# Patient Record
Sex: MALE | Race: WHITE | Employment: FULL TIME | URBAN - METROPOLITAN AREA
[De-identification: names, ages, dates, MRNs, and addresses within clinical notes are randomized per-mention and may not be internally consistent; named-entity substitution may affect disease eponyms.]

---

## 2017-01-19 PROBLEM — F41.9 ANXIETY: Status: ACTIVE | Noted: 2017-01-19

## 2017-01-19 PROBLEM — K92.2 UGIB (UPPER GASTROINTESTINAL BLEED): Status: ACTIVE | Noted: 2017-01-19

## 2017-01-19 PROBLEM — D62 ACUTE BLOOD LOSS ANEMIA: Status: ACTIVE | Noted: 2017-01-19

## 2017-08-24 ENCOUNTER — APPOINTMENT (OUTPATIENT)
Dept: GENERAL RADIOLOGY | Facility: HOSPITAL | Age: 52
End: 2017-08-24
Attending: EMERGENCY MEDICINE
Payer: COMMERCIAL

## 2017-08-24 ENCOUNTER — HOSPITAL ENCOUNTER (OUTPATIENT)
Facility: HOSPITAL | Age: 52
Setting detail: OBSERVATION
Discharge: HOME | End: 2017-08-25
Attending: EMERGENCY MEDICINE | Admitting: INTERNAL MEDICINE
Payer: COMMERCIAL

## 2017-08-24 DIAGNOSIS — F10.920 ACUTE ALCOHOLIC INTOXICATION WITHOUT COMPLICATION (HCC): ICD-10-CM

## 2017-08-24 DIAGNOSIS — S42.352A DISPLACED COMMINUTED FRACTURE OF SHAFT OF HUMERUS, LEFT ARM, INITIAL ENCOUNTER FOR CLOSED FRACTURE: Primary | ICD-10-CM

## 2017-08-24 LAB
ANION GAP SERPL CALC-SCNC: 8 MMOL/L (ref 0–18)
BASOPHILS # BLD: 0 K/UL (ref 0–0.2)
BASOPHILS NFR BLD: 1 %
BUN SERPL-MCNC: 17 MG/DL (ref 8–20)
BUN/CREAT SERPL: 15.2 (ref 10–20)
CALCIUM SERPL-MCNC: 8.8 MG/DL (ref 8.5–10.5)
CHLORIDE SERPL-SCNC: 112 MMOL/L (ref 95–110)
CO2 SERPL-SCNC: 23 MMOL/L (ref 22–32)
CREAT SERPL-MCNC: 1.12 MG/DL (ref 0.5–1.5)
EOSINOPHIL # BLD: 0.1 K/UL (ref 0–0.7)
EOSINOPHIL NFR BLD: 1 %
ERYTHROCYTE [DISTWIDTH] IN BLOOD BY AUTOMATED COUNT: 13.9 % (ref 11–15)
GLUCOSE SERPL-MCNC: 114 MG/DL (ref 70–99)
HCT VFR BLD AUTO: 43.8 % (ref 41–52)
HGB BLD-MCNC: 14.7 G/DL (ref 13.5–17.5)
LYMPHOCYTES # BLD: 0.7 K/UL (ref 1–4)
LYMPHOCYTES NFR BLD: 9 %
MCH RBC QN AUTO: 33.2 PG (ref 27–32)
MCHC RBC AUTO-ENTMCNC: 33.7 G/DL (ref 32–37)
MCV RBC AUTO: 98.6 FL (ref 80–100)
MONOCYTES # BLD: 0.6 K/UL (ref 0–1)
MONOCYTES NFR BLD: 8 %
NEUTROPHILS # BLD AUTO: 6.2 K/UL (ref 1.8–7.7)
NEUTROPHILS NFR BLD: 81 %
OSMOLALITY UR CALC.SUM OF ELEC: 298 MOSM/KG (ref 275–295)
PLATELET # BLD AUTO: 109 K/UL (ref 140–400)
PMV BLD AUTO: 11.3 FL (ref 7.4–10.3)
POTASSIUM SERPL-SCNC: 3.4 MMOL/L (ref 3.3–5.1)
RBC # BLD AUTO: 4.44 M/UL (ref 4.5–5.9)
SODIUM SERPL-SCNC: 143 MMOL/L (ref 136–144)
WBC # BLD AUTO: 7.7 K/UL (ref 4–11)

## 2017-08-24 PROCEDURE — 99285 EMERGENCY DEPT VISIT HI MDM: CPT

## 2017-08-24 PROCEDURE — 96374 THER/PROPH/DIAG INJ IV PUSH: CPT

## 2017-08-24 PROCEDURE — 73060 X-RAY EXAM OF HUMERUS: CPT | Performed by: EMERGENCY MEDICINE

## 2017-08-24 PROCEDURE — 80048 BASIC METABOLIC PNL TOTAL CA: CPT | Performed by: EMERGENCY MEDICINE

## 2017-08-24 PROCEDURE — 85025 COMPLETE CBC W/AUTO DIFF WBC: CPT | Performed by: EMERGENCY MEDICINE

## 2017-08-24 RX ORDER — HYDROMORPHONE HYDROCHLORIDE 1 MG/ML
0.5 INJECTION, SOLUTION INTRAMUSCULAR; INTRAVENOUS; SUBCUTANEOUS ONCE
Status: COMPLETED | OUTPATIENT
Start: 2017-08-24 | End: 2017-08-24

## 2017-08-25 ENCOUNTER — APPOINTMENT (OUTPATIENT)
Dept: CT IMAGING | Facility: HOSPITAL | Age: 52
End: 2017-08-25
Attending: ORTHOPAEDIC SURGERY
Payer: COMMERCIAL

## 2017-08-25 VITALS
SYSTOLIC BLOOD PRESSURE: 140 MMHG | DIASTOLIC BLOOD PRESSURE: 93 MMHG | RESPIRATION RATE: 20 BRPM | WEIGHT: 243 LBS | TEMPERATURE: 98 F | HEART RATE: 86 BPM | BODY MASS INDEX: 35.99 KG/M2 | OXYGEN SATURATION: 95 % | HEIGHT: 69 IN

## 2017-08-25 PROBLEM — S42.352A DISPLACED COMMINUTED FRACTURE OF SHAFT OF HUMERUS, LEFT ARM, INITIAL ENCOUNTER FOR CLOSED FRACTURE: Status: RESOLVED | Noted: 2017-08-24 | Resolved: 2017-08-25

## 2017-08-25 PROBLEM — F10.920 ACUTE ALCOHOLIC INTOXICATION WITHOUT COMPLICATION (HCC): Status: ACTIVE | Noted: 2017-08-25

## 2017-08-25 PROBLEM — F10.920 ACUTE ALCOHOLIC INTOXICATION WITHOUT COMPLICATION (HCC): Status: RESOLVED | Noted: 2017-08-25 | Resolved: 2017-08-25

## 2017-08-25 LAB
ALBUMIN SERPL BCP-MCNC: 3.5 G/DL (ref 3.5–4.8)
ALBUMIN/GLOB SERPL: 1.3 {RATIO} (ref 1–2)
ALP SERPL-CCNC: 75 U/L (ref 32–100)
ALT SERPL-CCNC: 31 U/L (ref 17–63)
ANION GAP SERPL CALC-SCNC: 7 MMOL/L (ref 0–18)
APTT PPP: 25.4 SECONDS (ref 23.2–35.3)
AST SERPL-CCNC: 30 U/L (ref 15–41)
BILIRUB SERPL-MCNC: 0.5 MG/DL (ref 0.3–1.2)
BUN SERPL-MCNC: 14 MG/DL (ref 8–20)
BUN/CREAT SERPL: 12 (ref 10–20)
CALCIUM SERPL-MCNC: 8.8 MG/DL (ref 8.5–10.5)
CHLORIDE SERPL-SCNC: 113 MMOL/L (ref 95–110)
CHLORIDE SERPL-SCNC: 113 MMOL/L (ref 95–110)
CO2 SERPL-SCNC: 22 MMOL/L (ref 22–32)
CO2 SERPL-SCNC: 22 MMOL/L (ref 22–32)
CREAT SERPL-MCNC: 1.17 MG/DL (ref 0.5–1.5)
GLOBULIN PLAS-MCNC: 2.8 G/DL (ref 2.5–3.7)
GLUCOSE SERPL-MCNC: 124 MG/DL (ref 70–99)
INR BLD: 1 (ref 0.9–1.2)
MAGNESIUM SERPL-MCNC: 1.5 MG/DL (ref 1.8–2.5)
OSMOLALITY UR CALC.SUM OF ELEC: 296 MOSM/KG (ref 275–295)
POTASSIUM SERPL-SCNC: 3.6 MMOL/L (ref 3.3–5.1)
POTASSIUM SERPL-SCNC: 3.6 MMOL/L (ref 3.3–5.1)
POTASSIUM SERPL-SCNC: 3.7 MMOL/L (ref 3.3–5.1)
PROT SERPL-MCNC: 6.3 G/DL (ref 5.9–8.4)
PROTHROMBIN TIME: 12.9 SECONDS (ref 11.8–14.5)
SODIUM SERPL-SCNC: 142 MMOL/L (ref 136–144)
SODIUM SERPL-SCNC: 142 MMOL/L (ref 136–144)

## 2017-08-25 PROCEDURE — 84132 ASSAY OF SERUM POTASSIUM: CPT | Performed by: INTERNAL MEDICINE

## 2017-08-25 PROCEDURE — 73200 CT UPPER EXTREMITY W/O DYE: CPT | Performed by: ORTHOPAEDIC SURGERY

## 2017-08-25 PROCEDURE — 80053 COMPREHEN METABOLIC PANEL: CPT | Performed by: INTERNAL MEDICINE

## 2017-08-25 PROCEDURE — 76376 3D RENDER W/INTRP POSTPROCES: CPT | Performed by: ORTHOPAEDIC SURGERY

## 2017-08-25 PROCEDURE — 85730 THROMBOPLASTIN TIME PARTIAL: CPT | Performed by: INTERNAL MEDICINE

## 2017-08-25 PROCEDURE — 80307 DRUG TEST PRSMV CHEM ANLYZR: CPT | Performed by: INTERNAL MEDICINE

## 2017-08-25 PROCEDURE — 87081 CULTURE SCREEN ONLY: CPT | Performed by: INTERNAL MEDICINE

## 2017-08-25 PROCEDURE — 83735 ASSAY OF MAGNESIUM: CPT | Performed by: INTERNAL MEDICINE

## 2017-08-25 PROCEDURE — 85610 PROTHROMBIN TIME: CPT | Performed by: INTERNAL MEDICINE

## 2017-08-25 PROCEDURE — 80051 ELECTROLYTE PANEL: CPT | Performed by: INTERNAL MEDICINE

## 2017-08-25 RX ORDER — HYDROCODONE BITARTRATE AND ACETAMINOPHEN 10; 325 MG/1; MG/1
1 TABLET ORAL EVERY 4 HOURS PRN
Status: DISCONTINUED | OUTPATIENT
Start: 2017-08-25 | End: 2017-08-25

## 2017-08-25 RX ORDER — HYDROMORPHONE HYDROCHLORIDE 1 MG/ML
0.5 INJECTION, SOLUTION INTRAMUSCULAR; INTRAVENOUS; SUBCUTANEOUS EVERY 6 HOURS PRN
Status: DISCONTINUED | OUTPATIENT
Start: 2017-08-25 | End: 2017-08-25

## 2017-08-25 RX ORDER — HYDROCODONE BITARTRATE AND ACETAMINOPHEN 10; 325 MG/1; MG/1
2 TABLET ORAL EVERY 6 HOURS PRN
Status: DISCONTINUED | OUTPATIENT
Start: 2017-08-25 | End: 2017-08-25

## 2017-08-25 RX ORDER — DEXTROSE AND SODIUM CHLORIDE 5; .45 G/100ML; G/100ML
INJECTION, SOLUTION INTRAVENOUS CONTINUOUS
Status: DISCONTINUED | OUTPATIENT
Start: 2017-08-25 | End: 2017-08-25

## 2017-08-25 RX ORDER — HYDROMORPHONE HYDROCHLORIDE 1 MG/ML
0.5 INJECTION, SOLUTION INTRAMUSCULAR; INTRAVENOUS; SUBCUTANEOUS EVERY 2 HOUR PRN
Status: DISCONTINUED | OUTPATIENT
Start: 2017-08-25 | End: 2017-08-25

## 2017-08-25 RX ORDER — HYDROCODONE BITARTRATE AND ACETAMINOPHEN 10; 325 MG/1; MG/1
1 TABLET ORAL EVERY 4 HOURS PRN
Qty: 25 TABLET | Refills: 0 | Status: SHIPPED | OUTPATIENT
Start: 2017-08-25

## 2017-08-25 RX ORDER — LORAZEPAM 1 MG/1
1 TABLET ORAL
Status: DISCONTINUED | OUTPATIENT
Start: 2017-08-25 | End: 2017-08-25

## 2017-08-25 RX ORDER — MAGNESIUM OXIDE 400 MG (241.3 MG MAGNESIUM) TABLET
800 TABLET ONCE
Status: COMPLETED | OUTPATIENT
Start: 2017-08-25 | End: 2017-08-25

## 2017-08-25 RX ORDER — PANTOPRAZOLE SODIUM 40 MG/1
40 TABLET, DELAYED RELEASE ORAL
Status: DISCONTINUED | OUTPATIENT
Start: 2017-08-25 | End: 2017-08-25

## 2017-08-25 RX ORDER — LORAZEPAM 2 MG/1
2 TABLET ORAL
Status: DISCONTINUED | OUTPATIENT
Start: 2017-08-25 | End: 2017-08-25

## 2017-08-25 RX ORDER — LORAZEPAM 2 MG/ML
1 INJECTION INTRAMUSCULAR
Status: DISCONTINUED | OUTPATIENT
Start: 2017-08-25 | End: 2017-08-25

## 2017-08-25 RX ORDER — LORAZEPAM 2 MG/ML
2 INJECTION INTRAMUSCULAR
Status: DISCONTINUED | OUTPATIENT
Start: 2017-08-25 | End: 2017-08-25

## 2017-08-25 NOTE — H&P
[facility]    History and Physical    Damien Eis Patient Status:  Observation    1965 MRN [id number]   Location [facility] 4W/SW/SE Attending Barry Quiñonez MD   Hosp Day # 0 PCP No primary care provider on file. 140/93, pulse 86, temperature 97.9 °F (36.6 °C), temperature source Oral, resp. rate 20, height 5' 9\" (1.753 m), weight 243 lb (110.2 kg), SpO2 95 %. Nursing note and vitals reviewed. Constitutional: He appears well-developed.    HENT:   Head: Normoce

## 2017-08-25 NOTE — PLAN OF CARE
GASTROINTESTINAL - ADULT    • Maintains or returns to baseline bowel function Progressing        HEMATOLOGIC - ADULT    • Free from bleeding injury Progressing        MUSCULOSKELETAL - ADULT    • Return mobility to safest level of function Progressing    •

## 2017-08-25 NOTE — CONSULTS
[facility]    Report of Consultation    Violeta Wiggins [facility] Patient Status:  Observation    1965 MRN [id number]   Location [facility] 4W/SW/SE Attending Yolanda Plummer MD   Hosp Day # 0 PCP No primary care provider on grecia dextrose 5 %-0.45 % NaCl infusion, , Intravenous, Continuous  •  HYDROmorphone HCl PF (DILAUDID) 1 MG/ML injection 0.5 mg, 0.5 mg, Intravenous, Q6H PRN  •  potassium chloride 40 mEq in sodium chloride 0.9 % 250 mL IVPB, 40 mEq, Intravenous, Once    Review

## 2017-08-25 NOTE — ED INITIAL ASSESSMENT (HPI)
Pt fell from a standing position while walking in front of his residence 2 hours ago. Denies LOC or head injury. Pt admits to 2 to 4 vodka drinks tonight. Deformity to upper L arm. Arrived with sling to L arm.

## 2017-08-25 NOTE — ED PROVIDER NOTES
Patient Seen in: [facility] AND [facility] Emergency Department    History   Patient presents with:  Fall (musculoskeletal, neurologic)    Stated Complaint: fall, l arm deformity    HPI    The patient is a 59-year-old male who tripped on a curb just prior to arr Smoking status: Current Every Day Smoker                                                   Packs/day: 1.00      Years: 28.00     Alcohol use:  No                Review of Systems    Positive for stated complaint: fall, l arm deformity  Other systems are as Abdominal: Soft. Normal appearance and bowel sounds are normal. He exhibits no distension. There is no tenderness. There is no rigidity, no rebound, no guarding and no CVA tenderness. No echymosis   Musculoskeletal: Normal range of motion.         Left up RAINBOW DRAW LAVENDER TALL (BNP)       ============================================================  ED Course  ------------------------------------------------------------   A long-arm coaptation splint was applied to the left upper extremity.   After appl

## 2017-09-05 ENCOUNTER — APPOINTMENT (OUTPATIENT)
Dept: GENERAL RADIOLOGY | Facility: HOSPITAL | Age: 52
End: 2017-09-05
Attending: EMERGENCY MEDICINE
Payer: COMMERCIAL

## 2017-09-05 ENCOUNTER — HOSPITAL ENCOUNTER (EMERGENCY)
Facility: HOSPITAL | Age: 52
Discharge: NURSING, REHAB OR LONG-TERM CARE | End: 2017-09-05
Attending: EMERGENCY MEDICINE
Payer: COMMERCIAL

## 2017-09-05 VITALS
SYSTOLIC BLOOD PRESSURE: 122 MMHG | RESPIRATION RATE: 20 BRPM | WEIGHT: 240 LBS | DIASTOLIC BLOOD PRESSURE: 74 MMHG | TEMPERATURE: 98 F | HEART RATE: 108 BPM | BODY MASS INDEX: 35.55 KG/M2 | HEIGHT: 69 IN | OXYGEN SATURATION: 94 %
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
## 2017-09-05 DIAGNOSIS — F10.20 ALCOHOLISM (HCC): ICD-10-CM

## 2017-09-05 LAB
ALBUMIN SERPL BCP-MCNC: 3.7 G/DL (ref 3.5–4.8)
ALBUMIN/GLOB SERPL: 1 {RATIO} (ref 1–2)
ALP SERPL-CCNC: 100 U/L (ref 32–100)
ALT SERPL-CCNC: 56 U/L (ref 17–63)
ANION GAP SERPL CALC-SCNC: 17 MMOL/L (ref 0–18)
AST SERPL-CCNC: 59 U/L (ref 15–41)
BASOPHILS # BLD: 0 K/UL (ref 0–0.2)
BASOPHILS NFR BLD: 1 %
BENZODIAZ UR QL SCN: DETECTED
BILIRUB SERPL-MCNC: 0.7 MG/DL (ref 0.3–1.2)
BILIRUB UR QL: NEGATIVE
BUN SERPL-MCNC: 16 MG/DL (ref 8–20)
BUN/CREAT SERPL: 14.2 (ref 10–20)
CALCIUM SERPL-MCNC: 9.2 MG/DL (ref 8.5–10.5)
CHLORIDE SERPL-SCNC: 101 MMOL/L (ref 95–110)
CLARITY UR: CLEAR
CO2 SERPL-SCNC: 21 MMOL/L (ref 22–32)
COLOR UR: YELLOW
CREAT SERPL-MCNC: 1.13 MG/DL (ref 0.5–1.5)
EOSINOPHIL # BLD: 0.1 K/UL (ref 0–0.7)
EOSINOPHIL NFR BLD: 1 %
ERYTHROCYTE [DISTWIDTH] IN BLOOD BY AUTOMATED COUNT: 14.1 % (ref 11–15)
ETHANOL SERPL-MCNC: 237 MG/DL
GLOBULIN PLAS-MCNC: 3.7 G/DL (ref 2.5–3.7)
GLUCOSE SERPL-MCNC: 88 MG/DL (ref 70–99)
GLUCOSE UR-MCNC: NEGATIVE MG/DL
HCT VFR BLD AUTO: 46.9 % (ref 41–52)
HGB BLD-MCNC: 16 G/DL (ref 13.5–17.5)
HYALINE CASTS #/AREA URNS AUTO: 3 /LPF
KETONES UR-MCNC: NEGATIVE MG/DL
LEUKOCYTE ESTERASE UR QL STRIP.AUTO: NEGATIVE
LYMPHOCYTES # BLD: 1.4 K/UL (ref 1–4)
LYMPHOCYTES NFR BLD: 23 %
MCH RBC QN AUTO: 32.9 PG (ref 27–32)
MCHC RBC AUTO-ENTMCNC: 34.1 G/DL (ref 32–37)
MCV RBC AUTO: 96.5 FL (ref 80–100)
MONOCYTES # BLD: 0.5 K/UL (ref 0–1)
MONOCYTES NFR BLD: 9 %
NEUTROPHILS # BLD AUTO: 4.1 K/UL (ref 1.8–7.7)
NEUTROPHILS NFR BLD: 67 %
NITRITE UR QL STRIP.AUTO: NEGATIVE
OSMOLALITY UR CALC.SUM OF ELEC: 289 MOSM/KG (ref 275–295)
PH UR: 5 [PH] (ref 5–8)
PLATELET # BLD AUTO: 164 K/UL (ref 140–400)
PMV BLD AUTO: 9.2 FL (ref 7.4–10.3)
POTASSIUM SERPL-SCNC: 3.9 MMOL/L (ref 3.3–5.1)
PROT SERPL-MCNC: 7.4 G/DL (ref 5.9–8.4)
PROT UR-MCNC: 100 MG/DL
RBC # BLD AUTO: 4.86 M/UL (ref 4.5–5.9)
RBC #/AREA URNS AUTO: <1 /HPF
SODIUM SERPL-SCNC: 139 MMOL/L (ref 136–144)
SP GR UR STRIP: 1.01 (ref 1–1.03)
UROBILINOGEN UR STRIP-ACNC: 2
VIT C UR-MCNC: NEGATIVE MG/DL
WBC # BLD AUTO: 6.2 K/UL (ref 4–11)
WBC #/AREA URNS AUTO: 1 /HPF

## 2017-09-05 PROCEDURE — 80320 DRUG SCREEN QUANTALCOHOLS: CPT | Performed by: EMERGENCY MEDICINE

## 2017-09-05 PROCEDURE — 99285 EMERGENCY DEPT VISIT HI MDM: CPT

## 2017-09-05 PROCEDURE — 80053 COMPREHEN METABOLIC PANEL: CPT | Performed by: EMERGENCY MEDICINE

## 2017-09-05 PROCEDURE — 81001 URINALYSIS AUTO W/SCOPE: CPT | Performed by: EMERGENCY MEDICINE

## 2017-09-05 PROCEDURE — 85025 COMPLETE CBC W/AUTO DIFF WBC: CPT | Performed by: EMERGENCY MEDICINE

## 2017-09-05 PROCEDURE — 29105 APPLICATION LONG ARM SPLINT: CPT

## 2017-09-05 PROCEDURE — 80307 DRUG TEST PRSMV CHEM ANLYZR: CPT | Performed by: EMERGENCY MEDICINE

## 2017-09-05 PROCEDURE — 73060 X-RAY EXAM OF HUMERUS: CPT | Performed by: EMERGENCY MEDICINE

## 2017-09-05 PROCEDURE — 36415 COLL VENOUS BLD VENIPUNCTURE: CPT

## 2017-09-05 RX ORDER — ACETAMINOPHEN 500 MG
1000 TABLET ORAL ONCE
Status: COMPLETED | OUTPATIENT
Start: 2017-09-05 | End: 2017-09-05

## 2017-09-05 RX ORDER — LORAZEPAM 1 MG/1
1 TABLET ORAL ONCE
Status: COMPLETED | OUTPATIENT
Start: 2017-09-05 | End: 2017-09-05

## 2017-09-05 RX ORDER — ONDANSETRON 4 MG/1
4 TABLET, ORALLY DISINTEGRATING ORAL ONCE
Status: COMPLETED | OUTPATIENT
Start: 2017-09-05 | End: 2017-09-05

## 2017-09-05 NOTE — BH LEVEL OF CARE ASSESSMENT
Level of Care Assessment Note     General Questions  Why are you here?: \"I'm an alcoholic\"  Precipitating Events: Pt reports relapse re: alcohol beginning 2017 after his mother  from a heart attack. Pt reports 10 years sober prior.   History o Others/Property  Current/Recent Harm Toward Others: No  Past Harm Toward Others: No  Current or Past Harm Toward Animals: No  History or Allegations of Inappropriate Physical Contact: No  Current/Recent Destructive Behavior Toward Property: No  Past Destru of Treatment: 2007  Date Last Seen: 2007  Reason: Alcohol abuse  Effectiveness: Helpful, pt sober 10 years     Current/Previous MH/CD Treatment  Recovery Support Groups: 12 step groups (comment) (Attended AA in the past, not currently)  History of [place] Status  Appearance Characteristics: Disheveled  Mood: Depressed; Sad  Affect: Congruent  Eye Contact: Fair  Level of Consciousness: Alert  Exhibited Behavior : Appropriate to situation; Cooperative  Gait/Movement: Steady  Speech Characteristics: Normal rate; ago and broke his left humerus, received tx at that time        Patient Intent  1. Previous suicide attempt: No  2. Imminent suicide risk: No  3. Suicide plan and means: No  Patient Mental Status  4. Severe Psychological distress or anguish: Yes  5.  Self-l

## 2017-09-05 NOTE — BH PROGRESS NOTE
UPDATE: when ED RN Hayley Ding re-assessed pt at 3:00PM, pt reported to her Adalberto Cortez don't you let me go, I'll have a nice meal, and then walk out in front of traffic\".  This writer met with pt to re-assess at 3:20PM, pt confirms statement made to ED RN, reports \

## 2017-09-05 NOTE — ED PROVIDER NOTES
Patient Seen in: [facility] AND [facility] Emergency Department    History   Patient presents with:  Fall (musculoskeletal, neurologic)    Stated Complaint: fall    HPI    Pt is 48 yo M who p/w fall that occurred immediately pta.  Pt states he fell down a few sta intact  Resp: CTAB, no wheezes or retractions  Ab: soft, nontender, no distension  Extremities: good hand grasp with normal pulses. Minimal movement of LUE. Other extremities normal. TTP over LUE. Compartments soft.  +ecchymosis and swelling of humerus  Bonnie splint was adequately immobilizing the joint and distal to the splint the patient's circulation and sensation was intact. Cardiac monitor: HR 100s, NSR  Pt did receive PO ativan x 2. HR improved.  Pt with no tremors and denies symptoms of withdrawal. D/w

## 2017-09-05 NOTE — BH PROGRESS NOTE
Pt is accepted for transfer to [facility].     Accepting MD is Dr. Claribel Miranda    RN to RN report can be called into [phone]

## 2017-09-05 NOTE — BH LEVEL OF CARE ASSESSMENT
Level of Care Assessment Note     General Questions  Why are you here?: \"I'm an alcoholic\"  Precipitating Events: Pt reports relapse re: alcohol beginning 2017 after his mother  from a heart attack. Pt reports 10 years sober prior.   History o Past Suicidal Ideation: Similar thoughts as reported above. Pt denies any hx of suicide plan, intent, or attempts.   Past Suicide Plan: No  Past Suicide Intent: No  Past Suicide Rehearsal: No  Past Suicide Attempt: No  Past Suicide Risk Mitigating Factors: Disturbed/interrupted sleep  Use of Sleep Aids: None reported  Appetite Symptoms: No appetite (Pt has not eaten in 3 days)  Unplanned Weight Loss: No  Unplanned Weight Gain: No  History of Eating Disorder: No  Active Eating Disorder: No                   currently living out of hotel room)     Abuse Assessment  Physical Abuse: Denies  Verbal Abuse: Denies  Sexual Abuse: Denies  Neglect: Denies  Does anyone say or do something to you that makes you feel unsafe?: No  Have You Ever Been Harmed by a Partner/Ca 3:00PM, pt reported to her \"why don't you let me go, I'll have a nice meal, and then walk out in front of traffic\".     Level of Care Recommendations  Consulted with: Dr. Deep Saldivar  Level of Care Recommendation: Inpatient Acute Care  Unit: CDU  Reason for Un

## 2017-09-05 NOTE — BH PROGRESS NOTE
Advised pt of the following:    Detox unit at [facility] is a locked unit and patient would be unable to sign himself out. There is no smoking allowed at [facility].     His admission to an inpatient unit will result in the revocation of obtaining a FOID card for 5 ye

## 2017-09-05 NOTE — ED INITIAL ASSESSMENT (HPI)
Pt fell down 2 steps this morning, bumped left shoulder and twisted his left leg. States was drinking alcohol last night to this morning. \"I am an alcoholic\"  Requesting help for detox.  Had a fall 3 weeks ago and broke his left humerus, has the same [facility]

## 2017-09-05 NOTE — BH PROGRESS NOTE
Completed pre-cert for IP detox. Called [facility] at [phone] and spoke with Katheran Schirmer. Katheran Schirmer reports 4 IP days authorized 9/5/17-9/8/17. A care manager will contact [facility] [address] tomorrow to schedule concurrent review.     Auth # [id number]

## 2017-09-06 PROBLEM — F10.20 ALCOHOL USE DISORDER, SEVERE, DEPENDENCE (HCC): Status: ACTIVE | Noted: 2017-09-05

## 2022-03-24 ENCOUNTER — APPOINTMENT (OUTPATIENT)
Dept: GENERAL RADIOLOGY | Age: 57
DRG: 342 | End: 2022-03-24
Payer: COMMERCIAL

## 2022-03-24 ENCOUNTER — APPOINTMENT (OUTPATIENT)
Dept: CT IMAGING | Age: 57
DRG: 342 | End: 2022-03-24
Payer: COMMERCIAL

## 2022-03-24 ENCOUNTER — HOSPITAL ENCOUNTER (INPATIENT)
Age: 57
LOS: 5 days | Discharge: HOME HEALTH | DRG: 342 | End: 2022-03-29
Attending: EMERGENCY MEDICINE | Admitting: FAMILY MEDICINE
Payer: COMMERCIAL

## 2022-03-24 DIAGNOSIS — Z95.2 AORTIC VALVE REPLACED: ICD-10-CM

## 2022-03-24 DIAGNOSIS — R07.9 CHEST PAIN, UNSPECIFIED TYPE: Primary | ICD-10-CM

## 2022-03-24 DIAGNOSIS — S82.122A CLOSED FRACTURE OF LATERAL PORTION OF LEFT TIBIAL PLATEAU, INITIAL ENCOUNTER: ICD-10-CM

## 2022-03-24 LAB
ACETAMINOPHEN LEVEL: <5 MCG/ML (ref 10–30)
ALBUMIN SERPL-MCNC: 2.6 G/DL (ref 3.5–5.2)
ALBUMIN SERPL-MCNC: 3.7 G/DL (ref 3.5–5.2)
ALP BLD-CCNC: 148 U/L (ref 40–129)
ALP BLD-CCNC: 98 U/L (ref 40–129)
ALT SERPL-CCNC: 18 U/L (ref 0–40)
ALT SERPL-CCNC: 27 U/L (ref 0–40)
ANION GAP SERPL CALCULATED.3IONS-SCNC: 10 MMOL/L (ref 7–16)
ANION GAP SERPL CALCULATED.3IONS-SCNC: 13 MMOL/L (ref 7–16)
ANION GAP SERPL CALCULATED.3IONS-SCNC: 9 MMOL/L (ref 7–16)
APTT: 155.1 SEC (ref 24.5–35.1)
APTT: 32 SEC (ref 24.5–35.1)
APTT: 33.5 SEC (ref 24.5–35.1)
AST SERPL-CCNC: 24 U/L (ref 0–39)
AST SERPL-CCNC: 33 U/L (ref 0–39)
BACTERIA: ABNORMAL /HPF
BASOPHILS ABSOLUTE: 0.09 E9/L (ref 0–0.2)
BASOPHILS RELATIVE PERCENT: 1.5 % (ref 0–2)
BILIRUB SERPL-MCNC: 0.3 MG/DL (ref 0–1.2)
BILIRUB SERPL-MCNC: <0.2 MG/DL (ref 0–1.2)
BILIRUBIN URINE: NEGATIVE
BLOOD, URINE: ABNORMAL
BUN BLDV-MCNC: 16 MG/DL (ref 6–20)
BUN BLDV-MCNC: 19 MG/DL (ref 6–20)
BUN BLDV-MCNC: 20 MG/DL (ref 6–20)
CALCIUM SERPL-MCNC: 6.1 MG/DL (ref 8.6–10.2)
CALCIUM SERPL-MCNC: 8.3 MG/DL (ref 8.6–10.2)
CALCIUM SERPL-MCNC: 8.5 MG/DL (ref 8.6–10.2)
CHLORIDE BLD-SCNC: 106 MMOL/L (ref 98–107)
CHLORIDE BLD-SCNC: 107 MMOL/L (ref 98–107)
CHLORIDE BLD-SCNC: 116 MMOL/L (ref 98–107)
CHOLESTEROL, TOTAL: 86 MG/DL (ref 0–199)
CLARITY: CLEAR
CO2: 19 MMOL/L (ref 22–29)
CO2: 21 MMOL/L (ref 22–29)
CO2: 22 MMOL/L (ref 22–29)
COARSE CASTS, UA: ABNORMAL /LPF (ref 0–2)
COLOR: YELLOW
CREAT SERPL-MCNC: 1.3 MG/DL (ref 0.7–1.2)
CREAT SERPL-MCNC: 1.5 MG/DL (ref 0.7–1.2)
CREAT SERPL-MCNC: 1.7 MG/DL (ref 0.7–1.2)
EOSINOPHILS ABSOLUTE: 0.31 E9/L (ref 0.05–0.5)
EOSINOPHILS RELATIVE PERCENT: 5 % (ref 0–6)
ETHANOL: 163 MG/DL (ref 0–0.08)
GFR AFRICAN AMERICAN: 51
GFR AFRICAN AMERICAN: 59
GFR AFRICAN AMERICAN: >60
GFR NON-AFRICAN AMERICAN: 42 ML/MIN/1.73
GFR NON-AFRICAN AMERICAN: 48 ML/MIN/1.73
GFR NON-AFRICAN AMERICAN: 57 ML/MIN/1.73
GLUCOSE BLD-MCNC: 113 MG/DL (ref 74–99)
GLUCOSE BLD-MCNC: 78 MG/DL (ref 74–99)
GLUCOSE BLD-MCNC: 92 MG/DL (ref 74–99)
GLUCOSE URINE: NEGATIVE MG/DL
HBA1C MFR BLD: 5.2 % (ref 4–5.6)
HCT VFR BLD CALC: 47 % (ref 37–54)
HDLC SERPL-MCNC: 28 MG/DL
HEMOGLOBIN: 15.3 G/DL (ref 12.5–16.5)
IMMATURE GRANULOCYTES #: 0.03 E9/L
IMMATURE GRANULOCYTES %: 0.5 % (ref 0–5)
INR BLD: 1.7
KETONES, URINE: NEGATIVE MG/DL
LDL CHOLESTEROL CALCULATED: 28 MG/DL (ref 0–99)
LEUKOCYTE ESTERASE, URINE: NEGATIVE
LYMPHOCYTES ABSOLUTE: 1.18 E9/L (ref 1.5–4)
LYMPHOCYTES RELATIVE PERCENT: 19.2 % (ref 20–42)
MAGNESIUM: 1.3 MG/DL (ref 1.6–2.6)
MAGNESIUM: 1.9 MG/DL (ref 1.6–2.6)
MCH RBC QN AUTO: 31.5 PG (ref 26–35)
MCHC RBC AUTO-ENTMCNC: 32.6 % (ref 32–34.5)
MCV RBC AUTO: 96.7 FL (ref 80–99.9)
MONOCYTES ABSOLUTE: 0.84 E9/L (ref 0.1–0.95)
MONOCYTES RELATIVE PERCENT: 13.7 % (ref 2–12)
NEUTROPHILS ABSOLUTE: 3.69 E9/L (ref 1.8–7.3)
NEUTROPHILS RELATIVE PERCENT: 60.1 % (ref 43–80)
NITRITE, URINE: NEGATIVE
PDW BLD-RTO: 16.8 FL (ref 11.5–15)
PH UA: 6.5 (ref 5–9)
PLATELET # BLD: 141 E9/L (ref 130–450)
PMV BLD AUTO: 11.7 FL (ref 7–12)
POTASSIUM REFLEX MAGNESIUM: 2.9 MMOL/L (ref 3.5–5)
POTASSIUM REFLEX MAGNESIUM: 3.9 MMOL/L (ref 3.5–5)
POTASSIUM SERPL-SCNC: 3.6 MMOL/L (ref 3.5–5)
PRO-BNP: 234 PG/ML (ref 0–125)
PROTEIN UA: 100 MG/DL
PROTHROMBIN TIME: 18.9 SEC (ref 9.3–12.4)
RBC # BLD: 4.86 E12/L (ref 3.8–5.8)
RBC UA: ABNORMAL /HPF (ref 0–2)
REASON FOR REJECTION: NORMAL
REASON FOR REJECTION: NORMAL
REJECTED TEST: NORMAL
REJECTED TEST: NORMAL
SALICYLATE, SERUM: <0.3 MG/DL (ref 0–30)
SODIUM BLD-SCNC: 139 MMOL/L (ref 132–146)
SODIUM BLD-SCNC: 140 MMOL/L (ref 132–146)
SODIUM BLD-SCNC: 144 MMOL/L (ref 132–146)
SPECIFIC GRAVITY UA: 1.01 (ref 1–1.03)
T4 FREE: 1.01 NG/DL (ref 0.93–1.7)
TOTAL PROTEIN: 4.7 G/DL (ref 6.4–8.3)
TOTAL PROTEIN: 7.1 G/DL (ref 6.4–8.3)
TRICYCLIC ANTIDEPRESSANTS SCREEN SERUM: NEGATIVE NG/ML
TRIGL SERPL-MCNC: 149 MG/DL (ref 0–149)
TROPONIN, HIGH SENSITIVITY: 40 NG/L (ref 0–11)
TROPONIN, HIGH SENSITIVITY: 54 NG/L (ref 0–11)
TROPONIN, HIGH SENSITIVITY: 61 NG/L (ref 0–11)
TSH SERPL DL<=0.05 MIU/L-ACNC: 1.56 UIU/ML (ref 0.27–4.2)
UROBILINOGEN, URINE: 0.2 E.U./DL
VLDLC SERPL CALC-MCNC: 30 MG/DL
WBC # BLD: 6.1 E9/L (ref 4.5–11.5)
WBC UA: ABNORMAL /HPF (ref 0–5)

## 2022-03-24 PROCEDURE — 84484 ASSAY OF TROPONIN QUANT: CPT

## 2022-03-24 PROCEDURE — 80061 LIPID PANEL: CPT

## 2022-03-24 PROCEDURE — 80307 DRUG TEST PRSMV CHEM ANLYZR: CPT

## 2022-03-24 PROCEDURE — 6370000000 HC RX 637 (ALT 250 FOR IP): Performed by: NURSE PRACTITIONER

## 2022-03-24 PROCEDURE — 80179 DRUG ASSAY SALICYLATE: CPT

## 2022-03-24 PROCEDURE — 2580000003 HC RX 258: Performed by: FAMILY MEDICINE

## 2022-03-24 PROCEDURE — 96374 THER/PROPH/DIAG INJ IV PUSH: CPT

## 2022-03-24 PROCEDURE — 70450 CT HEAD/BRAIN W/O DYE: CPT

## 2022-03-24 PROCEDURE — 2140000000 HC CCU INTERMEDIATE R&B

## 2022-03-24 PROCEDURE — 72125 CT NECK SPINE W/O DYE: CPT

## 2022-03-24 PROCEDURE — 73564 X-RAY EXAM KNEE 4 OR MORE: CPT

## 2022-03-24 PROCEDURE — 2580000003 HC RX 258: Performed by: NURSE PRACTITIONER

## 2022-03-24 PROCEDURE — 73700 CT LOWER EXTREMITY W/O DYE: CPT

## 2022-03-24 PROCEDURE — 6360000002 HC RX W HCPCS: Performed by: NURSE PRACTITIONER

## 2022-03-24 PROCEDURE — 85610 PROTHROMBIN TIME: CPT

## 2022-03-24 PROCEDURE — 80053 COMPREHEN METABOLIC PANEL: CPT

## 2022-03-24 PROCEDURE — 83880 ASSAY OF NATRIURETIC PEPTIDE: CPT

## 2022-03-24 PROCEDURE — 73590 X-RAY EXAM OF LOWER LEG: CPT

## 2022-03-24 PROCEDURE — 93005 ELECTROCARDIOGRAM TRACING: CPT | Performed by: NURSE PRACTITIONER

## 2022-03-24 PROCEDURE — 85025 COMPLETE CBC W/AUTO DIFF WBC: CPT

## 2022-03-24 PROCEDURE — 85730 THROMBOPLASTIN TIME PARTIAL: CPT

## 2022-03-24 PROCEDURE — 6360000002 HC RX W HCPCS: Performed by: INTERNAL MEDICINE

## 2022-03-24 PROCEDURE — 83036 HEMOGLOBIN GLYCOSYLATED A1C: CPT

## 2022-03-24 PROCEDURE — 6360000002 HC RX W HCPCS: Performed by: FAMILY MEDICINE

## 2022-03-24 PROCEDURE — 2580000003 HC RX 258: Performed by: INTERNAL MEDICINE

## 2022-03-24 PROCEDURE — 83735 ASSAY OF MAGNESIUM: CPT

## 2022-03-24 PROCEDURE — 36415 COLL VENOUS BLD VENIPUNCTURE: CPT

## 2022-03-24 PROCEDURE — 99285 EMERGENCY DEPT VISIT HI MDM: CPT

## 2022-03-24 PROCEDURE — 93010 ELECTROCARDIOGRAM REPORT: CPT | Performed by: INTERNAL MEDICINE

## 2022-03-24 PROCEDURE — 6370000000 HC RX 637 (ALT 250 FOR IP): Performed by: INTERNAL MEDICINE

## 2022-03-24 PROCEDURE — 80143 DRUG ASSAY ACETAMINOPHEN: CPT

## 2022-03-24 PROCEDURE — 81001 URINALYSIS AUTO W/SCOPE: CPT

## 2022-03-24 PROCEDURE — 99254 IP/OBS CNSLTJ NEW/EST MOD 60: CPT | Performed by: INTERNAL MEDICINE

## 2022-03-24 PROCEDURE — 82077 ASSAY SPEC XCP UR&BREATH IA: CPT

## 2022-03-24 PROCEDURE — 71045 X-RAY EXAM CHEST 1 VIEW: CPT

## 2022-03-24 PROCEDURE — 6370000000 HC RX 637 (ALT 250 FOR IP): Performed by: FAMILY MEDICINE

## 2022-03-24 PROCEDURE — APPSS60 APP SPLIT SHARED TIME 46-60 MINUTES: Performed by: NURSE PRACTITIONER

## 2022-03-24 PROCEDURE — 6360000002 HC RX W HCPCS: Performed by: PHYSICAL THERAPY ASSISTANT

## 2022-03-24 PROCEDURE — 80048 BASIC METABOLIC PNL TOTAL CA: CPT

## 2022-03-24 PROCEDURE — 6360000002 HC RX W HCPCS: Performed by: EMERGENCY MEDICINE

## 2022-03-24 PROCEDURE — 84439 ASSAY OF FREE THYROXINE: CPT

## 2022-03-24 PROCEDURE — 84443 ASSAY THYROID STIM HORMONE: CPT

## 2022-03-24 RX ORDER — ASPIRIN 81 MG/1
81 TABLET ORAL DAILY
Status: DISCONTINUED | OUTPATIENT
Start: 2022-03-24 | End: 2022-03-30 | Stop reason: HOSPADM

## 2022-03-24 RX ORDER — HEPARIN SODIUM 1000 [USP'U]/ML
60 INJECTION, SOLUTION INTRAVENOUS; SUBCUTANEOUS ONCE
Status: COMPLETED | OUTPATIENT
Start: 2022-03-24 | End: 2022-03-24

## 2022-03-24 RX ORDER — HEPARIN SODIUM 1000 [USP'U]/ML
60 INJECTION, SOLUTION INTRAVENOUS; SUBCUTANEOUS PRN
Status: DISCONTINUED | OUTPATIENT
Start: 2022-03-24 | End: 2022-03-26 | Stop reason: ALTCHOICE

## 2022-03-24 RX ORDER — SODIUM CHLORIDE 9 MG/ML
INJECTION, SOLUTION INTRAVENOUS CONTINUOUS
Status: DISCONTINUED | OUTPATIENT
Start: 2022-03-24 | End: 2022-03-24

## 2022-03-24 RX ORDER — ARIPIPRAZOLE 30 MG/1
30 TABLET ORAL DAILY
COMMUNITY
End: 2022-08-22

## 2022-03-24 RX ORDER — METOPROLOL SUCCINATE 25 MG/1
25 TABLET, EXTENDED RELEASE ORAL DAILY
COMMUNITY
End: 2022-05-28 | Stop reason: SDUPTHER

## 2022-03-24 RX ORDER — POTASSIUM CHLORIDE 7.45 MG/ML
10 INJECTION INTRAVENOUS PRN
Status: DISCONTINUED | OUTPATIENT
Start: 2022-03-24 | End: 2022-03-30 | Stop reason: HOSPADM

## 2022-03-24 RX ORDER — FENTANYL CITRATE 50 UG/ML
50 INJECTION, SOLUTION INTRAMUSCULAR; INTRAVENOUS
Status: DISCONTINUED | OUTPATIENT
Start: 2022-03-24 | End: 2022-03-24

## 2022-03-24 RX ORDER — FENTANYL CITRATE 50 UG/ML
50 INJECTION, SOLUTION INTRAMUSCULAR; INTRAVENOUS ONCE
Status: COMPLETED | OUTPATIENT
Start: 2022-03-24 | End: 2022-03-24

## 2022-03-24 RX ORDER — ONDANSETRON 2 MG/ML
4 INJECTION INTRAMUSCULAR; INTRAVENOUS EVERY 6 HOURS PRN
Status: DISCONTINUED | OUTPATIENT
Start: 2022-03-24 | End: 2022-03-30 | Stop reason: HOSPADM

## 2022-03-24 RX ORDER — WARFARIN SODIUM 7.5 MG/1
7.5 TABLET ORAL
Status: COMPLETED | OUTPATIENT
Start: 2022-03-24 | End: 2022-03-24

## 2022-03-24 RX ORDER — HYDRALAZINE HYDROCHLORIDE 20 MG/ML
5 INJECTION INTRAMUSCULAR; INTRAVENOUS EVERY 6 HOURS PRN
Status: DISCONTINUED | OUTPATIENT
Start: 2022-03-24 | End: 2022-03-30 | Stop reason: HOSPADM

## 2022-03-24 RX ORDER — SODIUM CHLORIDE 0.9 % (FLUSH) 0.9 %
10 SYRINGE (ML) INJECTION PRN
Status: DISCONTINUED | OUTPATIENT
Start: 2022-03-24 | End: 2022-03-30 | Stop reason: HOSPADM

## 2022-03-24 RX ORDER — SODIUM CHLORIDE 9 MG/ML
25 INJECTION, SOLUTION INTRAVENOUS PRN
Status: DISCONTINUED | OUTPATIENT
Start: 2022-03-24 | End: 2022-03-26 | Stop reason: ALTCHOICE

## 2022-03-24 RX ORDER — SODIUM CHLORIDE 0.9 % (FLUSH) 0.9 %
10 SYRINGE (ML) INJECTION EVERY 12 HOURS SCHEDULED
Status: DISCONTINUED | OUTPATIENT
Start: 2022-03-24 | End: 2022-03-30 | Stop reason: HOSPADM

## 2022-03-24 RX ORDER — FUROSEMIDE 20 MG/1
20 TABLET ORAL DAILY
COMMUNITY
End: 2022-05-28 | Stop reason: SDUPTHER

## 2022-03-24 RX ORDER — OXYCODONE HYDROCHLORIDE AND ACETAMINOPHEN 5; 325 MG/1; MG/1
1 TABLET ORAL EVERY 6 HOURS PRN
Status: DISCONTINUED | OUTPATIENT
Start: 2022-03-24 | End: 2022-03-24

## 2022-03-24 RX ORDER — HEPARIN SODIUM 10000 [USP'U]/100ML
5-30 INJECTION, SOLUTION INTRAVENOUS CONTINUOUS
Status: DISCONTINUED | OUTPATIENT
Start: 2022-03-24 | End: 2022-03-26 | Stop reason: ALTCHOICE

## 2022-03-24 RX ORDER — OXYCODONE HYDROCHLORIDE AND ACETAMINOPHEN 5; 325 MG/1; MG/1
2 TABLET ORAL EVERY 4 HOURS PRN
Status: DISCONTINUED | OUTPATIENT
Start: 2022-03-24 | End: 2022-03-30 | Stop reason: HOSPADM

## 2022-03-24 RX ORDER — POLYETHYLENE GLYCOL 3350 17 G/17G
17 POWDER, FOR SOLUTION ORAL DAILY PRN
Status: DISCONTINUED | OUTPATIENT
Start: 2022-03-24 | End: 2022-03-30 | Stop reason: HOSPADM

## 2022-03-24 RX ORDER — HEPARIN SODIUM 1000 [USP'U]/ML
30 INJECTION, SOLUTION INTRAVENOUS; SUBCUTANEOUS PRN
Status: DISCONTINUED | OUTPATIENT
Start: 2022-03-24 | End: 2022-03-26 | Stop reason: ALTCHOICE

## 2022-03-24 RX ORDER — ACETAMINOPHEN 325 MG/1
650 TABLET ORAL EVERY 6 HOURS PRN
Status: DISCONTINUED | OUTPATIENT
Start: 2022-03-24 | End: 2022-03-30 | Stop reason: HOSPADM

## 2022-03-24 RX ORDER — ROSUVASTATIN CALCIUM 40 MG/1
40 TABLET, COATED ORAL DAILY
COMMUNITY
End: 2022-08-22

## 2022-03-24 RX ORDER — ATORVASTATIN CALCIUM 40 MG/1
80 TABLET, FILM COATED ORAL NIGHTLY
Status: DISCONTINUED | OUTPATIENT
Start: 2022-03-24 | End: 2022-03-30 | Stop reason: HOSPADM

## 2022-03-24 RX ORDER — MORPHINE SULFATE 2 MG/ML
2 INJECTION, SOLUTION INTRAMUSCULAR; INTRAVENOUS ONCE
Status: COMPLETED | OUTPATIENT
Start: 2022-03-24 | End: 2022-03-24

## 2022-03-24 RX ORDER — WARFARIN SODIUM 2.5 MG/1
2.5 TABLET ORAL DAILY
Status: ON HOLD | COMMUNITY
End: 2022-03-27 | Stop reason: SDUPTHER

## 2022-03-24 RX ORDER — PROMETHAZINE HYDROCHLORIDE 25 MG/1
12.5 TABLET ORAL EVERY 6 HOURS PRN
Status: DISCONTINUED | OUTPATIENT
Start: 2022-03-24 | End: 2022-03-30 | Stop reason: HOSPADM

## 2022-03-24 RX ORDER — WARFARIN SODIUM 5 MG/1
5 TABLET ORAL DAILY
Status: ON HOLD | COMMUNITY
End: 2022-03-27 | Stop reason: SDUPTHER

## 2022-03-24 RX ORDER — OXYCODONE HYDROCHLORIDE AND ACETAMINOPHEN 5; 325 MG/1; MG/1
1 TABLET ORAL EVERY 4 HOURS PRN
Status: DISCONTINUED | OUTPATIENT
Start: 2022-03-24 | End: 2022-03-30 | Stop reason: HOSPADM

## 2022-03-24 RX ORDER — WARFARIN SODIUM 5 MG/1
5 TABLET ORAL DAILY
Status: DISCONTINUED | OUTPATIENT
Start: 2022-03-24 | End: 2022-03-24 | Stop reason: DRUGHIGH

## 2022-03-24 RX ORDER — ACETAMINOPHEN 650 MG/1
650 SUPPOSITORY RECTAL EVERY 6 HOURS PRN
Status: DISCONTINUED | OUTPATIENT
Start: 2022-03-24 | End: 2022-03-30 | Stop reason: HOSPADM

## 2022-03-24 RX ORDER — MAGNESIUM SULFATE 1 G/100ML
1000 INJECTION INTRAVENOUS PRN
Status: DISCONTINUED | OUTPATIENT
Start: 2022-03-24 | End: 2022-03-30 | Stop reason: HOSPADM

## 2022-03-24 RX ORDER — MORPHINE SULFATE 2 MG/ML
4 INJECTION, SOLUTION INTRAMUSCULAR; INTRAVENOUS EVERY 4 HOURS PRN
Status: DISCONTINUED | OUTPATIENT
Start: 2022-03-24 | End: 2022-03-26

## 2022-03-24 RX ADMIN — METOPROLOL TARTRATE 25 MG: 25 TABLET, FILM COATED ORAL at 20:28

## 2022-03-24 RX ADMIN — FENTANYL CITRATE 50 MCG: 50 INJECTION, SOLUTION INTRAMUSCULAR; INTRAVENOUS at 02:05

## 2022-03-24 RX ADMIN — MORPHINE SULFATE 2 MG: 2 INJECTION, SOLUTION INTRAMUSCULAR; INTRAVENOUS at 03:22

## 2022-03-24 RX ADMIN — MORPHINE SULFATE 4 MG: 2 INJECTION, SOLUTION INTRAMUSCULAR; INTRAVENOUS at 08:49

## 2022-03-24 RX ADMIN — ASPIRIN 81 MG: 81 TABLET, COATED ORAL at 08:49

## 2022-03-24 RX ADMIN — METOPROLOL TARTRATE 25 MG: 25 TABLET, FILM COATED ORAL at 08:49

## 2022-03-24 RX ADMIN — MORPHINE SULFATE 4 MG: 2 INJECTION, SOLUTION INTRAMUSCULAR; INTRAVENOUS at 13:21

## 2022-03-24 RX ADMIN — Medication 10 ML: at 20:28

## 2022-03-24 RX ADMIN — WARFARIN SODIUM 7.5 MG: 7.5 TABLET ORAL at 18:48

## 2022-03-24 RX ADMIN — OXYCODONE AND ACETAMINOPHEN 1 TABLET: 5; 325 TABLET ORAL at 17:26

## 2022-03-24 RX ADMIN — SODIUM CHLORIDE: 9 INJECTION, SOLUTION INTRAVENOUS at 03:22

## 2022-03-24 RX ADMIN — SODIUM CHLORIDE: 9 INJECTION, SOLUTION INTRAVENOUS at 08:57

## 2022-03-24 RX ADMIN — HEPARIN SODIUM 6400 UNITS: 1000 INJECTION INTRAVENOUS; SUBCUTANEOUS at 11:00

## 2022-03-24 RX ADMIN — FENTANYL CITRATE 50 MCG: 50 INJECTION, SOLUTION INTRAMUSCULAR; INTRAVENOUS at 05:21

## 2022-03-24 RX ADMIN — ATORVASTATIN CALCIUM 80 MG: 40 TABLET, FILM COATED ORAL at 20:28

## 2022-03-24 RX ADMIN — CALCIUM GLUCONATE 2000 MG: 98 INJECTION, SOLUTION INTRAVENOUS at 11:03

## 2022-03-24 RX ADMIN — MORPHINE SULFATE 4 MG: 2 INJECTION, SOLUTION INTRAMUSCULAR; INTRAVENOUS at 18:47

## 2022-03-24 RX ADMIN — OXYCODONE AND ACETAMINOPHEN 2 TABLET: 5; 325 TABLET ORAL at 21:27

## 2022-03-24 RX ADMIN — MORPHINE SULFATE 4 MG: 2 INJECTION, SOLUTION INTRAMUSCULAR; INTRAVENOUS at 23:12

## 2022-03-24 RX ADMIN — HEPARIN SODIUM 11.3 UNITS/KG/HR: 10000 INJECTION INTRAVENOUS; SUBCUTANEOUS at 10:59

## 2022-03-24 ASSESSMENT — PAIN SCALES - GENERAL
PAINLEVEL_OUTOF10: 8
PAINLEVEL_OUTOF10: 7
PAINLEVEL_OUTOF10: 4
PAINLEVEL_OUTOF10: 8
PAINLEVEL_OUTOF10: 5
PAINLEVEL_OUTOF10: 9
PAINLEVEL_OUTOF10: 9
PAINLEVEL_OUTOF10: 8
PAINLEVEL_OUTOF10: 9
PAINLEVEL_OUTOF10: 8
PAINLEVEL_OUTOF10: 7
PAINLEVEL_OUTOF10: 9
PAINLEVEL_OUTOF10: 7

## 2022-03-24 ASSESSMENT — PAIN DESCRIPTION - DESCRIPTORS
DESCRIPTORS: ACHING;CONSTANT;DULL
DESCRIPTORS: ACHING;CONSTANT;DISCOMFORT

## 2022-03-24 ASSESSMENT — ENCOUNTER SYMPTOMS
NAUSEA: 1
EYE DISCHARGE: 0
ALLERGIC/IMMUNOLOGIC NEGATIVE: 1
NAUSEA: 0
COUGH: 0
VOMITING: 0
TROUBLE SWALLOWING: 0
DIARRHEA: 0
ABDOMINAL PAIN: 0
EYE REDNESS: 0
STRIDOR: 0
RHINORRHEA: 0
SHORTNESS OF BREATH: 1
COLOR CHANGE: 0
SHORTNESS OF BREATH: 0
BLOOD IN STOOL: 0

## 2022-03-24 ASSESSMENT — PAIN DESCRIPTION - PROGRESSION
CLINICAL_PROGRESSION: NOT CHANGED
CLINICAL_PROGRESSION: NOT CHANGED
CLINICAL_PROGRESSION: GRADUALLY IMPROVING

## 2022-03-24 ASSESSMENT — PAIN DESCRIPTION - ONSET
ONSET: AWAKENED FROM SLEEP
ONSET: ON-GOING

## 2022-03-24 ASSESSMENT — PAIN DESCRIPTION - PAIN TYPE
TYPE: ACUTE PAIN

## 2022-03-24 ASSESSMENT — PAIN DESCRIPTION - LOCATION
LOCATION: LEG

## 2022-03-24 ASSESSMENT — PAIN DESCRIPTION - FREQUENCY
FREQUENCY: CONTINUOUS
FREQUENCY: CONTINUOUS

## 2022-03-24 ASSESSMENT — PAIN DESCRIPTION - ORIENTATION
ORIENTATION: LEFT

## 2022-03-24 NOTE — H&P
Hospitalist History & Physical      PCP: No primary care provider on file. Date of Service: Pt seen/examined on 3/24/2022     Chief Complaint:  had concerns including Fall and Leg Pain. History Of Present Illness:    Mr. Dee Dee Pepe, a 55y.o. year old male  who  has no past medical history on file. Patient presented to the emergency department after a fall. Patient was walking down steps when he had sudden onset of sharp chest pain radiating to the left side of his chest with associated shortness of breath, nausea and diaphoresis. He did not lose consciousness. He did strike his head. He is on warfarin for mechanical heart valve. Also history of pacemaker. He has knee pain as well. Vital signs are within normal limits and stable. EKG did not show any acute changes. Laboratory studies demonstrate BUN 20, creatinine 1.7, glucose 113, proBNP 234, troponin of 61. CT reveals bilateral nasal bone fractures. X-rays show fracture of the left tibial plateau. Orthopedics was consulted. Medicine consulted for admission. History reviewed. No pertinent past medical history. History reviewed. No pertinent surgical history. Prior to Admission medications    Not on File         Allergies:  Patient has no known allergies. Social History:    TOBACCO:   reports that he has been smoking cigarettes. He has been smoking about 1.00 pack per day. He has never used smokeless tobacco.  ETOH:   reports current alcohol use. Family History:    Reviewed in detail and negative for DM, CAD, Cancer, CVA. Positive as follows\"  History reviewed. No pertinent family history. REVIEW OF SYSTEMS:   Pertinent positives as noted in the HPI. All other systems reviewed and negative.     PHYSICAL EXAM:  /70   Pulse 89   Temp 98.3 °F (36.8 °C)   Resp 17   Ht 5' 9\" (1.753 m)   Wt 235 lb (106.6 kg)   SpO2 95%   BMI 34.70 kg/m²   General appearance: No apparent distress, appears stated age and cooperative. HEENT: Normal cephalic, atraumatic without obvious deformity. Pupils equal, round, and reactive to light. Extra ocular muscles intact. Conjunctivae/corneas clear. Neck: Supple, with full range of motion. No jugular venous distention. Trachea midline. Respiratory: Clear to auscultation bilaterally  Cardiovascular: Regular rate and rhythm  Abdomen: Soft, nontender, nondistended  Musculoskeletal: Left knee effusion  Skin: Normal skin color. No rashes or lesions. Neurologic:  Neurovascularly intact without any focal sensory/motor deficits. Cranial nerves: II-XII intact, grossly non-focal.  Psychiatric: Alert and oriented, thought content appropriate, normal insight    Reviewed EKG and CXR personally      CBC:   Recent Labs     03/24/22 0036   WBC 6.1   RBC 4.86   HGB 15.3   HCT 47.0   MCV 96.7   RDW 16.8*        BMP:   Recent Labs     03/24/22 0036      K 3.6      CO2 21*   BUN 20   CREATININE 1.7*   MG 1.9     LFT:  Recent Labs     03/24/22 0036   PROT 7.1   ALKPHOS 148*   ALT 27   AST 33   BILITOT 0.3     CE:  No results for input(s): Jannethalesia Robertier in the last 72 hours. PT/INR:   Recent Labs     03/24/22 0036   INR 1.7   APTT 32.0     BNP: No results for input(s): BNP in the last 72 hours. ESR: No results found for: SEDRATE  CRP: No results found for: CRP  D Dimer: No results found for: DDIMER   Folate and B12: No results found for: OEBPVUEC97, No results found for: FOLATE  Lactic Acid: No results found for: LACTA  Thyroid Studies: No results found for: TSH, P5KFPNY, D9WBDYY, THYROIDAB    Oupatient labs:  Lab Results   Component Value Date    INR 1.7 03/24/2022       Urinalysis:  No results found for: NITRU, WBCUA, BACTERIA, RBCUA, BLOODU, SPECGRAV, GLUCOSEU    Imaging:  XR KNEE LEFT (MIN 4 VIEWS)    Result Date: 3/24/2022  EXAMINATION: FOUR XRAY VIEWS OF THE LEFT KNEE 3/24/2022 1:18 am COMPARISON: None.  HISTORY: ORDERING SYSTEM PROVIDED HISTORY: fall, Pain TECHNOLOGIST PROVIDED HISTORY: Reason for exam:->fall, Pain What reading provider will be dictating this exam?->CRC FINDINGS: Minimally displaced intra-articular fracture involving the left lateral tibial plateau. No evidence of knee dislocation. Minimal displacement and step-off of the fracture fragments noted at the level of the lateral tibial plateau. The remainder of the osseous structures are normal in appearance. A moderate suprapatellar effusion is noted. No radiopaque foreign body. .     Mildly displaced intra-articular fracture involving the left lateral tibial plateau, with a knee joint effusion. No dislocation. XR TIBIA FIBULA LEFT (2 VIEWS)    Result Date: 3/24/2022  EXAMINATION: 3 XRAY VIEWS OF THE LEFT TIBIA AND FIBULA 3/24/2022 1:18 am COMPARISON: None. HISTORY: ORDERING SYSTEM PROVIDED HISTORY: fall, r/o fx TECHNOLOGIST PROVIDED HISTORY: Reason for exam:->fall, r/o fx What reading provider will be dictating this exam?->CRC FINDINGS: A minimally displaced fracture of the left lateral tibial plateau is noted, please refer to the knee radiograph report for further information. The knee and ankle joints are normally aligned. Remainder of the distal tibia and fibula are normal. A suprapatellar effusion is noted. No radiopaque foreign body or discrete soft tissue abnormalities identified. Fracture of the left lateral tibial plateau. No acute findings involving the remainder of the tibia or fibula. CT HEAD WO CONTRAST    Result Date: 3/24/2022  EXAMINATION: CT OF THE HEAD WITHOUT CONTRAST  3/24/2022 1:22 am TECHNIQUE: CT of the head was performed without the administration of intravenous contrast. Dose modulation, iterative reconstruction, and/or weight based adjustment of the mA/kV was utilized to reduce the radiation dose to as low as reasonably achievable. COMPARISON: None.  HISTORY: ORDERING SYSTEM PROVIDED HISTORY: Trauma TECHNOLOGIST PROVIDED HISTORY: Has a \"code stroke\" or \"stroke alert\" been called? ->No Reason for exam:->Trauma Decision Support Exception - unselect if not a suspected or confirmed emergency medical condition->Emergency Medical Condition (MA) What reading provider will be dictating this exam?->CRC FINDINGS: BRAIN/VENTRICLES: No hemorrhage, mass effect, midline shift or edema. No evidence of acute transcortical infarction. Diffuse cortical atrophy, chronic microvascular ischemic changes are noted. Encephalomalacia within the right inferior frontal lobe, suggesting sequela of chronic process. ORBITS: The visualized portion of the orbits demonstrate no acute abnormality. SINUSES: Minimal disease involving the left anterior ethmoid air cells. Remainder of the visualized paranasal sinuses are clear. The mastoid air cells are well aerated. SOFT TISSUES/SKULL:  Bilateral nasal bone fractures are seen. No acute calvarial fractures are identified. No acute intracranial findings. Chronic changes as above. Bilateral nasal bone fractures. CT CERVICAL SPINE WO CONTRAST    Result Date: 3/24/2022  EXAMINATION: CT OF THE CERVICAL SPINE WITHOUT CONTRAST 3/24/2022 1:22 am TECHNIQUE: CT of the cervical spine was performed without the administration of intravenous contrast. Multiplanar reformatted images are provided for review. Dose modulation, iterative reconstruction, and/or weight based adjustment of the mA/kV was utilized to reduce the radiation dose to as low as reasonably achievable. COMPARISON: None. HISTORY: ORDERING SYSTEM PROVIDED HISTORY: fall TECHNOLOGIST PROVIDED HISTORY: Reason for exam:->fall Decision Support Exception - unselect if not a suspected or confirmed emergency medical condition->Emergency Medical Condition (MA) What reading provider will be dictating this exam?->CRC FINDINGS: BONES/ALIGNMENT: Normal cervical lordosis is maintained. No fracture or dislocation.  DEGENERATIVE CHANGES: Multilevel degenerative changes are noted, with disc space narrowing, spondylosis and facet hypertrophy. C2-3: No significant spinal or foraminal stenosis. C3-4: Moderate right and severe left foraminal stenosis. C4-5: Mild left foraminal stenosis. C5-6: Mild to moderate left foraminal stenosis. C6-7: Mild to moderate bilateral foraminal stenosis. C7-T1: No significant spinal or foraminal stenosis. SOFT TISSUES: There is no prevertebral soft tissue swelling. A stent is seen within the right carotid artery. No acute findings. Multilevel degenerative changes. XR CHEST PORTABLE    Result Date: 3/24/2022  EXAMINATION: ONE XRAY VIEW OF THE CHEST 3/24/2022 1:18 am COMPARISON: None. HISTORY: ORDERING SYSTEM PROVIDED HISTORY: chest pain TECHNOLOGIST PROVIDED HISTORY: Reason for exam:->chest pain What reading provider will be dictating this exam?->CRC FINDINGS: Moderate cardiomegaly. Mild prominence of the central pulmonary vasculature is noted. Minimal parenchymal and interstitial opacities are noted, most notably along the perihilar regions. No effusion or pneumothorax. Right chest wall pacemaker, with intracardiac leads overlying the right atrium and right ventricle. Median sternotomy wires and a prosthetic cardiac valve are noted. No pneumothorax. Cardiomegaly and findings suggesting mild interstitial edema.        ASSESSMENT:  -Chest pain in adult  -Fall at home  -Nasal bone fractures  -Left tibial plateau fracture  -Elevated troponin  -CKD stage III  -History of mechanical heart valve  -Chronic anticoagulated on Coumadin  -History of pacemaker      PLAN:  -Admit to medicine  -Orthopedics consulted  -Consult ENT  -Consult cardiology for preoperative evaluation  -Cycle serial troponins  -Telemetry  -N.p.o.  -Normal saline 75 mils per hour  -Pain management        Diet: No diet orders on file  Code Status: No Order  Surrogate decision maker confirmed with patient:   Extended Emergency Contact Information  Primary Emergency Contact: casi melton  Mobile Phone: [phone]  Relation: Other  Preferred language: English   needed? No    DVT Prophylaxis: []Lovenox []Heparin []PCD [] [address] []Encouraged ambulation  Disposition: []Med/Surg [] Intermediate [] ICU/CCU  Admit status: [] Observation [] Inpatient     +++++++++++++++++++++++++++++++++++++++++++++++++  Gilda Olu, DO  +++++++++++++++++++++++++++++++++++++++++++++++++  NOTE: This report was transcribed using voice recognition software. Every effort was made to ensure accuracy; however, inadvertent computerized transcription errors may be present.

## 2022-03-24 NOTE — PROGRESS NOTES
Brief progress note    Admission H&P done this morning reviewed. Patient was seen and examined at bedside. Still reports of left lower extremity pain  Seen by orthopedic surgery, patient does have closed left tibial plateau fracture with mild displacement. Orthopedics recommended transition to hinged ROM brace. No surgical intervention. Awaiting cardiology eval  Note reviewed. Electrolyte abnormality potassium 2.9, magnesium 1.3, calcium 6.1. Repeat BMP ordered. Electrolyte supplementation ordered. On heparin drip, await cardiology evaluation for chest pain, if no intervention, resume warfarin.

## 2022-03-24 NOTE — ED NOTES
Pt refused to allow RN to redraw blood. States he would like pain medication.       Singh Lennon, MELIDA  03/24/22 5988

## 2022-03-24 NOTE — CONSULTS
Department of Orthopedic Surgery  Resident Consult Note    Reason for Consult: Left knee pain    HISTORY OF PRESENT ILLNESS:       Patient is a 55 y.o. male with left knee pain after sustaining a fall last night. He was descending steps when he experienced sudden onset sharp left-sided chest pain. This caused him to fall down the steps. Patient does not know how many steps he fell down. He noted immediate pain about the left knee. Admits head trauma but denies LOC. Patient currently takes warfarin for mechanical heart valve. He is a community ambulator without assistive device. Denies numbness/tingling/paresthesias. Denies any other orthopedic complaints at this time. Past Medical History:    History reviewed. No pertinent past medical history. Past Surgical History:    History reviewed. No pertinent surgical history. Current Medications:   Current Facility-Administered Medications: morphine (PF) injection 2 mg, 2 mg, IntraVENous, Once  Allergies:  Patient has no known allergies. Social History:   TOBACCO:   reports that he has been smoking cigarettes. He has been smoking about 1.00 pack per day. He has never used smokeless tobacco.  ETOH:   reports current alcohol use. DRUGS:   reports no history of drug use. ACTIVITIES OF DAILY LIVING:    OCCUPATION:    Family History:   History reviewed. No pertinent family history.     REVIEW OF SYSTEMS:  CONSTITUTIONAL:  negative for fevers, chills  EYES:  negative for acute changes  HEENT:  negative for acute changes  RESPIRATORY:  negative for dyspnea  CARDIOVASCULAR:  negative for chest pain, palpitations  GASTROINTESTINAL:  negative for nausea, vomiting  GENITOURINARY:  negative for frequency  HEMATOLOGIC/LYMPHATIC:  negative for bleeding and petechiae  MUSCULOSKELETAL:  positive for left knee pain  NEUROLOGICAL: Positive for head trauma, negative for LOC  BEHAVIOR/PSYCH:  negative for increased agitation and anxiety    PHYSICAL EXAM:    VITALS:  BP 133/75   Pulse 89   Temp 98.3 °F (36.8 °C)   Resp 16   Ht 5' 9\" (1.753 m)   Wt 235 lb (106.6 kg)   SpO2 98%   BMI 34.70 kg/m²   CONSTITUTIONAL:  awake, alert, cooperative, no apparent distress, and appears stated age    MUSCULOSKELETAL:  Left lower Extremity:  Skin intact circumferentially  Mild edema about the knee  Mild knee effusion present  Compartments soft and compressible  Calf soft and non tender  +PF/DF/EHL  +2/4 DP & PT pulses, Brisk Cap refill, Toes warm and perfused  Distal sensation grossly intact to Peroneals, Sural, Saphenous, and tibial nrs  Unable to clearly assess ligament integrity about the knee secondary to pain and guarding. No gross instability    Secondary Exam:   · Bilateral UE: No obvious signs of trauma. -TTP to fingers, hand, wrist, forearm, elbow, humerus, shoulder or clavicle. -- Patient able to flex/extend fingers, wrist, elbow and shoulder with active and passive ROM without pain, compartments soft and compressible. Patient has left upper extremity neurologic deficits and weakness associated with previous surgery. He is at baseline deficits with no new symptoms. · Right LE: No obvious signs of trauma. -TTP to foot, ankle, leg, knee, thigh, hip.-- Patient able to flex/extend toes, ankle, knee and hip with active and passive ROM without pain,+2/4 DP & PT pulses, cap refill <3sec, +5/5 PF/DF/EHL, distal sensation grossly intact to L4-S1 dermatomes, compartments soft and compressible.     · Pelvis: -TTP, -Log roll, -Heel strike     DATA:    CBC:   Lab Results   Component Value Date    WBC 6.1 03/24/2022    RBC 4.86 03/24/2022    HGB 15.3 03/24/2022    HCT 47.0 03/24/2022    MCV 96.7 03/24/2022    MCH 31.5 03/24/2022    MCHC 32.6 03/24/2022    RDW 16.8 03/24/2022     03/24/2022    MPV 11.7 03/24/2022     PT/INR:    Lab Results   Component Value Date    PROTIME 18.9 03/24/2022    INR 1.7 03/24/2022       Radiology Review:  X-ray left knee/tibia-fibula/CT left knee: Depressed lateral tibial plateau fracture with approximately 3.5 mm of depression. There is no split fragment, medial plateau involvement, or extension into the metaphysis. No other fractures or dislocations appreciated. IMPRESSION:  · Closed left Schatzker 3 tibial plateau fracture with mild displacement    PLAN:  · Nonweightbearing left lower extremity  · Crutches/wheeled walker for ambulation  · Compressive dressing and knee immobilizer placed in ED. Will transition to hinged ROM brace  · Recommend PT/OT  · DVT prophylaxis per admitting service  · Multimodal pain control per admitting service. One-time dose of IV morphine ordered for the patient due to increased pain from examination  · No concern for compartment syndrome at this time. Compartments are soft and compressible  · Patient's fracture can be managed on an outpatient basis. He is to follow-up with Dr. Aron Reinoso in 1 week, call for appointment  · D/C Plan: India Mayer to discharge from orthopedic standpoint once appropriate discharge plan is in place.   · Plan was discussed with attending    All questions were sought and answered during encounter    Electronically signed by Dee Guaman DO on 3/24/2022 at 3:08 AM

## 2022-03-24 NOTE — CONSULTS
OTOLARYNGOLOGY  CONSULT NOTE  3/24/2022    Physician Consulted: Dr. Chelsea Hathaway  Reason for Consult: nasal bone fx  Referring Physician: Dr. Alon Olson    ROD Ricketts is a 55 y.o. male who ENT was consulted for evaluation of nasal bone fx. Pt had a mechanical fall last night walking down the stairs, he missed a step. He did not lose consciousness or hit his head. He suffered a tibia fx. Pt denies hitting his head or nose, he denies epistaxis or congestion. 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    Review of Systems   Constitutional: Negative for chills, fatigue and fever. Eyes: Negative for discharge and redness. Respiratory: Negative for cough, shortness of breath and stridor. Gastrointestinal: Negative for nausea and vomiting. Endocrine: Negative. Genitourinary: Negative. Musculoskeletal: Negative. Skin: Negative for color change and rash. Allergic/Immunologic: Negative. Neurological: Negative for dizziness, speech difficulty and headaches. Hematological: Negative. Psychiatric/Behavioral: Negative for agitation and confusion. All other systems reviewed and are negative. History reviewed. No pertinent past medical history. History reviewed. No pertinent surgical history. Medications Prior to Admission:    Prior to Admission medications    Medication Sig Start Date End Date Taking?  Authorizing Provider   warfarin (COUMADIN) 2.5 MG tablet Take 2.5 mg by mouth daily Monday through Friday   Yes Historical Provider, MD   warfarin (COUMADIN) 5 MG tablet Take 5 mg by mouth daily Saturday AND Sunday   Yes Historical Provider, MD   metoprolol succinate (TOPROL XL) 25 MG extended release tablet Take 25 mg by mouth daily    Yes Historical Provider, MD   ARIPiprazole (ABILIFY) 30 MG tablet Take 30 mg by mouth daily   Yes Historical Provider, MD   furosemide (LASIX) 20 MG tablet Take 20 mg by mouth daily   Yes Historical Provider, MD   rosuvastatin (CRESTOR) 40 MG tablet Take 40 mg by mouth daily   Yes Historical Provider, MD       No Known Allergies    History reviewed. No pertinent family history. Social History     Tobacco Use    Smoking status: Current Every Day Smoker     Packs/day: 1.00     Types: Cigarettes    Smokeless tobacco: Never Used   Substance Use Topics    Alcohol use: Yes     Comment: occassional    Drug use: Never           PHYSICAL EXAM:    Vitals:    03/24/22 0825   BP: (!) 151/98   Pulse: 89   Resp: 18   Temp: 98.2 °F (36.8 °C)   SpO2: 96%       General Appearance:  Laying in bed, awake, alert, no apparent distress  Head/face:  NC/AT  Eyes: PERRL, EOMI  ENT: Bilateral external ears WNL, Nares patent, Septum midline,   Neck:Supple, no adenopathy  Lungs:  Non-labored, good respiratory effort, no stridor  Heart:  RR  Neuro: Facial nerve symmetric and intact. House Brackmann 1/6, bilaterally. LABS:  CBC  Recent Labs     03/24/22  0036   WBC 6.1   HGB 15.3   HCT 47.0          RADIOLOGY  XR KNEE LEFT (MIN 4 VIEWS)    Result Date: 3/24/2022  EXAMINATION: FOUR XRAY VIEWS OF THE LEFT KNEE 3/24/2022 1:18 am COMPARISON: None. HISTORY: ORDERING SYSTEM PROVIDED HISTORY: fall, Pain TECHNOLOGIST PROVIDED HISTORY: Reason for exam:->fall, Pain What reading provider will be dictating this exam?->CRC FINDINGS: Minimally displaced intra-articular fracture involving the left lateral tibial plateau. No evidence of knee dislocation. Minimal displacement and step-off of the fracture fragments noted at the level of the lateral tibial plateau. The remainder of the osseous structures are normal in appearance. A moderate suprapatellar effusion is noted. No radiopaque foreign body. .     Mildly displaced intra-articular fracture involving the left lateral tibial plateau, with a knee joint effusion. No dislocation.      XR TIBIA FIBULA LEFT (2 VIEWS)    Result Date: 3/24/2022  EXAMINATION: 3 XRAY VIEWS OF THE LEFT TIBIA AND FIBULA 3/24/2022 1:18 am COMPARISON: None. HISTORY: ORDERING SYSTEM PROVIDED HISTORY: fall, r/o fx TECHNOLOGIST PROVIDED HISTORY: Reason for exam:->fall, r/o fx What reading provider will be dictating this exam?->CRC FINDINGS: A minimally displaced fracture of the left lateral tibial plateau is noted, please refer to the knee radiograph report for further information. The knee and ankle joints are normally aligned. Remainder of the distal tibia and fibula are normal. A suprapatellar effusion is noted. No radiopaque foreign body or discrete soft tissue abnormalities identified. Fracture of the left lateral tibial plateau. No acute findings involving the remainder of the tibia or fibula. CT HEAD WO CONTRAST    Result Date: 3/24/2022  EXAMINATION: CT OF THE HEAD WITHOUT CONTRAST  3/24/2022 1:22 am TECHNIQUE: CT of the head was performed without the administration of intravenous contrast. Dose modulation, iterative reconstruction, and/or weight based adjustment of the mA/kV was utilized to reduce the radiation dose to as low as reasonably achievable. COMPARISON: None. HISTORY: ORDERING SYSTEM PROVIDED HISTORY: Trauma TECHNOLOGIST PROVIDED HISTORY: Has a \"code stroke\" or \"stroke alert\" been called? ->No Reason for exam:->Trauma Decision Support Exception - unselect if not a suspected or confirmed emergency medical condition->Emergency Medical Condition (MA) What reading provider will be dictating this exam?->CRC FINDINGS: BRAIN/VENTRICLES: No hemorrhage, mass effect, midline shift or edema. No evidence of acute transcortical infarction. Diffuse cortical atrophy, chronic microvascular ischemic changes are noted. Encephalomalacia within the right inferior frontal lobe, suggesting sequela of chronic process. ORBITS: The visualized portion of the orbits demonstrate no acute abnormality. SINUSES: Minimal disease involving the left anterior ethmoid air cells. Remainder of the visualized paranasal sinuses are clear.   The mastoid air cells are well aerated. SOFT TISSUES/SKULL:  Bilateral nasal bone fractures are seen. No acute calvarial fractures are identified. No acute intracranial findings. Chronic changes as above. Bilateral nasal bone fractures. CT CERVICAL SPINE WO CONTRAST    Result Date: 3/24/2022  EXAMINATION: CT OF THE CERVICAL SPINE WITHOUT CONTRAST 3/24/2022 1:22 am TECHNIQUE: CT of the cervical spine was performed without the administration of intravenous contrast. Multiplanar reformatted images are provided for review. Dose modulation, iterative reconstruction, and/or weight based adjustment of the mA/kV was utilized to reduce the radiation dose to as low as reasonably achievable. COMPARISON: None. HISTORY: ORDERING SYSTEM PROVIDED HISTORY: fall TECHNOLOGIST PROVIDED HISTORY: Reason for exam:->fall Decision Support Exception - unselect if not a suspected or confirmed emergency medical condition->Emergency Medical Condition (MA) What reading provider will be dictating this exam?->CRC FINDINGS: BONES/ALIGNMENT: Normal cervical lordosis is maintained. No fracture or dislocation. DEGENERATIVE CHANGES: Multilevel degenerative changes are noted, with disc space narrowing, spondylosis and facet hypertrophy. C2-3: No significant spinal or foraminal stenosis. C3-4: Moderate right and severe left foraminal stenosis. C4-5: Mild left foraminal stenosis. C5-6: Mild to moderate left foraminal stenosis. C6-7: Mild to moderate bilateral foraminal stenosis. C7-T1: No significant spinal or foraminal stenosis. SOFT TISSUES: There is no prevertebral soft tissue swelling. A stent is seen within the right carotid artery. No acute findings. Multilevel degenerative changes.      CT KNEE LEFT WO CONTRAST    Result Date: 3/24/2022  EXAMINATION: CT OF THE LEFT KNEE WITHOUT CONTRAST 3/24/2022 5:03 am TECHNIQUE: CT of the left knee was performed without the administration of intravenous contrast.  Multiplanar reformatted images are provided for review. Dose modulation, iterative reconstruction, and/or weight based adjustment of the mA/kV was utilized to reduce the radiation dose to as low as reasonably achievable. COMPARISON: March 24, 2022. HISTORY ORDERING SYSTEM PROVIDED HISTORY: fracture TECHNOLOGIST PROVIDED HISTORY: Reason for exam:->fracture What reading provider will be dictating this exam?->CRC FINDINGS: Bones: Patella: A small linear lucency is noted within the right paramedian ventral aspect of the patella, which may represent a nondisplaced fracture. The remainder of the patella is normal. Femur: No evidence of acute fracture. No lytic or blastic osseous lesions. Tibia: There is a comminuted, mildly impacted fracture involving the lateral tibial plateau, with approximately 3-4 mm of displacement of the distal fracture fragment, as well as mild offset of the fracture fragments resulting in mild depression of the tibial plateau. Fibula: No abnormalities. Joints: No evidence of knee joint dislocation. A large lipohemarthrosis is noted. Soft Tissue: Mild prepatellar soft tissue swelling. No foreign body. On the sagittal images, there is fullness and heterogeneity along the expected course of the anterior cruciate ligament. This raises suspicion for ACL injury. The PCL is normal in appearance. Comminuted, minimally displaced fracture involving the lateral tibial plateau as detailed above. No evidence of knee dislocation. Subtle linear lucency within the ventral right paramedian patella, which may represent a nondisplaced fracture. Large lipohemarthrosis. Edema and swelling along the expected course of the anterior cruciate ligament, which raises suspicion for ACL injury. XR CHEST PORTABLE    Result Date: 3/24/2022  EXAMINATION: ONE XRAY VIEW OF THE CHEST 3/24/2022 1:18 am COMPARISON: None.  HISTORY: ORDERING SYSTEM PROVIDED HISTORY: chest pain TECHNOLOGIST PROVIDED HISTORY: Reason for exam:->chest pain What reading provider will be dictating this exam?->CRC FINDINGS: Moderate cardiomegaly. Mild prominence of the central pulmonary vasculature is noted. Minimal parenchymal and interstitial opacities are noted, most notably along the perihilar regions. No effusion or pneumothorax. Right chest wall pacemaker, with intracardiac leads overlying the right atrium and right ventricle. Median sternotomy wires and a prosthetic cardiac valve are noted. No pneumothorax. Cardiomegaly and findings suggesting mild interstitial edema.          ASSESSMENT:  55 y.o. male with previous nasal bone fracture, no acute fx    PLAN:  · No acute ENT intervention  · Seen, examined, discussed with Dr. Brianna Godfrey    Electronically signed by Juan Dillon DO on 3/24/22 at 1:45 PM EDT

## 2022-03-24 NOTE — ED NOTES
Pt states that he has been experiencing chest pain x 3 days, this evening pt states he fell down stairs due to chest pain. Pt C/o left leg pain 9/10. Admits to +ETOH tonight also. Pt has wrap and brace applied to left leg from surgical resident. Patient alert and oriented x 4. Lung sounds clear bilaterally. Denies abdominal pain. No further complaints at this time. Will continue to monitor.       Aby Harrison RN  03/24/22 5604

## 2022-03-24 NOTE — ED NOTES
Department of Emergency Medicine  FIRST PROVIDER TRIAGE NOTE             Independent MLP           3/24/22  12:33 AM EDT    Date of Encounter: 3/24/22   MRN: [id number]      HPI: Carly Zhou is a 55 y.o. male who presents to the ED for Fall and Leg Pain  Patient presents emergency department with complaints originally for left-sided chest pain that been intermittent for the last 3 days. Patient reports that he was walking down the steps and at that time was experiencing chest pain. For some reason he missed a step and fell, pulling his left leg behind him now with severe left knee pain. Patient is also on Coumadin. Patient does report worsening shortness of breath over the last several days does have a history of a valve replacement. Currently at this time patient is chest pain-free    ROS: Negative for vomiting or diarrhea. PE: Gen Appearance/Constitutional: alert  HEENT: NC/NT. PERRLA,  Airway patent. Initial Plan of Care: All treatment areas with department are currently occupied. Plan to order/Initiate the following while awaiting opening in ED: labs, EKG and imaging studies.   Initiate Treatment-Testing, Proceed toTreatment Area When Bed Available for ED Attending/MLP to Continue Care    Electronically signed by ARNALDO Macias CNP   DD: 3/24/22         ARNALDO Macias CNP  03/24/22 3484

## 2022-03-24 NOTE — ED NOTES
Dr. Norris Marker bedside with pt. Pt c/o pain currently, states fentanyl is not working. Pt recently medicated for pain. Requesting to know when possible surgery may take place.       Shea Navarrete RN  03/24/22 0118

## 2022-03-24 NOTE — ED PROVIDER NOTES
ED PROVIDER NOTE    Chief Complaint   Patient presents with    Fall    Leg Pain       HPI:  3/24/22,   Time: 2:01 AM EDT       Barney Lopez is a 55 y.o. male presenting to the ED for fall. Acute onset around 11pm, was walking down the steps when he had sudden onset chest pain, sharp, left parasternal, radiated to left side of chest, associated shortness of breath, nausea, diaphoresis. No LOC. Did strike his head. On warfarin for mechanical heart valve. No recent illness. Constant throbbing left knee pain, worse w/ movement, no associated numbness in extremity. Also has hx of CHB s/p pacemaker. Chart review: hx of CHB s/p PM, mechanical AVR, HTN, HLD, depression, anxiety    Review of Systems:     Review of Systems   Constitutional: Positive for diaphoresis. Negative for appetite change, chills and fever. HENT: Negative for congestion, rhinorrhea and trouble swallowing. Eyes: Negative for visual disturbance. Respiratory: Positive for shortness of breath. Negative for cough. Cardiovascular: Positive for chest pain. Negative for leg swelling. Gastrointestinal: Positive for nausea. Negative for abdominal pain, blood in stool, diarrhea and vomiting. Genitourinary: Negative for decreased urine volume, difficulty urinating, dysuria, frequency, hematuria and urgency. Musculoskeletal: Positive for arthralgias and joint swelling. Negative for myalgias, neck pain and neck stiffness. Skin: Negative for color change. Neurological: Negative for dizziness, syncope, weakness, light-headedness, numbness and headaches.         --------------------------------------------- PAST HISTORY ---------------------------------------------  Past Medical History:   History reviewed. No pertinent past medical history. Past Surgical History:   History reviewed. No pertinent surgical history.     Social History:   Social History     Socioeconomic History    Marital status:      Spouse name: None    Number of children: None    Years of education: None    Highest education level: None   Occupational History    None   Tobacco Use    Smoking status: Current Every Day Smoker     Packs/day: 1.00     Types: Cigarettes    Smokeless tobacco: Never Used   Substance and Sexual Activity    Alcohol use: Yes     Comment: occassional    Drug use: Never    Sexual activity: None   Other Topics Concern    None   Social History Narrative    None     Social Determinants of Health     Financial Resource Strain:     Difficulty of Paying Living Expenses: Not on file   Food Insecurity:     Worried About Running Out of Food in the Last Year: Not on file    Danie of Food in the Last Year: Not on file   Transportation Needs:     Lack of Transportation (Medical): Not on file    Lack of Transportation (Non-Medical): Not on file   Physical Activity:     Days of Exercise per Week: Not on file    Minutes of Exercise per Session: Not on file   Stress:     Feeling of Stress : Not on file   Social Connections:     Frequency of Communication with Friends and Family: Not on file    Frequency of Social Gatherings with Friends and Family: Not on file    Attends Anabaptism Services: Not on file    Active Member of [address] [place] [address] or Organizations: Not on file    Attends Club or Organization Meetings: Not on file    Marital Status: Not on file   Intimate Partner Violence:     Fear of Current or Ex-Partner: Not on file    Emotionally Abused: Not on file    Physically Abused: Not on file    Sexually Abused: Not on file   Housing Stability:     Unable to Pay for Housing in the Last Year: Not on file    Number of [place] in the Last Year: Not on file    Unstable Housing in the Last Year: Not on file       Family History:   History reviewed. No pertinent family history. The patients home medications have been reviewed.     Allergies:   No Known Allergies        ---------------------------------------------------PHYSICAL Notable for the following components:       Result Value    Troponin, High Sensitivity 61 (*)     All other components within normal limits   CBC WITH AUTO DIFFERENTIAL - Abnormal; Notable for the following components:    RDW 16.8 (*)     Lymphocytes % 19.2 (*)     Monocytes % 13.7 (*)     Lymphocytes Absolute 1.18 (*)     All other components within normal limits   COMPREHENSIVE METABOLIC PANEL - Abnormal; Notable for the following components:    CO2 21 (*)     Glucose 113 (*)     CREATININE 1.7 (*)     Calcium 8.5 (*)     Alkaline Phosphatase 148 (*)     All other components within normal limits   BRAIN NATRIURETIC PEPTIDE - Abnormal; Notable for the following components:    Pro- (*)     All other components within normal limits   PROTIME-INR - Abnormal; Notable for the following components:    Protime 18.9 (*)     All other components within normal limits   SERUM DRUG SCREEN - Abnormal; Notable for the following components:    Acetaminophen Level <5.0 (*)     All other components within normal limits   APTT   MAGNESIUM   URINALYSIS       RADIOLOGY:  Interpreted personally and by Radiologist.  CT HEAD WO CONTRAST   Final Result   No acute intracranial findings. Chronic changes as above. Bilateral nasal bone fractures. CT CERVICAL SPINE WO CONTRAST   Final Result   No acute findings. Multilevel degenerative changes. XR CHEST PORTABLE   Final Result   Cardiomegaly and findings suggesting mild interstitial edema. XR TIBIA FIBULA LEFT (2 VIEWS)   Final Result   Fracture of the left lateral tibial plateau. No acute findings involving the   remainder of the tibia or fibula. XR KNEE LEFT (MIN 4 VIEWS)   Final Result   Mildly displaced intra-articular fracture involving the left lateral tibial   plateau, with a knee joint effusion. No dislocation. EKG:  This EKG is signed and interpreted by the EP.     Normal sinus rhythm, vent rate 89bpm, LAD, RBBB, no acute injury pattern, no prior EKG on file for comparison       ------------------------- NURSING NOTES AND VITALS REVIEWED ---------------------------   The nursing notes within the ED encounter and vital signs as below have been reviewed by myself. /70   Pulse 89   Temp 98.3 °F (36.8 °C)   Resp 17   Ht 5' 9\" (1.753 m)   Wt 235 lb (106.6 kg)   SpO2 95%   BMI 34.70 kg/m²   Oxygen Saturation Interpretation: Normal    The patients available past medical records and past encounters were reviewed. ------------------------------ ED COURSE/MEDICAL DECISION MAKING----------------------  Medications   sodium chloride flush 0.9 % injection 10 mL (has no administration in time range)   sodium chloride flush 0.9 % injection 10 mL (has no administration in time range)   0.9 % sodium chloride infusion (has no administration in time range)   enoxaparin (LOVENOX) injection 40 mg (has no administration in time range)   promethazine (PHENERGAN) tablet 12.5 mg (has no administration in time range)     Or   ondansetron (ZOFRAN) injection 4 mg (has no administration in time range)   polyethylene glycol (GLYCOLAX) packet 17 g (has no administration in time range)   acetaminophen (TYLENOL) tablet 650 mg (has no administration in time range)     Or   acetaminophen (TYLENOL) suppository 650 mg (has no administration in time range)   0.9 % sodium chloride infusion ( IntraVENous Rate/Dose Verify 3/24/22 5289)   morphine (PF) injection 4 mg (has no administration in time range)   fentaNYL (SUBLIMAZE) injection 50 mcg (50 mcg IntraVENous Given 3/24/22 0205)   morphine (PF) injection 2 mg (2 mg IntraVENous Given 3/24/22 0322)         Consultations:             Orthopedic surgery      Counseling: The emergency provider has spoken with the patient and discussed todays results, in addition to providing specific details for the plan of care and counseling regarding the diagnosis and prognosis.   Questions are answered at this time and they are agreeable with the plan. ED Course/Medical Decision Makin y.o. male here with fall preceded by chest pain. Found to have left tibial plateau fx. Ortho consulted. Troponin in intermediate range and given patient's hx, risk factors, and concerning story, admitted in stable condition to medicine service for further evaluation and management.       --------------------------------- IMPRESSION AND DISPOSITION ---------------------------------    IMPRESSION  1. Chest pain, unspecified type    2. Closed fracture of lateral portion of left tibial plateau, initial encounter        DISPOSITION  Disposition: Admit to telemetry  Patient condition is stable    NOTE: This report was transcribed using voice recognition software.  Every effort was made to ensure accuracy; however, inadvertent computerized transcription errors may be present    David Morin MD  Attending Emergency Physician         David Morin MD  [phone]

## 2022-03-24 NOTE — ED TRIAGE NOTES
Pt presents to ED after falling tonight. Reports left leg pain. Admits to ETOH, states he had a couple of drinks.

## 2022-03-24 NOTE — PROGRESS NOTES
Notified  that cardiology is not planning any surgical interventions. They want patient's INR between 2.5-3.0 before discharged.

## 2022-03-24 NOTE — CONSULTS
Inpatient Cardiology Consultation      Reason for Consult:  Chest Pain. Elevated Troponin. Cardiac Pre-operative Evaluation. Consulting Physician: Dr Latasha Overton    Requesting Physician:  Dr Melissa Royal    Date of Consultation: 3/24/2022    HISTORY OF PRESENT ILLNESS: 54 yo  male scheduled to follow up locally with Dr Latasha Overton. PMH:  CABG x 1 LIMA-LAD and MVR/AVR ( #23 mechanical AVR and #29 mechanical MV) 9/14/2020 (), EF 45-50% at time of CABG, CHB s/p dual chamber PPM, testicular carcinoma s/p XRT/chemotherapy in 1980's, HTN, BMI 34.7, CVA in 2015 no residuals, nephrolithiasis, depression, and ongoing  tobacco abuse. Out of ALL medications for 1 month. No car to  medications. Last telephone transmission for PPM was last week. Started taking medications again on Monday after finance was able to get them. Slipped down stairs and hit L knee. No dizziness, no LOC. [facility]-ED 3/23/2022 BP upon arrival 111/70 HR 80's , and afebrile. Na 144, 3.6-->2.9, Bun/Cr 20/1.7-->16/1.3, magnesium 1.9-->1.3, p-, troponin 61-->54-->40, albumin 2.6, LFT's WNL, WBC 6.1, Hgb 15.3, few bacteria in UA    Fentanyl, and Morphine IV in ED and placed on NSS @ 75/hr. Currently /77 HR 80's, remains afebrile, and O2 saturation 96% on RA. Denies CP, pressure, dizziness or SOB when asked during interview. Complains of feeling weak and tired. Please note: past medical records were reviewed per electronic medical record (EMR) - see detailed reports under Past Medical/ Surgical History. Past Medical History:    1. 35 pack year smoker  2. HTN  3. HLD  4. 1988 Testicular carcinoma  S/p chemotherapy and surgery ([facility])  5. 2015 CVA  6. 2015 Stent \"L neck\" after CVA [facility] in [facility]  7. 9/10/2022 BEATRIZ : Severe MR with restricted A1, A2, P2 scallops. 2 regurgiant jets. Mild functional MS. Mildly elevated gradient most likely due to mR. Valve arewa 4.1 cm.  Moderate to severe AI. NORA 1.5 cm. Small, highly mobile artheroma is seen in proximal descending thoracic aorta. EF 55-60%. 8. 9/11/2020 Bilateral carotid Dopplers: 1-39% bilateral carotid stenosis  [facility]. LC[facility] posterolateral extension 70% stenosis  10. 9/14/2020 @  LIMA-LAD, AVR (#23 [employer] bileaflet mechanical)  and MVR (  #29 [facility] [employer] bileaflet mechanical valve)  11. [facility] with coumadin INR 2.5-3.5  12. 9/17/2020 Post-op TTE: EF 55-60%. Abnormal septal motion consistent with postoperative status. DD. Bileaflet mechanical AV present  13. 9/18/2020 Developed CHB post-op EP was consulted  15. 9/22/2020 Medtronic Dual Chamber PPM inserted (). 15. 9/26/2020 Discharge from [facility] on ASA 81 mg QD, coumadin, Lipitor 80 mg QD, lopressor 25 mg BID,   16. 11/5/2020 TTE [facility]: EF 55-60%. Abnormal septal motion consistent with post-operative status. #29 [employer] bileaflet mechanical valve. MV gradient 5.4 mmHg and trival prosthetic MR. Mildly elevated RVSP (35.3 mmHg). Mild to moderate TR. #23 [employer] bileaflet mechanical aortic valve with gradients 21.9/12.3 mmHg and DI of 0.53 and trivial AI. When compared to previous TTE AV gradients are similar. No MV gradients were obtained on previous TTE. Past Surgical History:  L humerus fracture requiring surgical repair with surgical plate (resulting limited ROM),  Bilateral hand surgery, testicular carcinoma with surgical removal of testicle. Medications Prior to admit: Non listed.        Current Medications:    Current Facility-Administered Medications: sodium chloride flush 0.9 % injection 10 mL, 10 mL, IntraVENous, 2 times per day  sodium chloride flush 0.9 % injection 10 mL, 10 mL, IntraVENous, PRN  0.9 % sodium chloride infusion, 25 mL, IntraVENous, PRN  enoxaparin (LOVENOX) injection 40 mg, 40 mg, SubCUTAneous, Daily  promethazine (PHENERGAN) tablet 12.5 mg, 12.5 mg, Oral, Q6H PRN **OR** ondansetron (ZOFRAN) injection 4 mg, 4 mg, IntraVENous, Q6H PRN  polyethylene glycol (GLYCOLAX) packet 17 g, 17 g, Oral, Daily PRN  acetaminophen (TYLENOL) tablet 650 mg, 650 mg, Oral, Q6H PRN **OR** acetaminophen (TYLENOL) suppository 650 mg, 650 mg, Rectal, Q6H PRN  0.9 % sodium chloride infusion, , IntraVENous, Continuous  morphine (PF) injection 4 mg, 4 mg, IntraVENous, Q4H PRN    Allergies:  NKDA      Social History:    35 pack year smoker  ETOH: Denies  Illicit Drugs  Activity: Live with friends in 2 story home (planning on getting apartment with finance). Do not have a car. Disability for heart surgery. NOK daughter Lianne Stallings (HACNBVA). No assistive devices  Code Status: Full Code      Family History: Mother  age 70 MI. No reported PCI/CABG. No reported PPM/ICD. No DM  Biological Father unknown arias history  No full siblings    REVIEW OF SYSTEMS:     · Constitutional: + fatigue. Denies fevers, chills or night sweats  · Eyes: Denies visual changes or drainage  · ENT: Denies headaches or hearing loss. No mouth sores or sore throat. No epistaxis   · Cardiovascular: Denies chest pain, pressure or palpitations. No lower extremity swelling. · Respiratory: Denies PATEL, cough, orthopnea or PND. No hemoptysis   · Gastrointestinal: Denies hematemesis or anorexia. No hematochezia or melena    · Genitourinary: Denies urgency, dysuria or hematuria. · Musculoskeletal: + weakness. + Fall no LOC. Denies joint complaints  · Integumentary: Denies rash, hives or pruritis   · Neurological: Denies dizziness, headaches or seizures. No numbness or tingling  · Psychiatric: Denies anxiety or depression. · Endocrine: Denies temperature intolerance. No recent weight change. .  · Hematologic/Lymphatic: Denies abnormal bruising or bleeding.  No swollen lymph nodes    PHYSICAL EXAM:   /77   Pulse 88   Temp 98.3 °F (36.8 °C)   Resp 16   Ht 5' 9\" (1.753 m)   Wt 235 lb (106.6 kg)   SpO2 96%   BMI 34.70 kg/m²   CONST:  Well developed, obese  male who appears of stated age. Awake, alert and cooperative. No apparent distress. HEENT:   Head- Normocephalic, atraumatic   Eyes- Conjunctivae pink, anicteric  Throat- Oral mucosa pink and moist  Neck-  No stridor, trachea midline, no jugular venous distention. No carotid bruit. CHEST: Chest symmetrical and non-tender to palpation. No accessory muscle use or intercostal retractions  RESPIRATORY: Lung sounds - clear throughout fields   CARDIOVASCULAR:     Heart Ausculation- Regular rate and rhythm, no murmur. PV: No lower extremity edema. No varicosities. Pedal pulses palpable, no clubbing or cyanosis   ABDOMEN: Soft, non-tender to light palpation. Bowel sounds present. No palpable masses no organomegaly; no abdominal bruit  MS: Good muscle strength and tone. No atrophy or abnormal movements. : Deferred  SKIN: Warm and dry no statis dermatitis or ulcers   NEURO / PSYCH: Oriented to person, place and time. Speech clear and appropriate. Follows all commands. Flat affect     DATA:    ECG 3/22/2022:  as per Dr Fermin Mcdaniel strips: SR    Diagnostic:    CXR 3/24/2022: Cardiomegaly and findings suggesting mild interstitial edema. L Knee  Xray 3/24/2022: Mildly displaced intra-articular fracture involving the left lateral tibial plateau, with a knee joint effusion.  No dislocation. L Tib/Fib 3/24/2022: Fracture of the left lateral tibial plateau.  No acute findings involving the remainder of the tibia or fibula. CT Head 3/24/2022: No acute intracranial findings. Bilateral nasal bone fractures. CT Cervical Spine WO 3/24/2022: No acute findings.  Multilevel degenerative changes. CT L Knee WO 3/24/2022: Comminuted, minimally displaced fracture involving the lateral tibial plateau as detailed above.  No evidence of knee dislocation. Subtle linear lucency within the ventral right paramedian patella, which may represent a nondisplaced fracture. Large lipohemarthrosis.  Edema and swelling along the expected course of the anterior cruciate   ligament, which raises suspicion for ACL injury. Labs:   CBC:   Recent Labs     03/24/22 0036   WBC 6.1   HGB 15.3   HCT 47.0        BMP:   Recent Labs     03/24/22 0036 03/24/22  0531    144   K 3.6 2.9*   CO2 21* 19*   BUN 20 16   CREATININE 1.7* 1.3*   LABGLOM 42 57   CALCIUM 8.5* 6.1*     Mag:   Recent Labs     03/24/22 0036 03/24/22  0531   MG 1.9 1.3*     proBNP:   Recent Labs     03/24/22  0036   PROBNP 234*     PT/INR:   Recent Labs     03/24/22 0036   PROTIME 18.9*   INR 1.7     APTT:  Recent Labs     03/24/22 0036   APTT 32.0     CARDIAC ENZYMES:  Recent Labs     03/24/22 0036 03/24/22  0319 03/24/22  0531   TROPHS 61* 54* 40*     LIVER PROFILE:  Recent Labs     03/24/22 0036 03/24/22  0531   AST 33 24   ALT 27 18   LABALBU 3.7 2.6*     ASSESSMENT:  1. Elevated Troponin with no reported CP. Not consistent with ACS? FRANTZ  2. CABG x 1 LIMA-LAD in 2020  3. Mechanical AVR/MVR in 2020  4. [address] with coumadin has not taken in one month INR 1.7  5. CHB s/p Medtronic PPM 2020  6. HTN  7. HLD  8. BMI 34  9. Ongoing tobacco abuse  10. Limited ROM of L arm/shoulder secondary to humerus fracture    PLAN:  1. IV heparin with Coumadin for INR 2.5-3.5  2. Resume cardiac medications  3. Agree with gentle hydration  4. Supplement Magnesium/Potassium  5. No plans for any cardiac testing at this time  6. Follow up with Dr Luis Eduardo Duran in office as scheduled April 19,2022 @ 2:00 pm  7. Cardiology to sign off, please call if needed    Discussed with Dr Luis Eduardo Duran  Electronically signed by Scarlette Bamberger. COREY Torres on 3/24/2022 at 7:30 AM     Patient chart reviewed with COREY Gracia. Patient seen and examined independently. Assessment and plan were made in collaboration with COREY Gracia. 71-year-old obese chronic smoker male seen in consultation due to elevated troponin. Patient is a poor historian.   He has history of CVA in 2015, hypertension, status post mechanical mitral and aortic valve replacement with LIMA to LAD in September 2020 at [facility], status post pacemaker due to complete heart block, testicular cancer, status post XRT and chemotherapy and depression. Patient has been off his cardiac medication for approximately a month due to lack of transportation to get his medication. Patient slipped on the stairs with subsequent trauma to his left knee. He was admitted and was found to have elevated troponin. He is a poor historian admits to get occasional sharp chest pain. Physical exam is pertinent for: There is obese male laying in bed in no acute distress. Blood pressure 122/77 and pulse 88/min. Short neck, could not assess for carotid bruit or jugular venous distention. Regular heart with mechanical click present. No murmur, rub or gallop. Decreased air entry bilaterally probably due to poor inspiratory efforts. Obese abdomen, soft nontender with no abdominal bruit. No pedal edema with palpable pedal pulses. EKG revealed sinus rhythm at 89 bpm, left atrial enlargement, left anterior fascicular block, incomplete right bundle branch block with nonspecific T wave changes. CXR 3/24/2022: Cardiomegaly and findings suggesting mild interstitial edema.     L Knee  Xray 3/24/2022: Mildly displaced intra-articular fracture involving the left lateral tibial plateau, with a knee joint effusion.  No dislocation.     L Tib/Fib 3/24/2022: Fracture of the left lateral tibial plateau.  No acute findings involving the remainder of the tibia or fibula.     CT Head 3/24/2022: No acute intracranial findings. Bilateral nasal bone fractures.     CT Cervical Spine WO 3/24/2022: No acute findings.  Multilevel degenerative changes.     CT L Knee WO 3/24/2022: Comminuted, minimally displaced fracture involving the lateral tibial plateau as detailed above.  No evidence of knee dislocation.  Subtle linear lucency within the ventral right paramedian patella, which may represent a nondisplaced fracture. Large lipohemarthrosis. Edema and swelling along the expected course of the anterior cruciate   ligament, which raises suspicion for ACL injury. Labs:  High sensitivity troponin 61, 54 and 40  BUN 20 and down to 16, creatinine 1.7 and down to 1.3, potassium 2.9. Magnesium 1.3. Hematocrit 47, hemoglobin 15.3 and platelets 153. Albumin 2.6. INR 1.7.  proBNP 234. ASSESSMENT:  1. Elevated Troponin with history of sharp atypical CP. Troponin elevation not consistent with ACS. Probably due to FRANTZ. 2. CABG x 1 LIMA-LAD in 2020  3. Mechanical AVR/MVR in 2020  4. [address] with coumadin has not taken in one month INR 1.7  5. CHB s/p Medtronic PPM 2020  6. HTN  7. HLD  8. BMI 34  9. Ongoing tobacco abuse  10. Limited ROM of L arm/shoulder secondary to humerus fracture     PLAN:  1. IV heparin with Coumadin. INR goal 2.5-3.5  2. Resume cardiac medications  3. Agree with gentle hydration  4. Supplement Magnesium/Potassium to prevent cardiac arrhythmias. 5. No plans for any cardiac testing at this time  6. Follow up with Dr Марина Amaral in office as scheduled April 19,2022 @ 2:00 pm  7. Cardiology to sign off, may call if needed. Thank you for allowing me to share in the care of patient.   Thom Ng MD

## 2022-03-24 NOTE — PROGRESS NOTES
Pharmacy Consultation Note  (Warfarin Dosing and Monitoring)  Initial consult date: 3/24/22  Consulting Provider: Vahid Venegas. Judie NelsonCOREY     Lennox Bijan is a 55 y.o. male for whom pharmacy has been asked to manage warfarin therapy. Weight:   Wt Readings from Last 1 Encounters:   03/24/22 235 lb (106.6 kg)       TSH:  No results found for: TSH    Hepatic Function Panel:                            Lab Results   Component Value Date    ALKPHOS 98 03/24/2022    ALT 18 03/24/2022    AST 24 03/24/2022    PROT 4.7 03/24/2022    BILITOT <0.2 03/24/2022    LABALBU 2.6 03/24/2022       Current warfarin drug-drug interactions include: No significant interactions    Recent Labs     03/24/22  0036   HGB 15.3        Date Warfarin Dose INR Heparin or LMWH Comment   3/21-3/23 5 mg (outpatient)      3/24 7.5 mg 1.7 Heparin drip                                  Assessment:  · Patient is a 55 y.o. male on warfarin for mechanical valves x2: AVR (#23 [employer] bileaflet mechanical) and MVR (#29 [facility] bileaflet mechanical valve)  · Patient's home warfarin dosing regimen is: 2.5 mg on Saturday and Sunday; 5 mg on all other days. He had been off warfarin for a period of time and just re-started on 3/21/22. · Goal INR 2.5 - 3.5    · 3/24: INR 1.7 today. Patient states he had been off warfarin but re-started on 3/21.      Plan:  · Warfarin 7.5 mg tonight  · Daily PT/INR until the INR is stable within the therapeutic range  · Pharmacist will follow and monitor/adjust dosing as necessary    Thank you for this consult,    Rodney Velazquez, [facility] 3/24/2022 8:42 AM

## 2022-03-24 NOTE — ED NOTES
This RN responded to pt's bathroom request. Pt provided with urinal, this RN asked pt if he would like to be placed in an empty room for privacy to which pt responded \"well yea Im not an animal\". Pt rolled into an empty trauma room for privacy. Pt has complaints about being in a hallway bed and requests to know when he will be getting an admission bed. Pt requesting to \"get something for this damn pain\". Pt reports pain to be 9/10 currently. Pt also requesting water and informed that he is currently NPO. Pt states that he does not understand why he can not have anything to drink. Informed pt that if surgical team meets with him and all parties would like to move forward with surgery if needed, not having anything to eat or drink will speed up the process and allow pt to be on a road to recovery faster. Pt states \"they never even mentioned anything about surgery\". Pt medicated for pain and morning labs drawn. Pt in bed looking through phone does not appear to be in any distress at this time. Will continue to monitor.       Lewis Forman RN  03/24/22 9698

## 2022-03-25 LAB
ALBUMIN SERPL-MCNC: 3.5 G/DL (ref 3.5–5.2)
ALP BLD-CCNC: 142 U/L (ref 40–129)
ALT SERPL-CCNC: 23 U/L (ref 0–40)
ANION GAP SERPL CALCULATED.3IONS-SCNC: 14 MMOL/L (ref 7–16)
APTT: 42 SEC (ref 24.5–35.1)
APTT: 57.2 SEC (ref 24.5–35.1)
AST SERPL-CCNC: 25 U/L (ref 0–39)
BILIRUB SERPL-MCNC: 0.7 MG/DL (ref 0–1.2)
BUN BLDV-MCNC: 19 MG/DL (ref 6–20)
CALCIUM SERPL-MCNC: 9 MG/DL (ref 8.6–10.2)
CHLORIDE BLD-SCNC: 104 MMOL/L (ref 98–107)
CO2: 18 MMOL/L (ref 22–29)
CREAT SERPL-MCNC: 1.6 MG/DL (ref 0.7–1.2)
EKG ATRIAL RATE: 89 BPM
EKG P AXIS: 52 DEGREES
EKG P-R INTERVAL: 160 MS
EKG Q-T INTERVAL: 418 MS
EKG QRS DURATION: 154 MS
EKG QTC CALCULATION (BAZETT): 508 MS
EKG R AXIS: -62 DEGREES
EKG T AXIS: 55 DEGREES
EKG VENTRICULAR RATE: 89 BPM
GFR AFRICAN AMERICAN: 54
GFR NON-AFRICAN AMERICAN: 45 ML/MIN/1.73
GLUCOSE BLD-MCNC: 96 MG/DL (ref 74–99)
HCT VFR BLD CALC: 45.9 % (ref 37–54)
HEMOGLOBIN: 14.3 G/DL (ref 12.5–16.5)
INR BLD: 2
MAGNESIUM: 1.8 MG/DL (ref 1.6–2.6)
MCH RBC QN AUTO: 31.4 PG (ref 26–35)
MCHC RBC AUTO-ENTMCNC: 31.2 % (ref 32–34.5)
MCV RBC AUTO: 100.7 FL (ref 80–99.9)
PDW BLD-RTO: 16.6 FL (ref 11.5–15)
PLATELET # BLD: 101 E9/L (ref 130–450)
PMV BLD AUTO: 12.4 FL (ref 7–12)
POTASSIUM REFLEX MAGNESIUM: 3.5 MMOL/L (ref 3.5–5)
PROTHROMBIN TIME: 22.8 SEC (ref 9.3–12.4)
RBC # BLD: 4.56 E12/L (ref 3.8–5.8)
REASON FOR REJECTION: NORMAL
REJECTED TEST: NORMAL
SODIUM BLD-SCNC: 136 MMOL/L (ref 132–146)
TOTAL PROTEIN: 6.5 G/DL (ref 6.4–8.3)
WBC # BLD: 5.8 E9/L (ref 4.5–11.5)

## 2022-03-25 PROCEDURE — 97165 OT EVAL LOW COMPLEX 30 MIN: CPT

## 2022-03-25 PROCEDURE — 2580000003 HC RX 258: Performed by: NURSE PRACTITIONER

## 2022-03-25 PROCEDURE — 2140000000 HC CCU INTERMEDIATE R&B

## 2022-03-25 PROCEDURE — 6360000002 HC RX W HCPCS: Performed by: NURSE PRACTITIONER

## 2022-03-25 PROCEDURE — 85027 COMPLETE CBC AUTOMATED: CPT

## 2022-03-25 PROCEDURE — 6360000002 HC RX W HCPCS: Performed by: FAMILY MEDICINE

## 2022-03-25 PROCEDURE — 83735 ASSAY OF MAGNESIUM: CPT

## 2022-03-25 PROCEDURE — 2580000003 HC RX 258: Performed by: INTERNAL MEDICINE

## 2022-03-25 PROCEDURE — 6370000000 HC RX 637 (ALT 250 FOR IP): Performed by: INTERNAL MEDICINE

## 2022-03-25 PROCEDURE — 97161 PT EVAL LOW COMPLEX 20 MIN: CPT

## 2022-03-25 PROCEDURE — 80053 COMPREHEN METABOLIC PANEL: CPT

## 2022-03-25 PROCEDURE — 97530 THERAPEUTIC ACTIVITIES: CPT

## 2022-03-25 PROCEDURE — 85610 PROTHROMBIN TIME: CPT

## 2022-03-25 PROCEDURE — 6370000000 HC RX 637 (ALT 250 FOR IP): Performed by: FAMILY MEDICINE

## 2022-03-25 PROCEDURE — 6370000000 HC RX 637 (ALT 250 FOR IP): Performed by: NURSE PRACTITIONER

## 2022-03-25 PROCEDURE — 85730 THROMBOPLASTIN TIME PARTIAL: CPT

## 2022-03-25 PROCEDURE — 36415 COLL VENOUS BLD VENIPUNCTURE: CPT

## 2022-03-25 RX ORDER — SODIUM CHLORIDE 9 MG/ML
INJECTION, SOLUTION INTRAVENOUS CONTINUOUS
Status: DISCONTINUED | OUTPATIENT
Start: 2022-03-25 | End: 2022-03-26

## 2022-03-25 RX ORDER — WARFARIN SODIUM 7.5 MG/1
7.5 TABLET ORAL
Status: COMPLETED | OUTPATIENT
Start: 2022-03-25 | End: 2022-03-25

## 2022-03-25 RX ADMIN — HEPARIN SODIUM 10.3 UNITS/KG/HR: 10000 INJECTION INTRAVENOUS; SUBCUTANEOUS at 14:38

## 2022-03-25 RX ADMIN — METOPROLOL TARTRATE 25 MG: 25 TABLET, FILM COATED ORAL at 19:58

## 2022-03-25 RX ADMIN — ASPIRIN 81 MG: 81 TABLET, COATED ORAL at 08:51

## 2022-03-25 RX ADMIN — MORPHINE SULFATE 4 MG: 2 INJECTION, SOLUTION INTRAMUSCULAR; INTRAVENOUS at 12:13

## 2022-03-25 RX ADMIN — HEPARIN SODIUM 10.3 UNITS/KG/HR: 10000 INJECTION INTRAVENOUS; SUBCUTANEOUS at 00:41

## 2022-03-25 RX ADMIN — OXYCODONE AND ACETAMINOPHEN 2 TABLET: 5; 325 TABLET ORAL at 23:11

## 2022-03-25 RX ADMIN — METOPROLOL TARTRATE 25 MG: 25 TABLET, FILM COATED ORAL at 08:54

## 2022-03-25 RX ADMIN — HEPARIN SODIUM 3200 UNITS: 1000 INJECTION INTRAVENOUS; SUBCUTANEOUS at 00:41

## 2022-03-25 RX ADMIN — OXYCODONE AND ACETAMINOPHEN 2 TABLET: 5; 325 TABLET ORAL at 18:56

## 2022-03-25 RX ADMIN — OXYCODONE AND ACETAMINOPHEN 2 TABLET: 5; 325 TABLET ORAL at 01:38

## 2022-03-25 RX ADMIN — HYDRALAZINE HYDROCHLORIDE 5 MG: 20 INJECTION INTRAMUSCULAR; INTRAVENOUS at 01:03

## 2022-03-25 RX ADMIN — MORPHINE SULFATE 4 MG: 2 INJECTION, SOLUTION INTRAMUSCULAR; INTRAVENOUS at 19:58

## 2022-03-25 RX ADMIN — ATORVASTATIN CALCIUM 80 MG: 40 TABLET, FILM COATED ORAL at 19:58

## 2022-03-25 RX ADMIN — SODIUM CHLORIDE: 9 INJECTION, SOLUTION INTRAVENOUS at 08:54

## 2022-03-25 RX ADMIN — WARFARIN SODIUM 7.5 MG: 7.5 TABLET ORAL at 18:00

## 2022-03-25 RX ADMIN — PROMETHAZINE HYDROCHLORIDE 12.5 MG: 25 TABLET ORAL at 18:56

## 2022-03-25 RX ADMIN — MORPHINE SULFATE 4 MG: 2 INJECTION, SOLUTION INTRAMUSCULAR; INTRAVENOUS at 03:23

## 2022-03-25 RX ADMIN — OXYCODONE AND ACETAMINOPHEN 2 TABLET: 5; 325 TABLET ORAL at 08:53

## 2022-03-25 ASSESSMENT — PAIN SCALES - GENERAL
PAINLEVEL_OUTOF10: 5
PAINLEVEL_OUTOF10: 0
PAINLEVEL_OUTOF10: 6
PAINLEVEL_OUTOF10: 7
PAINLEVEL_OUTOF10: 0
PAINLEVEL_OUTOF10: 2
PAINLEVEL_OUTOF10: 10
PAINLEVEL_OUTOF10: 8
PAINLEVEL_OUTOF10: 10
PAINLEVEL_OUTOF10: 7
PAINLEVEL_OUTOF10: 10
PAINLEVEL_OUTOF10: 7
PAINLEVEL_OUTOF10: 8
PAINLEVEL_OUTOF10: 7
PAINLEVEL_OUTOF10: 4

## 2022-03-25 ASSESSMENT — PAIN DESCRIPTION - ORIENTATION: ORIENTATION: LEFT

## 2022-03-25 ASSESSMENT — PAIN DESCRIPTION - FREQUENCY: FREQUENCY: CONTINUOUS

## 2022-03-25 ASSESSMENT — PAIN DESCRIPTION - PAIN TYPE: TYPE: ACUTE PAIN

## 2022-03-25 ASSESSMENT — PAIN DESCRIPTION - ONSET: ONSET: ON-GOING

## 2022-03-25 ASSESSMENT — PAIN DESCRIPTION - LOCATION: LOCATION: LEG

## 2022-03-25 NOTE — PROGRESS NOTES
Physical Therapy    Physical Therapy Initial Assessment     Name: Amy Amaral  : 1965  MRN: [id number]      Date of Service: 3/25/2022    Evaluating PT:  Loni Lew, PT, DPT  [id number]     Room #:  6431/4411-A  Diagnosis:  Closed fracture of lateral portion of left tibial plateau, initial encounter [E60.122A]  Chest pain in adult [R07.9]  Chest pain, unspecified type [R07.9]  PMHx/PSHx:  None     Precautions:  Falls, NWB LLE, Hinge ROM brace LLE 0-30 deg  Equipment Needs:  FWW, Manual wheelchair     SUBJECTIVE:    Pt lives with roommates in a 2 story home with 7-8 stairs to enter and 1 rail. Bedroom and bathroom are on the 2nd level with full flight and 1 rail. Pt ambulated with no AD PTA. OBJECTIVE:   Initial Evaluation  Date: 3/25/22 Treatment Short Term/ Long Term   Goals   AM-PAC 6 Clicks 55/60     Was pt agreeable to Eval/treatment? Yes      Does pt have pain?  9/10 LLE     Bed Mobility  Rolling: SBA  Supine to sit: SBA  Sit to supine: NT  Scooting: SBA  Modified Independent     Transfers Sit to stand: SBA  Stand to sit: SBA  Stand pivot: Mod>Min A with FWW   Modified Independent with FWW or B crutches    Ambulation    Few feet x3 reps with FWW Mod>Min A with repetition   >25 feet with FWW or B crutches Modified Independent     Stair negotiation: ascended and descended  NT  >2 steps with 2 rail Min A  Vs.  4 steps posterior approach Min A   ROM BUE:  Per OT eval  BLE:  WFL, LLE locked 0-30 deg flexion at knee     Strength BUE:  Per OT eval   RLE:  Grossly 5/5  LLE:  Grossly 3/5 within available planes      Balance Sitting EOB:  Independent   Dynamic Standing:  Mod>min A  Sitting EOB:  Independent   Dynamic Standing:  Modified Independent       Pt is A & O x 4  Sensation:  Pt denies numbness and tingling to extremities  Edema:  Unremarkable      Therapeutic Exercises:    Stand pivot x3 reps     Patient education  Pt educated on PT role, safety during functional mobility, NWB LLE, Hinge ROM brace, transfer technique and FWW usage    Patient response to education:   Pt verbalized understanding Pt demonstrated skill Pt requires further education in this area   Yes  Yes  Yes      ASSESSMENT:    Conditions Requiring Skilled Therapeutic Intervention:    [x]Decreased strength     []Decreased ROM  [x]Decreased functional mobility  [x]Decreased balance   [x]Decreased endurance   []Decreased posture  []Decreased sensation  []Decreased coordination   []Decreased vision  []Decreased safety awareness   [x]Increased pain       Comments:  Pt received supine and agreeable to PT evaluation with OT collaboration. Vitals monitored during session. Reviewed NWB status of LLE and precautions. Required assistance to stand and pivot to chair. Poor WB compliance on first attempt. Pt given verbal and visual demonstration of two different methods of transferring using FWW (hopping and heel-toe method). Pt then improved ability to maintain NWB status. Performed transfer x2 more times for practice and explanation. Still required verbal cues and light assistance. Pt would benefit from continued PT services at discharge  Pt left with call button in reach, lines attached, and needs met. Treatment:  Patient practiced and was instructed in the following treatment:     Bed mobility training - pt given verbal and tactile cues to facilitate proper sequencing and safety during rolling and supine>sit as well as provided with physical assistance to complete task     STS and pivot transfer training - pt educated on proper hand and foot placement, safety and sequencing, and use of FWW to safely complete sit<>stand and pivot transfers with hands on assistance to complete task safely     Verbal and visual demonstration with hands on practice of pivot technique using FWW bed<>chair x3 reps     Pt's/ family goals   1. Return to PLOF    Prognosis is fair for reaching above PT goals.     Patient and or family understand(s) diagnosis, prognosis, and plan of care. yes    PHYSICAL THERAPY PLAN OF CARE:    PT POC is established based on physician order and patient diagnosis     Referring provider/PT Order:    03/25/22 0900  PT eval and treat  Start:  03/25/22 0900,   End:  03/25/22 0900,   ONE TIME,   Standing Count:  1 Occurrences,   R         Stacie Cruz MD   Diagnosis:  Closed fracture of lateral portion of left tibial plateau, initial encounter [S82.122A]  Chest pain in adult [R07.9]  Chest pain, unspecified type [R07.9]  Specific instructions for next treatment:  Continue transfer practice using FWW vs B crutches, stairs as able      Current Treatment Recommendations:     [x] Strengthening to improve independence with functional mobility   [x] ROM to improve independence with functional mobility   [x] Balance Training to improve static/dynamic balance and to reduce fall risk  [x] Endurance Training to improve activity tolerance during functional mobility   [x] Transfer Training to improve safety and independence with all functional transfers   [x] Gait Training to improve gait mechanics, endurance and asses need for appropriate assistive device  [x] Stair Training in preparation for safe discharge home and/or into the community   [x] Positioning to prevent skin breakdown and contractures  [x] Safety and Education Training   [x] Patient/Caregiver Education   [x] HEP  [] Other     PT long term treatment goals are located in above grid    Frequency of treatments: 2-5x/week x 1-2 weeks. Time in  0910  Time out  0935    Total Treatment Time  10 minutes     Evaluation Time includes thorough review of current medical information, gathering information on past medical history/social history and prior level of function, completion of standardized testing/informal observation of tasks, assessment of data and education on plan of care and goals.     CPT codes:  [x] Low Complexity PT evaluation 84704  [] Moderate Complexity PT evaluation [id number]  [] High Complexity PT evaluation [id number]  [] PT Re-evaluation [id number]  [] Gait training 28589 -- minutes  [] Manual therapy [id number] -- minutes  [x] Therapeutic activities 33812 10 minutes  [] Therapeutic exercises 51979 -- minutes  [] Neuromuscular reeducation 66632 -- minutes     Consuelo Sagastume, PT, DPT  [id number]

## 2022-03-25 NOTE — CARE COORDINATION
Care Coordination: Met with pt at the bedside to discuss transition of care upon discharge. Lives with 3 room mates, 2 story home with steps. States he will be completely independent, as he has a leg brace on. States his room mate has a walker if needed that he will be able to use. No hx of hhc or jann. Declines need for hhc. Room mate will  upon discharge. Uses [facility] on [facility] and follows with Dr Broderick Alexander.  He states he was having INR's and coumadin management in [place], but now moved local and Dr Teressa Bray will be managing    Barbra Vázquez

## 2022-03-25 NOTE — PROGRESS NOTES
Occupational Therapy  OCCUPATIONAL THERAPY INITIAL EVALUATION    [facility] [address] [address] [facility]  [facility], [address] [place] [address]                                                Patient Name: Kyle Ricketts  MRN: [id number]  : 1965  Room: [facility]3276-N    Evaluating OT: Selvin De León OTR/L #[id number]     Referring Provider: Riki Baig DO  Specific Provider Orders/Date: OT eval and treat 3/24/22    Diagnosis: Closed fracture of lateral portion of left tibial plateau, initial encounter [S82.122A]  Chest pain in adult [R07.9]  Chest pain, unspecified type [R07.9]   Pt admitted to hospital following fall down steps. L tib plateau fx    Pertinent Medical History:  has no past medical history on file.        Precautions:  Fall Risk, NWB L LE, L hinged knee brace 0-30 degrees    Assessment of current deficits    [x] Functional mobility  [x]ADLs  [x] Strength               []Cognition    [x] Functional transfers   [x] IADLs         [x] Safety Awareness   [x]Endurance    [] Fine Coordination              [x] Balance      [] Vision/perception   []Sensation     []Gross Motor Coordination  [] ROM  [] Delirium                   [] Motor Control     OT PLAN OF CARE   OT POC based on physician orders, patient diagnosis and results of clinical assessment    Frequency/Duration 1-3 days/wk for 2 weeks PRN   Specific OT Treatment Interventions to include:   * Instruction/training on adapted ADL techniques and AE recommendations to increase functional independence within precautions       * Training on energy conservation strategies, correct breathing pattern and techniques to improve independence/tolerance for self-care routine  * Functional transfer/mobility training/DME recommendations for increased independence, safety, and fall prevention  * Patient/Family education to increase follow through with safety techniques and functional independence  * Recommendation of environmental modifications for increased safety with functional transfers/mobility and ADLs  * Therapeutic exercise to improve motor endurance, ROM, and functional strength for ADLs/functional transfers  * Therapeutic activities to facilitate/challenge dynamic balance, stand tolerance for increased safety and independence with ADLs  * Therapeutic activities to facilitate gross/fine motor skills for increased independence with ADLs  * Neuro-muscular re-education: facilitation of righting/equilibrium reactions, midline orientation, scapular stability/mobility, normalization of muscle tone, and facilitation of volitional active controled movement      Recommended Adaptive Equipment:  W/w, BSC, AE prn    Home Living: Pt lives with roommates in a 2 story home with 7-8 CECIL and 1 hand rail. Bed/bath on 2nd floor    Bathroom setup: tub/shower combo    Equipment owned: none    Prior Level of Function: Independent with ADLs , Independent with IADLs; ambulated without AD   Driving: yes   Occupation:      Pain Level: Pt reports 9/10 L LE pain ; pt educated on NWB status.  Pt reports being medicated prior to session    Cognition: A&O: 4/4; Follows 2 step directions   Memory:  good   Sequencing: fair   Problem solving:  fair   Judgement/safety:  Fair-     Functional Assessment:  AM-PAC Daily Activity Raw Score: 16/24   Initial Eval Status  Date: 3/25/22 Treatment Status  Date: STGs = LTGs  Time frame: 10-14 days   Feeding Independent      Grooming Supervision     seated  Independent    UB Dressing Supervision   Independent    LB Dressing Moderate Assist   Modified [place]    Bathing Moderate Assist  Modified [place]    Toileting Moderate Assist   Modified [place]    Bed Mobility  Supine to sit: Stand by Assist   Sit to supine: NT   Supine to sit: Modified [place]   Sit to supine: Modified [place]    Functional Transfers Minimal Assist   Sit to stands    Pivot initially mod A  Modified [place]    Functional Mobility Moderate Assist     Several small steps with w/w while maintaining NWB  Modified [place]    Balance Sitting:     Static:  SBA    Dynamic:SBA  Standing: min A at w/w maintaining NWB status     Activity Tolerance F-  F+   Visual/  Perceptual Glasses: yes  wfl                     Hand Dominance right   Strength ROM Additional Info:    RUE   4/5 wfl fair  and fair FMC/dexterity noted during ADL tasks     LUE 4/5 wfl good  and wfl FMC/dexterity noted during ADL tasks     Hearing: wfl  Sensation:wfl  Tone: wfl  Edema:none noted     Comments: Upon arrival patient supine in bed and agreeable to OT Session. Therapist educated pt on role of OT. At end of session, patient seated in chair with call light and phone within reach, all lines and tubes intact. Overall patient demonstrated decreased independence and safety during completion of ADL/functional transfer/mobility tasks. Pt would benefit from continued skilled OT to increase safety and independence with completion of ADL/IADL tasks for functional independence and quality of life. Treatment: OT treatment provided this date includes: Therapist educated pt on role of OT and NWB precautions / hinged knee brace. Therapist facilitated bed mobility, unsupported sitting balance,  functional transfers (various surfaces; bed, chair), standing tolerance tasks at w/w (addressing NWB status, posture, balance and activity tolerance) and several hops with w/w (cuing on weightbearing status and technique) - skilled cuing on NWB status,  hand / LE positioning, body mechanics and safety. Therapist facilitated self-care retraining: simulated UB/LB dressing techniques and grooming task educating pt on sequencing, modified techniques, posture, safety and energy conservation techniques.  Skilled monitoring of HR, O2 sats and pts response to treatment       Rehab Potential: Good for established goals     Patient / Family Goal: regain independence       Patient and/or family were instructed on functional diagnosis, prognosis/goals and OT plan of care. Demonstrated fair understanding. Eval Complexity: Low    Time In: 910  Time Out: 935  Total Treatment Time: 10 minutes    Min Units   OT Eval Low 97165  x 1   OT Eval Medium 52871      OT Eval High 18916      OT Re-Eval [id number]       Therapeutic Ex 12770       Therapeutic Activities 91233  8  1   ADL/Self Care 23318  2     Orthotic Management 40725       Manual [id number]     Neuro Re-Ed 98558       Non-Billable Time          Evaluation Time additionally includes thorough review of current medical information, gathering information on past medical history/social history and prior level of function, interpretation of standardized testing/informal observation of tasks, assessment of data and development of plan of care and goals.           Chema Gardiner OTR/L #[id number]

## 2022-03-25 NOTE — PROGRESS NOTES
Hospitalist Progress Note      SYNOPSIS: Patient admitted on 3/24/2022 for Chest pain      SUBJECTIVE:    Patient seen and examined at bedside. Still reports of pain at the fracture site in left leg. Records reviewed. Stable overnight. No other overnight issues reported. Temp (24hrs), Av °F (36.7 °C), Min:97.7 °F (36.5 °C), Max:98.2 °F (36.8 °C)    DIET: ADULT DIET; Regular  CODE: Full Code    Intake/Output Summary (Last 24 hours) at 3/25/2022 1210  Last data filed at 3/25/2022 0328  Gross per 24 hour   Intake 790 ml   Output 1125 ml   Net -335 ml       OBJECTIVE:    /73   Pulse 88   Temp 98.2 °F (36.8 °C) (Oral)   Resp 24   Ht 5' 9\" (1.753 m)   Wt 235 lb (106.6 kg)   SpO2 95%   BMI 34.70 kg/m²     General appearance: No apparent distress, appears stated age and cooperative. Enderabram Lorenzo Respiratory: Clear to auscultation bilaterally, normal respiratory effort  Cardiovascular: Regular rate rhythm, normal S1-S2  Abdomen: Soft, nontender, nondistended  Musculoskeletal: Left lower extremity in hinged ROM brace neurologic: awake, alert and following commands     Assessment and plan    Patient, 78-year-old male with past medical history of CAD s/p CABG, mechanical AVR/MVR, complete heart block s/p dual chamber pacemaker placement, testicular carcinoma s/p chemoradiation therapy in 1980s, hypertension, CVA in , depression, was admitted on 3/24/2022 after a mechanical fall resulted in left tibial plateau fracture. Patient was seen orthopedic surgery, recommended conservative management, no surgical intervention. Patient was seen by cardiology. Patient was out of all his medication for about 1 month including Coumadin. INR subtherapeutic at the time of admission. Started on IV heparin drip with Coumadin.       ASSESSMENT:    Chest pain-atypical likely was consulted  Left tibial plateau fracture  S/p mechanical AVR/MVR  CAD s/p CABG  Complete heart block s/p PPM  Hypertension  History of CVA PLAN:    Continue IV heparin drip along with warfarin  INR today 2.0. Goal INR 2.5-3.5  Cardiology evaluation appreciated. No other intervention at this time. Orthopedic surgery evaluation appreciated.   Recommend nonsurgical intervention  Pain management with Percocet/morphine as needed  PT/OT evaluation  Seen by ENT for facial fracture, no intervention recommended  CKD stage III  DVT prophylaxis-patient on anticoagulation      DVT Prophylaxis [] Lovenox, [x]  Heparin, [] SCDs, [] Ambulation   GI Prophylaxis [] PPI,  [] H2 Blocker,  [] Carafate,  [] Diet/Tube Feeds   Disposition Patient requires continued admission due to awaiting goal INR 2.5-3.5   MDM [] Low, [] Moderate,[x]  High  Patient's risk as above       Medications:  REVIEWED DAILY    Infusion Medications    sodium chloride 75 mL/hr at 03/25/22 0854    sodium chloride      heparin (PORCINE) Infusion 10.3 Units/kg/hr (03/25/22 0041)     Scheduled Medications    warfarin  7.5 mg Oral Once    sodium chloride flush  10 mL IntraVENous 2 times per day    metoprolol tartrate  25 mg Oral BID    aspirin  81 mg Oral Daily    atorvastatin  80 mg Oral Nightly    warfarin placeholder: dosing by pharmacy   Other RX Placeholder     PRN Meds: sodium chloride flush, sodium chloride, promethazine **OR** ondansetron, polyethylene glycol, acetaminophen **OR** acetaminophen, morphine, heparin (porcine), heparin (porcine), potassium chloride, magnesium sulfate, oxyCODONE-acetaminophen **OR** oxyCODONE-acetaminophen, hydrALAZINE    Labs:     Recent Labs     03/24/22  0036 03/25/22  0521   WBC 6.1 5.8   HGB 15.3 14.3   HCT 47.0 45.9    101*       Recent Labs     03/24/22  0531 03/24/22  1141 03/25/22  0521    139 136   K 2.9* 3.9 3.5   * 107 104   CO2 19* 22 18*   BUN 16 19 19   CREATININE 1.3* 1.5* 1.6*   CALCIUM 6.1* 8.3* 9.0       Recent Labs     03/24/22  0036 03/24/22  0531 03/25/22  0521   PROT 7.1 4.7* 6.5   ALKPHOS 148* 98 142*   ALT 27 18 23   AST 33 24 25   BILITOT 0.3 <0.2 0.7       Recent Labs     03/24/22  0036 03/25/22  0807   INR 1.7 2.0       No results for input(s): Tan Dove in the last 72 hours. Chronic labs:    Lab Results   Component Value Date    CHOL 86 03/24/2022    TRIG 149 03/24/2022    HDL 28 03/24/2022    LDLCALC 28 03/24/2022    TSH 1.560 03/24/2022    INR 2.0 03/25/2022    LABA1C 5.2 03/24/2022       Radiology: REVIEWED DAILY    +++++++++++++++++++++++++++++++++++++++++++++++++  Elizabeth Cordova MD  [facility] [place] Physician - [address] [place] [address]  +++++++++++++++++++++++++++++++++++++++++++++++++  NOTE: This report was transcribed using voice recognition software. Every effort was made to ensure accuracy; however, inadvertent computerized transcription errors may be present.

## 2022-03-25 NOTE — PROGRESS NOTES
Department of Orthopedic Surgery  Resident Progress Note    Patient seen and examined. Pain controlled. No new complaints. Denies chest pain, shortness of breath, dizziness/lightheadedness. Currently ongoing medical management for elevated troponin. On warfarin.      VITALS:  BP (!) 171/98   Pulse 88   Temp 97.7 °F (36.5 °C) (Oral)   Resp 16   Ht 5' 9\" (1.753 m)   Wt 235 lb (106.6 kg)   SpO2 95%   BMI 34.70 kg/m²     General: alert and oriented to person, place and time, well-developed and well-nourished, in no acute distress    MUSCULOSKELETAL:   left lower extremity:  · Dressing C/D/I  · Compartments soft and compressible  · +PF/DF/EHL  · +2/4 DP & PT pulses, Brisk Cap refill, Toes warm and perfused  · Distal sensation grossly intact to Peroneals, Sural, Saphenous, and tibial nrs    CBC:   Lab Results   Component Value Date    WBC 5.8 03/25/2022    HGB 14.3 03/25/2022    HCT 45.9 03/25/2022     03/25/2022     PT/INR:    Lab Results   Component Value Date    PROTIME 18.9 03/24/2022    INR 1.7 03/24/2022        ASSESSMENT  · JESUS Troncoso 3 tibial plateau fracture    PLAN      · Continue physical therapy and protocol: NWB - LLE hinged ROM brace 0-30  · Deep venous thrombosis prophylaxis - per primary, early mobilization  · No acute orthopedic intervention planned  · PT/OT  · Pain Control: IV and PO  · Follow with Dr. Tam Juarez as an out patient following DC  · D/C Plan:  Per primary, OK for DC from an orthopedic standpoint once medical management is complete and appropriate dc plan is in place

## 2022-03-25 NOTE — PROGRESS NOTES
Pharmacy Consultation Note  (Warfarin Dosing and Monitoring)  Initial consult date: 3/24/22  Consulting Provider: Adrian Iraheta. COREY Ya     Nik Dano is a 55 y.o. male for whom pharmacy has been asked to manage warfarin therapy. Weight:   Wt Readings from Last 1 Encounters:   03/24/22 235 lb (106.6 kg)       TSH:    Lab Results   Component Value Date    TSH 1.560 03/24/2022       Hepatic Function Panel:                            Lab Results   Component Value Date    ALKPHOS 142 03/25/2022    ALT 23 03/25/2022    AST 25 03/25/2022    PROT 6.5 03/25/2022    BILITOT 0.7 03/25/2022    LABALBU 3.5 03/25/2022       Current warfarin drug-drug interactions include: No significant interactions    Recent Labs     03/24/22  0036 03/25/22  0521   HGB 15.3 14.3    101*     Date Warfarin Dose INR Heparin or LMWH Comment   3/21-3/23 5 mg (outpatient)      3/24 7.5 mg 1.7 Heparin drip    3/25 7.5 mg 2 Heparin drip                           Assessment:  · Patient is a 55 y.o. male on warfarin for mechanical valves x2: AVR (#23 [employer] bileaflet mechanical) and MVR (#29 [employer] [place] bileaflet mechanical valve)  · Patient's home warfarin dosing regimen is: 2.5 mg on Saturday and Sunday; 5 mg on all other days. He had been off warfarin for a period of time and just re-started on 3/21/22. · Goal INR 2.5 - 3.5    · 3/24: INR 1.7 today. Patient states he had been off warfarin but re-started on 3/21. · 3/25: INR 2 today.      Plan:  ·   · Daily PT/INR until the INR is stable within the therapeutic range  · Pharmacist will follow and monitor/adjust dosing as necessary    Thank you for this consult,    Andressa Lew, [facility] 3/25/2022 8:04 AM

## 2022-03-26 LAB
ALBUMIN SERPL-MCNC: 3.5 G/DL (ref 3.5–5.2)
ALP BLD-CCNC: 126 U/L (ref 40–129)
ALT SERPL-CCNC: 20 U/L (ref 0–40)
ANION GAP SERPL CALCULATED.3IONS-SCNC: 17 MMOL/L (ref 7–16)
APTT: 53.5 SEC (ref 24.5–35.1)
AST SERPL-CCNC: 22 U/L (ref 0–39)
BILIRUB SERPL-MCNC: 0.6 MG/DL (ref 0–1.2)
BUN BLDV-MCNC: 24 MG/DL (ref 6–20)
CALCIUM SERPL-MCNC: 9.2 MG/DL (ref 8.6–10.2)
CHLORIDE BLD-SCNC: 104 MMOL/L (ref 98–107)
CO2: 16 MMOL/L (ref 22–29)
CREAT SERPL-MCNC: 1.8 MG/DL (ref 0.7–1.2)
GFR AFRICAN AMERICAN: 48
GFR NON-AFRICAN AMERICAN: 39 ML/MIN/1.73
GLUCOSE BLD-MCNC: 86 MG/DL (ref 74–99)
HCT VFR BLD CALC: 43.4 % (ref 37–54)
HEMOGLOBIN: 13.8 G/DL (ref 12.5–16.5)
INR BLD: 2.7
MCH RBC QN AUTO: 31.7 PG (ref 26–35)
MCHC RBC AUTO-ENTMCNC: 31.8 % (ref 32–34.5)
MCV RBC AUTO: 99.5 FL (ref 80–99.9)
PDW BLD-RTO: 17 FL (ref 11.5–15)
PLATELET # BLD: 102 E9/L (ref 130–450)
PMV BLD AUTO: 12.4 FL (ref 7–12)
POTASSIUM REFLEX MAGNESIUM: 3.9 MMOL/L (ref 3.5–5)
PROTHROMBIN TIME: 30.1 SEC (ref 9.3–12.4)
RBC # BLD: 4.36 E12/L (ref 3.8–5.8)
SODIUM BLD-SCNC: 137 MMOL/L (ref 132–146)
TOTAL PROTEIN: 6.9 G/DL (ref 6.4–8.3)
WBC # BLD: 5.8 E9/L (ref 4.5–11.5)

## 2022-03-26 PROCEDURE — 85027 COMPLETE CBC AUTOMATED: CPT

## 2022-03-26 PROCEDURE — 85730 THROMBOPLASTIN TIME PARTIAL: CPT

## 2022-03-26 PROCEDURE — 80053 COMPREHEN METABOLIC PANEL: CPT

## 2022-03-26 PROCEDURE — 85610 PROTHROMBIN TIME: CPT

## 2022-03-26 PROCEDURE — 6360000002 HC RX W HCPCS: Performed by: FAMILY MEDICINE

## 2022-03-26 PROCEDURE — 6370000000 HC RX 637 (ALT 250 FOR IP)

## 2022-03-26 PROCEDURE — 6370000000 HC RX 637 (ALT 250 FOR IP): Performed by: NURSE PRACTITIONER

## 2022-03-26 PROCEDURE — 6360000002 HC RX W HCPCS: Performed by: INTERNAL MEDICINE

## 2022-03-26 PROCEDURE — 6370000000 HC RX 637 (ALT 250 FOR IP): Performed by: FAMILY MEDICINE

## 2022-03-26 PROCEDURE — 2140000000 HC CCU INTERMEDIATE R&B

## 2022-03-26 PROCEDURE — 97530 THERAPEUTIC ACTIVITIES: CPT

## 2022-03-26 PROCEDURE — 36415 COLL VENOUS BLD VENIPUNCTURE: CPT

## 2022-03-26 PROCEDURE — 2580000003 HC RX 258: Performed by: FAMILY MEDICINE

## 2022-03-26 RX ORDER — GUAIFENESIN/DEXTROMETHORPHAN 100-10MG/5
5 SYRUP ORAL EVERY 4 HOURS PRN
Status: DISCONTINUED | OUTPATIENT
Start: 2022-03-26 | End: 2022-03-30 | Stop reason: HOSPADM

## 2022-03-26 RX ORDER — WARFARIN SODIUM 5 MG/1
5 TABLET ORAL
Status: COMPLETED | OUTPATIENT
Start: 2022-03-26 | End: 2022-03-26

## 2022-03-26 RX ORDER — MORPHINE SULFATE 2 MG/ML
1 INJECTION, SOLUTION INTRAMUSCULAR; INTRAVENOUS EVERY 4 HOURS PRN
Status: DISCONTINUED | OUTPATIENT
Start: 2022-03-26 | End: 2022-03-30 | Stop reason: HOSPADM

## 2022-03-26 RX ADMIN — OXYCODONE AND ACETAMINOPHEN 2 TABLET: 5; 325 TABLET ORAL at 09:07

## 2022-03-26 RX ADMIN — OXYCODONE AND ACETAMINOPHEN 2 TABLET: 5; 325 TABLET ORAL at 20:15

## 2022-03-26 RX ADMIN — MORPHINE SULFATE 4 MG: 2 INJECTION, SOLUTION INTRAMUSCULAR; INTRAVENOUS at 01:45

## 2022-03-26 RX ADMIN — WARFARIN SODIUM 5 MG: 5 TABLET ORAL at 18:03

## 2022-03-26 RX ADMIN — METOPROLOL TARTRATE 25 MG: 25 TABLET, FILM COATED ORAL at 20:14

## 2022-03-26 RX ADMIN — ASPIRIN 81 MG: 81 TABLET, COATED ORAL at 09:07

## 2022-03-26 RX ADMIN — ATORVASTATIN CALCIUM 80 MG: 40 TABLET, FILM COATED ORAL at 20:15

## 2022-03-26 RX ADMIN — OXYCODONE AND ACETAMINOPHEN 2 TABLET: 5; 325 TABLET ORAL at 04:56

## 2022-03-26 RX ADMIN — METOPROLOL TARTRATE 25 MG: 25 TABLET, FILM COATED ORAL at 09:07

## 2022-03-26 RX ADMIN — MORPHINE SULFATE 1 MG: 2 INJECTION, SOLUTION INTRAMUSCULAR; INTRAVENOUS at 23:19

## 2022-03-26 RX ADMIN — MORPHINE SULFATE 4 MG: 2 INJECTION, SOLUTION INTRAMUSCULAR; INTRAVENOUS at 17:58

## 2022-03-26 RX ADMIN — Medication 10 ML: at 09:08

## 2022-03-26 ASSESSMENT — PAIN DESCRIPTION - FREQUENCY: FREQUENCY: CONTINUOUS

## 2022-03-26 ASSESSMENT — PAIN SCALES - GENERAL
PAINLEVEL_OUTOF10: 0
PAINLEVEL_OUTOF10: 2
PAINLEVEL_OUTOF10: 3
PAINLEVEL_OUTOF10: 9
PAINLEVEL_OUTOF10: 9
PAINLEVEL_OUTOF10: 2
PAINLEVEL_OUTOF10: 9
PAINLEVEL_OUTOF10: 3
PAINLEVEL_OUTOF10: 8
PAINLEVEL_OUTOF10: 9
PAINLEVEL_OUTOF10: 2
PAINLEVEL_OUTOF10: 9

## 2022-03-26 ASSESSMENT — ENCOUNTER SYMPTOMS
NAUSEA: 0
WHEEZING: 0
ABDOMINAL PAIN: 0
CONSTIPATION: 1
DIARRHEA: 0
COUGH: 0
SHORTNESS OF BREATH: 1
SORE THROAT: 0
CHEST TIGHTNESS: 0

## 2022-03-26 ASSESSMENT — PAIN DESCRIPTION - DESCRIPTORS: DESCRIPTORS: STABBING;SHOOTING;SHARP

## 2022-03-26 ASSESSMENT — PAIN DESCRIPTION - ONSET: ONSET: ON-GOING

## 2022-03-26 ASSESSMENT — PAIN DESCRIPTION - PAIN TYPE: TYPE: ACUTE PAIN

## 2022-03-26 ASSESSMENT — PAIN SCALES - WONG BAKER
WONGBAKER_NUMERICALRESPONSE: 2
WONGBAKER_NUMERICALRESPONSE: 0
WONGBAKER_NUMERICALRESPONSE: 8
WONGBAKER_NUMERICALRESPONSE: 8

## 2022-03-26 ASSESSMENT — PAIN DESCRIPTION - LOCATION: LOCATION: LEG

## 2022-03-26 ASSESSMENT — PAIN DESCRIPTION - ORIENTATION: ORIENTATION: LEFT

## 2022-03-26 NOTE — PROGRESS NOTES
INTERNAL MEDICINE PROGRESS NOTE          CHIEF COMPLAINT    Chief Complaint   Patient presents with    Fall    Leg Pain     [address]  · 61-year-old male with past medical history of CAD s/p CABG, mechanical AVR/MVR, complete heart block s/p dual chamber pacemaker placement, testicular carcinoma s/p chemoradiation therapy in 1980s, hypertension, CVA in 2015, depression, was admitted on 3/24/2022 after a mechanical fall resulted in left tibial plateau fracture. Patient was seen orthopedic surgery, recommended conservative management, no surgical intervention. Patient was seen by cardiology. Patient was out of all his medication for about 1 month including Coumadin. INR subtherapeutic at the time of admission. Started on IV heparin drip with Coumadin. INR became therapeutic on 3/26 will monitor overnight continue to have left lower extremity pain    1. S/p left tibial fracture  2. Mechanical heart valve aortic and mitral  3. Coronary Artery Disease History of cabg  4. Complete heart block history of pacemaker  5. Hypertension  6. History of CVA  7. Elevated troponin ACS ruled out    Patient seen by orthopedic recommended conservative management outpatient follow-up with Dr. Corrales  In regards to mechanical heart valve patient on IV heparin with Coumadin bridge INR is 2.7 today we will stop heparin and monitor with Coumadin pharmacy to dose. Outpatient follow-up with cardio Follow up with Dr Trudi Contreras in office as scheduled April 70,0877 @ 2:00 pm   expected discharge within 24 hours  Patient continues to have intractable pain in left lower extremity will wean off pain medications and expected discharge within 24 hours patient to be did go home with home care      3/26/2022, 11:45 AM  Marc Chong MD  [facility] Physician - [address], [place]    Subjective         Today, He is seen and Examined at Bedside today.   Patient is A & O x 3.  Case discussed with the nurse  Pertinent positives mentioned in review of system. All labs and imaging was reviewed, and appropriate steps were taken     Review of Systems   Constitutional: Negative for activity change, fatigue and fever. HENT: Negative for congestion and sore throat. Eyes: Negative for visual disturbance. Respiratory: Positive for shortness of breath. Negative for cough, chest tightness and wheezing. Cardiovascular: Negative for chest pain. Gastrointestinal: Positive for constipation. Negative for abdominal pain, diarrhea and nausea. Genitourinary: Negative for flank pain. Musculoskeletal: Negative for joint swelling. Left leg pain   Skin: Negative for rash. Neurological: Negative for seizures, speech difficulty and headaches. Objective    PHYSICAL EXAMINATION:  Blood pressure 136/83, pulse 75, temperature 97.8 °F (36.6 °C), temperature source Oral, resp. rate 18, height 5' 9\" (1.753 m), weight 235 lb (106.6 kg), SpO2 95 %. Physical Exam  Constitutional:       Appearance: He is ill-appearing. HENT:      Head: Normocephalic. Eyes:      Pupils: Pupils are equal, round, and reactive to light. Cardiovascular:      Rate and Rhythm: Normal rate. Heart sounds: Murmur heard. Pulmonary:      Effort: Pulmonary effort is normal.      Breath sounds: Normal breath sounds. Abdominal:      General: Bowel sounds are normal. There is no distension. Palpations: Abdomen is soft. Musculoskeletal:         General: No swelling. Cervical back: Neck supple. Comments: Left leg in brace   Skin:     Findings: No rash. Neurological:      Mental Status: He is alert and oriented to person, place, and time. Mental status is at baseline.            LABS and RADIOLOGY:  Today, I have reviewed laboratory and radiology results up to this point in the patients current encounter and have incorporated these into the assessment/plan and care of this patient. Recent Labs     03/24/22  0036 03/25/22  0521 03/26/22  0526   WBC 6.1 5.8 5.8   HGB 15.3 14.3 13.8   HCT 47.0 45.9 43.4    101* 102*     Recent Labs     03/24/22  1141 03/25/22  0521 03/26/22  0526    136 137   K 3.9 3.5 3.9    104 104   CO2 22 18* 16*   BUN 19 19 24*   CREATININE 1.5* 1.6* 1.8*   CALCIUM 8.3* 9.0 9.2     Recent Labs     03/24/22  0531 03/25/22  0521 03/26/22  0526   PROT 4.7* 6.5 6.9   ALKPHOS 98 142* 126   ALT 18 23 20   AST 24 25 22   BILITOT <0.2 0.7 0.6     Recent Labs     03/24/22  0036 03/25/22  0807 03/26/22  0526   INR 1.7 2.0 2.7     No results for input(s): Erle Keepers in the last 72 hours.   Chronic labs:  Lab Results   Component Value Date    CHOL 86 03/24/2022    TRIG 149 03/24/2022    HDL 28 03/24/2022    LDLCALC 28 03/24/2022    TSH 1.560 03/24/2022    INR 2.7 03/26/2022    LABA1C 5.2 03/24/2022       MEDICATIONS:   Infusion Medications    sodium chloride 75 mL/hr at 03/25/22 0854     Scheduled Medications    warfarin  5 mg Oral Once    sodium chloride flush  10 mL IntraVENous 2 times per day    metoprolol tartrate  25 mg Oral BID    aspirin  81 mg Oral Daily    atorvastatin  80 mg Oral Nightly    warfarin placeholder: dosing by pharmacy   Other RX Placeholder     PRN Meds: guaiFENesin-dextromethorphan, sodium chloride flush, promethazine **OR** ondansetron, polyethylene glycol, acetaminophen **OR** acetaminophen, morphine, potassium chloride, magnesium sulfate, oxyCODONE-acetaminophen **OR** oxyCODONE-acetaminophen, hydrALAZINE

## 2022-03-26 NOTE — PROGRESS NOTES
Physical Therapy    Treatment Note    Name: Brenda Soto  : 1965  MRN: [id number]      Date of Service: 3/26/2022    Evaluating PT:  Singh Martin, PT, DPT  [id number]     Room #:  7854/2788-A  Diagnosis:  Closed fracture of lateral portion of left tibial plateau, initial encounter [B86.122A]  Chest pain in adult [R07.9]  Chest pain, unspecified type [R07.9]  PMHx/PSHx:  None     Precautions:  Falls, NWB LLE, Hinge ROM brace LLE 0-30 deg  Equipment Needs:  FWW, Manual wheelchair     SUBJECTIVE:    Pt lives with roommates in a 2 story home with 7-8 stairs to enter and 1 rail. Bedroom and bathroom are on the 2nd level with full flight and 1 rail. Pt ambulated with no AD PTA. OBJECTIVE:   Initial Evaluation  Date: 3/25/22 Treatment Date: 3/26/22 Short Term/ Long Term   Goals   AM-PAC 6 Clicks 34/18     Was pt agreeable to Eval/treatment? Yes  Yes    Does pt have pain?  9/10 LLE 6/10 L LE pain    Bed Mobility  Rolling: SBA  Supine to sit: SBA  Sit to supine: NT  Scooting: SBA Rolling: NT  Supine to sit: NT  Sit to supine: NT  Scooting: NT Modified Independent     Transfers Sit to stand: SBA  Stand to sit: SBA  Stand pivot: Mod>Min A with FWW  Sit to stand: SBA  Stand to sit: SBA  Stand pivot: CGA with [employer] Modified Independent with FWW or B crutches    Ambulation    Few feet x3 reps with FWW Mod>Min A with repetition  10 feet forward/backward with WW with CGA (limited by fatigue) >25 feet with FWW or B crutches Modified Independent     Stair negotiation: ascended and descended  NT NT >2 steps with 2 rail Min A  Vs.  4 steps posterior approach Min A   ROM BUE:  Per OT eval  BLE:  WFL, LLE locked 0-30 deg flexion at knee NT    Strength BUE:  Per OT eval   RLE:  Grossly 5/5  LLE:  Grossly 3/5 within available planes  NT    Balance Sitting EOB:  Independent   Dynamic Standing:  Mod>min A Sitting EOB: NT  Dynamic Standing: SBA with [employer] Sitting EOB:  Independent   Dynamic Standing:  Modified Independent Pt is A & O x: 4 to person, place, month/year, and situation. Sensation: Pt denies numbness and tingling of extremities. Edema: Unremarkable. Patient education  Pt educated on NWB L LE and proper technique with [employer].    Patient response to education:   Pt verbalized understanding Pt demonstrated skill Pt requires further education in this area   Yes Yes Reinforce      ASSESSMENT:    Comments:    Pt was in chair upon room entry, agreeable to PT evaluation. Pt reports he has been sitting in chair since 0400 this morning. Pt reports he has not been sleeping well. Pt completed sit to stand to [employer] and ambulated forward/backward x2 reps. Pt is good at maintaining NWB but L LE barely cleared floor when hopping. Pt was mildly unsteady but did not have LOB. Pt was limited by fatigue and requested to sit following ambulation. Pt declined to attempt ambulation again due to fatigue. Pt wants to return home at discharge. This PT explained that pt would benefit from continued skilled PT services in the inpatient setting however pt wants to return home. Pt was instructed to scoot up stairs on buttocks instead of hopping if discharged home. All questions and concerns were addressed. Pt was left in chair with all needs met at conclusion of session. Charge nurse notified of need for [employer] in room. Treatment:  Patient practiced and was instructed in the following treatment:     Therapeutic activities:  o Transfers: Pt was cued for hand placement during sit <> stand transfers. o Ambulation: Pt was cued for technique with WW when ambulating forward/backward. Demonstration provided prior to ambulation. o Education: Pt was educated on stair transfer by scooting up on buttocks. PLAN:    Patient is making Fair progress towards established goals. Will continue with current POC.       Time in: 0740  Time out: 0800    Total Treatment Time 20 minutes     CPT codes:  [] Gait training 61477 0 minutes  [] Manual therapy 08985 0 minutes  [x] Therapeutic activities 70108 20 minutes  [] Therapeutic exercises 44978 0 minutes  [] Neuromuscular reeducation 59061 0 minutes      Vicenta Chand PT, DPT   [id number]

## 2022-03-26 NOTE — PROGRESS NOTES
Pharmacy Consultation Note  (Warfarin Dosing and Monitoring)  Initial consult date: 3/24/22  Consulting Provider: Salazar Byers. COREY Nails     Mt Blanchard is a 55 y.o. male for whom pharmacy has been asked to manage warfarin therapy. Weight:   Wt Readings from Last 1 Encounters:   03/24/22 235 lb (106.6 kg)       TSH:    Lab Results   Component Value Date    TSH 1.560 03/24/2022       Hepatic Function Panel:                            Lab Results   Component Value Date    ALKPHOS 126 03/26/2022    ALT 20 03/26/2022    AST 22 03/26/2022    PROT 6.9 03/26/2022    BILITOT 0.6 03/26/2022    LABALBU 3.5 03/26/2022       Current warfarin drug-drug interactions include: No significant interactions    Recent Labs     03/24/22  0036 03/25/22  0521 03/26/22  0526   HGB 15.3 14.3 13.8    101* 102*     Date Warfarin Dose INR Heparin or LMWH Comment   3/21-3/23 5 mg (outpatient)      3/24 7.5 mg 1.7 Heparin drip    3/25 7.5 mg 2 Heparin drip    3/26 5 mg 2.7 Heparin gtt                    Assessment:  · Patient is a 55 y.o. male on warfarin for mechanical valves x2: AVR (#23 [employer] bileaflet mechanical) and MVR (#29 [employer] bileaflet mechanical valve)  · Patient's home warfarin dosing regimen is: 2.5 mg on Saturday and Sunday; 5 mg on all other days. He had been off warfarin for a period of time and just re-started on 3/21/22. · Goal INR 2.5 - 3.5    · 3/24: INR 1.7 today. Patient states he had been off warfarin but re-started on 3/21. · 3/25: INR 2 today.    · 3/26: INR 2.7; therapeutic INR x 1    Plan:  · Warfarin 5 mg x 1 tonight  · Daily PT/INR until the INR is stable within the therapeutic range  · Pharmacist will follow and monitor/adjust dosing as necessary    Thank you for this consult,    Trudi Goldsmith, PharmD  Pharmacy Resident  P: [id number]  3/26/2022 8:51 AM

## 2022-03-27 LAB
ALBUMIN SERPL-MCNC: 4.1 G/DL (ref 3.5–5.2)
ALP BLD-CCNC: 156 U/L (ref 40–129)
ALT SERPL-CCNC: 20 U/L (ref 0–40)
ANION GAP SERPL CALCULATED.3IONS-SCNC: 13 MMOL/L (ref 7–16)
AST SERPL-CCNC: 26 U/L (ref 0–39)
BILIRUB SERPL-MCNC: 0.5 MG/DL (ref 0–1.2)
BUN BLDV-MCNC: 30 MG/DL (ref 6–20)
CALCIUM SERPL-MCNC: 9.4 MG/DL (ref 8.6–10.2)
CHLORIDE BLD-SCNC: 105 MMOL/L (ref 98–107)
CO2: 20 MMOL/L (ref 22–29)
CREAT SERPL-MCNC: 1.9 MG/DL (ref 0.7–1.2)
GFR AFRICAN AMERICAN: 45
GFR NON-AFRICAN AMERICAN: 37 ML/MIN/1.73
GLUCOSE BLD-MCNC: 88 MG/DL (ref 74–99)
HCT VFR BLD CALC: 48.1 % (ref 37–54)
HEMOGLOBIN: 15 G/DL (ref 12.5–16.5)
INR BLD: 4.8
INR BLD: 5.8
MCH RBC QN AUTO: 30.9 PG (ref 26–35)
MCHC RBC AUTO-ENTMCNC: 31.2 % (ref 32–34.5)
MCV RBC AUTO: 99 FL (ref 80–99.9)
PDW BLD-RTO: 17.2 FL (ref 11.5–15)
PLATELET # BLD: 118 E9/L (ref 130–450)
PMV BLD AUTO: 12.4 FL (ref 7–12)
POTASSIUM REFLEX MAGNESIUM: 4.2 MMOL/L (ref 3.5–5)
PROTHROMBIN TIME: 52.3 SEC (ref 9.3–12.4)
PROTHROMBIN TIME: 63.2 SEC (ref 9.3–12.4)
RBC # BLD: 4.86 E12/L (ref 3.8–5.8)
SODIUM BLD-SCNC: 138 MMOL/L (ref 132–146)
TOTAL PROTEIN: 7.4 G/DL (ref 6.4–8.3)
WBC # BLD: 5.4 E9/L (ref 4.5–11.5)

## 2022-03-27 PROCEDURE — 6360000002 HC RX W HCPCS: Performed by: INTERNAL MEDICINE

## 2022-03-27 PROCEDURE — 36415 COLL VENOUS BLD VENIPUNCTURE: CPT

## 2022-03-27 PROCEDURE — 6370000000 HC RX 637 (ALT 250 FOR IP): Performed by: FAMILY MEDICINE

## 2022-03-27 PROCEDURE — 2580000003 HC RX 258: Performed by: FAMILY MEDICINE

## 2022-03-27 PROCEDURE — 6370000000 HC RX 637 (ALT 250 FOR IP): Performed by: NURSE PRACTITIONER

## 2022-03-27 PROCEDURE — 85610 PROTHROMBIN TIME: CPT

## 2022-03-27 PROCEDURE — 85027 COMPLETE CBC AUTOMATED: CPT

## 2022-03-27 PROCEDURE — 6370000000 HC RX 637 (ALT 250 FOR IP): Performed by: INTERNAL MEDICINE

## 2022-03-27 PROCEDURE — 2140000000 HC CCU INTERMEDIATE R&B

## 2022-03-27 PROCEDURE — 80053 COMPREHEN METABOLIC PANEL: CPT

## 2022-03-27 RX ORDER — WARFARIN SODIUM 5 MG/1
5 TABLET ORAL
Qty: 30 TABLET | Refills: 3 | Status: ON HOLD
Start: 2022-04-02 | End: 2022-05-18 | Stop reason: HOSPADM

## 2022-03-27 RX ORDER — PHYTONADIONE 5 MG/1
5 TABLET ORAL ONCE
Status: COMPLETED | OUTPATIENT
Start: 2022-03-27 | End: 2022-03-27

## 2022-03-27 RX ORDER — WARFARIN SODIUM 2.5 MG/1
2.5 TABLET ORAL DAILY
Qty: 30 TABLET | Refills: 3 | Status: ON HOLD
Start: 2022-03-29 | End: 2022-05-18 | Stop reason: SDUPTHER

## 2022-03-27 RX ORDER — ASPIRIN 81 MG/1
81 TABLET ORAL DAILY
Qty: 30 TABLET | Refills: 3 | Status: SHIPPED | OUTPATIENT
Start: 2022-03-27 | End: 2022-08-22

## 2022-03-27 RX ORDER — OXYCODONE HYDROCHLORIDE AND ACETAMINOPHEN 5; 325 MG/1; MG/1
1 TABLET ORAL EVERY 4 HOURS PRN
Qty: 42 TABLET | Refills: 0 | Status: SHIPPED | OUTPATIENT
Start: 2022-03-27 | End: 2022-04-03

## 2022-03-27 RX ADMIN — PHYTONADIONE 5 MG: 5 TABLET ORAL at 22:31

## 2022-03-27 RX ADMIN — OXYCODONE AND ACETAMINOPHEN 2 TABLET: 5; 325 TABLET ORAL at 17:36

## 2022-03-27 RX ADMIN — Medication 10 ML: at 01:02

## 2022-03-27 RX ADMIN — OXYCODONE AND ACETAMINOPHEN 2 TABLET: 5; 325 TABLET ORAL at 21:44

## 2022-03-27 RX ADMIN — OXYCODONE AND ACETAMINOPHEN 2 TABLET: 5; 325 TABLET ORAL at 09:59

## 2022-03-27 RX ADMIN — METOPROLOL TARTRATE 25 MG: 25 TABLET, FILM COATED ORAL at 21:42

## 2022-03-27 RX ADMIN — ASPIRIN 81 MG: 81 TABLET, COATED ORAL at 09:59

## 2022-03-27 RX ADMIN — MORPHINE SULFATE 1 MG: 2 INJECTION, SOLUTION INTRAMUSCULAR; INTRAVENOUS at 06:12

## 2022-03-27 RX ADMIN — OXYCODONE AND ACETAMINOPHEN 2 TABLET: 5; 325 TABLET ORAL at 03:30

## 2022-03-27 RX ADMIN — METOPROLOL TARTRATE 25 MG: 25 TABLET, FILM COATED ORAL at 09:59

## 2022-03-27 RX ADMIN — OXYCODONE AND ACETAMINOPHEN 2 TABLET: 5; 325 TABLET ORAL at 13:52

## 2022-03-27 RX ADMIN — ATORVASTATIN CALCIUM 80 MG: 40 TABLET, FILM COATED ORAL at 21:42

## 2022-03-27 RX ADMIN — Medication 10 ML: at 09:58

## 2022-03-27 ASSESSMENT — ENCOUNTER SYMPTOMS
SHORTNESS OF BREATH: 0
CHEST TIGHTNESS: 0
DIARRHEA: 0
WHEEZING: 0
SORE THROAT: 0
ABDOMINAL PAIN: 0
COUGH: 0
CONSTIPATION: 0
NAUSEA: 0

## 2022-03-27 ASSESSMENT — PAIN SCALES - GENERAL
PAINLEVEL_OUTOF10: 9
PAINLEVEL_OUTOF10: 2
PAINLEVEL_OUTOF10: 6
PAINLEVEL_OUTOF10: 6
PAINLEVEL_OUTOF10: 8
PAINLEVEL_OUTOF10: 8
PAINLEVEL_OUTOF10: 2
PAINLEVEL_OUTOF10: 9
PAINLEVEL_OUTOF10: 9
PAINLEVEL_OUTOF10: 6
PAINLEVEL_OUTOF10: 8
PAINLEVEL_OUTOF10: 8

## 2022-03-27 ASSESSMENT — PAIN DESCRIPTION - LOCATION: LOCATION: LEG

## 2022-03-27 ASSESSMENT — PAIN DESCRIPTION - ORIENTATION: ORIENTATION: LEFT

## 2022-03-27 ASSESSMENT — PAIN DESCRIPTION - PAIN TYPE: TYPE: ACUTE PAIN

## 2022-03-27 NOTE — PROGRESS NOTES
Pharmacy Consultation Note  (Warfarin Dosing and Monitoring)  Initial consult date: 3/24/22  Consulting Provider: Courtney Vega. kimmie Severe, APN     Angelica Marie is a 55 y.o. male for whom pharmacy has been asked to manage warfarin therapy. Weight:   Wt Readings from Last 1 Encounters:   03/24/22 235 lb (106.6 kg)       TSH:    Lab Results   Component Value Date    TSH 1.560 03/24/2022       Hepatic Function Panel:                            Lab Results   Component Value Date    ALKPHOS 156 03/27/2022    ALT 20 03/27/2022    AST 26 03/27/2022    PROT 7.4 03/27/2022    BILITOT 0.5 03/27/2022    LABALBU 4.1 03/27/2022       Current warfarin drug-drug interactions include: No significant interactions    Recent Labs     03/25/22  0521 03/26/22  0526 03/27/22  0531   HGB 14.3 13.8 15.0   * 102* 118*     Date Warfarin Dose INR Heparin or LMWH Comment   3/21-3/23 5 mg (outpatient)      3/24 7.5 mg 1.7 Heparin drip    3/25 7.5 mg 2 Heparin drip    3/26 5 mg 2.7 Heparin gtt    3/27 HOLD 4.8 -- Heparin gtt stopped            Assessment:  · Patient is a 55 y.o. male on warfarin for mechanical valves x2: AVR (#23 [employer] bileaflet mechanical) and MVR (#29 [employer] bileaflet mechanical valve)  · Patient's home warfarin dosing regimen is: 2.5 mg on Saturday and Sunday; 5 mg on all other days. He had been off warfarin for a period of time and just re-started on 3/21/22. · Goal INR 2.5 - 3.5    · 3/24: INR 1.7 today. Patient states he had been off warfarin but re-started on 3/21. · 3/25: INR 2 today.    · 3/26: INR 2.7; therapeutic INR x 1  · 3/27: INR 4.8; heparin gtt stopped; H/H stable    Plan:  · NO warfarin tonight  · Daily PT/INR until the INR is stable within the therapeutic range  · Pharmacist will follow and monitor/adjust dosing as necessary    Thank you for this consult,    Maximiliano Batista, PharmD  Pharmacy Resident  P: [id number]  3/27/2022 8:38 AM

## 2022-03-27 NOTE — PLAN OF CARE
Instructions provided to patient to hold his Coumadin on 3/27, 3/28 and follow-up with primary care physician to get an INR checked within the next 24 to 48 hours.   Patient to continue holding Coumadin goal INR has to be between 2.5-3.5 at this point patient INR is 4.8 however no active signs of bleeding hence patient is safe to go home and follow-up with primary care physician nursing staff instructed to provide clear instructions on holding Coumadin until patient gets his INR checked as soon as possible with his primary care physician and further dose of Coumadin can be adjusted based on PCP recommendation or as  directed on AVS

## 2022-03-27 NOTE — PROGRESS NOTES
Dr. Brian Ramsay aware of INR 4.8. Stated he is discharging patient due to no active signs of bleeding. Dr. Brian Ramsay wants patient to follow up with PCP and get lab work drawn for INR level. Instructed patient to hold coumadin 3/27 and 3/28 per doctors request. Will educate patient at discharge. Patient ride unable to get here until about 6pm tonight.

## 2022-03-27 NOTE — PROGRESS NOTES
INR trending up. Patient explained that discharge needs to cancelled. Patient furious however ford understand the need to stay. INR jumped form high 2s to 4.8 we were expecting INR to trend down with holding Coumadin however an up trend makes Discharge unsafe , outpatient INR check difficult for patient as well since girl friend works . He has agreed to stay. Vitamin k oral ordered since patient has a fracture as well that can be sourc e of bleed.

## 2022-03-27 NOTE — DISCHARGE SUMMARY
Hospitalist Discharge Summary    Patient ID:  Anna Mcdonald  [id number]  14 y.o.  1965    Admit Date: 3/24/2022   Admitting Physician: Hamilton Lawler DO  Discharge Date:  3/27/2022   Discharge Physician: Betty Sanchez MD   Discharge Condition: Stable  Discharge Disposition: Home with [facility]  Admission Diagnoses:   Patient Active Problem List   Diagnosis    Chest pain     Discharge Diagnoses: Principal Problem:    Chest pain  Resolved Problems:    * No resolved hospital problems. *    Code Status:  Full Code  Condition:  Stable   Discharge Diet: Diet:  ADULT DIET; Regular    Consults: IP CONSULT TO ORTHOPEDIC SURGERY  IP CONSULT TO INTERNAL MEDICINE  IP CONSULT TO CARDIOLOGY  IP CONSULT TO OTOLARYNGOLOGY  INPATIENT CONSULT TO ORTHOTIST/PROSTHETIST  IP CONSULT TO PHARMACY  IP CONSULT TO [address] course  · 29-year-old male with past medical history of CAD s/p CABG, mechanical AVR/MVR, complete heart block s/p dual chamber pacemaker placement, testicular carcinoma s/p chemoradiation therapy in 1980s, hypertension, CVA in 2015, depression, was admitted on 3/24/2022 after a mechanical fall resulted in left tibial plateau fracture.  Patient was seen orthopedic surgery, recommended conservative management, no surgical intervention.  Patient was seen by cardiology. Breanne Perfect was out of all his medication for about 1 month including Coumadin.  INR subtherapeutic at the time of admission.  Started on IV heparin drip with Coumadin. INR became therapeutic on 3/26, follow-up INR was noted to be elevated 4.8 no active bleeding noted at this time patient is recommended to hold Coumadin on 3/27, 3/28, patient recommended to follow-up with Dr. Julio Gil  get INR checked on 3/29 and resume his Coumadin then     1. S/p left tibial fracture  2. Mechanical heart valve aortic and mitral  3. Anticoagulation monitoring  4. Coronary Artery Disease History of cabg  5.  Complete heart block history of pacemaker  6. Hypertension  7. History of CVA  8. Elevated troponin ACS ruled out     Patient seen by orthopedic recommended conservative management outpatient follow-up with Dr. Lorelei Steven  In regards to mechanical heart valve patient on IV heparin with Coumadin bridge INR is 4.8  Today. We will hold Coumadin for 2 days and outpatient follow-up with PCP to get INR checked on 3/29 and resume his regimen of Coumadin. Patient given strict instructions not to miss his appointment or take Coumadin without getting his INR checked  Outpatient follow-up with cardio Follow up with Dr Starr Valdes in office as scheduled April 43,[id number] @ 2:00 pm expected discharge within 24 hours  Lower extremity pain is better controlled continue patient on Percocet upon discharge will need refills from Dr. Lisa Grande office or from primary care physician patient given 7-day supply    Discharge Medications:   Current Discharge Medication List      START taking these medications    Details   oxyCODONE-acetaminophen (PERCOCET) 5-325 MG per tablet Take 1 tablet by mouth every 4 hours as needed for Pain for up to 7 days. Qty: 42 tablet, Refills: 0    Comments: Reduce doses taken as pain becomes manageable  Associated Diagnoses: Closed fracture of lateral portion of left tibial plateau, initial encounter      aspirin 81 MG EC tablet Take 1 tablet by mouth daily  Qty: 30 tablet, Refills: 3           Current Discharge Medication List      CONTINUE these medications which have CHANGED    Details   !! warfarin (COUMADIN) 2.5 MG tablet Take 1 tablet by mouth daily Monday through Friday   No Dose on 3/27/22  Qty: 30 tablet, Refills: 3      !! warfarin (COUMADIN) 5 MG tablet Take 1 tablet by mouth twice a week Saturday AND Sunday  Qty: 30 tablet, Refills: 3       !! - Potential duplicate medications found. Please discuss with provider.         Current Discharge Medication List      CONTINUE these medications which have NOT CHANGED    Details metoprolol succinate (TOPROL XL) 25 MG extended release tablet Take 25 mg by mouth daily       ARIPiprazole (ABILIFY) 30 MG tablet Take 30 mg by mouth daily      furosemide (LASIX) 20 MG tablet Take 20 mg by mouth daily      rosuvastatin (CRESTOR) 40 MG tablet Take 40 mg by mouth daily           Current Discharge Medication List                Assessment on Discharge: Stable, improved       Discharge Exam:  /81   Pulse 84   Temp 98.2 °F (36.8 °C) (Temporal)   Resp 18   Ht 5' 9\" (1.753 m)   Wt 235 lb (106.6 kg)   SpO2 97%   BMI 34.70 kg/m²   Physical Exam  Constitutional:       General: He is not in acute distress. Appearance: He is not ill-appearing. HENT:      Head: Normocephalic. Eyes:      Pupils: Pupils are equal, round, and reactive to light. Cardiovascular:      Rate and Rhythm: Normal rate. Heart sounds: Murmur heard. Pulmonary:      Effort: Pulmonary effort is normal.      Breath sounds: Normal breath sounds. Abdominal:      General: Bowel sounds are normal. There is no distension. Palpations: Abdomen is soft. Musculoskeletal:         General: No swelling. Cervical back: Neck supple. Comments: Left leg in brace   Skin:     Findings: No rash. Neurological:      Mental Status: He is alert and oriented to person, place, and time. Mental status is at baseline. Pertinent Studies During Hospital Stay:  Radiology:  XR KNEE LEFT (MIN 4 VIEWS)    Result Date: 3/24/2022  EXAMINATION: FOUR XRAY VIEWS OF THE LEFT KNEE 3/24/2022 1:18 am COMPARISON: None. HISTORY: ORDERING SYSTEM PROVIDED HISTORY: fall, Pain TECHNOLOGIST PROVIDED HISTORY: Reason for exam:->fall, Pain What reading provider will be dictating this exam?->CRC FINDINGS: Minimally displaced intra-articular fracture involving the left lateral tibial plateau. No evidence of knee dislocation.   Minimal displacement and step-off of the fracture fragments noted at the level of the lateral tibial plateau. The remainder of the osseous structures are normal in appearance. A moderate suprapatellar effusion is noted. No radiopaque foreign body. .     Mildly displaced intra-articular fracture involving the left lateral tibial plateau, with a knee joint effusion. No dislocation. XR TIBIA FIBULA LEFT (2 VIEWS)    Result Date: 3/24/2022  EXAMINATION: 3 XRAY VIEWS OF THE LEFT TIBIA AND FIBULA 3/24/2022 1:18 am COMPARISON: None. HISTORY: ORDERING SYSTEM PROVIDED HISTORY: fall, r/o fx TECHNOLOGIST PROVIDED HISTORY: Reason for exam:->fall, r/o fx What reading provider will be dictating this exam?->CRC FINDINGS: A minimally displaced fracture of the left lateral tibial plateau is noted, please refer to the knee radiograph report for further information. The knee and ankle joints are normally aligned. Remainder of the distal tibia and fibula are normal. A suprapatellar effusion is noted. No radiopaque foreign body or discrete soft tissue abnormalities identified. Fracture of the left lateral tibial plateau. No acute findings involving the remainder of the tibia or fibula. CT HEAD WO CONTRAST    Result Date: 3/24/2022  EXAMINATION: CT OF THE HEAD WITHOUT CONTRAST  3/24/2022 1:22 am TECHNIQUE: CT of the head was performed without the administration of intravenous contrast. Dose modulation, iterative reconstruction, and/or weight based adjustment of the mA/kV was utilized to reduce the radiation dose to as low as reasonably achievable. COMPARISON: None. HISTORY: ORDERING SYSTEM PROVIDED HISTORY: Trauma TECHNOLOGIST PROVIDED HISTORY: Has a \"code stroke\" or \"stroke alert\" been called? ->No Reason for exam:->Trauma Decision Support Exception - unselect if not a suspected or confirmed emergency medical condition->Emergency Medical Condition (MA) What reading provider will be dictating this exam?->CRC FINDINGS: BRAIN/VENTRICLES: No hemorrhage, mass effect, midline shift or edema.   No evidence of acute fracture. Large lipohemarthrosis. Edema and swelling along the expected course of the anterior cruciate ligament, which raises suspicion for ACL injury. XR CHEST PORTABLE    Result Date: 3/24/2022  EXAMINATION: ONE XRAY VIEW OF THE CHEST 3/24/2022 1:18 am COMPARISON: None. HISTORY: ORDERING SYSTEM PROVIDED HISTORY: chest pain TECHNOLOGIST PROVIDED HISTORY: Reason for exam:->chest pain What reading provider will be dictating this exam?->CRC FINDINGS: Moderate cardiomegaly. Mild prominence of the central pulmonary vasculature is noted. Minimal parenchymal and interstitial opacities are noted, most notably along the perihilar regions. No effusion or pneumothorax. Right chest wall pacemaker, with intracardiac leads overlying the right atrium and right ventricle. Median sternotomy wires and a prosthetic cardiac valve are noted. No pneumothorax. Cardiomegaly and findings suggesting mild interstitial edema.          Last Labs on Discharge:   Recent Results (from the past 24 hour(s))   Comprehensive Metabolic Panel w/ Reflex to MG    Collection Time: 03/27/22  5:31 AM   Result Value Ref Range    Sodium 138 132 - 146 mmol/L    Potassium reflex Magnesium 4.2 3.5 - 5.0 mmol/L    Chloride 105 98 - 107 mmol/L    CO2 20 (L) 22 - 29 mmol/L    Anion Gap 13 7 - 16 mmol/L    Glucose 88 74 - 99 mg/dL    BUN 30 (H) 6 - 20 mg/dL    CREATININE 1.9 (H) 0.7 - 1.2 mg/dL    GFR Non-African American 37 >=60 mL/min/1.73    GFR African American 45     Calcium 9.4 8.6 - 10.2 mg/dL    Total Protein 7.4 6.4 - 8.3 g/dL    Albumin 4.1 3.5 - 5.2 g/dL    Total Bilirubin 0.5 0.0 - 1.2 mg/dL    Alkaline Phosphatase 156 (H) 40 - 129 U/L    ALT 20 0 - 40 U/L    AST 26 0 - 39 U/L   Protime-INR    Collection Time: 03/27/22  5:31 AM   Result Value Ref Range    Protime 52.3 (H) 9.3 - 12.4 sec    INR 4.8    CBC    Collection Time: 03/27/22  5:31 AM   Result Value Ref Range    WBC 5.4 4.5 - 11.5 E9/L    RBC 4.86 3.80 - 5.80 E12/L    Hemoglobin 15.0 12.5 - 16.5 g/dL    Hematocrit 48.1 37.0 - 54.0 %    MCV 99.0 80.0 - 99.9 fL    MCH 30.9 26.0 - 35.0 pg    MCHC 31.2 (L) 32.0 - 34.5 %    RDW 17.2 (H) 11.5 - 15.0 fL    Platelets 546 (L) 007 - 450 E9/L    MPV 12.4 (H) 7.0 - 12.0 fL         Follow up: with No primary care provider on file. Discharge Instructions / Follow up:  Future Appointments   Date Time Provider Adair Good   4/19/2022  2:30 PM Natalie Beaulieu MD [facility]         Time Spent on discharge is more than 45 minutes in the examination, evaluation, counseling and review of medications and discharge plan.    +++++++++++++++++++++++++++++++++++++++++++++++++  Cynthia Prado MD  [address]  +++++++++++++++++++++++++++++++++++++++++++++++++    NOTE: This report was transcribed using voice recognition software. Every effort was made to ensure accuracy; however, inadvertent computerized transcription errors may be present.

## 2022-03-28 LAB
ALBUMIN SERPL-MCNC: 3.5 G/DL (ref 3.5–5.2)
ALP BLD-CCNC: 149 U/L (ref 40–129)
ALT SERPL-CCNC: 21 U/L (ref 0–40)
ANION GAP SERPL CALCULATED.3IONS-SCNC: 15 MMOL/L (ref 7–16)
AST SERPL-CCNC: 29 U/L (ref 0–39)
BILIRUB SERPL-MCNC: 0.4 MG/DL (ref 0–1.2)
BUN BLDV-MCNC: 28 MG/DL (ref 6–20)
CALCIUM SERPL-MCNC: 8.9 MG/DL (ref 8.6–10.2)
CHLORIDE BLD-SCNC: 106 MMOL/L (ref 98–107)
CO2: 15 MMOL/L (ref 22–29)
CREAT SERPL-MCNC: 1.8 MG/DL (ref 0.7–1.2)
GFR AFRICAN AMERICAN: 48
GFR NON-AFRICAN AMERICAN: 39 ML/MIN/1.73
GLUCOSE BLD-MCNC: 94 MG/DL (ref 74–99)
HCT VFR BLD CALC: 44.9 % (ref 37–54)
HEMOGLOBIN: 14.2 G/DL (ref 12.5–16.5)
INR BLD: 2.2
INR BLD: 5.7
MCH RBC QN AUTO: 31.5 PG (ref 26–35)
MCHC RBC AUTO-ENTMCNC: 31.6 % (ref 32–34.5)
MCV RBC AUTO: 99.6 FL (ref 80–99.9)
PDW BLD-RTO: 17.2 FL (ref 11.5–15)
PLATELET # BLD: 119 E9/L (ref 130–450)
PMV BLD AUTO: 12.2 FL (ref 7–12)
POTASSIUM REFLEX MAGNESIUM: 4.3 MMOL/L (ref 3.5–5)
PROTHROMBIN TIME: 24.1 SEC (ref 9.3–12.4)
PROTHROMBIN TIME: 61.7 SEC (ref 9.3–12.4)
RBC # BLD: 4.51 E12/L (ref 3.8–5.8)
SODIUM BLD-SCNC: 136 MMOL/L (ref 132–146)
TOTAL PROTEIN: 6.7 G/DL (ref 6.4–8.3)
WBC # BLD: 4.5 E9/L (ref 4.5–11.5)

## 2022-03-28 PROCEDURE — 36415 COLL VENOUS BLD VENIPUNCTURE: CPT

## 2022-03-28 PROCEDURE — 80053 COMPREHEN METABOLIC PANEL: CPT

## 2022-03-28 PROCEDURE — 85027 COMPLETE CBC AUTOMATED: CPT

## 2022-03-28 PROCEDURE — 6370000000 HC RX 637 (ALT 250 FOR IP): Performed by: STUDENT IN AN ORGANIZED HEALTH CARE EDUCATION/TRAINING PROGRAM

## 2022-03-28 PROCEDURE — 85610 PROTHROMBIN TIME: CPT

## 2022-03-28 PROCEDURE — 2580000003 HC RX 258: Performed by: FAMILY MEDICINE

## 2022-03-28 PROCEDURE — 6370000000 HC RX 637 (ALT 250 FOR IP): Performed by: NURSE PRACTITIONER

## 2022-03-28 PROCEDURE — 2140000000 HC CCU INTERMEDIATE R&B

## 2022-03-28 PROCEDURE — 6370000000 HC RX 637 (ALT 250 FOR IP): Performed by: FAMILY MEDICINE

## 2022-03-28 RX ORDER — TRAZODONE HYDROCHLORIDE 50 MG/1
50 TABLET ORAL NIGHTLY PRN
Status: DISCONTINUED | OUTPATIENT
Start: 2022-03-28 | End: 2022-03-30 | Stop reason: HOSPADM

## 2022-03-28 RX ORDER — PHYTONADIONE 5 MG/1
10 TABLET ORAL ONCE
Status: COMPLETED | OUTPATIENT
Start: 2022-03-28 | End: 2022-03-28

## 2022-03-28 RX ADMIN — OXYCODONE AND ACETAMINOPHEN 2 TABLET: 5; 325 TABLET ORAL at 03:04

## 2022-03-28 RX ADMIN — ASPIRIN 81 MG: 81 TABLET, COATED ORAL at 09:14

## 2022-03-28 RX ADMIN — METOPROLOL TARTRATE 25 MG: 25 TABLET, FILM COATED ORAL at 21:45

## 2022-03-28 RX ADMIN — METOPROLOL TARTRATE 25 MG: 25 TABLET, FILM COATED ORAL at 09:15

## 2022-03-28 RX ADMIN — OXYCODONE AND ACETAMINOPHEN 2 TABLET: 5; 325 TABLET ORAL at 18:03

## 2022-03-28 RX ADMIN — OXYCODONE AND ACETAMINOPHEN 2 TABLET: 5; 325 TABLET ORAL at 06:55

## 2022-03-28 RX ADMIN — TRAZODONE HYDROCHLORIDE 50 MG: 50 TABLET ORAL at 21:44

## 2022-03-28 RX ADMIN — PHYTONADIONE 10 MG: 5 TABLET ORAL at 12:55

## 2022-03-28 RX ADMIN — Medication 10 ML: at 21:45

## 2022-03-28 RX ADMIN — ATORVASTATIN CALCIUM 80 MG: 40 TABLET, FILM COATED ORAL at 21:45

## 2022-03-28 ASSESSMENT — PAIN SCALES - WONG BAKER
WONGBAKER_NUMERICALRESPONSE: 0
WONGBAKER_NUMERICALRESPONSE: 0

## 2022-03-28 ASSESSMENT — PAIN SCALES - GENERAL
PAINLEVEL_OUTOF10: 7
PAINLEVEL_OUTOF10: 2
PAINLEVEL_OUTOF10: 9
PAINLEVEL_OUTOF10: 0
PAINLEVEL_OUTOF10: 9
PAINLEVEL_OUTOF10: 0

## 2022-03-28 ASSESSMENT — PAIN DESCRIPTION - LOCATION: LOCATION: GENERALIZED

## 2022-03-28 ASSESSMENT — PAIN DESCRIPTION - PAIN TYPE: TYPE: ACUTE PAIN

## 2022-03-28 NOTE — PROGRESS NOTES
Pharmacy Consultation Note  (Warfarin Dosing and Monitoring)  Initial consult date: 3/24/22  Consulting Provider: Pacheco Han. COREY Torres     Vishal Grande is a 55 y.o. male for whom pharmacy has been asked to manage warfarin therapy. Weight:   Wt Readings from Last 1 Encounters:   03/24/22 235 lb (106.6 kg)       TSH:    Lab Results   Component Value Date    TSH 1.560 03/24/2022       Hepatic Function Panel:                            Lab Results   Component Value Date    ALKPHOS 156 03/27/2022    ALT 20 03/27/2022    AST 26 03/27/2022    PROT 7.4 03/27/2022    BILITOT 0.5 03/27/2022    LABALBU 4.1 03/27/2022       Current warfarin drug-drug interactions include: No significant interactions    Recent Labs     03/26/22  0526 03/27/22  0531 03/28/22  0521   HGB 13.8 15.0 14.2   * 118* 119*     Date Warfarin Dose INR Heparin or LMWH Comment   3/21-3/23 5 mg (outpatient)      3/24 7.5 mg 1.7 Heparin drip    3/25 7.5 mg 2 Heparin drip    3/26 5 mg 2.7 Heparin gtt    3/27 HOLD 4.8 -- Heparin gtt stopped  Vitamin K 5 mg PO x1 given   3/28 HOLD 5.7  Vitamin K 10 mg PO x1 given     Assessment:  · Patient is a 55 y.o. male on warfarin for mechanical valves x2: AVR (#23 [employer] bileaflet mechanical) and MVR (#29 [employer] bileaflet mechanical valve)  · Patient's home warfarin dosing regimen is: 2.5 mg on Saturday and Sunday; 5 mg on all other days. He had been off warfarin for a period of time and just re-started on 3/21/22. · Goal INR 2.5 - 3.5    · 3/24: INR 1.7 today. Patient states he had been off warfarin but re-started on 3/21. · 3/25: INR 2 today.    · 3/26: INR 2.7; therapeutic INR x 1  · 3/27: INR 4.8; heparin gtt stopped; H/H stable, vitamin K 5 mg PO x1 given  · 3/28: INR 5.7; vitamin K 10 mg PO x1 given    Plan:  · HOLD warfarin tonight  · Daily PT/INR until the INR is stable within the therapeutic range  · Pharmacist will follow and monitor/adjust dosing as necessary    Thank you for this consult,    Sherren Skelton.  Phi Ortiz, PharmD 3/28/2022 7:58 AM

## 2022-03-28 NOTE — PLAN OF CARE
Problem: Falls - Risk of:  Goal: Will remain free from falls  Description: Will remain free from falls  Outcome: Met This Shift  Goal: Absence of physical injury  Description: Absence of physical injury  Outcome: Met This Shift     Problem: Pain:  Goal: Control of chronic pain  Description: Control of chronic pain  Outcome: Met This Shift     Problem: Skin Integrity:  Goal: Will show no infection signs and symptoms  Description: Will show no infection signs and symptoms  Outcome: Met This Shift  Goal: Absence of new skin breakdown  Description: Absence of new skin breakdown  Outcome: Met This Shift     Problem: Pain:  Goal: Pain level will decrease  Description: Pain level will decrease  Outcome: Ongoing  Goal: Control of acute pain  Description: Control of acute pain  Outcome: Ongoing

## 2022-03-28 NOTE — PROGRESS NOTES
Physical Therapy  Facility/Department: [facility]  Daily Treatment Note  NAME: Dorys Arreola  : 1965  MRN: [id number]  No physical therapy this date per nursing due to increased INR. Will attempt again at later date. Continue with POC.     License [id number]  Fabrizio Carter PTA

## 2022-03-28 NOTE — PROGRESS NOTES
Hospitalist Progress Note      SYNOPSIS: Patient admitted on 3/24/2022      3 78-year-old male with past medical history of CAD s/p CABG, mechanical AVR/MVR, complete heart block s/p dual chamber pacemaker placement, testicular carcinoma s/p chemoradiation therapy in , hypertension, CVA in , depression, was admitted on 3/24/2022 after a mechanical fall resulted in left tibial plateau fracture.  Patient was seen orthopedic surgery, recommended conservative management, no surgical intervention.  Patient was seen by cardiology. Giacomo Kenney was out of all his medication for about 1 month including Coumadin.  INR subtherapeutic at the time of admission.  Started on IV heparin drip with Coumadin.  INR became therapeutic on 3/26, follow-up INR was noted to be elevated 4.8 no active bleeding noted at this time patient is recommended to hold Coumadin on 3/27, 3/28, patient recommended to follow-up with Dr. Abhijit Olea. DC CANCELLED INR TREND UP    SUBJECTIVE:    Patient seen and examined  No complaints, wants to leave   Records reviewed. Stable overnight. No other overnight issues reported. Temp (24hrs), Av °F (36.7 °C), Min:97.8 °F (36.6 °C), Max:98.2 °F (36.8 °C)    DIET: ADULT DIET; Regular  CODE: Full Code    Intake/Output Summary (Last 24 hours) at 3/28/2022 09  Last data filed at 3/27/2022 1440  Gross per 24 hour   Intake 480 ml   Output 500 ml   Net -20 ml       OBJECTIVE:    /82   Pulse 85   Temp 97.8 °F (36.6 °C) (Temporal)   Resp 18   Ht 5' 9\" (1.753 m)   Wt 235 lb (106.6 kg)   SpO2 97%   BMI 34.70 kg/m²     General appearance: No apparent distress, appears stated age and cooperative. HEENT:  Conjunctivae/corneas clear. Neck: Supple. No jugular venous distention.    Respiratory: Clear to auscultation bilaterally, normal respiratory effort  Cardiovascular: Regular rate rhythm, normal S1-S2  Abdomen: Soft, nontender, nondistended  Musculoskeletal: No clubbing, cyanosis, no bilateral lower extremity edema. Brisk capillary refill. Skin:  No rashes  on visible skin  Neurologic: awake, alert and following commands     ASSESSMENT:    1. S/p left tibial fracture  2. Mechanical heart valve aortic and mitral  3. Anticoagulation monitoring  4. Coronary Artery Disease History of cabg  5. Complete heart block history of pacemaker  6. Hypertension  7. History of CVA  8. Elevated troponin ACS ruled out        PLAN:    · Patient seen by orthopedic recommended conservative management outpatient follow-up with Dr. Olive Trammell  · In regards to mechanical heart valve patient on IV heparin with Coumadin bridge INR is 4.8  Trend up Discharge cancelled >> Vitamin K Oral given again today 10 mg PO. Repeat INR  In afternoon.     ·   Outpatient follow-up with cardio Follow up with Dr Akash Herrera in office as scheduled April 26,0215 @ 2:00 pm expected discharge within 24 hours  · Lower extremity pain is better controlled continue patient on Percocet upon discharge will need refills from Dr. Bo Beaulieu office or from primary care physician patient given 7-day supply         DISPOSITION:     Medications:  REVIEWED DAILY    Infusion Medications   Scheduled Medications    sodium chloride flush  10 mL IntraVENous 2 times per day    metoprolol tartrate  25 mg Oral BID    aspirin  81 mg Oral Daily    atorvastatin  80 mg Oral Nightly    warfarin placeholder: dosing by pharmacy   Other RX Placeholder     PRN Meds: guaiFENesin-dextromethorphan, morphine, sodium chloride flush, promethazine **OR** ondansetron, polyethylene glycol, acetaminophen **OR** acetaminophen, potassium chloride, magnesium sulfate, oxyCODONE-acetaminophen **OR** oxyCODONE-acetaminophen, hydrALAZINE    Labs:     Recent Labs     03/26/22 0526 03/27/22 0531 03/28/22 0521   WBC 5.8 5.4 4.5   HGB 13.8 15.0 14.2   HCT 43.4 48.1 44.9   * 118* 119*       Recent Labs     03/26/22 0526 03/27/22 0531 03/28/22 0521    138 136   K 3.9 4.2 4.3  105 106   CO2 16* 20* 15*   BUN 24* 30* 28*   CREATININE 1.8* 1.9* 1.8*   CALCIUM 9.2 9.4 8.9       Recent Labs     03/26/22  0526 03/27/22  0531 03/28/22  0521   PROT 6.9 7.4 6.7   ALKPHOS 126 156* 149*   ALT 20 20 21   AST 22 26 29   BILITOT 0.6 0.5 0.4       Recent Labs     03/27/22  0531 03/27/22  1705 03/28/22  0521   INR 4.8 5.8* 5.7*       No results for input(s): Quang Sevilla in the last 72 hours. Chronic labs:    Lab Results   Component Value Date    CHOL 86 03/24/2022    TRIG 149 03/24/2022    HDL 28 03/24/2022    LDLCALC 28 03/24/2022    TSH 1.560 03/24/2022    INR 5.7 (HH) 03/28/2022    LABA1C 5.2 03/24/2022       Radiology: REVIEWED DAILY    +++++++++++++++++++++++++++++++++++++++++++++++++  Kailey Francois MD  [employer] - [address] [place], [place]  +++++++++++++++++++++++++++++++++++++++++++++++++  NOTE: This report was transcribed using voice recognition software. Every effort was made to ensure accuracy; however, inadvertent computerized transcription errors may be present.

## 2022-03-28 NOTE — PROGRESS NOTES
INTERNAL MEDICINE PROGRESS NOTE          CHIEF COMPLAINT    Chief Complaint   Patient presents with    Fall    Leg Pain     [address]  3 60-year-old male with past medical history of CAD s/p CABG, mechanical AVR/MVR, complete heart block s/p dual chamber pacemaker placement, testicular carcinoma s/p chemoradiation therapy in 1980s, hypertension, CVA in 2015, depression, was admitted on 3/24/2022 after a mechanical fall resulted in left tibial plateau fracture.  Patient was seen orthopedic surgery, recommended conservative management, no surgical intervention.  Patient was seen by cardiology. Rocío Byrnes was out of all his medication for about 1 month including Coumadin.  INR subtherapeutic at the time of admission.  Started on IV heparin drip with Coumadin.  INR became therapeutic on 3/26, follow-up INR was noted to be elevated 4.8 no active bleeding noted at this time patient is recommended to hold Coumadin on 3/27, 3/28, patient recommended to follow-up with Dr. Teressa Bray . DC CANCELLED INR TREND UP    1. S/p left tibial fracture  2. Mechanical heart valve aortic and mitral  3. Anticoagulation monitoring  4. Coronary Artery Disease History of cabg  5. Complete heart block history of pacemaker  6. Hypertension  7. History of CVA  8.  Elevated troponin ACS ruled out     · Patient seen by orthopedic recommended conservative management outpatient follow-up with Dr. Corrales  · In regards to mechanical heart valve patient on IV heparin with Coumadin bridge INR is 4.8  Trend up Discharge cancelled >> Vitamin K Oral given , follow up INR in A  INR   Date Value Ref Range Status   03/27/2022 5.8 ([place])  Final   03/27/2022 4.8  Final   03/26/2022 2.7  Final   ·   ·   Outpatient follow-up with cardio Follow up with Dr Trudi Contreras in office as scheduled April 38,4726 @ 2:00 pm expected discharge within 24 hours  · Lower extremity pain is better controlled continue patient on Percocet upon discharge will need refills from Dr. Uyen Michael office or from primary care physician patient given 7-day supply    Barrier to discharge : INR High    3/27/2022, 8:26 PM  Hollis Phoenix, MD  [address]    Subjective         Today, He is seen and Examined at Bedside today. Patient is A & O x 3. Case discussed with the nurse  Pertinent positives mentioned in review of system. All labs and imaging was reviewed, and appropriate steps were taken     Review of Systems   Constitutional: Negative for activity change, fatigue and fever. HENT: Negative for congestion and sore throat. Eyes: Negative for visual disturbance. Respiratory: Negative for cough, chest tightness, shortness of breath and wheezing. Cardiovascular: Negative for chest pain. Gastrointestinal: Negative for abdominal pain, constipation, diarrhea and nausea. Genitourinary: Negative for flank pain. Musculoskeletal: Negative for joint swelling. Left leg pain   Skin: Negative for rash. Neurological: Negative for seizures, speech difficulty and headaches. Objective    PHYSICAL EXAMINATION:  Blood pressure 128/81, pulse 84, temperature 98.2 °F (36.8 °C), temperature source Temporal, resp. rate 18, height 5' 9\" (1.753 m), weight 235 lb (106.6 kg), SpO2 97 %. Physical Exam  Constitutional:       General: He is not in acute distress. Appearance: He is not ill-appearing. HENT:      Head: Normocephalic. Eyes:      Pupils: Pupils are equal, round, and reactive to light. Cardiovascular:      Rate and Rhythm: Normal rate. Heart sounds: Murmur heard. Pulmonary:      Effort: Pulmonary effort is normal.      Breath sounds: Normal breath sounds. Abdominal:      General: Bowel sounds are normal. There is no distension. Palpations: Abdomen is soft. Musculoskeletal:         General: No swelling. Cervical back: Neck supple.       Comments: Left leg in brace   Skin:     Findings: No rash. Neurological:      Mental Status: He is alert and oriented to person, place, and time. Mental status is at baseline. LABS and RADIOLOGY:  Today, I have reviewed laboratory and radiology results up to this point in the patients current encounter and have incorporated these into the assessment/plan and care of this patient. Recent Labs     03/25/22 0521 03/26/22  0526 03/27/22  0531   WBC 5.8 5.8 5.4   HGB 14.3 13.8 15.0   HCT 45.9 43.4 48.1   * 102* 118*     Recent Labs     03/25/22 0521 03/26/22  0526 03/27/22  0531    137 138   K 3.5 3.9 4.2    104 105   CO2 18* 16* 20*   BUN 19 24* 30*   CREATININE 1.6* 1.8* 1.9*   CALCIUM 9.0 9.2 9.4     Recent Labs     03/25/22 0521 03/26/22  0526 03/27/22  0531   PROT 6.5 6.9 7.4   ALKPHOS 142* 126 156*   ALT 23 20 20   AST 25 22 26   BILITOT 0.7 0.6 0.5     Recent Labs     03/26/22  0526 03/27/22  0531 03/27/22  1705   INR 2.7 4.8 5.8*     No results for input(s): [employer] in the last 72 hours.   Chronic labs:  Lab Results   Component Value Date    CHOL 86 03/24/2022    TRIG 149 03/24/2022    HDL 28 03/24/2022    LDLCALC 28 03/24/2022    TSH 1.560 03/24/2022    INR 5.8 () 03/27/2022    LABA1C 5.2 03/24/2022       MEDICATIONS:   Infusion Medications   Scheduled Medications    phytonadione  5 mg Oral Once    sodium chloride flush  10 mL IntraVENous 2 times per day    metoprolol tartrate  25 mg Oral BID    aspirin  81 mg Oral Daily    atorvastatin  80 mg Oral Nightly    warfarin placeholder: dosing by pharmacy   Other RX Placeholder     PRN Meds: guaiFENesin-dextromethorphan, morphine, sodium chloride flush, promethazine **OR** ondansetron, polyethylene glycol, acetaminophen **OR** acetaminophen, potassium chloride, magnesium sulfate, oxyCODONE-acetaminophen **OR** oxyCODONE-acetaminophen, hydrALAZINE

## 2022-03-29 ENCOUNTER — APPOINTMENT (OUTPATIENT)
Dept: GENERAL RADIOLOGY | Age: 57
DRG: 342 | End: 2022-03-29
Payer: COMMERCIAL

## 2022-03-29 VITALS
DIASTOLIC BLOOD PRESSURE: 58 MMHG | TEMPERATURE: 97.5 F | BODY MASS INDEX: 34.8 KG/M2 | SYSTOLIC BLOOD PRESSURE: 106 MMHG | OXYGEN SATURATION: 100 % | RESPIRATION RATE: 18 BRPM | WEIGHT: 235 LBS | HEART RATE: 60 BPM | HEIGHT: 69 IN

## 2022-03-29 LAB
ALBUMIN SERPL-MCNC: 3.6 G/DL (ref 3.5–5.2)
ALP BLD-CCNC: 143 U/L (ref 40–129)
ALT SERPL-CCNC: 22 U/L (ref 0–40)
ANION GAP SERPL CALCULATED.3IONS-SCNC: 12 MMOL/L (ref 7–16)
AST SERPL-CCNC: 32 U/L (ref 0–39)
BILIRUB SERPL-MCNC: 0.6 MG/DL (ref 0–1.2)
BUN BLDV-MCNC: 28 MG/DL (ref 6–20)
CALCIUM SERPL-MCNC: 9 MG/DL (ref 8.6–10.2)
CHLORIDE BLD-SCNC: 105 MMOL/L (ref 98–107)
CO2: 20 MMOL/L (ref 22–29)
CREAT SERPL-MCNC: 1.7 MG/DL (ref 0.7–1.2)
GFR AFRICAN AMERICAN: 51
GFR NON-AFRICAN AMERICAN: 42 ML/MIN/1.73
GLUCOSE BLD-MCNC: 96 MG/DL (ref 74–99)
HCT VFR BLD CALC: 43.4 % (ref 37–54)
HEMOGLOBIN: 13.7 G/DL (ref 12.5–16.5)
INR BLD: 1.6
MCH RBC QN AUTO: 31.4 PG (ref 26–35)
MCHC RBC AUTO-ENTMCNC: 31.6 % (ref 32–34.5)
MCV RBC AUTO: 99.3 FL (ref 80–99.9)
PDW BLD-RTO: 17 FL (ref 11.5–15)
PLATELET # BLD: 118 E9/L (ref 130–450)
PMV BLD AUTO: 12.5 FL (ref 7–12)
POTASSIUM REFLEX MAGNESIUM: 4.4 MMOL/L (ref 3.5–5)
PROTHROMBIN TIME: 17.2 SEC (ref 9.3–12.4)
RBC # BLD: 4.37 E12/L (ref 3.8–5.8)
SODIUM BLD-SCNC: 137 MMOL/L (ref 132–146)
TOTAL PROTEIN: 7 G/DL (ref 6.4–8.3)
WBC # BLD: 4.2 E9/L (ref 4.5–11.5)

## 2022-03-29 PROCEDURE — 36415 COLL VENOUS BLD VENIPUNCTURE: CPT

## 2022-03-29 PROCEDURE — 6370000000 HC RX 637 (ALT 250 FOR IP): Performed by: NURSE PRACTITIONER

## 2022-03-29 PROCEDURE — 85610 PROTHROMBIN TIME: CPT

## 2022-03-29 PROCEDURE — 6370000000 HC RX 637 (ALT 250 FOR IP): Performed by: FAMILY MEDICINE

## 2022-03-29 PROCEDURE — 73562 X-RAY EXAM OF KNEE 3: CPT

## 2022-03-29 PROCEDURE — 80053 COMPREHEN METABOLIC PANEL: CPT

## 2022-03-29 PROCEDURE — 99222 1ST HOSP IP/OBS MODERATE 55: CPT | Performed by: OTOLARYNGOLOGY

## 2022-03-29 PROCEDURE — 85027 COMPLETE CBC AUTOMATED: CPT

## 2022-03-29 RX ORDER — WARFARIN SODIUM 2.5 MG/1
2.5 TABLET ORAL
Status: COMPLETED | OUTPATIENT
Start: 2022-03-29 | End: 2022-03-29

## 2022-03-29 RX ADMIN — OXYCODONE AND ACETAMINOPHEN 2 TABLET: 5; 325 TABLET ORAL at 08:17

## 2022-03-29 RX ADMIN — OXYCODONE AND ACETAMINOPHEN 2 TABLET: 5; 325 TABLET ORAL at 18:04

## 2022-03-29 RX ADMIN — OXYCODONE AND ACETAMINOPHEN 1 TABLET: 5; 325 TABLET ORAL at 03:53

## 2022-03-29 RX ADMIN — METOPROLOL TARTRATE 25 MG: 25 TABLET, FILM COATED ORAL at 08:17

## 2022-03-29 RX ADMIN — OXYCODONE AND ACETAMINOPHEN 2 TABLET: 5; 325 TABLET ORAL at 12:09

## 2022-03-29 RX ADMIN — OXYCODONE AND ACETAMINOPHEN 1 TABLET: 5; 325 TABLET ORAL at 00:09

## 2022-03-29 RX ADMIN — ASPIRIN 81 MG: 81 TABLET, COATED ORAL at 08:17

## 2022-03-29 RX ADMIN — WARFARIN SODIUM 2.5 MG: 2.5 TABLET ORAL at 12:09

## 2022-03-29 ASSESSMENT — PAIN DESCRIPTION - DESCRIPTORS
DESCRIPTORS: SHARP
DESCRIPTORS: SHARP

## 2022-03-29 ASSESSMENT — PAIN SCALES - GENERAL
PAINLEVEL_OUTOF10: 1
PAINLEVEL_OUTOF10: 7
PAINLEVEL_OUTOF10: 2
PAINLEVEL_OUTOF10: 0
PAINLEVEL_OUTOF10: 0
PAINLEVEL_OUTOF10: 6
PAINLEVEL_OUTOF10: 6
PAINLEVEL_OUTOF10: 7
PAINLEVEL_OUTOF10: 7
PAINLEVEL_OUTOF10: 0

## 2022-03-29 ASSESSMENT — PAIN DESCRIPTION - LOCATION
LOCATION: LEG
LOCATION: LEG
LOCATION: KNEE
LOCATION: KNEE

## 2022-03-29 ASSESSMENT — PAIN DESCRIPTION - ONSET
ONSET: GRADUAL
ONSET: GRADUAL

## 2022-03-29 ASSESSMENT — PAIN DESCRIPTION - ORIENTATION
ORIENTATION: LEFT

## 2022-03-29 ASSESSMENT — PAIN DESCRIPTION - PAIN TYPE
TYPE: ACUTE PAIN

## 2022-03-29 ASSESSMENT — PAIN - FUNCTIONAL ASSESSMENT
PAIN_FUNCTIONAL_ASSESSMENT: PREVENTS OR INTERFERES SOME ACTIVE ACTIVITIES AND ADLS
PAIN_FUNCTIONAL_ASSESSMENT: PREVENTS OR INTERFERES SOME ACTIVE ACTIVITIES AND ADLS

## 2022-03-29 ASSESSMENT — PAIN DESCRIPTION - PROGRESSION
CLINICAL_PROGRESSION: NOT CHANGED
CLINICAL_PROGRESSION: NOT CHANGED

## 2022-03-29 ASSESSMENT — PAIN DESCRIPTION - FREQUENCY
FREQUENCY: INTERMITTENT
FREQUENCY: INTERMITTENT

## 2022-03-29 ASSESSMENT — PAIN SCALES - WONG BAKER
WONGBAKER_NUMERICALRESPONSE: 0
WONGBAKER_NUMERICALRESPONSE: 0

## 2022-03-29 NOTE — PROGRESS NOTES
P Quality Flow/Interdisciplinary Rounds Progress Note        Quality Flow Rounds held on March 29, 2022    Disciplines Attending:  Bedside Nurse, ,  and Nursing Unit Leadership    Medina Whitney was admitted on 3/24/2022 12:25 AM    Anticipated Discharge Date:  Expected Discharge Date: 03/26/22    Disposition:    Jasvir Score:  Jasvir Scale Score: 23    Readmission Risk              Risk of Unplanned Readmission:  14           Discussed patient goal for the day, patient clinical progression, and barriers to discharge.   The following Goal(s) of the Day/Commitment(s) have been identified:  Patient discharging home today, p/u 1pm.    Radha Muñoz RN  March 29, 2022

## 2022-03-29 NOTE — CARE COORDINATION
Care Coordination: DC plan is home. Per nursing in rounds, awaiting room mate to . Will check to see if pt has key to home and [facility] .  Awaiting follow up    Ankush Banuelos

## 2022-03-29 NOTE — DISCHARGE SUMMARY
up:    Future Appointments   Date Time Provider Adair Good   4/19/2022  2:30 PM Matt Johnson MD [facility]       Continued appropriate risk factor modification of blood pressure, diabetes and serum lipids will remain essential to reducing risk of future atherosclerotic development    Activity: activity as tolerated    Significant labs:  CBC:   Recent Labs     03/27/22  0531 03/28/22  0521 03/29/22  0519   WBC 5.4 4.5 4.2*   RBC 4.86 4.51 4.37   HGB 15.0 14.2 13.7   HCT 48.1 44.9 43.4   MCV 99.0 99.6 99.3   RDW 17.2* 17.2* 17.0*   * 119* 118*     BMP:   Recent Labs     03/27/22  0531 03/28/22  0521 03/29/22  0519    136 137   K 4.2 4.3 4.4    106 105   CO2 20* 15* 20*   BUN 30* 28* 28*   CREATININE 1.9* 1.8* 1.7*     LFT:  Recent Labs     03/27/22  0531 03/28/22  0521 03/29/22  0519   PROT 7.4 6.7 7.0   ALKPHOS 156* 149* 143*   ALT 20 21 22   AST 26 29 32   BILITOT 0.5 0.4 0.6     PT/INR:   Recent Labs     03/28/22  0521 03/28/22  1841 03/29/22  0519   INR 5.7* 2.2 1.6     BNP: No results for input(s): BNP in the last 72 hours. Hgb A1C:   Lab Results   Component Value Date    LABA1C 5.2 03/24/2022     Folate and B12: No results found for: [id number], No results found for: FOLATE  Thyroid Studies:   Lab Results   Component Value Date    TSH 1.560 03/24/2022       Urinalysis:    Lab Results   Component Value Date    NITRU Negative 03/24/2022    WBCUA 0-1 03/24/2022    BACTERIA FEW 03/24/2022    RBCUA 2-5 03/24/2022    BLOODU MODERATE 03/24/2022    SPECGRAV 1.015 03/24/2022    GLUCOSEU Negative 03/24/2022       Imaging:  XR KNEE LEFT (MIN 4 VIEWS)    Result Date: 3/24/2022  EXAMINATION: FOUR XRAY VIEWS OF THE LEFT KNEE 3/24/2022 1:18 am COMPARISON: None.  HISTORY: ORDERING SYSTEM PROVIDED HISTORY: fall, Pain TECHNOLOGIST PROVIDED HISTORY: Reason for exam:->fall, Pain What reading provider will be dictating this exam?->CRC FINDINGS: Minimally displaced intra-articular fracture involving the left lateral tibial plateau. No evidence of knee dislocation. Minimal displacement and step-off of the fracture fragments noted at the level of the lateral tibial plateau. The remainder of the osseous structures are normal in appearance. A moderate suprapatellar effusion is noted. No radiopaque foreign body. .     Mildly displaced intra-articular fracture involving the left lateral tibial plateau, with a knee joint effusion. No dislocation. XR TIBIA FIBULA LEFT (2 VIEWS)    Result Date: 3/24/2022  EXAMINATION: 3 XRAY VIEWS OF THE LEFT TIBIA AND FIBULA 3/24/2022 1:18 am COMPARISON: None. HISTORY: ORDERING SYSTEM PROVIDED HISTORY: fall, r/o fx TECHNOLOGIST PROVIDED HISTORY: Reason for exam:->fall, r/o fx What reading provider will be dictating this exam?->CRC FINDINGS: A minimally displaced fracture of the left lateral tibial plateau is noted, please refer to the knee radiograph report for further information. The knee and ankle joints are normally aligned. Remainder of the distal tibia and fibula are normal. A suprapatellar effusion is noted. No radiopaque foreign body or discrete soft tissue abnormalities identified. Fracture of the left lateral tibial plateau. No acute findings involving the remainder of the tibia or fibula. CT HEAD WO CONTRAST    Result Date: 3/24/2022  EXAMINATION: CT OF THE HEAD WITHOUT CONTRAST  3/24/2022 1:22 am TECHNIQUE: CT of the head was performed without the administration of intravenous contrast. Dose modulation, iterative reconstruction, and/or weight based adjustment of the mA/kV was utilized to reduce the radiation dose to as low as reasonably achievable. COMPARISON: None. HISTORY: ORDERING SYSTEM PROVIDED HISTORY: Trauma TECHNOLOGIST PROVIDED HISTORY: Has a \"code stroke\" or \"stroke alert\" been called? ->No Reason for exam:->Trauma Decision Support Exception - unselect if not a suspected or confirmed emergency medical condition->Emergency Medical Condition ([place]) What reading provider will be dictating this exam?->CRC FINDINGS: BRAIN/VENTRICLES: No hemorrhage, mass effect, midline shift or edema. No evidence of acute transcortical infarction. Diffuse cortical atrophy, chronic microvascular ischemic changes are noted. Encephalomalacia within the right inferior frontal lobe, suggesting sequela of chronic process. ORBITS: The visualized portion of the orbits demonstrate no acute abnormality. SINUSES: Minimal disease involving the left anterior ethmoid air cells. Remainder of the visualized paranasal sinuses are clear. The mastoid air cells are well aerated. SOFT TISSUES/SKULL:  Bilateral nasal bone fractures are seen. No acute calvarial fractures are identified. No acute intracranial findings. Chronic changes as above. Bilateral nasal bone fractures. CT CERVICAL SPINE WO CONTRAST    Result Date: 3/24/2022  EXAMINATION: CT OF THE CERVICAL SPINE WITHOUT CONTRAST 3/24/2022 1:22 am TECHNIQUE: CT of the cervical spine was performed without the administration of intravenous contrast. Multiplanar reformatted images are provided for review. Dose modulation, iterative reconstruction, and/or weight based adjustment of the mA/kV was utilized to reduce the radiation dose to as low as reasonably achievable. COMPARISON: None. HISTORY: ORDERING SYSTEM PROVIDED HISTORY: fall TECHNOLOGIST PROVIDED HISTORY: Reason for exam:->fall Decision Support Exception - unselect if not a suspected or confirmed emergency medical condition->Emergency Medical Condition ([place]) What reading provider will be dictating this exam?->CRC FINDINGS: BONES/ALIGNMENT: Normal cervical lordosis is maintained. No fracture or dislocation. DEGENERATIVE CHANGES: Multilevel degenerative changes are noted, with disc space narrowing, spondylosis and facet hypertrophy. C2-3: No significant spinal or foraminal stenosis. C3-4: Moderate right and severe left foraminal stenosis. C4-5: Mild left foraminal stenosis.  C5-6: Mild to moderate left foraminal stenosis. C6-7: Mild to moderate bilateral foraminal stenosis. C7-T1: No significant spinal or foraminal stenosis. SOFT TISSUES: There is no prevertebral soft tissue swelling. A stent is seen within the right carotid artery. No acute findings. Multilevel degenerative changes. CT KNEE LEFT WO CONTRAST    Result Date: 3/24/2022  EXAMINATION: CT OF THE LEFT KNEE WITHOUT CONTRAST 3/24/2022 5:03 am TECHNIQUE: CT of the left knee was performed without the administration of intravenous contrast.  Multiplanar reformatted images are provided for review. Dose modulation, iterative reconstruction, and/or weight based adjustment of the mA/kV was utilized to reduce the radiation dose to as low as reasonably achievable. COMPARISON: March 24, 2022. HISTORY ORDERING SYSTEM PROVIDED HISTORY: fracture TECHNOLOGIST PROVIDED HISTORY: Reason for exam:->fracture What reading provider will be dictating this exam?->CRC FINDINGS: Bones: Patella: A small linear lucency is noted within the right paramedian ventral aspect of the patella, which may represent a nondisplaced fracture. The remainder of the patella is normal. Femur: No evidence of acute fracture. No lytic or blastic osseous lesions. Tibia: There is a comminuted, mildly impacted fracture involving the lateral tibial plateau, with approximately 3-4 mm of displacement of the distal fracture fragment, as well as mild offset of the fracture fragments resulting in mild depression of the tibial plateau. Fibula: No abnormalities. Joints: No evidence of knee joint dislocation. A large lipohemarthrosis is noted. Soft Tissue: Mild prepatellar soft tissue swelling. No foreign body. On the sagittal images, there is fullness and heterogeneity along the expected course of the anterior cruciate ligament. This raises suspicion for ACL injury. The PCL is normal in appearance.      Comminuted, minimally displaced fracture involving the lateral tibial plateau as detailed above. No evidence of knee dislocation. Subtle linear lucency within the ventral right paramedian patella, which may represent a nondisplaced fracture. Large lipohemarthrosis. Edema and swelling along the expected course of the anterior cruciate ligament, which raises suspicion for ACL injury. XR CHEST PORTABLE    Result Date: 3/24/2022  EXAMINATION: ONE XRAY VIEW OF THE CHEST 3/24/2022 1:18 am COMPARISON: None. HISTORY: ORDERING SYSTEM PROVIDED HISTORY: chest pain TECHNOLOGIST PROVIDED HISTORY: Reason for exam:->chest pain What reading provider will be dictating this exam?->CRC FINDINGS: Moderate cardiomegaly. Mild prominence of the central pulmonary vasculature is noted. Minimal parenchymal and interstitial opacities are noted, most notably along the perihilar regions. No effusion or pneumothorax. Right chest wall pacemaker, with intracardiac leads overlying the right atrium and right ventricle. Median sternotomy wires and a prosthetic cardiac valve are noted. No pneumothorax. Cardiomegaly and findings suggesting mild interstitial edema. Discharge Medications:      Medication List      START taking these medications    aspirin 81 MG EC tablet  Take 1 tablet by mouth daily     oxyCODONE-acetaminophen 5-325 MG per tablet  Commonly known as: PERCOCET  Take 1 tablet by mouth every 4 hours as needed for Pain for up to 7 days. CHANGE how you take these medications    * warfarin 2.5 MG tablet  Commonly known as: COUMADIN  Take 1 tablet by mouth daily Monday through Friday   No Dose on 3/27/22  What changed: additional instructions     * warfarin 5 MG tablet  Commonly known as: COUMADIN  Take 1 tablet by mouth twice a week Saturday AND Sunday  Start taking on: April 2, 2022  What changed:   · when to take this  · These instructions start on April 2, 2022. If you are unsure what to do until then, ask your doctor or other care provider.          * This list has 2 medication(s) that are the same as other medications prescribed for you. Read the directions carefully, and ask your doctor or other care provider to review them with you. CONTINUE taking these medications    ARIPiprazole 30 MG tablet  Commonly known as: ABILIFY     furosemide 20 MG tablet  Commonly known as: LASIX     metoprolol succinate 25 MG extended release tablet  Commonly known as: TOPROL XL     rosuvastatin 40 MG tablet  Commonly known as: CRESTOR           Where to Get Your Medications      These medications were sent to [address] [place] [address] [phone]  [address] [place] [address]    Phone: [phone]   · aspirin 81 MG EC tablet  · oxyCODONE-acetaminophen 5-325 MG per tablet     Information about where to get these medications is not yet available    Ask your nurse or doctor about these medications  · warfarin 2.5 MG tablet  · warfarin 5 MG tablet         Time Spent on discharge is more than 45 minutes in the examination, evaluation, counseling and review of medications and discharge plan.    +++++++++++++++++++++++++++++++++++++++++++++++++  Bishop Rachel MD  [address]  +++++++++++++++++++++++++++++++++++++++++++++++++  NOTE: This report was transcribed using voice recognition software. Every effort was made to ensure accuracy; however, inadvertent computerized transcription errors may be present.

## 2022-03-29 NOTE — PROGRESS NOTES
Department of Orthopedic Surgery  Resident Progress Note    Patient seen and examined. Pain controlled mild soreness to knee. States he put some weight and walked on it today. No new complaints. Denies chest pain, shortness of breath, dizziness/lightheadedness. Denies N/T/P. Currently ongoing medical management for elevated troponin. On warfarin. VITALS:  /77   Pulse 69   Temp 97.5 °F (36.4 °C)   Resp 20   Ht 5' 9\" (1.753 m)   Wt 235 lb (106.6 kg)   SpO2 99%   BMI 34.70 kg/m²     General: alert and oriented to person, place and time, well-developed and well-nourished, in no acute distress    MUSCULOSKELETAL:   left lower extremity:  · Hinged ROM in brace, 0-30  · Mild swelling to left knee  · Compartments soft and compressible  · +PF/DF/EHL  · +2/4 DP & PT pulses, Brisk Cap refill, Toes warm and perfused  · Distal sensation grossly intact to Peroneals, Sural, Saphenous, and tibial nrs    CBC:   Lab Results   Component Value Date    WBC 4.2 03/29/2022    HGB 13.7 03/29/2022    HCT 43.4 03/29/2022     03/29/2022     PT/INR:    Lab Results   Component Value Date    PROTIME 17.2 03/29/2022    INR 1.6 03/29/2022        ASSESSMENT  · JESUS Troncoso 3 tibial plateau fracture    PLAN      · Continue physical therapy and protocol: NWB - LLE hinged ROM brace 0-30. Discussed w/ pt. Importance of adhering to weight bearing restrictions. Early weight bearing at this stage could lead to potential ORIF. Pt. States understanding  · Discussed smoking cessation with patient. He states at this time he is not willing to quit. Did discuss with him the increased risk of complications, including non-union.  States understanding    · Ordered new xrays left knee, will review when available  · Deep venous thrombosis prophylaxis - per primary, early mobilization  · No acute orthopedic intervention planned  · PT/OT  · Pain Control: IV and PO  · Follow with Dr. Maranda Wan as an out patient 2 weeks following d/c  · D/C Plan: Per primary, OK for DC from an orthopedic standpoint once medical management is complete and appropriate dc plan is in place

## 2022-03-29 NOTE — PROGRESS NOTES
Pharmacy Consultation Note  (Warfarin Dosing and Monitoring)  Initial consult date: 3/24/22  Consulting Provider: Pacheco Han. COREY Torres     Vishal Grande is a 55 y.o. male for whom pharmacy has been asked to manage warfarin therapy. Weight:   Wt Readings from Last 1 Encounters:   03/24/22 235 lb (106.6 kg)     TSH:    Lab Results   Component Value Date    TSH 1.560 03/24/2022     Hepatic Function Panel:              Lab Results   Component Value Date    ALKPHOS 149 03/28/2022    ALT 21 03/28/2022    AST 29 03/28/2022    PROT 6.7 03/28/2022    BILITOT 0.4 03/28/2022    LABALBU 3.5 03/28/2022       Current warfarin drug-drug interactions include: No significant interactions    Recent Labs     03/27/22  0531 03/28/22  0521 03/29/22  0519   HGB 15.0 14.2 13.7   * 119* 118*     Date Warfarin Dose INR Heparin or LMWH Comment   3/21-3/23 5 mg (outpatient)      3/24 7.5 mg 1.7 Heparin drip    3/25 7.5 mg 2 Heparin drip    3/26 5 mg 2.7 Heparin gtt    3/27 HOLD 4.8  5.8 -- Heparin gtt stopped  Vitamin K 5 mg PO x1 given   3/28 HOLD 5.7  2.2  Vitamin K 10 mg PO x1 given   3/29 2.5 mg 1.6       Assessment:  · Patient is a 55 y.o. male on warfarin for mechanical valves x2: AVR (#23 [employer] bileaflet mechanical) and MVR (#29 [employer] bileaflet mechanical valve)  · Patient's home warfarin dosing regimen is: 2.5 mg on Saturday and Sunday; 5 mg on all other days. He had been off warfarin for a period of time and just re-started on 3/21/22. · Goal INR 2.5 - 3.5    · 3/24: INR 1.7 today. Patient states he had been off warfarin but re-started on 3/21. · 3/25: INR 2 today.    · 3/26: INR 2.7; therapeutic INR x 1  · 3/27: INR 4.8; heparin gtt stopped; H/H stable, vitamin K 5 mg PO x1 given  · 3/28: INR 5.7; vitamin K 10 mg PO x1 given  · 3/29: INR 1.6    Plan:  · Give warfarin 2.5 mg x1 today  · Daily PT/INR until the INR is stable within the therapeutic range  · Pharmacist will follow and monitor/adjust dosing as necessary    Thank you for this consult,    Catalina Merino.  Jigna Brown, PharmD 3/29/2022 7:14 AM

## 2022-04-05 ENCOUNTER — APPOINTMENT (OUTPATIENT)
Dept: GENERAL RADIOLOGY | Age: 57
End: 2022-04-05
Payer: COMMERCIAL

## 2022-04-05 ENCOUNTER — HOSPITAL ENCOUNTER (EMERGENCY)
Age: 57
Discharge: HOME | End: 2022-04-05
Attending: EMERGENCY MEDICINE
Payer: COMMERCIAL

## 2022-04-05 VITALS
HEART RATE: 90 BPM | WEIGHT: 235 LBS | BODY MASS INDEX: 34.8 KG/M2 | HEIGHT: 69 IN | RESPIRATION RATE: 18 BRPM | SYSTOLIC BLOOD PRESSURE: 97 MMHG | DIASTOLIC BLOOD PRESSURE: 57 MMHG | OXYGEN SATURATION: 97 % | TEMPERATURE: 98.2 F

## 2022-04-05 DIAGNOSIS — R07.9 CHEST PAIN, UNSPECIFIED TYPE: Primary | ICD-10-CM

## 2022-04-05 DIAGNOSIS — R79.1 SUPRATHERAPEUTIC INR: ICD-10-CM

## 2022-04-05 LAB
ALBUMIN SERPL-MCNC: 3.7 G/DL (ref 3.5–5.2)
ALP BLD-CCNC: 154 U/L (ref 40–129)
ALT SERPL-CCNC: 25 U/L (ref 0–40)
ANION GAP SERPL CALCULATED.3IONS-SCNC: 15 MMOL/L (ref 7–16)
AST SERPL-CCNC: 35 U/L (ref 0–39)
BILIRUB SERPL-MCNC: <0.2 MG/DL (ref 0–1.2)
BILIRUBIN DIRECT: <0.2 MG/DL (ref 0–0.3)
BILIRUBIN, INDIRECT: ABNORMAL MG/DL (ref 0–1)
BUN BLDV-MCNC: 30 MG/DL (ref 6–20)
CALCIUM SERPL-MCNC: 8.6 MG/DL (ref 8.6–10.2)
CHLORIDE BLD-SCNC: 105 MMOL/L (ref 98–107)
CO2: 16 MMOL/L (ref 22–29)
CREAT SERPL-MCNC: 1.7 MG/DL (ref 0.7–1.2)
D DIMER: 265 NG/ML DDU
GFR AFRICAN AMERICAN: 51
GFR NON-AFRICAN AMERICAN: 42 ML/MIN/1.73
GLUCOSE BLD-MCNC: 105 MG/DL (ref 74–99)
HCT VFR BLD CALC: 42.6 % (ref 37–54)
HEMOGLOBIN: 13.9 G/DL (ref 12.5–16.5)
INR BLD: >10
MAGNESIUM: 2.1 MG/DL (ref 1.6–2.6)
MCH RBC QN AUTO: 31.2 PG (ref 26–35)
MCHC RBC AUTO-ENTMCNC: 32.6 % (ref 32–34.5)
MCV RBC AUTO: 95.7 FL (ref 80–99.9)
PDW BLD-RTO: 17.3 FL (ref 11.5–15)
PLATELET # BLD: 176 E9/L (ref 130–450)
PMV BLD AUTO: 12.1 FL (ref 7–12)
POTASSIUM SERPL-SCNC: 4 MMOL/L (ref 3.5–5)
PRO-BNP: 598 PG/ML (ref 0–125)
PROTHROMBIN TIME: >120 SEC (ref 9.3–12.4)
RBC # BLD: 4.45 E12/L (ref 3.8–5.8)
REASON FOR REJECTION: NORMAL
REJECTED TEST: NORMAL
SODIUM BLD-SCNC: 136 MMOL/L (ref 132–146)
TOTAL PROTEIN: 6.7 G/DL (ref 6.4–8.3)
TROPONIN, HIGH SENSITIVITY: 29 NG/L (ref 0–11)
WBC # BLD: 7.1 E9/L (ref 4.5–11.5)

## 2022-04-05 PROCEDURE — 96374 THER/PROPH/DIAG INJ IV PUSH: CPT

## 2022-04-05 PROCEDURE — 80076 HEPATIC FUNCTION PANEL: CPT

## 2022-04-05 PROCEDURE — 83735 ASSAY OF MAGNESIUM: CPT

## 2022-04-05 PROCEDURE — 85378 FIBRIN DEGRADE SEMIQUANT: CPT

## 2022-04-05 PROCEDURE — 83880 ASSAY OF NATRIURETIC PEPTIDE: CPT

## 2022-04-05 PROCEDURE — 80048 BASIC METABOLIC PNL TOTAL CA: CPT

## 2022-04-05 PROCEDURE — 85027 COMPLETE CBC AUTOMATED: CPT

## 2022-04-05 PROCEDURE — 85610 PROTHROMBIN TIME: CPT

## 2022-04-05 PROCEDURE — 99284 EMERGENCY DEPT VISIT MOD MDM: CPT

## 2022-04-05 PROCEDURE — 93005 ELECTROCARDIOGRAM TRACING: CPT | Performed by: EMERGENCY MEDICINE

## 2022-04-05 PROCEDURE — 71045 X-RAY EXAM CHEST 1 VIEW: CPT

## 2022-04-05 PROCEDURE — 84484 ASSAY OF TROPONIN QUANT: CPT

## 2022-04-05 PROCEDURE — 6360000002 HC RX W HCPCS: Performed by: EMERGENCY MEDICINE

## 2022-04-05 RX ORDER — FENTANYL CITRATE 50 UG/ML
100 INJECTION, SOLUTION INTRAMUSCULAR; INTRAVENOUS ONCE
Status: COMPLETED | OUTPATIENT
Start: 2022-04-05 | End: 2022-04-05

## 2022-04-05 RX ORDER — SODIUM CHLORIDE 0.9 % (FLUSH) 0.9 %
10 SYRINGE (ML) INJECTION PRN
Status: DISCONTINUED | OUTPATIENT
Start: 2022-04-05 | End: 2022-04-06 | Stop reason: HOSPADM

## 2022-04-05 RX ADMIN — FENTANYL CITRATE 100 MCG: 50 INJECTION, SOLUTION INTRAMUSCULAR; INTRAVENOUS at 20:19

## 2022-04-05 ASSESSMENT — ENCOUNTER SYMPTOMS
ABDOMINAL PAIN: 0
SHORTNESS OF BREATH: 1
DIARRHEA: 0
EYE DISCHARGE: 0
SORE THROAT: 0
BACK PAIN: 0
VOMITING: 0
EYE PAIN: 0
COUGH: 0
EYE REDNESS: 0
NAUSEA: 0
SINUS PRESSURE: 0
WHEEZING: 0

## 2022-04-05 ASSESSMENT — PAIN DESCRIPTION - LOCATION: LOCATION: CHEST

## 2022-04-05 ASSESSMENT — PAIN DESCRIPTION - PAIN TYPE: TYPE: ACUTE PAIN

## 2022-04-05 ASSESSMENT — PAIN DESCRIPTION - FREQUENCY: FREQUENCY: CONTINUOUS

## 2022-04-05 ASSESSMENT — PAIN SCALES - GENERAL
PAINLEVEL_OUTOF10: 8
PAINLEVEL_OUTOF10: 10

## 2022-04-05 ASSESSMENT — PAIN DESCRIPTION - DESCRIPTORS: DESCRIPTORS: ACHING

## 2022-04-06 LAB
EKG ATRIAL RATE: 94 BPM
EKG P AXIS: 20 DEGREES
EKG P-R INTERVAL: 236 MS
EKG Q-T INTERVAL: 400 MS
EKG QRS DURATION: 144 MS
EKG QTC CALCULATION (BAZETT): 500 MS
EKG R AXIS: -58 DEGREES
EKG T AXIS: 42 DEGREES
EKG VENTRICULAR RATE: 94 BPM

## 2022-04-06 PROCEDURE — 93010 ELECTROCARDIOGRAM REPORT: CPT | Performed by: INTERNAL MEDICINE

## 2022-04-06 NOTE — ED PROVIDER NOTES
This patient reports intermittent chest pain associated shortness of breath over the last couple months. He reports he had a similar episode when he was admitted to [facility] on March 24. He was noted to have tibial plateau fracture at that time. He was also admittedly out of his medications including Coumadin, for the last 30 days. They reinstituted his Coumadin. He was seen by orthopedics and placed in a knee splint. Today, he complains of sudden onset of this chest pain associate with shortness of breath. He describes it as similar to his previous bouts. He believes he may be in heart failure. He is worried that they did not perform a cardiac work-up during his recent admission in [facility]. He reports he currently does not have a cardiologist.  He also complains of left knee pain since his recent fracture. I reviewed his chart from his recent admission. The history is provided by the patient. Chest Pain  Pain location:  Substernal area  Pain quality: aching    Pain radiates to:  Does not radiate  Pain severity:  Moderate  Onset quality:  Sudden  Duration:  1 day  Timing:  Constant  Progression:  Unchanged  Chronicity:  Recurrent  Relieved by:  None tried  Worsened by:  Nothing  Ineffective treatments:  None tried  Associated symptoms: shortness of breath    Associated symptoms: no abdominal pain, no back pain, no cough, no fatigue, no fever, no headache, no nausea, no vomiting and no weakness    Risk factors: high cholesterol, hypertension, male sex and obesity         Review of Systems   Constitutional: Negative for chills, fatigue and fever. HENT: Negative for sinus pressure and sore throat. Eyes: Negative for pain, discharge and redness. Respiratory: Positive for shortness of breath. Negative for cough and wheezing. Cardiovascular: Positive for chest pain. Gastrointestinal: Negative for abdominal pain, diarrhea, nausea and vomiting. Genitourinary: Negative for dysuria and frequency. Musculoskeletal: Positive for arthralgias. Negative for back pain. Skin: Negative for rash and wound. Neurological: Negative for weakness and headaches. Hematological: Negative for adenopathy. All other systems reviewed and are negative. Physical Exam  Vitals and nursing note reviewed. Constitutional:       Appearance: He is well-developed. HENT:      Head: Normocephalic and atraumatic. Eyes:      Pupils: Pupils are equal, round, and reactive to light. Cardiovascular:      Rate and Rhythm: Normal rate and regular rhythm. Heart sounds: Murmur heard. Comments: Cardiac murmur noted associated with heart valve replacement. Pulmonary:      Effort: Pulmonary effort is normal. No respiratory distress. Breath sounds: Normal breath sounds. No wheezing or rales. Abdominal:      General: Bowel sounds are normal.      Palpations: Abdomen is soft. Tenderness: There is no abdominal tenderness. There is no guarding or rebound. Musculoskeletal:         General: Tenderness present. Cervical back: Normal range of motion and neck supple. Comments: Left knee is in immobilizer. General tenderness to palpation. Skin:     General: Skin is warm and dry. Neurological:      Mental Status: He is alert and oriented to person, place, and time. Cranial Nerves: No cranial nerve deficit. Coordination: Coordination normal.          Procedures     MDM     EKG: This EKG is signed and interpreted by me. Rate: 94  Rhythm: Sinus  Interpretation: no acute changes, non-specific EKG, 1st degree AV block and bifascicular block, LVH. Comparison: stable as compared to patient's most recent EKG of 3/24/22.             --------------------------------------------- PAST HISTORY ---------------------------------------------  Past Medical History:  has no past medical history on file.     Past Surgical History:  has no past surgical history on file. Social History:  reports that he has been smoking cigarettes. He has been smoking about 1.00 pack per day. He has never used smokeless tobacco. He reports current alcohol use. He reports that he does not use drugs. Family History: family history is not on file. The patients home medications have been reviewed. Allergies: Patient has no known allergies.     -------------------------------------------------- RESULTS -------------------------------------------------  Labs:  Results for orders placed or performed during the hospital encounter of 57/97/47   Basic metabolic panel   Result Value Ref Range    Sodium 136 132 - 146 mmol/L    Potassium 4.0 3.5 - 5.0 mmol/L    Chloride 105 98 - 107 mmol/L    CO2 16 (L) 22 - 29 mmol/L    Anion Gap 15 7 - 16 mmol/L    Glucose 105 (H) 74 - 99 mg/dL    BUN 30 (H) 6 - 20 mg/dL    CREATININE 1.7 (H) 0.7 - 1.2 mg/dL    GFR Non-African American 42 >=60 mL/min/1.73    GFR African American 51     Calcium 8.6 8.6 - 10.2 mg/dL   CBC   Result Value Ref Range    WBC 7.1 4.5 - 11.5 E9/L    RBC 4.45 3.80 - 5.80 E12/L    Hemoglobin 13.9 12.5 - 16.5 g/dL    Hematocrit 42.6 37.0 - 54.0 %    MCV 95.7 80.0 - 99.9 fL    MCH 31.2 26.0 - 35.0 pg    MCHC 32.6 32.0 - 34.5 %    RDW 17.3 (H) 11.5 - 15.0 fL    Platelets 964 245 - 637 E9/L    MPV 12.1 (H) 7.0 - 12.0 fL   Brain Natriuretic Peptide   Result Value Ref Range    Pro- (H) 0 - 125 pg/mL   Magnesium   Result Value Ref Range    Magnesium 2.1 1.6 - 2.6 mg/dL   Hepatic Function Panel   Result Value Ref Range    Total Protein 6.7 6.4 - 8.3 g/dL    Albumin 3.7 3.5 - 5.2 g/dL    Alkaline Phosphatase 154 (H) 40 - 129 U/L    ALT 25 0 - 40 U/L    AST 35 0 - 39 U/L    Total Bilirubin <0.2 0.0 - 1.2 mg/dL    Bilirubin, Direct <0.2 0.0 - 0.3 mg/dL    Bilirubin, Indirect see below 0.0 - 1.0 mg/dL   Protime-INR   Result Value Ref Range    Protime >120.0 (H) 9.3 - 12.4 sec    INR >10.0 (HH)    D-Dimer, Quantitative   Result Value Ref Range    D-Dimer, Quant 265 ng/mL DDU   SPECIMEN REJECTION   Result Value Ref Range    Rejected Test trop     Reason for Rejection see below    Troponin   Result Value Ref Range    Troponin, High Sensitivity 29 (H) 0 - 11 ng/L   EKG 12 Lead   Result Value Ref Range    Ventricular Rate 94 BPM    Atrial Rate 94 BPM    P-R Interval 236 ms    QRS Duration 144 ms    Q-T Interval 400 ms    QTc Calculation (Bazett) 500 ms    P Axis 20 degrees    R Axis -58 degrees    T Axis 42 degrees       Radiology:  XR CHEST PORTABLE   Final Result   No acute process. Cardiomegaly. ------------------------- NURSING NOTES AND VITALS REVIEWED ---------------------------  Date / Time Roomed:  4/5/2022  7:59 PM  ED Bed Assignment:  04/04    The nursing notes within the ED encounter and vital signs as below have been reviewed. BP (!) 97/57   Pulse 90   Temp 98.2 °F (36.8 °C) (Oral)   Resp 18   Ht 5' 9\" (1.753 m)   Wt 235 lb (106.6 kg)   SpO2 97%   BMI 34.70 kg/m²   Oxygen Saturation Interpretation: Normal      ------------------------------------------ PROGRESS NOTES ------------------------------------------  11:27 PM EDT  I have spoken with the patient and discussed todays results, in addition to providing specific details for the plan of care and counseling regarding the diagnosis and prognosis. Their questions are answered at this time and they are agreeable with the plan. I discussed at length with them reasons for immediate return here for re evaluation. They will followup with their primary care physician by calling their office tomorrow. Patient understands reasons for immediate return to the hospital.  We discussed that he is not to take his Coumadin until he speaks with his primary care physician within the next 2 days. He understands he will most likely need to have his INR rechecked in 2 to 3 days. He has been cautioned to avoid any trauma.     --------------------------------- ADDITIONAL PROVIDER NOTES ---------------------------------  At this time the patient is without objective evidence of an acute process requiring hospitalization or inpatient management. They have remained hemodynamically stable throughout their entire ED visit and are stable for discharge with outpatient follow-up. The plan has been discussed in detail and they are aware of the specific conditions for emergent return, as well as the importance of follow-up. New Prescriptions    No medications on file       Diagnosis:  1. Chest pain, unspecified type    2. Supratherapeutic INR        Disposition:  Patient's disposition: Discharge to home  Patient's condition is stable.        Jaci Campbell DO  04/05/22 1383

## 2022-04-06 NOTE — ED NOTES
Patient c/o increased pain level and states pain medication was ineffective. BP 97/57. Dr Hal Thomas notified of all. NNO at this time.      Yanelis Giraldo, RN  04/05/22 [id number]

## 2022-04-06 NOTE — ED NOTES
Patient continues to c/o pain and requesting pain medication. Explained to pt BP is too low at this time and physician has not ordered more pain medicine due to BP. Pt requesting this RN to ask again. Dr Chauncey Lizama notified of all.      Rajat Cheek RN  04/05/22 [id number]

## 2022-05-11 ENCOUNTER — APPOINTMENT (OUTPATIENT)
Dept: GENERAL RADIOLOGY | Age: 57
DRG: 253 | End: 2022-05-11
Payer: COMMERCIAL

## 2022-05-11 LAB
ALBUMIN SERPL-MCNC: 3.6 G/DL (ref 3.5–5.2)
ALP BLD-CCNC: 214 U/L (ref 40–129)
ALT SERPL-CCNC: 64 U/L (ref 0–40)
ANION GAP SERPL CALCULATED.3IONS-SCNC: 16 MMOL/L (ref 7–16)
APTT: 93.1 SEC (ref 24.5–35.1)
AST SERPL-CCNC: 115 U/L (ref 0–39)
BASOPHILS ABSOLUTE: 0.06 E9/L (ref 0–0.2)
BASOPHILS RELATIVE PERCENT: 0.6 % (ref 0–2)
BILIRUB SERPL-MCNC: 1.7 MG/DL (ref 0–1.2)
BUN BLDV-MCNC: 10 MG/DL (ref 6–20)
CALCIUM SERPL-MCNC: 7.8 MG/DL (ref 8.6–10.2)
CHLORIDE BLD-SCNC: 96 MMOL/L (ref 98–107)
CO2: 23 MMOL/L (ref 22–29)
CREAT SERPL-MCNC: 1.5 MG/DL (ref 0.7–1.2)
EOSINOPHILS ABSOLUTE: 0.1 E9/L (ref 0.05–0.5)
EOSINOPHILS RELATIVE PERCENT: 1 % (ref 0–6)
GFR AFRICAN AMERICAN: 59
GFR NON-AFRICAN AMERICAN: 48 ML/MIN/1.73
GLUCOSE BLD-MCNC: 128 MG/DL (ref 74–99)
HCT VFR BLD CALC: 36.9 % (ref 37–54)
HEMOGLOBIN: 12.5 G/DL (ref 12.5–16.5)
IMMATURE GRANULOCYTES #: 0.05 E9/L
IMMATURE GRANULOCYTES %: 0.5 % (ref 0–5)
INR BLD: >10
LYMPHOCYTES ABSOLUTE: 0.68 E9/L (ref 1.5–4)
LYMPHOCYTES RELATIVE PERCENT: 6.7 % (ref 20–42)
MCH RBC QN AUTO: 31.9 PG (ref 26–35)
MCHC RBC AUTO-ENTMCNC: 33.9 % (ref 32–34.5)
MCV RBC AUTO: 94.1 FL (ref 80–99.9)
MONOCYTES ABSOLUTE: 1.06 E9/L (ref 0.1–0.95)
MONOCYTES RELATIVE PERCENT: 10.5 % (ref 2–12)
NEUTROPHILS ABSOLUTE: 8.18 E9/L (ref 1.8–7.3)
NEUTROPHILS RELATIVE PERCENT: 80.7 % (ref 43–80)
PDW BLD-RTO: 15.6 FL (ref 11.5–15)
PLATELET # BLD: 189 E9/L (ref 130–450)
PMV BLD AUTO: 11 FL (ref 7–12)
POTASSIUM SERPL-SCNC: 3.2 MMOL/L (ref 3.5–5)
PRO-BNP: 792 PG/ML (ref 0–125)
PROTHROMBIN TIME: >120 SEC (ref 9.3–12.4)
RBC # BLD: 3.92 E12/L (ref 3.8–5.8)
SODIUM BLD-SCNC: 135 MMOL/L (ref 132–146)
TOTAL PROTEIN: 6.7 G/DL (ref 6.4–8.3)
TROPONIN, HIGH SENSITIVITY: 42 NG/L (ref 0–11)
TROPONIN, HIGH SENSITIVITY: 42 NG/L (ref 0–11)
WBC # BLD: 10.1 E9/L (ref 4.5–11.5)

## 2022-05-11 PROCEDURE — 85025 COMPLETE CBC W/AUTO DIFF WBC: CPT

## 2022-05-11 PROCEDURE — 85610 PROTHROMBIN TIME: CPT

## 2022-05-11 PROCEDURE — 80053 COMPREHEN METABOLIC PANEL: CPT

## 2022-05-11 PROCEDURE — 73502 X-RAY EXAM HIP UNI 2-3 VIEWS: CPT

## 2022-05-11 PROCEDURE — 96375 TX/PRO/DX INJ NEW DRUG ADDON: CPT

## 2022-05-11 PROCEDURE — 99285 EMERGENCY DEPT VISIT HI MDM: CPT

## 2022-05-11 PROCEDURE — 96374 THER/PROPH/DIAG INJ IV PUSH: CPT

## 2022-05-11 PROCEDURE — 71046 X-RAY EXAM CHEST 2 VIEWS: CPT

## 2022-05-11 PROCEDURE — 83880 ASSAY OF NATRIURETIC PEPTIDE: CPT

## 2022-05-11 PROCEDURE — 93005 ELECTROCARDIOGRAM TRACING: CPT | Performed by: PHYSICIAN ASSISTANT

## 2022-05-11 PROCEDURE — 85730 THROMBOPLASTIN TIME PARTIAL: CPT

## 2022-05-11 PROCEDURE — 84484 ASSAY OF TROPONIN QUANT: CPT

## 2022-05-11 ASSESSMENT — LIFESTYLE VARIABLES
HOW OFTEN DO YOU HAVE A DRINK CONTAINING ALCOHOL: NEVER
HOW MANY STANDARD DRINKS CONTAINING ALCOHOL DO YOU HAVE ON A TYPICAL DAY: 3 OR 4

## 2022-05-11 ASSESSMENT — PAIN SCALES - GENERAL: PAINLEVEL_OUTOF10: 9

## 2022-05-11 ASSESSMENT — PAIN - FUNCTIONAL ASSESSMENT: PAIN_FUNCTIONAL_ASSESSMENT: 0-10

## 2022-05-11 NOTE — ED NOTES
Department of Emergency Medicine  FIRST PROVIDER TRIAGE NOTE             Independent MLP           5/11/22  1:39 PM EDT    Date of Encounter: 5/11/22   MRN: [id number]      HPI: Jimmy Hartman is a 55 y.o. male who presents to the ED for No chief complaint on file. Pt presenting with right leg pain for the past few days. Pt unable to walk. Pt c/o left sided cp, nausea, soh which radiates to left arm. Pt takes warfarin. ROS: Negative for abd pain or back pain. PE: Gen Appearance/Constitutional: alert  HEENT: NC/NT. PERRLA,  Airway patent. Initial Plan of Care: All treatment areas with department are currently occupied. Plan to order/Initiate the following while awaiting opening in ED: labs, EKG and imaging studies.   Initiate Treatment-Testing, Proceed toTreatment Area When Bed Available for ED Attending/MLP to Continue Care    Electronically signed by Danny Rg PA-C   DD: 5/11/22       Danny Rg PA-C  05/11/22 [id number]

## 2022-05-12 ENCOUNTER — HOSPITAL ENCOUNTER (INPATIENT)
Age: 57
LOS: 8 days | Discharge: HOME | DRG: 253 | End: 2022-05-20
Attending: EMERGENCY MEDICINE | Admitting: FAMILY MEDICINE
Payer: COMMERCIAL

## 2022-05-12 ENCOUNTER — APPOINTMENT (OUTPATIENT)
Dept: CT IMAGING | Age: 57
DRG: 253 | End: 2022-05-12
Payer: COMMERCIAL

## 2022-05-12 DIAGNOSIS — R07.9 CHEST PAIN, UNSPECIFIED TYPE: ICD-10-CM

## 2022-05-12 DIAGNOSIS — T45.515A WARFARIN-INDUCED COAGULOPATHY (HCC): ICD-10-CM

## 2022-05-12 DIAGNOSIS — K66.1 RETROPERITONEAL HEMATOMA: Primary | ICD-10-CM

## 2022-05-12 DIAGNOSIS — R10.9 ABDOMINAL PAIN, UNSPECIFIED ABDOMINAL LOCATION: ICD-10-CM

## 2022-05-12 DIAGNOSIS — D68.32 WARFARIN-INDUCED COAGULOPATHY (HCC): ICD-10-CM

## 2022-05-12 DIAGNOSIS — M79.604 RIGHT LEG PAIN: ICD-10-CM

## 2022-05-12 PROBLEM — K68.3 RETROPERITONEAL HEMATOMA: Status: ACTIVE | Noted: 2022-05-12

## 2022-05-12 LAB
ABO/RH: NORMAL
ALBUMIN SERPL-MCNC: 3.3 G/DL (ref 3.5–5.2)
ALP BLD-CCNC: 189 U/L (ref 40–129)
ALT SERPL-CCNC: 52 U/L (ref 0–40)
ANION GAP SERPL CALCULATED.3IONS-SCNC: 14 MMOL/L (ref 7–16)
ANION GAP SERPL CALCULATED.3IONS-SCNC: 16 MMOL/L (ref 7–16)
ANTIBODY SCREEN: NORMAL
AST SERPL-CCNC: 74 U/L (ref 0–39)
BACTERIA: NORMAL /HPF
BASOPHILS ABSOLUTE: 0.05 E9/L (ref 0–0.2)
BASOPHILS RELATIVE PERCENT: 0.5 % (ref 0–2)
BILIRUB SERPL-MCNC: 1.3 MG/DL (ref 0–1.2)
BILIRUBIN URINE: NEGATIVE
BLOOD, URINE: ABNORMAL
BUN BLDV-MCNC: 18 MG/DL (ref 6–20)
BUN BLDV-MCNC: 20 MG/DL (ref 6–20)
CALCIUM SERPL-MCNC: 7.5 MG/DL (ref 8.6–10.2)
CALCIUM SERPL-MCNC: 7.7 MG/DL (ref 8.6–10.2)
CHLORIDE BLD-SCNC: 96 MMOL/L (ref 98–107)
CHLORIDE BLD-SCNC: 97 MMOL/L (ref 98–107)
CHP ED QC CHECK: YES
CLARITY: CLEAR
CO2: 23 MMOL/L (ref 22–29)
CO2: 24 MMOL/L (ref 22–29)
COLOR: YELLOW
CREAT SERPL-MCNC: 2.1 MG/DL (ref 0.7–1.2)
CREAT SERPL-MCNC: 2.4 MG/DL (ref 0.7–1.2)
EOSINOPHILS ABSOLUTE: 0.09 E9/L (ref 0.05–0.5)
EOSINOPHILS RELATIVE PERCENT: 1 % (ref 0–6)
EPITHELIAL CELLS, UA: NORMAL /HPF
GFR AFRICAN AMERICAN: 34
GFR AFRICAN AMERICAN: 40
GFR NON-AFRICAN AMERICAN: 28 ML/MIN/1.73
GFR NON-AFRICAN AMERICAN: 33 ML/MIN/1.73
GLUCOSE BLD-MCNC: 121 MG/DL (ref 74–99)
GLUCOSE BLD-MCNC: 99 MG/DL (ref 74–99)
GLUCOSE URINE: NEGATIVE MG/DL
HCT VFR BLD CALC: 33.6 % (ref 37–54)
HCT VFR BLD CALC: 35.7 % (ref 37–54)
HEMOCCULT STL QL: ABNORMAL
HEMOGLOBIN: 11 G/DL (ref 12.5–16.5)
HEMOGLOBIN: 11.7 G/DL (ref 12.5–16.5)
IMMATURE GRANULOCYTES #: 0.06 E9/L
IMMATURE GRANULOCYTES %: 0.6 % (ref 0–5)
INR BLD: 6.8
KETONES, URINE: NEGATIVE MG/DL
LEUKOCYTE ESTERASE, URINE: NEGATIVE
LYMPHOCYTES ABSOLUTE: 0.82 E9/L (ref 1.5–4)
LYMPHOCYTES RELATIVE PERCENT: 8.8 % (ref 20–42)
MAGNESIUM: 1 MG/DL (ref 1.6–2.6)
MAGNESIUM: 1.4 MG/DL (ref 1.6–2.6)
MCH RBC QN AUTO: 31.9 PG (ref 26–35)
MCHC RBC AUTO-ENTMCNC: 32.7 % (ref 32–34.5)
MCV RBC AUTO: 97.4 FL (ref 80–99.9)
MONOCYTES ABSOLUTE: 0.93 E9/L (ref 0.1–0.95)
MONOCYTES RELATIVE PERCENT: 10 % (ref 2–12)
NEUTROPHILS ABSOLUTE: 7.39 E9/L (ref 1.8–7.3)
NEUTROPHILS RELATIVE PERCENT: 79.1 % (ref 43–80)
NITRITE, URINE: NEGATIVE
PDW BLD-RTO: 15.9 FL (ref 11.5–15)
PH UA: 7.5 (ref 5–9)
PLATELET # BLD: 140 E9/L (ref 130–450)
PMV BLD AUTO: 10.8 FL (ref 7–12)
POTASSIUM REFLEX MAGNESIUM: 3.4 MMOL/L (ref 3.5–5)
POTASSIUM REFLEX MAGNESIUM: 3.4 MMOL/L (ref 3.5–5)
PROTEIN UA: 100 MG/DL
PROTHROMBIN TIME: 76.6 SEC (ref 9.3–12.4)
RBC # BLD: 3.45 E12/L (ref 3.8–5.8)
RBC UA: NORMAL /HPF (ref 0–2)
SODIUM BLD-SCNC: 134 MMOL/L (ref 132–146)
SODIUM BLD-SCNC: 136 MMOL/L (ref 132–146)
SPECIFIC GRAVITY UA: 1.01 (ref 1–1.03)
TOTAL PROTEIN: 6.2 G/DL (ref 6.4–8.3)
TROPONIN, HIGH SENSITIVITY: 63 NG/L (ref 0–11)
TSH SERPL DL<=0.05 MIU/L-ACNC: 1.99 UIU/ML (ref 0.27–4.2)
UROBILINOGEN, URINE: 4 E.U./DL
WBC # BLD: 9.3 E9/L (ref 4.5–11.5)
WBC UA: NORMAL /HPF (ref 0–5)

## 2022-05-12 PROCEDURE — 99254 IP/OBS CNSLTJ NEW/EST MOD 60: CPT | Performed by: INTERNAL MEDICINE

## 2022-05-12 PROCEDURE — 85610 PROTHROMBIN TIME: CPT

## 2022-05-12 PROCEDURE — 86900 BLOOD TYPING SEROLOGIC ABO: CPT

## 2022-05-12 PROCEDURE — 2580000003 HC RX 258: Performed by: FAMILY MEDICINE

## 2022-05-12 PROCEDURE — 6370000000 HC RX 637 (ALT 250 FOR IP): Performed by: FAMILY MEDICINE

## 2022-05-12 PROCEDURE — 81001 URINALYSIS AUTO W/SCOPE: CPT

## 2022-05-12 PROCEDURE — 85014 HEMATOCRIT: CPT

## 2022-05-12 PROCEDURE — 6360000002 HC RX W HCPCS: Performed by: STUDENT IN AN ORGANIZED HEALTH CARE EDUCATION/TRAINING PROGRAM

## 2022-05-12 PROCEDURE — 2580000003 HC RX 258: Performed by: STUDENT IN AN ORGANIZED HEALTH CARE EDUCATION/TRAINING PROGRAM

## 2022-05-12 PROCEDURE — 2140000000 HC CCU INTERMEDIATE R&B

## 2022-05-12 PROCEDURE — 6370000000 HC RX 637 (ALT 250 FOR IP): Performed by: SURGERY

## 2022-05-12 PROCEDURE — 80048 BASIC METABOLIC PNL TOTAL CA: CPT

## 2022-05-12 PROCEDURE — 85025 COMPLETE CBC W/AUTO DIFF WBC: CPT

## 2022-05-12 PROCEDURE — 86850 RBC ANTIBODY SCREEN: CPT

## 2022-05-12 PROCEDURE — 6360000002 HC RX W HCPCS: Performed by: EMERGENCY MEDICINE

## 2022-05-12 PROCEDURE — 6360000002 HC RX W HCPCS: Performed by: FAMILY MEDICINE

## 2022-05-12 PROCEDURE — 36415 COLL VENOUS BLD VENIPUNCTURE: CPT

## 2022-05-12 PROCEDURE — 84443 ASSAY THYROID STIM HORMONE: CPT

## 2022-05-12 PROCEDURE — 6360000002 HC RX W HCPCS: Performed by: NURSE PRACTITIONER

## 2022-05-12 PROCEDURE — 6370000000 HC RX 637 (ALT 250 FOR IP): Performed by: STUDENT IN AN ORGANIZED HEALTH CARE EDUCATION/TRAINING PROGRAM

## 2022-05-12 PROCEDURE — 80053 COMPREHEN METABOLIC PANEL: CPT

## 2022-05-12 PROCEDURE — 83735 ASSAY OF MAGNESIUM: CPT

## 2022-05-12 PROCEDURE — 84484 ASSAY OF TROPONIN QUANT: CPT

## 2022-05-12 PROCEDURE — 86901 BLOOD TYPING SEROLOGIC RH(D): CPT

## 2022-05-12 PROCEDURE — 99222 1ST HOSP IP/OBS MODERATE 55: CPT | Performed by: SURGERY

## 2022-05-12 PROCEDURE — 85018 HEMOGLOBIN: CPT

## 2022-05-12 PROCEDURE — 2580000003 HC RX 258: Performed by: RADIOLOGY

## 2022-05-12 PROCEDURE — 74177 CT ABD & PELVIS W/CONTRAST: CPT

## 2022-05-12 PROCEDURE — 6360000004 HC RX CONTRAST MEDICATION: Performed by: RADIOLOGY

## 2022-05-12 RX ORDER — POTASSIUM CHLORIDE 20 MEQ/1
40 TABLET, EXTENDED RELEASE ORAL ONCE
Status: DISCONTINUED | OUTPATIENT
Start: 2022-05-12 | End: 2022-05-12

## 2022-05-12 RX ORDER — OXYCODONE HYDROCHLORIDE 10 MG/1
10 TABLET ORAL EVERY 4 HOURS PRN
Status: DISCONTINUED | OUTPATIENT
Start: 2022-05-12 | End: 2022-05-14

## 2022-05-12 RX ORDER — SODIUM CHLORIDE, SODIUM LACTATE, POTASSIUM CHLORIDE, CALCIUM CHLORIDE 600; 310; 30; 20 MG/100ML; MG/100ML; MG/100ML; MG/100ML
INJECTION, SOLUTION INTRAVENOUS CONTINUOUS
Status: DISCONTINUED | OUTPATIENT
Start: 2022-05-12 | End: 2022-05-16

## 2022-05-12 RX ORDER — MORPHINE SULFATE 2 MG/ML
2 INJECTION, SOLUTION INTRAMUSCULAR; INTRAVENOUS ONCE
Status: COMPLETED | OUTPATIENT
Start: 2022-05-12 | End: 2022-05-12

## 2022-05-12 RX ORDER — SODIUM CHLORIDE 9 MG/ML
50 INJECTION, SOLUTION INTRAVENOUS ONCE
Status: COMPLETED | OUTPATIENT
Start: 2022-05-12 | End: 2022-05-12

## 2022-05-12 RX ORDER — ACETAMINOPHEN 325 MG/1
650 TABLET ORAL EVERY 6 HOURS PRN
Status: DISCONTINUED | OUTPATIENT
Start: 2022-05-12 | End: 2022-05-14

## 2022-05-12 RX ORDER — MAGNESIUM SULFATE IN WATER 40 MG/ML
2000 INJECTION, SOLUTION INTRAVENOUS ONCE
Status: COMPLETED | OUTPATIENT
Start: 2022-05-12 | End: 2022-05-12

## 2022-05-12 RX ORDER — PROMETHAZINE HYDROCHLORIDE 12.5 MG/1
12.5 TABLET ORAL EVERY 6 HOURS PRN
Status: DISCONTINUED | OUTPATIENT
Start: 2022-05-12 | End: 2022-05-20 | Stop reason: HOSPADM

## 2022-05-12 RX ORDER — MORPHINE SULFATE 2 MG/ML
4 INJECTION, SOLUTION INTRAMUSCULAR; INTRAVENOUS EVERY 4 HOURS PRN
Status: DISCONTINUED | OUTPATIENT
Start: 2022-05-12 | End: 2022-05-14

## 2022-05-12 RX ORDER — SODIUM CHLORIDE 9 MG/ML
INJECTION, SOLUTION INTRAVENOUS PRN
Status: DISCONTINUED | OUTPATIENT
Start: 2022-05-12 | End: 2022-05-20 | Stop reason: HOSPADM

## 2022-05-12 RX ORDER — POLYETHYLENE GLYCOL 3350 17 G/17G
17 POWDER, FOR SOLUTION ORAL DAILY PRN
Status: DISCONTINUED | OUTPATIENT
Start: 2022-05-12 | End: 2022-05-20 | Stop reason: HOSPADM

## 2022-05-12 RX ORDER — ACETAMINOPHEN 650 MG/1
650 SUPPOSITORY RECTAL EVERY 6 HOURS PRN
Status: DISCONTINUED | OUTPATIENT
Start: 2022-05-12 | End: 2022-05-14

## 2022-05-12 RX ORDER — POTASSIUM CHLORIDE 7.45 MG/ML
10 INJECTION INTRAVENOUS
Status: COMPLETED | OUTPATIENT
Start: 2022-05-12 | End: 2022-05-12

## 2022-05-12 RX ORDER — METHOCARBAMOL 750 MG/1
750 TABLET, FILM COATED ORAL 4 TIMES DAILY
Status: DISCONTINUED | OUTPATIENT
Start: 2022-05-12 | End: 2022-05-14

## 2022-05-12 RX ORDER — ARIPIPRAZOLE 15 MG/1
30 TABLET ORAL DAILY
Status: DISCONTINUED | OUTPATIENT
Start: 2022-05-12 | End: 2022-05-20 | Stop reason: HOSPADM

## 2022-05-12 RX ORDER — SODIUM CHLORIDE 0.9 % (FLUSH) 0.9 %
10 SYRINGE (ML) INJECTION PRN
Status: DISCONTINUED | OUTPATIENT
Start: 2022-05-12 | End: 2022-05-20 | Stop reason: HOSPADM

## 2022-05-12 RX ORDER — ROSUVASTATIN CALCIUM 20 MG/1
40 TABLET, COATED ORAL DAILY
Status: DISCONTINUED | OUTPATIENT
Start: 2022-05-12 | End: 2022-05-20 | Stop reason: HOSPADM

## 2022-05-12 RX ORDER — MORPHINE SULFATE 4 MG/ML
4 INJECTION, SOLUTION INTRAMUSCULAR; INTRAVENOUS EVERY 4 HOURS PRN
Status: DISCONTINUED | OUTPATIENT
Start: 2022-05-12 | End: 2022-05-12 | Stop reason: SDUPTHER

## 2022-05-12 RX ORDER — ONDANSETRON 2 MG/ML
4 INJECTION INTRAMUSCULAR; INTRAVENOUS EVERY 6 HOURS PRN
Status: DISCONTINUED | OUTPATIENT
Start: 2022-05-12 | End: 2022-05-12

## 2022-05-12 RX ORDER — ASPIRIN 81 MG/1
81 TABLET ORAL DAILY
Status: DISCONTINUED | OUTPATIENT
Start: 2022-05-12 | End: 2022-05-20 | Stop reason: HOSPADM

## 2022-05-12 RX ORDER — METOPROLOL SUCCINATE 25 MG/1
25 TABLET, EXTENDED RELEASE ORAL DAILY
Status: DISCONTINUED | OUTPATIENT
Start: 2022-05-12 | End: 2022-05-20 | Stop reason: HOSPADM

## 2022-05-12 RX ORDER — SODIUM CHLORIDE 9 MG/ML
INJECTION, SOLUTION INTRAVENOUS PRN
Status: DISCONTINUED | OUTPATIENT
Start: 2022-05-12 | End: 2022-05-13

## 2022-05-12 RX ORDER — GABAPENTIN 100 MG/1
100 CAPSULE ORAL 3 TIMES DAILY
Status: DISCONTINUED | OUTPATIENT
Start: 2022-05-12 | End: 2022-05-14

## 2022-05-12 RX ORDER — SODIUM CHLORIDE 9 MG/ML
50 INJECTION, SOLUTION INTRAVENOUS ONCE
Status: DISCONTINUED | OUTPATIENT
Start: 2022-05-12 | End: 2022-05-12 | Stop reason: CLARIF

## 2022-05-12 RX ORDER — SODIUM CHLORIDE 0.9 % (FLUSH) 0.9 %
10 SYRINGE (ML) INJECTION EVERY 12 HOURS SCHEDULED
Status: DISCONTINUED | OUTPATIENT
Start: 2022-05-12 | End: 2022-05-20 | Stop reason: HOSPADM

## 2022-05-12 RX ORDER — OXYCODONE HYDROCHLORIDE 5 MG/1
5 TABLET ORAL EVERY 4 HOURS PRN
Status: DISCONTINUED | OUTPATIENT
Start: 2022-05-12 | End: 2022-05-14

## 2022-05-12 RX ORDER — SODIUM CHLORIDE 0.9 % (FLUSH) 0.9 %
10 SYRINGE (ML) INJECTION PRN
Status: COMPLETED | OUTPATIENT
Start: 2022-05-12 | End: 2022-05-12

## 2022-05-12 RX ORDER — FENTANYL CITRATE 50 UG/ML
25 INJECTION, SOLUTION INTRAMUSCULAR; INTRAVENOUS ONCE
Status: COMPLETED | OUTPATIENT
Start: 2022-05-12 | End: 2022-05-12

## 2022-05-12 RX ADMIN — IOPAMIDOL 90 ML: 755 INJECTION, SOLUTION INTRAVENOUS at 01:49

## 2022-05-12 RX ADMIN — MORPHINE SULFATE 4 MG: 2 INJECTION, SOLUTION INTRAMUSCULAR; INTRAVENOUS at 10:11

## 2022-05-12 RX ADMIN — MAGNESIUM SULFATE HEPTAHYDRATE 2000 MG: 40 INJECTION, SOLUTION INTRAVENOUS at 13:48

## 2022-05-12 RX ADMIN — MORPHINE SULFATE 4 MG: 4 INJECTION, SOLUTION INTRAMUSCULAR; INTRAVENOUS at 04:52

## 2022-05-12 RX ADMIN — SODIUM CHLORIDE, POTASSIUM CHLORIDE, SODIUM LACTATE AND CALCIUM CHLORIDE: 600; 310; 30; 20 INJECTION, SOLUTION INTRAVENOUS at 16:37

## 2022-05-12 RX ADMIN — METOPROLOL SUCCINATE 25 MG: 25 TABLET, EXTENDED RELEASE ORAL at 10:10

## 2022-05-12 RX ADMIN — SODIUM CHLORIDE, PRESERVATIVE FREE 10 ML: 5 INJECTION INTRAVENOUS at 10:11

## 2022-05-12 RX ADMIN — METHOCARBAMOL 750 MG: 750 TABLET ORAL at 20:59

## 2022-05-12 RX ADMIN — OXYCODONE HYDROCHLORIDE 10 MG: 10 TABLET ORAL at 08:07

## 2022-05-12 RX ADMIN — SODIUM CHLORIDE 50 ML: 9 INJECTION, SOLUTION INTRAVENOUS at 05:59

## 2022-05-12 RX ADMIN — METHOCARBAMOL 750 MG: 750 TABLET ORAL at 16:38

## 2022-05-12 RX ADMIN — MORPHINE SULFATE 4 MG: 2 INJECTION, SOLUTION INTRAMUSCULAR; INTRAVENOUS at 14:25

## 2022-05-12 RX ADMIN — PROTHROMBIN, COAGULATION FACTOR VII HUMAN, COAGULATION FACTOR IX HUMAN, COAGULATION FACTOR X HUMAN, PROTEIN C, PROTEIN S HUMAN, AND WATER 5000 UNITS: KIT at 05:31

## 2022-05-12 RX ADMIN — POTASSIUM CHLORIDE 10 MEQ: 7.46 INJECTION, SOLUTION INTRAVENOUS at 03:40

## 2022-05-12 RX ADMIN — SODIUM CHLORIDE, POTASSIUM CHLORIDE, SODIUM LACTATE AND CALCIUM CHLORIDE: 600; 310; 30; 20 INJECTION, SOLUTION INTRAVENOUS at 03:39

## 2022-05-12 RX ADMIN — ARIPIPRAZOLE 30 MG: 15 TABLET ORAL at 12:09

## 2022-05-12 RX ADMIN — Medication 10 ML: at 01:49

## 2022-05-12 RX ADMIN — GABAPENTIN 100 MG: 100 CAPSULE ORAL at 13:50

## 2022-05-12 RX ADMIN — MORPHINE SULFATE 4 MG: 2 INJECTION, SOLUTION INTRAMUSCULAR; INTRAVENOUS at 19:20

## 2022-05-12 RX ADMIN — SODIUM CHLORIDE, PRESERVATIVE FREE 10 ML: 5 INJECTION INTRAVENOUS at 20:59

## 2022-05-12 RX ADMIN — MORPHINE SULFATE 2 MG: 2 INJECTION, SOLUTION INTRAMUSCULAR; INTRAVENOUS at 01:14

## 2022-05-12 RX ADMIN — PHYTONADIONE 5 MG: 10 INJECTION, EMULSION INTRAMUSCULAR; INTRAVENOUS; SUBCUTANEOUS at 04:00

## 2022-05-12 RX ADMIN — FENTANYL CITRATE 25 MCG: 50 INJECTION, SOLUTION INTRAMUSCULAR; INTRAVENOUS at 02:54

## 2022-05-12 RX ADMIN — POTASSIUM CHLORIDE 10 MEQ: 7.46 INJECTION, SOLUTION INTRAVENOUS at 04:58

## 2022-05-12 RX ADMIN — ROSUVASTATIN 40 MG: 20 TABLET, FILM COATED ORAL at 10:10

## 2022-05-12 RX ADMIN — GABAPENTIN 100 MG: 100 CAPSULE ORAL at 20:58

## 2022-05-12 ASSESSMENT — PAIN DESCRIPTION - DESCRIPTORS
DESCRIPTORS: ACHING;DULL;DISCOMFORT
DESCRIPTORS: THROBBING
DESCRIPTORS: SHOOTING;SQUEEZING;THROBBING
DESCRIPTORS: THROBBING

## 2022-05-12 ASSESSMENT — PAIN SCALES - GENERAL
PAINLEVEL_OUTOF10: 9
PAINLEVEL_OUTOF10: 9
PAINLEVEL_OUTOF10: 7
PAINLEVEL_OUTOF10: 5
PAINLEVEL_OUTOF10: 8
PAINLEVEL_OUTOF10: 9
PAINLEVEL_OUTOF10: 10

## 2022-05-12 ASSESSMENT — PAIN DESCRIPTION - LOCATION
LOCATION: GROIN;LEG;HIP
LOCATION: LEG

## 2022-05-12 ASSESSMENT — PAIN DESCRIPTION - ORIENTATION
ORIENTATION: RIGHT

## 2022-05-12 ASSESSMENT — PAIN - FUNCTIONAL ASSESSMENT
PAIN_FUNCTIONAL_ASSESSMENT: PREVENTS OR INTERFERES SOME ACTIVE ACTIVITIES AND ADLS
PAIN_FUNCTIONAL_ASSESSMENT: ACTIVITIES ARE NOT PREVENTED

## 2022-05-12 NOTE — PROGRESS NOTES
CC: Spontaneous retroperitoneal hematoma    Assessment:    Spontaneous retroperitoneal hematoma  Severe right groin and thigh pain-possibly neuropathic from compression  Chronic anticoagulation- received vitamin K and Kcentra  INR remains over 6 despite that  Hemoglobin has drifted a little bit  Alcohol abuse  Mechanical heart valves    Plan:  Transfuse 4 units FFP  Monitor H&H and INR  Continue multimodal analgesia-add Robaxin and gabapentin  Continue bedrest  Once INR is corrected and hemoglobin stable for 24 hours then it should be safe to resume anticoagulation      Subjective:  Reports severe right groin pain that radiates down his thigh    Objective:  General -obese white man.   Appears moderately uncomfortable  Neuro -awake alert attentive  Abdomen -nontender nondistended  Ext -no ecchymosis along right groin or thigh

## 2022-05-12 NOTE — CONSULTS
GENERAL SURGERY  CONSULT NOTE  5/12/2022    Physician Consulted: Dr. Sherren Gibney  Reason for Consult: RP hematoma  Referring Physician: Dr. Susnaa VELASCO  Yogi Byrd is a 55 y.o. male with PMH valve replacements x2 on Warfarin, who presented to the ED with chest pain, SOB, and leg pain. His right hip and leg pain started several days ago. It began gradually, with no recollection of trauma whatsoever. It progressed and acutely worsened yesterday to the point that he could no longer bear weight on it so came in. Denies any fevers, chills, nausea, or vomiting. He believes his chest pain and SOB are related to his anxiety surrounding his leg pain. CT A/P showing large retroperitoneal hematoma with no active blush. INR >10. Hgb 12.5 on arrival, baseline appears to be 13-15. ProBNP 792 with elevated troponins (42 > 63). Bili 1.7. No past medical history on file. No past surgical history on file. Medications Prior to Admission:    Prior to Admission medications    Medication Sig Start Date End Date Taking? Authorizing Provider   warfarin (COUMADIN) 2.5 MG tablet Take 1 tablet by mouth daily Monday through Friday   No Dose on 3/27/22 3/29/22   Taras Dunham MD   warfarin (COUMADIN) 5 MG tablet Take 1 tablet by mouth twice a week Saturday AND Sunday 4/2/22   Taras Dunham MD   aspirin 81 MG EC tablet Take 1 tablet by mouth daily 3/27/22   Taras Dunham MD   metoprolol succinate (TOPROL XL) 25 MG extended release tablet Take 25 mg by mouth daily     Historical Provider, MD   ARIPiprazole (ABILIFY) 30 MG tablet Take 30 mg by mouth daily    Historical Provider, MD   furosemide (LASIX) 20 MG tablet Take 20 mg by mouth daily    Historical Provider, MD   rosuvastatin (CRESTOR) 40 MG tablet Take 40 mg by mouth daily    Historical Provider, MD       No Known Allergies    No family history on file.     Social History     Tobacco Use    Smoking status: Current Every Day Smoker     Packs/day: 1.00     Types: Cigarettes    Smokeless tobacco: Never Used   Substance Use Topics    Alcohol use: Yes     Comment: occassional    Drug use: Never     Review of Systems   General ROS: negative  Hematological and Lymphatic ROS: negative  Respiratory ROS: positive for - shortness of breath  Cardiovascular ROS: positive for - chest pain  Gastrointestinal ROS: negative  Genito-Urinary ROS: negative  Musculoskeletal ROS: positive for - muscle pain      PHYSICAL EXAM:    Vitals:    05/12/22 0222   BP: 128/69   Pulse:    Resp:    Temp:    SpO2:        General Appearance:  awake, alert, oriented, in mild distress secondary to pain  Skin:  Skin color, texture, turgor normal. No rashes or lesions. Head/face:  NCAT  Eyes:  No gross abnormalities. Lungs:  Normal respiratory effort on room air  Heart:  Tachycardic, regular rhythm  Abdomen:  Soft, non-distended, mildly tender to palpation RLQ, no rebound or guarding  Extremities: Extremities warm to touch, pink, with no edema. RLE with significant pain associated with active movement, neurovascularly intact  Male Rectal:  Rectal exam deferred. LABS:  CBC  Recent Labs     05/11/22  1435   WBC 10.1   HGB 12.5   HCT 36.9*        BMP  Recent Labs     05/11/22  1435      K 3.2*   CL 96*   CO2 23   BUN 10   CREATININE 1.5*   CALCIUM 7.8*     Liver Function  Recent Labs     05/11/22  1435   BILITOT 1.7*   *   ALT 64*   ALKPHOS 214*   PROT 6.7   LABALBU 3.6     No results for input(s): LACTATE in the last 72 hours. Recent Labs     05/11/22  1435   INR >10.0*       RADIOLOGY  CT ABDOMEN PELVIS W IV CONTRAST Additional Contrast? None    Addendum Date: 5/12/2022    ADDENDUM: These findings were discussed with Dr. Pedro Lane at 2:53 a.m. on 05/12/2022.      Result Date: 5/12/2022  EXAMINATION: CT OF THE ABDOMEN AND PELVIS WITH CONTRAST 5/12/2022 1:41 am TECHNIQUE: CT of the abdomen and pelvis was performed with the administration of intravenous contrast. Multiplanar reformatted images are provided for review. Automated exposure control, iterative reconstruction, and/or weight based adjustment of the mA/kV was utilized to reduce the radiation dose to as low as reasonably achievable. COMPARISON: None. HISTORY: ORDERING SYSTEM PROVIDED HISTORY: Right-sided abdominal pain hip pain TECHNOLOGIST PROVIDED HISTORY: Reason for exam:->Right-sided abdominal pain hip pain Additional Contrast?->None Decision Support Exception - unselect if not a suspected or confirmed emergency medical condition->Emergency Medical Condition (MA) What reading provider will be dictating this exam?->CRC FINDINGS: Lower Chest: The lung bases are clear. There is no pleural or pericardial effusion. Organs: Fatty, enlarged liver. No mass or ductal dilatation. Calcified gallstones are noted, without evidence of acute cholecystitis. The spleen, adrenal glands and pancreas are normal.  Hypodensities involving the kidneys bilaterally, the largest of which is a simple cyst within the left upper pole. The others are too small to characterize. In addition, nonobstructing calculi are noted involving the left kidney. No obstructive uropathy or evidence of a renal mass. GI/Bowel: The small and large bowel, including the appendix are normal.  No bowel obstruction, free air or evidence of acute appendicitis. Pelvis: The pelvic organs are normal.  No mass or lymphadenopathy. There is evidence of high density fluid along the right hemipelvis, adjacent to the bladder, and extending inferiorly towards the right groin. This is consistent with hematoma. Peritoneum/Retroperitoneum: The right iliacus muscle is enlarged and hyperdense when compared to the left, and adjacent fat stranding. Findings are suggestive of an intramuscular hematoma. Fat stranding is seen along the right groin, and extending into the mesenteric fat along the right retroperitoneum. No definite evidence of contrast extravasation to suggest active bleeding.   The right iliacus muscle at its largest measures approximately 4.4 x 8 cm. Bones/Soft Tissues: No lytic or blastic osseous lesions. No soft tissue or vascular abnormality is seen. Chronic left-sided rib fractures are noted. Atherosclerotic calcifications of the abdominal aorta, without evidence of aneurysm. Findings most suggestive of a retroperitoneal hematoma involving the right iliacus muscle, as well as hematoma extending along the right pelvic sidewall, lateral to the bladder. In addition, mesenteric fat stranding it is also noted extending along the right pelvic mesentery. Correlation with serial hemoglobin and hematocrit levels is recommended. Nonobstructing renal calculi, as well as renal cysts. No evidence of a renal mass. Additional findings as above. ASSESSMENT:  55 y.o. male with spontaneous retroperitoneal hematoma in the setting of supratherapeutic INR, not actively bleeding. PLAN:  - continue to trend H/H  - reverse INR as able, agree with IV vitamin K, will order Media Pop as well  - diet as tolerated  - prn pain control  - cardiac workup per primary   - no acute surgical intervention warranted at this time    Discussed with Dr. Edgardo Tavarez.      Electronically signed by Jamal Patton MD on 5/12/22 at 3:55 AM EDT

## 2022-05-12 NOTE — H&P
Hospitalist History & Physical      PCP: No primary care provider on file. Date of Service: Pt seen/examined on 5/12/2022     Chief Complaint:  had concerns including Leg Pain (R leg from knee to hip. pt states he has not been able to walk or bend leg for 2-3days. denies trauma/ injury), Chest Pain (starting today. L sided chest pain radiating to L arm. + nausea. ), Shortness of Breath (nonproductive cough), and Laceration (L index finger 2 days ago that is still bleeding. pt takes warfarin but states he has not been getting levels checked. hx of 2 valve replacements ). History Of Present Illness:    Mr. Ofelia Martínez, a 55y.o. year old male  who  has no past medical history on file. Presented to the emergency department with leg pain, chest pain, shortness of breath. Has not been able to walk or bend his leg for 3 days. Denies any trauma. Chest pain started today is left-sided and radiates to his arm. He is having some nausea. Nonproductive cough. Patient has a history of valve replacement and is on warfarin. Vital signs reveal the patient be tachycardic with a rate of 111. Remainder of his vital signs within normal meds and stable. He is afebrile. Laboratory studies demonstrate potassium 3.2, creatinine 1.5, glucose 128, troponin of 42 with repeat of 63, alk phos 214, , ALT 64. INR greater than 10. Chest x-ray is unremarkable. X-ray of the hip was unremarkable. CT abdomen pelvis shows findings suggestive of retroperitoneal hematoma involving the right iliac us muscle as well as a hematoma extending along the right pelvic sidewall lateral to the bladder. In addition mesenteric fat stranding it is also noted extending along the right pelvic mesentery. Patient was given vitamin K. General surgery was consulted. No past medical history on file. No past surgical history on file. Prior to Admission medications    Medication Sig Start Date End Date Taking?  Authorizing Provider   warfarin (COUMADIN) 2.5 MG tablet Take 1 tablet by mouth daily Monday through Friday   No Dose on 3/27/22 3/29/22   Stefanie Washington MD   warfarin (COUMADIN) 5 MG tablet Take 1 tablet by mouth twice a week Saturday AND Sunday 4/2/22   Stefanie Washington MD   aspirin 81 MG EC tablet Take 1 tablet by mouth daily 3/27/22   Stefanie Washington MD   metoprolol succinate (TOPROL XL) 25 MG extended release tablet Take 25 mg by mouth daily     Historical Provider, MD   ARIPiprazole (ABILIFY) 30 MG tablet Take 30 mg by mouth daily    Historical Provider, MD   furosemide (LASIX) 20 MG tablet Take 20 mg by mouth daily    Historical Provider, MD   rosuvastatin (CRESTOR) 40 MG tablet Take 40 mg by mouth daily    Historical Provider, MD         Allergies:  Patient has no known allergies. Social History:    TOBACCO:   reports that he has been smoking cigarettes. He has been smoking about 1.00 pack per day. He has never used smokeless tobacco.  ETOH:   reports current alcohol use. Family History:    Reviewed in detail and negative for DM, CAD, Cancer, CVA. Positive as follows\"  No family history on file. REVIEW OF SYSTEMS:   Pertinent positives as noted in the HPI. All other systems reviewed and negative. PHYSICAL EXAM:  /69   Pulse 111   Temp 97.3 °F (36.3 °C) (Infrared)   Resp 20   Ht 5' 9\" (1.753 m)   Wt 245 lb (111.1 kg)   SpO2 98%   BMI 36.18 kg/m²   General appearance: No apparent distress, appears stated age and cooperative. HEENT: Normal cephalic, atraumatic without obvious deformity. Pupils equal, round, and reactive to light. Extra ocular muscles intact. Conjunctivae/corneas clear. Neck: Supple, with full range of motion. No jugular venous distention. Trachea midline. Respiratory: Clear to auscultation bilaterally  Cardiovascular: Tachycardic  Abdomen: Soft, TTP RUQ, nondistended  Musculoskeletal: No clubbing, cyanosis, edema of bilateral lower extremities. Brisk capillary refill.   Right lower extremity range of motion limited by pain  Skin: Normal skin color. No rashes or lesions. Neurologic:  Neurovascularly intact without any focal sensory/motor deficits. Cranial nerves: II-XII intact, grossly non-focal.      CBC:   Recent Labs     05/11/22  1435   WBC 10.1   RBC 3.92   HGB 12.5   HCT 36.9*   MCV 94.1   RDW 15.6*        BMP:   Recent Labs     05/11/22  1435      K 3.2*   CL 96*   CO2 23   BUN 10   CREATININE 1.5*     LFT:  Recent Labs     05/11/22  1435   PROT 6.7   ALKPHOS 214*   ALT 64*   *   BILITOT 1.7*     CE:  No results for input(s): Rajiv Shahriar in the last 72 hours. PT/INR:   Recent Labs     05/11/22  1435   INR >10.0*   APTT 93.1*     BNP: No results for input(s): BNP in the last 72 hours. ESR: No results found for: SEDRATE  CRP: No results found for: CRP  D Dimer:   Lab Results   Component Value Date    DDIMER 265 04/05/2022      Folate and B12: No results found for: [id number], No results found for: FOLATE  Lactic Acid: No results found for: LACTA  Thyroid Studies:   Lab Results   Component Value Date    TSH 1.560 03/24/2022       Oupatient labs:  Lab Results   Component Value Date    CHOL 86 03/24/2022    TRIG 149 03/24/2022    HDL 28 03/24/2022    LDLCALC 28 03/24/2022    TSH 1.560 03/24/2022    INR >10.0 (HH) 05/11/2022    LABA1C 5.2 03/24/2022       Urinalysis:    Lab Results   Component Value Date    NITRU Negative 05/12/2022    WBCUA NONE 05/12/2022    BACTERIA NONE SEEN 05/12/2022    RBCUA 1-3 05/12/2022    BLOODU TRACE-INTACT 05/12/2022    SPECGRAV 1.010 05/12/2022    GLUCOSEU Negative 05/12/2022       Imaging:  XR CHEST (2 VW)    Result Date: 5/11/2022  EXAMINATION: TWO XRAY VIEWS OF THE CHEST 5/11/2022 3:32 pm COMPARISON: None. HISTORY: ORDERING SYSTEM PROVIDED HISTORY: cp TECHNOLOGIST PROVIDED HISTORY: Reason for exam:->cp What reading provider will be dictating this exam?->CRC FINDINGS: The cardiac silhouette is within normals.   Postoperative sternotomy changes are noted. There is a dual lead cardiac pacer on the right The right lung is clear There is minimal atelectasis seen within the lingula no left pleural effusion is noted. The left upper lobe is clear. 1. There is no acute cardiopulmonary disease 2. Minimal atelectasis seen within the lingula     CT ABDOMEN PELVIS W IV CONTRAST Additional Contrast? None    Addendum Date: 5/12/2022    ADDENDUM: These findings were discussed with Dr. Olivia Kwong at 2:53 a.m. on 05/12/2022. Result Date: 5/12/2022  EXAMINATION: CT OF THE ABDOMEN AND PELVIS WITH CONTRAST 5/12/2022 1:41 am TECHNIQUE: CT of the abdomen and pelvis was performed with the administration of intravenous contrast. Multiplanar reformatted images are provided for review. Automated exposure control, iterative reconstruction, and/or weight based adjustment of the mA/kV was utilized to reduce the radiation dose to as low as reasonably achievable. COMPARISON: None. HISTORY: ORDERING SYSTEM PROVIDED HISTORY: Right-sided abdominal pain hip pain TECHNOLOGIST PROVIDED HISTORY: Reason for exam:->Right-sided abdominal pain hip pain Additional Contrast?->None Decision Support Exception - unselect if not a suspected or confirmed emergency medical condition->Emergency Medical Condition (MA) What reading provider will be dictating this exam?->CRC FINDINGS: Lower Chest: The lung bases are clear. There is no pleural or pericardial effusion. Organs: Fatty, enlarged liver. No mass or ductal dilatation. Calcified gallstones are noted, without evidence of acute cholecystitis. The spleen, adrenal glands and pancreas are normal.  Hypodensities involving the kidneys bilaterally, the largest of which is a simple cyst within the left upper pole. The others are too small to characterize. In addition, nonobstructing calculi are noted involving the left kidney. No obstructive uropathy or evidence of a renal mass. GI/Bowel:  The small and large bowel, including the appendix are normal.  No bowel obstruction, free air or evidence of acute appendicitis. Pelvis: The pelvic organs are normal.  No mass or lymphadenopathy. There is evidence of high density fluid along the right hemipelvis, adjacent to the bladder, and extending inferiorly towards the right groin. This is consistent with hematoma. Peritoneum/Retroperitoneum: The right iliacus muscle is enlarged and hyperdense when compared to the left, and adjacent fat stranding. Findings are suggestive of an intramuscular hematoma. Fat stranding is seen along the right groin, and extending into the mesenteric fat along the right retroperitoneum. No definite evidence of contrast extravasation to suggest active bleeding. The right iliacus muscle at its largest measures approximately 4.4 x 8 cm. Bones/Soft Tissues: No lytic or blastic osseous lesions. No soft tissue or vascular abnormality is seen. Chronic left-sided rib fractures are noted. Atherosclerotic calcifications of the abdominal aorta, without evidence of aneurysm. Findings most suggestive of a retroperitoneal hematoma involving the right iliacus muscle, as well as hematoma extending along the right pelvic sidewall, lateral to the bladder. In addition, mesenteric fat stranding it is also noted extending along the right pelvic mesentery. Correlation with serial hemoglobin and hematocrit levels is recommended. Nonobstructing renal calculi, as well as renal cysts. No evidence of a renal mass. Additional findings as above. XR HIP 2-3 VW W PELVIS RIGHT    Result Date: 5/11/2022  EXAMINATION: ONE XRAY VIEW OF THE PELVIS AND TWO XRAY VIEWS RIGHT HIP 5/11/2022 3:32 pm COMPARISON: None.  HISTORY: ORDERING SYSTEM PROVIDED HISTORY: pain TECHNOLOGIST PROVIDED HISTORY: Reason for exam:->pain What reading provider will be dictating this exam?->CRC FINDINGS: Single AP pelvis and AP frogleg views of the right hip demonstrate normal alignment without fracture or subluxation. There are surgical clips overlying the left hemipelvis. There is no evidence of degenerative change. Unremarkable AP pelvis and the right hip without fracture or subluxation. ASSESSMENT:  -Retroperitoneal hemorrhage  -Supratherapeutic INR  -Elevated troponin  -Hypokalemia  -CKD stage III  -Chronically anticoagulated on warfarin  -Hypertension  -Hyperlipidemia      PLAN:  -Admit to medicine  -Consult general surgery  -N.p.o. now  -Monitor INR levels  -Monitor hemoglobin levels  -Monitor serum electrolytes replete as needed  -Telemetry  -Repeat troponin  -Lactated Ringer's at 75 mL/hr  -Continue home medications        Diet: No diet orders on file  Code Status: Prior  Surrogate decision maker confirmed with patient:   Extended Emergency Contact Information  Primary Emergency Contact: rao meltonyce  Home Phone: [phone]  Mobile Phone: [phone]  Relation: Other  Preferred language: English   needed? No    DVT Prophylaxis: []Lovenox []Heparin []PCD [] [address] []Encouraged ambulation  Disposition: []Med/Surg [] Intermediate [] ICU/CCU  Admit status: [] Observation [] Inpatient     +++++++++++++++++++++++++++++++++++++++++++++++++  Teresa Giraldo, DO  +++++++++++++++++++++++++++++++++++++++++++++++++  NOTE: This report was transcribed using voice recognition software. Every effort was made to ensure accuracy; however, inadvertent computerized transcription errors may be present.

## 2022-05-12 NOTE — ED PROVIDER NOTES
HPI:  5/12/22, Time: 12:44 AM EDT         Newell Koyanagi is a 55 y.o. male presenting to the ED for leg pain chest pain shortness of breath, beginning several days ago. The complaint has been intermittent, moderate in severity, and worsened by nothing. Patient presenting here mainly because of right hip and leg pain he reports numbness to the anterior aspect of his right thigh its been going on for last 2 days. Patient reports no fall or injury. Patient reporting no headache he reports intermittent left-sided chest pain that has been going into his shoulder. Patient also reports shortness of breath with this. Patient reporting some right lower abdominal pain. Patient reporting no fever. Does report some cough. Patient reporting no left-sided pain. He reports no back pain. Patient reports also having dark stools at home. Patient reporting laceration to his left index finger. Patient reports that occurred several days ago. ROS:   Pertinent positives and negatives are stated within HPI, all other systems reviewed and are negative.  --------------------------------------------- PAST HISTORY ---------------------------------------------  Past Medical History:  has no past medical history on file. Past Surgical History:  has no past surgical history on file. Social History:  reports that he has been smoking cigarettes. He has been smoking about 1.00 pack per day. He has never used smokeless tobacco. He reports current alcohol use. He reports that he does not use drugs. Family History: family history is not on file. The patients home medications have been reviewed. Allergies: Patient has no known allergies.     ---------------------------------------------------PHYSICAL EXAM--------------------------------------  \  Constitutional/General: Alert and oriented x3, well appearing, non toxic in NAD  Head: Normocephalic and atraumatic  Eyes: PERRL, EOMI  Mouth: Oropharynx clear, handling secretions, no trismus  Neck: Supple, full ROM, non tender to palpation in the midline, no stridor, no crepitus, no meningeal signs  Pulmonary: Lungs clear to auscultation bilaterally, no wheezes, rales, or rhonchi. Not in respiratory distress  Cardiovascular:  Regular rate. Regular rhythm. No murmurs, gallops, or rubs. 2+ distal pulses  Chest: no chest wall tenderness  Abdomen: Soft. Tenderness to right lower abdomen. Non distended. +BS. No rebound, guarding, or rigidity. No pulsatile masses appreciated. Musculoskeletal: Moves all extremities tenderness to right hip. Patient range of motion limited secondary to pain but able to raise leg slightly and bend the knee. Patient pulses are intact distally. . Warm and well perfused, no clubbing, cyanosis, or edema. Capillary refill <3 seconds pulses intact distally  Skin: warm and dry. No rashes. Noted wound to the left distal aspect of the index finger  Neurologic: GCS 15, CN 2-12 grossly intact, no focal deficits, symmetric strength 5/5 in the upper and lower extremities bilaterally  Psych: Normal Affect    -------------------------------------------------- RESULTS -------------------------------------------------  I have personally reviewed all laboratory and imaging results for this patient. Results are listed below.      LABS:  Results for orders placed or performed during the hospital encounter of 05/12/22   CBC with Auto Differential   Result Value Ref Range    WBC 10.1 4.5 - 11.5 E9/L    RBC 3.92 3.80 - 5.80 E12/L    Hemoglobin 12.5 12.5 - 16.5 g/dL    Hematocrit 36.9 (L) 37.0 - 54.0 %    MCV 94.1 80.0 - 99.9 fL    MCH 31.9 26.0 - 35.0 pg    MCHC 33.9 32.0 - 34.5 %    RDW 15.6 (H) 11.5 - 15.0 fL    Platelets 760 039 - 861 E9/L    MPV 11.0 7.0 - 12.0 fL    Neutrophils % 80.7 (H) 43.0 - 80.0 %    Immature Granulocytes % 0.5 0.0 - 5.0 %    Lymphocytes % 6.7 (L) 20.0 - 42.0 %    Monocytes % 10.5 2.0 - 12.0 %    Eosinophils % 1.0 0.0 - 6.0 %    Basophils % 0.6 0.0 - 2.0 %    Neutrophils Absolute 8.18 (H) 1.80 - 7.30 E9/L    Immature Granulocytes # 0.05 E9/L    Lymphocytes Absolute 0.68 (L) 1.50 - 4.00 E9/L    Monocytes Absolute 1.06 (H) 0.10 - 0.95 E9/L    Eosinophils Absolute 0.10 0.05 - 0.50 E9/L    Basophils Absolute 0.06 0.00 - 0.20 E9/L   Comprehensive Metabolic Panel   Result Value Ref Range    Sodium 135 132 - 146 mmol/L    Potassium 3.2 (L) 3.5 - 5.0 mmol/L    Chloride 96 (L) 98 - 107 mmol/L    CO2 23 22 - 29 mmol/L    Anion Gap 16 7 - 16 mmol/L    Glucose 128 (H) 74 - 99 mg/dL    BUN 10 6 - 20 mg/dL    CREATININE 1.5 (H) 0.7 - 1.2 mg/dL    GFR Non-African American 48 >=60 mL/min/1.73    GFR African American 59     Calcium 7.8 (L) 8.6 - 10.2 mg/dL    Total Protein 6.7 6.4 - 8.3 g/dL    Albumin 3.6 3.5 - 5.2 g/dL    Total Bilirubin 1.7 (H) 0.0 - 1.2 mg/dL    Alkaline Phosphatase 214 (H) 40 - 129 U/L    ALT 64 (H) 0 - 40 U/L     (H) 0 - 39 U/L   Troponin   Result Value Ref Range    Troponin, High Sensitivity 42 (H) 0 - 11 ng/L   Brain Natriuretic Peptide   Result Value Ref Range    Pro- (H) 0 - 125 pg/mL   Protime-INR   Result Value Ref Range    Protime >120.0 (H) 9.3 - 12.4 sec    INR >10.0 (HH)    APTT   Result Value Ref Range    aPTT 93.1 (H) 24.5 - 35.1 sec   Troponin   Result Value Ref Range    Troponin, High Sensitivity 42 (H) 0 - 11 ng/L   Troponin   Result Value Ref Range    Troponin, High Sensitivity 63 (H) 0 - 11 ng/L   Urinalysis   Result Value Ref Range    Color, UA Yellow Straw/Yellow    Clarity, UA Clear Clear    Glucose, Ur Negative Negative mg/dL    Bilirubin Urine Negative Negative    Ketones, Urine Negative Negative mg/dL    Specific Gravity, UA 1.010 1.005 - 1.030    Blood, Urine TRACE-INTACT Negative    pH, UA 7.5 5.0 - 9.0    Protein,  (A) Negative mg/dL    Urobilinogen, Urine 4.0 (A) <2.0 E.U./dL    Nitrite, Urine Negative Negative    Leukocyte Esterase, Urine Negative Negative   Microscopic Urinalysis   Result Value Ref  5' 9\" (1.753 m)   Wt 245 lb (111.1 kg)   SpO2 98%   BMI 36.18 kg/m²   Oxygen Saturation Interpretation: Normal    The patients available past medical records and past encounters were reviewed.         ------------------------------ ED COURSE/MEDICAL DECISION MAKING----------------------  Medications   ondansetron (ZOFRAN) injection 4 mg (has no administration in time range)   phytonadione (ADULT) (VITAMIN K) 5 mg in dextrose 5 % 100 mL IVPB (has no administration in time range)   potassium chloride 10 mEq/100 mL IVPB (Peripheral Line) (has no administration in time range)   ARIPiprazole (ABILIFY) tablet 30 mg (has no administration in time range)   metoprolol succinate (TOPROL XL) extended release tablet 25 mg (has no administration in time range)   rosuvastatin (CRESTOR) tablet 40 mg (has no administration in time range)   sodium chloride flush 0.9 % injection 10 mL (has no administration in time range)   sodium chloride flush 0.9 % injection 10 mL (has no administration in time range)   0.9 % sodium chloride infusion (has no administration in time range)   promethazine (PHENERGAN) tablet 12.5 mg (has no administration in time range)     Or   ondansetron (ZOFRAN) injection 4 mg (has no administration in time range)   polyethylene glycol (GLYCOLAX) packet 17 g (has no administration in time range)   acetaminophen (TYLENOL) tablet 650 mg (has no administration in time range)     Or   acetaminophen (TYLENOL) suppository 650 mg (has no administration in time range)   lactated ringers infusion (has no administration in time range)   morphine sulfate (PF) injection 4 mg (has no administration in time range)   morphine (PF) injection 2 mg (2 mg IntraVENous Given 5/12/22 0114)   iopamidol (ISOVUE-370) 76 % injection 90 mL (90 mLs IntraVENous Given 5/12/22 0149)   sodium chloride flush 0.9 % injection 10 mL (10 mLs IntraVENous Given 5/12/22 0149)   fentaNYL (SUBLIMAZE) injection 25 mcg (25 mcg IntraVENous Given 5/12/22 0254)             Medical Decision Making:    Patient presenting here because of multiple complaints of which she is complaining of right hip pain and thigh pain. Patient also reporting intermittent pains in his chest and shortness of breath. Patient reporting difficulty ambulating due to this pain in his right leg. Patient has pulses distally range of motion of his hip is limited secondary to pain. Labs and CT noted reviewed. Patient does have retroperitoneal hematoma. Pulses intact distally. Patient made aware of findings and plan patient was medicated for pain he was also given vitamin K for his coagulopathy. Will be admitted to monitored bed. Re-Evaluations:             Patient eval multiple times. Patient still having pain medicated for his pain. Patient given vitamin K. Patient vital signs stable. Patient will be admitted to monitored bed. Patient was made of CT results. Consultations:             l medicine consulted and general surgery consulted    Critical Care: This patient's ED course included: a personal history and physicial eaxmination    This patient has been closely monitored during their ED course. Counseling: The emergency provider has spoken with the patient and discussed todays results, in addition to providing specific details for the plan of care and counseling regarding the diagnosis and prognosis. Questions are answered at this time and they are agreeable with the plan.       --------------------------------- IMPRESSION AND DISPOSITION ---------------------------------    IMPRESSION  1. Chest pain, unspecified type    2. Right leg pain    3. Abdominal pain, unspecified abdominal location    4. Warfarin-induced coagulopathy ([facility] [address]    5. Retroperitoneal hematoma        DISPOSITION  Disposition: Admit to monitored bed  Patient condition is stable        NOTE: This report was transcribed using voice recognition software.  Every effort was made to ensure accuracy; however, inadvertent computerized transcription errors may be present          Luís Fernandez MD  05/12/22 [id number]

## 2022-05-12 NOTE — CONSENT
Informed Consent for Blood Component Transfusion Note    I have discussed with the patient the rationale for blood component transfusion; its benefits in treating or preventing fatigue, organ damage, or death; and its risk which includes mild transfusion reactions, rare risk of blood borne infection, or more serious but rare reactions. I have discussed the alternatives to transfusion, including the risk and consequences of not receiving transfusion. The patient had an opportunity to ask questions and had agreed to proceed with transfusion of blood components.     Electronically signed by Theo Samano MD on 5/12/22 at 3:03 PM EDT

## 2022-05-12 NOTE — CONSULTS
enlarged and hyperdense suggesting an intramuscular hematoma but no active bleeding, calcifications of the abdominal aorta but no aneurysm and nonobstructing renal calculi    Troponins are 42-42-63 with a BUN and creatinine of 10 and 1.5 (baseline 1.3-1.9) on admission and now the creatinine is 2.4, , potassium initially 3.2 and today 3.4, hepatic transaminases elevated on admission with an ALT of 64 and an AST of 115, bilirubin 1.7, WBCs 10.1 and H&H 12.5 and 36.9, INR greater than 10 and now 6.8, occult blood stool is positive    General surgery was consulted. INR was reversed with vitamin K and Kcentra and no acute surgical intervention was warranted. Past Medical History:    1. 35 pack year smoker  2. HTN  3. HLD  4. 1988 Testicular carcinoma  S/p chemotherapy and surgery ([address])  5. 2015 CVA  6. 2015 Stent \"L neck\" after CVA [facility] in [facility]  7. 9/10/2022 [facility]: Severe MR with restricted A1, A2, P2 scallops. 2 regurgiant jets. Mild functional MS. Mildly elevated gradient most likely due to mR. Valve arewa 4.1 cm. Moderate to severe AI. NORA 1.5 cm. Small, highly mobile artheroma is seen in proximal descending thoracic aorta. EF 55-60%. 8. 9/11/2020 Bilateral carotid Dopplers: 1-39% bilateral carotid stenosis  [facility]. LCx posterolateral extension 70% stenosis  10. 9/14/2020 @  LIMA-LAD, AVR (#23 [employer] bileaflet mechanical)  and MVR (  #29 [employer] bileaflet mechanical valve)  11. 9[facility] with coumadin INR 2.5-3.5  12. 9/17/2020 Post-op TTE: EF 55-60%. Abnormal septal motion consistent with postoperative status. DD. Bileaflet mechanical AV present  13. 9/18/2020 Developed CHB post-op EP was consulted  15. 9/22/2020 Medtronic Dual Chamber PPM inserted (). 15. 9/26/2020 Discharge from [facility] on ASA 81 mg QD, coumadin, Lipitor 80 mg QD, lopressor 25 mg BID,   16. 11/5/2020 [facility]: EF 55-60%.  Abnormal septal motion consistent with post-operative status. #29 [employer] bileaflet mechanical valve. MV gradient 5.4 mmHg and trival prosthetic MR. Mildly elevated RVSP (35.3 mmHg). Mild to moderate TR. #23 [employer] bileaflet mechanical aortic valve with gradients 21.9/12.3 mmHg and DI of 0.53 and trivial AI. When compared to previous TTE AV gradients are similar. No MV gradients were obtained on previous TTE. [facility] admission 2022 with chest pain and elevated troponin, no acute coronary syndrome and patient was in FRANTZ, troponins 61-54-40  18. Noncompliance with Coumadin, patient has been off medication for a month during the above admission  19. Right bundle branch block and left anterior fascicular block       Past Surgical History:  L humerus fracture requiring surgical repair with surgical plate (resulting limited ROM),  Bilateral hand surgery, testicular carcinoma with surgical removal of testicle.      Allergies:  NKDA        Social History:     smoker 1 pack/day  ETOH:  Admits to 2-4 beers a day  Illicit Drugs  Activity: Live with friends in 2 story home (planning on getting apartment with finance). Do not have a car. Disability for heart surgery. NOK daughter Caty Staggers (MMLIITE). No assistive devices  Code Status: Full Code        Family History: Mother  age 70 MI. No reported PCI/CABG. No reported PPM/ICD. No DM  Biological Father unknown arias history  No full siblings      Medications Prior to admit:  Prior to Admission medications    Medication Sig Start Date End Date Taking?  Authorizing Provider   warfarin (COUMADIN) 2.5 MG tablet Take 1 tablet by mouth daily Monday through Friday   No Dose on 3/27/22  Patient taking differently: Take 2.5 mg by mouth daily Monday through Friday 2.5 mg and sat and sun 5 mg 3/29/22   Mohamud Slade MD   warfarin (COUMADIN) 5 MG tablet Take 1 tablet by mouth twice a week Saturday AND 22   Mohamud Slade MD   aspirin 81 MG EC tablet Take 1 tablet by mouth daily 3/27/22 Colt Cerna MD   metoprolol succinate (TOPROL XL) 25 MG extended release tablet Take 25 mg by mouth daily     Historical Provider, MD   ARIPiprazole (ABILIFY) 30 MG tablet Take 30 mg by mouth daily    Historical Provider, MD   furosemide (LASIX) 20 MG tablet Take 20 mg by mouth daily    Historical Provider, MD   rosuvastatin (CRESTOR) 40 MG tablet Take 40 mg by mouth daily    Historical Provider, MD       Current Medications:    Current Facility-Administered Medications: ARIPiprazole (ABILIFY) tablet 30 mg, 30 mg, Oral, Daily  metoprolol succinate (TOPROL XL) extended release tablet 25 mg, 25 mg, Oral, Daily  rosuvastatin (CRESTOR) tablet 40 mg, 40 mg, Oral, Daily  sodium chloride flush 0.9 % injection 10 mL, 10 mL, IntraVENous, 2 times per day  sodium chloride flush 0.9 % injection 10 mL, 10 mL, IntraVENous, PRN  0.9 % sodium chloride infusion, , IntraVENous, PRN  promethazine (PHENERGAN) tablet 12.5 mg, 12.5 mg, Oral, Q6H PRN **OR** [DISCONTINUED] ondansetron (ZOFRAN) injection 4 mg, 4 mg, IntraVENous, Q6H PRN  polyethylene glycol (GLYCOLAX) packet 17 g, 17 g, Oral, Daily PRN  acetaminophen (TYLENOL) tablet 650 mg, 650 mg, Oral, Q6H PRN **OR** acetaminophen (TYLENOL) suppository 650 mg, 650 mg, Rectal, Q6H PRN  lactated ringers infusion, , IntraVENous, Continuous  oxyCODONE (ROXICODONE) immediate release tablet 5 mg, 5 mg, Oral, Q4H PRN **OR** oxyCODONE HCl (OXY-IR) immediate release tablet 10 mg, 10 mg, Oral, Q4H PRN  morphine (PF) injection 4 mg, 4 mg, IntraVENous, Q4H PRN      Family History: Noncontributory  No family history on file. REVIEW OF SYSTEMS:     · Constitutional: Denies fatigue, fevers, chills or night sweats  · Eyes: Denies visual changes or drainage  · ENT: Denies headaches or hearing loss. No mouth sores or sore throat. No epistaxis   · Cardiovascular: Denies chest pain, pressure or palpitations. No lower extremity swelling. · Respiratory: Denies PATEL, cough, orthopnea or PND.  No hemoptysis   · Gastrointestinal: Denies hematemesis or anorexia. No hematochezia or melena    · Genitourinary: Denies urgency, dysuria or hematuria. · Musculoskeletal: Denies gait disturbance, weakness or joint complaints  · Integumentary: Denies rash, hives or pruritis   · Neurological: Denies dizziness, headaches or seizures. No numbness or tingling  · Psychiatric: Denies anxiety or depression. · Endocrine: Denies temperature intolerance. No recent weight change. .  · Hematologic/Lymphatic: Denies abnormal bruising or bleeding. No swollen lymph nodes    PHYSICAL EXAM:   /75   Pulse 114   Temp 98.2 °F (36.8 °C) (Oral)   Resp 20   Ht 5' 9\" (1.753 m)   Wt 245 lb (111.1 kg)   SpO2 100%   BMI 36.18 kg/m²   CONST:  Well developed, well nourished who appears of stated age. Awake, alert and cooperative. No apparent distress. HEENT:   Head- Normocephalic, atraumatic   Eyes- Conjunctivae pink, anicteric  Throat- Oral mucosa pink and moist  Neck-  No stridor, trachea midline, no jugular venous distention. No carotid bruit. CHEST: Chest symmetrical and non-tender to palpation. No accessory muscle use or intercostal retractions, well-healed mid sternotomy scar. Pacemaker site in right upper chest unremarkable  RESPIRATORY: Lung sounds - clear throughout fields   CARDIOVASCULAR:     Heart Inspection- shows no noted pulsations  Heart Palpation- no heaves or thrills; PMI is non-displaced   Heart Ausculation- Regular rate and rhythm, no murmur. No s3, s4 or rub   PV: No lower extremity edema. No varicosities. Pedal pulses palpable, no clubbing or cyanosis   ABDOMEN: Soft, non-tender to light palpation. Bowel sounds present. No palpable masses no organomegaly; no abdominal bruit  MS: Good muscle strength and tone right upper extremity but unable to fully range of motion on left upper extremity. No atrophy or abnormal movements.    : Deferred  SKIN: Warm and dry no statis dermatitis or ulcers   NEURO / PSYCH: Oriented to person, place and time. Speech clear and appropriate. Follows all commands. Pleasant affect     DATA:    ECG / Tele strips: Sinus tachycardia  Diagnostic:      Intake/Output Summary (Last 24 hours) at 5/12/2022 0852  Last data filed at 5/12/2022 0654  Gross per 24 hour   Intake 560 ml   Output --   Net 560 ml       Labs:   CBC:   Recent Labs     05/11/22  1435 05/12/22 0521   WBC 10.1 9.3   HGB 12.5 11.0*   HCT 36.9* 33.6*    140     BMP:   Recent Labs     05/11/22  1435 05/12/22 0521    134   K 3.2* 3.4*   CO2 23 24   BUN 10 18   CREATININE 1.5* 2.4*   LABGLOM 48 28   CALCIUM 7.8* 7.5*     Mag:   Recent Labs     05/12/22 0521   MG 1.0*     Phos: No results for input(s): PHOS in the last 72 hours. TFT:   Lab Results   Component Value Date    TSH 1.560 03/24/2022    T4FREE 1.01 03/24/2022      HgA1c:   Lab Results   Component Value Date    LABA1C 5.2 03/24/2022     No results found for: EAG  proBNP:   Recent Labs     05/11/22  1435   PROBNP 792*     PT/INR:   Recent Labs     05/11/22 1435 05/12/22 0521   PROTIME >120.0* 76.6*   INR >10.0* 6.8*     APTT:  Recent Labs     05/11/22 1435   APTT 93.1*     CARDIAC ENZYMES:  Recent Labs     05/11/22  1435 05/11/22  1630 05/12/22  0059   TROPHS 42* 42* 63*     FASTING LIPID PANEL:  Lab Results   Component Value Date    CHOL 86 03/24/2022    HDL 28 03/24/2022    LDLCALC 28 03/24/2022    TRIG 149 03/24/2022     LIVER PROFILE:  Recent Labs     05/11/22 1435 05/12/22 0521   * 74*   ALT 64* 52*   LABALBU 3.6 3.3*     ASSESSMENT: Discussed with Dr. Cierra Mata  1. Retroperitoneal hematoma in the setting of an elevated INR  2. Elevated Troponin with atypical CP. Troponin elevation not consistent with ACS. Probably due to FRANTZ. 3. FRANTZ on CKD  4. Status post CABG x1 with a LIMA to the LAD in 2020  5. Status post mechanical AVR and MVR in 2020 and on chronic Coumadin therapy with a goal INR of 3-3.5  6. Hemoccult positive stool  7.  Electrolyte imbalance with a potassium of 3.4 magnesium 1  8. Elevated hepatic transaminases  9. CHB s/p Medtronic PPM 2020  10. HTN  11. HLD  12. Obesity  13. Ongoing tobacco abuse/possible alcohol abuse  14. Limited ROM of L arm/shoulder secondary to humerus fracture  15. Right bundle branch block and left anterior fascicular block    Plans  1. Interrogate pacemaker  2. Keep INR 3-3.5 and resume Coumadin when able according to general surgery  3. Resume aspirin  4. Agree with gentle hydration  5. Continue statin  6. Patient counseled just avoid alcohol     Electronically signed by Fanny Schlatter, APRN - CNP on 5/12/2022 at 8:52 AM     Patient chart reviewed with Fanny Schlatter, APRN - CNP. Patient seen and examined independently. Assessment and plan were made in collaboration with Fanny Schlatter, APRN - CNP. 52-year-old obese chronic smoker male seen in consultation due to chest pain. He is known to me from prior admission in March of this year after a fall. He has a history of hypertension, hyperlipidemia, testicular cancer, status post CVA in 2015, CABG, status post LIMA to LAD, and mechanical aortic and mitral valve replacement, is post pacemaker in September 2020, and probable obstructive sleep apnea. Patient was admitted due to right leg pain, chest pain and dark stools. CT of the abdomen pelvis revealed the retroperitoneal bleed. His INR was elevated at 10. He was seen by surgery and was given vitamin K. His physical exam is pertinent for:  Blood pressure 111/73 heart rate 114/min on admission. Middle-age obese male sleeping but arousable. No carotid bruit and no jugular venous distention. Regular heart with mechanical click present. Decreased air entry bilaterally. Obese nontender abdomen with no abdominal bruit. Trivial right pedal edema. EKG revealed sinus tachycardia at 102 bpm, bifascicular block and prolonged QTC at 523 ms. Labs:  High sensitivity troponin 42, 42 and 63.   Hematocrit 33.6, hemoglobin 11. Potassium 3.4. Creatinine 1.5 on admission and 2.4 today. INR above 10 on admission and 6.8 today. proBNP 792.  on admission and 74 today. ALT 64 admission and 52 today. ASSESSMENT: Discussed with Dr. Alexandria Michael  - Retroperitoneal hematoma in the setting of an elevated INR  - Elevated Troponin with atypical CP.  Troponin elevation not consistent with ACS. Probably due to FRANTZ, anemia and sinus tachycardia. - FRANTZ on CKD  - Status post CABG x1 with a LIMA to the LAD in 2020  - Status post mechanical AVR and MVR in 2020 and on chronic Coumadin therapy with a goal INR of 3-3.5  - Hemoccult positive stool  - Electrolyte imbalance with a potassium of 3.4 magnesium 1  - Elevated hepatic transaminases  - CHB s/p Medtronic PPM 2020  - HTN  - HLD  - Obesity with probable obstructive sleep apnea. - Ongoing tobacco abuse/possible alcohol abuse  - Limited ROM of L arm/shoulder secondary to humerus fracture  - Right bundle branch block and left anterior fascicular block     Plan:  1. Keep INR 3-3.5 due to mechanical mitral and aortic valve and resume Coumadin when able according to general surgery  2. Agree with gentle hydration and follow-up kidney function electrolytes. 3. Obtain a renal consult in a.m. if further kidney injury. 4. Continue statin  5. Patient counseled to avoid alcohol and smoking cessation. 6. Sleep study as outpatient. 7. No further cardiac testing is needed at this time. 8. Cardiology will sign off. May call if needed. 9. May follow-up in my office 4 weeks after hospital discharge. Thank you for allowing me to share in the care of this patient.

## 2022-05-12 NOTE — PROGRESS NOTES
Patient was seen and examined at bedside  H&P written by my colleague earlier this morning. Patient does not mention any shortness of breath at this time but does mention pain mild chest pain in her hands right groin and medial thigh. Reviewed labs with him along with the Coumadin use. Patient mentions that he has been compliant with his Coumadin medication though he has not been eating much lately in the last 5 days due to decreased appetite. Physical examination  General appearance-obese, appears stated age  Respiratory-clear to auscultation bilaterally no added sounds  Cardiovascular-S1-S2 heard no murmurs  Abdomen-soft nontender, nondistended, bowel sounds heard  Extremities-right lower extremity medial aspect shows swelling, nonpitting nonerythematous nontender extending until the knee. Numbness present at the distal aspect.   Left lower extremity within normal limits  Skin-normal skin color no rashes or lesions noted  Neurologic-no focal deficits other than numbness in the right lower extremity as mentioned above    Vitals:    05/12/22 0400 05/12/22 0549 05/12/22 0600 05/12/22 0758   BP: (!) 157/92 120/77  121/75   Pulse: 107  110 114   Resp: 21  18 20   Temp:    98.2 °F (36.8 °C)   TempSrc:    Oral   SpO2: 96%  98% 100%   Weight:       Height:         ASSESSMENT:  -Retroperitoneal hemorrhage  -Supratherapeutic INR  -Elevated troponin  -Hypokalemia  -Hypomagnesemia  -CKD stage III  -Chronically anticoagulated on warfarin  -Hypertension  -Hyperlipidemia        PLAN:  -Consult general surgery-no active surgical intervention at this time, agree with IV vitamin K, elian Bellamy as well  -N.p.o. has been changed to cardiac diet  -Monitor INR levels-improved to 6.8, will continue to monitor  -Monitor hemoglobin levels  -Monitor serum electrolytes replete as needed  -Telemetry  -Repeat troponin trending and elevated- consulted   -Lactated Ringer's at 75 mL/hr  -Continue home medications      Aaln Jenna Pickens MD

## 2022-05-13 LAB
ALBUMIN SERPL-MCNC: 3 G/DL (ref 3.5–5.2)
ALP BLD-CCNC: 161 U/L (ref 40–129)
ALT SERPL-CCNC: 37 U/L (ref 0–40)
ANION GAP SERPL CALCULATED.3IONS-SCNC: 13 MMOL/L (ref 7–16)
AST SERPL-CCNC: 48 U/L (ref 0–39)
BASOPHILS ABSOLUTE: 0.07 E9/L (ref 0–0.2)
BASOPHILS RELATIVE PERCENT: 0.9 % (ref 0–2)
BILIRUB SERPL-MCNC: 1 MG/DL (ref 0–1.2)
BUN BLDV-MCNC: 26 MG/DL (ref 6–20)
CALCIUM SERPL-MCNC: 7.7 MG/DL (ref 8.6–10.2)
CHLORIDE BLD-SCNC: 96 MMOL/L (ref 98–107)
CO2: 26 MMOL/L (ref 22–29)
CREAT SERPL-MCNC: 2 MG/DL (ref 0.7–1.2)
EOSINOPHILS ABSOLUTE: 0.53 E9/L (ref 0.05–0.5)
EOSINOPHILS RELATIVE PERCENT: 6.7 % (ref 0–6)
GFR AFRICAN AMERICAN: 42
GFR NON-AFRICAN AMERICAN: 35 ML/MIN/1.73
GLUCOSE BLD-MCNC: 119 MG/DL (ref 74–99)
HCT VFR BLD CALC: 29.6 % (ref 37–54)
HEMOGLOBIN: 9.6 G/DL (ref 12.5–16.5)
IMMATURE GRANULOCYTES #: 0.04 E9/L
IMMATURE GRANULOCYTES %: 0.5 % (ref 0–5)
INR BLD: 1.4
LYMPHOCYTES ABSOLUTE: 1.13 E9/L (ref 1.5–4)
LYMPHOCYTES RELATIVE PERCENT: 14.4 % (ref 20–42)
MAGNESIUM: 1.8 MG/DL (ref 1.6–2.6)
MCH RBC QN AUTO: 32.2 PG (ref 26–35)
MCHC RBC AUTO-ENTMCNC: 32.4 % (ref 32–34.5)
MCV RBC AUTO: 99.3 FL (ref 80–99.9)
MONOCYTES ABSOLUTE: 0.78 E9/L (ref 0.1–0.95)
MONOCYTES RELATIVE PERCENT: 9.9 % (ref 2–12)
NEUTROPHILS ABSOLUTE: 5.32 E9/L (ref 1.8–7.3)
NEUTROPHILS RELATIVE PERCENT: 67.6 % (ref 43–80)
PDW BLD-RTO: 16.1 FL (ref 11.5–15)
PLATELET # BLD: 116 E9/L (ref 130–450)
PMV BLD AUTO: 10.8 FL (ref 7–12)
POTASSIUM REFLEX MAGNESIUM: 3.5 MMOL/L (ref 3.5–5)
PROTHROMBIN TIME: 14.7 SEC (ref 9.3–12.4)
RBC # BLD: 2.98 E12/L (ref 3.8–5.8)
SODIUM BLD-SCNC: 135 MMOL/L (ref 132–146)
TOTAL PROTEIN: 5.6 G/DL (ref 6.4–8.3)
WBC # BLD: 7.9 E9/L (ref 4.5–11.5)

## 2022-05-13 PROCEDURE — 36415 COLL VENOUS BLD VENIPUNCTURE: CPT

## 2022-05-13 PROCEDURE — 6370000000 HC RX 637 (ALT 250 FOR IP): Performed by: FAMILY MEDICINE

## 2022-05-13 PROCEDURE — 99232 SBSQ HOSP IP/OBS MODERATE 35: CPT | Performed by: SURGERY

## 2022-05-13 PROCEDURE — 80053 COMPREHEN METABOLIC PANEL: CPT

## 2022-05-13 PROCEDURE — 6360000002 HC RX W HCPCS: Performed by: FAMILY MEDICINE

## 2022-05-13 PROCEDURE — 83735 ASSAY OF MAGNESIUM: CPT

## 2022-05-13 PROCEDURE — 85025 COMPLETE CBC W/AUTO DIFF WBC: CPT

## 2022-05-13 PROCEDURE — 6370000000 HC RX 637 (ALT 250 FOR IP): Performed by: SURGERY

## 2022-05-13 PROCEDURE — 2580000003 HC RX 258: Performed by: FAMILY MEDICINE

## 2022-05-13 PROCEDURE — 6370000000 HC RX 637 (ALT 250 FOR IP): Performed by: STUDENT IN AN ORGANIZED HEALTH CARE EDUCATION/TRAINING PROGRAM

## 2022-05-13 PROCEDURE — 6370000000 HC RX 637 (ALT 250 FOR IP): Performed by: NURSE PRACTITIONER

## 2022-05-13 PROCEDURE — 2140000000 HC CCU INTERMEDIATE R&B

## 2022-05-13 PROCEDURE — 85610 PROTHROMBIN TIME: CPT

## 2022-05-13 RX ADMIN — SODIUM CHLORIDE, PRESERVATIVE FREE 10 ML: 5 INJECTION INTRAVENOUS at 08:19

## 2022-05-13 RX ADMIN — GABAPENTIN 100 MG: 100 CAPSULE ORAL at 13:44

## 2022-05-13 RX ADMIN — OXYCODONE HYDROCHLORIDE 10 MG: 10 TABLET ORAL at 00:27

## 2022-05-13 RX ADMIN — MORPHINE SULFATE 4 MG: 2 INJECTION, SOLUTION INTRAMUSCULAR; INTRAVENOUS at 22:47

## 2022-05-13 RX ADMIN — ASPIRIN 81 MG: 81 TABLET, COATED ORAL at 10:03

## 2022-05-13 RX ADMIN — SODIUM CHLORIDE, POTASSIUM CHLORIDE, SODIUM LACTATE AND CALCIUM CHLORIDE: 600; 310; 30; 20 INJECTION, SOLUTION INTRAVENOUS at 19:01

## 2022-05-13 RX ADMIN — MORPHINE SULFATE 4 MG: 2 INJECTION, SOLUTION INTRAMUSCULAR; INTRAVENOUS at 08:17

## 2022-05-13 RX ADMIN — METOPROLOL SUCCINATE 25 MG: 25 TABLET, EXTENDED RELEASE ORAL at 10:03

## 2022-05-13 RX ADMIN — MORPHINE SULFATE 4 MG: 2 INJECTION, SOLUTION INTRAMUSCULAR; INTRAVENOUS at 02:53

## 2022-05-13 RX ADMIN — OXYCODONE HYDROCHLORIDE 10 MG: 10 TABLET ORAL at 19:01

## 2022-05-13 RX ADMIN — GABAPENTIN 100 MG: 100 CAPSULE ORAL at 10:03

## 2022-05-13 RX ADMIN — METHOCARBAMOL 750 MG: 750 TABLET ORAL at 10:02

## 2022-05-13 RX ADMIN — SODIUM CHLORIDE, POTASSIUM CHLORIDE, SODIUM LACTATE AND CALCIUM CHLORIDE: 600; 310; 30; 20 INJECTION, SOLUTION INTRAVENOUS at 19:39

## 2022-05-13 RX ADMIN — METHOCARBAMOL 750 MG: 750 TABLET ORAL at 16:00

## 2022-05-13 RX ADMIN — MORPHINE SULFATE 4 MG: 2 INJECTION, SOLUTION INTRAMUSCULAR; INTRAVENOUS at 16:37

## 2022-05-13 RX ADMIN — MORPHINE SULFATE 4 MG: 2 INJECTION, SOLUTION INTRAMUSCULAR; INTRAVENOUS at 12:27

## 2022-05-13 RX ADMIN — METHOCARBAMOL 750 MG: 750 TABLET ORAL at 13:44

## 2022-05-13 RX ADMIN — METHOCARBAMOL 750 MG: 750 TABLET ORAL at 20:58

## 2022-05-13 RX ADMIN — ROSUVASTATIN 40 MG: 20 TABLET, FILM COATED ORAL at 10:03

## 2022-05-13 RX ADMIN — GABAPENTIN 100 MG: 100 CAPSULE ORAL at 20:58

## 2022-05-13 RX ADMIN — ARIPIPRAZOLE 30 MG: 15 TABLET ORAL at 10:02

## 2022-05-13 ASSESSMENT — PAIN SCALES - GENERAL
PAINLEVEL_OUTOF10: 8
PAINLEVEL_OUTOF10: 4
PAINLEVEL_OUTOF10: 7
PAINLEVEL_OUTOF10: 6
PAINLEVEL_OUTOF10: 6
PAINLEVEL_OUTOF10: 8
PAINLEVEL_OUTOF10: 7
PAINLEVEL_OUTOF10: 7
PAINLEVEL_OUTOF10: 8
PAINLEVEL_OUTOF10: 5
PAINLEVEL_OUTOF10: 6
PAINLEVEL_OUTOF10: 4
PAINLEVEL_OUTOF10: 7

## 2022-05-13 ASSESSMENT — PAIN DESCRIPTION - DESCRIPTORS
DESCRIPTORS: DISCOMFORT
DESCRIPTORS: ACHING;DISCOMFORT;SHOOTING
DESCRIPTORS: ACHING;DISCOMFORT;THROBBING
DESCRIPTORS: THROBBING
DESCRIPTORS: ACHING;DISCOMFORT
DESCRIPTORS: DISCOMFORT
DESCRIPTORS: THROBBING
DESCRIPTORS: ACHING;DISCOMFORT;THROBBING
DESCRIPTORS: ACHING;CRUSHING
DESCRIPTORS: THROBBING
DESCRIPTORS: ACHING;DISCOMFORT;THROBBING

## 2022-05-13 ASSESSMENT — PAIN DESCRIPTION - FREQUENCY
FREQUENCY: CONTINUOUS

## 2022-05-13 ASSESSMENT — PAIN DESCRIPTION - ORIENTATION
ORIENTATION: RIGHT

## 2022-05-13 ASSESSMENT — PAIN DESCRIPTION - PAIN TYPE
TYPE: ACUTE PAIN

## 2022-05-13 ASSESSMENT — PAIN - FUNCTIONAL ASSESSMENT
PAIN_FUNCTIONAL_ASSESSMENT: ACTIVITIES ARE NOT PREVENTED
PAIN_FUNCTIONAL_ASSESSMENT: PREVENTS OR INTERFERES SOME ACTIVE ACTIVITIES AND ADLS
PAIN_FUNCTIONAL_ASSESSMENT: PREVENTS OR INTERFERES SOME ACTIVE ACTIVITIES AND ADLS
PAIN_FUNCTIONAL_ASSESSMENT: ACTIVITIES ARE NOT PREVENTED
PAIN_FUNCTIONAL_ASSESSMENT: ACTIVITIES ARE NOT PREVENTED
PAIN_FUNCTIONAL_ASSESSMENT: PREVENTS OR INTERFERES SOME ACTIVE ACTIVITIES AND ADLS
PAIN_FUNCTIONAL_ASSESSMENT: PREVENTS OR INTERFERES SOME ACTIVE ACTIVITIES AND ADLS
PAIN_FUNCTIONAL_ASSESSMENT: PREVENTS OR INTERFERES WITH ALL ACTIVE AND SOME PASSIVE ACTIVITIES
PAIN_FUNCTIONAL_ASSESSMENT: ACTIVITIES ARE NOT PREVENTED

## 2022-05-13 ASSESSMENT — PAIN DESCRIPTION - LOCATION
LOCATION: LEG
LOCATION: GROIN
LOCATION: GROIN
LOCATION: LEG

## 2022-05-13 ASSESSMENT — PAIN DESCRIPTION - ONSET
ONSET: ON-GOING

## 2022-05-13 ASSESSMENT — PAIN DESCRIPTION - DIRECTION
RADIATING_TOWARDS: RIGHT HIP
RADIATING_TOWARDS: RIGHT HIP

## 2022-05-13 NOTE — PROGRESS NOTES
Hospitalist Progress Note      PCP: No primary care provider on file. Date of Admission: 5/12/2022    Chief Complaint: hematoma 2/2 INR      Subjective: Pt was seen at bedside and appears to be doing well, swelling has improved. INR has gone down to 1.4. Will coumadin restart when ok per surgery       Medications:  Reviewed    Infusion Medications    sodium chloride      lactated ringers 75 mL/hr at 05/12/22 1637    sodium chloride       Scheduled Medications    ARIPiprazole  30 mg Oral Daily    metoprolol succinate  25 mg Oral Daily    rosuvastatin  40 mg Oral Daily    sodium chloride flush  10 mL IntraVENous 2 times per day    gabapentin  100 mg Oral TID    methocarbamol  750 mg Oral 4x Daily    aspirin  81 mg Oral Daily     PRN Meds: sodium chloride flush, sodium chloride, promethazine **OR** [DISCONTINUED] ondansetron, polyethylene glycol, acetaminophen **OR** acetaminophen, oxyCODONE **OR** oxyCODONE, morphine, sodium chloride      Intake/Output Summary (Last 24 hours) at 5/13/2022 1346  Last data filed at 5/13/2022 1001  Gross per 24 hour   Intake 960 ml   Output 425 ml   Net 535 ml       Exam:    /87   Pulse 96   Temp 97.9 °F (36.6 °C) (Oral)   Resp 18   Ht 5' 9\" (1.753 m)   Wt 245 lb (111.1 kg)   SpO2 97%   BMI 36.18 kg/m²      Physical examination  General appearance-obese, appears stated age  Respiratory-clear to auscultation bilaterally no added sounds  Cardiovascular-S1-S2 heard no murmurs  Abdomen-soft nontender, nondistended, bowel sounds heard  Extremities-right lower extremity medial aspect shows swelling improved,  nonerythematous nontender. Numbness present at the distal aspect.   Left lower extremity within normal limits  Skin-normal skin color no rashes or lesions noted  Neurologic-no focal deficits , alert and oriented    Labs:   Recent Labs     05/11/22  1435 05/11/22  1435 05/12/22  0521 05/12/22  1340 05/13/22  0636   WBC 10.1  --  9.3  --  7.9   HGB 12.5   < > 11.0* 11.7* 9.6*   HCT 36.9*   < > 33.6* 35.7* 29.6*     --  140  --  116*    < > = values in this interval not displayed. Recent Labs     05/12/22  0521 05/12/22  1340 05/13/22  0636    136 135   K 3.4* 3.4* 3.5   CL 96* 97* 96*   CO2 24 23 26   BUN 18 20 26*   CREATININE 2.4* 2.1* 2.0*   CALCIUM 7.5* 7.7* 7.7*     Recent Labs     05/11/22  1435 05/12/22  0521 05/13/22  0636   * 74* 48*   ALT 64* 52* 37   BILITOT 1.7* 1.3* 1.0   ALKPHOS 214* 189* 161*     Recent Labs     05/11/22  1435 05/12/22  0521 05/13/22  0636   INR >10.0* 6.8* 1.4     No results for input(s): CKTOTAL, TROPONINI in the last 72 hours. Assessment/Plan:    Active Hospital Problems    Diagnosis Date Noted    Retroperitoneal hematoma [K66.1] 05/12/2022     Priority: Medium     -Retroperitoneal hemorrhage  -Supratherapeutic INR  -Elevated troponin  -Hypokalemia  -Hypomagnesemia  -FRANTZ over CKD stage III  -Chronically anticoagulated on warfarin  -Hypertension  -Hyperlipidemia        PLAN:  -Consult general surgery-no active surgical intervention at this time, agree with IV vitamin K, ordered Sparkle Arnie as well  -Monitor INR levels-improved to 1.4, will continue to monitor. Restart when able per surgery. -Monitor hemoglobin levels-currently at 9.6  -Monitor serum electrolytes replete as needed  -Telemetry  -Repeat troponin trending and elevated- consulted cardiology- no acute interventions  -Lactated Ringer's at 75 mL/hr  -Continue home medications  - cr function improved now to 2.0      DVT Prophylaxis: held  Diet: ADULT DIET;  Regular; Low Fat/Low Chol/High Fiber/RIVERA  Code Status: Full Code    Dispo - awaiting clinical improvement    Mina Villaseñor MD

## 2022-05-13 NOTE — CARE COORDINATION
Social Work Discharge Planning:  SW met with patient for initial assessment. Patient lives with 3 roommates in a 2 story home with 7 steps and rails to gain access. Patient independent prior to admission with no DME. Patient has no [facility]x with [facility] or [facility]. Patient PCP is Dr. Rosalind Vance and pharmacy is [facility]. Patient anticipate no needs. Patient friend will transport home at discharge. SW will continue to follow and assist with transition of care.    Electronically signed by ANJEL Reeves on 5/13/2022 at 7:46 PM

## 2022-05-13 NOTE — PROGRESS NOTES
General Surgery Progress Note:    CC: leg pain     S: ok pain stable no nv or cp,       Objective:  @/87   Pulse 96   Temp 97.9 °F (36.6 °C) (Oral)   Resp 18   Ht 5' 9\" (1.753 m)   Wt 245 lb (111.1 kg)   SpO2 97%   BMI 36.18 kg/m² @    Physical -     Gen: NAD  Resp: Breathing Comfortably, no resp distress  CV: Reg Rate mild tachy,   Abd: obese soft non tender   EXT nvi mild moderate L hip pain with flexion     Assessment/Plan: RP hematoma,     Serial HH, transfuse as indicated, INR 1.4,         Candie Reynoso MD FACS   See d/c summary     10:14 AM

## 2022-05-14 ENCOUNTER — APPOINTMENT (OUTPATIENT)
Dept: ULTRASOUND IMAGING | Age: 57
DRG: 253 | End: 2022-05-14
Payer: COMMERCIAL

## 2022-05-14 LAB
ALBUMIN SERPL-MCNC: 3.1 G/DL (ref 3.5–5.2)
ALP BLD-CCNC: 152 U/L (ref 40–129)
ALT SERPL-CCNC: 31 U/L (ref 0–40)
ANION GAP SERPL CALCULATED.3IONS-SCNC: 14 MMOL/L (ref 7–16)
AST SERPL-CCNC: 39 U/L (ref 0–39)
BASOPHILS ABSOLUTE: 0.06 E9/L (ref 0–0.2)
BASOPHILS RELATIVE PERCENT: 0.8 % (ref 0–2)
BILIRUB SERPL-MCNC: 0.8 MG/DL (ref 0–1.2)
BUN BLDV-MCNC: 26 MG/DL (ref 6–20)
CALCIUM SERPL-MCNC: 8.1 MG/DL (ref 8.6–10.2)
CHLORIDE BLD-SCNC: 100 MMOL/L (ref 98–107)
CO2: 22 MMOL/L (ref 22–29)
CREAT SERPL-MCNC: 1.7 MG/DL (ref 0.7–1.2)
EKG ATRIAL RATE: 102 BPM
EKG P AXIS: 9 DEGREES
EKG P-R INTERVAL: 176 MS
EKG Q-T INTERVAL: 402 MS
EKG QRS DURATION: 150 MS
EKG QTC CALCULATION (BAZETT): 523 MS
EKG R AXIS: -60 DEGREES
EKG T AXIS: 35 DEGREES
EKG VENTRICULAR RATE: 102 BPM
EOSINOPHILS ABSOLUTE: 0.48 E9/L (ref 0.05–0.5)
EOSINOPHILS RELATIVE PERCENT: 6.8 % (ref 0–6)
GFR AFRICAN AMERICAN: 51
GFR NON-AFRICAN AMERICAN: 42 ML/MIN/1.73
GLUCOSE BLD-MCNC: 95 MG/DL (ref 74–99)
HCT VFR BLD CALC: 32.8 % (ref 37–54)
HEMOGLOBIN: 10.5 G/DL (ref 12.5–16.5)
IMMATURE GRANULOCYTES #: 0.04 E9/L
IMMATURE GRANULOCYTES %: 0.6 % (ref 0–5)
INR BLD: 1.3
LYMPHOCYTES ABSOLUTE: 1.17 E9/L (ref 1.5–4)
LYMPHOCYTES RELATIVE PERCENT: 16.5 % (ref 20–42)
MCH RBC QN AUTO: 32.9 PG (ref 26–35)
MCHC RBC AUTO-ENTMCNC: 32 % (ref 32–34.5)
MCV RBC AUTO: 102.8 FL (ref 80–99.9)
MONOCYTES ABSOLUTE: 0.72 E9/L (ref 0.1–0.95)
MONOCYTES RELATIVE PERCENT: 10.2 % (ref 2–12)
NEUTROPHILS ABSOLUTE: 4.6 E9/L (ref 1.8–7.3)
NEUTROPHILS RELATIVE PERCENT: 65.1 % (ref 43–80)
PDW BLD-RTO: 16.6 FL (ref 11.5–15)
PLATELET # BLD: 136 E9/L (ref 130–450)
PMV BLD AUTO: 11.4 FL (ref 7–12)
POTASSIUM REFLEX MAGNESIUM: 3.6 MMOL/L (ref 3.5–5)
PROTHROMBIN TIME: 14.6 SEC (ref 9.3–12.4)
RBC # BLD: 3.19 E12/L (ref 3.8–5.8)
SODIUM BLD-SCNC: 136 MMOL/L (ref 132–146)
TOTAL PROTEIN: 6.1 G/DL (ref 6.4–8.3)
WBC # BLD: 7.1 E9/L (ref 4.5–11.5)

## 2022-05-14 PROCEDURE — 93971 EXTREMITY STUDY: CPT

## 2022-05-14 PROCEDURE — 6360000002 HC RX W HCPCS: Performed by: FAMILY MEDICINE

## 2022-05-14 PROCEDURE — 99233 SBSQ HOSP IP/OBS HIGH 50: CPT | Performed by: SURGERY

## 2022-05-14 PROCEDURE — 85025 COMPLETE CBC W/AUTO DIFF WBC: CPT

## 2022-05-14 PROCEDURE — 80053 COMPREHEN METABOLIC PANEL: CPT

## 2022-05-14 PROCEDURE — 93971 EXTREMITY STUDY: CPT | Performed by: RADIOLOGY

## 2022-05-14 PROCEDURE — 2140000000 HC CCU INTERMEDIATE R&B

## 2022-05-14 PROCEDURE — 6370000000 HC RX 637 (ALT 250 FOR IP): Performed by: NURSE PRACTITIONER

## 2022-05-14 PROCEDURE — 6370000000 HC RX 637 (ALT 250 FOR IP): Performed by: STUDENT IN AN ORGANIZED HEALTH CARE EDUCATION/TRAINING PROGRAM

## 2022-05-14 PROCEDURE — 2580000003 HC RX 258: Performed by: FAMILY MEDICINE

## 2022-05-14 PROCEDURE — 85610 PROTHROMBIN TIME: CPT

## 2022-05-14 PROCEDURE — 36415 COLL VENOUS BLD VENIPUNCTURE: CPT

## 2022-05-14 PROCEDURE — 6370000000 HC RX 637 (ALT 250 FOR IP): Performed by: FAMILY MEDICINE

## 2022-05-14 PROCEDURE — 93010 ELECTROCARDIOGRAM REPORT: CPT | Performed by: INTERNAL MEDICINE

## 2022-05-14 PROCEDURE — 6370000000 HC RX 637 (ALT 250 FOR IP): Performed by: SURGERY

## 2022-05-14 RX ORDER — HYDROCODONE BITARTRATE AND ACETAMINOPHEN 5; 325 MG/1; MG/1
2 TABLET ORAL EVERY 4 HOURS PRN
Status: DISCONTINUED | OUTPATIENT
Start: 2022-05-14 | End: 2022-05-16

## 2022-05-14 RX ORDER — WARFARIN SODIUM 5 MG/1
5 TABLET ORAL
Status: COMPLETED | OUTPATIENT
Start: 2022-05-14 | End: 2022-05-14

## 2022-05-14 RX ORDER — HYDROCODONE BITARTRATE AND ACETAMINOPHEN 5; 325 MG/1; MG/1
1 TABLET ORAL EVERY 4 HOURS PRN
Status: DISCONTINUED | OUTPATIENT
Start: 2022-05-14 | End: 2022-05-16

## 2022-05-14 RX ORDER — METHOCARBAMOL 750 MG/1
1500 TABLET, FILM COATED ORAL 4 TIMES DAILY
Status: DISCONTINUED | OUTPATIENT
Start: 2022-05-14 | End: 2022-05-20 | Stop reason: HOSPADM

## 2022-05-14 RX ORDER — GABAPENTIN 300 MG/1
300 CAPSULE ORAL 3 TIMES DAILY
Status: DISCONTINUED | OUTPATIENT
Start: 2022-05-14 | End: 2022-05-20 | Stop reason: HOSPADM

## 2022-05-14 RX ADMIN — METHOCARBAMOL 1500 MG: 750 TABLET ORAL at 17:54

## 2022-05-14 RX ADMIN — HYDROCODONE BITARTRATE AND ACETAMINOPHEN 2 TABLET: 5; 325 TABLET ORAL at 18:46

## 2022-05-14 RX ADMIN — ARIPIPRAZOLE 30 MG: 15 TABLET ORAL at 09:24

## 2022-05-14 RX ADMIN — ROSUVASTATIN 40 MG: 20 TABLET, FILM COATED ORAL at 09:23

## 2022-05-14 RX ADMIN — SODIUM CHLORIDE, PRESERVATIVE FREE 10 ML: 5 INJECTION INTRAVENOUS at 09:27

## 2022-05-14 RX ADMIN — METHOCARBAMOL 1500 MG: 750 TABLET ORAL at 09:23

## 2022-05-14 RX ADMIN — ASPIRIN 81 MG: 81 TABLET, COATED ORAL at 09:23

## 2022-05-14 RX ADMIN — GABAPENTIN 300 MG: 300 CAPSULE ORAL at 09:23

## 2022-05-14 RX ADMIN — METHOCARBAMOL 1500 MG: 750 TABLET ORAL at 20:39

## 2022-05-14 RX ADMIN — WARFARIN SODIUM 5 MG: 5 TABLET ORAL at 17:54

## 2022-05-14 RX ADMIN — SODIUM CHLORIDE, POTASSIUM CHLORIDE, SODIUM LACTATE AND CALCIUM CHLORIDE: 600; 310; 30; 20 INJECTION, SOLUTION INTRAVENOUS at 20:37

## 2022-05-14 RX ADMIN — GABAPENTIN 300 MG: 300 CAPSULE ORAL at 20:39

## 2022-05-14 RX ADMIN — HYDROCODONE BITARTRATE AND ACETAMINOPHEN 2 TABLET: 5; 325 TABLET ORAL at 22:43

## 2022-05-14 RX ADMIN — MORPHINE SULFATE 4 MG: 2 INJECTION, SOLUTION INTRAMUSCULAR; INTRAVENOUS at 11:19

## 2022-05-14 RX ADMIN — HYDROCODONE BITARTRATE AND ACETAMINOPHEN 2 TABLET: 5; 325 TABLET ORAL at 14:39

## 2022-05-14 RX ADMIN — MORPHINE SULFATE 4 MG: 2 INJECTION, SOLUTION INTRAMUSCULAR; INTRAVENOUS at 06:57

## 2022-05-14 RX ADMIN — METOPROLOL SUCCINATE 25 MG: 25 TABLET, EXTENDED RELEASE ORAL at 09:22

## 2022-05-14 RX ADMIN — GABAPENTIN 300 MG: 300 CAPSULE ORAL at 14:34

## 2022-05-14 RX ADMIN — SODIUM CHLORIDE, POTASSIUM CHLORIDE, SODIUM LACTATE AND CALCIUM CHLORIDE: 600; 310; 30; 20 INJECTION, SOLUTION INTRAVENOUS at 06:53

## 2022-05-14 RX ADMIN — MORPHINE SULFATE 4 MG: 2 INJECTION, SOLUTION INTRAMUSCULAR; INTRAVENOUS at 03:00

## 2022-05-14 ASSESSMENT — PAIN DESCRIPTION - DIRECTION
RADIATING_TOWARDS: RIGHT HIP
RADIATING_TOWARDS: RIGHT HIP

## 2022-05-14 ASSESSMENT — PAIN DESCRIPTION - ORIENTATION
ORIENTATION: RIGHT

## 2022-05-14 ASSESSMENT — PAIN DESCRIPTION - LOCATION
LOCATION: GROIN

## 2022-05-14 ASSESSMENT — PAIN DESCRIPTION - DESCRIPTORS
DESCRIPTORS: THROBBING
DESCRIPTORS: ACHING;DISCOMFORT;SORE
DESCRIPTORS: THROBBING
DESCRIPTORS: ACHING;DISCOMFORT;SORE

## 2022-05-14 ASSESSMENT — PAIN SCALES - GENERAL
PAINLEVEL_OUTOF10: 7
PAINLEVEL_OUTOF10: 7
PAINLEVEL_OUTOF10: 0
PAINLEVEL_OUTOF10: 4
PAINLEVEL_OUTOF10: 7
PAINLEVEL_OUTOF10: 0
PAINLEVEL_OUTOF10: 8
PAINLEVEL_OUTOF10: 7

## 2022-05-14 ASSESSMENT — PAIN DESCRIPTION - PAIN TYPE
TYPE: ACUTE PAIN
TYPE: ACUTE PAIN

## 2022-05-14 ASSESSMENT — PAIN DESCRIPTION - ONSET
ONSET: ON-GOING
ONSET: ON-GOING

## 2022-05-14 ASSESSMENT — PAIN DESCRIPTION - FREQUENCY
FREQUENCY: CONTINUOUS
FREQUENCY: CONTINUOUS

## 2022-05-14 NOTE — PROGRESS NOTES
Pharmacy Consultation Note  (Warfarin Dosing and Monitoring)    Initial consult date: 5/14/2022  Consulting Provider: Elizabeth Khanna MD    Micheal Nixon is a 55 y.o. male for whom pharmacy has been asked to manage warfarin therapy. Weight:   Wt Readings from Last 1 Encounters:   05/11/22 245 lb (111.1 kg)       TSH:    Lab Results   Component Value Date    TSH 1.990 05/12/2022       Hepatic Function Panel:                            Lab Results   Component Value Date    ALKPHOS 152 05/14/2022    ALT 31 05/14/2022    AST 39 05/14/2022    PROT 6.1 05/14/2022    BILITOT 0.8 05/14/2022    BILIDIR <0.2 04/05/2022    IBILI see below 04/05/2022    LABALBU 3.1 05/14/2022       Current warfarin drug-drug interactions include: vitamin k administration/TPN/tube feeds (decr INR)     Vitamin K 5 mg IV x 1 given 5/12  Kcentra 5000 units IV x1 given 5/12    Recent Labs     05/12/22  0521 05/12/22  0521 05/12/22  1340 05/13/22  0636 05/14/22  0605   HGB 11.0*   < > 11.7* 9.6* 10.5*     --   --  116* 136    < > = values in this interval not displayed. Date Warfarin Dose INR Heparin or LMWH Comment   5/14 5 mg 1.3 None                                  Assessment:  · Patient is a 55 y.o. male on warfarin for mechanical valves x2: AVR (#23 [employer] bileaflet mechanical) and MVR (#29 [employer] bileaflet mechanical valve)  · Patient's home warfarin dosing regimen is 5 mg on Sat/Sun, and 2.5 mg all other days - decreased during admission in March 2022. · Goal INR 2.5 - 3.5   · Patient's INR > 10 on admission, admitted for retroperitoneal bleed, warfarin reversed w/ kcentra and vitamin K in the ED. · INR 1.3  Today, Hgb 10.5/ Hct 32.8, anticipate transient warfarin resistance following vitamin K administration, patient will likely require a reduction in home regimen due to supratherapeutic INR on admission. Pending US of RLE to rule out DVT.      Plan:  · Warfarin 5 mg x 1  · If US of RLE pending, if DVT present recommend bridging warfarin with parenteral anticoagulation until INR stable in therapeutic range.    · Daily PT/INR until the INR is stable within the therapeutic range  · Pharmacist will follow and monitor/adjust dosing as necessary    Thank you for this consult,    Caity Clemente, PharmD   Pharmacy Resident   Phone: [phone]  5/14/2022 1:04 PM

## 2022-05-14 NOTE — PROGRESS NOTES
Hospitalist Progress Note      PCP: No primary care provider on file. Date of Admission: 5/12/2022    Chief Complaint: hematoma 2/2 INR      Subjective: Pt was seen at bedside and appears to be doing well, swelling has improved. INR has gone down to 1.4. Will coumadin restart when ok per surgery       Medications:  Reviewed    Infusion Medications    sodium chloride      lactated ringers 75 mL/hr at 05/14/22 4326     Scheduled Medications    gabapentin  300 mg Oral TID    methocarbamol  1,500 mg Oral 4x Daily    ARIPiprazole  30 mg Oral Daily    metoprolol succinate  25 mg Oral Daily    rosuvastatin  40 mg Oral Daily    sodium chloride flush  10 mL IntraVENous 2 times per day    aspirin  81 mg Oral Daily     PRN Meds: HYDROcodone 5 mg - acetaminophen **OR** HYDROcodone 5 mg - acetaminophen, sodium chloride flush, sodium chloride, promethazine **OR** [DISCONTINUED] ondansetron, polyethylene glycol, morphine      Intake/Output Summary (Last 24 hours) at 5/14/2022 1237  Last data filed at 5/14/2022 1118  Gross per 24 hour   Intake 4238.75 ml   Output 1650 ml   Net 2588.75 ml       Exam:    /82   Pulse 79   Temp 97.6 °F (36.4 °C)   Resp 18   Ht 5' 9\" (1.753 m)   Wt 245 lb (111.1 kg)   SpO2 97%   BMI 36.18 kg/m²      Physical examination  General appearance-obese, appears stated age  Respiratory-clear to auscultation bilaterally no added sounds  Cardiovascular-S1-S2 heard no murmurs  Abdomen-soft nontender, nondistended, bowel sounds heard  Extremities-right lower extremity medial aspect shows swelling improved,  nonerythematous nontender. Numbness present at the distal aspect.   Left lower extremity within normal limits  Skin-normal skin color no rashes or lesions noted  Neurologic-no focal deficits , alert and oriented    Labs:   Recent Labs     05/12/22  0521 05/12/22  0521 05/12/22  1340 05/13/22  0636 05/14/22  0605   WBC 9.3  --   --  7.9 7.1   HGB 11.0*   < > 11.7* 9.6* 10.5*   HCT 33.6*   < > 35.7* 29.6* 32.8*     --   --  116* 136    < > = values in this interval not displayed. Recent Labs     05/12/22  1340 05/13/22  0636 05/14/22  0605    135 136   K 3.4* 3.5 3.6   CL 97* 96* 100   CO2 23 26 22   BUN 20 26* 26*   CREATININE 2.1* 2.0* 1.7*   CALCIUM 7.7* 7.7* 8.1*     Recent Labs     05/12/22  0521 05/13/22  0636 05/14/22  0605   AST 74* 48* 39   ALT 52* 37 31   BILITOT 1.3* 1.0 0.8   ALKPHOS 189* 161* 152*     Recent Labs     05/12/22  0521 05/13/22  0636 05/14/22  0605   INR 6.8* 1.4 1.3     No results for input(s): CKTOTAL, TROPONINI in the last 72 hours. Assessment/Plan:    Active Hospital Problems    Diagnosis Date Noted    Retroperitoneal hematoma [K66.1] 05/12/2022     Priority: Medium     -Retroperitoneal hemorrhage  -Supratherapeutic INR  -Elevated troponin  -Hypokalemia  -Hypomagnesemia  -FRANTZ over CKD stage III  -Chronically anticoagulated on warfarin  -Hypertension  -Hyperlipidemia        PLAN:  -Consult general surgery-no active surgical intervention at this time, ok to restart back coumadin. US ordered for ruling out DVT  -Monitor INR levels-improved to 1.4, pharmacy consulted for warfarin   -Monitor hemoglobin levels-currently at 10.5  -Monitor serum electrolytes replete as needed  -Telemetry  -Repeat troponin trending and elevated- consulted cardiology- no acute interventions  -Lactated Ringer's at 75 mL/hr  -Continue home medications  - cr function improved now to 1.7      DVT Prophylaxis: held  Diet: ADULT DIET;  Regular; Low Fat/Low Chol/High Fiber/RIVERA  Code Status: Full Code    Dispo - awaiting clinical improvement    Leann Shafer MD

## 2022-05-14 NOTE — PROGRESS NOTES
CC: Spontaneous retroperitoneal hematoma    Assessment:    Spontaneous retroperitoneal hematoma  Right leg swelling-rule out DVT    Plan:  Increase gabapentin  Okay to resume anticoagulation  PT OT eval's  Check ultrasound right lower extremity to rule out DVT    Subjective:  Complaining of insomnia due to severe right thigh pain  No status right foot and ankle are starting to swell    Objective:  General -obese white man. Appears moderately uncomfortable  Neuro -awake alert attentive  Abdomen -nontender nondistended  Ext -no ecchymosis along right groin or thigh.   Swelling to right calf and foot without tenderness

## 2022-05-15 LAB
ALBUMIN SERPL-MCNC: 3.4 G/DL (ref 3.5–5.2)
ALP BLD-CCNC: 161 U/L (ref 40–129)
ALT SERPL-CCNC: 32 U/L (ref 0–40)
ANION GAP SERPL CALCULATED.3IONS-SCNC: 9 MMOL/L (ref 7–16)
AST SERPL-CCNC: 43 U/L (ref 0–39)
BASOPHILS ABSOLUTE: 0.04 E9/L (ref 0–0.2)
BASOPHILS RELATIVE PERCENT: 0.7 % (ref 0–2)
BILIRUB SERPL-MCNC: 1.2 MG/DL (ref 0–1.2)
BUN BLDV-MCNC: 26 MG/DL (ref 6–20)
CALCIUM SERPL-MCNC: 8.3 MG/DL (ref 8.6–10.2)
CHLORIDE BLD-SCNC: 105 MMOL/L (ref 98–107)
CO2: 24 MMOL/L (ref 22–29)
CREAT SERPL-MCNC: 1.8 MG/DL (ref 0.7–1.2)
EOSINOPHILS ABSOLUTE: 0.33 E9/L (ref 0.05–0.5)
EOSINOPHILS RELATIVE PERCENT: 5.5 % (ref 0–6)
GFR AFRICAN AMERICAN: 47
GFR NON-AFRICAN AMERICAN: 39 ML/MIN/1.73
GLUCOSE BLD-MCNC: 93 MG/DL (ref 74–99)
HCT VFR BLD CALC: 31.7 % (ref 37–54)
HEMOGLOBIN: 9.9 G/DL (ref 12.5–16.5)
IMMATURE GRANULOCYTES #: 0.05 E9/L
IMMATURE GRANULOCYTES %: 0.8 % (ref 0–5)
INR BLD: 1.3
LYMPHOCYTES ABSOLUTE: 1 E9/L (ref 1.5–4)
LYMPHOCYTES RELATIVE PERCENT: 16.8 % (ref 20–42)
MCH RBC QN AUTO: 32.6 PG (ref 26–35)
MCHC RBC AUTO-ENTMCNC: 31.2 % (ref 32–34.5)
MCV RBC AUTO: 104.3 FL (ref 80–99.9)
MONOCYTES ABSOLUTE: 0.63 E9/L (ref 0.1–0.95)
MONOCYTES RELATIVE PERCENT: 10.6 % (ref 2–12)
NEUTROPHILS ABSOLUTE: 3.92 E9/L (ref 1.8–7.3)
NEUTROPHILS RELATIVE PERCENT: 65.6 % (ref 43–80)
PDW BLD-RTO: 16.9 FL (ref 11.5–15)
PLATELET # BLD: 120 E9/L (ref 130–450)
PMV BLD AUTO: 10.8 FL (ref 7–12)
POTASSIUM REFLEX MAGNESIUM: 3.8 MMOL/L (ref 3.5–5)
PROTHROMBIN TIME: 14.4 SEC (ref 9.3–12.4)
RBC # BLD: 3.04 E12/L (ref 3.8–5.8)
SODIUM BLD-SCNC: 138 MMOL/L (ref 132–146)
TOTAL PROTEIN: 6.4 G/DL (ref 6.4–8.3)
WBC # BLD: 6 E9/L (ref 4.5–11.5)

## 2022-05-15 PROCEDURE — 2580000003 HC RX 258: Performed by: FAMILY MEDICINE

## 2022-05-15 PROCEDURE — 2140000000 HC CCU INTERMEDIATE R&B

## 2022-05-15 PROCEDURE — 6370000000 HC RX 637 (ALT 250 FOR IP): Performed by: STUDENT IN AN ORGANIZED HEALTH CARE EDUCATION/TRAINING PROGRAM

## 2022-05-15 PROCEDURE — 2580000003 HC RX 258: Performed by: STUDENT IN AN ORGANIZED HEALTH CARE EDUCATION/TRAINING PROGRAM

## 2022-05-15 PROCEDURE — 80053 COMPREHEN METABOLIC PANEL: CPT

## 2022-05-15 PROCEDURE — 6370000000 HC RX 637 (ALT 250 FOR IP): Performed by: SURGERY

## 2022-05-15 PROCEDURE — 85610 PROTHROMBIN TIME: CPT

## 2022-05-15 PROCEDURE — 36415 COLL VENOUS BLD VENIPUNCTURE: CPT

## 2022-05-15 PROCEDURE — 99232 SBSQ HOSP IP/OBS MODERATE 35: CPT | Performed by: SURGERY

## 2022-05-15 PROCEDURE — 6370000000 HC RX 637 (ALT 250 FOR IP): Performed by: FAMILY MEDICINE

## 2022-05-15 PROCEDURE — 6370000000 HC RX 637 (ALT 250 FOR IP)

## 2022-05-15 PROCEDURE — 85025 COMPLETE CBC W/AUTO DIFF WBC: CPT

## 2022-05-15 PROCEDURE — 6370000000 HC RX 637 (ALT 250 FOR IP): Performed by: NURSE PRACTITIONER

## 2022-05-15 RX ORDER — WARFARIN SODIUM 5 MG/1
5 TABLET ORAL
Status: COMPLETED | OUTPATIENT
Start: 2022-05-15 | End: 2022-05-15

## 2022-05-15 RX ADMIN — METHOCARBAMOL 1500 MG: 750 TABLET ORAL at 09:45

## 2022-05-15 RX ADMIN — HYDROCODONE BITARTRATE AND ACETAMINOPHEN 2 TABLET: 5; 325 TABLET ORAL at 19:38

## 2022-05-15 RX ADMIN — WARFARIN SODIUM 5 MG: 5 TABLET ORAL at 17:40

## 2022-05-15 RX ADMIN — METHOCARBAMOL 1500 MG: 750 TABLET ORAL at 13:27

## 2022-05-15 RX ADMIN — HYDROCODONE BITARTRATE AND ACETAMINOPHEN 2 TABLET: 5; 325 TABLET ORAL at 09:48

## 2022-05-15 RX ADMIN — SODIUM CHLORIDE, PRESERVATIVE FREE 10 ML: 5 INJECTION INTRAVENOUS at 09:46

## 2022-05-15 RX ADMIN — METHOCARBAMOL 1500 MG: 750 TABLET ORAL at 17:40

## 2022-05-15 RX ADMIN — GABAPENTIN 300 MG: 300 CAPSULE ORAL at 21:42

## 2022-05-15 RX ADMIN — GABAPENTIN 300 MG: 300 CAPSULE ORAL at 13:27

## 2022-05-15 RX ADMIN — SODIUM CHLORIDE, POTASSIUM CHLORIDE, SODIUM LACTATE AND CALCIUM CHLORIDE: 600; 310; 30; 20 INJECTION, SOLUTION INTRAVENOUS at 10:01

## 2022-05-15 RX ADMIN — METOPROLOL SUCCINATE 25 MG: 25 TABLET, EXTENDED RELEASE ORAL at 09:49

## 2022-05-15 RX ADMIN — ASPIRIN 81 MG: 81 TABLET, COATED ORAL at 09:45

## 2022-05-15 RX ADMIN — GABAPENTIN 300 MG: 300 CAPSULE ORAL at 09:44

## 2022-05-15 RX ADMIN — HYDROCODONE BITARTRATE AND ACETAMINOPHEN 2 TABLET: 5; 325 TABLET ORAL at 14:36

## 2022-05-15 RX ADMIN — HYDROCODONE BITARTRATE AND ACETAMINOPHEN 2 TABLET: 5; 325 TABLET ORAL at 23:45

## 2022-05-15 RX ADMIN — HYDROCODONE BITARTRATE AND ACETAMINOPHEN 2 TABLET: 5; 325 TABLET ORAL at 05:47

## 2022-05-15 RX ADMIN — ARIPIPRAZOLE 30 MG: 15 TABLET ORAL at 09:49

## 2022-05-15 RX ADMIN — METHOCARBAMOL 1500 MG: 750 TABLET ORAL at 21:42

## 2022-05-15 RX ADMIN — ROSUVASTATIN 40 MG: 20 TABLET, FILM COATED ORAL at 09:44

## 2022-05-15 ASSESSMENT — PAIN DESCRIPTION - PAIN TYPE
TYPE: ACUTE PAIN

## 2022-05-15 ASSESSMENT — PAIN SCALES - GENERAL
PAINLEVEL_OUTOF10: 7
PAINLEVEL_OUTOF10: 8
PAINLEVEL_OUTOF10: 6
PAINLEVEL_OUTOF10: 8
PAINLEVEL_OUTOF10: 7

## 2022-05-15 ASSESSMENT — PAIN DESCRIPTION - LOCATION
LOCATION: LEG
LOCATION: BACK;LEG
LOCATION: LEG

## 2022-05-15 ASSESSMENT — PAIN DESCRIPTION - ORIENTATION
ORIENTATION: RIGHT
ORIENTATION: RIGHT
ORIENTATION: RIGHT;LOWER
ORIENTATION: RIGHT;UPPER
ORIENTATION: RIGHT
ORIENTATION: RIGHT

## 2022-05-15 ASSESSMENT — PAIN DESCRIPTION - DESCRIPTORS
DESCRIPTORS: ACHING;DISCOMFORT;THROBBING
DESCRIPTORS: ACHING;PRESSURE;SORE
DESCRIPTORS: ACHING;THROBBING;DISCOMFORT
DESCRIPTORS: ACHING;SORE
DESCRIPTORS: THROBBING;SORE;DISCOMFORT
DESCRIPTORS: ACHING;DISCOMFORT

## 2022-05-15 ASSESSMENT — PAIN DESCRIPTION - FREQUENCY
FREQUENCY: CONTINUOUS
FREQUENCY: CONTINUOUS
FREQUENCY: INTERMITTENT

## 2022-05-15 ASSESSMENT — PAIN DESCRIPTION - ONSET: ONSET: ON-GOING

## 2022-05-15 ASSESSMENT — PAIN - FUNCTIONAL ASSESSMENT: PAIN_FUNCTIONAL_ASSESSMENT: ACTIVITIES ARE NOT PREVENTED

## 2022-05-15 NOTE — PROGRESS NOTES
Pharmacy Consultation Note  (Warfarin Dosing and Monitoring)    Initial consult date: 5/14/2022  Consulting Provider: Manoj Barth MD    Christopher Saunders is a 55 y.o. male for whom pharmacy has been asked to manage warfarin therapy. Weight:   Wt Readings from Last 1 Encounters:   05/11/22 245 lb (111.1 kg)       TSH:    Lab Results   Component Value Date    TSH 1.990 05/12/2022       Hepatic Function Panel:                            Lab Results   Component Value Date    ALKPHOS 161 05/15/2022    ALT 32 05/15/2022    AST 43 05/15/2022    PROT 6.4 05/15/2022    BILITOT 1.2 05/15/2022    BILIDIR <0.2 04/05/2022    IBILI see below 04/05/2022    LABALBU 3.4 05/15/2022       Current warfarin drug-drug interactions include: vitamin k administration/TPN/tube feeds (decr INR)     Vitamin K 5 mg IV x 1 given 5/12  Kcentra 5000 units IV x1 given 5/12    Recent Labs     05/13/22  0636 05/14/22  0605 05/15/22  0559   HGB 9.6* 10.5* 9.9*   * 136 120*     Date Warfarin Dose INR Heparin or LMWH Comment   5/14 5 mg 1.3 None    5/15 < 5 mg > 1.3 None                           Assessment:  · Patient is a 55 y.o. male on warfarin for mechanical valves x2: AVR (#23 [employer] bileaflet mechanical) and MVR (#29 [employer] bileaflet mechanical valve)  · Patient's home warfarin dosing regimen is 5 mg on Sat/Sun, and 2.5 mg all other days - decreased during admission in March 2022. · Goal INR 2.5 - 3.5   · Patient's INR > 10 on admission, admitted for retroperitoneal bleed, warfarin reversed w/ kcentra and vitamin K in the ED. · 5/14: INR 1.3  Today, Hgb 10.5/ Hct 32.8, anticipate transient warfarin resistance following vitamin K administration, patient will likely require a reduction in home regimen due to supratherapeutic INR on admission. Pending US of RLE to rule out DVT.    · 5/15: INR 1.3, no DVT on US    Plan:  · Warfarin 5 mg x 1  · Daily PT/INR until the INR is stable within the therapeutic range  · Pharmacist will follow and monitor/adjust dosing as necessary    Thank you for this consult,    Gina Meza, PharmD   Pharmacy Resident   Phone: [phone]  5/15/2022 12:36 PM

## 2022-05-15 NOTE — PROGRESS NOTES
Hospitalist Progress Note      PCP: No primary care provider on file. Date of Admission: 5/12/2022    Chief Complaint: hematoma 2/2 INR      Subjective: Pt was seen at bedside and appears to be doing well, swelling has improved. INR has gone down to 1.3. RESTARTED warfarin yesterday      Medications:  Reviewed    Infusion Medications    sodium chloride      lactated ringers 100 mL/hr at 05/15/22 1001     Scheduled Medications    gabapentin  300 mg Oral TID    methocarbamol  1,500 mg Oral 4x Daily    warfarin placeholder: dosing by pharmacy   Other RX Placeholder    ARIPiprazole  30 mg Oral Daily    metoprolol succinate  25 mg Oral Daily    rosuvastatin  40 mg Oral Daily    sodium chloride flush  10 mL IntraVENous 2 times per day    aspirin  81 mg Oral Daily     PRN Meds: HYDROcodone 5 mg - acetaminophen **OR** HYDROcodone 5 mg - acetaminophen, sodium chloride flush, sodium chloride, promethazine **OR** [DISCONTINUED] ondansetron, polyethylene glycol      Intake/Output Summary (Last 24 hours) at 5/15/2022 1049  Last data filed at 5/15/2022 7517  Gross per 24 hour   Intake 1890.59 ml   Output 2100 ml   Net -209.41 ml       Exam:    BP (!) 146/86   Pulse 81   Temp 97.7 °F (36.5 °C) (Oral)   Resp 20   Ht 5' 9\" (1.753 m)   Wt 245 lb (111.1 kg)   SpO2 98%   BMI 36.18 kg/m²      Physical examination  General appearance-obese, appears stated age  Respiratory-clear to auscultation bilaterally no added sounds  Cardiovascular-S1-S2 heard no murmurs  Abdomen-soft nontender, nondistended, bowel sounds heard  Extremities-right lower extremity medial aspect shows swelling improved,  nonerythematous nontender. Numbness present at the distal aspect.   Left lower extremity within normal limits  Skin-normal skin color no rashes or lesions noted  Neurologic-no focal deficits , alert and oriented    Labs:   Recent Labs     05/13/22  0636 05/14/22  0605 05/15/22  0559   WBC 7.9 7.1 6.0   HGB 9.6* 10.5* 9.9* HCT 29.6* 32.8* 31.7*   * 136 120*     Recent Labs     05/13/22  0636 05/14/22  0605 05/15/22  0559    136 138   K 3.5 3.6 3.8   CL 96* 100 105   CO2 26 22 24   BUN 26* 26* 26*   CREATININE 2.0* 1.7* 1.8*   CALCIUM 7.7* 8.1* 8.3*     Recent Labs     05/13/22  0636 05/14/22  0605 05/15/22  0559   AST 48* 39 43*   ALT 37 31 32   BILITOT 1.0 0.8 1.2   ALKPHOS 161* 152* 161*     Recent Labs     05/13/22 0636 05/14/22  0605 05/15/22  0559   INR 1.4 1.3 1.3     No results for input(s): Tina Almonte in the last 72 hours. Assessment/Plan:    Active Hospital Problems    Diagnosis Date Noted    Retroperitoneal hematoma [K66.1] 05/12/2022     Priority: Medium     -Retroperitoneal hemorrhage  -Supratherapeutic INR  -Elevated troponin  -Hypokalemia  -Hypomagnesemia  -FRANTZ over CKD stage III  -Chronically anticoagulated on warfarin  -Hypertension  -Hyperlipidemia        PLAN:  -Consult general surgery-no active surgical intervention at this time, ok to restart back coumadin. US ordered for ruling out DVT- No DVT  -Monitor INR levels-improved to 1.4, pharmacy consulted for warfarin   -Monitor hemoglobin levels-currently at 9.9  -Monitor serum electrolytes replete as needed  -Telemetry  -Repeat troponin trending and elevated- consulted cardiology- no acute interventions  -Lactated Ringer's at 75 mL/hr increased to 100cc/hr  -Continue home medications  - cr function at 1.8      DVT Prophylaxis: held  Diet: ADULT DIET;  Regular; Low Fat/Low Chol/High Fiber/RIVERA  Code Status: Full Code    Dispo - awaiting clinical improvement, Malena Demarco MD

## 2022-05-15 NOTE — PROGRESS NOTES
CC: Spontaneous retroperitoneal hematoma    Assessment:    Spontaneous retroperitoneal hematoma    Plan:  Continue gabapentin  Warfarin resumed  PT OT eval's  Surgery will sign off-please call if needed    Subjective:  States the pain in his right thigh and hip are slowly improving  Right lower extremity duplex venous ultrasound done yesterday showed no DVT    Objective:  General -obese white man. Appears nontoxic  Ext -no ecchymosis along right groin or thigh.   Swelling to right calf and foot without tenderness

## 2022-05-16 LAB
ALBUMIN SERPL-MCNC: 3.3 G/DL (ref 3.5–5.2)
ALP BLD-CCNC: 162 U/L (ref 40–129)
ALT SERPL-CCNC: 31 U/L (ref 0–40)
ANION GAP SERPL CALCULATED.3IONS-SCNC: 11 MMOL/L (ref 7–16)
AST SERPL-CCNC: 44 U/L (ref 0–39)
BASOPHILS ABSOLUTE: 0.05 E9/L (ref 0–0.2)
BASOPHILS RELATIVE PERCENT: 0.9 % (ref 0–2)
BILIRUB SERPL-MCNC: 0.8 MG/DL (ref 0–1.2)
BUN BLDV-MCNC: 23 MG/DL (ref 6–20)
CALCIUM SERPL-MCNC: 8.6 MG/DL (ref 8.6–10.2)
CHLORIDE BLD-SCNC: 104 MMOL/L (ref 98–107)
CO2: 22 MMOL/L (ref 22–29)
CREAT SERPL-MCNC: 1.7 MG/DL (ref 0.7–1.2)
EOSINOPHILS ABSOLUTE: 0.32 E9/L (ref 0.05–0.5)
EOSINOPHILS RELATIVE PERCENT: 5.6 % (ref 0–6)
GFR AFRICAN AMERICAN: 51
GFR NON-AFRICAN AMERICAN: 42 ML/MIN/1.73
GLUCOSE BLD-MCNC: 85 MG/DL (ref 74–99)
HCT VFR BLD CALC: 31.3 % (ref 37–54)
HEMOGLOBIN: 9.6 G/DL (ref 12.5–16.5)
IMMATURE GRANULOCYTES #: 0.05 E9/L
IMMATURE GRANULOCYTES %: 0.9 % (ref 0–5)
INR BLD: 1.6
LYMPHOCYTES ABSOLUTE: 1.19 E9/L (ref 1.5–4)
LYMPHOCYTES RELATIVE PERCENT: 20.8 % (ref 20–42)
MCH RBC QN AUTO: 32.5 PG (ref 26–35)
MCHC RBC AUTO-ENTMCNC: 30.7 % (ref 32–34.5)
MCV RBC AUTO: 106.1 FL (ref 80–99.9)
MONOCYTES ABSOLUTE: 0.73 E9/L (ref 0.1–0.95)
MONOCYTES RELATIVE PERCENT: 12.8 % (ref 2–12)
NEUTROPHILS ABSOLUTE: 3.37 E9/L (ref 1.8–7.3)
NEUTROPHILS RELATIVE PERCENT: 59 % (ref 43–80)
PDW BLD-RTO: 17.2 FL (ref 11.5–15)
PLATELET # BLD: 132 E9/L (ref 130–450)
PMV BLD AUTO: 11.3 FL (ref 7–12)
POTASSIUM REFLEX MAGNESIUM: 4.3 MMOL/L (ref 3.5–5)
PROTHROMBIN TIME: 18.4 SEC (ref 9.3–12.4)
RBC # BLD: 2.95 E12/L (ref 3.8–5.8)
SODIUM BLD-SCNC: 137 MMOL/L (ref 132–146)
TOTAL PROTEIN: 6.4 G/DL (ref 6.4–8.3)
WBC # BLD: 5.7 E9/L (ref 4.5–11.5)

## 2022-05-16 PROCEDURE — 97165 OT EVAL LOW COMPLEX 30 MIN: CPT

## 2022-05-16 PROCEDURE — 85610 PROTHROMBIN TIME: CPT

## 2022-05-16 PROCEDURE — 6370000000 HC RX 637 (ALT 250 FOR IP): Performed by: SURGERY

## 2022-05-16 PROCEDURE — 97530 THERAPEUTIC ACTIVITIES: CPT

## 2022-05-16 PROCEDURE — 6370000000 HC RX 637 (ALT 250 FOR IP): Performed by: NURSE PRACTITIONER

## 2022-05-16 PROCEDURE — 6370000000 HC RX 637 (ALT 250 FOR IP): Performed by: STUDENT IN AN ORGANIZED HEALTH CARE EDUCATION/TRAINING PROGRAM

## 2022-05-16 PROCEDURE — 80053 COMPREHEN METABOLIC PANEL: CPT

## 2022-05-16 PROCEDURE — 6370000000 HC RX 637 (ALT 250 FOR IP): Performed by: FAMILY MEDICINE

## 2022-05-16 PROCEDURE — 2140000000 HC CCU INTERMEDIATE R&B

## 2022-05-16 PROCEDURE — 2580000003 HC RX 258: Performed by: FAMILY MEDICINE

## 2022-05-16 PROCEDURE — 85025 COMPLETE CBC W/AUTO DIFF WBC: CPT

## 2022-05-16 PROCEDURE — 36415 COLL VENOUS BLD VENIPUNCTURE: CPT

## 2022-05-16 RX ORDER — WARFARIN SODIUM 2.5 MG/1
2.5 TABLET ORAL
Status: COMPLETED | OUTPATIENT
Start: 2022-05-16 | End: 2022-05-16

## 2022-05-16 RX ORDER — HYDROCODONE BITARTRATE AND ACETAMINOPHEN 5; 325 MG/1; MG/1
1 TABLET ORAL 2 TIMES DAILY PRN
Status: DISCONTINUED | OUTPATIENT
Start: 2022-05-16 | End: 2022-05-19

## 2022-05-16 RX ADMIN — HYDROCODONE BITARTRATE AND ACETAMINOPHEN 2 TABLET: 5; 325 TABLET ORAL at 06:40

## 2022-05-16 RX ADMIN — METHOCARBAMOL 1500 MG: 750 TABLET ORAL at 09:00

## 2022-05-16 RX ADMIN — GABAPENTIN 300 MG: 300 CAPSULE ORAL at 14:30

## 2022-05-16 RX ADMIN — METHOCARBAMOL 1500 MG: 750 TABLET ORAL at 17:00

## 2022-05-16 RX ADMIN — ARIPIPRAZOLE 30 MG: 15 TABLET ORAL at 10:49

## 2022-05-16 RX ADMIN — ROSUVASTATIN 40 MG: 20 TABLET, FILM COATED ORAL at 09:00

## 2022-05-16 RX ADMIN — GABAPENTIN 300 MG: 300 CAPSULE ORAL at 20:45

## 2022-05-16 RX ADMIN — GABAPENTIN 300 MG: 300 CAPSULE ORAL at 09:00

## 2022-05-16 RX ADMIN — METHOCARBAMOL 1500 MG: 750 TABLET ORAL at 13:00

## 2022-05-16 RX ADMIN — SODIUM CHLORIDE, PRESERVATIVE FREE 10 ML: 5 INJECTION INTRAVENOUS at 20:45

## 2022-05-16 RX ADMIN — METHOCARBAMOL 1500 MG: 750 TABLET ORAL at 20:45

## 2022-05-16 RX ADMIN — HYDROCODONE BITARTRATE AND ACETAMINOPHEN 1 TABLET: 5; 325 TABLET ORAL at 12:30

## 2022-05-16 RX ADMIN — ASPIRIN 81 MG: 81 TABLET, COATED ORAL at 09:00

## 2022-05-16 RX ADMIN — METOPROLOL SUCCINATE 25 MG: 25 TABLET, EXTENDED RELEASE ORAL at 09:00

## 2022-05-16 RX ADMIN — WARFARIN SODIUM 2.5 MG: 2.5 TABLET ORAL at 18:22

## 2022-05-16 ASSESSMENT — PAIN DESCRIPTION - LOCATION
LOCATION: LEG
LOCATION: BACK

## 2022-05-16 ASSESSMENT — PAIN DESCRIPTION - DESCRIPTORS
DESCRIPTORS: ACHING
DESCRIPTORS: ACHING
DESCRIPTORS: ACHING;DISCOMFORT;SORE
DESCRIPTORS: ACHING

## 2022-05-16 ASSESSMENT — PAIN DESCRIPTION - FREQUENCY
FREQUENCY: CONTINUOUS

## 2022-05-16 ASSESSMENT — PAIN SCALES - GENERAL
PAINLEVEL_OUTOF10: 0
PAINLEVEL_OUTOF10: 7

## 2022-05-16 ASSESSMENT — PAIN DESCRIPTION - ORIENTATION
ORIENTATION: LOWER
ORIENTATION: RIGHT

## 2022-05-16 ASSESSMENT — PAIN DESCRIPTION - ONSET
ONSET: ON-GOING
ONSET: ON-GOING

## 2022-05-16 ASSESSMENT — PAIN DESCRIPTION - PAIN TYPE: TYPE: ACUTE PAIN

## 2022-05-16 NOTE — PROGRESS NOTES
Pharmacy Consultation Note  (Warfarin Dosing and Monitoring)    Initial consult date: 5/14/2022  Consulting Provider: Tabatha Jarvis MD    Jolie Michael is a 55 y.o. male for whom pharmacy has been asked to manage warfarin therapy. Weight:   Wt Readings from Last 1 Encounters:   05/11/22 245 lb (111.1 kg)       TSH:    Lab Results   Component Value Date    TSH 1.990 05/12/2022       Hepatic Function Panel:                            Lab Results   Component Value Date    ALKPHOS 162 05/16/2022    ALT 31 05/16/2022    AST 44 05/16/2022    PROT 6.4 05/16/2022    BILITOT 0.8 05/16/2022    BILIDIR <0.2 04/05/2022    IBILI see below 04/05/2022    LABALBU 3.3 05/16/2022       Current warfarin drug-drug interactions include: vitamin k administration/TPN/tube feeds (decr INR)     Vitamin K 5 mg IV x 1 given 5/12  Kcentra 5000 units IV x1 given 5/12    Recent Labs     05/14/22  0605 05/15/22  0559 05/16/22  0438   HGB 10.5* 9.9* 9.6*    120* 132     Date Warfarin Dose INR Heparin or LMWH Comment   5/14 5 mg 1.3 None    5/15 5 mg 1.3 None    5/16 <2.5 mg> 1.6 None                    Assessment:  · Patient is a 55 y.o. male on warfarin for mechanical valves x2: AVR (#23 [employer] bileaflet mechanical) and MVR (#29 [employer] bileaflet mechanical valve)  · Patient's home warfarin dosing regimen is 5 mg on Sat/Sun, and 2.5 mg all other days - decreased during admission in March 2022. · Goal INR 2.5 - 3.5   · Patient's INR > 10 on admission, admitted for retroperitoneal bleed, warfarin reversed w/ kcentra and vitamin K in the ED. · 5/14: INR 1.3  Today, Hgb 10.5/ Hct 32.8, anticipate transient warfarin resistance following vitamin K administration, patient will likely require a reduction in home regimen due to supratherapeutic INR on admission. Pending US of RLE to rule out DVT.    · 5/15: INR 1.3, no DVT on US    Plan:  · Warfarin 2.5 mg x 1  · Daily PT/INR until the INR is stable within the therapeutic range  · Pharmacist will follow and monitor/adjust dosing as necessary    Thank you for this consult,    Елена Alva, PharmD 5/16/2022 10:02 AM

## 2022-05-16 NOTE — PROGRESS NOTES
Occupational Therapy  OCCUPATIONAL THERAPY INITIAL EVALUATION     [facility] [employer] [address] [address] [facility]                                                Patient Name: Bradly Bryan  MRN: [id number]  : 1965  Room: [id number]    Referring Provider: Rose Anthony MD  Specific Provider Orders/Date: OT eval and treat 22    Diagnosis: Retroperitoneal hematoma [K66.1]  Right leg pain [M79.604]  Warfarin-induced coagulopathy ([facility] [address] [Z99.84, H47.596F]  Abdominal pain, unspecified abdominal location [R10.9]  Chest pain, unspecified type [R07.9]   Pt admitted to hospital on 22 for R LE pain, inability to walk    Pertinent Medical History: L tibial plateau fx (4024)  Per ortho note (3/29), pt NWB L LE w/ hinged knee brace  This therapist discussed w/ pt - pt verbalized that stopped wearing knee brace and maintaining NWB L LE. When therapist asked if treating orthopedic physician aware, pt unable to provide direct response. This therapist then perfectserved treating orthopedic physician this date (). Per Italo Mayberry, DO, pt to be strict NWB L LE. RN notified/made aware. Pt then thoroughly educated on weightbear status and benefits. Max cues to maintain during session.       Precautions:  Fall Risk, strict NWB L LE (perfectserved ortho on ), decreased insight to deficits    Assessment of current deficits    [x] Functional mobility  [x]ADLs  [x] Strength               []Cognition    [x] Functional transfers   [x] IADLs         [x] Safety Awareness   [x]Endurance    [] Fine Coordination              [x] Balance      [] Vision/perception   []Sensation     []Gross Motor Coordination  [] ROM  [] Delirium                   [] Motor Control     OT PLAN OF CARE   OT POC based on physician orders, patient diagnosis and results of clinical assessment    Frequency/Duration 1-3 days/wk LTGs  Time frame: 10-14 days   Feeding Independent   -   Grooming Stand by Assist   Seated EOB  Modified [place]    UB Dressing Stand by Assist   Modified [place]    LB Dressing Maximal Assist   Stand by Assist    Bathing Moderate Assist  Stand by Assist    Toileting Moderate Assist   Stand by Assist    Bed Mobility  Supine to sit: Supervision   Sit to supine: Supervision   Supine to sit: Modified [place]   Sit to supine: Modified [place]    Functional Transfers Minimal Assist   Modified [place]    Functional Mobility Moderate Assist w/ w/w  Steps for/backward  Increased assist d/t NWB L LE  Supervision    Balance Sitting:     Static:  Independent    Dynamic:SBA  Standing: Mod A w/ w/w     Activity Tolerance Fair-  Limited by difficulty maintaining weightbear status and fatigue  Fair+    Visual/  Perceptual Glasses: yes                  Hand Dominance R   AROM (PROM) Strength Additional Info:    RUE  WFL 4/5 good  and wfl FMC/dexterity noted during ADL tasks       LUE WFL 4/5 good  and wfl FMC/dexterity noted during ADL tasks       Hearing: WFL  Sensation:  No c/o numbness or tingling R LE  Tone: WFL   Edema: none noted    Comments: Upon arrival patient lying in bed. Therapist educated pt on role of OT. RN clearance. At end of session, patient seated EOB (RN present) with call light and phone within reach, all lines and tubes intact. Overall patient demonstrated decreased independence and safety during completion of ADL/functional transfer/mobility tasks. Pt would benefit from continued skilled OT to increase safety and independence with completion of ADL/IADL tasks for functional independence and quality of life.     Treatment: OT treatment provided this date includes: Facilitation of bed mobility (education/cues for body mechanics, NWB L LE), unsupported sitting balance (addressing posture, weight shifting, dynamic reaching to prep for ADL's), functional transfers (various surfaces w/ education/cues for safety/hand placement, NWB L LE), standing tolerance tasks (addressing posture, balance and activity tolerance while incorporating light functional reaching impacting ADLs and functional activity) and light functional ambulation tasks with w/w (w/ education/cuing on posture, w/w management and safety). Therapist facilitated self-care retraining: UB/LB self-care tasks and grooming tasks while educating/cuing pt on modified techniques within precautions, posture, safety and energy conservation techniques. Skilled monitoring of HR, O2 sats and pts response to treatment. Rehab Potential: Good for established goals     Patient / Family Goal: to return home    Patient and/or family were instructed on functional diagnosis, prognosis/goals and OT plan of care. Demonstrated fair- understanding. Eval Complexity: Low    Time In: 14:42  Time Out: 15:05  Total Treatment Time: 10 minutes    Min Units   OT Eval Low 97165  X  1   OT Eval Medium 84720      OT Eval High 33117      OT Re-Eval [id number]       Therapeutic Ex 23034       Therapeutic Activities 02512  10  1   ADL/Self Care 07020       Orthotic Management 06064       Manual [id number]     Neuro Re-Ed 06176       Non-Billable Time          Evaluation Time additionally includes thorough review of current medical information, gathering information on past medical history/social history and prior level of function, interpretation of standardized testing/informal observation of tasks, assessment of data and development of plan of care and goals.         MENDOZA ColmenaresR/L #[id number]

## 2022-05-16 NOTE — PROGRESS NOTES
Pt educated today not to put weight on left foot due to Left tibial plateau fracture - pt states he does not wear the hinged knee brace prescribed by Ortho.

## 2022-05-16 NOTE — PROGRESS NOTES
Hospitalist Progress Note      PCP: No primary care provider on file. Date of Admission: 5/12/2022    Chief Complaint: hematoma 2/2 INR      Subjective: Pt was seen at bedside and appears to be doing well, swelling has improved. INR has started to increase to 1.6. RESTARTED warfarin       Medications:  Reviewed    Infusion Medications    sodium chloride      lactated ringers 100 mL/hr at 05/16/22 6002     Scheduled Medications    warfarin  2.5 mg Oral Once    gabapentin  300 mg Oral TID    methocarbamol  1,500 mg Oral 4x Daily    warfarin placeholder: dosing by pharmacy   Other RX Placeholder    ARIPiprazole  30 mg Oral Daily    metoprolol succinate  25 mg Oral Daily    rosuvastatin  40 mg Oral Daily    sodium chloride flush  10 mL IntraVENous 2 times per day    aspirin  81 mg Oral Daily     PRN Meds: HYDROcodone 5 mg - acetaminophen **OR** HYDROcodone 5 mg - acetaminophen, sodium chloride flush, sodium chloride, promethazine **OR** [DISCONTINUED] ondansetron, polyethylene glycol      Intake/Output Summary (Last 24 hours) at 5/16/2022 1312  Last data filed at 5/16/2022 0800  Gross per 24 hour   Intake 2548.16 ml   Output 1600 ml   Net 948.16 ml       Exam:    BP (!) 146/84   Pulse 70   Temp 97.4 °F (36.3 °C) (Oral)   Resp 18   Ht 5' 9\" (1.753 m)   Wt 245 lb (111.1 kg)   SpO2 100%   BMI 36.18 kg/m²      Physical examination  General appearance-obese, appears stated age  Respiratory-clear to auscultation bilaterally no added sounds  Cardiovascular-S1-S2 heard no murmurs  Abdomen-soft nontender, nondistended, bowel sounds heard  Extremities-right lower extremity medial aspect shows swelling improved,  nonerythematous nontender. Numbness present at the distal aspect.   Left lower extremity within normal limits  Skin-normal skin color no rashes or lesions noted  Neurologic-no focal deficits , alert and oriented    Labs:   Recent Labs     05/14/22  0605 05/15/22  0559 05/16/22  0438   WBC 7.1 6.0 5.7   HGB 10.5* 9.9* 9.6*   HCT 32.8* 31.7* 31.3*    120* 132     Recent Labs     05/14/22  0605 05/15/22  0559 05/16/22  0438    138 137   K 3.6 3.8 4.3    105 104   CO2 22 24 22   BUN 26* 26* 23*   CREATININE 1.7* 1.8* 1.7*   CALCIUM 8.1* 8.3* 8.6     Recent Labs     05/14/22  0605 05/15/22  0559 05/16/22 0438   AST 39 43* 44*   ALT 31 32 31   BILITOT 0.8 1.2 0.8   ALKPHOS 152* 161* 162*     Recent Labs     05/14/22  0605 05/15/22  0559 05/16/22  0438   INR 1.3 1.3 1.6     No results for input(s): CKTOTAL, TROPONINI in the last 72 hours. Assessment/Plan:    Active Hospital Problems    Diagnosis Date Noted    Retroperitoneal hematoma [K66.1] 05/12/2022     Priority: Medium     -Retroperitoneal hemorrhage  -Supratherapeutic INR  -Elevated troponin  -Hypokalemia  -Hypomagnesemia  -FRANTZ over CKD stage III  -Chronically anticoagulated on warfarin  -Hypertension  -Hyperlipidemia        PLAN:  -Consult general surgery-no active surgical intervention at this time, restarted back coumadin. US ordered for ruling out DVT- No DVT  -Monitor INR levels-improved to 1.6, pharmacy consulted for warfarin   -Monitor hemoglobin levels-currently at 9.9  -Monitor serum electrolytes replete as needed  -Telemetry  -Repeat troponin trending and elevated- consulted cardiology- no acute interventions  -Lactated Ringer's at 75 mL/hr increased to 100cc/hr  -Continue home medications  - cr function at 1.7      DVT Prophylaxis:coumadin  Diet: ADULT DIET;  Regular; Low Fat/Low Chol/High Fiber/RIVERA  Code Status: Full Code    Dispo - awaiting clinical improvement, frantz, coumadin, INR still at  1.6 with a goal of 2.5-3.5    Keyur Moncada MD

## 2022-05-17 LAB
ALBUMIN SERPL-MCNC: 3.4 G/DL (ref 3.5–5.2)
ALP BLD-CCNC: 175 U/L (ref 40–129)
ALT SERPL-CCNC: 28 U/L (ref 0–40)
ANION GAP SERPL CALCULATED.3IONS-SCNC: 13 MMOL/L (ref 7–16)
AST SERPL-CCNC: 33 U/L (ref 0–39)
BASOPHILS ABSOLUTE: 0.04 E9/L (ref 0–0.2)
BASOPHILS RELATIVE PERCENT: 0.6 % (ref 0–2)
BILIRUB SERPL-MCNC: 0.8 MG/DL (ref 0–1.2)
BUN BLDV-MCNC: 21 MG/DL (ref 6–20)
CALCIUM SERPL-MCNC: 8.7 MG/DL (ref 8.6–10.2)
CHLORIDE BLD-SCNC: 105 MMOL/L (ref 98–107)
CO2: 21 MMOL/L (ref 22–29)
CREAT SERPL-MCNC: 1.5 MG/DL (ref 0.7–1.2)
EOSINOPHILS ABSOLUTE: 0.3 E9/L (ref 0.05–0.5)
EOSINOPHILS RELATIVE PERCENT: 4.9 % (ref 0–6)
GFR AFRICAN AMERICAN: 59
GFR NON-AFRICAN AMERICAN: 48 ML/MIN/1.73
GLUCOSE BLD-MCNC: 92 MG/DL (ref 74–99)
HCT VFR BLD CALC: 32.7 % (ref 37–54)
HEMOGLOBIN: 10.2 G/DL (ref 12.5–16.5)
IMMATURE GRANULOCYTES #: 0.05 E9/L
IMMATURE GRANULOCYTES %: 0.8 % (ref 0–5)
INR BLD: 1.7
LYMPHOCYTES ABSOLUTE: 1.04 E9/L (ref 1.5–4)
LYMPHOCYTES RELATIVE PERCENT: 16.9 % (ref 20–42)
MCH RBC QN AUTO: 32.5 PG (ref 26–35)
MCHC RBC AUTO-ENTMCNC: 31.2 % (ref 32–34.5)
MCV RBC AUTO: 104.1 FL (ref 80–99.9)
MONOCYTES ABSOLUTE: 0.83 E9/L (ref 0.1–0.95)
MONOCYTES RELATIVE PERCENT: 13.5 % (ref 2–12)
NEUTROPHILS ABSOLUTE: 3.9 E9/L (ref 1.8–7.3)
NEUTROPHILS RELATIVE PERCENT: 63.3 % (ref 43–80)
PDW BLD-RTO: 16.8 FL (ref 11.5–15)
PLATELET # BLD: 151 E9/L (ref 130–450)
PMV BLD AUTO: 11 FL (ref 7–12)
POTASSIUM REFLEX MAGNESIUM: 4.3 MMOL/L (ref 3.5–5)
PROTHROMBIN TIME: 18.8 SEC (ref 9.3–12.4)
RBC # BLD: 3.14 E12/L (ref 3.8–5.8)
SODIUM BLD-SCNC: 139 MMOL/L (ref 132–146)
TOTAL PROTEIN: 6.7 G/DL (ref 6.4–8.3)
WBC # BLD: 6.2 E9/L (ref 4.5–11.5)

## 2022-05-17 PROCEDURE — 2580000003 HC RX 258: Performed by: FAMILY MEDICINE

## 2022-05-17 PROCEDURE — 97530 THERAPEUTIC ACTIVITIES: CPT

## 2022-05-17 PROCEDURE — 6370000000 HC RX 637 (ALT 250 FOR IP): Performed by: SURGERY

## 2022-05-17 PROCEDURE — 80053 COMPREHEN METABOLIC PANEL: CPT

## 2022-05-17 PROCEDURE — 6370000000 HC RX 637 (ALT 250 FOR IP): Performed by: STUDENT IN AN ORGANIZED HEALTH CARE EDUCATION/TRAINING PROGRAM

## 2022-05-17 PROCEDURE — 97161 PT EVAL LOW COMPLEX 20 MIN: CPT

## 2022-05-17 PROCEDURE — 36415 COLL VENOUS BLD VENIPUNCTURE: CPT

## 2022-05-17 PROCEDURE — 6370000000 HC RX 637 (ALT 250 FOR IP): Performed by: NURSE PRACTITIONER

## 2022-05-17 PROCEDURE — 85610 PROTHROMBIN TIME: CPT

## 2022-05-17 PROCEDURE — 85025 COMPLETE CBC W/AUTO DIFF WBC: CPT

## 2022-05-17 PROCEDURE — 6370000000 HC RX 637 (ALT 250 FOR IP): Performed by: FAMILY MEDICINE

## 2022-05-17 PROCEDURE — 2140000000 HC CCU INTERMEDIATE R&B

## 2022-05-17 PROCEDURE — 6370000000 HC RX 637 (ALT 250 FOR IP): Performed by: INTERNAL MEDICINE

## 2022-05-17 RX ORDER — GABAPENTIN 300 MG/1
300 CAPSULE ORAL 3 TIMES DAILY
Qty: 90 CAPSULE | Refills: 0 | Status: SHIPPED | OUTPATIENT
Start: 2022-05-17 | End: 2022-05-18

## 2022-05-17 RX ORDER — TRAMADOL HYDROCHLORIDE 50 MG/1
50 TABLET ORAL ONCE
Status: COMPLETED | OUTPATIENT
Start: 2022-05-17 | End: 2022-05-17

## 2022-05-17 RX ORDER — WARFARIN SODIUM 2.5 MG/1
2.5 TABLET ORAL
Status: COMPLETED | OUTPATIENT
Start: 2022-05-17 | End: 2022-05-17

## 2022-05-17 RX ADMIN — GABAPENTIN 300 MG: 300 CAPSULE ORAL at 21:17

## 2022-05-17 RX ADMIN — SODIUM CHLORIDE, PRESERVATIVE FREE 10 ML: 5 INJECTION INTRAVENOUS at 21:00

## 2022-05-17 RX ADMIN — TRAMADOL HYDROCHLORIDE 50 MG: 50 TABLET, COATED ORAL at 21:42

## 2022-05-17 RX ADMIN — METHOCARBAMOL 1500 MG: 750 TABLET ORAL at 13:00

## 2022-05-17 RX ADMIN — METHOCARBAMOL 1500 MG: 750 TABLET ORAL at 18:00

## 2022-05-17 RX ADMIN — WARFARIN SODIUM 2.5 MG: 2.5 TABLET ORAL at 18:00

## 2022-05-17 RX ADMIN — METHOCARBAMOL 1500 MG: 750 TABLET ORAL at 21:17

## 2022-05-17 RX ADMIN — METHOCARBAMOL 1500 MG: 750 TABLET ORAL at 09:12

## 2022-05-17 RX ADMIN — METOPROLOL SUCCINATE 25 MG: 25 TABLET, EXTENDED RELEASE ORAL at 09:00

## 2022-05-17 RX ADMIN — GABAPENTIN 300 MG: 300 CAPSULE ORAL at 13:35

## 2022-05-17 RX ADMIN — ARIPIPRAZOLE 30 MG: 15 TABLET ORAL at 09:12

## 2022-05-17 RX ADMIN — ASPIRIN 81 MG: 81 TABLET, COATED ORAL at 10:13

## 2022-05-17 RX ADMIN — ROSUVASTATIN 40 MG: 20 TABLET, FILM COATED ORAL at 10:13

## 2022-05-17 RX ADMIN — GABAPENTIN 300 MG: 300 CAPSULE ORAL at 09:11

## 2022-05-17 ASSESSMENT — PAIN DESCRIPTION - ORIENTATION
ORIENTATION: LOWER

## 2022-05-17 ASSESSMENT — PAIN DESCRIPTION - PAIN TYPE: TYPE: ACUTE PAIN

## 2022-05-17 ASSESSMENT — PAIN DESCRIPTION - DESCRIPTORS
DESCRIPTORS: ACHING
DESCRIPTORS: ACHING;DISCOMFORT
DESCRIPTORS: ACHING
DESCRIPTORS: ACHING

## 2022-05-17 ASSESSMENT — PAIN SCALES - GENERAL
PAINLEVEL_OUTOF10: 5
PAINLEVEL_OUTOF10: 3
PAINLEVEL_OUTOF10: 8
PAINLEVEL_OUTOF10: 5
PAINLEVEL_OUTOF10: 7
PAINLEVEL_OUTOF10: 8

## 2022-05-17 ASSESSMENT — PAIN DESCRIPTION - LOCATION
LOCATION: LEG;BACK
LOCATION: BACK

## 2022-05-17 ASSESSMENT — PAIN DESCRIPTION - FREQUENCY: FREQUENCY: INTERMITTENT

## 2022-05-17 ASSESSMENT — PAIN DESCRIPTION - ONSET: ONSET: ON-GOING

## 2022-05-17 ASSESSMENT — PAIN - FUNCTIONAL ASSESSMENT: PAIN_FUNCTIONAL_ASSESSMENT: ACTIVITIES ARE NOT PREVENTED

## 2022-05-17 NOTE — PROGRESS NOTES
Pharmacy Consultation Note  (Warfarin Dosing and Monitoring)    Initial consult date: 5/14/2022  Consulting Provider: Skye Mosley MD    Jenifer Velasco is a 55 y.o. male for whom pharmacy has been asked to manage warfarin therapy. Weight:   Wt Readings from Last 1 Encounters:   05/11/22 245 lb (111.1 kg)       TSH:    Lab Results   Component Value Date    TSH 1.990 05/12/2022       Hepatic Function Panel:                            Lab Results   Component Value Date    ALKPHOS 175 05/17/2022    ALT 28 05/17/2022    AST 33 05/17/2022    PROT 6.7 05/17/2022    BILITOT 0.8 05/17/2022    BILIDIR <0.2 04/05/2022    IBILI see below 04/05/2022    LABALBU 3.4 05/17/2022       Current warfarin drug-drug interactions include: vitamin k administration/TPN/tube feeds (decr INR)     Vitamin K 5 mg IV x 1 given 5/12  Kcentra 5000 units IV x1 given 5/12    Recent Labs     05/15/22  0559 05/16/22  0438 05/17/22  0549   HGB 9.9* 9.6* 10.2*   * 132 151     Date Warfarin Dose INR Heparin or LMWH Comment   5/11 -- >10     5/12 -- 6.8  Vitamin K, KCentra   5/13 -- 1.4     5/14 5 mg 1.3 None Pharmacy consulted   5/15 5 mg 1.3 None    5/16 2.5 mg 1.6 None    5/17 <2.5 mg> 1.7 None             Assessment:  · Patient is a 55 y.o. male on warfarin for mechanical valves x2: AVR (#23 [employer] bileaflet mechanical) and MVR (#29 [employer] bileaflet mechanical valve)  · Patient's home warfarin dosing regimen is 5 mg on Sat/Sun, and 2.5 mg all other days - decreased during admission in March 2022. · Goal INR 2.5 - 3.5   · Patient's INR > 10 on admission, admitted for retroperitoneal bleed, warfarin reversed w/ kcentra and vitamin K in the ED. · 5/14: INR 1.3  Today, Hgb 10.5/ Hct 32.8, anticipate transient warfarin resistance following vitamin K administration, patient will likely require a reduction in home regimen due to supratherapeutic INR on admission. Pending US of RLE to rule out DVT.    · 5/15: INR 1.3, no DVT on US    Plan:  · Warfarin 2.5 mg x 1  · Daily PT/INR until the INR is stable within the therapeutic range  · Pharmacist will follow and monitor/adjust dosing as necessary    Thank you for this consult,    Елена Alva, PharmD 5/17/2022 8:04 AM

## 2022-05-17 NOTE — PROGRESS NOTES
Hospitalist Progress Note            Patient: Jonh Carreon Age: 55 y.o.   DOA: 5/12/2022 Admit Dx / CC: Retroperitoneal hematoma [K66.1]  Right leg pain [M79.604]  Warfarin-induced coagulopathy ([facility] [address] [D68.32, O15.909L]  Abdominal pain, unspecified abdominal location [R10.9]  Chest pain, unspecified type [R07.9]   LOS:  LOS: 5 days      Assessment/ Plan:     Spontaneous Retroperitoneal hemorrhage  Pt was admitted with Supratherapeutic INR >10  INR today 1.7 after coumadin resumption  H/H stable  General surgery s/o    FRANTZ (resolved) on CKD stage III  Baseline Cr 1.5-1.7    Hx/o mechanical AVR/MVR in 2020 on coumadin  Pt follows with coumadin clinic  Pt is on 2.5 mg 5x/week and 5 mg 2x/wk  Cont with coumadin dose per pharmacy    HTN/HLD  Cont home meds    RLE edema  PVL RLE negative for DVT    DVT ppx: coumadin  Code status: Full code    Plan discharge tomorrow    Plan of care discussed with: patient and bedside RN, CM    Patient Active Problem List   Diagnosis    Chest pain    Closed fracture of nasal bones    Injury of face    Retroperitoneal hematoma        Medications:  Reviewed    Infusion Medications    sodium chloride       Scheduled Medications    warfarin  2.5 mg Oral Once    gabapentin  300 mg Oral TID    methocarbamol  1,500 mg Oral 4x Daily    warfarin placeholder: dosing by pharmacy   Other RX Placeholder    ARIPiprazole  30 mg Oral Daily    metoprolol succinate  25 mg Oral Daily    rosuvastatin  40 mg Oral Daily    sodium chloride flush  10 mL IntraVENous 2 times per day    aspirin  81 mg Oral Daily     PRN Meds: HYDROcodone 5 mg - acetaminophen, sodium chloride flush, sodium chloride, promethazine **OR** [DISCONTINUED] ondansetron, polyethylene glycol    I/O    Intake/Output Summary (Last 24 hours) at 5/17/2022 1259  Last data filed at 5/16/2022 1706  Gross per 24 hour   Intake 240 ml   Output --   Net 240 ml       Labs:   Recent Labs     05/15/22  0559 05/16/22  0438 05/17/22  0549   WBC 6.0 5.7 6.2   HGB 9.9* 9.6* 10.2*   HCT 31.7* 31.3* 32.7*   * 132 151       Recent Labs     05/15/22  0559 05/16/22  0438 05/17/22  0549    137 139   K 3.8 4.3 4.3    104 105   CO2 24 22 21*   BUN 26* 23* 21*   CREATININE 1.8* 1.7* 1.5*   CALCIUM 8.3* 8.6 8.7       Recent Labs     05/15/22  0559 05/16/22  0438 05/17/22  0549   PROT 6.4 6.4 6.7   ALKPHOS 161* 162* 175*   ALT 32 31 28   AST 43* 44* 33   BILITOT 1.2 0.8 0.8       Recent Labs     05/15/22  0559 05/16/22  0438 05/17/22  0549   INR 1.3 1.6 1.7       No results for input(s): Klaus Hartman in the last 72 hours. Chronic labs:  Lab Results   Component Value Date    CHOL 86 03/24/2022    TRIG 149 03/24/2022    HDL 28 03/24/2022    LDLCALC 28 03/24/2022    TSH 1.990 05/12/2022    INR 1.7 05/17/2022    LABA1C 5.2 03/24/2022       Radiology:  Imaging studies reviewed today. Subjective:     Ainsley Curtis is adamant on going home but agrees to another night in the hospital to monitor INR    Objective:     Physical Exam:   BP (!) 162/83   Pulse 80   Temp 98.2 °F (36.8 °C) (Oral)   Resp 18   Ht 5' 9\" (1.753 m)   Wt 245 lb (111.1 kg)   SpO2 97%   BMI 36.18 kg/m²     General appearance:in no distress   Lungs: Clear to auscultation bilaterally, no wheezing or crackles   Heart: Regular rate and rhythm, S1, S2 normal   Abdomen: Soft, non-tender and not-distended.  Bowel sounds normal.   Extremities: no edema   Skin: Skin color, texture, turgor normal. No rashes or lesions   Neurologic: Grossly normal and non focal, CN II-XII intact       Rufino Heart MD   Hospitalist Service   05/17/22 12:59 PM

## 2022-05-17 NOTE — PROGRESS NOTES
Physical Therapy  Physical Therapy Initial Assessment     Name: Amberly Rudolph  : 1965  MRN: [id number]      Date of Service: 2022    Evaluating PT:  Ana Villareal PT, DPT [id number]     Room #:  4766/6445-D  Diagnosis:  Retroperitoneal hematoma [K66.1]  Right leg pain [M79.604]  Warfarin-induced coagulopathy ([facility] [address] [D68.32, T45.515A]  Abdominal pain, unspecified abdominal location [R10.9]  Chest pain, unspecified type [R07.9]  Reason for admission: RLE pain, former L tibial plateau fx  Precautions:  Falls, LLE NWB, LLE hinged knee brace, impulsivity    Procedure/Surgery:  None  Equipment Recommendations:  FWW (pt owns)    SUBJECTIVE:  Pt lives with 3 roommates in a 2 story home with 7 CECIL 1 rail. 2nd floor setup with flight and rail up. Pt ambulated with FWW0 PTA - apparently stopped using WW and hinged knee brace recently and was weight bearing despite being advised not to. OBJECTIVE:   Initial Evaluation  Date:  Treatment   Short Term/ Long Term   Goals   AM-PAC 6 Clicks 80/07     Was pt agreeable to Eval/treatment? Yes      Does pt have pain?  4/10 R thigh      Bed Mobility  Rolling: NT  Supine to sit: Supervision  Sit to supine: NT  Scooting: Supervision  Independent    Transfers Sit to stand: Sujey  Stand to sit: Sujey  Stand pivot: Sujey no AD  Independent    Ambulation    20 feet with [employer] Sujey  -cues and assist to maintain NWB to LLE  >75 feet with AAD Mod I    Stair negotiation: ascended and descended  NT  10 steps with rail and AAD Mod I      LE ROM: WFL    LE Strength:   knee ext:   ankle DF:     Balance:   Sitting static: Independent  Sitting dynamic: Independent  Standing static: Sujey [employer]  Standing dynamic: Sujey [employer]    -Pt is A & O x 3  -Sensation:  Pt denies numbness and tingling to extremities  -Edema:  unremarkable     Therapeutic Exercises:  Functional activity     Patient education  Pt educated on safety, sequencing of transfers, and role of PT    Patient response to education: Pt verbalized understanding Pt demonstrated skill Pt requires further education in this area   Yes  Partial  Yes      ASSESSMENT:  Conditions Requiring Skilled Therapeutic Intervention:  [x]Decreased strength     []Decreased ROM  [x]Decreased functional mobility  [x]Decreased balance   [x]Decreased endurance   []Decreased posture  []Decreased sensation  []Decreased coordination   []Decreased vision  [x]Decreased safety awareness   [x]Increased pain     Comments:  Pt received supine in bed and agreeable to PT session   Pt educated on NWB status to LLE again and needed reminders frequently during session to maintain this. He was unable to maintain proper precautions during ambulation and fatigued quickly, resulting in worse performance as time in standing increased. He was able to hop a short distance NWB but this quickly deteriorated to putting weight on L foot. He otherwise showed fair strength to RLE and UEs for short time. Sitting in chair to end session. Discussed with pt the concerns for short ambulation distance, going home and maintaining NWB status, navigating stairs, etc.   Pt with all needs met and call light in reach. Pt would benefit from continued PT POC to address functional deficits described above. Treatment:  Patient practiced and was instructed in the following treatment:     Therapeutic activity  o Patient education provided continuously throughout session for sequencing, safety maintenance, and improving any deficits found during the evaluation.   o Bed mobility training - pt given verbal and tactile cues to facilitate proper sequencing and safety during rolling and supine>sit    o STS and pivot transfer training - pt educated on proper hand and foot placement, safety and sequencing, and use of proper technique to safely complete sit<>stand and pivot transfers with hands on assistance to complete task safely   o Gait training- pt was given verbal and tactile cues to facilitate improved ambulation distance maintaining NWB to LLE during ambulation as well as provided with physical assistance. Pt's/ family goals   1. Return home     Patient and or family understand(s) diagnosis, prognosis, and plan of care. yes    Prognosis is good for reaching above PT goals. PHYSICAL THERAPY PLAN OF CARE:    PT POC is established based on physician order and patient diagnosis     Referring provider/PT Order:  05/14/22 0645   PT eval and treat Start: 05/14/22 0645, End: 05/14/22 0645, ONE TIME, Standing Count: 1 Occurrences, Fay Benton MD    Diagnosis:  Retroperitoneal hematoma [K66.1]  Right leg pain [M79.604]  Warfarin-induced coagulopathy ([facility] [address] [D68.32, T45.515A]  Abdominal pain, unspecified abdominal location [R10.9]  Chest pain, unspecified type [R07.9]  Specific instructions for next treatment:  Progress ambulation with proper precautions. Current Treatment Recommendations:   [] Strengthening to improve independence with functional mobility   [] ROM to improve independence with functional mobility   [x] Balance Training to improve static/dynamic balance and to reduce fall risk  [x] Endurance Training to improve activity tolerance during functional mobility   [x] Transfer Training to improve safety and independence with all functional transfers   [x] Gait Training to improve gait mechanics, endurance and asses need for appropriate assistive device  [x] Stair Training in preparation for safe discharge home and/or into the community   [x] Positioning to prevent skin breakdown and contractures  [x] Safety and Education Training   [] Patient/Caregiver Education   [] HEP  [] Other     PT long term treatment goals are located in above grid    Frequency of treatments: 2-5x/week x 1-2 weeks.     Time in  0730  Time out  0750    Total Treatment Time  10 minutes     Evaluation Time includes thorough review of current medical information, gathering information on past medical history/social history and prior level of function, completion of standardized testing/informal observation of tasks, assessment of data and education on plan of care and goals.     CPT codes:  [x] Low Complexity PT evaluation 19996  [] Moderate Complexity PT evaluation 04540  [] High Complexity PT evaluation 73615  [] PT Re-evaluation 12390  [] Gait training 06257 - minutes  [] Manual therapy 85504 - minutes  [x] Therapeutic activities 67155 10 minutes  [] Therapeutic exercises 77089 - minutes  [] Neuromuscular reeducation 77435 - minutes     Sivakumar Cochran, PT, DPT  [id number]

## 2022-05-18 PROBLEM — E55.9 VITAMIN D DEFICIENCY: Status: ACTIVE | Noted: 2022-05-18

## 2022-05-18 LAB
ALBUMIN SERPL-MCNC: 3.5 G/DL (ref 3.5–5.2)
ALP BLD-CCNC: 168 U/L (ref 40–129)
ALT SERPL-CCNC: 28 U/L (ref 0–40)
ANION GAP SERPL CALCULATED.3IONS-SCNC: 15 MMOL/L (ref 7–16)
AST SERPL-CCNC: 32 U/L (ref 0–39)
BASOPHILS ABSOLUTE: 0.06 E9/L (ref 0–0.2)
BASOPHILS RELATIVE PERCENT: 1 % (ref 0–2)
BILIRUB SERPL-MCNC: 0.8 MG/DL (ref 0–1.2)
BUN BLDV-MCNC: 21 MG/DL (ref 6–20)
CALCIUM SERPL-MCNC: 8.9 MG/DL (ref 8.6–10.2)
CHLORIDE BLD-SCNC: 105 MMOL/L (ref 98–107)
CO2: 17 MMOL/L (ref 22–29)
CREAT SERPL-MCNC: 1.5 MG/DL (ref 0.7–1.2)
EOSINOPHILS ABSOLUTE: 0.34 E9/L (ref 0.05–0.5)
EOSINOPHILS RELATIVE PERCENT: 5.7 % (ref 0–6)
FOLATE: 8.9 NG/ML (ref 4.8–24.2)
GFR AFRICAN AMERICAN: 59
GFR NON-AFRICAN AMERICAN: 48 ML/MIN/1.73
GLUCOSE BLD-MCNC: 81 MG/DL (ref 74–99)
HCT VFR BLD CALC: 35 % (ref 37–54)
HEMOGLOBIN: 10.8 G/DL (ref 12.5–16.5)
IMMATURE GRANULOCYTES #: 0.05 E9/L
IMMATURE GRANULOCYTES %: 0.8 % (ref 0–5)
INR BLD: 1.4
IRON SATURATION: 22 % (ref 20–55)
IRON: 75 MCG/DL (ref 59–158)
LYMPHOCYTES ABSOLUTE: 0.99 E9/L (ref 1.5–4)
LYMPHOCYTES RELATIVE PERCENT: 16.6 % (ref 20–42)
MAGNESIUM: 2.1 MG/DL (ref 1.6–2.6)
MCH RBC QN AUTO: 32.1 PG (ref 26–35)
MCHC RBC AUTO-ENTMCNC: 30.9 % (ref 32–34.5)
MCV RBC AUTO: 104.2 FL (ref 80–99.9)
MONOCYTES ABSOLUTE: 0.83 E9/L (ref 0.1–0.95)
MONOCYTES RELATIVE PERCENT: 13.9 % (ref 2–12)
NEUTROPHILS ABSOLUTE: 3.69 E9/L (ref 1.8–7.3)
NEUTROPHILS RELATIVE PERCENT: 62 % (ref 43–80)
PDW BLD-RTO: 16.7 FL (ref 11.5–15)
PLATELET # BLD: 181 E9/L (ref 130–450)
PMV BLD AUTO: 11.2 FL (ref 7–12)
POTASSIUM REFLEX MAGNESIUM: 4 MMOL/L (ref 3.5–5)
PROTHROMBIN TIME: 15.7 SEC (ref 9.3–12.4)
RBC # BLD: 3.36 E12/L (ref 3.8–5.8)
SODIUM BLD-SCNC: 137 MMOL/L (ref 132–146)
TOTAL IRON BINDING CAPACITY: 343 MCG/DL (ref 250–450)
TOTAL PROTEIN: 6.8 G/DL (ref 6.4–8.3)
VITAMIN B-12: 772 PG/ML (ref 211–946)
VITAMIN D 25-HYDROXY: 13 NG/ML (ref 30–100)
WBC # BLD: 6 E9/L (ref 4.5–11.5)

## 2022-05-18 PROCEDURE — 36415 COLL VENOUS BLD VENIPUNCTURE: CPT

## 2022-05-18 PROCEDURE — 97530 THERAPEUTIC ACTIVITIES: CPT

## 2022-05-18 PROCEDURE — 82306 VITAMIN D 25 HYDROXY: CPT

## 2022-05-18 PROCEDURE — 2140000000 HC CCU INTERMEDIATE R&B

## 2022-05-18 PROCEDURE — 85025 COMPLETE CBC W/AUTO DIFF WBC: CPT

## 2022-05-18 PROCEDURE — 82607 VITAMIN B-12: CPT

## 2022-05-18 PROCEDURE — 83540 ASSAY OF IRON: CPT

## 2022-05-18 PROCEDURE — 6360000002 HC RX W HCPCS: Performed by: INTERNAL MEDICINE

## 2022-05-18 PROCEDURE — 83735 ASSAY OF MAGNESIUM: CPT

## 2022-05-18 PROCEDURE — 82746 ASSAY OF FOLIC ACID SERUM: CPT

## 2022-05-18 PROCEDURE — 6370000000 HC RX 637 (ALT 250 FOR IP): Performed by: NURSE PRACTITIONER

## 2022-05-18 PROCEDURE — 2580000003 HC RX 258: Performed by: FAMILY MEDICINE

## 2022-05-18 PROCEDURE — 6370000000 HC RX 637 (ALT 250 FOR IP): Performed by: STUDENT IN AN ORGANIZED HEALTH CARE EDUCATION/TRAINING PROGRAM

## 2022-05-18 PROCEDURE — 6370000000 HC RX 637 (ALT 250 FOR IP): Performed by: SURGERY

## 2022-05-18 PROCEDURE — 6370000000 HC RX 637 (ALT 250 FOR IP): Performed by: INTERNAL MEDICINE

## 2022-05-18 PROCEDURE — 85610 PROTHROMBIN TIME: CPT

## 2022-05-18 PROCEDURE — 97535 SELF CARE MNGMENT TRAINING: CPT

## 2022-05-18 PROCEDURE — 83550 IRON BINDING TEST: CPT

## 2022-05-18 PROCEDURE — 80053 COMPREHEN METABOLIC PANEL: CPT

## 2022-05-18 PROCEDURE — 6370000000 HC RX 637 (ALT 250 FOR IP): Performed by: FAMILY MEDICINE

## 2022-05-18 RX ORDER — ERGOCALCIFEROL 1.25 MG/1
50000 CAPSULE ORAL WEEKLY
Status: DISCONTINUED | OUTPATIENT
Start: 2022-05-18 | End: 2022-05-20 | Stop reason: HOSPADM

## 2022-05-18 RX ORDER — WARFARIN SODIUM 2.5 MG/1
2.5 TABLET ORAL DAILY
Qty: 30 TABLET | Refills: 0
Start: 2022-05-18 | End: 2022-05-18 | Stop reason: SDUPTHER

## 2022-05-18 RX ORDER — MECOBALAMIN 5000 MCG
5 TABLET,DISINTEGRATING ORAL NIGHTLY PRN
Status: DISCONTINUED | OUTPATIENT
Start: 2022-05-18 | End: 2022-05-20 | Stop reason: HOSPADM

## 2022-05-18 RX ORDER — TIZANIDINE 4 MG/1
4 TABLET ORAL EVERY 6 HOURS PRN
Status: DISCONTINUED | OUTPATIENT
Start: 2022-05-18 | End: 2022-05-20 | Stop reason: HOSPADM

## 2022-05-18 RX ORDER — WARFARIN SODIUM 2.5 MG/1
2.5 TABLET ORAL DAILY
Qty: 30 TABLET | Refills: 0 | Status: ON HOLD
Start: 2022-05-18 | End: 2022-08-29

## 2022-05-18 RX ORDER — OXYCODONE HYDROCHLORIDE 5 MG/1
7.5 TABLET ORAL EVERY 4 HOURS PRN
Status: DISCONTINUED | OUTPATIENT
Start: 2022-05-18 | End: 2022-05-20 | Stop reason: HOSPADM

## 2022-05-18 RX ORDER — ENOXAPARIN SODIUM 150 MG/ML
1 INJECTION SUBCUTANEOUS 2 TIMES DAILY
Qty: 14 EACH | Refills: 0 | Status: SHIPPED | OUTPATIENT
Start: 2022-05-18 | End: 2022-05-25

## 2022-05-18 RX ORDER — CHOLECALCIFEROL (VITAMIN D3) 50 MCG
2000 TABLET ORAL DAILY
Status: DISCONTINUED | OUTPATIENT
Start: 2022-05-18 | End: 2022-05-20 | Stop reason: HOSPADM

## 2022-05-18 RX ORDER — ERGOCALCIFEROL 1.25 MG/1
50000 CAPSULE ORAL WEEKLY
Qty: 12 CAPSULE | Refills: 0 | Status: SHIPPED | OUTPATIENT
Start: 2022-05-18 | End: 2022-08-22

## 2022-05-18 RX ORDER — ACETAMINOPHEN 500 MG
1000 TABLET ORAL 4 TIMES DAILY
Status: DISCONTINUED | OUTPATIENT
Start: 2022-05-18 | End: 2022-05-20 | Stop reason: HOSPADM

## 2022-05-18 RX ORDER — OXYCODONE HYDROCHLORIDE 5 MG/1
5 TABLET ORAL EVERY 4 HOURS PRN
Status: DISCONTINUED | OUTPATIENT
Start: 2022-05-18 | End: 2022-05-19

## 2022-05-18 RX ORDER — WARFARIN SODIUM 5 MG/1
5 TABLET ORAL
Status: DISCONTINUED | OUTPATIENT
Start: 2022-05-18 | End: 2022-05-18 | Stop reason: SDUPTHER

## 2022-05-18 RX ORDER — CHOLECALCIFEROL (VITAMIN D3) 50 MCG
2000 TABLET ORAL DAILY
Qty: 30 TABLET | Refills: 0 | Status: SHIPPED | OUTPATIENT
Start: 2022-05-18 | End: 2022-08-22

## 2022-05-18 RX ORDER — WARFARIN SODIUM 5 MG/1
5 TABLET ORAL
Status: COMPLETED | OUTPATIENT
Start: 2022-05-18 | End: 2022-05-18

## 2022-05-18 RX ORDER — MECOBALAMIN 5000 MCG
5 TABLET,DISINTEGRATING ORAL NIGHTLY
Status: DISCONTINUED | OUTPATIENT
Start: 2022-05-18 | End: 2022-05-20 | Stop reason: HOSPADM

## 2022-05-18 RX ORDER — GABAPENTIN 300 MG/1
300 CAPSULE ORAL 3 TIMES DAILY
Qty: 90 CAPSULE | Refills: 0 | Status: SHIPPED | OUTPATIENT
Start: 2022-05-18 | End: 2022-08-22

## 2022-05-18 RX ORDER — ENOXAPARIN SODIUM 150 MG/ML
1 INJECTION SUBCUTANEOUS 2 TIMES DAILY
Status: DISCONTINUED | OUTPATIENT
Start: 2022-05-18 | End: 2022-05-20 | Stop reason: HOSPADM

## 2022-05-18 RX ADMIN — HYDROCODONE BITARTRATE AND ACETAMINOPHEN 1 TABLET: 5; 325 TABLET ORAL at 05:21

## 2022-05-18 RX ADMIN — GABAPENTIN 300 MG: 300 CAPSULE ORAL at 16:00

## 2022-05-18 RX ADMIN — ROSUVASTATIN 40 MG: 20 TABLET, FILM COATED ORAL at 08:51

## 2022-05-18 RX ADMIN — Medication 2000 UNITS: at 09:04

## 2022-05-18 RX ADMIN — SODIUM CHLORIDE, PRESERVATIVE FREE 10 ML: 5 INJECTION INTRAVENOUS at 08:54

## 2022-05-18 RX ADMIN — ASPIRIN 81 MG: 81 TABLET, COATED ORAL at 09:04

## 2022-05-18 RX ADMIN — METHOCARBAMOL 1500 MG: 750 TABLET ORAL at 18:30

## 2022-05-18 RX ADMIN — ACETAMINOPHEN 1000 MG: 500 TABLET ORAL at 13:16

## 2022-05-18 RX ADMIN — ERGOCALCIFEROL 50000 UNITS: 1.25 CAPSULE ORAL at 08:48

## 2022-05-18 RX ADMIN — METHOCARBAMOL 1500 MG: 750 TABLET ORAL at 21:14

## 2022-05-18 RX ADMIN — METHOCARBAMOL 1500 MG: 750 TABLET ORAL at 08:52

## 2022-05-18 RX ADMIN — GABAPENTIN 300 MG: 300 CAPSULE ORAL at 21:14

## 2022-05-18 RX ADMIN — SODIUM CHLORIDE, PRESERVATIVE FREE 10 ML: 5 INJECTION INTRAVENOUS at 21:15

## 2022-05-18 RX ADMIN — ACETAMINOPHEN 1000 MG: 500 TABLET ORAL at 18:30

## 2022-05-18 RX ADMIN — ARIPIPRAZOLE 30 MG: 15 TABLET ORAL at 08:51

## 2022-05-18 RX ADMIN — Medication 5 MG: at 21:14

## 2022-05-18 RX ADMIN — GABAPENTIN 300 MG: 300 CAPSULE ORAL at 08:51

## 2022-05-18 RX ADMIN — WARFARIN SODIUM 5 MG: 5 TABLET ORAL at 08:49

## 2022-05-18 RX ADMIN — ENOXAPARIN SODIUM 105 MG: 150 INJECTION SUBCUTANEOUS at 21:14

## 2022-05-18 RX ADMIN — METOPROLOL SUCCINATE 25 MG: 25 TABLET, EXTENDED RELEASE ORAL at 08:53

## 2022-05-18 RX ADMIN — OXYCODONE 5 MG: 5 TABLET ORAL at 13:17

## 2022-05-18 RX ADMIN — ACETAMINOPHEN 1000 MG: 500 TABLET ORAL at 21:14

## 2022-05-18 RX ADMIN — ENOXAPARIN SODIUM 105 MG: 150 INJECTION SUBCUTANEOUS at 09:04

## 2022-05-18 ASSESSMENT — PAIN DESCRIPTION - ONSET
ONSET: ON-GOING
ONSET: ON-GOING

## 2022-05-18 ASSESSMENT — PAIN DESCRIPTION - ORIENTATION
ORIENTATION: ANTERIOR
ORIENTATION: RIGHT

## 2022-05-18 ASSESSMENT — PAIN DESCRIPTION - LOCATION
LOCATION: ANKLE;LEG;BACK
LOCATION: LEG
LOCATION: ANKLE
LOCATION: BACK

## 2022-05-18 ASSESSMENT — PAIN DESCRIPTION - PAIN TYPE: TYPE: ACUTE PAIN

## 2022-05-18 ASSESSMENT — PAIN SCALES - GENERAL
PAINLEVEL_OUTOF10: 0
PAINLEVEL_OUTOF10: 0
PAINLEVEL_OUTOF10: 7
PAINLEVEL_OUTOF10: 7
PAINLEVEL_OUTOF10: 0
PAINLEVEL_OUTOF10: 7
PAINLEVEL_OUTOF10: 5

## 2022-05-18 ASSESSMENT — PAIN DESCRIPTION - FREQUENCY
FREQUENCY: CONTINUOUS
FREQUENCY: CONTINUOUS

## 2022-05-18 ASSESSMENT — PAIN DESCRIPTION - DESCRIPTORS
DESCRIPTORS: ACHING;DISCOMFORT
DESCRIPTORS: ACHING

## 2022-05-18 ASSESSMENT — PAIN - FUNCTIONAL ASSESSMENT: PAIN_FUNCTIONAL_ASSESSMENT: ACTIVITIES ARE NOT PREVENTED

## 2022-05-18 NOTE — PROGRESS NOTES
INR 1.3, no DVT on US  · 5/18: INR 1.4, day 7 after Vitamin K and KCentra. Vitamin K can lead to warfarin resistance for up to a week. Patient has received four days of home dose warfarin thus far. Plan:  · Warfarin 5 mg x 1 tonight (higher than home dose). Would recommend close, short-interval follow up at discharge.    · Daily PT/INR until the INR is stable within the therapeutic range  · Pharmacist will follow and monitor/adjust dosing as necessary    Thank you for this consult,    Chiara Corrales, PharmD 5/18/2022 7:14 AM

## 2022-05-18 NOTE — PROGRESS NOTES
CLINICAL PHARMACY NOTE: MEDS TO BEDS    Total # of Prescriptions Filled: 4   The following medications were delivered to the patient:  · Enoxaparin 120mg/0.8ml  · Vitamin d 2,000 unit tabs  · Vitamin d 1.25mg caps  · Gabapentin 300mg    Additional Documentation:

## 2022-05-18 NOTE — CARE COORDINATION
Per nursing rounds, patient INR 1.4 today; INR levels will continue to be monitored. Plan is home, no needs and friend will transport home when medically cleared for discharge.     Lianet Romero, MSW, LSW [phone]

## 2022-05-18 NOTE — PROGRESS NOTES
Hospitalist Progress Note            Patient: Jonh Carreon Age: 55 y.o.   DOA: 5/12/2022 Admit Dx / CC: Retroperitoneal hematoma [K66.1]  Right leg pain [M79.604]  Warfarin-induced coagulopathy ([facility] [address] [D68.32, T45.515A]  Abdominal pain, unspecified abdominal location [R10.9]  Chest pain, unspecified type [R07.9]   LOS:  LOS: 6 days      Assessment/ Plan:     Spontaneous Retroperitoneal hemorrhage  Pt was admitted with Supratherapeutic INR >10  INR today 1.4   H/H stable  General surgery s/o  adjust pain meds    FRANTZ (resolved) on CKD stage III  Baseline Cr 1.5-1.7    Hx/o mechanical AVR/MVR in 2020 on coumadin  Pt follows with coumadin clinic  Pt is on 2.5 mg 5x/week and 5 mg 2x/wk  Cont with coumadin dose per pharmacy  Start therapeutic dose lovenox    HTN/HLD  Cont home meds    RLE edema  PVL RLE negative for DVT    DVT ppx: coumadin  Code status: Full code    Plan discharge 2-4 days pend INR    Plan of care discussed with: patient and bedside RN, CM    Patient Active Problem List   Diagnosis    Chest pain    Closed fracture of nasal bones    Injury of face    Retroperitoneal hematoma    Vitamin D deficiency        Medications:  Reviewed    Infusion Medications    sodium chloride       Scheduled Medications    enoxaparin  1 mg/kg SubCUTAneous BID    vitamin D  50,000 Units Oral Weekly    vitamin D  2,000 Units Oral Daily    acetaminophen  1,000 mg Oral 4x Daily    melatonin  5 mg Oral Nightly    gabapentin  300 mg Oral TID    methocarbamol  1,500 mg Oral 4x Daily    warfarin placeholder: dosing by pharmacy   Other RX Placeholder    ARIPiprazole  30 mg Oral Daily    metoprolol succinate  25 mg Oral Daily    rosuvastatin  40 mg Oral Daily    sodium chloride flush  10 mL IntraVENous 2 times per day    aspirin  81 mg Oral Daily     PRN Meds: oxyCODONE, tiZANidine, oxyCODONE, melatonin, HYDROcodone 5 mg - acetaminophen, sodium chloride flush, sodium chloride, promethazine **OR** [DISCONTINUED] ondansetron, polyethylene glycol    I/O    Intake/Output Summary (Last 24 hours) at 5/18/2022 1131  Last data filed at 5/18/2022 1000  Gross per 24 hour   Intake 480 ml   Output 675 ml   Net -195 ml       Labs:   Recent Labs     05/16/22  0438 05/17/22  0549 05/18/22  0542   WBC 5.7 6.2 6.0   HGB 9.6* 10.2* 10.8*   HCT 31.3* 32.7* 35.0*    151 181       Recent Labs     05/16/22  0438 05/17/22  0549 05/18/22  0542    139 137   K 4.3 4.3 4.0    105 105   CO2 22 21*  --    BUN 23* 21*  --    CREATININE 1.7* 1.5*  --    CALCIUM 8.6 8.7  --        Recent Labs     05/16/22 0438 05/17/22  0549   PROT 6.4 6.7   ALKPHOS 162* 175*   ALT 31 28   AST 44* 33   BILITOT 0.8 0.8       Recent Labs     05/16/22  0438 05/17/22  0549 05/18/22  0542   INR 1.6 1.7 1.4       No results for input(s): Melinda Clark in the last 72 hours. Chronic labs:  Lab Results   Component Value Date    CHOL 86 03/24/2022    TRIG 149 03/24/2022    HDL 28 03/24/2022    LDLCALC 28 03/24/2022    TSH 1.990 05/12/2022    INR 1.4 05/18/2022    LABA1C 5.2 03/24/2022       Radiology:  Imaging studies reviewed today. Subjective:     Tabby Pal says he has no pain at rest but has shooting pain down R thigh when he is up and walking    Objective:     Physical Exam:   BP (!) 130/94   Pulse 86   Temp 97.9 °F (36.6 °C) (Oral)   Resp 20   Ht 5' 9\" (1.753 m)   Wt 245 lb (111.1 kg)   SpO2 100%   BMI 36.18 kg/m²     General appearance:in no distress   Lungs: Clear to auscultation bilaterally, no wheezing or crackles   Heart: Regular rate and rhythm, S1, S2 normal   Abdomen: Soft, non-tender and not-distended.  Bowel sounds normal.   Extremities: no edema   Skin: Skin color, texture, turgor normal. No rashes or lesions   Neurologic: Grossly normal and non focal, CN II-XII intact       Jeremías Roe MD   Hospitalist Service   05/18/22 11:31 AM

## 2022-05-18 NOTE — PROGRESS NOTES
1-3 days/wk for 2 weeks PRN   Specific OT Treatment Interventions to include:   * Instruction/training on adapted ADL techniques and AE recommendations to increase functional independence within precautions       * Training on energy conservation strategies, correct breathing pattern and techniques to improve independence/tolerance for self-care routine  * Functional transfer/mobility training/DME recommendations for increased independence, safety, and fall prevention  * Patient/Family education to increase follow through with safety techniques and functional independence  * Recommendation of environmental modifications for increased safety with functional transfers/mobility and ADLs  * Therapeutic exercise to improve motor endurance, ROM, and functional strength for ADLs/functional transfers  * Therapeutic activities to facilitate/challenge dynamic balance, stand tolerance for increased safety and independence with ADLs  * Therapeutic activities to facilitate gross/fine motor skills for increased independence with ADLs     Recommended Adaptive Equipment: w/w (pt owns), TBD for additional AE     Home Living: Pt lives with friends in 2 floor home. 6-7 CECIL, 1 handrail  Bath and bedroom on 2nd floor - flight of stairs, 1 handrail   Bathroom setup: tub/shower with shower seat    Equipment owned: w/w     Prior Level of Function: independent/mod I with ADLs , shares with IADLs; ambulated w/o AD (pt verbalized stopped utilizing w/w d/t difficulty navigating around environmental barriers w/ AD)   Driving: yes     Pain Level: Pt c/o 6/10 R LE pain this session ; reinforced pain management strategies     Cognition: A&O: 4/4; Follows 1 step directions  Decreased insight to deficits (especially in regards to NWB L LE).             Memory:  good            Sequencing:  good            Problem solving:  fair            Judgement/safety:  fair              Functional Assessment:  AM-PAC Daily Activity Raw Score: 16/24    Initial Eval Status  Date: 5/16/22 Treatment Status  Date: 5/18/22 STGs = LTGs  Time frame: 10-14 days   Feeding Independent  Independent   -   Grooming Stand by Assist   Seated EOB Supervision seated EOB  Modified [place]    UB Dressing Stand by Assist  n/t Modified [place]    LB Dressing Maximal Assist  SBA to eileen/doff socks seated EOB   MIN A to eileen pants simulated, however pt not following NWB precautions  Stand by Assist    Bathing Moderate Assist N/t; pt educated with regards to DME Stand by Assist    Toileting Moderate Assist  n/t Stand by Assist    Bed Mobility  Supine to sit: Supervision   Sit to supine: Supervision  Supine>sit supervision  Supine to sit: Modified [place]   Sit to supine: Modified [place]    Functional Transfers Minimal Assist  Sit<>stand from EOB SBA with pt not following NWB precaution educated pt with regards to importance of following precautions pt stating he has pain in R LE making it difficult to follow precautions   Modified [place]    Functional Mobility Moderate Assist w/ w/w  Steps for/backward  Increased assist d/t NWB L LE n/t  Supervision    Balance Sitting:     Static:  Independent    Dynamic:SBA  Standing: Mod A w/ w/w Sitting:   Static: independent  Dynamic: supervision   Standing: MIN A       Activity Tolerance Fair-  Limited by difficulty maintaining weightbear status and fatigue Fair  Fair+    Visual/  Perceptual Glasses: yes                           Comments: Upon arrival pt supine in bed, agreeable to therapy session. Pt educated with regards to functional transfers, bed mobility, importance of following NWB precautions for L LE, DME, bathing AE. At end of session pt seated EOB,  all lines and tubes intact, call light within reach. · Pt has made fair  progress towards set goals.    · Continue with current plan of care      Treatment Time In:1530            Treatment Time Out: 6070                Treatment Charges: Mins Units   Ther Ex  65233 Manual Therapy [facility] [place] 15 1   ADL/Home t 99660 10 1   Neuro Re-ed 92361     Group Therapy      Orthotic manage/training  72132     Non-Billable Time     Total Timed Treatment 25 [address] [place] [id number]

## 2022-05-19 LAB
HCT VFR BLD CALC: 36.4 % (ref 37–54)
HEMOGLOBIN: 11.5 G/DL (ref 12.5–16.5)
INR BLD: 1.5
PROTHROMBIN TIME: 16.8 SEC (ref 9.3–12.4)

## 2022-05-19 PROCEDURE — 6360000002 HC RX W HCPCS: Performed by: INTERNAL MEDICINE

## 2022-05-19 PROCEDURE — 6370000000 HC RX 637 (ALT 250 FOR IP): Performed by: STUDENT IN AN ORGANIZED HEALTH CARE EDUCATION/TRAINING PROGRAM

## 2022-05-19 PROCEDURE — 2580000003 HC RX 258: Performed by: FAMILY MEDICINE

## 2022-05-19 PROCEDURE — 85610 PROTHROMBIN TIME: CPT

## 2022-05-19 PROCEDURE — 6370000000 HC RX 637 (ALT 250 FOR IP): Performed by: INTERNAL MEDICINE

## 2022-05-19 PROCEDURE — 6370000000 HC RX 637 (ALT 250 FOR IP): Performed by: SURGERY

## 2022-05-19 PROCEDURE — 85014 HEMATOCRIT: CPT

## 2022-05-19 PROCEDURE — 36415 COLL VENOUS BLD VENIPUNCTURE: CPT

## 2022-05-19 PROCEDURE — 85018 HEMOGLOBIN: CPT

## 2022-05-19 PROCEDURE — 1200000000 HC SEMI PRIVATE

## 2022-05-19 PROCEDURE — 6370000000 HC RX 637 (ALT 250 FOR IP): Performed by: NURSE PRACTITIONER

## 2022-05-19 PROCEDURE — 6370000000 HC RX 637 (ALT 250 FOR IP): Performed by: FAMILY MEDICINE

## 2022-05-19 RX ORDER — WARFARIN SODIUM 5 MG/1
5 TABLET ORAL
Status: COMPLETED | OUTPATIENT
Start: 2022-05-19 | End: 2022-05-19

## 2022-05-19 RX ORDER — OXYCODONE HYDROCHLORIDE 5 MG/1
5 TABLET ORAL EVERY 4 HOURS PRN
Status: DISCONTINUED | OUTPATIENT
Start: 2022-05-19 | End: 2022-05-20 | Stop reason: HOSPADM

## 2022-05-19 RX ADMIN — GABAPENTIN 300 MG: 300 CAPSULE ORAL at 13:13

## 2022-05-19 RX ADMIN — ACETAMINOPHEN 1000 MG: 500 TABLET ORAL at 09:16

## 2022-05-19 RX ADMIN — WARFARIN SODIUM 5 MG: 5 TABLET ORAL at 19:26

## 2022-05-19 RX ADMIN — OXYCODONE 7.5 MG: 5 TABLET ORAL at 18:07

## 2022-05-19 RX ADMIN — METOPROLOL SUCCINATE 25 MG: 25 TABLET, EXTENDED RELEASE ORAL at 09:14

## 2022-05-19 RX ADMIN — METHOCARBAMOL 1500 MG: 750 TABLET ORAL at 20:39

## 2022-05-19 RX ADMIN — GABAPENTIN 300 MG: 300 CAPSULE ORAL at 09:14

## 2022-05-19 RX ADMIN — ROSUVASTATIN 40 MG: 20 TABLET, FILM COATED ORAL at 09:13

## 2022-05-19 RX ADMIN — METHOCARBAMOL 1500 MG: 750 TABLET ORAL at 18:06

## 2022-05-19 RX ADMIN — METHOCARBAMOL 1500 MG: 750 TABLET ORAL at 09:13

## 2022-05-19 RX ADMIN — Medication 2000 UNITS: at 09:16

## 2022-05-19 RX ADMIN — ENOXAPARIN SODIUM 105 MG: 150 INJECTION SUBCUTANEOUS at 09:14

## 2022-05-19 RX ADMIN — SODIUM CHLORIDE, PRESERVATIVE FREE 10 ML: 5 INJECTION INTRAVENOUS at 09:14

## 2022-05-19 RX ADMIN — GABAPENTIN 300 MG: 300 CAPSULE ORAL at 20:39

## 2022-05-19 RX ADMIN — OXYCODONE 5 MG: 5 TABLET ORAL at 13:13

## 2022-05-19 RX ADMIN — HYDROCODONE BITARTRATE AND ACETAMINOPHEN 1 TABLET: 5; 325 TABLET ORAL at 09:17

## 2022-05-19 RX ADMIN — ASPIRIN 81 MG: 81 TABLET, COATED ORAL at 09:14

## 2022-05-19 RX ADMIN — ENOXAPARIN SODIUM 105 MG: 150 INJECTION SUBCUTANEOUS at 20:40

## 2022-05-19 RX ADMIN — Medication 5 MG: at 20:39

## 2022-05-19 RX ADMIN — METHOCARBAMOL 1500 MG: 750 TABLET ORAL at 13:10

## 2022-05-19 RX ADMIN — TIZANIDINE 4 MG: 4 TABLET ORAL at 20:39

## 2022-05-19 RX ADMIN — OXYCODONE 7.5 MG: 5 TABLET ORAL at 23:00

## 2022-05-19 RX ADMIN — SODIUM CHLORIDE, PRESERVATIVE FREE 10 ML: 5 INJECTION INTRAVENOUS at 20:45

## 2022-05-19 RX ADMIN — ARIPIPRAZOLE 30 MG: 15 TABLET ORAL at 09:13

## 2022-05-19 ASSESSMENT — PAIN DESCRIPTION - DESCRIPTORS: DESCRIPTORS: THROBBING

## 2022-05-19 ASSESSMENT — PAIN SCALES - GENERAL
PAINLEVEL_OUTOF10: 8

## 2022-05-19 ASSESSMENT — PAIN - FUNCTIONAL ASSESSMENT: PAIN_FUNCTIONAL_ASSESSMENT: ACTIVITIES ARE NOT PREVENTED

## 2022-05-19 ASSESSMENT — PAIN DESCRIPTION - LOCATION
LOCATION: KNEE;LEG
LOCATION: KNEE

## 2022-05-19 ASSESSMENT — PAIN DESCRIPTION - ORIENTATION: ORIENTATION: RIGHT

## 2022-05-19 NOTE — PROGRESS NOTES
Nurse to nurse report called and given to charge RN for 5WE. Patient's tele monitor removed and placed in appropriate storage bin per unit guidelines.     Minus Pee RN, BSN

## 2022-05-19 NOTE — PROGRESS NOTES
Pharmacy Consultation Note  (Warfarin Dosing and Monitoring)    Initial consult date: 5/14/2022  Consulting Provider: Tani Julian MD    Yolanda Power is a 55 y.o. male for whom pharmacy has been asked to manage warfarin therapy. Weight:   Wt Readings from Last 1 Encounters:   05/11/22 245 lb (111.1 kg)       TSH:    Lab Results   Component Value Date    TSH 1.990 05/12/2022       Hepatic Function Panel:                            Lab Results   Component Value Date    ALKPHOS 168 05/18/2022    ALT 28 05/18/2022    AST 32 05/18/2022    PROT 6.8 05/18/2022    BILITOT 0.8 05/18/2022    BILIDIR <0.2 04/05/2022    IBILI see below 04/05/2022    LABALBU 3.5 05/18/2022       Current warfarin drug-drug interactions include: vitamin k administration/TPN/tube feeds (decr INR)     Vitamin K 5 mg IV x 1 given 5/12  Kcentra 5000 units IV x1 given 5/12    Recent Labs     05/17/22  0549 05/18/22  0542 05/19/22  0505   HGB 10.2* 10.8* 11.5*    181  --      Date Warfarin Dose INR Heparin or LMWH Comment   5/11 -- >10     5/12 -- 6.8  Vitamin K, KCentra   5/13 -- 1.4     5/14 5 mg 1.3 None Pharmacy consulted   5/15 5 mg 1.3 None    5/16 2.5 mg 1.6 None    5/17 2.5 mg 1.7 None    5/18 5 mg 1.5 Enoxaparin    5/19 5 mg  Enoxaparin                    Assessment:  · Patient is a 55 y.o. male on warfarin for mechanical valves x2: AVR (#23 [employer] bileaflet mechanical) and MVR (#29 [employer] [place] bileaflet mechanical valve)  · Patient's home warfarin dosing regimen is 5 mg on Sat/Sun, and 2.5 mg all other days - decreased during admission in March 2022. · Goal INR 2.5 - 3.5   · Patient's INR > 10 on admission, admitted for retroperitoneal bleed, warfarin reversed w/ kcentra and vitamin K in the ED. · 5/14: INR 1.3  Today, Hgb 10.5/ Hct 32.8, anticipate transient warfarin resistance following vitamin K administration, patient will likely require a reduction in home regimen due to supratherapeutic INR on admission.  Pending US of RLE to rule out DVT. · 5/15: INR 1.3, no DVT on US  · 5/18: INR 1.4, day 7 after Vitamin K and KCentra. Vitamin K can lead to warfarin resistance for up to a week. Patient has received four days of home dose warfarin thus far. · 5/19: INR 1.5; warfarin 5 mg again tonight    Plan:  · Warfarin 5 mg x 1 tonight (higher than home dose). Would recommend close, short-interval follow up at discharge. · Daily PT/INR until the INR is stable within the therapeutic range  · Pharmacist will follow and monitor/adjust dosing as necessary    Thank you for this consult,    Jonn Pisano Pharm. D.  5/19/2022  7:41 AM

## 2022-05-19 NOTE — CARE COORDINATION
Care Coordination: spoke to attending today and plan to hold an additional day to monitor self administration of lovenox and recheck INR in am.    Tarsha Gorman

## 2022-05-19 NOTE — PROGRESS NOTES
Instructions given to patient on Lovenox injections. Patient verbalized understanding. Patient administered lovenox independently. Instructed/demonstrated on engaging safety mechanism.      Gloria Dukes RN, BSN

## 2022-05-19 NOTE — PROGRESS NOTES
Spoke with patient regarding self injection and education on lovenox injections. Patient stated his wife is unable to come in mornings for first injection, I was able to get clearance for patient's wife to come after hours. I instructed she would have to come through ED doors after hours.      Arpita Crane RN, BSN

## 2022-05-19 NOTE — PROGRESS NOTES
Comprehensive Nutrition Assessment    Type and Reason for Visit:  Initial,RD Nutrition Re-Screen/LOS    Nutrition Recommendations/Plan:   1. Continue current diet. No nutrition intervention indicated at this time. Will monitor. Nutrition Assessment:    Pt. admitted w/ retroperitoneal hematoma 2/2 INR. Noted FRANZT (resolved) hx of valve replacement ; on warfarin. Intakes stable consuming % of meals since admit. Will monitor. Nutrition Related Findings:    I&O (360ml) ; A&O x 4 ;  BL LE Trace edema ; active BS ; fair appetite, Wound Type: None       Current Nutrition Intake & Therapies:    Average Meal Intake: %  Average Supplements Intake: None Ordered  ADULT DIET; Regular; Low Fat/Low Chol/High Fiber/RIVERA    Anthropometric Measures:  Height: 5' 9\" (175.3 cm)  Ideal Body Weight (IBW): 160 lbs (73 kg)    Admission Body Weight: 245 lb (111.1 kg) (5/11 stated)  Current Body Weight: 245 lb (111.1 kg), 153.1 % IBW.  Weight Source: Stated  Current BMI (kg/m2): 36.2  Usual Body Weight: 235 lb (106.6 kg) (NIMISHA d/t no measured wt hx on file to assess)  % Weight Change (Calculated): 4.3                    BMI Categories: Obese Class 2 (BMI 35.0 -39.9)    Estimated Daily Nutrient Needs:  Energy Requirements Based On: Formula  Weight Used for Energy Requirements: Current  Energy (kcal/day): 1937  Weight Used for Protein Requirements: Ideal  Protein (g/day): 90-110g/day (1.3-1.5g/kg)  Method Used for Fluid Requirements: 1 ml/kcal  Fluid (ml/day): 0140-1675    Nutrition Diagnosis:   No nutrition diagnosis at this time     Nutrition Interventions:   Food and/or Nutrient Delivery: Continue Current Diet  Nutrition Education/Counseling: Education not indicated  Coordination of Nutrition Care: Continue to monitor while inpatient       Goals:     Goals: by next RD assessment,PO intake 75% or greater (To continue)       Nutrition Monitoring and Evaluation:   Behavioral-Environmental Outcomes: None Identified  Food/Nutrient Intake Outcomes: Food and Nutrient Intake  Physical Signs/Symptoms Outcomes: Biochemical Data,Constipation,GI Status,Meal Time Behavior,Nutrition Focused Physical Findings,Weight,Skin,Fluid Status or Edema    Discharge Planning:    No discharge needs at this time     Nic Ellison RD  Contact: ext [phone]

## 2022-05-19 NOTE — PROGRESS NOTES
Hospitalist Progress Note            Patient: Jhony Fagan Age: 55 y.o.   DOA: 5/12/2022 Admit Dx / CC: Retroperitoneal hematoma [K66.1]  Right leg pain [M79.604]  Warfarin-induced coagulopathy ([facility] [address] [D68.32, T45.515A]  Abdominal pain, unspecified abdominal location [R10.9]  Chest pain, unspecified type [R07.9]   LOS:  LOS: 7 days      Assessment/ Plan:     Spontaneous Retroperitoneal hemorrhage  Pt was admitted with Supratherapeutic INR >10  INR today 1.5  H/H stable  General surgery s/o  C/w pain meds    FRANTZ (resolved) on CKD stage III  Baseline Cr 1.5-1.7    Hx/o mechanical AVR/MVR in 2020 on coumadin  Pt follows with coumadin clinic  Pt is on 2.5 mg 5x/week and 5 mg 2x/wk  Cont with coumadin dose per pharmacy  C/w therapeutic dose lovenox until INR therapeutic  Nursing to teach patient and wife lovenox self injection as discusses    HTN/HLD  Cont home meds    RLE edema  PVL RLE negative for DVT    Advised pt to get a PCP ASAP    DVT ppx: coumadin  Code status: Full code    Plan discharge tomorrow    Plan of care discussed with: patient and bedside RN, MICHELE    Patient Active Problem List   Diagnosis    Chest pain    Closed fracture of nasal bones    Injury of face    Retroperitoneal hematoma    Vitamin D deficiency        Medications:  Reviewed    Infusion Medications    sodium chloride       Scheduled Medications    warfarin  5 mg Oral Once    enoxaparin  1 mg/kg SubCUTAneous BID    vitamin D  50,000 Units Oral Weekly    vitamin D  2,000 Units Oral Daily    acetaminophen  1,000 mg Oral 4x Daily    melatonin  5 mg Oral Nightly    gabapentin  300 mg Oral TID    methocarbamol  1,500 mg Oral 4x Daily    warfarin placeholder: dosing by pharmacy   Other RX Placeholder    ARIPiprazole  30 mg Oral Daily    metoprolol succinate  25 mg Oral Daily    rosuvastatin  40 mg Oral Daily    sodium chloride flush  10 mL IntraVENous 2 times per day    aspirin  81 mg Oral Daily     PRN Meds: oxyCODONE, tiZANidine, oxyCODONE, melatonin, HYDROcodone 5 mg - acetaminophen, sodium chloride flush, sodium chloride, promethazine **OR** [DISCONTINUED] ondansetron, polyethylene glycol    I/O  No intake or output data in the 24 hours ending 05/19/22 1026    Labs:   Recent Labs     05/17/22  0549 05/18/22  0542 05/19/22  0505   WBC 6.2 6.0  --    HGB 10.2* 10.8* 11.5*   HCT 32.7* 35.0* 36.4*    181  --        Recent Labs     05/17/22  0549 05/18/22  0542    137   K 4.3 4.0    105   CO2 21* 17*   BUN 21* 21*   CREATININE 1.5* 1.5*   CALCIUM 8.7 8.9       Recent Labs     05/17/22  0549 05/18/22  0542   PROT 6.7 6.8   ALKPHOS 175* 168*   ALT 28 28   AST 33 32   BILITOT 0.8 0.8       Recent Labs     05/17/22  0549 05/18/22  0542 05/19/22  0505   INR 1.7 1.4 1.5       No results for input(s): Arlene [place] in the last 72 hours. Chronic labs:  Lab Results   Component Value Date    CHOL 86 03/24/2022    TRIG 149 03/24/2022    HDL 28 03/24/2022    LDLCALC 28 03/24/2022    TSH 1.990 05/12/2022    INR 1.5 05/19/2022    LABA1C 5.2 03/24/2022       Radiology:  Imaging studies reviewed today. Subjective:     Buddie Tanya c/o pain in RLE when up but pain meds help    Objective:     Physical Exam:   BP (!) 145/91   Pulse 73   Temp 98.2 °F (36.8 °C) (Oral)   Resp 20   Ht 5' 9\" (1.753 m)   Wt 245 lb (111.1 kg)   SpO2 97%   BMI 36.18 kg/m²     General appearance:in no distress   Lungs: Clear to auscultation bilaterally, no wheezing or crackles   Heart: Regular rate and rhythm, S1, S2 normal   Abdomen: Soft, non-tender and not-distended.  Bowel sounds normal.   Extremities: no edema   Skin: Skin color, texture, turgor normal. No rashes or lesions   Neurologic: Grossly normal and non focal, CN II-XII intact       Roberta Gutierrez MD   Hospitalist Service   05/19/22 10:26 AM

## 2022-05-20 VITALS
HEART RATE: 78 BPM | WEIGHT: 245 LBS | RESPIRATION RATE: 18 BRPM | BODY MASS INDEX: 36.29 KG/M2 | SYSTOLIC BLOOD PRESSURE: 122 MMHG | DIASTOLIC BLOOD PRESSURE: 87 MMHG | OXYGEN SATURATION: 96 % | TEMPERATURE: 97.9 F | HEIGHT: 69 IN

## 2022-05-20 LAB
HCT VFR BLD CALC: 36 % (ref 37–54)
HEMOGLOBIN: 11.2 G/DL (ref 12.5–16.5)
INR BLD: 1.6
PROTHROMBIN TIME: 17.8 SEC (ref 9.3–12.4)

## 2022-05-20 PROCEDURE — 36415 COLL VENOUS BLD VENIPUNCTURE: CPT

## 2022-05-20 PROCEDURE — 6370000000 HC RX 637 (ALT 250 FOR IP): Performed by: NURSE PRACTITIONER

## 2022-05-20 PROCEDURE — 2580000003 HC RX 258: Performed by: FAMILY MEDICINE

## 2022-05-20 PROCEDURE — 85610 PROTHROMBIN TIME: CPT

## 2022-05-20 PROCEDURE — 6370000000 HC RX 637 (ALT 250 FOR IP): Performed by: SURGERY

## 2022-05-20 PROCEDURE — 85014 HEMATOCRIT: CPT

## 2022-05-20 PROCEDURE — 6370000000 HC RX 637 (ALT 250 FOR IP): Performed by: INTERNAL MEDICINE

## 2022-05-20 PROCEDURE — 6370000000 HC RX 637 (ALT 250 FOR IP): Performed by: STUDENT IN AN ORGANIZED HEALTH CARE EDUCATION/TRAINING PROGRAM

## 2022-05-20 PROCEDURE — 85018 HEMOGLOBIN: CPT

## 2022-05-20 PROCEDURE — 6360000002 HC RX W HCPCS: Performed by: INTERNAL MEDICINE

## 2022-05-20 PROCEDURE — 6370000000 HC RX 637 (ALT 250 FOR IP): Performed by: FAMILY MEDICINE

## 2022-05-20 RX ORDER — TIZANIDINE 4 MG/1
4 TABLET ORAL EVERY 8 HOURS PRN
Qty: 15 TABLET | Refills: 0 | Status: SHIPPED | OUTPATIENT
Start: 2022-05-20 | End: 2022-08-22

## 2022-05-20 RX ORDER — OXYCODONE HYDROCHLORIDE 5 MG/1
5 TABLET ORAL EVERY 4 HOURS PRN
Qty: 15 TABLET | Refills: 0 | Status: SHIPPED | OUTPATIENT
Start: 2022-05-20 | End: 2022-05-28 | Stop reason: HOSPADM

## 2022-05-20 RX ADMIN — Medication 2000 UNITS: at 08:30

## 2022-05-20 RX ADMIN — SODIUM CHLORIDE, PRESERVATIVE FREE 10 ML: 5 INJECTION INTRAVENOUS at 08:29

## 2022-05-20 RX ADMIN — ROSUVASTATIN 40 MG: 20 TABLET, FILM COATED ORAL at 08:28

## 2022-05-20 RX ADMIN — ARIPIPRAZOLE 30 MG: 15 TABLET ORAL at 08:27

## 2022-05-20 RX ADMIN — ASPIRIN 81 MG: 81 TABLET, COATED ORAL at 08:28

## 2022-05-20 RX ADMIN — ENOXAPARIN SODIUM 105 MG: 150 INJECTION SUBCUTANEOUS at 08:28

## 2022-05-20 RX ADMIN — METHOCARBAMOL 1500 MG: 750 TABLET ORAL at 08:28

## 2022-05-20 RX ADMIN — ACETAMINOPHEN 1000 MG: 500 TABLET ORAL at 08:26

## 2022-05-20 RX ADMIN — METOPROLOL SUCCINATE 25 MG: 25 TABLET, EXTENDED RELEASE ORAL at 08:28

## 2022-05-20 RX ADMIN — TIZANIDINE 4 MG: 4 TABLET ORAL at 05:17

## 2022-05-20 RX ADMIN — GABAPENTIN 300 MG: 300 CAPSULE ORAL at 08:28

## 2022-05-20 RX ADMIN — OXYCODONE 5 MG: 5 TABLET ORAL at 05:17

## 2022-05-20 ASSESSMENT — PAIN DESCRIPTION - PAIN TYPE: TYPE: ACUTE PAIN

## 2022-05-20 ASSESSMENT — PAIN SCALES - GENERAL
PAINLEVEL_OUTOF10: 8
PAINLEVEL_OUTOF10: 9

## 2022-05-20 ASSESSMENT — PAIN DESCRIPTION - LOCATION: LOCATION: LEG

## 2022-05-20 ASSESSMENT — PAIN DESCRIPTION - ONSET: ONSET: ON-GOING

## 2022-05-20 ASSESSMENT — PAIN DESCRIPTION - FREQUENCY: FREQUENCY: CONTINUOUS

## 2022-05-20 NOTE — CARE COORDINATION
Discharge plan is to return home when medically stable.  Electronically signed by Cheko Saenz RN on 5/20/2022 at 10:47 AM

## 2022-05-20 NOTE — PROGRESS NOTES
Pharmacy Consultation Note  (Warfarin Dosing and Monitoring)    Initial consult date: 5/14/2022  Consulting Provider: Gabriel Sorto MD    Alana Youssef is a 55 y.o. male for whom pharmacy has been asked to manage warfarin therapy. Weight:   Wt Readings from Last 1 Encounters:   05/11/22 245 lb (111.1 kg)       TSH:    Lab Results   Component Value Date    TSH 1.990 05/12/2022       Hepatic Function Panel:                            Lab Results   Component Value Date    ALKPHOS 168 05/18/2022    ALT 28 05/18/2022    AST 32 05/18/2022    PROT 6.8 05/18/2022    BILITOT 0.8 05/18/2022    BILIDIR <0.2 04/05/2022    IBILI see below 04/05/2022    LABALBU 3.5 05/18/2022       Current warfarin drug-drug interactions include: vitamin k administration/TPN/tube feeds (decr INR)     Vitamin K 5 mg IV x 1 given 5/12  Kcentra 5000 units IV x1 given 5/12    Recent Labs     05/18/22  0542 05/19/22  0505 05/20/22  0524   HGB 10.8* 11.5* 11.2*     --   --      Date Warfarin Dose INR Heparin or LMWH Comment   5/11 -- >10     5/12 -- 6.8  Vitamin K, KCentra   5/13 -- 1.4     5/14 5 mg 1.3 None Pharmacy consulted   5/15 5 mg 1.3 None    5/16 2.5 mg 1.6 None    5/17 2.5 mg 1.7 None    5/18 5 mg 1.5 Enoxaparin    5/19 5 mg  Enoxaparin    5/20  Patient to take medication at home                Assessment:  · Patient is a 55 y.o. male on warfarin for mechanical valves x2: AVR (#23 [employer] bileaflet mechanical) and MVR (#29 [employer] bileaflet mechanical valve)  · Patient's home warfarin dosing regimen is 5 mg on Sat/Sun, and 2.5 mg all other days - decreased during admission in March 2022. · Goal INR 2.5 - 3.5   · Patient's INR > 10 on admission, admitted for retroperitoneal bleed, warfarin reversed w/ kcentra and vitamin K in the ED.    · 5/14: INR 1.3  Today, Hgb 10.5/ Hct 32.8, anticipate transient warfarin resistance following vitamin K administration, patient will likely require a reduction in home regimen due to supratherapeutic INR on admission. Pending US of RLE to rule out DVT. · 5/15: INR 1.3, no DVT on US  · 5/18: INR 1.4, day 7 after Vitamin K and KCentra. Vitamin K can lead to warfarin resistance for up to a week. Patient has received four days of home dose warfarin thus far. · 5/19: INR 1.5; warfarin 5 mg again tonight  · 5/20: INR 1.6    Plan:  · Patient discharged today. Patient to take warfarin 2.5 mg daily with therapeutic lovenox at home today. Follow up with INR in 2 days per primary.   · Daily PT/INR until the INR is stable within the therapeutic range  · Pharmacist will follow and monitor/adjust dosing as necessary    Thank you for this consult,    Joellen Giang, PharmD, BCPS 5/20/2022 12:39 PM

## 2022-05-20 NOTE — PROGRESS NOTES
Patient to be discharged home today. Significant other came last night to learn how to give lovenox injections. Patient and Girlfriend feel comfortable with giving shots. Medications and discharge instructions reviewed with patient. NO questions or concerns. Number given on paperwork for PCP clinic. Patient educated on importance of follow up Monday and lab work . IV removed per policy. Patient to be transported home by family early afternoon.

## 2022-05-20 NOTE — DISCHARGE SUMMARY
Patient: Kita Abraham Sex: male DOA: 5/12/2022   YOB: 1965 Age: 55 y.o. LOS:  LOS: 8 days    Admit Date: 5/12/2022   Discharge Date: 05/20/22   Primary Care Physician: No primary care provider on file. Discharge to: home with support  Readmission risk: Moderate Risk    Hospital admission:  Per HPI:\" Mr. Kita Abraham, a 55y.o. year old male  who  has no past medical history on file.      Presented to the emergency department with leg pain, chest pain, shortness of breath. Has not been able to walk or bend his leg for 3 days. Denies any trauma. Chest pain started today is left-sided and radiates to his arm. He is having some nausea. Nonproductive cough. Patient has a history of valve replacement and is on warfarin. Vital signs reveal the patient be tachycardic with a rate of 111. Remainder of his vital signs within normal meds and stable. He is afebrile. Laboratory studies demonstrate potassium 3.2, creatinine 1.5, glucose 128, troponin of 42 with repeat of 63, alk phos 214, , ALT 64. INR greater than 10. Chest x-ray is unremarkable. X-ray of the hip was unremarkable. CT abdomen pelvis shows findings suggestive of retroperitoneal hematoma involving the right iliac us muscle as well as a hematoma extending along the right pelvic sidewall lateral to the bladder. In addition mesenteric fat stranding it is also noted extending along the right pelvic mesentery. Patient was given vitamin K. General surgery was consulted.  [facility] Emergency Department Course and discharge assessment with plan:     Spontaneous Retroperitoneal hemorrhage  Pt was admitted with Supratherapeutic INR >10  INR today 1.6  H/H stablized  Rpt CBC 5/23 as OP     FRANTZ (resolved) on CKD stage III  Baseline Cr 1.5-1.7     Hx/o mechanical AVR/MVR in 2020 on coumadin  Pt follows with coumadin clinic  Pt is on 2.5 mg 5x/week and 5 mg 2x/wk, will discharge on 2.5 mg daily for now  Discharge lovenox bridge until INR in therapeutic rage with close f/u in INR clinic for INR check and dose adjustment  Check INR 5/23, 5/25, 5/27 at Coumadin clinic and then as needed per trend  Pt and his wife educated on lovenox injection     HTN/HLD  Cont home meds     RLE edema  PVL RLE negative for DVT    Vitamin D deficiency  Start replacement    To get a PCP ASAP as advised/Staff will schedule appt prior to discharge    Discharge plan of care was discussed with: pt, RN, CM    Discharge Diagnoses:   Patient Active Problem List   Diagnosis    Chest pain    Closed fracture of nasal bones    Injury of face    Retroperitoneal hematoma    Vitamin D deficiency       Discharge Medications:   Current Discharge Medication List           Details   oxyCODONE (ROXICODONE) 5 MG immediate release tablet Take 1 tablet by mouth every 4 hours as needed for Pain for up to 7 days. Qty: 15 tablet, Refills: 0    Comments: Reduce doses taken as pain becomes manageable  Associated Diagnoses: Retroperitoneal hematoma      tiZANidine (ZANAFLEX) 4 MG tablet Take 1 tablet by mouth every 8 hours as needed (muscle spasms)  Qty: 15 tablet, Refills: 0      enoxaparin (LOVENOX) 120 MG/0.8ML injection Inject 0.73 mLs into the skin 2 times daily for 7 days  Qty: 14 each, Refills: 0      gabapentin (NEURONTIN) 300 MG capsule Take 1 capsule by mouth 3 times daily for 30 days.   Qty: 90 capsule, Refills: 0      vitamin D (CHOLECALCIFEROL) 50 MCG (2000 UT) TABS tablet Take 1 tablet by mouth daily  Qty: 30 tablet, Refills: 0      Ergocalciferol (VITAMIN D) 94867 units CAPS Take 50,000 Units by mouth once a week  Qty: 12 capsule, Refills: 0              Details   warfarin (COUMADIN) 2.5 MG tablet Take 1 tablet by mouth daily  Qty: 30 tablet, Refills: 0              Details   aspirin 81 MG EC tablet Take 1 tablet by mouth daily  Qty: 30 tablet, Refills: 3      metoprolol succinate (TOPROL XL) 25 MG extended release tablet Take 25 mg by mouth daily       ARIPiprazole (ABILIFY) 30 MG tablet Take 30 mg by mouth daily      furosemide (LASIX) 20 MG tablet Take 20 mg by mouth daily      rosuvastatin (CRESTOR) 40 MG tablet Take 40 mg by mouth daily              Follow-up: PCP in 5 days, INR clinic 2 days     Discharge Condition: stable    Discharge instruction:   Patient instructed to return to the emergency department if: Chest pain, shortness of breath, altered mental status, fever, chills, nausea, vomiting, abdominal pain or any other concerns. Activity: progressive as tolerated. Diet: cardiac diet     Wound Care: none needed     Consults: cardiology and general surgery       Discharge Physical Exam:   /87   Pulse 78   Temp 97.9 °F (36.6 °C) (Temporal)   Resp 18   Ht 5' 9\" (1.753 m)   Wt 245 lb (111.1 kg)   SpO2 96%   BMI 36.18 kg/m²     General appearance: Alert, cooperative, no distress, appears stated age   Head: Normocephalic, without obvious abnormality, atraumatic   Neck: Supple, no lymphadenopathy or thyromegaly, trachea midline   Lungs: Clear to auscultation bilaterally, no wheezing or crackles   Heart: Regular rate and rhythm, S1, S2 normal   Abdomen: Soft, non-tender and not-distended.  Bowel sounds normal.   Extremities: Extremities normal, atraumatic, no cyanosis or edema   Skin: Skin color, texture, turgor normal. No rashes or lesions   Neurologic: AAOx3 CN I-XII intact, normal muscle tone and power, normal sensation       Jorden Elam MD   05/20/22 12:11 PM    Hospital Medicine   Time Spent: 55 min

## 2022-05-27 PROCEDURE — 99285 EMERGENCY DEPT VISIT HI MDM: CPT

## 2022-05-27 PROCEDURE — 93005 ELECTROCARDIOGRAM TRACING: CPT | Performed by: EMERGENCY MEDICINE

## 2022-05-27 ASSESSMENT — PAIN DESCRIPTION - LOCATION
LOCATION_2: LEG
LOCATION: CHEST;SHOULDER

## 2022-05-27 ASSESSMENT — LIFESTYLE VARIABLES
HOW MANY STANDARD DRINKS CONTAINING ALCOHOL DO YOU HAVE ON A TYPICAL DAY: 1 OR 2
HOW OFTEN DO YOU HAVE A DRINK CONTAINING ALCOHOL: MONTHLY OR LESS

## 2022-05-27 ASSESSMENT — PAIN DESCRIPTION - INTENSITY: RATING_2: 3

## 2022-05-27 ASSESSMENT — PAIN DESCRIPTION - DESCRIPTORS
DESCRIPTORS_2: SHARP
DESCRIPTORS: CRAMPING

## 2022-05-27 ASSESSMENT — PAIN - FUNCTIONAL ASSESSMENT: PAIN_FUNCTIONAL_ASSESSMENT: 0-10

## 2022-05-27 ASSESSMENT — PAIN DESCRIPTION - ORIENTATION
ORIENTATION_2: RIGHT;UPPER
ORIENTATION: LEFT

## 2022-05-27 ASSESSMENT — PAIN DESCRIPTION - PAIN TYPE: TYPE: ACUTE PAIN

## 2022-05-27 ASSESSMENT — PAIN DESCRIPTION - FREQUENCY: FREQUENCY: INTERMITTENT

## 2022-05-28 ENCOUNTER — HOSPITAL ENCOUNTER (OUTPATIENT)
Age: 57
Setting detail: OBSERVATION
Discharge: HOME | End: 2022-05-28
Attending: EMERGENCY MEDICINE | Admitting: INTERNAL MEDICINE
Payer: COMMERCIAL

## 2022-05-28 ENCOUNTER — APPOINTMENT (OUTPATIENT)
Dept: NUCLEAR MEDICINE | Age: 57
End: 2022-05-28
Payer: COMMERCIAL

## 2022-05-28 ENCOUNTER — APPOINTMENT (OUTPATIENT)
Dept: GENERAL RADIOLOGY | Age: 57
End: 2022-05-28
Payer: COMMERCIAL

## 2022-05-28 VITALS
RESPIRATION RATE: 18 BRPM | SYSTOLIC BLOOD PRESSURE: 142 MMHG | OXYGEN SATURATION: 98 % | WEIGHT: 245 LBS | TEMPERATURE: 98.7 F | HEART RATE: 100 BPM | HEIGHT: 69 IN | BODY MASS INDEX: 36.29 KG/M2 | DIASTOLIC BLOOD PRESSURE: 93 MMHG

## 2022-05-28 DIAGNOSIS — R07.9 CHEST PAIN, UNSPECIFIED TYPE: Primary | ICD-10-CM

## 2022-05-28 LAB
ALBUMIN SERPL-MCNC: 3.8 G/DL (ref 3.5–5.2)
ALP BLD-CCNC: 125 U/L (ref 40–129)
ALT SERPL-CCNC: 20 U/L (ref 0–40)
ANION GAP SERPL CALCULATED.3IONS-SCNC: 17 MMOL/L (ref 7–16)
APTT: 42.9 SEC (ref 24.5–35.1)
AST SERPL-CCNC: 24 U/L (ref 0–39)
BASOPHILS ABSOLUTE: 0.12 E9/L (ref 0–0.2)
BASOPHILS RELATIVE PERCENT: 1.6 % (ref 0–2)
BILIRUB SERPL-MCNC: 0.4 MG/DL (ref 0–1.2)
BUN BLDV-MCNC: 20 MG/DL (ref 6–20)
CALCIUM SERPL-MCNC: 8.9 MG/DL (ref 8.6–10.2)
CHLORIDE BLD-SCNC: 107 MMOL/L (ref 98–107)
CO2: 16 MMOL/L (ref 22–29)
CREAT SERPL-MCNC: 1.8 MG/DL (ref 0.7–1.2)
EOSINOPHILS ABSOLUTE: 0.55 E9/L (ref 0.05–0.5)
EOSINOPHILS RELATIVE PERCENT: 7.1 % (ref 0–6)
GFR AFRICAN AMERICAN: 47
GFR NON-AFRICAN AMERICAN: 39 ML/MIN/1.73
GLUCOSE BLD-MCNC: 108 MG/DL (ref 74–99)
HCT VFR BLD CALC: 39.1 % (ref 37–54)
HEMOGLOBIN: 12.4 G/DL (ref 12.5–16.5)
IMMATURE GRANULOCYTES #: 0.03 E9/L
IMMATURE GRANULOCYTES %: 0.4 % (ref 0–5)
INR BLD: 2.7
LACTIC ACID: 1.8 MMOL/L (ref 0.5–2.2)
LV EF: 54 %
LVEF MODALITY: NORMAL
LYMPHOCYTES ABSOLUTE: 2.12 E9/L (ref 1.5–4)
LYMPHOCYTES RELATIVE PERCENT: 27.5 % (ref 20–42)
MAGNESIUM: 2.1 MG/DL (ref 1.6–2.6)
MCH RBC QN AUTO: 31.6 PG (ref 26–35)
MCHC RBC AUTO-ENTMCNC: 31.7 % (ref 32–34.5)
MCV RBC AUTO: 99.5 FL (ref 80–99.9)
MONOCYTES ABSOLUTE: 0.87 E9/L (ref 0.1–0.95)
MONOCYTES RELATIVE PERCENT: 11.3 % (ref 2–12)
NEUTROPHILS ABSOLUTE: 4.01 E9/L (ref 1.8–7.3)
NEUTROPHILS RELATIVE PERCENT: 52.1 % (ref 43–80)
PDW BLD-RTO: 15.8 FL (ref 11.5–15)
PLATELET # BLD: 283 E9/L (ref 130–450)
PMV BLD AUTO: 11.3 FL (ref 7–12)
POTASSIUM SERPL-SCNC: 3.3 MMOL/L (ref 3.5–5)
PRO-BNP: 435 PG/ML (ref 0–125)
PROTHROMBIN TIME: 29.9 SEC (ref 9.3–12.4)
RBC # BLD: 3.93 E12/L (ref 3.8–5.8)
REASON FOR REJECTION: NORMAL
REJECTED TEST: NORMAL
SODIUM BLD-SCNC: 140 MMOL/L (ref 132–146)
TOTAL PROTEIN: 7.1 G/DL (ref 6.4–8.3)
TROPONIN, HIGH SENSITIVITY: 62 NG/L (ref 0–11)
TROPONIN, HIGH SENSITIVITY: 68 NG/L (ref 0–11)
WBC # BLD: 7.7 E9/L (ref 4.5–11.5)

## 2022-05-28 PROCEDURE — 6360000002 HC RX W HCPCS: Performed by: NURSE PRACTITIONER

## 2022-05-28 PROCEDURE — 78452 HT MUSCLE IMAGE SPECT MULT: CPT | Performed by: INTERNAL MEDICINE

## 2022-05-28 PROCEDURE — 85730 THROMBOPLASTIN TIME PARTIAL: CPT

## 2022-05-28 PROCEDURE — 6370000000 HC RX 637 (ALT 250 FOR IP): Performed by: EMERGENCY MEDICINE

## 2022-05-28 PROCEDURE — 83735 ASSAY OF MAGNESIUM: CPT

## 2022-05-28 PROCEDURE — 83605 ASSAY OF LACTIC ACID: CPT

## 2022-05-28 PROCEDURE — 80053 COMPREHEN METABOLIC PANEL: CPT

## 2022-05-28 PROCEDURE — 36415 COLL VENOUS BLD VENIPUNCTURE: CPT

## 2022-05-28 PROCEDURE — 93018 CV STRESS TEST I&R ONLY: CPT | Performed by: INTERNAL MEDICINE

## 2022-05-28 PROCEDURE — 83880 ASSAY OF NATRIURETIC PEPTIDE: CPT

## 2022-05-28 PROCEDURE — A9500 TC99M SESTAMIBI: HCPCS | Performed by: RADIOLOGY

## 2022-05-28 PROCEDURE — 93016 CV STRESS TEST SUPVJ ONLY: CPT | Performed by: INTERNAL MEDICINE

## 2022-05-28 PROCEDURE — 6370000000 HC RX 637 (ALT 250 FOR IP): Performed by: NURSE PRACTITIONER

## 2022-05-28 PROCEDURE — G0378 HOSPITAL OBSERVATION PER HR: HCPCS

## 2022-05-28 PROCEDURE — 85025 COMPLETE CBC W/AUTO DIFF WBC: CPT

## 2022-05-28 PROCEDURE — 78452 HT MUSCLE IMAGE SPECT MULT: CPT

## 2022-05-28 PROCEDURE — 71045 X-RAY EXAM CHEST 1 VIEW: CPT

## 2022-05-28 PROCEDURE — 3430000000 HC RX DIAGNOSTIC RADIOPHARMACEUTICAL: Performed by: RADIOLOGY

## 2022-05-28 PROCEDURE — 85610 PROTHROMBIN TIME: CPT

## 2022-05-28 PROCEDURE — 93017 CV STRESS TEST TRACING ONLY: CPT

## 2022-05-28 PROCEDURE — 84484 ASSAY OF TROPONIN QUANT: CPT

## 2022-05-28 RX ORDER — METOPROLOL SUCCINATE 25 MG/1
50 TABLET, EXTENDED RELEASE ORAL DAILY
Status: DISCONTINUED | OUTPATIENT
Start: 2022-05-28 | End: 2022-05-28 | Stop reason: HOSPADM

## 2022-05-28 RX ORDER — FUROSEMIDE 20 MG/1
20 TABLET ORAL DAILY
Qty: 60 TABLET | Refills: 0
Start: 2022-05-29 | End: 2022-08-22

## 2022-05-28 RX ORDER — CHOLECALCIFEROL (VITAMIN D3) 50 MCG
2000 TABLET ORAL DAILY
Status: CANCELLED | OUTPATIENT
Start: 2022-05-28

## 2022-05-28 RX ORDER — SODIUM CHLORIDE 0.9 % (FLUSH) 0.9 %
5-40 SYRINGE (ML) INJECTION PRN
Status: CANCELLED | OUTPATIENT
Start: 2022-05-28

## 2022-05-28 RX ORDER — METOPROLOL SUCCINATE 50 MG/1
50 TABLET, EXTENDED RELEASE ORAL DAILY
Qty: 30 TABLET | Refills: 0 | Status: SHIPPED | OUTPATIENT
Start: 2022-05-28 | End: 2022-08-22

## 2022-05-28 RX ORDER — POTASSIUM CHLORIDE 20 MEQ/1
40 TABLET, EXTENDED RELEASE ORAL PRN
Status: CANCELLED | OUTPATIENT
Start: 2022-05-28

## 2022-05-28 RX ORDER — POLYETHYLENE GLYCOL 3350 17 G/17G
17 POWDER, FOR SOLUTION ORAL DAILY PRN
Status: CANCELLED | OUTPATIENT
Start: 2022-05-28

## 2022-05-28 RX ORDER — ARIPIPRAZOLE 15 MG/1
30 TABLET ORAL DAILY
Status: CANCELLED | OUTPATIENT
Start: 2022-05-28

## 2022-05-28 RX ORDER — TIZANIDINE 4 MG/1
4 TABLET ORAL EVERY 8 HOURS PRN
Status: CANCELLED | OUTPATIENT
Start: 2022-05-28

## 2022-05-28 RX ORDER — ACETAMINOPHEN 650 MG/1
650 SUPPOSITORY RECTAL EVERY 6 HOURS PRN
Status: CANCELLED | OUTPATIENT
Start: 2022-05-28

## 2022-05-28 RX ORDER — ASPIRIN 81 MG/1
324 TABLET, CHEWABLE ORAL ONCE
Status: COMPLETED | OUTPATIENT
Start: 2022-05-28 | End: 2022-05-28

## 2022-05-28 RX ORDER — ENOXAPARIN SODIUM 150 MG/ML
1 INJECTION SUBCUTANEOUS 2 TIMES DAILY
Status: CANCELLED | OUTPATIENT
Start: 2022-05-28

## 2022-05-28 RX ORDER — SODIUM CHLORIDE 9 MG/ML
INJECTION, SOLUTION INTRAVENOUS PRN
Status: CANCELLED | OUTPATIENT
Start: 2022-05-28

## 2022-05-28 RX ORDER — ROSUVASTATIN CALCIUM 20 MG/1
40 TABLET, COATED ORAL NIGHTLY
Status: DISCONTINUED | OUTPATIENT
Start: 2022-05-28 | End: 2022-05-28 | Stop reason: HOSPADM

## 2022-05-28 RX ORDER — SODIUM CHLORIDE 0.9 % (FLUSH) 0.9 %
5-40 SYRINGE (ML) INJECTION EVERY 12 HOURS SCHEDULED
Status: CANCELLED | OUTPATIENT
Start: 2022-05-28

## 2022-05-28 RX ORDER — POTASSIUM CHLORIDE 7.45 MG/ML
10 INJECTION INTRAVENOUS PRN
Status: CANCELLED | OUTPATIENT
Start: 2022-05-28

## 2022-05-28 RX ORDER — METOPROLOL SUCCINATE 25 MG/1
25 TABLET, EXTENDED RELEASE ORAL DAILY
Status: CANCELLED | OUTPATIENT
Start: 2022-05-28

## 2022-05-28 RX ORDER — ONDANSETRON 2 MG/ML
4 INJECTION INTRAMUSCULAR; INTRAVENOUS EVERY 6 HOURS PRN
Status: CANCELLED | OUTPATIENT
Start: 2022-05-28

## 2022-05-28 RX ORDER — ASPIRIN 81 MG/1
81 TABLET ORAL DAILY
Status: CANCELLED | OUTPATIENT
Start: 2022-05-28

## 2022-05-28 RX ORDER — GABAPENTIN 300 MG/1
300 CAPSULE ORAL 3 TIMES DAILY
Status: CANCELLED | OUTPATIENT
Start: 2022-05-28

## 2022-05-28 RX ORDER — WARFARIN SODIUM 2.5 MG/1
2.5 TABLET ORAL DAILY
Status: CANCELLED | OUTPATIENT
Start: 2022-05-28

## 2022-05-28 RX ORDER — ROSUVASTATIN CALCIUM 20 MG/1
40 TABLET, COATED ORAL DAILY
Status: CANCELLED | OUTPATIENT
Start: 2022-05-28

## 2022-05-28 RX ORDER — ONDANSETRON 4 MG/1
4 TABLET, ORALLY DISINTEGRATING ORAL EVERY 8 HOURS PRN
Status: CANCELLED | OUTPATIENT
Start: 2022-05-28

## 2022-05-28 RX ORDER — ACETAMINOPHEN 325 MG/1
650 TABLET ORAL EVERY 6 HOURS PRN
Status: CANCELLED | OUTPATIENT
Start: 2022-05-28

## 2022-05-28 RX ORDER — FUROSEMIDE 20 MG/1
20 TABLET ORAL DAILY
Status: CANCELLED | OUTPATIENT
Start: 2022-05-28

## 2022-05-28 RX ADMIN — Medication 12 MILLICURIE: at 09:47

## 2022-05-28 RX ADMIN — Medication 35 MILLICURIE: at 12:14

## 2022-05-28 RX ADMIN — ASPIRIN 324 MG: 81 TABLET, CHEWABLE ORAL at 05:47

## 2022-05-28 RX ADMIN — METOPROLOL SUCCINATE 50 MG: 25 TABLET, EXTENDED RELEASE ORAL at 13:13

## 2022-05-28 RX ADMIN — REGADENOSON 0.4 MG: 0.08 INJECTION, SOLUTION INTRAVENOUS at 10:46

## 2022-05-28 RX ADMIN — POTASSIUM BICARBONATE 20 MEQ: 782 TABLET, EFFERVESCENT ORAL at 03:58

## 2022-05-28 ASSESSMENT — ENCOUNTER SYMPTOMS
COLOR CHANGE: 0
SHORTNESS OF BREATH: 1
ABDOMINAL PAIN: 0
DIARRHEA: 0
NAUSEA: 1
COUGH: 0
RHINORRHEA: 0
BLOOD IN STOOL: 0
TROUBLE SWALLOWING: 0
VOMITING: 0

## 2022-05-28 NOTE — DISCHARGE SUMMARY
Hospitalist Discharge Summary      Same-day admit discharge. Lexiscan stress test was without evidence of ischemia and deemed low risk. Cardiology was consulted who cleared patient for discharge. Counseled on tobacco and alcohol cessation. Counseled on weight loss.   See same-day H&P for further course detail.    +++++++++++++++++++++++++++++++++++++++++++++++++  AbhayKeyona De Paz [address] [place] [address]

## 2022-05-28 NOTE — PROCEDURES
Lexiscan Stress EKG Report:    Dx CP    Baseline EKG: normal sinus rhythm, nonspecific ST and T waves changes. Stress EKG: No ST-T changes. Arrhythmias: None. Symptoms: None. Summary:  Unremarkable lexiscan stress EKG. See separate report for stress perfusion results. Venu Huitron D.O.   Cardiologist  Cardiology, [facility]

## 2022-05-28 NOTE — CONSULTS
Inpatient Cardiology Consultation      Reason for Consult: Chest pain    Consulting Physician: Dr. Misty Denton    Requesting Physician:  Dr. Chepe Porter    Date of Consultation: 5/28/2022    HISTORY OF PRESENT ILLNESS:   Mr. Ramez Moses is a 55year old male who is known to Dr. Misty Denton. PMHx: Current smoker (35 pack years), HTN, HLD, Hx of Testicular CA (s/p chemo and radiation), Hx of CVA (2015), Hx of CAD / VHD s/p CABG (LIMA-LAD) and AVR (#23 [employer] bileaflet mechanical)  and MVR (  #29 [employer] bileaflet mechanical valve) on chronic coumadin therapy (Goal INR 2.5-3.5), s/p Dual Chamber PPM (inserted at [facility] 9/2020), RBBB/LAFB. Recent Hospital Encounter:   [facility] on 5/11/2022 with complaints of right leg pain / right hip pain / left sided chest pain that reportedly was traveling to his left shoulder, associated with shortness of breath. Was noted to have retroperitoneal hemorrhage in the setting of a supratherapeutic INR (>10). She was also noted to be hypokalemic. She was started on LR at 75 cc/h continuous. Normal cardiology was consulted for spontaneous retroperitoneal hematoma in the setting of a supratherapeutic INR (not actively bleeding at that time). Plans to reverse INR with IV vitamin K and order Kcentra. +Hemoccult stool. Cardiology recommendations to resume Coumadin when INR improves (Goal INR 2.5-3.5). ---> Cardiology signed off. Patient was given 4U FFP. Coumadin was resumed on 5/14/2022. INR 1.5 (5/18/2022). Patient was discharged on 5/20/2022 with Lovenox bridging to coumadin. Patient was discharged to home with nurse. Patient reported that he has been experiencing left chest wall \"sharp pains\" radiating into his left arm and down his left arm with numbness and tingling since 5/2021 after he had a left shoulder surgery. He reports having intermittent \"sharp pains\" lasting 1 minute, waxing and waning.   He states then progressively over the past week since moving into a new home and lifting heavy boxes he has been experiencing increased shortness of breath than normal.  He also admits to having intermittent chest wall pain with mild to moderate exertion. He denies lower extremity edema, orthopnea or PND. He denies wearing home oxygen. Due to worsening pain over his left chest wall on 5/27/2022 while he was watching TV and drinking 2 beers, EMS was summoned [facility] Upon arrival to the ED: /70, heart rate 105, afebrile, 96% on room air. Labs: Sodium 140, potassium 3.3, BUN 20, creatinine 1.8 (recent baseline SCr 1.5-1.7 on 5/2022, magnesium 2.1, lactic acid 1.8, proBNP 435 (prior proBNP 792)  High-sensitivity troponin 68, 62 (prior hs-cTnT 42>>42>>63 on 5/12/2022), WBC 7.1, H/H12.4/39, plt 283. INR 2.7  CXR: No acute findings, low lung volumes with bibasilar hypoventilatory and atelectatic changes, stable cardiomegaly. EKG: Normal sinus rhythm, RBBB/LAFB, septal infarct pattern, rate 99 bpm.  ER medications: Aspirin 324 mg, potassium replacement 20 mEq x 1. Patient was ordered a Lexiscan MPS per primary. Cardiology was consulted for chest pain. Past Medical History:    1. Current tobacco use: 35 pack year smoker  2. ETOH use   3. HTN  4. HLD, on statin therapy   5. 1988 Testicular carcinoma  S/p chemotherapy and surgery ([facility])  6. 2015 CVA (with left-sided residual weakness)  7. 2015 Stent \"L neck\" after CVA [place] in [facility]  8. 9/10/2022 [facility]: Severe MR with restricted A1, A2, P2 scallops. 2 regurgiant jets. Mild functional MS. Mildly elevated gradient most likely due to mR. Valve arewa 4.1 cm. Moderate to severe AI. NORA 1.5 cm. Small, highly mobile artheroma is seen in proximal descending thoracic aorta. EF 55-60%. 9. 9/11/2020 Bilateral carotid Dopplers: 1-39% bilateral carotid stenosis  10. 9/11/2020 [facility] LAD mid vessel 95% stenosis.  LCx posterolateral extension 70% stenosis  11. 9/14/2020 @  [facility], AVR (#23 [employer] bileaflet mechanical)  and MVR (  #29 [employer] bileaflet mechanical valve)  12. [facility] [place] [place] with coumadin INR 2.5-3.5  13. 9/17/2020 Post-op TTE: EF 55-60%. Abnormal septal motion consistent with postoperative status. DD. Bileaflet mechanical AV present  14. 9/18/2020 Developed [facility] post-op EP was consulted  15. 9/22/2020 Medtronic Dual Chamber PPM inserted (). 16. 9/26/2020 Discharge from [facility] on ASA 81 mg QD, coumadin, Lipitor 80 mg QD, lopressor 25 mg BID,   17. 11/5/2020 [facility]: EF 55-60%. Abnormal septal motion consistent with post-operative status. #29 [employer] bileaflet mechanical valve. MV gradient 5.4 mmHg and trival prosthetic MR. Mildly elevated RVSP (35.3 mmHg). Mild to moderate TR. #23 [employer]'s bileaflet mechanical aortic valve with gradients 21.9/12.3 mmHg and DI of 0.53 and trivial AI. When compared to previous TTE AV gradients are similar. No MV gradients were obtained on previous TTE. [facility] 399 admission March 24, 2022 with chest pain and elevated troponin, no acute coronary syndrome and patient was in FRANTZ, troponins 61-54-40  19. Noncompliance with Coumadin, patient has been off medication for a month during the above admission  20. Right bundle branch block and left anterior fascicular block  21. Admission [facility] on 5/11/2022 with complaints of right leg pain / right hip pain / left sided chest pain that reportedly was traveling to his left shoulder, associated with shortness of breath. Was noted to have retroperitoneal hemorrhage in the setting of a supratherapeutic INR (>10). She was also noted to be hypokalemic. She was started on LR at 75 cc/h continuous. Normal cardiology was consulted for spontaneous retroperitoneal hematoma in the setting of a supratherapeutic INR (not actively bleeding at that time). Plans to reverse INR with IV vitamin K and order Kcentra. +Hemoccult stool.  Cardiology recommendations to resume Coumadin when INR improves (Goal INR 2.5-3.5). ---> Cardiology signed off. Patient was given 4U FFP. Coumadin was resumed on 2022. INR 1.5 (2022). Patient was discharged on 2022 with Lovenox bridging to coumadin. Patient was discharged to home with nurse.        Past Surgical History:  L humerus fracture requiring surgical repair with surgical plate (resulting limited ROM),  Bilateral hand surgery, testicular carcinoma with surgical removal of testicle.      Allergies:  NKDA     Social History:    Smoker 1 pack/day  ETOH:  Admits to 2-4 beers a day  Illicit Drugs  Activity: Live with friends in 2 story home (planning on getting apartment with finance). Do not have a car. Disability for heart surgery. NOK daughter Wilma aDvies (WBMVPIQ). No assistive devices  Code Status: Full Code       Family History: Mother  age 70 MI. No reported PCI/CABG. No reported PPM/ICD. No DM  Biological Father unknown arias history  No full siblings      Medications Prior to admit:  Prior to Admission medications    Medication Sig Start Date End Date Taking? Authorizing Provider   Enoxaparin Sodium (LOVENOX SC) Inject into the skin   Yes Historical Provider, MD   tiZANidine (ZANAFLEX) 4 MG tablet Take 1 tablet by mouth every 8 hours as needed (muscle spasms) 22   Harper Colvin MD   gabapentin (NEURONTIN) 300 MG capsule Take 1 capsule by mouth 3 times daily for 30 days.  22  Harper Colvin MD   warfarin (COUMADIN) 2.5 MG tablet Take 1 tablet by mouth daily 22   Harper Colvin MD   vitamin D (CHOLECALCIFEROL) 50 MCG (2000 UT) TABS tablet Take 1 tablet by mouth daily 22   Harper Colvin MD   Ergocalciferol (VITAMIN D) 97410 units CAPS Take 50,000 Units by mouth once a week 22   Harper Colvin MD   aspirin 81 MG EC tablet Take 1 tablet by mouth daily 3/27/22   Christiano Barraza MD   metoprolol succinate (TOPROL XL) 25 MG extended release tablet Take 25 mg by mouth daily     Historical Provider, MD ARIPiprazole (ABILIFY) 30 MG tablet Take 30 mg by mouth daily    Historical Provider, MD   furosemide (LASIX) 20 MG tablet Take 20 mg by mouth daily    Historical Provider, MD   rosuvastatin (CRESTOR) 40 MG tablet Take 40 mg by mouth daily    Historical Provider, MD       Current Medications:    No current facility-administered medications for this encounter. REVIEW OF SYSTEMS:     · Constitutional: +fatigue. Denies fevers, chills or night sweats  · Eyes: Denies visual changes or drainage  · ENT: Denies headaches or hearing loss. No mouth sores or sore throat. No epistaxis   · Cardiovascular: See HPI. No lower extremity swelling. · Respiratory: + Chronic PATEL. Denies cough, orthopnea or PND. No hemoptysis   · Gastrointestinal: Denies hematemesis or anorexia. No hematochezia or melena    · Genitourinary: Denies urgency, dysuria or hematuria. · Musculoskeletal: + Left-sided residual weakness since CVA 2015. Chronic left shoulder pain.  + Right and left lower extremity pain. Denies gait disturbance. · Integumentary: Denies rash, hives or pruritis   · Neurological: Denies dizziness, headaches or seizures. No numbness or tingling  · Psychiatric: Denies anxiety or depression. · Endocrine: Denies temperature intolerance. No recent weight change. .  · Hematologic/Lymphatic: Denies abnormal bruising or bleeding. No swollen lymph nodes    PHYSICAL EXAM:   /78   Pulse 89   Temp 98.7 °F (37.1 °C) (Oral)   Resp 18   Ht 5' 9\" (1.753 m)   Wt 245 lb (111.1 kg)   SpO2 98%   BMI 36.18 kg/m²   CONST:  Well developed, well nourished middle-aged obese  male who appears of stated age. Awake, alert and cooperative. No apparent distress. HEENT:   Head- Normocephalic, atraumatic   Eyes- Conjunctivae pink, anicteric  Throat- Oral mucosa pink and moist  Neck-  No stridor, trachea midline, no jugular venous distention. No carotid bruit. CHEST: Chest symmetrical and non-tender to palpation.  No accessory muscle use or intercostal retractions, well-healed mid sternotomy scar. Pacemaker site in right upper chest unremarkable  RESPIRATORY: Lung sounds - clear throughout fields. On room air  CARDIOVASCULAR:     Heart Inspection- shows no noted pulsations  Heart Palpation- no heaves or thrills; PMI is non-displaced   Heart Ausculation- Regular rate and rhythm,  +mechanical valve click present. No s3, s4 or rub   PV: No lower extremity edema. No varicosities. Pedal pulses palpable, no clubbing or cyanosis   ABDOMEN: Soft, obese, non-tender to light palpation. Bowel sounds present. No palpable masses no organomegaly; no abdominal bruit  MS: Good muscle strength and tone right upper extremity but unable to fully range of motion on left upper extremity. No atrophy or abnormal movements. : Deferred  SKIN: Warm and dry no statis dermatitis or ulcers   NEURO / PSYCH: Oriented to person, place and time. Speech clear and appropriate. Follows all commands. Flat affect. DATA:    EC2022 normal sinus rhythm, RBBB/LAFB, septal infarct pattern,, right 99 bpm.   Tele strips: SR/ST  Diagnostic:    Labs:   CBC:   Recent Labs     22   WBC 7.7   HGB 12.4*   HCT 39.1        BMP:   Recent Labs     22      K 3.3*   CO2 16*   BUN 20   CREATININE 1.8*   LABGLOM 39   CALCIUM 8.9     Mag:   Recent Labs     22  011   MG 2.1     Phos: No results for input(s): PHOS in the last 72 hours.   TFT:   Lab Results   Component Value Date    TSH 1.990 2022    T4FREE 1.01 2022      HgA1c:   Lab Results   Component Value Date    LABA1C 5.2 2022     No results found for: EAG  proBNP:   Recent Labs     22  011   PROBNP 435*     PT/INR:   Recent Labs     22   PROTIME 29.9*   INR 2.7     APTT:  Recent Labs     22   APTT 42.9*     CARDIAC ENZYMES:  Recent Labs     22  0401   TROPHS 68* 62*     FASTING LIPID PANEL:  Lab Results Component Value Date    CHOL 86 03/24/2022    HDL 28 03/24/2022    LDLCALC 28 03/24/2022    TRIG 149 03/24/2022     LIVER PROFILE:  Recent Labs     05/28/22  0110   AST 24   ALT 20   LABALBU 3.8       CXR: 5/27/2022   No acute findings.  Low lung volumes with bibasilar   hypoventilatory/atelectatic changes.  Stable cardiomegaly. Lexiscan MPS: pending. 11/5/2020 TTE [facility]: EF 55-60%. Abnormal septal motion consistent with post-operative status. #29 [employer] bileaflet mechanical valve. MV gradient 5.4 mmHg and trival prosthetic MR. Mildly elevated RVSP (35.3 mmHg). Mild to moderate TR. #23 [employer]'s bileaflet mechanical aortic valve with gradients 21.9/12.3 mmHg and DI of 0.53 and trivial AI. When compared to previous TTE AV gradients are similar. No MV gradients were obtained on previous TTE. ASSESSMENT/PLAN:   1. Chronically elevated hs-cTnT with atypical CP. Troponin elevation not consistent with ACS. Probably due to Acute on chronic renal insufficiency. 2. Status post CABG x1 with a LIMA to the LAD in 2020  3. Recent retroperitoneal hematoma in the setting of INR > 10. Coumadin held until 5/18/2022 --> bridged with Lovenox. INR 2.7 (today)   4. FRANTZ on CKD: (Recent baseline 1.5-1.7). Scr 1.8 (today) Status post CABG x1 with a LIMA to the LAD in 2020  5. Status post mechanical AVR and MVR in 2020 and on chronic Coumadin therapy with a goal INR of 3-3.5  6. Hypokalemia: K+ 3.3 --> replaced. 7. [facility] s/p Medtronic PPM 2020  8. HTN  9. HLD, on statin therapy   10. Obesity: BMI 36.18 kg/m2   11. Ongoing tobacco abuse/possible alcohol abuse  12. Limited ROM of L arm/shoulder secondary to humerus fracture  13. Right bundle branch block and left anterior fascicular block  14. Probable LEONOR     · Lexiscan MPS (pending, ordered per primary); will review when completed.    · Continue Coumadin  · Increase Toprol-XL to 50 mg QD  · Continue ASA and Crestor   · Tobacco and ETOH cessation   · Recommend outpatient sleep study. · Aggressive risk factor modifcation  · Okay for discharge from cardiology standpoint if no evidence of ischemia on stress test.  · Will follow. The above assessment and plan was made in collaboration with Dr. Cierra Mata. Electronically signed by ARNALDO Quintana CNP on 5/28/22 at 12:42 PM EDT    Patient chart reviewed with ARNALDO Senior. Patient not seen or examined as he was discharged prior to being seen by me. Assessment and plan were made in collaboration with ARNALDO Senior. 12year-old obese chronic smoker male known to me from recent admission on May 11, 2022 when he was seen in consultation with retroperitoneal bleed and supratherapeutic INR. The consult was obtained today due to chest and left shoulder pain. He has history of single-vessel CABG with LIMA to LAD and mechanical mitral and aortic valve done in September 2020, hypertension, hyperlipidemia, testicular cancer, bifascicular block, status post pacemaker implantation, status post XRT and chemotherapy, status post CVA in 2015, and noncompliance. Patient reported that he has been experiencing left chest wall \"sharp pains\" radiating into his left arm and down his left arm with numbness and tingling since 5/2021 after he had a left shoulder surgery. He reports having intermittent \"sharp pains\" lasting 1 minute, waxing and waning. His vital signs on admission: Blood pressure 106/70 and heart rate 105 bpm.    Labs:  Creatinine 1.8, potassium 3.3, INR 2.7. Hemoglobin 12. 4. proBNP 435. High sensitivity troponin 68 and 62. CXR: 5/27/2022   No acute findings.  Low lung volumes with bibasilar   hypoventilatory/atelectatic changes.  Stable cardiomegaly       Lexiscan done today:  No ischemia with an ejection fraction of 54%. Assessment and plan:  - Chronically elevated hs-cTnT with atypical CP.  Troponin elevation not consistent with ACS. Probably due to Acute on chronic renal insufficiency. Jason Merino revealed no ischemia.  - Status post CABG x1 with a LIMA to the LAD in 2020  - Recent retroperitoneal hematoma in the setting of INR > 10. Coumadin held until 5/18/2022 --> bridged with Lovenox. INR 2.7 (today)   - FRANTZ on CKD: (Recent baseline 1.5-1.7). Scr 1.8 (today) Status post CABG x1 with a LIMA to the LAD in 2020  - Status post mechanical AVR and MVR in 2020 and on chronic Coumadin therapy with a goal INR of 3-3.5  - Hypokalemia: K+ 3.3 --> replaced. - CHB s/p Medtronic PPM 2020  - HTN: Controlled  - HLD: On statin therapy   - Obesity with probable obstructive sleep apnea. -Chronic tobacco abuse/possible alcohol abuse      · Continue Coumadin  · Increase Toprol-XL to 50 mg QD  · Continue ASA and Crestor   · Tobacco and ETOH cessation   · Recommend outpatient sleep study. · Aggressive risk factor modifcation  · Okay for discharge from cardiology standpoint of view.     Thank you for allow me to share in the care of patient.

## 2022-05-28 NOTE — ED PROVIDER NOTES
ED PROVIDER NOTE    Chief Complaint   Patient presents with    Chest Pain     left sided intermittent. Tom Mar left chest to the left shoulder. pt states it is a cramping pain. no injury but states had shoulder sugery 6 months ago. upon arrival pain 0/10       HPI:  5/28/22,   Time: 12:38 AM EDT       Micheal Nixon is a 55 y.o. male presenting to the ED for chest pain. Acute onset at 2230 while sitting and drinking, left upper chest pressure, nonradiating, no aggravating/alleviating factors. Associated shortness of breath, nausea, diaphoresis. Self resolved after 20 minutes. No meds given by EMS. No recent illness, fever, chills, cough, diarrhea. On warfarin for afib. Hx of aortic and mitral valve replacements at [facility]. Review of Systems:     Review of Systems   Constitutional: Positive for diaphoresis. Negative for appetite change, chills and fever. HENT: Negative for congestion, rhinorrhea and trouble swallowing. Eyes: Negative for visual disturbance. Respiratory: Positive for shortness of breath. Negative for cough. Cardiovascular: Positive for chest pain. Negative for leg swelling. Gastrointestinal: Positive for nausea. Negative for abdominal pain, blood in stool, diarrhea and vomiting. Genitourinary: Negative for decreased urine volume, difficulty urinating, dysuria, frequency, hematuria and urgency. Musculoskeletal: Negative for myalgias, neck pain and neck stiffness. Skin: Negative for color change.    Neurological: Negative for dizziness, syncope, weakness, light-headedness, numbness and headaches.         --------------------------------------------- PAST HISTORY ---------------------------------------------  Past Medical History:   Past Medical History:   Diagnosis Date    H/O prosthetic aortic valve replacement 09/2020    [facility] - main  Mitral and pacemaker at this time as well    History of mitral valve replacement 09/2020    at [facility] - main  Aortic Valve placed at this time as well as pacemaker       Past Surgical History:   Past Surgical History:   Procedure Laterality Date    FRACTURE SURGERY      Left Tibial Plateau Fracture 0/72/7833     PACEMAKER PLACEMENT  09/2020       Social History:   Social History     Socioeconomic History    Marital status:      Spouse name: None    Number of children: None    Years of education: None    Highest education level: None   Occupational History    None   Tobacco Use    Smoking status: Current Every Day Smoker     Packs/day: 1.00     Types: Cigarettes    Smokeless tobacco: Never Used   Substance and Sexual Activity    Alcohol use: Yes     Comment: occassional    Drug use: Never    Sexual activity: None   Other Topics Concern    None   Social History Narrative    None     Social Determinants of Health     Financial Resource Strain:     Difficulty of Paying Living Expenses: Not on file   Food Insecurity:     Worried About Running Out of Food in the Last Year: Not on file    Danie of Food in the Last Year: Not on file   Transportation Needs:     Lack of Transportation (Medical): Not on file    Lack of Transportation (Non-Medical):  Not on file   Physical Activity:     Days of Exercise per Week: Not on file    Minutes of Exercise per Session: Not on file   Stress:     Feeling of Stress : Not on file   Social Connections:     Frequency of Communication with Friends and Family: Not on file    Frequency of Social Gatherings with Friends and Family: Not on file    Attends Rastafari Services: Not on file    Active Member of Clubs or Organizations: Not on file    Attends Club or Organization Meetings: Not on file    Marital Status: Not on file   Intimate Partner Violence:     Fear of Current or Ex-Partner: Not on file    Emotionally Abused: Not on file    Physically Abused: Not on file    Sexually Abused: Not on file   Housing Stability:     Unable to Pay for Housing in the Last Year: Not on file    Number of Places Lived in the Last Year: Not on file    Unstable Housing in the Last Year: Not on file       Family History:   History reviewed. No pertinent family history. The patients home medications have been reviewed. Allergies:   No Known Allergies        ---------------------------------------------------PHYSICAL EXAM--------------------------------------    /70   Pulse (!) 105   Temp 98.7 °F (37.1 °C) (Oral)   Resp 17   Ht 5' 9\" (1.753 m)   Wt 245 lb (111.1 kg)   SpO2 96%   BMI 36.18 kg/m²     Physical Exam  Vitals and nursing note reviewed. Constitutional:       General: He is not in acute distress. Appearance: He is not toxic-appearing. HENT:      Mouth/Throat:      Mouth: Mucous membranes are moist.   Eyes:      General: No scleral icterus. Extraocular Movements: Extraocular movements intact. Pupils: Pupils are equal, round, and reactive to light. Cardiovascular:      Rate and Rhythm: Normal rate and regular rhythm. Pulses: Normal pulses. Heart sounds: Normal heart sounds. No murmur heard. Pulmonary:      Effort: Pulmonary effort is normal. No respiratory distress. Breath sounds: Normal breath sounds. No wheezing or rales. Abdominal:      General: There is no distension. Palpations: Abdomen is soft. Tenderness: There is no abdominal tenderness. There is no guarding or rebound. Musculoskeletal:         General: No swelling or tenderness. Normal range of motion. Cervical back: Normal range of motion and neck supple. No rigidity. No muscular tenderness. Comments: Radial, DP, and PT pulses 2+ bilaterally. Skin:     General: Skin is warm and dry. Neurological:      Mental Status: He is alert and oriented to person, place, and time. Comments: Moves all extremities with appropriate strength. Sensation grossly intact in all extremities.              -------------------------------------------------- RESULTS -------------------------------------------------  I have personally reviewed all laboratory and imaging results for this patient. Results are listed below. LABS:  Labs Reviewed   CBC WITH AUTO DIFFERENTIAL - Abnormal; Notable for the following components:       Result Value    Hemoglobin 12.4 (*)     MCHC 31.7 (*)     RDW 15.8 (*)     Eosinophils % 7.1 (*)     Eosinophils Absolute 0.55 (*)     All other components within normal limits   COMPREHENSIVE METABOLIC PANEL - Abnormal; Notable for the following components:    Potassium 3.3 (*)     CO2 16 (*)     Anion Gap 17 (*)     Glucose 108 (*)     CREATININE 1.8 (*)     All other components within normal limits   TROPONIN - Abnormal; Notable for the following components:    Troponin, High Sensitivity 68 (*)     All other components within normal limits   BRAIN NATRIURETIC PEPTIDE - Abnormal; Notable for the following components:    Pro- (*)     All other components within normal limits   PROTIME-INR - Abnormal; Notable for the following components:    Protime 29.9 (*)     All other components within normal limits   APTT - Abnormal; Notable for the following components:    aPTT 42.9 (*)     All other components within normal limits   TROPONIN - Abnormal; Notable for the following components:    Troponin, High Sensitivity 62 (*)     All other components within normal limits   MAGNESIUM   LACTIC ACID   SPECIMEN REJECTION    Narrative:     CALL  [place]  H34 tel. ,  Rejected Test Name/Called to: [facility] to [address] [place] [address], 05/28/2022 03:09,  by Isaías Hook       RADIOLOGY:  Interpreted personally and by Radiologist.  XR CHEST PORTABLE   Final Result   No acute findings. Low lung volumes with bibasilar   hypoventilatory/atelectatic changes. Stable cardiomegaly. EKG:  This EKG is signed and interpreted by the EP.     Normal sinus rhythm, vent rate 99bpm, LAD, RBBB, no acute injury pattern, no clinically significant change compared w/ prior EKG ------------------------- NURSING NOTES AND VITALS REVIEWED ---------------------------   The nursing notes within the ED encounter and vital signs as below have been reviewed by myself. /70   Pulse (!) 105   Temp 98.7 °F (37.1 °C) (Oral)   Resp 17   Ht 5' 9\" (1.753 m)   Wt 245 lb (111.1 kg)   SpO2 96%   BMI 36.18 kg/m²   Oxygen Saturation Interpretation: Normal    The patients available past medical records and past encounters were reviewed. ------------------------------ ED COURSE/MEDICAL DECISION MAKING----------------------  Medications   potassium bicarb-citric acid (EFFER-K) effervescent tablet 20 mEq (20 mEq Oral Given 22 3137)   aspirin chewable tablet 324 mg (324 mg Oral Given 22 5427)       Counseling: The emergency provider has spoken with the patient and discussed todays results, in addition to providing specific details for the plan of care and counseling regarding the diagnosis and prognosis. Questions are answered at this time and they are agreeable with the plan. ED Course/Medical Decision Makin y.o. male here with chest pain. Non-toxic appearing, afebrile, hemodynamically stable, and in no acute distress. Breathing comfortably on room air without respiratory distress. Neurovascularly intact throughout. High sens troponin elevated, history concerning for possible cardiac etiology. Gave ASA 324mg and admitted in stable condition for further management.       --------------------------------- IMPRESSION AND DISPOSITION ---------------------------------    IMPRESSION  1. Chest pain, unspecified type        DISPOSITION  Disposition: Admit to telemetry  Patient condition is stable    NOTE: This report was transcribed using voice recognition software.  Every effort was made to ensure accuracy; however, inadvertent computerized transcription errors may be present    Denice Tatums, MD  Attending Emergency Physician        Denice Willis MD  [phone]

## 2022-05-28 NOTE — H&P
Hospitalist History & Physical      PCP: No primary care provider on file. Date of Service: Pt seen/examined on 5/28/2022 and is placed in observation. Chief Complaint:  had concerns including Chest Pain (left sided intermittent. Daja Dye left chest to the left shoulder. pt states it is a cramping pain. no injury but states had shoulder sugery 6 months ago. upon arrival pain 0/10). History of Present Illness:    Mr. Kassie Wilson, a 55y.o. year old male  who  has a past medical history of H/O prosthetic aortic valve replacement and History of mitral valve replacement. Patient presented to the ED with complaints of left sided chest pain radiating down his left arm as well as PATEL. He reports this started when lifting heavy boxes. He has a known hx of CAD s/p CABG    Of note patient was recently admitted from 5/12 - 5/20 after presenting with CP. At that time he was admitted for FRANTZ on CKD and also a spontaneous retroperitoneal hemorrhage 2/2 supratherapeutic INR. Cardiology was c/s given CP though did not feel CP to be cardiac in nature and did not pursue any further testing. ER COURSE:  In ED vitals were stable. Laboratory work-up remarkable for hypokalemia-3.3, CKD with creatinine mildly elevated from baseline at 1.8, chronically elevated troponin at baseline. CXR nonacute. EKG nonacute. Past Medical History:        Diagnosis Date    H/O prosthetic aortic valve replacement 09/2020    [facility] - main  Mitral and pacemaker at this time as well    History of mitral valve [facility] 09/2020    [facility] - main  Aortic Valve placed at this time as well as pacemaker     Past Surgical History:        Procedure Laterality Date    FRACTURE SURGERY      Left Tibial Plateau Fracture 7/73/7173     PACEMAKER PLACEMENT  09/2020     Medications Prior to Admission:      Prior to Admission medications    Medication Sig Start Date End Date Taking?  Authorizing Provider   Enoxaparin Sodium (LOVENOX SC) Inject into the skin   Yes Historical Provider, MD   tiZANidine (ZANAFLEX) 4 MG tablet Take 1 tablet by mouth every 8 hours as needed (muscle spasms) 5/20/22   Rome Christianson MD   gabapentin (NEURONTIN) 300 MG capsule Take 1 capsule by mouth 3 times daily for 30 days. 5/18/22 6/17/22  Rome Christianson MD   warfarin (COUMADIN) 2.5 MG tablet Take 1 tablet by mouth daily 5/18/22   Rome Christianson MD   vitamin D (CHOLECALCIFEROL) 50 MCG (2000 UT) TABS tablet Take 1 tablet by mouth daily 5/18/22   Rome Christianosn MD   Ergocalciferol (VITAMIN D) 38111 units CAPS Take 50,000 Units by mouth once a week 5/18/22   Rome Christianson MD   aspirin 81 MG EC tablet Take 1 tablet by mouth daily 3/27/22   Serjio Brown MD   metoprolol succinate (TOPROL XL) 25 MG extended release tablet Take 25 mg by mouth daily     Historical Provider, MD   ARIPiprazole (ABILIFY) 30 MG tablet Take 30 mg by mouth daily    Historical Provider, MD   furosemide (LASIX) 20 MG tablet Take 20 mg by mouth daily    Historical Provider, MD   rosuvastatin (CRESTOR) 40 MG tablet Take 40 mg by mouth daily    Historical Provider, MD       Allergies:  Patient has no known allergies. Social History:      TOBACCO:   reports that he has been smoking cigarettes. He has been smoking about 1.00 pack per day. He has never used smokeless tobacco.  ETOH:   reports current alcohol use. Family History:    History reviewed. No pertinent family history. Review of Systems: All bolded are positive; please see HPI  General:  Fever, chills, diaphoresis, fatigue, malaise, night sweats, weight loss  Psychological:  Anxiety, disorientation, hallucinations. ENT:  Epistaxis, headaches, vertigo, visual changes. Cardiovascular:  Chest pain, irregular heartbeats, palpitations, paroxysmal nocturnal dyspnea. Respiratory:  Shortness of breath, coughing, sputum production, hemoptysis, wheezing, orthopnea.   Gastrointestinal:  Nausea, vomiting, diarrhea, heartburn, constipation, abdominal pain, hematemesis, hematochezia, melena, acholic stools  Genito-Urinary:  Dysuria, urgency, frequency, hematuria  Musculoskeletal:  Joint pain, joint stiffness, joint swelling, muscle pain  Neurology:  Headache, focal neurological deficits, weakness, numbness, paresthesia  Derm:  Rashes, ulcers, excoriations, bruising  Extremities:  Decreased ROM, peripheral edema, mottling    Physical Exam:  /78   Pulse 89   Temp 98.7 °F (37.1 °C) (Oral)   Resp 18   Ht 5' 9\" (1.753 m)   Wt 245 lb (111.1 kg)   SpO2 98%   BMI 36.18 kg/m²   General appearance: Obese middle aged male in no apparent distress, appears stated age and cooperative. HEENT: Conjunctivae/corneas clear. Mucous membranes moist.  Neck: Supple. No JVD. Respiratory:  Clear to auscultation bilaterally. Normal respiratory effort. Cardiovascular:  RRR. S1, S2 without MRG. Valvular click. PV: Pulses palpable. No edema. Abdomen: Soft, non-tender, non-distended, rotund. +BS  Musculoskeletal: No obvious deformities. Skin: Normal skin color. No rashes or lesions. Good turgor. Neurologic:  Grossly non-focal. Awake, alert, following commands. Psychiatric: Alert and oriented, thought content appropriate, normal insight and judgement    Reviewed EKG and CXR personally      CBC:   Recent Labs     05/28/22 0110   WBC 7.7   RBC 3.93   HGB 12.4*   HCT 39.1   MCV 99.5   RDW 15.8*        BMP:   Recent Labs     05/28/22 0110      K 3.3*      CO2 16*   BUN 20   CREATININE 1.8*   MG 2.1     LFT:  Recent Labs     05/28/22 0110   PROT 7.1   ALKPHOS 125   ALT 20   AST 24   BILITOT 0.4     CE:  No results for input(s): Suki Belts in the last 72 hours. PT/INR:   Recent Labs     05/28/22 0110   INR 2.7   APTT 42.9*     BNP: No results for input(s): BNP in the last 72 hours.   ESR: No results found for: SEDRATE  CRP: No results found for: CRP  D Dimer:   Lab Results   Component Value Date    DDIMER 265 04/05/2022      Folate and B12:   Lab Results   Component Value Date    RIOVVXAL53 148 05/18/2022   ,   Lab Results   Component Value Date    FOLATE 8.9 05/18/2022     Lactic Acid:   Lab Results   Component Value Date    LACTA 1.8 05/28/2022     Thyroid Studies:   Lab Results   Component Value Date    TSH 1.990 05/12/2022       Oupatient labs:  Lab Results   Component Value Date    CHOL 86 03/24/2022    TRIG 149 03/24/2022    HDL 28 03/24/2022    LDLCALC 28 03/24/2022    TSH 1.990 05/12/2022    INR 2.7 05/28/2022    LABA1C 5.2 03/24/2022       Urinalysis:    Lab Results   Component Value Date    NITRU Negative 05/12/2022    WBCUA NONE 05/12/2022    BACTERIA NONE SEEN 05/12/2022    RBCUA 1-3 05/12/2022    BLOODU TRACE-INTACT 05/12/2022    SPECGRAV 1.010 05/12/2022    GLUCOSEU Negative 05/12/2022       Imaging:  XR CHEST (2 VW)    Result Date: 5/11/2022  1. There is no acute cardiopulmonary disease 2. Minimal atelectasis seen within the lingula     CT ABDOMEN PELVIS W IV CONTRAST Additional Contrast? None    Addendum Date: 5/19/2022    ADDENDUM: These findings were discussed with Dr. Ishmael Santana at 2:53 a.m. on 05/12/2022. ADDENDUM: Given the appearance of the left renal cyst, no further follow-up is required per CT criteria. Addendum Date: 5/12/2022    ADDENDUM: These findings were discussed with Dr. Ishmael Santnaa at 2:53 a.m. on 05/12/2022. Result Date: 5/19/2022  Findings most suggestive of a retroperitoneal hematoma involving the right iliacus muscle, as well as hematoma extending along the right pelvic sidewall, lateral to the bladder. In addition, mesenteric fat stranding it is also noted extending along the right pelvic mesentery. Correlation with serial hemoglobin and hematocrit levels is recommended. Nonobstructing renal calculi, as well as renal cysts. No evidence of a renal mass. Additional findings as above. XR CHEST PORTABLE    Result Date: 5/28/2022  No acute findings.   Low lung volumes with bibasilar hypoventilatory/atelectatic changes. Stable cardiomegaly. US DUP LOWER EXTREMITY RIGHT SARA    Result Date: 5/14/2022  Within the visualized vessels there is no evidence for deep venous thrombosis     XR HIP 2-3 VW W PELVIS RIGHT    Result Date: 5/11/2022  Unremarkable AP pelvis and the right hip without fracture or subluxation. Assessment:  Chest pain  Hypokalemia  Chronically elevated troponin at baseline   CKD, baseline cr 1.5-1.7  CAD s/p CABG x 1 in 2020  VHD s/p AVR + MVR in 2020  Anticoagulated on Coumadin  CHB s/p PPM placement  HTN  HLD  Obesity  Tobacco abuse  Suspected alcohol abuse  Vit D deficiency    Plan:  Admit to intermediate for observation  Resume home meds  Monitor and replace electrolytes PRN    Consult cardiology  [place]ican stress    Diet: Cardiac, carb controlled  Code Status: Full  Surrogate decision maker confirmed with patient:   Extended Emergency Contact Information  Primary Emergency Contact: casi melton  Home Phone: [phone]  Mobile Phone: [phone]  Relation: Other  Preferred language: English   needed? No    DVT Prophylaxis: [x]Lovenox []Heparin []PCD [x] [address] []Encouraged ambulation  Disposition: []Med/Surg [x] Intermediate [] ICU/CCU  Admit status: [x] Observation [] Inpatient     +++++++++++++++++++++++++++++++++++++++++++++++++  Candi Grider, C/ Os[place] [address]  +++++++++++++++++++++++++++++++++++++++++++++++++  NOTE: This report was transcribed using voice recognition software. Every effort was made to ensure accuracy; however, inadvertent computerized transcription errors may be present.

## 2022-05-31 LAB
EKG ATRIAL RATE: 99 BPM
EKG P-R INTERVAL: 160 MS
EKG Q-T INTERVAL: 384 MS
EKG QRS DURATION: 160 MS
EKG QTC CALCULATION (BAZETT): 492 MS
EKG R AXIS: -65 DEGREES
EKG T AXIS: 44 DEGREES
EKG VENTRICULAR RATE: 99 BPM

## 2022-08-22 ENCOUNTER — APPOINTMENT (OUTPATIENT)
Dept: CT IMAGING | Age: 57
DRG: 241 | End: 2022-08-22
Payer: COMMERCIAL

## 2022-08-22 ENCOUNTER — APPOINTMENT (OUTPATIENT)
Dept: GENERAL RADIOLOGY | Age: 57
DRG: 241 | End: 2022-08-22
Payer: COMMERCIAL

## 2022-08-22 ENCOUNTER — APPOINTMENT (OUTPATIENT)
Dept: ULTRASOUND IMAGING | Age: 57
DRG: 241 | End: 2022-08-22
Payer: COMMERCIAL

## 2022-08-22 ENCOUNTER — HOSPITAL ENCOUNTER (INPATIENT)
Age: 57
LOS: 7 days | Discharge: HOME | DRG: 241 | End: 2022-08-29
Attending: EMERGENCY MEDICINE | Admitting: INTERNAL MEDICINE
Payer: COMMERCIAL

## 2022-08-22 DIAGNOSIS — R10.9 CHRONIC ABDOMINAL PAIN: ICD-10-CM

## 2022-08-22 DIAGNOSIS — D64.9 ANEMIA, UNSPECIFIED TYPE: ICD-10-CM

## 2022-08-22 DIAGNOSIS — K92.2 GASTROINTESTINAL HEMORRHAGE, UNSPECIFIED GASTROINTESTINAL HEMORRHAGE TYPE: ICD-10-CM

## 2022-08-22 DIAGNOSIS — K81.0 ACUTE CHOLECYSTITIS: Primary | ICD-10-CM

## 2022-08-22 DIAGNOSIS — R79.1 SUPRATHERAPEUTIC INR: ICD-10-CM

## 2022-08-22 DIAGNOSIS — G89.29 CHRONIC ABDOMINAL PAIN: ICD-10-CM

## 2022-08-22 LAB
ABO/RH: NORMAL
ACETAMINOPHEN LEVEL: <5 MCG/ML (ref 10–30)
ALBUMIN SERPL-MCNC: 4.1 G/DL (ref 3.5–5.2)
ALP BLD-CCNC: 147 U/L (ref 40–129)
ALT SERPL-CCNC: 63 U/L (ref 0–40)
AMMONIA: 48 UMOL/L (ref 16–60)
AMPHETAMINE SCREEN, URINE: NOT DETECTED
ANION GAP SERPL CALCULATED.3IONS-SCNC: 14 MMOL/L (ref 7–16)
ANTIBODY SCREEN: NORMAL
APTT: 77.2 SEC (ref 24.5–35.1)
AST SERPL-CCNC: 96 U/L (ref 0–39)
BACTERIA: NORMAL /HPF
BARBITURATE SCREEN URINE: NOT DETECTED
BASOPHILS ABSOLUTE: 0.07 E9/L (ref 0–0.2)
BASOPHILS RELATIVE PERCENT: 1.3 % (ref 0–2)
BENZODIAZEPINE SCREEN, URINE: NOT DETECTED
BILIRUB SERPL-MCNC: 0.5 MG/DL (ref 0–1.2)
BILIRUBIN URINE: NEGATIVE
BLOOD, URINE: ABNORMAL
BUN BLDV-MCNC: 12 MG/DL (ref 6–20)
CALCIUM SERPL-MCNC: 8.9 MG/DL (ref 8.6–10.2)
CANNABINOID SCREEN URINE: NOT DETECTED
CHLORIDE BLD-SCNC: 103 MMOL/L (ref 98–107)
CHP ED QC CHECK: YES
CLARITY: CLEAR
CO2: 18 MMOL/L (ref 22–29)
COCAINE METABOLITE SCREEN URINE: NOT DETECTED
COLOR: YELLOW
CREAT SERPL-MCNC: 1.4 MG/DL (ref 0.7–1.2)
EKG ATRIAL RATE: 94 BPM
EKG P AXIS: 48 DEGREES
EKG P-R INTERVAL: 228 MS
EKG Q-T INTERVAL: 392 MS
EKG QRS DURATION: 146 MS
EKG QTC CALCULATION (BAZETT): 490 MS
EKG R AXIS: -51 DEGREES
EKG T AXIS: 5 DEGREES
EKG VENTRICULAR RATE: 94 BPM
EOSINOPHILS ABSOLUTE: 0.11 E9/L (ref 0.05–0.5)
EOSINOPHILS RELATIVE PERCENT: 2 % (ref 0–6)
ETHANOL: <10 MG/DL (ref 0–0.08)
FENTANYL SCREEN, URINE: NOT DETECTED
GFR AFRICAN AMERICAN: >60
GFR NON-AFRICAN AMERICAN: 52 ML/MIN/1.73
GLUCOSE BLD-MCNC: 113 MG/DL
GLUCOSE BLD-MCNC: 115 MG/DL (ref 74–99)
GLUCOSE URINE: NEGATIVE MG/DL
HCT VFR BLD CALC: 44.2 % (ref 37–54)
HEMOCCULT STL QL: POSITIVE
HEMOGLOBIN: 15 G/DL (ref 12.5–16.5)
IMMATURE GRANULOCYTES #: 0.02 E9/L
IMMATURE GRANULOCYTES %: 0.4 % (ref 0–5)
INR BLD: >10
KETONES, URINE: NEGATIVE MG/DL
LACTIC ACID: 2 MMOL/L (ref 0.5–2.2)
LEUKOCYTE ESTERASE, URINE: NEGATIVE
LIPASE: 27 U/L (ref 13–60)
LYMPHOCYTES ABSOLUTE: 0.93 E9/L (ref 1.5–4)
LYMPHOCYTES RELATIVE PERCENT: 16.7 % (ref 20–42)
Lab: NORMAL
MCH RBC QN AUTO: 31.1 PG (ref 26–35)
MCHC RBC AUTO-ENTMCNC: 33.9 % (ref 32–34.5)
MCV RBC AUTO: 91.7 FL (ref 80–99.9)
METER GLUCOSE: 113 MG/DL (ref 74–99)
METHADONE SCREEN, URINE: NOT DETECTED
MONOCYTES ABSOLUTE: 0.55 E9/L (ref 0.1–0.95)
MONOCYTES RELATIVE PERCENT: 9.9 % (ref 2–12)
NEUTROPHILS ABSOLUTE: 3.89 E9/L (ref 1.8–7.3)
NEUTROPHILS RELATIVE PERCENT: 69.7 % (ref 43–80)
NITRITE, URINE: NEGATIVE
OPIATE SCREEN URINE: NOT DETECTED
OXYCODONE URINE: NOT DETECTED
PDW BLD-RTO: 15.3 FL (ref 11.5–15)
PH UA: 6.5 (ref 5–9)
PHENCYCLIDINE SCREEN URINE: NOT DETECTED
PLATELET # BLD: 156 E9/L (ref 130–450)
PMV BLD AUTO: 11.4 FL (ref 7–12)
POTASSIUM REFLEX MAGNESIUM: 3.9 MMOL/L (ref 3.5–5)
PRO-BNP: 373 PG/ML (ref 0–125)
PROTEIN UA: 100 MG/DL
PROTHROMBIN TIME: >120 SEC (ref 9.3–12.4)
RBC # BLD: 4.82 E12/L (ref 3.8–5.8)
RBC UA: NORMAL /HPF (ref 0–2)
SALICYLATE, SERUM: <0.3 MG/DL (ref 0–30)
SODIUM BLD-SCNC: 135 MMOL/L (ref 132–146)
SPECIFIC GRAVITY UA: 1.01 (ref 1–1.03)
TOTAL PROTEIN: 7.2 G/DL (ref 6.4–8.3)
TRICYCLIC ANTIDEPRESSANTS SCREEN SERUM: NEGATIVE NG/ML
TROPONIN, HIGH SENSITIVITY: 71 NG/L (ref 0–11)
TROPONIN, HIGH SENSITIVITY: 73 NG/L (ref 0–11)
UROBILINOGEN, URINE: 0.2 E.U./DL
WBC # BLD: 5.6 E9/L (ref 4.5–11.5)
WBC UA: NORMAL /HPF (ref 0–5)

## 2022-08-22 PROCEDURE — 85730 THROMBOPLASTIN TIME PARTIAL: CPT

## 2022-08-22 PROCEDURE — 36415 COLL VENOUS BLD VENIPUNCTURE: CPT

## 2022-08-22 PROCEDURE — 99285 EMERGENCY DEPT VISIT HI MDM: CPT

## 2022-08-22 PROCEDURE — 76705 ECHO EXAM OF ABDOMEN: CPT

## 2022-08-22 PROCEDURE — 2140000000 HC CCU INTERMEDIATE R&B

## 2022-08-22 PROCEDURE — 83605 ASSAY OF LACTIC ACID: CPT

## 2022-08-22 PROCEDURE — 80307 DRUG TEST PRSMV CHEM ANLYZR: CPT

## 2022-08-22 PROCEDURE — 80179 DRUG ASSAY SALICYLATE: CPT

## 2022-08-22 PROCEDURE — 6360000002 HC RX W HCPCS: Performed by: EMERGENCY MEDICINE

## 2022-08-22 PROCEDURE — 2580000003 HC RX 258: Performed by: EMERGENCY MEDICINE

## 2022-08-22 PROCEDURE — 93005 ELECTROCARDIOGRAM TRACING: CPT | Performed by: PHYSICIAN ASSISTANT

## 2022-08-22 PROCEDURE — 83690 ASSAY OF LIPASE: CPT

## 2022-08-22 PROCEDURE — 96365 THER/PROPH/DIAG IV INF INIT: CPT

## 2022-08-22 PROCEDURE — 6370000000 HC RX 637 (ALT 250 FOR IP)

## 2022-08-22 PROCEDURE — 71046 X-RAY EXAM CHEST 2 VIEWS: CPT

## 2022-08-22 PROCEDURE — 84484 ASSAY OF TROPONIN QUANT: CPT

## 2022-08-22 PROCEDURE — 85610 PROTHROMBIN TIME: CPT

## 2022-08-22 PROCEDURE — 86900 BLOOD TYPING SEROLOGIC ABO: CPT

## 2022-08-22 PROCEDURE — 82077 ASSAY SPEC XCP UR&BREATH IA: CPT

## 2022-08-22 PROCEDURE — 82962 GLUCOSE BLOOD TEST: CPT

## 2022-08-22 PROCEDURE — 6360000002 HC RX W HCPCS: Performed by: INTERNAL MEDICINE

## 2022-08-22 PROCEDURE — 6370000000 HC RX 637 (ALT 250 FOR IP): Performed by: INTERNAL MEDICINE

## 2022-08-22 PROCEDURE — 2580000003 HC RX 258

## 2022-08-22 PROCEDURE — 6360000002 HC RX W HCPCS

## 2022-08-22 PROCEDURE — 80053 COMPREHEN METABOLIC PANEL: CPT

## 2022-08-22 PROCEDURE — 96375 TX/PRO/DX INJ NEW DRUG ADDON: CPT

## 2022-08-22 PROCEDURE — 6360000002 HC RX W HCPCS: Performed by: STUDENT IN AN ORGANIZED HEALTH CARE EDUCATION/TRAINING PROGRAM

## 2022-08-22 PROCEDURE — 83880 ASSAY OF NATRIURETIC PEPTIDE: CPT

## 2022-08-22 PROCEDURE — C9113 INJ PANTOPRAZOLE SODIUM, VIA: HCPCS | Performed by: EMERGENCY MEDICINE

## 2022-08-22 PROCEDURE — 87088 URINE BACTERIA CULTURE: CPT

## 2022-08-22 PROCEDURE — 85025 COMPLETE CBC W/AUTO DIFF WBC: CPT

## 2022-08-22 PROCEDURE — A4216 STERILE WATER/SALINE, 10 ML: HCPCS

## 2022-08-22 PROCEDURE — 74176 CT ABD & PELVIS W/O CONTRAST: CPT

## 2022-08-22 PROCEDURE — 82140 ASSAY OF AMMONIA: CPT

## 2022-08-22 PROCEDURE — 86850 RBC ANTIBODY SCREEN: CPT

## 2022-08-22 PROCEDURE — 81001 URINALYSIS AUTO W/SCOPE: CPT

## 2022-08-22 PROCEDURE — C9113 INJ PANTOPRAZOLE SODIUM, VIA: HCPCS

## 2022-08-22 PROCEDURE — 86901 BLOOD TYPING SEROLOGIC RH(D): CPT

## 2022-08-22 PROCEDURE — 80143 DRUG ASSAY ACETAMINOPHEN: CPT

## 2022-08-22 RX ORDER — PANTOPRAZOLE SODIUM 40 MG/10ML
80 INJECTION, POWDER, LYOPHILIZED, FOR SOLUTION INTRAVENOUS ONCE
Status: COMPLETED | OUTPATIENT
Start: 2022-08-22 | End: 2022-08-22

## 2022-08-22 RX ORDER — SODIUM CHLORIDE 9 MG/ML
INJECTION, SOLUTION INTRAVENOUS PRN
Status: DISCONTINUED | OUTPATIENT
Start: 2022-08-22 | End: 2022-08-29 | Stop reason: HOSPADM

## 2022-08-22 RX ORDER — OXYCODONE HYDROCHLORIDE AND ACETAMINOPHEN 5; 325 MG/1; MG/1
1 TABLET ORAL EVERY 4 HOURS PRN
Status: DISCONTINUED | OUTPATIENT
Start: 2022-08-22 | End: 2022-08-29 | Stop reason: HOSPADM

## 2022-08-22 RX ORDER — CHLORDIAZEPOXIDE HYDROCHLORIDE 5 MG/1
5 CAPSULE, GELATIN COATED ORAL 4 TIMES DAILY
Status: DISCONTINUED | OUTPATIENT
Start: 2022-08-22 | End: 2022-08-25

## 2022-08-22 RX ORDER — FENTANYL CITRATE 50 UG/ML
50 INJECTION, SOLUTION INTRAMUSCULAR; INTRAVENOUS ONCE
Status: COMPLETED | OUTPATIENT
Start: 2022-08-22 | End: 2022-08-22

## 2022-08-22 RX ORDER — TRAZODONE HYDROCHLORIDE 50 MG/1
50 TABLET ORAL NIGHTLY PRN
Status: DISCONTINUED | OUTPATIENT
Start: 2022-08-22 | End: 2022-08-29 | Stop reason: HOSPADM

## 2022-08-22 RX ORDER — SODIUM CHLORIDE 9 MG/ML
50 INJECTION, SOLUTION INTRAVENOUS ONCE
Status: COMPLETED | OUTPATIENT
Start: 2022-08-22 | End: 2022-08-22

## 2022-08-22 RX ORDER — WARFARIN SODIUM 2.5 MG/1
TABLET ORAL SEE ADMIN INSTRUCTIONS
Status: ON HOLD | COMMUNITY
End: 2022-08-29 | Stop reason: HOSPADM

## 2022-08-22 RX ADMIN — SODIUM CHLORIDE 40 MG: 9 INJECTION INTRAMUSCULAR; INTRAVENOUS; SUBCUTANEOUS at 19:46

## 2022-08-22 RX ADMIN — HYDROMORPHONE HYDROCHLORIDE 1 MG: 1 INJECTION, SOLUTION INTRAMUSCULAR; INTRAVENOUS; SUBCUTANEOUS at 23:30

## 2022-08-22 RX ADMIN — PIPERACILLIN AND TAZOBACTAM 4500 MG: 4; .5 INJECTION, POWDER, FOR SOLUTION INTRAVENOUS at 17:50

## 2022-08-22 RX ADMIN — PHYTONADIONE 5 MG: 10 INJECTION, EMULSION INTRAMUSCULAR; INTRAVENOUS; SUBCUTANEOUS at 15:15

## 2022-08-22 RX ADMIN — CHLORDIAZEPOXIDE HYDROCHLORIDE 5 MG: 5 CAPSULE ORAL at 19:42

## 2022-08-22 RX ADMIN — HYDROMORPHONE HYDROCHLORIDE 0.5 MG: 1 INJECTION, SOLUTION INTRAMUSCULAR; INTRAVENOUS; SUBCUTANEOUS at 17:47

## 2022-08-22 RX ADMIN — TRAZODONE HYDROCHLORIDE 50 MG: 50 TABLET ORAL at 23:30

## 2022-08-22 RX ADMIN — OXYCODONE AND ACETAMINOPHEN 1 TABLET: 5; 325 TABLET ORAL at 21:46

## 2022-08-22 RX ADMIN — PANTOPRAZOLE SODIUM 80 MG: 40 INJECTION, POWDER, FOR SOLUTION INTRAVENOUS at 17:25

## 2022-08-22 RX ADMIN — FENTANYL CITRATE 50 MCG: 50 INJECTION, SOLUTION INTRAMUSCULAR; INTRAVENOUS at 14:20

## 2022-08-22 RX ADMIN — PHYTONADIONE 5 MG: 10 INJECTION, EMULSION INTRAMUSCULAR; INTRAVENOUS; SUBCUTANEOUS at 17:56

## 2022-08-22 RX ADMIN — SODIUM CHLORIDE 50 ML: 9 INJECTION, SOLUTION INTRAVENOUS at 17:51

## 2022-08-22 RX ADMIN — HYDROMORPHONE HYDROCHLORIDE 0.5 MG: 1 INJECTION, SOLUTION INTRAMUSCULAR; INTRAVENOUS; SUBCUTANEOUS at 19:42

## 2022-08-22 RX ADMIN — PROTHROMBIN, COAGULATION FACTOR VII HUMAN, COAGULATION FACTOR IX HUMAN, COAGULATION FACTOR X HUMAN, PROTEIN C, PROTEIN S HUMAN, AND WATER 5000 UNITS: KIT at 17:06

## 2022-08-22 ASSESSMENT — ENCOUNTER SYMPTOMS
DIARRHEA: 1
CONSTIPATION: 0
NAUSEA: 0
RHINORRHEA: 0
ABDOMINAL DISTENTION: 0
COUGH: 0
VOMITING: 0
BLOOD IN STOOL: 1
ABDOMINAL PAIN: 1
SHORTNESS OF BREATH: 0
TROUBLE SWALLOWING: 0

## 2022-08-22 ASSESSMENT — PAIN SCALES - GENERAL
PAINLEVEL_OUTOF10: 10
PAINLEVEL_OUTOF10: 8
PAINLEVEL_OUTOF10: 10

## 2022-08-22 ASSESSMENT — PAIN DESCRIPTION - DESCRIPTORS: DESCRIPTORS: SHARP

## 2022-08-22 ASSESSMENT — PAIN DESCRIPTION - LOCATION
LOCATION: ABDOMEN

## 2022-08-22 ASSESSMENT — PAIN DESCRIPTION - ORIENTATION
ORIENTATION: RIGHT;UPPER

## 2022-08-22 ASSESSMENT — PAIN - FUNCTIONAL ASSESSMENT: PAIN_FUNCTIONAL_ASSESSMENT: 0-10

## 2022-08-22 NOTE — ED PROVIDER NOTES
Patient is a 52-year-old male with significant past medical history of aortic and mitral valve replacement, on anticoagulation who presents to the emergency department for right upper quadrant pain for a month. Patient describes as severe, radiating around to his flank, nothing makes it better, nothing makes it worse, throbbing, constant. Patient has been treating by drinking copious amounts of alcohol over the past month for the pain including up to 4 four-locos a day. Patient also endorses diarrhea for the past month including some black stool. He endorses that this is intermittent. Patient denies any fevers or chills, other abdominal pain, chest pain or shortness of breath. Patient denies headache, blurred vision double vision or any neurodeficits. Patient has been taking his Coumadin however does not check his INR. Patient denies any trauma. Patient has not seen a medical professional in the last month. Review of Systems   Constitutional:  Negative for appetite change, chills, fatigue and fever. HENT:  Negative for congestion, rhinorrhea and trouble swallowing. Eyes:  Negative for visual disturbance. Respiratory:  Negative for cough and shortness of breath. Cardiovascular:  Negative for chest pain, palpitations and leg swelling. Gastrointestinal:  Positive for abdominal pain, blood in stool and diarrhea. Negative for abdominal distention, constipation, nausea and vomiting. Endocrine: Negative for polyuria. Genitourinary:  Negative for dysuria, hematuria and urgency. Musculoskeletal:  Negative for arthralgias and myalgias. Skin:  Negative for rash and wound. Allergic/Immunologic: Negative for immunocompromised state. Neurological:  Negative for dizziness, syncope, weakness, light-headedness, numbness and headaches. Psychiatric/Behavioral:  Negative for confusion. The patient is not nervous/anxious. Physical Exam  Vitals and nursing note reviewed.    Constitutional: General: He is awake. He is not in acute distress. Appearance: He is obese. He is ill-appearing. HENT:      Head: Normocephalic and atraumatic. Mouth/Throat:      Mouth: Mucous membranes are dry. Pharynx: Oropharynx is clear. Eyes:      Extraocular Movements: Extraocular movements intact. Pupils: Pupils are equal, round, and reactive to light. Cardiovascular:      Rate and Rhythm: Regular rhythm. Tachycardia present. Pulses: Normal pulses. Radial pulses are 2+ on the right side and 2+ on the left side. Heart sounds: Normal heart sounds. Pulmonary:      Breath sounds: Normal breath sounds. Abdominal:      General: There is no distension. Palpations: Abdomen is soft. Tenderness: There is generalized abdominal tenderness and tenderness in the right upper quadrant. There is no guarding or rebound. Musculoskeletal:         General: Normal range of motion. Cervical back: Normal range of motion and neck supple. Right lower leg: No edema. Left lower leg: No edema. Skin:     General: Skin is warm and dry. Capillary Refill: Capillary refill takes less than 2 seconds. Neurological:      General: No focal deficit present. Mental Status: He is alert and oriented to person, place, and time. Psychiatric:         Behavior: Behavior is cooperative. MDM  Number of Diagnoses or Management Options  Acute cholecystitis  Gastrointestinal hemorrhage, unspecified gastrointestinal hemorrhage type  Supratherapeutic INR  Diagnosis management comments: Patient is a 52-year-old male who presents to the emergency department with complaint of right upper quadrant pain as well as bloody diarrhea and alcohol abuse. Patient presents awake, alert, mildly tremulous. Patient vital signs were noted. Physical exam shows tenderness to light palpation of the abdomen specifically in the right upper quadrant. Patient is 1 day from last drink.   Was treated with IV fluids and analgesia. Physical exam shows Hemoccult positive, patient was given Protonix for possible GI bleed. Work-up includes supratherapeutic INR, CAT scans are significant for cholecystitis. Ultrasound noted suboptimal study. General surgery was consulted and able to see the patient at bedside. General surgery requesting medicine to admit. General surgery will have plan for reversal of anticoagulant and antibiotics. Patient plan was discussed with patient and he is agreeable. Patient was brought to the emergency department until bed will be available and patient will be transferred to floor. Amount and/or Complexity of Data Reviewed  Clinical lab tests: ordered and reviewed  Tests in the radiology section of CPT®: ordered and reviewed       EKG: This EKG is signed and interpreted by me. Rate: 94  Rhythm: Sinus  Interpretation: sinus rhythm, prolonged FL interval, normal QRS, normal QT interval, no acute ST T waves changes, known bifascicular block  Comparison: stable as compared to patient's most recent EKG     --------------------------------------------- PAST HISTORY ---------------------------------------------  Past Medical History:  has a past medical history of H/O prosthetic aortic valve replacement and History of mitral valve replacement. Past Surgical History:  has a past surgical history that includes fracture surgery and pacemaker placement (09/2020). Social History:  reports that he has been smoking cigarettes. He has been smoking an average of 1 pack per day. He has never used smokeless tobacco. He reports current alcohol use. He reports that he does not use drugs. Family History: family history is not on file. The patients home medications have been reviewed. Allergies: Patient has no known allergies.     -------------------------------------------------- RESULTS -------------------------------------------------    Lab  Results for orders placed or performed during the hospital encounter of 08/22/22   Protime-INR   Result Value Ref Range    Protime >120.0 (H) 9.3 - 12.4 sec    INR >10.0 ()    APTT   Result Value Ref Range    aPTT 77.2 (H) 24.5 - 35.1 sec   CBC with Auto Differential   Result Value Ref Range    WBC 5.6 4.5 - 11.5 E9/L    RBC 4.82 3.80 - 5.80 E12/L    Hemoglobin 15.0 12.5 - 16.5 g/dL    Hematocrit 44.2 37.0 - 54.0 %    MCV 91.7 80.0 - 99.9 fL    MCH 31.1 26.0 - 35.0 pg    MCHC 33.9 32.0 - 34.5 %    RDW 15.3 (H) 11.5 - 15.0 fL    Platelets 615 142 - 082 E9/L    MPV 11.4 7.0 - 12.0 fL    Neutrophils % 69.7 43.0 - 80.0 %    Immature Granulocytes % 0.4 0.0 - 5.0 %    Lymphocytes % 16.7 (L) 20.0 - 42.0 %    Monocytes % 9.9 2.0 - 12.0 %    Eosinophils % 2.0 0.0 - 6.0 %    Basophils % 1.3 0.0 - 2.0 %    Neutrophils Absolute 3.89 1.80 - 7.30 E9/L    Immature Granulocytes # 0.02 E9/L    Lymphocytes Absolute 0.93 (L) 1.50 - 4.00 E9/L    Monocytes Absolute 0.55 0.10 - 0.95 E9/L    Eosinophils Absolute 0.11 0.05 - 0.50 E9/L    Basophils Absolute 0.07 0.00 - 0.20 E9/L   Comprehensive Metabolic Panel w/ Reflex to MG   Result Value Ref Range    Sodium 135 132 - 146 mmol/L    Potassium reflex Magnesium 3.9 3.5 - 5.0 mmol/L    Chloride 103 98 - 107 mmol/L    CO2 18 (L) 22 - 29 mmol/L    Anion Gap 14 7 - 16 mmol/L    Glucose 115 (H) 74 - 99 mg/dL    BUN 12 6 - 20 mg/dL    Creatinine 1.4 (H) 0.7 - 1.2 mg/dL    GFR Non-African American 52 >=60 mL/min/1.73    GFR African American >60     Calcium 8.9 8.6 - 10.2 mg/dL    Total Protein 7.2 6.4 - 8.3 g/dL    Albumin 4.1 3.5 - 5.2 g/dL    Total Bilirubin 0.5 0.0 - 1.2 mg/dL    Alkaline Phosphatase 147 (H) 40 - 129 U/L    ALT 63 (H) 0 - 40 U/L    AST 96 (H) 0 - 39 U/L   Troponin   Result Value Ref Range    Troponin, High Sensitivity 73 (H) 0 - 11 ng/L   Lipase   Result Value Ref Range    Lipase 27 13 - 60 U/L   Lactic Acid   Result Value Ref Range    Lactic Acid 2.0 0.5 - 2.2 mmol/L   Urinalysis   Result Value Ref Range Color, UA Yellow Straw/Yellow    Clarity, UA Clear Clear    Glucose, Ur Negative Negative mg/dL    Bilirubin Urine Negative Negative    Ketones, Urine Negative Negative mg/dL    Specific Gravity, UA 1.015 1.005 - 1.030    Blood, Urine SMALL (A) Negative    pH, UA 6.5 5.0 - 9.0    Protein,  (A) Negative mg/dL    Urobilinogen, Urine 0.2 <2.0 E.U./dL    Nitrite, Urine Negative Negative    Leukocyte Esterase, Urine Negative Negative   Brain Natriuretic Peptide   Result Value Ref Range    Pro- (H) 0 - 125 pg/mL   Serum Drug Screen   Result Value Ref Range    Ethanol Lvl <10 mg/dL    Acetaminophen Level <5.0 (L) 10.0 - 41.6 mcg/mL    Salicylate, Serum <8.2 0.0 - 30.0 mg/dL    TCA Scrn NEGATIVE Cutoff:300 ng/mL   URINE DRUG SCREEN   Result Value Ref Range    Amphetamine Screen, Urine NOT DETECTED Negative <1000 ng/mL    Barbiturate Screen, Ur NOT DETECTED Negative < 200 ng/mL    Benzodiazepine Screen, Urine NOT DETECTED Negative < 200 ng/mL    Cannabinoid Scrn, Ur NOT DETECTED Negative < 50ng/mL    Cocaine Metabolite Screen, Urine NOT DETECTED Negative < 300 ng/mL    Opiate Scrn, Ur NOT DETECTED Negative < 300ng/mL    PCP Screen, Urine NOT DETECTED Negative < 25 ng/mL    Methadone Screen, Urine NOT DETECTED Negative <300 ng/mL    Oxycodone Urine NOT DETECTED Negative <100 ng/mL    FENTANYL SCREEN, URINE NOT DETECTED Negative <1 ng/mL    Drug Screen Comment: see below    Ammonia   Result Value Ref Range    Ammonia 48.0 16.0 - 60.0 umol/L   Microscopic Urinalysis   Result Value Ref Range    WBC, UA NONE 0 - 5 /HPF    RBC, UA 2-5 0 - 2 /HPF    Bacteria, UA NONE SEEN None Seen /HPF   Troponin   Result Value Ref Range    Troponin, High Sensitivity 71 (H) 0 - 11 ng/L   POCT Glucose   Result Value Ref Range    Glucose 113 mg/dL    QC OK?  Yes    POCT Glucose   Result Value Ref Range    Meter Glucose 113 (H) 74 - 99 mg/dL   POCT occult blood stool   Result Value Ref Range    OCCULT BLOOD FECAL positive    EKG 12 Lead   Result Value Ref Range    Ventricular Rate 94 BPM    Atrial Rate 94 BPM    P-R Interval 228 ms    QRS Duration 146 ms    Q-T Interval 392 ms    QTc Calculation (Bazett) 490 ms    P Axis 48 degrees    R Axis -51 degrees    T Axis 5 degrees   TYPE AND SCREEN   Result Value Ref Range    ABO/Rh O NEG     Antibody Screen NEG        Radiology  US ABDOMEN LIMITED Specify organ? GALLBLADDER   Final Result   1. Suboptimal study, as described above. 2.  Fatty liver again noted, when compared to CT scan of the abdomen and   pelvis performed earlier in the day. 3.  Suggestion of cholelithiasis. Cholelithiasis was noted on the prior CT. 4.  Right renal cortical cyst.         XR CHEST (2 VW)   Final Result   Mild bibasilar subsegmental atelectasis. CT ABDOMEN PELVIS WO CONTRAST Additional Contrast? None   Final Result   Left nephrolithiasis. No ureteral calculus or obstructive uropathy. Dilated gallbladder and cholelithiasis. Other nonacute findings as described.           ------------------------- NURSING NOTES AND VITALS REVIEWED ---------------------------  Date / Time Roomed:  8/22/2022  1:21 PM  ED Bed Assignment:  07/07    The nursing notes within the ED encounter and vital signs as below have been reviewed.    Patient Vitals for the past 24 hrs:   BP Temp Temp src Pulse Resp SpO2   08/22/22 1530 (!) 172/103 -- -- 90 20 99 %   08/22/22 1420 (!) 190/112 -- -- -- 20 100 %   08/22/22 1120 -- 98.1 °F (36.7 °C) Oral -- -- --   08/22/22 1118 (!) 168/123 -- -- (!) 104 18 99 %       Oxygen Saturation Interpretation: Normal    ------------------------------------------ PROGRESS NOTES ------------------------------------------  Re-evaluation(s):  Patients symptoms are improving  Repeat physical examination is improved      I have spoken with the patient and discussed todays results, in addition to providing specific details for the plan of care and counseling regarding the diagnosis and prognosis. Their questions are answered at this time and they are agreeable with the plan.      --------------------------------- ADDITIONAL PROVIDER NOTES ---------------------------------  Consultations:  Spoke with hospitalist,  They will admit this patient. This patient's ED course included: a personal history and physicial examination, multiple bedside re-evaluations, IV medications, cardiac monitoring, and continuous pulse oximetry    This patient has remained hemodynamically stable, improved, and been closely monitored during their ED course. Please note that the withdrawal or failure to initiate urgent interventions for this patient would likely result in a life threatening deterioration or permanent disability. Clinical Impression  1. Acute cholecystitis    2. Gastrointestinal hemorrhage, unspecified gastrointestinal hemorrhage type    3. Supratherapeutic INR        Disposition  Patient's disposition: Admit to telemetry  Patient's condition is stable    8/22/22, 7:27 PM EDT.     This note is prepared by Yuliya Kearns MD -PGY- 3               Yuliya Kearns MD  Resident  08/22/22 9964

## 2022-08-22 NOTE — ED PROVIDER NOTES
ATTENDING PROVIDER ATTESTATION:     Vinny De Jesus presented to the emergency department for evaluation of Flank Pain (Pt reports right sided flank pain for 1 month. Hx of kidney disease. Has been \"drinking for the pain\" (malt liquor) ) and Shortness of Breath   and was initially evaluated by the Medical Resident. See Original ED Note for H&P and ED course above. I have reviewed and discussed the case, including pertinent history (medical, surgical, family and social) and exam findings with the Medical Resident assigned to Vinny De Jesus. I have personally performed and/or participated in the history, exam, medical decision making, and procedures and agree with all pertinent clinical information and any additional changes or corrections are noted below in my assessment and plan. I have discussed this patient in detail with the resident, and provided the instruction and education,       I have reviewed my findings and recommendations with the assigned Medical Resident, Vinny De Jesus and members of family present at the time of disposition. I have performed a history and physical examination of this patient and directly supervised the resident caring for this patient      History of Present Illness:    Presents to the ED for RUQ abdominal pain, beginning 1 month ago. The complaint has been constant, severe in severity, and worsened by nothing. Patient reports he has had right upper quadrant abdominal pain for a month. He reports is getting worse. He reports he has been self-medicating with alcohol. He reports the pain gets so bad it takes his breath away. He denies chest pain. He denies any real shortness of breath otherwise. He is fully anticoagulated with warfarin secondary to mechanical aortic and mitral valves. He reports he has not checked his INR in over a month. He does report he has had dark stools and some diarrhea as well. he denies other complaints.            Review of Systems:   A complete review of systems was performed and pertinent positives and negatives are stated within HPI, all other systems reviewed and are negative.    --------------------------------------------- PAST HISTORY ---------------------------------------------  Past Medical History:  has a past medical history of H/O prosthetic aortic valve replacement and History of mitral valve replacement. Past Surgical History:  has a past surgical history that includes fracture surgery and pacemaker placement (09/2020). Social History:  reports that he has been smoking cigarettes. He has been smoking an average of 1 pack per day. He has never used smokeless tobacco. He reports current alcohol use. He reports that he does not use drugs. Family History: family history is not on file. Unless otherwise noted, family history is non contributory    The patients home medications have been reviewed. Allergies: Patient has no known allergies. EKG Interpretation  Interpreted by emergency department physician, Dr. Kathleen Ca     8/22/22  Time: 8251    Rhythm: normal sinus   Rate: normal  Axis: left  Conduction: nonspecific interventricular conduction block  ST Segments: no acute change  T Waves: no acute change    Clinical Impression: Sinus rhythm, no acute ischemic changes  Comparison to Old EKG stable from prior      Physical Exam:  Constitutional/General: Alert and oriented x3  Head: Normocephalic and atraumatic  Eyes: PERRL, EOMI, sclera non icteric  ENT: Oropharynx clear, handling secretions  Neck: Supple, full ROM, no stridor, no meningeal signs  Respiratory: Lungs clear to auscultation bilaterally, no wheezes, rales, or rhonchi. Not in respiratory distress  Cardiovascular:  Regular rate. Regular rhythm. No murmurs, no gallops, no rubs. 2+ distal pulses. Equal extremity pulses. GI:  Abdomen Soft, right upper quadrant tenderness to palpation, negative Nelson sign Non distended. No rebound, guarding, or rigidity. No pulsatile masses. Musculoskeletal: Moves all extremities x 4. Warm and well perfused,  no clubbing, no cyanosis, no edema. Palpable peripheral pulses  Integument: skin warm and dry. No rashes. Neurologic: GCS 15, no focal deficits  Psychiatric: Normal Affect      I directly supervised any procedures performed by the resident and was present for the procedure including all critical portions of the procedure         I, Dr. Dakota Walter, am the primary provider of record    My Medical Decision Making:         GI bleed, melanotic stool, no gross blood. Supratherapeutic INR with INR greater than 10. Emergent reversal of coagulopathy initiated. Stat general surgery consulted. Imaging also concerning for cholecystitis. Medicine consulted for admission. CRITICAL CARE:  Please note that the withdrawal or failure to initiate urgent interventions for this patient would likely result in a life threatening deterioration or permanent disability. Accordingly this patient received 30 minutes of critical care time, including coordination of care, and direct bedside care and excluding separately billable procedures. 1. Acute cholecystitis    2. Gastrointestinal hemorrhage, unspecified gastrointestinal hemorrhage type    3.  Supratherapeutic INR           Lydia Michael MD  08/22/22 2100

## 2022-08-22 NOTE — H&P
Hospital Medicine History & Physical      PCP: No primary care provider on file. Date of Admission: 8/22/2022    Date of Service: Pt seen/examined on 8/22/22 and Admitted to Inpatient with expected LOS greater than two midnights due to medical therapy. Chief Complaint:  abdominal pain     History Of Present Illness:    65 y.o. male with a hx of abdominal pain, hx of aortic and mitral valve replacement, pacemaker in situ, hx of spontaneous retroperitoneal hemorrhage in 5/2022, alcohol abuse, medical noncompliance, CAD s/p CABG, hx of CVA, CKD stage III, HTN, HLD, vitamin D deficiency who presented to [address] [place] [address] with abdominal pain. Pt reports that the pain has been ongoing for the last 2 months or so and he has been self-treating with alcohol. Pain in the RUQ, sharp and shooting. Alcohol dulls the pain. It has made it difficult for him to sleep. Also describes recent melena over the last week. He has not had his INR checked despite taking warfarin for aortic and mitral valve replacement in 2020. In the ED, vitals significant for tachycardia and hypertension in the setting of pain score of 10/10. Labs significant for supratheraputic INR of > 10. Hgb 15 and elevated troponin to 73. FOBT positive. CT abdomen concerning for dilated gallbladder with cholelithiasis. Surgery consulted for concern for GI bleed and acute cholecystitis and plan on EGD/colonoscopy on 8/24. Pt was given 10 mg ov IV vitamin K, Kcentra, IV PPI bid and started on zosyn. Dilaudid given for pain. Upon my evaluation pt also reports sharp L sided chest pain associated with dyspnea on exertion. Reports frequent chest pain almost daily.  At times it is a pressure like sensation      Past Medical History:          Diagnosis Date    H/O prosthetic aortic valve replacement 09/2020    [facility] - main  Mitral and pacemaker at this time as well    History of mitral valve replacement 09/2020    at [facility] - main  Aortic Valve placed at this time as well as pacemaker       Past Surgical History:          Procedure Laterality Date    FRACTURE SURGERY      Left Tibial Plateau Fracture 2/72/1256     PACEMAKER PLACEMENT  09/2020       Medications Prior to Admission:      Prior to Admission medications    Medication Sig Start Date End Date Taking? Authorizing Provider   furosemide (LASIX) 20 MG tablet Take 1 tablet by mouth daily 5/29/22   ARNALDO Geiger NP   metoprolol succinate (TOPROL XL) 50 MG extended release tablet Take 1 tablet by mouth daily 5/28/22   ARNALDO Geiger NP   Enoxaparin Sodium (LOVENOX SC) Inject into the skin    Historical Provider, MD   tiZANidine (ZANAFLEX) 4 MG tablet Take 1 tablet by mouth every 8 hours as needed (muscle spasms) 5/20/22   Jim Pretty MD   gabapentin (NEURONTIN) 300 MG capsule Take 1 capsule by mouth 3 times daily for 30 days. 5/18/22 6/17/22  Jim Pretty MD   warfarin (COUMADIN) 2.5 MG tablet Take 1 tablet by mouth daily 5/18/22   Jim Pretty MD   vitamin D (CHOLECALCIFEROL) 50 MCG (2000 UT) TABS tablet Take 1 tablet by mouth daily 5/18/22   Jim Pretty MD   Ergocalciferol (VITAMIN D) 42463 units CAPS Take 50,000 Units by mouth once a week 5/18/22   Jim Pretty MD   aspirin 81 MG EC tablet Take 1 tablet by mouth daily 3/27/22   Erik Fitch MD   ARIPiprazole (ABILIFY) 30 MG tablet Take 30 mg by mouth daily    Historical Provider, MD   rosuvastatin (CRESTOR) 40 MG tablet Take 40 mg by mouth daily    Historical Provider, MD       Allergies:  Patient has no known allergies. Social History:      The patient currently lives at home alone    TOBACCO:   reports that he has been smoking cigarettes. He has been smoking an average of 1 pack per day. He has never used smokeless tobacco.  ETOH:   reports current alcohol use. Family History:      Reviewed in detail and negative for DM, CAD, Cancer, CVA. Positive as follows:    History reviewed.  No pertinent family history. REVIEW OF SYSTEMS:   Pertinent positives as noted in the HPI. All other systems reviewed and negative. PHYSICAL EXAM:    BP (!) 172/103   Pulse 90   Temp 98.1 °F (36.7 °C) (Oral)   Resp 20   SpO2 99%     General appearance:  No apparent distress, appears stated age and cooperative. HEENT:  Normal cephalic, atraumatic without obvious deformity. Pupils equal, round, and reactive to light. Extra ocular muscles intact. Conjunctivae/corneas clear. Neck: Supple, with full range of motion. No jugular venous distention. Trachea midline. Respiratory:  Normal respiratory effort. Clear to auscultation, bilaterally without Rales/Wheezes/Rhonchi. Cardiovascular:  Regular rate and rhythm with normal S1/S2 without murmurs, rubs or gallops. Abdomen: Soft, non-tender, non-distended with normal bowel sounds. Musculoskeletal:  No clubbing, cyanosis or edema bilaterally. Full range of motion without deformity. Skin: Skin color, texture, turgor normal.  No rashes or lesions. Neurologic:  Neurovascularly intact without any focal sensory/motor deficits. Cranial nerves: II-XII intact, grossly non-focal.  Psychiatric:  Alert and oriented, thought content appropriate, normal insight    US ABDOMEN LIMITED Specify organ? GALLBLADDER   Final Result   1. Suboptimal study, as described above. 2.  Fatty liver again noted, when compared to CT scan of the abdomen and   pelvis performed earlier in the day. 3.  Suggestion of cholelithiasis. Cholelithiasis was noted on the prior CT. 4.  Right renal cortical cyst.         XR CHEST (2 VW)   Final Result   Mild bibasilar subsegmental atelectasis. CT ABDOMEN PELVIS WO CONTRAST Additional Contrast? None   Final Result   Left nephrolithiasis. No ureteral calculus or obstructive uropathy. Dilated gallbladder and cholelithiasis. Other nonacute findings as described.            EKG with sinus rhythm, 1st degree AV block and bifascicular block    Labs:     Recent Labs     08/22/22  1122   WBC 5.6   HGB 15.0   HCT 44.2        Recent Labs     08/22/22  1131      K 3.9      CO2 18*   BUN 12   CREATININE 1.4*   CALCIUM 8.9     Recent Labs     08/22/22  1131   AST 96*   ALT 63*   BILITOT 0.5   ALKPHOS 147*     Recent Labs     08/22/22  1122   INR >10.0*     No results for input(s): CKTOTAL, TROPONINI in the last 72 hours. Urinalysis:      Lab Results   Component Value Date/Time    NITRU Negative 08/22/2022 11:31 AM    WBCUA NONE 08/22/2022 11:31 AM    BACTERIA NONE SEEN 08/22/2022 11:31 AM    RBCUA 2-5 08/22/2022 11:31 AM    BLOODU SMALL 08/22/2022 11:31 AM    SPECGRAV 1.015 08/22/2022 11:31 AM    GLUCOSEU Negative 08/22/2022 11:31 AM         ASSESSMENT:  Abdominal pain 2/2 acute cholecystitis  Hx of aortic and mitral valve replacement  Atypical chest pain   Pacemaker in situ  Acute GI bleed  Supratherapeutic INR  Hx of spontaneous retroperitoneal hemorrhage in 5/2022  Alcohol abuse  Medical noncompliance  Elevated troponin  CKD stage III  HTN  HLD  Vitamin D deficiency    PLAN:  Hold anticoagulation; repeat INR in am   Given elevated troponin and significant cardiac hx with consult cardiology, to help guide timeliness of warfarin resumption and assess cardiac state prior to EGD/colonoscopy  Continue zosyn, follow cultures  Continue IV PPI bid  CLD, golytely to start tomorrow with plan for EGD/colonoscopy with surgery on 8/24/22  Pain control  Pt last alcoholic beverage yesterday - will start on librium to avoid withdrawal due to intermittent withdrawal symptoms and recent heavy alcohol intake     DVT Prophylaxis: warfarin  Diet: ADULT DIET; Clear Liquid; No red dye  Diet NPO Exceptions are: Sips of Water with Meds  Code Status: Prior    PT/OT Eval Status: ordered    Dispo - home w [facility] at [facility]    Thank you No primary care provider on file. for the opportunity to be involved in this patient's care. If you have any questions or concerns please feel free to contact me through [address].

## 2022-08-22 NOTE — CONSULTS
(LASIX) 20 MG tablet Take 1 tablet by mouth daily 5/29/22   ARNALDO Rowe - NP   metoprolol succinate (TOPROL XL) 50 MG extended release tablet Take 1 tablet by mouth daily 5/28/22   ARNALDO Rowe NP   Enoxaparin Sodium (LOVENOX SC) Inject into the skin    Historical Provider, MD   tiZANidine (ZANAFLEX) 4 MG tablet Take 1 tablet by mouth every 8 hours as needed (muscle spasms) 5/20/22   Joesph Nino MD   gabapentin (NEURONTIN) 300 MG capsule Take 1 capsule by mouth 3 times daily for 30 days. 5/18/22 6/17/22  Joesph Nino MD   warfarin (COUMADIN) 2.5 MG tablet Take 1 tablet by mouth daily 5/18/22   Joesph Nino MD   vitamin D (CHOLECALCIFEROL) 50 MCG (2000 UT) TABS tablet Take 1 tablet by mouth daily 5/18/22   Joesph Nino MD   Ergocalciferol (VITAMIN D) 08461 units CAPS Take 50,000 Units by mouth once a week 5/18/22   Joesph Nino MD   aspirin 81 MG EC tablet Take 1 tablet by mouth daily 3/27/22   Juanito Lang MD   ARIPiprazole (ABILIFY) 30 MG tablet Take 30 mg by mouth daily    Historical Provider, MD   rosuvastatin (CRESTOR) 40 MG tablet Take 40 mg by mouth daily    Historical Provider, MD       No Known Allergies    History reviewed. No pertinent family history. Social History     Tobacco Use    Smoking status: Every Day     Packs/day: 1.00     Types: Cigarettes    Smokeless tobacco: Never   Substance Use Topics    Alcohol use: Yes     Comment: occassional    Drug use: Never         Review of Systems - all pertinent positives in HPI      PHYSICAL EXAM:    Vitals:    08/22/22 1530   BP: (!) 172/103   Pulse: 90   Resp: 20   Temp:    SpO2: 99%       General Appearance: Alert and oriented. Appears to be in discomfort  Skin: Warm and dry  Head/face:  NCAT  Eyes:  No gross abnormalities. and PERRL  Lungs: Normal expansion  Heart: Regular rate. Abdomen: Abdomen soft. Right upper quadrant TTP. Positive Nelson sign.    Extremities: Extremities warm to touch, pink, with no edema. Male Rectal: Normal rectal tone. Melenic stool. Hemoccult positive    LABS:  CBC  Recent Labs     08/22/22  1122   WBC 5.6   HGB 15.0   HCT 44.2        BMP  Recent Labs     08/22/22  1131      K 3.9      CO2 18*   BUN 12   CREATININE 1.4*   CALCIUM 8.9     Liver Function  Recent Labs     08/22/22  1131   LIPASE 27   BILITOT 0.5   AST 96*   ALT 63*   ALKPHOS 147*   PROT 7.2   LABALBU 4.1     No results for input(s): LACTATE in the last 72 hours. Recent Labs     08/22/22  1122   INR >10.0*       RADIOLOGY  CT ABDOMEN PELVIS WO CONTRAST Additional Contrast? None    Result Date: 8/22/2022  EXAMINATION: CT OF THE ABDOMEN AND PELVIS WITHOUT CONTRAST 8/22/2022 12:01 pm TECHNIQUE: CT of the abdomen and pelvis was performed without the administration of intravenous contrast. Multiplanar reformatted images are provided for review. Automated exposure control, iterative reconstruction, and/or weight based adjustment of the mA/kV was utilized to reduce the radiation dose to as low as reasonably achievable. COMPARISON: 05/12/2022. HISTORY: ORDERING SYSTEM PROVIDED HISTORY: right fank pain TECHNOLOGIST PROVIDED HISTORY: Reason for exam:->right fank pain Additional Contrast?->None Decision Support Exception - unselect if not a suspected or confirmed emergency medical condition->Emergency Medical Condition (MA) What reading provider will be dictating this exam?->CRC FINDINGS: Images of the lower chest are unremarkable. The liver demonstrates mild diffuse decreased attenuation consistent with fatty metamorphosis. The spleen, pancreas, and both adrenal glands appear unremarkable. There are cysts in both kidneys. There also several tiny nonobstructing calculi in the left kidney. No ureteral calculus is identified. No hydronephrosis or hydroureter is seen on the right or left. The urinary bladder appears unremarkable. The gallbladder is dilated. There is cholelithiasis.   There is no biliary as noted on the CT study. There is no gross evidence of gallbladder wall thickening. No pericholecystic fluid is identified. Common bile duct cannot be adequately visualized. RIGHT KIDNEY: The right kidney measures 10.6 cm in length by 5.5 cm in AP diameter by 5.9 cm in width. A 0.9 x 0.8 x 0.9 cm renal cortical cyst is noted. Cysts were identified on the CT study. There is no evidence of hydronephrosis or renal calculi. PANCREAS:  The pancreas is obscured by overlying bowel gas. OTHER: No evidence of right upper quadrant ascites. 1.  Suboptimal study, as described above. 2.  Fatty liver again noted, when compared to CT scan of the abdomen and pelvis performed earlier in the day. 3.  Suggestion of cholelithiasis. Cholelithiasis was noted on the prior CT.  4.  Right renal cortical cyst.         ASSESSMENT:  65 y.o. male with abdominal pain and melena, supratherapeutic INR with concern for GI bleed and possible cholecystitis    PLAN:  - Coumadin INR reversal with Kcentra and vitamin K  - Trend H/H  - Plan for EGD/colonoscopy 8/24  - Clear liquid diet, golytely tomorrow   - start zosyn for empiric coverage     Plan discussed with Dr. Kory Chino    Electronically signed by Shabnam Day MD on 8/22/22 at 4:06 PM TILAT

## 2022-08-22 NOTE — ED NOTES
Department of Emergency Medicine    FIRST PROVIDER TRIAGE NOTE             Independent MLP           8/22/22  11:18 AM EDT    Date of Encounter: 8/22/22   MRN: [id number]    Vitals:    08/22/22 1118 08/22/22 1120   BP: (!) 168/123    Pulse: (!) 104    Resp: 18    Temp:  98.1 °F (36.7 °C)   TempSrc:  Oral   SpO2: 99%         HPI: Dalene Cogan is a 65 y.o. male who presents to the ED for Flank Pain (Pt reports right sided flank pain for 1 month. Hx of kidney disease)  He also reports diarrhea and SOB     Patient states he has been \"drinking for the pain\"  - malt liquor     ROS: Negative for fever or vomiting. Physical Exam:   Gen Appearance/Constitutional: alert  CV: regular rate     Initial Plan of Care: All treatment areas with department are currently occupied. Plan to order/Initiate the following while awaiting opening in ED: labs, EKG, and imaging studies.     Initial Plan of Care: Initiate Treatment-Testing, Proceed toTreatment Area When Bed Available for ED Attending/MLP to Continue Care    Electronically signed by Svetlana Steven PA-C   DD: 8/22/22       Svetlana Steven PA-C  08/22/22 1121

## 2022-08-23 PROBLEM — K92.2 GASTROINTESTINAL HEMORRHAGE: Status: ACTIVE | Noted: 2022-08-23

## 2022-08-23 LAB
ANION GAP SERPL CALCULATED.3IONS-SCNC: 14 MMOL/L (ref 7–16)
APTT: 35.3 SEC (ref 24.5–35.1)
APTT: 50.8 SEC (ref 24.5–35.1)
APTT: 84.7 SEC (ref 24.5–35.1)
BASOPHILS ABSOLUTE: 0.06 E9/L (ref 0–0.2)
BASOPHILS RELATIVE PERCENT: 1.3 % (ref 0–2)
BUN BLDV-MCNC: 13 MG/DL (ref 6–20)
CALCIUM SERPL-MCNC: 8.5 MG/DL (ref 8.6–10.2)
CHLORIDE BLD-SCNC: 103 MMOL/L (ref 98–107)
CO2: 21 MMOL/L (ref 22–29)
CREAT SERPL-MCNC: 1.7 MG/DL (ref 0.7–1.2)
EOSINOPHILS ABSOLUTE: 0.32 E9/L (ref 0.05–0.5)
EOSINOPHILS RELATIVE PERCENT: 6.9 % (ref 0–6)
GFR AFRICAN AMERICAN: 51
GFR NON-AFRICAN AMERICAN: 42 ML/MIN/1.73
GLUCOSE BLD-MCNC: 110 MG/DL (ref 74–99)
HCT VFR BLD CALC: 42.6 % (ref 37–54)
HEMOGLOBIN: 14.1 G/DL (ref 12.5–16.5)
IMMATURE GRANULOCYTES #: 0.01 E9/L
IMMATURE GRANULOCYTES %: 0.2 % (ref 0–5)
INR BLD: 1.3
INR BLD: 1.3
LYMPHOCYTES ABSOLUTE: 1 E9/L (ref 1.5–4)
LYMPHOCYTES RELATIVE PERCENT: 21.6 % (ref 20–42)
MAGNESIUM: 1.5 MG/DL (ref 1.6–2.6)
MCH RBC QN AUTO: 31.5 PG (ref 26–35)
MCHC RBC AUTO-ENTMCNC: 33.1 % (ref 32–34.5)
MCV RBC AUTO: 95.1 FL (ref 80–99.9)
MONOCYTES ABSOLUTE: 0.48 E9/L (ref 0.1–0.95)
MONOCYTES RELATIVE PERCENT: 10.3 % (ref 2–12)
NEUTROPHILS ABSOLUTE: 2.77 E9/L (ref 1.8–7.3)
NEUTROPHILS RELATIVE PERCENT: 59.7 % (ref 43–80)
PDW BLD-RTO: 15.2 FL (ref 11.5–15)
PLATELET # BLD: 110 E9/L (ref 130–450)
PMV BLD AUTO: 12.1 FL (ref 7–12)
POTASSIUM REFLEX MAGNESIUM: 3.5 MMOL/L (ref 3.5–5)
PROTHROMBIN TIME: 14.4 SEC (ref 9.3–12.4)
PROTHROMBIN TIME: 14.7 SEC (ref 9.3–12.4)
RBC # BLD: 4.48 E12/L (ref 3.8–5.8)
SODIUM BLD-SCNC: 138 MMOL/L (ref 132–146)
WBC # BLD: 4.6 E9/L (ref 4.5–11.5)

## 2022-08-23 PROCEDURE — 6360000002 HC RX W HCPCS

## 2022-08-23 PROCEDURE — 6370000000 HC RX 637 (ALT 250 FOR IP)

## 2022-08-23 PROCEDURE — 6370000000 HC RX 637 (ALT 250 FOR IP): Performed by: INTERNAL MEDICINE

## 2022-08-23 PROCEDURE — A4216 STERILE WATER/SALINE, 10 ML: HCPCS

## 2022-08-23 PROCEDURE — 2580000003 HC RX 258

## 2022-08-23 PROCEDURE — C9113 INJ PANTOPRAZOLE SODIUM, VIA: HCPCS

## 2022-08-23 PROCEDURE — 99222 1ST HOSP IP/OBS MODERATE 55: CPT | Performed by: SURGERY

## 2022-08-23 PROCEDURE — 85610 PROTHROMBIN TIME: CPT

## 2022-08-23 PROCEDURE — 2140000000 HC CCU INTERMEDIATE R&B

## 2022-08-23 PROCEDURE — APPSS60 APP SPLIT SHARED TIME 46-60 MINUTES: Performed by: PHYSICIAN ASSISTANT

## 2022-08-23 PROCEDURE — 36415 COLL VENOUS BLD VENIPUNCTURE: CPT

## 2022-08-23 PROCEDURE — 6360000002 HC RX W HCPCS: Performed by: STUDENT IN AN ORGANIZED HEALTH CARE EDUCATION/TRAINING PROGRAM

## 2022-08-23 PROCEDURE — 6370000000 HC RX 637 (ALT 250 FOR IP): Performed by: STUDENT IN AN ORGANIZED HEALTH CARE EDUCATION/TRAINING PROGRAM

## 2022-08-23 PROCEDURE — 85730 THROMBOPLASTIN TIME PARTIAL: CPT

## 2022-08-23 PROCEDURE — 2580000003 HC RX 258: Performed by: INTERNAL MEDICINE

## 2022-08-23 PROCEDURE — 83735 ASSAY OF MAGNESIUM: CPT

## 2022-08-23 PROCEDURE — 85025 COMPLETE CBC W/AUTO DIFF WBC: CPT

## 2022-08-23 PROCEDURE — 80048 BASIC METABOLIC PNL TOTAL CA: CPT

## 2022-08-23 PROCEDURE — 99254 IP/OBS CNSLTJ NEW/EST MOD 60: CPT | Performed by: INTERNAL MEDICINE

## 2022-08-23 RX ORDER — METOPROLOL SUCCINATE 25 MG/1
25 TABLET, EXTENDED RELEASE ORAL DAILY
Status: DISCONTINUED | OUTPATIENT
Start: 2022-08-23 | End: 2022-08-29 | Stop reason: HOSPADM

## 2022-08-23 RX ORDER — ONDANSETRON 2 MG/ML
4 INJECTION INTRAMUSCULAR; INTRAVENOUS EVERY 6 HOURS PRN
Status: DISCONTINUED | OUTPATIENT
Start: 2022-08-23 | End: 2022-08-29 | Stop reason: HOSPADM

## 2022-08-23 RX ORDER — POLYETHYLENE GLYCOL 3350 17 G
2 POWDER IN PACKET (EA) ORAL
Status: DISCONTINUED | OUTPATIENT
Start: 2022-08-23 | End: 2022-08-29 | Stop reason: HOSPADM

## 2022-08-23 RX ORDER — HEPARIN SODIUM 1000 [USP'U]/ML
4000 INJECTION, SOLUTION INTRAVENOUS; SUBCUTANEOUS ONCE
Status: COMPLETED | OUTPATIENT
Start: 2022-08-23 | End: 2022-08-23

## 2022-08-23 RX ORDER — MAGNESIUM SULFATE IN WATER 40 MG/ML
2000 INJECTION, SOLUTION INTRAVENOUS ONCE
Status: COMPLETED | OUTPATIENT
Start: 2022-08-23 | End: 2022-08-23

## 2022-08-23 RX ORDER — SODIUM CHLORIDE 9 MG/ML
INJECTION, SOLUTION INTRAVENOUS PRN
Status: DISCONTINUED | OUTPATIENT
Start: 2022-08-23 | End: 2022-08-29 | Stop reason: HOSPADM

## 2022-08-23 RX ORDER — HEPARIN SODIUM 10000 [USP'U]/100ML
5-30 INJECTION, SOLUTION INTRAVENOUS CONTINUOUS
Status: DISCONTINUED | OUTPATIENT
Start: 2022-08-23 | End: 2022-08-29 | Stop reason: HOSPADM

## 2022-08-23 RX ORDER — HEPARIN SODIUM 1000 [USP'U]/ML
4000 INJECTION, SOLUTION INTRAVENOUS; SUBCUTANEOUS PRN
Status: DISCONTINUED | OUTPATIENT
Start: 2022-08-23 | End: 2022-08-29 | Stop reason: HOSPADM

## 2022-08-23 RX ORDER — ONDANSETRON 4 MG/1
4 TABLET, ORALLY DISINTEGRATING ORAL EVERY 8 HOURS PRN
Status: DISCONTINUED | OUTPATIENT
Start: 2022-08-23 | End: 2022-08-29 | Stop reason: HOSPADM

## 2022-08-23 RX ORDER — ACETAMINOPHEN 650 MG/1
650 SUPPOSITORY RECTAL EVERY 6 HOURS PRN
Status: DISCONTINUED | OUTPATIENT
Start: 2022-08-23 | End: 2022-08-29 | Stop reason: HOSPADM

## 2022-08-23 RX ORDER — ACETAMINOPHEN 325 MG/1
650 TABLET ORAL EVERY 6 HOURS PRN
Status: DISCONTINUED | OUTPATIENT
Start: 2022-08-23 | End: 2022-08-29 | Stop reason: HOSPADM

## 2022-08-23 RX ORDER — SODIUM CHLORIDE 0.9 % (FLUSH) 0.9 %
5-40 SYRINGE (ML) INJECTION EVERY 12 HOURS SCHEDULED
Status: DISCONTINUED | OUTPATIENT
Start: 2022-08-23 | End: 2022-08-29 | Stop reason: HOSPADM

## 2022-08-23 RX ORDER — SODIUM CHLORIDE 0.9 % (FLUSH) 0.9 %
5-40 SYRINGE (ML) INJECTION PRN
Status: DISCONTINUED | OUTPATIENT
Start: 2022-08-23 | End: 2022-08-29 | Stop reason: HOSPADM

## 2022-08-23 RX ORDER — HEPARIN SODIUM 1000 [USP'U]/ML
2000 INJECTION, SOLUTION INTRAVENOUS; SUBCUTANEOUS PRN
Status: DISCONTINUED | OUTPATIENT
Start: 2022-08-23 | End: 2022-08-29 | Stop reason: HOSPADM

## 2022-08-23 RX ORDER — POLYETHYLENE GLYCOL 3350 17 G/17G
17 POWDER, FOR SOLUTION ORAL DAILY PRN
Status: DISCONTINUED | OUTPATIENT
Start: 2022-08-23 | End: 2022-08-29 | Stop reason: HOSPADM

## 2022-08-23 RX ORDER — POTASSIUM CHLORIDE 20 MEQ/1
40 TABLET, EXTENDED RELEASE ORAL ONCE
Status: COMPLETED | OUTPATIENT
Start: 2022-08-23 | End: 2022-08-23

## 2022-08-23 RX ADMIN — SODIUM CHLORIDE 40 MG: 9 INJECTION INTRAMUSCULAR; INTRAVENOUS; SUBCUTANEOUS at 17:47

## 2022-08-23 RX ADMIN — PIPERACILLIN AND TAZOBACTAM 3375 MG: 3; .375 INJECTION, POWDER, LYOPHILIZED, FOR SOLUTION INTRAVENOUS at 09:08

## 2022-08-23 RX ADMIN — HEPARIN SODIUM 4000 UNITS: 1000 INJECTION, SOLUTION INTRAVENOUS; SUBCUTANEOUS at 02:01

## 2022-08-23 RX ADMIN — PIPERACILLIN AND TAZOBACTAM 3375 MG: 3; .375 INJECTION, POWDER, LYOPHILIZED, FOR SOLUTION INTRAVENOUS at 02:01

## 2022-08-23 RX ADMIN — CHLORDIAZEPOXIDE HYDROCHLORIDE 5 MG: 5 CAPSULE ORAL at 09:04

## 2022-08-23 RX ADMIN — CHLORDIAZEPOXIDE HYDROCHLORIDE 5 MG: 5 CAPSULE ORAL at 12:17

## 2022-08-23 RX ADMIN — HYDROMORPHONE HYDROCHLORIDE 1 MG: 1 INJECTION, SOLUTION INTRAMUSCULAR; INTRAVENOUS; SUBCUTANEOUS at 15:38

## 2022-08-23 RX ADMIN — HEPARIN SODIUM 6.8 UNITS/KG/HR: 10000 INJECTION, SOLUTION INTRAVENOUS at 09:04

## 2022-08-23 RX ADMIN — CHLORDIAZEPOXIDE HYDROCHLORIDE 5 MG: 5 CAPSULE ORAL at 21:19

## 2022-08-23 RX ADMIN — HEPARIN SODIUM 8.8 UNITS/KG/HR: 10000 INJECTION, SOLUTION INTRAVENOUS at 15:51

## 2022-08-23 RX ADMIN — METOPROLOL SUCCINATE 25 MG: 25 TABLET, EXTENDED RELEASE ORAL at 17:47

## 2022-08-23 RX ADMIN — HYDROMORPHONE HYDROCHLORIDE 1 MG: 1 INJECTION, SOLUTION INTRAMUSCULAR; INTRAVENOUS; SUBCUTANEOUS at 09:04

## 2022-08-23 RX ADMIN — HYDROMORPHONE HYDROCHLORIDE 1 MG: 1 INJECTION, SOLUTION INTRAMUSCULAR; INTRAVENOUS; SUBCUTANEOUS at 18:57

## 2022-08-23 RX ADMIN — ONDANSETRON 4 MG: 4 TABLET, ORALLY DISINTEGRATING ORAL at 09:11

## 2022-08-23 RX ADMIN — OXYCODONE AND ACETAMINOPHEN 1 TABLET: 5; 325 TABLET ORAL at 01:59

## 2022-08-23 RX ADMIN — NICOTINE POLACRILEX 2 MG: 2 LOZENGE ORAL at 15:38

## 2022-08-23 RX ADMIN — HYDROMORPHONE HYDROCHLORIDE 1 MG: 1 INJECTION, SOLUTION INTRAMUSCULAR; INTRAVENOUS; SUBCUTANEOUS at 12:17

## 2022-08-23 RX ADMIN — ONDANSETRON 4 MG: 4 TABLET, ORALLY DISINTEGRATING ORAL at 17:51

## 2022-08-23 RX ADMIN — PIPERACILLIN AND TAZOBACTAM 3375 MG: 3; .375 INJECTION, POWDER, LYOPHILIZED, FOR SOLUTION INTRAVENOUS at 17:51

## 2022-08-23 RX ADMIN — HYDROMORPHONE HYDROCHLORIDE 1 MG: 1 INJECTION, SOLUTION INTRAMUSCULAR; INTRAVENOUS; SUBCUTANEOUS at 22:35

## 2022-08-23 RX ADMIN — SODIUM CHLORIDE 40 MG: 9 INJECTION INTRAMUSCULAR; INTRAVENOUS; SUBCUTANEOUS at 05:07

## 2022-08-23 RX ADMIN — NICOTINE POLACRILEX 2 MG: 2 LOZENGE ORAL at 18:57

## 2022-08-23 RX ADMIN — HYDROMORPHONE HYDROCHLORIDE 1 MG: 1 INJECTION, SOLUTION INTRAMUSCULAR; INTRAVENOUS; SUBCUTANEOUS at 05:07

## 2022-08-23 RX ADMIN — CHLORDIAZEPOXIDE HYDROCHLORIDE 5 MG: 5 CAPSULE ORAL at 17:47

## 2022-08-23 RX ADMIN — POTASSIUM CHLORIDE 40 MEQ: 1500 TABLET, EXTENDED RELEASE ORAL at 15:38

## 2022-08-23 RX ADMIN — HEPARIN SODIUM 2000 UNITS: 1000 INJECTION INTRAVENOUS; SUBCUTANEOUS at 15:50

## 2022-08-23 RX ADMIN — POLYETHYLENE GLYCOL 3350, SODIUM SULFATE ANHYDROUS, SODIUM BICARBONATE, SODIUM CHLORIDE, POTASSIUM CHLORIDE 4000 ML: 236; 22.74; 6.74; 5.86; 2.97 POWDER, FOR SOLUTION ORAL at 15:38

## 2022-08-23 RX ADMIN — MAGNESIUM SULFATE HEPTAHYDRATE 2000 MG: 40 INJECTION, SOLUTION INTRAVENOUS at 15:40

## 2022-08-23 RX ADMIN — Medication 10 ML: at 21:19

## 2022-08-23 RX ADMIN — Medication 10 ML: at 09:04

## 2022-08-23 RX ADMIN — HEPARIN SODIUM 8.8 UNITS/KG/HR: 10000 INJECTION, SOLUTION INTRAVENOUS at 02:04

## 2022-08-23 RX ADMIN — TRAZODONE HYDROCHLORIDE 50 MG: 50 TABLET ORAL at 22:35

## 2022-08-23 ASSESSMENT — PAIN DESCRIPTION - LOCATION
LOCATION: FLANK;ABDOMEN
LOCATION: ABDOMEN;FLANK
LOCATION: ABDOMEN
LOCATION: ABDOMEN

## 2022-08-23 ASSESSMENT — PAIN DESCRIPTION - ORIENTATION
ORIENTATION: RIGHT;UPPER
ORIENTATION: RIGHT
ORIENTATION: RIGHT

## 2022-08-23 ASSESSMENT — PAIN SCALES - GENERAL
PAINLEVEL_OUTOF10: 0
PAINLEVEL_OUTOF10: 8
PAINLEVEL_OUTOF10: 7
PAINLEVEL_OUTOF10: 10

## 2022-08-23 ASSESSMENT — PAIN DESCRIPTION - DESCRIPTORS: DESCRIPTORS: DISCOMFORT

## 2022-08-23 ASSESSMENT — PAIN - FUNCTIONAL ASSESSMENT: PAIN_FUNCTIONAL_ASSESSMENT: NONE - DENIES PAIN

## 2022-08-23 NOTE — PROGRESS NOTES
Consult to Dr. Paulette Aguirre through [address] [place] [address] for cardiology consult completed.

## 2022-08-23 NOTE — PROGRESS NOTES
GENERAL SURGERY  DAILY PROGRESS NOTE  8/23/2022    Subjective:  Persistent right sided abdominal pain. Mild nausea. Warfarin reversed. Heparin gtt initiated. Patient states that his mechanical mitral and tricuspid valves are present due to rheumatic heart disease. Patient reports 2 months of abdominal pain. He has been drinking four 4 Lokos daily for the past month. He is now saying that he had one episode of melena about one week ago. Objective:  BP (!) 146/112   Pulse 91   Temp 97.8 °F (36.6 °C) (Oral)   Resp 20   Ht 5' 9\" (1.753 m)   Wt 249 lb 1.9 oz (113 kg)   SpO2 95%   BMI 36.79 kg/m²     General Appearance:  awake, alert, oriented, moderately uncomfortable  Skin:  Skin color, texture, turgor normal  Head/face:  NCAT  Eyes:  No gross abnormalities. Sclera nonicteric  Lungs/Chest:  Normal expansion. No respiratory distress. On room air  Heart: Warm throughout. Regular rate   Abdomen:  Soft, moderate right sided tenderness, positive Nelson sign, obese. Extremities: Extremities warm to touch, pink      I have personally reviewed all relevant labs and imaging.   No anemia  No leukocytosis  Bilirubin normal    Assessment/Plan:  65 y.o. male with abdominal pain and melena, supratherapeutic INR with concern for GI bleed and possible cholecystitis     PLAN:  - Trend H/H  - Plan for EGD/colonoscopy 8/24  - Clear liquid diet and bowel prep today, NPO at midnight  - antibiotics  - appreciate cardiology input    Electronically signed by Bradley Aly DO on 8/23/2022 at 12:20 PM

## 2022-08-23 NOTE — CONSULTS
Inpatient [facility] Cardiology Consultation      Reason for Consult: Elevated troponin, cardiac optimization prior to EGD/colonoscopy    Consulting Physician: Dr. Grady Dykes    Requesting Physician: Dr. Mila Chen    Date of Consultation: 8/23/2022    HISTORY OF PRESENT ILLNESS:   Patient is a 65year old WM known to Dr. Eric Tomas. He is being seen in consultation this hospital admission by Dr. Grady Dykes for evaluation and recommendations regarding elevated troponin, and cardiac optimization prior to EGD/colonoscopy. PMH: current tobacco smoker (35 pack years), HTN, HLD, Testicular CA (s/p chemo and radiation), CVA (2015), CAD / VHD s/p CABG (LIMA-LAD) and AVR (#23 St Jose's bileaflet mechanical) and MVR (#29 St Jose bileaflet mechanical valve) on chronic coumadin therapy (goal INR 2.5-3.5), s/p Dual Chamber PPM (inserted at [facility] 9/2020), RBBB/LAFB    Patient presents to [facility] on August 22, 2022 with complaints of right sided flank pain / RUQ pain. Per the patient, beginning in July 2022, he has been noting of constant right flank / right sided abdominal pain. The pain has been constant since onset, but has progressively worsened over the past week. It is sharp in nature, tender to palpation, and worsening on deep inspiration (causing difficulty with breathing at times). Has been attempting to dull the pain with ETOH - admits to drinking 3-5 \"Four Locos\" per day (malt liquor)  Additionally admits to nausea, diarrhea and intermittent episodes of dark stools. Denies CP, PATEL, emesis, diaphoresis, dizziness, palpitations, near syncope or syncope. Denies PND, orthopnea or significant peripheral edema. States he has been compliant with his Coumadin therapy, but has stopped taking all other medications for several weeks now due to \"loss of interest\". Please note: past medical records were reviewed per electronic medical record (EMR) - see detailed reports under Past Medical/ Surgical History.    PAST MEDICAL HISTORY:    Current tobacco use: 35 pack year smoker  ETOH abuse   HTN  HLD, on statin therapy   1988 Testicular carcinoma  S/p chemotherapy and surgery ([facility])  2015 CVA (with left-sided residual weakness)  2015 Stent \"L neck\" after CVA [facility]  9/10/2020 [facility]: Severe MR with restricted A1, A2, P2 scallops. 2 regurgiant jets. Mild functional MS. Mildly elevated gradient most likely due to mR. Valve arewa 4.1 cm. Moderate to severe AI. NORA 1.5 cm. Small, highly mobile artheroma is seen in proximal descending thoracic aorta. EF 55-60%. 9/11/2020 Bilateral carotid Dopplers: 1-39% bilateral carotid stenosis  9/11/2020 [facility] JOSE LAD mid vessel 95% stenosis. LCx posterolateral extension 70% stenosis  9/14/2020 @  LIMA-LAD, AVR (#23 St Jose's bileaflet mechanical) and MVR (#29 St Jose bileaflet mechanical valve)  OAC with coumadin INR 2.5-3.5  9/17/2020 Post-op TTE: EF 55-60%. Abnormal septal motion consistent with postoperative status. DD. Bileaflet mechanical AV present  9/18/2020 Developed CHB post-op EP was consulted  9/22/2020 Medtronic Dual Chamber PPM inserted ().  9/26/2020 Discharge from [facility] on ASA 81 mg QD, coumadin, Lipitor 80 mg QD, lopressor 25 mg BID,   11/5/2020 TTE : EF 55-60%. Abnormal septal motion consistent with post-operative status. #29 St Jose bileaflet mechanical valve. MV gradient 5.4 mmHg and trival prosthetic MR. Mildly elevated RVSP (35.3 mmHg). Mild to moderate TR. #23 St Ojse's bileaflet mechanical aortic valve with gradients 21.9/12.3 mmHg and DI of 0.53 and trivial AI. When compared to previous TTE AV gradients are similar. No MV gradients were obtained on previous TTE. Hospital admission March 24, 2022 with chest pain and elevated troponin, no acute coronary syndrome and patient was in FRANTZ, troponins 61-54-40  Medication non-compliance (Coumadin)  Chronic RBBB/LAFB  ? CKD.   Recent Cr Baseline 1.4 - 1.7  Admission [facility] on 5/11/2022 with complaints of right leg pain / right hip pain / left sided chest pain that reportedly was traveling to his left shoulder, associated with shortness of breath. Was noted to have retroperitoneal hemorrhage in the setting of a supratherapeutic INR (>10). Was also noted to be hypokalemic. Was started on LR at 76 cc/h continuous. Cardiology was consulted for spontaneous retroperitoneal hematoma in the setting of a supratherapeutic INR (not actively bleeding at that time). Plans to reverse INR with IV vitamin K and order Kcentra. +Hemoccult stool. Cardiology recommendations to resume Coumadin when INR improves (Goal INR 2.5-3.5). ---> Cardiology signed off. Patient was given 4U FFP. Coumadin was resumed on 5/14/2022. INR 1.5 (5/18/2022). Patient was discharged on 5/20/2022 with Lovenox bridging to coumadin. Patient was discharged to home with nurse. Re-Admission [facility] 5/28/2022 for chest pain. Thought to be atypical.  Chronically elevated troponin. Underwent Lexiscan stress test, discharged home in stable condition. Lexiscan Stress Test 5/2022: Normal MPI with attenuation artifact. LVEF 54% with normal wall motion. PAST SURGICAL HISTORY:    Past Surgical History:   Procedure Laterality Date    FRACTURE SURGERY      Left Tibial Plateau Fracture 1/28/2300     PACEMAKER PLACEMENT  09/2020       HOME MEDICATIONS:  Prior to Admission medications    Medication Sig Start Date End Date Taking?  Authorizing Provider   warfarin (COUMADIN) 5 MG tablet Take 5 mg by mouth Twice a Week 2.5 mg Monday-Friday  5 mg on Saturday and Sunday   Yes Historical Provider, MD   warfarin (COUMADIN) 2.5 MG tablet Take 1 tablet by mouth daily  Patient taking differently: Take 2.5 mg by mouth Five times weekly 2.5 mg Monday-Friday  5 mg on Saturday and Sunday 5/18/22   Aydee Kelly MD       CURRENT MEDICATIONS:      Current Facility-Administered Medications:     sodium chloride flush 0.9 % injection 5-40 mL, 5-40 mL, IntraVENous, 2 times per day, Keely Lindo MD, 10 mL at 08/23/22 0904    sodium chloride flush 0.9 % injection 5-40 mL, 5-40 mL, IntraVENous, PRN, Marci Miller MD    0.9 % sodium chloride infusion, , IntraVENous, PRN, Marci Miller MD    ondansetron (ZOFRAN-ODT) disintegrating tablet 4 mg, 4 mg, Oral, Q8H PRN, 4 mg at 08/23/22 0911 **OR** ondansetron (ZOFRAN) injection 4 mg, 4 mg, IntraVENous, Q6H PRN, Marci Miller MD    polyethylene glycol (GLYCOLAX) packet 17 g, 17 g, Oral, Daily PRN, Marci Miller MD    acetaminophen (TYLENOL) tablet 650 mg, 650 mg, Oral, Q6H PRN **OR** acetaminophen (TYLENOL) suppository 650 mg, 650 mg, Rectal, Q6H PRN, Marci Miller MD    heparin (porcine) injection 4,000 Units, 4,000 Units, IntraVENous, PRN, Patrick Mims MD    heparin (porcine) injection 2,000 Units, 2,000 Units, IntraVENous, PRN, Patrick Mims MD, 2,000 Units at 08/23/22 1550    heparin 25,000 units in dextrose 5% 250 mL (premix) infusion, 5-30 Units/kg/hr, IntraVENous, Continuous, Patrick Mims MD, Last Rate: 9.9 mL/hr at 08/23/22 1551, 8.8 Units/kg/hr at 08/23/22 1551    nicotine polacrilex (COMMIT) lozenge 2 mg, 2 mg, Oral, Q2H PRN, Hector Bell MD, 2 mg at 08/23/22 1538    magnesium sulfate 2000 mg in 50 mL IVPB premix, 2,000 mg, IntraVENous, Once, Jose G Ndiaye MD, Last Rate: 25 mL/hr at 08/23/22 1540, 2,000 mg at 08/23/22 1540    0.9 % sodium chloride infusion, , IntraVENous, PRN, Zeke Flor MD    piperacillin-tazobactam (ZOSYN) 3,375 mg in dextrose 5 % 100 mL IVPB extended infusion (mini-bag), 3,375 mg, IntraVENous, Q8H, Zeke Flor MD, Stopped at 08/23/22 1344    pantoprazole (PROTONIX) 40 mg in sodium chloride (PF) 10 mL injection, 40 mg, IntraVENous, Q12H, Zeke Flor MD, 40 mg at 08/23/22 0507    chlordiazePOXIDE (LIBRIUM) capsule 5 mg, 5 mg, Oral, 4x Daily, Marci Miller MD, 5 mg at 08/23/22 1217    oxyCODONE-acetaminophen (PERCOCET) 5-325 MG per tablet 1 tablet, 1 tablet, Oral, Q4H PRN, Marci Miller MD, 1 tablet at 22 0159    HYDROmorphone (DILAUDID) injection 1 mg, 1 mg, IntraVENous, Q3H PRN, Melissa Jarrett, APRN - CNP, 1 mg at 22 1538    traZODone (DESYREL) tablet 50 mg, 50 mg, Oral, Nightly PRN, Melissa Jarrett APRN - CNP, 50 mg at 22 2330      ALLERGIES:  Patient has no known allergies. SOCIAL HISTORY:    Current tobacco smoker, 1 pack/day for 20+ years  ETOH:  Admits to 3-4 \"four locos\" per day  Illicit Drugs: Denies  Activity: Live with friends/girlfriend in 2 story home (planning on getting apartment with finance). Do not have a car. Disability for heart surgery. NOK daughter Jose Ackerman (VWOOFDH). No assistive devices  Code Status: Full Code      FAMILY HISTORY:   Mother  age 70 MI. No reported PCI/CABG. No reported PPM/ICD. No DM  Biological Father unknown arias history  No full siblings      REVIEW OF SYSTEMS:     Negative except as noted above in HPI      PHYSICAL EXAM:   /79   Pulse 68   Temp 98.2 °F (36.8 °C) (Oral)   Resp 18   Ht 5' 9\" (1.753 m)   Wt 249 lb 1.9 oz (113 kg)   SpO2 96%   BMI 36.79 kg/m²   CONST:  Well developed, obese WM who appears stated age. Awake, alert, cooperative, no apparent distress. HEENT:   Head- Normocephalic, atraumatic. Eyes- Conjunctivae pink, anicteric. Neck-  No stridor, trachea midline, no apparent jugular venous distention. CHEST: Chest symmetrical. No accessory muscle use or intercostal retractions. RESPIRATORY: Lung sounds - diminished bilaterally  CARDIOVASCULAR:     No noted carotid bruit. Heart Ausculation- Regular rate and rhythm, +mechanical valve clicks  PV: No lower extremity edema. Pedal pulses palpable, no clubbing or cyanosis. ABDOMEN: Soft, +tender RUQ/RLQ/right flank pain. Bowel sounds present. MS: Good muscle strength and tone. No atrophy or abnormal movements. SKIN: Warm and dry. NEURO / PSYCH: Oriented to person, place and time. Speech clear and appropriate. Follows all commands.  Pleasant affect. DATA:    Telemetry: SR with HR in the 80-90s    Diagnostic:  All diagnostic testing and lab work thus far this admission reviewed in detail. CT Abdomen / Pelvis 8/22/2022  Impression  Left nephrolithiasis. No ureteral calculus or obstructive uropathy. Dilated gallbladder and cholelithiasis. The liver demonstrates mild diffuse decreased attenuation consistent with fatty metamorphosis. Atherosclerotic disease is noted. Other nonacute findings as described. CXR 8/22/2022  Impression  Mild bibasilar subsegmental atelectasis. Abdomen US 8/22/2022  Impression  1. Suboptimal study, as described above. 2.  Fatty liver again noted, when compared to CT scan of the abdomen and  pelvis performed earlier in the day. 3.  Suggestion of cholelithiasis. Cholelithiasis was noted on the prior CT.   4.  Right renal cortical cyst.      Intake/Output Summary (Last 24 hours) at 8/23/2022 1734  Last data filed at 8/23/2022 1441  Gross per 24 hour   Intake 1950 ml   Output 225 ml   Net 1725 ml       Labs:   CBC:   Recent Labs     08/22/22  1122 08/23/22  0759   WBC 5.6 4.6   HGB 15.0 14.1   HCT 44.2 42.6    110*     BMP:   Recent Labs     08/22/22  1131 08/23/22  0759    138   K 3.9 3.5   CO2 18* 21*   BUN 12 13   CREATININE 1.4* 1.7*   LABGLOM 52 42   CALCIUM 8.9 8.5*     HgA1c:   Lab Results   Component Value Date    LABA1C 5.2 03/24/2022     PT/INR:   Recent Labs     08/22/22  1122 08/23/22  0000   PROTIME >120.0* 14.7*  14.4*   INR >10.0* 1.3  1.3     APTT:  Recent Labs     08/23/22  0721 08/23/22  1432   APTT 84.7* 35.3*     FASTING LIPID PANEL:  Lab Results   Component Value Date/Time    CHOL 86 03/24/2022 05:31 AM    HDL 28 03/24/2022 05:31 AM    LDLCALC 28 03/24/2022 05:31 AM    TRIG 149 03/24/2022 05:31 AM     LIVER PROFILE:  Recent Labs     08/22/22  1131   AST 96*   ALT 63*   LABALBU 4.1      Ref Range & Units 08/22/22 1342 08/22/22 1131   Troponin, High Sensitivity 0 - 11 ng/L 71 High 73 High  CM       Ref Range & Units 08/22/22 1131   Pro-BNP 0 - 125 pg/mL 373 High        Ref Range & Units 08/22/22 1134   Ammonia 16.0 - 60.0 umol/L 48.0      Component Ref Range & Units 08/22/22 1122   Lactic Acid 0.5 - 2.2 mmol/L 2.0          Ref Range & Units 08/22/22 1131   Lipase 13 - 60 U/L 27       Ref Range & Units 08/22/22 1131   Ethanol Lvl mg/dL <10    Comment: Not Detected   Acetaminophen Level 10.0 - 30.0 mcg/mL <6.8 Low     Salicylate, Serum 0.0 - 30.0 mg/dL <0.3    TCA Scrn Cutoff:300 ng/mL NEGATIVE       Ref Range & Units 08/22/22 1131   Amphetamine Screen, Urine Negative <1000 ng/mL NOT DETECTED    Barbiturate Screen, Ur Negative < 200 ng/mL NOT DETECTED    Benzodiazepine Screen, Urine Negative < 200 ng/mL NOT DETECTED    Cannabinoid Scrn, Ur Negative < 50ng/mL NOT DETECTED    Cocaine Metabolite Screen, Urine Negative < 300 ng/mL NOT DETECTED    Opiate Scrn, Ur Negative < 300ng/mL NOT DETECTED    Comment: Note:  The Opiate Screen is not intended to detect Oxycodone.    PCP Screen, Urine Negative < 25 ng/mL NOT DETECTED    Methadone Screen, Urine Negative <300 ng/mL NOT DETECTED    Oxycodone Urine Negative <100 ng/mL NOT DETECTED    FENTANYL SCREEN, URINE Negative <1 ng/mL NOT DETECTED     Component Ref Range & Units 08/22/22 1433   OCCULT BLOOD FECAL  positive         08/22/22 1131     Color, UA Straw/Yellow Yellow    Clarity, UA Clear Clear    Glucose, Ur Negative mg/dL Negative    Bilirubin Urine Negative Negative    Ketones, Urine Negative mg/dL Negative    Specific Gravity, UA 1.005 - 1.030 1.015    Blood, Urine Negative SMALL Abnormal     pH, UA 5.0 - 9.0 6.5    Protein, UA Negative mg/dL 100 Abnormal     Urobilinogen, Urine <2.0 E.U./dL 0.2    Nitrite, Urine Negative Negative    Leukocyte Esterase, Urine Negative Negative      URINE CULTURE PENDING      ASSESSMENT/RECOMMENDATIONS:  Chronically elevated troponin  hS-troponin 73 --> 71 (60s May 2022 admission), flat pattern not consistent with ACS  No CP, no acute ST changes on EKG  Cardiac risk stratification prior to EGD/colonoscopy   Right flank pain/diarrhea with dark stools, +FOBT in ED  Elevated LFTs  CAD s/p CABG x1 with a LIMA to the LAD in 2020. Non-ischemic Lexiscan 5/2022  Supratherapeutic INR > 10, requiring reversal in ED this hospital presentation  Recent retroperitoneal hematoma in the setting of INR > 10, 5/2022 requiring reversal.  Coumadin held until 5/18/2022 --> bridged with Lovenox outpatient. VHD s/p mechanical AVR and MVR in 2020 and on chronic Coumadin therapy, with a goal INR of 3-3.5  cRBBB/LAFB  CHB s/p Medtronic PPM 2020  HTN, uncontrolled  HLD, on statin therapy   Obesity  Ongoing tobacco abuse  Alcohol abuse  Limited ROM of L arm/shoulder secondary to humerus fracture  Probable LEONOR   Medical non-compliance       Continue Heparin drip to bridge Coumadin until INR > 2.5 (goal 2.5 to 3.5)  Hold Crestor at this time due to elevated LFTs -- can resume once improved/normalized  Resume other home cardiac medications   Follow up INR management outpatient with PCP  Recommend outpatient sleep study   Patient is at acceptable cardiac risk for planned EGD/colonoscopy. RCRI Class II risk, with 6.0% 30-Day risk of death, MI or cardiac arrest  Rest as per primary service and other consultants          The above case and recommendations to be discussed with Dr. Kathy Witt. Further recommendations to follow as per him       NOTE: This report was transcribed using voice recognition software. Every effort was made to ensure accuracy; however, inadvertent computerized transcription errors may be present. [place], [address] [place] [address], [place] [id number] Cardiology    Electronically signed by Jie Mckeon MD on 8/23/2022 at 5:34 PM      Addendum:                                                            Inpatient CARDIOLOGY Consultation       Our CORTES exam, assessment reviewed and reflect my work.    I personally saw, examined, and evaluated the patient today. I spent more than 50% of total consult time today. I am the primary author for the consult notes. I personally reviewed the medications, rhythm strips, pertinent labs and test reports. I directly participated in the medical-decision making, ordering pertinent tests and medication adjustment. Reason for Consult:  Preop cardiac clearance     Date of Consultation: 8/23/2022     Patient previously known to Dr. Janiya Christina: 65year old with history of VHD - s/p Mechanical valves - AVR (#23 St Jose's bileaflet mechanical) and MVR (#29 St Jose bileaflet mechanical valve) on chronic coumadin therapy (goal INR 2.5-3.5),  CAD s/p CABG,  s/p Dual Chamber PPM (inserted at [facility] 9/2020), RBBB/LAFB, HTN, HLD, Testicular CA (s/p chemo and radiation), CVA (2015) presented [facility] on August 22, 2022 with complaints of right sided flank pain / RUQ pain. Admits to diarrhea, dark stools  Non-compliant with Coumadin and testing; Drinking ETOH to \"help with the pain\"     +FOBT in ED; Supra therapeutic INR > 10 in ED, reversal  EGD/colonoscopy planned for tomorrow 8/24        REVIEW OF SYSTEMS:   Review of rest of 12 systems negative except as mentioned in HPI. Please note: past medical records were reviewed per electronic medical record (EMR) - see detailed reports under Past Medical/ Surgical History.          Past Medical History:    Past Medical History        Past Medical History:   Diagnosis Date    H/O prosthetic aortic valve replacement 09/2020     [facility] - main  Mitral and pacemaker at this time as well    History of mitral valve replacement 09/2020     at [facility] - main  Aortic Valve placed at this time as well as pacemaker            Past Surgical History:    Past Surgical History         Past Surgical History:   Procedure Laterality Date    FRACTURE SURGERY         Left Tibial Plateau Fracture 8/14/9215     PACEMAKER PLACEMENT   09/2020               Allergies: Patient has no known allergies. Social History:    Social History               Socioeconomic History    Marital status:        Spouse name: Not on file    Number of children: Not on file    Years of education: Not on file    Highest education level: Not on file   Occupational History    Not on file   Tobacco Use    Smoking status: Every Day       Packs/day: 1.00       Types: Cigarettes    Smokeless tobacco: Never   Substance and Sexual Activity    Alcohol use: Yes       Comment: occassional    Drug use: Never    Sexual activity: Not on file   Other Topics Concern    Not on file   Social History Narrative    Not on file      Social Determinants of Health      Financial Resource Strain: Not on file   Food Insecurity: Not on file   Transportation Needs: Not on file   Physical Activity: Not on file   Stress: Not on file   Social Connections: Not on file   Intimate Partner Violence: Not on file   Housing Stability: Not on file            Family History:   Family History   History reviewed. No pertinent family history. Medications Prior to admit: Reviewed  Home Medications           Prior to Admission medications    Medication Sig Start Date End Date Taking?  Authorizing Provider   warfarin (COUMADIN) 5 MG tablet Take 5 mg by mouth Twice a Week 2.5 mg Monday-Friday  5 mg on Saturday and Sunday     Yes Historical Provider, MD   warfarin (COUMADIN) 2.5 MG tablet Take 1 tablet by mouth daily  Patient taking differently: Take 2.5 mg by mouth Five times weekly 2.5 mg Monday-Friday  5 mg on Saturday and Sunday 5/18/22     Dearl MD Patricia               DATA:       ECG - Reviewed      Tele strips: Reviewed     Diagnostic:        Intake/Output Summary (Last 24 hours) at 8/23/2022 1008  Last data filed at 8/23/2022 0545      Gross per 24 hour   Intake 490 ml   Output 100 ml   Net 390 ml         IMAGING STUDIES: Reviewed        LABS:       Cardiac enzymes:No results for input(s): CKTOTAL, CKMB, CKMBINDEX in the last 72 hours. Invalid input(s): TROPONIN  CBC:        Recent Labs     08/22/22  1122 08/23/22  0759   WBC 5.6 4.6   HGB 15.0 14.1   HCT 44.2 42.6    110*      BMP:        Recent Labs     08/22/22  1131 08/23/22  0759    138   K 3.9 3.5   CO2 18* 21*   BUN 12 13   CREATININE 1.4* 1.7*   LABGLOM 52 42   CALCIUM 8.9 8.5*      Mag:       Recent Labs     08/23/22  0759   MG 1.5*      Phos: No results for input(s): PHOS in the last 72 hours. TSH: No results for input(s): TSH in the last 72 hours. HgA1c:         Lab Results   Component Value Date     LABA1C 5.2 03/24/2022      No results found for: EAG  proBNP:       Recent Labs     08/22/22  1131   PROBNP 373*      PT/INR:        Recent Labs     08/22/22  1122 08/23/22  0000   PROTIME >120.0* 14.7*  14.4*   INR >10.0* 1.3  1.3      APTT:       Recent Labs     08/22/22  1122 08/23/22  0721   APTT 77.2* 84.7*      FASTING LIPID PANEL:        Lab Results   Component Value Date/Time     CHOL 86 03/24/2022 05:31 AM     HDL 28 03/24/2022 05:31 AM     LDLCALC 28 03/24/2022 05:31 AM     TRIG 149 03/24/2022 05:31 AM      LIVER PROFILE:      Recent Labs     08/22/22  1131   AST 96*   ALT 63*   LABALBU 4.1         Current Inpatient Medications:  Scheduled Medications    sodium chloride flush  5-40 mL IntraVENous 2 times per day    polyethylene glycol  4,000 mL Oral Once    piperacillin-tazobactam  3,375 mg IntraVENous Q8H    pantoprazole (PROTONIX) 40 mg injection  40 mg IntraVENous Q12H    chlordiazePOXIDE  5 mg Oral 4x Daily            IV Infusions (if any): Infusions Meds    sodium chloride      heparin (PORCINE) Infusion 6.8 Units/kg/hr (08/23/22 0904)    sodium chloride              PHYSICAL EXAM:      /79   Pulse 68   Temp 97.8 °F (36.6 °C) (Oral)   Resp 18   Ht 5' 9\" (1.753 m)   Wt 249 lb 1.9 oz (113 kg)   SpO2 95%   BMI 36.79 kg/m²      CONST:  Well developed, appears stated age. Awake, alert and no apparent distress. HEENT:   Head- Normocephalic  Eyes- Conjunctivae pink, no icterus  Throat- Oral mucosa moist  Neck-  No stridor, no jugular venous distention. No carotid bruit. CHEST: Chest symmetrical and non-tender to palpation. No accessory muscle use  RESPIRATORY: Lung sounds - Few rhonchi  CARDIOVASCULAR:     Heart Inspection-pacer site OK  Heart Palpation- No thrills. Heart Ausculation- Regular rate and rhythm, Mechanical S1 and S2 sounds; 2/6 systolic murmur. No s3 or rub   EXT: No lower extremity edema. Distal pulses palpable, no cyanosis   ABDOMEN: Soft, non-tender to light palpation. Obese, Bowel sounds present. No abdominal bruit  MS: Good muscle strength    : Deferred  RECTAL: Deferred  SKIN: Warm and dry   NEURO / PSYCH: Oriented to person, place; Good mood and affect. IMPRESSION / RECOMMENDATIONS:     Acute cholecystitis     Preoperative cardiac clearance - Cleared from cardiac stand point     Elevated Troponin  - Flat pattern, No CP, chronic elevation. EKG reviewed     Mechanical Heart valves - AVR (#23 St Jose's bileaflet mechanical) and MVR (#29 St Jose bileaflet mechanical valve) 9/2020. Coumadin held; On Heparin - After his surgical procedure resume Coumadin and continue Heparin till INR >2.5     CAD s/p CABG x1 with a LIMA to the LAD in 2020. Non-ischemic Lexiscan 5/2022  - Start BB; and alter Statin and ASA.      FRANTZ - Monitor renal Fn    Alcohol use - Counseled to quit drinking     S/p Pacemaker for post-op CHB - Medtronic      HTN - Monitor BP      Noncompliant with meds and testing - Compliance with meds, diet, testing discussed           Above recommendations discussed with him      Electronically signed by Dennis Hernández MD on 8/23/2022 at 10:08 AM  [facility]) Cardiology

## 2022-08-23 NOTE — PLAN OF CARE
Problem: Discharge Planning  Goal: Discharge to home or other facility with appropriate resources  8/23/2022 1104 by Nadeem Fry RN  Outcome: Progressing  8/23/2022 0055 by Thor Obando RN  Outcome: Progressing     Problem: Pain  Goal: Verbalizes/displays adequate comfort level or baseline comfort level  8/23/2022 1104 by Nadeem Fry RN  Outcome: Progressing  8/23/2022 0055 by Thor Obando RN  Outcome: Progressing     Problem: Safety - Adult  Goal: Free from fall injury  Outcome: Progressing     Problem: ABCDS Injury Assessment  Goal: Absence of physical injury  Outcome: Progressing

## 2022-08-23 NOTE — PROGRESS NOTES
Hospitalist Progress Note      Synopsis: Patient admitted on 8/22/2022     Subjective    Patient seen and examined at bedside today. Complaining of right upper quadrant pain upon lying about that. No signs of withdrawal.  States that he drinks 4 to 5 cans of forloko at home due to pain to help him sleep. Exam:  BP (!) 146/112   Pulse 91   Temp 97.8 °F (36.6 °C) (Oral)   Resp 20   Ht 5' 9\" (1.753 m)   Wt 249 lb 1.9 oz (113 kg)   SpO2 95%   BMI 36.79 kg/m²   General appearance: No apparent distress, appears stated age and cooperative. HEENT: Pupils equal, round, and reactive to light. Conjunctivae/corneas clear. Neck: Supple. No jugular venous distention. Respiratory:  Normal respiratory effort. Clear to auscultation, bilaterally without Rales/Wheezes/Rhonchi. Cardiovascular: Regular rate and rhythm with normal S1/S2 without murmurs, rubs or gallops. Abdomen: Soft, tenderness  Musculoskeletal: No clubbing, cyanosis or edema bilaterally.    Skin:  No rashes    Neurologic: awake, alert and following commands     Medications:  Reviewed    Infusion Medications    sodium chloride      heparin (PORCINE) Infusion 6.8 Units/kg/hr (08/23/22 0904)    sodium chloride       Scheduled Medications    sodium chloride flush  5-40 mL IntraVENous 2 times per day    potassium chloride  40 mEq Oral Once    magnesium sulfate  2,000 mg IntraVENous Once    polyethylene glycol  4,000 mL Oral Once    piperacillin-tazobactam  3,375 mg IntraVENous Q8H    pantoprazole (PROTONIX) 40 mg injection  40 mg IntraVENous Q12H    chlordiazePOXIDE  5 mg Oral 4x Daily     PRN Meds: sodium chloride flush, sodium chloride, ondansetron **OR** ondansetron, polyethylene glycol, acetaminophen **OR** acetaminophen, heparin (porcine), heparin (porcine), nicotine polacrilex, sodium chloride, oxyCODONE-acetaminophen, HYDROmorphone, traZODone    I/O    Intake/Output Summary (Last 24 hours) at 8/23/2022 1521  Last data filed at 8/23/2022 1441  Gross per 24 hour   Intake 1950 ml   Output 225 ml   Net 1725 ml       Labs:   Recent Labs     08/22/22  1122 08/23/22  0759   WBC 5.6 4.6   HGB 15.0 14.1   HCT 44.2 42.6    110*       Recent Labs     08/22/22  1131 08/23/22  0759    138   K 3.9 3.5    103   CO2 18* 21*   BUN 12 13   CREATININE 1.4* 1.7*   CALCIUM 8.9 8.5*       Recent Labs     08/22/22  1131   PROT 7.2   ALKPHOS 147*   ALT 63*   AST 96*   BILITOT 0.5   LIPASE 27       Recent Labs     08/22/22  1122 08/23/22  0000   INR >10.0* 1.3  1.3       No results for input(s): [place] in the last 72 hours. Chronic labs:  Lab Results   Component Value Date    CHOL 86 03/24/2022    TRIG 149 03/24/2022    HDL 28 03/24/2022    LDLCALC 28 03/24/2022    TSH 1.990 05/12/2022    INR 1.3 08/23/2022    INR 1.3 08/23/2022    LABA1C 5.2 03/24/2022       Radiology:  Imaging studies reviewed today. ASSESSMENT:    Principal Problem:    Acute cholecystitis    Problem list  Pacemaker in situ  History of aortic and mitral valve replacements initially on Coumadin  Alcohol abuse  Supratherapeutic INR  History of retroperitoneal hemorrhage  CKD  Essential hypertension  Hyperlipidemia     PLAN:  Continue inpatient admission. Continue heparin for now. Cardiology recommendations noted. On clear liquid diet for EGD colonoscopy tomorrow. Surgery on board. Continue IV antibiotics  Monitor electrolytes and correct as needed. Continue Librium for now. Diet: Diet NPO Exceptions are: Sips of Water with Meds  ADULT DIET; Clear Liquid  Code Status: Full Code  PT/OT Eval Status:    N/A  DVT Prophylaxis:   On heparin gtt. Recommended disposition at discharge: Likely home    +++++++++++++++++++++++++++++++++++++++++++++++++  Eliezer Block MD   [facility].  +++++++++++++++++++++++++++++++++++++++++++++++++  NOTE: This report was transcribed using voice recognition software.  Every effort was made to ensure accuracy; however, inadvertent computerized transcription errors may be present.

## 2022-08-23 NOTE — CARE COORDINATION
Patient presented to Ed with c/o r Flank pain x 1 month and diarrhea/melena. PT/INR >120/>10. Vit K given in ED. Started. on Librium  IV Protonix, Iv Zosyn, and heparin infusion. . US abdomen Suggestion of cholelithiasis. Surgery consulted. Clear liquid diet. For EGD/Colonoscopy 8/24. Met with patient at bedside to discuss transition of care. Patient receiving IV Dilaudid for continued c/o pain. Patient lives with his girlfriend in a 2 story home with bedroom on second level. He was independent PTA. He has no PCP and uses [facility] on [address] in [place]. At this time no discharge needs anticipated. His girlfriend will transport to home when medically cleared. CM/SW will continue to follow.

## 2022-08-24 ENCOUNTER — ANESTHESIA EVENT (OUTPATIENT)
Dept: ENDOSCOPY | Age: 57
DRG: 241 | End: 2022-08-24
Payer: COMMERCIAL

## 2022-08-24 ENCOUNTER — ANESTHESIA (OUTPATIENT)
Dept: ENDOSCOPY | Age: 57
DRG: 241 | End: 2022-08-24
Payer: COMMERCIAL

## 2022-08-24 PROBLEM — K63.5 POLYP OF COLON: Status: ACTIVE | Noted: 2022-08-24

## 2022-08-24 LAB
ALBUMIN SERPL-MCNC: 4 G/DL (ref 3.5–5.2)
ALP BLD-CCNC: 128 U/L (ref 40–129)
ALT SERPL-CCNC: 43 U/L (ref 0–40)
ANION GAP SERPL CALCULATED.3IONS-SCNC: 15 MMOL/L (ref 7–16)
APTT: 32.9 SEC (ref 24.5–35.1)
APTT: 35.2 SEC (ref 24.5–35.1)
APTT: 47 SEC (ref 24.5–35.1)
AST SERPL-CCNC: 52 U/L (ref 0–39)
BASOPHILS ABSOLUTE: 0.04 E9/L (ref 0–0.2)
BASOPHILS RELATIVE PERCENT: 0.7 % (ref 0–2)
BILIRUB SERPL-MCNC: 1 MG/DL (ref 0–1.2)
BILIRUBIN DIRECT: 0.3 MG/DL (ref 0–0.3)
BILIRUBIN, INDIRECT: 0.7 MG/DL (ref 0–1)
BUN BLDV-MCNC: 12 MG/DL (ref 6–20)
CALCIUM SERPL-MCNC: 8.9 MG/DL (ref 8.6–10.2)
CHLORIDE BLD-SCNC: 103 MMOL/L (ref 98–107)
CO2: 21 MMOL/L (ref 22–29)
CREAT SERPL-MCNC: 2.1 MG/DL (ref 0.7–1.2)
EOSINOPHILS ABSOLUTE: 0.27 E9/L (ref 0.05–0.5)
EOSINOPHILS RELATIVE PERCENT: 4.5 % (ref 0–6)
GFR AFRICAN AMERICAN: 40
GFR NON-AFRICAN AMERICAN: 33 ML/MIN/1.73
GLUCOSE BLD-MCNC: 96 MG/DL (ref 74–99)
HCT VFR BLD CALC: 41.1 % (ref 37–54)
HEMOGLOBIN: 13.4 G/DL (ref 12.5–16.5)
IMMATURE GRANULOCYTES #: 0.02 E9/L
IMMATURE GRANULOCYTES %: 0.3 % (ref 0–5)
INR BLD: 1.7
LYMPHOCYTES ABSOLUTE: 1.08 E9/L (ref 1.5–4)
LYMPHOCYTES RELATIVE PERCENT: 17.9 % (ref 20–42)
MAGNESIUM: 1.9 MG/DL (ref 1.6–2.6)
MCH RBC QN AUTO: 30.8 PG (ref 26–35)
MCHC RBC AUTO-ENTMCNC: 32.6 % (ref 32–34.5)
MCV RBC AUTO: 94.5 FL (ref 80–99.9)
MONOCYTES ABSOLUTE: 0.67 E9/L (ref 0.1–0.95)
MONOCYTES RELATIVE PERCENT: 11.1 % (ref 2–12)
NEUTROPHILS ABSOLUTE: 3.94 E9/L (ref 1.8–7.3)
NEUTROPHILS RELATIVE PERCENT: 65.5 % (ref 43–80)
PDW BLD-RTO: 15.3 FL (ref 11.5–15)
PLATELET # BLD: 130 E9/L (ref 130–450)
PMV BLD AUTO: 11.8 FL (ref 7–12)
POTASSIUM REFLEX MAGNESIUM: 3.4 MMOL/L (ref 3.5–5)
PROTHROMBIN TIME: 18.4 SEC (ref 9.3–12.4)
RBC # BLD: 4.35 E12/L (ref 3.8–5.8)
SODIUM BLD-SCNC: 139 MMOL/L (ref 132–146)
TOTAL PROTEIN: 7 G/DL (ref 6.4–8.3)
URINE CULTURE, ROUTINE: NORMAL
WBC # BLD: 6 E9/L (ref 4.5–11.5)

## 2022-08-24 PROCEDURE — 43235 EGD DIAGNOSTIC BRUSH WASH: CPT | Performed by: SURGERY

## 2022-08-24 PROCEDURE — 83735 ASSAY OF MAGNESIUM: CPT

## 2022-08-24 PROCEDURE — 0DBL8ZX EXCISION OF TRANSVERSE COLON, VIA NATURAL OR ARTIFICIAL OPENING ENDOSCOPIC, DIAGNOSTIC: ICD-10-PCS | Performed by: SURGERY

## 2022-08-24 PROCEDURE — 6370000000 HC RX 637 (ALT 250 FOR IP): Performed by: INTERNAL MEDICINE

## 2022-08-24 PROCEDURE — 2580000003 HC RX 258

## 2022-08-24 PROCEDURE — C9113 INJ PANTOPRAZOLE SODIUM, VIA: HCPCS

## 2022-08-24 PROCEDURE — 85025 COMPLETE CBC W/AUTO DIFF WBC: CPT

## 2022-08-24 PROCEDURE — 6360000002 HC RX W HCPCS

## 2022-08-24 PROCEDURE — 7100000000 HC PACU RECOVERY - FIRST 15 MIN: Performed by: SURGERY

## 2022-08-24 PROCEDURE — 3700000001 HC ADD 15 MINUTES (ANESTHESIA): Performed by: SURGERY

## 2022-08-24 PROCEDURE — 2580000003 HC RX 258: Performed by: INTERNAL MEDICINE

## 2022-08-24 PROCEDURE — 2709999900 HC NON-CHARGEABLE SUPPLY: Performed by: SURGERY

## 2022-08-24 PROCEDURE — 6370000000 HC RX 637 (ALT 250 FOR IP)

## 2022-08-24 PROCEDURE — 6370000000 HC RX 637 (ALT 250 FOR IP): Performed by: STUDENT IN AN ORGANIZED HEALTH CARE EDUCATION/TRAINING PROGRAM

## 2022-08-24 PROCEDURE — 2140000000 HC CCU INTERMEDIATE R&B

## 2022-08-24 PROCEDURE — 0DBM8ZX EXCISION OF DESCENDING COLON, VIA NATURAL OR ARTIFICIAL OPENING ENDOSCOPIC, DIAGNOSTIC: ICD-10-PCS | Performed by: SURGERY

## 2022-08-24 PROCEDURE — 6360000002 HC RX W HCPCS: Performed by: STUDENT IN AN ORGANIZED HEALTH CARE EDUCATION/TRAINING PROGRAM

## 2022-08-24 PROCEDURE — 88305 TISSUE EXAM BY PATHOLOGIST: CPT

## 2022-08-24 PROCEDURE — 7100000001 HC PACU RECOVERY - ADDTL 15 MIN: Performed by: SURGERY

## 2022-08-24 PROCEDURE — 80076 HEPATIC FUNCTION PANEL: CPT

## 2022-08-24 PROCEDURE — 85610 PROTHROMBIN TIME: CPT

## 2022-08-24 PROCEDURE — 3700000000 HC ANESTHESIA ATTENDED CARE: Performed by: SURGERY

## 2022-08-24 PROCEDURE — 2500000003 HC RX 250 WO HCPCS

## 2022-08-24 PROCEDURE — 2720000010 HC SURG SUPPLY STERILE: Performed by: SURGERY

## 2022-08-24 PROCEDURE — A4216 STERILE WATER/SALINE, 10 ML: HCPCS

## 2022-08-24 PROCEDURE — 3609010400 HC COLONOSCOPY POLYPECTOMY HOT BIOPSY: Performed by: SURGERY

## 2022-08-24 PROCEDURE — 3609017100 HC EGD: Performed by: SURGERY

## 2022-08-24 PROCEDURE — 45385 COLONOSCOPY W/LESION REMOVAL: CPT | Performed by: SURGERY

## 2022-08-24 PROCEDURE — 80048 BASIC METABOLIC PNL TOTAL CA: CPT

## 2022-08-24 PROCEDURE — 36415 COLL VENOUS BLD VENIPUNCTURE: CPT

## 2022-08-24 PROCEDURE — 85730 THROMBOPLASTIN TIME PARTIAL: CPT

## 2022-08-24 PROCEDURE — 0DJ08ZZ INSPECTION OF UPPER INTESTINAL TRACT, VIA NATURAL OR ARTIFICIAL OPENING ENDOSCOPIC: ICD-10-PCS | Performed by: SURGERY

## 2022-08-24 RX ORDER — PROPOFOL 10 MG/ML
INJECTION, EMULSION INTRAVENOUS PRN
Status: DISCONTINUED | OUTPATIENT
Start: 2022-08-24 | End: 2022-08-24 | Stop reason: SDUPTHER

## 2022-08-24 RX ORDER — SODIUM CHLORIDE 9 MG/ML
INJECTION, SOLUTION INTRAVENOUS CONTINUOUS PRN
Status: DISCONTINUED | OUTPATIENT
Start: 2022-08-24 | End: 2022-08-24 | Stop reason: SDUPTHER

## 2022-08-24 RX ORDER — LIDOCAINE HYDROCHLORIDE 20 MG/ML
INJECTION, SOLUTION INTRAVENOUS PRN
Status: DISCONTINUED | OUTPATIENT
Start: 2022-08-24 | End: 2022-08-24 | Stop reason: SDUPTHER

## 2022-08-24 RX ORDER — PHENYLEPHRINE HCL IN 0.9% NACL 1 MG/10 ML
SYRINGE (ML) INTRAVENOUS PRN
Status: DISCONTINUED | OUTPATIENT
Start: 2022-08-24 | End: 2022-08-24 | Stop reason: SDUPTHER

## 2022-08-24 RX ADMIN — NICOTINE POLACRILEX 2 MG: 2 LOZENGE ORAL at 19:30

## 2022-08-24 RX ADMIN — Medication 300 MCG: at 16:25

## 2022-08-24 RX ADMIN — PIPERACILLIN AND TAZOBACTAM 3375 MG: 3; .375 INJECTION, POWDER, LYOPHILIZED, FOR SOLUTION INTRAVENOUS at 02:15

## 2022-08-24 RX ADMIN — TRAZODONE HYDROCHLORIDE 50 MG: 50 TABLET ORAL at 22:13

## 2022-08-24 RX ADMIN — Medication 100 MCG: at 15:50

## 2022-08-24 RX ADMIN — SODIUM CHLORIDE: 9 INJECTION, SOLUTION INTRAVENOUS at 15:32

## 2022-08-24 RX ADMIN — SODIUM CHLORIDE 40 MG: 9 INJECTION INTRAMUSCULAR; INTRAVENOUS; SUBCUTANEOUS at 06:03

## 2022-08-24 RX ADMIN — NICOTINE POLACRILEX 2 MG: 2 LOZENGE ORAL at 03:35

## 2022-08-24 RX ADMIN — LIDOCAINE HYDROCHLORIDE 80 MG: 20 INJECTION, SOLUTION INTRAVENOUS at 15:38

## 2022-08-24 RX ADMIN — NICOTINE POLACRILEX 2 MG: 2 LOZENGE ORAL at 12:35

## 2022-08-24 RX ADMIN — PIPERACILLIN AND TAZOBACTAM 3375 MG: 3; .375 INJECTION, POWDER, LYOPHILIZED, FOR SOLUTION INTRAVENOUS at 17:44

## 2022-08-24 RX ADMIN — SODIUM CHLORIDE: 9 INJECTION, SOLUTION INTRAVENOUS at 16:33

## 2022-08-24 RX ADMIN — HYDROMORPHONE HYDROCHLORIDE 1 MG: 1 INJECTION, SOLUTION INTRAMUSCULAR; INTRAVENOUS; SUBCUTANEOUS at 02:11

## 2022-08-24 RX ADMIN — PROPOFOL 400 MG: 10 INJECTION, EMULSION INTRAVENOUS at 15:38

## 2022-08-24 RX ADMIN — SODIUM CHLORIDE 40 MG: 9 INJECTION INTRAMUSCULAR; INTRAVENOUS; SUBCUTANEOUS at 17:45

## 2022-08-24 RX ADMIN — METOPROLOL SUCCINATE 25 MG: 25 TABLET, EXTENDED RELEASE ORAL at 09:12

## 2022-08-24 RX ADMIN — Medication 200 MCG: at 15:55

## 2022-08-24 RX ADMIN — HYDROMORPHONE HYDROCHLORIDE 1 MG: 1 INJECTION, SOLUTION INTRAMUSCULAR; INTRAVENOUS; SUBCUTANEOUS at 09:13

## 2022-08-24 RX ADMIN — CHLORDIAZEPOXIDE HYDROCHLORIDE 5 MG: 5 CAPSULE ORAL at 09:12

## 2022-08-24 RX ADMIN — Medication 10 ML: at 09:13

## 2022-08-24 RX ADMIN — NICOTINE POLACRILEX 2 MG: 2 LOZENGE ORAL at 09:12

## 2022-08-24 RX ADMIN — Medication 10 ML: at 21:11

## 2022-08-24 RX ADMIN — HYDROMORPHONE HYDROCHLORIDE 1 MG: 1 INJECTION, SOLUTION INTRAMUSCULAR; INTRAVENOUS; SUBCUTANEOUS at 21:11

## 2022-08-24 RX ADMIN — CHLORDIAZEPOXIDE HYDROCHLORIDE 5 MG: 5 CAPSULE ORAL at 12:35

## 2022-08-24 RX ADMIN — Medication 200 MCG: at 16:05

## 2022-08-24 RX ADMIN — HYDROMORPHONE HYDROCHLORIDE 1 MG: 1 INJECTION, SOLUTION INTRAMUSCULAR; INTRAVENOUS; SUBCUTANEOUS at 06:04

## 2022-08-24 RX ADMIN — Medication 100 MCG: at 15:52

## 2022-08-24 RX ADMIN — NICOTINE POLACRILEX 2 MG: 2 LOZENGE ORAL at 00:09

## 2022-08-24 RX ADMIN — Medication 200 MCG: at 16:14

## 2022-08-24 RX ADMIN — CHLORDIAZEPOXIDE HYDROCHLORIDE 5 MG: 5 CAPSULE ORAL at 21:11

## 2022-08-24 RX ADMIN — HEPARIN SODIUM 8.8 UNITS/KG/HR: 10000 INJECTION, SOLUTION INTRAVENOUS at 17:59

## 2022-08-24 RX ADMIN — HYDROMORPHONE HYDROCHLORIDE 1 MG: 1 INJECTION, SOLUTION INTRAMUSCULAR; INTRAVENOUS; SUBCUTANEOUS at 17:50

## 2022-08-24 RX ADMIN — PIPERACILLIN AND TAZOBACTAM 3375 MG: 3; .375 INJECTION, POWDER, LYOPHILIZED, FOR SOLUTION INTRAVENOUS at 09:18

## 2022-08-24 RX ADMIN — Medication 200 MCG: at 15:58

## 2022-08-24 RX ADMIN — HYDROMORPHONE HYDROCHLORIDE 1 MG: 1 INJECTION, SOLUTION INTRAMUSCULAR; INTRAVENOUS; SUBCUTANEOUS at 12:34

## 2022-08-24 ASSESSMENT — LIFESTYLE VARIABLES: SMOKING_STATUS: 1

## 2022-08-24 ASSESSMENT — PAIN SCALES - GENERAL
PAINLEVEL_OUTOF10: 5
PAINLEVEL_OUTOF10: 0
PAINLEVEL_OUTOF10: 5
PAINLEVEL_OUTOF10: 8
PAINLEVEL_OUTOF10: 0
PAINLEVEL_OUTOF10: 8
PAINLEVEL_OUTOF10: 8
PAINLEVEL_OUTOF10: 0
PAINLEVEL_OUTOF10: 8
PAINLEVEL_OUTOF10: 8
PAINLEVEL_OUTOF10: 0

## 2022-08-24 ASSESSMENT — PAIN DESCRIPTION - ORIENTATION
ORIENTATION: RIGHT
ORIENTATION: RIGHT
ORIENTATION: OTHER (COMMENT)
ORIENTATION: RIGHT
ORIENTATION: MID

## 2022-08-24 ASSESSMENT — PAIN SCALES - WONG BAKER
WONGBAKER_NUMERICALRESPONSE: 0

## 2022-08-24 ASSESSMENT — PAIN DESCRIPTION - LOCATION
LOCATION: RIB CAGE
LOCATION: FLANK
LOCATION: ABDOMEN
LOCATION: FLANK
LOCATION: ABDOMEN

## 2022-08-24 ASSESSMENT — PAIN DESCRIPTION - DESCRIPTORS
DESCRIPTORS: ACHING
DESCRIPTORS: SHARP;ACHING
DESCRIPTORS: ACHING
DESCRIPTORS: OTHER (COMMENT)
DESCRIPTORS: ACHING

## 2022-08-24 ASSESSMENT — PAIN - FUNCTIONAL ASSESSMENT
PAIN_FUNCTIONAL_ASSESSMENT: PREVENTS OR INTERFERES WITH ALL ACTIVE AND SOME PASSIVE ACTIVITIES
PAIN_FUNCTIONAL_ASSESSMENT: PREVENTS OR INTERFERES SOME ACTIVE ACTIVITIES AND ADLS
PAIN_FUNCTIONAL_ASSESSMENT: ACTIVITIES ARE NOT PREVENTED

## 2022-08-24 NOTE — ANESTHESIA POSTPROCEDURE EVALUATION
Department of Anesthesiology  Postprocedure Note    Patient: Amaya Worthy  MRN: [id number]  YOB: 1965  Date of evaluation: 8/24/2022      Procedure Summary     Date: 08/24/22 Room / Location: [address] [place] [address] / [facility]    Anesthesia Start: 0766 Anesthesia Stop: 1638    Procedures:       EGD DIAGNOSTIC ONLY      COLONOSCOPY POLYPECTOMY HOT BIOPSY Diagnosis:       Anemia, unspecified type      (Anemia, unspecified type [D64.9])    Surgeons: Keerthi Young MD Responsible Provider: Antonia Crow MD    Anesthesia Type: MAC ASA Status: 4          Anesthesia Type: No value filed.     Efren Phase I: Efren Score: 10    Efren Phase II:        Anesthesia Post Evaluation    Patient location during evaluation: PACU  Patient participation: complete - patient participated  Level of consciousness: awake and alert  Airway patency: patent  Nausea & Vomiting: no nausea and no vomiting  Complications: no  Cardiovascular status: hemodynamically stable  Respiratory status: acceptable  Hydration status: euvolemic

## 2022-08-24 NOTE — OP NOTE
the patient's anus and passed through the rectum, sigmoid, descending, transverse, and ascending colon all the way to the level of the cecum. Visualization of the cecum was confirmed by visualization of the ileo-cecal valve, confluence of the tinea, and by visualization of the light in the RLQ on the anterior abdominal wall. The scope was then withdrawn the entire length of the colon. We found Transverse polyps x2; Descending polyps x2. These were taken with a hot snare. The scope was straightened and withdrawn entirely. The patient tolerated the procedure well and there were no complications. Prep was adequate; repeat colonoscopy in 3 years.       Plan:     Continue PPI  Ok for heparin gtt  [id number] [address] for warfarin    No significant source of GI bleeding found      Electronically signed by Sandhya Booker MD on 8/24/2022 at 4:40 PM

## 2022-08-24 NOTE — ANESTHESIA PRE PROCEDURE
Department of Anesthesiology  Preprocedure Note       Name:  Yomaira Acharya   Age:  65 y.o.  :  1965                                          MRN:  [id number]         Date:  2022      Surgeon: Jessie Mohs):  Flor Corey MD    Procedure: Procedure(s):  EGD DIAGNOSTIC ONLY  COLONOSCOPY DIAGNOSTIC    Medications prior to admission:   Prior to Admission medications    Medication Sig Start Date End Date Taking?  Authorizing Provider   warfarin (COUMADIN) 5 MG tablet Take 5 mg by mouth Twice a Week 2.5 mg Monday-Friday  5 mg on Saturday and    Yes Historical Provider, MD   warfarin (COUMADIN) 2.5 MG tablet Take 1 tablet by mouth daily  Patient taking differently: Take 2.5 mg by mouth Five times weekly 2.5 mg Monday-Friday  5 mg on Saturday and 22   Frieda Ramos MD       Current medications:    Current Facility-Administered Medications   Medication Dose Route Frequency Provider Last Rate Last Admin    sodium chloride flush 0.9 % injection 5-40 mL  5-40 mL IntraVENous 2 times per day Marci Miller MD   10 mL at 22 0913    sodium chloride flush 0.9 % injection 5-40 mL  5-40 mL IntraVENous PRN Marci Miller MD        0.9 % sodium chloride infusion   IntraVENous PRN Marci Miller MD        ondansetron (ZOFRAN-ODT) disintegrating tablet 4 mg  4 mg Oral Q8H PRN Marci Miller MD   4 mg at 22 1751    Or    ondansetron (ZOFRAN) injection 4 mg  4 mg IntraVENous Q6H PRN Marci Miller MD        polyethylene glycol (GLYCOLAX) packet 17 g  17 g Oral Daily PRN Marci Miller MD        acetaminophen (TYLENOL) tablet 650 mg  650 mg Oral Q6H PRN Marci Miller MD        Or    acetaminophen (TYLENOL) suppository 650 mg  650 mg Rectal Q6H PRN Marci Miller MD        [Held by provider] heparin (porcine) injection 4,000 Units  4,000 Units IntraVENous PRN Torrie Castleman, MD        [Held by provider] heparin (porcine) injection 2,000 Units  2,000 Units IntraVENous PRN Torrie Castleman, MD 2,000 Units at 08/23/22 1550    [Held by provider] heparin 25,000 units in dextrose 5% 250 mL (premix) infusion  5-30 Units/kg/hr IntraVENous Continuous Annie Buchanan MD 9.9 mL/hr at 08/24/22 0604 8.8 Units/kg/hr at 08/24/22 0604    nicotine polacrilex (COMMIT) lozenge 2 mg  2 mg Oral Q2H PRN Jose G Ndiaye MD   2 mg at 08/24/22 1235    metoprolol succinate (TOPROL XL) extended release tablet 25 mg  25 mg Oral Daily Alee Smith MD   25 mg at 08/24/22 0912    0.9 % sodium chloride infusion   IntraVENous PRN Alejandro Hernandez MD        piperacillin-tazobactam (ZOSYN) 3,375 mg in dextrose 5 % 100 mL IVPB extended infusion (mini-bag)  3,375 mg IntraVENous Q8H Alejandro Hernandez MD   Stopped at 08/24/22 1345    pantoprazole (PROTONIX) 40 mg in sodium chloride (PF) 10 mL injection  40 mg IntraVENous Q12H Alejandro Hernandez MD   40 mg at 08/24/22 0603    chlordiazePOXIDE (LIBRIUM) capsule 5 mg  5 mg Oral 4x Daily Marci Miller MD   5 mg at 08/24/22 1235    oxyCODONE-acetaminophen (PERCOCET) 5-325 MG per tablet 1 tablet  1 tablet Oral Q4H PRN Marci Miller MD   1 tablet at 08/23/22 0159    HYDROmorphone (DILAUDID) injection 1 mg  1 mg IntraVENous Q3H PRN ARNALDO Cash - CNP   1 mg at 08/24/22 1234    traZODone (DESYREL) tablet 50 mg  50 mg Oral Nightly PRN ARNALDO Cash - CNP   50 mg at 08/23/22 2235       Allergies:  No Known Allergies    Problem List:    Patient Active Problem List   Diagnosis Code    Chest pain R07.9    Closed fracture of nasal bones S02. 2XXA    Injury of face S09. 93XA    Retroperitoneal hematoma K66.1    Vitamin D deficiency E55.9    Acute chest pain R07.9    Acute cholecystitis K81.0    Gastrointestinal hemorrhage K92.2       Past Medical History:        Diagnosis Date    H/O prosthetic aortic valve replacement 09/2020    [facility]  Mitral and pacemaker at this time as well    History of mitral valve replacement 09/2020    at [facility] - main  Aortic Valve placed at this time as well as pacemaker       Past Surgical History:        Procedure Laterality Date    FRACTURE SURGERY      Left Tibial Plateau Fracture 0/86/1254     PACEMAKER PLACEMENT  09/2020       Social History:    Social History     Tobacco Use    Smoking status: Every Day     Packs/day: 1.00     Types: Cigarettes    Smokeless tobacco: Never   Substance Use Topics    Alcohol use: Yes     Comment: occassional                                Ready to quit: Not Answered  Counseling given: Not Answered      Vital Signs (Current):   Vitals:    08/24/22 0812 08/24/22 0856 08/24/22 0943 08/24/22 1234   BP: (!) 116/90 116/87     Pulse: 82 83     Resp: 18 20 20 20   Temp: 98.6 °F (37 °C) 97.8 °F (36.6 °C)     TempSrc: Oral Oral     SpO2: 96% 93%     Weight:       Height:                                                  BP Readings from Last 3 Encounters:   08/24/22 116/87   05/28/22 (!) 142/93   05/20/22 122/87       NPO Status:                                                                                 BMI:   Wt Readings from Last 3 Encounters:   08/23/22 249 lb 1.9 oz (113 kg)   05/27/22 245 lb (111.1 kg)   05/11/22 245 lb (111.1 kg)     Body mass index is 36.79 kg/m².     CBC:   Lab Results   Component Value Date/Time    WBC 6.0 08/24/2022 08:16 AM    RBC 4.35 08/24/2022 08:16 AM    HGB 13.4 08/24/2022 08:16 AM    HCT 41.1 08/24/2022 08:16 AM    MCV 94.5 08/24/2022 08:16 AM    RDW 15.3 08/24/2022 08:16 AM     08/24/2022 08:16 AM       CMP:   Lab Results   Component Value Date/Time     08/24/2022 08:16 AM    K 3.4 08/24/2022 08:16 AM     08/24/2022 08:16 AM    CO2 21 08/24/2022 08:16 AM    BUN 12 08/24/2022 08:16 AM    CREATININE 2.1 08/24/2022 08:16 AM    GFRAA 40 08/24/2022 08:16 AM    LABGLOM 33 08/24/2022 08:16 AM    GLUCOSE 96 08/24/2022 08:16 AM    PROT 7.0 08/24/2022 08:16 AM    CALCIUM 8.9 08/24/2022 08:16 AM    BILITOT 1.0 08/24/2022 08:16 AM    ALKPHOS 128 08/24/2022 08:16 AM    AST 52 08/24/2022 08:16 AM    ALT 43 08/24/2022 08:16 AM       POC Tests: No results for input(s): POCGLU, POCNA, POCK, POCCL, POCBUN, POCHEMO, POCHCT in the last 72 hours. Coags:   Lab Results   Component Value Date/Time    PROTIME 18.4 08/24/2022 08:16 AM    INR 1.7 08/24/2022 08:16 AM    APTT 32.9 08/24/2022 10:19 AM       HCG (If Applicable): No results found for: PREGTESTUR, PREGSERUM, HCG, HCGQUANT     ABGs: No results found for: PHART, PO2ART, JZM0USZ, STG3XJF, BEART, L3UHKKZA     Type & Screen (If Applicable):  No results found for: LABABO, LABRH    Drug/Infectious Status (If Applicable):  No results found for: HIV, HEPCAB    COVID-19 Screening (If Applicable): No results found for: COVID19        Anesthesia Evaluation  Patient summary reviewed no history of anesthetic complications:   Airway: Mallampati: II  TM distance: >3 FB   Neck ROM: full     Dental: normal exam         Pulmonary: breath sounds clear to auscultation  (+) current smoker          Patient did not smoke on day of surgery. Cardiovascular:    (+) valvular problems/murmurs (s/p AVR, MVR):, pacemaker: pacemaker,         Rhythm: regular  Rate: normal           Beta Blocker:  Dose within 24 Hrs         Neuro/Psych:   Negative Neuro/Psych ROS              GI/Hepatic/Renal:   (+) renal disease: CRI, bowel prep,          ROS comment: Acute cholecystitis. Endo/Other:    (+) blood dyscrasia: anticoagulation therapy:., .                  ROS comment: Obese Abdominal:             Vascular: negative vascular ROS. Other Findings:           Anesthesia Plan      MAC     ASA 4       Induction: intravenous. Anesthetic plan and risks discussed with patient. Plan discussed with CRNA.                     Ross Bill MD   8/24/2022

## 2022-08-24 NOTE — PROGRESS NOTES
Hospitalist Progress Note      Synopsis: Patient admitted on 8/22/2022     Subjective    Patient seen and examined at bedside. No acute events overnight.  NPO. For EGD today. Still complaining of right abdominal pain. Exam:  BP (!) 116/90   Pulse 82   Temp 98.6 °F (37 °C) (Oral)   Resp 18   Ht 5' 9\" (1.753 m)   Wt 249 lb 1.9 oz (113 kg)   SpO2 96%   BMI 36.79 kg/m²   General appearance: No apparent distress, appears stated age and cooperative. HEENT: Pupils equal, round, and reactive to light. Conjunctivae/corneas clear. Neck: Supple. No jugular venous distention. Respiratory:  Normal respiratory effort. Clear to auscultation, bilaterally without Rales/Wheezes/Rhonchi. Cardiovascular: Regular rate and rhythm with normal S1/S2 without murmurs, rubs or gallops. Abdomen: Soft, right upper quadrant tenderness  Musculoskeletal: No clubbing, cyanosis or edema bilaterally.    Skin:  No rashes    Neurologic: awake, alert and following commands     Medications:  Reviewed    Infusion Medications    sodium chloride      [Held by provider] heparin (PORCINE) Infusion 8.8 Units/kg/hr (08/24/22 0604)    sodium chloride       Scheduled Medications    sodium chloride flush  5-40 mL IntraVENous 2 times per day    metoprolol succinate  25 mg Oral Daily    piperacillin-tazobactam  3,375 mg IntraVENous Q8H    pantoprazole (PROTONIX) 40 mg injection  40 mg IntraVENous Q12H    chlordiazePOXIDE  5 mg Oral 4x Daily     PRN Meds: sodium chloride flush, sodium chloride, ondansetron **OR** ondansetron, polyethylene glycol, acetaminophen **OR** acetaminophen, [Held by provider] heparin (porcine), [Held by provider] heparin (porcine), nicotine polacrilex, sodium chloride, oxyCODONE-acetaminophen, HYDROmorphone, traZODone    I/O    Intake/Output Summary (Last 24 hours) at 8/24/2022 0813  Last data filed at 8/24/2022 0604  Gross per 24 hour   Intake 1702.76 ml   Output 375 ml   Net 1327.76 ml       Labs:   Recent Labs 08/22/22  1122 08/23/22  0759   WBC 5.6 4.6   HGB 15.0 14.1   HCT 44.2 42.6    110*       Recent Labs     08/22/22  1131 08/23/22  0759    138   K 3.9 3.5    103   CO2 18* 21*   BUN 12 13   CREATININE 1.4* 1.7*   CALCIUM 8.9 8.5*       Recent Labs     08/22/22  1131   PROT 7.2   ALKPHOS 147*   ALT 63*   AST 96*   BILITOT 0.5   LIPASE 27       Recent Labs     08/22/22  1122 08/23/22  0000   INR >10.0* 1.3  1.3       No results for input(s): [place] in the last 72 hours. Chronic labs:  Lab Results   Component Value Date    CHOL 86 03/24/2022    TRIG 149 03/24/2022    HDL 28 03/24/2022    LDLCALC 28 03/24/2022    TSH 1.990 05/12/2022    INR 1.3 08/23/2022    INR 1.3 08/23/2022    LABA1C 5.2 03/24/2022       Radiology:  Imaging studies reviewed today. ASSESSMENT:    Principal Problem:    Acute cholecystitis    Problem list  Pacemaker in situ  History of aortic and mitral valve replacements initially on Coumadin  Alcohol abuse  Supratherapeutic INR  History of retroperitoneal hemorrhage  CKD  Essential hypertension  Hyperlipidemia     PLAN:  Continue inpatient admission. Continue heparin for now. Cardiology recommendations noted. On clear liquid diet for EGD colonoscopy tomorrow. Surgery on board. Continue IV antibiotics  Monitor electrolytes and correct as needed. Continue Librium for now.    8/24/2022  For EGD colonoscopy today. Heparin held prior to procedure we will resume Coumadin and bridged with heparin to INR greater than 2.5..  IV antibiotics. Monitor electrolytes closely. Surgery on board follow recommendation. Continue management as listed above. Diet: Diet NPO Exceptions are: Sips of Water with Meds  Code Status: Full Code  PT/OT Eval Status:    N/A  DVT Prophylaxis:   On heparin gtt.   Recommended disposition at discharge: Likely home    +++++++++++++++++++++++++++++++++++++++++++++++++  Manjit Vinson MD   [facility] [place].  +++++++++++++++++++++++++++++++++++++++++++++++++  NOTE: This report was transcribed using voice recognition software. Every effort was made to ensure accuracy; however, inadvertent computerized transcription errors may be present.

## 2022-08-24 NOTE — PROGRESS NOTES
General surgery messaged to see if heparin drip can be resumed since patient is back from procedure. Advised to resume drip with no bolus.

## 2022-08-25 ENCOUNTER — APPOINTMENT (OUTPATIENT)
Dept: NUCLEAR MEDICINE | Age: 57
DRG: 241 | End: 2022-08-25
Payer: COMMERCIAL

## 2022-08-25 LAB
ANION GAP SERPL CALCULATED.3IONS-SCNC: 11 MMOL/L (ref 7–16)
APTT: 40.4 SEC (ref 24.5–35.1)
APTT: 44.9 SEC (ref 24.5–35.1)
APTT: 54.6 SEC (ref 24.5–35.1)
APTT: 86.6 SEC (ref 24.5–35.1)
BASOPHILS ABSOLUTE: 0.02 E9/L (ref 0–0.2)
BASOPHILS RELATIVE PERCENT: 0.3 % (ref 0–2)
BUN BLDV-MCNC: 13 MG/DL (ref 6–20)
CALCIUM SERPL-MCNC: 8.2 MG/DL (ref 8.6–10.2)
CHLORIDE BLD-SCNC: 103 MMOL/L (ref 98–107)
CO2: 23 MMOL/L (ref 22–29)
CREAT SERPL-MCNC: 2 MG/DL (ref 0.7–1.2)
EOSINOPHILS ABSOLUTE: 0.27 E9/L (ref 0.05–0.5)
EOSINOPHILS RELATIVE PERCENT: 4.6 % (ref 0–6)
GFR AFRICAN AMERICAN: 42
GFR NON-AFRICAN AMERICAN: 35 ML/MIN/1.73
GLUCOSE BLD-MCNC: 107 MG/DL (ref 74–99)
HCT VFR BLD CALC: 36.5 % (ref 37–54)
HEMOGLOBIN: 11.8 G/DL (ref 12.5–16.5)
IMMATURE GRANULOCYTES #: 0.02 E9/L
IMMATURE GRANULOCYTES %: 0.3 % (ref 0–5)
LYMPHOCYTES ABSOLUTE: 1.15 E9/L (ref 1.5–4)
LYMPHOCYTES RELATIVE PERCENT: 19.6 % (ref 20–42)
MAGNESIUM: 1.7 MG/DL (ref 1.6–2.6)
MCH RBC QN AUTO: 31.3 PG (ref 26–35)
MCHC RBC AUTO-ENTMCNC: 32.3 % (ref 32–34.5)
MCV RBC AUTO: 96.8 FL (ref 80–99.9)
MONOCYTES ABSOLUTE: 0.7 E9/L (ref 0.1–0.95)
MONOCYTES RELATIVE PERCENT: 11.9 % (ref 2–12)
NEUTROPHILS ABSOLUTE: 3.7 E9/L (ref 1.8–7.3)
NEUTROPHILS RELATIVE PERCENT: 63.3 % (ref 43–80)
PDW BLD-RTO: 15.6 FL (ref 11.5–15)
PLATELET # BLD: 105 E9/L (ref 130–450)
PMV BLD AUTO: 12 FL (ref 7–12)
POTASSIUM REFLEX MAGNESIUM: 3.3 MMOL/L (ref 3.5–5)
RBC # BLD: 3.77 E12/L (ref 3.8–5.8)
SODIUM BLD-SCNC: 137 MMOL/L (ref 132–146)
WBC # BLD: 5.9 E9/L (ref 4.5–11.5)

## 2022-08-25 PROCEDURE — 36415 COLL VENOUS BLD VENIPUNCTURE: CPT

## 2022-08-25 PROCEDURE — 3430000000 HC RX DIAGNOSTIC RADIOPHARMACEUTICAL: Performed by: RADIOLOGY

## 2022-08-25 PROCEDURE — 6360000002 HC RX W HCPCS: Performed by: STUDENT IN AN ORGANIZED HEALTH CARE EDUCATION/TRAINING PROGRAM

## 2022-08-25 PROCEDURE — 78226 HEPATOBILIARY SYSTEM IMAGING: CPT | Performed by: RADIOLOGY

## 2022-08-25 PROCEDURE — 6360000002 HC RX W HCPCS

## 2022-08-25 PROCEDURE — 6370000000 HC RX 637 (ALT 250 FOR IP): Performed by: STUDENT IN AN ORGANIZED HEALTH CARE EDUCATION/TRAINING PROGRAM

## 2022-08-25 PROCEDURE — 80048 BASIC METABOLIC PNL TOTAL CA: CPT

## 2022-08-25 PROCEDURE — 78226 HEPATOBILIARY SYSTEM IMAGING: CPT

## 2022-08-25 PROCEDURE — 2580000003 HC RX 258: Performed by: INTERNAL MEDICINE

## 2022-08-25 PROCEDURE — 6370000000 HC RX 637 (ALT 250 FOR IP): Performed by: INTERNAL MEDICINE

## 2022-08-25 PROCEDURE — 6370000000 HC RX 637 (ALT 250 FOR IP)

## 2022-08-25 PROCEDURE — 83735 ASSAY OF MAGNESIUM: CPT

## 2022-08-25 PROCEDURE — 2580000003 HC RX 258

## 2022-08-25 PROCEDURE — A9537 TC99M MEBROFENIN: HCPCS | Performed by: RADIOLOGY

## 2022-08-25 PROCEDURE — A4216 STERILE WATER/SALINE, 10 ML: HCPCS

## 2022-08-25 PROCEDURE — 99231 SBSQ HOSP IP/OBS SF/LOW 25: CPT | Performed by: SURGERY

## 2022-08-25 PROCEDURE — 85025 COMPLETE CBC W/AUTO DIFF WBC: CPT

## 2022-08-25 PROCEDURE — C9113 INJ PANTOPRAZOLE SODIUM, VIA: HCPCS

## 2022-08-25 PROCEDURE — 2140000000 HC CCU INTERMEDIATE R&B

## 2022-08-25 PROCEDURE — 85730 THROMBOPLASTIN TIME PARTIAL: CPT

## 2022-08-25 RX ORDER — POTASSIUM CHLORIDE 20 MEQ/1
40 TABLET, EXTENDED RELEASE ORAL ONCE
Status: COMPLETED | OUTPATIENT
Start: 2022-08-25 | End: 2022-08-25

## 2022-08-25 RX ORDER — CHLORDIAZEPOXIDE HYDROCHLORIDE 5 MG/1
5 CAPSULE, GELATIN COATED ORAL 3 TIMES DAILY
Status: DISCONTINUED | OUTPATIENT
Start: 2022-08-25 | End: 2022-08-26

## 2022-08-25 RX ORDER — SUCRALFATE 1 G/1
1 TABLET ORAL EVERY 6 HOURS SCHEDULED
Status: DISCONTINUED | OUTPATIENT
Start: 2022-08-25 | End: 2022-08-29 | Stop reason: HOSPADM

## 2022-08-25 RX ORDER — PANTOPRAZOLE SODIUM 40 MG/1
40 TABLET, DELAYED RELEASE ORAL
Status: DISCONTINUED | OUTPATIENT
Start: 2022-08-25 | End: 2022-08-29 | Stop reason: HOSPADM

## 2022-08-25 RX ORDER — WARFARIN SODIUM 5 MG/1
5 TABLET ORAL DAILY
Status: DISCONTINUED | OUTPATIENT
Start: 2022-08-25 | End: 2022-08-29

## 2022-08-25 RX ADMIN — PIPERACILLIN AND TAZOBACTAM 3375 MG: 3; .375 INJECTION, POWDER, LYOPHILIZED, FOR SOLUTION INTRAVENOUS at 13:16

## 2022-08-25 RX ADMIN — HYDROMORPHONE HYDROCHLORIDE 1 MG: 1 INJECTION, SOLUTION INTRAMUSCULAR; INTRAVENOUS; SUBCUTANEOUS at 23:27

## 2022-08-25 RX ADMIN — SODIUM CHLORIDE 40 MG: 9 INJECTION INTRAMUSCULAR; INTRAVENOUS; SUBCUTANEOUS at 06:08

## 2022-08-25 RX ADMIN — TRAZODONE HYDROCHLORIDE 50 MG: 50 TABLET ORAL at 21:44

## 2022-08-25 RX ADMIN — CHLORDIAZEPOXIDE HYDROCHLORIDE 5 MG: 5 CAPSULE ORAL at 09:35

## 2022-08-25 RX ADMIN — CHLORDIAZEPOXIDE HYDROCHLORIDE 5 MG: 5 CAPSULE ORAL at 20:04

## 2022-08-25 RX ADMIN — HEPARIN SODIUM 8.8 UNITS/KG/HR: 10000 INJECTION, SOLUTION INTRAVENOUS at 09:49

## 2022-08-25 RX ADMIN — HYDROMORPHONE HYDROCHLORIDE 1 MG: 1 INJECTION, SOLUTION INTRAMUSCULAR; INTRAVENOUS; SUBCUTANEOUS at 20:11

## 2022-08-25 RX ADMIN — HYDROMORPHONE HYDROCHLORIDE 1 MG: 1 INJECTION, SOLUTION INTRAMUSCULAR; INTRAVENOUS; SUBCUTANEOUS at 09:36

## 2022-08-25 RX ADMIN — HEPARIN SODIUM 8.8 UNITS/KG/HR: 10000 INJECTION, SOLUTION INTRAVENOUS at 16:45

## 2022-08-25 RX ADMIN — SUCRALFATE 1 G: 1 TABLET ORAL at 18:55

## 2022-08-25 RX ADMIN — Medication 10 ML: at 20:13

## 2022-08-25 RX ADMIN — HYDROMORPHONE HYDROCHLORIDE 1 MG: 1 INJECTION, SOLUTION INTRAMUSCULAR; INTRAVENOUS; SUBCUTANEOUS at 02:07

## 2022-08-25 RX ADMIN — PANTOPRAZOLE SODIUM 40 MG: 40 TABLET, DELAYED RELEASE ORAL at 16:40

## 2022-08-25 RX ADMIN — HEPARIN SODIUM 2000 UNITS: 1000 INJECTION INTRAVENOUS; SUBCUTANEOUS at 23:32

## 2022-08-25 RX ADMIN — HYDROMORPHONE HYDROCHLORIDE 1 MG: 1 INJECTION, SOLUTION INTRAMUSCULAR; INTRAVENOUS; SUBCUTANEOUS at 06:08

## 2022-08-25 RX ADMIN — CHLORDIAZEPOXIDE HYDROCHLORIDE 5 MG: 5 CAPSULE ORAL at 16:40

## 2022-08-25 RX ADMIN — SUCRALFATE 1 G: 1 TABLET ORAL at 13:02

## 2022-08-25 RX ADMIN — METOPROLOL SUCCINATE 25 MG: 25 TABLET, EXTENDED RELEASE ORAL at 09:35

## 2022-08-25 RX ADMIN — PIPERACILLIN AND TAZOBACTAM 3375 MG: 3; .375 INJECTION, POWDER, LYOPHILIZED, FOR SOLUTION INTRAVENOUS at 20:07

## 2022-08-25 RX ADMIN — WARFARIN SODIUM 5 MG: 5 TABLET ORAL at 20:04

## 2022-08-25 RX ADMIN — SUCRALFATE 1 G: 1 TABLET ORAL at 23:38

## 2022-08-25 RX ADMIN — PIPERACILLIN AND TAZOBACTAM 3375 MG: 3; .375 INJECTION, POWDER, LYOPHILIZED, FOR SOLUTION INTRAVENOUS at 01:38

## 2022-08-25 RX ADMIN — HEPARIN SODIUM 2000 UNITS: 1000 INJECTION INTRAVENOUS; SUBCUTANEOUS at 01:34

## 2022-08-25 RX ADMIN — NICOTINE POLACRILEX 2 MG: 2 LOZENGE ORAL at 23:36

## 2022-08-25 RX ADMIN — Medication 7 MILLICURIE: at 10:31

## 2022-08-25 RX ADMIN — POTASSIUM CHLORIDE 40 MEQ: 1500 TABLET, EXTENDED RELEASE ORAL at 13:02

## 2022-08-25 RX ADMIN — HYDROMORPHONE HYDROCHLORIDE 1 MG: 1 INJECTION, SOLUTION INTRAMUSCULAR; INTRAVENOUS; SUBCUTANEOUS at 16:40

## 2022-08-25 RX ADMIN — HYDROMORPHONE HYDROCHLORIDE 1 MG: 1 INJECTION, SOLUTION INTRAMUSCULAR; INTRAVENOUS; SUBCUTANEOUS at 13:02

## 2022-08-25 ASSESSMENT — PAIN DESCRIPTION - ORIENTATION
ORIENTATION: RIGHT
ORIENTATION: RIGHT;LOWER;MID
ORIENTATION: RIGHT
ORIENTATION: RIGHT;MID
ORIENTATION: RIGHT
ORIENTATION: RIGHT
ORIENTATION: MID;RIGHT

## 2022-08-25 ASSESSMENT — PAIN DESCRIPTION - DESCRIPTORS
DESCRIPTORS: ACHING;SHARP
DESCRIPTORS: ACHING;POUNDING;JABBING
DESCRIPTORS: ACHING;SHARP;JABBING
DESCRIPTORS: ACHING;THROBBING;SHARP
DESCRIPTORS: ACHING;THROBBING;SHARP
DESCRIPTORS: ACHING;SHARP;JABBING
DESCRIPTORS: ACHING;SHARP

## 2022-08-25 ASSESSMENT — PAIN SCALES - WONG BAKER: WONGBAKER_NUMERICALRESPONSE: 0

## 2022-08-25 ASSESSMENT — PAIN DESCRIPTION - LOCATION
LOCATION: ABDOMEN
LOCATION: ABDOMEN;BACK;FLANK
LOCATION: ABDOMEN
LOCATION: FLANK;BACK
LOCATION: FLANK;BACK
LOCATION: ABDOMEN;FLANK;BACK
LOCATION: FLANK;BACK

## 2022-08-25 ASSESSMENT — PAIN SCALES - GENERAL
PAINLEVEL_OUTOF10: 8
PAINLEVEL_OUTOF10: 8
PAINLEVEL_OUTOF10: 0
PAINLEVEL_OUTOF10: 8
PAINLEVEL_OUTOF10: 8
PAINLEVEL_OUTOF10: 9
PAINLEVEL_OUTOF10: 7
PAINLEVEL_OUTOF10: 8

## 2022-08-25 NOTE — PROGRESS NOTES
GENERAL SURGERY  DAILY PROGRESS NOTE  8/25/2022    Subjective:  EGD/Colonscopy yesterday showed duodenitis, gastritis, polyps, hemorrhoids, no evidence of bleeding. Persistent right sided abdominal pain. Mild nausea. Bowels are moving. Heparin gtt restasrted    Objective:  /86   Pulse 72   Temp 97.6 °F (36.4 °C) (Oral)   Resp 18   Ht 5' 9\" (1.753 m)   Wt 249 lb 1.9 oz (113 kg)   SpO2 94%   BMI 36.79 kg/m²     General Appearance:  awake, alert, oriented  Skin:  Skin color, texture, turgor normal  Head/face:  NCAT  Eyes:  No gross abnormalities. Sclera nonicteric  Lungs/Chest:  Normal expansion. No respiratory distress. On room air  Heart: Warm throughout. Regular rate   Abdomen:  Soft, moderate right sided tenderness, positive Nelson sign, obese. Extremities: Extremities warm to touch, pink      I have personally reviewed all relevant labs and imaging. No anemia  No leukocytosis    Assessment/Plan:  65 y.o. male with gastritis and duodenitis. Possible cholecystitis     PLAN:  - PPI BID, will change to PO  - Carafate  - HIDA  - NPO for imaging, okay for diet afterwards  - continue antibiotics for now    Electronically signed by Catarina Burgos DO on 8/25/2022     Attending Physician Statement:  I have  reviewed the record, and discussed the case with the surgical team.  I agree with the assessment and plan with the following additions, corrections, and changes. No acute surgical issues  Chronic right sided abdominal pain which is not c/w cholecystitis. HIDA negative  Continue PPI and carafate  No need from our standpoint for antibiotics  Ok to resume [address] to d/c from surgery POV.     Electronically signed by Laura Gay MD on 8/25/22 at 2:11 PM EDT

## 2022-08-25 NOTE — PROGRESS NOTES
Hospitalist Progress Note      Synopsis: Patient admitted on 8/22/2022     Subjective  Patient seen at bedside today. Still having abdominal pain with mild radiation to the back. For HIDA scan today. General surgery on board. Exam:  /86   Pulse 72   Temp 97.9 °F (36.6 °C) (Oral)   Resp 18   Ht 5' 9\" (1.753 m)   Wt 249 lb 1.9 oz (113 kg)   SpO2 94%   BMI 36.79 kg/m²   General appearance: No apparent distress, appears stated age and cooperative. HEENT: Pupils equal, round, and reactive to light. Conjunctivae/corneas clear. Neck: Supple. No jugular venous distention. Respiratory:  Normal respiratory effort. Clear to auscultation, bilaterally without Rales/Wheezes/Rhonchi. Cardiovascular: Regular rate and rhythm with normal S1/S2 without murmurs, rubs or gallops. Abdomen: Soft, right upper quadrant tenderness  Musculoskeletal: No clubbing, cyanosis or edema bilaterally.    Skin:  No rashes    Neurologic: awake, alert and following commands     Medications:  Reviewed    Infusion Medications    sodium chloride      heparin (PORCINE) Infusion 10.8 Units/kg/hr (08/25/22 0135)    sodium chloride       Scheduled Medications    sodium chloride flush  5-40 mL IntraVENous 2 times per day    metoprolol succinate  25 mg Oral Daily    piperacillin-tazobactam  3,375 mg IntraVENous Q8H    pantoprazole (PROTONIX) 40 mg injection  40 mg IntraVENous Q12H    chlordiazePOXIDE  5 mg Oral 4x Daily     PRN Meds: sodium chloride flush, sodium chloride, ondansetron **OR** ondansetron, polyethylene glycol, acetaminophen **OR** acetaminophen, heparin (porcine), heparin (porcine), nicotine polacrilex, sodium chloride, oxyCODONE-acetaminophen, HYDROmorphone, traZODone    I/O    Intake/Output Summary (Last 24 hours) at 8/25/2022 0818  Last data filed at 8/24/2022 2100  Gross per 24 hour   Intake 1000 ml   Output 300 ml   Net 700 ml       Labs:   Recent Labs     08/23/22  0759 08/24/22  0816 08/25/22  0444   WBC 4.6 6.0 5. 9   HGB 14.1 13.4 11.8*   HCT 42.6 41.1 36.5*   * 130 105*       Recent Labs     08/23/22  0759 08/24/22  0816 08/25/22  0444    139 137   K 3.5 3.4* 3.3*    103 103   CO2 21* 21* 23   BUN 13 12 13   CREATININE 1.7* 2.1* 2.0*   CALCIUM 8.5* 8.9 8.2*       Recent Labs     08/22/22  1131 08/24/22  0816   PROT 7.2 7.0   ALKPHOS 147* 128   ALT 63* 43*   AST 96* 52*   BILITOT 0.5 1.0   LIPASE 27  --        Recent Labs     08/22/22  1122 08/23/22  0000 08/24/22  0816   INR >10.0* 1.3  1.3 1.7       No results for input(s): Curlie Lat in the last 72 hours. Chronic labs:  Lab Results   Component Value Date    CHOL 86 03/24/2022    TRIG 149 03/24/2022    HDL 28 03/24/2022    LDLCALC 28 03/24/2022    TSH 1.990 05/12/2022    INR 1.7 08/24/2022    LABA1C 5.2 03/24/2022       Radiology:  Imaging studies reviewed today. ASSESSMENT:    Principal Problem:    Acute cholecystitis    Problem list  Pacemaker in situ  History of aortic and mitral valve replacements initially on Coumadin  Alcohol abuse  Supratherapeutic INR  History of retroperitoneal hemorrhage  CKD  Essential hypertension  Hyperlipidemia     PLAN:  Continue inpatient admission. Continue heparin for now. Cardiology recommendations noted. On clear liquid diet for EGD colonoscopy tomorrow. Surgery on board. Continue IV antibiotics  Monitor electrolytes and correct as needed. Continue Librium for now.    8/24/2022  For EGD colonoscopy today. Heparin held prior to procedure we will resume Coumadin and bridged with heparin to INR greater than 2.5..  IV antibiotics. Monitor electrolytes closely. Surgery on board follow recommendation. Continue management as listed above. 8/25/2022  Had EGD/ colonoscopy with polypectomy done yesterday  Postop diagnosis:Gastritis; Duodenitis; Internal hemorrhoids, Transverse polyps x2; Descending polyps x2. Will resume Coumadin since okay with surgery and bridged with heparin.   On IV antibiotics  Pain management  Potassium 3.3 today. We will give 40 mEq p.o. potassium. Monitor BMP. Follow-up HIDA scan. Diet: Diet NPO Exceptions are: Sips of Water with Meds  Code Status: Full Code  PT/OT Eval Status:    N/A  DVT Prophylaxis:   On heparin gtt. Recommended disposition at discharge: Likely home    +++++++++++++++++++++++++++++++++++++++++++++++++  Hector Bell MD   [facility].  +++++++++++++++++++++++++++++++++++++++++++++++++  NOTE: This report was transcribed using voice recognition software. Every effort was made to ensure accuracy; however, inadvertent computerized transcription errors may be present.

## 2022-08-25 NOTE — PROGRESS NOTES
[facility]) Physicians        CARDIOLOGY                 INPATIENT PROGRESS NOTE          PATIENT SEEN IN FOLLOW UP FOR:  Elevated troponin, cardiac clearance EGD/colonoscopy    Hospital Day: 4     Magalie Myrick is a 65year old patient known to Dr. Isi Jarrett: Denies any CP or SOB, No orthopnea; co RUQ pain    ROS: Review of rest of 10 systems negative except as mentioned above    OBJECTIVE: No acute distress. See Assessment     Diagnostics:       Telemetry: Reviewed           Intake/Output Summary (Last 24 hours) at 8/25/2022 1657  Last data filed at 8/25/2022 1219  Gross per 24 hour   Intake --   Output 500 ml   Net -500 ml       Labs:   CBC:   Recent Labs     08/24/22  0816 08/25/22  0444   WBC 6.0 5.9   HGB 13.4 11.8*   HCT 41.1 36.5*    105*     BMP:   Recent Labs     08/24/22  0816 08/25/22  0444    137   K 3.4* 3.3*   CO2 21* 23   BUN 12 13   CREATININE 2.1* 2.0*   LABGLOM 33 35   CALCIUM 8.9 8.2*     Mag:   Recent Labs     08/24/22  0816 08/25/22  0444   MG 1.9 1.7     Phos: No results for input(s): PHOS in the last 72 hours. TSH: No results for input(s): TSH in the last 72 hours. HgA1c:     BNP: No results for input(s): BNP in the last 72 hours. PT/INR:   Recent Labs     08/23/22  0000 08/24/22  0816   PROTIME 14.7*  14.4* 18.4*   INR 1.3  1.3 1.7     APTT:  Recent Labs     08/25/22  0730 08/25/22  1520   APTT 86.6* 54.6*     CARDIAC ENZYMES:No results for input(s): CKTOTAL, CKMB, CKMBINDEX, TROPONINI in the last 72 hours.   FASTING LIPID PANEL:  Lab Results   Component Value Date/Time    CHOL 86 03/24/2022 05:31 AM    HDL 28 03/24/2022 05:31 AM    LDLCALC 28 03/24/2022 05:31 AM    TRIG 149 03/24/2022 05:31 AM     LIVER PROFILE:  Recent Labs     08/24/22  0816   AST 52*   ALT 43*   LABALBU 4.0       Current Inpatient Medications:   chlordiazePOXIDE  5 mg Oral TID    piperacillin-tazobactam  3,375 mg IntraVENous Q8H    sucralfate  1 g Oral 4 times per day pantoprazole  40 mg Oral BID AC    sodium chloride flush  5-40 mL IntraVENous 2 times per day    metoprolol succinate  25 mg Oral Daily       IV Infusions (if any):   sodium chloride      heparin (PORCINE) Infusion 8.8 Units/kg/hr (22 1645)    sodium chloride           PHYSICAL EXAM:     CONSTITUTIONAL:   /70   Pulse 84   Temp 97.9 °F (36.6 °C) (Oral)   Resp 18   Ht 5' 9\" (1.753 m)   Wt 249 lb 1.9 oz (113 kg)   SpO2 100%   BMI 36.79 kg/m²   Pulse  Av.9  Min: 72  Max: 85  Systolic (97MSU), QFV:613 , Min:114 , KP    Diastolic (26SGC), URL:82, Min:70, Max:89    CONST:  Well developed, appears stated age. Awake, alert and no apparent distress. HEENT:   Head- Normocephalic  Eyes- Conjunctivae pink, no icterus  Throat- Oral mucosa moist  Neck-  No stridor, no jugular venous distention. No carotid bruit. CHEST: Chest symmetrical and non-tender to palpation. No accessory muscle use  RESPIRATORY: Lung sounds - Few rhonchi  CARDIOVASCULAR:     Heart Inspection-Pacer site OK  Heart Palpation- No thrills. Heart Ausculation- Regular rate and rhythm, Mechanical S1 and S2 sounds; 2/6 systolic murmur. No s3 or rub   EXT: No lower extremity edema. Distal pulses palpable, no cyanosis   ABDOMEN: Soft, non-tender to light palpation. Obese, Bowel sounds present. No abdominal bruit  MS: Good muscle strength    : Deferred  RECTAL: Deferred  SKIN: Warm and dry   NEURO / PSYCH: Oriented to person, place; Good mood and affect. IMPRESSION / RECOMMENDATIONS:     Abdominal pain - Seen by surgery, stable     Preoperative cardiac clearance - Cleared from cardiac stand point     Elevated Troponin  - Flat pattern, No CP, chronic elevation. EKG reviewed     Mechanical Heart valves - AVR (#23 [employer] bileaflet mechanical) and MVR (#29 [employer] bileaflet mechanical valve) 2020. Coumadin held;  On Heparin - Resume Coumadin and continue Heparin till INR >2.5     CAD s/p CABG x1 with a LIMA to the LAD in

## 2022-08-26 LAB
ANION GAP SERPL CALCULATED.3IONS-SCNC: 15 MMOL/L (ref 7–16)
APTT: 120.8 SEC (ref 24.5–35.1)
APTT: 69.3 SEC (ref 24.5–35.1)
APTT: 96.1 SEC (ref 24.5–35.1)
BUN BLDV-MCNC: 14 MG/DL (ref 6–20)
CALCIUM SERPL-MCNC: 8.9 MG/DL (ref 8.6–10.2)
CHLORIDE BLD-SCNC: 109 MMOL/L (ref 98–107)
CO2: 18 MMOL/L (ref 22–29)
CREAT SERPL-MCNC: 1.8 MG/DL (ref 0.7–1.2)
GFR AFRICAN AMERICAN: 47
GFR NON-AFRICAN AMERICAN: 39 ML/MIN/1.73
GLUCOSE BLD-MCNC: 98 MG/DL (ref 74–99)
INR BLD: 1.7
POTASSIUM REFLEX MAGNESIUM: 4 MMOL/L (ref 3.5–5)
PROTHROMBIN TIME: 18.5 SEC (ref 9.3–12.4)
SODIUM BLD-SCNC: 142 MMOL/L (ref 132–146)

## 2022-08-26 PROCEDURE — 6360000002 HC RX W HCPCS: Performed by: STUDENT IN AN ORGANIZED HEALTH CARE EDUCATION/TRAINING PROGRAM

## 2022-08-26 PROCEDURE — 6370000000 HC RX 637 (ALT 250 FOR IP): Performed by: INTERNAL MEDICINE

## 2022-08-26 PROCEDURE — 6370000000 HC RX 637 (ALT 250 FOR IP): Performed by: STUDENT IN AN ORGANIZED HEALTH CARE EDUCATION/TRAINING PROGRAM

## 2022-08-26 PROCEDURE — 85730 THROMBOPLASTIN TIME PARTIAL: CPT

## 2022-08-26 PROCEDURE — 6360000002 HC RX W HCPCS

## 2022-08-26 PROCEDURE — 2580000003 HC RX 258: Performed by: INTERNAL MEDICINE

## 2022-08-26 PROCEDURE — 6370000000 HC RX 637 (ALT 250 FOR IP)

## 2022-08-26 PROCEDURE — 99233 SBSQ HOSP IP/OBS HIGH 50: CPT | Performed by: INTERNAL MEDICINE

## 2022-08-26 PROCEDURE — 36415 COLL VENOUS BLD VENIPUNCTURE: CPT

## 2022-08-26 PROCEDURE — 2140000000 HC CCU INTERMEDIATE R&B

## 2022-08-26 PROCEDURE — 2580000003 HC RX 258

## 2022-08-26 PROCEDURE — 85610 PROTHROMBIN TIME: CPT

## 2022-08-26 PROCEDURE — 80048 BASIC METABOLIC PNL TOTAL CA: CPT

## 2022-08-26 RX ORDER — CHLORDIAZEPOXIDE HYDROCHLORIDE 5 MG/1
5 CAPSULE, GELATIN COATED ORAL 2 TIMES DAILY PRN
Status: DISCONTINUED | OUTPATIENT
Start: 2022-08-26 | End: 2022-08-28

## 2022-08-26 RX ORDER — ASPIRIN 81 MG/1
81 TABLET ORAL DAILY
Status: DISCONTINUED | OUTPATIENT
Start: 2022-08-26 | End: 2022-08-29 | Stop reason: HOSPADM

## 2022-08-26 RX ADMIN — SUCRALFATE 1 G: 1 TABLET ORAL at 23:51

## 2022-08-26 RX ADMIN — TRAZODONE HYDROCHLORIDE 50 MG: 50 TABLET ORAL at 23:51

## 2022-08-26 RX ADMIN — HYDROMORPHONE HYDROCHLORIDE 1 MG: 1 INJECTION, SOLUTION INTRAMUSCULAR; INTRAVENOUS; SUBCUTANEOUS at 23:51

## 2022-08-26 RX ADMIN — OXYCODONE AND ACETAMINOPHEN 1 TABLET: 5; 325 TABLET ORAL at 05:17

## 2022-08-26 RX ADMIN — WARFARIN SODIUM 5 MG: 5 TABLET ORAL at 18:16

## 2022-08-26 RX ADMIN — PANTOPRAZOLE SODIUM 40 MG: 40 TABLET, DELAYED RELEASE ORAL at 16:16

## 2022-08-26 RX ADMIN — HYDROMORPHONE HYDROCHLORIDE 1 MG: 1 INJECTION, SOLUTION INTRAMUSCULAR; INTRAVENOUS; SUBCUTANEOUS at 20:03

## 2022-08-26 RX ADMIN — PIPERACILLIN AND TAZOBACTAM 3375 MG: 3; .375 INJECTION, POWDER, LYOPHILIZED, FOR SOLUTION INTRAVENOUS at 04:13

## 2022-08-26 RX ADMIN — SUCRALFATE 1 G: 1 TABLET ORAL at 05:17

## 2022-08-26 RX ADMIN — PANTOPRAZOLE SODIUM 40 MG: 40 TABLET, DELAYED RELEASE ORAL at 05:17

## 2022-08-26 RX ADMIN — HYDROMORPHONE HYDROCHLORIDE 1 MG: 1 INJECTION, SOLUTION INTRAMUSCULAR; INTRAVENOUS; SUBCUTANEOUS at 12:30

## 2022-08-26 RX ADMIN — OXYCODONE AND ACETAMINOPHEN 1 TABLET: 5; 325 TABLET ORAL at 09:57

## 2022-08-26 RX ADMIN — ASPIRIN 81 MG: 81 TABLET, COATED ORAL at 14:29

## 2022-08-26 RX ADMIN — NICOTINE POLACRILEX 2 MG: 2 LOZENGE ORAL at 20:12

## 2022-08-26 RX ADMIN — Medication 10 ML: at 09:56

## 2022-08-26 RX ADMIN — SUCRALFATE 1 G: 1 TABLET ORAL at 18:16

## 2022-08-26 RX ADMIN — HYDROMORPHONE HYDROCHLORIDE 1 MG: 1 INJECTION, SOLUTION INTRAMUSCULAR; INTRAVENOUS; SUBCUTANEOUS at 16:20

## 2022-08-26 RX ADMIN — HEPARIN SODIUM 8.8 UNITS/KG/HR: 10000 INJECTION, SOLUTION INTRAVENOUS at 16:20

## 2022-08-26 RX ADMIN — NICOTINE POLACRILEX 2 MG: 2 LOZENGE ORAL at 18:17

## 2022-08-26 RX ADMIN — HYDROMORPHONE HYDROCHLORIDE 1 MG: 1 INJECTION, SOLUTION INTRAMUSCULAR; INTRAVENOUS; SUBCUTANEOUS at 02:47

## 2022-08-26 RX ADMIN — CHLORDIAZEPOXIDE HYDROCHLORIDE 5 MG: 5 CAPSULE ORAL at 09:54

## 2022-08-26 RX ADMIN — NICOTINE POLACRILEX 2 MG: 2 LOZENGE ORAL at 05:20

## 2022-08-26 RX ADMIN — SUCRALFATE 1 G: 1 TABLET ORAL at 12:30

## 2022-08-26 RX ADMIN — METOPROLOL SUCCINATE 25 MG: 25 TABLET, EXTENDED RELEASE ORAL at 09:54

## 2022-08-26 ASSESSMENT — PAIN DESCRIPTION - LOCATION
LOCATION: BACK;FLANK
LOCATION: BACK
LOCATION: ABDOMEN;FLANK
LOCATION: BACK
LOCATION: ABDOMEN;FLANK

## 2022-08-26 ASSESSMENT — PAIN SCALES - GENERAL
PAINLEVEL_OUTOF10: 8
PAINLEVEL_OUTOF10: 10
PAINLEVEL_OUTOF10: 8

## 2022-08-26 ASSESSMENT — PAIN DESCRIPTION - DESCRIPTORS
DESCRIPTORS: ACHING;JABBING
DESCRIPTORS: ACHING;DISCOMFORT
DESCRIPTORS: ACHING;JABBING
DESCRIPTORS: ACHING
DESCRIPTORS: ACHING;THROBBING;STABBING

## 2022-08-26 ASSESSMENT — PAIN DESCRIPTION - ORIENTATION
ORIENTATION: RIGHT
ORIENTATION: LOWER;POSTERIOR;RIGHT
ORIENTATION: RIGHT;MID

## 2022-08-26 ASSESSMENT — PAIN SCALES - WONG BAKER: WONGBAKER_NUMERICALRESPONSE: 0

## 2022-08-26 NOTE — PROGRESS NOTES
Hospitalist Progress Note      Synopsis: Patient admitted on 8/22/2022     Subjective  Patient seen at bedside today. Still complains of abdominal pain mildly improved. HIDA scan negative. Surgery signed off no concern for acute cholecystitis. Exam:  /84   Pulse 67   Temp 98.1 °F (36.7 °C) (Oral)   Resp 18   Ht 5' 9\" (1.753 m)   Wt 249 lb 1.9 oz (113 kg)   SpO2 95%   BMI 36.79 kg/m²   General appearance: No apparent distress, appears stated age and cooperative. HEENT: Pupils equal, round, and reactive to light. Conjunctivae/corneas clear. Neck: Supple. No jugular venous distention. Respiratory:  Normal respiratory effort. Clear to auscultation, bilaterally without Rales/Wheezes/Rhonchi. Cardiovascular: Regular rate and rhythm with normal S1/S2 without murmurs, rubs or gallops. Abdomen: Soft, right-sided tenderness  Musculoskeletal: No clubbing, cyanosis or edema bilaterally.    Skin:  No rashes    Neurologic: awake, alert and following commands     Medications:  Reviewed    Infusion Medications    sodium chloride      heparin (PORCINE) Infusion 10.8 Units/kg/hr (08/25/22 3633)    sodium chloride       Scheduled Medications    chlordiazePOXIDE  5 mg Oral TID    sucralfate  1 g Oral 4 times per day    pantoprazole  40 mg Oral BID AC    warfarin  5 mg Oral Daily    sodium chloride flush  5-40 mL IntraVENous 2 times per day    metoprolol succinate  25 mg Oral Daily     PRN Meds: sodium chloride flush, sodium chloride, ondansetron **OR** ondansetron, polyethylene glycol, acetaminophen **OR** acetaminophen, heparin (porcine), heparin (porcine), nicotine polacrilex, sodium chloride, oxyCODONE-acetaminophen, HYDROmorphone, traZODone    I/O    Intake/Output Summary (Last 24 hours) at 8/26/2022 0951  Last data filed at 8/26/2022 0817  Gross per 24 hour   Intake --   Output 400 ml   Net -400 ml       Labs:   Recent Labs     08/24/22  0816 08/25/22  0444   WBC 6.0 5.9   HGB 13.4 11.8*   HCT 41.1 likely not due to acute cholecystitis. Carafate, Protonix. Okay for discharge from surgery perspective. Patient on Coumadin with heparin bridge. INR today 1.7. We will discharge when therapeutic. Pain control. Repeat BMP for electrolyte abnormality. Diet: ADULT DIET; Regular  Code Status: Full Code  PT/OT Eval Status:    N/A  DVT Prophylaxis:   On heparin gtt. Recommended disposition at discharge: Likely home    +++++++++++++++++++++++++++++++++++++++++++++++++  Cholo Negrete MD   [facility].  +++++++++++++++++++++++++++++++++++++++++++++++++  NOTE: This report was transcribed using voice recognition software. Every effort was made to ensure accuracy; however, inadvertent computerized transcription errors may be present.

## 2022-08-26 NOTE — PROGRESS NOTES
[facility]) Physicians        CARDIOLOGY                 INPATIENT PROGRESS NOTE          PATIENT SEEN IN FOLLOW UP FOR:  Elevated troponin, cardiac clearance EGD/colonoscopy    Hospital Day: 5     Vinny De Jesus is a 65year old patient known to Dr. Therisa Hatchet: Denies any CP or SOB, No orthopnea; c/o occ RUQ pain radiating go lright leg;    ROS: Review of rest of 10 systems negative except as mentioned above    OBJECTIVE: No acute distress. See Assessment     Diagnostics:       Telemetry: Reviewed           Intake/Output Summary (Last 24 hours) at 8/26/2022 1408  Last data filed at 8/26/2022 0956  Gross per 24 hour   Intake 560 ml   Output 200 ml   Net 360 ml       Labs:   CBC:   Recent Labs     08/24/22  0816 08/25/22  0444   WBC 6.0 5.9   HGB 13.4 11.8*   HCT 41.1 36.5*    105*     BMP:   Recent Labs     08/25/22  0444 08/26/22  1006    142   K 3.3* 4.0   CO2 23 18*   BUN 13 14   CREATININE 2.0* 1.8*   LABGLOM 35 39   CALCIUM 8.2* 8.9     Mag:   Recent Labs     08/24/22  0816 08/25/22  0444   MG 1.9 1.7     Phos: No results for input(s): PHOS in the last 72 hours. TSH: No results for input(s): TSH in the last 72 hours. HgA1c:     BNP: No results for input(s): BNP in the last 72 hours. PT/INR:   Recent Labs     08/24/22  0816 08/26/22 0448   PROTIME 18.4* 18.5*   INR 1.7 1.7     APTT:  Recent Labs     08/26/22  0448 08/26/22  0820   APTT 120.8* 96.1*     CARDIAC ENZYMES:No results for input(s): CKTOTAL, CKMB, CKMBINDEX, TROPONINI in the last 72 hours.   FASTING LIPID PANEL:  Lab Results   Component Value Date/Time    CHOL 86 03/24/2022 05:31 AM    HDL 28 03/24/2022 05:31 AM    LDLCALC 28 03/24/2022 05:31 AM    TRIG 149 03/24/2022 05:31 AM     LIVER PROFILE:  Recent Labs     08/24/22  0816   AST 52*   ALT 43*   LABALBU 4.0       Current Inpatient Medications:   sucralfate  1 g Oral 4 times per day    pantoprazole  40 mg Oral BID AC    warfarin  5 mg Oral Daily    sodium chloride flush  5-40 mL IntraVENous 2 times per day    metoprolol succinate  25 mg Oral Daily       IV Infusions (if any):   sodium chloride      heparin (PORCINE) Infusion 8.8 Units/kg/hr (22 0952)    sodium chloride           PHYSICAL EXAM:     CONSTITUTIONAL:   /84   Pulse 67   Temp 98.1 °F (36.7 °C) (Oral)   Resp 18   Ht 5' 9\" (1.753 m)   Wt 249 lb 1.9 oz (113 kg)   SpO2 95%   BMI 36.79 kg/m²   Pulse  Av  Min: 57  Max: 84  Systolic (47OYV), XDD:680 , Min:109 , LSH:360    Diastolic (02SOH), VJR:07, Min:69, Max:84    CONST:  Well developed, appears stated age. Awake, alert and no apparent distress. Sitting in chair  HEENT:   Head- Normocephalic  Eyes- Conjunctivae pink, no icterus  Throat- Oral mucosa moist  Neck-  No stridor, no jugular venous distention. No carotid bruit. CHEST: Chest symmetrical and non-tender to palpation. No accessory muscle use  RESPIRATORY: Lung sounds - Few rhonchi  CARDIOVASCULAR:     Heart Inspection-Pacer site OK  Heart Ausculation- Regular rate and rhythm, Mechanical S1 and S2 sounds; 2/6 systolic murmur. No s3 or rub   EXT: + lower extremity edema. Distal pulses palpable, no cyanosis   ABDOMEN: Soft, non-tender to light palpation. Obese, Bowel sounds present. No abdominal bruit  MS: n/a   : Deferred  RECTAL: Deferred  SKIN: Warm and dry   NEURO / PSYCH: Oriented to person, place; Good mood and affect. IMPRESSION / RECOMMENDATIONS:     Abdominal pain - Seen by surgery, stable     Preoperative cardiac clearance - Cleared from cardiac stand point     Elevated Troponin  - Flat pattern, No CP, chronic elevation. EKG reviewed     Mechanical Heart valves - AVR (#23 [employer] bileaflet mechanical) and MVR (#29 [employer] bileaflet mechanical valve) 2020. Continue Coumadin with Heparin till INR >2.5     CAD s/p CABG x1 with a LIMA to the LAD in . Non-ischemic Lexiscan 2022  - Continue BB; add ASA 81mg.  Add statin prior to discharge     FRANTZ - Improving, Monitor renal Fn     Edema feet - Elevate feet, If gets worse start diuretics    Alcohol use - Counseled to quit drinking      S/p Pacemaker for post-op CHB - [employer] - Normal Fn     HTN - Controlled, Monitor BP      Noncompliant with meds and testing - Compliance with meds, diet, pacer check-ups and INR testing discussed                 Cardiology will follow as needed during weekend     No family at bed side  Above recommendations discussed with him     F/u by Dr King Hernandez 2-4 weeks after discharge    Electronically signed by Prasad Tong MD on 8/26/2022 at [facility] Cardiology

## 2022-08-27 LAB
APTT: 50.6 SEC (ref 24.5–35.1)
INR BLD: 1.7
PROTHROMBIN TIME: 18.1 SEC (ref 9.3–12.4)

## 2022-08-27 PROCEDURE — 85730 THROMBOPLASTIN TIME PARTIAL: CPT

## 2022-08-27 PROCEDURE — 6360000002 HC RX W HCPCS: Performed by: STUDENT IN AN ORGANIZED HEALTH CARE EDUCATION/TRAINING PROGRAM

## 2022-08-27 PROCEDURE — 6370000000 HC RX 637 (ALT 250 FOR IP): Performed by: STUDENT IN AN ORGANIZED HEALTH CARE EDUCATION/TRAINING PROGRAM

## 2022-08-27 PROCEDURE — 85610 PROTHROMBIN TIME: CPT

## 2022-08-27 PROCEDURE — 2580000003 HC RX 258: Performed by: INTERNAL MEDICINE

## 2022-08-27 PROCEDURE — 2140000000 HC CCU INTERMEDIATE R&B

## 2022-08-27 PROCEDURE — 6370000000 HC RX 637 (ALT 250 FOR IP): Performed by: INTERNAL MEDICINE

## 2022-08-27 PROCEDURE — 36415 COLL VENOUS BLD VENIPUNCTURE: CPT

## 2022-08-27 PROCEDURE — 6370000000 HC RX 637 (ALT 250 FOR IP)

## 2022-08-27 PROCEDURE — 6360000002 HC RX W HCPCS

## 2022-08-27 RX ADMIN — SUCRALFATE 1 G: 1 TABLET ORAL at 05:47

## 2022-08-27 RX ADMIN — TRAZODONE HYDROCHLORIDE 50 MG: 50 TABLET ORAL at 22:33

## 2022-08-27 RX ADMIN — HYDROMORPHONE HYDROCHLORIDE 1 MG: 1 INJECTION, SOLUTION INTRAMUSCULAR; INTRAVENOUS; SUBCUTANEOUS at 03:23

## 2022-08-27 RX ADMIN — ASPIRIN 81 MG: 81 TABLET, COATED ORAL at 08:37

## 2022-08-27 RX ADMIN — PANTOPRAZOLE SODIUM 40 MG: 40 TABLET, DELAYED RELEASE ORAL at 05:47

## 2022-08-27 RX ADMIN — HYDROMORPHONE HYDROCHLORIDE 1 MG: 1 INJECTION, SOLUTION INTRAMUSCULAR; INTRAVENOUS; SUBCUTANEOUS at 22:24

## 2022-08-27 RX ADMIN — NICOTINE POLACRILEX 2 MG: 2 LOZENGE ORAL at 22:24

## 2022-08-27 RX ADMIN — OXYCODONE AND ACETAMINOPHEN 1 TABLET: 5; 325 TABLET ORAL at 05:50

## 2022-08-27 RX ADMIN — HEPARIN SODIUM 8.8 UNITS/KG/HR: 10000 INJECTION, SOLUTION INTRAVENOUS at 18:13

## 2022-08-27 RX ADMIN — SUCRALFATE 1 G: 1 TABLET ORAL at 12:18

## 2022-08-27 RX ADMIN — NICOTINE POLACRILEX 2 MG: 2 LOZENGE ORAL at 10:12

## 2022-08-27 RX ADMIN — WARFARIN SODIUM 5 MG: 5 TABLET ORAL at 18:08

## 2022-08-27 RX ADMIN — HYDROMORPHONE HYDROCHLORIDE 1 MG: 1 INJECTION, SOLUTION INTRAMUSCULAR; INTRAVENOUS; SUBCUTANEOUS at 12:18

## 2022-08-27 RX ADMIN — PANTOPRAZOLE SODIUM 40 MG: 40 TABLET, DELAYED RELEASE ORAL at 15:41

## 2022-08-27 RX ADMIN — HYDROMORPHONE HYDROCHLORIDE 1 MG: 1 INJECTION, SOLUTION INTRAMUSCULAR; INTRAVENOUS; SUBCUTANEOUS at 15:40

## 2022-08-27 RX ADMIN — SUCRALFATE 1 G: 1 TABLET ORAL at 18:08

## 2022-08-27 RX ADMIN — SUCRALFATE 1 G: 1 TABLET ORAL at 22:24

## 2022-08-27 RX ADMIN — Medication 10 ML: at 21:54

## 2022-08-27 RX ADMIN — NICOTINE POLACRILEX 2 MG: 2 LOZENGE ORAL at 19:21

## 2022-08-27 RX ADMIN — HYDROMORPHONE HYDROCHLORIDE 1 MG: 1 INJECTION, SOLUTION INTRAMUSCULAR; INTRAVENOUS; SUBCUTANEOUS at 08:37

## 2022-08-27 RX ADMIN — METOPROLOL SUCCINATE 25 MG: 25 TABLET, EXTENDED RELEASE ORAL at 08:37

## 2022-08-27 RX ADMIN — Medication 10 ML: at 08:37

## 2022-08-27 RX ADMIN — HYDROMORPHONE HYDROCHLORIDE 1 MG: 1 INJECTION, SOLUTION INTRAMUSCULAR; INTRAVENOUS; SUBCUTANEOUS at 19:21

## 2022-08-27 ASSESSMENT — PAIN SCALES - GENERAL
PAINLEVEL_OUTOF10: 9
PAINLEVEL_OUTOF10: 8

## 2022-08-27 ASSESSMENT — PAIN DESCRIPTION - ORIENTATION
ORIENTATION: RIGHT;LOWER
ORIENTATION: RIGHT;MID
ORIENTATION: RIGHT;MID;LOWER

## 2022-08-27 ASSESSMENT — PAIN DESCRIPTION - LOCATION
LOCATION: FLANK;BACK
LOCATION: BACK;FLANK
LOCATION: FLANK;BACK

## 2022-08-27 ASSESSMENT — PAIN DESCRIPTION - DESCRIPTORS
DESCRIPTORS: ACHING;DISCOMFORT;STABBING
DESCRIPTORS: ACHING;SHOOTING;DISCOMFORT
DESCRIPTORS: ACHING;POUNDING;THROBBING

## 2022-08-27 NOTE — PROGRESS NOTES
Hospitalist Progress Note      Synopsis: Patient admitted on 8/22/2022     Subjective  Patient seen at bedside today. Still complains of abdominal pain mildly improved. Exam:  BP 97/68   Pulse 64   Temp 97.8 °F (36.6 °C) (Oral)   Resp 18   Ht 5' 9\" (1.753 m)   Wt 249 lb 1.9 oz (113 kg)   SpO2 99%   BMI 36.79 kg/m²   General appearance: No apparent distress, appears stated age and cooperative. HEENT: Pupils equal, round, and reactive to light. Conjunctivae/corneas clear. Neck: Supple. No jugular venous distention. Respiratory:  Normal respiratory effort. Clear to auscultation, bilaterally without Rales/Wheezes/Rhonchi. Cardiovascular: Regular rate and rhythm with normal S1/S2 without murmurs, rubs or gallops. Abdomen: Soft, right-sided tenderness  Musculoskeletal: No clubbing, cyanosis or edema bilaterally.    Skin:  No rashes    Neurologic: awake, alert and following commands     Medications:  Reviewed    Infusion Medications    sodium chloride      heparin (PORCINE) Infusion 8.8 Units/kg/hr (08/27/22 0915)    sodium chloride       Scheduled Medications    aspirin  81 mg Oral Daily    sucralfate  1 g Oral 4 times per day    pantoprazole  40 mg Oral BID AC    warfarin  5 mg Oral Daily    sodium chloride flush  5-40 mL IntraVENous 2 times per day    metoprolol succinate  25 mg Oral Daily     PRN Meds: chlordiazePOXIDE, sodium chloride flush, sodium chloride, ondansetron **OR** ondansetron, polyethylene glycol, acetaminophen **OR** acetaminophen, heparin (porcine), heparin (porcine), nicotine polacrilex, sodium chloride, oxyCODONE-acetaminophen, HYDROmorphone, traZODone    I/O    Intake/Output Summary (Last 24 hours) at 8/27/2022 0927  Last data filed at 8/27/2022 0743  Gross per 24 hour   Intake 1100 ml   Output 550 ml   Net 550 ml       Labs:   Recent Labs     08/25/22 0444   WBC 5.9   HGB 11.8*   HCT 36.5*   *       Recent Labs     08/25/22 0444 08/26/22  1006    142   K 3.3* 4.0  109*   CO2 23 18*   BUN 13 14   CREATININE 2.0* 1.8*   CALCIUM 8.2* 8.9       No results for input(s): PROT, ALB, ALKPHOS, ALT, AST, BILITOT, AMYLASE, LIPASE in the last 72 hours. Recent Labs     08/26/22  0448 08/27/22  0818   INR 1.7 1.7       No results for input(s): Hardy Lynne in the last 72 hours. Chronic labs:  Lab Results   Component Value Date    CHOL 86 03/24/2022    TRIG 149 03/24/2022    HDL 28 03/24/2022    LDLCALC 28 03/24/2022    TSH 1.990 05/12/2022    INR 1.7 08/27/2022    LABA1C 5.2 03/24/2022       Radiology:  Imaging studies reviewed today. ASSESSMENT:    Principal Problem:    Acute cholecystitis    Problem list  Pacemaker in situ  History of aortic and mitral valve replacements initially on Coumadin  Alcohol abuse  Supratherapeutic INR  History of retroperitoneal hemorrhage  CKD  Essential hypertension  Hyperlipidemia     PLAN:  Continue inpatient admission. Continue heparin for now. Cardiology recommendations noted. On clear liquid diet for EGD colonoscopy tomorrow. Surgery on board. Continue IV antibiotics  Monitor electrolytes and correct as needed. Continue Librium for now.    8/24/2022  For EGD colonoscopy today. Heparin held prior to procedure we will resume Coumadin and bridged with heparin to INR greater than 2.5..  IV antibiotics. Monitor electrolytes closely. Surgery on board follow recommendation. Continue management as listed above. 8/25/2022  Had EGD/ colonoscopy with polypectomy done yesterday  Postop diagnosis:Gastritis; Duodenitis; Internal hemorrhoids, Transverse polyps x2; Descending polyps x2. Will resume Coumadin since okay with surgery and bridged with heparin. On IV antibiotics  Pain management  Potassium 3.3 today. We will give 40 mEq p.o. potassium. Monitor BMP. Follow-up HIDA scan.       8/26/2022  HIDA scan negative. Clinical presentation likely not due to acute cholecystitis. Carafate, Protonix.   Okay for discharge from

## 2022-08-28 LAB
APTT: 51.5 SEC (ref 24.5–35.1)
INR BLD: 1.7
PROTHROMBIN TIME: 18.1 SEC (ref 9.3–12.4)

## 2022-08-28 PROCEDURE — 36415 COLL VENOUS BLD VENIPUNCTURE: CPT

## 2022-08-28 PROCEDURE — 6370000000 HC RX 637 (ALT 250 FOR IP): Performed by: STUDENT IN AN ORGANIZED HEALTH CARE EDUCATION/TRAINING PROGRAM

## 2022-08-28 PROCEDURE — 6370000000 HC RX 637 (ALT 250 FOR IP): Performed by: INTERNAL MEDICINE

## 2022-08-28 PROCEDURE — 85730 THROMBOPLASTIN TIME PARTIAL: CPT

## 2022-08-28 PROCEDURE — 6370000000 HC RX 637 (ALT 250 FOR IP)

## 2022-08-28 PROCEDURE — 6360000002 HC RX W HCPCS

## 2022-08-28 PROCEDURE — 85610 PROTHROMBIN TIME: CPT

## 2022-08-28 PROCEDURE — 6360000002 HC RX W HCPCS: Performed by: STUDENT IN AN ORGANIZED HEALTH CARE EDUCATION/TRAINING PROGRAM

## 2022-08-28 PROCEDURE — 2140000000 HC CCU INTERMEDIATE R&B

## 2022-08-28 RX ORDER — WARFARIN SODIUM 5 MG/1
5 TABLET ORAL
Status: COMPLETED | OUTPATIENT
Start: 2022-08-28 | End: 2022-08-28

## 2022-08-28 RX ADMIN — NICOTINE POLACRILEX 2 MG: 2 LOZENGE ORAL at 16:41

## 2022-08-28 RX ADMIN — NICOTINE POLACRILEX 2 MG: 2 LOZENGE ORAL at 09:22

## 2022-08-28 RX ADMIN — OXYCODONE AND ACETAMINOPHEN 1 TABLET: 5; 325 TABLET ORAL at 09:24

## 2022-08-28 RX ADMIN — WARFARIN SODIUM 5 MG: 5 TABLET ORAL at 16:42

## 2022-08-28 RX ADMIN — OXYCODONE AND ACETAMINOPHEN 1 TABLET: 5; 325 TABLET ORAL at 22:34

## 2022-08-28 RX ADMIN — SUCRALFATE 1 G: 1 TABLET ORAL at 22:34

## 2022-08-28 RX ADMIN — PANTOPRAZOLE SODIUM 40 MG: 40 TABLET, DELAYED RELEASE ORAL at 16:41

## 2022-08-28 RX ADMIN — ASPIRIN 81 MG: 81 TABLET, COATED ORAL at 09:22

## 2022-08-28 RX ADMIN — HYDROMORPHONE HYDROCHLORIDE 1 MG: 1 INJECTION, SOLUTION INTRAMUSCULAR; INTRAVENOUS; SUBCUTANEOUS at 04:24

## 2022-08-28 RX ADMIN — SUCRALFATE 1 G: 1 TABLET ORAL at 16:40

## 2022-08-28 RX ADMIN — SUCRALFATE 1 G: 1 TABLET ORAL at 04:25

## 2022-08-28 RX ADMIN — NICOTINE POLACRILEX 2 MG: 2 LOZENGE ORAL at 03:15

## 2022-08-28 RX ADMIN — PANTOPRAZOLE SODIUM 40 MG: 40 TABLET, DELAYED RELEASE ORAL at 04:25

## 2022-08-28 RX ADMIN — OXYCODONE AND ACETAMINOPHEN 1 TABLET: 5; 325 TABLET ORAL at 13:55

## 2022-08-28 RX ADMIN — WARFARIN SODIUM 5 MG: 5 TABLET ORAL at 16:40

## 2022-08-28 RX ADMIN — METOPROLOL SUCCINATE 25 MG: 25 TABLET, EXTENDED RELEASE ORAL at 09:22

## 2022-08-28 RX ADMIN — TRAZODONE HYDROCHLORIDE 50 MG: 50 TABLET ORAL at 22:34

## 2022-08-28 RX ADMIN — HEPARIN SODIUM 8.8 UNITS/KG/HR: 10000 INJECTION, SOLUTION INTRAVENOUS at 16:50

## 2022-08-28 RX ADMIN — OXYCODONE AND ACETAMINOPHEN 1 TABLET: 5; 325 TABLET ORAL at 03:12

## 2022-08-28 RX ADMIN — OXYCODONE AND ACETAMINOPHEN 1 TABLET: 5; 325 TABLET ORAL at 18:24

## 2022-08-28 RX ADMIN — SUCRALFATE 1 G: 1 TABLET ORAL at 13:50

## 2022-08-28 ASSESSMENT — PAIN - FUNCTIONAL ASSESSMENT: PAIN_FUNCTIONAL_ASSESSMENT: PREVENTS OR INTERFERES WITH ALL ACTIVE AND SOME PASSIVE ACTIVITIES

## 2022-08-28 ASSESSMENT — PAIN DESCRIPTION - LOCATION
LOCATION: ABDOMEN;FLANK
LOCATION: ABDOMEN;FLANK
LOCATION: FLANK
LOCATION: OTHER (COMMENT)

## 2022-08-28 ASSESSMENT — PAIN DESCRIPTION - ORIENTATION
ORIENTATION: OTHER (COMMENT)
ORIENTATION: RIGHT

## 2022-08-28 ASSESSMENT — PAIN SCALES - GENERAL
PAINLEVEL_OUTOF10: 9
PAINLEVEL_OUTOF10: 8
PAINLEVEL_OUTOF10: 6
PAINLEVEL_OUTOF10: 8
PAINLEVEL_OUTOF10: 8

## 2022-08-28 ASSESSMENT — PAIN DESCRIPTION - DESCRIPTORS
DESCRIPTORS: SHOOTING;THROBBING;SHARP
DESCRIPTORS: OTHER (COMMENT)
DESCRIPTORS: SQUEEZING;THROBBING;STABBING
DESCRIPTORS: ACHING

## 2022-08-28 NOTE — PROGRESS NOTES
Hospitalist Progress Note      Synopsis: Patient admitted on 8/22/2022     Subjective  Patient seen at bedside. Exam:  /79   Pulse 67   Temp 98.1 °F (36.7 °C) (Oral)   Resp 18   Ht 5' 9\" (1.753 m)   Wt 249 lb 1.9 oz (113 kg)   SpO2 99%   BMI 36.79 kg/m²   General appearance: No apparent distress, appears stated age and cooperative. HEENT: Pupils equal, round, and reactive to light. Conjunctivae/corneas clear. Neck: Supple. No jugular venous distention. Respiratory:  Normal respiratory effort. Clear to auscultation, bilaterally without Rales/Wheezes/Rhonchi. Cardiovascular: Regular rate and rhythm with normal S1/S2 without murmurs, rubs or gallops. Abdomen: Soft, right-sided tenderness  Musculoskeletal: No clubbing, cyanosis or edema bilaterally. Skin:  No rashes    Neurologic: awake, alert and following commands     Medications:  Reviewed    Infusion Medications    sodium chloride      heparin (PORCINE) Infusion 8.8 Units/kg/hr (08/27/22 1813)    sodium chloride       Scheduled Medications    aspirin  81 mg Oral Daily    sucralfate  1 g Oral 4 times per day    pantoprazole  40 mg Oral BID AC    warfarin  5 mg Oral Daily    sodium chloride flush  5-40 mL IntraVENous 2 times per day    metoprolol succinate  25 mg Oral Daily     PRN Meds: sodium chloride flush, sodium chloride, ondansetron **OR** ondansetron, polyethylene glycol, acetaminophen **OR** acetaminophen, heparin (porcine), heparin (porcine), nicotine polacrilex, sodium chloride, oxyCODONE-acetaminophen, traZODone    I/O    Intake/Output Summary (Last 24 hours) at 8/28/2022 0752  Last data filed at 8/28/2022 0612  Gross per 24 hour   Intake 240 ml   Output 525 ml   Net -285 ml       Labs:   No results for input(s): WBC, HGB, HCT, PLT in the last 72 hours.       Recent Labs     08/26/22  1006      K 4.0   *   CO2 18*   BUN 14   CREATININE 1.8*   CALCIUM 8.9       No results for input(s): PROT, ALB, ALKPHOS, ALT, AST, BILITOT, AMYLASE, LIPASE in the last 72 hours. Recent Labs     08/26/22  0448 08/27/22  0818 08/28/22  0453   INR 1.7 1.7 1.7       No results for input(s): Caitlyn Laws in the last 72 hours. Chronic labs:  Lab Results   Component Value Date    CHOL 86 03/24/2022    TRIG 149 03/24/2022    HDL 28 03/24/2022    LDLCALC 28 03/24/2022    TSH 1.990 05/12/2022    INR 1.7 08/28/2022    LABA1C 5.2 03/24/2022       Radiology:  Imaging studies reviewed today. ASSESSMENT:    Principal Problem:    Acute cholecystitis    Problem list  Pacemaker in situ  History of aortic and mitral valve replacements initially on Coumadin  Alcohol abuse  Supratherapeutic INR  History of retroperitoneal hemorrhage  CKD  Essential hypertension  Hyperlipidemia     PLAN:  Continue inpatient admission. Continue heparin for now. Cardiology recommendations noted. On clear liquid diet for EGD colonoscopy tomorrow. Surgery on board. Continue IV antibiotics  Monitor electrolytes and correct as needed. Continue Librium for now.    8/24/2022  For EGD colonoscopy today. Heparin held prior to procedure we will resume Coumadin and bridged with heparin to INR greater than 2.5..  IV antibiotics. Monitor electrolytes closely. Surgery on board follow recommendation. Continue management as listed above. 8/25/2022  Had EGD/ colonoscopy with polypectomy done yesterday  Postop diagnosis:Gastritis; Duodenitis; Internal hemorrhoids, Transverse polyps x2; Descending polyps x2. Will resume Coumadin since okay with surgery and bridged with heparin. On IV antibiotics  Pain management  Potassium 3.3 today. We will give 40 mEq p.o. potassium. Monitor BMP. Follow-up HIDA scan.       8/26/2022  HIDA scan negative. Clinical presentation likely not due to acute cholecystitis. Carafate, Protonix. Okay for discharge from surgery perspective. Patient on Coumadin with heparin bridge. INR today 1.7. We will discharge when therapeutic.   Pain control. Repeat BMP for electrolyte abnormality. 8/27/2022  Blood pressure borderline low this morning. Monitor vital signs closely. Will give gentle hydration  Carafate, Protonix. .  Patient on Coumadin with heparin bridge. 5 mg. INR today 1.7. We will discharge when therapeutic. Pain control. 8/28/2022  Monitor patient overnight. Pain control  Continue other management  If INR not therapeutic tomorrow patient is clinically stable will discharge with Lovenox bridge. Follow-up with primary care provider and gastroenterology outpatient      Diet: ADULT DIET; Regular  Code Status: Full Code  PT/OT Eval Status:    N/A  DVT Prophylaxis:   On heparin gtt. Recommended disposition at discharge: Likely home    +++++++++++++++++++++++++++++++++++++++++++++++++  Eliezer Block MD   [facility].  +++++++++++++++++++++++++++++++++++++++++++++++++  NOTE: This report was transcribed using voice recognition software. Every effort was made to ensure accuracy; however, inadvertent computerized transcription errors may be present.

## 2022-08-28 NOTE — PROGRESS NOTES
Pharmacy Consultation Note  (Warfarin Dosing and Monitoring)    Initial consult date: 8/28  Consulting physician: Dr. Baeza Bile    Allergies:  Patient has no known allergies. Ht Readings from Last 1 Encounters:   08/23/22 5' 9\" (1.753 m)     Wt Readings from Last 1 Encounters:   08/23/22 249 lb 1.9 oz (113 kg)         Warfarin Indication Target   INR Range Home Dose  (if applicable)   Diet/Feeding Tube   AVR and MVR 2.5-3.5 5 mg Sat and Sun  2.5 mg all other days ADULT DIET; Regular            TSH:    Lab Results   Component Value Date/Time    TSH 1.990 05/12/2022 05:21 AM        Hepatic Function Panel:                            Lab Results   Component Value Date/Time    [facility] 128 08/24/2022 08:16 AM    ALT 43 08/24/2022 08:16 AM    AST 52 08/24/2022 08:16 AM    PROT 7.0 08/24/2022 08:16 AM    BILITOT 1.0 08/24/2022 08:16 AM    BILIDIR 0.3 08/24/2022 08:16 AM    IBILI 0.7 08/24/2022 08:16 AM    LABALBU 4.0 08/24/2022 08:16 AM       Date Warfarin Dose INR Heparin or LMWH HBG/HCT PLT Comment   8/25 5 mg x Heparin gtt 11.8/36.5 105    8/26 5mg 1.6 same x x    8/27 5 mg 1.7 same x x    8/28 5 mg 1.7  x x Pharmacy officially consulted              Assessment:  Isaac Graves is a 65 y.o. male chronically anticoagulated on warfarin 2/2 AVR and MVR with a goal INR of 2.5 - 3.5; Upon admission, INR was supratherapeutic (>10) requiring 10 mg of Phytonadione on 8/22. Phytonadione administration may result in diminished anticoagulant effects resulting in warfarin resistance. This is usually seen with Phytonadione doses of 5 - 10 mg, and resistance to warfarin may last for up to 1 week. (CHEST 1998, [address] Guide to Warfarin Therapy 2003)    Plan:  Warfarin 5 mg tonight  Daily PT/INR until the INR is stable within the therapeutic range  Pharmacist will follow and monitor/adjust dosing as necessary    Thank you for this consult. Please page with questions.   Phillip Cunha PharmD, BCPS 8/28/2022 8:11 AM

## 2022-08-29 VITALS
TEMPERATURE: 97.7 F | SYSTOLIC BLOOD PRESSURE: 116 MMHG | HEART RATE: 64 BPM | DIASTOLIC BLOOD PRESSURE: 61 MMHG | BODY MASS INDEX: 36.9 KG/M2 | OXYGEN SATURATION: 97 % | WEIGHT: 249.12 LBS | HEIGHT: 69 IN | RESPIRATION RATE: 18 BRPM

## 2022-08-29 LAB
ALBUMIN SERPL-MCNC: 3.7 G/DL (ref 3.5–5.2)
ALP BLD-CCNC: 108 U/L (ref 40–129)
ALT SERPL-CCNC: 35 U/L (ref 0–40)
ANION GAP SERPL CALCULATED.3IONS-SCNC: 11 MMOL/L (ref 7–16)
APTT: 55.1 SEC (ref 24.5–35.1)
AST SERPL-CCNC: 37 U/L (ref 0–39)
BASOPHILS ABSOLUTE: 0.03 E9/L (ref 0–0.2)
BASOPHILS RELATIVE PERCENT: 0.6 % (ref 0–2)
BILIRUB SERPL-MCNC: 0.3 MG/DL (ref 0–1.2)
BUN BLDV-MCNC: 22 MG/DL (ref 6–20)
CALCIUM SERPL-MCNC: 9.1 MG/DL (ref 8.6–10.2)
CHLORIDE BLD-SCNC: 110 MMOL/L (ref 98–107)
CO2: 18 MMOL/L (ref 22–29)
CREAT SERPL-MCNC: 1.6 MG/DL (ref 0.7–1.2)
EOSINOPHILS ABSOLUTE: 0.24 E9/L (ref 0.05–0.5)
EOSINOPHILS RELATIVE PERCENT: 4.8 % (ref 0–6)
GFR AFRICAN AMERICAN: 54
GFR NON-AFRICAN AMERICAN: 45 ML/MIN/1.73
GLUCOSE BLD-MCNC: 81 MG/DL (ref 74–99)
HCT VFR BLD CALC: 39.1 % (ref 37–54)
HEMOGLOBIN: 12.2 G/DL (ref 12.5–16.5)
IMMATURE GRANULOCYTES #: 0.02 E9/L
IMMATURE GRANULOCYTES %: 0.4 % (ref 0–5)
INR BLD: 2.1
LACTIC ACID: 1.4 MMOL/L (ref 0.5–2.2)
LYMPHOCYTES ABSOLUTE: 1.07 E9/L (ref 1.5–4)
LYMPHOCYTES RELATIVE PERCENT: 21.4 % (ref 20–42)
MCH RBC QN AUTO: 30.9 PG (ref 26–35)
MCHC RBC AUTO-ENTMCNC: 31.2 % (ref 32–34.5)
MCV RBC AUTO: 99 FL (ref 80–99.9)
MONOCYTES ABSOLUTE: 0.97 E9/L (ref 0.1–0.95)
MONOCYTES RELATIVE PERCENT: 19.4 % (ref 2–12)
NEUTROPHILS ABSOLUTE: 2.66 E9/L (ref 1.8–7.3)
NEUTROPHILS RELATIVE PERCENT: 53.4 % (ref 43–80)
PDW BLD-RTO: 15.9 FL (ref 11.5–15)
PLATELET # BLD: 101 E9/L (ref 130–450)
PMV BLD AUTO: 11.9 FL (ref 7–12)
POTASSIUM REFLEX MAGNESIUM: 4.4 MMOL/L (ref 3.5–5)
PRO-BNP: 724 PG/ML (ref 0–125)
PROTHROMBIN TIME: 22.9 SEC (ref 9.3–12.4)
RBC # BLD: 3.95 E12/L (ref 3.8–5.8)
SODIUM BLD-SCNC: 139 MMOL/L (ref 132–146)
TOTAL PROTEIN: 6.7 G/DL (ref 6.4–8.3)
WBC # BLD: 5 E9/L (ref 4.5–11.5)

## 2022-08-29 PROCEDURE — 85730 THROMBOPLASTIN TIME PARTIAL: CPT

## 2022-08-29 PROCEDURE — 83880 ASSAY OF NATRIURETIC PEPTIDE: CPT

## 2022-08-29 PROCEDURE — 6370000000 HC RX 637 (ALT 250 FOR IP): Performed by: INTERNAL MEDICINE

## 2022-08-29 PROCEDURE — 83605 ASSAY OF LACTIC ACID: CPT

## 2022-08-29 PROCEDURE — 36415 COLL VENOUS BLD VENIPUNCTURE: CPT

## 2022-08-29 PROCEDURE — 80053 COMPREHEN METABOLIC PANEL: CPT

## 2022-08-29 PROCEDURE — 85610 PROTHROMBIN TIME: CPT

## 2022-08-29 PROCEDURE — 85025 COMPLETE CBC W/AUTO DIFF WBC: CPT

## 2022-08-29 PROCEDURE — 6370000000 HC RX 637 (ALT 250 FOR IP): Performed by: STUDENT IN AN ORGANIZED HEALTH CARE EDUCATION/TRAINING PROGRAM

## 2022-08-29 RX ORDER — POLYETHYLENE GLYCOL 3350 17 G
2 POWDER IN PACKET (EA) ORAL
Qty: 100 EACH | Refills: 3 | Status: SHIPPED | OUTPATIENT
Start: 2022-08-29

## 2022-08-29 RX ORDER — WARFARIN SODIUM 2.5 MG/1
2.5 TABLET ORAL DAILY
Qty: 30 TABLET | Refills: 0 | Status: SHIPPED | OUTPATIENT
Start: 2022-08-29 | End: 2022-09-07

## 2022-08-29 RX ORDER — OXYCODONE HYDROCHLORIDE AND ACETAMINOPHEN 5; 325 MG/1; MG/1
1 TABLET ORAL EVERY 6 HOURS PRN
Qty: 12 TABLET | Refills: 0 | Status: SHIPPED | OUTPATIENT
Start: 2022-08-29 | End: 2022-09-01

## 2022-08-29 RX ORDER — SUCRALFATE 1 G/1
1 TABLET ORAL 4 TIMES DAILY
Qty: 120 TABLET | Refills: 0 | Status: SHIPPED | OUTPATIENT
Start: 2022-08-29

## 2022-08-29 RX ORDER — METOPROLOL SUCCINATE 25 MG/1
25 TABLET, EXTENDED RELEASE ORAL DAILY
Qty: 30 TABLET | Refills: 3 | Status: SHIPPED | OUTPATIENT
Start: 2022-08-30

## 2022-08-29 RX ORDER — ENOXAPARIN SODIUM 150 MG/ML
1 INJECTION SUBCUTANEOUS EVERY 12 HOURS
Qty: 30 EACH | Refills: 0 | Status: SHIPPED | OUTPATIENT
Start: 2022-08-29 | End: 2022-09-08

## 2022-08-29 RX ORDER — PANTOPRAZOLE SODIUM 40 MG/1
40 TABLET, DELAYED RELEASE ORAL
Qty: 30 TABLET | Refills: 0 | Status: SHIPPED | OUTPATIENT
Start: 2022-08-29

## 2022-08-29 RX ORDER — WARFARIN SODIUM 1 MG/1
1 TABLET ORAL
Status: DISCONTINUED | OUTPATIENT
Start: 2022-08-29 | End: 2022-08-29 | Stop reason: HOSPADM

## 2022-08-29 RX ORDER — ASPIRIN 81 MG/1
81 TABLET ORAL DAILY
Qty: 30 TABLET | Refills: 3 | Status: SHIPPED | OUTPATIENT
Start: 2022-08-30

## 2022-08-29 RX ADMIN — OXYCODONE AND ACETAMINOPHEN 1 TABLET: 5; 325 TABLET ORAL at 03:21

## 2022-08-29 RX ADMIN — PANTOPRAZOLE SODIUM 40 MG: 40 TABLET, DELAYED RELEASE ORAL at 09:20

## 2022-08-29 RX ADMIN — NICOTINE POLACRILEX 2 MG: 2 LOZENGE ORAL at 03:21

## 2022-08-29 RX ADMIN — ASPIRIN 81 MG: 81 TABLET, COATED ORAL at 09:20

## 2022-08-29 RX ADMIN — SUCRALFATE 1 G: 1 TABLET ORAL at 12:19

## 2022-08-29 RX ADMIN — NICOTINE POLACRILEX 2 MG: 2 LOZENGE ORAL at 09:28

## 2022-08-29 RX ADMIN — METOPROLOL SUCCINATE 25 MG: 25 TABLET, EXTENDED RELEASE ORAL at 09:20

## 2022-08-29 RX ADMIN — OXYCODONE AND ACETAMINOPHEN 1 TABLET: 5; 325 TABLET ORAL at 13:39

## 2022-08-29 RX ADMIN — OXYCODONE AND ACETAMINOPHEN 1 TABLET: 5; 325 TABLET ORAL at 09:19

## 2022-08-29 RX ADMIN — SUCRALFATE 1 G: 1 TABLET ORAL at 09:20

## 2022-08-29 RX ADMIN — NICOTINE POLACRILEX 2 MG: 2 LOZENGE ORAL at 13:42

## 2022-08-29 ASSESSMENT — PAIN SCALES - GENERAL
PAINLEVEL_OUTOF10: 5
PAINLEVEL_OUTOF10: 6
PAINLEVEL_OUTOF10: 4

## 2022-08-29 ASSESSMENT — PAIN DESCRIPTION - DESCRIPTORS: DESCRIPTORS: ACHING;CRAMPING;DISCOMFORT

## 2022-08-29 ASSESSMENT — PAIN DESCRIPTION - ORIENTATION: ORIENTATION: RIGHT

## 2022-08-29 ASSESSMENT — PAIN DESCRIPTION - LOCATION: LOCATION: OTHER (COMMENT);BACK

## 2022-08-29 NOTE — DISCHARGE INSTRUCTIONS
Patient has achieved maximum benefit from current admission and will be discharged home today. Vital signs, complete blood counts and comprehensive metabolic profile were at patient's baseline  Please continue warfarin 2.5 mg daily and Lovenox 120 mg twice daily until INR therapeutic. [2.5-3.5]  Patient is scheduled to follow-up with primary care provider on 9/1/2022 for INR check  Patient needs to follow-up with gastroenterology outpatient for further work-up chronic diarrhea. Will be discharged on 3-day supply of pain medications  Patient has been counseled extensively on medication compliance and dietary requirements when taking Coumadin  Go to the nearest ER for any worsening of symptoms.

## 2022-08-29 NOTE — DISCHARGE SUMMARY
Hospital Medicine Discharge Summary    Patient ID: Addison Cochran      Patient's PCP: No primary care provider on file. Admit Date: 8/22/2022     Discharge Date:   8/29/2022    Admitting Physician: Lenny Morgan MD     Discharge Physician: Bg Saavedra MD     Discharge condition: Satisfactory    Discharge Diagnoses:  Right upper quadrant pain  Alcohol abuse  Colonic polyp  Supratherapeutic INR         Active Hospital Problems    Diagnosis Date Noted    Polyp of colon [K63.5] 08/24/2022     Priority: Medium    Gastrointestinal hemorrhage [K92.2] 08/23/2022     Priority: Medium    Acute cholecystitis [K81.0] 08/22/2022     Priority: Medium       The patient was seen and examined on day of discharge and this discharge summary is in conjunction with any daily progress note from day of discharge. Hospital Course:   HPI  65 y.o. male with a hx of abdominal pain, hx of aortic and mitral valve replacement, pacemaker in situ, hx of spontaneous retroperitoneal hemorrhage in 5/2022, alcohol abuse, medical noncompliance, CAD s/p CABG, hx of CVA, CKD stage III, HTN, HLD, vitamin D deficiency who presented to [facility] with abdominal pain. Pt reports that the pain has been ongoing for the last 2 months or so and he has been self-treating with alcohol. Pain in the RUQ, sharp and shooting. Alcohol dulls the pain. It has made it difficult for him to sleep. Also describes recent melena over the last week. He has not had his INR checked despite taking warfarin for aortic and mitral valve replacement in 2020. In the ED, vitals significant for tachycardia and hypertension in the setting of pain score of 10/10. Labs significant for supratheraputic INR of > 10. Hgb 15 and elevated troponin to 73. FOBT positive. CT abdomen concerning for dilated gallbladder with cholelithiasis. Surgery consulted for concern for GI bleed and acute cholecystitis and plan on EGD/colonoscopy on 8/24.  Pt was given 10 mg ov IV vitamin K, Kcentra, IV PPI bid and started on zosyn. Dilaudid given for pain. Patient was also noted to have left-sided chest pain on exertion. Cardiology consulted recommended no further work-up. Patient was cleared for EGD. Started on clear liquid diet. Had EGD done which showed gastritis, duodenitis transverse polyps x2 and descending polyps. Biopsies were taken. For repeat colonoscopy in 3 years. Digital rectal examination made notes of large internal hemorrhoids. HIDA scan done was negative for acute gallbladder or biliary pathology. Surgery signed off due to no concerns for acute cholecystitis status. IV antibiotics were discontinued. Patient was continued on pain medications. Patient was also started on Protonix and Carafate. Anticoagulation with Coumadin and heparin bridge resumed. Patient received 5 mg for 3 days with no significant improvement in INR. Pharmacy consulted. Possible Coumadin resistance due to vitamin K received on admission for supratherapeutic INR  Patient received 1 dose of 10 mg Coumadin with repeat INR 2.1. On day of discharge patient was hemodynamically stable. Patient still having right-sided pain which at this point has been deemed to be chronic possibly due to chronic pancreatitis given alcohol history. Repeat labs done showed stable creatinine around patient's baseline 1.6, lactic acid 1.4 and CBC unremarkable. Patient was counseled extensively medication compliance while taking Coumadin and compliance with diet. After discussion with pharmacy patient will be discharged today on Coumadin 2.5 mg daily and Lovenox bridge. He will follow-up with primary care provider on 9/1/2022. Patient is to follow-up with gastroenterology outpatient and cardiology.       Exam:     /61   Pulse 64   Temp 97.7 °F (36.5 °C) (Oral)   Resp 18   Ht 5' 9\" (1.753 m)   Wt 249 lb 1.9 oz (113 kg)   SpO2 97%   BMI 36.79 kg/m²     General appearance: No apparent distress, appears stated age and cooperative. HEENT: Pupils equal, round, and reactive to light. Conjunctivae/corneas clear. Neck: Supple. No jugular venous distention. Respiratory:  Normal respiratory effort. Clear to auscultation, bilaterally without Rales/Wheezes/Rhonchi. Cardiovascular: Regular rate and rhythm with normal S1/S2 without murmurs, rubs or gallops. Abdomen: Soft, right-sided tenderness  Musculoskeletal: +1 pedal edema  Skin:  No rashes    Neurologic: awake, alert and following commands     Consults:     IP CONSULT TO GENERAL SURGERY  IP CONSULT TO INTERNAL MEDICINE  IP CONSULT TO CARDIOLOGY  IP CONSULT TO PHARMACY    Significant Diagnostic Studies:   Reviewed    Disposition: Home    Discharge Instructions/Follow-up: To follow-up with primary care provider in 3 days 9/1/2022  Please follow-up with gastroenterology and cardiology outpatient. Please follow-up with biopsy results on discharge. Patient is to repeat INR in 3 days during primary care follow-up  Patient counseled extensively on medication compliance and dietary requirements while on Coumadin. Please continue Lovenox while on Coumadin. Code Status:  Full Code     Activity: activity as tolerated    Diet: cardiac diet    Labs:  For convenience and continuity at follow-up the following most recent labs are provided:      CBC:    Lab Results   Component Value Date/Time    WBC 5.0 08/29/2022 10:09 AM    HGB 12.2 08/29/2022 10:09 AM    HCT 39.1 08/29/2022 10:09 AM     08/29/2022 10:09 AM       Renal:    Lab Results   Component Value Date/Time     08/29/2022 10:09 AM    K 4.4 08/29/2022 10:09 AM     08/29/2022 10:09 AM    CO2 18 08/29/2022 10:09 AM    BUN 22 08/29/2022 10:09 AM    CREATININE 1.6 08/29/2022 10:09 AM    CALCIUM 9.1 08/29/2022 10:09 AM       Discharge Medications:     Current Discharge Medication List             Details   oxyCODONE-acetaminophen (PERCOCET) 5-325 MG per tablet Take 1 tablet by mouth every 6 hours as needed for Pain for up to 3 days. Qty: 12 tablet, Refills: 0    Comments: Reduce doses taken as pain becomes manageable  Associated Diagnoses: Acute cholecystitis; Supratherapeutic INR; Chronic abdominal pain      nicotine polacrilex (COMMIT) 2 MG lozenge Take 1 lozenge by mouth every 2 hours as needed for Smoking cessation  Qty: 100 each, Refills: 3      sucralfate (CARAFATE) 1 GM tablet Take 1 tablet by mouth 4 times daily  Qty: 120 tablet, Refills: 0      pantoprazole (PROTONIX) 40 MG tablet Take 1 tablet by mouth 2 times daily (before meals)  Qty: 30 tablet, Refills: 0      enoxaparin (LOVENOX) 120 MG/0.8ML injection Inject 0.73 mLs into the skin in the morning and 0.73 mLs in the evening. Qty: 30 each, Refills: 0                Details   aspirin 81 MG EC tablet Take 1 tablet by mouth daily  Qty: 30 tablet, Refills: 3      metoprolol succinate (TOPROL XL) 25 MG extended release tablet Take 1 tablet by mouth daily  Qty: 30 tablet, Refills: 3      warfarin (COUMADIN) 2.5 MG tablet Take 1 tablet by mouth daily  Qty: 30 tablet, Refills: 0             Time Spent on discharge is more than 45 minutes in the examination, evaluation, counseling and review of medications and discharge plan. Signed:     Destiny Panchal MD   8/29/2022

## 2022-08-29 NOTE — PROGRESS NOTES
Comprehensive Nutrition Assessment    Type and Reason for Visit:  Initial (LOS)    Nutrition Recommendations/Plan:   Recommend and start Ensure high protein supplement daily and Gelatein supplement daily to help meet nutritional needs. Malnutrition Assessment:  Malnutrition Status: At risk for malnutrition (Comment) (08/29/22 1128)    Context:  Acute Illness     Findings of the 6 clinical characteristics of malnutrition:  Energy Intake:  75% or less of estimated energy requirements for 7 or more days  Weight Loss:  Unable to assess (d/t lack of weight history)     Body Fat Loss:  Unable to assess     Muscle Mass Loss:  Unable to assess    Fluid Accumulation:  No significant fluid accumulation     Strength:  Not Performed    Nutrition Assessment:    Patient adm w/ abd pain and loose stools ; adm w/ GI bleed and acute cholecystitis ; s/p EGD/colonoscopy on 8/24 showing gastritis and duodenitis ; hx of ETOH abuse and CKD ; will start ONS    Nutrition Related Findings:    +I&Os (+2.2 L), no edema, active BS, rounded/tender abd, loose stools, obesity ; Wound Type: None       Current Nutrition Intake & Therapies:    Average Meal Intake: 51-75%     ADULT DIET; Regular    Anthropometric Measures:  Height: 5' 9\" (175.3 cm)  Ideal Body Weight (IBW): 160 lbs (73 kg)       Current Body Weight: 249 lb (112.9 kg) (8/23, actual), 155.6 % IBW.     Current BMI (kg/m2): 36.8  Usual Body Weight:  (EMR shows past weight of 235# stated on 3/24/22)                       BMI Categories: Obese Class 2 (BMI 35.0 -39.9)    Estimated Daily Nutrient Needs:  Energy Requirements Based On: Formula  Weight Used for Energy Requirements: Current  Energy (kcal/day): 3564-1788 (REE 1952 x 1.1 SF)  Weight Used for Protein Requirements: Ideal  Protein (g/day):  (1.3-1.5g/kg IBW)  Method Used for Fluid Requirements: 1 ml/kcal  Fluid (ml/day): 8493-4713    Nutrition Diagnosis:   Inadequate oral intake related to altered GI function (2/2 abd pain and loose stools) as evidenced by poor intake prior to admission, intake 51-75%, GI abnormality, diarrhea    Nutrition Interventions:   Food and/or Nutrient Delivery: Continue Current Diet, Start Oral Nutrition Supplement  Nutrition Education/Counseling: Education not indicated  Coordination of Nutrition Care: Continue to monitor while inpatient       Goals:  Previous Goal Met: Progressing toward Goal(s)  Goals: PO intake 75% or greater, by next RD assessment       Nutrition Monitoring and Evaluation:   Behavioral-Environmental Outcomes: None Identified  Food/Nutrient Intake Outcomes: Food and Nutrient Intake, Supplement Intake  Physical Signs/Symptoms Outcomes: Biochemical Data, Chewing or Swallowing, GI Status, Diarrhea, Fluid Status or Edema, Hemodynamic Status, Meal Time Behavior, Nutrition Focused Physical Findings, Skin, Weight    Discharge Planning:     Too soon to determine     Stacie Sevilla RD, LD  Contact: [phone]

## 2022-08-29 NOTE — PROGRESS NOTES
Pharmacy Consultation Note  (Warfarin Dosing and Monitoring)    Initial consult date: 8/28  Consulting physician: Dr. Yen Merino    Allergies:  Patient has no known allergies. Ht Readings from Last 1 Encounters:   08/23/22 5' 9\" (1.753 m)     Wt Readings from Last 1 Encounters:   08/23/22 249 lb 1.9 oz (113 kg)         Warfarin Indication Target   INR Range Home Dose  (if applicable)   Diet/Feeding Tube   AVR and MVR 2.5-3.5 5 mg Sat and Sun  2.5 mg all other days ADULT DIET; Regular            TSH:    Lab Results   Component Value Date/Time    TSH 1.990 05/12/2022 05:21 AM        Hepatic Function Panel:                            Lab Results   Component Value Date/Time    [facility] 128 08/24/2022 08:16 AM    ALT 43 08/24/2022 08:16 AM    AST 52 08/24/2022 08:16 AM    PROT 7.0 08/24/2022 08:16 AM    BILITOT 1.0 08/24/2022 08:16 AM    BILIDIR 0.3 08/24/2022 08:16 AM    IBILI 0.7 08/24/2022 08:16 AM    LABALBU 4.0 08/24/2022 08:16 AM       Date Warfarin Dose INR Heparin or LMWH HBG/HCT PLT Comment   8/22 -- >10  15.0/44.2 156 Kcentra, Vitamin K   8/23 -- 1.3  14.1/42.6 110    8/24 -- 1.7  13.4/41.1 130    8/25 5 mg -- Heparin gtt 11.8/36.5 105    8/26 5 mg 1.6 Heparin gtt -- --    8/27 5 mg 1.7 Heparin gtt -- --    8/28 10 mg  (5+5) 1.7 Heparin gtt -- -- Pharmacy officially consulted   8/29 <1 mg> 2.1 Heparin gtt -- --      Assessment:  Mahogany Moreno is a 65 y.o. male chronically anticoagulated on warfarin 2/2 AVR and MVR with a goal INR of 2.5 - 3.5; Upon admission, INR was supratherapeutic (>10) requiring 10 mg of Phytonadione (and Kcentra) on 8/22. Phytonadione administration may result in diminished anticoagulant effects resulting in warfarin resistance. This is usually seen with Phytonadione doses of 5 - 10 mg, and resistance to warfarin may last for up to 1 week.  (CHEST 1998, [address] Guide to Warfarin Therapy 2003)    Plan:  Warfarin 1 mg tonight  Daily PT/INR until the INR is stable within the therapeutic range  Pharmacist will follow and monitor/adjust dosing as necessary    Марина Tubbs PharmD, BCPS 8/29/2022 10:26 AM

## 2022-09-03 NOTE — PROGRESS NOTES
[facility]  Department of Family Medicine      Patient:  Vinny De Jesus 65 y.o. male     Date of Service: 9/1/22      Chief complaint:   Chief Complaint   Patient presents with    Abdominal Pain           History ofPresent Illness   The patient is a 65 y.o. male  presented to the clinic with complaints as above. HFU  -for abdominal pain, scopes done which showed gastritis, duodenitis, and polyps, RUCHI showed large internal hemorrhoids, hida scan negative for acute gallbladder, patient put on protonix, carafate, and pain medication, INR supratherapeutic (on coumadin for aortic and mitral valve replacement???), discharged home on coumadin and lovenoxbridge, takes 2.5 mg 5 days a week, 5 mg 2 days a week  -currently, still having a lot of upper right abdominal pain and diarrhea, states every time he eats something it is diarrhea, this has been going on for 3 months now   -does not feel like the medications are helping   -does not have f/u with general surgeon  -drank alcohol last night to help his pain, drank 2 24 ounces of beer/malt liquor   -is not taking his lovenox as he forgot to do it     Is depressed  -denies any HI or SI     Alcohol abuse  Mechanical valve? ???     Past Medical History:      Diagnosis Date    Alcoholic liver disease ([address]     H/O prosthetic aortic valve replacement 09/2020    [facility] - main  Mitral and pacemaker at this time as well    History of mitral valve replacement 09/2020    at [facility] - main  Aortic Valve placed at this time as well as pacemaker    Stroke [facility])     Testicular cancer ([facility] [address]     in remission since 1988       PastSurgical History:        Procedure Laterality Date    COLONOSCOPY N/A 8/24/2022    COLONOSCOPY POLYPECTOMY HOT BIOPSY performed by Katiana Malhotra MD at [address] Fracture 9/52/4533     PACEMAKER PLACEMENT  09/2020    UPPER GASTROINTESTINAL ENDOSCOPY N/A 8/24/2022    EGD DIAGNOSTIC ONLY performed by Tianna Adams MD at [address]:    Patient has no known allergies. Social History:   Social History     Socioeconomic History    Marital status:      Spouse name: Not on file    Number of children: Not on file    Years of education: Not on file    Highest education level: Not on file   Occupational History    Not on file   Tobacco Use    Smoking status: Every Day     Packs/day: 1.00     Years: 35.00     Pack years: 35.00     Types: Cigarettes    Smokeless tobacco: Never   Substance and Sexual Activity    Alcohol use: Yes     Comment: occassional    Drug use: Never    Sexual activity: Not on file   Other Topics Concern    Not on file   Social History Narrative    Not on file     Social Determinants of Health     Financial Resource Strain: Not on file   Food Insecurity: Not on file   Transportation Needs: Not on file   Physical Activity: Not on file   Stress: Not on file   Social Connections: Not on file   Intimate Partner Violence: Not on file   Housing Stability: Not on file        Family History:   No family history on file. Review of Systems:   Review of Systems - as above     Physical Exam   Vitals: BP (!) 150/86   Pulse 99   Temp 96.9 °F (36.1 °C) (Infrared)   Resp 18   Ht 5' 9.5\" (1.765 m)   Wt 254 lb (115.2 kg)   SpO2 97%   BMI 36.97 kg/m²   Physical Exam  Constitutional:       Appearance: He is well-developed. HENT:      Head: Normocephalic and atraumatic. Eyes:      General:         Right eye: No discharge. Left eye: No discharge. Conjunctiva/sclera: Conjunctivae normal.   Neck:      Trachea: No tracheal deviation. Cardiovascular:      Rate and Rhythm: Normal rate and regular rhythm. Heart sounds: Normal heart sounds. Pulmonary:      Effort: Pulmonary effort is normal. No respiratory distress. Breath sounds: Normal breath sounds. No wheezing. Abdominal:      General: Bowel sounds are normal. There is no distension. Palpations: Abdomen is soft. Tenderness: abdominal tenderness (RUQ)   Musculoskeletal:         General: No tenderness. Cervical back: Normal range of motion and neck supple. Skin:     General: Skin is warm and dry. Neurological:      Mental Status: He is alert. Psychiatric:         Behavior: Behavior normal.           Assessment and Plan       1. Hospital discharge follow-up  For RUQ abdominal pain, imaging ruled out gallbladder pathology with HIDA scan, unclear etiology as of now, given his alcohol abuse could be chronic pancreatitis, however is only tender in RUQ and not epigastric area, will refer to GI and start medications to try and treat his diarrhea and abdominal pain (IBS???) with close f/u     2. Right upper quadrant abdominal pain  As above  - Comprehensive Metabolic Panel; Future  - [address] [facility] [address], Jose Dillon, CNP, Gastroenterology, [place]  - linaCLOtide [facility]) 72 MCG CAPS capsule; Take 1 capsule by mouth every morning (before breakfast)  Dispense: 30 capsule; Refill: 0    3. Diarrhea, unspecified type  As above  - linaCLOtide (LINZESS) 72 MCG CAPS capsule; Take 1 capsule by mouth every morning (before breakfast)  Dispense: 30 capsule; Refill: 0    4. Current moderate episode of major depressive disorder without prior episode ([facility] [address]  New issue  -Unclear if this is a chronic issue for patient or stemming from his pain, will start on medication cymbalta to see if this helps with his mood and possibly his abdominal pain and back pain   - DULoxetine (CYMBALTA) 30 MG extended release capsule; Take 1 capsule by mouth daily  Dispense: 30 capsule; Refill: 5    5. Hypertension, unspecified type  New issue  -Elevated in office, wondering if secondary to pain or on going issue for patient, will start on amlodipine as below as would avoid starting HCTZ or lisinopril for now given his elevated creatinine   - amLODIPine (NORVASC) 5 MG tablet;  Take 1 tablet by mouth daily  Dispense: 30 tablet; Refill: 0    6. Subtherapeutic INR  2.4 in office today, given he has mechanical valves, should be 2.5-3.5, therefore increased regimen   - CBC; Future  - POCT INR    7. Hx of heart valve replacement with mechanical valve  As above, will also refer to cardiology as this may need monitored   - Luis Felipe Freeman MD, Cardiology, [place]  - warfarin (COUMADIN) 2.5 MG tablet; Take 2.5 mg daily on Tuesday through Friday, 5 mg daily Saturday through Monday  Dispense: 30 tablet; Refill: 0    Counseled regarding above diagnosis, including possible risks and complications,  especially if left uncontrolled. Counseled regarding the possible side effects, risks, benefits and alternatives to treatment;patient and/or guardian verbalizes understanding, agrees, feels comfortable with and wishes to proceed with above treatment plan. Call or go to [address] if symptoms worsen or persist. Advised patient to call with any new medication issues, and, as applicable, read all Rx info from pharmacy to assure aware of all possible risks and side effects of medicationbefore taking. Patient and/or guardian given opportunity to ask questions/raise concerns. The patient verbalized comfort and understanding ofinstructions. I encourage further reading and education about your health conditions. Information on many health conditions is provided by [facility] [employer]: [email or web address]  Please bring any questions to me at your nextvisit. Return to Office: Return in about 2 weeks (around 9/21/2022) for f/u pain and blood pressure .     Medication List:    Current Outpatient Medications   Medication Sig Dispense Refill    linaCLOtide (LINZESS) 72 MCG CAPS capsule Take 1 capsule by mouth every morning (before breakfast) 30 capsule 0    DULoxetine (CYMBALTA) 30 MG extended release capsule Take 1 capsule by mouth daily 30 capsule 5    warfarin (COUMADIN) 2.5 MG tablet Take 2.5 mg daily on Tuesday through Friday, 5 mg daily Saturday through Monday 30 tablet 0    amLODIPine (NORVASC) 5 MG tablet Take 1 tablet by mouth daily 30 tablet 0    aspirin 81 MG EC tablet Take 1 tablet by mouth daily 30 tablet 3    metoprolol succinate (TOPROL XL) 25 MG extended release tablet Take 1 tablet by mouth daily 30 tablet 3    nicotine polacrilex (COMMIT) 2 MG lozenge Take 1 lozenge by mouth every 2 hours as needed for Smoking cessation 100 each 3    sucralfate (CARAFATE) 1 GM tablet Take 1 tablet by mouth 4 times daily 120 tablet 0    pantoprazole (PROTONIX) 40 MG tablet Take 1 tablet by mouth 2 times daily (before meals) 30 tablet 0     No current facility-administered medications for this visit. Teresa Grande,        This document may have been prepared at least partially through the use of voice recognition software. Although effort is taken to assure the accuracy ofthis document, it is possible that grammatical, syntax,  or spelling errors may occur.

## 2022-09-07 ENCOUNTER — OFFICE VISIT (OUTPATIENT)
Dept: PRIMARY CARE CLINIC | Age: 57
End: 2022-09-07
Payer: COMMERCIAL

## 2022-09-07 VITALS
OXYGEN SATURATION: 97 % | BODY MASS INDEX: 36.36 KG/M2 | TEMPERATURE: 96.9 F | SYSTOLIC BLOOD PRESSURE: 150 MMHG | RESPIRATION RATE: 18 BRPM | DIASTOLIC BLOOD PRESSURE: 86 MMHG | HEIGHT: 70 IN | WEIGHT: 254 LBS | HEART RATE: 99 BPM

## 2022-09-07 DIAGNOSIS — R10.11 RIGHT UPPER QUADRANT ABDOMINAL PAIN: ICD-10-CM

## 2022-09-07 DIAGNOSIS — R79.1 SUBTHERAPEUTIC INTERNATIONAL NORMALIZED RATIO (INR): ICD-10-CM

## 2022-09-07 DIAGNOSIS — Z09 HOSPITAL DISCHARGE FOLLOW-UP: Primary | ICD-10-CM

## 2022-09-07 DIAGNOSIS — I10 HYPERTENSION, UNSPECIFIED TYPE: ICD-10-CM

## 2022-09-07 DIAGNOSIS — Z95.2 HX OF HEART VALVE REPLACEMENT WITH MECHANICAL VALVE: ICD-10-CM

## 2022-09-07 DIAGNOSIS — R19.7 DIARRHEA, UNSPECIFIED TYPE: ICD-10-CM

## 2022-09-07 DIAGNOSIS — F32.1 CURRENT MODERATE EPISODE OF MAJOR DEPRESSIVE DISORDER WITHOUT PRIOR EPISODE (HCC): ICD-10-CM

## 2022-09-07 LAB
INTERNATIONAL NORMALIZATION RATIO, POC: 2.4
PROTHROMBIN TIME, POC: 29.1

## 2022-09-07 PROCEDURE — G8417 CALC BMI ABV UP PARAM F/U: HCPCS | Performed by: STUDENT IN AN ORGANIZED HEALTH CARE EDUCATION/TRAINING PROGRAM

## 2022-09-07 PROCEDURE — 3017F COLORECTAL CA SCREEN DOC REV: CPT | Performed by: STUDENT IN AN ORGANIZED HEALTH CARE EDUCATION/TRAINING PROGRAM

## 2022-09-07 PROCEDURE — 99204 OFFICE O/P NEW MOD 45 MIN: CPT | Performed by: STUDENT IN AN ORGANIZED HEALTH CARE EDUCATION/TRAINING PROGRAM

## 2022-09-07 PROCEDURE — 4004F PT TOBACCO SCREEN RCVD TLK: CPT | Performed by: STUDENT IN AN ORGANIZED HEALTH CARE EDUCATION/TRAINING PROGRAM

## 2022-09-07 PROCEDURE — G8427 DOCREV CUR MEDS BY ELIG CLIN: HCPCS | Performed by: STUDENT IN AN ORGANIZED HEALTH CARE EDUCATION/TRAINING PROGRAM

## 2022-09-07 PROCEDURE — 1111F DSCHRG MED/CURRENT MED MERGE: CPT | Performed by: STUDENT IN AN ORGANIZED HEALTH CARE EDUCATION/TRAINING PROGRAM

## 2022-09-07 PROCEDURE — 85610 PROTHROMBIN TIME: CPT | Performed by: STUDENT IN AN ORGANIZED HEALTH CARE EDUCATION/TRAINING PROGRAM

## 2022-09-07 RX ORDER — DULOXETIN HYDROCHLORIDE 30 MG/1
30 CAPSULE, DELAYED RELEASE ORAL DAILY
Qty: 30 CAPSULE | Refills: 5 | Status: SHIPPED | OUTPATIENT
Start: 2022-09-07

## 2022-09-07 RX ORDER — AMLODIPINE BESYLATE 5 MG/1
5 TABLET ORAL DAILY
Qty: 30 TABLET | Refills: 0 | Status: SHIPPED
Start: 2022-09-07 | End: 2022-10-04

## 2022-09-07 RX ORDER — WARFARIN SODIUM 2.5 MG/1
TABLET ORAL
Qty: 30 TABLET | Refills: 0 | Status: SHIPPED
Start: 2022-09-07 | End: 2022-09-26

## 2022-09-07 ASSESSMENT — PATIENT HEALTH QUESTIONNAIRE - PHQ9
10. IF YOU CHECKED OFF ANY PROBLEMS, HOW DIFFICULT HAVE THESE PROBLEMS MADE IT FOR YOU TO DO YOUR WORK, TAKE CARE OF THINGS AT HOME, OR GET ALONG WITH OTHER PEOPLE: 0
1. LITTLE INTEREST OR PLEASURE IN DOING THINGS: 3
SUM OF ALL RESPONSES TO PHQ QUESTIONS 1-9: 18
SUM OF ALL RESPONSES TO PHQ QUESTIONS 1-9: 18
3. TROUBLE FALLING OR STAYING ASLEEP: 3
9. THOUGHTS THAT YOU WOULD BE BETTER OFF DEAD, OR OF HURTING YOURSELF: 0
4. FEELING TIRED OR HAVING LITTLE ENERGY: 3
SUM OF ALL RESPONSES TO PHQ QUESTIONS 1-9: 18
SUM OF ALL RESPONSES TO PHQ QUESTIONS 1-9: 18
5. POOR APPETITE OR OVEREATING: 3
6. FEELING BAD ABOUT YOURSELF - OR THAT YOU ARE A FAILURE OR HAVE LET YOURSELF OR YOUR FAMILY DOWN: 0
7. TROUBLE CONCENTRATING ON THINGS, SUCH AS READING THE NEWSPAPER OR WATCHING TELEVISION: 3
2. FEELING DOWN, DEPRESSED OR HOPELESS: 3
SUM OF ALL RESPONSES TO PHQ9 QUESTIONS 1 & 2: 6
8. MOVING OR SPEAKING SO SLOWLY THAT OTHER PEOPLE COULD HAVE NOTICED. OR THE OPPOSITE, BEING SO FIGETY OR RESTLESS THAT YOU HAVE BEEN MOVING AROUND A LOT MORE THAN USUAL: 0

## 2022-09-08 DIAGNOSIS — R19.7 DIARRHEA, UNSPECIFIED TYPE: ICD-10-CM

## 2022-09-08 DIAGNOSIS — R10.11 RIGHT UPPER QUADRANT ABDOMINAL PAIN: ICD-10-CM

## 2022-09-08 PROBLEM — F32.1 CURRENT MODERATE EPISODE OF MAJOR DEPRESSIVE DISORDER WITHOUT PRIOR EPISODE (HCC): Status: ACTIVE | Noted: 2022-09-08

## 2022-09-08 PROBLEM — I10 HYPERTENSION: Status: ACTIVE | Noted: 2022-09-08

## 2022-09-08 NOTE — TELEPHONE ENCOUNTER
Last Appointment:  9/7/2022  Future Appointments   Date Time Provider Adair Good   9/14/2022  9:30 AM SCHEDULE, Tomy Sandhoff EISEN[facility]OWER PC [facility]   9/15/2022 10:00 AM ARNALDO Fonseca CNP [facility]   9/21/2022  9:30 AM Que Crockett DO [facility]

## 2022-09-08 NOTE — TELEPHONE ENCOUNTER
linaCLOtide (LINZESS) 67 MCG CAPS capsule     Pt's partner, Maron Klinefelter (is on HIPAA) stated the above medication was not filled and is wondering why

## 2022-09-13 DIAGNOSIS — R10.11 RIGHT UPPER QUADRANT ABDOMINAL PAIN: Primary | ICD-10-CM

## 2022-09-13 RX ORDER — POLYETHYLENE GLYCOL 3350 17 G/17G
17 POWDER, FOR SOLUTION ORAL DAILY
Qty: 1530 G | Refills: 1 | Status: SHIPPED | OUTPATIENT
Start: 2022-09-13 | End: 2022-10-13

## 2022-09-15 ENCOUNTER — NURSE ONLY (OUTPATIENT)
Dept: PRIMARY CARE CLINIC | Age: 57
End: 2022-09-15
Payer: COMMERCIAL

## 2022-09-15 ENCOUNTER — HOSPITAL ENCOUNTER (EMERGENCY)
Age: 57
Discharge: HOME | End: 2022-09-15
Attending: EMERGENCY MEDICINE
Payer: COMMERCIAL

## 2022-09-15 ENCOUNTER — TELEPHONE (OUTPATIENT)
Dept: GASTROENTEROLOGY | Age: 57
End: 2022-09-15

## 2022-09-15 VITALS
BODY MASS INDEX: 37.03 KG/M2 | RESPIRATION RATE: 16 BRPM | HEIGHT: 69 IN | WEIGHT: 250 LBS | DIASTOLIC BLOOD PRESSURE: 104 MMHG | SYSTOLIC BLOOD PRESSURE: 161 MMHG | HEART RATE: 93 BPM | OXYGEN SATURATION: 96 % | TEMPERATURE: 97.8 F

## 2022-09-15 DIAGNOSIS — R79.1 ELEVATED INR: Primary | ICD-10-CM

## 2022-09-15 DIAGNOSIS — Z95.2 HX OF HEART VALVE REPLACEMENT WITH MECHANICAL VALVE: Primary | ICD-10-CM

## 2022-09-15 LAB
ALBUMIN SERPL-MCNC: 3.7 G/DL (ref 3.5–5.2)
ALP BLD-CCNC: 137 U/L (ref 40–129)
ALT SERPL-CCNC: 39 U/L (ref 0–40)
ANION GAP SERPL CALCULATED.3IONS-SCNC: 11 MMOL/L (ref 7–16)
APTT: 73.7 SEC (ref 24.5–35.1)
AST SERPL-CCNC: 54 U/L (ref 0–39)
BACTERIA: ABNORMAL /HPF
BASOPHILS ABSOLUTE: 0.06 E9/L (ref 0–0.2)
BASOPHILS RELATIVE PERCENT: 0.9 % (ref 0–2)
BILIRUB SERPL-MCNC: 0.5 MG/DL (ref 0–1.2)
BILIRUBIN URINE: NEGATIVE
BLOOD, URINE: ABNORMAL
BUN BLDV-MCNC: 12 MG/DL (ref 6–20)
CALCIUM SERPL-MCNC: 8.9 MG/DL (ref 8.6–10.2)
CHLORIDE BLD-SCNC: 101 MMOL/L (ref 98–107)
CLARITY: ABNORMAL
CO2: 21 MMOL/L (ref 22–29)
COLOR: YELLOW
CREAT SERPL-MCNC: 1.4 MG/DL (ref 0.7–1.2)
EOSINOPHILS ABSOLUTE: 0.21 E9/L (ref 0.05–0.5)
EOSINOPHILS RELATIVE PERCENT: 3.1 % (ref 0–6)
EPITHELIAL CELLS, UA: ABNORMAL /HPF
GFR AFRICAN AMERICAN: >60
GFR NON-AFRICAN AMERICAN: 52 ML/MIN/1.73
GLUCOSE BLD-MCNC: 94 MG/DL (ref 74–99)
GLUCOSE URINE: NEGATIVE MG/DL
HCT VFR BLD CALC: 46.2 % (ref 37–54)
HEMOGLOBIN: 15.3 G/DL (ref 12.5–16.5)
IMMATURE GRANULOCYTES #: 0.02 E9/L
IMMATURE GRANULOCYTES %: 0.3 % (ref 0–5)
INR BLD: >10
INTERNATIONAL NORMALIZATION RATIO, POC: 8
KETONES, URINE: NEGATIVE MG/DL
LEUKOCYTE ESTERASE, URINE: NEGATIVE
LYMPHOCYTES ABSOLUTE: 1.11 E9/L (ref 1.5–4)
LYMPHOCYTES RELATIVE PERCENT: 16.2 % (ref 20–42)
MCH RBC QN AUTO: 30.8 PG (ref 26–35)
MCHC RBC AUTO-ENTMCNC: 33.1 % (ref 32–34.5)
MCV RBC AUTO: 93 FL (ref 80–99.9)
MONOCYTES ABSOLUTE: 1.12 E9/L (ref 0.1–0.95)
MONOCYTES RELATIVE PERCENT: 16.3 % (ref 2–12)
MUCUS: PRESENT /LPF
NEUTROPHILS ABSOLUTE: 4.35 E9/L (ref 1.8–7.3)
NEUTROPHILS RELATIVE PERCENT: 63.2 % (ref 43–80)
NITRITE, URINE: NEGATIVE
PDW BLD-RTO: 15.1 FL (ref 11.5–15)
PH UA: 6 (ref 5–9)
PLATELET # BLD: 183 E9/L (ref 130–450)
PMV BLD AUTO: 11.3 FL (ref 7–12)
POTASSIUM REFLEX MAGNESIUM: 3.9 MMOL/L (ref 3.5–5)
PROTEIN UA: 100 MG/DL
PROTHROMBIN TIME, POC: 96
PROTHROMBIN TIME: >120 SEC (ref 9.3–12.4)
RBC # BLD: 4.97 E12/L (ref 3.8–5.8)
RBC UA: ABNORMAL /HPF (ref 0–2)
SODIUM BLD-SCNC: 133 MMOL/L (ref 132–146)
SPECIFIC GRAVITY UA: 1.02 (ref 1–1.03)
TOTAL PROTEIN: 6.9 G/DL (ref 6.4–8.3)
UROBILINOGEN, URINE: 1 E.U./DL
WBC # BLD: 6.9 E9/L (ref 4.5–11.5)
WBC UA: ABNORMAL /HPF (ref 0–5)

## 2022-09-15 PROCEDURE — 6370000000 HC RX 637 (ALT 250 FOR IP): Performed by: EMERGENCY MEDICINE

## 2022-09-15 PROCEDURE — 85025 COMPLETE CBC W/AUTO DIFF WBC: CPT

## 2022-09-15 PROCEDURE — 85610 PROTHROMBIN TIME: CPT

## 2022-09-15 PROCEDURE — 85730 THROMBOPLASTIN TIME PARTIAL: CPT

## 2022-09-15 PROCEDURE — 99284 EMERGENCY DEPT VISIT MOD MDM: CPT

## 2022-09-15 PROCEDURE — 85610 PROTHROMBIN TIME: CPT | Performed by: STUDENT IN AN ORGANIZED HEALTH CARE EDUCATION/TRAINING PROGRAM

## 2022-09-15 PROCEDURE — 80053 COMPREHEN METABOLIC PANEL: CPT

## 2022-09-15 PROCEDURE — 81001 URINALYSIS AUTO W/SCOPE: CPT

## 2022-09-15 RX ORDER — PHYTONADIONE 5 MG/1
5 TABLET ORAL ONCE
Status: COMPLETED | OUTPATIENT
Start: 2022-09-15 | End: 2022-09-15

## 2022-09-15 RX ORDER — ACETAMINOPHEN 500 MG
1000 TABLET ORAL ONCE
Status: COMPLETED | OUTPATIENT
Start: 2022-09-15 | End: 2022-09-15

## 2022-09-15 RX ADMIN — ACETAMINOPHEN 1000 MG: 500 TABLET ORAL at 16:11

## 2022-09-15 RX ADMIN — PHYTONADIONE 5 MG: 5 TABLET ORAL at 17:06

## 2022-09-15 ASSESSMENT — PAIN DESCRIPTION - ONSET: ONSET: ON-GOING

## 2022-09-15 ASSESSMENT — ENCOUNTER SYMPTOMS
RHINORRHEA: 0
SHORTNESS OF BREATH: 0
NAUSEA: 1
COLOR CHANGE: 0
VOMITING: 0
TROUBLE SWALLOWING: 0
ABDOMINAL PAIN: 0
DIARRHEA: 0
BLOOD IN STOOL: 0
COUGH: 0

## 2022-09-15 ASSESSMENT — PAIN DESCRIPTION - ORIENTATION: ORIENTATION: RIGHT

## 2022-09-15 ASSESSMENT — PAIN DESCRIPTION - LOCATION: LOCATION: FLANK;ABDOMEN

## 2022-09-15 ASSESSMENT — PAIN DESCRIPTION - PAIN TYPE: TYPE: CHRONIC PAIN

## 2022-09-15 ASSESSMENT — PAIN SCALES - GENERAL
PAINLEVEL_OUTOF10: 7
PAINLEVEL_OUTOF10: 7

## 2022-09-15 ASSESSMENT — PAIN - FUNCTIONAL ASSESSMENT: PAIN_FUNCTIONAL_ASSESSMENT: ACTIVITIES ARE NOT PREVENTED

## 2022-09-15 ASSESSMENT — PAIN DESCRIPTION - FREQUENCY: FREQUENCY: INTERMITTENT

## 2022-09-15 ASSESSMENT — PAIN DESCRIPTION - DESCRIPTORS: DESCRIPTORS: THROBBING

## 2022-09-15 NOTE — TELEPHONE ENCOUNTER
Called patient to notify them about their missed appointment with Jahaira Whitman he rescheduled for next week .  He overslept

## 2022-09-15 NOTE — ED PROVIDER NOTES
ED PROVIDER NOTE    Chief Complaint   Patient presents with    Abnormal Lab     INR drawn today greater than 8       HPI:  9/15/22,   Time: 3:28 PM EDT       Isaac Graves is a 65 y.o. male presenting to the ED for abnormal labs. Acute onset today, persistent since onset, moderate in severity, no aggravating/alleviating factors. INR >8 today on outpatient labs. On warfarin for hx of aortic and mitral valve replacements. Had blood in stool couple weeks ago, recently no black/bloody stools. Right flank pain and nausea ongoing for several weeks, had admission on 8/22/22 for possible cholecystitis and elevated troponin. Had positive stool guaiac at that time. During the admission had EGD/colonoscopy showing gastritis, duodenitis, and colon polyps. Right flank pain thought to be possible chronic due to chronic pancreatitis. HIDA was negative for acute gallbladder/biliary pathology. Currently denies hematemesis, hemoptysis, hematuria, or black/bloody stools. Chart review: hx of aortic and mitral valve replacement, alcoholic liver disease, CVA    Review of Systems:     Review of Systems   Constitutional:  Negative for appetite change, chills and fever. HENT:  Negative for congestion, rhinorrhea and trouble swallowing. Eyes:  Negative for visual disturbance. Respiratory:  Negative for cough and shortness of breath. Cardiovascular:  Negative for chest pain and leg swelling. Gastrointestinal:  Positive for nausea. Negative for abdominal pain, blood in stool, diarrhea and vomiting. Genitourinary:  Positive for flank pain. Negative for decreased urine volume, difficulty urinating, dysuria, frequency, hematuria and urgency. Musculoskeletal:  Negative for myalgias, neck pain and neck stiffness. Skin:  Negative for color change.    Neurological:  Negative for dizziness, syncope, weakness, light-headedness, numbness and headaches.       --------------------------------------------- PAST HISTORY ---------------------------------------------  Past Medical History:   Past Medical History:   Diagnosis Date    Alcoholic liver disease ([facility] [address]     H/O prosthetic aortic valve replacement 09/2020    [facility] - main  Mitral and pacemaker at this time as well    History of mitral valve replacement 09/2020    at [facility] - main  Aortic Valve placed at this time as well as pacemaker    Stroke [facility])     Testicular cancer ([facility] [address]     in remission since 1988       Past Surgical History:   Past Surgical History:   Procedure Laterality Date    COLONOSCOPY N/A 8/24/2022    COLONOSCOPY POLYPECTOMY HOT BIOPSY performed by Holli Pineda MD at [address] Fracture 3/70/4754     PACEMAKER PLACEMENT  09/2020    UPPER GASTROINTESTINAL ENDOSCOPY N/A 8/24/2022    EGD DIAGNOSTIC ONLY performed by Holli Pineda MD at [address] History:   Social History     Socioeconomic History    Marital status:    Tobacco Use    Smoking status: Every Day     Packs/day: 1.00     Years: 35.00     Pack years: 35.00     Types: Cigarettes    Smokeless tobacco: Never   Substance and Sexual Activity    Alcohol use: Yes     Comment: occassional    Drug use: Never       Family History:   No family history on file. The patients home medications have been reviewed. Allergies:   No Known Allergies        ---------------------------------------------------PHYSICAL EXAM--------------------------------------    /84   Pulse 90   Temp 97.8 °F (36.6 °C) (Oral)   Resp 18   Ht 5' 9\" (1.753 m)   Wt 250 lb (113.4 kg)   SpO2 99%   BMI 36.92 kg/m²     Physical Exam       -------------------------------------------------- RESULTS -------------------------------------------------  I have personally reviewed all laboratory and imaging results for this patient. Results are listed below.      LABS:  Labs Reviewed   CBC WITH AUTO DIFFERENTIAL - Abnormal; Notable for the following components:       Result Value    RDW 15.1 (*)     Lymphocytes % 16.2 (*)     Monocytes % 16.3 (*)     Lymphocytes Absolute 1.11 (*)     Monocytes Absolute 1.12 (*)     All other components within normal limits   COMPREHENSIVE METABOLIC PANEL W/ REFLEX TO MG FOR LOW K - Abnormal; Notable for the following components:    CO2 21 (*)     Creatinine 1.4 (*)     Alkaline Phosphatase 137 (*)     AST 54 (*)     All other components within normal limits   PROTIME-INR - Abnormal; Notable for the following components:    Protime >120.0 (*)     INR >10.0 (*)     All other components within normal limits    Narrative:     Annalisa Johns tel. [phone],  Coag results called to and read back by Binta Knight RN, 09/15/2022 16:09, by  YVETTE   APTT - Abnormal; Notable for the following components:    aPTT 73.7 (*)     All other components within normal limits    Narrative:     Annalisa Johns tel. [phone],  Coag results called to and read back by Binta Knight RN, 09/15/2022 16:09, by  [address] - Abnormal; Notable for the following components:    Blood, Urine SMALL (*)     Protein,  (*)     Mucus, UA Present (*)     Bacteria, UA RARE (*)     All other components within normal limits     ------------------------- NURSING NOTES AND VITALS REVIEWED ---------------------------   The nursing notes within the ED encounter and vital signs as below have been reviewed by myself. /84   Pulse 90   Temp 97.8 °F (36.6 °C) (Oral)   Resp 18   Ht 5' 9\" (1.753 m)   Wt 250 lb (113.4 kg)   SpO2 99%   BMI 36.92 kg/m²   Oxygen Saturation Interpretation: Normal    The patients available past medical records and past encounters were reviewed.         ------------------------------ ED COURSE/MEDICAL DECISION MAKING----------------------  Medications   acetaminophen (TYLENOL) tablet 1,000 mg (1,000 mg Oral Given 9/15/22 1611)   phytonadione (VITAMIN K) tablet 5 mg (5 mg Oral Given 9/15/22 1706) Counseling: The emergency provider has spoken with the patient and discussed todays results, in addition to providing specific details for the plan of care and counseling regarding the diagnosis and prognosis. Questions are answered at this time and they are agreeable with the plan. ED Course/Medical Decision Makin y.o. male here with elevated INR. Non-toxic appearing, afebrile, hemodynamically stable, and in no acute distress. No evidence of acute blood loss at this time. Gave vitamin K 5mg PO, advised to hold warfarin until further advised by PCP. Has right flank pain and nausea stable from recent hospital admission. Chart review shows this is thought to be due to possible chronic pancreatitis. Does have hx of RP hematoma, however given stable H/H no indication for repeat imaging at this time. After discussion of findings and return precautions, patient agrees with plan for discharge and outpatient follow up with PCP.        --------------------------------- IMPRESSION AND DISPOSITION ---------------------------------    IMPRESSION  1. Elevated INR        DISPOSITION  Disposition: Discharge to home  Patient condition is good    NOTE: This report was transcribed using voice recognition software.  Every effort was made to ensure accuracy; however, inadvertent computerized transcription errors may be present    Tiffany Ugalde MD  Attending Emergency Physician         Tiffany Ugalde MD  09/15/22 9042

## 2022-09-15 NOTE — PROGRESS NOTES
Contacted Dr. Mann Dior due to critical INR result. Dr. Mann Dior instructed patient to go to ED. Patient informed and verbalized understanding to proceed to the ED.  [place] called to [place] [id number] ED

## 2022-09-15 NOTE — ED NOTES
Department of Emergency Medicine    FIRST PROVIDER TRIAGE NOTE             Independent MLP           9/15/22  2:01 PM EDT    Date of Encounter: 9/15/22   MRN: [id number]    Vitals:    09/15/22 1400   BP: (!) 160/105   Pulse: 96   Resp: (!) 165   Temp: 97.8 °F (36.6 °C)   SpO2: 96%   Weight: 250 lb (113.4 kg)   Height: 5' 9\" (1.753 m)      HPI: Chiara Bangura is a 65 y.o. male who presents to the ED for Abnormal Lab (INR drawn today greater than 8)     Patient reports mild shortness of breath as well     ROS: Negative for cp or sob. Physical Exam:   Gen Appearance/Constitutional: alert  CV: regular rate     Initial Plan of Care: All treatment areas with department are currently occupied. Plan to order/Initiate the following while awaiting opening in ED: labs and EKG.     Initial Plan of Care: Initiate Treatment-Testing, Proceed toTreatment Area When Bed Available for ED Attending/MLP to Continue Care    Electronically signed by Karie Martin PA-C   DD: 9/15/22       Karie Martin PA-C  09/15/22 5950

## 2022-09-16 ENCOUNTER — TELEPHONE (OUTPATIENT)
Dept: PRIMARY CARE CLINIC | Age: 57
End: 2022-09-16

## 2022-09-16 DIAGNOSIS — Z95.2 HX OF HEART VALVE REPLACEMENT WITH MECHANICAL VALVE: Primary | ICD-10-CM

## 2022-09-16 NOTE — TELEPHONE ENCOUNTER
Patient can't come in today. Please place order for protime INR, he will go to hospital and get it checked this weekend.           Thank you

## 2022-09-24 DIAGNOSIS — Z95.2 HX OF HEART VALVE REPLACEMENT WITH MECHANICAL VALVE: ICD-10-CM

## 2022-09-26 RX ORDER — WARFARIN SODIUM 2.5 MG/1
TABLET ORAL
Qty: 30 TABLET | Refills: 0 | Status: SHIPPED | OUTPATIENT
Start: 2022-09-26

## 2022-10-04 DIAGNOSIS — I10 HYPERTENSION, UNSPECIFIED TYPE: ICD-10-CM

## 2022-10-04 RX ORDER — AMLODIPINE BESYLATE 5 MG/1
TABLET ORAL
Qty: 30 TABLET | Refills: 0 | Status: SHIPPED | OUTPATIENT
Start: 2022-10-04

## 2022-11-13 DIAGNOSIS — I10 HYPERTENSION, UNSPECIFIED TYPE: ICD-10-CM

## 2022-11-14 RX ORDER — AMLODIPINE BESYLATE 5 MG/1
TABLET ORAL
Qty: 30 TABLET | Refills: 3 | Status: SHIPPED | OUTPATIENT
Start: 2022-11-14

## 2023-02-06 ENCOUNTER — HOSPITAL ENCOUNTER (INPATIENT)
Age: 58
LOS: 2 days | Discharge: HOME | DRG: 203 | End: 2023-02-11
Attending: EMERGENCY MEDICINE | Admitting: INTERNAL MEDICINE
Payer: COMMERCIAL

## 2023-02-06 ENCOUNTER — APPOINTMENT (OUTPATIENT)
Dept: CT IMAGING | Age: 58
DRG: 203 | End: 2023-02-06
Payer: COMMERCIAL

## 2023-02-06 ENCOUNTER — APPOINTMENT (OUTPATIENT)
Dept: GENERAL RADIOLOGY | Age: 58
DRG: 203 | End: 2023-02-06
Payer: COMMERCIAL

## 2023-02-06 DIAGNOSIS — Z95.2 S/P MVR (MITRAL VALVE REPLACEMENT): ICD-10-CM

## 2023-02-06 DIAGNOSIS — R07.9 CHEST PAIN, UNSPECIFIED TYPE: ICD-10-CM

## 2023-02-06 DIAGNOSIS — Z95.2 S/P AVR (AORTIC VALVE REPLACEMENT): Primary | ICD-10-CM

## 2023-02-06 PROBLEM — I25.10 CORONARY ARTERY DISEASE INVOLVING NATIVE CORONARY ARTERY OF NATIVE HEART WITHOUT ANGINA PECTORIS: Status: ACTIVE | Noted: 2023-02-06

## 2023-02-06 LAB
ALBUMIN SERPL-MCNC: 3.7 G/DL (ref 3.5–5.2)
ALP BLD-CCNC: 131 U/L (ref 40–129)
ALT SERPL-CCNC: 46 U/L (ref 0–40)
ANION GAP SERPL CALCULATED.3IONS-SCNC: 16 MMOL/L (ref 7–16)
AST SERPL-CCNC: 48 U/L (ref 0–39)
B.E.: -4.5 MMOL/L (ref -3–3)
BILIRUB SERPL-MCNC: 0.7 MG/DL (ref 0–1.2)
BUN BLDV-MCNC: 7 MG/DL (ref 6–20)
CALCIUM SERPL-MCNC: 8.3 MG/DL (ref 8.6–10.2)
CHLORIDE BLD-SCNC: 98 MMOL/L (ref 98–107)
CHOLESTEROL, TOTAL: 134 MG/DL (ref 0–199)
CO2: 19 MMOL/L (ref 22–29)
COHB: 2.9 % (ref 0–1.5)
CREAT SERPL-MCNC: 1.4 MG/DL (ref 0.7–1.2)
CRITICAL: ABNORMAL
DATE ANALYZED: ABNORMAL
DATE OF COLLECTION: ABNORMAL
EKG ATRIAL RATE: 104 BPM
EKG P-R INTERVAL: 160 MS
EKG Q-T INTERVAL: 378 MS
EKG QRS DURATION: 160 MS
EKG QTC CALCULATION (BAZETT): 497 MS
EKG R AXIS: -62 DEGREES
EKG T AXIS: 38 DEGREES
EKG VENTRICULAR RATE: 104 BPM
GFR SERPL CREATININE-BSD FRML MDRD: 59 ML/MIN/1.73
GLUCOSE BLD-MCNC: 145 MG/DL (ref 74–99)
HBA1C MFR BLD: 4.9 % (ref 4–5.6)
HCO3: 18.4 MMOL/L (ref 22–26)
HCT VFR BLD CALC: 39.1 % (ref 37–54)
HDLC SERPL-MCNC: 33 MG/DL
HEMOGLOBIN: 12.4 G/DL (ref 12.5–16.5)
HHB: 2.6 % (ref 0–5)
INR BLD: 1.8
LAB: ABNORMAL
LDL CHOLESTEROL CALCULATED: 72 MG/DL (ref 0–99)
Lab: ABNORMAL
MCH RBC QN AUTO: 29.7 PG (ref 26–35)
MCHC RBC AUTO-ENTMCNC: 31.7 % (ref 32–34.5)
MCV RBC AUTO: 93.8 FL (ref 80–99.9)
METHB: 0.3 % (ref 0–1.5)
MODE: ABNORMAL
O2 CONTENT: 16 ML/DL
O2 SATURATION: 97.3 % (ref 92–98.5)
O2HB: 94.2 % (ref 94–97)
OPERATOR ID: 316
PATIENT TEMP: 37 C
PCO2: 27.8 MMHG (ref 35–45)
PDW BLD-RTO: 15.8 FL (ref 11.5–15)
PH BLOOD GAS: 7.44 (ref 7.35–7.45)
PLATELET # BLD: 169 E9/L (ref 130–450)
PMV BLD AUTO: 11.7 FL (ref 7–12)
PO2: 88.7 MMHG (ref 75–100)
POTASSIUM SERPL-SCNC: 3.1 MMOL/L (ref 3.5–5)
PRO-BNP: 246 PG/ML (ref 0–125)
PROTHROMBIN TIME: 20.2 SEC (ref 9.3–12.4)
RBC # BLD: 4.17 E12/L (ref 3.8–5.8)
SODIUM BLD-SCNC: 133 MMOL/L (ref 132–146)
SOURCE, BLOOD GAS: ABNORMAL
THB: 12 G/DL (ref 11.5–16.5)
TIME ANALYZED: 1116
TOTAL PROTEIN: 6.8 G/DL (ref 6.4–8.3)
TRIGL SERPL-MCNC: 146 MG/DL (ref 0–149)
TROPONIN, HIGH SENSITIVITY: 42 NG/L (ref 0–11)
TROPONIN, HIGH SENSITIVITY: 44 NG/L (ref 0–11)
TROPONIN, HIGH SENSITIVITY: 44 NG/L (ref 0–11)
TSH SERPL DL<=0.05 MIU/L-ACNC: 2.59 UIU/ML (ref 0.27–4.2)
VLDLC SERPL CALC-MCNC: 29 MG/DL
WBC # BLD: 5.5 E9/L (ref 4.5–11.5)

## 2023-02-06 PROCEDURE — 84443 ASSAY THYROID STIM HORMONE: CPT

## 2023-02-06 PROCEDURE — 6360000002 HC RX W HCPCS: Performed by: NURSE PRACTITIONER

## 2023-02-06 PROCEDURE — 93010 ELECTROCARDIOGRAM REPORT: CPT | Performed by: INTERNAL MEDICINE

## 2023-02-06 PROCEDURE — G0378 HOSPITAL OBSERVATION PER HR: HCPCS

## 2023-02-06 PROCEDURE — 96375 TX/PRO/DX INJ NEW DRUG ADDON: CPT

## 2023-02-06 PROCEDURE — 85610 PROTHROMBIN TIME: CPT

## 2023-02-06 PROCEDURE — 71275 CT ANGIOGRAPHY CHEST: CPT

## 2023-02-06 PROCEDURE — 84484 ASSAY OF TROPONIN QUANT: CPT

## 2023-02-06 PROCEDURE — 83880 ASSAY OF NATRIURETIC PEPTIDE: CPT

## 2023-02-06 PROCEDURE — 6360000002 HC RX W HCPCS: Performed by: STUDENT IN AN ORGANIZED HEALTH CARE EDUCATION/TRAINING PROGRAM

## 2023-02-06 PROCEDURE — 71045 X-RAY EXAM CHEST 1 VIEW: CPT

## 2023-02-06 PROCEDURE — 6370000000 HC RX 637 (ALT 250 FOR IP): Performed by: STUDENT IN AN ORGANIZED HEALTH CARE EDUCATION/TRAINING PROGRAM

## 2023-02-06 PROCEDURE — 93005 ELECTROCARDIOGRAM TRACING: CPT

## 2023-02-06 PROCEDURE — 6370000000 HC RX 637 (ALT 250 FOR IP)

## 2023-02-06 PROCEDURE — 6360000004 HC RX CONTRAST MEDICATION: Performed by: RADIOLOGY

## 2023-02-06 PROCEDURE — 36415 COLL VENOUS BLD VENIPUNCTURE: CPT

## 2023-02-06 PROCEDURE — 80061 LIPID PANEL: CPT

## 2023-02-06 PROCEDURE — 99223 1ST HOSP IP/OBS HIGH 75: CPT | Performed by: INTERNAL MEDICINE

## 2023-02-06 PROCEDURE — 80053 COMPREHEN METABOLIC PANEL: CPT

## 2023-02-06 PROCEDURE — 83036 HEMOGLOBIN GLYCOSYLATED A1C: CPT

## 2023-02-06 PROCEDURE — 6360000002 HC RX W HCPCS

## 2023-02-06 PROCEDURE — 6370000000 HC RX 637 (ALT 250 FOR IP): Performed by: INTERNAL MEDICINE

## 2023-02-06 PROCEDURE — 82805 BLOOD GASES W/O2 SATURATION: CPT

## 2023-02-06 PROCEDURE — 85027 COMPLETE CBC AUTOMATED: CPT

## 2023-02-06 PROCEDURE — 2580000003 HC RX 258: Performed by: INTERNAL MEDICINE

## 2023-02-06 PROCEDURE — 96372 THER/PROPH/DIAG INJ SC/IM: CPT

## 2023-02-06 PROCEDURE — 99254 IP/OBS CNSLTJ NEW/EST MOD 60: CPT | Performed by: INTERNAL MEDICINE

## 2023-02-06 PROCEDURE — 99285 EMERGENCY DEPT VISIT HI MDM: CPT

## 2023-02-06 RX ORDER — SUCRALFATE 1 G/1
1 TABLET ORAL
Status: DISCONTINUED | OUTPATIENT
Start: 2023-02-06 | End: 2023-02-11 | Stop reason: HOSPADM

## 2023-02-06 RX ORDER — ONDANSETRON 2 MG/ML
4 INJECTION INTRAMUSCULAR; INTRAVENOUS EVERY 6 HOURS PRN
Status: DISCONTINUED | OUTPATIENT
Start: 2023-02-06 | End: 2023-02-11 | Stop reason: HOSPADM

## 2023-02-06 RX ORDER — METOPROLOL SUCCINATE 25 MG/1
25 TABLET, EXTENDED RELEASE ORAL DAILY
Status: DISCONTINUED | OUTPATIENT
Start: 2023-02-06 | End: 2023-02-11 | Stop reason: HOSPADM

## 2023-02-06 RX ORDER — ROSUVASTATIN CALCIUM 10 MG/1
10 TABLET, COATED ORAL EVERY MORNING
COMMUNITY

## 2023-02-06 RX ORDER — LANOLIN ALCOHOL/MO/W.PET/CERES
3 CREAM (GRAM) TOPICAL NIGHTLY PRN
Status: DISCONTINUED | OUTPATIENT
Start: 2023-02-06 | End: 2023-02-11 | Stop reason: HOSPADM

## 2023-02-06 RX ORDER — ONDANSETRON 4 MG/1
4 TABLET, ORALLY DISINTEGRATING ORAL EVERY 8 HOURS PRN
Status: DISCONTINUED | OUTPATIENT
Start: 2023-02-06 | End: 2023-02-11 | Stop reason: HOSPADM

## 2023-02-06 RX ORDER — HYDROXYZINE PAMOATE 25 MG/1
25 CAPSULE ORAL 3 TIMES DAILY PRN
Status: DISCONTINUED | OUTPATIENT
Start: 2023-02-06 | End: 2023-02-11 | Stop reason: HOSPADM

## 2023-02-06 RX ORDER — PANTOPRAZOLE SODIUM 40 MG/1
40 TABLET, DELAYED RELEASE ORAL
Status: DISCONTINUED | OUTPATIENT
Start: 2023-02-06 | End: 2023-02-11 | Stop reason: HOSPADM

## 2023-02-06 RX ORDER — AMLODIPINE BESYLATE 5 MG/1
5 TABLET ORAL DAILY
Status: DISCONTINUED | OUTPATIENT
Start: 2023-02-06 | End: 2023-02-11 | Stop reason: HOSPADM

## 2023-02-06 RX ORDER — KETOROLAC TROMETHAMINE 30 MG/ML
15 INJECTION, SOLUTION INTRAMUSCULAR; INTRAVENOUS ONCE
Status: COMPLETED | OUTPATIENT
Start: 2023-02-06 | End: 2023-02-06

## 2023-02-06 RX ORDER — ASPIRIN 81 MG/1
324 TABLET, CHEWABLE ORAL ONCE
Status: COMPLETED | OUTPATIENT
Start: 2023-02-06 | End: 2023-02-06

## 2023-02-06 RX ORDER — ASPIRIN 81 MG/1
81 TABLET ORAL DAILY
Status: DISCONTINUED | OUTPATIENT
Start: 2023-02-06 | End: 2023-02-11 | Stop reason: HOSPADM

## 2023-02-06 RX ORDER — SODIUM CHLORIDE 0.9 % (FLUSH) 0.9 %
5-40 SYRINGE (ML) INJECTION EVERY 12 HOURS SCHEDULED
Status: DISCONTINUED | OUTPATIENT
Start: 2023-02-06 | End: 2023-02-11 | Stop reason: HOSPADM

## 2023-02-06 RX ORDER — POTASSIUM CHLORIDE 20 MEQ/1
40 TABLET, EXTENDED RELEASE ORAL ONCE
Status: COMPLETED | OUTPATIENT
Start: 2023-02-06 | End: 2023-02-06

## 2023-02-06 RX ORDER — DULOXETIN HYDROCHLORIDE 30 MG/1
30 CAPSULE, DELAYED RELEASE ORAL DAILY
Status: DISCONTINUED | OUTPATIENT
Start: 2023-02-06 | End: 2023-02-11 | Stop reason: HOSPADM

## 2023-02-06 RX ORDER — ACETAMINOPHEN 325 MG/1
650 TABLET ORAL EVERY 6 HOURS PRN
Status: DISCONTINUED | OUTPATIENT
Start: 2023-02-06 | End: 2023-02-11 | Stop reason: HOSPADM

## 2023-02-06 RX ORDER — TAMSULOSIN HYDROCHLORIDE 0.4 MG/1
0.4 CAPSULE ORAL EVERY MORNING
COMMUNITY

## 2023-02-06 RX ORDER — ACETAMINOPHEN 650 MG/1
650 SUPPOSITORY RECTAL EVERY 6 HOURS PRN
Status: DISCONTINUED | OUTPATIENT
Start: 2023-02-06 | End: 2023-02-11 | Stop reason: HOSPADM

## 2023-02-06 RX ORDER — SODIUM CHLORIDE 9 MG/ML
INJECTION, SOLUTION INTRAVENOUS PRN
Status: DISCONTINUED | OUTPATIENT
Start: 2023-02-06 | End: 2023-02-11 | Stop reason: HOSPADM

## 2023-02-06 RX ORDER — SODIUM CHLORIDE 0.9 % (FLUSH) 0.9 %
5-40 SYRINGE (ML) INJECTION PRN
Status: DISCONTINUED | OUTPATIENT
Start: 2023-02-06 | End: 2023-02-11 | Stop reason: HOSPADM

## 2023-02-06 RX ORDER — ENOXAPARIN SODIUM 150 MG/ML
1 INJECTION SUBCUTANEOUS 2 TIMES DAILY
Status: DISCONTINUED | OUTPATIENT
Start: 2023-02-06 | End: 2023-02-08

## 2023-02-06 RX ORDER — WARFARIN SODIUM 2.5 MG/1
2.5 TABLET ORAL DAILY
Status: DISCONTINUED | OUTPATIENT
Start: 2023-02-06 | End: 2023-02-08

## 2023-02-06 RX ORDER — POLYETHYLENE GLYCOL 3350 17 G/17G
17 POWDER, FOR SOLUTION ORAL DAILY PRN
Status: DISCONTINUED | OUTPATIENT
Start: 2023-02-06 | End: 2023-02-11 | Stop reason: HOSPADM

## 2023-02-06 RX ORDER — NITROGLYCERIN 0.4 MG/1
0.4 TABLET SUBLINGUAL EVERY 5 MIN PRN
Status: DISCONTINUED | OUTPATIENT
Start: 2023-02-06 | End: 2023-02-11 | Stop reason: HOSPADM

## 2023-02-06 RX ORDER — WARFARIN SODIUM 2.5 MG/1
2.5 TABLET ORAL EVERY MORNING
Status: ON HOLD | COMMUNITY
End: 2023-02-11 | Stop reason: HOSPADM

## 2023-02-06 RX ORDER — POLYETHYLENE GLYCOL 3350 17 G
2 POWDER IN PACKET (EA) ORAL
Status: DISCONTINUED | OUTPATIENT
Start: 2023-02-06 | End: 2023-02-11 | Stop reason: HOSPADM

## 2023-02-06 RX ORDER — NITROGLYCERIN 0.4 MG/1
0.4 TABLET SUBLINGUAL ONCE
Status: COMPLETED | OUTPATIENT
Start: 2023-02-06 | End: 2023-02-06

## 2023-02-06 RX ADMIN — NITROGLYCERIN 0.4 MG: 0.4 TABLET SUBLINGUAL at 14:03

## 2023-02-06 RX ADMIN — IOPAMIDOL 75 ML: 755 INJECTION, SOLUTION INTRAVENOUS at 11:25

## 2023-02-06 RX ADMIN — KETOROLAC TROMETHAMINE 15 MG: 30 INJECTION, SOLUTION INTRAMUSCULAR; INTRAVENOUS at 18:28

## 2023-02-06 RX ADMIN — NITROGLYCERIN 0.4 MG: 0.4 TABLET SUBLINGUAL at 11:20

## 2023-02-06 RX ADMIN — METOPROLOL SUCCINATE 25 MG: 25 TABLET, FILM COATED, EXTENDED RELEASE ORAL at 14:03

## 2023-02-06 RX ADMIN — WARFARIN SODIUM 2.5 MG: 2.5 TABLET ORAL at 18:03

## 2023-02-06 RX ADMIN — Medication 3 MG: at 21:09

## 2023-02-06 RX ADMIN — AMLODIPINE BESYLATE 5 MG: 5 TABLET ORAL at 14:03

## 2023-02-06 RX ADMIN — NITROGLYCERIN 0.4 MG: 0.4 TABLET SUBLINGUAL at 21:07

## 2023-02-06 RX ADMIN — POTASSIUM CHLORIDE 40 MEQ: 1500 TABLET, EXTENDED RELEASE ORAL at 11:20

## 2023-02-06 RX ADMIN — NITROGLYCERIN 0.4 MG: 0.4 TABLET SUBLINGUAL at 10:13

## 2023-02-06 RX ADMIN — ACETAMINOPHEN 650 MG: 325 TABLET ORAL at 22:34

## 2023-02-06 RX ADMIN — ENOXAPARIN SODIUM 120 MG: 150 INJECTION SUBCUTANEOUS at 21:06

## 2023-02-06 RX ADMIN — SODIUM CHLORIDE, PRESERVATIVE FREE 10 ML: 5 INJECTION INTRAVENOUS at 21:10

## 2023-02-06 RX ADMIN — PANTOPRAZOLE SODIUM 40 MG: 40 TABLET, DELAYED RELEASE ORAL at 17:01

## 2023-02-06 RX ADMIN — ASPIRIN 81 MG CHEWABLE TABLET 324 MG: 81 TABLET CHEWABLE at 09:42

## 2023-02-06 RX ADMIN — SUCRALFATE 1 G: 1 TABLET ORAL at 17:01

## 2023-02-06 RX ADMIN — NITROGLYCERIN 0.4 MG: 0.4 TABLET SUBLINGUAL at 15:35

## 2023-02-06 RX ADMIN — NITROGLYCERIN 0.4 MG: 0.4 TABLET SUBLINGUAL at 22:34

## 2023-02-06 RX ADMIN — SUCRALFATE 1 G: 1 TABLET ORAL at 21:07

## 2023-02-06 RX ADMIN — DULOXETINE HYDROCHLORIDE 30 MG: 30 CAPSULE, DELAYED RELEASE ORAL at 14:03

## 2023-02-06 ASSESSMENT — PAIN DESCRIPTION - ONSET
ONSET: ON-GOING
ONSET: ON-GOING

## 2023-02-06 ASSESSMENT — PAIN DESCRIPTION - FREQUENCY
FREQUENCY: INTERMITTENT
FREQUENCY: INTERMITTENT
FREQUENCY: CONTINUOUS

## 2023-02-06 ASSESSMENT — PAIN SCALES - GENERAL
PAINLEVEL_OUTOF10: 7
PAINLEVEL_OUTOF10: 5
PAINLEVEL_OUTOF10: 7
PAINLEVEL_OUTOF10: 5
PAINLEVEL_OUTOF10: 6
PAINLEVEL_OUTOF10: 4
PAINLEVEL_OUTOF10: 5
PAINLEVEL_OUTOF10: 5
PAINLEVEL_OUTOF10: 4
PAINLEVEL_OUTOF10: 5
PAINLEVEL_OUTOF10: 7

## 2023-02-06 ASSESSMENT — PAIN DESCRIPTION - LOCATION
LOCATION: CHEST

## 2023-02-06 ASSESSMENT — PAIN - FUNCTIONAL ASSESSMENT
PAIN_FUNCTIONAL_ASSESSMENT: 0-10
PAIN_FUNCTIONAL_ASSESSMENT: ACTIVITIES ARE NOT PREVENTED
PAIN_FUNCTIONAL_ASSESSMENT: ACTIVITIES ARE NOT PREVENTED
PAIN_FUNCTIONAL_ASSESSMENT: 0-10
PAIN_FUNCTIONAL_ASSESSMENT: PREVENTS OR INTERFERES SOME ACTIVE ACTIVITIES AND ADLS

## 2023-02-06 ASSESSMENT — PAIN DESCRIPTION - ORIENTATION
ORIENTATION: LEFT
ORIENTATION: LEFT
ORIENTATION: MID

## 2023-02-06 ASSESSMENT — PAIN DESCRIPTION - DESCRIPTORS
DESCRIPTORS: DISCOMFORT
DESCRIPTORS: DISCOMFORT;POUNDING;SHARP
DESCRIPTORS: POUNDING;DISCOMFORT
DESCRIPTORS: POUNDING
DESCRIPTORS: DISCOMFORT;SHARP

## 2023-02-06 ASSESSMENT — PATIENT HEALTH QUESTIONNAIRE - PHQ9: SUM OF ALL RESPONSES TO PHQ QUESTIONS 1-9: 24

## 2023-02-06 ASSESSMENT — PAIN DESCRIPTION - PAIN TYPE
TYPE: ACUTE PAIN
TYPE: ACUTE PAIN

## 2023-02-06 ASSESSMENT — LIFESTYLE VARIABLES
HOW OFTEN DO YOU HAVE A DRINK CONTAINING ALCOHOL: 4 OR MORE TIMES A WEEK
HOW MANY STANDARD DRINKS CONTAINING ALCOHOL DO YOU HAVE ON A TYPICAL DAY: 7 TO 9

## 2023-02-06 NOTE — PROGRESS NOTES
Database initiated. Patient is A&O independent from home. States he uses no assistive devices and is RA at baseline. Pharmacy tech to verify home medications.

## 2023-02-06 NOTE — H&P
[facility] History and Physical      CHIEF COMPLAINT:  chest pain    History of Present Illness:      65year old male with past medical history of mechanical mitral and aortic valve, testicular cancer. H/o cva presents with substernal chest pain with shortness of breath that is worse with exertion. He states that it is also painful to palpation. Denies any nausea, vomiting, diaphoresis, or palpitations. Patient states that last stress test was in 5/22 which was unremarkable. Patient states that his symptoms have been ongoing for last couple of months and getting worse. Informant(s) for H&P: Patient, EMR    REVIEW OF SYSTEMS:  A comprehensive review of systems was negative except for: what is in the HPI      PMH:  Past Medical History:   Diagnosis Date    Alcoholic liver disease ([facility] [place] [address]     H/O prosthetic aortic valve replacement 09/2020    [facility] - main  Mitral and pacemaker at this time as well    History of mitral valve replacement 09/2020    at [place] [id number] - main  Aortic Valve placed at this time as well as pacemaker    Stroke [facility])     Testicular cancer ([facility] [place] [address]     in remission since 1988       Surgical History:  Past Surgical History:   Procedure Laterality Date    COLONOSCOPY N/A 8/24/2022    COLONOSCOPY POLYPECTOMY HOT BIOPSY performed by Gisselle Woods MD at [address] Fracture 2/11/3813     PACEMAKER PLACEMENT  09/2020    UPPER GASTROINTESTINAL ENDOSCOPY N/A 8/24/2022    EGD DIAGNOSTIC ONLY performed by Gisselle Woods MD at [facility]       Medications Prior to Admission:    Prior to Admission medications    Medication Sig Start Date End Date Taking?  Authorizing Provider   amLODIPine (NORVASC) 5 MG tablet TAKE 1 TABLET BY MOUTH EVERY DAY 11/14/22   Yue Millan DO   warfarin (COUMADIN) 2.5 MG tablet TAKE 1 TAB BY MOUTH DAILY ON TUESDAY THROUGH FRIDAY AND 2 TABS DAILY SATURDAY THROUGH [facility] 9/26/22   Baljinder Holm DO DULoxetine (CYMBALTA) 30 MG extended release capsule Take 1 capsule by mouth daily 9/7/22   Osman Segundo DO   aspirin 81 MG EC tablet Take 1 tablet by mouth daily 8/30/22   Zeke Henderson MD   metoprolol succinate (TOPROL XL) 25 MG extended release tablet Take 1 tablet by mouth daily 8/30/22   Zeke Henderson MD   nicotine polacrilex (COMMIT) 2 MG lozenge Take 1 lozenge by mouth every 2 hours as needed for Smoking cessation 8/29/22   Zeke Henderson MD   sucralfate (CARAFATE) 1 GM tablet Take 1 tablet by mouth 4 times daily 8/29/22   Zeke Henderson MD   pantoprazole (PROTONIX) 40 MG tablet Take 1 tablet by mouth 2 times daily (before meals) 8/29/22   Zeke Henderson MD       Allergies:    Patient has no known allergies. Social History:    reports that he has been smoking cigarettes. He has a 35.00 pack-year smoking history. He has never used smokeless tobacco. He reports current alcohol use. He reports that he does not use drugs. Family History:   family history is not on file.        PHYSICAL EXAM:  Vitals:  BP (!) 149/98   Pulse 90   Temp 97.9 °F (36.6 °C) (Oral)   Resp 28   Ht 5' 10\" (1.778 m)   Wt 256 lb (116.1 kg)   SpO2 98%   BMI 36.73 kg/m²     General Appearance: alert and oriented to person, place and time and in no acute distress  Skin: warm and dry  Head: normocephalic and atraumatic  Eyes: pupils equal, round, and reactive to light, extraocular eye movements intact, conjunctivae normal  Neck: neck supple and non tender without mass   Pulmonary/Chest: clear to auscultation bilaterally- no wheezes, rales or rhonchi, normal air movement, no respiratory distress  Cardiovascular: normal rate, normal S1 and S2 and no carotid bruits  Abdomen: soft, non-tender, non-distended, normal bowel sounds, no masses or organomegaly  Extremities: no cyanosis, no clubbing and no edema  Neurologic: no cranial nerve deficit and speech normal        LABS:  Recent Labs     02/06/23  0947      K 3.1*   CL 98   CO2 19*   BUN 7   CREATININE 1.4*   GLUCOSE 145*   CALCIUM 8.3*       Recent Labs     02/06/23  0947   WBC 5.5   RBC 4.17   HGB 12.4*   HCT 39.1   MCV 93.8   MCH 29.7   MCHC 31.7*   RDW 15.8*      MPV 11.7       No results for input(s): POCGLU in the last 72 hours. Radiology:   CTA PULMONARY W CONTRAST   Final Result   1. Limited CT pulmonary angiogram due to prominent respiratory motion   artifact and suboptimal arterial enhancement. 2. Within this limitation, no central embolus seen. 3. Clear lungs. 4. Cholelithiasis. RECOMMENDATIONS:   Unavailable         XR CHEST PORTABLE   Final Result   Postsurgical changes. Enlarged cardiomediastinal silhouette. Low lung   volumes and elevated right hemidiaphragm. EKG: Sinus tachycardia, rate 104, bifascilcular block, LVH ( difficult to read )    ASSESSMENT:      Principal Problem:    Chest pain  Resolved Problems:    * No resolved hospital problems. *      PLAN:    Acute typical chest pain - Patient responded to nitroglycerin. Continue nitro PRN, asa, and toprol. Will follow up with A1c, lipid panel, TSH, and echo. Cardiology consult appreciated  H/O prosthetic aortic and mitral valve replacement  H/O CVA  H/O Testicular cancer  H/O alcoholic liver disease  DVT prophylaxis - Coumadin      NOTE: This report was transcribed using voice recognition software. Every effort was made to ensure accuracy; however, inadvertent computerized transcription errors may be present.   Electronically signed by Arnaldo Fulton DO on 2/6/2023 at 12:52 PM

## 2023-02-06 NOTE — ED PROVIDER NOTES
[facility] INTERMEDIATE 1  EMERGENCY DEPARTMENT ENCOUNTER        Pt Name: Bernardo Ayala  MRN: [id number]  [place] 1965  Date of evaluation: 2/6/2023  Provider: Liliana Grigsby DO  PCP: JOE Payan  Note Started: 9:37 AM EST 2/6/23    CHIEF COMPLAINT       Chief Complaint   Patient presents with    Chest Pain       HISTORY OF PRESENT ILLNESS: 1 or more Elements   History From: patient    Limitations to history : None    Bernardo Ayala is a 65 y.o. male with PMH of dvt, CHF, aortic and mitral valve replacement currently on warfarin who presents with a complaint of left-sided chest pain ongoing since last night. He rates the pain a 7/10 and has not taken anything for relief. He states that the pain is nonradiating. He has associated shortness of breath and both his SOB and CP are exacerbated with walking. He states he has also noticed some leg swelling. He has not taken his warfarin for 2 weeks now because of a mix up in his prescriptions. Chronic Conditions: CHF, valve replacement    Nursing Notes were all reviewed and agreed with or any disagreements were addressed in the HPI.     REVIEW OF SYSTEMS :      Review of Systems    POSITIVE (+): CP, SOB  NEGATIVE (-): Fevers, chills, nausea, vomiting, diarrhea, constipation, back pain, syncope    SURGICAL HISTORY     Past Surgical History:   Procedure Laterality Date    COLONOSCOPY N/A 8/24/2022    COLONOSCOPY POLYPECTOMY HOT BIOPSY performed by Corin Lora MD at [address] Fracture 9/34/4136     PACEMAKER PLACEMENT  09/2020    UPPER GASTROINTESTINAL ENDOSCOPY N/A 8/24/2022    EGD DIAGNOSTIC ONLY performed by Corin Lora MD at [address] [facility]       Current Discharge Medication List        CONTINUE these medications which have NOT CHANGED    Details   sertraline (ZOLOFT) 50 MG tablet Take 50 mg by mouth every morning      warfarin (COUMADIN) 2.5 MG tablet Take 2.5 mg by mouth every morning      rosuvastatin (CRESTOR) 10 MG tablet Take 10 mg by mouth every morning      tamsulosin (FLOMAX) 0.4 MG capsule Take 0.4 mg by mouth every morning      nicotine polacrilex (COMMIT) 2 MG lozenge Take 1 lozenge by mouth every 2 hours as needed for Smoking cessation  Qty: 100 each, Refills: 3             ALLERGIES     Patient has no known allergies. FAMILYHISTORY     History reviewed. No pertinent family history. SOCIAL HISTORY       Social History     Tobacco Use    Smoking status: Every Day     Packs/day: 1.00     Years: 35.00     Pack years: 35.00     Types: Cigarettes    Smokeless tobacco: Never   Substance Use Topics    Alcohol use: Yes     Comment: occassional    Drug use: Never       SCREENINGS        [place] Coma Scale  Eye Opening: Spontaneous  Best Verbal Response: Oriented  Best Motor Response: Obeys commands  [place] Coma Scale Score: 15                CIWA Assessment  BP: (!) 160/96 (manual)  Heart Rate: 97  Nausea and Vomiting: intermittent nausea with dry heaves  Tactile Disturbances: none  Tremor: no tremor  Auditory Disturbances: not present  Paroxysmal Sweats: no sweat visible  Visual Disturbances: not present  Anxiety: mildly anxious  Headache, Fullness in Head: none present  Agitation: normal activity  Orientation and Clouding of Sensorium: oriented and can do serial additions  CIWA-Ar Total: 5           PHYSICAL EXAM  1 or more Elements     ED Triage Vitals [02/06/23 0917]   BP Temp Temp Source Heart Rate Resp SpO2 Height Weight   (!) 120/95 97.7 °F (36.5 °C) Oral (!) 112 24 100 % 5' 10\" (1.778 m) 256 lb (116.1 kg)       Physical Exam  Constitutional:       Comments: Moderately diaphoretic   HENT:      Head: Normocephalic. Mouth/Throat:      Mouth: Mucous membranes are moist.   Eyes:      Pupils: Pupils are equal, round, and reactive to light. Cardiovascular:      Rate and Rhythm: Tachycardia present. Pulses: Normal pulses.    Pulmonary:      Effort: Pulmonary effort is normal. No respiratory distress. Comments: Appears somewhat tachypneic  Abdominal:      Tenderness: There is no abdominal tenderness. Neurological:      Mental Status: He is alert. DIAGNOSTIC RESULTS   LABS:    Labs Reviewed   CBC - Abnormal; Notable for the following components:       Result Value    Hemoglobin 12.4 (*)     MCHC 31.7 (*)     RDW 15.8 (*)     All other components within normal limits   COMPREHENSIVE METABOLIC PANEL - Abnormal; Notable for the following components:    Potassium 3.1 (*)     CO2 19 (*)     Glucose 145 (*)     Creatinine 1.4 (*)     Calcium 8.3 (*)     Alkaline Phosphatase 131 (*)     ALT 46 (*)     AST 48 (*)     All other components within normal limits   TROPONIN - Abnormal; Notable for the following components:    Troponin, High Sensitivity 44 (*)     All other components within normal limits   TROPONIN - Abnormal; Notable for the following components:    Troponin, High Sensitivity 44 (*)     All other components within normal limits   PROTIME-INR - Abnormal; Notable for the following components:    Protime 20.2 (*)     All other components within normal limits   BRAIN NATRIURETIC PEPTIDE - Abnormal; Notable for the following components:    Pro- (*)     All other components within normal limits   TROPONIN - Abnormal; Notable for the following components:    Troponin, High Sensitivity 42 (*)     All other components within normal limits   BLOOD GAS, ARTERIAL - Abnormal; Notable for the following components:    PCO2 27.8 (*)     HCO3 18.4 (*)     B.E. -4.5 (*)     COHb 2.9 (*)     All other components within normal limits   LIPID PANEL   TSH   ARTERIAL BLOOD GAS, RESPIRATORY ONLY   HEMOGLOBIN A1C       When ordered only abnormal lab results are displayed. All other labs were within normal range or not returned as of this dictation. EKG:  EKG: This EKG is signed by emergency department physician.     Rate: 104  Rhythm: sinus tachycardia  Interpretation: no acute changes, no ST or T wave abnormalities concerning for new ischemia. RBBB   Comparison: stable as compared to patient's most recent EKG       RADIOLOGY:   Non-plain film images such as CT, Ultrasound and MRI are read by the radiologist. Plain radiographic images are visualized and preliminarily interpreted by the ED Provider with the below findings:    CXR: elevated R hemidiaphram, no infiltrate   CTA PULM: No PE noted but there is motion artifact    Interpretation per the Radiologist below, if available at the time of this note:    Discussed with Radiologist: no    CTA PULMONARY W CONTRAST   Final Result   1. Limited CT pulmonary angiogram due to prominent respiratory motion   artifact and suboptimal arterial enhancement. 2. Within this limitation, no central embolus seen. 3. Clear lungs. 4. Cholelithiasis. RECOMMENDATIONS:   Unavailable         XR CHEST PORTABLE   Final Result   Postsurgical changes. Enlarged cardiomediastinal silhouette. Low lung   volumes and elevated right hemidiaphragm. No results found. No results found. PROCEDURES   Unless otherwise noted below, none     Procedures      PAST MEDICAL HISTORY      has a past medical history of Alcoholic liver disease ([facility] [address] H/O prosthetic aortic valve replacement (09/2020), History of mitral valve replacement (09/2020), Stroke [facility]), and Testicular cancer ([facility] [address]      EMERGENCY DEPARTMENT COURSE and DIFFERENTIAL DIAGNOSIS/MDM:   Vitals:    Vitals:    02/06/23 1159 02/06/23 1246 02/06/23 1340 02/06/23 1534   BP: 139/84 (!) 149/98 (!) 168/100 (!) 160/96   Pulse: 94 90 90 97   Resp: 24 28 24    Temp:  97.9 °F (36.6 °C) 98.8 °F (37.1 °C)    TempSrc:  Oral Oral    SpO2: 97% 98% 100%    Weight:       Height:           Patient was given the following medications:  Medications   amLODIPine (NORVASC) tablet 5 mg (5 mg Oral Given 2/6/23 1403)   aspirin EC tablet 81 mg (81 mg Oral Not Given 2/6/23 1409) DULoxetine (CYMBALTA) extended release capsule 30 mg (30 mg Oral Given 2/6/23 1403)   metoprolol succinate (TOPROL XL) extended release tablet 25 mg (25 mg Oral Given 2/6/23 1403)   nicotine polacrilex (COMMIT) lozenge 2 mg (has no administration in time range)   pantoprazole (PROTONIX) tablet 40 mg (has no administration in time range)   sucralfate (CARAFATE) tablet 1 g (has no administration in time range)   warfarin (COUMADIN) tablet 2.5 mg (has no administration in time range)   sodium chloride flush 0.9 % injection 5-40 mL (has no administration in time range)   sodium chloride flush 0.9 % injection 5-40 mL (has no administration in time range)   0.9 % sodium chloride infusion (has no administration in time range)   ondansetron (ZOFRAN-ODT) disintegrating tablet 4 mg (has no administration in time range)     Or   ondansetron (ZOFRAN) injection 4 mg (has no administration in time range)   polyethylene glycol (GLYCOLAX) packet 17 g (has no administration in time range)   acetaminophen (TYLENOL) tablet 650 mg (has no administration in time range)     Or   acetaminophen (TYLENOL) suppository 650 mg (has no administration in time range)   perflutren lipid microspheres (DEFINITY) injection 1.5 mL (has no administration in time range)   nitroGLYCERIN (NITROSTAT) SL tablet 0.4 mg (0.4 mg SubLINGual Given 2/6/23 1535)   enoxaparin (LOVENOX) injection 120 mg (has no administration in time range)   aspirin chewable tablet 324 mg (324 mg Oral Given 2/6/23 0942)   nitroGLYCERIN (NITROSTAT) SL tablet 0.4 mg (0.4 mg SubLINGual Given 2/6/23 1013)   potassium chloride (KLOR-CON M) extended release tablet 40 mEq (40 mEq Oral Given 2/6/23 1120)   nitroGLYCERIN (NITROSTAT) SL tablet 0.4 mg (0.4 mg SubLINGual Given 2/6/23 1120)   iopamidol (ISOVUE-370) 76 % injection 75 mL (75 mLs IntraVENous Given 2/6/23 1125)       ED Course as of 02/06/23 1546   Mon Feb 06, 2023   1109 Patient reevaluated and he experienced some relief from his sublingual nitro. Will give additional nitro because pain is now returning. [CB]      ED Course User Index  [CB] Bucksstephy Rubi, DO          CC/HPI Summary, DDx, ED Course, and Reassessment:  Ruby Gonzales is a 65 y.o. male with PMH of dvt, CHF, aortic and mitral valve replacement currently on warfarin who presents with a complaint of left-sided chest pain ongoing since last night. On arrival patient is in no acute distress. He is tachycardic otherwise has stable vital signs. Examination revealed a male patient who is moderately diaphoretic describing chest pain as a 7/10. Heart and lung sounds are normal and equal.  Pulses are equal bilaterally. DDX includes MI, CAD, CHF, PE No lower extremity swelling or unilateral leg swelling. Labs indicated a delta negative troponin level of 44 then 42, BNP of 246 and a potassium of 3.1. CXR and CTA pulmonary were negative for acute findings such as infiltrate or pulmonary embolism. He was given p.o. potassium replenishment. He was given 2 different rounds of nitro sublingual which relieved his pain both times. He was given aspirin 324. Due to his history of aortic and mitral valve replacement, chest pain relieved by nitro and multiple risk factors including daily smoking patient was admitted for further cardiac evaluation. CONSULTS: (Who and What was discussed)  IP CONSULT TO CARDIOLOGY  Internal medicine     Social Determinants : None      Records Reviewed (source and summary): Stress test from 05/28/22 showing an EF of 54%    Disposition Considerations (include 1 Tests not done, Admit vs D/C, Shared Decision Making, Pt Expectation of Test or Tx.): CT abd and pelvis was not performed because he had no abdominal tenderness       I am the Primary Clinician of Record. FINAL IMPRESSION      1.  Chest pain, unspecified type          DISPOSITION/PLAN     DISPOSITION Admitted 02/06/2023 12:50:28 PM      PATIENT REFERRED TO:  No follow-up provider specified.     DISCHARGE MEDICATIONS:  Current Discharge Medication List          DISCONTINUED MEDICATIONS:  Current Discharge Medication List        STOP taking these medications       amLODIPine (NORVASC) 5 MG tablet Comments:   Reason for Stopping:         DULoxetine (CYMBALTA) 30 MG extended release capsule Comments:   Reason for Stopping:         aspirin 81 MG EC tablet Comments:   Reason for Stopping:         metoprolol succinate (TOPROL XL) 25 MG extended release tablet Comments:   Reason for Stopping:         sucralfate (CARAFATE) 1 GM tablet Comments:   Reason for Stopping:         pantoprazole (PROTONIX) 40 MG tablet Comments:   Reason for Stopping:                      (Please note that portions of this note were completed with a voice recognition program.  Efforts were made to edit the dictations but occasionally words are mis-transcribed.)    Olga Lidia Cuenca DO (electronically signed)           Olga Lidia Cuenca DO  Resident  02/06/23 [id number]

## 2023-02-06 NOTE — CONSULTS
Inpatient Cardiology Consultation      Reason for Consult: Chest pain    Consulting Physician: Dr. Romy Lopes    Requesting Physician:  Dr. Guzman Be    Date of Consultation: 2/6/2023    HISTORY OF PRESENT ILLNESS:     This 67-year-old male is known to [facility] cardiology and is followed by Dr. Trevin Sequeira. He has a history of coronary artery disease and valvular heart disease and is status post CABG and he has 2 mechanical heart valves. He is noncompliant with Coumadin. He also has a permanent pacemaker. He was last evaluated by Dee Richardson during a hospital consultation for elevated troponin when he was admitted for an EGD and colonoscopy. He was cleared for the upcoming endoscopies. He has a history of chronically elevated troponin. He was to transition back to Coumadin after his endoscopies and he was bridged with heparin. A 2D echo was not done. Stress test was unremarkable. He tells me he has been drinking about 5 \"loco \"a day. He also continues to smoke and is not taking any medications. Yesterday he developed chest pain that lasted intermittently all through the night. It was a pounding in his chest and he was short of breath. He was also diaphoretic. He presented to the emergency room today. He tells me he has taken none of his medications for a couple of weeks. He tells me he has been drinking about 5 alcoholic beverages daily. He continues to smoke. He has no chest pain with exertion. Yesterday he was sitting and suddenly developed an intermittent pounding in the center of his chest.  The discomfort lasted all night. He was short of breath but states he has been that way for a while. He was diaphoretic and his friend brought him to the emergency room this morning. Blood pressure on admission 120/95 with a heart rate of 112 he was afebrile with no hypoxia on room air.     EKG on admission sinus tachycardia with a right bundle branch block, left anterior fascicular block and an age undetermined septal infarct    Chest x-ray showed poststernotomy and pacemaker on the right with no pneumothorax or pleural effusion seen. CTA pulmonary was limited due to prominent respiratory motion and suboptimal arterial enhancement but no central embolus seen and lungs were clear . 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  9/10/2020 [facility]: Severe MR with restricted A1, A2, P2 scallops. 2 regurgiant jets. Mild functional MS. Mildly elevated gradient most likely due to mR. Valve arewa 4.1 cm. Moderate to severe AI. NORA 1.5 cm. Small, highly mobile artheroma is seen in proximal descending thoracic aorta. EF 55-60%. 9/11/2020 Bilateral carotid Dopplers: 1-39% bilateral carotid stenosis  9/11/2020 [facility] JOSE LAD mid vessel 95% stenosis. LCx posterolateral extension 70% stenosis  9/14/2020 @  LIMA-LAD, AVR (#23 [employer] bileaflet mechanical) and MVR (#29 [place] bileaflet mechanical valve)  OAC with coumadin INR 2.5-3.5/noncompliance with Coumadin  9/17/2020 Post-op TTE: EF 55-60%. Abnormal septal motion consistent with postoperative status. DD. Bileaflet mechanical AV present  9/18/2020 Developed CHB post-op EP was consulted  9/22/2020 Medtronic Dual Chamber PPM inserted ().  9/26/2020 Discharge from [facility] on ASA 81 mg QD, coumadin, Lipitor 80 mg QD, lopressor 25 mg BID,   11/5/2020 [facility]: EF 55-60%.  Abnormal septal motion consistent with post-operative status. #29 [place] bileaflet mechanical valve. MV gradient 5.4 mmHg and trival prosthetic MR. Mildly elevated RVSP (35.3 mmHg). Mild to moderate TR. #23 [place] bileaflet mechanical aortic valve with gradients 21.9/12.3 mmHg and DI of 0.53 and trivial AI. When compared to previous TTE AV gradients are similar. No MV gradients were obtained on previous TTE. Hospital admission March 24, 2022 with chest pain and elevated troponin, no acute coronary syndrome and patient was in FRANTZ, troponins 61-54-40  Medication non-compliance (Coumadin)  Chronic RBBB/LAFB  Chronically elevated high-sensitivity troponins that were in the 60s and 70s in 2022  CKD. Recent Cr Baseline 1.4 - 1.7  Admission [facility] on 5/11/2022 with complaints of right leg pain / right hip pain / left sided chest pain that reportedly was traveling to his left shoulder, associated with shortness of breath. Was noted to have retroperitoneal hemorrhage in the setting of a supratherapeutic INR (>10). Was also noted to be hypokalemic. Was started on LR at 76 cc/h continuous. Cardiology was consulted for spontaneous retroperitoneal hematoma in the setting of a supratherapeutic INR (not actively bleeding at that time). Plans to reverse INR with IV vitamin K and order Kcentra. +Hemoccult stool. Cardiology recommendations to resume Coumadin when INR improves (Goal INR 2.5-3.5). ---> Cardiology signed off. Patient was given 4U FFP. Coumadin was resumed on 5/14/2022. INR 1.5 (5/18/2022). Patient was discharged on 5/20/2022 with Lovenox bridging to coumadin. Patient was discharged to home with nurse. [facility] 5/28/2022 for chest pain. Thought to be atypical.  Chronically elevated troponin. Underwent Lexiscan stress test, discharged home in stable condition. Lexiscan Stress Test 5/2022: Normal MPI with attenuation artifact. LVEF 54% with normal wall motion.        Medications Prior to admit:  Prior to Admission medications    Medication Sig Start Date End Date Taking?  Authorizing Provider   sertraline (ZOLOFT) 50 MG tablet Take 50 mg by mouth every morning   Yes Historical Provider, MD   warfarin (COUMADIN) 2.5 MG tablet Take 2.5 mg by mouth every morning   Yes Historical Provider, MD   rosuvastatin (CRESTOR) 10 MG tablet Take 10 mg by mouth every morning   Yes Historical Provider, MD   tamsulosin (FLOMAX) 0.4 MG capsule Take 0.4 mg by mouth every morning   Yes Historical Provider, MD   nicotine polacrilex (COMMIT) 2 MG lozenge Take 1 lozenge by mouth every 2 hours as needed for Smoking cessation 8/29/22   Tim Jarquin MD       Current Medications:    Current Facility-Administered Medications: amLODIPine (NORVASC) tablet 5 mg, 5 mg, Oral, Daily  aspirin EC tablet 81 mg, 81 mg, Oral, Daily  DULoxetine (CYMBALTA) extended release capsule 30 mg, 30 mg, Oral, Daily  metoprolol succinate (TOPROL XL) extended release tablet 25 mg, 25 mg, Oral, Daily  nicotine polacrilex (COMMIT) lozenge 2 mg, 2 mg, Oral, Q2H PRN  pantoprazole (PROTONIX) tablet 40 mg, 40 mg, Oral, BID AC  sucralfate (CARAFATE) tablet 1 g, 1 g, Oral, 4x Daily AC & HS  warfarin (COUMADIN) tablet 2.5 mg, 2.5 mg, Oral, Daily  sodium chloride flush 0.9 % injection 5-40 mL, 5-40 mL, IntraVENous, 2 times per day  sodium chloride flush 0.9 % injection 5-40 mL, 5-40 mL, IntraVENous, PRN  0.9 % sodium chloride infusion, , IntraVENous, PRN  ondansetron (ZOFRAN-ODT) disintegrating tablet 4 mg, 4 mg, Oral, Q8H PRN **OR** ondansetron (ZOFRAN) injection 4 mg, 4 mg, IntraVENous, Q6H PRN  polyethylene glycol (GLYCOLAX) packet 17 g, 17 g, Oral, Daily PRN  acetaminophen (TYLENOL) tablet 650 mg, 650 mg, Oral, Q6H PRN **OR** acetaminophen (TYLENOL) suppository 650 mg, 650 mg, Rectal, Q6H PRN  perflutren lipid microspheres (DEFINITY) injection 1.5 mL, 1.5 mL, IntraVENous, ONCE PRN  nitroGLYCERIN (NITROSTAT) SL tablet 0.4 mg, 0.4 mg, SubLINGual, Q5 Min PRN    Allergies:  Patient has no known allergies. Social History: History of tobacco abuse 1 pack a day for 20 years and history of alcohol abuse        Family History: FAMILY HISTORY:   Mother  age 70 MI. No reported PCI/CABG. No reported PPM/ICD. No DM  Biological Father unknown arias history  No full siblings  History reviewed. No pertinent family history. REVIEW OF SYSTEMS:     Constitutional: Denies fatigue, fevers, chills or night sweats  Eyes: Denies visual changes or drainage  ENT: Denies headaches or hearing loss. No mouth sores or sore throat. No epistaxis   Cardiovascular: Denies chest pain, pressure or palpitations. No lower extremity swelling. Respiratory: Denies PATEL, cough, orthopnea or PND. No hemoptysis   Gastrointestinal: Denies hematemesis or anorexia. No hematochezia or melena    Genitourinary: Denies urgency, dysuria or hematuria. Musculoskeletal: Denies gait disturbance, weakness or joint complaints  Integumentary: Denies rash, hives or pruritis   Neurological: Denies dizziness, headaches or seizures. No numbness or tingling  Psychiatric: Denies anxiety or depression. Endocrine: Denies temperature intolerance. No recent weight change. .  Hematologic/Lymphatic: Denies abnormal bruising or bleeding. No swollen lymph nodes    PHYSICAL EXAM:   BP (!) 168/100 Comment: MANUAL  Pulse 90   Temp 98.8 °F (37.1 °C) (Oral)   Resp 24   Ht 5' 10\" (1.778 m)   Wt 256 lb (116.1 kg)   SpO2 100%   BMI 36.73 kg/m²   CONST:  Well developed, well nourished who appears of stated age. Awake, alert and cooperative. No apparent distress. HEENT:   Head- Normocephalic, atraumatic   Eyes- Conjunctivae pink, anicteric  Throat- Oral mucosa pink and moist  Neck-  No stridor, trachea midline, no jugular venous distention. No carotid bruit. CHEST: Chest symmetrical and non-tender to palpation.  No accessory muscle use or intercostal retractions  RESPIRATORY: Lung sounds - clear throughout fields CARDIOVASCULAR:     Heart Inspection- shows no noted pulsations  Heart Palpation- no heaves or thrills; PMI is non-displaced   Heart Ausculation- Regular rate and rhythm, positive for crisp heart valves no murmur. No s3, s4 or rub   PV: No lower extremity edema. No varicosities. Pedal pulses palpable, no clubbing or cyanosis   ABDOMEN: Soft, non-tender to light palpation. Bowel sounds present. No palpable masses no organomegaly; no abdominal bruit  MS: Good muscle strength and tone. No atrophy or abnormal movements. : Deferred  SKIN: Warm and dry no statis dermatitis or ulcers   NEURO / PSYCH: Oriented to person, place and time. Speech clear and appropriate. Follows all commands. Pleasant affect     DATA:    ECG / Tele strips: Rhythm sign  Diagnostic:    No intake or output data in the 24 hours ending 02/06/23 1432    Labs:   CBC:   Recent Labs     02/06/23  0947   WBC 5.5   HGB 12.4*   HCT 39.1        BMP:   Recent Labs     02/06/23  0947      K 3.1*   CO2 19*   BUN 7   CREATININE 1.4*   LABGLOM 59   CALCIUM 8.3*     Mag: No results for input(s): MG in the last 72 hours. Phos: No results for input(s): PHOS in the last 72 hours. TFT:   Lab Results   Component Value Date    TSH 1.990 05/12/2022    T4FREE 1.01 03/24/2022      HgA1c:   Lab Results   Component Value Date/Time    LABA1C 5.2 03/24/2022 12:36 AM     No results found for: EAG  proBNP:   Lab Results   Component Value Date/Time    PROBNP 246 02/06/2023 09:47 AM    PROBNP 724 08/29/2022 10:09 AM    PROBNP 373 08/22/2022 11:31 AM    PROBNP 435 05/28/2022 01:10 AM    PROBNP 792 05/11/2022 02:35 PM     PT/INR:   Recent Labs     02/06/23  0947   PROTIME 20.2*   INR 1.8     APTT:No results for input(s): APTT in the last 72 hours.   TROPONIN:  Lab Results   Component Value Date/Time    TROPHS 42 02/06/2023 10:43 AM    TROPHS 44 02/06/2023 09:47 AM    TROPHS 44 02/06/2023 09:47 AM    TROPHS 71 08/22/2022 01:42 PM    TROPHS 73 08/22/2022 11:31 AM CK:No results found for: CKTOTAL  FASTING LIPID PANEL:  Lab Results   Component Value Date/Time    CHOL 86 03/24/2022 05:31 AM    HDL 28 03/24/2022 05:31 AM    LDLCALC 28 03/24/2022 05:31 AM    TRIG 149 03/24/2022 05:31 AM     LIVER PROFILE:  Recent Labs     02/06/23  0947   AST 48*   ALT 46*   LABALBU 3.7     COVID19: No results for input(s): COVID19 in the last 72 hours. Impressions discussed with   Atypical chest pain with no acute EKG changes and mildly elevated but flat high-sensitivity troponins  Tobacco and alcohol abuse  Valvular heart disease and status post AVR with a #23 [place] bileaflet mechanical and MVR with a #29 [place] mechanical valve in September 2020 and chronic Coumadin therapy with admitted noncompliance to Coumadin therapeutic INR of 1.8 on admission  Coronary artery disease and status post LIMA to the LAD at the time of the AVR and MVR (left heart cath in September 2020 also showing a 70% circumflex stenosis which was not intervened upon)/nonischemic Lexiscan stress test in May 2022  Noncompliance with medication  Hypertension is uncontrolled due to noncompliance  Hyperlipidemia  History of CVA in 2015  History of testicular carcinoma status postchemotherapy  History of Medtronic dual-chamber pacemaker in September 2020 for post op complete heart block  Chronic right bundle branch block and left anterior fascicular block  Chronic kidney disease  History of chronically high sensitivity elevated troponins    Plans  Interrogate pacemaker  2D echo will be reviewed  Therapeutic Lovenox until his INR is 2.5  Coumadin will be titrated per PCP  Counseled on medication compliance and tobacco cessation and alcohol cessation  Electronically signed by Sherryle Lais, APRN - CNP on 2/6/2023 at 2:32 PM    ______________________________________________________________________  Cardiology attending attestation:  I have independently interviewed and examined the patient.   I personally reviewed pertinent laboratory values and diagnostic testing (including, if applicable, chest xray, electrocardiogram, telemetry, echocardiogram, stress testing, and coronary angiography). I have reviewed the above documentation completed by Lennox Hau, APRN-CNP including past medical, social, and family history and agree with the findings, assessment and plan except where noted. Plan formulated by me. I participated in all aspects of the medical decision making and performed the substantive portion of the visit by contributing >50% of the total visit time. Please see my additional contributions to the HPI, physical exam, and assessment / medical decision making:  _______________________________________________________________________    61-year-old male with poor insight into his medical conditions presenting with chest pain. Has mechanical mitral and aortic valves and is noncompliant with all medications including anticoagulation. Presenting with chest pain that feels like a pounding, worse with deep breathing, short of breath with exertion. Flat troponin not consistent with ACS. EKG with chronic RBBB/LAFB. Physical Exam:  BP (!) 160/96 Comment: manual  Pulse 97   Temp 98.8 °F (37.1 °C) (Oral)   Resp 24   Ht 5' 10\" (1.778 m)   Wt 256 lb (116.1 kg)   SpO2 100%   BMI 36.73 kg/m²   General appearance: Obese male, awake, alert, visibly appears somewhat dyspneic  Skin: Intact, no rash  ENMT: Moist mucous membranes  Neck: Supple, no carotid bruits. Normal jugular venous pressure  Lungs: Clear to auscultation bilaterally. No wheezes, rales, or rhonchi  Cardiac: Regular rhythm with a normal rate, mechanical S1&S2, no murmurs apparent. Sternotomy scar. Left chest pacemaker. chest wall tender to palpation.   Abdomen: Soft, positive bowel sounds, nontender  Extremities: Moves all extremities x 4, no lower extremity edema  Neurologic: No focal motor deficits apparent, normal mood and affect    EK2023: Sinus tachycardia 104 bpm.  RBBB/LAFB QRS ration 160 ms    Impression:   Atypical chest pain. Appears noncardiac likely musculoskeletal  Mechanical [place] AVR and MVR , noncompliant with warfarin  Single-vessel CABG LIMA-LAD at time of valve surgery    Recommendations:    Trial NSAIDs for atypical chest pain  Enoxaparin until INR therapeutic 2.5-3.5  Interrogate pacemaker  Check echo  Reiterated importance of compliance with anticoagulation    Thank you for the consultation. Please do not hesitate to call with questions.     Henrry Lal MD, [address] [facility]

## 2023-02-07 LAB
ADENOVIRUS BY PCR: NOT DETECTED
ANION GAP SERPL CALCULATED.3IONS-SCNC: 10 MMOL/L (ref 7–16)
BORDETELLA PARAPERTUSSIS BY PCR: NOT DETECTED
BORDETELLA PERTUSSIS BY PCR: NOT DETECTED
BUN BLDV-MCNC: 12 MG/DL (ref 6–20)
C-REACTIVE PROTEIN: 0.5 MG/DL (ref 0–0.4)
CALCIUM SERPL-MCNC: 8.6 MG/DL (ref 8.6–10.2)
CHLAMYDOPHILIA PNEUMONIAE BY PCR: NOT DETECTED
CHLORIDE BLD-SCNC: 104 MMOL/L (ref 98–107)
CO2: 22 MMOL/L (ref 22–29)
CORONAVIRUS 229E BY PCR: NOT DETECTED
CORONAVIRUS HKU1 BY PCR: NOT DETECTED
CORONAVIRUS NL63 BY PCR: NOT DETECTED
CORONAVIRUS OC43 BY PCR: NOT DETECTED
CREAT SERPL-MCNC: 1.6 MG/DL (ref 0.7–1.2)
GFR SERPL CREATININE-BSD FRML MDRD: 50 ML/MIN/1.73
GLUCOSE BLD-MCNC: 106 MG/DL (ref 74–99)
HCT VFR BLD CALC: 36.9 % (ref 37–54)
HEMOGLOBIN: 11.7 G/DL (ref 12.5–16.5)
HUMAN METAPNEUMOVIRUS BY PCR: NOT DETECTED
HUMAN RHINOVIRUS/ENTEROVIRUS BY PCR: NOT DETECTED
INFLUENZA A BY PCR: NOT DETECTED
INFLUENZA B BY PCR: NOT DETECTED
INR BLD: 1.7
LV EF: 55 %
LVEF MODALITY: NORMAL
MCH RBC QN AUTO: 29.8 PG (ref 26–35)
MCHC RBC AUTO-ENTMCNC: 31.7 % (ref 32–34.5)
MCV RBC AUTO: 93.9 FL (ref 80–99.9)
MYCOPLASMA PNEUMONIAE BY PCR: NOT DETECTED
PARAINFLUENZA VIRUS 1 BY PCR: NOT DETECTED
PARAINFLUENZA VIRUS 2 BY PCR: NOT DETECTED
PARAINFLUENZA VIRUS 3 BY PCR: NOT DETECTED
PARAINFLUENZA VIRUS 4 BY PCR: NOT DETECTED
PDW BLD-RTO: 15.8 FL (ref 11.5–15)
PLATELET # BLD: 156 E9/L (ref 130–450)
PMV BLD AUTO: 12 FL (ref 7–12)
POTASSIUM SERPL-SCNC: 3.9 MMOL/L (ref 3.5–5)
PROTHROMBIN TIME: 18.3 SEC (ref 9.3–12.4)
RBC # BLD: 3.93 E12/L (ref 3.8–5.8)
RESPIRATORY SYNCYTIAL VIRUS BY PCR: NOT DETECTED
SARS-COV-2, PCR: NOT DETECTED
SEDIMENTATION RATE, ERYTHROCYTE: 10 MM/HR (ref 0–15)
SODIUM BLD-SCNC: 136 MMOL/L (ref 132–146)
WBC # BLD: 5.5 E9/L (ref 4.5–11.5)

## 2023-02-07 PROCEDURE — G0378 HOSPITAL OBSERVATION PER HR: HCPCS

## 2023-02-07 PROCEDURE — 6360000002 HC RX W HCPCS

## 2023-02-07 PROCEDURE — 6360000004 HC RX CONTRAST MEDICATION: Performed by: INTERNAL MEDICINE

## 2023-02-07 PROCEDURE — 86140 C-REACTIVE PROTEIN: CPT

## 2023-02-07 PROCEDURE — 85610 PROTHROMBIN TIME: CPT

## 2023-02-07 PROCEDURE — 36415 COLL VENOUS BLD VENIPUNCTURE: CPT

## 2023-02-07 PROCEDURE — 85027 COMPLETE CBC AUTOMATED: CPT

## 2023-02-07 PROCEDURE — 96375 TX/PRO/DX INJ NEW DRUG ADDON: CPT

## 2023-02-07 PROCEDURE — 99233 SBSQ HOSP IP/OBS HIGH 50: CPT | Performed by: INTERNAL MEDICINE

## 2023-02-07 PROCEDURE — 0202U NFCT DS 22 TRGT SARS-COV-2: CPT

## 2023-02-07 PROCEDURE — 6370000000 HC RX 637 (ALT 250 FOR IP): Performed by: STUDENT IN AN ORGANIZED HEALTH CARE EDUCATION/TRAINING PROGRAM

## 2023-02-07 PROCEDURE — 85651 RBC SED RATE NONAUTOMATED: CPT

## 2023-02-07 PROCEDURE — 6360000002 HC RX W HCPCS: Performed by: NURSE PRACTITIONER

## 2023-02-07 PROCEDURE — 93306 TTE W/DOPPLER COMPLETE: CPT

## 2023-02-07 PROCEDURE — 96376 TX/PRO/DX INJ SAME DRUG ADON: CPT

## 2023-02-07 PROCEDURE — 6370000000 HC RX 637 (ALT 250 FOR IP): Performed by: INTERNAL MEDICINE

## 2023-02-07 PROCEDURE — 96372 THER/PROPH/DIAG INJ SC/IM: CPT

## 2023-02-07 PROCEDURE — 2580000003 HC RX 258: Performed by: INTERNAL MEDICINE

## 2023-02-07 PROCEDURE — 80048 BASIC METABOLIC PNL TOTAL CA: CPT

## 2023-02-07 PROCEDURE — 6360000002 HC RX W HCPCS: Performed by: INTERNAL MEDICINE

## 2023-02-07 RX ORDER — FUROSEMIDE 10 MG/ML
40 INJECTION INTRAMUSCULAR; INTRAVENOUS ONCE
Status: COMPLETED | OUTPATIENT
Start: 2023-02-07 | End: 2023-02-07

## 2023-02-07 RX ORDER — MORPHINE SULFATE 2 MG/ML
2 INJECTION, SOLUTION INTRAMUSCULAR; INTRAVENOUS EVERY 4 HOURS PRN
Status: DISCONTINUED | OUTPATIENT
Start: 2023-02-07 | End: 2023-02-10

## 2023-02-07 RX ADMIN — PANTOPRAZOLE SODIUM 40 MG: 40 TABLET, DELAYED RELEASE ORAL at 06:04

## 2023-02-07 RX ADMIN — SODIUM CHLORIDE, PRESERVATIVE FREE 10 ML: 5 INJECTION INTRAVENOUS at 08:13

## 2023-02-07 RX ADMIN — SUCRALFATE 1 G: 1 TABLET ORAL at 06:04

## 2023-02-07 RX ADMIN — ENOXAPARIN SODIUM 120 MG: 150 INJECTION SUBCUTANEOUS at 20:12

## 2023-02-07 RX ADMIN — WARFARIN SODIUM 2.5 MG: 2.5 TABLET ORAL at 17:26

## 2023-02-07 RX ADMIN — AMLODIPINE BESYLATE 5 MG: 5 TABLET ORAL at 08:13

## 2023-02-07 RX ADMIN — SUCRALFATE 1 G: 1 TABLET ORAL at 10:03

## 2023-02-07 RX ADMIN — PANTOPRAZOLE SODIUM 40 MG: 40 TABLET, DELAYED RELEASE ORAL at 15:34

## 2023-02-07 RX ADMIN — MORPHINE SULFATE 2 MG: 2 INJECTION, SOLUTION INTRAMUSCULAR; INTRAVENOUS at 15:34

## 2023-02-07 RX ADMIN — Medication 3 MG: at 20:13

## 2023-02-07 RX ADMIN — METOPROLOL SUCCINATE 25 MG: 25 TABLET, FILM COATED, EXTENDED RELEASE ORAL at 08:13

## 2023-02-07 RX ADMIN — MORPHINE SULFATE 2 MG: 2 INJECTION, SOLUTION INTRAMUSCULAR; INTRAVENOUS at 20:13

## 2023-02-07 RX ADMIN — ASPIRIN 81 MG: 81 TABLET, COATED ORAL at 08:13

## 2023-02-07 RX ADMIN — MORPHINE SULFATE 2 MG: 2 INJECTION, SOLUTION INTRAMUSCULAR; INTRAVENOUS at 11:35

## 2023-02-07 RX ADMIN — FUROSEMIDE 40 MG: 10 INJECTION, SOLUTION INTRAVENOUS at 15:45

## 2023-02-07 RX ADMIN — SODIUM CHLORIDE, PRESERVATIVE FREE 10 ML: 5 INJECTION INTRAVENOUS at 20:17

## 2023-02-07 RX ADMIN — ENOXAPARIN SODIUM 120 MG: 150 INJECTION SUBCUTANEOUS at 08:13

## 2023-02-07 RX ADMIN — SUCRALFATE 1 G: 1 TABLET ORAL at 15:45

## 2023-02-07 RX ADMIN — SUCRALFATE 1 G: 1 TABLET ORAL at 20:13

## 2023-02-07 RX ADMIN — DULOXETINE HYDROCHLORIDE 30 MG: 30 CAPSULE, DELAYED RELEASE ORAL at 08:13

## 2023-02-07 RX ADMIN — PERFLUTREN 1.5 ML: 6.52 INJECTION, SUSPENSION INTRAVENOUS at 09:20

## 2023-02-07 RX ADMIN — MORPHINE SULFATE 2 MG: 2 INJECTION, SOLUTION INTRAMUSCULAR; INTRAVENOUS at 02:58

## 2023-02-07 RX ADMIN — MORPHINE SULFATE 2 MG: 2 INJECTION, SOLUTION INTRAMUSCULAR; INTRAVENOUS at 07:23

## 2023-02-07 ASSESSMENT — PAIN DESCRIPTION - ORIENTATION
ORIENTATION: LEFT

## 2023-02-07 ASSESSMENT — PAIN DESCRIPTION - FREQUENCY
FREQUENCY: CONTINUOUS
FREQUENCY: CONTINUOUS

## 2023-02-07 ASSESSMENT — PAIN - FUNCTIONAL ASSESSMENT
PAIN_FUNCTIONAL_ASSESSMENT: ACTIVITIES ARE NOT PREVENTED
PAIN_FUNCTIONAL_ASSESSMENT: ACTIVITIES ARE NOT PREVENTED

## 2023-02-07 ASSESSMENT — PAIN DESCRIPTION - LOCATION
LOCATION: CHEST

## 2023-02-07 ASSESSMENT — PAIN SCALES - GENERAL
PAINLEVEL_OUTOF10: 4
PAINLEVEL_OUTOF10: 5
PAINLEVEL_OUTOF10: 7
PAINLEVEL_OUTOF10: 6

## 2023-02-07 ASSESSMENT — PAIN DESCRIPTION - DESCRIPTORS
DESCRIPTORS: SHARP
DESCRIPTORS: DISCOMFORT;SHARP
DESCRIPTORS: SHARP

## 2023-02-07 ASSESSMENT — PAIN DESCRIPTION - PAIN TYPE
TYPE: ACUTE PAIN
TYPE: ACUTE PAIN

## 2023-02-07 ASSESSMENT — PAIN DESCRIPTION - ONSET: ONSET: ON-GOING

## 2023-02-07 NOTE — PROGRESS NOTES
[facility] Progress Note    Admitting Date and Time: 2/6/2023  9:13 AM  Admit Dx: Chest pain [R07.9]    Subjective:  Patient is being followed for Chest pain [R07.9]     Patient still continues to have chest pain that is worse with exertion    ROS: denies fever, chills, cp, sob, n/v, HA unless stated above.       amLODIPine  5 mg Oral Daily    aspirin  81 mg Oral Daily    DULoxetine  30 mg Oral Daily    metoprolol succinate  25 mg Oral Daily    pantoprazole  40 mg Oral BID AC    sucralfate  1 g Oral 4x Daily AC & HS    warfarin  2.5 mg Oral Daily    sodium chloride flush  5-40 mL IntraVENous 2 times per day    enoxaparin  1 mg/kg SubCUTAneous BID     morphine, 2 mg, Q4H PRN  nicotine polacrilex, 2 mg, Q2H PRN  sodium chloride flush, 5-40 mL, PRN  sodium chloride, , PRN  ondansetron, 4 mg, Q8H PRN   Or  ondansetron, 4 mg, Q6H PRN  polyethylene glycol, 17 g, Daily PRN  acetaminophen, 650 mg, Q6H PRN   Or  acetaminophen, 650 mg, Q6H PRN  nitroGLYCERIN, 0.4 mg, Q5 Min PRN  melatonin, 3 mg, Nightly PRN  hydrOXYzine pamoate, 25 mg, TID PRN         Objective:    BP (!) 161/100   Pulse 81   Temp 98 °F (36.7 °C) (Oral)   Resp 20   Ht 5' 10\" (1.778 m)   Wt 261 lb 4.8 oz (118.5 kg)   SpO2 100%   BMI 37.49 kg/m²     General Appearance: alert and oriented to person, place and time and in no acute distress  Skin: warm and dry  Head: normocephalic and atraumatic  Eyes: pupils equal, round, and reactive to light, extraocular eye movements intact, conjunctivae normal  Neck: neck supple and non tender without mass   Pulmonary/Chest: clear to auscultation bilaterally- no wheezes, rales or rhonchi, normal air movement, no respiratory distress  Cardiovascular: normal rate, normal S1 and S2 and no carotid bruits, chest pain reproducible with palpation  Abdomen: soft, non-tender, non-distended, normal bowel sounds, no masses or organomegaly  Extremities: no cyanosis, no clubbing and no edema  Neurologic: no cranial nerve deficit and speech normal        Recent Labs     02/06/23  0947 02/07/23  1015    136   K 3.1* 3.9   CL 98 104   CO2 19* 22   BUN 7 12   CREATININE 1.4* 1.6*   GLUCOSE 145* 106*   CALCIUM 8.3* 8.6       Recent Labs     02/06/23  0947 02/07/23  1015   WBC 5.5 5.5   RBC 4.17 3.93   HGB 12.4* 11.7*   HCT 39.1 36.9*   MCV 93.8 93.9   MCH 29.7 29.8   MCHC 31.7* 31.7*   RDW 15.8* 15.8*    156   MPV 11.7 12.0         Assessment:    Principal Problem:    Chest pain  Active Problems:    S/P MVR (mitral valve replacement)    S/P AVR (aortic valve replacement)    Coronary artery disease involving native coronary artery of native heart without angina pectoris  Resolved Problems:    * No resolved hospital problems. *      Plan:    Acute atypical chest pain - Chest pain reproducible upon palpation. Continue nitro PRN, asa, and toprol. A1c, lipid panel, and TSH are unremarkable. Echo pending. Cardiology consult appreciated  H/O prosthetic aortic and mitral valve replacement - Goal INR is 2.5 to 3.5. Subtherapeutic INR - Continue lovenox and coumadin  H/O CVA  H/O Testicular cancer  H/O alcoholic liver disease  DVT prophylaxis - Coumadin    NOTE: This report was transcribed using voice recognition software. Every effort was made to ensure accuracy; however, inadvertent computerized transcription errors may be present.   Electronically signed by Denise Dominguez DO on 2/7/2023 at 3:06 PM

## 2023-02-07 NOTE — PROGRESS NOTES
INPATIENT CARDIOLOGY FOLLOW-UP    Name: Marie Basurto    Age: 65 y.o. Date of Admission: 2/6/2023  9:13 AM    Date of Service: 2/7/2023    Primary Cardiologist: Dr Shanice Schuster    Chief Complaint: Follow-up for chest pain    Interim History:  Continued episodes of chest pain overnight, worse with deep breathing, not relieved with nitroglycerin, improved with ketorolac and morphine. Also feels short of breath at rest and with exertion.     Review of Systems:   Negative except as described above    Problem List:  Patient Active Problem List   Diagnosis    Chest pain    Closed fracture of nasal bones    Injury of face    Retroperitoneal hematoma    Vitamin D deficiency    Acute chest pain    Acute cholecystitis    Gastrointestinal hemorrhage    Polyp of colon    Right upper quadrant abdominal pain    Diarrhea    Current moderate episode of major depressive disorder without prior episode (HCC)    Hypertension    S/P MVR (mitral valve replacement)    S/P AVR (aortic valve replacement)    Coronary artery disease involving native coronary artery of native heart without angina pectoris       Current Medications:    Current Facility-Administered Medications:     morphine (PF) injection 2 mg, 2 mg, IntraVENous, Q4H PRN, Ankita Ingram, APRN - CNP, 2 mg at 02/07/23 0723    amLODIPine (NORVASC) tablet 5 mg, 5 mg, Oral, Daily, Annie J Montrose, DO, 5 mg at 02/07/23 0813    aspirin EC tablet 81 mg, 81 mg, Oral, Daily, Annie J Montrose, DO, 81 mg at 02/07/23 0813    DULoxetine (CYMBALTA) extended release capsule 30 mg, 30 mg, Oral, Daily, Annie J Chuck, DO, 30 mg at 02/07/23 0813    metoprolol succinate (TOPROL XL) extended release tablet 25 mg, 25 mg, Oral, Daily, Annie J Chuck, DO, 25 mg at 02/07/23 1773    nicotine polacrilex (COMMIT) lozenge 2 mg, 2 mg, Oral, Q2H PRN, Annie J Chuck, DO    pantoprazole (PROTONIX) tablet 40 mg, 40 mg, Oral, BID AC, Annie J Montrose, DO, 40 mg at 02/07/23 0604    sucralfate (CARAFATE) tablet 1 g, 1 g, Oral, 4x Daily AC & HS, Annie Woods, DO, 1 g at 02/07/23 1003    warfarin (COUMADIN) tablet 2.5 mg, 2.5 mg, Oral, Daily, Annie Woods DO, 2.5 mg at 02/06/23 1803    sodium chloride flush 0.9 % injection 5-40 mL, 5-40 mL, IntraVENous, 2 times per day, Annie Woods DO, 10 mL at 02/07/23 0813    sodium chloride flush 0.9 % injection 5-40 mL, 5-40 mL, IntraVENous, PRN, Annie Woods DO    0.9 % sodium chloride infusion, , IntraVENous, PRN, Annie Woods DO    ondansetron (ZOFRAN-ODT) disintegrating tablet 4 mg, 4 mg, Oral, Q8H PRN **OR** ondansetron (ZOFRAN) injection 4 mg, 4 mg, IntraVENous, Q6H PRN, Annie Woods, DO    polyethylene glycol (GLYCOLAX) packet 17 g, 17 g, Oral, Daily PRN, Annie Hardenon, DO    acetaminophen (TYLENOL) tablet 650 mg, 650 mg, Oral, Q6H PRN, 650 mg at 02/06/23 2234 **OR** acetaminophen (TYLENOL) suppository 650 mg, 650 mg, Rectal, Q6H PRN, Annie Hardenon DO    nitroGLYCERIN (NITROSTAT) SL tablet 0.4 mg, 0.4 mg, SubLINGual, Q5 Min PRN, Annie Woods DO, 0.4 mg at 02/06/23 2234    enoxaparin (LOVENOX) injection 120 mg, 1 mg/kg, SubCUTAneous, BID, Korina Pantelis, APRN - CNP, 120 mg at 02/07/23 0813    melatonin tablet 3 mg, 3 mg, Oral, Nightly PRN, Lynette Navarrete MD, 3 mg at 02/06/23 2109    hydrOXYzine pamoate (VISTARIL) capsule 25 mg, 25 mg, Oral, TID PRN, Lynette Navarrete MD    Physical Exam:  BP (!) 141/96   Pulse 76   Temp 97.1 °F (36.2 °C) (Temporal)   Resp 20   Ht 5' 10\" (1.778 m)   Wt 261 lb 4.8 oz (118.5 kg)   SpO2 100%   BMI 37.49 kg/m²   Wt Readings from Last 3 Encounters:   02/06/23 261 lb 4.8 oz (118.5 kg)   09/15/22 250 lb (113.4 kg)   09/07/22 254 lb (115.2 kg)     Appearance: Obese, awake, alert, no acute respiratory distress  Skin: Intact, no rash  Head: Normocephalic, atraumatic  Eyes: EOMI, no conjunctival erythema  ENMT: No pharyngeal erythema, MMM, no rhinorrhea  Neck: Supple, no elevated JVP, no carotid bruits  Lungs: Clear to auscultation bilaterally. No wheezes, rales, or rhonchi. Cardiac: PMI nondisplaced, Regular rhythm with a normal rate, mechanical crisp sounding S1 & S2 normal, no murmurs. Right chest pacemaker.   Chest wall tender to palpation reproducing chest pain symptoms  Abdomen: Soft, nontender, +bowel sounds  Extremities: Moves all extremities x 4, no lower extremity edema  Neurologic: No focal motor deficits apparent, normal mood and affect  Peripheral Pulses: Intact posterior tibial pulses bilaterally    Intake/Output:    Intake/Output Summary (Last 24 hours) at 2/7/2023 1038  Last data filed at 2/7/2023 0815  Gross per 24 hour   Intake 570 ml   Output --   Net 570 ml     I/O this shift:  In: 150 [P.O.:150]  Out: -     Laboratory Tests:  Recent Labs     02/06/23  0947      K 3.1*   CL 98   CO2 19*   BUN 7   CREATININE 1.4*   GLUCOSE 145*   CALCIUM 8.3*     Lab Results   Component Value Date/Time    MG 1.7 08/25/2022 04:44 AM     Recent Labs     02/06/23  0947   ALKPHOS 131*   ALT 46*   AST 48*   PROT 6.8   BILITOT 0.7   LABALBU 3.7     Recent Labs     02/06/23  0947   WBC 5.5   RBC 4.17   HGB 12.4*   HCT 39.1   MCV 93.8   MCH 29.7   MCHC 31.7*   RDW 15.8*      MPV 11.7     No results found for: CKTOTAL, CKMB, CKMBINDEX, TROPONINI  Lab Results   Component Value Date    INR 1.8 02/06/2023    INR >10.0 (HH) 09/15/2022    INR 8.0 09/15/2022    PROTIME 20.2 (H) 02/06/2023    PROTIME >120.0 (H) 09/15/2022    PROTIME 96.0 09/15/2022     Lab Results   Component Value Date    TSH 2.590 02/06/2023     Lab Results   Component Value Date    LABA1C 4.9 02/06/2023     No results found for: EAG  Lab Results   Component Value Date    CHOL 134 02/06/2023    CHOL 86 03/24/2022     Lab Results   Component Value Date    TRIG 146 02/06/2023    TRIG 149 03/24/2022     Lab Results   Component Value Date    HDL 33 02/06/2023    HDL 28 03/24/2022     Lab Results   Component Value Date    LDLCALC 72 02/06/2023    [facility] 28 2022     Lab Results   Component Value Date    LABVLDL 29 2023    LABVLDL 30 2022     No results found for: CHOLHDLRATIO  Recent Labs     23  0947   PROBNP 246*       Cardiac Tests:    EK2023: Sinus tachycardia 104 bpm.  RBBB/LAFB QRS duration 160 ms    Telemetry: Sinus rhythm 80s    Chest X-ray:   23  Personally reviewed. Right-sided dual-chamber pacemaker. Sternotomy wires. Mechanical aortic and mitral valve    Impression   Postsurgical changes. Enlarged cardiomediastinal silhouette. Low lung   volumes and elevated right hemidiaphragm. Echocardiogram:   2020 TTE [facility]: EF 55-60%. Abnormal septal motion consistent with post-operative status. #29 [place] bileaflet mechanical valve. MV gradient 5.4 mmHg and trival prosthetic MR. Mildly elevated RVSP (35.3 mmHg). Mild to moderate TR. #23 [place]'s bileaflet mechanical aortic valve with gradients 21.9/12.3 mmHg and DI of 0.53 and trivial AI. When compared to previous TTE AV gradients are similar. No MV gradients were obtained on previous TTE. Stress test:      Cardiac catheterization:  2020 [facility] JOSE LAD mid vessel 95% stenosis. LCx posterolateral extension 70% stenosis     Device interrogation 2023  Medtronic  12.1 years battery life remaining  DDDR LRL 60 bpm  0.4% ventricular pacing  1.0% atrial pacing  Less than 0.1% AT/AF burden  12 NSVT    ----------------------------------------------------------------------------------------------------------------------------------------------------------------  IMPRESSION:  Atypical chest pain. Noncardiac likely musculoskeletal  Mechanical [place] AVR and MVR   for severe MR/AR, noncompliant with warfarin with subtherapeutic INR  CAD, single-vessel CABG LIMA-LAD at time of valve surgery  Postoperative complete heart block s/p dual-chamber PPM Medtronic  LBBB/LAFB  Chronic HFpEF.   Does not appear hypervolemic.  proBNP 246  Chronically elevated hs-cTnT  Dyspnea  CKD creatinine 1.4-1.6  Hypokalemia, improved  Mildly elevated LFTs  Hypertension poorly controlled  Hyperlipidemia  History of CVA  History of testicular cancer  Tobacco abuse  Alcohol abuse  Obesity    RECOMMENDATIONS:    Echo done, will review  No current plans for ischemic evaluation  Continue warfarin, goal INR 3.0 (2.5-3.5)  Continue aspirin, statin, metoprolol succinate, amlodipine  Maintain electrolyte replacement  Reiterated importance of compliance with anticoagulation  Outpatient sleep study if he has never had one  Aggressive risk factor modification clinic smoking cessation weight loss recommended  Further care per primary service    Morteza Lundberg MD, [address] [place] [address] Cardiology    NOTE: This report was transcribed using voice recognition software. Every effort was made to ensure accuracy; however, inadvertent computerized transcription errors may be present.

## 2023-02-07 NOTE — PLAN OF CARE
Problem: ABCDS Injury Assessment  Goal: Absence of physical injury  Outcome: Progressing     Problem: Discharge Planning  Goal: Discharge to home or other facility with appropriate resources  Outcome: Progressing     Problem: Pain  Goal: Verbalizes/displays adequate comfort level or baseline comfort level  Outcome: Progressing     Problem: Safety - Adult  Goal: Free from fall injury  Outcome: Progressing     Problem: Chronic Conditions and Co-morbidities  Goal: Patient's chronic conditions and co-morbidity symptoms are monitored and maintained or improved  Outcome: Progressing

## 2023-02-07 NOTE — PROGRESS NOTES
Dr Lee Aguero,   Patient has mechanical heart valves. Coumadin is ordered with a goal INR of 2-3. Goal should be 2.5 to 3.5. Please see the note from cardiology. Thank Lieutenant Allison Pharm. D 2/7/2023 8:21 AM

## 2023-02-08 ENCOUNTER — ANESTHESIA EVENT (OUTPATIENT)
Dept: NON INVASIVE DIAGNOSTICS | Age: 58
End: 2023-02-08
Payer: COMMERCIAL

## 2023-02-08 ENCOUNTER — APPOINTMENT (OUTPATIENT)
Dept: NON INVASIVE DIAGNOSTICS | Age: 58
DRG: 203 | End: 2023-02-08
Payer: COMMERCIAL

## 2023-02-08 ENCOUNTER — ANESTHESIA (OUTPATIENT)
Dept: NON INVASIVE DIAGNOSTICS | Age: 58
End: 2023-02-08
Payer: COMMERCIAL

## 2023-02-08 PROBLEM — Z95.1 HX OF CABG: Status: ACTIVE | Noted: 2023-02-08

## 2023-02-08 PROBLEM — E66.01 CLASS 2 SEVERE OBESITY DUE TO EXCESS CALORIES WITH SERIOUS COMORBIDITY AND BODY MASS INDEX (BMI) OF 36.0 TO 36.9 IN ADULT (HCC): Status: ACTIVE | Noted: 2023-02-08

## 2023-02-08 PROBLEM — R06.00 DYSPNEA: Status: ACTIVE | Noted: 2023-02-08

## 2023-02-08 PROBLEM — Z79.01 WARFARIN ANTICOAGULATION: Status: ACTIVE | Noted: 2023-02-08

## 2023-02-08 PROBLEM — R79.1 SUBTHERAPEUTIC INTERNATIONAL NORMALIZED RATIO (INR): Status: ACTIVE | Noted: 2023-02-08

## 2023-02-08 PROBLEM — T82.03XA PARAVALVULAR LEAK (PROSTHETIC VALVE): Status: ACTIVE | Noted: 2023-02-08

## 2023-02-08 PROBLEM — E66.812 CLASS 2 SEVERE OBESITY DUE TO EXCESS CALORIES WITH SERIOUS COMORBIDITY AND BODY MASS INDEX (BMI) OF 36.0 TO 36.9 IN ADULT: Status: ACTIVE | Noted: 2023-02-08

## 2023-02-08 LAB
ANION GAP SERPL CALCULATED.3IONS-SCNC: 10 MMOL/L (ref 7–16)
APTT: 41.8 SEC (ref 24.5–35.1)
APTT: 56.2 SEC (ref 24.5–35.1)
BUN BLDV-MCNC: 14 MG/DL (ref 6–20)
CALCIUM SERPL-MCNC: 8.5 MG/DL (ref 8.6–10.2)
CHLORIDE BLD-SCNC: 102 MMOL/L (ref 98–107)
CO2: 24 MMOL/L (ref 22–29)
CREAT SERPL-MCNC: 1.6 MG/DL (ref 0.7–1.2)
GFR SERPL CREATININE-BSD FRML MDRD: 50 ML/MIN/1.73
GLUCOSE BLD-MCNC: 103 MG/DL (ref 74–99)
HCT VFR BLD CALC: 36.6 % (ref 37–54)
HCT VFR BLD CALC: 39 % (ref 37–54)
HEMOGLOBIN: 11.5 G/DL (ref 12.5–16.5)
HEMOGLOBIN: 12.2 G/DL (ref 12.5–16.5)
INR BLD: 1.6
MCH RBC QN AUTO: 28.8 PG (ref 26–35)
MCH RBC QN AUTO: 29.8 PG (ref 26–35)
MCHC RBC AUTO-ENTMCNC: 31.3 % (ref 32–34.5)
MCHC RBC AUTO-ENTMCNC: 31.4 % (ref 32–34.5)
MCV RBC AUTO: 92.2 FL (ref 80–99.9)
MCV RBC AUTO: 94.8 FL (ref 80–99.9)
PDW BLD-RTO: 15.7 FL (ref 11.5–15)
PDW BLD-RTO: 15.9 FL (ref 11.5–15)
PLATELET # BLD: 135 E9/L (ref 130–450)
PLATELET # BLD: 137 E9/L (ref 130–450)
PMV BLD AUTO: 11.8 FL (ref 7–12)
PMV BLD AUTO: 12.4 FL (ref 7–12)
POTASSIUM SERPL-SCNC: 3.7 MMOL/L (ref 3.5–5)
PROTHROMBIN TIME: 17.7 SEC (ref 9.3–12.4)
RBC # BLD: 3.86 E12/L (ref 3.8–5.8)
RBC # BLD: 4.23 E12/L (ref 3.8–5.8)
SODIUM BLD-SCNC: 136 MMOL/L (ref 132–146)
WBC # BLD: 7.7 E9/L (ref 4.5–11.5)
WBC # BLD: 8 E9/L (ref 4.5–11.5)

## 2023-02-08 PROCEDURE — 85610 PROTHROMBIN TIME: CPT

## 2023-02-08 PROCEDURE — 96376 TX/PRO/DX INJ SAME DRUG ADON: CPT

## 2023-02-08 PROCEDURE — 6360000002 HC RX W HCPCS: Performed by: NURSE ANESTHETIST, CERTIFIED REGISTERED

## 2023-02-08 PROCEDURE — 36415 COLL VENOUS BLD VENIPUNCTURE: CPT

## 2023-02-08 PROCEDURE — 96366 THER/PROPH/DIAG IV INF ADDON: CPT

## 2023-02-08 PROCEDURE — 7100000001 HC PACU RECOVERY - ADDTL 15 MIN

## 2023-02-08 PROCEDURE — 7100000000 HC PACU RECOVERY - FIRST 15 MIN

## 2023-02-08 PROCEDURE — 96365 THER/PROPH/DIAG IV INF INIT: CPT

## 2023-02-08 PROCEDURE — G0378 HOSPITAL OBSERVATION PER HR: HCPCS

## 2023-02-08 PROCEDURE — 3700000000 HC ANESTHESIA ATTENDED CARE

## 2023-02-08 PROCEDURE — 6360000002 HC RX W HCPCS: Performed by: NURSE PRACTITIONER

## 2023-02-08 PROCEDURE — 99233 SBSQ HOSP IP/OBS HIGH 50: CPT | Performed by: INTERNAL MEDICINE

## 2023-02-08 PROCEDURE — 93319 3D ECHO IMG CGEN CAR ANOMAL: CPT | Performed by: INTERNAL MEDICINE

## 2023-02-08 PROCEDURE — 6370000000 HC RX 637 (ALT 250 FOR IP): Performed by: INTERNAL MEDICINE

## 2023-02-08 PROCEDURE — 2580000003 HC RX 258: Performed by: INTERNAL MEDICINE

## 2023-02-08 PROCEDURE — 2580000003 HC RX 258: Performed by: NURSE ANESTHETIST, CERTIFIED REGISTERED

## 2023-02-08 PROCEDURE — 93320 DOPPLER ECHO COMPLETE: CPT

## 2023-02-08 PROCEDURE — 93325 DOPPLER ECHO COLOR FLOW MAPG: CPT

## 2023-02-08 PROCEDURE — 93320 DOPPLER ECHO COMPLETE: CPT | Performed by: INTERNAL MEDICINE

## 2023-02-08 PROCEDURE — 6360000002 HC RX W HCPCS: Performed by: INTERNAL MEDICINE

## 2023-02-08 PROCEDURE — 93312 ECHO TRANSESOPHAGEAL: CPT

## 2023-02-08 PROCEDURE — 96372 THER/PROPH/DIAG INJ SC/IM: CPT

## 2023-02-08 PROCEDURE — 6360000002 HC RX W HCPCS

## 2023-02-08 PROCEDURE — 85730 THROMBOPLASTIN TIME PARTIAL: CPT

## 2023-02-08 PROCEDURE — 3700000001 HC ADD 15 MINUTES (ANESTHESIA)

## 2023-02-08 PROCEDURE — 93312 ECHO TRANSESOPHAGEAL: CPT | Performed by: INTERNAL MEDICINE

## 2023-02-08 PROCEDURE — 80048 BASIC METABOLIC PNL TOTAL CA: CPT

## 2023-02-08 PROCEDURE — 85027 COMPLETE CBC AUTOMATED: CPT

## 2023-02-08 PROCEDURE — B24BZZ4 ULTRASONOGRAPHY OF HEART WITH AORTA, TRANSESOPHAGEAL: ICD-10-PCS | Performed by: INTERNAL MEDICINE

## 2023-02-08 PROCEDURE — 6370000000 HC RX 637 (ALT 250 FOR IP): Performed by: STUDENT IN AN ORGANIZED HEALTH CARE EDUCATION/TRAINING PROGRAM

## 2023-02-08 RX ORDER — SODIUM CHLORIDE 9 MG/ML
INJECTION, SOLUTION INTRAVENOUS CONTINUOUS PRN
Status: DISCONTINUED | OUTPATIENT
Start: 2023-02-08 | End: 2023-02-08 | Stop reason: SDUPTHER

## 2023-02-08 RX ORDER — HEPARIN SODIUM 10000 [USP'U]/100ML
5-30 INJECTION, SOLUTION INTRAVENOUS CONTINUOUS
Status: DISCONTINUED | OUTPATIENT
Start: 2023-02-08 | End: 2023-02-11

## 2023-02-08 RX ORDER — PROPOFOL 10 MG/ML
INJECTION, EMULSION INTRAVENOUS PRN
Status: DISCONTINUED | OUTPATIENT
Start: 2023-02-08 | End: 2023-02-08 | Stop reason: SDUPTHER

## 2023-02-08 RX ORDER — HEPARIN SODIUM 1000 [USP'U]/ML
2000 INJECTION, SOLUTION INTRAVENOUS; SUBCUTANEOUS PRN
Status: DISCONTINUED | OUTPATIENT
Start: 2023-02-08 | End: 2023-02-11 | Stop reason: ALTCHOICE

## 2023-02-08 RX ORDER — HEPARIN SODIUM 1000 [USP'U]/ML
4000 INJECTION, SOLUTION INTRAVENOUS; SUBCUTANEOUS ONCE
Status: COMPLETED | OUTPATIENT
Start: 2023-02-08 | End: 2023-02-08

## 2023-02-08 RX ORDER — WARFARIN SODIUM 5 MG/1
5 TABLET ORAL DAILY
Status: DISCONTINUED | OUTPATIENT
Start: 2023-02-08 | End: 2023-02-09

## 2023-02-08 RX ORDER — HEPARIN SODIUM 1000 [USP'U]/ML
4000 INJECTION, SOLUTION INTRAVENOUS; SUBCUTANEOUS PRN
Status: DISCONTINUED | OUTPATIENT
Start: 2023-02-08 | End: 2023-02-11 | Stop reason: ALTCHOICE

## 2023-02-08 RX ADMIN — SODIUM CHLORIDE, PRESERVATIVE FREE 10 ML: 5 INJECTION INTRAVENOUS at 08:15

## 2023-02-08 RX ADMIN — SUCRALFATE 1 G: 1 TABLET ORAL at 05:45

## 2023-02-08 RX ADMIN — METOPROLOL SUCCINATE 25 MG: 25 TABLET, FILM COATED, EXTENDED RELEASE ORAL at 08:15

## 2023-02-08 RX ADMIN — MORPHINE SULFATE 2 MG: 2 INJECTION, SOLUTION INTRAMUSCULAR; INTRAVENOUS at 21:19

## 2023-02-08 RX ADMIN — Medication 2 MG: at 15:08

## 2023-02-08 RX ADMIN — HEPARIN SODIUM 4000 UNITS: 1000 INJECTION INTRAVENOUS; SUBCUTANEOUS at 14:46

## 2023-02-08 RX ADMIN — SUCRALFATE 1 G: 1 TABLET ORAL at 15:41

## 2023-02-08 RX ADMIN — SODIUM CHLORIDE: 9 INJECTION, SOLUTION INTRAVENOUS at 10:37

## 2023-02-08 RX ADMIN — WARFARIN SODIUM 5 MG: 5 TABLET ORAL at 17:13

## 2023-02-08 RX ADMIN — SODIUM CHLORIDE, PRESERVATIVE FREE 10 ML: 5 INJECTION INTRAVENOUS at 21:29

## 2023-02-08 RX ADMIN — Medication 2 MG: at 13:09

## 2023-02-08 RX ADMIN — ASPIRIN 81 MG: 81 TABLET, COATED ORAL at 08:15

## 2023-02-08 RX ADMIN — MORPHINE SULFATE 2 MG: 2 INJECTION, SOLUTION INTRAMUSCULAR; INTRAVENOUS at 13:05

## 2023-02-08 RX ADMIN — PANTOPRAZOLE SODIUM 40 MG: 40 TABLET, DELAYED RELEASE ORAL at 15:41

## 2023-02-08 RX ADMIN — ENOXAPARIN SODIUM 120 MG: 150 INJECTION SUBCUTANEOUS at 08:16

## 2023-02-08 RX ADMIN — MORPHINE SULFATE 2 MG: 2 INJECTION, SOLUTION INTRAMUSCULAR; INTRAVENOUS at 17:13

## 2023-02-08 RX ADMIN — HEPARIN SODIUM 8 UNITS/KG/HR: 10000 INJECTION, SOLUTION INTRAVENOUS at 14:46

## 2023-02-08 RX ADMIN — SUCRALFATE 1 G: 1 TABLET ORAL at 21:19

## 2023-02-08 RX ADMIN — AMLODIPINE BESYLATE 5 MG: 5 TABLET ORAL at 08:15

## 2023-02-08 RX ADMIN — Medication 3 MG: at 21:19

## 2023-02-08 RX ADMIN — MORPHINE SULFATE 2 MG: 2 INJECTION, SOLUTION INTRAMUSCULAR; INTRAVENOUS at 04:47

## 2023-02-08 RX ADMIN — Medication 2 MG: at 05:45

## 2023-02-08 RX ADMIN — MORPHINE SULFATE 2 MG: 2 INJECTION, SOLUTION INTRAMUSCULAR; INTRAVENOUS at 00:22

## 2023-02-08 RX ADMIN — PANTOPRAZOLE SODIUM 40 MG: 40 TABLET, DELAYED RELEASE ORAL at 05:45

## 2023-02-08 RX ADMIN — MORPHINE SULFATE 2 MG: 2 INJECTION, SOLUTION INTRAMUSCULAR; INTRAVENOUS at 08:57

## 2023-02-08 RX ADMIN — PROPOFOL 300 MG: 10 INJECTION, EMULSION INTRAVENOUS at 10:47

## 2023-02-08 RX ADMIN — DULOXETINE HYDROCHLORIDE 30 MG: 30 CAPSULE, DELAYED RELEASE ORAL at 08:15

## 2023-02-08 ASSESSMENT — PAIN SCALES - GENERAL
PAINLEVEL_OUTOF10: 6
PAINLEVEL_OUTOF10: 7
PAINLEVEL_OUTOF10: 8
PAINLEVEL_OUTOF10: 6
PAINLEVEL_OUTOF10: 7
PAINLEVEL_OUTOF10: 6

## 2023-02-08 ASSESSMENT — PAIN DESCRIPTION - ONSET: ONSET: ON-GOING

## 2023-02-08 ASSESSMENT — PAIN DESCRIPTION - LOCATION
LOCATION: CHEST

## 2023-02-08 ASSESSMENT — PAIN - FUNCTIONAL ASSESSMENT: PAIN_FUNCTIONAL_ASSESSMENT: ACTIVITIES ARE NOT PREVENTED

## 2023-02-08 ASSESSMENT — PAIN DESCRIPTION - DESCRIPTORS
DESCRIPTORS: DISCOMFORT;SHARP
DESCRIPTORS: SHOOTING;SHARP;STABBING
DESCRIPTORS: SHARP;SHOOTING;STABBING

## 2023-02-08 ASSESSMENT — PAIN DESCRIPTION - PAIN TYPE: TYPE: ACUTE PAIN

## 2023-02-08 ASSESSMENT — PAIN DESCRIPTION - ORIENTATION
ORIENTATION: LEFT
ORIENTATION: LEFT;RIGHT
ORIENTATION: LEFT

## 2023-02-08 ASSESSMENT — PAIN DESCRIPTION - FREQUENCY: FREQUENCY: CONTINUOUS

## 2023-02-08 NOTE — PROGRESS NOTES
[facility] Progress Note    Admitting Date and Time: 2/6/2023  9:13 AM  Admit Dx: Chest pain [R07.9]    Subjective:  Patient is being followed for Chest pain [R07.9]     Patient states that the morphine does help with the chest pain however refuses NSAIDS.      ROS: denies fever, chills, cp, sob, n/v, HA unless stated above.      warfarin  5 mg Oral Daily    amLODIPine  5 mg Oral Daily    aspirin  81 mg Oral Daily    DULoxetine  30 mg Oral Daily    metoprolol succinate  25 mg Oral Daily    pantoprazole  40 mg Oral BID AC    sucralfate  1 g Oral 4x Daily AC & HS    sodium chloride flush  5-40 mL IntraVENous 2 times per day    enoxaparin  1 mg/kg SubCUTAneous BID     morphine, 2 mg, Q4H PRN  nicotine polacrilex, 2 mg, Q2H PRN  sodium chloride flush, 5-40 mL, PRN  sodium chloride, , PRN  ondansetron, 4 mg, Q8H PRN   Or  ondansetron, 4 mg, Q6H PRN  polyethylene glycol, 17 g, Daily PRN  acetaminophen, 650 mg, Q6H PRN   Or  acetaminophen, 650 mg, Q6H PRN  nitroGLYCERIN, 0.4 mg, Q5 Min PRN  melatonin, 3 mg, Nightly PRN  hydrOXYzine pamoate, 25 mg, TID PRN         Objective:    /79   Pulse 78   Temp 98.6 °F (37 °C) (Oral)   Resp 18   Ht 5' 10\" (1.778 m)   Wt 255 lb 14.4 oz (116.1 kg)   SpO2 96%   BMI 36.72 kg/m²     General Appearance: alert and oriented to person, place and time and in no acute distress  Skin: warm and dry  Head: normocephalic and atraumatic  Eyes: pupils equal, round, and reactive to light, extraocular eye movements intact, conjunctivae normal  Neck: neck supple and non tender without mass   Pulmonary/Chest: clear to auscultation bilaterally- no wheezes, rales or rhonchi, normal air movement, no respiratory distress  Cardiovascular: normal rate, normal S1 and S2 and no carotid bruits  Abdomen: soft, non-tender, non-distended, normal bowel sounds, no masses or organomegaly  Extremities: no cyanosis, no clubbing and no edema  Neurologic: no cranial nerve deficit and speech normal        Recent Labs     02/06/23  0947 02/07/23  1015 02/08/23  0238    136 136   K 3.1* 3.9 3.7   CL 98 104 102   CO2 19* 22 24   BUN 7 12 14   CREATININE 1.4* 1.6* 1.6*   GLUCOSE 145* 106* 103*   CALCIUM 8.3* 8.6 8.5*       Recent Labs     02/06/23  0947 02/07/23  1015 02/08/23  0236   WBC 5.5 5.5 8.0   RBC 4.17 3.93 4.23   HGB 12.4* 11.7* 12.2*   HCT 39.1 36.9* 39.0   MCV 93.8 93.9 92.2   MCH 29.7 29.8 28.8   MCHC 31.7* 31.7* 31.3*   RDW 15.8* 15.8* 15.7*    156 135   MPV 11.7 12.0 11.8         Assessment:    Principal Problem:    Chest pain  Active Problems:    S/P MVR (mitral valve replacement)    S/P AVR (aortic valve replacement)    Coronary artery disease involving native coronary artery of native heart without angina pectoris    Paravalvular leak (prosthetic valve)    Hx of CABG    Warfarin anticoagulation    Subtherapeutic international normalized ratio (INR)    Class 2 severe obesity due to excess calories with serious comorbidity and body mass index (BMI) of 36.0 to 36.9 in [facility])    Dyspnea  Resolved Problems:    * No resolved hospital problems. *      Plan:    Acute atypical chest pain - Chest pain reproducible upon palpation. Continue nitro PRN, asa, and toprol. A1c, lipid panel, and TSH are unremarkable. Echo pending. Cardiology consult appreciated  H/O prosthetic aortic and mitral valve replacement - Goal INR is 2.5 to 3.5. BEATRIZ indicates that there is mild to moderate paravalvular MR. As per cardiology, he needs outpatient evaluation of the leak   CKD III - Baseline appears to be between 1.4 to 1.6  Subtherapeutic INR - Continue heparin and coumadin  H/O CVA  H/O Testicular cancer  H/O alcoholic liver disease  DVT prophylaxis - Coumadin, Heparin     NOTE: This report was transcribed using voice recognition software. Every effort was made to ensure accuracy; however, inadvertent computerized transcription errors may be present.     Electronically signed by Uyen Bryson DO on 2/8/2023 at 1:43 PM

## 2023-02-08 NOTE — PLAN OF CARE
Problem: ABCDS Injury Assessment  Goal: Absence of physical injury  2/8/2023 0457 by Audrey Mcclelland RN  Outcome: Progressing  2/7/2023 1620 by William Mauricio RN  Outcome: Progressing     Problem: Discharge Planning  Goal: Discharge to home or other facility with appropriate resources  2/8/2023 0457 by Audrey Mcclelland RN  Outcome: Progressing  2/7/2023 1620 by William Mauricio RN  Outcome: Progressing     Problem: Pain  Goal: Verbalizes/displays adequate comfort level or baseline comfort level  2/8/2023 0457 by Audrey Mcclelland RN  Outcome: Progressing  2/7/2023 1620 by William Muaricio RN  Outcome: Progressing     Problem: Safety - Adult  Goal: Free from fall injury  2/8/2023 0457 by Audrey Mcclelland RN  Outcome: Progressing  2/7/2023 1620 by William Mauricio RN  Outcome: Progressing     Problem: Chronic Conditions and Co-morbidities  Goal: Patient's chronic conditions and co-morbidity symptoms are monitored and maintained or improved  2/8/2023 0457 by Audrey Mcclelland RN  Outcome: Progressing  2/7/2023 1620 by William Mauricio RN  Outcome: Progressing

## 2023-02-08 NOTE — ANESTHESIA PRE PROCEDURE
Department of Anesthesiology  Preprocedure Note       Name:  Kylah Nichols   Age:  65 y.o.  :  1965                                          MRN:  [id number]         Date:  2023      Surgeon: * No surgeons listed *    Procedure: * No procedures listed *    Medications prior to admission:   Prior to Admission medications    Medication Sig Start Date End Date Taking?  Authorizing Provider   sertraline (ZOLOFT) 50 MG tablet Take 50 mg by mouth every morning   Yes Historical Provider, MD   warfarin (COUMADIN) 2.5 MG tablet Take 2.5 mg by mouth every morning   Yes Historical Provider, MD   rosuvastatin (CRESTOR) 10 MG tablet Take 10 mg by mouth every morning   Yes Historical Provider, MD   tamsulosin (FLOMAX) 0.4 MG capsule Take 0.4 mg by mouth every morning   Yes Historical Provider, MD   nicotine polacrilex (COMMIT) 2 MG lozenge Take 1 lozenge by mouth every 2 hours as needed for Smoking cessation 22   Bryce Gilliland MD       Current medications:    Current Facility-Administered Medications   Medication Dose Route Frequency Provider Last Rate Last Admin    morphine (PF) injection 2 mg  2 mg IntraVENous Q4H PRN Lazara De Jesus Addicott, APRN - CNP   2 mg at 23 0857    amLODIPine (NORVASC) tablet 5 mg  5 mg Oral Daily Annie HALL Chuck, DO   5 mg at 23 0815    aspirin EC tablet 81 mg  81 mg Oral Daily Annie J Chuck, DO   81 mg at 23 0815    DULoxetine (CYMBALTA) extended release capsule 30 mg  30 mg Oral Daily Annie HALL [place], DO   30 mg at 23 0815    metoprolol succinate (TOPROL XL) extended release tablet 25 mg  25 mg Oral Daily Annie J Neoga, DO   25 mg at 23 0815    nicotine polacrilex (COMMIT) lozenge 2 mg  2 mg Oral Q2H PRN Malena Rincon DO   2 mg at 23 0545    pantoprazole (PROTONIX) tablet 40 mg  40 mg Oral BID AC Annie HALL Chuck, DO   40 mg at 23 0545    sucralfate (CARAFATE) tablet 1 g  1 g Oral 4x Daily AC & HS Annie HALL [place], DO   1 g at 23 0545    warfarin (COUMADIN) tablet 2.5 mg  2.5 mg Oral Daily Annie J Chuck, DO   2.5 mg at 02/07/23 1726    sodium chloride flush 0.9 % injection 5-40 mL  5-40 mL IntraVENous 2 times per day Annie J [place], DO   10 mL at 02/08/23 0815    sodium chloride flush 0.9 % injection 5-40 mL  5-40 mL IntraVENous PRN Annie HALL Chuck, DO        0.9 % sodium chloride infusion   IntraVENous PRN Annie HALL [place], DO        ondansetron (ZOFRAN-ODT) disintegrating tablet 4 mg  4 mg Oral Q8H PRN Annie HALL [place], DO        Or    ondansetron (ZOFRAN) injection 4 mg  4 mg IntraVENous Q6H PRN Annie HALL Chuck, DO        polyethylene glycol (GLYCOLAX) packet 17 g  17 g Oral Daily PRN Annie HALL Chuck, DO        acetaminophen (TYLENOL) tablet 650 mg  650 mg Oral Q6H PRN Hodan Loud, DO   650 mg at 02/06/23 2234    Or    acetaminophen (TYLENOL) suppository 650 mg  650 mg Rectal Q6H PRN Hodan Loud, DO        nitroGLYCERIN (NITROSTAT) SL tablet 0.4 mg  0.4 mg SubLINGual Q5 Min PRN Hodan Loud, DO   0.4 mg at 02/06/23 2234    enoxaparin (LOVENOX) injection 120 mg  1 mg/kg SubCUTAneous BID Korina Pantelis, APRN - CNP   120 mg at 02/08/23 0816    melatonin tablet 3 mg  3 mg Oral Nightly PRN Viet Flowers MD   3 mg at 02/07/23 2013    hydrOXYzine pamoate (VISTARIL) capsule 25 mg  25 mg Oral TID PRN Viet Flowers MD           Allergies:  No Known Allergies    Problem List:    Patient Active Problem List   Diagnosis Code    Chest pain R07.9    Closed fracture of nasal bones S02. 2XXA    Injury of face S09. 93XA    Retroperitoneal hematoma K66.1    Vitamin D deficiency E55.9    Acute chest pain R07.9    Acute cholecystitis K81.0    Gastrointestinal hemorrhage K92.2    Polyp of colon K63.5    Right upper quadrant abdominal pain R10.11    Diarrhea R19.7    Current moderate episode of major depressive disorder without prior episode (HCC) F32.1    Hypertension I10    S/P MVR (mitral valve replacement) Z95.2    S/P AVR (aortic valve replacement) Z95.2    Coronary artery disease involving native coronary artery of native heart without angina pectoris I25.10       Past Medical History:        Diagnosis Date    Al[facility] disease ([facility] [address]     H/O prosthetic aortic valve replacement 09/2020    [facility] - main  Mitral and pacemaker at this time as well    History of mitral valve replacement 09/2020    at [place] [address] - main  Aortic Valve placed at this time as well as pacemaker    Stroke [facility])     Testicular cancer ([facility] [address]     in remission since 1988       Past Surgical History:        Procedure Laterality Date    COLONOSCOPY N/A 8/24/2022    COLONOSCOPY POLYPECTOMY HOT BIOPSY performed by Ara Doan MD at [address] [place] [address] Ave Fracture 1/37/7796     PACEMAKER PLACEMENT  09/2020    UPPER GASTROINTESTINAL ENDOSCOPY N/A 8/24/2022    EGD DIAGNOSTIC ONLY performed by [facility]an MD at [facility] History:    Social History     Tobacco Use    Smoking status: Every Day     Packs/day: 1.00     Years: 35.00     Pack years: 35.00     Types: Cigarettes    Smokeless tobacco: Never   Substance Use Topics    Alcohol use: Yes     Comment: occassional                                Ready to quit: Not Answered  Counseling given: Not Answered      Vital Signs (Current):   Vitals:    02/07/23 2010 02/08/23 0021 02/08/23 0040 02/08/23 0707   BP: (!) 145/96 (!) 145/91  (!) 159/100   Pulse: 80 79  85   Resp: 19 20  20   Temp: 98 °F (36.7 °C) 98.4 °F (36.9 °C)  98.1 °F (36.7 °C)   TempSrc: Oral Oral  Oral   SpO2: 99% 100%  97%   Weight:   255 lb 14.4 oz (116.1 kg)    Height:                                                  BP Readings from Last 3 Encounters:   02/08/23 (!) 159/100   09/15/22 (!) 161/104   09/07/22 (!) 150/86       NPO Status: Time of last liquid consumption: 0000                        Time of last solid consumption: 0000                        Date of last liquid consumption: 02/08/23                        Date of last solid food consumption: 02/08/23    BMI:   Wt Readings from Last 3 Encounters:   02/08/23 255 lb 14.4 oz (116.1 kg)   09/15/22 250 lb (113.4 kg)   09/07/22 254 lb (115.2 kg)     Body mass index is 36.72 kg/m². CBC:   Lab Results   Component Value Date/Time    WBC 8.0 02/08/2023 02:36 AM    RBC 4.23 02/08/2023 02:36 AM    HGB 12.2 02/08/2023 02:36 AM    HCT 39.0 02/08/2023 02:36 AM    MCV 92.2 02/08/2023 02:36 AM    RDW 15.7 02/08/2023 02:36 AM     02/08/2023 02:36 AM       CMP:   Lab Results   Component Value Date/Time     02/08/2023 02:38 AM    K 3.7 02/08/2023 02:38 AM    K 3.9 09/15/2022 03:08 PM     02/08/2023 02:38 AM    CO2 24 02/08/2023 02:38 AM    BUN 14 02/08/2023 02:38 AM    CREATININE 1.6 02/08/2023 02:38 AM    GFRAA >60 09/15/2022 03:08 PM    LABGLOM 50 02/08/2023 02:38 AM    GLUCOSE 103 02/08/2023 02:38 AM    PROT 6.8 02/06/2023 09:47 AM    CALCIUM 8.5 02/08/2023 02:38 AM    BILITOT 0.7 02/06/2023 09:47 AM    ALKPHOS 131 02/06/2023 09:47 AM    AST 48 02/06/2023 09:47 AM    ALT 46 02/06/2023 09:47 AM       POC Tests: No results for input(s): POCGLU, POCNA, POCK, POCCL, POCBUN, POCHEMO, POCHCT in the last 72 hours.     Coags:   Lab Results   Component Value Date/Time    PROTIME 17.7 02/08/2023 02:36 AM    PROTIME 96.0 09/15/2022 01:07 PM    INR 1.6 02/08/2023 02:36 AM    APTT 73.7 09/15/2022 03:08 PM       HCG (If Applicable): No results found for: PREGTESTUR, PREGSERUM, HCG, HCGQUANT     ABGs: No results found for: PHART, PO2ART, MXO3IBQ, XYG0GZL, BEART, C3AEFPDG     Type & Screen (If Applicable):  No results found for: LABABO, LABRH    Drug/Infectious Status (If Applicable):  No results found for: HIV, HEPCAB    COVID-19 Screening (If Applicable):   Lab Results   Component Value Date/Time    COVID19 Not Detected 02/07/2023 03:48 PM           Anesthesia Evaluation  Patient summary reviewed  Airway: Mallampati: III  TM distance: >3 FB   Neck ROM: full  Mouth opening: > = 3 FB   Dental: normal exam         Pulmonary:Negative Pulmonary ROS                              Cardiovascular:    (+) hypertension:, valvular problems/murmurs:, pacemaker:, CAD:,         Rhythm: regular  Rate: normal                    Neuro/Psych:   (+) CVA:, psychiatric history:depression/anxiety             GI/Hepatic/Renal:   (+) liver disease:, morbid obesity          Endo/Other: Negative Endo/Other ROS   (+) blood dyscrasia: anticoagulation therapy:., .                 Abdominal:   (+) obese,     Abdomen: soft. Vascular: Other Findings:           Anesthesia Plan      general     ASA 3             Anesthetic plan and risks discussed with patient. Plan discussed with CRNA.                     Harman Houser MD   2/8/2023  Note reviewed and agree with plan ARNALDO Lr - CRNA

## 2023-02-08 NOTE — PROGRESS NOTES
INPATIENT CARDIOLOGY FOLLOW-UP    Name: Disha Ferro    Age: 65 y.o. Date of Admission: 2/6/2023  9:13 AM    Date of Service: 2/8/2023    Primary Cardiologist: Dr Pradip Marie    Chief Complaint: Follow-up for chest pain    Interim History:  Seen this morning prior to BEATRIZ. Still with shortness of breath with exertion and chest pain with deep breathing. Was given IV furosemide yesterday urinated quite a bit but states this did not help his breathing. BEATRIZ was done, valves appear to be functioning normally, there appears to be mild to moderate perivalvular mitral regurgitation due to a small annular perforation outside the sewing ring of the valve.     Review of Systems:   Negative except as described above    Problem List:  Patient Active Problem List   Diagnosis    Chest pain    Closed fracture of nasal bones    Injury of face    Retroperitoneal hematoma    Vitamin D deficiency    Acute chest pain    Acute cholecystitis    Gastrointestinal hemorrhage    Polyp of colon    Right upper quadrant abdominal pain    Diarrhea    Current moderate episode of major depressive disorder without prior episode ([facility] [address]    Hypertension    S/P MVR (mitral valve replacement)    S/P AVR (aortic valve replacement)    Coronary artery disease involving native coronary artery of native heart without angina pectoris       Current Medications:    Current Facility-Administered Medications:     morphine (PF) injection 2 mg, 2 mg, IntraVENous, Q4H PRN, Ankita Ingram, APRN - CNP, 2 mg at 02/08/23 0857    amLODIPine (NORVASC) tablet 5 mg, 5 mg, Oral, Daily, Annie HALL [place], DO, 5 mg at 02/08/23 0815    aspirin EC tablet 81 mg, 81 mg, Oral, Daily, Annie J [place], DO, 81 mg at 02/08/23 0815    DULoxetine (CYMBALTA) extended release capsule 30 mg, 30 mg, Oral, Daily, Annie J Chuck, DO, 30 mg at 02/08/23 0815    metoprolol succinate (TOPROL XL) extended release tablet 25 mg, 25 mg, Oral, Daily, Annie J [place], DO, 25 mg at 02/08/23 5512 nicotine polacrilex (COMMIT) lozenge 2 mg, 2 mg, Oral, Q2H PRN, Annie Hardenon, DO, 2 mg at 02/08/23 0545    pantoprazole (PROTONIX) tablet 40 mg, 40 mg, Oral, BID AC, Annie HALL [place], DO, 40 mg at 02/08/23 0545    sucralfate (CARAFATE) tablet 1 g, 1 g, Oral, 4x Daily AC & HS, nAnie HALL [place], DO, 1 g at 02/08/23 0545    warfarin (COUMADIN) tablet 2.5 mg, 2.5 mg, Oral, Daily, Annie HALL [place], DO, 2.5 mg at 02/07/23 1726    sodium chloride flush 0.9 % injection 5-40 mL, 5-40 mL, IntraVENous, 2 times per day, Annie Hardenon, DO, 10 mL at 02/08/23 0815    sodium chloride flush 0.9 % injection 5-40 mL, 5-40 mL, IntraVENous, PRN, Annie Hardenon, DO    0.9 % sodium chloride infusion, , IntraVENous, PRN, Annie HALL [place], DO    ondansetron (ZOFRAN-ODT) disintegrating tablet 4 mg, 4 mg, Oral, Q8H PRN **OR** ondansetron (ZOFRAN) injection 4 mg, 4 mg, IntraVENous, Q6H PRN, Annie HALL [place], DO    polyethylene glycol (GLYCOLAX) packet 17 g, 17 g, Oral, Daily PRN, Annie HALL [place], DO    acetaminophen (TYLENOL) tablet 650 mg, 650 mg, Oral, Q6H PRN, 650 mg at 02/06/23 2234 **OR** acetaminophen (TYLENOL) suppository 650 mg, 650 mg, Rectal, Q6H PRN, Annie HALL [place], DO    nitroGLYCERIN (NITROSTAT) SL tablet 0.4 mg, 0.4 mg, SubLINGual, Q5 Min PRN, Annie [place], DO, 0.4 mg at 02/06/23 2234    enoxaparin (LOVENOX) injection 120 mg, 1 mg/kg, SubCUTAneous, BID, Korina Wilkinson, APRN - CNP, 120 mg at 02/08/23 0816    melatonin tablet 3 mg, 3 mg, Oral, Nightly PRN, Feil Garces MD, 3 mg at 02/07/23 2013    hydrOXYzine pamoate (VISTARIL) capsule 25 mg, 25 mg, Oral, TID PRN, Feli Garces MD    Physical Exam:  BP (!) 94/54   Pulse 73   Temp 98.1 °F (36.7 °C) (Oral)   Resp 22   Ht 5' 10\" (1.778 m)   Wt 255 lb 14.4 oz (116.1 kg)   SpO2 96%   BMI 36.72 kg/m²   Wt Readings from Last 3 Encounters:   02/08/23 255 lb 14.4 oz (116.1 kg)   09/15/22 250 lb (113.4 kg)   09/07/22 254 lb (115.2 kg)     Appearance: Obese, awake, alert, no acute respiratory distress  Skin: Intact, no rash  Head: Normocephalic, atraumatic  Eyes: EOMI, no conjunctival erythema  ENMT: No pharyngeal erythema, MMM, no rhinorrhea  Neck: Supple, no elevated JVP, no carotid bruits  Lungs: Clear to auscultation bilaterally. No wheezes, rales, or rhonchi. Cardiac: PMI nondisplaced, Regular rhythm with a normal rate, mechanical crisp sounding S1 & S2 normal, no murmurs. Right chest pacemaker.   Chest wall tender to palpation reproducing chest pain symptoms  Abdomen: Soft, nontender, +bowel sounds  Extremities: Moves all extremities x 4, no lower extremity edema  Neurologic: No focal motor deficits apparent, normal mood and affect  Peripheral Pulses: Intact posterior tibial pulses bilaterally    Intake/Output:    Intake/Output Summary (Last 24 hours) at 2/8/2023 1155  Last data filed at 2/8/2023 1112  Gross per 24 hour   Intake 300 ml   Output 1800 ml   Net -1500 ml     I/O this shift:  In: 300 [I.V.:300]  Out: -     Laboratory Tests:  Recent Labs     02/06/23  0947 02/07/23  1015 02/08/23  0238    136 136   K 3.1* 3.9 3.7   CL 98 104 102   CO2 19* 22 24   BUN 7 12 14   CREATININE 1.4* 1.6* 1.6*   GLUCOSE 145* 106* 103*   CALCIUM 8.3* 8.6 8.5*     Lab Results   Component Value Date/Time    MG 1.7 08/25/2022 04:44 AM     Recent Labs     02/06/23  0947   ALKPHOS 131*   ALT 46*   AST 48*   PROT 6.8   BILITOT 0.7   LABALBU 3.7     Recent Labs     02/06/23  0947 02/07/23  1015 02/08/23  0236   WBC 5.5 5.5 8.0   RBC 4.17 3.93 4.23   HGB 12.4* 11.7* 12.2*   HCT 39.1 36.9* 39.0   MCV 93.8 93.9 92.2   MCH 29.7 29.8 28.8   MCHC 31.7* 31.7* 31.3*   RDW 15.8* 15.8* 15.7*    156 135   MPV 11.7 12.0 11.8     No results found for: CKTOTAL, CKMB, CKMBINDEX, TROPONINI  Lab Results   Component Value Date    INR 1.6 02/08/2023    INR 1.7 02/07/2023    INR 1.8 02/06/2023    PROTIME 17.7 (H) 02/08/2023    PROTIME 18.3 (H) 02/07/2023    PROTIME 20.2 (H) 02/06/2023     Lab Results   Component Value Date    TSH 2.590 2023     Lab Results   Component Value Date    LABA1C 4.9 2023     No results found for: EAG  Lab Results   Component Value Date    CHOL 134 2023    CHOL 86 2022     Lab Results   Component Value Date    TRIG 146 2023    TRIG 149 2022     Lab Results   Component Value Date    HDL 33 2023    HDL 28 2022     Lab Results   Component Value Date    LDLCALC 72 2023    LDLCALC 28 2022     Lab Results   Component Value Date    LABVLDL 29 2023    LABVLDL 30 2022     No results found for: CHOLHDLRATIO  Recent Labs     23  0947   PROBNP 246*       Cardiac Tests:    EK2023: Sinus tachycardia 104 bpm.  RBBB/LAFB QRS duration 160 ms    Telemetry: Sinus rhythm 80s    Chest X-ray:   23  Personally reviewed. Right-sided dual-chamber pacemaker. Sternotomy wires. Mechanical aortic and mitral valve    Impression   Postsurgical changes. Enlarged cardiomediastinal silhouette. Low lung   volumes and elevated right hemidiaphragm. Echocardiogram:   TTE 23   Summary   Micro-bubble contrast injected to enhance left ventricular visualization. Normal left ventricular size and systolic function. Ejection fraction is visually estimated at 55%. Indeterminate diastolic function. No regional wall motion abnormalities seen. Mild left ventricular concentric hypertrophy noted. Mildly dilated right ventricle with normal function. Biatrial dilation. 29 mm [place] mechanical mitral prosthesis present. Conflicting Doppler parameters, some suggestive of prosthetic valve   stenosis. 23 mm [employer] mechanical aortic valve prosthesis present and functioning normally. 2020 TTE [facility]: EF 55-60%. Abnormal septal motion consistent with post-operative status. #29 [employer] Jose bileaflet mechanical valve. MV gradient 5.4 mmHg and trival prosthetic MR. Mildly elevated RVSP (35.3 mmHg). Mild to moderate TR.  #23 St Jose's bileaflet mechanical aortic valve with gradients 21.9/12.3 mmHg and DI of 0.53 and trivial AI. When compared to previous TTE AV gradients are similar. No MV gradients were obtained on previous TTE. Stress test:      Cardiac catheterization:  9/11/2020 [facility] JOSE LAD mid vessel 95% stenosis. LCx posterolateral extension 70% stenosis     Device interrogation 2/6/2023  Medtronic  12.1 years battery life remaining  DDDR LRL 60 bpm  0.4% ventricular pacing  1.0% atrial pacing  Less than 0.1% AT/AF burden  12 NSVT    ----------------------------------------------------------------------------------------------------------------------------------------------------------------  IMPRESSION:  Atypical chest pain. Noncardiac likely musculoskeletal  Mechanical [place] AVR and MVR 2020  for severe MR/AR, noncompliant with warfarin with subtherapeutic INR 1.6  Mild to moderate paravalvular MR outside sewing ring due to annular perforation noted on BEATRIZ 2/8/2023  CAD, single-vessel CABG LIMA-LAD at time of valve surgery  Postoperative complete heart block s/p dual-chamber PPM Medtronic  LBBB/LAFB  Chronic HFpEF. Does not appear hypervolemic.  proBNP 246  Chronically elevated hs-cTnT  Dyspnea likely multifactorial  CKD creatinine 1.4-1.6  Hypokalemia, improved  Mildly elevated LFTs  Hypertension poorly controlled  Hyperlipidemia  History of CVA  History of testicular cancer  Tobacco abuse  Alcohol abuse  Obesity    RECOMMENDATIONS:  BEATRIZ as above showing normal prosthetic valve function, however evidence of mitral annular perforation and some degree of paravalvular regurgitation which does not appear severe, not sure if this may be contributing to his dyspnea.     Consider outpatient evaluation for percutaneous repair of paravalvular leak  Continue warfarin, goal INR 3.0 (2.5-3.5) still subtherapeutic and INR trending down every day  Continue enoxaparin until therapeutic  Continue aspirin, statin, metoprolol succinate, amlodipine  Maintain electrolyte replacement  Reiterated importance of compliance with anticoagulation  Outpatient sleep study if he has never had one  Aggressive risk factor modification including smoking cessation and weight loss recommended  Further care per primary service    Victoria Lopez MD, [address] [place] [address] Cardiology    NOTE: This report was transcribed using voice recognition software. Every effort was made to ensure accuracy; however, inadvertent computerized transcription errors may be present.

## 2023-02-08 NOTE — CARE COORDINATION
Social Work/Discharge Planning:  Patient for a BEATRIZ today. Plan remains home at discharge. Will continue to follow.   Electronically signed by ANJEL Stearns on 2/8/2023 at 9:24 AM

## 2023-02-08 NOTE — PROCEDURES
PRELIMINARY TRANSESOPHAGEAL ECHOCARDIOGRAPHY REPORT    Date of Procedure: 2/8/2023    Indication:  Mechanical MV and AV    Sedation: Propofol    Complications: None    Preliminary findings:  Mechanical mitral and aortic valves appears to be functioning normally    There appears to be mild-moderate paravalvular mitral regurgitation arising from the annulus from a small annular perforation outside the sewing ring at the anterolateral aspect of the mitral annulus    Full report to follow    Eduardo Martinez MD, [address] [facility] Cardiology

## 2023-02-08 NOTE — ANESTHESIA POSTPROCEDURE EVALUATION
Department of Anesthesiology  Postprocedure Note    Patient: Susan Jovel  MRN: [id number]  YOB: 1965  Date of evaluation: 2/8/2023      Procedure Summary     Date: 02/08/23 Room / Location: [facility] Echocardiography    Anesthesia Start: 1037 Anesthesia Stop:     Procedure: TRANSESOPHAGEAL ECHO Diagnosis:     Scheduled Providers:  Responsible Provider: Julissa Montoya MD    Anesthesia Type: general ASA Status: 3          Anesthesia Type: No value filed.     Efren Phase I: Efren Score: 10    Efren Phase II:        Anesthesia Post Evaluation    Patient location during evaluation: PACU  Patient participation: complete - patient participated  Level of consciousness: lethargic  Pain score: 0  Airway patency: patent  Nausea & Vomiting: no nausea and no vomiting  Complications: no  Cardiovascular status: blood pressure returned to baseline  Respiratory status: acceptable  Hydration status: euvolemic

## 2023-02-09 LAB
ANION GAP SERPL CALCULATED.3IONS-SCNC: 11 MMOL/L (ref 7–16)
APTT: 52.4 SEC (ref 24.5–35.1)
BUN BLDV-MCNC: 17 MG/DL (ref 6–20)
CALCIUM SERPL-MCNC: 8.4 MG/DL (ref 8.6–10.2)
CHLORIDE BLD-SCNC: 103 MMOL/L (ref 98–107)
CO2: 23 MMOL/L (ref 22–29)
CREAT SERPL-MCNC: 1.7 MG/DL (ref 0.7–1.2)
GFR SERPL CREATININE-BSD FRML MDRD: 47 ML/MIN/1.73
GLUCOSE BLD-MCNC: 99 MG/DL (ref 74–99)
HCT VFR BLD CALC: 36.7 % (ref 37–54)
HEMOGLOBIN: 11.6 G/DL (ref 12.5–16.5)
INR BLD: 1.5
MCH RBC QN AUTO: 29.6 PG (ref 26–35)
MCHC RBC AUTO-ENTMCNC: 31.6 % (ref 32–34.5)
MCV RBC AUTO: 93.6 FL (ref 80–99.9)
PDW BLD-RTO: 15.9 FL (ref 11.5–15)
PLATELET # BLD: 134 E9/L (ref 130–450)
PMV BLD AUTO: 12.4 FL (ref 7–12)
POTASSIUM SERPL-SCNC: 4 MMOL/L (ref 3.5–5)
PROTHROMBIN TIME: 16.8 SEC (ref 9.3–12.4)
RBC # BLD: 3.92 E12/L (ref 3.8–5.8)
SODIUM BLD-SCNC: 137 MMOL/L (ref 132–146)
WBC # BLD: 8 E9/L (ref 4.5–11.5)

## 2023-02-09 PROCEDURE — 2060000000 HC ICU INTERMEDIATE R&B

## 2023-02-09 PROCEDURE — 6360000002 HC RX W HCPCS

## 2023-02-09 PROCEDURE — 6360000002 HC RX W HCPCS: Performed by: INTERNAL MEDICINE

## 2023-02-09 PROCEDURE — 85027 COMPLETE CBC AUTOMATED: CPT

## 2023-02-09 PROCEDURE — 99233 SBSQ HOSP IP/OBS HIGH 50: CPT | Performed by: INTERNAL MEDICINE

## 2023-02-09 PROCEDURE — 85610 PROTHROMBIN TIME: CPT

## 2023-02-09 PROCEDURE — 2580000003 HC RX 258: Performed by: INTERNAL MEDICINE

## 2023-02-09 PROCEDURE — 96376 TX/PRO/DX INJ SAME DRUG ADON: CPT

## 2023-02-09 PROCEDURE — 6370000000 HC RX 637 (ALT 250 FOR IP): Performed by: INTERNAL MEDICINE

## 2023-02-09 PROCEDURE — 85730 THROMBOPLASTIN TIME PARTIAL: CPT

## 2023-02-09 PROCEDURE — 96366 THER/PROPH/DIAG IV INF ADDON: CPT

## 2023-02-09 PROCEDURE — 80048 BASIC METABOLIC PNL TOTAL CA: CPT

## 2023-02-09 PROCEDURE — 36415 COLL VENOUS BLD VENIPUNCTURE: CPT

## 2023-02-09 RX ORDER — WARFARIN SODIUM 5 MG/1
10 TABLET ORAL DAILY
Status: DISCONTINUED | OUTPATIENT
Start: 2023-02-09 | End: 2023-02-11

## 2023-02-09 RX ORDER — FUROSEMIDE 10 MG/ML
40 INJECTION INTRAMUSCULAR; INTRAVENOUS ONCE
Status: COMPLETED | OUTPATIENT
Start: 2023-02-09 | End: 2023-02-09

## 2023-02-09 RX ADMIN — MORPHINE SULFATE 2 MG: 2 INJECTION, SOLUTION INTRAMUSCULAR; INTRAVENOUS at 10:23

## 2023-02-09 RX ADMIN — FUROSEMIDE 40 MG: 10 INJECTION, SOLUTION INTRAVENOUS at 11:31

## 2023-02-09 RX ADMIN — MORPHINE SULFATE 2 MG: 2 INJECTION, SOLUTION INTRAMUSCULAR; INTRAVENOUS at 18:32

## 2023-02-09 RX ADMIN — SUCRALFATE 1 G: 1 TABLET ORAL at 11:16

## 2023-02-09 RX ADMIN — PANTOPRAZOLE SODIUM 40 MG: 40 TABLET, DELAYED RELEASE ORAL at 16:09

## 2023-02-09 RX ADMIN — Medication 2 MG: at 01:30

## 2023-02-09 RX ADMIN — SUCRALFATE 1 G: 1 TABLET ORAL at 06:23

## 2023-02-09 RX ADMIN — MORPHINE SULFATE 2 MG: 2 INJECTION, SOLUTION INTRAMUSCULAR; INTRAVENOUS at 14:48

## 2023-02-09 RX ADMIN — DULOXETINE HYDROCHLORIDE 30 MG: 30 CAPSULE, DELAYED RELEASE ORAL at 08:59

## 2023-02-09 RX ADMIN — METOPROLOL SUCCINATE 25 MG: 25 TABLET, FILM COATED, EXTENDED RELEASE ORAL at 08:59

## 2023-02-09 RX ADMIN — HEPARIN SODIUM 8 UNITS/KG/HR: 10000 INJECTION, SOLUTION INTRAVENOUS at 17:42

## 2023-02-09 RX ADMIN — Medication 2 MG: at 14:48

## 2023-02-09 RX ADMIN — MORPHINE SULFATE 2 MG: 2 INJECTION, SOLUTION INTRAMUSCULAR; INTRAVENOUS at 06:22

## 2023-02-09 RX ADMIN — PANTOPRAZOLE SODIUM 40 MG: 40 TABLET, DELAYED RELEASE ORAL at 06:23

## 2023-02-09 RX ADMIN — MORPHINE SULFATE 2 MG: 2 INJECTION, SOLUTION INTRAMUSCULAR; INTRAVENOUS at 22:30

## 2023-02-09 RX ADMIN — SUCRALFATE 1 G: 1 TABLET ORAL at 16:09

## 2023-02-09 RX ADMIN — ASPIRIN 81 MG: 81 TABLET, COATED ORAL at 08:59

## 2023-02-09 RX ADMIN — MORPHINE SULFATE 2 MG: 2 INJECTION, SOLUTION INTRAMUSCULAR; INTRAVENOUS at 01:24

## 2023-02-09 RX ADMIN — SUCRALFATE 1 G: 1 TABLET ORAL at 20:00

## 2023-02-09 RX ADMIN — Medication 2 MG: at 11:25

## 2023-02-09 RX ADMIN — AMLODIPINE BESYLATE 5 MG: 5 TABLET ORAL at 08:59

## 2023-02-09 RX ADMIN — WARFARIN SODIUM 10 MG: 5 TABLET ORAL at 17:28

## 2023-02-09 RX ADMIN — SODIUM CHLORIDE, PRESERVATIVE FREE 10 ML: 5 INJECTION INTRAVENOUS at 22:30

## 2023-02-09 ASSESSMENT — PAIN DESCRIPTION - ORIENTATION
ORIENTATION: ANTERIOR;RIGHT
ORIENTATION: ANTERIOR

## 2023-02-09 ASSESSMENT — PAIN DESCRIPTION - ONSET
ONSET: ON-GOING
ONSET: ON-GOING

## 2023-02-09 ASSESSMENT — PAIN DESCRIPTION - DESCRIPTORS
DESCRIPTORS: SHARP;SHOOTING;STABBING
DESCRIPTORS: JABBING
DESCRIPTORS: SHARP;STABBING
DESCRIPTORS: SHARP;SHOOTING;STABBING
DESCRIPTORS: STABBING;SHARP

## 2023-02-09 ASSESSMENT — PAIN DESCRIPTION - PAIN TYPE
TYPE: ACUTE PAIN
TYPE: ACUTE PAIN

## 2023-02-09 ASSESSMENT — PAIN - FUNCTIONAL ASSESSMENT
PAIN_FUNCTIONAL_ASSESSMENT: ACTIVITIES ARE NOT PREVENTED
PAIN_FUNCTIONAL_ASSESSMENT: PREVENTS OR INTERFERES SOME ACTIVE ACTIVITIES AND ADLS
PAIN_FUNCTIONAL_ASSESSMENT: ACTIVITIES ARE NOT PREVENTED

## 2023-02-09 ASSESSMENT — PAIN SCALES - GENERAL
PAINLEVEL_OUTOF10: 6
PAINLEVEL_OUTOF10: 3
PAINLEVEL_OUTOF10: 7
PAINLEVEL_OUTOF10: 7
PAINLEVEL_OUTOF10: 6

## 2023-02-09 ASSESSMENT — PAIN DESCRIPTION - FREQUENCY
FREQUENCY: CONTINUOUS
FREQUENCY: CONTINUOUS

## 2023-02-09 ASSESSMENT — PAIN DESCRIPTION - LOCATION
LOCATION: CHEST

## 2023-02-09 NOTE — PROGRESS NOTES
Physician Progress Note      Alka Anderson  CSN #:                  [id number]  :                       1965  ADMIT DATE:       2023 9:13 AM  DISCH DATE:  RESPONDING  PROVIDER #:        Bertha Bernal DO          QUERY TEXT:    Dear Attending Physician,    Pt admitted with atypical chest pain and SOB. Pt noted to have Paravalvular   leak (prosthetic valve), chest pain reproducible upon palpation . If possible,   please document in progress notes and discharge summary if you are evaluating   and/or treating any of the following: The medical record reflects the following:  Risk Factors: Hx prosthetic aortic and mitral valve replacement, chronic HFpEF   ,Tobacco abuse, Alcohol abuse, Obesity, HTN  Clinical Indicators: Per PCP ,\". .. Acute? atypical chest pain -? Chest pain   reproducible upon palpation. .. BEATRIZ indicates that there is mild to moderate   paravalvular MR. As per cardiology, he needs outpatient evaluation of the leak   . [place] \"  Per Cardio ,\". .. given IV furosemide yesterday urinated quite a bit   but states this did not help his breathing. ... BEATRIZ was done, valves appear to   be functioning normally, there appears to be mild to moderate perivalvular   mitral regurgitation due to a small annular perforation outside the sewing   ring of the valve. .. Chest wall tender to palp  Treatment:  Nitro PRN, ASA, Toprol, MS    Thank you,  Giovanny Grimm RN CCDS  Clinical Documentation Improvement Specialist  Options provided:  -- Chest pain due to CAD with unstable angina  -- Chest pain due to costochondritis  -- Chest pain likely due to, Please specify the likely cause of the chest   pain. -- Other - I will add my own diagnosis  -- Disagree - Not applicable / Not valid  -- Disagree - Clinically unable to determine / Unknown  -- Refer to Clinical Documentation Reviewer    PROVIDER RESPONSE TEXT:    This patient has chest pain due to costochondritis.     Query created by: Mauri Altamirano Timur Sandoval on 2/9/2023 9:38 AM      Electronically signed by:  Sandra Romero DO 2/9/2023 9:44 AM

## 2023-02-09 NOTE — PROGRESS NOTES
[facility] Progress Note    Admitting Date and Time: 2/6/2023  9:13 AM  Admit Dx: Chest pain [R07.9]    Subjective:  Patient is being followed for Chest pain [R07.9]     Patient has no complaints at this time    ROS: denies fever, chills, cp, sob, n/v, HA unless stated above.      warfarin  10 mg Oral Daily    amLODIPine  5 mg Oral Daily    aspirin  81 mg Oral Daily    DULoxetine  30 mg Oral Daily    metoprolol succinate  25 mg Oral Daily    pantoprazole  40 mg Oral BID AC    sucralfate  1 g Oral 4x Daily AC & HS    sodium chloride flush  5-40 mL IntraVENous 2 times per day     heparin (porcine), 4,000 Units, PRN  heparin (porcine), 2,000 Units, PRN  morphine, 2 mg, Q4H PRN  nicotine polacrilex, 2 mg, Q2H PRN  sodium chloride flush, 5-40 mL, PRN  sodium chloride, , PRN  ondansetron, 4 mg, Q8H PRN   Or  ondansetron, 4 mg, Q6H PRN  polyethylene glycol, 17 g, Daily PRN  acetaminophen, 650 mg, Q6H PRN   Or  acetaminophen, 650 mg, Q6H PRN  nitroGLYCERIN, 0.4 mg, Q5 Min PRN  melatonin, 3 mg, Nightly PRN  hydrOXYzine pamoate, 25 mg, TID PRN         Objective:    BP (!) 153/103   Pulse 83   Temp 97.5 °F (36.4 °C) (Oral)   Resp 20   Ht 5' 10\" (1.778 m)   Wt 256 lb 6.4 oz (116.3 kg)   SpO2 99%   BMI 36.79 kg/m²     General Appearance: alert and oriented to person, place and time and in no acute distress  Skin: warm and dry  Head: normocephalic and atraumatic  Eyes: pupils equal, round, and reactive to light, extraocular eye movements intact, conjunctivae normal  Neck: neck supple and non tender without mass   Pulmonary/Chest: clear to auscultation bilaterally- no wheezes, rales or rhonchi, normal air movement, no respiratory distress  Cardiovascular: normal rate, normal S1 and S2 and no carotid bruits  Abdomen: soft, non-tender, non-distended, normal bowel sounds, no masses or organomegaly  Extremities: no cyanosis, no clubbing and no edema  Neurologic: no cranial nerve deficit and speech normal        Recent Labs     02/07/23  1015 02/08/23  0238 02/09/23  0255    136 137   K 3.9 3.7 4.0    102 103   CO2 22 24 23   BUN 12 14 17   CREATININE 1.6* 1.6* 1.7*   GLUCOSE 106* 103* 99   CALCIUM 8.6 8.5* 8.4*       Recent Labs     02/08/23  0236 02/08/23  1438 02/09/23  0255   WBC 8.0 7.7 8.0   RBC 4.23 3.86 3.92   HGB 12.2* 11.5* 11.6*   HCT 39.0 36.6* 36.7*   MCV 92.2 94.8 93.6   MCH 28.8 29.8 29.6   MCHC 31.3* 31.4* 31.6*   RDW 15.7* 15.9* 15.9*    137 134   MPV 11.8 12.4* 12.4*       Assessment:    Principal Problem:    Chest pain  Active Problems:    S/P MVR (mitral valve replacement)    S/P AVR (aortic valve replacement)    Coronary artery disease involving native coronary artery of native heart without angina pectoris    Paravalvular leak (prosthetic valve)    Hx of CABG    Warfarin anticoagulation    Subtherapeutic international normalized ratio (INR)    Class 2 severe obesity due to excess calories with serious comorbidity and body mass index (BMI) of 36.0 to 36.9 in [facility])    Dyspnea  Resolved Problems:    * No resolved hospital problems. *      Plan:    Acute atypical chest pain - Chest pain reproducible upon palpation. Continue nitro PRN, asa, and toprol. A1c, lipid panel, and TSH are unremarkable. Cardiology consult appreciated  H/O prosthetic aortic and mitral valve replacement - Goal INR is 2.5 to 3.5. BEATRIZ indicates that there is mild to moderate paravalvular MR. As per cardiology, he needs outpatient evaluation of the leak   CKD III - Baseline appears to be between 1.4 to 1.6  Subtherapeutic INR - Continue heparin and coumadin  H/O CVA  H/O Testicular cancer  H/O alcoholic liver disease  DVT prophylaxis - Coumadin, Heparin      NOTE: This report was transcribed using voice recognition software. Every effort was made to ensure accuracy; however, inadvertent computerized transcription errors may be present.     Electronically signed by Arnaldo Fulton DO on 2/9/2023 at 9:39 AM

## 2023-02-09 NOTE — PROGRESS NOTES
INPATIENT CARDIOLOGY FOLLOW-UP    Name: Asia Joshi    Age: 65 y.o. Date of Admission: 2/6/2023  9:13 AM    Date of Service: 2/9/2023    Primary Cardiologist: Dr Cynthia Foley    Chief Complaint: Follow-up for chest pain    Interim History:  Still with shortness of breath with exertion and chest pain with deep breathing. BEATRIZ was done, valves appear to be functioning normally, there appears to be mild to moderate perivalvular mitral regurgitation due to a small annular perforation outside the sewing ring of the valve. INR remains subtherapeutic, he has been changed from enoxaparin to intravenous heparin.     Review of Systems:   Negative except as described above    Problem List:  Patient Active Problem List   Diagnosis    Chest pain    Closed fracture of nasal bones    Injury of face    Retroperitoneal hematoma    Vitamin D deficiency    Acute chest pain    Acute cholecystitis    Gastrointestinal hemorrhage    Polyp of colon    Right upper quadrant abdominal pain    Diarrhea    Current moderate episode of major depressive disorder without prior episode (HCC)    Hypertension    S/P MVR (mitral valve replacement)    S/P AVR (aortic valve replacement)    Coronary artery disease involving native coronary artery of native heart without angina pectoris    Paravalvular leak (prosthetic valve)    Hx of CABG    Warfarin anticoagulation    Subtherapeutic international normalized ratio (INR)    Class 2 severe obesity due to excess calories with serious comorbidity and body mass index (BMI) of 36.0 to 36.9 in adult [facility])    Dyspnea       Current Medications:    Current Facility-Administered Medications:     warfarin (COUMADIN) tablet 10 mg, 10 mg, Oral, Daily, Annie J [place], DO    heparin (porcine) injection 4,000 Units, 4,000 Units, IntraVENous, PRN, Annie J [place], DO    heparin (porcine) injection 2,000 Units, 2,000 Units, IntraVENous, PRN, Annie J [place], DO    heparin 25,000 units in dextrose 5% 250 mL (premix) infusion, 5-30 Units/kg/hr, IntraVENous, Continuous, Annie Woods, DO, Last Rate: 9.3 mL/hr at 02/08/23 1446, 8 Units/kg/hr at 02/08/23 1446    morphine (PF) injection 2 mg, 2 mg, IntraVENous, Q4H PRN, Ankita Ingram, APRN - CNP, 2 mg at 02/09/23 1023    amLODIPine (NORVASC) tablet 5 mg, 5 mg, Oral, Daily, Annie Hardenon, DO, 5 mg at 02/09/23 0859    aspirin EC tablet 81 mg, 81 mg, Oral, Daily, Annie Hardenon, DO, 81 mg at 02/09/23 0859    DULoxetine (CYMBALTA) extended release capsule 30 mg, 30 mg, Oral, Daily, Annie Hardenon, DO, 30 mg at 02/09/23 0859    metoprolol succinate (TOPROL XL) extended release tablet 25 mg, 25 mg, Oral, Daily, Annie Hardenon, DO, 25 mg at 02/09/23 7798    nicotine polacrilex (COMMIT) lozenge 2 mg, 2 mg, Oral, Q2H PRN, Annie Hardenon, DO, 2 mg at 02/09/23 0130    pantoprazole (PROTONIX) tablet 40 mg, 40 mg, Oral, BID AC, Annie HALL [place], DO, 40 mg at 02/09/23 0623    sucralfate (CARAFATE) tablet 1 g, 1 g, Oral, 4x Daily AC & HS, Annie HALL [place], DO, 1 g at 02/09/23 1116    sodium chloride flush 0.9 % injection 5-40 mL, 5-40 mL, IntraVENous, 2 times per day, Annie Hardenon, DO, 10 mL at 02/08/23 2129    sodium chloride flush 0.9 % injection 5-40 mL, 5-40 mL, IntraVENous, PRN, Annie Hardenon, DO    0.9 % sodium chloride infusion, , IntraVENous, PRN, Annie Hardenon, DO    ondansetron (ZOFRAN-ODT) disintegrating tablet 4 mg, 4 mg, Oral, Q8H PRN **OR** ondansetron (ZOFRAN) injection 4 mg, 4 mg, IntraVENous, Q6H PRN, Annie Woods DO    polyethylene glycol (GLYCOLAX) packet 17 g, 17 g, Oral, Daily PRN, Annie Woods DO    acetaminophen (TYLENOL) tablet 650 mg, 650 mg, Oral, Q6H PRN, 650 mg at 02/06/23 2234 **OR** acetaminophen (TYLENOL) suppository 650 mg, 650 mg, Rectal, Q6H PRN, Annie Woods DO    nitroGLYCERIN (NITROSTAT) SL tablet 0.4 mg, 0.4 mg, SubLINGual, Q5 Min PRN, Annie Woods DO, 0.4 mg at 02/06/23 2234    melatonin tablet 3 mg, 3 mg, Oral, Nightly PRN, Isi French MD, 3 mg at 02/08/23 2119    hydrOXYzine pamoate (VISTARIL) capsule 25 mg, 25 mg, Oral, TID PRN, Jaquan Lawton MD    Physical Exam:  BP (!) 153/103   Pulse 83   Temp 97.5 °F (36.4 °C) (Oral)   Resp 20   Ht 5' 10\" (1.778 m)   Wt 256 lb 6.4 oz (116.3 kg)   SpO2 99%   BMI 36.79 kg/m²   Wt Readings from Last 3 Encounters:   02/09/23 256 lb 6.4 oz (116.3 kg)   09/15/22 250 lb (113.4 kg)   09/07/22 254 lb (115.2 kg)     Appearance: Obese, awake, alert, no acute respiratory distress  Skin: Intact, no rash  Head: Normocephalic, atraumatic  Eyes: EOMI, no conjunctival erythema  ENMT: No pharyngeal erythema, MMM, no rhinorrhea  Neck: Supple, no elevated JVP, no carotid bruits  Lungs: Clear to auscultation bilaterally. No wheezes, rales, or rhonchi. Cardiac: PMI nondisplaced, Regular rhythm with a normal rate, mechanical crisp sounding S1 & S2 normal, no murmurs. Right chest pacemaker. Chest wall tender to palpation reproducing chest pain symptoms  Abdomen: Soft, nontender, +bowel sounds  Extremities: Moves all extremities x 4, no lower extremity edema  Neurologic: No focal motor deficits apparent, normal mood and affect  Peripheral Pulses: Intact posterior tibial pulses bilaterally    Intake/Output:    Intake/Output Summary (Last 24 hours) at 2/9/2023 1118  Last data filed at 2/9/2023 6926  Gross per 24 hour   Intake --   Output 1650 ml   Net -1650 ml     No intake/output data recorded. Laboratory Tests:  Recent Labs     02/07/23  1015 02/08/23  0238 02/09/23  0255    136 137   K 3.9 3.7 4.0    102 103   CO2 22 24 23   BUN 12 14 17   CREATININE 1.6* 1.6* 1.7*   GLUCOSE 106* 103* 99   CALCIUM 8.6 8.5* 8.4*     Lab Results   Component Value Date/Time    MG 1.7 08/25/2022 04:44 AM     No results for input(s): ALKPHOS, ALT, AST, PROT, BILITOT, BILIDIR, LABALBU in the last 72 hours.     Recent Labs     02/08/23  0236 02/08/23  1438 02/09/23  0255   WBC 8.0 7.7 8.0   RBC 4.23 3.86 3.92   HGB 12.2* 11.5* 11.6*   HCT 39.0 36.6* 36.7*   MCV 92.2 94.8 93.6   MCH 28.8 29.8 29.6   MCHC 31.3* 31.4* 31.6*   RDW 15.7* 15.9* 15.9*    137 134   MPV 11.8 12.4* 12.4*     No results found for: CKTOTAL, CKMB, CKMBINDEX, TROPONINI  Lab Results   Component Value Date    INR 1.5 2023    INR 1.6 2023    INR 1.7 2023    PROTIME 16.8 (H) 2023    PROTIME 17.7 (H) 2023    PROTIME 18.3 (H) 2023     Lab Results   Component Value Date    TSH 2.590 2023     Lab Results   Component Value Date    LABA1C 4.9 2023     No results found for: EAG  Lab Results   Component Value Date    CHOL 134 2023    CHOL 86 2022     Lab Results   Component Value Date    TRIG 146 2023    TRIG 149 2022     Lab Results   Component Value Date    HDL 33 2023    HDL 28 2022     Lab Results   Component Value Date    LDLCALC 72 2023    LDLCALC 28 2022     Lab Results   Component Value Date    LABVLDL 29 2023    LABVLDL 30 2022     No results found for: CHOLHDLRATIO  No results for input(s): PROBNP in the last 72 hours. Cardiac Tests:    EK2023: Sinus tachycardia 104 bpm.  RBBB/LAFB QRS duration 160 ms    Telemetry: Sinus rhythm 80s    Chest X-ray:   23  Personally reviewed. Right-sided dual-chamber pacemaker. Sternotomy wires. Mechanical aortic and mitral valve    Impression   Postsurgical changes. Enlarged cardiomediastinal silhouette. Low lung   volumes and elevated right hemidiaphragm. Echocardiogram:   TTE 23   Summary   Micro-bubble contrast injected to enhance left ventricular visualization. Normal left ventricular size and systolic function. Ejection fraction is visually estimated at 55%. Indeterminate diastolic function. No regional wall motion abnormalities seen. Mild left ventricular concentric hypertrophy noted. Mildly dilated right ventricle with normal function. Biatrial dilation.    29 mm [employer] Jose mechanical mitral prosthesis present. Conflicting Doppler parameters, some suggestive of prosthetic valve   stenosis. 23 mm St Jose mechanical aortic valve prosthesis present and functioning normally. 11/5/2020 [facility]: EF 55-60%. Abnormal septal motion consistent with post-operative status. #29 St Jose bileaflet mechanical valve. MV gradient 5.4 mmHg and trival prosthetic MR. Mildly elevated RVSP (35.3 mmHg). Mild to moderate TR. #23 St Jose's bileaflet mechanical aortic valve with gradients 21.9/12.3 mmHg and DI of 0.53 and trivial AI. When compared to previous TTE AV gradients are similar. No MV gradients were obtained on previous TTE. Stress test:      Cardiac catheterization:  9/11/2020 [facility] JOSE LAD mid vessel 95% stenosis. LCx posterolateral extension 70% stenosis     Device interrogation 2/6/2023  Medtronic  12.1 years battery life remaining  DDDR LRL 60 bpm  0.4% ventricular pacing  1.0% atrial pacing  Less than 0.1% AT/AF burden  12 NSVT    ----------------------------------------------------------------------------------------------------------------------------------------------------------------  IMPRESSION:  Atypical chest pain. Noncardiac appears musculoskeletal  Mechanical [place] AVR and MVR 2020  for severe MR/AR, noncompliant with warfarin with subtherapeutic INR 1.8->1.5  Mild to moderate paravalvular MR outside sewing ring due to annular perforation noted on BEATRIZ 2/8/2023  CAD, single-vessel CABG LIMA-LAD at time of valve surgery  Postoperative complete heart block s/p dual-chamber PPM Medtronic  LBBB/LAFB  Chronic HFpEF.   Does not appear hypervolemic.  proBNP 246  Chronically elevated hs-cTnT  Dyspnea likely multifactorial  CKD creatinine 1.4-1.6 1.7 today  Hypokalemia, improved  Mildly elevated LFTs  Hypertension  Hyperlipidemia  History of CVA  History of testicular cancer  Tobacco abuse  Alcohol abuse  Obesity    RECOMMENDATIONS:  BEATRIZ as above showing normal prosthetic valve function, however evidence of mitral annular perforation and some degree of paravalvular regurgitation which does not appear severe, not sure if this may be contributing to his dyspnea. Recommend outpatient evaluation at [facility] for percutaneous repair of paravalvular leak  Repeat furosemide 40 mg IV x1  Continue warfarin, goal INR 3.0 (2.5-3.5) still subtherapeutic and INR trending down every day now on 5 mg daily  Continue enoxaparin until therapeutic, now on IV heparin which will require him to stay in the hospital longer, however from my standpoint could be discharged home with therapeutic enoxaparin until INR therapeutic  Continue aspirin, statin, metoprolol succinate, amlodipine  Maintain electrolyte replacement  Reiterated importance of compliance with anticoagulation  Outpatient sleep study if he has never had one  Aggressive risk factor modification including smoking cessation and weight loss recommended  Trial NSAIDs for musculoskeletal chest pain  Further care per primary service    Mitzy Sims MD, [address] [place] [address] Cardiology    NOTE: This report was transcribed using voice recognition software. Every effort was made to ensure accuracy; however, inadvertent computerized transcription errors may be present.

## 2023-02-09 NOTE — PLAN OF CARE
Problem: ABCDS Injury Assessment  Goal: Absence of physical injury  2/9/2023 1612 by Jane Willis RN  Outcome: Progressing     Problem: Discharge Planning  Goal: Discharge to home or other facility with appropriate resources  2/9/2023 1612 by Jane Willis RN  Outcome: Progressing     Problem: Pain  Goal: Verbalizes/displays adequate comfort level or baseline comfort level  2/9/2023 1612 by Jane Willis RN  Outcome: Progressing     Problem: Safety - Adult  Goal: Free from fall injury  2/9/2023 1612 by Jane Willis RN  Outcome: Progressing     Problem: Chronic Conditions and Co-morbidities  Goal: Patient's chronic conditions and co-morbidity symptoms are monitored and maintained or improved  2/9/2023 1612 by Jane Willis RN  Outcome: Progressing

## 2023-02-09 NOTE — CARE COORDINATION
Social Work/Discharge Planning:  Chart reviewed. Patient had a BEATRIZ yesterday. Met with patient and his fiance and confirmed plan remains home at discharge. He states his PCP never returned call regarding warfarin. He is requesting a script for his warfarin and nicotine lozenges at discharge. Will continue to follow.   Electronically signed by ANJEL Alva on 2/9/2023 at 12:04 PM

## 2023-02-10 LAB
ANION GAP SERPL CALCULATED.3IONS-SCNC: 12 MMOL/L (ref 7–16)
APTT: 58.2 SEC (ref 24.5–35.1)
BUN BLDV-MCNC: 22 MG/DL (ref 6–20)
CALCIUM SERPL-MCNC: 9 MG/DL (ref 8.6–10.2)
CHLORIDE BLD-SCNC: 102 MMOL/L (ref 98–107)
CO2: 22 MMOL/L (ref 22–29)
CREAT SERPL-MCNC: 1.8 MG/DL (ref 0.7–1.2)
GFR SERPL CREATININE-BSD FRML MDRD: 43 ML/MIN/1.73
GLUCOSE BLD-MCNC: 107 MG/DL (ref 74–99)
INR BLD: 1.7
POTASSIUM SERPL-SCNC: 3.7 MMOL/L (ref 3.5–5)
PRO-BNP: 456 PG/ML (ref 0–125)
PROTHROMBIN TIME: 19.2 SEC (ref 9.3–12.4)
SODIUM BLD-SCNC: 136 MMOL/L (ref 132–146)

## 2023-02-10 PROCEDURE — 6370000000 HC RX 637 (ALT 250 FOR IP): Performed by: INTERNAL MEDICINE

## 2023-02-10 PROCEDURE — 6360000002 HC RX W HCPCS: Performed by: INTERNAL MEDICINE

## 2023-02-10 PROCEDURE — 85610 PROTHROMBIN TIME: CPT

## 2023-02-10 PROCEDURE — 6360000002 HC RX W HCPCS

## 2023-02-10 PROCEDURE — 83880 ASSAY OF NATRIURETIC PEPTIDE: CPT

## 2023-02-10 PROCEDURE — 80048 BASIC METABOLIC PNL TOTAL CA: CPT

## 2023-02-10 PROCEDURE — 36415 COLL VENOUS BLD VENIPUNCTURE: CPT

## 2023-02-10 PROCEDURE — 99232 SBSQ HOSP IP/OBS MODERATE 35: CPT | Performed by: INTERNAL MEDICINE

## 2023-02-10 PROCEDURE — 2060000000 HC ICU INTERMEDIATE R&B

## 2023-02-10 PROCEDURE — 99233 SBSQ HOSP IP/OBS HIGH 50: CPT | Performed by: INTERNAL MEDICINE

## 2023-02-10 PROCEDURE — 6370000000 HC RX 637 (ALT 250 FOR IP): Performed by: STUDENT IN AN ORGANIZED HEALTH CARE EDUCATION/TRAINING PROGRAM

## 2023-02-10 PROCEDURE — 2580000003 HC RX 258: Performed by: INTERNAL MEDICINE

## 2023-02-10 PROCEDURE — 85730 THROMBOPLASTIN TIME PARTIAL: CPT

## 2023-02-10 RX ORDER — BUMETANIDE 1 MG/1
2 TABLET ORAL DAILY
Status: DISCONTINUED | OUTPATIENT
Start: 2023-02-10 | End: 2023-02-11 | Stop reason: HOSPADM

## 2023-02-10 RX ORDER — OXYCODONE HYDROCHLORIDE AND ACETAMINOPHEN 5; 325 MG/1; MG/1
1 TABLET ORAL EVERY 6 HOURS PRN
Status: DISCONTINUED | OUTPATIENT
Start: 2023-02-10 | End: 2023-02-11 | Stop reason: HOSPADM

## 2023-02-10 RX ADMIN — SUCRALFATE 1 G: 1 TABLET ORAL at 10:56

## 2023-02-10 RX ADMIN — BUMETANIDE 2 MG: 1 TABLET ORAL at 12:12

## 2023-02-10 RX ADMIN — HYDROXYZINE PAMOATE 25 MG: 25 CAPSULE ORAL at 21:26

## 2023-02-10 RX ADMIN — SUCRALFATE 1 G: 1 TABLET ORAL at 06:30

## 2023-02-10 RX ADMIN — HEPARIN SODIUM 8 UNITS/KG/HR: 10000 INJECTION, SOLUTION INTRAVENOUS at 21:31

## 2023-02-10 RX ADMIN — OXYCODONE AND ACETAMINOPHEN 1 TABLET: 5; 325 TABLET ORAL at 21:25

## 2023-02-10 RX ADMIN — SUCRALFATE 1 G: 1 TABLET ORAL at 20:37

## 2023-02-10 RX ADMIN — Medication 2 MG: at 06:30

## 2023-02-10 RX ADMIN — OXYCODONE AND ACETAMINOPHEN 1 TABLET: 5; 325 TABLET ORAL at 15:08

## 2023-02-10 RX ADMIN — MORPHINE SULFATE 2 MG: 2 INJECTION, SOLUTION INTRAMUSCULAR; INTRAVENOUS at 02:30

## 2023-02-10 RX ADMIN — MORPHINE SULFATE 2 MG: 2 INJECTION, SOLUTION INTRAMUSCULAR; INTRAVENOUS at 10:57

## 2023-02-10 RX ADMIN — Medication 2 MG: at 02:30

## 2023-02-10 RX ADMIN — Medication 2 MG: at 11:18

## 2023-02-10 RX ADMIN — ASPIRIN 81 MG: 81 TABLET, COATED ORAL at 08:47

## 2023-02-10 RX ADMIN — PANTOPRAZOLE SODIUM 40 MG: 40 TABLET, DELAYED RELEASE ORAL at 16:53

## 2023-02-10 RX ADMIN — SUCRALFATE 1 G: 1 TABLET ORAL at 16:53

## 2023-02-10 RX ADMIN — SODIUM CHLORIDE, PRESERVATIVE FREE 10 ML: 5 INJECTION INTRAVENOUS at 02:30

## 2023-02-10 RX ADMIN — DULOXETINE HYDROCHLORIDE 30 MG: 30 CAPSULE, DELAYED RELEASE ORAL at 08:48

## 2023-02-10 RX ADMIN — SODIUM CHLORIDE, PRESERVATIVE FREE 10 ML: 5 INJECTION INTRAVENOUS at 10:57

## 2023-02-10 RX ADMIN — METOPROLOL SUCCINATE 25 MG: 25 TABLET, FILM COATED, EXTENDED RELEASE ORAL at 08:47

## 2023-02-10 RX ADMIN — AMLODIPINE BESYLATE 5 MG: 5 TABLET ORAL at 08:48

## 2023-02-10 RX ADMIN — Medication 2 MG: at 16:57

## 2023-02-10 RX ADMIN — MORPHINE SULFATE 2 MG: 2 INJECTION, SOLUTION INTRAMUSCULAR; INTRAVENOUS at 06:30

## 2023-02-10 RX ADMIN — SODIUM CHLORIDE, PRESERVATIVE FREE 10 ML: 5 INJECTION INTRAVENOUS at 20:36

## 2023-02-10 RX ADMIN — PANTOPRAZOLE SODIUM 40 MG: 40 TABLET, DELAYED RELEASE ORAL at 06:30

## 2023-02-10 RX ADMIN — SODIUM CHLORIDE, PRESERVATIVE FREE 10 ML: 5 INJECTION INTRAVENOUS at 06:30

## 2023-02-10 RX ADMIN — WARFARIN SODIUM 10 MG: 5 TABLET ORAL at 16:53

## 2023-02-10 ASSESSMENT — PAIN DESCRIPTION - ONSET
ONSET: ON-GOING
ONSET: ON-GOING

## 2023-02-10 ASSESSMENT — PAIN DESCRIPTION - FREQUENCY
FREQUENCY: CONTINUOUS
FREQUENCY: INTERMITTENT
FREQUENCY: CONTINUOUS

## 2023-02-10 ASSESSMENT — PAIN DESCRIPTION - PAIN TYPE
TYPE: ACUTE PAIN

## 2023-02-10 ASSESSMENT — PAIN SCALES - GENERAL
PAINLEVEL_OUTOF10: 5
PAINLEVEL_OUTOF10: 1
PAINLEVEL_OUTOF10: 6
PAINLEVEL_OUTOF10: 5
PAINLEVEL_OUTOF10: 1
PAINLEVEL_OUTOF10: 1
PAINLEVEL_OUTOF10: 6

## 2023-02-10 ASSESSMENT — PAIN DESCRIPTION - LOCATION
LOCATION: CHEST

## 2023-02-10 ASSESSMENT — PAIN - FUNCTIONAL ASSESSMENT
PAIN_FUNCTIONAL_ASSESSMENT: ACTIVITIES ARE NOT PREVENTED

## 2023-02-10 ASSESSMENT — PAIN DESCRIPTION - DESCRIPTORS
DESCRIPTORS: SHARP;SHOOTING;STABBING
DESCRIPTORS: SHARP;SHOOTING;STABBING
DESCRIPTORS: SHARP

## 2023-02-10 ASSESSMENT — PAIN DESCRIPTION - ORIENTATION: ORIENTATION: LEFT;RIGHT

## 2023-02-10 NOTE — PROGRESS NOTES
INPATIENT CARDIOLOGY FOLLOW-UP    Name: Brooklyn Crouch    Age: 65 y.o. Date of Admission: 2/6/2023  9:13 AM    Date of Service: 2/10/2023    Primary Cardiologist: Dr Jesi Anderson    Chief Complaint: Follow-up for chest pain    Interim History:  Still with shortness of breath with exertion, somewhat improved, and still with chest pain with deep breathing states morphine helps. INR remains subtherapeutic, he has been changed from enoxaparin to intravenous heparin.     Review of Systems:   Negative except as described above    Problem List:  Patient Active Problem List   Diagnosis    Chest pain    Closed fracture of nasal bones    Injury of face    Retroperitoneal hematoma    Vitamin D deficiency    Acute chest pain    Acute cholecystitis    Gastrointestinal hemorrhage    Polyp of colon    Right upper quadrant abdominal pain    Diarrhea    Current moderate episode of major depressive disorder without prior episode ([facility])    Hypertension    S/P MVR (mitral valve replacement)    S/P AVR (aortic valve replacement)    Coronary artery disease involving native coronary artery of native heart without angina pectoris    Paravalvular leak (prosthetic valve)    Hx of CABG    Warfarin anticoagulation    Subtherapeutic international normalized ratio (INR)    Class 2 severe obesity due to excess calories with serious comorbidity and body mass index (BMI) of 36.0 to 36.9 in adult (HCC)    Dyspnea       Current Medications:    Current Facility-Administered Medications:     bumetanide (BUMEX) tablet 2 mg, 2 mg, Oral, Daily, Jonas Phillip MD    warfarin (COUMADIN) tablet 10 mg, 10 mg, Oral, Daily, Annie Woods DO, 10 mg at 02/09/23 1728    heparin (porcine) injection 4,000 Units, 4,000 Units, IntraVENous, PRN, Annie Hardenon, DO    heparin (porcine) injection 2,000 Units, 2,000 Units, IntraVENous, PRN, Annie Woods DO    heparin 25,000 units in dextrose 5% 250 mL (premix) infusion, 5-30 Units/kg/hr, IntraVENous, Continuous, Annie HALL [place], DO, Last Rate: 9.3 mL/hr at 02/10/23 0631, 8 Units/kg/hr at 02/10/23 0631    morphine (PF) injection 2 mg, 2 mg, IntraVENous, Q4H PRN, Ankita Ingram, APRN - CNP, 2 mg at 02/10/23 1057    amLODIPine (NORVASC) tablet 5 mg, 5 mg, Oral, Daily, Annie HALL [place], DO, 5 mg at 02/10/23 0848    aspirin EC tablet 81 mg, 81 mg, Oral, Daily, Annie HALL [place], DO, 81 mg at 02/10/23 0847    DULoxetine (CYMBALTA) extended release capsule 30 mg, 30 mg, Oral, Daily, Annie Hardenon, DO, 30 mg at 02/10/23 0848    metoprolol succinate (TOPROL XL) extended release tablet 25 mg, 25 mg, Oral, Daily, Annie Hardenon, DO, 25 mg at 02/10/23 0847    nicotine polacrilex (COMMIT) lozenge 2 mg, 2 mg, Oral, Q2H PRN, Annie Hardenon, DO, 2 mg at 02/10/23 1118    pantoprazole (PROTONIX) tablet 40 mg, 40 mg, Oral, BID AC, Annie HALL [place], DO, 40 mg at 02/10/23 0630    sucralfate (CARAFATE) tablet 1 g, 1 g, Oral, 4x Daily AC & HS, Annie HALL [place], DO, 1 g at 02/10/23 1056    sodium chloride flush 0.9 % injection 5-40 mL, 5-40 mL, IntraVENous, 2 times per day, Annie HALL [place], DO, 10 mL at 02/08/23 2129    sodium chloride flush 0.9 % injection 5-40 mL, 5-40 mL, IntraVENous, PRN, Annie HALL [place], DO, 10 mL at 02/10/23 1057    0.9 % sodium chloride infusion, , IntraVENous, PRN, Annie HALL [place], DO    ondansetron (ZOFRAN-ODT) disintegrating tablet 4 mg, 4 mg, Oral, Q8H PRN **OR** ondansetron (ZOFRAN) injection 4 mg, 4 mg, IntraVENous, Q6H PRN, Annie Woods DO    polyethylene glycol (GLYCOLAX) packet 17 g, 17 g, Oral, Daily PRN, Annie Woods DO    acetaminophen (TYLENOL) tablet 650 mg, 650 mg, Oral, Q6H PRN, 650 mg at 02/06/23 2234 **OR** acetaminophen (TYLENOL) suppository 650 mg, 650 mg, Rectal, Q6H PRN, Annie Woods DO    nitroGLYCERIN (NITROSTAT) SL tablet 0.4 mg, 0.4 mg, SubLINGual, Q5 Min PRN, Annie Woods DO, 0.4 mg at 02/06/23 2234    melatonin tablet 3 mg, 3 mg, Oral, Nightly PRN, Ria Dill MD, 3 mg at 02/08/23 2119    hydrOXYzine pamoate (VISTARIL) capsule 25 mg, 25 mg, Oral, TID PRN, Kamar Dawkins MD    Physical Exam:  BP (!) 129/95   Pulse 86   Temp 98.1 °F (36.7 °C) (Oral)   Resp 20   Ht 5' 10\" (1.778 m)   Wt 254 lb 11.2 oz (115.5 kg)   SpO2 96%   BMI 36.55 kg/m²   Wt Readings from Last 3 Encounters:   02/09/23 254 lb 11.2 oz (115.5 kg)   09/15/22 250 lb (113.4 kg)   09/07/22 254 lb (115.2 kg)     Appearance: Obese, awake, alert, no acute respiratory distress  Skin: Intact, no rash  Head: Normocephalic, atraumatic  Eyes: EOMI, no conjunctival erythema  ENMT: No pharyngeal erythema, MMM, no rhinorrhea  Neck: Supple, no elevated JVP, no carotid bruits  Lungs: Clear to auscultation bilaterally. No wheezes, rales, or rhonchi. Cardiac: PMI nondisplaced, Regular rhythm with a normal rate, mechanical crisp sounding S1 & S2 normal, no murmurs. Right chest pacemaker. Chest wall tender to palpation reproducing chest pain symptoms  Abdomen: Soft, nontender, +bowel sounds  Extremities: Moves all extremities x 4, no lower extremity edema  Neurologic: No focal motor deficits apparent, normal mood and affect  Peripheral Pulses: Intact posterior tibial pulses bilaterally    Intake/Output:    Intake/Output Summary (Last 24 hours) at 2/10/2023 1145  Last data filed at 2/10/2023 0631  Gross per 24 hour   Intake 626.83 ml   Output 3000 ml   Net -2373.17 ml     No intake/output data recorded. Laboratory Tests:  Recent Labs     02/08/23  0238 02/09/23  0255 02/10/23  0918    137 136   K 3.7 4.0 3.7    103 102   CO2 24 23 22   BUN 14 17 22*   CREATININE 1.6* 1.7* 1.8*   GLUCOSE 103* 99 107*   CALCIUM 8.5* 8.4* 9.0     Lab Results   Component Value Date/Time    MG 1.7 08/25/2022 04:44 AM     No results for input(s): ALKPHOS, ALT, AST, PROT, BILITOT, BILIDIR, LABALBU in the last 72 hours.     Recent Labs     02/08/23  0236 02/08/23  1438 02/09/23  0255   WBC 8.0 7.7 8.0   RBC 4.23 3.86 3.92   HGB 12.2* 11.5* 11.6*   HCT 39.0 36.6* 36.7*   MCV 92.2 94.8 93.6   MCH 28.8 29.8 29.6   MCHC 31.3* 31.4* 31.6*   RDW 15.7* 15.9* 15.9*    137 134   MPV 11.8 12.4* 12.4*     No results found for: CKTOTAL, CKMB, CKMBINDEX, TROPONINI  Lab Results   Component Value Date    INR 1.7 02/10/2023    INR 1.5 2023    INR 1.6 2023    PROTIME 19.2 (H) 02/10/2023    PROTIME 16.8 (H) 2023    PROTIME 17.7 (H) 2023     Lab Results   Component Value Date    TSH 2.590 2023     Lab Results   Component Value Date    LABA1C 4.9 2023     No results found for: EAG  Lab Results   Component Value Date    CHOL 134 2023    CHOL 86 2022     Lab Results   Component Value Date    TRIG 146 2023    TRIG 149 2022     Lab Results   Component Value Date    HDL 33 2023    HDL 28 2022     Lab Results   Component Value Date    LDLCALC 72 2023    LDLCALC 28 2022     Lab Results   Component Value Date    LABVLDL 29 2023    LABVLDL 30 2022     No results found for: CHOLHDLRATIO  Recent Labs     02/10/23  0918   PROBNP 456*         Cardiac Tests:    EK2023: Sinus tachycardia 104 bpm.  RBBB/LAFB QRS duration 160 ms    Telemetry: Sinus rhythm 80s    Chest X-ray:   23  Personally reviewed. Right-sided dual-chamber pacemaker. Sternotomy wires. Mechanical aortic and mitral valve    Impression   Postsurgical changes. Enlarged cardiomediastinal silhouette. Low lung   volumes and elevated right hemidiaphragm. Echocardiogram:   BEATRIZ 23   Summary   29 mm [employer] Jose mechanical mitral prosthesis. Leaflets well visualized and have normal motion. No evidence of mitral valve stenosis. Mild-moderate paravalvular regurgitation through perforation of mitral   annulus at the anterolateral aspect of the annulus. 23 mm [employer] Jose mechanical aortic valve prosthesis, not well visualized.     TTE 23   Summary   Micro-bubble contrast injected to enhance left ventricular visualization. Normal left ventricular size and systolic function. Ejection fraction is visually estimated at 55%. Indeterminate diastolic function. No regional wall motion abnormalities seen. Mild left ventricular concentric hypertrophy noted. Mildly dilated right ventricle with normal function. Biatrial dilation. 29 mm [employer] Jose mechanical mitral prosthesis present. Conflicting Doppler parameters, some suggestive of prosthetic valve   stenosis. 23 mm [employer] Jose mechanical aortic valve prosthesis present and functioning normally. 11/5/2020 [facility]TE [facility]: EF 55-60%. Abnormal septal motion consistent with post-operative status. #29 [employer] Jose bileaflet mechanical valve. MV gradient 5.4 mmHg and trival prosthetic MR. Mildly elevated RVSP (35.3 mmHg). Mild to moderate TR. #23 St Jose's bileaflet mechanical aortic valve with gradients 21.9/12.3 mmHg and DI of 0.53 and trivial AI. When compared to previous TTE AV gradients are similar. No MV gradients were obtained on previous TTE. Stress test:      Cardiac catheterization:  9/11/2020 [facility] JOSE LAD mid vessel 95% stenosis. LCx posterolateral extension 70% stenosis     Device interrogation 2/6/2023  [employer]tronic  12.1 years battery life remaining  DDDR LRL 60 bpm  0.4% ventricular pacing  1.0% atrial pacing  Less than 0.1% AT/AF burden  12 NSVT    ----------------------------------------------------------------------------------------------------------------------------------------------------------------  IMPRESSION:  Atypical chest pain. Noncardiac musculoskeletal  Mechanical [place] AVR and MVR 2020  for severe MR/AR, noncompliant with warfarin with subtherapeutic INR 1.8->1.5. 1.7  Moderate paravalvular MR outside sewing ring due to annular perforation noted on BEATRIZ 2/8/2023  CAD, single-vessel CABG LIMA-LAD at time of valve surgery  Postoperative complete heart block s/p dual-chamber PPM [employer]tronic  LBBB/LAFB  Acute on chronic HFpEF.   Does not appear particularly hypervolemic but diuresed well.  proBNP 246-456. Net -4.9 L  Chronically elevated hs-cTnT  Dyspnea likely multifactorial  FRANTZ/CKD creatinine baseline 1.4-1.6, 1.8 today  Hypokalemia, improved  Mildly elevated LFTs  Hypertension  Hyperlipidemia  History of CVA  History of testicular cancer  Tobacco abuse  Alcohol abuse  Obesity    RECOMMENDATIONS:  BEATRIZ as above showing normal prosthetic valve function, however evidence of mitral annular perforation and moderate paravalvular regurgitation which does not appear severe, not sure if this may be contributing to his dyspnea. Responded well to diuretics with slight increase in creatinine. May need to tolerate higher baseline creatinine to improve dyspnea symptoms  Start bumetanide 2 mg p.o. daily  Recommend outpatient evaluation at [facility] for percutaneous repair of paravalvular leak  Continue warfarin, goal INR 3.0 (2.5-3.5) still subtherapeutic and INR trending down every day now on 10 mg daily  Caution with higher dose warfarin, had supratherapeutic INR in the past (>10)  On IV heparin, however from my standpoint could be discharged home with therapeutic enoxaparin until INR therapeutic  As his creatinine clearance is greater than 50 mL/min, no dosage adjustment with enoxaparin would be required  Continue aspirin, statin, metoprolol succinate, amlodipine  Maintain electrolyte replacement  Reiterated importance of compliance with anticoagulation  Outpatient sleep study if he has never had one  Aggressive risk factor modification including smoking cessation and weight loss recommended  Trial NSAIDs for musculoskeletal chest pain  Further care per primary service  Okay for discharge from cardiology standpoint  Outpatient follow-up with Dr Melida Kauffman cardiology/CTS  Will be available as needed, please call with questions    Efra James MD, [facility] Cardiology    NOTE: This report was transcribed using voice recognition software.  Every effort was made to ensure accuracy; however, inadvertent computerized transcription errors may be present.

## 2023-02-10 NOTE — PROGRESS NOTES
[facility] Progress Note    Admitting Date and Time: 2/6/2023  9:13 AM  Admit Dx: Chest pain [R07.9]    Subjective:  Patient is being followed for Chest pain [R07.9]     States that he feels well.      ROS: denies fever, chills, cp, sob, n/v, HA unless stated above.      warfarin  10 mg Oral Daily    amLODIPine  5 mg Oral Daily    aspirin  81 mg Oral Daily    DULoxetine  30 mg Oral Daily    metoprolol succinate  25 mg Oral Daily    pantoprazole  40 mg Oral BID AC    sucralfate  1 g Oral 4x Daily AC & HS    sodium chloride flush  5-40 mL IntraVENous 2 times per day     heparin (porcine), 4,000 Units, PRN  heparin (porcine), 2,000 Units, PRN  morphine, 2 mg, Q4H PRN  nicotine polacrilex, 2 mg, Q2H PRN  sodium chloride flush, 5-40 mL, PRN  sodium chloride, , PRN  ondansetron, 4 mg, Q8H PRN   Or  ondansetron, 4 mg, Q6H PRN  polyethylene glycol, 17 g, Daily PRN  acetaminophen, 650 mg, Q6H PRN   Or  acetaminophen, 650 mg, Q6H PRN  nitroGLYCERIN, 0.4 mg, Q5 Min PRN  melatonin, 3 mg, Nightly PRN  hydrOXYzine pamoate, 25 mg, TID PRN         Objective:    BP (!) 129/95   Pulse 86   Temp 98.1 °F (36.7 °C) (Oral)   Resp 20   Ht 5' 10\" (1.778 m)   Wt 254 lb 11.2 oz (115.5 kg)   SpO2 100%   BMI 36.55 kg/m²     General Appearance: alert and oriented to person, place and time and in no acute distress  Skin: warm and dry  Head: normocephalic and atraumatic  Eyes: pupils equal, round, and reactive to light, extraocular eye movements intact, conjunctivae normal  Neck: neck supple and non tender without mass   Pulmonary/Chest: clear to auscultation bilaterally- no wheezes, rales or rhonchi, normal air movement, no respiratory distress  Cardiovascular: normal rate, normal S1 and S2 and no carotid bruits  Abdomen: soft, non-tender, non-distended, normal bowel sounds, no masses or organomegaly  Extremities: no cyanosis, no clubbing and no edema  Neurologic: no cranial nerve deficit and speech normal        Recent Labs     02/07/23  1015 02/08/23  0238 02/09/23  0255    136 137   K 3.9 3.7 4.0    102 103   CO2 22 24 23   BUN 12 14 17   CREATININE 1.6* 1.6* 1.7*   GLUCOSE 106* 103* 99   CALCIUM 8.6 8.5* 8.4*       Recent Labs     02/08/23  0236 02/08/23  1438 02/09/23  0255   WBC 8.0 7.7 8.0   RBC 4.23 3.86 3.92   HGB 12.2* 11.5* 11.6*   HCT 39.0 36.6* 36.7*   MCV 92.2 94.8 93.6   MCH 28.8 29.8 29.6   MCHC 31.3* 31.4* 31.6*   RDW 15.7* 15.9* 15.9*    137 134   MPV 11.8 12.4* 12.4*       Assessment:    Principal Problem:    Chest pain  Active Problems:    S/P MVR (mitral valve replacement)    S/P AVR (aortic valve replacement)    Coronary artery disease involving native coronary artery of native heart without angina pectoris    Paravalvular leak (prosthetic valve)    Hx of CABG    Warfarin anticoagulation    Subtherapeutic international normalized ratio (INR)    Class 2 severe obesity due to excess calories with serious comorbidity and body mass index (BMI) of 36.0 to 36.9 in [facility])    Dyspnea  Resolved Problems:    * No resolved hospital problems. *      Plan:    Acute atypical chest pain - Chest pain reproducible upon palpation. Continue nitro PRN, asa, and toprol. A1c, lipid panel, and TSH are unremarkable. Cardiology consult appreciated  H/O prosthetic aortic and mitral valve replacement - Goal INR is 2.5 to 3.5. BEATRIZ indicates that there is mild to moderate paravalvular MR. As per cardiology, he needs outpatient evaluation of the leak   CKD III - Baseline appears to be between 1.4 to 1.6  Subtherapeutic INR - Continue heparin and coumadin 10. H/O CVA  H/O Testicular cancer  H/O alcoholic liver disease  DVT prophylaxis - Coumadin, Heparin      NOTE: This report was transcribed using voice recognition software. Every effort was made to ensure accuracy; however, inadvertent computerized transcription errors may be present.     Electronically signed by Mary Aden DO on 2/10/2023 at 8:36 AM

## 2023-02-10 NOTE — CARE COORDINATION
Social Work/Discharge Planning:  Plan remains home at discharge. Patient requesting a script for his warfarin and nicotine lozenges at discharge.   Electronically signed by ANJEL May on 2/10/2023 at 12:37 PM

## 2023-02-11 VITALS
WEIGHT: 252.1 LBS | BODY MASS INDEX: 36.09 KG/M2 | DIASTOLIC BLOOD PRESSURE: 90 MMHG | OXYGEN SATURATION: 100 % | HEIGHT: 70 IN | SYSTOLIC BLOOD PRESSURE: 133 MMHG | RESPIRATION RATE: 18 BRPM | HEART RATE: 69 BPM | TEMPERATURE: 97.4 F

## 2023-02-11 LAB
APTT: 76.8 SEC (ref 24.5–35.1)
INR BLD: 3.6
PROTHROMBIN TIME: 40.1 SEC (ref 9.3–12.4)

## 2023-02-11 PROCEDURE — 6370000000 HC RX 637 (ALT 250 FOR IP): Performed by: INTERNAL MEDICINE

## 2023-02-11 PROCEDURE — 85730 THROMBOPLASTIN TIME PARTIAL: CPT

## 2023-02-11 PROCEDURE — 99239 HOSP IP/OBS DSCHRG MGMT >30: CPT | Performed by: INTERNAL MEDICINE

## 2023-02-11 PROCEDURE — 85610 PROTHROMBIN TIME: CPT

## 2023-02-11 PROCEDURE — 36415 COLL VENOUS BLD VENIPUNCTURE: CPT

## 2023-02-11 PROCEDURE — 2580000003 HC RX 258: Performed by: INTERNAL MEDICINE

## 2023-02-11 RX ORDER — ASPIRIN 81 MG/1
81 TABLET ORAL DAILY
Qty: 30 TABLET | Refills: 3 | Status: SHIPPED | OUTPATIENT
Start: 2023-02-11

## 2023-02-11 RX ORDER — PANTOPRAZOLE SODIUM 40 MG/1
40 TABLET, DELAYED RELEASE ORAL
Qty: 30 TABLET | Refills: 3 | Status: SHIPPED | OUTPATIENT
Start: 2023-02-11

## 2023-02-11 RX ORDER — BUMETANIDE 2 MG/1
2 TABLET ORAL DAILY
Qty: 30 TABLET | Refills: 3 | Status: SHIPPED | OUTPATIENT
Start: 2023-02-11

## 2023-02-11 RX ORDER — AMLODIPINE BESYLATE 5 MG/1
5 TABLET ORAL DAILY
Qty: 30 TABLET | Refills: 3 | Status: SHIPPED | OUTPATIENT
Start: 2023-02-11

## 2023-02-11 RX ORDER — METOPROLOL SUCCINATE 25 MG/1
25 TABLET, EXTENDED RELEASE ORAL DAILY
Qty: 30 TABLET | Refills: 3 | Status: SHIPPED | OUTPATIENT
Start: 2023-02-11

## 2023-02-11 RX ORDER — WARFARIN SODIUM 5 MG/1
5 TABLET ORAL DAILY
Qty: 30 TABLET | Refills: 3 | Status: SHIPPED | OUTPATIENT
Start: 2023-02-11

## 2023-02-11 RX ORDER — DULOXETIN HYDROCHLORIDE 30 MG/1
30 CAPSULE, DELAYED RELEASE ORAL DAILY
Qty: 30 CAPSULE | Refills: 3 | Status: SHIPPED | OUTPATIENT
Start: 2023-02-11

## 2023-02-11 RX ORDER — SUCRALFATE 1 G/1
1 TABLET ORAL
Qty: 120 TABLET | Refills: 3 | Status: SHIPPED | OUTPATIENT
Start: 2023-02-11

## 2023-02-11 RX ORDER — NITROGLYCERIN 0.4 MG/1
0.4 TABLET SUBLINGUAL EVERY 5 MIN PRN
Qty: 25 TABLET | Refills: 3 | Status: SHIPPED | OUTPATIENT
Start: 2023-02-11

## 2023-02-11 RX ADMIN — SUCRALFATE 1 G: 1 TABLET ORAL at 05:44

## 2023-02-11 RX ADMIN — PANTOPRAZOLE SODIUM 40 MG: 40 TABLET, DELAYED RELEASE ORAL at 05:44

## 2023-02-11 RX ADMIN — BUMETANIDE 2 MG: 1 TABLET ORAL at 09:33

## 2023-02-11 RX ADMIN — SUCRALFATE 1 G: 1 TABLET ORAL at 09:33

## 2023-02-11 RX ADMIN — SODIUM CHLORIDE, PRESERVATIVE FREE 10 ML: 5 INJECTION INTRAVENOUS at 09:34

## 2023-02-11 RX ADMIN — ASPIRIN 81 MG: 81 TABLET, COATED ORAL at 09:33

## 2023-02-11 RX ADMIN — AMLODIPINE BESYLATE 5 MG: 5 TABLET ORAL at 09:33

## 2023-02-11 RX ADMIN — DULOXETINE HYDROCHLORIDE 30 MG: 30 CAPSULE, DELAYED RELEASE ORAL at 09:33

## 2023-02-11 RX ADMIN — Medication 2 MG: at 02:59

## 2023-02-11 RX ADMIN — METOPROLOL SUCCINATE 25 MG: 25 TABLET, FILM COATED, EXTENDED RELEASE ORAL at 09:33

## 2023-02-11 RX ADMIN — OXYCODONE AND ACETAMINOPHEN 1 TABLET: 5; 325 TABLET ORAL at 05:44

## 2023-02-11 ASSESSMENT — PAIN SCALES - GENERAL
PAINLEVEL_OUTOF10: 7
PAINLEVEL_OUTOF10: 7

## 2023-02-11 ASSESSMENT — PAIN DESCRIPTION - LOCATION
LOCATION: CHEST
LOCATION: CHEST

## 2023-02-11 ASSESSMENT — PAIN DESCRIPTION - DESCRIPTORS
DESCRIPTORS: SHARP
DESCRIPTORS: SHARP

## 2023-02-11 NOTE — DISCHARGE INSTRUCTIONS
Hold coumadin on 2/11. Resume normal home dose on 2/12. Check daily INR with results to be forwarded to PCP to adjust coumadin dose. Goal INR should be between 2.5 to 3.5.

## 2023-02-11 NOTE — DISCHARGE SUMMARY
[facility] Physician Discharge Summary       No follow-up provider specified. Activity level: As tolerated     Dispo: Home      Condition on discharge: Stable     Patient ID:  Harpreet Thurman  [id number]  65 y.o.  1965    Admit date: 2/6/2023    Discharge date and time:  2/11/2023  11:15 AM    Admission Diagnoses: Principal Problem:    Chest pain  Active Problems:    S/P MVR (mitral valve replacement)    S/P AVR (aortic valve replacement)    Coronary artery disease involving native coronary artery of native heart without angina pectoris    Paravalvular leak (prosthetic valve)    Hx of CABG    Warfarin anticoagulation    Subtherapeutic international normalized ratio (INR)    Class 2 severe obesity due to excess calories with serious comorbidity and body mass index (BMI) of 36.0 to 36.9 in [facility])    Dyspnea  Resolved Problems:    * No resolved hospital problems. *      Discharge Diagnoses: Principal Problem:    Chest pain  Active Problems:    S/P MVR (mitral valve replacement)    S/P AVR (aortic valve replacement)    Coronary artery disease involving native coronary artery of native heart without angina pectoris    Paravalvular leak (prosthetic valve)    Hx of CABG    Warfarin anticoagulation    Subtherapeutic international normalized ratio (INR)    Class 2 severe obesity due to excess calories with serious comorbidity and body mass index (BMI) of 36.0 to 36.9 in [facility])    Dyspnea  Resolved Problems:    * No resolved hospital problems. *      Consults:  IP CONSULT TO CARDIOLOGY    Procedures: None    Hospital Course:   Patient Harpreet Thurman is a 65 y.o. presented with Chest pain [R19]    65year old male presented with chest pain and shortness of breath. ACS was ruled out. Patient was seen by cardiology and BEATRIZ done as he has had MV and AV replacement. He does have MR and will need to follow up with his surgeon in [facility]. INR was intially subtherapeutic.  He has been started on heparin and coumdin. INR is now slightly supratherapeutic. Will hold coumadin today and have daily INR check for 5 days and have results sent to PCP to adjust coumadin for goal INR of 2.5 to 3.5. This was explained to patient and he understood. Discharge Exam:    General Appearance: alert and oriented to person, place and time and in no acute distress  Skin: warm and dry  Head: normocephalic and atraumatic  Eyes: pupils equal, round, and reactive to light, extraocular eye movements intact, conjunctivae normal  Neck: neck supple and non tender without mass   Pulmonary/Chest: clear to auscultation bilaterally- no wheezes, rales or rhonchi, normal air movement, no respiratory distress  Cardiovascular: normal rate, normal S1 and S2 and no carotid bruits  Abdomen: soft, non-tender, non-distended, normal bowel sounds, no masses or organomegaly  Extremities: no cyanosis, no clubbing and no edema  Neurologic: no cranial nerve deficit and speech normal    I/O last 3 completed shifts: In: 963.7 [P.O.:600; I.V.:363.7]  Out: 5700 [Urine:5700]  No intake/output data recorded. LABS:  Recent Labs     02/09/23  0255 02/10/23  0918    136   K 4.0 3.7    102   CO2 23 22   BUN 17 22*   CREATININE 1.7* 1.8*   GLUCOSE 99 107*   CALCIUM 8.4* 9.0       Recent Labs     02/08/23  1438 02/09/23  0255   WBC 7.7 8.0   RBC 3.86 3.92   HGB 11.5* 11.6*   HCT 36.6* 36.7*   MCV 94.8 93.6   MCH 29.8 29.6   MCHC 31.4* 31.6*   RDW 15.9* 15.9*    134   MPV 12.4* 12.4*       No results for input(s): POCGLU in the last 72 hours. Imaging:  XR CHEST PORTABLE    Result Date: 2/6/2023  EXAMINATION: ONE XRAY VIEW OF THE CHEST 2/6/2023 10:26 am COMPARISON: 08/22/2022. HISTORY: ORDERING SYSTEM PROVIDED HISTORY: chest pain TECHNOLOGIST PROVIDED HISTORY: Reason for exam:->chest pain FINDINGS: The patient is status post sternotomy. There is a pacemaker/defibrillator on the right.   The cardiomediastinal silhouette is enlarged. Lung volumes are low. The right hemidiaphragm is elevated. No pneumothorax or definite pleural effusion is seen. Postsurgical changes. Enlarged cardiomediastinal silhouette. Low lung volumes and elevated right hemidiaphragm. CTA PULMONARY W CONTRAST    Result Date: 2/6/2023  EXAMINATION: CTA OF THE CHEST 2/6/2023 11:26 am TECHNIQUE: CTA of the chest was performed after the administration of intravenous contrast.  Multiplanar reformatted images are provided for review. MIP images are provided for review. Automated exposure control, iterative reconstruction, and/or weight based adjustment of the mA/kV was utilized to reduce the radiation dose to as low as reasonably achievable. COMPARISON: None. HISTORY: ORDERING SYSTEM PROVIDED HISTORY: rule out PE - history of DVT, tachycardic and tachypneic TECHNOLOGIST PROVIDED HISTORY: Reason for exam:->rule out PE - history of DVT, tachycardic and tachypneic Decision Support Exception - unselect if not a suspected or confirmed emergency medical condition->Emergency Medical Condition (MA) FINDINGS: Pulmonary Arteries: Limited evaluation due to prominent respiratory motion artifact and suboptimal pulmonary arterial enhancement. Within this limitation, no central embolus is seen. The segmental and subsegmental arteries are not well evaluated. Mediastinum: The heart is normal in size. Prosthetic aortic and mitral valves noted. Pacemaker in situ. No pericardial effusion. Minimal atherosclerosis in the thoracic aorta which is mildly tortuous but not aneurysmal.  No lymphadenopathy seen in the chest.  Small amount of contrast in the distal esophagus Lungs/pleura: The lungs are clear. The central airway is clear. No pneumothorax or pleural effusion is seen. Upper Abdomen: Cholelithiasis is noted. Soft Tissues/Bones: No acute fracture. Old healed right scapular fracture.      1. Limited CT pulmonary angiogram due to prominent respiratory motion artifact and suboptimal arterial enhancement. 2. Within this limitation, no central embolus seen. 3. Clear lungs. 4. Cholelithiasis. RECOMMENDATIONS: Unavailable       Patient Instructions:      Medication List        START taking these medications      bumetanide 2 MG tablet  Commonly known as: BUMEX  Take 1 tablet by mouth daily     nitroGLYCERIN 0.4 MG SL tablet  Commonly known as: NITROSTAT  Place 1 tablet under the tongue every 5 minutes as needed for Chest pain up to max of 3 total doses. If no relief after 1 dose, call 911. CHANGE how you take these medications      amLODIPine 5 MG tablet  Commonly known as: NORVASC  Take 1 tablet by mouth daily  What changed: See the new instructions. sucralfate 1 GM tablet  Commonly known as: CARAFATE  Take 1 tablet by mouth 4 times daily (before meals and nightly)  What changed: when to take this     * warfarin 2.5 MG tablet  Commonly known as: COUMADIN  What changed: Another medication with the same name was added. Make sure you understand how and when to take each. * warfarin 5 MG tablet  Commonly known as: Coumadin  Take 1 tablet by mouth daily  What changed: You were already taking a medication with the same name, and this prescription was added. Make sure you understand how and when to take each. * This list has 2 medication(s) that are the same as other medications prescribed for you. Read the directions carefully, and ask your doctor or other care provider to review them with you.                 CONTINUE taking these medications      aspirin 81 MG EC tablet  Take 1 tablet by mouth daily     Crestor 10 MG tablet  Generic drug: rosuvastatin     DULoxetine 30 MG extended release capsule  Commonly known as: CYMBALTA  Take 1 capsule by mouth daily     metoprolol succinate 25 MG extended release tablet  Commonly known as: TOPROL XL  Take 1 tablet by mouth daily     nicotine polacrilex 2 MG lozenge  Commonly known as: COMMIT  Take 1 lozenge by mouth every 2 hours as needed for Smoking cessation     pantoprazole 40 MG tablet  Commonly known as: PROTONIX  Take 1 tablet by mouth 2 times daily (before meals)     sertraline 50 MG tablet  Commonly known as: ZOLOFT     tamsulosin 0.4 MG capsule  Commonly known as: FLOMAX               Where to Get Your Medications        You can get these medications from any pharmacy    Bring a paper prescription for each of these medications  amLODIPine 5 MG tablet  aspirin 81 MG EC tablet  bumetanide 2 MG tablet  DULoxetine 30 MG extended release capsule  metoprolol succinate 25 MG extended release tablet  nitroGLYCERIN 0.4 MG SL tablet  pantoprazole 40 MG tablet  sucralfate 1 GM tablet  warfarin 5 MG tablet           Note that 37 minutes was spent in preparing discharge papers, discussing discharge with patient, medication review, etc.    Signed:  Electronically signed by Daniele Rosario DO on 2/11/2023 at 11:15 AM

## 2023-03-21 NOTE — CARE COORDINATION
Social Work/Discharge Planning:  Met with patient and completed initial assessment. Explained Social Work role and discussed transition of care/discharge planning. Patient lives with his girlfriend in a two story house. PTA he is independent with no adaptive device. He has no durable medical equipment. He has a history with [facility] but can not recall name of company. His PCP is Dr. Josette Trevizo and pharmacy is [facility] on [address]. He had an echo today. Patient states he does not have enough warfarin for the month, since his PCP wrote order for only five days a week instead of daily. Suggested for patient to contact his PCP. He states he left a message today to confirm if they can assist.  Patient is requesting for hospital Physician to write order at discharge. He plans to return home at discharge. Will continue to follow and assist with discharge planning.   Electronically signed by ANJEL Joseph on 2/7/2023 at 2:32 PM Curettage Text: The wound bed was treated with curettage after the biopsy was performed.

## 2023-04-03 ENCOUNTER — TELEPHONE (OUTPATIENT)
Dept: PRIMARY CARE CLINIC | Age: 58
End: 2023-04-03

## 2023-04-21 ENCOUNTER — HOSPITAL ENCOUNTER (OUTPATIENT)
Dept: CT IMAGING | Age: 58
End: 2023-04-21
Payer: COMMERCIAL

## 2023-04-21 DIAGNOSIS — R41.0 CONFUSION: ICD-10-CM

## 2023-04-21 PROCEDURE — 70450 CT HEAD/BRAIN W/O DYE: CPT

## 2023-05-23 ENCOUNTER — HOSPITAL ENCOUNTER (OUTPATIENT)
Age: 58
Discharge: HOME | End: 2023-05-25
Payer: COMMERCIAL

## 2023-05-23 ENCOUNTER — HOSPITAL ENCOUNTER (OUTPATIENT)
Dept: GENERAL RADIOLOGY | Age: 58
Discharge: HOME | End: 2023-05-25
Payer: COMMERCIAL

## 2023-05-23 ENCOUNTER — OFFICE VISIT (OUTPATIENT)
Dept: CARDIOLOGY CLINIC | Age: 58
End: 2023-05-23
Payer: COMMERCIAL

## 2023-05-23 VITALS
BODY MASS INDEX: 31.12 KG/M2 | SYSTOLIC BLOOD PRESSURE: 106 MMHG | RESPIRATION RATE: 16 BRPM | WEIGHT: 217.4 LBS | HEART RATE: 99 BPM | DIASTOLIC BLOOD PRESSURE: 64 MMHG | HEIGHT: 70 IN | OXYGEN SATURATION: 99 %

## 2023-05-23 DIAGNOSIS — N18.2 CKD (CHRONIC KIDNEY DISEASE), STAGE II: Primary | ICD-10-CM

## 2023-05-23 DIAGNOSIS — R07.9 CHEST PAIN, UNSPECIFIED TYPE: Primary | ICD-10-CM

## 2023-05-23 DIAGNOSIS — R05.1 ACUTE COUGH: ICD-10-CM

## 2023-05-23 PROCEDURE — 3017F COLORECTAL CA SCREEN DOC REV: CPT | Performed by: INTERNAL MEDICINE

## 2023-05-23 PROCEDURE — 93000 ELECTROCARDIOGRAM COMPLETE: CPT | Performed by: INTERNAL MEDICINE

## 2023-05-23 PROCEDURE — G8427 DOCREV CUR MEDS BY ELIG CLIN: HCPCS | Performed by: INTERNAL MEDICINE

## 2023-05-23 PROCEDURE — 71046 X-RAY EXAM CHEST 2 VIEWS: CPT

## 2023-05-23 PROCEDURE — 3074F SYST BP LT 130 MM HG: CPT | Performed by: INTERNAL MEDICINE

## 2023-05-23 PROCEDURE — 99214 OFFICE O/P EST MOD 30 MIN: CPT | Performed by: INTERNAL MEDICINE

## 2023-05-23 PROCEDURE — 4004F PT TOBACCO SCREEN RCVD TLK: CPT | Performed by: INTERNAL MEDICINE

## 2023-05-23 PROCEDURE — 3078F DIAST BP <80 MM HG: CPT | Performed by: INTERNAL MEDICINE

## 2023-05-23 PROCEDURE — G8417 CALC BMI ABV UP PARAM F/U: HCPCS | Performed by: INTERNAL MEDICINE

## 2023-05-23 RX ORDER — ESOMEPRAZOLE MAGNESIUM 40 MG/1
40 CAPSULE, DELAYED RELEASE ORAL
COMMUNITY

## 2023-05-23 RX ORDER — FOLIC ACID 1 MG/1
1 TABLET ORAL DAILY
COMMUNITY
Start: 2023-04-04

## 2023-05-23 RX ORDER — MIRTAZAPINE 15 MG/1
TABLET, FILM COATED ORAL
COMMUNITY
Start: 2023-05-03

## 2023-05-23 RX ORDER — HALOPERIDOL 5 MG/1
5 TABLET ORAL NIGHTLY
Qty: 22 TABLET | Refills: 0 | COMMUNITY
Start: 2023-05-03 | End: 2023-05-25

## 2023-05-23 RX ORDER — LEVETIRACETAM 250 MG/1
250 TABLET ORAL DAILY
COMMUNITY
Start: 2023-05-04

## 2023-05-23 RX ORDER — LANOLIN ALCOHOL/MO/W.PET/CERES
CREAM (GRAM) TOPICAL
COMMUNITY
Start: 2023-04-04

## 2023-05-23 RX ORDER — RIVASTIGMINE 9.5 MG/24H
9.5 PATCH, EXTENDED RELEASE TRANSDERMAL DAILY
COMMUNITY
Start: 2023-05-04 | End: 2023-05-24

## 2023-05-23 RX ORDER — VALPROIC ACID 250 MG/1
CAPSULE, LIQUID FILLED ORAL EVERY 8 HOURS
COMMUNITY
Start: 2023-04-04

## 2023-05-23 RX ORDER — ACETAMINOPHEN 325 MG/1
650 TABLET ORAL EVERY 6 HOURS PRN
COMMUNITY
Start: 2023-03-05

## 2023-05-23 ASSESSMENT — ENCOUNTER SYMPTOMS
BACK PAIN: 0
ABDOMINAL PAIN: 0
VOMITING: 0
COUGH: 0
NAUSEA: 0
WHEEZING: 0
DIARRHEA: 0
BLOOD IN STOOL: 0
CONSTIPATION: 0
SHORTNESS OF BREATH: 1

## 2023-05-23 NOTE — PROGRESS NOTES
OUTPATIENT CARDIOLOGY FOLLOW-UP    HPI:    Name: Dona Overton    Age: 65 y.o. Primary Care Physician: JOE Moseley    Date of Service: 5/23/2023    Chief Complaint:   Chief Complaint   Patient presents with    Chest Pain     Np- Patient has no cardiac complaints        History of present illness :   80-year-old obese chronic smoker male who comes today for follow-up visit accompanied by his fiancée. He was seen in consultation by me on 5/28/2022 due to chest pain. He has history of CAD, status post CABG with LIMA to LAD, [place] bileaflet mechanical AVR and [place] bileaflet mitral valve replacement in September 2021, status post redo mechanical MVR and AVR in March 2023 at [facility] attributed to endocarditis, dual-chamber permanent pacemaker, hypertension, chronic kidney disease, chronically elevated troponin, bifascicular block, retroperitoneal bleed, testicular cancer, alcohol abuse, hyperlipidemia, status post CVA in 2015 with left-sided residual weakness, mild bilateral carotid disease, colon polyps and noncompliance. Patient was seen by Dr. Jammie Law on February 6, 2023 due to atypical chest pain. Chest pain. He underwent a BEATRIZ which revealed mild to moderate paravalvular mitral regurgitation due to perforated mitral annulus. Patient continues to smoke. He was seen by his PCP a week ago due to shortness of breath. Chest x-ray was ordered but not done yet. Patient has not been active. He denies chest discomfort but has been complaining of dyspnea and dyspnea on mild exertion. He denies palpitations or syncope. He feels dizzy and lightheaded at times. Sleeps on 1 pillow and denies lower extremity edema. His Toprol has been discontinued! EKG done today revealed sinus rhythm at 99 bpm, left atrial enlargement and incomplete left bundle branch block. Review of Systems:   Review of Systems   Constitutional:  Negative for chills, fatigue and fever.    HENT:  Negative for

## 2023-07-14 ENCOUNTER — HOSPITAL ENCOUNTER (INPATIENT)
Age: 58
LOS: 16 days | Discharge: NURSING, REHAB OR LONG-TERM CARE | End: 2023-07-30
Attending: EMERGENCY MEDICINE | Admitting: FAMILY MEDICINE
Payer: COMMERCIAL

## 2023-07-14 ENCOUNTER — APPOINTMENT (OUTPATIENT)
Dept: GENERAL RADIOLOGY | Age: 58
End: 2023-07-14
Payer: COMMERCIAL

## 2023-07-14 ENCOUNTER — APPOINTMENT (OUTPATIENT)
Dept: CT IMAGING | Age: 58
End: 2023-07-14
Payer: COMMERCIAL

## 2023-07-14 DIAGNOSIS — F19.930 DRUG WITHDRAWAL SEIZURE WITHOUT COMPLICATION (HCC): ICD-10-CM

## 2023-07-14 DIAGNOSIS — D64.9 ANEMIA, UNSPECIFIED TYPE: ICD-10-CM

## 2023-07-14 DIAGNOSIS — F10.931 DTS (DELIRIUM TREMENS) (HCC): Primary | ICD-10-CM

## 2023-07-14 DIAGNOSIS — R56.9 DRUG WITHDRAWAL SEIZURE WITHOUT COMPLICATION (HCC): ICD-10-CM

## 2023-07-14 DIAGNOSIS — R79.1 SUPRATHERAPEUTIC INR: ICD-10-CM

## 2023-07-14 DIAGNOSIS — F10.930 ALCOHOL WITHDRAWAL SYNDROME WITHOUT COMPLICATION (HCC): ICD-10-CM

## 2023-07-14 PROBLEM — F10.939 ALCOHOL WITHDRAWAL SYNDROME WITH COMPLICATION (HCC): Status: ACTIVE | Noted: 2023-07-14

## 2023-07-14 LAB
ALBUMIN SERPL-MCNC: 4 G/DL (ref 3.5–5.2)
ALP SERPL-CCNC: 157 U/L (ref 40–129)
ALT SERPL-CCNC: 22 U/L (ref 0–40)
AMPHET UR QL SCN: NOT DETECTED
APAP SERPL-MCNC: <5 MCG/ML (ref 10–30)
AST SERPL-CCNC: 40 U/L (ref 0–39)
BACTERIA URNS QL MICRO: ABNORMAL /HPF
BARBITURATES UR QL SCN: NOT DETECTED
BASOPHILS # BLD: 0.08 E9/L (ref 0–0.2)
BASOPHILS NFR BLD: 2 % (ref 0–2)
BENZODIAZ UR QL SCN: NOT DETECTED
BILIRUB DIRECT SERPL-MCNC: 0.2 MG/DL (ref 0–0.3)
BILIRUB INDIRECT SERPL-MCNC: 0.2 MG/DL (ref 0–1)
BILIRUB SERPL-MCNC: 0.4 MG/DL (ref 0–1.2)
BILIRUB UR QL STRIP: NEGATIVE
BNP BLD-MCNC: 1078 PG/ML (ref 0–125)
CANNABINOIDS UR QL SCN: NOT DETECTED
CLARITY UR: CLEAR
COCAINE UR QL SCN: NOT DETECTED
COLOR UR: YELLOW
DRUG SCREEN COMMENT UR-IMP: NORMAL
EOSINOPHIL # BLD: 0.14 E9/L (ref 0.05–0.5)
EOSINOPHIL NFR BLD: 3.6 % (ref 0–6)
ERYTHROCYTE [DISTWIDTH] IN BLOOD BY AUTOMATED COUNT: 17.9 FL (ref 11.5–15)
ETHANOLAMINE SERPL-MCNC: 215 MG/DL (ref 0–0.08)
FENTANYL SCREEN, URINE: NOT DETECTED
GLUCOSE UR STRIP-MCNC: NEGATIVE MG/DL
HCT VFR BLD AUTO: 31.8 % (ref 37–54)
HGB BLD-MCNC: 9.3 G/DL (ref 12.5–16.5)
HGB UR QL STRIP: ABNORMAL
IMM GRANULOCYTES # BLD: 0 E9/L
IMM GRANULOCYTES NFR BLD: 0 % (ref 0–5)
INR BLD: 1.3
INR BLD: >10
KETONES UR STRIP-MCNC: NEGATIVE MG/DL
LACTATE BLDV-SCNC: 2.3 MMOL/L (ref 0.5–2.2)
LEUKOCYTE ESTERASE UR QL STRIP: NEGATIVE
LIPASE: 29 U/L (ref 13–60)
LYMPHOCYTES # BLD: 1.4 E9/L (ref 1.5–4)
LYMPHOCYTES NFR BLD: 35.8 % (ref 20–42)
MAGNESIUM SERPL-MCNC: 1.6 MG/DL (ref 1.6–2.6)
MCH RBC QN AUTO: 24.5 PG (ref 26–35)
MCHC RBC AUTO-ENTMCNC: 29.2 % (ref 32–34.5)
MCV RBC AUTO: 83.7 FL (ref 80–99.9)
METHADONE UR QL SCN: NOT DETECTED
MONOCYTES # BLD: 0.49 E9/L (ref 0.1–0.95)
MONOCYTES NFR BLD: 12.5 % (ref 2–12)
NEUTROPHILS # BLD: 1.8 E9/L (ref 1.8–7.3)
NEUTS SEG NFR BLD: 46.1 % (ref 43–80)
NITRITE UR QL STRIP: NEGATIVE
OPIATES UR QL SCN: NOT DETECTED
OXYCODONE URINE: NOT DETECTED
PCP UR QL SCN: NOT DETECTED
PH UR STRIP: 6.5 [PH] (ref 5–9)
PLATELET # BLD AUTO: 178 E9/L (ref 130–450)
PMV BLD AUTO: 11.6 FL (ref 7–12)
PROT SERPL-MCNC: 7.2 G/DL (ref 6.4–8.3)
PROT UR STRIP-MCNC: 100 MG/DL
PROTHROMBIN TIME: 13.8 SEC (ref 9.3–12.4)
PROTHROMBIN TIME: >120 SEC (ref 9.3–12.4)
RBC # BLD AUTO: 3.8 E12/L (ref 3.8–5.8)
RBC #/AREA URNS HPF: >20 /HPF (ref 0–2)
SALICYLATES SERPL-MCNC: <0.3 MG/DL (ref 0–30)
SP GR UR STRIP: 1.01 (ref 1–1.03)
TRICYCLIC ANTIDEPRESSANTS SCREEN SERUM: NEGATIVE NG/ML
TROPONIN, HIGH SENSITIVITY: 72 NG/L (ref 0–11)
UROBILINOGEN UR STRIP-ACNC: 1 E.U./DL
WBC # BLD: 3.9 E9/L (ref 4.5–11.5)
WBC #/AREA URNS HPF: ABNORMAL /HPF (ref 0–5)

## 2023-07-14 PROCEDURE — 83880 ASSAY OF NATRIURETIC PEPTIDE: CPT

## 2023-07-14 PROCEDURE — 80307 DRUG TEST PRSMV CHEM ANLYZR: CPT

## 2023-07-14 PROCEDURE — 2580000003 HC RX 258: Performed by: EMERGENCY MEDICINE

## 2023-07-14 PROCEDURE — 96365 THER/PROPH/DIAG IV INF INIT: CPT

## 2023-07-14 PROCEDURE — 36415 COLL VENOUS BLD VENIPUNCTURE: CPT

## 2023-07-14 PROCEDURE — 6360000002 HC RX W HCPCS: Performed by: EMERGENCY MEDICINE

## 2023-07-14 PROCEDURE — 80143 DRUG ASSAY ACETAMINOPHEN: CPT

## 2023-07-14 PROCEDURE — 84484 ASSAY OF TROPONIN QUANT: CPT

## 2023-07-14 PROCEDURE — 80179 DRUG ASSAY SALICYLATE: CPT

## 2023-07-14 PROCEDURE — 70450 CT HEAD/BRAIN W/O DYE: CPT

## 2023-07-14 PROCEDURE — 2060000000 HC ICU INTERMEDIATE R&B

## 2023-07-14 PROCEDURE — 80076 HEPATIC FUNCTION PANEL: CPT

## 2023-07-14 PROCEDURE — 71046 X-RAY EXAM CHEST 2 VIEWS: CPT

## 2023-07-14 PROCEDURE — 85610 PROTHROMBIN TIME: CPT

## 2023-07-14 PROCEDURE — 96361 HYDRATE IV INFUSION ADD-ON: CPT

## 2023-07-14 PROCEDURE — 93005 ELECTROCARDIOGRAM TRACING: CPT | Performed by: EMERGENCY MEDICINE

## 2023-07-14 PROCEDURE — 83690 ASSAY OF LIPASE: CPT

## 2023-07-14 PROCEDURE — 85025 COMPLETE CBC W/AUTO DIFF WBC: CPT

## 2023-07-14 PROCEDURE — 99285 EMERGENCY DEPT VISIT HI MDM: CPT

## 2023-07-14 PROCEDURE — 5A09557 ASSISTANCE WITH RESPIRATORY VENTILATION, GREATER THAN 96 CONSECUTIVE HOURS, CONTINUOUS POSITIVE AIRWAY PRESSURE: ICD-10-PCS | Performed by: INTERNAL MEDICINE

## 2023-07-14 PROCEDURE — 83605 ASSAY OF LACTIC ACID: CPT

## 2023-07-14 PROCEDURE — 83735 ASSAY OF MAGNESIUM: CPT

## 2023-07-14 PROCEDURE — 31500 INSERT EMERGENCY AIRWAY: CPT

## 2023-07-14 PROCEDURE — HZ2ZZZZ DETOXIFICATION SERVICES FOR SUBSTANCE ABUSE TREATMENT: ICD-10-PCS | Performed by: FAMILY MEDICINE

## 2023-07-14 PROCEDURE — 96375 TX/PRO/DX INJ NEW DRUG ADDON: CPT

## 2023-07-14 PROCEDURE — 6370000000 HC RX 637 (ALT 250 FOR IP): Performed by: EMERGENCY MEDICINE

## 2023-07-14 PROCEDURE — 72125 CT NECK SPINE W/O DYE: CPT

## 2023-07-14 PROCEDURE — 81001 URINALYSIS AUTO W/SCOPE: CPT

## 2023-07-14 PROCEDURE — 82077 ASSAY SPEC XCP UR&BREATH IA: CPT

## 2023-07-14 RX ORDER — SODIUM CHLORIDE 0.9 % (FLUSH) 0.9 %
5-40 SYRINGE (ML) INJECTION PRN
Status: DISCONTINUED | OUTPATIENT
Start: 2023-07-14 | End: 2023-07-30 | Stop reason: HOSPADM

## 2023-07-14 RX ORDER — LORAZEPAM 1 MG/1
2 TABLET ORAL
Status: DISCONTINUED | OUTPATIENT
Start: 2023-07-14 | End: 2023-07-30 | Stop reason: HOSPADM

## 2023-07-14 RX ORDER — THIAMINE HYDROCHLORIDE 100 MG/ML
100 INJECTION, SOLUTION INTRAMUSCULAR; INTRAVENOUS DAILY
Status: DISCONTINUED | OUTPATIENT
Start: 2023-07-14 | End: 2023-07-21

## 2023-07-14 RX ORDER — LORAZEPAM 1 MG/1
1 TABLET ORAL
Status: DISCONTINUED | OUTPATIENT
Start: 2023-07-14 | End: 2023-07-30 | Stop reason: HOSPADM

## 2023-07-14 RX ORDER — LORAZEPAM 2 MG/ML
3 INJECTION INTRAMUSCULAR
Status: DISCONTINUED | OUTPATIENT
Start: 2023-07-14 | End: 2023-07-30 | Stop reason: HOSPADM

## 2023-07-14 RX ORDER — SODIUM CHLORIDE 9 MG/ML
50 INJECTION, SOLUTION INTRAVENOUS ONCE
Status: COMPLETED | OUTPATIENT
Start: 2023-07-14 | End: 2023-07-14

## 2023-07-14 RX ORDER — LORAZEPAM 2 MG/ML
4 INJECTION INTRAMUSCULAR
Status: DISCONTINUED | OUTPATIENT
Start: 2023-07-14 | End: 2023-07-30 | Stop reason: HOSPADM

## 2023-07-14 RX ORDER — 0.9 % SODIUM CHLORIDE 0.9 %
1000 INTRAVENOUS SOLUTION INTRAVENOUS ONCE
Status: COMPLETED | OUTPATIENT
Start: 2023-07-14 | End: 2023-07-14

## 2023-07-14 RX ORDER — LORAZEPAM 1 MG/1
3 TABLET ORAL
Status: DISCONTINUED | OUTPATIENT
Start: 2023-07-14 | End: 2023-07-30 | Stop reason: HOSPADM

## 2023-07-14 RX ORDER — LORAZEPAM 1 MG/1
4 TABLET ORAL
Status: DISCONTINUED | OUTPATIENT
Start: 2023-07-14 | End: 2023-07-30 | Stop reason: HOSPADM

## 2023-07-14 RX ORDER — SODIUM CHLORIDE 9 MG/ML
INJECTION, SOLUTION INTRAVENOUS PRN
Status: DISCONTINUED | OUTPATIENT
Start: 2023-07-14 | End: 2023-07-30 | Stop reason: HOSPADM

## 2023-07-14 RX ORDER — FOLIC ACID 1 MG/1
1 TABLET ORAL DAILY
Status: DISCONTINUED | OUTPATIENT
Start: 2023-07-14 | End: 2023-07-17

## 2023-07-14 RX ORDER — LORAZEPAM 2 MG/ML
1 INJECTION INTRAMUSCULAR
Status: DISCONTINUED | OUTPATIENT
Start: 2023-07-14 | End: 2023-07-30 | Stop reason: HOSPADM

## 2023-07-14 RX ORDER — LORAZEPAM 2 MG/ML
2 INJECTION INTRAMUSCULAR
Status: DISCONTINUED | OUTPATIENT
Start: 2023-07-14 | End: 2023-07-30 | Stop reason: HOSPADM

## 2023-07-14 RX ORDER — SODIUM CHLORIDE 0.9 % (FLUSH) 0.9 %
5-40 SYRINGE (ML) INJECTION EVERY 12 HOURS SCHEDULED
Status: DISCONTINUED | OUTPATIENT
Start: 2023-07-14 | End: 2023-07-30 | Stop reason: HOSPADM

## 2023-07-14 RX ADMIN — SODIUM CHLORIDE 1000 ML: 9 INJECTION, SOLUTION INTRAVENOUS at 17:56

## 2023-07-14 RX ADMIN — FOLIC ACID 1 MG: 1 TABLET ORAL at 18:04

## 2023-07-14 RX ADMIN — THIAMINE HYDROCHLORIDE 100 MG: 100 INJECTION, SOLUTION INTRAMUSCULAR; INTRAVENOUS at 18:04

## 2023-07-14 RX ADMIN — LORAZEPAM 1 MG: 1 TABLET ORAL at 20:36

## 2023-07-14 RX ADMIN — SODIUM CHLORIDE 50 ML: 9 INJECTION, SOLUTION INTRAVENOUS at 20:28

## 2023-07-14 RX ADMIN — Medication 10 ML: at 21:03

## 2023-07-14 RX ADMIN — PROTHROMBIN, COAGULATION FACTOR VII HUMAN, COAGULATION FACTOR IX HUMAN, COAGULATION FACTOR X HUMAN, PROTEIN C, PROTEIN S HUMAN, AND WATER 5000 UNITS: KIT at 19:54

## 2023-07-14 RX ADMIN — PHYTONADIONE 10 MG: 10 INJECTION, EMULSION INTRAMUSCULAR; INTRAVENOUS; SUBCUTANEOUS at 21:03

## 2023-07-14 RX ADMIN — LORAZEPAM 1 MG: 1 TABLET ORAL at 18:04

## 2023-07-14 ASSESSMENT — PAIN SCALES - GENERAL: PAINLEVEL_OUTOF10: 7

## 2023-07-14 ASSESSMENT — PAIN - FUNCTIONAL ASSESSMENT: PAIN_FUNCTIONAL_ASSESSMENT: 0-10

## 2023-07-14 NOTE — ED PROVIDER NOTES
[address] [facility] [address] ENCOUNTER        Pt Name: Radha Nj  MRN: [id number]  [address] [facility] 1965  Date of evaluation: 7/14/2023  Provider: Brenda Ortiz DO  PCP: JOE Ko  Note Started: 5:42 PM EDT 7/14/23    CHIEF COMPLAINT       Chief Complaint   Patient presents with    Alcohol Problem     Patient states he is going through alcohol withdrawal and had 2 seizures and fall today . +hit head +blood thinners . Last drink this am       HISTORY OF PRESENT ILLNESS: 1 or more Elements        Limitations to history : None    Radha Nj is a 63 y.o. male who presents for for alcohol withdrawal.  His last drink was this morning. He states that he had a seizure yesterday and another seizure today. He did hit his head. He is on Coumadin as he has had valve replacement. Denies any nausea or vomiting. Denies any fever or chills. He is that he drinks 4-5 tall beers daily. Nursing Notes were all reviewed and agreed with or any disagreements were addressed in the HPI. REVIEW OF EXTERNAL NOTE :       Reviewed office visit on 5/23/2023. Chart Review/External Note Review    Last Echo reviewed by Me:  Lab Results   Component Value Date    LVEF 55 02/07/2023             Controlled Substance Monitoring:    Acute and Chronic Pain Monitoring:   No flowsheet data found. REVIEW OF SYSTEMS :      Positives and Pertinent negatives as per HPI.      SURGICAL HISTORY     Past Surgical History:   Procedure Laterality Date    COLONOSCOPY N/A 8/24/2022    COLONOSCOPY POLYPECTOMY HOT BIOPSY performed by Batool Guido MD at [facility] 3/70/9097     PACEMAKER PLACEMENT  09/2020    UPPER GASTROINTESTINAL ENDOSCOPY N/A 8/24/2022    EGD DIAGNOSTIC ONLY performed by Batool Guido MD at [address] [place] [address]       Previous Medications    ACETAMINOPHEN ([place])

## 2023-07-15 ENCOUNTER — APPOINTMENT (OUTPATIENT)
Dept: GENERAL RADIOLOGY | Age: 58
End: 2023-07-15
Payer: COMMERCIAL

## 2023-07-15 PROBLEM — F10.931 DTS (DELIRIUM TREMENS) (HCC): Status: ACTIVE | Noted: 2023-07-15

## 2023-07-15 LAB
AADO2: 553.3 MMHG
ALBUMIN SERPL-MCNC: 4 G/DL (ref 3.5–5.2)
ALP SERPL-CCNC: 164 U/L (ref 40–129)
ALT SERPL-CCNC: 19 U/L (ref 0–40)
AMMONIA PLAS-SCNC: 68 UMOL/L (ref 16–60)
ANION GAP SERPL CALCULATED.3IONS-SCNC: 13 MMOL/L (ref 7–16)
AST SERPL-CCNC: 42 U/L (ref 0–39)
B.E.: -0.5 MMOL/L (ref -3–3)
B.E.: -0.7 MMOL/L (ref -3–3)
B.E.: -1.2 MMOL/L (ref -3–3)
BASOPHILS # BLD: 0.05 K/UL (ref 0–0.2)
BASOPHILS NFR BLD: 1 % (ref 0–2)
BILIRUB SERPL-MCNC: 0.9 MG/DL (ref 0–1.2)
BUN SERPL-MCNC: 10 MG/DL (ref 6–20)
CALCIUM SERPL-MCNC: 8.4 MG/DL (ref 8.6–10.2)
CHLORIDE SERPL-SCNC: 104 MMOL/L (ref 98–107)
CO2 SERPL-SCNC: 25 MMOL/L (ref 22–29)
COHB: 1.1 % (ref 0–1.5)
COHB: 1.2 % (ref 0–1.5)
COHB: 1.3 % (ref 0–1.5)
CREAT SERPL-MCNC: 1.6 MG/DL (ref 0.7–1.2)
CRITICAL: ABNORMAL
DATE ANALYZED: ABNORMAL
DATE OF COLLECTION: ABNORMAL
EOSINOPHIL # BLD: 0.06 K/UL (ref 0.05–0.5)
EOSINOPHILS RELATIVE PERCENT: 1 % (ref 0–6)
ERYTHROCYTE [DISTWIDTH] IN BLOOD BY AUTOMATED COUNT: 17.9 % (ref 11.5–15)
ERYTHROCYTE [DISTWIDTH] IN BLOOD BY AUTOMATED COUNT: 18 % (ref 11.5–15)
FERRITIN SERPL-MCNC: 46 NG/ML
FIO2: 95 %
GFR SERPL CREATININE-BSD FRML MDRD: 50 ML/MIN/1.73M2
GLUCOSE SERPL-MCNC: 122 MG/DL (ref 74–99)
HCO3: 23.5 MMOL/L (ref 22–26)
HCO3: 24.6 MMOL/L (ref 22–26)
HCO3: 25.2 MMOL/L (ref 22–26)
HCT VFR BLD AUTO: 31.3 % (ref 37–54)
HCT VFR BLD AUTO: 32.9 % (ref 37–54)
HGB BLD-MCNC: 8.8 G/DL (ref 12.5–16.5)
HGB BLD-MCNC: 9.7 G/DL (ref 12.5–16.5)
HHB: 14.2 % (ref 0–5)
HHB: 3.1 % (ref 0–5)
HHB: 3.3 % (ref 0–5)
IMM GRANULOCYTES # BLD AUTO: 0.04 K/UL (ref 0–0.58)
IMM GRANULOCYTES NFR BLD: 1 % (ref 0–5)
IMM RETICS NFR: 33.2 % (ref 2.3–13.4)
INR PPP: 1.5
IRON SATN MFR SERPL: 36 % (ref 20–55)
IRON SERPL-MCNC: 136 UG/DL (ref 59–158)
LAB: ABNORMAL
LACTATE BLDV-SCNC: 1.9 MMOL/L (ref 0.5–1.9)
LACTATE BLDV-SCNC: 1.9 MMOL/L (ref 0.5–1.9)
LDH SERPL-CCNC: 361 U/L (ref 135–225)
LYMPHOCYTES # BLD: 8 % (ref 20–42)
LYMPHOCYTES NFR BLD: 0.6 K/UL (ref 1.5–4)
Lab: ABNORMAL
MAGNESIUM SERPL-MCNC: 1.4 MG/DL (ref 1.6–2.6)
MCH RBC QN AUTO: 24.4 PG (ref 26–35)
MCH RBC QN AUTO: 24.8 PG (ref 26–35)
MCHC RBC AUTO-ENTMCNC: 28.1 G/DL (ref 32–34.5)
MCHC RBC AUTO-ENTMCNC: 29.5 G/DL (ref 32–34.5)
MCV RBC AUTO: 84.1 FL (ref 80–99.9)
MCV RBC AUTO: 86.7 FL (ref 80–99.9)
METHB: 0.4 % (ref 0–1.5)
MODE: ABNORMAL
MODE: ABNORMAL
MODE: AC
MONOCYTES NFR BLD: 0.82 K/UL (ref 0.1–0.95)
MONOCYTES NFR BLD: 11 % (ref 2–12)
NEUTROPHILS NFR BLD: 80 % (ref 43–80)
NEUTS SEG NFR BLD: 6.2 K/UL (ref 1.8–7.3)
O2 CONTENT: 12.1 ML/DL
O2 CONTENT: 13.9 ML/DL
O2 CONTENT: 14.7 ML/DL
O2 SATURATION: 85.6 % (ref 92–98.5)
O2 SATURATION: 96.6 % (ref 92–98.5)
O2 SATURATION: 96.8 % (ref 92–98.5)
O2HB: 84.2 % (ref 94–97)
O2HB: 95.2 % (ref 94–97)
O2HB: 95.2 % (ref 94–97)
OPERATOR ID: 2593
OPERATOR ID: 421
OPERATOR ID: 7490
PARTIAL THROMBOPLASTIN TIME: 30.6 SEC (ref 24.5–35.1)
PATIENT TEMP: 37 C
PCO2: 39 MMHG (ref 35–45)
PCO2: 43.8 MMHG (ref 35–45)
PCO2: 46 MMHG (ref 35–45)
PEEP/CPAP: 5 CMH2O
PFO2: 0.59 MMHG/%
PH BLOOD GAS: 7.36 (ref 7.35–7.45)
PH BLOOD GAS: 7.37 (ref 7.35–7.45)
PH BLOOD GAS: 7.4 (ref 7.35–7.45)
PLATELET # BLD AUTO: 150 K/UL (ref 130–450)
PLATELET # BLD AUTO: 161 K/UL (ref 130–450)
PMV BLD AUTO: 11.6 FL (ref 7–12)
PMV BLD AUTO: 11.8 FL (ref 7–12)
PO2: 56 MMHG (ref 75–100)
PO2: 91.9 MMHG (ref 75–100)
PO2: 98 MMHG (ref 75–100)
POTASSIUM SERPL-SCNC: 3.5 MMOL/L (ref 3.5–5)
PROCALCITONIN SERPL-MCNC: 0.17 NG/ML (ref 0–0.08)
PROT SERPL-MCNC: 7.3 G/DL (ref 6.4–8.3)
PROTHROMBIN TIME: 16.5 SEC (ref 9.3–12.4)
RBC # BLD AUTO: 3.61 M/UL (ref 3.8–5.8)
RBC # BLD AUTO: 3.91 M/UL (ref 3.8–5.8)
RETIC HEMOGLOBIN: 22.9 PG (ref 28.2–36.6)
RETICS # AUTO: 0.09 M/UL
RETICS/RBC NFR AUTO: 2.5 % (ref 0.4–1.9)
RI(T): 9.88
SODIUM SERPL-SCNC: 142 MMOL/L (ref 132–146)
SOURCE, BLOOD GAS: ABNORMAL
THB: 10.2 G/DL (ref 11.5–16.5)
THB: 10.3 G/DL (ref 11.5–16.5)
THB: 10.9 G/DL (ref 11.5–16.5)
TIBC SERPL-MCNC: 380 UG/DL (ref 250–450)
TIME ANALYZED: 1340
TIME ANALYZED: 1607
TIME ANALYZED: 542
TRANSFERRIN SERPL-MCNC: 315 MG/DL (ref 200–360)
VT MECHANICAL: 480 ML
WBC OTHER # BLD: 7.8 K/UL (ref 4.5–11.5)
WBC OTHER # BLD: 8.1 K/UL (ref 4.5–11.5)

## 2023-07-15 PROCEDURE — 83735 ASSAY OF MAGNESIUM: CPT

## 2023-07-15 PROCEDURE — 2580000003 HC RX 258: Performed by: INTERNAL MEDICINE

## 2023-07-15 PROCEDURE — 82728 ASSAY OF FERRITIN: CPT

## 2023-07-15 PROCEDURE — 84466 ASSAY OF TRANSFERRIN: CPT

## 2023-07-15 PROCEDURE — 85730 THROMBOPLASTIN TIME PARTIAL: CPT

## 2023-07-15 PROCEDURE — 89220 SPUTUM SPECIMEN COLLECTION: CPT

## 2023-07-15 PROCEDURE — 94664 DEMO&/EVAL PT USE INHALER: CPT

## 2023-07-15 PROCEDURE — 6360000002 HC RX W HCPCS

## 2023-07-15 PROCEDURE — 85045 AUTOMATED RETICULOCYTE COUNT: CPT

## 2023-07-15 PROCEDURE — 74018 RADEX ABDOMEN 1 VIEW: CPT

## 2023-07-15 PROCEDURE — 99291 CRITICAL CARE FIRST HOUR: CPT | Performed by: INTERNAL MEDICINE

## 2023-07-15 PROCEDURE — 6360000002 HC RX W HCPCS: Performed by: INTERNAL MEDICINE

## 2023-07-15 PROCEDURE — 87070 CULTURE OTHR SPECIMN AEROBIC: CPT

## 2023-07-15 PROCEDURE — 2500000003 HC RX 250 WO HCPCS: Performed by: INTERNAL MEDICINE

## 2023-07-15 PROCEDURE — 84100 ASSAY OF PHOSPHORUS: CPT

## 2023-07-15 PROCEDURE — 6360000002 HC RX W HCPCS: Performed by: FAMILY MEDICINE

## 2023-07-15 PROCEDURE — 6370000000 HC RX 637 (ALT 250 FOR IP): Performed by: FAMILY MEDICINE

## 2023-07-15 PROCEDURE — 83615 LACTATE (LD) (LDH) ENZYME: CPT

## 2023-07-15 PROCEDURE — 0BH17EZ INSERTION OF ENDOTRACHEAL AIRWAY INTO TRACHEA, VIA NATURAL OR ARTIFICIAL OPENING: ICD-10-PCS | Performed by: INTERNAL MEDICINE

## 2023-07-15 PROCEDURE — 85610 PROTHROMBIN TIME: CPT

## 2023-07-15 PROCEDURE — 84145 PROCALCITONIN (PCT): CPT

## 2023-07-15 PROCEDURE — 36415 COLL VENOUS BLD VENIPUNCTURE: CPT

## 2023-07-15 PROCEDURE — 2000000000 HC ICU R&B

## 2023-07-15 PROCEDURE — 80053 COMPREHEN METABOLIC PANEL: CPT

## 2023-07-15 PROCEDURE — 2580000003 HC RX 258: Performed by: FAMILY MEDICINE

## 2023-07-15 PROCEDURE — 87205 SMEAR GRAM STAIN: CPT

## 2023-07-15 PROCEDURE — 31500 INSERT EMERGENCY AIRWAY: CPT

## 2023-07-15 PROCEDURE — 6370000000 HC RX 637 (ALT 250 FOR IP): Performed by: INTERNAL MEDICINE

## 2023-07-15 PROCEDURE — 31603 EMER TRACHEOSTOMY TTRACH: CPT | Performed by: INTERNAL MEDICINE

## 2023-07-15 PROCEDURE — 2500000003 HC RX 250 WO HCPCS

## 2023-07-15 PROCEDURE — 94002 VENT MGMT INPAT INIT DAY: CPT

## 2023-07-15 PROCEDURE — 87081 CULTURE SCREEN ONLY: CPT

## 2023-07-15 PROCEDURE — 71045 X-RAY EXAM CHEST 1 VIEW: CPT

## 2023-07-15 PROCEDURE — 83540 ASSAY OF IRON: CPT

## 2023-07-15 PROCEDURE — 6370000000 HC RX 637 (ALT 250 FOR IP): Performed by: EMERGENCY MEDICINE

## 2023-07-15 PROCEDURE — 85027 COMPLETE CBC AUTOMATED: CPT

## 2023-07-15 PROCEDURE — 82140 ASSAY OF AMMONIA: CPT

## 2023-07-15 PROCEDURE — 2700000000 HC OXYGEN THERAPY PER DAY

## 2023-07-15 PROCEDURE — 83550 IRON BINDING TEST: CPT

## 2023-07-15 PROCEDURE — 93005 ELECTROCARDIOGRAM TRACING: CPT | Performed by: EMERGENCY MEDICINE

## 2023-07-15 PROCEDURE — 36600 WITHDRAWAL OF ARTERIAL BLOOD: CPT

## 2023-07-15 PROCEDURE — 94667 MNPJ CHEST WALL 1ST: CPT

## 2023-07-15 PROCEDURE — 82805 BLOOD GASES W/O2 SATURATION: CPT

## 2023-07-15 PROCEDURE — 94640 AIRWAY INHALATION TREATMENT: CPT

## 2023-07-15 PROCEDURE — 83605 ASSAY OF LACTIC ACID: CPT

## 2023-07-15 PROCEDURE — 5A1955Z RESPIRATORY VENTILATION, GREATER THAN 96 CONSECUTIVE HOURS: ICD-10-PCS | Performed by: INTERNAL MEDICINE

## 2023-07-15 RX ORDER — LEVETIRACETAM 500 MG/5ML
500 INJECTION, SOLUTION, CONCENTRATE INTRAVENOUS EVERY 12 HOURS
Status: DISCONTINUED | OUTPATIENT
Start: 2023-07-15 | End: 2023-07-17

## 2023-07-15 RX ORDER — POLYETHYLENE GLYCOL 3350 17 G/17G
17 POWDER, FOR SOLUTION ORAL DAILY PRN
Status: DISCONTINUED | OUTPATIENT
Start: 2023-07-15 | End: 2023-07-30 | Stop reason: HOSPADM

## 2023-07-15 RX ORDER — SUCRALFATE 1 G/1
1 TABLET ORAL
Status: DISCONTINUED | OUTPATIENT
Start: 2023-07-15 | End: 2023-07-30 | Stop reason: HOSPADM

## 2023-07-15 RX ORDER — ACETAMINOPHEN 650 MG/1
650 SUPPOSITORY RECTAL EVERY 6 HOURS PRN
Status: DISCONTINUED | OUTPATIENT
Start: 2023-07-15 | End: 2023-07-30 | Stop reason: HOSPADM

## 2023-07-15 RX ORDER — ROSUVASTATIN CALCIUM 10 MG/1
10 TABLET, COATED ORAL EVERY MORNING
Status: DISCONTINUED | OUTPATIENT
Start: 2023-07-15 | End: 2023-07-30 | Stop reason: HOSPADM

## 2023-07-15 RX ORDER — POTASSIUM CHLORIDE 7.45 MG/ML
10 INJECTION INTRAVENOUS
Status: COMPLETED | OUTPATIENT
Start: 2023-07-15 | End: 2023-07-15

## 2023-07-15 RX ORDER — PROPOFOL 10 MG/ML
INJECTION, EMULSION INTRAVENOUS
Status: DISPENSED
Start: 2023-07-15 | End: 2023-07-16

## 2023-07-15 RX ORDER — ETOMIDATE 2 MG/ML
INJECTION INTRAVENOUS
Status: COMPLETED
Start: 2023-07-15 | End: 2023-07-15

## 2023-07-15 RX ORDER — FOLIC ACID 1 MG/1
1 TABLET ORAL DAILY
Status: DISCONTINUED | OUTPATIENT
Start: 2023-07-15 | End: 2023-07-30 | Stop reason: HOSPADM

## 2023-07-15 RX ORDER — ENOXAPARIN SODIUM 100 MG/ML
40 INJECTION SUBCUTANEOUS DAILY
Status: DISCONTINUED | OUTPATIENT
Start: 2023-07-15 | End: 2023-07-15

## 2023-07-15 RX ORDER — HEPARIN SODIUM 10000 [USP'U]/100ML
5-30 INJECTION, SOLUTION INTRAVENOUS CONTINUOUS
Status: DISCONTINUED | OUTPATIENT
Start: 2023-07-15 | End: 2023-07-28

## 2023-07-15 RX ORDER — ONDANSETRON 2 MG/ML
4 INJECTION INTRAMUSCULAR; INTRAVENOUS EVERY 6 HOURS PRN
Status: DISCONTINUED | OUTPATIENT
Start: 2023-07-15 | End: 2023-07-15

## 2023-07-15 RX ORDER — VALPROIC ACID 250 MG/1
250 CAPSULE, LIQUID FILLED ORAL EVERY 8 HOURS
Status: DISCONTINUED | OUTPATIENT
Start: 2023-07-15 | End: 2023-07-30 | Stop reason: HOSPADM

## 2023-07-15 RX ORDER — HEPARIN SODIUM 1000 [USP'U]/ML
4000 INJECTION, SOLUTION INTRAVENOUS; SUBCUTANEOUS PRN
Status: DISCONTINUED | OUTPATIENT
Start: 2023-07-15 | End: 2023-07-30 | Stop reason: HOSPADM

## 2023-07-15 RX ORDER — HALOPERIDOL 5 MG/1
5 TABLET ORAL NIGHTLY
Status: DISCONTINUED | OUTPATIENT
Start: 2023-07-15 | End: 2023-07-17

## 2023-07-15 RX ORDER — HEPARIN SODIUM 1000 [USP'U]/ML
80 INJECTION, SOLUTION INTRAVENOUS; SUBCUTANEOUS ONCE
Status: DISCONTINUED | OUTPATIENT
Start: 2023-07-15 | End: 2023-07-15 | Stop reason: DRUGHIGH

## 2023-07-15 RX ORDER — HEPARIN SODIUM 1000 [USP'U]/ML
40 INJECTION, SOLUTION INTRAVENOUS; SUBCUTANEOUS PRN
Status: DISCONTINUED | OUTPATIENT
Start: 2023-07-15 | End: 2023-07-15 | Stop reason: DRUGHIGH

## 2023-07-15 RX ORDER — SUCCINYLCHOLINE CHLORIDE 20 MG/ML
INJECTION INTRAMUSCULAR; INTRAVENOUS
Status: COMPLETED
Start: 2023-07-15 | End: 2023-07-15

## 2023-07-15 RX ORDER — ACETYLCYSTEINE 100 MG/ML
600 SOLUTION ORAL; RESPIRATORY (INHALATION) EVERY 4 HOURS
Status: DISCONTINUED | OUTPATIENT
Start: 2023-07-15 | End: 2023-07-22

## 2023-07-15 RX ORDER — IPRATROPIUM BROMIDE AND ALBUTEROL SULFATE 2.5; .5 MG/3ML; MG/3ML
1 SOLUTION RESPIRATORY (INHALATION)
Status: DISCONTINUED | OUTPATIENT
Start: 2023-07-15 | End: 2023-07-30 | Stop reason: HOSPADM

## 2023-07-15 RX ORDER — METOPROLOL SUCCINATE 25 MG/1
25 TABLET, EXTENDED RELEASE ORAL DAILY
Status: DISCONTINUED | OUTPATIENT
Start: 2023-07-15 | End: 2023-07-30 | Stop reason: HOSPADM

## 2023-07-15 RX ORDER — DULOXETIN HYDROCHLORIDE 30 MG/1
30 CAPSULE, DELAYED RELEASE ORAL DAILY
Status: DISCONTINUED | OUTPATIENT
Start: 2023-07-15 | End: 2023-07-17

## 2023-07-15 RX ORDER — HEPARIN SODIUM 10000 [USP'U]/ML
5000 INJECTION, SOLUTION INTRAVENOUS; SUBCUTANEOUS EVERY 8 HOURS
Status: DISCONTINUED | OUTPATIENT
Start: 2023-07-15 | End: 2023-07-15

## 2023-07-15 RX ORDER — PROPOFOL 10 MG/ML
100 INJECTION, EMULSION INTRAVENOUS ONCE
Status: COMPLETED | OUTPATIENT
Start: 2023-07-15 | End: 2023-07-15

## 2023-07-15 RX ORDER — HEPARIN SODIUM 1000 [USP'U]/ML
2000 INJECTION, SOLUTION INTRAVENOUS; SUBCUTANEOUS PRN
Status: DISCONTINUED | OUTPATIENT
Start: 2023-07-15 | End: 2023-07-30 | Stop reason: HOSPADM

## 2023-07-15 RX ORDER — PANTOPRAZOLE SODIUM 40 MG/1
40 TABLET, DELAYED RELEASE ORAL
Status: DISCONTINUED | OUTPATIENT
Start: 2023-07-15 | End: 2023-07-16

## 2023-07-15 RX ORDER — ACETAMINOPHEN 325 MG/1
650 TABLET ORAL EVERY 6 HOURS PRN
Status: DISCONTINUED | OUTPATIENT
Start: 2023-07-15 | End: 2023-07-30 | Stop reason: HOSPADM

## 2023-07-15 RX ORDER — POTASSIUM CHLORIDE 20 MEQ/1
20 TABLET, EXTENDED RELEASE ORAL ONCE
Status: COMPLETED | OUTPATIENT
Start: 2023-07-15 | End: 2023-07-15

## 2023-07-15 RX ORDER — LEVETIRACETAM 500 MG/1
250 TABLET ORAL DAILY
Status: DISCONTINUED | OUTPATIENT
Start: 2023-07-15 | End: 2023-07-19

## 2023-07-15 RX ORDER — HEPARIN SODIUM 1000 [USP'U]/ML
4000 INJECTION, SOLUTION INTRAVENOUS; SUBCUTANEOUS ONCE
Status: COMPLETED | OUTPATIENT
Start: 2023-07-15 | End: 2023-07-15

## 2023-07-15 RX ORDER — SODIUM CHLORIDE 0.9 % (FLUSH) 0.9 %
10 SYRINGE (ML) INJECTION PRN
Status: DISCONTINUED | OUTPATIENT
Start: 2023-07-15 | End: 2023-07-30 | Stop reason: HOSPADM

## 2023-07-15 RX ORDER — MIDAZOLAM HYDROCHLORIDE 1 MG/ML
1-10 INJECTION, SOLUTION INTRAVENOUS CONTINUOUS
Status: DISCONTINUED | OUTPATIENT
Start: 2023-07-15 | End: 2023-07-18

## 2023-07-15 RX ORDER — MIRTAZAPINE 15 MG/1
15 TABLET, FILM COATED ORAL NIGHTLY
Status: DISCONTINUED | OUTPATIENT
Start: 2023-07-15 | End: 2023-07-17

## 2023-07-15 RX ORDER — FUROSEMIDE 10 MG/ML
40 INJECTION INTRAMUSCULAR; INTRAVENOUS DAILY
Status: DISCONTINUED | OUTPATIENT
Start: 2023-07-15 | End: 2023-07-17

## 2023-07-15 RX ORDER — HEPARIN SODIUM 10000 [USP'U]/100ML
5-30 INJECTION, SOLUTION INTRAVENOUS CONTINUOUS
Status: DISCONTINUED | OUTPATIENT
Start: 2023-07-15 | End: 2023-07-15 | Stop reason: DRUGHIGH

## 2023-07-15 RX ORDER — RIVASTIGMINE 9.5 MG/24H
1 PATCH, EXTENDED RELEASE TRANSDERMAL DAILY
Status: DISCONTINUED | OUTPATIENT
Start: 2023-07-15 | End: 2023-07-30 | Stop reason: HOSPADM

## 2023-07-15 RX ORDER — ETOMIDATE 2 MG/ML
20 INJECTION INTRAVENOUS ONCE
Status: COMPLETED | OUTPATIENT
Start: 2023-07-15 | End: 2023-07-15

## 2023-07-15 RX ORDER — PHENOBARBITAL SODIUM 65 MG/ML
260 INJECTION INTRAMUSCULAR ONCE
Status: COMPLETED | OUTPATIENT
Start: 2023-07-15 | End: 2023-07-15

## 2023-07-15 RX ORDER — ASPIRIN 81 MG/1
81 TABLET ORAL DAILY
Status: DISCONTINUED | OUTPATIENT
Start: 2023-07-15 | End: 2023-07-30 | Stop reason: HOSPADM

## 2023-07-15 RX ORDER — HEPARIN SODIUM 1000 [USP'U]/ML
80 INJECTION, SOLUTION INTRAVENOUS; SUBCUTANEOUS PRN
Status: DISCONTINUED | OUTPATIENT
Start: 2023-07-15 | End: 2023-07-15 | Stop reason: DRUGHIGH

## 2023-07-15 RX ORDER — PHENOBARBITAL SODIUM 65 MG/ML
65 INJECTION INTRAMUSCULAR 2 TIMES DAILY
Status: DISCONTINUED | OUTPATIENT
Start: 2023-07-15 | End: 2023-07-17

## 2023-07-15 RX ORDER — SODIUM CHLORIDE 0.9 % (FLUSH) 0.9 %
10 SYRINGE (ML) INJECTION EVERY 12 HOURS SCHEDULED
Status: DISCONTINUED | OUTPATIENT
Start: 2023-07-15 | End: 2023-07-30 | Stop reason: HOSPADM

## 2023-07-15 RX ORDER — SUCCINYLCHOLINE CHLORIDE 20 MG/ML
100 INJECTION INTRAMUSCULAR; INTRAVENOUS ONCE
Status: COMPLETED | OUTPATIENT
Start: 2023-07-15 | End: 2023-07-15

## 2023-07-15 RX ORDER — PROMETHAZINE HYDROCHLORIDE 12.5 MG/1
12.5 TABLET ORAL EVERY 6 HOURS PRN
Status: DISCONTINUED | OUTPATIENT
Start: 2023-07-15 | End: 2023-07-30 | Stop reason: HOSPADM

## 2023-07-15 RX ORDER — TAMSULOSIN HYDROCHLORIDE 0.4 MG/1
0.4 CAPSULE ORAL EVERY MORNING
Status: DISCONTINUED | OUTPATIENT
Start: 2023-07-15 | End: 2023-07-30 | Stop reason: HOSPADM

## 2023-07-15 RX ORDER — SODIUM CHLORIDE 9 MG/ML
INJECTION, SOLUTION INTRAVENOUS PRN
Status: DISCONTINUED | OUTPATIENT
Start: 2023-07-15 | End: 2023-07-30 | Stop reason: HOSPADM

## 2023-07-15 RX ADMIN — SUCCINYLCHOLINE CHLORIDE 100 MG: 20 INJECTION INTRAMUSCULAR; INTRAVENOUS at 14:18

## 2023-07-15 RX ADMIN — THIAMINE HYDROCHLORIDE 100 MG: 100 INJECTION, SOLUTION INTRAMUSCULAR; INTRAVENOUS at 08:55

## 2023-07-15 RX ADMIN — POTASSIUM CHLORIDE 10 MEQ: 7.46 INJECTION, SOLUTION INTRAVENOUS at 17:26

## 2023-07-15 RX ADMIN — LEVETIRACETAM 250 MG: 500 TABLET, FILM COATED ORAL at 08:50

## 2023-07-15 RX ADMIN — RIFAXIMIN 600 MG: 200 TABLET ORAL at 20:28

## 2023-07-15 RX ADMIN — CEFEPIME 2000 MG: 2 INJECTION, POWDER, FOR SOLUTION INTRAVENOUS at 22:26

## 2023-07-15 RX ADMIN — HEPARIN SODIUM 4000 UNITS: 1000 INJECTION INTRAVENOUS; SUBCUTANEOUS at 16:57

## 2023-07-15 RX ADMIN — CEFEPIME 2000 MG: 2 INJECTION, POWDER, FOR SOLUTION INTRAVENOUS at 16:52

## 2023-07-15 RX ADMIN — METOPROLOL SUCCINATE 25 MG: 25 TABLET, EXTENDED RELEASE ORAL at 08:51

## 2023-07-15 RX ADMIN — Medication 10 ML: at 08:56

## 2023-07-15 RX ADMIN — HEPARIN SODIUM 10 UNITS/KG/HR: 10000 INJECTION, SOLUTION INTRAVENOUS at 17:00

## 2023-07-15 RX ADMIN — MAGNESIUM SULFATE HEPTAHYDRATE 3000 MG: 500 INJECTION, SOLUTION INTRAMUSCULAR; INTRAVENOUS at 09:26

## 2023-07-15 RX ADMIN — VALPROATE SODIUM 250 MG: 100 INJECTION, SOLUTION INTRAVENOUS at 22:36

## 2023-07-15 RX ADMIN — IPRATROPIUM BROMIDE AND ALBUTEROL SULFATE 1 DOSE: .5; 2.5 SOLUTION RESPIRATORY (INHALATION) at 16:00

## 2023-07-15 RX ADMIN — LORAZEPAM 4 MG: 1 TABLET ORAL at 04:25

## 2023-07-15 RX ADMIN — LORAZEPAM 2 MG: 2 INJECTION INTRAMUSCULAR; INTRAVENOUS at 08:03

## 2023-07-15 RX ADMIN — ETOMIDATE 20 MG: 2 INJECTION, SOLUTION INTRAVENOUS at 14:17

## 2023-07-15 RX ADMIN — SUCRALFATE 1 G: 1 TABLET ORAL at 07:38

## 2023-07-15 RX ADMIN — SODIUM CHLORIDE, PRESERVATIVE FREE 10 ML: 5 INJECTION INTRAVENOUS at 20:45

## 2023-07-15 RX ADMIN — FOLIC ACID 1 MG: 1 TABLET ORAL at 08:51

## 2023-07-15 RX ADMIN — FUROSEMIDE 40 MG: 10 INJECTION, SOLUTION INTRAMUSCULAR; INTRAVENOUS at 17:04

## 2023-07-15 RX ADMIN — SUCRALFATE 1 G: 1 TABLET ORAL at 20:29

## 2023-07-15 RX ADMIN — LEVETIRACETAM 500 MG: 100 INJECTION INTRAVENOUS at 17:04

## 2023-07-15 RX ADMIN — POTASSIUM CHLORIDE 20 MEQ: 1500 TABLET, EXTENDED RELEASE ORAL at 08:51

## 2023-07-15 RX ADMIN — POTASSIUM CHLORIDE 10 MEQ: 7.46 INJECTION, SOLUTION INTRAVENOUS at 18:43

## 2023-07-15 RX ADMIN — TAMSULOSIN HYDROCHLORIDE 0.4 MG: 0.4 CAPSULE ORAL at 08:51

## 2023-07-15 RX ADMIN — HALOPERIDOL 5 MG: 5 TABLET ORAL at 20:29

## 2023-07-15 RX ADMIN — PHENOBARBITAL SODIUM 65 MG: 65 INJECTION INTRAMUSCULAR at 20:28

## 2023-07-15 RX ADMIN — ETOMIDATE 20 MG: 2 INJECTION INTRAVENOUS at 14:17

## 2023-07-15 RX ADMIN — POTASSIUM CHLORIDE 10 MEQ: 7.46 INJECTION, SOLUTION INTRAVENOUS at 16:20

## 2023-07-15 RX ADMIN — IPRATROPIUM BROMIDE AND ALBUTEROL SULFATE 1 DOSE: .5; 2.5 SOLUTION RESPIRATORY (INHALATION) at 20:11

## 2023-07-15 RX ADMIN — ROSUVASTATIN 10 MG: 10 TABLET, FILM COATED ORAL at 08:49

## 2023-07-15 RX ADMIN — Medication 2 MG/HR: at 15:14

## 2023-07-15 RX ADMIN — VANCOMYCIN HYDROCHLORIDE 1500 MG: 10 INJECTION, POWDER, LYOPHILIZED, FOR SOLUTION INTRAVENOUS at 17:23

## 2023-07-15 RX ADMIN — SUCRALFATE 1 G: 1 TABLET ORAL at 01:35

## 2023-07-15 RX ADMIN — MIRTAZAPINE 15 MG: 15 TABLET, FILM COATED ORAL at 20:29

## 2023-07-15 RX ADMIN — POTASSIUM CHLORIDE 10 MEQ: 7.46 INJECTION, SOLUTION INTRAVENOUS at 15:29

## 2023-07-15 RX ADMIN — PHENOBARBITAL SODIUM 260 MG: 65 INJECTION INTRAMUSCULAR at 05:13

## 2023-07-15 RX ADMIN — PROPOFOL 100 MG: 10 INJECTION, EMULSION INTRAVENOUS at 14:17

## 2023-07-15 RX ADMIN — Medication 10 ML: at 20:44

## 2023-07-15 RX ADMIN — SUCCINYLCHOLINE CHLORIDE 100 MG: 20 INJECTION, SOLUTION INTRAMUSCULAR; INTRAVENOUS at 14:18

## 2023-07-15 RX ADMIN — ACETYLCYSTEINE 600 MG: 100 SOLUTION ORAL; RESPIRATORY (INHALATION) at 20:11

## 2023-07-15 RX ADMIN — HALOPERIDOL 5 MG: 5 TABLET ORAL at 01:35

## 2023-07-15 RX ADMIN — Medication 50 MCG/HR: at 15:11

## 2023-07-15 RX ADMIN — LORAZEPAM 1 MG: 1 TABLET ORAL at 00:03

## 2023-07-15 RX ADMIN — LORAZEPAM 3 MG: 1 TABLET ORAL at 03:00

## 2023-07-15 RX ADMIN — ACETYLCYSTEINE 600 MG: 100 SOLUTION ORAL; RESPIRATORY (INHALATION) at 16:00

## 2023-07-15 RX ADMIN — PANTOPRAZOLE SODIUM 40 MG: 40 TABLET, DELAYED RELEASE ORAL at 07:38

## 2023-07-15 RX ADMIN — LORAZEPAM 3 MG: 1 TABLET ORAL at 01:35

## 2023-07-15 RX ADMIN — ASPIRIN 81 MG: 81 TABLET, COATED ORAL at 08:51

## 2023-07-15 RX ADMIN — SODIUM CHLORIDE, PRESERVATIVE FREE 10 ML: 5 INJECTION INTRAVENOUS at 08:55

## 2023-07-15 RX ADMIN — SODIUM CHLORIDE, PRESERVATIVE FREE 10 ML: 5 INJECTION INTRAVENOUS at 08:56

## 2023-07-15 RX ADMIN — MIRTAZAPINE 15 MG: 15 TABLET, FILM COATED ORAL at 01:35

## 2023-07-15 ASSESSMENT — PAIN SCALES - GENERAL
PAINLEVEL_OUTOF10: 0

## 2023-07-15 ASSESSMENT — PULMONARY FUNCTION TESTS
PIF_VALUE: 27
PIF_VALUE: 28
PIF_VALUE: 25
PIF_VALUE: 31
PIF_VALUE: 25
PIF_VALUE: 29
PIF_VALUE: 25
PIF_VALUE: 28
PIF_VALUE: 25
PIF_VALUE: 25

## 2023-07-15 NOTE — ED NOTES
Patient with run of v tach on monitor and tachypnic.   Dr. Tutu Blackwood and Dr. Seng Young notified      Lucille Barrera RN  07/15/23 [id number]

## 2023-07-15 NOTE — ED NOTES
Patient found sitting at edge of his bed with nasal cannula out of his nose. Patient repositioned back into the bed and placed back on O2.   Bed alarm on     Jaime Russo RN  07/15/23 0704

## 2023-07-15 NOTE — H&P
Hospitalist History & Physical      PCP: JOE Glynn    Date of Service: Pt seen/examined on 7/14/2023    Chief Complaint:  had concerns including Alcohol Problem (Patient states he is going through alcohol withdrawal and had 2 seizures and fall today . +hit head +blood thinners . Last drink this am). History Of Present Illness:    Mr. Theresa Scruggs, a 63y.o. year old male  who  has a past medical history of Alcoholic liver disease ([address]), H/O prosthetic aortic valve replacement, History of mitral valve replacement, Stroke [facility]), and Testicular cancer ([address]). Patient presented to the emergency department with concerns about alcohol withdrawal.  Patient states going through withdrawal and had 2 seizures earlier today. He also fell and hit his head. Last drink was this morning. Patient states that he drinks 4-5 tall beers daily. Vital signs are within normal limits and stable. Laboratory studies demonstrate lactic acid 2.3, troponin 72, alk phos 157, hemoglobin 9.3. INR was greater than 10. Patient is on warfarin for valvular heart disease. INR was reversed with Kcentra and vitamin K. Patient was placed on CIWA protocol with Ativan dosing. Medicine was consulted for admission.       Past Medical History:   Diagnosis Date    Alcoholic liver disease ([address])     H/O prosthetic aortic valve replacement 09/2020    [facility] - main  Mitral and pacemaker at this time as well    History of mitral valve replacement 09/2020    at [facility] - main  Aortic Valve placed at this time as well as pacemaker    Stroke [facility])     Testicular cancer ([address])     in remission since 1988       Past Surgical History:   Procedure Laterality Date    COLONOSCOPY N/A 8/24/2022    COLONOSCOPY POLYPECTOMY HOT BIOPSY performed by Errol Mobley MD at [facility] 4/56/1517     PACEMAKER PLACEMENT  09/2020    UPPER GASTROINTESTINAL ENDOSCOPY N/A 8/24/2022    EGD

## 2023-07-15 NOTE — ED NOTES
Is notified by nursing staff the patient's heart rate was in the 120s. Patient does have pacer. EKG please see below noted. Patient was given phenobarbital due to his alcohol withdrawal.  Patient does respond to his name. Patient vital signs other than tachycardia are stable. Patient appears to be slightly agitated in the emergency department. EKG: This EKG is signed and interpreted by me.     Rate: 123  Rhythm: Paced rhythm  Interpretation: no acute changes  Comparison: stable as compared to patient's most recent EKG 5/23/23 and 7/14/23       Violette Frances MD  07/15/23 5438

## 2023-07-15 NOTE — ED NOTES
Patient found again at edge of his bed. Continuing to attempt to get out of the bed without assistance after being directed not to do so. Patient moved back into the bed and connected to monitor and O2. Bed alarm on. Charge nurse notified. Sitter for patient requested.      Emiliano Moscoso RN  07/15/23 0977

## 2023-07-15 NOTE — ED NOTES
Patient becoming restless. Attempting to climb out of bed. Ripping off monitor cords. Bed alarm on.      Ny Rosario RN  07/15/23 2056

## 2023-07-16 ENCOUNTER — APPOINTMENT (OUTPATIENT)
Dept: GENERAL RADIOLOGY | Age: 58
End: 2023-07-16
Payer: COMMERCIAL

## 2023-07-16 LAB
AADO2: 272 MMHG
ALBUMIN SERPL-MCNC: 3.1 G/DL (ref 3.5–5.2)
ALP SERPL-CCNC: 129 U/L (ref 40–129)
ALT SERPL-CCNC: 15 U/L (ref 0–40)
ANION GAP SERPL CALCULATED.3IONS-SCNC: 9 MMOL/L (ref 7–16)
ANION GAP SERPL CALCULATED.3IONS-SCNC: 9 MMOL/L (ref 7–16)
AST SERPL-CCNC: 23 U/L (ref 0–39)
B.E.: 2.2 MMOL/L (ref -3–3)
BASOPHILS # BLD: 0.03 K/UL (ref 0–0.2)
BASOPHILS # BLD: 0.05 K/UL (ref 0–0.2)
BASOPHILS # BLD: 0.1 K/UL (ref 0–0.2)
BASOPHILS NFR BLD: 1 % (ref 0–2)
BASOPHILS NFR BLD: 1 % (ref 0–2)
BASOPHILS NFR BLD: 2 % (ref 0–2)
BILIRUB SERPL-MCNC: 1 MG/DL (ref 0–1.2)
BUN SERPL-MCNC: 14 MG/DL (ref 6–20)
BUN SERPL-MCNC: 17 MG/DL (ref 6–20)
CALCIUM SERPL-MCNC: 7.9 MG/DL (ref 8.6–10.2)
CALCIUM SERPL-MCNC: 8.2 MG/DL (ref 8.6–10.2)
CHLORIDE SERPL-SCNC: 107 MMOL/L (ref 98–107)
CHLORIDE SERPL-SCNC: 108 MMOL/L (ref 98–107)
CO2 SERPL-SCNC: 25 MMOL/L (ref 22–29)
CO2 SERPL-SCNC: 25 MMOL/L (ref 22–29)
COHB: 1.4 % (ref 0–1.5)
CREAT SERPL-MCNC: 1.7 MG/DL (ref 0.7–1.2)
CREAT SERPL-MCNC: 2.1 MG/DL (ref 0.7–1.2)
CRITICAL: ABNORMAL
DATE ANALYZED: ABNORMAL
DATE OF COLLECTION: ABNORMAL
EOSINOPHIL # BLD: 0.1 K/UL (ref 0.05–0.5)
EOSINOPHIL # BLD: 0.1 K/UL (ref 0.05–0.5)
EOSINOPHIL # BLD: 0.12 K/UL (ref 0.05–0.5)
EOSINOPHILS RELATIVE PERCENT: 2 % (ref 0–6)
ERYTHROCYTE [DISTWIDTH] IN BLOOD BY AUTOMATED COUNT: 18 % (ref 11.5–15)
ERYTHROCYTE [DISTWIDTH] IN BLOOD BY AUTOMATED COUNT: 18.4 % (ref 11.5–15)
ERYTHROCYTE [DISTWIDTH] IN BLOOD BY AUTOMATED COUNT: 18.6 % (ref 11.5–15)
FIO2: 75 %
GFR SERPL CREATININE-BSD FRML MDRD: 35 ML/MIN/1.73M2
GFR SERPL CREATININE-BSD FRML MDRD: 47 ML/MIN/1.73M2
GLUCOSE SERPL-MCNC: 88 MG/DL (ref 74–99)
GLUCOSE SERPL-MCNC: 93 MG/DL (ref 74–99)
HCO3: 26.2 MMOL/L (ref 22–26)
HCT VFR BLD AUTO: 27.8 % (ref 37–54)
HCT VFR BLD AUTO: 29 % (ref 37–54)
HCT VFR BLD AUTO: 29.1 % (ref 37–54)
HGB BLD-MCNC: 8 G/DL (ref 12.5–16.5)
HGB BLD-MCNC: 8.1 G/DL (ref 12.5–16.5)
HGB BLD-MCNC: 8.1 G/DL (ref 12.5–16.5)
HHB: 0.4 % (ref 0–5)
IMM GRANULOCYTES # BLD AUTO: <0.03 K/UL (ref 0–0.58)
IMM GRANULOCYTES NFR BLD: 0 % (ref 0–5)
INR PPP: 1.6
LAB: ABNORMAL
LYMPHOCYTES # BLD: 15 % (ref 20–42)
LYMPHOCYTES # BLD: 17 % (ref 20–42)
LYMPHOCYTES # BLD: 20 % (ref 20–42)
LYMPHOCYTES NFR BLD: 0.82 K/UL (ref 1.5–4)
LYMPHOCYTES NFR BLD: 0.93 K/UL (ref 1.5–4)
LYMPHOCYTES NFR BLD: 1 K/UL (ref 1.5–4)
Lab: ABNORMAL
MAGNESIUM SERPL-MCNC: 1.8 MG/DL (ref 1.6–2.6)
MCH RBC QN AUTO: 24.5 PG (ref 26–35)
MCH RBC QN AUTO: 24.5 PG (ref 26–35)
MCH RBC QN AUTO: 24.6 PG (ref 26–35)
MCHC RBC AUTO-ENTMCNC: 27.8 G/DL (ref 32–34.5)
MCHC RBC AUTO-ENTMCNC: 27.9 G/DL (ref 32–34.5)
MCHC RBC AUTO-ENTMCNC: 28.8 G/DL (ref 32–34.5)
MCV RBC AUTO: 85.3 FL (ref 80–99.9)
MCV RBC AUTO: 88.1 FL (ref 80–99.9)
MCV RBC AUTO: 88.2 FL (ref 80–99.9)
METHB: 0.5 % (ref 0–1.5)
MODE: AC
MONOCYTES NFR BLD: 0.05 K/UL (ref 0.1–0.95)
MONOCYTES NFR BLD: 0.24 K/UL (ref 0.1–0.95)
MONOCYTES NFR BLD: 0.54 K/UL (ref 0.1–0.95)
MONOCYTES NFR BLD: 1 % (ref 2–12)
MONOCYTES NFR BLD: 11 % (ref 2–12)
MONOCYTES NFR BLD: 4 % (ref 2–12)
NEUTROPHILS NFR BLD: 65 % (ref 43–80)
NEUTROPHILS NFR BLD: 77 % (ref 43–80)
NEUTROPHILS NFR BLD: 80 % (ref 43–80)
NEUTS SEG NFR BLD: 3.19 K/UL (ref 1.8–7.3)
NEUTS SEG NFR BLD: 4.25 K/UL (ref 1.8–7.3)
NEUTS SEG NFR BLD: 4.47 K/UL (ref 1.8–7.3)
NUCLEATED RED BLOOD CELLS: 1 PER 100 WBC
NUCLEATED RED BLOOD CELLS: 1 PER 100 WBC
O2 CONTENT: 13.4 ML/DL
O2 SATURATION: 99.6 % (ref 92–98.5)
O2HB: 97.7 % (ref 94–97)
OPERATOR ID: 2593
PARTIAL THROMBOPLASTIN TIME: 29.7 SEC (ref 24.5–35.1)
PARTIAL THROMBOPLASTIN TIME: 30.9 SEC (ref 24.5–35.1)
PARTIAL THROMBOPLASTIN TIME: 41.4 SEC (ref 24.5–35.1)
PARTIAL THROMBOPLASTIN TIME: 48.9 SEC (ref 24.5–35.1)
PATIENT TEMP: 37 C
PCO2: 38.6 MMHG (ref 35–45)
PEEP/CPAP: 5 CMH2O
PFO2: 2.71 MMHG/%
PH BLOOD GAS: 7.45 (ref 7.35–7.45)
PHOSPHATE SERPL-MCNC: 2.6 MG/DL (ref 2.5–4.5)
PHOSPHATE SERPL-MCNC: 3.1 MG/DL (ref 2.5–4.5)
PLATELET # BLD AUTO: 113 K/UL (ref 130–450)
PLATELET # BLD AUTO: 118 K/UL (ref 130–450)
PLATELET # BLD AUTO: 128 K/UL (ref 130–450)
PMV BLD AUTO: 11.7 FL (ref 7–12)
PMV BLD AUTO: 11.8 FL (ref 7–12)
PMV BLD AUTO: 11.8 FL (ref 7–12)
PO2: 203 MMHG (ref 75–100)
POTASSIUM SERPL-SCNC: 4 MMOL/L (ref 3.5–5)
POTASSIUM SERPL-SCNC: 4.1 MMOL/L (ref 3.5–5)
PROT SERPL-MCNC: 5.8 G/DL (ref 6.4–8.3)
PROTHROMBIN TIME: 17.1 SEC (ref 9.3–12.4)
RBC # BLD AUTO: 3.26 M/UL (ref 3.8–5.8)
RBC # BLD AUTO: 3.29 M/UL (ref 3.8–5.8)
RBC # BLD AUTO: 3.3 M/UL (ref 3.8–5.8)
RBC # BLD: ABNORMAL 10*6/UL
RI(T): 1.34
RR MECHANICAL: 18 B/MIN
SODIUM SERPL-SCNC: 141 MMOL/L (ref 132–146)
SODIUM SERPL-SCNC: 142 MMOL/L (ref 132–146)
SOURCE, BLOOD GAS: ABNORMAL
THB: 9.4 G/DL (ref 11.5–16.5)
TIME ANALYZED: 419
VT MECHANICAL: 480 ML
WBC OTHER # BLD: 4.9 K/UL (ref 4.5–11.5)
WBC OTHER # BLD: 5.5 K/UL (ref 4.5–11.5)
WBC OTHER # BLD: 5.6 K/UL (ref 4.5–11.5)

## 2023-07-16 PROCEDURE — 82805 BLOOD GASES W/O2 SATURATION: CPT

## 2023-07-16 PROCEDURE — A4216 STERILE WATER/SALINE, 10 ML: HCPCS | Performed by: INTERNAL MEDICINE

## 2023-07-16 PROCEDURE — 2580000003 HC RX 258: Performed by: INTERNAL MEDICINE

## 2023-07-16 PROCEDURE — 6360000002 HC RX W HCPCS: Performed by: FAMILY MEDICINE

## 2023-07-16 PROCEDURE — 94669 MECHANICAL CHEST WALL OSCILL: CPT

## 2023-07-16 PROCEDURE — 2580000003 HC RX 258: Performed by: FAMILY MEDICINE

## 2023-07-16 PROCEDURE — 87070 CULTURE OTHR SPECIMN AEROBIC: CPT

## 2023-07-16 PROCEDURE — 71045 X-RAY EXAM CHEST 1 VIEW: CPT

## 2023-07-16 PROCEDURE — 2500000003 HC RX 250 WO HCPCS: Performed by: INTERNAL MEDICINE

## 2023-07-16 PROCEDURE — 6360000002 HC RX W HCPCS: Performed by: INTERNAL MEDICINE

## 2023-07-16 PROCEDURE — 6370000000 HC RX 637 (ALT 250 FOR IP): Performed by: INTERNAL MEDICINE

## 2023-07-16 PROCEDURE — 99291 CRITICAL CARE FIRST HOUR: CPT | Performed by: INTERNAL MEDICINE

## 2023-07-16 PROCEDURE — 94668 MNPJ CHEST WALL SBSQ: CPT

## 2023-07-16 PROCEDURE — 83735 ASSAY OF MAGNESIUM: CPT

## 2023-07-16 PROCEDURE — 87205 SMEAR GRAM STAIN: CPT

## 2023-07-16 PROCEDURE — 84100 ASSAY OF PHOSPHORUS: CPT

## 2023-07-16 PROCEDURE — 6370000000 HC RX 637 (ALT 250 FOR IP): Performed by: FAMILY MEDICINE

## 2023-07-16 PROCEDURE — 85730 THROMBOPLASTIN TIME PARTIAL: CPT

## 2023-07-16 PROCEDURE — 94003 VENT MGMT INPAT SUBQ DAY: CPT

## 2023-07-16 PROCEDURE — 85610 PROTHROMBIN TIME: CPT

## 2023-07-16 PROCEDURE — 85027 COMPLETE CBC AUTOMATED: CPT

## 2023-07-16 PROCEDURE — C9113 INJ PANTOPRAZOLE SODIUM, VIA: HCPCS | Performed by: INTERNAL MEDICINE

## 2023-07-16 PROCEDURE — 80048 BASIC METABOLIC PNL TOTAL CA: CPT

## 2023-07-16 PROCEDURE — 2000000000 HC ICU R&B

## 2023-07-16 PROCEDURE — 51798 US URINE CAPACITY MEASURE: CPT

## 2023-07-16 PROCEDURE — 94640 AIRWAY INHALATION TREATMENT: CPT

## 2023-07-16 PROCEDURE — 80053 COMPREHEN METABOLIC PANEL: CPT

## 2023-07-16 RX ADMIN — SODIUM CHLORIDE, PRESERVATIVE FREE 10 ML: 5 INJECTION INTRAVENOUS at 20:07

## 2023-07-16 RX ADMIN — IPRATROPIUM BROMIDE AND ALBUTEROL SULFATE 1 DOSE: .5; 2.5 SOLUTION RESPIRATORY (INHALATION) at 12:04

## 2023-07-16 RX ADMIN — FOLIC ACID 1 MG: 1 TABLET ORAL at 08:21

## 2023-07-16 RX ADMIN — SERTRALINE 50 MG: 50 TABLET, FILM COATED ORAL at 08:21

## 2023-07-16 RX ADMIN — VANCOMYCIN HYDROCHLORIDE 1500 MG: 10 INJECTION, POWDER, LYOPHILIZED, FOR SOLUTION INTRAVENOUS at 09:33

## 2023-07-16 RX ADMIN — Medication 125 MCG/HR: at 14:38

## 2023-07-16 RX ADMIN — HEPARIN SODIUM 4000 UNITS: 1000 INJECTION INTRAVENOUS; SUBCUTANEOUS at 09:12

## 2023-07-16 RX ADMIN — VALPROATE SODIUM 250 MG: 100 INJECTION, SOLUTION INTRAVENOUS at 15:13

## 2023-07-16 RX ADMIN — FUROSEMIDE 40 MG: 10 INJECTION, SOLUTION INTRAMUSCULAR; INTRAVENOUS at 08:21

## 2023-07-16 RX ADMIN — DULOXETINE HYDROCHLORIDE 30 MG: 30 CAPSULE, DELAYED RELEASE ORAL at 08:35

## 2023-07-16 RX ADMIN — Medication 4 MG/HR: at 16:49

## 2023-07-16 RX ADMIN — SODIUM CHLORIDE, PRESERVATIVE FREE 10 ML: 5 INJECTION INTRAVENOUS at 08:22

## 2023-07-16 RX ADMIN — ROSUVASTATIN 10 MG: 10 TABLET, FILM COATED ORAL at 08:21

## 2023-07-16 RX ADMIN — SUCRALFATE 1 G: 1 TABLET ORAL at 11:12

## 2023-07-16 RX ADMIN — HEPARIN SODIUM 18 UNITS/KG/HR: 10000 INJECTION, SOLUTION INTRAVENOUS at 15:43

## 2023-07-16 RX ADMIN — IPRATROPIUM BROMIDE AND ALBUTEROL SULFATE 1 DOSE: .5; 2.5 SOLUTION RESPIRATORY (INHALATION) at 04:33

## 2023-07-16 RX ADMIN — CEFEPIME 2000 MG: 2 INJECTION, POWDER, FOR SOLUTION INTRAVENOUS at 23:16

## 2023-07-16 RX ADMIN — SUCRALFATE 1 G: 1 TABLET ORAL at 05:20

## 2023-07-16 RX ADMIN — ACETYLCYSTEINE 600 MG: 100 SOLUTION ORAL; RESPIRATORY (INHALATION) at 15:53

## 2023-07-16 RX ADMIN — HEPARIN SODIUM 16 UNITS/KG/HR: 10000 INJECTION, SOLUTION INTRAVENOUS at 11:11

## 2023-07-16 RX ADMIN — CEFEPIME 2000 MG: 2 INJECTION, POWDER, FOR SOLUTION INTRAVENOUS at 05:20

## 2023-07-16 RX ADMIN — ASPIRIN 81 MG: 81 TABLET, COATED ORAL at 08:21

## 2023-07-16 RX ADMIN — MIRTAZAPINE 15 MG: 15 TABLET, FILM COATED ORAL at 20:06

## 2023-07-16 RX ADMIN — SUCRALFATE 1 G: 1 TABLET ORAL at 20:06

## 2023-07-16 RX ADMIN — RIFAXIMIN 600 MG: 200 TABLET ORAL at 20:06

## 2023-07-16 RX ADMIN — PHENOBARBITAL SODIUM 65 MG: 65 INJECTION INTRAMUSCULAR at 20:06

## 2023-07-16 RX ADMIN — PHENOBARBITAL SODIUM 65 MG: 65 INJECTION INTRAMUSCULAR at 08:21

## 2023-07-16 RX ADMIN — ACETYLCYSTEINE 600 MG: 100 SOLUTION ORAL; RESPIRATORY (INHALATION) at 19:51

## 2023-07-16 RX ADMIN — VALPROATE SODIUM 250 MG: 100 INJECTION, SOLUTION INTRAVENOUS at 05:34

## 2023-07-16 RX ADMIN — LEVETIRACETAM 500 MG: 100 INJECTION INTRAVENOUS at 02:18

## 2023-07-16 RX ADMIN — CEFEPIME 2000 MG: 2 INJECTION, POWDER, FOR SOLUTION INTRAVENOUS at 16:20

## 2023-07-16 RX ADMIN — VALPROATE SODIUM 250 MG: 100 INJECTION, SOLUTION INTRAVENOUS at 22:21

## 2023-07-16 RX ADMIN — Medication 7 MG/HR: at 02:24

## 2023-07-16 RX ADMIN — HEPARIN SODIUM 2000 UNITS: 1000 INJECTION INTRAVENOUS; SUBCUTANEOUS at 21:27

## 2023-07-16 RX ADMIN — LEVETIRACETAM 500 MG: 100 INJECTION INTRAVENOUS at 16:14

## 2023-07-16 RX ADMIN — ACETYLCYSTEINE 600 MG: 100 SOLUTION ORAL; RESPIRATORY (INHALATION) at 04:33

## 2023-07-16 RX ADMIN — RIFAXIMIN 600 MG: 200 TABLET ORAL at 08:35

## 2023-07-16 RX ADMIN — PANTOPRAZOLE SODIUM 40 MG: 40 TABLET, DELAYED RELEASE ORAL at 05:20

## 2023-07-16 RX ADMIN — HALOPERIDOL 5 MG: 5 TABLET ORAL at 20:06

## 2023-07-16 RX ADMIN — ACETYLCYSTEINE 600 MG: 100 SOLUTION ORAL; RESPIRATORY (INHALATION) at 00:10

## 2023-07-16 RX ADMIN — Medication 100 MCG/HR: at 01:35

## 2023-07-16 RX ADMIN — ACETYLCYSTEINE 600 MG: 100 SOLUTION ORAL; RESPIRATORY (INHALATION) at 12:04

## 2023-07-16 RX ADMIN — SODIUM CHLORIDE 40 MG: 9 INJECTION INTRAMUSCULAR; INTRAVENOUS; SUBCUTANEOUS at 16:14

## 2023-07-16 RX ADMIN — HEPARIN SODIUM 2000 UNITS: 1000 INJECTION INTRAVENOUS; SUBCUTANEOUS at 01:09

## 2023-07-16 RX ADMIN — ACETYLCYSTEINE 600 MG: 100 SOLUTION ORAL; RESPIRATORY (INHALATION) at 08:33

## 2023-07-16 RX ADMIN — IPRATROPIUM BROMIDE AND ALBUTEROL SULFATE 1 DOSE: .5; 2.5 SOLUTION RESPIRATORY (INHALATION) at 19:51

## 2023-07-16 RX ADMIN — IPRATROPIUM BROMIDE AND ALBUTEROL SULFATE 1 DOSE: .5; 2.5 SOLUTION RESPIRATORY (INHALATION) at 08:33

## 2023-07-16 RX ADMIN — HEPARIN SODIUM 2000 UNITS: 1000 INJECTION INTRAVENOUS; SUBCUTANEOUS at 15:41

## 2023-07-16 RX ADMIN — IPRATROPIUM BROMIDE AND ALBUTEROL SULFATE 1 DOSE: .5; 2.5 SOLUTION RESPIRATORY (INHALATION) at 15:53

## 2023-07-16 RX ADMIN — RIFAXIMIN 600 MG: 200 TABLET ORAL at 16:22

## 2023-07-16 RX ADMIN — SUCRALFATE 1 G: 1 TABLET ORAL at 16:14

## 2023-07-16 RX ADMIN — TAMSULOSIN HYDROCHLORIDE 0.4 MG: 0.4 CAPSULE ORAL at 08:38

## 2023-07-16 RX ADMIN — THIAMINE HYDROCHLORIDE 100 MG: 100 INJECTION, SOLUTION INTRAMUSCULAR; INTRAVENOUS at 08:21

## 2023-07-16 RX ADMIN — Medication 10 ML: at 20:07

## 2023-07-16 RX ADMIN — IPRATROPIUM BROMIDE AND ALBUTEROL SULFATE 1 DOSE: .5; 2.5 SOLUTION RESPIRATORY (INHALATION) at 00:10

## 2023-07-16 ASSESSMENT — PULMONARY FUNCTION TESTS
PIF_VALUE: 25
PIF_VALUE: 26
PIF_VALUE: 30
PIF_VALUE: 30
PIF_VALUE: 27
PIF_VALUE: 32
PIF_VALUE: 29
PIF_VALUE: 31
PIF_VALUE: 25
PIF_VALUE: 26
PIF_VALUE: 25
PIF_VALUE: 27
PIF_VALUE: 25
PIF_VALUE: 27
PIF_VALUE: 25
PIF_VALUE: 26
PIF_VALUE: 40
PIF_VALUE: 28
PIF_VALUE: 50
PIF_VALUE: 37
PIF_VALUE: 31
PIF_VALUE: 28
PIF_VALUE: 27
PIF_VALUE: 27
PIF_VALUE: 25
PIF_VALUE: 28

## 2023-07-16 ASSESSMENT — PAIN SCALES - GENERAL
PAINLEVEL_OUTOF10: 0

## 2023-07-17 ENCOUNTER — APPOINTMENT (OUTPATIENT)
Dept: GENERAL RADIOLOGY | Age: 58
End: 2023-07-17
Payer: COMMERCIAL

## 2023-07-17 ENCOUNTER — APPOINTMENT (OUTPATIENT)
Dept: NEUROLOGY | Age: 58
End: 2023-07-17
Payer: COMMERCIAL

## 2023-07-17 PROBLEM — D64.9 ANEMIA: Status: ACTIVE | Noted: 2023-07-17

## 2023-07-17 LAB
AADO2: 160.1 MMHG
ALBUMIN SERPL-MCNC: 2.8 G/DL (ref 3.5–5.2)
ALP SERPL-CCNC: 120 U/L (ref 40–129)
ALT SERPL-CCNC: 11 U/L (ref 0–40)
ANION GAP SERPL CALCULATED.3IONS-SCNC: 10 MMOL/L (ref 7–16)
ANION GAP SERPL CALCULATED.3IONS-SCNC: 13 MMOL/L (ref 7–16)
AST SERPL-CCNC: 14 U/L (ref 0–39)
B.E.: -2 MMOL/L (ref -3–3)
BASOPHILS # BLD: 0 K/UL (ref 0–0.2)
BASOPHILS # BLD: 0.04 K/UL (ref 0–0.2)
BASOPHILS # BLD: 0.05 K/UL (ref 0–0.2)
BASOPHILS NFR BLD: 0 % (ref 0–2)
BASOPHILS NFR BLD: 1 % (ref 0–2)
BASOPHILS NFR BLD: 1 % (ref 0–2)
BILIRUB SERPL-MCNC: 0.9 MG/DL (ref 0–1.2)
BNP SERPL-MCNC: 823 PG/ML (ref 0–125)
BUN SERPL-MCNC: 17 MG/DL (ref 6–20)
BUN SERPL-MCNC: 19 MG/DL (ref 6–20)
CALCIUM SERPL-MCNC: 8.2 MG/DL (ref 8.6–10.2)
CALCIUM SERPL-MCNC: 8.4 MG/DL (ref 8.6–10.2)
CHLORIDE SERPL-SCNC: 108 MMOL/L (ref 98–107)
CHLORIDE SERPL-SCNC: 109 MMOL/L (ref 98–107)
CO2 SERPL-SCNC: 21 MMOL/L (ref 22–29)
CO2 SERPL-SCNC: 24 MMOL/L (ref 22–29)
COHB: 1.4 % (ref 0–1.5)
CREAT SERPL-MCNC: 2.1 MG/DL (ref 0.7–1.2)
CREAT SERPL-MCNC: 2.4 MG/DL (ref 0.7–1.2)
CRITICAL: ABNORMAL
DATE ANALYZED: ABNORMAL
DATE OF COLLECTION: ABNORMAL
EKG ATRIAL RATE: 92 BPM
EKG P AXIS: 100 DEGREES
EKG P-R INTERVAL: 206 MS
EKG Q-T INTERVAL: 438 MS
EKG QRS DURATION: 158 MS
EKG QTC CALCULATION (BAZETT): 541 MS
EKG R AXIS: -42 DEGREES
EKG T AXIS: 121 DEGREES
EKG VENTRICULAR RATE: 92 BPM
EOSINOPHIL # BLD: 0.09 K/UL (ref 0.05–0.5)
EOSINOPHIL # BLD: 0.26 K/UL (ref 0.05–0.5)
EOSINOPHIL # BLD: 0.26 K/UL (ref 0.05–0.5)
EOSINOPHILS RELATIVE PERCENT: 2 % (ref 0–6)
EOSINOPHILS RELATIVE PERCENT: 4 % (ref 0–6)
EOSINOPHILS RELATIVE PERCENT: 4 % (ref 0–6)
ERYTHROCYTE [DISTWIDTH] IN BLOOD BY AUTOMATED COUNT: 18.5 % (ref 11.5–15)
ERYTHROCYTE [DISTWIDTH] IN BLOOD BY AUTOMATED COUNT: 18.5 % (ref 11.5–15)
ERYTHROCYTE [DISTWIDTH] IN BLOOD BY AUTOMATED COUNT: 18.7 % (ref 11.5–15)
FIO2: 40 %
GFR SERPL CREATININE-BSD FRML MDRD: 31 ML/MIN/1.73M2
GFR SERPL CREATININE-BSD FRML MDRD: 36 ML/MIN/1.73M2
GLUCOSE SERPL-MCNC: 103 MG/DL (ref 74–99)
GLUCOSE SERPL-MCNC: 96 MG/DL (ref 74–99)
HCO3: 23.7 MMOL/L (ref 22–26)
HCT VFR BLD AUTO: 27.5 % (ref 37–54)
HCT VFR BLD AUTO: 28.7 % (ref 37–54)
HCT VFR BLD AUTO: 29.6 % (ref 37–54)
HGB BLD-MCNC: 7.5 G/DL (ref 12.5–16.5)
HGB BLD-MCNC: 8.1 G/DL (ref 12.5–16.5)
HGB BLD-MCNC: 8.6 G/DL (ref 12.5–16.5)
HHB: 11.3 % (ref 0–5)
IMM GRANULOCYTES # BLD AUTO: 0.03 K/UL (ref 0–0.58)
IMM GRANULOCYTES NFR BLD: 0 % (ref 0–5)
INR PPP: 2.2
LAB: ABNORMAL
LV EF: 50 %
LVEF MODALITY: NORMAL
LYMPHOCYTES # BLD: 11 % (ref 20–42)
LYMPHOCYTES # BLD: 11 % (ref 20–42)
LYMPHOCYTES # BLD: 8 % (ref 20–42)
LYMPHOCYTES NFR BLD: 0.39 K/UL (ref 1.5–4)
LYMPHOCYTES NFR BLD: 0.74 K/UL (ref 1.5–4)
LYMPHOCYTES NFR BLD: 0.77 K/UL (ref 1.5–4)
Lab: ABNORMAL
MAGNESIUM SERPL-MCNC: 1.8 MG/DL (ref 1.6–2.6)
MCH RBC QN AUTO: 24.2 PG (ref 26–35)
MCH RBC QN AUTO: 24.6 PG (ref 26–35)
MCH RBC QN AUTO: 24.7 PG (ref 26–35)
MCHC RBC AUTO-ENTMCNC: 27.3 G/DL (ref 32–34.5)
MCHC RBC AUTO-ENTMCNC: 28.2 G/DL (ref 32–34.5)
MCHC RBC AUTO-ENTMCNC: 29.1 G/DL (ref 32–34.5)
MCV RBC AUTO: 84.6 FL (ref 80–99.9)
MCV RBC AUTO: 87.5 FL (ref 80–99.9)
MCV RBC AUTO: 88.7 FL (ref 80–99.9)
METHB: 0.3 % (ref 0–1.5)
MICROORGANISM SPEC CULT: ABNORMAL
MICROORGANISM/AGENT SPEC: ABNORMAL
MODE: AC
MONOCYTES NFR BLD: 0.22 K/UL (ref 0.1–0.95)
MONOCYTES NFR BLD: 0.56 K/UL (ref 0.1–0.95)
MONOCYTES NFR BLD: 0.66 K/UL (ref 0.1–0.95)
MONOCYTES NFR BLD: 10 % (ref 2–12)
MONOCYTES NFR BLD: 4 % (ref 2–12)
MONOCYTES NFR BLD: 8 % (ref 2–12)
NEUTROPHILS NFR BLD: 74 % (ref 43–80)
NEUTROPHILS NFR BLD: 76 % (ref 43–80)
NEUTROPHILS NFR BLD: 86 % (ref 43–80)
NEUTS SEG NFR BLD: 4.3 K/UL (ref 1.8–7.3)
NEUTS SEG NFR BLD: 4.87 K/UL (ref 1.8–7.3)
NEUTS SEG NFR BLD: 5.13 K/UL (ref 1.8–7.3)
O2 CONTENT: 12.7 ML/DL
O2 SATURATION: 88.5 % (ref 92–98.5)
O2HB: 87 % (ref 94–97)
OPERATOR ID: 2593
PARTIAL THROMBOPLASTIN TIME: 111 SEC (ref 24.5–35.1)
PARTIAL THROMBOPLASTIN TIME: 121.3 SEC (ref 24.5–35.1)
PARTIAL THROMBOPLASTIN TIME: 49.6 SEC (ref 24.5–35.1)
PARTIAL THROMBOPLASTIN TIME: 85.2 SEC (ref 24.5–35.1)
PARTIAL THROMBOPLASTIN TIME: 89.7 SEC (ref 24.5–35.1)
PATIENT TEMP: 37 C
PCO2: 44.8 MMHG (ref 35–45)
PEEP/CPAP: 5 CMH2O
PFO2: 1.59 MMHG/%
PH BLOOD GAS: 7.34 (ref 7.35–7.45)
PHOSPHATE SERPL-MCNC: 2.9 MG/DL (ref 2.5–4.5)
PLATELET # BLD AUTO: 119 K/UL (ref 130–450)
PLATELET # BLD AUTO: 90 K/UL (ref 130–450)
PLATELET CONFIRMATION: NORMAL
PLATELET, FLUORESCENCE: 114 K/UL (ref 130–450)
PMV BLD AUTO: 10.8 FL (ref 7–12)
PMV BLD AUTO: 11.2 FL (ref 7–12)
PMV BLD AUTO: 12.4 FL (ref 7–12)
PO2: 63.6 MMHG (ref 75–100)
POTASSIUM SERPL-SCNC: 3.9 MMOL/L (ref 3.5–5)
POTASSIUM SERPL-SCNC: 4.2 MMOL/L (ref 3.5–5)
PROT SERPL-MCNC: 5.6 G/DL (ref 6.4–8.3)
PROTHROMBIN TIME: 24 SEC (ref 9.3–12.4)
RBC # BLD AUTO: 3.1 M/UL (ref 3.8–5.8)
RBC # BLD AUTO: 3.28 M/UL (ref 3.8–5.8)
RBC # BLD AUTO: 3.5 M/UL (ref 3.8–5.8)
RBC # BLD: ABNORMAL 10*6/UL
RI(T): 2.52
RR MECHANICAL: 18 B/MIN
SODIUM SERPL-SCNC: 142 MMOL/L (ref 132–146)
SODIUM SERPL-SCNC: 143 MMOL/L (ref 132–146)
SOURCE, BLOOD GAS: ABNORMAL
SPECIMEN DESCRIPTION: ABNORMAL
THB: 10.3 G/DL (ref 11.5–16.5)
TIME ANALYZED: 453
VT MECHANICAL: 480 ML
WBC OTHER # BLD: 5 K/UL (ref 4.5–11.5)
WBC OTHER # BLD: 6.6 K/UL (ref 4.5–11.5)
WBC OTHER # BLD: 6.8 K/UL (ref 4.5–11.5)

## 2023-07-17 PROCEDURE — 6370000000 HC RX 637 (ALT 250 FOR IP): Performed by: INTERNAL MEDICINE

## 2023-07-17 PROCEDURE — 2500000003 HC RX 250 WO HCPCS: Performed by: INTERNAL MEDICINE

## 2023-07-17 PROCEDURE — 6360000002 HC RX W HCPCS: Performed by: INTERNAL MEDICINE

## 2023-07-17 PROCEDURE — 71045 X-RAY EXAM CHEST 1 VIEW: CPT

## 2023-07-17 PROCEDURE — P9047 ALBUMIN (HUMAN), 25%, 50ML: HCPCS | Performed by: INTERNAL MEDICINE

## 2023-07-17 PROCEDURE — 80048 BASIC METABOLIC PNL TOTAL CA: CPT

## 2023-07-17 PROCEDURE — 2580000003 HC RX 258: Performed by: INTERNAL MEDICINE

## 2023-07-17 PROCEDURE — 2580000003 HC RX 258: Performed by: FAMILY MEDICINE

## 2023-07-17 PROCEDURE — 95819 EEG AWAKE AND ASLEEP: CPT

## 2023-07-17 PROCEDURE — 93010 ELECTROCARDIOGRAM REPORT: CPT | Performed by: INTERNAL MEDICINE

## 2023-07-17 PROCEDURE — 36415 COLL VENOUS BLD VENIPUNCTURE: CPT

## 2023-07-17 PROCEDURE — 99291 CRITICAL CARE FIRST HOUR: CPT | Performed by: INTERNAL MEDICINE

## 2023-07-17 PROCEDURE — A4216 STERILE WATER/SALINE, 10 ML: HCPCS | Performed by: INTERNAL MEDICINE

## 2023-07-17 PROCEDURE — 99232 SBSQ HOSP IP/OBS MODERATE 35: CPT | Performed by: SURGERY

## 2023-07-17 PROCEDURE — 94640 AIRWAY INHALATION TREATMENT: CPT

## 2023-07-17 PROCEDURE — 93306 TTE W/DOPPLER COMPLETE: CPT

## 2023-07-17 PROCEDURE — 87205 SMEAR GRAM STAIN: CPT

## 2023-07-17 PROCEDURE — 6360000002 HC RX W HCPCS: Performed by: FAMILY MEDICINE

## 2023-07-17 PROCEDURE — 2000000000 HC ICU R&B

## 2023-07-17 PROCEDURE — 80053 COMPREHEN METABOLIC PANEL: CPT

## 2023-07-17 PROCEDURE — 82805 BLOOD GASES W/O2 SATURATION: CPT

## 2023-07-17 PROCEDURE — 83735 ASSAY OF MAGNESIUM: CPT

## 2023-07-17 PROCEDURE — 85610 PROTHROMBIN TIME: CPT

## 2023-07-17 PROCEDURE — 85730 THROMBOPLASTIN TIME PARTIAL: CPT

## 2023-07-17 PROCEDURE — 6360000004 HC RX CONTRAST MEDICATION

## 2023-07-17 PROCEDURE — 6370000000 HC RX 637 (ALT 250 FOR IP): Performed by: FAMILY MEDICINE

## 2023-07-17 PROCEDURE — 94669 MECHANICAL CHEST WALL OSCILL: CPT

## 2023-07-17 PROCEDURE — C9113 INJ PANTOPRAZOLE SODIUM, VIA: HCPCS | Performed by: INTERNAL MEDICINE

## 2023-07-17 PROCEDURE — 94003 VENT MGMT INPAT SUBQ DAY: CPT

## 2023-07-17 PROCEDURE — 84100 ASSAY OF PHOSPHORUS: CPT

## 2023-07-17 PROCEDURE — 85027 COMPLETE CBC AUTOMATED: CPT

## 2023-07-17 PROCEDURE — 83880 ASSAY OF NATRIURETIC PEPTIDE: CPT

## 2023-07-17 PROCEDURE — 87070 CULTURE OTHR SPECIMN AEROBIC: CPT

## 2023-07-17 RX ORDER — ALBUMIN (HUMAN) 12.5 G/50ML
50 SOLUTION INTRAVENOUS EVERY 6 HOURS
Status: COMPLETED | OUTPATIENT
Start: 2023-07-17 | End: 2023-07-17

## 2023-07-17 RX ORDER — HALOPERIDOL 2 MG/1
2 TABLET ORAL NIGHTLY
Status: DISCONTINUED | OUTPATIENT
Start: 2023-07-17 | End: 2023-07-18

## 2023-07-17 RX ORDER — PHENOBARBITAL SODIUM 65 MG/ML
65 INJECTION INTRAMUSCULAR 3 TIMES DAILY
Status: DISCONTINUED | OUTPATIENT
Start: 2023-07-17 | End: 2023-07-18

## 2023-07-17 RX ORDER — PANTOPRAZOLE SODIUM 40 MG/1
40 TABLET, DELAYED RELEASE ORAL
Status: DISCONTINUED | OUTPATIENT
Start: 2023-07-18 | End: 2023-07-17

## 2023-07-17 RX ORDER — MIRTAZAPINE 15 MG/1
7.5 TABLET, FILM COATED ORAL NIGHTLY
Status: DISCONTINUED | OUTPATIENT
Start: 2023-07-17 | End: 2023-07-30 | Stop reason: HOSPADM

## 2023-07-17 RX ORDER — LEVETIRACETAM 500 MG/5ML
1000 INJECTION, SOLUTION, CONCENTRATE INTRAVENOUS EVERY 12 HOURS
Status: DISCONTINUED | OUTPATIENT
Start: 2023-07-17 | End: 2023-07-26

## 2023-07-17 RX ORDER — FUROSEMIDE 10 MG/ML
80 INJECTION INTRAMUSCULAR; INTRAVENOUS DAILY
Status: DISCONTINUED | OUTPATIENT
Start: 2023-07-18 | End: 2023-07-20

## 2023-07-17 RX ORDER — FUROSEMIDE 10 MG/ML
60 INJECTION INTRAMUSCULAR; INTRAVENOUS ONCE
Status: COMPLETED | OUTPATIENT
Start: 2023-07-17 | End: 2023-07-17

## 2023-07-17 RX ADMIN — FUROSEMIDE 40 MG: 10 INJECTION, SOLUTION INTRAMUSCULAR; INTRAVENOUS at 11:02

## 2023-07-17 RX ADMIN — IPRATROPIUM BROMIDE AND ALBUTEROL SULFATE 1 DOSE: .5; 2.5 SOLUTION RESPIRATORY (INHALATION) at 19:24

## 2023-07-17 RX ADMIN — VALPROATE SODIUM 250 MG: 100 INJECTION, SOLUTION INTRAVENOUS at 05:58

## 2023-07-17 RX ADMIN — SERTRALINE 50 MG: 50 TABLET, FILM COATED ORAL at 11:00

## 2023-07-17 RX ADMIN — ACETYLCYSTEINE 600 MG: 100 SOLUTION ORAL; RESPIRATORY (INHALATION) at 04:30

## 2023-07-17 RX ADMIN — SODIUM CHLORIDE: 9 INJECTION, SOLUTION INTRAVENOUS at 14:45

## 2023-07-17 RX ADMIN — Medication 75 MCG/HR: at 04:59

## 2023-07-17 RX ADMIN — IPRATROPIUM BROMIDE AND ALBUTEROL SULFATE 1 DOSE: .5; 2.5 SOLUTION RESPIRATORY (INHALATION) at 12:58

## 2023-07-17 RX ADMIN — RIFAXIMIN 600 MG: 200 TABLET ORAL at 20:35

## 2023-07-17 RX ADMIN — SODIUM CHLORIDE, PRESERVATIVE FREE 10 ML: 5 INJECTION INTRAVENOUS at 11:03

## 2023-07-17 RX ADMIN — SODIUM CHLORIDE 3000 MG: 9 INJECTION, SOLUTION INTRAVENOUS at 20:37

## 2023-07-17 RX ADMIN — LEVETIRACETAM 1000 MG: 100 INJECTION INTRAVENOUS at 14:29

## 2023-07-17 RX ADMIN — FOLIC ACID 1 MG: 1 TABLET ORAL at 10:59

## 2023-07-17 RX ADMIN — HALOPERIDOL 2 MG: 2 TABLET ORAL at 20:35

## 2023-07-17 RX ADMIN — ASPIRIN 81 MG: 81 TABLET, COATED ORAL at 10:59

## 2023-07-17 RX ADMIN — SODIUM CHLORIDE, PRESERVATIVE FREE 10 ML: 5 INJECTION INTRAVENOUS at 20:35

## 2023-07-17 RX ADMIN — Medication 10 ML: at 22:20

## 2023-07-17 RX ADMIN — SODIUM CHLORIDE 3000 MG: 9 INJECTION, SOLUTION INTRAVENOUS at 15:44

## 2023-07-17 RX ADMIN — SUCRALFATE 1 G: 1 TABLET ORAL at 20:35

## 2023-07-17 RX ADMIN — IPRATROPIUM BROMIDE AND ALBUTEROL SULFATE 1 DOSE: .5; 2.5 SOLUTION RESPIRATORY (INHALATION) at 16:18

## 2023-07-17 RX ADMIN — IPRATROPIUM BROMIDE AND ALBUTEROL SULFATE 1 DOSE: .5; 2.5 SOLUTION RESPIRATORY (INHALATION) at 01:13

## 2023-07-17 RX ADMIN — ACETYLCYSTEINE 600 MG: 100 SOLUTION ORAL; RESPIRATORY (INHALATION) at 12:58

## 2023-07-17 RX ADMIN — SUCRALFATE 1 G: 1 TABLET ORAL at 10:59

## 2023-07-17 RX ADMIN — VANCOMYCIN HYDROCHLORIDE 1500 MG: 10 INJECTION, POWDER, LYOPHILIZED, FOR SOLUTION INTRAVENOUS at 02:59

## 2023-07-17 RX ADMIN — ACETYLCYSTEINE 600 MG: 100 SOLUTION ORAL; RESPIRATORY (INHALATION) at 01:13

## 2023-07-17 RX ADMIN — ROSUVASTATIN 10 MG: 10 TABLET, FILM COATED ORAL at 11:00

## 2023-07-17 RX ADMIN — LEVETIRACETAM 500 MG: 100 INJECTION INTRAVENOUS at 02:13

## 2023-07-17 RX ADMIN — SODIUM CHLORIDE 40 MG: 9 INJECTION INTRAMUSCULAR; INTRAVENOUS; SUBCUTANEOUS at 04:51

## 2023-07-17 RX ADMIN — THIAMINE HYDROCHLORIDE 100 MG: 100 INJECTION, SOLUTION INTRAMUSCULAR; INTRAVENOUS at 11:02

## 2023-07-17 RX ADMIN — ACETYLCYSTEINE 600 MG: 100 SOLUTION ORAL; RESPIRATORY (INHALATION) at 16:18

## 2023-07-17 RX ADMIN — SUCRALFATE 1 G: 1 TABLET ORAL at 05:35

## 2023-07-17 RX ADMIN — PHENOBARBITAL SODIUM 65 MG: 65 INJECTION INTRAMUSCULAR at 20:35

## 2023-07-17 RX ADMIN — RIFAXIMIN 600 MG: 200 TABLET ORAL at 14:31

## 2023-07-17 RX ADMIN — SODIUM CHLORIDE, PRESERVATIVE FREE 10 ML: 5 INJECTION INTRAVENOUS at 11:04

## 2023-07-17 RX ADMIN — VALPROATE SODIUM 250 MG: 100 INJECTION, SOLUTION INTRAVENOUS at 14:40

## 2023-07-17 RX ADMIN — Medication 10 ML: at 11:19

## 2023-07-17 RX ADMIN — Medication 3 MG/HR: at 15:47

## 2023-07-17 RX ADMIN — ACETYLCYSTEINE 600 MG: 100 SOLUTION ORAL; RESPIRATORY (INHALATION) at 19:24

## 2023-07-17 RX ADMIN — IPRATROPIUM BROMIDE AND ALBUTEROL SULFATE 1 DOSE: .5; 2.5 SOLUTION RESPIRATORY (INHALATION) at 07:55

## 2023-07-17 RX ADMIN — CEFEPIME 2000 MG: 2 INJECTION, POWDER, FOR SOLUTION INTRAVENOUS at 05:35

## 2023-07-17 RX ADMIN — FUROSEMIDE 60 MG: 10 INJECTION, SOLUTION INTRAMUSCULAR; INTRAVENOUS at 14:54

## 2023-07-17 RX ADMIN — ACETYLCYSTEINE 600 MG: 100 SOLUTION ORAL; RESPIRATORY (INHALATION) at 07:55

## 2023-07-17 RX ADMIN — RIFAXIMIN 600 MG: 200 TABLET ORAL at 10:59

## 2023-07-17 RX ADMIN — VALPROATE SODIUM 250 MG: 100 INJECTION, SOLUTION INTRAVENOUS at 22:20

## 2023-07-17 RX ADMIN — HEPARIN SODIUM 16 UNITS/KG/HR: 10000 INJECTION, SOLUTION INTRAVENOUS at 15:45

## 2023-07-17 RX ADMIN — LORAZEPAM 2 MG: 2 INJECTION INTRAMUSCULAR; INTRAVENOUS at 22:10

## 2023-07-17 RX ADMIN — MIRTAZAPINE 7.5 MG: 15 TABLET, FILM COATED ORAL at 20:35

## 2023-07-17 RX ADMIN — IPRATROPIUM BROMIDE AND ALBUTEROL SULFATE 1 DOSE: .5; 2.5 SOLUTION RESPIRATORY (INHALATION) at 04:30

## 2023-07-17 RX ADMIN — ALBUMIN (HUMAN) 50 G: 0.25 INJECTION, SOLUTION INTRAVENOUS at 17:14

## 2023-07-17 RX ADMIN — PHENOBARBITAL SODIUM 65 MG: 65 INJECTION INTRAMUSCULAR at 14:29

## 2023-07-17 RX ADMIN — ALBUMIN (HUMAN) 50 G: 0.25 INJECTION, SOLUTION INTRAVENOUS at 21:12

## 2023-07-17 RX ADMIN — HEPARIN SODIUM 20 UNITS/KG/HR: 10000 INJECTION, SOLUTION INTRAVENOUS at 01:55

## 2023-07-17 RX ADMIN — SUCRALFATE 1 G: 1 TABLET ORAL at 16:26

## 2023-07-17 ASSESSMENT — PULMONARY FUNCTION TESTS
PIF_VALUE: 32
PIF_VALUE: 25
PIF_VALUE: 26
PIF_VALUE: 27
PIF_VALUE: 39
PIF_VALUE: 29
PIF_VALUE: 47
PIF_VALUE: 31
PIF_VALUE: 27
PIF_VALUE: 26
PIF_VALUE: 24
PIF_VALUE: 28
PIF_VALUE: 26
PIF_VALUE: 27
PIF_VALUE: 45
PIF_VALUE: 25
PIF_VALUE: 30
PIF_VALUE: 27
PIF_VALUE: 25
PIF_VALUE: 26
PIF_VALUE: 38
PIF_VALUE: 30
PIF_VALUE: 47
PIF_VALUE: 29
PIF_VALUE: 20
PIF_VALUE: 25
PIF_VALUE: 27
PIF_VALUE: 25

## 2023-07-17 ASSESSMENT — PAIN SCALES - GENERAL
PAINLEVEL_OUTOF10: 0

## 2023-07-17 NOTE — CARE COORDINATION
Case Management Assessment  Initial Evaluation    Date/Time of Evaluation: 7/17/2023 9:39 AM  Assessment Completed by: Dung Cowan RN    If patient is discharged prior to next notation, then this note serves as note for discharge by case management. Patient Name: Karmen Tucker                   YOB: 1965  Diagnosis: DTs (delirium tremens) ([address]) [F10.931]  Supratherapeutic INR [R79.1]  Alcohol withdrawal syndrome with complication ([address]) [L37.145]  Alcohol withdrawal syndrome without complication ([address]) [J78.139]  Drug withdrawal seizure without complication ([address]) [T41.316, R56.9]  Anemia, unspecified type [D64.9]                   Date / Time: 7/14/2023  6:27 PM    Patient Admission Status: Inpatient   Readmission Risk (Low < 19, Mod (19-27), High > 27): Readmission Risk Score: 22.2    Current PCP: JOE Alanis  PCP verified by CM? Yes    Chart Reviewed: Yes      History Provided by: Medical Record  Patient Orientation: Unable to Assess    Patient Cognition: Alert    Hospitalization in the last 30 days (Readmission):  No    If yes, Readmission Assessment in CM Navigator will be completed. Advance Directives:      Code Status: Full Code   Patient's Primary Decision Maker is: Legal Next of Kin      Discharge Planning:    Patient lives with: Spouse/Significant Other Type of Home: House  Primary Care Giver: Friend  Patient Support Systems include: Spouse/Significant Other   Current Financial resources:    Current community resources:    Current services prior to admission: None            Current DME:              Type of Home Care services:  None    ADLS  Prior functional level: Independent in ADLs/IADLs  Current functional level: Assistance with the following:, Bathing, Dressing, Toileting, Feeding, Cooking, Housework, Shopping, Mobility    PT AM-PAC:   /24  OT AM-PAC:   /24    Family can provide assistance at DC:  Other (comment)  Would you like Case Management to discuss the discharge

## 2023-07-17 NOTE — CARE COORDINATION
Care Coordination; LOS 3 day. Alcohol withdrawal, seizures, hid head and on coumadin for valve replacement. INR >10. Admit to 39, became minimally responsive, transfer to MICU on 7/16/23, intubated, HERSON LLL aspiration pneumonia; cefepime, Fentanyl, heparin gtt, Versed, Vanc, Lasix qd. Bedside echo at moment. Referral made to peer recovery, no family at bedside.  Will follow for transition of care needs    Electronically signed by Radha Cerda RN on 7/17/2023 at 9:31 AM

## 2023-07-18 LAB
AADO2: 171.7 MMHG
ABSOLUTE BANDS #: NORMAL K/UL (ref 0–1)
ABSOLUTE PLASMA CELLS: NORMAL K/UL
AMMONIA PLAS-SCNC: 38 UMOL/L (ref 16–60)
ANION GAP SERPL CALCULATED.3IONS-SCNC: 14 MMOL/L (ref 7–16)
ATYPICAL LYMPHOCYTE ABSOLUTE COUNT: NORMAL K/UL
ATYPICAL LYMPHOCYTES: NORMAL %
B.E.: -5.6 MMOL/L (ref -3–3)
BANDS: NORMAL %
BASOPHILS # BLD: 0.03 K/UL (ref 0–0.2)
BASOPHILS # BLD: 0.03 K/UL (ref 0–0.2)
BASOPHILS # BLD: 0.04 K/UL (ref 0–0.2)
BASOPHILS # BLD: NORMAL K/UL (ref 0–0.2)
BASOPHILS NFR BLD: 0 % (ref 0–2)
BASOPHILS NFR BLD: 1 % (ref 0–2)
BASOPHILS NFR BLD: 1 % (ref 0–2)
BASOPHILS NFR BLD: NORMAL % (ref 0–2)
BLASTS ABSOLUTE COUNT: NORMAL K/UL
BLASTS: NORMAL %
BNP SERPL-MCNC: 1150 PG/ML (ref 0–125)
BUN SERPL-MCNC: 20 MG/DL (ref 6–20)
CA-I BLD-SCNC: 1.2 MMOL/L (ref 1.15–1.33)
CALCIUM SERPL-MCNC: 8.9 MG/DL (ref 8.6–10.2)
CHLORIDE SERPL-SCNC: 105 MMOL/L (ref 98–107)
CO2 SERPL-SCNC: 22 MMOL/L (ref 22–29)
COHB: 1 % (ref 0–1.5)
CREAT SERPL-MCNC: 2.5 MG/DL (ref 0.7–1.2)
CREAT UR-MCNC: 41.4 MG/DL (ref 40–278)
CRITICAL: ABNORMAL
DATE ANALYZED: ABNORMAL
DATE OF COLLECTION: ABNORMAL
EKG ATRIAL RATE: 69 BPM
EKG Q-T INTERVAL: 396 MS
EKG QRS DURATION: 134 MS
EKG QTC CALCULATION (BAZETT): 566 MS
EKG R AXIS: -43 DEGREES
EKG T AXIS: 126 DEGREES
EKG VENTRICULAR RATE: 123 BPM
EOSINOPHIL # BLD: 0.18 K/UL (ref 0.05–0.5)
EOSINOPHIL # BLD: 0.21 K/UL (ref 0.05–0.5)
EOSINOPHIL # BLD: 0.23 K/UL (ref 0.05–0.5)
EOSINOPHIL # BLD: NORMAL K/UL (ref 0–0.4)
EOSINOPHILS RELATIVE PERCENT: 3 % (ref 0–6)
EOSINOPHILS RELATIVE PERCENT: 4 % (ref 0–6)
EOSINOPHILS RELATIVE PERCENT: 5 % (ref 0–6)
EOSINOPHILS RELATIVE PERCENT: NORMAL % (ref 1–4)
ERYTHROCYTE [DISTWIDTH] IN BLOOD BY AUTOMATED COUNT: 18.6 % (ref 11.5–15)
ERYTHROCYTE [DISTWIDTH] IN BLOOD BY AUTOMATED COUNT: 18.7 % (ref 11.5–15)
ERYTHROCYTE [DISTWIDTH] IN BLOOD BY AUTOMATED COUNT: 19.1 % (ref 11.5–15)
ERYTHROCYTE [DISTWIDTH] IN BLOOD BY AUTOMATED COUNT: NORMAL % (ref 11.8–14.4)
FIO2: 40 %
GFR SERPL CREATININE-BSD FRML MDRD: 29 ML/MIN/1.73M2
GLUCOSE SERPL-MCNC: 88 MG/DL (ref 74–99)
HCO3: 20.2 MMOL/L (ref 22–26)
HCT VFR BLD AUTO: 24.5 % (ref 37–54)
HCT VFR BLD AUTO: 25.2 % (ref 37–54)
HCT VFR BLD AUTO: 29 % (ref 37–54)
HCT VFR BLD AUTO: NORMAL % (ref 40.7–50.3)
HGB BLD-MCNC: 7.1 G/DL (ref 12.5–16.5)
HGB BLD-MCNC: 7.1 G/DL (ref 12.5–16.5)
HGB BLD-MCNC: 8.3 G/DL (ref 12.5–16.5)
HGB BLD-MCNC: NORMAL G/DL (ref 13–17)
HHB: 18.7 % (ref 0–5)
IMM GRANULOCYTES # BLD AUTO: 0.03 K/UL (ref 0–0.58)
IMM GRANULOCYTES # BLD AUTO: <0.03 K/UL (ref 0–0.58)
IMM GRANULOCYTES # BLD AUTO: NORMAL K/UL (ref 0–0.3)
IMM GRANULOCYTES NFR BLD: 0 % (ref 0–5)
IMM GRANULOCYTES NFR BLD: 0 % (ref 0–5)
IMM GRANULOCYTES NFR BLD: NORMAL %
LAB: ABNORMAL
LACTATE BLDV-SCNC: 0.9 MMOL/L (ref 0.5–2.2)
LYMPHOCYTES # BLD: 11 % (ref 20–42)
LYMPHOCYTES # BLD: 12 % (ref 20–42)
LYMPHOCYTES # BLD: 13 % (ref 20–42)
LYMPHOCYTES # BLD: NORMAL % (ref 24–44)
LYMPHOCYTES NFR BLD: 0.6 K/UL (ref 1.5–4)
LYMPHOCYTES NFR BLD: 0.63 K/UL (ref 1.5–4)
LYMPHOCYTES NFR BLD: 0.72 K/UL (ref 1.5–4)
LYMPHOCYTES NFR BLD: NORMAL K/UL (ref 1–4.8)
Lab: ABNORMAL
MCH RBC QN AUTO: 24.7 PG (ref 26–35)
MCH RBC QN AUTO: 24.7 PG (ref 26–35)
MCH RBC QN AUTO: 24.9 PG (ref 26–35)
MCH RBC QN AUTO: NORMAL PG (ref 25.2–33.5)
MCHC RBC AUTO-ENTMCNC: 28.2 G/DL (ref 32–34.5)
MCHC RBC AUTO-ENTMCNC: 28.6 G/DL (ref 32–34.5)
MCHC RBC AUTO-ENTMCNC: 29 G/DL (ref 32–34.5)
MCHC RBC AUTO-ENTMCNC: NORMAL G/DL (ref 28.4–34.8)
MCV RBC AUTO: 85.4 FL (ref 80–99.9)
MCV RBC AUTO: 87.1 FL (ref 80–99.9)
MCV RBC AUTO: 87.8 FL (ref 80–99.9)
MCV RBC AUTO: NORMAL FL (ref 82.6–102.9)
METAMYELOCYTES ABSOLUTE COUNT: NORMAL K/UL
METAMYELOCYTES: NORMAL %
METER GLUCOSE: 85 MG/DL (ref 74–99)
METHB: 0.4 % (ref 0–1.5)
MODE: ABNORMAL
MONOCYTES NFR BLD: 0.13 K/UL (ref 0.1–0.95)
MONOCYTES NFR BLD: 0.48 K/UL (ref 0.1–0.95)
MONOCYTES NFR BLD: 0.63 K/UL (ref 0.1–0.95)
MONOCYTES NFR BLD: 10 % (ref 2–12)
MONOCYTES NFR BLD: 3 % (ref 2–12)
MONOCYTES NFR BLD: 9 % (ref 2–12)
MONOCYTES NFR BLD: NORMAL % (ref 1–7)
MONOCYTES NFR BLD: NORMAL K/UL (ref 0.1–0.8)
MYELOCYTES ABSOLUTE COUNT: NORMAL K/UL
MYELOCYTES: NORMAL %
NEUTROPHILS NFR BLD: 72 % (ref 43–80)
NEUTROPHILS NFR BLD: 76 % (ref 43–80)
NEUTROPHILS NFR BLD: 81 % (ref 43–80)
NEUTROPHILS NFR BLD: NORMAL % (ref 36–66)
NEUTS SEG NFR BLD: 3.37 K/UL (ref 1.8–7.3)
NEUTS SEG NFR BLD: 4.12 K/UL (ref 1.8–7.3)
NEUTS SEG NFR BLD: 5.24 K/UL (ref 1.8–7.3)
NEUTS SEG NFR BLD: NORMAL K/UL (ref 1.8–7.7)
NRBC BLD-RTO: NORMAL PER 100 WBC
NUCLEATED RED BLOOD CELLS: 0 PER 100 WBC
NUCLEATED RED BLOOD CELLS: NORMAL PER 100 WBC
O2 CONTENT: 10.6 ML/DL
O2 SATURATION: 81 % (ref 92–98.5)
O2HB: 79.9 % (ref 94–97)
OPERATOR ID: 7490
PARTIAL THROMBOPLASTIN TIME: 64.7 SEC (ref 24.5–35.1)
PARTIAL THROMBOPLASTIN TIME: 64.8 SEC (ref 24.5–35.1)
PARTIAL THROMBOPLASTIN TIME: 90.2 SEC (ref 24.5–35.1)
PATIENT TEMP: 37 C
PCO2: 41.1 MMHG (ref 35–45)
PEEP/CPAP: 8 CMH2O
PFO2: 1.4 MMHG/%
PH BLOOD GAS: 7.31 (ref 7.35–7.45)
PLASMA CELLS: NORMAL %
PLATELET # BLD AUTO: 92 K/UL (ref 130–450)
PLATELET # BLD AUTO: 95 K/UL (ref 130–450)
PLATELET # BLD AUTO: NORMAL K/UL (ref 138–453)
PLATELET CONFIRMATION: NORMAL
PLATELET ESTIMATE: NORMAL
PLATELET, FLUORESCENCE: 86 K/UL (ref 130–450)
PLATELET, FLUORESCENCE: NORMAL K/UL (ref 138–453)
PLATELETS.RETICULATED NFR BLD AUTO: NORMAL % (ref 1.1–10.3)
PMV BLD AUTO: 12 FL (ref 7–12)
PMV BLD AUTO: 12 FL (ref 7–12)
PMV BLD AUTO: ABNORMAL FL (ref 7–12)
PMV BLD AUTO: NORMAL FL (ref 8.1–13.5)
PO2: 56.2 MMHG (ref 75–100)
POTASSIUM SERPL-SCNC: 3.8 MMOL/L (ref 3.5–5)
PROCALCITONIN SERPL-MCNC: 0.23 NG/ML (ref 0–0.08)
PROMYELOCYTES ABSOLUTE COUNT: NORMAL K/UL
PROMYELOCYTES: NORMAL %
PS: 8 CMH20
RBC # BLD AUTO: 2.87 M/UL (ref 3.8–5.8)
RBC # BLD AUTO: 2.87 M/UL (ref 3.8–5.8)
RBC # BLD AUTO: 3.33 M/UL (ref 3.8–5.8)
RBC # BLD AUTO: NORMAL M/UL (ref 4.21–5.77)
RBC # BLD: NORMAL 10*6/UL
RI(T): 3.06
SODIUM SERPL-SCNC: 141 MMOL/L (ref 132–146)
SOURCE, BLOOD GAS: ABNORMAL
THB: 9.4 G/DL (ref 11.5–16.5)
TIME ANALYZED: 831
TOTAL PROTEIN, URINE: 15 MG/DL (ref 0–12)
URINE TOTAL PROTEIN CREATININE RATIO: 0.35 (ref 0–0.2)
WBC # BLD: NORMAL 10*3/UL
WBC OTHER # BLD: 4.7 K/UL (ref 4.5–11.5)
WBC OTHER # BLD: 5.1 K/UL (ref 4.5–11.5)
WBC OTHER # BLD: 6.9 K/UL (ref 4.5–11.5)
WBC OTHER # BLD: NORMAL K/UL (ref 3.5–11.3)

## 2023-07-18 PROCEDURE — 6370000000 HC RX 637 (ALT 250 FOR IP): Performed by: FAMILY MEDICINE

## 2023-07-18 PROCEDURE — 36430 TRANSFUSION BLD/BLD COMPNT: CPT

## 2023-07-18 PROCEDURE — 2500000003 HC RX 250 WO HCPCS: Performed by: INTERNAL MEDICINE

## 2023-07-18 PROCEDURE — 2580000003 HC RX 258: Performed by: FAMILY MEDICINE

## 2023-07-18 PROCEDURE — 31624 DX BRONCHOSCOPE/LAVAGE: CPT

## 2023-07-18 PROCEDURE — 93010 ELECTROCARDIOGRAM REPORT: CPT | Performed by: INTERNAL MEDICINE

## 2023-07-18 PROCEDURE — 6360000002 HC RX W HCPCS: Performed by: INTERNAL MEDICINE

## 2023-07-18 PROCEDURE — 2000000000 HC ICU R&B

## 2023-07-18 PROCEDURE — 82947 ASSAY GLUCOSE BLOOD QUANT: CPT

## 2023-07-18 PROCEDURE — P9016 RBC LEUKOCYTES REDUCED: HCPCS

## 2023-07-18 PROCEDURE — 6370000000 HC RX 637 (ALT 250 FOR IP): Performed by: INTERNAL MEDICINE

## 2023-07-18 PROCEDURE — 94003 VENT MGMT INPAT SUBQ DAY: CPT

## 2023-07-18 PROCEDURE — 2580000003 HC RX 258: Performed by: INTERNAL MEDICINE

## 2023-07-18 PROCEDURE — 83880 ASSAY OF NATRIURETIC PEPTIDE: CPT

## 2023-07-18 PROCEDURE — 86901 BLOOD TYPING SEROLOGIC RH(D): CPT

## 2023-07-18 PROCEDURE — 82805 BLOOD GASES W/O2 SATURATION: CPT

## 2023-07-18 PROCEDURE — 82570 ASSAY OF URINE CREATININE: CPT

## 2023-07-18 PROCEDURE — 85027 COMPLETE CBC AUTOMATED: CPT

## 2023-07-18 PROCEDURE — 86923 COMPATIBILITY TEST ELECTRIC: CPT

## 2023-07-18 PROCEDURE — P9047 ALBUMIN (HUMAN), 25%, 50ML: HCPCS | Performed by: INTERNAL MEDICINE

## 2023-07-18 PROCEDURE — 82140 ASSAY OF AMMONIA: CPT

## 2023-07-18 PROCEDURE — 83605 ASSAY OF LACTIC ACID: CPT

## 2023-07-18 PROCEDURE — 0B9J8ZX DRAINAGE OF LEFT LOWER LUNG LOBE, VIA NATURAL OR ARTIFICIAL OPENING ENDOSCOPIC, DIAGNOSTIC: ICD-10-PCS | Performed by: INTERNAL MEDICINE

## 2023-07-18 PROCEDURE — 86850 RBC ANTIBODY SCREEN: CPT

## 2023-07-18 PROCEDURE — 86900 BLOOD TYPING SEROLOGIC ABO: CPT

## 2023-07-18 PROCEDURE — 87070 CULTURE OTHR SPECIMN AEROBIC: CPT

## 2023-07-18 PROCEDURE — 84145 PROCALCITONIN (PCT): CPT

## 2023-07-18 PROCEDURE — 94640 AIRWAY INHALATION TREATMENT: CPT

## 2023-07-18 PROCEDURE — 80048 BASIC METABOLIC PNL TOTAL CA: CPT

## 2023-07-18 PROCEDURE — 85730 THROMBOPLASTIN TIME PARTIAL: CPT

## 2023-07-18 PROCEDURE — 99291 CRITICAL CARE FIRST HOUR: CPT | Performed by: INTERNAL MEDICINE

## 2023-07-18 PROCEDURE — 36600 WITHDRAWAL OF ARTERIAL BLOOD: CPT

## 2023-07-18 PROCEDURE — 82330 ASSAY OF CALCIUM: CPT

## 2023-07-18 PROCEDURE — C9113 INJ PANTOPRAZOLE SODIUM, VIA: HCPCS | Performed by: INTERNAL MEDICINE

## 2023-07-18 PROCEDURE — 36415 COLL VENOUS BLD VENIPUNCTURE: CPT

## 2023-07-18 PROCEDURE — 6360000002 HC RX W HCPCS: Performed by: FAMILY MEDICINE

## 2023-07-18 PROCEDURE — 84156 ASSAY OF PROTEIN URINE: CPT

## 2023-07-18 PROCEDURE — 94669 MECHANICAL CHEST WALL OSCILL: CPT

## 2023-07-18 PROCEDURE — 87205 SMEAR GRAM STAIN: CPT

## 2023-07-18 RX ORDER — SODIUM CHLORIDE 9 MG/ML
INJECTION, SOLUTION INTRAVENOUS PRN
Status: DISCONTINUED | OUTPATIENT
Start: 2023-07-18 | End: 2023-07-19

## 2023-07-18 RX ORDER — PHENOBARBITAL SODIUM 65 MG/ML
30 INJECTION INTRAMUSCULAR 3 TIMES DAILY
Status: DISCONTINUED | OUTPATIENT
Start: 2023-07-18 | End: 2023-07-20

## 2023-07-18 RX ORDER — ALBUMIN (HUMAN) 12.5 G/50ML
50 SOLUTION INTRAVENOUS EVERY 6 HOURS
Status: COMPLETED | OUTPATIENT
Start: 2023-07-18 | End: 2023-07-19

## 2023-07-18 RX ORDER — HALOPERIDOL 1 MG/1
1 TABLET ORAL NIGHTLY
Status: DISCONTINUED | OUTPATIENT
Start: 2023-07-18 | End: 2023-07-19

## 2023-07-18 RX ORDER — SODIUM CHLORIDE, SODIUM LACTATE, POTASSIUM CHLORIDE, CALCIUM CHLORIDE 600; 310; 30; 20 MG/100ML; MG/100ML; MG/100ML; MG/100ML
INJECTION, SOLUTION INTRAVENOUS CONTINUOUS
Status: DISCONTINUED | OUTPATIENT
Start: 2023-07-18 | End: 2023-07-20

## 2023-07-18 RX ORDER — MIDAZOLAM HYDROCHLORIDE 1 MG/ML
2 INJECTION, SOLUTION INTRAVENOUS CONTINUOUS
Status: DISCONTINUED | OUTPATIENT
Start: 2023-07-18 | End: 2023-07-20

## 2023-07-18 RX ADMIN — SODIUM CHLORIDE, PRESERVATIVE FREE 40 MG: 5 INJECTION INTRAVENOUS at 08:21

## 2023-07-18 RX ADMIN — FUROSEMIDE 80 MG: 10 INJECTION, SOLUTION INTRAMUSCULAR; INTRAVENOUS at 08:21

## 2023-07-18 RX ADMIN — IPRATROPIUM BROMIDE AND ALBUTEROL SULFATE 1 DOSE: .5; 2.5 SOLUTION RESPIRATORY (INHALATION) at 15:53

## 2023-07-18 RX ADMIN — PHENOBARBITAL SODIUM 65 MG: 65 INJECTION INTRAMUSCULAR at 08:21

## 2023-07-18 RX ADMIN — LORAZEPAM 2 MG: 2 INJECTION INTRAMUSCULAR; INTRAVENOUS at 11:20

## 2023-07-18 RX ADMIN — LEVETIRACETAM 1000 MG: 100 INJECTION INTRAVENOUS at 02:18

## 2023-07-18 RX ADMIN — ALBUMIN (HUMAN) 50 G: 0.25 INJECTION, SOLUTION INTRAVENOUS at 09:31

## 2023-07-18 RX ADMIN — ACETYLCYSTEINE 600 MG: 100 SOLUTION ORAL; RESPIRATORY (INHALATION) at 12:25

## 2023-07-18 RX ADMIN — SUCRALFATE 1 G: 1 TABLET ORAL at 16:39

## 2023-07-18 RX ADMIN — SUCRALFATE 1 G: 1 TABLET ORAL at 11:22

## 2023-07-18 RX ADMIN — VALPROATE SODIUM 250 MG: 100 INJECTION, SOLUTION INTRAVENOUS at 08:19

## 2023-07-18 RX ADMIN — IPRATROPIUM BROMIDE AND ALBUTEROL SULFATE 1 DOSE: .5; 2.5 SOLUTION RESPIRATORY (INHALATION) at 23:31

## 2023-07-18 RX ADMIN — ACETYLCYSTEINE 600 MG: 100 SOLUTION ORAL; RESPIRATORY (INHALATION) at 00:00

## 2023-07-18 RX ADMIN — PHENOBARBITAL SODIUM 30 MG: 65 INJECTION INTRAMUSCULAR at 20:11

## 2023-07-18 RX ADMIN — PHENOBARBITAL SODIUM 65 MG: 65 INJECTION INTRAMUSCULAR at 14:35

## 2023-07-18 RX ADMIN — RIFAXIMIN 600 MG: 200 TABLET ORAL at 14:30

## 2023-07-18 RX ADMIN — LORAZEPAM 2 MG: 2 INJECTION INTRAMUSCULAR; INTRAVENOUS at 20:12

## 2023-07-18 RX ADMIN — ACETYLCYSTEINE 600 MG: 100 SOLUTION ORAL; RESPIRATORY (INHALATION) at 15:53

## 2023-07-18 RX ADMIN — SUCRALFATE 1 G: 1 TABLET ORAL at 06:31

## 2023-07-18 RX ADMIN — SODIUM CHLORIDE 3000 MG: 9 INJECTION, SOLUTION INTRAVENOUS at 16:41

## 2023-07-18 RX ADMIN — ALBUMIN (HUMAN) 50 G: 0.25 INJECTION, SOLUTION INTRAVENOUS at 20:28

## 2023-07-18 RX ADMIN — VALPROATE SODIUM 250 MG: 100 INJECTION, SOLUTION INTRAVENOUS at 14:35

## 2023-07-18 RX ADMIN — SODIUM CHLORIDE, POTASSIUM CHLORIDE, SODIUM LACTATE AND CALCIUM CHLORIDE: 600; 310; 30; 20 INJECTION, SOLUTION INTRAVENOUS at 11:38

## 2023-07-18 RX ADMIN — ACETYLCYSTEINE 600 MG: 100 SOLUTION ORAL; RESPIRATORY (INHALATION) at 23:31

## 2023-07-18 RX ADMIN — HEPARIN SODIUM 11 UNITS/KG/HR: 10000 INJECTION, SOLUTION INTRAVENOUS at 12:43

## 2023-07-18 RX ADMIN — TAMSULOSIN HYDROCHLORIDE 0.4 MG: 0.4 CAPSULE ORAL at 08:19

## 2023-07-18 RX ADMIN — ASPIRIN 81 MG: 81 TABLET, COATED ORAL at 08:20

## 2023-07-18 RX ADMIN — ACETYLCYSTEINE 600 MG: 100 SOLUTION ORAL; RESPIRATORY (INHALATION) at 08:16

## 2023-07-18 RX ADMIN — SUCRALFATE 1 G: 1 TABLET ORAL at 20:11

## 2023-07-18 RX ADMIN — SODIUM CHLORIDE, PRESERVATIVE FREE 10 ML: 5 INJECTION INTRAVENOUS at 08:22

## 2023-07-18 RX ADMIN — Medication 50 MCG/HR: at 00:21

## 2023-07-18 RX ADMIN — FOLIC ACID 1 MG: 1 TABLET ORAL at 08:20

## 2023-07-18 RX ADMIN — IPRATROPIUM BROMIDE AND ALBUTEROL SULFATE 1 DOSE: .5; 2.5 SOLUTION RESPIRATORY (INHALATION) at 12:25

## 2023-07-18 RX ADMIN — Medication 10 ML: at 08:22

## 2023-07-18 RX ADMIN — ACETYLCYSTEINE 600 MG: 100 SOLUTION ORAL; RESPIRATORY (INHALATION) at 20:22

## 2023-07-18 RX ADMIN — IPRATROPIUM BROMIDE AND ALBUTEROL SULFATE 1 DOSE: .5; 2.5 SOLUTION RESPIRATORY (INHALATION) at 00:01

## 2023-07-18 RX ADMIN — ROSUVASTATIN 10 MG: 10 TABLET, FILM COATED ORAL at 08:21

## 2023-07-18 RX ADMIN — SODIUM CHLORIDE 3000 MG: 9 INJECTION, SOLUTION INTRAVENOUS at 09:31

## 2023-07-18 RX ADMIN — LORAZEPAM 2 MG: 2 INJECTION INTRAMUSCULAR; INTRAVENOUS at 02:21

## 2023-07-18 RX ADMIN — LEVETIRACETAM 1000 MG: 100 INJECTION INTRAVENOUS at 14:35

## 2023-07-18 RX ADMIN — LORAZEPAM 2 MG: 2 INJECTION INTRAMUSCULAR; INTRAVENOUS at 22:29

## 2023-07-18 RX ADMIN — ACETYLCYSTEINE 600 MG: 100 SOLUTION ORAL; RESPIRATORY (INHALATION) at 04:16

## 2023-07-18 RX ADMIN — ALBUMIN (HUMAN) 50 G: 0.25 INJECTION, SOLUTION INTRAVENOUS at 16:41

## 2023-07-18 RX ADMIN — LORAZEPAM 2 MG: 2 INJECTION INTRAMUSCULAR; INTRAVENOUS at 21:25

## 2023-07-18 RX ADMIN — IPRATROPIUM BROMIDE AND ALBUTEROL SULFATE 1 DOSE: .5; 2.5 SOLUTION RESPIRATORY (INHALATION) at 08:16

## 2023-07-18 RX ADMIN — SODIUM CHLORIDE 3000 MG: 9 INJECTION, SOLUTION INTRAVENOUS at 02:26

## 2023-07-18 RX ADMIN — LORAZEPAM 2 MG: 2 INJECTION INTRAMUSCULAR; INTRAVENOUS at 16:38

## 2023-07-18 RX ADMIN — SODIUM CHLORIDE 3000 MG: 9 INJECTION, SOLUTION INTRAVENOUS at 20:31

## 2023-07-18 RX ADMIN — SODIUM CHLORIDE, PRESERVATIVE FREE 10 ML: 5 INJECTION INTRAVENOUS at 20:11

## 2023-07-18 RX ADMIN — IPRATROPIUM BROMIDE AND ALBUTEROL SULFATE 1 DOSE: .5; 2.5 SOLUTION RESPIRATORY (INHALATION) at 20:23

## 2023-07-18 RX ADMIN — IPRATROPIUM BROMIDE AND ALBUTEROL SULFATE 1 DOSE: .5; 2.5 SOLUTION RESPIRATORY (INHALATION) at 04:17

## 2023-07-18 RX ADMIN — THIAMINE HYDROCHLORIDE 100 MG: 100 INJECTION, SOLUTION INTRAMUSCULAR; INTRAVENOUS at 08:21

## 2023-07-18 RX ADMIN — HALOPERIDOL 1 MG: 1 TABLET ORAL at 20:17

## 2023-07-18 RX ADMIN — RIFAXIMIN 600 MG: 200 TABLET ORAL at 08:30

## 2023-07-18 ASSESSMENT — PAIN SCALES - GENERAL
PAINLEVEL_OUTOF10: 0

## 2023-07-18 ASSESSMENT — PULMONARY FUNCTION TESTS
PIF_VALUE: 29
PIF_VALUE: 17
PIF_VALUE: 31
PIF_VALUE: 28
PIF_VALUE: 29
PIF_VALUE: 30
PIF_VALUE: 28
PIF_VALUE: 28
PIF_VALUE: 29
PIF_VALUE: 32
PIF_VALUE: 28
PIF_VALUE: 33
PIF_VALUE: 30
PIF_VALUE: 28
PIF_VALUE: 26
PIF_VALUE: 29
PIF_VALUE: 30
PIF_VALUE: 30
PIF_VALUE: 25
PIF_VALUE: 27
PIF_VALUE: 29
PIF_VALUE: 28
PIF_VALUE: 34
PIF_VALUE: 31
PIF_VALUE: 15
PIF_VALUE: 31
PIF_VALUE: 26
PIF_VALUE: 30
PIF_VALUE: 29
PIF_VALUE: 30

## 2023-07-18 NOTE — CARE COORDINATION
Social Work/ Case Management Transition of Care Planning (1 Betty Gonzáles, [address]):   Per chart review and report Pt remains intubated, weaning trials tried today. Pt restrained for safety as Pt was interfering with medical devices/lines. Pt awake but following minimal commands. Pt creatinine 2.4, hold diuresis & IV fluids started. Pt is on a heparin drip, depacon, versed, luminal, unasyn, protonix, keppra. Pt had bronch. Referral made to peer recovery. SW/CM will follow for transition of care plan.    1 Betty [place], [place]  7/18/2023

## 2023-07-19 ENCOUNTER — APPOINTMENT (OUTPATIENT)
Dept: GENERAL RADIOLOGY | Age: 58
End: 2023-07-19
Payer: COMMERCIAL

## 2023-07-19 PROBLEM — E43 SEVERE PROTEIN-CALORIE MALNUTRITION (HCC): Chronic | Status: ACTIVE | Noted: 2023-07-19

## 2023-07-19 LAB
AADO2: 172.9 MMHG
ABO/RH: NORMAL
ANION GAP SERPL CALCULATED.3IONS-SCNC: 14 MMOL/L (ref 7–16)
ANION GAP SERPL CALCULATED.3IONS-SCNC: 15 MMOL/L (ref 7–16)
ANTIBODY SCREEN: NEGATIVE
ARM BAND NUMBER: NORMAL
B.E.: -2.6 MMOL/L (ref -3–3)
BASOPHILS # BLD: 0.02 K/UL (ref 0–0.2)
BASOPHILS # BLD: 0.03 K/UL (ref 0–0.2)
BASOPHILS NFR BLD: 1 % (ref 0–2)
BASOPHILS NFR BLD: 1 % (ref 0–2)
BLOOD BANK BLOOD PRODUCT EXPIRATION DATE: NORMAL
BLOOD BANK DISPENSE STATUS: NORMAL
BLOOD BANK ISBT PRODUCT BLOOD TYPE: 9500
BLOOD BANK PRODUCT CODE: NORMAL
BLOOD BANK SAMPLE EXPIRATION: NORMAL
BLOOD BANK UNIT TYPE AND RH: NORMAL
BNP SERPL-MCNC: 3061 PG/ML (ref 0–125)
BPU ID: NORMAL
BUN SERPL-MCNC: 19 MG/DL (ref 6–20)
BUN SERPL-MCNC: 19 MG/DL (ref 6–20)
CALCIUM SERPL-MCNC: 8.6 MG/DL (ref 8.6–10.2)
CALCIUM SERPL-MCNC: 9.1 MG/DL (ref 8.6–10.2)
CHLORIDE SERPL-SCNC: 107 MMOL/L (ref 98–107)
CHLORIDE SERPL-SCNC: 107 MMOL/L (ref 98–107)
CO2 SERPL-SCNC: 21 MMOL/L (ref 22–29)
CO2 SERPL-SCNC: 21 MMOL/L (ref 22–29)
COHB: 1.6 % (ref 0–1.5)
COMPONENT: NORMAL
CREAT SERPL-MCNC: 2.1 MG/DL (ref 0.7–1.2)
CREAT SERPL-MCNC: 2.4 MG/DL (ref 0.7–1.2)
CRITICAL: ABNORMAL
CROSSMATCH RESULT: NORMAL
DATE ANALYZED: ABNORMAL
DATE OF COLLECTION: ABNORMAL
EOSINOPHIL # BLD: 0.23 K/UL (ref 0.05–0.5)
EOSINOPHIL # BLD: 0.28 K/UL (ref 0.05–0.5)
EOSINOPHILS RELATIVE PERCENT: 5 % (ref 0–6)
EOSINOPHILS RELATIVE PERCENT: 6 % (ref 0–6)
ERYTHROCYTE [DISTWIDTH] IN BLOOD BY AUTOMATED COUNT: 19 % (ref 11.5–15)
ERYTHROCYTE [DISTWIDTH] IN BLOOD BY AUTOMATED COUNT: 19.2 % (ref 11.5–15)
FIO2: 40 %
GFR SERPL CREATININE-BSD FRML MDRD: 31 ML/MIN/1.73M2
GFR SERPL CREATININE-BSD FRML MDRD: 36 ML/MIN/1.73M2
GLUCOSE SERPL-MCNC: 69 MG/DL (ref 74–99)
GLUCOSE SERPL-MCNC: 74 MG/DL (ref 74–99)
HCO3: 22.3 MMOL/L (ref 22–26)
HCT VFR BLD AUTO: 27.2 % (ref 37–54)
HCT VFR BLD AUTO: 28.4 % (ref 37–54)
HGB BLD-MCNC: 7.9 G/DL (ref 12.5–16.5)
HGB BLD-MCNC: 7.9 G/DL (ref 12.5–16.5)
HHB: 12.7 % (ref 0–5)
IMM GRANULOCYTES # BLD AUTO: <0.03 K/UL (ref 0–0.58)
IMM GRANULOCYTES # BLD AUTO: <0.03 K/UL (ref 0–0.58)
IMM GRANULOCYTES NFR BLD: 0 % (ref 0–5)
IMM GRANULOCYTES NFR BLD: 0 % (ref 0–5)
LAB: ABNORMAL
LYMPHOCYTES NFR BLD: 0.56 K/UL (ref 1.5–4)
LYMPHOCYTES NFR BLD: 0.58 K/UL (ref 1.5–4)
LYMPHOCYTES RELATIVE PERCENT: 11 % (ref 20–42)
LYMPHOCYTES RELATIVE PERCENT: 13 % (ref 20–42)
Lab: ABNORMAL
MCH RBC QN AUTO: 24.8 PG (ref 26–35)
MCH RBC QN AUTO: 25.2 PG (ref 26–35)
MCHC RBC AUTO-ENTMCNC: 27.8 G/DL (ref 32–34.5)
MCHC RBC AUTO-ENTMCNC: 29 G/DL (ref 32–34.5)
MCV RBC AUTO: 86.6 FL (ref 80–99.9)
MCV RBC AUTO: 89.3 FL (ref 80–99.9)
METHB: 0.5 % (ref 0–1.5)
MICROORGANISM SPEC CULT: ABNORMAL
MICROORGANISM SPEC CULT: ABNORMAL
MICROORGANISM/AGENT SPEC: ABNORMAL
MODE: AC
MONOCYTES NFR BLD: 0.47 K/UL (ref 0.1–0.95)
MONOCYTES NFR BLD: 0.56 K/UL (ref 0.1–0.95)
MONOCYTES NFR BLD: 11 % (ref 2–12)
MONOCYTES NFR BLD: 11 % (ref 2–12)
NEUTROPHILS NFR BLD: 70 % (ref 43–80)
NEUTROPHILS NFR BLD: 71 % (ref 43–80)
NEUTS SEG NFR BLD: 3.13 K/UL (ref 1.8–7.3)
NEUTS SEG NFR BLD: 3.55 K/UL (ref 1.8–7.3)
O2 CONTENT: 10.7 ML/DL
O2 SATURATION: 87 % (ref 92–98.5)
O2HB: 85.2 % (ref 94–97)
OPERATOR ID: ABNORMAL
PARTIAL THROMBOPLASTIN TIME: 59.9 SEC (ref 24.5–35.1)
PATIENT TEMP: 37 C
PCO2: 39 MMHG (ref 35–45)
PEEP/CPAP: 8 CMH2O
PFO2: 1.44 MMHG/%
PH BLOOD GAS: 7.38 (ref 7.35–7.45)
PLATELET # BLD AUTO: 75 K/UL (ref 130–450)
PLATELET # BLD AUTO: 78 K/UL (ref 130–450)
PLATELET CONFIRMATION: NORMAL
PLATELET CONFIRMATION: NORMAL
PMV BLD AUTO: 11.3 FL (ref 7–12)
PMV BLD AUTO: 12.3 FL (ref 7–12)
PO2: 57.5 MMHG (ref 75–100)
POTASSIUM SERPL-SCNC: 3.3 MMOL/L (ref 3.5–5)
POTASSIUM SERPL-SCNC: 5.4 MMOL/L (ref 3.5–5)
RBC # BLD AUTO: 3.14 M/UL (ref 3.8–5.8)
RBC # BLD AUTO: 3.18 M/UL (ref 3.8–5.8)
RBC # BLD: ABNORMAL 10*6/UL
RI(T): 3.01
RR MECHANICAL: 18 B/MIN
SODIUM SERPL-SCNC: 142 MMOL/L (ref 132–146)
SODIUM SERPL-SCNC: 143 MMOL/L (ref 132–146)
SOURCE, BLOOD GAS: ABNORMAL
SPECIMEN DESCRIPTION: ABNORMAL
SPECIMEN DESCRIPTION: ABNORMAL
THB: 8.9 G/DL (ref 11.5–16.5)
TIME ANALYZED: 422
TRANSFUSION STATUS: NORMAL
UNIT DIVISION: 0
UNIT ISSUE DATE/TIME: NORMAL
VT MECHANICAL: 480 ML
WBC OTHER # BLD: 4.4 K/UL (ref 4.5–11.5)
WBC OTHER # BLD: 5 K/UL (ref 4.5–11.5)

## 2023-07-19 PROCEDURE — 80048 BASIC METABOLIC PNL TOTAL CA: CPT

## 2023-07-19 PROCEDURE — 6370000000 HC RX 637 (ALT 250 FOR IP): Performed by: INTERNAL MEDICINE

## 2023-07-19 PROCEDURE — A4216 STERILE WATER/SALINE, 10 ML: HCPCS | Performed by: INTERNAL MEDICINE

## 2023-07-19 PROCEDURE — 85027 COMPLETE CBC AUTOMATED: CPT

## 2023-07-19 PROCEDURE — 6360000002 HC RX W HCPCS: Performed by: INTERNAL MEDICINE

## 2023-07-19 PROCEDURE — 99291 CRITICAL CARE FIRST HOUR: CPT | Performed by: INTERNAL MEDICINE

## 2023-07-19 PROCEDURE — 2580000003 HC RX 258: Performed by: INTERNAL MEDICINE

## 2023-07-19 PROCEDURE — 94003 VENT MGMT INPAT SUBQ DAY: CPT

## 2023-07-19 PROCEDURE — 82805 BLOOD GASES W/O2 SATURATION: CPT

## 2023-07-19 PROCEDURE — 94669 MECHANICAL CHEST WALL OSCILL: CPT

## 2023-07-19 PROCEDURE — 83880 ASSAY OF NATRIURETIC PEPTIDE: CPT

## 2023-07-19 PROCEDURE — 71045 X-RAY EXAM CHEST 1 VIEW: CPT

## 2023-07-19 PROCEDURE — 87205 SMEAR GRAM STAIN: CPT

## 2023-07-19 PROCEDURE — 94640 AIRWAY INHALATION TREATMENT: CPT

## 2023-07-19 PROCEDURE — P9047 ALBUMIN (HUMAN), 25%, 50ML: HCPCS | Performed by: INTERNAL MEDICINE

## 2023-07-19 PROCEDURE — 87070 CULTURE OTHR SPECIMN AEROBIC: CPT

## 2023-07-19 PROCEDURE — 85730 THROMBOPLASTIN TIME PARTIAL: CPT

## 2023-07-19 PROCEDURE — C9113 INJ PANTOPRAZOLE SODIUM, VIA: HCPCS | Performed by: INTERNAL MEDICINE

## 2023-07-19 PROCEDURE — 2000000000 HC ICU R&B

## 2023-07-19 PROCEDURE — 2500000003 HC RX 250 WO HCPCS: Performed by: INTERNAL MEDICINE

## 2023-07-19 RX ORDER — POTASSIUM CHLORIDE 7.45 MG/ML
10 INJECTION INTRAVENOUS
Status: COMPLETED | OUTPATIENT
Start: 2023-07-19 | End: 2023-07-19

## 2023-07-19 RX ORDER — POTASSIUM CHLORIDE 29.8 MG/ML
40 INJECTION INTRAVENOUS
Status: DISCONTINUED | OUTPATIENT
Start: 2023-07-19 | End: 2023-07-19

## 2023-07-19 RX ADMIN — ACETYLCYSTEINE 600 MG: 100 SOLUTION ORAL; RESPIRATORY (INHALATION) at 04:00

## 2023-07-19 RX ADMIN — PHENOBARBITAL SODIUM 30 MG: 65 INJECTION INTRAMUSCULAR at 09:30

## 2023-07-19 RX ADMIN — SODIUM CHLORIDE 3000 MG: 9 INJECTION, SOLUTION INTRAVENOUS at 14:50

## 2023-07-19 RX ADMIN — SUCRALFATE 1 G: 1 TABLET ORAL at 20:11

## 2023-07-19 RX ADMIN — IPRATROPIUM BROMIDE AND ALBUTEROL SULFATE 1 DOSE: .5; 2.5 SOLUTION RESPIRATORY (INHALATION) at 11:43

## 2023-07-19 RX ADMIN — LORAZEPAM 1 MG: 2 INJECTION INTRAMUSCULAR; INTRAVENOUS at 23:16

## 2023-07-19 RX ADMIN — Medication 25 MCG/HR: at 01:19

## 2023-07-19 RX ADMIN — SUCRALFATE 1 G: 1 TABLET ORAL at 06:20

## 2023-07-19 RX ADMIN — ASPIRIN 81 MG: 81 TABLET, COATED ORAL at 09:30

## 2023-07-19 RX ADMIN — LEVETIRACETAM 1000 MG: 100 INJECTION INTRAVENOUS at 02:50

## 2023-07-19 RX ADMIN — LORAZEPAM 4 MG: 2 INJECTION INTRAMUSCULAR; INTRAVENOUS at 01:53

## 2023-07-19 RX ADMIN — SODIUM CHLORIDE, PRESERVATIVE FREE 40 MG: 5 INJECTION INTRAVENOUS at 09:30

## 2023-07-19 RX ADMIN — IPRATROPIUM BROMIDE AND ALBUTEROL SULFATE 1 DOSE: .5; 2.5 SOLUTION RESPIRATORY (INHALATION) at 15:54

## 2023-07-19 RX ADMIN — ALBUMIN (HUMAN) 50 G: 0.25 INJECTION, SOLUTION INTRAVENOUS at 03:54

## 2023-07-19 RX ADMIN — SODIUM CHLORIDE, PRESERVATIVE FREE 10 ML: 5 INJECTION INTRAVENOUS at 09:33

## 2023-07-19 RX ADMIN — ACETYLCYSTEINE 600 MG: 100 SOLUTION ORAL; RESPIRATORY (INHALATION) at 15:54

## 2023-07-19 RX ADMIN — SODIUM CHLORIDE 3000 MG: 9 INJECTION, SOLUTION INTRAVENOUS at 03:48

## 2023-07-19 RX ADMIN — IPRATROPIUM BROMIDE AND ALBUTEROL SULFATE 1 DOSE: .5; 2.5 SOLUTION RESPIRATORY (INHALATION) at 04:00

## 2023-07-19 RX ADMIN — HEPARIN SODIUM 11 UNITS/KG/HR: 10000 INJECTION, SOLUTION INTRAVENOUS at 11:13

## 2023-07-19 RX ADMIN — LORAZEPAM 2 MG: 2 INJECTION INTRAMUSCULAR; INTRAVENOUS at 03:42

## 2023-07-19 RX ADMIN — SUCRALFATE 1 G: 1 TABLET ORAL at 18:20

## 2023-07-19 RX ADMIN — POTASSIUM CHLORIDE 10 MEQ: 7.46 INJECTION, SOLUTION INTRAVENOUS at 11:09

## 2023-07-19 RX ADMIN — SODIUM CHLORIDE, POTASSIUM CHLORIDE, SODIUM LACTATE AND CALCIUM CHLORIDE: 600; 310; 30; 20 INJECTION, SOLUTION INTRAVENOUS at 11:03

## 2023-07-19 RX ADMIN — POTASSIUM CHLORIDE 10 MEQ: 7.46 INJECTION, SOLUTION INTRAVENOUS at 13:48

## 2023-07-19 RX ADMIN — SODIUM CHLORIDE, POTASSIUM CHLORIDE, SODIUM LACTATE AND CALCIUM CHLORIDE: 600; 310; 30; 20 INJECTION, SOLUTION INTRAVENOUS at 19:09

## 2023-07-19 RX ADMIN — POTASSIUM CHLORIDE 10 MEQ: 7.46 INJECTION, SOLUTION INTRAVENOUS at 12:23

## 2023-07-19 RX ADMIN — ACETYLCYSTEINE 600 MG: 100 SOLUTION ORAL; RESPIRATORY (INHALATION) at 20:10

## 2023-07-19 RX ADMIN — ACETYLCYSTEINE 600 MG: 100 SOLUTION ORAL; RESPIRATORY (INHALATION) at 08:52

## 2023-07-19 RX ADMIN — SODIUM CHLORIDE, PRESERVATIVE FREE 10 ML: 5 INJECTION INTRAVENOUS at 20:11

## 2023-07-19 RX ADMIN — ACETYLCYSTEINE 600 MG: 100 SOLUTION ORAL; RESPIRATORY (INHALATION) at 11:43

## 2023-07-19 RX ADMIN — THIAMINE HYDROCHLORIDE 100 MG: 100 INJECTION, SOLUTION INTRAMUSCULAR; INTRAVENOUS at 09:30

## 2023-07-19 RX ADMIN — TAMSULOSIN HYDROCHLORIDE 0.4 MG: 0.4 CAPSULE ORAL at 11:05

## 2023-07-19 RX ADMIN — ACETYLCYSTEINE 600 MG: 100 SOLUTION ORAL; RESPIRATORY (INHALATION) at 23:58

## 2023-07-19 RX ADMIN — ROSUVASTATIN 10 MG: 10 TABLET, FILM COATED ORAL at 09:30

## 2023-07-19 RX ADMIN — SODIUM CHLORIDE 3000 MG: 9 INJECTION, SOLUTION INTRAVENOUS at 20:15

## 2023-07-19 RX ADMIN — SUCRALFATE 1 G: 1 TABLET ORAL at 11:05

## 2023-07-19 RX ADMIN — SODIUM CHLORIDE: 9 INJECTION, SOLUTION INTRAVENOUS at 11:05

## 2023-07-19 RX ADMIN — POTASSIUM CHLORIDE 10 MEQ: 7.46 INJECTION, SOLUTION INTRAVENOUS at 14:50

## 2023-07-19 RX ADMIN — LEVETIRACETAM 1000 MG: 100 INJECTION INTRAVENOUS at 14:43

## 2023-07-19 RX ADMIN — IPRATROPIUM BROMIDE AND ALBUTEROL SULFATE 1 DOSE: .5; 2.5 SOLUTION RESPIRATORY (INHALATION) at 08:52

## 2023-07-19 RX ADMIN — IPRATROPIUM BROMIDE AND ALBUTEROL SULFATE 1 DOSE: .5; 2.5 SOLUTION RESPIRATORY (INHALATION) at 23:58

## 2023-07-19 RX ADMIN — SODIUM CHLORIDE 3000 MG: 9 INJECTION, SOLUTION INTRAVENOUS at 09:40

## 2023-07-19 RX ADMIN — POTASSIUM BICARBONATE 40 MEQ: 782 TABLET, EFFERVESCENT ORAL at 11:07

## 2023-07-19 RX ADMIN — LORAZEPAM 2 MG: 2 INJECTION INTRAMUSCULAR; INTRAVENOUS at 00:05

## 2023-07-19 RX ADMIN — FOLIC ACID 1 MG: 1 TABLET ORAL at 09:30

## 2023-07-19 RX ADMIN — IPRATROPIUM BROMIDE AND ALBUTEROL SULFATE 1 DOSE: .5; 2.5 SOLUTION RESPIRATORY (INHALATION) at 20:10

## 2023-07-19 ASSESSMENT — PULMONARY FUNCTION TESTS
PIF_VALUE: 31
PIF_VALUE: 19
PIF_VALUE: 20
PIF_VALUE: 20
PIF_VALUE: 30
PIF_VALUE: 19
PIF_VALUE: 30
PIF_VALUE: 19
PIF_VALUE: 21
PIF_VALUE: 20
PIF_VALUE: 30
PIF_VALUE: 31
PIF_VALUE: 28
PIF_VALUE: 20
PIF_VALUE: 20
PIF_VALUE: 28
PIF_VALUE: 29
PIF_VALUE: 20
PIF_VALUE: 19
PIF_VALUE: 20
PIF_VALUE: 21
PIF_VALUE: 30
PIF_VALUE: 19
PIF_VALUE: 29
PIF_VALUE: 22
PIF_VALUE: 22

## 2023-07-19 ASSESSMENT — PAIN SCALES - GENERAL
PAINLEVEL_OUTOF10: 0

## 2023-07-19 NOTE — FLOWSHEET NOTE
Patient attempting to pull at lines/tubes when unrestrained. 07/19/23 0800   Restraint Order   Length of Order (Only Document With Each New Order) Continuous Until 2359 of Next Calendar Day   Attending Provider Notified (Only Document With Each New Order) Yes   Order Upon Application (Only Document With Each New Order) Yes   NV Length of Order 40   Restraint Type   Non-Violent Restraint Type from Order question Soft Restraint Bilateral Wrist   Soft Restraint Bilateral Wrist CONTINUED   Assessment   Less Restrictive Alternative 1:1 patient care;Repositioning; Re-evaluate equipment;Disguise equipment; Re-orientation;Verbal re-direction;Diversional activities   Special Consideration/Risk Factors None   Justification from Order   Clinical Justification Pulling lines tubes dressing equipment   Education   Discontinuation Criteria Absence of behavior that required restraint   Criteria Explained Yes   Patient's Response No evidence of learning   Family Notification Already completed   Restraint  Monitoring Q2 Hours   Continued Justification  Continues to meet order criteria   Visual/Safety Check  Agitated/Restless   Circulation No signs of injury   Range of Motion Performed   Fluids NPO   Food/Meal NPO   Elimination Yes   Reason Patient did not Eliminate Other (Comment)  (patient has external catheter in place)   Care Plan Interventions   Remains free of injury from restraints (restraint for interference with medical device) Determine that other, less restrictive measures have been tried or would not be effective before applying the restraint; Evaluate the patient's condition at the time of restraint application; Inform patient/family regarding the reason for restraint; Every 2 hours: Monitor safety, psychosocial status, comfort, nutrition and hydration

## 2023-07-19 NOTE — FLOWSHEET NOTE
Patient attempting to pull at lines/tubes when unrestrained. 07/19/23 1600   Restraint Order   Length of Order (Only Document With Each New Order) Continuous Until 2359 of Next Calendar Day   Attending Provider Notified (Only Document With Each New Order) Yes   Order Upon Application (Only Document With Each New Order) Yes   NV Length of Order 32   Restraint Type   Non-Violent Restraint Type from Order question Soft Restraint Bilateral Wrist   Soft Restraint Bilateral Wrist CONTINUED   Assessment   Less Restrictive Alternative 1:1 patient care;Repositioning; Re-evaluate equipment;Disguise equipment; Re-orientation;Verbal re-direction;Diversional activities   Special Consideration/Risk Factors None   Justification from Order   Clinical Justification Pulling lines tubes dressing equipment   Education   Discontinuation Criteria Absence of behavior that required restraint   Criteria Explained Yes   Patient's Response No evidence of learning   Family Notification Already completed   Restraint  Monitoring Q2 Hours   Continued Justification  Continues to meet order criteria   Visual/Safety Check  Sedated;Subdued   Circulation No signs of injury   Range of Motion Performed   Fluids NPO   Food/Meal NPO   Elimination Yes   Reason Patient did not Eliminate Other (Comment)  (patient incontinent. external catheter in place)   Care Plan Interventions   Remains free of injury from restraints (restraint for interference with medical device) Determine that other, less restrictive measures have been tried or would not be effective before applying the restraint; Evaluate the patient's condition at the time of restraint application; Every 2 hours: Monitor safety, psychosocial status, comfort, nutrition and hydration; Inform patient/family regarding the reason for restraint

## 2023-07-20 ENCOUNTER — APPOINTMENT (OUTPATIENT)
Dept: GENERAL RADIOLOGY | Age: 58
End: 2023-07-20
Payer: COMMERCIAL

## 2023-07-20 LAB
AADO2: 115 MMHG
AADO2: 152.8 MMHG
AADO2: 293.1 MMHG
ANION GAP SERPL CALCULATED.3IONS-SCNC: 16 MMOL/L (ref 7–16)
ANION GAP SERPL CALCULATED.3IONS-SCNC: 19 MMOL/L (ref 7–16)
B.E.: -0.8 MMOL/L (ref -3–3)
B.E.: -3 MMOL/L (ref -3–3)
B.E.: -4.9 MMOL/L (ref -3–3)
B.E.: -5.4 MMOL/L (ref -3–3)
BASOPHILS # BLD: 0.03 K/UL (ref 0–0.2)
BASOPHILS NFR BLD: 1 % (ref 0–2)
BNP SERPL-MCNC: 3546 PG/ML (ref 0–125)
BUN SERPL-MCNC: 19 MG/DL (ref 6–20)
BUN SERPL-MCNC: 19 MG/DL (ref 6–20)
CALCIUM SERPL-MCNC: 9 MG/DL (ref 8.6–10.2)
CALCIUM SERPL-MCNC: 9.8 MG/DL (ref 8.6–10.2)
CHLORIDE SERPL-SCNC: 104 MMOL/L (ref 98–107)
CHLORIDE SERPL-SCNC: 107 MMOL/L (ref 98–107)
CO2 SERPL-SCNC: 19 MMOL/L (ref 22–29)
CO2 SERPL-SCNC: 26 MMOL/L (ref 22–29)
COHB: 0.9 % (ref 0–1.5)
COHB: 0.9 % (ref 0–1.5)
COHB: 1 % (ref 0–1.5)
COHB: 1.3 % (ref 0–1.5)
CREAT SERPL-MCNC: 2 MG/DL (ref 0.7–1.2)
CREAT SERPL-MCNC: 2.1 MG/DL (ref 0.7–1.2)
CRITICAL: ABNORMAL
DATE ANALYZED: ABNORMAL
DATE OF COLLECTION: ABNORMAL
EOSINOPHIL # BLD: 0.25 K/UL (ref 0.05–0.5)
EOSINOPHILS RELATIVE PERCENT: 5 % (ref 0–6)
ERYTHROCYTE [DISTWIDTH] IN BLOOD BY AUTOMATED COUNT: 19.4 % (ref 11.5–15)
FIO2: 40 %
FIO2: 50 %
FIO2: 60 %
GFR SERPL CREATININE-BSD FRML MDRD: 37 ML/MIN/1.73M2
GFR SERPL CREATININE-BSD FRML MDRD: 37 ML/MIN/1.73M2
GLUCOSE SERPL-MCNC: 100 MG/DL (ref 74–99)
GLUCOSE SERPL-MCNC: 71 MG/DL (ref 74–99)
HCO3: 21.5 MMOL/L (ref 22–26)
HCO3: 21.5 MMOL/L (ref 22–26)
HCO3: 22.5 MMOL/L (ref 22–26)
HCO3: 24.7 MMOL/L (ref 22–26)
HCT VFR BLD AUTO: 27.9 % (ref 37–54)
HGB BLD-MCNC: 7.9 G/DL (ref 12.5–16.5)
HHB: 1.2 % (ref 0–5)
HHB: 1.6 % (ref 0–5)
HHB: 10.2 % (ref 0–5)
HHB: 6.8 % (ref 0–5)
IMM GRANULOCYTES # BLD AUTO: 0.03 K/UL (ref 0–0.58)
IMM GRANULOCYTES NFR BLD: 1 % (ref 0–5)
LAB: ABNORMAL
LYMPHOCYTES NFR BLD: 0.56 K/UL (ref 1.5–4)
LYMPHOCYTES RELATIVE PERCENT: 11 % (ref 20–42)
Lab: ABNORMAL
MCH RBC QN AUTO: 25.3 PG (ref 26–35)
MCHC RBC AUTO-ENTMCNC: 28.3 G/DL (ref 32–34.5)
MCV RBC AUTO: 89.4 FL (ref 80–99.9)
METER GLUCOSE: 85 MG/DL (ref 74–99)
METER GLUCOSE: 93 MG/DL (ref 74–99)
METHB: 0.3 % (ref 0–1.5)
METHB: 0.4 % (ref 0–1.5)
MODE: ABNORMAL
MODE: AC
MONOCYTES NFR BLD: 0.62 K/UL (ref 0.1–0.95)
MONOCYTES NFR BLD: 12 % (ref 2–12)
NEUTROPHILS NFR BLD: 71 % (ref 43–80)
NEUTS SEG NFR BLD: 3.73 K/UL (ref 1.8–7.3)
O2 CONTENT: 12.5 ML/DL
O2 CONTENT: 12.9 ML/DL
O2 CONTENT: 13 ML/DL
O2 CONTENT: 14 ML/DL
O2 SATURATION: 89.7 % (ref 92–98.5)
O2 SATURATION: 93.1 % (ref 92–98.5)
O2 SATURATION: 98.4 % (ref 92–98.5)
O2 SATURATION: 98.8 % (ref 92–98.5)
O2HB: 88.5 % (ref 94–97)
O2HB: 91.9 % (ref 94–97)
O2HB: 96.8 % (ref 94–97)
O2HB: 97.4 % (ref 94–97)
OPERATOR ID: 1868
OPERATOR ID: 8214
OPERATOR ID: 914
OPERATOR ID: ABNORMAL
PARTIAL THROMBOPLASTIN TIME: 55.8 SEC (ref 24.5–35.1)
PATIENT TEMP: 37 C
PCO2: 35.8 MMHG (ref 35–45)
PCO2: 44.6 MMHG (ref 35–45)
PCO2: 45.7 MMHG (ref 35–45)
PCO2: 56 MMHG (ref 35–45)
PEEP/CPAP: 8 CMH2O
PFO2: 1.16 MMHG/%
PFO2: 2.82 MMHG/%
PFO2: 2.97 MMHG/%
PH BLOOD GAS: 7.22 (ref 7.35–7.45)
PH BLOOD GAS: 7.29 (ref 7.35–7.45)
PH BLOOD GAS: 7.36 (ref 7.35–7.45)
PH BLOOD GAS: 7.4 (ref 7.35–7.45)
PLATELET, FLUORESCENCE: 75 K/UL (ref 130–450)
PMV BLD AUTO: 12.8 FL (ref 7–12)
PO2: 119 MMHG (ref 75–100)
PO2: 141 MMHG (ref 75–100)
PO2: 69.4 MMHG (ref 75–100)
PO2: 82.6 MMHG (ref 75–100)
POTASSIUM SERPL-SCNC: 3.9 MMOL/L (ref 3.5–5)
POTASSIUM SERPL-SCNC: 3.96 MMOL/L (ref 3.5–5)
POTASSIUM SERPL-SCNC: 4.6 MMOL/L (ref 3.5–5)
RBC # BLD AUTO: 3.12 M/UL (ref 3.8–5.8)
RI(T): 0.97
RI(T): 1.08
RI(T): 4.22
RR MECHANICAL: 18 B/MIN
RR MECHANICAL: 22 B/MIN
RR MECHANICAL: 22 B/MIN
SODIUM SERPL-SCNC: 145 MMOL/L (ref 132–146)
SODIUM SERPL-SCNC: 146 MMOL/L (ref 132–146)
SOURCE, BLOOD GAS: ABNORMAL
THB: 10 G/DL (ref 11.5–16.5)
THB: 9.3 G/DL (ref 11.5–16.5)
TIME ANALYZED: 1053
TIME ANALYZED: 1229
TIME ANALYZED: 1759
TIME ANALYZED: 427
TROPONIN I SERPL HS-MCNC: 33 NG/L (ref 0–11)
VT MECHANICAL: 450 ML
VT MECHANICAL: 450 ML
VT MECHANICAL: 480 ML
WBC OTHER # BLD: 5.2 K/UL (ref 4.5–11.5)

## 2023-07-20 PROCEDURE — 82805 BLOOD GASES W/O2 SATURATION: CPT

## 2023-07-20 PROCEDURE — 99291 CRITICAL CARE FIRST HOUR: CPT | Performed by: INTERNAL MEDICINE

## 2023-07-20 PROCEDURE — 83880 ASSAY OF NATRIURETIC PEPTIDE: CPT

## 2023-07-20 PROCEDURE — 2580000003 HC RX 258: Performed by: INTERNAL MEDICINE

## 2023-07-20 PROCEDURE — 85027 COMPLETE CBC AUTOMATED: CPT

## 2023-07-20 PROCEDURE — A4216 STERILE WATER/SALINE, 10 ML: HCPCS | Performed by: INTERNAL MEDICINE

## 2023-07-20 PROCEDURE — C9113 INJ PANTOPRAZOLE SODIUM, VIA: HCPCS | Performed by: INTERNAL MEDICINE

## 2023-07-20 PROCEDURE — 6360000002 HC RX W HCPCS: Performed by: INTERNAL MEDICINE

## 2023-07-20 PROCEDURE — 94640 AIRWAY INHALATION TREATMENT: CPT

## 2023-07-20 PROCEDURE — 94669 MECHANICAL CHEST WALL OSCILL: CPT

## 2023-07-20 PROCEDURE — 94003 VENT MGMT INPAT SUBQ DAY: CPT

## 2023-07-20 PROCEDURE — 84484 ASSAY OF TROPONIN QUANT: CPT

## 2023-07-20 PROCEDURE — 84132 ASSAY OF SERUM POTASSIUM: CPT

## 2023-07-20 PROCEDURE — 51702 INSERT TEMP BLADDER CATH: CPT

## 2023-07-20 PROCEDURE — 80048 BASIC METABOLIC PNL TOTAL CA: CPT

## 2023-07-20 PROCEDURE — 2000000000 HC ICU R&B

## 2023-07-20 PROCEDURE — 71045 X-RAY EXAM CHEST 1 VIEW: CPT

## 2023-07-20 PROCEDURE — 6370000000 HC RX 637 (ALT 250 FOR IP): Performed by: INTERNAL MEDICINE

## 2023-07-20 PROCEDURE — 82947 ASSAY GLUCOSE BLOOD QUANT: CPT

## 2023-07-20 PROCEDURE — 2500000003 HC RX 250 WO HCPCS: Performed by: INTERNAL MEDICINE

## 2023-07-20 PROCEDURE — 2700000000 HC OXYGEN THERAPY PER DAY

## 2023-07-20 PROCEDURE — 85730 THROMBOPLASTIN TIME PARTIAL: CPT

## 2023-07-20 PROCEDURE — 94660 CPAP INITIATION&MGMT: CPT

## 2023-07-20 RX ORDER — DEXTROSE MONOHYDRATE 50 MG/ML
INJECTION, SOLUTION INTRAVENOUS CONTINUOUS
Status: DISCONTINUED | OUTPATIENT
Start: 2023-07-20 | End: 2023-07-20

## 2023-07-20 RX ORDER — FUROSEMIDE 10 MG/ML
80 INJECTION INTRAMUSCULAR; INTRAVENOUS ONCE
Status: COMPLETED | OUTPATIENT
Start: 2023-07-20 | End: 2023-07-20

## 2023-07-20 RX ORDER — METOPROLOL TARTRATE 5 MG/5ML
5 INJECTION INTRAVENOUS EVERY 6 HOURS PRN
Status: DISPENSED | OUTPATIENT
Start: 2023-07-20 | End: 2023-07-23

## 2023-07-20 RX ADMIN — SODIUM CHLORIDE 3000 MG: 9 INJECTION, SOLUTION INTRAVENOUS at 16:16

## 2023-07-20 RX ADMIN — ACETYLCYSTEINE 600 MG: 100 SOLUTION ORAL; RESPIRATORY (INHALATION) at 11:58

## 2023-07-20 RX ADMIN — IPRATROPIUM BROMIDE AND ALBUTEROL SULFATE 1 DOSE: .5; 2.5 SOLUTION RESPIRATORY (INHALATION) at 11:58

## 2023-07-20 RX ADMIN — SODIUM CHLORIDE, PRESERVATIVE FREE 40 MG: 5 INJECTION INTRAVENOUS at 08:13

## 2023-07-20 RX ADMIN — IPRATROPIUM BROMIDE AND ALBUTEROL SULFATE 1 DOSE: .5; 2.5 SOLUTION RESPIRATORY (INHALATION) at 07:43

## 2023-07-20 RX ADMIN — ACETYLCYSTEINE 600 MG: 100 SOLUTION ORAL; RESPIRATORY (INHALATION) at 20:16

## 2023-07-20 RX ADMIN — DEXTROSE MONOHYDRATE: 50 INJECTION, SOLUTION INTRAVENOUS at 11:03

## 2023-07-20 RX ADMIN — ACETYLCYSTEINE 600 MG: 100 SOLUTION ORAL; RESPIRATORY (INHALATION) at 15:48

## 2023-07-20 RX ADMIN — ACETYLCYSTEINE 600 MG: 100 SOLUTION ORAL; RESPIRATORY (INHALATION) at 03:58

## 2023-07-20 RX ADMIN — FUROSEMIDE 80 MG: 10 INJECTION, SOLUTION INTRAMUSCULAR; INTRAVENOUS at 10:44

## 2023-07-20 RX ADMIN — IPRATROPIUM BROMIDE AND ALBUTEROL SULFATE 1 DOSE: .5; 2.5 SOLUTION RESPIRATORY (INHALATION) at 03:58

## 2023-07-20 RX ADMIN — SODIUM CHLORIDE 3000 MG: 9 INJECTION, SOLUTION INTRAVENOUS at 02:56

## 2023-07-20 RX ADMIN — SODIUM CHLORIDE, POTASSIUM CHLORIDE, SODIUM LACTATE AND CALCIUM CHLORIDE: 600; 310; 30; 20 INJECTION, SOLUTION INTRAVENOUS at 02:56

## 2023-07-20 RX ADMIN — SODIUM CHLORIDE, PRESERVATIVE FREE 10 ML: 5 INJECTION INTRAVENOUS at 20:40

## 2023-07-20 RX ADMIN — LEVETIRACETAM 1000 MG: 100 INJECTION INTRAVENOUS at 02:52

## 2023-07-20 RX ADMIN — THIAMINE HYDROCHLORIDE 100 MG: 100 INJECTION, SOLUTION INTRAMUSCULAR; INTRAVENOUS at 08:14

## 2023-07-20 RX ADMIN — SUCRALFATE 1 G: 1 TABLET ORAL at 06:15

## 2023-07-20 RX ADMIN — METOPROLOL TARTRATE 5 MG: 1 INJECTION, SOLUTION INTRAVENOUS at 09:26

## 2023-07-20 RX ADMIN — SODIUM CHLORIDE 3000 MG: 9 INJECTION, SOLUTION INTRAVENOUS at 08:52

## 2023-07-20 RX ADMIN — IPRATROPIUM BROMIDE AND ALBUTEROL SULFATE 1 DOSE: .5; 2.5 SOLUTION RESPIRATORY (INHALATION) at 20:16

## 2023-07-20 RX ADMIN — LEVETIRACETAM 1000 MG: 100 INJECTION INTRAVENOUS at 16:06

## 2023-07-20 RX ADMIN — ACETYLCYSTEINE 600 MG: 100 SOLUTION ORAL; RESPIRATORY (INHALATION) at 07:42

## 2023-07-20 RX ADMIN — METOPROLOL TARTRATE 5 MG: 1 INJECTION, SOLUTION INTRAVENOUS at 17:33

## 2023-07-20 RX ADMIN — IPRATROPIUM BROMIDE AND ALBUTEROL SULFATE 1 DOSE: .5; 2.5 SOLUTION RESPIRATORY (INHALATION) at 15:48

## 2023-07-20 RX ADMIN — SODIUM CHLORIDE 3000 MG: 9 INJECTION, SOLUTION INTRAVENOUS at 20:51

## 2023-07-20 RX ADMIN — Medication 10 ML: at 20:40

## 2023-07-20 RX ADMIN — HEPARIN SODIUM 11 UNITS/KG/HR: 10000 INJECTION, SOLUTION INTRAVENOUS at 08:13

## 2023-07-20 ASSESSMENT — PULMONARY FUNCTION TESTS
PIF_VALUE: 20
PIF_VALUE: 21
PIF_VALUE: 30
PIF_VALUE: 30
PIF_VALUE: 34
PIF_VALUE: 31
PIF_VALUE: 32
PIF_VALUE: 34
PIF_VALUE: 33

## 2023-07-20 ASSESSMENT — PAIN SCALES - GENERAL
PAINLEVEL_OUTOF10: 0
PAINLEVEL_OUTOF10: 2

## 2023-07-20 NOTE — CARE COORDINATION
LOS 6 day, Peer recovery following, on Avaps. [email or web address], Heparin gtt, Leatha Mejia. Renal following for FRANTZ. Lasix 80 x 1 today, continue to monitor renal function. will follow for transition of care needs    Electronically signed by Karan Loja RN on 7/20/2023 at 3:43 PM

## 2023-07-20 NOTE — CARE COORDINATION
Care Coordination LOS 6 day, Peer recovery following, on Avaps. [email or web address], Heparin gtt, Fátima Lance. Renal following for FRANTZ. Lasix 80 x 1 today, continue to monitor renal function. will follow for transition of care needs    Electronically signed by Keenan Gambino RN on 7/20/2023 at 3:42 PM

## 2023-07-21 LAB
AADO2: 180.4 MMHG
ALBUMIN SERPL-MCNC: 4.5 G/DL (ref 3.5–5.2)
ALP SERPL-CCNC: 133 U/L (ref 40–129)
ALT SERPL-CCNC: 9 U/L (ref 0–40)
AMMONIA PLAS-SCNC: 25 UMOL/L (ref 16–60)
ANION GAP SERPL CALCULATED.3IONS-SCNC: 12 MMOL/L (ref 7–16)
ANION GAP SERPL CALCULATED.3IONS-SCNC: 15 MMOL/L (ref 7–16)
AST SERPL-CCNC: 24 U/L (ref 0–39)
B.E.: 0.4 MMOL/L (ref -3–3)
BASOPHILS # BLD: 0.03 K/UL (ref 0–0.2)
BASOPHILS NFR BLD: 0 % (ref 0–2)
BILIRUB SERPL-MCNC: 1 MG/DL (ref 0–1.2)
BNP SERPL-MCNC: 5886 PG/ML (ref 0–125)
BUN SERPL-MCNC: 18 MG/DL (ref 6–20)
BUN SERPL-MCNC: 18 MG/DL (ref 6–20)
CALCIUM SERPL-MCNC: 9.5 MG/DL (ref 8.6–10.2)
CALCIUM SERPL-MCNC: 9.9 MG/DL (ref 8.6–10.2)
CHLORIDE SERPL-SCNC: 105 MMOL/L (ref 98–107)
CHLORIDE SERPL-SCNC: 106 MMOL/L (ref 98–107)
CO2 SERPL-SCNC: 27 MMOL/L (ref 22–29)
CO2 SERPL-SCNC: 29 MMOL/L (ref 22–29)
COHB: 1.1 % (ref 0–1.5)
CREAT SERPL-MCNC: 1.8 MG/DL (ref 0.7–1.2)
CREAT SERPL-MCNC: 1.9 MG/DL (ref 0.7–1.2)
CRITICAL: ABNORMAL
DATE ANALYZED: ABNORMAL
DATE OF COLLECTION: ABNORMAL
EOSINOPHIL # BLD: 0.27 K/UL (ref 0.05–0.5)
EOSINOPHILS RELATIVE PERCENT: 4 % (ref 0–6)
ERYTHROCYTE [DISTWIDTH] IN BLOOD BY AUTOMATED COUNT: 18.9 % (ref 11.5–15)
FIO2: 50 %
GFR SERPL CREATININE-BSD FRML MDRD: 41 ML/MIN/1.73M2
GFR SERPL CREATININE-BSD FRML MDRD: 45 ML/MIN/1.73M2
GLUCOSE SERPL-MCNC: 103 MG/DL (ref 74–99)
GLUCOSE SERPL-MCNC: 95 MG/DL (ref 74–99)
HCO3: 26.6 MMOL/L (ref 22–26)
HCT VFR BLD AUTO: 29.7 % (ref 37–54)
HGB BLD-MCNC: 8.4 G/DL (ref 12.5–16.5)
HHB: 2.5 % (ref 0–5)
IMM GRANULOCYTES # BLD AUTO: 0.06 K/UL (ref 0–0.58)
IMM GRANULOCYTES NFR BLD: 1 % (ref 0–5)
LAB: ABNORMAL
LYMPHOCYTES NFR BLD: 0.77 K/UL (ref 1.5–4)
LYMPHOCYTES RELATIVE PERCENT: 11 % (ref 20–42)
Lab: ABNORMAL
MCH RBC QN AUTO: 25 PG (ref 26–35)
MCHC RBC AUTO-ENTMCNC: 28.3 G/DL (ref 32–34.5)
MCV RBC AUTO: 88.4 FL (ref 80–99.9)
METER GLUCOSE: 111 MG/DL (ref 74–99)
METHB: 0.5 % (ref 0–1.5)
MICROORGANISM SPEC CULT: ABNORMAL
MICROORGANISM/AGENT SPEC: ABNORMAL
MODE: ABNORMAL
MONOCYTES NFR BLD: 1.1 K/UL (ref 0.1–0.95)
MONOCYTES NFR BLD: 16 % (ref 2–12)
NEUTROPHILS NFR BLD: 69 % (ref 43–80)
NEUTS SEG NFR BLD: 4.85 K/UL (ref 1.8–7.3)
O2 CONTENT: 13.4 ML/DL
O2 SATURATION: 97.5 % (ref 92–98.5)
O2HB: 95.9 % (ref 94–97)
OPERATOR ID: 2593
PARTIAL THROMBOPLASTIN TIME: 51.9 SEC (ref 24.5–35.1)
PATIENT TEMP: 37 C
PCO2: 50 MMHG (ref 35–45)
PEEP/CPAP: 8 CMH2O
PFO2: 2.15 MMHG/%
PH BLOOD GAS: 7.34 (ref 7.35–7.45)
PLATELET CONFIRMATION: NORMAL
PLATELET CONFIRMATION: NORMAL
PLATELET, FLUORESCENCE: 77 K/UL (ref 130–450)
PMV BLD AUTO: ABNORMAL FL (ref 7–12)
PO2: 107.4 MMHG (ref 75–100)
POTASSIUM SERPL-SCNC: 3.8 MMOL/L (ref 3.5–5)
POTASSIUM SERPL-SCNC: 3.9 MMOL/L (ref 3.5–5)
PROCALCITONIN SERPL-MCNC: 0.19 NG/ML (ref 0–0.08)
PROT SERPL-MCNC: 7.1 G/DL (ref 6.4–8.3)
RBC # BLD AUTO: 3.36 M/UL (ref 3.8–5.8)
RI(T): 1.68
RR MECHANICAL: 22 B/MIN
SODIUM SERPL-SCNC: 146 MMOL/L (ref 132–146)
SODIUM SERPL-SCNC: 148 MMOL/L (ref 132–146)
SOURCE, BLOOD GAS: ABNORMAL
SPECIMEN DESCRIPTION: ABNORMAL
THB: 9.8 G/DL (ref 11.5–16.5)
TIME ANALYZED: 446
VT MECHANICAL: 450 ML
WBC OTHER # BLD: 7.1 K/UL (ref 4.5–11.5)

## 2023-07-21 PROCEDURE — 2500000003 HC RX 250 WO HCPCS: Performed by: INTERNAL MEDICINE

## 2023-07-21 PROCEDURE — 85730 THROMBOPLASTIN TIME PARTIAL: CPT

## 2023-07-21 PROCEDURE — 6360000002 HC RX W HCPCS: Performed by: INTERNAL MEDICINE

## 2023-07-21 PROCEDURE — 2580000003 HC RX 258

## 2023-07-21 PROCEDURE — 84145 PROCALCITONIN (PCT): CPT

## 2023-07-21 PROCEDURE — 99291 CRITICAL CARE FIRST HOUR: CPT | Performed by: INTERNAL MEDICINE

## 2023-07-21 PROCEDURE — 99223 1ST HOSP IP/OBS HIGH 75: CPT | Performed by: CLINICAL NURSE SPECIALIST

## 2023-07-21 PROCEDURE — 6370000000 HC RX 637 (ALT 250 FOR IP): Performed by: INTERNAL MEDICINE

## 2023-07-21 PROCEDURE — C9113 INJ PANTOPRAZOLE SODIUM, VIA: HCPCS | Performed by: INTERNAL MEDICINE

## 2023-07-21 PROCEDURE — 2000000000 HC ICU R&B

## 2023-07-21 PROCEDURE — 94669 MECHANICAL CHEST WALL OSCILL: CPT

## 2023-07-21 PROCEDURE — 85027 COMPLETE CBC AUTOMATED: CPT

## 2023-07-21 PROCEDURE — 80048 BASIC METABOLIC PNL TOTAL CA: CPT

## 2023-07-21 PROCEDURE — 83880 ASSAY OF NATRIURETIC PEPTIDE: CPT

## 2023-07-21 PROCEDURE — 82805 BLOOD GASES W/O2 SATURATION: CPT

## 2023-07-21 PROCEDURE — 80053 COMPREHEN METABOLIC PANEL: CPT

## 2023-07-21 PROCEDURE — 2700000000 HC OXYGEN THERAPY PER DAY

## 2023-07-21 PROCEDURE — 94660 CPAP INITIATION&MGMT: CPT

## 2023-07-21 PROCEDURE — 94640 AIRWAY INHALATION TREATMENT: CPT

## 2023-07-21 PROCEDURE — A4216 STERILE WATER/SALINE, 10 ML: HCPCS | Performed by: INTERNAL MEDICINE

## 2023-07-21 PROCEDURE — 2580000003 HC RX 258: Performed by: INTERNAL MEDICINE

## 2023-07-21 PROCEDURE — 82947 ASSAY GLUCOSE BLOOD QUANT: CPT

## 2023-07-21 PROCEDURE — 92610 EVALUATE SWALLOWING FUNCTION: CPT

## 2023-07-21 PROCEDURE — 82140 ASSAY OF AMMONIA: CPT

## 2023-07-21 RX ORDER — POTASSIUM CHLORIDE 7.45 MG/ML
10 INJECTION INTRAVENOUS
Status: COMPLETED | OUTPATIENT
Start: 2023-07-21 | End: 2023-07-21

## 2023-07-21 RX ORDER — DEXTROSE MONOHYDRATE 50 MG/ML
INJECTION, SOLUTION INTRAVENOUS CONTINUOUS
Status: ACTIVE | OUTPATIENT
Start: 2023-07-21 | End: 2023-07-22

## 2023-07-21 RX ORDER — WARFARIN SODIUM 5 MG/1
5 TABLET ORAL DAILY
Status: DISCONTINUED | OUTPATIENT
Start: 2023-07-21 | End: 2023-07-22

## 2023-07-21 RX ADMIN — IPRATROPIUM BROMIDE AND ALBUTEROL SULFATE 1 DOSE: .5; 2.5 SOLUTION RESPIRATORY (INHALATION) at 00:00

## 2023-07-21 RX ADMIN — THIAMINE HYDROCHLORIDE 500 MG: 100 INJECTION, SOLUTION INTRAMUSCULAR; INTRAVENOUS at 15:56

## 2023-07-21 RX ADMIN — SODIUM CHLORIDE 3000 MG: 9 INJECTION, SOLUTION INTRAVENOUS at 02:35

## 2023-07-21 RX ADMIN — THIAMINE HYDROCHLORIDE 500 MG: 100 INJECTION, SOLUTION INTRAMUSCULAR; INTRAVENOUS at 11:54

## 2023-07-21 RX ADMIN — SODIUM CHLORIDE 3000 MG: 9 INJECTION, SOLUTION INTRAVENOUS at 10:30

## 2023-07-21 RX ADMIN — METOPROLOL TARTRATE 5 MG: 1 INJECTION, SOLUTION INTRAVENOUS at 13:27

## 2023-07-21 RX ADMIN — POTASSIUM CHLORIDE 10 MEQ: 7.46 INJECTION, SOLUTION INTRAVENOUS at 12:20

## 2023-07-21 RX ADMIN — DEXTROSE MONOHYDRATE: 50 INJECTION, SOLUTION INTRAVENOUS at 13:31

## 2023-07-21 RX ADMIN — WARFARIN SODIUM 5 MG: 5 TABLET ORAL at 18:26

## 2023-07-21 RX ADMIN — ACETYLCYSTEINE 600 MG: 100 SOLUTION ORAL; RESPIRATORY (INHALATION) at 08:54

## 2023-07-21 RX ADMIN — ACETYLCYSTEINE 600 MG: 100 SOLUTION ORAL; RESPIRATORY (INHALATION) at 20:17

## 2023-07-21 RX ADMIN — POTASSIUM CHLORIDE 10 MEQ: 7.46 INJECTION, SOLUTION INTRAVENOUS at 14:29

## 2023-07-21 RX ADMIN — SODIUM CHLORIDE, PRESERVATIVE FREE 40 MG: 5 INJECTION INTRAVENOUS at 10:30

## 2023-07-21 RX ADMIN — ACETYLCYSTEINE 600 MG: 100 SOLUTION ORAL; RESPIRATORY (INHALATION) at 11:22

## 2023-07-21 RX ADMIN — AMPICILLIN SODIUM AND SULBACTAM SODIUM 3000 MG: 2; 1 INJECTION, POWDER, FOR SOLUTION INTRAMUSCULAR; INTRAVENOUS at 21:40

## 2023-07-21 RX ADMIN — ACETYLCYSTEINE 600 MG: 100 SOLUTION ORAL; RESPIRATORY (INHALATION) at 17:03

## 2023-07-21 RX ADMIN — LEVETIRACETAM 1000 MG: 100 INJECTION INTRAVENOUS at 02:31

## 2023-07-21 RX ADMIN — LEVETIRACETAM 1000 MG: 100 INJECTION INTRAVENOUS at 14:24

## 2023-07-21 RX ADMIN — AMPICILLIN SODIUM AND SULBACTAM SODIUM 3000 MG: 2; 1 INJECTION, POWDER, FOR SOLUTION INTRAMUSCULAR; INTRAVENOUS at 17:42

## 2023-07-21 RX ADMIN — ACETYLCYSTEINE 600 MG: 100 SOLUTION ORAL; RESPIRATORY (INHALATION) at 00:00

## 2023-07-21 RX ADMIN — POTASSIUM CHLORIDE 10 MEQ: 7.46 INJECTION, SOLUTION INTRAVENOUS at 13:26

## 2023-07-21 RX ADMIN — DEXTROSE MONOHYDRATE: 50 INJECTION, SOLUTION INTRAVENOUS at 19:37

## 2023-07-21 RX ADMIN — IPRATROPIUM BROMIDE AND ALBUTEROL SULFATE 1 DOSE: .5; 2.5 SOLUTION RESPIRATORY (INHALATION) at 04:04

## 2023-07-21 RX ADMIN — ACETYLCYSTEINE 600 MG: 100 SOLUTION ORAL; RESPIRATORY (INHALATION) at 23:41

## 2023-07-21 RX ADMIN — THIAMINE HYDROCHLORIDE 500 MG: 100 INJECTION, SOLUTION INTRAMUSCULAR; INTRAVENOUS at 21:44

## 2023-07-21 RX ADMIN — HEPARIN SODIUM 11 UNITS/KG/HR: 10000 INJECTION, SOLUTION INTRAVENOUS at 05:56

## 2023-07-21 RX ADMIN — SUCRALFATE 1 G: 1 TABLET ORAL at 18:25

## 2023-07-21 RX ADMIN — IPRATROPIUM BROMIDE AND ALBUTEROL SULFATE 1 DOSE: .5; 2.5 SOLUTION RESPIRATORY (INHALATION) at 23:41

## 2023-07-21 RX ADMIN — IPRATROPIUM BROMIDE AND ALBUTEROL SULFATE 1 DOSE: .5; 2.5 SOLUTION RESPIRATORY (INHALATION) at 17:03

## 2023-07-21 RX ADMIN — IPRATROPIUM BROMIDE AND ALBUTEROL SULFATE 1 DOSE: .5; 2.5 SOLUTION RESPIRATORY (INHALATION) at 11:22

## 2023-07-21 RX ADMIN — IPRATROPIUM BROMIDE AND ALBUTEROL SULFATE 1 DOSE: .5; 2.5 SOLUTION RESPIRATORY (INHALATION) at 20:17

## 2023-07-21 RX ADMIN — IPRATROPIUM BROMIDE AND ALBUTEROL SULFATE 1 DOSE: .5; 2.5 SOLUTION RESPIRATORY (INHALATION) at 08:54

## 2023-07-21 RX ADMIN — POTASSIUM CHLORIDE 10 MEQ: 7.46 INJECTION, SOLUTION INTRAVENOUS at 11:03

## 2023-07-21 RX ADMIN — ACETYLCYSTEINE 600 MG: 100 SOLUTION ORAL; RESPIRATORY (INHALATION) at 04:04

## 2023-07-21 ASSESSMENT — PAIN SCALES - GENERAL
PAINLEVEL_OUTOF10: 0

## 2023-07-21 NOTE — CARE COORDINATION
Peer Recovery Support Note    Name: Asad Ray  Date: 7/21/2023    Chief Complaint   Patient presents with    Alcohol Problem     Patient states he is going through alcohol withdrawal and had 2 seizures and fall today . +hit head +blood thinners . Last drink this am       Peer Support met with patient. [x] Support and education provided  [] Resources provided   [] Treatment referral:   [] Other:   [] Patient declined peer recovery services     Referred By: Margo Simpson RN (CM)    Notes: Went to see pt in hospital room bedside this afternoon. Pt is a heavy drinker and has a lot of health problems to show for. Pt had been intubated since 7/14/2023 and was extubated 7/20/2023. Pt could not talk yet so peer will follow this pt progress.     Signed: Cherylene Novel, 7/21/2023

## 2023-07-22 ENCOUNTER — APPOINTMENT (OUTPATIENT)
Dept: GENERAL RADIOLOGY | Age: 58
End: 2023-07-22
Payer: COMMERCIAL

## 2023-07-22 PROBLEM — F10.930 ALCOHOL WITHDRAWAL SYNDROME WITHOUT COMPLICATION (HCC): Status: ACTIVE | Noted: 2023-07-14

## 2023-07-22 LAB
AADO2: 131 MMHG
ALBUMIN SERPL-MCNC: 4.2 G/DL (ref 3.5–5.2)
ALP SERPL-CCNC: 155 U/L (ref 40–129)
ALT SERPL-CCNC: 9 U/L (ref 0–40)
ANION GAP SERPL CALCULATED.3IONS-SCNC: 10 MMOL/L (ref 7–16)
ANION GAP SERPL CALCULATED.3IONS-SCNC: 12 MMOL/L (ref 7–16)
AST SERPL-CCNC: 23 U/L (ref 0–39)
B.E.: 3.1 MMOL/L (ref -3–3)
BASOPHILS # BLD: 0.04 K/UL (ref 0–0.2)
BASOPHILS NFR BLD: 1 % (ref 0–2)
BILIRUB SERPL-MCNC: 0.8 MG/DL (ref 0–1.2)
BNP SERPL-MCNC: 6252 PG/ML (ref 0–125)
BUN SERPL-MCNC: 14 MG/DL (ref 6–20)
BUN SERPL-MCNC: 16 MG/DL (ref 6–20)
CALCIUM SERPL-MCNC: 9.1 MG/DL (ref 8.6–10.2)
CALCIUM SERPL-MCNC: 9.8 MG/DL (ref 8.6–10.2)
CHLORIDE SERPL-SCNC: 108 MMOL/L (ref 98–107)
CHLORIDE SERPL-SCNC: 109 MMOL/L (ref 98–107)
CO2 SERPL-SCNC: 27 MMOL/L (ref 22–29)
CO2 SERPL-SCNC: 27 MMOL/L (ref 22–29)
COHB: 1 % (ref 0–1.5)
CREAT SERPL-MCNC: 1.6 MG/DL (ref 0.7–1.2)
CREAT SERPL-MCNC: 1.7 MG/DL (ref 0.7–1.2)
CRITICAL: ABNORMAL
DATE ANALYZED: ABNORMAL
DATE OF COLLECTION: ABNORMAL
EOSINOPHIL # BLD: 0.21 K/UL (ref 0.05–0.5)
EOSINOPHILS RELATIVE PERCENT: 4 % (ref 0–6)
ERYTHROCYTE [DISTWIDTH] IN BLOOD BY AUTOMATED COUNT: 19.1 % (ref 11.5–15)
FIO2: 50 %
GFR SERPL CREATININE-BSD FRML MDRD: 47 ML/MIN/1.73M2
GFR SERPL CREATININE-BSD FRML MDRD: 48 ML/MIN/1.73M2
GLUCOSE SERPL-MCNC: 100 MG/DL (ref 74–99)
GLUCOSE SERPL-MCNC: 102 MG/DL (ref 74–99)
HCO3: 28.7 MMOL/L (ref 22–26)
HCT VFR BLD AUTO: 28.8 % (ref 37–54)
HGB BLD-MCNC: 7.9 G/DL (ref 12.5–16.5)
HHB: 0.8 % (ref 0–5)
IMM GRANULOCYTES # BLD AUTO: 0.05 K/UL (ref 0–0.58)
IMM GRANULOCYTES NFR BLD: 1 % (ref 0–5)
INR PPP: 1.9
LAB: ABNORMAL
LYMPHOCYTES NFR BLD: 0.96 K/UL (ref 1.5–4)
LYMPHOCYTES RELATIVE PERCENT: 17 % (ref 20–42)
Lab: ABNORMAL
MAGNESIUM SERPL-MCNC: 1.7 MG/DL (ref 1.6–2.6)
MCH RBC QN AUTO: 25.1 PG (ref 26–35)
MCHC RBC AUTO-ENTMCNC: 27.4 G/DL (ref 32–34.5)
MCV RBC AUTO: 91.4 FL (ref 80–99.9)
METHB: 0.4 % (ref 0–1.5)
MODE: ABNORMAL
MONOCYTES NFR BLD: 1.04 K/UL (ref 0.1–0.95)
MONOCYTES NFR BLD: 18 % (ref 2–12)
NEUTROPHILS NFR BLD: 60 % (ref 43–80)
NEUTS SEG NFR BLD: 3.4 K/UL (ref 1.8–7.3)
O2 CONTENT: 13 ML/DL
O2 SATURATION: 99.2 % (ref 92–98.5)
O2HB: 97.8 % (ref 94–97)
OPERATOR ID: 2577
PARTIAL THROMBOPLASTIN TIME: 47 SEC (ref 24.5–35.1)
PATIENT TEMP: 37 C
PCO2: 49.2 MMHG (ref 35–45)
PEEP/CPAP: 8 CMH2O
PFO2: 3.15 MMHG/%
PH BLOOD GAS: 7.38 (ref 7.35–7.45)
PLATELET CONFIRMATION: NORMAL
PLATELET, FLUORESCENCE: 74 K/UL (ref 130–450)
PMV BLD AUTO: ABNORMAL FL (ref 7–12)
PO2: 157.7 MMHG (ref 75–100)
POTASSIUM SERPL-SCNC: 3.3 MMOL/L (ref 3.5–5)
POTASSIUM SERPL-SCNC: 3.9 MMOL/L (ref 3.5–5)
PROT SERPL-MCNC: 6.8 G/DL (ref 6.4–8.3)
PROTHROMBIN TIME: 20.2 SEC (ref 9.3–12.4)
RBC # BLD AUTO: 3.15 M/UL (ref 3.8–5.8)
RBC # BLD: ABNORMAL 10*6/UL
RI(T): 0.83
RR MECHANICAL: 22 B/MIN
SODIUM SERPL-SCNC: 146 MMOL/L (ref 132–146)
SODIUM SERPL-SCNC: 147 MMOL/L (ref 132–146)
SOURCE, BLOOD GAS: ABNORMAL
THB: 9.2 G/DL (ref 11.5–16.5)
TIME ANALYZED: 510
VT MECHANICAL: 450 ML
WBC OTHER # BLD: 5.7 K/UL (ref 4.5–11.5)

## 2023-07-22 PROCEDURE — 85027 COMPLETE CBC AUTOMATED: CPT

## 2023-07-22 PROCEDURE — 2000000000 HC ICU R&B

## 2023-07-22 PROCEDURE — 6370000000 HC RX 637 (ALT 250 FOR IP): Performed by: INTERNAL MEDICINE

## 2023-07-22 PROCEDURE — 83880 ASSAY OF NATRIURETIC PEPTIDE: CPT

## 2023-07-22 PROCEDURE — A4216 STERILE WATER/SALINE, 10 ML: HCPCS | Performed by: INTERNAL MEDICINE

## 2023-07-22 PROCEDURE — 2580000003 HC RX 258

## 2023-07-22 PROCEDURE — 6360000002 HC RX W HCPCS: Performed by: INTERNAL MEDICINE

## 2023-07-22 PROCEDURE — 99231 SBSQ HOSP IP/OBS SF/LOW 25: CPT | Performed by: CLINICAL NURSE SPECIALIST

## 2023-07-22 PROCEDURE — 71045 X-RAY EXAM CHEST 1 VIEW: CPT

## 2023-07-22 PROCEDURE — 2580000003 HC RX 258: Performed by: INTERNAL MEDICINE

## 2023-07-22 PROCEDURE — 85610 PROTHROMBIN TIME: CPT

## 2023-07-22 PROCEDURE — 94640 AIRWAY INHALATION TREATMENT: CPT

## 2023-07-22 PROCEDURE — 94660 CPAP INITIATION&MGMT: CPT

## 2023-07-22 PROCEDURE — 83735 ASSAY OF MAGNESIUM: CPT

## 2023-07-22 PROCEDURE — 2700000000 HC OXYGEN THERAPY PER DAY

## 2023-07-22 PROCEDURE — 99291 CRITICAL CARE FIRST HOUR: CPT | Performed by: INTERNAL MEDICINE

## 2023-07-22 PROCEDURE — C9113 INJ PANTOPRAZOLE SODIUM, VIA: HCPCS | Performed by: INTERNAL MEDICINE

## 2023-07-22 PROCEDURE — 82805 BLOOD GASES W/O2 SATURATION: CPT

## 2023-07-22 PROCEDURE — 80048 BASIC METABOLIC PNL TOTAL CA: CPT

## 2023-07-22 PROCEDURE — 80053 COMPREHEN METABOLIC PANEL: CPT

## 2023-07-22 PROCEDURE — 85730 THROMBOPLASTIN TIME PARTIAL: CPT

## 2023-07-22 RX ORDER — WARFARIN SODIUM 5 MG/1
2.5 TABLET ORAL
Status: COMPLETED | OUTPATIENT
Start: 2023-07-22 | End: 2023-07-22

## 2023-07-22 RX ORDER — ACETYLCYSTEINE 100 MG/ML
600 SOLUTION ORAL; RESPIRATORY (INHALATION) EVERY 4 HOURS PRN
Status: DISCONTINUED | OUTPATIENT
Start: 2023-07-22 | End: 2023-07-30 | Stop reason: HOSPADM

## 2023-07-22 RX ORDER — MULTIVIT WITH IRON,MINERALS
15 LIQUID (ML) ORAL DAILY
Status: DISCONTINUED | OUTPATIENT
Start: 2023-07-22 | End: 2023-07-30 | Stop reason: HOSPADM

## 2023-07-22 RX ORDER — CYANOCOBALAMIN 1000 UG/ML
1000 INJECTION, SOLUTION INTRAMUSCULAR; SUBCUTANEOUS ONCE
Status: COMPLETED | OUTPATIENT
Start: 2023-07-22 | End: 2023-07-22

## 2023-07-22 RX ORDER — POTASSIUM CHLORIDE 29.8 MG/ML
40 INJECTION INTRAVENOUS EVERY 4 HOURS
Status: DISCONTINUED | OUTPATIENT
Start: 2023-07-22 | End: 2023-07-22

## 2023-07-22 RX ORDER — POTASSIUM CHLORIDE 7.45 MG/ML
10 INJECTION INTRAVENOUS
Status: COMPLETED | OUTPATIENT
Start: 2023-07-22 | End: 2023-07-22

## 2023-07-22 RX ADMIN — SUCRALFATE 1 G: 1 TABLET ORAL at 21:43

## 2023-07-22 RX ADMIN — WARFARIN SODIUM 2.5 MG: 5 TABLET ORAL at 17:20

## 2023-07-22 RX ADMIN — LEVETIRACETAM 1000 MG: 100 INJECTION INTRAVENOUS at 15:05

## 2023-07-22 RX ADMIN — THIAMINE HYDROCHLORIDE 500 MG: 100 INJECTION, SOLUTION INTRAMUSCULAR; INTRAVENOUS at 21:45

## 2023-07-22 RX ADMIN — Medication 10 ML: at 21:43

## 2023-07-22 RX ADMIN — ACETYLCYSTEINE 600 MG: 100 SOLUTION ORAL; RESPIRATORY (INHALATION) at 06:07

## 2023-07-22 RX ADMIN — METOPROLOL SUCCINATE 25 MG: 25 TABLET, EXTENDED RELEASE ORAL at 09:37

## 2023-07-22 RX ADMIN — ACETYLCYSTEINE 600 MG: 100 SOLUTION ORAL; RESPIRATORY (INHALATION) at 03:58

## 2023-07-22 RX ADMIN — IPRATROPIUM BROMIDE AND ALBUTEROL SULFATE 1 DOSE: .5; 2.5 SOLUTION RESPIRATORY (INHALATION) at 16:47

## 2023-07-22 RX ADMIN — THIAMINE HYDROCHLORIDE 500 MG: 100 INJECTION, SOLUTION INTRAMUSCULAR; INTRAVENOUS at 09:51

## 2023-07-22 RX ADMIN — SUCRALFATE 1 G: 1 TABLET ORAL at 12:24

## 2023-07-22 RX ADMIN — IPRATROPIUM BROMIDE AND ALBUTEROL SULFATE 1 DOSE: .5; 2.5 SOLUTION RESPIRATORY (INHALATION) at 20:43

## 2023-07-22 RX ADMIN — SODIUM CHLORIDE, PRESERVATIVE FREE 10 ML: 5 INJECTION INTRAVENOUS at 21:43

## 2023-07-22 RX ADMIN — IPRATROPIUM BROMIDE AND ALBUTEROL SULFATE 1 DOSE: .5; 2.5 SOLUTION RESPIRATORY (INHALATION) at 06:07

## 2023-07-22 RX ADMIN — POTASSIUM CHLORIDE 10 MEQ: 7.46 INJECTION, SOLUTION INTRAVENOUS at 12:42

## 2023-07-22 RX ADMIN — POTASSIUM CHLORIDE 10 MEQ: 7.46 INJECTION, SOLUTION INTRAVENOUS at 11:36

## 2023-07-22 RX ADMIN — ROSUVASTATIN 10 MG: 10 TABLET, FILM COATED ORAL at 09:36

## 2023-07-22 RX ADMIN — CYANOCOBALAMIN 1000 MCG: 1000 INJECTION, SOLUTION INTRAMUSCULAR; SUBCUTANEOUS at 12:03

## 2023-07-22 RX ADMIN — ASPIRIN 81 MG: 81 TABLET, COATED ORAL at 09:36

## 2023-07-22 RX ADMIN — Medication 15 ML: at 12:02

## 2023-07-22 RX ADMIN — TAMSULOSIN HYDROCHLORIDE 0.4 MG: 0.4 CAPSULE ORAL at 09:37

## 2023-07-22 RX ADMIN — ACETYLCYSTEINE 600 MG: 100 SOLUTION ORAL; RESPIRATORY (INHALATION) at 11:33

## 2023-07-22 RX ADMIN — LORAZEPAM 2 MG: 2 INJECTION INTRAMUSCULAR; INTRAVENOUS at 22:50

## 2023-07-22 RX ADMIN — SODIUM CHLORIDE, PRESERVATIVE FREE 40 MG: 5 INJECTION INTRAVENOUS at 09:36

## 2023-07-22 RX ADMIN — IPRATROPIUM BROMIDE AND ALBUTEROL SULFATE 1 DOSE: .5; 2.5 SOLUTION RESPIRATORY (INHALATION) at 03:58

## 2023-07-22 RX ADMIN — HEPARIN SODIUM 11 UNITS/KG/HR: 10000 INJECTION, SOLUTION INTRAVENOUS at 09:02

## 2023-07-22 RX ADMIN — POTASSIUM CHLORIDE 10 MEQ: 7.46 INJECTION, SOLUTION INTRAVENOUS at 10:27

## 2023-07-22 RX ADMIN — POTASSIUM CHLORIDE 10 MEQ: 7.46 INJECTION, SOLUTION INTRAVENOUS at 13:48

## 2023-07-22 RX ADMIN — THIAMINE HYDROCHLORIDE 500 MG: 100 INJECTION, SOLUTION INTRAMUSCULAR; INTRAVENOUS at 15:05

## 2023-07-22 RX ADMIN — SUCRALFATE 1 G: 1 TABLET ORAL at 17:21

## 2023-07-22 RX ADMIN — LORAZEPAM 2 MG: 2 INJECTION INTRAMUSCULAR; INTRAVENOUS at 21:48

## 2023-07-22 RX ADMIN — DEXTROSE MONOHYDRATE: 50 INJECTION, SOLUTION INTRAVENOUS at 08:16

## 2023-07-22 RX ADMIN — FOLIC ACID 1 MG: 1 TABLET ORAL at 09:36

## 2023-07-22 RX ADMIN — IPRATROPIUM BROMIDE AND ALBUTEROL SULFATE 1 DOSE: .5; 2.5 SOLUTION RESPIRATORY (INHALATION) at 11:33

## 2023-07-22 RX ADMIN — LEVETIRACETAM 1000 MG: 100 INJECTION INTRAVENOUS at 03:10

## 2023-07-22 ASSESSMENT — PAIN SCALES - GENERAL
PAINLEVEL_OUTOF10: 0

## 2023-07-23 ENCOUNTER — APPOINTMENT (OUTPATIENT)
Dept: GENERAL RADIOLOGY | Age: 58
End: 2023-07-23
Payer: COMMERCIAL

## 2023-07-23 LAB
ALBUMIN SERPL-MCNC: 4.1 G/DL (ref 3.5–5.2)
ALP SERPL-CCNC: 186 U/L (ref 40–129)
ALT SERPL-CCNC: 12 U/L (ref 0–40)
ANION GAP SERPL CALCULATED.3IONS-SCNC: 14 MMOL/L (ref 7–16)
AST SERPL-CCNC: 31 U/L (ref 0–39)
BASOPHILS # BLD: 0.07 K/UL (ref 0–0.2)
BASOPHILS NFR BLD: 1 % (ref 0–2)
BILIRUB SERPL-MCNC: 0.8 MG/DL (ref 0–1.2)
BNP SERPL-MCNC: 7299 PG/ML (ref 0–125)
BUN SERPL-MCNC: 14 MG/DL (ref 6–20)
CALCIUM SERPL-MCNC: 9.6 MG/DL (ref 8.6–10.2)
CHLORIDE SERPL-SCNC: 108 MMOL/L (ref 98–107)
CO2 SERPL-SCNC: 25 MMOL/L (ref 22–29)
CREAT SERPL-MCNC: 1.5 MG/DL (ref 0.7–1.2)
EOSINOPHIL # BLD: 0.29 K/UL (ref 0.05–0.5)
EOSINOPHILS RELATIVE PERCENT: 4 % (ref 0–6)
ERYTHROCYTE [DISTWIDTH] IN BLOOD BY AUTOMATED COUNT: 19.1 % (ref 11.5–15)
GFR SERPL CREATININE-BSD FRML MDRD: 53 ML/MIN/1.73M2
GLUCOSE SERPL-MCNC: 87 MG/DL (ref 74–99)
HCT VFR BLD AUTO: 29.6 % (ref 37–54)
HGB BLD-MCNC: 8.4 G/DL (ref 12.5–16.5)
IMM GRANULOCYTES # BLD AUTO: 0.05 K/UL (ref 0–0.58)
IMM GRANULOCYTES NFR BLD: 1 % (ref 0–5)
INR PPP: 1.7
LYMPHOCYTES NFR BLD: 0.93 K/UL (ref 1.5–4)
LYMPHOCYTES RELATIVE PERCENT: 14 % (ref 20–42)
MAGNESIUM SERPL-MCNC: 1.8 MG/DL (ref 1.6–2.6)
MCH RBC QN AUTO: 25.1 PG (ref 26–35)
MCHC RBC AUTO-ENTMCNC: 28.4 G/DL (ref 32–34.5)
MCV RBC AUTO: 88.6 FL (ref 80–99.9)
MONOCYTES NFR BLD: 0.94 K/UL (ref 0.1–0.95)
MONOCYTES NFR BLD: 14 % (ref 2–12)
NEUTROPHILS NFR BLD: 65 % (ref 43–80)
NEUTS SEG NFR BLD: 4.24 K/UL (ref 1.8–7.3)
PARTIAL THROMBOPLASTIN TIME: 43.8 SEC (ref 24.5–35.1)
PARTIAL THROMBOPLASTIN TIME: 49.8 SEC (ref 24.5–35.1)
PARTIAL THROMBOPLASTIN TIME: 52.5 SEC (ref 24.5–35.1)
PLATELET, FLUORESCENCE: 105 K/UL (ref 130–450)
PMV BLD AUTO: 12.7 FL (ref 7–12)
POTASSIUM SERPL-SCNC: 3.5 MMOL/L (ref 3.5–5)
PROT SERPL-MCNC: 6.9 G/DL (ref 6.4–8.3)
PROTHROMBIN TIME: 18.9 SEC (ref 9.3–12.4)
RBC # BLD AUTO: 3.34 M/UL (ref 3.8–5.8)
RBC # BLD: ABNORMAL 10*6/UL
SODIUM SERPL-SCNC: 147 MMOL/L (ref 132–146)
WBC OTHER # BLD: 6.5 K/UL (ref 4.5–11.5)

## 2023-07-23 PROCEDURE — 2580000003 HC RX 258: Performed by: INTERNAL MEDICINE

## 2023-07-23 PROCEDURE — 6360000002 HC RX W HCPCS: Performed by: INTERNAL MEDICINE

## 2023-07-23 PROCEDURE — 80053 COMPREHEN METABOLIC PANEL: CPT

## 2023-07-23 PROCEDURE — 6370000000 HC RX 637 (ALT 250 FOR IP): Performed by: INTERNAL MEDICINE

## 2023-07-23 PROCEDURE — C9113 INJ PANTOPRAZOLE SODIUM, VIA: HCPCS | Performed by: INTERNAL MEDICINE

## 2023-07-23 PROCEDURE — 36415 COLL VENOUS BLD VENIPUNCTURE: CPT

## 2023-07-23 PROCEDURE — 85027 COMPLETE CBC AUTOMATED: CPT

## 2023-07-23 PROCEDURE — 99231 SBSQ HOSP IP/OBS SF/LOW 25: CPT | Performed by: CLINICAL NURSE SPECIALIST

## 2023-07-23 PROCEDURE — 85730 THROMBOPLASTIN TIME PARTIAL: CPT

## 2023-07-23 PROCEDURE — 71045 X-RAY EXAM CHEST 1 VIEW: CPT

## 2023-07-23 PROCEDURE — 1200000000 HC SEMI PRIVATE

## 2023-07-23 PROCEDURE — 2580000003 HC RX 258

## 2023-07-23 PROCEDURE — 2700000000 HC OXYGEN THERAPY PER DAY

## 2023-07-23 PROCEDURE — 2500000003 HC RX 250 WO HCPCS: Performed by: INTERNAL MEDICINE

## 2023-07-23 PROCEDURE — 85610 PROTHROMBIN TIME: CPT

## 2023-07-23 PROCEDURE — 83880 ASSAY OF NATRIURETIC PEPTIDE: CPT

## 2023-07-23 PROCEDURE — 83735 ASSAY OF MAGNESIUM: CPT

## 2023-07-23 PROCEDURE — 6370000000 HC RX 637 (ALT 250 FOR IP)

## 2023-07-23 PROCEDURE — 94640 AIRWAY INHALATION TREATMENT: CPT

## 2023-07-23 PROCEDURE — 94660 CPAP INITIATION&MGMT: CPT

## 2023-07-23 RX ORDER — WARFARIN SODIUM 5 MG/1
5 TABLET ORAL DAILY
Status: DISCONTINUED | OUTPATIENT
Start: 2023-07-23 | End: 2023-07-25 | Stop reason: SDUPTHER

## 2023-07-23 RX ORDER — METOLAZONE 2.5 MG/1
2.5 TABLET ORAL ONCE
Status: COMPLETED | OUTPATIENT
Start: 2023-07-23 | End: 2023-07-23

## 2023-07-23 RX ORDER — DEXTROSE MONOHYDRATE 50 MG/ML
1000 INJECTION, SOLUTION INTRAVENOUS ONCE
Status: COMPLETED | OUTPATIENT
Start: 2023-07-23 | End: 2023-07-23

## 2023-07-23 RX ADMIN — IPRATROPIUM BROMIDE AND ALBUTEROL SULFATE 1 DOSE: .5; 2.5 SOLUTION RESPIRATORY (INHALATION) at 19:47

## 2023-07-23 RX ADMIN — THIAMINE HYDROCHLORIDE 500 MG: 100 INJECTION, SOLUTION INTRAMUSCULAR; INTRAVENOUS at 21:20

## 2023-07-23 RX ADMIN — FOLIC ACID 1 MG: 1 TABLET ORAL at 09:24

## 2023-07-23 RX ADMIN — SUCRALFATE 1 G: 1 TABLET ORAL at 11:53

## 2023-07-23 RX ADMIN — THIAMINE HYDROCHLORIDE 500 MG: 100 INJECTION, SOLUTION INTRAMUSCULAR; INTRAVENOUS at 18:01

## 2023-07-23 RX ADMIN — IPRATROPIUM BROMIDE AND ALBUTEROL SULFATE 1 DOSE: .5; 2.5 SOLUTION RESPIRATORY (INHALATION) at 15:46

## 2023-07-23 RX ADMIN — LORAZEPAM 2 MG: 1 TABLET ORAL at 21:12

## 2023-07-23 RX ADMIN — WARFARIN SODIUM 5 MG: 5 TABLET ORAL at 18:02

## 2023-07-23 RX ADMIN — SUCRALFATE 1 G: 1 TABLET ORAL at 18:02

## 2023-07-23 RX ADMIN — HEPARIN SODIUM 13.03 UNITS/KG/HR: 10000 INJECTION, SOLUTION INTRAVENOUS at 10:09

## 2023-07-23 RX ADMIN — IPRATROPIUM BROMIDE AND ALBUTEROL SULFATE 1 DOSE: .5; 2.5 SOLUTION RESPIRATORY (INHALATION) at 03:41

## 2023-07-23 RX ADMIN — METOLAZONE 2.5 MG: 2.5 TABLET ORAL at 14:00

## 2023-07-23 RX ADMIN — HEPARIN SODIUM 2000 UNITS: 1000 INJECTION INTRAVENOUS; SUBCUTANEOUS at 19:30

## 2023-07-23 RX ADMIN — ASPIRIN 81 MG: 81 TABLET, COATED ORAL at 09:24

## 2023-07-23 RX ADMIN — HEPARIN SODIUM 15 UNITS/KG/HR: 10000 INJECTION, SOLUTION INTRAVENOUS at 19:34

## 2023-07-23 RX ADMIN — DEXTROSE MONOHYDRATE 1000 ML: 50 INJECTION, SOLUTION INTRAVENOUS at 11:52

## 2023-07-23 RX ADMIN — IPRATROPIUM BROMIDE AND ALBUTEROL SULFATE 1 DOSE: .5; 2.5 SOLUTION RESPIRATORY (INHALATION) at 09:44

## 2023-07-23 RX ADMIN — ROSUVASTATIN 10 MG: 10 TABLET, FILM COATED ORAL at 09:23

## 2023-07-23 RX ADMIN — SODIUM CHLORIDE, PRESERVATIVE FREE 10 ML: 5 INJECTION INTRAVENOUS at 09:25

## 2023-07-23 RX ADMIN — METOPROLOL SUCCINATE 25 MG: 25 TABLET, EXTENDED RELEASE ORAL at 09:23

## 2023-07-23 RX ADMIN — TAMSULOSIN HYDROCHLORIDE 0.4 MG: 0.4 CAPSULE ORAL at 09:24

## 2023-07-23 RX ADMIN — LORAZEPAM 2 MG: 2 INJECTION INTRAMUSCULAR; INTRAVENOUS at 02:12

## 2023-07-23 RX ADMIN — LEVETIRACETAM 1000 MG: 100 INJECTION INTRAVENOUS at 03:25

## 2023-07-23 RX ADMIN — SODIUM CHLORIDE, PRESERVATIVE FREE 40 MG: 5 INJECTION INTRAVENOUS at 09:24

## 2023-07-23 RX ADMIN — SODIUM CHLORIDE, PRESERVATIVE FREE 10 ML: 5 INJECTION INTRAVENOUS at 21:21

## 2023-07-23 RX ADMIN — LEVETIRACETAM 1000 MG: 100 INJECTION INTRAVENOUS at 14:03

## 2023-07-23 RX ADMIN — METOPROLOL TARTRATE 5 MG: 1 INJECTION, SOLUTION INTRAVENOUS at 02:05

## 2023-07-23 RX ADMIN — SUCRALFATE 1 G: 1 TABLET ORAL at 21:12

## 2023-07-23 RX ADMIN — IPRATROPIUM BROMIDE AND ALBUTEROL SULFATE 1 DOSE: .5; 2.5 SOLUTION RESPIRATORY (INHALATION) at 00:18

## 2023-07-23 RX ADMIN — Medication 10 ML: at 09:25

## 2023-07-23 RX ADMIN — THIAMINE HYDROCHLORIDE 500 MG: 100 INJECTION, SOLUTION INTRAMUSCULAR; INTRAVENOUS at 09:02

## 2023-07-23 RX ADMIN — HEPARIN SODIUM 2000 UNITS: 1000 INJECTION INTRAVENOUS; SUBCUTANEOUS at 06:05

## 2023-07-23 RX ADMIN — Medication 15 ML: at 09:23

## 2023-07-23 ASSESSMENT — PAIN SCALES - GENERAL
PAINLEVEL_OUTOF10: 0

## 2023-07-23 NOTE — PROCEDURES
Date: 7/22/2023    Time: 2242    Patient Placed On BIPAP/CPAP/ Non-Invasive Ventilation? Yes    If no must comment. Facial area red/color change? No           If YES are Blister/Lesion present? No   If yes must notify nursing staff  BIPAP/CPAP skin barrier?   Yes    Skin barrier type:mepilexlite       Comments:        Lang Campbell RCP
EEG Report  Romy Baker is a 63 y.o. male      Appointment Date 7/17/2023   Appointment Time  10:45 am   Facility Location [facility] EEG Number [id number]   Type of Study Portable  Floor 1096     [id number]  [id number] . [address] of consciousness Sedation stopped    Sleep deprived? no   Hyperventilation tested? no   Photic stim tested? no   EEG recording Standard 10-20 electrode placement    Duration of recording 25 mins   EEG complete? yes       Clinical History   56-year-old male with past medical history of alcoholic liver disease, prosthetic aortic valve replacement on warfarin, history of mitral valve replacement, stroke, testicular cancer presented in the ER with concerns of alcohol withdrawal.  Patient states that he is going through alcohol withdrawal and had 2 seizures prior to arrival to ED. He drinks 4-5 beers every day. He was admitted in the floor for alcohol withdrawal.  In the floor he gradually became minimally responsive, only responding to sternal rub. He was transferred to ICU for altered mental status with alcohol withdrawal.    Medications    Current Facility-Administered Medications:      PHENobarbital (LUMINAL) injection 65 mg, 65 mg, IntraVENous, TID, Ramila Ferreira MD    levETIRAcetam (KEPPRA) injection 1,000 mg, 1,000 mg, IntraVENous, Q12H, Ramila Ferreira MD    pantoprazole (PROTONIX) 40 mg in sodium chloride (PF) 0.9 % 10 mL injection, 40 mg, IntraVENous, Q12H, Pamela Garcia MD, 40 mg at 07/17/23 0451    aspirin EC tablet 81 mg, 81 mg, Oral, Daily, Héctor Smolder, DO, 81 mg at 07/17/23 1059    DULoxetine (CYMBALTA) extended release capsule 30 mg, 30 mg, Oral, Daily, Héctor Smolder, DO, 30 mg at 52/41/20 6758    folic acid (FOLVITE) tablet 1 mg, 1 mg, Oral, Daily, Héctor Smolder, DO, 1 mg at 07/17/23 1059    haloperidol (HALDOL) tablet 5 mg, 5 mg, Oral, Nightly, Héctor Smolder, DO, 5 mg at 07/16/23 2006    [Held by provider] levETIRAcetam (KEPPRA) tablet 250 mg, 250 mg,
ENDOTRACHEAL INTUBATION PROCEDURE NOTE  7/15/23       Time: 2:53 PM    INTUBATION  Risks, benefits and alternatives if able (for applicable procedures below) described. Performed By: Jaime Torres MD     Indication:  Respiratory failure. Informed consent: Consent unable to be obtained due to the emergent nature of this procedure. .    Procedure: Following Preoxygenation the patient was pretreated with etomidate followed by succinylcholine. Intubation was performed after single attempt(s) by direct laryngoscopy using a laryngoscope and 8.0mm cuffed endotracheal tube was inserted . I  nitial post procedure placement:  confirmed by bilateral breath sounds and bilateral breath sounds, ETCO2 detection, and absence of sounds over stomach. Tube Secured @ 24cm at the Lip. Post procedure chest x-ray: has been ordered but is still pending. Procedural Complications: None.      Anesthesia Consult:  No.
[employer] Rep was contacted to verify pacer status per [employer] Rep, Pacer system is not MRI conditional due to pacer leads. Pt cannot have an MRI.      Pacemaker [id number]  Lead [id number]  Lead 3659-44
cords were visualized and examined. The scope was then passed into the trachea. 1% plain lidocaine 3 ml was used topically on the christina. Careful inspection of the tracheal lumen was accomplished. The scope was sequentially passed into the left main and then left upper and lower bronchi and segmental bronchi. Bronch Wash/Bal was done and there was yellow  of about 10 ml specimen was collected for lab. The scope was then withdrawn and advanced into the right main bronchus and then into the RUL, RML, and RLL bronchi and segmental bronchi. Bronch Wash/Bal was done and there was yellow  of about 10 ml specimen was collected for lab. .     Method:  ( x) The patient was already intubated and on mechanical ventilation and placed on 100% oxygen. ( ) Alternatively bronchoscope was passed thru through an endotracheal tube. (x ) The bronchoscope was then passed into the trachea via the ET tube. After careful inspection of the tracheal, the bronchoscope was sequentially passed into all segments of the left and right endobronchial trees to the second and/or third divisions. (x ) Lidocaine 2% solution 2 ml at a time was applied topically to the christina. Endobronchial findings  Bronchoscopy examination  LLL yellow secretion with lavage aspirated  Or  Vocal Cords : x  Trachea : No tracheal stenosis. Grossly trachea was normal and was Normal mucosa.   Christina  : was sharp and had Normal mucosa    Right Main Stem Bronchus :  Normal mucosa  Right Upper Lobe Bronchi  :  Normal mucosa  Right Middle Lobe Bronchi :  Normal mucosa  Right Lower Lobe Bronchi :  Normal mucosa   (including the Superior segment)      Left Main Stem Bronchus  : Edematous mucosa  Left Upper Lobe Bronchus : Upper Division Edematous mucosa  Left Upper Lobe Bronchus : Lingula  Edematous mucosa  Left Lower Lobe Bronchus  : (including the Superior segment)  Edematous mucosa and Specimen: BAL 10 ml yellow secretion to Micro send    Specimens Type taken :

## 2023-07-24 LAB
ALBUMIN SERPL-MCNC: 3.9 G/DL (ref 3.5–5.2)
ALP SERPL-CCNC: 177 U/L (ref 40–129)
ALT SERPL-CCNC: 13 U/L (ref 0–40)
ANION GAP SERPL CALCULATED.3IONS-SCNC: 10 MMOL/L (ref 7–16)
ANION GAP SERPL CALCULATED.3IONS-SCNC: 14 MMOL/L (ref 7–16)
AST SERPL-CCNC: 34 U/L (ref 0–39)
BILIRUB SERPL-MCNC: 0.8 MG/DL (ref 0–1.2)
BUN SERPL-MCNC: 15 MG/DL (ref 6–20)
BUN SERPL-MCNC: 16 MG/DL (ref 6–20)
CALCIUM SERPL-MCNC: 10.3 MG/DL (ref 8.6–10.2)
CALCIUM SERPL-MCNC: 9.6 MG/DL (ref 8.6–10.2)
CHLORIDE SERPL-SCNC: 109 MMOL/L (ref 98–107)
CHLORIDE SERPL-SCNC: 110 MMOL/L (ref 98–107)
CO2 SERPL-SCNC: 25 MMOL/L (ref 22–29)
CO2 SERPL-SCNC: 30 MMOL/L (ref 22–29)
CREAT SERPL-MCNC: 1.6 MG/DL (ref 0.7–1.2)
CREAT SERPL-MCNC: 1.7 MG/DL (ref 0.7–1.2)
CREAT UR-MCNC: 131 MG/DL (ref 40–278)
ERYTHROCYTE [DISTWIDTH] IN BLOOD BY AUTOMATED COUNT: 19.3 % (ref 11.5–15)
GFR SERPL CREATININE-BSD FRML MDRD: 48 ML/MIN/1.73M2
GFR SERPL CREATININE-BSD FRML MDRD: 51 ML/MIN/1.73M2
GLUCOSE SERPL-MCNC: 123 MG/DL (ref 74–99)
GLUCOSE SERPL-MCNC: 99 MG/DL (ref 74–99)
HCT VFR BLD AUTO: 29.1 % (ref 37–54)
HGB BLD-MCNC: 8.1 G/DL (ref 12.5–16.5)
INR PPP: 1.7
MAGNESIUM SERPL-MCNC: 1.8 MG/DL (ref 1.6–2.6)
MCH RBC QN AUTO: 25 PG (ref 26–35)
MCHC RBC AUTO-ENTMCNC: 27.8 G/DL (ref 32–34.5)
MCV RBC AUTO: 89.8 FL (ref 80–99.9)
PARTIAL THROMBOPLASTIN TIME: 57.5 SEC (ref 24.5–35.1)
PARTIAL THROMBOPLASTIN TIME: 59.3 SEC (ref 24.5–35.1)
PLATELET # BLD AUTO: 109 K/UL (ref 130–450)
PMV BLD AUTO: 12 FL (ref 7–12)
POTASSIUM SERPL-SCNC: 3.5 MMOL/L (ref 3.5–5)
POTASSIUM SERPL-SCNC: 3.5 MMOL/L (ref 3.5–5)
PROT SERPL-MCNC: 6.9 G/DL (ref 6.4–8.3)
PROTHROMBIN TIME: 18.7 SEC (ref 9.3–12.4)
RBC # BLD AUTO: 3.24 M/UL (ref 3.8–5.8)
SODIUM SERPL-SCNC: 149 MMOL/L (ref 132–146)
SODIUM SERPL-SCNC: 149 MMOL/L (ref 132–146)
TOTAL PROTEIN, URINE: 239 MG/DL (ref 0–12)
URINE TOTAL PROTEIN CREATININE RATIO: 1.83 (ref 0–0.2)
WBC OTHER # BLD: 5.6 K/UL (ref 4.5–11.5)

## 2023-07-24 PROCEDURE — 85027 COMPLETE CBC AUTOMATED: CPT

## 2023-07-24 PROCEDURE — 94660 CPAP INITIATION&MGMT: CPT

## 2023-07-24 PROCEDURE — 2580000003 HC RX 258: Performed by: INTERNAL MEDICINE

## 2023-07-24 PROCEDURE — 36415 COLL VENOUS BLD VENIPUNCTURE: CPT

## 2023-07-24 PROCEDURE — 80053 COMPREHEN METABOLIC PANEL: CPT

## 2023-07-24 PROCEDURE — 6360000002 HC RX W HCPCS: Performed by: INTERNAL MEDICINE

## 2023-07-24 PROCEDURE — 83735 ASSAY OF MAGNESIUM: CPT

## 2023-07-24 PROCEDURE — 2700000000 HC OXYGEN THERAPY PER DAY

## 2023-07-24 PROCEDURE — 97535 SELF CARE MNGMENT TRAINING: CPT

## 2023-07-24 PROCEDURE — 6370000000 HC RX 637 (ALT 250 FOR IP): Performed by: INTERNAL MEDICINE

## 2023-07-24 PROCEDURE — 82043 UR ALBUMIN QUANTITATIVE: CPT

## 2023-07-24 PROCEDURE — 85730 THROMBOPLASTIN TIME PARTIAL: CPT

## 2023-07-24 PROCEDURE — 85610 PROTHROMBIN TIME: CPT

## 2023-07-24 PROCEDURE — 97165 OT EVAL LOW COMPLEX 30 MIN: CPT

## 2023-07-24 PROCEDURE — 84156 ASSAY OF PROTEIN URINE: CPT

## 2023-07-24 PROCEDURE — 94640 AIRWAY INHALATION TREATMENT: CPT

## 2023-07-24 PROCEDURE — A4216 STERILE WATER/SALINE, 10 ML: HCPCS | Performed by: INTERNAL MEDICINE

## 2023-07-24 PROCEDURE — 94668 MNPJ CHEST WALL SBSQ: CPT

## 2023-07-24 PROCEDURE — 82570 ASSAY OF URINE CREATININE: CPT

## 2023-07-24 PROCEDURE — 97530 THERAPEUTIC ACTIVITIES: CPT

## 2023-07-24 PROCEDURE — 1200000000 HC SEMI PRIVATE

## 2023-07-24 PROCEDURE — 80048 BASIC METABOLIC PNL TOTAL CA: CPT

## 2023-07-24 PROCEDURE — 97161 PT EVAL LOW COMPLEX 20 MIN: CPT

## 2023-07-24 PROCEDURE — C9113 INJ PANTOPRAZOLE SODIUM, VIA: HCPCS | Performed by: INTERNAL MEDICINE

## 2023-07-24 RX ORDER — BUMETANIDE 1 MG/1
2 TABLET ORAL DAILY
Status: DISCONTINUED | OUTPATIENT
Start: 2023-07-24 | End: 2023-07-30 | Stop reason: HOSPADM

## 2023-07-24 RX ORDER — FAMOTIDINE 20 MG/1
20 TABLET, FILM COATED ORAL 2 TIMES DAILY
Status: DISCONTINUED | OUTPATIENT
Start: 2023-07-24 | End: 2023-07-30 | Stop reason: HOSPADM

## 2023-07-24 RX ORDER — WARFARIN SODIUM 2 MG/1
2 TABLET ORAL
Status: ON HOLD | COMMUNITY
End: 2023-07-30 | Stop reason: HOSPADM

## 2023-07-24 RX ORDER — WARFARIN SODIUM 2.5 MG/1
2.5 TABLET ORAL
Status: ON HOLD | COMMUNITY
End: 2023-07-30 | Stop reason: HOSPADM

## 2023-07-24 RX ORDER — DEXTROSE MONOHYDRATE 50 MG/ML
INJECTION, SOLUTION INTRAVENOUS CONTINUOUS
Status: DISCONTINUED | OUTPATIENT
Start: 2023-07-24 | End: 2023-07-27

## 2023-07-24 RX ADMIN — LEVETIRACETAM 1000 MG: 100 INJECTION INTRAVENOUS at 14:29

## 2023-07-24 RX ADMIN — HEPARIN SODIUM 15 UNITS/KG/HR: 10000 INJECTION, SOLUTION INTRAVENOUS at 06:38

## 2023-07-24 RX ADMIN — ASPIRIN 81 MG: 81 TABLET, COATED ORAL at 08:07

## 2023-07-24 RX ADMIN — Medication 10 ML: at 08:29

## 2023-07-24 RX ADMIN — SODIUM CHLORIDE, PRESERVATIVE FREE 10 ML: 5 INJECTION INTRAVENOUS at 19:03

## 2023-07-24 RX ADMIN — SUCRALFATE 1 G: 1 TABLET ORAL at 17:51

## 2023-07-24 RX ADMIN — IPRATROPIUM BROMIDE AND ALBUTEROL SULFATE 1 DOSE: .5; 2.5 SOLUTION RESPIRATORY (INHALATION) at 00:30

## 2023-07-24 RX ADMIN — TAMSULOSIN HYDROCHLORIDE 0.4 MG: 0.4 CAPSULE ORAL at 08:05

## 2023-07-24 RX ADMIN — SUCRALFATE 1 G: 1 TABLET ORAL at 05:43

## 2023-07-24 RX ADMIN — ROSUVASTATIN 10 MG: 10 TABLET, FILM COATED ORAL at 08:05

## 2023-07-24 RX ADMIN — SODIUM CHLORIDE, PRESERVATIVE FREE 10 ML: 5 INJECTION INTRAVENOUS at 08:28

## 2023-07-24 RX ADMIN — FAMOTIDINE 20 MG: 20 TABLET, FILM COATED ORAL at 19:03

## 2023-07-24 RX ADMIN — BUMETANIDE 2 MG: 1 TABLET ORAL at 11:40

## 2023-07-24 RX ADMIN — SODIUM CHLORIDE, PRESERVATIVE FREE 40 MG: 5 INJECTION INTRAVENOUS at 08:28

## 2023-07-24 RX ADMIN — FAMOTIDINE 20 MG: 20 TABLET, FILM COATED ORAL at 10:05

## 2023-07-24 RX ADMIN — Medication 15 ML: at 08:06

## 2023-07-24 RX ADMIN — IPRATROPIUM BROMIDE AND ALBUTEROL SULFATE 1 DOSE: .5; 2.5 SOLUTION RESPIRATORY (INHALATION) at 18:08

## 2023-07-24 RX ADMIN — LEVETIRACETAM 1000 MG: 100 INJECTION INTRAVENOUS at 02:02

## 2023-07-24 RX ADMIN — SUCRALFATE 1 G: 1 TABLET ORAL at 19:04

## 2023-07-24 RX ADMIN — WARFARIN SODIUM 5 MG: 5 TABLET ORAL at 17:51

## 2023-07-24 RX ADMIN — DEXTROSE MONOHYDRATE: 50 INJECTION, SOLUTION INTRAVENOUS at 11:46

## 2023-07-24 RX ADMIN — SUCRALFATE 1 G: 1 TABLET ORAL at 11:40

## 2023-07-24 RX ADMIN — IPRATROPIUM BROMIDE AND ALBUTEROL SULFATE 1 DOSE: .5; 2.5 SOLUTION RESPIRATORY (INHALATION) at 08:12

## 2023-07-24 RX ADMIN — FOLIC ACID 1 MG: 1 TABLET ORAL at 08:05

## 2023-07-24 RX ADMIN — IPRATROPIUM BROMIDE AND ALBUTEROL SULFATE 1 DOSE: .5; 2.5 SOLUTION RESPIRATORY (INHALATION) at 13:18

## 2023-07-24 RX ADMIN — IPRATROPIUM BROMIDE AND ALBUTEROL SULFATE 1 DOSE: .5; 2.5 SOLUTION RESPIRATORY (INHALATION) at 19:54

## 2023-07-24 RX ADMIN — Medication 10 ML: at 19:04

## 2023-07-24 RX ADMIN — METOPROLOL SUCCINATE 25 MG: 25 TABLET, EXTENDED RELEASE ORAL at 08:05

## 2023-07-24 RX ADMIN — IPRATROPIUM BROMIDE AND ALBUTEROL SULFATE 1 DOSE: .5; 2.5 SOLUTION RESPIRATORY (INHALATION) at 03:26

## 2023-07-24 RX ADMIN — ACETYLCYSTEINE 600 MG: 100 SOLUTION ORAL; RESPIRATORY (INHALATION) at 13:19

## 2023-07-24 ASSESSMENT — PAIN SCALES - GENERAL
PAINLEVEL_OUTOF10: 0
PAINLEVEL_OUTOF10: 0

## 2023-07-24 NOTE — CARE COORDINATION
Peer Recovery Support Note    Name: Daniel Dexter  Date: 7/24/2023    Chief Complaint   Patient presents with    Alcohol Problem     Patient states he is going through alcohol withdrawal and had 2 seizures and fall today . +hit head +blood thinners . Last drink this am       Peer Support met with patient. [] Support and education provided  [] Resources provided   [] Treatment referral:   [x] Other:   [] Patient declined peer recovery services     Referred By: Amanda George (MICHELE)    Notes: Patient is not appropriate for residential treatment at this point. Will follow up with Jean Ceron.      Signed: Jadiel Garcia, 7/24/2023

## 2023-07-24 NOTE — CARE COORDINATION
Care Coordination LOS 10 day, Transfer to 84 from MICU on 7/24/23. Remains on heparin gtt bridging, on coumadin. INR subtherapeutic at 1.7 today with goal of 2.5-3.5 per chart review in pt with Prosthetic aortic valve replacement and MVP replacement. On 3 ltr with no home 02. Spoke to [employer] from Peer Recovery and she will follow up with Raul Mackey as she was following for inpt rehab.     Electronically signed by Jean-Claude Barth RN on 7/24/2023 at 9:34 AM

## 2023-07-24 NOTE — CARE COORDINATION
Social Work/Case Management Transition of Care Planning (Daija Newsomey [facility] [phone]): Per report and chart review, patient remains on a Heparin drip with bridge to Coumadin. He is on 4L NC at 99%. He is on bipap at HS. Neurology is following. Patient is on IV Keppra q12. Nephrology is following for FRANTZ. PT/OT to eval. Met with patient at bedside with Jamie Hampton from [facility]. Patient did state he was willing to go to an inpatient treatment program but PT/OT evals will be needed to determine appropriate next step in his care. CM/SW will follow.   ANJEL Seymour  7/24/2023

## 2023-07-24 NOTE — FLOWSHEET NOTE
Inpatient Wound Care (Initial consult) 8418A    Admit Date: 7/14/2023  6:27 PM    Reason for consult:  Bridge of nose    Significant history:  Per H&P    Chief Complaint:  had concerns including Alcohol Problem (Patient states he is going through alcohol withdrawal and had 2 seizures and fall today . +hit head +blood thinners . Last drink this am). History Of Present Illness:    Mr. Monika Ponce, a 63y.o. year old male  who  has a past medical history of Alcoholic liver disease ([address]), H/O prosthetic aortic valve replacement, History of mitral valve replacement, Stroke [facility]), and Testicular cancer ([address]). Findings:     07/24/23 0932   Skin Integumentary    Skin Integrity Ecchymosis   Location BUE   Wound 07/23/23 Bridge of nose   Date First Assessed/Time First Assessed: 07/23/23 0300   Present on Hospital Admission: No  Wound Description (Comments): Bridge of nose   Wound Image    Wound Etiology Pressure Stage 2   Dressing/Treatment Open to air   Wound Length (cm) 1 cm   Wound Width (cm) 1.2 cm   Wound Depth (cm) 0.1 cm   Wound Surface Area (cm^2) 1.2 cm^2   Change in Wound Size % (l*w) 53.13   Wound Volume (cm^3) 0.12 cm^3   Wound Healing % 53   Wound Assessment Pink/red   Drainage Amount None   Odor None   Breanne-wound Assessment Dry/flaky       **Informed Consent**     photos taken of wound and inserted into their chart as part of their permanent medical record for purposes of documentation, treatment management and/or medical review. All Images taken on 7/24/23 of patient name: Monika Ponce were transmitted and stored on [employer] located within [employer] by a registered [employer] Mobile [employer] Device. Impression:  Bridge of nose: Stage 2    Plan: Leave open to air  Patients nurse to request a full face mask of bipap  Medix heel boots  TAPS  Patient will need continued preventative care.       Kalpesh Tejeda RN 7/24/2023 10:01 AM

## 2023-07-25 ENCOUNTER — APPOINTMENT (OUTPATIENT)
Dept: GENERAL RADIOLOGY | Age: 58
End: 2023-07-25
Payer: COMMERCIAL

## 2023-07-25 LAB
ALBUMIN SERPL-MCNC: 4.1 G/DL (ref 3.5–5.2)
ALP SERPL-CCNC: 195 U/L (ref 40–129)
ALT SERPL-CCNC: 18 U/L (ref 0–40)
ANION GAP SERPL CALCULATED.3IONS-SCNC: 11 MMOL/L (ref 7–16)
ANION GAP SERPL CALCULATED.3IONS-SCNC: 12 MMOL/L (ref 7–16)
AST SERPL-CCNC: 50 U/L (ref 0–39)
BILIRUB SERPL-MCNC: 1 MG/DL (ref 0–1.2)
BUN SERPL-MCNC: 13 MG/DL (ref 6–20)
BUN SERPL-MCNC: 15 MG/DL (ref 6–20)
CALCIUM SERPL-MCNC: 10 MG/DL (ref 8.6–10.2)
CALCIUM SERPL-MCNC: 9.7 MG/DL (ref 8.6–10.2)
CHLORIDE SERPL-SCNC: 101 MMOL/L (ref 98–107)
CHLORIDE SERPL-SCNC: 107 MMOL/L (ref 98–107)
CO2 SERPL-SCNC: 28 MMOL/L (ref 22–29)
CO2 SERPL-SCNC: 31 MMOL/L (ref 22–29)
CREAT SERPL-MCNC: 1.5 MG/DL (ref 0.7–1.2)
CREAT SERPL-MCNC: 1.5 MG/DL (ref 0.7–1.2)
CREAT UR-MCNC: 133.9 MG/DL (ref 40–278)
ERYTHROCYTE [DISTWIDTH] IN BLOOD BY AUTOMATED COUNT: 19.3 % (ref 11.5–15)
GFR SERPL CREATININE-BSD FRML MDRD: 52 ML/MIN/1.73M2
GFR SERPL CREATININE-BSD FRML MDRD: 54 ML/MIN/1.73M2
GLUCOSE SERPL-MCNC: 100 MG/DL (ref 74–99)
GLUCOSE SERPL-MCNC: 116 MG/DL (ref 74–99)
HCT VFR BLD AUTO: 30 % (ref 37–54)
HGB BLD-MCNC: 8.5 G/DL (ref 12.5–16.5)
INR PPP: 1.5
MAGNESIUM SERPL-MCNC: 1.6 MG/DL (ref 1.6–2.6)
MCH RBC QN AUTO: 25 PG (ref 26–35)
MCHC RBC AUTO-ENTMCNC: 28.3 G/DL (ref 32–34.5)
MCV RBC AUTO: 88.2 FL (ref 80–99.9)
MICROALBUMIN UR-MCNC: 1351 MG/L (ref 0–19)
MICROALBUMIN/CREAT UR-RTO: 1009 MCG/MG CREAT (ref 0–30)
PARTIAL THROMBOPLASTIN TIME: 63.8 SEC (ref 24.5–35.1)
PLATELET, FLUORESCENCE: 113 K/UL (ref 130–450)
PMV BLD AUTO: ABNORMAL FL (ref 7–12)
POTASSIUM SERPL-SCNC: 3 MMOL/L (ref 3.5–5)
POTASSIUM SERPL-SCNC: 3.3 MMOL/L (ref 3.5–5)
PROT SERPL-MCNC: 7.4 G/DL (ref 6.4–8.3)
PROTHROMBIN TIME: 16.9 SEC (ref 9.3–12.4)
RBC # BLD AUTO: 3.4 M/UL (ref 3.8–5.8)
SODIUM SERPL-SCNC: 143 MMOL/L (ref 132–146)
SODIUM SERPL-SCNC: 147 MMOL/L (ref 132–146)
WBC OTHER # BLD: 5.8 K/UL (ref 4.5–11.5)

## 2023-07-25 PROCEDURE — 71045 X-RAY EXAM CHEST 1 VIEW: CPT

## 2023-07-25 PROCEDURE — 6370000000 HC RX 637 (ALT 250 FOR IP): Performed by: INTERNAL MEDICINE

## 2023-07-25 PROCEDURE — 85610 PROTHROMBIN TIME: CPT

## 2023-07-25 PROCEDURE — 2700000000 HC OXYGEN THERAPY PER DAY

## 2023-07-25 PROCEDURE — 6360000002 HC RX W HCPCS: Performed by: INTERNAL MEDICINE

## 2023-07-25 PROCEDURE — 85027 COMPLETE CBC AUTOMATED: CPT

## 2023-07-25 PROCEDURE — 94640 AIRWAY INHALATION TREATMENT: CPT

## 2023-07-25 PROCEDURE — 85730 THROMBOPLASTIN TIME PARTIAL: CPT

## 2023-07-25 PROCEDURE — 92526 ORAL FUNCTION THERAPY: CPT

## 2023-07-25 PROCEDURE — 97530 THERAPEUTIC ACTIVITIES: CPT

## 2023-07-25 PROCEDURE — 80048 BASIC METABOLIC PNL TOTAL CA: CPT

## 2023-07-25 PROCEDURE — 83735 ASSAY OF MAGNESIUM: CPT

## 2023-07-25 PROCEDURE — 97535 SELF CARE MNGMENT TRAINING: CPT

## 2023-07-25 PROCEDURE — 2580000003 HC RX 258: Performed by: INTERNAL MEDICINE

## 2023-07-25 PROCEDURE — 80053 COMPREHEN METABOLIC PANEL: CPT

## 2023-07-25 PROCEDURE — 1200000000 HC SEMI PRIVATE

## 2023-07-25 PROCEDURE — 36415 COLL VENOUS BLD VENIPUNCTURE: CPT

## 2023-07-25 PROCEDURE — 94660 CPAP INITIATION&MGMT: CPT

## 2023-07-25 RX ORDER — WARFARIN SODIUM 7.5 MG/1
7.5 TABLET ORAL
Status: COMPLETED | OUTPATIENT
Start: 2023-07-25 | End: 2023-07-25

## 2023-07-25 RX ORDER — POTASSIUM CHLORIDE 7.45 MG/ML
10 INJECTION INTRAVENOUS ONCE
Status: COMPLETED | OUTPATIENT
Start: 2023-07-25 | End: 2023-07-25

## 2023-07-25 RX ORDER — MAGNESIUM SULFATE IN WATER 40 MG/ML
2000 INJECTION, SOLUTION INTRAVENOUS ONCE
Status: COMPLETED | OUTPATIENT
Start: 2023-07-25 | End: 2023-07-25

## 2023-07-25 RX ADMIN — SUCRALFATE 1 G: 1 TABLET ORAL at 09:53

## 2023-07-25 RX ADMIN — FAMOTIDINE 20 MG: 20 TABLET, FILM COATED ORAL at 21:16

## 2023-07-25 RX ADMIN — ROSUVASTATIN 10 MG: 10 TABLET, FILM COATED ORAL at 09:53

## 2023-07-25 RX ADMIN — IPRATROPIUM BROMIDE AND ALBUTEROL SULFATE 1 DOSE: .5; 2.5 SOLUTION RESPIRATORY (INHALATION) at 15:29

## 2023-07-25 RX ADMIN — POTASSIUM CHLORIDE 10 MEQ: 7.46 INJECTION, SOLUTION INTRAVENOUS at 11:30

## 2023-07-25 RX ADMIN — ASPIRIN 81 MG: 81 TABLET, COATED ORAL at 09:53

## 2023-07-25 RX ADMIN — LEVETIRACETAM 1000 MG: 100 INJECTION INTRAVENOUS at 02:05

## 2023-07-25 RX ADMIN — IPRATROPIUM BROMIDE AND ALBUTEROL SULFATE 1 DOSE: .5; 2.5 SOLUTION RESPIRATORY (INHALATION) at 18:34

## 2023-07-25 RX ADMIN — POTASSIUM CHLORIDE 10 MEQ: 7.46 INJECTION, SOLUTION INTRAVENOUS at 12:44

## 2023-07-25 RX ADMIN — MAGNESIUM SULFATE 2000 MG: 2 INJECTION INTRAVENOUS at 14:16

## 2023-07-25 RX ADMIN — SUCRALFATE 1 G: 1 TABLET ORAL at 05:00

## 2023-07-25 RX ADMIN — IPRATROPIUM BROMIDE AND ALBUTEROL SULFATE 1 DOSE: .5; 2.5 SOLUTION RESPIRATORY (INHALATION) at 07:49

## 2023-07-25 RX ADMIN — FAMOTIDINE 20 MG: 20 TABLET, FILM COATED ORAL at 09:53

## 2023-07-25 RX ADMIN — METOPROLOL SUCCINATE 25 MG: 25 TABLET, EXTENDED RELEASE ORAL at 09:53

## 2023-07-25 RX ADMIN — HEPARIN SODIUM 15 UNITS/KG/HR: 10000 INJECTION, SOLUTION INTRAVENOUS at 17:58

## 2023-07-25 RX ADMIN — IPRATROPIUM BROMIDE AND ALBUTEROL SULFATE 1 DOSE: .5; 2.5 SOLUTION RESPIRATORY (INHALATION) at 11:36

## 2023-07-25 RX ADMIN — HEPARIN SODIUM 15 UNITS/KG/HR: 10000 INJECTION, SOLUTION INTRAVENOUS at 00:03

## 2023-07-25 RX ADMIN — RIFAXIMIN 600 MG: 200 TABLET ORAL at 21:16

## 2023-07-25 RX ADMIN — SODIUM CHLORIDE, PRESERVATIVE FREE 10 ML: 5 INJECTION INTRAVENOUS at 09:53

## 2023-07-25 RX ADMIN — POTASSIUM CHLORIDE 10 MEQ: 7.46 INJECTION, SOLUTION INTRAVENOUS at 10:02

## 2023-07-25 RX ADMIN — LEVETIRACETAM 1000 MG: 100 INJECTION INTRAVENOUS at 14:47

## 2023-07-25 RX ADMIN — RIFAXIMIN 600 MG: 200 TABLET ORAL at 13:45

## 2023-07-25 RX ADMIN — WARFARIN SODIUM 7.5 MG: 7.5 TABLET ORAL at 13:46

## 2023-07-25 RX ADMIN — Medication 15 ML: at 09:53

## 2023-07-25 RX ADMIN — BUMETANIDE 2 MG: 1 TABLET ORAL at 09:53

## 2023-07-25 RX ADMIN — SUCRALFATE 1 G: 1 TABLET ORAL at 21:16

## 2023-07-25 RX ADMIN — MIRTAZAPINE 7.5 MG: 15 TABLET, FILM COATED ORAL at 21:16

## 2023-07-25 RX ADMIN — FOLIC ACID 1 MG: 1 TABLET ORAL at 09:53

## 2023-07-25 RX ADMIN — SUCRALFATE 1 G: 1 TABLET ORAL at 16:50

## 2023-07-25 RX ADMIN — TAMSULOSIN HYDROCHLORIDE 0.4 MG: 0.4 CAPSULE ORAL at 09:53

## 2023-07-25 ASSESSMENT — PAIN SCALES - GENERAL
PAINLEVEL_OUTOF10: 0
PAINLEVEL_OUTOF10: 0

## 2023-07-25 NOTE — CARE COORDINATION
Here for alcohol withdrawal and had 2 seizures and fall . +hit head +blood thinners. Transferred out of ICU here on 7/23. Continues on heparin drip with bridge to coumadin. (anticoagulation is for his prostatic mechanical valves mitral and aortic)-Currently on 4 l oxygen and bipap @ hs. Pt eval 9/24  OT 10 /24 Nephrology following for CKD. Met with patient who is alert to person only - call placed to per patient  \" wife \" Janey Prince and  left for call back. Spoke with Viky Puckett at [facility] regarding rehab and alcohol rehab- she will review. Await call back from Janey Prince to discuss discharge plan. Electronically signed by Rishabh Redmond RN on 7/25/2023 at 1:57 PM    Message from [address] [place] [address] -- once off [facility] can accept. Await call back from Janey Prince. Cm/sw to follow. Electronically signed by Rishabh Redmond RN on 7/25/2023 at 2:03 PM    Call back from Janey Prince and is ok with rehab @ [facility] referral and message to attending with her concerns with him having depression. Adama Sandy with [facility] [employer]. Electronically signed by Rishabh Redmond RN on 7/25/2023 at 2:59 PM

## 2023-07-26 LAB
ALBUMIN SERPL-MCNC: 4 G/DL (ref 3.5–5.2)
ALP SERPL-CCNC: 169 U/L (ref 40–129)
ALT SERPL-CCNC: 20 U/L (ref 0–40)
ANION GAP SERPL CALCULATED.3IONS-SCNC: 11 MMOL/L (ref 7–16)
ANION GAP SERPL CALCULATED.3IONS-SCNC: 12 MMOL/L (ref 7–16)
AST SERPL-CCNC: 51 U/L (ref 0–39)
BILIRUB SERPL-MCNC: 1 MG/DL (ref 0–1.2)
BUN SERPL-MCNC: 14 MG/DL (ref 6–20)
BUN SERPL-MCNC: 16 MG/DL (ref 6–20)
CALCIUM SERPL-MCNC: 9.6 MG/DL (ref 8.6–10.2)
CALCIUM SERPL-MCNC: 9.6 MG/DL (ref 8.6–10.2)
CHLORIDE SERPL-SCNC: 100 MMOL/L (ref 98–107)
CHLORIDE SERPL-SCNC: 101 MMOL/L (ref 98–107)
CO2 SERPL-SCNC: 30 MMOL/L (ref 22–29)
CO2 SERPL-SCNC: 31 MMOL/L (ref 22–29)
CREAT SERPL-MCNC: 1.4 MG/DL (ref 0.7–1.2)
CREAT SERPL-MCNC: 1.5 MG/DL (ref 0.7–1.2)
GFR SERPL CREATININE-BSD FRML MDRD: 54 ML/MIN/1.73M2
GFR SERPL CREATININE-BSD FRML MDRD: 60 ML/MIN/1.73M2
GLUCOSE SERPL-MCNC: 105 MG/DL (ref 74–99)
GLUCOSE SERPL-MCNC: 96 MG/DL (ref 74–99)
INR PPP: 1.7
MAGNESIUM SERPL-MCNC: 1.6 MG/DL (ref 1.6–2.6)
PARTIAL THROMBOPLASTIN TIME: 61.6 SEC (ref 24.5–35.1)
POTASSIUM SERPL-SCNC: 3 MMOL/L (ref 3.5–5)
POTASSIUM SERPL-SCNC: 3.4 MMOL/L (ref 3.5–5)
PROT SERPL-MCNC: 7.2 G/DL (ref 6.4–8.3)
PROTHROMBIN TIME: 17.9 SEC (ref 9.3–12.4)
SODIUM SERPL-SCNC: 142 MMOL/L (ref 132–146)
SODIUM SERPL-SCNC: 143 MMOL/L (ref 132–146)

## 2023-07-26 PROCEDURE — 97530 THERAPEUTIC ACTIVITIES: CPT

## 2023-07-26 PROCEDURE — 94660 CPAP INITIATION&MGMT: CPT

## 2023-07-26 PROCEDURE — 6360000002 HC RX W HCPCS

## 2023-07-26 PROCEDURE — 6370000000 HC RX 637 (ALT 250 FOR IP): Performed by: INTERNAL MEDICINE

## 2023-07-26 PROCEDURE — 6370000000 HC RX 637 (ALT 250 FOR IP): Performed by: HOSPITALIST

## 2023-07-26 PROCEDURE — 85730 THROMBOPLASTIN TIME PARTIAL: CPT

## 2023-07-26 PROCEDURE — 97535 SELF CARE MNGMENT TRAINING: CPT

## 2023-07-26 PROCEDURE — 36415 COLL VENOUS BLD VENIPUNCTURE: CPT

## 2023-07-26 PROCEDURE — 99254 IP/OBS CNSLTJ NEW/EST MOD 60: CPT | Performed by: NURSE PRACTITIONER

## 2023-07-26 PROCEDURE — 94640 AIRWAY INHALATION TREATMENT: CPT

## 2023-07-26 PROCEDURE — 2700000000 HC OXYGEN THERAPY PER DAY

## 2023-07-26 PROCEDURE — 1200000000 HC SEMI PRIVATE

## 2023-07-26 PROCEDURE — 6360000002 HC RX W HCPCS: Performed by: INTERNAL MEDICINE

## 2023-07-26 PROCEDURE — 85610 PROTHROMBIN TIME: CPT

## 2023-07-26 PROCEDURE — 80048 BASIC METABOLIC PNL TOTAL CA: CPT

## 2023-07-26 PROCEDURE — 92526 ORAL FUNCTION THERAPY: CPT

## 2023-07-26 PROCEDURE — 83735 ASSAY OF MAGNESIUM: CPT

## 2023-07-26 PROCEDURE — 80053 COMPREHEN METABOLIC PANEL: CPT

## 2023-07-26 RX ORDER — POTASSIUM CHLORIDE 20 MEQ/1
20 TABLET, EXTENDED RELEASE ORAL ONCE
Status: COMPLETED | OUTPATIENT
Start: 2023-07-26 | End: 2023-07-26

## 2023-07-26 RX ORDER — WARFARIN SODIUM 7.5 MG/1
7.5 TABLET ORAL
Status: COMPLETED | OUTPATIENT
Start: 2023-07-26 | End: 2023-07-26

## 2023-07-26 RX ORDER — POTASSIUM CHLORIDE 7.45 MG/ML
10 INJECTION INTRAVENOUS
Status: DISCONTINUED | OUTPATIENT
Start: 2023-07-26 | End: 2023-07-26

## 2023-07-26 RX ORDER — MAGNESIUM SULFATE IN WATER 40 MG/ML
2000 INJECTION, SOLUTION INTRAVENOUS ONCE
Status: COMPLETED | OUTPATIENT
Start: 2023-07-26 | End: 2023-07-26

## 2023-07-26 RX ORDER — LEVETIRACETAM 500 MG/1
1000 TABLET ORAL 2 TIMES DAILY
Status: DISCONTINUED | OUTPATIENT
Start: 2023-07-26 | End: 2023-07-30 | Stop reason: HOSPADM

## 2023-07-26 RX ADMIN — IPRATROPIUM BROMIDE AND ALBUTEROL SULFATE 1 DOSE: .5; 2.5 SOLUTION RESPIRATORY (INHALATION) at 01:26

## 2023-07-26 RX ADMIN — HEPARIN SODIUM 15 UNITS/KG/HR: 10000 INJECTION, SOLUTION INTRAVENOUS at 14:30

## 2023-07-26 RX ADMIN — BUMETANIDE 2 MG: 1 TABLET ORAL at 11:15

## 2023-07-26 RX ADMIN — ASPIRIN 81 MG: 81 TABLET, COATED ORAL at 09:51

## 2023-07-26 RX ADMIN — FAMOTIDINE 20 MG: 20 TABLET, FILM COATED ORAL at 09:51

## 2023-07-26 RX ADMIN — SUCRALFATE 1 G: 1 TABLET ORAL at 09:51

## 2023-07-26 RX ADMIN — SUCRALFATE 1 G: 1 TABLET ORAL at 16:33

## 2023-07-26 RX ADMIN — WARFARIN SODIUM 7.5 MG: 7.5 TABLET ORAL at 17:15

## 2023-07-26 RX ADMIN — MIRTAZAPINE 7.5 MG: 15 TABLET, FILM COATED ORAL at 20:26

## 2023-07-26 RX ADMIN — POTASSIUM CHLORIDE 20 MEQ: 1500 TABLET, EXTENDED RELEASE ORAL at 20:26

## 2023-07-26 RX ADMIN — IPRATROPIUM BROMIDE AND ALBUTEROL SULFATE 1 DOSE: .5; 2.5 SOLUTION RESPIRATORY (INHALATION) at 16:46

## 2023-07-26 RX ADMIN — METOPROLOL SUCCINATE 25 MG: 25 TABLET, EXTENDED RELEASE ORAL at 09:50

## 2023-07-26 RX ADMIN — RIFAXIMIN 600 MG: 200 TABLET ORAL at 09:51

## 2023-07-26 RX ADMIN — LEVETIRACETAM 1000 MG: 500 TABLET, FILM COATED ORAL at 09:51

## 2023-07-26 RX ADMIN — LEVETIRACETAM 1000 MG: 500 TABLET, FILM COATED ORAL at 20:25

## 2023-07-26 RX ADMIN — IPRATROPIUM BROMIDE AND ALBUTEROL SULFATE 1 DOSE: .5; 2.5 SOLUTION RESPIRATORY (INHALATION) at 19:34

## 2023-07-26 RX ADMIN — SUCRALFATE 1 G: 1 TABLET ORAL at 05:57

## 2023-07-26 RX ADMIN — Medication 15 ML: at 09:52

## 2023-07-26 RX ADMIN — SUCRALFATE 1 G: 1 TABLET ORAL at 20:26

## 2023-07-26 RX ADMIN — POTASSIUM BICARBONATE 40 MEQ: 782 TABLET, EFFERVESCENT ORAL at 09:51

## 2023-07-26 RX ADMIN — FOLIC ACID 1 MG: 1 TABLET ORAL at 09:52

## 2023-07-26 RX ADMIN — MAGNESIUM SULFATE HEPTAHYDRATE 2000 MG: 40 INJECTION, SOLUTION INTRAVENOUS at 10:05

## 2023-07-26 RX ADMIN — RIFAXIMIN 600 MG: 200 TABLET ORAL at 20:25

## 2023-07-26 RX ADMIN — ROSUVASTATIN 10 MG: 10 TABLET, FILM COATED ORAL at 09:51

## 2023-07-26 RX ADMIN — IPRATROPIUM BROMIDE AND ALBUTEROL SULFATE 1 DOSE: .5; 2.5 SOLUTION RESPIRATORY (INHALATION) at 08:43

## 2023-07-26 RX ADMIN — RIFAXIMIN 600 MG: 200 TABLET ORAL at 13:31

## 2023-07-26 RX ADMIN — TAMSULOSIN HYDROCHLORIDE 0.4 MG: 0.4 CAPSULE ORAL at 09:51

## 2023-07-26 RX ADMIN — FAMOTIDINE 20 MG: 20 TABLET, FILM COATED ORAL at 20:25

## 2023-07-26 ASSESSMENT — PAIN SCALES - GENERAL: PAINLEVEL_OUTOF10: 0

## 2023-07-26 NOTE — CARE COORDINATION
Social Work/Case Management Transition of Care Planning (Ilya Dent [facility] [phone]): Per report and chart review, patient remains on 4L NC at 97%. Wean oxygen as able. CPAP at HS. Patient remains on a heparin drip with bridge to coumadin. Nephrology is following for FRANTZ. PT/OT scores noted to be 9/11. Met with patient at bedside. Discharge plan is to [facility]. Spoke with Toña Arreola and requested pre-cert be started today. LOUIE/destination completed. 7000 started and will need completed when discharge order is entered. Discharge envelope with ambulance form is in the soft chart and will need completed day of discharge. CM/SW will follow.   ANJEL Palmer  7/26/2023    Update:  7000 completed per insurance request.  ANJEL Palmer  7/26/2023

## 2023-07-26 NOTE — DISCHARGE INSTR - COC
Continuity of Care Form    Patient Name: Swathi Taylor   :  1965  MRN:  [id number]    Admit date:  2023  Discharge date:  2023    Code Status Order: Full Code   Advance Directives:     Admitting Physician:  Juan Jose Hartman DO  PCP: JOE Oreilly    Discharging Nurse: Farshad Schneider, [facility] Unit/Room#: 4577/1472-T  Discharging Unit Phone Number: [phone]    Emergency Contact:   Extended Emergency Contact Information  Primary Emergency Contact: Luciano SCHWARZ  Address: [facility], [address] [place] [address] of [address] Phone: [phone]  Mobile Phone: [phone]  Relation: Domestic Partner  Preferred language: English   needed? No    Past Surgical History:  Past Surgical History:   Procedure Laterality Date    COLONOSCOPY N/A 2022    COLONOSCOPY POLYPECTOMY HOT BIOPSY performed by James Maynard MD at [facility]      PACEMAKER PLACEMENT  2020    UPPER GASTROINTESTINAL ENDOSCOPY N/A 2022    EGD DIAGNOSTIC ONLY performed by James Maynard MD at [address] [place] [address]       Immunization History: There is no immunization history on file for this patient. Active Problems:  Patient Active Problem List   Diagnosis Code    Chest pain R07.9    Closed fracture of nasal bones S02. 2XXA    Injury of face S09. 93XA    Retroperitoneal hematoma K66.1    Vitamin D deficiency E55.9    Acute chest pain R07.9    Acute cholecystitis K81.0    Gastrointestinal hemorrhage K92.2    Polyp of colon K63.5    Right upper quadrant abdominal pain R10.11    Diarrhea R19.7    Current moderate episode of major depressive disorder without prior episode (HCC) F32.1    Hypertension I10    S/P MVR (mitral valve replacement) Z95.2    S/P AVR (aortic valve replacement) Z95.2    Coronary artery disease involving native coronary artery of native heart without angina pectoris I25.10

## 2023-07-27 LAB
ALBUMIN SERPL-MCNC: 4.3 G/DL (ref 3.5–5.2)
ALP SERPL-CCNC: 180 U/L (ref 40–129)
ALT SERPL-CCNC: 20 U/L (ref 0–40)
ANION GAP SERPL CALCULATED.3IONS-SCNC: 14 MMOL/L (ref 7–16)
AST SERPL-CCNC: 49 U/L (ref 0–39)
BASOPHILS # BLD: 0.08 K/UL (ref 0–0.2)
BASOPHILS NFR BLD: 1 % (ref 0–2)
BILIRUB SERPL-MCNC: 0.9 MG/DL (ref 0–1.2)
BUN SERPL-MCNC: 15 MG/DL (ref 6–20)
CALCIUM SERPL-MCNC: 9.9 MG/DL (ref 8.6–10.2)
CHLORIDE SERPL-SCNC: 96 MMOL/L (ref 98–107)
CO2 SERPL-SCNC: 29 MMOL/L (ref 22–29)
CREAT SERPL-MCNC: 1.4 MG/DL (ref 0.7–1.2)
EOSINOPHIL # BLD: 0.37 K/UL (ref 0.05–0.5)
EOSINOPHILS RELATIVE PERCENT: 5 % (ref 0–6)
ERYTHROCYTE [DISTWIDTH] IN BLOOD BY AUTOMATED COUNT: 19.8 % (ref 11.5–15)
GFR SERPL CREATININE-BSD FRML MDRD: 57 ML/MIN/1.73M2
GLUCOSE SERPL-MCNC: 102 MG/DL (ref 74–99)
HCT VFR BLD AUTO: 31.8 % (ref 37–54)
HGB BLD-MCNC: 9.4 G/DL (ref 12.5–16.5)
IMM GRANULOCYTES # BLD AUTO: 0.06 K/UL (ref 0–0.58)
IMM GRANULOCYTES NFR BLD: 1 % (ref 0–5)
INR PPP: 1.8
LYMPHOCYTES NFR BLD: 1.18 K/UL (ref 1.5–4)
LYMPHOCYTES RELATIVE PERCENT: 17 % (ref 20–42)
MAGNESIUM SERPL-MCNC: 1.8 MG/DL (ref 1.6–2.6)
MCH RBC QN AUTO: 25.3 PG (ref 26–35)
MCHC RBC AUTO-ENTMCNC: 29.6 G/DL (ref 32–34.5)
MCV RBC AUTO: 85.5 FL (ref 80–99.9)
MONOCYTES NFR BLD: 0.78 K/UL (ref 0.1–0.95)
MONOCYTES NFR BLD: 11 % (ref 2–12)
NEUTROPHILS NFR BLD: 65 % (ref 43–80)
NEUTS SEG NFR BLD: 4.54 K/UL (ref 1.8–7.3)
PARTIAL THROMBOPLASTIN TIME: 63.5 SEC (ref 24.5–35.1)
PLATELET, FLUORESCENCE: 159 K/UL (ref 130–450)
PMV BLD AUTO: 12.9 FL (ref 7–12)
POTASSIUM SERPL-SCNC: 3 MMOL/L (ref 3.5–5)
PROT SERPL-MCNC: 8 G/DL (ref 6.4–8.3)
PROTHROMBIN TIME: 19.8 SEC (ref 9.3–12.4)
RBC # BLD AUTO: 3.72 M/UL (ref 3.8–5.8)
SODIUM SERPL-SCNC: 139 MMOL/L (ref 132–146)
WBC OTHER # BLD: 7 K/UL (ref 4.5–11.5)

## 2023-07-27 PROCEDURE — 2580000003 HC RX 258: Performed by: INTERNAL MEDICINE

## 2023-07-27 PROCEDURE — 97530 THERAPEUTIC ACTIVITIES: CPT

## 2023-07-27 PROCEDURE — 6370000000 HC RX 637 (ALT 250 FOR IP): Performed by: HOSPITALIST

## 2023-07-27 PROCEDURE — 97535 SELF CARE MNGMENT TRAINING: CPT

## 2023-07-27 PROCEDURE — 6370000000 HC RX 637 (ALT 250 FOR IP): Performed by: INTERNAL MEDICINE

## 2023-07-27 PROCEDURE — 85610 PROTHROMBIN TIME: CPT

## 2023-07-27 PROCEDURE — 36415 COLL VENOUS BLD VENIPUNCTURE: CPT

## 2023-07-27 PROCEDURE — 83735 ASSAY OF MAGNESIUM: CPT

## 2023-07-27 PROCEDURE — 94660 CPAP INITIATION&MGMT: CPT

## 2023-07-27 PROCEDURE — 85027 COMPLETE CBC AUTOMATED: CPT

## 2023-07-27 PROCEDURE — 80053 COMPREHEN METABOLIC PANEL: CPT

## 2023-07-27 PROCEDURE — 1200000000 HC SEMI PRIVATE

## 2023-07-27 PROCEDURE — 6360000002 HC RX W HCPCS: Performed by: INTERNAL MEDICINE

## 2023-07-27 PROCEDURE — 85730 THROMBOPLASTIN TIME PARTIAL: CPT

## 2023-07-27 PROCEDURE — 94640 AIRWAY INHALATION TREATMENT: CPT

## 2023-07-27 PROCEDURE — 92610 EVALUATE SWALLOWING FUNCTION: CPT | Performed by: SPEECH-LANGUAGE PATHOLOGIST

## 2023-07-27 RX ORDER — POTASSIUM CHLORIDE 20 MEQ/1
20 TABLET, EXTENDED RELEASE ORAL
Status: DISCONTINUED | OUTPATIENT
Start: 2023-07-28 | End: 2023-07-28

## 2023-07-27 RX ORDER — LANOLIN ALCOHOL/MO/W.PET/CERES
400 CREAM (GRAM) TOPICAL DAILY
Status: DISCONTINUED | OUTPATIENT
Start: 2023-07-27 | End: 2023-07-30 | Stop reason: HOSPADM

## 2023-07-27 RX ORDER — POTASSIUM CHLORIDE 7.45 MG/ML
10 INJECTION INTRAVENOUS PRN
Status: DISCONTINUED | OUTPATIENT
Start: 2023-07-27 | End: 2023-07-30 | Stop reason: HOSPADM

## 2023-07-27 RX ORDER — POTASSIUM CHLORIDE 20 MEQ/1
40 TABLET, EXTENDED RELEASE ORAL PRN
Status: DISCONTINUED | OUTPATIENT
Start: 2023-07-27 | End: 2023-07-30 | Stop reason: HOSPADM

## 2023-07-27 RX ORDER — MAGNESIUM SULFATE IN WATER 40 MG/ML
2000 INJECTION, SOLUTION INTRAVENOUS PRN
Status: DISCONTINUED | OUTPATIENT
Start: 2023-07-27 | End: 2023-07-30 | Stop reason: HOSPADM

## 2023-07-27 RX ORDER — WARFARIN SODIUM 10 MG/1
10 TABLET ORAL
Status: COMPLETED | OUTPATIENT
Start: 2023-07-27 | End: 2023-07-27

## 2023-07-27 RX ORDER — POTASSIUM CHLORIDE 20 MEQ/1
40 TABLET, EXTENDED RELEASE ORAL EVERY 4 HOURS
Status: COMPLETED | OUTPATIENT
Start: 2023-07-27 | End: 2023-07-27

## 2023-07-27 RX ADMIN — POTASSIUM CHLORIDE 40 MEQ: 1500 TABLET, EXTENDED RELEASE ORAL at 14:33

## 2023-07-27 RX ADMIN — IPRATROPIUM BROMIDE AND ALBUTEROL SULFATE 1 DOSE: .5; 2.5 SOLUTION RESPIRATORY (INHALATION) at 14:01

## 2023-07-27 RX ADMIN — SUCRALFATE 1 G: 1 TABLET ORAL at 10:41

## 2023-07-27 RX ADMIN — BUMETANIDE 2 MG: 1 TABLET ORAL at 09:03

## 2023-07-27 RX ADMIN — IPRATROPIUM BROMIDE AND ALBUTEROL SULFATE 1 DOSE: .5; 2.5 SOLUTION RESPIRATORY (INHALATION) at 20:14

## 2023-07-27 RX ADMIN — POTASSIUM CHLORIDE 40 MEQ: 1500 TABLET, EXTENDED RELEASE ORAL at 18:41

## 2023-07-27 RX ADMIN — RIFAXIMIN 600 MG: 200 TABLET ORAL at 13:27

## 2023-07-27 RX ADMIN — TAMSULOSIN HYDROCHLORIDE 0.4 MG: 0.4 CAPSULE ORAL at 09:02

## 2023-07-27 RX ADMIN — LEVETIRACETAM 1000 MG: 500 TABLET, FILM COATED ORAL at 21:06

## 2023-07-27 RX ADMIN — SUCRALFATE 1 G: 1 TABLET ORAL at 17:14

## 2023-07-27 RX ADMIN — Medication 400 MG: at 17:14

## 2023-07-27 RX ADMIN — Medication 10 ML: at 09:04

## 2023-07-27 RX ADMIN — HEPARIN SODIUM 15 UNITS/KG/HR: 10000 INJECTION, SOLUTION INTRAVENOUS at 07:22

## 2023-07-27 RX ADMIN — SUCRALFATE 1 G: 1 TABLET ORAL at 06:08

## 2023-07-27 RX ADMIN — ASPIRIN 81 MG: 81 TABLET, COATED ORAL at 09:03

## 2023-07-27 RX ADMIN — RIFAXIMIN 600 MG: 200 TABLET ORAL at 09:02

## 2023-07-27 RX ADMIN — ROSUVASTATIN 10 MG: 10 TABLET, FILM COATED ORAL at 09:03

## 2023-07-27 RX ADMIN — METOPROLOL SUCCINATE 25 MG: 25 TABLET, EXTENDED RELEASE ORAL at 09:03

## 2023-07-27 RX ADMIN — IPRATROPIUM BROMIDE AND ALBUTEROL SULFATE 1 DOSE: .5; 2.5 SOLUTION RESPIRATORY (INHALATION) at 09:51

## 2023-07-27 RX ADMIN — IPRATROPIUM BROMIDE AND ALBUTEROL SULFATE 1 DOSE: .5; 2.5 SOLUTION RESPIRATORY (INHALATION) at 16:39

## 2023-07-27 RX ADMIN — SUCRALFATE 1 G: 1 TABLET ORAL at 21:06

## 2023-07-27 RX ADMIN — WARFARIN SODIUM 10 MG: 10 TABLET ORAL at 14:29

## 2023-07-27 RX ADMIN — FOLIC ACID 1 MG: 1 TABLET ORAL at 09:02

## 2023-07-27 RX ADMIN — IPRATROPIUM BROMIDE AND ALBUTEROL SULFATE 1 DOSE: .5; 2.5 SOLUTION RESPIRATORY (INHALATION) at 05:48

## 2023-07-27 RX ADMIN — RIFAXIMIN 600 MG: 200 TABLET ORAL at 21:06

## 2023-07-27 RX ADMIN — SODIUM CHLORIDE, PRESERVATIVE FREE 10 ML: 5 INJECTION INTRAVENOUS at 09:03

## 2023-07-27 RX ADMIN — FAMOTIDINE 20 MG: 20 TABLET, FILM COATED ORAL at 09:03

## 2023-07-27 RX ADMIN — LEVETIRACETAM 1000 MG: 500 TABLET, FILM COATED ORAL at 09:02

## 2023-07-27 RX ADMIN — MIRTAZAPINE 7.5 MG: 15 TABLET, FILM COATED ORAL at 21:06

## 2023-07-27 RX ADMIN — FAMOTIDINE 20 MG: 20 TABLET, FILM COATED ORAL at 21:06

## 2023-07-27 ASSESSMENT — PAIN SCALES - GENERAL: PAINLEVEL_OUTOF10: 0

## 2023-07-27 NOTE — CARE COORDINATION
Social Work/Case Management Transition of Care Planning Dipesh Kimball [phone]): Per report and chart review, patient is now on room air at 95%. CPAP at HS. Psych was consulted due to patient and family concern regarding depression. They recommend to continue mirtazapine. Updated PT/OT noted to be 14/15  Patient ambulated 30' with no assistive device and min assist.   Discharge plan is to [facility]. Pre-cert was started on 5/85. LOUIE/destination completed. 7000 started and will need completed when discharge order is entered. Discharge envelope with ambulance form is in the soft chart and will need completed day of discharge. CM/SW will follow.   ANJEL Mckeon  7/27/2023

## 2023-07-28 ENCOUNTER — APPOINTMENT (OUTPATIENT)
Dept: GENERAL RADIOLOGY | Age: 58
End: 2023-07-28
Payer: COMMERCIAL

## 2023-07-28 LAB
ALBUMIN SERPL-MCNC: 3.9 G/DL (ref 3.5–5.2)
ALP SERPL-CCNC: 150 U/L (ref 40–129)
ALT SERPL-CCNC: 18 U/L (ref 0–40)
ANION GAP SERPL CALCULATED.3IONS-SCNC: 12 MMOL/L (ref 7–16)
AST SERPL-CCNC: 45 U/L (ref 0–39)
BILIRUB SERPL-MCNC: 0.8 MG/DL (ref 0–1.2)
BUN SERPL-MCNC: 13 MG/DL (ref 6–20)
CALCIUM SERPL-MCNC: 9.6 MG/DL (ref 8.6–10.2)
CHLORIDE SERPL-SCNC: 99 MMOL/L (ref 98–107)
CO2 SERPL-SCNC: 28 MMOL/L (ref 22–29)
CREAT SERPL-MCNC: 1.5 MG/DL (ref 0.7–1.2)
GFR SERPL CREATININE-BSD FRML MDRD: 53 ML/MIN/1.73M2
GLUCOSE SERPL-MCNC: 94 MG/DL (ref 74–99)
INR PPP: 2.2
MAGNESIUM SERPL-MCNC: 1.8 MG/DL (ref 1.6–2.6)
PARTIAL THROMBOPLASTIN TIME: 75.5 SEC (ref 24.5–35.1)
POTASSIUM SERPL-SCNC: 3.2 MMOL/L (ref 3.5–5)
PROT SERPL-MCNC: 7.2 G/DL (ref 6.4–8.3)
PROTHROMBIN TIME: 23.8 SEC (ref 9.3–12.4)
SODIUM SERPL-SCNC: 139 MMOL/L (ref 132–146)

## 2023-07-28 PROCEDURE — 6370000000 HC RX 637 (ALT 250 FOR IP): Performed by: INTERNAL MEDICINE

## 2023-07-28 PROCEDURE — 6370000000 HC RX 637 (ALT 250 FOR IP)

## 2023-07-28 PROCEDURE — 92526 ORAL FUNCTION THERAPY: CPT | Performed by: SPEECH-LANGUAGE PATHOLOGIST

## 2023-07-28 PROCEDURE — 80053 COMPREHEN METABOLIC PANEL: CPT

## 2023-07-28 PROCEDURE — 85610 PROTHROMBIN TIME: CPT

## 2023-07-28 PROCEDURE — 94640 AIRWAY INHALATION TREATMENT: CPT

## 2023-07-28 PROCEDURE — 83735 ASSAY OF MAGNESIUM: CPT

## 2023-07-28 PROCEDURE — 6370000000 HC RX 637 (ALT 250 FOR IP): Performed by: HOSPITALIST

## 2023-07-28 PROCEDURE — 74230 X-RAY XM SWLNG FUNCJ C+: CPT

## 2023-07-28 PROCEDURE — 92611 MOTION FLUOROSCOPY/SWALLOW: CPT | Performed by: SPEECH-LANGUAGE PATHOLOGIST

## 2023-07-28 PROCEDURE — 1200000000 HC SEMI PRIVATE

## 2023-07-28 PROCEDURE — 6360000002 HC RX W HCPCS: Performed by: INTERNAL MEDICINE

## 2023-07-28 PROCEDURE — 2580000003 HC RX 258: Performed by: INTERNAL MEDICINE

## 2023-07-28 PROCEDURE — 85730 THROMBOPLASTIN TIME PARTIAL: CPT

## 2023-07-28 PROCEDURE — 36415 COLL VENOUS BLD VENIPUNCTURE: CPT

## 2023-07-28 PROCEDURE — 94660 CPAP INITIATION&MGMT: CPT

## 2023-07-28 RX ORDER — WARFARIN SODIUM 7.5 MG/1
7.5 TABLET ORAL
Status: DISCONTINUED | OUTPATIENT
Start: 2023-07-28 | End: 2023-07-28

## 2023-07-28 RX ORDER — WARFARIN SODIUM 7.5 MG/1
7.5 TABLET ORAL
Status: COMPLETED | OUTPATIENT
Start: 2023-07-28 | End: 2023-07-28

## 2023-07-28 RX ORDER — POTASSIUM CHLORIDE 20 MEQ/1
40 TABLET, EXTENDED RELEASE ORAL
Status: DISCONTINUED | OUTPATIENT
Start: 2023-07-28 | End: 2023-07-30 | Stop reason: HOSPADM

## 2023-07-28 RX ADMIN — WARFARIN SODIUM 7.5 MG: 7.5 TABLET ORAL at 17:29

## 2023-07-28 RX ADMIN — IPRATROPIUM BROMIDE AND ALBUTEROL SULFATE 1 DOSE: .5; 2.5 SOLUTION RESPIRATORY (INHALATION) at 16:09

## 2023-07-28 RX ADMIN — RIFAXIMIN 600 MG: 200 TABLET ORAL at 09:13

## 2023-07-28 RX ADMIN — LEVETIRACETAM 1000 MG: 500 TABLET, FILM COATED ORAL at 09:11

## 2023-07-28 RX ADMIN — IPRATROPIUM BROMIDE AND ALBUTEROL SULFATE 1 DOSE: .5; 2.5 SOLUTION RESPIRATORY (INHALATION) at 12:35

## 2023-07-28 RX ADMIN — POTASSIUM CHLORIDE 20 MEQ: 1500 TABLET, EXTENDED RELEASE ORAL at 09:12

## 2023-07-28 RX ADMIN — TAMSULOSIN HYDROCHLORIDE 0.4 MG: 0.4 CAPSULE ORAL at 09:11

## 2023-07-28 RX ADMIN — IPRATROPIUM BROMIDE AND ALBUTEROL SULFATE 1 DOSE: .5; 2.5 SOLUTION RESPIRATORY (INHALATION) at 05:41

## 2023-07-28 RX ADMIN — METOPROLOL SUCCINATE 25 MG: 25 TABLET, EXTENDED RELEASE ORAL at 09:11

## 2023-07-28 RX ADMIN — ROSUVASTATIN 10 MG: 10 TABLET, FILM COATED ORAL at 09:11

## 2023-07-28 RX ADMIN — SODIUM CHLORIDE, PRESERVATIVE FREE 10 ML: 5 INJECTION INTRAVENOUS at 20:53

## 2023-07-28 RX ADMIN — Medication 10 ML: at 20:53

## 2023-07-28 RX ADMIN — RIFAXIMIN 600 MG: 200 TABLET ORAL at 20:53

## 2023-07-28 RX ADMIN — Medication 15 ML: at 09:22

## 2023-07-28 RX ADMIN — MIRTAZAPINE 7.5 MG: 15 TABLET, FILM COATED ORAL at 20:52

## 2023-07-28 RX ADMIN — Medication 10 ML: at 09:14

## 2023-07-28 RX ADMIN — FAMOTIDINE 20 MG: 20 TABLET, FILM COATED ORAL at 09:11

## 2023-07-28 RX ADMIN — SUCRALFATE 1 G: 1 TABLET ORAL at 17:29

## 2023-07-28 RX ADMIN — SUCRALFATE 1 G: 1 TABLET ORAL at 09:11

## 2023-07-28 RX ADMIN — SODIUM CHLORIDE, PRESERVATIVE FREE 10 ML: 5 INJECTION INTRAVENOUS at 09:13

## 2023-07-28 RX ADMIN — Medication 400 MG: at 09:11

## 2023-07-28 RX ADMIN — LEVETIRACETAM 1000 MG: 500 TABLET, FILM COATED ORAL at 20:52

## 2023-07-28 RX ADMIN — ASPIRIN 81 MG: 81 TABLET, COATED ORAL at 09:19

## 2023-07-28 RX ADMIN — SUCRALFATE 1 G: 1 TABLET ORAL at 06:26

## 2023-07-28 RX ADMIN — FAMOTIDINE 20 MG: 20 TABLET, FILM COATED ORAL at 20:52

## 2023-07-28 RX ADMIN — IPRATROPIUM BROMIDE AND ALBUTEROL SULFATE 1 DOSE: .5; 2.5 SOLUTION RESPIRATORY (INHALATION) at 19:17

## 2023-07-28 RX ADMIN — HEPARIN SODIUM 14.97 UNITS/KG/HR: 10000 INJECTION, SOLUTION INTRAVENOUS at 00:40

## 2023-07-28 RX ADMIN — FOLIC ACID 1 MG: 1 TABLET ORAL at 09:11

## 2023-07-28 RX ADMIN — SUCRALFATE 1 G: 1 TABLET ORAL at 20:52

## 2023-07-28 RX ADMIN — RIFAXIMIN 600 MG: 200 TABLET ORAL at 14:11

## 2023-07-28 RX ADMIN — BUMETANIDE 2 MG: 1 TABLET ORAL at 09:11

## 2023-07-28 RX ADMIN — POTASSIUM CHLORIDE 40 MEQ: 1500 TABLET, EXTENDED RELEASE ORAL at 09:11

## 2023-07-28 ASSESSMENT — PAIN SCALES - GENERAL
PAINLEVEL_OUTOF10: 0
PAINLEVEL_OUTOF10: 0

## 2023-07-28 NOTE — CARE COORDINATION
Social Work/Case Management Transition of Care Planning (Fabiana Landin [place] [phone]): Per report and chart review, MBS was ordered. New ST recommendations are easy to chew solids with thin liquids. Patient remains on a heparin drip with bridge to coumadin. Nephrology is following and reports renal function is stable. Discharge plan is to [facility]. Pre-cert was started on 7/98. Requested Ritu Mckeon, [facility] assistant, check on status of pre-cert. LOUIE/destination completed. 7000 started and will need completed when discharge order is entered. Discharge envelope with ambulance form is in the soft chart and will need completed day of discharge. CM/SW will follow.  ANJEL Hale  7/28/2023

## 2023-07-29 LAB
ALBUMIN SERPL-MCNC: 4.2 G/DL (ref 3.5–5.2)
ALP SERPL-CCNC: 158 U/L (ref 40–129)
ALT SERPL-CCNC: 17 U/L (ref 0–40)
ANION GAP SERPL CALCULATED.3IONS-SCNC: 15 MMOL/L (ref 7–16)
AST SERPL-CCNC: 42 U/L (ref 0–39)
BILIRUB SERPL-MCNC: 0.8 MG/DL (ref 0–1.2)
BUN SERPL-MCNC: 14 MG/DL (ref 6–20)
CALCIUM SERPL-MCNC: 9.9 MG/DL (ref 8.6–10.2)
CHLORIDE SERPL-SCNC: 102 MMOL/L (ref 98–107)
CO2 SERPL-SCNC: 24 MMOL/L (ref 22–29)
CREAT SERPL-MCNC: 1.6 MG/DL (ref 0.7–1.2)
GFR SERPL CREATININE-BSD FRML MDRD: 50 ML/MIN/1.73M2
GLUCOSE SERPL-MCNC: 96 MG/DL (ref 74–99)
INR PPP: 2.3
MAGNESIUM SERPL-MCNC: 1.8 MG/DL (ref 1.6–2.6)
POTASSIUM SERPL-SCNC: 3.3 MMOL/L (ref 3.5–5)
PROT SERPL-MCNC: 7.6 G/DL (ref 6.4–8.3)
PROTHROMBIN TIME: 24.9 SEC (ref 9.3–12.4)
SODIUM SERPL-SCNC: 141 MMOL/L (ref 132–146)

## 2023-07-29 PROCEDURE — 2580000003 HC RX 258: Performed by: INTERNAL MEDICINE

## 2023-07-29 PROCEDURE — 6370000000 HC RX 637 (ALT 250 FOR IP): Performed by: HOSPITALIST

## 2023-07-29 PROCEDURE — 94660 CPAP INITIATION&MGMT: CPT

## 2023-07-29 PROCEDURE — 83735 ASSAY OF MAGNESIUM: CPT

## 2023-07-29 PROCEDURE — 6370000000 HC RX 637 (ALT 250 FOR IP): Performed by: INTERNAL MEDICINE

## 2023-07-29 PROCEDURE — 80053 COMPREHEN METABOLIC PANEL: CPT

## 2023-07-29 PROCEDURE — 36415 COLL VENOUS BLD VENIPUNCTURE: CPT

## 2023-07-29 PROCEDURE — 85610 PROTHROMBIN TIME: CPT

## 2023-07-29 PROCEDURE — 6370000000 HC RX 637 (ALT 250 FOR IP)

## 2023-07-29 PROCEDURE — 94640 AIRWAY INHALATION TREATMENT: CPT

## 2023-07-29 PROCEDURE — 1200000000 HC SEMI PRIVATE

## 2023-07-29 RX ORDER — WARFARIN SODIUM 7.5 MG/1
7.5 TABLET ORAL
Status: COMPLETED | OUTPATIENT
Start: 2023-07-29 | End: 2023-07-29

## 2023-07-29 RX ADMIN — ROSUVASTATIN 10 MG: 10 TABLET, FILM COATED ORAL at 08:39

## 2023-07-29 RX ADMIN — RIFAXIMIN 600 MG: 200 TABLET ORAL at 21:11

## 2023-07-29 RX ADMIN — RIFAXIMIN 600 MG: 200 TABLET ORAL at 08:39

## 2023-07-29 RX ADMIN — Medication 15 ML: at 08:40

## 2023-07-29 RX ADMIN — LEVETIRACETAM 1000 MG: 500 TABLET, FILM COATED ORAL at 08:39

## 2023-07-29 RX ADMIN — METOPROLOL SUCCINATE 25 MG: 25 TABLET, EXTENDED RELEASE ORAL at 08:40

## 2023-07-29 RX ADMIN — SUCRALFATE 1 G: 1 TABLET ORAL at 21:12

## 2023-07-29 RX ADMIN — SUCRALFATE 1 G: 1 TABLET ORAL at 17:57

## 2023-07-29 RX ADMIN — Medication 10 ML: at 08:40

## 2023-07-29 RX ADMIN — LEVETIRACETAM 1000 MG: 500 TABLET, FILM COATED ORAL at 21:12

## 2023-07-29 RX ADMIN — IPRATROPIUM BROMIDE AND ALBUTEROL SULFATE 1 DOSE: .5; 2.5 SOLUTION RESPIRATORY (INHALATION) at 11:27

## 2023-07-29 RX ADMIN — SODIUM CHLORIDE, PRESERVATIVE FREE 10 ML: 5 INJECTION INTRAVENOUS at 21:13

## 2023-07-29 RX ADMIN — SUCRALFATE 1 G: 1 TABLET ORAL at 06:03

## 2023-07-29 RX ADMIN — BUMETANIDE 2 MG: 1 TABLET ORAL at 08:40

## 2023-07-29 RX ADMIN — ASPIRIN 81 MG: 81 TABLET, COATED ORAL at 08:39

## 2023-07-29 RX ADMIN — Medication 400 MG: at 08:39

## 2023-07-29 RX ADMIN — WARFARIN SODIUM 7.5 MG: 7.5 TABLET ORAL at 17:58

## 2023-07-29 RX ADMIN — FAMOTIDINE 20 MG: 20 TABLET, FILM COATED ORAL at 21:12

## 2023-07-29 RX ADMIN — FOLIC ACID 1 MG: 1 TABLET ORAL at 08:40

## 2023-07-29 RX ADMIN — TAMSULOSIN HYDROCHLORIDE 0.4 MG: 0.4 CAPSULE ORAL at 08:40

## 2023-07-29 RX ADMIN — IPRATROPIUM BROMIDE AND ALBUTEROL SULFATE 1 DOSE: .5; 2.5 SOLUTION RESPIRATORY (INHALATION) at 02:43

## 2023-07-29 RX ADMIN — IPRATROPIUM BROMIDE AND ALBUTEROL SULFATE 1 DOSE: .5; 2.5 SOLUTION RESPIRATORY (INHALATION) at 16:20

## 2023-07-29 RX ADMIN — RIFAXIMIN 600 MG: 200 TABLET ORAL at 14:58

## 2023-07-29 RX ADMIN — FAMOTIDINE 20 MG: 20 TABLET, FILM COATED ORAL at 08:40

## 2023-07-29 RX ADMIN — POTASSIUM CHLORIDE 40 MEQ: 1500 TABLET, EXTENDED RELEASE ORAL at 08:39

## 2023-07-29 RX ADMIN — SODIUM CHLORIDE, PRESERVATIVE FREE 10 ML: 5 INJECTION INTRAVENOUS at 08:40

## 2023-07-29 RX ADMIN — MIRTAZAPINE 7.5 MG: 15 TABLET, FILM COATED ORAL at 21:13

## 2023-07-29 RX ADMIN — IPRATROPIUM BROMIDE AND ALBUTEROL SULFATE 1 DOSE: .5; 2.5 SOLUTION RESPIRATORY (INHALATION) at 20:21

## 2023-07-29 RX ADMIN — Medication 10 ML: at 21:13

## 2023-07-29 RX ADMIN — SUCRALFATE 1 G: 1 TABLET ORAL at 11:47

## 2023-07-29 RX ADMIN — IPRATROPIUM BROMIDE AND ALBUTEROL SULFATE 1 DOSE: .5; 2.5 SOLUTION RESPIRATORY (INHALATION) at 05:03

## 2023-07-29 NOTE — CARE COORDINATION
Received call from [address], they received precert, pt can discharge over weekend if ready. Per LSW note, envelope and ambulance form in soft chart. 7000 needs completed once discharge is ordered. Herbert Lofton, DEZ, LSW

## 2023-07-30 ENCOUNTER — APPOINTMENT (OUTPATIENT)
Dept: GENERAL RADIOLOGY | Age: 58
End: 2023-07-30
Payer: COMMERCIAL

## 2023-07-30 VITALS
DIASTOLIC BLOOD PRESSURE: 84 MMHG | WEIGHT: 258.2 LBS | OXYGEN SATURATION: 100 % | HEIGHT: 70 IN | TEMPERATURE: 97 F | HEART RATE: 78 BPM | BODY MASS INDEX: 36.97 KG/M2 | RESPIRATION RATE: 19 BRPM | SYSTOLIC BLOOD PRESSURE: 135 MMHG

## 2023-07-30 LAB
ALBUMIN SERPL-MCNC: 4.2 G/DL (ref 3.5–5.2)
ALP SERPL-CCNC: 165 U/L (ref 40–129)
ALT SERPL-CCNC: 22 U/L (ref 0–40)
ANION GAP SERPL CALCULATED.3IONS-SCNC: 13 MMOL/L (ref 7–16)
AST SERPL-CCNC: 55 U/L (ref 0–39)
BILIRUB SERPL-MCNC: 0.7 MG/DL (ref 0–1.2)
BUN SERPL-MCNC: 19 MG/DL (ref 6–20)
CALCIUM SERPL-MCNC: 10.1 MG/DL (ref 8.6–10.2)
CHLORIDE SERPL-SCNC: 99 MMOL/L (ref 98–107)
CO2 SERPL-SCNC: 26 MMOL/L (ref 22–29)
CREAT SERPL-MCNC: 1.9 MG/DL (ref 0.7–1.2)
GFR SERPL CREATININE-BSD FRML MDRD: 40 ML/MIN/1.73M2
GLUCOSE SERPL-MCNC: 98 MG/DL (ref 74–99)
INR PPP: 2.6
POTASSIUM SERPL-SCNC: 3.6 MMOL/L (ref 3.5–5)
PROT SERPL-MCNC: 7.9 G/DL (ref 6.4–8.3)
PROTHROMBIN TIME: 28.4 SEC (ref 9.3–12.4)
SODIUM SERPL-SCNC: 138 MMOL/L (ref 132–146)

## 2023-07-30 PROCEDURE — 85610 PROTHROMBIN TIME: CPT

## 2023-07-30 PROCEDURE — 94660 CPAP INITIATION&MGMT: CPT

## 2023-07-30 PROCEDURE — 51798 US URINE CAPACITY MEASURE: CPT

## 2023-07-30 PROCEDURE — 2580000003 HC RX 258: Performed by: INTERNAL MEDICINE

## 2023-07-30 PROCEDURE — 36415 COLL VENOUS BLD VENIPUNCTURE: CPT

## 2023-07-30 PROCEDURE — 6370000000 HC RX 637 (ALT 250 FOR IP): Performed by: INTERNAL MEDICINE

## 2023-07-30 PROCEDURE — 94640 AIRWAY INHALATION TREATMENT: CPT

## 2023-07-30 PROCEDURE — 6370000000 HC RX 637 (ALT 250 FOR IP)

## 2023-07-30 PROCEDURE — 6370000000 HC RX 637 (ALT 250 FOR IP): Performed by: HOSPITALIST

## 2023-07-30 PROCEDURE — 71045 X-RAY EXAM CHEST 1 VIEW: CPT

## 2023-07-30 PROCEDURE — 80053 COMPREHEN METABOLIC PANEL: CPT

## 2023-07-30 RX ORDER — LEVETIRACETAM 1000 MG/1
1000 TABLET ORAL 2 TIMES DAILY
Qty: 60 TABLET | Refills: 3 | Status: SHIPPED | OUTPATIENT
Start: 2023-07-30

## 2023-07-30 RX ORDER — POLYETHYLENE GLYCOL 3350 17 G/17G
17 POWDER, FOR SOLUTION ORAL DAILY PRN
Qty: 527 G | Refills: 1 | Status: SHIPPED | OUTPATIENT
Start: 2023-07-30 | End: 2023-09-30

## 2023-07-30 RX ORDER — FAMOTIDINE 20 MG/1
20 TABLET, FILM COATED ORAL 2 TIMES DAILY
Qty: 60 TABLET | Refills: 3 | Status: SHIPPED | OUTPATIENT
Start: 2023-07-30

## 2023-07-30 RX ORDER — IPRATROPIUM BROMIDE AND ALBUTEROL SULFATE 2.5; .5 MG/3ML; MG/3ML
3 SOLUTION RESPIRATORY (INHALATION)
Qty: 360 ML | Refills: 0 | Status: SHIPPED | OUTPATIENT
Start: 2023-07-30

## 2023-07-30 RX ORDER — WARFARIN SODIUM 4 MG/1
4 TABLET ORAL
Status: DISCONTINUED | OUTPATIENT
Start: 2023-07-30 | End: 2023-07-30 | Stop reason: HOSPADM

## 2023-07-30 RX ORDER — LANOLIN ALCOHOL/MO/W.PET/CERES
400 CREAM (GRAM) TOPICAL DAILY
Qty: 30 TABLET | Refills: 0 | Status: SHIPPED | OUTPATIENT
Start: 2023-07-31

## 2023-07-30 RX ORDER — WARFARIN SODIUM 4 MG/1
4 TABLET ORAL DAILY
Qty: 30 TABLET | Refills: 1 | Status: SHIPPED | OUTPATIENT
Start: 2023-07-30

## 2023-07-30 RX ADMIN — POTASSIUM CHLORIDE 40 MEQ: 1500 TABLET, EXTENDED RELEASE ORAL at 08:11

## 2023-07-30 RX ADMIN — ASPIRIN 81 MG: 81 TABLET, COATED ORAL at 08:12

## 2023-07-30 RX ADMIN — ROSUVASTATIN 10 MG: 10 TABLET, FILM COATED ORAL at 08:11

## 2023-07-30 RX ADMIN — FAMOTIDINE 20 MG: 20 TABLET, FILM COATED ORAL at 08:12

## 2023-07-30 RX ADMIN — IPRATROPIUM BROMIDE AND ALBUTEROL SULFATE 1 DOSE: .5; 2.5 SOLUTION RESPIRATORY (INHALATION) at 08:05

## 2023-07-30 RX ADMIN — SUCRALFATE 1 G: 1 TABLET ORAL at 05:47

## 2023-07-30 RX ADMIN — METOPROLOL SUCCINATE 25 MG: 25 TABLET, EXTENDED RELEASE ORAL at 08:12

## 2023-07-30 RX ADMIN — BUMETANIDE 2 MG: 1 TABLET ORAL at 08:11

## 2023-07-30 RX ADMIN — IPRATROPIUM BROMIDE AND ALBUTEROL SULFATE 1 DOSE: .5; 2.5 SOLUTION RESPIRATORY (INHALATION) at 15:42

## 2023-07-30 RX ADMIN — RIFAXIMIN 600 MG: 200 TABLET ORAL at 08:13

## 2023-07-30 RX ADMIN — RIFAXIMIN 600 MG: 200 TABLET ORAL at 14:57

## 2023-07-30 RX ADMIN — Medication 10 ML: at 08:13

## 2023-07-30 RX ADMIN — IPRATROPIUM BROMIDE AND ALBUTEROL SULFATE 1 DOSE: .5; 2.5 SOLUTION RESPIRATORY (INHALATION) at 06:05

## 2023-07-30 RX ADMIN — FOLIC ACID 1 MG: 1 TABLET ORAL at 08:12

## 2023-07-30 RX ADMIN — SUCRALFATE 1 G: 1 TABLET ORAL at 11:10

## 2023-07-30 RX ADMIN — Medication 15 ML: at 08:14

## 2023-07-30 RX ADMIN — Medication 400 MG: at 08:11

## 2023-07-30 RX ADMIN — IPRATROPIUM BROMIDE AND ALBUTEROL SULFATE 1 DOSE: .5; 2.5 SOLUTION RESPIRATORY (INHALATION) at 11:23

## 2023-07-30 RX ADMIN — LEVETIRACETAM 1000 MG: 500 TABLET, FILM COATED ORAL at 08:12

## 2023-07-30 RX ADMIN — SODIUM CHLORIDE, PRESERVATIVE FREE 10 ML: 5 INJECTION INTRAVENOUS at 08:12

## 2023-07-30 RX ADMIN — TAMSULOSIN HYDROCHLORIDE 0.4 MG: 0.4 CAPSULE ORAL at 08:12

## 2023-07-30 ASSESSMENT — PAIN SCALES - GENERAL
PAINLEVEL_OUTOF10: 0
PAINLEVEL_OUTOF10: 0

## 2023-07-30 NOTE — PLAN OF CARE
Problem: Discharge Planning  Goal: Discharge to home or other facility with appropriate resources  7/16/2023 0025 by Geena Baird RN  Outcome: Progressing  Flowsheets (Taken 7/16/2023 0000)  Discharge to home or other facility with appropriate resources: Identify barriers to discharge with patient and caregiver  7/15/2023 2109 by Genea Baird RN  Outcome: Progressing  Flowsheets  Taken 7/15/2023 2000 by Geena Baird RN  Discharge to home or other facility with appropriate resources: Identify barriers to discharge with patient and caregiver  Taken 7/15/2023 1400 by Sonido Bermudez RN  Discharge to home or other facility with appropriate resources: Identify barriers to discharge with patient and caregiver     Problem: Pain  Goal: Verbalizes/displays adequate comfort level or baseline comfort level  7/16/2023 0025 by Geena Baird RN  Outcome: Progressing  Flowsheets (Taken 7/16/2023 0000)  Verbalizes/displays adequate comfort level or baseline comfort level: Encourage patient to monitor pain and request assistance  7/15/2023 2109 by Geena Baird RN  Outcome: Progressing     Problem: Safety - Adult  Goal: Free from fall injury  7/16/2023 0025 by Geena Baird RN  Outcome: Progressing  Flowsheets (Taken 7/16/2023 0000)  Free From Fall Injury: Instruct family/caregiver on patient safety  7/15/2023 2109 by Geena Baird RN  Outcome: Progressing  Flowsheets (Taken 7/15/2023 2000)  Free From Fall Injury: Instruct family/caregiver on patient safety     Problem: Chronic Conditions and Co-morbidities  Goal: Patient's chronic conditions and co-morbidity symptoms are monitored and maintained or improved  7/16/2023 0025 by Geena Baird RN  Outcome: Progressing  Flowsheets (Taken 7/16/2023 0000)  Care Plan - Patient's Chronic Conditions and Co-Morbidity Symptoms are Monitored and Maintained or Improved: Monitor and assess patient's chronic conditions and comorbid symptoms for stability,
Problem: Discharge Planning  Goal: Discharge to home or other facility with appropriate resources  7/16/2023 1639 by Aline Carlson RN  Outcome: Not Progressing  7/16/2023 0920 by Aline Carlson RN  Outcome: Not Progressing     Problem: Pain  Goal: Verbalizes/displays adequate comfort level or baseline comfort level  7/16/2023 1639 by Aline Carlson RN  Outcome: Progressing  7/16/2023 0920 by Aline Carlson RN  Outcome: Progressing  Flowsheets (Taken 7/16/2023 0200 by Willi Sanchez RN)  Verbalizes/displays adequate comfort level or baseline comfort level: Encourage patient to monitor pain and request assistance     Problem: Safety - Adult  Goal: Free from fall injury  7/16/2023 1639 by Aline Carlson RN  Outcome: Progressing  7/16/2023 0920 by Aline Carlson RN  Outcome: Progressing     Problem: Chronic Conditions and Co-morbidities  Goal: Patient's chronic conditions and co-morbidity symptoms are monitored and maintained or improved  7/16/2023 1639 by Aline Carlson RN  Outcome: Progressing  7/16/2023 0920 by Aline Carlson RN  Outcome: Progressing     Problem: Safety - Medical Restraint  Goal: Remains free of injury from restraints (Restraint for Interference with Medical Device)  Description: INTERVENTIONS:  1. Determine that other, less restrictive measures have been tried or would not be effective before applying the restraint  2. Evaluate the patient's condition at the time of restraint application  3. Inform patient/family regarding the reason for restraint  4. Q2H: Monitor safety, psychosocial status, comfort, nutrition and hydration  7/16/2023 1639 by Aline Carlson RN  Outcome: Progressing  7/16/2023 0920 by Aline Carlson RN  Outcome: Progressing     Problem: Skin/Tissue Integrity  Goal: Absence of new skin breakdown  Description: 1. Monitor for areas of redness and/or skin breakdown  2. Assess vascular access sites hourly  3.   Every 4-6 hours minimum:  Change oxygen saturation probe
Problem: Discharge Planning  Goal: Discharge to home or other facility with appropriate resources  7/17/2023 1359 by Enoc Obando RN  Outcome: Progressing  Flowsheets (Taken 7/17/2023 0800)  Discharge to home or other facility with appropriate resources:   Identify barriers to discharge with patient and caregiver   Arrange for needed discharge resources and transportation as appropriate   Identify discharge learning needs (meds, wound care, etc)   Arrange for interpreters to assist at discharge as needed   Refer to discharge planning if patient needs post-hospital services based on physician order or complex needs related to functional status, cognitive ability or social support system    Problem: Pain  Goal: Verbalizes/displays adequate comfort level or baseline comfort level  7/17/2023 1359 by Enoc Obando RN  Outcome: Progressing     Problem: Safety - Adult  Goal: Free from fall injury  7/17/2023 1359 by Enoc Obando RN  Outcome: Progressing     Problem: Chronic Conditions and Co-morbidities  Goal: Patient's chronic conditions and co-morbidity symptoms are monitored and maintained or improved  7/17/2023 1359 by Enoc Obando RN  Outcome: Progressing  Flowsheets (Taken 7/17/2023 0800)  Care Plan - Patient's Chronic Conditions and Co-Morbidity Symptoms are Monitored and Maintained or Improved:   Monitor and assess patient's chronic conditions and comorbid symptoms for stability, deterioration, or improvement   Update acute care plan with appropriate goals if chronic or comorbid symptoms are exacerbated and prevent overall improvement and discharge   Collaborate with multidisciplinary team to address chronic and comorbid conditions and prevent exacerbation or deterioration     Problem: Safety - Medical Restraint  Goal: Remains free of injury from restraints (Restraint for Interference with Medical Device)  Description: INTERVENTIONS:  1.  Determine that other, less restrictive measures
Problem: Discharge Planning  Goal: Discharge to home or other facility with appropriate resources  7/17/2023 2128 by Fili Green RN  Outcome: Progressing     Problem: Pain  Goal: Verbalizes/displays adequate comfort level or baseline comfort level  7/17/2023 2128 by Fili Green RN  Outcome: Progressing     Problem: Safety - Adult  Goal: Free from fall injury  7/17/2023 2128 by Fili Green RN  Outcome: Progressing     Problem: Safety - Medical Restraint  Goal: Remains free of injury from restraints (Restraint for Interference with Medical Device)  Description: INTERVENTIONS:  1. Determine that other, less restrictive measures have been tried or would not be effective before applying the restraint  2. Evaluate the patient's condition at the time of restraint application  3. Inform patient/family regarding the reason for restraint  4. Q2H: Monitor safety, psychosocial status, comfort, nutrition and hydration  7/17/2023 2128 by Fili Green RN  Outcome: Progressing     Problem: Skin/Tissue Integrity  Goal: Absence of new skin breakdown  Description: 1. Monitor for areas of redness and/or skin breakdown  2. Assess vascular access sites hourly  3. Every 4-6 hours minimum:  Change oxygen saturation probe site  4. Every 4-6 hours:  If on nasal continuous positive airway pressure, respiratory therapy assess nares and determine need for appliance change or resting period.   7/17/2023 2128 by Fili Green RN  Outcome: Progressing     Problem: ABCDS Injury Assessment  Goal: Absence of physical injury  7/17/2023 2128 by Fili Green RN  Outcome: Progressing     Problem: Respiratory - Adult  Goal: Achieves optimal ventilation and oxygenation  7/17/2023 2128 by Fili Green RN  Outcome: Progressing     Problem: Gastrointestinal - Adult  Goal: Maintains adequate nutritional intake  7/17/2023 1813 by Junaid Franks RN  Outcome: Not Progressing  7/17/2023 1359 by Junaid Franks RN  Outcome: Not
Problem: Discharge Planning  Goal: Discharge to home or other facility with appropriate resources  7/18/2023 1814 by Nupur Shaikh RN  Outcome: Progressing     Problem: Pain  Goal: Verbalizes/displays adequate comfort level or baseline comfort level  7/18/2023 1814 by Nupur Shaikh RN  Outcome: Progressing  Flowsheets (Taken 7/18/2023 1600)  Verbalizes/displays adequate comfort level or baseline comfort level: Encourage patient to monitor pain and request assistance     Problem: Safety - Adult  Goal: Free from fall injury  7/18/2023 1814 by Nupur Shaikh RN  Outcome: Progressing  Flowsheets (Taken 7/18/2023 1600)  Free From Fall Injury: Instruct family/caregiver on patient safety     Problem: Chronic Conditions and Co-morbidities  Goal: Patient's chronic conditions and co-morbidity symptoms are monitored and maintained or improved  7/18/2023 1814 by Nupur Shaikh RN  Outcome: Progressing     Problem: Safety - Medical Restraint  Goal: Remains free of injury from restraints (Restraint for Interference with Medical Device)  Description: INTERVENTIONS:  1. Determine that other, less restrictive measures have been tried or would not be effective before applying the restraint  2. Evaluate the patient's condition at the time of restraint application  3. Inform patient/family regarding the reason for restraint  4. Q2H: Monitor safety, psychosocial status, comfort, nutrition and hydration  7/18/2023 1814 by Nupur Shaikh RN  Outcome: Progressing     Problem: Skin/Tissue Integrity  Goal: Absence of new skin breakdown  Description: 1. Monitor for areas of redness and/or skin breakdown  2. Assess vascular access sites hourly  3. Every 4-6 hours minimum:  Change oxygen saturation probe site  4. Every 4-6 hours:  If on nasal continuous positive airway pressure, respiratory therapy assess nares and determine need for appliance change or resting period.   7/18/2023 1814 by Sherlyn Craft
Problem: Discharge Planning  Goal: Discharge to home or other facility with appropriate resources  7/19/2023 1220 by Stacie Montes RN  Outcome: Progressing  Flowsheets (Taken 7/19/2023 0800)  Discharge to home or other facility with appropriate resources:   Arrange for needed discharge resources and transportation as appropriate   Identify barriers to discharge with patient and caregiver   Identify discharge learning needs (meds, wound care, etc)   Arrange for interpreters to assist at discharge as needed   Refer to discharge planning if patient needs post-hospital services based on physician order or complex needs related to functional status, cognitive ability or social support system     Problem: Pain  Goal: Verbalizes/displays adequate comfort level or baseline comfort level  7/19/2023 1220 by Vanessa Blakely RN  Outcome: Progressing  Flowsheets (Taken 7/19/2023 0800)  Verbalizes/displays adequate comfort level or baseline comfort level:   Encourage patient to monitor pain and request assistance   Assess pain using appropriate pain scale   Administer analgesics based on type and severity of pain and evaluate response   Implement non-pharmacological measures as appropriate and evaluate response   Consider cultural and social influences on pain and pain management   Notify Licensed Independent Practitioner if interventions unsuccessful or patient reports new pain     Problem: Safety - Adult  Goal: Free from fall injury  7/19/2023 1220 by Stacie Montes RN  Outcome: Progressing  Flowsheets (Taken 7/19/2023 0800)  Free From Fall Injury:   Instruct family/caregiver on patient safety   Based on caregiver fall risk screen, instruct family/caregiver to ask for assistance with transferring infant if caregiver noted to have fall risk factors     Problem: Chronic Conditions and Co-morbidities  Goal: Patient's chronic conditions and co-morbidity symptoms are monitored and maintained or improved  7/19/2023
Problem: Discharge Planning  Goal: Discharge to home or other facility with appropriate resources  7/21/2023 0010 by Carmen Matta RN  Outcome: Progressing  Flowsheets (Taken 7/21/2023 0000)  Discharge to home or other facility with appropriate resources: Identify barriers to discharge with patient and caregiver  7/20/2023 2031 by Carmen Matta RN  Outcome: Progressing  Flowsheets (Taken 7/20/2023 2000)  Discharge to home or other facility with appropriate resources: Identify barriers to discharge with patient and caregiver     Problem: Pain  Goal: Verbalizes/displays adequate comfort level or baseline comfort level  7/21/2023 0010 by Carmen Matta RN  Outcome: Progressing  Flowsheets (Taken 7/21/2023 0000)  Verbalizes/displays adequate comfort level or baseline comfort level: Encourage patient to monitor pain and request assistance  7/20/2023 2031 by Carmen Matta RN  Outcome: Progressing  Flowsheets (Taken 7/20/2023 2000)  Verbalizes/displays adequate comfort level or baseline comfort level: Encourage patient to monitor pain and request assistance     Problem: Safety - Adult  Goal: Free from fall injury  7/21/2023 0010 by Carmen Matta RN  Outcome: Progressing  Flowsheets (Taken 7/21/2023 0000)  Free From Fall Injury: Instruct family/caregiver on patient safety  7/20/2023 2031 by Carmen Matta RN  Outcome: Progressing  [address] (Taken 7/20/2023 2000)  Free From Fall Injury: Instruct family/caregiver on patient safety     Problem: Chronic Conditions and Co-morbidities  Goal: Patient's chronic conditions and co-morbidity symptoms are monitored and maintained or improved  7/21/2023 0010 by Carmen Matta RN  Outcome: Progressing  Flowsheets (Taken 7/21/2023 0000)  Care Plan - Patient's Chronic Conditions and Co-Morbidity Symptoms are Monitored and Maintained or Improved: Monitor and assess patient's chronic conditions and comorbid symptoms for stability, deterioration, or
Problem: Discharge Planning  Goal: Discharge to home or other facility with appropriate resources  7/23/2023 1019 by Laura Shrestha RN  Outcome: Progressing  7/23/2023 0103 by Maye Mccord RN  Outcome: Progressing     Problem: Pain  Goal: Verbalizes/displays adequate comfort level or baseline comfort level  7/23/2023 1019 by Laura Shrestha RN  Outcome: Progressing  7/23/2023 0103 by Maye Mccord RN  Outcome: Progressing     Problem: Safety - Adult  Goal: Free from fall injury  7/23/2023 0103 by Maye Mccord RN  Outcome: Progressing     Problem: Chronic Conditions and Co-morbidities  Goal: Patient's chronic conditions and co-morbidity symptoms are monitored and maintained or improved  7/23/2023 0432 by Nayana Carreon RN  Outcome: Progressing  7/23/2023 0103 by Maye Mccord RN  Outcome: Progressing     Problem: Skin/Tissue Integrity  Goal: Absence of new skin breakdown  Description: 1. Monitor for areas of redness and/or skin breakdown  2. Assess vascular access sites hourly  3. Every 4-6 hours minimum:  Change oxygen saturation probe site  4. Every 4-6 hours:  If on nasal continuous positive airway pressure, respiratory therapy assess nares and determine need for appliance change or resting period.   7/23/2023 0103 by aMye Mccord RN  Outcome: Progressing     Problem: ABCDS Injury Assessment  Goal: Absence of physical injury  7/23/2023 0103 by Maye Mccord RN  Outcome: Progressing     Problem: Respiratory - Adult  Goal: Achieves optimal ventilation and oxygenation  7/23/2023 0103 by Maye Mccord RN  Outcome: Progressing     Problem: Neurosensory - Adult  Goal: Achieves stable or improved neurological status  7/23/2023 0432 by Nayana Carreon RN  Outcome: Progressing  7/23/2023 0103 by Maye Mccord RN  Outcome: Progressing  Goal: Absence of seizures  7/23/2023 0432 by Nayana Carreon RN  Outcome: Progressing  7/23/2023 0103 by Maye Mccord
Problem: Discharge Planning  Goal: Discharge to home or other facility with appropriate resources  7/25/2023 0101 by Jeovanny Osborn RN  Outcome: Progressing     Problem: Pain  Goal: Verbalizes/displays adequate comfort level or baseline comfort level  7/25/2023 0101 by Jeovanny Osborn RN  Outcome: Progressing     Problem: Safety - Adult  Goal: Free from fall injury  7/25/2023 0101 by Jeovanny Osborn RN  Outcome: Progressing     Problem: Chronic Conditions and Co-morbidities  Goal: Patient's chronic conditions and co-morbidity symptoms are monitored and maintained or improved  Outcome: Progressing     Problem: Skin/Tissue Integrity  Goal: Absence of new skin breakdown  Description: 1. Monitor for areas of redness and/or skin breakdown  2. Assess vascular access sites hourly  3. Every 4-6 hours minimum:  Change oxygen saturation probe site  4. Every 4-6 hours:  If on nasal continuous positive airway pressure, respiratory therapy assess nares and determine need for appliance change or resting period.   Outcome: Progressing     Problem: ABCDS Injury Assessment  Goal: Absence of physical injury  Outcome: Progressing  Flowsheets (Taken 7/24/2023 2130)  Absence of Physical Injury: Implement safety measures based on patient assessment     Problem: Respiratory - Adult  Goal: Achieves optimal ventilation and oxygenation  Outcome: Progressing     Problem: Neurosensory - Adult  Goal: Achieves stable or improved neurological status  Outcome: Progressing     Problem: Neurosensory - Adult  Goal: Absence of seizures  Outcome: Progressing     Problem: Neurosensory - Adult  Goal: Remains free of injury related to seizures activity  Outcome: Progressing     Problem: Neurosensory - Adult  Goal: Achieves maximal functionality and self care  Outcome: Progressing     Problem: Cardiovascular - Adult  Goal: Maintains optimal cardiac output and hemodynamic stability  Outcome: Progressing     Problem:
Problem: Discharge Planning  Goal: Discharge to home or other facility with appropriate resources  Outcome: Progressing     Problem: Pain  Goal: Verbalizes/displays adequate comfort level or baseline comfort level  Outcome: Progressing     Problem: Chronic Conditions and Co-morbidities  Goal: Patient's chronic conditions and co-morbidity symptoms are monitored and maintained or improved  Outcome: Progressing     Problem: Skin/Tissue Integrity  Goal: Absence of new skin breakdown  Description: 1. Monitor for areas of redness and/or skin breakdown  2. Assess vascular access sites hourly  3. Every 4-6 hours minimum:  Change oxygen saturation probe site  4. Every 4-6 hours:  If on nasal continuous positive airway pressure, respiratory therapy assess nares and determine need for appliance change or resting period.   Outcome: Progressing     Problem: Cardiovascular - Adult  Goal: Absence of cardiac dysrhythmias or at baseline  Outcome: Progressing     Problem: Infection - Adult  Goal: Absence of infection during hospitalization  Outcome: Progressing
Problem: Discharge Planning  Goal: Discharge to home or other facility with appropriate resources  Outcome: Progressing     Problem: Pain  Goal: Verbalizes/displays adequate comfort level or baseline comfort level  Outcome: Progressing     Problem: Safety - Adult  Goal: Free from fall injury  Outcome: Progressing     Problem: Chronic Conditions and Co-morbidities  Goal: Patient's chronic conditions and co-morbidity symptoms are monitored and maintained or improved  Outcome: Progressing     Problem: Neurosensory - Adult  Goal: Achieves stable or improved neurological status  Outcome: Progressing     Problem: Neurosensory - Adult  Goal: Absence of seizures  Outcome: Progressing     Problem: Neurosensory - Adult  Goal: Remains free of injury related to seizures activity  Outcome: Progressing     Problem: Neurosensory - Adult  Goal: Achieves maximal functionality and self care  Outcome: Progressing
Problem: Discharge Planning  Goal: Discharge to home or other facility with appropriate resources  Outcome: Progressing     Problem: Pain  Goal: Verbalizes/displays adequate comfort level or baseline comfort level  Outcome: Progressing     Problem: Safety - Adult  Goal: Free from fall injury  Outcome: Progressing     Problem: Chronic Conditions and Co-morbidities  Goal: Patient's chronic conditions and co-morbidity symptoms are monitored and maintained or improved  Outcome: Progressing     Problem: Respiratory - Adult  Goal: Achieves optimal ventilation and oxygenation  Outcome: Progressing     Problem: Skin/Tissue Integrity - Adult  Goal: Skin integrity remains intact  Outcome: Progressing  Goal: Incisions, wounds, or drain sites healing without S/S of infection  Outcome: Progressing  Goal: Oral mucous membranes remain intact  Outcome: Progressing     Problem: Metabolic/Fluid and Electrolytes - Adult  Goal: Electrolytes maintained within normal limits  Outcome: Progressing  Goal: Glucose maintained within prescribed range  Outcome: Progressing
Problem: Discharge Planning  Goal: Discharge to home or other facility with appropriate resources  Outcome: Progressing     Problem: Pain  Goal: Verbalizes/displays adequate comfort level or baseline comfort level  Outcome: Progressing     Problem: Safety - Adult  Goal: Free from fall injury  Outcome: Progressing     Problem: Chronic Conditions and Co-morbidities  Goal: Patient's chronic conditions and co-morbidity symptoms are monitored and maintained or improved  Outcome: Progressing     Problem: Skin/Tissue Integrity  Goal: Absence of new skin breakdown  Description: 1. Monitor for areas of redness and/or skin breakdown  2. Assess vascular access sites hourly  3. Every 4-6 hours minimum:  Change oxygen saturation probe site  4. Every 4-6 hours:  If on nasal continuous positive airway pressure, respiratory therapy assess nares and determine need for appliance change or resting period.   Outcome: Progressing     Problem: ABCDS Injury Assessment  Goal: Absence of physical injury  Outcome: Progressing     Problem: Respiratory - Adult  Goal: Achieves optimal ventilation and oxygenation  Outcome: Progressing     Problem: Neurosensory - Adult  Goal: Achieves stable or improved neurological status  Outcome: Progressing  Goal: Absence of seizures  Outcome: Progressing  Goal: Remains free of injury related to seizures activity  Outcome: Progressing  Goal: Achieves maximal functionality and self care  Outcome: Progressing     Problem: Cardiovascular - Adult  Goal: Maintains optimal cardiac output and hemodynamic stability  Outcome: Progressing  Goal: Absence of cardiac dysrhythmias or at baseline  Outcome: Progressing     Problem: Skin/Tissue Integrity - Adult  Goal: Skin integrity remains intact  Outcome: Progressing  Goal: Incisions, wounds, or drain sites healing without S/S of infection  Outcome: Progressing  Goal: Oral mucous membranes remain intact  Outcome: Progressing     Problem: Gastrointestinal -
Problem: Discharge Planning  Goal: Discharge to home or other facility with appropriate resources  Outcome: Progressing     Problem: Pain  Goal: Verbalizes/displays adequate comfort level or baseline comfort level  Outcome: Progressing     Problem: Safety - Adult  Goal: Free from fall injury  Outcome: Progressing     Problem: Chronic Conditions and Co-morbidities  Goal: Patient's chronic conditions and co-morbidity symptoms are monitored and maintained or improved  Outcome: Progressing     Problem: Skin/Tissue Integrity  Goal: Absence of new skin breakdown  Outcome: Progressing     Problem: ABCDS Injury Assessment  Goal: Absence of physical injury  Outcome: Progressing     Problem: Respiratory - Adult  Goal: Achieves optimal ventilation and oxygenation  Outcome: Progressing     Problem: Neurosensory - Adult  Goal: Achieves stable or improved neurological status  Outcome: Progressing  Goal: Absence of seizures  Outcome: Progressing  Goal: Remains free of injury related to seizures activity  Outcome: Progressing  Goal: Achieves maximal functionality and self care  Outcome: Progressing     Problem: Cardiovascular - Adult  Goal: Maintains optimal cardiac output and hemodynamic stability  Outcome: Progressing  Goal: Absence of cardiac dysrhythmias or at baseline  Outcome: Progressing     Problem: Skin/Tissue Integrity - Adult  Goal: Skin integrity remains intact  Outcome: Progressing  Goal: Incisions, wounds, or drain sites healing without S/S of infection  Outcome: Progressing  Goal: Oral mucous membranes remain intact  Outcome: Progressing     Problem: Gastrointestinal - Adult  Goal: Minimal or absence of nausea and vomiting  Outcome: Progressing  Goal: Maintains or returns to baseline bowel function  Outcome: Progressing  Goal: Maintains adequate nutritional intake  Outcome: Progressing  Goal: Establish and maintain optimal ostomy function  Outcome: Completed     Problem: Genitourinary - Adult  Goal: Absence of
Problem: Discharge Planning  Goal: Discharge to home or other facility with appropriate resources  Outcome: Progressing     Problem: Pain  Goal: Verbalizes/displays adequate comfort level or baseline comfort level  Outcome: Progressing     Problem: Safety - Adult  Goal: Free from fall injury  Outcome: Progressing  Flowsheets (Taken 7/27/2023 2130)  Free From Fall Injury: Instruct family/caregiver on patient safety     Problem: Chronic Conditions and Co-morbidities  Goal: Patient's chronic conditions and co-morbidity symptoms are monitored and maintained or improved  Outcome: Progressing     Problem: Skin/Tissue Integrity  Goal: Absence of new skin breakdown  Description: 1. Monitor for areas of redness and/or skin breakdown  2. Assess vascular access sites hourly  3. Every 4-6 hours minimum:  Change oxygen saturation probe site  4. Every 4-6 hours:  If on nasal continuous positive airway pressure, respiratory therapy assess nares and determine need for appliance change or resting period.   Outcome: Progressing     Problem: ABCDS Injury Assessment  Goal: Absence of physical injury  Outcome: Progressing  Flowsheets (Taken 7/27/2023 2130)  Absence of Physical Injury: Implement safety measures based on patient assessment     Problem: Respiratory - Adult  Goal: Achieves optimal ventilation and oxygenation  Outcome: Progressing     Problem: Neurosensory - Adult  Goal: Achieves stable or improved neurological status  Outcome: Progressing     Problem: Neurosensory - Adult  Goal: Absence of seizures  Outcome: Progressing     Problem: Neurosensory - Adult  Goal: Remains free of injury related to seizures activity  Outcome: Progressing     Problem: Neurosensory - Adult  Goal: Achieves maximal functionality and self care  Outcome: Progressing     Problem: Cardiovascular - Adult  Goal: Maintains optimal cardiac output and hemodynamic stability  Outcome: Progressing  Flowsheets (Taken 7/27/2023 2130)  Maintains optimal
Problem: Discharge Planning  Goal: Discharge to home or other facility with appropriate resources  Outcome: Progressing  Flowsheets  Taken 7/15/2023 2000 by Anna Cotton RN  Discharge to home or other facility with appropriate resources: Identify barriers to discharge with patient and caregiver  Taken 7/15/2023 1400 by Rafa Jolly RN  Discharge to home or other facility with appropriate resources: Identify barriers to discharge with patient and caregiver     Problem: Pain  Goal: Verbalizes/displays adequate comfort level or baseline comfort level  Outcome: Progressing     Problem: Safety - Adult  Goal: Free from fall injury  Outcome: Progressing  Flowsheets (Taken 7/15/2023 2000)  Free From Fall Injury: Instruct family/caregiver on patient safety     Problem: Chronic Conditions and Co-morbidities  Goal: Patient's chronic conditions and co-morbidity symptoms are monitored and maintained or improved  Outcome: Progressing  Flowsheets  Taken 7/15/2023 2000 by Davide Espinosa [facility] - Patient's Chronic Conditions and Co-Morbidity Symptoms are Monitored and Maintained or Improved: Monitor and assess patient's chronic conditions and comorbid symptoms for stability, deterioration, or improvement  Taken 7/15/2023 1400 by Davide Cagle [facility] - Patient's Chronic Conditions and Co-Morbidity Symptoms are Monitored and Maintained or Improved: Monitor and assess patient's chronic conditions and comorbid symptoms for stability, deterioration, or improvement     Problem: Safety - Medical Restraint  Goal: Remains free of injury from restraints (Restraint for Interference with Medical Device)  Description: INTERVENTIONS:  1. Determine that other, less restrictive measures have been tried or would not be effective before applying the restraint  2. Evaluate the patient's condition at the time of restraint application  3. Inform patient/family regarding the reason for restraint  4.  Q2H: Monitor safety,
Problem: Discharge Planning  Goal: Discharge to home or other facility with appropriate resources  Outcome: Progressing  Flowsheets (Taken 7/21/2023 2000)  Discharge to home or other facility with appropriate resources: Identify barriers to discharge with patient and caregiver     Problem: Pain  Goal: Verbalizes/displays adequate comfort level or baseline comfort level  Outcome: Progressing  Flowsheets (Taken 7/21/2023 2000)  Verbalizes/displays adequate comfort level or baseline comfort level:   Assess pain using appropriate pain scale   Administer analgesics based on type and severity of pain and evaluate response     Problem: Safety - Adult  Goal: Free from fall injury  Outcome: Progressing  Flowsheets (Taken 7/21/2023 2000)  Free From Fall Injury:   Instruct family/caregiver on patient safety   Based on caregiver fall risk screen, instruct family/caregiver to ask for assistance with transferring infant if caregiver noted to have fall risk factors     Problem: Skin/Tissue Integrity  Goal: Absence of new skin breakdown  Description: 1. Monitor for areas of redness and/or skin breakdown  2. Assess vascular access sites hourly  3. Every 4-6 hours minimum:  Change oxygen saturation probe site  4. Every 4-6 hours:  If on nasal continuous positive airway pressure, respiratory therapy assess nares and determine need for appliance change or resting period. Outcome: Progressing     Problem: ABCDS Injury Assessment  Goal: Absence of physical injury  Outcome: Progressing  Flowsheets (Taken 7/21/2023 2000)  Absence of Physical Injury: Implement safety measures based on patient assessment     Problem: Neurosensory - Adult  Goal: Absence of seizures  Outcome: Progressing  Flowsheets (Taken 7/21/2023 2000)  Absence of seizures: Monitor for seizure activity.   If seizure occurs, document type and location of movements and any associated apnea     Problem: Neurosensory - Adult  Goal: Remains free of injury related to
Problem: Gastrointestinal - Adult  Goal: Maintains adequate nutritional intake  7/17/2023 1813 by Enoc Obando RN  Outcome: Not Progressing  7/17/2023 1359 by Enoc Obando RN  Outcome: Not Progressing  Flowsheets (Taken 7/17/2023 0800 by Jay Lott, RN)  Maintains adequate nutritional intake: Monitor intake and output, weight and lab values     Problem: Discharge Planning  Goal: Discharge to home or other facility with appropriate resources  7/17/2023 1813 by Enoc Obando RN  Outcome: Progressing  7/17/2023 1359 by Enoc Obando RN  Outcome: Progressing  Flowsheets (Taken 7/17/2023 0800)  Discharge to home or other facility with appropriate resources:   Identify barriers to discharge with patient and caregiver   Arrange for needed discharge resources and transportation as appropriate   Identify discharge learning needs (meds, wound care, etc)   Arrange for interpreters to assist at discharge as needed   Refer to discharge planning if patient needs post-hospital services based on physician order or complex needs related to functional status, cognitive ability or social support system     Problem: Pain  Goal: Verbalizes/displays adequate comfort level or baseline comfort level  7/17/2023 1813 by Enoc Obando RN  Outcome: Progressing  7/17/2023 1359 by Enoc Obando RN  Outcome: Progressing  Flowsheets (Taken 7/17/2023 0800 by Jay Lott, RN)  Verbalizes/displays adequate comfort level or baseline comfort level:   Encourage patient to monitor pain and request assistance   Assess pain using appropriate pain scale   Administer analgesics based on type and severity of pain and evaluate response   Implement non-pharmacological measures as appropriate and evaluate response   Consider cultural and social influences on pain and pain management   Notify Licensed Independent Practitioner if interventions unsuccessful or patient reports new pain     Problem: Safety - Adult  Goal:
Problem: Pain  Goal: Verbalizes/displays adequate comfort level or baseline comfort level  7/17/2023 0021 by Carmen Matta RN  Outcome: Progressing  Flowsheets (Taken 7/17/2023 0000)  Verbalizes/displays adequate comfort level or baseline comfort level: Encourage patient to monitor pain and request assistance  7/16/2023 2104 by Carmen Matta RN  Outcome: Progressing  Flowsheets (Taken 7/16/2023 2000)  Verbalizes/displays adequate comfort level or baseline comfort level: Encourage patient to monitor pain and request assistance  7/16/2023 1639 by Jaz Ellison RN  Outcome: Progressing     Problem: Safety - Adult  Goal: Free from fall injury  7/17/2023 0021 by Carmen Matta RN  Outcome: Progressing  Flowsheets (Taken 7/17/2023 0000)  Free From Fall Injury: Instruct family/caregiver on patient safety  7/16/2023 2104 by Carmen Matta RN  Outcome: Progressing  [address] (Taken 7/16/2023 2000)  Free From Fall Injury: Instruct family/caregiver on patient safety  7/16/2023 1639 by Jaz Ellison RN  Outcome: Progressing     Problem: Chronic Conditions and Co-morbidities  Goal: Patient's chronic conditions and co-morbidity symptoms are monitored and maintained or improved  7/17/2023 0021 by Carmen Matta RN  Outcome: Progressing  Flowsheets (Taken 7/17/2023 0000)  Care Plan - Patient's Chronic Conditions and Co-Morbidity Symptoms are Monitored and Maintained or Improved: Monitor and assess patient's chronic conditions and comorbid symptoms for stability, deterioration, or improvement  7/16/2023 2104 by Carmen Matta RN  Outcome: Progressing  Flowsheets (Taken 7/16/2023 2000)  Care Plan - Patient's Chronic Conditions and Co-Morbidity Symptoms are Monitored and Maintained or Improved: Monitor and assess patient's chronic conditions and comorbid symptoms for stability, deterioration, or improvement  7/16/2023 1639 by Jaz Ellison RN  Outcome: Progressing     Problem: Safety - Medical
Problem: Respiratory - Adult  Goal: Achieves optimal ventilation and oxygenation  Outcome: Progressing  Flowsheets (Taken 7/16/2023 7988)  Achieves optimal ventilation and oxygenation:   Assess for changes in respiratory status   Position to facilitate oxygenation and minimize respiratory effort   Assess the need for suctioning and aspirate as needed   Respiratory therapy support as indicated   Oxygen supplementation based on oxygen saturation or arterial blood gases  Note: Wean FiO2 as tolerated
Problem: Safety - Medical Restraint  Goal: Remains free of injury from restraints (Restraint for Interference with Medical Device)  Description: INTERVENTIONS:  1. Determine that other, less restrictive measures have been tried or would not be effective before applying the restraint  2. Evaluate the patient's condition at the time of restraint application  3. Inform patient/family regarding the reason for restraint  4.  Q2H: Monitor safety, psychosocial status, comfort, nutrition and hydration  7/20/2023 2032 by Kim Guan RN  Outcome: Completed  7/20/2023 2031 by Kim Guan RN  Outcome: Progressing  7/20/2023 0646 by Seamus Connell RN  Outcome: Progressing  Flowsheets (Taken 7/20/2023 6472)  Remains free of injury from restraints (restraint for interference with medical device):   Every 2 hours: Monitor safety, psychosocial status, comfort, nutrition and hydration   Determine that other, less restrictive measures have been tried or would not be effective before applying the restraint   Evaluate the patient's condition at the time of restraint application   Inform patient/family regarding the reason for restraint
Problem: Safety - Medical Restraint  Goal: Remains free of injury from restraints (Restraint for Interference with Medical Device)  Description: INTERVENTIONS:  1. Determine that other, less restrictive measures have been tried or would not be effective before applying the restraint  2. Evaluate the patient's condition at the time of restraint application  3. Inform patient/family regarding the reason for restraint  4.  Q2H: Monitor safety, psychosocial status, comfort, nutrition and hydration  Outcome: Progressing  Flowsheets (Taken 7/20/2023 0696)  Remains free of injury from restraints (restraint for interference with medical device):   Every 2 hours: Monitor safety, psychosocial status, comfort, nutrition and hydration   Determine that other, less restrictive measures have been tried or would not be effective before applying the restraint   Evaluate the patient's condition at the time of restraint application   Inform patient/family regarding the reason for restraint     Problem: Respiratory - Adult  Goal: Achieves optimal ventilation and oxygenation  Outcome: Progressing  Flowsheets (Taken 7/20/2023 0646)  Achieves optimal ventilation and oxygenation:   Assess for changes in respiratory status   Assess for changes in mentation and behavior     Problem: Neurosensory - Adult  Goal: Achieves stable or improved neurological status  Outcome: Progressing  Flowsheets (Taken 7/20/2023 0646)  Achieves stable or improved neurological status: Assess for and report changes in neurological status     Problem: Cardiovascular - Adult  Goal: Maintains optimal cardiac output and hemodynamic stability  Outcome: Progressing  Flowsheets (Taken 7/20/2023 0646)  Maintains optimal cardiac output and hemodynamic stability:   Monitor blood pressure and heart rate   Monitor urine output and notify Licensed Independent Practitioner for values outside of normal range   Assess for signs of decreased cardiac output     Problem:
Absence of seizures  7/30/2023 1324 by Mily Arriaza RN  Outcome: Completed  7/30/2023 0158 by Ada Whitman RN  Outcome: Progressing  Goal: Remains free of injury related to seizures activity  7/30/2023 1324 by Mily Arriaza RN  Outcome: Completed  7/30/2023 0158 by Ada Whitman RN  Outcome: Progressing  Goal: Achieves maximal functionality and self care  7/30/2023 1324 by Mily Arriaza RN  Outcome: Completed  7/30/2023 0158 by Ada Whitman RN  Outcome: Progressing     Problem: Cardiovascular - Adult  Goal: Maintains optimal cardiac output and hemodynamic stability  Outcome: Completed  Goal: Absence of cardiac dysrhythmias or at baseline  Outcome: Completed     Problem: Skin/Tissue Integrity - Adult  Goal: Skin integrity remains intact  7/30/2023 1324 by Mily Arriaza RN  Outcome: Completed  7/30/2023 0158 by Ada Whitman RN  Outcome: Progressing  Goal: Incisions, wounds, or drain sites healing without S/S of infection  7/30/2023 1324 by Mily Arriaza RN  Outcome: Completed  7/30/2023 0158 by Ada Whitman RN  Outcome: Progressing  Goal: Oral mucous membranes remain intact  7/30/2023 1324 by Mily Arriaza RN  Outcome: Completed  7/30/2023 0158 by Ada Whitman RN  Outcome: Progressing     Problem: Gastrointestinal - Adult  Goal: Minimal or absence of nausea and vomiting  7/30/2023 1324 by Mily Arriaza RN  Outcome: Completed  7/30/2023 0158 by Ada Whitman RN  Outcome: Progressing  Goal: Maintains or returns to baseline bowel function  7/30/2023 1324 by Mily Arriaza RN  Outcome: Completed  7/30/2023 0158 by Ada Whitman RN  Outcome: Progressing  Goal: Maintains adequate nutritional intake  Outcome: Completed     Problem: Genitourinary - Adult  Goal: Absence of urinary retention  Outcome: Completed     Problem:
Co-Morbidity Symptoms are Monitored and Maintained or Improved: Monitor and assess patient's chronic conditions and comorbid symptoms for stability, deterioration, or improvement  Taken 7/15/2023 1400 by Davide Philip - Patient's Chronic Conditions and Co-Morbidity Symptoms are Monitored and Maintained or Improved: Monitor and assess patient's chronic conditions and comorbid symptoms for stability, deterioration, or improvement     Problem: Safety - Medical Restraint  Goal: Remains free of injury from restraints (Restraint for Interference with Medical Device)  Description: INTERVENTIONS:  1. Determine that other, less restrictive measures have been tried or would not be effective before applying the restraint  2. Evaluate the patient's condition at the time of restraint application  3. Inform patient/family regarding the reason for restraint  4.  Q2H: Monitor safety, psychosocial status, comfort, nutrition and hydration  7/16/2023 0920 by Janay Courtney RN  Outcome: Progressing  7/16/2023 0025 by Natalya Moses RN  Outcome: Progressing  Flowsheets (Taken 7/16/2023 0000)  Remains free of injury from restraints (restraint for interference with medical device):   Determine that other, less restrictive measures have been tried or would not be effective before applying the restraint   Every 2 hours: Monitor safety, psychosocial status, comfort, nutrition and hydration  7/15/2023 2109 by Natalya Moses RN  Outcome: Progressing  Flowsheets  Taken 7/15/2023 2000 by Natalya Moses RN  Remains free of injury from restraints (restraint for interference with medical device): Determine that other, less restrictive measures have been tried or would not be effective before applying the restraint  Taken 7/15/2023 1400 by Radha Palomares RN  Remains free of injury from restraints (restraint for interference with medical device): Determine that other, less restrictive measures have been tried or would not be
MELIDA Mauro  Outcome: Progressing  Flowsheets (Taken 7/18/2023 0800)  Achieves optimal ventilation and oxygenation: Assess for changes in respiratory status     Problem: Neurosensory - Adult  Goal: Achieves stable or improved neurological status  Outcome: Progressing  Flowsheets (Taken 7/18/2023 0800)  Achieves stable or improved neurological status: Assess for and report changes in neurological status     Problem: Neurosensory - Adult  Goal: Absence of seizures  Outcome: Progressing  Flowsheets (Taken 7/18/2023 0800)  Absence of seizures: Monitor for seizure activity.   If seizure occurs, document type and location of movements and any associated apnea     Problem: Neurosensory - Adult  Goal: Remains free of injury related to seizures activity  Outcome: Progressing  Flowsheets (Taken 7/18/2023 0800)  Remains free of injury related to seizure activity: Maintain airway, patient safety  and administer oxygen as ordered     Problem: Neurosensory - Adult  Goal: Achieves maximal functionality and self care  Outcome: Progressing  Flowsheets (Taken 7/18/2023 0800)  Achieves maximal functionality and self care: Monitor swallowing and airway patency with patient fatigue and changes in neurological status     Problem: Cardiovascular - Adult  Goal: Maintains optimal cardiac output and hemodynamic stability  Outcome: Progressing  Flowsheets (Taken 7/18/2023 0800)  Maintains optimal cardiac output and hemodynamic stability: Monitor blood pressure and heart rate     Problem: Cardiovascular - Adult  Goal: Absence of cardiac dysrhythmias or at baseline  Outcome: Progressing  Flowsheets (Taken 7/18/2023 0800)  Absence of cardiac dysrhythmias or at baseline: Monitor cardiac rate and rhythm     Problem: Skin/Tissue Integrity - Adult  Goal: Skin integrity remains intact  Outcome: Progressing  Flowsheets (Taken 7/18/2023 0800)  Skin Integrity Remains Intact: Monitor for areas of redness and/or skin breakdown     Problem: Skin/Tissue
Respiratory - Adult  Goal: Achieves optimal ventilation and oxygenation  7/16/2023 2104 by Eligio Dakin, RN  Outcome: Progressing  Flowsheets (Taken 7/16/2023 2000)  Achieves optimal ventilation and oxygenation: Assess for changes in respiratory status  7/16/2023 0920 by Scarlet Virk RN  Outcome: Progressing  7/16/2023 0850 by Ezequiel Conklin RCP  Outcome: Progressing  Flowsheets (Taken 7/16/2023 0850)  Achieves optimal ventilation and oxygenation:   Assess for changes in respiratory status   Position to facilitate oxygenation and minimize respiratory effort   Assess the need for suctioning and aspirate as needed   Respiratory therapy support as indicated   Oxygen supplementation based on oxygen saturation or arterial blood gases  Note: Wean FiO2 as tolerated     Problem: Discharge Planning  Goal: Discharge to home or other facility with appropriate resources  7/16/2023 2104 by Eligio Dakin, RN  Outcome: Progressing  Flowsheets (Taken 7/16/2023 2000)  Discharge to home or other facility with appropriate resources: Identify barriers to discharge with patient and caregiver  7/16/2023 1639 by Scarlet Virk RN  Outcome: Not Progressing  7/16/2023 0920 by Scarlet Virk RN  Outcome: Not Progressing
RN  Outcome: Progressing  7/28/2023 0547 by Satnam Poe RN  Outcome: Progressing  Flowsheets (Taken 7/27/2023 2130)  Absence of infection during hospitalization: Assess and monitor for signs and symptoms of infection     Problem: Metabolic/Fluid and Electrolytes - Adult  Goal: Electrolytes maintained within normal limits  7/28/2023 1104 by Rajni Valderrama RN  Outcome: Progressing  7/28/2023 0547 by Satnam Poe RN  Outcome: Progressing  Goal: Glucose maintained within prescribed range  7/28/2023 1104 by Rajni Valderrama RN  Outcome: Progressing  7/28/2023 0547 by Satnam Poe RN  Outcome: Progressing     Problem: Hematologic - Adult  Goal: Maintains hematologic stability  7/28/2023 1104 by Rajni Valderrama RN  Outcome: Progressing  7/28/2023 0547 by Satnam Poe RN  Outcome: Progressing     Problem: Nutrition Deficit:  Goal: Optimize nutritional status  7/28/2023 1104 by Rajni Valderrama RN  Outcome: Progressing  7/28/2023 0547 by Satnam Poe RN  Outcome: Progressing
optimal ostomy function: Infuse IV Fluids/TPN as ordered     Problem: Genitourinary - Adult  Goal: Absence of urinary retention  Outcome: Progressing  Flowsheets (Taken 7/20/2023 2000)  Absence of urinary retention: Monitor intake/output and perform bladder scan as needed  Goal: Urinary catheter remains patent  Outcome: Progressing  Flowsheets (Taken 7/20/2023 2000)  Urinary catheter remains patent: Assess patency of urinary catheter     Problem: Infection - Adult  Goal: Absence of infection at discharge  Outcome: Progressing  Flowsheets (Taken 7/20/2023 2000)  Absence of infection at discharge: Assess and monitor for signs and symptoms of infection  Goal: Absence of infection during hospitalization  Outcome: Progressing  Flowsheets (Taken 7/20/2023 2000)  Absence of infection during hospitalization: Assess and monitor for signs and symptoms of infection     Problem: Metabolic/Fluid and Electrolytes - Adult  Goal: Electrolytes maintained within normal limits  7/20/2023 2031 by Janeth Carroll RN  Outcome: Progressing  Flowsheets (Taken 7/20/2023 2000)  Electrolytes maintained within normal limits: Monitor labs and assess patient for signs and symptoms of electrolyte imbalances  7/20/2023 0646 by Lynda Ahumada RN  Outcome: Progressing  Flowsheets (Taken 7/20/2023 0755)  Electrolytes maintained within normal limits:   Monitor labs and assess patient for signs and symptoms of electrolyte imbalances   Administer electrolyte replacement as ordered   Monitor response to electrolyte replacements, including repeat lab results as appropriate  Goal: Glucose maintained within prescribed range  Outcome: Progressing  Flowsheets (Taken 7/20/2023 2000)  Glucose maintained within prescribed range: Monitor blood glucose as ordered     Problem: Hematologic - Adult  Goal: Maintains hematologic stability  Outcome: Progressing  Flowsheets (Taken 7/20/2023 2000)  Maintains hematologic stability: Assess for signs and symptoms of
Progressing  7/18/2023 1814 by Silas Le RN  Outcome: Progressing     Problem: Metabolic/Fluid and Electrolytes - Adult  Goal: Electrolytes maintained within normal limits  7/19/2023 0523 by Denisa Martinez RN  Outcome: Progressing  7/18/2023 1814 by Silas Le RN  Outcome: Progressing  Goal: Glucose maintained within prescribed range  7/19/2023 0523 by Denisa Martinez RN  Outcome: Progressing  7/18/2023 1814 by Silas Le RN  Outcome: Progressing     Problem: Hematologic - Adult  Goal: Maintains hematologic stability  7/19/2023 0523 by Denisa Martinez RN  Outcome: Progressing  7/18/2023 1814 by Silas Le RN  Outcome: Progressing

## 2023-07-30 NOTE — CARE COORDINATION
SOCIAL WORK/CASEMANAGEMENT TRANSITION OF CARE PLANNINGCorrmelita GONZALEZ, [facility]):  discharge to [facility] today. [facility]  at 3:30 p.m. exempt done by albino prior when I went to finish it. Called partner, casi to notify her and she in agreement.   ANJEL Lin  7/30/2023

## 2023-07-30 NOTE — DISCHARGE SUMMARY
tablet by mouth daily, Disp-120 tablet, R-0Normal      Respiratory Therapy Supplies (FULL KIT NEBULIZER SET) MISC Disp-1 each, R-0, PrintUse as directed with nebulized medication. Details   warfarin (COUMADIN) 4 MG tablet Take 1 tablet by mouth daily, Disp-30 tablet, R-1Normal      levETIRAcetam (KEPPRA) 1000 MG tablet Take 1 tablet by mouth 2 times daily, Disp-60 tablet, R-3Normal                Details   folic acid (FOLVITE) 1 MG tablet Take 1 tablet by mouth dailyHistorical Med      aspirin 81 MG EC tablet Take 1 tablet by mouth daily, Disp-30 tablet, R-3Normal      nitroGLYCERIN (NITROSTAT) 0.4 MG SL tablet Place 1 tablet under the tongue every 5 minutes as needed for Chest pain up to max of 3 total doses. If no relief after 1 dose, call 911., Disp-25 tablet, R-3Normal      DULoxetine (CYMBALTA) 30 MG extended release capsule Take 1 capsule by mouth daily, Disp-30 capsule, R-3Normal      metoprolol succinate (TOPROL XL) 25 MG extended release tablet Take 1 tablet by mouth daily, Disp-30 tablet, R-3Normal      sucralfate (CARAFATE) 1 GM tablet Take 1 tablet by mouth 4 times daily (before meals and nightly), Disp-120 tablet, R-3Normal      rosuvastatin (CRESTOR) 10 MG tablet Take 1 tablet by mouth every morningHistorical Med      tamsulosin (FLOMAX) 0.4 MG capsule Take 1 capsule by mouth every morningHistorical Med      nicotine polacrilex (COMMIT) 2 MG lozenge Take 1 lozenge by mouth every 2 hours as needed for Smoking cessation, Disp-100 each, R-3Normal             Time Spent on discharge is more than 30 minutes in the examination, evaluation, counseling and review of medications and discharge plan.       Signed:    Ehsan Hutchins MD   7/30/2023

## 2023-12-11 ENCOUNTER — HOSPITAL ENCOUNTER (EMERGENCY)
Age: 58
Discharge: HOME | End: 2023-12-11
Attending: EMERGENCY MEDICINE

## 2023-12-11 ENCOUNTER — APPOINTMENT (OUTPATIENT)
Dept: GENERAL RADIOLOGY | Age: 58
End: 2023-12-11

## 2023-12-11 VITALS
TEMPERATURE: 98.1 F | DIASTOLIC BLOOD PRESSURE: 85 MMHG | HEART RATE: 96 BPM | OXYGEN SATURATION: 96 % | RESPIRATION RATE: 16 BRPM | WEIGHT: 235 LBS | BODY MASS INDEX: 33.72 KG/M2 | SYSTOLIC BLOOD PRESSURE: 142 MMHG

## 2023-12-11 DIAGNOSIS — R06.09 DYSPNEA ON EXERTION: Primary | ICD-10-CM

## 2023-12-11 LAB
ABO + RH BLD: NORMAL
ALBUMIN SERPL-MCNC: 4.2 G/DL (ref 3.5–5.2)
ALP SERPL-CCNC: 135 U/L (ref 40–129)
ALT SERPL-CCNC: 17 U/L (ref 0–40)
ANION GAP SERPL CALCULATED.3IONS-SCNC: 16 MMOL/L (ref 7–16)
ARM BAND NUMBER: NORMAL
AST SERPL-CCNC: 51 U/L (ref 0–39)
BASOPHILS # BLD: 0.07 K/UL (ref 0–0.2)
BASOPHILS NFR BLD: 1 % (ref 0–2)
BILIRUB SERPL-MCNC: 0.7 MG/DL (ref 0–1.2)
BLOOD BANK SAMPLE EXPIRATION: NORMAL
BLOOD GROUP ANTIBODIES SERPL: NEGATIVE
BNP SERPL-MCNC: 916 PG/ML (ref 0–125)
BUN SERPL-MCNC: 20 MG/DL (ref 6–20)
CALCIUM SERPL-MCNC: 8.7 MG/DL (ref 8.6–10.2)
CHLORIDE SERPL-SCNC: 102 MMOL/L (ref 98–107)
CO2 SERPL-SCNC: 20 MMOL/L (ref 22–29)
CREAT SERPL-MCNC: 1.7 MG/DL (ref 0.7–1.2)
EOSINOPHIL # BLD: 0.14 K/UL (ref 0.05–0.5)
EOSINOPHILS RELATIVE PERCENT: 2 % (ref 0–6)
ERYTHROCYTE [DISTWIDTH] IN BLOOD BY AUTOMATED COUNT: 17 % (ref 11.5–15)
GFR SERPL CREATININE-BSD FRML MDRD: 48 ML/MIN/1.73M2
GLUCOSE SERPL-MCNC: 114 MG/DL (ref 74–99)
HCT VFR BLD AUTO: 40.6 % (ref 37–54)
HGB BLD-MCNC: 12.1 G/DL (ref 12.5–16.5)
IMM GRANULOCYTES # BLD AUTO: <0.03 K/UL (ref 0–0.58)
IMM GRANULOCYTES NFR BLD: 0 % (ref 0–5)
LYMPHOCYTES NFR BLD: 1.17 K/UL (ref 1.5–4)
LYMPHOCYTES RELATIVE PERCENT: 18 % (ref 20–42)
MCH RBC QN AUTO: 24.7 PG (ref 26–35)
MCHC RBC AUTO-ENTMCNC: 29.8 G/DL (ref 32–34.5)
MCV RBC AUTO: 82.9 FL (ref 80–99.9)
MONOCYTES NFR BLD: 0.57 K/UL (ref 0.1–0.95)
MONOCYTES NFR BLD: 9 % (ref 2–12)
NEUTROPHILS NFR BLD: 69 % (ref 43–80)
NEUTS SEG NFR BLD: 4.4 K/UL (ref 1.8–7.3)
PLATELET # BLD AUTO: 155 K/UL (ref 130–450)
PMV BLD AUTO: 11.7 FL (ref 7–12)
POTASSIUM SERPL-SCNC: 4.9 MMOL/L (ref 3.5–5)
PROT SERPL-MCNC: 7.7 G/DL (ref 6.4–8.3)
RBC # BLD AUTO: 4.9 M/UL (ref 3.8–5.8)
SODIUM SERPL-SCNC: 138 MMOL/L (ref 132–146)
TROPONIN I SERPL HS-MCNC: 36 NG/L (ref 0–11)
WBC OTHER # BLD: 6.4 K/UL (ref 4.5–11.5)

## 2023-12-11 PROCEDURE — 85025 COMPLETE CBC W/AUTO DIFF WBC: CPT

## 2023-12-11 PROCEDURE — 86900 BLOOD TYPING SEROLOGIC ABO: CPT

## 2023-12-11 PROCEDURE — 86850 RBC ANTIBODY SCREEN: CPT

## 2023-12-11 PROCEDURE — 99285 EMERGENCY DEPT VISIT HI MDM: CPT

## 2023-12-11 PROCEDURE — 84484 ASSAY OF TROPONIN QUANT: CPT

## 2023-12-11 PROCEDURE — 93005 ELECTROCARDIOGRAM TRACING: CPT

## 2023-12-11 PROCEDURE — 80053 COMPREHEN METABOLIC PANEL: CPT

## 2023-12-11 PROCEDURE — 71045 X-RAY EXAM CHEST 1 VIEW: CPT

## 2023-12-11 PROCEDURE — 86901 BLOOD TYPING SEROLOGIC RH(D): CPT

## 2023-12-11 PROCEDURE — 83880 ASSAY OF NATRIURETIC PEPTIDE: CPT

## 2023-12-11 RX ORDER — FUROSEMIDE 20 MG/1
20 TABLET ORAL DAILY
Qty: 14 TABLET | Refills: 0 | Status: SHIPPED | OUTPATIENT
Start: 2023-12-11 | End: 2023-12-25

## 2023-12-11 ASSESSMENT — LIFESTYLE VARIABLES
HOW MANY STANDARD DRINKS CONTAINING ALCOHOL DO YOU HAVE ON A TYPICAL DAY: 3 OR 4
HOW OFTEN DO YOU HAVE A DRINK CONTAINING ALCOHOL: 4 OR MORE TIMES A WEEK

## 2023-12-11 ASSESSMENT — PAIN - FUNCTIONAL ASSESSMENT: PAIN_FUNCTIONAL_ASSESSMENT: NONE - DENIES PAIN

## 2023-12-11 NOTE — ED NOTES
Department of Emergency Medicine  FIRST PROVIDER TRIAGE NOTE             Independent MLP           12/11/23  2:49 PM EST    Date of Encounter: 12/11/23   MRN: [id number]      HPI: Keely Read is a 63 y.o. male who presents to the ED for No chief complaint on file. Patient is a 60-year-old states for the last month he has had shortness of breath over the last week or 2 has had worsening shortness of breath. Patient has a pacemaker had an interrogation recently and brought the paperwork. Patient was told he has \"fluid on the lungs. \"  Patient has no chest pain. ROS: Negative for cough, vomiting, diarrhea, or urinary complaints. PE: Gen Appearance/Constitutional: alert  HEENT: NC/NT. PERRLA,  Airway patent. Neck: supple     Initial Plan of Care: All treatment areas with department are currently occupied. Plan to order/Initiate the following while awaiting opening in ED: labs and imaging studies.   Initiate Treatment-Testing, Proceed toTreatment Area When Bed Available for ED Attending/MLP to Continue Care    Electronically signed by Yvette Conn PA-C   DD: 12/11/23       Yvette Conn PA-C  12/11/23 0009

## 2023-12-12 LAB
EKG ATRIAL RATE: 95 BPM
EKG P AXIS: 75 DEGREES
EKG P-R INTERVAL: 172 MS
EKG Q-T INTERVAL: 406 MS
EKG QRS DURATION: 148 MS
EKG QTC CALCULATION (BAZETT): 510 MS
EKG R AXIS: -36 DEGREES
EKG T AXIS: 128 DEGREES
EKG VENTRICULAR RATE: 95 BPM

## 2023-12-12 PROCEDURE — 93010 ELECTROCARDIOGRAM REPORT: CPT | Performed by: INTERNAL MEDICINE

## 2023-12-12 NOTE — DISCHARGE INSTRUCTIONS
Return to ED if any worsening symptoms or alarming changes occur. Follow-up with cardiology regarding today's visit.

## 2023-12-13 ASSESSMENT — ENCOUNTER SYMPTOMS
TROUBLE SWALLOWING: 0
PHOTOPHOBIA: 0
NAUSEA: 0
VOMITING: 0
SHORTNESS OF BREATH: 1
VOICE CHANGE: 0
WHEEZING: 0
DIARRHEA: 0
ABDOMINAL PAIN: 0

## 2023-12-13 NOTE — ED PROVIDER NOTES
this note were completed with a voice recognition program.  Efforts were made to edit the dictations but occasionally words are mis-transcribed.)        Rg Granda DO  Resident  12/13/23 [id number]

## 2024-02-27 ENCOUNTER — APPOINTMENT (OUTPATIENT)
Dept: GENERAL RADIOLOGY | Age: 59
DRG: 291 | End: 2024-02-27
Payer: MEDICARE

## 2024-02-27 ENCOUNTER — APPOINTMENT (OUTPATIENT)
Dept: ULTRASOUND IMAGING | Age: 59
DRG: 291 | End: 2024-02-27
Payer: MEDICARE

## 2024-02-27 ENCOUNTER — APPOINTMENT (OUTPATIENT)
Dept: CT IMAGING | Age: 59
DRG: 291 | End: 2024-02-27
Payer: MEDICARE

## 2024-02-27 ENCOUNTER — HOSPITAL ENCOUNTER (INPATIENT)
Age: 59
LOS: 7 days | Discharge: HOME | DRG: 291 | End: 2024-03-05
Attending: EMERGENCY MEDICINE | Admitting: STUDENT IN AN ORGANIZED HEALTH CARE EDUCATION/TRAINING PROGRAM
Payer: MEDICARE

## 2024-02-27 DIAGNOSIS — I50.9 ACUTE DECOMPENSATED HEART FAILURE (HCC): Primary | ICD-10-CM

## 2024-02-27 DIAGNOSIS — N63.0 BREAST SWELLING: ICD-10-CM

## 2024-02-27 DIAGNOSIS — J90 LOCULATED PLEURAL EFFUSION: ICD-10-CM

## 2024-02-27 DIAGNOSIS — R79.89 TROPONIN LEVEL ELEVATED: ICD-10-CM

## 2024-02-27 PROBLEM — J44.9 CHRONIC OBSTRUCTIVE PULMONARY DISEASE (HCC): Status: ACTIVE | Noted: 2024-02-27

## 2024-02-27 PROBLEM — I44.30 ATRIOVENTRICULAR BLOCK: Status: ACTIVE | Noted: 2024-02-27

## 2024-02-27 PROBLEM — N18.2 STAGE 2 CHRONIC KIDNEY DISEASE: Status: ACTIVE | Noted: 2024-02-27

## 2024-02-27 PROBLEM — I05.9 MITRAL VALVE DISEASE: Status: ACTIVE | Noted: 2024-02-27

## 2024-02-27 PROBLEM — E66.9 MODERATE OBESITY: Status: ACTIVE | Noted: 2023-02-08

## 2024-02-27 PROBLEM — F10.90 ALCOHOL USE: Status: ACTIVE | Noted: 2024-02-27

## 2024-02-27 PROBLEM — Z78.9 ALCOHOL USE: Status: ACTIVE | Noted: 2024-02-27

## 2024-02-27 PROBLEM — I35.9 AORTIC VALVE DISEASE: Status: ACTIVE | Noted: 2024-02-27

## 2024-02-27 PROBLEM — E78.00 PURE HYPERCHOLESTEROLEMIA: Status: ACTIVE | Noted: 2024-02-27

## 2024-02-27 PROBLEM — E66.8 MODERATE OBESITY: Status: ACTIVE | Noted: 2023-02-08

## 2024-02-27 LAB
ALBUMIN SERPL-MCNC: 3.8 G/DL (ref 3.5–5.2)
ALP SERPL-CCNC: 207 U/L (ref 40–129)
ALT SERPL-CCNC: 22 U/L (ref 0–40)
ANION GAP SERPL CALCULATED.3IONS-SCNC: 11 MMOL/L (ref 7–16)
AST SERPL-CCNC: 33 U/L (ref 0–39)
BASOPHILS # BLD: 0.07 K/UL (ref 0–0.2)
BASOPHILS NFR BLD: 1 % (ref 0–2)
BILIRUB SERPL-MCNC: 2 MG/DL (ref 0–1.2)
BNP SERPL-MCNC: 2040 PG/ML (ref 0–125)
BUN SERPL-MCNC: 14 MG/DL (ref 6–20)
CALCIUM SERPL-MCNC: 8.4 MG/DL (ref 8.6–10.2)
CHLORIDE SERPL-SCNC: 104 MMOL/L (ref 98–107)
CO2 SERPL-SCNC: 22 MMOL/L (ref 22–29)
CREAT SERPL-MCNC: 1.6 MG/DL (ref 0.7–1.2)
EKG ATRIAL RATE: 94 BPM
EKG P AXIS: 104 DEGREES
EKG P-R INTERVAL: 196 MS
EKG Q-T INTERVAL: 434 MS
EKG QRS DURATION: 156 MS
EKG QTC CALCULATION (BAZETT): 542 MS
EKG R AXIS: -49 DEGREES
EKG T AXIS: 120 DEGREES
EKG VENTRICULAR RATE: 94 BPM
EOSINOPHIL # BLD: 0.09 K/UL (ref 0.05–0.5)
EOSINOPHILS RELATIVE PERCENT: 1 % (ref 0–6)
ERYTHROCYTE [DISTWIDTH] IN BLOOD BY AUTOMATED COUNT: 19.9 % (ref 11.5–15)
GFR SERPL CREATININE-BSD FRML MDRD: 49 ML/MIN/1.73M2
GLUCOSE SERPL-MCNC: 88 MG/DL (ref 74–99)
HCT VFR BLD AUTO: 35.7 % (ref 37–54)
HGB BLD-MCNC: 10.8 G/DL (ref 12.5–16.5)
IMM GRANULOCYTES # BLD AUTO: 0.03 K/UL (ref 0–0.58)
IMM GRANULOCYTES NFR BLD: 0 % (ref 0–5)
INFLUENZA A BY PCR: NOT DETECTED
INFLUENZA B BY PCR: NOT DETECTED
INR PPP: 3.6
LYMPHOCYTES NFR BLD: 0.98 K/UL (ref 1.5–4)
LYMPHOCYTES RELATIVE PERCENT: 13 % (ref 20–42)
MCH RBC QN AUTO: 25.7 PG (ref 26–35)
MCHC RBC AUTO-ENTMCNC: 30.3 G/DL (ref 32–34.5)
MCV RBC AUTO: 85 FL (ref 80–99.9)
MONOCYTES NFR BLD: 0.99 K/UL (ref 0.1–0.95)
MONOCYTES NFR BLD: 13 % (ref 2–12)
NEUTROPHILS NFR BLD: 72 % (ref 43–80)
NEUTS SEG NFR BLD: 5.46 K/UL (ref 1.8–7.3)
PLATELET, FLUORESCENCE: 147 K/UL (ref 130–450)
PMV BLD AUTO: 12.3 FL (ref 7–12)
POTASSIUM SERPL-SCNC: 3.5 MMOL/L (ref 3.5–5)
PROT SERPL-MCNC: 6.9 G/DL (ref 6.4–8.3)
PROTHROMBIN TIME: 39.9 SEC (ref 9.3–12.4)
RBC # BLD AUTO: 4.2 M/UL (ref 3.8–5.8)
RSV BY PCR: NOT DETECTED
SARS-COV-2 RDRP RESP QL NAA+PROBE: NOT DETECTED
SODIUM SERPL-SCNC: 137 MMOL/L (ref 132–146)
SPECIMEN DESCRIPTION: NORMAL
SPECIMEN SOURCE: NORMAL
TROPONIN I SERPL HS-MCNC: 51 NG/L (ref 0–11)
TROPONIN I SERPL HS-MCNC: 53 NG/L (ref 0–11)
WBC OTHER # BLD: 7.6 K/UL (ref 4.5–11.5)

## 2024-02-27 PROCEDURE — 71045 X-RAY EXAM CHEST 1 VIEW: CPT

## 2024-02-27 PROCEDURE — 93005 ELECTROCARDIOGRAM TRACING: CPT

## 2024-02-27 PROCEDURE — 80053 COMPREHEN METABOLIC PANEL: CPT

## 2024-02-27 PROCEDURE — 99223 1ST HOSP IP/OBS HIGH 75: CPT | Performed by: INTERNAL MEDICINE

## 2024-02-27 PROCEDURE — 87634 RSV DNA/RNA AMP PROBE: CPT

## 2024-02-27 PROCEDURE — 2580000003 HC RX 258: Performed by: INTERNAL MEDICINE

## 2024-02-27 PROCEDURE — 83880 ASSAY OF NATRIURETIC PEPTIDE: CPT

## 2024-02-27 PROCEDURE — 84484 ASSAY OF TROPONIN QUANT: CPT

## 2024-02-27 PROCEDURE — 93010 ELECTROCARDIOGRAM REPORT: CPT | Performed by: INTERNAL MEDICINE

## 2024-02-27 PROCEDURE — 6370000000 HC RX 637 (ALT 250 FOR IP): Performed by: NURSE PRACTITIONER

## 2024-02-27 PROCEDURE — 87635 SARS-COV-2 COVID-19 AMP PRB: CPT

## 2024-02-27 PROCEDURE — 96374 THER/PROPH/DIAG INJ IV PUSH: CPT

## 2024-02-27 PROCEDURE — 6360000002 HC RX W HCPCS: Performed by: EMERGENCY MEDICINE

## 2024-02-27 PROCEDURE — APPSS45 APP SPLIT SHARED TIME 31-45 MINUTES: Performed by: NURSE PRACTITIONER

## 2024-02-27 PROCEDURE — 99285 EMERGENCY DEPT VISIT HI MDM: CPT

## 2024-02-27 PROCEDURE — 71275 CT ANGIOGRAPHY CHEST: CPT

## 2024-02-27 PROCEDURE — 2060000000 HC ICU INTERMEDIATE R&B

## 2024-02-27 PROCEDURE — 6360000004 HC RX CONTRAST MEDICATION: Performed by: RADIOLOGY

## 2024-02-27 PROCEDURE — 85025 COMPLETE CBC W/AUTO DIFF WBC: CPT

## 2024-02-27 PROCEDURE — 6360000002 HC RX W HCPCS

## 2024-02-27 PROCEDURE — 93970 EXTREMITY STUDY: CPT

## 2024-02-27 PROCEDURE — 85610 PROTHROMBIN TIME: CPT

## 2024-02-27 PROCEDURE — 94640 AIRWAY INHALATION TREATMENT: CPT

## 2024-02-27 PROCEDURE — 96375 TX/PRO/DX INJ NEW DRUG ADDON: CPT

## 2024-02-27 PROCEDURE — 87502 INFLUENZA DNA AMP PROBE: CPT

## 2024-02-27 PROCEDURE — 6370000000 HC RX 637 (ALT 250 FOR IP): Performed by: EMERGENCY MEDICINE

## 2024-02-27 RX ORDER — DIPHENHYDRAMINE HCL 25 MG
50 TABLET ORAL ONCE
Status: COMPLETED | OUTPATIENT
Start: 2024-02-27 | End: 2024-02-27

## 2024-02-27 RX ORDER — POLYETHYLENE GLYCOL 3350 17 G/17G
17 POWDER, FOR SOLUTION ORAL DAILY PRN
Status: DISCONTINUED | OUTPATIENT
Start: 2024-02-27 | End: 2024-03-05 | Stop reason: HOSPADM

## 2024-02-27 RX ORDER — ACETAMINOPHEN 325 MG/1
650 TABLET ORAL EVERY 6 HOURS PRN
Status: DISCONTINUED | OUTPATIENT
Start: 2024-02-27 | End: 2024-03-05 | Stop reason: HOSPADM

## 2024-02-27 RX ORDER — IPRATROPIUM BROMIDE AND ALBUTEROL SULFATE 2.5; .5 MG/3ML; MG/3ML
1 SOLUTION RESPIRATORY (INHALATION)
Status: COMPLETED | OUTPATIENT
Start: 2024-02-27 | End: 2024-02-27

## 2024-02-27 RX ORDER — SODIUM CHLORIDE 0.9 % (FLUSH) 0.9 %
5-40 SYRINGE (ML) INJECTION EVERY 12 HOURS SCHEDULED
Status: DISCONTINUED | OUTPATIENT
Start: 2024-02-27 | End: 2024-03-05 | Stop reason: HOSPADM

## 2024-02-27 RX ORDER — FUROSEMIDE 10 MG/ML
80 INJECTION INTRAMUSCULAR; INTRAVENOUS ONCE
Status: COMPLETED | OUTPATIENT
Start: 2024-02-27 | End: 2024-02-27

## 2024-02-27 RX ORDER — PROCHLORPERAZINE EDISYLATE 5 MG/ML
10 INJECTION INTRAMUSCULAR; INTRAVENOUS EVERY 6 HOURS PRN
Status: DISCONTINUED | OUTPATIENT
Start: 2024-02-27 | End: 2024-03-05 | Stop reason: HOSPADM

## 2024-02-27 RX ORDER — IPRATROPIUM BROMIDE AND ALBUTEROL SULFATE 2.5; .5 MG/3ML; MG/3ML
1 SOLUTION RESPIRATORY (INHALATION) EVERY 6 HOURS PRN
Status: ON HOLD | COMMUNITY
End: 2024-03-05

## 2024-02-27 RX ORDER — DEXAMETHASONE SODIUM PHOSPHATE 10 MG/ML
10 INJECTION INTRAMUSCULAR; INTRAVENOUS ONCE
Status: COMPLETED | OUTPATIENT
Start: 2024-02-27 | End: 2024-02-27

## 2024-02-27 RX ORDER — ONDANSETRON 2 MG/ML
4 INJECTION INTRAMUSCULAR; INTRAVENOUS EVERY 6 HOURS PRN
Status: DISCONTINUED | OUTPATIENT
Start: 2024-02-27 | End: 2024-02-27 | Stop reason: CLARIF

## 2024-02-27 RX ORDER — ONDANSETRON 4 MG/1
4 TABLET, ORALLY DISINTEGRATING ORAL EVERY 8 HOURS PRN
Status: DISCONTINUED | OUTPATIENT
Start: 2024-02-27 | End: 2024-02-27 | Stop reason: CLARIF

## 2024-02-27 RX ORDER — WARFARIN SODIUM 2.5 MG/1
2.5 TABLET ORAL NIGHTLY
Status: ON HOLD | COMMUNITY
End: 2024-03-05

## 2024-02-27 RX ORDER — SODIUM CHLORIDE 9 MG/ML
INJECTION, SOLUTION INTRAVENOUS PRN
Status: DISCONTINUED | OUTPATIENT
Start: 2024-02-27 | End: 2024-03-05 | Stop reason: HOSPADM

## 2024-02-27 RX ORDER — PROCHLORPERAZINE MALEATE 10 MG
10 TABLET ORAL EVERY 8 HOURS PRN
Status: DISCONTINUED | OUTPATIENT
Start: 2024-02-27 | End: 2024-03-05 | Stop reason: HOSPADM

## 2024-02-27 RX ORDER — SODIUM CHLORIDE 0.9 % (FLUSH) 0.9 %
5-40 SYRINGE (ML) INJECTION PRN
Status: DISCONTINUED | OUTPATIENT
Start: 2024-02-27 | End: 2024-03-05 | Stop reason: HOSPADM

## 2024-02-27 RX ORDER — ACETAMINOPHEN 650 MG/1
650 SUPPOSITORY RECTAL EVERY 6 HOURS PRN
Status: DISCONTINUED | OUTPATIENT
Start: 2024-02-27 | End: 2024-03-05 | Stop reason: HOSPADM

## 2024-02-27 RX ADMIN — DIPHENHYDRAMINE HCL 50 MG: 25 TABLET ORAL at 23:22

## 2024-02-27 RX ADMIN — SODIUM CHLORIDE, PRESERVATIVE FREE 10 ML: 5 INJECTION INTRAVENOUS at 20:58

## 2024-02-27 RX ADMIN — IPRATROPIUM BROMIDE AND ALBUTEROL SULFATE 1 DOSE: 2.5; .5 SOLUTION RESPIRATORY (INHALATION) at 14:08

## 2024-02-27 RX ADMIN — DEXAMETHASONE SODIUM PHOSPHATE 10 MG: 10 INJECTION INTRAMUSCULAR; INTRAVENOUS at 13:35

## 2024-02-27 RX ADMIN — IPRATROPIUM BROMIDE AND ALBUTEROL SULFATE 1 DOSE: 2.5; .5 SOLUTION RESPIRATORY (INHALATION) at 14:10

## 2024-02-27 RX ADMIN — IPRATROPIUM BROMIDE AND ALBUTEROL SULFATE 1 DOSE: 2.5; .5 SOLUTION RESPIRATORY (INHALATION) at 14:09

## 2024-02-27 RX ADMIN — IOPAMIDOL 75 ML: 755 INJECTION, SOLUTION INTRAVENOUS at 12:58

## 2024-02-27 RX ADMIN — FUROSEMIDE 80 MG: 10 INJECTION, SOLUTION INTRAMUSCULAR; INTRAVENOUS at 14:42

## 2024-02-27 ASSESSMENT — PAIN DESCRIPTION - DESCRIPTORS
DESCRIPTORS: THROBBING
DESCRIPTORS: SORE
DESCRIPTORS: TENDER;ACHING

## 2024-02-27 ASSESSMENT — PAIN SCALES - GENERAL
PAINLEVEL_OUTOF10: 6
PAINLEVEL_OUTOF10: 7
PAINLEVEL_OUTOF10: 5

## 2024-02-27 ASSESSMENT — PAIN DESCRIPTION - LOCATION
LOCATION: CHEST

## 2024-02-27 ASSESSMENT — PAIN DESCRIPTION - ORIENTATION
ORIENTATION: LEFT

## 2024-02-27 ASSESSMENT — PAIN DESCRIPTION - ONSET
ONSET: ON-GOING
ONSET: ON-GOING

## 2024-02-27 ASSESSMENT — PAIN DESCRIPTION - FREQUENCY
FREQUENCY: INTERMITTENT
FREQUENCY: INTERMITTENT

## 2024-02-27 ASSESSMENT — PAIN DESCRIPTION - PAIN TYPE
TYPE: ACUTE PAIN
TYPE: ACUTE PAIN

## 2024-02-27 NOTE — ED PROVIDER NOTES
--------------------------------- IMPRESSION AND DISPOSITION ---------------------------------    IMPRESSION  1. Acute decompensated heart failure (HCC)    2. Loculated pleural effusion    3. Troponin level elevated        DISPOSITION  Disposition: Admit to telemetry  Patient condition is fair    NOTE: This report was transcribed using voice recognition software. Every effort was made to ensure accuracy; however, inadvertent computerized transcription errors may be present

## 2024-02-27 NOTE — H&P
[facility]   History and Physical      CHIEF COMPLAINT:  SOB/CP    History of Present Illness:  58 y.o. male with a history of alcoholic liver disease, mitral valve replacement, stroke, testicular cancer presents with increased SOB/CP. Pt states he has been experiencing increased SOB over the last month or so. States he is having difficulty sleeping. Notes orthopnea. States over the last couple of day it has significantly worsened. He lives in a 2 story home with his significant other. States restroom and bedroom are upstairs. Has difficulty walking even 10 feet without needing to rest and catch his breath States he has been consuming \"2 beers a night\" to help him rest.  is a current 1 pack a day smoker and has smoked for 36 years.Noted history of valve replacements at . He denies any fevers, chills, N/V/D. Pt received decadron 10mg IV, Lasix 80mg IV, Duoneb x3 in ED course. Decision to admit pt for further evaluation and treatment.     Informant(s) for H&P:Pt and EMR    REVIEW OF SYSTEMS:  Admits to Increased SOB, CP. Denies fevers, chills, n/v, ha, vision/hearing changes, wt changes, hot/cold flashes, other open skin lesions, diarrhea, constipation, dysuria/hematuria unless noted in HPI. Complete ROS performed with the patient and is otherwise negative.      PMH:  Past Medical History:   Diagnosis Date    Alcoholic liver disease (HCC)     H/O prosthetic aortic valve replacement 09/2020    [facility]  Mitral and pacemaker at this time as well    History of mitral valve replacement 09/2020    at [facility]  Aortic Valve placed at this time as well as pacemaker    Stroke (HCC)     Testicular cancer (HCC)     in remission since 1988       Surgical History:  Past Surgical History:   Procedure Laterality Date    COLONOSCOPY N/A 8/24/2022    COLONOSCOPY POLYPECTOMY HOT BIOPSY performed by Rene Lorenzo MD at [facility]COPY    FRACTURE SURGERY      Left Tibial

## 2024-02-27 NOTE — ED NOTES
ED to Inpatient Handoff Report    Notified Justin that electronic handoff available and patient ready for transport to room 428.    Safety Risks: Risk of falls    Patient in Restraints: no    Constant Observer or Patient : no    Telemetry Monitoring Ordered :Yes           Order to transfer to unit without monitor:YES    Last MEWS: 2 Time completed: 1600    Deterioration Index Score:   Predictive Model Details          29 (Normal)  Factor Value    Calculated 2/27/2024 16:32 38% Supplemental oxygen Nasal cannula    Deterioration Index Model 33% Age 58 years old     18% Respiratory rate 20     4% Systolic 134     3% Pulse 93     2% Potassium 3.5 mmol/L     1% Sodium 137 mmol/L     1% Hematocrit abnormal (35.7 %)     0% Temperature 97.9 °F (36.6 °C)     0% WBC count 7.6 k/uL     0% Pulse oximetry 96 %        Vitals:    02/27/24 1039 02/27/24 1100 02/27/24 1300 02/27/24 1600   BP: 135/82 (!) 130/113 (!) 136/90 (!) 134/93   Pulse: 97 93     Resp: 20 20     Temp: 97.8 °F (36.6 °C)  98 °F (36.7 °C) 97.9 °F (36.6 °C)   TempSrc: Oral   Oral   SpO2: 99% 96% 96% 96%   Weight: 108.9 kg (240 lb)      Height: 1.778 m (5' 10\")            Opportunity for questions and clarification was provided.

## 2024-02-28 ENCOUNTER — APPOINTMENT (OUTPATIENT)
Age: 59
DRG: 291 | End: 2024-02-28
Attending: INTERNAL MEDICINE
Payer: MEDICARE

## 2024-02-28 ENCOUNTER — APPOINTMENT (OUTPATIENT)
Dept: ULTRASOUND IMAGING | Age: 59
DRG: 291 | End: 2024-02-28
Payer: MEDICARE

## 2024-02-28 DIAGNOSIS — N63.0 BREAST SWELLING: Primary | ICD-10-CM

## 2024-02-28 DIAGNOSIS — N64.9 DISORDER OF BREAST, UNSPECIFIED: ICD-10-CM

## 2024-02-28 PROBLEM — E83.39 HYPOPHOSPHATEMIA: Status: ACTIVE | Noted: 2024-02-28

## 2024-02-28 PROBLEM — I50.9 ACUTE CONGESTIVE HEART FAILURE (HCC): Status: ACTIVE | Noted: 2024-02-28

## 2024-02-28 LAB
25(OH)D3 SERPL-MCNC: 14.5 NG/ML (ref 30–100)
ALBUMIN SERPL-MCNC: 3.9 G/DL (ref 3.5–5.2)
ALP SERPL-CCNC: 216 U/L (ref 40–129)
ALT SERPL-CCNC: 19 U/L (ref 0–40)
ANION GAP SERPL CALCULATED.3IONS-SCNC: 12 MMOL/L (ref 7–16)
AST SERPL-CCNC: 23 U/L (ref 0–39)
BASOPHILS # BLD: 0 K/UL (ref 0–0.2)
BASOPHILS NFR BLD: 0 % (ref 0–2)
BILIRUB SERPL-MCNC: 1.7 MG/DL (ref 0–1.2)
BUN SERPL-MCNC: 22 MG/DL (ref 6–20)
CALCIUM SERPL-MCNC: 9 MG/DL (ref 8.6–10.2)
CHLORIDE SERPL-SCNC: 101 MMOL/L (ref 98–107)
CO2 SERPL-SCNC: 20 MMOL/L (ref 22–29)
CREAT SERPL-MCNC: 1.8 MG/DL (ref 0.7–1.2)
CREAT UR-MCNC: 146.5 MG/DL (ref 40–278)
CREAT UR-MCNC: 149.3 MG/DL (ref 40–278)
ECHO AO ASC DIAM: 4.1 CM
ECHO AO ASCENDING AORTA INDEX: 1.78 CM/M2
ECHO AV MEAN GRADIENT: 6 MMHG
ECHO AV MEAN VELOCITY: 1.1 M/S
ECHO AV PEAK GRADIENT: 10 MMHG
ECHO AV PEAK VELOCITY: 1.6 M/S
ECHO AV VTI: 34.7 CM
ECHO BSA: 2.32 M2
ECHO LV EDV A2C: 118 ML
ECHO LV EDV A4C: 154 ML
ECHO LV EDV BP: 135 ML (ref 67–155)
ECHO LV EDV INDEX A4C: 67 ML/M2
ECHO LV EDV INDEX BP: 59 ML/M2
ECHO LV EDV NDEX A2C: 51 ML/M2
ECHO LV EJECTION FRACTION A2C: 40 %
ECHO LV EJECTION FRACTION A4C: 49 %
ECHO LV EJECTION FRACTION BIPLANE: 44 % (ref 55–100)
ECHO LV ESV A2C: 72 ML
ECHO LV ESV A4C: 78 ML
ECHO LV ESV BP: 76 ML (ref 22–58)
ECHO LV ESV INDEX A2C: 31 ML/M2
ECHO LV ESV INDEX A4C: 34 ML/M2
ECHO LV ESV INDEX BP: 33 ML/M2
ECHO MV AREA PHT: 3.3 CM2
ECHO MV MAX VELOCITY: 1.7 M/S
ECHO MV MEAN GRADIENT: 5 MMHG
ECHO MV MEAN VELOCITY: 1 M/S
ECHO MV PEAK GRADIENT: 12 MMHG
ECHO MV PRESSURE HALF TIME (PHT): 65.7 MS
ECHO MV VTI: 40.4 CM
ECHO PV MAX VELOCITY: 0.8 M/S
ECHO PV MEAN GRADIENT: 1 MMHG
ECHO PV MEAN VELOCITY: 0.5 M/S
ECHO PV PEAK GRADIENT: 2 MMHG
ECHO PV VTI: 13.2 CM
ECHO TV REGURGITANT MAX VELOCITY: 2.14 M/S
ECHO TV REGURGITANT PEAK GRADIENT: 18 MMHG
EOSINOPHIL # BLD: 0 K/UL (ref 0.05–0.5)
EOSINOPHILS RELATIVE PERCENT: 0 % (ref 0–6)
ERYTHROCYTE [DISTWIDTH] IN BLOOD BY AUTOMATED COUNT: 19.7 % (ref 11.5–15)
GFR SERPL CREATININE-BSD FRML MDRD: 45 ML/MIN/1.73M2
GLUCOSE SERPL-MCNC: 122 MG/DL (ref 74–99)
HCT VFR BLD AUTO: 36.3 % (ref 37–54)
HGB BLD-MCNC: 10.9 G/DL (ref 12.5–16.5)
INR PPP: 3.1
LACTATE BLDV-SCNC: 1.1 MMOL/L (ref 0.5–2.2)
LYMPHOCYTES NFR BLD: 0.29 K/UL (ref 1.5–4)
LYMPHOCYTES RELATIVE PERCENT: 5 % (ref 20–42)
MAGNESIUM SERPL-MCNC: 1.5 MG/DL (ref 1.6–2.6)
MCH RBC QN AUTO: 25.3 PG (ref 26–35)
MCHC RBC AUTO-ENTMCNC: 30 G/DL (ref 32–34.5)
MCV RBC AUTO: 84.4 FL (ref 80–99.9)
MICROALBUMIN UR-MCNC: 192 MG/L (ref 0–19)
MICROALBUMIN/CREAT UR-RTO: 131 MCG/MG CREAT (ref 0–30)
MONOCYTES NFR BLD: 0.12 K/UL (ref 0.1–0.95)
MONOCYTES NFR BLD: 2 % (ref 2–12)
NEUTROPHILS NFR BLD: 94 % (ref 43–80)
NEUTS SEG NFR BLD: 6.09 K/UL (ref 1.8–7.3)
NUCLEATED RED BLOOD CELLS: 1 PER 100 WBC
PHOSPHATE SERPL-MCNC: 2.1 MG/DL (ref 2.5–4.5)
PLATELET # BLD AUTO: 179 K/UL (ref 130–450)
PMV BLD AUTO: 12.1 FL (ref 7–12)
POTASSIUM SERPL-SCNC: 3.6 MMOL/L (ref 3.5–5)
PROT SERPL-MCNC: 7.1 G/DL (ref 6.4–8.3)
PROTHROMBIN TIME: 34.4 SEC (ref 9.3–12.4)
RBC # BLD AUTO: 4.3 M/UL (ref 3.8–5.8)
RBC # BLD: ABNORMAL 10*6/UL
SODIUM SERPL-SCNC: 133 MMOL/L (ref 132–146)
TOTAL PROTEIN, URINE: 41 MG/DL (ref 0–12)
URINE TOTAL PROTEIN CREATININE RATIO: 0.27 (ref 0–0.2)
WBC OTHER # BLD: 6.5 K/UL (ref 4.5–11.5)

## 2024-02-28 PROCEDURE — 76642 ULTRASOUND BREAST LIMITED: CPT

## 2024-02-28 PROCEDURE — 6370000000 HC RX 637 (ALT 250 FOR IP): Performed by: INTERNAL MEDICINE

## 2024-02-28 PROCEDURE — 80053 COMPREHEN METABOLIC PANEL: CPT

## 2024-02-28 PROCEDURE — 82306 VITAMIN D 25 HYDROXY: CPT

## 2024-02-28 PROCEDURE — 36415 COLL VENOUS BLD VENIPUNCTURE: CPT

## 2024-02-28 PROCEDURE — 82570 ASSAY OF URINE CREATININE: CPT

## 2024-02-28 PROCEDURE — 94640 AIRWAY INHALATION TREATMENT: CPT

## 2024-02-28 PROCEDURE — C8929 TTE W OR WO FOL WCON,DOPPLER: HCPCS

## 2024-02-28 PROCEDURE — 2500000003 HC RX 250 WO HCPCS

## 2024-02-28 PROCEDURE — 51798 US URINE CAPACITY MEASURE: CPT

## 2024-02-28 PROCEDURE — 82043 UR ALBUMIN QUANTITATIVE: CPT

## 2024-02-28 PROCEDURE — 93971 EXTREMITY STUDY: CPT

## 2024-02-28 PROCEDURE — 6370000000 HC RX 637 (ALT 250 FOR IP): Performed by: CLINICAL NURSE SPECIALIST

## 2024-02-28 PROCEDURE — 85025 COMPLETE CBC W/AUTO DIFF WBC: CPT

## 2024-02-28 PROCEDURE — 6360000002 HC RX W HCPCS

## 2024-02-28 PROCEDURE — 2060000000 HC ICU INTERMEDIATE R&B

## 2024-02-28 PROCEDURE — 6360000002 HC RX W HCPCS: Performed by: CLINICAL NURSE SPECIALIST

## 2024-02-28 PROCEDURE — 84100 ASSAY OF PHOSPHORUS: CPT

## 2024-02-28 PROCEDURE — 93306 TTE W/DOPPLER COMPLETE: CPT | Performed by: INTERNAL MEDICINE

## 2024-02-28 PROCEDURE — 99232 SBSQ HOSP IP/OBS MODERATE 35: CPT | Performed by: INTERNAL MEDICINE

## 2024-02-28 PROCEDURE — 2580000003 HC RX 258

## 2024-02-28 PROCEDURE — 85610 PROTHROMBIN TIME: CPT

## 2024-02-28 PROCEDURE — 2580000003 HC RX 258: Performed by: INTERNAL MEDICINE

## 2024-02-28 PROCEDURE — 6360000004 HC RX CONTRAST MEDICATION: Performed by: INTERNAL MEDICINE

## 2024-02-28 PROCEDURE — 83605 ASSAY OF LACTIC ACID: CPT

## 2024-02-28 PROCEDURE — 99233 SBSQ HOSP IP/OBS HIGH 50: CPT | Performed by: INTERNAL MEDICINE

## 2024-02-28 PROCEDURE — 83735 ASSAY OF MAGNESIUM: CPT

## 2024-02-28 PROCEDURE — 84156 ASSAY OF PROTEIN URINE: CPT

## 2024-02-28 PROCEDURE — 99222 1ST HOSP IP/OBS MODERATE 55: CPT | Performed by: SURGERY

## 2024-02-28 RX ORDER — WARFARIN SODIUM 2.5 MG/1
2.5 TABLET ORAL
Status: COMPLETED | OUTPATIENT
Start: 2024-02-28 | End: 2024-02-28

## 2024-02-28 RX ORDER — ARFORMOTEROL TARTRATE 15 UG/2ML
15 SOLUTION RESPIRATORY (INHALATION)
Status: DISCONTINUED | OUTPATIENT
Start: 2024-02-28 | End: 2024-03-05 | Stop reason: HOSPADM

## 2024-02-28 RX ORDER — ATORVASTATIN CALCIUM 20 MG/1
20 TABLET, FILM COATED ORAL NIGHTLY
Status: DISCONTINUED | OUTPATIENT
Start: 2024-02-28 | End: 2024-03-05 | Stop reason: HOSPADM

## 2024-02-28 RX ORDER — IPRATROPIUM BROMIDE AND ALBUTEROL SULFATE 2.5; .5 MG/3ML; MG/3ML
1 SOLUTION RESPIRATORY (INHALATION)
Status: DISCONTINUED | OUTPATIENT
Start: 2024-02-28 | End: 2024-03-05 | Stop reason: HOSPADM

## 2024-02-28 RX ORDER — ASPIRIN 81 MG/1
81 TABLET, CHEWABLE ORAL DAILY
Status: DISCONTINUED | OUTPATIENT
Start: 2024-02-28 | End: 2024-03-05 | Stop reason: HOSPADM

## 2024-02-28 RX ORDER — METOPROLOL SUCCINATE 25 MG/1
25 TABLET, EXTENDED RELEASE ORAL DAILY
Status: DISCONTINUED | OUTPATIENT
Start: 2024-02-28 | End: 2024-03-04

## 2024-02-28 RX ORDER — MAGNESIUM SULFATE IN WATER 40 MG/ML
2000 INJECTION, SOLUTION INTRAVENOUS ONCE
Status: COMPLETED | OUTPATIENT
Start: 2024-02-28 | End: 2024-02-28

## 2024-02-28 RX ORDER — HALOPERIDOL 0.5 MG/1
0.5 TABLET ORAL NIGHTLY PRN
Status: DISCONTINUED | OUTPATIENT
Start: 2024-02-28 | End: 2024-03-05 | Stop reason: HOSPADM

## 2024-02-28 RX ADMIN — ARFORMOTEROL TARTRATE 15 MCG: 15 SOLUTION RESPIRATORY (INHALATION) at 21:01

## 2024-02-28 RX ADMIN — ASPIRIN 81 MG CHEWABLE TABLET 81 MG: 81 TABLET CHEWABLE at 08:28

## 2024-02-28 RX ADMIN — ARFORMOTEROL TARTRATE 15 MCG: 15 SOLUTION RESPIRATORY (INHALATION) at 13:07

## 2024-02-28 RX ADMIN — IPRATROPIUM BROMIDE AND ALBUTEROL SULFATE 1 DOSE: 2.5; .5 SOLUTION RESPIRATORY (INHALATION) at 21:01

## 2024-02-28 RX ADMIN — SODIUM CHLORIDE, PRESERVATIVE FREE 10 ML: 5 INJECTION INTRAVENOUS at 20:59

## 2024-02-28 RX ADMIN — SODIUM CHLORIDE, PRESERVATIVE FREE 10 ML: 5 INJECTION INTRAVENOUS at 16:00

## 2024-02-28 RX ADMIN — IPRATROPIUM BROMIDE AND ALBUTEROL SULFATE 1 DOSE: 2.5; .5 SOLUTION RESPIRATORY (INHALATION) at 16:23

## 2024-02-28 RX ADMIN — PERFLUTREN 1.5 ML: 6.52 INJECTION, SUSPENSION INTRAVENOUS at 10:53

## 2024-02-28 RX ADMIN — SODIUM PHOSPHATE, MONOBASIC, MONOHYDRATE AND SODIUM PHOSPHATE, DIBASIC, ANHYDROUS 10 MMOL: 142; 276 INJECTION, SOLUTION INTRAVENOUS at 16:42

## 2024-02-28 RX ADMIN — BUMETANIDE 0.5 MG/HR: 0.25 INJECTION INTRAMUSCULAR; INTRAVENOUS at 16:03

## 2024-02-28 RX ADMIN — ATORVASTATIN CALCIUM 20 MG: 20 TABLET, FILM COATED ORAL at 20:59

## 2024-02-28 RX ADMIN — MAGNESIUM SULFATE HEPTAHYDRATE 2000 MG: 40 INJECTION, SOLUTION INTRAVENOUS at 14:33

## 2024-02-28 RX ADMIN — SODIUM CHLORIDE, PRESERVATIVE FREE 10 ML: 5 INJECTION INTRAVENOUS at 08:28

## 2024-02-28 RX ADMIN — METOPROLOL SUCCINATE 25 MG: 25 TABLET, FILM COATED, EXTENDED RELEASE ORAL at 08:28

## 2024-02-28 RX ADMIN — HALOPERIDOL 0.5 MG: 0.5 TABLET ORAL at 20:59

## 2024-02-28 RX ADMIN — WARFARIN SODIUM 2.5 MG: 2.5 TABLET ORAL at 18:07

## 2024-02-28 ASSESSMENT — PAIN SCALES - GENERAL
PAINLEVEL_OUTOF10: 4
PAINLEVEL_OUTOF10: 3
PAINLEVEL_OUTOF10: 6

## 2024-02-28 ASSESSMENT — PAIN DESCRIPTION - FREQUENCY
FREQUENCY: INTERMITTENT

## 2024-02-28 ASSESSMENT — PAIN DESCRIPTION - LOCATION
LOCATION: BREAST
LOCATION: CHEST
LOCATION: BREAST

## 2024-02-28 ASSESSMENT — PAIN DESCRIPTION - ORIENTATION
ORIENTATION: LEFT

## 2024-02-28 ASSESSMENT — PAIN DESCRIPTION - DESCRIPTORS
DESCRIPTORS: SORE;THROBBING
DESCRIPTORS: DISCOMFORT
DESCRIPTORS: SORE;THROBBING

## 2024-02-28 ASSESSMENT — PAIN - FUNCTIONAL ASSESSMENT: PAIN_FUNCTIONAL_ASSESSMENT: ACTIVITIES ARE NOT PREVENTED

## 2024-02-28 ASSESSMENT — PAIN DESCRIPTION - PAIN TYPE
TYPE: ACUTE PAIN

## 2024-02-28 ASSESSMENT — PAIN DESCRIPTION - ONSET
ONSET: ON-GOING
ONSET: ON-GOING

## 2024-02-28 NOTE — CONSULTS
[facility]   Inpatient CHF Nurse Navigator Consult      Cardiologist: Dr Wes Dorsey is a 58 y.o. (1965) male with a history of HFpEF, most recent EF:  Lab Results   Component Value Date    LVEF 50 07/17/2023       Patient was awake and alert, laying in bed during the consultation and is agreeable to heart failure education. He was engaged and asked appropriate questions throughout the education session.     Barriers identified during consult contributing to HF Hospitalization:  [] Limited medication adherence   [] Poor health literacy, education regarding HF medications provided   [] Pill box provided to patient  [] Difficulty affording medications  [] Difficulty obtaining/ managing medications  [] Prescription assistance information given     [] Not weighing themselves daily  [x] Weight log provided for easy monitoring  [] Scale provided     [] Not following low sodium diet  [] Food insecurity   [x] 2 gram sodium diet education provided   [] Low sodium recipes provided  [] Sodium free seasoning provided   [] Low sodium meal delivery options given to patient  [] Dietician consulted     [] Lack of transportation to appointments     [] Depression, given chronic illness  [] Primary team notified     [] Goals of care need addressed  [] Palliative care consulted     [] CHF CHW consulted, to assist with         Chart Reviewed:  Diet: ADULT DIET; Regular; Low Sodium (2 gm)   Daily Weights: Patient Vitals for the past 96 hrs (Last 3 readings):   Weight   02/28/24 0002 114.5 kg (252 lb 8 oz)   02/27/24 1039 108.9 kg (240 lb)     I/O:   Intake/Output Summary (Last 24 hours) at 2/28/2024 1249  Last data filed at 2/28/2024 0821  Gross per 24 hour   Intake 1080 ml   Output 1050 ml   Net 30 ml       [] Nursing staff/manager notified of inaccurate garrison weights or I/O        Discharge Plan:  Above identified barriers reviewed and needs addressed    Patient/family educated 
GENERAL SURGERY  CONSULT NOTE  2/28/2024    Physician Consulted: Dr. Kelly  Reason for Consult: breast swelling  Referring Physician: Dr. Kary VELASCO  Len Dorsey is a 58 y.o. male who presents to general surgery service for evaluation of breast swelling. He states that he has noticed his left breast to be bigger and he first noticed it the past 3 days. He denies any nipple discharge but states that it does feel sore when it is palpated. Pt states he initially presented for issues breathing. Pt has history of alcoholic liver disease, prosthetic aortic valve replacement on warfarin, mitral valve replacement, stroke, testicular cancer, who presented to the ED with alcohol withdrawals. Patient had EGD/Colonoscopy on 8/22/22 after he presented with supratherapeutic INR and concerns for GIB, which demonstrated duodenitis, several polyps, and large internal hemorrhoids     Past Medical History:   Diagnosis Date    Alcoholic liver disease (HCC)     H/O prosthetic aortic valve replacement 09/2020    [facility]  Mitral and pacemaker at this time as well    History of mitral valve replacement 09/2020    at [facility]  Aortic Valve placed at this time as well as pacemaker    Stroke (HCC)     Testicular cancer (HCC)     in remission since 1988       Past Surgical History:   Procedure Laterality Date    COLONOSCOPY N/A 8/24/2022    COLONOSCOPY POLYPECTOMY HOT BIOPSY performed by Rene Lorenzo MD at [facility]OSCOPY    FRACTURE SURGERY      Left Tibial Plateau Fracture 3/29/2022     PACEMAKER PLACEMENT  09/2020    UPPER GASTROINTESTINAL ENDOSCOPY N/A 8/24/2022    EGD DIAGNOSTIC ONLY performed by Rene Lorenzo MD at [facility] ENDOSCOPY       Medications Prior to Admission:    Prior to Admission medications    Medication Sig Start Date End Date Taking? Authorizing Provider   ipratropium 0.5 mg-albuterol 2.5 mg (DUONEB) 0.5-2.5 (3) MG/3ML SOLN nebulizer solution Inhale 3 mLs into the lungs every 6 hours as needed for 
wall soft tissue edema and loculated left  pleural effusion which may be on the basis of venous obstruction.  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  Further correlation with left upper extremity deep venous ultrasound could be  obtained.     4.  Small ascites.  Cholelithiasis, unchanged.    IMPRESSION/RECOMMENDATIONS:      Briefly Mr. Dorsey is a 58-year-old man with history of CKD stage IIIa, with large proteinuria, probably due to nephrosclerosis, baseline creatinine 1.5-1.7 mg/dL, HTN, CAD status post CABG, HFpEF 55%, valvular heart disease status post AVR and MVR both mechanical valves, post pacemaker placement, hyperlipidemia, CVA, testicular cancer status postchemotherapy, alcohol abuse, fatty liver, who was admitted on 2/27/2024 for shortness of breath which she states has been going on for nearly a month.  His proBNP was 2040 and his chest x-ray showed cardiomegaly with pulmonary vascular congestion and possible small bilateral pleural effusions.  Creatinine on admission 1.6 mg/dL has increased to 1.8 mg/dL, reason for this consultation.    FRANTZ stage I hemodynamically mediated 2/2 acute decompensated heart failure, baseline creatinine 1.5-1.7 mg/dL, creatinine 1.8 mg/dL, to start Bumex drip    CKD stage IIIa, probably due to nephrosclerosis, baseline creatinine 1.5-1.7 mg/dL, obtain urine PCR UACR    HFpEF 55%, proBNP 2040  Hypomagnesemia, 2/2 alcohol abuse, diuretics, to replace  Hypophosphatemia 2/2 alcohol abuse, to replace, obtain vitamin D 25 level  Normocytic anemia, ferritin 46, iron saturation 36%  -------------------------------------------  Alcohol abuse  CAD status post CABG  S/p AVR and MVR, both mechanical valves on heparin  Fatty liver  Left breast mass, ultrasound negative, general surgery following  Left pleural effusion, pulmonology following  History of testicular cancer  History of CVA    Plan:    Bumex drip 0.5 mg/hr  Replace 
osseous abnormality.  Median sternotomy. Soft tissues: Soft tissue inflammatory edema of the left chest wall and left breast and without focal lesion, soft tissue gas, or abscess.  As seen in the coronal projection, the left brachiocephalic vein is small in caliber and occluded and there is likely left subclavian vein occlusion, as well. Occlusion of the left brachiocephalic vein was also present 1 year earlier but left chest wall edema was not present at that time.     1.  No PE. 2.  Multi chamber cardiac enlargement.  Dilated pulmonary artery trunk and with IVC dilatation and hepatic vein reflux of contrast in keeping with right heart failure/cor pulmonale. 3.  Left breast and left chest wall soft tissue edema and loculated left pleural effusion which may be on the basis of venous obstruction.  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 Further correlation with left upper extremity deep venous ultrasound could be obtained. 4.  Small ascites.  Cholelithiasis, unchanged.       XR CHEST PORTABLE    Result Date: 2/27/2024  EXAMINATION: ONE XRAY VIEW OF THE CHEST 2/27/2024 12:09 pm COMPARISON: None. HISTORY: ORDERING SYSTEM PROVIDED HISTORY: SOB TECHNOLOGIST PROVIDED HISTORY: Reason for exam:->SOB FINDINGS: The heart is enlarged.  Pulmonary vessels are congested.  No pneumothorax. Mild blunting of the costophrenic angles.     Cardiomegaly with pulmonary vascular congestion. Possible small bilateral pleural effusions.       Vascular duplex lower extremity venous bilateral    Result Date: 2/27/2024  EXAMINATION: DUPLEX VENOUS ULTRASOUND OF THE BILATERAL LOWER EXTREMITIES2/27/2024 11:40 am TECHNIQUE: Duplex ultrasound using B-mode/gray scaled imaging and Doppler spectral analysis and color flow was obtained of the deep venous structures of the bilateral extremities. COMPARISON: None. HISTORY: ORDERING SYSTEM PROVIDED HISTORY: Edema right entire leg FINDINGS: The visualized veins of 
absence of additional arrhythmias contributing to his decompensation.  Following assessment of these issues, further optimization of medical therapy will be necessary.  Ongoing careful monitoring of anticoagulation will be necessitated with recommendations of maintenance of prothrombin times in the range of 2.0-2.5 times INR control as well as the addition of low-dose aspirin for atheroembolic protection in the face of his existing coronary atherosclerosis.  Appropriate lifestyle modification related to that of smoking cessation will be necessary to reduce risk of further adverse effects on chronic obstructive lung disease as well as that of his atherosclerotic risk in addition to cessation of ethanol consumption to reduce risk of adverse health effects.  Weight reduction will benefit diastolic cardiac performance as well as assisting to reduce risk of the development of obstructive sleep apnea with this further adverse cardiovascular effects.  Ongoing aggressive risk factor modification of blood pressure and serum lipids will remain essential to reducing risk of future atherosclerotic development in addition to that of appropriate antibiotic prophylaxis at time of all dental interventions to reduce risk of bacterial endocarditis.    Thank you for allowing me to participate in your patient's care. Please feel free to contact me if you have any questions or concerns.    Note: This report was completed using computerized voice recognition software. Every effort has been made to ensure accuracy, however; inadvertent computerized transcription errors may be present.    Florentino Lyles MD  [facility]

## 2024-02-29 PROBLEM — N17.9 AKI (ACUTE KIDNEY INJURY) (HCC): Status: ACTIVE | Noted: 2024-02-29

## 2024-02-29 PROBLEM — E87.20 ACIDOSIS: Status: ACTIVE | Noted: 2024-02-29

## 2024-02-29 LAB
ANION GAP SERPL CALCULATED.3IONS-SCNC: 16 MMOL/L (ref 7–16)
ANION GAP SERPL CALCULATED.3IONS-SCNC: 17 MMOL/L (ref 7–16)
BUN SERPL-MCNC: 41 MG/DL (ref 6–20)
BUN SERPL-MCNC: 47 MG/DL (ref 6–20)
CALCIUM SERPL-MCNC: 8.8 MG/DL (ref 8.6–10.2)
CALCIUM SERPL-MCNC: 9.2 MG/DL (ref 8.6–10.2)
CHLORIDE SERPL-SCNC: 100 MMOL/L (ref 98–107)
CHLORIDE SERPL-SCNC: 98 MMOL/L (ref 98–107)
CO2 SERPL-SCNC: 19 MMOL/L (ref 22–29)
CO2 SERPL-SCNC: 20 MMOL/L (ref 22–29)
CREAT SERPL-MCNC: 2.1 MG/DL (ref 0.7–1.2)
CREAT SERPL-MCNC: 2.3 MG/DL (ref 0.7–1.2)
GFR SERPL CREATININE-BSD FRML MDRD: 33 ML/MIN/1.73M2
GFR SERPL CREATININE-BSD FRML MDRD: 35 ML/MIN/1.73M2
GLUCOSE SERPL-MCNC: 106 MG/DL (ref 74–99)
GLUCOSE SERPL-MCNC: 84 MG/DL (ref 74–99)
INR PPP: 2.3
MAGNESIUM SERPL-MCNC: 1.9 MG/DL (ref 1.6–2.6)
PHOSPHATE SERPL-MCNC: 4.4 MG/DL (ref 2.5–4.5)
POTASSIUM SERPL-SCNC: 3.5 MMOL/L (ref 3.5–5)
POTASSIUM SERPL-SCNC: 3.6 MMOL/L (ref 3.5–5)
PROTHROMBIN TIME: 25.5 SEC (ref 9.3–12.4)
SODIUM SERPL-SCNC: 134 MMOL/L (ref 132–146)
SODIUM SERPL-SCNC: 136 MMOL/L (ref 132–146)

## 2024-02-29 PROCEDURE — 85610 PROTHROMBIN TIME: CPT

## 2024-02-29 PROCEDURE — 84100 ASSAY OF PHOSPHORUS: CPT

## 2024-02-29 PROCEDURE — 6370000000 HC RX 637 (ALT 250 FOR IP)

## 2024-02-29 PROCEDURE — 80048 BASIC METABOLIC PNL TOTAL CA: CPT

## 2024-02-29 PROCEDURE — 2580000003 HC RX 258: Performed by: INTERNAL MEDICINE

## 2024-02-29 PROCEDURE — 2580000003 HC RX 258

## 2024-02-29 PROCEDURE — 99233 SBSQ HOSP IP/OBS HIGH 50: CPT | Performed by: INTERNAL MEDICINE

## 2024-02-29 PROCEDURE — 6370000000 HC RX 637 (ALT 250 FOR IP): Performed by: INTERNAL MEDICINE

## 2024-02-29 PROCEDURE — 6370000000 HC RX 637 (ALT 250 FOR IP): Performed by: CLINICAL NURSE SPECIALIST

## 2024-02-29 PROCEDURE — 2060000000 HC ICU INTERMEDIATE R&B

## 2024-02-29 PROCEDURE — 6360000002 HC RX W HCPCS

## 2024-02-29 PROCEDURE — 94640 AIRWAY INHALATION TREATMENT: CPT

## 2024-02-29 PROCEDURE — 99232 SBSQ HOSP IP/OBS MODERATE 35: CPT | Performed by: INTERNAL MEDICINE

## 2024-02-29 PROCEDURE — 51798 US URINE CAPACITY MEASURE: CPT

## 2024-02-29 PROCEDURE — 36415 COLL VENOUS BLD VENIPUNCTURE: CPT

## 2024-02-29 PROCEDURE — 6360000002 HC RX W HCPCS: Performed by: CLINICAL NURSE SPECIALIST

## 2024-02-29 PROCEDURE — 83735 ASSAY OF MAGNESIUM: CPT

## 2024-02-29 RX ORDER — WARFARIN SODIUM 5 MG/1
5 TABLET ORAL
Status: COMPLETED | OUTPATIENT
Start: 2024-02-29 | End: 2024-02-29

## 2024-02-29 RX ORDER — VITAMIN B COMPLEX
1000 TABLET ORAL DAILY
Status: DISCONTINUED | OUTPATIENT
Start: 2024-02-29 | End: 2024-03-05 | Stop reason: HOSPADM

## 2024-02-29 RX ADMIN — WARFARIN SODIUM 5 MG: 5 TABLET ORAL at 12:40

## 2024-02-29 RX ADMIN — ASPIRIN 81 MG CHEWABLE TABLET 81 MG: 81 TABLET CHEWABLE at 08:15

## 2024-02-29 RX ADMIN — HALOPERIDOL 0.5 MG: 0.5 TABLET ORAL at 20:28

## 2024-02-29 RX ADMIN — SODIUM CHLORIDE, PRESERVATIVE FREE 10 ML: 5 INJECTION INTRAVENOUS at 08:15

## 2024-02-29 RX ADMIN — IPRATROPIUM BROMIDE AND ALBUTEROL SULFATE 1 DOSE: 2.5; .5 SOLUTION RESPIRATORY (INHALATION) at 12:18

## 2024-02-29 RX ADMIN — IPRATROPIUM BROMIDE AND ALBUTEROL SULFATE 1 DOSE: 2.5; .5 SOLUTION RESPIRATORY (INHALATION) at 15:35

## 2024-02-29 RX ADMIN — IPRATROPIUM BROMIDE AND ALBUTEROL SULFATE 1 DOSE: 2.5; .5 SOLUTION RESPIRATORY (INHALATION) at 08:01

## 2024-02-29 RX ADMIN — BUMETANIDE 0.5 MG/HR: 0.25 INJECTION INTRAMUSCULAR; INTRAVENOUS at 10:24

## 2024-02-29 RX ADMIN — METOPROLOL SUCCINATE 25 MG: 25 TABLET, FILM COATED, EXTENDED RELEASE ORAL at 08:15

## 2024-02-29 RX ADMIN — Medication 1000 UNITS: at 14:56

## 2024-02-29 RX ADMIN — ARFORMOTEROL TARTRATE 15 MCG: 15 SOLUTION RESPIRATORY (INHALATION) at 08:01

## 2024-02-29 RX ADMIN — IPRATROPIUM BROMIDE AND ALBUTEROL SULFATE 1 DOSE: 2.5; .5 SOLUTION RESPIRATORY (INHALATION) at 19:27

## 2024-02-29 RX ADMIN — ATORVASTATIN CALCIUM 20 MG: 20 TABLET, FILM COATED ORAL at 20:28

## 2024-02-29 RX ADMIN — ARFORMOTEROL TARTRATE 15 MCG: 15 SOLUTION RESPIRATORY (INHALATION) at 19:27

## 2024-02-29 RX ADMIN — SODIUM CHLORIDE, PRESERVATIVE FREE 10 ML: 5 INJECTION INTRAVENOUS at 20:28

## 2024-02-29 ASSESSMENT — PAIN DESCRIPTION - PAIN TYPE
TYPE: ACUTE PAIN

## 2024-02-29 ASSESSMENT — PAIN DESCRIPTION - DESCRIPTORS
DESCRIPTORS: DISCOMFORT

## 2024-02-29 ASSESSMENT — PAIN DESCRIPTION - ORIENTATION
ORIENTATION: LEFT
ORIENTATION: LEFT

## 2024-02-29 ASSESSMENT — PAIN DESCRIPTION - FREQUENCY
FREQUENCY: CONTINUOUS
FREQUENCY: INTERMITTENT
FREQUENCY: CONTINUOUS

## 2024-02-29 ASSESSMENT — PAIN SCALES - GENERAL
PAINLEVEL_OUTOF10: 6
PAINLEVEL_OUTOF10: 5
PAINLEVEL_OUTOF10: 4

## 2024-02-29 ASSESSMENT — PAIN DESCRIPTION - LOCATION
LOCATION: BREAST

## 2024-02-29 ASSESSMENT — PAIN DESCRIPTION - ONSET
ONSET: ON-GOING

## 2024-02-29 ASSESSMENT — PAIN - FUNCTIONAL ASSESSMENT
PAIN_FUNCTIONAL_ASSESSMENT: PREVENTS OR INTERFERES SOME ACTIVE ACTIVITIES AND ADLS
PAIN_FUNCTIONAL_ASSESSMENT: ACTIVITIES ARE NOT PREVENTED
PAIN_FUNCTIONAL_ASSESSMENT: ACTIVITIES ARE NOT PREVENTED

## 2024-02-29 NOTE — CARE COORDINATION
CASE MANAGEMENT..... Met with patient at the bedside. He voices being independent from home with his Leonie lynn. He has nebulizer and no other dme. History at [facility] and [facility] out of town. He is now on iv bumex gtt, aerosols and is tolerating room air. Was on coumadin pta. Cardio/Renal and CHF nurse following. Discharge plan is home. Anticipating no needs. Will follow.

## 2024-03-01 ENCOUNTER — APPOINTMENT (OUTPATIENT)
Dept: GENERAL RADIOLOGY | Age: 59
DRG: 291 | End: 2024-03-01
Payer: MEDICARE

## 2024-03-01 PROBLEM — R73.9 HYPERGLYCEMIA: Status: ACTIVE | Noted: 2024-03-01

## 2024-03-01 LAB
ANION GAP SERPL CALCULATED.3IONS-SCNC: 14 MMOL/L (ref 7–16)
ANION GAP SERPL CALCULATED.3IONS-SCNC: 17 MMOL/L (ref 7–16)
BNP SERPL-MCNC: 2186 PG/ML (ref 0–125)
BUN SERPL-MCNC: 48 MG/DL (ref 6–20)
BUN SERPL-MCNC: 53 MG/DL (ref 6–20)
CALCIUM SERPL-MCNC: 8.6 MG/DL (ref 8.6–10.2)
CALCIUM SERPL-MCNC: 9 MG/DL (ref 8.6–10.2)
CHLORIDE SERPL-SCNC: 99 MMOL/L (ref 98–107)
CHLORIDE SERPL-SCNC: 99 MMOL/L (ref 98–107)
CO2 SERPL-SCNC: 22 MMOL/L (ref 22–29)
CO2 SERPL-SCNC: 23 MMOL/L (ref 22–29)
CREAT SERPL-MCNC: 2.3 MG/DL (ref 0.7–1.2)
CREAT SERPL-MCNC: 2.4 MG/DL (ref 0.7–1.2)
GFR SERPL CREATININE-BSD FRML MDRD: 31 ML/MIN/1.73M2
GFR SERPL CREATININE-BSD FRML MDRD: 32 ML/MIN/1.73M2
GLUCOSE SERPL-MCNC: 112 MG/DL (ref 74–99)
GLUCOSE SERPL-MCNC: 98 MG/DL (ref 74–99)
INR PPP: 2.1
POTASSIUM SERPL-SCNC: 3.3 MMOL/L (ref 3.5–5)
POTASSIUM SERPL-SCNC: 3.6 MMOL/L (ref 3.5–5)
PROTHROMBIN TIME: 24.1 SEC (ref 9.3–12.4)
SODIUM SERPL-SCNC: 135 MMOL/L (ref 132–146)
SODIUM SERPL-SCNC: 139 MMOL/L (ref 132–146)

## 2024-03-01 PROCEDURE — 6370000000 HC RX 637 (ALT 250 FOR IP): Performed by: INTERNAL MEDICINE

## 2024-03-01 PROCEDURE — 2060000000 HC ICU INTERMEDIATE R&B

## 2024-03-01 PROCEDURE — 94640 AIRWAY INHALATION TREATMENT: CPT

## 2024-03-01 PROCEDURE — 6360000002 HC RX W HCPCS

## 2024-03-01 PROCEDURE — 85610 PROTHROMBIN TIME: CPT

## 2024-03-01 PROCEDURE — 6360000002 HC RX W HCPCS: Performed by: INTERNAL MEDICINE

## 2024-03-01 PROCEDURE — 2580000003 HC RX 258: Performed by: INTERNAL MEDICINE

## 2024-03-01 PROCEDURE — 80048 BASIC METABOLIC PNL TOTAL CA: CPT

## 2024-03-01 PROCEDURE — 6370000000 HC RX 637 (ALT 250 FOR IP)

## 2024-03-01 PROCEDURE — 6360000002 HC RX W HCPCS: Performed by: CLINICAL NURSE SPECIALIST

## 2024-03-01 PROCEDURE — 6370000000 HC RX 637 (ALT 250 FOR IP): Performed by: CLINICAL NURSE SPECIALIST

## 2024-03-01 PROCEDURE — 83880 ASSAY OF NATRIURETIC PEPTIDE: CPT

## 2024-03-01 PROCEDURE — 2580000003 HC RX 258

## 2024-03-01 PROCEDURE — 99232 SBSQ HOSP IP/OBS MODERATE 35: CPT | Performed by: INTERNAL MEDICINE

## 2024-03-01 PROCEDURE — 99233 SBSQ HOSP IP/OBS HIGH 50: CPT | Performed by: INTERNAL MEDICINE

## 2024-03-01 PROCEDURE — 71046 X-RAY EXAM CHEST 2 VIEWS: CPT

## 2024-03-01 RX ORDER — BUMETANIDE 1 MG/1
2 TABLET ORAL 2 TIMES DAILY
Status: DISCONTINUED | OUTPATIENT
Start: 2024-03-02 | End: 2024-03-05

## 2024-03-01 RX ORDER — SPIRONOLACTONE 25 MG/1
25 TABLET ORAL DAILY
Status: DISCONTINUED | OUTPATIENT
Start: 2024-03-01 | End: 2024-03-05 | Stop reason: HOSPADM

## 2024-03-01 RX ORDER — WARFARIN SODIUM 5 MG/1
5 TABLET ORAL
Status: COMPLETED | OUTPATIENT
Start: 2024-03-01 | End: 2024-03-01

## 2024-03-01 RX ORDER — POTASSIUM CHLORIDE 20 MEQ/1
40 TABLET, EXTENDED RELEASE ORAL ONCE
Status: COMPLETED | OUTPATIENT
Start: 2024-03-01 | End: 2024-03-01

## 2024-03-01 RX ORDER — ENOXAPARIN SODIUM 150 MG/ML
1 INJECTION SUBCUTANEOUS 2 TIMES DAILY
Status: DISCONTINUED | OUTPATIENT
Start: 2024-03-01 | End: 2024-03-03

## 2024-03-01 RX ADMIN — SODIUM CHLORIDE, PRESERVATIVE FREE 10 ML: 5 INJECTION INTRAVENOUS at 21:24

## 2024-03-01 RX ADMIN — SODIUM CHLORIDE, PRESERVATIVE FREE 10 ML: 5 INJECTION INTRAVENOUS at 09:19

## 2024-03-01 RX ADMIN — IPRATROPIUM BROMIDE AND ALBUTEROL SULFATE 1 DOSE: 2.5; .5 SOLUTION RESPIRATORY (INHALATION) at 20:13

## 2024-03-01 RX ADMIN — IPRATROPIUM BROMIDE AND ALBUTEROL SULFATE 1 DOSE: 2.5; .5 SOLUTION RESPIRATORY (INHALATION) at 12:15

## 2024-03-01 RX ADMIN — BUMETANIDE 0.5 MG/HR: 0.25 INJECTION INTRAMUSCULAR; INTRAVENOUS at 11:41

## 2024-03-01 RX ADMIN — Medication 1000 UNITS: at 09:19

## 2024-03-01 RX ADMIN — SPIRONOLACTONE 25 MG: 25 TABLET ORAL at 09:19

## 2024-03-01 RX ADMIN — METOPROLOL SUCCINATE 25 MG: 25 TABLET, FILM COATED, EXTENDED RELEASE ORAL at 09:19

## 2024-03-01 RX ADMIN — ATORVASTATIN CALCIUM 20 MG: 20 TABLET, FILM COATED ORAL at 21:19

## 2024-03-01 RX ADMIN — POTASSIUM CHLORIDE 40 MEQ: 1500 TABLET, EXTENDED RELEASE ORAL at 17:43

## 2024-03-01 RX ADMIN — POTASSIUM CHLORIDE 40 MEQ: 1500 TABLET, EXTENDED RELEASE ORAL at 09:19

## 2024-03-01 RX ADMIN — WARFARIN SODIUM 5 MG: 5 TABLET ORAL at 11:40

## 2024-03-01 RX ADMIN — HALOPERIDOL 0.5 MG: 0.5 TABLET ORAL at 21:23

## 2024-03-01 RX ADMIN — IPRATROPIUM BROMIDE AND ALBUTEROL SULFATE 1 DOSE: 2.5; .5 SOLUTION RESPIRATORY (INHALATION) at 16:12

## 2024-03-01 RX ADMIN — ASPIRIN 81 MG CHEWABLE TABLET 81 MG: 81 TABLET CHEWABLE at 09:19

## 2024-03-01 RX ADMIN — ARFORMOTEROL TARTRATE 15 MCG: 15 SOLUTION RESPIRATORY (INHALATION) at 20:13

## 2024-03-01 RX ADMIN — ENOXAPARIN SODIUM 120 MG: 150 INJECTION SUBCUTANEOUS at 21:19

## 2024-03-01 RX ADMIN — ENOXAPARIN SODIUM 120 MG: 150 INJECTION SUBCUTANEOUS at 09:19

## 2024-03-01 ASSESSMENT — PAIN SCALES - GENERAL
PAINLEVEL_OUTOF10: 0

## 2024-03-02 LAB
ANION GAP SERPL CALCULATED.3IONS-SCNC: 13 MMOL/L (ref 7–16)
BUN SERPL-MCNC: 50 MG/DL (ref 6–20)
CALCIUM SERPL-MCNC: 8.8 MG/DL (ref 8.6–10.2)
CHLORIDE SERPL-SCNC: 102 MMOL/L (ref 98–107)
CO2 SERPL-SCNC: 25 MMOL/L (ref 22–29)
CREAT SERPL-MCNC: 2.4 MG/DL (ref 0.7–1.2)
GFR SERPL CREATININE-BSD FRML MDRD: 31 ML/MIN/1.73M2
GLUCOSE SERPL-MCNC: 104 MG/DL (ref 74–99)
INR PPP: 2.3
POTASSIUM SERPL-SCNC: 3.5 MMOL/L (ref 3.5–5)
PROTHROMBIN TIME: 25.5 SEC (ref 9.3–12.4)
SODIUM SERPL-SCNC: 140 MMOL/L (ref 132–146)

## 2024-03-02 PROCEDURE — 80048 BASIC METABOLIC PNL TOTAL CA: CPT

## 2024-03-02 PROCEDURE — 6370000000 HC RX 637 (ALT 250 FOR IP): Performed by: INTERNAL MEDICINE

## 2024-03-02 PROCEDURE — 94640 AIRWAY INHALATION TREATMENT: CPT

## 2024-03-02 PROCEDURE — 99233 SBSQ HOSP IP/OBS HIGH 50: CPT | Performed by: INTERNAL MEDICINE

## 2024-03-02 PROCEDURE — 6360000002 HC RX W HCPCS: Performed by: INTERNAL MEDICINE

## 2024-03-02 PROCEDURE — 2580000003 HC RX 258: Performed by: INTERNAL MEDICINE

## 2024-03-02 PROCEDURE — 85610 PROTHROMBIN TIME: CPT

## 2024-03-02 PROCEDURE — 6370000000 HC RX 637 (ALT 250 FOR IP)

## 2024-03-02 PROCEDURE — 2060000000 HC ICU INTERMEDIATE R&B

## 2024-03-02 PROCEDURE — 6370000000 HC RX 637 (ALT 250 FOR IP): Performed by: CLINICAL NURSE SPECIALIST

## 2024-03-02 PROCEDURE — 99232 SBSQ HOSP IP/OBS MODERATE 35: CPT | Performed by: INTERNAL MEDICINE

## 2024-03-02 PROCEDURE — 6360000002 HC RX W HCPCS: Performed by: CLINICAL NURSE SPECIALIST

## 2024-03-02 RX ORDER — WARFARIN SODIUM 4 MG/1
4 TABLET ORAL
Status: COMPLETED | OUTPATIENT
Start: 2024-03-02 | End: 2024-03-02

## 2024-03-02 RX ADMIN — BUMETANIDE 2 MG: 1 TABLET ORAL at 21:25

## 2024-03-02 RX ADMIN — BUMETANIDE 2 MG: 1 TABLET ORAL at 08:06

## 2024-03-02 RX ADMIN — SPIRONOLACTONE 25 MG: 25 TABLET ORAL at 08:06

## 2024-03-02 RX ADMIN — ASPIRIN 81 MG CHEWABLE TABLET 81 MG: 81 TABLET CHEWABLE at 08:06

## 2024-03-02 RX ADMIN — ENOXAPARIN SODIUM 120 MG: 150 INJECTION SUBCUTANEOUS at 08:06

## 2024-03-02 RX ADMIN — Medication 1000 UNITS: at 08:06

## 2024-03-02 RX ADMIN — ARFORMOTEROL TARTRATE 15 MCG: 15 SOLUTION RESPIRATORY (INHALATION) at 08:09

## 2024-03-02 RX ADMIN — IPRATROPIUM BROMIDE AND ALBUTEROL SULFATE 1 DOSE: 2.5; .5 SOLUTION RESPIRATORY (INHALATION) at 12:46

## 2024-03-02 RX ADMIN — WARFARIN SODIUM 4 MG: 4 TABLET ORAL at 12:24

## 2024-03-02 RX ADMIN — IPRATROPIUM BROMIDE AND ALBUTEROL SULFATE 1 DOSE: 2.5; .5 SOLUTION RESPIRATORY (INHALATION) at 16:14

## 2024-03-02 RX ADMIN — ENOXAPARIN SODIUM 120 MG: 150 INJECTION SUBCUTANEOUS at 21:25

## 2024-03-02 RX ADMIN — METOPROLOL SUCCINATE 25 MG: 25 TABLET, FILM COATED, EXTENDED RELEASE ORAL at 08:06

## 2024-03-02 RX ADMIN — IPRATROPIUM BROMIDE AND ALBUTEROL SULFATE 1 DOSE: 2.5; .5 SOLUTION RESPIRATORY (INHALATION) at 08:09

## 2024-03-02 RX ADMIN — IPRATROPIUM BROMIDE AND ALBUTEROL SULFATE 1 DOSE: 2.5; .5 SOLUTION RESPIRATORY (INHALATION) at 19:48

## 2024-03-02 RX ADMIN — SODIUM CHLORIDE, PRESERVATIVE FREE 10 ML: 5 INJECTION INTRAVENOUS at 08:06

## 2024-03-02 RX ADMIN — HALOPERIDOL 0.5 MG: 0.5 TABLET ORAL at 21:25

## 2024-03-02 RX ADMIN — ARFORMOTEROL TARTRATE 15 MCG: 15 SOLUTION RESPIRATORY (INHALATION) at 19:48

## 2024-03-02 RX ADMIN — ATORVASTATIN CALCIUM 20 MG: 20 TABLET, FILM COATED ORAL at 21:25

## 2024-03-02 RX ADMIN — SODIUM CHLORIDE, PRESERVATIVE FREE 10 ML: 5 INJECTION INTRAVENOUS at 21:25

## 2024-03-02 ASSESSMENT — PAIN SCALES - GENERAL
PAINLEVEL_OUTOF10: 0

## 2024-03-03 PROBLEM — E78.5 HYPERLIPIDEMIA: Status: ACTIVE | Noted: 2024-03-03

## 2024-03-03 LAB
ANION GAP SERPL CALCULATED.3IONS-SCNC: 14 MMOL/L (ref 7–16)
BASOPHILS # BLD: 0.07 K/UL (ref 0–0.2)
BASOPHILS NFR BLD: 1 % (ref 0–2)
BUN SERPL-MCNC: 45 MG/DL (ref 6–20)
CALCIUM SERPL-MCNC: 9 MG/DL (ref 8.6–10.2)
CHLORIDE SERPL-SCNC: 103 MMOL/L (ref 98–107)
CO2 SERPL-SCNC: 26 MMOL/L (ref 22–29)
CREAT SERPL-MCNC: 2.1 MG/DL (ref 0.7–1.2)
EOSINOPHIL # BLD: 0.25 K/UL (ref 0.05–0.5)
EOSINOPHILS RELATIVE PERCENT: 3 % (ref 0–6)
ERYTHROCYTE [DISTWIDTH] IN BLOOD BY AUTOMATED COUNT: 19.2 % (ref 11.5–15)
GFR SERPL CREATININE-BSD FRML MDRD: 36 ML/MIN/1.73M2
GLUCOSE SERPL-MCNC: 106 MG/DL (ref 74–99)
HCT VFR BLD AUTO: 36.7 % (ref 37–54)
HGB BLD-MCNC: 10.4 G/DL (ref 12.5–16.5)
IMM GRANULOCYTES # BLD AUTO: 0.03 K/UL (ref 0–0.58)
IMM GRANULOCYTES NFR BLD: 0 % (ref 0–5)
INR PPP: 2.8
LYMPHOCYTES NFR BLD: 1.37 K/UL (ref 1.5–4)
LYMPHOCYTES RELATIVE PERCENT: 18 % (ref 20–42)
MAGNESIUM SERPL-MCNC: 1.7 MG/DL (ref 1.6–2.6)
MCH RBC QN AUTO: 25.2 PG (ref 26–35)
MCHC RBC AUTO-ENTMCNC: 28.3 G/DL (ref 32–34.5)
MCV RBC AUTO: 88.9 FL (ref 80–99.9)
MONOCYTES NFR BLD: 0.98 K/UL (ref 0.1–0.95)
MONOCYTES NFR BLD: 13 % (ref 2–12)
NEUTROPHILS NFR BLD: 64 % (ref 43–80)
NEUTS SEG NFR BLD: 4.82 K/UL (ref 1.8–7.3)
PHOSPHATE SERPL-MCNC: 3.2 MG/DL (ref 2.5–4.5)
PLATELET # BLD AUTO: 181 K/UL (ref 130–450)
PMV BLD AUTO: 12.2 FL (ref 7–12)
POTASSIUM SERPL-SCNC: 3.5 MMOL/L (ref 3.5–5)
PROTHROMBIN TIME: 31.1 SEC (ref 9.3–12.4)
RBC # BLD AUTO: 4.13 M/UL (ref 3.8–5.8)
RBC # BLD: ABNORMAL 10*6/UL
SODIUM SERPL-SCNC: 143 MMOL/L (ref 132–146)
WBC OTHER # BLD: 7.5 K/UL (ref 4.5–11.5)

## 2024-03-03 PROCEDURE — 99232 SBSQ HOSP IP/OBS MODERATE 35: CPT | Performed by: INTERNAL MEDICINE

## 2024-03-03 PROCEDURE — 94640 AIRWAY INHALATION TREATMENT: CPT

## 2024-03-03 PROCEDURE — 6370000000 HC RX 637 (ALT 250 FOR IP): Performed by: CLINICAL NURSE SPECIALIST

## 2024-03-03 PROCEDURE — 80048 BASIC METABOLIC PNL TOTAL CA: CPT

## 2024-03-03 PROCEDURE — 84100 ASSAY OF PHOSPHORUS: CPT

## 2024-03-03 PROCEDURE — 85025 COMPLETE CBC W/AUTO DIFF WBC: CPT

## 2024-03-03 PROCEDURE — 6370000000 HC RX 637 (ALT 250 FOR IP): Performed by: INTERNAL MEDICINE

## 2024-03-03 PROCEDURE — 6360000002 HC RX W HCPCS: Performed by: CLINICAL NURSE SPECIALIST

## 2024-03-03 PROCEDURE — 2060000000 HC ICU INTERMEDIATE R&B

## 2024-03-03 PROCEDURE — 6360000002 HC RX W HCPCS: Performed by: INTERNAL MEDICINE

## 2024-03-03 PROCEDURE — 83735 ASSAY OF MAGNESIUM: CPT

## 2024-03-03 PROCEDURE — 85610 PROTHROMBIN TIME: CPT

## 2024-03-03 PROCEDURE — 6370000000 HC RX 637 (ALT 250 FOR IP)

## 2024-03-03 PROCEDURE — 99233 SBSQ HOSP IP/OBS HIGH 50: CPT | Performed by: INTERNAL MEDICINE

## 2024-03-03 PROCEDURE — 2580000003 HC RX 258: Performed by: INTERNAL MEDICINE

## 2024-03-03 RX ORDER — POTASSIUM CHLORIDE 20 MEQ/1
20 TABLET, EXTENDED RELEASE ORAL ONCE
Status: COMPLETED | OUTPATIENT
Start: 2024-03-03 | End: 2024-03-03

## 2024-03-03 RX ORDER — MAGNESIUM SULFATE IN WATER 40 MG/ML
2000 INJECTION, SOLUTION INTRAVENOUS ONCE
Status: COMPLETED | OUTPATIENT
Start: 2024-03-03 | End: 2024-03-03

## 2024-03-03 RX ORDER — WARFARIN SODIUM 2.5 MG/1
2.5 TABLET ORAL
Status: COMPLETED | OUTPATIENT
Start: 2024-03-03 | End: 2024-03-03

## 2024-03-03 RX ADMIN — HALOPERIDOL 0.5 MG: 0.5 TABLET ORAL at 20:21

## 2024-03-03 RX ADMIN — IPRATROPIUM BROMIDE AND ALBUTEROL SULFATE 1 DOSE: 2.5; .5 SOLUTION RESPIRATORY (INHALATION) at 12:17

## 2024-03-03 RX ADMIN — IPRATROPIUM BROMIDE AND ALBUTEROL SULFATE 1 DOSE: 2.5; .5 SOLUTION RESPIRATORY (INHALATION) at 08:58

## 2024-03-03 RX ADMIN — WARFARIN SODIUM 2.5 MG: 2.5 TABLET ORAL at 11:40

## 2024-03-03 RX ADMIN — METOPROLOL SUCCINATE 25 MG: 25 TABLET, FILM COATED, EXTENDED RELEASE ORAL at 07:45

## 2024-03-03 RX ADMIN — BUMETANIDE 2 MG: 1 TABLET ORAL at 20:21

## 2024-03-03 RX ADMIN — ASPIRIN 81 MG CHEWABLE TABLET 81 MG: 81 TABLET CHEWABLE at 07:45

## 2024-03-03 RX ADMIN — SPIRONOLACTONE 25 MG: 25 TABLET ORAL at 07:45

## 2024-03-03 RX ADMIN — MAGNESIUM SULFATE HEPTAHYDRATE 2000 MG: 40 INJECTION, SOLUTION INTRAVENOUS at 11:41

## 2024-03-03 RX ADMIN — POTASSIUM CHLORIDE 20 MEQ: 1500 TABLET, EXTENDED RELEASE ORAL at 11:40

## 2024-03-03 RX ADMIN — Medication 1000 UNITS: at 07:45

## 2024-03-03 RX ADMIN — IPRATROPIUM BROMIDE AND ALBUTEROL SULFATE 1 DOSE: 2.5; .5 SOLUTION RESPIRATORY (INHALATION) at 16:12

## 2024-03-03 RX ADMIN — ATORVASTATIN CALCIUM 20 MG: 20 TABLET, FILM COATED ORAL at 20:21

## 2024-03-03 RX ADMIN — ARFORMOTEROL TARTRATE 15 MCG: 15 SOLUTION RESPIRATORY (INHALATION) at 08:57

## 2024-03-03 RX ADMIN — ARFORMOTEROL TARTRATE 15 MCG: 15 SOLUTION RESPIRATORY (INHALATION) at 19:49

## 2024-03-03 RX ADMIN — SODIUM CHLORIDE, PRESERVATIVE FREE 10 ML: 5 INJECTION INTRAVENOUS at 20:21

## 2024-03-03 RX ADMIN — BUMETANIDE 2 MG: 1 TABLET ORAL at 07:45

## 2024-03-03 RX ADMIN — SODIUM CHLORIDE, PRESERVATIVE FREE 10 ML: 5 INJECTION INTRAVENOUS at 07:46

## 2024-03-03 RX ADMIN — IPRATROPIUM BROMIDE AND ALBUTEROL SULFATE 1 DOSE: 2.5; .5 SOLUTION RESPIRATORY (INHALATION) at 19:49

## 2024-03-03 ASSESSMENT — PAIN SCALES - GENERAL
PAINLEVEL_OUTOF10: 0
PAINLEVEL_OUTOF10: 0

## 2024-03-04 LAB
ANION GAP SERPL CALCULATED.3IONS-SCNC: 15 MMOL/L (ref 7–16)
BNP SERPL-MCNC: 976 PG/ML (ref 0–125)
BUN SERPL-MCNC: 41 MG/DL (ref 6–20)
CALCIUM SERPL-MCNC: 9.3 MG/DL (ref 8.6–10.2)
CHLORIDE SERPL-SCNC: 99 MMOL/L (ref 98–107)
CO2 SERPL-SCNC: 27 MMOL/L (ref 22–29)
CREAT SERPL-MCNC: 2 MG/DL (ref 0.7–1.2)
GFR SERPL CREATININE-BSD FRML MDRD: 38 ML/MIN/1.73M2
GLUCOSE SERPL-MCNC: 113 MG/DL (ref 74–99)
INR PPP: 2.5
POTASSIUM SERPL-SCNC: 3.5 MMOL/L (ref 3.5–5)
PROTHROMBIN TIME: 27.2 SEC (ref 9.3–12.4)
SODIUM SERPL-SCNC: 141 MMOL/L (ref 132–146)

## 2024-03-04 PROCEDURE — 99232 SBSQ HOSP IP/OBS MODERATE 35: CPT | Performed by: INTERNAL MEDICINE

## 2024-03-04 PROCEDURE — 6370000000 HC RX 637 (ALT 250 FOR IP)

## 2024-03-04 PROCEDURE — 85610 PROTHROMBIN TIME: CPT

## 2024-03-04 PROCEDURE — 2060000000 HC ICU INTERMEDIATE R&B

## 2024-03-04 PROCEDURE — 80048 BASIC METABOLIC PNL TOTAL CA: CPT

## 2024-03-04 PROCEDURE — 6360000002 HC RX W HCPCS: Performed by: CLINICAL NURSE SPECIALIST

## 2024-03-04 PROCEDURE — 6370000000 HC RX 637 (ALT 250 FOR IP): Performed by: CLINICAL NURSE SPECIALIST

## 2024-03-04 PROCEDURE — 6370000000 HC RX 637 (ALT 250 FOR IP): Performed by: INTERNAL MEDICINE

## 2024-03-04 PROCEDURE — 36415 COLL VENOUS BLD VENIPUNCTURE: CPT

## 2024-03-04 PROCEDURE — 6360000002 HC RX W HCPCS: Performed by: INTERNAL MEDICINE

## 2024-03-04 PROCEDURE — 2580000003 HC RX 258: Performed by: INTERNAL MEDICINE

## 2024-03-04 PROCEDURE — 83880 ASSAY OF NATRIURETIC PEPTIDE: CPT

## 2024-03-04 PROCEDURE — 94640 AIRWAY INHALATION TREATMENT: CPT

## 2024-03-04 RX ORDER — WARFARIN SODIUM 4 MG/1
4 TABLET ORAL
Status: COMPLETED | OUTPATIENT
Start: 2024-03-04 | End: 2024-03-04

## 2024-03-04 RX ORDER — METOPROLOL SUCCINATE 25 MG/1
25 TABLET, EXTENDED RELEASE ORAL 2 TIMES DAILY
Status: DISCONTINUED | OUTPATIENT
Start: 2024-03-04 | End: 2024-03-05 | Stop reason: HOSPADM

## 2024-03-04 RX ADMIN — HALOPERIDOL 0.5 MG: 0.5 TABLET ORAL at 20:09

## 2024-03-04 RX ADMIN — BUMETANIDE 2 MG: 1 TABLET ORAL at 20:08

## 2024-03-04 RX ADMIN — IPRATROPIUM BROMIDE AND ALBUTEROL SULFATE 1 DOSE: 2.5; .5 SOLUTION RESPIRATORY (INHALATION) at 15:40

## 2024-03-04 RX ADMIN — IPRATROPIUM BROMIDE AND ALBUTEROL SULFATE 1 DOSE: 2.5; .5 SOLUTION RESPIRATORY (INHALATION) at 07:58

## 2024-03-04 RX ADMIN — PROCHLORPERAZINE EDISYLATE 10 MG: 5 INJECTION INTRAMUSCULAR; INTRAVENOUS at 23:18

## 2024-03-04 RX ADMIN — ARFORMOTEROL TARTRATE 15 MCG: 15 SOLUTION RESPIRATORY (INHALATION) at 19:20

## 2024-03-04 RX ADMIN — ATORVASTATIN CALCIUM 20 MG: 20 TABLET, FILM COATED ORAL at 20:05

## 2024-03-04 RX ADMIN — IPRATROPIUM BROMIDE AND ALBUTEROL SULFATE 1 DOSE: 2.5; .5 SOLUTION RESPIRATORY (INHALATION) at 12:09

## 2024-03-04 RX ADMIN — SODIUM CHLORIDE, PRESERVATIVE FREE 10 ML: 5 INJECTION INTRAVENOUS at 08:26

## 2024-03-04 RX ADMIN — EMPAGLIFLOZIN 10 MG: 10 TABLET, FILM COATED ORAL at 08:26

## 2024-03-04 RX ADMIN — ASPIRIN 81 MG CHEWABLE TABLET 81 MG: 81 TABLET CHEWABLE at 08:27

## 2024-03-04 RX ADMIN — METOPROLOL SUCCINATE 25 MG: 25 TABLET, FILM COATED, EXTENDED RELEASE ORAL at 20:08

## 2024-03-04 RX ADMIN — ARFORMOTEROL TARTRATE 15 MCG: 15 SOLUTION RESPIRATORY (INHALATION) at 07:58

## 2024-03-04 RX ADMIN — BUMETANIDE 2 MG: 1 TABLET ORAL at 08:27

## 2024-03-04 RX ADMIN — IPRATROPIUM BROMIDE AND ALBUTEROL SULFATE 1 DOSE: 2.5; .5 SOLUTION RESPIRATORY (INHALATION) at 19:20

## 2024-03-04 RX ADMIN — SODIUM CHLORIDE, PRESERVATIVE FREE 10 ML: 5 INJECTION INTRAVENOUS at 20:09

## 2024-03-04 RX ADMIN — Medication 1000 UNITS: at 08:27

## 2024-03-04 RX ADMIN — WARFARIN SODIUM 4 MG: 4 TABLET ORAL at 11:37

## 2024-03-04 RX ADMIN — METOPROLOL SUCCINATE 25 MG: 25 TABLET, FILM COATED, EXTENDED RELEASE ORAL at 08:26

## 2024-03-04 RX ADMIN — SPIRONOLACTONE 25 MG: 25 TABLET ORAL at 08:26

## 2024-03-04 ASSESSMENT — PAIN DESCRIPTION - FREQUENCY: FREQUENCY: CONTINUOUS

## 2024-03-04 ASSESSMENT — PAIN DESCRIPTION - ORIENTATION: ORIENTATION: LEFT

## 2024-03-04 ASSESSMENT — PAIN SCALES - GENERAL
PAINLEVEL_OUTOF10: 0
PAINLEVEL_OUTOF10: 0
PAINLEVEL_OUTOF10: 6

## 2024-03-04 ASSESSMENT — PAIN DESCRIPTION - ONSET: ONSET: ON-GOING

## 2024-03-04 ASSESSMENT — PAIN - FUNCTIONAL ASSESSMENT: PAIN_FUNCTIONAL_ASSESSMENT: ACTIVITIES ARE NOT PREVENTED

## 2024-03-04 ASSESSMENT — PAIN DESCRIPTION - LOCATION: LOCATION: CHEST

## 2024-03-04 ASSESSMENT — PAIN DESCRIPTION - PAIN TYPE: TYPE: ACUTE PAIN

## 2024-03-04 ASSESSMENT — PAIN DESCRIPTION - DESCRIPTORS: DESCRIPTORS: DISCOMFORT

## 2024-03-04 NOTE — CARE COORDINATION
CASE MANAGEMENT.... Cardio/Renal continue to follow for fluid overload. CHF nurse also on board. Started on Jardiance-14day free trial coupon was provided. All meds po. Labs/vitals stable. Tolerating room air. Spoke with Dr Olivo regarding poss dc. Will await renal input today and reeval. Confirmed dc plan is home with no needs. Will follow.

## 2024-03-05 ENCOUNTER — TELEPHONE (OUTPATIENT)
Dept: SURGERY | Age: 59
End: 2024-03-05

## 2024-03-05 VITALS
WEIGHT: 232.9 LBS | SYSTOLIC BLOOD PRESSURE: 96 MMHG | HEIGHT: 70 IN | TEMPERATURE: 97.9 F | RESPIRATION RATE: 18 BRPM | HEART RATE: 77 BPM | BODY MASS INDEX: 33.34 KG/M2 | OXYGEN SATURATION: 97 % | DIASTOLIC BLOOD PRESSURE: 51 MMHG

## 2024-03-05 LAB
ANION GAP SERPL CALCULATED.3IONS-SCNC: 13 MMOL/L (ref 7–16)
BUN SERPL-MCNC: 44 MG/DL (ref 6–20)
CALCIUM SERPL-MCNC: 9.1 MG/DL (ref 8.6–10.2)
CHLORIDE SERPL-SCNC: 97 MMOL/L (ref 98–107)
CO2 SERPL-SCNC: 26 MMOL/L (ref 22–29)
CREAT SERPL-MCNC: 2.4 MG/DL (ref 0.7–1.2)
GFR SERPL CREATININE-BSD FRML MDRD: 31 ML/MIN/1.73M2
GLUCOSE SERPL-MCNC: 108 MG/DL (ref 74–99)
INR PPP: 2.3
POTASSIUM SERPL-SCNC: 3.4 MMOL/L (ref 3.5–5)
PROTHROMBIN TIME: 26.3 SEC (ref 9.3–12.4)
SODIUM SERPL-SCNC: 136 MMOL/L (ref 132–146)

## 2024-03-05 PROCEDURE — 6370000000 HC RX 637 (ALT 250 FOR IP): Performed by: CLINICAL NURSE SPECIALIST

## 2024-03-05 PROCEDURE — 85610 PROTHROMBIN TIME: CPT

## 2024-03-05 PROCEDURE — 51798 US URINE CAPACITY MEASURE: CPT

## 2024-03-05 PROCEDURE — 2580000003 HC RX 258: Performed by: INTERNAL MEDICINE

## 2024-03-05 PROCEDURE — 6370000000 HC RX 637 (ALT 250 FOR IP): Performed by: INTERNAL MEDICINE

## 2024-03-05 PROCEDURE — 94640 AIRWAY INHALATION TREATMENT: CPT

## 2024-03-05 PROCEDURE — 99239 HOSP IP/OBS DSCHRG MGMT >30: CPT | Performed by: INTERNAL MEDICINE

## 2024-03-05 PROCEDURE — 6360000002 HC RX W HCPCS: Performed by: CLINICAL NURSE SPECIALIST

## 2024-03-05 PROCEDURE — 6370000000 HC RX 637 (ALT 250 FOR IP)

## 2024-03-05 PROCEDURE — 80048 BASIC METABOLIC PNL TOTAL CA: CPT

## 2024-03-05 PROCEDURE — 36415 COLL VENOUS BLD VENIPUNCTURE: CPT

## 2024-03-05 RX ORDER — POTASSIUM CHLORIDE 20 MEQ/1
40 TABLET, EXTENDED RELEASE ORAL
Status: COMPLETED | OUTPATIENT
Start: 2024-03-05 | End: 2024-03-05

## 2024-03-05 RX ORDER — WARFARIN SODIUM 2.5 MG/1
2.5 TABLET ORAL NIGHTLY
Qty: 30 TABLET | Refills: 0 | Status: SHIPPED | OUTPATIENT
Start: 2024-03-05

## 2024-03-05 RX ORDER — IPRATROPIUM BROMIDE AND ALBUTEROL SULFATE 2.5; .5 MG/3ML; MG/3ML
1 SOLUTION RESPIRATORY (INHALATION) EVERY 6 HOURS PRN
Qty: 360 ML | Refills: 3 | Status: SHIPPED | OUTPATIENT
Start: 2024-03-05

## 2024-03-05 RX ORDER — BUMETANIDE 1 MG/1
2 TABLET ORAL DAILY
Status: DISCONTINUED | OUTPATIENT
Start: 2024-03-05 | End: 2024-03-05 | Stop reason: HOSPADM

## 2024-03-05 RX ORDER — BUMETANIDE 2 MG/1
2 TABLET ORAL DAILY
Qty: 30 TABLET | Refills: 3 | Status: SHIPPED | OUTPATIENT
Start: 2024-03-06

## 2024-03-05 RX ORDER — SPIRONOLACTONE 25 MG/1
25 TABLET ORAL DAILY
Qty: 30 TABLET | Refills: 3 | Status: SHIPPED | OUTPATIENT
Start: 2024-03-06

## 2024-03-05 RX ORDER — WARFARIN SODIUM 5 MG/1
5 TABLET ORAL
Status: COMPLETED | OUTPATIENT
Start: 2024-03-05 | End: 2024-03-05

## 2024-03-05 RX ORDER — TRAZODONE HYDROCHLORIDE 50 MG/1
50 TABLET ORAL NIGHTLY
Qty: 5 TABLET | Refills: 0 | Status: SHIPPED | OUTPATIENT
Start: 2024-03-05 | End: 2024-03-10

## 2024-03-05 RX ORDER — METOPROLOL SUCCINATE 25 MG/1
25 TABLET, EXTENDED RELEASE ORAL 2 TIMES DAILY
Qty: 30 TABLET | Refills: 3 | Status: SHIPPED | OUTPATIENT
Start: 2024-03-05

## 2024-03-05 RX ORDER — ASPIRIN 81 MG/1
81 TABLET, CHEWABLE ORAL DAILY
Qty: 30 TABLET | Refills: 3 | Status: SHIPPED | OUTPATIENT
Start: 2024-03-06

## 2024-03-05 RX ORDER — ATORVASTATIN CALCIUM 20 MG/1
20 TABLET, FILM COATED ORAL NIGHTLY
Qty: 30 TABLET | Refills: 3 | Status: SHIPPED | OUTPATIENT
Start: 2024-03-05

## 2024-03-05 RX ORDER — VITAMIN B COMPLEX
1000 TABLET ORAL DAILY
Qty: 30 TABLET | Refills: 2 | Status: SHIPPED | OUTPATIENT
Start: 2024-03-06

## 2024-03-05 RX ADMIN — POTASSIUM CHLORIDE 40 MEQ: 1500 TABLET, EXTENDED RELEASE ORAL at 08:32

## 2024-03-05 RX ADMIN — POTASSIUM CHLORIDE 40 MEQ: 1500 TABLET, EXTENDED RELEASE ORAL at 10:28

## 2024-03-05 RX ADMIN — METOPROLOL SUCCINATE 25 MG: 25 TABLET, FILM COATED, EXTENDED RELEASE ORAL at 08:33

## 2024-03-05 RX ADMIN — IPRATROPIUM BROMIDE AND ALBUTEROL SULFATE 1 DOSE: 2.5; .5 SOLUTION RESPIRATORY (INHALATION) at 12:27

## 2024-03-05 RX ADMIN — ARFORMOTEROL TARTRATE 15 MCG: 15 SOLUTION RESPIRATORY (INHALATION) at 07:44

## 2024-03-05 RX ADMIN — IPRATROPIUM BROMIDE AND ALBUTEROL SULFATE 1 DOSE: 2.5; .5 SOLUTION RESPIRATORY (INHALATION) at 07:44

## 2024-03-05 RX ADMIN — BUMETANIDE 2 MG: 1 TABLET ORAL at 08:32

## 2024-03-05 RX ADMIN — EMPAGLIFLOZIN 10 MG: 10 TABLET, FILM COATED ORAL at 10:28

## 2024-03-05 RX ADMIN — WARFARIN SODIUM 5 MG: 5 TABLET ORAL at 10:28

## 2024-03-05 RX ADMIN — Medication 1000 UNITS: at 08:33

## 2024-03-05 RX ADMIN — SODIUM CHLORIDE, PRESERVATIVE FREE 10 ML: 5 INJECTION INTRAVENOUS at 08:33

## 2024-03-05 RX ADMIN — SPIRONOLACTONE 25 MG: 25 TABLET ORAL at 08:32

## 2024-03-05 RX ADMIN — ASPIRIN 81 MG CHEWABLE TABLET 81 MG: 81 TABLET CHEWABLE at 08:32

## 2024-03-05 ASSESSMENT — PAIN SCALES - GENERAL
PAINLEVEL_OUTOF10: 0
PAINLEVEL_OUTOF10: 0

## 2024-03-05 NOTE — PROGRESS NOTES
[facility] Hospitalist Progress Note    Admitting Date and Time: 2/27/2024 10:42 AM  Admit Dx: Troponin level elevated [R79.89]  Loculated pleural effusion [J90]  Acute decompensated heart failure (HCC) [I50.9]    Subjective:  Patient is being followed for Troponin level elevated [R79.89]  Loculated pleural effusion [J90]  Acute decompensated heart failure (HCC) [I50.9]   Pt seen and examined this morning  No acute events overnight  Denies any acute complaints today.    \Swelling in his right foot improved.  Wants to go home    ROS: denies fever, chills, cp, sob, n/v, HA unless stated above.      bumetanide  2 mg Oral Daily    metoprolol succinate  25 mg Oral BID    empagliflozin  10 mg Oral Daily    spironolactone  25 mg Oral Daily    Vitamin D  1,000 Units Oral Daily    warfarin placeholder: dosing by pharmacy   Other RX Placeholder    aspirin  81 mg Oral Daily    atorvastatin  20 mg Oral Nightly    arformoterol tartrate  15 mcg Nebulization BID RT    ipratropium 0.5 mg-albuterol 2.5 mg  1 Dose Inhalation 4x Daily RT    sodium chloride flush  5-40 mL IntraVENous 2 times per day     haloperidol, 0.5 mg, Nightly PRN  sodium chloride flush, 5-40 mL, PRN  sodium chloride, , PRN  polyethylene glycol, 17 g, Daily PRN  acetaminophen, 650 mg, Q6H PRN   Or  acetaminophen, 650 mg, Q6H PRN  prochlorperazine, 10 mg, Q8H PRN   Or  prochlorperazine, 10 mg, Q6H PRN         Objective:    BP (!) 96/51   Pulse 77   Temp 97.9 °F (36.6 °C) (Oral)   Resp 18   Ht 1.778 m (5' 10\")   Wt 105.6 kg (232 lb 14.4 oz)   SpO2 97%   BMI 33.42 kg/m²     General Appearance: alert and oriented to person, place and time and in no acute distress  Skin: warm and dry  Head: normocephalic and atraumatic  Eyes: pupils equal, round, and reactive to light, extraocular eye movements intact, conjunctivae normal  Neck: neck supple and non tender without mass   Pulmonary/Chest: clear to auscultation bilaterally- no wheezes, rales or rhonchi, 
     [facility] Hospitalist   Progress Note    Admitting Date and Time: 2/27/2024 10:42 AM  Admit Dx: Troponin level elevated [R79.89]  Loculated pleural effusion [J90]  Acute decompensated heart failure (HCC) [I50.9]    Subjective:    Patient was admitted with Troponin level elevated [R79.89]  Loculated pleural effusion [J90]  Acute decompensated heart failure (HCC) [I50.9]. Patient denies fever, chills, cp, sob, n/v.     spironolactone  25 mg Oral Daily    bumetanide  2 mg Oral BID    Vitamin D  1,000 Units Oral Daily    warfarin placeholder: dosing by pharmacy   Other RX Placeholder    aspirin  81 mg Oral Daily    atorvastatin  20 mg Oral Nightly    metoprolol succinate  25 mg Oral Daily    arformoterol tartrate  15 mcg Nebulization BID RT    ipratropium 0.5 mg-albuterol 2.5 mg  1 Dose Inhalation 4x Daily RT    sodium chloride flush  5-40 mL IntraVENous 2 times per day     haloperidol, 0.5 mg, Nightly PRN  sodium chloride flush, 5-40 mL, PRN  sodium chloride, , PRN  polyethylene glycol, 17 g, Daily PRN  acetaminophen, 650 mg, Q6H PRN   Or  acetaminophen, 650 mg, Q6H PRN  prochlorperazine, 10 mg, Q8H PRN   Or  prochlorperazine, 10 mg, Q6H PRN         Objective:    /73   Pulse 72   Temp 97.9 °F (36.6 °C) (Oral)   Resp 20   Ht 1.778 m (5' 10\")   Wt 110.2 kg (243 lb)   SpO2 95%   BMI 34.87 kg/m²   Skin: warm and dry, no rash or erythema  Pulmonary/Chest: clear to auscultation bilaterally- no wheezes, rales or rhonchi, normal air movement, no respiratory distress  Cardiovascular: rhythm reg at rate of 76  Abdomen: soft, non-tender, non-distended, normal bowel sounds, no masses or organomegaly  Extremities: no cyanosis, no clubbing, and pos for 1+ b/l le edema      Recent Labs     03/01/24  1610 03/02/24  0330 03/03/24  0104    140 143   K 3.6 3.5 3.5   CL 99 102 103   CO2 23 25 26   BUN 53* 50* 45*   CREATININE 2.4* 2.4* 2.1*   GLUCOSE 112* 104* 106*   CALCIUM 9.0 8.8 9.0       Recent 
     [facility] Hospitalist   Progress Note    Admitting Date and Time: 2/27/2024 10:42 AM  Admit Dx: Troponin level elevated [R79.89]  Loculated pleural effusion [J90]  Acute decompensated heart failure (HCC) [I50.9]    Subjective:    Patient was admitted with Troponin level elevated [R79.89]  Loculated pleural effusion [J90]  Acute decompensated heart failure (HCC) [I50.9]. Patient denies fever, chills, cp, sob, n/v.     enoxaparin  1 mg/kg SubCUTAneous BID    spironolactone  25 mg Oral Daily    bumetanide  2 mg Oral BID    Vitamin D  1,000 Units Oral Daily    warfarin placeholder: dosing by pharmacy   Other RX Placeholder    aspirin  81 mg Oral Daily    atorvastatin  20 mg Oral Nightly    metoprolol succinate  25 mg Oral Daily    arformoterol tartrate  15 mcg Nebulization BID RT    ipratropium 0.5 mg-albuterol 2.5 mg  1 Dose Inhalation 4x Daily RT    sodium chloride flush  5-40 mL IntraVENous 2 times per day     haloperidol, 0.5 mg, Nightly PRN  sodium chloride flush, 5-40 mL, PRN  sodium chloride, , PRN  polyethylene glycol, 17 g, Daily PRN  acetaminophen, 650 mg, Q6H PRN   Or  acetaminophen, 650 mg, Q6H PRN  prochlorperazine, 10 mg, Q8H PRN   Or  prochlorperazine, 10 mg, Q6H PRN         Objective:    /67   Pulse 80   Temp 97.6 °F (36.4 °C)   Resp 18   Ht 1.778 m (5' 10\")   Wt 111.2 kg (245 lb 3.2 oz)   SpO2 98%   BMI 35.18 kg/m²   Skin: warm and dry, no rash or erythema  Pulmonary/Chest: clear to auscultation bilaterally- no wheezes, rales or rhonchi, normal air movement, no respiratory distress  Cardiovascular: rhythm reg at rate of 84  Abdomen: soft, non-tender, non-distended, normal bowel sounds, no masses or organomegaly  Extremities: no cyanosis, no clubbing, and pos for 1-2+ b/l le edema      Recent Labs     03/01/24  0157 03/01/24  1610 03/02/24  0330    139 140   K 3.3* 3.6 3.5   CL 99 99 102   CO2 22 23 25   BUN 48* 53* 50*   CREATININE 2.3* 2.4* 2.4*   GLUCOSE 98 112* 
     [facility] Hospitalist   Progress Note    Admitting Date and Time: 2/27/2024 10:42 AM  Admit Dx: Troponin level elevated [R79.89]  Loculated pleural effusion [J90]  Acute decompensated heart failure (HCC) [I50.9]    Subjective:    Patient was admitted with Troponin level elevated [R79.89]  Loculated pleural effusion [J90]  Acute decompensated heart failure (HCC) [I50.9]. Patient  denies fever, chills, cp, sob, n/v.     Vitamin D  1,000 Units Oral Daily    [START ON 3/1/2024] warfarin placeholder: dosing by pharmacy   Other RX Placeholder    aspirin  81 mg Oral Daily    atorvastatin  20 mg Oral Nightly    metoprolol succinate  25 mg Oral Daily    arformoterol tartrate  15 mcg Nebulization BID RT    ipratropium 0.5 mg-albuterol 2.5 mg  1 Dose Inhalation 4x Daily RT    sodium chloride flush  5-40 mL IntraVENous 2 times per day     haloperidol, 0.5 mg, Nightly PRN  sodium chloride flush, 5-40 mL, PRN  sodium chloride, , PRN  polyethylene glycol, 17 g, Daily PRN  acetaminophen, 650 mg, Q6H PRN   Or  acetaminophen, 650 mg, Q6H PRN  prochlorperazine, 10 mg, Q8H PRN   Or  prochlorperazine, 10 mg, Q6H PRN         Objective:    /68   Pulse 78   Temp 97.6 °F (36.4 °C) (Oral)   Resp 18   Ht 1.778 m (5' 10\")   Wt 116.8 kg (257 lb 8 oz)   SpO2 96%   BMI 36.95 kg/m²   Skin: warm and dry, no rash or erythema  Pulmonary/Chest: clear to auscultation bilaterally- no wheezes, rales or rhonchi, normal air movement, no respiratory distress  Cardiovascular: rhythm reg at rate of 80  Abdomen: soft, non-tender, non-distended, normal bowel sounds, no masses or organomegaly  Extremities: no cyanosis, no clubbing, and pos for 1+ b/l le edema      Recent Labs     02/28/24  0359 02/29/24  0655 02/29/24  1619    134 136   K 3.6 3.6 3.5    98 100   CO2 20* 19* 20*   BUN 22* 41* 47*   CREATININE 1.8* 2.1* 2.3*   GLUCOSE 122* 84 106*   CALCIUM 9.0 9.2 8.8       Recent Labs     02/27/24  1003 02/28/24  0359 
    INPATIENT CARDIOLOGY FOLLOW-UP    Name: Len Dorsey    Age: 58 y.o.    Date of Admission: 2/27/2024 10:42 AM    Date of Service: 2/28/2024    Chief Complaint: Follow-up for acute superimposed upon chronic heart failure, coronary atherosclerosis, atrioventricular block, aortic valve disease, mitral valve disease, chronic obstructive lung disease, ethanol abuse with cirrhosis and prior withdrawal seizures, chronic kidney disease, moderate obesity    Interim History: The patient relates persistent dyspnea and orthopnea with minimal symptom improvement with present diuresis and incomplete maintenance of intake and output limiting adequate assessment of his present response to present fluid administration and a slight increase of serum creatinine levels.  Device interrogation as anticipated demonstrates pacemaker dependence with no atrial arrhythmias and arrhythmia monitoring demonstrates somewhat persistent elevation of heart rates.      Review of Systems:   The remainder of a complete multisystem review including consitutional, central nervous, respiratory, circulatory, gastrointestinal, genitourinary, endocrinologic, hematologic, musculoskeletal and psychiatric are negative.    Problem List:  Patient Active Problem List   Diagnosis    Chest pain    Closed fracture of nasal bones    Injury of face    Retroperitoneal hematoma    Vitamin D deficiency    Acute chest pain    Acute cholecystitis    Gastrointestinal hemorrhage    Polyp of colon    Right upper quadrant abdominal pain    Diarrhea    Current moderate episode of major depressive disorder without prior episode (HCC)    Hypertension    S/P MVR (mitral valve replacement)    S/P AVR (aortic valve replacement)    Coronary artery disease involving native coronary artery of native heart without angina pectoris    Paravalvular leak (prosthetic valve)    Hx of CABG    Warfarin anticoagulation    Subtherapeutic international normalized ratio (INR)    Moderate 
    INPATIENT CARDIOLOGY FOLLOW-UP    Name: Len Dorsey    Age: 58 y.o.    Date of Admission: 2/27/2024 10:42 AM    Date of Service: 2/29/2024    Chief Complaint: Follow-up for acute superimposed upon chronic combined systolic and diastolic heart failure, coronary atherosclerosis, atrioventricular block, aortic valve disease, mitral valve disease, chronic obstructive lung disease, ethanol abuse with associated cirrhosis and prior withdrawal seizures, chronic kidney disease, moderate obesity    Interim History: The patient continues to manifest evidence of gradual symptomatic improvement with ongoing fluid mobilization presently with continuous infusion diuretics and recommendation of the nephrology service and repeat assessment of renal function and electrolytes pending.  His echocardiogram was extremely suboptimal but suggested evidence of a borderline dilated left ventricular chamber with abnormal septal motion both on the basis of prior cardiac surgery and ventricular pacing with mild left ventricular systolic dysfunction and inadequate visualization of both his mechanical aortic and mitral prosthesis.  Interim assessment by the pulmonary service and recommendations have been reviewed.  In addition, assessment of the general surgical service and findings of his breast ultrasound as well as that of his left upper extremity ultrasound were reviewed.      Review of Systems:   The remainder of a complete multisystem review including consitutional, central nervous, respiratory, circulatory, gastrointestinal, genitourinary, endocrinologic, hematologic, musculoskeletal and psychiatric are negative.    Problem List:  Patient Active Problem List   Diagnosis    Chest pain    Closed fracture of nasal bones    Injury of face    Retroperitoneal hematoma    Vitamin D deficiency    Acute chest pain    Acute cholecystitis    Gastrointestinal hemorrhage    Polyp of colon    Right upper quadrant abdominal pain    Diarrhea    
    INPATIENT CARDIOLOGY FOLLOW-UP    Name: Len Dorsey    Age: 58 y.o.    Date of Admission: 2/27/2024 10:42 AM    Date of Service: 3/2/2024    Chief Complaint: Follow-up for CHF, CAD, VHD    Interim History:  No new overnight cardiac complaints. Currently with no complaints of CP, respiratory distress, palpitations, dizziness, or lightheadedness. LE edema slowly improving. Reported I/O's net negative 9.8 L.  on telemetry.    Review of Systems:   Cardiac: As per HPI  General: No fever, chills  Pulmonary: As per HPI  HEENT: No visual disturbances, difficult swallowing  GI: No nausea, vomiting  : No dysuria, hematuria  Endocrine: No thyroid disease or DM  Musculoskeletal: HALL x 4, no focal motor deficits  Skin: Intact, no rashes  Neuro: No headache, seizures  Psych: Currently with no depression, anxiety    Problem List:  Patient Active Problem List   Diagnosis    Chest pain    Closed fracture of nasal bones    Injury of face    Retroperitoneal hematoma    Vitamin D deficiency    Acute chest pain    Acute cholecystitis    Gastrointestinal hemorrhage    Polyp of colon    Right upper quadrant abdominal pain    Diarrhea    Current moderate episode of major depressive disorder without prior episode (HCC)    Hypertension    S/P MVR (mitral valve replacement)    S/P AVR (aortic valve replacement)    Coronary artery disease involving native coronary artery of native heart without angina pectoris    Paravalvular leak (prosthetic valve)    Hx of CABG    Warfarin anticoagulation    Subtherapeutic international normalized ratio (INR)    Moderate obesity    Dyspnea    Alcohol withdrawal syndrome without complication (HCC)    DTs (delirium tremens) ([facility])    Anemia    Severe protein-calorie malnutrition (HCC)    Acute decompensated heart failure (HCC)    Aortic valve disease    Mitral valve disease    Atrioventricular block    Pure hypercholesterolemia    Chronic obstructive pulmonary disease (HCC)    Loculated pleural 
    INPATIENT CARDIOLOGY FOLLOW-UP    Name: Len Dorsey    Age: 58 y.o.    Date of Admission: 2/27/2024 10:42 AM    Date of Service: 3/3/2024    Chief Complaint: Follow-up for CHF, CAD, VHD    Interim History:  No new overnight cardiac complaints. Currently with no complaints of CP, respiratory distress, palpitations, dizziness, or lightheadedness. LE edema slowly improving. Reported I/O's net negative 14 L.  on telemetry.    Review of Systems:   Cardiac: As per HPI  General: No fever, chills  Pulmonary: As per HPI  HEENT: No visual disturbances, difficult swallowing  GI: No nausea, vomiting  : No dysuria, hematuria  Endocrine: No thyroid disease or DM  Musculoskeletal: HALL x 4, no focal motor deficits  Skin: Intact, no rashes  Neuro: No headache, seizures  Psych: Currently with no depression, anxiety    Problem List:  Patient Active Problem List   Diagnosis    Chest pain    Closed fracture of nasal bones    Injury of face    Retroperitoneal hematoma    Vitamin D deficiency    Acute chest pain    Acute cholecystitis    Gastrointestinal hemorrhage    Polyp of colon    Right upper quadrant abdominal pain    Diarrhea    Current moderate episode of major depressive disorder without prior episode (HCC)    Hypertension    S/P MVR (mitral valve replacement)    S/P AVR (aortic valve replacement)    Coronary artery disease involving native coronary artery of native heart without angina pectoris    Paravalvular leak (prosthetic valve)    Hx of CABG    Warfarin anticoagulation    Subtherapeutic international normalized ratio (INR)    Moderate obesity    Dyspnea    Alcohol withdrawal syndrome without complication (HCC)    DTs (delirium tremens) ([facility])    Anemia    Severe protein-calorie malnutrition (HCC)    Acute decompensated heart failure (HCC)    Aortic valve disease    Mitral valve disease    Atrioventricular block    Pure hypercholesterolemia    Chronic obstructive pulmonary disease (HCC)    Loculated pleural 
Database initiated. Patient is A&O independent from home with fiance. States he uses no assistive devices and is RA at baseline.     
Department of Internal Medicine  Nephrology  Consult Note    Events reviewed.    SUBJECTIVE: We are following Len Dorsey for FRANTZ. Patient reports no complaints.    PHYSICAL EXAM:      Vitals:    VITALS:  BP (!) 114/94   Pulse (!) 102   Temp 97.6 °F (36.4 °C) (Oral)   Resp 20   Ht 1.778 m (5' 10\")   Wt 114.5 kg (252 lb 6.4 oz)   SpO2 97%   BMI 36.22 kg/m²   24HR INTAKE/OUTPUT:    Intake/Output Summary (Last 24 hours) at 3/1/2024 0844  Last data filed at 3/1/2024 0625  Gross per 24 hour   Intake 340 ml   Output 4300 ml   Net -3960 ml         Constitutional:  Awake, alert, oriented, in NAD  HEENT:  PERRLA, normocephalic, atraumatic  Respiratory:  CTA  Cardiovascular/Edema:  RRR, normal S1, normal S2  Gastrointestinal:  Soft, flat, non-distended, non-tender  Neurologic:  Nonfocal  Skin:  warm, dry, no rashes, no lesions  Other:  ++ edema    Scheduled Meds:   warfarin  5 mg Oral Once    enoxaparin  1 mg/kg SubCUTAneous BID    Vitamin D  1,000 Units Oral Daily    warfarin placeholder: dosing by pharmacy   Other RX Placeholder    aspirin  81 mg Oral Daily    atorvastatin  20 mg Oral Nightly    metoprolol succinate  25 mg Oral Daily    arformoterol tartrate  15 mcg Nebulization BID RT    ipratropium 0.5 mg-albuterol 2.5 mg  1 Dose Inhalation 4x Daily RT    sodium chloride flush  5-40 mL IntraVENous 2 times per day     Continuous Infusions:   bumetanide (BUMEX) 12.5 mg in sodium chloride 0.9 % 125 mL infusion 0.5 mg/hr (02/29/24 1024)    sodium chloride       PRN Meds:.haloperidol, sodium chloride flush, sodium chloride, polyethylene glycol, acetaminophen **OR** acetaminophen, prochlorperazine **OR** prochlorperazine     DATA:    CBC:   Lab Results   Component Value Date/Time    WBC 6.5 02/28/2024 03:59 AM    RBC 4.30 02/28/2024 03:59 AM    HGB 10.9 02/28/2024 03:59 AM    HCT 36.3 02/28/2024 03:59 AM    MCV 84.4 02/28/2024 03:59 AM    MCH 25.3 02/28/2024 03:59 AM    MCHC 30.0 02/28/2024 03:59 AM    RDW 19.7 
Department of Internal Medicine  Nephrology  progress Note    Events reviewed.    SUBJECTIVE: We are following Len Dorsey for FRANTZ. Patient reports no complaints.    PHYSICAL EXAM:      Vitals:    VITALS:  /77   Pulse 78   Temp 97.6 °F (36.4 °C) (Oral)   Resp 20   Ht 1.778 m (5' 10\")   Wt 110.2 kg (243 lb)   SpO2 96%   BMI 34.87 kg/m²   24HR INTAKE/OUTPUT:    Intake/Output Summary (Last 24 hours) at 3/3/2024 0950  Last data filed at 3/3/2024 0912  Gross per 24 hour   Intake 680 ml   Output 4725 ml   Net -4045 ml       Constitutional:  Awake, alert, oriented, in NAD  HEENT:  PERRLA, normocephalic, atraumatic  Respiratory:  CTA, crackles bases   Cardiovascular/Edema:  RRR, normal S1, normal S2  Gastrointestinal:  Soft, flat, non-distended, non-tender  Neurologic:  Nonfocal  Skin:  warm, dry, no rashes, no lesions  Other:  ++ edema    Scheduled Meds:   warfarin  2.5 mg Oral Once    enoxaparin  1 mg/kg SubCUTAneous BID    spironolactone  25 mg Oral Daily    bumetanide  2 mg Oral BID    Vitamin D  1,000 Units Oral Daily    warfarin placeholder: dosing by pharmacy   Other RX Placeholder    aspirin  81 mg Oral Daily    atorvastatin  20 mg Oral Nightly    metoprolol succinate  25 mg Oral Daily    arformoterol tartrate  15 mcg Nebulization BID RT    ipratropium 0.5 mg-albuterol 2.5 mg  1 Dose Inhalation 4x Daily RT    sodium chloride flush  5-40 mL IntraVENous 2 times per day     Continuous Infusions:   sodium chloride       PRN Meds:.haloperidol, sodium chloride flush, sodium chloride, polyethylene glycol, acetaminophen **OR** acetaminophen, prochlorperazine **OR** prochlorperazine     DATA:    CBC:   Lab Results   Component Value Date/Time    WBC 7.5 03/03/2024 01:04 AM    RBC 4.13 03/03/2024 01:04 AM    HGB 10.4 03/03/2024 01:04 AM    HCT 36.7 03/03/2024 01:04 AM    MCV 88.9 03/03/2024 01:04 AM    MCH 25.2 03/03/2024 01:04 AM    MCHC 28.3 03/03/2024 01:04 AM    RDW 19.2 03/03/2024 01:04 AM     
Department of Internal Medicine  Nephrology Nurse Practitioner Consult Note    Events reviewed.    SUBJECTIVE: We are following Len Dorsey for FRANTZ. Patient reports no complaints.    PHYSICAL EXAM:      Vitals:    VITALS:  /74   Pulse 83   Temp 97.9 °F (36.6 °C) (Oral)   Resp 22   Ht 1.778 m (5' 10\")   Wt 116.8 kg (257 lb 8 oz)   SpO2 99%   BMI 36.95 kg/m²   24HR INTAKE/OUTPUT:    Intake/Output Summary (Last 24 hours) at 2/29/2024 1324  Last data filed at 2/29/2024 1223  Gross per 24 hour   Intake 240 ml   Output 3775 ml   Net -3535 ml         Constitutional:  Awake, alert, oriented, in NAD  HEENT:  PERRLA, normocephalic, atraumatic  Respiratory:  CTA  Cardiovascular/Edema:  RRR, normal S1, normal S2  Gastrointestinal:  Soft, flat, non-distended, non-tender  Neurologic:  Nonfocal  Skin:  warm, dry, no rashes, no lesions  Other:  ++ edema    Scheduled Meds:   aspirin  81 mg Oral Daily    atorvastatin  20 mg Oral Nightly    metoprolol succinate  25 mg Oral Daily    arformoterol tartrate  15 mcg Nebulization BID RT    ipratropium 0.5 mg-albuterol 2.5 mg  1 Dose Inhalation 4x Daily RT    warfarin placeholder: dosing by pharmacy   Other RX Placeholder    sodium chloride flush  5-40 mL IntraVENous 2 times per day     Continuous Infusions:   bumetanide (BUMEX) 12.5 mg in sodium chloride 0.9 % 125 mL infusion 0.5 mg/hr (02/29/24 1024)    sodium chloride       PRN Meds:.haloperidol, sodium chloride flush, sodium chloride, polyethylene glycol, acetaminophen **OR** acetaminophen, prochlorperazine **OR** prochlorperazine     DATA:    CBC:   Lab Results   Component Value Date/Time    WBC 6.5 02/28/2024 03:59 AM    RBC 4.30 02/28/2024 03:59 AM    HGB 10.9 02/28/2024 03:59 AM    HCT 36.3 02/28/2024 03:59 AM    MCV 84.4 02/28/2024 03:59 AM    MCH 25.3 02/28/2024 03:59 AM    MCHC 30.0 02/28/2024 03:59 AM    RDW 19.7 02/28/2024 03:59 AM     02/28/2024 03:59 AM    MPV 12.1 02/28/2024 03:59 AM     CMP:    Lab 
Dr. Joiner notified of 4pm lab results.  
Dr. Joiner notified of BMP results, orders received   
Meghna notified of pt having 5bts vtach.   
New consult placed to Dr. Joiner.   
Notified Dr. Florez of new inpatient pulmonology consult.   
Notified Dr. Preston of patient's arrival to unit and need for admission orders. Stated patient okay for regular diet and he will be down to see patient.   
Pharmacy Consultation Note  (Warfarin Dosing and Monitoring)    Initial consult date: 2/27/2024  Consulting Provider: Dr. Nikhil Preston    Len Chandlerjeannebobbi is a 58 y.o. male for whom pharmacy has been asked to manage warfarin therapy.     Weight:   Wt Readings from Last 1 Encounters:   02/29/24 116.8 kg (257 lb 8 oz)       TSH:    Lab Results   Component Value Date/Time    TSH 2.590 02/06/2023 02:10 PM       Hepatic Function Panel:                          Lab Results   Component Value Date/Time    ALKPHOS 216 02/28/2024 03:59 AM    ALT 19 02/28/2024 03:59 AM    AST 23 02/28/2024 03:59 AM    PROT 7.1 02/28/2024 03:59 AM    BILITOT 1.7 02/28/2024 03:59 AM    BILIDIR 0.2 07/14/2023 05:43 PM    IBILI 0.2 07/14/2023 05:43 PM    LABALBU 3.9 02/28/2024 03:59 AM       Current significant warfarin drug-drug interactions include: No significant interactions    Recent Labs     02/27/24  1003 02/28/24  0359   HGB 10.8* 10.9*   PLT  --  179       Date Warfarin Dose INR Heparin or LMWH Comment   2/28 2.5 mg 3.1 --    2/29 5 mg 2.3 --                           Assessment:  Patient is a 58 y.o. male on warfarin for Atrial Fibrillation, Mechanical Heart Valve (mitral), and Mechanical Heart Valve (atrial).  Patient's home warfarin dosing regimen is documented as warfarin 2.5 mg nightly.   Goal INR 2.5 - 3.5  INR 2.3 today    Plan:  Will give warfarin 5 mg today  Daily PT/INR until the INR is stable within the therapeutic range  Pharmacist will follow and monitor/adjust dosing as necessary    Artie Ram, PharmD, BCCCP  2/29/2024  11:19 AM    SEB: [phone]  SEY: [phone]  SJW: [phone]  
Pharmacy Consultation Note  (Warfarin Dosing and Monitoring)    Initial consult date: 2/27/2024  Consulting Provider: Dr. Nikhil Preston    Len Kelinsylwias is a 58 y.o. male for whom pharmacy has been asked to manage warfarin therapy.     Weight:   Wt Readings from Last 1 Encounters:   03/03/24 110.2 kg (243 lb)       TSH:    Lab Results   Component Value Date/Time    TSH 2.590 02/06/2023 02:10 PM       Hepatic Function Panel:                          Lab Results   Component Value Date/Time    ALKPHOS 216 02/28/2024 03:59 AM    ALT 19 02/28/2024 03:59 AM    AST 23 02/28/2024 03:59 AM    PROT 7.1 02/28/2024 03:59 AM    BILITOT 1.7 02/28/2024 03:59 AM    BILIDIR 0.2 07/14/2023 05:43 PM    IBILI 0.2 07/14/2023 05:43 PM    LABALBU 3.9 02/28/2024 03:59 AM       Current significant warfarin drug-drug interactions include: No significant interactions    Recent Labs     03/03/24  0104   HGB 10.4*          Date Warfarin Dose INR Heparin or LMWH Comment   2/28 2.5 mg 3.1 --    2/29 5 mg 2.3 --    3/1 5 mg 2.1 Enoxaparin    3/2 4 mg 2.3 Enoxaparin    3/3 2.5 mg 2.8       Assessment:  Patient is a 58 y.o. male on warfarin for Atrial Fibrillation, Mechanical Heart Valve (mitral), and Mechanical Heart Valve (atrial).  Patient's home warfarin dosing regimen is documented as warfarin 2.5 mg nightly.   Goal INR 2.5 - 3.5  INR 2.8 today  Enoxaparin discontinued, INR now therapeutic     Plan:  Will give warfarin 2.5 mg today  Daily PT/INR until the INR is stable within the therapeutic range  Pharmacist will follow and monitor/adjust dosing as necessary    Merari Whinery, PharmD, BCPS, BCCCP 3/3/2024 9:16 AM    
Pharmacy Consultation Note  (Warfarin Dosing and Monitoring)    Initial consult date: 2/27/2024  Consulting Provider: Dr. Nikhil Preston    Len Sunita is a 58 y.o. male for whom pharmacy has been asked to manage warfarin therapy.     Weight:   Wt Readings from Last 1 Encounters:   02/28/24 114.5 kg (252 lb 8 oz)       TSH:    Lab Results   Component Value Date/Time    TSH 2.590 02/06/2023 02:10 PM       Hepatic Function Panel:                          Lab Results   Component Value Date/Time    ALKPHOS 216 02/28/2024 03:59 AM    ALT 19 02/28/2024 03:59 AM    AST 23 02/28/2024 03:59 AM    PROT 7.1 02/28/2024 03:59 AM    BILITOT 1.7 02/28/2024 03:59 AM    BILIDIR 0.2 07/14/2023 05:43 PM    IBILI 0.2 07/14/2023 05:43 PM    LABALBU 3.9 02/28/2024 03:59 AM       Current significant warfarin drug-drug interactions include: No significant interactions    Recent Labs     02/27/24  1003 02/28/24  0359   HGB 10.8* 10.9*   PLT  --  179     Date Warfarin Dose INR Heparin or LMWH Comment   2/28 2.5 mg 3.1 --                                  Assessment:  Patient is a 58 y.o. male on warfarin for Atrial Fibrillation, Mechanical Heart Valve (mitral), and Mechanical Heart Valve (atrial).  Patient's home warfarin dosing regimen is documented as warfarin 2.5 mg nightly.   Goal INR 2.5 - 3.5  INR 3.1 today    Plan:  Will give warfarin 2.5 mg tonight  Daily PT/INR until the INR is stable within the therapeutic range  Pharmacist will follow and monitor/adjust dosing as necessary    Artie Ram, PharmD, BCCCP  2/28/2024  1:07 PM    ZHOU: [phone]  SEY: [phone]  SJW: [phone]  
Pharmacy Consultation Note  (Warfarin Dosing and Monitoring)    Initial consult date: 2/27/2024  Consulting Provider: Dr. Nikhil Preston    Len Sunita is a 58 y.o. male for whom pharmacy has been asked to manage warfarin therapy.     Weight:   Wt Readings from Last 1 Encounters:   03/01/24 114.5 kg (252 lb 6.4 oz)       TSH:    Lab Results   Component Value Date/Time    TSH 2.590 02/06/2023 02:10 PM       Hepatic Function Panel:                          Lab Results   Component Value Date/Time    ALKPHOS 216 02/28/2024 03:59 AM    ALT 19 02/28/2024 03:59 AM    AST 23 02/28/2024 03:59 AM    PROT 7.1 02/28/2024 03:59 AM    BILITOT 1.7 02/28/2024 03:59 AM    BILIDIR 0.2 07/14/2023 05:43 PM    IBILI 0.2 07/14/2023 05:43 PM    LABALBU 3.9 02/28/2024 03:59 AM       Current significant warfarin drug-drug interactions include: No significant interactions    Recent Labs     02/28/24  0359   HGB 10.9*          Date Warfarin Dose INR Heparin or LMWH Comment   2/28 2.5 mg 3.1 --    2/29 5 mg 2.3 --    3/1 5 mg 2.1 Enoxaparin                    Assessment:  Patient is a 58 y.o. male on warfarin for Atrial Fibrillation, Mechanical Heart Valve (mitral), and Mechanical Heart Valve (atrial).  Patient's home warfarin dosing regimen is documented as warfarin 2.5 mg nightly.   Goal INR 2.5 - 3.5  INR 2.1 today  Enoxaparin is ordered as bridge anticoagulation    Plan:  Will give warfarin 5 mg today  Daily PT/INR until the INR is stable within the therapeutic range  Pharmacist will follow and monitor/adjust dosing as necessary    Artie Ram, PharmD, BCCCP  3/1/2024  12:04 PM    SEB: [phone]  SEY: [phone]  SJW: [phone]  
Pharmacy Consultation Note  (Warfarin Dosing and Monitoring)    Initial consult date: 2/27/2024  Consulting Provider: Dr. Nikhil Preston    Len Sunita is a 58 y.o. male for whom pharmacy has been asked to manage warfarin therapy.     Weight:   Wt Readings from Last 1 Encounters:   03/02/24 111.2 kg (245 lb 3.2 oz)       TSH:    Lab Results   Component Value Date/Time    TSH 2.590 02/06/2023 02:10 PM       Hepatic Function Panel:                          Lab Results   Component Value Date/Time    ALKPHOS 216 02/28/2024 03:59 AM    ALT 19 02/28/2024 03:59 AM    AST 23 02/28/2024 03:59 AM    PROT 7.1 02/28/2024 03:59 AM    BILITOT 1.7 02/28/2024 03:59 AM    BILIDIR 0.2 07/14/2023 05:43 PM    IBILI 0.2 07/14/2023 05:43 PM    LABALBU 3.9 02/28/2024 03:59 AM       Current significant warfarin drug-drug interactions include: No significant interactions    No results for input(s): \"HGB\", \"PLT\" in the last 72 hours.    Date Warfarin Dose INR Heparin or LMWH Comment   2/28 2.5 mg 3.1 --    2/29 5 mg 2.3 --    3/1 5 mg 2.1 Enoxaparin    3/2 4 mg 2.3 Enoxaparin             Assessment:  Patient is a 58 y.o. male on warfarin for Atrial Fibrillation, Mechanical Heart Valve (mitral), and Mechanical Heart Valve (atrial).  Patient's home warfarin dosing regimen is documented as warfarin 2.5 mg nightly.   Goal INR 2.5 - 3.5  INR 2.3 today  Enoxaparin is ordered as bridge anticoagulation    Plan:  Will give warfarin 4 mg today  Daily PT/INR until the INR is stable within the therapeutic range  Pharmacist will follow and monitor/adjust dosing as necessary    Angélica ParhamD, BCPS 3/2/2024 10:41 AM   Ext: [phone]     ZHOU: [phone]  SEY: [phone]  SJW: [phone]  
Pharmacy Consultation Note  (Warfarin Dosing and Monitoring)    Initial consult date: 2/27/2024  Consulting Provider: Dr. Nikhil Preston    Len Sunita is a 58 y.o. male for whom pharmacy has been asked to manage warfarin therapy.     Weight:   Wt Readings from Last 1 Encounters:   03/04/24 106.5 kg (234 lb 11.2 oz)       TSH:    Lab Results   Component Value Date/Time    TSH 2.590 02/06/2023 02:10 PM       Hepatic Function Panel:                          Lab Results   Component Value Date/Time    ALKPHOS 216 02/28/2024 03:59 AM    ALT 19 02/28/2024 03:59 AM    AST 23 02/28/2024 03:59 AM    PROT 7.1 02/28/2024 03:59 AM    BILITOT 1.7 02/28/2024 03:59 AM    BILIDIR 0.2 07/14/2023 05:43 PM    IBILI 0.2 07/14/2023 05:43 PM    LABALBU 3.9 02/28/2024 03:59 AM       Current significant warfarin drug-drug interactions include: No significant interactions    Recent Labs     03/03/24  0104   HGB 10.4*          Date Warfarin Dose INR Heparin or LMWH Comment   2/28 2.5 mg 3.1 --    2/29 5 mg 2.3 --    3/1 5 mg 2.1 Enoxaparin    3/2 4 mg 2.3 Enoxaparin    3/3 2.5 mg 2.8 --    3/4 4 mg 2.5 --                    Assessment:  Patient is a 58 y.o. male on warfarin for Atrial Fibrillation, Mechanical Heart Valve (mitral), and Mechanical Heart Valve (atrial).  Patient's home warfarin dosing regimen is documented as warfarin 2.5 mg nightly.   Goal INR 2.5 - 3.5  INR 2.5 today     Plan:  Will give warfarin 4 mg today  Daily PT/INR until the INR is stable within the therapeutic range  Pharmacist will follow and monitor/adjust dosing as necessary    Artie Ram, AngélicaD, BCCCP  3/4/2024  11:06 AM  
Pharmacy Consultation Note  (Warfarin Dosing and Monitoring)    Initial consult date: 2/27/2024  Consulting Provider: Dr. Nikhil Preston    Len Sunita is a 58 y.o. male for whom pharmacy has been asked to manage warfarin therapy.     Weight:   Wt Readings from Last 1 Encounters:   03/05/24 105.6 kg (232 lb 14.4 oz)       TSH:    Lab Results   Component Value Date/Time    TSH 2.590 02/06/2023 02:10 PM       Hepatic Function Panel:                          Lab Results   Component Value Date/Time    ALKPHOS 216 02/28/2024 03:59 AM    ALT 19 02/28/2024 03:59 AM    AST 23 02/28/2024 03:59 AM    PROT 7.1 02/28/2024 03:59 AM    BILITOT 1.7 02/28/2024 03:59 AM    BILIDIR 0.2 07/14/2023 05:43 PM    IBILI 0.2 07/14/2023 05:43 PM    LABALBU 3.9 02/28/2024 03:59 AM       Current significant warfarin drug-drug interactions include: No significant interactions    Recent Labs     03/03/24  0104   HGB 10.4*          Date Warfarin Dose INR Heparin or LMWH Comment   2/28 2.5 mg 3.1 --    2/29 5 mg 2.3 --    3/1 5 mg 2.1 Enoxaparin    3/2 4 mg 2.3 Enoxaparin    3/3 2.5 mg 2.8 --    3/4 4 mg 2.5 --    3/5 5 mg 2.3 --             Assessment:  Patient is a 58 y.o. male on warfarin for Atrial Fibrillation, Mechanical Heart Valve (mitral), and Mechanical Heart Valve (atrial).  Patient's home warfarin dosing regimen is documented as warfarin 2.5 mg nightly.   Goal INR 2.5 - 3.5  INR 2.3 today     Plan:  Will give warfarin 5 mg today  Daily PT/INR until the INR is stable within the therapeutic range  Pharmacist will follow and monitor/adjust dosing as necessary    Artie Ram, PharmD, BCCCP  3/5/2024  10:17 AM  
Pt bladder scanned at this time. PVR 11mL.   
Pt refusing bed/chair alarms. Educated on risk of falls and injury. Alarm waiver signed.   
Pt voided 150ml. PVR bladder scan revealed 129ml.  
Re-educated pt on risk of falls. Pt wishes for waiver to remain in place.   
Spoke w/ Poncho Marie from [employer] regarding patient's pacemaker interrogation. Stated device is functioning normally w/ 10 years left of device life. Also stated interrogation showed very few unsustained moments of vtach lasting from less than 1 second to 1 second in length.   
Spoke with Dr. Olivo in regards to patient K level this morning. New orders received.   
Text message sent to Dr. Caruso informing him of new consult.  
12.2 03/03/2024 01:04 AM     CMP:    Lab Results   Component Value Date/Time     03/04/2024 04:15 AM    K 3.5 03/04/2024 04:15 AM    K 3.9 09/15/2022 03:08 PM    CL 99 03/04/2024 04:15 AM    CO2 27 03/04/2024 04:15 AM    BUN 41 03/04/2024 04:15 AM    CREATININE 2.0 03/04/2024 04:15 AM    GFRAA >60 09/15/2022 03:08 PM    LABGLOM 38 03/04/2024 04:15 AM    GLUCOSE 113 03/04/2024 04:15 AM    PROT 7.1 02/28/2024 03:59 AM    LABALBU 3.9 02/28/2024 03:59 AM    CALCIUM 9.3 03/04/2024 04:15 AM    BILITOT 1.7 02/28/2024 03:59 AM    ALKPHOS 216 02/28/2024 03:59 AM    AST 23 02/28/2024 03:59 AM    ALT 19 02/28/2024 03:59 AM     Magnesium:    Lab Results   Component Value Date/Time    MG 1.7 03/03/2024 01:04 AM     Phosphorus:    Lab Results   Component Value Date/Time    PHOS 3.2 03/03/2024 01:04 AM     Radiology Review:    CTA PULMONARY W CONTRAST 2/27/24  1.  No PE.     2.  Multi chamber cardiac enlargement.  Dilated pulmonary artery trunk and  with IVC dilatation and hepatic vein reflux of contrast in keeping with right  heart failure/cor pulmonale.     3.  Left breast and left chest wall soft tissue edema and loculated left  pleural effusion which may be on the basis of venous obstruction.  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  Further correlation with left upper extremity deep venous ultrasound could be  obtained.     4.  Small ascites.  Cholelithiasis, unchanged.      BRIEF SUMMARY OF INITIAL CONSULT:    Briefly Mr. Dorsey is a 58-year-old man with history of CKD stage IIIa, with large proteinuria, probably due to nephrosclerosis, baseline creatinine 1.5-1.7 mg/dL, HTN, CAD status post CABG, HFpEF 55%, valvular heart disease status post AVR and MVR both mechanical valves, post pacemaker placement, hyperlipidemia, CVA, testicular cancer status postchemotherapy, alcohol abuse, fatty liver, who was admitted on 2/27/2024 for shortness of breath which she states has 
AM     03/03/2024 01:04 AM    MPV 12.2 03/03/2024 01:04 AM     CMP:    Lab Results   Component Value Date/Time     03/05/2024 05:57 AM    K 3.4 03/05/2024 05:57 AM    K 3.9 09/15/2022 03:08 PM    CL 97 03/05/2024 05:57 AM    CO2 26 03/05/2024 05:57 AM    BUN 44 03/05/2024 05:57 AM    CREATININE 2.4 03/05/2024 05:57 AM    GFRAA >60 09/15/2022 03:08 PM    LABGLOM 31 03/05/2024 05:57 AM    GLUCOSE 108 03/05/2024 05:57 AM    PROT 7.1 02/28/2024 03:59 AM    LABALBU 3.9 02/28/2024 03:59 AM    CALCIUM 9.1 03/05/2024 05:57 AM    BILITOT 1.7 02/28/2024 03:59 AM    ALKPHOS 216 02/28/2024 03:59 AM    AST 23 02/28/2024 03:59 AM    ALT 19 02/28/2024 03:59 AM     Magnesium:    Lab Results   Component Value Date/Time    MG 1.7 03/03/2024 01:04 AM     Phosphorus:    Lab Results   Component Value Date/Time    PHOS 3.2 03/03/2024 01:04 AM     Radiology Review:    CTA PULMONARY W CONTRAST 2/27/24  1.  No PE.     2.  Multi chamber cardiac enlargement.  Dilated pulmonary artery trunk and  with IVC dilatation and hepatic vein reflux of contrast in keeping with right  heart failure/cor pulmonale.     3.  Left breast and left chest wall soft tissue edema and loculated left  pleural effusion which may be on the basis of venous obstruction.  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  Further correlation with left upper extremity deep venous ultrasound could be  obtained.     4.  Small ascites.  Cholelithiasis, unchanged.      BRIEF SUMMARY OF INITIAL CONSULT:    Briefly Mr. Dorsey is a 58-year-old man with history of CKD stage IIIa, with large proteinuria, probably due to nephrosclerosis, baseline creatinine 1.5-1.7 mg/dL, HTN, CAD status post CABG, HFpEF 55%, valvular heart disease status post AVR and MVR both mechanical valves, post pacemaker placement, hyperlipidemia, CVA, testicular cancer status postchemotherapy, alcohol abuse, fatty liver, who was admitted on 2/27/2024 for 
Labs     02/28/24  0359   WBC 6.5   RBC 4.30   HGB 10.9*   HCT 36.3*   MCV 84.4   MCH 25.3*   MCHC 30.0*   RDW 19.7*      MPV 12.1*       BMP:    Lab Results   Component Value Date/Time     03/01/2024 04:10 PM    K 3.6 03/01/2024 04:10 PM    K 3.9 09/15/2022 03:08 PM    CL 99 03/01/2024 04:10 PM    CO2 23 03/01/2024 04:10 PM    BUN 53 03/01/2024 04:10 PM    LABALBU 3.9 02/28/2024 03:59 AM    CREATININE 2.4 03/01/2024 04:10 PM    CALCIUM 9.0 03/01/2024 04:10 PM    GFRAA >60 09/15/2022 03:08 PM    LABGLOM 31 03/01/2024 04:10 PM    GLUCOSE 112 03/01/2024 04:10 PM        Radiology:   XR CHEST (2 VW)   Final Result   1. Minimal pulmonary venous hypertension.   2. Interstitial opacity  may represent scarring or edema.   3. No focal alveolar consolidation.  No evidence of pleural fluid.         Vascular duplex upper extremity venous left   Final Result   No evidence of DVT.      1.0 cm simple cystic structure left upper medial arm.         US BREAST LIMITED LEFT   Final Result   1.  Subcutaneous edema in the left breast, no fluid collections to confirm   abscess.      2.  Consider mammography      BIRADS:   BIRADS - CATEGORY 2      Benign Findings.  Normal interval follow-up is recommended in 12 months.      OVERALL ASSESSMENT - BENIGN      A letter of notification will be sent to the patient regarding the    results.      The American College of Rad[employer] recommends annual mammograms for women    40   years and older.      XR CHEST PORTABLE   Final Result   Cardiomegaly with pulmonary vascular congestion. Possible small bilateral   pleural effusions.         CTA PULMONARY W CONTRAST   Final Result   1.  No PE.      2.  Multi chamber cardiac enlargement.  Dilated pulmonary artery trunk and   with IVC dilatation and hepatic vein reflux of contrast in keeping with right   heart failure/cor pulmonale.      3.  Left breast and left chest wall soft tissue edema and loculated left   pleural effusion which may be on 
created by: Marlena Hollingsworth on 2/29/2024 11:50 AM      Electronically signed by:  Florentino Lyles MD 2/29/2024 1:02 PM          
supple and non tender without mass   Pulmonary/Chest: clear to auscultation bilaterally- no wheezes, rales or rhonchi, normal air movement, no respiratory distress  Cardiovascular: normal rate, normal S1 and S2 and no carotid bruits  Abdomen: soft, non-tender, non-distended, normal bowel sounds, no masses or organomegaly  Extremities: no cyanosis, no clubbing.  Pitting edema right foot  Neurologic: no cranial nerve deficit and speech normal        Recent Labs     03/02/24  0330 03/03/24  0104 03/04/24  0415    143 141   K 3.5 3.5 3.5    103 99   CO2 25 26 27   BUN 50* 45* 41*   CREATININE 2.4* 2.1* 2.0*   GLUCOSE 104* 106* 113*   CALCIUM 8.8 9.0 9.3       Recent Labs     03/03/24  0104   WBC 7.5   RBC 4.13   HGB 10.4*   HCT 36.7*   MCV 88.9   MCH 25.2*   MCHC 28.3*   RDW 19.2*      MPV 12.2*         Assessment and Plan:     Principal Problem:    Acute decompensated heart failure (HCC)  Active Problems:    Moderate obesity    Aortic valve disease    Mitral valve disease    Atrioventricular block    Pure hypercholesterolemia    Chronic obstructive pulmonary disease (HCC)    Loculated pleural effusion    Troponin level elevated    Stage 2 chronic kidney disease    Alcohol use    Breast swelling    Pleural effusion    Hypophosphatemia    Acute congestive heart failure (HCC)    FRANTZ (acute kidney injury) (HCC)    Acidosis    Hyperglycemia    Hyperlipidemia  Resolved Problems:    * No resolved hospital problems. *    Acute on chronic HFmrEF.  Cardiology following.  Continue diuresis with Bumex 2 mg twice daily.  Improving.  FRANTZ on CKD.  Creatinine 2.0 today.  Baseline around 1.5.  Continue diuresis.  Nephrology following.  Continue to monitor  Loculated pleural effusion.  Pulmonology saw patient, no acute intervention.  But swelling versus mass.  GS evaluated patient, plan for mammogram as outpatient.  History of AVR and MVR in 2020 with repeat mechanical MVR and AVR in 2023.  Continue warfarin.  Alcohol 
  Benign Findings.  Normal interval follow-up is recommended in 12 months.      OVERALL ASSESSMENT - BENIGN      A letter of notification will be sent to the patient regarding the results.      The American College of Radiology recommends annual mammograms for women 40   years and older.         XR CHEST PORTABLE   Final Result   Cardiomegaly with pulmonary vascular congestion. Possible small bilateral   pleural effusions.         CTA PULMONARY W CONTRAST   Final Result   1.  No PE.      2.  Multi chamber cardiac enlargement.  Dilated pulmonary artery trunk and   with IVC dilatation and hepatic vein reflux of contrast in keeping with right   heart failure/cor pulmonale.      3.  Left breast and left chest wall soft tissue edema and loculated left   pleural effusion which may be on the basis of venous obstruction.  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   Further correlation with left upper extremity deep venous ultrasound could be   obtained.      4.  Small ascites.  Cholelithiasis, unchanged.         Vascular duplex lower extremity venous bilateral   Final Result   No evidence of DVT in either lower extremity.         Vascular duplex upper extremity venous left    (Results Pending)       Assessment:    Principal Problem:    Acute decompensated heart failure (HCC)  Active Problems:    Moderate obesity    Aortic valve disease    Mitral valve disease    Atrioventricular block    Pure hypercholesterolemia    Chronic obstructive pulmonary disease (HCC)    Loculated pleural effusion    Troponin level elevated    Stage 2 chronic kidney disease    Alcohol use    Breast swelling    Pleural effusion  Resolved Problems:    * No resolved hospital problems. *      Plan:  Pleural effusion loculated pulm consulted  Acute chf  cardio on consult. With worsening renal function, renal consulted. Monitor on bumex gtt  Acidosis monitor  Breast swelling vs mass  appreciate surgical input. 
she states has been going on for nearly a month.  His proBNP was 2040 and his chest x-ray showed cardiomegaly with pulmonary vascular congestion and possible small bilateral pleural effusions.  Creatinine on admission 1.6 mg/dL has increased to 1.8 mg/dL, reason for this consultation.    IMPRESSION/RECOMMENDATIONS:      FRANTZ stage I hemodynamically mediated 2/2 acute decompensated heart failure, baseline creatinine 1.5-1.7 mg/dL,  mL.  Renal function stable last 24 hours, creatinine has plateaued at 2.3 mg/dL with excellent urine output >5.1 L    CKD stage IIIa, probably due to nephrosclerosis, baseline creatinine 1.5-1.7 mg/dL, urine PCR 0.27 UACR 131    HFpEF 55%, proBNP 2040 >2186, on Bumex drip  Hypomagnesemia, 2/2 alcohol abuse, diuretics, magnesium 1.9, resolved  Hypokalemia, 2/2 diuretics, to replace  Hypophosphatemia 2/2 alcohol abuse, phosphorus 4.4, resolved,   Vitamin D deficiency, vitamin D 25 level 14.5, to start cholecalciferol  Normocytic anemia, ferritin 46, iron saturation 36%  -------------------------------------------  Alcohol abuse  CAD status post CABG  S/p AVR and MVR, both mechanical valves on heparin  Fatty liver  Left breast mass, ultrasound negative, general surgery following  Left pleural effusion, pulmonology following  History of testicular cancer  History of CVA    Plan:    Stop Bumex drip 0.5 mg/hr  Replace potassium as needed.  Continue to monitor blood pressure  Continue to monitor output  On oral Bumex.        Electronically signed by Matt Strong MD on 3/2/2024 at 11:14 AM   
all extremities and minimal pedal edema is present. No focal neurologic deficits are present.    Intake/Output:    Intake/Output Summary (Last 24 hours) at 3/4/2024 0659  Last data filed at 3/4/2024 0359  Gross per 24 hour   Intake 240 ml   Output 5575 ml   Net -5335 ml     I/O this shift:  In: -   Out: 3100 [Urine:3100]    Laboratory Tests:  Lab Results   Component Value Date    CREATININE 2.0 (H) 03/04/2024    BUN 41 (H) 03/04/2024     03/04/2024    K 3.5 03/04/2024    CL 99 03/04/2024    CO2 27 03/04/2024     No results for input(s): \"CKTOTAL\", \"CKMB\" in the last 72 hours.    Invalid input(s): \"TROPONONI\"  No results found for: \"BNP\"  Lab Results   Component Value Date/Time    WBC 7.5 03/03/2024 01:04 AM    RBC 4.13 03/03/2024 01:04 AM    HGB 10.4 03/03/2024 01:04 AM    HCT 36.7 03/03/2024 01:04 AM    MCV 88.9 03/03/2024 01:04 AM    MCH 25.2 03/03/2024 01:04 AM    MCHC 28.3 03/03/2024 01:04 AM    RDW 19.2 03/03/2024 01:04 AM     03/03/2024 01:04 AM    MPV 12.2 03/03/2024 01:04 AM     No results for input(s): \"ALKPHOS\", \"ALT\", \"AST\", \"PROT\", \"BILITOT\", \"BILIDIR\", \"LABALBU\" in the last 72 hours.  Lab Results   Component Value Date/Time    MG 1.7 03/03/2024 01:04 AM     Lab Results   Component Value Date/Time    PROTIME 27.2 03/04/2024 04:15 AM    PROTIME 96.0 09/15/2022 01:07 PM    INR 2.5 03/04/2024 04:15 AM     Lab Results   Component Value Date/Time    TSH 2.590 02/06/2023 02:10 PM     No components found for: \"CHLPL\"  Lab Results   Component Value Date    TRIG 146 02/06/2023    TRIG 149 03/24/2022     Lab Results   Component Value Date    HDL 33 02/06/2023    HDL 28 03/24/2022     Lab Results   Component Value Date    LDLCALC 72 02/06/2023    LDLCALC 28 03/24/2022       Cardiac Tests:  Telemetry findings reviewed: sinus rhythm with atrial sensing and ventricular pacing with accelerated idioventricular rhythm, no new tachy/bradyarrhythmias overnight      ASSESSMENT / PLAN: On a clinical basis, the 
and rhythm with no palpable thrill. No gallop rhythm or cardiac murmur are identified. A benign abdominal examination is present with no masses or organomegaly. Intact pulses are present throughout all extremities and no peripheral edema is present. No focal neurologic deficits are present.    Intake/Output:    Intake/Output Summary (Last 24 hours) at 3/1/2024 0849  Last data filed at 3/1/2024 0625  Gross per 24 hour   Intake 340 ml   Output 4300 ml   Net -3960 ml     No intake/output data recorded.    Laboratory Tests:  Lab Results   Component Value Date    CREATININE 2.3 (H) 03/01/2024    BUN 48 (H) 03/01/2024     03/01/2024    K 3.3 (L) 03/01/2024    CL 99 03/01/2024    CO2 22 03/01/2024     No results for input(s): \"CKTOTAL\", \"CKMB\" in the last 72 hours.    Invalid input(s): \"TROPONONI\"  No results found for: \"BNP\"  Lab Results   Component Value Date/Time    WBC 6.5 02/28/2024 03:59 AM    RBC 4.30 02/28/2024 03:59 AM    HGB 10.9 02/28/2024 03:59 AM    HCT 36.3 02/28/2024 03:59 AM    MCV 84.4 02/28/2024 03:59 AM    MCH 25.3 02/28/2024 03:59 AM    MCHC 30.0 02/28/2024 03:59 AM    RDW 19.7 02/28/2024 03:59 AM     02/28/2024 03:59 AM    MPV 12.1 02/28/2024 03:59 AM     Recent Labs     02/27/24  1003 02/28/24  0359   ALKPHOS 207* 216*   ALT 22 19   AST 33 23   PROT 6.9 7.1   BILITOT 2.0* 1.7*   LABALBU 3.8 3.9     Lab Results   Component Value Date/Time    MG 1.9 02/29/2024 06:55 AM     Lab Results   Component Value Date/Time    PROTIME 24.1 03/01/2024 01:57 AM    PROTIME 96.0 09/15/2022 01:07 PM    INR 2.1 03/01/2024 01:57 AM     Lab Results   Component Value Date/Time    TSH 2.590 02/06/2023 02:10 PM     No components found for: \"CHLPL\"  Lab Results   Component Value Date    TRIG 146 02/06/2023    TRIG 149 03/24/2022     Lab Results   Component Value Date    HDL 33 02/06/2023    HDL 28 03/24/2022     Lab Results   Component Value Date    LDLCALC 72 02/06/2023    LDLCALC 28 03/24/2022       Cardiac 
will be deferred to primary care and the nephrology service and we will further evaluate him should additional cardiovascular difficulties or concerns arise with arrangements made for follow-up with his primary cardiologist, Johan Harvey on an outpatient basis in addition to that of follow-up with the heart failure coordinator and heart failure clinic.      Note: This report was completed utilizing computer voice recognition software. Every effort has been made to ensure accuracy, however; inadvertent computerized transcription errors may be present.    Florentino Lyles MD  [facility]

## 2024-03-05 NOTE — DISCHARGE SUMMARY
discharge papers, discussing discharge with patient, medication review, etc.    Signed:  Electronically signed by Peter Olivo MD on 3/5/2024 at 1:24 PM

## 2024-03-05 NOTE — TELEPHONE ENCOUNTER
Called patient to schedule hospital follow up appt. Left voicemail to call back. Electronically signed by Sudha Bullock MA on 3/5/2024 at 3:29 PM

## 2024-03-05 NOTE — CARE COORDINATION
CASE MANAGEMENT.... Renal monitoring labs. B/Cr 44/2.4. Discharge today pending repeat labs at 4pm. Tolerating room air. Plan is home with no needs. Will follow.

## 2024-03-05 NOTE — PLAN OF CARE
Problem: Discharge Planning  Goal: Discharge to home or other facility with appropriate resources  2/29/2024 0141 by Norma Roberto RN  Outcome: Progressing  Flowsheets (Taken 2/29/2024 0015)  Discharge to home or other facility with appropriate resources: Identify barriers to discharge with patient and caregiver  2/28/2024 1731 by Marita Dejesus RN  Outcome: Progressing     Problem: ABCDS Injury Assessment  Goal: Absence of physical injury  2/29/2024 0141 by Norma Roberto RN  Outcome: Progressing  2/28/2024 1731 by Marita Dejesus RN  Outcome: Progressing     Problem: Pain  Goal: Verbalizes/displays adequate comfort level or baseline comfort level  2/29/2024 0141 by Norma Roberto RN  Outcome: Progressing  Flowsheets (Taken 2/29/2024 0015)  Verbalizes/displays adequate comfort level or baseline comfort level: Encourage patient to monitor pain and request assistance  2/28/2024 1731 by Marita Dejesus RN  Outcome: Progressing     Problem: Chronic Conditions and Co-morbidities  Goal: Patient's chronic conditions and co-morbidity symptoms are monitored and maintained or improved  2/29/2024 0141 by Norma Roberto RN  Outcome: Progressing  Flowsheets (Taken 2/29/2024 0015)  Care Plan - Patient's Chronic Conditions and Co-Morbidity Symptoms are Monitored and Maintained or Improved: Monitor and assess patient's chronic conditions and comorbid symptoms for stability, deterioration, or improvement  2/28/2024 1731 by Marita Dejesus RN  Outcome: Progressing     Problem: Skin/Tissue Integrity  Goal: Absence of new skin breakdown  Description: 1.  Monitor for areas of redness and/or skin breakdown  2.  Assess vascular access sites hourly  3.  Every 4-6 hours minimum:  Change oxygen saturation probe site  4.  Every 4-6 hours:  If on nasal continuous positive airway pressure, respiratory therapy assess nares and determine need for appliance change or resting period.  2/29/2024 0141 by Norma Roberto 
  Problem: Discharge Planning  Goal: Discharge to home or other facility with appropriate resources  2/29/2024 0832 by Rich Easton RN  Outcome: Progressing  2/29/2024 0141 by Norma Roberto RN  Outcome: Progressing  Flowsheets (Taken 2/29/2024 0015)  Discharge to home or other facility with appropriate resources: Identify barriers to discharge with patient and caregiver     Problem: ABCDS Injury Assessment  Goal: Absence of physical injury  2/29/2024 0832 by Rich Easton RN  Outcome: Progressing  2/29/2024 0141 by Norma Roberto RN  Outcome: Progressing     Problem: Pain  Goal: Verbalizes/displays adequate comfort level or baseline comfort level  2/29/2024 0832 by Rich Easton RN  Outcome: Progressing  2/29/2024 0141 by Norma Roberto RN  Outcome: Progressing  Flowsheets (Taken 2/29/2024 0015)  Verbalizes/displays adequate comfort level or baseline comfort level: Encourage patient to monitor pain and request assistance     Problem: Chronic Conditions and Co-morbidities  Goal: Patient's chronic conditions and co-morbidity symptoms are monitored and maintained or improved  2/29/2024 0832 by Rich Easton RN  Outcome: Progressing  2/29/2024 0141 by Norma Roberto RN  Outcome: Progressing  Flowsheets (Taken 2/29/2024 0015)  Care Plan - Patient's Chronic Conditions and Co-Morbidity Symptoms are Monitored and Maintained or Improved: Monitor and assess patient's chronic conditions and comorbid symptoms for stability, deterioration, or improvement     Problem: Skin/Tissue Integrity  Goal: Absence of new skin breakdown  Description: 1.  Monitor for areas of redness and/or skin breakdown  2.  Assess vascular access sites hourly  3.  Every 4-6 hours minimum:  Change oxygen saturation probe site  4.  Every 4-6 hours:  If on nasal continuous positive airway pressure, respiratory therapy assess nares and determine need for appliance change or resting period.  2/29/2024 0832 by Rich Easton RN  Outcome: 
  Problem: Discharge Planning  Goal: Discharge to home or other facility with appropriate resources  3/4/2024 2115 by Jaja West RN  Outcome: Progressing     Problem: ABCDS Injury Assessment  Goal: Absence of physical injury  3/4/2024 2115 by Jaja West RN  Outcome: Progressing     Problem: Pain  Goal: Verbalizes/displays adequate comfort level or baseline comfort level  3/4/2024 2115 by Jaja West RN  Outcome: Progressing     Problem: Chronic Conditions and Co-morbidities  Goal: Patient's chronic conditions and co-morbidity symptoms are monitored and maintained or improved  3/4/2024 2115 by Jaja West, RN  Outcome: Progressing     Problem: Skin/Tissue Integrity  Goal: Absence of new skin breakdown  Description: 1.  Monitor for areas of redness and/or skin breakdown  2.  Assess vascular access sites hourly  3.  Every 4-6 hours minimum:  Change oxygen saturation probe site  4.  Every 4-6 hours:  If on nasal continuous positive airway pressure, respiratory therapy assess nares and determine need for appliance change or resting period.  3/4/2024 2115 by Jaja West, RN  Outcome: Progressing     Problem: Safety - Adult  Goal: Free from fall injury  3/4/2024 2115 by Jaja West, RN  Outcome: Progressing     
  Problem: Discharge Planning  Goal: Discharge to home or other facility with appropriate resources  3/5/2024 0923 by Reshma Luong RN  Outcome: Progressing     Problem: ABCDS Injury Assessment  Goal: Absence of physical injury  3/5/2024 0923 by Reshma Luong RN  Outcome: Progressing     Problem: Pain  Goal: Verbalizes/displays adequate comfort level or baseline comfort level  3/5/2024 0923 by Reshma Luong RN  Outcome: Progressing     Problem: Chronic Conditions and Co-morbidities  Goal: Patient's chronic conditions and co-morbidity symptoms are monitored and maintained or improved  3/5/2024 0923 by Reshma Luong RN  Outcome: Progressing     Problem: Skin/Tissue Integrity  Goal: Absence of new skin breakdown  Description: 1.  Monitor for areas of redness and/or skin breakdown  2.  Assess vascular access sites hourly  3.  Every 4-6 hours minimum:  Change oxygen saturation probe site  4.  Every 4-6 hours:  If on nasal continuous positive airway pressure, respiratory therapy assess nares and determine need for appliance change or resting period.  3/5/2024 0923 by Reshma Luong RN  Outcome: Progressing     Problem: Safety - Adult  Goal: Free from fall injury  3/5/2024 0923 by Reshma Luong RN  Outcome: Progressing     
  Problem: Discharge Planning  Goal: Discharge to home or other facility with appropriate resources  3/5/2024 1329 by Reshma Luong RN  Outcome: Completed     Problem: ABCDS Injury Assessment  Goal: Absence of physical injury  3/5/2024 1329 by Reshma Luong RN  Outcome: Completed     Problem: Pain  Goal: Verbalizes/displays adequate comfort level or baseline comfort level  3/5/2024 1329 by Reshma Luong RN  Outcome: Completed     Problem: Chronic Conditions and Co-morbidities  Goal: Patient's chronic conditions and co-morbidity symptoms are monitored and maintained or improved  3/5/2024 1329 by Reshma Luong RN  Outcome: Completed     Problem: Skin/Tissue Integrity  Goal: Absence of new skin breakdown  Description: 1.  Monitor for areas of redness and/or skin breakdown  2.  Assess vascular access sites hourly  3.  Every 4-6 hours minimum:  Change oxygen saturation probe site  4.  Every 4-6 hours:  If on nasal continuous positive airway pressure, respiratory therapy assess nares and determine need for appliance change or resting period.  3/5/2024 1329 by Reshma Luong RN  Outcome: Completed     Problem: Safety - Adult  Goal: Free from fall injury  3/5/2024 1329 by Resham Luong RN  Outcome: Completed     
  Problem: Discharge Planning  Goal: Discharge to home or other facility with appropriate resources  Outcome: Progressing     Problem: ABCDS Injury Assessment  Goal: Absence of physical injury  Outcome: Progressing     Problem: Pain  Goal: Verbalizes/displays adequate comfort level or baseline comfort level  Outcome: Progressing     Problem: Chronic Conditions and Co-morbidities  Goal: Patient's chronic conditions and co-morbidity symptoms are monitored and maintained or improved  Outcome: Progressing     Problem: Skin/Tissue Integrity  Goal: Absence of new skin breakdown  Description: 1.  Monitor for areas of redness and/or skin breakdown  2.  Assess vascular access sites hourly  3.  Every 4-6 hours minimum:  Change oxygen saturation probe site  4.  Every 4-6 hours:  If on nasal continuous positive airway pressure, respiratory therapy assess nares and determine need for appliance change or resting period.  Outcome: Progressing     Problem: Safety - Adult  Goal: Free from fall injury  Outcome: Progressing     
  Problem: Discharge Planning  Goal: Discharge to home or other facility with appropriate resources  Outcome: Progressing     Problem: ABCDS Injury Assessment  Goal: Absence of physical injury  Outcome: Progressing     Problem: Pain  Goal: Verbalizes/displays adequate comfort level or baseline comfort level  Outcome: Progressing     Problem: Chronic Conditions and Co-morbidities  Goal: Patient's chronic conditions and co-morbidity symptoms are monitored and maintained or improved  Outcome: Progressing     Problem: Skin/Tissue Integrity  Goal: Absence of new skin breakdown  Description: 1.  Monitor for areas of redness and/or skin breakdown  2.  Assess vascular access sites hourly  3.  Every 4-6 hours minimum:  Change oxygen saturation probe site  4.  Every 4-6 hours:  If on nasal continuous positive airway pressure, respiratory therapy assess nares and determine need for appliance change or resting period.  Outcome: Progressing     Problem: Safety - Adult  Goal: Free from fall injury  Outcome: Progressing     
  Problem: Discharge Planning  Goal: Discharge to home or other facility with appropriate resources  Outcome: Progressing  Flowsheets (Taken 2/27/2024 1504 by Elena Romero, RN)  Discharge to home or other facility with appropriate resources: Refer to discharge planning if patient needs post-hospital services based on physician order or complex needs related to functional status, cognitive ability or social support system     Problem: ABCDS Injury Assessment  Goal: Absence of physical injury  Outcome: Progressing     Problem: Pain  Goal: Verbalizes/displays adequate comfort level or baseline comfort level  Outcome: Progressing     Problem: Chronic Conditions and Co-morbidities  Goal: Patient's chronic conditions and co-morbidity symptoms are monitored and maintained or improved  Outcome: Progressing     Problem: Skin/Tissue Integrity  Goal: Absence of new skin breakdown  Description: 1.  Monitor for areas of redness and/or skin breakdown  2.  Assess vascular access sites hourly  3.  Every 4-6 hours minimum:  Change oxygen saturation probe site  4.  Every 4-6 hours:  If on nasal continuous positive airway pressure, respiratory therapy assess nares and determine need for appliance change or resting period.  Outcome: Progressing     
  Problem: Discharge Planning  Goal: Discharge to home or other facility with appropriate resources  Outcome: Progressing  Flowsheets (Taken 3/2/2024 2115)  Discharge to home or other facility with appropriate resources: Identify barriers to discharge with patient and caregiver     Problem: ABCDS Injury Assessment  Goal: Absence of physical injury  Outcome: Progressing     Problem: Pain  Goal: Verbalizes/displays adequate comfort level or baseline comfort level  Outcome: Progressing     Problem: Chronic Conditions and Co-morbidities  Goal: Patient's chronic conditions and co-morbidity symptoms are monitored and maintained or improved  Outcome: Progressing  Flowsheets (Taken 3/2/2024 2115)  Care Plan - Patient's Chronic Conditions and Co-Morbidity Symptoms are Monitored and Maintained or Improved: Monitor and assess patient's chronic conditions and comorbid symptoms for stability, deterioration, or improvement     Problem: Skin/Tissue Integrity  Goal: Absence of new skin breakdown  Description: 1.  Monitor for areas of redness and/or skin breakdown  2.  Assess vascular access sites hourly  3.  Every 4-6 hours minimum:  Change oxygen saturation probe site  4.  Every 4-6 hours:  If on nasal continuous positive airway pressure, respiratory therapy assess nares and determine need for appliance change or resting period.  Outcome: Progressing     Problem: Safety - Adult  Goal: Free from fall injury  Outcome: Progressing     
  Problem: Discharge Planning  Goal: Discharge to home or other facility with appropriate resources  Outcome: Progressing  Flowsheets (Taken 3/3/2024 2015)  Discharge to home or other facility with appropriate resources: Identify barriers to discharge with patient and caregiver     Problem: ABCDS Injury Assessment  Goal: Absence of physical injury  Outcome: Progressing     Problem: Pain  Goal: Verbalizes/displays adequate comfort level or baseline comfort level  Outcome: Progressing     Problem: Chronic Conditions and Co-morbidities  Goal: Patient's chronic conditions and co-morbidity symptoms are monitored and maintained or improved  Outcome: Progressing  Flowsheets (Taken 3/3/2024 2015)  Care Plan - Patient's Chronic Conditions and Co-Morbidity Symptoms are Monitored and Maintained or Improved: Monitor and assess patient's chronic conditions and comorbid symptoms for stability, deterioration, or improvement     Problem: Skin/Tissue Integrity  Goal: Absence of new skin breakdown  Description: 1.  Monitor for areas of redness and/or skin breakdown  2.  Assess vascular access sites hourly  3.  Every 4-6 hours minimum:  Change oxygen saturation probe site  4.  Every 4-6 hours:  If on nasal continuous positive airway pressure, respiratory therapy assess nares and determine need for appliance change or resting period.  Outcome: Progressing     Problem: Safety - Adult  Goal: Free from fall injury  Outcome: Progressing     
RN  Outcome: Progressing  3/3/2024 2305 by Norma Roberto, RN  Outcome: Progressing

## 2024-03-06 ENCOUNTER — TELEPHONE (OUTPATIENT)
Dept: SURGERY | Age: 59
End: 2024-03-06

## 2024-03-06 NOTE — TELEPHONE ENCOUNTER
Scheduled pt for mammogram 3/21/24 at 10:15, arrive by 10:00 at [facility] [place]. No deodorant, lotion or powder, bring photo ID and insurance card. Left voicemail for pt to call back to advise. Electronically signed by Sudha Bullock MA on 3/6/2024 at 11:26 AM

## 2024-03-11 ENCOUNTER — TELEPHONE (OUTPATIENT)
Dept: OTHER | Age: 59
End: 2024-03-11

## 2024-03-15 ENCOUNTER — TELEPHONE (OUTPATIENT)
Dept: OTHER | Age: 59
End: 2024-03-15

## 2024-03-15 NOTE — TELEPHONE ENCOUNTER
Call placed about missed consult from today. Unable to leave message.     Analisa ELENAN, RN  Heart Failure Navigator

## 2024-03-25 ENCOUNTER — TELEPHONE (OUTPATIENT)
Dept: SURGERY | Age: 59
End: 2024-03-25

## 2024-03-25 NOTE — TELEPHONE ENCOUNTER
Pt canceled hospital follow up with Dr. Caruso 3/26/24. Pt reports he has an appt with nephrology and will call back to reschedule. Electronically signed by Sudha Bullock MA on 3/25/2024 at 9:00 AM

## 2024-05-07 ENCOUNTER — APPOINTMENT (OUTPATIENT)
Dept: GENERAL RADIOLOGY | Age: 59
DRG: 377 | End: 2024-05-07
Payer: MEDICARE

## 2024-05-07 ENCOUNTER — HOSPITAL ENCOUNTER (INPATIENT)
Age: 59
LOS: 12 days | Discharge: HOME | DRG: 377 | End: 2024-05-19
Attending: STUDENT IN AN ORGANIZED HEALTH CARE EDUCATION/TRAINING PROGRAM | Admitting: INTERNAL MEDICINE
Payer: MEDICARE

## 2024-05-07 ENCOUNTER — APPOINTMENT (OUTPATIENT)
Dept: CT IMAGING | Age: 59
DRG: 377 | End: 2024-05-07
Payer: MEDICARE

## 2024-05-07 DIAGNOSIS — D64.9 ANEMIA, UNSPECIFIED TYPE: ICD-10-CM

## 2024-05-07 DIAGNOSIS — F10.930 ALCOHOL WITHDRAWAL SYNDROME WITHOUT COMPLICATION (HCC): ICD-10-CM

## 2024-05-07 DIAGNOSIS — I38 VHD (VALVULAR HEART DISEASE): ICD-10-CM

## 2024-05-07 DIAGNOSIS — R79.1 SUPRATHERAPEUTIC INR: ICD-10-CM

## 2024-05-07 DIAGNOSIS — R07.9 CHEST PAIN, UNSPECIFIED TYPE: Primary | ICD-10-CM

## 2024-05-07 PROBLEM — K70.9 ALCOHOLIC LIVER DISEASE, UNSPECIFIED (HCC): Status: ACTIVE | Noted: 2024-05-07

## 2024-05-07 PROBLEM — D69.6 THROMBOCYTOPENIA (HCC): Status: ACTIVE | Noted: 2024-05-07

## 2024-05-07 LAB
ALBUMIN SERPL-MCNC: 4.2 G/DL (ref 3.5–5.2)
ALP SERPL-CCNC: 186 U/L (ref 40–129)
ALT SERPL-CCNC: 13 U/L (ref 0–40)
AMPHET UR QL SCN: NEGATIVE
ANION GAP SERPL CALCULATED.3IONS-SCNC: 14 MMOL/L (ref 7–16)
AST SERPL-CCNC: 31 U/L (ref 0–39)
BARBITURATES UR QL SCN: NEGATIVE
BASOPHILS # BLD: 0.05 K/UL (ref 0–0.2)
BASOPHILS NFR BLD: 1 % (ref 0–2)
BENZODIAZ UR QL: NEGATIVE
BILIRUB SERPL-MCNC: 1 MG/DL (ref 0–1.2)
BNP SERPL-MCNC: 829 PG/ML (ref 0–125)
BUN SERPL-MCNC: 16 MG/DL (ref 6–20)
BUPRENORPHINE UR QL: NEGATIVE
CALCIUM SERPL-MCNC: 8.2 MG/DL (ref 8.6–10.2)
CANNABINOIDS UR QL SCN: NEGATIVE
CHLORIDE SERPL-SCNC: 102 MMOL/L (ref 98–107)
CO2 SERPL-SCNC: 22 MMOL/L (ref 22–29)
COCAINE UR QL SCN: NEGATIVE
CREAT SERPL-MCNC: 1.4 MG/DL (ref 0.7–1.2)
D DIMER: <200 NG/ML DDU (ref 0–230)
EOSINOPHIL # BLD: 0.04 K/UL (ref 0.05–0.5)
EOSINOPHILS RELATIVE PERCENT: 1 % (ref 0–6)
ERYTHROCYTE [DISTWIDTH] IN BLOOD BY AUTOMATED COUNT: 17.7 % (ref 11.5–15)
FENTANYL UR QL: NEGATIVE
GFR, ESTIMATED: 58 ML/MIN/1.73M2
GLUCOSE SERPL-MCNC: 88 MG/DL (ref 74–99)
HCT VFR BLD AUTO: 30.7 % (ref 37–54)
HCT VFR BLD AUTO: 31.7 % (ref 37–54)
HGB BLD-MCNC: 9.1 G/DL (ref 12.5–16.5)
HGB BLD-MCNC: 9.4 G/DL (ref 12.5–16.5)
IMM GRANULOCYTES # BLD AUTO: <0.03 K/UL (ref 0–0.58)
IMM GRANULOCYTES NFR BLD: 0 % (ref 0–5)
INR PPP: >10
LACTATE BLDV-SCNC: 1.1 MMOL/L (ref 0.5–2.2)
LACTATE BLDV-SCNC: 2.7 MMOL/L (ref 0.5–2.2)
LIPASE SERPL-CCNC: 20 U/L (ref 13–60)
LYMPHOCYTES NFR BLD: 0.83 K/UL (ref 1.5–4)
LYMPHOCYTES RELATIVE PERCENT: 18 % (ref 20–42)
MCH RBC QN AUTO: 25.5 PG (ref 26–35)
MCHC RBC AUTO-ENTMCNC: 29.7 G/DL (ref 32–34.5)
MCV RBC AUTO: 85.9 FL (ref 80–99.9)
METHADONE UR QL: NEGATIVE
MONOCYTES NFR BLD: 0.49 K/UL (ref 0.1–0.95)
MONOCYTES NFR BLD: 10 % (ref 2–12)
NEUTROPHILS NFR BLD: 70 % (ref 43–80)
NEUTS SEG NFR BLD: 3.26 K/UL (ref 1.8–7.3)
OPIATES UR QL SCN: NEGATIVE
OXYCODONE UR QL SCN: NEGATIVE
PCP UR QL SCN: NEGATIVE
PLATELET # BLD AUTO: 122 K/UL (ref 130–450)
PMV BLD AUTO: 11.5 FL (ref 7–12)
POTASSIUM SERPL-SCNC: 3.8 MMOL/L (ref 3.5–5)
PROT SERPL-MCNC: 7.2 G/DL (ref 6.4–8.3)
PROTHROMBIN TIME: >120 SEC (ref 9.3–12.4)
RBC # BLD AUTO: 3.69 M/UL (ref 3.8–5.8)
SODIUM SERPL-SCNC: 138 MMOL/L (ref 132–146)
TEST INFORMATION: NORMAL
TROPONIN I SERPL HS-MCNC: 46 NG/L (ref 0–11)
TROPONIN I SERPL HS-MCNC: 54 NG/L (ref 0–11)
WBC OTHER # BLD: 4.7 K/UL (ref 4.5–11.5)

## 2024-05-07 PROCEDURE — 6360000002 HC RX W HCPCS: Performed by: INTERNAL MEDICINE

## 2024-05-07 PROCEDURE — 83880 ASSAY OF NATRIURETIC PEPTIDE: CPT

## 2024-05-07 PROCEDURE — 80307 DRUG TEST PRSMV CHEM ANLYZR: CPT

## 2024-05-07 PROCEDURE — 85014 HEMATOCRIT: CPT

## 2024-05-07 PROCEDURE — 6370000000 HC RX 637 (ALT 250 FOR IP): Performed by: STUDENT IN AN ORGANIZED HEALTH CARE EDUCATION/TRAINING PROGRAM

## 2024-05-07 PROCEDURE — 2060000000 HC ICU INTERMEDIATE R&B

## 2024-05-07 PROCEDURE — 6360000002 HC RX W HCPCS: Performed by: STUDENT IN AN ORGANIZED HEALTH CARE EDUCATION/TRAINING PROGRAM

## 2024-05-07 PROCEDURE — 83605 ASSAY OF LACTIC ACID: CPT

## 2024-05-07 PROCEDURE — 85025 COMPLETE CBC W/AUTO DIFF WBC: CPT

## 2024-05-07 PROCEDURE — 2580000003 HC RX 258: Performed by: INTERNAL MEDICINE

## 2024-05-07 PROCEDURE — A4216 STERILE WATER/SALINE, 10 ML: HCPCS | Performed by: INTERNAL MEDICINE

## 2024-05-07 PROCEDURE — 80053 COMPREHEN METABOLIC PANEL: CPT

## 2024-05-07 PROCEDURE — 71045 X-RAY EXAM CHEST 1 VIEW: CPT

## 2024-05-07 PROCEDURE — 2700000000 HC OXYGEN THERAPY PER DAY

## 2024-05-07 PROCEDURE — 85018 HEMOGLOBIN: CPT

## 2024-05-07 PROCEDURE — 85379 FIBRIN DEGRADATION QUANT: CPT

## 2024-05-07 PROCEDURE — 93005 ELECTROCARDIOGRAM TRACING: CPT | Performed by: STUDENT IN AN ORGANIZED HEALTH CARE EDUCATION/TRAINING PROGRAM

## 2024-05-07 PROCEDURE — 6370000000 HC RX 637 (ALT 250 FOR IP): Performed by: INTERNAL MEDICINE

## 2024-05-07 PROCEDURE — 70450 CT HEAD/BRAIN W/O DYE: CPT

## 2024-05-07 PROCEDURE — APPSS45 APP SPLIT SHARED TIME 31-45 MINUTES: Performed by: NURSE PRACTITIONER

## 2024-05-07 PROCEDURE — HZ2ZZZZ DETOXIFICATION SERVICES FOR SUBSTANCE ABUSE TREATMENT: ICD-10-PCS | Performed by: INTERNAL MEDICINE

## 2024-05-07 PROCEDURE — 99223 1ST HOSP IP/OBS HIGH 75: CPT | Performed by: INTERNAL MEDICINE

## 2024-05-07 PROCEDURE — 99285 EMERGENCY DEPT VISIT HI MDM: CPT

## 2024-05-07 PROCEDURE — 96376 TX/PRO/DX INJ SAME DRUG ADON: CPT

## 2024-05-07 PROCEDURE — 84484 ASSAY OF TROPONIN QUANT: CPT

## 2024-05-07 PROCEDURE — 96374 THER/PROPH/DIAG INJ IV PUSH: CPT

## 2024-05-07 PROCEDURE — 83690 ASSAY OF LIPASE: CPT

## 2024-05-07 PROCEDURE — 85610 PROTHROMBIN TIME: CPT

## 2024-05-07 PROCEDURE — 2580000003 HC RX 258: Performed by: STUDENT IN AN ORGANIZED HEALTH CARE EDUCATION/TRAINING PROGRAM

## 2024-05-07 PROCEDURE — C9113 INJ PANTOPRAZOLE SODIUM, VIA: HCPCS | Performed by: INTERNAL MEDICINE

## 2024-05-07 RX ORDER — MAGNESIUM SULFATE IN WATER 40 MG/ML
2000 INJECTION, SOLUTION INTRAVENOUS PRN
Status: DISCONTINUED | OUTPATIENT
Start: 2024-05-07 | End: 2024-05-19 | Stop reason: HOSPADM

## 2024-05-07 RX ORDER — SODIUM CHLORIDE 0.9 % (FLUSH) 0.9 %
5-40 SYRINGE (ML) INJECTION PRN
Status: DISCONTINUED | OUTPATIENT
Start: 2024-05-07 | End: 2024-05-07 | Stop reason: SDUPTHER

## 2024-05-07 RX ORDER — SODIUM CHLORIDE 9 MG/ML
INJECTION, SOLUTION INTRAVENOUS PRN
Status: DISCONTINUED | OUTPATIENT
Start: 2024-05-07 | End: 2024-05-19 | Stop reason: HOSPADM

## 2024-05-07 RX ORDER — ACETAMINOPHEN 650 MG/1
650 SUPPOSITORY RECTAL EVERY 6 HOURS PRN
Status: DISCONTINUED | OUTPATIENT
Start: 2024-05-07 | End: 2024-05-19 | Stop reason: HOSPADM

## 2024-05-07 RX ORDER — LORAZEPAM 2 MG/ML
1 INJECTION INTRAMUSCULAR
Status: DISCONTINUED | OUTPATIENT
Start: 2024-05-07 | End: 2024-05-15

## 2024-05-07 RX ORDER — SODIUM CHLORIDE 0.9 % (FLUSH) 0.9 %
5-40 SYRINGE (ML) INJECTION PRN
Status: DISCONTINUED | OUTPATIENT
Start: 2024-05-07 | End: 2024-05-19 | Stop reason: HOSPADM

## 2024-05-07 RX ORDER — LORAZEPAM 1 MG/1
2 TABLET ORAL
Status: DISCONTINUED | OUTPATIENT
Start: 2024-05-07 | End: 2024-05-15

## 2024-05-07 RX ORDER — LANOLIN ALCOHOL/MO/W.PET/CERES
100 CREAM (GRAM) TOPICAL DAILY
Status: DISCONTINUED | OUTPATIENT
Start: 2024-05-07 | End: 2024-05-19 | Stop reason: HOSPADM

## 2024-05-07 RX ORDER — METOPROLOL SUCCINATE 25 MG/1
25 TABLET, EXTENDED RELEASE ORAL 2 TIMES DAILY
Status: DISCONTINUED | OUTPATIENT
Start: 2024-05-07 | End: 2024-05-19 | Stop reason: HOSPADM

## 2024-05-07 RX ORDER — ONDANSETRON 4 MG/1
4 TABLET, ORALLY DISINTEGRATING ORAL EVERY 8 HOURS PRN
Status: DISCONTINUED | OUTPATIENT
Start: 2024-05-07 | End: 2024-05-07 | Stop reason: CLARIF

## 2024-05-07 RX ORDER — ACETAMINOPHEN 325 MG/1
650 TABLET ORAL EVERY 6 HOURS PRN
Status: DISCONTINUED | OUTPATIENT
Start: 2024-05-07 | End: 2024-05-19 | Stop reason: HOSPADM

## 2024-05-07 RX ORDER — LORAZEPAM 1 MG/1
3 TABLET ORAL
Status: DISCONTINUED | OUTPATIENT
Start: 2024-05-07 | End: 2024-05-15

## 2024-05-07 RX ORDER — ONDANSETRON 2 MG/ML
INJECTION INTRAMUSCULAR; INTRAVENOUS
Status: DISPENSED
Start: 2024-05-07 | End: 2024-05-07

## 2024-05-07 RX ORDER — PROCHLORPERAZINE MALEATE 10 MG
10 TABLET ORAL EVERY 8 HOURS PRN
Status: DISCONTINUED | OUTPATIENT
Start: 2024-05-07 | End: 2024-05-19 | Stop reason: HOSPADM

## 2024-05-07 RX ORDER — ATORVASTATIN CALCIUM 20 MG/1
20 TABLET, FILM COATED ORAL NIGHTLY
Status: DISCONTINUED | OUTPATIENT
Start: 2024-05-07 | End: 2024-05-19 | Stop reason: HOSPADM

## 2024-05-07 RX ORDER — BUMETANIDE 1 MG/1
2 TABLET ORAL DAILY
Status: DISCONTINUED | OUTPATIENT
Start: 2024-05-07 | End: 2024-05-19 | Stop reason: HOSPADM

## 2024-05-07 RX ORDER — POTASSIUM CHLORIDE 7.45 MG/ML
10 INJECTION INTRAVENOUS PRN
Status: DISCONTINUED | OUTPATIENT
Start: 2024-05-07 | End: 2024-05-19 | Stop reason: HOSPADM

## 2024-05-07 RX ORDER — ONDANSETRON 2 MG/ML
4 INJECTION INTRAMUSCULAR; INTRAVENOUS ONCE
Status: COMPLETED | OUTPATIENT
Start: 2024-05-07 | End: 2024-05-07

## 2024-05-07 RX ORDER — SODIUM CHLORIDE 0.9 % (FLUSH) 0.9 %
5-40 SYRINGE (ML) INJECTION EVERY 12 HOURS SCHEDULED
Status: DISCONTINUED | OUTPATIENT
Start: 2024-05-07 | End: 2024-05-19 | Stop reason: HOSPADM

## 2024-05-07 RX ORDER — TRAZODONE HYDROCHLORIDE 50 MG/1
50 TABLET ORAL NIGHTLY
Status: DISCONTINUED | OUTPATIENT
Start: 2024-05-07 | End: 2024-05-13

## 2024-05-07 RX ORDER — LORAZEPAM 2 MG/ML
4 INJECTION INTRAMUSCULAR
Status: DISCONTINUED | OUTPATIENT
Start: 2024-05-07 | End: 2024-05-15

## 2024-05-07 RX ORDER — LORAZEPAM 2 MG/ML
3 INJECTION INTRAMUSCULAR
Status: DISCONTINUED | OUTPATIENT
Start: 2024-05-07 | End: 2024-05-15

## 2024-05-07 RX ORDER — SODIUM CHLORIDE 9 MG/ML
INJECTION, SOLUTION INTRAVENOUS PRN
Status: DISCONTINUED | OUTPATIENT
Start: 2024-05-07 | End: 2024-05-07 | Stop reason: SDUPTHER

## 2024-05-07 RX ORDER — SODIUM CHLORIDE 0.9 % (FLUSH) 0.9 %
5-40 SYRINGE (ML) INJECTION EVERY 12 HOURS SCHEDULED
Status: DISCONTINUED | OUTPATIENT
Start: 2024-05-07 | End: 2024-05-07 | Stop reason: SDUPTHER

## 2024-05-07 RX ORDER — KETAMINE HYDROCHLORIDE 10 MG/ML
1 INJECTION, SOLUTION INTRAMUSCULAR; INTRAVENOUS ONCE
Status: DISCONTINUED | OUTPATIENT
Start: 2024-05-07 | End: 2024-05-07

## 2024-05-07 RX ORDER — POLYETHYLENE GLYCOL 3350 17 G/17G
17 POWDER, FOR SOLUTION ORAL DAILY PRN
Status: DISCONTINUED | OUTPATIENT
Start: 2024-05-07 | End: 2024-05-19 | Stop reason: HOSPADM

## 2024-05-07 RX ORDER — ONDANSETRON 2 MG/ML
4 INJECTION INTRAMUSCULAR; INTRAVENOUS EVERY 6 HOURS PRN
Status: DISCONTINUED | OUTPATIENT
Start: 2024-05-07 | End: 2024-05-07 | Stop reason: CLARIF

## 2024-05-07 RX ORDER — 0.9 % SODIUM CHLORIDE 0.9 %
500 INTRAVENOUS SOLUTION INTRAVENOUS ONCE
Status: COMPLETED | OUTPATIENT
Start: 2024-05-07 | End: 2024-05-07

## 2024-05-07 RX ORDER — LORAZEPAM 1 MG/1
4 TABLET ORAL
Status: DISCONTINUED | OUTPATIENT
Start: 2024-05-07 | End: 2024-05-15

## 2024-05-07 RX ORDER — PROCHLORPERAZINE EDISYLATE 5 MG/ML
10 INJECTION INTRAMUSCULAR; INTRAVENOUS EVERY 6 HOURS PRN
Status: DISCONTINUED | OUTPATIENT
Start: 2024-05-07 | End: 2024-05-19 | Stop reason: HOSPADM

## 2024-05-07 RX ORDER — POTASSIUM CHLORIDE 20 MEQ/1
40 TABLET, EXTENDED RELEASE ORAL PRN
Status: DISCONTINUED | OUTPATIENT
Start: 2024-05-07 | End: 2024-05-19 | Stop reason: HOSPADM

## 2024-05-07 RX ORDER — LORAZEPAM 1 MG/1
1 TABLET ORAL
Status: DISCONTINUED | OUTPATIENT
Start: 2024-05-07 | End: 2024-05-15

## 2024-05-07 RX ORDER — TRAZODONE HYDROCHLORIDE 50 MG/1
50 TABLET ORAL NIGHTLY
Status: ON HOLD | COMMUNITY
End: 2024-05-19 | Stop reason: HOSPADM

## 2024-05-07 RX ORDER — LORAZEPAM 2 MG/ML
2 INJECTION INTRAMUSCULAR
Status: DISCONTINUED | OUTPATIENT
Start: 2024-05-07 | End: 2024-05-15

## 2024-05-07 RX ORDER — SPIRONOLACTONE 25 MG/1
25 TABLET ORAL DAILY
Status: DISCONTINUED | OUTPATIENT
Start: 2024-05-07 | End: 2024-05-19 | Stop reason: HOSPADM

## 2024-05-07 RX ADMIN — METOPROLOL SUCCINATE 25 MG: 25 TABLET, FILM COATED, EXTENDED RELEASE ORAL at 21:30

## 2024-05-07 RX ADMIN — ATORVASTATIN CALCIUM 20 MG: 20 TABLET, FILM COATED ORAL at 21:30

## 2024-05-07 RX ADMIN — LORAZEPAM 2 MG: 2 INJECTION INTRAMUSCULAR; INTRAVENOUS at 17:43

## 2024-05-07 RX ADMIN — OCTREOTIDE ACETATE 25 MCG/HR: 500 INJECTION, SOLUTION INTRAVENOUS; SUBCUTANEOUS at 22:46

## 2024-05-07 RX ADMIN — ONDANSETRON 4 MG: 2 INJECTION INTRAMUSCULAR; INTRAVENOUS at 09:33

## 2024-05-07 RX ADMIN — Medication 100 MG: at 09:39

## 2024-05-07 RX ADMIN — PHYTONADIONE 5 MG: 10 INJECTION, EMULSION INTRAMUSCULAR; INTRAVENOUS; SUBCUTANEOUS at 21:44

## 2024-05-07 RX ADMIN — SPIRONOLACTONE 25 MG: 25 TABLET ORAL at 21:30

## 2024-05-07 RX ADMIN — PANTOPRAZOLE SODIUM 40 MG: 40 INJECTION, POWDER, FOR SOLUTION INTRAVENOUS at 21:33

## 2024-05-07 RX ADMIN — LORAZEPAM 4 MG: 2 INJECTION INTRAMUSCULAR; INTRAVENOUS at 21:38

## 2024-05-07 RX ADMIN — TRAZODONE HYDROCHLORIDE 50 MG: 50 TABLET ORAL at 21:30

## 2024-05-07 RX ADMIN — LORAZEPAM 4 MG: 2 INJECTION INTRAMUSCULAR; INTRAVENOUS at 09:33

## 2024-05-07 RX ADMIN — SODIUM CHLORIDE, PRESERVATIVE FREE 10 ML: 5 INJECTION INTRAVENOUS at 09:34

## 2024-05-07 RX ADMIN — SODIUM CHLORIDE 500 ML: 9 INJECTION, SOLUTION INTRAVENOUS at 13:46

## 2024-05-07 RX ADMIN — BUMETANIDE 2 MG: 1 TABLET ORAL at 21:30

## 2024-05-07 RX ADMIN — LORAZEPAM 1 MG: 2 INJECTION INTRAMUSCULAR; INTRAVENOUS at 13:47

## 2024-05-07 ASSESSMENT — LIFESTYLE VARIABLES
HOW OFTEN DO YOU HAVE A DRINK CONTAINING ALCOHOL: 4 OR MORE TIMES A WEEK
HOW MANY STANDARD DRINKS CONTAINING ALCOHOL DO YOU HAVE ON A TYPICAL DAY: 5 OR 6

## 2024-05-07 ASSESSMENT — PAIN SCALES - GENERAL
PAINLEVEL_OUTOF10: 0
PAINLEVEL_OUTOF10: 6

## 2024-05-07 ASSESSMENT — PAIN - FUNCTIONAL ASSESSMENT: PAIN_FUNCTIONAL_ASSESSMENT: 0-10

## 2024-05-07 NOTE — ED PROVIDER NOTES
diagnosis and prognosis.  Their questions are answered at this time and they are agreeable with the plan of admission.    --------------------------------- ADDITIONAL PROVIDER NOTES ---------------------------------  Consultations:  Spoke with Dr. Lebron.  Discussed case.  They will admit the patient.  This patient's ED course included: a personal history and physicial examination, re-evaluation prior to disposition, multiple bedside re-evaluations, IV medications, cardiac monitoring, continuous pulse oximetry, and complex medical decision making and emergency management    This patient has remained hemodynamically stable and been closely monitored during their ED course.    Please note that the withdrawal or failure to initiate urgent interventions for this patient would likely result in a life threatening deterioration or permanent disability.      Accordingly this patient received 30 minutes of critical care time, excluding separately billable procedures.      Diagnosis:  1. Chest pain, unspecified type    2. Supratherapeutic INR    3. Alcohol withdrawal syndrome without complication (HCC)    4. VHD (valvular heart disease)    5. Anemia, unspecified type        Disposition:  Patient's disposition: Admit to telemetry  Patient's condition is stable.    Please note that the above documentation was prepared using voice recognition software. Every attempt was made to ensure accuracy but there may be spelling, grammatical, and contextual errors.       Jeffry Kirby,   05/09/24 6953

## 2024-05-07 NOTE — ED NOTES
ED to Inpatient Handoff Report    Notified floor that electronic handoff available and patient ready for transport to room 644.    Safety Risks: None identified    Patient in Restraints: no    Constant Observer or Patient : no    Telemetry Monitoring Ordered: No          Order to transfer to unit without monitor: NO    Last MEWS: 2 Time completed: 1925    Deterioration Index: 43.93    Vitals:    05/07/24 1600 05/07/24 1700 05/07/24 1741 05/07/24 1925   BP: (!) 152/91 (!) 179/117 (!) 179/117 (!) 147/78   Pulse: (!) 103 (!) 104 (!) 102 (!) 104   Resp:    20   Temp:    98.3 °F (36.8 °C)   TempSrc:    Oral   SpO2:    100%   Weight:       Height:           Opportunity for questions and clarification was provided.

## 2024-05-07 NOTE — CARE COORDINATION
[facility] Support Note    Name: Len Dorsey  Date: 5/7/2024    Chief Complaint   Patient presents with    Chest Pain     Started last night, hasn't taken coumadin in 3 days..     Alcohol Problem     Last drink last night, states drinks 4  24oz blue juice each day. No food for 3 days. 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        Peer Support met with patient.  [x] Support and education provided  [] Resources provided   [] Treatment referral:   [x] Other: Left Peer contact information  [] Patient declined peer recovery services     Referred By:    Notes: Met briefly with patient who presented to the ED for chest pain. It was also noted that he was having struggles with alcohol, therefore Peer went to offer support and see if there was an interest in treatment. Patient was receptive and claims he does wish to stop drinking. Once patient is medically stable, Peer will follow up to explore possible treatment options with patient. Support was given and contact information was left with patient belongings. Peer can be contacted in the meantime if any further assistance is required.    Signed: Elena Branch, 5/7/2024      [phone]

## 2024-05-08 ENCOUNTER — APPOINTMENT (OUTPATIENT)
Dept: ULTRASOUND IMAGING | Age: 59
DRG: 377 | End: 2024-05-08
Payer: MEDICARE

## 2024-05-08 ENCOUNTER — APPOINTMENT (OUTPATIENT)
Dept: CT IMAGING | Age: 59
DRG: 377 | End: 2024-05-08
Payer: MEDICARE

## 2024-05-08 LAB
ALBUMIN SERPL-MCNC: 3.7 G/DL (ref 3.5–5.2)
ALBUMIN SERPL-MCNC: 3.8 G/DL (ref 3.5–5.2)
ALP SERPL-CCNC: 178 U/L (ref 40–129)
ALP SERPL-CCNC: 186 U/L (ref 40–129)
ALT SERPL-CCNC: 10 U/L (ref 0–40)
ALT SERPL-CCNC: 9 U/L (ref 0–40)
AMMONIA PLAS-SCNC: 62 UMOL/L (ref 16–60)
ANION GAP SERPL CALCULATED.3IONS-SCNC: 8 MMOL/L (ref 7–16)
ANION GAP SERPL CALCULATED.3IONS-SCNC: 9 MMOL/L (ref 7–16)
APAP SERPL-MCNC: 5 UG/ML (ref 10–30)
AST SERPL-CCNC: 15 U/L (ref 0–39)
AST SERPL-CCNC: 19 U/L (ref 0–39)
ATYPICAL LYMPHOCYTE ABSOLUTE COUNT: 0.04 K/UL (ref 0–0.46)
ATYPICAL LYMPHOCYTES: 1 % (ref 0–4)
BASOPHILS # BLD: 0 K/UL (ref 0–0.2)
BASOPHILS # BLD: 0.04 K/UL (ref 0–0.2)
BASOPHILS NFR BLD: 0 % (ref 0–2)
BASOPHILS NFR BLD: 1 % (ref 0–2)
BILIRUB SERPL-MCNC: 1.4 MG/DL (ref 0–1.2)
BILIRUB SERPL-MCNC: 2.1 MG/DL (ref 0–1.2)
BUN SERPL-MCNC: 18 MG/DL (ref 6–20)
BUN SERPL-MCNC: 20 MG/DL (ref 6–20)
CALCIUM SERPL-MCNC: 8 MG/DL (ref 8.6–10.2)
CALCIUM SERPL-MCNC: 8.4 MG/DL (ref 8.6–10.2)
CHLORIDE SERPL-SCNC: 100 MMOL/L (ref 98–107)
CHLORIDE SERPL-SCNC: 105 MMOL/L (ref 98–107)
CHOLEST SERPL-MCNC: 122 MG/DL
CO2 SERPL-SCNC: 24 MMOL/L (ref 22–29)
CO2 SERPL-SCNC: 25 MMOL/L (ref 22–29)
CREAT SERPL-MCNC: 1.5 MG/DL (ref 0.7–1.2)
CREAT SERPL-MCNC: 1.7 MG/DL (ref 0.7–1.2)
EKG ATRIAL RATE: 97 BPM
EKG P AXIS: 90 DEGREES
EKG P-R INTERVAL: 166 MS
EKG Q-T INTERVAL: 434 MS
EKG QRS DURATION: 156 MS
EKG QTC CALCULATION (BAZETT): 551 MS
EKG R AXIS: -47 DEGREES
EKG T AXIS: 108 DEGREES
EKG VENTRICULAR RATE: 97 BPM
EOSINOPHIL # BLD: 0.04 K/UL (ref 0.05–0.5)
EOSINOPHIL # BLD: 0.09 K/UL (ref 0.05–0.5)
EOSINOPHILS RELATIVE PERCENT: 1 % (ref 0–6)
EOSINOPHILS RELATIVE PERCENT: 2 % (ref 0–6)
ERYTHROCYTE [DISTWIDTH] IN BLOOD BY AUTOMATED COUNT: 17.3 % (ref 11.5–15)
ERYTHROCYTE [DISTWIDTH] IN BLOOD BY AUTOMATED COUNT: 17.7 % (ref 11.5–15)
ETHANOLAMINE SERPL-MCNC: <10 MG/DL
FERRITIN SERPL-MCNC: 61 NG/ML
FOLATE SERPL-MCNC: <2 NG/ML (ref 4.8–24.2)
GFR, ESTIMATED: 46 ML/MIN/1.73M2
GFR, ESTIMATED: 56 ML/MIN/1.73M2
GLUCOSE SERPL-MCNC: 119 MG/DL (ref 74–99)
GLUCOSE SERPL-MCNC: 134 MG/DL (ref 74–99)
HBA1C MFR BLD: 4.9 % (ref 4–5.6)
HCT VFR BLD AUTO: 29.9 % (ref 37–54)
HCT VFR BLD AUTO: 30.2 % (ref 37–54)
HCT VFR BLD AUTO: 30.3 % (ref 37–54)
HCT VFR BLD AUTO: NORMAL % (ref 40.7–50.3)
HCT VFR BLD AUTO: NORMAL % (ref 40.7–50.3)
HDLC SERPL-MCNC: 48 MG/DL
HGB BLD-MCNC: 8.6 G/DL (ref 12.5–16.5)
HGB BLD-MCNC: 8.8 G/DL (ref 12.5–16.5)
HGB BLD-MCNC: 8.9 G/DL (ref 12.5–16.5)
HGB BLD-MCNC: NORMAL G/DL (ref 13–17)
HGB BLD-MCNC: NORMAL G/DL (ref 13–17)
INR PPP: 4.7
IRON SATN MFR SERPL: 68 % (ref 20–55)
IRON SERPL-MCNC: 251 UG/DL (ref 59–158)
LDLC SERPL CALC-MCNC: 60 MG/DL
LYMPHOCYTES NFR BLD: 0.4 K/UL (ref 1.5–4)
LYMPHOCYTES NFR BLD: 0.52 K/UL (ref 1.5–4)
LYMPHOCYTES RELATIVE PERCENT: 10 % (ref 20–42)
LYMPHOCYTES RELATIVE PERCENT: 8 % (ref 20–42)
MAGNESIUM SERPL-MCNC: 1.4 MG/DL (ref 1.6–2.6)
MCH RBC QN AUTO: 25.6 PG (ref 26–35)
MCH RBC QN AUTO: 25.7 PG (ref 26–35)
MCHC RBC AUTO-ENTMCNC: 29.1 G/DL (ref 32–34.5)
MCHC RBC AUTO-ENTMCNC: 29.4 G/DL (ref 32–34.5)
MCV RBC AUTO: 87.3 FL (ref 80–99.9)
MCV RBC AUTO: 88 FL (ref 80–99.9)
MONOCYTES NFR BLD: 0.09 K/UL (ref 0.1–0.95)
MONOCYTES NFR BLD: 0.17 K/UL (ref 0.1–0.95)
MONOCYTES NFR BLD: 2 % (ref 2–12)
MONOCYTES NFR BLD: 4 % (ref 2–12)
NEUTROPHILS NFR BLD: 84 % (ref 43–80)
NEUTROPHILS NFR BLD: 88 % (ref 43–80)
NEUTS SEG NFR BLD: 4.22 K/UL (ref 1.8–7.3)
NEUTS SEG NFR BLD: 4.47 K/UL (ref 1.8–7.3)
PLATELET # BLD AUTO: 94 K/UL (ref 130–450)
PLATELET CONFIRMATION: NORMAL
PLATELET CONFIRMATION: NORMAL
PLATELET, FLUORESCENCE: 88 K/UL (ref 130–450)
PMV BLD AUTO: 11.3 FL (ref 7–12)
PMV BLD AUTO: 11.9 FL (ref 7–12)
POTASSIUM SERPL-SCNC: 4.1 MMOL/L (ref 3.5–5)
POTASSIUM SERPL-SCNC: 4.8 MMOL/L (ref 3.5–5)
PROT SERPL-MCNC: 6.4 G/DL (ref 6.4–8.3)
PROT SERPL-MCNC: 6.7 G/DL (ref 6.4–8.3)
PROTHROMBIN TIME: 50.8 SEC (ref 9.3–12.4)
RBC # BLD AUTO: 3.43 M/UL (ref 3.8–5.8)
RBC # BLD AUTO: 3.47 M/UL (ref 3.8–5.8)
RBC # BLD: ABNORMAL 10*6/UL
SODIUM SERPL-SCNC: 134 MMOL/L (ref 132–146)
SODIUM SERPL-SCNC: 137 MMOL/L (ref 132–146)
TIBC SERPL-MCNC: 371 UG/DL (ref 250–450)
TRIGL SERPL-MCNC: 70 MG/DL
TSH SERPL DL<=0.05 MIU/L-ACNC: 0.95 UIU/ML (ref 0.27–4.2)
VIT B12 SERPL-MCNC: 910 PG/ML (ref 211–946)
VLDLC SERPL CALC-MCNC: 14 MG/DL
WBC OTHER # BLD: 5 K/UL (ref 4.5–11.5)
WBC OTHER # BLD: 5.1 K/UL (ref 4.5–11.5)

## 2024-05-08 PROCEDURE — 82728 ASSAY OF FERRITIN: CPT

## 2024-05-08 PROCEDURE — 93975 VASCULAR STUDY: CPT

## 2024-05-08 PROCEDURE — 93010 ELECTROCARDIOGRAM REPORT: CPT | Performed by: INTERNAL MEDICINE

## 2024-05-08 PROCEDURE — 84443 ASSAY THYROID STIM HORMONE: CPT

## 2024-05-08 PROCEDURE — G0480 DRUG TEST DEF 1-7 CLASSES: HCPCS

## 2024-05-08 PROCEDURE — 80143 DRUG ASSAY ACETAMINOPHEN: CPT

## 2024-05-08 PROCEDURE — 83550 IRON BINDING TEST: CPT

## 2024-05-08 PROCEDURE — 86923 COMPATIBILITY TEST ELECTRIC: CPT

## 2024-05-08 PROCEDURE — 85610 PROTHROMBIN TIME: CPT

## 2024-05-08 PROCEDURE — A4216 STERILE WATER/SALINE, 10 ML: HCPCS | Performed by: INTERNAL MEDICINE

## 2024-05-08 PROCEDURE — 6370000000 HC RX 637 (ALT 250 FOR IP): Performed by: INTERNAL MEDICINE

## 2024-05-08 PROCEDURE — 85018 HEMOGLOBIN: CPT

## 2024-05-08 PROCEDURE — 86850 RBC ANTIBODY SCREEN: CPT

## 2024-05-08 PROCEDURE — 99233 SBSQ HOSP IP/OBS HIGH 50: CPT | Performed by: INTERNAL MEDICINE

## 2024-05-08 PROCEDURE — 74176 CT ABD & PELVIS W/O CONTRAST: CPT

## 2024-05-08 PROCEDURE — C9113 INJ PANTOPRAZOLE SODIUM, VIA: HCPCS | Performed by: INTERNAL MEDICINE

## 2024-05-08 PROCEDURE — 6370000000 HC RX 637 (ALT 250 FOR IP): Performed by: STUDENT IN AN ORGANIZED HEALTH CARE EDUCATION/TRAINING PROGRAM

## 2024-05-08 PROCEDURE — 36415 COLL VENOUS BLD VENIPUNCTURE: CPT

## 2024-05-08 PROCEDURE — 2700000000 HC OXYGEN THERAPY PER DAY

## 2024-05-08 PROCEDURE — 82746 ASSAY OF FOLIC ACID SERUM: CPT

## 2024-05-08 PROCEDURE — 2060000000 HC ICU INTERMEDIATE R&B

## 2024-05-08 PROCEDURE — 86900 BLOOD TYPING SEROLOGIC ABO: CPT

## 2024-05-08 PROCEDURE — 80053 COMPREHEN METABOLIC PANEL: CPT

## 2024-05-08 PROCEDURE — 83735 ASSAY OF MAGNESIUM: CPT

## 2024-05-08 PROCEDURE — 85014 HEMATOCRIT: CPT

## 2024-05-08 PROCEDURE — 6370000000 HC RX 637 (ALT 250 FOR IP): Performed by: HOSPITALIST

## 2024-05-08 PROCEDURE — 83540 ASSAY OF IRON: CPT

## 2024-05-08 PROCEDURE — 80061 LIPID PANEL: CPT

## 2024-05-08 PROCEDURE — 76705 ECHO EXAM OF ABDOMEN: CPT

## 2024-05-08 PROCEDURE — 2580000003 HC RX 258: Performed by: INTERNAL MEDICINE

## 2024-05-08 PROCEDURE — 82105 ALPHA-FETOPROTEIN SERUM: CPT

## 2024-05-08 PROCEDURE — 6360000002 HC RX W HCPCS: Performed by: INTERNAL MEDICINE

## 2024-05-08 PROCEDURE — 85025 COMPLETE CBC W/AUTO DIFF WBC: CPT

## 2024-05-08 PROCEDURE — 82140 ASSAY OF AMMONIA: CPT

## 2024-05-08 PROCEDURE — 82607 VITAMIN B-12: CPT

## 2024-05-08 PROCEDURE — 6360000002 HC RX W HCPCS: Performed by: HOSPITALIST

## 2024-05-08 PROCEDURE — 83036 HEMOGLOBIN GLYCOSYLATED A1C: CPT

## 2024-05-08 PROCEDURE — 6370000000 HC RX 637 (ALT 250 FOR IP): Performed by: CLINICAL NURSE SPECIALIST

## 2024-05-08 PROCEDURE — 86901 BLOOD TYPING SEROLOGIC RH(D): CPT

## 2024-05-08 PROCEDURE — 80074 ACUTE HEPATITIS PANEL: CPT

## 2024-05-08 PROCEDURE — 99223 1ST HOSP IP/OBS HIGH 75: CPT | Performed by: INTERNAL MEDICINE

## 2024-05-08 PROCEDURE — 2580000003 HC RX 258: Performed by: HOSPITALIST

## 2024-05-08 RX ORDER — ISOSORBIDE DINITRATE 10 MG/1
5 TABLET ORAL 3 TIMES DAILY
Status: DISCONTINUED | OUTPATIENT
Start: 2024-05-08 | End: 2024-05-15

## 2024-05-08 RX ORDER — SODIUM CHLORIDE 9 MG/ML
INJECTION, SOLUTION INTRAVENOUS PRN
Status: DISCONTINUED | OUTPATIENT
Start: 2024-05-08 | End: 2024-05-10

## 2024-05-08 RX ORDER — HYDRALAZINE HYDROCHLORIDE 10 MG/1
10 TABLET, FILM COATED ORAL EVERY 8 HOURS SCHEDULED
Status: DISCONTINUED | OUTPATIENT
Start: 2024-05-08 | End: 2024-05-19 | Stop reason: HOSPADM

## 2024-05-08 RX ORDER — LACTULOSE 10 G/15ML
20 SOLUTION ORAL 2 TIMES DAILY
Status: DISCONTINUED | OUTPATIENT
Start: 2024-05-08 | End: 2024-05-10

## 2024-05-08 RX ADMIN — PANTOPRAZOLE SODIUM 40 MG: 40 INJECTION, POWDER, FOR SOLUTION INTRAVENOUS at 17:51

## 2024-05-08 RX ADMIN — RIFAXIMIN 400 MG: 200 TABLET ORAL at 20:20

## 2024-05-08 RX ADMIN — TRAZODONE HYDROCHLORIDE 50 MG: 50 TABLET ORAL at 20:20

## 2024-05-08 RX ADMIN — ACETAMINOPHEN 650 MG: 325 TABLET ORAL at 09:53

## 2024-05-08 RX ADMIN — SPIRONOLACTONE 25 MG: 25 TABLET ORAL at 09:30

## 2024-05-08 RX ADMIN — OCTREOTIDE ACETATE 50 MCG/HR: 500 INJECTION, SOLUTION INTRAVENOUS; SUBCUTANEOUS at 15:30

## 2024-05-08 RX ADMIN — PANTOPRAZOLE SODIUM 40 MG: 40 INJECTION, POWDER, FOR SOLUTION INTRAVENOUS at 09:30

## 2024-05-08 RX ADMIN — METOPROLOL SUCCINATE 25 MG: 25 TABLET, FILM COATED, EXTENDED RELEASE ORAL at 09:30

## 2024-05-08 RX ADMIN — PANTOPRAZOLE SODIUM 40 MG: 40 INJECTION, POWDER, FOR SOLUTION INTRAVENOUS at 22:58

## 2024-05-08 RX ADMIN — METOPROLOL SUCCINATE 25 MG: 25 TABLET, FILM COATED, EXTENDED RELEASE ORAL at 20:21

## 2024-05-08 RX ADMIN — SODIUM CHLORIDE, PRESERVATIVE FREE 10 ML: 5 INJECTION INTRAVENOUS at 09:54

## 2024-05-08 RX ADMIN — LORAZEPAM 2 MG: 1 TABLET ORAL at 22:59

## 2024-05-08 RX ADMIN — ATORVASTATIN CALCIUM 20 MG: 20 TABLET, FILM COATED ORAL at 20:20

## 2024-05-08 RX ADMIN — LORAZEPAM 2 MG: 1 TABLET ORAL at 20:21

## 2024-05-08 RX ADMIN — HYDRALAZINE HYDROCHLORIDE 10 MG: 10 TABLET, FILM COATED ORAL at 15:10

## 2024-05-08 RX ADMIN — PROCHLORPERAZINE EDISYLATE 10 MG: 5 INJECTION INTRAMUSCULAR; INTRAVENOUS at 09:53

## 2024-05-08 RX ADMIN — LACTULOSE 20 G: 20 SOLUTION ORAL at 20:20

## 2024-05-08 RX ADMIN — Medication 100 MG: at 09:30

## 2024-05-08 RX ADMIN — HYDRALAZINE HYDROCHLORIDE 10 MG: 10 TABLET, FILM COATED ORAL at 20:20

## 2024-05-08 RX ADMIN — RIFAXIMIN 400 MG: 200 TABLET ORAL at 15:10

## 2024-05-08 RX ADMIN — ISOSORBIDE DINITRATE 5 MG: 10 TABLET ORAL at 15:10

## 2024-05-08 RX ADMIN — ISOSORBIDE DINITRATE 5 MG: 10 TABLET ORAL at 20:20

## 2024-05-08 RX ADMIN — SODIUM CHLORIDE, PRESERVATIVE FREE 10 ML: 5 INJECTION INTRAVENOUS at 22:58

## 2024-05-08 RX ADMIN — BUMETANIDE 2 MG: 1 TABLET ORAL at 09:31

## 2024-05-08 RX ADMIN — LACTULOSE 20 G: 20 SOLUTION ORAL at 15:10

## 2024-05-08 ASSESSMENT — PAIN - FUNCTIONAL ASSESSMENT: PAIN_FUNCTIONAL_ASSESSMENT: ACTIVITIES ARE NOT PREVENTED

## 2024-05-08 ASSESSMENT — PAIN DESCRIPTION - DESCRIPTORS
DESCRIPTORS: PRESSURE
DESCRIPTORS: ACHING;CRUSHING;GNAWING
DESCRIPTORS: ACHING

## 2024-05-08 ASSESSMENT — PAIN DESCRIPTION - LOCATION
LOCATION: HEAD
LOCATION: ABDOMEN
LOCATION: CHEST

## 2024-05-08 ASSESSMENT — PAIN SCALES - GENERAL
PAINLEVEL_OUTOF10: 6
PAINLEVEL_OUTOF10: 7
PAINLEVEL_OUTOF10: 7

## 2024-05-08 NOTE — CARE COORDINATION
Social Work / Discharge PLanning :SW noted consults for Alcohol Rehab. Elena from [facility] has already met with patient and patient agreeable. She will follow and when more medically stable, can proceed with treatment plan. Patient does have a PCP and insurance. Patient has a consult for Psychiatry as well. Await plan. CIWA scale initiated. SW to follow. Electronically signed by ANJEL Crowe on 5/8/24 at 8:46 AM EDT

## 2024-05-08 NOTE — CARE COORDINATION
[facility] Support Note    Name: Len Dorsey  Date: 5/8/2024    Chief Complaint   Patient presents with    Chest Pain     Started last night, hasn't taken coumadin in 3 days..     Alcohol Problem     Last drink last night, states drinks 4  24oz blue juice each day. No food for 3 days. 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        Peer Support met with patient.  [] Support and education provided  [] Resources provided   [x] Treatment referral: MEDICARE facility   [x] Other: Left Peer contact information  [] Patient declined peer recovery services     Referred By: Bertha (WARREN)    Notes: Visited patient for a short period of time to assess whether plans could be initiated. Patient still somewhat groggy and feeling unwell, however, he did respond \"yes\" when Peer inquired about whether he would go to inpatient treatment to get help with his alcoholism. Peer ensured him that we would find him the help he needs, and will be actively searching for facilities that accept Medicare. Peer will present suggestions to patient tomorrow as long as his health concerns continue to improve. WARREN updated and Peer left contact information as well.    Signed: Elena Branch, 5/8/2024      [phone]

## 2024-05-08 NOTE — ACP (ADVANCE CARE PLANNING)
Advance Care Planning   Healthcare Decision Maker:    Leonie Moreland Domestic Partner [phone]       Click here to complete Healthcare Decision Makers including selection of the Healthcare Decision Maker Relationship (ie \"Primary\").  Today we documented Decision Maker(s) consistent with Legal Next of Kin hierarchy.

## 2024-05-09 ENCOUNTER — ANESTHESIA EVENT (OUTPATIENT)
Dept: ENDOSCOPY | Age: 59
End: 2024-05-09
Payer: MEDICARE

## 2024-05-09 ENCOUNTER — ANESTHESIA (OUTPATIENT)
Dept: ENDOSCOPY | Age: 59
End: 2024-05-09
Payer: MEDICARE

## 2024-05-09 ENCOUNTER — APPOINTMENT (OUTPATIENT)
Age: 59
DRG: 377 | End: 2024-05-09
Payer: MEDICARE

## 2024-05-09 LAB
AMMONIA PLAS-SCNC: 39 UMOL/L (ref 16–60)
ANION GAP SERPL CALCULATED.3IONS-SCNC: 12 MMOL/L (ref 7–16)
BUN SERPL-MCNC: 20 MG/DL (ref 6–20)
CALCIUM SERPL-MCNC: 8.6 MG/DL (ref 8.6–10.2)
CHLORIDE SERPL-SCNC: 100 MMOL/L (ref 98–107)
CO2 SERPL-SCNC: 27 MMOL/L (ref 22–29)
CREAT SERPL-MCNC: 1.8 MG/DL (ref 0.7–1.2)
ERYTHROCYTE [DISTWIDTH] IN BLOOD BY AUTOMATED COUNT: 17.2 % (ref 11.5–15)
GFR, ESTIMATED: 43 ML/MIN/1.73M2
GLUCOSE BLD-MCNC: 106 MG/DL (ref 74–99)
GLUCOSE SERPL-MCNC: 99 MG/DL (ref 74–99)
HAV IGM SERPL QL IA: NONREACTIVE
HBV CORE IGM SERPL QL IA: NONREACTIVE
HBV SURFACE AG SERPL QL IA: NONREACTIVE
HCT VFR BLD AUTO: 30.8 % (ref 37–54)
HCT VFR BLD AUTO: 31.3 % (ref 37–54)
HCT VFR BLD AUTO: 31.4 % (ref 37–54)
HCV AB SERPL QL IA: NONREACTIVE
HGB BLD-MCNC: 9 G/DL (ref 12.5–16.5)
HGB BLD-MCNC: 9.3 G/DL (ref 12.5–16.5)
HGB BLD-MCNC: 9.3 G/DL (ref 12.5–16.5)
INR PPP: 1.5
MAGNESIUM SERPL-MCNC: 1.3 MG/DL (ref 1.6–2.6)
MCH RBC QN AUTO: 25.6 PG (ref 26–35)
MCHC RBC AUTO-ENTMCNC: 29.6 G/DL (ref 32–34.5)
MCV RBC AUTO: 86.5 FL (ref 80–99.9)
PARTIAL THROMBOPLASTIN TIME: 30.3 SEC (ref 24.5–35.1)
PLATELET # BLD AUTO: 108 K/UL (ref 130–450)
PMV BLD AUTO: 10.2 FL (ref 7–12)
POTASSIUM SERPL-SCNC: 4 MMOL/L (ref 3.5–5)
PROTHROMBIN TIME: 17 SEC (ref 9.3–12.4)
RBC # BLD AUTO: 3.63 M/UL (ref 3.8–5.8)
SODIUM SERPL-SCNC: 139 MMOL/L (ref 132–146)
TROPONIN I SERPL HS-MCNC: 38 NG/L (ref 0–11)
WBC OTHER # BLD: 6.2 K/UL (ref 4.5–11.5)

## 2024-05-09 PROCEDURE — 97161 PT EVAL LOW COMPLEX 20 MIN: CPT

## 2024-05-09 PROCEDURE — C9113 INJ PANTOPRAZOLE SODIUM, VIA: HCPCS | Performed by: INTERNAL MEDICINE

## 2024-05-09 PROCEDURE — 82140 ASSAY OF AMMONIA: CPT

## 2024-05-09 PROCEDURE — 3609012400 HC EGD TRANSORAL BIOPSY SINGLE/MULTIPLE: Performed by: INTERNAL MEDICINE

## 2024-05-09 PROCEDURE — 2580000003 HC RX 258: Performed by: HOSPITALIST

## 2024-05-09 PROCEDURE — 84484 ASSAY OF TROPONIN QUANT: CPT

## 2024-05-09 PROCEDURE — 6360000002 HC RX W HCPCS: Performed by: NURSE ANESTHETIST, CERTIFIED REGISTERED

## 2024-05-09 PROCEDURE — 85018 HEMOGLOBIN: CPT

## 2024-05-09 PROCEDURE — 85027 COMPLETE CBC AUTOMATED: CPT

## 2024-05-09 PROCEDURE — 7100000010 HC PHASE II RECOVERY - FIRST 15 MIN: Performed by: INTERNAL MEDICINE

## 2024-05-09 PROCEDURE — 6370000000 HC RX 637 (ALT 250 FOR IP): Performed by: STUDENT IN AN ORGANIZED HEALTH CARE EDUCATION/TRAINING PROGRAM

## 2024-05-09 PROCEDURE — 85730 THROMBOPLASTIN TIME PARTIAL: CPT

## 2024-05-09 PROCEDURE — 82962 GLUCOSE BLOOD TEST: CPT

## 2024-05-09 PROCEDURE — 6370000000 HC RX 637 (ALT 250 FOR IP): Performed by: CLINICAL NURSE SPECIALIST

## 2024-05-09 PROCEDURE — 2700000000 HC OXYGEN THERAPY PER DAY

## 2024-05-09 PROCEDURE — 7100000011 HC PHASE II RECOVERY - ADDTL 15 MIN: Performed by: INTERNAL MEDICINE

## 2024-05-09 PROCEDURE — A4216 STERILE WATER/SALINE, 10 ML: HCPCS | Performed by: INTERNAL MEDICINE

## 2024-05-09 PROCEDURE — 6370000000 HC RX 637 (ALT 250 FOR IP): Performed by: INTERNAL MEDICINE

## 2024-05-09 PROCEDURE — 2580000003 HC RX 258: Performed by: INTERNAL MEDICINE

## 2024-05-09 PROCEDURE — 99233 SBSQ HOSP IP/OBS HIGH 50: CPT | Performed by: STUDENT IN AN ORGANIZED HEALTH CARE EDUCATION/TRAINING PROGRAM

## 2024-05-09 PROCEDURE — 3700000000 HC ANESTHESIA ATTENDED CARE: Performed by: INTERNAL MEDICINE

## 2024-05-09 PROCEDURE — 88342 IMHCHEM/IMCYTCHM 1ST ANTB: CPT

## 2024-05-09 PROCEDURE — 6360000002 HC RX W HCPCS: Performed by: INTERNAL MEDICINE

## 2024-05-09 PROCEDURE — 97530 THERAPEUTIC ACTIVITIES: CPT

## 2024-05-09 PROCEDURE — 85014 HEMATOCRIT: CPT

## 2024-05-09 PROCEDURE — 2060000000 HC ICU INTERMEDIATE R&B

## 2024-05-09 PROCEDURE — 85610 PROTHROMBIN TIME: CPT

## 2024-05-09 PROCEDURE — 2709999900 HC NON-CHARGEABLE SUPPLY: Performed by: INTERNAL MEDICINE

## 2024-05-09 PROCEDURE — 88305 TISSUE EXAM BY PATHOLOGIST: CPT

## 2024-05-09 PROCEDURE — 6360000002 HC RX W HCPCS: Performed by: HOSPITALIST

## 2024-05-09 PROCEDURE — 6370000000 HC RX 637 (ALT 250 FOR IP): Performed by: HOSPITALIST

## 2024-05-09 PROCEDURE — 6360000002 HC RX W HCPCS: Performed by: STUDENT IN AN ORGANIZED HEALTH CARE EDUCATION/TRAINING PROGRAM

## 2024-05-09 PROCEDURE — 36415 COLL VENOUS BLD VENIPUNCTURE: CPT

## 2024-05-09 PROCEDURE — 0DB68ZX EXCISION OF STOMACH, VIA NATURAL OR ARTIFICIAL OPENING ENDOSCOPIC, DIAGNOSTIC: ICD-10-PCS | Performed by: INTERNAL MEDICINE

## 2024-05-09 PROCEDURE — 99233 SBSQ HOSP IP/OBS HIGH 50: CPT | Performed by: INTERNAL MEDICINE

## 2024-05-09 PROCEDURE — 83735 ASSAY OF MAGNESIUM: CPT

## 2024-05-09 PROCEDURE — 80048 BASIC METABOLIC PNL TOTAL CA: CPT

## 2024-05-09 PROCEDURE — 2580000003 HC RX 258: Performed by: NURSE ANESTHETIST, CERTIFIED REGISTERED

## 2024-05-09 RX ORDER — PROPOFOL 10 MG/ML
INJECTION, EMULSION INTRAVENOUS PRN
Status: DISCONTINUED | OUTPATIENT
Start: 2024-05-09 | End: 2024-05-09 | Stop reason: SDUPTHER

## 2024-05-09 RX ORDER — HEPARIN SODIUM 10000 [USP'U]/100ML
5-30 INJECTION, SOLUTION INTRAVENOUS CONTINUOUS
Status: DISCONTINUED | OUTPATIENT
Start: 2024-05-09 | End: 2024-05-17

## 2024-05-09 RX ORDER — PHENYLEPHRINE HCL IN 0.9% NACL 1 MG/10 ML
SYRINGE (ML) INTRAVENOUS PRN
Status: DISCONTINUED | OUTPATIENT
Start: 2024-05-09 | End: 2024-05-09 | Stop reason: SDUPTHER

## 2024-05-09 RX ORDER — HEPARIN SODIUM 1000 [USP'U]/ML
4000 INJECTION, SOLUTION INTRAVENOUS; SUBCUTANEOUS PRN
Status: DISCONTINUED | OUTPATIENT
Start: 2024-05-09 | End: 2024-05-17

## 2024-05-09 RX ORDER — SODIUM CHLORIDE 9 MG/ML
INJECTION, SOLUTION INTRAVENOUS CONTINUOUS PRN
Status: DISCONTINUED | OUTPATIENT
Start: 2024-05-09 | End: 2024-05-09 | Stop reason: SDUPTHER

## 2024-05-09 RX ORDER — PANTOPRAZOLE SODIUM 40 MG/1
40 TABLET, DELAYED RELEASE ORAL
Status: DISCONTINUED | OUTPATIENT
Start: 2024-05-09 | End: 2024-05-10

## 2024-05-09 RX ORDER — HEPARIN SODIUM 1000 [USP'U]/ML
2000 INJECTION, SOLUTION INTRAVENOUS; SUBCUTANEOUS PRN
Status: DISCONTINUED | OUTPATIENT
Start: 2024-05-09 | End: 2024-05-17

## 2024-05-09 RX ORDER — HEPARIN SODIUM 1000 [USP'U]/ML
4000 INJECTION, SOLUTION INTRAVENOUS; SUBCUTANEOUS ONCE
Status: COMPLETED | OUTPATIENT
Start: 2024-05-09 | End: 2024-05-09

## 2024-05-09 RX ADMIN — MAGNESIUM SULFATE HEPTAHYDRATE 2000 MG: 40 INJECTION, SOLUTION INTRAVENOUS at 08:50

## 2024-05-09 RX ADMIN — PANTOPRAZOLE SODIUM 40 MG: 40 TABLET, DELAYED RELEASE ORAL at 16:05

## 2024-05-09 RX ADMIN — METOPROLOL SUCCINATE 25 MG: 25 TABLET, FILM COATED, EXTENDED RELEASE ORAL at 08:46

## 2024-05-09 RX ADMIN — PROPOFOL 200 MG: 10 INJECTION, EMULSION INTRAVENOUS at 14:51

## 2024-05-09 RX ADMIN — OCTREOTIDE ACETATE 50 MCG/HR: 500 INJECTION, SOLUTION INTRAVENOUS; SUBCUTANEOUS at 00:06

## 2024-05-09 RX ADMIN — BUMETANIDE 2 MG: 1 TABLET ORAL at 08:46

## 2024-05-09 RX ADMIN — TRAZODONE HYDROCHLORIDE 50 MG: 50 TABLET ORAL at 20:23

## 2024-05-09 RX ADMIN — Medication 100 MG: at 08:46

## 2024-05-09 RX ADMIN — PANTOPRAZOLE SODIUM 40 MG: 40 INJECTION, POWDER, FOR SOLUTION INTRAVENOUS at 08:46

## 2024-05-09 RX ADMIN — SPIRONOLACTONE 25 MG: 25 TABLET ORAL at 08:46

## 2024-05-09 RX ADMIN — HYDRALAZINE HYDROCHLORIDE 10 MG: 10 TABLET, FILM COATED ORAL at 20:23

## 2024-05-09 RX ADMIN — RIFAXIMIN 400 MG: 200 TABLET ORAL at 08:46

## 2024-05-09 RX ADMIN — PANTOPRAZOLE SODIUM 40 MG: 40 INJECTION, POWDER, FOR SOLUTION INTRAVENOUS at 05:33

## 2024-05-09 RX ADMIN — SODIUM CHLORIDE: 9 INJECTION, SOLUTION INTRAVENOUS at 14:50

## 2024-05-09 RX ADMIN — ISOSORBIDE DINITRATE 5 MG: 10 TABLET ORAL at 20:23

## 2024-05-09 RX ADMIN — HEPARIN SODIUM 9 UNITS/KG/HR: 10000 INJECTION, SOLUTION INTRAVENOUS at 16:38

## 2024-05-09 RX ADMIN — LORAZEPAM 1 MG: 1 TABLET ORAL at 08:54

## 2024-05-09 RX ADMIN — LACTULOSE 20 G: 20 SOLUTION ORAL at 20:23

## 2024-05-09 RX ADMIN — LACTULOSE 20 G: 20 SOLUTION ORAL at 08:46

## 2024-05-09 RX ADMIN — HEPARIN SODIUM 4000 UNITS: 1000 INJECTION INTRAVENOUS; SUBCUTANEOUS at 16:32

## 2024-05-09 RX ADMIN — HEPARIN SODIUM 9 UNITS/KG/HR: 10000 INJECTION, SOLUTION INTRAVENOUS at 18:05

## 2024-05-09 RX ADMIN — SODIUM CHLORIDE, PRESERVATIVE FREE 10 ML: 5 INJECTION INTRAVENOUS at 08:47

## 2024-05-09 RX ADMIN — ISOSORBIDE DINITRATE 5 MG: 10 TABLET ORAL at 08:46

## 2024-05-09 RX ADMIN — Medication 100 MCG: at 14:58

## 2024-05-09 RX ADMIN — MAGNESIUM SULFATE HEPTAHYDRATE 2000 MG: 40 INJECTION, SOLUTION INTRAVENOUS at 11:03

## 2024-05-09 RX ADMIN — ATORVASTATIN CALCIUM 20 MG: 20 TABLET, FILM COATED ORAL at 20:23

## 2024-05-09 RX ADMIN — METOPROLOL SUCCINATE 25 MG: 25 TABLET, FILM COATED, EXTENDED RELEASE ORAL at 20:23

## 2024-05-09 RX ADMIN — Medication 100 MCG: at 14:56

## 2024-05-09 RX ADMIN — RIFAXIMIN 400 MG: 200 TABLET ORAL at 20:23

## 2024-05-09 ASSESSMENT — PAIN SCALES - GENERAL
PAINLEVEL_OUTOF10: 0
PAINLEVEL_OUTOF10: 0

## 2024-05-09 ASSESSMENT — LIFESTYLE VARIABLES: SMOKING_STATUS: 1

## 2024-05-09 NOTE — DISCHARGE INSTRUCTIONS
weight gain of 3 pounds or more in 1 day         OR 5 pounds or more in one week  More shortness of breath  More swelling of your stomach, legs, ankles or feet  Feeling more tired, No energy  Dry hacky cough  Dizziness  More chest pain / discomfort       (CALL 220 IF ANY OF THE FOLLOWING OCCURS  Chest pain (not relieved with nitroglycerine, if you have been prescribed this medication)  Severe shortness of breath  Faint / Pass out  Confusion / cannot think clearly  If symptoms get worse           SMOKING - TOBACCO USE:  * IF YOU SMOKE - STOP!  Kick the habit.  [facility] Program is offered at [facility] and [facility]. Call [phone] extension [phone] for more information.             ACTIVITY:   (Ask your doctor when you will be able to return to work and before starting any exercise program.  Do not drive unless unless your doctor has given you permission to do so).  Start light exercise.  Even if you can only do a small amount, exercise will help you get stronger, have more energy, help manage your weight and decrease  stress. Walking is an easy way to get exercise. Start out slowly and  increase the amount you walk as tolerated  If you become short of breath, dizzy or have chest pain; stop, sit down, and rest.  If you feel \"wiped out\" the day after you exercise, walk at a slower pace or for a shorter distance. You can gradually increase the pace or amount of time.            (Do not exercise right after a meal or in extreme temperatures, such as above 85 degrees, if the air is really humid, or wind chill is less than 20 degrees)                                             ADDITIONAL INFORMATION:  Avoid getting sick from colds and the flu. Stay away from friends or family that you know may have a contagious illness  Get plenty of rest   Get a flu shot each year.  Get a pneumococcal vaccine shot. If you have had one before, ask your

## 2024-05-09 NOTE — ANESTHESIA PRE PROCEDURE
Department of Anesthesiology  Preprocedure Note       Name:  Len Dorsey   Age:  58 y.o.  :  1965                                          MRN:  [id number]         Date:  2024      Surgeon: Surgeon(s):  Min Mcdaniels DO    Procedure: Procedure(s):  ESOPHAGOGASTRODUODENOSCOPY          +++AVAIL 1430+++    Medications prior to admission:   Prior to Admission medications    Medication Sig Start Date End Date Taking? Authorizing Provider   traZODone (DESYREL) 50 MG tablet Take 1 tablet by mouth nightly   Yes Provider, MD Mingo   aspirin 81 MG chewable tablet Take 1 tablet by mouth daily 3/6/24   Peter Olivo MD   warfarin (COUMADIN) 2.5 MG tablet Take 1 tablet by mouth nightly MANAGED BY JOE GARCIA Q4W LAST INR: \"ROUGHLY A MONTH AGO\" LAST INR RESULT: 2.0 3/5/24   Peter Olivo MD   empagliflozin (JARDIANCE) 10 MG tablet Take 1 tablet by mouth daily 3/6/24   Peter Olivo MD   atorvastatin (LIPITOR) 20 MG tablet Take 1 tablet by mouth nightly 3/5/24   Peter Olivo MD   metoprolol succinate (TOPROL XL) 25 MG extended release tablet Take 1 tablet by mouth 2 times daily 3/5/24   Peter Olivo MD   Vitamin D (CHOLECALCIFEROL) 25 MCG (1000 UT) TABS tablet Take 1 tablet by mouth daily 3/6/24   Peter Olivo MD   bumetanide (BUMEX) 2 MG tablet Take 1 tablet by mouth daily 3/6/24   Peter Olivo MD   spironolactone (ALDACTONE) 25 MG tablet Take 1 tablet by mouth daily 3/6/24   Peter Olivo MD       Current medications:    Current Facility-Administered Medications   Medication Dose Route Frequency Provider Last Rate Last Admin    heparin (porcine) injection 4,000 Units  4,000 Units IntraVENous Once Rubi Gross T, DO        heparin (porcine) injection 4,000 Units  4,000 Units IntraVENous PRN Rubi Gross, DO        heparin (porcine) injection 2,000 Units  2,000 Units IntraVENous PRN Rubi Gross, DO        heparin 25,000 units in dextrose 5% 250 mL (premix) infusion  5-30

## 2024-05-09 NOTE — ANESTHESIA POSTPROCEDURE EVALUATION
Department of Anesthesiology  Postprocedure Note    Patient: Len Dorsey  MRN: [id number]  YOB: 1965  Date of evaluation: 5/9/2024    Procedure Summary       Date: 05/09/24 Room / Location: [place] [address] / [facility]    Anesthesia Start: 1450 Anesthesia Stop: 1501    Procedure: ESOPHAGOGASTRODUODENOSCOPY          +++AVAIL 1430+++ Diagnosis:       Anemia, unspecified type      (Anemia, unspecified type [D64.9])    Surgeons: Min Mcdaniels DO Responsible Provider: Bill Clemente MD    Anesthesia Type: MAC ASA Status: 4            Anesthesia Type: MAC    Efren Phase I:      Efren Phase II: Efren Score: 10    Anesthesia Post Evaluation    Patient location during evaluation: PACU  Patient participation: complete - patient participated  Level of consciousness: awake and alert  Airway patency: patent  Nausea & Vomiting: no nausea and no vomiting  Cardiovascular status: hemodynamically stable  Respiratory status: acceptable  Hydration status: euvolemic  There was medical reason for not using a multimodal analgesia pain management approach.Pain management: adequate    No notable events documented.

## 2024-05-09 NOTE — PLAN OF CARE
Patient's chart updated to reflect:      .    - HF care plan, HF education points and HF discharge instructions.  -Orders: 2 gram sodium diet, daily weights, I/O.  -PCP and cardiology follow up appointments to be scheduled within 7 days of hospital discharge.  -CHF education session will be provided to the patient prior to hospital discharge.    Analisa Coker RN   Heart Failure Navigator

## 2024-05-09 NOTE — PLAN OF CARE
Problem: Pain  Goal: Verbalizes/displays adequate comfort level or baseline comfort level  Outcome: Progressing     Problem: Skin/Tissue Integrity  Goal: Absence of new skin breakdown  Description: 1.  Monitor for areas of redness and/or skin breakdown  2.  Assess vascular access sites hourly  3.  Every 4-6 hours minimum:  Change oxygen saturation probe site  4.  Every 4-6 hours:  If on nasal continuous positive airway pressure, respiratory therapy assess nares and determine need for appliance change or resting period.  Outcome: Progressing     Problem: Safety - Adult  Goal: Free from fall injury  Outcome: Progressing     Problem: Discharge Planning  Goal: Discharge to home or other facility with appropriate resources  Outcome: Progressing     Problem: Chronic Conditions and Co-morbidities  Goal: Patient's chronic conditions and co-morbidity symptoms are monitored and maintained or improved  Outcome: Progressing

## 2024-05-09 NOTE — OP NOTE
Operative Note      Patient: Len Dorsey  YOB: 1965  MRN: [id number]    Date of Procedure: 5/9/2024    Pre-Op Diagnosis Codes:     * Anemia, unspecified type [D64.9], Cirrhosis, GI Bleed    Post-Op Diagnosis: SAME       Procedure(s):  ESOPHAGOGASTRODUODENOSCOPY          +++AVAIL 1430+++    Surgeon(s):  Min Mcdaniels DO    Assistant:   * No surgical staff found *    Anesthesia: Monitor Anesthesia Care    Estimated Blood Loss (mL): < 5cc    Complications: None    Specimens:   * No specimens in log *    Implants:  * No implants in log *      Drains:   External Urinary Catheter (Active)   Site Assessment Clean,dry & intact 05/08/24 2232   Placement Replaced 05/08/24 2232   Perineal Care Yes 05/08/24 2232   Urine Color Yellow 05/08/24 2232   Output (mL) 700 mL 05/09/24 0026       Detailed Description of Procedure:   Procedure:  Esophagogastroduodenoscopy    Indication:  GI Bleed, Anemia, Cirrhosis    Consent: Informed consent was obtained from the patient including and not limited to risk of perforation, aspiration of gastric contents or teeth, bleeding, infection, dental breakage, ileus, need for surgery, or worst case death.    Sedation  MAC    Estimated Blood Loss -- < 5cc    Endoscope was advanced easily through mouth to second portion of duodenum      Oropharynx views are limited but grossly normal.    Esophagus:   Mucosa is normal.  No significant varices seen.  No francisco rai tear seen.  GEJ at ~38 cm.      Stomach:   Antrum with mild gastritis with biopsy    Gastric body is normal other than mild portal HTN gastropathy.  Some food residue limited visual in part of the stomach.  NO fresh or old blood seen.    Retroflexed views showed normal fundus and cardia with no gastric varices seen.    Duodenum: Bulb with mild to moderate duodenitis, no ulcers or vessels seen.  No AVM's.    Second portion of duodenum is normal.  No fresh of old blood.  No AVM's.    IMPRESSION AND PLAN:     1.  Normal

## 2024-05-09 NOTE — CARE COORDINATION
Social Work / Discharge Planning :SW followed up with patient to discuss goals at discharge . Patient upset wanting \" to eat and get out of here. \" Patient verified plan is HOME. Patient does NOT want any type of physical rehab. Currently on 2 liters sats 100% . Oxygen is NEW . Goal to wean. Therapy walked patient with a ww. SW asked about getting patient a ww but declined. Goal home currently no needs for SW. Peer following. SW to follow. Electronically signed by ANJEL Crowe on 5/9/24 at 10:17 AM EDT

## 2024-05-10 ENCOUNTER — APPOINTMENT (OUTPATIENT)
Age: 59
DRG: 377 | End: 2024-05-10
Payer: MEDICARE

## 2024-05-10 LAB
AFP SERPL-MCNC: 2.3 UG/L
ALBUMIN SERPL-MCNC: 4 G/DL (ref 3.5–5.2)
ALP SERPL-CCNC: 157 U/L (ref 40–129)
ALT SERPL-CCNC: 8 U/L (ref 0–40)
ANION GAP SERPL CALCULATED.3IONS-SCNC: 13 MMOL/L (ref 7–16)
AST SERPL-CCNC: 18 U/L (ref 0–39)
BILIRUB SERPL-MCNC: 1.5 MG/DL (ref 0–1.2)
BUN SERPL-MCNC: 28 MG/DL (ref 6–20)
CALCIUM SERPL-MCNC: 9.1 MG/DL (ref 8.6–10.2)
CHLORIDE SERPL-SCNC: 97 MMOL/L (ref 98–107)
CO2 SERPL-SCNC: 27 MMOL/L (ref 22–29)
CREAT SERPL-MCNC: 2.4 MG/DL (ref 0.7–1.2)
GFR, ESTIMATED: 30 ML/MIN/1.73M2
GLUCOSE SERPL-MCNC: 115 MG/DL (ref 74–99)
HCT VFR BLD AUTO: 29.4 % (ref 37–54)
HCT VFR BLD AUTO: 31.2 % (ref 37–54)
HGB BLD-MCNC: 8.7 G/DL (ref 12.5–16.5)
HGB BLD-MCNC: 9.2 G/DL (ref 12.5–16.5)
INR PPP: 1.4
MAGNESIUM SERPL-MCNC: 1.9 MG/DL (ref 1.6–2.6)
PARTIAL THROMBOPLASTIN TIME: 46.3 SEC (ref 24.5–35.1)
PARTIAL THROMBOPLASTIN TIME: 55.1 SEC (ref 24.5–35.1)
PARTIAL THROMBOPLASTIN TIME: 58.8 SEC (ref 24.5–35.1)
POTASSIUM SERPL-SCNC: 4 MMOL/L (ref 3.5–5)
PROT SERPL-MCNC: 7.2 G/DL (ref 6.4–8.3)
PROTHROMBIN TIME: 15.2 SEC (ref 9.3–12.4)
SODIUM SERPL-SCNC: 137 MMOL/L (ref 132–146)

## 2024-05-10 PROCEDURE — 6370000000 HC RX 637 (ALT 250 FOR IP): Performed by: STUDENT IN AN ORGANIZED HEALTH CARE EDUCATION/TRAINING PROGRAM

## 2024-05-10 PROCEDURE — C9113 INJ PANTOPRAZOLE SODIUM, VIA: HCPCS

## 2024-05-10 PROCEDURE — 6360000004 HC RX CONTRAST MEDICATION: Performed by: CLINICAL NURSE SPECIALIST

## 2024-05-10 PROCEDURE — 6370000000 HC RX 637 (ALT 250 FOR IP): Performed by: NURSE PRACTITIONER

## 2024-05-10 PROCEDURE — 2000000000 HC ICU R&B

## 2024-05-10 PROCEDURE — 36415 COLL VENOUS BLD VENIPUNCTURE: CPT

## 2024-05-10 PROCEDURE — 6360000002 HC RX W HCPCS: Performed by: STUDENT IN AN ORGANIZED HEALTH CARE EDUCATION/TRAINING PROGRAM

## 2024-05-10 PROCEDURE — 99233 SBSQ HOSP IP/OBS HIGH 50: CPT | Performed by: STUDENT IN AN ORGANIZED HEALTH CARE EDUCATION/TRAINING PROGRAM

## 2024-05-10 PROCEDURE — 2580000003 HC RX 258: Performed by: INTERNAL MEDICINE

## 2024-05-10 PROCEDURE — 85014 HEMATOCRIT: CPT

## 2024-05-10 PROCEDURE — 80053 COMPREHEN METABOLIC PANEL: CPT

## 2024-05-10 PROCEDURE — 6370000000 HC RX 637 (ALT 250 FOR IP): Performed by: HOSPITALIST

## 2024-05-10 PROCEDURE — 2500000003 HC RX 250 WO HCPCS: Performed by: STUDENT IN AN ORGANIZED HEALTH CARE EDUCATION/TRAINING PROGRAM

## 2024-05-10 PROCEDURE — 2580000003 HC RX 258: Performed by: STUDENT IN AN ORGANIZED HEALTH CARE EDUCATION/TRAINING PROGRAM

## 2024-05-10 PROCEDURE — 6360000002 HC RX W HCPCS

## 2024-05-10 PROCEDURE — 87081 CULTURE SCREEN ONLY: CPT

## 2024-05-10 PROCEDURE — C8929 TTE W OR WO FOL WCON,DOPPLER: HCPCS

## 2024-05-10 PROCEDURE — 85610 PROTHROMBIN TIME: CPT

## 2024-05-10 PROCEDURE — 83735 ASSAY OF MAGNESIUM: CPT

## 2024-05-10 PROCEDURE — 6370000000 HC RX 637 (ALT 250 FOR IP): Performed by: CLINICAL NURSE SPECIALIST

## 2024-05-10 PROCEDURE — 85730 THROMBOPLASTIN TIME PARTIAL: CPT

## 2024-05-10 PROCEDURE — 85018 HEMOGLOBIN: CPT

## 2024-05-10 PROCEDURE — 6370000000 HC RX 637 (ALT 250 FOR IP): Performed by: INTERNAL MEDICINE

## 2024-05-10 PROCEDURE — 99233 SBSQ HOSP IP/OBS HIGH 50: CPT | Performed by: INTERNAL MEDICINE

## 2024-05-10 PROCEDURE — 2580000003 HC RX 258

## 2024-05-10 PROCEDURE — 6360000002 HC RX W HCPCS: Performed by: NURSE PRACTITIONER

## 2024-05-10 PROCEDURE — 2700000000 HC OXYGEN THERAPY PER DAY

## 2024-05-10 RX ORDER — PHENOBARBITAL SODIUM 65 MG/ML
130 INJECTION, SOLUTION INTRAMUSCULAR; INTRAVENOUS ONCE
Status: DISCONTINUED | OUTPATIENT
Start: 2024-05-10 | End: 2024-05-19 | Stop reason: HOSPADM

## 2024-05-10 RX ORDER — LACTULOSE 10 G/15ML
20 SOLUTION ORAL DAILY
Status: DISCONTINUED | OUTPATIENT
Start: 2024-05-11 | End: 2024-05-19 | Stop reason: HOSPADM

## 2024-05-10 RX ORDER — PHENOBARBITAL 32.4 MG/1
64.8 TABLET ORAL 4 TIMES DAILY
Status: DISCONTINUED | OUTPATIENT
Start: 2024-05-10 | End: 2024-05-10

## 2024-05-10 RX ORDER — PHENOBARBITAL 32.4 MG/1
16.2 TABLET ORAL 2 TIMES DAILY
Status: DISCONTINUED | OUTPATIENT
Start: 2024-05-13 | End: 2024-05-10

## 2024-05-10 RX ORDER — LORAZEPAM 2 MG/ML
2 INJECTION INTRAMUSCULAR ONCE
Status: DISCONTINUED | OUTPATIENT
Start: 2024-05-10 | End: 2024-05-10

## 2024-05-10 RX ORDER — LACTULOSE 10 G/15ML
20 SOLUTION ORAL 2 TIMES DAILY
Qty: 60 EACH | Refills: 1 | Status: SHIPPED | OUTPATIENT
Start: 2024-05-10 | End: 2024-05-10 | Stop reason: HOSPADM

## 2024-05-10 RX ORDER — LACTULOSE 10 G/15ML
20 SOLUTION ORAL DAILY
Qty: 30 EACH | Refills: 1 | Status: SHIPPED | OUTPATIENT
Start: 2024-05-11

## 2024-05-10 RX ORDER — PHENOBARBITAL 32.4 MG/1
32.4 TABLET ORAL 2 TIMES DAILY
Status: DISCONTINUED | OUTPATIENT
Start: 2024-05-12 | End: 2024-05-10

## 2024-05-10 RX ORDER — DIAZEPAM 5 MG/1
5 TABLET ORAL 3 TIMES DAILY
Status: DISCONTINUED | OUTPATIENT
Start: 2024-05-10 | End: 2024-05-10

## 2024-05-10 RX ORDER — SODIUM CHLORIDE, SODIUM LACTATE, POTASSIUM CHLORIDE, AND CALCIUM CHLORIDE .6; .31; .03; .02 G/100ML; G/100ML; G/100ML; G/100ML
500 INJECTION, SOLUTION INTRAVENOUS ONCE
Status: COMPLETED | OUTPATIENT
Start: 2024-05-10 | End: 2024-05-10

## 2024-05-10 RX ORDER — SODIUM CHLORIDE, SODIUM LACTATE, POTASSIUM CHLORIDE, CALCIUM CHLORIDE 600; 310; 30; 20 MG/100ML; MG/100ML; MG/100ML; MG/100ML
INJECTION, SOLUTION INTRAVENOUS ONCE
Status: DISCONTINUED | OUTPATIENT
Start: 2024-05-10 | End: 2024-05-19 | Stop reason: HOSPADM

## 2024-05-10 RX ORDER — PHENOBARBITAL SODIUM 65 MG/ML
65 INJECTION, SOLUTION INTRAMUSCULAR; INTRAVENOUS EVERY 8 HOURS SCHEDULED
Status: DISCONTINUED | OUTPATIENT
Start: 2024-05-10 | End: 2024-05-11

## 2024-05-10 RX ORDER — NICOTINE 21 MG/24HR
1 PATCH, TRANSDERMAL 24 HOURS TRANSDERMAL DAILY
Status: DISCONTINUED | OUTPATIENT
Start: 2024-05-10 | End: 2024-05-19 | Stop reason: HOSPADM

## 2024-05-10 RX ORDER — PHENOBARBITAL 32.4 MG/1
16.2 TABLET ORAL EVERY 6 HOURS PRN
Status: DISCONTINUED | OUTPATIENT
Start: 2024-05-13 | End: 2024-05-10

## 2024-05-10 RX ORDER — PHENOBARBITAL 32.4 MG/1
64.8 TABLET ORAL EVERY 6 HOURS PRN
Status: DISCONTINUED | OUTPATIENT
Start: 2024-05-10 | End: 2024-05-10

## 2024-05-10 RX ORDER — PANTOPRAZOLE SODIUM 40 MG/1
40 TABLET, DELAYED RELEASE ORAL
Qty: 30 TABLET | Refills: 3 | Status: SHIPPED | OUTPATIENT
Start: 2024-05-10

## 2024-05-10 RX ORDER — PHENOBARBITAL 32.4 MG/1
32.4 TABLET ORAL EVERY 6 HOURS PRN
Status: DISCONTINUED | OUTPATIENT
Start: 2024-05-11 | End: 2024-05-10

## 2024-05-10 RX ORDER — SODIUM CHLORIDE, SODIUM LACTATE, POTASSIUM CHLORIDE, CALCIUM CHLORIDE 600; 310; 30; 20 MG/100ML; MG/100ML; MG/100ML; MG/100ML
INJECTION, SOLUTION INTRAVENOUS CONTINUOUS
Status: DISCONTINUED | OUTPATIENT
Start: 2024-05-10 | End: 2024-05-12

## 2024-05-10 RX ORDER — PHENOBARBITAL 32.4 MG/1
32.4 TABLET ORAL 4 TIMES DAILY
Status: DISCONTINUED | OUTPATIENT
Start: 2024-05-11 | End: 2024-05-10

## 2024-05-10 RX ORDER — WARFARIN SODIUM 2.5 MG/1
2.5 TABLET ORAL
Status: ACTIVE | OUTPATIENT
Start: 2024-05-10 | End: 2024-05-11

## 2024-05-10 RX ORDER — PHENOBARBITAL SODIUM 65 MG/ML
130 INJECTION, SOLUTION INTRAMUSCULAR; INTRAVENOUS ONCE
Status: COMPLETED | OUTPATIENT
Start: 2024-05-10 | End: 2024-05-10

## 2024-05-10 RX ADMIN — PHENOBARBITAL SODIUM 130 MG: 65 INJECTION INTRAMUSCULAR; INTRAVENOUS at 04:05

## 2024-05-10 RX ADMIN — LORAZEPAM 1 MG: 1 TABLET ORAL at 00:10

## 2024-05-10 RX ADMIN — PHENOBARBITAL 64.8 MG: 32.4 TABLET ORAL at 11:04

## 2024-05-10 RX ADMIN — SODIUM CHLORIDE, PRESERVATIVE FREE 10 ML: 5 INJECTION INTRAVENOUS at 09:51

## 2024-05-10 RX ADMIN — SODIUM CHLORIDE, PRESERVATIVE FREE 40 MG: 5 INJECTION INTRAVENOUS at 17:08

## 2024-05-10 RX ADMIN — PERFLUTREN 1.5 ML: 6.52 INJECTION, SUSPENSION INTRAVENOUS at 12:18

## 2024-05-10 RX ADMIN — LORAZEPAM 3 MG: 2 INJECTION INTRAMUSCULAR; INTRAVENOUS at 12:01

## 2024-05-10 RX ADMIN — ISOSORBIDE DINITRATE 5 MG: 10 TABLET ORAL at 09:51

## 2024-05-10 RX ADMIN — LORAZEPAM 4 MG: 2 INJECTION INTRAMUSCULAR; INTRAVENOUS at 14:21

## 2024-05-10 RX ADMIN — PANTOPRAZOLE SODIUM 40 MG: 40 TABLET, DELAYED RELEASE ORAL at 06:24

## 2024-05-10 RX ADMIN — SODIUM CHLORIDE, PRESERVATIVE FREE 10 ML: 5 INJECTION INTRAVENOUS at 21:36

## 2024-05-10 RX ADMIN — METOPROLOL SUCCINATE 25 MG: 25 TABLET, FILM COATED, EXTENDED RELEASE ORAL at 09:51

## 2024-05-10 RX ADMIN — HEPARIN SODIUM 2000 UNITS: 1000 INJECTION INTRAVENOUS; SUBCUTANEOUS at 00:47

## 2024-05-10 RX ADMIN — PHENOBARBITAL SODIUM 65 MG: 65 INJECTION, SOLUTION INTRAMUSCULAR; INTRAVENOUS at 21:35

## 2024-05-10 RX ADMIN — HEPARIN SODIUM 11 UNITS/KG/HR: 10000 INJECTION, SOLUTION INTRAVENOUS at 00:50

## 2024-05-10 RX ADMIN — LORAZEPAM 2 MG: 2 INJECTION INTRAMUSCULAR; INTRAVENOUS at 02:23

## 2024-05-10 RX ADMIN — HEPARIN SODIUM 11 UNITS/KG/HR: 10000 INJECTION, SOLUTION INTRAVENOUS at 13:53

## 2024-05-10 RX ADMIN — LORAZEPAM 2 MG: 2 INJECTION INTRAMUSCULAR; INTRAVENOUS at 07:00

## 2024-05-10 RX ADMIN — DIAZEPAM 5 MG: 5 TABLET ORAL at 09:51

## 2024-05-10 RX ADMIN — LORAZEPAM 2 MG: 2 INJECTION INTRAMUSCULAR; INTRAVENOUS at 01:09

## 2024-05-10 RX ADMIN — LACTULOSE 20 G: 20 SOLUTION ORAL at 09:51

## 2024-05-10 RX ADMIN — RIFAXIMIN 550 MG: 550 TABLET ORAL at 09:51

## 2024-05-10 RX ADMIN — DEXMEDETOMIDINE 0.2 MCG/KG/HR: 100 INJECTION, SOLUTION INTRAVENOUS at 13:54

## 2024-05-10 RX ADMIN — HYDRALAZINE HYDROCHLORIDE 10 MG: 10 TABLET, FILM COATED ORAL at 06:24

## 2024-05-10 RX ADMIN — LORAZEPAM 4 MG: 2 INJECTION INTRAMUSCULAR; INTRAVENOUS at 03:13

## 2024-05-10 RX ADMIN — SODIUM CHLORIDE, POTASSIUM CHLORIDE, SODIUM LACTATE AND CALCIUM CHLORIDE: 600; 310; 30; 20 INJECTION, SOLUTION INTRAVENOUS at 17:09

## 2024-05-10 RX ADMIN — SODIUM CHLORIDE, POTASSIUM CHLORIDE, SODIUM LACTATE AND CALCIUM CHLORIDE 500 ML: 600; 310; 30; 20 INJECTION, SOLUTION INTRAVENOUS at 09:50

## 2024-05-10 RX ADMIN — Medication 100 MG: at 09:51

## 2024-05-10 NOTE — PLAN OF CARE
Problem: Safety - Medical Restraint  Goal: Remains free of injury from restraints (Restraint for Interference with Medical Device)  Description: INTERVENTIONS:  1. Determine that other, less restrictive measures have been tried or would not be effective before applying the restraint  2. Evaluate the patient's condition at the time of restraint application  3. Inform patient/family regarding the reason for restraint  4. Q2H: Monitor safety, psychosocial status, comfort, nutrition and hydration  5/10/2024 1449 by Elie Brasher, RN  Outcome: Not Progressing  5/10/2024 1449 by Elie Brasher, RN  Outcome: Progressing  Flowsheets (Taken 5/10/2024 1243 by Elana Squires, RN)  Remains free of injury from restraints (restraint for interference with medical device):   Determine that other, less restrictive measures have been tried or would not be effective before applying the restraint   Evaluate the patient's condition at the time of restraint application   Inform patient/family regarding the reason for restraint   Every 2 hours: Monitor safety, psychosocial status, comfort, nutrition and hydration

## 2024-05-10 NOTE — CARE COORDINATION
Social Work / Discharge PLanning : CIWA Scale 24.Peer recovery following. Therapy worked with patient and verified plan is HOME. Declined HHC and recommendation for ww. Currently on Oxygen and RA baseline. .  Will follow up with patient after withdrawals completed. SW to follow. Electronically signed by ANJEL Crowe on 5/10/24 at 9:34 AM EDT

## 2024-05-10 NOTE — FLOWSHEET NOTE
Patient admitted from 6S to 211, with the following belongings; pants, shoes, shirt, socks, cellphone, cellphone , placed on monitor, patient oriented to room and unit visiting hours.  Patient guide at bedside, reviewed patient rights and responsibilities. MRSA nasal swab obtained.  Bed alarm on.  Call light within reach.

## 2024-05-10 NOTE — FLOWSHEET NOTE
4 Eyes Skin Assessment     NAME:  Len Dorsey  YOB: 1965  MEDICAL RECORD NUMBER:  [id number]    The patient is being assessed for  Admission    I agree that at least one RN has performed a thorough Head to Toe Skin Assessment on the patient. ALL assessment sites listed below have been assessed.      Areas assessed by both nurses:    Head, Face, Ears, Shoulders, Back, Chest, Arms, Elbows, Hands, Sacrum. Buttock, Coccyx, Ischium, and Legs. Feet and Heels        Does the Patient have a Wound? No noted wound(s)       Jasvir Prevention initiated by RN: No  Wound Care Orders initiated by RN: No    Pressure Injury (Stage 3,4, Unstageable, DTI, NWPT, and Complex wounds) if present, place Wound referral order by RN under : No    New Ostomies, if present place, Ostomy referral order under : No     Nurse 1 eSignature: Electronically signed by Edna Zhao RN on 5/10/24 at 7:42 PM EDT    **SHARE this note so that the co-signing nurse can place an eSignature**    Nurse 2 eSignature: Electronically signed by Judy Amato RN on 5/10/24 at 8:36 PM EDT

## 2024-05-10 NOTE — PLAN OF CARE
Problem: Pain  Goal: Verbalizes/displays adequate comfort level or baseline comfort level  5/10/2024 0107 by Kelin Paniagua, RN  Outcome: Progressing     Problem: Skin/Tissue Integrity  Goal: Absence of new skin breakdown  Description: 1.  Monitor for areas of redness and/or skin breakdown  2.  Assess vascular access sites hourly  3.  Every 4-6 hours minimum:  Change oxygen saturation probe site  4.  Every 4-6 hours:  If on nasal continuous positive airway pressure, respiratory therapy assess nares and determine need for appliance change or resting period.  5/10/2024 0107 by Kelin Paniagua, RN  Outcome: Progressing     Problem: Safety - Adult  Goal: Free from fall injury  5/10/2024 0107 by Kelin Paniagua, RN  Outcome: Progressing     Problem: Discharge Planning  Goal: Discharge to home or other facility with appropriate resources  5/10/2024 0107 by Kelin Paniagua, RN  Outcome: Progressing

## 2024-05-11 LAB
ALBUMIN SERPL-MCNC: 3.8 G/DL (ref 3.5–5.2)
ALP SERPL-CCNC: 144 U/L (ref 40–129)
ALT SERPL-CCNC: 6 U/L (ref 0–40)
ANION GAP SERPL CALCULATED.3IONS-SCNC: 12 MMOL/L (ref 7–16)
AST SERPL-CCNC: 18 U/L (ref 0–39)
BASOPHILS # BLD: 0.03 K/UL (ref 0–0.2)
BASOPHILS NFR BLD: 1 % (ref 0–2)
BILIRUB SERPL-MCNC: 1.1 MG/DL (ref 0–1.2)
BUN SERPL-MCNC: 26 MG/DL (ref 6–20)
CALCIUM SERPL-MCNC: 8.8 MG/DL (ref 8.6–10.2)
CHLORIDE SERPL-SCNC: 98 MMOL/L (ref 98–107)
CO2 SERPL-SCNC: 27 MMOL/L (ref 22–29)
CREAT SERPL-MCNC: 2.3 MG/DL (ref 0.7–1.2)
EOSINOPHIL # BLD: 0.19 K/UL (ref 0.05–0.5)
EOSINOPHILS RELATIVE PERCENT: 3 % (ref 0–6)
ERYTHROCYTE [DISTWIDTH] IN BLOOD BY AUTOMATED COUNT: 17.8 % (ref 11.5–15)
GFR, ESTIMATED: 33 ML/MIN/1.73M2
GLUCOSE SERPL-MCNC: 110 MG/DL (ref 74–99)
HCT VFR BLD AUTO: 32.6 % (ref 37–54)
HGB BLD-MCNC: 9.5 G/DL (ref 12.5–16.5)
IMM GRANULOCYTES # BLD AUTO: <0.03 K/UL (ref 0–0.58)
IMM GRANULOCYTES NFR BLD: 0 % (ref 0–5)
INR PPP: 1.2
LYMPHOCYTES NFR BLD: 0.96 K/UL (ref 1.5–4)
LYMPHOCYTES RELATIVE PERCENT: 15 % (ref 20–42)
MAGNESIUM SERPL-MCNC: 2 MG/DL (ref 1.6–2.6)
MCH RBC QN AUTO: 25.5 PG (ref 26–35)
MCHC RBC AUTO-ENTMCNC: 29.1 G/DL (ref 32–34.5)
MCV RBC AUTO: 87.6 FL (ref 80–99.9)
MONOCYTES NFR BLD: 0.66 K/UL (ref 0.1–0.95)
MONOCYTES NFR BLD: 10 % (ref 2–12)
NEUTROPHILS NFR BLD: 72 % (ref 43–80)
NEUTS SEG NFR BLD: 4.68 K/UL (ref 1.8–7.3)
PARTIAL THROMBOPLASTIN TIME: 65.5 SEC (ref 24.5–35.1)
PHOSPHATE SERPL-MCNC: 4.1 MG/DL (ref 2.5–4.5)
PLATELET, FLUORESCENCE: 102 K/UL (ref 130–450)
PMV BLD AUTO: 10.8 FL (ref 7–12)
POTASSIUM SERPL-SCNC: 4.2 MMOL/L (ref 3.5–5)
PROT SERPL-MCNC: 6.6 G/DL (ref 6.4–8.3)
PROTHROMBIN TIME: 13.5 SEC (ref 9.3–12.4)
RBC # BLD AUTO: 3.72 M/UL (ref 3.8–5.8)
SODIUM SERPL-SCNC: 137 MMOL/L (ref 132–146)
WBC OTHER # BLD: 6.5 K/UL (ref 4.5–11.5)

## 2024-05-11 PROCEDURE — 6370000000 HC RX 637 (ALT 250 FOR IP): Performed by: HOSPITALIST

## 2024-05-11 PROCEDURE — 51701 INSERT BLADDER CATHETER: CPT

## 2024-05-11 PROCEDURE — 2580000003 HC RX 258: Performed by: INTERNAL MEDICINE

## 2024-05-11 PROCEDURE — 85025 COMPLETE CBC W/AUTO DIFF WBC: CPT

## 2024-05-11 PROCEDURE — 84100 ASSAY OF PHOSPHORUS: CPT

## 2024-05-11 PROCEDURE — 80053 COMPREHEN METABOLIC PANEL: CPT

## 2024-05-11 PROCEDURE — 36415 COLL VENOUS BLD VENIPUNCTURE: CPT

## 2024-05-11 PROCEDURE — 51798 US URINE CAPACITY MEASURE: CPT

## 2024-05-11 PROCEDURE — 6360000002 HC RX W HCPCS: Performed by: STUDENT IN AN ORGANIZED HEALTH CARE EDUCATION/TRAINING PROGRAM

## 2024-05-11 PROCEDURE — 85730 THROMBOPLASTIN TIME PARTIAL: CPT

## 2024-05-11 PROCEDURE — 85610 PROTHROMBIN TIME: CPT

## 2024-05-11 PROCEDURE — 6370000000 HC RX 637 (ALT 250 FOR IP): Performed by: STUDENT IN AN ORGANIZED HEALTH CARE EDUCATION/TRAINING PROGRAM

## 2024-05-11 PROCEDURE — 2000000000 HC ICU R&B

## 2024-05-11 PROCEDURE — 83735 ASSAY OF MAGNESIUM: CPT

## 2024-05-11 PROCEDURE — C9113 INJ PANTOPRAZOLE SODIUM, VIA: HCPCS

## 2024-05-11 PROCEDURE — 6370000000 HC RX 637 (ALT 250 FOR IP): Performed by: INTERNAL MEDICINE

## 2024-05-11 PROCEDURE — A4216 STERILE WATER/SALINE, 10 ML: HCPCS

## 2024-05-11 PROCEDURE — 51702 INSERT TEMP BLADDER CATH: CPT

## 2024-05-11 PROCEDURE — 99233 SBSQ HOSP IP/OBS HIGH 50: CPT | Performed by: STUDENT IN AN ORGANIZED HEALTH CARE EDUCATION/TRAINING PROGRAM

## 2024-05-11 PROCEDURE — 99233 SBSQ HOSP IP/OBS HIGH 50: CPT | Performed by: INTERNAL MEDICINE

## 2024-05-11 PROCEDURE — 6360000002 HC RX W HCPCS

## 2024-05-11 PROCEDURE — 2580000003 HC RX 258

## 2024-05-11 RX ORDER — WARFARIN SODIUM 5 MG/1
5 TABLET ORAL
Status: COMPLETED | OUTPATIENT
Start: 2024-05-11 | End: 2024-05-11

## 2024-05-11 RX ORDER — PHENOBARBITAL SODIUM 65 MG/ML
65 INJECTION, SOLUTION INTRAMUSCULAR; INTRAVENOUS EVERY 12 HOURS SCHEDULED
Status: DISCONTINUED | OUTPATIENT
Start: 2024-05-11 | End: 2024-05-15

## 2024-05-11 RX ORDER — LIDOCAINE HYDROCHLORIDE 20 MG/ML
JELLY TOPICAL PRN
Status: DISCONTINUED | OUTPATIENT
Start: 2024-05-11 | End: 2024-05-19 | Stop reason: HOSPADM

## 2024-05-11 RX ADMIN — LIDOCAINE HYDROCHLORIDE: 20 JELLY TOPICAL at 13:27

## 2024-05-11 RX ADMIN — SODIUM CHLORIDE, PRESERVATIVE FREE 10 ML: 5 INJECTION INTRAVENOUS at 21:14

## 2024-05-11 RX ADMIN — METOPROLOL SUCCINATE 25 MG: 25 TABLET, FILM COATED, EXTENDED RELEASE ORAL at 21:08

## 2024-05-11 RX ADMIN — SODIUM CHLORIDE, POTASSIUM CHLORIDE, SODIUM LACTATE AND CALCIUM CHLORIDE: 600; 310; 30; 20 INJECTION, SOLUTION INTRAVENOUS at 14:23

## 2024-05-11 RX ADMIN — PHENOBARBITAL SODIUM 65 MG: 65 INJECTION INTRAMUSCULAR at 18:09

## 2024-05-11 RX ADMIN — SODIUM CHLORIDE, PRESERVATIVE FREE 40 MG: 5 INJECTION INTRAVENOUS at 18:09

## 2024-05-11 RX ADMIN — HEPARIN SODIUM 11 UNITS/KG/HR: 10000 INJECTION, SOLUTION INTRAVENOUS at 11:16

## 2024-05-11 RX ADMIN — TRAZODONE HYDROCHLORIDE 50 MG: 50 TABLET ORAL at 21:07

## 2024-05-11 RX ADMIN — RIFAXIMIN 550 MG: 550 TABLET ORAL at 21:08

## 2024-05-11 RX ADMIN — LORAZEPAM 2 MG: 2 INJECTION INTRAMUSCULAR; INTRAVENOUS at 12:03

## 2024-05-11 RX ADMIN — SODIUM CHLORIDE, POTASSIUM CHLORIDE, SODIUM LACTATE AND CALCIUM CHLORIDE: 600; 310; 30; 20 INJECTION, SOLUTION INTRAVENOUS at 00:55

## 2024-05-11 RX ADMIN — PHENOBARBITAL SODIUM 65 MG: 65 INJECTION, SOLUTION INTRAMUSCULAR; INTRAVENOUS at 05:12

## 2024-05-11 RX ADMIN — SODIUM CHLORIDE, PRESERVATIVE FREE 10 ML: 5 INJECTION INTRAVENOUS at 08:21

## 2024-05-11 RX ADMIN — SODIUM CHLORIDE, PRESERVATIVE FREE 40 MG: 5 INJECTION INTRAVENOUS at 05:12

## 2024-05-11 RX ADMIN — LORAZEPAM 2 MG: 1 TABLET ORAL at 21:13

## 2024-05-11 RX ADMIN — ATORVASTATIN CALCIUM 20 MG: 20 TABLET, FILM COATED ORAL at 21:08

## 2024-05-11 RX ADMIN — WARFARIN SODIUM 5 MG: 5 TABLET ORAL at 18:09

## 2024-05-11 ASSESSMENT — PAIN SCALES - GENERAL: PAINLEVEL_OUTOF10: 0

## 2024-05-11 NOTE — FLOWSHEET NOTE
Pt conversing during bath but very confused. Saying off the wall things. HALL. Pt asked if he had to pee. He said yes but did not urinate. Pt bladder scanned for 720cc. Straight cathed for 500cc. External catheter left off. Pt tolerated the procedure well.

## 2024-05-11 NOTE — FLOWSHEET NOTE
Pt very hard to arouse at first  assessment. Precedex gtt off at the present.  About 2200 pt awake - talking agressively and threatening . Emotional support given. Pt untied. Asking to call Leonie. Cell phone given to pt. Pt unable to dial number. PCA dialed number per hospital room phone. No answer. Precedex restarted at 0.2 mcg /kg/hr. For restlessness. 0000 Pt back to being harder to arouse. Bladder scan done due to no urine in external catheter. 400cc noted.

## 2024-05-11 NOTE — PATIENT CARE CONFERENCE
Intensive Care Daily Quality Rounding Checklist      ICU Team Members:     ICU Day #: 2    Intubation Date:  N/A    Ventilator Day #: N/A    Central Line Insertion Date:  N/A        Day #: N/A        Indication: N/A     Arterial Line Insertion Date:  N/A      Day #: N/A    Temporary Hemodialysis Catheter Insertion Date:  N/A      Day # N/A    DVT Prophylaxis: heparin gtt    GI Prophylaxis: protonix    Culver Catheter Insertion Date:  N/A       Day #: N/A      Indications: N/A      Continued need (if yes, reason documented and discussed with physician):     Skin Issues/ Wounds and ordered treatment discussed on rounds:     Goals/ Plans for the Day:     Reviewed plan and goals for day with patient and/or representative:

## 2024-05-12 ENCOUNTER — APPOINTMENT (OUTPATIENT)
Dept: GENERAL RADIOLOGY | Age: 59
DRG: 377 | End: 2024-05-12
Payer: MEDICARE

## 2024-05-12 LAB
ABO/RH: NORMAL
ALBUMIN SERPL-MCNC: 3.6 G/DL (ref 3.5–5.2)
ALP SERPL-CCNC: 154 U/L (ref 40–129)
ALT SERPL-CCNC: 7 U/L (ref 0–40)
ANION GAP SERPL CALCULATED.3IONS-SCNC: 9 MMOL/L (ref 7–16)
ANTIBODY SCREEN: NEGATIVE
ARM BAND NUMBER: NORMAL
AST SERPL-CCNC: 19 U/L (ref 0–39)
BASOPHILS # BLD: 0.03 K/UL (ref 0–0.2)
BASOPHILS NFR BLD: 0 % (ref 0–2)
BILIRUB SERPL-MCNC: 0.9 MG/DL (ref 0–1.2)
BLOOD BANK DISPENSE STATUS: NORMAL
BLOOD BANK DISPENSE STATUS: NORMAL
BLOOD BANK SAMPLE EXPIRATION: NORMAL
BNP SERPL-MCNC: 2139 PG/ML (ref 0–125)
BPU ID: NORMAL
BPU ID: NORMAL
BUN SERPL-MCNC: 26 MG/DL (ref 6–20)
CALCIUM SERPL-MCNC: 8.7 MG/DL (ref 8.6–10.2)
CHLORIDE SERPL-SCNC: 100 MMOL/L (ref 98–107)
CO2 SERPL-SCNC: 26 MMOL/L (ref 22–29)
COMPONENT: NORMAL
COMPONENT: NORMAL
CREAT SERPL-MCNC: 2.1 MG/DL (ref 0.7–1.2)
CROSSMATCH RESULT: NORMAL
CROSSMATCH RESULT: NORMAL
ECHO AV AREA PEAK VELOCITY: 1 CM2
ECHO AV AREA VTI: 1.1 CM2
ECHO AV AREA/BSA PEAK VELOCITY: 0.4 CM2/M2
ECHO AV AREA/BSA VTI: 0.5 CM2/M2
ECHO AV MEAN GRADIENT: 12 MMHG
ECHO AV MEAN VELOCITY: 1.7 M/S
ECHO AV PEAK GRADIENT: 21 MMHG
ECHO AV PEAK VELOCITY: 2.3 M/S
ECHO AV VELOCITY RATIO: 0.3
ECHO AV VTI: 40 CM
ECHO BSA: 2.32 M2
ECHO EST RA PRESSURE: 3 MMHG
ECHO LA DIAMETER INDEX: 1.55 CM/M2
ECHO LA DIAMETER: 3.5 CM
ECHO LA VOL A-L A4C: 118 ML (ref 18–58)
ECHO LA VOL MOD A4C: 110 ML (ref 18–58)
ECHO LA VOLUME INDEX A-L A4C: 52 ML/M2 (ref 16–34)
ECHO LA VOLUME INDEX MOD A4C: 49 ML/M2 (ref 16–34)
ECHO LV EDV A2C: 109 ML
ECHO LV EDV A4C: 91 ML
ECHO LV EDV BP: 103 ML (ref 67–155)
ECHO LV EDV INDEX A4C: 40 ML/M2
ECHO LV EDV INDEX BP: 46 ML/M2
ECHO LV EDV NDEX A2C: 48 ML/M2
ECHO LV EJECTION FRACTION A2C: 39 %
ECHO LV EJECTION FRACTION A4C: 48 %
ECHO LV EJECTION FRACTION BIPLANE: 45 % (ref 55–100)
ECHO LV ESV A2C: 66 ML
ECHO LV ESV A4C: 48 ML
ECHO LV ESV BP: 56 ML (ref 22–58)
ECHO LV ESV INDEX A2C: 29 ML/M2
ECHO LV ESV INDEX A4C: 21 ML/M2
ECHO LV ESV INDEX BP: 25 ML/M2
ECHO LV FRACTIONAL SHORTENING: 23 % (ref 28–44)
ECHO LV INTERNAL DIMENSION DIASTOLE INDEX: 1.77 CM/M2
ECHO LV INTERNAL DIMENSION DIASTOLIC: 4 CM (ref 4.2–5.9)
ECHO LV INTERNAL DIMENSION SYSTOLIC INDEX: 1.37 CM/M2
ECHO LV INTERNAL DIMENSION SYSTOLIC: 3.1 CM
ECHO LV IVSD: 1.6 CM (ref 0.6–1)
ECHO LV IVSS: 1.9 CM
ECHO LV MASS 2D: 284.5 G (ref 88–224)
ECHO LV MASS INDEX 2D: 125.9 G/M2 (ref 49–115)
ECHO LV POSTERIOR WALL DIASTOLIC: 1.8 CM (ref 0.6–1)
ECHO LV POSTERIOR WALL SYSTOLIC: 2.3 CM
ECHO LV RELATIVE WALL THICKNESS RATIO: 0.9
ECHO LVOT AREA: 3.1 CM2
ECHO LVOT AV VTI INDEX: 0.34
ECHO LVOT DIAM: 2 CM
ECHO LVOT MEAN GRADIENT: 1 MMHG
ECHO LVOT PEAK GRADIENT: 2 MMHG
ECHO LVOT PEAK VELOCITY: 0.7 M/S
ECHO LVOT STROKE VOLUME INDEX: 18.9 ML/M2
ECHO LVOT SV: 42.7 ML
ECHO LVOT VTI: 13.6 CM
ECHO MV "A" WAVE DURATION: 92.3 MSEC
ECHO MV A VELOCITY: 0.51 M/S
ECHO MV AREA PHT: 3.6 CM2
ECHO MV AREA VTI: 1.2 CM2
ECHO MV E DECELERATION TIME (DT): 202.5 MS
ECHO MV E VELOCITY: 1.47 M/S
ECHO MV E/A RATIO: 2.88
ECHO MV LVOT VTI INDEX: 2.54
ECHO MV MAX VELOCITY: 1.8 M/S
ECHO MV MEAN GRADIENT: 4 MMHG
ECHO MV MEAN VELOCITY: 0.9 M/S
ECHO MV PEAK GRADIENT: 13 MMHG
ECHO MV PRESSURE HALF TIME (PHT): 61.6 MS
ECHO MV VTI: 34.6 CM
ECHO PV MAX VELOCITY: 0.9 M/S
ECHO PV MEAN GRADIENT: 2 MMHG
ECHO PV MEAN VELOCITY: 0.6 M/S
ECHO PV PEAK GRADIENT: 3 MMHG
ECHO PV VTI: 13.7 CM
ECHO RIGHT VENTRICULAR SYSTOLIC PRESSURE (RVSP): 33 MMHG
ECHO RV INTERNAL DIMENSION: 4.1 CM
ECHO RV TAPSE: 1.2 CM (ref 1.7–?)
ECHO TV REGURGITANT MAX VELOCITY: 2.74 M/S
ECHO TV REGURGITANT PEAK GRADIENT: 30 MMHG
EOSINOPHIL # BLD: 0.16 K/UL (ref 0.05–0.5)
EOSINOPHILS RELATIVE PERCENT: 2 % (ref 0–6)
ERYTHROCYTE [DISTWIDTH] IN BLOOD BY AUTOMATED COUNT: 18.4 % (ref 11.5–15)
GFR, ESTIMATED: 35 ML/MIN/1.73M2
GLUCOSE SERPL-MCNC: 103 MG/DL (ref 74–99)
HCT VFR BLD AUTO: 30.1 % (ref 37–54)
HGB BLD-MCNC: 8.9 G/DL (ref 12.5–16.5)
IMM GRANULOCYTES # BLD AUTO: 0.03 K/UL (ref 0–0.58)
IMM GRANULOCYTES NFR BLD: 0 % (ref 0–5)
INR PPP: 1.4
LYMPHOCYTES NFR BLD: 0.95 K/UL (ref 1.5–4)
LYMPHOCYTES RELATIVE PERCENT: 14 % (ref 20–42)
MAGNESIUM SERPL-MCNC: 1.8 MG/DL (ref 1.6–2.6)
MCH RBC QN AUTO: 26.3 PG (ref 26–35)
MCHC RBC AUTO-ENTMCNC: 29.6 G/DL (ref 32–34.5)
MCV RBC AUTO: 89.1 FL (ref 80–99.9)
MICROORGANISM SPEC CULT: ABNORMAL
MONOCYTES NFR BLD: 0.75 K/UL (ref 0.1–0.95)
MONOCYTES NFR BLD: 11 % (ref 2–12)
NEUTROPHILS NFR BLD: 71 % (ref 43–80)
NEUTS SEG NFR BLD: 4.77 K/UL (ref 1.8–7.3)
PARTIAL THROMBOPLASTIN TIME: 110.8 SEC (ref 24.5–35.1)
PARTIAL THROMBOPLASTIN TIME: 45.8 SEC (ref 24.5–35.1)
PARTIAL THROMBOPLASTIN TIME: 49.2 SEC (ref 24.5–35.1)
PHOSPHATE SERPL-MCNC: 3.1 MG/DL (ref 2.5–4.5)
PLATELET # BLD AUTO: 98 K/UL (ref 130–450)
PLATELET CONFIRMATION: NORMAL
PMV BLD AUTO: 11.8 FL (ref 7–12)
POTASSIUM SERPL-SCNC: 4 MMOL/L (ref 3.5–5)
PROT SERPL-MCNC: 6.4 G/DL (ref 6.4–8.3)
PROTHROMBIN TIME: 15 SEC (ref 9.3–12.4)
RBC # BLD AUTO: 3.38 M/UL (ref 3.8–5.8)
SODIUM SERPL-SCNC: 135 MMOL/L (ref 132–146)
SPECIMEN DESCRIPTION: ABNORMAL
TRANSFUSION STATUS: NORMAL
TRANSFUSION STATUS: NORMAL
UNIT DIVISION: 0
UNIT DIVISION: 0
WBC OTHER # BLD: 6.7 K/UL (ref 4.5–11.5)

## 2024-05-12 PROCEDURE — 85730 THROMBOPLASTIN TIME PARTIAL: CPT

## 2024-05-12 PROCEDURE — 6360000002 HC RX W HCPCS

## 2024-05-12 PROCEDURE — 80053 COMPREHEN METABOLIC PANEL: CPT

## 2024-05-12 PROCEDURE — 2580000003 HC RX 258

## 2024-05-12 PROCEDURE — 99233 SBSQ HOSP IP/OBS HIGH 50: CPT | Performed by: INTERNAL MEDICINE

## 2024-05-12 PROCEDURE — 6360000002 HC RX W HCPCS: Performed by: STUDENT IN AN ORGANIZED HEALTH CARE EDUCATION/TRAINING PROGRAM

## 2024-05-12 PROCEDURE — A4216 STERILE WATER/SALINE, 10 ML: HCPCS

## 2024-05-12 PROCEDURE — 99233 SBSQ HOSP IP/OBS HIGH 50: CPT | Performed by: STUDENT IN AN ORGANIZED HEALTH CARE EDUCATION/TRAINING PROGRAM

## 2024-05-12 PROCEDURE — 6370000000 HC RX 637 (ALT 250 FOR IP): Performed by: STUDENT IN AN ORGANIZED HEALTH CARE EDUCATION/TRAINING PROGRAM

## 2024-05-12 PROCEDURE — 6370000000 HC RX 637 (ALT 250 FOR IP): Performed by: HOSPITALIST

## 2024-05-12 PROCEDURE — 85610 PROTHROMBIN TIME: CPT

## 2024-05-12 PROCEDURE — 2060000000 HC ICU INTERMEDIATE R&B

## 2024-05-12 PROCEDURE — 2580000003 HC RX 258: Performed by: INTERNAL MEDICINE

## 2024-05-12 PROCEDURE — 83880 ASSAY OF NATRIURETIC PEPTIDE: CPT

## 2024-05-12 PROCEDURE — 71045 X-RAY EXAM CHEST 1 VIEW: CPT

## 2024-05-12 PROCEDURE — 93306 TTE W/DOPPLER COMPLETE: CPT | Performed by: INTERNAL MEDICINE

## 2024-05-12 PROCEDURE — 84100 ASSAY OF PHOSPHORUS: CPT

## 2024-05-12 PROCEDURE — 85025 COMPLETE CBC W/AUTO DIFF WBC: CPT

## 2024-05-12 PROCEDURE — 6370000000 HC RX 637 (ALT 250 FOR IP): Performed by: INTERNAL MEDICINE

## 2024-05-12 PROCEDURE — C9113 INJ PANTOPRAZOLE SODIUM, VIA: HCPCS

## 2024-05-12 PROCEDURE — 36415 COLL VENOUS BLD VENIPUNCTURE: CPT

## 2024-05-12 PROCEDURE — 83735 ASSAY OF MAGNESIUM: CPT

## 2024-05-12 RX ORDER — WARFARIN SODIUM 5 MG/1
5 TABLET ORAL
Status: COMPLETED | OUTPATIENT
Start: 2024-05-12 | End: 2024-05-12

## 2024-05-12 RX ORDER — MIDODRINE HYDROCHLORIDE 5 MG/1
2.5 TABLET ORAL
Status: DISCONTINUED | OUTPATIENT
Start: 2024-05-12 | End: 2024-05-15

## 2024-05-12 RX ADMIN — LORAZEPAM 1 MG: 1 TABLET ORAL at 00:20

## 2024-05-12 RX ADMIN — SODIUM CHLORIDE, PRESERVATIVE FREE 10 ML: 5 INJECTION INTRAVENOUS at 19:59

## 2024-05-12 RX ADMIN — METOPROLOL SUCCINATE 25 MG: 25 TABLET, FILM COATED, EXTENDED RELEASE ORAL at 09:11

## 2024-05-12 RX ADMIN — HEPARIN SODIUM 8 UNITS/KG/HR: 10000 INJECTION, SOLUTION INTRAVENOUS at 09:13

## 2024-05-12 RX ADMIN — MIDODRINE HYDROCHLORIDE 2.5 MG: 5 TABLET ORAL at 19:59

## 2024-05-12 RX ADMIN — LORAZEPAM 3 MG: 2 INJECTION INTRAMUSCULAR; INTRAVENOUS at 20:01

## 2024-05-12 RX ADMIN — PHENOBARBITAL SODIUM 65 MG: 65 INJECTION INTRAMUSCULAR at 05:11

## 2024-05-12 RX ADMIN — SODIUM CHLORIDE, PRESERVATIVE FREE 10 ML: 5 INJECTION INTRAVENOUS at 08:55

## 2024-05-12 RX ADMIN — SODIUM CHLORIDE, PRESERVATIVE FREE 40 MG: 5 INJECTION INTRAVENOUS at 16:10

## 2024-05-12 RX ADMIN — ATORVASTATIN CALCIUM 20 MG: 20 TABLET, FILM COATED ORAL at 20:01

## 2024-05-12 RX ADMIN — SODIUM CHLORIDE, PRESERVATIVE FREE 40 MG: 5 INJECTION INTRAVENOUS at 05:11

## 2024-05-12 RX ADMIN — WARFARIN SODIUM 5 MG: 5 TABLET ORAL at 16:09

## 2024-05-12 RX ADMIN — LORAZEPAM 3 MG: 2 INJECTION INTRAMUSCULAR; INTRAVENOUS at 09:10

## 2024-05-12 RX ADMIN — HEPARIN SODIUM 2000 UNITS: 1000 INJECTION INTRAVENOUS; SUBCUTANEOUS at 21:00

## 2024-05-12 RX ADMIN — TRAZODONE HYDROCHLORIDE 50 MG: 50 TABLET ORAL at 20:03

## 2024-05-12 RX ADMIN — HEPARIN SODIUM 2000 UNITS: 1000 INJECTION INTRAVENOUS; SUBCUTANEOUS at 13:36

## 2024-05-12 RX ADMIN — SODIUM CHLORIDE, POTASSIUM CHLORIDE, SODIUM LACTATE AND CALCIUM CHLORIDE: 600; 310; 30; 20 INJECTION, SOLUTION INTRAVENOUS at 03:06

## 2024-05-12 RX ADMIN — RIFAXIMIN 550 MG: 550 TABLET ORAL at 19:59

## 2024-05-12 RX ADMIN — LACTULOSE 20 G: 10 SOLUTION ORAL at 09:19

## 2024-05-12 RX ADMIN — Medication 100 MG: at 09:11

## 2024-05-12 RX ADMIN — HEPARIN SODIUM 10 UNITS/KG/HR: 10000 INJECTION, SOLUTION INTRAVENOUS at 13:38

## 2024-05-12 RX ADMIN — PHENOBARBITAL SODIUM 65 MG: 65 INJECTION INTRAMUSCULAR at 16:10

## 2024-05-12 ASSESSMENT — PAIN SCALES - GENERAL
PAINLEVEL_OUTOF10: 0
PAINLEVEL_OUTOF10: 0

## 2024-05-12 NOTE — PATIENT CARE CONFERENCE
Intensive Care Daily Quality Rounding Checklist      ICU Team Members:     ICU Day #: NUMBER: 3    Intubation Date:  N/A    Ventilator Day #: N/A    Central Line Insertion Date:  N/A        Day #:         Indication:      Arterial Line Insertion Date:  N/A      Day #:     Temporary Hemodialysis Catheter Insertion Date: N/A       Day #     DVT Prophylaxis: Heparin gtt    GI Prophylaxis: Protonix    Culver Catheter Insertion Date: May 11       Day #: 2      Indications: Urinary retention      Continued need (if yes, reason documented and discussed with physician): yes, urinary retention, coude catheter in place    Skin Issues/ Wounds and ordered treatment discussed on rounds: No, SOS precautions    Goals/ Plans for the Day:  Monitor labs and vitals, replace/transfuse as needed. Continue critical care management.    Reviewed plan and goals for day with patient and/or representative:

## 2024-05-12 NOTE — PLAN OF CARE
Problem: Pain  Goal: Verbalizes/displays adequate comfort level or baseline comfort level  Outcome: Progressing     Problem: Skin/Tissue Integrity  Goal: Absence of new skin breakdown  Description: 1.  Monitor for areas of redness and/or skin breakdown  2.  Assess vascular access sites hourly  3.  Every 4-6 hours minimum:  Change oxygen saturation probe site  4.  Every 4-6 hours:  If on nasal continuous positive airway pressure, respiratory therapy assess nares and determine need for appliance change or resting period.  Outcome: Progressing     Problem: Safety - Adult  Goal: Free from fall injury  Outcome: Progressing     Problem: Discharge Planning  Goal: Discharge to home or other facility with appropriate resources  Outcome: Progressing     Problem: Chronic Conditions and Co-morbidities  Goal: Patient's chronic conditions and co-morbidity symptoms are monitored and maintained or improved  Outcome: Progressing     Problem: ABCDS Injury Assessment  Goal: Absence of physical injury  Outcome: Progressing     Problem: Nutrition Deficit:  Goal: Optimize nutritional status  5/12/2024 0035 by Boyd Torres RN  Outcome: Progressing  5/11/2024 1126 by Wilma Conway RD, CNSC, LD  Flowsheets (Taken 5/11/2024 1126)  Nutrient intake appropriate for improving, restoring, or maintaining nutritional needs:   Assess nutritional status and recommend course of action   Monitor oral intake, labs, and treatment plans   Recommend appropriate diets, oral nutritional supplements, and vitamin/mineral supplements

## 2024-05-13 PROBLEM — R41.0 DELIRIUM: Status: ACTIVE | Noted: 2024-05-13

## 2024-05-13 LAB
ALBUMIN SERPL-MCNC: 3.6 G/DL (ref 3.5–5.2)
ALP SERPL-CCNC: 151 U/L (ref 40–129)
ALT SERPL-CCNC: 7 U/L (ref 0–40)
ANION GAP SERPL CALCULATED.3IONS-SCNC: 11 MMOL/L (ref 7–16)
AST SERPL-CCNC: 17 U/L (ref 0–39)
BASOPHILS # BLD: 0.05 K/UL (ref 0–0.2)
BASOPHILS NFR BLD: 1 % (ref 0–2)
BILIRUB SERPL-MCNC: 0.9 MG/DL (ref 0–1.2)
BUN SERPL-MCNC: 25 MG/DL (ref 6–20)
CALCIUM SERPL-MCNC: 8.8 MG/DL (ref 8.6–10.2)
CHLORIDE SERPL-SCNC: 104 MMOL/L (ref 98–107)
CO2 SERPL-SCNC: 22 MMOL/L (ref 22–29)
CREAT SERPL-MCNC: 2.1 MG/DL (ref 0.7–1.2)
CREAT UR-MCNC: 91.4 MG/DL (ref 40–278)
EKG ATRIAL RATE: 72 BPM
EKG P AXIS: 62 DEGREES
EKG P-R INTERVAL: 208 MS
EKG Q-T INTERVAL: 470 MS
EKG QRS DURATION: 180 MS
EKG QTC CALCULATION (BAZETT): 514 MS
EKG R AXIS: -46 DEGREES
EKG T AXIS: 113 DEGREES
EKG VENTRICULAR RATE: 72 BPM
EOSINOPHIL # BLD: 0.11 K/UL (ref 0.05–0.5)
EOSINOPHILS RELATIVE PERCENT: 2 % (ref 0–6)
ERYTHROCYTE [DISTWIDTH] IN BLOOD BY AUTOMATED COUNT: 18.6 % (ref 11.5–15)
GFR, ESTIMATED: 36 ML/MIN/1.73M2
GLUCOSE SERPL-MCNC: 107 MG/DL (ref 74–99)
HCT VFR BLD AUTO: 31.7 % (ref 37–54)
HGB BLD-MCNC: 9.1 G/DL (ref 12.5–16.5)
INR PPP: 1.7
LYMPHOCYTES NFR BLD: 0.96 K/UL (ref 1.5–4)
LYMPHOCYTES RELATIVE PERCENT: 16 % (ref 20–42)
MAGNESIUM SERPL-MCNC: 1.7 MG/DL (ref 1.6–2.6)
MCH RBC QN AUTO: 26.4 PG (ref 26–35)
MCHC RBC AUTO-ENTMCNC: 28.7 G/DL (ref 32–34.5)
MCV RBC AUTO: 91.9 FL (ref 80–99.9)
MICROALBUMIN UR-MCNC: 134 MG/L (ref 0–19)
MICROALBUMIN/CREAT UR-RTO: 146 MCG/MG CREAT (ref 0–30)
MONOCYTES NFR BLD: 0.37 K/UL (ref 0.1–0.95)
MONOCYTES NFR BLD: 6 % (ref 2–12)
NEUTROPHILS NFR BLD: 75 % (ref 43–80)
NEUTS SEG NFR BLD: 4.6 K/UL (ref 1.8–7.3)
PARTIAL THROMBOPLASTIN TIME: 54.9 SEC (ref 24.5–35.1)
PARTIAL THROMBOPLASTIN TIME: 63.5 SEC (ref 24.5–35.1)
PARTIAL THROMBOPLASTIN TIME: 81.8 SEC (ref 24.5–35.1)
PHOSPHATE SERPL-MCNC: 3.1 MG/DL (ref 2.5–4.5)
PLATELET # BLD AUTO: 85 K/UL (ref 130–450)
PLATELET CONFIRMATION: NORMAL
PMV BLD AUTO: 12.3 FL (ref 7–12)
POTASSIUM SERPL-SCNC: 4.2 MMOL/L (ref 3.5–5)
PROT SERPL-MCNC: 6.5 G/DL (ref 6.4–8.3)
PROTHROMBIN TIME: 18.8 SEC (ref 9.3–12.4)
RBC # BLD AUTO: 3.45 M/UL (ref 3.8–5.8)
RBC # BLD: ABNORMAL 10*6/UL
SODIUM SERPL-SCNC: 137 MMOL/L (ref 132–146)
WBC OTHER # BLD: 6.1 K/UL (ref 4.5–11.5)

## 2024-05-13 PROCEDURE — 2060000000 HC ICU INTERMEDIATE R&B

## 2024-05-13 PROCEDURE — 99233 SBSQ HOSP IP/OBS HIGH 50: CPT | Performed by: STUDENT IN AN ORGANIZED HEALTH CARE EDUCATION/TRAINING PROGRAM

## 2024-05-13 PROCEDURE — 6360000002 HC RX W HCPCS: Performed by: STUDENT IN AN ORGANIZED HEALTH CARE EDUCATION/TRAINING PROGRAM

## 2024-05-13 PROCEDURE — 6370000000 HC RX 637 (ALT 250 FOR IP): Performed by: STUDENT IN AN ORGANIZED HEALTH CARE EDUCATION/TRAINING PROGRAM

## 2024-05-13 PROCEDURE — 6360000002 HC RX W HCPCS

## 2024-05-13 PROCEDURE — C9113 INJ PANTOPRAZOLE SODIUM, VIA: HCPCS

## 2024-05-13 PROCEDURE — 85610 PROTHROMBIN TIME: CPT

## 2024-05-13 PROCEDURE — 2580000003 HC RX 258: Performed by: INTERNAL MEDICINE

## 2024-05-13 PROCEDURE — 93010 ELECTROCARDIOGRAM REPORT: CPT | Performed by: INTERNAL MEDICINE

## 2024-05-13 PROCEDURE — 6370000000 HC RX 637 (ALT 250 FOR IP): Performed by: INTERNAL MEDICINE

## 2024-05-13 PROCEDURE — 90792 PSYCH DIAG EVAL W/MED SRVCS: CPT

## 2024-05-13 PROCEDURE — 93005 ELECTROCARDIOGRAM TRACING: CPT

## 2024-05-13 PROCEDURE — 85730 THROMBOPLASTIN TIME PARTIAL: CPT

## 2024-05-13 PROCEDURE — 85025 COMPLETE CBC W/AUTO DIFF WBC: CPT

## 2024-05-13 PROCEDURE — 84100 ASSAY OF PHOSPHORUS: CPT

## 2024-05-13 PROCEDURE — 6370000000 HC RX 637 (ALT 250 FOR IP): Performed by: HOSPITALIST

## 2024-05-13 PROCEDURE — 83735 ASSAY OF MAGNESIUM: CPT

## 2024-05-13 PROCEDURE — 6370000000 HC RX 637 (ALT 250 FOR IP)

## 2024-05-13 PROCEDURE — 82570 ASSAY OF URINE CREATININE: CPT

## 2024-05-13 PROCEDURE — 82043 UR ALBUMIN QUANTITATIVE: CPT

## 2024-05-13 PROCEDURE — 2580000003 HC RX 258

## 2024-05-13 PROCEDURE — 6370000000 HC RX 637 (ALT 250 FOR IP): Performed by: NURSE PRACTITIONER

## 2024-05-13 PROCEDURE — A4216 STERILE WATER/SALINE, 10 ML: HCPCS

## 2024-05-13 PROCEDURE — 80053 COMPREHEN METABOLIC PANEL: CPT

## 2024-05-13 PROCEDURE — 99233 SBSQ HOSP IP/OBS HIGH 50: CPT | Performed by: INTERNAL MEDICINE

## 2024-05-13 RX ORDER — MIRTAZAPINE 15 MG/1
7.5 TABLET, FILM COATED ORAL NIGHTLY
Status: DISCONTINUED | OUTPATIENT
Start: 2024-05-13 | End: 2024-05-19 | Stop reason: HOSPADM

## 2024-05-13 RX ORDER — WARFARIN SODIUM 4 MG/1
4 TABLET ORAL
Status: COMPLETED | OUTPATIENT
Start: 2024-05-13 | End: 2024-05-13

## 2024-05-13 RX ORDER — LANOLIN ALCOHOL/MO/W.PET/CERES
3 CREAM (GRAM) TOPICAL EVERY EVENING
Status: DISCONTINUED | OUTPATIENT
Start: 2024-05-13 | End: 2024-05-19 | Stop reason: HOSPADM

## 2024-05-13 RX ORDER — BUSPIRONE HYDROCHLORIDE 5 MG/1
5 TABLET ORAL 3 TIMES DAILY
Status: DISCONTINUED | OUTPATIENT
Start: 2024-05-13 | End: 2024-05-19 | Stop reason: HOSPADM

## 2024-05-13 RX ADMIN — PHENOBARBITAL SODIUM 65 MG: 65 INJECTION INTRAMUSCULAR at 18:02

## 2024-05-13 RX ADMIN — LORAZEPAM 3 MG: 2 INJECTION INTRAMUSCULAR; INTRAVENOUS at 08:54

## 2024-05-13 RX ADMIN — LORAZEPAM 2 MG: 2 INJECTION INTRAMUSCULAR; INTRAVENOUS at 13:00

## 2024-05-13 RX ADMIN — MIDODRINE HYDROCHLORIDE 2.5 MG: 5 TABLET ORAL at 12:10

## 2024-05-13 RX ADMIN — SODIUM CHLORIDE, PRESERVATIVE FREE 10 ML: 5 INJECTION INTRAVENOUS at 09:09

## 2024-05-13 RX ADMIN — SODIUM CHLORIDE, PRESERVATIVE FREE 40 MG: 5 INJECTION INTRAVENOUS at 04:51

## 2024-05-13 RX ADMIN — SODIUM CHLORIDE, PRESERVATIVE FREE 40 MG: 5 INJECTION INTRAVENOUS at 18:02

## 2024-05-13 RX ADMIN — LACTULOSE 20 G: 10 SOLUTION ORAL at 08:49

## 2024-05-13 RX ADMIN — SODIUM CHLORIDE, PRESERVATIVE FREE 10 ML: 5 INJECTION INTRAVENOUS at 20:08

## 2024-05-13 RX ADMIN — MIDODRINE HYDROCHLORIDE 2.5 MG: 5 TABLET ORAL at 08:48

## 2024-05-13 RX ADMIN — HEPARIN SODIUM 10 UNITS/KG/HR: 10000 INJECTION, SOLUTION INTRAVENOUS at 05:50

## 2024-05-13 RX ADMIN — Medication 100 MG: at 08:49

## 2024-05-13 RX ADMIN — LORAZEPAM 3 MG: 2 INJECTION INTRAMUSCULAR; INTRAVENOUS at 03:03

## 2024-05-13 RX ADMIN — RIFAXIMIN 550 MG: 550 TABLET ORAL at 09:08

## 2024-05-13 RX ADMIN — LORAZEPAM 3 MG: 2 INJECTION INTRAMUSCULAR; INTRAVENOUS at 00:00

## 2024-05-13 RX ADMIN — PHENOBARBITAL SODIUM 65 MG: 65 INJECTION INTRAMUSCULAR at 04:51

## 2024-05-13 RX ADMIN — LORAZEPAM 4 MG: 2 INJECTION INTRAMUSCULAR; INTRAVENOUS at 20:08

## 2024-05-13 RX ADMIN — LORAZEPAM 2 MG: 2 INJECTION INTRAMUSCULAR; INTRAVENOUS at 17:30

## 2024-05-13 RX ADMIN — Medication 3 MG: at 18:03

## 2024-05-13 RX ADMIN — WARFARIN SODIUM 4 MG: 4 TABLET ORAL at 18:41

## 2024-05-13 ASSESSMENT — PAIN SCALES - GENERAL
PAINLEVEL_OUTOF10: 0
PAINLEVEL_OUTOF10: 4

## 2024-05-13 ASSESSMENT — PAIN DESCRIPTION - LOCATION: LOCATION: CHEST

## 2024-05-13 NOTE — CONSULTS
[facility]   Inpatient CHF Nurse Navigator Consult      Cardiologist: Dr Akash Dorsey is a 58 y.o. (1965) male with a history of HFmrEF, most recent EF:  Lab Results   Component Value Date    LVEF 50 07/17/2023     05/10/2024 EF 40-45%    Patient was awake and alert, laying in bed during the consultation and is agreeable to heart failure education. He was engaged and asked appropriate questions throughout the education session. He states he is feeling better but still seems somewhat confused. He has had multiple no show appointments in the CHF clinic. I will attempt to reschedule him.    Barriers identified during consult contributing to HF Hospitalization:  [x] Limited medication adherence   [x] Poor health literacy, education regarding HF medications provided   [] Pill box provided to patient  [] Difficulty affording medications  [] Difficulty obtaining/ managing medications  [] Prescription assistance information given     [x] Not weighing themselves daily  [x] Weight log provided for easy monitoring  [] Scale provided     [x] Not following low sodium diet  [] Food insecurity   [x] 2 gram sodium diet education provided   [] Low sodium recipes provided  [] Sodium free seasoning provided   [] Low sodium meal delivery options given to patient  [] Dietician consulted     [] Lack of transportation to appointments     [] Depression, given chronic illness  [] Primary team notified     [] Goals of care need addressed  [] Palliative care consulted     [] CHF CHW consulted, to assist with       Chart Reviewed:  Diet: ADULT DIET; Regular; Low Sodium (2 gm)  ADULT ORAL NUTRITION SUPPLEMENT; Breakfast; Clear Liquid Oral Supplement  ADULT ORAL NUTRITION SUPPLEMENT; Lunch, Dinner; Frozen Oral Supplement   Daily Weights: Patient Vitals for the past 96 hrs (Last 3 readings):   Weight   05/13/24 0028 108.7 kg (239 lb 9.6 oz)   05/12/24 0600 109.8 kg (242 lb 1 oz)   05/11/24 
      Inpatient Cardiology Consultation      Reason for Consult:  Chest Pain     Consulting Physician: Dr Hicks     Requesting Physician:  Dr. Lebron    Date of Consultation: 5/8/2024    HISTORY OF PRESENT ILLNESS:   Len Dorsey  is a 58 y.o.  male known to [facility] cardiology Dr Bustos - seen in office 5/23/23  Seen most recently by Dr. Lyles during inpatient admission 2/24/2024.    PMH: see below  Admit 2/27/2024 - 3/5/2024: Acute heart failure diuresed with Bumex drip transition to p.o.  FRANTZ on CKD. Seen by Dr Lyles  Discharge cardiac medications: Aspirin 81 mg, atorvastatin 20 mg, Bumex 2 mg daily, Jardiance 10 mg daily, Toprol-XL 25 mg twice daily, spironolactone 25 mg daily Coumadin 2.5 mg daily  [facility] ED - CP / ETOH problem--has not taken Coumadin in 3 days.  No food for 3 days.  Arrival vitals: T98.4 RR 28 HR 95 /71 SpO2 90% on room air 3 L SpO2 100% weight 240 pounds BMI 34.5  Significant Labs: Troponin 54-46 proBNP 829 D-dimer less than 200 BUN 16 CR 1.4>> BUN 18 CR 1.5 K4.8 lactic acid 2.7-1.1.  K4.8 albumin 3.7 alk phos 186 total bilirubin 1.4 lipase 20 WBC 5.0 Hgb 9.4>> 9.1>> 8.8 >> 94   INR greater than 10 >> vitamin K 5 mg IV>> repeat 4.7  urine drug screen negative  alcohol level less than 10 urine drug screen negative  RAD: CT of head without contrast: No acute abnormality.  Portable chest x-ray: No acute process.  Cardiomegaly.  C. difficile toxin pending  ED treatment: Thiamine, normal saline bolus 500 mL, Zofran, Ativan  GI consulted for GIB/alcohol abuse  Psych consulted for depression/alcohol abuse  Social work consulted for alcohol rehab  Patient seen and examined.  Recent vitals: T98.1 RR 20 HR 83 /82 SpO2 100% on 2 L nasal cannula oxygen  Urine output 1300 mL thus far    Patient awakens to verbal stimuli and then quickly falls asleep.  Unable to provide accurate history.  Currently denying chest pain or shortness of breath.    Please note: past medical records were 
CONSULT  Federico Riddle M.D.  [facility]  Dr. Tejinder Subramanian M.D.,  Dr. Roly Pinzon M.D.,  Dr. Osmany Rdz D.O.,  Dr. Min Mcdaniels D.O. ,  Dr. Lance Lassiter M.D.,      Len Tomass  58 y.o.  male      Re: \"GI bleed/liver cirrhosis\"  Requesting physician: Dr. Lebron  Date:1:07 PM 5/8/2024      HPI: 58-year-old male patient seen in the hospital for above described issue.  Patient apparently has history of alcoholic cirrhosis with unknown follow-up.  Patient is very lethargic secondary to CIWA/Ativan protocol.  Patient cannot provide any meaningful history or review of systems.  According to the notes patient has been experiencing chest pain with also nausea vomiting and supposedly hematemesis.  Patient has history of anticoagulation with warfarin -mentioning of valve replacement inpatient history as well as mentioning of stroke.  On presentation to be anemic with hemoglobin of 9.4 decreasing to 8.6 earlier today.  Previous hemoglobin 10.4 on 3 March of this year.  Platelet count is 94 and white blood cell count is 5.  INR initially on presentation is over 10 and decreasing to 4.7 earlier today.  Patient apparently has received vitamin K from admitting service.  No viral hepatitis serology or AFP available for review.  EMR review indicates patient having endoscopy (EGD and colonoscopy) 8/24/2022 per general surgery revealing gastritis and duodenitis but esophagus without remark of masses or varices.  Colonoscopy reported large internal hemorrhoids with colonoscopy to the cecum revealing 2 polyps in the transverse colon and 2 polyps in the descending colon which were removed.  Pathology from above described polyps is reported to show tubular adenoma with second biopsy reported to show no mucosal tissue (fecal material only)..     Information sources:   -Patient (very limited)  -medical record  -health care team    PMHx:  Past Medical History:   Diagnosis Date    Alcoholic liver disease (HCC)  
Consult received for \"depression/alcohol abuse.\"     Chart reviewed and case discussed with Dr. Main. Patient admitted on 5/7 with complaints of shortness of breath and chest pain. Patient also disclosed he had been on \"an alcohol weinstein\" for the past 3 weeks, consuming 3 40 oz bottles of malt liquor daily. Patient noted to have a past psychiatric history of depression. He is on CIWA and Ativan protocol.     Spoke with nursing who reports patient has been cooperative with care. He is not suicidal. Peer Support already following patient. He is requesting rehab for his alcoholism at discharge. Attempted to complete consult via telehealth but patient lethargic and denied having any needs for psychiatry services at this time. No indication for psychiatric admission.     Patient may follow-up with our outpatient office, [facility], for his mental health needs and medication management if interested. Appointments can be made by calling [phone]. No further recommendations from psychiatry at this time. Please re-consult us in the future if any acute psychiatric concerns arise during hospitalization. Thank you.     Electronically signed by ARNALDO Stoll CNP on 5/8/2024 at 12:56 PM    
Department of Internal Medicine  Nephrology Nurse Practitioner Consult Note      Reason for Consult:  FRANTZ  Requesting Physician:  Rubi Gross DO     CHIEF COMPLAINT:  chest pain     History Obtained From:  patient, electronic medical record    HISTORY OF PRESENT ILLNESS:  Briefly Mr. Dorsey is a 58-year-old man with history of CKD stage IIIa, with large proteinuria, probably due to nephrosclerosis, baseline creatinine 1.5-1.7 mg/dL, HTN, CAD status post CABG, HFpEF 55%, valvular heart disease status post AVR and MVR both mechanical valves, post pacemaker placement, hyperlipidemia, CVA, testicular cancer status postchemotherapy, alcohol abuse, fatty liver, who was admitted on 5/7/2024 for shortness of breath which she states has been going on for nearly a month.  Creatinine on admission 1.4 mg/dL has increased to 2.1 mg/dL, reason for this consultation.    Past Medical History:        Diagnosis Date    Alcoholic liver disease (HCC)     H/O prosthetic aortic valve replacement 09/2020    [facility]  Mitral and pacemaker at this time as well    History of mitral valve replacement 09/2020    at [facility]  Aortic Valve placed at this time as well as pacemaker    Stroke (HCC)     Testicular cancer (HCC)     in remission since 1988     Past Surgical History:        Procedure Laterality Date    COLONOSCOPY N/A 8/24/2022    COLONOSCOPY POLYPECTOMY HOT BIOPSY performed by Rene Lorenzo MD at [facility] ENDOSCOPY    FRACTURE SURGERY      Left Tibial Plateau Fracture 3/29/2022     PACEMAKER PLACEMENT  09/2020    UPPER GASTROINTESTINAL ENDOSCOPY N/A 8/24/2022    EGD DIAGNOSTIC ONLY performed by Rene Lorenzo MD at [facility] ENDOSCOPY    UPPER GASTROINTESTINAL ENDOSCOPY N/A 5/9/2024    ESOPHAGOGASTRODUODENOSCOPY          +++AVAIL 1430+++ performed by Min Mcdaniels DO at [facility] ENDOSCOPY     Current Medications:    Current Facility-Administered Medications: midodrine (PROAMATINE) tablet 2.5 mg, 2.5 mg, Oral, TID 
contains a 7 x 6 x 9 mm cystic lesion in the lower pole.  Kidney demonstrates echogenic regions in the pyramids, consider nephrocalcinosis.  The right kidney measures 10.3 x 5.9 x 5.2 cm. PANCREAS:  Pancreas not well visualized. OTHER: No evidence of right upper quadrant ascites Limited abdominal ultrasound for ascites: 4 quadrant survey demonstrated no evidence of abdominal ascites.     1. Suboptimal visualization of the liver. Left lobe poorly visualized due to body habitus and patient positioning. 2. Mildly heterogeneous liver echogenicity. 3. Cholelithiasis. 4. Right renal cyst. 5. Echogenic regions in the pyramids of the right kidney, consider nephrocalcinosis. 6. No evidence of abdominal ascites. 7. Main portal vein velocity slightly elevated at 48 cm/second.  Flow is hepatopetal.     US DUP ABD PEL RETRO SCROT COMPLETE    Result Date: 5/8/2024  EXAMINATION: RIGHT UPPER QUADRANT ULTRASOUND 5/8/2024 1:55 pm COMPARISON: None. HISTORY: ORDERING SYSTEM PROVIDED HISTORY: Cirrhosis -rule out mass TECHNOLOGIST PROVIDED HISTORY: Reason for exam:->Cirrhosis -rule out mass What reading provider will be dictating this exam?->CRC FINDINGS: LIVER:  Liver is suboptimally visualized.  Left lobe poorly visualized due to body habitus and patient positioning.  Liver echogenicity is mildly heterogeneous. Portal vein is patent with a normal direction of flow.  Main portal vein velocity is 48 cm/second, mildly increased. Right and left portal veins appear patent. Hepatic veins are patent with a normal direction of flow and normal flow pulsatility. BILIARY SYSTEM:  There is evidence of cholelithiasis. Common bile duct is within normal limits measuring 4 mm. RIGHT KIDNEY: Right kidney contains a 7 x 6 x 9 mm cystic lesion in the lower pole.  Kidney demonstrates echogenic regions in the pyramids, consider nephrocalcinosis.  The right kidney measures 10.3 x 5.9 x 5.2 cm. PANCREAS:  Pancreas not well visualized. OTHER: No evidence of 
consultation appreciated    Renal  CKD  Baseline creatinine ~1.5  Monitor CMP  IV fluid bolus given in ED  LR at 75cc/hr      Infectious disease  No acute complications    Heme/Onc  Anemia  No acute signs of bleeding on EGD  Monitor hemoglobin daily  Transfuse if hemoglobin <7    Thrombocytopenia  Due to liver disease  Follow platelets daily  Transfuse as needed    Endocrine  No acute complications    Social/Spiritual/DNR/Other  Code status: Full  Diet ADULT DIET; Regular; Low Sodium (2 gm)  Stress ulcer prophylaxis: Protonix  DVT prophylaxis:  Heparin drip  Consultations needed: Yes  Transfer out of ICU today? No    Lines/catheters  Peripheral IVs      Grace Dryer, DO  5:36 PM  05/10/24     I personally saw, examined and provided care for the patient. Radiographs, labs and medication list were reviewed by me independently. I spoke with bedside nursing, therapists and consultants. Critical care services and times documented are independent of procedures and multidisciplinary rounds with Residents. Additionally comprehensive, multidisciplinary rounds were conducted with the MICU team. The case was discussed in detail and plans for care were established. Review of Residents documentation was conducted and revisions were made as appropriate. I agree with the above documented exam, problem list and plan of care.    37 minutes of CCT spent with the patient, reviewing the chart including imaging studies, and discussing the case with other health care professionals.  This time excludes procedures.     During multidisciplinary team rounds the patient was seen, examined and discussed. This is confirmation that I have personally seen and examined the patient and that the key elements of the encounter were performed by me (> 85 % time).  The medications & laboratory data and imagery was discussed and adjusted where necessary. Key issues of the case were discussed among consultants.     This patient has a high probability of

## 2024-05-13 NOTE — PLAN OF CARE
Problem: Pain  Goal: Verbalizes/displays adequate comfort level or baseline comfort level  Outcome: Progressing     Problem: Skin/Tissue Integrity  Goal: Absence of new skin breakdown  Description: 1.  Monitor for areas of redness and/or skin breakdown  2.  Assess vascular access sites hourly  3.  Every 4-6 hours minimum:  Change oxygen saturation probe site  4.  Every 4-6 hours:  If on nasal continuous positive airway pressure, respiratory therapy assess nares and determine need for appliance change or resting period.  Outcome: Progressing     Problem: Safety - Adult  Goal: Free from fall injury  Outcome: Progressing     Problem: Discharge Planning  Goal: Discharge to home or other facility with appropriate resources  Outcome: Progressing     Problem: Chronic Conditions and Co-morbidities  Goal: Patient's chronic conditions and co-morbidity symptoms are monitored and maintained or improved  Outcome: Progressing     Problem: ABCDS Injury Assessment  Goal: Absence of physical injury  Outcome: Progressing     Problem: Nutrition Deficit:  Goal: Optimize nutritional status  Outcome: Progressing

## 2024-05-14 ENCOUNTER — APPOINTMENT (OUTPATIENT)
Dept: GENERAL RADIOLOGY | Age: 59
DRG: 377 | End: 2024-05-14
Payer: MEDICARE

## 2024-05-14 LAB
ALBUMIN SERPL-MCNC: 3.9 G/DL (ref 3.5–5.2)
ALP SERPL-CCNC: 159 U/L (ref 40–129)
ALT SERPL-CCNC: 8 U/L (ref 0–40)
ANION GAP SERPL CALCULATED.3IONS-SCNC: 11 MMOL/L (ref 7–16)
AST SERPL-CCNC: 17 U/L (ref 0–39)
BASOPHILS # BLD: 0.03 K/UL (ref 0–0.2)
BASOPHILS NFR BLD: 1 % (ref 0–2)
BILIRUB SERPL-MCNC: 1.2 MG/DL (ref 0–1.2)
BUN SERPL-MCNC: 19 MG/DL (ref 6–20)
CALCIUM SERPL-MCNC: 9.2 MG/DL (ref 8.6–10.2)
CHLORIDE SERPL-SCNC: 106 MMOL/L (ref 98–107)
CHLORIDE UR-SCNC: 61 MMOL/L
CO2 SERPL-SCNC: 24 MMOL/L (ref 22–29)
CREAT SERPL-MCNC: 1.8 MG/DL (ref 0.7–1.2)
CREAT UR-MCNC: 78.9 MG/DL (ref 40–278)
CREAT UR-MCNC: 80.5 MG/DL (ref 40–278)
EOSINOPHIL # BLD: 0.14 K/UL (ref 0.05–0.5)
EOSINOPHILS RELATIVE PERCENT: 3 % (ref 0–6)
ERYTHROCYTE [DISTWIDTH] IN BLOOD BY AUTOMATED COUNT: 18.4 % (ref 11.5–15)
GFR, ESTIMATED: 45 ML/MIN/1.73M2
GLUCOSE SERPL-MCNC: 86 MG/DL (ref 74–99)
HCT VFR BLD AUTO: 30.5 % (ref 37–54)
HGB BLD-MCNC: 9 G/DL (ref 12.5–16.5)
IMM GRANULOCYTES # BLD AUTO: <0.03 K/UL (ref 0–0.58)
IMM GRANULOCYTES NFR BLD: 0 % (ref 0–5)
INR PPP: 1.9
LYMPHOCYTES NFR BLD: 0.83 K/UL (ref 1.5–4)
LYMPHOCYTES RELATIVE PERCENT: 15 % (ref 20–42)
MAGNESIUM SERPL-MCNC: 1.7 MG/DL (ref 1.6–2.6)
MCH RBC QN AUTO: 26.1 PG (ref 26–35)
MCHC RBC AUTO-ENTMCNC: 29.5 G/DL (ref 32–34.5)
MCV RBC AUTO: 88.4 FL (ref 80–99.9)
MONOCYTES NFR BLD: 0.76 K/UL (ref 0.1–0.95)
MONOCYTES NFR BLD: 14 % (ref 2–12)
NEUTROPHILS NFR BLD: 67 % (ref 43–80)
NEUTS SEG NFR BLD: 3.63 K/UL (ref 1.8–7.3)
PARTIAL THROMBOPLASTIN TIME: 60.6 SEC (ref 24.5–35.1)
PHOSPHATE SERPL-MCNC: 3.3 MG/DL (ref 2.5–4.5)
PLATELET # BLD AUTO: 90 K/UL (ref 130–450)
PLATELET CONFIRMATION: NORMAL
PMV BLD AUTO: 12.2 FL (ref 7–12)
POTASSIUM SERPL-SCNC: 4.1 MMOL/L (ref 3.5–5)
POTASSIUM, UR: 14.3 MMOL/L
PROT SERPL-MCNC: 7.2 G/DL (ref 6.4–8.3)
PROTHROMBIN TIME: 20.5 SEC (ref 9.3–12.4)
RBC # BLD AUTO: 3.45 M/UL (ref 3.8–5.8)
SODIUM SERPL-SCNC: 141 MMOL/L (ref 132–146)
SODIUM UR-SCNC: 106 MMOL/L
SURGICAL PATHOLOGY REPORT: NORMAL
TOTAL PROTEIN, URINE: 25 MG/DL (ref 0–12)
URINE TOTAL PROTEIN CREATININE RATIO: 0.31 (ref 0–0.2)
WBC OTHER # BLD: 5.4 K/UL (ref 4.5–11.5)

## 2024-05-14 PROCEDURE — 6360000002 HC RX W HCPCS: Performed by: STUDENT IN AN ORGANIZED HEALTH CARE EDUCATION/TRAINING PROGRAM

## 2024-05-14 PROCEDURE — 6360000002 HC RX W HCPCS

## 2024-05-14 PROCEDURE — 36415 COLL VENOUS BLD VENIPUNCTURE: CPT

## 2024-05-14 PROCEDURE — 6370000000 HC RX 637 (ALT 250 FOR IP)

## 2024-05-14 PROCEDURE — C9113 INJ PANTOPRAZOLE SODIUM, VIA: HCPCS

## 2024-05-14 PROCEDURE — 82436 ASSAY OF URINE CHLORIDE: CPT

## 2024-05-14 PROCEDURE — 6370000000 HC RX 637 (ALT 250 FOR IP): Performed by: CLINICAL NURSE SPECIALIST

## 2024-05-14 PROCEDURE — 85610 PROTHROMBIN TIME: CPT

## 2024-05-14 PROCEDURE — 85025 COMPLETE CBC W/AUTO DIFF WBC: CPT

## 2024-05-14 PROCEDURE — 83735 ASSAY OF MAGNESIUM: CPT

## 2024-05-14 PROCEDURE — 6370000000 HC RX 637 (ALT 250 FOR IP): Performed by: INTERNAL MEDICINE

## 2024-05-14 PROCEDURE — 99233 SBSQ HOSP IP/OBS HIGH 50: CPT | Performed by: INTERNAL MEDICINE

## 2024-05-14 PROCEDURE — A4216 STERILE WATER/SALINE, 10 ML: HCPCS

## 2024-05-14 PROCEDURE — 6370000000 HC RX 637 (ALT 250 FOR IP): Performed by: HOSPITALIST

## 2024-05-14 PROCEDURE — 6370000000 HC RX 637 (ALT 250 FOR IP): Performed by: STUDENT IN AN ORGANIZED HEALTH CARE EDUCATION/TRAINING PROGRAM

## 2024-05-14 PROCEDURE — 6370000000 HC RX 637 (ALT 250 FOR IP): Performed by: NURSE PRACTITIONER

## 2024-05-14 PROCEDURE — 2060000000 HC ICU INTERMEDIATE R&B

## 2024-05-14 PROCEDURE — 71045 X-RAY EXAM CHEST 1 VIEW: CPT

## 2024-05-14 PROCEDURE — 84100 ASSAY OF PHOSPHORUS: CPT

## 2024-05-14 PROCEDURE — 82570 ASSAY OF URINE CREATININE: CPT

## 2024-05-14 PROCEDURE — 85730 THROMBOPLASTIN TIME PARTIAL: CPT

## 2024-05-14 PROCEDURE — 84156 ASSAY OF PROTEIN URINE: CPT

## 2024-05-14 PROCEDURE — 2580000003 HC RX 258

## 2024-05-14 PROCEDURE — 2580000003 HC RX 258: Performed by: INTERNAL MEDICINE

## 2024-05-14 PROCEDURE — 80053 COMPREHEN METABOLIC PANEL: CPT

## 2024-05-14 PROCEDURE — 84300 ASSAY OF URINE SODIUM: CPT

## 2024-05-14 PROCEDURE — 84133 ASSAY OF URINE POTASSIUM: CPT

## 2024-05-14 RX ORDER — WARFARIN SODIUM 5 MG/1
5 TABLET ORAL
Status: COMPLETED | OUTPATIENT
Start: 2024-05-14 | End: 2024-05-14

## 2024-05-14 RX ADMIN — BUSPIRONE HYDROCHLORIDE 5 MG: 5 TABLET ORAL at 09:59

## 2024-05-14 RX ADMIN — ISOSORBIDE DINITRATE 5 MG: 10 TABLET ORAL at 15:46

## 2024-05-14 RX ADMIN — SODIUM CHLORIDE, PRESERVATIVE FREE 40 MG: 5 INJECTION INTRAVENOUS at 04:26

## 2024-05-14 RX ADMIN — METOPROLOL SUCCINATE 25 MG: 25 TABLET, FILM COATED, EXTENDED RELEASE ORAL at 22:20

## 2024-05-14 RX ADMIN — SODIUM CHLORIDE, PRESERVATIVE FREE 40 MG: 5 INJECTION INTRAVENOUS at 15:53

## 2024-05-14 RX ADMIN — PHENOBARBITAL SODIUM 65 MG: 65 INJECTION INTRAMUSCULAR at 15:52

## 2024-05-14 RX ADMIN — HEPARIN SODIUM 10 UNITS/KG/HR: 10000 INJECTION, SOLUTION INTRAVENOUS at 03:50

## 2024-05-14 RX ADMIN — MIDODRINE HYDROCHLORIDE 2.5 MG: 5 TABLET ORAL at 09:59

## 2024-05-14 RX ADMIN — MIDODRINE HYDROCHLORIDE 2.5 MG: 5 TABLET ORAL at 15:46

## 2024-05-14 RX ADMIN — Medication 3 MG: at 17:04

## 2024-05-14 RX ADMIN — LORAZEPAM 3 MG: 2 INJECTION INTRAMUSCULAR; INTRAVENOUS at 01:13

## 2024-05-14 RX ADMIN — ATORVASTATIN CALCIUM 20 MG: 20 TABLET, FILM COATED ORAL at 22:20

## 2024-05-14 RX ADMIN — PHENOBARBITAL SODIUM 65 MG: 65 INJECTION INTRAMUSCULAR at 04:26

## 2024-05-14 RX ADMIN — BUSPIRONE HYDROCHLORIDE 5 MG: 5 TABLET ORAL at 22:21

## 2024-05-14 RX ADMIN — RIFAXIMIN 550 MG: 550 TABLET ORAL at 22:25

## 2024-05-14 RX ADMIN — METOPROLOL SUCCINATE 25 MG: 25 TABLET, FILM COATED, EXTENDED RELEASE ORAL at 09:59

## 2024-05-14 RX ADMIN — MIRTAZAPINE 7.5 MG: 15 TABLET, FILM COATED ORAL at 22:20

## 2024-05-14 RX ADMIN — SODIUM CHLORIDE, PRESERVATIVE FREE 10 ML: 5 INJECTION INTRAVENOUS at 09:59

## 2024-05-14 RX ADMIN — LORAZEPAM 3 MG: 2 INJECTION INTRAMUSCULAR; INTRAVENOUS at 15:02

## 2024-05-14 RX ADMIN — LORAZEPAM 1 MG: 1 TABLET ORAL at 23:21

## 2024-05-14 RX ADMIN — WARFARIN SODIUM 5 MG: 5 TABLET ORAL at 17:04

## 2024-05-14 RX ADMIN — SODIUM CHLORIDE, PRESERVATIVE FREE 10 ML: 5 INJECTION INTRAVENOUS at 22:20

## 2024-05-14 RX ADMIN — LACTULOSE 20 G: 10 SOLUTION ORAL at 10:00

## 2024-05-14 RX ADMIN — Medication 100 MG: at 09:59

## 2024-05-14 RX ADMIN — HYDRALAZINE HYDROCHLORIDE 10 MG: 10 TABLET, FILM COATED ORAL at 22:21

## 2024-05-14 ASSESSMENT — PAIN SCALES - GENERAL: PAINLEVEL_OUTOF10: 0

## 2024-05-14 ASSESSMENT — PAIN SCALES - WONG BAKER: WONGBAKER_NUMERICALRESPONSE: NO HURT

## 2024-05-14 NOTE — PLAN OF CARE
Problem: Pain  Goal: Verbalizes/displays adequate comfort level or baseline comfort level  Outcome: Progressing     Problem: Skin/Tissue Integrity  Goal: Absence of new skin breakdown  Description: 1.  Monitor for areas of redness and/or skin breakdown  2.  Assess vascular access sites hourly  3.  Every 4-6 hours minimum:  Change oxygen saturation probe site  4.  Every 4-6 hours:  If on nasal continuous positive airway pressure, respiratory therapy assess nares and determine need for appliance change or resting period.  Outcome: Progressing     Problem: Safety - Adult  Goal: Free from fall injury  Outcome: Progressing     Problem: Discharge Planning  Goal: Discharge to home or other facility with appropriate resources  Outcome: Progressing     Problem: Chronic Conditions and Co-morbidities  Goal: Patient's chronic conditions and co-morbidity symptoms are monitored and maintained or improved  Outcome: Progressing     Problem: Nutrition Deficit:  Goal: Optimize nutritional status  Outcome: Progressing

## 2024-05-15 LAB
ALBUMIN SERPL-MCNC: 3.7 G/DL (ref 3.5–5.2)
ALP SERPL-CCNC: 149 U/L (ref 40–129)
ALT SERPL-CCNC: 7 U/L (ref 0–40)
AMMONIA PLAS-SCNC: 44 UMOL/L (ref 16–60)
ANION GAP SERPL CALCULATED.3IONS-SCNC: 9 MMOL/L (ref 7–16)
AST SERPL-CCNC: 17 U/L (ref 0–39)
BASOPHILS # BLD: 0.03 K/UL (ref 0–0.2)
BASOPHILS NFR BLD: 1 % (ref 0–2)
BILIRUB SERPL-MCNC: 1 MG/DL (ref 0–1.2)
BUN SERPL-MCNC: 17 MG/DL (ref 6–20)
CALCIUM SERPL-MCNC: 9.3 MG/DL (ref 8.6–10.2)
CHLORIDE SERPL-SCNC: 107 MMOL/L (ref 98–107)
CO2 SERPL-SCNC: 24 MMOL/L (ref 22–29)
CREAT SERPL-MCNC: 1.7 MG/DL (ref 0.7–1.2)
EOSINOPHIL # BLD: 0.16 K/UL (ref 0.05–0.5)
EOSINOPHILS RELATIVE PERCENT: 3 % (ref 0–6)
ERYTHROCYTE [DISTWIDTH] IN BLOOD BY AUTOMATED COUNT: 18.6 % (ref 11.5–15)
GFR, ESTIMATED: 46 ML/MIN/1.73M2
GLUCOSE SERPL-MCNC: 101 MG/DL (ref 74–99)
HCT VFR BLD AUTO: 30.8 % (ref 37–54)
HGB BLD-MCNC: 9 G/DL (ref 12.5–16.5)
IMM GRANULOCYTES # BLD AUTO: <0.03 K/UL (ref 0–0.58)
IMM GRANULOCYTES NFR BLD: 0 % (ref 0–5)
INR PPP: 2
LYMPHOCYTES NFR BLD: 0.71 K/UL (ref 1.5–4)
LYMPHOCYTES RELATIVE PERCENT: 13 % (ref 20–42)
MAGNESIUM SERPL-MCNC: 1.8 MG/DL (ref 1.6–2.6)
MCH RBC QN AUTO: 25.8 PG (ref 26–35)
MCHC RBC AUTO-ENTMCNC: 29.2 G/DL (ref 32–34.5)
MCV RBC AUTO: 88.3 FL (ref 80–99.9)
MONOCYTES NFR BLD: 0.77 K/UL (ref 0.1–0.95)
MONOCYTES NFR BLD: 14 % (ref 2–12)
NEUTROPHILS NFR BLD: 68 % (ref 43–80)
NEUTS SEG NFR BLD: 3.64 K/UL (ref 1.8–7.3)
PARTIAL THROMBOPLASTIN TIME: 55.3 SEC (ref 24.5–35.1)
PHOSPHATE SERPL-MCNC: 4.2 MG/DL (ref 2.5–4.5)
PLATELET CONFIRMATION: NORMAL
PLATELET, FLUORESCENCE: 95 K/UL (ref 130–450)
PMV BLD AUTO: 13.5 FL (ref 7–12)
POTASSIUM SERPL-SCNC: 4.4 MMOL/L (ref 3.5–5)
PROT SERPL-MCNC: 6.9 G/DL (ref 6.4–8.3)
PROTHROMBIN TIME: 21.5 SEC (ref 9.3–12.4)
RBC # BLD AUTO: 3.49 M/UL (ref 3.8–5.8)
SODIUM SERPL-SCNC: 140 MMOL/L (ref 132–146)
WBC OTHER # BLD: 5.3 K/UL (ref 4.5–11.5)

## 2024-05-15 PROCEDURE — 99233 SBSQ HOSP IP/OBS HIGH 50: CPT | Performed by: INTERNAL MEDICINE

## 2024-05-15 PROCEDURE — 80053 COMPREHEN METABOLIC PANEL: CPT

## 2024-05-15 PROCEDURE — 6360000002 HC RX W HCPCS: Performed by: STUDENT IN AN ORGANIZED HEALTH CARE EDUCATION/TRAINING PROGRAM

## 2024-05-15 PROCEDURE — 2580000003 HC RX 258

## 2024-05-15 PROCEDURE — 92610 EVALUATE SWALLOWING FUNCTION: CPT

## 2024-05-15 PROCEDURE — 36415 COLL VENOUS BLD VENIPUNCTURE: CPT

## 2024-05-15 PROCEDURE — 2580000003 HC RX 258: Performed by: INTERNAL MEDICINE

## 2024-05-15 PROCEDURE — 85730 THROMBOPLASTIN TIME PARTIAL: CPT

## 2024-05-15 PROCEDURE — 85025 COMPLETE CBC W/AUTO DIFF WBC: CPT

## 2024-05-15 PROCEDURE — C9113 INJ PANTOPRAZOLE SODIUM, VIA: HCPCS

## 2024-05-15 PROCEDURE — 6370000000 HC RX 637 (ALT 250 FOR IP): Performed by: NURSE PRACTITIONER

## 2024-05-15 PROCEDURE — A4216 STERILE WATER/SALINE, 10 ML: HCPCS

## 2024-05-15 PROCEDURE — 6360000002 HC RX W HCPCS: Performed by: INTERNAL MEDICINE

## 2024-05-15 PROCEDURE — 97530 THERAPEUTIC ACTIVITIES: CPT

## 2024-05-15 PROCEDURE — 6360000002 HC RX W HCPCS

## 2024-05-15 PROCEDURE — 84100 ASSAY OF PHOSPHORUS: CPT

## 2024-05-15 PROCEDURE — 83735 ASSAY OF MAGNESIUM: CPT

## 2024-05-15 PROCEDURE — 85610 PROTHROMBIN TIME: CPT

## 2024-05-15 PROCEDURE — 2060000000 HC ICU INTERMEDIATE R&B

## 2024-05-15 PROCEDURE — 82140 ASSAY OF AMMONIA: CPT

## 2024-05-15 PROCEDURE — 99232 SBSQ HOSP IP/OBS MODERATE 35: CPT | Performed by: INTERNAL MEDICINE

## 2024-05-15 RX ORDER — MIDODRINE HYDROCHLORIDE 5 MG/1
2.5 TABLET ORAL
Status: DISCONTINUED | OUTPATIENT
Start: 2024-05-16 | End: 2024-05-19

## 2024-05-15 RX ORDER — WARFARIN SODIUM 5 MG/1
5 TABLET ORAL
Status: ACTIVE | OUTPATIENT
Start: 2024-05-15 | End: 2024-05-16

## 2024-05-15 RX ORDER — ISOSORBIDE DINITRATE 10 MG/1
10 TABLET ORAL 3 TIMES DAILY
Status: DISCONTINUED | OUTPATIENT
Start: 2024-05-16 | End: 2024-05-19 | Stop reason: HOSPADM

## 2024-05-15 RX ORDER — LORAZEPAM 2 MG/ML
1 INJECTION INTRAMUSCULAR EVERY 4 HOURS PRN
Status: DISCONTINUED | OUTPATIENT
Start: 2024-05-15 | End: 2024-05-17

## 2024-05-15 RX ADMIN — LORAZEPAM 1 MG: 2 INJECTION INTRAMUSCULAR; INTRAVENOUS at 06:23

## 2024-05-15 RX ADMIN — HEPARIN SODIUM 10 UNITS/KG/HR: 10000 INJECTION, SOLUTION INTRAVENOUS at 04:05

## 2024-05-15 RX ADMIN — SODIUM CHLORIDE, PRESERVATIVE FREE 10 ML: 5 INJECTION INTRAVENOUS at 08:26

## 2024-05-15 RX ADMIN — SODIUM CHLORIDE, PRESERVATIVE FREE 40 MG: 5 INJECTION INTRAVENOUS at 05:39

## 2024-05-15 RX ADMIN — PHENOBARBITAL SODIUM 65 MG: 65 INJECTION INTRAMUSCULAR at 05:39

## 2024-05-15 RX ADMIN — LORAZEPAM 3 MG: 2 INJECTION INTRAMUSCULAR; INTRAVENOUS at 08:36

## 2024-05-15 RX ADMIN — LORAZEPAM 1 MG: 2 INJECTION INTRAMUSCULAR; INTRAVENOUS at 15:55

## 2024-05-15 RX ADMIN — SODIUM CHLORIDE, PRESERVATIVE FREE 40 MG: 5 INJECTION INTRAVENOUS at 15:55

## 2024-05-16 LAB
ALBUMIN SERPL-MCNC: 3.9 G/DL (ref 3.5–5.2)
ALP SERPL-CCNC: 168 U/L (ref 40–129)
ALT SERPL-CCNC: 8 U/L (ref 0–40)
ANION GAP SERPL CALCULATED.3IONS-SCNC: 13 MMOL/L (ref 7–16)
AST SERPL-CCNC: 26 U/L (ref 0–39)
BASOPHILS # BLD: 0.05 K/UL (ref 0–0.2)
BASOPHILS NFR BLD: 1 % (ref 0–2)
BILIRUB SERPL-MCNC: 1.5 MG/DL (ref 0–1.2)
BUN SERPL-MCNC: 16 MG/DL (ref 6–20)
CALCIUM SERPL-MCNC: 9.6 MG/DL (ref 8.6–10.2)
CHLORIDE SERPL-SCNC: 106 MMOL/L (ref 98–107)
CO2 SERPL-SCNC: 22 MMOL/L (ref 22–29)
CREAT SERPL-MCNC: 1.6 MG/DL (ref 0.7–1.2)
EOSINOPHIL # BLD: 0.1 K/UL (ref 0.05–0.5)
EOSINOPHILS RELATIVE PERCENT: 2 % (ref 0–6)
ERYTHROCYTE [DISTWIDTH] IN BLOOD BY AUTOMATED COUNT: 18.1 % (ref 11.5–15)
GFR, ESTIMATED: 49 ML/MIN/1.73M2
GLUCOSE SERPL-MCNC: 103 MG/DL (ref 74–99)
HCT VFR BLD AUTO: 35.2 % (ref 37–54)
HGB BLD-MCNC: 10.3 G/DL (ref 12.5–16.5)
IMM GRANULOCYTES # BLD AUTO: <0.03 K/UL (ref 0–0.58)
IMM GRANULOCYTES NFR BLD: 0 % (ref 0–5)
INR PPP: 2.1
INR PPP: 2.2
LYMPHOCYTES NFR BLD: 0.77 K/UL (ref 1.5–4)
LYMPHOCYTES RELATIVE PERCENT: 12 % (ref 20–42)
MAGNESIUM SERPL-MCNC: 1.7 MG/DL (ref 1.6–2.6)
MCH RBC QN AUTO: 25.8 PG (ref 26–35)
MCHC RBC AUTO-ENTMCNC: 29.3 G/DL (ref 32–34.5)
MCV RBC AUTO: 88 FL (ref 80–99.9)
MONOCYTES NFR BLD: 0.96 K/UL (ref 0.1–0.95)
MONOCYTES NFR BLD: 15 % (ref 2–12)
NEUTROPHILS NFR BLD: 70 % (ref 43–80)
NEUTS SEG NFR BLD: 4.46 K/UL (ref 1.8–7.3)
PARTIAL THROMBOPLASTIN TIME: 33.6 SEC (ref 24.5–35.1)
PARTIAL THROMBOPLASTIN TIME: 43.1 SEC (ref 24.5–35.1)
PHOSPHATE SERPL-MCNC: 2.5 MG/DL (ref 2.5–4.5)
PLATELET # BLD AUTO: 127 K/UL (ref 130–450)
PMV BLD AUTO: 11.9 FL (ref 7–12)
POTASSIUM SERPL-SCNC: 4.3 MMOL/L (ref 3.5–5)
PROT SERPL-MCNC: 7.7 G/DL (ref 6.4–8.3)
PROTHROMBIN TIME: 23.4 SEC (ref 9.3–12.4)
PROTHROMBIN TIME: 24.4 SEC (ref 9.3–12.4)
RBC # BLD AUTO: 4 M/UL (ref 3.8–5.8)
SODIUM SERPL-SCNC: 141 MMOL/L (ref 132–146)
WBC OTHER # BLD: 6.4 K/UL (ref 4.5–11.5)

## 2024-05-16 PROCEDURE — 84100 ASSAY OF PHOSPHORUS: CPT

## 2024-05-16 PROCEDURE — 6370000000 HC RX 637 (ALT 250 FOR IP): Performed by: STUDENT IN AN ORGANIZED HEALTH CARE EDUCATION/TRAINING PROGRAM

## 2024-05-16 PROCEDURE — 92526 ORAL FUNCTION THERAPY: CPT

## 2024-05-16 PROCEDURE — C9113 INJ PANTOPRAZOLE SODIUM, VIA: HCPCS

## 2024-05-16 PROCEDURE — 6360000002 HC RX W HCPCS

## 2024-05-16 PROCEDURE — 85025 COMPLETE CBC W/AUTO DIFF WBC: CPT

## 2024-05-16 PROCEDURE — 6370000000 HC RX 637 (ALT 250 FOR IP): Performed by: HOSPITALIST

## 2024-05-16 PROCEDURE — 6370000000 HC RX 637 (ALT 250 FOR IP): Performed by: INTERNAL MEDICINE

## 2024-05-16 PROCEDURE — 2060000000 HC ICU INTERMEDIATE R&B

## 2024-05-16 PROCEDURE — 2580000003 HC RX 258: Performed by: INTERNAL MEDICINE

## 2024-05-16 PROCEDURE — 85730 THROMBOPLASTIN TIME PARTIAL: CPT

## 2024-05-16 PROCEDURE — 83735 ASSAY OF MAGNESIUM: CPT

## 2024-05-16 PROCEDURE — 36415 COLL VENOUS BLD VENIPUNCTURE: CPT

## 2024-05-16 PROCEDURE — 6370000000 HC RX 637 (ALT 250 FOR IP): Performed by: NURSE PRACTITIONER

## 2024-05-16 PROCEDURE — 99232 SBSQ HOSP IP/OBS MODERATE 35: CPT | Performed by: INTERNAL MEDICINE

## 2024-05-16 PROCEDURE — 2580000003 HC RX 258

## 2024-05-16 PROCEDURE — 6360000002 HC RX W HCPCS: Performed by: INTERNAL MEDICINE

## 2024-05-16 PROCEDURE — 6370000000 HC RX 637 (ALT 250 FOR IP): Performed by: CLINICAL NURSE SPECIALIST

## 2024-05-16 PROCEDURE — 85610 PROTHROMBIN TIME: CPT

## 2024-05-16 PROCEDURE — 6360000002 HC RX W HCPCS: Performed by: STUDENT IN AN ORGANIZED HEALTH CARE EDUCATION/TRAINING PROGRAM

## 2024-05-16 PROCEDURE — A4216 STERILE WATER/SALINE, 10 ML: HCPCS

## 2024-05-16 PROCEDURE — 80053 COMPREHEN METABOLIC PANEL: CPT

## 2024-05-16 PROCEDURE — 6370000000 HC RX 637 (ALT 250 FOR IP)

## 2024-05-16 RX ORDER — WARFARIN SODIUM 3 MG/1
6 TABLET ORAL
Status: COMPLETED | OUTPATIENT
Start: 2024-05-16 | End: 2024-05-16

## 2024-05-16 RX ADMIN — WARFARIN SODIUM 6 MG: 3 TABLET ORAL at 17:43

## 2024-05-16 RX ADMIN — HEPARIN SODIUM 4000 UNITS: 1000 INJECTION INTRAVENOUS; SUBCUTANEOUS at 14:33

## 2024-05-16 RX ADMIN — SODIUM CHLORIDE, PRESERVATIVE FREE 10 ML: 5 INJECTION INTRAVENOUS at 09:20

## 2024-05-16 RX ADMIN — RIFAXIMIN 550 MG: 550 TABLET ORAL at 09:20

## 2024-05-16 RX ADMIN — ISOSORBIDE DINITRATE 10 MG: 10 TABLET ORAL at 09:19

## 2024-05-16 RX ADMIN — HEPARIN SODIUM 10 UNITS/KG/HR: 10000 INJECTION, SOLUTION INTRAVENOUS at 04:53

## 2024-05-16 RX ADMIN — SODIUM CHLORIDE, PRESERVATIVE FREE 40 MG: 5 INJECTION INTRAVENOUS at 16:04

## 2024-05-16 RX ADMIN — HYDRALAZINE HYDROCHLORIDE 10 MG: 10 TABLET, FILM COATED ORAL at 20:24

## 2024-05-16 RX ADMIN — BUSPIRONE HYDROCHLORIDE 5 MG: 5 TABLET ORAL at 14:41

## 2024-05-16 RX ADMIN — Medication 100 MG: at 09:19

## 2024-05-16 RX ADMIN — Medication 3 MG: at 17:43

## 2024-05-16 RX ADMIN — SODIUM CHLORIDE, PRESERVATIVE FREE 40 MG: 5 INJECTION INTRAVENOUS at 05:33

## 2024-05-16 RX ADMIN — METOPROLOL SUCCINATE 25 MG: 25 TABLET, FILM COATED, EXTENDED RELEASE ORAL at 09:19

## 2024-05-16 RX ADMIN — HYDRALAZINE HYDROCHLORIDE 10 MG: 10 TABLET, FILM COATED ORAL at 14:41

## 2024-05-16 RX ADMIN — ATORVASTATIN CALCIUM 20 MG: 20 TABLET, FILM COATED ORAL at 20:24

## 2024-05-16 RX ADMIN — RIFAXIMIN 550 MG: 550 TABLET ORAL at 20:24

## 2024-05-16 RX ADMIN — EMPAGLIFLOZIN 10 MG: 10 TABLET, FILM COATED ORAL at 09:20

## 2024-05-16 RX ADMIN — LORAZEPAM 1 MG: 2 INJECTION INTRAMUSCULAR; INTRAVENOUS at 16:04

## 2024-05-16 RX ADMIN — BUSPIRONE HYDROCHLORIDE 5 MG: 5 TABLET ORAL at 09:19

## 2024-05-16 RX ADMIN — LORAZEPAM 1 MG: 2 INJECTION INTRAMUSCULAR; INTRAVENOUS at 04:17

## 2024-05-16 RX ADMIN — LACTULOSE 20 G: 10 SOLUTION ORAL at 09:20

## 2024-05-16 RX ADMIN — METOPROLOL SUCCINATE 25 MG: 25 TABLET, FILM COATED, EXTENDED RELEASE ORAL at 20:24

## 2024-05-16 RX ADMIN — BUSPIRONE HYDROCHLORIDE 5 MG: 5 TABLET ORAL at 20:24

## 2024-05-16 RX ADMIN — MIRTAZAPINE 7.5 MG: 15 TABLET, FILM COATED ORAL at 20:24

## 2024-05-16 RX ADMIN — ISOSORBIDE DINITRATE 10 MG: 10 TABLET ORAL at 14:41

## 2024-05-16 NOTE — CARE COORDINATION
CASE MANAGEMENT.... PT 11/OT ordered to see. Speech saw and dysphagia diet in place. Met with patient at the bedside. He is alert and resting quietly in bed. Attempted to discuss dc plans, but he is confused.  in room. Elena from [facility] continues to follow. Not appropriate to see patient today d/t his ams. Patient would benefit from inpatient etoh and physical rehab. Two options 1) [facility], 2) [facility]. Attempted to call Leonie lynn to discuss dc needs. Called [phone]. No answer and unable to leave vm. Tolerating room air. Continues on iv heparin gtt and coumadin -pharmacy dosing. INR 2.1. Cardio recommends INR 2.5-3.5. Remains on Jardiance-on pta. Will cont to follow along and assist with needs accordingly.

## 2024-05-16 NOTE — CARE COORDINATION
Peer Recovery Support Note    Name: Len Dorsey  Date: 5/16/2024    Chief Complaint   Patient presents with    Chest Pain     Started last night, hasn't taken coumadin in 3 days..     Alcohol Problem     Last drink last night, states drinks 4  24oz blue juice each day. No food for 3 days. 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        Peer Support met with patient.  [] Support and education provided  [] Resources provided   [] Treatment referral:   [x] Other:   [] Patient declined peer recovery services     Referred By: Tanvi (WARREN/MICHELE)    Notes: Peer following patient to assist with coordinating PATRICA treatment/placement if patient is agreeable. Spoke to patient when he was initially admitted on 5/7, and he voiced that he would like help abstaining from alcohol. Peer continues to follow patient as he progresses medically, and will attempt to meet on 5/19 if appropriate.    *If it turns out that patient requires higher level of care physically, Peer will recommend [facility]RICA program  (Liaison: Erin Snowden: [phone])  [facility] would be second option.    If any needs arise in meantime, please contact [facility] office at [phone]    Signed: Elena Branch, 5/16/2024

## 2024-05-17 LAB
ALBUMIN SERPL-MCNC: 3.9 G/DL (ref 3.5–5.2)
ALP SERPL-CCNC: 163 U/L (ref 40–129)
ALT SERPL-CCNC: 9 U/L (ref 0–40)
ANION GAP SERPL CALCULATED.3IONS-SCNC: 13 MMOL/L (ref 7–16)
AST SERPL-CCNC: 24 U/L (ref 0–39)
BILIRUB SERPL-MCNC: 1.3 MG/DL (ref 0–1.2)
BUN SERPL-MCNC: 16 MG/DL (ref 6–20)
CALCIUM SERPL-MCNC: 9.6 MG/DL (ref 8.6–10.2)
CHLORIDE SERPL-SCNC: 107 MMOL/L (ref 98–107)
CO2 SERPL-SCNC: 21 MMOL/L (ref 22–29)
CREAT SERPL-MCNC: 1.6 MG/DL (ref 0.7–1.2)
GFR, ESTIMATED: 49 ML/MIN/1.73M2
GLUCOSE SERPL-MCNC: 80 MG/DL (ref 74–99)
INR PPP: 2.2
INR PPP: 2.8
MAGNESIUM SERPL-MCNC: 1.8 MG/DL (ref 1.6–2.6)
PARTIAL THROMBOPLASTIN TIME: 37.9 SEC (ref 24.5–35.1)
PARTIAL THROMBOPLASTIN TIME: 42.9 SEC (ref 24.5–35.1)
PARTIAL THROMBOPLASTIN TIME: >240 SEC (ref 24.5–35.1)
POTASSIUM SERPL-SCNC: 4.4 MMOL/L (ref 3.5–5)
PROT SERPL-MCNC: 7.8 G/DL (ref 6.4–8.3)
PROTHROMBIN TIME: 24.6 SEC (ref 9.3–12.4)
PROTHROMBIN TIME: 31.1 SEC (ref 9.3–12.4)
SODIUM SERPL-SCNC: 141 MMOL/L (ref 132–146)

## 2024-05-17 PROCEDURE — 6360000002 HC RX W HCPCS

## 2024-05-17 PROCEDURE — 97530 THERAPEUTIC ACTIVITIES: CPT

## 2024-05-17 PROCEDURE — 6370000000 HC RX 637 (ALT 250 FOR IP)

## 2024-05-17 PROCEDURE — 6360000002 HC RX W HCPCS: Performed by: STUDENT IN AN ORGANIZED HEALTH CARE EDUCATION/TRAINING PROGRAM

## 2024-05-17 PROCEDURE — C9113 INJ PANTOPRAZOLE SODIUM, VIA: HCPCS

## 2024-05-17 PROCEDURE — A4216 STERILE WATER/SALINE, 10 ML: HCPCS

## 2024-05-17 PROCEDURE — 6370000000 HC RX 637 (ALT 250 FOR IP): Performed by: NURSE PRACTITIONER

## 2024-05-17 PROCEDURE — 2060000000 HC ICU INTERMEDIATE R&B

## 2024-05-17 PROCEDURE — 83735 ASSAY OF MAGNESIUM: CPT

## 2024-05-17 PROCEDURE — 2580000003 HC RX 258

## 2024-05-17 PROCEDURE — 36415 COLL VENOUS BLD VENIPUNCTURE: CPT

## 2024-05-17 PROCEDURE — 6370000000 HC RX 637 (ALT 250 FOR IP): Performed by: CLINICAL NURSE SPECIALIST

## 2024-05-17 PROCEDURE — 6370000000 HC RX 637 (ALT 250 FOR IP): Performed by: INTERNAL MEDICINE

## 2024-05-17 PROCEDURE — 6370000000 HC RX 637 (ALT 250 FOR IP): Performed by: STUDENT IN AN ORGANIZED HEALTH CARE EDUCATION/TRAINING PROGRAM

## 2024-05-17 PROCEDURE — 6370000000 HC RX 637 (ALT 250 FOR IP): Performed by: HOSPITALIST

## 2024-05-17 PROCEDURE — 97165 OT EVAL LOW COMPLEX 30 MIN: CPT

## 2024-05-17 PROCEDURE — 85730 THROMBOPLASTIN TIME PARTIAL: CPT

## 2024-05-17 PROCEDURE — 80053 COMPREHEN METABOLIC PANEL: CPT

## 2024-05-17 PROCEDURE — 99233 SBSQ HOSP IP/OBS HIGH 50: CPT | Performed by: INTERNAL MEDICINE

## 2024-05-17 PROCEDURE — 85610 PROTHROMBIN TIME: CPT

## 2024-05-17 PROCEDURE — 92526 ORAL FUNCTION THERAPY: CPT

## 2024-05-17 PROCEDURE — 2580000003 HC RX 258: Performed by: INTERNAL MEDICINE

## 2024-05-17 RX ORDER — LORAZEPAM 1 MG/1
1 TABLET ORAL EVERY 8 HOURS PRN
Status: DISCONTINUED | OUTPATIENT
Start: 2024-05-17 | End: 2024-05-19 | Stop reason: HOSPADM

## 2024-05-17 RX ORDER — WARFARIN SODIUM 3 MG/1
6 TABLET ORAL
Status: COMPLETED | OUTPATIENT
Start: 2024-05-17 | End: 2024-05-17

## 2024-05-17 RX ADMIN — SODIUM CHLORIDE, PRESERVATIVE FREE 10 ML: 5 INJECTION INTRAVENOUS at 20:42

## 2024-05-17 RX ADMIN — SODIUM CHLORIDE, PRESERVATIVE FREE 40 MG: 5 INJECTION INTRAVENOUS at 20:39

## 2024-05-17 RX ADMIN — BUSPIRONE HYDROCHLORIDE 5 MG: 5 TABLET ORAL at 13:24

## 2024-05-17 RX ADMIN — HEPARIN SODIUM 16 UNITS/KG/HR: 10000 INJECTION, SOLUTION INTRAVENOUS at 00:15

## 2024-05-17 RX ADMIN — METOPROLOL SUCCINATE 25 MG: 25 TABLET, FILM COATED, EXTENDED RELEASE ORAL at 07:58

## 2024-05-17 RX ADMIN — Medication 3 MG: at 20:39

## 2024-05-17 RX ADMIN — HYDRALAZINE HYDROCHLORIDE 10 MG: 10 TABLET, FILM COATED ORAL at 05:52

## 2024-05-17 RX ADMIN — RIFAXIMIN 550 MG: 550 TABLET ORAL at 20:39

## 2024-05-17 RX ADMIN — HYDRALAZINE HYDROCHLORIDE 10 MG: 10 TABLET, FILM COATED ORAL at 20:39

## 2024-05-17 RX ADMIN — Medication 100 MG: at 07:58

## 2024-05-17 RX ADMIN — HEPARIN SODIUM 2000 UNITS: 1000 INJECTION INTRAVENOUS; SUBCUTANEOUS at 10:38

## 2024-05-17 RX ADMIN — ISOSORBIDE DINITRATE 10 MG: 10 TABLET ORAL at 07:58

## 2024-05-17 RX ADMIN — RIFAXIMIN 550 MG: 550 TABLET ORAL at 07:58

## 2024-05-17 RX ADMIN — MIDODRINE HYDROCHLORIDE 2.5 MG: 5 TABLET ORAL at 11:17

## 2024-05-17 RX ADMIN — MIRTAZAPINE 7.5 MG: 15 TABLET, FILM COATED ORAL at 20:39

## 2024-05-17 RX ADMIN — HEPARIN SODIUM 18 UNITS/KG/HR: 10000 INJECTION, SOLUTION INTRAVENOUS at 10:37

## 2024-05-17 RX ADMIN — METOPROLOL SUCCINATE 25 MG: 25 TABLET, FILM COATED, EXTENDED RELEASE ORAL at 20:39

## 2024-05-17 RX ADMIN — HYDRALAZINE HYDROCHLORIDE 10 MG: 10 TABLET, FILM COATED ORAL at 13:24

## 2024-05-17 RX ADMIN — ISOSORBIDE DINITRATE 10 MG: 10 TABLET ORAL at 13:24

## 2024-05-17 RX ADMIN — LACTULOSE 20 G: 10 SOLUTION ORAL at 08:00

## 2024-05-17 RX ADMIN — BUSPIRONE HYDROCHLORIDE 5 MG: 5 TABLET ORAL at 07:58

## 2024-05-17 RX ADMIN — ISOSORBIDE DINITRATE 10 MG: 10 TABLET ORAL at 20:38

## 2024-05-17 RX ADMIN — ATORVASTATIN CALCIUM 20 MG: 20 TABLET, FILM COATED ORAL at 20:39

## 2024-05-17 RX ADMIN — WARFARIN SODIUM 6 MG: 3 TABLET ORAL at 17:04

## 2024-05-17 RX ADMIN — HEPARIN SODIUM 2000 UNITS: 1000 INJECTION INTRAVENOUS; SUBCUTANEOUS at 00:15

## 2024-05-17 RX ADMIN — MIDODRINE HYDROCHLORIDE 2.5 MG: 5 TABLET ORAL at 07:57

## 2024-05-17 RX ADMIN — EMPAGLIFLOZIN 10 MG: 10 TABLET, FILM COATED ORAL at 07:58

## 2024-05-17 RX ADMIN — BUSPIRONE HYDROCHLORIDE 5 MG: 5 TABLET ORAL at 20:38

## 2024-05-17 RX ADMIN — SODIUM CHLORIDE, PRESERVATIVE FREE 40 MG: 5 INJECTION INTRAVENOUS at 05:52

## 2024-05-17 RX ADMIN — MIDODRINE HYDROCHLORIDE 2.5 MG: 5 TABLET ORAL at 17:05

## 2024-05-17 ASSESSMENT — PAIN SCALES - GENERAL
PAINLEVEL_OUTOF10: 0
PAINLEVEL_OUTOF10: 0

## 2024-05-17 ASSESSMENT — PAIN SCALES - WONG BAKER
WONGBAKER_NUMERICALRESPONSE: NO HURT

## 2024-05-17 NOTE — CARE COORDINATION
CASE MANAGEMENT.... Patients Leonie lynn, called back. She is interested in ETOH/Physical rehab for patient. We discussed choices. He has history at [facility], but she is not interested and is requesting [facility]. Referral called to Erin with the facility. She will review patient info and call back with determination. Met with patient at the bedside. He is more alert/oriented than days prior. Discussed the above and he is adamantly not interested in [facility]. Instructed him to work with pt/ot and speech so his discharge needs can be reevaluated. Therapy/Speech aware and will see.     UPDATE... Erin from [facility] can accept patient on coumadin, but not xifaxin because it puts him over the cost that Medicare covers per day. Also, he has 20 days w/o a copay. After that its $204/day.      UPDATE....PT 17/15. Patient ambulated with ww and minimal assist. Speech saw and confirmed Camilo can tolerate soft and bite size with thin liquids. Met with him to readdress plans. He is not interested in [facility]. Agrees he would benefit having a walker. Order obtained. Discussed DME choices. He has no preference. Referral called to Osmany with [facility] and he is aware of poss dc over the weekend. Camilo was receptive to outpt ETOH rehab. List of community resources provided. Encouraged him to call asap. In the meantime, he continues on iv heparin gtt with coumadin bridge. INR 2.2 this. Needs to be 2.5-3.5 per cardio.

## 2024-05-17 NOTE — PLAN OF CARE
Problem: Pain  Goal: Verbalizes/displays adequate comfort level or baseline comfort level  Outcome: Progressing  Flowsheets (Taken 5/17/2024 3631)  Verbalizes/displays adequate comfort level or baseline comfort level:   Assess pain using appropriate pain scale   Encourage patient to monitor pain and request assistance

## 2024-05-18 LAB
ALBUMIN SERPL-MCNC: 3.6 G/DL (ref 3.5–5.2)
ALP SERPL-CCNC: 128 U/L (ref 40–129)
ALT SERPL-CCNC: 8 U/L (ref 0–40)
ANION GAP SERPL CALCULATED.3IONS-SCNC: 13 MMOL/L (ref 7–16)
ANION GAP SERPL CALCULATED.3IONS-SCNC: 13 MMOL/L (ref 7–16)
AST SERPL-CCNC: 16 U/L (ref 0–39)
BILIRUB SERPL-MCNC: 0.7 MG/DL (ref 0–1.2)
BNP SERPL-MCNC: 909 PG/ML (ref 0–125)
BUN SERPL-MCNC: 21 MG/DL (ref 6–20)
BUN SERPL-MCNC: 21 MG/DL (ref 6–20)
CALCIUM SERPL-MCNC: 9 MG/DL (ref 8.6–10.2)
CALCIUM SERPL-MCNC: 9.1 MG/DL (ref 8.6–10.2)
CHLORIDE SERPL-SCNC: 105 MMOL/L (ref 98–107)
CHLORIDE SERPL-SCNC: 105 MMOL/L (ref 98–107)
CO2 SERPL-SCNC: 21 MMOL/L (ref 22–29)
CO2 SERPL-SCNC: 22 MMOL/L (ref 22–29)
CREAT SERPL-MCNC: 2 MG/DL (ref 0.7–1.2)
CREAT SERPL-MCNC: 2.2 MG/DL (ref 0.7–1.2)
GFR, ESTIMATED: 35 ML/MIN/1.73M2
GFR, ESTIMATED: 39 ML/MIN/1.73M2
GLUCOSE SERPL-MCNC: 100 MG/DL (ref 74–99)
GLUCOSE SERPL-MCNC: 99 MG/DL (ref 74–99)
INR PPP: 3.6
MAGNESIUM SERPL-MCNC: 1.8 MG/DL (ref 1.6–2.6)
POTASSIUM SERPL-SCNC: 3.9 MMOL/L (ref 3.5–5)
POTASSIUM SERPL-SCNC: 3.9 MMOL/L (ref 3.5–5)
PROT SERPL-MCNC: 6.6 G/DL (ref 6.4–8.3)
PROTHROMBIN TIME: 38.9 SEC (ref 9.3–12.4)
SODIUM SERPL-SCNC: 139 MMOL/L (ref 132–146)
SODIUM SERPL-SCNC: 140 MMOL/L (ref 132–146)

## 2024-05-18 PROCEDURE — 80048 BASIC METABOLIC PNL TOTAL CA: CPT

## 2024-05-18 PROCEDURE — 2580000003 HC RX 258: Performed by: INTERNAL MEDICINE

## 2024-05-18 PROCEDURE — 51798 US URINE CAPACITY MEASURE: CPT

## 2024-05-18 PROCEDURE — C9113 INJ PANTOPRAZOLE SODIUM, VIA: HCPCS

## 2024-05-18 PROCEDURE — 36415 COLL VENOUS BLD VENIPUNCTURE: CPT

## 2024-05-18 PROCEDURE — 2060000000 HC ICU INTERMEDIATE R&B

## 2024-05-18 PROCEDURE — 99232 SBSQ HOSP IP/OBS MODERATE 35: CPT | Performed by: INTERNAL MEDICINE

## 2024-05-18 PROCEDURE — 6370000000 HC RX 637 (ALT 250 FOR IP): Performed by: INTERNAL MEDICINE

## 2024-05-18 PROCEDURE — 6370000000 HC RX 637 (ALT 250 FOR IP)

## 2024-05-18 PROCEDURE — 83735 ASSAY OF MAGNESIUM: CPT

## 2024-05-18 PROCEDURE — 85610 PROTHROMBIN TIME: CPT

## 2024-05-18 PROCEDURE — 6370000000 HC RX 637 (ALT 250 FOR IP): Performed by: STUDENT IN AN ORGANIZED HEALTH CARE EDUCATION/TRAINING PROGRAM

## 2024-05-18 PROCEDURE — 2580000003 HC RX 258

## 2024-05-18 PROCEDURE — 6360000002 HC RX W HCPCS

## 2024-05-18 PROCEDURE — 83880 ASSAY OF NATRIURETIC PEPTIDE: CPT

## 2024-05-18 PROCEDURE — 6370000000 HC RX 637 (ALT 250 FOR IP): Performed by: NURSE PRACTITIONER

## 2024-05-18 PROCEDURE — A4216 STERILE WATER/SALINE, 10 ML: HCPCS

## 2024-05-18 PROCEDURE — 6370000000 HC RX 637 (ALT 250 FOR IP): Performed by: CLINICAL NURSE SPECIALIST

## 2024-05-18 PROCEDURE — 80053 COMPREHEN METABOLIC PANEL: CPT

## 2024-05-18 PROCEDURE — 6370000000 HC RX 637 (ALT 250 FOR IP): Performed by: HOSPITALIST

## 2024-05-18 RX ORDER — FOLIC ACID 1 MG/1
1 TABLET ORAL DAILY
Status: DISCONTINUED | OUTPATIENT
Start: 2024-05-18 | End: 2024-05-19 | Stop reason: HOSPADM

## 2024-05-18 RX ADMIN — SODIUM CHLORIDE, PRESERVATIVE FREE 5 ML: 5 INJECTION INTRAVENOUS at 09:06

## 2024-05-18 RX ADMIN — MIRTAZAPINE 7.5 MG: 15 TABLET, FILM COATED ORAL at 20:37

## 2024-05-18 RX ADMIN — Medication 100 MG: at 09:04

## 2024-05-18 RX ADMIN — RIFAXIMIN 550 MG: 550 TABLET ORAL at 20:36

## 2024-05-18 RX ADMIN — BUSPIRONE HYDROCHLORIDE 5 MG: 5 TABLET ORAL at 15:07

## 2024-05-18 RX ADMIN — MIDODRINE HYDROCHLORIDE 2.5 MG: 5 TABLET ORAL at 11:20

## 2024-05-18 RX ADMIN — METOPROLOL SUCCINATE 25 MG: 25 TABLET, FILM COATED, EXTENDED RELEASE ORAL at 09:04

## 2024-05-18 RX ADMIN — SODIUM CHLORIDE, PRESERVATIVE FREE 40 MG: 5 INJECTION INTRAVENOUS at 09:04

## 2024-05-18 RX ADMIN — LACTULOSE 20 G: 10 SOLUTION ORAL at 09:00

## 2024-05-18 RX ADMIN — SODIUM CHLORIDE, PRESERVATIVE FREE 40 MG: 5 INJECTION INTRAVENOUS at 20:36

## 2024-05-18 RX ADMIN — MIDODRINE HYDROCHLORIDE 2.5 MG: 5 TABLET ORAL at 09:05

## 2024-05-18 RX ADMIN — RIFAXIMIN 550 MG: 550 TABLET ORAL at 09:05

## 2024-05-18 RX ADMIN — MIDODRINE HYDROCHLORIDE 2.5 MG: 5 TABLET ORAL at 17:19

## 2024-05-18 RX ADMIN — BUSPIRONE HYDROCHLORIDE 5 MG: 5 TABLET ORAL at 20:36

## 2024-05-18 RX ADMIN — SODIUM CHLORIDE, PRESERVATIVE FREE 10 ML: 5 INJECTION INTRAVENOUS at 20:38

## 2024-05-18 RX ADMIN — EMPAGLIFLOZIN 10 MG: 10 TABLET, FILM COATED ORAL at 09:04

## 2024-05-18 RX ADMIN — ISOSORBIDE DINITRATE 10 MG: 10 TABLET ORAL at 17:20

## 2024-05-18 RX ADMIN — SODIUM CHLORIDE, PRESERVATIVE FREE 10 ML: 5 INJECTION INTRAVENOUS at 11:02

## 2024-05-18 RX ADMIN — HYDRALAZINE HYDROCHLORIDE 10 MG: 10 TABLET, FILM COATED ORAL at 06:23

## 2024-05-18 RX ADMIN — BUSPIRONE HYDROCHLORIDE 5 MG: 5 TABLET ORAL at 09:05

## 2024-05-18 RX ADMIN — ATORVASTATIN CALCIUM 20 MG: 20 TABLET, FILM COATED ORAL at 20:36

## 2024-05-18 RX ADMIN — Medication 3 MG: at 20:36

## 2024-05-18 RX ADMIN — FOLIC ACID 1 MG: 1 TABLET ORAL at 17:19

## 2024-05-18 RX ADMIN — ISOSORBIDE DINITRATE 10 MG: 10 TABLET ORAL at 09:04

## 2024-05-19 VITALS
RESPIRATION RATE: 18 BRPM | TEMPERATURE: 99.4 F | OXYGEN SATURATION: 100 % | HEART RATE: 82 BPM | DIASTOLIC BLOOD PRESSURE: 70 MMHG | BODY MASS INDEX: 34.06 KG/M2 | HEIGHT: 70 IN | SYSTOLIC BLOOD PRESSURE: 110 MMHG | WEIGHT: 237.88 LBS

## 2024-05-19 LAB
ALBUMIN SERPL-MCNC: 4 G/DL (ref 3.5–5.2)
ALP SERPL-CCNC: 135 U/L (ref 40–129)
ALT SERPL-CCNC: 10 U/L (ref 0–40)
ANION GAP SERPL CALCULATED.3IONS-SCNC: 10 MMOL/L (ref 7–16)
AST SERPL-CCNC: 19 U/L (ref 0–39)
BILIRUB SERPL-MCNC: 0.7 MG/DL (ref 0–1.2)
BUN SERPL-MCNC: 21 MG/DL (ref 6–20)
CALCIUM SERPL-MCNC: 9.3 MG/DL (ref 8.6–10.2)
CHLORIDE SERPL-SCNC: 106 MMOL/L (ref 98–107)
CO2 SERPL-SCNC: 21 MMOL/L (ref 22–29)
CREAT SERPL-MCNC: 1.9 MG/DL (ref 0.7–1.2)
GFR, ESTIMATED: 40 ML/MIN/1.73M2
GLUCOSE SERPL-MCNC: 99 MG/DL (ref 74–99)
INR PPP: 3.5
MAGNESIUM SERPL-MCNC: 1.8 MG/DL (ref 1.6–2.6)
POTASSIUM SERPL-SCNC: 4.1 MMOL/L (ref 3.5–5)
PROT SERPL-MCNC: 7.5 G/DL (ref 6.4–8.3)
PROTHROMBIN TIME: 38.2 SEC (ref 9.3–12.4)
SODIUM SERPL-SCNC: 137 MMOL/L (ref 132–146)

## 2024-05-19 PROCEDURE — 6360000002 HC RX W HCPCS

## 2024-05-19 PROCEDURE — 99239 HOSP IP/OBS DSCHRG MGMT >30: CPT | Performed by: INTERNAL MEDICINE

## 2024-05-19 PROCEDURE — 6370000000 HC RX 637 (ALT 250 FOR IP): Performed by: CLINICAL NURSE SPECIALIST

## 2024-05-19 PROCEDURE — 6370000000 HC RX 637 (ALT 250 FOR IP): Performed by: INTERNAL MEDICINE

## 2024-05-19 PROCEDURE — 2580000003 HC RX 258: Performed by: INTERNAL MEDICINE

## 2024-05-19 PROCEDURE — C9113 INJ PANTOPRAZOLE SODIUM, VIA: HCPCS

## 2024-05-19 PROCEDURE — 83735 ASSAY OF MAGNESIUM: CPT

## 2024-05-19 PROCEDURE — 6370000000 HC RX 637 (ALT 250 FOR IP)

## 2024-05-19 PROCEDURE — 6370000000 HC RX 637 (ALT 250 FOR IP): Performed by: HOSPITALIST

## 2024-05-19 PROCEDURE — 6370000000 HC RX 637 (ALT 250 FOR IP): Performed by: NURSE PRACTITIONER

## 2024-05-19 PROCEDURE — 85610 PROTHROMBIN TIME: CPT

## 2024-05-19 PROCEDURE — 36415 COLL VENOUS BLD VENIPUNCTURE: CPT

## 2024-05-19 PROCEDURE — A4216 STERILE WATER/SALINE, 10 ML: HCPCS

## 2024-05-19 PROCEDURE — 6370000000 HC RX 637 (ALT 250 FOR IP): Performed by: STUDENT IN AN ORGANIZED HEALTH CARE EDUCATION/TRAINING PROGRAM

## 2024-05-19 PROCEDURE — 2580000003 HC RX 258

## 2024-05-19 PROCEDURE — 80053 COMPREHEN METABOLIC PANEL: CPT

## 2024-05-19 RX ORDER — FOLIC ACID 1 MG/1
1 TABLET ORAL DAILY
Qty: 30 TABLET | Refills: 3 | Status: SHIPPED | OUTPATIENT
Start: 2024-05-20

## 2024-05-19 RX ORDER — MIDODRINE HYDROCHLORIDE 5 MG/1
5 TABLET ORAL
Status: DISCONTINUED | OUTPATIENT
Start: 2024-05-19 | End: 2024-05-19 | Stop reason: HOSPADM

## 2024-05-19 RX ORDER — BUSPIRONE HYDROCHLORIDE 5 MG/1
5 TABLET ORAL 3 TIMES DAILY
Qty: 90 TABLET | Refills: 0 | Status: SHIPPED | OUTPATIENT
Start: 2024-05-19

## 2024-05-19 RX ORDER — MIRTAZAPINE 7.5 MG/1
7.5 TABLET, FILM COATED ORAL NIGHTLY
Qty: 30 TABLET | Refills: 3 | Status: SHIPPED | OUTPATIENT
Start: 2024-05-19

## 2024-05-19 RX ORDER — HYDRALAZINE HYDROCHLORIDE 10 MG/1
10 TABLET, FILM COATED ORAL EVERY 8 HOURS SCHEDULED
Qty: 90 TABLET | Refills: 3 | Status: SHIPPED | OUTPATIENT
Start: 2024-05-19

## 2024-05-19 RX ORDER — LANOLIN ALCOHOL/MO/W.PET/CERES
100 CREAM (GRAM) TOPICAL DAILY
Qty: 30 TABLET | Refills: 3 | Status: SHIPPED | OUTPATIENT
Start: 2024-05-20

## 2024-05-19 RX ORDER — WARFARIN SODIUM 3 MG/1
3 TABLET ORAL
Status: DISCONTINUED | OUTPATIENT
Start: 2024-05-19 | End: 2024-05-19 | Stop reason: HOSPADM

## 2024-05-19 RX ORDER — ISOSORBIDE DINITRATE 10 MG/1
10 TABLET ORAL 3 TIMES DAILY
Qty: 90 TABLET | Refills: 3 | Status: SHIPPED | OUTPATIENT
Start: 2024-05-19

## 2024-05-19 RX ORDER — MIDODRINE HYDROCHLORIDE 5 MG/1
5 TABLET ORAL
Qty: 90 TABLET | Refills: 3 | Status: SHIPPED | OUTPATIENT
Start: 2024-05-19

## 2024-05-19 RX ORDER — NICOTINE 21 MG/24HR
1 PATCH, TRANSDERMAL 24 HOURS TRANSDERMAL DAILY
Qty: 30 PATCH | Refills: 0 | Status: SHIPPED | OUTPATIENT
Start: 2024-05-20

## 2024-05-19 RX ORDER — WARFARIN SODIUM 3 MG/1
3 TABLET ORAL DAILY
Qty: 30 TABLET | Refills: 0 | Status: SHIPPED | OUTPATIENT
Start: 2024-05-19

## 2024-05-19 RX ADMIN — MIDODRINE HYDROCHLORIDE 5 MG: 5 TABLET ORAL at 11:21

## 2024-05-19 RX ADMIN — FOLIC ACID 1 MG: 1 TABLET ORAL at 07:57

## 2024-05-19 RX ADMIN — SODIUM CHLORIDE, PRESERVATIVE FREE 10 ML: 5 INJECTION INTRAVENOUS at 09:18

## 2024-05-19 RX ADMIN — Medication 100 MG: at 07:56

## 2024-05-19 RX ADMIN — SODIUM CHLORIDE, PRESERVATIVE FREE 10 ML: 5 INJECTION INTRAVENOUS at 07:57

## 2024-05-19 RX ADMIN — HYDRALAZINE HYDROCHLORIDE 10 MG: 10 TABLET, FILM COATED ORAL at 14:21

## 2024-05-19 RX ADMIN — ISOSORBIDE DINITRATE 10 MG: 10 TABLET ORAL at 11:21

## 2024-05-19 RX ADMIN — SODIUM CHLORIDE, PRESERVATIVE FREE 40 MG: 5 INJECTION INTRAVENOUS at 07:56

## 2024-05-19 RX ADMIN — EMPAGLIFLOZIN 10 MG: 10 TABLET, FILM COATED ORAL at 07:56

## 2024-05-19 RX ADMIN — MIDODRINE HYDROCHLORIDE 2.5 MG: 5 TABLET ORAL at 07:55

## 2024-05-19 RX ADMIN — BUSPIRONE HYDROCHLORIDE 5 MG: 5 TABLET ORAL at 07:56

## 2024-05-19 RX ADMIN — BUSPIRONE HYDROCHLORIDE 5 MG: 5 TABLET ORAL at 14:21

## 2024-05-19 RX ADMIN — ISOSORBIDE DINITRATE 10 MG: 10 TABLET ORAL at 07:56

## 2024-05-19 RX ADMIN — LACTULOSE 20 G: 10 SOLUTION ORAL at 07:55

## 2024-05-19 RX ADMIN — METOPROLOL SUCCINATE 25 MG: 25 TABLET, FILM COATED, EXTENDED RELEASE ORAL at 07:56

## 2024-05-19 RX ADMIN — RIFAXIMIN 550 MG: 550 TABLET ORAL at 07:56

## 2024-05-19 NOTE — DISCHARGE SUMMARY
[facility] Hospitalist       Hospitalist Physician Discharge Summary       Federico Riddle MD  [address] [facility] [id number] [phone]    Schedule an appointment as soon as possible for a visit  Call and schedule appointment as soon as possible - want you to have an appointment at least 2 weeks after discharge - can call with questions or schedule appointment to be seen before.    Jama Skinner PA  [address] [facility] [id number] [phone]    Follow up      Osiel Arias MD  [address] [address]  [phone]    Follow up      Omar Joiner MD  [address] [facility] [address] [phone]    Follow up        Activity level: As tolerated    Diet: ADULT DIET; Dysphagia - Soft and Bite Sized; Mildly Thick ([place])  ADULT ORAL NUTRITION SUPPLEMENT; Lunch, Dinner; Frozen Oral Supplement    Dispo: Home with self care    Condition on Discharge - stable     Patient ID:  Len Dorsey  [id number]  58 y.o.  1965    Admit date: 5/7/2024    Discharge date and time:  5/19/2024  3:01 PM    Admission Diagnoses: Principal Problem:    Chest pain  Active Problems:    Hypertension    Hx of CABG    Warfarin anticoagulation    Subtherapeutic international normalized ratio (INR)    Alcohol withdrawal syndrome without complication (HCC)    Alcoholic liver disease, unspecified (HCC)    Thrombocytopenia (HCC)    Delirium  Resolved Problems:    * No resolved hospital problems. *      Discharge Diagnoses: Principal Problem:    Chest pain  Active Problems:    Hypertension    Hx of CABG    Warfarin anticoagulation    Subtherapeutic international normalized ratio (INR)    Alcohol withdrawal syndrome without complication (HCC)    Alcoholic liver disease, unspecified (HCC)    Thrombocytopenia (HCC)    Delirium  Resolved Problems:    * No resolved hospital problems. *      Consults:  IP CONSULT TO SOCIAL WORK  IP CONSULT TO CARDIOLOGY  IP CONSULT TO GI  IP

## 2024-05-19 NOTE — PROGRESS NOTES
[facility] Hospitalist Progress Note    Admitting Date and Time: 5/7/2024  8:56 AM  Admit Dx: Chest pain [R07.9]  Supratherapeutic INR [R79.1]  Alcohol withdrawal syndrome without complication (HCC) [F10.930]  Chest pain, unspecified type [R07.9]    Subjective:  Patient is being followed for Chest pain [R07.9]  Supratherapeutic INR [R79.1]  Alcohol withdrawal syndrome without complication (HCC) [F10.930]  Chest pain, unspecified type [R07.9]   Pt was seen and examined.     ROS: denies fever, chills, cp, sob, n/v, HA unless stated above.      midodrine  2.5 mg Oral TID WC    PHENobarbital  65 mg IntraVENous 2 times per day    nicotine  1 patch TransDERmal Daily    lactulose  20 g Oral Daily    PHENobarbital  130 mg IntraVENous Once    warfarin placeholder: dosing by pharmacy   Other RX Placeholder    pantoprazole (PROTONIX) 40 mg in sodium chloride (PF) 0.9 % 10 mL injection  40 mg IntraVENous Q12H    rifAXIMin  550 mg Oral BID    [Held by provider] hydrALAZINE  10 mg Oral 3 times per day    [Held by provider] isosorbide dinitrate  5 mg Oral TID    thiamine  100 mg Oral Daily    atorvastatin  20 mg Oral Nightly    metoprolol succinate  25 mg Oral BID    [Held by provider] spironolactone  25 mg Oral Daily    traZODone  50 mg Oral Nightly    [Held by provider] bumetanide  2 mg Oral Daily    sodium chloride flush  5-40 mL IntraVENous 2 times per day     lidocaine, , PRN  heparin (porcine), 4,000 Units, PRN  heparin (porcine), 2,000 Units, PRN  LORazepam, 1 mg, Q1H PRN   Or  LORazepam, 1 mg, Q1H PRN   Or  LORazepam, 2 mg, Q1H PRN   Or  LORazepam, 2 mg, Q1H PRN   Or  LORazepam, 3 mg, Q1H PRN   Or  LORazepam, 3 mg, Q1H PRN   Or  LORazepam, 4 mg, Q1H PRN   Or  LORazepam, 4 mg, Q1H PRN  sodium chloride flush, 5-40 mL, PRN  sodium chloride, , PRN  potassium chloride, 40 mEq, PRN   Or  potassium alternative oral replacement, 40 mEq, PRN   Or  potassium chloride, 10 mEq, PRN  magnesium sulfate, 2,000 mg, 
       [facility] Hospitalist Progress Note    Admitting Date and Time: 5/7/2024  8:56 AM  Admit Dx: Chest pain [R07.9]  Supratherapeutic INR [R79.1]  Alcohol withdrawal syndrome without complication (HCC) [F10.930]  Chest pain, unspecified type [R07.9]    Subjective:  Patient is being followed for Chest pain [R07.9]  Supratherapeutic INR [R79.1]  Alcohol withdrawal syndrome without complication (HCC) [F10.930]  Chest pain, unspecified type [R07.9]     No acute events overnight    ROS: denies fever, chills, cp, sob, n/v, HA unless stated above.      thiamine  100 mg Oral Daily    atorvastatin  20 mg Oral Nightly    metoprolol succinate  25 mg Oral BID    spironolactone  25 mg Oral Daily    traZODone  50 mg Oral Nightly    bumetanide  2 mg Oral Daily    pantoprazole (PROTONIX) 40 mg in sodium chloride (PF) 0.9 % 10 mL injection  40 mg IntraVENous Q12H    sodium chloride flush  5-40 mL IntraVENous 2 times per day     LORazepam, 1 mg, Q1H PRN   Or  LORazepam, 1 mg, Q1H PRN   Or  LORazepam, 2 mg, Q1H PRN   Or  LORazepam, 2 mg, Q1H PRN   Or  LORazepam, 3 mg, Q1H PRN   Or  LORazepam, 3 mg, Q1H PRN   Or  LORazepam, 4 mg, Q1H PRN   Or  LORazepam, 4 mg, Q1H PRN  sodium chloride flush, 5-40 mL, PRN  sodium chloride, , PRN  potassium chloride, 40 mEq, PRN   Or  potassium alternative oral replacement, 40 mEq, PRN   Or  potassium chloride, 10 mEq, PRN  magnesium sulfate, 2,000 mg, PRN  polyethylene glycol, 17 g, Daily PRN  acetaminophen, 650 mg, Q6H PRN   Or  acetaminophen, 650 mg, Q6H PRN  prochlorperazine, 10 mg, Q8H PRN   Or  prochlorperazine, 10 mg, Q6H PRN         Objective:    BP (!) 140/84   Pulse 75   Temp 98 °F (36.7 °C) (Oral)   Resp 18   Ht 1.778 m (5' 10\")   Wt 108.9 kg (240 lb)   SpO2 98%   BMI 34.44 kg/m²     General Appearance: lethargic  Skin: warm and dry  Head: normocephalic and atraumatic  Eyes: pupils equal, round, extraocular eye movements intact, conjunctivae normal  Pulmonary/Chest: clear to 
       [facility] Hospitalist Progress Note    Admitting Date and Time: 5/7/2024  8:56 AM  Admit Dx: Chest pain [R07.9]  Supratherapeutic INR [R79.1]  Alcohol withdrawal syndrome without complication (HCC) [F10.930]  Chest pain, unspecified type [R07.9]    Subjective:  Patient is being followed for Chest pain [R07.9]  Supratherapeutic INR [R79.1]  Alcohol withdrawal syndrome without complication (HCC) [F10.930]  Chest pain, unspecified type [R07.9]   Patient was seen and examined.   ROS: denies fever, chills, cp, sob, n/v, HA unless stated above.      warfarin  5 mg Oral Once    PHENobarbital  65 mg IntraVENous 2 times per day    nicotine  1 patch TransDERmal Daily    lactulose  20 g Oral Daily    PHENobarbital  130 mg IntraVENous Once    warfarin placeholder: dosing by pharmacy   Other RX Placeholder    pantoprazole (PROTONIX) 40 mg in sodium chloride (PF) 0.9 % 10 mL injection  40 mg IntraVENous Q12H    rifAXIMin  550 mg Oral BID    [Held by provider] hydrALAZINE  10 mg Oral 3 times per day    [Held by provider] isosorbide dinitrate  5 mg Oral TID    thiamine  100 mg Oral Daily    atorvastatin  20 mg Oral Nightly    metoprolol succinate  25 mg Oral BID    [Held by provider] spironolactone  25 mg Oral Daily    traZODone  50 mg Oral Nightly    [Held by provider] bumetanide  2 mg Oral Daily    sodium chloride flush  5-40 mL IntraVENous 2 times per day     heparin (porcine), 4,000 Units, PRN  heparin (porcine), 2,000 Units, PRN  LORazepam, 1 mg, Q1H PRN   Or  LORazepam, 1 mg, Q1H PRN   Or  LORazepam, 2 mg, Q1H PRN   Or  LORazepam, 2 mg, Q1H PRN   Or  LORazepam, 3 mg, Q1H PRN   Or  LORazepam, 3 mg, Q1H PRN   Or  LORazepam, 4 mg, Q1H PRN   Or  LORazepam, 4 mg, Q1H PRN  sodium chloride flush, 5-40 mL, PRN  sodium chloride, , PRN  potassium chloride, 40 mEq, PRN   Or  potassium alternative oral replacement, 40 mEq, PRN   Or  potassium chloride, 10 mEq, PRN  magnesium sulfate, 2,000 mg, PRN  polyethylene glycol, 17 g, 
       [facility] Hospitalist Progress Note    Admitting Date and Time: 5/7/2024  8:56 AM  Admit Dx: Chest pain [R07.9]  Supratherapeutic INR [R79.1]  Alcohol withdrawal syndrome without complication (HCC) [F10.930]  Chest pain, unspecified type [R07.9]    Subjective:  Patient is being followed for Chest pain [R07.9]  Supratherapeutic INR [R79.1]  Alcohol withdrawal syndrome without complication (HCC) [F10.930]  Chest pain, unspecified type [R07.9]   Pt was seen and examined.   ROS: denies fever, chills, cp, sob, n/v, HA unless stated above.      heparin (porcine)  4,000 Units IntraVENous Once    rifAXIMin  400 mg Oral TID    lactulose  20 g Oral BID    hydrALAZINE  10 mg Oral 3 times per day    isosorbide dinitrate  5 mg Oral TID    pantoprazole (PROTONIX) 40 mg in sodium chloride (PF) 0.9 % 10 mL injection  40 mg IntraVENous Q6H    thiamine  100 mg Oral Daily    atorvastatin  20 mg Oral Nightly    metoprolol succinate  25 mg Oral BID    spironolactone  25 mg Oral Daily    traZODone  50 mg Oral Nightly    bumetanide  2 mg Oral Daily    sodium chloride flush  5-40 mL IntraVENous 2 times per day     heparin (porcine), 4,000 Units, PRN  heparin (porcine), 2,000 Units, PRN  sodium chloride, , PRN  LORazepam, 1 mg, Q1H PRN   Or  LORazepam, 1 mg, Q1H PRN   Or  LORazepam, 2 mg, Q1H PRN   Or  LORazepam, 2 mg, Q1H PRN   Or  LORazepam, 3 mg, Q1H PRN   Or  LORazepam, 3 mg, Q1H PRN   Or  LORazepam, 4 mg, Q1H PRN   Or  LORazepam, 4 mg, Q1H PRN  sodium chloride flush, 5-40 mL, PRN  sodium chloride, , PRN  potassium chloride, 40 mEq, PRN   Or  potassium alternative oral replacement, 40 mEq, PRN   Or  potassium chloride, 10 mEq, PRN  magnesium sulfate, 2,000 mg, PRN  polyethylene glycol, 17 g, Daily PRN  acetaminophen, 650 mg, Q6H PRN   Or  acetaminophen, 650 mg, Q6H PRN  prochlorperazine, 10 mg, Q8H PRN   Or  prochlorperazine, 10 mg, Q6H PRN         Objective:    BP (!) 98/56   Pulse 75   Temp 97.8 °F (36.6 °C) (Oral)   Resp 
      Inpatient Cardiology Consultation      Reason for Consult:  Chest Pain     Consulting Physician: Dr Hicks     Requesting Physician:  Dr. Lerbon    Date of Consultation: 5/9/2024    HISTORY OF PRESENT ILLNESS:   Len Dorsey  is a 58 y.o.  male known to [facility] cardiology Dr Bustos - seen in office 5/23/23  Seen most recently by Dr. Lyles during inpatient admission 2/24/2024.    Interim:  Awaiting echocardiogram      Past Medical History: Obtained through extensive review of records found in care everywhere  D  [place] bileaflet mechanical AVR and [place] bileaflet MVR mechanical in September 2020  status post redo mechanical MVR and AVR in March 2023 at  attributed to endocarditis   Chronic warfarin goal INR 2.5-3.5  CAD   s/p CABG LIMA  to LAD at same time as valve replacement 9/14/2020  Lexiscan Stress Test 5/2022: Normal MPI with attenuation artifact. LVEF 54% with normal wall motion.  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  Alcohol abuse  History of seizures from alcohol withdrawal  Alcoholic liver disease  Fatty liver infiltration  Left breast mass with negative ultrasound  Colon polyps  Testicular cancer status post surgery and chemotherapy 1988 in [place]  Vitamin D 
     [facility] Hospitalist   Progress Note    Admitting Date and Time: 5/7/2024  8:56 AM  Admit Dx: Chest pain [R07.9]  Supratherapeutic INR [R79.1]  Alcohol withdrawal syndrome without complication (HCC) [F10.930]  Chest pain, unspecified type [R07.9]    Subjective: Admitted on seventh, presented with shortness of breath, patient with alcoholic liver disease, prior CVA, prosthetic aortic valve replacement, presented also with shortness of breath.  Patient also with anemia, supratherapeutic INR on presentation, depression.  Evidence of lactic acidosis as well as chronic renal failure.  Patient seen by cardiology on eighth, seen by psychiatry on eighth, outpatient follow-up recommended at [facility].  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  Patient with evidence of hepatic encephalopathy.  Patient did have pacer interrogated.AM PAC of 17/24.  Patient had EGD on ninth, evidence of gastritis, mild portal hypertensive gastropathy.  Evidence of mild to moderate duodenitis.  Octreotide drip was discontinued on GI with plan for PPI twice daily for 8 weeks.  No varices were noted.  No NSAIDs.  Outpatient colonoscopy.  Patient did need input from critical care due to alcohol withdrawal.  Patient with issues with low BP, when his hydralazine and Isordil were held from cardiology side.  Patient remains on IV heparin.  Patient once again seen by psychiatry on 13th due to hallucinations, impression of resolving delirium.    Patient was admitted with Chest pain [R07.9]  Supratherapeutic INR [R79.1]  Alcohol withdrawal syndrome without complication (HCC) [F10.930]  Chest pain, unspecified type [R07.9]. Patient is resting in bed, sleeping, still confused, still under impression president is Georges Gutierrez, not in distress.    Per RN: Did receive 3 mg of Ativan today morning as per FAWN.  Patient was also noted on phenobarb at home.    ROS: denies fever, chills, cp, 
     [facility] Hospitalist   Progress Note    Admitting Date and Time: 5/7/2024  8:56 AM  Admit Dx: Chest pain [R07.9]  Supratherapeutic INR [R79.1]  Alcohol withdrawal syndrome without complication (HCC) [F10.930]  Chest pain, unspecified type [R07.9]    Subjective: Admitted on seventh, presented with shortness of breath, patient with alcoholic liver disease, prior CVA, prosthetic aortic valve replacement, presented also with shortness of breath.  Patient also with anemia, supratherapeutic INR on presentation, depression.  Evidence of lactic acidosis as well as chronic renal failure.  Patient seen by cardiology on eighth, seen by psychiatry on eighth, outpatient follow-up recommended at [facility].  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  Patient with evidence of hepatic encephalopathy.  Patient did have pacer interrogated.AM PAC of 17/24.  Patient had EGD on ninth, evidence of gastritis, mild portal hypertensive gastropathy.  Evidence of mild to moderate duodenitis.  Octreotide drip was discontinued on GI with plan for PPI twice daily for 8 weeks.  No varices were noted.  No NSAIDs.  Outpatient colonoscopy.  Patient did need input from critical care due to alcohol withdrawal.  Patient with issues with low BP, when his hydralazine and Isordil were held from cardiology side.  Patient remains on IV heparin.  Patient once again seen by psychiatry on 13th due to hallucinations, impression of resolving delirium.    Patient was admitted with Chest pain [R07.9]  Supratherapeutic INR [R79.1]  Alcohol withdrawal syndrome without complication (HCC) [F10.930]  Chest pain, unspecified type [R07.9]. Patient feels comfortable, resting in bed, awake, avoids eye contact, somewhat groggy, does answer, does say he had fiancé however at this time lives alone, not too much forthcoming in conversation.    Per RN: No new complaints.    ROS: denies fever, chills, cp, 
     [facility] Hospitalist   Progress Note    Admitting Date and Time: 5/7/2024  8:56 AM  Admit Dx: Chest pain [R07.9]  Supratherapeutic INR [R79.1]  Alcohol withdrawal syndrome without complication (HCC) [F10.930]  Chest pain, unspecified type [R07.9]    Subjective: Admitted on seventh, presented with shortness of breath, patient with alcoholic liver disease, prior CVA, prosthetic aortic valve replacement, presented also with shortness of breath.  Patient also with anemia, supratherapeutic INR on presentation, depression.  Evidence of lactic acidosis as well as chronic renal failure.  Patient seen by cardiology on eighth, seen by psychiatry on eighth, outpatient follow-up recommended at [facility].  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  Patient with evidence of hepatic encephalopathy.  Patient did have pacer interrogated.AM PAC of 11/24.  Patient had EGD on ninth, evidence of gastritis, mild portal hypertensive gastropathy.  Evidence of mild to moderate duodenitis.  Octreotide drip was discontinued on GI with plan for PPI twice daily for 8 weeks.  No varices were noted.  No NSAIDs.  Outpatient colonoscopy.  Patient did need input from critical care due to alcohol withdrawal.  Patient with issues with low BP, when his hydralazine and Isordil were held from cardiology side.  Patient remains on IV heparin.  Patient once again seen by psychiatry on 13th due to hallucinations, impression of resolving delirium.  Patient seen by speech on 15th, also on 16th with improvement, has been restarted on oral medications, cardiology will see only as needed, renal still watching kidney functions closely, diuretics still remains on hold,    Patient was admitted with Chest pain [R07.9]  Supratherapeutic INR [R79.1]  Alcohol withdrawal syndrome without complication (HCC) [F10.930]  Chest pain, unspecified type [R07.9]. Patient is resting in bed, definitely more 
    INPATIENT CARDIOLOGY FOLLOW-UP    Name: Len Dorsey    Age: 58 y.o.    Date of Admission: 5/7/2024  8:56 AM    Date of Service: 5/13/2024    Chief Complaint: Follow-up for chest pain     Interim History:  No new overnight cardiac complaints. Currently with no complaints of CP, SOB, palpitations, dizziness, or lightheadedness. Still a little confused and responding appropriately.  SR on telemetry.    Review of Systems:   Cardiac: As per HPI  General: No fever, chills  Pulmonary: As per HPI  HEENT: No visual disturbances, difficult swallowing  GI: No nausea, vomiting  Endocrine: No thyroid disease or DM  Musculoskeletal: HALL x 4, no focal motor deficits  Skin: Intact, no rashes  Neuro/Psych: No headache or seizures    Problem List:  Patient Active Problem List   Diagnosis    Chest pain    Closed fracture of nasal bones    Injury of face    Retroperitoneal hematoma    Vitamin D deficiency    Acute chest pain    Acute cholecystitis    Gastrointestinal hemorrhage    Polyp of colon    Right upper quadrant abdominal pain    Diarrhea    Current moderate episode of major depressive disorder without prior episode (HCC)    Hypertension    S/P MVR (mitral valve replacement)    S/P AVR (aortic valve replacement)    Coronary artery disease involving native coronary artery of native heart without angina pectoris    Paravalvular leak (prosthetic valve)    Hx of CABG    Warfarin anticoagulation    Subtherapeutic international normalized ratio (INR)    Moderate obesity    Dyspnea    Alcohol withdrawal syndrome without complication (HCC)    DTs (delirium tremens) (HCC)    Anemia    Severe protein-calorie malnutrition (HCC)    Acute decompensated heart failure (HCC)    Aortic valve disease    Mitral valve disease    Atrioventricular block    Pure hypercholesterolemia    Chronic obstructive pulmonary disease (HCC)    Loculated pleural effusion    Troponin level elevated    Stage 2 chronic kidney disease    Alcohol use    
  [facility] Quality Flow/Interdisciplinary Rounds Progress Note        Quality Flow Rounds held on May 10, 2024    Disciplines Attending:  Bedside Nurse, , , and Nursing Unit Leadership    Len Dorsey was admitted on 5/7/2024  8:56 AM    Anticipated Discharge Date:  Expected Discharge Date: 05/11/24    Disposition: TBD    Jasvir Score:  Jasvir Scale Score: 20    Readmission Risk              Risk of Unplanned Readmission:  30           Discussed patient goal for the day, patient clinical progression, and barriers to discharge.  The following Goal(s) of the Day/Commitment(s) have been identified:   discharge planning, heparin gtt, monitor labs, CIWA management       Elie Brasher RN  May 10, 2024        
  [facility] Quality Flow/Interdisciplinary Rounds Progress Note        Quality Flow Rounds held on May 8, 2024    Disciplines Attending:  Bedside Nurse, , , and Nursing Unit Leadership    Len Dorsey was admitted on 5/7/2024  8:56 AM    Anticipated Discharge Date:       Disposition:    Jasvir Score:  Jasvir Scale Score: 17    Readmission Risk              Risk of Unplanned Readmission:  29           Discussed patient goal for the day, patient clinical progression, and barriers to discharge.  The following Goal(s) of the Day/Commitment(s) have been identified:   await cdiff, ciwa scale, cardio eval, sandostatin gtt, wean oxygen as able.       Kelin Paulino, RN  May 8, 2024        
  P Quality Flow/Interdisciplinary Rounds Progress Note        Quality Flow Rounds held on May 9, 2024    Disciplines Attending:  Bedside Nurse, , , and Nursing Unit Leadership    Len Dorsey was admitted on 5/7/2024  8:56 AM    Anticipated Discharge Date:  Expected Discharge Date: 05/11/24    Disposition: home    Jasvir Score:  Jasvir Scale Score: 20    Readmission Risk              Risk of Unplanned Readmission:  27           Discussed patient goal for the day, patient clinical progression, and barriers to discharge.  The following Goal(s) of the Day/Commitment(s) have been identified:   claudette Hough, RN  May 9, 2024        
  Physician Progress Note      PATIENT:               ZAN GARZA  CSN #:                  [id number]  :                       1965  ADMIT DATE:       2024 8:56 AM  DISCH DATE:  RESPONDING  PROVIDER #:        Darnell Hicks MD          QUERY TEXT:    Pt admitted with Alcohol abuse with withdrawal and has respiratory failure   documented. If possible, please document in progress notes and discharge   summary further specificity regarding the type and acuity of respiratory   failure:    The medical record reflects the following:  Risk Factors: Alcohol Liver Disease, Stroke, Prosthetic Aortic Valve   replacement  Clinical Indicators: Per Cardio consult - Hypoxic respiratory failure noted   under assessment. Lungs: Clear to auscultation bilaterally. No wheezes,   rales, or rhonchi.  SpO2 89% on 1L BNC- 99% on RA. Per Cardio - Overall   hypoxic respiratory failure, improved.  Treatment: supplemental oxygen,    Thank You  JEANETTE Atkins, RN  Clinical Documentation Integrity  Options provided:  -- Acute respiratory failure with hypoxia  -- Acute respiratory failure with hypercapnia  -- Chronic respiratory failure with hypoxia  -- Chronic respiratory failure with hypercapnia  -- Acute on chronic respiratory failure with hypoxia  -- Acute on chronic respiratory failure with hypercapnia  -- Other - I will add my own diagnosis  -- Disagree - Not applicable / Not valid  -- Disagree - Clinically unable to determine / Unknown  -- Refer to Clinical Documentation Reviewer    PROVIDER RESPONSE TEXT:    This patient is in acute on chronic respiratory failure with hypoxia.    Query created by: Jae Graham on 2024 11:04 AM      Electronically signed by:  Darnell Hicks MD 5/15/2024 1:26 PM          
  Physician Progress Note      PATIENT:               ZAN GARZA  [facility] #:                  [id number]  :                       1965  ADMIT DATE:       2024 8:56 AM  DISCH DATE:  RESPONDING  PROVIDER #:        Rubi Gross DO          QUERY TEXT:    Pt admitted with hematemesis and has anemia documented. If possible, please   document in progress notes and discharge summary further specificity regarding   the acuity and type of anemia:    The medical record reflects the following:  Risk Factors: alcoholic liver disease  Clinical Indicators: EGD-  Antrum with mild gastritis with biopsy. Bulb with   mild to moderate duodenitis, no ulcers or vessels seen.  No AVM's.  Hgb 8.9,   Hct 30.3 Per H&P--Anemia and according to the patient he did have vomiting of   blood yesterday about 6 ounces nothing today.  Will consult GI I will reverse   his INR with vitamin K  Treatment: IV PPI, IV octreotide, EGD, GI consult,    Thank You  JEANETTE Atkins, RN  Clinical Documentation Integrity  Options provided:  -- Anemia due to acute blood loss  -- Anemia due to chronic blood loss  -- Anemia due to acute on chronic blood loss  -- Anemia due to iron deficiency  -- Other - I will add my own diagnosis  -- Disagree - Not applicable / Not valid  -- Disagree - Clinically unable to determine / Unknown  -- Refer to Clinical Documentation Reviewer    PROVIDER RESPONSE TEXT:    This patient has acute on chronic blood loss anemia.    Query created by: Jae Graham on 5/10/2024 11:33 AM      Electronically signed by:  Rubi Gross DO 5/10/2024 12:09 PM          
  Physician Progress Note      PATIENT:               ZAN GARZA  [facility] #:                  [id number]  :                       1965  ADMIT DATE:       2024 8:56 AM  DISCH DATE:  RESPONDING  PROVIDER #:        Darnell Hicks MD          QUERY TEXT:    Pt admitted with hematemesis and has CHF documented. If possible, please   document in progress notes and discharge summary further specificity regarding   the type and acuity of CHF:    The medical record reflects the following:  Risk Factors: CAD, HTN  Clinical Indicators: Per H&P: HFrEF- 2024 ECHO LVEF 45-50%. Per cardiac -   Given patient heart failure with mildly reduced EF, we will continue   metoprolol succinate as well as add Isordil 5 mg p.o. 3 times a day and   hydralazine 10 mg p.o. 3 times a day for afterload reduction given kidney   dysfunction  Treatment:cardiac consult, metoprolol, Isordil, hydralazine,    Thank You  JEANETTE Atkins, RN  Clinical Documentation Integrity  Options provided:  -- Acute on Chronic Systolic CHF/HFrEF  -- Acute on Chronic Diastolic CHF/HFpEF  -- Acute on Chronic Systolic and Diastolic CHF  -- Acute Systolic CHF/HFrEF  -- Acute Diastolic CHF/HFpEF  -- Acute Systolic and Diastolic CHF  -- Chronic Systolic CHF/HFrEF  -- Chronic Diastolic CHF/HFpEF  -- Chronic Systolic and Diastolic CHF  -- Other - I will add my own diagnosis  -- Disagree - Not applicable / Not valid  -- Disagree - Clinically unable to determine / Unknown  -- Refer to Clinical Documentation Reviewer    PROVIDER RESPONSE TEXT:    This patient is in acute on chronic systolic CHF/HFrEF.    Query created by: Jae Graham on 5/10/2024 11:37 AM      Electronically signed by:  Darnell Hicks MD 2024 11:00 AM          
  SPEECH/LANGUAGE PATHOLOGY  Clinical Re-Assessment of Swallow Function    PATIENT NAME:  Len Dorsey  (male)     MRN:  [id number]    :  1965  (58 y.o.)      TODAY'S DATE:  2024    EVALUATING THERAPIST: Elham Barron, SLP                 RESULTS:    DYSPHAGIA DIAGNOSIS:   Clinical indicators of mild-moderate oropharyngeal phase dysphagia       DIET RECOMMENDATIONS:  Soft and bite size consistency solids (IDDSI level 6) with  nectar consistency (mildly thick - IDDSI level 2) liquids     FEEDING RECOMMENDATIONS:     Assistance level:  Stand by/ Hand over Hand assistance is needed during all oral intake      Compensatory strategies recommended: Thorough oral care to prevent colonization of oral bacteria , Upright in bed/ chair as tolerated, Fully alert for all PO, Small bites/sips, and Small, SINGLE cup sips only      Discussed recommendations with nursing and/or faxed report to referring provider: Yes    RN cleared patient for participation in assessment     yes       PATIENT REPORT/COMPLAINT: patient currently NPO pending results of this evaluation  Current Diet Order:  ADULT DIET; Dysphagia - Soft and Bite Sized; Mildly Thick (Nectar)    PROCEDURE:  Consistencies Administered During the Evaluation   Liquids: thin liquid and nectar thick liquid   Solids:  Pureed  and Hard solid      Method of Intake:   cup, straw, spoon  Self fed      Position:   Sitting in bed with head elevated above 75 degrees    CLINICAL ASSESSMENT:  Oral Stage:       prolonged mastication resulting in functional recollection and bolus formation       Pharyngeal Stage:    Throat clearing present after presentation of thin liquid  Latent wet cough was noted after presentation of thin liquid    Cognition:   Confusion noted    Oral Peripheral Examination   Generalized oral weakness    Current Respiratory Status    room air     Parameters of Speech Production  Respiration:  Shortness of breath  Quality:   Within functional 
 made aware of pt's soft BP (100/88) this am. Okay to hold metoprolol for now. Parameter order: hold if SBP <100 & HR <50.  
 went to follow up with patient while rounding. Patient was sleeping.   prayed a silent prayer for the patient.  Chaplains will remain available to offer spiritual and emotional support as needed.  
 went to see patient while rounding. Patient was sleeping.   prayed a silent prayer for the patient,  Chaplains will remain available to offer spiritual and emotional support as needed.  
4 Eyes Skin Assessment     NAME:  Len Dorsey  YOB: 1965  MEDICAL RECORD NUMBER:  [id number]    The patient is being assessed for  Admission    I agree that at least one RN has performed a thorough Head to Toe Skin Assessment on the patient. ALL assessment sites listed below have been assessed.      Areas assessed by both nurses:    Head, Face, Ears, Shoulders, Back, Chest, Arms, Elbows, Hands, Sacrum. Buttock, Coccyx, Ischium, Legs. Feet and Heels, and Under Medical Devices         Does the Patient have a Wound? No noted wound(s)       Jasvir Prevention initiated by RN: Yes  Wound Care Orders initiated by RN: No    Pressure Injury (Stage 3,4, Unstageable, DTI, NWPT, and Complex wounds) if present, place Wound referral order by RN under : No    New Ostomies, if present place, Ostomy referral order under : No     Nurse 1 eSignature: Electronically signed by ARLENE CUETO RN on 5/8/24 at 12:20 AM EDT    **SHARE this note so that the co-signing nurse can place an eSignature**    Nurse 2 eSignature: Electronically signed by Megan Waldrop RN on 5/8/24 at 12:21 AM EDT   
Attempted to call domestic partner again on transfer toS, room 434. Message went to voicemail.    
Attempted to call report to ICU, awaiting bed assignment  
Called Domestic Partner Leonie multiple times, attempted to get informed consent for EGD and blood transfusion for tomorrow. No answer, left messages. Awaiting call back.   
Comprehensive Nutrition Assessment    Type and Reason for Visit:  Initial (ICU/LOS)    Nutrition Recommendations/Plan:   Continue current diet and ONS, as tolerated  If pt PO inadequate d/t AMS, recommend consider EN support  Continue inpatient monitoring      Malnutrition Assessment:  Malnutrition Status:  At risk for malnutrition (Comment) (05/11/24 1124)    Context:  Acute Illness     Findings of the 6 clinical characteristics of malnutrition:  Energy Intake:  50% or less of estimated energy requirements for 5 or more days  Weight Loss:  Unable to assess (6.6% loss in 3 mo but also hx CHF, CKD)     Body Fat Loss:  No significant body fat loss     Muscle Mass Loss:  No significant muscle mass loss    Fluid Accumulation:  Unable to assess (Multiple factors)     Strength:  Not Performed    Nutrition Assessment:    Pt admit d/t chest pain and ETOH withdrawal. Transferred to ICU d/t worsening withdrawal symptoms. Pertinent hx=ETOH associated liver disease, CVA, CKD, CHF, remote hx testicular CA. He has been lethargic/confused. Recommend consider EN support if mentation prevents adequate PO.    Nutrition Related Findings:    BUN/creat 26/2.3, GFR 33, CIWA protocol, A&Ox1, N/V, soft round abd +BS, EGD 5/9-no acute bleeding source, ammonia WNL Wound Type: None (abrasion)       Current Nutrition Intake & Therapies:    Average Meal Intake: 26-50%, 1-25%  Average Supplements Intake: None Ordered  ADULT DIET; Regular; Low Sodium (2 gm)  ADULT ORAL NUTRITION SUPPLEMENT; Breakfast; Clear Liquid Oral Supplement  ADULT ORAL NUTRITION SUPPLEMENT; Lunch, Dinner; Frozen Oral Supplement    Anthropometric Measures:  Height: 177.8 cm (5' 10\")  Ideal Body Weight (IBW): 166 lbs (75 kg)    Admission Body Weight: 107 kg (235 lb 14.3 oz) (5/11 bedscale)  Current Body Weight: 107 kg (235 lb 14.3 oz), 142.1 % IBW. Weight Source: Bed Scale (5/11)  Current BMI (kg/m2): 33.8  Usual Body Weight: 114.5 kg (252 lb 8 oz) (2/28/24 bedscale)  % 
Consult called to 's office. Awaiting call back  
Coude catheter removed. Due to void between 9428-9119.  
Department of Internal Medicine  Nephrology Progress Note  Events reviewed.    SUBJECTIVE: We are following Len Dorsey for FRANTZ. Patient reports no complaints.       PHYSICAL EXAM:      Vitals:    VITALS:  /88   Pulse 63   Temp 98.1 °F (36.7 °C) (Oral)   Resp 16   Ht 1.778 m (5' 10\")   Wt 106.6 kg (235 lb 0.2 oz)   SpO2 95%   BMI 33.72 kg/m²   24HR BLOOD PRESSURE RANGE:  Systolic (24hrs), Av , Min:101 , Max:175   ; Diastolic (24hrs), Av, Min:66, Max:97  24HR INTAKE/OUTPUT:    Intake/Output Summary (Last 24 hours) at 2024 0853  Last data filed at 2024 0542  Gross per 24 hour   Intake --   Output 1600 ml   Net -1600 ml       Scheduled Meds:   isosorbide dinitrate  10 mg Oral TID    midodrine  2.5 mg Oral TID WC    empagliflozin  10 mg Oral Daily    busPIRone  5 mg Oral TID    melatonin  3 mg Oral QPM    mirtazapine  7.5 mg Oral Nightly    nicotine  1 patch TransDERmal Daily    lactulose  20 g Oral Daily    PHENobarbital  130 mg IntraVENous Once    warfarin placeholder: dosing by pharmacy   Other RX Placeholder    pantoprazole (PROTONIX) 40 mg in sodium chloride (PF) 0.9 % 10 mL injection  40 mg IntraVENous Q12H    rifAXIMin  550 mg Oral BID    hydrALAZINE  10 mg Oral 3 times per day    thiamine  100 mg Oral Daily    atorvastatin  20 mg Oral Nightly    metoprolol succinate  25 mg Oral BID    [Held by provider] spironolactone  25 mg Oral Daily    [Held by provider] bumetanide  2 mg Oral Daily    sodium chloride flush  5-40 mL IntraVENous 2 times per day     Continuous Infusions:   lactated ringers IV soln      heparin (PORCINE) Infusion 16 Units/kg/hr (24 0015)    sodium chloride       PRN Meds:.LORazepam, lidocaine, heparin (porcine), heparin (porcine), sodium chloride flush, sodium chloride, potassium chloride **OR** potassium alternative oral replacement **OR** potassium chloride, magnesium sulfate, polyethylene glycol, acetaminophen **OR** acetaminophen, prochlorperazine 
Department of Internal Medicine  Nephrology Progress Note  Events reviewed.    SUBJECTIVE: We are following Len Dorsey for FRANTZ. Patient reports no complaints.       PHYSICAL EXAM:      Vitals:    VITALS:  BP (!) 94/54   Pulse 66   Temp 97.5 °F (36.4 °C) (Oral)   Resp 16   Ht 1.778 m (5' 10\")   Wt 107.9 kg (237 lb 14 oz)   SpO2 99%   BMI 34.13 kg/m²   24HR BLOOD PRESSURE RANGE:  Systolic (24hrs), Av , Min:91 , Max:129   ; Diastolic (24hrs), Av, Min:54, Max:71  24HR INTAKE/OUTPUT:    Intake/Output Summary (Last 24 hours) at 2024 1009  Last data filed at 2024 0937  Gross per 24 hour   Intake 780 ml   Output --   Net 780 ml       Scheduled Meds:   isosorbide dinitrate  10 mg Oral TID    midodrine  2.5 mg Oral TID WC    empagliflozin  10 mg Oral Daily    busPIRone  5 mg Oral TID    melatonin  3 mg Oral QPM    mirtazapine  7.5 mg Oral Nightly    nicotine  1 patch TransDERmal Daily    lactulose  20 g Oral Daily    PHENobarbital  130 mg IntraVENous Once    warfarin placeholder: dosing by pharmacy   Other RX Placeholder    pantoprazole (PROTONIX) 40 mg in sodium chloride (PF) 0.9 % 10 mL injection  40 mg IntraVENous Q12H    rifAXIMin  550 mg Oral BID    hydrALAZINE  10 mg Oral 3 times per day    thiamine  100 mg Oral Daily    atorvastatin  20 mg Oral Nightly    metoprolol succinate  25 mg Oral BID    [Held by provider] spironolactone  25 mg Oral Daily    [Held by provider] bumetanide  2 mg Oral Daily    sodium chloride flush  5-40 mL IntraVENous 2 times per day     Continuous Infusions:   lactated ringers IV soln      sodium chloride       PRN Meds:.LORazepam, lidocaine, sodium chloride flush, sodium chloride, potassium chloride **OR** potassium alternative oral replacement **OR** potassium chloride, magnesium sulfate, polyethylene glycol, acetaminophen **OR** acetaminophen, prochlorperazine **OR** prochlorperazine     Constitutional:  awake NAD  HEENT:  EOMI  Respiratory:  
Department of Internal Medicine  Nephrology Progress Note  Events reviewed.    SUBJECTIVE: We are following Len Dorsey for FRANTZ. Patient reports no complaints.     \  PHYSICAL EXAM:      Vitals:    VITALS:  BP (!) 158/97   Pulse 90   Temp 97.5 °F (36.4 °C) (Axillary)   Resp 20   Ht 1.778 m (5' 10\")   Wt 109.5 kg (241 lb 6.5 oz)   SpO2 98%   BMI 34.64 kg/m²   24HR BLOOD PRESSURE RANGE:  Systolic (24hrs), Av , Min:104 , Max:158   ; Diastolic (24hrs), Av, Min:60, Max:97  24HR INTAKE/OUTPUT:    Intake/Output Summary (Last 24 hours) at 2024 0920  Last data filed at 2024 0316  Gross per 24 hour   Intake --   Output 1700 ml   Net -1700 ml       Scheduled Meds:   isosorbide dinitrate  10 mg Oral TID    midodrine  2.5 mg Oral TID WC    empagliflozin  10 mg Oral Daily    busPIRone  5 mg Oral TID    melatonin  3 mg Oral QPM    mirtazapine  7.5 mg Oral Nightly    nicotine  1 patch TransDERmal Daily    lactulose  20 g Oral Daily    PHENobarbital  130 mg IntraVENous Once    warfarin placeholder: dosing by pharmacy   Other RX Placeholder    pantoprazole (PROTONIX) 40 mg in sodium chloride (PF) 0.9 % 10 mL injection  40 mg IntraVENous Q12H    rifAXIMin  550 mg Oral BID    hydrALAZINE  10 mg Oral 3 times per day    thiamine  100 mg Oral Daily    atorvastatin  20 mg Oral Nightly    metoprolol succinate  25 mg Oral BID    [Held by provider] spironolactone  25 mg Oral Daily    [Held by provider] bumetanide  2 mg Oral Daily    sodium chloride flush  5-40 mL IntraVENous 2 times per day     Continuous Infusions:   lactated ringers IV soln      heparin (PORCINE) Infusion 10 Units/kg/hr (24 1418)    sodium chloride       PRN Meds:.LORazepam, lidocaine, heparin (porcine), heparin (porcine), sodium chloride flush, sodium chloride, potassium chloride **OR** potassium alternative oral replacement **OR** potassium chloride, magnesium sulfate, polyethylene glycol, acetaminophen **OR** acetaminophen, 
Dr Tolbert notified of consult via [facility]  
IV team placed a IV to LUE, Heparin drip resumed at previous rate. APTT redrawn. Awaiting results to adjust heparin drip.   
Immediately prior to the procedure the patient's History and Physical was reviewed- there are no changes with the current vitals.  BP (!) 98/56   Pulse 75   Temp 97.8 °F (36.6 °C) (Oral)   Resp 18   Ht 1.778 m (5' 10\")   Wt 108.9 kg (240 lb)   SpO2 100%   BMI 34.44 kg/m²     No CP/SOB.  Risks/benefits d/w pt.  All questions answered.  Proceed with EGD.    FER AGUILAR DO  5/9/2024  2:45 PM          
Marissa NP notified that patient's alcohol withdrawls are progressively worse. He is a 24 on CIWA scale. Ativan seems ineffective at controlling symptoms. She will review chart.  
[employer] Pacemaker Bedside Interrogation Report Reviewed:    Battery life 10.7 years.  Mode DDD lower rate 60  Percent of time ventricular pacing 94.9% atrial pacing 0.6%  no pacemaker terminated episodes.  Time in AT/AF less than 0.1 hour/day  Night heart rate over 85 bpm for 7 days    ARNALDO Morejon  [facility]     Electronically signed by ARNALDO Morejon on 5/8/2024 at 3:26 PM    
Notified Dr. Burgos of INR being 2.8.  Orders received.  Stopped heparin drip.  
Notified Dr. Gross of patient's cxray results. Stated to d/c continuous fluids. Orders received.   
Notified Dr. Gross of patient's wet lung sounds w/ crackles in regards to continuous IV fluids running. Orders received.   
PROGRESS NOTE  By Federico Riddle M.D.    [facility]  Dr. Tejinder Subramanian M.D.,  Dr. Roly Pinzon M.D.,   Dr. Osmany Rdz D.O.,  Dr. Lance Lassiter M.D.,  MAYRA HooperO.,          Len Dorsey  58 y.o.  male    SUBJECTIVE:  No new complaints but more somnolent today    OBJECTIVE:    BP (!) 99/58   Pulse 74   Temp 97.9 °F (36.6 °C) (Axillary)   Resp 16   Ht 1.778 m (5' 10\")   Wt 108.9 kg (240 lb)   SpO2 96%   BMI 34.44 kg/m²     General:NAD/adult obese  male  HEENT: Anicteric sclera/moist oral mucosa  Neck: Supple with trachea midline  Chest: Symmetric excursion/nonlabored respirations  Cor: Regular  Abd.: Soft and obese.  Nontender  Extr.:  No significant peripheral edema  Skin: Warm and dry/anicteric        DATA:    Monitor data reviewed -paced rhythm noted.       Lab Results   Component Value Date/Time    WBC 6.2 05/09/2024 09:45 AM    RBC 3.63 05/09/2024 09:45 AM    HGB 9.2 05/10/2024 07:12 AM    HCT 31.2 05/10/2024 07:12 AM    MCV 86.5 05/09/2024 09:45 AM    MCH 25.6 05/09/2024 09:45 AM    MCHC 29.6 05/09/2024 09:45 AM    RDW 17.2 05/09/2024 09:45 AM     05/09/2024 09:45 AM    MPV 10.2 05/09/2024 09:45 AM     Lab Results   Component Value Date/Time     05/10/2024 07:12 AM    K 4.0 05/10/2024 07:12 AM    K 3.9 09/15/2022 03:08 PM    CL 97 05/10/2024 07:12 AM    CO2 27 05/10/2024 07:12 AM    BUN 28 05/10/2024 07:12 AM    CREATININE 2.4 05/10/2024 07:12 AM    CALCIUM 9.1 05/10/2024 07:12 AM    BILITOT 1.5 05/10/2024 07:12 AM    ALKPHOS 157 05/10/2024 07:12 AM    AST 18 05/10/2024 07:12 AM    ALT 8 05/10/2024 07:12 AM     Lab Results   Component Value Date/Time    LIPASE 20 05/07/2024 09:24 AM     No results found for: \"AMYLASE\"      ASSESSMENT/PLAN:  Patient Active Problem List   Diagnosis    Chest pain    Closed fracture of nasal bones    Injury of face    Retroperitoneal hematoma    Vitamin D deficiency    Acute chest pain    Acute cholecystitis    
PROGRESS NOTE  By Federico Riddle M.D.    [facility]  Dr. Tejinder Subramanian M.D.,  Dr. Roly Pinzon M.D.,   Dr. Osmany Rdz D.O.,  Dr. Lance Lassiter M.D.,  MAYRA HooperO.,          Len Sunita  58 y.o.  male    SUBJECTIVE:  No new complaints.  Denies abdominal pain    OBJECTIVE:    /70   Pulse 78   Temp 98.2 °F (36.8 °C) (Oral)   Resp 18   Ht 1.778 m (5' 10\")   Wt 108.7 kg (239 lb 9.6 oz)   SpO2 100%   BMI 34.38 kg/m²     General: NAD/obese  male.  Appears somewhat somnolent   HEENT:anicteric sclera/moist oral mucosa  Neck: Supple with trachea midline  Chest: Symmetric excursion/CTAB  Cor: Regular.  S1-S2  Abd.: Soft and obese.  Not tender  Extr.:  BLE without significant edema  Skin: Warm and dry/anicteric      DATA:    Monitor data reviewed -paced rhythm noted.       Lab Results   Component Value Date/Time    WBC 6.1 05/13/2024 02:59 AM    RBC 3.45 05/13/2024 02:59 AM    HGB 9.1 05/13/2024 02:59 AM    HCT 31.7 05/13/2024 02:59 AM    MCV 91.9 05/13/2024 02:59 AM    MCH 26.4 05/13/2024 02:59 AM    MCHC 28.7 05/13/2024 02:59 AM    RDW 18.6 05/13/2024 02:59 AM    PLT 85 05/13/2024 02:59 AM    MPV 12.3 05/13/2024 02:59 AM     Lab Results   Component Value Date/Time     05/13/2024 02:59 AM    K 4.2 05/13/2024 02:59 AM    K 3.9 09/15/2022 03:08 PM     05/13/2024 02:59 AM    CO2 22 05/13/2024 02:59 AM    BUN 25 05/13/2024 02:59 AM    CREATININE 2.1 05/13/2024 02:59 AM    CALCIUM 8.8 05/13/2024 02:59 AM    BILITOT 0.9 05/13/2024 02:59 AM    ALKPHOS 151 05/13/2024 02:59 AM    AST 17 05/13/2024 02:59 AM    ALT 7 05/13/2024 02:59 AM     Lab Results   Component Value Date/Time    LIPASE 20 05/07/2024 09:24 AM     No results found for: \"AMYLASE\"      ASSESSMENT/PLAN:  Patient Active Problem List   Diagnosis    Chest pain    Closed fracture of nasal bones    Injury of face    Retroperitoneal hematoma    Vitamin D deficiency    Acute chest pain    Acute cholecystitis 
PT/OT therapy in room with patient. Patient cooperative at this time.   
Patient more alert and awake, although still saying things that don't make sense. Ordered pt a lunch tray.  
Patient not appropriate for CHF education at this time.     Analisa ELENAN, RN  Heart Failure Navigator     
Patient pulling at lines and medical equipment. Being vulgar with staff and hitting and kicking staff members. Attempted to verbally redirect and deescalate patient. Unsuccessful. Call placed to Dr. Gross, orders given to place patient in two point soft wrist restraints. Notified Dr. Gross he will have to come and evaluate patient at bedside. Dr. Gross states he is calling Dr. Navarro re: carolyne cook.   
Patient seen and examined prior to procedure.  Pertinent notes/labs reviewed.  Appropriate improvement in INR.  Thrombocytopenia is persistent.  Patient has no new complaints.  Denies any further hematemesis and reports that his hematemesis was very scant.  H&P reviewed -no new changes except as described above.  Vitals:    05/09/24 1208   BP: (!) 98/56   Pulse: 75   Resp: 18   Temp: 97.8 °F (36.6 °C)   SpO2:        Plan: Proceed with upper endoscopy later today.  Please, see orders for plan of care    Federico Riddle MD    
Patient sleeping, awakens easily.States he is willing to try physical therapy if they come back.   
Pharmacy Consultation Note  (Warfarin Dosing and Monitoring)    Initial consult date: 5/10/2024  Consulting Provider: Dr. Rubi Gross    Len Dorsey is a 58 y.o. male for whom pharmacy has been asked to manage warfarin therapy.     Weight:   Wt Readings from Last 1 Encounters:   05/07/24 108.9 kg (240 lb)       TSH:    Lab Results   Component Value Date/Time    TSH 0.95 05/08/2024 02:24 AM       Hepatic Function Panel:                            Lab Results   Component Value Date/Time    ALKPHOS 157 05/10/2024 07:12 AM    ALT 8 05/10/2024 07:12 AM    AST 18 05/10/2024 07:12 AM    BILITOT 1.5 05/10/2024 07:12 AM    BILIDIR 0.2 07/14/2023 05:43 PM    IBILI 0.2 07/14/2023 05:43 PM       Current significant warfarin drug-drug interactions include: barbiturates (decr INR)    Recent Labs     05/08/24  0224 05/08/24  1148 05/09/24  0945 05/09/24  2315 05/10/24  0712   HGB 8.8*   < > 9.3* 9.3* 9.2*   PLT 94*  --  108*  --   --     < > = values in this interval not displayed.     Date Warfarin Dose INR Heparin or LMWH Comment   5/10 2.5 mg 1.4 Heparin                                  Assessment:  Patient is a 58 y.o. male on warfarin for Atrial Fibrillation, Mechanical Heart Valve (mitral), and Mechanical Heart Valve (atrial).  Patient's home warfarin dosing regimen is documented as warfarin 2.5 mg daily.   Goal INR 2.5 - 3.5  INR 1.4 today; patient receive vitamin K 5 mg IV x 1 dose on 5/7 and has had warfarin held since admission    Plan:  Will give warfarin 2.5 mg tonight  Patient currently ordered phenobarbital which may interact with warfarin and decrease INR  Daily PT/INR until the INR is stable within the therapeutic range  Pharmacist will follow and monitor/adjust dosing as necessary    Artie Ram, PharmD, [facility]  5/10/2024  2:40 PM    ZHOU: [phone]  SEY: [phone]  SJW: [phone]  
Pharmacy Consultation Note  (Warfarin Dosing and Monitoring)    Initial consult date: 5/10/2024  Consulting Provider: Dr. Rubi Gross    Len Dorsey is a 58 y.o. male for whom pharmacy has been asked to manage warfarin therapy.     Weight:   Wt Readings from Last 1 Encounters:   05/11/24 107 kg (235 lb 14.3 oz)       TSH:    Lab Results   Component Value Date/Time    TSH 0.95 05/08/2024 02:24 AM       Hepatic Function Panel:                            Lab Results   Component Value Date/Time    ALKPHOS 144 05/11/2024 04:15 AM    ALT 6 05/11/2024 04:15 AM    AST 18 05/11/2024 04:15 AM    BILITOT 1.1 05/11/2024 04:15 AM    BILIDIR 0.2 07/14/2023 05:43 PM    IBILI 0.2 07/14/2023 05:43 PM       Current significant warfarin drug-drug interactions include:   -Phenobarbital, Rifaximin, and trazodone: may diminish the anticoagulatory effect of warfarin.     Recent Labs     05/09/24  0945 05/09/24  2315 05/10/24  0712 05/10/24  1850 05/11/24  0415   HGB 9.3*   < > 9.2* 8.7* 9.5*   *  --   --   --   --     < > = values in this interval not displayed.       Date Warfarin Dose INR Heparin or LMWH Comment   5/10 2.5 mg 1.4 Heparin gtt    5/11 5 mg 1.2 Heparin gtt                           Assessment:  Patient is a 58 y.o. male on warfarin for Atrial Fibrillation, Mechanical Heart Valve (mitral), and Mechanical Heart Valve (atrial).  Patient's home warfarin dosing regimen is documented as warfarin 2.5 mg daily.   Goal INR 2.5 - 3.5  INR 1.2 today; patient receive vitamin K 5 mg IV x 1 dose on 5/7 and has had warfarin held since admission    Plan:  Will order warfarin 5 mg po x 1 tonight, given vitamin K administration on 5/7 and several drug-drug interactions to decrease INR, will order higher dose of warfarin tonight.   Daily PT/INR until the INR is stable within the therapeutic range  Pharmacist will follow and monitor/adjust dosing as necessary    Armaan Antunez RPh  5/11/2024  10:15 AM    SEB: [phone]  SEY: 
Pharmacy Consultation Note  (Warfarin Dosing and Monitoring)    Initial consult date: 5/10/2024  Consulting Provider: Dr. Rubi Gross    Len Dorsey is a 58 y.o. male for whom pharmacy has been asked to manage warfarin therapy.     Weight:   Wt Readings from Last 1 Encounters:   05/12/24 109.8 kg (242 lb 1 oz)       TSH:    Lab Results   Component Value Date/Time    TSH 0.95 05/08/2024 02:24 AM       Hepatic Function Panel:                            Lab Results   Component Value Date/Time    ALKPHOS 154 05/12/2024 04:16 AM    ALT 7 05/12/2024 04:16 AM    AST 19 05/12/2024 04:16 AM    BILITOT 0.9 05/12/2024 04:16 AM    BILIDIR 0.2 07/14/2023 05:43 PM    IBILI 0.2 07/14/2023 05:43 PM       Current significant warfarin drug-drug interactions include:   -Phenobarbital, Rifaximin, and trazodone: may diminish the anticoagulatory effect of warfarin.     Recent Labs     05/09/24  0945 05/09/24  2315 05/10/24  1850 05/11/24  0415 05/12/24  0416   HGB 9.3*   < > 8.7* 9.5* 8.9*   *  --   --   --  98*    < > = values in this interval not displayed.       Date Warfarin Dose INR Heparin or LMWH Comment   5/10 2.5 mg 1.4 Heparin gtt    5/11 5 mg 1.2 Heparin gtt    5/12 5 mg 1.4 Heparin gtt                    Assessment:  Patient is a 58 y.o. male on warfarin for Atrial Fibrillation, Mechanical Heart Valve (mitral), and Mechanical Heart Valve (atrial).  Patient's home warfarin dosing regimen is documented as warfarin 2.5 mg daily.   Goal INR 2.5 - 3.5  INR 1.4 today; patient receive vitamin K 5 mg IV x 1 dose on 5/7 and has had warfarin held since admission    Plan:  Will order warfarin 5 mg po x 1 again tonight, given vitamin K administration on 5/7 and several drug-drug interactions to decrease INR, will order higher dose of warfarin tonight.   Likely to get back to 2.5 mg dosing tomorrow.  Daily PT/INR until the INR is stable within the therapeutic range  Pharmacist will follow and monitor/adjust dosing as 
Pharmacy Consultation Note  (Warfarin Dosing and Monitoring)    Initial consult date: 5/10/2024  Consulting Provider: Dr. Rubi Gross    Len Dorsey is a 58 y.o. male for whom pharmacy has been asked to manage warfarin therapy.     Weight:   Wt Readings from Last 1 Encounters:   05/13/24 108.7 kg (239 lb 9.6 oz)       TSH:    Lab Results   Component Value Date/Time    TSH 0.95 05/08/2024 02:24 AM       Hepatic Function Panel:                            Lab Results   Component Value Date/Time    ALKPHOS 151 05/13/2024 02:59 AM    ALT 7 05/13/2024 02:59 AM    AST 17 05/13/2024 02:59 AM    BILITOT 0.9 05/13/2024 02:59 AM    BILIDIR 0.2 07/14/2023 05:43 PM    IBILI 0.2 07/14/2023 05:43 PM       Current significant warfarin drug-drug interactions include:   -Phenobarbital, Rifaximin, and trazodone: may diminish the anticoagulatory effect of warfarin.     Recent Labs     05/11/24  0415 05/12/24  0416 05/13/24  0259   HGB 9.5* 8.9* 9.1*   PLT  --  98* 85*       Date Warfarin Dose INR Heparin or LMWH Comment   5/10 1.4 Heparin infusion    5/11 5 mg 1.2 Heparin infusion    5/12 5 mg 1.4 Heparin infusion    5/13 4 mg 1.7 Heparin infusion             Assessment:  Patient is a 58 y.o. male on warfarin for Atrial Fibrillation, Mechanical Heart Valve (mitral), and Mechanical Heart Valve (atrial).  Patient's home warfarin dosing regimen is documented as warfarin 2.5 mg daily.   Goal INR 2.5 - 3.5  INR 1.4 today; patient receive vitamin K 5 mg IV x 1 dose on 5/7 and had warfarin held from 5/7-5/10    Plan:  Will give warfarin 4 mg x 1 tonight as INR increasing  Daily PT/INR until the INR is stable within the therapeutic range  Pharmacist will follow and monitor/adjust dosing as necessary    Artie Ram, AngélicaD, BCCCP  5/13/2024  12:51 PM    SEB: [phone]  SEY: [phone]  SJW: [phone]  
Pharmacy Consultation Note  (Warfarin Dosing and Monitoring)    Initial consult date: 5/10/2024  Consulting Provider: Dr. Rubi Gross    Len Dorsey is a 58 y.o. male for whom pharmacy has been asked to manage warfarin therapy.     Weight:   Wt Readings from Last 1 Encounters:   05/14/24 108.1 kg (238 lb 6.4 oz)       TSH:    Lab Results   Component Value Date/Time    TSH 0.95 05/08/2024 02:24 AM       Hepatic Function Panel:                            Lab Results   Component Value Date/Time    ALKPHOS 159 05/14/2024 05:42 AM    ALT 8 05/14/2024 05:42 AM    AST 17 05/14/2024 05:42 AM    BILITOT 1.2 05/14/2024 05:42 AM    BILIDIR 0.2 07/14/2023 05:43 PM    IBILI 0.2 07/14/2023 05:43 PM       Current significant warfarin drug-drug interactions include:   -Phenobarbital, Rifaximin, and trazodone: may diminish the anticoagulatory effect of warfarin.     Recent Labs     05/12/24  0416 05/13/24  0259 05/14/24  0542   HGB 8.9* 9.1* 9.0*   PLT 98* 85* 90*       Date Warfarin Dose INR Heparin or LMWH Comment   5/10 1.4 Heparin infusion    5/11 5 mg 1.2 Heparin infusion    5/12 5 mg 1.4 Heparin infusion    5/13 4 mg 1.7 Heparin infusion    5/14 5 mg 1.9 Heparin infusion                           Assessment:  Patient is a 58 y.o. male on warfarin for Atrial Fibrillation, Mechanical Heart Valve (mitral), and Mechanical Heart Valve (atrial).  Patient's home warfarin dosing regimen is documented as warfarin 2.5 mg daily.   Goal INR 2.5 - 3.5  INR 1.9 today; patient received vitamin K 5 mg IV x 1 dose on 5/7 and had warfarin held from 5/7-5/10    Plan:  Will give warfarin 5 mg x 1 tonight  Daily PT/INR until the INR is stable within the therapeutic range  Pharmacist will follow and monitor/adjust dosing as necessary    Artie Ram, PharmD, BCCCP  5/14/2024  1:18 PM    ZHOU: [phone]  SEY: [phone]  SJW: [phone]  
Pharmacy Consultation Note  (Warfarin Dosing and Monitoring)    Initial consult date: 5/10/2024  Consulting Provider: Dr. Rubi Gross    Len Dorsey is a 58 y.o. male for whom pharmacy has been asked to manage warfarin therapy.     Weight:   Wt Readings from Last 1 Encounters:   05/15/24 108.1 kg (238 lb 6.4 oz)       TSH:    Lab Results   Component Value Date/Time    TSH 0.95 05/08/2024 02:24 AM       Hepatic Function Panel:                          Lab Results   Component Value Date/Time    ALKPHOS 149 05/15/2024 03:57 AM    ALT 7 05/15/2024 03:57 AM    AST 17 05/15/2024 03:57 AM    BILITOT 1.0 05/15/2024 03:57 AM    BILIDIR 0.2 07/14/2023 05:43 PM    IBILI 0.2 07/14/2023 05:43 PM       Current significant warfarin drug-drug interactions include:   -Phenobarbital, Rifaximin, and trazodone: may diminish the anticoagulatory effect of warfarin.     Recent Labs     05/13/24  0259 05/14/24  0542 05/15/24  0357   HGB 9.1* 9.0* 9.0*   PLT 85* 90*  --        Date Warfarin Dose INR Heparin or LMWH Comment   5/10 1.4 Heparin infusion    5/11 5 mg 1.2 Heparin infusion    5/12 5 mg 1.4 Heparin infusion    5/13 4 mg 1.7 Heparin infusion    5/14 5 mg 1.9 Heparin infusion    5/15 5 mg 2 Heparin infusion                    Assessment:  Patient is a 58 y.o. male on warfarin for Atrial Fibrillation, Mechanical Heart Valve (mitral), and Mechanical Heart Valve (atrial).  Patient's home warfarin dosing regimen is documented as warfarin 2.5 mg daily.   Goal INR 2.5 - 3.5  INR 2 today; patient received vitamin K 5 mg IV x 1 dose on 5/7 and had warfarin held from 5/7-5/10    Plan:  Will give warfarin 5 mg x 1 again tonight  Daily PT/INR until the INR is stable within the therapeutic range  Pharmacist will follow and monitor/adjust dosing as necessary    Artie Ram, PharmD, BCCCP  5/15/2024  11:24 AM    SEB: [phone]  SEY: [phone]  SJW: [phone]  
Pharmacy Consultation Note  (Warfarin Dosing and Monitoring)    Initial consult date: 5/10/2024  Consulting Provider: Dr. Rubi Gross    Len Dorsey is a 58 y.o. male for whom pharmacy has been asked to manage warfarin therapy.     Weight:   Wt Readings from Last 1 Encounters:   05/17/24 106.6 kg (235 lb 0.2 oz)       TSH:    Lab Results   Component Value Date/Time    TSH 0.95 05/08/2024 02:24 AM       Hepatic Function Panel:                          Lab Results   Component Value Date/Time    ALKPHOS 163 05/17/2024 05:12 AM    ALT 9 05/17/2024 05:12 AM    AST 24 05/17/2024 05:12 AM    BILITOT 1.3 05/17/2024 05:12 AM    BILIDIR 0.2 07/14/2023 05:43 PM    IBILI 0.2 07/14/2023 05:43 PM       Current significant warfarin drug-drug interactions include:   -Phenobarbital, Rifaximin, and trazodone: may diminish the anticoagulatory effect of warfarin.     Recent Labs     05/15/24  0357 05/16/24  1255   HGB 9.0* 10.3*   PLT  --  127*       Date Warfarin Dose INR Heparin or LMWH Comment   5/10 1.4 Heparin infusion    5/11 5 mg 1.2 Heparin infusion    5/12 5 mg 1.4 Heparin infusion    5/13 4 mg 1.7 Heparin infusion    5/14 5 mg 1.9 Heparin infusion    5/15 REFUSED per MAR 2 Heparin infusion    5/16 6 mg 2.1 Heparin infusion    5/17 6 mg 2.2 Heparin infusion                           Assessment:  Patient is a 58 y.o. male on warfarin for Atrial Fibrillation, Mechanical Heart Valve (mitral), and Mechanical Heart Valve (atrial).  Patient's home warfarin dosing regimen is documented as warfarin 2.5 mg daily.   Goal INR 2.5 - 3.5  INR 2.2 today; patient received vitamin K 5 mg IV x 1 dose on 5/7 and had warfarin held from 5/7-5/10  Per MAR, patient/family refused warfarin dose on 5/15    Plan:  Will give warfarin 6 mg tonight  Daily PT/INR until the INR is stable within the therapeutic range  Pharmacist will follow and monitor/adjust dosing as necessary    Artie Ram, PharmD, BCCCP  5/17/2024  11:05 AM    SEB: 
Pharmacy Consultation Note  (Warfarin Dosing and Monitoring)    Initial consult date: 5/10/2024  Consulting Provider: Dr. Rubi Gross    Len Dorsey is a 58 y.o. male for whom pharmacy has been asked to manage warfarin therapy.     Weight:   Wt Readings from Last 1 Encounters:   05/18/24 107.9 kg (237 lb 14 oz)       TSH:    Lab Results   Component Value Date/Time    TSH 0.95 05/08/2024 02:24 AM       Hepatic Function Panel:                          Lab Results   Component Value Date/Time    ALKPHOS 128 05/18/2024 11:00 AM    ALT 8 05/18/2024 11:00 AM    AST 16 05/18/2024 11:00 AM    BILITOT 0.7 05/18/2024 11:00 AM    BILIDIR 0.2 07/14/2023 05:43 PM    IBILI 0.2 07/14/2023 05:43 PM       Current significant warfarin drug-drug interactions include:   -Rifaximin may diminish the anticoagulatory effect of warfarin.     Recent Labs     05/16/24  1255   HGB 10.3*   *       Date Warfarin Dose INR Heparin or LMWH Comment   5/10 1.4 Heparin infusion    5/11 5 mg 1.2 Heparin infusion    5/12 5 mg 1.4 Heparin infusion    5/13 4 mg 1.7 Heparin infusion    5/14 5 mg 1.9 Heparin infusion    5/15 REFUSED per MAR 2 Heparin infusion    5/16 6 mg 2.1 Heparin infusion    5/17 6 mg 2.2 Heparin infusion    5/18 Hold 3.6 --    5/19 3 mg 3.5 --             Assessment:  Patient is a 58 y.o. male on warfarin for Atrial Fibrillation, Mechanical Heart Valve (mitral), and Mechanical Heart Valve (atrial).  Patient's home warfarin dosing regimen is documented as warfarin 2.5 mg daily.   Goal INR 2.5 - 3.5  INR 3.5 today; patient received vitamin K 5 mg IV x 1 dose on 5/7 and had warfarin held from 5/7-5/10  Per MAR, patient/family refused warfarin dose on 5/15    Plan:  Warfarin 3 mg po x 1 tonight  Daily PT/INR until the INR is stable within the therapeutic range  Pharmacist will follow and monitor/adjust dosing as necessary    Merari Van, PharmD, BCPS, BCCCP 5/19/2024 8:26 AM    
Pharmacy Consultation Note  (Warfarin Dosing and Monitoring)    Initial consult date: 5/10/2024  Consulting Provider: Dr. Rubi Gross    Len Dorsey is a 58 y.o. male for whom pharmacy has been asked to manage warfarin therapy.     Weight:   Wt Readings from Last 1 Encounters:   05/18/24 107.9 kg (237 lb 14 oz)       TSH:    Lab Results   Component Value Date/Time    TSH 0.95 05/08/2024 02:24 AM       Hepatic Function Panel:                          Lab Results   Component Value Date/Time    ALKPHOS 163 05/17/2024 05:12 AM    ALT 9 05/17/2024 05:12 AM    AST 24 05/17/2024 05:12 AM    BILITOT 1.3 05/17/2024 05:12 AM    BILIDIR 0.2 07/14/2023 05:43 PM    IBILI 0.2 07/14/2023 05:43 PM       Current significant warfarin drug-drug interactions include:   -Rifaximin may diminish the anticoagulatory effect of warfarin.     Recent Labs     05/16/24  1255   HGB 10.3*   *       Date Warfarin Dose INR Heparin or LMWH Comment   5/10 1.4 Heparin infusion    5/11 5 mg 1.2 Heparin infusion    5/12 5 mg 1.4 Heparin infusion    5/13 4 mg 1.7 Heparin infusion    5/14 5 mg 1.9 Heparin infusion    5/15 REFUSED per MAR 2 Heparin infusion    5/16 6 mg 2.1 Heparin infusion    5/17 6 mg 2.2 Heparin infusion    5/18 Hold 3.6 --                    Assessment:  Patient is a 58 y.o. male on warfarin for Atrial Fibrillation, Mechanical Heart Valve (mitral), and Mechanical Heart Valve (atrial).  Patient's home warfarin dosing regimen is documented as warfarin 2.5 mg daily.   Goal INR 2.5 - 3.5  INR 3.6 today; patient received vitamin K 5 mg IV x 1 dose on 5/7 and had warfarin held from 5/7-5/10  Per MAR, patient/family refused warfarin dose on 5/15    Plan:  Hold warfarin dose today d/t large increase in INR   Daily PT/INR until the INR is stable within the therapeutic range  Pharmacist will follow and monitor/adjust dosing as necessary    Merari Van, Suraj, BCPS, BCCCP 5/18/2024 8:54 AM    
Physical Therapy    Pt on hold today per Nurse, states not well medically. Will follow on another date/time.  Lamont Menon PTA [id number]    
Physical Therapy  Facility/Department: [address]  Physical Therapy Treatment Note    Name: Len Dorsey  : 1965  MRN: [id number]  Date of Service: 5/15/2024               Patient Diagnosis(es): The primary encounter diagnosis was Chest pain, unspecified type. Diagnoses of Supratherapeutic INR, Alcohol withdrawal syndrome without complication (HCC), VHD (valvular heart disease), and Anemia, unspecified type were also pertinent to this visit.  Past Medical History:  has a past medical history of Alcoholic liver disease (HCC), H/O prosthetic aortic valve replacement, History of mitral valve replacement, Stroke (HCC), and Testicular cancer (HCC).  Past Surgical History:  has a past surgical history that includes fracture surgery; pacemaker placement (2020); Upper gastrointestinal endoscopy (N/A, 2022); Colonoscopy (N/A, 2022); and Upper gastrointestinal endoscopy (N/A, 2024).               Evaluating Therapist: Trina Joseph, PT     Rec ww     Referring Provider:      Thao Lebron MD       PT order : PT eval and treat     Room #: 434   DIAGNOSIS: The primary encounter diagnosis was Chest pain, unspecified type. Diagnoses of Supratherapeutic INR, Alcohol withdrawal syndrome without complication (HCC), and VHD (valvular heart disease) were also pertinent to this visit.  Additional Pertinent History: ETOH   PRECAUTIONS: falls, seizure     Social:  Pt lives with fiance  in a  2  floor plan  with bed and bath upstairs, but pt reports he can stay on the first floor    Prior to admission pt walked with  no AD, independent per pt . Reports falls when he drinks      Initial Evaluation  Date:  2024  Treatment  5/15/2024   Short Term/ Long Term   Goals   Was pt agreeable to Eval/treatment?  Yes  yes    Does pt have pain?  Reports hunger and thirst from NPO, upset about this  No complaints    Bed Mobility  Rolling:  SBA   Supine to sit:  min assist   Sit to supine:  NT   Scooting:  SBA 
Physical Therapy  Facility/Department: [address] INTERMEDIATE  Physical Therapy Initial Assessment    Name: Len Dorsey  : 1965  MRN: [id number]  Date of Service: 2024               Patient Diagnosis(es): The primary encounter diagnosis was Chest pain, unspecified type. Diagnoses of Supratherapeutic INR, Alcohol withdrawal syndrome without complication (HCC), and VHD (valvular heart disease) were also pertinent to this visit.  Past Medical History:  has a past medical history of Alcoholic liver disease (HCC), H/O prosthetic aortic valve replacement, History of mitral valve replacement, Stroke (HCC), and Testicular cancer (HCC).  Past Surgical History:  has a past surgical history that includes fracture surgery; pacemaker placement (2020); Upper gastrointestinal endoscopy (N/A, 2022); and Colonoscopy (N/A, 2022).         Requires PT Follow-Up: Yes     Evaluating Therapist: Trina Joseph PT     Rec ww     Referring Provider:      Thao Lebron MD       PT order : PT eval and treat     Room #: 644   DIAGNOSIS: The primary encounter diagnosis was Chest pain, unspecified type. Diagnoses of Supratherapeutic INR, Alcohol withdrawal syndrome without complication (HCC), and VHD (valvular heart disease) were also pertinent to this visit.  Additional Pertinent History: ETOH   PRECAUTIONS: falls, seizure     Social:  Pt lives with fiance  in a  2  floor plan  with bed and bath upstairs, but pt reports he can stay on the first floor    Prior to admission pt walked with  no AD, independent per pt . Reports falls when he drinks      Initial Evaluation  Date:  2024  Treatment      Short Term/ Long Term   Goals   Was pt agreeable to Eval/treatment?  Yes      Does pt have pain?  Reports hunger and thirst from NPO, upset about this      Bed Mobility  Rolling:  SBA   Supine to sit:  min assist   Sit to supine:  NT   Scooting:  SBA in sit   S/I   Transfers Sit to stand:  CGA   Stand to sit: CGA   Stand 
Physical Therapy  Facility/Department: [address]  Physical Therapy Treatment Note    Name: Len Dorsey  : 1965  MRN: [id number]  Date of Service: 2024               Patient Diagnosis(es): The primary encounter diagnosis was Chest pain, unspecified type. Diagnoses of Supratherapeutic INR, Alcohol withdrawal syndrome without complication (HCC), VHD (valvular heart disease), and Anemia, unspecified type were also pertinent to this visit.  Past Medical History:  has a past medical history of Alcoholic liver disease (HCC), H/O prosthetic aortic valve replacement, History of mitral valve replacement, Stroke (HCC), and Testicular cancer (HCC).  Past Surgical History:  has a past surgical history that includes fracture surgery; pacemaker placement (2020); Upper gastrointestinal endoscopy (N/A, 2022); Colonoscopy (N/A, 2022); and Upper gastrointestinal endoscopy (N/A, 2024).               Evaluating Therapist: Trina Joseph, PT     Rec ww     Referring Provider:      Thao Lebron MD       PT order : PT eval and treat     Room #: 434   DIAGNOSIS: The primary encounter diagnosis was Chest pain, unspecified type. Diagnoses of Supratherapeutic INR, Alcohol withdrawal syndrome without complication (HCC), and VHD (valvular heart disease) were also pertinent to this visit.  Additional Pertinent History: ETOH   PRECAUTIONS: falls, seizure     Social:  Pt lives with fiance  in a  2  floor plan  with bed and bath upstairs, but pt reports he can stay on the first floor    Prior to admission pt walked with  no AD, independent per pt . Reports falls when he drinks      Initial Evaluation  Date:  2024  Treatment  2024   Short Term/ Long Term   Goals   Was pt agreeable to Eval/treatment?  Yes  yes    Does pt have pain?  Reports hunger and thirst from NPO, upset about this  No complaints    Bed Mobility  Rolling:  SBA   Supine to sit:  min assist   Sit to supine:  NT   Scooting:  SBA 
Psych notified of new consult. No new orders received.  
Pt assessed. Awakes to voice but only keeps eyes open briefly. Still confused. Reoriented patient, follows verbal commands. Falls asleep intermittently. Unable to give PO medications at this time. Sitter at bedside. Will monitor closely.   
Pt domestic partner/fiance, Leonie Moreland, gave verbal consent via telephone to two RN's for EGD/blood transfusion tomorrow.  
Pt unable to void since straight cath at 0500. Attempted to stand patient at bedside to void, which was unsuccessful. Bladder scan showed 556cc in bladder. Straight cath attempted per protocol, which was unsuccessful. Attempted to place marks catheter, which was also unsuccessful. Patient attempted to try to void by himself again and could not. Coude catheter placed with success and got immediate return of >500cc of valarie urine. Marks patent and draining.   
SPEECH LANGUAGE PATHOLOGY  DAILY PROGRESS NOTE      PATIENT NAME:  Len Dorsey      :  1965          TODAY'S DATE:  2024 ROOM:  [facility]    Current Diet: ADULT DIET; Dysphagia - Soft and Bite Sized; Mildly Thick ([place])    Patient seen for additional dysphagia tx during lunch meal. Patient seated upright in bed and feeding self with some assistance. Patient with mildly prolonged mastication and mild oral residuals after bites of soft and bite sized solids. Patient with eventual clearance of residuals with liquid wash. Patient with appropriate rate of intake but benefited from v/c to take small bites.    Recommendation: cont current diet with assistance during meals to ensure small bites and assist with feeding when needed.       CPT code(s) 76018  dysphagia tx  Total minutes :  15 minutes    Elham Barron M.S. CCC-SLP/L  Speech Language Pathologist  [id number]     
SPEECH LANGUAGE PATHOLOGY  DAILY PROGRESS NOTE      PATIENT NAME:  Len Dorsey      :  1965          TODAY'S DATE:  2024 ROOM:  [facility]    Current Diet: ADULT DIET; Dysphagia - Soft and Bite Sized; Mildly Thick ([place])  ADULT ORAL NUTRITION SUPPLEMENT; Lunch, Dinner; Frozen Oral Supplement    Patient seen for ongoing dysphagia tx. Patient appeared agitated, but was cooperative with trials of solids and thin liquids this date. Patient tolerated bites of hard solid with mild oral residue. However, patient required v/c to decrease bite size and rate. Patient tolerated sips of thin liquid by both cup and straw this date. No overt s/s of aspiration or penetration noted. SLP recommend upgrade to soft and bite size with thin liquids. Discussed with patient's RN at bedside.     Recommendation: upgrade diet to soft and bite size with thin liquids       CPT code(s) 03752  dysphagia tx  Total minutes :  20 minutes    Elham Barron M.S. CCC-SLP/L  Speech Language Pathologist  [id number]     
SPIRITUAL HEALTH SERVICES - [facility] Man Encounter    Name: Len Dorsey                  Referral: Routine Visit    Sacraments  Anointed (Last Rites): Yes  Apostolic [place]: No  Confession: No  Communion: No     Assessment:  Patient receptive to  visit.      Intervention:   provided spiritual support and sacramental ministry for patient.     Outcome:  Patient expressed gratitude for visit.    Plan:  Chaplains will remain available to offer spiritual and emotional support as needed.      Electronically signed by Chaplain Bar, on 5/13/2024 at 2:35 PM.  Spiritual Care Department  [facility]  [phone]   
Spoke to patient's partner Leonie Moreland and updated on application of restraints for patient and staff safety, as well as possible move to ICU for further medical management of withdrawals.   
This nurse called report to nurse on 4S, all questions answered. Will be calling domestic partner of transfer.   
VM left for Leonie regarding new room number  
While rounding this  found the patient to be asleep. Silent prayer was offered for the patient and a prayer card was left in the room,  remains available for support.  
[phone]  
Daily Nutrient Needs:  Energy Requirements Based On: Formula ([place] [address] jeor)  Weight Used for Energy Requirements: Current  Energy (kcal/day): 4321-6261  Weight Used for Protein Requirements: Ideal  Protein (g/day): 80-90 (1.1-1.3 g/kg)  Method Used for Fluid Requirements: Other (Comment)  Fluid (ml/day): per Critical Care management    Nutrition Diagnosis:   Inadequate oral intake related to cognitive or neurological impairment as evidenced by intake 26-50%, intake 0-25%, poor intake prior to admission    Nutrition Interventions:   Food and/or Nutrient Delivery: Continue Current Diet, Start Oral Nutrition Supplement  Nutrition Education/Counseling: No recommendation at this time  Coordination of Nutrition Care: Continue to monitor while inpatient       Goals:  Previous Goal Met: Progressing toward Goal(s)  Goals: PO intake 50% or greater, by next RD assessment       Nutrition Monitoring and Evaluation:   Behavioral-Environmental Outcomes: None Identified  Food/Nutrient Intake Outcomes: Food and Nutrient Intake, Supplement Intake  Physical Signs/Symptoms Outcomes: Biochemical Data, GI Status, Fluid Status or Edema, Nutrition Focused Physical Findings, Skin, Weight    Discharge Planning:    Continue Oral Nutrition Supplement, Continue current diet     Wilma Conway RD, CNSC, LD  Contact: x [phone]    
environmental modifications for increased safety with functional transfers/mobility and ADLs  * Cognitive retraining/development of therapeutic activities to improve problem solving, judgement, memory, and attention for increased safety/participation in ADL/IADL tasks  * Therapeutic exercise to improve motor endurance, ROM, and functional strength for ADLs/functional transfers  * Therapeutic activities to facilitate/challenge dynamic balance, stand tolerance for increased safety and independence with ADLs  * Therapeutic activities to facilitate gross/fine motor skills for increased independence with ADLs  * Positioning to improve skin integrity, interaction with environment and functional independence  * Delirium prevention/treatment    Recommended Adaptive Equipment: TBD      Home Living: Lives with fiyonis, single family home, 2 story. Bedroom and bathroom on on 2nd floor but available to stay on 1st floor.         Equipment owned: None     Prior Level of Function: Independent with ADLs , fiance assists with IADLs. Ambulated independently.     Pain Level: No c/o pain      Cognition: A&O: 4/4; Follows 3 step directions   Memory: fair   Sequencing: fair   Problem solving: fair    Judgement/safety: fair -     Functional Assessment: AM-PAC Daily Activity Raw Score: 15/24   Initial Eval Status  Date: 5/17/24   Treatment Status  Date:  STGs = LTGs  Time frame: 10-14 days   Feeding Supervision     Independent    Grooming Stand by Assist   Set-up    Supervision    UB Dressing Minimal Assist    Supervision    LB Dressing Moderate Assist    Supervision    Bathing Moderate Assist     Supervision    Toileting Moderate Assist     Supervision    Bed Mobility  Supine to sit: Stand by Assist   Sit to supine:  N/A    Supine to sit: Independent   Sit to supine: Independent    Functional Transfers Sit to stand: Minimal Assist   Stand to sit: Minimal Assist     Supervision    Functional Mobility Minimal Assist without AD initially 
flush, sodium chloride, potassium chloride **OR** potassium alternative oral replacement **OR** potassium chloride, magnesium sulfate, polyethylene glycol, acetaminophen **OR** acetaminophen, prochlorperazine **OR** prochlorperazine     Constitutional:  awake NAD  HEENT:  EOMI  Respiratory:  CTA  Cardiovascular/Edema:  S1 S2  Gastrointestinal:  +BS  Neurologic:  no deficits  Skin:  warm dry  Other:  no edema     DATA:    CBC with Differential:    Lab Results   Component Value Date/Time    WBC 5.3 05/15/2024 03:57 AM    RBC 3.49 05/15/2024 03:57 AM    HGB 9.0 05/15/2024 03:57 AM    HCT 30.8 05/15/2024 03:57 AM    PLT 90 05/14/2024 05:42 AM    MCV 88.3 05/15/2024 03:57 AM    MCH 25.8 05/15/2024 03:57 AM    MCHC 29.2 05/15/2024 03:57 AM    RDW 18.6 05/15/2024 03:57 AM    NRBC 1 02/28/2024 03:59 AM    METASPCT DUPLICATE 07/18/2023 04:12 AM    LYMPHOPCT 13 05/15/2024 03:57 AM    PROMYELOPCT DUPLICATE 07/18/2023 04:12 AM    MONOPCT 14 05/15/2024 03:57 AM    MYELOPCT DUPLICATE 07/18/2023 04:12 AM    EOSPCT 3 05/15/2024 03:57 AM    BASOPCT 1 05/15/2024 03:57 AM    MONOSABS 0.77 05/15/2024 03:57 AM    LYMPHSABS 0.72 07/18/2023 04:30 PM    EOSABS 0.16 05/15/2024 03:57 AM    BASOSABS 0.03 05/15/2024 03:57 AM     CMP:    Lab Results   Component Value Date/Time     05/15/2024 03:57 AM    K 4.4 05/15/2024 03:57 AM    K 3.9 09/15/2022 03:08 PM     05/15/2024 03:57 AM    CO2 24 05/15/2024 03:57 AM    BUN 17 05/15/2024 03:57 AM    CREATININE 1.7 05/15/2024 03:57 AM    GFRAA >60 09/15/2022 03:08 PM    LABGLOM 46 05/15/2024 03:57 AM    LABGLOM 31 03/05/2024 05:57 AM    GLUCOSE 101 05/15/2024 03:57 AM    CALCIUM 9.3 05/15/2024 03:57 AM    BILITOT 1.0 05/15/2024 03:57 AM    ALKPHOS 149 05/15/2024 03:57 AM    AST 17 05/15/2024 03:57 AM    ALT 7 05/15/2024 03:57 AM     Magnesium:    Lab Results   Component Value Date/Time    MG 1.8 05/15/2024 03:57 AM     Phosphorus:    Lab Results   Component Value Date/Time    PHOS 4.2 
  RDW 17.7*  --  17.3*  --  17.2*  --   --    PLT 94*  --   --   --  108*  --   --    MPV 11.3  --  11.9  --  10.2  --   --     < > = values in this interval not displayed.       Radiology:     Assessment:    Principal Problem:    Chest pain  Active Problems:    Hypertension    Hx of CABG    Warfarin anticoagulation    Subtherapeutic international normalized ratio (INR)    Alcohol withdrawal syndrome without complication (HCC)    Alcoholic liver disease, unspecified (HCC)    Thrombocytopenia (HCC)  Resolved Problems:    * No resolved hospital problems. *      Principal Problem:    Chest pain  Active Problems:    Hypertension    Hx of CABG    Warfarin anticoagulation    Subtherapeutic international normalized ratio (INR)    Alcohol withdrawal syndrome without complication (HCC)    Alcoholic liver disease, unspecified (HCC)    Thrombocytopenia (HCC)  Resolved Problems:    * No resolved hospital problems. *        # Chest pain with troponin leak  # History of CAD, CABG LIMA to LAD wall with placement September 2020  Cardiology consulted, due to the mechanical fall will start heparin infusion as INR is less than 2.5 today  Continue beta-blocker and isosorbide dinitrate        # Alcohol abuse and withdrawal, continue CIWA protocol.   High CIWA score, discussed with ICU, this patient will need Precedex drip.          # Chronic Coumadin use in setting of mechanical mitral valve  INR target 2.5-3.5  Start bridging process with heparin infusion and Coumadin pharmacy to dose Coumadin  Schedule Valium.   Phenobarb tapering.           # GI bleed with reported questionable hematemesis  Continue Protonix with octreotide  Octreotide discontinued yesterday after EGD  EGD reports noted portal hypertension gastropathy normal esophagus no varices noted gastritis and duodenitis.  Will continue Protonix for 8 weeks.  Outpatient colonoscopy        # Cirrhosis,  Right upper quadrant ultrasound shows poor visualization of liver left lobe 
pain with troponin leak  # History of CAD, CABG LIMA to LAD wall with placement September 2020  Cardiology consulted, due to the mechanical fall will start heparin infusion as INR is less than 2.5 today  Continue beta-blocker and isosorbide dinitrate        # Alcohol abuse and withdrawal, continue CIWA protocol.   Continue phenobarb taper with Precedex infusion  S/p precedex, cont phenonbarb.            # Chronic Coumadin use in setting of mechanical mitral valve  INR target 2.5-3.5  Start bridging process with heparin infusion and Coumadin pharmacy to dose Coumadin                 # GI bleed with reported questionable hematemesis  Continue Protonix with octreotide  Octreotide discontinued yesterday after EGD  EGD reports noted portal hypertension gastropathy normal esophagus no varices noted gastritis and duodenitis.  Will continue Protonix for 8 weeks.  Outpatient colonoscopy        # Cirrhosis,  Right upper quadrant ultrasound shows poor visualization of liver left lobe poorly visualized due to body habitus mildly heterogeneous liver echogenicity  No evidence of abdominal ascites        # Hepatic encephalopathy, continue lactulose with rifaximin.         # FRANTZ on CKD stage III, stable. Cont to monitor. S/p IVFs.       NOTE: This report was transcribed using voice recognition software. Every effort was made to ensure accuracy; however, inadvertent computerized transcription errors may be present.  Electronically signed by Rubi Gross DO on 5/12/2024 at 2:47 PM    
GI following   CAD status post CABG,   S/p AVR and MVR mechanical valves, on warfarin  History of testicular cancer  Hyperlipidemia, on atorvastatin  History of CVA     Plan:     Continue folic acid 1 mg  Increase midodrine to 5 mg 3 times daily  Continue hydralazine 10 mg 3 times daily, metoprolol succinate 25 mg twice daily  Continue to hold spironolactone  Continue to hold bumex  Continue to monitor renal function  Continue to monitor blood pressure  Okay to discharge from renal point of view, follow up in 2-3 weeks, BMP one week prior       Electronically signed by ARNALDO Evans - CNP on 5/19/2024 at 9:30 AM     I saw and evaluated the patient, performing the key elements of the service.  I discussed the findings, assessment and plan with NP and agree with her findings and plan as annotated and documented in her note.     Omar Joiner MD         
ml         Constitutional:  awake NAD  HEENT:  EOMI  Respiratory:  CTA  Cardiovascular/Edema:  S1 S2  Gastrointestinal:  +BS  Neurologic:  no deficits  Skin:  warm dry  Other:  no edema     DATA:    CBC with Differential:    Lab Results   Component Value Date/Time    WBC 5.4 05/14/2024 05:42 AM    RBC 3.45 05/14/2024 05:42 AM    HGB 9.0 05/14/2024 05:42 AM    HCT 30.5 05/14/2024 05:42 AM    PLT 90 05/14/2024 05:42 AM    MCV 88.4 05/14/2024 05:42 AM    MCH 26.1 05/14/2024 05:42 AM    MCHC 29.5 05/14/2024 05:42 AM    RDW 18.4 05/14/2024 05:42 AM    NRBC 1 02/28/2024 03:59 AM    METASPCT DUPLICATE 07/18/2023 04:12 AM    LYMPHOPCT 15 05/14/2024 05:42 AM    PROMYELOPCT DUPLICATE 07/18/2023 04:12 AM    MONOPCT 14 05/14/2024 05:42 AM    MYELOPCT DUPLICATE 07/18/2023 04:12 AM    EOSPCT 3 05/14/2024 05:42 AM    BASOPCT 1 05/14/2024 05:42 AM    MONOSABS 0.76 05/14/2024 05:42 AM    LYMPHSABS 0.72 07/18/2023 04:30 PM    EOSABS 0.14 05/14/2024 05:42 AM    BASOSABS 0.03 05/14/2024 05:42 AM     CMP:    Lab Results   Component Value Date/Time     05/14/2024 05:42 AM    K 4.1 05/14/2024 05:42 AM    K 3.9 09/15/2022 03:08 PM     05/14/2024 05:42 AM    CO2 24 05/14/2024 05:42 AM    BUN 19 05/14/2024 05:42 AM    CREATININE 1.8 05/14/2024 05:42 AM    GFRAA >60 09/15/2022 03:08 PM    LABGLOM 45 05/14/2024 05:42 AM    LABGLOM 31 03/05/2024 05:57 AM    GLUCOSE 86 05/14/2024 05:42 AM    CALCIUM 9.2 05/14/2024 05:42 AM    BILITOT 1.2 05/14/2024 05:42 AM    ALKPHOS 159 05/14/2024 05:42 AM    AST 17 05/14/2024 05:42 AM    ALT 8 05/14/2024 05:42 AM     Magnesium:    Lab Results   Component Value Date/Time    MG 1.7 05/14/2024 05:42 AM     Phosphorus:    Lab Results   Component Value Date/Time    PHOS 3.3 05/14/2024 05:42 AM     Radiology Review:      CXR 5/12/24     FINDINGS:  The heart is enlarged.  Postop sternotomy changes are noted.     There are findings of mild CHF.  There is no focal consolidation to suggest  pneumonia.  
valve)    Hx of CABG    Warfarin anticoagulation    Subtherapeutic international normalized ratio (INR)    Moderate obesity    Dyspnea    Alcohol withdrawal syndrome without complication (HCC)    DTs (delirium tremens) (HCC)    Anemia    Severe protein-calorie malnutrition (HCC)    Acute decompensated heart failure (HCC)    Aortic valve disease    Mitral valve disease    Atrioventricular block    Pure hypercholesterolemia    Chronic obstructive pulmonary disease (HCC)    Loculated pleural effusion    Troponin level elevated    Stage 2 chronic kidney disease    Alcohol use    Breast swelling    Pleural effusion    Hypophosphatemia    Acute congestive heart failure (HCC)    FRANTZ (acute kidney injury) (HCC)    Acidosis    Hyperglycemia    Hyperlipidemia    Alcoholic liver disease, unspecified (HCC)    Thrombocytopenia (HCC)    Delirium         CPT code:  99041  bedside swallow sandra Barron M.S. CCC-SLP/L  Speech Language Pathologist  [phone]      
nephrology on board, steady improvement in kidney functions, no changes made.  DC planning, patient not yet ready.        Electronically signed by Abhay Burgos MD on 5/16/2024 at 7:46 AM  
remains on IV heparin.  Patient was at home on 2.5 mg of Coumadin, here getting 6 mg,  Cirrhosis, hepatic encephalopathy, patient remains on lactulose as well as rifaximin, under control.  FRANTZ, nephrology on board, did discuss with nephrology, patient has taken a step back, would like to watch him in-house today then further decision can be made.  DC planning, patient does have therapeutic INR, nephrology was clears then DC will happen.  Hopefully tomorrow.    Electronically signed by Abhay Burgos MD on 5/18/2024 at 8:29 AM  
Chest pain 03/24/2022         PLAN:     Neuro  Alcohol withdrawal  Thiamine and folic acid  CIWA protocol started on 5/8  Seizure precautions  Will space out phenobarbital to every 12 hours  Follow CMP     Hepatic encephalopathy  Lactulose     Respiratory  No acute complications     Cardiovascular  Chest pain  Troponin 54 --> 46 --> 38  Pain has resolved  Telemetry monitoring  Cardiology consult appreciated     CHF  Echo from February 2024 showed ejection fraction 45 to 50%, mechanical aortic prosthesis 6 and mechanical mitral prosthesis  Monitor I&O's   Bumex on hold  Cardiology consult appreciated     Hypertension  Monitor vital signs  Continue home metoprolol   Aldactone on hold      Mechanical valve  Stopped taking his warfarin at home  Heparin drip started on 5/9  Will see how he does with diet today  Restart warfarin if able to tolerate diet     GI  Cirrhosis  Due to alcohol abuse  EGD on 5/9 showed no acute bleeding or varices  Protonix twice daily  Liver imaging per GI  GI consultation appreciated     Renal  CKD  Baseline creatinine ~1.5  Monitor CMP  IV fluid bolus given in ED  LR at 75cc/hr        Infectious disease  No acute complications     Heme/Onc  Anemia  No acute signs of bleeding on EGD  Monitor hemoglobin daily  Transfuse if hemoglobin <7     Thrombocytopenia  Due to liver disease  Follow platelets daily  Transfuse as needed     Endocrine  No acute complications    Social/Spiritual/DNR/Other  Code status: Full Code  Diet ADULT DIET; Regular; Low Sodium (2 gm)  ADULT ORAL NUTRITION SUPPLEMENT; Breakfast; Clear Liquid Oral Supplement  ADULT ORAL NUTRITION SUPPLEMENT; Lunch, Dinner; Frozen Oral Supplement  Stress ulcer prophylaxis: Protonix  DVT prophylaxis: Heparin drip will try to start warfarin this evening  Transfer out of ICU today? No    Lines/catheters  Peripheral IVs    Patient remains critically ill and requiring ICU level care.    Emiliano Washington DO  11:47 AM  05/11/24    I personally saw, 
day  warfarin (COUMADIN) tablet 2.5 mg, 2.5 mg, Oral, Once  [START ON 5/11/2024] warfarin placeholder: dosing by pharmacy, , Other, RX Placeholder  lactated ringers IV soln infusion, , IntraVENous, Continuous  pantoprazole (PROTONIX) 40 mg in sodium chloride (PF) 0.9 % 10 mL injection, 40 mg, IntraVENous, Q12H  heparin (porcine) injection 4,000 Units, 4,000 Units, IntraVENous, PRN  heparin (porcine) injection 2,000 Units, 2,000 Units, IntraVENous, PRN  heparin 25,000 units in dextrose 5% 250 mL (premix) infusion, 5-30 Units/kg/hr, IntraVENous, Continuous  rifAXIMin (XIFAXAN) tablet 550 mg, 550 mg, Oral, BID  hydrALAZINE (APRESOLINE) tablet 10 mg, 10 mg, Oral, 3 times per day  isosorbide dinitrate (ISORDIL) tablet 5 mg, 5 mg, Oral, TID  thiamine tablet 100 mg, 100 mg, Oral, Daily  LORazepam (ATIVAN) tablet 1 mg, 1 mg, Oral, Q1H PRN **OR** LORazepam (ATIVAN) injection 1 mg, 1 mg, IntraVENous, Q1H PRN **OR** LORazepam (ATIVAN) tablet 2 mg, 2 mg, Oral, Q1H PRN **OR** LORazepam (ATIVAN) injection 2 mg, 2 mg, IntraVENous, Q1H PRN **OR** LORazepam (ATIVAN) tablet 3 mg, 3 mg, Oral, Q1H PRN **OR** LORazepam (ATIVAN) injection 3 mg, 3 mg, IntraVENous, Q1H PRN **OR** LORazepam (ATIVAN) tablet 4 mg, 4 mg, Oral, Q1H PRN **OR** LORazepam (ATIVAN) injection 4 mg, 4 mg, IntraVENous, Q1H PRN  atorvastatin (LIPITOR) tablet 20 mg, 20 mg, Oral, Nightly  metoprolol succinate (TOPROL XL) extended release tablet 25 mg, 25 mg, Oral, BID  [Held by provider] spironolactone (ALDACTONE) tablet 25 mg, 25 mg, Oral, Daily  traZODone (DESYREL) tablet 50 mg, 50 mg, Oral, Nightly  [Held by provider] bumetanide (BUMEX) tablet 2 mg, 2 mg, Oral, Daily  sodium chloride flush 0.9 % injection 5-40 mL, 5-40 mL, IntraVENous, 2 times per day  sodium chloride flush 0.9 % injection 5-40 mL, 5-40 mL, IntraVENous, PRN  0.9 % sodium chloride infusion, , IntraVENous, PRN  potassium chloride (KLOR-CON M) extended release tablet 40 mEq, 40 mEq, Oral, PRN **OR** 
echocardiographic contrast and appears at the upper limits of normal in size with borderline increased thickness of endocardial surfaces.  Echocardiographic contrast was administered to enhance endocardial definition.  Abnormal septal motion was present both on the basis of prior cardiac surgery and ventricular pacing in addition to abnormal activation of the apical segment on the basis of the latter.  No additional regional wall motion abnormalities are identified with mild left ventricular systolic dysfunction suggested.  An estimated ejection fraction of approximately 45-50% is calculated.  Indeterminate diastolic function is present.    Right Ventricle: The right ventricle is suboptimally visualized and appears dilated with right ventricular dysfunction suggested.    Aortic Valve: A mechanical aortic prosthesis is present which is suboptimally visualized with an inability to adequately assess trans prosthetic gradients.  Mild aortic insufficiency and likely periprosthetic in nature is present.    Mitral Valve: A mechanical mitral prosthesis is present and suboptimally visualized.  No significant mitral regurgitation is identified with a reported mean prosthetic gradient of 5 mmHg.    Left Atrium: The left atrium is suboptimally visualized and appears mildly dilated.  The intra-atrial septum was suboptimally visualized.    Aorta: The aortic root and ascending aorta are suboptimally visualized but appear mildly dilated.    Pericardium: The pericardium is suboptimally visualized with the inability to exclude a small, hemodynamically insignificant effusion anterior to the right atrium.    Image quality is poor. Contrast used: Definity. Technically difficult study and technically difficult Doppler study.    Echocardiogram - 5/7/2024:     Left Ventricle: The EF by visual approximation is 40 - 45%. Findings consistent with moderate concentric hypertrophy. Elevated left ventricular filling pressure.    Right Ventricle: 
of approximately 45-50% is calculated.  Indeterminate diastolic function is present.    Right Ventricle: The right ventricle is suboptimally visualized and appears dilated with right ventricular dysfunction suggested.    Aortic Valve: A mechanical aortic prosthesis is present which is suboptimally visualized with an inability to adequately assess trans prosthetic gradients.  Mild aortic insufficiency and likely periprosthetic in nature is present.    Mitral Valve: A mechanical mitral prosthesis is present and suboptimally visualized.  No significant mitral regurgitation is identified with a reported mean prosthetic gradient of 5 mmHg.    Left Atrium: The left atrium is suboptimally visualized and appears mildly dilated.  The intra-atrial septum was suboptimally visualized.    Aorta: The aortic root and ascending aorta are suboptimally visualized but appear mildly dilated.    Pericardium: The pericardium is suboptimally visualized with the inability to exclude a small, hemodynamically insignificant effusion anterior to the right atrium.    Image quality is poor. Contrast used: Definity. Technically difficult study and technically difficult Doppler study.    Echocardiogram - 5/7/2024:     Left Ventricle: The EF by visual approximation is 40 - 45%. Findings consistent with moderate concentric hypertrophy. Elevated left ventricular filling pressure.    Right Ventricle: Right ventricle size is normal. Reduced systolic function.    Aortic Valve: Not well visualized. Mechanical valve. AV mean gradient is 12 mmHg. LVOT:AV VTI Index is 0.34. AV area by continuity VTI is 1.1 cm2. AV area by peak velocity is 1.0 cm2.    Mitral Valve: Not well visualized. Mechanical valve. MV mean gradient is 4 mmHg.    Tricuspid Valve: Moderate regurgitation.    Pulmonic Valve: The pulmonic valve was not well visualized.    Left Atrium: Left atrium is severely dilated.    Image quality is technically difficult. Contrast used: Definity. 
Contrast used: Definity. Technically difficult study with poor endocardial visualization, technically difficult study due to patient's body habitus and procedure performed with the patient in a supine position.    Stress test:  NA      ASSESSMENT:  Chest pain, with elevated troponin and proBNP level, no further episodes of chest pain, chronic elevated troponin  Overall hypoxic respiratory failure, improved  Alcohol withdrawal, improving  VHD, s/p bileaflet mechanical AVR and [place] bileaflet MVR underwent in September 2020, s/p redo mechanical MVR and AVR in March 2023 at  for endocarditis on chronic warfarin therapy  CAD, s/p CABG LIMA to-LAD and September 2020, cath in March 2023 showed patent grafts  Mild to moderate LV dysfunction EF 40-45%, nonischemic cardiomyopathy  Complete heart block status post dual-chamber Medtronic pacer-1/20/2020  Paroxysmal atrial fibrillation.  In 2020  Hypertension, stable, /68  Hyperlipidemia  COPD  Status post CVA in 2015 with residual left-sided weakness  Alcoholic liver disease  Alcohol abuse with history of seizures currently undergoing DT prophylaxis  Fatty liver  Depression  Medication noncompliance  Testicular cancer s/p surgery and chemo underwent in 1998  Moderate anemia Hb 9  FRANTZ creatinine 2.1>> 1.8>> 1.7      Plan:   INR remains subtherapeutic after he was treated with vitamin K for supratherapeutic INR without any bleeding.  Continue IV heparin as a bridge to warfarin and keep INR between 2.5 and 3.5. INR 2.0  INR management as per pharmacy.  Continue Toprol-XL 25 mg p.o. daily with holding parameters  Continue midodrine 2.5 mg p.o. 3 times a day for hypotension hold if systolic blood pressure is more than 110 mmHg.  Continue to hold spironolactone due to FRANTZ creatinine still 1.7  Continue hydralazine 10 mg p.o. 3 times a day and increase isosorbide to 10 mg p.o. 3 times a day and Jardiance 10 mg p.o daily.  Hold Bumex and use it as needed  Continue 
hour   Intake 463.49 ml   Output 1085 ml   Net -621.51 ml       Labs:   CBC:   Recent Labs     05/11/24  0415 05/12/24  0416   WBC 6.5 6.7   HGB 9.5* 8.9*   HCT 32.6* 30.1*   PLT  --  98*     BMP:   Recent Labs     05/11/24  0415 05/12/24  0416    135   K 4.2 4.0   CO2 27 26   BUN 26* 26*   CREATININE 2.3* 2.1*   LABGLOM 33* 35*   CALCIUM 8.8 8.7     Mag:   Recent Labs     05/11/24  0415 05/12/24  0416   MG 2.0 1.8     Phos:   Recent Labs     05/11/24  0415 05/12/24  0416   PHOS 4.1 3.1     TSH:   Lab Results   Component Value Date    TSH 0.95 05/08/2024       HgA1c:   Lab Results   Component Value Date    LABA1C 4.9 05/08/2024       PRO-BNP:   Recent Labs     05/12/24  1230   PROBNP 2,139*       Component  Ref Range & Units 5/7/24 1153 3/4/24 0415 3/1/24 0157 2/27/24 1319 12/11/23 1615 7/23/23 1203 7/22/23 0437   Pro-BNP  0 - 125 pg/mL 829 High  976 High  CM 2,186 High  CM 2,040 High   High  CM 7,299 High  CM 6,252 High  CM     PT/INR:   Recent Labs     05/11/24  0415 05/12/24  0416   PROTIME 13.5* 15.0*   INR 1.2 1.4     APTT:  Recent Labs     05/12/24  0416 05/12/24  1230   APTT 110.8* 45.8*     HS TROP:  Lab Results   Component Value Date/Time    TROPHS 38 05/09/2024 05:51 AM    TROPHS 46 05/07/2024 01:39 PM    TROPHS 54 05/07/2024 09:24 AM    TROPHS 51 02/27/2024 12:53 PM    TROPHS 53 02/27/2024 10:03 AM    TROPHS 36 12/11/2023 06:37 PM    TROPHS 33 07/20/2023 04:12 AM    TROPHS 72 07/14/2023 05:43 PM    TROPHS 42 02/06/2023 10:43 AM    TROPHS 44 02/06/2023 09:47 AM    TROPHS 44 02/06/2023 09:47 AM     LIPID PANEL:  Lab Results   Component Value Date    CHOL 122 05/08/2024    TRIG 70 05/08/2024    HDL 48 05/08/2024    LDL 60 05/08/2024    VLDL 14 05/08/2024       LIVER PROFILE:  Recent Labs     05/11/24  0415 05/12/24  0416   AST 18 19   ALT 6 7       Transthoracic echocardiogram May 12, 2024    Left Ventricle: The EF by visual approximation is 40 - 45%. Findings consistent with moderate concentric 
05/08/2024    TRIG 70 05/08/2024    HDL 48 05/08/2024    LDL 60 05/08/2024    VLDL 14 05/08/2024       LIVER PROFILE:  Recent Labs     05/10/24  0712 05/11/24  0415   AST 18 18   ALT 8 6           Assessment  Chest pain  Elevated cardiac enzymes  Elevated BNP  Hypoxic respiratory failure  Supratherapeutic INR  Elevated lipase  Anemia  VHD  [place] bileaflet mechanical AVR and [place] bileaflet MVR in September 2020  status post redo mechanical MVR and AVR in March 2023 at  attributed to endocarditis   Chronic warfarin goal INR 2.5-3.5  CAD   s/p CABG LIMA  to LAD at same time as valve replacement 9/14/2020  Lexiscan Stress Test 5/2022: Normal MPI with attenuation artifact. LVEF 54% with normal wall motion.  [facility] 3/8/23 patent graft   Chronically elevated troponin  40s - 70s  Bifascicular block-right bundle branch block/  left anterior fascicular block  HFmEF LVEF 45-50% per TTE 2/2024   Complete heart block status post dual-chamber pacemaker Medtronic 2020   PAF during post op period in 2020   HTN  HLD  Obesity BMI 34.5  Tobacco abuse  COPD  CVA 2015 residual left-sided weakness  Carotid artery disease: Per patient 2015 Stent \"L neck\" after CVA [facility]   9/11/2020 Bilateral carotid Dopplers: 1-39% bilateral carotid stenosis   CKD baseline creatinine 1.5-1.7 mg/dL   Chronic anemia  History of retroperitoneal bleed 5/2022 supratherapeutic INR positive Hemoccult school treated with IV vitamin K and FFP.  Alcohol abuse  History of seizures from alcohol withdrawal  Alcoholic liver disease  Fatty liver infiltration  Left breast mass with negative ultrasound  Colon polyps  Testicular cancerstatus post surgery and chemotherapy 1988 in [place]  Vitamin D deficiency  Depression  Noncompliance    Plan:   Blood pressure appears stable with holding hydralazine and Isordil  Tolerating metoprolol succinate  Continue on heparin drip for management of mechanical mitral valve  Anemia workup per

## 2024-05-28 ENCOUNTER — CLINICAL DOCUMENTATION (OUTPATIENT)
Dept: GENERAL RADIOLOGY | Age: 59
End: 2024-05-28

## 2024-05-28 NOTE — PROGRESS NOTES
Patient canceled breast imaging appointment 3-21-24, no show 4-9-24.  Voice mails have been left by [facility] regarding rescheduling.  Provider notified.

## 2024-06-07 ENCOUNTER — TELEPHONE (OUTPATIENT)
Dept: OTHER | Age: 59
End: 2024-06-07

## 2024-08-02 ENCOUNTER — APPOINTMENT (OUTPATIENT)
Dept: GENERAL RADIOLOGY | Age: 59
DRG: 811 | End: 2024-08-02
Payer: MEDICARE

## 2024-08-02 ENCOUNTER — HOSPITAL ENCOUNTER (INPATIENT)
Age: 59
DRG: 811 | End: 2024-08-02
Attending: STUDENT IN AN ORGANIZED HEALTH CARE EDUCATION/TRAINING PROGRAM | Admitting: INTERNAL MEDICINE
Payer: MEDICARE

## 2024-08-02 DIAGNOSIS — M79.89 LEG SWELLING: ICD-10-CM

## 2024-08-02 DIAGNOSIS — D64.9 ANEMIA, UNSPECIFIED TYPE: Primary | ICD-10-CM

## 2024-08-02 PROBLEM — D62 ACUTE BLOOD LOSS ANEMIA: Status: ACTIVE | Noted: 2024-08-02

## 2024-08-02 LAB
ALBUMIN SERPL-MCNC: 3.8 G/DL (ref 3.5–5.2)
ALP SERPL-CCNC: 172 U/L (ref 40–129)
ALT SERPL-CCNC: 10 U/L (ref 0–40)
ANION GAP SERPL CALCULATED.3IONS-SCNC: 12 MMOL/L (ref 7–16)
AST SERPL-CCNC: 19 U/L (ref 0–39)
BASOPHILS # BLD: 0.07 K/UL (ref 0–0.2)
BASOPHILS NFR BLD: 1 % (ref 0–2)
BILIRUB SERPL-MCNC: 0.7 MG/DL (ref 0–1.2)
BILIRUB UR QL STRIP: NEGATIVE
BNP SERPL-MCNC: 1489 PG/ML (ref 0–125)
BUN SERPL-MCNC: 20 MG/DL (ref 6–20)
CALCIUM SERPL-MCNC: 8.8 MG/DL (ref 8.6–10.2)
CHLORIDE SERPL-SCNC: 109 MMOL/L (ref 98–107)
CLARITY UR: CLEAR
CO2 SERPL-SCNC: 20 MMOL/L (ref 22–29)
COLOR UR: YELLOW
CREAT SERPL-MCNC: 1.8 MG/DL (ref 0.7–1.2)
DAT POLY-SP REAG RBC QL: NEGATIVE
EOSINOPHIL # BLD: 0.24 K/UL (ref 0.05–0.5)
EOSINOPHILS RELATIVE PERCENT: 4 % (ref 0–6)
ERYTHROCYTE [DISTWIDTH] IN BLOOD BY AUTOMATED COUNT: 18.2 % (ref 11.5–15)
FERRITIN SERPL-MCNC: 18 NG/ML
FOLATE SERPL-MCNC: 9.2 NG/ML (ref 4.8–24.2)
GFR, ESTIMATED: 42 ML/MIN/1.73M2
GLUCOSE SERPL-MCNC: 98 MG/DL (ref 74–99)
GLUCOSE UR STRIP-MCNC: NEGATIVE MG/DL
HCT VFR BLD AUTO: 25.6 % (ref 37–54)
HCT VFR BLD AUTO: 28.9 % (ref 37–54)
HGB BLD-MCNC: 6.9 G/DL (ref 12.5–16.5)
HGB BLD-MCNC: 7.9 G/DL (ref 12.5–16.5)
HGB UR QL STRIP.AUTO: ABNORMAL
IMM GRANULOCYTES # BLD AUTO: 0.06 K/UL (ref 0–0.58)
IMM GRANULOCYTES NFR BLD: 1 % (ref 0–5)
INR PPP: 5
IRON SATN MFR SERPL: 5 % (ref 20–55)
IRON SERPL-MCNC: 20 UG/DL (ref 59–158)
KETONES UR STRIP-MCNC: NEGATIVE MG/DL
LACTATE BLDV-SCNC: 1.9 MMOL/L (ref 0.5–2.2)
LEUKOCYTE ESTERASE UR QL STRIP: NEGATIVE
LIPASE SERPL-CCNC: 32 U/L (ref 13–60)
LYMPHOCYTES NFR BLD: 0.98 K/UL (ref 1.5–4)
LYMPHOCYTES RELATIVE PERCENT: 14 % (ref 20–42)
MCH RBC QN AUTO: 22.1 PG (ref 26–35)
MCHC RBC AUTO-ENTMCNC: 27 G/DL (ref 32–34.5)
MCV RBC AUTO: 82.1 FL (ref 80–99.9)
MONOCYTES NFR BLD: 0.86 K/UL (ref 0.1–0.95)
MONOCYTES NFR BLD: 13 % (ref 2–12)
NEUTROPHILS NFR BLD: 68 % (ref 43–80)
NEUTS SEG NFR BLD: 4.59 K/UL (ref 1.8–7.3)
NITRITE UR QL STRIP: NEGATIVE
PH UR STRIP: 5.5 [PH] (ref 5–9)
PLATELET # BLD AUTO: 164 K/UL (ref 130–450)
PMV BLD AUTO: 12.5 FL (ref 7–12)
POTASSIUM SERPL-SCNC: 4 MMOL/L (ref 3.5–5)
PROT SERPL-MCNC: 6.7 G/DL (ref 6.4–8.3)
PROT UR STRIP-MCNC: 30 MG/DL
PROTHROMBIN TIME: 53.3 SEC (ref 9.3–12.4)
RBC # BLD AUTO: 3.12 M/UL (ref 3.8–5.8)
RBC # BLD: ABNORMAL 10*6/UL
RBC #/AREA URNS HPF: ABNORMAL /HPF
SODIUM SERPL-SCNC: 141 MMOL/L (ref 132–146)
SP GR UR STRIP: 1.02 (ref 1–1.03)
TIBC SERPL-MCNC: 392 UG/DL (ref 250–450)
TROPONIN I SERPL HS-MCNC: 42 NG/L (ref 0–11)
TROPONIN I SERPL HS-MCNC: 45 NG/L (ref 0–11)
UROBILINOGEN UR STRIP-ACNC: 1 EU/DL (ref 0–1)
VIT B12 SERPL-MCNC: 1778 PG/ML (ref 211–946)
WBC #/AREA URNS HPF: ABNORMAL /HPF
WBC OTHER # BLD: 6.8 K/UL (ref 4.5–11.5)

## 2024-08-02 PROCEDURE — 84484 ASSAY OF TROPONIN QUANT: CPT

## 2024-08-02 PROCEDURE — 93005 ELECTROCARDIOGRAM TRACING: CPT | Performed by: STUDENT IN AN ORGANIZED HEALTH CARE EDUCATION/TRAINING PROGRAM

## 2024-08-02 PROCEDURE — 71045 X-RAY EXAM CHEST 1 VIEW: CPT

## 2024-08-02 PROCEDURE — 85014 HEMATOCRIT: CPT

## 2024-08-02 PROCEDURE — 83690 ASSAY OF LIPASE: CPT

## 2024-08-02 PROCEDURE — 82728 ASSAY OF FERRITIN: CPT

## 2024-08-02 PROCEDURE — 6370000000 HC RX 637 (ALT 250 FOR IP): Performed by: STUDENT IN AN ORGANIZED HEALTH CARE EDUCATION/TRAINING PROGRAM

## 2024-08-02 PROCEDURE — 83880 ASSAY OF NATRIURETIC PEPTIDE: CPT

## 2024-08-02 PROCEDURE — 83540 ASSAY OF IRON: CPT

## 2024-08-02 PROCEDURE — 99285 EMERGENCY DEPT VISIT HI MDM: CPT

## 2024-08-02 PROCEDURE — 36430 TRANSFUSION BLD/BLD COMPNT: CPT

## 2024-08-02 PROCEDURE — 86900 BLOOD TYPING SEROLOGIC ABO: CPT

## 2024-08-02 PROCEDURE — 6370000000 HC RX 637 (ALT 250 FOR IP): Performed by: INTERNAL MEDICINE

## 2024-08-02 PROCEDURE — 86850 RBC ANTIBODY SCREEN: CPT

## 2024-08-02 PROCEDURE — 2060000000 HC ICU INTERMEDIATE R&B

## 2024-08-02 PROCEDURE — 83550 IRON BINDING TEST: CPT

## 2024-08-02 PROCEDURE — 85018 HEMOGLOBIN: CPT

## 2024-08-02 PROCEDURE — 85610 PROTHROMBIN TIME: CPT

## 2024-08-02 PROCEDURE — 83605 ASSAY OF LACTIC ACID: CPT

## 2024-08-02 PROCEDURE — P9016 RBC LEUKOCYTES REDUCED: HCPCS

## 2024-08-02 PROCEDURE — 2580000003 HC RX 258: Performed by: INTERNAL MEDICINE

## 2024-08-02 PROCEDURE — 86923 COMPATIBILITY TEST ELECTRIC: CPT

## 2024-08-02 PROCEDURE — 86901 BLOOD TYPING SEROLOGIC RH(D): CPT

## 2024-08-02 PROCEDURE — 30233N1 TRANSFUSION OF NONAUTOLOGOUS RED BLOOD CELLS INTO PERIPHERAL VEIN, PERCUTANEOUS APPROACH: ICD-10-PCS | Performed by: INTERNAL MEDICINE

## 2024-08-02 PROCEDURE — 6360000002 HC RX W HCPCS: Performed by: INTERNAL MEDICINE

## 2024-08-02 PROCEDURE — 82607 VITAMIN B-12: CPT

## 2024-08-02 PROCEDURE — 86880 COOMBS TEST DIRECT: CPT

## 2024-08-02 PROCEDURE — 80053 COMPREHEN METABOLIC PANEL: CPT

## 2024-08-02 PROCEDURE — 85025 COMPLETE CBC W/AUTO DIFF WBC: CPT

## 2024-08-02 PROCEDURE — 94640 AIRWAY INHALATION TREATMENT: CPT

## 2024-08-02 PROCEDURE — 6370000000 HC RX 637 (ALT 250 FOR IP): Performed by: NURSE PRACTITIONER

## 2024-08-02 PROCEDURE — 82746 ASSAY OF FOLIC ACID SERUM: CPT

## 2024-08-02 PROCEDURE — 81001 URINALYSIS AUTO W/SCOPE: CPT

## 2024-08-02 RX ORDER — POTASSIUM CHLORIDE 1500 MG/1
40 TABLET, EXTENDED RELEASE ORAL PRN
Status: DISCONTINUED | OUTPATIENT
Start: 2024-08-02 | End: 2024-08-10

## 2024-08-02 RX ORDER — IPRATROPIUM BROMIDE AND ALBUTEROL SULFATE 2.5; .5 MG/3ML; MG/3ML
1 SOLUTION RESPIRATORY (INHALATION) ONCE
Status: COMPLETED | OUTPATIENT
Start: 2024-08-02 | End: 2024-08-02

## 2024-08-02 RX ORDER — PANTOPRAZOLE SODIUM 40 MG/1
40 TABLET, DELAYED RELEASE ORAL
Status: DISCONTINUED | OUTPATIENT
Start: 2024-08-03 | End: 2024-08-17 | Stop reason: HOSPADM

## 2024-08-02 RX ORDER — HYDRALAZINE HYDROCHLORIDE 10 MG/1
10 TABLET, FILM COATED ORAL EVERY 8 HOURS SCHEDULED
Status: DISCONTINUED | OUTPATIENT
Start: 2024-08-02 | End: 2024-08-17 | Stop reason: HOSPADM

## 2024-08-02 RX ORDER — ASPIRIN 81 MG/1
81 TABLET, CHEWABLE ORAL DAILY
Status: DISCONTINUED | OUTPATIENT
Start: 2024-08-03 | End: 2024-08-17 | Stop reason: HOSPADM

## 2024-08-02 RX ORDER — FOLIC ACID 1 MG/1
1 TABLET ORAL DAILY
Status: DISCONTINUED | OUTPATIENT
Start: 2024-08-03 | End: 2024-08-17 | Stop reason: HOSPADM

## 2024-08-02 RX ORDER — ENOXAPARIN SODIUM 100 MG/ML
30 INJECTION SUBCUTANEOUS 2 TIMES DAILY
Status: DISCONTINUED | OUTPATIENT
Start: 2024-08-02 | End: 2024-08-03

## 2024-08-02 RX ORDER — BUSPIRONE HYDROCHLORIDE 5 MG/1
5 TABLET ORAL 3 TIMES DAILY
Status: DISCONTINUED | OUTPATIENT
Start: 2024-08-02 | End: 2024-08-17 | Stop reason: HOSPADM

## 2024-08-02 RX ORDER — PROCHLORPERAZINE EDISYLATE 5 MG/ML
10 INJECTION INTRAMUSCULAR; INTRAVENOUS EVERY 6 HOURS PRN
Status: DISCONTINUED | OUTPATIENT
Start: 2024-08-02 | End: 2024-08-17 | Stop reason: HOSPADM

## 2024-08-02 RX ORDER — SODIUM CHLORIDE 0.9 % (FLUSH) 0.9 %
5-40 SYRINGE (ML) INJECTION PRN
Status: DISCONTINUED | OUTPATIENT
Start: 2024-08-02 | End: 2024-08-17 | Stop reason: HOSPADM

## 2024-08-02 RX ORDER — BUMETANIDE 0.25 MG/ML
1 INJECTION INTRAMUSCULAR; INTRAVENOUS DAILY
Status: DISCONTINUED | OUTPATIENT
Start: 2024-08-03 | End: 2024-08-03

## 2024-08-02 RX ORDER — NICOTINE 21 MG/24HR
1 PATCH, TRANSDERMAL 24 HOURS TRANSDERMAL DAILY
Status: DISCONTINUED | OUTPATIENT
Start: 2024-08-03 | End: 2024-08-17 | Stop reason: HOSPADM

## 2024-08-02 RX ORDER — PROCHLORPERAZINE MALEATE 10 MG
10 TABLET ORAL EVERY 8 HOURS PRN
Status: DISCONTINUED | OUTPATIENT
Start: 2024-08-02 | End: 2024-08-17 | Stop reason: HOSPADM

## 2024-08-02 RX ORDER — ACETAMINOPHEN 325 MG/1
650 TABLET ORAL EVERY 6 HOURS PRN
Status: DISCONTINUED | OUTPATIENT
Start: 2024-08-02 | End: 2024-08-03

## 2024-08-02 RX ORDER — METOPROLOL SUCCINATE 25 MG/1
25 TABLET, EXTENDED RELEASE ORAL 2 TIMES DAILY
Status: DISCONTINUED | OUTPATIENT
Start: 2024-08-02 | End: 2024-08-17 | Stop reason: HOSPADM

## 2024-08-02 RX ORDER — VITAMIN B COMPLEX
1000 TABLET ORAL DAILY
Status: DISCONTINUED | OUTPATIENT
Start: 2024-08-03 | End: 2024-08-17 | Stop reason: HOSPADM

## 2024-08-02 RX ORDER — OXYCODONE HYDROCHLORIDE 5 MG/1
5 TABLET ORAL ONCE
Status: COMPLETED | OUTPATIENT
Start: 2024-08-02 | End: 2024-08-02

## 2024-08-02 RX ORDER — POLYETHYLENE GLYCOL 3350 17 G/17G
17 POWDER, FOR SOLUTION ORAL DAILY PRN
Status: DISCONTINUED | OUTPATIENT
Start: 2024-08-02 | End: 2024-08-17 | Stop reason: HOSPADM

## 2024-08-02 RX ORDER — ATORVASTATIN CALCIUM 20 MG/1
20 TABLET, FILM COATED ORAL NIGHTLY
Status: DISCONTINUED | OUTPATIENT
Start: 2024-08-02 | End: 2024-08-17 | Stop reason: HOSPADM

## 2024-08-02 RX ORDER — MIRTAZAPINE 15 MG/1
7.5 TABLET, FILM COATED ORAL NIGHTLY
Status: DISCONTINUED | OUTPATIENT
Start: 2024-08-02 | End: 2024-08-17 | Stop reason: HOSPADM

## 2024-08-02 RX ORDER — ONDANSETRON 4 MG/1
4 TABLET, ORALLY DISINTEGRATING ORAL EVERY 8 HOURS PRN
Status: DISCONTINUED | OUTPATIENT
Start: 2024-08-02 | End: 2024-08-02 | Stop reason: CLARIF

## 2024-08-02 RX ORDER — ACETAMINOPHEN 650 MG/1
650 SUPPOSITORY RECTAL EVERY 6 HOURS PRN
Status: DISCONTINUED | OUTPATIENT
Start: 2024-08-02 | End: 2024-08-03

## 2024-08-02 RX ORDER — LANOLIN ALCOHOL/MO/W.PET/CERES
100 CREAM (GRAM) TOPICAL DAILY
Status: DISCONTINUED | OUTPATIENT
Start: 2024-08-03 | End: 2024-08-17 | Stop reason: HOSPADM

## 2024-08-02 RX ORDER — SODIUM CHLORIDE 9 MG/ML
INJECTION, SOLUTION INTRAVENOUS PRN
Status: DISCONTINUED | OUTPATIENT
Start: 2024-08-02 | End: 2024-08-17 | Stop reason: HOSPADM

## 2024-08-02 RX ORDER — MAGNESIUM SULFATE IN WATER 40 MG/ML
2000 INJECTION, SOLUTION INTRAVENOUS PRN
Status: DISCONTINUED | OUTPATIENT
Start: 2024-08-02 | End: 2024-08-10

## 2024-08-02 RX ORDER — MIDODRINE HYDROCHLORIDE 5 MG/1
5 TABLET ORAL
Status: DISCONTINUED | OUTPATIENT
Start: 2024-08-03 | End: 2024-08-17 | Stop reason: HOSPADM

## 2024-08-02 RX ORDER — ONDANSETRON 2 MG/ML
4 INJECTION INTRAMUSCULAR; INTRAVENOUS EVERY 6 HOURS PRN
Status: DISCONTINUED | OUTPATIENT
Start: 2024-08-02 | End: 2024-08-02 | Stop reason: CLARIF

## 2024-08-02 RX ORDER — ISOSORBIDE DINITRATE 10 MG/1
10 TABLET ORAL 3 TIMES DAILY
Status: DISCONTINUED | OUTPATIENT
Start: 2024-08-02 | End: 2024-08-17 | Stop reason: HOSPADM

## 2024-08-02 RX ORDER — SODIUM CHLORIDE 0.9 % (FLUSH) 0.9 %
5-40 SYRINGE (ML) INJECTION EVERY 12 HOURS SCHEDULED
Status: DISCONTINUED | OUTPATIENT
Start: 2024-08-02 | End: 2024-08-17 | Stop reason: HOSPADM

## 2024-08-02 RX ORDER — POTASSIUM CHLORIDE 7.45 MG/ML
10 INJECTION INTRAVENOUS PRN
Status: DISCONTINUED | OUTPATIENT
Start: 2024-08-02 | End: 2024-08-10

## 2024-08-02 RX ORDER — LACTULOSE 10 G/15ML
20 SOLUTION ORAL DAILY
Status: DISCONTINUED | OUTPATIENT
Start: 2024-08-03 | End: 2024-08-17 | Stop reason: HOSPADM

## 2024-08-02 RX ADMIN — RIFAXIMIN 550 MG: 550 TABLET ORAL at 23:52

## 2024-08-02 RX ADMIN — ATORVASTATIN CALCIUM 20 MG: 20 TABLET, FILM COATED ORAL at 22:27

## 2024-08-02 RX ADMIN — IPRATROPIUM BROMIDE AND ALBUTEROL SULFATE 1 DOSE: 2.5; .5 SOLUTION RESPIRATORY (INHALATION) at 17:58

## 2024-08-02 RX ADMIN — SODIUM CHLORIDE, PRESERVATIVE FREE 10 ML: 5 INJECTION INTRAVENOUS at 22:30

## 2024-08-02 RX ADMIN — MIRTAZAPINE 7.5 MG: 15 TABLET, FILM COATED ORAL at 22:27

## 2024-08-02 RX ADMIN — ISOSORBIDE DINITRATE 10 MG: 10 TABLET ORAL at 22:27

## 2024-08-02 RX ADMIN — ENOXAPARIN SODIUM 30 MG: 100 INJECTION SUBCUTANEOUS at 22:27

## 2024-08-02 RX ADMIN — OXYCODONE HYDROCHLORIDE 5 MG: 5 TABLET ORAL at 23:27

## 2024-08-02 RX ADMIN — HYDRALAZINE HYDROCHLORIDE 10 MG: 10 TABLET ORAL at 22:37

## 2024-08-02 RX ADMIN — METOPROLOL SUCCINATE 25 MG: 25 TABLET, EXTENDED RELEASE ORAL at 22:27

## 2024-08-02 RX ADMIN — ACETAMINOPHEN 650 MG: 325 TABLET ORAL at 22:26

## 2024-08-02 RX ADMIN — BUSPIRONE HYDROCHLORIDE 5 MG: 5 TABLET ORAL at 22:27

## 2024-08-02 ASSESSMENT — LIFESTYLE VARIABLES
HOW MANY STANDARD DRINKS CONTAINING ALCOHOL DO YOU HAVE ON A TYPICAL DAY: PATIENT DOES NOT DRINK
HOW OFTEN DO YOU HAVE A DRINK CONTAINING ALCOHOL: NEVER

## 2024-08-02 ASSESSMENT — PAIN - FUNCTIONAL ASSESSMENT: PAIN_FUNCTIONAL_ASSESSMENT: NONE - DENIES PAIN

## 2024-08-02 ASSESSMENT — PAIN SCALES - GENERAL: PAINLEVEL_OUTOF10: 7

## 2024-08-02 NOTE — PROGRESS NOTES
Database initiated pharmacy and medications verified with the patient. He is A&O comes in from home with GF. He uses no assistive devices and is RA at baseline. States he has not taken his medications in weeks because he can not affords them he is on disability. He is only taking Coumadin and Folic acid.

## 2024-08-02 NOTE — H&P
[facility] Hospitalist [facility]   History and Physical      CHIEF COMPLAINT:  SOB    History of Present Illness:  59 y.o. male with a history of alcoholic liver disease, aortic valve replacement, mitral valve replacement, stroke, testicular ca s/p chemotherapy, CAD s/p CABG on coumadin presents with SOB for the past few months. Patient has also endorsed some difficulty with ambulation due to lower extremity swelling for the past two weeks. Also appreciates swelling to his left chest which he notes he noticed two weeks ago. Upon exam, patient was verbally abusive and rude to staff. Patient states \"I have been coming here for months and none of you hacks can figure out what's wrong with me.\" When trying to obtain patients history he provided little information and made several comments about not caring about his health. Hgb 6.9 on CBC, creat 1.8, BNP elevated at 1,489, troponin elevated at 45>42, INR 5.0. CXR showed cardiomegaly with interstitial changes in the lung fields.   Patient had recent admission from 5/7-5/19 for anemia and supratherapeutic INR and FRANTZ on CKD. Patient was seen by cardiology, psychology, GI. EGD showed gastritis and duodenitis. Patient did have a brief ICU stay for ETOH withdrawal. He was d/c with follow ups, which he was unable to make due to financial issues.     Informant(s) for H&P: Patient and EMR    REVIEW OF SYSTEMS:  SOB, cp, chills, nausea. no fevers, vomiting, ha, vision/hearing changes, wt changes, hot/cold flashes, other open skin lesions, diarrhea, constipation, dysuria/hematuria unless noted in HPI. Complete ROS performed with the patient and is otherwise negative.      PMH:  Past Medical History:   Diagnosis Date    Alcoholic liver disease (HCC)     H/O prosthetic aortic valve replacement 09/2020    [facility]  Mitral and pacemaker at this time as well    History of mitral valve replacement 09/2020    at [facility] - main  Aortic Valve placed at this time  deficit and speech normal    LABS:  Recent Labs     08/02/24  1235      K 4.0   *   CO2 20*   BUN 20   CREATININE 1.8*   GLUCOSE 98   CALCIUM 8.8       Recent Labs     08/02/24  1235   WBC 6.8   RBC 3.12*   HGB 6.9*   HCT 25.6*   MCV 82.1   MCH 22.1*   MCHC 27.0*   RDW 18.2*      MPV 12.5*       No results for input(s): \"POCGLU\" in the last 72 hours.        Radiology: XR CHEST PORTABLE    Result Date: 8/2/2024  EXAMINATION: ONE XRAY VIEW OF THE CHEST 8/2/2024 1:01 pm COMPARISON: 05/14/2024 HISTORY: ORDERING SYSTEM PROVIDED HISTORY: shortness of breath TECHNOLOGIST PROVIDED HISTORY: Reason for exam:->shortness of breath FINDINGS: Portable chest reveals heart to be enlarged.  Patient is status post median sternotomy.  Underlying chronic interstitial changes seen in the lung fields. Lung volumes remain low.  No new focal parenchymal opacification present. Pacer leads in satisfactory position with degenerative changes seen within the spine.  There is stable pleural thickening identified along the right lateral chest wall.     Cardiomegaly with underlying chronic interstitial changes seen in the lung fields. No new focal parenchymal opacification present.       EKG:     ASSESSMENT:      Active Problems:    Acute blood loss anemia  Resolved Problems:    * No resolved hospital problems. *      PLAN:    Acute blood loss anemia- Hgb 6.9. FOBT negative. Monitor cbc. Transfuse <7.0. direct antiglobulin test, ferritin, iron and TIBC, vit B12 and folate ordered. 1u PRBCs given in ED. Repeat H&H post transfusion.   CKD- Creat 1.8. Pt baseline 1.6-2.0. Avoid nephrotoxic agents. Monitor on daily BMP.   Alcoholic liver disease- patient was scheduled with GI for follow up but did not go to the appointment. Continue lactulose, thiamine, and rifaximin.   CAD- patient is on warfarin. INR 5. Hold warfarin d/t acute blood loss anemia.   HTN- continue Toprol XL and hydralazine. Adjust as needed.   HLD- continue

## 2024-08-02 NOTE — ED PROVIDER NOTES
Department of Emergency Medicine   ED  Provider Note  Admit Date/RoomTime: 8/2/2024 12:11 PM  ED Room: 25/25              \[facility]\""     Len Dorsey is a 59 y.o. male with a PMHx significant for  EtOH use, CAD, anticoagulated on Coumadin, CHF, HLD  who presents for evaluation of shortness of breath, beginning prior to arrival.  The complaint has been persistent, severe in severity, and worsened by movement and exertion.   The patient states that he feels like he is full of water once again.  States that he has been having a hard time with ambulation and exertion, feels more short of breath when he goes to do anything.  He cannot take a couple steps without having to stop to catch his breath.  Denies any fevers or chills, denies any chest discomfort.  Does have history of EtOH use, states he end up using yesterday.  Notes his right lower extremity is swollen as well.  Denies any history of gout.     Review of Systems   Constitutional:  Negative for chills and fever.   HENT:  Negative for ear pain, sinus pressure and sore throat.    Eyes:  Negative for pain, discharge and redness.   Respiratory:  Positive for shortness of breath. Negative for cough and wheezing.    Cardiovascular:  Positive for leg swelling. Negative for chest pain.   Gastrointestinal:  Negative for abdominal pain, diarrhea, nausea and vomiting.   Genitourinary:  Negative for dysuria and frequency.   Musculoskeletal:  Negative for arthralgias and back pain.   Skin:  Negative for rash and wound.   Neurological:  Negative for weakness and headaches.   Hematological:  Negative for adenopathy.   All other systems reviewed and are negative.       Physical Exam  Vitals and nursing note reviewed.   Constitutional:       General: He is not in acute distress.     Appearance: He is well-developed. He is not ill-appearing.   HENT:      Head: Normocephalic and atraumatic.      Right Ear: External ear normal.      Left Ear: External ear normal.   Eyes:       O NEGATIVE     Antibody Screen NEGATIVE     Unit Number [id number]     Component Leukocyte Reduced Red Cell     Unit Divison 00     Dispense Status Blood Bank TRANSFUSED     Unit Issue Date/Time [id number]     Product Code Blood Bank [id number]     Blood Bank Unit Type and Rh O NEG     Blood Bank ISBT Product Blood Type 9500     Blood Bank Blood Product Expiration Date [id number]     Transfusion Status OK TO TRANSFUSE     Crossmatch Result COMPATIBLE    DIRECT ANTIGLOBULIN TEST   Result Value Ref Range    ERASMO, Polyspecific NEGATIVE        RADIOLOGY:  Vascular duplex lower extremity venous right   Final Result   No evidence of DVT in the right lower extremity.         XR CHEST PORTABLE   Final Result   Cardiomegaly with underlying chronic interstitial changes seen in the lung   fields. No new focal parenchymal opacification present.             ------------------------- NURSING NOTES AND VITALS REVIEWED ---------------------------  Date / Time Roomed:  8/2/2024 12:11 PM  ED Bed Assignment:  0601/0601-A    The nursing notes within the ED encounter and vital signs as below have been reviewed.     Patient Vitals for the past 24 hrs:   BP Temp Temp src Pulse Resp SpO2 Weight   08/06/24 1457 110/61 97.8 °F (36.6 °C) Oral 74 18 -- --   08/06/24 1054 (!) 92/50 97.6 °F (36.4 °C) Oral 78 18 91 % --   08/06/24 0724 112/67 98.1 °F (36.7 °C) Oral 74 18 99 % --   08/06/24 0519 -- -- -- -- -- -- 110.4 kg (243 lb 6.4 oz)   08/06/24 0245 108/67 97.8 °F (36.6 °C) Oral 74 18 99 % --   08/05/24 2343 97/77 97.9 °F (36.6 °C) Oral 99 18 100 % --   08/05/24 2102 98/61 -- -- -- -- -- --   08/05/24 1857 98/61 98.1 °F (36.7 °C) Oral 71 18 100 % --       Oxygen Saturation Interpretation: Normal    ------------------------------------------ PROGRESS NOTES ------------------------------------------  Counseling:  I have spoken with the patient and discussed today’s results, in addition to providing specific details for the plan of care and  counseling regarding the diagnosis and prognosis.  Their questions are answered at this time and they are agreeable with the plan of admission.    --------------------------------- ADDITIONAL PROVIDER NOTES ---------------------------------  Consultations:  Spoke with Hospitalist.  Discussed case.  They will admit the patient.  This patient's ED course included: a personal history and physicial examination, re-evaluation prior to disposition, multiple bedside re-evaluations, IV medications, cardiac monitoring, continuous pulse oximetry, and complex medical decision making and emergency management    This patient has remained hemodynamically stable and been closely monitored during their ED course.    Please note that the withdrawal or failure to initiate urgent interventions for this patient would likely result in a life threatening deterioration or permanent disability.      Accordingly this patient received 30 minutes of critical care time, excluding separately billable procedures.      Diagnosis:  1. Anemia, unspecified type    2. Leg swelling        Disposition:  Patient's disposition: Admit to telemetry  Patient's condition is stable.    Please note that the above documentation was prepared using voice recognition software. Every attempt was made to ensure accuracy but there may be spelling, grammatical, and contextual errors.       Jeffry Kirby,   08/06/24 1904

## 2024-08-03 PROBLEM — I50.22 HEART FAILURE WITH MID-RANGE EJECTION FRACTION (HFMEF) (HCC): Status: ACTIVE | Noted: 2024-08-03

## 2024-08-03 PROBLEM — Z95.0 NORMALLY FUNCTIONING CARDIAC PACEMAKER PRESENT: Status: ACTIVE | Noted: 2024-08-03

## 2024-08-03 PROBLEM — I50.43 ACUTE ON CHRONIC COMBINED SYSTOLIC AND DIASTOLIC CONGESTIVE HEART FAILURE (HCC): Status: ACTIVE | Noted: 2024-08-03

## 2024-08-03 LAB
ABO/RH: NORMAL
ALBUMIN SERPL-MCNC: 3.5 G/DL (ref 3.5–5.2)
ALP SERPL-CCNC: 142 U/L (ref 40–129)
ALT SERPL-CCNC: 9 U/L (ref 0–40)
AMMONIA PLAS-SCNC: 70 UMOL/L (ref 16–60)
ANION GAP SERPL CALCULATED.3IONS-SCNC: 8 MMOL/L (ref 7–16)
ANTIBODY SCREEN: NEGATIVE
ARM BAND NUMBER: NORMAL
AST SERPL-CCNC: 16 U/L (ref 0–39)
BASOPHILS # BLD: 0.08 K/UL (ref 0–0.2)
BASOPHILS # BLD: 0.09 K/UL (ref 0–0.2)
BASOPHILS NFR BLD: 1 % (ref 0–2)
BASOPHILS NFR BLD: 1 % (ref 0–2)
BILIRUB SERPL-MCNC: 1.1 MG/DL (ref 0–1.2)
BLOOD BANK BLOOD PRODUCT EXPIRATION DATE: NORMAL
BLOOD BANK DISPENSE STATUS: NORMAL
BLOOD BANK ISBT PRODUCT BLOOD TYPE: 9500
BLOOD BANK PRODUCT CODE: NORMAL
BLOOD BANK SAMPLE EXPIRATION: NORMAL
BLOOD BANK UNIT TYPE AND RH: NORMAL
BPU ID: NORMAL
BUN SERPL-MCNC: 17 MG/DL (ref 6–20)
CALCIUM SERPL-MCNC: 7.3 MG/DL (ref 8.6–10.2)
CHLORIDE SERPL-SCNC: 113 MMOL/L (ref 98–107)
CO2 SERPL-SCNC: 19 MMOL/L (ref 22–29)
COMPONENT: NORMAL
CREAT SERPL-MCNC: 1.6 MG/DL (ref 0.7–1.2)
CROSSMATCH RESULT: NORMAL
EOSINOPHIL # BLD: 0.33 K/UL (ref 0.05–0.5)
EOSINOPHIL # BLD: 0.38 K/UL (ref 0.05–0.5)
EOSINOPHILS RELATIVE PERCENT: 4 % (ref 0–6)
EOSINOPHILS RELATIVE PERCENT: 4 % (ref 0–6)
ERYTHROCYTE [DISTWIDTH] IN BLOOD BY AUTOMATED COUNT: 18.3 % (ref 11.5–15)
ERYTHROCYTE [DISTWIDTH] IN BLOOD BY AUTOMATED COUNT: 18.5 % (ref 11.5–15)
GFR, ESTIMATED: 52 ML/MIN/1.73M2
GLUCOSE SERPL-MCNC: 97 MG/DL (ref 74–99)
HCT VFR BLD AUTO: 28.6 % (ref 37–54)
HCT VFR BLD AUTO: 32.5 % (ref 37–54)
HGB BLD-MCNC: 8.1 G/DL (ref 12.5–16.5)
HGB BLD-MCNC: 8.9 G/DL (ref 12.5–16.5)
IMM GRANULOCYTES # BLD AUTO: 0.03 K/UL (ref 0–0.58)
IMM GRANULOCYTES # BLD AUTO: 0.04 K/UL (ref 0–0.58)
IMM GRANULOCYTES NFR BLD: 0 % (ref 0–5)
IMM GRANULOCYTES NFR BLD: 1 % (ref 0–5)
IMM RETICS NFR: 28.8 % (ref 2.3–13.4)
INR PPP: 5
LDH SERPL-CCNC: 323 U/L (ref 135–225)
LYMPHOCYTES NFR BLD: 0.8 K/UL (ref 1.5–4)
LYMPHOCYTES NFR BLD: 1.13 K/UL (ref 1.5–4)
LYMPHOCYTES RELATIVE PERCENT: 14 % (ref 20–42)
LYMPHOCYTES RELATIVE PERCENT: 9 % (ref 20–42)
MAGNESIUM SERPL-MCNC: 1.4 MG/DL (ref 1.6–2.6)
MCH RBC QN AUTO: 23.1 PG (ref 26–35)
MCH RBC QN AUTO: 23.7 PG (ref 26–35)
MCHC RBC AUTO-ENTMCNC: 27.4 G/DL (ref 32–34.5)
MCHC RBC AUTO-ENTMCNC: 28.3 G/DL (ref 32–34.5)
MCV RBC AUTO: 83.6 FL (ref 80–99.9)
MCV RBC AUTO: 84.2 FL (ref 80–99.9)
MONOCYTES NFR BLD: 0.86 K/UL (ref 0.1–0.95)
MONOCYTES NFR BLD: 0.89 K/UL (ref 0.1–0.95)
MONOCYTES NFR BLD: 10 % (ref 2–12)
MONOCYTES NFR BLD: 11 % (ref 2–12)
NEUTROPHILS NFR BLD: 71 % (ref 43–80)
NEUTROPHILS NFR BLD: 76 % (ref 43–80)
NEUTS SEG NFR BLD: 5.92 K/UL (ref 1.8–7.3)
NEUTS SEG NFR BLD: 6.69 K/UL (ref 1.8–7.3)
PHOSPHATE SERPL-MCNC: 2.6 MG/DL (ref 2.5–4.5)
PLATELET # BLD AUTO: 177 K/UL (ref 130–450)
PLATELET, FLUORESCENCE: 146 K/UL (ref 130–450)
PMV BLD AUTO: 11.7 FL (ref 7–12)
PMV BLD AUTO: 12.9 FL (ref 7–12)
POTASSIUM SERPL-SCNC: 3.5 MMOL/L (ref 3.5–5)
PROT SERPL-MCNC: 6.1 G/DL (ref 6.4–8.3)
PROTHROMBIN TIME: 52.9 SEC (ref 9.3–12.4)
RBC # BLD AUTO: 3.42 M/UL (ref 3.8–5.8)
RBC # BLD AUTO: 3.86 M/UL (ref 3.8–5.8)
RBC # BLD: ABNORMAL 10*6/UL
RETIC HEMOGLOBIN: 16.6 PG (ref 28.2–36.6)
RETICS # AUTO: 0.09 M/UL
RETICS/RBC NFR AUTO: 2.4 % (ref 0.4–1.9)
SODIUM SERPL-SCNC: 140 MMOL/L (ref 132–146)
TRANSFUSION STATUS: NORMAL
UNIT DIVISION: 0
UNIT ISSUE DATE/TIME: NORMAL
WBC OTHER # BLD: 8.4 K/UL (ref 4.5–11.5)
WBC OTHER # BLD: 8.9 K/UL (ref 4.5–11.5)

## 2024-08-03 PROCEDURE — 85025 COMPLETE CBC W/AUTO DIFF WBC: CPT

## 2024-08-03 PROCEDURE — 85045 AUTOMATED RETICULOCYTE COUNT: CPT

## 2024-08-03 PROCEDURE — 84100 ASSAY OF PHOSPHORUS: CPT

## 2024-08-03 PROCEDURE — 99233 SBSQ HOSP IP/OBS HIGH 50: CPT | Performed by: INTERNAL MEDICINE

## 2024-08-03 PROCEDURE — 99223 1ST HOSP IP/OBS HIGH 75: CPT | Performed by: INTERNAL MEDICINE

## 2024-08-03 PROCEDURE — 6360000002 HC RX W HCPCS: Performed by: CLINICAL NURSE SPECIALIST

## 2024-08-03 PROCEDURE — 6360000002 HC RX W HCPCS: Performed by: INTERNAL MEDICINE

## 2024-08-03 PROCEDURE — 83615 LACTATE (LD) (LDH) ENZYME: CPT

## 2024-08-03 PROCEDURE — 6370000000 HC RX 637 (ALT 250 FOR IP): Performed by: CLINICAL NURSE SPECIALIST

## 2024-08-03 PROCEDURE — 82140 ASSAY OF AMMONIA: CPT

## 2024-08-03 PROCEDURE — 83735 ASSAY OF MAGNESIUM: CPT

## 2024-08-03 PROCEDURE — 2580000003 HC RX 258: Performed by: INTERNAL MEDICINE

## 2024-08-03 PROCEDURE — 36415 COLL VENOUS BLD VENIPUNCTURE: CPT

## 2024-08-03 PROCEDURE — 85610 PROTHROMBIN TIME: CPT

## 2024-08-03 PROCEDURE — 80053 COMPREHEN METABOLIC PANEL: CPT

## 2024-08-03 PROCEDURE — APPSS180 APP SPLIT SHARED TIME > 60 MINUTES: Performed by: CLINICAL NURSE SPECIALIST

## 2024-08-03 PROCEDURE — 6370000000 HC RX 637 (ALT 250 FOR IP): Performed by: INTERNAL MEDICINE

## 2024-08-03 PROCEDURE — 2060000000 HC ICU INTERMEDIATE R&B

## 2024-08-03 RX ORDER — BUMETANIDE 0.25 MG/ML
1 INJECTION INTRAMUSCULAR; INTRAVENOUS 2 TIMES DAILY
Status: DISCONTINUED | OUTPATIENT
Start: 2024-08-03 | End: 2024-08-08

## 2024-08-03 RX ORDER — OXYCODONE HYDROCHLORIDE 5 MG/1
5 TABLET ORAL EVERY 6 HOURS PRN
Status: DISCONTINUED | OUTPATIENT
Start: 2024-08-03 | End: 2024-08-06

## 2024-08-03 RX ORDER — POTASSIUM CHLORIDE 1500 MG/1
40 TABLET, EXTENDED RELEASE ORAL ONCE
Status: COMPLETED | OUTPATIENT
Start: 2024-08-03 | End: 2024-08-03

## 2024-08-03 RX ORDER — MAGNESIUM SULFATE IN WATER 40 MG/ML
2000 INJECTION, SOLUTION INTRAVENOUS
Status: COMPLETED | OUTPATIENT
Start: 2024-08-03 | End: 2024-08-03

## 2024-08-03 RX ORDER — OXYCODONE HYDROCHLORIDE 5 MG/1
10 TABLET ORAL EVERY 6 HOURS PRN
Status: DISCONTINUED | OUTPATIENT
Start: 2024-08-03 | End: 2024-08-17 | Stop reason: HOSPADM

## 2024-08-03 RX ORDER — METHOCARBAMOL 750 MG/1
750 TABLET, FILM COATED ORAL 4 TIMES DAILY
Status: DISCONTINUED | OUTPATIENT
Start: 2024-08-03 | End: 2024-08-17 | Stop reason: HOSPADM

## 2024-08-03 RX ADMIN — OXYCODONE HYDROCHLORIDE 5 MG: 5 TABLET ORAL at 09:47

## 2024-08-03 RX ADMIN — PANTOPRAZOLE SODIUM 40 MG: 40 TABLET, DELAYED RELEASE ORAL at 15:08

## 2024-08-03 RX ADMIN — FOLIC ACID 1 MG: 1 TABLET ORAL at 08:11

## 2024-08-03 RX ADMIN — ATORVASTATIN CALCIUM 20 MG: 20 TABLET, FILM COATED ORAL at 21:32

## 2024-08-03 RX ADMIN — METHOCARBAMOL TABLETS 750 MG: 750 TABLET, COATED ORAL at 21:32

## 2024-08-03 RX ADMIN — BUMETANIDE 1 MG: 0.25 INJECTION INTRAMUSCULAR; INTRAVENOUS at 16:47

## 2024-08-03 RX ADMIN — HYDRALAZINE HYDROCHLORIDE 10 MG: 10 TABLET ORAL at 13:50

## 2024-08-03 RX ADMIN — ENOXAPARIN SODIUM 30 MG: 100 INJECTION SUBCUTANEOUS at 08:12

## 2024-08-03 RX ADMIN — LACTULOSE 20 G: 10 SOLUTION ORAL at 08:10

## 2024-08-03 RX ADMIN — RIFAXIMIN 550 MG: 550 TABLET ORAL at 08:11

## 2024-08-03 RX ADMIN — METHOCARBAMOL TABLETS 750 MG: 750 TABLET, COATED ORAL at 16:47

## 2024-08-03 RX ADMIN — HYDRALAZINE HYDROCHLORIDE 10 MG: 10 TABLET ORAL at 05:29

## 2024-08-03 RX ADMIN — MIDODRINE HYDROCHLORIDE 5 MG: 5 TABLET ORAL at 08:11

## 2024-08-03 RX ADMIN — BUMETANIDE 1 MG: 0.25 INJECTION INTRAMUSCULAR; INTRAVENOUS at 11:46

## 2024-08-03 RX ADMIN — OXYCODONE HYDROCHLORIDE 10 MG: 5 TABLET ORAL at 23:15

## 2024-08-03 RX ADMIN — SODIUM CHLORIDE, PRESERVATIVE FREE 10 ML: 5 INJECTION INTRAVENOUS at 21:33

## 2024-08-03 RX ADMIN — Medication 1000 UNITS: at 08:11

## 2024-08-03 RX ADMIN — ISOSORBIDE DINITRATE 10 MG: 10 TABLET ORAL at 21:32

## 2024-08-03 RX ADMIN — BUSPIRONE HYDROCHLORIDE 5 MG: 5 TABLET ORAL at 08:10

## 2024-08-03 RX ADMIN — MAGNESIUM SULFATE HEPTAHYDRATE 2000 MG: 40 INJECTION, SOLUTION INTRAVENOUS at 09:50

## 2024-08-03 RX ADMIN — METOPROLOL SUCCINATE 25 MG: 25 TABLET, EXTENDED RELEASE ORAL at 21:32

## 2024-08-03 RX ADMIN — SODIUM CHLORIDE 125 MG: 900 INJECTION INTRAVENOUS at 11:50

## 2024-08-03 RX ADMIN — ISOSORBIDE DINITRATE 10 MG: 10 TABLET ORAL at 13:50

## 2024-08-03 RX ADMIN — MIDODRINE HYDROCHLORIDE 5 MG: 5 TABLET ORAL at 15:08

## 2024-08-03 RX ADMIN — METOPROLOL SUCCINATE 25 MG: 25 TABLET, EXTENDED RELEASE ORAL at 08:11

## 2024-08-03 RX ADMIN — MIRTAZAPINE 7.5 MG: 15 TABLET, FILM COATED ORAL at 21:32

## 2024-08-03 RX ADMIN — RIFAXIMIN 550 MG: 550 TABLET ORAL at 21:32

## 2024-08-03 RX ADMIN — EMPAGLIFLOZIN 10 MG: 10 TABLET, FILM COATED ORAL at 08:11

## 2024-08-03 RX ADMIN — SODIUM CHLORIDE, PRESERVATIVE FREE 10 ML: 5 INJECTION INTRAVENOUS at 08:12

## 2024-08-03 RX ADMIN — PANTOPRAZOLE SODIUM 40 MG: 40 TABLET, DELAYED RELEASE ORAL at 05:29

## 2024-08-03 RX ADMIN — OXYCODONE HYDROCHLORIDE 10 MG: 5 TABLET ORAL at 15:07

## 2024-08-03 RX ADMIN — BUSPIRONE HYDROCHLORIDE 5 MG: 5 TABLET ORAL at 21:32

## 2024-08-03 RX ADMIN — MIDODRINE HYDROCHLORIDE 5 MG: 5 TABLET ORAL at 11:46

## 2024-08-03 RX ADMIN — POTASSIUM CHLORIDE 40 MEQ: 1500 TABLET, EXTENDED RELEASE ORAL at 08:11

## 2024-08-03 RX ADMIN — Medication 100 MG: at 08:11

## 2024-08-03 RX ADMIN — MAGNESIUM SULFATE HEPTAHYDRATE 2000 MG: 40 INJECTION, SOLUTION INTRAVENOUS at 08:21

## 2024-08-03 RX ADMIN — BUSPIRONE HYDROCHLORIDE 5 MG: 5 TABLET ORAL at 13:50

## 2024-08-03 RX ADMIN — ASPIRIN 81 MG CHEWABLE TABLET 81 MG: 81 TABLET CHEWABLE at 08:11

## 2024-08-03 RX ADMIN — ISOSORBIDE DINITRATE 10 MG: 10 TABLET ORAL at 08:11

## 2024-08-03 ASSESSMENT — PAIN DESCRIPTION - DESCRIPTORS
DESCRIPTORS: ACHING
DESCRIPTORS: THROBBING

## 2024-08-03 ASSESSMENT — PAIN SCALES - GENERAL
PAINLEVEL_OUTOF10: 9
PAINLEVEL_OUTOF10: 8
PAINLEVEL_OUTOF10: 2
PAINLEVEL_OUTOF10: 6
PAINLEVEL_OUTOF10: 7

## 2024-08-03 ASSESSMENT — PAIN DESCRIPTION - ORIENTATION
ORIENTATION: LEFT
ORIENTATION: LEFT

## 2024-08-03 ASSESSMENT — PAIN DESCRIPTION - LOCATION
LOCATION: CHEST
LOCATION: CHEST

## 2024-08-03 ASSESSMENT — PAIN SCALES - WONG BAKER: WONGBAKER_NUMERICALRESPONSE: HURTS A LITTLE BIT

## 2024-08-03 NOTE — PROGRESS NOTES
Progress  Note  Chief Complaint   Patient presents with    Shortness of Breath     shortness of breath, patient feels like he is overloaded with fluids again he states     Historical Issues:  Current Facility-Administered Medications   Medication Dose Route Frequency Provider Last Rate Last Admin    ferric gluconate (FERRLECIT) 125 mg in sodium chloride 0.9 % 100 mL IVPB  125 mg IntraVENous Daily Rico Pelaez MD   Stopped at 08/03/24 1255    oxyCODONE (ROXICODONE) immediate release tablet 5 mg  5 mg Oral Q6H PRN Rico Pelaez MD   5 mg at 08/03/24 0947    bumetanide (BUMEX) injection 1 mg  1 mg IntraVENous BID Rico Pelaez MD   1 mg at 08/03/24 1146    0.9 % sodium chloride infusion   IntraVENous PRN Rico Pelaez MD        aspirin chewable tablet 81 mg  81 mg Oral Daily Rico Pelaez MD   81 mg at 08/03/24 0811    empagliflozin (JARDIANCE) tablet 10 mg  10 mg Oral Daily Rico Pelaez MD   10 mg at 08/03/24 0811    atorvastatin (LIPITOR) tablet 20 mg  20 mg Oral Nightly Rico Pelaez MD   20 mg at 08/02/24 2227    metoprolol succinate (TOPROL XL) extended release tablet 25 mg  25 mg Oral BID Rico Pelaez MD   25 mg at 08/03/24 0811    Vitamin D (CHOLECALCIFEROL) tablet 1,000 Units  1,000 Units Oral Daily Rico Pelaez MD   1,000 Units at 08/03/24 0811    pantoprazole (PROTONIX) tablet 40 mg  40 mg Oral BID AC Rico Pelaez MD   40 mg at 08/03/24 0529    lactulose (CHRONULAC) 10 GM/15ML solution 20 g  20 g Oral Daily Rico Pelaez MD   20 g at 08/03/24 0810    busPIRone (BUSPAR) tablet 5 mg  5 mg Oral TID Rico Pelaez MD   5 mg at 08/03/24 1350    folic acid (FOLVITE) tablet 1 mg  1 mg Oral Daily Rico Pelaez MD   1 mg at 08/03/24 0811    hydrALAZINE (APRESOLINE) tablet 10 mg  10 mg Oral 3 times per day Rico Pelaez MD   10 mg at 08/03/24 1350    isosorbide dinitrate (ISORDIL) tablet 10 mg  10 mg Oral TID Rico Pelaez MD   10 mg at 08/03/24 1350    midodrine (PROAMATINE) tablet 5 mg  5 mg  Oral TID WC Rico Pelaez MD   5 mg at 08/03/24 1146    mirtazapine (REMERON) tablet 7.5 mg  7.5 mg Oral Nightly Rico Pelaez MD   7.5 mg at 08/02/24 2227    nicotine (NICODERM CQ) 21 MG/24HR 1 patch  1 patch TransDERmal Daily Rico Pelaez MD   1 patch at 08/03/24 0813    thiamine tablet 100 mg  100 mg Oral Daily Rico Pelaez MD   100 mg at 08/03/24 0811    rifAXIMin (XIFAXAN) tablet 550 mg  550 mg Oral BID Rico Pelaez MD   550 mg at 08/03/24 0811    sodium chloride flush 0.9 % injection 5-40 mL  5-40 mL IntraVENous 2 times per day Rico Pelaez MD   10 mL at 08/03/24 0812    sodium chloride flush 0.9 % injection 5-40 mL  5-40 mL IntraVENous PRN Rico Pelaez MD        0.9 % sodium chloride infusion   IntraVENous PRN Rico Pelaez MD        potassium chloride (KLOR-CON M) extended release tablet 40 mEq  40 mEq Oral PRN Rico Pelaez MD        Or    potassium bicarb-citric acid (EFFER-K) effervescent tablet 40 mEq  40 mEq Oral PRRico Dominique MD        Or    potassium chloride 10 mEq/100 mL IVPB (Peripheral Line)  10 mEq IntraVENous PRRico Dominique MD        magnesium sulfate 2000 mg in 50 mL IVPB premix  2,000 mg IntraVENous PRN Rico Pelaez MD        polyethylene glycol (GLYCOLAX) packet 17 g  17 g Oral Daily PRN Rico Pelaez MD        prochlorperazine (COMPAZINE) tablet 10 mg  10 mg Oral Q8H PRRico Dominiqeu MD        Or    prochlorperazine (COMPAZINE) injection 10 mg  10 mg IntraVENous Q6H PRRico Dominique MD         Recent Complaints:  Review of Systems  Vitals:    08/03/24 1059   BP: 109/77   Pulse: 73   Resp: 20   Temp: 97.7 °F (36.5 °C)   SpO2:      Physical Exam  Vitals reviewed.   Cardiovascular:      Rate and Rhythm: Normal rate. Rhythm irregular.      Pulses:           Radial pulses are 2+ on the left side.   Pulmonary:      Breath sounds: Decreased air movement present. Examination of the right-middle field reveals rales. Examination of the left-middle field reveals rales.  Examination of the right-lower field reveals decreased breath sounds. Examination of the left-lower field reveals decreased breath sounds. Decreased breath sounds and rales present.   Chest:      Chest wall: Swelling (left chest) present.   Breasts:     Breasts are asymmetrical.      Left: Swelling present. No mass.   Musculoskeletal:      Right lower leg: Edema present.      Left lower leg: Edema present.   Lymphadenopathy:      Upper Body:      Left upper body: No supraclavicular, axillary or pectoral adenopathy.   Neurological:      Mental Status: He is alert.         Recent Labs     08/02/24  1235 08/03/24  0327    140   K 4.0 3.5   * 113*   CO2 20* 19*   BUN 20 17   CREATININE 1.8* 1.6*   GLUCOSE 98 97   CALCIUM 8.8 7.3*     Recent Labs     08/02/24  1235 08/02/24  2110 08/03/24  0327 08/03/24  1208   WBC 6.8  --  8.4 8.9   RBC 3.12*  --  3.42* 3.86   HGB 6.9* 7.9* 8.1* 8.9*   HCT 25.6* 28.9* 28.6* 32.5*   MCV 82.1  --  83.6 84.2   MCH 22.1*  --  23.7* 23.1*   MCHC 27.0*  --  28.3* 27.4*   RDW 18.2*  --  18.3* 18.5*     --   --  177   MPV 12.5*  --  12.9* 11.7           Principal Problem:    Anemia  Active Problems:    Acute blood loss anemia  Resolved Problems:    * No resolved hospital problems. *      Plan:  Iron deficiency confirmed on study  Hematology consult has been reviewed  Continue diuresis  Ferrlecit intravenously daily  Oral folic acid        Case Discussed with: cardiology      Electronically signed by Rico Pelaez MD on 8/3/2024 at 2:24 PM

## 2024-08-03 NOTE — PROGRESS NOTES
Dr Martinez notified of consult by Charge RN. Call returned. Spoke with MD. Reviewed medications and lab values at present time. All questions and concerns addressed

## 2024-08-03 NOTE — PROGRESS NOTES
Unable to send Cardiology consult through [facility] at this time. Dr. Harvey on unit and made aware of the consult.

## 2024-08-03 NOTE — CONSULTS
[facility]  Hematology/Oncology  Consult      Patient Name: Len Dorsey  YOB: 1965  PCP: Jama Skinner PA   Referring Provider:      Reason for Consultation:   Chief Complaint   Patient presents with    Shortness of Breath     shortness of breath, patient feels like he is overloaded with fluids again he states        History of Present Illness:  59-year-old man with past medical history relevant for coronary artery disease status post CABG, valvular heart disease status post mitral and aortic valve replacement x 2.  He has history of CHF, alcoholic liver disease, pacemaker placement and remote history of testicular cancer status post left orchiectomy in 1988 without evidence of disease recurrence.  He is hospitalized through ED with progressively worsening dyspnea with edema, swelling and weight gain.  He is very grumpy and angry and most of the history obtained from his EMR.  CMP on admission showed a serum creatinine of 1.6 and today it is 1.8.  Ammonia was slightly elevated at 70.  Hepatic panel with normal albumin with slightly elevated alk phos with normal transaminases and normal total bilirubin of 0.7.  proBNP was elevated at 1489 and high-sensitivity troponin was also elevated.  CBC on admission with hemoglobin of 6.9 with low MCV of 82 with normal WBC count and normal platelet count with WBC differential relevant for moderate lymphopenia.  B12 and folate levels were unremarkable and iron studies are consistent with iron deficiency anemia with low serum iron low ferritin and elevated TIBC and very low saturation.  Patient has been on anticoagulation with warfarin.  He is also on aspirin  Denies any melena.  INR on admission is supratherapeutic at 5.0 with a prothrombin time of 53 seconds.  He feels better following transfusion with 1 unit of packed RBC and his hemoglobin is up to 8.1  He has been initiated on folic acid as well as parenteral IV iron with  Ferrlecit.      Diagnostic Data:     Past Medical History:   Diagnosis Date    Alcoholic liver disease (HCC)     H/O [facility] 09/[facility]  Mitral and pacemaker at this time as well    History of mitral valve replacement 09/2020    at [facility]  Aortic Valve placed at this time as well as pacemaker    Stroke (HCC)     Testicular cancer (HCC)     in remission since 1988       Patient Active Problem List    Diagnosis Date Noted    Paravalvular leak (prosthetic valve) 02/08/2023    Hx of CABG 02/08/2023    Warfarin anticoagulation 02/08/2023    Subtherapeutic international normalized ratio (INR) 02/08/2023    Moderate obesity 02/08/2023    Dyspnea 02/08/2023    S/P MVR (mitral valve replacement) 02/06/2023    S/P AVR (aortic valve replacement) 02/06/2023    Coronary artery disease involving native coronary artery of native heart without angina pectoris 02/06/2023    Right upper quadrant abdominal pain 09/08/2022    Diarrhea 09/08/2022    Current moderate episode of major depressive disorder without prior episode ([facility]) 09/08/2022    Hypertension 09/08/2022    Polyp of colon 08/24/2022    Gastrointestinal hemorrhage 08/23/2022    Acute cholecystitis 08/22/2022    Acute chest pain 05/28/2022    Vitamin D deficiency 05/18/2022    Retroperitoneal hematoma 05/12/2022    Acute blood loss anemia 08/02/2024    Delirium 05/13/2024    Alcoholic liver disease, unspecified (HCC) 05/07/2024    Thrombocytopenia (HCC) 05/07/2024    Hyperlipidemia 03/03/2024    Hyperglycemia 03/01/2024    FRANTZ (acute kidney injury) (HCC) 02/29/2024    Acidosis 02/29/2024    Hypophosphatemia 02/28/2024    Acute congestive heart failure (HCC) 02/28/2024    Acute decompensated heart failure (HCC) 02/27/2024    Aortic valve disease 02/27/2024    Mitral valve disease 02/27/2024    Atrioventricular block 02/27/2024    Pure hypercholesterolemia 02/27/2024    Chronic obstructive pulmonary disease (HCC) 02/27/2024

## 2024-08-03 NOTE — PROGRESS NOTES
Pharmacy Consultation Note  (Warfarin Dosing and Monitoring)    Initial consult date: 8/2  Consulting Provider: Latanya Dorsey is a 59 y.o. male for whom pharmacy has been asked to manage warfarin therapy.     Weight:   Wt Readings from Last 1 Encounters:   08/02/24 113.4 kg (250 lb)       TSH:    Lab Results   Component Value Date/Time    TSH 0.95 05/08/2024 02:24 AM       Hepatic Function Panel:                            Lab Results   Component Value Date/Time    ALKPHOS 142 08/03/2024 03:27 AM    ALT 9 08/03/2024 03:27 AM    AST 16 08/03/2024 03:27 AM    BILITOT 1.1 08/03/2024 03:27 AM    BILIDIR 0.2 07/14/2023 05:43 PM    IBILI 0.2 07/14/2023 05:43 PM       Current significant warfarin drug-drug interactions include: No significant interactions    Recent Labs     08/02/24  1235 08/02/24  2110 08/03/24  0327 08/03/24  1208   HGB 6.9* 7.9* 8.1* 8.9*     --   --  177     Date Warfarin Dose INR Heparin or LMWH Comment   8/3 HOLD 5 Lovenox DC'd                                  Assessment:  Patient is a 59 y.o. male on warfarin for Atrial Fibrillation, Mechanical Heart Valve (mitral), and Mechanical Heart Valve (atrial).  Patient's home warfarin dosing regimen is 4mg daily per med rec but patient filled 3mg tabs most recently .   Goal INR 2.5 - 3.5  INR 5 today, DC'd lovenox for tonight, will monitor    Plan:  Warfarin ON HOLD tonight, possibly elevated in setting of liver dysfunction  Daily PT/INR until the INR is stable within the therapeutic range  Pharmacist will follow and monitor/adjust dosing as necessary    Thank you for this consult,    Nhung Todd, [facility] 8/3/2024 2:08 PM    ZHOU: [phone]  SEY: [phone]  SJW: [phone]

## 2024-08-03 NOTE — ED NOTES
ED to Inpatient Handoff Report    Notified Margo RN that electronic handoff available and patient ready for transport to room 0601.    Safety Risks: Risk of falls    Patient in Restraints: no    Constant Observer or Patient : no    Telemetry Monitoring Ordered :No           Order to transfer to unit without monitor:N/A    Last MEWS: 1 Time completed: 2116    Deterioration Index Score:   Predictive Model Details          20 (Normal)  Factor Value    Calculated 8/2/2024 21:17 57% Age 59 years old    Deterioration Index Model 14% Respiratory rate 18     11% Hematocrit abnormal (25.6 %)     8% Pulse 96     5% Systolic 126     3% Sodium 141 mmol/L     1% Potassium 4.0 mmol/L     1% Pulse oximetry 97 %     0% Temperature 98.1 °F (36.7 °C)     0% WBC count 6.8 k/uL        Vitals:    08/02/24 1901 08/02/24 1916 08/02/24 2046 08/02/24 2116   BP:    126/71   Pulse: 98 97 98 96   Resp:    18   Temp:    98.1 °F (36.7 °C)   TempSrc:    Oral   SpO2:  97%  97%   Weight:       Height:             Opportunity for questions and clarification was provided.

## 2024-08-03 NOTE — PROGRESS NOTES
4 Eyes Skin Assessment     NAME:  Len Dorsey  YOB: 1965  MEDICAL RECORD NUMBER:  [id number]    The patient is being assessed for  Admission    I agree that at least one RN has performed a thorough Head to Toe Skin Assessment on the patient. ALL assessment sites listed below have been assessed.      Areas assessed by both nurses:    Head, Face, Ears, Shoulders, Back, Chest, Arms, Elbows, Hands, Sacrum. Buttock, Coccyx, Ischium, Legs. Feet and Heels, and Under Medical Devices         Does the Patient have a Wound? No noted wound(s)       Jasvir Prevention initiated by RN: No  Wound Care Orders initiated by RN: No    Pressure Injury (Stage 3,4, Unstageable, DTI, NWPT, and Complex wounds) if present, place Wound referral order by RN under : No    New Ostomies, if present place, Ostomy referral order under : No     Nurse 1 eSignature: Electronically signed by Dayami Sanchez RN on 8/3/24 at 3:10 AM EDT    **SHARE this note so that the co-signing nurse can place an eSignature**    Nurse 2 eSignature: Electronically signed by Rajani Beck RN on 8/3/24 at 4:34 AM EDT

## 2024-08-04 ENCOUNTER — APPOINTMENT (OUTPATIENT)
Dept: ULTRASOUND IMAGING | Age: 59
DRG: 811 | End: 2024-08-04
Payer: MEDICARE

## 2024-08-04 LAB
ANION GAP SERPL CALCULATED.3IONS-SCNC: 11 MMOL/L (ref 7–16)
BASOPHILS # BLD: 0.35 K/UL (ref 0–0.2)
BASOPHILS NFR BLD: 4 % (ref 0–2)
BUN SERPL-MCNC: 24 MG/DL (ref 6–20)
CALCIUM SERPL-MCNC: 8.9 MG/DL (ref 8.6–10.2)
CHLORIDE SERPL-SCNC: 107 MMOL/L (ref 98–107)
CO2 SERPL-SCNC: 19 MMOL/L (ref 22–29)
CREAT SERPL-MCNC: 2.4 MG/DL (ref 0.7–1.2)
CREAT UR-MCNC: 142.2 MG/DL (ref 40–278)
CREAT UR-MCNC: 143.3 MG/DL (ref 40–278)
EKG ATRIAL RATE: 92 BPM
EKG P AXIS: 49 DEGREES
EKG Q-T INTERVAL: 414 MS
EKG QRS DURATION: 164 MS
EKG QTC CALCULATION (BAZETT): 511 MS
EKG R AXIS: -56 DEGREES
EKG T AXIS: 116 DEGREES
EKG VENTRICULAR RATE: 92 BPM
EOSINOPHIL # BLD: 0.18 K/UL (ref 0.05–0.5)
EOSINOPHILS RELATIVE PERCENT: 2 % (ref 0–6)
ERYTHROCYTE [DISTWIDTH] IN BLOOD BY AUTOMATED COUNT: 18.6 % (ref 11.5–15)
GFR, ESTIMATED: 31 ML/MIN/1.73M2
GLUCOSE SERPL-MCNC: 100 MG/DL (ref 74–99)
HCT VFR BLD AUTO: 32.6 % (ref 37–54)
HGB BLD-MCNC: 8.7 G/DL (ref 12.5–16.5)
INR PPP: 4
LYMPHOCYTES NFR BLD: 1.58 K/UL (ref 1.5–4)
LYMPHOCYTES RELATIVE PERCENT: 16 % (ref 20–42)
MAGNESIUM SERPL-MCNC: 2.3 MG/DL (ref 1.6–2.6)
MCH RBC QN AUTO: 22.8 PG (ref 26–35)
MCHC RBC AUTO-ENTMCNC: 26.7 G/DL (ref 32–34.5)
MCV RBC AUTO: 85.3 FL (ref 80–99.9)
MONOCYTES NFR BLD: 0.88 K/UL (ref 0.1–0.95)
MONOCYTES NFR BLD: 9 % (ref 2–12)
NEUTROPHILS NFR BLD: 70 % (ref 43–80)
NEUTS SEG NFR BLD: 7.11 K/UL (ref 1.8–7.3)
PLATELET # BLD AUTO: 177 K/UL (ref 130–450)
PMV BLD AUTO: 11.9 FL (ref 7–12)
POTASSIUM SERPL-SCNC: 4.8 MMOL/L (ref 3.5–5)
POTASSIUM, UR: 41.8 MMOL/L
PROTHROMBIN TIME: 43.9 SEC (ref 9.3–12.4)
RBC # BLD AUTO: 3.82 M/UL (ref 3.8–5.8)
RBC # BLD: ABNORMAL 10*6/UL
SODIUM SERPL-SCNC: 137 MMOL/L (ref 132–146)
SODIUM UR-SCNC: 38 MMOL/L
TOTAL PROTEIN, URINE: 90 MG/DL (ref 0–12)
URINE TOTAL PROTEIN CREATININE RATIO: 0.63 (ref 0–0.2)
WBC OTHER # BLD: 10.1 K/UL (ref 4.5–11.5)

## 2024-08-04 PROCEDURE — 6360000002 HC RX W HCPCS: Performed by: INTERNAL MEDICINE

## 2024-08-04 PROCEDURE — 85025 COMPLETE CBC W/AUTO DIFF WBC: CPT

## 2024-08-04 PROCEDURE — 93010 ELECTROCARDIOGRAM REPORT: CPT | Performed by: INTERNAL MEDICINE

## 2024-08-04 PROCEDURE — 99233 SBSQ HOSP IP/OBS HIGH 50: CPT | Performed by: INTERNAL MEDICINE

## 2024-08-04 PROCEDURE — 36415 COLL VENOUS BLD VENIPUNCTURE: CPT

## 2024-08-04 PROCEDURE — 93971 EXTREMITY STUDY: CPT

## 2024-08-04 PROCEDURE — 84300 ASSAY OF URINE SODIUM: CPT

## 2024-08-04 PROCEDURE — 2580000003 HC RX 258: Performed by: INTERNAL MEDICINE

## 2024-08-04 PROCEDURE — 80048 BASIC METABOLIC PNL TOTAL CA: CPT

## 2024-08-04 PROCEDURE — 85610 PROTHROMBIN TIME: CPT

## 2024-08-04 PROCEDURE — 2060000000 HC ICU INTERMEDIATE R&B

## 2024-08-04 PROCEDURE — 84156 ASSAY OF PROTEIN URINE: CPT

## 2024-08-04 PROCEDURE — 84133 ASSAY OF URINE POTASSIUM: CPT

## 2024-08-04 PROCEDURE — 82570 ASSAY OF URINE CREATININE: CPT

## 2024-08-04 PROCEDURE — 83735 ASSAY OF MAGNESIUM: CPT

## 2024-08-04 PROCEDURE — 6370000000 HC RX 637 (ALT 250 FOR IP): Performed by: INTERNAL MEDICINE

## 2024-08-04 PROCEDURE — 99232 SBSQ HOSP IP/OBS MODERATE 35: CPT | Performed by: INTERNAL MEDICINE

## 2024-08-04 RX ADMIN — METHOCARBAMOL TABLETS 750 MG: 750 TABLET, COATED ORAL at 22:46

## 2024-08-04 RX ADMIN — PANTOPRAZOLE SODIUM 40 MG: 40 TABLET, DELAYED RELEASE ORAL at 16:36

## 2024-08-04 RX ADMIN — RIFAXIMIN 550 MG: 550 TABLET ORAL at 20:13

## 2024-08-04 RX ADMIN — SODIUM CHLORIDE, PRESERVATIVE FREE 10 ML: 5 INJECTION INTRAVENOUS at 20:12

## 2024-08-04 RX ADMIN — ISOSORBIDE DINITRATE 10 MG: 10 TABLET ORAL at 07:55

## 2024-08-04 RX ADMIN — BUSPIRONE HYDROCHLORIDE 5 MG: 5 TABLET ORAL at 07:55

## 2024-08-04 RX ADMIN — METOPROLOL SUCCINATE 25 MG: 25 TABLET, EXTENDED RELEASE ORAL at 07:55

## 2024-08-04 RX ADMIN — RIFAXIMIN 550 MG: 550 TABLET ORAL at 07:55

## 2024-08-04 RX ADMIN — SODIUM CHLORIDE, PRESERVATIVE FREE 10 ML: 5 INJECTION INTRAVENOUS at 07:56

## 2024-08-04 RX ADMIN — ISOSORBIDE DINITRATE 10 MG: 10 TABLET ORAL at 20:12

## 2024-08-04 RX ADMIN — MIDODRINE HYDROCHLORIDE 5 MG: 5 TABLET ORAL at 16:35

## 2024-08-04 RX ADMIN — HYDRALAZINE HYDROCHLORIDE 10 MG: 10 TABLET ORAL at 05:17

## 2024-08-04 RX ADMIN — BUMETANIDE 0.5 MG/HR: 0.25 INJECTION INTRAMUSCULAR; INTRAVENOUS at 14:48

## 2024-08-04 RX ADMIN — METHOCARBAMOL TABLETS 750 MG: 750 TABLET, COATED ORAL at 16:36

## 2024-08-04 RX ADMIN — LACTULOSE 20 G: 10 SOLUTION ORAL at 07:56

## 2024-08-04 RX ADMIN — HYDRALAZINE HYDROCHLORIDE 10 MG: 10 TABLET ORAL at 14:53

## 2024-08-04 RX ADMIN — ATORVASTATIN CALCIUM 20 MG: 20 TABLET, FILM COATED ORAL at 20:12

## 2024-08-04 RX ADMIN — OXYCODONE HYDROCHLORIDE 5 MG: 5 TABLET ORAL at 06:40

## 2024-08-04 RX ADMIN — OXYCODONE HYDROCHLORIDE 10 MG: 5 TABLET ORAL at 22:46

## 2024-08-04 RX ADMIN — METHOCARBAMOL TABLETS 750 MG: 750 TABLET, COATED ORAL at 11:31

## 2024-08-04 RX ADMIN — MIRTAZAPINE 7.5 MG: 15 TABLET, FILM COATED ORAL at 20:12

## 2024-08-04 RX ADMIN — MIDODRINE HYDROCHLORIDE 5 MG: 5 TABLET ORAL at 11:31

## 2024-08-04 RX ADMIN — HYDRALAZINE HYDROCHLORIDE 10 MG: 10 TABLET ORAL at 22:46

## 2024-08-04 RX ADMIN — MIDODRINE HYDROCHLORIDE 5 MG: 5 TABLET ORAL at 07:55

## 2024-08-04 RX ADMIN — Medication 1000 UNITS: at 07:55

## 2024-08-04 RX ADMIN — Medication 100 MG: at 07:56

## 2024-08-04 RX ADMIN — SODIUM CHLORIDE 125 MG: 900 INJECTION INTRAVENOUS at 07:56

## 2024-08-04 RX ADMIN — BUSPIRONE HYDROCHLORIDE 5 MG: 5 TABLET ORAL at 14:53

## 2024-08-04 RX ADMIN — METHOCARBAMOL TABLETS 750 MG: 750 TABLET, COATED ORAL at 07:55

## 2024-08-04 RX ADMIN — PANTOPRAZOLE SODIUM 40 MG: 40 TABLET, DELAYED RELEASE ORAL at 05:17

## 2024-08-04 RX ADMIN — METOPROLOL SUCCINATE 25 MG: 25 TABLET, EXTENDED RELEASE ORAL at 20:12

## 2024-08-04 RX ADMIN — FOLIC ACID 1 MG: 1 TABLET ORAL at 07:55

## 2024-08-04 RX ADMIN — BUSPIRONE HYDROCHLORIDE 5 MG: 5 TABLET ORAL at 20:13

## 2024-08-04 RX ADMIN — OXYCODONE HYDROCHLORIDE 10 MG: 5 TABLET ORAL at 14:27

## 2024-08-04 RX ADMIN — ASPIRIN 81 MG CHEWABLE TABLET 81 MG: 81 TABLET CHEWABLE at 07:55

## 2024-08-04 RX ADMIN — ISOSORBIDE DINITRATE 10 MG: 10 TABLET ORAL at 14:53

## 2024-08-04 ASSESSMENT — PAIN SCALES - GENERAL
PAINLEVEL_OUTOF10: 5
PAINLEVEL_OUTOF10: 2
PAINLEVEL_OUTOF10: 7
PAINLEVEL_OUTOF10: 3

## 2024-08-04 ASSESSMENT — PAIN SCALES - WONG BAKER
WONGBAKER_NUMERICALRESPONSE: HURTS A LITTLE BIT
WONGBAKER_NUMERICALRESPONSE: HURTS A LITTLE BIT

## 2024-08-04 ASSESSMENT — PAIN DESCRIPTION - LOCATION: LOCATION: CHEST

## 2024-08-04 ASSESSMENT — PAIN DESCRIPTION - DESCRIPTORS: DESCRIPTORS: ACHING

## 2024-08-04 ASSESSMENT — PAIN DESCRIPTION - ORIENTATION: ORIENTATION: LEFT

## 2024-08-04 NOTE — PROGRESS NOTES
Progress  Note  Chief Complaint   Patient presents with    Shortness of Breath     shortness of breath, patient feels like he is overloaded with fluids again he states     Historical Issues:  Current Facility-Administered Medications   Medication Dose Route Frequency Provider Last Rate Last Admin    warfarin placeholder: dosing by pharmacy   Other RX Placeholder Rico Pelaez MD        bumetanide (BUMEX) 12.5 mg in sodium chloride 0.9 % 125 mL infusion  0.5 mg/hr IntraVENous Continuous Elena Douglas APRN - NP        ferric gluconate (FERRLECIT) 125 mg in sodium chloride 0.9 % 100 mL IVPB  125 mg IntraVENous Daily Rico Pelaez MD   Stopped at 08/04/24 0900    oxyCODONE (ROXICODONE) immediate release tablet 5 mg  5 mg Oral Q6H PRN Rico Pelaez MD   5 mg at 08/04/24 0640    [Held by provider] bumetanide (BUMEX) injection 1 mg  1 mg IntraVENous BID Rico Pelaez MD   1 mg at 08/03/24 1647    methocarbamol (ROBAXIN) tablet 750 mg  750 mg Oral 4x Daily Rico Pelaez MD   750 mg at 08/04/24 1131    oxyCODONE (ROXICODONE) immediate release tablet 10 mg  10 mg Oral Q6H PRN Rico Pelaez MD   10 mg at 08/03/24 2315    0.9 % sodium chloride infusion   IntraVENous PRN Rico Pelaez MD        aspirin chewable tablet 81 mg  81 mg Oral Daily Rico Pelaez MD   81 mg at 08/04/24 0755    [Held by provider] empagliflozin (JARDIANCE) tablet 10 mg  10 mg Oral Daily Rico Pelaez MD   10 mg at 08/03/24 0811    atorvastatin (LIPITOR) tablet 20 mg  20 mg Oral Nightly Rico Pelaez MD   20 mg at 08/03/24 2132    metoprolol succinate (TOPROL XL) extended release tablet 25 mg  25 mg Oral BID Rico Pelaez MD   25 mg at 08/04/24 0755    Vitamin D (CHOLECALCIFEROL) tablet 1,000 Units  1,000 Units Oral Daily Rico Pelaez MD   1,000 Units at 08/04/24 0755    pantoprazole (PROTONIX) tablet 40 mg  40 mg Oral BID AC Latanya Rico W, MD   40 mg at 08/04/24 0517    lactulose (CHRONULAC) 10 GM/15ML solution 20 g  20 g Oral Daily  Rico Pelaez MD   20 g at 08/04/24 0756    busPIRone (BUSPAR) tablet 5 mg  5 mg Oral TID Rico Pelaez MD   5 mg at 08/04/24 0755    folic acid (FOLVITE) tablet 1 mg  1 mg Oral Daily Rico Pelaez MD   1 mg at 08/04/24 0755    hydrALAZINE (APRESOLINE) tablet 10 mg  10 mg Oral 3 times per day Rico Pelaez MD   10 mg at 08/04/24 0517    isosorbide dinitrate (ISORDIL) tablet 10 mg  10 mg Oral TID Rico Pelaez MD   10 mg at 08/04/24 0755    midodrine (PROAMATINE) tablet 5 mg  5 mg Oral TID WC Rico Pelaez MD   5 mg at 08/04/24 1131    mirtazapine (REMERON) tablet 7.5 mg  7.5 mg Oral Nightly Rico Pelaez MD   7.5 mg at 08/03/24 2132    nicotine (NICODERM CQ) 21 MG/24HR 1 patch  1 patch TransDERmal Daily Rico Pelaez MD   1 patch at 08/04/24 0841    thiamine tablet 100 mg  100 mg Oral Daily Rico Pelaez MD   100 mg at 08/04/24 0756    rifAXIMin (XIFAXAN) tablet 550 mg  550 mg Oral BID Rico Pelaez MD   550 mg at 08/04/24 0755    sodium chloride flush 0.9 % injection 5-40 mL  5-40 mL IntraVENous 2 times per day Rico Pelaez MD   10 mL at 08/04/24 0756    sodium chloride flush 0.9 % injection 5-40 mL  5-40 mL IntraVENous PRN Rico Pelaez MD        0.9 % sodium chloride infusion   IntraVENous PRN Rico Pelaez MD        potassium chloride (KLOR-CON M) extended release tablet 40 mEq  40 mEq Oral PRN Rico Pelaez MD        Or    potassium bicarb-citric acid (EFFER-K) effervescent tablet 40 mEq  40 mEq Oral PRN Rico Pelaez MD        Or    potassium chloride 10 mEq/100 mL IVPB (Peripheral Line)  10 mEq IntraVENous PRN Rico Pelaez MD        magnesium sulfate 2000 mg in 50 mL IVPB premix  2,000 mg IntraVENous PRN Rico Pelaez MD        polyethylene glycol (GLYCOLAX) packet 17 g  17 g Oral Daily PRN Rico Pelaez MD        prochlorperazine (COMPAZINE) tablet 10 mg  10 mg Oral Q8H PRN Rico Pelaez MD        Or    prochlorperazine (COMPAZINE) injection 10 mg  10 mg IntraVENous Q6H PRN Rico Pelaez

## 2024-08-04 NOTE — PROGRESS NOTES
Pharmacy Consultation Note  (Warfarin Dosing and Monitoring)    Initial consult date: 8/2  Consulting Provider: Latanya Dorsey is a 59 y.o. male for whom pharmacy has been asked to manage warfarin therapy.     Weight:   Wt Readings from Last 1 Encounters:   08/04/24 115.8 kg (255 lb 6.4 oz)       TSH:    Lab Results   Component Value Date/Time    TSH 0.95 05/08/2024 02:24 AM       Hepatic Function Panel:                            Lab Results   Component Value Date/Time    ALKPHOS 142 08/03/2024 03:27 AM    ALT 9 08/03/2024 03:27 AM    AST 16 08/03/2024 03:27 AM    BILITOT 1.1 08/03/2024 03:27 AM    BILIDIR 0.2 07/14/2023 05:43 PM    IBILI 0.2 07/14/2023 05:43 PM       Current significant warfarin drug-drug interactions include: No significant interactions    Recent Labs     08/02/24  1235 08/02/24  2110 08/03/24  0327 08/03/24  1208 08/04/24  0524   HGB 6.9*   < > 8.1* 8.9* 8.7*     --   --  177 177    < > = values in this interval not displayed.       Date Warfarin Dose INR Heparin or LMWH Comment   8/3 HOLD 5 Lovenox DC'd    8/4 HOLD 4 --                           Assessment:  Patient is a 59 y.o. male on warfarin for Atrial Fibrillation, Mechanical Heart Valve (mitral), and Mechanical Heart Valve (atrial).  Patient's home warfarin dosing regimen is 4mg daily per med rec but patient filled 3mg tabs most recently.   Goal INR 2.5 - 3.5  INR 4 today    Plan:  Warfarin ON HOLD tonight, possibly elevated in setting of liver dysfunction  Daily PT/INR until the INR is stable within the therapeutic range  Pharmacist will follow and monitor/adjust dosing as necessary    Thank you for this consult,    Merari Van, PharmD, BCPS, BCCCP 8/4/2024 10:33 AM

## 2024-08-04 NOTE — CONSULTS
Department of Internal Medicine  Nephrology Nurse Practitioner Consult Note      Reason for Consult:  FRANTZ  Requesting Physician:  Dr Jeffry Kirby     CHIEF COMPLAINT:  shortness of breath, BLE edema     History Obtained From:  patient, electronic medical record    HISTORY OF PRESENT ILLNESS:  Briefly Mr. Dorsey is a 59-year-old man with history of CKD stage IIIa, with large proteinuria, probably due to nephrosclerosis, baseline creatinine 1.5-1.7 mg/dL, HTN, CAD status post CABG, HFpEF 55%, valvular heart disease status post AVR and MVR both mechanical valves, post pacemaker placement, hyperlipidemia, CVA, testicular cancer status postchemotherapy, alcohol abuse, fatty liver, who was admitted on 8/2/2024 for shortness of breath and bilateral lower extremity edema. Creatinine on admission 1.8 mg/dL has increased to 2.4 mg/dL, reason for this consultation. Home medications include folic acid, hydralazine, isosorbide, midodrine, rifaximin, lactulose, empagliflozin, metoprolol, cholecalciferol, atorvastatin.     Past Medical History:        Diagnosis Date    Alcoholic liver disease (HCC)     H/O prosthetic aortic valve replacement 09/2020    [facility] - main  Mitral and pacemaker at this time as well    History of mitral valve replacement 09/2020    at [facility] - main  Aortic Valve placed at this time as well as pacemaker    Stroke (HCC)     Testicular cancer (HCC)     in remission since 1988     Past Surgical History:        Procedure Laterality Date    COLONOSCOPY N/A 8/24/2022    COLONOSCOPY POLYPECTOMY HOT BIOPSY performed by Rene Lorenzo MD at [facility] ENDOSCOPY    FRACTURE SURGERY      Left Tibial Plateau Fracture 3/29/2022     PACEMAKER PLACEMENT  09/2020    UPPER GASTROINTESTINAL ENDOSCOPY N/A 8/24/2022    EGD DIAGNOSTIC ONLY performed by Rene Lorenzo MD at [facility] ENDOSCOPY    UPPER GASTROINTESTINAL ENDOSCOPY N/A 5/9/2024    ESOPHAGOGASTRODUODENOSCOPY          +++AVAIL 1430+++ performed by Min Mcdaniels DO

## 2024-08-04 NOTE — PROGRESS NOTES
INPATIENT CARDIOLOGY FOLLOW-UP    Name: Len Dorsey    Age: 59 y.o.    Date of Admission: 8/2/2024 12:11 PM    Date of Service: 8/4/2024    Primary Cardiologist: Akash    Chief Complaint: Follow-up for CHF    Interim History:  Still short of breath, still with edema, in the legs more so on the right, and in the left breast area.    Net -1.1 L.  Creatinine uptrending    Review of Systems:   Negative except as described above    Problem List:  Patient Active Problem List   Diagnosis    Chest pain    Closed fracture of nasal bones    Injury of face    Retroperitoneal hematoma    Vitamin D deficiency    Acute chest pain    Acute cholecystitis    Gastrointestinal hemorrhage    Polyp of colon    Right upper quadrant abdominal pain    Diarrhea    Current moderate episode of major depressive disorder without prior episode (HCC)    Hypertension    H/O mitral valve replacement with mechanical valve    H/O mechanical aortic valve replacement    Coronary artery disease involving native coronary artery of native heart without angina pectoris    Paravalvular leak (prosthetic valve)    Hx of CABG    Warfarin anticoagulation    Supratherapeutic INR    Moderate obesity    Dyspnea    Alcohol withdrawal syndrome without complication (HCC)    DTs (delirium tremens) (HCC)    Anemia    Severe protein-calorie malnutrition (HCC)    Acute decompensated heart failure (HCC)    Aortic valve disease    Mitral valve disease    AV block    Pure hypercholesterolemia    Chronic obstructive pulmonary disease (HCC)    Loculated pleural effusion    Troponin level elevated    Stage 2 chronic kidney disease    Alcohol use    Breast swelling    Pleural effusion    Hypophosphatemia    Acute congestive heart failure (HCC)    FRANTZ (acute kidney injury) (HCC)    Acidosis    Hyperglycemia    Hyperlipidemia    Alcoholic liver disease, unspecified (HCC)    Thrombocytopenia (HCC)    Delirium    Acute blood loss anemia    Heart failure with mid-range

## 2024-08-05 PROBLEM — K74.60 HEPATIC CIRRHOSIS (HCC): Status: ACTIVE | Noted: 2024-08-05

## 2024-08-05 PROBLEM — I50.23 ACUTE ON CHRONIC SYSTOLIC (CONGESTIVE) HEART FAILURE (HCC): Status: ACTIVE | Noted: 2024-08-05

## 2024-08-05 LAB
ANION GAP SERPL CALCULATED.3IONS-SCNC: 15 MMOL/L (ref 7–16)
BASOPHILS # BLD: 0.17 K/UL (ref 0–0.2)
BASOPHILS NFR BLD: 2 % (ref 0–2)
BUN SERPL-MCNC: 34 MG/DL (ref 6–20)
CALCIUM SERPL-MCNC: 9.1 MG/DL (ref 8.6–10.2)
CHLORIDE SERPL-SCNC: 105 MMOL/L (ref 98–107)
CO2 SERPL-SCNC: 19 MMOL/L (ref 22–29)
CREAT SERPL-MCNC: 2.9 MG/DL (ref 0.7–1.2)
EOSINOPHIL # BLD: 0.25 K/UL (ref 0.05–0.5)
EOSINOPHILS RELATIVE PERCENT: 3 % (ref 0–6)
ERYTHROCYTE [DISTWIDTH] IN BLOOD BY AUTOMATED COUNT: 19.4 % (ref 11.5–15)
GFR, ESTIMATED: 24 ML/MIN/1.73M2
GLUCOSE SERPL-MCNC: 90 MG/DL (ref 74–99)
HCT VFR BLD AUTO: 32.4 % (ref 37–54)
HGB BLD-MCNC: 8.9 G/DL (ref 12.5–16.5)
INR PPP: 3.1
LYMPHOCYTES NFR BLD: 0.85 K/UL (ref 1.5–4)
LYMPHOCYTES RELATIVE PERCENT: 9 % (ref 20–42)
MAGNESIUM SERPL-MCNC: 2.1 MG/DL (ref 1.6–2.6)
MCH RBC QN AUTO: 23.1 PG (ref 26–35)
MCHC RBC AUTO-ENTMCNC: 27.5 G/DL (ref 32–34.5)
MCV RBC AUTO: 83.9 FL (ref 80–99.9)
MONOCYTES NFR BLD: 0.76 K/UL (ref 0.1–0.95)
MONOCYTES NFR BLD: 8 % (ref 2–12)
NEUTROPHILS NFR BLD: 79 % (ref 43–80)
NEUTS SEG NFR BLD: 7.56 K/UL (ref 1.8–7.3)
PATH REV BLD -IMP: NORMAL
PLATELET # BLD AUTO: 183 K/UL (ref 130–450)
PMV BLD AUTO: 11.7 FL (ref 7–12)
POTASSIUM SERPL-SCNC: 4.4 MMOL/L (ref 3.5–5)
PROTHROMBIN TIME: 34.1 SEC (ref 9.3–12.4)
RBC # BLD AUTO: 3.86 M/UL (ref 3.8–5.8)
RBC # BLD: ABNORMAL 10*6/UL
SODIUM SERPL-SCNC: 139 MMOL/L (ref 132–146)
WBC OTHER # BLD: 9.6 K/UL (ref 4.5–11.5)

## 2024-08-05 PROCEDURE — 80048 BASIC METABOLIC PNL TOTAL CA: CPT

## 2024-08-05 PROCEDURE — 85025 COMPLETE CBC W/AUTO DIFF WBC: CPT

## 2024-08-05 PROCEDURE — 2060000000 HC ICU INTERMEDIATE R&B

## 2024-08-05 PROCEDURE — 99232 SBSQ HOSP IP/OBS MODERATE 35: CPT | Performed by: INTERNAL MEDICINE

## 2024-08-05 PROCEDURE — 6360000002 HC RX W HCPCS: Performed by: INTERNAL MEDICINE

## 2024-08-05 PROCEDURE — 36415 COLL VENOUS BLD VENIPUNCTURE: CPT

## 2024-08-05 PROCEDURE — 85610 PROTHROMBIN TIME: CPT

## 2024-08-05 PROCEDURE — 6370000000 HC RX 637 (ALT 250 FOR IP): Performed by: INTERNAL MEDICINE

## 2024-08-05 PROCEDURE — 2580000003 HC RX 258: Performed by: INTERNAL MEDICINE

## 2024-08-05 PROCEDURE — 83735 ASSAY OF MAGNESIUM: CPT

## 2024-08-05 PROCEDURE — 99233 SBSQ HOSP IP/OBS HIGH 50: CPT | Performed by: INTERNAL MEDICINE

## 2024-08-05 RX ORDER — WARFARIN SODIUM 4 MG/1
4 TABLET ORAL
Status: COMPLETED | OUTPATIENT
Start: 2024-08-05 | End: 2024-08-05

## 2024-08-05 RX ORDER — METOLAZONE 5 MG/1
5 TABLET ORAL DAILY
Status: DISCONTINUED | OUTPATIENT
Start: 2024-08-05 | End: 2024-08-17 | Stop reason: HOSPADM

## 2024-08-05 RX ADMIN — PANTOPRAZOLE SODIUM 40 MG: 40 TABLET, DELAYED RELEASE ORAL at 05:27

## 2024-08-05 RX ADMIN — MIRTAZAPINE 7.5 MG: 15 TABLET, FILM COATED ORAL at 21:02

## 2024-08-05 RX ADMIN — METHOCARBAMOL TABLETS 750 MG: 750 TABLET, COATED ORAL at 18:24

## 2024-08-05 RX ADMIN — MIDODRINE HYDROCHLORIDE 5 MG: 5 TABLET ORAL at 18:24

## 2024-08-05 RX ADMIN — LACTULOSE 20 G: 10 SOLUTION ORAL at 08:32

## 2024-08-05 RX ADMIN — METHOCARBAMOL TABLETS 750 MG: 750 TABLET, COATED ORAL at 13:29

## 2024-08-05 RX ADMIN — BUMETANIDE 0.5 MG/HR: 0.25 INJECTION INTRAMUSCULAR; INTRAVENOUS at 18:23

## 2024-08-05 RX ADMIN — ATORVASTATIN CALCIUM 20 MG: 20 TABLET, FILM COATED ORAL at 21:02

## 2024-08-05 RX ADMIN — RIFAXIMIN 550 MG: 550 TABLET ORAL at 21:03

## 2024-08-05 RX ADMIN — ISOSORBIDE DINITRATE 10 MG: 10 TABLET ORAL at 08:30

## 2024-08-05 RX ADMIN — BUSPIRONE HYDROCHLORIDE 5 MG: 5 TABLET ORAL at 13:38

## 2024-08-05 RX ADMIN — Medication 100 MG: at 08:30

## 2024-08-05 RX ADMIN — WARFARIN SODIUM 4 MG: 4 TABLET ORAL at 18:23

## 2024-08-05 RX ADMIN — METHOCARBAMOL TABLETS 750 MG: 750 TABLET, COATED ORAL at 21:02

## 2024-08-05 RX ADMIN — ISOSORBIDE DINITRATE 10 MG: 10 TABLET ORAL at 13:29

## 2024-08-05 RX ADMIN — METOPROLOL SUCCINATE 25 MG: 25 TABLET, EXTENDED RELEASE ORAL at 08:30

## 2024-08-05 RX ADMIN — BUSPIRONE HYDROCHLORIDE 5 MG: 5 TABLET ORAL at 21:03

## 2024-08-05 RX ADMIN — PANTOPRAZOLE SODIUM 40 MG: 40 TABLET, DELAYED RELEASE ORAL at 18:24

## 2024-08-05 RX ADMIN — HYDRALAZINE HYDROCHLORIDE 10 MG: 10 TABLET ORAL at 05:27

## 2024-08-05 RX ADMIN — ISOSORBIDE DINITRATE 10 MG: 10 TABLET ORAL at 21:02

## 2024-08-05 RX ADMIN — BUSPIRONE HYDROCHLORIDE 5 MG: 5 TABLET ORAL at 08:33

## 2024-08-05 RX ADMIN — RIFAXIMIN 550 MG: 550 TABLET ORAL at 08:30

## 2024-08-05 RX ADMIN — MIDODRINE HYDROCHLORIDE 5 MG: 5 TABLET ORAL at 13:29

## 2024-08-05 RX ADMIN — HYDRALAZINE HYDROCHLORIDE 10 MG: 10 TABLET ORAL at 13:38

## 2024-08-05 RX ADMIN — SODIUM CHLORIDE 125 MG: 900 INJECTION INTRAVENOUS at 11:36

## 2024-08-05 RX ADMIN — METHOCARBAMOL TABLETS 750 MG: 750 TABLET, COATED ORAL at 08:30

## 2024-08-05 RX ADMIN — METOPROLOL SUCCINATE 25 MG: 25 TABLET, EXTENDED RELEASE ORAL at 21:02

## 2024-08-05 RX ADMIN — METOLAZONE 5 MG: 5 TABLET ORAL at 18:23

## 2024-08-05 RX ADMIN — Medication 1000 UNITS: at 08:30

## 2024-08-05 RX ADMIN — MIDODRINE HYDROCHLORIDE 5 MG: 5 TABLET ORAL at 08:30

## 2024-08-05 RX ADMIN — SODIUM CHLORIDE, PRESERVATIVE FREE 10 ML: 5 INJECTION INTRAVENOUS at 21:10

## 2024-08-05 RX ADMIN — ASPIRIN 81 MG CHEWABLE TABLET 81 MG: 81 TABLET CHEWABLE at 08:29

## 2024-08-05 RX ADMIN — FOLIC ACID 1 MG: 1 TABLET ORAL at 08:30

## 2024-08-05 ASSESSMENT — PAIN - FUNCTIONAL ASSESSMENT: PAIN_FUNCTIONAL_ASSESSMENT: PREVENTS OR INTERFERES SOME ACTIVE ACTIVITIES AND ADLS

## 2024-08-05 ASSESSMENT — PAIN DESCRIPTION - LOCATION
LOCATION: CHEST
LOCATION: CHEST

## 2024-08-05 ASSESSMENT — PAIN DESCRIPTION - DESCRIPTORS
DESCRIPTORS: THROBBING
DESCRIPTORS: ACHING

## 2024-08-05 ASSESSMENT — PAIN SCALES - GENERAL
PAINLEVEL_OUTOF10: 7
PAINLEVEL_OUTOF10: 7

## 2024-08-05 ASSESSMENT — PAIN DESCRIPTION - ORIENTATION
ORIENTATION: LEFT
ORIENTATION: LEFT

## 2024-08-05 NOTE — CARE COORDINATION
Social work / Discharge planning:          Patient is independent from home.     Having difficulty paying for medications.    Referral made to Public Benefits to assess if patient is eligible for any financial assistance.    Awaiting update.    Electronically signed by ANJEL Hernandez on 8/5/2024 at 10:08 AM

## 2024-08-05 NOTE — CONSULTS
[facility]   Inpatient CHF Nurse Navigator Consult      Cardiologist: Dr Akash Dorsey is a 59 y.o. (1965) male with a history of HFrEF, most recent EF:  Lab Results   Component Value Date    LVEF 50 07/17/2023     05/10/2024 EF 40-45%    Patient was awake and alert, laying in bed during the consultation and is agreeable to heart failure education. He was engaged and asked appropriate questions throughout the education session. He has had multiple no show appointments in the CHF clinic. I will schedule him for follow ups again and I explained the importance of follow up care.     Barriers identified during consult contributing to HF Hospitalization:  [] Limited medication adherence   [] Poor health literacy, education regarding HF medications provided   [] Pill box provided to patient  [] Difficulty affording medications  [] Difficulty obtaining/ managing medications  [] Prescription assistance information given     [x] Not weighing themselves daily  [x] Weight log provided for easy monitoring  [] Scale provided     [x] Not following low sodium diet  [] Food insecurity   [x] 2 gram sodium diet education provided   [] Low sodium recipes provided  [] Sodium free seasoning provided   [] Low sodium meal delivery options given to patient  [] Dietician consulted     [] Lack of transportation to appointments     [] Depression, given chronic illness  [] Primary team notified     [] Goals of care need addressed  [] Palliative care consulted     [] CHF CHW consulted, to assist with     Chart Reviewed:  Diet: ADULT DIET; Regular; Low Fat/Low Chol/High Fiber/2 gm Na; 2000 ml   Daily Weights: Patient Vitals for the past 96 hrs (Last 3 readings):   Weight   08/04/24 0330 115.8 kg (255 lb 6.4 oz)   08/02/24 1207 113.4 kg (250 lb)     I/O:   Intake/Output Summary (Last 24 hours) at 8/5/2024 1005  Last data filed at 8/5/2024 0742  Gross per 24 hour   Intake 604.56 ml   Output

## 2024-08-05 NOTE — PLAN OF CARE
Problem: ABCDS Injury Assessment  Goal: Absence of physical injury  Outcome: Progressing     Problem: Discharge Planning  Goal: Discharge to home or other facility with appropriate resources  Outcome: Progressing     Problem: Safety - Adult  Goal: Free from fall injury  Outcome: Progressing     Problem: Pain  Goal: Verbalizes/displays adequate comfort level or baseline comfort level  Outcome: Progressing     Problem: Skin/Tissue Integrity  Goal: Absence of new skin breakdown  Description: 1.  Monitor for areas of redness and/or skin breakdown  2.  Assess vascular access sites hourly  3.  Every 4-6 hours minimum:  Change oxygen saturation probe site  4.  Every 4-6 hours:  If on nasal continuous positive airway pressure, respiratory therapy assess nares and determine need for appliance change or resting period.  Outcome: Progressing

## 2024-08-05 NOTE — PROGRESS NOTES
[facility] Cardiology Inpatient Progress Note    Patient is a 59 y.o. male of Jama Skinner PA seen in hospital follow up.     Chief complaint: CHF    HPI: Some SOB. No CP.     Patient Active Problem List   Diagnosis    Chest pain    Closed fracture of nasal bones    Injury of face    Retroperitoneal hematoma    Vitamin D deficiency    Acute chest pain    Acute cholecystitis    Gastrointestinal hemorrhage    Polyp of colon    Right upper quadrant abdominal pain    Diarrhea    Current moderate episode of major depressive disorder without prior episode (HCC)    Hypertension    H/O mitral valve replacement with mechanical valve    H/O mechanical aortic valve replacement    Coronary artery disease involving native coronary artery of native heart without angina pectoris    Paravalvular leak (prosthetic valve)    Hx of CABG    Warfarin anticoagulation    Supratherapeutic INR    Moderate obesity    Dyspnea    Alcohol withdrawal syndrome without complication (HCC)    DTs (delirium tremens) (HCC)    Anemia    Severe protein-calorie malnutrition (HCC)    Acute decompensated heart failure (HCC)    Aortic valve disease    Mitral valve disease    AV block    Pure hypercholesterolemia    Chronic obstructive pulmonary disease (HCC)    Loculated pleural effusion    Troponin level elevated    Stage 2 chronic kidney disease    Alcohol use    Breast swelling    Pleural effusion    Hypophosphatemia    Acute congestive heart failure (HCC)    FRANTZ (acute kidney injury) (HCC)    Acidosis    Hyperglycemia    Hyperlipidemia    Alcoholic liver disease, unspecified (HCC)    Thrombocytopenia (HCC)    Delirium    Acute blood loss anemia    Heart failure with mid-range ejection fraction (HFmEF) (HCC)    Acute on chronic combined systolic and diastolic congestive heart failure (HCC)    Normally functioning cardiac pacemaker present       No Known Allergies    Current Facility-Administered Medications   Medication Dose Route Frequency Provider  AM    BUN 34 08/05/2024 07:30 AM    CREATININE 2.9 08/05/2024 07:30 AM    CALCIUM 9.1 08/05/2024 07:30 AM    GFRAA >60 09/15/2022 03:08 PM    LABGLOM 24 08/05/2024 07:30 AM    LABGLOM 31 03/05/2024 05:57 AM     Magnesium:    Lab Results   Component Value Date/Time    MG 2.1 08/05/2024 07:30 AM     Cardiac Enzymes:   Lab Results   Component Value Date    TROPHS 42 (H) 08/02/2024    TROPHS 45 (H) 08/02/2024    TROPHS 38 (H) 05/09/2024      PT/INR:    Lab Results   Component Value Date/Time    PROTIME 34.1 08/05/2024 07:30 AM    PROTIME 96.0 09/15/2022 01:07 PM    INR 3.1 08/05/2024 07:30 AM     TSH:    Lab Results   Component Value Date/Time    TSH 0.95 05/08/2024 02:24 AM     [facility] CONCLUSIONS 3/2023:  1. Single vessel disease.  2. Patent LIMA to LAD.     TTE 5/10/24 KA:    Left Ventricle: The EF by visual approximation is 40 - 45%. Findings consistent with moderate concentric hypertrophy. Elevated left ventricular filling pressure.    Right Ventricle: Right ventricle size is normal. Reduced systolic function.    Aortic Valve: Not well visualized. Mechanical valve. AV mean gradient is 12 mmHg. LVOT:AV VTI Index is 0.34. AV area by continuity VTI is 1.1 cm2. AV area by peak velocity is 1.0 cm2.    Mitral Valve: Not well visualized. Mechanical valve. MV mean gradient is 4 mmHg.    Tricuspid Valve: Moderate regurgitation.    Pulmonic Valve: The pulmonic valve was not well visualized.    Left Atrium: Left atrium is severely dilated.    Image quality is technically difficult. Contrast used: Definity. Technically difficult study with poor endocardial visualization, technically difficult study due to patient's body habitus and procedure performed with the patient in a supine position.    ASSESSMENT & PLAN:    Patient Active Problem List   Diagnosis    Chest pain    Closed fracture of nasal bones    Injury of face    Retroperitoneal hematoma    Vitamin D deficiency    Acute chest pain    Acute cholecystitis    Gastrointestinal  hemorrhage    Polyp of colon    Right upper quadrant abdominal pain    Diarrhea    Current moderate episode of major depressive disorder without prior episode (HCC)    Hypertension    H/O mitral valve replacement with mechanical valve    H/O mechanical aortic valve replacement    Coronary artery disease involving native coronary artery of native heart without angina pectoris    Paravalvular leak (prosthetic valve)    Hx of CABG    Warfarin anticoagulation    Supratherapeutic INR    Moderate obesity    Dyspnea    Alcohol withdrawal syndrome without complication (HCC)    DTs (delirium tremens) (HCC)    Anemia    Severe protein-calorie malnutrition (HCC)    Acute decompensated heart failure (HCC)    Aortic valve disease    Mitral valve disease    AV block    Pure hypercholesterolemia    Chronic obstructive pulmonary disease (HCC)    Loculated pleural effusion    Troponin level elevated    Stage 2 chronic kidney disease    Alcohol use    Breast swelling    Pleural effusion    Hypophosphatemia    Acute congestive heart failure (HCC)    FRANTZ (acute kidney injury) (HCC)    Acidosis    Hyperglycemia    Hyperlipidemia    Alcoholic liver disease, unspecified (HCC)    Thrombocytopenia (HCC)    Delirium    Acute blood loss anemia    Heart failure with mid-range ejection fraction (HFmEF) (HCC)    Acute on chronic combined systolic and diastolic congestive heart failure (HCC)    Normally functioning cardiac pacemaker present     1. Acute on chronic heart failure:    EF 40-45% by 5/2024 echo.     BB/hydralazine/nitrate. D/c empagliflozin due to worsening renal function.     On IV bumex.     Renal consulted.     2. VHD: Redo mechanical AVR/MVR 3/2023 2/2 endocarditis with perivalvular mitral regurgitation. Mod TR.     INR 3.1 today.     3. CAD:    LIMA to LAD original AVR/MVR surgery 2020.    4. Pacer: [employer].     5. Acute on CKD: Follow labs.     6. Anemia: Follow labs.     7. HTN: Observe.     8. Lipids: Statin.     9.  Left-sided tender gynecomastia etiology unclear: Per primary service.     10. COPD: Per primary service.     11. Tobacco abuse    12. Alcoholic liver disease    13. Obesity     14. Noncompliance/social/financial/transportation issues    Available external charts reviewed.   Available imaging and evaluations independently reviewed.   Interviewed and discussed patient with available family.  Discussed case with referring service and non-cardiology consultants.     Greater than 50 minutes was spent counseling the patient, reviewing the rationale for the above recommendations and reviewing the patient's current medication list, problem list and results of all previously ordered testing.    Melba Mcmanus D.O.  Cardiologist  Cardiology, [facility]

## 2024-08-05 NOTE — PROGRESS NOTES
[facility] Hospitalist   Progress Note    Admitting Date and Time: 8/2/2024 12:11 PM  Admit Dx: Anemia [D64.9]  Anemia, unspecified type [D64.9]    Subjective:    Patient was admitted with Anemia [D64.9]  Anemia, unspecified type [D64.9]. Patient denies fever, chills, cp, sob, n/v.     warfarin  4 mg Oral Once    metOLazone  5 mg Oral Daily    [START ON 8/6/2024] ferric gluconate (FERRLECIT) 125 mg in sodium chloride 0.9 % 100 mL IVPB  125 mg IntraVENous Once    warfarin placeholder: dosing by pharmacy   Other RX Placeholder    [Held by provider] bumetanide  1 mg IntraVENous BID    methocarbamol  750 mg Oral 4x Daily    aspirin  81 mg Oral Daily    atorvastatin  20 mg Oral Nightly    metoprolol succinate  25 mg Oral BID    Vitamin D  1,000 Units Oral Daily    pantoprazole  40 mg Oral BID AC    lactulose  20 g Oral Daily    busPIRone  5 mg Oral TID    folic acid  1 mg Oral Daily    [Held by provider] hydrALAZINE  10 mg Oral 3 times per day    isosorbide dinitrate  10 mg Oral TID    midodrine  5 mg Oral TID WC    mirtazapine  7.5 mg Oral Nightly    nicotine  1 patch TransDERmal Daily    thiamine  100 mg Oral Daily    rifAXIMin  550 mg Oral BID    sodium chloride flush  5-40 mL IntraVENous 2 times per day     oxyCODONE, 5 mg, Q6H PRN  oxyCODONE, 10 mg, Q6H PRN  sodium chloride, , PRN  sodium chloride flush, 5-40 mL, PRN  sodium chloride, , PRN  potassium chloride, 40 mEq, PRN   Or  potassium alternative oral replacement, 40 mEq, PRN   Or  potassium chloride, 10 mEq, PRN  magnesium sulfate, 2,000 mg, PRN  polyethylene glycol, 17 g, Daily PRN  prochlorperazine, 10 mg, Q8H PRN   Or  prochlorperazine, 10 mg, Q6H PRN         Objective:    /66   Pulse 72   Temp 98 °F (36.7 °C) (Oral)   Resp 18   Ht 1.778 m (5' 10\")   Wt 115.8 kg (255 lb 6.4 oz)   SpO2 100%   BMI 36.65 kg/m²   Skin: warm and dry, no rash or erythema  Pulmonary/Chest: clear to auscultation bilaterally- no wheezes, rales or     GLUCOSE 90 08/05/2024 07:30 AM     Magnesium:    Lab Results   Component Value Date/Time    MG 2.1 08/05/2024 07:30 AM        Radiology:   Vascular duplex lower extremity venous right   Final Result   No evidence of DVT in the right lower extremity.         XR CHEST PORTABLE   Final Result   Cardiomegaly with underlying chronic interstitial changes seen in the lung   fields. No new focal parenchymal opacification present.             Assessment:    Principal Problem:    Anemia  Active Problems:    H/O mitral valve replacement with mechanical valve    H/O mechanical aortic valve replacement    Supratherapeutic INR    AV block    Acute blood loss anemia    Heart failure with mid-range ejection fraction (HFmEF) ([facility])    Acute on chronic combined systolic and diastolic congestive heart failure (HCC)    Normally functioning cardiac pacemaker present  Resolved Problems:    * No resolved hospital problems. *      Plan:  Acute on chronic systolic chf pt on bumex gtt continue medication  cardio following  Acidosis monitor  Varinder pt on bumex gtt renal following  Hypomagnesemia monitor and replace prn  Anemia monitor s/p transfusion hematology on consult.  Cirrhosis with hepatic encephalopathy likely due to alcohol abuse  continue lactulose/rifaximin  continue thiaimine  Hyperlipidemia continue statin  Htn continue med        Electronically signed by Nikhil Preston DO on 8/5/2024 at 6:11 PM

## 2024-08-05 NOTE — PROGRESS NOTES
Pharmacy Consultation Note  (Warfarin Dosing and Monitoring)    Initial consult date: 8/2  Consulting Provider: Latanya Dorsey is a 59 y.o. male for whom pharmacy has been asked to manage warfarin therapy.     Weight:   Wt Readings from Last 1 Encounters:   08/04/24 115.8 kg (255 lb 6.4 oz)       TSH:    Lab Results   Component Value Date/Time    TSH 0.95 05/08/2024 02:24 AM       Hepatic Function Panel:                            Lab Results   Component Value Date/Time    ALKPHOS 142 08/03/2024 03:27 AM    ALT 9 08/03/2024 03:27 AM    AST 16 08/03/2024 03:27 AM    BILITOT 1.1 08/03/2024 03:27 AM    BILIDIR 0.2 07/14/2023 05:43 PM    IBILI 0.2 07/14/2023 05:43 PM       Current significant warfarin drug-drug interactions include: No significant interactions    Recent Labs     08/03/24  1208 08/04/24  0524 08/05/24  0730   HGB 8.9* 8.7* 8.9*    177 183       Date Warfarin Dose INR Heparin or LMWH Comment   8/3 HOLD 5 Lovenox DC'd    8/4 HOLD 4 --    8/5 4 mg 3.1 --                    Assessment:  Patient is a 59 y.o. male on warfarin for Atrial Fibrillation, Mechanical Heart Valve (mitral), and Mechanical Heart Valve (atrial).  Patient's home warfarin dosing regimen is 4mg daily per med rec but patient filled 3mg tabs most recently.   INR possibly elevated in setting of liver dysfunction.  Goal INR 2.5 - 3.5  INR 3.1 today    Plan:  Warfarin 4 mg tonight.   Daily PT/INR until the INR is stable within the therapeutic range.  Pharmacist will follow and monitor/adjust dosing as necessary.    Thank you for this consult,    Jamie Nolen, PharmD 8/5/2024 1:52 PM   Ext: [phone]

## 2024-08-05 NOTE — PROGRESS NOTES
Department of Internal Medicine  Nephrology Progress Note    Events reviewed     SUBJECTIVE:   We are following for FRANTZ and fluid overload. He has no complaints today.    PHYSICAL EXAM:      Vitals:    VITALS:  /80   Pulse 75   Temp 97.9 °F (36.6 °C) (Oral)   Resp 18   Ht 1.778 m (5' 10\")   Wt 115.8 kg (255 lb 6.4 oz)   SpO2 97%   BMI 36.65 kg/m²   24HR BLOOD PRESSURE RANGE:  Systolic (24hrs), Av , Min:101 , Max:125   ; Diastolic (24hrs), Av, Min:58, Max:84    24HR INTAKE/OUTPUT:    Intake/Output Summary (Last 24 hours) at 2024 1019  Last data filed at 2024 0742  Gross per 24 hour   Intake 604.56 ml   Output 1300 ml   Net -695.44 ml         Constitutional:  awake NAD  HEENT:  EOMI  Respiratory:  CTA  Cardiovascular/Edema:  S1 S2  Gastrointestinal:  +BS  Neurologic:  no deficits  Skin:  warm dry  Other:  BLE ++edema     DATA:    CBC with Differential:    Lab Results   Component Value Date/Time    WBC 9.6 2024 07:30 AM    RBC 3.86 2024 07:30 AM    HGB 8.9 2024 07:30 AM    HCT 32.4 2024 07:30 AM     2024 07:30 AM    MCV 83.9 2024 07:30 AM    MCH 23.1 2024 07:30 AM    MCHC 27.5 2024 07:30 AM    RDW 19.4 2024 07:30 AM    NRBC 1 2024 03:59 AM    METASPCT DUPLICATE 2023 04:12 AM    LYMPHOPCT 9 2024 07:30 AM    PROMYELOPCT DUPLICATE 2023 04:12 AM    MONOPCT 8 2024 07:30 AM    MYELOPCT DUPLICATE 2023 04:12 AM    EOSPCT 3 2024 07:30 AM    BASOPCT 2 2024 07:30 AM    MONOSABS 0.76 2024 07:30 AM    LYMPHSABS 0.85 2024 07:30 AM    EOSABS 0.25 2024 07:30 AM    BASOSABS 0.17 2024 07:30 AM     CMP:    Lab Results   Component Value Date/Time     2024 07:30 AM    K 4.4 2024 07:30 AM    K 3.9 09/15/2022 03:08 PM     2024 07:30 AM    CO2 19 2024 07:30 AM    BUN 34 2024 07:30 AM    CREATININE 2.9 2024 07:30 AM    GFRAA >60  09/15/2022 03:08 PM    LABGLOM 24 08/05/2024 07:30 AM    LABGLOM 31 03/05/2024 05:57 AM    GLUCOSE 90 08/05/2024 07:30 AM    CALCIUM 9.1 08/05/2024 07:30 AM    BILITOT 1.1 08/03/2024 03:27 AM    ALKPHOS 142 08/03/2024 03:27 AM    AST 16 08/03/2024 03:27 AM    ALT 9 08/03/2024 03:27 AM     Magnesium:    Lab Results   Component Value Date/Time    MG 2.1 08/05/2024 07:30 AM     Phosphorus:    Lab Results   Component Value Date/Time    PHOS 2.6 08/03/2024 03:27 AM     Radiology Review:        XR CHEST PORTABLE 8/2/24    IMPRESSION:  Cardiomegaly with underlying chronic interstitial changes seen in the lung  fields. No new focal parenchymal opacification present.         BRIEF SUMMARY OF INITIAL CONSULT:    Briefly Mr. Dorsey is a 59-year-old man with history of CKD stage IIIa, with large proteinuria, probably due to nephrosclerosis, baseline creatinine 1.5-1.7 mg/dL, HTN, CAD status post CABG, HFpEF 55%, valvular heart disease status post AVR and MVR both mechanical valves, post pacemaker placement, hyperlipidemia, CVA, testicular cancer status postchemotherapy, alcohol abuse, fatty liver, who was admitted on 8/2/2024 for shortness of breath and bilateral lower extremity edema, his proBNP was 1489, chest x-ray showed cardiomegaly with underlying chronic interstitial changes. Creatinine on admission 1.8 mg/dL has increased to 2.4 mg/dL, reason for this consultation. Home medications, empagliflozin, metoprolol, cholecalciferol, atorvastatin.     IMPRESSION/RECOMMENDATIONS:       FRANTZ on CKD, most likely hemodynamically mediated 2/2 acute decompensated heart failure, cardiorenal syndrome, baseline creatinine 1.5-1.8 mg/dL.  Renal function continues to worsen since admission, creatinine level up to 2.9 mg/dL, will obtain bladder scan to rule out urinary retention, nevertheless patient still largely volume overloaded and needs further diuresis.  To add metolazone and to obtain bladder scan.      CKD stage IIIa, probably

## 2024-08-05 NOTE — ACP (ADVANCE CARE PLANNING)
Advance Care Planning   Healthcare Decision Maker:    Primary Decision Maker: Leonie Moreland - Domestic Partner - [phone]    Click here to complete Healthcare Decision Makers including selection of the Healthcare Decision Maker Relationship (ie \"Primary\").

## 2024-08-06 LAB
ANION GAP SERPL CALCULATED.3IONS-SCNC: 17 MMOL/L (ref 7–16)
BUN SERPL-MCNC: 41 MG/DL (ref 6–20)
CALCIUM SERPL-MCNC: 9.1 MG/DL (ref 8.6–10.2)
CHLORIDE SERPL-SCNC: 101 MMOL/L (ref 98–107)
CO2 SERPL-SCNC: 22 MMOL/L (ref 22–29)
CREAT SERPL-MCNC: 2.9 MG/DL (ref 0.7–1.2)
GFR, ESTIMATED: 24 ML/MIN/1.73M2
GLUCOSE SERPL-MCNC: 93 MG/DL (ref 74–99)
INR PPP: 2.6
MAGNESIUM SERPL-MCNC: 1.9 MG/DL (ref 1.6–2.6)
POTASSIUM SERPL-SCNC: 3.9 MMOL/L (ref 3.5–5)
PROTHROMBIN TIME: 27.6 SEC (ref 9.3–12.4)
SODIUM SERPL-SCNC: 140 MMOL/L (ref 132–146)

## 2024-08-06 PROCEDURE — 51798 US URINE CAPACITY MEASURE: CPT

## 2024-08-06 PROCEDURE — 83735 ASSAY OF MAGNESIUM: CPT

## 2024-08-06 PROCEDURE — 99233 SBSQ HOSP IP/OBS HIGH 50: CPT | Performed by: INTERNAL MEDICINE

## 2024-08-06 PROCEDURE — 2580000003 HC RX 258: Performed by: INTERNAL MEDICINE

## 2024-08-06 PROCEDURE — 6370000000 HC RX 637 (ALT 250 FOR IP): Performed by: INTERNAL MEDICINE

## 2024-08-06 PROCEDURE — 6360000002 HC RX W HCPCS: Performed by: INTERNAL MEDICINE

## 2024-08-06 PROCEDURE — 80048 BASIC METABOLIC PNL TOTAL CA: CPT

## 2024-08-06 PROCEDURE — 6370000000 HC RX 637 (ALT 250 FOR IP): Performed by: NURSE PRACTITIONER

## 2024-08-06 PROCEDURE — 99232 SBSQ HOSP IP/OBS MODERATE 35: CPT | Performed by: INTERNAL MEDICINE

## 2024-08-06 PROCEDURE — 85610 PROTHROMBIN TIME: CPT

## 2024-08-06 PROCEDURE — 2060000000 HC ICU INTERMEDIATE R&B

## 2024-08-06 RX ORDER — WARFARIN SODIUM 3 MG/1
6 TABLET ORAL
Status: COMPLETED | OUTPATIENT
Start: 2024-08-06 | End: 2024-08-06

## 2024-08-06 RX ORDER — TAMSULOSIN HYDROCHLORIDE 0.4 MG/1
0.4 CAPSULE ORAL NIGHTLY
Status: DISCONTINUED | OUTPATIENT
Start: 2024-08-06 | End: 2024-08-17 | Stop reason: HOSPADM

## 2024-08-06 RX ORDER — OXYCODONE HYDROCHLORIDE 5 MG/1
5 TABLET ORAL EVERY 6 HOURS PRN
Status: DISCONTINUED | OUTPATIENT
Start: 2024-08-06 | End: 2024-08-17 | Stop reason: HOSPADM

## 2024-08-06 RX ADMIN — BUSPIRONE HYDROCHLORIDE 5 MG: 5 TABLET ORAL at 16:54

## 2024-08-06 RX ADMIN — LACTULOSE 20 G: 10 SOLUTION ORAL at 10:32

## 2024-08-06 RX ADMIN — METOPROLOL SUCCINATE 25 MG: 25 TABLET, EXTENDED RELEASE ORAL at 20:25

## 2024-08-06 RX ADMIN — PANTOPRAZOLE SODIUM 40 MG: 40 TABLET, DELAYED RELEASE ORAL at 05:24

## 2024-08-06 RX ADMIN — MIRTAZAPINE 7.5 MG: 15 TABLET, FILM COATED ORAL at 20:26

## 2024-08-06 RX ADMIN — PANTOPRAZOLE SODIUM 40 MG: 40 TABLET, DELAYED RELEASE ORAL at 16:55

## 2024-08-06 RX ADMIN — ATORVASTATIN CALCIUM 20 MG: 20 TABLET, FILM COATED ORAL at 20:25

## 2024-08-06 RX ADMIN — RIFAXIMIN 550 MG: 550 TABLET ORAL at 10:31

## 2024-08-06 RX ADMIN — ISOSORBIDE DINITRATE 10 MG: 10 TABLET ORAL at 10:30

## 2024-08-06 RX ADMIN — METHOCARBAMOL TABLETS 750 MG: 750 TABLET, COATED ORAL at 10:31

## 2024-08-06 RX ADMIN — BUMETANIDE 0.5 MG/HR: 0.25 INJECTION INTRAMUSCULAR; INTRAVENOUS at 19:30

## 2024-08-06 RX ADMIN — Medication 1000 UNITS: at 10:30

## 2024-08-06 RX ADMIN — SODIUM CHLORIDE, PRESERVATIVE FREE 10 ML: 5 INJECTION INTRAVENOUS at 10:32

## 2024-08-06 RX ADMIN — METHOCARBAMOL TABLETS 750 MG: 750 TABLET, COATED ORAL at 16:55

## 2024-08-06 RX ADMIN — METOPROLOL SUCCINATE 25 MG: 25 TABLET, EXTENDED RELEASE ORAL at 10:30

## 2024-08-06 RX ADMIN — OXYCODONE HYDROCHLORIDE 10 MG: 5 TABLET ORAL at 16:45

## 2024-08-06 RX ADMIN — SODIUM CHLORIDE, PRESERVATIVE FREE 10 ML: 5 INJECTION INTRAVENOUS at 20:28

## 2024-08-06 RX ADMIN — ISOSORBIDE DINITRATE 10 MG: 10 TABLET ORAL at 20:26

## 2024-08-06 RX ADMIN — BUSPIRONE HYDROCHLORIDE 5 MG: 5 TABLET ORAL at 20:26

## 2024-08-06 RX ADMIN — MIDODRINE HYDROCHLORIDE 5 MG: 5 TABLET ORAL at 10:31

## 2024-08-06 RX ADMIN — OXYCODONE HYDROCHLORIDE 10 MG: 5 TABLET ORAL at 10:30

## 2024-08-06 RX ADMIN — WARFARIN SODIUM 6 MG: 3 TABLET ORAL at 16:39

## 2024-08-06 RX ADMIN — RIFAXIMIN 550 MG: 550 TABLET ORAL at 20:26

## 2024-08-06 RX ADMIN — METOLAZONE 5 MG: 5 TABLET ORAL at 10:31

## 2024-08-06 RX ADMIN — TAMSULOSIN HYDROCHLORIDE 0.4 MG: 0.4 CAPSULE ORAL at 20:25

## 2024-08-06 RX ADMIN — FOLIC ACID 1 MG: 1 TABLET ORAL at 10:30

## 2024-08-06 RX ADMIN — BUSPIRONE HYDROCHLORIDE 5 MG: 5 TABLET ORAL at 10:30

## 2024-08-06 RX ADMIN — ASPIRIN 81 MG CHEWABLE TABLET 81 MG: 81 TABLET CHEWABLE at 10:30

## 2024-08-06 RX ADMIN — METHOCARBAMOL TABLETS 750 MG: 750 TABLET, COATED ORAL at 20:26

## 2024-08-06 RX ADMIN — SODIUM CHLORIDE 125 MG: 900 INJECTION INTRAVENOUS at 10:33

## 2024-08-06 RX ADMIN — MIDODRINE HYDROCHLORIDE 5 MG: 5 TABLET ORAL at 16:54

## 2024-08-06 RX ADMIN — Medication 100 MG: at 10:30

## 2024-08-06 ASSESSMENT — PAIN SCALES - GENERAL
PAINLEVEL_OUTOF10: 7
PAINLEVEL_OUTOF10: 8

## 2024-08-06 ASSESSMENT — PAIN DESCRIPTION - ORIENTATION: ORIENTATION: LEFT

## 2024-08-06 NOTE — PLAN OF CARE

## 2024-08-06 NOTE — CONSULTS
8/6/2024 10:00 AM  Len Dorsey  [id number]     Chief Complaint:    Urinary retention      History of Present Illness:      The patient is a 59 y.o. male patient who presented to the hospital with complaints of shortness of breath. He was admitted for CHF. Nephrology is following and the patient is currently on a Bumex drip. Urology was asked to evaluate for urinary retention. He was bladder scanned today for 335ml and a marks catheter was inserted. He also has a history of testicular cancer that was diagnosed in 1988 and treated with a radical orchiectomy and 6 months of chemotherapy. He is not a good historian. Denies issues urinating.     Past Medical History:   Diagnosis Date    Alcoholic liver disease (HCC)     H/O prosthetic aortic valve replacement 09/2020    [facility]  Mitral and pacemaker at this time as well    History of mitral valve replacement 09/2020    at [facility]  Aortic Valve placed at this time as well as pacemaker    Stroke (HCC)     Testicular cancer (HCC)     in remission since 1988         Past Surgical History:   Procedure Laterality Date    COLONOSCOPY N/A 8/24/2022    COLONOSCOPY POLYPECTOMY HOT BIOPSY performed [place] Rene Lorenzo [place] [facility]    FRACTURE SURGERY      Left Tibial Plateau Fracture 3/29/2022     PACEMAKER PLACEMENT  09/2020    UPPER GASTROINTESTINAL ENDOSCOPY N/A 8/24/2022    EGD DIAGNOSTIC ONLY performed by Rene Lorenzo MD at [facility]    UPPER GASTROINTESTINAL ENDOSCOPY N/A 5/9/2024    ESOPHAGOGASTRODUODENOSCOPY          +++AVAIL 1430+++ performed by Min Mcdaniels DO at [facility]       Medications Prior to Admission:    Medications Prior to Admission: warfarin (COUMADIN) 3 MG tablet, Take 1 tablet by mouth daily (Patient taking differently: Take 4 mg by mouth daily)  busPIRone (BUSPAR) 5 MG tablet, Take 1 tablet by mouth 3 times daily  folic acid (FOLVITE) 1 MG tablet, Take 1 tablet by mouth daily  hydrALAZINE (APRESOLINE) 10 MG

## 2024-08-06 NOTE — PROGRESS NOTES
[facility] Hospitalist   Progress Note    Admitting Date and Time: 8/2/2024 12:11 PM  Admit Dx: Anemia [D64.9]  Anemia, unspecified type [D64.9]    Subjective:    Patient was admitted with Anemia [D64.9]  Anemia, unspecified type [D64.9]. Patient denies fever, chills, cp, sob, n/v.     tamsulosin  0.4 mg Oral Nightly    warfarin  6 mg Oral Once    metOLazone  5 mg Oral Daily    warfarin placeholder: dosing by pharmacy   Other RX Placeholder    [Held by provider] bumetanide  1 mg IntraVENous BID    methocarbamol  750 mg Oral 4x Daily    aspirin  81 mg Oral Daily    atorvastatin  20 mg Oral Nightly    metoprolol succinate  25 mg Oral BID    Vitamin D  1,000 Units Oral Daily    pantoprazole  40 mg Oral BID AC    lactulose  20 g Oral Daily    busPIRone  5 mg Oral TID    folic acid  1 mg Oral Daily    [Held by provider] hydrALAZINE  10 mg Oral 3 times per day    isosorbide dinitrate  10 mg Oral TID    midodrine  5 mg Oral TID WC    mirtazapine  7.5 mg Oral Nightly    nicotine  1 patch TransDERmal Daily    thiamine  100 mg Oral Daily    rifAXIMin  550 mg Oral BID    sodium chloride flush  5-40 mL IntraVENous 2 times per day     oxyCODONE, 5 mg, Q6H PRN  oxyCODONE, 10 mg, Q6H PRN  sodium chloride, , PRN  sodium chloride flush, 5-40 mL, PRN  sodium chloride, , PRN  potassium chloride, 40 mEq, PRN   Or  potassium alternative oral replacement, 40 mEq, PRN   Or  potassium chloride, 10 mEq, PRN  magnesium sulfate, 2,000 mg, PRN  polyethylene glycol, 17 g, Daily PRN  prochlorperazine, 10 mg, Q8H PRN   Or  prochlorperazine, 10 mg, Q6H PRN         Objective:    /61   Pulse 74   Temp 97.8 °F (36.6 °C) (Oral)   Resp 18   Ht 1.778 m (5' 10\")   Wt 110.4 kg (243 lb 6.4 oz)   SpO2 91%   BMI 34.92 kg/m²   Skin: warm and dry, no rash or erythema  Pulmonary/Chest: clear to auscultation bilaterally- no wheezes, rales or rhonchi, normal air movement, no respiratory distress  Cardiovascular: rhythm reg at rate  of 76  Abdomen: soft, non-tender, non-distended, normal bowel sounds, no masses or organomegaly  Extremities: no cyanosis, no clubbing, and no edema      Recent Labs     08/04/24  0524 08/05/24  0730 08/06/24  0519    139 140   K 4.8 4.4 3.9    105 101   CO2 19* 19* 22   BUN 24* 34* 41*   CREATININE 2.4* 2.9* 2.9*   GLUCOSE 100* 90 93   CALCIUM 8.9 9.1 9.1       Recent Labs     08/04/24  0524 08/05/24  0730   WBC 10.1 9.6   RBC 3.82 3.86   HGB 8.7* 8.9*   HCT 32.6* 32.4*   MCV 85.3 83.9   MCH 22.8* 23.1*   MCHC 26.7* 27.5*   RDW 18.6* 19.4*    183   MPV 11.9 11.7       BMP:    Lab Results   Component Value Date/Time     08/06/2024 05:19 AM    K 3.9 08/06/2024 05:19 AM    K 3.9 09/15/2022 03:08 PM     08/06/2024 05:19 AM    CO2 22 08/06/2024 05:19 AM    BUN 41 08/06/2024 05:19 AM    CREATININE 2.9 08/06/2024 05:19 AM    CALCIUM 9.1 08/06/2024 05:19 AM    GFRAA >60 09/15/2022 03:08 PM    LABGLOM 24 08/06/2024 05:19 AM    LABGLOM 31 03/05/2024 05:57 AM    GLUCOSE 93 08/06/2024 05:19 AM     Magnesium:    Lab Results   Component Value Date/Time    MG 1.9 08/06/2024 05:19 AM        Radiology:   Vascular duplex lower extremity venous right   Final Result   No evidence of DVT in the right lower extremity.         XR CHEST PORTABLE   Final Result   Cardiomegaly with underlying chronic interstitial changes seen in the lung   fields. No new focal parenchymal opacification present.             Assessment:    Principal Problem:    Anemia  Active Problems:    H/O mitral valve replacement with mechanical valve    H/O mechanical aortic valve replacement    Supratherapeutic INR    AV block    Acute blood loss anemia    Heart failure with mid-range ejection fraction (HFmEF) ([facility])    Acute on chronic combined systolic and diastolic congestive heart failure (HCC)    Normally functioning cardiac pacemaker present    Hepatic cirrhosis (HCC)    Acute on chronic systolic (congestive) heart failure  (HCC)  Resolved Problems:    * No resolved hospital problems. *      Plan:  Acute on chronic systolic chf pt on bumex gtt continue medication  cardio following  Acidosis monitor  Varinder pt on bumex gtt renal following  Hypomagnesemia monitor and replace prn  Anemia monitor s/p transfusion hematology on consult.  Cirrhosis with hepatic encephalopathy likely due to alcohol abuse  continue lactulose/rifaximin  continue thiaimine  Hyperlipidemia continue statin  Htn continue med    Urology consulted for urinary retention. Continue marks. Flomax added    Pt having no complaints today in my discussion with pt.. exam unchanged from yesterday. Nephrology concerned for cellulitis and requesting ID consult. Will consult. Check wbc's and monitor for fever    Electronically signed by Nikhil Preston,  on 8/6/2024 at 4:42 PM

## 2024-08-06 NOTE — PROGRESS NOTES
Pharmacy Consultation Note  (Warfarin Dosing and Monitoring)    Initial consult date: 8/2  Consulting Provider: Latanya Dorsey is a 59 y.o. male for whom pharmacy has been asked to manage warfarin therapy.     Weight:   Wt Readings from Last 1 Encounters:   08/06/24 110.4 kg (243 lb 6.4 oz)       TSH:    Lab Results   Component Value Date/Time    TSH 0.95 05/08/2024 02:24 AM       Hepatic Function Panel:                            Lab Results   Component Value Date/Time    ALKPHOS 142 08/03/2024 03:27 AM    ALT 9 08/03/2024 03:27 AM    AST 16 08/03/2024 03:27 AM    BILITOT 1.1 08/03/2024 03:27 AM    BILIDIR 0.2 07/14/2023 05:43 PM    IBILI 0.2 07/14/2023 05:43 PM       Current significant warfarin drug-drug interactions include: No significant interactions    Recent Labs     08/04/24  0524 08/05/24  0730   HGB 8.7* 8.9*    183       Date Warfarin Dose INR Heparin or LMWH Comment   8/3 HOLD 5 Lovenox DC'd    8/4 HOLD 4 --    8/5 4 mg 3.1 --    8/6 6 mg 2.6 --             Assessment:  Patient is a 59 y.o. male on warfarin for Atrial Fibrillation, Mechanical Heart Valve (mitral), and Mechanical Heart Valve (atrial).  Patient's home warfarin dosing regimen is 4mg daily per med rec but patient filled 3mg tabs most recently.   INR possibly elevated in setting of liver dysfunction.  Goal INR 2.5 - 3.5  INR 2.6 today    Plan:  Warfarin 6 mg tonight.   Daily PT/INR until the INR is stable within the therapeutic range.  Pharmacist will follow and monitor/adjust dosing as necessary.    Thank you for this consult,    Jamie Nolen, PharmD 8/6/2024 3:00 PM   Ext: [phone]

## 2024-08-06 NOTE — PROGRESS NOTES
[facility] Cardiology Inpatient Progress Note    Patient is a 59 y.o. male of Jama Skinner PA seen in hospital follow up.     Chief complaint: CHF    HPI: Some SOB. No CP.     Patient Active Problem List   Diagnosis    Chest pain    Closed fracture of nasal bones    Injury of face    Retroperitoneal hematoma    Vitamin D deficiency    Acute chest pain    Acute cholecystitis    Gastrointestinal hemorrhage    Polyp of colon    Right upper quadrant abdominal pain    Diarrhea    Current moderate episode of major depressive disorder without prior episode (HCC)    Hypertension    H/O mitral valve replacement with mechanical valve    H/O mechanical aortic valve replacement    Coronary artery disease involving native coronary artery of native heart without angina pectoris    Paravalvular leak (prosthetic valve)    Hx of CABG    Warfarin anticoagulation    Supratherapeutic INR    Moderate obesity    Dyspnea    Alcohol withdrawal syndrome without complication (HCC)    DTs (delirium tremens) (HCC)    Anemia    Severe protein-calorie malnutrition (HCC)    Acute decompensated heart failure (HCC)    Aortic valve disease    Mitral valve disease    AV block    Pure hypercholesterolemia    Chronic obstructive pulmonary disease (HCC)    Loculated pleural effusion    Troponin level elevated    Stage 2 chronic kidney disease    Alcohol use    Breast swelling    Pleural effusion    Hypophosphatemia    Acute congestive heart failure (HCC)    FRANTZ (acute kidney injury) (HCC)    Acidosis    Hyperglycemia    Hyperlipidemia    Alcoholic liver disease, unspecified (HCC)    Thrombocytopenia (HCC)    Delirium    Acute blood loss anemia    Heart failure with mid-range ejection fraction (HFmEF) (HCC)    Acute on chronic combined systolic and diastolic congestive heart failure (HCC)    Normally functioning cardiac pacemaker present    Hepatic cirrhosis (HCC)    Acute on chronic systolic (congestive) heart failure (HCC)       No Known  chest pain    Acute cholecystitis    Gastrointestinal hemorrhage    Polyp of colon    Right upper quadrant abdominal pain    Diarrhea    Current moderate episode of major depressive disorder without prior episode (HCC)    Hypertension    H/O mitral valve replacement with mechanical valve    H/O mechanical aortic valve replacement    Coronary artery disease involving native coronary artery of native heart without angina pectoris    Paravalvular leak (prosthetic valve)    Hx of CABG    Warfarin anticoagulation    Supratherapeutic INR    Moderate obesity    Dyspnea    Alcohol withdrawal syndrome without complication (HCC)    DTs (delirium tremens) (HCC)    Anemia    Severe protein-calorie malnutrition (HCC)    Acute decompensated heart failure (HCC)    Aortic valve disease    Mitral valve disease    AV block    Pure hypercholesterolemia    Chronic obstructive pulmonary disease (HCC)    Loculated pleural effusion    Troponin level elevated    Stage 2 chronic kidney disease    Alcohol use    Breast swelling    Pleural effusion    Hypophosphatemia    Acute congestive heart failure (HCC)    FRANTZ (acute kidney injury) (HCC)    Acidosis    Hyperglycemia    Hyperlipidemia    Alcoholic liver disease, unspecified (HCC)    Thrombocytopenia (HCC)    Delirium    Acute blood loss anemia    Heart failure with mid-range ejection fraction (HFmEF) (HCC)    Acute on chronic combined systolic and diastolic congestive heart failure (HCC)    Normally functioning cardiac pacemaker present    Hepatic cirrhosis (HCC)    Acute on chronic systolic (congestive) heart failure (HCC)     1. Acute on chronic heart failure:    EF 40-45% by 5/2024 echo.     BB/hydralazine/nitrate. D/c empagliflozin due to worsening renal function.     On IV bumex.     Renal following.     2. VHD: Redo mechanical AVR/MVR 3/2023 2/2 endocarditis with perivalvular mitral regurgitation. Mod TR.     INR 2.6 today.     3. CAD:    LIMA to LAD original AVR/MVR surgery

## 2024-08-06 NOTE — CARE COORDINATION
Social work / Discharge planning:          Per public benefits, patient is not financially eligible for assistance with medications.    Physician updated.    Electronically signed by ANJEL Hernandez on 8/6/2024 at 10:48 AM

## 2024-08-06 NOTE — PROGRESS NOTES
Department of Internal Medicine  Nephrology Progress Note    Events reviewed     SUBJECTIVE:   We are following for FRANTZ and fluid overload. He has no complaints today.    PHYSICAL EXAM:      Vitals:    VITALS:  /67   Pulse 74   Temp 98.1 °F (36.7 °C) (Oral)   Resp 18   Ht 1.778 m (5' 10\")   Wt 110.4 kg (243 lb 6.4 oz)   SpO2 99%   BMI 34.92 kg/m²   24HR BLOOD PRESSURE RANGE:  Systolic (24hrs), Av , Min:97 , Max:112   ; Diastolic (24hrs), Av, Min:61, Max:77    24HR INTAKE/OUTPUT:    Intake/Output Summary (Last 24 hours) at 2024 0858  Last data filed at 2024 0421  Gross per 24 hour   Intake 227.43 ml   Output 2600 ml   Net -2372.57 ml         Constitutional:  awake NAD  HEENT:  EOMI  Respiratory:  CTA  Cardiovascular/Edema:  S1 S2  Gastrointestinal:  +BS  Neurologic:  no deficits  Skin:  warm dry  Other:  BLE ++edema     DATA:    CBC with Differential:    Lab Results   Component Value Date/Time    WBC 9.6 2024 07:30 AM    RBC 3.86 2024 07:30 AM    HGB 8.9 2024 07:30 AM    HCT 32.4 2024 07:30 AM     2024 07:30 AM    MCV 83.9 2024 07:30 AM    MCH 23.1 2024 07:30 AM    MCHC 27.5 2024 07:30 AM    RDW 19.4 2024 07:30 AM    NRBC 1 2024 03:59 AM    METASPCT DUPLICATE 2023 04:12 AM    LYMPHOPCT 9 2024 07:30 AM    PROMYELOPCT DUPLICATE 2023 04:12 AM    MONOPCT 8 2024 07:30 AM    MYELOPCT DUPLICATE 2023 04:12 AM    EOSPCT 3 2024 07:30 AM    BASOPCT 2 2024 07:30 AM    MONOSABS 0.76 2024 07:30 AM    LYMPHSABS 0.85 2024 07:30 AM    EOSABS 0.25 2024 07:30 AM    BASOSABS 0.17 2024 07:30 AM     CMP:    Lab Results   Component Value Date/Time     2024 05:19 AM    K 3.9 2024 05:19 AM    K 3.9 09/15/2022 03:08 PM     2024 05:19 AM    CO2 22 2024 05:19 AM    BUN 41 2024 05:19 AM    CREATININE 2.9 2024 05:19 AM    GFRAA >60

## 2024-08-06 NOTE — PROGRESS NOTES
SPIRITUAL HEALTH SERVICES - [facility] Man Encounter    Name: Len Dorsey                  Referral: Routine Visit    Sacraments  Anointed (Last Rites): No  Apostolic [place]: No  Confession: No  Communion: No     Assessment:  Patient declined visit.      Intervention:  None.     Outcome:  None.    Plan:  Chaplains will remain available to offer spiritual and emotional support as needed.      Electronically signed by Chaplain Bar, on 8/6/2024 at 7:39 PM.  Spiritual Care Department  [facility]  [phone]

## 2024-08-06 NOTE — PROGRESS NOTES
While rounding this  found the patient lying in bed at rest and appearing calm. The patient was not disturbed and silent prayers of the Orthodox steve were said for the patient.  remains available for support.

## 2024-08-06 NOTE — PROGRESS NOTES
Consult completed to Infectious Disease via perfect serve phone call    Zohra Handley - unit secretary

## 2024-08-07 LAB
ANION GAP SERPL CALCULATED.3IONS-SCNC: 15 MMOL/L (ref 7–16)
ANION GAP SERPL CALCULATED.3IONS-SCNC: 17 MMOL/L (ref 7–16)
ATYPICAL LYMPHOCYTE ABSOLUTE COUNT: 0.33 K/UL (ref 0–0.46)
ATYPICAL LYMPHOCYTES: 4 % (ref 0–4)
BASOPHILS # BLD: 0.08 K/UL (ref 0–0.2)
BASOPHILS NFR BLD: 1 % (ref 0–2)
BUN SERPL-MCNC: 47 MG/DL (ref 6–20)
BUN SERPL-MCNC: 51 MG/DL (ref 6–20)
CALCIUM SERPL-MCNC: 9.4 MG/DL (ref 8.6–10.2)
CALCIUM SERPL-MCNC: 9.5 MG/DL (ref 8.6–10.2)
CHLORIDE SERPL-SCNC: 95 MMOL/L (ref 98–107)
CHLORIDE SERPL-SCNC: 98 MMOL/L (ref 98–107)
CO2 SERPL-SCNC: 27 MMOL/L (ref 22–29)
CO2 SERPL-SCNC: 28 MMOL/L (ref 22–29)
CREAT SERPL-MCNC: 3 MG/DL (ref 0.7–1.2)
CREAT SERPL-MCNC: 3 MG/DL (ref 0.7–1.2)
EOSINOPHIL # BLD: 0.17 K/UL (ref 0.05–0.5)
EOSINOPHILS RELATIVE PERCENT: 2 % (ref 0–6)
ERYTHROCYTE [DISTWIDTH] IN BLOOD BY AUTOMATED COUNT: 21.6 % (ref 11.5–15)
GFR, ESTIMATED: 23 ML/MIN/1.73M2
GFR, ESTIMATED: 23 ML/MIN/1.73M2
GLUCOSE SERPL-MCNC: 120 MG/DL (ref 74–99)
GLUCOSE SERPL-MCNC: 95 MG/DL (ref 74–99)
HCT VFR BLD AUTO: 35.6 % (ref 37–54)
HGB BLD-MCNC: 10.1 G/DL (ref 12.5–16.5)
INR PPP: 2.6
LYMPHOCYTES NFR BLD: 0.83 K/UL (ref 1.5–4)
LYMPHOCYTES RELATIVE PERCENT: 9 % (ref 20–42)
MCH RBC QN AUTO: 23.4 PG (ref 26–35)
MCHC RBC AUTO-ENTMCNC: 28.4 G/DL (ref 32–34.5)
MCV RBC AUTO: 82.6 FL (ref 80–99.9)
MONOCYTES NFR BLD: 0.67 K/UL (ref 0.1–0.95)
MONOCYTES NFR BLD: 7 % (ref 2–12)
NEUTROPHILS NFR BLD: 78 % (ref 43–80)
NEUTS SEG NFR BLD: 7.42 K/UL (ref 1.8–7.3)
NUCLEATED RED BLOOD CELLS: 1 PER 100 WBC
PLATELET, FLUORESCENCE: 183 K/UL (ref 130–450)
PMV BLD AUTO: 11.7 FL (ref 7–12)
POTASSIUM SERPL-SCNC: 3.2 MMOL/L (ref 3.5–5)
POTASSIUM SERPL-SCNC: 3.2 MMOL/L (ref 3.5–5)
PROTHROMBIN TIME: 28.1 SEC (ref 9.3–12.4)
RBC # BLD AUTO: 4.31 M/UL (ref 3.8–5.8)
RBC # BLD: ABNORMAL 10*6/UL
SODIUM SERPL-SCNC: 138 MMOL/L (ref 132–146)
SODIUM SERPL-SCNC: 142 MMOL/L (ref 132–146)
WBC OTHER # BLD: 9.5 K/UL (ref 4.5–11.5)

## 2024-08-07 PROCEDURE — 6370000000 HC RX 637 (ALT 250 FOR IP): Performed by: NURSE PRACTITIONER

## 2024-08-07 PROCEDURE — 99233 SBSQ HOSP IP/OBS HIGH 50: CPT | Performed by: INTERNAL MEDICINE

## 2024-08-07 PROCEDURE — 87040 BLOOD CULTURE FOR BACTERIA: CPT

## 2024-08-07 PROCEDURE — 2060000000 HC ICU INTERMEDIATE R&B

## 2024-08-07 PROCEDURE — 85610 PROTHROMBIN TIME: CPT

## 2024-08-07 PROCEDURE — 6370000000 HC RX 637 (ALT 250 FOR IP): Performed by: INTERNAL MEDICINE

## 2024-08-07 PROCEDURE — 99232 SBSQ HOSP IP/OBS MODERATE 35: CPT | Performed by: INTERNAL MEDICINE

## 2024-08-07 PROCEDURE — 80048 BASIC METABOLIC PNL TOTAL CA: CPT

## 2024-08-07 PROCEDURE — 36415 COLL VENOUS BLD VENIPUNCTURE: CPT

## 2024-08-07 PROCEDURE — 85025 COMPLETE CBC W/AUTO DIFF WBC: CPT

## 2024-08-07 PROCEDURE — 2580000003 HC RX 258: Performed by: STUDENT IN AN ORGANIZED HEALTH CARE EDUCATION/TRAINING PROGRAM

## 2024-08-07 PROCEDURE — 2580000003 HC RX 258: Performed by: INTERNAL MEDICINE

## 2024-08-07 PROCEDURE — 6360000002 HC RX W HCPCS: Performed by: STUDENT IN AN ORGANIZED HEALTH CARE EDUCATION/TRAINING PROGRAM

## 2024-08-07 RX ORDER — WARFARIN SODIUM 3 MG/1
6 TABLET ORAL
Status: COMPLETED | OUTPATIENT
Start: 2024-08-07 | End: 2024-08-07

## 2024-08-07 RX ADMIN — METHOCARBAMOL TABLETS 750 MG: 750 TABLET, COATED ORAL at 09:26

## 2024-08-07 RX ADMIN — METOPROLOL SUCCINATE 25 MG: 25 TABLET, EXTENDED RELEASE ORAL at 09:26

## 2024-08-07 RX ADMIN — ISOSORBIDE DINITRATE 10 MG: 10 TABLET ORAL at 20:14

## 2024-08-07 RX ADMIN — WARFARIN SODIUM 6 MG: 3 TABLET ORAL at 18:49

## 2024-08-07 RX ADMIN — SODIUM CHLORIDE, PRESERVATIVE FREE 10 ML: 5 INJECTION INTRAVENOUS at 09:27

## 2024-08-07 RX ADMIN — SODIUM CHLORIDE, PRESERVATIVE FREE 10 ML: 5 INJECTION INTRAVENOUS at 20:17

## 2024-08-07 RX ADMIN — MIDODRINE HYDROCHLORIDE 5 MG: 5 TABLET ORAL at 18:49

## 2024-08-07 RX ADMIN — BUSPIRONE HYDROCHLORIDE 5 MG: 5 TABLET ORAL at 15:00

## 2024-08-07 RX ADMIN — WATER 2000 MG: 1 INJECTION INTRAMUSCULAR; INTRAVENOUS; SUBCUTANEOUS at 20:32

## 2024-08-07 RX ADMIN — MIRTAZAPINE 7.5 MG: 15 TABLET, FILM COATED ORAL at 20:13

## 2024-08-07 RX ADMIN — PANTOPRAZOLE SODIUM 40 MG: 40 TABLET, DELAYED RELEASE ORAL at 18:49

## 2024-08-07 RX ADMIN — METHOCARBAMOL TABLETS 750 MG: 750 TABLET, COATED ORAL at 20:14

## 2024-08-07 RX ADMIN — MIDODRINE HYDROCHLORIDE 5 MG: 5 TABLET ORAL at 09:25

## 2024-08-07 RX ADMIN — BUSPIRONE HYDROCHLORIDE 5 MG: 5 TABLET ORAL at 09:26

## 2024-08-07 RX ADMIN — TAMSULOSIN HYDROCHLORIDE 0.4 MG: 0.4 CAPSULE ORAL at 20:14

## 2024-08-07 RX ADMIN — METOLAZONE 5 MG: 5 TABLET ORAL at 09:26

## 2024-08-07 RX ADMIN — OXYCODONE HYDROCHLORIDE 10 MG: 5 TABLET ORAL at 18:48

## 2024-08-07 RX ADMIN — ISOSORBIDE DINITRATE 10 MG: 10 TABLET ORAL at 09:26

## 2024-08-07 RX ADMIN — METOPROLOL SUCCINATE 25 MG: 25 TABLET, EXTENDED RELEASE ORAL at 20:13

## 2024-08-07 RX ADMIN — RIFAXIMIN 550 MG: 550 TABLET ORAL at 09:25

## 2024-08-07 RX ADMIN — LACTULOSE 20 G: 10 SOLUTION ORAL at 09:25

## 2024-08-07 RX ADMIN — RIFAXIMIN 550 MG: 550 TABLET ORAL at 20:13

## 2024-08-07 RX ADMIN — MIDODRINE HYDROCHLORIDE 5 MG: 5 TABLET ORAL at 12:07

## 2024-08-07 RX ADMIN — Medication 100 MG: at 09:26

## 2024-08-07 RX ADMIN — ATORVASTATIN CALCIUM 20 MG: 20 TABLET, FILM COATED ORAL at 20:13

## 2024-08-07 RX ADMIN — BUSPIRONE HYDROCHLORIDE 5 MG: 5 TABLET ORAL at 20:14

## 2024-08-07 RX ADMIN — Medication 1000 UNITS: at 09:25

## 2024-08-07 RX ADMIN — ASPIRIN 81 MG CHEWABLE TABLET 81 MG: 81 TABLET CHEWABLE at 09:26

## 2024-08-07 RX ADMIN — FOLIC ACID 1 MG: 1 TABLET ORAL at 09:26

## 2024-08-07 RX ADMIN — OXYCODONE HYDROCHLORIDE 10 MG: 5 TABLET ORAL at 09:26

## 2024-08-07 RX ADMIN — PANTOPRAZOLE SODIUM 40 MG: 40 TABLET, DELAYED RELEASE ORAL at 05:43

## 2024-08-07 ASSESSMENT — PAIN DESCRIPTION - ORIENTATION: ORIENTATION: LEFT

## 2024-08-07 ASSESSMENT — PAIN SCALES - GENERAL
PAINLEVEL_OUTOF10: 7
PAINLEVEL_OUTOF10: 7

## 2024-08-07 ASSESSMENT — PAIN DESCRIPTION - LOCATION: LOCATION: CHEST

## 2024-08-07 NOTE — PROGRESS NOTES
[facility] Cardiology Inpatient Progress Note    Patient is a 59 y.o. male of Jama Skinner PA seen in hospital follow up.     Chief complaint: CHF    HPI: Some SOB. No CP.     Patient Active Problem List   Diagnosis    Chest pain    Closed fracture of nasal bones    Injury of face    Retroperitoneal hematoma    Vitamin D deficiency    Acute chest pain    Acute cholecystitis    Gastrointestinal hemorrhage    Polyp of colon    Right upper quadrant abdominal pain    Diarrhea    Current moderate episode of major depressive disorder without prior episode (HCC)    Hypertension    H/O mitral valve replacement with mechanical valve    H/O mechanical aortic valve replacement    Coronary artery disease involving native coronary artery of native heart without angina pectoris    Paravalvular leak (prosthetic valve)    Hx of CABG    Warfarin anticoagulation    Supratherapeutic INR    Moderate obesity    Dyspnea    Alcohol withdrawal syndrome without complication (HCC)    DTs (delirium tremens) (HCC)    Anemia    Severe protein-calorie malnutrition (HCC)    Acute decompensated heart failure (HCC)    Aortic valve disease    Mitral valve disease    AV block    Pure hypercholesterolemia    Chronic obstructive pulmonary disease (HCC)    Loculated pleural effusion    Troponin level elevated    Stage 2 chronic kidney disease    Alcohol use    Breast swelling    Pleural effusion    Hypophosphatemia    Acute congestive heart failure (HCC)    FRANTZ (acute kidney injury) (HCC)    Acidosis    Hyperglycemia    Hyperlipidemia    Alcoholic liver disease, unspecified (HCC)    Thrombocytopenia (HCC)    Delirium    Acute blood loss anemia    Heart failure with mid-range ejection fraction (HFmEF) (HCC)    Acute on chronic combined systolic and diastolic congestive heart failure (HCC)    Normally functioning cardiac pacemaker present    Hepatic cirrhosis (HCC)    Acute on chronic systolic (congestive) heart failure (HCC)       No Known  "DRY EYES -- BURNING OR DEVYN SYMPTOMS:  Use Over The Counter artificial tears as needed for dry eye symptoms.   Some common brands include:  Systane, Optive, Refresh, and Thera-Tears.  These drops can be used as frequently as desired, but may be most helpful use during long periods of concentrated work.  For example, reading / working at the computer. Start with 3-4x per day.     Nighttime Ophthalmic gel or ointments are available: Refresh PM, Genteal, and Lacrilube.    Avoid drops that "get redness out" (Visine, Murine, Clear Eyes), as these may contain medication that could further irritate the eyes, especially with chronic use.    ALLERGY EYES -- ITCHING SYMPTOMS:  Over the counter medications include--Pataday, Zaditor, and Alaway.  Use as directed 1-2 drops daily for symptoms of itching / watering eyes.  These drops will not help for dry eye or exposure symptoms.    REDNESS RELIEF:  Lumify---is a good redness reliever that will not cause irritation if used chronically.           " 05:19 AM    CO2 22 08/06/2024 05:19 AM    BUN 41 08/06/2024 05:19 AM    CREATININE 2.9 08/06/2024 05:19 AM    CALCIUM 9.1 08/06/2024 05:19 AM    GFRAA >60 09/15/2022 03:08 PM    LABGLOM 24 08/06/2024 05:19 AM    LABGLOM 31 03/05/2024 05:57 AM     Magnesium:    Lab Results   Component Value Date/Time    MG 1.9 08/06/2024 05:19 AM     Cardiac Enzymes:   Lab Results   Component Value Date    TROPHS 42 (H) 08/02/2024    TROPHS 45 (H) 08/02/2024    TROPHS 38 (H) 05/09/2024      PT/INR:    Lab Results   Component Value Date/Time    PROTIME 28.1 08/07/2024 05:52 AM    PROTIME 96.0 09/15/2022 01:07 PM    INR 2.6 08/07/2024 05:52 AM     TSH:    Lab Results   Component Value Date/Time    TSH 0.95 05/08/2024 02:24 AM     [facility] CONCLUSIONS 3/2023:  1. Single vessel disease.  2. Patent LIMA to LAD.     TTE 5/10/24 KA:    Left Ventricle: The EF by visual approximation is 40 - 45%. Findings consistent with moderate concentric hypertrophy. Elevated left ventricular filling pressure.    Right Ventricle: Right ventricle size is normal. Reduced systolic function.    Aortic Valve: Not well visualized. Mechanical valve. AV mean gradient is 12 mmHg. LVOT:AV VTI Index is 0.34. AV area by continuity VTI is 1.1 cm2. AV area by peak velocity is 1.0 cm2.    Mitral Valve: Not well visualized. Mechanical valve. MV mean gradient is 4 mmHg.    Tricuspid Valve: Moderate regurgitation.    Pulmonic Valve: The pulmonic valve was not well visualized.    Left Atrium: Left atrium is severely dilated.    Image quality is technically difficult. Contrast used: Definity. Technically difficult study with poor endocardial visualization, technically difficult study due to patient's body habitus and procedure performed with the patient in a supine position.    ASSESSMENT & PLAN:    Patient Active Problem List   Diagnosis    Chest pain    Closed fracture of nasal bones    Injury of face    Retroperitoneal hematoma    Vitamin D deficiency    Acute chest pain

## 2024-08-07 NOTE — PROGRESS NOTES
Pharmacy Consultation Note  (Warfarin Dosing and Monitoring)    Initial consult date: 8/2  Consulting Provider: Latanya Dorsey is a 59 y.o. male for whom pharmacy has been asked to manage warfarin therapy.     Weight:   Wt Readings from Last 1 Encounters:   08/07/24 101.2 kg (223 lb)       TSH:    Lab Results   Component Value Date/Time    TSH 0.95 05/08/2024 02:24 AM       Hepatic Function Panel:                            Lab Results   Component Value Date/Time    ALKPHOS 142 08/03/2024 03:27 AM    ALT 9 08/03/2024 03:27 AM    AST 16 08/03/2024 03:27 AM    BILITOT 1.1 08/03/2024 03:27 AM    BILIDIR 0.2 07/14/2023 05:43 PM    IBILI 0.2 07/14/2023 05:43 PM       Current significant warfarin drug-drug interactions include: No significant interactions    Recent Labs     08/05/24  0730 08/07/24  0552   HGB 8.9* 10.1*     --        Date Warfarin Dose INR Heparin or LMWH Comment   8/3 HOLD 5 Lovenox DC'd    8/4 HOLD 4 --    8/5 4 mg 3.1 --    8/6 6 mg 2.6 --    8/7 6 mg 2.6 --             Assessment:  Patient is a 59 y.o. male on warfarin for Atrial Fibrillation, Mechanical Heart Valve (mitral), and Mechanical Heart Valve (atrial).  Patient's home warfarin dosing regimen is 4mg daily per med rec but patient filled 3mg tabs most recently.   INR possibly elevated in setting of liver dysfunction.  Goal INR 2.5 - 3.5  INR 2.6 today    Plan:  Warfarin 6 mg tonight.   Daily PT/INR until the INR is stable within the therapeutic range.  Pharmacist will follow and monitor/adjust dosing as necessary.    Thank you for this consult,    Jamie Nolen, PharmD 8/7/2024 3:19 PM   Ext: [id number]

## 2024-08-07 NOTE — PROGRESS NOTES
8/7/2024 12:27 PM  Len Dorsey  [id number]    Subjective:      Sitting up in chair  Denies pain today  Culver still in place, draining appropriately    Review of Systems  Constitutional: No fever or chills   Respiratory: negative for cough and hemoptysis  Cardiovascular: negative for chest pain and dyspnea  Gastrointestinal: negative for abdominal pain, diarrhea, nausea and vomiting   : See above  Derm: negative for rash and skin lesion(s)  Neurological: negative for seizures and tremors  Musculoskeletal: Negative    Psychiatric: Negative   All other reviews are negative      Scheduled Meds:   tamsulosin  0.4 mg Oral Nightly    metOLazone  5 mg Oral Daily    warfarin placeholder: dosing by pharmacy   Other RX Placeholder    [Held by provider] bumetanide  1 mg IntraVENous BID    methocarbamol  750 mg Oral 4x Daily    aspirin  81 mg Oral Daily    atorvastatin  20 mg Oral Nightly    metoprolol succinate  25 mg Oral BID    Vitamin D  1,000 Units Oral Daily    pantoprazole  40 mg Oral BID AC    lactulose  20 g Oral Daily    busPIRone  5 mg Oral TID    folic acid  1 mg Oral Daily    [Held by provider] hydrALAZINE  10 mg Oral 3 times per day    isosorbide dinitrate  10 mg Oral TID    midodrine  5 mg Oral TID WC    mirtazapine  7.5 mg Oral Nightly    nicotine  1 patch TransDERmal Daily    thiamine  100 mg Oral Daily    rifAXIMin  550 mg Oral BID    sodium chloride flush  5-40 mL IntraVENous 2 times per day       Objective:  Vitals:    08/07/24 1121   BP: (!) 89/56   Pulse: 81   Resp: 18   Temp: 97.7 °F (36.5 °C)   SpO2: 96%         Allergies: Patient has no known allergies.    General Appearance: alert and oriented to person, place and time and in no acute distress  Skin: no rash or erythema  Head: normocephalic and atraumatic  Pulmonary/Chest: normal air movement, no respiratory distress  Abdomen: soft, non-tender, non-distended  Genitourinary: Culver  Extremities: no cyanosis, clubbing or edema         Labs:

## 2024-08-07 NOTE — PROGRESS NOTES
Voiding trial initiated.  DTV [id number].   no abdominal pain, no bloating, no constipation, no diarrhea, no nausea and no vomiting.

## 2024-08-07 NOTE — CONSULTS
[facility]  [facility]    Consultation Note     Admit Date: 8/2/2024 12:11 PM    Reason for Consult:   cellulitis    Attending Physician:  Nikhil Preston DO     Chief Complaint: Right leg redness and shortness of breath and swelling soft    HISTORY OF PRESENT ILLNESS:   The patient is a 59 y.o.  male not known to the Infectious Diseases service. The patient has hx of CAD, s/p CABG, hx of MV, AV replacement presented to ER with SOB and leg swelling for the past few months.  He is on Bumex drip.  Right leg and foot redness started 3 days ago.  No fever or chills.  He is he is complaining of left breast swelling also.  No diarrhea.    Since admission pt is afebrile HS stable on RA, labs showed normal white count througout the admission. Hg was 10.1 platelet count is 183, bnp was 1400, cr is increasing today is 3. UA clean. No cultures. Patient is not on any antibiotics I got consulted for antibiotic management.    Past Medical History:        Diagnosis Date    Alcoholic liver disease (HCC)     H/O prosthetic aortic valve replacement 09/2020    [facility]  Mitral and pacemaker at this time as well    History of mitral valve replacement 09/2020    at [facility]  Aortic Valve placed at this time as well as pacemaker    Stroke (HCC)     Testicular cancer (HCC)     in remission since 1988       Past Surgical History:        Procedure Laterality Date    COLONOSCOPY N/A 8/24/2022    COLONOSCOPY POLYPECTOMY HOT BIOPSY performed [place] Rene Lorenzo [place] [facility]    FRACTURE SURGERY      Left Tibial Plateau Fracture 3/29/2022     PACEMAKER PLACEMENT  09/2020    UPPER GASTROINTESTINAL ENDOSCOPY N/A 8/24/2022    EGD DIAGNOSTIC ONLY performed by Rene Lorenzo MD [place] [facility]    UPPER GASTROINTESTINAL ENDOSCOPY N/A 5/9/2024    ESOPHAGOGASTRODUODENOSCOPY          +++AVAIL 1430+++ performed by Min Mcdaniels DO at [facility]       Current Medications:   Scheduled Meds:       K 3.2 08/07/2024 12:56 PM    K 3.9 09/15/2022 03:08 PM    CL 98 08/07/2024 12:56 PM    CO2 27 08/07/2024 12:56 PM    BUN 47 08/07/2024 12:56 PM    CREATININE 3.0 08/07/2024 12:56 PM    GFRAA >60 09/15/2022 03:08 PM    LABGLOM 23 08/07/2024 12:56 PM    LABGLOM 31 03/05/2024 05:57 AM    GLUCOSE 95 08/07/2024 12:56 PM    CALCIUM 9.4 08/07/2024 12:56 PM    BILITOT 1.1 08/03/2024 03:27 AM    ALKPHOS 142 08/03/2024 03:27 AM    AST 16 08/03/2024 03:27 AM    ALT 9 08/03/2024 03:27 AM       Radiology:  Vascular duplex lower extremity venous right   Final Result   No evidence of DVT in the right lower extremity.         XR CHEST PORTABLE   Final Result   Cardiomegaly with underlying chronic interstitial changes seen in the lung   fields. No new focal parenchymal opacification present.             Microbiology:      Assessment:  Right leg cellulitis:  Left breast swelling    Plan:    Blood cultures x 2  Started on IV cefazolin 2 g every 8  Ultrasound left breast  Monitor labs  Will follow with you    Thank you for having us see this patient in consultation. I will be discussing this case with the treating physicians.      Electronically signed by DAT ARANA MD on 8/7/2024 at 2:46 PM

## 2024-08-07 NOTE — PROGRESS NOTES
[facility] Hospitalist   Progress Note    Admitting Date and Time: 8/2/2024 12:11 PM  Admit Dx: Anemia [D64.9]  Anemia, unspecified type [D64.9]    Subjective:    Patient was admitted with Anemia [D64.9]  Anemia, unspecified type [D64.9]. Patient denies fever, chills, cp, sob, n/v.     ceFAZolin  2,000 mg IntraVENous Q8H    warfarin  6 mg Oral Once    tamsulosin  0.4 mg Oral Nightly    [Held by provider] metOLazone  5 mg Oral Daily    warfarin placeholder: dosing by pharmacy   Other RX Placeholder    [Held by provider] bumetanide  1 mg IntraVENous BID    methocarbamol  750 mg Oral 4x Daily    aspirin  81 mg Oral Daily    atorvastatin  20 mg Oral Nightly    metoprolol succinate  25 mg Oral BID    Vitamin D  1,000 Units Oral Daily    pantoprazole  40 mg Oral BID AC    lactulose  20 g Oral Daily    busPIRone  5 mg Oral TID    folic acid  1 mg Oral Daily    [Held by provider] hydrALAZINE  10 mg Oral 3 times per day    isosorbide dinitrate  10 mg Oral TID    midodrine  5 mg Oral TID WC    mirtazapine  7.5 mg Oral Nightly    nicotine  1 patch TransDERmal Daily    thiamine  100 mg Oral Daily    rifAXIMin  550 mg Oral BID    sodium chloride flush  5-40 mL IntraVENous 2 times per day     oxyCODONE, 5 mg, Q6H PRN  oxyCODONE, 10 mg, Q6H PRN  sodium chloride, , PRN  sodium chloride flush, 5-40 mL, PRN  sodium chloride, , PRN  potassium chloride, 40 mEq, PRN   Or  potassium alternative oral replacement, 40 mEq, PRN   Or  potassium chloride, 10 mEq, PRN  magnesium sulfate, 2,000 mg, PRN  polyethylene glycol, 17 g, Daily PRN  prochlorperazine, 10 mg, Q8H PRN   Or  prochlorperazine, 10 mg, Q6H PRN         Objective:    BP 99/69   Pulse 81   Temp 97.7 °F (36.5 °C) (Oral)   Resp 18   Ht 1.778 m (5' 10\")   Wt 101.2 kg (223 lb)   SpO2 96%   BMI 32.00 kg/m²   Skin: warm and dry, no rash or erythema  Pulmonary/Chest: clear to auscultation bilaterally- no wheezes, rales or rhonchi, normal air movement, no

## 2024-08-07 NOTE — PROGRESS NOTES
Department of Internal Medicine  Nephrology Progress Note    Events reviewed     SUBJECTIVE:   We are following for FRANTZ and fluid overload. He has no complaints today.    PHYSICAL EXAM:      Vitals:    VITALS:  BP 99/69   Pulse 81   Temp 97.7 °F (36.5 °C) (Oral)   Resp 18   Ht 1.778 m (5' 10\")   Wt 101.2 kg (223 lb)   SpO2 96%   BMI 32.00 kg/m²   24HR BLOOD PRESSURE RANGE:  Systolic (24hrs), Av , Min:89 , Max:119   ; Diastolic (24hrs), Av, Min:32, Max:69    24HR INTAKE/OUTPUT:    Intake/Output Summary (Last 24 hours) at 2024 1617  Last data filed at 2024 1345  Gross per 24 hour   Intake 1028 ml   Output 5650 ml   Net -4622 ml       Constitutional:  awake NAD  HEENT:  EOMI  Respiratory:  CTA  Cardiovascular/Edema:  S1 S2  Gastrointestinal:  +BS  Neurologic:  no deficits  Skin:  warm dry  Other:  BLE ++edema     DATA:    CBC with Differential:    Lab Results   Component Value Date/Time    WBC 9.5 2024 05:52 AM    RBC 4.31 2024 05:52 AM    HGB 10.1 2024 05:52 AM    HCT 35.6 2024 05:52 AM     2024 07:30 AM    MCV 82.6 2024 05:52 AM    MCH 23.4 2024 05:52 AM    MCHC 28.4 2024 05:52 AM    RDW 21.6 2024 05:52 AM    NRBC 1 2024 05:52 AM    METASPCT DUPLICATE 2023 04:12 AM    LYMPHOPCT 9 2024 05:52 AM    PROMYELOPCT DUPLICATE 2023 04:12 AM    MONOPCT 7 2024 05:52 AM    MYELOPCT DUPLICATE 2023 04:12 AM    EOSPCT 2 2024 05:52 AM    BASOPCT 1 2024 05:52 AM    MONOSABS 0.67 2024 05:52 AM    LYMPHSABS 0.83 2024 05:52 AM    EOSABS 0.17 2024 05:52 AM    BASOSABS 0.08 2024 05:52 AM     CMP:    Lab Results   Component Value Date/Time     2024 12:56 PM    K 3.2 2024 12:56 PM    K 3.9 09/15/2022 03:08 PM    CL 98 2024 12:56 PM    CO2 27 2024 12:56 PM    BUN 47 2024 12:56 PM    CREATININE 3.0 2024 12:56 PM    GFRAA >60 09/15/2022 03:08 PM

## 2024-08-07 NOTE — PROGRESS NOTES
BMP results sent to Dr. Strong covering for the ordering physician Dr. Joiner.    No intervention at this time.  K+ replacement contraindicated due to elevated Cr.

## 2024-08-07 NOTE — CARE COORDINATION
Social work / Discharge planning:          Per IDR, patient remains on Bumex drip and room air.       Discharge plan is home.     Patient is over income for assistance with medications.      Physician updated.   Electronically signed by ANJEL Hernandez on 8/7/2024 at 10:24 AM

## 2024-08-08 LAB
ANION GAP SERPL CALCULATED.3IONS-SCNC: 19 MMOL/L (ref 7–16)
ANION GAP SERPL CALCULATED.3IONS-SCNC: 19 MMOL/L (ref 7–16)
BASOPHILS # BLD: 0.07 K/UL (ref 0–0.2)
BASOPHILS NFR BLD: 1 % (ref 0–2)
BUN SERPL-MCNC: 57 MG/DL (ref 6–20)
BUN SERPL-MCNC: 61 MG/DL (ref 6–20)
CALCIUM SERPL-MCNC: 9 MG/DL (ref 8.6–10.2)
CALCIUM SERPL-MCNC: 9.4 MG/DL (ref 8.6–10.2)
CHLORIDE SERPL-SCNC: 94 MMOL/L (ref 98–107)
CHLORIDE SERPL-SCNC: 94 MMOL/L (ref 98–107)
CO2 SERPL-SCNC: 24 MMOL/L (ref 22–29)
CO2 SERPL-SCNC: 25 MMOL/L (ref 22–29)
CREAT SERPL-MCNC: 3.1 MG/DL (ref 0.7–1.2)
CREAT SERPL-MCNC: 3.5 MG/DL (ref 0.7–1.2)
EOSINOPHIL # BLD: 0.42 K/UL (ref 0.05–0.5)
EOSINOPHILS RELATIVE PERCENT: 4 % (ref 0–6)
ERYTHROCYTE [DISTWIDTH] IN BLOOD BY AUTOMATED COUNT: 22 % (ref 11.5–15)
GFR, ESTIMATED: 20 ML/MIN/1.73M2
GFR, ESTIMATED: 23 ML/MIN/1.73M2
GLUCOSE SERPL-MCNC: 116 MG/DL (ref 74–99)
GLUCOSE SERPL-MCNC: 120 MG/DL (ref 74–99)
HCT VFR BLD AUTO: 36 % (ref 37–54)
HGB BLD-MCNC: 10.3 G/DL (ref 12.5–16.5)
IMM GRANULOCYTES # BLD AUTO: 0.04 K/UL (ref 0–0.58)
IMM GRANULOCYTES NFR BLD: 0 % (ref 0–5)
INR PPP: 3.7
LYMPHOCYTES NFR BLD: 0.9 K/UL (ref 1.5–4)
LYMPHOCYTES RELATIVE PERCENT: 8 % (ref 20–42)
MAGNESIUM SERPL-MCNC: 1.6 MG/DL (ref 1.6–2.6)
MAGNESIUM SERPL-MCNC: 1.7 MG/DL (ref 1.6–2.6)
MCH RBC QN AUTO: 23.4 PG (ref 26–35)
MCHC RBC AUTO-ENTMCNC: 28.6 G/DL (ref 32–34.5)
MCV RBC AUTO: 81.8 FL (ref 80–99.9)
MONOCYTES NFR BLD: 1.43 K/UL (ref 0.1–0.95)
MONOCYTES NFR BLD: 13 % (ref 2–12)
NEUTROPHILS NFR BLD: 74 % (ref 43–80)
NEUTS SEG NFR BLD: 8.33 K/UL (ref 1.8–7.3)
PLATELET, FLUORESCENCE: 188 K/UL (ref 130–450)
PMV BLD AUTO: ABNORMAL FL (ref 7–12)
POTASSIUM SERPL-SCNC: 2.7 MMOL/L (ref 3.5–5)
POTASSIUM SERPL-SCNC: 3 MMOL/L (ref 3.5–5)
PROTHROMBIN TIME: 40 SEC (ref 9.3–12.4)
RBC # BLD AUTO: 4.4 M/UL (ref 3.8–5.8)
RBC # BLD: ABNORMAL 10*6/UL
SODIUM SERPL-SCNC: 137 MMOL/L (ref 132–146)
SODIUM SERPL-SCNC: 138 MMOL/L (ref 132–146)
WBC OTHER # BLD: 11.2 K/UL (ref 4.5–11.5)

## 2024-08-08 PROCEDURE — 51798 US URINE CAPACITY MEASURE: CPT

## 2024-08-08 PROCEDURE — 85610 PROTHROMBIN TIME: CPT

## 2024-08-08 PROCEDURE — 83735 ASSAY OF MAGNESIUM: CPT

## 2024-08-08 PROCEDURE — 6370000000 HC RX 637 (ALT 250 FOR IP): Performed by: NURSE PRACTITIONER

## 2024-08-08 PROCEDURE — 6370000000 HC RX 637 (ALT 250 FOR IP): Performed by: INTERNAL MEDICINE

## 2024-08-08 PROCEDURE — 2580000003 HC RX 258: Performed by: INTERNAL MEDICINE

## 2024-08-08 PROCEDURE — 99232 SBSQ HOSP IP/OBS MODERATE 35: CPT | Performed by: INTERNAL MEDICINE

## 2024-08-08 PROCEDURE — 80048 BASIC METABOLIC PNL TOTAL CA: CPT

## 2024-08-08 PROCEDURE — 6360000002 HC RX W HCPCS: Performed by: INTERNAL MEDICINE

## 2024-08-08 PROCEDURE — 85025 COMPLETE CBC W/AUTO DIFF WBC: CPT

## 2024-08-08 PROCEDURE — 6370000000 HC RX 637 (ALT 250 FOR IP)

## 2024-08-08 PROCEDURE — 2060000000 HC ICU INTERMEDIATE R&B

## 2024-08-08 PROCEDURE — 6360000002 HC RX W HCPCS: Performed by: STUDENT IN AN ORGANIZED HEALTH CARE EDUCATION/TRAINING PROGRAM

## 2024-08-08 PROCEDURE — 2580000003 HC RX 258: Performed by: STUDENT IN AN ORGANIZED HEALTH CARE EDUCATION/TRAINING PROGRAM

## 2024-08-08 PROCEDURE — 99233 SBSQ HOSP IP/OBS HIGH 50: CPT | Performed by: INTERNAL MEDICINE

## 2024-08-08 PROCEDURE — 36415 COLL VENOUS BLD VENIPUNCTURE: CPT

## 2024-08-08 PROCEDURE — 51702 INSERT TEMP BLADDER CATH: CPT

## 2024-08-08 RX ORDER — BUMETANIDE 1 MG/1
2 TABLET ORAL 2 TIMES DAILY
Status: DISCONTINUED | OUTPATIENT
Start: 2024-08-09 | End: 2024-08-14

## 2024-08-08 RX ORDER — POTASSIUM CHLORIDE 7.45 MG/ML
10 INJECTION INTRAVENOUS
Status: DISPENSED | OUTPATIENT
Start: 2024-08-08 | End: 2024-08-08

## 2024-08-08 RX ORDER — POTASSIUM CHLORIDE 1500 MG/1
20 TABLET, EXTENDED RELEASE ORAL ONCE
Status: COMPLETED | OUTPATIENT
Start: 2024-08-08 | End: 2024-08-08

## 2024-08-08 RX ORDER — POTASSIUM CHLORIDE 1500 MG/1
40 TABLET, EXTENDED RELEASE ORAL ONCE
Status: COMPLETED | OUTPATIENT
Start: 2024-08-08 | End: 2024-08-08

## 2024-08-08 RX ORDER — WARFARIN SODIUM 2 MG/1
2 TABLET ORAL
Status: COMPLETED | OUTPATIENT
Start: 2024-08-08 | End: 2024-08-08

## 2024-08-08 RX ADMIN — METHOCARBAMOL TABLETS 750 MG: 750 TABLET, COATED ORAL at 08:41

## 2024-08-08 RX ADMIN — FOLIC ACID 1 MG: 1 TABLET ORAL at 08:42

## 2024-08-08 RX ADMIN — BUSPIRONE HYDROCHLORIDE 5 MG: 5 TABLET ORAL at 08:42

## 2024-08-08 RX ADMIN — BUSPIRONE HYDROCHLORIDE 5 MG: 5 TABLET ORAL at 13:38

## 2024-08-08 RX ADMIN — WATER 2000 MG: 1 INJECTION INTRAMUSCULAR; INTRAVENOUS; SUBCUTANEOUS at 05:16

## 2024-08-08 RX ADMIN — METOPROLOL SUCCINATE 25 MG: 25 TABLET, EXTENDED RELEASE ORAL at 20:40

## 2024-08-08 RX ADMIN — MIDODRINE HYDROCHLORIDE 5 MG: 5 TABLET ORAL at 11:36

## 2024-08-08 RX ADMIN — SODIUM CHLORIDE, PRESERVATIVE FREE 10 ML: 5 INJECTION INTRAVENOUS at 08:42

## 2024-08-08 RX ADMIN — WARFARIN SODIUM 2 MG: 2 TABLET ORAL at 16:47

## 2024-08-08 RX ADMIN — WATER 2000 MG: 1 INJECTION INTRAMUSCULAR; INTRAVENOUS; SUBCUTANEOUS at 11:36

## 2024-08-08 RX ADMIN — BUSPIRONE HYDROCHLORIDE 5 MG: 5 TABLET ORAL at 20:41

## 2024-08-08 RX ADMIN — POTASSIUM CHLORIDE 40 MEQ: 1500 TABLET, EXTENDED RELEASE ORAL at 14:06

## 2024-08-08 RX ADMIN — Medication 1000 UNITS: at 08:42

## 2024-08-08 RX ADMIN — METHOCARBAMOL TABLETS 750 MG: 750 TABLET, COATED ORAL at 16:47

## 2024-08-08 RX ADMIN — SODIUM CHLORIDE, PRESERVATIVE FREE 10 ML: 5 INJECTION INTRAVENOUS at 20:41

## 2024-08-08 RX ADMIN — ISOSORBIDE DINITRATE 10 MG: 10 TABLET ORAL at 13:38

## 2024-08-08 RX ADMIN — OXYCODONE HYDROCHLORIDE 10 MG: 5 TABLET ORAL at 11:36

## 2024-08-08 RX ADMIN — Medication 100 MG: at 08:42

## 2024-08-08 RX ADMIN — POTASSIUM CHLORIDE 40 MEQ: 1500 TABLET, EXTENDED RELEASE ORAL at 11:36

## 2024-08-08 RX ADMIN — MIDODRINE HYDROCHLORIDE 5 MG: 5 TABLET ORAL at 08:42

## 2024-08-08 RX ADMIN — ATORVASTATIN CALCIUM 20 MG: 20 TABLET, FILM COATED ORAL at 20:41

## 2024-08-08 RX ADMIN — PANTOPRAZOLE SODIUM 40 MG: 40 TABLET, DELAYED RELEASE ORAL at 05:16

## 2024-08-08 RX ADMIN — OXYCODONE HYDROCHLORIDE 10 MG: 5 TABLET ORAL at 20:47

## 2024-08-08 RX ADMIN — LACTULOSE 20 G: 10 SOLUTION ORAL at 08:42

## 2024-08-08 RX ADMIN — RIFAXIMIN 550 MG: 550 TABLET ORAL at 08:41

## 2024-08-08 RX ADMIN — METHOCARBAMOL TABLETS 750 MG: 750 TABLET, COATED ORAL at 13:38

## 2024-08-08 RX ADMIN — ISOSORBIDE DINITRATE 10 MG: 10 TABLET ORAL at 20:41

## 2024-08-08 RX ADMIN — POTASSIUM CHLORIDE 10 MEQ: 7.46 INJECTION, SOLUTION INTRAVENOUS at 13:41

## 2024-08-08 RX ADMIN — ISOSORBIDE DINITRATE 10 MG: 10 TABLET ORAL at 08:42

## 2024-08-08 RX ADMIN — MIRTAZAPINE 7.5 MG: 15 TABLET, FILM COATED ORAL at 20:40

## 2024-08-08 RX ADMIN — POTASSIUM CHLORIDE 20 MEQ: 1500 TABLET, EXTENDED RELEASE ORAL at 16:47

## 2024-08-08 RX ADMIN — METOPROLOL SUCCINATE 25 MG: 25 TABLET, EXTENDED RELEASE ORAL at 08:42

## 2024-08-08 RX ADMIN — METHOCARBAMOL TABLETS 750 MG: 750 TABLET, COATED ORAL at 20:41

## 2024-08-08 RX ADMIN — WATER 2000 MG: 1 INJECTION INTRAMUSCULAR; INTRAVENOUS; SUBCUTANEOUS at 20:40

## 2024-08-08 RX ADMIN — ASPIRIN 81 MG CHEWABLE TABLET 81 MG: 81 TABLET CHEWABLE at 08:41

## 2024-08-08 RX ADMIN — PANTOPRAZOLE SODIUM 40 MG: 40 TABLET, DELAYED RELEASE ORAL at 16:47

## 2024-08-08 RX ADMIN — RIFAXIMIN 550 MG: 550 TABLET ORAL at 20:41

## 2024-08-08 RX ADMIN — TAMSULOSIN HYDROCHLORIDE 0.4 MG: 0.4 CAPSULE ORAL at 20:41

## 2024-08-08 RX ADMIN — MIDODRINE HYDROCHLORIDE 5 MG: 5 TABLET ORAL at 16:47

## 2024-08-08 RX ADMIN — OXYCODONE HYDROCHLORIDE 10 MG: 5 TABLET ORAL at 05:16

## 2024-08-08 ASSESSMENT — PAIN SCALES - GENERAL
PAINLEVEL_OUTOF10: 8
PAINLEVEL_OUTOF10: 5
PAINLEVEL_OUTOF10: 4
PAINLEVEL_OUTOF10: 8
PAINLEVEL_OUTOF10: 8

## 2024-08-08 ASSESSMENT — PAIN DESCRIPTION - LOCATION
LOCATION: BACK
LOCATION: BACK

## 2024-08-08 ASSESSMENT — PAIN DESCRIPTION - DESCRIPTORS
DESCRIPTORS: ACHING;DISCOMFORT;SORE;TENDER
DESCRIPTORS: DISCOMFORT;TENDER;SORE

## 2024-08-08 ASSESSMENT — PAIN DESCRIPTION - ONSET
ONSET: ON-GOING
ONSET: ON-GOING

## 2024-08-08 ASSESSMENT — PAIN DESCRIPTION - PAIN TYPE
TYPE: CHRONIC PAIN
TYPE: CHRONIC PAIN

## 2024-08-08 ASSESSMENT — PAIN DESCRIPTION - ORIENTATION
ORIENTATION: LOWER
ORIENTATION: LOWER

## 2024-08-08 ASSESSMENT — PAIN DESCRIPTION - FREQUENCY
FREQUENCY: CONTINUOUS
FREQUENCY: CONTINUOUS

## 2024-08-08 NOTE — PLAN OF CARE
Problem: ABCDS Injury Assessment  Goal: Absence of physical injury  8/8/2024 1046 by Elana Squires, RN  Outcome: Progressing  8/8/2024 0030 by Zohra Reynoso RN  Outcome: Progressing     Problem: Discharge Planning  Goal: Discharge to home or other facility with appropriate resources  8/8/2024 1046 by Elana Squires, RN  Outcome: Progressing  Flowsheets (Taken 8/8/2024 0800)  Discharge to home or other facility with appropriate resources:   Identify barriers to discharge with patient and caregiver   Arrange for needed discharge resources and transportation as appropriate   Identify discharge learning needs (meds, wound care, etc)   Arrange for interpreters to assist at discharge as needed   Refer to discharge planning if patient needs post-hospital services based on physician order or complex needs related to functional status, cognitive ability or social support system  8/8/2024 0030 by Zohra Reynoso RN  Outcome: Progressing     Problem: Safety - Adult  Goal: Free from fall injury  8/8/2024 1046 by Elana Squires, RN  Outcome: Progressing  8/8/2024 0030 by Zohra Reynoso RN  Outcome: Progressing     Problem: Pain  Goal: Verbalizes/displays adequate comfort level or baseline comfort level  8/8/2024 1046 by Elana Squires RN  Outcome: Progressing  Flowsheets (Taken 8/8/2024 0800)  Verbalizes/displays adequate comfort level or baseline comfort level:   Encourage patient to monitor pain and request assistance   Assess pain using appropriate pain scale   Administer analgesics based on type and severity of pain and evaluate response   Implement non-pharmacological measures as appropriate and evaluate response   Consider cultural and social influences on pain and pain management   Notify Licensed Independent Practitioner if interventions unsuccessful or patient reports new pain  8/8/2024 0030 by Zohra Reynoso, RN  Outcome: Progressing     Problem: Skin/Tissue Integrity  Goal: Absence of new skin

## 2024-08-08 NOTE — PROGRESS NOTES
Infectious Disease  Progress Note  [facility]    Chief Complaint: right leg redness.     Subjective:  he is uncomfortable because of low back pain. No fever. Tolerating antibiotics well he could not say if the redness is better.     Scheduled Meds:   potassium chloride  40 mEq Oral Once    [START ON 8/9/2024] bumetanide  2 mg Oral BID    ceFAZolin  2,000 mg IntraVENous Q8H    tamsulosin  0.4 mg Oral Nightly    [Held by provider] metOLazone  5 mg Oral Daily    warfarin placeholder: dosing by pharmacy   Other RX Placeholder    methocarbamol  750 mg Oral 4x Daily    aspirin  81 mg Oral Daily    atorvastatin  20 mg Oral Nightly    metoprolol succinate  25 mg Oral BID    Vitamin D  1,000 Units Oral Daily    pantoprazole  40 mg Oral BID AC    lactulose  20 g Oral Daily    busPIRone  5 mg Oral TID    folic acid  1 mg Oral Daily    [Held by provider] hydrALAZINE  10 mg Oral 3 times per day    isosorbide dinitrate  10 mg Oral TID    midodrine  5 mg Oral TID WC    mirtazapine  7.5 mg Oral Nightly    nicotine  1 patch TransDERmal Daily    thiamine  100 mg Oral Daily    rifAXIMin  550 mg Oral BID    sodium chloride flush  5-40 mL IntraVENous 2 times per day     Continuous Infusions:   sodium chloride      sodium chloride       PRN Meds:oxyCODONE, oxyCODONE, sodium chloride, sodium chloride flush, sodium chloride, potassium chloride **OR** potassium alternative oral replacement **OR** potassium chloride, magnesium sulfate, polyethylene glycol, prochlorperazine **OR** prochlorperazine    Prior to Admission medications    Medication Sig Start Date End Date Taking? Authorizing Provider   warfarin (COUMADIN) 3 MG tablet Take 1 tablet by mouth daily  Patient taking differently: Take 4 mg by mouth daily 5/19/24   Uzma Burgos MD   busPIRone (BUSPAR) 5 MG tablet Take 1 tablet by mouth 3 times daily 5/19/24   Uzma Burgos MD   folic acid (FOLVITE) 1 MG tablet Take 1 tablet by mouth daily 5/20/24   Uzma Burgos MD    hydrALAZINE (APRESOLINE) 10 MG tablet Take 1 tablet by mouth every 8 hours 5/19/24   Uzma Burgos MD   isosorbide dinitrate (ISORDIL) 10 MG tablet Take 1 tablet by mouth 3 times daily 5/19/24   Uzma Burgos MD   midodrine (PROAMATINE) 5 MG tablet Take 1 tablet by mouth 3 times daily (with meals) 5/19/24   zUma Burgos MD   mirtazapine (REMERON) 7.5 MG tablet Take 1 tablet by mouth nightly 5/19/24   Uzma Burgos MD   nicotine (NICODERM CQ) 21 MG/24HR Place 1 patch onto the skin daily 5/20/24   Uzma Burgos MD   thiamine 100 MG tablet Take 1 tablet by mouth daily 5/20/24   Uzma Burgos MD   pantoprazole (PROTONIX) 40 MG tablet Take 1 tablet by mouth 2 times daily (before meals) 5/10/24   Federico Riddle MD   rifAXIMin (XIFAXAN) 550 MG tablet Take 1 tablet by mouth 2 times daily 5/10/24   Federico Riddle MD   lactulose (CHRONULAC) 10 GM/15ML solution Take 30 mLs by mouth daily 5/11/24   Federico Riddle MD   aspirin 81 MG chewable tablet Take 1 tablet by mouth daily 3/6/24   Peter Olivo MD   empagliflozin (JARDIANCE) 10 MG tablet Take 1 tablet by mouth daily 3/6/24   Peter Olivo MD   atorvastatin (LIPITOR) 20 MG tablet Take 1 tablet by mouth nightly 3/5/24   Peter Olivo MD   metoprolol succinate (TOPROL XL) 25 MG extended release tablet Take 1 tablet by mouth 2 times daily 3/5/24   Peter Olivo MD   Vitamin D (CHOLECALCIFEROL) 25 MCG (1000 UT) TABS tablet Take 1 tablet by mouth daily 3/6/24   Peter Olivo MD        ROS:  As mentioned in subjective, all other systems negative      BP 97/66   Pulse 94   Temp 97.5 °F (36.4 °C) (Oral)   Resp 18   Ht 1.778 m (5' 10\")   Wt 103.9 kg (229 lb)   SpO2 95%   BMI 32.86 kg/m²     Physical Exam  Const/Neuro- unchanged, no signs of acute distress, Alert  ENMT- Within Normal Limits, Normocephalic, mucous membranes pink/moist, No thrush  Neck: Neck supple  Heart- Regular, Rate, Rhythm- no  murmur appreciated.  Lungs- clear to ascultation. Respirations even and nonlabored.  Abdomen- Soft, bowel sounds positive, non tender  Musculo/Extremities-  right leg redness is slightly better. Swelling is the same.   Neurological- No focal motor or sensory loss.  No confusion  Skin:  Warm and dry, free from rashes.  Left sided chest swelling. Has sacral tenderness  Lines: PIV    Labs, Cultures reviewed  Radiology and other consultants notes reviewed    Microbiology:  Blood cultures 8/7 in process    Assessment:  Right leg cellulitis:  Left breast swelling     Plan:    Continue IV cefazolin 2 g every 8  Ultrasound left breast-was informed that this cannot be done at [facility].  Will need to be followed up outpatient with primary care physician.  Ace wrap and elevate right foot when in bed.   Monitor labs  Will follow with you      Electronically signed by DAT ARANA MD on 8/8/2024 at 11:23 AM

## 2024-08-08 NOTE — PROGRESS NOTES
[facility] [place] Hospitalist   Progress Note    Admitting Date and Time: 8/2/2024 12:11 PM  Admit Dx: Anemia [D64.9]  Anemia, unspecified type [D64.9]    Subjective:    Patient was admitted with Anemia [D64.9]  Anemia, unspecified type [D64.9]. Patient denies fever, chills, cp, sob, n/v.     [START ON 8/9/2024] bumetanide  2 mg Oral BID    ceFAZolin  2,000 mg IntraVENous Q8H    tamsulosin  0.4 mg Oral Nightly    [Held by provider] metOLazone  5 mg Oral Daily    warfarin placeholder: dosing by pharmacy   Other RX Placeholder    methocarbamol  750 mg Oral 4x Daily    aspirin  81 mg Oral Daily    atorvastatin  20 mg Oral Nightly    metoprolol succinate  25 mg Oral BID    Vitamin D  1,000 Units Oral Daily    pantoprazole  40 mg Oral BID AC    lactulose  20 g Oral Daily    busPIRone  5 mg Oral TID    folic acid  1 mg Oral Daily    [Held by provider] hydrALAZINE  10 mg Oral 3 times per day    isosorbide dinitrate  10 mg Oral TID    midodrine  5 mg Oral TID WC    mirtazapine  7.5 mg Oral Nightly    nicotine  1 patch TransDERmal Daily    thiamine  100 mg Oral Daily    rifAXIMin  550 mg Oral BID    sodium chloride flush  5-40 mL IntraVENous 2 times per day     oxyCODONE, 5 mg, Q6H PRN  oxyCODONE, 10 mg, Q6H PRN  sodium chloride, , PRN  sodium chloride flush, 5-40 mL, PRN  sodium chloride, , PRN  potassium chloride, 40 mEq, PRN   Or  potassium alternative oral replacement, 40 mEq, PRN   Or  potassium chloride, 10 mEq, PRN  magnesium sulfate, 2,000 mg, PRN  polyethylene glycol, 17 g, Daily PRN  prochlorperazine, 10 mg, Q8H PRN   Or  prochlorperazine, 10 mg, Q6H PRN         Objective:    /63   Pulse 90   Temp 98.1 °F (36.7 °C) (Oral)   Resp 18   Ht 1.778 m (5' 10\")   Wt 103.9 kg (229 lb)   SpO2 98%   BMI 32.86 kg/m²   Skin: warm and dry, no rash or erythema  Pulmonary/Chest: clear to auscultation bilaterally- no wheezes, rales or rhonchi, normal air movement, no respiratory distress  Cardiovascular:

## 2024-08-08 NOTE — PROGRESS NOTES
[facility] Cardiology Inpatient Progress Note    Patient is a 59 y.o. male of Jama Skinner PA seen in hospital follow up.     Chief complaint: CHF    HPI: Some SOB. No CP.     Patient Active Problem List   Diagnosis    Chest pain    Closed fracture of nasal bones    Injury of face    Retroperitoneal hematoma    Vitamin D deficiency    Acute chest pain    Acute cholecystitis    Gastrointestinal hemorrhage    Polyp of colon    Right upper quadrant abdominal pain    Diarrhea    Current moderate episode of major depressive disorder without prior episode (HCC)    Hypertension    H/O mitral valve replacement with mechanical valve    H/O mechanical aortic valve replacement    Coronary artery disease involving native coronary artery of native heart without angina pectoris    Paravalvular leak (prosthetic valve)    Hx of CABG    Warfarin anticoagulation    Supratherapeutic INR    Moderate obesity    Dyspnea    Alcohol withdrawal syndrome without complication (HCC)    DTs (delirium tremens) (HCC)    Anemia    Severe protein-calorie malnutrition (HCC)    Acute decompensated heart failure (HCC)    Aortic valve disease    Mitral valve disease    AV block    Pure hypercholesterolemia    Chronic obstructive pulmonary disease (HCC)    Loculated pleural effusion    Troponin level elevated    Stage 2 chronic kidney disease    Alcohol use    Breast swelling    Pleural effusion    Hypophosphatemia    Acute congestive heart failure (HCC)    FRANTZ (acute kidney injury) (HCC)    Acidosis    Hyperglycemia    Hyperlipidemia    Alcoholic liver disease, unspecified (HCC)    Thrombocytopenia (HCC)    Delirium    Acute blood loss anemia    Heart failure with mid-range ejection fraction (HFmEF) (HCC)    Acute on chronic combined systolic and diastolic congestive heart failure (HCC)    Normally functioning cardiac pacemaker present    Hepatic cirrhosis (HCC)    Acute on chronic systolic (congestive) heart failure (HCC)       No Known  systems:   Heart: as above   Lungs: as above   Eyes: denies changes in vision or discharge.   Ears: denies changes in hearing or pain.   Nose: denies epistaxis or masses   Throat: denies sore throat or trouble swallowing.   Neuro: denies numbness, tingling, tremors.   Skin: denies rashes or itching.   : denies hematuria, dysuria   GI: denies vomiting, diarrhea   Psych: denies mood changed, anxiety, depression.    Physical Exam   /73   Pulse 93   Temp 97.9 °F (36.6 °C) (Axillary)   Resp 18   Ht 1.778 m (5' 10\")   Wt 103.9 kg (229 lb)   SpO2 94%   BMI 32.86 kg/m²   Constitutional: Oriented to person, place, and time. No distress.  Well developed.  Head: Normocephalic and atraumatic.    Neck: Neck supple. No hepatojugular reflux. No JVD present. Carotid bruit is not present. No tracheal deviation present. No thyromegaly present.   Cardiovascular: Normal rate, regular rhythm, normal heart sounds.  intact distal pulses.  No gallop and no friction rub.  No murmur heard.  Pulmonary: Breath sounds normal. No respiratory distress. No wheezes. No rales.   Chest: Effort normal. No tenderness.  Abdominal: Soft. Bowel sounds are normal. No distension or mass. No tenderness, rebound or guarding.   Musculoskeletal: . No tenderness. No clubbing or cyanosis.  Extremitites: Intact distal pulses. No edema  Neurological: Alert and oriented to person, place, and time.   Skin: Skin is warm and dry. No rash noted. Not diaphoretic. No erythema.   Psychiatric: Normal mood and affect. Behavior is normal.   Lymphadenopathy: No cervical adenopathy. No groin adenopathy.    CBC:   Lab Results   Component Value Date/Time    WBC 9.5 08/07/2024 05:52 AM    RBC 4.31 08/07/2024 05:52 AM    HGB 10.1 08/07/2024 05:52 AM    HCT 35.6 08/07/2024 05:52 AM    MCV 82.6 08/07/2024 05:52 AM    MCH 23.4 08/07/2024 05:52 AM    MCHC 28.4 08/07/2024 05:52 AM    RDW 21.6 08/07/2024 05:52 AM     08/05/2024 07:30 AM    MPV 11.7 08/07/2024 05:52

## 2024-08-08 NOTE — PROGRESS NOTES
Department of Internal Medicine  Nephrology Progress Note    Events reviewed     SUBJECTIVE:   We are following for FRANTZ and fluid overload. Patient reports back pain.    PHYSICAL EXAM:      Vitals:    VITALS:  /73   Pulse 93   Temp 97.9 °F (36.6 °C) (Axillary)   Resp 18   Ht 1.778 m (5' 10\")   Wt 103.9 kg (229 lb)   SpO2 94%   BMI 32.86 kg/m²   24HR BLOOD PRESSURE RANGE:  Systolic (24hrs), Av , Min:89 , Max:119   ; Diastolic (24hrs), Av, Min:32, Max:75    24HR INTAKE/OUTPUT:    Intake/Output Summary (Last 24 hours) at 2024 0924  Last data filed at 2024 0412  Gross per 24 hour   Intake 619 ml   Output 3750 ml   Net -3131 ml         Constitutional:  awake NAD  HEENT:  EOMI  Respiratory:  CTA  Cardiovascular/Edema:  S1 S2  Gastrointestinal:  +BS  Neurologic:  no deficits  Skin:  warm dry  Other:  BLE ++edema     DATA:    CBC with Differential:    Lab Results   Component Value Date/Time    WBC 11.2 2024 07:51 AM    RBC 4.40 2024 07:51 AM    HGB 10.3 2024 07:51 AM    HCT 36.0 2024 07:51 AM     2024 07:30 AM    MCV 81.8 2024 07:51 AM    MCH 23.4 2024 07:51 AM    MCHC 28.6 2024 07:51 AM    RDW 22.0 2024 07:51 AM    NRBC PENDING 2024 07:51 AM    METASPCT PENDING 2024 07:51 AM    LYMPHOPCT PENDING 2024 07:51 AM    PROMYELOPCT PENDING 2024 07:51 AM    MONOPCT PENDING 2024 07:51 AM    MYELOPCT PENDING 2024 07:51 AM    EOSPCT PENDING 2024 07:51 AM    BASOPCT PENDING 2024 07:51 AM    MONOSABS PENDING 2024 07:51 AM    LYMPHSABS PENDING 2024 07:51 AM    EOSABS PENDING 2024 07:51 AM    BASOSABS PENDING 2024 07:51 AM     CMP:    Lab Results   Component Value Date/Time     2024 05:15 PM    K 3.2 2024 05:15 PM    K 3.9 09/15/2022 03:08 PM    CL 95 2024 05:15 PM    CO2 28 2024 05:15 PM    BUN 51 2024 05:15 PM    CREATININE 3.0 2024  05:15 PM    GFRAA >60 09/15/2022 03:08 PM    LABGLOM 23 08/07/2024 05:15 PM    LABGLOM 31 03/05/2024 05:57 AM    GLUCOSE 120 08/07/2024 05:15 PM    CALCIUM 9.5 08/07/2024 05:15 PM    BILITOT 1.1 08/03/2024 03:27 AM    ALKPHOS 142 08/03/2024 03:27 AM    AST 16 08/03/2024 03:27 AM    ALT 9 08/03/2024 03:27 AM     Magnesium:    Lab Results   Component Value Date/Time    MG 1.9 08/06/2024 05:19 AM     Phosphorus:    Lab Results   Component Value Date/Time    PHOS 2.6 08/03/2024 03:27 AM     Radiology Review:        XR CHEST PORTABLE 8/2/24    IMPRESSION:  Cardiomegaly with underlying chronic interstitial changes seen in the lung  fields. No new focal parenchymal opacification present.         BRIEF SUMMARY OF INITIAL CONSULT:    Briefly Mr. Dorsey is a 59-year-old man with history of CKD stage IIIa, with large proteinuria, probably due to nephrosclerosis, baseline creatinine 1.5-1.7 mg/dL, HTN, CAD status post CABG, HFpEF 55%, valvular heart disease status post AVR and MVR both mechanical valves, post pacemaker placement, hyperlipidemia, CVA, testicular cancer status postchemotherapy, alcohol abuse, fatty liver, who was admitted on 8/2/2024 for shortness of breath and bilateral lower extremity edema, his proBNP was 1489, chest x-ray showed cardiomegaly with underlying chronic interstitial changes. Creatinine on admission 1.8 mg/dL has increased to 2.4 mg/dL, reason for this consultation. Home medications, empagliflozin, metoprolol, cholecalciferol, atorvastatin.     IMPRESSION/RECOMMENDATIONS:       FRANTZ on CKD, most likely hemodynamically mediated 2/2 acute decompensated heart failure, cardiorenal syndrome, baseline creatinine 1.5-1.8 mg/dL.  Creatinine increased to 3.1 mg/dL, discontinue Bumex gtt, obtain PVR     CKD stage IIIa, probably due to nephrosclerosis, baseline creatinine 1.5-1.8 mg/dL    Hypokalemia 2/2 diuretics, to replace, BMP at 4 pm  HFpEF 40-45%, proBNP 1489, on metoprolol, hydralazine, Bumex   HTN,

## 2024-08-08 NOTE — PROGRESS NOTES
Patient received a total of 5mL of IV potassium and refused to allow it to continue d/t burning and pain at site. Attempted to decrease rate of potassium to 50ML/hr and patient insisted it be stopped. Dr. Joiner notified.

## 2024-08-08 NOTE — PROGRESS NOTES
8/8/2024 1:16 PM  Len Dorsey  [id number]    Subjective:    Marks was removed yesterday   PVR today was 460 and marks was reinserted    Review of Systems  Constitutional: No fever or chills   Respiratory: negative for cough and hemoptysis  Cardiovascular: negative for chest pain and dyspnea  Gastrointestinal: negative for abdominal pain, diarrhea, nausea and vomiting   : See above  Derm: negative for rash and skin lesion(s)  Neurological: negative for seizures and tremors  Musculoskeletal: Negative    Psychiatric: Negative   All other reviews are negative      Scheduled Meds:   [START ON 8/9/2024] bumetanide  2 mg Oral BID    ceFAZolin  2,000 mg IntraVENous Q8H    tamsulosin  0.4 mg Oral Nightly    [Held by provider] metOLazone  5 mg Oral Daily    warfarin placeholder: dosing by pharmacy   Other RX Placeholder    methocarbamol  750 mg Oral 4x Daily    aspirin  81 mg Oral Daily    atorvastatin  20 mg Oral Nightly    metoprolol succinate  25 mg Oral BID    Vitamin D  1,000 Units Oral Daily    pantoprazole  40 mg Oral BID AC    lactulose  20 g Oral Daily    busPIRone  5 mg Oral TID    folic acid  1 mg Oral Daily    [Held by provider] hydrALAZINE  10 mg Oral 3 times per day    isosorbide dinitrate  10 mg Oral TID    midodrine  5 mg Oral TID WC    mirtazapine  7.5 mg Oral Nightly    nicotine  1 patch TransDERmal Daily    thiamine  100 mg Oral Daily    rifAXIMin  550 mg Oral BID    sodium chloride flush  5-40 mL IntraVENous 2 times per day       Objective:  Vitals:    08/08/24 1046   BP: 97/66   Pulse: 94   Resp: 18   Temp: 97.5 °F (36.4 °C)   SpO2:          Allergies: Patient has no known allergies.    General Appearance: alert and oriented to person, place and time and in no acute distress  Skin: no rash or erythema  Head: normocephalic and atraumatic  Pulmonary/Chest: normal air movement, no respiratory distress  Abdomen: soft, non-tender, non-distended  Genitourinary: marks with yellow urine   Extremities:

## 2024-08-08 NOTE — PROGRESS NOTES
Dr. Preston notified of 19 beats of VTach. Orders received for labs, phlebotomy notified. Message sent to cardio and nephrology to update per Dr. Preston.

## 2024-08-08 NOTE — PROGRESS NOTES
[facility]  Hematology/Oncology  Consult      Patient Name: Len Dorsey  YOB: 1965  PCP: Jama Skinner PA   Referring Provider:      Reason for Consultation:   Chief Complaint   Patient presents with    Shortness of Breath     shortness of breath, patient feels like he is overloaded with fluids again he states        History of Present Illness:  59-year-old man with past medical history relevant for coronary artery disease status post CABG, valvular heart disease status post mitral and aortic valve replacement x 2.  He has history of CHF, alcoholic liver disease, pacemaker placement and remote history of testicular cancer status post left orchiectomy in 1988 without evidence of disease recurrence.  He is hospitalized through ED with progressively worsening dyspnea with edema, swelling and weight gain.  He is very grumpy and angry and most of the history obtained from his EMR.  CMP on admission showed a serum creatinine of 1.6 and today it is 1.8.  Ammonia was slightly elevated at 70.  Hepatic panel with normal albumin with slightly elevated alk phos with normal transaminases and normal total bilirubin of 0.7.  proBNP was elevated at 1489 and high-sensitivity troponin was also elevated.  CBC on admission with hemoglobin of 6.9 with low MCV of 82 with normal WBC count and normal platelet count with WBC differential relevant for moderate lymphopenia.  B12 and folate levels were unremarkable and iron studies are consistent with iron deficiency anemia with low serum iron low ferritin and elevated TIBC and very low saturation.  Patient has been on anticoagulation with warfarin.  He is also on aspirin  Denies any melena.  INR on admission is supratherapeutic at 5.0 with a prothrombin time of 53 seconds.  He feels better following transfusion with 1 unit of packed RBC and his hemoglobin is up to 8.1  He has been initiated on folic acid as well as parenteral IV iron with  PERRLA, EOMI . Sclera non icteric.  Oropharynx : Clear  Neck: no JVD,  no adenopathy,   Heart: Regular rate and regular rhythm  Lungs: Diminished breath sounds at bases  Extremities: 2+ edema  Abdomen: Soft, non-tender;no masses, no organomegaly  Neurologic:Cranial nerves grossly intact. No focal motor or sensory deficits .    Recent Laboratory Data-   Lab Results   Component Value Date    WBC 11.2 08/08/2024    HGB 10.3 (L) 08/08/2024    HCT 36.0 (L) 08/08/2024    MCV 81.8 08/08/2024     08/05/2024    LYMPHOPCT 8 (L) 08/08/2024    RBC 4.40 08/08/2024    MCH 23.4 (L) 08/08/2024    MCHC 28.6 (L) 08/08/2024    RDW 22.0 (H) 08/08/2024    NEUTOPHILPCT 74 08/08/2024    MONOPCT 13 (H) 08/08/2024    EOSPCT 4 08/08/2024    BASOPCT 1 08/08/2024    NEUTROABS 8.33 (H) 08/08/2024    LYMPHSABS 0.90 (L) 08/08/2024    MONOSABS 1.43 (H) 08/08/2024    EOSABS 0.42 08/08/2024    BASOSABS 0.07 08/08/2024       Lab Results   Component Value Date     08/08/2024    K 2.7 (LL) 08/08/2024    CL 94 (L) 08/08/2024    CO2 25 08/08/2024    BUN 57 (H) 08/08/2024    CREATININE 3.1 (H) 08/08/2024    GLUCOSE 116 (H) 08/08/2024    CALCIUM 9.4 08/08/2024    BILITOT 1.1 08/03/2024    ALKPHOS 142 (H) 08/03/2024    AST 16 08/03/2024    ALT 9 08/03/2024    LABGLOM 23 (L) 08/08/2024    GFRAA >60 09/15/2022       Lab Results   Component Value Date    IRON 20 (L) 08/02/2024    TIBC 392 08/02/2024    FERRITIN 18 08/02/2024           Radiology-    XR CHEST PORTABLE    Result Date: 8/2/2024  EXAMINATION: ONE XRAY VIEW OF THE CHEST 8/2/2024 1:01 pm COMPARISON: 05/14/2024 HISTORY: ORDERING SYSTEM PROVIDED HISTORY: shortness of breath TECHNOLOGIST PROVIDED HISTORY: Reason for exam:->shortness of breath FINDINGS: Portable chest reveals heart to be enlarged.  Patient is status post median sternotomy.  Underlying chronic interstitial changes seen in the lung fields. Lung volumes remain low.  No new focal parenchymal opacification present. Pacer leads in  numbers of schistocytes, or spherocytes.      Thank you for the consult      Analia MCINTOSH-CNP  Electronically signed 8/8/2024 at 12:25 PM  Agree with above  Puma Pyle MD

## 2024-08-09 LAB
ANION GAP SERPL CALCULATED.3IONS-SCNC: 22 MMOL/L (ref 7–16)
BASOPHILS # BLD: 0.06 K/UL (ref 0–0.2)
BASOPHILS NFR BLD: 1 % (ref 0–2)
BUN SERPL-MCNC: 63 MG/DL (ref 6–20)
CALCIUM SERPL-MCNC: 9.1 MG/DL (ref 8.6–10.2)
CHLORIDE SERPL-SCNC: 94 MMOL/L (ref 98–107)
CO2 SERPL-SCNC: 22 MMOL/L (ref 22–29)
CREAT SERPL-MCNC: 3.7 MG/DL (ref 0.7–1.2)
EOSINOPHIL # BLD: 0.48 K/UL (ref 0.05–0.5)
EOSINOPHILS RELATIVE PERCENT: 4 % (ref 0–6)
ERYTHROCYTE [DISTWIDTH] IN BLOOD BY AUTOMATED COUNT: 22.5 % (ref 11.5–15)
GFR, ESTIMATED: 18 ML/MIN/1.73M2
GLUCOSE SERPL-MCNC: 101 MG/DL (ref 74–99)
HCT VFR BLD AUTO: 34.6 % (ref 37–54)
HGB BLD-MCNC: 10 G/DL (ref 12.5–16.5)
IMM GRANULOCYTES # BLD AUTO: 0.05 K/UL (ref 0–0.58)
IMM GRANULOCYTES NFR BLD: 1 % (ref 0–5)
INR PPP: 6
LYMPHOCYTES NFR BLD: 1.06 K/UL (ref 1.5–4)
LYMPHOCYTES RELATIVE PERCENT: 10 % (ref 20–42)
MCH RBC QN AUTO: 23.8 PG (ref 26–35)
MCHC RBC AUTO-ENTMCNC: 28.9 G/DL (ref 32–34.5)
MCV RBC AUTO: 82.4 FL (ref 80–99.9)
MONOCYTES NFR BLD: 1.43 K/UL (ref 0.1–0.95)
MONOCYTES NFR BLD: 13 % (ref 2–12)
NEUTROPHILS NFR BLD: 72 % (ref 43–80)
NEUTS SEG NFR BLD: 7.78 K/UL (ref 1.8–7.3)
PLATELET, FLUORESCENCE: 181 K/UL (ref 130–450)
PMV BLD AUTO: 11.7 FL (ref 7–12)
POTASSIUM SERPL-SCNC: 3.4 MMOL/L (ref 3.5–5)
PROTHROMBIN TIME: 64.4 SEC (ref 9.3–12.4)
RBC # BLD AUTO: 4.2 M/UL (ref 3.8–5.8)
RBC # BLD: ABNORMAL 10*6/UL
SODIUM SERPL-SCNC: 138 MMOL/L (ref 132–146)
WBC OTHER # BLD: 10.9 K/UL (ref 4.5–11.5)

## 2024-08-09 PROCEDURE — 99232 SBSQ HOSP IP/OBS MODERATE 35: CPT | Performed by: INTERNAL MEDICINE

## 2024-08-09 PROCEDURE — 6370000000 HC RX 637 (ALT 250 FOR IP): Performed by: INTERNAL MEDICINE

## 2024-08-09 PROCEDURE — 85610 PROTHROMBIN TIME: CPT

## 2024-08-09 PROCEDURE — 6370000000 HC RX 637 (ALT 250 FOR IP): Performed by: NURSE PRACTITIONER

## 2024-08-09 PROCEDURE — 36415 COLL VENOUS BLD VENIPUNCTURE: CPT

## 2024-08-09 PROCEDURE — 2580000003 HC RX 258: Performed by: INTERNAL MEDICINE

## 2024-08-09 PROCEDURE — 2060000000 HC ICU INTERMEDIATE R&B

## 2024-08-09 PROCEDURE — 99233 SBSQ HOSP IP/OBS HIGH 50: CPT | Performed by: INTERNAL MEDICINE

## 2024-08-09 PROCEDURE — 80048 BASIC METABOLIC PNL TOTAL CA: CPT

## 2024-08-09 PROCEDURE — 2580000003 HC RX 258: Performed by: STUDENT IN AN ORGANIZED HEALTH CARE EDUCATION/TRAINING PROGRAM

## 2024-08-09 PROCEDURE — 6370000000 HC RX 637 (ALT 250 FOR IP)

## 2024-08-09 PROCEDURE — 6360000002 HC RX W HCPCS: Performed by: STUDENT IN AN ORGANIZED HEALTH CARE EDUCATION/TRAINING PROGRAM

## 2024-08-09 PROCEDURE — 85025 COMPLETE CBC W/AUTO DIFF WBC: CPT

## 2024-08-09 RX ORDER — POTASSIUM CHLORIDE 1500 MG/1
20 TABLET, EXTENDED RELEASE ORAL ONCE
Status: COMPLETED | OUTPATIENT
Start: 2024-08-09 | End: 2024-08-09

## 2024-08-09 RX ADMIN — WATER 2000 MG: 1 INJECTION INTRAMUSCULAR; INTRAVENOUS; SUBCUTANEOUS at 05:13

## 2024-08-09 RX ADMIN — Medication 1000 UNITS: at 07:52

## 2024-08-09 RX ADMIN — MIDODRINE HYDROCHLORIDE 5 MG: 5 TABLET ORAL at 11:36

## 2024-08-09 RX ADMIN — ISOSORBIDE DINITRATE 10 MG: 10 TABLET ORAL at 07:52

## 2024-08-09 RX ADMIN — BUSPIRONE HYDROCHLORIDE 5 MG: 5 TABLET ORAL at 13:38

## 2024-08-09 RX ADMIN — BUMETANIDE 2 MG: 1 TABLET ORAL at 07:53

## 2024-08-09 RX ADMIN — OXYCODONE HYDROCHLORIDE 10 MG: 5 TABLET ORAL at 18:54

## 2024-08-09 RX ADMIN — BUMETANIDE 2 MG: 1 TABLET ORAL at 18:11

## 2024-08-09 RX ADMIN — SODIUM CHLORIDE, PRESERVATIVE FREE 10 ML: 5 INJECTION INTRAVENOUS at 11:36

## 2024-08-09 RX ADMIN — RIFAXIMIN 550 MG: 550 TABLET ORAL at 19:55

## 2024-08-09 RX ADMIN — BUSPIRONE HYDROCHLORIDE 5 MG: 5 TABLET ORAL at 19:55

## 2024-08-09 RX ADMIN — TAMSULOSIN HYDROCHLORIDE 0.4 MG: 0.4 CAPSULE ORAL at 19:55

## 2024-08-09 RX ADMIN — METHOCARBAMOL TABLETS 750 MG: 750 TABLET, COATED ORAL at 13:38

## 2024-08-09 RX ADMIN — SODIUM CHLORIDE, PRESERVATIVE FREE 10 ML: 5 INJECTION INTRAVENOUS at 19:57

## 2024-08-09 RX ADMIN — BUSPIRONE HYDROCHLORIDE 5 MG: 5 TABLET ORAL at 07:53

## 2024-08-09 RX ADMIN — FOLIC ACID 1 MG: 1 TABLET ORAL at 07:52

## 2024-08-09 RX ADMIN — WATER 2000 MG: 1 INJECTION INTRAMUSCULAR; INTRAVENOUS; SUBCUTANEOUS at 19:55

## 2024-08-09 RX ADMIN — MIDODRINE HYDROCHLORIDE 5 MG: 5 TABLET ORAL at 07:53

## 2024-08-09 RX ADMIN — METOPROLOL SUCCINATE 25 MG: 25 TABLET, EXTENDED RELEASE ORAL at 19:55

## 2024-08-09 RX ADMIN — SODIUM CHLORIDE, PRESERVATIVE FREE 10 ML: 5 INJECTION INTRAVENOUS at 07:53

## 2024-08-09 RX ADMIN — METHOCARBAMOL TABLETS 750 MG: 750 TABLET, COATED ORAL at 19:55

## 2024-08-09 RX ADMIN — OXYCODONE HYDROCHLORIDE 10 MG: 5 TABLET ORAL at 07:53

## 2024-08-09 RX ADMIN — POTASSIUM CHLORIDE 20 MEQ: 1500 TABLET, EXTENDED RELEASE ORAL at 11:04

## 2024-08-09 RX ADMIN — PANTOPRAZOLE SODIUM 40 MG: 40 TABLET, DELAYED RELEASE ORAL at 18:11

## 2024-08-09 RX ADMIN — METHOCARBAMOL TABLETS 750 MG: 750 TABLET, COATED ORAL at 07:52

## 2024-08-09 RX ADMIN — Medication 100 MG: at 07:53

## 2024-08-09 RX ADMIN — ISOSORBIDE DINITRATE 10 MG: 10 TABLET ORAL at 19:55

## 2024-08-09 RX ADMIN — METHOCARBAMOL TABLETS 750 MG: 750 TABLET, COATED ORAL at 18:11

## 2024-08-09 RX ADMIN — RIFAXIMIN 550 MG: 550 TABLET ORAL at 07:52

## 2024-08-09 RX ADMIN — WATER 2000 MG: 1 INJECTION INTRAMUSCULAR; INTRAVENOUS; SUBCUTANEOUS at 11:36

## 2024-08-09 RX ADMIN — MIRTAZAPINE 7.5 MG: 15 TABLET, FILM COATED ORAL at 19:55

## 2024-08-09 RX ADMIN — MIDODRINE HYDROCHLORIDE 5 MG: 5 TABLET ORAL at 18:11

## 2024-08-09 RX ADMIN — ISOSORBIDE DINITRATE 10 MG: 10 TABLET ORAL at 13:38

## 2024-08-09 RX ADMIN — PANTOPRAZOLE SODIUM 40 MG: 40 TABLET, DELAYED RELEASE ORAL at 05:13

## 2024-08-09 RX ADMIN — ATORVASTATIN CALCIUM 20 MG: 20 TABLET, FILM COATED ORAL at 19:55

## 2024-08-09 RX ADMIN — ASPIRIN 81 MG CHEWABLE TABLET 81 MG: 81 TABLET CHEWABLE at 07:53

## 2024-08-09 RX ADMIN — METOPROLOL SUCCINATE 25 MG: 25 TABLET, EXTENDED RELEASE ORAL at 07:53

## 2024-08-09 ASSESSMENT — PAIN DESCRIPTION - FREQUENCY: FREQUENCY: CONTINUOUS

## 2024-08-09 ASSESSMENT — PAIN DESCRIPTION - ORIENTATION: ORIENTATION: LOWER;LEFT

## 2024-08-09 ASSESSMENT — PAIN - FUNCTIONAL ASSESSMENT: PAIN_FUNCTIONAL_ASSESSMENT: PREVENTS OR INTERFERES SOME ACTIVE ACTIVITIES AND ADLS

## 2024-08-09 ASSESSMENT — PAIN DESCRIPTION - LOCATION: LOCATION: BACK;BREAST

## 2024-08-09 ASSESSMENT — PAIN SCALES - GENERAL
PAINLEVEL_OUTOF10: 5
PAINLEVEL_OUTOF10: 8

## 2024-08-09 ASSESSMENT — PAIN DESCRIPTION - DESCRIPTORS: DESCRIPTORS: DISCOMFORT;SORE;TENDER

## 2024-08-09 ASSESSMENT — PAIN DESCRIPTION - PAIN TYPE: TYPE: CHRONIC PAIN

## 2024-08-09 ASSESSMENT — PAIN DESCRIPTION - ONSET: ONSET: ON-GOING

## 2024-08-09 NOTE — PROGRESS NOTES
[facility] Hospitalist   Progress Note    Admitting Date and Time: 8/2/2024 12:11 PM  Admit Dx: Anemia [D64.9]  Anemia, unspecified type [D64.9]    Subjective:    Patient was admitted with Anemia [D64.9]  Anemia, unspecified type [D64.9]. Patient   denies fever, chills, cp, sob, n/v.     bumetanide  2 mg Oral BID    ceFAZolin  2,000 mg IntraVENous Q8H    tamsulosin  0.4 mg Oral Nightly    [Held by provider] metOLazone  5 mg Oral Daily    warfarin placeholder: dosing by pharmacy   Other RX Placeholder    methocarbamol  750 mg Oral 4x Daily    aspirin  81 mg Oral Daily    atorvastatin  20 mg Oral Nightly    metoprolol succinate  25 mg Oral BID    Vitamin D  1,000 Units Oral Daily    pantoprazole  40 mg Oral BID AC    lactulose  20 g Oral Daily    busPIRone  5 mg Oral TID    folic acid  1 mg Oral Daily    [Held by provider] hydrALAZINE  10 mg Oral 3 times per day    isosorbide dinitrate  10 mg Oral TID    midodrine  5 mg Oral TID WC    mirtazapine  7.5 mg Oral Nightly    nicotine  1 patch TransDERmal Daily    thiamine  100 mg Oral Daily    rifAXIMin  550 mg Oral BID    sodium chloride flush  5-40 mL IntraVENous 2 times per day     oxyCODONE, 5 mg, Q6H PRN  oxyCODONE, 10 mg, Q6H PRN  sodium chloride, , PRN  sodium chloride flush, 5-40 mL, PRN  sodium chloride, , PRN  potassium chloride, 40 mEq, PRN   Or  potassium alternative oral replacement, 40 mEq, PRN   Or  potassium chloride, 10 mEq, PRN  magnesium sulfate, 2,000 mg, PRN  polyethylene glycol, 17 g, Daily PRN  prochlorperazine, 10 mg, Q8H PRN   Or  prochlorperazine, 10 mg, Q6H PRN         Objective:    BP (!) 89/61   Pulse 83   Temp 98.2 °F (36.8 °C) (Oral)   Resp 18   Ht 1.778 m (5' 10\")   Wt 103.9 kg (229 lb)   SpO2 95%   BMI 32.86 kg/m²   Skin: warm and dry, no rash or erythema  Pulmonary/Chest: clear to auscultation bilaterally- no wheezes, rales or rhonchi, normal air movement, no respiratory distress  Cardiovascular: rhythm reg at rate  of 84  Abdomen: soft, non-tender, non-distended, normal bowel sounds, no masses or organomegaly  Extremities: no cyanosis, no clubbing, and no edema      Recent Labs     08/08/24  0751 08/08/24  1550 08/09/24  0418    137 138   K 2.7* 3.0* 3.4*   CL 94* 94* 94*   CO2 25 24 22   BUN 57* 61* 63*   CREATININE 3.1* 3.5* 3.7*   GLUCOSE 116* 120* 101*   CALCIUM 9.4 9.0 9.1       Recent Labs     08/07/24  0552 08/08/24  0751 08/09/24  0418   WBC 9.5 11.2 10.9   RBC 4.31 4.40 4.20   HGB 10.1* 10.3* 10.0*   HCT 35.6* 36.0* 34.6*   MCV 82.6 81.8 82.4   MCH 23.4* 23.4* 23.8*   MCHC 28.4* 28.6* 28.9*   RDW 21.6* 22.0* 22.5*   MPV 11.7 Can not be calculated 11.7       CBC with Differential:    Lab Results   Component Value Date/Time    WBC 10.9 08/09/2024 04:18 AM    RBC 4.20 08/09/2024 04:18 AM    HGB 10.0 08/09/2024 04:18 AM    HCT 34.6 08/09/2024 04:18 AM     08/05/2024 07:30 AM    MCV 82.4 08/09/2024 04:18 AM    MCH 23.8 08/09/2024 04:18 AM    MCHC 28.9 08/09/2024 04:18 AM    RDW 22.5 08/09/2024 04:18 AM    NRBC 1 08/07/2024 05:52 AM    METASPCT DUPLICATE 07/18/2023 04:12 AM    LYMPHOPCT 10 08/09/2024 04:18 AM    PROMYELOPCT DUPLICATE 07/18/2023 04:12 AM    MONOPCT 13 08/09/2024 04:18 AM    MYELOPCT DUPLICATE 07/18/2023 04:12 AM    EOSPCT 4 08/09/2024 04:18 AM    BASOPCT 1 08/09/2024 04:18 AM    MONOSABS 1.43 08/09/2024 04:18 AM    LYMPHSABS 1.06 08/09/2024 04:18 AM    EOSABS 0.48 08/09/2024 04:18 AM    BASOSABS 0.06 08/09/2024 04:18 AM     BMP:    Lab Results   Component Value Date/Time     08/09/2024 04:18 AM    K 3.4 08/09/2024 04:18 AM    K 3.9 09/15/2022 03:08 PM    CL 94 08/09/2024 04:18 AM    CO2 22 08/09/2024 04:18 AM    BUN 63 08/09/2024 04:18 AM    CREATININE 3.7 08/09/2024 04:18 AM    CALCIUM 9.1 08/09/2024 04:18 AM    GFRAA >60 09/15/2022 03:08 PM    LABGLOM 18 08/09/2024 04:18 AM    LABGLOM 31 03/05/2024 05:57 AM    GLUCOSE 101 08/09/2024 04:18 AM     PT/INR:    Lab Results   Component Value

## 2024-08-09 NOTE — PROGRESS NOTES
[facility] Cardiology Inpatient Progress Note    Patient is a 59 y.o. male of Jama Siknner PA seen in hospital follow up.     Chief complaint: CHF    HPI: Some SOB. No CP.     Patient Active Problem List   Diagnosis    Chest pain    Closed fracture of nasal bones    Injury of face    Retroperitoneal hematoma    Vitamin D deficiency    Acute chest pain    Acute cholecystitis    Gastrointestinal hemorrhage    Polyp of colon    Right upper quadrant abdominal pain    Diarrhea    Current moderate episode of major depressive disorder without prior episode (HCC)    Hypertension    H/O mitral valve replacement with mechanical valve    H/O mechanical aortic valve replacement    Coronary artery disease involving native coronary artery of native heart without angina pectoris    Paravalvular leak (prosthetic valve)    Hx of CABG    Warfarin anticoagulation    Supratherapeutic INR    Moderate obesity    Dyspnea    Alcohol withdrawal syndrome without complication (HCC)    DTs (delirium tremens) (HCC)    Anemia    Severe protein-calorie malnutrition (HCC)    Acute decompensated heart failure (HCC)    Aortic valve disease    Mitral valve disease    AV block    Pure hypercholesterolemia    Chronic obstructive pulmonary disease (HCC)    Loculated pleural effusion    Troponin level elevated    Stage 2 chronic kidney disease    Alcohol use    Breast swelling    Pleural effusion    Hypophosphatemia    Acute congestive heart failure (HCC)    FRANTZ (acute kidney injury) (HCC)    Acidosis    Hyperglycemia    Hyperlipidemia    Alcoholic liver disease, unspecified (HCC)    Thrombocytopenia (HCC)    Delirium    Acute blood loss anemia    Heart failure with mid-range ejection fraction (HFmEF) (HCC)    Acute on chronic combined systolic and diastolic congestive heart failure (HCC)    Normally functioning cardiac pacemaker present    Hepatic cirrhosis (HCC)    Acute on chronic systolic (congestive) heart failure (HCC)       No Known  CAD:    LIMA to LAD original AVR/MVR surgery 2020.    4. Pacer: [employer].     5. NSVT: Monitor.     BB.     6. Acute on CKD: Follow labs. Renal managing.     6. Anemia: Follow labs.     7. HTN: Observe.     8. Lipids: Statin.     9. Left-sided tender gynecomastia etiology unclear: Per primary service.     10. COPD: Per primary service.     11. Tobacco abuse    12. Alcoholic liver disease    13. Obesity     14. Noncompliance/social/financial/transportation issues    Available external charts reviewed.   Available imaging and evaluations independently reviewed.   Interviewed and discussed patient with available family.  Discussed case with referring service and non-cardiology consultants.     Greater than 50 minutes was spent counseling the patient, reviewing the rationale for the above recommendations and reviewing the patient's current medication list, problem list and results of all previously ordered testing.    Melba Mcmanus D.O.  Cardiologist  Cardiology, [facility]

## 2024-08-09 NOTE — PLAN OF CARE
Problem: ABCDS Injury Assessment  Goal: Absence of physical injury  8/8/2024 2037 by Elana Squires, RN  Outcome: Progressing  8/8/2024 1046 by Elana Squires, RN  Outcome: Progressing     Problem: Discharge Planning  Goal: Discharge to home or other facility with appropriate resources  8/8/2024 2037 by Elana Squires, RN  Outcome: Progressing  Flowsheets (Taken 8/8/2024 2035)  Discharge to home or other facility with appropriate resources:   Identify barriers to discharge with patient and caregiver   Arrange for needed discharge resources and transportation as appropriate   Identify discharge learning needs (meds, wound care, etc)   Arrange for interpreters to assist at discharge as needed   Refer to discharge planning if patient needs post-hospital services based on physician order or complex needs related to functional status, cognitive ability or social support system  8/8/2024 1046 by Elana Squires, RN  Outcome: Progressing  Flowsheets (Taken 8/8/2024 0800)  Discharge to home or other facility with appropriate resources:   Identify barriers to discharge with patient and caregiver   Arrange for needed discharge resources and transportation as appropriate   Identify discharge learning needs (meds, wound care, etc)   Arrange for interpreters to assist at discharge as needed   Refer to discharge planning if patient needs post-hospital services based on physician order or complex needs related to functional status, cognitive ability or social support system     Problem: Safety - Adult  Goal: Free from fall injury  8/8/2024 2037 by Elana Squires, RN  Outcome: Progressing  8/8/2024 1046 by Elana Squires, RN  Outcome: Progressing     Problem: Pain  Goal: Verbalizes/displays adequate comfort level or baseline comfort level  8/8/2024 2037 by Elana Squires, RN  Outcome: Progressing  Flowsheets  Taken 8/8/2024 1900  Verbalizes/displays adequate comfort level or baseline comfort level:   Encourage patient to monitor pain and  Elana ARTEAGA RN  Outcome: Progressing  8/8/2024 1046 by Elana Squires RN  Outcome: Progressing     Problem: Chronic Conditions and Co-morbidities  Goal: Patient's chronic conditions and co-morbidity symptoms are monitored and maintained or improved  8/8/2024 2037 by Elana Squires, RN  Outcome: Progressing  Flowsheets (Taken 8/8/2024 2035)  Care Plan - Patient's Chronic Conditions and Co-Morbidity Symptoms are Monitored and Maintained or Improved:   Monitor and assess patient's chronic conditions and comorbid symptoms for stability, deterioration, or improvement   Collaborate with multidisciplinary team to address chronic and comorbid conditions and prevent exacerbation or deterioration   Update acute care plan with appropriate goals if chronic or comorbid symptoms are exacerbated and prevent overall improvement and discharge  8/8/2024 1046 by Elana Squires RN  Outcome: Progressing  Flowsheets (Taken 8/8/2024 0800)  Care Plan - Patient's Chronic Conditions and Co-Morbidity Symptoms are Monitored and Maintained or Improved:   Monitor and assess patient's chronic conditions and comorbid symptoms for stability, deterioration, or improvement   Collaborate with multidisciplinary team to address chronic and comorbid conditions and prevent exacerbation or deterioration   Update acute care plan with appropriate goals if chronic or comorbid symptoms are exacerbated and prevent overall improvement and discharge

## 2024-08-09 NOTE — PLAN OF CARE
Problem: ABCDS Injury Assessment  Goal: Absence of physical injury  8/9/2024 0812 by Elana Squires, RN  Outcome: Progressing  8/8/2024 2037 by Elana Squires RN  Outcome: Progressing     Problem: Discharge Planning  Goal: Discharge to home or other facility with appropriate resources  8/9/2024 0812 by Elana Squires, RN  Outcome: Progressing  Flowsheets (Taken 8/9/2024 0806)  Discharge to home or other facility with appropriate resources:   Identify barriers to discharge with patient and caregiver   Arrange for needed discharge resources and transportation as appropriate   Identify discharge learning needs (meds, wound care, etc)   Arrange for interpreters to assist at discharge as needed   Refer to discharge planning if patient needs post-hospital services based on physician order or complex needs related to functional status, cognitive ability or social support system  8/8/2024 2037 by Elana Squires, RN  Outcome: Progressing  Flowsheets (Taken 8/8/2024 2035)  Discharge to home or other facility with appropriate resources:   Identify barriers to discharge with patient and caregiver   Arrange for needed discharge resources and transportation as appropriate   Identify discharge learning needs (meds, wound care, etc)   Arrange for interpreters to assist at discharge as needed   Refer to discharge planning if patient needs post-hospital services based on physician order or complex needs related to functional status, cognitive ability or social support system     Problem: Safety - Adult  Goal: Free from fall injury  8/9/2024 0812 by Elana Squires, RN  Outcome: Progressing  8/8/2024 2037 by Elana Squires, RN  Outcome: Progressing     Problem: Pain  Goal: Verbalizes/displays adequate comfort level or baseline comfort level  8/9/2024 0812 by Elana Squires, RN  Outcome: Progressing  Flowsheets (Taken 8/9/2024 0746)  Verbalizes/displays adequate comfort level or baseline comfort level:   Encourage patient to monitor pain and  request assistance   Assess pain using appropriate pain scale   Administer analgesics based on type and severity of pain and evaluate response   Implement non-pharmacological measures as appropriate and evaluate response   Consider cultural and social influences on pain and pain management   Notify Licensed Independent Practitioner if interventions unsuccessful or patient reports new pain  8/8/2024 2037 by Elana Squires, RN  Outcome: Progressing  Flowsheets  Taken 8/8/2024 1900  Verbalizes/displays adequate comfort level or baseline comfort level:   Encourage patient to monitor pain and request assistance   Assess pain using appropriate pain scale   Administer analgesics based on type and severity of pain and evaluate response   Implement non-pharmacological measures as appropriate and evaluate response   Consider cultural and social influences on pain and pain management   Notify Licensed Independent Practitioner if interventions unsuccessful or patient reports new pain  Taken 8/8/2024 1450  Verbalizes/displays adequate comfort level or baseline comfort level:   Encourage patient to monitor pain and request assistance   Assess pain using appropriate pain scale   Administer analgesics based on type and severity of pain and evaluate response   Implement non-pharmacological measures as appropriate and evaluate response   Consider cultural and social influences on pain and pain management   Notify Licensed Independent Practitioner if interventions unsuccessful or patient reports new pain     Problem: Skin/Tissue Integrity  Goal: Absence of new skin breakdown  Description: 1.  Monitor for areas of redness and/or skin breakdown  2.  Assess vascular access sites hourly  3.  Every 4-6 hours minimum:  Change oxygen saturation probe site  4.  Every 4-6 hours:  If on nasal continuous positive airway pressure, respiratory therapy assess nares and determine need for appliance change or resting period.  8/9/2024 0812 by Shaw

## 2024-08-09 NOTE — PROGRESS NOTES
Infectious Disease  Progress Note  [facility]    Chief Complaint: right leg redness.     Subjective:  he is ok but frustrated that \" no one is telling me anything\". I have explained that I am treating cellulitis in the right leg. Has ace wrap on.     Scheduled Meds:   bumetanide  2 mg Oral BID    ceFAZolin  2,000 mg IntraVENous Q8H    tamsulosin  0.4 mg Oral Nightly    [Held by provider] metOLazone  5 mg Oral Daily    warfarin placeholder: dosing by pharmacy   Other RX Placeholder    methocarbamol  750 mg Oral 4x Daily    aspirin  81 mg Oral Daily    atorvastatin  20 mg Oral Nightly    metoprolol succinate  25 mg Oral BID    Vitamin D  1,000 Units Oral Daily    pantoprazole  40 mg Oral BID AC    lactulose  20 g Oral Daily    busPIRone  5 mg Oral TID    folic acid  1 mg Oral Daily    [Held by provider] hydrALAZINE  10 mg Oral 3 times per day    isosorbide dinitrate  10 mg Oral TID    midodrine  5 mg Oral TID WC    mirtazapine  7.5 mg Oral Nightly    nicotine  1 patch TransDERmal Daily    thiamine  100 mg Oral Daily    rifAXIMin  550 mg Oral BID    sodium chloride flush  5-40 mL IntraVENous 2 times per day     Continuous Infusions:   sodium chloride      sodium chloride       PRN Meds:oxyCODONE, oxyCODONE, sodium chloride, sodium chloride flush, sodium chloride, potassium chloride **OR** potassium alternative oral replacement **OR** potassium chloride, magnesium sulfate, polyethylene glycol, prochlorperazine **OR** prochlorperazine    Prior to Admission medications    Medication Sig Start Date End Date Taking? Authorizing Provider   warfarin (COUMADIN) 3 MG tablet Take 1 tablet by mouth daily  Patient taking differently: Take 4 mg by mouth daily 5/19/24   Uzma Burgos MD   busPIRone (BUSPAR) 5 MG tablet Take 1 tablet by mouth 3 times daily 5/19/24   Uzma Burgos MD   folic acid (FOLVITE) 1 MG tablet Take 1 tablet by mouth daily 5/20/24   Uzma Burgos MD   hydrALAZINE (APRESOLINE) 10 MG tablet Take 1  ascultation. Respirations even and nonlabored.  Abdomen- Soft, bowel sounds positive, non tender  Musculo/Extremities-  right leg redness is slightly better. Swelling is the same.   Neurological- No focal motor or sensory loss.  No confusion  Skin:  Warm and dry, free from rashes.  Left sided chest swelling.  Lines: PIV    Labs, Cultures reviewed  Radiology and other consultants notes reviewed    Microbiology:  Blood cultures 8/7 in process    Assessment:  Right leg cellulitis:  Left breast swelling: needs to be worked up outpatient.     Plan:    Continue IV cefazolin 2 g every 8  Ultrasound left breast-was informed that this cannot be done at [facility].  Will need to be followed up outpatient with primary care physician.  Ace wrap and elevate right foot when in bed.   If the redness is better, and planned for d/c on the weekend, can do Keflex 1gr TID for 5 more days.   Monitor labs  Will follow with you      Electronically signed by DAT ARANA MD on 8/9/2024 at 12:58 PM

## 2024-08-09 NOTE — CARE COORDINATION
Social work / Discharge planning:          Discharge plan is home when medically stable.    On IV Ancef.    Room air.    Electronically signed by ANJEL Hernandez on 8/9/2024 at 3:02 PM

## 2024-08-09 NOTE — PROGRESS NOTES
Notified night MD of patients INR level, order to hold coumadin today and recheck INR level daily.

## 2024-08-09 NOTE — PROGRESS NOTES
Department of Internal Medicine  Nephrology Progress Note    Events reviewed.  Had episode of VT    SUBJECTIVE:   We are following for FRANTZ and fluid overload. Patient reports no complaints.     PHYSICAL EXAM:      Vitals:    VITALS:  /68   Pulse 72   Temp 97.9 °F (36.6 °C) (Oral)   Resp 18   Ht 1.778 m (5' 10\")   Wt 103.9 kg (229 lb)   SpO2 95%   BMI 32.86 kg/m²   24HR BLOOD PRESSURE RANGE:  Systolic (24hrs), Av , Min:91 , Max:136   ; Diastolic (24hrs), Av, Min:59, Max:88    24HR INTAKE/OUTPUT:    Intake/Output Summary (Last 24 hours) at 2024 0915  Last data filed at 2024 0757  Gross per 24 hour   Intake 720 ml   Output 2711 ml   Net -1991 ml         Constitutional:  awake NAD  HEENT:  EOMI  Respiratory:  CTA  Cardiovascular/Edema:  S1 S2  Gastrointestinal:  +BS  Neurologic:  no deficits  Skin:  warm dry  Other:  BLE ++edema     DATA:    CBC with Differential:    Lab Results   Component Value Date/Time    WBC 10.9 2024 04:18 AM    RBC 4.20 2024 04:18 AM    HGB 10.0 2024 04:18 AM    HCT 34.6 2024 04:18 AM     2024 07:30 AM    MCV 82.4 2024 04:18 AM    MCH 23.8 2024 04:18 AM    MCHC 28.9 2024 04:18 AM    RDW 22.5 2024 04:18 AM    NRBC 1 2024 05:52 AM    METASPCT DUPLICATE 2023 04:12 AM    LYMPHOPCT 10 2024 04:18 AM    PROMYELOPCT DUPLICATE 2023 04:12 AM    MONOPCT 13 2024 04:18 AM    MYELOPCT DUPLICATE 2023 04:12 AM    EOSPCT 4 2024 04:18 AM    BASOPCT 1 2024 04:18 AM    MONOSABS 1.43 2024 04:18 AM    LYMPHSABS 1.06 2024 04:18 AM    EOSABS 0.48 2024 04:18 AM    BASOSABS 0.06 2024 04:18 AM     CMP:    Lab Results   Component Value Date/Time     2024 04:18 AM    K 3.4 2024 04:18 AM    K 3.9 09/15/2022 03:08 PM    CL 94 2024 04:18 AM    CO2 22 2024 04:18 AM    BUN 63 2024 04:18 AM    CREATININE 3.7 2024 04:18 AM     GFRAA >60 09/15/2022 03:08 PM    LABGLOM 18 08/09/2024 04:18 AM    LABGLOM 31 03/05/2024 05:57 AM    GLUCOSE 101 08/09/2024 04:18 AM    CALCIUM 9.1 08/09/2024 04:18 AM    BILITOT 1.1 08/03/2024 03:27 AM    ALKPHOS 142 08/03/2024 03:27 AM    AST 16 08/03/2024 03:27 AM    ALT 9 08/03/2024 03:27 AM     Magnesium:    Lab Results   Component Value Date/Time    MG 1.6 08/08/2024 03:50 PM     Phosphorus:    Lab Results   Component Value Date/Time    PHOS 2.6 08/03/2024 03:27 AM     Radiology Review:        XR CHEST PORTABLE 8/2/24    IMPRESSION:  Cardiomegaly with underlying chronic interstitial changes seen in the lung  fields. No new focal parenchymal opacification present.         BRIEF SUMMARY OF INITIAL CONSULT:    Briefly Mr. Dorsey is a 59-year-old man with history of CKD stage IIIa, with large proteinuria, probably due to nephrosclerosis, baseline creatinine 1.5-1.7 mg/dL, HTN, CAD status post CABG, HFpEF 55%, valvular heart disease status post AVR and MVR both mechanical valves, post pacemaker placement, hyperlipidemia, CVA, testicular cancer status postchemotherapy, alcohol abuse, fatty liver, who was admitted on 8/2/2024 for shortness of breath and bilateral lower extremity edema, his proBNP was 1489, chest x-ray showed cardiomegaly with underlying chronic interstitial changes. Creatinine on admission 1.8 mg/dL has increased to 2.4 mg/dL, reason for this consultation. Home medications, empagliflozin, metoprolol, cholecalciferol, atorvastatin.     IMPRESSION/RECOMMENDATIONS:       FRANTZ on CKD, most likely hemodynamically mediated 2/2 acute decompensated heart failure, cardiorenal syndrome, baseline creatinine 1.5-1.8 mg/dL.  Creatinine increased to 3.7 mg/dL, continue Culver catheter per urology     CKD stage IIIa, probably due to nephrosclerosis, baseline creatinine 1.5-1.8 mg/dL    Hypokalemia 2/2 diuretics, to replace  HFpEF 40-45%, proBNP 1489, on metoprolol, hydralazine, Bumex   HTN, presently hypotensive,

## 2024-08-09 NOTE — PROGRESS NOTES
Pharmacy Consultation Note  (Warfarin Dosing and Monitoring)    Initial consult date: 8/2  Consulting Provider: Latanya Dorsey is a 59 y.o. male for whom pharmacy has been asked to manage warfarin therapy.     Weight:   Wt Readings from Last 1 Encounters:   08/08/24 103.9 kg (229 lb)       TSH:    Lab Results   Component Value Date/Time    TSH 0.95 05/08/2024 02:24 AM       Hepatic Function Panel:                            Lab Results   Component Value Date/Time    ALKPHOS 142 08/03/2024 03:27 AM    ALT 9 08/03/2024 03:27 AM    AST 16 08/03/2024 03:27 AM    BILITOT 1.1 08/03/2024 03:27 AM    BILIDIR 0.2 07/14/2023 05:43 PM    IBILI 0.2 07/14/2023 05:43 PM       Current significant warfarin drug-drug interactions include: No significant interactions    Recent Labs     08/07/24  0552 08/08/24  0751 08/09/24  0418   HGB 10.1* 10.3* 10.0*       Date Warfarin Dose INR Heparin or LMWH Comment   8/3 HOLD 5 Lovenox DC'd    8/4 HOLD 4 --    8/5 4 mg 3.1 --    8/6 6 mg 2.6 --    8/7 6 mg 2.6 --    8/8 2 mg 3.7 --    8/9 HOLD 6.0       Assessment:  Patient is a 59 y.o. male on warfarin for Atrial Fibrillation, Mechanical Heart Valve (mitral), and Mechanical Heart Valve (atrial).  Patient's home warfarin dosing regimen is 4mg daily per med rec but patient filled 3mg tabs most recently.   INR possibly elevated in setting of liver dysfunction.  Goal INR 2.5 - 3.5  INR 6.0 today    Plan:  HOLD warfarin tonight in setting of slightly supratherapeutic INR.   Daily PT/INR until the INR is stable within the therapeutic range.  Pharmacist will follow and monitor/adjust dosing as necessary.    Thank you for this consult,    Jamie Nolen, PharmD 8/9/2024 11:47 AM   Ext: [phone]

## 2024-08-09 NOTE — PROGRESS NOTES
..     Nutrition Note     Reason for Visit:   Length of Stay    Nutrition Assessment:  Pt admits w/ acute on chronic CHF, FRANTZ on CKD. Hx COPD, ETOH abuse, cirrhosis, CVA, CAD. He is consuming >75% of meals, continue current diet & monitor.     Current Nutrition Therapies:    ADULT DIET; Regular; Low Fat/Low Chol/High Fiber/2 gm Na; 2000 ml    Anthropometrics:   Current Height: 177.8 cm (5' 10\")  Current Weight - Scale: 103.9 kg (229 lb)      Monitoring and Evaluation:  No nutrition diagnosis. Patient will be monitored per nutrition standards of care.     Consult Dietitian if nutrition intervention essential to patient care is needed.     Discharge Planning:  No needs    Miranda D'Amico, RD, LD

## 2024-08-10 LAB
ANION GAP SERPL CALCULATED.3IONS-SCNC: 21 MMOL/L (ref 7–16)
ANION GAP SERPL CALCULATED.3IONS-SCNC: 23 MMOL/L (ref 7–16)
BASOPHILS # BLD: 0.06 K/UL (ref 0–0.2)
BASOPHILS NFR BLD: 1 % (ref 0–2)
BUN SERPL-MCNC: 74 MG/DL (ref 6–20)
BUN SERPL-MCNC: 84 MG/DL (ref 6–20)
CALCIUM SERPL-MCNC: 8.7 MG/DL (ref 8.6–10.2)
CALCIUM SERPL-MCNC: 8.9 MG/DL (ref 8.6–10.2)
CHLORIDE SERPL-SCNC: 93 MMOL/L (ref 98–107)
CHLORIDE SERPL-SCNC: 93 MMOL/L (ref 98–107)
CO2 SERPL-SCNC: 19 MMOL/L (ref 22–29)
CO2 SERPL-SCNC: 22 MMOL/L (ref 22–29)
CREAT SERPL-MCNC: 4.5 MG/DL (ref 0.7–1.2)
CREAT SERPL-MCNC: 5.2 MG/DL (ref 0.7–1.2)
EOSINOPHIL # BLD: 0.45 K/UL (ref 0.05–0.5)
EOSINOPHILS RELATIVE PERCENT: 5 % (ref 0–6)
ERYTHROCYTE [DISTWIDTH] IN BLOOD BY AUTOMATED COUNT: 22.7 % (ref 11.5–15)
GFR, ESTIMATED: 12 ML/MIN/1.73M2
GFR, ESTIMATED: 15 ML/MIN/1.73M2
GLUCOSE SERPL-MCNC: 105 MG/DL (ref 74–99)
GLUCOSE SERPL-MCNC: 111 MG/DL (ref 74–99)
HCT VFR BLD AUTO: 31.7 % (ref 37–54)
HGB BLD-MCNC: 9.1 G/DL (ref 12.5–16.5)
IMM GRANULOCYTES # BLD AUTO: 0.04 K/UL (ref 0–0.58)
IMM GRANULOCYTES NFR BLD: 0 % (ref 0–5)
INR PPP: 9.4
INR PPP: 9.5
LYMPHOCYTES NFR BLD: 0.89 K/UL (ref 1.5–4)
LYMPHOCYTES RELATIVE PERCENT: 9 % (ref 20–42)
MCH RBC QN AUTO: 23.7 PG (ref 26–35)
MCHC RBC AUTO-ENTMCNC: 28.7 G/DL (ref 32–34.5)
MCV RBC AUTO: 82.6 FL (ref 80–99.9)
MONOCYTES NFR BLD: 1.65 K/UL (ref 0.1–0.95)
MONOCYTES NFR BLD: 17 % (ref 2–12)
NEUTROPHILS NFR BLD: 67 % (ref 43–80)
NEUTS SEG NFR BLD: 6.38 K/UL (ref 1.8–7.3)
PLATELET, FLUORESCENCE: 156 K/UL (ref 130–450)
PMV BLD AUTO: 11.8 FL (ref 7–12)
POTASSIUM SERPL-SCNC: 3.1 MMOL/L (ref 3.5–5)
POTASSIUM SERPL-SCNC: 3.4 MMOL/L (ref 3.5–5)
PROTHROMBIN TIME: 102.2 SEC (ref 9.3–12.4)
PROTHROMBIN TIME: 103.3 SEC (ref 9.3–12.4)
RBC # BLD AUTO: 3.84 M/UL (ref 3.8–5.8)
RBC # BLD: ABNORMAL 10*6/UL
SODIUM SERPL-SCNC: 135 MMOL/L (ref 132–146)
SODIUM SERPL-SCNC: 136 MMOL/L (ref 132–146)
WBC OTHER # BLD: 9.5 K/UL (ref 4.5–11.5)

## 2024-08-10 PROCEDURE — 6360000002 HC RX W HCPCS

## 2024-08-10 PROCEDURE — 80048 BASIC METABOLIC PNL TOTAL CA: CPT

## 2024-08-10 PROCEDURE — P9047 ALBUMIN (HUMAN), 25%, 50ML: HCPCS

## 2024-08-10 PROCEDURE — 2580000003 HC RX 258: Performed by: CLINICAL NURSE SPECIALIST

## 2024-08-10 PROCEDURE — 2060000000 HC ICU INTERMEDIATE R&B

## 2024-08-10 PROCEDURE — 85025 COMPLETE CBC W/AUTO DIFF WBC: CPT

## 2024-08-10 PROCEDURE — 6370000000 HC RX 637 (ALT 250 FOR IP): Performed by: STUDENT IN AN ORGANIZED HEALTH CARE EDUCATION/TRAINING PROGRAM

## 2024-08-10 PROCEDURE — 6360000002 HC RX W HCPCS: Performed by: STUDENT IN AN ORGANIZED HEALTH CARE EDUCATION/TRAINING PROGRAM

## 2024-08-10 PROCEDURE — 2580000003 HC RX 258

## 2024-08-10 PROCEDURE — 6360000002 HC RX W HCPCS: Performed by: CLINICAL NURSE SPECIALIST

## 2024-08-10 PROCEDURE — 2580000003 HC RX 258: Performed by: INTERNAL MEDICINE

## 2024-08-10 PROCEDURE — 2580000003 HC RX 258: Performed by: STUDENT IN AN ORGANIZED HEALTH CARE EDUCATION/TRAINING PROGRAM

## 2024-08-10 PROCEDURE — 6370000000 HC RX 637 (ALT 250 FOR IP)

## 2024-08-10 PROCEDURE — 6370000000 HC RX 637 (ALT 250 FOR IP): Performed by: INTERNAL MEDICINE

## 2024-08-10 PROCEDURE — 85610 PROTHROMBIN TIME: CPT

## 2024-08-10 PROCEDURE — 6370000000 HC RX 637 (ALT 250 FOR IP): Performed by: NURSE PRACTITIONER

## 2024-08-10 PROCEDURE — 99233 SBSQ HOSP IP/OBS HIGH 50: CPT | Performed by: INTERNAL MEDICINE

## 2024-08-10 PROCEDURE — 99232 SBSQ HOSP IP/OBS MODERATE 35: CPT | Performed by: INTERNAL MEDICINE

## 2024-08-10 RX ORDER — PHYTONADIONE 5 MG/1
5 TABLET ORAL ONCE
Status: COMPLETED | OUTPATIENT
Start: 2024-08-10 | End: 2024-08-10

## 2024-08-10 RX ORDER — SODIUM CHLORIDE, SODIUM LACTATE, POTASSIUM CHLORIDE, CALCIUM CHLORIDE 600; 310; 30; 20 MG/100ML; MG/100ML; MG/100ML; MG/100ML
INJECTION, SOLUTION INTRAVENOUS CONTINUOUS
Status: DISCONTINUED | OUTPATIENT
Start: 2024-08-10 | End: 2024-08-13

## 2024-08-10 RX ORDER — POTASSIUM CHLORIDE 1500 MG/1
40 TABLET, EXTENDED RELEASE ORAL ONCE
Status: COMPLETED | OUTPATIENT
Start: 2024-08-10 | End: 2024-08-10

## 2024-08-10 RX ORDER — ALBUMIN (HUMAN) 12.5 G/50ML
25 SOLUTION INTRAVENOUS EVERY 8 HOURS
Status: COMPLETED | OUTPATIENT
Start: 2024-08-10 | End: 2024-08-10

## 2024-08-10 RX ADMIN — PANTOPRAZOLE SODIUM 40 MG: 40 TABLET, DELAYED RELEASE ORAL at 06:16

## 2024-08-10 RX ADMIN — PHYTONADIONE 5 MG: 5 TABLET ORAL at 06:16

## 2024-08-10 RX ADMIN — METHOCARBAMOL TABLETS 750 MG: 750 TABLET, COATED ORAL at 16:24

## 2024-08-10 RX ADMIN — METOPROLOL SUCCINATE 25 MG: 25 TABLET, EXTENDED RELEASE ORAL at 07:52

## 2024-08-10 RX ADMIN — PANTOPRAZOLE SODIUM 40 MG: 40 TABLET, DELAYED RELEASE ORAL at 15:11

## 2024-08-10 RX ADMIN — BUSPIRONE HYDROCHLORIDE 5 MG: 5 TABLET ORAL at 19:57

## 2024-08-10 RX ADMIN — ISOSORBIDE DINITRATE 10 MG: 10 TABLET ORAL at 15:11

## 2024-08-10 RX ADMIN — POTASSIUM CHLORIDE 40 MEQ: 1500 TABLET, EXTENDED RELEASE ORAL at 09:58

## 2024-08-10 RX ADMIN — ALBUMIN (HUMAN) 25 G: 0.25 INJECTION, SOLUTION INTRAVENOUS at 11:43

## 2024-08-10 RX ADMIN — FOLIC ACID 1 MG: 1 TABLET ORAL at 07:52

## 2024-08-10 RX ADMIN — TAMSULOSIN HYDROCHLORIDE 0.4 MG: 0.4 CAPSULE ORAL at 19:57

## 2024-08-10 RX ADMIN — ALBUMIN (HUMAN) 25 G: 0.25 INJECTION, SOLUTION INTRAVENOUS at 19:58

## 2024-08-10 RX ADMIN — SODIUM CHLORIDE, PRESERVATIVE FREE 10 ML: 5 INJECTION INTRAVENOUS at 19:57

## 2024-08-10 RX ADMIN — LACTULOSE 20 G: 10 SOLUTION ORAL at 07:52

## 2024-08-10 RX ADMIN — ATORVASTATIN CALCIUM 20 MG: 20 TABLET, FILM COATED ORAL at 19:56

## 2024-08-10 RX ADMIN — MIDODRINE HYDROCHLORIDE 5 MG: 5 TABLET ORAL at 11:40

## 2024-08-10 RX ADMIN — Medication 100 MG: at 07:52

## 2024-08-10 RX ADMIN — BUSPIRONE HYDROCHLORIDE 5 MG: 5 TABLET ORAL at 15:11

## 2024-08-10 RX ADMIN — MIDODRINE HYDROCHLORIDE 5 MG: 5 TABLET ORAL at 16:24

## 2024-08-10 RX ADMIN — SODIUM CHLORIDE, PRESERVATIVE FREE 10 ML: 5 INJECTION INTRAVENOUS at 07:53

## 2024-08-10 RX ADMIN — PHYTONADIONE 5 MG: 5 TABLET ORAL at 15:11

## 2024-08-10 RX ADMIN — ISOSORBIDE DINITRATE 10 MG: 10 TABLET ORAL at 07:52

## 2024-08-10 RX ADMIN — BUSPIRONE HYDROCHLORIDE 5 MG: 5 TABLET ORAL at 07:52

## 2024-08-10 RX ADMIN — METHOCARBAMOL TABLETS 750 MG: 750 TABLET, COATED ORAL at 07:56

## 2024-08-10 RX ADMIN — WATER 2000 MG: 1 INJECTION INTRAMUSCULAR; INTRAVENOUS; SUBCUTANEOUS at 15:11

## 2024-08-10 RX ADMIN — MIDODRINE HYDROCHLORIDE 5 MG: 5 TABLET ORAL at 07:52

## 2024-08-10 RX ADMIN — METHOCARBAMOL TABLETS 750 MG: 750 TABLET, COATED ORAL at 11:40

## 2024-08-10 RX ADMIN — OXYCODONE HYDROCHLORIDE 10 MG: 5 TABLET ORAL at 12:20

## 2024-08-10 RX ADMIN — OXYCODONE HYDROCHLORIDE 10 MG: 5 TABLET ORAL at 06:16

## 2024-08-10 RX ADMIN — METHOCARBAMOL TABLETS 750 MG: 750 TABLET, COATED ORAL at 19:56

## 2024-08-10 RX ADMIN — Medication 1000 UNITS: at 07:52

## 2024-08-10 RX ADMIN — WATER 2000 MG: 1 INJECTION INTRAMUSCULAR; INTRAVENOUS; SUBCUTANEOUS at 04:14

## 2024-08-10 RX ADMIN — BUMETANIDE 2 MG: 1 TABLET ORAL at 07:52

## 2024-08-10 RX ADMIN — SODIUM CHLORIDE, POTASSIUM CHLORIDE, SODIUM LACTATE AND CALCIUM CHLORIDE: 600; 310; 30; 20 INJECTION, SOLUTION INTRAVENOUS at 12:45

## 2024-08-10 RX ADMIN — OXYCODONE HYDROCHLORIDE 10 MG: 5 TABLET ORAL at 19:55

## 2024-08-10 RX ADMIN — RIFAXIMIN 550 MG: 550 TABLET ORAL at 19:56

## 2024-08-10 RX ADMIN — ASPIRIN 81 MG CHEWABLE TABLET 81 MG: 81 TABLET CHEWABLE at 07:52

## 2024-08-10 RX ADMIN — OXYCODONE HYDROCHLORIDE 10 MG: 5 TABLET ORAL at 01:00

## 2024-08-10 RX ADMIN — MIRTAZAPINE 7.5 MG: 15 TABLET, FILM COATED ORAL at 19:56

## 2024-08-10 RX ADMIN — RIFAXIMIN 550 MG: 550 TABLET ORAL at 07:56

## 2024-08-10 ASSESSMENT — PAIN - FUNCTIONAL ASSESSMENT: PAIN_FUNCTIONAL_ASSESSMENT: ACTIVITIES ARE NOT PREVENTED

## 2024-08-10 ASSESSMENT — PAIN DESCRIPTION - LOCATION
LOCATION: CHEST
LOCATION: LEG;FOOT

## 2024-08-10 ASSESSMENT — PAIN SCALES - GENERAL
PAINLEVEL_OUTOF10: 3
PAINLEVEL_OUTOF10: 9

## 2024-08-10 ASSESSMENT — PAIN DESCRIPTION - FREQUENCY: FREQUENCY: INTERMITTENT

## 2024-08-10 ASSESSMENT — PAIN SCALES - WONG BAKER: WONGBAKER_NUMERICALRESPONSE: HURTS A LITTLE BIT

## 2024-08-10 ASSESSMENT — PAIN DESCRIPTION - ONSET: ONSET: GRADUAL

## 2024-08-10 ASSESSMENT — PAIN DESCRIPTION - ORIENTATION: ORIENTATION: RIGHT;LOWER

## 2024-08-10 ASSESSMENT — PAIN DESCRIPTION - PAIN TYPE: TYPE: ACUTE PAIN

## 2024-08-10 NOTE — PROGRESS NOTES
Pharmacy Consultation Note  (Warfarin Dosing and Monitoring)    Initial consult date: 8/2  Consulting Provider: Latanya Dorsey is a 59 y.o. male for whom pharmacy has been asked to manage warfarin therapy.     Weight:   Wt Readings from Last 1 Encounters:   08/10/24 103.9 kg (229 lb)       TSH:    Lab Results   Component Value Date/Time    TSH 0.95 05/08/2024 02:24 AM       Hepatic Function Panel:                            Lab Results   Component Value Date/Time    ALKPHOS 142 08/03/2024 03:27 AM    ALT 9 08/03/2024 03:27 AM    AST 16 08/03/2024 03:27 AM    BILITOT 1.1 08/03/2024 03:27 AM    BILIDIR 0.2 07/14/2023 05:43 PM    IBILI 0.2 07/14/2023 05:43 PM       Current significant warfarin drug-drug interactions include: No significant interactions    Recent Labs     08/08/24  0751 08/09/24  0418 08/10/24  0415   HGB 10.3* 10.0* 9.1*     Date Warfarin Dose INR Heparin or LMWH Comment   8/3 HOLD 5 Lovenox DC'd    8/4 HOLD 4 --    8/5 4 mg 3.1 --    8/6 6 mg 2.6 --    8/7 6 mg 2.6 --    8/8 2 mg 3.7 --    8/9 HOLD 6.0     8/10 HOLD 9.5  Vit K 5mg x2 PO     Assessment:  Patient is a 59 y.o. male on warfarin for Atrial Fibrillation, Mechanical Heart Valve (mitral), and Mechanical Heart Valve (atrial).  Patient's home warfarin dosing regimen is 4mg daily per med rec but patient filled 3mg tabs most recently.   INR possibly elevated in setting of liver dysfunction.  Goal INR 2.5 - 3.5  INR 9.5 today, likely in setting of chf exacerbation and cirrhosis with hepatic encephalopathy    Plan:  HOLD warfarin tonight in setting of supratherapeutic INR.   2 doses of vitamin K PO given  Daily PT/INR until the INR is stable within the therapeutic range.  Pharmacist will follow and monitor/adjust dosing as necessary.    Thank you for this consult,    Nhung Brooks, PharmD, BCPS 8/10/2024 3:35 PM   Ext: [phone]

## 2024-08-10 NOTE — PROGRESS NOTES
Department of Internal Medicine  Nephrology Progress Note    Events reviewed.    SUBJECTIVE:   We are following for FRANTZ and fluid overload. Patient reports no complaints.     PHYSICAL EXAM:      Vitals:    VITALS:  BP (!) 94/56   Pulse 81   Temp 97.8 °F (36.6 °C) (Oral)   Resp 18   Ht 1.778 m (5' 10\")   Wt 103.9 kg (229 lb)   SpO2 95%   BMI 32.86 kg/m²   24HR BLOOD PRESSURE RANGE:  Systolic (24hrs), Av , Min:89 , Max:103   ; Diastolic (24hrs), Av, Min:51, Max:63    24HR INTAKE/OUTPUT:    Intake/Output Summary (Last 24 hours) at 8/10/2024 0901  Last data filed at 8/10/2024 0154  Gross per 24 hour   Intake 360 ml   Output 1450 ml   Net -1090 ml       Constitutional:  awake NAD  HEENT:  EOMI  Respiratory:  CTA  Cardiovascular/Edema:  S1 S2  Gastrointestinal:  +BS  Neurologic:  no deficits  Skin:  warm dry  Other:  BLE ++edema     DATA:    CBC with Differential:    Lab Results   Component Value Date/Time    WBC 9.5 08/10/2024 04:15 AM    RBC 3.84 08/10/2024 04:15 AM    HGB 9.1 08/10/2024 04:15 AM    HCT 31.7 08/10/2024 04:15 AM     2024 07:30 AM    MCV 82.6 08/10/2024 04:15 AM    MCH 23.7 08/10/2024 04:15 AM    MCHC 28.7 08/10/2024 04:15 AM    RDW 22.7 08/10/2024 04:15 AM    NRBC 1 2024 05:52 AM    METASPCT DUPLICATE 2023 04:12 AM    LYMPHOPCT 9 08/10/2024 04:15 AM    PROMYELOPCT DUPLICATE 2023 04:12 AM    MONOPCT 17 08/10/2024 04:15 AM    MYELOPCT DUPLICATE 2023 04:12 AM    EOSPCT 5 08/10/2024 04:15 AM    BASOPCT 1 08/10/2024 04:15 AM    MONOSABS 1.65 08/10/2024 04:15 AM    LYMPHSABS 0.89 08/10/2024 04:15 AM    EOSABS 0.45 08/10/2024 04:15 AM    BASOSABS 0.06 08/10/2024 04:15 AM     CMP:    Lab Results   Component Value Date/Time     08/10/2024 04:15 AM    K 3.1 08/10/2024 04:15 AM    K 3.9 09/15/2022 03:08 PM    CL 93 08/10/2024 04:15 AM    CO2 22 08/10/2024 04:15 AM    BUN 74 08/10/2024 04:15 AM    CREATININE 4.5 08/10/2024 04:15 AM    GFRAA >60 09/15/2022  03:08 PM    LABGLOM 15 08/10/2024 04:15 AM    LABGLOM 31 03/05/2024 05:57 AM    GLUCOSE 105 08/10/2024 04:15 AM    CALCIUM 8.7 08/10/2024 04:15 AM    BILITOT 1.1 08/03/2024 03:27 AM    ALKPHOS 142 08/03/2024 03:27 AM    AST 16 08/03/2024 03:27 AM    ALT 9 08/03/2024 03:27 AM     Magnesium:    Lab Results   Component Value Date/Time    MG 1.6 08/08/2024 03:50 PM     Phosphorus:    Lab Results   Component Value Date/Time    PHOS 2.6 08/03/2024 03:27 AM     Radiology Review:        XR CHEST PORTABLE 8/2/24    IMPRESSION:  Cardiomegaly with underlying chronic interstitial changes seen in the lung  fields. No new focal parenchymal opacification present.         BRIEF SUMMARY OF INITIAL CONSULT:    Briefly Mr. Dorsey is a 59-year-old man with history of CKD stage IIIa, with large proteinuria, probably due to nephrosclerosis, baseline creatinine 1.5-1.7 mg/dL, HTN, CAD status post CABG, HFpEF 55%, valvular heart disease status post AVR and MVR both mechanical valves, post pacemaker placement, hyperlipidemia, CVA, testicular cancer status postchemotherapy, alcohol abuse, fatty liver, who was admitted on 8/2/2024 for shortness of breath and bilateral lower extremity edema, his proBNP was 1489, chest x-ray showed cardiomegaly with underlying chronic interstitial changes. Creatinine on admission 1.8 mg/dL has increased to 2.4 mg/dL, reason for this consultation. Home medications, empagliflozin, metoprolol, cholecalciferol, atorvastatin.     IMPRESSION/RECOMMENDATIONS:       FRANTZ on CKD, most likely hemodynamically mediated 2/2 acute decompensated heart failure, cardiorenal syndrome, baseline creatinine 1.5-1.8 mg/dL.  Creatinine continues to worsen to 4.5 mg/dL, continue Culver catheter per urology, hold Bumex start IV fluids     CKD stage IIIa, probably due to nephrosclerosis, baseline creatinine 1.5-1.8 mg/dL    Hypokalemia 2/2 diuretics, to replace  HFpEF 40-45%, proBNP 1489, on metoprolol, hydralazine, Bumex   HTN,

## 2024-08-10 NOTE — PROGRESS NOTES
Infectious Disease  Progress Note  [facility]    Chief Complaint: right leg redness.     Subjective: Patient resting in bed no nausea vomiting or diarrhea.  Antibiotic plan explained.  He stated no one else is telling him anything.  I am under the impression this has been ongoing    Scheduled Meds:   bumetanide  2 mg Oral BID    ceFAZolin  2,000 mg IntraVENous Q8H    tamsulosin  0.4 mg Oral Nightly    [Held by provider] metOLazone  5 mg Oral Daily    warfarin placeholder: dosing by pharmacy   Other RX Placeholder    methocarbamol  750 mg Oral 4x Daily    aspirin  81 mg Oral Daily    atorvastatin  20 mg Oral Nightly    metoprolol succinate  25 mg Oral BID    Vitamin D  1,000 Units Oral Daily    pantoprazole  40 mg Oral BID AC    lactulose  20 g Oral Daily    busPIRone  5 mg Oral TID    folic acid  1 mg Oral Daily    [Held by provider] hydrALAZINE  10 mg Oral 3 times per day    isosorbide dinitrate  10 mg Oral TID    midodrine  5 mg Oral TID WC    mirtazapine  7.5 mg Oral Nightly    nicotine  1 patch TransDERmal Daily    thiamine  100 mg Oral Daily    rifAXIMin  550 mg Oral BID    sodium chloride flush  5-40 mL IntraVENous 2 times per day     Continuous Infusions:   sodium chloride      sodium chloride       PRN Meds:oxyCODONE, oxyCODONE, sodium chloride, sodium chloride flush, sodium chloride, potassium chloride **OR** potassium alternative oral replacement **OR** potassium chloride, magnesium sulfate, polyethylene glycol, prochlorperazine **OR** prochlorperazine    Prior to Admission medications    Medication Sig Start Date End Date Taking? Authorizing Provider   warfarin (COUMADIN) 3 MG tablet Take 1 tablet by mouth daily  Patient taking differently: Take 4 mg by mouth daily 5/19/24   Uzma Burgos MD   busPIRone (BUSPAR) 5 MG tablet Take 1 tablet by mouth 3 times daily 5/19/24   Uzma Burgos MD   folic acid (FOLVITE) 1 MG tablet Take 1 tablet by mouth daily 5/20/24   Uzma Burgos MD   hydrALAZINE

## 2024-08-10 NOTE — PROGRESS NOTES
[facility] Hospitalist   Progress Note    Admitting Date and Time: 8/2/2024 12:11 PM  Admit Dx: Anemia [D64.9]  Anemia, unspecified type [D64.9]    Subjective:    Patient was admitted with Anemia [D64.9]  Anemia, unspecified type [D64.9]. Patient   denies fever, chills, cp, sob, n/v.     ceFAZolin  2,000 mg IntraVENous Q12H    bumetanide  2 mg Oral BID    tamsulosin  0.4 mg Oral Nightly    [Held by provider] metOLazone  5 mg Oral Daily    warfarin placeholder: dosing by pharmacy   Other RX Placeholder    methocarbamol  750 mg Oral 4x Daily    aspirin  81 mg Oral Daily    atorvastatin  20 mg Oral Nightly    metoprolol succinate  25 mg Oral BID    Vitamin D  1,000 Units Oral Daily    pantoprazole  40 mg Oral BID AC    lactulose  20 g Oral Daily    busPIRone  5 mg Oral TID    folic acid  1 mg Oral Daily    [Held by provider] hydrALAZINE  10 mg Oral 3 times per day    isosorbide dinitrate  10 mg Oral TID    midodrine  5 mg Oral TID WC    mirtazapine  7.5 mg Oral Nightly    nicotine  1 patch TransDERmal Daily    thiamine  100 mg Oral Daily    rifAXIMin  550 mg Oral BID    sodium chloride flush  5-40 mL IntraVENous 2 times per day     oxyCODONE, 5 mg, Q6H PRN  oxyCODONE, 10 mg, Q6H PRN  sodium chloride, , PRN  sodium chloride flush, 5-40 mL, PRN  sodium chloride, , PRN  potassium chloride, 40 mEq, PRN   Or  potassium alternative oral replacement, 40 mEq, PRN   Or  potassium chloride, 10 mEq, PRN  magnesium sulfate, 2,000 mg, PRN  polyethylene glycol, 17 g, Daily PRN  prochlorperazine, 10 mg, Q8H PRN   Or  prochlorperazine, 10 mg, Q6H PRN         Objective:    BP (!) 104/51   Pulse (!) 104   Temp 97.5 °F (36.4 °C) (Oral)   Resp 16   Ht 1.778 m (5' 10\")   Wt 103.9 kg (229 lb)   SpO2 95%   BMI 32.86 kg/m²   Skin: warm and dry, no rash or erythema  Pulmonary/Chest: clear to auscultation bilaterally- no wheezes, rales or rhonchi, normal air movement, no respiratory distress  Cardiovascular: rhythm reg  at rate of 100  Abdomen: soft, non-tender, non-distended, normal bowel sounds, no masses or organomegaly  Extremities: no cyanosis, no clubbing, and pos for mild b/l le edema. Area of left chest noted to have swelling no erythema      Recent Labs     08/08/24  1550 08/09/24  0418 08/10/24  0415    138 136   K 3.0* 3.4* 3.1*   CL 94* 94* 93*   CO2 24 22 22   BUN 61* 63* 74*   CREATININE 3.5* 3.7* 4.5*   GLUCOSE 120* 101* 105*   CALCIUM 9.0 9.1 8.7       Recent Labs     08/08/24  0751 08/09/24  0418 08/10/24  0415   WBC 11.2 10.9 9.5   RBC 4.40 4.20 3.84   HGB 10.3* 10.0* 9.1*   HCT 36.0* 34.6* 31.7*   MCV 81.8 82.4 82.6   MCH 23.4* 23.8* 23.7*   MCHC 28.6* 28.9* 28.7*   RDW 22.0* 22.5* 22.7*   MPV Can not be calculated 11.7 11.8       CBC with Differential:    Lab Results   Component Value Date/Time    WBC 9.5 08/10/2024 04:15 AM    RBC 3.84 08/10/2024 04:15 AM    HGB 9.1 08/10/2024 04:15 AM    HCT 31.7 08/10/2024 04:15 AM     08/05/2024 07:30 AM    MCV 82.6 08/10/2024 04:15 AM    MCH 23.7 08/10/2024 04:15 AM    MCHC 28.7 08/10/2024 04:15 AM    RDW 22.7 08/10/2024 04:15 AM    NRBC 1 08/07/2024 05:52 AM    METASPCT DUPLICATE 07/18/2023 04:12 AM    LYMPHOPCT 9 08/10/2024 04:15 AM    PROMYELOPCT DUPLICATE 07/18/2023 04:12 AM    MONOPCT 17 08/10/2024 04:15 AM    MYELOPCT DUPLICATE 07/18/2023 04:12 AM    EOSPCT 5 08/10/2024 04:15 AM    BASOPCT 1 08/10/2024 04:15 AM    MONOSABS 1.65 08/10/2024 04:15 AM    LYMPHSABS 0.89 08/10/2024 04:15 AM    EOSABS 0.45 08/10/2024 04:15 AM    BASOSABS 0.06 08/10/2024 04:15 AM     BMP:    Lab Results   Component Value Date/Time     08/10/2024 04:15 AM    K 3.1 08/10/2024 04:15 AM    K 3.9 09/15/2022 03:08 PM    CL 93 08/10/2024 04:15 AM    CO2 22 08/10/2024 04:15 AM    BUN 74 08/10/2024 04:15 AM    CREATININE 4.5 08/10/2024 04:15 AM    CALCIUM 8.7 08/10/2024 04:15 AM    GFRAA >60 09/15/2022 03:08 PM    LABGLOM 15 08/10/2024 04:15 AM    LABGLOM 31 03/05/2024 05:57 AM     GLUCOSE 105 08/10/2024 04:15 AM        Radiology:   Vascular duplex lower extremity venous right   Final Result   No evidence of DVT in the right lower extremity.         XR CHEST PORTABLE   Final Result   Cardiomegaly with underlying chronic interstitial changes seen in the lung   fields. No new focal parenchymal opacification present.         US BREAST LIMITED LEFT    (Results Pending)       Assessment:    Principal Problem:    Anemia  Active Problems:    H/O mitral valve replacement with mechanical valve    H/O mechanical aortic valve replacement    Supratherapeutic INR    AV block    Acute blood loss anemia    Heart failure with mid-range ejection fraction (HFmEF) (HCC)    Acute on chronic combined systolic and diastolic congestive heart failure (HCC)    Normally functioning cardiac pacemaker present    Hepatic cirrhosis (HCC)    Acute on chronic systolic (congestive) heart failure (HCC)  Resolved Problems:    * No resolved hospital problems. *      Plan:  Acute on chronic systolic chf pt off bumex gtt continue medication  cardio following  Acidosis monitor  Varinder  bumex gtt stopped   renal following  Hypomagnesemia monitor and replace prn  Anemia monitor s/p transfusion hematology on consult.  Cirrhosis with hepatic encephalopathy likely due to alcohol abuse  continue lactulose/rifaximin  continue thiaimine  Hyperlipidemia continue statin  Htn continue med  RLE cellulitis continue abx  NSVT pt had 19 beats of vtach per nursing. Replace K and monitor mag and replace prn. Cardio on consult  Hypokalemia monitor and replace prn  Elevated inr pt given vit k continue to monitor. Pt not actively bleeding    Encourage activity. Monitor creat    Electronically signed by Nikhil Preston DO on 8/10/2024 at 10:55 AM

## 2024-08-10 NOTE — PLAN OF CARE
Problem: ABCDS Injury Assessment  Goal: Absence of physical injury  8/10/2024 0815 by Elana Squires, RN  Outcome: Progressing  8/9/2024 2327 by Derian Marina RN  Outcome: Progressing     Problem: Discharge Planning  Goal: Discharge to home or other facility with appropriate resources  8/10/2024 0815 by Elana Squires, RN  Outcome: Progressing  Flowsheets (Taken 8/10/2024 0807)  Discharge to home or other facility with appropriate resources:   Identify barriers to discharge with patient and caregiver   Arrange for needed discharge resources and transportation as appropriate   Identify discharge learning needs (meds, wound care, etc)   Arrange for interpreters to assist at discharge as needed   Refer to discharge planning if patient needs post-hospital services based on physician order or complex needs related to functional status, cognitive ability or social support system  8/9/2024 2327 by Derian Marina RN  Outcome: Progressing  Flowsheets (Taken 8/9/2024 2000)  Discharge to home or other facility with appropriate resources:   Identify barriers to discharge with patient and caregiver   Arrange for needed discharge resources and transportation as appropriate     Problem: Safety - Adult  Goal: Free from fall injury  8/10/2024 0815 by Elana Squires, RN  Outcome: Progressing  8/9/2024 2327 by Derian Marina RN  Outcome: Progressing     Problem: Pain  Goal: Verbalizes/displays adequate comfort level or baseline comfort level  8/10/2024 0815 by Elana Squires, RN  Outcome: Progressing  8/9/2024 2327 by Derian Marina RN  Outcome: Progressing     Problem: Skin/Tissue Integrity  Goal: Absence of new skin breakdown  Description: 1.  Monitor for areas of redness and/or skin breakdown  2.  Assess vascular access sites hourly  3.  Every 4-6 hours minimum:  Change oxygen saturation probe site  4.  Every 4-6 hours:  If on nasal continuous positive airway pressure, respiratory therapy assess nares and determine need for  appliance change or resting period.  8/10/2024 0815 by Elana Squires, RN  Outcome: Progressing  8/9/2024 2327 by Derian Marina RN  Outcome: Progressing     Problem: Chronic Conditions and Co-morbidities  Goal: Patient's chronic conditions and co-morbidity symptoms are monitored and maintained or improved  8/10/2024 0815 by Elana Squires, RN  Outcome: Progressing  Flowsheets (Taken 8/10/2024 0807)  Care Plan - Patient's Chronic Conditions and Co-Morbidity Symptoms are Monitored and Maintained or Improved:   Monitor and assess patient's chronic conditions and comorbid symptoms for stability, deterioration, or improvement   Collaborate with multidisciplinary team to address chronic and comorbid conditions and prevent exacerbation or deterioration   Update acute care plan with appropriate goals if chronic or comorbid symptoms are exacerbated and prevent overall improvement and discharge  8/9/2024 2327 by Derian Marina, RN  Outcome: Progressing  Flowsheets (Taken 8/9/2024 2000)  Care Plan - Patient's Chronic Conditions and Co-Morbidity Symptoms are Monitored and Maintained or Improved:   Monitor and assess patient's chronic conditions and comorbid symptoms for stability, deterioration, or improvement   Collaborate with multidisciplinary team to address chronic and comorbid conditions and prevent exacerbation or deterioration

## 2024-08-10 NOTE — PROGRESS NOTES
[facility] Inpatient Progress Note    Patient is a 59 y.o. male of Jama Skinner PA seen in hospital follow up.     Chief complaint: CHF    HPI: Some SOB. No CP.     Patient Active Problem List   Diagnosis    Chest pain    Closed fracture of nasal bones    Injury of face    Retroperitoneal hematoma    Vitamin D deficiency    Acute chest pain    Acute cholecystitis    Gastrointestinal hemorrhage    Polyp of colon    Right upper quadrant abdominal pain    Diarrhea    Current moderate episode of major depressive disorder without prior episode (HCC)    Hypertension    H/O mitral valve replacement with mechanical valve    H/O mechanical aortic valve replacement    Coronary artery disease involving native coronary artery of native heart without angina pectoris    Paravalvular leak (prosthetic valve)    Hx of CABG    Warfarin anticoagulation    Supratherapeutic INR    Moderate obesity    Dyspnea    Alcohol withdrawal syndrome without complication (HCC)    DTs (delirium tremens) (HCC)    Anemia    Severe protein-calorie malnutrition (HCC)    Acute decompensated heart failure (HCC)    Aortic valve disease    Mitral valve disease    AV block    Pure hypercholesterolemia    Chronic obstructive pulmonary disease (HCC)    Loculated pleural effusion    Troponin level elevated    Stage 2 chronic kidney disease    Alcohol use    Breast swelling    Pleural effusion    Hypophosphatemia    Acute congestive heart failure (HCC)    FRANTZ (acute kidney injury) (HCC)    Acidosis    Hyperglycemia    Hyperlipidemia    Alcoholic liver disease, unspecified (HCC)    Thrombocytopenia (HCC)    Delirium    Acute blood loss anemia    Heart failure with mid-range ejection fraction (HFmEF) (HCC)    Acute on chronic combined systolic and diastolic congestive heart failure (HCC)    Normally functioning cardiac pacemaker present    Hepatic cirrhosis (HCC)    Acute on chronic systolic (congestive) heart failure (HCC)       No Known  hydrALAZINE (APRESOLINE) tablet 10 mg  10 mg Oral 3 times per day Rico Pelaez MD   10 mg at 08/05/24 1338    isosorbide dinitrate (ISORDIL) tablet 10 mg  10 mg Oral TID Rico Pelaez MD   10 mg at 08/10/24 0752    midodrine (PROAMATINE) tablet 5 mg  5 mg Oral TID WC Rico Pelaez MD   5 mg at 08/10/24 0752    mirtazapine (REMERON) tablet 7.5 mg  7.5 mg Oral Nightly Rico Pelaez MD   7.5 mg at 08/09/24 1955    nicotine (NICODERM CQ) 21 MG/24HR 1 patch  1 patch TransDERmal Daily Rico Pelaez MD   1 patch at 08/08/24 0843    thiamine tablet 100 mg  100 mg Oral Daily Rico Pelaez MD   100 mg at 08/10/24 0752    rifAXIMin (XIFAXAN) tablet 550 mg  550 mg Oral BID Rico Pelaez MD   550 mg at 08/10/24 0756    sodium chloride flush 0.9 % injection 5-40 mL  5-40 mL IntraVENous 2 times per day Rico Pelaez MD   10 mL at 08/10/24 0753    sodium chloride flush 0.9 % injection 5-40 mL  5-40 mL IntraVENous PRN Rico Pelaez MD   10 mL at 08/09/24 1136    0.9 % sodium chloride infusion   IntraVENous PRN Rico Pelaez MD        potassium chloride (KLOR-CON M) extended release tablet 40 mEq  40 mEq Oral PRN Rico Pelaez MD        Or    potassium bicarb-citric acid (EFFER-K) effervescent tablet 40 mEq  40 mEq Oral PRRico Dominique MD        Or    potassium chloride 10 mEq/100 mL IVPB (Peripheral Line)  10 mEq IntraVENous PRN Rico Pelaez MD        magnesium sulfate 2000 mg in 50 mL IVPB premix  2,000 mg IntraVENous PRN Rico Pelaez MD        polyethylene glycol (GLYCOLAX) packet 17 g  17 g Oral Daily PRN Rico Pelaez MD        prochlorperazine (COMPAZINE) tablet 10 mg  10 mg Oral Q8H PRN Rico Pelaez MD        Or    prochlorperazine (COMPAZINE) injection 10 mg  10 mg IntraVENous Q6H PRN Rico Pelaez MD           Review of systems:   Heart: as above   Lungs: as above   Eyes: denies changes in vision or discharge.   Ears: denies changes in hearing or pain.   Nose: denies epistaxis or masses   Throat:  08/10/2024 04:15 AM    BUN 74 08/10/2024 04:15 AM    CREATININE 4.5 08/10/2024 04:15 AM    CALCIUM 8.7 08/10/2024 04:15 AM    GFRAA >60 09/15/2022 03:08 PM    LABGLOM 15 08/10/2024 04:15 AM    LABGLOM 31 03/05/2024 05:57 AM     Magnesium:    Lab Results   Component Value Date/Time    MG 1.6 08/08/2024 03:50 PM     Cardiac Enzymes:   Lab Results   Component Value Date    TROPHS 42 (H) 08/02/2024    TROPHS 45 (H) 08/02/2024    TROPHS 38 (H) 05/09/2024      PT/INR:    Lab Results   Component Value Date/Time    PROTIME 103.3 08/10/2024 04:15 AM    PROTIME 96.0 09/15/2022 01:07 PM    INR 9.4 08/10/2024 04:15 AM     TSH:    Lab Results   Component Value Date/Time    TSH 0.95 05/08/2024 02:24 AM     [facility] CONCLUSIONS 3/2023:  1. Single vessel disease.  2. Patent LIMA to LAD.     TTE 5/10/24 KA:    Left Ventricle: The EF by visual approximation is 40 - 45%. Findings consistent with moderate concentric hypertrophy. Elevated left ventricular filling pressure.    Right Ventricle: Right ventricle size is normal. Reduced systolic function.    Aortic Valve: Not well visualized. Mechanical valve. AV mean gradient is 12 mmHg. LVOT:AV VTI Index is 0.34. AV area by continuity VTI is 1.1 cm2. AV area by peak velocity is 1.0 cm2.    Mitral Valve: Not well visualized. Mechanical valve. MV mean gradient is 4 mmHg.    Tricuspid Valve: Moderate regurgitation.    Pulmonic Valve: The pulmonic valve was not well visualized.    Left Atrium: Left atrium is severely dilated.    Image quality is technically difficult. Contrast used: Definity. Technically difficult study with poor endocardial visualization, technically difficult study due to patient's body habitus and procedure performed with the patient in a supine position.    ASSESSMENT & PLAN:    Patient Active Problem List   Diagnosis    Chest pain    Closed fracture of nasal bones    Injury of face    Retroperitoneal hematoma    Vitamin D deficiency    Acute chest pain    Acute cholecystitis     Gastrointestinal hemorrhage    Polyp of colon    Right upper quadrant abdominal pain    Diarrhea    Current moderate episode of major depressive disorder without prior episode (HCC)    Hypertension    H/O mitral valve replacement with mechanical valve    H/O mechanical aortic valve replacement    Coronary artery disease involving native coronary artery of native heart without angina pectoris    Paravalvular leak (prosthetic valve)    Hx of CABG    Warfarin anticoagulation    Supratherapeutic INR    Moderate obesity    Dyspnea    Alcohol withdrawal syndrome without complication (HCC)    DTs (delirium tremens) (HCC)    Anemia    Severe protein-calorie malnutrition (HCC)    Acute decompensated heart failure (HCC)    Aortic valve disease    Mitral valve disease    AV block    Pure hypercholesterolemia    Chronic obstructive pulmonary disease (HCC)    Loculated pleural effusion    Troponin level elevated    Stage 2 chronic kidney disease    Alcohol use    Breast swelling    Pleural effusion    Hypophosphatemia    Acute congestive heart failure (HCC)    FRANTZ (acute kidney injury) (HCC)    Acidosis    Hyperglycemia    Hyperlipidemia    Alcoholic liver disease, unspecified (HCC)    Thrombocytopenia (HCC)    Delirium    Acute blood loss anemia    Heart failure with mid-range ejection fraction (HFmEF) (HCC)    Acute on chronic combined systolic and diastolic congestive heart failure (HCC)    Normally functioning cardiac pacemaker present    Hepatic cirrhosis (HCC)    Acute on chronic systolic (congestive) heart failure (HCC)     1. Acute on chronic heart failure:    EF 40-45% by 5/2024 echo.     BB/nitrate. Hydralazine held. D/c empagliflozin due to worsening renal function.     Renal following.     2. VHD: Redo mechanical AVR/MVR 3/2023 2/2 endocarditis with perivalvular mitral regurgitation. Mod TR.     INR 9.4 today. Vit K given today. Follow INR.     3. CAD:    LIMA to LAD original AVR/MVR surgery 2020.    4. Pacer:

## 2024-08-10 NOTE — PLAN OF CARE
Problem: ABCDS Injury Assessment  Goal: Absence of physical injury  Outcome: Progressing     Problem: Discharge Planning  Goal: Discharge to home or other facility with appropriate resources  Outcome: Progressing  Flowsheets (Taken 8/9/2024 2000)  Discharge to home or other facility with appropriate resources:   Identify barriers to discharge with patient and caregiver   Arrange for needed discharge resources and transportation as appropriate     Problem: Safety - Adult  Goal: Free from fall injury  Outcome: Progressing     Problem: Pain  Goal: Verbalizes/displays adequate comfort level or baseline comfort level  Outcome: Progressing     Problem: Skin/Tissue Integrity  Goal: Absence of new skin breakdown  Description: 1.  Monitor for areas of redness and/or skin breakdown  2.  Assess vascular access sites hourly  3.  Every 4-6 hours minimum:  Change oxygen saturation probe site  4.  Every 4-6 hours:  If on nasal continuous positive airway pressure, respiratory therapy assess nares and determine need for appliance change or resting period.  Outcome: Progressing     Problem: Chronic Conditions and Co-morbidities  Goal: Patient's chronic conditions and co-morbidity symptoms are monitored and maintained or improved  Outcome: Progressing  Flowsheets (Taken 8/9/2024 2000)  Care Plan - Patient's Chronic Conditions and Co-Morbidity Symptoms are Monitored and Maintained or Improved:   Monitor and assess patient's chronic conditions and comorbid symptoms for stability, deterioration, or improvement   Collaborate with multidisciplinary team to address chronic and comorbid conditions and prevent exacerbation or deterioration

## 2024-08-10 NOTE — PROGRESS NOTES
Dr. Strong notified of BMP results, no new orders at this time.   Calcipotriene Pregnancy And Lactation Text: This medication has not been proven safe during pregnancy. It is unknown if this medication is excreted in breast milk.

## 2024-08-11 VITALS
TEMPERATURE: 98.2 F | WEIGHT: 204.37 LBS | RESPIRATION RATE: 18 BRPM | HEIGHT: 70 IN | HEART RATE: 100 BPM | OXYGEN SATURATION: 99 % | DIASTOLIC BLOOD PRESSURE: 61 MMHG | BODY MASS INDEX: 29.26 KG/M2 | SYSTOLIC BLOOD PRESSURE: 98 MMHG

## 2024-08-11 LAB
ALBUMIN SERPL-MCNC: 4.1 G/DL (ref 3.5–5.2)
ALP SERPL-CCNC: 116 U/L (ref 40–129)
ALT SERPL-CCNC: <5 U/L (ref 0–40)
ANION GAP SERPL CALCULATED.3IONS-SCNC: 19 MMOL/L (ref 7–16)
ANION GAP SERPL CALCULATED.3IONS-SCNC: 22 MMOL/L (ref 7–16)
AST SERPL-CCNC: 22 U/L (ref 0–39)
BASOPHILS # BLD: 0.18 K/UL (ref 0–0.2)
BASOPHILS NFR BLD: 3 % (ref 0–2)
BILIRUB DIRECT SERPL-MCNC: 0.3 MG/DL (ref 0–0.3)
BILIRUB INDIRECT SERPL-MCNC: 0.2 MG/DL (ref 0–1)
BILIRUB SERPL-MCNC: 0.5 MG/DL (ref 0–1.2)
BUN SERPL-MCNC: 83 MG/DL (ref 6–20)
BUN SERPL-MCNC: 89 MG/DL (ref 6–20)
CALCIUM SERPL-MCNC: 8.9 MG/DL (ref 8.6–10.2)
CALCIUM SERPL-MCNC: 9 MG/DL (ref 8.6–10.2)
CHLORIDE SERPL-SCNC: 94 MMOL/L (ref 98–107)
CHLORIDE SERPL-SCNC: 96 MMOL/L (ref 98–107)
CO2 SERPL-SCNC: 20 MMOL/L (ref 22–29)
CO2 SERPL-SCNC: 20 MMOL/L (ref 22–29)
CREAT SERPL-MCNC: 5 MG/DL (ref 0.7–1.2)
CREAT SERPL-MCNC: 5.1 MG/DL (ref 0.7–1.2)
EOSINOPHIL # BLD: 0.37 K/UL (ref 0.05–0.5)
EOSINOPHILS RELATIVE PERCENT: 5 % (ref 0–6)
ERYTHROCYTE [DISTWIDTH] IN BLOOD BY AUTOMATED COUNT: 22.2 % (ref 11.5–15)
GFR, ESTIMATED: 12 ML/MIN/1.73M2
GFR, ESTIMATED: 13 ML/MIN/1.73M2
GLUCOSE SERPL-MCNC: 100 MG/DL (ref 74–99)
GLUCOSE SERPL-MCNC: 96 MG/DL (ref 74–99)
HCT VFR BLD AUTO: 27.2 % (ref 37–54)
HGB BLD-MCNC: 7.9 G/DL (ref 12.5–16.5)
INR PPP: >10
LYMPHOCYTES NFR BLD: 0.61 K/UL (ref 1.5–4)
LYMPHOCYTES RELATIVE PERCENT: 9 % (ref 20–42)
MAGNESIUM SERPL-MCNC: 1.8 MG/DL (ref 1.6–2.6)
MCH RBC QN AUTO: 23.8 PG (ref 26–35)
MCHC RBC AUTO-ENTMCNC: 29 G/DL (ref 32–34.5)
MCV RBC AUTO: 81.9 FL (ref 80–99.9)
MICROORGANISM SPEC CULT: NORMAL
MICROORGANISM SPEC CULT: NORMAL
MONOCYTES NFR BLD: 0.91 K/UL (ref 0.1–0.95)
MONOCYTES NFR BLD: 13 % (ref 2–12)
NEUTROPHILS NFR BLD: 70 % (ref 43–80)
NEUTS SEG NFR BLD: 4.93 K/UL (ref 1.8–7.3)
PHOSPHATE SERPL-MCNC: 7.7 MG/DL (ref 2.5–4.5)
PLATELET, FLUORESCENCE: 122 K/UL (ref 130–450)
PMV BLD AUTO: ABNORMAL FL (ref 7–12)
POTASSIUM SERPL-SCNC: 3.1 MMOL/L (ref 3.5–5)
POTASSIUM SERPL-SCNC: 4.2 MMOL/L (ref 3.5–5)
PROT SERPL-MCNC: 7.5 G/DL (ref 6.4–8.3)
PROTHROMBIN TIME: 111.5 SEC (ref 9.3–12.4)
RBC # BLD AUTO: 3.32 M/UL (ref 3.8–5.8)
RBC # BLD: ABNORMAL 10*6/UL
SERVICE CMNT-IMP: NORMAL
SERVICE CMNT-IMP: NORMAL
SODIUM SERPL-SCNC: 135 MMOL/L (ref 132–146)
SODIUM SERPL-SCNC: 136 MMOL/L (ref 132–146)
SPECIMEN DESCRIPTION: NORMAL
SPECIMEN DESCRIPTION: NORMAL
WBC OTHER # BLD: 7 K/UL (ref 4.5–11.5)

## 2024-08-11 PROCEDURE — 80048 BASIC METABOLIC PNL TOTAL CA: CPT

## 2024-08-11 PROCEDURE — 80053 COMPREHEN METABOLIC PANEL: CPT

## 2024-08-11 PROCEDURE — 84100 ASSAY OF PHOSPHORUS: CPT

## 2024-08-11 PROCEDURE — 85610 PROTHROMBIN TIME: CPT

## 2024-08-11 PROCEDURE — 85025 COMPLETE CBC W/AUTO DIFF WBC: CPT

## 2024-08-11 PROCEDURE — 6360000002 HC RX W HCPCS: Performed by: CLINICAL NURSE SPECIALIST

## 2024-08-11 PROCEDURE — 6370000000 HC RX 637 (ALT 250 FOR IP): Performed by: NURSE PRACTITIONER

## 2024-08-11 PROCEDURE — 6360000002 HC RX W HCPCS: Performed by: HOSPITALIST

## 2024-08-11 PROCEDURE — 82248 BILIRUBIN DIRECT: CPT

## 2024-08-11 PROCEDURE — 6370000000 HC RX 637 (ALT 250 FOR IP): Performed by: INTERNAL MEDICINE

## 2024-08-11 PROCEDURE — 99233 SBSQ HOSP IP/OBS HIGH 50: CPT | Performed by: INTERNAL MEDICINE

## 2024-08-11 PROCEDURE — 6370000000 HC RX 637 (ALT 250 FOR IP)

## 2024-08-11 PROCEDURE — 2580000003 HC RX 258: Performed by: INTERNAL MEDICINE

## 2024-08-11 PROCEDURE — 6370000000 HC RX 637 (ALT 250 FOR IP): Performed by: CLINICAL NURSE SPECIALIST

## 2024-08-11 PROCEDURE — 99232 SBSQ HOSP IP/OBS MODERATE 35: CPT | Performed by: INTERNAL MEDICINE

## 2024-08-11 PROCEDURE — 83735 ASSAY OF MAGNESIUM: CPT

## 2024-08-11 PROCEDURE — 6370000000 HC RX 637 (ALT 250 FOR IP): Performed by: STUDENT IN AN ORGANIZED HEALTH CARE EDUCATION/TRAINING PROGRAM

## 2024-08-11 PROCEDURE — 2580000003 HC RX 258: Performed by: CLINICAL NURSE SPECIALIST

## 2024-08-11 PROCEDURE — 2580000003 HC RX 258: Performed by: HOSPITALIST

## 2024-08-11 PROCEDURE — 2060000000 HC ICU INTERMEDIATE R&B

## 2024-08-11 RX ORDER — CEPHALEXIN 500 MG/1
500 CAPSULE ORAL EVERY 12 HOURS SCHEDULED
Status: DISCONTINUED | OUTPATIENT
Start: 2024-08-11 | End: 2024-08-14

## 2024-08-11 RX ORDER — CEPHALEXIN 500 MG/1
500 CAPSULE ORAL ONCE
Status: COMPLETED | OUTPATIENT
Start: 2024-08-11 | End: 2024-08-11

## 2024-08-11 RX ORDER — PHYTONADIONE 5 MG/1
5 TABLET ORAL ONCE
Status: COMPLETED | OUTPATIENT
Start: 2024-08-11 | End: 2024-08-11

## 2024-08-11 RX ORDER — POTASSIUM CHLORIDE 1500 MG/1
40 TABLET, EXTENDED RELEASE ORAL ONCE
Status: COMPLETED | OUTPATIENT
Start: 2024-08-11 | End: 2024-08-11

## 2024-08-11 RX ORDER — PHYTONADIONE 5 MG/1
10 TABLET ORAL ONCE
Status: COMPLETED | OUTPATIENT
Start: 2024-08-11 | End: 2024-08-11

## 2024-08-11 RX ADMIN — PANTOPRAZOLE SODIUM 40 MG: 40 TABLET, DELAYED RELEASE ORAL at 16:37

## 2024-08-11 RX ADMIN — SODIUM CHLORIDE, PRESERVATIVE FREE 10 ML: 5 INJECTION INTRAVENOUS at 20:23

## 2024-08-11 RX ADMIN — TAMSULOSIN HYDROCHLORIDE 0.4 MG: 0.4 CAPSULE ORAL at 20:20

## 2024-08-11 RX ADMIN — METOPROLOL SUCCINATE 25 MG: 25 TABLET, EXTENDED RELEASE ORAL at 09:52

## 2024-08-11 RX ADMIN — PHYTONADIONE 5 MG: 5 TABLET ORAL at 05:20

## 2024-08-11 RX ADMIN — ATORVASTATIN CALCIUM 20 MG: 20 TABLET, FILM COATED ORAL at 20:20

## 2024-08-11 RX ADMIN — RIFAXIMIN 550 MG: 550 TABLET ORAL at 20:20

## 2024-08-11 RX ADMIN — CEPHALEXIN 500 MG: 500 CAPSULE ORAL at 11:46

## 2024-08-11 RX ADMIN — MIDODRINE HYDROCHLORIDE 5 MG: 5 TABLET ORAL at 16:37

## 2024-08-11 RX ADMIN — RIFAXIMIN 550 MG: 550 TABLET ORAL at 09:51

## 2024-08-11 RX ADMIN — ISOSORBIDE DINITRATE 10 MG: 10 TABLET ORAL at 12:29

## 2024-08-11 RX ADMIN — BUSPIRONE HYDROCHLORIDE 5 MG: 5 TABLET ORAL at 20:21

## 2024-08-11 RX ADMIN — SODIUM CHLORIDE 25 MG: 9 INJECTION, SOLUTION INTRAVENOUS at 14:40

## 2024-08-11 RX ADMIN — METHOCARBAMOL TABLETS 750 MG: 750 TABLET, COATED ORAL at 16:37

## 2024-08-11 RX ADMIN — ASPIRIN 81 MG CHEWABLE TABLET 81 MG: 81 TABLET CHEWABLE at 09:51

## 2024-08-11 RX ADMIN — FOLIC ACID 1 MG: 1 TABLET ORAL at 09:51

## 2024-08-11 RX ADMIN — MIDODRINE HYDROCHLORIDE 5 MG: 5 TABLET ORAL at 11:46

## 2024-08-11 RX ADMIN — MIRTAZAPINE 7.5 MG: 15 TABLET, FILM COATED ORAL at 20:20

## 2024-08-11 RX ADMIN — BUSPIRONE HYDROCHLORIDE 5 MG: 5 TABLET ORAL at 12:29

## 2024-08-11 RX ADMIN — SODIUM CHLORIDE 100 MG: 9 INJECTION, SOLUTION INTRAVENOUS at 16:38

## 2024-08-11 RX ADMIN — METHOCARBAMOL TABLETS 750 MG: 750 TABLET, COATED ORAL at 20:20

## 2024-08-11 RX ADMIN — Medication 100 MG: at 09:52

## 2024-08-11 RX ADMIN — PHYTONADIONE 10 MG: 5 TABLET ORAL at 12:29

## 2024-08-11 RX ADMIN — BUSPIRONE HYDROCHLORIDE 5 MG: 5 TABLET ORAL at 09:52

## 2024-08-11 RX ADMIN — CEPHALEXIN 500 MG: 500 CAPSULE ORAL at 20:20

## 2024-08-11 RX ADMIN — Medication 1000 UNITS: at 09:51

## 2024-08-11 RX ADMIN — SODIUM CHLORIDE, PRESERVATIVE FREE 10 ML: 5 INJECTION INTRAVENOUS at 09:52

## 2024-08-11 RX ADMIN — POTASSIUM CHLORIDE 40 MEQ: 1500 TABLET, EXTENDED RELEASE ORAL at 11:46

## 2024-08-11 RX ADMIN — WATER 2000 MG: 1 INJECTION INTRAMUSCULAR; INTRAVENOUS; SUBCUTANEOUS at 03:15

## 2024-08-11 RX ADMIN — OXYCODONE HYDROCHLORIDE 10 MG: 5 TABLET ORAL at 11:45

## 2024-08-11 RX ADMIN — POTASSIUM CHLORIDE 40 MEQ: 1500 TABLET, EXTENDED RELEASE ORAL at 09:51

## 2024-08-11 RX ADMIN — METHOCARBAMOL TABLETS 750 MG: 750 TABLET, COATED ORAL at 12:29

## 2024-08-11 RX ADMIN — PANTOPRAZOLE SODIUM 40 MG: 40 TABLET, DELAYED RELEASE ORAL at 05:20

## 2024-08-11 RX ADMIN — ISOSORBIDE DINITRATE 10 MG: 10 TABLET ORAL at 09:51

## 2024-08-11 RX ADMIN — METHOCARBAMOL TABLETS 750 MG: 750 TABLET, COATED ORAL at 09:51

## 2024-08-11 RX ADMIN — MIDODRINE HYDROCHLORIDE 5 MG: 5 TABLET ORAL at 09:52

## 2024-08-11 ASSESSMENT — PAIN DESCRIPTION - DESCRIPTORS: DESCRIPTORS: ACHING;DISCOMFORT

## 2024-08-11 ASSESSMENT — PAIN SCALES - WONG BAKER: WONGBAKER_NUMERICALRESPONSE: HURTS A LITTLE BIT

## 2024-08-11 ASSESSMENT — PAIN - FUNCTIONAL ASSESSMENT: PAIN_FUNCTIONAL_ASSESSMENT: ACTIVITIES ARE NOT PREVENTED

## 2024-08-11 ASSESSMENT — PAIN SCALES - GENERAL
PAINLEVEL_OUTOF10: 0
PAINLEVEL_OUTOF10: 8

## 2024-08-11 ASSESSMENT — PAIN DESCRIPTION - LOCATION: LOCATION: CHEST

## 2024-08-11 ASSESSMENT — PAIN DESCRIPTION - ORIENTATION: ORIENTATION: ANTERIOR

## 2024-08-11 NOTE — CONSULTS
CONSULT  Federico Riddle M.D.  [facility]  Dr. Tejinder Subramanian M.D.,  Dr. Roly Pinzon M.D.,  SHAHRZAD Lamb.CHERYLE.,  Dr. Min Mcdaniels D.O. ,  Dr. Lance Lassiter M.D.,          Len Tomass  59 y.o.  male      Re: \"cirrhosis worsening inr\"  Requesting physician:Dr Preston  Date:12:06 PM 8/11/2024          HPI: 59-year-old male patient seen in the hospital for above described issue.  Patient has established diagnosis of cirrhosis and was most recently seen in the hospital in May of this year when he underwent upper endoscopy revealing no varices with finding of portal hypertensive gastropathy and gastritis/duodenitis.  Patient most recent colonoscopy was in 2022 with recommendation for repeat colonoscopy in 3 years.  Patient has additional medical history of aortic valve replacement, mitral valve replacement, CVA, testicular cancer with history of chemotherapy, coronary artery disease with CABG.  Patient is on oral anticoagulation with Coumadin.  Most recent dose of Coumadin has been given according to MAR on 8 August (2 mg).    On this admission patient has been admitted since presenting to the hospital on 2 August with chief complaint of shortness of breath.  As above stated most recent dose of Coumadin was given on 8 August with subsequently patient receiving vitamin K supplementation.  INR however remains elevated above 10 with INR of 9.5 yesterday.  Patient himself denies any significant abdominal pain.  He complains of left-sided chest pain.  He denies hematemesis or emesis of coffee-ground material and denies nausea vomiting.  Patient denies blood in the stool or melena.  Additional laboratory work reveals unremarkable liver profile today with nonelevated transaminases, nonelevated alkaline phosphatase and nonelevated bilirubin.  Chemistry panel shows renal failure with BUN of 83 and creatinine of 5.1 which appears to be worsening since admission.  Also electrolyte abnormalities.  CBC shows  10 mg at 08/05/24 1338    isosorbide dinitrate (ISORDIL) tablet 10 mg  10 mg Oral TID Rico Pelaez MD   10 mg at 08/11/24 0951    midodrine (PROAMATINE) tablet 5 mg  5 mg Oral TID WC Rico Pelaez MD   5 mg at 08/11/24 1146    mirtazapine (REMERON) tablet 7.5 mg  7.5 mg Oral Nightly Rico Pelaez MD   7.5 mg at 08/10/24 1956    nicotine (NICODERM CQ) 21 MG/24HR 1 patch  1 patch TransDERmal Daily Rico Pelaez MD   1 patch at 08/08/24 0843    thiamine tablet 100 mg  100 mg Oral Daily Rico Pelaez MD   100 mg at 08/11/24 0952    rifAXIMin (XIFAXAN) tablet 550 mg  550 mg Oral BID Rico Pelaez MD   550 mg at 08/11/24 0951    sodium chloride flush 0.9 % injection 5-40 mL  5-40 mL IntraVENous 2 times per day Rico Pelaez MD   10 mL at 08/11/24 0952    sodium chloride flush 0.9 % injection 5-40 mL  5-40 mL IntraVENous PRN Rico Pelaez MD   10 mL at 08/09/24 1136    0.9 % sodium chloride infusion   IntraVENous PRN Rico Pelaez MD        polyethylene glycol (GLYCOLAX) packet 17 g  17 g Oral Daily PRN Rico Pelaez MD        prochlorperazine (COMPAZINE) tablet 10 mg  10 mg Oral Q8H PRRico Dominique MD        Or    prochlorperazine (COMPAZINE) injection 10 mg  10 mg IntraVENous Q6H PRRico Dominique MD            SocHx:  Social History     Socioeconomic History    Marital status:      Spouse name: Not on file    Number of children: Not on file    Years of education: Not on file    Highest education level: Not on file   Occupational History    Not on file   Tobacco Use    Smoking status: Every Day     Current packs/day: 1.00     Average packs/day: 1 pack/day for 35.0 years (35.0 ttl pk-yrs)     Types: Cigarettes    Smokeless tobacco: Never   Substance and Sexual Activity    Alcohol use: Yes     Comment: 3-4 beers/day.    Drug use: Never    Sexual activity: Not on file   Other Topics Concern    Not on file   Social History Narrative    Not on file     Social Determinants of Health     Financial  07:30 AM    MPV Can not be calculated 08/11/2024 03:10 AM     Lab Results   Component Value Date/Time     08/11/2024 03:10 AM    K 3.1 08/11/2024 03:10 AM    K 3.9 09/15/2022 03:08 PM    CL 94 08/11/2024 03:10 AM    CO2 20 08/11/2024 03:10 AM    BUN 83 08/11/2024 03:10 AM    CREATININE 5.1 08/11/2024 03:10 AM    CALCIUM 8.9 08/11/2024 03:10 AM    BILITOT 0.5 08/11/2024 03:10 AM    ALKPHOS 116 08/11/2024 03:10 AM    AST 22 08/11/2024 03:10 AM    ALT <5 08/11/2024 03:10 AM     Lab Results   Component Value Date/Time    LIPASE 32 08/02/2024 12:35 PM     No results found for: \"AMYLASE\"      ASSESSMENT/PLAN:  Patient Active Problem List   Diagnosis    Chest pain    Closed fracture of nasal bones    Injury of face    Retroperitoneal hematoma    Vitamin D deficiency    Acute chest pain    Acute cholecystitis    Gastrointestinal hemorrhage    Polyp of colon    Right upper quadrant abdominal pain    Diarrhea    Current moderate episode of major depressive disorder without prior episode (HCC)    Hypertension    H/O mitral valve replacement with mechanical valve    H/O mechanical aortic valve replacement    Coronary artery disease involving native coronary artery of native heart without angina pectoris    Paravalvular leak (prosthetic valve)    Hx of CABG    Warfarin anticoagulation    Supratherapeutic INR    Moderate obesity    Dyspnea    Alcohol withdrawal syndrome without complication (HCC)    DTs (delirium tremens) ([facility])    Anemia    Severe protein-calorie malnutrition (HCC)    Acute decompensated heart failure (HCC)    Aortic valve disease    Mitral valve disease    AV block    Pure hypercholesterolemia    Chronic obstructive pulmonary disease (HCC)    Loculated pleural effusion    Troponin level elevated    Stage 2 chronic kidney disease    Alcohol use    Breast swelling    Pleural effusion    Hypophosphatemia    Acute congestive heart failure (HCC)    FRANTZ (acute kidney injury) ([facility])    Acidosis    Hyperglycemia

## 2024-08-11 NOTE — PROGRESS NOTES
Pharmacy Consultation Note  (Warfarin Dosing and Monitoring)    Initial consult date: 8/2  Consulting Provider: Latanya Dorsey is a 59 y.o. male for whom pharmacy has been asked to manage warfarin therapy.     Weight:   Wt Readings from Last 1 Encounters:   08/11/24 92.7 kg (204 lb 5.9 oz)       TSH:    Lab Results   Component Value Date/Time    TSH 0.95 05/08/2024 02:24 AM       Hepatic Function Panel:                            Lab Results   Component Value Date/Time    ALKPHOS 116 08/11/2024 03:10 AM    ALT <5 08/11/2024 03:10 AM    AST 22 08/11/2024 03:10 AM    BILITOT 0.5 08/11/2024 03:10 AM    BILIDIR 0.3 08/11/2024 03:10 AM    IBILI 0.2 08/11/2024 03:10 AM         Recent Labs     08/09/24  0418 08/10/24  0415 08/11/24  0310   HGB 10.0* 9.1* 7.9*     Date Warfarin Dose INR Heparin or LMWH Comment   8/3 NO DOSE 5 Lovenox DC'd    8/4 NO DOSE 4 --    8/5 4 mg 3.1 --    8/6 6 mg 2.6 --    8/7 6 mg 2.6 --    8/8 2 mg 3.7 --    8/9 NO DOSE 6.0 x    8/10 NO DOSE 9.5 x Vit K 5mg x2 PO   8/11 NO DOSE >10  x Vitamin K 5 mg po x 1 (0520); Vitamin K 10 mg po x 1 (1229)                   Assessment:  Patient is a 59 y.o. male on warfarin for Atrial Fibrillation, Mechanical Heart Valve (mitral), and Mechanical Heart Valve (atrial).  Patient's home warfarin dosing regimen is 4mg daily per med rec but patient filled 3mg tabs most recently.   INR possibly elevated in setting of liver dysfunction.  Goal INR 2.5 - 3.5  INR > 10, vitamin K 5 mg and 10 mg given on 8/11.    Plan:  No warfarin tonight.  Daily PT/INR until the INR is stable within the therapeutic range.  Pharmacist will follow and monitor/adjust dosing as necessary.      Armana Antunez RPh 8/11/2024 12:49 PM

## 2024-08-11 NOTE — PROGRESS NOTES
Department of Internal Medicine  Nephrology Progress Note    Events reviewed.    SUBJECTIVE:   We are following for FRANTZ and fluid overload. Patient reports feeling nauseated.    PHYSICAL EXAM:      Vitals:    VITALS:  /67   Pulse 72   Temp 98 °F (36.7 °C) (Oral)   Resp 18   Ht 1.778 m (5' 10\")   Wt 92.7 kg (204 lb 5.9 oz)   SpO2 95%   BMI 29.32 kg/m²   24HR BLOOD PRESSURE RANGE:  Systolic (24hrs), Av , Min:92 , Max:105   ; Diastolic (24hrs), Av, Min:51, Max:67    24HR INTAKE/OUTPUT:    Intake/Output Summary (Last 24 hours) at 2024 0903  Last data filed at 2024 0004  Gross per 24 hour   Intake --   Output 1300 ml   Net -1300 ml       Constitutional:  awake NAD  HEENT:  EOMI  Respiratory:  CTA  Cardiovascular/Edema:  S1 S2  Gastrointestinal:  +BS  Neurologic:  no deficits  Skin:  warm dry  Other:  BLE ++edema     DATA:    CBC with Differential:    Lab Results   Component Value Date/Time    WBC 7.0 2024 03:10 AM    RBC 3.32 2024 03:10 AM    HGB 7.9 2024 03:10 AM    HCT 27.2 2024 03:10 AM     2024 07:30 AM    MCV 81.9 2024 03:10 AM    MCH 23.8 2024 03:10 AM    MCHC 29.0 2024 03:10 AM    RDW 22.2 2024 03:10 AM    NRBC 1 2024 05:52 AM    METASPCT DUPLICATE 2023 04:12 AM    LYMPHOPCT 9 2024 03:10 AM    PROMYELOPCT DUPLICATE 2023 04:12 AM    MONOPCT 13 2024 03:10 AM    MYELOPCT DUPLICATE 2023 04:12 AM    EOSPCT 5 2024 03:10 AM    BASOPCT 3 2024 03:10 AM    MONOSABS 0.91 2024 03:10 AM    LYMPHSABS 0.61 2024 03:10 AM    EOSABS 0.37 2024 03:10 AM    BASOSABS 0.18 2024 03:10 AM     CMP:    Lab Results   Component Value Date/Time     2024 03:10 AM    K 3.1 2024 03:10 AM    K 3.9 09/15/2022 03:08 PM    CL 94 2024 03:10 AM    CO2 20 2024 03:10 AM    BUN 83 2024 03:10 AM    CREATININE 5.1 2024 03:10 AM    GFRAA >60 09/15/2022

## 2024-08-11 NOTE — PROGRESS NOTES
Infectious Disease  Progress Note  [facility]    Chief Complaint: right leg redness.     Subjective: Patient resting in bed no nausea vomiting or diarrhea.  Took down the right leg Ace wrap looks like the erythema has resolved.  Patient feels better.  Still wants questions answered I answered the ID questions    Scheduled Meds:   ceFAZolin  2,000 mg IntraVENous Q12H    [Held by provider] bumetanide  2 mg Oral BID    tamsulosin  0.4 mg Oral Nightly    [Held by provider] metOLazone  5 mg Oral Daily    warfarin placeholder: dosing by pharmacy   Other RX Placeholder    methocarbamol  750 mg Oral 4x Daily    aspirin  81 mg Oral Daily    atorvastatin  20 mg Oral Nightly    metoprolol succinate  25 mg Oral BID    Vitamin D  1,000 Units Oral Daily    pantoprazole  40 mg Oral BID AC    lactulose  20 g Oral Daily    busPIRone  5 mg Oral TID    folic acid  1 mg Oral Daily    [Held by provider] hydrALAZINE  10 mg Oral 3 times per day    isosorbide dinitrate  10 mg Oral TID    midodrine  5 mg Oral TID WC    mirtazapine  7.5 mg Oral Nightly    nicotine  1 patch TransDERmal Daily    thiamine  100 mg Oral Daily    rifAXIMin  550 mg Oral BID    sodium chloride flush  5-40 mL IntraVENous 2 times per day     Continuous Infusions:   lactated ringers IV soln 75 mL/hr at 08/10/24 1245    sodium chloride      sodium chloride       PRN Meds:oxyCODONE, oxyCODONE, sodium chloride, sodium chloride flush, sodium chloride, polyethylene glycol, prochlorperazine **OR** prochlorperazine    Prior to Admission medications    Medication Sig Start Date End Date Taking? Authorizing Provider   warfarin (COUMADIN) 3 MG tablet Take 1 tablet by mouth daily  Patient taking differently: Take 4 mg by mouth daily 5/19/24   Uzma Burgos MD   busPIRone (BUSPAR) 5 MG tablet Take 1 tablet by mouth 3 times daily 5/19/24   Uzma Burgos MD   folic acid (FOLVITE) 1 MG tablet Take 1 tablet by mouth daily 5/20/24   Uzma Burgos MD   hydrALAZINE  pink/moist, No thrush  Neck: Neck supple  Heart- Regular, Rate, Rhythm- no murmur appreciated.  Lungs- clear to ascultation. Respirations even and nonlabored.  Abdomen- Soft, bowel sounds positive, non tender  Musculo/Extremities-  right leg redness is significantly better.  Minimal swelling.  Ace wrap removed.  Neurological- No focal motor or sensory loss.  No confusion  Skin:  Warm and dry, free from rashes.  Left sided chest swelling.  Lines: PIV    Labs, Cultures reviewed  Radiology and other consultants notes reviewed    Microbiology:  Blood cultures 8/7 in process    Assessment:  Right leg cellulitis:  Left breast swelling: needs to be worked up outpatient.  FRANTZ on CKD     Plan:    Continue IV cefazolin 2 g every 8-----Creatinine did go from 3 up to 4.5 to 5.  I did change the cefazolin to every 12 hours yesterday----I am going to switch him to Keflex 500 mg twice daily today    Ultrasound left breast-was informed that this cannot be done at [facility].  Will need to be followed up outpatient with primary care physician.  Ace wrap and elevate right foot when in bed.   Monitor labs--follow K, INR, Creat  Culver catheter already placed monitor input and output  IVF  Renal input   Will follow with you--      Electronically signed by ARNALDO Horne on 8/11/2024 at 9:03 AM

## 2024-08-11 NOTE — PROGRESS NOTES
[facility] Cardiology Inpatient Progress Note    Patient is a 59 y.o. male of Jama Skinner PA seen in hospital follow up.     Chief complaint: CHF    HPI: Some SOB. No CP.     Patient Active Problem List   Diagnosis    Chest pain    Closed fracture of nasal bones    Injury of face    Retroperitoneal hematoma    Vitamin D deficiency    Acute chest pain    Acute cholecystitis    Gastrointestinal hemorrhage    Polyp of colon    Right upper quadrant abdominal pain    Diarrhea    Current moderate episode of major depressive disorder without prior episode (HCC)    Hypertension    H/O mitral valve replacement with mechanical valve    H/O mechanical aortic valve replacement    Coronary artery disease involving native coronary artery of native heart without angina pectoris    Paravalvular leak (prosthetic valve)    Hx of CABG    Warfarin anticoagulation    Supratherapeutic INR    Moderate obesity    Dyspnea    Alcohol withdrawal syndrome without complication (HCC)    DTs (delirium tremens) (HCC)    Anemia    Severe protein-calorie malnutrition (HCC)    Acute decompensated heart failure (HCC)    Aortic valve disease    Mitral valve disease    AV block    Pure hypercholesterolemia    Chronic obstructive pulmonary disease (HCC)    Loculated pleural effusion    Troponin level elevated    Stage 2 chronic kidney disease    Alcohol use    Breast swelling    Pleural effusion    Hypophosphatemia    Acute congestive heart failure (HCC)    FRANTZ (acute kidney injury) (HCC)    Acidosis    Hyperglycemia    Hyperlipidemia    Alcoholic liver disease, unspecified (HCC)    Thrombocytopenia (HCC)    Delirium    Acute blood loss anemia    Heart failure with mid-range ejection fraction (HFmEF) (HCC)    Acute on chronic combined systolic and diastolic congestive heart failure (HCC)    Normally functioning cardiac pacemaker present    Hepatic cirrhosis (HCC)    Acute on chronic systolic (congestive) heart failure (HCC)       No Known  Renal managing.     6. Anemia: Follow labs.     7. HTN: Observe.     8. Lipids: Statin.     9. Left-sided tender gynecomastia etiology unclear: Per primary service.     10. COPD: Per primary service.     11. Tobacco abuse    12. Alcoholic liver disease    13. Obesity     14. Noncompliance/social/financial/transportation issues    Available external charts reviewed.   Available imaging and evaluations independently reviewed.   Interviewed and discussed patient with available family.  Discussed case with referring service and non-cardiology consultants.     Greater than 50 minutes was spent counseling the patient, reviewing the rationale for the above recommendations and reviewing the patient's current medication list, problem list and results of all previously ordered testing.    Melba Mcmanus D.O.  Cardiologist  Cardiology, [facility]

## 2024-08-11 NOTE — PROGRESS NOTES
[facility] Hospitalist   Progress Note    Admitting Date and Time: 8/2/2024 12:11 PM  Admit Dx: Anemia [D64.9]  Anemia, unspecified type [D64.9]    Subjective:    Patient was admitted with Anemia [D64.9]  Anemia, unspecified type [D64.9]. Patient denies fever, chills, cp, sob, n/v.     cephALEXin  500 mg Oral 2 times per day    phytonadione  10 mg Oral Once    [Held by provider] bumetanide  2 mg Oral BID    tamsulosin  0.4 mg Oral Nightly    [Held by provider] metOLazone  5 mg Oral Daily    warfarin placeholder: dosing by pharmacy   Other RX Placeholder    methocarbamol  750 mg Oral 4x Daily    aspirin  81 mg Oral Daily    atorvastatin  20 mg Oral Nightly    metoprolol succinate  25 mg Oral BID    Vitamin D  1,000 Units Oral Daily    pantoprazole  40 mg Oral BID AC    lactulose  20 g Oral Daily    busPIRone  5 mg Oral TID    folic acid  1 mg Oral Daily    [Held by provider] hydrALAZINE  10 mg Oral 3 times per day    isosorbide dinitrate  10 mg Oral TID    midodrine  5 mg Oral TID WC    mirtazapine  7.5 mg Oral Nightly    nicotine  1 patch TransDERmal Daily    thiamine  100 mg Oral Daily    rifAXIMin  550 mg Oral BID    sodium chloride flush  5-40 mL IntraVENous 2 times per day     oxyCODONE, 5 mg, Q6H PRN  oxyCODONE, 10 mg, Q6H PRN  sodium chloride, , PRN  sodium chloride flush, 5-40 mL, PRN  sodium chloride, , PRN  polyethylene glycol, 17 g, Daily PRN  prochlorperazine, 10 mg, Q8H PRN   Or  prochlorperazine, 10 mg, Q6H PRN         Objective:    BP (!) 89/54   Pulse 82   Temp 98.4 °F (36.9 °C) (Oral)   Resp 18   Ht 1.778 m (5' 10\")   Wt 92.7 kg (204 lb 5.9 oz)   SpO2 95%   BMI 29.32 kg/m²   Skin: warm and dry, no rash or erythema  Pulmonary/Chest: clear to auscultation bilaterally- no wheezes, rales or rhonchi, normal air movement, no respiratory distress  Cardiovascular: rhythm reg at rate of 84  Abdomen: soft, non-tender, non-distended, normal bowel sounds, no masses or  organomegaly  Extremities: no cyanosis, no clubbing, and no edema      Recent Labs     08/10/24  0415 08/10/24  2215 08/11/24  0310    135 136   K 3.1* 3.4* 3.1*   CL 93* 93* 94*   CO2 22 19* 20*   BUN 74* 84* 83*   CREATININE 4.5* 5.2* 5.1*   GLUCOSE 105* 111* 96   CALCIUM 8.7 8.9 8.9       Recent Labs     08/09/24  0418 08/10/24  0415 08/11/24  0310   WBC 10.9 9.5 7.0   RBC 4.20 3.84 3.32*   HGB 10.0* 9.1* 7.9*   HCT 34.6* 31.7* 27.2*   MCV 82.4 82.6 81.9   MCH 23.8* 23.7* 23.8*   MCHC 28.9* 28.7* 29.0*   RDW 22.5* 22.7* 22.2*   MPV 11.7 11.8 Can not be calculated       CBC with Differential:    Lab Results   Component Value Date/Time    WBC 7.0 08/11/2024 03:10 AM    RBC 3.32 08/11/2024 03:10 AM    HGB 7.9 08/11/2024 03:10 AM    HCT 27.2 08/11/2024 03:10 AM     08/05/2024 07:30 AM    MCV 81.9 08/11/2024 03:10 AM    MCH 23.8 08/11/2024 03:10 AM    MCHC 29.0 08/11/2024 03:10 AM    RDW 22.2 08/11/2024 03:10 AM    NRBC 1 08/07/2024 05:52 AM    METASPCT DUPLICATE 07/18/2023 04:12 AM    LYMPHOPCT 9 08/11/2024 03:10 AM    PROMYELOPCT DUPLICATE 07/18/2023 04:12 AM    MONOPCT 13 08/11/2024 03:10 AM    MYELOPCT DUPLICATE 07/18/2023 04:12 AM    EOSPCT 5 08/11/2024 03:10 AM    BASOPCT 3 08/11/2024 03:10 AM    MONOSABS 0.91 08/11/2024 03:10 AM    LYMPHSABS 0.61 08/11/2024 03:10 AM    EOSABS 0.37 08/11/2024 03:10 AM    BASOSABS 0.18 08/11/2024 03:10 AM     BMP:    Lab Results   Component Value Date/Time     08/11/2024 03:10 AM    K 3.1 08/11/2024 03:10 AM    K 3.9 09/15/2022 03:08 PM    CL 94 08/11/2024 03:10 AM    CO2 20 08/11/2024 03:10 AM    BUN 83 08/11/2024 03:10 AM    CREATININE 5.1 08/11/2024 03:10 AM    CALCIUM 8.9 08/11/2024 03:10 AM    GFRAA >60 09/15/2022 03:08 PM    LABGLOM 12 08/11/2024 03:10 AM    LABGLOM 31 03/05/2024 05:57 AM    GLUCOSE 96 08/11/2024 03:10 AM     Hepatic Function Panel:    Lab Results   Component Value Date/Time    ALKPHOS 116 08/11/2024 03:10 AM    ALT <5 08/11/2024 03:10 AM     AST 22 08/11/2024 03:10 AM    BILITOT 0.5 08/11/2024 03:10 AM    BILIDIR 0.3 08/11/2024 03:10 AM    IBILI 0.2 08/11/2024 03:10 AM     Magnesium:    Lab Results   Component Value Date/Time    MG 1.8 08/11/2024 03:10 AM     Phosphorus:    Lab Results   Component Value Date/Time    PHOS 7.7 08/11/2024 03:10 AM        Radiology:   Vascular duplex lower extremity venous right   Final Result   No evidence of DVT in the right lower extremity.         XR CHEST PORTABLE   Final Result   Cardiomegaly with underlying chronic interstitial changes seen in the lung   fields. No new focal parenchymal opacification present.         US BREAST LIMITED LEFT    (Results Pending)       Assessment:    Principal Problem:    Anemia  Active Problems:    H/O mitral valve replacement with mechanical valve    H/O mechanical aortic valve replacement    Supratherapeutic INR    AV block    Acute blood loss anemia    Heart failure with mid-range ejection fraction (HFmEF) (HCC)    Acute on chronic combined systolic and diastolic congestive heart failure (HCC)    Normally functioning cardiac pacemaker present    Hepatic cirrhosis (HCC)    Acute on chronic systolic (congestive) heart failure (HCC)  Resolved Problems:    * No resolved hospital problems. *      Plan:  Acute on chronic systolic chf pt off bumex gtt continue medication  cardio following  Acidosis monitor  Varinder  bumex gtt stopped   renal following  Hypomagnesemia monitor and replace prn  Anemia monitor s/p transfusion hematology on consult.  Cirrhosis with hepatic encephalopathy likely due to alcohol abuse  continue lactulose/rifaximin  continue thiaimine.   Hyperlipidemia continue statin  Htn continue med  RLE cellulitis continue abx  NSVT pt had 19 beats of vtach per nursing. Replace K and monitor mag and replace prn. Cardio on consult  Hypokalemia monitor and replace prn  Elevated inr pt given vit k continue to monitor. Pt not actively bleeding  Thrombocytopenia monitor    Elevated

## 2024-08-12 LAB
ANION GAP SERPL CALCULATED.3IONS-SCNC: 16 MMOL/L (ref 7–16)
BASOPHILS # BLD: 0.04 K/UL (ref 0–0.2)
BASOPHILS NFR BLD: 1 % (ref 0–2)
BUN SERPL-MCNC: 91 MG/DL (ref 6–20)
CALCIUM SERPL-MCNC: 9.4 MG/DL (ref 8.6–10.2)
CHLORIDE SERPL-SCNC: 98 MMOL/L (ref 98–107)
CO2 SERPL-SCNC: 21 MMOL/L (ref 22–29)
CREAT SERPL-MCNC: 4.6 MG/DL (ref 0.7–1.2)
EOSINOPHIL # BLD: 0.35 K/UL (ref 0.05–0.5)
EOSINOPHILS RELATIVE PERCENT: 7 % (ref 0–6)
ERYTHROCYTE [DISTWIDTH] IN BLOOD BY AUTOMATED COUNT: 22.3 % (ref 11.5–15)
GFR, ESTIMATED: 14 ML/MIN/1.73M2
GLUCOSE SERPL-MCNC: 94 MG/DL (ref 74–99)
HCT VFR BLD AUTO: 27.1 % (ref 37–54)
HGB BLD-MCNC: 8 G/DL (ref 12.5–16.5)
IMM GRANULOCYTES # BLD AUTO: <0.03 K/UL (ref 0–0.58)
IMM GRANULOCYTES NFR BLD: 0 % (ref 0–5)
INR PPP: 6.1
LYMPHOCYTES NFR BLD: 0.77 K/UL (ref 1.5–4)
LYMPHOCYTES RELATIVE PERCENT: 14 % (ref 20–42)
MCH RBC QN AUTO: 24 PG (ref 26–35)
MCHC RBC AUTO-ENTMCNC: 29.5 G/DL (ref 32–34.5)
MCV RBC AUTO: 81.4 FL (ref 80–99.9)
MICROORGANISM SPEC CULT: NORMAL
MICROORGANISM SPEC CULT: NORMAL
MONOCYTES NFR BLD: 0.92 K/UL (ref 0.1–0.95)
MONOCYTES NFR BLD: 17 % (ref 2–12)
NEUTROPHILS NFR BLD: 61 % (ref 43–80)
NEUTS SEG NFR BLD: 3.24 K/UL (ref 1.8–7.3)
PLATELET, FLUORESCENCE: 103 K/UL (ref 130–450)
PMV BLD AUTO: ABNORMAL FL (ref 7–12)
POTASSIUM SERPL-SCNC: 3.4 MMOL/L (ref 3.5–5)
PROTHROMBIN TIME: 66.5 SEC (ref 9.3–12.4)
RBC # BLD AUTO: 3.33 M/UL (ref 3.8–5.8)
RBC # BLD: ABNORMAL 10*6/UL
SERVICE CMNT-IMP: NORMAL
SERVICE CMNT-IMP: NORMAL
SODIUM SERPL-SCNC: 135 MMOL/L (ref 132–146)
SPECIMEN DESCRIPTION: NORMAL
SPECIMEN DESCRIPTION: NORMAL
WBC OTHER # BLD: 5.3 K/UL (ref 4.5–11.5)

## 2024-08-12 PROCEDURE — 99233 SBSQ HOSP IP/OBS HIGH 50: CPT | Performed by: INTERNAL MEDICINE

## 2024-08-12 PROCEDURE — 6370000000 HC RX 637 (ALT 250 FOR IP): Performed by: INTERNAL MEDICINE

## 2024-08-12 PROCEDURE — 85610 PROTHROMBIN TIME: CPT

## 2024-08-12 PROCEDURE — 6370000000 HC RX 637 (ALT 250 FOR IP): Performed by: NURSE PRACTITIONER

## 2024-08-12 PROCEDURE — 80048 BASIC METABOLIC PNL TOTAL CA: CPT

## 2024-08-12 PROCEDURE — 6360000002 HC RX W HCPCS: Performed by: HOSPITALIST

## 2024-08-12 PROCEDURE — 2580000003 HC RX 258: Performed by: HOSPITALIST

## 2024-08-12 PROCEDURE — 85025 COMPLETE CBC W/AUTO DIFF WBC: CPT

## 2024-08-12 PROCEDURE — 6370000000 HC RX 637 (ALT 250 FOR IP): Performed by: CLINICAL NURSE SPECIALIST

## 2024-08-12 PROCEDURE — 2060000000 HC ICU INTERMEDIATE R&B

## 2024-08-12 PROCEDURE — 99232 SBSQ HOSP IP/OBS MODERATE 35: CPT | Performed by: INTERNAL MEDICINE

## 2024-08-12 PROCEDURE — 2580000003 HC RX 258: Performed by: INTERNAL MEDICINE

## 2024-08-12 RX ORDER — PHYTONADIONE 5 MG/1
5 TABLET ORAL ONCE
Status: COMPLETED | OUTPATIENT
Start: 2024-08-12 | End: 2024-08-12

## 2024-08-12 RX ADMIN — FOLIC ACID 1 MG: 1 TABLET ORAL at 08:17

## 2024-08-12 RX ADMIN — PANTOPRAZOLE SODIUM 40 MG: 40 TABLET, DELAYED RELEASE ORAL at 06:36

## 2024-08-12 RX ADMIN — MIDODRINE HYDROCHLORIDE 5 MG: 5 TABLET ORAL at 08:17

## 2024-08-12 RX ADMIN — ISOSORBIDE DINITRATE 10 MG: 10 TABLET ORAL at 14:05

## 2024-08-12 RX ADMIN — ATORVASTATIN CALCIUM 20 MG: 20 TABLET, FILM COATED ORAL at 20:51

## 2024-08-12 RX ADMIN — RIFAXIMIN 550 MG: 550 TABLET ORAL at 20:53

## 2024-08-12 RX ADMIN — ISOSORBIDE DINITRATE 10 MG: 10 TABLET ORAL at 20:52

## 2024-08-12 RX ADMIN — PANTOPRAZOLE SODIUM 40 MG: 40 TABLET, DELAYED RELEASE ORAL at 16:35

## 2024-08-12 RX ADMIN — SODIUM CHLORIDE 125 MG: 900 INJECTION INTRAVENOUS at 14:04

## 2024-08-12 RX ADMIN — METHOCARBAMOL TABLETS 750 MG: 750 TABLET, COATED ORAL at 13:03

## 2024-08-12 RX ADMIN — MIDODRINE HYDROCHLORIDE 5 MG: 5 TABLET ORAL at 16:35

## 2024-08-12 RX ADMIN — CEPHALEXIN 500 MG: 500 CAPSULE ORAL at 20:52

## 2024-08-12 RX ADMIN — METHOCARBAMOL TABLETS 750 MG: 750 TABLET, COATED ORAL at 16:35

## 2024-08-12 RX ADMIN — BUSPIRONE HYDROCHLORIDE 5 MG: 5 TABLET ORAL at 08:18

## 2024-08-12 RX ADMIN — METHOCARBAMOL TABLETS 750 MG: 750 TABLET, COATED ORAL at 08:18

## 2024-08-12 RX ADMIN — Medication 100 MG: at 08:18

## 2024-08-12 RX ADMIN — ASPIRIN 81 MG CHEWABLE TABLET 81 MG: 81 TABLET CHEWABLE at 08:18

## 2024-08-12 RX ADMIN — BUSPIRONE HYDROCHLORIDE 5 MG: 5 TABLET ORAL at 20:52

## 2024-08-12 RX ADMIN — PHYTONADIONE 5 MG: 5 TABLET ORAL at 06:36

## 2024-08-12 RX ADMIN — OXYCODONE HYDROCHLORIDE 5 MG: 5 TABLET ORAL at 08:37

## 2024-08-12 RX ADMIN — MIRTAZAPINE 7.5 MG: 15 TABLET, FILM COATED ORAL at 20:53

## 2024-08-12 RX ADMIN — SODIUM CHLORIDE, PRESERVATIVE FREE 10 ML: 5 INJECTION INTRAVENOUS at 20:53

## 2024-08-12 RX ADMIN — CEPHALEXIN 500 MG: 500 CAPSULE ORAL at 08:18

## 2024-08-12 RX ADMIN — METHOCARBAMOL TABLETS 750 MG: 750 TABLET, COATED ORAL at 20:52

## 2024-08-12 RX ADMIN — Medication 1000 UNITS: at 08:17

## 2024-08-12 RX ADMIN — SODIUM CHLORIDE, PRESERVATIVE FREE 10 ML: 5 INJECTION INTRAVENOUS at 08:22

## 2024-08-12 RX ADMIN — MIDODRINE HYDROCHLORIDE 5 MG: 5 TABLET ORAL at 13:02

## 2024-08-12 RX ADMIN — TAMSULOSIN HYDROCHLORIDE 0.4 MG: 0.4 CAPSULE ORAL at 20:53

## 2024-08-12 RX ADMIN — BUSPIRONE HYDROCHLORIDE 5 MG: 5 TABLET ORAL at 14:05

## 2024-08-12 ASSESSMENT — PAIN SCALES - GENERAL
PAINLEVEL_OUTOF10: 7
PAINLEVEL_OUTOF10: 0
PAINLEVEL_OUTOF10: 0

## 2024-08-12 NOTE — CARE COORDINATION
Met with patient and patients wife at bedside. ID, Hematology, Cardiology, Nephrology, GI, Urology are following. Currently on RA, PO keflex, IVF, IV ferrlecit. Has marks catheter for urinary retention. Patient denies home needs at this time. Will continue to follow.  Deja ELENAN, RN  Case Management

## 2024-08-12 NOTE — PROGRESS NOTES
Department of Internal Medicine  Nephrology Progress Note    Events reviewed.    SUBJECTIVE:   We are following for FRANTZ and fluid overload. Patient reports no new complaints.     PHYSICAL EXAM:      Vitals:    VITALS:  /74   Pulse 80   Temp 98.2 °F (36.8 °C) (Oral)   Resp 18   Ht 1.778 m (5' 10\")   Wt 92.7 kg (204 lb 5.9 oz)   SpO2 99%   BMI 29.32 kg/m²   24HR BLOOD PRESSURE RANGE:  Systolic (24hrs), Av , Min:83 , Max:129   ; Diastolic (24hrs), Av, Min:53, Max:82    24HR INTAKE/OUTPUT:    Intake/Output Summary (Last 24 hours) at 2024 1448  Last data filed at 2024 1148  Gross per 24 hour   Intake 1750 ml   Output 3850 ml   Net -2100 ml       Constitutional:  awake NAD  HEENT:  EOMI  Respiratory:  CTA  Cardiovascular/Edema:  S1 S2  Gastrointestinal:  +BS  Neurologic:  no deficits  Skin:  warm dry  Other:  BLE ++edema     DATA:    CBC with Differential:    Lab Results   Component Value Date/Time    WBC 5.3 2024 03:48 AM    RBC 3.33 2024 03:48 AM    HGB 8.0 2024 03:48 AM    HCT 27.1 2024 03:48 AM     2024 07:30 AM    MCV 81.4 2024 03:48 AM    MCH 24.0 2024 03:48 AM    MCHC 29.5 2024 03:48 AM    RDW 22.3 2024 03:48 AM    NRBC 1 2024 05:52 AM    METASPCT DUPLICATE 2023 04:12 AM    LYMPHOPCT 14 2024 03:48 AM    PROMYELOPCT DUPLICATE 2023 04:12 AM    MONOPCT 17 2024 03:48 AM    MYELOPCT DUPLICATE 2023 04:12 AM    EOSPCT 7 2024 03:48 AM    BASOPCT 1 2024 03:48 AM    MONOSABS 0.92 2024 03:48 AM    LYMPHSABS 0.77 2024 03:48 AM    EOSABS 0.35 2024 03:48 AM    BASOSABS 0.04 2024 03:48 AM     CMP:    Lab Results   Component Value Date/Time     2024 03:48 AM    K 3.4 2024 03:48 AM    K 3.9 09/15/2022 03:08 PM    CL 98 2024 03:48 AM    CO2 21 2024 03:48 AM    BUN 91 2024 03:48 AM    CREATININE 4.6 2024 03:48 AM    GFRAA >60  09/15/2022 03:08 PM    LABGLOM 14 08/12/2024 03:48 AM    LABGLOM 31 03/05/2024 05:57 AM    GLUCOSE 94 08/12/2024 03:48 AM    CALCIUM 9.4 08/12/2024 03:48 AM    BILITOT 0.5 08/11/2024 03:10 AM    ALKPHOS 116 08/11/2024 03:10 AM    AST 22 08/11/2024 03:10 AM    ALT <5 08/11/2024 03:10 AM     Magnesium:    Lab Results   Component Value Date/Time    MG 1.8 08/11/2024 03:10 AM     Phosphorus:    Lab Results   Component Value Date/Time    PHOS 7.7 08/11/2024 03:10 AM     Radiology Review:        XR CHEST PORTABLE 8/2/24    IMPRESSION:  Cardiomegaly with underlying chronic interstitial changes seen in the lung  fields. No new focal parenchymal opacification present.         BRIEF SUMMARY OF INITIAL CONSULT:    Briefly Mr. Dorsey is a 59-year-old man with history of CKD stage IIIa, with large proteinuria, probably due to nephrosclerosis, baseline creatinine 1.5-1.7 mg/dL, HTN, CAD status post CABG, HFpEF 55%, valvular heart disease status post AVR and MVR both mechanical valves, post pacemaker placement, hyperlipidemia, CVA, testicular cancer status postchemotherapy, alcohol abuse, fatty liver, who was admitted on 8/2/2024 for shortness of breath and bilateral lower extremity edema, his proBNP was 1489, chest x-ray showed cardiomegaly with underlying chronic interstitial changes. Creatinine on admission 1.8 mg/dL has increased to 2.4 mg/dL, reason for this consultation. Home medications, empagliflozin, metoprolol, cholecalciferol, atorvastatin.     IMPRESSION/RECOMMENDATIONS:       FRANTZ on CKD, most likely hemodynamically mediated 2/2 acute decompensated heart failure, cardiorenal syndrome, baseline creatinine 1.5-1.8 mg/dL.  Superimposed FRANTZ due to arrhythmia related hypotension, creatinine continues to worsen to 5.1 mg/dL however seems to have plateaued, continue Culver catheter per urology, continue to hold Bumex,     CKD stage IIIa, probably due to nephrosclerosis, baseline creatinine 1.5-1.8 mg/dL    Hypokalemia 2/2

## 2024-08-12 NOTE — PROGRESS NOTES
Infectious Disease  Progress Note  [facility]    Chief Complaint: right leg redness.     Subjective: Patient resting in bed no nausea vomiting or diarrhea. He is doing ok. Had 2 BM yesterday and no BM today yet,     Scheduled Meds:   cephALEXin  500 mg Oral 2 times per day    ferric gluconate (FERRLECIT) 125 mg in sodium chloride 0.9 % 100 mL IVPB  125 mg IntraVENous Once    [Held by provider] bumetanide  2 mg Oral BID    tamsulosin  0.4 mg Oral Nightly    [Held by provider] metOLazone  5 mg Oral Daily    warfarin placeholder: dosing by pharmacy   Other RX Placeholder    methocarbamol  750 mg Oral 4x Daily    aspirin  81 mg Oral Daily    atorvastatin  20 mg Oral Nightly    metoprolol succinate  25 mg Oral BID    Vitamin D  1,000 Units Oral Daily    pantoprazole  40 mg Oral BID AC    lactulose  20 g Oral Daily    busPIRone  5 mg Oral TID    folic acid  1 mg Oral Daily    [Held by provider] hydrALAZINE  10 mg Oral 3 times per day    isosorbide dinitrate  10 mg Oral TID    midodrine  5 mg Oral TID WC    mirtazapine  7.5 mg Oral Nightly    nicotine  1 patch TransDERmal Daily    thiamine  100 mg Oral Daily    rifAXIMin  550 mg Oral BID    sodium chloride flush  5-40 mL IntraVENous 2 times per day     Continuous Infusions:   lactated ringers IV soln 75 mL/hr at 08/10/24 1245    sodium chloride      sodium chloride       PRN Meds:oxyCODONE, oxyCODONE, sodium chloride, sodium chloride flush, sodium chloride, polyethylene glycol, prochlorperazine **OR** prochlorperazine    Prior to Admission medications    Medication Sig Start Date End Date Taking? Authorizing Provider   warfarin (COUMADIN) 3 MG tablet Take 1 tablet by mouth daily  Patient taking differently: Take 4 mg by mouth daily 5/19/24   Uzma Burgos MD   busPIRone (BUSPAR) 5 MG tablet Take 1 tablet by mouth 3 times daily 5/19/24   Uzma Bugros MD   folic acid (FOLVITE) 1 MG tablet Take 1 tablet by mouth daily 5/20/24   Uzma Burgos MD   hydrALAZINE  (APRESOLINE) 10 MG tablet Take 1 tablet by mouth every 8 hours 5/19/24   Uzma Burgos MD   isosorbide dinitrate (ISORDIL) 10 MG tablet Take 1 tablet by mouth 3 times daily 5/19/24   Uzma Burgos MD   midodrine (PROAMATINE) 5 MG tablet Take 1 tablet by mouth 3 times daily (with meals) 5/19/24   Uzma Burgos MD   mirtazapine (REMERON) 7.5 MG tablet Take 1 tablet by mouth nightly 5/19/24   Uzma Burgos MD   nicotine (NICODERM CQ) 21 MG/24HR Place 1 patch onto the skin daily 5/20/24   Uzma Burgos MD   thiamine 100 MG tablet Take 1 tablet by mouth daily 5/20/24   Uzma Burgos MD   pantoprazole (PROTONIX) 40 MG tablet Take 1 tablet by mouth 2 times daily (before meals) 5/10/24   Federico Riddle MD   rifAXIMin (XIFAXAN) 550 MG tablet Take 1 tablet by mouth 2 times daily 5/10/24   Federico Riddle MD   lactulose (CHRONULAC) 10 GM/15ML solution Take 30 mLs by mouth daily 5/11/24   Federico Riddle MD   aspirin 81 MG chewable tablet Take 1 tablet by mouth daily 3/6/24   Peter Olivo MD   empagliflozin (JARDIANCE) 10 MG tablet Take 1 tablet by mouth daily 3/6/24   Peter Olivo MD   atorvastatin (LIPITOR) 20 MG tablet Take 1 tablet by mouth nightly 3/5/24   Peter Olivo MD   metoprolol succinate (TOPROL XL) 25 MG extended release tablet Take 1 tablet by mouth 2 times daily 3/5/24   Peter Olivo MD   Vitamin D (CHOLECALCIFEROL) 25 MCG (1000 UT) TABS tablet Take 1 tablet by mouth daily 3/6/24   Peter Olivo MD        ROS:  As mentioned in subjective, all other systems negative      /74   Pulse 80   Temp 98.2 °F (36.8 °C) (Oral)   Resp 18   Ht 1.778 m (5' 10\")   Wt 92.7 kg (204 lb 5.9 oz)   SpO2 99%   BMI 29.32 kg/m²     Physical Exam  Const/Neuro- unchanged, no signs of acute distress, Alert--upset - said o one is telling him anything.  I answered appropriate questions  ENMT- Within Normal Limits, Normocephalic, mucous membranes

## 2024-08-12 NOTE — PROGRESS NOTES
INPATIENT CARDIOLOGY FOLLOW-UP    Name: Len Dorsey    Age: 59 y.o.    Date of Admission: 8/2/2024 12:11 PM    Date of Service: 8/12/2024    Primary Cardiologist: Akash    Chief Complaint: Follow-up for CHF    Interim History:  Interim events noted, since I last saw him 8/4/2024.  Remains hospitalized.    Net -17 L, creatinine as high as 5.2 and INR at one point greater than 10.    Denies chest pain.  Less breast tenderness.  Breathing improved.  Swelling improved.  Currently on IV fluids.    Review of Systems:   Negative except as described above    Problem List:  Patient Active Problem List   Diagnosis    Chest pain    Closed fracture of nasal bones    Injury of face    Retroperitoneal hematoma    Vitamin D deficiency    Acute chest pain    Acute cholecystitis    Gastrointestinal hemorrhage    Polyp of colon    Right upper quadrant abdominal pain    Diarrhea    Current moderate episode of major depressive disorder without prior episode (HCC)    Hypertension    H/O mitral valve replacement with mechanical valve    H/O mechanical aortic valve replacement    Coronary artery disease involving native coronary artery of native heart without angina pectoris    Paravalvular leak (prosthetic valve)    Hx of CABG    Warfarin anticoagulation    Supratherapeutic INR    Moderate obesity    Dyspnea    Alcohol withdrawal syndrome without complication (HCC)    DTs (delirium tremens) (HCC)    Anemia    Severe protein-calorie malnutrition (HCC)    Acute decompensated heart failure (HCC)    Aortic valve disease    Mitral valve disease    AV block    Pure hypercholesterolemia    Chronic obstructive pulmonary disease (HCC)    Loculated pleural effusion    Troponin level elevated    Stage 2 chronic kidney disease    Alcohol use    Breast swelling    Pleural effusion    Hypophosphatemia    Acute congestive heart failure (HCC)    FRANTZ (acute kidney injury) (HCC)    Acidosis    Hyperglycemia    Hyperlipidemia    Alcoholic liver

## 2024-08-12 NOTE — PROGRESS NOTES
Pharmacy Consultation Note  (Warfarin Dosing and Monitoring)    Initial consult date: 8/2  Consulting Provider: Latanya Dorsey is a 59 y.o. male for whom pharmacy has been asked to manage warfarin therapy.     Weight:   Wt Readings from Last 1 Encounters:   08/11/24 92.7 kg (204 lb 5.9 oz)       TSH:    Lab Results   Component Value Date/Time    TSH 0.95 05/08/2024 02:24 AM       Hepatic Function Panel:                            Lab Results   Component Value Date/Time    ALKPHOS 116 08/11/2024 03:10 AM    ALT <5 08/11/2024 03:10 AM    AST 22 08/11/2024 03:10 AM    BILITOT 0.5 08/11/2024 03:10 AM    BILIDIR 0.3 08/11/2024 03:10 AM    IBILI 0.2 08/11/2024 03:10 AM         Recent Labs     08/10/24  0415 08/11/24  0310 08/12/24  0348   HGB 9.1* 7.9* 8.0*     Date Warfarin Dose INR Heparin or LMWH Comment   8/3 NO DOSE 5 Lovenox DC'd    8/4 NO DOSE 4 --    8/5 4 mg 3.1 --    8/6 6 mg 2.6 --    8/7 6 mg 2.6 --    8/8 2 mg 3.7 --    8/9 NO DOSE 6.0 --    8/10 NO DOSE 9.5 -- Vit K 5mg x2 PO   8/11 NO DOSE >10    -- Vitamin K 5 mg po x 1 (0520); Vitamin K 10 mg po x 1 (1229)   8/12 NO DOSE 6.1 --             Assessment:  Patient is a 59 y.o. male on warfarin for Atrial Fibrillation, Mechanical Heart Valve (mitral), and Mechanical Heart Valve (atrial).  Patient's home warfarin dosing regimen is 4mg daily per med rec but patient filled 3mg tabs most recently.   INR possibly elevated in setting of liver dysfunction.  Goal INR 2.5 - 3.5    Plan:  No warfarin tonight.  Daily PT/INR until the INR is stable within the therapeutic range.  Pharmacist will follow and monitor/adjust dosing as necessary.    Jamie Nolen, Suraj 8/12/2024 1:49 PM   Ext: [phone]

## 2024-08-12 NOTE — PROGRESS NOTES
[facility] Hospitalist   Progress Note    Admitting Date and Time: 8/2/2024 12:11 PM  Admit Dx: Anemia [D64.9]  Anemia, unspecified type [D64.9]    Subjective:    Patient was admitted with Anemia [D64.9]  Anemia, unspecified type [D64.9]. Patient denies fever, chills, cp, sob, n/v.     cephALEXin  500 mg Oral 2 times per day    [Held by provider] bumetanide  2 mg Oral BID    tamsulosin  0.4 mg Oral Nightly    [Held by provider] metOLazone  5 mg Oral Daily    warfarin placeholder: dosing by pharmacy   Other RX Placeholder    methocarbamol  750 mg Oral 4x Daily    aspirin  81 mg Oral Daily    atorvastatin  20 mg Oral Nightly    metoprolol succinate  25 mg Oral BID    Vitamin D  1,000 Units Oral Daily    pantoprazole  40 mg Oral BID AC    lactulose  20 g Oral Daily    busPIRone  5 mg Oral TID    folic acid  1 mg Oral Daily    [Held by provider] hydrALAZINE  10 mg Oral 3 times per day    isosorbide dinitrate  10 mg Oral TID    midodrine  5 mg Oral TID WC    mirtazapine  7.5 mg Oral Nightly    nicotine  1 patch TransDERmal Daily    thiamine  100 mg Oral Daily    rifAXIMin  550 mg Oral BID    sodium chloride flush  5-40 mL IntraVENous 2 times per day     oxyCODONE, 5 mg, Q6H PRN  oxyCODONE, 10 mg, Q6H PRN  sodium chloride, , PRN  sodium chloride flush, 5-40 mL, PRN  sodium chloride, , PRN  polyethylene glycol, 17 g, Daily PRN  prochlorperazine, 10 mg, Q8H PRN   Or  prochlorperazine, 10 mg, Q6H PRN         Objective:    BP (!) 89/51   Pulse 88   Temp 98 °F (36.7 °C) (Oral)   Resp 18   Ht 1.778 m (5' 10\")   Wt 92.7 kg (204 lb 5.9 oz)   SpO2 99%   BMI 29.32 kg/m²   Skin: warm and dry, no rash or erythema  Pulmonary/Chest: clear to auscultation bilaterally- no wheezes, rales or rhonchi, normal air movement, no respiratory distress  Cardiovascular: rhythm reg at rate of 84  Abdomen: soft, non-tender, non-distended, normal bowel sounds, no masses or organomegaly  Extremities: no cyanosis, no clubbing,    Final Result   Cardiomegaly with underlying chronic interstitial changes seen in the lung   fields. No new focal parenchymal opacification present.         US BREAST LIMITED LEFT    (Results Pending)       Assessment:    Principal Problem:    Anemia  Active Problems:    H/O mitral valve replacement with mechanical valve    H/O mechanical aortic valve replacement    Supratherapeutic INR    AV block    Acute blood loss anemia    Heart failure with mid-range ejection fraction (HFmEF) (HCC)    Acute on chronic combined systolic and diastolic congestive heart failure (HCC)    Normally functioning cardiac pacemaker present    Hepatic cirrhosis (HCC)    Acute on chronic systolic (congestive) heart failure (HCC)  Resolved Problems:    * No resolved hospital problems. *      Plan:  Acute on chronic systolic chf pt off bumex gtt continue medication  cardio following  Acidosis monitor  Varinder  bumex gtt stopped monitor on iv fluids  renal following  Hypomagnesemia monitor and replace prn  Anemia monitor s/p transfusion hematology on consult.  Cirrhosis with hepatic encephalopathy likely due to alcohol abuse  continue lactulose/rifaximin  continue thiaimine.   Hyperlipidemia continue statin  Htn continue med  RLE cellulitis continue abx  NSVT pt had 19 beats of vtach per nursing. Replace K and monitor mag and replace prn. Cardio on consult  Hypokalemia monitor and replace prn  Elevated inr pt given vit k continue to monitor. Pt not actively bleeding  Thrombocytopenia monitor    Pt notes he feels better today compared to yesterday      D/w gi    Encourage activity      Electronically signed by Nikhil Preston, DO on 8/12/2024 at 4:25 PM

## 2024-08-12 NOTE — PLAN OF CARE

## 2024-08-12 NOTE — PROGRESS NOTES
PROGRESS NOTE  By Federico Riddle M.D.    [facility]  Dr. Tejinder Subramanian M.D.,  Dr. Roly Pinzon M.D.,   Dr. Osmany Rdz D.O.,  Dr. Lance Lassiter M.D.,  Dr. Min Mcdaniels D.O.,          Len Sunita  59 y.o.  male    SUBJECTIVE:  Denies abdominal pain.  Denies nausea vomiting.  Reports bowel movement without blood or melena.  Admits to drinking alcohol continue \"couple weeks ago\"    OBJECTIVE:    /74   Pulse 80   Temp 98.2 °F (36.8 °C) (Oral)   Resp 18   Ht 1.778 m (5' 10\")   Wt 92.7 kg (204 lb 5.9 oz)   SpO2 99%   BMI 29.32 kg/m²     General: NAD/obese  male.  AAOx3  HEENT: Anicteric sclera/moist oral mucosa  Neck: Supple with trachea midline  Chest: Symmetric excursion/nonlabored respirations  Cor: Regular  Abd.: Soft and obese.  Nontender  Extr.:  Decreased muscle tone and bulk which is consistent with age and condition  Skin: Warm and dry/anicteric      DATA:    Monitor data reviewed -paced rhythm is identified     Lab Results   Component Value Date/Time    WBC 5.3 08/12/2024 03:48 AM    RBC 3.33 08/12/2024 03:48 AM    HGB 8.0 08/12/2024 03:48 AM    HCT 27.1 08/12/2024 03:48 AM    MCV 81.4 08/12/2024 03:48 AM    MCH 24.0 08/12/2024 03:48 AM    MCHC 29.5 08/12/2024 03:48 AM    RDW 22.3 08/12/2024 03:48 AM     08/05/2024 07:30 AM    MPV Can not be calculated 08/12/2024 03:48 AM     Lab Results   Component Value Date/Time     08/12/2024 03:48 AM    K 3.4 08/12/2024 03:48 AM    K 3.9 09/15/2022 03:08 PM    CL 98 08/12/2024 03:48 AM    CO2 21 08/12/2024 03:48 AM    BUN 91 08/12/2024 03:48 AM    CREATININE 4.6 08/12/2024 03:48 AM    CALCIUM 9.4 08/12/2024 03:48 AM    BILITOT 0.5 08/11/2024 03:10 AM    ALKPHOS 116 08/11/2024 03:10 AM    AST 22 08/11/2024 03:10 AM    ALT <5 08/11/2024 03:10 AM     Lab Results   Component Value Date/Time    LIPASE 32 08/02/2024 12:35 PM     No results found for: \"AMYLASE\"      ASSESSMENT/PLAN:  Patient Active Problem List    Diagnosis    Chest pain    Closed fracture of nasal bones    Injury of face    Retroperitoneal hematoma    Vitamin D deficiency    Acute chest pain    Acute cholecystitis    Gastrointestinal hemorrhage    Polyp of colon    Right upper quadrant abdominal pain    Diarrhea    Current moderate episode of major depressive disorder without prior episode (HCC)    Hypertension    H/O mitral valve replacement with mechanical valve    H/O mechanical aortic valve replacement    Coronary artery disease involving native coronary artery of native heart without angina pectoris    Paravalvular leak (prosthetic valve)    Hx of CABG    Warfarin anticoagulation    Supratherapeutic INR    Moderate obesity    Dyspnea    Alcohol withdrawal syndrome without complication (HCC)    DTs (delirium tremens) (HCC)    Anemia    Severe protein-calorie malnutrition (HCC)    Acute decompensated heart failure (HCC)    Aortic valve disease    Mitral valve disease    AV block    Pure hypercholesterolemia    Chronic obstructive pulmonary disease (HCC)    Loculated pleural effusion    Troponin level elevated    Stage 2 chronic kidney disease    Alcohol use    Breast swelling    Pleural effusion    Hypophosphatemia    Acute congestive heart failure (HCC)    FRANTZ (acute kidney injury) (HCC)    Acidosis    Hyperglycemia    Hyperlipidemia    Alcoholic liver disease, unspecified (HCC)    Thrombocytopenia (HCC)    Delirium    Acute blood loss anemia    Heart failure with mid-range ejection fraction (HFmEF) (HCC)    Acute on chronic combined systolic and diastolic congestive heart failure (HCC)    Normally functioning cardiac pacemaker present    Hepatic cirrhosis (HCC)    Acute on chronic systolic (congestive) heart failure (HCC)     1.  Cirrhosis  -Decompensated/alcoholic  -Cannot reliably calculate MELD secondary to Coumadin therapy  -Nonelevated AFP May 2024 (1.3)  -Negative viral hepatitis serology May 2024  - prior imaging negative for ascites; no

## 2024-08-13 LAB
AMMONIA PLAS-SCNC: 91 UMOL/L (ref 16–60)
ANION GAP SERPL CALCULATED.3IONS-SCNC: 16 MMOL/L (ref 7–16)
BASOPHILS # BLD: 0.09 K/UL (ref 0–0.2)
BASOPHILS NFR BLD: 2 % (ref 0–2)
BUN SERPL-MCNC: 81 MG/DL (ref 6–20)
CALCIUM SERPL-MCNC: 9 MG/DL (ref 8.6–10.2)
CHLORIDE SERPL-SCNC: 103 MMOL/L (ref 98–107)
CO2 SERPL-SCNC: 22 MMOL/L (ref 22–29)
CREAT SERPL-MCNC: 3.6 MG/DL (ref 0.7–1.2)
EOSINOPHIL # BLD: 0.09 K/UL (ref 0.05–0.5)
EOSINOPHILS RELATIVE PERCENT: 2 % (ref 0–6)
ERYTHROCYTE [DISTWIDTH] IN BLOOD BY AUTOMATED COUNT: 22.2 % (ref 11.5–15)
GFR, ESTIMATED: 19 ML/MIN/1.73M2
GLUCOSE SERPL-MCNC: 91 MG/DL (ref 74–99)
HCT VFR BLD AUTO: 25 % (ref 37–54)
HCT VFR BLD AUTO: 25.5 % (ref 37–54)
HGB BLD-MCNC: 7.3 G/DL (ref 12.5–16.5)
HGB BLD-MCNC: 7.5 G/DL (ref 12.5–16.5)
INR PPP: 3.7
LYMPHOCYTES NFR BLD: 0.55 K/UL (ref 1.5–4)
LYMPHOCYTES RELATIVE PERCENT: 11 % (ref 20–42)
MCH RBC QN AUTO: 24 PG (ref 26–35)
MCHC RBC AUTO-ENTMCNC: 29.2 G/DL (ref 32–34.5)
MCV RBC AUTO: 82.2 FL (ref 80–99.9)
MONOCYTES NFR BLD: 0.27 K/UL (ref 0.1–0.95)
MONOCYTES NFR BLD: 5 % (ref 2–12)
NEUTROPHILS NFR BLD: 81 % (ref 43–80)
NEUTS SEG NFR BLD: 4.2 K/UL (ref 1.8–7.3)
NUCLEATED RED BLOOD CELLS: 1 PER 100 WBC
PLATELET CONFIRMATION: NORMAL
PLATELET, FLUORESCENCE: 93 K/UL (ref 130–450)
PMV BLD AUTO: ABNORMAL FL (ref 7–12)
POTASSIUM SERPL-SCNC: 3.4 MMOL/L (ref 3.5–5)
PROTHROMBIN TIME: 39.5 SEC (ref 9.3–12.4)
RBC # BLD AUTO: 3.04 M/UL (ref 3.8–5.8)
RBC # BLD: ABNORMAL 10*6/UL
SODIUM SERPL-SCNC: 141 MMOL/L (ref 132–146)
WBC # BLD: ABNORMAL 10*3/UL
WBC OTHER # BLD: 5.2 K/UL (ref 4.5–11.5)

## 2024-08-13 PROCEDURE — 2580000003 HC RX 258: Performed by: INTERNAL MEDICINE

## 2024-08-13 PROCEDURE — 82270 OCCULT BLOOD FECES: CPT

## 2024-08-13 PROCEDURE — 82140 ASSAY OF AMMONIA: CPT

## 2024-08-13 PROCEDURE — 6370000000 HC RX 637 (ALT 250 FOR IP): Performed by: CLINICAL NURSE SPECIALIST

## 2024-08-13 PROCEDURE — 85018 HEMOGLOBIN: CPT

## 2024-08-13 PROCEDURE — 6370000000 HC RX 637 (ALT 250 FOR IP): Performed by: INTERNAL MEDICINE

## 2024-08-13 PROCEDURE — 85014 HEMATOCRIT: CPT

## 2024-08-13 PROCEDURE — 2580000003 HC RX 258

## 2024-08-13 PROCEDURE — 99232 SBSQ HOSP IP/OBS MODERATE 35: CPT | Performed by: INTERNAL MEDICINE

## 2024-08-13 PROCEDURE — 6370000000 HC RX 637 (ALT 250 FOR IP): Performed by: NURSE PRACTITIONER

## 2024-08-13 PROCEDURE — 85610 PROTHROMBIN TIME: CPT

## 2024-08-13 PROCEDURE — 80048 BASIC METABOLIC PNL TOTAL CA: CPT

## 2024-08-13 PROCEDURE — 6370000000 HC RX 637 (ALT 250 FOR IP)

## 2024-08-13 PROCEDURE — 4B02XSZ MEASUREMENT OF CARDIAC PACEMAKER, EXTERNAL APPROACH: ICD-10-PCS | Performed by: INTERNAL MEDICINE

## 2024-08-13 PROCEDURE — 85025 COMPLETE CBC W/AUTO DIFF WBC: CPT

## 2024-08-13 PROCEDURE — 36415 COLL VENOUS BLD VENIPUNCTURE: CPT

## 2024-08-13 PROCEDURE — 2060000000 HC ICU INTERMEDIATE R&B

## 2024-08-13 RX ORDER — POTASSIUM CHLORIDE 1500 MG/1
40 TABLET, EXTENDED RELEASE ORAL ONCE
Status: COMPLETED | OUTPATIENT
Start: 2024-08-13 | End: 2024-08-13

## 2024-08-13 RX ORDER — WARFARIN SODIUM 1 MG/1
1 TABLET ORAL
Status: ACTIVE | OUTPATIENT
Start: 2024-08-13 | End: 2024-08-14

## 2024-08-13 RX ADMIN — MIRTAZAPINE 7.5 MG: 15 TABLET, FILM COATED ORAL at 20:44

## 2024-08-13 RX ADMIN — MIDODRINE HYDROCHLORIDE 5 MG: 5 TABLET ORAL at 18:53

## 2024-08-13 RX ADMIN — METHOCARBAMOL TABLETS 750 MG: 750 TABLET, COATED ORAL at 13:52

## 2024-08-13 RX ADMIN — OXYCODONE HYDROCHLORIDE 10 MG: 5 TABLET ORAL at 20:43

## 2024-08-13 RX ADMIN — MIDODRINE HYDROCHLORIDE 5 MG: 5 TABLET ORAL at 13:50

## 2024-08-13 RX ADMIN — ISOSORBIDE DINITRATE 10 MG: 10 TABLET ORAL at 13:50

## 2024-08-13 RX ADMIN — METHOCARBAMOL TABLETS 750 MG: 750 TABLET, COATED ORAL at 18:53

## 2024-08-13 RX ADMIN — CEPHALEXIN 500 MG: 500 CAPSULE ORAL at 10:31

## 2024-08-13 RX ADMIN — PANTOPRAZOLE SODIUM 40 MG: 40 TABLET, DELAYED RELEASE ORAL at 06:12

## 2024-08-13 RX ADMIN — RIFAXIMIN 550 MG: 550 TABLET ORAL at 10:31

## 2024-08-13 RX ADMIN — OXYCODONE HYDROCHLORIDE 10 MG: 5 TABLET ORAL at 10:31

## 2024-08-13 RX ADMIN — Medication 100 MG: at 10:31

## 2024-08-13 RX ADMIN — CEPHALEXIN 500 MG: 500 CAPSULE ORAL at 20:43

## 2024-08-13 RX ADMIN — ISOSORBIDE DINITRATE 10 MG: 10 TABLET ORAL at 10:31

## 2024-08-13 RX ADMIN — SODIUM CHLORIDE, PRESERVATIVE FREE 10 ML: 5 INJECTION INTRAVENOUS at 20:44

## 2024-08-13 RX ADMIN — POTASSIUM CHLORIDE 40 MEQ: 1500 TABLET, EXTENDED RELEASE ORAL at 10:31

## 2024-08-13 RX ADMIN — METOPROLOL SUCCINATE 25 MG: 25 TABLET, EXTENDED RELEASE ORAL at 10:31

## 2024-08-13 RX ADMIN — METHOCARBAMOL TABLETS 750 MG: 750 TABLET, COATED ORAL at 10:30

## 2024-08-13 RX ADMIN — SODIUM CHLORIDE, POTASSIUM CHLORIDE, SODIUM LACTATE AND CALCIUM CHLORIDE: 600; 310; 30; 20 INJECTION, SOLUTION INTRAVENOUS at 00:21

## 2024-08-13 RX ADMIN — ATORVASTATIN CALCIUM 20 MG: 20 TABLET, FILM COATED ORAL at 20:43

## 2024-08-13 RX ADMIN — RIFAXIMIN 550 MG: 550 TABLET ORAL at 20:43

## 2024-08-13 RX ADMIN — MIDODRINE HYDROCHLORIDE 5 MG: 5 TABLET ORAL at 10:31

## 2024-08-13 RX ADMIN — METHOCARBAMOL TABLETS 750 MG: 750 TABLET, COATED ORAL at 20:55

## 2024-08-13 RX ADMIN — BUSPIRONE HYDROCHLORIDE 5 MG: 5 TABLET ORAL at 20:44

## 2024-08-13 RX ADMIN — Medication 1000 UNITS: at 10:31

## 2024-08-13 RX ADMIN — PANTOPRAZOLE SODIUM 40 MG: 40 TABLET, DELAYED RELEASE ORAL at 18:54

## 2024-08-13 RX ADMIN — FOLIC ACID 1 MG: 1 TABLET ORAL at 10:31

## 2024-08-13 RX ADMIN — BUSPIRONE HYDROCHLORIDE 5 MG: 5 TABLET ORAL at 10:31

## 2024-08-13 RX ADMIN — BUSPIRONE HYDROCHLORIDE 5 MG: 5 TABLET ORAL at 13:50

## 2024-08-13 RX ADMIN — TAMSULOSIN HYDROCHLORIDE 0.4 MG: 0.4 CAPSULE ORAL at 20:43

## 2024-08-13 RX ADMIN — ASPIRIN 81 MG CHEWABLE TABLET 81 MG: 81 TABLET CHEWABLE at 10:31

## 2024-08-13 ASSESSMENT — PAIN SCALES - GENERAL
PAINLEVEL_OUTOF10: 0
PAINLEVEL_OUTOF10: 8

## 2024-08-13 NOTE — PROGRESS NOTES
On the phone with [employer] and representative, Maury, has no report available to print and fax.  Says enough time may have passed that history has cleared. He says I'll need to re-interrogate. Cardiology contacted.    Instructed to interrogate again.

## 2024-08-13 NOTE — PROGRESS NOTES
Call placed to [employer].  Name and callback number left with customer service who states they will have a local representative get in touch with me.

## 2024-08-13 NOTE — PLAN OF CARE
Problem: ABCDS Injury Assessment  Goal: Absence of physical injury  8/12/2024 2238 by Elena Betancourt RN  Outcome: Progressing  Flowsheets (Taken 8/12/2024 2032)  Absence of Physical Injury: Implement safety measures based on patient assessment  8/12/2024 1539 by Florentino Fernandez RN  Outcome: Progressing     Problem: Discharge Planning  Goal: Discharge to home or other facility with appropriate resources  8/12/2024 2238 by Elena Betancourt RN  Outcome: Progressing  Flowsheets (Taken 8/12/2024 2032)  Discharge to home or other facility with appropriate resources: Identify barriers to discharge with patient and caregiver  8/12/2024 1539 by Florentino Fernandez RN  Outcome: Progressing     Problem: Safety - Adult  Goal: Free from fall injury  8/12/2024 2238 by Elena Betancourt RN  Outcome: Progressing  Flowsheets (Taken 8/12/2024 2032)  Free From Fall Injury: Instruct family/caregiver on patient safety  8/12/2024 1539 by Florentino Fernandez RN  Outcome: Progressing     Problem: Pain  Goal: Verbalizes/displays adequate comfort level or baseline comfort level  8/12/2024 2238 by Elena Betancourt RN  Outcome: Progressing  8/12/2024 1539 by Florentino Fernandez RN  Outcome: Progressing     Problem: Skin/Tissue Integrity  Goal: Absence of new skin breakdown  Description: 1.  Monitor for areas of redness and/or skin breakdown  2.  Assess vascular access sites hourly  3.  Every 4-6 hours minimum:  Change oxygen saturation probe site  4.  Every 4-6 hours:  If on nasal continuous positive airway pressure, respiratory therapy assess nares and determine need for appliance change or resting period.  8/12/2024 2238 by Elena Betancourt RN  Outcome: Progressing  8/12/2024 1539 by Florentino Fernandez RN  Outcome: Progressing     Problem: Chronic Conditions and Co-morbidities  Goal: Patient's chronic conditions and co-morbidity symptoms are monitored and maintained or improved  8/12/2024 2238 by Elena Betancourt RN  Outcome: Progressing  Flowsheets (Taken

## 2024-08-13 NOTE — PROGRESS NOTES
[employer] pacer interrogation available in soft chart for review. Cardiology notified.    Electronically signed by Megan Allan RN on 8/13/2024 at 2:51 PM

## 2024-08-13 NOTE — PROGRESS NOTES
INPATIENT CARDIOLOGY FOLLOW-UP    Name: Len Dorsey    Age: 59 y.o.    Date of Admission: 8/2/2024 12:11 PM    Date of Service: 8/13/2024    Primary Cardiologist: Akash    Chief Complaint: Follow-up for CHF    Interim History:  Interim events noted, since I last saw him 8/4/2024.  Remains hospitalized.    Net -20 L, creatinine as high as 5.2 and INR at one point greater than 10.    Unchanged chronic chest pain.  Breathing improved.  Swelling improved.  Off IV fluids.    Review of Systems:   Negative except as described above    Problem List:  Patient Active Problem List   Diagnosis    Chest pain    Closed fracture of nasal bones    Injury of face    Retroperitoneal hematoma    Vitamin D deficiency    Acute chest pain    Acute cholecystitis    Gastrointestinal hemorrhage    Polyp of colon    Right upper quadrant abdominal pain    Diarrhea    Current moderate episode of major depressive disorder without prior episode (HCC)    Hypertension    H/O mitral valve replacement with mechanical valve    H/O mechanical aortic valve replacement    Coronary artery disease involving native coronary artery of native heart without angina pectoris    Paravalvular leak (prosthetic valve)    Hx of CABG    Warfarin anticoagulation    Supratherapeutic INR    Moderate obesity    Dyspnea    Alcohol withdrawal syndrome without complication (HCC)    DTs (delirium tremens) (HCC)    Anemia    Severe protein-calorie malnutrition (HCC)    Acute decompensated heart failure (HCC)    Aortic valve disease    Mitral valve disease    AV block    Pure hypercholesterolemia    Chronic obstructive pulmonary disease (HCC)    Loculated pleural effusion    Troponin level elevated    Stage 2 chronic kidney disease    Alcohol use    Breast swelling    Pleural effusion    Hypophosphatemia    Acute congestive heart failure (HCC)    FRANTZ (acute kidney injury) (HCC)    Acidosis    Hyperglycemia    Hyperlipidemia    Alcoholic liver disease, unspecified  2024    INR >10.0 (HH) 2024    PROTIME 39.5 (H) 2024    PROTIME 66.5 (H) 2024    PROTIME 111.5 (H) 2024     Lab Results   Component Value Date    TSH 0.95 2024     Lab Results   Component Value Date    LABA1C 4.9 2024     No results found for: \"EAG\"  Lab Results   Component Value Date    CHOL 122 2024    CHOL 134 2023    CHOL 86 2022     Lab Results   Component Value Date    TRIG 70 2024    TRIG 146 2023    TRIG 149 2022     Lab Results   Component Value Date    HDL 48 2024    HDL 33 2023    HDL 28 2022     No components found for: \"LDLCALC\", \"LDLCHOLESTEROL\"  Lab Results   Component Value Date    VLDL 14 2024    VLDL 29 2023    VLDL 30 2022     No results found for: \"CHOLHDLRATIO\"  No results for input(s): \"PROBNP\" in the last 72 hours.      Cardiac Tests:    EK2024: Ventricular pacing 92 bpm, underlying rhythm unclear due to artifact    Telemetry: Ventricular pacing    Chest X-ray:     Impression:        Cardiomegaly with underlying chronic interstitial changes seen in the lung  fields. No new focal parenchymal opacification present.       Echocardiogram:   TTE 5/10/24 KA    Left Ventricle: The EF by visual approximation is 40 - 45%. Findings consistent with moderate concentric hypertrophy. Elevated left ventricular filling pressure.    Right Ventricle: Right ventricle size is normal. Reduced systolic function.    Aortic Valve: Not well visualized. Mechanical valve. AV mean gradient is 12 mmHg. LVOT:AV VTI Index is 0.34. AV area by continuity VTI is 1.1 cm2. AV area by peak velocity is 1.0 cm2.    Mitral Valve: Not well visualized. Mechanical valve. MV mean gradient is 4 mmHg.    Tricuspid Valve: Moderate regurgitation.    Pulmonic Valve: The pulmonic valve was not well visualized.    Left Atrium: Left atrium is severely dilated.    Image quality is technically difficult. Contrast used:

## 2024-08-13 NOTE — PROGRESS NOTES
8/13/2024 4:41 PM  Len Dorsey  [id number]    Subjective:  urology called to bedside  Patient traumatically removed marks on accident  It was replaced by nursing  Draining cherry colored urine     Review of Systems  Constitutional: No fever or chills   Respiratory: negative for cough and hemoptysis  Cardiovascular: negative for chest pain and dyspnea  Gastrointestinal: negative for abdominal pain, diarrhea, nausea and vomiting   : See above  Derm: negative for rash and skin lesion(s)  Neurological: negative for seizures and tremors  Musculoskeletal: Negative    Psychiatric: Negative   All other reviews are negative      Scheduled Meds:   warfarin  1 mg Oral Once    cephALEXin  500 mg Oral 2 times per day    [Held by provider] bumetanide  2 mg Oral BID    tamsulosin  0.4 mg Oral Nightly    [Held by provider] metOLazone  5 mg Oral Daily    warfarin placeholder: dosing by pharmacy   Other RX Placeholder    methocarbamol  750 mg Oral 4x Daily    aspirin  81 mg Oral Daily    atorvastatin  20 mg Oral Nightly    metoprolol succinate  25 mg Oral BID    Vitamin D  1,000 Units Oral Daily    pantoprazole  40 mg Oral BID AC    lactulose  20 g Oral Daily    busPIRone  5 mg Oral TID    folic acid  1 mg Oral Daily    [Held by provider] hydrALAZINE  10 mg Oral 3 times per day    isosorbide dinitrate  10 mg Oral TID    midodrine  5 mg Oral TID WC    mirtazapine  7.5 mg Oral Nightly    nicotine  1 patch TransDERmal Daily    thiamine  100 mg Oral Daily    rifAXIMin  550 mg Oral BID    sodium chloride flush  5-40 mL IntraVENous 2 times per day       Objective:  Vitals:    08/13/24 1615   BP: 92/80   Pulse:    Resp:    Temp:    SpO2:          Allergies: Patient has no known allergies.    General Appearance: alert and oriented to person, place and time and in no acute distress  Skin: no rash or erythema  Head: normocephalic and atraumatic  Pulmonary/Chest: normal air movement, no respiratory distress  Abdomen: soft, non-tender,  non-distended  Genitourinary: marks with cherry colored urine   Extremities: no cyanosis, clubbing or edema         Labs:     Recent Labs     08/13/24  0348      K 3.4*      CO2 22   BUN 81*   CREATININE 3.6*   GLUCOSE 91   CALCIUM 9.0       Lab Results   Component Value Date/Time    HGB 7.3 08/13/2024 03:48 AM    HCT 25.0 08/13/2024 03:48 AM       No results found for: \"PSA\"      Assessment/Plan:  Urinary retention   FRANTZ on CKD   Traumatic marks catheter removal   Traumatic gross hematuria   Supratherapuetic INR     His marks was replaced by nursing. Looked malpositioned. I deflated the balloon and readvanced the marks catheter. This was then hand irrigated easily. Urine light pink       Continue the marks   Irrigate the marks to maintain patency   Will follow       ARNALDO Yu - CNP   [facility]  Urology

## 2024-08-13 NOTE — PROGRESS NOTES
Department of Internal Medicine  Nephrology Progress Note    Events reviewed.    SUBJECTIVE:   We are following for FRANTZ and fluid overload. Patient reports no new complaints.     PHYSICAL EXAM:      Vitals:    VITALS:  /72   Pulse 97   Temp 97.8 °F (36.6 °C) (Oral)   Resp 18   Ht 1.778 m (5' 10\")   Wt 93.5 kg (206 lb 2.1 oz)   SpO2 100%   BMI 29.58 kg/m²   24HR BLOOD PRESSURE RANGE:  Systolic (24hrs), Av , Min:89 , Max:129   ; Diastolic (24hrs), Av, Min:51, Max:74    24HR INTAKE/OUTPUT:    Intake/Output Summary (Last 24 hours) at 2024 0850  Last data filed at 2024 0252  Gross per 24 hour   Intake --   Output 3300 ml   Net -3300 ml       Constitutional:  awake NAD  HEENT:  EOMI  Respiratory:  CTA  Cardiovascular/Edema:  S1 S2  Gastrointestinal:  +BS  Neurologic:  no deficits  Skin:  warm dry  Other:  BLE ++edema     DATA:    CBC with Differential:    Lab Results   Component Value Date/Time    WBC 5.2 2024 03:48 AM    RBC 3.04 2024 03:48 AM    HGB 7.3 2024 03:48 AM    HCT 25.0 2024 03:48 AM     2024 07:30 AM    MCV 82.2 2024 03:48 AM    MCH 24.0 2024 03:48 AM    MCHC 29.2 2024 03:48 AM    RDW 22.2 2024 03:48 AM    NRBC 1 2024 03:48 AM    METASPCT DUPLICATE 2023 04:12 AM    LYMPHOPCT 11 2024 03:48 AM    PROMYELOPCT DUPLICATE 2023 04:12 AM    MONOPCT 5 2024 03:48 AM    MYELOPCT DUPLICATE 2023 04:12 AM    EOSPCT 2 2024 03:48 AM    BASOPCT 2 2024 03:48 AM    MONOSABS 0.27 2024 03:48 AM    LYMPHSABS 0.55 2024 03:48 AM    EOSABS 0.09 2024 03:48 AM    BASOSABS 0.09 2024 03:48 AM     CMP:    Lab Results   Component Value Date/Time     2024 03:48 AM    K 3.4 2024 03:48 AM    K 3.9 09/15/2022 03:08 PM     2024 03:48 AM    CO2 22 2024 03:48 AM    BUN 81 2024 03:48 AM    CREATININE 3.6 2024 03:48 AM    GFRAA >60

## 2024-08-13 NOTE — CARE COORDINATION
Chart review - Hgb 7.3 trending downward, BUN 81 Cr 3.6. BP's running low, bumex, hydralazine, zaroxolyn on hold. Continue marks catheter for urinary retention per Urology. Right leg cellulitis PO keflex. Anticipated to discharge home. Will continue to follow.  Deja ELENAN, RN  Case Management

## 2024-08-13 NOTE — PROGRESS NOTES
[facility] Hospitalist   Progress Note    Admitting Date and Time: 8/2/2024 12:11 PM  Admit Dx: Anemia [D64.9]  Anemia, unspecified type [D64.9]    Subjective:    Patient was admitted with Anemia [D64.9]  Anemia, unspecified type [D64.9]. Patient denies fever, chills, cp, sob, n/v.     warfarin  1 mg Oral Once    cephALEXin  500 mg Oral 2 times per day    [Held by provider] bumetanide  2 mg Oral BID    tamsulosin  0.4 mg Oral Nightly    [Held by provider] metOLazone  5 mg Oral Daily    warfarin placeholder: dosing by pharmacy   Other RX Placeholder    methocarbamol  750 mg Oral 4x Daily    aspirin  81 mg Oral Daily    atorvastatin  20 mg Oral Nightly    metoprolol succinate  25 mg Oral BID    Vitamin D  1,000 Units Oral Daily    pantoprazole  40 mg Oral BID AC    lactulose  20 g Oral Daily    busPIRone  5 mg Oral TID    folic acid  1 mg Oral Daily    [Held by provider] hydrALAZINE  10 mg Oral 3 times per day    isosorbide dinitrate  10 mg Oral TID    midodrine  5 mg Oral TID WC    mirtazapine  7.5 mg Oral Nightly    nicotine  1 patch TransDERmal Daily    thiamine  100 mg Oral Daily    rifAXIMin  550 mg Oral BID    sodium chloride flush  5-40 mL IntraVENous 2 times per day     oxyCODONE, 5 mg, Q6H PRN  oxyCODONE, 10 mg, Q6H PRN  sodium chloride, , PRN  sodium chloride flush, 5-40 mL, PRN  sodium chloride, , PRN  polyethylene glycol, 17 g, Daily PRN  prochlorperazine, 10 mg, Q8H PRN   Or  prochlorperazine, 10 mg, Q6H PRN         Objective:    BP 92/80   Pulse 99   Temp 97.5 °F (36.4 °C) (Oral)   Resp 18   Ht 1.778 m (5' 10\")   Wt 93.5 kg (206 lb 2.1 oz)   SpO2 100%   BMI 29.58 kg/m²   Skin: warm and dry, no rash or erythema  Pulmonary/Chest: clear to auscultation bilaterally- no wheezes, rales or rhonchi, normal air movement, no respiratory distress  Cardiovascular: rhythm reg at rate of 98  Abdomen: soft, non-tender, non-distended, normal bowel sounds, no masses or organomegaly  Extremities:  no cyanosis, no clubbing, and no edema      Recent Labs     08/11/24  1550 08/12/24  0348 08/13/24  0348    135 141   K 4.2 3.4* 3.4*   CL 96* 98 103   CO2 20* 21* 22   BUN 89* 91* 81*   CREATININE 5.0* 4.6* 3.6*   GLUCOSE 100* 94 91   CALCIUM 9.0 9.4 9.0       Recent Labs     08/11/24  0310 08/12/24  0348 08/13/24  0348   WBC 7.0 5.3 5.2   RBC 3.32* 3.33* 3.04*   HGB 7.9* 8.0* 7.3*   HCT 27.2* 27.1* 25.0*   MCV 81.9 81.4 82.2   MCH 23.8* 24.0* 24.0*   MCHC 29.0* 29.5* 29.2*   RDW 22.2* 22.3* 22.2*   MPV Can not be calculated Can not be calculated Can not be calculated       CBC with Differential:    Lab Results   Component Value Date/Time    WBC 5.2 08/13/2024 03:48 AM    RBC 3.04 08/13/2024 03:48 AM    HGB 7.3 08/13/2024 03:48 AM    HCT 25.0 08/13/2024 03:48 AM     08/05/2024 07:30 AM    MCV 82.2 08/13/2024 03:48 AM    MCH 24.0 08/13/2024 03:48 AM    MCHC 29.2 08/13/2024 03:48 AM    RDW 22.2 08/13/2024 03:48 AM    NRBC 1 08/13/2024 03:48 AM    METASPCT DUPLICATE 07/18/2023 04:12 AM    LYMPHOPCT 11 08/13/2024 03:48 AM    PROMYELOPCT DUPLICATE 07/18/2023 04:12 AM    MONOPCT 5 08/13/2024 03:48 AM    MYELOPCT DUPLICATE 07/18/2023 04:12 AM    EOSPCT 2 08/13/2024 03:48 AM    BASOPCT 2 08/13/2024 03:48 AM    MONOSABS 0.27 08/13/2024 03:48 AM    LYMPHSABS 0.55 08/13/2024 03:48 AM    EOSABS 0.09 08/13/2024 03:48 AM    BASOSABS 0.09 08/13/2024 03:48 AM     BMP:    Lab Results   Component Value Date/Time     08/13/2024 03:48 AM    K 3.4 08/13/2024 03:48 AM    K 3.9 09/15/2022 03:08 PM     08/13/2024 03:48 AM    CO2 22 08/13/2024 03:48 AM    BUN 81 08/13/2024 03:48 AM    CREATININE 3.6 08/13/2024 03:48 AM    CALCIUM 9.0 08/13/2024 03:48 AM    GFRAA >60 09/15/2022 03:08 PM    LABGLOM 19 08/13/2024 03:48 AM    LABGLOM 31 03/05/2024 05:57 AM    GLUCOSE 91 08/13/2024 03:48 AM        Radiology:   Vascular duplex lower extremity venous right   Final Result   No evidence of DVT in the right lower extremity.

## 2024-08-13 NOTE — PROGRESS NOTES
Pharmacy Consultation Note  (Warfarin Dosing and Monitoring)    Initial consult date: 8/2  Consulting Provider: Latanya Dorsey is a 59 y.o. male for whom pharmacy has been asked to manage warfarin therapy.     Weight:   Wt Readings from Last 1 Encounters:   08/13/24 93.5 kg (206 lb 2.1 oz)       TSH:    Lab Results   Component Value Date/Time    TSH 0.95 05/08/2024 02:24 AM       Hepatic Function Panel:                            Lab Results   Component Value Date/Time    ALKPHOS 116 08/11/2024 03:10 AM    ALT <5 08/11/2024 03:10 AM    AST 22 08/11/2024 03:10 AM    BILITOT 0.5 08/11/2024 03:10 AM    BILIDIR 0.3 08/11/2024 03:10 AM    IBILI 0.2 08/11/2024 03:10 AM         Recent Labs     08/11/24  0310 08/12/24  0348 08/13/24  0348   HGB 7.9* 8.0* 7.3*     Date Warfarin Dose INR Heparin or LMWH Comment   8/3 NO DOSE 5 Lovenox DC'd    8/4 NO DOSE 4 --    8/5 4 mg 3.1 --    8/6 6 mg 2.6 --    8/7 6 mg 2.6 --    8/8 2 mg 3.7 --    8/9 NO DOSE 6.0 --    8/10 NO DOSE 9.5 -- Vit K 5mg x2 PO   8/11 NO DOSE >10    -- Vitamin K 5 mg po x 1 (0520); Vitamin K 10 mg po x 1 (1229)   8/12 NO DOSE 6.1 --    8/13 1 mg 3.7       Assessment:  Patient is a 59 y.o. male on warfarin for Atrial Fibrillation, Mechanical Heart Valve (mitral), and Mechanical Heart Valve (atrial).  Patient's home warfarin dosing regimen is 4mg daily per med rec but patient filled 3mg tabs most recently. Per cardiology, the patient is unable to have his INR checked with appointments.    INR possibly elevated in setting of liver dysfunction.  Goal INR 2.5 - 3.5    Plan:  Will dose 1 mg of warfarin tonight.   Daily PT/INR until the INR is stable within the therapeutic range.  Pharmacist will closely follow and monitor/adjust warfarin dosing as necessary.    Jamie Nolen, PharmD 8/13/2024 12:02 PM   Ext: [phone]

## 2024-08-13 NOTE — PROGRESS NOTES
Consulted for PIV access. Patient not wanting to cooperate at this time. Will place PIV when he is ready. Please call [phone]. Discussed with MELIDA Taveras.   Electronically signed by Rachael Resendiz RN on 8/13/2024 at 9:22 AM

## 2024-08-13 NOTE — PROGRESS NOTES
PROGRESS NOTE  By Federico Riddle M.D.    [facility]  Dr. Tejinder Subramanian M.D.,  Dr. Roly Pinzon M.D.,   Dr. Osmany Rdz D.O.,  Dr. Lance Lassiter M.D.,  MAYRA HooperO.,          Len Sunita  59 y.o.  male    SUBJECTIVE:  No new complaints including no abdominal pain, no nausea no vomiting    OBJECTIVE:    /68   Pulse 91   Temp 97.7 °F (36.5 °C) (Oral)   Resp 18   Ht 1.778 m (5' 10\")   Wt 93.5 kg (206 lb 2.1 oz)   SpO2 100%   BMI 29.58 kg/m²     General: NAD/ male.  AAOx3  HEENT: Anicteric sclera/moist oral mucosa  Neck: Supple with trachea midline  Chest: Symmetric excursion/nonlabored respirations  Abd.: Soft and obese/nontender  Extr.:  No significant peripheral edema  Skin: Warm and dry      DATA:       Lab Results   Component Value Date/Time    WBC 5.2 08/13/2024 03:48 AM    RBC 3.04 08/13/2024 03:48 AM    HGB 7.3 08/13/2024 03:48 AM    HCT 25.0 08/13/2024 03:48 AM    MCV 82.2 08/13/2024 03:48 AM    MCH 24.0 08/13/2024 03:48 AM    MCHC 29.2 08/13/2024 03:48 AM    RDW 22.2 08/13/2024 03:48 AM     08/05/2024 07:30 AM    MPV Can not be calculated 08/13/2024 03:48 AM     Lab Results   Component Value Date/Time     08/13/2024 03:48 AM    K 3.4 08/13/2024 03:48 AM    K 3.9 09/15/2022 03:08 PM     08/13/2024 03:48 AM    CO2 22 08/13/2024 03:48 AM    BUN 81 08/13/2024 03:48 AM    CREATININE 3.6 08/13/2024 03:48 AM    CALCIUM 9.0 08/13/2024 03:48 AM    BILITOT 0.5 08/11/2024 03:10 AM    ALKPHOS 116 08/11/2024 03:10 AM    AST 22 08/11/2024 03:10 AM    ALT <5 08/11/2024 03:10 AM     Lab Results   Component Value Date/Time    LIPASE 32 08/02/2024 12:35 PM     No results found for: \"AMYLASE\"      ASSESSMENT/PLAN:  Patient Active Problem List   Diagnosis    Chest pain    Closed fracture of nasal bones    Injury of face    Retroperitoneal hematoma    Vitamin D deficiency    Acute chest pain    Acute cholecystitis    Gastrointestinal hemorrhage    Polyp of

## 2024-08-13 NOTE — PROGRESS NOTES
RN received call from KULDEEP that patient reported feeling like he \"exploded\".  RN entered room to find marks on floor and patient bleeding large amount of blood from urethra.  Ice was applied to suprapubic/penis with gauze in attempt to control bleeding. Vitals obtained: BP 92/80. Dr. Preston and Urology were both notified.  Coude catheter insterted by RN CM to replace patency.  Catheter was irrigated by Urology NP Allison and she enlarged the balloon with another 10cc sterile water.     Discussed resumption of coumadin scheduled for this evening.  Instructed to hold.    Patient's fiance arrived while Urology at bedside.  Updated.

## 2024-08-13 NOTE — PROGRESS NOTES
Patient is agitated, irritable, non-compliant, and uncooperative today.  RN and patient discussed him being \"off\" from last time he was assigned to me.  He agreed and states that he feels foggy like he can't think straight.  He refused to cooperate with IV team and states he was in pain.  Pain medication has since been given.  During med pass patient is angry that lactulose was put in front of him and that he took a sip of it. He is swearing and verbalizing complaints about being here and about lactulose.  He states that a doctor told him he didn't need to take it anymore.  RN reviewed physician notes from the last 2 days and can't personally find any mention of this.  RN lets patient know that the medication is still ordered but will talk to doctors as they round to get a clarified answer.  He says even if they want him on it he will refuse because its disgusting and makes him want to throw up, despite willingly taking it for this same RN 8/7 with no difficulty.  RN had firm discussion with patient about appropriate language and respectful treatment of staff and he states \"I just wont talk to anybody then\"    During this encounter, RN noted dried blood extending from nostrils to patient upper lip. No active nosebleed.  Cleaned up. Dr. Preston was notified on rounds.

## 2024-08-14 LAB
ANION GAP SERPL CALCULATED.3IONS-SCNC: 14 MMOL/L (ref 7–16)
BASOPHILS # BLD: 0.05 K/UL (ref 0–0.2)
BASOPHILS NFR BLD: 1 % (ref 0–2)
BUN SERPL-MCNC: 76 MG/DL (ref 6–20)
CALCIUM SERPL-MCNC: 8.9 MG/DL (ref 8.6–10.2)
CHLORIDE SERPL-SCNC: 105 MMOL/L (ref 98–107)
CO2 SERPL-SCNC: 22 MMOL/L (ref 22–29)
CREAT SERPL-MCNC: 3 MG/DL (ref 0.7–1.2)
DATE, STOOL #1: ABNORMAL
EOSINOPHIL # BLD: 0.32 K/UL (ref 0.05–0.5)
EOSINOPHILS RELATIVE PERCENT: 5 % (ref 0–6)
ERYTHROCYTE [DISTWIDTH] IN BLOOD BY AUTOMATED COUNT: 22.5 % (ref 11.5–15)
GFR, ESTIMATED: 23 ML/MIN/1.73M2
GLUCOSE SERPL-MCNC: 98 MG/DL (ref 74–99)
HCT VFR BLD AUTO: 22.7 % (ref 37–54)
HEMOCCULT SP1 STL QL: POSITIVE
HGB BLD-MCNC: 6.6 G/DL (ref 12.5–16.5)
IMM GRANULOCYTES # BLD AUTO: <0.03 K/UL (ref 0–0.58)
IMM GRANULOCYTES NFR BLD: 0 % (ref 0–5)
INR PPP: 3
LYMPHOCYTES NFR BLD: 1.07 K/UL (ref 1.5–4)
LYMPHOCYTES RELATIVE PERCENT: 16 % (ref 20–42)
MCH RBC QN AUTO: 24.4 PG (ref 26–35)
MCHC RBC AUTO-ENTMCNC: 29.1 G/DL (ref 32–34.5)
MCV RBC AUTO: 83.8 FL (ref 80–99.9)
MONOCYTES NFR BLD: 0.97 K/UL (ref 0.1–0.95)
MONOCYTES NFR BLD: 14 % (ref 2–12)
NEUTROPHILS NFR BLD: 64 % (ref 43–80)
NEUTS SEG NFR BLD: 4.37 K/UL (ref 1.8–7.3)
PLATELET CONFIRMATION: NORMAL
PLATELET, FLUORESCENCE: 94 K/UL (ref 130–450)
PMV BLD AUTO: ABNORMAL FL (ref 7–12)
POTASSIUM SERPL-SCNC: 3.4 MMOL/L (ref 3.5–5)
PROTHROMBIN TIME: 31.6 SEC (ref 9.3–12.4)
RBC # BLD AUTO: 2.71 M/UL (ref 3.8–5.8)
RBC # BLD: ABNORMAL 10*6/UL
SODIUM SERPL-SCNC: 141 MMOL/L (ref 132–146)
TIME, STOOL #1: 2120
WBC OTHER # BLD: 6.8 K/UL (ref 4.5–11.5)

## 2024-08-14 PROCEDURE — 80048 BASIC METABOLIC PNL TOTAL CA: CPT

## 2024-08-14 PROCEDURE — 6370000000 HC RX 637 (ALT 250 FOR IP): Performed by: NURSE PRACTITIONER

## 2024-08-14 PROCEDURE — 86850 RBC ANTIBODY SCREEN: CPT

## 2024-08-14 PROCEDURE — 6370000000 HC RX 637 (ALT 250 FOR IP): Performed by: INTERNAL MEDICINE

## 2024-08-14 PROCEDURE — 85610 PROTHROMBIN TIME: CPT

## 2024-08-14 PROCEDURE — 6370000000 HC RX 637 (ALT 250 FOR IP): Performed by: HOSPITALIST

## 2024-08-14 PROCEDURE — 86900 BLOOD TYPING SEROLOGIC ABO: CPT

## 2024-08-14 PROCEDURE — 6370000000 HC RX 637 (ALT 250 FOR IP)

## 2024-08-14 PROCEDURE — 86923 COMPATIBILITY TEST ELECTRIC: CPT

## 2024-08-14 PROCEDURE — 2580000003 HC RX 258: Performed by: INTERNAL MEDICINE

## 2024-08-14 PROCEDURE — 85025 COMPLETE CBC W/AUTO DIFF WBC: CPT

## 2024-08-14 PROCEDURE — P9016 RBC LEUKOCYTES REDUCED: HCPCS

## 2024-08-14 PROCEDURE — 6370000000 HC RX 637 (ALT 250 FOR IP): Performed by: CLINICAL NURSE SPECIALIST

## 2024-08-14 PROCEDURE — 36430 TRANSFUSION BLD/BLD COMPNT: CPT

## 2024-08-14 PROCEDURE — 99222 1ST HOSP IP/OBS MODERATE 55: CPT | Performed by: NURSE PRACTITIONER

## 2024-08-14 PROCEDURE — 99232 SBSQ HOSP IP/OBS MODERATE 35: CPT | Performed by: INTERNAL MEDICINE

## 2024-08-14 PROCEDURE — 2060000000 HC ICU INTERMEDIATE R&B

## 2024-08-14 PROCEDURE — 86901 BLOOD TYPING SEROLOGIC RH(D): CPT

## 2024-08-14 PROCEDURE — 51702 INSERT TEMP BLADDER CATH: CPT

## 2024-08-14 PROCEDURE — 36415 COLL VENOUS BLD VENIPUNCTURE: CPT

## 2024-08-14 RX ORDER — POLYETHYLENE GLYCOL 3350 17 G/17G
17 POWDER, FOR SOLUTION ORAL 2 TIMES DAILY
Status: DISCONTINUED | OUTPATIENT
Start: 2024-08-14 | End: 2024-08-17 | Stop reason: HOSPADM

## 2024-08-14 RX ORDER — POTASSIUM CHLORIDE 1500 MG/1
20 TABLET, EXTENDED RELEASE ORAL ONCE
Status: COMPLETED | OUTPATIENT
Start: 2024-08-14 | End: 2024-08-14

## 2024-08-14 RX ORDER — POTASSIUM CHLORIDE 1500 MG/1
40 TABLET, EXTENDED RELEASE ORAL ONCE
Status: DISCONTINUED | OUTPATIENT
Start: 2024-08-14 | End: 2024-08-14

## 2024-08-14 RX ORDER — SODIUM CHLORIDE 9 MG/ML
INJECTION, SOLUTION INTRAVENOUS PRN
Status: DISCONTINUED | OUTPATIENT
Start: 2024-08-14 | End: 2024-08-17 | Stop reason: HOSPADM

## 2024-08-14 RX ORDER — BUMETANIDE 1 MG/1
2 TABLET ORAL DAILY
Status: DISCONTINUED | OUTPATIENT
Start: 2024-08-14 | End: 2024-08-17 | Stop reason: HOSPADM

## 2024-08-14 RX ADMIN — PANTOPRAZOLE SODIUM 40 MG: 40 TABLET, DELAYED RELEASE ORAL at 14:46

## 2024-08-14 RX ADMIN — METOPROLOL SUCCINATE 25 MG: 25 TABLET, EXTENDED RELEASE ORAL at 20:02

## 2024-08-14 RX ADMIN — OXYCODONE HYDROCHLORIDE 10 MG: 5 TABLET ORAL at 12:57

## 2024-08-14 RX ADMIN — MIDODRINE HYDROCHLORIDE 5 MG: 5 TABLET ORAL at 09:37

## 2024-08-14 RX ADMIN — ASPIRIN 81 MG CHEWABLE TABLET 81 MG: 81 TABLET CHEWABLE at 09:37

## 2024-08-14 RX ADMIN — BUSPIRONE HYDROCHLORIDE 5 MG: 5 TABLET ORAL at 20:02

## 2024-08-14 RX ADMIN — ATORVASTATIN CALCIUM 20 MG: 20 TABLET, FILM COATED ORAL at 20:02

## 2024-08-14 RX ADMIN — FOLIC ACID 1 MG: 1 TABLET ORAL at 09:37

## 2024-08-14 RX ADMIN — CEPHALEXIN 500 MG: 500 CAPSULE ORAL at 09:37

## 2024-08-14 RX ADMIN — MIDODRINE HYDROCHLORIDE 5 MG: 5 TABLET ORAL at 17:36

## 2024-08-14 RX ADMIN — POTASSIUM CHLORIDE 20 MEQ: 1500 TABLET, EXTENDED RELEASE ORAL at 12:57

## 2024-08-14 RX ADMIN — METHOCARBAMOL TABLETS 750 MG: 750 TABLET, COATED ORAL at 17:36

## 2024-08-14 RX ADMIN — Medication 100 MG: at 09:37

## 2024-08-14 RX ADMIN — MIDODRINE HYDROCHLORIDE 5 MG: 5 TABLET ORAL at 12:56

## 2024-08-14 RX ADMIN — PANTOPRAZOLE SODIUM 40 MG: 40 TABLET, DELAYED RELEASE ORAL at 06:48

## 2024-08-14 RX ADMIN — SODIUM CHLORIDE, PRESERVATIVE FREE 10 ML: 5 INJECTION INTRAVENOUS at 20:04

## 2024-08-14 RX ADMIN — METOPROLOL SUCCINATE 25 MG: 25 TABLET, EXTENDED RELEASE ORAL at 09:37

## 2024-08-14 RX ADMIN — SODIUM CHLORIDE, PRESERVATIVE FREE 10 ML: 5 INJECTION INTRAVENOUS at 11:00

## 2024-08-14 RX ADMIN — RIFAXIMIN 550 MG: 550 TABLET ORAL at 20:02

## 2024-08-14 RX ADMIN — METHOCARBAMOL TABLETS 750 MG: 750 TABLET, COATED ORAL at 20:02

## 2024-08-14 RX ADMIN — BUMETANIDE 2 MG: 1 TABLET ORAL at 12:56

## 2024-08-14 RX ADMIN — BUSPIRONE HYDROCHLORIDE 5 MG: 5 TABLET ORAL at 12:57

## 2024-08-14 RX ADMIN — TAMSULOSIN HYDROCHLORIDE 0.4 MG: 0.4 CAPSULE ORAL at 20:02

## 2024-08-14 RX ADMIN — BUSPIRONE HYDROCHLORIDE 5 MG: 5 TABLET ORAL at 09:37

## 2024-08-14 RX ADMIN — LACTULOSE 20 G: 10 SOLUTION ORAL at 11:00

## 2024-08-14 RX ADMIN — ISOSORBIDE DINITRATE 10 MG: 10 TABLET ORAL at 12:57

## 2024-08-14 RX ADMIN — RIFAXIMIN 550 MG: 550 TABLET ORAL at 09:37

## 2024-08-14 RX ADMIN — METHOCARBAMOL TABLETS 750 MG: 750 TABLET, COATED ORAL at 09:37

## 2024-08-14 RX ADMIN — Medication 1000 UNITS: at 09:37

## 2024-08-14 RX ADMIN — POLYETHYLENE GLYCOL 3350 17 G: 17 POWDER, FOR SOLUTION ORAL at 20:03

## 2024-08-14 RX ADMIN — MIRTAZAPINE 7.5 MG: 15 TABLET, FILM COATED ORAL at 20:03

## 2024-08-14 RX ADMIN — ISOSORBIDE DINITRATE 10 MG: 10 TABLET ORAL at 20:02

## 2024-08-14 RX ADMIN — METHOCARBAMOL TABLETS 750 MG: 750 TABLET, COATED ORAL at 14:46

## 2024-08-14 NOTE — PROGRESS NOTES
Infectious Disease  Progress Note  [facility]    Chief Complaint: right leg redness.     Subjective: Patient resting in bed no nausea vomiting or diarrhea. He is doing ok.  Had [place] catheter placed with bloody urine.    Scheduled Meds:   bumetanide  2 mg Oral Daily    polyethylene glycol  17 g Oral BID    tamsulosin  0.4 mg Oral Nightly    [Held by provider] metOLazone  5 mg Oral Daily    warfarin placeholder: dosing by pharmacy   Other RX Placeholder    methocarbamol  750 mg Oral 4x Daily    aspirin  81 mg Oral Daily    atorvastatin  20 mg Oral Nightly    metoprolol succinate  25 mg Oral BID    Vitamin D  1,000 Units Oral Daily    pantoprazole  40 mg Oral BID AC    lactulose  20 g Oral Daily    busPIRone  5 mg Oral TID    folic acid  1 mg Oral Daily    [Held by provider] hydrALAZINE  10 mg Oral 3 times per day    isosorbide dinitrate  10 mg Oral TID    midodrine  5 mg Oral TID WC    mirtazapine  7.5 mg Oral Nightly    nicotine  1 patch TransDERmal Daily    thiamine  100 mg Oral Daily    rifAXIMin  550 mg Oral BID    sodium chloride flush  5-40 mL IntraVENous 2 times per day     Continuous Infusions:   sodium chloride      sodium chloride      sodium chloride       PRN Meds:sodium chloride, oxyCODONE, oxyCODONE, sodium chloride, sodium chloride flush, sodium chloride, polyethylene glycol, prochlorperazine **OR** prochlorperazine    Prior to Admission medications    Medication Sig Start Date End Date Taking? Authorizing Provider   warfarin (COUMADIN) 3 MG tablet Take 1 tablet by mouth daily  Patient taking differently: Take 4 mg by mouth daily 5/19/24   Uzma Burgos MD   busPIRone (BUSPAR) 5 MG tablet Take 1 tablet by mouth 3 times daily 5/19/24   Uzma Burgos MD   folic acid (FOLVITE) 1 MG tablet Take 1 tablet by mouth daily 5/20/24   Uzma Burgos MD   hydrALAZINE (APRESOLINE) 10 MG tablet Take 1 tablet by mouth every 8 hours 5/19/24   Uzma Burgos MD   isosorbide dinitrate (ISORDIL) 10 MG  Respirations even and nonlabored.  Abdomen- Soft, bowel sounds positive, non tender  Musculo/Extremities-  right leg redness is significantly better.  Minimal swelling.    Neurological- No focal motor or sensory loss.  No confusion  Skin:  Warm and dry, free from rashes.  Left sided chest swelling.  Lines: PIV    Labs, Cultures reviewed  Radiology and other consultants notes reviewed    Microbiology:  Blood cultures 8/7 negative    Assessment:  Right leg cellulitis: Resolved  Left breast swelling: needs to be worked up outpatient.  FRANTZ on CKD     Plan:    Completed Keflex today.    ID signs off please call me if required.    Electronically signed by DAT ARANA MD on 8/14/2024 at 2:26 PM

## 2024-08-14 NOTE — PROGRESS NOTES
Call pending to patient's domestic partner, Leonie, to obtain phone consent for blood transfusion.

## 2024-08-14 NOTE — PROGRESS NOTES
[facility] Hospitalist   Progress Note    Admitting Date and Time: 8/2/2024 12:11 PM  Admit Dx: Anemia [D64.9]  Anemia, unspecified type [D64.9]    Subjective:    Patient was admitted with Anemia [D64.9]  Anemia, unspecified type [D64.9]. Patient denies fever, chills, cp, sob, n/v.     bumetanide  2 mg Oral Daily    polyethylene glycol  17 g Oral BID    tamsulosin  0.4 mg Oral Nightly    [Held by provider] metOLazone  5 mg Oral Daily    warfarin placeholder: dosing by pharmacy   Other RX Placeholder    methocarbamol  750 mg Oral 4x Daily    aspirin  81 mg Oral Daily    atorvastatin  20 mg Oral Nightly    metoprolol succinate  25 mg Oral BID    Vitamin D  1,000 Units Oral Daily    pantoprazole  40 mg Oral BID AC    lactulose  20 g Oral Daily    busPIRone  5 mg Oral TID    folic acid  1 mg Oral Daily    [Held by provider] hydrALAZINE  10 mg Oral 3 times per day    isosorbide dinitrate  10 mg Oral TID    midodrine  5 mg Oral TID WC    mirtazapine  7.5 mg Oral Nightly    nicotine  1 patch TransDERmal Daily    thiamine  100 mg Oral Daily    rifAXIMin  550 mg Oral BID    sodium chloride flush  5-40 mL IntraVENous 2 times per day     sodium chloride, , PRN  oxyCODONE, 5 mg, Q6H PRN  oxyCODONE, 10 mg, Q6H PRN  sodium chloride, , PRN  sodium chloride flush, 5-40 mL, PRN  sodium chloride, , PRN  polyethylene glycol, 17 g, Daily PRN  prochlorperazine, 10 mg, Q8H PRN   Or  prochlorperazine, 10 mg, Q6H PRN         Objective:    BP (!) 119/59   Pulse 93   Temp 99.5 °F (37.5 °C) (Axillary)   Resp 18   Ht 1.778 m (5' 10\")   Wt 95 kg (209 lb 8 oz)   SpO2 100%   BMI 30.06 kg/m²   Skin: warm and dry, no rash or erythema  Pulmonary/Chest: clear to auscultation bilaterally- no wheezes, rales or rhonchi, normal air movement, no respiratory distress  Cardiovascular: rhythm reg at rate of 92  Abdomen: soft, non-tender, non-distended, normal bowel sounds, no masses or organomegaly  Extremities: no cyanosis, no  evidence of DVT in the right lower extremity.         XR CHEST PORTABLE   Final Result   Cardiomegaly with underlying chronic interstitial changes seen in the lung   fields. No new focal parenchymal opacification present.         US BREAST LIMITED LEFT    (Results Pending)       Assessment:    Principal Problem:    Anemia  Active Problems:    H/O mitral valve replacement with mechanical valve    H/O mechanical aortic valve replacement    Supratherapeutic INR    AV block    Acute blood loss anemia    Heart failure with mid-range ejection fraction (HFmEF) (HCC)    Acute on chronic combined systolic and diastolic congestive heart failure (HCC)    Normally functioning cardiac pacemaker present    Hepatic cirrhosis (HCC)    Acute on chronic systolic (congestive) heart failure (HCC)  Resolved Problems:    * No resolved hospital problems. *      Plan:  Acute on chronic systolic chf pt off bumex gtt continue medication  cardio following  Acidosis monitor  Varinder  bumex gtt stopped monitor on iv fluids  renal following  Hypomagnesemia monitor and replace prn  Anemia monitor s/p transfusion hematology on consult.  Cirrhosis with hepatic encephalopathy likely due to alcohol abuse  continue lactulose/rifaximin  continue thiaimine.   Hyperlipidemia continue statin  Htn continue med  RLE cellulitis continue abx  NSVT pt had 19 beats of vtach per nursing. Replace K and monitor mag and replace prn. Cardio on consult  Hypokalemia monitor and replace prn  Elevated inr pt given vit k continue to monitor. Pt not actively bleeding  Thrombocytopenia monitor    Asked pharmacy to get further input with cardiology on anticoagulation given hematuria from recent traumatic marks removal and in light of his elevated inr from his liver disease.    Once again approached pt to f/u on his goals of care and how he wants to proceed. Pt wants to keep on going. Reminded pt if this is to be the case then he needs to comply with his medical care.      Pt

## 2024-08-14 NOTE — PROGRESS NOTES
Department of Internal Medicine  Nephrology Progress Note    Events reviewed.    SUBJECTIVE:   We are following for FRANTZ and fluid overload. Patient reports no new complaints.     PHYSICAL EXAM:      Vitals:    VITALS:  BP (!) 97/56   Pulse 92   Temp 97.8 °F (36.6 °C) (Oral)   Resp 18   Ht 1.778 m (5' 10\")   Wt 95 kg (209 lb 8 oz)   SpO2 100%   BMI 30.06 kg/m²   24HR BLOOD PRESSURE RANGE:  Systolic (24hrs), Av , Min:89 , Max:110   ; Diastolic (24hrs), Av, Min:41, Max:80    24HR INTAKE/OUTPUT:    Intake/Output Summary (Last 24 hours) at 2024 0847  Last data filed at 2024 0547  Gross per 24 hour   Intake 780 ml   Output 2950 ml   Net -2170 ml       Constitutional:  awake NAD  HEENT:  EOMI  Respiratory:  CTA  Cardiovascular/Edema:  S1 S2  Gastrointestinal:  +BS  Neurologic:  no deficits  Skin:  warm dry  Other:  BLE ++edema     DATA:    CBC with Differential:    Lab Results   Component Value Date/Time    WBC 6.8 2024 05:19 AM    RBC 2.71 2024 05:19 AM    HGB 6.6 2024 05:19 AM    HCT 22.7 2024 05:19 AM     2024 07:30 AM    MCV 83.8 2024 05:19 AM    MCH 24.4 2024 05:19 AM    MCHC 29.1 2024 05:19 AM    RDW 22.5 2024 05:19 AM    NRBC PENDING 2024 05:19 AM    METASPCT PENDING 2024 05:19 AM    LYMPHOPCT PENDING 2024 05:19 AM    PROMYELOPCT PENDING 2024 05:19 AM    MONOPCT PENDING 2024 05:19 AM    MYELOPCT PENDING 2024 05:19 AM    EOSPCT PENDING 2024 05:19 AM    BASOPCT PENDING 2024 05:19 AM    MONOSABS PENDING 2024 05:19 AM    LYMPHSABS PENDING 2024 05:19 AM    EOSABS PENDING 2024 05:19 AM    BASOSABS PENDING 2024 05:19 AM     CMP:    Lab Results   Component Value Date/Time     2024 05:19 AM    K 3.4 2024 05:19 AM    K 3.9 09/15/2022 03:08 PM     2024 05:19 AM    CO2 22 2024 05:19 AM    BUN 76 2024 05:19 AM    CREATININE 3.0

## 2024-08-14 NOTE — PROGRESS NOTES
Bed alarm heard and patient noted to be up out of bed in hallway. Coude cath detached from bag and blood stream noted from bed to hallway. Patient assisted to toilet. Cleaned and assisted back to bed. Cath attachment replaced with new bag and irrigated. Bed alarm reactivated.

## 2024-08-14 NOTE — PROGRESS NOTES
Patient signed his own consent for \"Esophagogastroduodenoscopy\" per endo procedure order.  Patient A&O x4 at the time of signature.

## 2024-08-14 NOTE — ACP (ADVANCE CARE PLANNING)
Advance Care Planning     Palliative Team Advance Care Planning (ACP) Conversation    Date of Conversation: 08/14/24    Individuals present for the conversation: Legal healthcare agent named below     ACP documents on file prior to discussion:  -None    Previously completed document/s not on file: Not discussed.    Healthcare Decision Maker:    Primary Decision Maker: Leonie Moreland - Domestic Partner - [phone]     Conversation Summary:  LSW and NP verified HCPOA linked in care everywhere which appoints Leonie Moreland as his agent. AVISW also spoke with Leonie and requested copy . She is not sure where the original is but will try to locate it.     Resuscitation Status:   Code Status: Full Code     Documentation Completed:  -No new documents completed.    I spent 25 minutes with the patient and/or surrogate decision maker discussing the patient's wishes and goals.      ANJEL Chou

## 2024-08-14 NOTE — CARE COORDINATION
Met with patient. Urology, Cardiology, GI, Nephrology are following. Hgb 6.6 pt. Received 1 unit PRBC's. BP's running low, bumex, hydralazine, zaroxolyn on hold. Continue marks catheter for urinary retention per Urology. Right leg cellulitis PO keflex. Anticipated to discharge home. John Hadley is HCPOA. Will continue to follow.   Deja ELENAN, RN  Case Management

## 2024-08-14 NOTE — PROGRESS NOTES
Patient woke up from nap oriented x4.  Eating lunch.  Updated on today's events which he does have some memory of.

## 2024-08-14 NOTE — PROGRESS NOTES
Pt exhibiting bizarre behavior.    Patient has blank stare  Sitting on bedside with table in front of him.  Patient gestures as if he is self-administering a cup of pills, however there is no cup of pills bc he already took them.  He also keeps picking things up off his table and setting them back down but doesn't know why.    RN ambulates patient to bathroom but, instead of entering, he closes the door to the bathroom to walk past it.  When RN opens the door again and redirects him to the bathroom he stands there and stares until RN points specifically to the toilet and instructs him to walk to it.  When he arrives at toilet, RN instructs him to \"turn to me\" in front of him.  He turns to the side and needs told \"turn more\" every step.    After returning patient to bed, RN returns to bathroom to clean up blood drips and patient states that he keeps thinking there is a prosthetic leg in the bathroom.  RN asks \"is it my leg?\" And he said \"no just something I'm imagining\".    When RN is obtaining a blood specimen from his IV in his right arm, he keeps trying to use that same arm to grab things off his tray.  RN encourages him to use his other arm.  He continues using right arm.

## 2024-08-14 NOTE — PROGRESS NOTES
Pharmacy Consultation Note  (Warfarin Dosing and Monitoring)    Initial consult date: 8/2  Consulting Provider: Latanya Dorsey is a 59 y.o. male for whom pharmacy has been asked to manage warfarin therapy.     Weight:   Wt Readings from Last 1 Encounters:   08/14/24 95 kg (209 lb 8 oz)       TSH:    Lab Results   Component Value Date/Time    TSH 0.95 05/08/2024 02:24 AM       Hepatic Function Panel:                            Lab Results   Component Value Date/Time    ALKPHOS 116 08/11/2024 03:10 AM    ALT <5 08/11/2024 03:10 AM    AST 22 08/11/2024 03:10 AM    BILITOT 0.5 08/11/2024 03:10 AM    BILIDIR 0.3 08/11/2024 03:10 AM    IBILI 0.2 08/11/2024 03:10 AM         Recent Labs     08/13/24  0348 08/13/24  2120 08/14/24  0519   HGB 7.3* 7.5* 6.6*     Date Warfarin Dose INR Heparin or LMWH Comment   8/3 NO DOSE 5 Lovenox DC'd    8/4 NO DOSE 4 --    8/5 4 mg 3.1 --    8/6 6 mg 2.6 --    8/7 6 mg 2.6 --    8/8 2 mg 3.7 --    8/9 NO DOSE 6.0 --    8/10 NO DOSE 9.5 -- Vit K 5mg x2 PO   8/11 NO DOSE >10    -- Vitamin K 5 mg po x 1 (0520); Vitamin K 10 mg po x 1 (1229)   8/12 NO DOSE 6.1 --    8/13 1 mg - NOT GIVEN 3.7     8/14 NO DOSE 3.0              Assessment:  Patient is a 59 y.o. male on warfarin for Atrial Fibrillation, Mechanical Heart Valve (mitral), and Mechanical Heart Valve (atrial).  Patient's home warfarin dosing regimen is 4mg daily per med rec but patient filled 3mg tabs most recently. Per cardiology, the patient is unable to have his INR checked with appointments.    INR possibly elevated in setting of liver dysfunction.  Goal INR 2.5 - 3.5  Patient did not receive warfarin on 8/13.  Patient ripped out his Culver yesterday; I spoke with Dr. Harvey who said we will hold off on any warfarin tonight.    Plan:  No warfarin tonight  Daily PT/INR until the INR is stable within the therapeutic range.  Pharmacist will closely follow and monitor/adjust warfarin dosing as necessary.    Armaan Antunez,,

## 2024-08-14 NOTE — PROGRESS NOTES
8/14/2024 7:11 AM  Service: Urology  Group: [facility] (Conor/Briseida)    Len Dorsey  [id number]    Subjective:  temp 97 patient had malposition Culver with gross hematuria this will have to change.    Review of Systems  Respiratory: negative  Cardiovascular: negative  Gastrointestinal: negative  Hematologic/lymphatic: negative  Musculoskeletal:negative  Neurological: negative  Endocrine: negative    Scheduled Meds:   cephALEXin  500 mg Oral 2 times per day    [Held by provider] bumetanide  2 mg Oral BID    tamsulosin  0.4 mg Oral Nightly    [Held by provider] metOLazone  5 mg Oral Daily    warfarin placeholder: dosing by pharmacy   Other RX Placeholder    methocarbamol  750 mg Oral 4x Daily    aspirin  81 mg Oral Daily    atorvastatin  20 mg Oral Nightly    metoprolol succinate  25 mg Oral BID    Vitamin D  1,000 Units Oral Daily    pantoprazole  40 mg Oral BID AC    lactulose  20 g Oral Daily    busPIRone  5 mg Oral TID    folic acid  1 mg Oral Daily    [Held by provider] hydrALAZINE  10 mg Oral 3 times per day    isosorbide dinitrate  10 mg Oral TID    midodrine  5 mg Oral TID WC    mirtazapine  7.5 mg Oral Nightly    nicotine  1 patch TransDERmal Daily    thiamine  100 mg Oral Daily    rifAXIMin  550 mg Oral BID    sodium chloride flush  5-40 mL IntraVENous 2 times per day       Objective:  Vitals:    08/14/24 0640   BP: (!) 97/56   Pulse: 92   Resp: 18   Temp: 97.8 °F (36.6 °C)   SpO2:          Allergies: Patient has no known allergies.    General Appearance: alert and oriented to person, place and time and in no acute distress  Skin: no rash or erythema  Head: normocephalic and atraumatic  Pulmonary/Chest: clear to auscultation bilaterally- no wheezes, rales or rhonchi, normal air movement, no respiratory distress and no chest wall tenderness  Abdomen: soft, non-tender, non-distended, normal bowel sounds, no masses or organomegaly and no inguinal adenopathy  Genitalia: No penile or scrotal swelling or  masses. Extremities: no cyanosis, clubbing or edema and Aliya's sign negative bilaterally      Culver    Labs:     Recent Labs     08/14/24  0519      K 3.4*      CO2 22   BUN 76*   CREATININE 3.0*   GLUCOSE 98   CALCIUM 8.9       Lab Results   Component Value Date/Time    HGB 6.6 08/14/2024 05:19 AM    HCT 22.7 08/14/2024 05:19 AM         Assessment:  Len Chandlerjeannesylwias 59 y.o. male     Principal Problem:    Anemia  Active Problems:    H/O mitral valve replacement with mechanical valve    H/O mechanical aortic valve replacement    Supratherapeutic INR    AV block    Acute blood loss anemia    Heart failure with mid-range ejection fraction (HFmEF) (HCC)    Acute on chronic combined systolic and diastolic congestive heart failure (HCC)    Normally functioning cardiac pacemaker present    Hepatic cirrhosis (HCC)    Acute on chronic systolic (congestive) heart failure (HCC)  Resolved Problems:    * No resolved hospital problems. *        His hemoglobin is 6.6 platelets are 94,000.  His creatinine has improved from 5 to about 3.  His BUN is gone from 89 to 76      Plan:    Will remove the 16 [place] catheter which is not providing adequate drainage will continue with a Simplastic three-way catheter with continuous irrigation at some point he may need cystoscopy to assess the cause of the bleeding though this could be traumatic at this point.  He may need further hematologic evaluation for his thrombocytopenia and his anemia.  He has had a supratherapeutic INR which may be causing his problem    Jama Perdomo MD   [facility]  Urology

## 2024-08-14 NOTE — PROGRESS NOTES
RN found collection hat on the floor behind the toilet with a BM in it.  It does appear dark.  RN did collect a specimen from this and send it for occult stool.  H-H also collected and sent

## 2024-08-14 NOTE — CONSULTS
Palliative Care Department  [phone]  Palliative Care Initial Consult  Provider ARNALDO Wagner CNP      PATIENT: Len Dorsey  : 1965  MRN: [id number]  ADMISSION DATE: 2024 12:11 PM  Referring Provider: Nikhil Presotn DO     Palliative Medicine was consulted on hospital day 12 for assistance with Goals of care, Code Status Discussion, Family support, Symptom management     HPI:     Clinical Summary:Len Dorsey is a 59 y.o. y/o male with a history of alcoholic liver disease, aortic valve replacement, mitral valve replacement, HFrEF 40-45%, stroke, testicular ca s/p chemotherapy, CAD s/p CABG  who presented to [facility] on 2024 with shortness of.  Blood work was significant for a hemoglobin of 6.9, creatinine 1.8, BNP 1489, INR 5.0.  Is on Coumadin for mechanical valve.  GI is following, but no plans for scopes.  Urology has been following for hematuria due to patient removing Culver.  He remains admitted to telemetry for further medical management.    ASSESSMENT/PLAN:     Pertinent Hospital Diagnoses     Acute blood loss anemia  Alcoholic liver disease  HFrEF  Acute on chronic kidney disease    Palliative Care Encounter / Counseling Regarding Goals of Care  Please see detailed goals of care discussion as below  At this time, Len Dorsey, Does Not have capacity for medical decision-making.  Capacity is time limited and situation/question specific  Outcome of goals of care meeting:  Continue all current medical management  Code status Full Code  Advanced Directives: HPOA in soft chart  Surrogate/Legal NOK:  Leonie Moreland - Domestic Partner/ HPOA - [phone]     Spiritual assessment: no spiritual distress identified  Bereavement and grief: to be determined  Referrals to: none today    Thank you for the opportunity to participate in the care of Len Dorsey.     ARNALDO Wagner CNP   Palliative Medicine     SUBJECTIVE:     Details of Conversation: Chart  chart    Time/Communication  Greater than 50% of time spent, total 55 minutes in counseling and coordination of care at the bedside regarding goals of care.    Thank you for allowing Palliative Medicine to participate in the care of Len Dorsey.    Note: This report was completed using computerJaco Solarsi voiced recognition software.  Every effort has been made to ensure accuracy; however, inadvertent computerized transcription errors may be present.

## 2024-08-14 NOTE — PROGRESS NOTES
PROGRESS NOTE  By Federico Riddle M.D.    [facility]  Dr. Tejinder Subramanian M.D.,  Dr. Roly Pinzon M.D.,   Dr. Osmany Rdz D.O.,  Dr. Lance Lassiter M.D.,  MAYRA HooperO.,          Len Sunita  59 y.o.  male    SUBJECTIVE:  Denies abdominal pain.  Denies nausea vomiting.  Denies hematemesis emesis of coffee-ground material..  Reports no bowel movement and reports no rectal bleeding or dark/tarry stools.    OBJECTIVE:    BP (!) 119/59   Pulse 93   Temp 99.5 °F (37.5 °C) (Axillary)   Resp 18   Ht 1.778 m (5' 10\")   Wt 95 kg (209 lb 8 oz)   SpO2 100%   BMI 30.06 kg/m²     General: NAD/adult  male.  AAOx3  HEENT: Anicteric sclera/moist oral mucosa  Neck: Supple with trachea midline  Chest: Symmetric excursion/normal respirations  Cor: Regular/S1-S2  Abd.: Soft and obese.  Nontender  Extr.:  No significant peripheral edema.  No cyanosis  Skin: Warm and dry/anicteric      DATA:    Monitor data reviewed -paced noted.       Lab Results   Component Value Date/Time    WBC 6.8 08/14/2024 05:19 AM    RBC 2.71 08/14/2024 05:19 AM    HGB 6.6 08/14/2024 05:19 AM    HCT 22.7 08/14/2024 05:19 AM    MCV 83.8 08/14/2024 05:19 AM    MCH 24.4 08/14/2024 05:19 AM    MCHC 29.1 08/14/2024 05:19 AM    RDW 22.5 08/14/2024 05:19 AM     08/05/2024 07:30 AM    MPV Can not be calculated 08/14/2024 05:19 AM     Lab Results   Component Value Date/Time     08/14/2024 05:19 AM    K 3.4 08/14/2024 05:19 AM    K 3.9 09/15/2022 03:08 PM     08/14/2024 05:19 AM    CO2 22 08/14/2024 05:19 AM    BUN 76 08/14/2024 05:19 AM    CREATININE 3.0 08/14/2024 05:19 AM    CALCIUM 8.9 08/14/2024 05:19 AM    BILITOT 0.5 08/11/2024 03:10 AM    ALKPHOS 116 08/11/2024 03:10 AM    AST 22 08/11/2024 03:10 AM    ALT <5 08/11/2024 03:10 AM     Lab Results   Component Value Date/Time    LIPASE 32 08/02/2024 12:35 PM     No results found for: \"AMYLASE\"      ASSESSMENT/PLAN:  Patient Active Problem List

## 2024-08-14 NOTE — PROGRESS NOTES
INPATIENT CARDIOLOGY FOLLOW-UP    Name: Len Dorsey    Age: 59 y.o.    Date of Admission: 8/2/2024 12:11 PM    Date of Service: 8/14/2024    Primary Cardiologist: Akash    Chief Complaint: Follow-up for CHF    Interim History:  Apparently inadvertent traumatic Culver removal yesterday, replaced by urology.  Having some hematuria.  Denies chest pain or shortness of breath.  Per nursing notes having intermittent confusion and odd behavior.    Net -22 L    Review of Systems:   Negative except as described above    Problem List:  Patient Active Problem List   Diagnosis    Chest pain    Closed fracture of nasal bones    Injury of face    Retroperitoneal hematoma    Vitamin D deficiency    Acute chest pain    Acute cholecystitis    Gastrointestinal hemorrhage    Polyp of colon    Right upper quadrant abdominal pain    Diarrhea    Current moderate episode of major depressive disorder without prior episode (HCC)    Hypertension    H/O mitral valve replacement with mechanical valve    H/O mechanical aortic valve replacement    Coronary artery disease involving native coronary artery of native heart without angina pectoris    Paravalvular leak (prosthetic valve)    Hx of CABG    Warfarin anticoagulation    Supratherapeutic INR    Moderate obesity    Dyspnea    Alcohol withdrawal syndrome without complication (HCC)    DTs (delirium tremens) (HCC)    Anemia    Severe protein-calorie malnutrition (HCC)    Acute decompensated heart failure (HCC)    Aortic valve disease    Mitral valve disease    AV block    Pure hypercholesterolemia    Chronic obstructive pulmonary disease (HCC)    Loculated pleural effusion    Troponin level elevated    Stage 2 chronic kidney disease    Alcohol use    Breast swelling    Pleural effusion    Hypophosphatemia    Acute congestive heart failure (HCC)    FRANTZ (acute kidney injury) (HCC)    Acidosis    Hyperglycemia    Hyperlipidemia    Alcoholic liver disease, unspecified (HCC)     calculated     No results found for: \"CKTOTAL\", \"CKMB\", \"CKMBINDEX\", \"TROPONINI\"  Lab Results   Component Value Date    INR 3.0 2024    INR 3.7 2024    INR 6.1 (HH) 2024    PROTIME 31.6 (H) 2024    PROTIME 39.5 (H) 2024    PROTIME 66.5 (H) 2024     Lab Results   Component Value Date    TSH 0.95 2024     Lab Results   Component Value Date    LABA1C 4.9 2024     No results found for: \"EAG\"  Lab Results   Component Value Date    CHOL 122 2024    CHOL 134 2023    CHOL 86 2022     Lab Results   Component Value Date    TRIG 70 2024    TRIG 146 2023    TRIG 149 2022     Lab Results   Component Value Date    HDL 48 2024    HDL 33 2023    HDL 28 2022     No components found for: \"LDLCALC\", \"LDLCHOLESTEROL\"  Lab Results   Component Value Date    VLDL 14 2024    VLDL 29 2023    VLDL 30 2022     No results found for: \"CHOLHDLRATIO\"  No results for input(s): \"PROBNP\" in the last 72 hours.      Cardiac Tests:    EK2024: Ventricular pacing 92 bpm, underlying rhythm unclear due to artifact    Telemetry: Ventricular pacing    Chest X-ray:     Impression:        Cardiomegaly with underlying chronic interstitial changes seen in the lung  fields. No new focal parenchymal opacification present.       Echocardiogram:   TTE 5/10/24 KA    Left Ventricle: The EF by visual approximation is 40 - 45%. Findings consistent with moderate concentric hypertrophy. Elevated left ventricular filling pressure.    Right Ventricle: Right ventricle size is normal. Reduced systolic function.    Aortic Valve: Not well visualized. Mechanical valve. AV mean gradient is 12 mmHg. LVOT:AV VTI Index is 0.34. AV area by continuity VTI is 1.1 cm2. AV area by peak velocity is 1.0 cm2.    Mitral Valve: Not well visualized. Mechanical valve. MV mean gradient is 4 mmHg.    Tricuspid Valve: Moderate regurgitation.    Pulmonic Valve: The  pulmonic valve was not well visualized.    Left Atrium: Left atrium is severely dilated.    Image quality is technically difficult. Contrast used: Definity. Technically difficult study with poor endocardial visualization, technically difficult study due to patient's body habitus and procedure performed with the patient in a supine position.     Stress test:      Cardiac catheterization:   [facility] 3/2023  CONCLUSIONS:  1. Single vessel disease.  2. Patent LIMA to LAD.     Device interrogation 8/13/2024  10.4 years battery life remaining  DDD LRL 60 bpm  0% AT/AF burden  94.1% ventricular pacing  2.1% atrial pacing    ----------------------------------------------------------------------------------------------------------------------------------------------------------------  IMPRESSION:  Acute on chronic heart failure with mildly reduced EF proBNP 1500 net -22 L  Cardiomyopathy EF 40-45%  Redo mechanical AVR/MVR 3/2023 2/2 endocarditis with perivalvular mitral regurgitation  Supratherapeutic INR >10 -> 3.0 s/p vitamin K, does not monitor INR's. No warfarin given since 8/8  Complete heart block PPM  CAD LIMA to LAD original AVR/MVR surgery 2020  Moderate TR  Acute on chronic anemia Hgb 6.9 initially.  Was transfused.  7.3->6.6  Worsening thrombocytopenia 90s  FRANTZ/CKD creatinine 1.6-5.2.  3.0 today.    Hypokalemia  Left-sided tender gynecomastia etiology unclear; US 2/2024 showing edema.  Improved  Right leg cellulitis  Hypertension, running low, on midodrine  Traumatic Culver removal, hematuria  Hyperlipidemia  Tobacco abuse/COPD  Alcoholic liver disease: Decompensated cirrhosis  Encephalopathy  Anxiety/depression  Obesity BMI 37 kg/m²  Noncompliance/social/financial/transportation issues    RECOMMENDATIONS:  Worsening anemia noted.    Optimize GDMT as able  Continue metoprolol succinate 25 mg twice daily  Hold empagliflozin  Hold hydralazine/isosorbide dinitrate with continued episodes of hypotension  Diuretics held, off

## 2024-08-14 NOTE — PROGRESS NOTES
Patient repeatedly gets out of bed, setting off bed alarm.  Redirectable in the moment but doesn't stop the behavior.  Charge RN notified that patient will need to be moved closer to the nurses station when a room opens up

## 2024-08-14 NOTE — PROGRESS NOTES
Palliative Medicine Social Work     Patient Name: Len Dorsey    Age: 59 y.o.    Marital Status:      Status: no    Next of Kin:Primary Decision Maker: Leonie Moreland R - Domestic Partner - [phone]     Additional Support: no    Children: pt has an adopted daughter Tanvi with his ex wife. Per Leonie he has not seen her since she was 12 years old. Leonie does not Tanvi's last name or location. Pt only states it would be hard to get a hold of her.     Advanced Directives: verified HCPOA linked in care everywhere which appoints Leonie Lassiterstephyjoan as his agent.     Confirm Code Status: full    Current Goals of Care:     Mental Health History: depression    Substance Abuse:history of etoh abuse    Indications of Abuse/Neglect: no    Financial Concerns: pt is on long term disability and receives approximately $2300 a month. His fiance is also employed. However his fiance Leonie expresses that often pt will not get his prescriptions or go see the doctor because of the copays.  They have tried prescription assistance and other services but they were not useful she says.     Living Situation: Pt resides with his fiance and their dog. He is independent at home    Physical Care Needs Met: yes    Emotional Needs Met: Supportive listening provided to major Hadley who expressed her frustrations as a care giver and difficulties they have had since pt  has been unable to work for the past 2 years after his heart surgeries. She states sometimes he won't even leave the house to go for a walk.     Assessment: 60 yo male admitted from home  with a history of alcoholic liver disease, aortic valve replacement, mitral valve replacement, HFrEF 40-45%, stroke, testicular ca s/p chemotherapy, CAD s/p CABG  who presented to [facility] on 8/2/2024 with shortness of breath. Copy of HCPOA received.

## 2024-08-15 ENCOUNTER — HOSPITAL ENCOUNTER (OUTPATIENT)
Dept: OTHER | Age: 59
Discharge: HOME | End: 2024-08-15

## 2024-08-15 LAB
ABO/RH: NORMAL
ANION GAP SERPL CALCULATED.3IONS-SCNC: 12 MMOL/L (ref 7–16)
ANION GAP SERPL CALCULATED.3IONS-SCNC: 13 MMOL/L (ref 7–16)
ANTIBODY SCREEN: NEGATIVE
ARM BAND NUMBER: NORMAL
BASOPHILS # BLD: 0.05 K/UL (ref 0–0.2)
BASOPHILS NFR BLD: 1 % (ref 0–2)
BLOOD BANK BLOOD PRODUCT EXPIRATION DATE: NORMAL
BLOOD BANK DISPENSE STATUS: NORMAL
BLOOD BANK ISBT PRODUCT BLOOD TYPE: 9500
BLOOD BANK PRODUCT CODE: NORMAL
BLOOD BANK SAMPLE EXPIRATION: NORMAL
BLOOD BANK UNIT TYPE AND RH: NORMAL
BPU ID: NORMAL
BUN SERPL-MCNC: 60 MG/DL (ref 6–20)
BUN SERPL-MCNC: 65 MG/DL (ref 6–20)
CALCIUM SERPL-MCNC: 9.4 MG/DL (ref 8.6–10.2)
CALCIUM SERPL-MCNC: 9.6 MG/DL (ref 8.6–10.2)
CHLORIDE SERPL-SCNC: 106 MMOL/L (ref 98–107)
CHLORIDE SERPL-SCNC: 107 MMOL/L (ref 98–107)
CO2 SERPL-SCNC: 23 MMOL/L (ref 22–29)
CO2 SERPL-SCNC: 26 MMOL/L (ref 22–29)
COMPONENT: NORMAL
CREAT SERPL-MCNC: 2.4 MG/DL (ref 0.7–1.2)
CREAT SERPL-MCNC: 2.5 MG/DL (ref 0.7–1.2)
CROSSMATCH RESULT: NORMAL
EOSINOPHIL # BLD: 0.44 K/UL (ref 0.05–0.5)
EOSINOPHILS RELATIVE PERCENT: 7 % (ref 0–6)
ERYTHROCYTE [DISTWIDTH] IN BLOOD BY AUTOMATED COUNT: 22.1 % (ref 11.5–15)
GFR, ESTIMATED: 29 ML/MIN/1.73M2
GFR, ESTIMATED: 31 ML/MIN/1.73M2
GLUCOSE SERPL-MCNC: 106 MG/DL (ref 74–99)
GLUCOSE SERPL-MCNC: 110 MG/DL (ref 74–99)
HCT VFR BLD AUTO: 26.6 % (ref 37–54)
HCT VFR BLD AUTO: 27.4 % (ref 37–54)
HGB BLD-MCNC: 7.7 G/DL (ref 12.5–16.5)
HGB BLD-MCNC: 8 G/DL (ref 12.5–16.5)
IMM GRANULOCYTES # BLD AUTO: 0.03 K/UL (ref 0–0.58)
IMM GRANULOCYTES NFR BLD: 1 % (ref 0–5)
INR PPP: 3.4
LYMPHOCYTES NFR BLD: 0.91 K/UL (ref 1.5–4)
LYMPHOCYTES RELATIVE PERCENT: 14 % (ref 20–42)
MCH RBC QN AUTO: 24.8 PG (ref 26–35)
MCHC RBC AUTO-ENTMCNC: 28.9 G/DL (ref 32–34.5)
MCV RBC AUTO: 85.8 FL (ref 80–99.9)
MONOCYTES NFR BLD: 0.76 K/UL (ref 0.1–0.95)
MONOCYTES NFR BLD: 12 % (ref 2–12)
NEUTROPHILS NFR BLD: 66 % (ref 43–80)
NEUTS SEG NFR BLD: 4.21 K/UL (ref 1.8–7.3)
PLATELET CONFIRMATION: NORMAL
PLATELET, FLUORESCENCE: 99 K/UL (ref 130–450)
PMV BLD AUTO: ABNORMAL FL (ref 7–12)
POTASSIUM SERPL-SCNC: 3.4 MMOL/L (ref 3.5–5)
POTASSIUM SERPL-SCNC: 4 MMOL/L (ref 3.5–5)
PROTHROMBIN TIME: 36.4 SEC (ref 9.3–12.4)
RBC # BLD AUTO: 3.1 M/UL (ref 3.8–5.8)
SODIUM SERPL-SCNC: 143 MMOL/L (ref 132–146)
SODIUM SERPL-SCNC: 144 MMOL/L (ref 132–146)
TRANSFUSION STATUS: NORMAL
UNIT DIVISION: 0
UNIT ISSUE DATE/TIME: NORMAL
WBC # BLD: ABNORMAL 10*3/UL
WBC OTHER # BLD: 6.4 K/UL (ref 4.5–11.5)

## 2024-08-15 PROCEDURE — 85025 COMPLETE CBC W/AUTO DIFF WBC: CPT

## 2024-08-15 PROCEDURE — 99233 SBSQ HOSP IP/OBS HIGH 50: CPT | Performed by: INTERNAL MEDICINE

## 2024-08-15 PROCEDURE — 99232 SBSQ HOSP IP/OBS MODERATE 35: CPT | Performed by: INTERNAL MEDICINE

## 2024-08-15 PROCEDURE — 2060000000 HC ICU INTERMEDIATE R&B

## 2024-08-15 PROCEDURE — 80048 BASIC METABOLIC PNL TOTAL CA: CPT

## 2024-08-15 PROCEDURE — 6370000000 HC RX 637 (ALT 250 FOR IP): Performed by: INTERNAL MEDICINE

## 2024-08-15 PROCEDURE — 6370000000 HC RX 637 (ALT 250 FOR IP)

## 2024-08-15 PROCEDURE — 6370000000 HC RX 637 (ALT 250 FOR IP): Performed by: HOSPITALIST

## 2024-08-15 PROCEDURE — 97161 PT EVAL LOW COMPLEX 20 MIN: CPT

## 2024-08-15 PROCEDURE — 97165 OT EVAL LOW COMPLEX 30 MIN: CPT

## 2024-08-15 PROCEDURE — 36415 COLL VENOUS BLD VENIPUNCTURE: CPT

## 2024-08-15 PROCEDURE — 6370000000 HC RX 637 (ALT 250 FOR IP): Performed by: NURSE PRACTITIONER

## 2024-08-15 PROCEDURE — 85018 HEMOGLOBIN: CPT

## 2024-08-15 PROCEDURE — 2580000003 HC RX 258: Performed by: INTERNAL MEDICINE

## 2024-08-15 PROCEDURE — 85610 PROTHROMBIN TIME: CPT

## 2024-08-15 PROCEDURE — 51702 INSERT TEMP BLADDER CATH: CPT

## 2024-08-15 PROCEDURE — 85014 HEMATOCRIT: CPT

## 2024-08-15 RX ORDER — POTASSIUM CHLORIDE 1500 MG/1
20 TABLET, EXTENDED RELEASE ORAL ONCE
Status: COMPLETED | OUTPATIENT
Start: 2024-08-15 | End: 2024-08-15

## 2024-08-15 RX ADMIN — FOLIC ACID 1 MG: 1 TABLET ORAL at 11:16

## 2024-08-15 RX ADMIN — TAMSULOSIN HYDROCHLORIDE 0.4 MG: 0.4 CAPSULE ORAL at 20:53

## 2024-08-15 RX ADMIN — SODIUM CHLORIDE, PRESERVATIVE FREE 10 ML: 5 INJECTION INTRAVENOUS at 11:56

## 2024-08-15 RX ADMIN — MIDODRINE HYDROCHLORIDE 5 MG: 5 TABLET ORAL at 11:16

## 2024-08-15 RX ADMIN — METHOCARBAMOL TABLETS 750 MG: 750 TABLET, COATED ORAL at 11:15

## 2024-08-15 RX ADMIN — METOPROLOL SUCCINATE 25 MG: 25 TABLET, EXTENDED RELEASE ORAL at 11:15

## 2024-08-15 RX ADMIN — ISOSORBIDE DINITRATE 10 MG: 10 TABLET ORAL at 14:59

## 2024-08-15 RX ADMIN — OXYCODONE HYDROCHLORIDE 10 MG: 5 TABLET ORAL at 20:54

## 2024-08-15 RX ADMIN — RIFAXIMIN 550 MG: 550 TABLET ORAL at 20:54

## 2024-08-15 RX ADMIN — ASPIRIN 81 MG CHEWABLE TABLET 81 MG: 81 TABLET CHEWABLE at 11:15

## 2024-08-15 RX ADMIN — RIFAXIMIN 550 MG: 550 TABLET ORAL at 11:16

## 2024-08-15 RX ADMIN — ISOSORBIDE DINITRATE 10 MG: 10 TABLET ORAL at 11:17

## 2024-08-15 RX ADMIN — Medication 100 MG: at 11:16

## 2024-08-15 RX ADMIN — METHOCARBAMOL TABLETS 750 MG: 750 TABLET, COATED ORAL at 20:53

## 2024-08-15 RX ADMIN — LACTULOSE 20 G: 10 SOLUTION ORAL at 11:14

## 2024-08-15 RX ADMIN — MIRTAZAPINE 7.5 MG: 15 TABLET, FILM COATED ORAL at 20:54

## 2024-08-15 RX ADMIN — BUSPIRONE HYDROCHLORIDE 5 MG: 5 TABLET ORAL at 11:16

## 2024-08-15 RX ADMIN — Medication 1000 UNITS: at 11:16

## 2024-08-15 RX ADMIN — ISOSORBIDE DINITRATE 10 MG: 10 TABLET ORAL at 20:53

## 2024-08-15 RX ADMIN — METOPROLOL SUCCINATE 25 MG: 25 TABLET, EXTENDED RELEASE ORAL at 20:53

## 2024-08-15 RX ADMIN — METHOCARBAMOL TABLETS 750 MG: 750 TABLET, COATED ORAL at 15:00

## 2024-08-15 RX ADMIN — POTASSIUM CHLORIDE 20 MEQ: 1500 TABLET, EXTENDED RELEASE ORAL at 11:21

## 2024-08-15 RX ADMIN — BUSPIRONE HYDROCHLORIDE 5 MG: 5 TABLET ORAL at 20:53

## 2024-08-15 RX ADMIN — BUSPIRONE HYDROCHLORIDE 5 MG: 5 TABLET ORAL at 14:59

## 2024-08-15 RX ADMIN — PANTOPRAZOLE SODIUM 40 MG: 40 TABLET, DELAYED RELEASE ORAL at 14:59

## 2024-08-15 RX ADMIN — BUMETANIDE 2 MG: 1 TABLET ORAL at 11:14

## 2024-08-15 RX ADMIN — POLYETHYLENE GLYCOL 3350 17 G: 17 POWDER, FOR SOLUTION ORAL at 20:50

## 2024-08-15 RX ADMIN — POLYETHYLENE GLYCOL 3350 17 G: 17 POWDER, FOR SOLUTION ORAL at 11:17

## 2024-08-15 RX ADMIN — MIDODRINE HYDROCHLORIDE 5 MG: 5 TABLET ORAL at 17:02

## 2024-08-15 RX ADMIN — SODIUM CHLORIDE, PRESERVATIVE FREE 10 ML: 5 INJECTION INTRAVENOUS at 20:55

## 2024-08-15 RX ADMIN — ATORVASTATIN CALCIUM 20 MG: 20 TABLET, FILM COATED ORAL at 20:54

## 2024-08-15 RX ADMIN — PANTOPRAZOLE SODIUM 40 MG: 40 TABLET, DELAYED RELEASE ORAL at 05:51

## 2024-08-15 ASSESSMENT — PAIN SCALES - GENERAL
PAINLEVEL_OUTOF10: 5
PAINLEVEL_OUTOF10: 9

## 2024-08-15 ASSESSMENT — PAIN DESCRIPTION - ORIENTATION
ORIENTATION: MID;LOWER
ORIENTATION: RIGHT;LEFT

## 2024-08-15 ASSESSMENT — PAIN DESCRIPTION - LOCATION: LOCATION: BACK

## 2024-08-15 ASSESSMENT — PAIN DESCRIPTION - DESCRIPTORS
DESCRIPTORS: ACHING
DESCRIPTORS: CRUSHING;DISCOMFORT

## 2024-08-15 ASSESSMENT — PAIN - FUNCTIONAL ASSESSMENT: PAIN_FUNCTIONAL_ASSESSMENT: PREVENTS OR INTERFERES SOME ACTIVE ACTIVITIES AND ADLS

## 2024-08-15 NOTE — PROGRESS NOTES
8/15/2024 11:19 AM  Len Dorsey  [id number]    Subjective:    Eating lunch   Urine is yellow today     Review of Systems  Constitutional: No fever or chills   Respiratory: negative for cough and hemoptysis  Cardiovascular: negative for chest pain and dyspnea  Gastrointestinal: negative for abdominal pain, diarrhea, nausea and vomiting   : See above  Derm: negative for rash and skin lesion(s)  Neurological: negative for seizures and tremors  Musculoskeletal: Negative    Psychiatric: Negative   All other reviews are negative      Scheduled Meds:   potassium chloride  20 mEq Oral Once    bumetanide  2 mg Oral Daily    polyethylene glycol  17 g Oral BID    tamsulosin  0.4 mg Oral Nightly    [Held by provider] metOLazone  5 mg Oral Daily    warfarin placeholder: dosing by pharmacy   Other RX Placeholder    methocarbamol  750 mg Oral 4x Daily    aspirin  81 mg Oral Daily    atorvastatin  20 mg Oral Nightly    metoprolol succinate  25 mg Oral BID    Vitamin D  1,000 Units Oral Daily    pantoprazole  40 mg Oral BID AC    lactulose  20 g Oral Daily    busPIRone  5 mg Oral TID    folic acid  1 mg Oral Daily    [Held by provider] hydrALAZINE  10 mg Oral 3 times per day    isosorbide dinitrate  10 mg Oral TID    midodrine  5 mg Oral TID WC    mirtazapine  7.5 mg Oral Nightly    nicotine  1 patch TransDERmal Daily    thiamine  100 mg Oral Daily    rifAXIMin  550 mg Oral BID    sodium chloride flush  5-40 mL IntraVENous 2 times per day       Objective:  Vitals:    08/15/24 0726   BP: 104/60   Pulse: 84   Resp: 20   Temp: 97 °F (36.1 °C)   SpO2:          Allergies: Patient has no known allergies.    General Appearance: alert and oriented to person, place and time and in no acute distress  Skin: no rash or erythema  Head: normocephalic and atraumatic  Pulmonary/Chest: normal air movement, no respiratory distress  Abdomen: soft, non-tender, non-distended  Genitourinary: marks with yellow urine   Extremities: no cyanosis,

## 2024-08-15 NOTE — PROGRESS NOTES
Occupational Therapy  OCCUPATIONAL THERAPY INITIAL EVALUATION  [facility]  [address], [place]    Date: 8/15/2024     Patient Name: Len Dorsey  MRN: [id number]  : 1965  Room: [address] [place] [address]    Evaluating OT: Elvia Carbajal, OTR/L - [id number]    Referring Provider: Peter Olivo MD   Specific Provider Orders/Date: \"OT eval and treat\" - 8/15/2024    Diagnosis: Anemia [D64.9]  Anemia, unspecified type [D64.9]      Pertinent Medical History: alcoholic liver disease, aortic valve replacement, mitral valve replacement, CVA, pacemaker    Precautions: fall risk    Assessment of Current Deficits:    [x] Functional mobility   [x]ADLs  [x] Strength               []Cognition   [x] Functional transfers   [x] IADLs         [x] Safety Awareness   [x]Endurance   [] Fine Coordination              [x] Balance      [] Vision/perception   []Sensation    []Gross Motor Coordination  [] ROM  [] Delirium                   [] Motor Control     OT PLAN OF CARE   OT POC is based on physician orders, patient diagnosis, and results of clinical assessment.  Frequency/Duration 1-3 days/week for 2 weeks PRN   Specific OT Treatment Interventions to Include:   * Instruction/training on adapted ADL techniques and AE recommendations to increase functional independence within precautions       * Training on energy conservation strategies, correct breathing pattern and techniques to improve independence/tolerance for self-care routine  * Functional transfer/mobility training/DME recommendations for increased independence, safety, and fall prevention  * Patient/Family education to increase follow through with safety techniques and functional independence  * Recommendation of environmental modifications for increased safety with functional transfers/mobility and ADLs  * Therapeutic exercise to improve motor endurance, ROM, and functional strength for ADLs/functional

## 2024-08-15 NOTE — PROGRESS NOTES
[facility] Hospitalist Progress Note    Admitting Date and Time: 8/2/2024 12:11 PM  Admit Dx: Anemia [D64.9]  Anemia, unspecified type [D64.9]    Subjective:  Patient is being followed for Anemia [D64.9]  Anemia, unspecified type [D64.9]   Pt seen and examined this morning  No acute events overnight  Denies any acute complaints    ROS: denies fever, chills, cp, sob, n/v, HA unless stated above.      bumetanide  2 mg Oral Daily    polyethylene glycol  17 g Oral BID    tamsulosin  0.4 mg Oral Nightly    [Held by provider] metOLazone  5 mg Oral Daily    warfarin placeholder: dosing by pharmacy   Other RX Placeholder    methocarbamol  750 mg Oral 4x Daily    [Held by provider] aspirin  81 mg Oral Daily    atorvastatin  20 mg Oral Nightly    metoprolol succinate  25 mg Oral BID    Vitamin D  1,000 Units Oral Daily    pantoprazole  40 mg Oral BID AC    lactulose  20 g Oral Daily    busPIRone  5 mg Oral TID    folic acid  1 mg Oral Daily    [Held by provider] hydrALAZINE  10 mg Oral 3 times per day    isosorbide dinitrate  10 mg Oral TID    midodrine  5 mg Oral TID WC    mirtazapine  7.5 mg Oral Nightly    nicotine  1 patch TransDERmal Daily    thiamine  100 mg Oral Daily    rifAXIMin  550 mg Oral BID    sodium chloride flush  5-40 mL IntraVENous 2 times per day     sodium chloride, , PRN  oxyCODONE, 5 mg, Q6H PRN  oxyCODONE, 10 mg, Q6H PRN  sodium chloride, , PRN  sodium chloride flush, 5-40 mL, PRN  sodium chloride, , PRN  polyethylene glycol, 17 g, Daily PRN  prochlorperazine, 10 mg, Q8H PRN   Or  prochlorperazine, 10 mg, Q6H PRN         Objective:    BP (!) 102/53   Pulse 90   Temp 98.1 °F (36.7 °C) (Oral)   Resp 18   Ht 1.778 m (5' 10\")   Wt 105 kg (231 lb 7.7 oz)   SpO2 97%   BMI 33.21 kg/m²     General Appearance: alert and oriented to person, place and time and in no acute distress  Skin: warm and dry  Head: normocephalic and atraumatic  Eyes: pupils equal, round, and reactive to light, extraocular

## 2024-08-15 NOTE — CARE COORDINATION
Pertinent notes/labs reviewed.  Persistently elevated INR.  H&H has improved.  Positive FOBT but no sign of active bleeding.  Hold off endoscopy today.  Consider procedure tomorrow if improved INR.  Obtain nuclear medicine bleeding scan.  Please, see orders for plan of care.    Federico Riddle MD

## 2024-08-15 NOTE — PROGRESS NOTES
PROGRESS NOTE  By Federico Riddle M.D.    [facility]  Dr. Tejinder Subramanian M.D.,  Dr. Roly Pinzon M.D.,   MAYRA LambO.,  Dr. Lance Lassiter M.D.,  Dr. Min Mcdaniels D.O.,          Len Sunita  59 y.o.  male    SUBJECTIVE:  Denies abdominal pain.  Denies nausea vomiting.  Denies hematemesis emesis of coffee-ground material.  Reports bowel movement earlier which she describes to be without blood or black/tarry stool.    OBJECTIVE:    /60   Pulse 84   Temp 97 °F (36.1 °C) (Axillary)   Resp 20   Ht 1.778 m (5' 10\")   Wt 105 kg (231 lb 7.7 oz)   SpO2 97%   BMI 33.21 kg/m²     General: NAD/adult  male.  AAOx3  HEENT: Anicteric sclera/moist oral mucosa  Neck: Supple with trachea midline  Chest: Symmetric excursion/nonlabored respirations  Cor: Regular  Abd.: Soft and obese.  Nontender  Extr.:  BLE without significant edema  Skin: Warm and dry dry/anicteric      DATA:    Monitor data reviewed -regular/paced rhythm noted.       Lab Results   Component Value Date/Time    WBC 6.4 08/15/2024 08:09 AM    RBC 3.10 08/15/2024 08:09 AM    HGB 7.7 08/15/2024 08:09 AM    HCT 26.6 08/15/2024 08:09 AM    MCV 85.8 08/15/2024 08:09 AM    MCH 24.8 08/15/2024 08:09 AM    MCHC 28.9 08/15/2024 08:09 AM    RDW 22.1 08/15/2024 08:09 AM     08/05/2024 07:30 AM    MPV Can not be calculated 08/15/2024 08:09 AM     Lab Results   Component Value Date/Time     08/15/2024 08:09 AM    K 3.4 08/15/2024 08:09 AM    K 3.9 09/15/2022 03:08 PM     08/15/2024 08:09 AM    CO2 23 08/15/2024 08:09 AM    BUN 65 08/15/2024 08:09 AM    CREATININE 2.5 08/15/2024 08:09 AM    CALCIUM 9.4 08/15/2024 08:09 AM    BILITOT 0.5 08/11/2024 03:10 AM    ALKPHOS 116 08/11/2024 03:10 AM    AST 22 08/11/2024 03:10 AM    ALT <5 08/11/2024 03:10 AM     Lab Results   Component Value Date/Time    LIPASE 32 08/02/2024 12:35 PM     No results found for: \"AMYLASE\"      ASSESSMENT/PLAN:  Patient Active Problem List  report was transcribed using voice recognition software.  Every effort was made to ensure accuracy; however, inadvertent computerized transcription errors may be present.

## 2024-08-15 NOTE — PROGRESS NOTES
Physical Therapy  Facility/Department: [address] MED SURG  Physical Therapy Initial Assessment    Name: Len Dorsey  : 1965  MRN: [id number]  Date of Service: 8/15/2024          Patient Diagnosis(es): The primary encounter diagnosis was Anemia, unspecified type. A diagnosis of Leg swelling was also pertinent to this visit.  Past Medical History:  has a past medical history of Alcoholic liver disease (HCC), H/O prosthetic aortic valve replacement, History of mitral valve replacement, Stroke (HCC), and Testicular cancer (HCC).  Past Surgical History:  has a past surgical history that includes fracture surgery; pacemaker placement (2020); Upper gastrointestinal endoscopy (N/A, 2022); Colonoscopy (N/A, 2022); and Upper gastrointestinal endoscopy (N/A, 2024).         Requires PT Follow-Up: No     Evaluating Therapist: Trina Joseph PT     Referring Provider:  Peter Olivo MD     PT order : PT eval and treat     Room #: 729  DIAGNOSIS: The primary encounter diagnosis was Anemia, unspecified type. A diagnosis of Leg swelling was also pertinent to this visit.  PRECAUTIONS: falls     Social:  Pt lives with  s.o  in a  2  floor plan  steps and 1 rails to enter.  Prior to admission pt walked with  no AD. Independent per pt      Initial Evaluation  Date:  8/15/2024   Was pt agreeable to Eval/treatment?  Yes    Does pt have pain?  None reported    Bed Mobility  Rolling:  NT   Supine to sit:  NT   Sit to supine:  NT   Scooting:  independent    Transfers Sit to stand:  independent   Stand to sit:  independent   Stand pivot:  NT    Ambulation     300  feet with  no AD  with  independent        Stair negotiation: ascended and descended NT    LE ROM  WFL   LE strength  4/ 5    AM- PAC RAW score             Pt is alert , follows commands, some confusion noted      Balance:  independent with gait, no LOB .     Endurance: WFL        ASSESSMENT  Pt displays functional ability as noted in the  objective portion of this evaluation.      Comments:   Pt walking out of restroom  upon arrival ; agreeable to PT. Mobility as above.  No LOB with gait                Pt educated on fall risk, safety with mobility        Patient response to education:   Pt verbalized understanding Pt demonstrated skill Pt requires further education in this area   x   x       Comments:  Pt left  in chair after session, with call light in reach.        Pt’s/ family goals   1. Home     Patient and or family understand(s) diagnosis, prognosis, and plan of care. -  yes     PLAN  No skilled PT recommended in this setting     Time in: 1725   Time out:  1737      Evaluation Time includes thorough review of current medical information, gathering information on past medical history/social history and prior level of function, completion of standardized testing/informal observation of tasks, assessment of data and education on plan of care and goals.    CPT codes:  [x] Low Complexity PT evaluation 16152  [] Moderate Complexity PT evaluation 07825  [] High Complexity PT evaluation 30620  [] PT Re-evaluation 24975  [] Gait training 42017  minutes  [] Therapeutic activities 74992  minutes  [] Therapeutic exercises 07132  minutes  [] Neuromuscular reeducation 54745  minutes       Trina Joseph  License number:  [id number]

## 2024-08-15 NOTE — PROGRESS NOTES
Department of Internal Medicine  Nephrology Progress Note    Events reviewed.    SUBJECTIVE:   We are following for FRANTZ and fluid overload. Patient reports no new complaints.     PHYSICAL EXAM:      Vitals:    VITALS:  BP (!) 102/53   Pulse 90   Temp 98.1 °F (36.7 °C) (Oral)   Resp 18   Ht 1.778 m (5' 10\")   Wt 105 kg (231 lb 7.7 oz)   SpO2 97%   BMI 33.21 kg/m²   24HR BLOOD PRESSURE RANGE:  Systolic (24hrs), Av , Min:93 , Max:125   ; Diastolic (24hrs), Av, Min:48, Max:73    24HR INTAKE/OUTPUT:    Intake/Output Summary (Last 24 hours) at 8/15/2024 1642  Last data filed at 8/15/2024 1545  Gross per 24 hour   Intake 200 ml   Output 6125 ml   Net -5925 ml       Constitutional:  awake NAD  HEENT:  EOMI  Respiratory:  CTA  Cardiovascular/Edema:  S1 S2  Gastrointestinal:  +BS  Neurologic:  no deficits  Skin:  warm dry  Other:  BLE ++edema     DATA:    CBC with Differential:    Lab Results   Component Value Date/Time    WBC 6.4 08/15/2024 08:09 AM    RBC 3.10 08/15/2024 08:09 AM    HGB 8.0 08/15/2024 04:00 PM    HCT 27.4 08/15/2024 04:00 PM     2024 07:30 AM    MCV 85.8 08/15/2024 08:09 AM    MCH 24.8 08/15/2024 08:09 AM    MCHC 28.9 08/15/2024 08:09 AM    RDW 22.1 08/15/2024 08:09 AM    NRBC 1 2024 03:48 AM    METASPCT DUPLICATE 2023 04:12 AM    LYMPHOPCT 14 08/15/2024 08:09 AM    PROMYELOPCT DUPLICATE 2023 04:12 AM    MONOPCT 12 08/15/2024 08:09 AM    MYELOPCT DUPLICATE 2023 04:12 AM    EOSPCT 7 08/15/2024 08:09 AM    BASOPCT 1 08/15/2024 08:09 AM    MONOSABS 0.76 08/15/2024 08:09 AM    LYMPHSABS 0.91 08/15/2024 08:09 AM    EOSABS 0.44 08/15/2024 08:09 AM    BASOSABS 0.05 08/15/2024 08:09 AM     CMP:    Lab Results   Component Value Date/Time     08/15/2024 04:00 PM    K 4.0 08/15/2024 04:00 PM    K 3.9 09/15/2022 03:08 PM     08/15/2024 04:00 PM    CO2 26 08/15/2024 04:00 PM    BUN 60 08/15/2024 04:00 PM    CREATININE 2.4 08/15/2024 04:00 PM    GFRAA >60

## 2024-08-15 NOTE — PLAN OF CARE
Problem: ABCDS Injury Assessment  Goal: Absence of physical injury  Outcome: Progressing     Problem: Discharge Planning  Goal: Discharge to home or other facility with appropriate resources  Outcome: Progressing  Flowsheets (Taken 8/14/2024 2000)  Discharge to home or other facility with appropriate resources:   Identify barriers to discharge with patient and caregiver   Arrange for needed discharge resources and transportation as appropriate   Identify discharge learning needs (meds, wound care, etc)   Arrange for interpreters to assist at discharge as needed   Refer to discharge planning if patient needs post-hospital services based on physician order or complex needs related to functional status, cognitive ability or social support system     Problem: Safety - Adult  Goal: Free from fall injury  Outcome: Progressing     Problem: Pain  Goal: Verbalizes/displays adequate comfort level or baseline comfort level  Outcome: Progressing  Flowsheets (Taken 8/14/2024 1852)  Verbalizes/displays adequate comfort level or baseline comfort level:   Encourage patient to monitor pain and request assistance   Assess pain using appropriate pain scale   Administer analgesics based on type and severity of pain and evaluate response   Implement non-pharmacological measures as appropriate and evaluate response   Consider cultural and social influences on pain and pain management   Notify Licensed Independent Practitioner if interventions unsuccessful or patient reports new pain     Problem: Skin/Tissue Integrity  Goal: Absence of new skin breakdown  Description: 1.  Monitor for areas of redness and/or skin breakdown  2.  Assess vascular access sites hourly  3.  Every 4-6 hours minimum:  Change oxygen saturation probe site  4.  Every 4-6 hours:  If on nasal continuous positive airway pressure, respiratory therapy assess nares and determine need for appliance change or resting period.  Outcome: Progressing     Problem: Chronic

## 2024-08-15 NOTE — PROGRESS NOTES
Pharmacy Consultation Note  (Warfarin Dosing and Monitoring)    Initial consult date: 8/2  Consulting Provider: Latanya Dorsey is a 59 y.o. male for whom pharmacy has been asked to manage warfarin therapy.     Weight:   Wt Readings from Last 1 Encounters:   08/14/24 105 kg (231 lb 7.7 oz)       TSH:    Lab Results   Component Value Date/Time    TSH 0.95 05/08/2024 02:24 AM       Hepatic Function Panel:                            Lab Results   Component Value Date/Time    ALKPHOS 116 08/11/2024 03:10 AM    ALT <5 08/11/2024 03:10 AM    AST 22 08/11/2024 03:10 AM    BILITOT 0.5 08/11/2024 03:10 AM    BILIDIR 0.3 08/11/2024 03:10 AM    IBILI 0.2 08/11/2024 03:10 AM         Recent Labs     08/13/24  2120 08/14/24  0519 08/15/24  0809   HGB 7.5* 6.6* 7.7*     Date Warfarin Dose INR Heparin or LMWH Comment   8/3 NO DOSE 5 Lovenox DC'd    8/4 NO DOSE 4 --    8/5 4 mg 3.1 --    8/6 6 mg 2.6 --    8/7 6 mg 2.6 --    8/8 2 mg 3.7 --    8/9 NO DOSE 6.0 --    8/10 NO DOSE 9.5 -- Vit K 5mg x2 PO   8/11 NO DOSE >10    -- Vitamin K 5 mg po x 1 (0520); Vitamin K 10 mg po x 1 (1229)   8/12 NO DOSE 6.1 --    8/13 1 mg - NOT GIVEN 3.7 x    8/14 NO DOSE 3.0 x    8/15 NO DOSE 3.4 x             Assessment:  Patient is a 59 y.o. male on warfarin for Atrial Fibrillation, Mechanical Heart Valve (mitral), and Mechanical Heart Valve (atrial).  Patient's home warfarin dosing regimen is 4mg daily per med rec but patient filled 3mg tabs most recently. Per cardiology, the patient is unable to have his INR checked with appointments.    INR possibly elevated in setting of liver dysfunction.  Goal INR 2.5 - 3.5  Patient did not receive warfarin on 8/13.  Patient ripped out his Culver on 8/13  INR increased from 3.0 yesterday to 3.4 today without any warfarin dose.    Plan:  No warfarin tonight  Daily PT/INR until the INR is stable within the therapeutic range.  Pharmacist will closely follow and monitor/adjust warfarin dosing as

## 2024-08-15 NOTE — PLAN OF CARE
Problem: ABCDS Injury Assessment  Goal: Absence of physical injury  8/15/2024 0140 by Marcus Mcelroy RN  Outcome: Progressing  8/14/2024 2239 by Elana Squires RN  Outcome: Progressing     Problem: Discharge Planning  Goal: Discharge to home or other facility with appropriate resources  8/15/2024 0140 by Marcus Mcelroy RN  Outcome: Progressing  8/14/2024 2239 by Elana Squires RN  Outcome: Progressing  Flowsheets (Taken 8/14/2024 2000)  Discharge to home or other facility with appropriate resources:   Identify barriers to discharge with patient and caregiver   Arrange for needed discharge resources and transportation as appropriate   Identify discharge learning needs (meds, wound care, etc)   Arrange for interpreters to assist at discharge as needed   Refer to discharge planning if patient needs post-hospital services based on physician order or complex needs related to functional status, cognitive ability or social support system     Problem: Safety - Adult  Goal: Free from fall injury  8/15/2024 0140 by Marcus Mcelroy RN  Outcome: Progressing  8/14/2024 2239 by Elana Squires, RN  Outcome: Progressing     Problem: Pain  Goal: Verbalizes/displays adequate comfort level or baseline comfort level  8/15/2024 0140 by Marcus Mcelroy RN  Outcome: Progressing  8/14/2024 2239 by Elana Squires RN  Outcome: Progressing  Flowsheets (Taken 8/14/2024 1852)  Verbalizes/displays adequate comfort level or baseline comfort level:   Encourage patient to monitor pain and request assistance   Assess pain using appropriate pain scale   Administer analgesics based on type and severity of pain and evaluate response   Implement non-pharmacological measures as appropriate and evaluate response   Consider cultural and social influences on pain and pain management   Notify Licensed Independent Practitioner if interventions unsuccessful or patient reports new pain     Problem: Skin/Tissue Integrity  Goal: Absence of new skin  breakdown  Description: 1.  Monitor for areas of redness and/or skin breakdown  2.  Assess vascular access sites hourly  3.  Every 4-6 hours minimum:  Change oxygen saturation probe site  4.  Every 4-6 hours:  If on nasal continuous positive airway pressure, respiratory therapy assess nares and determine need for appliance change or resting period.  8/15/2024 0140 by Marcus Mcelroy, RN  Outcome: Progressing  8/14/2024 2239 by Elana Squires RN  Outcome: Progressing     Problem: Chronic Conditions and Co-morbidities  Goal: Patient's chronic conditions and co-morbidity symptoms are monitored and maintained or improved  8/15/2024 0140 by Marcus Mcelroy RN  Outcome: Progressing  8/14/2024 2239 by Elana Squires RN  Outcome: Progressing  Flowsheets (Taken 8/14/2024 2000)  Care Plan - Patient's Chronic Conditions and Co-Morbidity Symptoms are Monitored and Maintained or Improved:   Monitor and assess patient's chronic conditions and comorbid symptoms for stability, deterioration, or improvement   Collaborate with multidisciplinary team to address chronic and comorbid conditions and prevent exacerbation or deterioration   Update acute care plan with appropriate goals if chronic or comorbid symptoms are exacerbated and prevent overall improvement and discharge     Problem: Hematologic - Adult  Goal: Maintains hematologic stability  8/15/2024 0140 by Marcus Mcelroy RN  Outcome: Progressing  8/14/2024 2239 by Elana Squires RN  Outcome: Progressing  Flowsheets (Taken 8/14/2024 2000)  Maintains hematologic stability: Assess for signs and symptoms of bleeding or hemorrhage

## 2024-08-15 NOTE — PROGRESS NOTES
2.5-3.5; pharmacy managing discussed with pharmacy  Recommend continuing to hold for today given extremely labile INR increasing without warfarin likely due to underlying liver disease  Recommend home INR device for self monitoring, he is not able to get his INR checked  On low-dose aspirin, recommend holding given ongoing anemia  Pacemaker interrogation reviewed, normal device function  Aggressive risk factor modification including weight loss, smoking cessation and abstinence from alcohol strongly recommended  Further care per primary service and consultants    Darrell Harvey MD, [facility]    NOTE: This report was transcribed using voice recognition software. Every effort was made to ensure accuracy; however, inadvertent computerized transcription errors may be present.

## 2024-08-16 ENCOUNTER — APPOINTMENT (OUTPATIENT)
Dept: NUCLEAR MEDICINE | Age: 59
DRG: 811 | End: 2024-08-16
Payer: MEDICARE

## 2024-08-16 LAB
ALBUMIN SERPL-MCNC: 3.9 G/DL (ref 3.5–5.2)
ALP SERPL-CCNC: 108 U/L (ref 40–129)
ALT SERPL-CCNC: <5 U/L (ref 0–40)
ANION GAP SERPL CALCULATED.3IONS-SCNC: 16 MMOL/L (ref 7–16)
AST SERPL-CCNC: 29 U/L (ref 0–39)
BASOPHILS # BLD: 0.06 K/UL (ref 0–0.2)
BASOPHILS NFR BLD: 1 % (ref 0–2)
BILIRUB SERPL-MCNC: 0.9 MG/DL (ref 0–1.2)
BUN SERPL-MCNC: 59 MG/DL (ref 6–20)
CALCIUM SERPL-MCNC: 8.7 MG/DL (ref 8.6–10.2)
CHLORIDE SERPL-SCNC: 102 MMOL/L (ref 98–107)
CO2 SERPL-SCNC: 22 MMOL/L (ref 22–29)
CREAT SERPL-MCNC: 2.6 MG/DL (ref 0.7–1.2)
EOSINOPHIL # BLD: 0.52 K/UL (ref 0.05–0.5)
EOSINOPHILS RELATIVE PERCENT: 6 % (ref 0–6)
ERYTHROCYTE [DISTWIDTH] IN BLOOD BY AUTOMATED COUNT: 22.5 % (ref 11.5–15)
GFR, ESTIMATED: 27 ML/MIN/1.73M2
GLUCOSE SERPL-MCNC: 100 MG/DL (ref 74–99)
HCT VFR BLD AUTO: 27.9 % (ref 37–54)
HCT VFR BLD AUTO: 28.3 % (ref 37–54)
HGB BLD-MCNC: 8 G/DL (ref 12.5–16.5)
HGB BLD-MCNC: 8.1 G/DL (ref 12.5–16.5)
IMM GRANULOCYTES # BLD AUTO: 0.04 K/UL (ref 0–0.58)
IMM GRANULOCYTES NFR BLD: 0 % (ref 0–5)
INR PPP: 3.4
LYMPHOCYTES NFR BLD: 1.48 K/UL (ref 1.5–4)
LYMPHOCYTES RELATIVE PERCENT: 16 % (ref 20–42)
MCH RBC QN AUTO: 24.7 PG (ref 26–35)
MCHC RBC AUTO-ENTMCNC: 28.6 G/DL (ref 32–34.5)
MCV RBC AUTO: 86.3 FL (ref 80–99.9)
MONOCYTES NFR BLD: 1.1 K/UL (ref 0.1–0.95)
MONOCYTES NFR BLD: 12 % (ref 2–12)
NEUTROPHILS NFR BLD: 66 % (ref 43–80)
NEUTS SEG NFR BLD: 6.27 K/UL (ref 1.8–7.3)
PLATELET, FLUORESCENCE: 121 K/UL (ref 130–450)
PMV BLD AUTO: ABNORMAL FL (ref 7–12)
POTASSIUM SERPL-SCNC: 3.7 MMOL/L (ref 3.5–5)
PROT SERPL-MCNC: 7.5 G/DL (ref 6.4–8.3)
PROTHROMBIN TIME: 36.4 SEC (ref 9.3–12.4)
RBC # BLD AUTO: 3.28 M/UL (ref 3.8–5.8)
RBC # BLD: ABNORMAL 10*6/UL
SODIUM SERPL-SCNC: 140 MMOL/L (ref 132–146)
WBC OTHER # BLD: 9.5 K/UL (ref 4.5–11.5)

## 2024-08-16 PROCEDURE — 6370000000 HC RX 637 (ALT 250 FOR IP): Performed by: INTERNAL MEDICINE

## 2024-08-16 PROCEDURE — 6370000000 HC RX 637 (ALT 250 FOR IP)

## 2024-08-16 PROCEDURE — 2060000000 HC ICU INTERMEDIATE R&B

## 2024-08-16 PROCEDURE — 2580000003 HC RX 258: Performed by: INTERNAL MEDICINE

## 2024-08-16 PROCEDURE — 3430000000 HC RX DIAGNOSTIC RADIOPHARMACEUTICAL: Performed by: RADIOLOGY

## 2024-08-16 PROCEDURE — 80053 COMPREHEN METABOLIC PANEL: CPT

## 2024-08-16 PROCEDURE — 99232 SBSQ HOSP IP/OBS MODERATE 35: CPT | Performed by: INTERNAL MEDICINE

## 2024-08-16 PROCEDURE — A9560 TC99M LABELED RBC: HCPCS | Performed by: RADIOLOGY

## 2024-08-16 PROCEDURE — 85610 PROTHROMBIN TIME: CPT

## 2024-08-16 PROCEDURE — 6370000000 HC RX 637 (ALT 250 FOR IP): Performed by: HOSPITALIST

## 2024-08-16 PROCEDURE — 85014 HEMATOCRIT: CPT

## 2024-08-16 PROCEDURE — 85025 COMPLETE CBC W/AUTO DIFF WBC: CPT

## 2024-08-16 PROCEDURE — 85018 HEMOGLOBIN: CPT

## 2024-08-16 PROCEDURE — 78278 ACUTE GI BLOOD LOSS IMAGING: CPT

## 2024-08-16 PROCEDURE — 6370000000 HC RX 637 (ALT 250 FOR IP): Performed by: NURSE PRACTITIONER

## 2024-08-16 RX ADMIN — PANTOPRAZOLE SODIUM 40 MG: 40 TABLET, DELAYED RELEASE ORAL at 16:31

## 2024-08-16 RX ADMIN — METOPROLOL SUCCINATE 25 MG: 25 TABLET, EXTENDED RELEASE ORAL at 21:15

## 2024-08-16 RX ADMIN — SODIUM CHLORIDE, PRESERVATIVE FREE 10 ML: 5 INJECTION INTRAVENOUS at 10:41

## 2024-08-16 RX ADMIN — ISOSORBIDE DINITRATE 10 MG: 10 TABLET ORAL at 10:39

## 2024-08-16 RX ADMIN — BUMETANIDE 2 MG: 1 TABLET ORAL at 10:39

## 2024-08-16 RX ADMIN — PANTOPRAZOLE SODIUM 40 MG: 40 TABLET, DELAYED RELEASE ORAL at 05:31

## 2024-08-16 RX ADMIN — OXYCODONE HYDROCHLORIDE 10 MG: 5 TABLET ORAL at 14:43

## 2024-08-16 RX ADMIN — MIDODRINE HYDROCHLORIDE 5 MG: 5 TABLET ORAL at 10:39

## 2024-08-16 RX ADMIN — FOLIC ACID 1 MG: 1 TABLET ORAL at 10:39

## 2024-08-16 RX ADMIN — METHOCARBAMOL TABLETS 750 MG: 750 TABLET, COATED ORAL at 21:15

## 2024-08-16 RX ADMIN — MIRTAZAPINE 7.5 MG: 15 TABLET, FILM COATED ORAL at 21:15

## 2024-08-16 RX ADMIN — Medication 100 MG: at 10:39

## 2024-08-16 RX ADMIN — METHOCARBAMOL TABLETS 750 MG: 750 TABLET, COATED ORAL at 16:33

## 2024-08-16 RX ADMIN — RIFAXIMIN 550 MG: 550 TABLET ORAL at 21:16

## 2024-08-16 RX ADMIN — METHOCARBAMOL TABLETS 750 MG: 750 TABLET, COATED ORAL at 14:42

## 2024-08-16 RX ADMIN — SODIUM CHLORIDE, PRESERVATIVE FREE 10 ML: 5 INJECTION INTRAVENOUS at 21:16

## 2024-08-16 RX ADMIN — BUSPIRONE HYDROCHLORIDE 5 MG: 5 TABLET ORAL at 14:42

## 2024-08-16 RX ADMIN — ATORVASTATIN CALCIUM 20 MG: 20 TABLET, FILM COATED ORAL at 21:15

## 2024-08-16 RX ADMIN — BUSPIRONE HYDROCHLORIDE 5 MG: 5 TABLET ORAL at 10:39

## 2024-08-16 RX ADMIN — RIFAXIMIN 550 MG: 550 TABLET ORAL at 10:39

## 2024-08-16 RX ADMIN — POLYETHYLENE GLYCOL 3350 17 G: 17 POWDER, FOR SOLUTION ORAL at 21:16

## 2024-08-16 RX ADMIN — ISOSORBIDE DINITRATE 10 MG: 10 TABLET ORAL at 21:15

## 2024-08-16 RX ADMIN — LACTULOSE 20 G: 10 SOLUTION ORAL at 10:39

## 2024-08-16 RX ADMIN — TAMSULOSIN HYDROCHLORIDE 0.4 MG: 0.4 CAPSULE ORAL at 21:16

## 2024-08-16 RX ADMIN — Medication 20 MILLICURIE: at 08:50

## 2024-08-16 RX ADMIN — Medication 1000 UNITS: at 10:39

## 2024-08-16 RX ADMIN — ISOSORBIDE DINITRATE 10 MG: 10 TABLET ORAL at 14:42

## 2024-08-16 RX ADMIN — OXYCODONE HYDROCHLORIDE 10 MG: 5 TABLET ORAL at 05:31

## 2024-08-16 RX ADMIN — BUSPIRONE HYDROCHLORIDE 5 MG: 5 TABLET ORAL at 21:15

## 2024-08-16 RX ADMIN — MIDODRINE HYDROCHLORIDE 5 MG: 5 TABLET ORAL at 16:31

## 2024-08-16 RX ADMIN — METOPROLOL SUCCINATE 25 MG: 25 TABLET, EXTENDED RELEASE ORAL at 10:39

## 2024-08-16 RX ADMIN — MIDODRINE HYDROCHLORIDE 5 MG: 5 TABLET ORAL at 14:42

## 2024-08-16 RX ADMIN — METHOCARBAMOL TABLETS 750 MG: 750 TABLET, COATED ORAL at 10:39

## 2024-08-16 ASSESSMENT — PAIN DESCRIPTION - DESCRIPTORS: DESCRIPTORS: DISCOMFORT;CRUSHING

## 2024-08-16 ASSESSMENT — PAIN DESCRIPTION - ORIENTATION: ORIENTATION: LEFT;RIGHT

## 2024-08-16 ASSESSMENT — PAIN DESCRIPTION - LOCATION: LOCATION: BACK

## 2024-08-16 ASSESSMENT — PAIN SCALES - GENERAL
PAINLEVEL_OUTOF10: 8
PAINLEVEL_OUTOF10: 7

## 2024-08-16 NOTE — PROGRESS NOTES
Pharmacy Consultation Note  (Warfarin Dosing and Monitoring)    Initial consult date: 8/2  Consulting Provider: Latanya Dorsey is a 59 y.o. male for whom pharmacy has been asked to manage warfarin therapy.     Weight:   Wt Readings from Last 1 Encounters:   08/14/24 105 kg (231 lb 7.7 oz)       TSH:    Lab Results   Component Value Date/Time    TSH 0.95 05/08/2024 02:24 AM       Hepatic Function Panel:                            Lab Results   Component Value Date/Time    ALKPHOS 108 08/16/2024 03:50 AM    ALT <5 08/16/2024 03:50 AM    AST 29 08/16/2024 03:50 AM    BILITOT 0.9 08/16/2024 03:50 AM    BILIDIR 0.3 08/11/2024 03:10 AM    IBILI 0.2 08/11/2024 03:10 AM         Recent Labs     08/15/24  0809 08/15/24  1600 08/16/24  0350   HGB 7.7* 8.0* 8.1*     Date Warfarin Dose INR Heparin or LMWH Comment   8/3 NO DOSE 5 Lovenox DC'd    8/4 NO DOSE 4 --    8/5 4 mg 3.1 --    8/6 6 mg 2.6 --    8/7 6 mg 2.6 --    8/8 2 mg 3.7 --    8/9 NO DOSE 6.0 --    8/10 NO DOSE 9.5 -- Vit K 5mg x2 PO   8/11 NO DOSE >10    -- Vitamin K 5 mg po x 1 (0520); Vitamin K 10 mg po x 1 (1229)   8/12 NO DOSE 6.1 --    8/13 1 mg - NOT GIVEN 3.7 x    8/14 NO DOSE 3.0 x    8/15 NO DOSE 3.4 x    8/16 NO DOSE 3.4 x                           Assessment:  Patient is a 59 y.o. male on warfarin for Atrial Fibrillation, Mechanical Heart Valve (mitral), and Mechanical Heart Valve (atrial).  Patient's home warfarin dosing regimen is 4mg daily per med rec but patient filled 3mg tabs most recently. Per cardiology, the patient is unable to have his INR checked with appointments.    INR possibly elevated in setting of liver dysfunction.  Goal INR 2.5 - 3.5  INR 3.4 again today    Plan:  Will hold warfarin again tonight - discussed with Dr. Harvey  Daily PT/INR until the INR is stable within the therapeutic range.  Pharmacist will closely follow and monitor/adjust warfarin dosing as necessary.    Artie Ram, PharmD, BCCCP  8/16/2024  12:06  PM

## 2024-08-16 NOTE — PROGRESS NOTES
PROGRESS NOTE  By Federico Riddle M.D.    [facility]  Dr. Tejinder Subramanian M.D.,  Dr. Roly Pinzon M.D.,   Dr. Osmany Rdz D.O.,  Dr. Lance Lassiter M.D.,  MAYRA HooperO.,          Len Sunita  59 y.o.  male    SUBJECTIVE:  Denies significant abdominal pain.  Denies nausea vomiting.  Denies hematemesis versus coffee-ground material.  Denies blood in the stool black/tarry stool    OBJECTIVE:    BP (!) 110/58   Pulse 86   Temp 97.6 °F (36.4 °C) (Oral)   Resp 18   Ht 1.778 m (5' 10\")   Wt 105 kg (231 lb 7.7 oz)   SpO2 100%   BMI 33.21 kg/m²     General:.  NAD/adult  male.  AAOx3  HEENT: Anicteric sclera/moist oral mucosa  Neck: Supple/trachea is midline  Chest: Symmetric excursion/nonlabored respirations  Cor: Regular/S1-S2  Abd.: Soft.  Nontender  Extr.:  No significant peripheral edema  Skin: Warm and dry/anicteric      DATA:    Monitor data reviewed - noted.       Lab Results   Component Value Date/Time    WBC 9.5 08/16/2024 03:50 AM    RBC 3.28 08/16/2024 03:50 AM    HGB 8.1 08/16/2024 03:50 AM    HCT 28.3 08/16/2024 03:50 AM    MCV 86.3 08/16/2024 03:50 AM    MCH 24.7 08/16/2024 03:50 AM    MCHC 28.6 08/16/2024 03:50 AM    RDW 22.5 08/16/2024 03:50 AM     08/05/2024 07:30 AM    MPV Can not be calculated 08/16/2024 03:50 AM     Lab Results   Component Value Date/Time     08/16/2024 03:50 AM    K 3.7 08/16/2024 03:50 AM    K 3.9 09/15/2022 03:08 PM     08/16/2024 03:50 AM    CO2 22 08/16/2024 03:50 AM    BUN 59 08/16/2024 03:50 AM    CREATININE 2.6 08/16/2024 03:50 AM    CALCIUM 8.7 08/16/2024 03:50 AM    BILITOT 0.9 08/16/2024 03:50 AM    ALKPHOS 108 08/16/2024 03:50 AM    AST 29 08/16/2024 03:50 AM    ALT <5 08/16/2024 03:50 AM     Lab Results   Component Value Date/Time    LIPASE 32 08/02/2024 12:35 PM     No results found for: \"AMYLASE\"      ASSESSMENT/PLAN:  Patient Active Problem List   Diagnosis    Chest pain    Closed fracture of nasal bones     studies   -Monitor labs     2.  Anemia  -No evidence of overt bleed  -Acute on chronic anemia   -Iron deficient//normocytic  -EGD and colonoscopy in 2022 with no remark of varices  -EGD 5/9/2024 revealing no varices -found was portal hypertensive gastropathy and gastritis/duodenitis.  -Twice a day PPI  -Consider upper endoscopy if further decrease in H&H  -Pending FOBT  -IV iron supplementation  -Plan for outpatient colonoscopy -most recent colonoscopy 2022 with recommendation for repeat colonoscopy 3 years  -Regarding endoscopy see below     3.  Oral anticoagulation/iatrogenic coagulopathy  -Supratherapeutic INR on presentation in May  -Persistent elevation with most recent dose of Coumadin 8/8/2024  -Overall improvement of INR after multiple doses of vitamin K.  -Likely contribution from liver dysfunction  -Anticoagulation management per admitting/cardiology     4.  Alcohol abuse/alcohol withdrawal  -Ongoing alcohol abuse -patient admits drinking until less than 2 3 weeks ago  -[facility] protocol  -Withdrawal management Per admitting     5.  CRC screening  -Colonoscopy with 4 polyps (tubular adenoma/fecal material) 2022  -Repeat colonoscopy per general surgery recommendations in 2025     6.  Renal failure  -Acute on chronic  -Per admitting/pertinent consultants     H&H with appropriate improvement after transfusion.  No evidence of overt bleed however positive FOBT.  INR has remained elevated.  Negative nuclear medicine bleeding scan today 8/16/2024  No immediate plan EGD given lack of clear evidence of overt bleeding or precipitous drop in H&H and persistent coagulopathy.  If H&H is to remain stable consider discharge from GI POV in the next 24 to 48 hours depending on clinical course and input from other consultant services.  Above has been this discussed with the patient and all questions answered to his satisfaction.  He verbalized understanding and agreement with the plan as delineated.  Please, see orders for

## 2024-08-16 NOTE — PROGRESS NOTES
INPATIENT CARDIOLOGY FOLLOW-UP    Name: Len Dorsey    Age: 59 y.o.    Date of Admission: 8/2/2024 12:11 PM    Date of Service: 8/16/2024    Primary Cardiologist: Akash    Chief Complaint: Follow-up for CHF    Interim History:  Seen down in nuclear medicine, he was getting a bleeding scan.  Complaining some chest discomfort.  Denies shortness of breath.  Still having brisk urine output.    Net -31 L    Review of Systems:   Negative except as described above    Problem List:  Patient Active Problem List   Diagnosis    Chest pain    Closed fracture of nasal bones    Injury of face    Retroperitoneal hematoma    Vitamin D deficiency    Acute chest pain    Acute cholecystitis    Gastrointestinal hemorrhage    Polyp of colon    Right upper quadrant abdominal pain    Diarrhea    Current moderate episode of major depressive disorder without prior episode (HCC)    Hypertension    H/O mitral valve replacement with mechanical valve    H/O mechanical aortic valve replacement    Coronary artery disease involving native coronary artery of native heart without angina pectoris    Paravalvular leak (prosthetic valve)    Hx of CABG    Warfarin anticoagulation    Supratherapeutic INR    Moderate obesity    Dyspnea    Alcohol withdrawal syndrome without complication (HCC)    DTs (delirium tremens) (HCC)    Anemia    Severe protein-calorie malnutrition (HCC)    Acute decompensated heart failure (HCC)    Aortic valve disease    Mitral valve disease    AV block    Pure hypercholesterolemia    Chronic obstructive pulmonary disease (HCC)    Loculated pleural effusion    Troponin level elevated    Stage 2 chronic kidney disease    Alcohol use    Breast swelling    Pleural effusion    Hypophosphatemia    Acute congestive heart failure (HCC)    FRANTZ (acute kidney injury) (HCC)    Acidosis    Hyperglycemia    Hyperlipidemia    Alcoholic liver disease, unspecified (HCC)    Thrombocytopenia (HCC)    Delirium    Acute blood loss anemia

## 2024-08-16 NOTE — CARE COORDINATION
Attempted to meet with patient, he is carmina for nuclear med bleeding scan. Referral made to [facility] with [employer] for home INR monitoring device. [facility] will get order signed by Jama DIAZ, once order is submitted to insurance and patient agreeable to co-pay patient should receive device in approx. 10 days. PT/23 OT/19, INR 3.4 PTT 36.4. Currently on PO bumex,  on hold: hydralazine, zaroxolyn. Continue marks catheter for urinary retention per Urology. Anticipated to discharge home. John Grimmyce is HCPOA. Will continue to follow.   Deja REID, RN  Case Management       Addendum: Pt. returned to room, CM updated patient with status of INR machine order, patient verbalized understanding and is appreciative.  Deja REID, RN  Case Management     Addendum: CM received call from darren Deutsch that pt. Has't been seen by PA since 2023 he has appt 8/22/24 PA will sign order for INR monitor at the time patient has been seen. Pt voices understanding and anticipates to make scheduled appt.  Deja REID, RN  Case Management

## 2024-08-16 NOTE — PROGRESS NOTES
[facility] Hospitalist Progress Note    Admitting Date and Time: 8/2/2024 12:11 PM  Admit Dx: Anemia [D64.9]  Anemia, unspecified type [D64.9]    Subjective:  Patient is being followed for Anemia [D64.9]  Anemia, unspecified type [D64.9]   Pt seen and examined this morning  No acute events overnight  Denies any acute complaints    ROS: denies fever, chills, cp, sob, n/v, HA unless stated above.      bumetanide  2 mg Oral Daily    polyethylene glycol  17 g Oral BID    tamsulosin  0.4 mg Oral Nightly    [Held by provider] metOLazone  5 mg Oral Daily    warfarin placeholder: dosing by pharmacy   Other RX Placeholder    methocarbamol  750 mg Oral 4x Daily    [Held by provider] aspirin  81 mg Oral Daily    atorvastatin  20 mg Oral Nightly    metoprolol succinate  25 mg Oral BID    Vitamin D  1,000 Units Oral Daily    pantoprazole  40 mg Oral BID AC    lactulose  20 g Oral Daily    busPIRone  5 mg Oral TID    folic acid  1 mg Oral Daily    [Held by provider] hydrALAZINE  10 mg Oral 3 times per day    isosorbide dinitrate  10 mg Oral TID    midodrine  5 mg Oral TID WC    mirtazapine  7.5 mg Oral Nightly    nicotine  1 patch TransDERmal Daily    thiamine  100 mg Oral Daily    rifAXIMin  550 mg Oral BID    sodium chloride flush  5-40 mL IntraVENous 2 times per day     sodium chloride, , PRN  oxyCODONE, 5 mg, Q6H PRN  oxyCODONE, 10 mg, Q6H PRN  sodium chloride, , PRN  sodium chloride flush, 5-40 mL, PRN  sodium chloride, , PRN  polyethylene glycol, 17 g, Daily PRN  prochlorperazine, 10 mg, Q8H PRN   Or  prochlorperazine, 10 mg, Q6H PRN         Objective:    BP (!) 110/58   Pulse 86   Temp 97.6 °F (36.4 °C) (Oral)   Resp 18   Ht 1.778 m (5' 10\")   Wt 105 kg (231 lb 7.7 oz)   SpO2 100%   BMI 33.21 kg/m²     General Appearance: alert and oriented to person, place and time and in no acute distress  Skin: warm and dry  Head: normocephalic and atraumatic  Eyes: pupils equal, round, and reactive to light, extraocular  H&H and transfuse as needed.  No plans for endoscopy at this time  FRANTZ.  Improving.  On p.o. Bumex.  Nephrology following  Hepatic encephalopathy.  Continue lactulose and rifaximin.   S/p AVR/MVR.  On warfarin.  Currently on hold.  INR at goal, 3.4 today  Supratherapeutic INR. >10 on admission.  S/p vitamin K.  Continue holding warfarin.  INR at goal (2.5-3.5).  Pharmacy following  Urinary retention.  Culver in place.  CT likely as an outpatient  Hematuria. Resolved   RLE cellulitis.  Completed antibiotics.  ID signed off  Thrombocytopenia.  Monitor  Alcoholic cirrhosis.  Thiamine      NOTE: This report was transcribed using voice recognition software. Every effort was made to ensure accuracy; however, inadvertent computerized transcription errors may be present.  Electronically signed by Peter Olivo MD on 8/16/2024 at 1:09 PM

## 2024-08-16 NOTE — PROGRESS NOTES
Department of Internal Medicine  Nephrology Progress Note    Events reviewed.    SUBJECTIVE:   We are following for FRANTZ and fluid overload. Patient is off the unit at the time of rounding.    PHYSICAL EXAM:      Vitals:    VITALS:  /67   Pulse 86   Temp 97.4 °F (36.3 °C) (Oral)   Resp 18   Ht 1.778 m (5' 10\")   Wt 105 kg (231 lb 7.7 oz)   SpO2 100%   BMI 33.21 kg/m²   24HR BLOOD PRESSURE RANGE:  Systolic (24hrs), Av , Min:101 , Max:118   ; Diastolic (24hrs), Av, Min:53, Max:72    24HR INTAKE/OUTPUT:    Intake/Output Summary (Last 24 hours) at 2024 0950  Last data filed at 2024 0344  Gross per 24 hour   Intake --   Output 2650 ml   Net -2650 ml       Constitutional:  awake NAD  HEENT:  EOMI  Respiratory:  CTA  Cardiovascular/Edema:  S1 S2  Gastrointestinal:  +BS  Neurologic:  no deficits  Skin:  warm dry  Other:  BLE ++edema     DATA:    CBC with Differential:    Lab Results   Component Value Date/Time    WBC 9.5 2024 03:50 AM    RBC 3.28 2024 03:50 AM    HGB 8.1 2024 03:50 AM    HCT 28.3 2024 03:50 AM     2024 07:30 AM    MCV 86.3 2024 03:50 AM    MCH 24.7 2024 03:50 AM    MCHC 28.6 2024 03:50 AM    RDW 22.5 2024 03:50 AM    NRBC 1 2024 03:48 AM    METASPCT DUPLICATE 2023 04:12 AM    LYMPHOPCT 16 2024 03:50 AM    PROMYELOPCT DUPLICATE 2023 04:12 AM    MONOPCT 12 2024 03:50 AM    MYELOPCT DUPLICATE 2023 04:12 AM    EOSPCT 6 2024 03:50 AM    BASOPCT 1 2024 03:50 AM    MONOSABS 1.10 2024 03:50 AM    LYMPHSABS 1.48 2024 03:50 AM    EOSABS 0.52 2024 03:50 AM    BASOSABS 0.06 2024 03:50 AM     CMP:    Lab Results   Component Value Date/Time     2024 03:50 AM    K 3.7 2024 03:50 AM    K 3.9 09/15/2022 03:08 PM     2024 03:50 AM    CO2 22 2024 03:50 AM    BUN 59 2024 03:50 AM    CREATININE 2.6 2024 03:50 AM     metoprolol, hydralazine, Bumex   Hypotension, on midodrine  Normocytic anemia, with iron deficiency ferritin 18, iron saturation 5%, B12 1778, folate 9.2, on IV iron, status post transfusion  --------------------------------------    Cirrhosis, 2/2 Alcohol abuse, on lactulose, rifaximin  Hepatic encephalopathy, lactulose, rifaximin, GI following   CAD status post CABG  AVR and MVR mechanical valves, on warfarin  HLD, on atorvastatin   History of testicular cancer  History of CVA  Right leg cellulitis, on cefazolin  BPH, on tamsulosin     Plan:     Continue Bumex 2 mg PO daily  Continue Culver catheter for urinary retention per urology  Continue, isosorbide 10 mg three times daily, metoprolol succinate 25 mg twice daily  Continue midodrine 5 mg 3 times daily  Continue to monitor renal function, BMP at 4 PM  Continue to monitor blood pressure  Okay to discharge from renal point of view      Electronically signed by ARNALDO Harrison CNP on 8/16/2024 at 9:50 AM     I saw and evaluated the patient, performing the key elements of the service. I discussed the findings, assessment and plan with NP and agree with her findings and plans as documented in her note.        Omar Joiner MD

## 2024-08-16 NOTE — CARE COORDINATION
Pertinent notes/labs reviewed.  Stable H&H/persistently elevated INR.  No immediate plan for endoscopy today.  Await nuclear medicine bleeding scan.  Monitor labs.  Thank you    Federico Riddle MD

## 2024-08-17 VITALS
DIASTOLIC BLOOD PRESSURE: 55 MMHG | BODY MASS INDEX: 33.14 KG/M2 | HEART RATE: 85 BPM | HEIGHT: 70 IN | WEIGHT: 231.48 LBS | OXYGEN SATURATION: 98 % | SYSTOLIC BLOOD PRESSURE: 98 MMHG | RESPIRATION RATE: 16 BRPM | TEMPERATURE: 98.4 F

## 2024-08-17 LAB
ANION GAP SERPL CALCULATED.3IONS-SCNC: 15 MMOL/L (ref 7–16)
BASOPHILS # BLD: 0 K/UL (ref 0–0.2)
BASOPHILS NFR BLD: 0 % (ref 0–2)
BUN SERPL-MCNC: 64 MG/DL (ref 6–20)
CALCIUM SERPL-MCNC: 8.5 MG/DL (ref 8.6–10.2)
CHLORIDE SERPL-SCNC: 102 MMOL/L (ref 98–107)
CO2 SERPL-SCNC: 22 MMOL/L (ref 22–29)
CREAT SERPL-MCNC: 2.8 MG/DL (ref 0.7–1.2)
EOSINOPHIL # BLD: 0.36 K/UL (ref 0.05–0.5)
EOSINOPHILS RELATIVE PERCENT: 5 % (ref 0–6)
ERYTHROCYTE [DISTWIDTH] IN BLOOD BY AUTOMATED COUNT: 23.1 % (ref 11.5–15)
GFR, ESTIMATED: 25 ML/MIN/1.73M2
GLUCOSE SERPL-MCNC: 94 MG/DL (ref 74–99)
HCT VFR BLD AUTO: 27.1 % (ref 37–54)
HGB BLD-MCNC: 7.8 G/DL (ref 12.5–16.5)
INR PPP: 2.9
LYMPHOCYTES NFR BLD: 1.36 K/UL (ref 1.5–4)
LYMPHOCYTES RELATIVE PERCENT: 17 % (ref 20–42)
MCH RBC QN AUTO: 24.8 PG (ref 26–35)
MCHC RBC AUTO-ENTMCNC: 28.8 G/DL (ref 32–34.5)
MCV RBC AUTO: 86.3 FL (ref 80–99.9)
MONOCYTES NFR BLD: 0.64 K/UL (ref 0.1–0.95)
MONOCYTES NFR BLD: 8 % (ref 2–12)
NEUTROPHILS NFR BLD: 71 % (ref 43–80)
NEUTS SEG NFR BLD: 5.64 K/UL (ref 1.8–7.3)
PLATELET, FLUORESCENCE: 138 K/UL (ref 130–450)
PMV BLD AUTO: ABNORMAL FL (ref 7–12)
POTASSIUM SERPL-SCNC: 3.4 MMOL/L (ref 3.5–5)
PROTHROMBIN TIME: 30.7 SEC (ref 9.3–12.4)
RBC # BLD AUTO: 3.14 M/UL (ref 3.8–5.8)
RBC # BLD: ABNORMAL 10*6/UL
SODIUM SERPL-SCNC: 139 MMOL/L (ref 132–146)
WBC OTHER # BLD: 8 K/UL (ref 4.5–11.5)

## 2024-08-17 PROCEDURE — 99232 SBSQ HOSP IP/OBS MODERATE 35: CPT | Performed by: INTERNAL MEDICINE

## 2024-08-17 PROCEDURE — 85610 PROTHROMBIN TIME: CPT

## 2024-08-17 PROCEDURE — 6370000000 HC RX 637 (ALT 250 FOR IP): Performed by: INTERNAL MEDICINE

## 2024-08-17 PROCEDURE — 99239 HOSP IP/OBS DSCHRG MGMT >30: CPT | Performed by: INTERNAL MEDICINE

## 2024-08-17 PROCEDURE — 85025 COMPLETE CBC W/AUTO DIFF WBC: CPT

## 2024-08-17 PROCEDURE — 6370000000 HC RX 637 (ALT 250 FOR IP)

## 2024-08-17 PROCEDURE — 80048 BASIC METABOLIC PNL TOTAL CA: CPT

## 2024-08-17 PROCEDURE — 2580000003 HC RX 258: Performed by: INTERNAL MEDICINE

## 2024-08-17 RX ORDER — TAMSULOSIN HYDROCHLORIDE 0.4 MG/1
0.4 CAPSULE ORAL NIGHTLY
Qty: 30 CAPSULE | Refills: 1 | Status: SHIPPED | OUTPATIENT
Start: 2024-08-17

## 2024-08-17 RX ORDER — LACTULOSE 10 G/15ML
20 SOLUTION ORAL ONCE
Status: COMPLETED | OUTPATIENT
Start: 2024-08-17 | End: 2024-08-17

## 2024-08-17 RX ORDER — WARFARIN SODIUM 1 MG/1
1 TABLET ORAL
Status: DISCONTINUED | OUTPATIENT
Start: 2024-08-17 | End: 2024-08-17 | Stop reason: HOSPADM

## 2024-08-17 RX ORDER — WARFARIN SODIUM 1 MG/1
TABLET ORAL
Qty: 30 TABLET | Refills: 3 | Status: SHIPPED | OUTPATIENT
Start: 2024-08-17

## 2024-08-17 RX ORDER — METHOCARBAMOL 750 MG/1
750 TABLET, FILM COATED ORAL 4 TIMES DAILY
Qty: 40 TABLET | Refills: 0 | Status: SHIPPED | OUTPATIENT
Start: 2024-08-17 | End: 2024-08-27

## 2024-08-17 RX ORDER — BUMETANIDE 2 MG/1
2 TABLET ORAL DAILY
Qty: 30 TABLET | Refills: 1 | Status: SHIPPED | OUTPATIENT
Start: 2024-08-18

## 2024-08-17 RX ADMIN — Medication 1000 UNITS: at 08:44

## 2024-08-17 RX ADMIN — BUSPIRONE HYDROCHLORIDE 5 MG: 5 TABLET ORAL at 08:45

## 2024-08-17 RX ADMIN — RIFAXIMIN 550 MG: 550 TABLET ORAL at 08:44

## 2024-08-17 RX ADMIN — METHOCARBAMOL TABLETS 750 MG: 750 TABLET, COATED ORAL at 08:44

## 2024-08-17 RX ADMIN — ISOSORBIDE DINITRATE 10 MG: 10 TABLET ORAL at 08:44

## 2024-08-17 RX ADMIN — LACTULOSE 20 G: 20 SOLUTION ORAL at 04:09

## 2024-08-17 RX ADMIN — FOLIC ACID 1 MG: 1 TABLET ORAL at 08:44

## 2024-08-17 RX ADMIN — MIDODRINE HYDROCHLORIDE 5 MG: 5 TABLET ORAL at 12:07

## 2024-08-17 RX ADMIN — Medication 100 MG: at 08:45

## 2024-08-17 RX ADMIN — MIDODRINE HYDROCHLORIDE 5 MG: 5 TABLET ORAL at 08:44

## 2024-08-17 RX ADMIN — METHOCARBAMOL TABLETS 750 MG: 750 TABLET, COATED ORAL at 12:07

## 2024-08-17 RX ADMIN — METOPROLOL SUCCINATE 25 MG: 25 TABLET, EXTENDED RELEASE ORAL at 08:44

## 2024-08-17 RX ADMIN — SODIUM CHLORIDE, PRESERVATIVE FREE 10 ML: 5 INJECTION INTRAVENOUS at 08:45

## 2024-08-17 RX ADMIN — LACTULOSE 20 G: 10 SOLUTION ORAL at 08:45

## 2024-08-17 RX ADMIN — BUMETANIDE 2 MG: 1 TABLET ORAL at 08:44

## 2024-08-17 RX ADMIN — PANTOPRAZOLE SODIUM 40 MG: 40 TABLET, DELAYED RELEASE ORAL at 06:31

## 2024-08-17 NOTE — PROGRESS NOTES
INPATIENT CARDIOLOGY FOLLOW-UP    Name: Len Dorsey    Age: 59 y.o.    Date of Admission: 8/2/2024 12:11 PM    Date of Service: 8/17/2024    Primary Cardiologist: Akash    Chief Complaint: Follow-up for CHF    Interim History:  Feeling better, would like to be discharged.  Bleeding scan was negative.    Net -32 L    Review of Systems:   Negative except as described above    Problem List:  Patient Active Problem List   Diagnosis    Chest pain    Closed fracture of nasal bones    Injury of face    Retroperitoneal hematoma    Vitamin D deficiency    Acute chest pain    Acute cholecystitis    Gastrointestinal hemorrhage    Polyp of colon    Right upper quadrant abdominal pain    Diarrhea    Current moderate episode of major depressive disorder without prior episode (HCC)    Hypertension    H/O mitral valve replacement with mechanical valve    H/O mechanical aortic valve replacement    Coronary artery disease involving native coronary artery of native heart without angina pectoris    Paravalvular leak (prosthetic valve)    Hx of CABG    Warfarin anticoagulation    Supratherapeutic INR    Moderate obesity    Dyspnea    Alcohol withdrawal syndrome without complication (HCC)    DTs (delirium tremens) (HCC)    Anemia    Severe protein-calorie malnutrition (HCC)    Acute decompensated heart failure (HCC)    Aortic valve disease    Mitral valve disease    AV block    Pure hypercholesterolemia    Chronic obstructive pulmonary disease (HCC)    Loculated pleural effusion    Troponin level elevated    Stage 2 chronic kidney disease    Alcohol use    Breast swelling    Pleural effusion    Hypophosphatemia    Acute congestive heart failure (HCC)    FRANTZ (acute kidney injury) (HCC)    Acidosis    Hyperglycemia    Hyperlipidemia    Alcoholic liver disease, unspecified (HCC)    Thrombocytopenia (HCC)    Delirium    Acute blood loss anemia    Heart failure with mid-range ejection fraction (HFmEF) (HCC)    Acute on chronic  reversing INR given 2 mechanical valves; goal INR 3.0 (2.5-3.5); pharmacy managing  INR down to 2.8; no warfarin since 8/8 would give small dose today  Recommend home INR device for self monitoring, he is not able to get his INR checked  On low-dose aspirin, recommend discontinuation given ongoing anemia  Pacemaker interrogation reviewed, normal device function  Had bleeding scan no active bleeding  Aggressive risk factor modification including weight loss, smoking cessation and abstinence from alcohol strongly recommended  Further care per primary service and consultants  Okay for discharge from cardiology standpoint when okay with others  Outpatient follow-up with primary cardiologist Dr. Akash Harvey MD, [facility]    NOTE: This report was transcribed using voice recognition software. Every effort was made to ensure accuracy; however, inadvertent computerized transcription errors may be present.

## 2024-08-17 NOTE — PROGRESS NOTES
Several attempts to contact Dr. Riddle thru answering service made.  Answering service is not picking up.

## 2024-08-17 NOTE — DISCHARGE SUMMARY
[facility] Hospitalist Physician Discharge Summary       Flor Bustos MD  [address]  [facility] [address]  [phone]    Call in 2 day(s)  FOLLOW-UP for Hospital stay    Timothy Perdomo DO  [address] [facility] [address]  [phone]    Call in 2 day(s)  FOR FOLLOW-UP IN 2 WEEKS      Activity level: As tolerated     Dispo: Home      Condition on discharge: Stable     Patient ID:  Len Dorsey  [id number]  59 y.o.  1965    Admit date: 8/2/2024    Discharge date and time:  8/17/2024  12:44 PM    Admission Diagnoses: Principal Problem:    Anemia  Active Problems:    H/O mitral valve replacement with mechanical valve    H/O mechanical aortic valve replacement    Supratherapeutic INR    AV block    Acute blood loss anemia    Heart failure with mid-range ejection fraction (HFmEF) (HCC)    Acute on chronic combined systolic and diastolic congestive heart failure (HCC)    Normally functioning cardiac pacemaker present    Hepatic cirrhosis (HCC)    Acute on chronic systolic (congestive) heart failure (HCC)  Resolved Problems:    * No resolved hospital problems. *      Discharge Diagnoses: Principal Problem:    Anemia  Active Problems:    H/O mitral valve replacement with mechanical valve    H/O mechanical aortic valve replacement    Supratherapeutic INR    AV block    Acute blood loss anemia    Heart failure with mid-range ejection fraction (HFmEF) (HCC)    Acute on chronic combined systolic and diastolic congestive heart failure (HCC)    Normally functioning cardiac pacemaker present    Hepatic cirrhosis (HCC)    Acute on chronic systolic (congestive) heart failure (HCC)  Resolved Problems:    * No resolved hospital problems. *      Consults:  IP CONSULT TO CARDIOLOGY  IP CONSULT TO HEM/ONC  IP CONSULT TO PHARMACY  IP CONSULT TO SOCIAL WORK  IP CONSULT TO HEART FAILURE NURSE/COORDINATOR  IP CONSULT TO NEPHROLOGY  IP CONSULT TO VASCULAR ACCESS TEAM  IP CONSULT TO VASCULAR ACCESS TEAM  IP  taking on: August 18, 2024     methocarbamol 750 MG tablet  Commonly known as: ROBAXIN  Take 1 tablet by mouth 4 times daily for 10 days     tamsulosin 0.4 MG capsule  Commonly known as: FLOMAX  Take 1 capsule by mouth at bedtime            CHANGE how you take these medications      warfarin 1 MG tablet  Commonly known as: COUMADIN  Take 1 tablet once daily  What changed:   medication strength  how much to take  how to take this  when to take this  additional instructions            CONTINUE taking these medications      atorvastatin 20 MG tablet  Commonly known as: LIPITOR  Take 1 tablet by mouth nightly     busPIRone 5 MG tablet  Commonly known as: BUSPAR  Take 1 tablet by mouth 3 times daily     folic acid 1 MG tablet  Commonly known as: FOLVITE  Take 1 tablet by mouth daily     isosorbide dinitrate 10 MG tablet  Commonly known as: ISORDIL  Take 1 tablet by mouth 3 times daily     lactulose 10 GM/15ML solution  Commonly known as: CHRONULAC  Take 30 mLs by mouth daily     metoprolol succinate 25 MG extended release tablet  Commonly known as: TOPROL XL  Take 1 tablet by mouth 2 times daily     midodrine 5 MG tablet  Commonly known as: PROAMATINE  Take 1 tablet by mouth 3 times daily (with meals)     mirtazapine 7.5 MG tablet  Commonly known as: REMERON  Take 1 tablet by mouth nightly     nicotine 21 MG/24HR  Commonly known as: NICODERM CQ  Place 1 patch onto the skin daily     pantoprazole 40 MG tablet  Commonly known as: PROTONIX  Take 1 tablet by mouth 2 times daily (before meals)     rifAXIMin 550 MG tablet  Commonly known as: XIFAXAN  Take 1 tablet by mouth 2 times daily     thiamine 100 MG tablet  Take 1 tablet by mouth daily     Vitamin D 25 MCG (1000 UT) Tabs tablet  Commonly known as: CHOLECALCIFEROL  Take 1 tablet by mouth daily            STOP taking these medications      aspirin 81 MG chewable tablet     empagliflozin 10 MG tablet  Commonly known as: JARDIANCE     hydrALAZINE 10 MG tablet  Commonly  known as: APRESOLINE               Where to Get Your Medications        These medications were sent to [facility]/pharmacy #[id number] - [facility] [address] - P [phone] - F [phone] [address] [address]      Phone: [phone]   bumetanide 2 MG tablet  methocarbamol 750 MG tablet  tamsulosin 0.4 MG capsule  warfarin 1 MG tablet           Note that more than 30 minutes was spent in preparing discharge papers, discussing discharge with patient, medication review, etc.    Signed:  Electronically signed by Peter Olivo MD on 8/17/2024 at 12:44 PM

## 2024-08-17 NOTE — PROGRESS NOTES
Pharmacy Consultation Note  (Warfarin Dosing and Monitoring)    Initial consult date: 8/2  Consulting Provider: Latanya Dorsey is a 59 y.o. male for whom pharmacy has been asked to manage warfarin therapy.     Weight:   Wt Readings from Last 1 Encounters:   08/14/24 105 kg (231 lb 7.7 oz)       TSH:    Lab Results   Component Value Date/Time    TSH 0.95 05/08/2024 02:24 AM       Hepatic Function Panel:                            Lab Results   Component Value Date/Time    ALKPHOS 108 08/16/2024 03:50 AM    ALT <5 08/16/2024 03:50 AM    AST 29 08/16/2024 03:50 AM    BILITOT 0.9 08/16/2024 03:50 AM    BILIDIR 0.3 08/11/2024 03:10 AM    IBILI 0.2 08/11/2024 03:10 AM         Recent Labs     08/16/24  0350 08/16/24  1615 08/17/24  0415   HGB 8.1* 8.0* 7.8*     Date Warfarin Dose INR Heparin or LMWH Comment   8/3 NO DOSE 5 Lovenox DC'd    8/4 NO DOSE 4 --    8/5 4 mg 3.1 --    8/6 6 mg 2.6 --    8/7 6 mg 2.6 --    8/8 2 mg 3.7 --    8/9 NO DOSE 6.0 --    8/10 NO DOSE 9.5 -- Vit K 5mg x2 PO   8/11 NO DOSE >10    -- Vitamin K 5 mg po x 1 (0520); Vitamin K 10 mg po x 1 (1229)   8/12 NO DOSE 6.1 --    8/13 1 mg - NOT GIVEN 3.7 x    8/14 NO DOSE 3.0 x    8/15 NO DOSE 3.4 x    8/16 NO DOSE 3.4 x    8/17 1 mg 2.9 X                    Assessment:  Patient is a 59 y.o. male on warfarin for Atrial Fibrillation, Mechanical Heart Valve (mitral), and Mechanical Heart Valve (atrial).  Patient's home warfarin dosing regimen is 4mg daily per med rec but patient filled 3mg tabs most recently. Per cardiology, the patient is unable to have his INR checked with appointments.    INR possibly elevated in setting of liver dysfunction.  Goal INR 2.5 - 3.5  INR 2.9    Plan:  Resume warfarin today. Per Dr. Harvey's progress note, \"no warfarin since 8/8 would give small dose today\"  Warfarin 1 mg x one today  Daily PT/INR until the INR is stable within the therapeutic range.  Pharmacist will closely follow and monitor/adjust warfarin  dosing as necessary.    Megan Thrasher RPH, PharmD, BCACP, 8/17/2024 10:27 AM  [phone]

## 2024-08-17 NOTE — PROGRESS NOTES
Leg bag applied to marks catheter.  Pt instructed on emptying bag above toilet.  Pt verbalizes understanding.

## 2024-08-26 NOTE — PROGRESS NOTES
Physician Progress Note      PATIENT:               ZAN GARZA  [facility] #:                  [id number]  :                       1965  ADMIT DATE:       2024 12:11 PM  DISCH DATE:        2024 3:49 PM  RESPONDING  PROVIDER #:        Peter Olivo MD          QUERY TEXT:    Dear Attending Physician,    Pt admitted with dyspnea on exertion, fatigue, dizziness, bilateral lower   extremity pitting edema right (2+) greater than left ( trace pitting). Pt   noted to also have tricuspid regurgitation, HTN, CKD, cardiomyopathy. If   possible, please document in progress notes and discharge summary the etiology   of CHF, if able to be determined.    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 Anemia likely MAHA from Heart Valves   ...Acute Systolic Heart Failure...\" Per Cardio 8/3,\"...Acute on chronic HFmEF   LVEF 40-45% & RV dysfunction per TTE 2024; secondary to noncompliance with   medications and diet...\" Per Cardio ,\"...Acute on chronic heart failure   with mildly reduced EF proBNP 1500 net -31 L...Cardiomyopathy EF   40-45...Complete heart block ...Moderate TR...\"  BNP 1,489  Treatment: IV Bumex gtt, IV Bumex, hold hydralazine/isosorbide dinitrate    Thank you,  Marlena Hollingsworth RN CCDS  Clinical Documentation Improvement Specialist  Phone# [phone]  Options provided:  -- CHF due to Hypertensive Heart Disease  -- CHF due to Hypertensive Heart Disease and Valvular Heart Disease  -- CHF not due to Hypertension but due to valvular heart disease  -- Other - I will add my own diagnosis  -- Disagree - Not applicable / Not valid  -- Disagree - Clinically unable to determine / Unknown  -- Refer to Clinical Documentation Reviewer    PROVIDER RESPONSE TEXT:    This patient has CHF due to

## 2025-01-01 ENCOUNTER — APPOINTMENT (OUTPATIENT)
Dept: GENERAL RADIOLOGY | Age: 60
End: 2025-01-01
Payer: MEDICARE

## 2025-01-01 ENCOUNTER — APPOINTMENT (OUTPATIENT)
Dept: CT IMAGING | Age: 60
End: 2025-01-01
Payer: MEDICARE

## 2025-01-01 ENCOUNTER — APPOINTMENT (OUTPATIENT)
Dept: ULTRASOUND IMAGING | Age: 60
End: 2025-01-01
Payer: MEDICARE

## 2025-01-01 ENCOUNTER — HOSPITAL ENCOUNTER (INPATIENT)
Age: 60
LOS: 25 days | Discharge: HOSPICE | End: 2025-06-13
Attending: EMERGENCY MEDICINE | Admitting: INTERNAL MEDICINE
Payer: MEDICARE

## 2025-01-01 VITALS
DIASTOLIC BLOOD PRESSURE: 99 MMHG | SYSTOLIC BLOOD PRESSURE: 137 MMHG | OXYGEN SATURATION: 98 % | TEMPERATURE: 97.8 F | HEIGHT: 70 IN | HEART RATE: 85 BPM | RESPIRATION RATE: 22 BRPM | WEIGHT: 270 LBS | BODY MASS INDEX: 38.65 KG/M2

## 2025-01-01 DIAGNOSIS — N17.9 AKI (ACUTE KIDNEY INJURY): Primary | ICD-10-CM

## 2025-01-01 DIAGNOSIS — F10.930 ALCOHOL WITHDRAWAL SYNDROME WITHOUT COMPLICATION (HCC): ICD-10-CM

## 2025-01-01 DIAGNOSIS — R62.7 FAILURE TO THRIVE IN ADULT: ICD-10-CM

## 2025-01-01 DIAGNOSIS — R79.1 SUPRATHERAPEUTIC INR: ICD-10-CM

## 2025-01-01 DIAGNOSIS — R56.9 SEIZURES (HCC): ICD-10-CM

## 2025-01-01 DIAGNOSIS — M79.89 SWELLING OF LEFT UPPER EXTREMITY: ICD-10-CM

## 2025-01-01 DIAGNOSIS — M79.89 LEFT UPPER EXTREMITY SWELLING: ICD-10-CM

## 2025-01-01 DIAGNOSIS — Z79.01 ANTICOAGULATED ON COUMADIN: ICD-10-CM

## 2025-01-01 LAB
1,3 BETA GLUCAN SER-MCNC: 91 PG/ML
1,3 BETA-D-GLUCAN INTERP: POSITIVE
25(OH)D3 SERPL-MCNC: 15.7 NG/ML (ref 30–100)
AADO2: 101.2 MMHG
AADO2: 102.1 MMHG
AADO2: 103.8 MMHG
AADO2: 112.2 MMHG
AADO2: 116.1 MMHG
AADO2: 116.8 MMHG
AADO2: 123.5 MMHG
AADO2: 124.1 MMHG
AADO2: 127.4 MMHG
AADO2: 127.8 MMHG
AADO2: 129.4 MMHG
AADO2: 133 MMHG
AADO2: 136 MMHG
AADO2: 147.9 MMHG
AADO2: 170.2 MMHG
AADO2: 217.3 MMHG
AADO2: 232.2 MMHG
AADO2: 232.4 MMHG
AADO2: 85.9 MMHG
AADO2: 88 MMHG
AADO2: 92.8 MMHG
AADO2: 97.4 MMHG
ABO/RH: NORMAL
ABO/RH: NORMAL
AFP SERPL-MCNC: <1.9 UG/L
ALBUMIN SERPL-MCNC: 2.7 G/DL (ref 3.5–5.2)
ALBUMIN SERPL-MCNC: 2.8 G/DL (ref 3.5–5.2)
ALBUMIN SERPL-MCNC: 3 G/DL (ref 3.5–5.2)
ALBUMIN SERPL-MCNC: 3 G/DL (ref 3.5–5.2)
ALBUMIN SERPL-MCNC: 3.1 G/DL (ref 3.5–5.2)
ALBUMIN SERPL-MCNC: 3.3 G/DL (ref 3.5–5.2)
ALBUMIN SERPL-MCNC: 3.4 G/DL (ref 3.5–5.2)
ALBUMIN SERPL-MCNC: 3.5 G/DL (ref 3.5–5.2)
ALBUMIN SERPL-MCNC: 3.6 G/DL (ref 3.5–5.2)
ALP SERPL-CCNC: 153 U/L (ref 40–129)
ALP SERPL-CCNC: 155 U/L (ref 40–129)
ALP SERPL-CCNC: 168 U/L (ref 40–129)
ALP SERPL-CCNC: 179 U/L (ref 40–129)
ALP SERPL-CCNC: 203 U/L (ref 40–129)
ALP SERPL-CCNC: 214 U/L (ref 40–129)
ALP SERPL-CCNC: 220 U/L (ref 40–129)
ALP SERPL-CCNC: 226 U/L (ref 40–129)
ALP SERPL-CCNC: 233 U/L (ref 40–129)
ALP SERPL-CCNC: 243 U/L (ref 40–129)
ALP SERPL-CCNC: 246 U/L (ref 40–129)
ALP SERPL-CCNC: 257 U/L (ref 40–129)
ALP SERPL-CCNC: 259 U/L (ref 40–129)
ALP SERPL-CCNC: 269 U/L (ref 40–129)
ALP SERPL-CCNC: 283 U/L (ref 40–129)
ALP SERPL-CCNC: 286 U/L (ref 40–129)
ALP SERPL-CCNC: 286 U/L (ref 40–129)
ALP SERPL-CCNC: 299 U/L (ref 40–129)
ALP SERPL-CCNC: 304 U/L (ref 40–129)
ALP SERPL-CCNC: 308 U/L (ref 40–129)
ALP SERPL-CCNC: 311 U/L (ref 40–129)
ALP SERPL-CCNC: 313 U/L (ref 40–129)
ALP SERPL-CCNC: 315 U/L (ref 40–129)
ALP SERPL-CCNC: 315 U/L (ref 40–129)
ALP SERPL-CCNC: 343 U/L (ref 40–129)
ALP SERPL-CCNC: 355 U/L (ref 40–129)
ALT SERPL-CCNC: 10 U/L (ref 0–50)
ALT SERPL-CCNC: 11 U/L (ref 0–40)
ALT SERPL-CCNC: 11 U/L (ref 0–50)
ALT SERPL-CCNC: 12 U/L (ref 0–40)
ALT SERPL-CCNC: 12 U/L (ref 0–50)
ALT SERPL-CCNC: 13 U/L (ref 0–40)
ALT SERPL-CCNC: 13 U/L (ref 0–50)
ALT SERPL-CCNC: 13 U/L (ref 0–50)
ALT SERPL-CCNC: 14 U/L (ref 0–50)
ALT SERPL-CCNC: 15 U/L (ref 0–40)
ALT SERPL-CCNC: 15 U/L (ref 0–50)
ALT SERPL-CCNC: 15 U/L (ref 0–50)
ALT SERPL-CCNC: 18 U/L (ref 0–50)
ALT SERPL-CCNC: 21 U/L (ref 0–50)
ALT SERPL-CCNC: 21 U/L (ref 0–50)
ALT SERPL-CCNC: 23 U/L (ref 0–50)
ALT SERPL-CCNC: 26 U/L (ref 0–40)
ALT SERPL-CCNC: 28 U/L (ref 0–50)
ALT SERPL-CCNC: 28 U/L (ref 0–50)
ALT SERPL-CCNC: 30 U/L (ref 0–50)
ALT SERPL-CCNC: 9 U/L (ref 0–40)
AMMONIA PLAS-SCNC: 25 UMOL/L (ref 16–60)
AMMONIA PLAS-SCNC: 32 UMOL/L (ref 16–60)
AMMONIA PLAS-SCNC: 33 UMOL/L (ref 16–60)
AMMONIA PLAS-SCNC: 39 UMOL/L (ref 16–60)
AMMONIA PLAS-SCNC: 45 UMOL/L (ref 16–60)
AMMONIA PLAS-SCNC: 48 UMOL/L (ref 16–60)
AMMONIA PLAS-SCNC: 50 UMOL/L (ref 16–60)
AMMONIA PLAS-SCNC: 53 UMOL/L (ref 16–60)
AMMONIA PLAS-SCNC: 59 UMOL/L (ref 16–60)
AMMONIA PLAS-SCNC: 70 UMOL/L (ref 16–60)
AMPHET UR QL SCN: NEGATIVE
AMPHET UR QL SCN: NEGATIVE
ANION GAP SERPL CALCULATED.3IONS-SCNC: 10 MMOL/L (ref 7–16)
ANION GAP SERPL CALCULATED.3IONS-SCNC: 10 MMOL/L (ref 7–16)
ANION GAP SERPL CALCULATED.3IONS-SCNC: 11 MMOL/L (ref 7–16)
ANION GAP SERPL CALCULATED.3IONS-SCNC: 12 MMOL/L (ref 7–16)
ANION GAP SERPL CALCULATED.3IONS-SCNC: 13 MMOL/L (ref 7–16)
ANION GAP SERPL CALCULATED.3IONS-SCNC: 14 MMOL/L (ref 7–16)
ANION GAP SERPL CALCULATED.3IONS-SCNC: 15 MMOL/L (ref 7–16)
ANION GAP SERPL CALCULATED.3IONS-SCNC: 16 MMOL/L (ref 7–16)
ANION GAP SERPL CALCULATED.3IONS-SCNC: 17 MMOL/L (ref 7–16)
ANION GAP SERPL CALCULATED.3IONS-SCNC: 18 MMOL/L (ref 7–16)
ANION GAP SERPL CALCULATED.3IONS-SCNC: 19 MMOL/L (ref 7–16)
ANTIBODY SCREEN: NEGATIVE
ANTIBODY SCREEN: NEGATIVE
APAP SERPL-MCNC: <5 UG/ML (ref 10–30)
ARM BAND NUMBER: NORMAL
AST SERPL-CCNC: 16 U/L (ref 0–39)
AST SERPL-CCNC: 16 U/L (ref 0–39)
AST SERPL-CCNC: 19 U/L (ref 0–39)
AST SERPL-CCNC: 23 U/L (ref 0–39)
AST SERPL-CCNC: 27 U/L (ref 0–50)
AST SERPL-CCNC: 27 U/L (ref 0–50)
AST SERPL-CCNC: 28 U/L (ref 0–50)
AST SERPL-CCNC: 29 U/L (ref 0–50)
AST SERPL-CCNC: 31 U/L (ref 0–39)
AST SERPL-CCNC: 32 U/L (ref 0–50)
AST SERPL-CCNC: 35 U/L (ref 0–50)
AST SERPL-CCNC: 36 U/L (ref 0–50)
AST SERPL-CCNC: 38 U/L (ref 0–50)
AST SERPL-CCNC: 38 U/L (ref 0–50)
AST SERPL-CCNC: 39 U/L (ref 0–50)
AST SERPL-CCNC: 40 U/L (ref 0–50)
AST SERPL-CCNC: 41 U/L (ref 0–50)
AST SERPL-CCNC: 42 U/L (ref 0–39)
AST SERPL-CCNC: 42 U/L (ref 0–50)
AST SERPL-CCNC: 44 U/L (ref 0–50)
AST SERPL-CCNC: 50 U/L (ref 0–50)
AST SERPL-CCNC: 55 U/L (ref 0–50)
B PARAP IS1001 DNA NPH QL NAA+NON-PROBE: NOT DETECTED
B PERT DNA SPEC QL NAA+PROBE: NOT DETECTED
B-OH-BUTYR SERPL-MCNC: 2.16 MMOL/L (ref 0.02–0.27)
B.E.: -0.2 MMOL/L (ref -3–3)
B.E.: -0.3 MMOL/L (ref -3–3)
B.E.: -0.3 MMOL/L (ref -3–3)
B.E.: -0.4 MMOL/L (ref -3–3)
B.E.: -0.4 MMOL/L (ref -3–3)
B.E.: -0.7 MMOL/L (ref -3–3)
B.E.: -0.8 MMOL/L (ref -3–3)
B.E.: -1.1 MMOL/L (ref -3–3)
B.E.: -1.1 MMOL/L (ref -3–3)
B.E.: -1.2 MMOL/L (ref -3–3)
B.E.: -1.4 MMOL/L (ref -3–3)
B.E.: -1.7 MMOL/L (ref -3–3)
B.E.: -1.7 MMOL/L (ref -3–3)
B.E.: -10 MMOL/L (ref -3–3)
B.E.: -10.2 MMOL/L (ref -3–3)
B.E.: -10.7 MMOL/L (ref -3–3)
B.E.: -3 MMOL/L (ref -3–3)
B.E.: -6.4 MMOL/L (ref -3–3)
B.E.: -6.4 MMOL/L (ref -3–3)
B.E.: -6.5 MMOL/L (ref -3–3)
B.E.: -7.8 MMOL/L (ref -3–3)
B.E.: -8.5 MMOL/L (ref -3–3)
B.E.: 0 MMOL/L (ref -3–3)
B.E.: 0.3 MMOL/L (ref -3–3)
B.E.: 0.6 MMOL/L (ref -3–3)
B.E.: 0.9 MMOL/L (ref -3–3)
B.E.: 1 MMOL/L (ref -3–3)
B.E.: 1 MMOL/L (ref -3–3)
B.E.: 1.1 MMOL/L (ref -3–3)
B.E.: 1.8 MMOL/L (ref -3–3)
B.E.: 1.9 MMOL/L (ref -3–3)
B.E.: 2.7 MMOL/L (ref -3–3)
B.E.: 3.9 MMOL/L (ref -3–3)
B.E.: 4.2 MMOL/L (ref -3–3)
BACTERIA URNS QL MICRO: ABNORMAL
BACTERIA URNS QL MICRO: ABNORMAL
BARBITURATES UR QL SCN: NEGATIVE
BARBITURATES UR QL SCN: NEGATIVE
BASOPHILS # BLD: 0 K/UL (ref 0–0.2)
BASOPHILS # BLD: 0.01 K/UL (ref 0–0.2)
BASOPHILS # BLD: 0.02 K/UL (ref 0–0.2)
BASOPHILS # BLD: 0.02 K/UL (ref 0–0.2)
BASOPHILS # BLD: 0.03 K/UL (ref 0–0.2)
BASOPHILS # BLD: 0.05 K/UL (ref 0–0.2)
BASOPHILS # BLD: 0.06 K/UL (ref 0–0.2)
BASOPHILS # BLD: 0.07 K/UL (ref 0–0.2)
BASOPHILS # BLD: 0.09 K/UL (ref 0–0.2)
BASOPHILS # BLD: 0.1 K/UL (ref 0–0.2)
BASOPHILS # BLD: 0.11 K/UL (ref 0–0.2)
BASOPHILS # BLD: 0.33 K/UL (ref 0–0.2)
BASOPHILS NFR BLD: 0 % (ref 0–2)
BASOPHILS NFR BLD: 1 % (ref 0–2)
BASOPHILS NFR BLD: 3 % (ref 0–2)
BENZODIAZ UR QL: NEGATIVE
BENZODIAZ UR QL: POSITIVE
BILIRUB DIRECT SERPL-MCNC: 0.6 MG/DL (ref 0–0.3)
BILIRUB DIRECT SERPL-MCNC: 0.7 MG/DL (ref 0–0.3)
BILIRUB DIRECT SERPL-MCNC: 0.8 MG/DL (ref 0–0.3)
BILIRUB DIRECT SERPL-MCNC: 1 MG/DL (ref 0–0.2)
BILIRUB DIRECT SERPL-MCNC: 1.1 MG/DL (ref 0–0.2)
BILIRUB DIRECT SERPL-MCNC: 1.2 MG/DL (ref 0–0.3)
BILIRUB DIRECT SERPL-MCNC: 1.3 MG/DL (ref 0–0.2)
BILIRUB DIRECT SERPL-MCNC: 1.5 MG/DL (ref 0–0.2)
BILIRUB DIRECT SERPL-MCNC: 1.5 MG/DL (ref 0–0.2)
BILIRUB DIRECT SERPL-MCNC: 1.7 MG/DL (ref 0–0.2)
BILIRUB INDIRECT SERPL-MCNC: 0.5 MG/DL (ref 0–1)
BILIRUB INDIRECT SERPL-MCNC: 0.6 MG/DL (ref 0–1)
BILIRUB INDIRECT SERPL-MCNC: 0.7 MG/DL (ref 0–1)
BILIRUB INDIRECT SERPL-MCNC: 0.9 MG/DL (ref 0–1)
BILIRUB INDIRECT SERPL-MCNC: 0.9 MG/DL (ref 0–1)
BILIRUB INDIRECT SERPL-MCNC: 1 MG/DL (ref 0–1)
BILIRUB SERPL-MCNC: 1.1 MG/DL (ref 0–1.2)
BILIRUB SERPL-MCNC: 1.2 MG/DL (ref 0–1.2)
BILIRUB SERPL-MCNC: 1.3 MG/DL (ref 0–1.2)
BILIRUB SERPL-MCNC: 1.4 MG/DL (ref 0–1.2)
BILIRUB SERPL-MCNC: 1.4 MG/DL (ref 0–1.2)
BILIRUB SERPL-MCNC: 1.5 MG/DL (ref 0–1.2)
BILIRUB SERPL-MCNC: 1.6 MG/DL (ref 0–1.2)
BILIRUB SERPL-MCNC: 1.7 MG/DL (ref 0–1.2)
BILIRUB SERPL-MCNC: 1.8 MG/DL (ref 0–1.2)
BILIRUB SERPL-MCNC: 1.9 MG/DL (ref 0–1.2)
BILIRUB SERPL-MCNC: 2 MG/DL (ref 0–1.2)
BILIRUB SERPL-MCNC: 2 MG/DL (ref 0–1.2)
BILIRUB SERPL-MCNC: 2.1 MG/DL (ref 0–1.2)
BILIRUB SERPL-MCNC: 2.1 MG/DL (ref 0–1.2)
BILIRUB SERPL-MCNC: 2.4 MG/DL (ref 0–1.2)
BILIRUB SERPL-MCNC: 2.7 MG/DL (ref 0–1.2)
BILIRUB UR QL STRIP: ABNORMAL
BILIRUB UR QL STRIP: NEGATIVE
BLOOD BANK BLOOD PRODUCT EXPIRATION DATE: NORMAL
BLOOD BANK BLOOD PRODUCT EXPIRATION DATE: NORMAL
BLOOD BANK DISPENSE STATUS: NORMAL
BLOOD BANK ISBT PRODUCT BLOOD TYPE: 9500
BLOOD BANK ISBT PRODUCT BLOOD TYPE: 9500
BLOOD BANK PRODUCT CODE: NORMAL
BLOOD BANK PRODUCT CODE: NORMAL
BLOOD BANK SAMPLE EXPIRATION: NORMAL
BLOOD BANK SAMPLE EXPIRATION: NORMAL
BLOOD BANK UNIT TYPE AND RH: NORMAL
BLOOD BANK UNIT TYPE AND RH: NORMAL
BNP SERPL-MCNC: 5566 PG/ML (ref 0–125)
BNP SERPL-MCNC: 6575 PG/ML (ref 0–125)
BNP SERPL-MCNC: 7050 PG/ML (ref 0–125)
BNP SERPL-MCNC: ABNORMAL PG/ML (ref 0–125)
BNP SERPL-MCNC: ABNORMAL PG/ML (ref 0–125)
BPU ID: NORMAL
BUN SERPL-MCNC: 22 MG/DL (ref 6–20)
BUN SERPL-MCNC: 23 MG/DL (ref 6–20)
BUN SERPL-MCNC: 25 MG/DL (ref 6–20)
BUN SERPL-MCNC: 26 MG/DL (ref 6–20)
BUN SERPL-MCNC: 27 MG/DL (ref 6–20)
BUN SERPL-MCNC: 30 MG/DL (ref 6–20)
BUN SERPL-MCNC: 33 MG/DL (ref 6–20)
BUN SERPL-MCNC: 34 MG/DL (ref 6–20)
BUN SERPL-MCNC: 34 MG/DL (ref 6–20)
BUN SERPL-MCNC: 35 MG/DL (ref 6–20)
BUN SERPL-MCNC: 36 MG/DL (ref 6–20)
BUN SERPL-MCNC: 36 MG/DL (ref 6–20)
BUN SERPL-MCNC: 37 MG/DL (ref 6–20)
BUN SERPL-MCNC: 37 MG/DL (ref 6–20)
BUN SERPL-MCNC: 38 MG/DL (ref 6–20)
BUN SERPL-MCNC: 39 MG/DL (ref 6–20)
BUN SERPL-MCNC: 41 MG/DL (ref 6–20)
BUN SERPL-MCNC: 41 MG/DL (ref 6–20)
BUN SERPL-MCNC: 43 MG/DL (ref 6–20)
BUN SERPL-MCNC: 43 MG/DL (ref 6–20)
BUN SERPL-MCNC: 44 MG/DL (ref 6–20)
BUN SERPL-MCNC: 46 MG/DL (ref 6–20)
BUN SERPL-MCNC: 47 MG/DL (ref 6–20)
BUN SERPL-MCNC: 48 MG/DL (ref 6–20)
BUN SERPL-MCNC: 49 MG/DL (ref 6–20)
BUN SERPL-MCNC: 52 MG/DL (ref 6–20)
BUN SERPL-MCNC: 56 MG/DL (ref 6–20)
BUN SERPL-MCNC: 57 MG/DL (ref 6–20)
BUN SERPL-MCNC: 57 MG/DL (ref 6–20)
BUN SERPL-MCNC: 60 MG/DL (ref 6–20)
BUN SERPL-MCNC: 61 MG/DL (ref 6–20)
BUN SERPL-MCNC: 63 MG/DL (ref 6–20)
BUPRENORPHINE UR QL: NEGATIVE
BUPRENORPHINE UR QL: NEGATIVE
C PNEUM DNA NPH QL NAA+NON-PROBE: NOT DETECTED
CALCIUM SERPL-MCNC: 7.8 MG/DL (ref 8.6–10)
CALCIUM SERPL-MCNC: 8.3 MG/DL (ref 8.6–10)
CALCIUM SERPL-MCNC: 8.3 MG/DL (ref 8.6–10)
CALCIUM SERPL-MCNC: 8.4 MG/DL (ref 8.6–10)
CALCIUM SERPL-MCNC: 8.5 MG/DL (ref 8.6–10)
CALCIUM SERPL-MCNC: 8.5 MG/DL (ref 8.6–10)
CALCIUM SERPL-MCNC: 8.6 MG/DL (ref 8.6–10)
CALCIUM SERPL-MCNC: 8.6 MG/DL (ref 8.6–10)
CALCIUM SERPL-MCNC: 8.7 MG/DL (ref 8.6–10)
CALCIUM SERPL-MCNC: 8.8 MG/DL (ref 8.6–10)
CALCIUM SERPL-MCNC: 8.9 MG/DL (ref 8.6–10)
CALCIUM SERPL-MCNC: 8.9 MG/DL (ref 8.6–10.2)
CALCIUM SERPL-MCNC: 8.9 MG/DL (ref 8.6–10.2)
CALCIUM SERPL-MCNC: 9 MG/DL (ref 8.6–10)
CALCIUM SERPL-MCNC: 9 MG/DL (ref 8.6–10.2)
CALCIUM SERPL-MCNC: 9.1 MG/DL (ref 8.6–10)
CALCIUM SERPL-MCNC: 9.1 MG/DL (ref 8.6–10.2)
CALCIUM SERPL-MCNC: 9.2 MG/DL (ref 8.6–10)
CALCIUM SERPL-MCNC: 9.3 MG/DL (ref 8.6–10)
CALCIUM SERPL-MCNC: 9.3 MG/DL (ref 8.6–10.2)
CALCIUM SERPL-MCNC: 9.3 MG/DL (ref 8.6–10.2)
CALCIUM SERPL-MCNC: 9.4 MG/DL (ref 8.6–10.2)
CALCIUM SERPL-MCNC: 9.4 MG/DL (ref 8.6–10.2)
CALCIUM SERPL-MCNC: 9.5 MG/DL (ref 8.6–10.2)
CALCIUM SERPL-MCNC: 9.5 MG/DL (ref 8.6–10.2)
CALCIUM SERPL-MCNC: 9.6 MG/DL (ref 8.6–10.2)
CALCIUM SERPL-MCNC: 9.7 MG/DL (ref 8.6–10)
CANNABINOIDS UR QL SCN: NEGATIVE
CANNABINOIDS UR QL SCN: NEGATIVE
CHLORIDE SERPL-SCNC: 104 MMOL/L (ref 98–107)
CHLORIDE SERPL-SCNC: 105 MMOL/L (ref 98–107)
CHLORIDE SERPL-SCNC: 106 MMOL/L (ref 98–107)
CHLORIDE SERPL-SCNC: 107 MMOL/L (ref 98–107)
CHLORIDE SERPL-SCNC: 108 MMOL/L (ref 98–107)
CHLORIDE SERPL-SCNC: 109 MMOL/L (ref 98–107)
CHLORIDE SERPL-SCNC: 110 MMOL/L (ref 98–107)
CHLORIDE SERPL-SCNC: 110 MMOL/L (ref 98–107)
CHLORIDE SERPL-SCNC: 111 MMOL/L (ref 98–107)
CHLORIDE SERPL-SCNC: 112 MMOL/L (ref 98–107)
CHLORIDE SERPL-SCNC: 113 MMOL/L (ref 98–107)
CHLORIDE SERPL-SCNC: 114 MMOL/L (ref 98–107)
CK SERPL-CCNC: 78 U/L (ref 0–190)
CLARITY UR: ABNORMAL
CLARITY UR: CLEAR
CLOT ANGLE.KAOLIN INDUCED BLD RES TEG: 71.5 DEG (ref 53–70)
CLOT ANGLE.KAOLIN INDUCED BLD RES TEG: 76.6 DEG (ref 53–70)
CO2 SERPL-SCNC: 17 MMOL/L (ref 22–29)
CO2 SERPL-SCNC: 17 MMOL/L (ref 22–29)
CO2 SERPL-SCNC: 18 MMOL/L (ref 22–29)
CO2 SERPL-SCNC: 18 MMOL/L (ref 22–29)
CO2 SERPL-SCNC: 19 MMOL/L (ref 22–29)
CO2 SERPL-SCNC: 19 MMOL/L (ref 22–29)
CO2 SERPL-SCNC: 20 MMOL/L (ref 22–29)
CO2 SERPL-SCNC: 21 MMOL/L (ref 22–29)
CO2 SERPL-SCNC: 22 MMOL/L (ref 22–29)
CO2 SERPL-SCNC: 23 MMOL/L (ref 22–29)
CO2 SERPL-SCNC: 24 MMOL/L (ref 22–29)
CO2 SERPL-SCNC: 25 MMOL/L (ref 22–29)
CO2 SERPL-SCNC: 26 MMOL/L (ref 22–29)
CO2 SERPL-SCNC: 27 MMOL/L (ref 22–29)
CO2 SERPL-SCNC: 28 MMOL/L (ref 22–29)
CO2 SERPL-SCNC: 29 MMOL/L (ref 22–29)
COCAINE UR QL SCN: NEGATIVE
COCAINE UR QL SCN: NEGATIVE
COHB: 0.8 % (ref 0–1.5)
COHB: 0.8 % (ref 0–1.5)
COHB: 0.9 % (ref 0–1.5)
COHB: 1.1 % (ref 0–1.5)
COHB: 1.2 % (ref 0–1.5)
COHB: 1.3 % (ref 0–1.5)
COHB: 1.4 % (ref 0–1.5)
COHB: 1.5 % (ref 0–1.5)
COHB: 1.6 % (ref 0–1.5)
COHB: 1.8 % (ref 0–1.5)
COHB: 1.9 % (ref 0–1.5)
COLOR UR: YELLOW
COLOR UR: YELLOW
COMMENT: ABNORMAL
COMMENT: ABNORMAL
COMPONENT: NORMAL
CORTIS SERPL-MCNC: 8.5 UG/DL (ref 2.7–18.4)
CORTIS SERPL-MCNC: 8.8 UG/DL (ref 2.7–18.4)
CORTISOL COLLECTION INFO: NORMAL
CORTISOL COLLECTION INFO: NORMAL
CREAT SERPL-MCNC: 1.3 MG/DL (ref 0.7–1.2)
CREAT SERPL-MCNC: 1.4 MG/DL (ref 0.7–1.2)
CREAT SERPL-MCNC: 1.5 MG/DL (ref 0.7–1.2)
CREAT SERPL-MCNC: 1.6 MG/DL (ref 0.7–1.2)
CREAT SERPL-MCNC: 1.6 MG/DL (ref 0.7–1.2)
CREAT SERPL-MCNC: 1.7 MG/DL (ref 0.7–1.2)
CREAT SERPL-MCNC: 1.7 MG/DL (ref 0.7–1.2)
CREAT SERPL-MCNC: 1.8 MG/DL (ref 0.7–1.2)
CREAT SERPL-MCNC: 1.9 MG/DL (ref 0.7–1.2)
CREAT SERPL-MCNC: 2 MG/DL (ref 0.7–1.2)
CREAT SERPL-MCNC: 2.1 MG/DL (ref 0.7–1.2)
CREAT SERPL-MCNC: 2.1 MG/DL (ref 0.7–1.2)
CREAT SERPL-MCNC: 2.2 MG/DL (ref 0.7–1.2)
CREAT SERPL-MCNC: 2.4 MG/DL (ref 0.7–1.2)
CREAT SERPL-MCNC: 2.4 MG/DL (ref 0.7–1.2)
CREAT SERPL-MCNC: 2.5 MG/DL (ref 0.7–1.2)
CREAT SERPL-MCNC: 2.6 MG/DL (ref 0.7–1.2)
CREAT SERPL-MCNC: 2.7 MG/DL (ref 0.7–1.2)
CREAT SERPL-MCNC: 2.8 MG/DL (ref 0.7–1.2)
CREAT SERPL-MCNC: 2.9 MG/DL (ref 0.7–1.2)
CREAT SERPL-MCNC: 3 MG/DL (ref 0.7–1.2)
CREAT UR-MCNC: 142 MG/DL (ref 40–278)
CRITICAL ACTION: NORMAL
CRITICAL NOTIFICATION DATE/TIME: NORMAL
CRITICAL NOTIFICATION: NORMAL
CRITICAL VALUE READ BACK: YES
CRITICAL: ABNORMAL
CROSSMATCH RESULT: NORMAL
DATE ANALYZED: ABNORMAL
DATE LAST DOSE: NORMAL
DATE OF COLLECTION: ABNORMAL
EKG ATRIAL RATE: 107 BPM
EKG ATRIAL RATE: 90 BPM
EKG P AXIS: 189 DEGREES
EKG P-R INTERVAL: 200 MS
EKG Q-T INTERVAL: 372 MS
EKG Q-T INTERVAL: 454 MS
EKG QRS DURATION: 148 MS
EKG QRS DURATION: 152 MS
EKG QTC CALCULATION (BAZETT): 486 MS
EKG QTC CALCULATION (BAZETT): 496 MS
EKG R AXIS: -43 DEGREES
EKG R AXIS: -56 DEGREES
EKG T AXIS: 128 DEGREES
EKG T AXIS: 93 DEGREES
EKG VENTRICULAR RATE: 107 BPM
EKG VENTRICULAR RATE: 69 BPM
EOSINOPHIL # BLD: 0 K/UL (ref 0.05–0.5)
EOSINOPHIL # BLD: 0.06 K/UL (ref 0.05–0.5)
EOSINOPHIL # BLD: 0.14 K/UL (ref 0.05–0.5)
EOSINOPHIL # BLD: 0.19 K/UL (ref 0.05–0.5)
EOSINOPHIL # BLD: 0.22 K/UL (ref 0.05–0.5)
EOSINOPHIL # BLD: 0.23 K/UL (ref 0.05–0.5)
EOSINOPHIL # BLD: 0.35 K/UL (ref 0.05–0.5)
EOSINOPHIL # BLD: 0.39 K/UL (ref 0.05–0.5)
EOSINOPHIL # BLD: 0.44 K/UL (ref 0.05–0.5)
EOSINOPHIL # BLD: 0.46 K/UL (ref 0.05–0.5)
EOSINOPHIL # BLD: 0.48 K/UL (ref 0.05–0.5)
EOSINOPHIL # BLD: 0.62 K/UL (ref 0.05–0.5)
EOSINOPHIL # BLD: 0.66 K/UL (ref 0.05–0.5)
EOSINOPHIL # BLD: 0.69 K/UL (ref 0.05–0.5)
EOSINOPHIL # BLD: 0.7 K/UL (ref 0.05–0.5)
EOSINOPHIL # BLD: 0.7 K/UL (ref 0.05–0.5)
EOSINOPHIL # BLD: 0.79 K/UL (ref 0.05–0.5)
EOSINOPHIL # BLD: 0.91 K/UL (ref 0.05–0.5)
EOSINOPHILS RELATIVE PERCENT: 0 % (ref 0–6)
EOSINOPHILS RELATIVE PERCENT: 1 % (ref 0–6)
EOSINOPHILS RELATIVE PERCENT: 1 % (ref 0–6)
EOSINOPHILS RELATIVE PERCENT: 2 % (ref 0–6)
EOSINOPHILS RELATIVE PERCENT: 3 % (ref 0–6)
EOSINOPHILS RELATIVE PERCENT: 4 % (ref 0–6)
EOSINOPHILS RELATIVE PERCENT: 5 % (ref 0–6)
EOSINOPHILS RELATIVE PERCENT: 7 % (ref 0–6)
EOSINOPHILS RELATIVE PERCENT: 8 % (ref 0–6)
EPI CELLS #/AREA URNS HPF: ABNORMAL /HPF
EPL-TEG: 0 % (ref 0–15)
EPL-TEG: 0.1 % (ref 0–15)
ERYTHROCYTE [DISTWIDTH] IN BLOOD BY AUTOMATED COUNT: 18.3 % (ref 11.5–15)
ERYTHROCYTE [DISTWIDTH] IN BLOOD BY AUTOMATED COUNT: 18.4 % (ref 11.5–15)
ERYTHROCYTE [DISTWIDTH] IN BLOOD BY AUTOMATED COUNT: 18.5 % (ref 11.5–15)
ERYTHROCYTE [DISTWIDTH] IN BLOOD BY AUTOMATED COUNT: 18.6 % (ref 11.5–15)
ERYTHROCYTE [DISTWIDTH] IN BLOOD BY AUTOMATED COUNT: 18.6 % (ref 11.5–15)
ERYTHROCYTE [DISTWIDTH] IN BLOOD BY AUTOMATED COUNT: 18.7 % (ref 11.5–15)
ERYTHROCYTE [DISTWIDTH] IN BLOOD BY AUTOMATED COUNT: 19.3 % (ref 11.5–15)
ERYTHROCYTE [DISTWIDTH] IN BLOOD BY AUTOMATED COUNT: 19.4 % (ref 11.5–15)
ERYTHROCYTE [DISTWIDTH] IN BLOOD BY AUTOMATED COUNT: 19.9 % (ref 11.5–15)
ERYTHROCYTE [DISTWIDTH] IN BLOOD BY AUTOMATED COUNT: 20.6 % (ref 11.5–15)
ERYTHROCYTE [DISTWIDTH] IN BLOOD BY AUTOMATED COUNT: 21.5 % (ref 11.5–15)
ERYTHROCYTE [DISTWIDTH] IN BLOOD BY AUTOMATED COUNT: 22.4 % (ref 11.5–15)
ERYTHROCYTE [DISTWIDTH] IN BLOOD BY AUTOMATED COUNT: 22.7 % (ref 11.5–15)
ERYTHROCYTE [DISTWIDTH] IN BLOOD BY AUTOMATED COUNT: 22.8 % (ref 11.5–15)
ERYTHROCYTE [DISTWIDTH] IN BLOOD BY AUTOMATED COUNT: 23.1 % (ref 11.5–15)
ERYTHROCYTE [DISTWIDTH] IN BLOOD BY AUTOMATED COUNT: 23.4 % (ref 11.5–15)
ERYTHROCYTE [DISTWIDTH] IN BLOOD BY AUTOMATED COUNT: 23.5 % (ref 11.5–15)
ERYTHROCYTE [DISTWIDTH] IN BLOOD BY AUTOMATED COUNT: 23.5 % (ref 11.5–15)
ERYTHROCYTE [DISTWIDTH] IN BLOOD BY AUTOMATED COUNT: 23.6 % (ref 11.5–15)
ERYTHROCYTE [DISTWIDTH] IN BLOOD BY AUTOMATED COUNT: 23.7 % (ref 11.5–15)
ERYTHROCYTE [DISTWIDTH] IN BLOOD BY AUTOMATED COUNT: 23.7 % (ref 11.5–15)
ERYTHROCYTE [DISTWIDTH] IN BLOOD BY AUTOMATED COUNT: 23.8 % (ref 11.5–15)
ERYTHROCYTE [DISTWIDTH] IN BLOOD BY AUTOMATED COUNT: 23.8 % (ref 11.5–15)
ERYTHROCYTE [DISTWIDTH] IN BLOOD BY AUTOMATED COUNT: 23.9 % (ref 11.5–15)
ERYTHROCYTE [DISTWIDTH] IN BLOOD BY AUTOMATED COUNT: 23.9 % (ref 11.5–15)
ERYTHROCYTE [DISTWIDTH] IN BLOOD BY AUTOMATED COUNT: 24 % (ref 11.5–15)
ERYTHROCYTE [DISTWIDTH] IN BLOOD BY AUTOMATED COUNT: 24.5 % (ref 11.5–15)
ERYTHROCYTE [DISTWIDTH] IN BLOOD BY AUTOMATED COUNT: 24.8 % (ref 11.5–15)
ERYTHROCYTE [DISTWIDTH] IN BLOOD BY AUTOMATED COUNT: 25 % (ref 11.5–15)
ERYTHROCYTE [DISTWIDTH] IN BLOOD BY AUTOMATED COUNT: 25.2 % (ref 11.5–15)
ETHANOLAMINE SERPL-MCNC: <10 MG/DL (ref 0–0.08)
FENTANYL UR QL: NEGATIVE
FENTANYL UR QL: NEGATIVE
FERRITIN SERPL-MCNC: 64 NG/ML
FIO2: 35 %
FIO2: 40
FIO2: 40
FIO2: 40 %
FIO2: 45 %
FIO2: 50 %
FLOW RATE: 3
FLUAV RNA NPH QL NAA+NON-PROBE: NOT DETECTED
FLUBV RNA NPH QL NAA+NON-PROBE: NOT DETECTED
FOLATE SERPL-MCNC: 10.2 NG/ML (ref 4.6–34.8)
G-TEG: 12.7 KDYN/CM2 (ref 4.5–11)
G-TEG: 13.9 KDYN/CM2 (ref 4.5–11)
GFR, ESTIMATED: 24 ML/MIN/1.73M2
GFR, ESTIMATED: 25 ML/MIN/1.73M2
GFR, ESTIMATED: 26 ML/MIN/1.73M2
GFR, ESTIMATED: 27 ML/MIN/1.73M2
GFR, ESTIMATED: 29 ML/MIN/1.73M2
GFR, ESTIMATED: 30 ML/MIN/1.73M2
GFR, ESTIMATED: 30 ML/MIN/1.73M2
GFR, ESTIMATED: 34 ML/MIN/1.73M2
GFR, ESTIMATED: 36 ML/MIN/1.73M2
GFR, ESTIMATED: 36 ML/MIN/1.73M2
GFR, ESTIMATED: 37 ML/MIN/1.73M2
GFR, ESTIMATED: 38 ML/MIN/1.73M2
GFR, ESTIMATED: 39 ML/MIN/1.73M2
GFR, ESTIMATED: 40 ML/MIN/1.73M2
GFR, ESTIMATED: 42 ML/MIN/1.73M2
GFR, ESTIMATED: 43 ML/MIN/1.73M2
GFR, ESTIMATED: 45 ML/MIN/1.73M2
GFR, ESTIMATED: 48 ML/MIN/1.73M2
GFR, ESTIMATED: 49 ML/MIN/1.73M2
GFR, ESTIMATED: 49 ML/MIN/1.73M2
GFR, ESTIMATED: 52 ML/MIN/1.73M2
GFR, ESTIMATED: 52 ML/MIN/1.73M2
GFR, ESTIMATED: 54 ML/MIN/1.73M2
GFR, ESTIMATED: 55 ML/MIN/1.73M2
GFR, ESTIMATED: 57 ML/MIN/1.73M2
GFR, ESTIMATED: 59 ML/MIN/1.73M2
GFR, ESTIMATED: 61 ML/MIN/1.73M2
GFR, ESTIMATED: 61 ML/MIN/1.73M2
GLUCOSE BLD-MCNC: 104 MG/DL (ref 74–99)
GLUCOSE BLD-MCNC: 130 MG/DL (ref 74–99)
GLUCOSE BLD-MCNC: 149 MG/DL (ref 74–99)
GLUCOSE BLD-MCNC: 69 MG/DL (ref 74–99)
GLUCOSE BLD-MCNC: 84 MG/DL (ref 74–99)
GLUCOSE SERPL-MCNC: 107 MG/DL (ref 74–99)
GLUCOSE SERPL-MCNC: 110 MG/DL (ref 74–99)
GLUCOSE SERPL-MCNC: 111 MG/DL (ref 74–99)
GLUCOSE SERPL-MCNC: 111 MG/DL (ref 74–99)
GLUCOSE SERPL-MCNC: 112 MG/DL (ref 74–99)
GLUCOSE SERPL-MCNC: 114 MG/DL (ref 74–99)
GLUCOSE SERPL-MCNC: 114 MG/DL (ref 74–99)
GLUCOSE SERPL-MCNC: 115 MG/DL (ref 74–99)
GLUCOSE SERPL-MCNC: 115 MG/DL (ref 74–99)
GLUCOSE SERPL-MCNC: 116 MG/DL (ref 74–99)
GLUCOSE SERPL-MCNC: 117 MG/DL (ref 74–99)
GLUCOSE SERPL-MCNC: 118 MG/DL (ref 74–99)
GLUCOSE SERPL-MCNC: 120 MG/DL (ref 74–99)
GLUCOSE SERPL-MCNC: 120 MG/DL (ref 74–99)
GLUCOSE SERPL-MCNC: 121 MG/DL (ref 74–99)
GLUCOSE SERPL-MCNC: 122 MG/DL (ref 74–99)
GLUCOSE SERPL-MCNC: 122 MG/DL (ref 74–99)
GLUCOSE SERPL-MCNC: 125 MG/DL (ref 74–99)
GLUCOSE SERPL-MCNC: 125 MG/DL (ref 74–99)
GLUCOSE SERPL-MCNC: 126 MG/DL (ref 74–99)
GLUCOSE SERPL-MCNC: 126 MG/DL (ref 74–99)
GLUCOSE SERPL-MCNC: 127 MG/DL (ref 74–99)
GLUCOSE SERPL-MCNC: 127 MG/DL (ref 74–99)
GLUCOSE SERPL-MCNC: 129 MG/DL (ref 74–99)
GLUCOSE SERPL-MCNC: 131 MG/DL (ref 74–99)
GLUCOSE SERPL-MCNC: 134 MG/DL (ref 74–99)
GLUCOSE SERPL-MCNC: 136 MG/DL (ref 74–99)
GLUCOSE SERPL-MCNC: 137 MG/DL (ref 74–99)
GLUCOSE SERPL-MCNC: 138 MG/DL (ref 74–99)
GLUCOSE SERPL-MCNC: 139 MG/DL (ref 74–99)
GLUCOSE SERPL-MCNC: 141 MG/DL (ref 74–99)
GLUCOSE SERPL-MCNC: 144 MG/DL (ref 74–99)
GLUCOSE SERPL-MCNC: 149 MG/DL (ref 74–99)
GLUCOSE SERPL-MCNC: 152 MG/DL (ref 74–99)
GLUCOSE SERPL-MCNC: 154 MG/DL (ref 74–99)
GLUCOSE SERPL-MCNC: 160 MG/DL (ref 74–99)
GLUCOSE SERPL-MCNC: 179 MG/DL (ref 74–99)
GLUCOSE SERPL-MCNC: 82 MG/DL (ref 74–99)
GLUCOSE SERPL-MCNC: 83 MG/DL (ref 74–99)
GLUCOSE SERPL-MCNC: 83 MG/DL (ref 74–99)
GLUCOSE SERPL-MCNC: 89 MG/DL (ref 74–99)
GLUCOSE SERPL-MCNC: 97 MG/DL (ref 74–99)
GLUCOSE SERPL-MCNC: 98 MG/DL (ref 74–99)
GLUCOSE UR STRIP-MCNC: NEGATIVE MG/DL
GLUCOSE UR STRIP-MCNC: NEGATIVE MG/DL
HADV DNA NPH QL NAA+NON-PROBE: NOT DETECTED
HCO3, MIXED: 27.7 MMOL/L
HCO3: 16.3 MMOL/L (ref 22–26)
HCO3: 16.4 MMOL/L (ref 22–26)
HCO3: 17 MMOL/L (ref 22–26)
HCO3: 18.6 MMOL/L (ref 22–26)
HCO3: 18.9 MMOL/L (ref 22–26)
HCO3: 19.5 MMOL/L (ref 22–26)
HCO3: 19.6 MMOL/L (ref 22–26)
HCO3: 20.2 MMOL/L (ref 22–26)
HCO3: 22.5 MMOL/L (ref 22–26)
HCO3: 22.7 MMOL/L (ref 22–26)
HCO3: 23 MMOL/L (ref 22–26)
HCO3: 23.2 MMOL/L (ref 22–26)
HCO3: 23.3 MMOL/L (ref 22–26)
HCO3: 23.5 MMOL/L (ref 22–26)
HCO3: 23.7 MMOL/L (ref 22–26)
HCO3: 23.7 MMOL/L (ref 22–26)
HCO3: 23.8 MMOL/L (ref 22–26)
HCO3: 24 MMOL/L (ref 22–26)
HCO3: 24.1 MMOL/L (ref 22–26)
HCO3: 24.4 MMOL/L (ref 22–26)
HCO3: 24.4 MMOL/L (ref 22–26)
HCO3: 24.5 MMOL/L (ref 22–26)
HCO3: 24.5 MMOL/L (ref 22–26)
HCO3: 24.6 MMOL/L (ref 22–26)
HCO3: 24.7 MMOL/L (ref 22–26)
HCO3: 24.9 MMOL/L (ref 22–26)
HCO3: 24.9 MMOL/L (ref 22–26)
HCO3: 25.4 MMOL/L (ref 22–26)
HCO3: 25.5 MMOL/L (ref 22–26)
HCO3: 26 MMOL/L (ref 22–26)
HCO3: 27.1 MMOL/L (ref 22–26)
HCO3: 28.6 MMOL/L (ref 22–26)
HCO3: 29.7 MMOL/L (ref 22–26)
HCO3: 30.9 MMOL/L (ref 22–26)
HCOV 229E RNA NPH QL NAA+NON-PROBE: NOT DETECTED
HCOV HKU1 RNA NPH QL NAA+NON-PROBE: NOT DETECTED
HCOV NL63 RNA NPH QL NAA+NON-PROBE: NOT DETECTED
HCOV OC43 RNA NPH QL NAA+NON-PROBE: NOT DETECTED
HCT VFR BLD AUTO: 22.7 % (ref 37–54)
HCT VFR BLD AUTO: 23.6 % (ref 37–54)
HCT VFR BLD AUTO: 23.8 % (ref 37–54)
HCT VFR BLD AUTO: 24.3 % (ref 37–54)
HCT VFR BLD AUTO: 24.4 % (ref 37–54)
HCT VFR BLD AUTO: 24.5 % (ref 37–54)
HCT VFR BLD AUTO: 24.9 % (ref 37–54)
HCT VFR BLD AUTO: 25 % (ref 37–54)
HCT VFR BLD AUTO: 25.1 % (ref 37–54)
HCT VFR BLD AUTO: 25.1 % (ref 37–54)
HCT VFR BLD AUTO: 25.7 % (ref 37–54)
HCT VFR BLD AUTO: 25.9 % (ref 37–54)
HCT VFR BLD AUTO: 26.2 % (ref 37–54)
HCT VFR BLD AUTO: 26.3 % (ref 37–54)
HCT VFR BLD AUTO: 26.4 % (ref 37–54)
HCT VFR BLD AUTO: 26.5 % (ref 37–54)
HCT VFR BLD AUTO: 26.5 % (ref 37–54)
HCT VFR BLD AUTO: 26.6 % (ref 37–54)
HCT VFR BLD AUTO: 26.7 % (ref 37–54)
HCT VFR BLD AUTO: 26.9 % (ref 37–54)
HCT VFR BLD AUTO: 26.9 % (ref 37–54)
HCT VFR BLD AUTO: 27.1 % (ref 37–54)
HCT VFR BLD AUTO: 27.3 % (ref 37–54)
HCT VFR BLD AUTO: 27.3 % (ref 37–54)
HCT VFR BLD AUTO: 27.7 % (ref 37–54)
HCT VFR BLD AUTO: 27.8 % (ref 37–54)
HCT VFR BLD AUTO: 27.8 % (ref 37–54)
HCT VFR BLD AUTO: 28.2 % (ref 37–54)
HCT VFR BLD AUTO: 28.7 % (ref 37–54)
HCT VFR BLD AUTO: 28.8 % (ref 37–54)
HCT VFR BLD AUTO: 29.5 % (ref 37–54)
HCT VFR BLD AUTO: 29.6 % (ref 37–54)
HCT VFR BLD AUTO: 29.8 % (ref 37–54)
HCT VFR BLD AUTO: 30.2 % (ref 37–54)
HCT VFR BLD AUTO: 31.2 % (ref 37–54)
HGB BLD-MCNC: 6.7 G/DL (ref 12.5–16.5)
HGB BLD-MCNC: 6.8 G/DL (ref 12.5–16.5)
HGB BLD-MCNC: 7 G/DL (ref 12.5–16.5)
HGB BLD-MCNC: 7 G/DL (ref 12.5–16.5)
HGB BLD-MCNC: 7.1 G/DL (ref 12.5–16.5)
HGB BLD-MCNC: 7.1 G/DL (ref 12.5–16.5)
HGB BLD-MCNC: 7.2 G/DL (ref 12.5–16.5)
HGB BLD-MCNC: 7.3 G/DL (ref 12.5–16.5)
HGB BLD-MCNC: 7.3 G/DL (ref 12.5–16.5)
HGB BLD-MCNC: 7.4 G/DL (ref 12.5–16.5)
HGB BLD-MCNC: 7.6 G/DL (ref 12.5–16.5)
HGB BLD-MCNC: 7.7 G/DL (ref 12.5–16.5)
HGB BLD-MCNC: 7.8 G/DL (ref 12.5–16.5)
HGB BLD-MCNC: 7.9 G/DL (ref 12.5–16.5)
HGB BLD-MCNC: 8 G/DL (ref 12.5–16.5)
HGB BLD-MCNC: 8.1 G/DL (ref 12.5–16.5)
HGB BLD-MCNC: 8.1 G/DL (ref 12.5–16.5)
HGB BLD-MCNC: 8.3 G/DL (ref 12.5–16.5)
HGB BLD-MCNC: 8.4 G/DL (ref 12.5–16.5)
HGB BLD-MCNC: 8.4 G/DL (ref 12.5–16.5)
HGB BLD-MCNC: 8.5 G/DL (ref 12.5–16.5)
HGB BLD-MCNC: 8.5 G/DL (ref 12.5–16.5)
HGB BLD-MCNC: 8.7 G/DL (ref 12.5–16.5)
HGB BLD-MCNC: 8.7 G/DL (ref 12.5–16.5)
HGB BLD-MCNC: 8.8 G/DL (ref 12.5–16.5)
HGB UR QL STRIP.AUTO: ABNORMAL
HGB UR QL STRIP.AUTO: ABNORMAL
HHB: 1 % (ref 0–5)
HHB: 1.1 % (ref 0–5)
HHB: 1.2 % (ref 0–5)
HHB: 1.2 % (ref 0–5)
HHB: 1.3 % (ref 0–5)
HHB: 1.3 % (ref 0–5)
HHB: 1.4 % (ref 0–5)
HHB: 1.4 % (ref 0–5)
HHB: 1.5 % (ref 0–5)
HHB: 1.6 % (ref 0–5)
HHB: 1.6 % (ref 0–5)
HHB: 1.8 % (ref 0–5)
HHB: 1.8 % (ref 0–5)
HHB: 1.9 % (ref 0–5)
HHB: 11.4 % (ref 0–5)
HHB: 14.1 % (ref 0–5)
HHB: 15.3 % (ref 0–5)
HHB: 2.2 % (ref 0–5)
HHB: 2.3 % (ref 0–5)
HHB: 2.3 % (ref 0–5)
HHB: 2.5 % (ref 0–5)
HHB: 2.6 % (ref 0–5)
HHB: 2.7 % (ref 0–5)
HHB: 2.8 % (ref 0–5)
HHB: 2.9 % (ref 0–5)
HHB: 29.9 % (ref 0–5)
HHB: 3.8 % (ref 0–5)
HHB: 4.1 % (ref 0–5)
HHB: 42.4 % (ref 0–5)
HHB: 44 % (ref 0–5)
HHB: 5.5 % (ref 0–5)
HHB: 6.1 % (ref 0–5)
HHB: 6.9 % (ref 0–5)
HHB: 68.3 % (ref 0–5)
HMPV RNA NPH QL NAA+NON-PROBE: NOT DETECTED
HPIV1 RNA NPH QL NAA+NON-PROBE: NOT DETECTED
HPIV2 RNA NPH QL NAA+NON-PROBE: NOT DETECTED
HPIV3 RNA NPH QL NAA+NON-PROBE: NOT DETECTED
HPIV4 RNA NPH QL NAA+NON-PROBE: NOT DETECTED
IMM GRANULOCYTES # BLD AUTO: 0.04 K/UL (ref 0–0.58)
IMM GRANULOCYTES # BLD AUTO: 0.05 K/UL (ref 0–0.58)
IMM GRANULOCYTES # BLD AUTO: 0.06 K/UL (ref 0–0.58)
IMM GRANULOCYTES # BLD AUTO: 0.09 K/UL (ref 0–0.58)
IMM GRANULOCYTES # BLD AUTO: 0.09 K/UL (ref 0–0.58)
IMM GRANULOCYTES # BLD AUTO: 0.1 K/UL (ref 0–0.58)
IMM GRANULOCYTES # BLD AUTO: 0.12 K/UL (ref 0–0.58)
IMM GRANULOCYTES # BLD AUTO: 0.13 K/UL (ref 0–0.58)
IMM GRANULOCYTES NFR BLD: 0 % (ref 0–5)
IMM GRANULOCYTES NFR BLD: 0 % (ref 0–5)
IMM GRANULOCYTES NFR BLD: 1 % (ref 0–5)
INR PPP: 1.4
INR PPP: 1.5
INR PPP: 1.6
INR PPP: 1.6
INR PPP: 1.7
INR PPP: 1.7
INR PPP: 1.8
INR PPP: 2
INR PPP: 2
INR PPP: 2.1
INR PPP: 2.3
INR PPP: 2.4
INR PPP: 2.5
INR PPP: 2.5
INR PPP: 2.8
INR PPP: 2.9
INR PPP: 2.9
INR PPP: 3.2
INR PPP: 3.2
INR PPP: 3.3
INR PPP: 4.9
INR PPP: 6.8
INR PPP: >10
IRON SATN MFR SERPL: 10 % (ref 20–55)
IRON SERPL-MCNC: 25 UG/DL (ref 61–157)
KETONES UR STRIP-MCNC: ABNORMAL MG/DL
KETONES UR STRIP-MCNC: NEGATIVE MG/DL
KINETICS TEG: 0.8 MIN (ref 1–3)
KINETICS TEG: 1.2 MIN (ref 1–3)
L PNEUMO1 AG UR QL IA.RAPID: NEGATIVE
L PNEUMO1 AG UR QL IA.RAPID: NEGATIVE
LAB: ABNORMAL
LABORATORY REPORT: NORMAL
LACTATE BLDV-SCNC: 0.8 MMOL/L (ref 0.5–2.2)
LACTATE BLDV-SCNC: 1 MMOL/L (ref 0.5–2.2)
LACTATE BLDV-SCNC: 1.9 MMOL/L (ref 0.5–2.2)
LEUKOCYTE ESTERASE UR QL STRIP: ABNORMAL
LEUKOCYTE ESTERASE UR QL STRIP: NEGATIVE
LY30 (LYSIS) TEG: 0 % (ref 0–8)
LY30 (LYSIS) TEG: 0.1 % (ref 0–8)
LYMPHOCYTES NFR BLD: 0.06 K/UL (ref 1.5–4)
LYMPHOCYTES NFR BLD: 0.3 K/UL (ref 1.5–4)
LYMPHOCYTES NFR BLD: 0.4 K/UL (ref 1.5–4)
LYMPHOCYTES NFR BLD: 0.41 K/UL (ref 1.5–4)
LYMPHOCYTES NFR BLD: 0.43 K/UL (ref 1.5–4)
LYMPHOCYTES NFR BLD: 0.48 K/UL (ref 1.5–4)
LYMPHOCYTES NFR BLD: 0.56 K/UL (ref 1.5–4)
LYMPHOCYTES NFR BLD: 0.57 K/UL (ref 1.5–4)
LYMPHOCYTES NFR BLD: 0.68 K/UL (ref 1.5–4)
LYMPHOCYTES NFR BLD: 0.68 K/UL (ref 1.5–4)
LYMPHOCYTES NFR BLD: 0.78 K/UL (ref 1.5–4)
LYMPHOCYTES NFR BLD: 0.79 K/UL (ref 1.5–4)
LYMPHOCYTES NFR BLD: 0.81 K/UL (ref 1.5–4)
LYMPHOCYTES NFR BLD: 0.84 K/UL (ref 1.5–4)
LYMPHOCYTES NFR BLD: 0.85 K/UL (ref 1.5–4)
LYMPHOCYTES NFR BLD: 0.87 K/UL (ref 1.5–4)
LYMPHOCYTES NFR BLD: 0.92 K/UL (ref 1.5–4)
LYMPHOCYTES NFR BLD: 0.92 K/UL (ref 1.5–4)
LYMPHOCYTES NFR BLD: 0.99 K/UL (ref 1.5–4)
LYMPHOCYTES NFR BLD: 1.03 K/UL (ref 1.5–4)
LYMPHOCYTES NFR BLD: 1.09 K/UL (ref 1.5–4)
LYMPHOCYTES NFR BLD: 1.1 K/UL (ref 1.5–4)
LYMPHOCYTES NFR BLD: 1.2 K/UL (ref 1.5–4)
LYMPHOCYTES NFR BLD: 1.68 K/UL (ref 1.5–4)
LYMPHOCYTES RELATIVE PERCENT: 1 % (ref 20–42)
LYMPHOCYTES RELATIVE PERCENT: 10 % (ref 20–42)
LYMPHOCYTES RELATIVE PERCENT: 11 % (ref 20–42)
LYMPHOCYTES RELATIVE PERCENT: 12 % (ref 20–42)
LYMPHOCYTES RELATIVE PERCENT: 4 % (ref 20–42)
LYMPHOCYTES RELATIVE PERCENT: 5 % (ref 20–42)
LYMPHOCYTES RELATIVE PERCENT: 5 % (ref 20–42)
LYMPHOCYTES RELATIVE PERCENT: 6 % (ref 20–42)
LYMPHOCYTES RELATIVE PERCENT: 7 % (ref 20–42)
LYMPHOCYTES RELATIVE PERCENT: 7 % (ref 20–42)
LYMPHOCYTES RELATIVE PERCENT: 8 % (ref 20–42)
LYMPHOCYTES RELATIVE PERCENT: 8 % (ref 20–42)
LYMPHOCYTES RELATIVE PERCENT: 9 % (ref 20–42)
Lab: 1200
Lab: 1208
Lab: 1320
Lab: 1445
Lab: 1555
Lab: 1600
Lab: 1610
Lab: 1714
Lab: 1834
Lab: 1913
Lab: 1934
Lab: 2007
Lab: 2050
Lab: 222
Lab: 2226
Lab: 2320
Lab: 2350
Lab: 355
Lab: 415
Lab: 415
Lab: 416
Lab: 417
Lab: 419
Lab: 420
Lab: 427
Lab: 435
Lab: 445
Lab: 447
Lab: 452
Lab: 540
Lab: 557
Lab: 600
Lab: 852
Lab: 920
M PNEUMO DNA NPH QL NAA+NON-PROBE: NOT DETECTED
MA (MAX CLOT) TEG: 71.8 MM (ref 50–70)
MA (MAX CLOT) TEG: 73.5 MM (ref 50–70)
MAGNESIUM SERPL-MCNC: 1.7 MG/DL (ref 1.6–2.6)
MAGNESIUM SERPL-MCNC: 1.8 MG/DL (ref 1.6–2.6)
MAGNESIUM SERPL-MCNC: 1.8 MG/DL (ref 1.6–2.6)
MAGNESIUM SERPL-MCNC: 1.9 MG/DL (ref 1.6–2.6)
MAGNESIUM SERPL-MCNC: 2 MG/DL (ref 1.6–2.6)
MAGNESIUM SERPL-MCNC: 2.1 MG/DL (ref 1.6–2.6)
MAGNESIUM SERPL-MCNC: 2.2 MG/DL (ref 1.6–2.6)
MAGNESIUM SERPL-MCNC: 2.3 MG/DL (ref 1.6–2.6)
MAGNESIUM SERPL-MCNC: 2.4 MG/DL (ref 1.6–2.6)
MAGNESIUM SERPL-MCNC: 2.4 MG/DL (ref 1.6–2.6)
MCH RBC QN AUTO: 25.2 PG (ref 26–35)
MCH RBC QN AUTO: 25.4 PG (ref 26–35)
MCH RBC QN AUTO: 25.5 PG (ref 26–35)
MCH RBC QN AUTO: 25.6 PG (ref 26–35)
MCH RBC QN AUTO: 25.6 PG (ref 26–35)
MCH RBC QN AUTO: 25.7 PG (ref 26–35)
MCH RBC QN AUTO: 25.8 PG (ref 26–35)
MCH RBC QN AUTO: 25.9 PG (ref 26–35)
MCH RBC QN AUTO: 26.2 PG (ref 26–35)
MCH RBC QN AUTO: 26.3 PG (ref 26–35)
MCH RBC QN AUTO: 26.4 PG (ref 26–35)
MCH RBC QN AUTO: 26.5 PG (ref 26–35)
MCH RBC QN AUTO: 26.5 PG (ref 26–35)
MCH RBC QN AUTO: 26.6 PG (ref 26–35)
MCH RBC QN AUTO: 26.8 PG (ref 26–35)
MCH RBC QN AUTO: 26.8 PG (ref 26–35)
MCH RBC QN AUTO: 27.1 PG (ref 26–35)
MCH RBC QN AUTO: 27.2 PG (ref 26–35)
MCH RBC QN AUTO: 27.2 PG (ref 26–35)
MCH RBC QN AUTO: 27.6 PG (ref 26–35)
MCH RBC QN AUTO: 27.7 PG (ref 26–35)
MCHC RBC AUTO-ENTMCNC: 27.1 G/DL (ref 32–34.5)
MCHC RBC AUTO-ENTMCNC: 28.2 G/DL (ref 32–34.5)
MCHC RBC AUTO-ENTMCNC: 28.4 G/DL (ref 32–34.5)
MCHC RBC AUTO-ENTMCNC: 28.5 G/DL (ref 32–34.5)
MCHC RBC AUTO-ENTMCNC: 28.6 G/DL (ref 32–34.5)
MCHC RBC AUTO-ENTMCNC: 28.8 G/DL (ref 32–34.5)
MCHC RBC AUTO-ENTMCNC: 28.9 G/DL (ref 32–34.5)
MCHC RBC AUTO-ENTMCNC: 29 G/DL (ref 32–34.5)
MCHC RBC AUTO-ENTMCNC: 29 G/DL (ref 32–34.5)
MCHC RBC AUTO-ENTMCNC: 29.1 G/DL (ref 32–34.5)
MCHC RBC AUTO-ENTMCNC: 29.2 G/DL (ref 32–34.5)
MCHC RBC AUTO-ENTMCNC: 29.2 G/DL (ref 32–34.5)
MCHC RBC AUTO-ENTMCNC: 29.3 G/DL (ref 32–34.5)
MCHC RBC AUTO-ENTMCNC: 29.4 G/DL (ref 32–34.5)
MCHC RBC AUTO-ENTMCNC: 29.5 G/DL (ref 32–34.5)
MCHC RBC AUTO-ENTMCNC: 29.5 G/DL (ref 32–34.5)
MCHC RBC AUTO-ENTMCNC: 29.6 G/DL (ref 32–34.5)
MCHC RBC AUTO-ENTMCNC: 29.7 G/DL (ref 32–34.5)
MCHC RBC AUTO-ENTMCNC: 29.7 G/DL (ref 32–34.5)
MCHC RBC AUTO-ENTMCNC: 29.9 G/DL (ref 32–34.5)
MCHC RBC AUTO-ENTMCNC: 30.2 G/DL (ref 32–34.5)
MCV RBC AUTO: 101.9 FL (ref 80–99.9)
MCV RBC AUTO: 86.2 FL (ref 80–99.9)
MCV RBC AUTO: 86.2 FL (ref 80–99.9)
MCV RBC AUTO: 87.5 FL (ref 80–99.9)
MCV RBC AUTO: 87.9 FL (ref 80–99.9)
MCV RBC AUTO: 88 FL (ref 80–99.9)
MCV RBC AUTO: 88.1 FL (ref 80–99.9)
MCV RBC AUTO: 88.6 FL (ref 80–99.9)
MCV RBC AUTO: 88.7 FL (ref 80–99.9)
MCV RBC AUTO: 88.7 FL (ref 80–99.9)
MCV RBC AUTO: 88.9 FL (ref 80–99.9)
MCV RBC AUTO: 89.1 FL (ref 80–99.9)
MCV RBC AUTO: 89.4 FL (ref 80–99.9)
MCV RBC AUTO: 89.5 FL (ref 80–99.9)
MCV RBC AUTO: 89.5 FL (ref 80–99.9)
MCV RBC AUTO: 89.6 FL (ref 80–99.9)
MCV RBC AUTO: 90 FL (ref 80–99.9)
MCV RBC AUTO: 90.1 FL (ref 80–99.9)
MCV RBC AUTO: 90.4 FL (ref 80–99.9)
MCV RBC AUTO: 90.5 FL (ref 80–99.9)
MCV RBC AUTO: 90.7 FL (ref 80–99.9)
MCV RBC AUTO: 90.9 FL (ref 80–99.9)
MCV RBC AUTO: 90.9 FL (ref 80–99.9)
MCV RBC AUTO: 91.7 FL (ref 80–99.9)
MCV RBC AUTO: 93.1 FL (ref 80–99.9)
MCV RBC AUTO: 94.2 FL (ref 80–99.9)
MCV RBC AUTO: 94.2 FL (ref 80–99.9)
MCV RBC AUTO: 95.1 FL (ref 80–99.9)
MCV RBC AUTO: 95.2 FL (ref 80–99.9)
MCV RBC AUTO: 95.8 FL (ref 80–99.9)
METHADONE UR QL: NEGATIVE
METHADONE UR QL: NEGATIVE
METHB: 0.4 % (ref 0–1.5)
METHB: 0.5 % (ref 0–1.5)
METHB: 0.6 % (ref 0–1.5)
METHB: 0.7 % (ref 0–1.5)
METHB: 0.8 % (ref 0–1.5)
METHB: 0.9 % (ref 0–1.5)
MICROORGANISM SPEC CULT: ABNORMAL
MICROORGANISM SPEC CULT: NO GROWTH
MICROORGANISM SPEC CULT: NORMAL
MICROORGANISM/AGENT SPEC: ABNORMAL
MICROORGANISM/AGENT SPEC: NORMAL
MODE: ABNORMAL
MODE: AC
MONOCYTES NFR BLD: 0.17 K/UL (ref 0.1–0.95)
MONOCYTES NFR BLD: 0.18 K/UL (ref 0.1–0.95)
MONOCYTES NFR BLD: 0.25 K/UL (ref 0.1–0.95)
MONOCYTES NFR BLD: 0.39 K/UL (ref 0.1–0.95)
MONOCYTES NFR BLD: 0.42 K/UL (ref 0.1–0.95)
MONOCYTES NFR BLD: 0.46 K/UL (ref 0.1–0.95)
MONOCYTES NFR BLD: 0.57 K/UL (ref 0.1–0.95)
MONOCYTES NFR BLD: 0.64 K/UL (ref 0.1–0.95)
MONOCYTES NFR BLD: 0.74 K/UL (ref 0.1–0.95)
MONOCYTES NFR BLD: 0.84 K/UL (ref 0.1–0.95)
MONOCYTES NFR BLD: 0.89 K/UL (ref 0.1–0.95)
MONOCYTES NFR BLD: 1.02 K/UL (ref 0.1–0.95)
MONOCYTES NFR BLD: 1.09 K/UL (ref 0.1–0.95)
MONOCYTES NFR BLD: 1.19 K/UL (ref 0.1–0.95)
MONOCYTES NFR BLD: 1.25 K/UL (ref 0.1–0.95)
MONOCYTES NFR BLD: 1.32 K/UL (ref 0.1–0.95)
MONOCYTES NFR BLD: 1.4 K/UL (ref 0.1–0.95)
MONOCYTES NFR BLD: 1.41 K/UL (ref 0.1–0.95)
MONOCYTES NFR BLD: 1.68 K/UL (ref 0.1–0.95)
MONOCYTES NFR BLD: 1.71 K/UL (ref 0.1–0.95)
MONOCYTES NFR BLD: 1.82 K/UL (ref 0.1–0.95)
MONOCYTES NFR BLD: 1.83 K/UL (ref 0.1–0.95)
MONOCYTES NFR BLD: 1.89 K/UL (ref 0.1–0.95)
MONOCYTES NFR BLD: 10 % (ref 2–12)
MONOCYTES NFR BLD: 11 % (ref 2–12)
MONOCYTES NFR BLD: 11 % (ref 2–12)
MONOCYTES NFR BLD: 13 % (ref 2–12)
MONOCYTES NFR BLD: 14 % (ref 2–12)
MONOCYTES NFR BLD: 17 % (ref 2–12)
MONOCYTES NFR BLD: 2 % (ref 2–12)
MONOCYTES NFR BLD: 2.1 K/UL (ref 0.1–0.95)
MONOCYTES NFR BLD: 3 % (ref 2–12)
MONOCYTES NFR BLD: 4 % (ref 2–12)
MONOCYTES NFR BLD: 6 % (ref 2–12)
MONOCYTES NFR BLD: 6 % (ref 2–12)
MONOCYTES NFR BLD: 7 % (ref 2–12)
MONOCYTES NFR BLD: 7 % (ref 2–12)
MONOCYTES NFR BLD: 8 % (ref 2–12)
MONOCYTES NFR BLD: 9 % (ref 2–12)
MYCOPLASMA AB,IGM: NORMAL
MYELOCYTES ABSOLUTE COUNT: 0.24 K/UL
MYELOCYTES ABSOLUTE COUNT: 0.28 K/UL
MYELOCYTES: 2 %
MYELOCYTES: 2 %
NEUTROPHILS NFR BLD: 64 % (ref 43–80)
NEUTROPHILS NFR BLD: 69 % (ref 43–80)
NEUTROPHILS NFR BLD: 71 % (ref 43–80)
NEUTROPHILS NFR BLD: 71 % (ref 43–80)
NEUTROPHILS NFR BLD: 74 % (ref 43–80)
NEUTROPHILS NFR BLD: 75 % (ref 43–80)
NEUTROPHILS NFR BLD: 76 % (ref 43–80)
NEUTROPHILS NFR BLD: 77 % (ref 43–80)
NEUTROPHILS NFR BLD: 78 % (ref 43–80)
NEUTROPHILS NFR BLD: 79 % (ref 43–80)
NEUTROPHILS NFR BLD: 79 % (ref 43–80)
NEUTROPHILS NFR BLD: 80 % (ref 43–80)
NEUTROPHILS NFR BLD: 83 % (ref 43–80)
NEUTROPHILS NFR BLD: 84 % (ref 43–80)
NEUTROPHILS NFR BLD: 84 % (ref 43–80)
NEUTROPHILS NFR BLD: 85 % (ref 43–80)
NEUTROPHILS NFR BLD: 86 % (ref 43–80)
NEUTROPHILS NFR BLD: 87 % (ref 43–80)
NEUTROPHILS NFR BLD: 89 % (ref 43–80)
NEUTROPHILS NFR BLD: 89 % (ref 43–80)
NEUTROPHILS NFR BLD: 90 % (ref 43–80)
NEUTROPHILS NFR BLD: 90 % (ref 43–80)
NEUTROPHILS NFR BLD: 94 % (ref 43–80)
NEUTROPHILS NFR BLD: 97 % (ref 43–80)
NEUTS SEG NFR BLD: 10.12 K/UL (ref 1.8–7.3)
NEUTS SEG NFR BLD: 10.16 K/UL (ref 1.8–7.3)
NEUTS SEG NFR BLD: 10.92 K/UL (ref 1.8–7.3)
NEUTS SEG NFR BLD: 11.29 K/UL (ref 1.8–7.3)
NEUTS SEG NFR BLD: 11.64 K/UL (ref 1.8–7.3)
NEUTS SEG NFR BLD: 11.69 K/UL (ref 1.8–7.3)
NEUTS SEG NFR BLD: 11.83 K/UL (ref 1.8–7.3)
NEUTS SEG NFR BLD: 12.15 K/UL (ref 1.8–7.3)
NEUTS SEG NFR BLD: 12.29 K/UL (ref 1.8–7.3)
NEUTS SEG NFR BLD: 13.44 K/UL (ref 1.8–7.3)
NEUTS SEG NFR BLD: 5.25 K/UL (ref 1.8–7.3)
NEUTS SEG NFR BLD: 5.66 K/UL (ref 1.8–7.3)
NEUTS SEG NFR BLD: 5.85 K/UL (ref 1.8–7.3)
NEUTS SEG NFR BLD: 6.56 K/UL (ref 1.8–7.3)
NEUTS SEG NFR BLD: 6.57 K/UL (ref 1.8–7.3)
NEUTS SEG NFR BLD: 7.04 K/UL (ref 1.8–7.3)
NEUTS SEG NFR BLD: 7.37 K/UL (ref 1.8–7.3)
NEUTS SEG NFR BLD: 8.3 K/UL (ref 1.8–7.3)
NEUTS SEG NFR BLD: 8.42 K/UL (ref 1.8–7.3)
NEUTS SEG NFR BLD: 8.84 K/UL (ref 1.8–7.3)
NEUTS SEG NFR BLD: 8.92 K/UL (ref 1.8–7.3)
NEUTS SEG NFR BLD: 9.66 K/UL (ref 1.8–7.3)
NEUTS SEG NFR BLD: 9.85 K/UL (ref 1.8–7.3)
NEUTS SEG NFR BLD: 9.9 K/UL (ref 1.8–7.3)
NITRITE UR QL STRIP: NEGATIVE
NITRITE UR QL STRIP: NEGATIVE
NON-GYN CYTOLOGY REPORT: NORMAL
NUCLEATED RED BLOOD CELLS: 1 PER 100 WBC
O2 DELIVERY DEVICE: ABNORMAL
O2 DELIVERY DEVICE: ABNORMAL
O2 SAT, MIXED: 71.4 %
O2 SATURATION: 30.3 % (ref 92–98.5)
O2 SATURATION: 55 % (ref 92–98.5)
O2 SATURATION: 56.6 % (ref 92–98.5)
O2 SATURATION: 69.6 % (ref 92–98.5)
O2 SATURATION: 84.5 % (ref 92–98.5)
O2 SATURATION: 85.7 % (ref 92–98.5)
O2 SATURATION: 88.4 % (ref 92–98.5)
O2 SATURATION: 93 % (ref 92–98.5)
O2 SATURATION: 93.8 % (ref 92–98.5)
O2 SATURATION: 94.4 % (ref 92–98.5)
O2 SATURATION: 95.8 % (ref 92–98.5)
O2 SATURATION: 96.1 % (ref 92–98.5)
O2 SATURATION: 97 % (ref 92–98.5)
O2 SATURATION: 97.1 % (ref 92–98.5)
O2 SATURATION: 97.2 % (ref 92–98.5)
O2 SATURATION: 97.3 % (ref 92–98.5)
O2 SATURATION: 97.4 % (ref 92–98.5)
O2 SATURATION: 97.7 % (ref 92–98.5)
O2 SATURATION: 97.7 % (ref 92–98.5)
O2 SATURATION: 97.8 % (ref 92–98.5)
O2 SATURATION: 98.1 % (ref 92–98.5)
O2 SATURATION: 98.2 % (ref 92–98.5)
O2 SATURATION: 98.2 % (ref 92–98.5)
O2 SATURATION: 98.4 % (ref 92–98.5)
O2 SATURATION: 98.4 % (ref 92–98.5)
O2 SATURATION: 98.5 % (ref 92–98.5)
O2 SATURATION: 98.6 % (ref 92–98.5)
O2 SATURATION: 98.6 % (ref 92–98.5)
O2 SATURATION: 98.7 % (ref 92–98.5)
O2 SATURATION: 98.7 % (ref 92–98.5)
O2 SATURATION: 98.8 % (ref 92–98.5)
O2 SATURATION: 98.8 % (ref 92–98.5)
O2 SATURATION: 98.9 % (ref 92–98.5)
O2 SATURATION: 99 % (ref 92–98.5)
O2HB: 29.7 % (ref 94–97)
O2HB: 53.8 % (ref 94–97)
O2HB: 55.2 % (ref 94–97)
O2HB: 68.4 % (ref 94–97)
O2HB: 83.1 % (ref 94–97)
O2HB: 84.2 % (ref 94–97)
O2HB: 86.9 % (ref 94–97)
O2HB: 91.1 % (ref 94–97)
O2HB: 91.6 % (ref 94–97)
O2HB: 92.6 % (ref 94–97)
O2HB: 94.5 % (ref 94–97)
O2HB: 94.5 % (ref 94–97)
O2HB: 94.8 % (ref 94–97)
O2HB: 94.8 % (ref 94–97)
O2HB: 95.2 % (ref 94–97)
O2HB: 95.2 % (ref 94–97)
O2HB: 95.5 % (ref 94–97)
O2HB: 95.6 % (ref 94–97)
O2HB: 95.7 % (ref 94–97)
O2HB: 95.9 % (ref 94–97)
O2HB: 96.3 % (ref 94–97)
O2HB: 96.4 % (ref 94–97)
O2HB: 96.4 % (ref 94–97)
O2HB: 96.5 % (ref 94–97)
O2HB: 96.5 % (ref 94–97)
O2HB: 96.6 % (ref 94–97)
O2HB: 96.9 % (ref 94–97)
O2HB: 96.9 % (ref 94–97)
O2HB: 97 % (ref 94–97)
OPERATOR ID: 1115
OPERATOR ID: 1115
OPERATOR ID: 1741
OPERATOR ID: 2220
OPERATOR ID: 2593
OPERATOR ID: 2860
OPERATOR ID: 2860
OPERATOR ID: 2962
OPERATOR ID: 2962
OPERATOR ID: 3214
OPERATOR ID: 5134
OPERATOR ID: 7291
OPERATOR ID: 7292
OPERATOR ID: 7292
OPERATOR ID: 7490
OPERATOR ID: ABNORMAL
OPIATES UR QL SCN: NEGATIVE
OPIATES UR QL SCN: NEGATIVE
OSMOLALITY SERPL: 317 MOSM/KG (ref 285–310)
OSMOLALITY SERPL: 319 MOSM/KG (ref 285–310)
OXYCODONE UR QL SCN: NEGATIVE
OXYCODONE UR QL SCN: NEGATIVE
PARTIAL THROMBOPLASTIN TIME: 30.3 SEC (ref 24.5–35.1)
PARTIAL THROMBOPLASTIN TIME: 30.6 SEC (ref 24.5–35.1)
PARTIAL THROMBOPLASTIN TIME: 33.6 SEC (ref 24.5–35.1)
PARTIAL THROMBOPLASTIN TIME: 37.9 SEC (ref 24.5–35.1)
PARTIAL THROMBOPLASTIN TIME: 38.1 SEC (ref 24.5–35.1)
PARTIAL THROMBOPLASTIN TIME: 40 SEC (ref 24.5–35.1)
PARTIAL THROMBOPLASTIN TIME: 41.5 SEC (ref 24.5–35.1)
PARTIAL THROMBOPLASTIN TIME: 42.1 SEC (ref 24.5–35.1)
PARTIAL THROMBOPLASTIN TIME: 43.3 SEC (ref 24.5–35.1)
PARTIAL THROMBOPLASTIN TIME: 48.1 SEC (ref 24.5–35.1)
PARTIAL THROMBOPLASTIN TIME: 52.4 SEC (ref 24.5–35.1)
PARTIAL THROMBOPLASTIN TIME: 52.6 SEC (ref 24.5–35.1)
PARTIAL THROMBOPLASTIN TIME: 55.5 SEC (ref 24.5–35.1)
PARTIAL THROMBOPLASTIN TIME: 57.7 SEC (ref 24.5–35.1)
PARTIAL THROMBOPLASTIN TIME: 62.6 SEC (ref 24.5–35.1)
PARTIAL THROMBOPLASTIN TIME: 66.6 SEC (ref 24.5–35.1)
PARTIAL THROMBOPLASTIN TIME: 68.7 SEC (ref 24.5–35.1)
PARTIAL THROMBOPLASTIN TIME: 72 SEC (ref 24.5–35.1)
PARTIAL THROMBOPLASTIN TIME: 76.3 SEC (ref 24.5–35.1)
PARTIAL THROMBOPLASTIN TIME: 81.4 SEC (ref 24.5–35.1)
PARTIAL THROMBOPLASTIN TIME: NORMAL SEC (ref 20.5–30.5)
PATIENT TEMP: 37
PATIENT TEMP: 37 C
PCO2 MIXED: 42.5 MM HG
PCO2: 31 MMHG (ref 35–45)
PCO2: 33 MMHG (ref 35–45)
PCO2: 33.3 MMHG (ref 35–45)
PCO2: 33.7 MMHG (ref 35–45)
PCO2: 34.1 MMHG (ref 35–45)
PCO2: 34.1 MMHG (ref 35–45)
PCO2: 34.3 MMHG (ref 35–45)
PCO2: 34.3 MMHG (ref 35–45)
PCO2: 36.2 MMHG (ref 35–45)
PCO2: 36.4 MMHG (ref 35–45)
PCO2: 36.9 MMHG (ref 35–45)
PCO2: 37.6 MMHG (ref 35–45)
PCO2: 37.7 MMHG (ref 35–45)
PCO2: 38 MMHG (ref 35–45)
PCO2: 38.6 MMHG (ref 35–45)
PCO2: 39.1 MMHG (ref 35–45)
PCO2: 40.3 MMHG (ref 35–45)
PCO2: 41.5 MMHG (ref 35–45)
PCO2: 43.2 MMHG (ref 35–45)
PCO2: 43.8 MMHG (ref 35–45)
PCO2: 43.9 MMHG (ref 35–45)
PCO2: 44 MMHG (ref 35–45)
PCO2: 44.2 MMHG (ref 35–45)
PCO2: 44.2 MMHG (ref 35–45)
PCO2: 45 MMHG (ref 35–45)
PCO2: 45.1 MMHG (ref 35–45)
PCO2: 45.3 MMHG (ref 35–45)
PCO2: 45.4 MMHG (ref 35–45)
PCO2: 45.9 MMHG (ref 35–45)
PCO2: 46.6 MMHG (ref 35–45)
PCO2: 48.4 MMHG (ref 35–45)
PCO2: 53.6 MMHG (ref 35–45)
PCO2: 60.6 MMHG (ref 35–45)
PCO2: 61.2 MMHG (ref 35–45)
PCP UR QL SCN: NEGATIVE
PCP UR QL SCN: NEGATIVE
PEEP/CPAP: 5 CMH2O
PEEP/CPAP: 8 CMH2O
PEEP/CPAP: ABNORMAL CMH2O
PEEP: 5
PETH INTERPRETATION: NORMAL
PFO2: 0.85 MMHG/%
PFO2: 1.18 MMHG/%
PFO2: 1.4 MMHG/%
PFO2: 2.38 MMHG/%
PFO2: 2.43 MMHG/%
PFO2: 2.45 MMHG/%
PFO2: 2.46 MMHG/%
PFO2: 2.46 MMHG/%
PFO2: 2.61 MMHG/%
PFO2: 2.71 MMHG/%
PFO2: 2.74 MMHG/%
PFO2: 2.83 MMHG/%
PFO2: 2.87 MMHG/%
PFO2: 2.95 MMHG/%
PFO2: 3.09 MMHG/%
PFO2: 3.15 MMHG/%
PFO2: 3.27 MMHG/%
PFO2: 3.3 MMHG/%
PFO2: 3.4 MMHG/%
PFO2: 3.62 MMHG/%
PFO2: 3.63 MMHG/%
PFO2: 3.65 MMHG/%
PH BLOOD GAS: 7.19 (ref 7.35–7.45)
PH BLOOD GAS: 7.23 (ref 7.35–7.45)
PH BLOOD GAS: 7.24 (ref 7.35–7.45)
PH BLOOD GAS: 7.25 (ref 7.35–7.45)
PH BLOOD GAS: 7.25 (ref 7.35–7.45)
PH BLOOD GAS: 7.27 (ref 7.35–7.45)
PH BLOOD GAS: 7.29 (ref 7.35–7.45)
PH BLOOD GAS: 7.3 (ref 7.35–7.45)
PH BLOOD GAS: 7.32 (ref 7.35–7.45)
PH BLOOD GAS: 7.32 (ref 7.35–7.45)
PH BLOOD GAS: 7.33 (ref 7.35–7.45)
PH BLOOD GAS: 7.35 (ref 7.35–7.45)
PH BLOOD GAS: 7.36 (ref 7.35–7.45)
PH BLOOD GAS: 7.4 (ref 7.35–7.45)
PH BLOOD GAS: 7.42 (ref 7.35–7.45)
PH BLOOD GAS: 7.43 (ref 7.35–7.45)
PH BLOOD GAS: 7.43 (ref 7.35–7.45)
PH BLOOD GAS: 7.44 (ref 7.35–7.45)
PH BLOOD GAS: 7.44 (ref 7.35–7.45)
PH BLOOD GAS: 7.45 (ref 7.35–7.45)
PH BLOOD GAS: 7.46 (ref 7.35–7.45)
PH BLOOD GAS: 7.47 (ref 7.35–7.45)
PH BLOOD GAS: 7.48 (ref 7.35–7.45)
PH UR STRIP: 5.5 [PH] (ref 5–8)
PH UR STRIP: 6 [PH] (ref 5–8)
PH, MIXED: 7.42 (ref 7.35–7.45)
PHOSPHATE SERPL-MCNC: 2.5 MG/DL (ref 2.5–4.5)
PHOSPHATE SERPL-MCNC: 2.6 MG/DL (ref 2.5–4.5)
PHOSPHATE SERPL-MCNC: 3 MG/DL (ref 2.5–4.5)
PHOSPHATE SERPL-MCNC: 3 MG/DL (ref 2.5–4.5)
PHOSPHATE SERPL-MCNC: 3.4 MG/DL (ref 2.5–4.5)
PHOSPHATE SERPL-MCNC: 3.8 MG/DL (ref 2.5–4.5)
PHOSPHATE SERPL-MCNC: 3.9 MG/DL (ref 2.5–4.5)
PHOSPHATE SERPL-MCNC: 3.9 MG/DL (ref 2.5–4.5)
PHOSPHATE SERPL-MCNC: 4 MG/DL (ref 2.5–4.5)
PHOSPHATE SERPL-MCNC: 4 MG/DL (ref 2.5–4.5)
PHOSPHATE SERPL-MCNC: 4.1 MG/DL (ref 2.5–4.5)
PHOSPHATE SERPL-MCNC: 4.1 MG/DL (ref 2.5–4.5)
PHOSPHATE SERPL-MCNC: 4.2 MG/DL (ref 2.5–4.5)
PHOSPHATE SERPL-MCNC: 4.2 MG/DL (ref 2.5–4.5)
PHOSPHATE SERPL-MCNC: 4.3 MG/DL (ref 2.5–4.5)
PHOSPHATE SERPL-MCNC: 4.5 MG/DL (ref 2.5–4.5)
PHOSPHATE SERPL-MCNC: 5.2 MG/DL (ref 2.5–4.5)
PIP: 15 CMH2O
PIP: 15 CMH2O
PLATELET # BLD AUTO: 112 K/UL (ref 130–450)
PLATELET # BLD AUTO: 126 K/UL (ref 130–450)
PLATELET # BLD AUTO: 126 K/UL (ref 130–450)
PLATELET # BLD AUTO: 132 K/UL (ref 130–450)
PLATELET # BLD AUTO: 135 K/UL (ref 130–450)
PLATELET # BLD AUTO: 144 K/UL (ref 130–450)
PLATELET # BLD AUTO: 151 K/UL (ref 130–450)
PLATELET # BLD AUTO: 161 K/UL (ref 130–450)
PLATELET # BLD AUTO: 88 K/UL (ref 130–450)
PLATELET CONFIRMATION: NORMAL
PLATELET, FLUORESCENCE: 100 K/UL (ref 130–450)
PLATELET, FLUORESCENCE: 100 K/UL (ref 130–450)
PLATELET, FLUORESCENCE: 103 K/UL (ref 130–450)
PLATELET, FLUORESCENCE: 103 K/UL (ref 130–450)
PLATELET, FLUORESCENCE: 106 K/UL (ref 130–450)
PLATELET, FLUORESCENCE: 106 K/UL (ref 130–450)
PLATELET, FLUORESCENCE: 108 K/UL (ref 130–450)
PLATELET, FLUORESCENCE: 109 K/UL (ref 130–450)
PLATELET, FLUORESCENCE: 110 K/UL (ref 130–450)
PLATELET, FLUORESCENCE: 111 K/UL (ref 130–450)
PLATELET, FLUORESCENCE: 112 K/UL (ref 130–450)
PLATELET, FLUORESCENCE: 115 K/UL (ref 130–450)
PLATELET, FLUORESCENCE: 116 K/UL (ref 130–450)
PLATELET, FLUORESCENCE: 119 K/UL (ref 130–450)
PLATELET, FLUORESCENCE: 123 K/UL (ref 130–450)
PLATELET, FLUORESCENCE: 123 K/UL (ref 130–450)
PLATELET, FLUORESCENCE: 131 K/UL (ref 130–450)
PLATELET, FLUORESCENCE: 138 K/UL (ref 130–450)
PLATELET, FLUORESCENCE: 88 K/UL (ref 130–450)
PLATELET, FLUORESCENCE: 90 K/UL (ref 130–450)
PLATELET, FLUORESCENCE: 96 K/UL (ref 130–450)
PLPETH BLD-MCNC: <10 NG/ML
PMV BLD AUTO: 11.5 FL (ref 7–12)
PMV BLD AUTO: 11.7 FL (ref 7–12)
PMV BLD AUTO: 11.9 FL (ref 7–12)
PMV BLD AUTO: 11.9 FL (ref 7–12)
PMV BLD AUTO: 12 FL (ref 7–12)
PMV BLD AUTO: 12 FL (ref 7–12)
PMV BLD AUTO: 12.1 FL (ref 7–12)
PMV BLD AUTO: 12.2 FL (ref 7–12)
PMV BLD AUTO: 12.4 FL (ref 7–12)
PMV BLD AUTO: 12.6 FL (ref 7–12)
PMV BLD AUTO: 12.9 FL (ref 7–12)
PMV BLD AUTO: ABNORMAL FL (ref 7–12)
PO2 MIXED: 36.9 MM HG
PO2: 104.2 MMHG (ref 75–100)
PO2: 108.3 MMHG (ref 75–100)
PO2: 109.2 MMHG (ref 75–100)
PO2: 109.5 MMHG (ref 75–100)
PO2: 112.2 MMHG (ref 75–100)
PO2: 114.6 MMHG (ref 75–100)
PO2: 114.8 MMHG (ref 75–100)
PO2: 118.2 MMHG (ref 75–100)
PO2: 126 MMHG (ref 75–100)
PO2: 127.6 MMHG (ref 75–100)
PO2: 130.9 MMHG (ref 75–100)
PO2: 131.9 MMHG (ref 75–100)
PO2: 135 MMHG (ref 75–100)
PO2: 136.1 MMHG (ref 75–100)
PO2: 137.1 MMHG (ref 75–100)
PO2: 144.7 MMHG (ref 75–100)
PO2: 145 MMHG (ref 75–100)
PO2: 32 MMHG (ref 75–100)
PO2: 33.4 MMHG (ref 75–100)
PO2: 38.2 MMHG (ref 75–100)
PO2: 53.3 MMHG (ref 75–100)
PO2: 57 MMHG (ref 75–100)
PO2: 59.1 MMHG (ref 75–100)
PO2: 69.9 MMHG (ref 75–100)
PO2: 70.6 MMHG (ref 75–100)
PO2: 74.9 MMHG (ref 75–100)
PO2: 83.4 MMHG (ref 75–100)
PO2: 86.2 MMHG (ref 75–100)
PO2: 94.7 MMHG (ref 75–100)
PO2: 97.9 MMHG (ref 75–100)
PO2: 98.5 MMHG (ref 75–100)
PO2: 98.6 MMHG (ref 75–100)
PO2: 99 MMHG (ref 75–100)
PO2: <20 MMHG (ref 75–100)
POC HCO3: 30.6 MMOL/L (ref 22–26)
POC HCO3: 34 MMOL/L (ref 22–26)
POC O2 SATURATION: 56 % (ref 92–98.5)
POC O2 SATURATION: 82.3 % (ref 92–98.5)
POC PCO2 TEMP: 62.6 MM HG
POC PCO2: 62.6 MM HG (ref 35–45)
POC PCO2: 71 MM HG (ref 35–45)
POC PH TEMP: 7.34
POC PH: 7.24 (ref 7.35–7.45)
POC PH: 7.34 (ref 7.35–7.45)
POC PO2 TEMP: 51.1 MM HG
POC PO2: 35.5 MM HG (ref 80–100)
POC PO2: 51.1 MM HG (ref 80–100)
POPETH BLD-MCNC: <10 NG/ML
POSITIVE BASE EXCESS, ART: 2.3 MMOL/L (ref 0–3)
POSITIVE BASE EXCESS, ART: 7 MMOL/L (ref 0–3)
POSITIVE BASE EXCESS, MIXED: 2.9 MMOL/L
POTASSIUM SERPL-SCNC: 3.2 MMOL/L (ref 3.5–5)
POTASSIUM SERPL-SCNC: 3.2 MMOL/L (ref 3.5–5.1)
POTASSIUM SERPL-SCNC: 3.3 MMOL/L (ref 3.5–5.1)
POTASSIUM SERPL-SCNC: 3.4 MMOL/L (ref 3.5–5)
POTASSIUM SERPL-SCNC: 3.5 MMOL/L (ref 3.5–5)
POTASSIUM SERPL-SCNC: 3.5 MMOL/L (ref 3.5–5)
POTASSIUM SERPL-SCNC: 3.5 MMOL/L (ref 3.5–5.1)
POTASSIUM SERPL-SCNC: 3.6 MMOL/L (ref 3.5–5)
POTASSIUM SERPL-SCNC: 3.6 MMOL/L (ref 3.5–5.1)
POTASSIUM SERPL-SCNC: 3.6 MMOL/L (ref 3.5–5.1)
POTASSIUM SERPL-SCNC: 3.7 MMOL/L (ref 3.5–5)
POTASSIUM SERPL-SCNC: 3.7 MMOL/L (ref 3.5–5.1)
POTASSIUM SERPL-SCNC: 3.8 MMOL/L (ref 3.5–5)
POTASSIUM SERPL-SCNC: 3.8 MMOL/L (ref 3.5–5)
POTASSIUM SERPL-SCNC: 3.8 MMOL/L (ref 3.5–5.1)
POTASSIUM SERPL-SCNC: 3.9 MMOL/L (ref 3.5–5.1)
POTASSIUM SERPL-SCNC: 4 MMOL/L (ref 3.5–5)
POTASSIUM SERPL-SCNC: 4 MMOL/L (ref 3.5–5)
POTASSIUM SERPL-SCNC: 4 MMOL/L (ref 3.5–5.1)
POTASSIUM SERPL-SCNC: 4.07 MMOL/L (ref 3.5–5)
POTASSIUM SERPL-SCNC: 4.1 MMOL/L (ref 3.5–5.1)
POTASSIUM SERPL-SCNC: 4.11 MMOL/L (ref 3.5–5)
POTASSIUM SERPL-SCNC: 4.2 MMOL/L (ref 3.5–5)
POTASSIUM SERPL-SCNC: 4.2 MMOL/L (ref 3.5–5.1)
POTASSIUM SERPL-SCNC: 4.3 MMOL/L (ref 3.5–5.1)
POTASSIUM SERPL-SCNC: 4.3 MMOL/L (ref 3.5–5.1)
POTASSIUM SERPL-SCNC: 4.4 MMOL/L (ref 3.5–5.1)
POTASSIUM SERPL-SCNC: 4.4 MMOL/L (ref 3.5–5.1)
POTASSIUM SERPL-SCNC: 4.5 MMOL/L (ref 3.5–5.1)
POTASSIUM SERPL-SCNC: 4.7 MMOL/L (ref 3.5–5.1)
POTASSIUM SERPL-SCNC: 4.8 MMOL/L (ref 3.5–5.1)
POTASSIUM SERPL-SCNC: 5.2 MMOL/L (ref 3.5–5.1)
PROCALCITONIN SERPL-MCNC: 0.19 NG/ML (ref 0–0.08)
PROCALCITONIN SERPL-MCNC: 0.21 NG/ML (ref 0–0.08)
PROCALCITONIN SERPL-MCNC: 0.28 NG/ML (ref 0–0.08)
PROCALCITONIN SERPL-MCNC: 0.35 NG/ML (ref 0–0.08)
PROT SERPL-MCNC: 5.8 G/DL (ref 6.4–8.3)
PROT SERPL-MCNC: 5.8 G/DL (ref 6.4–8.3)
PROT SERPL-MCNC: 6.2 G/DL (ref 6.4–8.3)
PROT SERPL-MCNC: 6.2 G/DL (ref 6.4–8.3)
PROT SERPL-MCNC: 6.3 G/DL (ref 6.4–8.3)
PROT SERPL-MCNC: 6.4 G/DL (ref 6.4–8.3)
PROT SERPL-MCNC: 6.5 G/DL (ref 6.4–8.3)
PROT SERPL-MCNC: 6.6 G/DL (ref 6.4–8.3)
PROT SERPL-MCNC: 6.7 G/DL (ref 6.4–8.3)
PROT SERPL-MCNC: 6.8 G/DL (ref 6.4–8.3)
PROT SERPL-MCNC: 6.9 G/DL (ref 6.4–8.3)
PROT SERPL-MCNC: 7 G/DL (ref 6.4–8.3)
PROT SERPL-MCNC: 7 G/DL (ref 6.4–8.3)
PROT SERPL-MCNC: 7.1 G/DL (ref 6.4–8.3)
PROT UR STRIP-MCNC: 30 MG/DL
PROT UR STRIP-MCNC: 30 MG/DL
PROTHROMBIN TIME: 15.3 SEC (ref 9.3–12.4)
PROTHROMBIN TIME: 15.5 SEC (ref 9.3–12.4)
PROTHROMBIN TIME: 15.6 SEC (ref 9.3–12.4)
PROTHROMBIN TIME: 15.9 SEC (ref 9.3–12.4)
PROTHROMBIN TIME: 16.3 SEC (ref 9.3–12.4)
PROTHROMBIN TIME: 16.3 SEC (ref 9.3–12.4)
PROTHROMBIN TIME: 16.6 SEC (ref 9.3–12.4)
PROTHROMBIN TIME: 16.8 SEC (ref 9.3–12.4)
PROTHROMBIN TIME: 17.2 SEC (ref 9.3–12.4)
PROTHROMBIN TIME: 17.6 SEC (ref 9.3–12.4)
PROTHROMBIN TIME: 18.1 SEC (ref 9.3–12.4)
PROTHROMBIN TIME: 19.3 SEC (ref 9.3–12.4)
PROTHROMBIN TIME: 20 SEC (ref 9.3–12.4)
PROTHROMBIN TIME: 21.7 SEC (ref 9.3–12.4)
PROTHROMBIN TIME: 21.8 SEC (ref 9.3–12.4)
PROTHROMBIN TIME: 23.2 SEC (ref 9.3–12.4)
PROTHROMBIN TIME: 25 SEC (ref 9.3–12.4)
PROTHROMBIN TIME: 25.8 SEC (ref 9.3–12.4)
PROTHROMBIN TIME: 27.7 SEC (ref 9.3–12.4)
PROTHROMBIN TIME: 27.9 SEC (ref 9.3–12.4)
PROTHROMBIN TIME: 30.9 SEC (ref 9.3–12.4)
PROTHROMBIN TIME: 32.1 SEC (ref 9.3–12.4)
PROTHROMBIN TIME: 32.4 SEC (ref 9.3–12.4)
PROTHROMBIN TIME: 35.5 SEC (ref 9.3–12.4)
PROTHROMBIN TIME: 35.6 SEC (ref 9.3–12.4)
PROTHROMBIN TIME: 37.1 SEC (ref 9.3–12.4)
PROTHROMBIN TIME: 54.8 SEC (ref 9.3–12.4)
PROTHROMBIN TIME: 75.7 SEC (ref 9.3–12.4)
PROTHROMBIN TIME: >120 SEC (ref 9.3–12.4)
PS: 12 CMH20
PS: 15 CMH20
RBC # BLD AUTO: 2.56 M/UL (ref 3.8–5.8)
RBC # BLD AUTO: 2.66 M/UL (ref 3.8–5.8)
RBC # BLD AUTO: 2.68 M/UL (ref 3.8–5.8)
RBC # BLD AUTO: 2.75 M/UL (ref 3.8–5.8)
RBC # BLD AUTO: 2.82 M/UL (ref 3.8–5.8)
RBC # BLD AUTO: 2.89 M/UL (ref 3.8–5.8)
RBC # BLD AUTO: 2.91 M/UL (ref 3.8–5.8)
RBC # BLD AUTO: 2.91 M/UL (ref 3.8–5.8)
RBC # BLD AUTO: 2.93 M/UL (ref 3.8–5.8)
RBC # BLD AUTO: 2.94 M/UL (ref 3.8–5.8)
RBC # BLD AUTO: 2.95 M/UL (ref 3.8–5.8)
RBC # BLD AUTO: 2.95 M/UL (ref 3.8–5.8)
RBC # BLD AUTO: 2.96 M/UL (ref 3.8–5.8)
RBC # BLD AUTO: 2.97 M/UL (ref 3.8–5.8)
RBC # BLD AUTO: 2.98 M/UL (ref 3.8–5.8)
RBC # BLD AUTO: 3.03 M/UL (ref 3.8–5.8)
RBC # BLD AUTO: 3.04 M/UL (ref 3.8–5.8)
RBC # BLD AUTO: 3.05 M/UL (ref 3.8–5.8)
RBC # BLD AUTO: 3.06 M/UL (ref 3.8–5.8)
RBC # BLD AUTO: 3.1 M/UL (ref 3.8–5.8)
RBC # BLD AUTO: 3.14 M/UL (ref 3.8–5.8)
RBC # BLD AUTO: 3.17 M/UL (ref 3.8–5.8)
RBC # BLD AUTO: 3.2 M/UL (ref 3.8–5.8)
RBC # BLD AUTO: 3.27 M/UL (ref 3.8–5.8)
RBC # BLD AUTO: 3.28 M/UL (ref 3.8–5.8)
RBC # BLD AUTO: 3.31 M/UL (ref 3.8–5.8)
RBC # BLD AUTO: 3.43 M/UL (ref 3.8–5.8)
RBC # BLD: ABNORMAL 10*6/UL
RBC #/AREA URNS HPF: ABNORMAL /HPF
RBC #/AREA URNS HPF: ABNORMAL /HPF
REACTION TIME TEG: 6.2 MIN (ref 5–10)
REACTION TIME TEG: 7.6 MIN (ref 5–10)
RI(T): 0.61
RI(T): 0.64
RI(T): 0.67
RI(T): 0.72
RI(T): 0.77
RI(T): 0.79
RI(T): 0.93
RI(T): 0.94
RI(T): 1.08
RI(T): 1.11
RI(T): 1.13
RI(T): 1.18
RI(T): 1.23
RI(T): 1.31
RI(T): 1.35
RI(T): 1.43
RI(T): 1.49
RI(T): 1.51
RI(T): 1.56
RI(T): 3.32
RI(T): 4.08
RI(T): 6.08
RR MECHANICAL: 14 B/MIN
RR MECHANICAL: 14 B/MIN
RR MECHANICAL: 16 B/MIN
RR MECHANICAL: 18 B/MIN
RR MECHANICAL: ABNORMAL B/MIN
RSV RNA NPH QL NAA+NON-PROBE: NOT DETECTED
RV+EV RNA NPH QL NAA+NON-PROBE: NOT DETECTED
S PNEUM AG SPEC QL: NEGATIVE
S PNEUM AG SPEC QL: NEGATIVE
SALICYLATES SERPL-MCNC: <0.5 MG/DL (ref 0–30)
SARS-COV-2 RNA NPH QL NAA+NON-PROBE: NOT DETECTED
SERVICE CMNT-IMP: NORMAL
SET RATE, POC: 18
SODIUM SERPL-SCNC: 141 MMOL/L (ref 136–145)
SODIUM SERPL-SCNC: 142 MMOL/L (ref 132–146)
SODIUM SERPL-SCNC: 142 MMOL/L (ref 136–145)
SODIUM SERPL-SCNC: 142 MMOL/L (ref 136–145)
SODIUM SERPL-SCNC: 143 MMOL/L (ref 132–146)
SODIUM SERPL-SCNC: 143 MMOL/L (ref 136–145)
SODIUM SERPL-SCNC: 144 MMOL/L (ref 136–145)
SODIUM SERPL-SCNC: 145 MMOL/L (ref 132–146)
SODIUM SERPL-SCNC: 145 MMOL/L (ref 136–145)
SODIUM SERPL-SCNC: 146 MMOL/L (ref 132–146)
SODIUM SERPL-SCNC: 146 MMOL/L (ref 132–146)
SODIUM SERPL-SCNC: 146 MMOL/L (ref 136–145)
SODIUM SERPL-SCNC: 147 MMOL/L (ref 132–146)
SODIUM SERPL-SCNC: 147 MMOL/L (ref 136–145)
SODIUM SERPL-SCNC: 148 MMOL/L (ref 132–146)
SODIUM SERPL-SCNC: 148 MMOL/L (ref 136–145)
SODIUM SERPL-SCNC: 148 MMOL/L (ref 136–145)
SODIUM SERPL-SCNC: 149 MMOL/L (ref 136–145)
SODIUM SERPL-SCNC: 150 MMOL/L (ref 132–146)
SODIUM SERPL-SCNC: 150 MMOL/L (ref 132–146)
SODIUM SERPL-SCNC: 150 MMOL/L (ref 136–145)
SODIUM SERPL-SCNC: 151 MMOL/L (ref 136–145)
SODIUM SERPL-SCNC: 151 MMOL/L (ref 136–145)
SODIUM SERPL-SCNC: 152 MMOL/L (ref 136–145)
SODIUM SERPL-SCNC: 152 MMOL/L (ref 136–145)
SODIUM UR-SCNC: 26 MMOL/L
SOURCE, BLOOD GAS: ABNORMAL
SP GR UR STRIP: 1.02 (ref 1–1.03)
SP GR UR STRIP: 1.02 (ref 1–1.03)
SPECIMEN DESCRIPTION: ABNORMAL
SPECIMEN DESCRIPTION: ABNORMAL
SPECIMEN DESCRIPTION: NORMAL
SPECIMEN SOURCE: NORMAL
SPECIMEN SOURCE: NORMAL
TEST INFORMATION: ABNORMAL
TEST INFORMATION: NORMAL
THB: 10.1 G/DL (ref 11.5–16.5)
THB: 7.8 G/DL (ref 11.5–16.5)
THB: 8 G/DL (ref 11.5–16.5)
THB: 8 G/DL (ref 11.5–16.5)
THB: 8.1 G/DL (ref 11.5–16.5)
THB: 8.1 G/DL (ref 11.5–16.5)
THB: 8.2 G/DL (ref 11.5–16.5)
THB: 8.3 G/DL (ref 11.5–16.5)
THB: 8.4 G/DL (ref 11.5–16.5)
THB: 8.4 G/DL (ref 11.5–16.5)
THB: 8.5 G/DL (ref 11.5–16.5)
THB: 8.5 G/DL (ref 11.5–16.5)
THB: 8.6 G/DL (ref 11.5–16.5)
THB: 8.7 G/DL (ref 11.5–16.5)
THB: 8.8 G/DL (ref 11.5–16.5)
THB: 8.9 G/DL (ref 11.5–16.5)
THB: 9 G/DL (ref 11.5–16.5)
THB: 9 G/DL (ref 11.5–16.5)
THB: 9.1 G/DL (ref 11.5–16.5)
THB: 9.4 G/DL (ref 11.5–16.5)
THB: 9.5 G/DL (ref 11.5–16.5)
THB: 9.6 G/DL (ref 11.5–16.5)
THB: 9.6 G/DL (ref 11.5–16.5)
THB: 9.7 G/DL (ref 11.5–16.5)
TIBC SERPL-MCNC: 253 UG/DL (ref 250–450)
TIDAL VOLUME: 450
TIME ANALYZED: 1209
TIME ANALYZED: 1211
TIME ANALYZED: 1327
TIME ANALYZED: 1450
TIME ANALYZED: 1607
TIME ANALYZED: 1612
TIME ANALYZED: 1615
TIME ANALYZED: 1715
TIME ANALYZED: 1840
TIME ANALYZED: 1923
TIME ANALYZED: 1937
TIME ANALYZED: 2015
TIME ANALYZED: 2106
TIME ANALYZED: 2230
TIME ANALYZED: 232
TIME ANALYZED: 2325
TIME ANALYZED: 2358
TIME ANALYZED: 404
TIME ANALYZED: 417
TIME ANALYZED: 418
TIME ANALYZED: 421
TIME ANALYZED: 426
TIME ANALYZED: 427
TIME ANALYZED: 430
TIME ANALYZED: 439
TIME ANALYZED: 440
TIME ANALYZED: 451
TIME ANALYZED: 459
TIME ANALYZED: 500
TIME ANALYZED: 543
TIME ANALYZED: 606
TIME ANALYZED: 609
TIME ANALYZED: 901
TIME ANALYZED: 924
TME LAST DOSE: NORMAL H
TOXIC TRICYCLIC SC,BLOOD: NEGATIVE
TRANSFUSION STATUS: NORMAL
TROPONIN I SERPL HS-MCNC: 67 NG/L (ref 0–22)
TROPONIN I SERPL HS-MCNC: 68 NG/L (ref 0–22)
TROPONIN I SERPL HS-MCNC: 74 NG/L (ref 0–22)
TSH SERPL DL<=0.05 MIU/L-ACNC: 2.98 UIU/ML (ref 0.27–4.2)
UNIT DIVISION: 0
UNIT ISSUE DATE/TIME: NORMAL
UNIT ISSUE DATE/TIME: NORMAL
UROBILINOGEN UR STRIP-ACNC: 0.2 EU/DL (ref 0–1)
UROBILINOGEN UR STRIP-ACNC: 0.2 EU/DL (ref 0–1)
VANCOMYCIN DOSE: NORMAL MG
VANCOMYCIN SERPL-MCNC: 11.7 UG/ML (ref 5–40)
VANCOMYCIN SERPL-MCNC: 19.8 UG/ML (ref 5–40)
VANCOMYCIN SERPL-MCNC: 20.5 UG/ML (ref 5–40)
VANCOMYCIN SERPL-MCNC: 27.4 UG/ML (ref 5–40)
VIT B12 SERPL-MCNC: >2000 PG/ML (ref 232–1245)
VT MECHANICAL: 450 ML
VT MECHANICAL: ABNORMAL ML
WBC #/AREA URNS HPF: ABNORMAL /HPF
WBC #/AREA URNS HPF: ABNORMAL /HPF
WBC OTHER # BLD: 10 K/UL (ref 4.5–11.5)
WBC OTHER # BLD: 10 K/UL (ref 4.5–11.5)
WBC OTHER # BLD: 10.5 K/UL (ref 4.5–11.5)
WBC OTHER # BLD: 11 K/UL (ref 4.5–11.5)
WBC OTHER # BLD: 11.1 K/UL (ref 4.5–11.5)
WBC OTHER # BLD: 11.2 K/UL (ref 4.5–11.5)
WBC OTHER # BLD: 11.7 K/UL (ref 4.5–11.5)
WBC OTHER # BLD: 12.1 K/UL (ref 4.5–11.5)
WBC OTHER # BLD: 12.3 K/UL (ref 4.5–11.5)
WBC OTHER # BLD: 12.4 K/UL (ref 4.5–11.5)
WBC OTHER # BLD: 12.9 K/UL (ref 4.5–11.5)
WBC OTHER # BLD: 13 K/UL (ref 4.5–11.5)
WBC OTHER # BLD: 13 K/UL (ref 4.5–11.5)
WBC OTHER # BLD: 13.3 K/UL (ref 4.5–11.5)
WBC OTHER # BLD: 13.6 K/UL (ref 4.5–11.5)
WBC OTHER # BLD: 13.9 K/UL (ref 4.5–11.5)
WBC OTHER # BLD: 14 K/UL (ref 4.5–11.5)
WBC OTHER # BLD: 14.9 K/UL (ref 4.5–11.5)
WBC OTHER # BLD: 15.1 K/UL (ref 4.5–11.5)
WBC OTHER # BLD: 15.5 K/UL (ref 4.5–11.5)
WBC OTHER # BLD: 16.1 K/UL (ref 4.5–11.5)
WBC OTHER # BLD: 16.2 K/UL (ref 4.5–11.5)
WBC OTHER # BLD: 5.8 K/UL (ref 4.5–11.5)
WBC OTHER # BLD: 6.8 K/UL (ref 4.5–11.5)
WBC OTHER # BLD: 6.8 K/UL (ref 4.5–11.5)
WBC OTHER # BLD: 7 K/UL (ref 4.5–11.5)
WBC OTHER # BLD: 8 K/UL (ref 4.5–11.5)
WBC OTHER # BLD: 9.4 K/UL (ref 4.5–11.5)
WBC OTHER # BLD: 9.5 K/UL (ref 4.5–11.5)
WBC OTHER # BLD: 9.5 K/UL (ref 4.5–11.5)

## 2025-01-01 PROCEDURE — 99232 SBSQ HOSP IP/OBS MODERATE 35: CPT

## 2025-01-01 PROCEDURE — 82803 BLOOD GASES ANY COMBINATION: CPT

## 2025-01-01 PROCEDURE — 85025 COMPLETE CBC W/AUTO DIFF WBC: CPT

## 2025-01-01 PROCEDURE — 82140 ASSAY OF AMMONIA: CPT

## 2025-01-01 PROCEDURE — 2580000003 HC RX 258: Performed by: INTERNAL MEDICINE

## 2025-01-01 PROCEDURE — 6370000000 HC RX 637 (ALT 250 FOR IP): Performed by: STUDENT IN AN ORGANIZED HEALTH CARE EDUCATION/TRAINING PROGRAM

## 2025-01-01 PROCEDURE — 71045 X-RAY EXAM CHEST 1 VIEW: CPT

## 2025-01-01 PROCEDURE — 94003 VENT MGMT INPAT SUBQ DAY: CPT

## 2025-01-01 PROCEDURE — 82010 KETONE BODYS QUAN: CPT

## 2025-01-01 PROCEDURE — 06HY33Z INSERTION OF INFUSION DEVICE INTO LOWER VEIN, PERCUTANEOUS APPROACH: ICD-10-PCS | Performed by: INTERNAL MEDICINE

## 2025-01-01 PROCEDURE — 88112 CYTOPATH CELL ENHANCE TECH: CPT

## 2025-01-01 PROCEDURE — 2580000003 HC RX 258

## 2025-01-01 PROCEDURE — 6360000002 HC RX W HCPCS

## 2025-01-01 PROCEDURE — XX20X89 MONITORING OF BRAIN ELECTRICAL ACTIVITY, COMPUTER-AIDED DETECTION AND NOTIFICATION, NEW TECHNOLOGY GROUP 9: ICD-10-PCS | Performed by: PSYCHIATRY & NEUROLOGY

## 2025-01-01 PROCEDURE — 82962 GLUCOSE BLOOD TEST: CPT

## 2025-01-01 PROCEDURE — 2500000003 HC RX 250 WO HCPCS

## 2025-01-01 PROCEDURE — 6370000000 HC RX 637 (ALT 250 FOR IP)

## 2025-01-01 PROCEDURE — 80053 COMPREHEN METABOLIC PANEL: CPT

## 2025-01-01 PROCEDURE — 84100 ASSAY OF PHOSPHORUS: CPT

## 2025-01-01 PROCEDURE — 6360000002 HC RX W HCPCS: Performed by: STUDENT IN AN ORGANIZED HEALTH CARE EDUCATION/TRAINING PROGRAM

## 2025-01-01 PROCEDURE — 51798 US URINE CAPACITY MEASURE: CPT

## 2025-01-01 PROCEDURE — 6370000000 HC RX 637 (ALT 250 FOR IP): Performed by: NURSE PRACTITIONER

## 2025-01-01 PROCEDURE — 94660 CPAP INITIATION&MGMT: CPT

## 2025-01-01 PROCEDURE — 82805 BLOOD GASES W/O2 SATURATION: CPT

## 2025-01-01 PROCEDURE — 36415 COLL VENOUS BLD VENIPUNCTURE: CPT

## 2025-01-01 PROCEDURE — 85610 PROTHROMBIN TIME: CPT

## 2025-01-01 PROCEDURE — 6360000002 HC RX W HCPCS: Performed by: PSYCHIATRY & NEUROLOGY

## 2025-01-01 PROCEDURE — 76937 US GUIDE VASCULAR ACCESS: CPT

## 2025-01-01 PROCEDURE — 85027 COMPLETE CBC AUTOMATED: CPT

## 2025-01-01 PROCEDURE — 97530 THERAPEUTIC ACTIVITIES: CPT

## 2025-01-01 PROCEDURE — 6360000002 HC RX W HCPCS: Performed by: INTERNAL MEDICINE

## 2025-01-01 PROCEDURE — 2000000000 HC ICU R&B

## 2025-01-01 PROCEDURE — 85014 HEMATOCRIT: CPT

## 2025-01-01 PROCEDURE — 82248 BILIRUBIN DIRECT: CPT

## 2025-01-01 PROCEDURE — 94640 AIRWAY INHALATION TREATMENT: CPT

## 2025-01-01 PROCEDURE — 87205 SMEAR GRAM STAIN: CPT

## 2025-01-01 PROCEDURE — 85730 THROMBOPLASTIN TIME PARTIAL: CPT

## 2025-01-01 PROCEDURE — 85018 HEMOGLOBIN: CPT

## 2025-01-01 PROCEDURE — 86900 BLOOD TYPING SEROLOGIC ABO: CPT

## 2025-01-01 PROCEDURE — 2580000003 HC RX 258: Performed by: PSYCHIATRY & NEUROLOGY

## 2025-01-01 PROCEDURE — 6370000000 HC RX 637 (ALT 250 FOR IP): Performed by: PSYCHIATRY & NEUROLOGY

## 2025-01-01 PROCEDURE — 3E043XZ INTRODUCTION OF VASOPRESSOR INTO CENTRAL VEIN, PERCUTANEOUS APPROACH: ICD-10-PCS | Performed by: INTERNAL MEDICINE

## 2025-01-01 PROCEDURE — 83735 ASSAY OF MAGNESIUM: CPT

## 2025-01-01 PROCEDURE — 84484 ASSAY OF TROPONIN QUANT: CPT

## 2025-01-01 PROCEDURE — 81001 URINALYSIS AUTO W/SCOPE: CPT

## 2025-01-01 PROCEDURE — 2700000000 HC OXYGEN THERAPY PER DAY

## 2025-01-01 PROCEDURE — 82570 ASSAY OF URINE CREATININE: CPT

## 2025-01-01 PROCEDURE — 36592 COLLECT BLOOD FROM PICC: CPT

## 2025-01-01 PROCEDURE — 95819 EEG AWAKE AND ASLEEP: CPT

## 2025-01-01 PROCEDURE — 93005 ELECTROCARDIOGRAM TRACING: CPT

## 2025-01-01 PROCEDURE — C9254 INJECTION, LACOSAMIDE: HCPCS | Performed by: PSYCHIATRY & NEUROLOGY

## 2025-01-01 PROCEDURE — 0202U NFCT DS 22 TRGT SARS-COV-2: CPT

## 2025-01-01 PROCEDURE — 93971 EXTREMITY STUDY: CPT

## 2025-01-01 PROCEDURE — 51702 INSERT TEMP BLADDER CATH: CPT

## 2025-01-01 PROCEDURE — 80048 BASIC METABOLIC PNL TOTAL CA: CPT

## 2025-01-01 PROCEDURE — 82533 TOTAL CORTISOL: CPT

## 2025-01-01 PROCEDURE — 85390 FIBRINOLYSINS SCREEN I&R: CPT

## 2025-01-01 PROCEDURE — 86901 BLOOD TYPING SEROLOGIC RH(D): CPT

## 2025-01-01 PROCEDURE — 83930 ASSAY OF BLOOD OSMOLALITY: CPT

## 2025-01-01 PROCEDURE — 30233N1 TRANSFUSION OF NONAUTOLOGOUS RED BLOOD CELLS INTO PERIPHERAL VEIN, PERCUTANEOUS APPROACH: ICD-10-PCS | Performed by: INTERNAL MEDICINE

## 2025-01-01 PROCEDURE — 5A09357 ASSISTANCE WITH RESPIRATORY VENTILATION, LESS THAN 24 CONSECUTIVE HOURS, CONTINUOUS POSITIVE AIRWAY PRESSURE: ICD-10-PCS | Performed by: INTERNAL MEDICINE

## 2025-01-01 PROCEDURE — 2140000000 HC CCU INTERMEDIATE R&B

## 2025-01-01 PROCEDURE — 2500000003 HC RX 250 WO HCPCS: Performed by: NURSE PRACTITIONER

## 2025-01-01 PROCEDURE — 87106 FUNGI IDENTIFICATION YEAST: CPT

## 2025-01-01 PROCEDURE — 99223 1ST HOSP IP/OBS HIGH 75: CPT | Performed by: PSYCHIATRY & NEUROLOGY

## 2025-01-01 PROCEDURE — 85576 BLOOD PLATELET AGGREGATION: CPT

## 2025-01-01 PROCEDURE — 84145 PROCALCITONIN (PCT): CPT

## 2025-01-01 PROCEDURE — 70450 CT HEAD/BRAIN W/O DYE: CPT

## 2025-01-01 PROCEDURE — 6360000002 HC RX W HCPCS: Performed by: FAMILY MEDICINE

## 2025-01-01 PROCEDURE — 87070 CULTURE OTHR SPECIMN AEROBIC: CPT

## 2025-01-01 PROCEDURE — 97535 SELF CARE MNGMENT TRAINING: CPT

## 2025-01-01 PROCEDURE — 3E0G76Z INTRODUCTION OF NUTRITIONAL SUBSTANCE INTO UPPER GI, VIA NATURAL OR ARTIFICIAL OPENING: ICD-10-PCS | Performed by: INTERNAL MEDICINE

## 2025-01-01 PROCEDURE — 74176 CT ABD & PELVIS W/O CONTRAST: CPT

## 2025-01-01 PROCEDURE — 36430 TRANSFUSION BLD/BLD COMPNT: CPT

## 2025-01-01 PROCEDURE — 99232 SBSQ HOSP IP/OBS MODERATE 35: CPT | Performed by: INTERNAL MEDICINE

## 2025-01-01 PROCEDURE — 85347 COAGULATION TIME ACTIVATED: CPT

## 2025-01-01 PROCEDURE — 99285 EMERGENCY DEPT VISIT HI MDM: CPT

## 2025-01-01 PROCEDURE — 5A1955Z RESPIRATORY VENTILATION, GREATER THAN 96 CONSECUTIVE HOURS: ICD-10-PCS | Performed by: INTERNAL MEDICINE

## 2025-01-01 PROCEDURE — 93010 ELECTROCARDIOGRAM REPORT: CPT | Performed by: INTERNAL MEDICINE

## 2025-01-01 PROCEDURE — 36556 INSERT NON-TUNNEL CV CATH: CPT

## 2025-01-01 PROCEDURE — 83605 ASSAY OF LACTIC ACID: CPT

## 2025-01-01 PROCEDURE — 0DH67UZ INSERTION OF FEEDING DEVICE INTO STOMACH, VIA NATURAL OR ARTIFICIAL OPENING: ICD-10-PCS | Performed by: STUDENT IN AN ORGANIZED HEALTH CARE EDUCATION/TRAINING PROGRAM

## 2025-01-01 PROCEDURE — 87449 NOS EACH ORGANISM AG IA: CPT

## 2025-01-01 PROCEDURE — 87081 CULTURE SCREEN ONLY: CPT

## 2025-01-01 PROCEDURE — 83880 ASSAY OF NATRIURETIC PEPTIDE: CPT

## 2025-01-01 PROCEDURE — 2500000003 HC RX 250 WO HCPCS: Performed by: PSYCHIATRY & NEUROLOGY

## 2025-01-01 PROCEDURE — 96375 TX/PRO/DX INJ NEW DRUG ADDON: CPT

## 2025-01-01 PROCEDURE — 6360000002 HC RX W HCPCS: Performed by: NURSE PRACTITIONER

## 2025-01-01 PROCEDURE — 02HV33Z INSERTION OF INFUSION DEVICE INTO SUPERIOR VENA CAVA, PERCUTANEOUS APPROACH: ICD-10-PCS | Performed by: INTERNAL MEDICINE

## 2025-01-01 PROCEDURE — 31500 INSERT EMERGENCY AIRWAY: CPT

## 2025-01-01 PROCEDURE — 80202 ASSAY OF VANCOMYCIN: CPT

## 2025-01-01 PROCEDURE — 96365 THER/PROPH/DIAG IV INF INIT: CPT

## 2025-01-01 PROCEDURE — C1751 CATH, INF, PER/CENT/MIDLINE: HCPCS

## 2025-01-01 PROCEDURE — 30233K1 TRANSFUSION OF NONAUTOLOGOUS FROZEN PLASMA INTO PERIPHERAL VEIN, PERCUTANEOUS APPROACH: ICD-10-PCS | Performed by: INTERNAL MEDICINE

## 2025-01-01 PROCEDURE — 4A133J1 MONITORING OF ARTERIAL PULSE, PERIPHERAL, PERCUTANEOUS APPROACH: ICD-10-PCS | Performed by: STUDENT IN AN ORGANIZED HEALTH CARE EDUCATION/TRAINING PROGRAM

## 2025-01-01 PROCEDURE — 83540 ASSAY OF IRON: CPT

## 2025-01-01 PROCEDURE — 87899 AGENT NOS ASSAY W/OPTIC: CPT

## 2025-01-01 PROCEDURE — 6360000002 HC RX W HCPCS: Performed by: EMERGENCY MEDICINE

## 2025-01-01 PROCEDURE — 1200000000 HC SEMI PRIVATE

## 2025-01-01 PROCEDURE — 87040 BLOOD CULTURE FOR BACTERIA: CPT

## 2025-01-01 PROCEDURE — 80143 DRUG ASSAY ACETAMINOPHEN: CPT

## 2025-01-01 PROCEDURE — G0480 DRUG TEST DEF 1-7 CLASSES: HCPCS

## 2025-01-01 PROCEDURE — 6370000000 HC RX 637 (ALT 250 FOR IP): Performed by: INTERNAL MEDICINE

## 2025-01-01 PROCEDURE — P9047 ALBUMIN (HUMAN), 25%, 50ML: HCPCS

## 2025-01-01 PROCEDURE — 4A133B3 MONITORING OF ARTERIAL PRESSURE, PULMONARY, PERCUTANEOUS APPROACH: ICD-10-PCS | Performed by: STUDENT IN AN ORGANIZED HEALTH CARE EDUCATION/TRAINING PROGRAM

## 2025-01-01 PROCEDURE — 99291 CRITICAL CARE FIRST HOUR: CPT | Performed by: INTERNAL MEDICINE

## 2025-01-01 PROCEDURE — 82550 ASSAY OF CK (CPK): CPT

## 2025-01-01 PROCEDURE — 93005 ELECTROCARDIOGRAM TRACING: CPT | Performed by: INTERNAL MEDICINE

## 2025-01-01 PROCEDURE — 84300 ASSAY OF URINE SODIUM: CPT

## 2025-01-01 PROCEDURE — 86923 COMPATIBILITY TEST ELECTRIC: CPT

## 2025-01-01 PROCEDURE — 0BH17EZ INSERTION OF ENDOTRACHEAL AIRWAY INTO TRACHEA, VIA NATURAL OR ARTIFICIAL OPENING: ICD-10-PCS | Performed by: INTERNAL MEDICINE

## 2025-01-01 PROCEDURE — 84132 ASSAY OF SERUM POTASSIUM: CPT

## 2025-01-01 PROCEDURE — 2580000003 HC RX 258: Performed by: NURSE PRACTITIONER

## 2025-01-01 PROCEDURE — 80179 DRUG ASSAY SALICYLATE: CPT

## 2025-01-01 PROCEDURE — 2500000003 HC RX 250 WO HCPCS: Performed by: STUDENT IN AN ORGANIZED HEALTH CARE EDUCATION/TRAINING PROGRAM

## 2025-01-01 PROCEDURE — 02HQ32Z INSERTION OF MONITORING DEVICE INTO RIGHT PULMONARY ARTERY, PERCUTANEOUS APPROACH: ICD-10-PCS | Performed by: STUDENT IN AN ORGANIZED HEALTH CARE EDUCATION/TRAINING PROGRAM

## 2025-01-01 PROCEDURE — 2500000003 HC RX 250 WO HCPCS: Performed by: FAMILY MEDICINE

## 2025-01-01 PROCEDURE — 99222 1ST HOSP IP/OBS MODERATE 55: CPT

## 2025-01-01 PROCEDURE — 82607 VITAMIN B-12: CPT

## 2025-01-01 PROCEDURE — 2580000003 HC RX 258: Performed by: STUDENT IN AN ORGANIZED HEALTH CARE EDUCATION/TRAINING PROGRAM

## 2025-01-01 PROCEDURE — 92526 ORAL FUNCTION THERAPY: CPT | Performed by: SPEECH-LANGUAGE PATHOLOGIST

## 2025-01-01 PROCEDURE — 94002 VENT MGMT INPAT INIT DAY: CPT

## 2025-01-01 PROCEDURE — 97166 OT EVAL MOD COMPLEX 45 MIN: CPT

## 2025-01-01 PROCEDURE — 4A1239Z MONITORING OF CARDIAC OUTPUT, PERCUTANEOUS APPROACH: ICD-10-PCS | Performed by: STUDENT IN AN ORGANIZED HEALTH CARE EDUCATION/TRAINING PROGRAM

## 2025-01-01 PROCEDURE — 86850 RBC ANTIBODY SCREEN: CPT

## 2025-01-01 PROCEDURE — 95706 EEG WO VID 2-12HR INTMT MNTR: CPT

## 2025-01-01 PROCEDURE — 97162 PT EVAL MOD COMPLEX 30 MIN: CPT

## 2025-01-01 PROCEDURE — 82746 ASSAY OF FOLIC ACID SERUM: CPT

## 2025-01-01 PROCEDURE — 92611 MOTION FLUOROSCOPY/SWALLOW: CPT | Performed by: SPEECH-LANGUAGE PATHOLOGIST

## 2025-01-01 PROCEDURE — P9017 PLASMA 1 DONOR FRZ W/IN 8 HR: HCPCS

## 2025-01-01 PROCEDURE — 92526 ORAL FUNCTION THERAPY: CPT

## 2025-01-01 PROCEDURE — 85384 FIBRINOGEN ACTIVITY: CPT

## 2025-01-01 PROCEDURE — 30233L1 TRANSFUSION OF NONAUTOLOGOUS FRESH PLASMA INTO PERIPHERAL VEIN, PERCUTANEOUS APPROACH: ICD-10-PCS | Performed by: INTERNAL MEDICINE

## 2025-01-01 PROCEDURE — 92610 EVALUATE SWALLOWING FUNCTION: CPT | Performed by: SPEECH-LANGUAGE PATHOLOGIST

## 2025-01-01 PROCEDURE — 76705 ECHO EXAM OF ABDOMEN: CPT

## 2025-01-01 PROCEDURE — 6370000000 HC RX 637 (ALT 250 FOR IP): Performed by: FAMILY MEDICINE

## 2025-01-01 PROCEDURE — 86738 MYCOPLASMA ANTIBODY: CPT

## 2025-01-01 PROCEDURE — 87086 URINE CULTURE/COLONY COUNT: CPT

## 2025-01-01 PROCEDURE — 80307 DRUG TEST PRSMV CHEM ANLYZR: CPT

## 2025-01-01 PROCEDURE — 82306 VITAMIN D 25 HYDROXY: CPT

## 2025-01-01 PROCEDURE — 2580000003 HC RX 258: Performed by: FAMILY MEDICINE

## 2025-01-01 PROCEDURE — 83550 IRON BINDING TEST: CPT

## 2025-01-01 PROCEDURE — P9016 RBC LEUKOCYTES REDUCED: HCPCS

## 2025-01-01 PROCEDURE — 82105 ALPHA-FETOPROTEIN SERUM: CPT

## 2025-01-01 PROCEDURE — 4A133B1 MONITORING OF ARTERIAL PRESSURE, PERIPHERAL, PERCUTANEOUS APPROACH: ICD-10-PCS | Performed by: STUDENT IN AN ORGANIZED HEALTH CARE EDUCATION/TRAINING PROGRAM

## 2025-01-01 PROCEDURE — 86927 PLASMA FRESH FROZEN: CPT

## 2025-01-01 PROCEDURE — 03HY32Z INSERTION OF MONITORING DEVICE INTO UPPER ARTERY, PERCUTANEOUS APPROACH: ICD-10-PCS | Performed by: STUDENT IN AN ORGANIZED HEALTH CARE EDUCATION/TRAINING PROGRAM

## 2025-01-01 PROCEDURE — 84443 ASSAY THYROID STIM HORMONE: CPT

## 2025-01-01 PROCEDURE — 82728 ASSAY OF FERRITIN: CPT

## 2025-01-01 PROCEDURE — 36600 WITHDRAWAL OF ARTERIAL BLOOD: CPT

## 2025-01-01 PROCEDURE — 74230 X-RAY XM SWLNG FUNCJ C+: CPT

## 2025-01-01 PROCEDURE — 74018 RADEX ABDOMEN 1 VIEW: CPT

## 2025-01-01 PROCEDURE — C9254 INJECTION, LACOSAMIDE: HCPCS

## 2025-01-01 RX ORDER — METOCLOPRAMIDE HYDROCHLORIDE 5 MG/ML
5 INJECTION INTRAMUSCULAR; INTRAVENOUS EVERY 6 HOURS
Status: DISPENSED | OUTPATIENT
Start: 2025-01-01 | End: 2025-01-01

## 2025-01-01 RX ORDER — MIDODRINE HYDROCHLORIDE 10 MG/1
20 TABLET ORAL
Status: DISCONTINUED | OUTPATIENT
Start: 2025-01-01 | End: 2025-01-01

## 2025-01-01 RX ORDER — BUMETANIDE 0.25 MG/ML
1 INJECTION, SOLUTION INTRAMUSCULAR; INTRAVENOUS 2 TIMES DAILY
Status: DISCONTINUED | OUTPATIENT
Start: 2025-01-01 | End: 2025-01-01

## 2025-01-01 RX ORDER — SODIUM CHLORIDE 9 MG/ML
INJECTION, SOLUTION INTRAVENOUS PRN
Status: DISCONTINUED | OUTPATIENT
Start: 2025-01-01 | End: 2025-01-01 | Stop reason: HOSPADM

## 2025-01-01 RX ORDER — INDOMETHACIN 25 MG/1
50 CAPSULE ORAL ONCE
Status: COMPLETED | OUTPATIENT
Start: 2025-01-01 | End: 2025-01-01

## 2025-01-01 RX ORDER — PANTOPRAZOLE SODIUM 40 MG/10ML
40 INJECTION, POWDER, LYOPHILIZED, FOR SOLUTION INTRAVENOUS 2 TIMES DAILY
Status: DISCONTINUED | OUTPATIENT
Start: 2025-01-01 | End: 2025-01-01

## 2025-01-01 RX ORDER — LANOLIN ALCOHOL/MO/W.PET/CERES
50 CREAM (GRAM) TOPICAL DAILY
Status: DISCONTINUED | OUTPATIENT
Start: 2025-01-01 | End: 2025-01-01

## 2025-01-01 RX ORDER — POTASSIUM CHLORIDE 29.8 MG/ML
20 INJECTION INTRAVENOUS ONCE
Status: COMPLETED | OUTPATIENT
Start: 2025-01-01 | End: 2025-01-01

## 2025-01-01 RX ORDER — SODIUM CHLORIDE FOR INHALATION 3 %
4 VIAL, NEBULIZER (ML) INHALATION 2 TIMES DAILY
Status: DISCONTINUED | OUTPATIENT
Start: 2025-01-01 | End: 2025-01-01 | Stop reason: HOSPADM

## 2025-01-01 RX ORDER — THIAMINE HYDROCHLORIDE 100 MG/ML
500 INJECTION, SOLUTION INTRAMUSCULAR; INTRAVENOUS 3 TIMES DAILY
Status: COMPLETED | OUTPATIENT
Start: 2025-01-01 | End: 2025-01-01

## 2025-01-01 RX ORDER — POTASSIUM CHLORIDE 29.8 MG/ML
40 INJECTION INTRAVENOUS ONCE
Status: COMPLETED | OUTPATIENT
Start: 2025-01-01 | End: 2025-01-01

## 2025-01-01 RX ORDER — WARFARIN SODIUM 2.5 MG/1
TABLET ORAL
Qty: 30 TABLET | Refills: 0 | Status: SHIPPED | OUTPATIENT
Start: 2025-01-01 | End: 2025-01-01 | Stop reason: HOSPADM

## 2025-01-01 RX ORDER — WARFARIN SODIUM 3 MG/1
1.5 TABLET ORAL ONCE
Status: COMPLETED | OUTPATIENT
Start: 2025-01-01 | End: 2025-01-01

## 2025-01-01 RX ORDER — LIDOCAINE HYDROCHLORIDE 10 MG/ML
INJECTION, SOLUTION EPIDURAL; INFILTRATION; INTRACAUDAL; PERINEURAL
Status: COMPLETED
Start: 2025-01-01 | End: 2025-01-01

## 2025-01-01 RX ORDER — METOPROLOL TARTRATE 25 MG/1
12.5 TABLET, FILM COATED ORAL 2 TIMES DAILY
Status: DISCONTINUED | OUTPATIENT
Start: 2025-01-01 | End: 2025-01-01 | Stop reason: HOSPADM

## 2025-01-01 RX ORDER — GUAIFENESIN 400 MG/1
400 TABLET ORAL 3 TIMES DAILY
Status: DISCONTINUED | OUTPATIENT
Start: 2025-01-01 | End: 2025-01-01 | Stop reason: SDUPTHER

## 2025-01-01 RX ORDER — ETOMIDATE 2 MG/ML
INJECTION INTRAVENOUS
Status: COMPLETED
Start: 2025-01-01 | End: 2025-01-01

## 2025-01-01 RX ORDER — BUSPIRONE HYDROCHLORIDE 5 MG/1
5 TABLET ORAL 3 TIMES DAILY
Status: DISCONTINUED | OUTPATIENT
Start: 2025-01-01 | End: 2025-01-01

## 2025-01-01 RX ORDER — MIDAZOLAM HYDROCHLORIDE 1 MG/ML
INJECTION, SOLUTION INTRAMUSCULAR; INTRAVENOUS
Status: COMPLETED
Start: 2025-01-01 | End: 2025-01-01

## 2025-01-01 RX ORDER — MIDODRINE HYDROCHLORIDE 5 MG/1
5 TABLET ORAL
Status: DISCONTINUED | OUTPATIENT
Start: 2025-01-01 | End: 2025-01-01

## 2025-01-01 RX ORDER — FOLIC ACID 1 MG/1
1 TABLET ORAL DAILY
Status: DISCONTINUED | OUTPATIENT
Start: 2025-01-01 | End: 2025-01-01 | Stop reason: HOSPADM

## 2025-01-01 RX ORDER — ACETAMINOPHEN 325 MG/1
650 TABLET ORAL EVERY 6 HOURS PRN
Status: DISCONTINUED | OUTPATIENT
Start: 2025-01-01 | End: 2025-01-01 | Stop reason: SDUPTHER

## 2025-01-01 RX ORDER — MAGNESIUM SULFATE IN WATER 40 MG/ML
2000 INJECTION, SOLUTION INTRAVENOUS PRN
Status: DISCONTINUED | OUTPATIENT
Start: 2025-01-01 | End: 2025-01-01

## 2025-01-01 RX ORDER — SODIUM CHLORIDE FOR INHALATION 3 %
4 VIAL, NEBULIZER (ML) INHALATION 2 TIMES DAILY
Qty: 30 ML | Refills: 0 | Status: SHIPPED | OUTPATIENT
Start: 2025-01-01 | End: 2025-01-01 | Stop reason: HOSPADM

## 2025-01-01 RX ORDER — FUROSEMIDE 40 MG/1
40 TABLET ORAL 2 TIMES DAILY
Status: DISCONTINUED | OUTPATIENT
Start: 2025-01-01 | End: 2025-01-01

## 2025-01-01 RX ORDER — GUAIFENESIN 600 MG/1
600 TABLET, EXTENDED RELEASE ORAL 2 TIMES DAILY
Status: DISCONTINUED | OUTPATIENT
Start: 2025-01-01 | End: 2025-01-01 | Stop reason: CLARIF

## 2025-01-01 RX ORDER — PANTOPRAZOLE SODIUM 40 MG/10ML
40 INJECTION, POWDER, LYOPHILIZED, FOR SOLUTION INTRAVENOUS 2 TIMES DAILY
Status: DISCONTINUED | OUTPATIENT
Start: 2025-01-01 | End: 2025-01-01 | Stop reason: HOSPADM

## 2025-01-01 RX ORDER — LORAZEPAM 2 MG/ML
1 INJECTION INTRAMUSCULAR EVERY 4 HOURS PRN
Status: DISCONTINUED | OUTPATIENT
Start: 2025-01-01 | End: 2025-01-01 | Stop reason: HOSPADM

## 2025-01-01 RX ORDER — SODIUM CHLORIDE 0.9 % (FLUSH) 0.9 %
5-40 SYRINGE (ML) INJECTION EVERY 12 HOURS SCHEDULED
Status: DISCONTINUED | OUTPATIENT
Start: 2025-01-01 | End: 2025-01-01 | Stop reason: HOSPADM

## 2025-01-01 RX ORDER — WARFARIN SODIUM 1 MG/1
0.5 TABLET ORAL DAILY
Status: DISCONTINUED | OUTPATIENT
Start: 2025-01-01 | End: 2025-01-01

## 2025-01-01 RX ORDER — WARFARIN SODIUM 1 MG/1
1 TABLET ORAL
Status: COMPLETED | OUTPATIENT
Start: 2025-01-01 | End: 2025-01-01

## 2025-01-01 RX ORDER — MIDAZOLAM HYDROCHLORIDE 1 MG/ML
1-10 INJECTION, SOLUTION INTRAVENOUS CONTINUOUS
Status: DISCONTINUED | OUTPATIENT
Start: 2025-01-01 | End: 2025-01-01

## 2025-01-01 RX ORDER — WARFARIN SODIUM 1 MG/1
TABLET ORAL
Qty: 30 TABLET | Refills: 0 | Status: SHIPPED | OUTPATIENT
Start: 2025-01-01 | End: 2025-01-01

## 2025-01-01 RX ORDER — DEXTROSE MONOHYDRATE 50 MG/ML
INJECTION, SOLUTION INTRAVENOUS CONTINUOUS
Status: ACTIVE | OUTPATIENT
Start: 2025-01-01 | End: 2025-01-01

## 2025-01-01 RX ORDER — THIAMINE HYDROCHLORIDE 100 MG/ML
100 INJECTION, SOLUTION INTRAMUSCULAR; INTRAVENOUS DAILY
Status: DISCONTINUED | OUTPATIENT
Start: 2025-01-01 | End: 2025-01-01 | Stop reason: HOSPADM

## 2025-01-01 RX ORDER — SENNA AND DOCUSATE SODIUM 50; 8.6 MG/1; MG/1
2 TABLET, FILM COATED ORAL 2 TIMES DAILY
Status: DISCONTINUED | OUTPATIENT
Start: 2025-01-01 | End: 2025-01-01 | Stop reason: HOSPADM

## 2025-01-01 RX ORDER — ISOSORBIDE DINITRATE 10 MG/1
10 TABLET ORAL 3 TIMES DAILY
Status: DISCONTINUED | OUTPATIENT
Start: 2025-01-01 | End: 2025-01-01

## 2025-01-01 RX ORDER — SODIUM CHLORIDE 9 MG/ML
INJECTION, SOLUTION INTRAVENOUS PRN
Status: DISCONTINUED | OUTPATIENT
Start: 2025-01-01 | End: 2025-01-01

## 2025-01-01 RX ORDER — THIAMINE HYDROCHLORIDE 100 MG/ML
100 INJECTION, SOLUTION INTRAMUSCULAR; INTRAVENOUS DAILY
Status: DISCONTINUED | OUTPATIENT
Start: 2025-01-01 | End: 2025-01-01 | Stop reason: SDUPTHER

## 2025-01-01 RX ORDER — LIDOCAINE 4 G/G
1 PATCH TOPICAL DAILY
Status: DISCONTINUED | OUTPATIENT
Start: 2025-01-01 | End: 2025-01-01 | Stop reason: HOSPADM

## 2025-01-01 RX ORDER — MIDAZOLAM HYDROCHLORIDE 2 MG/2ML
2 INJECTION, SOLUTION INTRAMUSCULAR; INTRAVENOUS ONCE
Status: COMPLETED | OUTPATIENT
Start: 2025-01-01 | End: 2025-01-01

## 2025-01-01 RX ORDER — DEXTROSE MONOHYDRATE 100 MG/ML
INJECTION, SOLUTION INTRAVENOUS CONTINUOUS PRN
Status: DISCONTINUED | OUTPATIENT
Start: 2025-01-01 | End: 2025-01-01 | Stop reason: HOSPADM

## 2025-01-01 RX ORDER — HEPARIN SODIUM 10000 [USP'U]/100ML
5-30 INJECTION, SOLUTION INTRAVENOUS CONTINUOUS
Status: DISCONTINUED | OUTPATIENT
Start: 2025-01-01 | End: 2025-01-01 | Stop reason: ALTCHOICE

## 2025-01-01 RX ORDER — MIDAZOLAM HYDROCHLORIDE 2 MG/2ML
2 INJECTION, SOLUTION INTRAMUSCULAR; INTRAVENOUS EVERY 4 HOURS PRN
Status: DISCONTINUED | OUTPATIENT
Start: 2025-01-01 | End: 2025-01-01

## 2025-01-01 RX ORDER — THIAMINE HYDROCHLORIDE 100 MG/ML
500 INJECTION, SOLUTION INTRAMUSCULAR; INTRAVENOUS 3 TIMES DAILY
Status: DISCONTINUED | OUTPATIENT
Start: 2025-01-01 | End: 2025-01-01 | Stop reason: SDUPTHER

## 2025-01-01 RX ORDER — WARFARIN SODIUM 2.5 MG/1
2.5 TABLET ORAL
Status: COMPLETED | OUTPATIENT
Start: 2025-01-01 | End: 2025-01-01

## 2025-01-01 RX ORDER — IPRATROPIUM BROMIDE AND ALBUTEROL SULFATE 2.5; .5 MG/3ML; MG/3ML
1 SOLUTION RESPIRATORY (INHALATION)
Status: DISCONTINUED | OUTPATIENT
Start: 2025-01-01 | End: 2025-01-01 | Stop reason: HOSPADM

## 2025-01-01 RX ORDER — MAGNESIUM SULFATE IN WATER 40 MG/ML
2000 INJECTION, SOLUTION INTRAVENOUS PRN
Status: DISCONTINUED | OUTPATIENT
Start: 2025-01-01 | End: 2025-01-01 | Stop reason: SDUPTHER

## 2025-01-01 RX ORDER — MORPHINE SULFATE 2 MG/ML
2 INJECTION, SOLUTION INTRAMUSCULAR; INTRAVENOUS
Status: DISCONTINUED | OUTPATIENT
Start: 2025-01-01 | End: 2025-01-01 | Stop reason: HOSPADM

## 2025-01-01 RX ORDER — GUAIFENESIN 400 MG/1
400 TABLET ORAL 3 TIMES DAILY
Qty: 56 TABLET | Refills: 0 | Status: SHIPPED | OUTPATIENT
Start: 2025-01-01 | End: 2025-01-01 | Stop reason: HOSPADM

## 2025-01-01 RX ORDER — SODIUM CHLORIDE 0.9 % (FLUSH) 0.9 %
5-40 SYRINGE (ML) INJECTION EVERY 12 HOURS SCHEDULED
Status: DISCONTINUED | OUTPATIENT
Start: 2025-01-01 | End: 2025-01-01

## 2025-01-01 RX ORDER — PANTOPRAZOLE SODIUM 40 MG/10ML
40 INJECTION, POWDER, LYOPHILIZED, FOR SOLUTION INTRAVENOUS DAILY
Status: DISCONTINUED | OUTPATIENT
Start: 2025-01-01 | End: 2025-01-01

## 2025-01-01 RX ORDER — LORAZEPAM 2 MG/ML
4 INJECTION INTRAMUSCULAR
Status: DISCONTINUED | OUTPATIENT
Start: 2025-01-01 | End: 2025-01-01

## 2025-01-01 RX ORDER — LEVETIRACETAM 500 MG/5ML
500 INJECTION, SOLUTION, CONCENTRATE INTRAVENOUS EVERY 12 HOURS
Status: DISCONTINUED | OUTPATIENT
Start: 2025-01-01 | End: 2025-01-01

## 2025-01-01 RX ORDER — CHLORHEXIDINE GLUCONATE ORAL RINSE 1.2 MG/ML
15 SOLUTION DENTAL 2 TIMES DAILY
Status: DISCONTINUED | OUTPATIENT
Start: 2025-01-01 | End: 2025-01-01

## 2025-01-01 RX ORDER — HEPARIN SODIUM 1000 [USP'U]/ML
4000 INJECTION, SOLUTION INTRAVENOUS; SUBCUTANEOUS PRN
Status: DISCONTINUED | OUTPATIENT
Start: 2025-01-01 | End: 2025-01-01

## 2025-01-01 RX ORDER — HEPARIN SODIUM 1000 [USP'U]/ML
4000 INJECTION, SOLUTION INTRAVENOUS; SUBCUTANEOUS ONCE
Status: COMPLETED | OUTPATIENT
Start: 2025-01-01 | End: 2025-01-01

## 2025-01-01 RX ORDER — WARFARIN SODIUM 1 MG/1
0.5 TABLET ORAL
Status: COMPLETED | OUTPATIENT
Start: 2025-01-01 | End: 2025-01-01

## 2025-01-01 RX ORDER — SODIUM CHLORIDE 0.9 % (FLUSH) 0.9 %
5-40 SYRINGE (ML) INJECTION PRN
Status: DISCONTINUED | OUTPATIENT
Start: 2025-01-01 | End: 2025-01-01

## 2025-01-01 RX ORDER — MECOBALAMIN 5000 MCG
10 TABLET,DISINTEGRATING ORAL NIGHTLY
Status: DISCONTINUED | OUTPATIENT
Start: 2025-01-01 | End: 2025-01-01 | Stop reason: HOSPADM

## 2025-01-01 RX ORDER — MAGNESIUM SULFATE IN WATER 40 MG/ML
2000 INJECTION, SOLUTION INTRAVENOUS ONCE
Status: COMPLETED | OUTPATIENT
Start: 2025-01-01 | End: 2025-01-01

## 2025-01-01 RX ORDER — FENTANYL CITRATE-0.9 % NACL/PF 10 MCG/ML
25-200 PLASTIC BAG, INJECTION (ML) INTRAVENOUS CONTINUOUS
Refills: 0 | Status: DISCONTINUED | OUTPATIENT
Start: 2025-01-01 | End: 2025-01-01

## 2025-01-01 RX ORDER — PROPOFOL 10 MG/ML
INJECTION, EMULSION INTRAVENOUS
Status: DISPENSED
Start: 2025-01-01 | End: 2025-01-01

## 2025-01-01 RX ORDER — ALBUTEROL SULFATE 0.83 MG/ML
2.5 SOLUTION RESPIRATORY (INHALATION) 2 TIMES DAILY
Qty: 120 EACH | Refills: 3 | Status: SHIPPED | OUTPATIENT
Start: 2025-01-01 | End: 2025-01-01 | Stop reason: HOSPADM

## 2025-01-01 RX ORDER — HEPARIN SODIUM 1000 [USP'U]/ML
2000 INJECTION, SOLUTION INTRAVENOUS; SUBCUTANEOUS PRN
Status: DISCONTINUED | OUTPATIENT
Start: 2025-01-01 | End: 2025-01-01

## 2025-01-01 RX ORDER — WARFARIN SODIUM 3 MG/1
1.5 TABLET ORAL DAILY
Status: DISCONTINUED | OUTPATIENT
Start: 2025-01-01 | End: 2025-01-01

## 2025-01-01 RX ORDER — WARFARIN SODIUM 3 MG/1
1.5 TABLET ORAL
Status: COMPLETED | OUTPATIENT
Start: 2025-01-01 | End: 2025-01-01

## 2025-01-01 RX ORDER — POTASSIUM CHLORIDE 7.45 MG/ML
10 INJECTION INTRAVENOUS
Status: COMPLETED | OUTPATIENT
Start: 2025-01-01 | End: 2025-01-01

## 2025-01-01 RX ORDER — LORAZEPAM 1 MG/1
1 TABLET ORAL
Status: DISCONTINUED | OUTPATIENT
Start: 2025-01-01 | End: 2025-01-01

## 2025-01-01 RX ORDER — WARFARIN SODIUM 1 MG/1
1 TABLET ORAL
Status: DISCONTINUED | OUTPATIENT
Start: 2025-01-01 | End: 2025-01-01

## 2025-01-01 RX ORDER — SUCCINYLCHOLINE CHLORIDE 20 MG/ML
INJECTION INTRAMUSCULAR; INTRAVENOUS
Status: DISCONTINUED
Start: 2025-01-01 | End: 2025-01-01 | Stop reason: WASHOUT

## 2025-01-01 RX ORDER — THIAMINE HYDROCHLORIDE 100 MG/ML
250 INJECTION, SOLUTION INTRAMUSCULAR; INTRAVENOUS DAILY
Status: COMPLETED | OUTPATIENT
Start: 2025-01-01 | End: 2025-01-01

## 2025-01-01 RX ORDER — MIRTAZAPINE 15 MG/1
7.5 TABLET, FILM COATED ORAL NIGHTLY
Status: DISCONTINUED | OUTPATIENT
Start: 2025-01-01 | End: 2025-01-01

## 2025-01-01 RX ORDER — ENOXAPARIN SODIUM 150 MG/ML
1 INJECTION SUBCUTANEOUS 2 TIMES DAILY
Qty: 20 EACH | Refills: 0 | Status: SHIPPED | OUTPATIENT
Start: 2025-01-01 | End: 2025-01-01 | Stop reason: HOSPADM

## 2025-01-01 RX ORDER — METOPROLOL TARTRATE 25 MG/1
12.5 TABLET, FILM COATED ORAL 2 TIMES DAILY
Qty: 60 TABLET | Refills: 3 | Status: SHIPPED | OUTPATIENT
Start: 2025-01-01 | End: 2025-01-01 | Stop reason: HOSPADM

## 2025-01-01 RX ORDER — MIDODRINE HYDROCHLORIDE 5 MG/1
5 TABLET ORAL
Status: DISCONTINUED | OUTPATIENT
Start: 2025-01-01 | End: 2025-01-01 | Stop reason: HOSPADM

## 2025-01-01 RX ORDER — SODIUM CHLORIDE 0.9 % (FLUSH) 0.9 %
5-40 SYRINGE (ML) INJECTION PRN
Status: DISCONTINUED | OUTPATIENT
Start: 2025-01-01 | End: 2025-01-01 | Stop reason: HOSPADM

## 2025-01-01 RX ORDER — HEPARIN SODIUM 10000 [USP'U]/100ML
5-30 INJECTION, SOLUTION INTRAVENOUS CONTINUOUS
Status: DISCONTINUED | OUTPATIENT
Start: 2025-01-01 | End: 2025-01-01

## 2025-01-01 RX ORDER — MULTIVITAMIN WITH IRON
1 TABLET ORAL DAILY
Qty: 30 TABLET | Refills: 0 | Status: SHIPPED | OUTPATIENT
Start: 2025-01-01 | End: 2025-01-01 | Stop reason: HOSPADM

## 2025-01-01 RX ORDER — SODIUM CHLORIDE 9 MG/ML
INJECTION, SOLUTION INTRAVENOUS CONTINUOUS
Status: DISCONTINUED | OUTPATIENT
Start: 2025-01-01 | End: 2025-01-01

## 2025-01-01 RX ORDER — BUMETANIDE 1 MG/1
2 TABLET ORAL DAILY
Status: DISCONTINUED | OUTPATIENT
Start: 2025-01-01 | End: 2025-01-01

## 2025-01-01 RX ORDER — LANOLIN ALCOHOL/MO/W.PET/CERES
100 CREAM (GRAM) TOPICAL DAILY
Status: DISCONTINUED | OUTPATIENT
Start: 2025-01-01 | End: 2025-01-01

## 2025-01-01 RX ORDER — ONDANSETRON 2 MG/ML
4 INJECTION INTRAMUSCULAR; INTRAVENOUS EVERY 6 HOURS PRN
Status: DISCONTINUED | OUTPATIENT
Start: 2025-01-01 | End: 2025-01-01 | Stop reason: HOSPADM

## 2025-01-01 RX ORDER — POTASSIUM CHLORIDE 1500 MG/1
40 TABLET, EXTENDED RELEASE ORAL PRN
Status: DISCONTINUED | OUTPATIENT
Start: 2025-01-01 | End: 2025-01-01

## 2025-01-01 RX ORDER — ENOXAPARIN SODIUM 150 MG/ML
1 INJECTION SUBCUTANEOUS 2 TIMES DAILY
Status: DISCONTINUED | OUTPATIENT
Start: 2025-01-01 | End: 2025-01-01 | Stop reason: HOSPADM

## 2025-01-01 RX ORDER — HEPARIN SODIUM 10000 [USP'U]/100ML
5-30 INJECTION, SOLUTION INTRAVENOUS CONTINUOUS
Status: DISCONTINUED | OUTPATIENT
Start: 2025-01-01 | End: 2025-01-01 | Stop reason: SDUPTHER

## 2025-01-01 RX ORDER — ACETAMINOPHEN 650 MG/1
650 SUPPOSITORY RECTAL EVERY 6 HOURS PRN
Status: DISCONTINUED | OUTPATIENT
Start: 2025-01-01 | End: 2025-01-01 | Stop reason: SDUPTHER

## 2025-01-01 RX ORDER — ACETAMINOPHEN 160 MG/5ML
650 LIQUID ORAL EVERY 6 HOURS PRN
Status: DISCONTINUED | OUTPATIENT
Start: 2025-01-01 | End: 2025-01-01 | Stop reason: HOSPADM

## 2025-01-01 RX ORDER — WARFARIN SODIUM 1 MG/1
1 TABLET ORAL DAILY
Status: DISCONTINUED | OUTPATIENT
Start: 2025-01-01 | End: 2025-01-01

## 2025-01-01 RX ORDER — ETOMIDATE 2 MG/ML
INJECTION INTRAVENOUS
Status: DISCONTINUED
Start: 2025-01-01 | End: 2025-01-01 | Stop reason: WASHOUT

## 2025-01-01 RX ORDER — FUROSEMIDE 10 MG/ML
40 INJECTION INTRAMUSCULAR; INTRAVENOUS ONCE
Status: COMPLETED | OUTPATIENT
Start: 2025-01-01 | End: 2025-01-01

## 2025-01-01 RX ORDER — POTASSIUM CHLORIDE 7.45 MG/ML
10 INJECTION INTRAVENOUS
Status: DISCONTINUED | OUTPATIENT
Start: 2025-01-01 | End: 2025-01-01

## 2025-01-01 RX ORDER — LORAZEPAM 2 MG/ML
1 INJECTION INTRAMUSCULAR
Status: DISCONTINUED | OUTPATIENT
Start: 2025-01-01 | End: 2025-01-01

## 2025-01-01 RX ORDER — BUMETANIDE 1 MG/1
1 TABLET ORAL DAILY
Status: DISCONTINUED | OUTPATIENT
Start: 2025-01-01 | End: 2025-01-01

## 2025-01-01 RX ORDER — LORAZEPAM 1 MG/1
3 TABLET ORAL
Status: DISCONTINUED | OUTPATIENT
Start: 2025-01-01 | End: 2025-01-01

## 2025-01-01 RX ORDER — LACTULOSE 10 G/15ML
20 SOLUTION ORAL 3 TIMES DAILY
Status: DISCONTINUED | OUTPATIENT
Start: 2025-01-01 | End: 2025-01-01 | Stop reason: HOSPADM

## 2025-01-01 RX ORDER — MULTIVITAMIN WITH IRON
1 TABLET ORAL DAILY
Status: DISCONTINUED | OUTPATIENT
Start: 2025-01-01 | End: 2025-01-01 | Stop reason: SDUPTHER

## 2025-01-01 RX ORDER — LACTULOSE 10 G/15ML
20 SOLUTION ORAL DAILY
Status: DISCONTINUED | OUTPATIENT
Start: 2025-01-01 | End: 2025-01-01

## 2025-01-01 RX ORDER — LORAZEPAM 2 MG/ML
1 INJECTION INTRAMUSCULAR ONCE
Status: COMPLETED | OUTPATIENT
Start: 2025-01-01 | End: 2025-01-01

## 2025-01-01 RX ORDER — LORAZEPAM 1 MG/1
4 TABLET ORAL
Status: DISCONTINUED | OUTPATIENT
Start: 2025-01-01 | End: 2025-01-01

## 2025-01-01 RX ORDER — POTASSIUM CHLORIDE 7.45 MG/ML
10 INJECTION INTRAVENOUS PRN
Status: DISCONTINUED | OUTPATIENT
Start: 2025-01-01 | End: 2025-01-01

## 2025-01-01 RX ORDER — WARFARIN SODIUM 2.5 MG/1
2.5 TABLET ORAL
Status: DISCONTINUED | OUTPATIENT
Start: 2025-01-01 | End: 2025-01-01 | Stop reason: HOSPADM

## 2025-01-01 RX ORDER — DEXAMETHASONE SODIUM PHOSPHATE 10 MG/ML
6 INJECTION, SOLUTION INTRAMUSCULAR; INTRAVENOUS EVERY 4 HOURS
Status: DISCONTINUED | OUTPATIENT
Start: 2025-01-01 | End: 2025-01-01

## 2025-01-01 RX ORDER — ONDANSETRON 4 MG/1
4 TABLET, ORALLY DISINTEGRATING ORAL EVERY 8 HOURS PRN
Status: DISCONTINUED | OUTPATIENT
Start: 2025-01-01 | End: 2025-01-01 | Stop reason: HOSPADM

## 2025-01-01 RX ORDER — ARFORMOTEROL TARTRATE 15 UG/2ML
15 SOLUTION RESPIRATORY (INHALATION)
Status: DISCONTINUED | OUTPATIENT
Start: 2025-01-01 | End: 2025-01-01 | Stop reason: HOSPADM

## 2025-01-01 RX ORDER — POLYETHYLENE GLYCOL 3350 17 G/17G
17 POWDER, FOR SOLUTION ORAL DAILY PRN
Status: DISCONTINUED | OUTPATIENT
Start: 2025-01-01 | End: 2025-01-01

## 2025-01-01 RX ORDER — BUMETANIDE 0.25 MG/ML
2 INJECTION, SOLUTION INTRAMUSCULAR; INTRAVENOUS 2 TIMES DAILY
Status: DISCONTINUED | OUTPATIENT
Start: 2025-01-01 | End: 2025-01-01

## 2025-01-01 RX ORDER — ONDANSETRON 2 MG/ML
4 INJECTION INTRAMUSCULAR; INTRAVENOUS EVERY 6 HOURS PRN
Status: DISCONTINUED | OUTPATIENT
Start: 2025-01-01 | End: 2025-01-01

## 2025-01-01 RX ORDER — LACOSAMIDE 50 MG/1
50 TABLET ORAL 2 TIMES DAILY
Qty: 60 TABLET | Refills: 0 | Status: SHIPPED | OUTPATIENT
Start: 2025-01-01 | End: 2025-01-01 | Stop reason: HOSPADM

## 2025-01-01 RX ORDER — HEPARIN SODIUM 1000 [USP'U]/ML
60 INJECTION, SOLUTION INTRAVENOUS; SUBCUTANEOUS PRN
Status: DISCONTINUED | OUTPATIENT
Start: 2025-01-01 | End: 2025-01-01 | Stop reason: SDUPTHER

## 2025-01-01 RX ORDER — DEXTROSE MONOHYDRATE 50 MG/ML
INJECTION, SOLUTION INTRAVENOUS CONTINUOUS
Status: DISCONTINUED | OUTPATIENT
Start: 2025-01-01 | End: 2025-01-01

## 2025-01-01 RX ORDER — NOREPINEPHRINE BITARTRATE 0.06 MG/ML
1-100 INJECTION, SOLUTION INTRAVENOUS CONTINUOUS
Status: DISCONTINUED | OUTPATIENT
Start: 2025-01-01 | End: 2025-01-01

## 2025-01-01 RX ORDER — TAMSULOSIN HYDROCHLORIDE 0.4 MG/1
0.4 CAPSULE ORAL NIGHTLY
Status: DISCONTINUED | OUTPATIENT
Start: 2025-01-01 | End: 2025-01-01

## 2025-01-01 RX ORDER — MAGNESIUM SULFATE 1 G/100ML
1000 INJECTION INTRAVENOUS ONCE
Status: COMPLETED | OUTPATIENT
Start: 2025-01-01 | End: 2025-01-01

## 2025-01-01 RX ORDER — VITAMIN B COMPLEX
1000 TABLET ORAL DAILY
Status: DISCONTINUED | OUTPATIENT
Start: 2025-01-01 | End: 2025-01-01 | Stop reason: HOSPADM

## 2025-01-01 RX ORDER — POTASSIUM CHLORIDE 7.45 MG/ML
10 INJECTION INTRAVENOUS PRN
Status: DISCONTINUED | OUTPATIENT
Start: 2025-01-01 | End: 2025-01-01 | Stop reason: SDUPTHER

## 2025-01-01 RX ORDER — ENOXAPARIN SODIUM 150 MG/ML
1 INJECTION SUBCUTANEOUS 2 TIMES DAILY
Status: DISCONTINUED | OUTPATIENT
Start: 2025-01-01 | End: 2025-01-01 | Stop reason: SDUPTHER

## 2025-01-01 RX ORDER — 0.9 % SODIUM CHLORIDE 0.9 %
500 INTRAVENOUS SOLUTION INTRAVENOUS ONCE
Status: COMPLETED | OUTPATIENT
Start: 2025-01-01 | End: 2025-01-01

## 2025-01-01 RX ORDER — BUMETANIDE 0.25 MG/ML
1 INJECTION, SOLUTION INTRAMUSCULAR; INTRAVENOUS 2 TIMES DAILY
Status: DISCONTINUED | OUTPATIENT
Start: 2025-01-01 | End: 2025-01-01 | Stop reason: HOSPADM

## 2025-01-01 RX ORDER — LORAZEPAM 2 MG/ML
2 INJECTION INTRAMUSCULAR
Status: DISCONTINUED | OUTPATIENT
Start: 2025-01-01 | End: 2025-01-01

## 2025-01-01 RX ORDER — IOPAMIDOL 755 MG/ML
75 INJECTION, SOLUTION INTRAVASCULAR
Status: DISCONTINUED | OUTPATIENT
Start: 2025-01-01 | End: 2025-01-01 | Stop reason: HOSPADM

## 2025-01-01 RX ORDER — BUSPIRONE HYDROCHLORIDE 5 MG/1
5 TABLET ORAL 2 TIMES DAILY
Status: DISCONTINUED | OUTPATIENT
Start: 2025-01-01 | End: 2025-01-01

## 2025-01-01 RX ORDER — GLYCOPYRROLATE 0.2 MG/ML
0.2 INJECTION INTRAMUSCULAR; INTRAVENOUS 4 TIMES DAILY
Status: DISCONTINUED | OUTPATIENT
Start: 2025-01-01 | End: 2025-01-01 | Stop reason: HOSPADM

## 2025-01-01 RX ORDER — THIAMINE HYDROCHLORIDE 100 MG/ML
250 INJECTION, SOLUTION INTRAMUSCULAR; INTRAVENOUS DAILY
Status: DISCONTINUED | OUTPATIENT
Start: 2025-01-01 | End: 2025-01-01 | Stop reason: SDUPTHER

## 2025-01-01 RX ORDER — WARFARIN SODIUM 5 MG/1
2.5 TABLET ORAL
Status: COMPLETED | OUTPATIENT
Start: 2025-01-01 | End: 2025-01-01

## 2025-01-01 RX ORDER — FUROSEMIDE 20 MG/1
20 TABLET ORAL 2 TIMES DAILY
Status: DISCONTINUED | OUTPATIENT
Start: 2025-01-01 | End: 2025-01-01

## 2025-01-01 RX ORDER — ACETYLCYSTEINE 200 MG/ML
600 SOLUTION ORAL; RESPIRATORY (INHALATION)
Status: DISCONTINUED | OUTPATIENT
Start: 2025-01-01 | End: 2025-01-01

## 2025-01-01 RX ORDER — MULTIVIT WITH IRON,MINERALS
15 LIQUID (ML) ORAL DAILY
Status: DISCONTINUED | OUTPATIENT
Start: 2025-01-01 | End: 2025-01-01 | Stop reason: HOSPADM

## 2025-01-01 RX ORDER — LANOLIN ALCOHOL/MO/W.PET/CERES
100 CREAM (GRAM) TOPICAL DAILY
Status: DISCONTINUED | OUTPATIENT
Start: 2025-01-01 | End: 2025-01-01 | Stop reason: HOSPADM

## 2025-01-01 RX ORDER — POTASSIUM CHLORIDE 1500 MG/1
40 TABLET, EXTENDED RELEASE ORAL PRN
Status: DISCONTINUED | OUTPATIENT
Start: 2025-01-01 | End: 2025-01-01 | Stop reason: SDUPTHER

## 2025-01-01 RX ORDER — ATORVASTATIN CALCIUM 20 MG/1
20 TABLET, FILM COATED ORAL NIGHTLY
Status: DISCONTINUED | OUTPATIENT
Start: 2025-01-01 | End: 2025-01-01 | Stop reason: HOSPADM

## 2025-01-01 RX ORDER — MECOBALAMIN 5000 MCG
10 TABLET,DISINTEGRATING ORAL NIGHTLY
Qty: 30 TABLET | Refills: 0 | Status: SHIPPED | OUTPATIENT
Start: 2025-01-01 | End: 2025-01-01 | Stop reason: HOSPADM

## 2025-01-01 RX ORDER — LORAZEPAM 2 MG/ML
3 INJECTION INTRAMUSCULAR
Status: DISCONTINUED | OUTPATIENT
Start: 2025-01-01 | End: 2025-01-01

## 2025-01-01 RX ORDER — HEPARIN SODIUM 1000 [USP'U]/ML
60 INJECTION, SOLUTION INTRAVENOUS; SUBCUTANEOUS ONCE
Status: DISCONTINUED | OUTPATIENT
Start: 2025-01-01 | End: 2025-01-01 | Stop reason: SDUPTHER

## 2025-01-01 RX ORDER — LEVETIRACETAM 500 MG/5ML
250 INJECTION, SOLUTION, CONCENTRATE INTRAVENOUS 2 TIMES DAILY
Status: DISCONTINUED | OUTPATIENT
Start: 2025-01-01 | End: 2025-01-01

## 2025-01-01 RX ORDER — GUAIFENESIN 200 MG/10ML
400 LIQUID ORAL 3 TIMES DAILY
Status: DISCONTINUED | OUTPATIENT
Start: 2025-01-01 | End: 2025-01-01 | Stop reason: HOSPADM

## 2025-01-01 RX ORDER — HEPARIN SODIUM 1000 [USP'U]/ML
30 INJECTION, SOLUTION INTRAVENOUS; SUBCUTANEOUS PRN
Status: DISCONTINUED | OUTPATIENT
Start: 2025-01-01 | End: 2025-01-01 | Stop reason: SDUPTHER

## 2025-01-01 RX ORDER — GLUCAGON 1 MG/ML
1 KIT INJECTION PRN
Status: DISCONTINUED | OUTPATIENT
Start: 2025-01-01 | End: 2025-01-01 | Stop reason: HOSPADM

## 2025-01-01 RX ORDER — FOLIC ACID 5 MG/ML
1 INJECTION, SOLUTION INTRAMUSCULAR; INTRAVENOUS; SUBCUTANEOUS DAILY
Status: DISCONTINUED | OUTPATIENT
Start: 2025-01-01 | End: 2025-01-01 | Stop reason: HOSPADM

## 2025-01-01 RX ORDER — BUDESONIDE 0.5 MG/2ML
0.5 INHALANT ORAL
Status: DISCONTINUED | OUTPATIENT
Start: 2025-01-01 | End: 2025-01-01 | Stop reason: HOSPADM

## 2025-01-01 RX ORDER — FLUMAZENIL 0.1 MG/ML
0.2 INJECTION INTRAVENOUS ONCE
Status: COMPLETED | OUTPATIENT
Start: 2025-01-01 | End: 2025-01-01

## 2025-01-01 RX ORDER — POLYETHYLENE GLYCOL 3350 17 G/17G
17 POWDER, FOR SOLUTION ORAL DAILY PRN
Status: DISCONTINUED | OUTPATIENT
Start: 2025-01-01 | End: 2025-01-01 | Stop reason: HOSPADM

## 2025-01-01 RX ORDER — ONDANSETRON 4 MG/1
4 TABLET, ORALLY DISINTEGRATING ORAL EVERY 8 HOURS PRN
Status: DISCONTINUED | OUTPATIENT
Start: 2025-01-01 | End: 2025-01-01

## 2025-01-01 RX ORDER — ALBUMIN (HUMAN) 12.5 G/50ML
25 SOLUTION INTRAVENOUS EVERY 8 HOURS
Status: COMPLETED | OUTPATIENT
Start: 2025-01-01 | End: 2025-01-01

## 2025-01-01 RX ORDER — ACETAMINOPHEN 650 MG/1
650 SUPPOSITORY RECTAL EVERY 6 HOURS PRN
Status: DISCONTINUED | OUTPATIENT
Start: 2025-01-01 | End: 2025-01-01 | Stop reason: HOSPADM

## 2025-01-01 RX ORDER — LORAZEPAM 1 MG/1
2 TABLET ORAL
Status: DISCONTINUED | OUTPATIENT
Start: 2025-01-01 | End: 2025-01-01

## 2025-01-01 RX ORDER — AMLODIPINE BESYLATE 5 MG/1
5 TABLET ORAL DAILY
Status: DISCONTINUED | OUTPATIENT
Start: 2025-01-01 | End: 2025-01-01

## 2025-01-01 RX ADMIN — IPRATROPIUM BROMIDE AND ALBUTEROL SULFATE 1 DOSE: 2.5; .5 SOLUTION RESPIRATORY (INHALATION) at 11:46

## 2025-01-01 RX ADMIN — IPRATROPIUM BROMIDE AND ALBUTEROL SULFATE 1 DOSE: 2.5; .5 SOLUTION RESPIRATORY (INHALATION) at 08:13

## 2025-01-01 RX ADMIN — FUROSEMIDE 20 MG: 20 TABLET ORAL at 08:45

## 2025-01-01 RX ADMIN — LACTULOSE 20 G: 10 SOLUTION ORAL at 22:05

## 2025-01-01 RX ADMIN — ALBUMIN (HUMAN) 25 G: 0.25 INJECTION, SOLUTION INTRAVENOUS at 11:25

## 2025-01-01 RX ADMIN — Medication 4 ML: at 08:13

## 2025-01-01 RX ADMIN — ARFORMOTEROL TARTRATE 15 MCG: 15 SOLUTION RESPIRATORY (INHALATION) at 09:53

## 2025-01-01 RX ADMIN — MULTIVITAMIN TABLET 1 TABLET: TABLET at 08:53

## 2025-01-01 RX ADMIN — LACTULOSE 20 G: 10 SOLUTION ORAL at 20:07

## 2025-01-01 RX ADMIN — MIDAZOLAM HYDROCHLORIDE 2 MG: 2 INJECTION, SOLUTION INTRAMUSCULAR; INTRAVENOUS at 15:05

## 2025-01-01 RX ADMIN — MIDODRINE HYDROCHLORIDE 5 MG: 5 TABLET ORAL at 08:26

## 2025-01-01 RX ADMIN — MICAFUNGIN SODIUM 100 MG: 100 INJECTION, POWDER, LYOPHILIZED, FOR SOLUTION INTRAVENOUS at 09:13

## 2025-01-01 RX ADMIN — LORAZEPAM 2 MG: 2 INJECTION INTRAMUSCULAR; INTRAVENOUS at 01:38

## 2025-01-01 RX ADMIN — RIFAXIMIN 550 MG: 550 TABLET ORAL at 08:35

## 2025-01-01 RX ADMIN — WATER 50 MG: 1 INJECTION INTRAMUSCULAR; INTRAVENOUS; SUBCUTANEOUS at 09:05

## 2025-01-01 RX ADMIN — ATORVASTATIN CALCIUM 20 MG: 20 TABLET, FILM COATED ORAL at 19:42

## 2025-01-01 RX ADMIN — SODIUM CHLORIDE 80 MG: 9 INJECTION INTRAMUSCULAR; INTRAVENOUS; SUBCUTANEOUS at 14:06

## 2025-01-01 RX ADMIN — ARFORMOTEROL TARTRATE 15 MCG: 15 SOLUTION RESPIRATORY (INHALATION) at 08:49

## 2025-01-01 RX ADMIN — PETROLATUM: 420 OINTMENT TOPICAL at 09:34

## 2025-01-01 RX ADMIN — SODIUM CHLORIDE, PRESERVATIVE FREE 10 ML: 5 INJECTION INTRAVENOUS at 21:11

## 2025-01-01 RX ADMIN — DEXTROSE: 5 SOLUTION INTRAVENOUS at 22:02

## 2025-01-01 RX ADMIN — PETROLATUM: 420 OINTMENT TOPICAL at 08:25

## 2025-01-01 RX ADMIN — MULTIVITAMIN TABLET 1 TABLET: TABLET at 07:57

## 2025-01-01 RX ADMIN — MAGNESIUM SULFATE HEPTAHYDRATE 2000 MG: 40 INJECTION, SOLUTION INTRAVENOUS at 06:52

## 2025-01-01 RX ADMIN — CHLORHEXIDINE GLUCONATE, 0.12% ORAL RINSE 15 ML: 1.2 SOLUTION DENTAL at 20:15

## 2025-01-01 RX ADMIN — IPRATROPIUM BROMIDE AND ALBUTEROL SULFATE 1 DOSE: 2.5; .5 SOLUTION RESPIRATORY (INHALATION) at 20:35

## 2025-01-01 RX ADMIN — IPRATROPIUM BROMIDE AND ALBUTEROL SULFATE 1 DOSE: 2.5; .5 SOLUTION RESPIRATORY (INHALATION) at 12:40

## 2025-01-01 RX ADMIN — FOLIC ACID 1 MG: 1 TABLET ORAL at 08:27

## 2025-01-01 RX ADMIN — ARFORMOTEROL TARTRATE 15 MCG: 15 SOLUTION RESPIRATORY (INHALATION) at 09:23

## 2025-01-01 RX ADMIN — SODIUM CHLORIDE, PRESERVATIVE FREE 10 ML: 5 INJECTION INTRAVENOUS at 08:34

## 2025-01-01 RX ADMIN — ARFORMOTEROL TARTRATE 15 MCG: 15 SOLUTION RESPIRATORY (INHALATION) at 09:04

## 2025-01-01 RX ADMIN — ACETYLCYSTEINE 600 MG: 200 INHALANT RESPIRATORY (INHALATION) at 20:21

## 2025-01-01 RX ADMIN — IPRATROPIUM BROMIDE AND ALBUTEROL SULFATE 1 DOSE: 2.5; .5 SOLUTION RESPIRATORY (INHALATION) at 20:36

## 2025-01-01 RX ADMIN — POTASSIUM CHLORIDE 40 MEQ: 29.8 INJECTION, SOLUTION INTRAVENOUS at 18:04

## 2025-01-01 RX ADMIN — BUMETANIDE 0.5 MG/HR: 0.25 INJECTION INTRAMUSCULAR; INTRAVENOUS at 23:51

## 2025-01-01 RX ADMIN — MULTIVITAMIN TABLET 1 TABLET: TABLET at 08:18

## 2025-01-01 RX ADMIN — SODIUM CHLORIDE, PRESERVATIVE FREE 10 ML: 5 INJECTION INTRAVENOUS at 08:07

## 2025-01-01 RX ADMIN — WARFARIN SODIUM 0.5 MG: 1 TABLET ORAL at 18:11

## 2025-01-01 RX ADMIN — LACTULOSE 20 G: 10 SOLUTION ORAL at 21:08

## 2025-01-01 RX ADMIN — Medication 75 MCG/HR: at 11:56

## 2025-01-01 RX ADMIN — SODIUM CHLORIDE, PRESERVATIVE FREE 10 ML: 5 INJECTION INTRAVENOUS at 20:42

## 2025-01-01 RX ADMIN — LEVETIRACETAM 500 MG: 100 INJECTION INTRAVENOUS at 23:03

## 2025-01-01 RX ADMIN — BUDESONIDE 500 MCG: 0.5 INHALANT RESPIRATORY (INHALATION) at 20:21

## 2025-01-01 RX ADMIN — ATORVASTATIN CALCIUM 20 MG: 20 TABLET, FILM COATED ORAL at 21:52

## 2025-01-01 RX ADMIN — CHLORHEXIDINE GLUCONATE, 0.12% ORAL RINSE 15 ML: 1.2 SOLUTION DENTAL at 20:33

## 2025-01-01 RX ADMIN — ARFORMOTEROL TARTRATE 15 MCG: 15 SOLUTION RESPIRATORY (INHALATION) at 08:37

## 2025-01-01 RX ADMIN — DEXMEDETOMIDINE 0.4 MCG/KG/HR: 100 INJECTION, SOLUTION INTRAVENOUS at 09:03

## 2025-01-01 RX ADMIN — BUSPIRONE HYDROCHLORIDE 5 MG: 5 TABLET ORAL at 08:07

## 2025-01-01 RX ADMIN — BUMETANIDE 0.2 MG/HR: 0.25 INJECTION INTRAMUSCULAR; INTRAVENOUS at 14:00

## 2025-01-01 RX ADMIN — ATORVASTATIN CALCIUM 20 MG: 20 TABLET, FILM COATED ORAL at 21:36

## 2025-01-01 RX ADMIN — SODIUM CHLORIDE, PRESERVATIVE FREE 10 ML: 5 INJECTION INTRAVENOUS at 09:44

## 2025-01-01 RX ADMIN — VANCOMYCIN HYDROCHLORIDE 2000 MG: 10 INJECTION, POWDER, LYOPHILIZED, FOR SOLUTION INTRAVENOUS at 15:59

## 2025-01-01 RX ADMIN — MIDODRINE HYDROCHLORIDE 15 MG: 10 TABLET ORAL at 07:57

## 2025-01-01 RX ADMIN — BUDESONIDE 500 MCG: 0.5 INHALANT RESPIRATORY (INHALATION) at 19:52

## 2025-01-01 RX ADMIN — SODIUM CHLORIDE, PRESERVATIVE FREE 10 ML: 5 INJECTION INTRAVENOUS at 10:26

## 2025-01-01 RX ADMIN — DOXYCYCLINE 100 MG: 100 INJECTION, POWDER, LYOPHILIZED, FOR SOLUTION INTRAVENOUS at 15:39

## 2025-01-01 RX ADMIN — PETROLATUM: 420 OINTMENT TOPICAL at 21:41

## 2025-01-01 RX ADMIN — RIFAXIMIN 550 MG: 550 TABLET ORAL at 20:59

## 2025-01-01 RX ADMIN — PETROLATUM: 420 OINTMENT TOPICAL at 21:09

## 2025-01-01 RX ADMIN — CHLORHEXIDINE GLUCONATE, 0.12% ORAL RINSE 15 ML: 1.2 SOLUTION DENTAL at 21:26

## 2025-01-01 RX ADMIN — FUROSEMIDE 20 MG: 20 TABLET ORAL at 17:53

## 2025-01-01 RX ADMIN — ACETYLCYSTEINE 600 MG: 200 INHALANT RESPIRATORY (INHALATION) at 09:03

## 2025-01-01 RX ADMIN — FUROSEMIDE 40 MG: 10 INJECTION, SOLUTION INTRAMUSCULAR; INTRAVENOUS at 23:23

## 2025-01-01 RX ADMIN — SODIUM CHLORIDE, PRESERVATIVE FREE 10 ML: 5 INJECTION INTRAVENOUS at 21:13

## 2025-01-01 RX ADMIN — LACTULOSE 20 G: 10 SOLUTION ORAL at 15:08

## 2025-01-01 RX ADMIN — BUSPIRONE HYDROCHLORIDE 5 MG: 5 TABLET ORAL at 20:41

## 2025-01-01 RX ADMIN — PETROLATUM: 420 OINTMENT TOPICAL at 08:54

## 2025-01-01 RX ADMIN — HYDROCORTISONE SODIUM SUCCINATE 100 MG: 100 INJECTION INTRAMUSCULAR; INTRAVENOUS at 13:15

## 2025-01-01 RX ADMIN — METOPROLOL TARTRATE 12.5 MG: 25 TABLET, FILM COATED ORAL at 20:59

## 2025-01-01 RX ADMIN — CHLORHEXIDINE GLUCONATE, 0.12% ORAL RINSE 15 ML: 1.2 SOLUTION DENTAL at 19:35

## 2025-01-01 RX ADMIN — LEVETIRACETAM 250 MG: 100 INJECTION INTRAVENOUS at 21:05

## 2025-01-01 RX ADMIN — LEVETIRACETAM 250 MG: 100 INJECTION INTRAVENOUS at 20:51

## 2025-01-01 RX ADMIN — LACTULOSE 20 G: 10 SOLUTION ORAL at 08:44

## 2025-01-01 RX ADMIN — RIFAXIMIN 400 MG: 200 TABLET ORAL at 08:45

## 2025-01-01 RX ADMIN — PANTOPRAZOLE SODIUM 40 MG: 40 INJECTION, POWDER, FOR SOLUTION INTRAVENOUS at 21:13

## 2025-01-01 RX ADMIN — LACTULOSE 20 G: 10 SOLUTION ORAL at 21:13

## 2025-01-01 RX ADMIN — THIAMINE HYDROCHLORIDE 500 MG: 100 INJECTION, SOLUTION INTRAMUSCULAR; INTRAVENOUS at 09:01

## 2025-01-01 RX ADMIN — MIDODRINE HYDROCHLORIDE 5 MG: 5 TABLET ORAL at 11:27

## 2025-01-01 RX ADMIN — LACTULOSE 20 G: 10 SOLUTION ORAL at 08:18

## 2025-01-01 RX ADMIN — ARFORMOTEROL TARTRATE 15 MCG: 15 SOLUTION RESPIRATORY (INHALATION) at 20:21

## 2025-01-01 RX ADMIN — POTASSIUM CHLORIDE 40 MEQ: 29.8 INJECTION, SOLUTION INTRAVENOUS at 22:15

## 2025-01-01 RX ADMIN — PANTOPRAZOLE SODIUM 40 MG: 40 INJECTION, POWDER, FOR SOLUTION INTRAVENOUS at 10:14

## 2025-01-01 RX ADMIN — CHLORHEXIDINE GLUCONATE, 0.12% ORAL RINSE 15 ML: 1.2 SOLUTION DENTAL at 09:39

## 2025-01-01 RX ADMIN — ATORVASTATIN CALCIUM 20 MG: 20 TABLET, FILM COATED ORAL at 21:21

## 2025-01-01 RX ADMIN — RIFAXIMIN 400 MG: 200 TABLET ORAL at 20:47

## 2025-01-01 RX ADMIN — HYDROCORTISONE SODIUM SUCCINATE 100 MG: 100 INJECTION INTRAMUSCULAR; INTRAVENOUS at 04:48

## 2025-01-01 RX ADMIN — LEVETIRACETAM 500 MG: 100 INJECTION INTRAVENOUS at 12:22

## 2025-01-01 RX ADMIN — PANTOPRAZOLE SODIUM 40 MG: 40 INJECTION, POWDER, FOR SOLUTION INTRAVENOUS at 08:48

## 2025-01-01 RX ADMIN — ENOXAPARIN SODIUM 120 MG: 150 INJECTION SUBCUTANEOUS at 12:31

## 2025-01-01 RX ADMIN — CHLORHEXIDINE GLUCONATE, 0.12% ORAL RINSE 15 ML: 1.2 SOLUTION DENTAL at 08:06

## 2025-01-01 RX ADMIN — IPRATROPIUM BROMIDE AND ALBUTEROL SULFATE 1 DOSE: 2.5; .5 SOLUTION RESPIRATORY (INHALATION) at 08:39

## 2025-01-01 RX ADMIN — THIAMINE HYDROCHLORIDE 100 MG: 100 INJECTION, SOLUTION INTRAMUSCULAR; INTRAVENOUS at 08:40

## 2025-01-01 RX ADMIN — LEVETIRACETAM 250 MG: 100 INJECTION INTRAVENOUS at 22:05

## 2025-01-01 RX ADMIN — BUDESONIDE 500 MCG: 0.5 INHALANT RESPIRATORY (INHALATION) at 08:37

## 2025-01-01 RX ADMIN — DEXTROSE MONOHYDRATE: 50 INJECTION, SOLUTION INTRAVENOUS at 23:17

## 2025-01-01 RX ADMIN — THIAMINE HYDROCHLORIDE 100 MG: 100 INJECTION, SOLUTION INTRAMUSCULAR; INTRAVENOUS at 10:13

## 2025-01-01 RX ADMIN — PETROLATUM: 420 OINTMENT TOPICAL at 10:28

## 2025-01-01 RX ADMIN — LACTULOSE 20 G: 10 SOLUTION ORAL at 20:45

## 2025-01-01 RX ADMIN — FUROSEMIDE 20 MG: 20 TABLET ORAL at 17:34

## 2025-01-01 RX ADMIN — MULTIVITAMIN TABLET 1 TABLET: TABLET at 09:42

## 2025-01-01 RX ADMIN — BUSPIRONE HYDROCHLORIDE 5 MG: 5 TABLET ORAL at 20:07

## 2025-01-01 RX ADMIN — SODIUM CHLORIDE 125 MG: 9 INJECTION, SOLUTION INTRAVENOUS at 10:51

## 2025-01-01 RX ADMIN — MICAFUNGIN SODIUM 100 MG: 100 INJECTION, POWDER, LYOPHILIZED, FOR SOLUTION INTRAVENOUS at 07:48

## 2025-01-01 RX ADMIN — POTASSIUM CHLORIDE 40 MEQ: 29.8 INJECTION, SOLUTION INTRAVENOUS at 05:58

## 2025-01-01 RX ADMIN — METOPROLOL TARTRATE 12.5 MG: 25 TABLET, FILM COATED ORAL at 08:47

## 2025-01-01 RX ADMIN — PANTOPRAZOLE SODIUM 40 MG: 40 INJECTION, POWDER, FOR SOLUTION INTRAVENOUS at 21:17

## 2025-01-01 RX ADMIN — MIDODRINE HYDROCHLORIDE 5 MG: 5 TABLET ORAL at 08:45

## 2025-01-01 RX ADMIN — RIFAXIMIN 400 MG: 200 TABLET ORAL at 19:42

## 2025-01-01 RX ADMIN — LEVETIRACETAM 250 MG: 100 INJECTION INTRAVENOUS at 07:44

## 2025-01-01 RX ADMIN — PHYTONADIONE 10 MG: 10 INJECTION, EMULSION INTRAMUSCULAR; INTRAVENOUS; SUBCUTANEOUS at 17:32

## 2025-01-01 RX ADMIN — PANTOPRAZOLE SODIUM 40 MG: 40 INJECTION, POWDER, FOR SOLUTION INTRAVENOUS at 20:05

## 2025-01-01 RX ADMIN — ARFORMOTEROL TARTRATE 15 MCG: 15 SOLUTION RESPIRATORY (INHALATION) at 08:58

## 2025-01-01 RX ADMIN — PETROLATUM: 420 OINTMENT TOPICAL at 09:40

## 2025-01-01 RX ADMIN — HYDROCORTISONE SODIUM SUCCINATE 100 MG: 100 INJECTION INTRAMUSCULAR; INTRAVENOUS at 12:04

## 2025-01-01 RX ADMIN — MIDODRINE HYDROCHLORIDE 5 MG: 5 TABLET ORAL at 16:28

## 2025-01-01 RX ADMIN — WARFARIN SODIUM 1 MG: 1 TABLET ORAL at 17:45

## 2025-01-01 RX ADMIN — MEROPENEM 2000 MG: 2 INJECTION, POWDER, FOR SOLUTION INTRAVENOUS at 18:52

## 2025-01-01 RX ADMIN — HEPARIN SODIUM 2000 UNITS: 1000 INJECTION INTRAVENOUS; SUBCUTANEOUS at 16:38

## 2025-01-01 RX ADMIN — IPRATROPIUM BROMIDE AND ALBUTEROL SULFATE 1 DOSE: 2.5; .5 SOLUTION RESPIRATORY (INHALATION) at 21:10

## 2025-01-01 RX ADMIN — PANTOPRAZOLE SODIUM 40 MG: 40 INJECTION, POWDER, FOR SOLUTION INTRAVENOUS at 08:45

## 2025-01-01 RX ADMIN — THIAMINE HYDROCHLORIDE 500 MG: 100 INJECTION, SOLUTION INTRAMUSCULAR; INTRAVENOUS at 00:05

## 2025-01-01 RX ADMIN — BUDESONIDE 500 MCG: 0.5 INHALANT RESPIRATORY (INHALATION) at 08:34

## 2025-01-01 RX ADMIN — DEXTROSE: 5 SOLUTION INTRAVENOUS at 01:34

## 2025-01-01 RX ADMIN — LACTULOSE 20 G: 10 SOLUTION ORAL at 19:42

## 2025-01-01 RX ADMIN — BUSPIRONE HYDROCHLORIDE 5 MG: 5 TABLET ORAL at 08:45

## 2025-01-01 RX ADMIN — SODIUM CHLORIDE, PRESERVATIVE FREE 10 ML: 5 INJECTION INTRAVENOUS at 21:36

## 2025-01-01 RX ADMIN — CEFEPIME 2000 MG: 2 INJECTION, POWDER, FOR SOLUTION INTRAVENOUS at 03:51

## 2025-01-01 RX ADMIN — BUDESONIDE 500 MCG: 0.5 INHALANT RESPIRATORY (INHALATION) at 09:54

## 2025-01-01 RX ADMIN — HYDROCORTISONE SODIUM SUCCINATE 100 MG: 100 INJECTION INTRAMUSCULAR; INTRAVENOUS at 20:26

## 2025-01-01 RX ADMIN — LEVETIRACETAM 250 MG: 100 INJECTION INTRAVENOUS at 08:40

## 2025-01-01 RX ADMIN — Medication 200 MCG/HR: at 18:05

## 2025-01-01 RX ADMIN — RIFAXIMIN 400 MG: 200 TABLET ORAL at 08:53

## 2025-01-01 RX ADMIN — BUMETANIDE 2 MG: 0.25 INJECTION INTRAMUSCULAR; INTRAVENOUS at 18:41

## 2025-01-01 RX ADMIN — PANTOPRAZOLE SODIUM 40 MG: 40 INJECTION, POWDER, FOR SOLUTION INTRAVENOUS at 08:18

## 2025-01-01 RX ADMIN — HEPARIN SODIUM 8.7 UNITS/KG/HR: 10000 INJECTION, SOLUTION INTRAVENOUS at 11:43

## 2025-01-01 RX ADMIN — METOPROLOL TARTRATE 12.5 MG: 25 TABLET, FILM COATED ORAL at 09:10

## 2025-01-01 RX ADMIN — SODIUM CHLORIDE, PRESERVATIVE FREE 10 ML: 5 INJECTION INTRAVENOUS at 21:52

## 2025-01-01 RX ADMIN — Medication 200 MCG/HR: at 08:10

## 2025-01-01 RX ADMIN — SODIUM ZIRCONIUM CYCLOSILICATE 10 G: 10 POWDER, FOR SUSPENSION ORAL at 09:43

## 2025-01-01 RX ADMIN — MICAFUNGIN SODIUM 100 MG: 100 INJECTION, POWDER, LYOPHILIZED, FOR SOLUTION INTRAVENOUS at 08:27

## 2025-01-01 RX ADMIN — DEXTROSE MONOHYDRATE: 50 INJECTION, SOLUTION INTRAVENOUS at 02:29

## 2025-01-01 RX ADMIN — LEVETIRACETAM 250 MG: 100 INJECTION INTRAVENOUS at 09:39

## 2025-01-01 RX ADMIN — IPRATROPIUM BROMIDE AND ALBUTEROL SULFATE 1 DOSE: 2.5; .5 SOLUTION RESPIRATORY (INHALATION) at 16:08

## 2025-01-01 RX ADMIN — BUMETANIDE 1 MG: 0.25 INJECTION INTRAMUSCULAR; INTRAVENOUS at 10:45

## 2025-01-01 RX ADMIN — METOPROLOL TARTRATE 12.5 MG: 25 TABLET, FILM COATED ORAL at 22:06

## 2025-01-01 RX ADMIN — MIDODRINE HYDROCHLORIDE 5 MG: 5 TABLET ORAL at 18:10

## 2025-01-01 RX ADMIN — RIFAXIMIN 400 MG: 200 TABLET ORAL at 14:17

## 2025-01-01 RX ADMIN — BUDESONIDE 500 MCG: 0.5 INHALANT RESPIRATORY (INHALATION) at 08:33

## 2025-01-01 RX ADMIN — ACETYLCYSTEINE 600 MG: 200 INHALANT RESPIRATORY (INHALATION) at 20:43

## 2025-01-01 RX ADMIN — MICAFUNGIN SODIUM 100 MG: 100 INJECTION, POWDER, LYOPHILIZED, FOR SOLUTION INTRAVENOUS at 12:46

## 2025-01-01 RX ADMIN — CHLORHEXIDINE GLUCONATE, 0.12% ORAL RINSE 15 ML: 1.2 SOLUTION DENTAL at 07:44

## 2025-01-01 RX ADMIN — MIDODRINE HYDROCHLORIDE 5 MG: 5 TABLET ORAL at 17:33

## 2025-01-01 RX ADMIN — LEVETIRACETAM 500 MG: 100 INJECTION INTRAVENOUS at 09:41

## 2025-01-01 RX ADMIN — FOLIC ACID 1 MG: 5 INJECTION, SOLUTION INTRAMUSCULAR; INTRAVENOUS; SUBCUTANEOUS at 10:22

## 2025-01-01 RX ADMIN — FOLIC ACID 1 MG: 5 INJECTION, SOLUTION INTRAMUSCULAR; INTRAVENOUS; SUBCUTANEOUS at 09:04

## 2025-01-01 RX ADMIN — Medication 1000 UNITS: at 17:46

## 2025-01-01 RX ADMIN — METOCLOPRAMIDE 5 MG: 5 INJECTION, SOLUTION INTRAMUSCULAR; INTRAVENOUS at 08:30

## 2025-01-01 RX ADMIN — BUDESONIDE 500 MCG: 0.5 INHALANT RESPIRATORY (INHALATION) at 09:53

## 2025-01-01 RX ADMIN — CHLORHEXIDINE GLUCONATE, 0.12% ORAL RINSE 15 ML: 1.2 SOLUTION DENTAL at 07:57

## 2025-01-01 RX ADMIN — RIFAXIMIN 400 MG: 200 TABLET ORAL at 19:59

## 2025-01-01 RX ADMIN — AZTREONAM 1000 MG: 1 INJECTION, POWDER, LYOPHILIZED, FOR SOLUTION INTRAMUSCULAR; INTRAVENOUS at 03:45

## 2025-01-01 RX ADMIN — SODIUM CHLORIDE, PRESERVATIVE FREE 10 ML: 5 INJECTION INTRAVENOUS at 10:20

## 2025-01-01 RX ADMIN — IPRATROPIUM BROMIDE AND ALBUTEROL SULFATE 1 DOSE: 2.5; .5 SOLUTION RESPIRATORY (INHALATION) at 09:03

## 2025-01-01 RX ADMIN — RIFAXIMIN 550 MG: 550 TABLET ORAL at 10:27

## 2025-01-01 RX ADMIN — RIFAXIMIN 400 MG: 200 TABLET ORAL at 21:16

## 2025-01-01 RX ADMIN — POTASSIUM CHLORIDE 20 MEQ: 29.8 INJECTION, SOLUTION INTRAVENOUS at 06:27

## 2025-01-01 RX ADMIN — ARFORMOTEROL TARTRATE 15 MCG: 15 SOLUTION RESPIRATORY (INHALATION) at 20:22

## 2025-01-01 RX ADMIN — FOLIC ACID 1 MG: 5 INJECTION, SOLUTION INTRAMUSCULAR; INTRAVENOUS; SUBCUTANEOUS at 09:10

## 2025-01-01 RX ADMIN — IPRATROPIUM BROMIDE AND ALBUTEROL SULFATE 1 DOSE: 2.5; .5 SOLUTION RESPIRATORY (INHALATION) at 20:31

## 2025-01-01 RX ADMIN — ACETYLCYSTEINE 600 MG: 200 INHALANT RESPIRATORY (INHALATION) at 20:35

## 2025-01-01 RX ADMIN — SODIUM CHLORIDE, PRESERVATIVE FREE 10 ML: 5 INJECTION INTRAVENOUS at 20:01

## 2025-01-01 RX ADMIN — RIFAXIMIN 400 MG: 200 TABLET ORAL at 15:55

## 2025-01-01 RX ADMIN — MULTIVITAMIN TABLET 1 TABLET: TABLET at 09:10

## 2025-01-01 RX ADMIN — SODIUM CHLORIDE, PRESERVATIVE FREE 10 ML: 5 INJECTION INTRAVENOUS at 08:37

## 2025-01-01 RX ADMIN — BUDESONIDE 500 MCG: 0.5 INHALANT RESPIRATORY (INHALATION) at 09:23

## 2025-01-01 RX ADMIN — RIFAXIMIN 400 MG: 200 TABLET ORAL at 16:05

## 2025-01-01 RX ADMIN — ACETYLCYSTEINE 600 MG: 200 INHALANT RESPIRATORY (INHALATION) at 21:05

## 2025-01-01 RX ADMIN — PYRIDOXINE HCL TAB 50 MG 100 MG: 50 TAB at 09:42

## 2025-01-01 RX ADMIN — PANTOPRAZOLE SODIUM 40 MG: 40 INJECTION, POWDER, FOR SOLUTION INTRAVENOUS at 21:00

## 2025-01-01 RX ADMIN — Medication 125 MCG/HR: at 05:22

## 2025-01-01 RX ADMIN — PETROLATUM: 420 OINTMENT TOPICAL at 20:33

## 2025-01-01 RX ADMIN — BUSPIRONE HYDROCHLORIDE 5 MG: 5 TABLET ORAL at 21:27

## 2025-01-01 RX ADMIN — CHLORHEXIDINE GLUCONATE, 0.12% ORAL RINSE 15 ML: 1.2 SOLUTION DENTAL at 09:42

## 2025-01-01 RX ADMIN — PANTOPRAZOLE SODIUM 40 MG: 40 INJECTION, POWDER, FOR SOLUTION INTRAVENOUS at 09:05

## 2025-01-01 RX ADMIN — BUSPIRONE HYDROCHLORIDE 5 MG: 5 TABLET ORAL at 10:13

## 2025-01-01 RX ADMIN — WARFARIN SODIUM 0.5 MG: 1 TABLET ORAL at 18:09

## 2025-01-01 RX ADMIN — RIFAXIMIN 400 MG: 200 TABLET ORAL at 08:06

## 2025-01-01 RX ADMIN — LACTULOSE 20 G: 10 SOLUTION ORAL at 14:37

## 2025-01-01 RX ADMIN — BUMETANIDE 1 MG/HR: 0.25 INJECTION INTRAMUSCULAR; INTRAVENOUS at 03:02

## 2025-01-01 RX ADMIN — BUDESONIDE 500 MCG: 0.5 INHALANT RESPIRATORY (INHALATION) at 08:54

## 2025-01-01 RX ADMIN — MIDODRINE HYDROCHLORIDE 5 MG: 5 TABLET ORAL at 12:50

## 2025-01-01 RX ADMIN — ATORVASTATIN CALCIUM 20 MG: 20 TABLET, FILM COATED ORAL at 21:03

## 2025-01-01 RX ADMIN — MIDODRINE HYDROCHLORIDE 15 MG: 10 TABLET ORAL at 12:05

## 2025-01-01 RX ADMIN — PANTOPRAZOLE SODIUM 40 MG: 40 INJECTION, POWDER, FOR SOLUTION INTRAVENOUS at 21:12

## 2025-01-01 RX ADMIN — THIAMINE HYDROCHLORIDE 100 MG: 100 INJECTION, SOLUTION INTRAMUSCULAR; INTRAVENOUS at 09:05

## 2025-01-01 RX ADMIN — MEROPENEM 2000 MG: 2 INJECTION, POWDER, FOR SOLUTION INTRAVENOUS at 08:42

## 2025-01-01 RX ADMIN — LEVETIRACETAM 500 MG: 100 INJECTION INTRAVENOUS at 11:27

## 2025-01-01 RX ADMIN — MULTIVITAMIN TABLET 1 TABLET: TABLET at 08:07

## 2025-01-01 RX ADMIN — THIAMINE HYDROCHLORIDE 250 MG: 100 INJECTION, SOLUTION INTRAMUSCULAR; INTRAVENOUS at 08:07

## 2025-01-01 RX ADMIN — HYDROCORTISONE SODIUM SUCCINATE 100 MG: 100 INJECTION INTRAMUSCULAR; INTRAVENOUS at 19:39

## 2025-01-01 RX ADMIN — ETOMIDATE 20 MG: 20 INJECTION, SOLUTION INTRAVENOUS at 15:10

## 2025-01-01 RX ADMIN — BUDESONIDE 500 MCG: 0.5 INHALANT RESPIRATORY (INHALATION) at 08:50

## 2025-01-01 RX ADMIN — HEPARIN SODIUM 12 UNITS/KG/HR: 10000 INJECTION, SOLUTION INTRAVENOUS at 21:49

## 2025-01-01 RX ADMIN — IPRATROPIUM BROMIDE AND ALBUTEROL SULFATE 1 DOSE: 2.5; .5 SOLUTION RESPIRATORY (INHALATION) at 08:49

## 2025-01-01 RX ADMIN — MULTIVITAMIN TABLET 1 TABLET: TABLET at 10:25

## 2025-01-01 RX ADMIN — IPRATROPIUM BROMIDE AND ALBUTEROL SULFATE 1 DOSE: 2.5; .5 SOLUTION RESPIRATORY (INHALATION) at 08:07

## 2025-01-01 RX ADMIN — PETROLATUM: 420 OINTMENT TOPICAL at 21:35

## 2025-01-01 RX ADMIN — PANTOPRAZOLE SODIUM 40 MG: 40 INJECTION, POWDER, FOR SOLUTION INTRAVENOUS at 09:41

## 2025-01-01 RX ADMIN — BUDESONIDE 500 MCG: 0.5 INHALANT RESPIRATORY (INHALATION) at 20:37

## 2025-01-01 RX ADMIN — DEXTROSE: 5 SOLUTION INTRAVENOUS at 04:52

## 2025-01-01 RX ADMIN — MAGNESIUM SULFATE HEPTAHYDRATE 1000 MG: 1 INJECTION, SOLUTION INTRAVENOUS at 06:23

## 2025-01-01 RX ADMIN — IPRATROPIUM BROMIDE AND ALBUTEROL SULFATE 1 DOSE: 2.5; .5 SOLUTION RESPIRATORY (INHALATION) at 15:20

## 2025-01-01 RX ADMIN — PETROLATUM: 420 OINTMENT TOPICAL at 09:10

## 2025-01-01 RX ADMIN — DEXTROSE MONOHYDRATE: 50 INJECTION, SOLUTION INTRAVENOUS at 09:23

## 2025-01-01 RX ADMIN — SODIUM CHLORIDE, PRESERVATIVE FREE 5 ML: 5 INJECTION INTRAVENOUS at 20:30

## 2025-01-01 RX ADMIN — MICAFUNGIN SODIUM 100 MG: 100 INJECTION, POWDER, LYOPHILIZED, FOR SOLUTION INTRAVENOUS at 13:55

## 2025-01-01 RX ADMIN — SODIUM CHLORIDE, PRESERVATIVE FREE 10 ML: 5 INJECTION INTRAVENOUS at 21:33

## 2025-01-01 RX ADMIN — WATER 1000 MG: 1 INJECTION INTRAMUSCULAR; INTRAVENOUS; SUBCUTANEOUS at 17:35

## 2025-01-01 RX ADMIN — RIFAXIMIN 400 MG: 200 TABLET ORAL at 21:21

## 2025-01-01 RX ADMIN — MIDAZOLAM 2 MG: 1 INJECTION INTRAMUSCULAR; INTRAVENOUS at 13:44

## 2025-01-01 RX ADMIN — IPRATROPIUM BROMIDE AND ALBUTEROL SULFATE 1 DOSE: 2.5; .5 SOLUTION RESPIRATORY (INHALATION) at 12:07

## 2025-01-01 RX ADMIN — LACTULOSE 20 G: 10 SOLUTION ORAL at 08:07

## 2025-01-01 RX ADMIN — WARFARIN SODIUM 1 MG: 1 TABLET ORAL at 11:38

## 2025-01-01 RX ADMIN — ONDANSETRON 4 MG: 2 INJECTION, SOLUTION INTRAMUSCULAR; INTRAVENOUS at 21:38

## 2025-01-01 RX ADMIN — GLYCOPYRROLATE 0.2 MG: 0.2 INJECTION INTRAMUSCULAR; INTRAVENOUS at 11:55

## 2025-01-01 RX ADMIN — CHLORHEXIDINE GLUCONATE, 0.12% ORAL RINSE 15 ML: 1.2 SOLUTION DENTAL at 20:42

## 2025-01-01 RX ADMIN — MIDODRINE HYDROCHLORIDE 5 MG: 5 TABLET ORAL at 18:15

## 2025-01-01 RX ADMIN — MULTIVITAMIN TABLET 1 TABLET: TABLET at 08:45

## 2025-01-01 RX ADMIN — RIFAXIMIN 400 MG: 200 TABLET ORAL at 13:49

## 2025-01-01 RX ADMIN — AZTREONAM 1000 MG: 1 INJECTION, POWDER, LYOPHILIZED, FOR SOLUTION INTRAMUSCULAR; INTRAVENOUS at 16:12

## 2025-01-01 RX ADMIN — RIFAXIMIN 550 MG: 550 TABLET ORAL at 08:46

## 2025-01-01 RX ADMIN — MIDODRINE HYDROCHLORIDE 5 MG: 5 TABLET ORAL at 09:43

## 2025-01-01 RX ADMIN — HEPARIN SODIUM 14.7 UNITS/KG/HR: 10000 INJECTION, SOLUTION INTRAVENOUS at 02:43

## 2025-01-01 RX ADMIN — HYDROCORTISONE SODIUM SUCCINATE 50 MG: 100 INJECTION INTRAMUSCULAR; INTRAVENOUS at 03:09

## 2025-01-01 RX ADMIN — LACTULOSE 20 G: 10 SOLUTION ORAL at 15:35

## 2025-01-01 RX ADMIN — ATORVASTATIN CALCIUM 20 MG: 20 TABLET, FILM COATED ORAL at 21:13

## 2025-01-01 RX ADMIN — SODIUM CHLORIDE, PRESERVATIVE FREE 10 ML: 5 INJECTION INTRAVENOUS at 08:45

## 2025-01-01 RX ADMIN — LORAZEPAM 1 MG: 2 INJECTION INTRAMUSCULAR; INTRAVENOUS at 10:52

## 2025-01-01 RX ADMIN — SODIUM CHLORIDE, PRESERVATIVE FREE 10 ML: 5 INJECTION INTRAVENOUS at 09:11

## 2025-01-01 RX ADMIN — THIAMINE HYDROCHLORIDE 250 MG: 100 INJECTION, SOLUTION INTRAMUSCULAR; INTRAVENOUS at 08:42

## 2025-01-01 RX ADMIN — Medication 50 MCG/HR: at 15:50

## 2025-01-01 RX ADMIN — RIFAXIMIN 400 MG: 200 TABLET ORAL at 21:27

## 2025-01-01 RX ADMIN — ARFORMOTEROL TARTRATE 15 MCG: 15 SOLUTION RESPIRATORY (INHALATION) at 20:37

## 2025-01-01 RX ADMIN — MULTIVITAMIN TABLET 1 TABLET: TABLET at 08:43

## 2025-01-01 RX ADMIN — PANTOPRAZOLE SODIUM 40 MG: 40 INJECTION, POWDER, FOR SOLUTION INTRAVENOUS at 09:07

## 2025-01-01 RX ADMIN — FUROSEMIDE 20 MG: 20 TABLET ORAL at 10:27

## 2025-01-01 RX ADMIN — Medication 1000 UNITS: at 09:43

## 2025-01-01 RX ADMIN — PANTOPRAZOLE SODIUM 40 MG: 40 INJECTION, POWDER, FOR SOLUTION INTRAVENOUS at 20:00

## 2025-01-01 RX ADMIN — Medication 1 MCG/MIN: at 15:39

## 2025-01-01 RX ADMIN — HYDROCORTISONE SODIUM SUCCINATE 50 MG: 100 INJECTION INTRAMUSCULAR; INTRAVENOUS at 15:52

## 2025-01-01 RX ADMIN — SODIUM CHLORIDE, PRESERVATIVE FREE 10 ML: 5 INJECTION INTRAVENOUS at 08:48

## 2025-01-01 RX ADMIN — BUDESONIDE 500 MCG: 0.5 INHALANT RESPIRATORY (INHALATION) at 07:56

## 2025-01-01 RX ADMIN — BUDESONIDE 500 MCG: 0.5 INHALANT RESPIRATORY (INHALATION) at 08:58

## 2025-01-01 RX ADMIN — SENNOSIDES AND DOCUSATE SODIUM 2 TABLET: 8.6; 5 TABLET ORAL at 09:43

## 2025-01-01 RX ADMIN — BUDESONIDE 500 MCG: 0.5 INHALANT RESPIRATORY (INHALATION) at 07:49

## 2025-01-01 RX ADMIN — BUSPIRONE HYDROCHLORIDE 5 MG: 5 TABLET ORAL at 20:00

## 2025-01-01 RX ADMIN — LACTULOSE 20 G: 10 SOLUTION ORAL at 21:17

## 2025-01-01 RX ADMIN — PETROLATUM: 420 OINTMENT TOPICAL at 21:00

## 2025-01-01 RX ADMIN — Medication 75 MCG/HR: at 19:47

## 2025-01-01 RX ADMIN — LACTULOSE 20 G: 10 SOLUTION ORAL at 21:26

## 2025-01-01 RX ADMIN — CHLORHEXIDINE GLUCONATE, 0.12% ORAL RINSE 15 ML: 1.2 SOLUTION DENTAL at 08:19

## 2025-01-01 RX ADMIN — BUSPIRONE HYDROCHLORIDE 5 MG: 5 TABLET ORAL at 08:06

## 2025-01-01 RX ADMIN — DEXMEDETOMIDINE 0.4 MCG/KG/HR: 100 INJECTION, SOLUTION INTRAVENOUS at 18:43

## 2025-01-01 RX ADMIN — ARFORMOTEROL TARTRATE 15 MCG: 15 SOLUTION RESPIRATORY (INHALATION) at 09:03

## 2025-01-01 RX ADMIN — LACTULOSE 20 G: 10 SOLUTION ORAL at 10:25

## 2025-01-01 RX ADMIN — IPRATROPIUM BROMIDE AND ALBUTEROL SULFATE 1 DOSE: 2.5; .5 SOLUTION RESPIRATORY (INHALATION) at 21:00

## 2025-01-01 RX ADMIN — Medication 10 MG: at 21:17

## 2025-01-01 RX ADMIN — LEVETIRACETAM 500 MG: 100 INJECTION INTRAVENOUS at 23:13

## 2025-01-01 RX ADMIN — LACTULOSE 20 G: 10 SOLUTION ORAL at 21:11

## 2025-01-01 RX ADMIN — CHLORHEXIDINE GLUCONATE, 0.12% ORAL RINSE 15 ML: 1.2 SOLUTION DENTAL at 21:03

## 2025-01-01 RX ADMIN — LEVETIRACETAM 500 MG: 100 INJECTION INTRAVENOUS at 23:27

## 2025-01-01 RX ADMIN — Medication 1000 UNITS: at 08:46

## 2025-01-01 RX ADMIN — PETROLATUM: 420 OINTMENT TOPICAL at 09:03

## 2025-01-01 RX ADMIN — MULTIVITAMIN TABLET 1 TABLET: TABLET at 08:06

## 2025-01-01 RX ADMIN — ARFORMOTEROL TARTRATE 15 MCG: 15 SOLUTION RESPIRATORY (INHALATION) at 09:54

## 2025-01-01 RX ADMIN — WARFARIN SODIUM 2.5 MG: 2.5 TABLET ORAL at 18:49

## 2025-01-01 RX ADMIN — LACTULOSE 20 G: 10 SOLUTION ORAL at 16:46

## 2025-01-01 RX ADMIN — SODIUM CHLORIDE, PRESERVATIVE FREE 10 ML: 5 INJECTION INTRAVENOUS at 07:58

## 2025-01-01 RX ADMIN — PYRIDOXINE HCL TAB 50 MG 100 MG: 50 TAB at 08:47

## 2025-01-01 RX ADMIN — FOLIC ACID 1 MG: 1 TABLET ORAL at 07:57

## 2025-01-01 RX ADMIN — ARFORMOTEROL TARTRATE 15 MCG: 15 SOLUTION RESPIRATORY (INHALATION) at 21:00

## 2025-01-01 RX ADMIN — Medication 100 MCG/HR: at 04:56

## 2025-01-01 RX ADMIN — Medication 10 MG: at 20:59

## 2025-01-01 RX ADMIN — SODIUM CHLORIDE, PRESERVATIVE FREE 10 ML: 5 INJECTION INTRAVENOUS at 21:00

## 2025-01-01 RX ADMIN — LACTULOSE 20 G: 10 SOLUTION ORAL at 13:49

## 2025-01-01 RX ADMIN — FOLIC ACID 1 MG: 5 INJECTION, SOLUTION INTRAMUSCULAR; INTRAVENOUS; SUBCUTANEOUS at 08:34

## 2025-01-01 RX ADMIN — LACOSAMIDE 50 MG: 10 INJECTION INTRAVENOUS at 11:27

## 2025-01-01 RX ADMIN — RIFAXIMIN 400 MG: 200 TABLET ORAL at 13:15

## 2025-01-01 RX ADMIN — ARFORMOTEROL TARTRATE 15 MCG: 15 SOLUTION RESPIRATORY (INHALATION) at 20:35

## 2025-01-01 RX ADMIN — LACTULOSE 20 G: 10 SOLUTION ORAL at 13:45

## 2025-01-01 RX ADMIN — BUDESONIDE 500 MCG: 0.5 INHALANT RESPIRATORY (INHALATION) at 20:08

## 2025-01-01 RX ADMIN — MAGNESIUM SULFATE HEPTAHYDRATE 2000 MG: 40 INJECTION, SOLUTION INTRAVENOUS at 12:23

## 2025-01-01 RX ADMIN — MULTIVITAMIN TABLET 1 TABLET: TABLET at 10:13

## 2025-01-01 RX ADMIN — RIFAXIMIN 400 MG: 200 TABLET ORAL at 07:57

## 2025-01-01 RX ADMIN — IPRATROPIUM BROMIDE AND ALBUTEROL SULFATE 1 DOSE: 2.5; .5 SOLUTION RESPIRATORY (INHALATION) at 17:02

## 2025-01-01 RX ADMIN — PANTOPRAZOLE SODIUM 40 MG: 40 INJECTION, POWDER, FOR SOLUTION INTRAVENOUS at 20:34

## 2025-01-01 RX ADMIN — PETROLATUM: 420 OINTMENT TOPICAL at 19:36

## 2025-01-01 RX ADMIN — LEVETIRACETAM 500 MG: 100 INJECTION INTRAVENOUS at 10:50

## 2025-01-01 RX ADMIN — LEVETIRACETAM 500 MG: 100 INJECTION INTRAVENOUS at 00:00

## 2025-01-01 RX ADMIN — POTASSIUM CHLORIDE 10 MEQ: 7.46 INJECTION, SOLUTION INTRAVENOUS at 08:29

## 2025-01-01 RX ADMIN — PANTOPRAZOLE SODIUM 40 MG: 40 INJECTION, POWDER, FOR SOLUTION INTRAVENOUS at 21:22

## 2025-01-01 RX ADMIN — RIFAXIMIN 550 MG: 550 TABLET ORAL at 09:42

## 2025-01-01 RX ADMIN — ARFORMOTEROL TARTRATE 15 MCG: 15 SOLUTION RESPIRATORY (INHALATION) at 20:29

## 2025-01-01 RX ADMIN — IPRATROPIUM BROMIDE AND ALBUTEROL SULFATE 1 DOSE: 2.5; .5 SOLUTION RESPIRATORY (INHALATION) at 12:27

## 2025-01-01 RX ADMIN — SODIUM CHLORIDE, PRESERVATIVE FREE 10 ML: 5 INJECTION INTRAVENOUS at 20:33

## 2025-01-01 RX ADMIN — IPRATROPIUM BROMIDE AND ALBUTEROL SULFATE 1 DOSE: 2.5; .5 SOLUTION RESPIRATORY (INHALATION) at 11:41

## 2025-01-01 RX ADMIN — LACTULOSE 20 G: 10 SOLUTION ORAL at 10:12

## 2025-01-01 RX ADMIN — DEXMEDETOMIDINE 0.2 MCG/KG/HR: 100 INJECTION, SOLUTION INTRAVENOUS at 17:23

## 2025-01-01 RX ADMIN — PETROLATUM: 420 OINTMENT TOPICAL at 07:44

## 2025-01-01 RX ADMIN — POTASSIUM CHLORIDE 20 MEQ: 29.8 INJECTION, SOLUTION INTRAVENOUS at 03:05

## 2025-01-01 RX ADMIN — MIDODRINE HYDROCHLORIDE 15 MG: 10 TABLET ORAL at 09:42

## 2025-01-01 RX ADMIN — ALBUMIN (HUMAN) 25 G: 0.25 INJECTION, SOLUTION INTRAVENOUS at 18:06

## 2025-01-01 RX ADMIN — MORPHINE SULFATE 2 MG: 2 INJECTION, SOLUTION INTRAMUSCULAR; INTRAVENOUS at 11:54

## 2025-01-01 RX ADMIN — IPRATROPIUM BROMIDE AND ALBUTEROL SULFATE 1 DOSE: 2.5; .5 SOLUTION RESPIRATORY (INHALATION) at 09:54

## 2025-01-01 RX ADMIN — SODIUM CHLORIDE, PRESERVATIVE FREE 10 ML: 5 INJECTION INTRAVENOUS at 21:04

## 2025-01-01 RX ADMIN — LEVETIRACETAM 250 MG: 100 INJECTION INTRAVENOUS at 08:16

## 2025-01-01 RX ADMIN — METOPROLOL TARTRATE 12.5 MG: 25 TABLET, FILM COATED ORAL at 21:11

## 2025-01-01 RX ADMIN — LACTULOSE 20 G: 10 SOLUTION ORAL at 08:42

## 2025-01-01 RX ADMIN — FUROSEMIDE 20 MG: 20 TABLET ORAL at 08:35

## 2025-01-01 RX ADMIN — THIAMINE HYDROCHLORIDE 100 MG: 100 INJECTION, SOLUTION INTRAMUSCULAR; INTRAVENOUS at 09:42

## 2025-01-01 RX ADMIN — METOCLOPRAMIDE 5 MG: 5 INJECTION, SOLUTION INTRAMUSCULAR; INTRAVENOUS at 14:51

## 2025-01-01 RX ADMIN — HYDROCORTISONE SODIUM SUCCINATE 50 MG: 100 INJECTION INTRAMUSCULAR; INTRAVENOUS at 19:21

## 2025-01-01 RX ADMIN — FUROSEMIDE 40 MG: 10 INJECTION, SOLUTION INTRAMUSCULAR; INTRAVENOUS at 11:23

## 2025-01-01 RX ADMIN — PETROLATUM: 420 OINTMENT TOPICAL at 21:38

## 2025-01-01 RX ADMIN — MIDODRINE HYDROCHLORIDE 5 MG: 5 TABLET ORAL at 17:28

## 2025-01-01 RX ADMIN — SODIUM CHLORIDE, PRESERVATIVE FREE 10 ML: 5 INJECTION INTRAVENOUS at 09:42

## 2025-01-01 RX ADMIN — POTASSIUM CHLORIDE 20 MEQ: 29.8 INJECTION INTRAVENOUS at 23:12

## 2025-01-01 RX ADMIN — LACOSAMIDE 50 MG: 10 INJECTION INTRAVENOUS at 21:24

## 2025-01-01 RX ADMIN — Medication 200 MCG/HR: at 22:09

## 2025-01-01 RX ADMIN — METOPROLOL TARTRATE 12.5 MG: 25 TABLET, FILM COATED ORAL at 08:51

## 2025-01-01 RX ADMIN — Medication 76 MCG/HR: at 05:48

## 2025-01-01 RX ADMIN — Medication 7 MCG/MIN: at 16:36

## 2025-01-01 RX ADMIN — MEROPENEM 2000 MG: 2 INJECTION, POWDER, FOR SOLUTION INTRAVENOUS at 12:56

## 2025-01-01 RX ADMIN — LEVETIRACETAM 500 MG: 100 INJECTION INTRAVENOUS at 22:26

## 2025-01-01 RX ADMIN — MIDODRINE HYDROCHLORIDE 15 MG: 10 TABLET ORAL at 13:15

## 2025-01-01 RX ADMIN — MIDODRINE HYDROCHLORIDE 15 MG: 10 TABLET ORAL at 17:13

## 2025-01-01 RX ADMIN — WARFARIN SODIUM 1.5 MG: 3 TABLET ORAL at 18:08

## 2025-01-01 RX ADMIN — METOPROLOL TARTRATE 12.5 MG: 25 TABLET, FILM COATED ORAL at 21:38

## 2025-01-01 RX ADMIN — PHYTONADIONE 10 MG: 10 INJECTION, EMULSION INTRAMUSCULAR; INTRAVENOUS; SUBCUTANEOUS at 04:48

## 2025-01-01 RX ADMIN — LEVETIRACETAM 250 MG: 100 INJECTION INTRAVENOUS at 20:43

## 2025-01-01 RX ADMIN — SODIUM BICARBONATE 50 MEQ: 84 INJECTION INTRAVENOUS at 00:10

## 2025-01-01 RX ADMIN — POTASSIUM CHLORIDE 40 MEQ: 29.8 INJECTION, SOLUTION INTRAVENOUS at 03:17

## 2025-01-01 RX ADMIN — LACTULOSE 20 G: 10 SOLUTION ORAL at 09:41

## 2025-01-01 RX ADMIN — PETROLATUM: 420 OINTMENT TOPICAL at 07:59

## 2025-01-01 RX ADMIN — MIDODRINE HYDROCHLORIDE 5 MG: 5 TABLET ORAL at 21:42

## 2025-01-01 RX ADMIN — SODIUM CHLORIDE, PRESERVATIVE FREE 10 ML: 5 INJECTION INTRAVENOUS at 20:06

## 2025-01-01 RX ADMIN — IPRATROPIUM BROMIDE AND ALBUTEROL SULFATE 1 DOSE: 2.5; .5 SOLUTION RESPIRATORY (INHALATION) at 07:56

## 2025-01-01 RX ADMIN — MIDAZOLAM HYDROCHLORIDE 2 MG: 2 INJECTION, SOLUTION INTRAMUSCULAR; INTRAVENOUS at 09:30

## 2025-01-01 RX ADMIN — MIDODRINE HYDROCHLORIDE 5 MG: 5 TABLET ORAL at 10:14

## 2025-01-01 RX ADMIN — METOCLOPRAMIDE 5 MG: 5 INJECTION, SOLUTION INTRAMUSCULAR; INTRAVENOUS at 03:22

## 2025-01-01 RX ADMIN — IPRATROPIUM BROMIDE AND ALBUTEROL SULFATE 1 DOSE: 2.5; .5 SOLUTION RESPIRATORY (INHALATION) at 17:21

## 2025-01-01 RX ADMIN — WARFARIN SODIUM 1.5 MG: 3 TABLET ORAL at 17:50

## 2025-01-01 RX ADMIN — WARFARIN SODIUM 1.5 MG: 3 TABLET ORAL at 17:13

## 2025-01-01 RX ADMIN — ARFORMOTEROL TARTRATE 15 MCG: 15 SOLUTION RESPIRATORY (INHALATION) at 19:52

## 2025-01-01 RX ADMIN — PYRIDOXINE HCL TAB 50 MG 50 MG: 50 TAB at 08:35

## 2025-01-01 RX ADMIN — HEPARIN SODIUM 2000 UNITS: 1000 INJECTION INTRAVENOUS; SUBCUTANEOUS at 17:56

## 2025-01-01 RX ADMIN — LACTULOSE 20 G: 10 SOLUTION ORAL at 08:34

## 2025-01-01 RX ADMIN — WARFARIN SODIUM 2.5 MG: 5 TABLET ORAL at 17:53

## 2025-01-01 RX ADMIN — BUMETANIDE 1 MG/HR: 0.25 INJECTION INTRAMUSCULAR; INTRAVENOUS at 03:42

## 2025-01-01 RX ADMIN — HEPARIN SODIUM 4000 UNITS: 1000 INJECTION INTRAVENOUS; SUBCUTANEOUS at 11:39

## 2025-01-01 RX ADMIN — ONDANSETRON 4 MG: 2 INJECTION, SOLUTION INTRAMUSCULAR; INTRAVENOUS at 00:30

## 2025-01-01 RX ADMIN — MIDODRINE HYDROCHLORIDE 5 MG: 5 TABLET ORAL at 08:07

## 2025-01-01 RX ADMIN — GUAIFENESIN 400 MG: 400 TABLET ORAL at 11:24

## 2025-01-01 RX ADMIN — SODIUM CHLORIDE, PRESERVATIVE FREE 10 ML: 5 INJECTION INTRAVENOUS at 21:20

## 2025-01-01 RX ADMIN — IPRATROPIUM BROMIDE AND ALBUTEROL SULFATE 1 DOSE: 2.5; .5 SOLUTION RESPIRATORY (INHALATION) at 15:55

## 2025-01-01 RX ADMIN — DEXTROSE: 5 SOLUTION INTRAVENOUS at 09:04

## 2025-01-01 RX ADMIN — THIAMINE HYDROCHLORIDE 100 MG: 100 INJECTION, SOLUTION INTRAMUSCULAR; INTRAVENOUS at 10:21

## 2025-01-01 RX ADMIN — LEVETIRACETAM 500 MG: 100 INJECTION INTRAVENOUS at 11:21

## 2025-01-01 RX ADMIN — IPRATROPIUM BROMIDE AND ALBUTEROL SULFATE 1 DOSE: 2.5; .5 SOLUTION RESPIRATORY (INHALATION) at 20:29

## 2025-01-01 RX ADMIN — MEROPENEM 2000 MG: 2 INJECTION, POWDER, FOR SOLUTION INTRAVENOUS at 21:30

## 2025-01-01 RX ADMIN — THIAMINE HYDROCHLORIDE 100 MG: 100 INJECTION, SOLUTION INTRAMUSCULAR; INTRAVENOUS at 07:44

## 2025-01-01 RX ADMIN — IPRATROPIUM BROMIDE AND ALBUTEROL SULFATE 1 DOSE: 2.5; .5 SOLUTION RESPIRATORY (INHALATION) at 12:32

## 2025-01-01 RX ADMIN — LEVETIRACETAM 250 MG: 100 INJECTION INTRAVENOUS at 08:34

## 2025-01-01 RX ADMIN — IPRATROPIUM BROMIDE AND ALBUTEROL SULFATE 1 DOSE: 2.5; .5 SOLUTION RESPIRATORY (INHALATION) at 09:53

## 2025-01-01 RX ADMIN — LORAZEPAM 3 MG: 2 INJECTION INTRAMUSCULAR; INTRAVENOUS at 10:24

## 2025-01-01 RX ADMIN — MICAFUNGIN SODIUM 100 MG: 100 INJECTION, POWDER, LYOPHILIZED, FOR SOLUTION INTRAVENOUS at 09:10

## 2025-01-01 RX ADMIN — WARFARIN SODIUM 1 MG: 1 TABLET ORAL at 18:00

## 2025-01-01 RX ADMIN — POTASSIUM CHLORIDE: 2 INJECTION, SOLUTION, CONCENTRATE INTRAVENOUS at 13:47

## 2025-01-01 RX ADMIN — ATORVASTATIN CALCIUM 20 MG: 20 TABLET, FILM COATED ORAL at 19:59

## 2025-01-01 RX ADMIN — FOLIC ACID 1 MG: 5 INJECTION, SOLUTION INTRAMUSCULAR; INTRAVENOUS; SUBCUTANEOUS at 08:44

## 2025-01-01 RX ADMIN — DEXMEDETOMIDINE 0.2 MCG/KG/HR: 100 INJECTION, SOLUTION INTRAVENOUS at 23:39

## 2025-01-01 RX ADMIN — BUDESONIDE 500 MCG: 0.5 INHALANT RESPIRATORY (INHALATION) at 08:39

## 2025-01-01 RX ADMIN — LACTULOSE 20 G: 10 SOLUTION ORAL at 14:53

## 2025-01-01 RX ADMIN — BUMETANIDE 1 MG/HR: 0.25 INJECTION INTRAMUSCULAR; INTRAVENOUS at 14:41

## 2025-01-01 RX ADMIN — PANTOPRAZOLE SODIUM 40 MG: 40 INJECTION, POWDER, FOR SOLUTION INTRAVENOUS at 09:39

## 2025-01-01 RX ADMIN — WARFARIN SODIUM 2.5 MG: 2.5 TABLET ORAL at 17:33

## 2025-01-01 RX ADMIN — ARFORMOTEROL TARTRATE 15 MCG: 15 SOLUTION RESPIRATORY (INHALATION) at 08:13

## 2025-01-01 RX ADMIN — SODIUM CHLORIDE, PRESERVATIVE FREE 10 ML: 5 INJECTION INTRAVENOUS at 09:00

## 2025-01-01 RX ADMIN — AZTREONAM 1000 MG: 1 INJECTION, POWDER, LYOPHILIZED, FOR SOLUTION INTRAMUSCULAR; INTRAVENOUS at 16:59

## 2025-01-01 RX ADMIN — LACOSAMIDE 100 MG: 10 INJECTION INTRAVENOUS at 10:07

## 2025-01-01 RX ADMIN — BUSPIRONE HYDROCHLORIDE 5 MG: 5 TABLET ORAL at 21:13

## 2025-01-01 RX ADMIN — LACTULOSE 20 G: 10 SOLUTION ORAL at 08:46

## 2025-01-01 RX ADMIN — PETROLATUM: 420 OINTMENT TOPICAL at 09:06

## 2025-01-01 RX ADMIN — IPRATROPIUM BROMIDE AND ALBUTEROL SULFATE 1 DOSE: 2.5; .5 SOLUTION RESPIRATORY (INHALATION) at 20:21

## 2025-01-01 RX ADMIN — ARFORMOTEROL TARTRATE 15 MCG: 15 SOLUTION RESPIRATORY (INHALATION) at 21:06

## 2025-01-01 RX ADMIN — ATORVASTATIN CALCIUM 20 MG: 20 TABLET, FILM COATED ORAL at 21:11

## 2025-01-01 RX ADMIN — HEPARIN SODIUM 12 UNITS/KG/HR: 10000 INJECTION, SOLUTION INTRAVENOUS at 22:06

## 2025-01-01 RX ADMIN — THIAMINE HYDROCHLORIDE 100 MG: 100 INJECTION, SOLUTION INTRAMUSCULAR; INTRAVENOUS at 08:06

## 2025-01-01 RX ADMIN — CHLORHEXIDINE GLUCONATE, 0.12% ORAL RINSE 15 ML: 1.2 SOLUTION DENTAL at 20:02

## 2025-01-01 RX ADMIN — IPRATROPIUM BROMIDE AND ALBUTEROL SULFATE 1 DOSE: 2.5; .5 SOLUTION RESPIRATORY (INHALATION) at 12:01

## 2025-01-01 RX ADMIN — BUDESONIDE 500 MCG: 0.5 INHALANT RESPIRATORY (INHALATION) at 20:31

## 2025-01-01 RX ADMIN — RIFAXIMIN 400 MG: 200 TABLET ORAL at 08:18

## 2025-01-01 RX ADMIN — LACOSAMIDE 50 MG: 10 INJECTION INTRAVENOUS at 09:00

## 2025-01-01 RX ADMIN — GUAIFENESIN 400 MG: 100 LIQUID ORAL at 15:08

## 2025-01-01 RX ADMIN — CHLORHEXIDINE GLUCONATE, 0.12% ORAL RINSE 15 ML: 1.2 SOLUTION DENTAL at 08:37

## 2025-01-01 RX ADMIN — FOLIC ACID 1 MG: 5 INJECTION, SOLUTION INTRAMUSCULAR; INTRAVENOUS; SUBCUTANEOUS at 08:18

## 2025-01-01 RX ADMIN — AZTREONAM 1000 MG: 1 INJECTION, POWDER, LYOPHILIZED, FOR SOLUTION INTRAMUSCULAR; INTRAVENOUS at 03:56

## 2025-01-01 RX ADMIN — MIDODRINE HYDROCHLORIDE 15 MG: 10 TABLET ORAL at 17:21

## 2025-01-01 RX ADMIN — THIAMINE HYDROCHLORIDE 500 MG: 100 INJECTION, SOLUTION INTRAMUSCULAR; INTRAVENOUS at 21:17

## 2025-01-01 RX ADMIN — SODIUM CHLORIDE 1500 MG: 0.9 INJECTION, SOLUTION INTRAVENOUS at 14:39

## 2025-01-01 RX ADMIN — LEVETIRACETAM 500 MG: 100 INJECTION INTRAVENOUS at 10:54

## 2025-01-01 RX ADMIN — SODIUM CHLORIDE, PRESERVATIVE FREE 10 ML: 5 INJECTION INTRAVENOUS at 22:07

## 2025-01-01 RX ADMIN — HEPARIN SODIUM 14.7 UNITS/KG/HR: 10000 INJECTION, SOLUTION INTRAVENOUS at 12:21

## 2025-01-01 RX ADMIN — FOLIC ACID 1 MG: 5 INJECTION, SOLUTION INTRAMUSCULAR; INTRAVENOUS; SUBCUTANEOUS at 09:08

## 2025-01-01 RX ADMIN — Medication 175 MCG/HR: at 12:25

## 2025-01-01 RX ADMIN — BUDESONIDE 500 MCG: 0.5 INHALANT RESPIRATORY (INHALATION) at 20:22

## 2025-01-01 RX ADMIN — MEROPENEM 2000 MG: 2 INJECTION, POWDER, FOR SOLUTION INTRAVENOUS at 08:25

## 2025-01-01 RX ADMIN — ATORVASTATIN CALCIUM 20 MG: 20 TABLET, FILM COATED ORAL at 20:59

## 2025-01-01 RX ADMIN — LACTULOSE 20 G: 10 SOLUTION ORAL at 20:00

## 2025-01-01 RX ADMIN — DEXMEDETOMIDINE 0.4 MCG/KG/HR: 100 INJECTION, SOLUTION INTRAVENOUS at 12:16

## 2025-01-01 RX ADMIN — PANTOPRAZOLE SODIUM 40 MG: 40 INJECTION, POWDER, FOR SOLUTION INTRAVENOUS at 08:27

## 2025-01-01 RX ADMIN — MULTIVITAMIN TABLET 1 TABLET: TABLET at 10:27

## 2025-01-01 RX ADMIN — PETROLATUM: 420 OINTMENT TOPICAL at 10:48

## 2025-01-01 RX ADMIN — HEPARIN SODIUM 2000 UNITS: 1000 INJECTION INTRAVENOUS; SUBCUTANEOUS at 12:46

## 2025-01-01 RX ADMIN — LEVETIRACETAM 500 MG: 100 INJECTION INTRAVENOUS at 22:31

## 2025-01-01 RX ADMIN — POTASSIUM CHLORIDE 40 MEQ: 29.8 INJECTION, SOLUTION INTRAVENOUS at 23:33

## 2025-01-01 RX ADMIN — RIFAXIMIN 400 MG: 200 TABLET ORAL at 14:37

## 2025-01-01 RX ADMIN — CHLORHEXIDINE GLUCONATE, 0.12% ORAL RINSE 15 ML: 1.2 SOLUTION DENTAL at 20:51

## 2025-01-01 RX ADMIN — SODIUM CHLORIDE, PRESERVATIVE FREE 10 ML: 5 INJECTION INTRAVENOUS at 19:43

## 2025-01-01 RX ADMIN — Medication 200 MCG/HR: at 03:33

## 2025-01-01 RX ADMIN — PYRIDOXINE HCL TAB 50 MG 50 MG: 50 TAB at 08:51

## 2025-01-01 RX ADMIN — MICAFUNGIN SODIUM 100 MG: 100 INJECTION, POWDER, LYOPHILIZED, FOR SOLUTION INTRAVENOUS at 10:53

## 2025-01-01 RX ADMIN — IPRATROPIUM BROMIDE AND ALBUTEROL SULFATE 1 DOSE: 2.5; .5 SOLUTION RESPIRATORY (INHALATION) at 12:11

## 2025-01-01 RX ADMIN — PETROLATUM: 420 OINTMENT TOPICAL at 08:16

## 2025-01-01 RX ADMIN — MIDODRINE HYDROCHLORIDE 5 MG: 5 TABLET ORAL at 08:18

## 2025-01-01 RX ADMIN — SODIUM CHLORIDE 125 MG: 9 INJECTION, SOLUTION INTRAVENOUS at 19:39

## 2025-01-01 RX ADMIN — BUSPIRONE HYDROCHLORIDE 5 MG: 5 TABLET ORAL at 19:42

## 2025-01-01 RX ADMIN — LEVETIRACETAM 500 MG: 100 INJECTION INTRAVENOUS at 22:51

## 2025-01-01 RX ADMIN — PANTOPRAZOLE SODIUM 40 MG: 40 INJECTION, POWDER, FOR SOLUTION INTRAVENOUS at 21:51

## 2025-01-01 RX ADMIN — ACETYLCYSTEINE 600 MG: 200 INHALANT RESPIRATORY (INHALATION) at 08:49

## 2025-01-01 RX ADMIN — PANTOPRAZOLE SODIUM 40 MG: 40 INJECTION, POWDER, FOR SOLUTION INTRAVENOUS at 21:21

## 2025-01-01 RX ADMIN — METOPROLOL TARTRATE 12.5 MG: 25 TABLET, FILM COATED ORAL at 21:13

## 2025-01-01 RX ADMIN — THIAMINE HYDROCHLORIDE 100 MG: 100 INJECTION, SOLUTION INTRAMUSCULAR; INTRAVENOUS at 08:16

## 2025-01-01 RX ADMIN — IPRATROPIUM BROMIDE AND ALBUTEROL SULFATE 1 DOSE: 2.5; .5 SOLUTION RESPIRATORY (INHALATION) at 16:38

## 2025-01-01 RX ADMIN — IPRATROPIUM BROMIDE AND ALBUTEROL SULFATE 1 DOSE: 2.5; .5 SOLUTION RESPIRATORY (INHALATION) at 12:42

## 2025-01-01 RX ADMIN — MIDODRINE HYDROCHLORIDE 15 MG: 10 TABLET ORAL at 12:21

## 2025-01-01 RX ADMIN — POTASSIUM CHLORIDE 10 MEQ: 7.46 INJECTION, SOLUTION INTRAVENOUS at 09:25

## 2025-01-01 RX ADMIN — POLYETHYLENE GLYCOL 3350 17 G: 17 POWDER, FOR SOLUTION ORAL at 08:53

## 2025-01-01 RX ADMIN — ATORVASTATIN CALCIUM 20 MG: 20 TABLET, FILM COATED ORAL at 20:51

## 2025-01-01 RX ADMIN — MIDAZOLAM 2 MG: 1 INJECTION INTRAMUSCULAR; INTRAVENOUS at 09:30

## 2025-01-01 RX ADMIN — AZTREONAM 1000 MG: 1 INJECTION, POWDER, LYOPHILIZED, FOR SOLUTION INTRAMUSCULAR; INTRAVENOUS at 16:00

## 2025-01-01 RX ADMIN — SODIUM CHLORIDE, PRESERVATIVE FREE 10 ML: 5 INJECTION INTRAVENOUS at 20:03

## 2025-01-01 RX ADMIN — IPRATROPIUM BROMIDE AND ALBUTEROL SULFATE 1 DOSE: 2.5; .5 SOLUTION RESPIRATORY (INHALATION) at 08:55

## 2025-01-01 RX ADMIN — FOLIC ACID 1 MG: 1 TABLET ORAL at 08:07

## 2025-01-01 RX ADMIN — METOPROLOL TARTRATE 12.5 MG: 25 TABLET, FILM COATED ORAL at 08:45

## 2025-01-01 RX ADMIN — ARFORMOTEROL TARTRATE 15 MCG: 15 SOLUTION RESPIRATORY (INHALATION) at 20:36

## 2025-01-01 RX ADMIN — IPRATROPIUM BROMIDE AND ALBUTEROL SULFATE 1 DOSE: 2.5; .5 SOLUTION RESPIRATORY (INHALATION) at 11:24

## 2025-01-01 RX ADMIN — PETROLATUM: 420 OINTMENT TOPICAL at 21:14

## 2025-01-01 RX ADMIN — BUSPIRONE HYDROCHLORIDE 5 MG: 5 TABLET ORAL at 08:53

## 2025-01-01 RX ADMIN — THIAMINE HYDROCHLORIDE 500 MG: 100 INJECTION, SOLUTION INTRAMUSCULAR; INTRAVENOUS at 21:15

## 2025-01-01 RX ADMIN — FUROSEMIDE 40 MG: 10 INJECTION, SOLUTION INTRAMUSCULAR; INTRAVENOUS at 17:45

## 2025-01-01 RX ADMIN — ATORVASTATIN CALCIUM 20 MG: 20 TABLET, FILM COATED ORAL at 21:08

## 2025-01-01 RX ADMIN — PETROLATUM: 420 OINTMENT TOPICAL at 21:52

## 2025-01-01 RX ADMIN — ATORVASTATIN CALCIUM 20 MG: 20 TABLET, FILM COATED ORAL at 21:34

## 2025-01-01 RX ADMIN — ACETAMINOPHEN 650 MG: 325 TABLET ORAL at 10:45

## 2025-01-01 RX ADMIN — MULTIVITAMIN TABLET 1 TABLET: TABLET at 08:35

## 2025-01-01 RX ADMIN — MULTIVITAMIN TABLET 1 TABLET: TABLET at 08:51

## 2025-01-01 RX ADMIN — LACTULOSE 20 G: 10 SOLUTION ORAL at 13:15

## 2025-01-01 RX ADMIN — BUSPIRONE HYDROCHLORIDE 5 MG: 5 TABLET ORAL at 07:57

## 2025-01-01 RX ADMIN — GUAIFENESIN 400 MG: 100 LIQUID ORAL at 20:59

## 2025-01-01 RX ADMIN — METOPROLOL TARTRATE 12.5 MG: 25 TABLET, FILM COATED ORAL at 10:27

## 2025-01-01 RX ADMIN — SODIUM CHLORIDE, PRESERVATIVE FREE 10 ML: 5 INJECTION INTRAVENOUS at 19:36

## 2025-01-01 RX ADMIN — HEPARIN SODIUM 14.7 UNITS/KG/HR: 10000 INJECTION, SOLUTION INTRAVENOUS at 00:34

## 2025-01-01 RX ADMIN — SODIUM CHLORIDE: 0.9 INJECTION, SOLUTION INTRAVENOUS at 23:08

## 2025-01-01 RX ADMIN — IPRATROPIUM BROMIDE AND ALBUTEROL SULFATE 1 DOSE: 2.5; .5 SOLUTION RESPIRATORY (INHALATION) at 19:52

## 2025-01-01 RX ADMIN — MIDODRINE HYDROCHLORIDE 5 MG: 5 TABLET ORAL at 13:22

## 2025-01-01 RX ADMIN — IPRATROPIUM BROMIDE AND ALBUTEROL SULFATE 1 DOSE: 2.5; .5 SOLUTION RESPIRATORY (INHALATION) at 15:25

## 2025-01-01 RX ADMIN — RIFAXIMIN 550 MG: 550 TABLET ORAL at 21:13

## 2025-01-01 RX ADMIN — LACTULOSE 20 G: 10 SOLUTION ORAL at 20:02

## 2025-01-01 RX ADMIN — LEVETIRACETAM 500 MG: 100 INJECTION INTRAVENOUS at 12:05

## 2025-01-01 RX ADMIN — PETROLATUM: 420 OINTMENT TOPICAL at 09:42

## 2025-01-01 RX ADMIN — ARFORMOTEROL TARTRATE 15 MCG: 15 SOLUTION RESPIRATORY (INHALATION) at 08:39

## 2025-01-01 RX ADMIN — MIDAZOLAM 2 MG: 1 INJECTION INTRAMUSCULAR; INTRAVENOUS at 20:32

## 2025-01-01 RX ADMIN — ATORVASTATIN CALCIUM 20 MG: 20 TABLET, FILM COATED ORAL at 20:07

## 2025-01-01 RX ADMIN — RIFAXIMIN 400 MG: 200 TABLET ORAL at 13:45

## 2025-01-01 RX ADMIN — RIFAXIMIN 550 MG: 550 TABLET ORAL at 08:44

## 2025-01-01 RX ADMIN — HEPARIN SODIUM 14.7 UNITS/KG/HR: 10000 INJECTION, SOLUTION INTRAVENOUS at 21:13

## 2025-01-01 RX ADMIN — PETROLATUM: 420 OINTMENT TOPICAL at 22:07

## 2025-01-01 RX ADMIN — BUDESONIDE 500 MCG: 0.5 INHALANT RESPIRATORY (INHALATION) at 09:03

## 2025-01-01 RX ADMIN — THIAMINE HYDROCHLORIDE 100 MG: 100 INJECTION, SOLUTION INTRAMUSCULAR; INTRAVENOUS at 08:18

## 2025-01-01 RX ADMIN — PETROLATUM: 420 OINTMENT TOPICAL at 20:53

## 2025-01-01 RX ADMIN — PETROLATUM: 420 OINTMENT TOPICAL at 21:11

## 2025-01-01 RX ADMIN — IPRATROPIUM BROMIDE AND ALBUTEROL SULFATE 1 DOSE: 2.5; .5 SOLUTION RESPIRATORY (INHALATION) at 20:22

## 2025-01-01 RX ADMIN — HEPARIN SODIUM 2000 UNITS: 1000 INJECTION INTRAVENOUS; SUBCUTANEOUS at 14:51

## 2025-01-01 RX ADMIN — RIFAXIMIN 400 MG: 200 TABLET ORAL at 09:42

## 2025-01-01 RX ADMIN — LIDOCAINE HYDROCHLORIDE: 10 INJECTION, SOLUTION EPIDURAL; INFILTRATION; INTRACAUDAL; PERINEURAL at 16:26

## 2025-01-01 RX ADMIN — ONDANSETRON 4 MG: 2 INJECTION, SOLUTION INTRAMUSCULAR; INTRAVENOUS at 16:04

## 2025-01-01 RX ADMIN — LACOSAMIDE 50 MG: 10 INJECTION INTRAVENOUS at 21:14

## 2025-01-01 RX ADMIN — PANTOPRAZOLE SODIUM 40 MG: 40 INJECTION, POWDER, FOR SOLUTION INTRAVENOUS at 21:35

## 2025-01-01 RX ADMIN — MIDODRINE HYDROCHLORIDE 20 MG: 10 TABLET ORAL at 17:18

## 2025-01-01 RX ADMIN — MIDAZOLAM 2 MG: 1 INJECTION INTRAMUSCULAR; INTRAVENOUS at 06:28

## 2025-01-01 RX ADMIN — PANTOPRAZOLE SODIUM 40 MG: 40 INJECTION, POWDER, FOR SOLUTION INTRAVENOUS at 20:51

## 2025-01-01 RX ADMIN — RIFAXIMIN 550 MG: 550 TABLET ORAL at 12:48

## 2025-01-01 RX ADMIN — IPRATROPIUM BROMIDE AND ALBUTEROL SULFATE 1 DOSE: 2.5; .5 SOLUTION RESPIRATORY (INHALATION) at 12:51

## 2025-01-01 RX ADMIN — THIAMINE HYDROCHLORIDE 500 MG: 100 INJECTION, SOLUTION INTRAMUSCULAR; INTRAVENOUS at 15:21

## 2025-01-01 RX ADMIN — LACTULOSE 20 G: 10 SOLUTION ORAL at 13:22

## 2025-01-01 RX ADMIN — AZTREONAM 1000 MG: 1 INJECTION, POWDER, LYOPHILIZED, FOR SOLUTION INTRAMUSCULAR; INTRAVENOUS at 04:52

## 2025-01-01 RX ADMIN — BUDESONIDE 500 MCG: 0.5 INHALANT RESPIRATORY (INHALATION) at 08:07

## 2025-01-01 RX ADMIN — IPRATROPIUM BROMIDE AND ALBUTEROL SULFATE 1 DOSE: 2.5; .5 SOLUTION RESPIRATORY (INHALATION) at 08:37

## 2025-01-01 RX ADMIN — CHLORHEXIDINE GLUCONATE, 0.12% ORAL RINSE 15 ML: 1.2 SOLUTION DENTAL at 19:42

## 2025-01-01 RX ADMIN — Medication 4 ML: at 20:37

## 2025-01-01 RX ADMIN — LACTULOSE 20 G: 10 SOLUTION ORAL at 07:57

## 2025-01-01 RX ADMIN — PANTOPRAZOLE SODIUM 40 MG: 40 INJECTION, POWDER, FOR SOLUTION INTRAVENOUS at 21:04

## 2025-01-01 RX ADMIN — FOLIC ACID 1 MG: 5 INJECTION, SOLUTION INTRAMUSCULAR; INTRAVENOUS; SUBCUTANEOUS at 07:49

## 2025-01-01 RX ADMIN — BUDESONIDE 500 MCG: 0.5 INHALANT RESPIRATORY (INHALATION) at 20:36

## 2025-01-01 RX ADMIN — POTASSIUM CHLORIDE 40 MEQ: 29.8 INJECTION, SOLUTION INTRAVENOUS at 09:05

## 2025-01-01 RX ADMIN — LACOSAMIDE 50 MG: 10 INJECTION INTRAVENOUS at 21:27

## 2025-01-01 RX ADMIN — Medication 125 MCG/HR: at 20:51

## 2025-01-01 RX ADMIN — PANTOPRAZOLE SODIUM 40 MG: 40 INJECTION, POWDER, FOR SOLUTION INTRAVENOUS at 08:42

## 2025-01-01 RX ADMIN — IPRATROPIUM BROMIDE AND ALBUTEROL SULFATE 1 DOSE: 2.5; .5 SOLUTION RESPIRATORY (INHALATION) at 16:25

## 2025-01-01 RX ADMIN — PETROLATUM: 420 OINTMENT TOPICAL at 09:02

## 2025-01-01 RX ADMIN — MICAFUNGIN SODIUM 100 MG: 100 INJECTION, POWDER, LYOPHILIZED, FOR SOLUTION INTRAVENOUS at 09:46

## 2025-01-01 RX ADMIN — SODIUM CHLORIDE, PRESERVATIVE FREE 10 ML: 5 INJECTION INTRAVENOUS at 21:38

## 2025-01-01 RX ADMIN — ARFORMOTEROL TARTRATE 15 MCG: 15 SOLUTION RESPIRATORY (INHALATION) at 08:54

## 2025-01-01 RX ADMIN — HEPARIN SODIUM 12 UNITS/KG/HR: 10000 INJECTION, SOLUTION INTRAVENOUS at 02:27

## 2025-01-01 RX ADMIN — PANTOPRAZOLE SODIUM 40 MG: 40 INJECTION, POWDER, FOR SOLUTION INTRAVENOUS at 21:11

## 2025-01-01 RX ADMIN — DEXTROSE MONOHYDRATE: 50 INJECTION, SOLUTION INTRAVENOUS at 12:52

## 2025-01-01 RX ADMIN — Medication 1000 UNITS: at 09:10

## 2025-01-01 RX ADMIN — SODIUM CHLORIDE, PRESERVATIVE FREE 10 ML: 5 INJECTION INTRAVENOUS at 21:15

## 2025-01-01 RX ADMIN — POTASSIUM CHLORIDE: 2 INJECTION, SOLUTION, CONCENTRATE INTRAVENOUS at 02:38

## 2025-01-01 RX ADMIN — FOLIC ACID 1 MG: 5 INJECTION, SOLUTION INTRAMUSCULAR; INTRAVENOUS; SUBCUTANEOUS at 20:17

## 2025-01-01 RX ADMIN — RIFAXIMIN 400 MG: 200 TABLET ORAL at 20:15

## 2025-01-01 RX ADMIN — LEVETIRACETAM 250 MG: 100 INJECTION INTRAVENOUS at 21:26

## 2025-01-01 RX ADMIN — SODIUM CHLORIDE 125 MG: 9 INJECTION, SOLUTION INTRAVENOUS at 09:04

## 2025-01-01 RX ADMIN — MULTIVITAMIN TABLET 1 TABLET: TABLET at 08:46

## 2025-01-01 RX ADMIN — DEXTROSE: 5 SOLUTION INTRAVENOUS at 06:19

## 2025-01-01 RX ADMIN — Medication 75 MCG/HR: at 10:11

## 2025-01-01 RX ADMIN — RIFAXIMIN 550 MG: 550 TABLET ORAL at 21:17

## 2025-01-01 RX ADMIN — BUSPIRONE HYDROCHLORIDE 5 MG: 5 TABLET ORAL at 08:43

## 2025-01-01 RX ADMIN — BUDESONIDE 500 MCG: 0.5 INHALANT RESPIRATORY (INHALATION) at 09:04

## 2025-01-01 RX ADMIN — PANTOPRAZOLE SODIUM 40 MG: 40 INJECTION, POWDER, FOR SOLUTION INTRAVENOUS at 08:07

## 2025-01-01 RX ADMIN — PANTOPRAZOLE SODIUM 40 MG: 40 INJECTION, POWDER, FOR SOLUTION INTRAVENOUS at 08:50

## 2025-01-01 RX ADMIN — LEVETIRACETAM 250 MG: 100 INJECTION INTRAVENOUS at 21:38

## 2025-01-01 RX ADMIN — IPRATROPIUM BROMIDE AND ALBUTEROL SULFATE 1 DOSE: 2.5; .5 SOLUTION RESPIRATORY (INHALATION) at 20:43

## 2025-01-01 RX ADMIN — IPRATROPIUM BROMIDE AND ALBUTEROL SULFATE 1 DOSE: 2.5; .5 SOLUTION RESPIRATORY (INHALATION) at 08:33

## 2025-01-01 RX ADMIN — PANTOPRAZOLE SODIUM 40 MG: 40 INJECTION, POWDER, FOR SOLUTION INTRAVENOUS at 21:27

## 2025-01-01 RX ADMIN — IPRATROPIUM BROMIDE AND ALBUTEROL SULFATE 1 DOSE: 2.5; .5 SOLUTION RESPIRATORY (INHALATION) at 20:05

## 2025-01-01 RX ADMIN — THIAMINE HYDROCHLORIDE 100 MG: 100 INJECTION, SOLUTION INTRAMUSCULAR; INTRAVENOUS at 08:45

## 2025-01-01 RX ADMIN — LEVETIRACETAM 250 MG: 100 INJECTION INTRAVENOUS at 21:52

## 2025-01-01 RX ADMIN — ATORVASTATIN CALCIUM 20 MG: 20 TABLET, FILM COATED ORAL at 22:06

## 2025-01-01 RX ADMIN — MICAFUNGIN SODIUM 100 MG: 100 INJECTION, POWDER, LYOPHILIZED, FOR SOLUTION INTRAVENOUS at 10:05

## 2025-01-01 RX ADMIN — LACTULOSE 20 G: 10 SOLUTION ORAL at 14:22

## 2025-01-01 RX ADMIN — AMLODIPINE BESYLATE 5 MG: 5 TABLET ORAL at 10:42

## 2025-01-01 RX ADMIN — BUDESONIDE 500 MCG: 0.5 INHALANT RESPIRATORY (INHALATION) at 21:00

## 2025-01-01 RX ADMIN — IPRATROPIUM BROMIDE AND ALBUTEROL SULFATE 1 DOSE: 2.5; .5 SOLUTION RESPIRATORY (INHALATION) at 16:18

## 2025-01-01 RX ADMIN — CHLORHEXIDINE GLUCONATE, 0.12% ORAL RINSE 15 ML: 1.2 SOLUTION DENTAL at 19:59

## 2025-01-01 RX ADMIN — LORAZEPAM 3 MG: 2 INJECTION INTRAMUSCULAR; INTRAVENOUS at 16:57

## 2025-01-01 RX ADMIN — BUSPIRONE HYDROCHLORIDE 5 MG: 5 TABLET ORAL at 21:08

## 2025-01-01 RX ADMIN — CHLORHEXIDINE GLUCONATE, 0.12% ORAL RINSE 15 ML: 1.2 SOLUTION DENTAL at 08:30

## 2025-01-01 RX ADMIN — BUSPIRONE HYDROCHLORIDE 5 MG: 5 TABLET ORAL at 21:16

## 2025-01-01 RX ADMIN — IPRATROPIUM BROMIDE AND ALBUTEROL SULFATE 1 DOSE: 2.5; .5 SOLUTION RESPIRATORY (INHALATION) at 13:09

## 2025-01-01 RX ADMIN — FOLIC ACID 1 MG: 5 INJECTION, SOLUTION INTRAMUSCULAR; INTRAVENOUS; SUBCUTANEOUS at 08:49

## 2025-01-01 RX ADMIN — THIAMINE HYDROCHLORIDE 100 MG: 100 INJECTION, SOLUTION INTRAMUSCULAR; INTRAVENOUS at 08:35

## 2025-01-01 RX ADMIN — PYRIDOXINE HCL TAB 50 MG 100 MG: 50 TAB at 10:27

## 2025-01-01 RX ADMIN — GUAIFENESIN 400 MG: 100 LIQUID ORAL at 09:42

## 2025-01-01 RX ADMIN — SODIUM CHLORIDE, PRESERVATIVE FREE 10 ML: 5 INJECTION INTRAVENOUS at 09:02

## 2025-01-01 RX ADMIN — SODIUM CHLORIDE, PRESERVATIVE FREE 10 ML: 5 INJECTION INTRAVENOUS at 20:52

## 2025-01-01 RX ADMIN — ATORVASTATIN CALCIUM 20 MG: 20 TABLET, FILM COATED ORAL at 20:02

## 2025-01-01 RX ADMIN — MIDODRINE HYDROCHLORIDE 5 MG: 5 TABLET ORAL at 12:35

## 2025-01-01 RX ADMIN — ARFORMOTEROL TARTRATE 15 MCG: 15 SOLUTION RESPIRATORY (INHALATION) at 20:43

## 2025-01-01 RX ADMIN — PYRIDOXINE HCL TAB 50 MG 100 MG: 50 TAB at 08:45

## 2025-01-01 RX ADMIN — IPRATROPIUM BROMIDE AND ALBUTEROL SULFATE 1 DOSE: 2.5; .5 SOLUTION RESPIRATORY (INHALATION) at 21:06

## 2025-01-01 RX ADMIN — METOPROLOL TARTRATE 12.5 MG: 25 TABLET, FILM COATED ORAL at 08:35

## 2025-01-01 RX ADMIN — Medication 2500 MG: at 18:47

## 2025-01-01 RX ADMIN — ONDANSETRON 4 MG: 2 INJECTION, SOLUTION INTRAMUSCULAR; INTRAVENOUS at 10:42

## 2025-01-01 RX ADMIN — SODIUM CHLORIDE, PRESERVATIVE FREE 10 ML: 5 INJECTION INTRAVENOUS at 08:19

## 2025-01-01 RX ADMIN — DEXTROSE: 5 SOLUTION INTRAVENOUS at 01:37

## 2025-01-01 RX ADMIN — THIAMINE HYDROCHLORIDE 100 MG: 100 INJECTION, SOLUTION INTRAMUSCULAR; INTRAVENOUS at 09:41

## 2025-01-01 RX ADMIN — BUDESONIDE 500 MCG: 0.5 INHALANT RESPIRATORY (INHALATION) at 08:13

## 2025-01-01 RX ADMIN — RIFAXIMIN 400 MG: 200 TABLET ORAL at 21:13

## 2025-01-01 RX ADMIN — LEVETIRACETAM 250 MG: 100 INJECTION INTRAVENOUS at 08:17

## 2025-01-01 RX ADMIN — FUROSEMIDE 20 MG: 20 TABLET ORAL at 08:48

## 2025-01-01 RX ADMIN — Medication 1000 UNITS: at 08:35

## 2025-01-01 RX ADMIN — METOPROLOL TARTRATE 12.5 MG: 25 TABLET, FILM COATED ORAL at 12:46

## 2025-01-01 RX ADMIN — SODIUM CHLORIDE, PRESERVATIVE FREE 10 ML: 5 INJECTION INTRAVENOUS at 07:44

## 2025-01-01 RX ADMIN — MIDAZOLAM 2 MG: 1 INJECTION INTRAMUSCULAR; INTRAVENOUS at 01:30

## 2025-01-01 RX ADMIN — RIFAXIMIN 400 MG: 200 TABLET ORAL at 20:02

## 2025-01-01 RX ADMIN — WARFARIN SODIUM 1 MG: 1 TABLET ORAL at 18:33

## 2025-01-01 RX ADMIN — FUROSEMIDE 20 MG: 20 TABLET ORAL at 18:08

## 2025-01-01 RX ADMIN — IPRATROPIUM BROMIDE AND ALBUTEROL SULFATE 1 DOSE: 2.5; .5 SOLUTION RESPIRATORY (INHALATION) at 16:06

## 2025-01-01 RX ADMIN — PANTOPRAZOLE SODIUM 40 MG: 40 INJECTION, POWDER, FOR SOLUTION INTRAVENOUS at 21:29

## 2025-01-01 RX ADMIN — METOPROLOL TARTRATE 12.5 MG: 25 TABLET, FILM COATED ORAL at 09:43

## 2025-01-01 RX ADMIN — PETROLATUM: 420 OINTMENT TOPICAL at 21:12

## 2025-01-01 RX ADMIN — HEPARIN SODIUM 12 UNITS/KG/HR: 10000 INJECTION, SOLUTION INTRAVENOUS at 15:07

## 2025-01-01 RX ADMIN — ALBUMIN (HUMAN) 25 G: 0.25 INJECTION, SOLUTION INTRAVENOUS at 10:59

## 2025-01-01 RX ADMIN — POTASSIUM PHOSPHATE, MONOBASIC AND POTASSIUM PHOSPHATE, DIBASIC 15 MMOL: 224; 236 INJECTION, SOLUTION, CONCENTRATE INTRAVENOUS at 07:47

## 2025-01-01 RX ADMIN — PANTOPRAZOLE SODIUM 40 MG: 40 INJECTION, POWDER, FOR SOLUTION INTRAVENOUS at 20:02

## 2025-01-01 RX ADMIN — MIDAZOLAM 2 MG: 1 INJECTION INTRAMUSCULAR; INTRAVENOUS at 03:15

## 2025-01-01 RX ADMIN — Medication 6 MG: at 21:38

## 2025-01-01 RX ADMIN — MORPHINE SULFATE 2 MG: 2 INJECTION, SOLUTION INTRAMUSCULAR; INTRAVENOUS at 14:19

## 2025-01-01 RX ADMIN — RIFAXIMIN 400 MG: 200 TABLET ORAL at 21:03

## 2025-01-01 RX ADMIN — Medication 5 MCG/MIN: at 11:54

## 2025-01-01 RX ADMIN — MIDAZOLAM HYDROCHLORIDE 2 MG: 2 INJECTION, SOLUTION INTRAMUSCULAR; INTRAVENOUS at 17:46

## 2025-01-01 RX ADMIN — THIAMINE HYDROCHLORIDE 100 MG: 100 INJECTION, SOLUTION INTRAMUSCULAR; INTRAVENOUS at 07:56

## 2025-01-01 RX ADMIN — SODIUM CHLORIDE, PRESERVATIVE FREE 10 ML: 5 INJECTION INTRAVENOUS at 08:17

## 2025-01-01 RX ADMIN — IPRATROPIUM BROMIDE AND ALBUTEROL SULFATE 1 DOSE: 2.5; .5 SOLUTION RESPIRATORY (INHALATION) at 07:49

## 2025-01-01 RX ADMIN — ATORVASTATIN CALCIUM 20 MG: 20 TABLET, FILM COATED ORAL at 20:41

## 2025-01-01 RX ADMIN — BUDESONIDE 500 MCG: 0.5 INHALANT RESPIRATORY (INHALATION) at 21:06

## 2025-01-01 RX ADMIN — LACOSAMIDE 50 MG: 10 INJECTION INTRAVENOUS at 21:39

## 2025-01-01 RX ADMIN — PANTOPRAZOLE SODIUM 40 MG: 40 INJECTION, POWDER, FOR SOLUTION INTRAVENOUS at 10:20

## 2025-01-01 RX ADMIN — LACTULOSE 20 G: 10 SOLUTION ORAL at 08:06

## 2025-01-01 RX ADMIN — Medication 1000 UNITS: at 10:27

## 2025-01-01 RX ADMIN — CHLORHEXIDINE GLUCONATE, 0.12% ORAL RINSE 15 ML: 1.2 SOLUTION DENTAL at 08:45

## 2025-01-01 RX ADMIN — RIFAXIMIN 550 MG: 550 TABLET ORAL at 21:11

## 2025-01-01 RX ADMIN — LEVETIRACETAM 250 MG: 100 INJECTION INTRAVENOUS at 08:50

## 2025-01-01 RX ADMIN — LACTULOSE 20 G: 10 SOLUTION ORAL at 21:21

## 2025-01-01 RX ADMIN — SODIUM CHLORIDE: 0.9 INJECTION, SOLUTION INTRAVENOUS at 21:50

## 2025-01-01 RX ADMIN — LACOSAMIDE 50 MG: 10 INJECTION INTRAVENOUS at 11:05

## 2025-01-01 RX ADMIN — ALBUMIN (HUMAN) 25 G: 0.25 INJECTION, SOLUTION INTRAVENOUS at 03:02

## 2025-01-01 RX ADMIN — Medication 125 MCG/HR: at 16:42

## 2025-01-01 RX ADMIN — LORAZEPAM 1 MG: 2 INJECTION INTRAMUSCULAR; INTRAVENOUS at 15:36

## 2025-01-01 RX ADMIN — ATORVASTATIN CALCIUM 20 MG: 20 TABLET, FILM COATED ORAL at 21:26

## 2025-01-01 RX ADMIN — LACTULOSE 20 G: 10 SOLUTION ORAL at 15:55

## 2025-01-01 RX ADMIN — MIDODRINE HYDROCHLORIDE 5 MG: 5 TABLET ORAL at 11:24

## 2025-01-01 RX ADMIN — THIAMINE HYDROCHLORIDE 100 MG: 100 INJECTION, SOLUTION INTRAMUSCULAR; INTRAVENOUS at 09:10

## 2025-01-01 RX ADMIN — MULTIVITAMIN TABLET 1 TABLET: TABLET at 10:44

## 2025-01-01 RX ADMIN — IPRATROPIUM BROMIDE AND ALBUTEROL SULFATE 1 DOSE: 2.5; .5 SOLUTION RESPIRATORY (INHALATION) at 08:58

## 2025-01-01 RX ADMIN — LACTULOSE 20 G: 10 SOLUTION ORAL at 09:10

## 2025-01-01 RX ADMIN — CHLORHEXIDINE GLUCONATE, 0.12% ORAL RINSE 15 ML: 1.2 SOLUTION DENTAL at 10:14

## 2025-01-01 RX ADMIN — LEVETIRACETAM 500 MG: 100 INJECTION INTRAVENOUS at 10:45

## 2025-01-01 RX ADMIN — SODIUM CHLORIDE 125 MG: 9 INJECTION, SOLUTION INTRAVENOUS at 15:55

## 2025-01-01 RX ADMIN — BUDESONIDE 500 MCG: 0.5 INHALANT RESPIRATORY (INHALATION) at 20:35

## 2025-01-01 RX ADMIN — ENOXAPARIN SODIUM 120 MG: 150 INJECTION SUBCUTANEOUS at 00:10

## 2025-01-01 RX ADMIN — LACTULOSE 20 G: 10 SOLUTION ORAL at 21:01

## 2025-01-01 RX ADMIN — SODIUM CHLORIDE 500 MG: 9 INJECTION, SOLUTION INTRAVENOUS at 15:40

## 2025-01-01 RX ADMIN — METOCLOPRAMIDE 5 MG: 5 INJECTION, SOLUTION INTRAMUSCULAR; INTRAVENOUS at 15:38

## 2025-01-01 RX ADMIN — WARFARIN SODIUM 1 MG: 1 TABLET ORAL at 18:28

## 2025-01-01 RX ADMIN — IPRATROPIUM BROMIDE AND ALBUTEROL SULFATE 1 DOSE: 2.5; .5 SOLUTION RESPIRATORY (INHALATION) at 20:24

## 2025-01-01 RX ADMIN — DEXMEDETOMIDINE 0.6 MCG/KG/HR: 100 INJECTION, SOLUTION INTRAVENOUS at 21:46

## 2025-01-01 RX ADMIN — PROPOFOL 200 MG: 10 INJECTION, EMULSION INTRAVENOUS at 15:04

## 2025-01-01 RX ADMIN — FOLIC ACID 1 MG: 1 TABLET ORAL at 09:42

## 2025-01-01 RX ADMIN — VANCOMYCIN HYDROCHLORIDE 1750 MG: 10 INJECTION, POWDER, LYOPHILIZED, FOR SOLUTION INTRAVENOUS at 10:50

## 2025-01-01 RX ADMIN — CHLORHEXIDINE GLUCONATE, 0.12% ORAL RINSE 15 ML: 1.2 SOLUTION DENTAL at 08:07

## 2025-01-01 RX ADMIN — PYRIDOXINE HCL TAB 50 MG 50 MG: 50 TAB at 10:26

## 2025-01-01 RX ADMIN — PANTOPRAZOLE SODIUM 40 MG: 40 INJECTION, POWDER, FOR SOLUTION INTRAVENOUS at 09:43

## 2025-01-01 RX ADMIN — PANTOPRAZOLE SODIUM 40 MG: 40 INJECTION, POWDER, FOR SOLUTION INTRAVENOUS at 08:44

## 2025-01-01 RX ADMIN — PANTOPRAZOLE SODIUM 40 MG: 40 INJECTION, POWDER, FOR SOLUTION INTRAVENOUS at 22:05

## 2025-01-01 RX ADMIN — LORAZEPAM 1 MG: 2 INJECTION INTRAMUSCULAR; INTRAVENOUS at 02:04

## 2025-01-01 RX ADMIN — PETROLATUM: 420 OINTMENT TOPICAL at 08:36

## 2025-01-01 RX ADMIN — HEPARIN SODIUM 12.7 UNITS/KG/HR: 10000 INJECTION, SOLUTION INTRAVENOUS at 02:53

## 2025-01-01 RX ADMIN — BUMETANIDE 1 MG: 0.25 INJECTION INTRAMUSCULAR; INTRAVENOUS at 18:49

## 2025-01-01 RX ADMIN — RIFAXIMIN 400 MG: 200 TABLET ORAL at 13:22

## 2025-01-01 RX ADMIN — CEFEPIME 2000 MG: 2 INJECTION, POWDER, FOR SOLUTION INTRAVENOUS at 18:48

## 2025-01-01 RX ADMIN — MIDODRINE HYDROCHLORIDE 5 MG: 5 TABLET ORAL at 11:50

## 2025-01-01 RX ADMIN — WARFARIN SODIUM 0.5 MG: 1 TABLET ORAL at 18:02

## 2025-01-01 RX ADMIN — ALBUMIN (HUMAN) 25 G: 0.25 INJECTION, SOLUTION INTRAVENOUS at 19:45

## 2025-01-01 RX ADMIN — FOLIC ACID 1 MG: 1 TABLET ORAL at 08:42

## 2025-01-01 RX ADMIN — FOLIC ACID 1 MG: 1 TABLET ORAL at 08:44

## 2025-01-01 RX ADMIN — LEVETIRACETAM 250 MG: 100 INJECTION INTRAVENOUS at 09:06

## 2025-01-01 RX ADMIN — ACETYLCYSTEINE 600 MG: 200 INHALANT RESPIRATORY (INHALATION) at 09:54

## 2025-01-01 RX ADMIN — LEVETIRACETAM 500 MG: 100 INJECTION INTRAVENOUS at 11:11

## 2025-01-01 RX ADMIN — THIAMINE HYDROCHLORIDE 500 MG: 100 INJECTION, SOLUTION INTRAMUSCULAR; INTRAVENOUS at 08:37

## 2025-01-01 RX ADMIN — FOLIC ACID 1 MG: 1 TABLET ORAL at 08:53

## 2025-01-01 RX ADMIN — BUDESONIDE 500 MCG: 0.5 INHALANT RESPIRATORY (INHALATION) at 20:29

## 2025-01-01 RX ADMIN — SODIUM CHLORIDE, PRESERVATIVE FREE 10 ML: 5 INJECTION INTRAVENOUS at 08:08

## 2025-01-01 RX ADMIN — BUSPIRONE HYDROCHLORIDE 5 MG: 5 TABLET ORAL at 09:42

## 2025-01-01 RX ADMIN — THIAMINE HYDROCHLORIDE 100 MG: 100 INJECTION, SOLUTION INTRAMUSCULAR; INTRAVENOUS at 08:51

## 2025-01-01 RX ADMIN — SODIUM BICARBONATE 50 MEQ: 84 INJECTION INTRAVENOUS at 02:51

## 2025-01-01 RX ADMIN — THIAMINE HYDROCHLORIDE 100 MG: 100 INJECTION, SOLUTION INTRAMUSCULAR; INTRAVENOUS at 08:48

## 2025-01-01 RX ADMIN — HEPARIN SODIUM 2000 UNITS: 1000 INJECTION INTRAVENOUS; SUBCUTANEOUS at 07:26

## 2025-01-01 RX ADMIN — SODIUM CHLORIDE 500 ML: 0.9 INJECTION, SOLUTION INTRAVENOUS at 17:05

## 2025-01-01 RX ADMIN — DEXTROSE: 5 SOLUTION INTRAVENOUS at 15:07

## 2025-01-01 RX ADMIN — HEPARIN SODIUM 12 UNITS/KG/HR: 10000 INJECTION, SOLUTION INTRAVENOUS at 09:05

## 2025-01-01 RX ADMIN — MICAFUNGIN SODIUM 100 MG: 100 INJECTION, POWDER, LYOPHILIZED, FOR SOLUTION INTRAVENOUS at 08:07

## 2025-01-01 RX ADMIN — PANTOPRAZOLE SODIUM 40 MG: 40 INJECTION, POWDER, FOR SOLUTION INTRAVENOUS at 07:57

## 2025-01-01 RX ADMIN — SODIUM CHLORIDE, PRESERVATIVE FREE 10 ML: 5 INJECTION INTRAVENOUS at 08:18

## 2025-01-01 RX ADMIN — MIDODRINE HYDROCHLORIDE 5 MG: 5 TABLET ORAL at 18:00

## 2025-01-01 RX ADMIN — ARFORMOTEROL TARTRATE 15 MCG: 15 SOLUTION RESPIRATORY (INHALATION) at 21:10

## 2025-01-01 RX ADMIN — LACTULOSE 20 G: 10 SOLUTION ORAL at 12:34

## 2025-01-01 RX ADMIN — THIAMINE HYDROCHLORIDE 500 MG: 100 INJECTION, SOLUTION INTRAMUSCULAR; INTRAVENOUS at 12:33

## 2025-01-01 RX ADMIN — BUMETANIDE 0.2 MG/HR: 0.25 INJECTION INTRAMUSCULAR; INTRAVENOUS at 23:00

## 2025-01-01 RX ADMIN — FOLIC ACID 1 MG: 5 INJECTION, SOLUTION INTRAMUSCULAR; INTRAVENOUS; SUBCUTANEOUS at 10:23

## 2025-01-01 RX ADMIN — ATORVASTATIN CALCIUM 20 MG: 20 TABLET, FILM COATED ORAL at 21:16

## 2025-01-01 RX ADMIN — Medication 125 MCG/HR: at 01:24

## 2025-01-01 RX ADMIN — LEVETIRACETAM 250 MG: 100 INJECTION INTRAVENOUS at 21:29

## 2025-01-01 RX ADMIN — BUMETANIDE 1 MG: 0.25 INJECTION INTRAMUSCULAR; INTRAVENOUS at 09:41

## 2025-01-01 RX ADMIN — PANTOPRAZOLE SODIUM 40 MG: 40 INJECTION, POWDER, FOR SOLUTION INTRAVENOUS at 08:35

## 2025-01-01 RX ADMIN — ARFORMOTEROL TARTRATE 15 MCG: 15 SOLUTION RESPIRATORY (INHALATION) at 08:33

## 2025-01-01 RX ADMIN — POTASSIUM CHLORIDE 20 MEQ: 29.8 INJECTION, SOLUTION INTRAVENOUS at 06:23

## 2025-01-01 RX ADMIN — Medication 10 MG: at 21:11

## 2025-01-01 RX ADMIN — LEVETIRACETAM 250 MG: 100 INJECTION INTRAVENOUS at 10:24

## 2025-01-01 RX ADMIN — HEPARIN SODIUM 8 UNITS/KG/HR: 10000 INJECTION, SOLUTION INTRAVENOUS at 01:42

## 2025-01-01 RX ADMIN — LACTULOSE 1000 ML: 10 SOLUTION ORAL; RECTAL at 20:20

## 2025-01-01 RX ADMIN — WATER 2000 MG: 1 INJECTION INTRAMUSCULAR; INTRAVENOUS; SUBCUTANEOUS at 15:36

## 2025-01-01 RX ADMIN — BUSPIRONE HYDROCHLORIDE 5 MG: 5 TABLET ORAL at 21:21

## 2025-01-01 RX ADMIN — BUSPIRONE HYDROCHLORIDE 5 MG: 5 TABLET ORAL at 21:03

## 2025-01-01 RX ADMIN — LEVETIRACETAM 500 MG: 100 INJECTION INTRAVENOUS at 13:50

## 2025-01-01 RX ADMIN — WARFARIN SODIUM 1.5 MG: 3 TABLET ORAL at 10:38

## 2025-01-01 RX ADMIN — BUDESONIDE 500 MCG: 0.5 INHALANT RESPIRATORY (INHALATION) at 20:43

## 2025-01-01 RX ADMIN — BUMETANIDE 1 MG/HR: 0.25 INJECTION INTRAMUSCULAR; INTRAVENOUS at 17:09

## 2025-01-01 RX ADMIN — ATORVASTATIN CALCIUM 20 MG: 20 TABLET, FILM COATED ORAL at 21:27

## 2025-01-01 RX ADMIN — THIAMINE HYDROCHLORIDE 100 MG: 100 INJECTION, SOLUTION INTRAMUSCULAR; INTRAVENOUS at 09:39

## 2025-01-01 RX ADMIN — POTASSIUM BICARBONATE 40 MEQ: 782 TABLET, EFFERVESCENT ORAL at 12:47

## 2025-01-01 RX ADMIN — LEVETIRACETAM 500 MG: 100 INJECTION INTRAVENOUS at 23:59

## 2025-01-01 RX ADMIN — IPRATROPIUM BROMIDE AND ALBUTEROL SULFATE 1 DOSE: 2.5; .5 SOLUTION RESPIRATORY (INHALATION) at 20:37

## 2025-01-01 RX ADMIN — WARFARIN SODIUM 1 MG: 1 TABLET ORAL at 21:16

## 2025-01-01 RX ADMIN — PROPOFOL 100 MG: 10 INJECTION, EMULSION INTRAVENOUS at 15:15

## 2025-01-01 RX ADMIN — BUSPIRONE HYDROCHLORIDE 5 MG: 5 TABLET ORAL at 08:18

## 2025-01-01 RX ADMIN — LACTULOSE 20 G: 10 SOLUTION ORAL at 14:42

## 2025-01-01 RX ADMIN — MEROPENEM 2000 MG: 2 INJECTION, POWDER, FOR SOLUTION INTRAVENOUS at 20:45

## 2025-01-01 RX ADMIN — SODIUM CHLORIDE, PRESERVATIVE FREE 10 ML: 5 INJECTION INTRAVENOUS at 08:51

## 2025-01-01 RX ADMIN — ARFORMOTEROL TARTRATE 15 MCG: 15 SOLUTION RESPIRATORY (INHALATION) at 20:31

## 2025-01-01 RX ADMIN — THIAMINE HYDROCHLORIDE 250 MG: 100 INJECTION, SOLUTION INTRAMUSCULAR; INTRAVENOUS at 08:46

## 2025-01-01 RX ADMIN — MIDODRINE HYDROCHLORIDE 5 MG: 5 TABLET ORAL at 18:02

## 2025-01-01 RX ADMIN — MIDODRINE HYDROCHLORIDE 5 MG: 5 TABLET ORAL at 08:06

## 2025-01-01 RX ADMIN — HEPARIN SODIUM 14.7 UNITS/KG/HR: 10000 INJECTION, SOLUTION INTRAVENOUS at 21:17

## 2025-01-01 RX ADMIN — RIFAXIMIN 400 MG: 200 TABLET ORAL at 14:52

## 2025-01-01 RX ADMIN — FLUMAZENIL 0.2 MG: 0.1 INJECTION, SOLUTION INTRAVENOUS at 16:35

## 2025-01-01 RX ADMIN — LEVETIRACETAM 250 MG: 100 INJECTION INTRAVENOUS at 21:36

## 2025-01-01 RX ADMIN — HEPARIN SODIUM 2000 UNITS: 1000 INJECTION INTRAVENOUS; SUBCUTANEOUS at 02:44

## 2025-01-01 RX ADMIN — FOLIC ACID 1 MG: 5 INJECTION, SOLUTION INTRAMUSCULAR; INTRAVENOUS; SUBCUTANEOUS at 11:05

## 2025-01-01 RX ADMIN — BUDESONIDE 500 MCG: 0.5 INHALANT RESPIRATORY (INHALATION) at 08:49

## 2025-01-01 RX ADMIN — RIFAXIMIN 400 MG: 200 TABLET ORAL at 21:08

## 2025-01-01 RX ADMIN — POTASSIUM CHLORIDE 40 MEQ: 29.8 INJECTION, SOLUTION INTRAVENOUS at 04:58

## 2025-01-01 RX ADMIN — IPRATROPIUM BROMIDE AND ALBUTEROL SULFATE 1 DOSE: 2.5; .5 SOLUTION RESPIRATORY (INHALATION) at 12:59

## 2025-01-01 RX ADMIN — ACETAMINOPHEN 650 MG: 325 TABLET ORAL at 22:15

## 2025-01-01 RX ADMIN — IPRATROPIUM BROMIDE AND ALBUTEROL SULFATE 1 DOSE: 2.5; .5 SOLUTION RESPIRATORY (INHALATION) at 11:00

## 2025-01-01 RX ADMIN — SODIUM CHLORIDE, PRESERVATIVE FREE 10 ML: 5 INJECTION INTRAVENOUS at 20:28

## 2025-01-01 RX ADMIN — PETROLATUM: 420 OINTMENT TOPICAL at 20:42

## 2025-01-01 RX ADMIN — MAGNESIUM SULFATE HEPTAHYDRATE 2000 MG: 40 INJECTION, SOLUTION INTRAVENOUS at 19:09

## 2025-01-01 RX ADMIN — POTASSIUM CHLORIDE 40 MEQ: 29.8 INJECTION, SOLUTION INTRAVENOUS at 06:47

## 2025-01-01 RX ADMIN — LEVETIRACETAM 500 MG: 100 INJECTION INTRAVENOUS at 22:15

## 2025-01-01 RX ADMIN — Medication 6 MG: at 22:06

## 2025-01-01 RX ADMIN — LEVETIRACETAM 250 MG: 100 INJECTION INTRAVENOUS at 23:10

## 2025-01-01 RX ADMIN — BUDESONIDE 500 MCG: 0.5 INHALANT RESPIRATORY (INHALATION) at 20:24

## 2025-01-01 RX ADMIN — FOLIC ACID 1 MG: 1 TABLET ORAL at 08:18

## 2025-01-01 RX ADMIN — MULTIVITAMIN TABLET 1 TABLET: TABLET at 08:27

## 2025-01-01 RX ADMIN — ARFORMOTEROL TARTRATE 15 MCG: 15 SOLUTION RESPIRATORY (INHALATION) at 07:49

## 2025-01-01 RX ADMIN — IPRATROPIUM BROMIDE AND ALBUTEROL SULFATE 1 DOSE: 2.5; .5 SOLUTION RESPIRATORY (INHALATION) at 16:07

## 2025-01-01 RX ADMIN — PYRIDOXINE HCL TAB 50 MG 50 MG: 50 TAB at 10:44

## 2025-01-01 RX ADMIN — METOCLOPRAMIDE 5 MG: 5 INJECTION, SOLUTION INTRAMUSCULAR; INTRAVENOUS at 20:34

## 2025-01-01 RX ADMIN — RIFAXIMIN 550 MG: 550 TABLET ORAL at 22:07

## 2025-01-01 RX ADMIN — ATORVASTATIN CALCIUM 20 MG: 20 TABLET, FILM COATED ORAL at 21:38

## 2025-01-01 RX ADMIN — BUDESONIDE 500 MCG: 0.5 INHALANT RESPIRATORY (INHALATION) at 21:10

## 2025-01-01 RX ADMIN — DEXTROSE: 5 SOLUTION INTRAVENOUS at 18:46

## 2025-01-01 RX ADMIN — BUDESONIDE 500 MCG: 0.5 INHALANT RESPIRATORY (INHALATION) at 08:55

## 2025-01-01 RX ADMIN — Medication 6 MG: at 21:35

## 2025-01-01 RX ADMIN — ALBUMIN (HUMAN) 25 G: 0.25 INJECTION, SOLUTION INTRAVENOUS at 02:55

## 2025-01-01 RX ADMIN — PANTOPRAZOLE SODIUM 40 MG: 40 INJECTION, POWDER, FOR SOLUTION INTRAVENOUS at 21:57

## 2025-01-01 RX ADMIN — PANTOPRAZOLE SODIUM 40 MG: 40 INJECTION, POWDER, FOR SOLUTION INTRAVENOUS at 08:30

## 2025-01-01 RX ADMIN — SODIUM CHLORIDE, PRESERVATIVE FREE 10 ML: 5 INJECTION INTRAVENOUS at 09:05

## 2025-01-01 RX ADMIN — SODIUM CHLORIDE, PRESERVATIVE FREE 10 ML: 5 INJECTION INTRAVENOUS at 10:15

## 2025-01-01 RX ADMIN — BUSPIRONE HYDROCHLORIDE 5 MG: 5 TABLET ORAL at 20:02

## 2025-01-01 RX ADMIN — PANTOPRAZOLE SODIUM 40 MG: 40 INJECTION, POWDER, FOR SOLUTION INTRAVENOUS at 08:37

## 2025-01-01 RX ADMIN — MICAFUNGIN SODIUM 100 MG: 100 INJECTION, POWDER, LYOPHILIZED, FOR SOLUTION INTRAVENOUS at 08:53

## 2025-01-01 RX ADMIN — FOLIC ACID 1 MG: 1 TABLET ORAL at 08:06

## 2025-01-01 RX ADMIN — PANTOPRAZOLE SODIUM 40 MG: 40 INJECTION, POWDER, FOR SOLUTION INTRAVENOUS at 10:25

## 2025-01-01 RX ADMIN — SENNOSIDES AND DOCUSATE SODIUM 2 TABLET: 8.6; 5 TABLET ORAL at 20:59

## 2025-01-01 RX ADMIN — RIFAXIMIN 400 MG: 200 TABLET ORAL at 08:26

## 2025-01-01 RX ADMIN — FOLIC ACID 1 MG: 5 INJECTION, SOLUTION INTRAMUSCULAR; INTRAVENOUS; SUBCUTANEOUS at 08:16

## 2025-01-01 RX ADMIN — BUMETANIDE 1 MG: 1 TABLET ORAL at 08:06

## 2025-01-01 RX ADMIN — DOXYCYCLINE 100 MG: 100 INJECTION, POWDER, LYOPHILIZED, FOR SOLUTION INTRAVENOUS at 04:42

## 2025-01-01 RX ADMIN — LACTULOSE 20 G: 10 SOLUTION ORAL at 16:05

## 2025-01-01 RX ADMIN — CHLORHEXIDINE GLUCONATE, 0.12% ORAL RINSE 15 ML: 1.2 SOLUTION DENTAL at 20:37

## 2025-01-01 RX ADMIN — ARFORMOTEROL TARTRATE 15 MCG: 15 SOLUTION RESPIRATORY (INHALATION) at 08:07

## 2025-01-01 RX ADMIN — METOPROLOL TARTRATE 12.5 MG: 25 TABLET, FILM COATED ORAL at 21:17

## 2025-01-01 RX ADMIN — PANTOPRAZOLE SODIUM 40 MG: 40 INJECTION, POWDER, FOR SOLUTION INTRAVENOUS at 21:15

## 2025-01-01 RX ADMIN — HYDROCORTISONE SODIUM SUCCINATE 100 MG: 100 INJECTION INTRAMUSCULAR; INTRAVENOUS at 04:25

## 2025-01-01 RX ADMIN — FUROSEMIDE 20 MG: 20 TABLET ORAL at 17:32

## 2025-01-01 RX ADMIN — CHLORHEXIDINE GLUCONATE, 0.12% ORAL RINSE 15 ML: 1.2 SOLUTION DENTAL at 09:05

## 2025-01-01 RX ADMIN — WARFARIN SODIUM 1.5 MG: 3 TABLET ORAL at 21:03

## 2025-01-01 RX ADMIN — SODIUM CHLORIDE, PRESERVATIVE FREE 10 ML: 5 INJECTION INTRAVENOUS at 08:38

## 2025-01-01 RX ADMIN — CHLORHEXIDINE GLUCONATE, 0.12% ORAL RINSE 15 ML: 1.2 SOLUTION DENTAL at 21:15

## 2025-01-01 RX ADMIN — PETROLATUM: 420 OINTMENT TOPICAL at 21:27

## 2025-01-01 RX ADMIN — PANTOPRAZOLE SODIUM 40 MG: 40 INJECTION, POWDER, FOR SOLUTION INTRAVENOUS at 17:30

## 2025-01-01 RX ADMIN — IPRATROPIUM BROMIDE AND ALBUTEROL SULFATE 1 DOSE: 2.5; .5 SOLUTION RESPIRATORY (INHALATION) at 16:46

## 2025-01-01 RX ADMIN — LEVETIRACETAM 250 MG: 100 INJECTION INTRAVENOUS at 09:05

## 2025-01-01 RX ADMIN — PANTOPRAZOLE SODIUM 40 MG: 40 INJECTION, POWDER, FOR SOLUTION INTRAVENOUS at 21:41

## 2025-01-01 RX ADMIN — PANTOPRAZOLE SODIUM 40 MG: 40 INJECTION, POWDER, FOR SOLUTION INTRAVENOUS at 19:42

## 2025-01-01 RX ADMIN — LEVETIRACETAM 500 MG: 100 INJECTION INTRAVENOUS at 22:06

## 2025-01-01 RX ADMIN — HYDROCORTISONE SODIUM SUCCINATE 50 MG: 100 INJECTION INTRAMUSCULAR; INTRAVENOUS at 05:13

## 2025-01-01 RX ADMIN — Medication 1000 UNITS: at 08:51

## 2025-01-01 RX ADMIN — IPRATROPIUM BROMIDE AND ALBUTEROL SULFATE 1 DOSE: 2.5; .5 SOLUTION RESPIRATORY (INHALATION) at 08:54

## 2025-01-01 RX ADMIN — ARFORMOTEROL TARTRATE 15 MCG: 15 SOLUTION RESPIRATORY (INHALATION) at 07:56

## 2025-01-01 RX ADMIN — PANTOPRAZOLE SODIUM 40 MG: 40 INJECTION, POWDER, FOR SOLUTION INTRAVENOUS at 21:38

## 2025-01-01 RX ADMIN — ARFORMOTEROL TARTRATE 15 MCG: 15 SOLUTION RESPIRATORY (INHALATION) at 20:08

## 2025-01-01 RX ADMIN — Medication 10 MG: at 21:13

## 2025-01-01 RX ADMIN — FOLIC ACID 1 MG: 1 TABLET ORAL at 10:13

## 2025-01-01 RX ADMIN — BUMETANIDE 1 MG: 0.25 INJECTION INTRAMUSCULAR; INTRAVENOUS at 09:42

## 2025-01-01 RX ADMIN — METOPROLOL TARTRATE 12.5 MG: 25 TABLET, FILM COATED ORAL at 21:34

## 2025-01-01 RX ADMIN — IPRATROPIUM BROMIDE AND ALBUTEROL SULFATE 1 DOSE: 2.5; .5 SOLUTION RESPIRATORY (INHALATION) at 11:32

## 2025-01-01 RX ADMIN — PANTOPRAZOLE SODIUM 40 MG: 40 INJECTION, POWDER, FOR SOLUTION INTRAVENOUS at 07:44

## 2025-01-01 RX ADMIN — BUSPIRONE HYDROCHLORIDE 5 MG: 5 TABLET ORAL at 08:27

## 2025-01-01 RX ADMIN — LEVETIRACETAM 500 MG: 100 INJECTION INTRAVENOUS at 22:40

## 2025-01-01 RX ADMIN — RIFAXIMIN 400 MG: 200 TABLET ORAL at 08:07

## 2025-01-01 RX ADMIN — THIAMINE HYDROCHLORIDE 100 MG: 100 INJECTION, SOLUTION INTRAMUSCULAR; INTRAVENOUS at 10:26

## 2025-01-01 RX ADMIN — ARFORMOTEROL TARTRATE 15 MCG: 15 SOLUTION RESPIRATORY (INHALATION) at 20:24

## 2025-01-01 RX ADMIN — DEXTROSE MONOHYDRATE 125 ML: 10 INJECTION, SOLUTION INTRAVENOUS at 23:57

## 2025-01-01 RX ADMIN — PYRIDOXINE HCL TAB 50 MG 50 MG: 50 TAB at 09:11

## 2025-01-01 RX ADMIN — IPRATROPIUM BROMIDE AND ALBUTEROL SULFATE 1 DOSE: 2.5; .5 SOLUTION RESPIRATORY (INHALATION) at 17:01

## 2025-01-01 RX ADMIN — ACETYLCYSTEINE 600 MG: 200 INHALANT RESPIRATORY (INHALATION) at 21:10

## 2025-01-01 RX ADMIN — ACETYLCYSTEINE 600 MG: 200 INHALANT RESPIRATORY (INHALATION) at 09:04

## 2025-01-01 RX ADMIN — WARFARIN SODIUM 1.5 MG: 3 TABLET ORAL at 17:27

## 2025-01-01 RX ADMIN — ACETYLCYSTEINE 600 MG: 200 INHALANT RESPIRATORY (INHALATION) at 09:53

## 2025-01-01 RX ADMIN — MULTIVIT AND MINERALS-FERROUS GLUCONATE 9 MG IRON/15 ML ORAL LIQUID 15 ML: at 09:44

## 2025-01-01 RX ADMIN — RIFAXIMIN 400 MG: 200 TABLET ORAL at 10:13

## 2025-01-01 RX ADMIN — DEXTROSE: 5 SOLUTION INTRAVENOUS at 11:47

## 2025-01-01 RX ADMIN — ARFORMOTEROL TARTRATE 15 MCG: 15 SOLUTION RESPIRATORY (INHALATION) at 08:55

## 2025-01-01 RX ADMIN — PANTOPRAZOLE SODIUM 40 MG: 40 INJECTION, POWDER, FOR SOLUTION INTRAVENOUS at 08:16

## 2025-01-01 RX ADMIN — Medication 75 MCG/HR: at 18:48

## 2025-01-01 RX ADMIN — Medication 1000 UNITS: at 08:45

## 2025-01-01 ASSESSMENT — PULMONARY FUNCTION TESTS
PIF_VALUE: 26
PIF_VALUE: 27
PIF_VALUE: 27
PIF_VALUE: 24
PIF_VALUE: 25
PIF_VALUE: 14
PIF_VALUE: 26
PIF_VALUE: 29
PIF_VALUE: 34
PIF_VALUE: 27
PIF_VALUE: 35
PIF_VALUE: 28
PIF_VALUE: 24
PIF_VALUE: 27
PIF_VALUE: 31
PIF_VALUE: 27
PIF_VALUE: 26
PIF_VALUE: 28
PIF_VALUE: 27
PIF_VALUE: 20
PIF_VALUE: 27
PIF_VALUE: 33
PIF_VALUE: 25
PIF_VALUE: 27
PIF_VALUE: 29
PIF_VALUE: 26
PIF_VALUE: 30
PIF_VALUE: 24
PIF_VALUE: 26
PIF_VALUE: 30
PIF_VALUE: 25
PIF_VALUE: 24
PIF_VALUE: 27
PIF_VALUE: 33
PIF_VALUE: 24
PIF_VALUE: 26
PIF_VALUE: 26
PIF_VALUE: 33
PIF_VALUE: 25
PIF_VALUE: 25
PIF_VALUE: 28
PIF_VALUE: 27
PIF_VALUE: 28
PIF_VALUE: 27
PIF_VALUE: 28
PIF_VALUE: 31
PIF_VALUE: 32
PIF_VALUE: 14
PIF_VALUE: 28
PIF_VALUE: 26
PIF_VALUE: 33
PIF_VALUE: 25
PIF_VALUE: 26
PIF_VALUE: 24
PIF_VALUE: 40
PIF_VALUE: 14
PIF_VALUE: 14
PIF_VALUE: 25
PIF_VALUE: 28
PIF_VALUE: 27
PIF_VALUE: 27
PIF_VALUE: 26
PIF_VALUE: 29
PIF_VALUE: 28
PIF_VALUE: 30
PIF_VALUE: 26
PIF_VALUE: 28
PIF_VALUE: 26
PIF_VALUE: 12
PIF_VALUE: 14
PIF_VALUE: 27
PIF_VALUE: 26
PIF_VALUE: 26
PIF_VALUE: 25
PIF_VALUE: 28
PIF_VALUE: 27
PIF_VALUE: 24
PIF_VALUE: 28
PIF_VALUE: 17
PIF_VALUE: 29
PIF_VALUE: 29
PIF_VALUE: 26
PIF_VALUE: 30
PIF_VALUE: 26
PIF_VALUE: 30
PIF_VALUE: 30
PIF_VALUE: 28
PIF_VALUE: 29
PIF_VALUE: 25
PIF_VALUE: 36
PIF_VALUE: 14
PIF_VALUE: 28
PIF_VALUE: 27
PIF_VALUE: 32
PIF_VALUE: 26
PIF_VALUE: 26
PIF_VALUE: 29
PIF_VALUE: 26
PIF_VALUE: 26
PIF_VALUE: 14
PIF_VALUE: 11
PIF_VALUE: 29
PIF_VALUE: 30
PIF_VALUE: 23
PIF_VALUE: 26
PIF_VALUE: 28
PIF_VALUE: 24
PIF_VALUE: 31
PIF_VALUE: 20
PIF_VALUE: 27
PIF_VALUE: 26
PIF_VALUE: 29
PIF_VALUE: 25
PIF_VALUE: 30
PIF_VALUE: 27
PIF_VALUE: 30
PIF_VALUE: 31
PIF_VALUE: 25
PIF_VALUE: 35
PIF_VALUE: 27
PIF_VALUE: 28
PIF_VALUE: 30
PIF_VALUE: 22
PIF_VALUE: 27
PIF_VALUE: 32
PIF_VALUE: 25
PIF_VALUE: 24
PIF_VALUE: 25
PIF_VALUE: 28
PIF_VALUE: 25
PIF_VALUE: 25
PIF_VALUE: 26
PIF_VALUE: 30
PIF_VALUE: 20
PIF_VALUE: 29
PIF_VALUE: 25
PIF_VALUE: 31
PIF_VALUE: 24
PIF_VALUE: 26
PIF_VALUE: 29
PIF_VALUE: 29
PIF_VALUE: 30
PIF_VALUE: 25
PIF_VALUE: 25
PIF_VALUE: 27
PIF_VALUE: 26
PIF_VALUE: 23
PIF_VALUE: 28
PIF_VALUE: 31
PIF_VALUE: 26
PIF_VALUE: 30
PIF_VALUE: 25
PIF_VALUE: 31
PIF_VALUE: 27
PIF_VALUE: 26
PIF_VALUE: 27
PIF_VALUE: 25
PIF_VALUE: 11
PIF_VALUE: 29
PIF_VALUE: 30
PIF_VALUE: 25
PIF_VALUE: 28
PIF_VALUE: 25
PIF_VALUE: 26
PIF_VALUE: 25
PIF_VALUE: 27
PIF_VALUE: 28
PIF_VALUE: 28
PIF_VALUE: 37
PIF_VALUE: 24
PIF_VALUE: 28
PIF_VALUE: 29
PIF_VALUE: 11
PIF_VALUE: 28
PIF_VALUE: 33
PIF_VALUE: 34
PIF_VALUE: 25
PIF_VALUE: 27
PIF_VALUE: 27
PIF_VALUE: 31
PIF_VALUE: 16
PIF_VALUE: 25
PIF_VALUE: 29
PIF_VALUE: 27
PIF_VALUE: 28
PIF_VALUE: 28
PIF_VALUE: 11
PIF_VALUE: 25
PIF_VALUE: 25
PIF_VALUE: 24
PIF_VALUE: 29
PIF_VALUE: 30
PIF_VALUE: 24
PIF_VALUE: 30
PIF_VALUE: 26
PIF_VALUE: 30
PIF_VALUE: 25
PIF_VALUE: 30
PIF_VALUE: 28
PIF_VALUE: 26
PIF_VALUE: 32
PIF_VALUE: 27
PIF_VALUE: 31
PIF_VALUE: 28
PIF_VALUE: 35
PIF_VALUE: 26
PIF_VALUE: 26
PIF_VALUE: 28
PIF_VALUE: 26
PIF_VALUE: 28
PIF_VALUE: 11
PIF_VALUE: 28
PIF_VALUE: 28
PIF_VALUE: 27
PIF_VALUE: 24
PIF_VALUE: 23
PIF_VALUE: 27
PIF_VALUE: 26
PIF_VALUE: 26
PIF_VALUE: 24
PIF_VALUE: 28
PIF_VALUE: 24
PIF_VALUE: 41
PIF_VALUE: 24
PIF_VALUE: 12
PIF_VALUE: 30
PIF_VALUE: 26
PIF_VALUE: 21
PIF_VALUE: 27
PIF_VALUE: 25
PIF_VALUE: 29
PIF_VALUE: 28
PIF_VALUE: 33
PIF_VALUE: 25
PIF_VALUE: 25
PIF_VALUE: 30
PIF_VALUE: 14
PIF_VALUE: 26
PIF_VALUE: 27
PIF_VALUE: 28
PIF_VALUE: 14
PIF_VALUE: 25
PIF_VALUE: 26
PIF_VALUE: 28
PIF_VALUE: 27
PIF_VALUE: 31
PIF_VALUE: 28
PIF_VALUE: 31
PIF_VALUE: 27
PIF_VALUE: 30
PIF_VALUE: 24
PIF_VALUE: 25
PIF_VALUE: 26
PIF_VALUE: 25
PIF_VALUE: 27
PIF_VALUE: 26
PIF_VALUE: 28
PIF_VALUE: 27
PIF_VALUE: 12
PIF_VALUE: 25
PIF_VALUE: 28
PIF_VALUE: 24
PIF_VALUE: 20
PIF_VALUE: 28
PIF_VALUE: 30
PIF_VALUE: 28
PIF_VALUE: 27
PIF_VALUE: 32
PIF_VALUE: 27
PIF_VALUE: 11
PIF_VALUE: 26
PIF_VALUE: 28
PIF_VALUE: 27
PIF_VALUE: 28
PIF_VALUE: 34
PIF_VALUE: 30
PIF_VALUE: 27
PIF_VALUE: 27
PIF_VALUE: 29
PIF_VALUE: 25
PIF_VALUE: 30
PIF_VALUE: 30
PIF_VALUE: 26
PIF_VALUE: 29
PIF_VALUE: 38
PIF_VALUE: 33
PIF_VALUE: 30
PIF_VALUE: 27
PIF_VALUE: 26
PIF_VALUE: 27
PIF_VALUE: 24
PIF_VALUE: 28
PIF_VALUE: 28
PIF_VALUE: 30
PIF_VALUE: 26
PIF_VALUE: 27
PIF_VALUE: 27
PIF_VALUE: 22
PIF_VALUE: 28
PIF_VALUE: 28
PIF_VALUE: 31
PIF_VALUE: 31
PIF_VALUE: 29
PIF_VALUE: 27
PIF_VALUE: 30
PIF_VALUE: 28
PIF_VALUE: 19
PIF_VALUE: 27
PIF_VALUE: 14
PIF_VALUE: 44
PIF_VALUE: 25
PIF_VALUE: 26
PIF_VALUE: 29
PIF_VALUE: 25
PIF_VALUE: 35
PIF_VALUE: 32
PIF_VALUE: 45
PIF_VALUE: 27
PIF_VALUE: 33
PIF_VALUE: 14
PIF_VALUE: 31
PIF_VALUE: 31
PIF_VALUE: 27
PIF_VALUE: 27
PIF_VALUE: 14
PIF_VALUE: 28
PIF_VALUE: 25
PIF_VALUE: 26
PIF_VALUE: 30

## 2025-01-01 ASSESSMENT — PAIN SCALES - GENERAL
PAINLEVEL_OUTOF10: 0
PAINLEVEL_OUTOF10: 2
PAINLEVEL_OUTOF10: 0

## 2025-01-01 ASSESSMENT — PAIN SCALES - WONG BAKER

## 2025-02-17 ENCOUNTER — APPOINTMENT (OUTPATIENT)
Dept: CT IMAGING | Age: 60
DRG: 207 | End: 2025-02-17
Payer: MEDICARE

## 2025-02-17 ENCOUNTER — HOSPITAL ENCOUNTER (INPATIENT)
Age: 60
LOS: 28 days | Discharge: NURSING, REHAB OR LONG-TERM CARE | DRG: 207 | End: 2025-03-17
Attending: STUDENT IN AN ORGANIZED HEALTH CARE EDUCATION/TRAINING PROGRAM | Admitting: FAMILY MEDICINE
Payer: MEDICARE

## 2025-02-17 DIAGNOSIS — F10.10 ETOH ABUSE: ICD-10-CM

## 2025-02-17 DIAGNOSIS — E87.6 HYPOKALEMIA: ICD-10-CM

## 2025-02-17 DIAGNOSIS — J96.01 ACUTE HYPOXIC RESPIRATORY FAILURE: Primary | ICD-10-CM

## 2025-02-17 DIAGNOSIS — R79.1 ELEVATED PARTIAL THROMBOPLASTIN TIME (PTT): ICD-10-CM

## 2025-02-17 DIAGNOSIS — E87.20 LACTIC ACIDOSIS: ICD-10-CM

## 2025-02-17 DIAGNOSIS — R29.6 MULTIPLE FALLS: ICD-10-CM

## 2025-02-17 DIAGNOSIS — S09.90XA CLOSED HEAD INJURY, INITIAL ENCOUNTER: ICD-10-CM

## 2025-02-17 DIAGNOSIS — I50.9 CONGESTIVE HEART FAILURE, UNSPECIFIED HF CHRONICITY, UNSPECIFIED HEART FAILURE TYPE (HCC): ICD-10-CM

## 2025-02-17 DIAGNOSIS — R79.1 SUPRATHERAPEUTIC INR: ICD-10-CM

## 2025-02-17 DIAGNOSIS — S30.1XXA TRAUMATIC ECCHYMOSIS OF FLANK, INITIAL ENCOUNTER: ICD-10-CM

## 2025-02-17 DIAGNOSIS — M79.89 LEFT ARM SWELLING: ICD-10-CM

## 2025-02-17 DIAGNOSIS — D64.9 ANEMIA, UNSPECIFIED TYPE: ICD-10-CM

## 2025-02-17 PROBLEM — R09.02 HYPOXIA: Status: ACTIVE | Noted: 2025-02-17

## 2025-02-17 LAB
AADO2: 213.2 MMHG
ALBUMIN SERPL-MCNC: 3.7 G/DL (ref 3.5–5.2)
ALP SERPL-CCNC: 211 U/L (ref 40–129)
ALT SERPL-CCNC: 26 U/L (ref 0–40)
AMMONIA PLAS-SCNC: 46 UMOL/L (ref 16–60)
AMPHET UR QL SCN: NEGATIVE
ANION GAP SERPL CALCULATED.3IONS-SCNC: 20 MMOL/L (ref 7–16)
APAP SERPL-MCNC: <5 UG/ML (ref 10–30)
AST SERPL-CCNC: 59 U/L (ref 0–39)
B PARAP IS1001 DNA NPH QL NAA+NON-PROBE: NOT DETECTED
B PERT DNA SPEC QL NAA+PROBE: NOT DETECTED
B.E.: 3.9 MMOL/L (ref -3–3)
B.E.: 4.4 MMOL/L (ref -3–3)
BARBITURATES UR QL SCN: NEGATIVE
BASOPHILS # BLD: 0.04 K/UL (ref 0–0.2)
BASOPHILS NFR BLD: 0 % (ref 0–2)
BENZODIAZ UR QL: NEGATIVE
BILIRUB SERPL-MCNC: 2.1 MG/DL (ref 0–1.2)
BNP SERPL-MCNC: 1241 PG/ML (ref 0–125)
BUN SERPL-MCNC: 39 MG/DL (ref 6–20)
BUPRENORPHINE UR QL: NEGATIVE
C PNEUM DNA NPH QL NAA+NON-PROBE: NOT DETECTED
CALCIUM SERPL-MCNC: 8.4 MG/DL (ref 8.6–10.2)
CANNABINOIDS UR QL SCN: POSITIVE
CHLORIDE SERPL-SCNC: 80 MMOL/L (ref 98–107)
CO2 SERPL-SCNC: 26 MMOL/L (ref 22–29)
COCAINE UR QL SCN: NEGATIVE
COHB: 2 % (ref 0–1.5)
COHB: 2.1 % (ref 0–1.5)
CREAT SERPL-MCNC: 2.6 MG/DL (ref 0.7–1.2)
CRITICAL: ABNORMAL
CRITICAL: ABNORMAL
DATE ANALYZED: ABNORMAL
DATE ANALYZED: ABNORMAL
DATE OF COLLECTION: ABNORMAL
DATE OF COLLECTION: ABNORMAL
EOSINOPHIL # BLD: 0.02 K/UL (ref 0.05–0.5)
EOSINOPHILS RELATIVE PERCENT: 0 % (ref 0–6)
ERYTHROCYTE [DISTWIDTH] IN BLOOD BY AUTOMATED COUNT: 22.2 % (ref 11.5–15)
ETHANOLAMINE SERPL-MCNC: 171 MG/DL (ref 0–0.08)
FENTANYL UR QL: NEGATIVE
FIO2: 100 %
FLUAV RNA NPH QL NAA+NON-PROBE: NOT DETECTED
FLUBV RNA NPH QL NAA+NON-PROBE: NOT DETECTED
GFR, ESTIMATED: 28 ML/MIN/1.73M2
GLUCOSE SERPL-MCNC: 101 MG/DL (ref 74–99)
HADV DNA NPH QL NAA+NON-PROBE: NOT DETECTED
HCO3: 26.9 MMOL/L (ref 22–26)
HCO3: 28.2 MMOL/L (ref 22–26)
HCOV 229E RNA NPH QL NAA+NON-PROBE: NOT DETECTED
HCOV HKU1 RNA NPH QL NAA+NON-PROBE: NOT DETECTED
HCOV NL63 RNA NPH QL NAA+NON-PROBE: NOT DETECTED
HCOV OC43 RNA NPH QL NAA+NON-PROBE: NOT DETECTED
HCT VFR BLD AUTO: 22.3 % (ref 37–54)
HGB BLD-MCNC: 7 G/DL (ref 12.5–16.5)
HHB: 0.1 % (ref 0–5)
HHB: 1.3 % (ref 0–5)
HMPV RNA NPH QL NAA+NON-PROBE: NOT DETECTED
HPIV1 RNA NPH QL NAA+NON-PROBE: NOT DETECTED
HPIV2 RNA NPH QL NAA+NON-PROBE: NOT DETECTED
HPIV3 RNA NPH QL NAA+NON-PROBE: NOT DETECTED
HPIV4 RNA NPH QL NAA+NON-PROBE: NOT DETECTED
IMM GRANULOCYTES # BLD AUTO: 0.17 K/UL (ref 0–0.58)
IMM GRANULOCYTES NFR BLD: 2 % (ref 0–5)
INR PPP: 4.6
LAB: ABNORMAL
LAB: ABNORMAL
LACTATE BLDV-SCNC: 5.2 MMOL/L (ref 0.5–1.9)
LACTATE BLDV-SCNC: 6.2 MMOL/L (ref 0.5–2.2)
LIPASE SERPL-CCNC: 40 U/L (ref 13–60)
LYMPHOCYTES NFR BLD: 0.73 K/UL (ref 1.5–4)
LYMPHOCYTES RELATIVE PERCENT: 7 % (ref 20–42)
Lab: 1627
Lab: 1831
M PNEUMO DNA NPH QL NAA+NON-PROBE: NOT DETECTED
MCH RBC QN AUTO: 27.7 PG (ref 26–35)
MCHC RBC AUTO-ENTMCNC: 31.4 G/DL (ref 32–34.5)
MCV RBC AUTO: 88.1 FL (ref 80–99.9)
METHADONE UR QL: NEGATIVE
METHB: 0.5 % (ref 0–1.5)
METHB: 0.7 % (ref 0–1.5)
MODE: ABNORMAL
MODE: ABNORMAL
MONOCYTES NFR BLD: 0.51 K/UL (ref 0.1–0.95)
MONOCYTES NFR BLD: 5 % (ref 2–12)
NEUTROPHILS NFR BLD: 87 % (ref 43–80)
NEUTS SEG NFR BLD: 9.51 K/UL (ref 1.8–7.3)
O2 SATURATION: 98.7 % (ref 92–98.5)
O2 SATURATION: 99.9 % (ref 92–98.5)
O2HB: 96.2 % (ref 94–97)
O2HB: 97.1 % (ref 94–97)
OPERATOR ID: 467
OPERATOR ID: 467
OPIATES UR QL SCN: NEGATIVE
OXYCODONE UR QL SCN: NEGATIVE
PARTIAL THROMBOPLASTIN TIME: 47.5 SEC (ref 24.5–35.1)
PATIENT TEMP: 37 C
PATIENT TEMP: 37 C
PCO2: 31.4 MMHG (ref 35–45)
PCO2: 41.5 MMHG (ref 35–45)
PCP UR QL SCN: NEGATIVE
PEEP/CPAP: 6 CMH2O
PFO2: 4.43 MMHG/%
PH BLOOD GAS: 7.45 (ref 7.35–7.45)
PH BLOOD GAS: 7.55 (ref 7.35–7.45)
PLATELET # BLD AUTO: 100 K/UL (ref 130–450)
PMV BLD AUTO: 11.5 FL (ref 7–12)
PO2: 142.1 MMHG (ref 75–100)
PO2: 443.4 MMHG (ref 75–100)
POTASSIUM SERPL-SCNC: 2.6 MMOL/L (ref 3.5–5)
PROT SERPL-MCNC: 6.5 G/DL (ref 6.4–8.3)
PROTHROMBIN TIME: 51.7 SEC (ref 9.3–12.4)
PS: 12 CMH20
RBC # BLD AUTO: 2.53 M/UL (ref 3.8–5.8)
RBC # BLD: ABNORMAL 10*6/UL
RI(T): 0.48
RSV RNA NPH QL NAA+NON-PROBE: NOT DETECTED
RV+EV RNA NPH QL NAA+NON-PROBE: NOT DETECTED
SALICYLATES SERPL-MCNC: <0.3 MG/DL (ref 0–30)
SARS-COV-2 RNA NPH QL NAA+NON-PROBE: NOT DETECTED
SODIUM SERPL-SCNC: 126 MMOL/L (ref 132–146)
SOURCE, BLOOD GAS: ABNORMAL
SOURCE, BLOOD GAS: ABNORMAL
SPECIMEN DESCRIPTION: NORMAL
TEST INFORMATION: ABNORMAL
THB: 7.1 G/DL (ref 11.5–16.5)
THB: 7.5 G/DL (ref 11.5–16.5)
TIME ANALYZED: 1629
TIME ANALYZED: 1841
TOXIC TRICYCLIC SC,BLOOD: NEGATIVE
TROPONIN I SERPL HS-MCNC: 199 NG/L (ref 0–11)
TROPONIN I SERPL HS-MCNC: 214 NG/L (ref 0–11)
WBC OTHER # BLD: 11 K/UL (ref 4.5–11.5)

## 2025-02-17 PROCEDURE — 6360000004 HC RX CONTRAST MEDICATION: Performed by: RADIOLOGY

## 2025-02-17 PROCEDURE — 99285 EMERGENCY DEPT VISIT HI MDM: CPT

## 2025-02-17 PROCEDURE — G0480 DRUG TEST DEF 1-7 CLASSES: HCPCS

## 2025-02-17 PROCEDURE — 80307 DRUG TEST PRSMV CHEM ANLYZR: CPT

## 2025-02-17 PROCEDURE — 72125 CT NECK SPINE W/O DYE: CPT

## 2025-02-17 PROCEDURE — 2060000000 HC ICU INTERMEDIATE R&B

## 2025-02-17 PROCEDURE — 85610 PROTHROMBIN TIME: CPT

## 2025-02-17 PROCEDURE — 5A09557 ASSISTANCE WITH RESPIRATORY VENTILATION, GREATER THAN 96 CONSECUTIVE HOURS, CONTINUOUS POSITIVE AIRWAY PRESSURE: ICD-10-PCS | Performed by: FAMILY MEDICINE

## 2025-02-17 PROCEDURE — 83605 ASSAY OF LACTIC ACID: CPT

## 2025-02-17 PROCEDURE — 82140 ASSAY OF AMMONIA: CPT

## 2025-02-17 PROCEDURE — 80053 COMPREHEN METABOLIC PANEL: CPT

## 2025-02-17 PROCEDURE — 6370000000 HC RX 637 (ALT 250 FOR IP)

## 2025-02-17 PROCEDURE — 99223 1ST HOSP IP/OBS HIGH 75: CPT | Performed by: FAMILY MEDICINE

## 2025-02-17 PROCEDURE — 85730 THROMBOPLASTIN TIME PARTIAL: CPT

## 2025-02-17 PROCEDURE — 80179 DRUG ASSAY SALICYLATE: CPT

## 2025-02-17 PROCEDURE — 70450 CT HEAD/BRAIN W/O DYE: CPT

## 2025-02-17 PROCEDURE — 87150 DNA/RNA AMPLIFIED PROBE: CPT

## 2025-02-17 PROCEDURE — 2700000000 HC OXYGEN THERAPY PER DAY

## 2025-02-17 PROCEDURE — 74177 CT ABD & PELVIS W/CONTRAST: CPT

## 2025-02-17 PROCEDURE — 71275 CT ANGIOGRAPHY CHEST: CPT

## 2025-02-17 PROCEDURE — 82805 BLOOD GASES W/O2 SATURATION: CPT

## 2025-02-17 PROCEDURE — 6360000002 HC RX W HCPCS: Performed by: FAMILY MEDICINE

## 2025-02-17 PROCEDURE — 85025 COMPLETE CBC W/AUTO DIFF WBC: CPT

## 2025-02-17 PROCEDURE — 96374 THER/PROPH/DIAG INJ IV PUSH: CPT

## 2025-02-17 PROCEDURE — 0202U NFCT DS 22 TRGT SARS-COV-2: CPT

## 2025-02-17 PROCEDURE — 84484 ASSAY OF TROPONIN QUANT: CPT

## 2025-02-17 PROCEDURE — 87040 BLOOD CULTURE FOR BACTERIA: CPT

## 2025-02-17 PROCEDURE — 80143 DRUG ASSAY ACETAMINOPHEN: CPT

## 2025-02-17 PROCEDURE — 83690 ASSAY OF LIPASE: CPT

## 2025-02-17 PROCEDURE — 96366 THER/PROPH/DIAG IV INF ADDON: CPT

## 2025-02-17 PROCEDURE — 2580000003 HC RX 258

## 2025-02-17 PROCEDURE — 96365 THER/PROPH/DIAG IV INF INIT: CPT

## 2025-02-17 PROCEDURE — 83880 ASSAY OF NATRIURETIC PEPTIDE: CPT

## 2025-02-17 PROCEDURE — 6360000002 HC RX W HCPCS

## 2025-02-17 PROCEDURE — 83735 ASSAY OF MAGNESIUM: CPT

## 2025-02-17 PROCEDURE — 94660 CPAP INITIATION&MGMT: CPT

## 2025-02-17 RX ORDER — PROCHLORPERAZINE MALEATE 10 MG
10 TABLET ORAL EVERY 8 HOURS PRN
Status: DISCONTINUED | OUTPATIENT
Start: 2025-02-17 | End: 2025-03-04

## 2025-02-17 RX ORDER — POTASSIUM CHLORIDE 7.45 MG/ML
10 INJECTION INTRAVENOUS PRN
Status: DISCONTINUED | OUTPATIENT
Start: 2025-02-17 | End: 2025-02-22

## 2025-02-17 RX ORDER — PROCHLORPERAZINE EDISYLATE 5 MG/ML
10 INJECTION INTRAMUSCULAR; INTRAVENOUS EVERY 6 HOURS PRN
Status: DISCONTINUED | OUTPATIENT
Start: 2025-02-17 | End: 2025-03-04

## 2025-02-17 RX ORDER — POLYETHYLENE GLYCOL 3350 17 G/17G
17 POWDER, FOR SOLUTION ORAL DAILY PRN
Status: DISCONTINUED | OUTPATIENT
Start: 2025-02-17 | End: 2025-02-21

## 2025-02-17 RX ORDER — ONDANSETRON 4 MG/1
4 TABLET, ORALLY DISINTEGRATING ORAL EVERY 8 HOURS PRN
Status: DISCONTINUED | OUTPATIENT
Start: 2025-02-17 | End: 2025-02-17

## 2025-02-17 RX ORDER — LORAZEPAM 2 MG/ML
0.5 INJECTION INTRAMUSCULAR ONCE
Status: COMPLETED | OUTPATIENT
Start: 2025-02-17 | End: 2025-02-17

## 2025-02-17 RX ORDER — LANOLIN ALCOHOL/MO/W.PET/CERES
100 CREAM (GRAM) TOPICAL DAILY
Status: DISCONTINUED | OUTPATIENT
Start: 2025-02-17 | End: 2025-02-18 | Stop reason: SDUPTHER

## 2025-02-17 RX ORDER — MAGNESIUM SULFATE IN WATER 40 MG/ML
2000 INJECTION, SOLUTION INTRAVENOUS PRN
Status: DISCONTINUED | OUTPATIENT
Start: 2025-02-17 | End: 2025-02-22

## 2025-02-17 RX ORDER — ONDANSETRON 2 MG/ML
4 INJECTION INTRAMUSCULAR; INTRAVENOUS EVERY 6 HOURS PRN
Status: DISCONTINUED | OUTPATIENT
Start: 2025-02-17 | End: 2025-02-17

## 2025-02-17 RX ORDER — POTASSIUM CHLORIDE 1500 MG/1
40 TABLET, EXTENDED RELEASE ORAL PRN
Status: DISCONTINUED | OUTPATIENT
Start: 2025-02-17 | End: 2025-02-22

## 2025-02-17 RX ORDER — POTASSIUM CHLORIDE 7.45 MG/ML
10 INJECTION INTRAVENOUS
Status: DISPENSED | OUTPATIENT
Start: 2025-02-17 | End: 2025-02-17

## 2025-02-17 RX ORDER — SODIUM CHLORIDE 0.9 % (FLUSH) 0.9 %
5-40 SYRINGE (ML) INJECTION PRN
Status: DISCONTINUED | OUTPATIENT
Start: 2025-02-17 | End: 2025-03-17 | Stop reason: HOSPADM

## 2025-02-17 RX ORDER — MAGNESIUM SULFATE IN WATER 40 MG/ML
2000 INJECTION, SOLUTION INTRAVENOUS ONCE
Status: COMPLETED | OUTPATIENT
Start: 2025-02-17 | End: 2025-02-17

## 2025-02-17 RX ORDER — IOPAMIDOL 755 MG/ML
90 INJECTION, SOLUTION INTRAVASCULAR
Status: COMPLETED | OUTPATIENT
Start: 2025-02-17 | End: 2025-02-17

## 2025-02-17 RX ORDER — SODIUM CHLORIDE 9 MG/ML
INJECTION, SOLUTION INTRAVENOUS PRN
Status: DISCONTINUED | OUTPATIENT
Start: 2025-02-17 | End: 2025-03-17 | Stop reason: HOSPADM

## 2025-02-17 RX ORDER — 0.9 % SODIUM CHLORIDE 0.9 %
500 INTRAVENOUS SOLUTION INTRAVENOUS ONCE
Status: DISCONTINUED | OUTPATIENT
Start: 2025-02-17 | End: 2025-02-17

## 2025-02-17 RX ORDER — SODIUM CHLORIDE 0.9 % (FLUSH) 0.9 %
5-40 SYRINGE (ML) INJECTION EVERY 12 HOURS SCHEDULED
Status: DISCONTINUED | OUTPATIENT
Start: 2025-02-17 | End: 2025-03-04

## 2025-02-17 RX ADMIN — POTASSIUM CHLORIDE 10 MEQ: 7.46 INJECTION, SOLUTION INTRAVENOUS at 22:10

## 2025-02-17 RX ADMIN — Medication 100 MG: at 17:23

## 2025-02-17 RX ADMIN — MAGNESIUM SULFATE HEPTAHYDRATE 2000 MG: 40 INJECTION, SOLUTION INTRAVENOUS at 17:47

## 2025-02-17 RX ADMIN — POTASSIUM CHLORIDE 10 MEQ: 7.46 INJECTION, SOLUTION INTRAVENOUS at 18:47

## 2025-02-17 RX ADMIN — SODIUM CHLORIDE 500 ML: 9 INJECTION, SOLUTION INTRAVENOUS at 17:30

## 2025-02-17 RX ADMIN — LORAZEPAM 0.5 MG: 2 INJECTION INTRAMUSCULAR; INTRAVENOUS at 17:23

## 2025-02-17 RX ADMIN — POTASSIUM CHLORIDE 10 MEQ: 7.46 INJECTION, SOLUTION INTRAVENOUS at 20:31

## 2025-02-17 RX ADMIN — IOPAMIDOL 90 ML: 755 INJECTION, SOLUTION INTRAVENOUS at 17:02

## 2025-02-17 ASSESSMENT — LIFESTYLE VARIABLES
HOW MANY STANDARD DRINKS CONTAINING ALCOHOL DO YOU HAVE ON A TYPICAL DAY: 5 OR 6
HOW OFTEN DO YOU HAVE A DRINK CONTAINING ALCOHOL: NEVER

## 2025-02-17 NOTE — ED NOTES
Radiology Procedure Waiver   Name: Len Dorsey  : 1965  MRN: [id number]    Date:  25    Time: 4:27 PM EST    Benefits of immediately proceeding with Radiology exam(s) without pre-testing outweigh the risks or are not indicated as specified below and therefore the following is/are being waived:    [] Pregnancy test   [] Patients LMP on-time and regular.   [] Patient had Tubal Ligation or has other Contraception Device.   [] Patient  is Menopausal or Premenarcheal.    [] Patient had Full or Partial Hysterectomy.    [] Protocol for Iodine allergy    [] MRI Questionnaire     [x] BUN/Creatinine   [] Patient age w/no hx of renal dysfunction.   [] Patient on Dialysis.   [] Recent Normal Labs.  Electronically signed by Armando Oliver MD on 25 at 4:27 PM EST          Benefits outweigh risk.

## 2025-02-17 NOTE — ED PROVIDER NOTES
Patient moves all 4 extremities with no neurologic deficits on exam.  Twelve-lead EKG showed a ventricular paced rhythm at a rate of 88 is otherwise unremarkable at this time.  Patient was pan scanned due to concern for traumatic injury with a CTA pulm also due to concern for PE.  CT head and C-spine done showing no acute intracranial or cervical spine abnormality.  CTA pulm done showing no evidence of PE.  CT abdomen pelvis done that showed subcutaneous edema concerning for acute traumatic injury versus fluid overload.  Also showed cholelithiasis with a distended gallbladder with no signs of cholecystitis.  Labs as interpreted by me showing a Rester panel is unremarkable and negative.  Urine drug screen is positive for cannabinoids.  Lipase not elevated.  Ammonia level not elevated.  BNP is elevated there is slight concern for fluid overload at this time.  aPTT is elevated.  Christofer is remarkable for an ethanol level of 171.  Initial ABG on arrival unremarkable this time patient was weaned off of BiPAP to nasal cannula and is tolerating well.  Blood culture obtained and pending at this time.  Patient did have lactic acidosis with a repeat is downtrending at this time.CMP obtained showing hyperkalemia with hyponatremia also showed evidence of CKD with creatinine of 2.6 and a GFR of 28.CBC obtained showed no leukocytosis but did show evidence of chronic anemia.  Of note, patient denies any signs of hematemesis, hemoptysis or melena at this time.  Manage level unremarkable.  Patient states he is on warfarin and INR was found to be supratherapeutic at 4.6.  Patient reassessed and is tolerating nasal cannula.  Believe patient would benefit from admission at this time for further evaluation.  Patient is agreeable to plan for admission.  Hospitalist consulted accepted patient for admission.  All questions answered.    Vital signs upon arrival /75   Pulse 93   Temp 98.6 °F (37 °C) (Oral)   Resp 17   Wt 106.6 kg

## 2025-02-18 PROBLEM — I48.0 PAF (PAROXYSMAL ATRIAL FIBRILLATION) (HCC): Status: ACTIVE | Noted: 2025-02-18

## 2025-02-18 PROBLEM — J96.01 ACUTE HYPOXIC RESPIRATORY FAILURE: Status: ACTIVE | Noted: 2025-02-18

## 2025-02-18 PROBLEM — F10.10 ETOH ABUSE: Status: ACTIVE | Noted: 2025-02-18

## 2025-02-18 PROBLEM — E87.6 HYPOKALEMIA: Status: ACTIVE | Noted: 2025-02-18

## 2025-02-18 PROBLEM — I50.9 CONGESTIVE HEART FAILURE (HCC): Status: ACTIVE | Noted: 2025-02-18

## 2025-02-18 LAB
ALBUMIN SERPL-MCNC: 3.4 G/DL (ref 3.5–5.2)
ALP SERPL-CCNC: 189 U/L (ref 40–129)
ALT SERPL-CCNC: 20 U/L (ref 0–40)
ANION GAP SERPL CALCULATED.3IONS-SCNC: 11 MMOL/L (ref 7–16)
ANION GAP SERPL CALCULATED.3IONS-SCNC: 12 MMOL/L (ref 7–16)
AST SERPL-CCNC: 39 U/L (ref 0–39)
BASOPHILS # BLD: 0.1 K/UL (ref 0–0.2)
BASOPHILS NFR BLD: 1 % (ref 0–2)
BILIRUB SERPL-MCNC: 2.2 MG/DL (ref 0–1.2)
BUN SERPL-MCNC: 41 MG/DL (ref 6–20)
BUN SERPL-MCNC: 42 MG/DL (ref 6–20)
CALCIUM SERPL-MCNC: 7.9 MG/DL (ref 8.6–10.2)
CALCIUM SERPL-MCNC: 8.3 MG/DL (ref 8.6–10.2)
CHLORIDE SERPL-SCNC: 84 MMOL/L (ref 98–107)
CHLORIDE SERPL-SCNC: 88 MMOL/L (ref 98–107)
CHOLEST SERPL-MCNC: 112 MG/DL
CO2 SERPL-SCNC: 31 MMOL/L (ref 22–29)
CO2 SERPL-SCNC: 34 MMOL/L (ref 22–29)
CREAT SERPL-MCNC: 2.2 MG/DL (ref 0.7–1.2)
CREAT SERPL-MCNC: 2.5 MG/DL (ref 0.7–1.2)
EOSINOPHIL # BLD: 0.1 K/UL (ref 0.05–0.5)
EOSINOPHILS RELATIVE PERCENT: 1 % (ref 0–6)
ERYTHROCYTE [DISTWIDTH] IN BLOOD BY AUTOMATED COUNT: 22.5 % (ref 11.5–15)
ETHANOLAMINE SERPL-MCNC: <10 MG/DL (ref 0–0.08)
GFR, ESTIMATED: 30 ML/MIN/1.73M2
GFR, ESTIMATED: 34 ML/MIN/1.73M2
GLUCOSE SERPL-MCNC: 104 MG/DL (ref 74–99)
GLUCOSE SERPL-MCNC: 110 MG/DL (ref 74–99)
HCT VFR BLD AUTO: 19.1 % (ref 37–54)
HCT VFR BLD AUTO: 21.4 % (ref 37–54)
HDLC SERPL-MCNC: 57 MG/DL
HGB BLD-MCNC: 6 G/DL (ref 12.5–16.5)
HGB BLD-MCNC: 6.7 G/DL (ref 12.5–16.5)
INR PPP: 4.6
LACTATE BLDV-SCNC: 1.6 MMOL/L (ref 0.5–2.2)
LDLC SERPL CALC-MCNC: 27 MG/DL
LEFT VENTRICULAR EJECTION FRACTION MODE: NORMAL
LV EF: 50 %
LYMPHOCYTES NFR BLD: 0.48 K/UL (ref 1.5–4)
LYMPHOCYTES RELATIVE PERCENT: 4 % (ref 20–42)
MAGNESIUM SERPL-MCNC: 1.5 MG/DL (ref 1.6–2.6)
MAGNESIUM SERPL-MCNC: 1.9 MG/DL (ref 1.6–2.6)
MCH RBC QN AUTO: 28 PG (ref 26–35)
MCHC RBC AUTO-ENTMCNC: 31.4 G/DL (ref 32–34.5)
MCV RBC AUTO: 89.3 FL (ref 80–99.9)
MONOCYTES NFR BLD: 0.77 K/UL (ref 0.1–0.95)
MONOCYTES NFR BLD: 7 % (ref 2–12)
NEUTROPHILS NFR BLD: 87 % (ref 43–80)
NEUTS SEG NFR BLD: 9.65 K/UL (ref 1.8–7.3)
PLATELET # BLD AUTO: 58 K/UL (ref 130–450)
PLATELET CONFIRMATION: NORMAL
PMV BLD AUTO: 10.9 FL (ref 7–12)
POTASSIUM SERPL-SCNC: 2.7 MMOL/L (ref 3.5–5)
POTASSIUM SERPL-SCNC: 2.7 MMOL/L (ref 3.5–5)
PROT SERPL-MCNC: 6 G/DL (ref 6.4–8.3)
PROTHROMBIN TIME: 51.2 SEC (ref 9.3–12.4)
RBC # BLD AUTO: 2.14 M/UL (ref 3.8–5.8)
RBC # BLD: ABNORMAL 10*6/UL
SODIUM SERPL-SCNC: 129 MMOL/L (ref 132–146)
SODIUM SERPL-SCNC: 131 MMOL/L (ref 132–146)
TRIGL SERPL-MCNC: 138 MG/DL
TSH SERPL DL<=0.05 MIU/L-ACNC: 1.69 UIU/ML (ref 0.27–4.2)
VLDLC SERPL CALC-MCNC: 28 MG/DL
WBC OTHER # BLD: 11.1 K/UL (ref 4.5–11.5)

## 2025-02-18 PROCEDURE — 6370000000 HC RX 637 (ALT 250 FOR IP): Performed by: STUDENT IN AN ORGANIZED HEALTH CARE EDUCATION/TRAINING PROGRAM

## 2025-02-18 PROCEDURE — 36430 TRANSFUSION BLD/BLD COMPNT: CPT

## 2025-02-18 PROCEDURE — 6360000002 HC RX W HCPCS: Performed by: STUDENT IN AN ORGANIZED HEALTH CARE EDUCATION/TRAINING PROGRAM

## 2025-02-18 PROCEDURE — 2500000003 HC RX 250 WO HCPCS: Performed by: FAMILY MEDICINE

## 2025-02-18 PROCEDURE — 2580000003 HC RX 258: Performed by: STUDENT IN AN ORGANIZED HEALTH CARE EDUCATION/TRAINING PROGRAM

## 2025-02-18 PROCEDURE — 83735 ASSAY OF MAGNESIUM: CPT

## 2025-02-18 PROCEDURE — 2580000003 HC RX 258: Performed by: NURSE PRACTITIONER

## 2025-02-18 PROCEDURE — 99222 1ST HOSP IP/OBS MODERATE 55: CPT | Performed by: STUDENT IN AN ORGANIZED HEALTH CARE EDUCATION/TRAINING PROGRAM

## 2025-02-18 PROCEDURE — 6360000002 HC RX W HCPCS: Performed by: NURSE PRACTITIONER

## 2025-02-18 PROCEDURE — 86900 BLOOD TYPING SEROLOGIC ABO: CPT

## 2025-02-18 PROCEDURE — 85018 HEMOGLOBIN: CPT

## 2025-02-18 PROCEDURE — 6360000002 HC RX W HCPCS

## 2025-02-18 PROCEDURE — 86901 BLOOD TYPING SEROLOGIC RH(D): CPT

## 2025-02-18 PROCEDURE — 6360000002 HC RX W HCPCS: Performed by: FAMILY MEDICINE

## 2025-02-18 PROCEDURE — 84443 ASSAY THYROID STIM HORMONE: CPT

## 2025-02-18 PROCEDURE — 2060000000 HC ICU INTERMEDIATE R&B

## 2025-02-18 PROCEDURE — 6370000000 HC RX 637 (ALT 250 FOR IP): Performed by: FAMILY MEDICINE

## 2025-02-18 PROCEDURE — 94660 CPAP INITIATION&MGMT: CPT

## 2025-02-18 PROCEDURE — 83605 ASSAY OF LACTIC ACID: CPT

## 2025-02-18 PROCEDURE — 85610 PROTHROMBIN TIME: CPT

## 2025-02-18 PROCEDURE — APPSS60 APP SPLIT SHARED TIME 46-60 MINUTES

## 2025-02-18 PROCEDURE — 30233N1 TRANSFUSION OF NONAUTOLOGOUS RED BLOOD CELLS INTO PERIPHERAL VEIN, PERCUTANEOUS APPROACH: ICD-10-PCS | Performed by: FAMILY MEDICINE

## 2025-02-18 PROCEDURE — G0480 DRUG TEST DEF 1-7 CLASSES: HCPCS

## 2025-02-18 PROCEDURE — 80053 COMPREHEN METABOLIC PANEL: CPT

## 2025-02-18 PROCEDURE — 99233 SBSQ HOSP IP/OBS HIGH 50: CPT | Performed by: STUDENT IN AN ORGANIZED HEALTH CARE EDUCATION/TRAINING PROGRAM

## 2025-02-18 PROCEDURE — 99223 1ST HOSP IP/OBS HIGH 75: CPT | Performed by: INTERNAL MEDICINE

## 2025-02-18 PROCEDURE — 85025 COMPLETE CBC W/AUTO DIFF WBC: CPT

## 2025-02-18 PROCEDURE — P9016 RBC LEUKOCYTES REDUCED: HCPCS

## 2025-02-18 PROCEDURE — 80048 BASIC METABOLIC PNL TOTAL CA: CPT

## 2025-02-18 PROCEDURE — 2700000000 HC OXYGEN THERAPY PER DAY

## 2025-02-18 PROCEDURE — 86850 RBC ANTIBODY SCREEN: CPT

## 2025-02-18 PROCEDURE — 5A0945A ASSISTANCE WITH RESPIRATORY VENTILATION, 24-96 CONSECUTIVE HOURS, HIGH NASAL FLOW/VELOCITY: ICD-10-PCS | Performed by: FAMILY MEDICINE

## 2025-02-18 PROCEDURE — 85014 HEMATOCRIT: CPT

## 2025-02-18 PROCEDURE — 86923 COMPATIBILITY TEST ELECTRIC: CPT

## 2025-02-18 PROCEDURE — 80061 LIPID PANEL: CPT

## 2025-02-18 RX ORDER — LANOLIN ALCOHOL/MO/W.PET/CERES
100 CREAM (GRAM) TOPICAL DAILY
Status: DISCONTINUED | OUTPATIENT
Start: 2025-02-18 | End: 2025-02-21

## 2025-02-18 RX ORDER — SODIUM CHLORIDE 9 MG/ML
INJECTION, SOLUTION INTRAVENOUS PRN
Status: DISCONTINUED | OUTPATIENT
Start: 2025-02-18 | End: 2025-03-04

## 2025-02-18 RX ORDER — LORAZEPAM 1 MG/1
4 TABLET ORAL
Status: DISCONTINUED | OUTPATIENT
Start: 2025-02-18 | End: 2025-02-19

## 2025-02-18 RX ORDER — LORAZEPAM 1 MG/1
2 TABLET ORAL
Status: DISCONTINUED | OUTPATIENT
Start: 2025-02-18 | End: 2025-02-21

## 2025-02-18 RX ORDER — CHLORDIAZEPOXIDE HYDROCHLORIDE 25 MG/1
CAPSULE, GELATIN COATED ORAL
Status: DISCONTINUED
Start: 2025-02-18 | End: 2025-02-18 | Stop reason: WASHOUT

## 2025-02-18 RX ORDER — LORAZEPAM 2 MG/ML
3 INJECTION INTRAMUSCULAR
Status: DISCONTINUED | OUTPATIENT
Start: 2025-02-18 | End: 2025-02-19

## 2025-02-18 RX ORDER — LORAZEPAM 2 MG/ML
2 INJECTION INTRAMUSCULAR
Status: DISCONTINUED | OUTPATIENT
Start: 2025-02-18 | End: 2025-02-19

## 2025-02-18 RX ORDER — SODIUM CHLORIDE 0.9 % (FLUSH) 0.9 %
5-40 SYRINGE (ML) INJECTION EVERY 12 HOURS SCHEDULED
Status: DISCONTINUED | OUTPATIENT
Start: 2025-02-18 | End: 2025-03-04

## 2025-02-18 RX ORDER — LORAZEPAM 1 MG/1
3 TABLET ORAL
Status: DISCONTINUED | OUTPATIENT
Start: 2025-02-18 | End: 2025-02-19

## 2025-02-18 RX ORDER — POTASSIUM CHLORIDE 1500 MG/1
40 TABLET, EXTENDED RELEASE ORAL
Status: DISCONTINUED | OUTPATIENT
Start: 2025-02-18 | End: 2025-02-18

## 2025-02-18 RX ORDER — CHLORDIAZEPOXIDE HYDROCHLORIDE 5 MG/1
5 CAPSULE, GELATIN COATED ORAL 3 TIMES DAILY
Status: DISCONTINUED | OUTPATIENT
Start: 2025-02-18 | End: 2025-02-18

## 2025-02-18 RX ORDER — LORAZEPAM 2 MG/ML
4 INJECTION INTRAMUSCULAR
Status: DISCONTINUED | OUTPATIENT
Start: 2025-02-18 | End: 2025-02-19

## 2025-02-18 RX ORDER — LORAZEPAM 2 MG/ML
1 INJECTION INTRAMUSCULAR
Status: DISCONTINUED | OUTPATIENT
Start: 2025-02-18 | End: 2025-02-19

## 2025-02-18 RX ORDER — CHLORDIAZEPOXIDE HYDROCHLORIDE 25 MG/1
50 CAPSULE, GELATIN COATED ORAL 3 TIMES DAILY
Status: COMPLETED | OUTPATIENT
Start: 2025-02-18 | End: 2025-02-18

## 2025-02-18 RX ORDER — MAGNESIUM SULFATE IN WATER 40 MG/ML
2000 INJECTION, SOLUTION INTRAVENOUS ONCE
Status: COMPLETED | OUTPATIENT
Start: 2025-02-18 | End: 2025-02-18

## 2025-02-18 RX ORDER — LANOLIN ALCOHOL/MO/W.PET/CERES
400 CREAM (GRAM) TOPICAL DAILY
Status: DISCONTINUED | OUTPATIENT
Start: 2025-02-18 | End: 2025-03-04

## 2025-02-18 RX ORDER — LORAZEPAM 1 MG/1
1 TABLET ORAL
Status: DISCONTINUED | OUTPATIENT
Start: 2025-02-18 | End: 2025-02-21

## 2025-02-18 RX ORDER — SODIUM CHLORIDE 0.9 % (FLUSH) 0.9 %
5-40 SYRINGE (ML) INJECTION PRN
Status: DISCONTINUED | OUTPATIENT
Start: 2025-02-18 | End: 2025-03-04

## 2025-02-18 RX ORDER — POTASSIUM CHLORIDE 7.45 MG/ML
10 INJECTION INTRAVENOUS
Status: DISCONTINUED | OUTPATIENT
Start: 2025-02-18 | End: 2025-02-18

## 2025-02-18 RX ADMIN — POTASSIUM CHLORIDE 10 MEQ: 7.46 INJECTION, SOLUTION INTRAVENOUS at 10:31

## 2025-02-18 RX ADMIN — POTASSIUM CHLORIDE 10 MEQ: 7.46 INJECTION, SOLUTION INTRAVENOUS at 12:59

## 2025-02-18 RX ADMIN — POTASSIUM CHLORIDE 10 MEQ: 7.46 INJECTION, SOLUTION INTRAVENOUS at 14:28

## 2025-02-18 RX ADMIN — MAGNESIUM SULFATE HEPTAHYDRATE 2000 MG: 40 INJECTION, SOLUTION INTRAVENOUS at 09:26

## 2025-02-18 RX ADMIN — SODIUM CHLORIDE, PRESERVATIVE FREE 40 MG: 5 INJECTION INTRAVENOUS at 12:17

## 2025-02-18 RX ADMIN — SODIUM CHLORIDE, PRESERVATIVE FREE 40 MG: 5 INJECTION INTRAVENOUS at 21:51

## 2025-02-18 RX ADMIN — SODIUM CHLORIDE, PRESERVATIVE FREE 10 ML: 5 INJECTION INTRAVENOUS at 21:57

## 2025-02-18 RX ADMIN — Medication 400 MG: at 15:44

## 2025-02-18 RX ADMIN — LORAZEPAM 2 MG: 1 TABLET ORAL at 21:51

## 2025-02-18 RX ADMIN — CHLORDIAZEPOXIDE HYDROCHLORIDE 50 MG: 25 CAPSULE ORAL at 21:56

## 2025-02-18 RX ADMIN — POTASSIUM CHLORIDE 10 MEQ: 7.46 INJECTION, SOLUTION INTRAVENOUS at 08:03

## 2025-02-18 RX ADMIN — SODIUM CHLORIDE, PRESERVATIVE FREE 10 ML: 5 INJECTION INTRAVENOUS at 08:04

## 2025-02-18 RX ADMIN — SODIUM CHLORIDE 125 MG: 9 INJECTION, SOLUTION INTRAVENOUS at 14:17

## 2025-02-18 RX ADMIN — Medication 100 MG: at 08:00

## 2025-02-18 RX ADMIN — CHLORDIAZEPOXIDE HYDROCHLORIDE 50 MG: 25 CAPSULE ORAL at 15:44

## 2025-02-18 RX ADMIN — SODIUM CHLORIDE, PRESERVATIVE FREE 10 ML: 5 INJECTION INTRAVENOUS at 12:59

## 2025-02-18 RX ADMIN — LORAZEPAM 2 MG: 2 INJECTION INTRAMUSCULAR; INTRAVENOUS at 09:23

## 2025-02-18 RX ADMIN — POTASSIUM CHLORIDE 10 MEQ: 7.46 INJECTION, SOLUTION INTRAVENOUS at 09:01

## 2025-02-18 NOTE — H&P
Hospitalist History & Physical      PCP: Jama Skinner PA    Date of Service: Pt seen/examined on 2/17/2025     Chief Complaint:  had concerns including Shortness of Breath (Pt from home COPD 78% on room air, cpap on arrival. BLE edema) and Fall (Rx falls, fall from standing at home witnessed by wife, hematoma R side/back).    History Of Present Illness:    Mr. Len Dorsey, a 59 y.o. year old male  who  has a past medical history of Alcoholic liver disease (HCC), H/O prosthetic aortic valve replacement, History of mitral valve replacement, Stroke (HCC), and Testicular cancer (HCC).           Past Medical History:   Diagnosis Date    Alcoholic liver disease (HCC)     H/O prosthetic aortic valve replacement 09/2020    [facility]  Mitral and pacemaker at this time as well    History of mitral valve replacement 09/2020    at [facility]  Aortic Valve placed at this time as well as pacemaker    Stroke (HCC)     Testicular cancer (HCC)     in remission since 1988       Past Surgical History:   Procedure Laterality Date    COLONOSCOPY N/A 8/24/2022    COLONOSCOPY POLYPECTOMY HOT BIOPSY performed by Rene Lorenzo MD [place] [facility]    FRACTURE SURGERY      Left Tibial Plateau Fracture 3/29/2022     PACEMAKER PLACEMENT  09/2020    UPPER GASTROINTESTINAL ENDOSCOPY N/A 8/24/2022    EGD DIAGNOSTIC ONLY performed by Rene Lorenzo MD at [facility]    UPPER GASTROINTESTINAL ENDOSCOPY N/A 5/9/2024    ESOPHAGOGASTRODUODENOSCOPY          +++AVAIL 1430+++ performed by Min Mcdaniels DO at [facility] ENDOSCOPY       Prior to Admission medications    Medication Sig Start Date End Date Taking? Authorizing Provider   warfarin (COUMADIN) 1 MG tablet Take 1 tablet once daily 8/17/24   Peter Olivo MD   bumetanide (BUMEX) 2 MG tablet Take 1 tablet by mouth daily 8/18/24   Peter Olivo MD   tamsulosin (FLOMAX) 0.4 MG capsule Take 1 capsule by mouth at bedtime 8/17/24   Peter Olivo MD   busPIRone (BUSPAR) 
Peter HUFF MD   Vitamin D (CHOLECALCIFEROL) 25 MCG (1000 UT) TABS tablet Take 1 tablet by mouth daily 3/6/24   Peter Olivo MD         Allergies:  Patient has no known allergies.    Social History:    TOBACCO:   reports that he has been smoking cigarettes. He has a 35 pack-year smoking history. He has never used smokeless tobacco.  ETOH:   reports current alcohol use.    Family History:    Reviewed in detail and negative for DM, CAD, Cancer, CVA. Positive as follows\"  History reviewed. No pertinent family history.    REVIEW OF SYSTEMS:   Pertinent positives as noted in the HPI. All other systems reviewed and negative.    PHYSICAL EXAM:  /64   Pulse 90   Temp 97.5 °F (36.4 °C) (Oral)   Resp 22   Wt 106.6 kg (235 lb)   SpO2 100%   BMI 33.72 kg/m²   General appearance: No apparent distress, appears stated age and cooperative.  HEENT: Normal cephalic, atraumatic without obvious deformity. Pupils equal, round, and reactive to light.  Extra ocular muscles intact. Conjunctivae/corneas clear.  Neck: Supple, with full range of motion. No jugular venous distention. Trachea midline.  Respiratory: Diminished bilaterally  Cardiovascular: Regular rate and rhythm  Abdomen: Soft, nontender, nondistended  Musculoskeletal: 3+ pitting edema bilateral lower extremities  Skin: Normal skin color.  No rashes or lesions.  Neurologic:  Neurovascularly intact without any focal sensory/motor deficits. Cranial nerves: II-XII intact, grossly non-focal.  Psychiatric: Alert and oriented, thought content appropriate, normal insight    Reviewed EKG and CXR personally      CBC:   Recent Labs     02/17/25  1638   WBC 11.0   RBC 2.53*   HGB 7.0*   HCT 22.3*   MCV 88.1   RDW 22.2*   *     BMP:   Recent Labs     02/17/25  1638   *   K 2.6*   CL 80*   CO2 26   BUN 39*   CREATININE 2.6*     LFT:  Recent Labs     02/17/25  1638   ALKPHOS 211*   ALT 26   AST 59*   BILITOT 2.1*   LIPASE 40     CE:  No results for input(s):

## 2025-02-18 NOTE — CONSENT
Informed Consent for Blood Component Transfusion Note    I have discussed with the patient the rationale for blood component transfusion; its benefits in treating or preventing fatigue, organ damage, or death; and its risk which includes mild transfusion reactions, rare risk of blood borne infection, or more serious but rare reactions. I have discussed the alternatives to transfusion, including the risk and consequences of not receiving transfusion. The patient had an opportunity to ask questions and had agreed to proceed with transfusion of blood components.    Electronically signed by Chris Yusuf MD on 2/18/25 at 1:48 PM EST

## 2025-02-18 NOTE — ED NOTES
Pt incontinent to stool, full linen change and bette care provided. External male catheter placed. Pt resting comfortably in bed, warm blankets applied   PARANOIA/PTSD

## 2025-02-19 ENCOUNTER — APPOINTMENT (OUTPATIENT)
Age: 60
DRG: 207 | End: 2025-02-19
Payer: MEDICARE

## 2025-02-19 ENCOUNTER — APPOINTMENT (OUTPATIENT)
Dept: GENERAL RADIOLOGY | Age: 60
DRG: 207 | End: 2025-02-19
Payer: MEDICARE

## 2025-02-19 LAB
AADO2: 40 MMHG
ABO/RH: NORMAL
AMMONIA PLAS-SCNC: 108 UMOL/L (ref 16–60)
ANION GAP SERPL CALCULATED.3IONS-SCNC: 10 MMOL/L (ref 7–16)
ANION GAP SERPL CALCULATED.3IONS-SCNC: 15 MMOL/L (ref 7–16)
ANTIBODY SCREEN: NEGATIVE
ARM BAND NUMBER: NORMAL
B.E.: 6.7 MMOL/L (ref -3–3)
BLOOD BANK BLOOD PRODUCT EXPIRATION DATE: NORMAL
BLOOD BANK BLOOD PRODUCT EXPIRATION DATE: NORMAL
BLOOD BANK DISPENSE STATUS: NORMAL
BLOOD BANK DISPENSE STATUS: NORMAL
BLOOD BANK ISBT PRODUCT BLOOD TYPE: 9500
BLOOD BANK ISBT PRODUCT BLOOD TYPE: 9500
BLOOD BANK PRODUCT CODE: NORMAL
BLOOD BANK PRODUCT CODE: NORMAL
BLOOD BANK SAMPLE EXPIRATION: NORMAL
BLOOD BANK UNIT TYPE AND RH: NORMAL
BLOOD BANK UNIT TYPE AND RH: NORMAL
BNP SERPL-MCNC: 1459 PG/ML (ref 0–125)
BPU ID: NORMAL
BPU ID: NORMAL
BUN SERPL-MCNC: 42 MG/DL (ref 6–20)
BUN SERPL-MCNC: 42 MG/DL (ref 6–20)
CALCIUM SERPL-MCNC: 8.3 MG/DL (ref 8.6–10.2)
CALCIUM SERPL-MCNC: 8.3 MG/DL (ref 8.6–10.2)
CHLORIDE SERPL-SCNC: 89 MMOL/L (ref 98–107)
CHLORIDE SERPL-SCNC: 90 MMOL/L (ref 98–107)
CO2 SERPL-SCNC: 30 MMOL/L (ref 22–29)
CO2 SERPL-SCNC: 33 MMOL/L (ref 22–29)
COHB: 1.7 % (ref 0–1.5)
COMPONENT: NORMAL
COMPONENT: NORMAL
CREAT SERPL-MCNC: 2.3 MG/DL (ref 0.7–1.2)
CREAT SERPL-MCNC: 2.4 MG/DL (ref 0.7–1.2)
CRITICAL: ABNORMAL
CROSSMATCH RESULT: NORMAL
CROSSMATCH RESULT: NORMAL
DATE ANALYZED: ABNORMAL
DATE OF COLLECTION: ABNORMAL
ECHO AO ASC DIAM: 2.8 CM
ECHO AO ASCENDING AORTA INDEX: 1.25 CM/M2
ECHO AV AREA PEAK VELOCITY: 1.4 CM2
ECHO AV AREA VTI: 1.4 CM2
ECHO AV AREA/BSA PEAK VELOCITY: 0.6 CM2/M2
ECHO AV AREA/BSA VTI: 0.6 CM2/M2
ECHO AV CUSP MM: 2 CM
ECHO AV MEAN GRADIENT: 16 MMHG
ECHO AV MEAN VELOCITY: 1.9 M/S
ECHO AV PEAK GRADIENT: 27 MMHG
ECHO AV PEAK VELOCITY: 2.6 M/S
ECHO AV VELOCITY RATIO: 0.42
ECHO AV VTI: 45.3 CM
ECHO BSA: 2.29 M2
ECHO EST RA PRESSURE: 3 MMHG
ECHO LA VOL A-L A4C: 115 ML (ref 18–58)
ECHO LA VOL MOD A4C: 109 ML (ref 18–58)
ECHO LA VOLUME INDEX A-L A4C: 51 ML/M2 (ref 16–34)
ECHO LA VOLUME INDEX MOD A4C: 49 ML/M2 (ref 16–34)
ECHO LV EDV A2C: 90 ML
ECHO LV EDV A4C: 104 ML
ECHO LV EDV BP: 98 ML (ref 67–155)
ECHO LV EDV INDEX A4C: 46 ML/M2
ECHO LV EDV INDEX BP: 44 ML/M2
ECHO LV EDV NDEX A2C: 40 ML/M2
ECHO LV EF PHYSICIAN: 50 %
ECHO LV EJECTION FRACTION A2C: 53 %
ECHO LV EJECTION FRACTION A4C: 47 %
ECHO LV EJECTION FRACTION BIPLANE: 49 % (ref 55–100)
ECHO LV ESV A2C: 42 ML
ECHO LV ESV A4C: 55 ML
ECHO LV ESV BP: 49 ML (ref 22–58)
ECHO LV ESV INDEX A2C: 19 ML/M2
ECHO LV ESV INDEX A4C: 25 ML/M2
ECHO LV ESV INDEX BP: 22 ML/M2
ECHO LV FRACTIONAL SHORTENING: 26 % (ref 28–44)
ECHO LV INTERNAL DIMENSION DIASTOLE INDEX: 2.37 CM/M2
ECHO LV INTERNAL DIMENSION DIASTOLIC: 5.3 CM (ref 4.2–5.9)
ECHO LV INTERNAL DIMENSION SYSTOLIC INDEX: 1.74 CM/M2
ECHO LV INTERNAL DIMENSION SYSTOLIC: 3.9 CM
ECHO LVOT AREA: 3.1 CM2
ECHO LVOT AV VTI INDEX: 0.46
ECHO LVOT DIAM: 2 CM
ECHO LVOT MEAN GRADIENT: 3 MMHG
ECHO LVOT PEAK GRADIENT: 5 MMHG
ECHO LVOT PEAK VELOCITY: 1.1 M/S
ECHO LVOT STROKE VOLUME INDEX: 29.2 ML/M2
ECHO LVOT SV: 65.3 ML
ECHO LVOT VTI: 20.8 CM
ECHO MV AREA PHT: 5.3 CM2
ECHO MV AREA VTI: 1.7 CM2
ECHO MV E DECELERATION TIME (DT): 273.9 MS
ECHO MV LVOT VTI INDEX: 1.8
ECHO MV MAX VELOCITY: 2.2 M/S
ECHO MV MEAN GRADIENT: 7 MMHG
ECHO MV MEAN VELOCITY: 1.2 M/S
ECHO MV PEAK GRADIENT: 19 MMHG
ECHO MV PRESSURE HALF TIME (PHT): 41.8 MS
ECHO MV VTI: 37.4 CM
ECHO PV MAX VELOCITY: 1.2 M/S
ECHO PV MEAN GRADIENT: 3 MMHG
ECHO PV MEAN VELOCITY: 0.8 M/S
ECHO PV PEAK GRADIENT: 6 MMHG
ECHO PV VTI: 18.2 CM
ECHO RIGHT VENTRICULAR SYSTOLIC PRESSURE (RVSP): 28 MMHG
ECHO TV REGURGITANT MAX VELOCITY: 2.49 M/S
ECHO TV REGURGITANT PEAK GRADIENT: 25 MMHG
ERYTHROCYTE [DISTWIDTH] IN BLOOD BY AUTOMATED COUNT: 20.2 % (ref 11.5–15)
FIO2: 28 %
GFR, ESTIMATED: 31 ML/MIN/1.73M2
GFR, ESTIMATED: 32 ML/MIN/1.73M2
GLUCOSE BLD-MCNC: 130 MG/DL (ref 74–99)
GLUCOSE BLD-MCNC: 140 MG/DL (ref 74–99)
GLUCOSE BLD-MCNC: 167 MG/DL (ref 74–99)
GLUCOSE SERPL-MCNC: 127 MG/DL (ref 74–99)
GLUCOSE SERPL-MCNC: 142 MG/DL (ref 74–99)
HCO3: 31.6 MMOL/L (ref 22–26)
HCT VFR BLD AUTO: 22.6 % (ref 37–54)
HCT VFR BLD AUTO: 23.8 % (ref 37–54)
HGB BLD-MCNC: 7 G/DL (ref 12.5–16.5)
HGB BLD-MCNC: 7.3 G/DL (ref 12.5–16.5)
HHB: 2.2 % (ref 0–5)
INR PPP: 3.4
LAB: ABNORMAL
Lab: 2130
MCH RBC QN AUTO: 28.2 PG (ref 26–35)
MCHC RBC AUTO-ENTMCNC: 30.7 G/DL (ref 32–34.5)
MCV RBC AUTO: 91.9 FL (ref 80–99.9)
METHB: 1 % (ref 0–1.5)
MODE: ABNORMAL
O2 SATURATION: 97.7 % (ref 92–98.5)
O2HB: 95.1 % (ref 94–97)
OPERATOR ID: 1110
PATIENT TEMP: 37 C
PCO2: 47.2 MMHG (ref 35–45)
PFO2: 3.71 MMHG/%
PH BLOOD GAS: 7.44 (ref 7.35–7.45)
PLATELET # BLD AUTO: 73 K/UL (ref 130–450)
PLATELET CONFIRMATION: NORMAL
PMV BLD AUTO: 12.6 FL (ref 7–12)
PO2: 103.9 MMHG (ref 75–100)
POTASSIUM SERPL-SCNC: 3 MMOL/L (ref 3.5–5)
POTASSIUM SERPL-SCNC: 3.4 MMOL/L (ref 3.5–5)
PROTHROMBIN TIME: 37.1 SEC (ref 9.3–12.4)
RBC # BLD AUTO: 2.59 M/UL (ref 3.8–5.8)
RI(T): 0.39
SODIUM SERPL-SCNC: 133 MMOL/L (ref 132–146)
SODIUM SERPL-SCNC: 134 MMOL/L (ref 132–146)
SOURCE, BLOOD GAS: ABNORMAL
THB: 8.2 G/DL (ref 11.5–16.5)
TIME ANALYZED: 2138
TRANSFUSION STATUS: NORMAL
TRANSFUSION STATUS: NORMAL
UNIT DIVISION: 0
UNIT DIVISION: 0
UNIT ISSUE DATE/TIME: NORMAL
UNIT ISSUE DATE/TIME: NORMAL
WBC OTHER # BLD: 10.8 K/UL (ref 4.5–11.5)

## 2025-02-19 PROCEDURE — 6370000000 HC RX 637 (ALT 250 FOR IP): Performed by: INTERNAL MEDICINE

## 2025-02-19 PROCEDURE — 6360000004 HC RX CONTRAST MEDICATION

## 2025-02-19 PROCEDURE — C8929 TTE W OR WO FOL WCON,DOPPLER: HCPCS

## 2025-02-19 PROCEDURE — 85027 COMPLETE CBC AUTOMATED: CPT

## 2025-02-19 PROCEDURE — 6370000000 HC RX 637 (ALT 250 FOR IP): Performed by: STUDENT IN AN ORGANIZED HEALTH CARE EDUCATION/TRAINING PROGRAM

## 2025-02-19 PROCEDURE — 6360000002 HC RX W HCPCS: Performed by: STUDENT IN AN ORGANIZED HEALTH CARE EDUCATION/TRAINING PROGRAM

## 2025-02-19 PROCEDURE — 80048 BASIC METABOLIC PNL TOTAL CA: CPT

## 2025-02-19 PROCEDURE — 82140 ASSAY OF AMMONIA: CPT

## 2025-02-19 PROCEDURE — 85018 HEMOGLOBIN: CPT

## 2025-02-19 PROCEDURE — 99233 SBSQ HOSP IP/OBS HIGH 50: CPT | Performed by: STUDENT IN AN ORGANIZED HEALTH CARE EDUCATION/TRAINING PROGRAM

## 2025-02-19 PROCEDURE — 2580000003 HC RX 258: Performed by: FAMILY MEDICINE

## 2025-02-19 PROCEDURE — 93306 TTE W/DOPPLER COMPLETE: CPT | Performed by: INTERNAL MEDICINE

## 2025-02-19 PROCEDURE — 6360000002 HC RX W HCPCS: Performed by: FAMILY MEDICINE

## 2025-02-19 PROCEDURE — 85610 PROTHROMBIN TIME: CPT

## 2025-02-19 PROCEDURE — 85014 HEMATOCRIT: CPT

## 2025-02-19 PROCEDURE — 36415 COLL VENOUS BLD VENIPUNCTURE: CPT

## 2025-02-19 PROCEDURE — 99233 SBSQ HOSP IP/OBS HIGH 50: CPT | Performed by: INTERNAL MEDICINE

## 2025-02-19 PROCEDURE — 87449 NOS EACH ORGANISM AG IA: CPT

## 2025-02-19 PROCEDURE — 2060000000 HC ICU INTERMEDIATE R&B

## 2025-02-19 PROCEDURE — 87324 CLOSTRIDIUM AG IA: CPT

## 2025-02-19 PROCEDURE — 6360000002 HC RX W HCPCS: Performed by: INTERNAL MEDICINE

## 2025-02-19 PROCEDURE — 2700000000 HC OXYGEN THERAPY PER DAY

## 2025-02-19 PROCEDURE — 83880 ASSAY OF NATRIURETIC PEPTIDE: CPT

## 2025-02-19 PROCEDURE — 6370000000 HC RX 637 (ALT 250 FOR IP): Performed by: FAMILY MEDICINE

## 2025-02-19 PROCEDURE — 2580000003 HC RX 258: Performed by: STUDENT IN AN ORGANIZED HEALTH CARE EDUCATION/TRAINING PROGRAM

## 2025-02-19 PROCEDURE — 99222 1ST HOSP IP/OBS MODERATE 55: CPT

## 2025-02-19 PROCEDURE — 2500000003 HC RX 250 WO HCPCS: Performed by: FAMILY MEDICINE

## 2025-02-19 PROCEDURE — 94660 CPAP INITIATION&MGMT: CPT

## 2025-02-19 PROCEDURE — 82805 BLOOD GASES W/O2 SATURATION: CPT

## 2025-02-19 PROCEDURE — 71045 X-RAY EXAM CHEST 1 VIEW: CPT

## 2025-02-19 PROCEDURE — 82962 GLUCOSE BLOOD TEST: CPT

## 2025-02-19 PROCEDURE — 92610 EVALUATE SWALLOWING FUNCTION: CPT

## 2025-02-19 RX ORDER — POTASSIUM CHLORIDE 1500 MG/1
40 TABLET, EXTENDED RELEASE ORAL ONCE
Status: COMPLETED | OUTPATIENT
Start: 2025-02-19 | End: 2025-02-19

## 2025-02-19 RX ORDER — POTASSIUM CHLORIDE 1500 MG/1
40 TABLET, EXTENDED RELEASE ORAL 2 TIMES DAILY WITH MEALS
Status: ACTIVE | OUTPATIENT
Start: 2025-02-19 | End: 2025-02-20

## 2025-02-19 RX ORDER — LACTULOSE 10 G/15ML
20 SOLUTION ORAL 3 TIMES DAILY
Status: DISCONTINUED | OUTPATIENT
Start: 2025-02-19 | End: 2025-03-04

## 2025-02-19 RX ORDER — FUROSEMIDE 10 MG/ML
20 INJECTION INTRAMUSCULAR; INTRAVENOUS ONCE
Status: COMPLETED | OUTPATIENT
Start: 2025-02-19 | End: 2025-02-19

## 2025-02-19 RX ADMIN — POTASSIUM CHLORIDE 10 MEQ: 7.46 INJECTION, SOLUTION INTRAVENOUS at 09:39

## 2025-02-19 RX ADMIN — LACTULOSE 20 G: 10 SOLUTION ORAL at 15:43

## 2025-02-19 RX ADMIN — LORAZEPAM 1 MG: 1 TABLET ORAL at 00:03

## 2025-02-19 RX ADMIN — POTASSIUM CHLORIDE 40 MEQ: 1500 TABLET, EXTENDED RELEASE ORAL at 01:40

## 2025-02-19 RX ADMIN — SODIUM CHLORIDE: 9 INJECTION, SOLUTION INTRAVENOUS at 01:07

## 2025-02-19 RX ADMIN — LACTULOSE 20 G: 10 SOLUTION ORAL at 20:58

## 2025-02-19 RX ADMIN — LORAZEPAM 1 MG: 1 TABLET ORAL at 06:23

## 2025-02-19 RX ADMIN — SULFUR HEXAFLUORIDE 2 ML: 60.7; .19; .19 INJECTION, POWDER, LYOPHILIZED, FOR SUSPENSION INTRAVENOUS; INTRAVESICAL at 16:30

## 2025-02-19 RX ADMIN — POTASSIUM CHLORIDE 10 MEQ: 7.46 INJECTION, SOLUTION INTRAVENOUS at 12:55

## 2025-02-19 RX ADMIN — POTASSIUM CHLORIDE 10 MEQ: 7.46 INJECTION, SOLUTION INTRAVENOUS at 10:33

## 2025-02-19 RX ADMIN — LORAZEPAM 1 MG: 1 TABLET ORAL at 15:43

## 2025-02-19 RX ADMIN — POTASSIUM CHLORIDE 10 MEQ: 7.46 INJECTION, SOLUTION INTRAVENOUS at 01:06

## 2025-02-19 RX ADMIN — SODIUM CHLORIDE, PRESERVATIVE FREE 10 ML: 5 INJECTION INTRAVENOUS at 20:58

## 2025-02-19 RX ADMIN — POTASSIUM CHLORIDE 10 MEQ: 7.46 INJECTION, SOLUTION INTRAVENOUS at 08:28

## 2025-02-19 RX ADMIN — POTASSIUM CHLORIDE 40 MEQ: 1500 TABLET, EXTENDED RELEASE ORAL at 01:39

## 2025-02-19 RX ADMIN — FUROSEMIDE 20 MG: 10 INJECTION, SOLUTION INTRAMUSCULAR; INTRAVENOUS at 15:43

## 2025-02-19 RX ADMIN — RIFAXIMIN 400 MG: 200 TABLET ORAL at 20:58

## 2025-02-19 RX ADMIN — POTASSIUM CHLORIDE 40 MEQ: 1500 TABLET, EXTENDED RELEASE ORAL at 15:43

## 2025-02-19 RX ADMIN — SODIUM CHLORIDE, PRESERVATIVE FREE 40 MG: 5 INJECTION INTRAVENOUS at 20:58

## 2025-02-19 RX ADMIN — POTASSIUM CHLORIDE 10 MEQ: 7.46 INJECTION, SOLUTION INTRAVENOUS at 11:56

## 2025-02-19 RX ADMIN — RIFAXIMIN 400 MG: 200 TABLET ORAL at 15:49

## 2025-02-19 RX ADMIN — POTASSIUM CHLORIDE 10 MEQ: 7.46 INJECTION, SOLUTION INTRAVENOUS at 13:45

## 2025-02-19 RX ADMIN — SODIUM CHLORIDE, PRESERVATIVE FREE 40 MG: 5 INJECTION INTRAVENOUS at 08:20

## 2025-02-19 RX ADMIN — FUROSEMIDE 20 MG: 10 INJECTION, SOLUTION INTRAMUSCULAR; INTRAVENOUS at 08:20

## 2025-02-19 RX ADMIN — SODIUM CHLORIDE, PRESERVATIVE FREE 10 ML: 5 INJECTION INTRAVENOUS at 08:30

## 2025-02-19 RX ADMIN — Medication 100 MG: at 15:44

## 2025-02-20 ENCOUNTER — APPOINTMENT (OUTPATIENT)
Dept: GENERAL RADIOLOGY | Age: 60
DRG: 207 | End: 2025-02-20
Payer: MEDICARE

## 2025-02-20 LAB
ALBUMIN SERPL-MCNC: 3.4 G/DL (ref 3.5–5.2)
ALP SERPL-CCNC: 193 U/L (ref 40–129)
ALT SERPL-CCNC: 20 U/L (ref 0–40)
AMMONIA PLAS-SCNC: 48 UMOL/L (ref 16–60)
ANION GAP SERPL CALCULATED.3IONS-SCNC: 15 MMOL/L (ref 7–16)
AST SERPL-CCNC: 34 U/L (ref 0–39)
BILIRUB DIRECT SERPL-MCNC: 1.8 MG/DL (ref 0–0.3)
BILIRUB INDIRECT SERPL-MCNC: 1.4 MG/DL (ref 0–1)
BILIRUB SERPL-MCNC: 3.2 MG/DL (ref 0–1.2)
BUN SERPL-MCNC: 42 MG/DL (ref 6–20)
C DIFF GDH + TOXINS A+B STL QL IA.RAPID: NEGATIVE
CALCIUM SERPL-MCNC: 9 MG/DL (ref 8.6–10.2)
CHLORIDE SERPL-SCNC: 94 MMOL/L (ref 98–107)
CO2 SERPL-SCNC: 29 MMOL/L (ref 22–29)
CREAT SERPL-MCNC: 2.4 MG/DL (ref 0.7–1.2)
ERYTHROCYTE [DISTWIDTH] IN BLOOD BY AUTOMATED COUNT: 20.8 % (ref 11.5–15)
GFR, ESTIMATED: 31 ML/MIN/1.73M2
GLUCOSE SERPL-MCNC: 93 MG/DL (ref 74–99)
HCT VFR BLD AUTO: 24 % (ref 37–54)
HGB BLD-MCNC: 7.4 G/DL (ref 12.5–16.5)
INR PPP: 2.8
MCH RBC QN AUTO: 29 PG (ref 26–35)
MCHC RBC AUTO-ENTMCNC: 30.8 G/DL (ref 32–34.5)
MCV RBC AUTO: 94.1 FL (ref 80–99.9)
PLATELET CONFIRMATION: NORMAL
PLATELET, FLUORESCENCE: 70 K/UL (ref 130–450)
PMV BLD AUTO: 12.1 FL (ref 7–12)
POTASSIUM SERPL-SCNC: 3.3 MMOL/L (ref 3.5–5)
PROT SERPL-MCNC: 6.8 G/DL (ref 6.4–8.3)
PROTHROMBIN TIME: 31 SEC (ref 9.3–12.4)
RBC # BLD AUTO: 2.55 M/UL (ref 3.8–5.8)
SODIUM SERPL-SCNC: 138 MMOL/L (ref 132–146)
SPECIMEN DESCRIPTION: NORMAL
WBC OTHER # BLD: 9.3 K/UL (ref 4.5–11.5)

## 2025-02-20 PROCEDURE — 99233 SBSQ HOSP IP/OBS HIGH 50: CPT | Performed by: INTERNAL MEDICINE

## 2025-02-20 PROCEDURE — 74230 X-RAY XM SWLNG FUNCJ C+: CPT

## 2025-02-20 PROCEDURE — 82248 BILIRUBIN DIRECT: CPT

## 2025-02-20 PROCEDURE — 2700000000 HC OXYGEN THERAPY PER DAY

## 2025-02-20 PROCEDURE — 92526 ORAL FUNCTION THERAPY: CPT

## 2025-02-20 PROCEDURE — 2580000003 HC RX 258: Performed by: STUDENT IN AN ORGANIZED HEALTH CARE EDUCATION/TRAINING PROGRAM

## 2025-02-20 PROCEDURE — 92611 MOTION FLUOROSCOPY/SWALLOW: CPT

## 2025-02-20 PROCEDURE — 2500000003 HC RX 250 WO HCPCS: Performed by: FAMILY MEDICINE

## 2025-02-20 PROCEDURE — 36415 COLL VENOUS BLD VENIPUNCTURE: CPT

## 2025-02-20 PROCEDURE — 80053 COMPREHEN METABOLIC PANEL: CPT

## 2025-02-20 PROCEDURE — 82140 ASSAY OF AMMONIA: CPT

## 2025-02-20 PROCEDURE — 85027 COMPLETE CBC AUTOMATED: CPT

## 2025-02-20 PROCEDURE — 99233 SBSQ HOSP IP/OBS HIGH 50: CPT | Performed by: STUDENT IN AN ORGANIZED HEALTH CARE EDUCATION/TRAINING PROGRAM

## 2025-02-20 PROCEDURE — 85610 PROTHROMBIN TIME: CPT

## 2025-02-20 PROCEDURE — 2500000003 HC RX 250 WO HCPCS: Performed by: PHYSICIAN ASSISTANT

## 2025-02-20 PROCEDURE — 94660 CPAP INITIATION&MGMT: CPT

## 2025-02-20 PROCEDURE — 99233 SBSQ HOSP IP/OBS HIGH 50: CPT

## 2025-02-20 PROCEDURE — 6370000000 HC RX 637 (ALT 250 FOR IP): Performed by: STUDENT IN AN ORGANIZED HEALTH CARE EDUCATION/TRAINING PROGRAM

## 2025-02-20 PROCEDURE — 6360000002 HC RX W HCPCS: Performed by: STUDENT IN AN ORGANIZED HEALTH CARE EDUCATION/TRAINING PROGRAM

## 2025-02-20 PROCEDURE — 2060000000 HC ICU INTERMEDIATE R&B

## 2025-02-20 PROCEDURE — 6370000000 HC RX 637 (ALT 250 FOR IP): Performed by: FAMILY MEDICINE

## 2025-02-20 PROCEDURE — 99232 SBSQ HOSP IP/OBS MODERATE 35: CPT | Performed by: NURSE PRACTITIONER

## 2025-02-20 RX ADMIN — LACTULOSE 20 G: 10 SOLUTION ORAL at 15:10

## 2025-02-20 RX ADMIN — SODIUM CHLORIDE, PRESERVATIVE FREE 40 MG: 5 INJECTION INTRAVENOUS at 10:36

## 2025-02-20 RX ADMIN — BARIUM SULFATE 15 ML: 0.81 POWDER, FOR SUSPENSION ORAL at 09:42

## 2025-02-20 RX ADMIN — BARIUM SULFATE 15 ML: 400 PASTE ORAL at 09:41

## 2025-02-20 RX ADMIN — BARIUM SULFATE 15 ML: 400 SUSPENSION ORAL at 09:41

## 2025-02-20 RX ADMIN — Medication 100 MG: at 10:35

## 2025-02-20 RX ADMIN — RIFAXIMIN 400 MG: 200 TABLET ORAL at 21:12

## 2025-02-20 RX ADMIN — LACTULOSE 20 G: 10 SOLUTION ORAL at 21:12

## 2025-02-20 RX ADMIN — LACTULOSE 20 G: 10 SOLUTION ORAL at 10:37

## 2025-02-20 RX ADMIN — RIFAXIMIN 400 MG: 200 TABLET ORAL at 10:36

## 2025-02-20 RX ADMIN — Medication 400 MG: at 10:35

## 2025-02-20 RX ADMIN — SODIUM CHLORIDE, PRESERVATIVE FREE 40 MG: 5 INJECTION INTRAVENOUS at 21:12

## 2025-02-20 RX ADMIN — SODIUM CHLORIDE, PRESERVATIVE FREE 10 ML: 5 INJECTION INTRAVENOUS at 10:15

## 2025-02-20 RX ADMIN — SODIUM CHLORIDE, PRESERVATIVE FREE 10 ML: 5 INJECTION INTRAVENOUS at 10:17

## 2025-02-20 RX ADMIN — LORAZEPAM 1 MG: 1 TABLET ORAL at 10:38

## 2025-02-20 RX ADMIN — SODIUM CHLORIDE, PRESERVATIVE FREE 10 ML: 5 INJECTION INTRAVENOUS at 21:13

## 2025-02-20 RX ADMIN — RIFAXIMIN 400 MG: 200 TABLET ORAL at 15:10

## 2025-02-20 NOTE — DISCHARGE INSTRUCTIONS
***HEART FAILURE - CONGESTIVE HEART FAILURE***  DISCHARGE INSTRUCTIONS:  GUIDELINES TO FOLLOW AT CAR/LTAC/SNF/ Assisted Living    Future Appointments   Date Time Provider Department Center   3/20/2025  8:00 AM [facility] ROOM 3 [facility] [place] [facility]          MEDICATIONS:  Please notify the doctor if patient is not able to take their medications or if medications are being held for any reasons (such as low blood pressure ect.)  Do not give the patient ibuprofen (Advil or Motrin), naproxen (Aleve) without talking to the doctor first. This could make their heart failure worse.         WEIGHT MONITORING:   Weigh patient every day in the morning after they void (If patient is able to stand, please get a standing weight.)   Notify the doctor of a weight gain of 3 pounds or more in 1 day   OR  a total of 5 pounds or more in 1 week             DIET   Cardiac heart healthy diet:  Low sodium diet: no  more than 2,000mg (2 grams) of salt / sodium per day (which equals to a little less than  a teaspoon of salt)/ Cardiac Diet: Low saturated / low trans fat, no added salt, caffeine restricted    If patient is there for rehab and will be returning home in the near future; reinforce with the patient and the family to follow a low sodium diet (2,000 mg)- avoid using salt at the table, avoid / limit use of canned soups, processed / packaged foods, salted snacks, olives and pickles.  Do not use a salt substitute without checking with the doctor. (Mrs. Plummer is safe to use).       NOTIFY THE DOCTOR THE FIRST DAY OF ONSET OF ANY OF THESE   SYMPTOMS:   Weight gain of 3 pounds or more in 1 day         OR 5 pounds or more in one week  More shortness of breath  More swelling in stomach, legs, ankles or feet  Feeling more tired, No energy  Dry hacky cough  Dizziness  More chest pain / discomfort  Hard time breathing laying down

## 2025-02-20 NOTE — NURSE NAVIGATOR
Patient's chart updated to reflect:      .    - HF care plan, HF education points and HF discharge instructions.  -Orders: 2 gram sodium diet, daily weights, I/O.  -PCP or cardiology follow up appointments to be scheduled within 7 days of hospital discharge.  -CHF education session will be provided to the patient prior to hospital discharge.     Shawnee Tony RN, CHFN   Heart Failure Navigator

## 2025-02-21 ENCOUNTER — ANESTHESIA EVENT (OUTPATIENT)
Dept: ENDOSCOPY | Age: 60
DRG: 207 | End: 2025-02-21
Payer: MEDICARE

## 2025-02-21 ENCOUNTER — ANESTHESIA (OUTPATIENT)
Dept: ENDOSCOPY | Age: 60
DRG: 207 | End: 2025-02-21
Payer: MEDICARE

## 2025-02-21 ENCOUNTER — APPOINTMENT (OUTPATIENT)
Dept: GENERAL RADIOLOGY | Age: 60
DRG: 207 | End: 2025-02-21
Payer: MEDICARE

## 2025-02-21 LAB
ACB COMPLEX DNA BLD POS QL NAA+NON-PROBE: NOT DETECTED
ALBUMIN SERPL-MCNC: 3.6 G/DL (ref 3.5–5.2)
ALP SERPL-CCNC: 208 U/L (ref 40–129)
ALT SERPL-CCNC: 22 U/L (ref 0–40)
AMMONIA PLAS-SCNC: 44 UMOL/L (ref 16–60)
ANION GAP SERPL CALCULATED.3IONS-SCNC: 14 MMOL/L (ref 7–16)
AST SERPL-CCNC: 44 U/L (ref 0–39)
B FRAGILIS DNA BLD POS QL NAA+NON-PROBE: NOT DETECTED
B.E.: 7.2 MMOL/L (ref -3–3)
BASOPHILS # BLD: 0 K/UL (ref 0–0.2)
BASOPHILS NFR BLD: 0 % (ref 0–2)
BILIRUB DIRECT SERPL-MCNC: 2.3 MG/DL (ref 0–0.3)
BILIRUB INDIRECT SERPL-MCNC: 1.1 MG/DL (ref 0–1)
BILIRUB SERPL-MCNC: 3.4 MG/DL (ref 0–1.2)
BIOFIRE TEST COMMENT: ABNORMAL
BUN SERPL-MCNC: 37 MG/DL (ref 6–20)
C ALBICANS DNA BLD POS QL NAA+NON-PROBE: NOT DETECTED
C AURIS DNA BLD POS QL NAA+NON-PROBE: NOT DETECTED
C GATTII+NEOFOR DNA BLD POS QL NAA+N-PRB: NOT DETECTED
C GLABRATA DNA BLD POS QL NAA+NON-PROBE: NOT DETECTED
C KRUSEI DNA BLD POS QL NAA+NON-PROBE: NOT DETECTED
C PARAP DNA BLD POS QL NAA+NON-PROBE: NOT DETECTED
C TROPICLS DNA BLD POS QL NAA+NON-PROBE: NOT DETECTED
CALCIUM SERPL-MCNC: 9.3 MG/DL (ref 8.6–10.2)
CHLORIDE SERPL-SCNC: 98 MMOL/L (ref 98–107)
CO2 SERPL-SCNC: 31 MMOL/L (ref 22–29)
COHB: 1.8 % (ref 0–1.5)
CREAT SERPL-MCNC: 1.9 MG/DL (ref 0.7–1.2)
CRITICAL: ABNORMAL
DATE ANALYZED: ABNORMAL
DATE OF COLLECTION: ABNORMAL
E CLOAC COMP DNA BLD POS NAA+NON-PROBE: NOT DETECTED
E COLI DNA BLD POS QL NAA+NON-PROBE: NOT DETECTED
E FAECALIS DNA BLD POS QL NAA+NON-PROBE: NOT DETECTED
E FAECIUM DNA BLD POS QL NAA+NON-PROBE: NOT DETECTED
ENTEROBACTERALES DNA BLD POS NAA+N-PRB: NOT DETECTED
EOSINOPHIL # BLD: 0.06 K/UL (ref 0.05–0.5)
EOSINOPHILS RELATIVE PERCENT: 1 % (ref 0–6)
ERYTHROCYTE [DISTWIDTH] IN BLOOD BY AUTOMATED COUNT: 21.4 % (ref 11.5–15)
GFR, ESTIMATED: 42 ML/MIN/1.73M2
GLUCOSE SERPL-MCNC: 92 MG/DL (ref 74–99)
GP B STREP DNA BLD POS QL NAA+NON-PROBE: NOT DETECTED
HAEM INFLU DNA BLD POS QL NAA+NON-PROBE: NOT DETECTED
HCO3: 32.6 MMOL/L (ref 22–26)
HCT VFR BLD AUTO: 25.1 % (ref 37–54)
HGB BLD-MCNC: 7.6 G/DL (ref 12.5–16.5)
HHB: 3.9 % (ref 0–5)
INR PPP: 2.1
K OXYTOCA DNA BLD POS QL NAA+NON-PROBE: NOT DETECTED
KLEBSIELLA SP DNA BLD POS QL NAA+NON-PRB: NOT DETECTED
KLEBSIELLA SP DNA BLD POS QL NAA+NON-PRB: NOT DETECTED
L MONOCYTOG DNA BLD POS QL NAA+NON-PROBE: NOT DETECTED
LAB: ABNORMAL
LYMPHOCYTES NFR BLD: 0.39 K/UL (ref 1.5–4)
LYMPHOCYTES RELATIVE PERCENT: 5 % (ref 20–42)
Lab: 2340
MCH RBC QN AUTO: 28.8 PG (ref 26–35)
MCHC RBC AUTO-ENTMCNC: 30.3 G/DL (ref 32–34.5)
MCV RBC AUTO: 95.1 FL (ref 80–99.9)
METHB: 0.9 % (ref 0–1.5)
MICROORGANISM SPEC CULT: ABNORMAL
MICROORGANISM/AGENT SPEC: ABNORMAL
MODE: ABNORMAL
MONOCYTES NFR BLD: 0.26 K/UL (ref 0.1–0.95)
MONOCYTES NFR BLD: 4 % (ref 2–12)
N MEN DNA BLD POS QL NAA+NON-PROBE: NOT DETECTED
NEUTROPHILS NFR BLD: 90 % (ref 43–80)
NEUTS SEG NFR BLD: 6.69 K/UL (ref 1.8–7.3)
O2 SATURATION: 96 % (ref 92–98.5)
O2HB: 93.4 % (ref 94–97)
OPERATOR ID: 2593
P AERUGINOSA DNA BLD POS NAA+NON-PROBE: NOT DETECTED
PATIENT TEMP: 37 C
PCO2: 51.1 MMHG (ref 35–45)
PH BLOOD GAS: 7.42 (ref 7.35–7.45)
PLATELET # BLD AUTO: 80 K/UL (ref 130–450)
PLATELET CONFIRMATION: NORMAL
PMV BLD AUTO: 11.6 FL (ref 7–12)
PO2: 85.5 MMHG (ref 75–100)
POTASSIUM SERPL-SCNC: 3.4 MMOL/L (ref 3.5–5)
PROT SERPL-MCNC: 6.9 G/DL (ref 6.4–8.3)
PROTEUS SP DNA BLD POS QL NAA+NON-PROBE: NOT DETECTED
PROTHROMBIN TIME: 23.4 SEC (ref 9.3–12.4)
RBC # BLD AUTO: 2.64 M/UL (ref 3.8–5.8)
RBC # BLD: ABNORMAL 10*6/UL
S AUREUS DNA BLD POS QL NAA+NON-PROBE: NOT DETECTED
S AUREUS+CONS DNA BLD POS NAA+NON-PROBE: NOT DETECTED
S EPIDERMIDIS DNA BLD POS QL NAA+NON-PRB: NOT DETECTED
S LUGDUNENSIS DNA BLD POS QL NAA+NON-PRB: NOT DETECTED
S MALTOPHILIA DNA BLD POS QL NAA+NON-PRB: NOT DETECTED
S MARCESCENS DNA BLD POS NAA+NON-PROBE: NOT DETECTED
S PNEUM DNA BLD POS QL NAA+NON-PROBE: NOT DETECTED
S PYO DNA BLD POS QL NAA+NON-PROBE: NOT DETECTED
SALMONELLA DNA BLD POS QL NAA+NON-PROBE: NOT DETECTED
SERVICE CMNT-IMP: ABNORMAL
SODIUM SERPL-SCNC: 143 MMOL/L (ref 132–146)
SOURCE, BLOOD GAS: ABNORMAL
SPECIMEN DESCRIPTION: ABNORMAL
STREPTOCOCCUS DNA BLD POS NAA+NON-PROBE: DETECTED
THB: 8.5 G/DL (ref 11.5–16.5)
TIME ANALYZED: 2354
WBC OTHER # BLD: 7.4 K/UL (ref 4.5–11.5)

## 2025-02-21 PROCEDURE — 82140 ASSAY OF AMMONIA: CPT

## 2025-02-21 PROCEDURE — 93005 ELECTROCARDIOGRAM TRACING: CPT | Performed by: INTERNAL MEDICINE

## 2025-02-21 PROCEDURE — 94660 CPAP INITIATION&MGMT: CPT

## 2025-02-21 PROCEDURE — 6370000000 HC RX 637 (ALT 250 FOR IP): Performed by: STUDENT IN AN ORGANIZED HEALTH CARE EDUCATION/TRAINING PROGRAM

## 2025-02-21 PROCEDURE — 2500000003 HC RX 250 WO HCPCS: Performed by: STUDENT IN AN ORGANIZED HEALTH CARE EDUCATION/TRAINING PROGRAM

## 2025-02-21 PROCEDURE — 74018 RADEX ABDOMEN 1 VIEW: CPT

## 2025-02-21 PROCEDURE — 85730 THROMBOPLASTIN TIME PARTIAL: CPT

## 2025-02-21 PROCEDURE — 6370000000 HC RX 637 (ALT 250 FOR IP): Performed by: FAMILY MEDICINE

## 2025-02-21 PROCEDURE — 82805 BLOOD GASES W/O2 SATURATION: CPT

## 2025-02-21 PROCEDURE — 36415 COLL VENOUS BLD VENIPUNCTURE: CPT

## 2025-02-21 PROCEDURE — 2500000003 HC RX 250 WO HCPCS: Performed by: FAMILY MEDICINE

## 2025-02-21 PROCEDURE — 99233 SBSQ HOSP IP/OBS HIGH 50: CPT | Performed by: INTERNAL MEDICINE

## 2025-02-21 PROCEDURE — 80053 COMPREHEN METABOLIC PANEL: CPT

## 2025-02-21 PROCEDURE — 83880 ASSAY OF NATRIURETIC PEPTIDE: CPT

## 2025-02-21 PROCEDURE — 6360000002 HC RX W HCPCS

## 2025-02-21 PROCEDURE — 99232 SBSQ HOSP IP/OBS MODERATE 35: CPT | Performed by: STUDENT IN AN ORGANIZED HEALTH CARE EDUCATION/TRAINING PROGRAM

## 2025-02-21 PROCEDURE — 6360000002 HC RX W HCPCS: Performed by: STUDENT IN AN ORGANIZED HEALTH CARE EDUCATION/TRAINING PROGRAM

## 2025-02-21 PROCEDURE — 99233 SBSQ HOSP IP/OBS HIGH 50: CPT | Performed by: STUDENT IN AN ORGANIZED HEALTH CARE EDUCATION/TRAINING PROGRAM

## 2025-02-21 PROCEDURE — 2580000003 HC RX 258: Performed by: STUDENT IN AN ORGANIZED HEALTH CARE EDUCATION/TRAINING PROGRAM

## 2025-02-21 PROCEDURE — 85610 PROTHROMBIN TIME: CPT

## 2025-02-21 PROCEDURE — 82248 BILIRUBIN DIRECT: CPT

## 2025-02-21 PROCEDURE — 2000000000 HC ICU R&B

## 2025-02-21 PROCEDURE — 2500000003 HC RX 250 WO HCPCS

## 2025-02-21 PROCEDURE — 85025 COMPLETE CBC W/AUTO DIFF WBC: CPT

## 2025-02-21 PROCEDURE — 6360000002 HC RX W HCPCS: Performed by: FAMILY MEDICINE

## 2025-02-21 RX ORDER — PHENOBARBITAL SODIUM 65 MG/ML
16.2 INJECTION, SOLUTION INTRAMUSCULAR; INTRAVENOUS EVERY 12 HOURS
Status: COMPLETED | OUTPATIENT
Start: 2025-02-24 | End: 2025-02-25

## 2025-02-21 RX ORDER — ACETAMINOPHEN 650 MG/1
650 SUPPOSITORY RECTAL EVERY 6 HOURS PRN
Status: DISCONTINUED | OUTPATIENT
Start: 2025-02-21 | End: 2025-03-04

## 2025-02-21 RX ORDER — LORAZEPAM 1 MG/1
4 TABLET ORAL
Status: DISCONTINUED | OUTPATIENT
Start: 2025-02-21 | End: 2025-02-24

## 2025-02-21 RX ORDER — SODIUM CHLORIDE 9 MG/ML
INJECTION, SOLUTION INTRAVENOUS PRN
Status: DISCONTINUED | OUTPATIENT
Start: 2025-02-21 | End: 2025-03-04

## 2025-02-21 RX ORDER — HEPARIN SODIUM 1000 [USP'U]/ML
4000 INJECTION, SOLUTION INTRAVENOUS; SUBCUTANEOUS PRN
Status: DISCONTINUED | OUTPATIENT
Start: 2025-02-21 | End: 2025-03-16

## 2025-02-21 RX ORDER — PHENOBARBITAL SODIUM 65 MG/ML
32.5 INJECTION, SOLUTION INTRAMUSCULAR; INTRAVENOUS EVERY 6 HOURS
Status: COMPLETED | OUTPATIENT
Start: 2025-02-22 | End: 2025-02-23

## 2025-02-21 RX ORDER — LORAZEPAM 2 MG/ML
4 INJECTION INTRAMUSCULAR
Status: DISCONTINUED | OUTPATIENT
Start: 2025-02-21 | End: 2025-02-24

## 2025-02-21 RX ORDER — HEPARIN SODIUM 1000 [USP'U]/ML
2000 INJECTION, SOLUTION INTRAVENOUS; SUBCUTANEOUS PRN
Status: DISCONTINUED | OUTPATIENT
Start: 2025-02-21 | End: 2025-03-16

## 2025-02-21 RX ORDER — LORAZEPAM 1 MG/1
2 TABLET ORAL
Status: DISCONTINUED | OUTPATIENT
Start: 2025-02-21 | End: 2025-02-27

## 2025-02-21 RX ORDER — LORAZEPAM 2 MG/ML
3 INJECTION INTRAMUSCULAR
Status: DISCONTINUED | OUTPATIENT
Start: 2025-02-21 | End: 2025-02-24

## 2025-02-21 RX ORDER — THIAMINE HYDROCHLORIDE 100 MG/ML
250 INJECTION, SOLUTION INTRAMUSCULAR; INTRAVENOUS DAILY
Status: COMPLETED | OUTPATIENT
Start: 2025-02-23 | End: 2025-02-27

## 2025-02-21 RX ORDER — DEXTROSE, SODIUM CHLORIDE, SODIUM LACTATE, POTASSIUM CHLORIDE, AND CALCIUM CHLORIDE 5; .6; .31; .03; .02 G/100ML; G/100ML; G/100ML; G/100ML; G/100ML
INJECTION, SOLUTION INTRAVENOUS CONTINUOUS
Status: ACTIVE | OUTPATIENT
Start: 2025-02-21 | End: 2025-02-21

## 2025-02-21 RX ORDER — LORAZEPAM 2 MG/ML
2 INJECTION INTRAMUSCULAR
Status: DISCONTINUED | OUTPATIENT
Start: 2025-02-21 | End: 2025-02-27

## 2025-02-21 RX ORDER — LORAZEPAM 1 MG/1
3 TABLET ORAL
Status: DISCONTINUED | OUTPATIENT
Start: 2025-02-21 | End: 2025-02-24

## 2025-02-21 RX ORDER — ACETAMINOPHEN 325 MG/1
650 TABLET ORAL EVERY 6 HOURS PRN
Status: DISCONTINUED | OUTPATIENT
Start: 2025-02-21 | End: 2025-03-04

## 2025-02-21 RX ORDER — PHENOBARBITAL 32.4 MG/1
16.2 TABLET ORAL EVERY 6 HOURS PRN
Status: DISCONTINUED | OUTPATIENT
Start: 2025-02-23 | End: 2025-02-21

## 2025-02-21 RX ORDER — HEPARIN SODIUM 10000 [USP'U]/100ML
5-30 INJECTION, SOLUTION INTRAVENOUS CONTINUOUS
Status: DISCONTINUED | OUTPATIENT
Start: 2025-02-21 | End: 2025-03-16

## 2025-02-21 RX ORDER — PHENOBARBITAL 32.4 MG/1
32.4 TABLET ORAL 4 TIMES DAILY
Status: DISCONTINUED | OUTPATIENT
Start: 2025-02-21 | End: 2025-02-21

## 2025-02-21 RX ORDER — PHENOBARBITAL 32.4 MG/1
16.2 TABLET ORAL 2 TIMES DAILY
Status: DISCONTINUED | OUTPATIENT
Start: 2025-02-23 | End: 2025-02-21

## 2025-02-21 RX ORDER — PHENOBARBITAL SODIUM 65 MG/ML
65 INJECTION, SOLUTION INTRAMUSCULAR; INTRAVENOUS EVERY 6 HOURS
Status: COMPLETED | OUTPATIENT
Start: 2025-02-21 | End: 2025-02-22

## 2025-02-21 RX ORDER — SODIUM CHLORIDE 0.9 % (FLUSH) 0.9 %
5-40 SYRINGE (ML) INJECTION PRN
Status: DISCONTINUED | OUTPATIENT
Start: 2025-02-21 | End: 2025-03-04

## 2025-02-21 RX ORDER — PHENOBARBITAL 32.4 MG/1
32.4 TABLET ORAL EVERY 6 HOURS PRN
Status: DISCONTINUED | OUTPATIENT
Start: 2025-02-21 | End: 2025-02-21

## 2025-02-21 RX ORDER — THIAMINE HYDROCHLORIDE 100 MG/ML
100 INJECTION, SOLUTION INTRAMUSCULAR; INTRAVENOUS DAILY
Status: DISCONTINUED | OUTPATIENT
Start: 2025-03-01 | End: 2025-03-10

## 2025-02-21 RX ORDER — THIAMINE HYDROCHLORIDE 100 MG/ML
500 INJECTION, SOLUTION INTRAMUSCULAR; INTRAVENOUS EVERY 8 HOURS
Status: COMPLETED | OUTPATIENT
Start: 2025-02-21 | End: 2025-02-23

## 2025-02-21 RX ORDER — LORAZEPAM 1 MG/1
1 TABLET ORAL
Status: DISCONTINUED | OUTPATIENT
Start: 2025-02-21 | End: 2025-02-27

## 2025-02-21 RX ORDER — POLYETHYLENE GLYCOL 3350 17 G/17G
17 POWDER, FOR SOLUTION ORAL DAILY PRN
Status: DISCONTINUED | OUTPATIENT
Start: 2025-02-21 | End: 2025-03-04

## 2025-02-21 RX ORDER — LORAZEPAM 2 MG/ML
1 INJECTION INTRAMUSCULAR
Status: DISCONTINUED | OUTPATIENT
Start: 2025-02-21 | End: 2025-02-27

## 2025-02-21 RX ORDER — SODIUM CHLORIDE 0.9 % (FLUSH) 0.9 %
5-40 SYRINGE (ML) INJECTION EVERY 12 HOURS SCHEDULED
Status: DISCONTINUED | OUTPATIENT
Start: 2025-02-21 | End: 2025-03-17 | Stop reason: HOSPADM

## 2025-02-21 RX ORDER — PHENOBARBITAL SODIUM 65 MG/ML
32.5 INJECTION, SOLUTION INTRAMUSCULAR; INTRAVENOUS EVERY 12 HOURS
Status: COMPLETED | OUTPATIENT
Start: 2025-02-23 | End: 2025-02-24

## 2025-02-21 RX ORDER — PHENOBARBITAL 32.4 MG/1
32.4 TABLET ORAL 2 TIMES DAILY
Status: DISCONTINUED | OUTPATIENT
Start: 2025-02-22 | End: 2025-02-21

## 2025-02-21 RX ADMIN — PHENOBARBITAL 32.4 MG: 32.4 TABLET ORAL at 22:47

## 2025-02-21 RX ADMIN — Medication 400 MG: at 09:11

## 2025-02-21 RX ADMIN — LORAZEPAM 2 MG: 2 INJECTION INTRAMUSCULAR; INTRAVENOUS at 22:33

## 2025-02-21 RX ADMIN — SODIUM CHLORIDE, PRESERVATIVE FREE 10 ML: 5 INJECTION INTRAVENOUS at 22:37

## 2025-02-21 RX ADMIN — SODIUM CHLORIDE, PRESERVATIVE FREE 10 ML: 5 INJECTION INTRAVENOUS at 22:38

## 2025-02-21 RX ADMIN — SODIUM CHLORIDE, PRESERVATIVE FREE 40 MG: 5 INJECTION INTRAVENOUS at 09:13

## 2025-02-21 RX ADMIN — SODIUM CHLORIDE, PRESERVATIVE FREE 40 MG: 5 INJECTION INTRAVENOUS at 22:40

## 2025-02-21 RX ADMIN — LORAZEPAM 2 MG: 1 TABLET ORAL at 09:33

## 2025-02-21 RX ADMIN — ZIPRASIDONE MESYLATE 10 MG: 20 INJECTION, POWDER, LYOPHILIZED, FOR SOLUTION INTRAMUSCULAR at 13:14

## 2025-02-21 RX ADMIN — SODIUM CHLORIDE, PRESERVATIVE FREE 10 ML: 5 INJECTION INTRAVENOUS at 09:25

## 2025-02-21 RX ADMIN — RIFAXIMIN 400 MG: 200 TABLET ORAL at 09:11

## 2025-02-21 RX ADMIN — LACTULOSE 20 G: 10 SOLUTION ORAL at 09:11

## 2025-02-21 RX ADMIN — PROCHLORPERAZINE EDISYLATE 10 MG: 5 INJECTION INTRAMUSCULAR; INTRAVENOUS at 22:41

## 2025-02-21 RX ADMIN — SODIUM CHLORIDE, SODIUM LACTATE, POTASSIUM CHLORIDE, CALCIUM CHLORIDE AND DEXTROSE MONOHYDRATE: 5; 600; 310; 30; 20 INJECTION, SOLUTION INTRAVENOUS at 11:47

## 2025-02-21 RX ADMIN — POTASSIUM CHLORIDE 40 MEQ: 1500 TABLET, EXTENDED RELEASE ORAL at 09:24

## 2025-02-21 RX ADMIN — PHENOBARBITAL 32.4 MG: 32.4 TABLET ORAL at 11:45

## 2025-02-21 RX ADMIN — Medication 100 MG: at 09:11

## 2025-02-21 ASSESSMENT — PAIN - FUNCTIONAL ASSESSMENT: PAIN_FUNCTIONAL_ASSESSMENT: ACTIVITIES ARE NOT PREVENTED

## 2025-02-21 ASSESSMENT — PAIN DESCRIPTION - LOCATION
LOCATION: GENERALIZED
LOCATION: BACK

## 2025-02-21 ASSESSMENT — PAIN SCALES - GENERAL
PAINLEVEL_OUTOF10: 8
PAINLEVEL_OUTOF10: 6

## 2025-02-21 ASSESSMENT — PAIN DESCRIPTION - DESCRIPTORS
DESCRIPTORS: THROBBING
DESCRIPTORS: THROBBING

## 2025-02-22 ENCOUNTER — APPOINTMENT (OUTPATIENT)
Dept: GENERAL RADIOLOGY | Age: 60
DRG: 207 | End: 2025-02-22
Payer: MEDICARE

## 2025-02-22 LAB
AADO2: 164.6 MMHG
ALBUMIN SERPL-MCNC: 3.6 G/DL (ref 3.5–5.2)
ALBUMIN SERPL-MCNC: 3.7 G/DL (ref 3.5–5.2)
ALP SERPL-CCNC: 201 U/L (ref 40–129)
ALP SERPL-CCNC: 206 U/L (ref 40–129)
ALT SERPL-CCNC: 25 U/L (ref 0–40)
ALT SERPL-CCNC: 27 U/L (ref 0–40)
AMMONIA PLAS-SCNC: 33 UMOL/L (ref 16–60)
ANION GAP SERPL CALCULATED.3IONS-SCNC: 10 MMOL/L (ref 7–16)
AST SERPL-CCNC: 52 U/L (ref 0–39)
AST SERPL-CCNC: 57 U/L (ref 0–39)
B.E.: 5.6 MMOL/L (ref -3–3)
BASOPHILS # BLD: 0.06 K/UL (ref 0–0.2)
BASOPHILS NFR BLD: 1 % (ref 0–2)
BILIRUB SERPL-MCNC: 3.4 MG/DL (ref 0–1.2)
BILIRUB SERPL-MCNC: 3.5 MG/DL (ref 0–1.2)
BNP SERPL-MCNC: 2316 PG/ML (ref 0–125)
BUN SERPL-MCNC: 32 MG/DL (ref 6–20)
BUN SERPL-MCNC: 33 MG/DL (ref 6–20)
BUN SERPL-MCNC: 33 MG/DL (ref 6–20)
CALCIUM SERPL-MCNC: 8.6 MG/DL (ref 8.6–10.2)
CALCIUM SERPL-MCNC: 8.8 MG/DL (ref 8.6–10.2)
CALCIUM SERPL-MCNC: 9 MG/DL (ref 8.6–10.2)
CHLORIDE SERPL-SCNC: 100 MMOL/L (ref 98–107)
CHLORIDE SERPL-SCNC: 101 MMOL/L (ref 98–107)
CHLORIDE SERPL-SCNC: 101 MMOL/L (ref 98–107)
CO2 SERPL-SCNC: 32 MMOL/L (ref 22–29)
COHB: 1.4 % (ref 0–1.5)
CREAT SERPL-MCNC: 1.7 MG/DL (ref 0.7–1.2)
CREAT SERPL-MCNC: 1.8 MG/DL (ref 0.7–1.2)
CREAT SERPL-MCNC: 2 MG/DL (ref 0.7–1.2)
CRITICAL: ABNORMAL
CRP SERPL HS-MCNC: 37 MG/L (ref 0–5)
DATE ANALYZED: ABNORMAL
DATE OF COLLECTION: ABNORMAL
EKG ATRIAL RATE: 83 BPM
EKG ATRIAL RATE: 95 BPM
EKG Q-T INTERVAL: 498 MS
EKG Q-T INTERVAL: 518 MS
EKG QRS DURATION: 176 MS
EKG QRS DURATION: 186 MS
EKG QTC CALCULATION (BAZETT): 602 MS
EKG QTC CALCULATION (BAZETT): 650 MS
EKG R AXIS: -33 DEGREES
EKG R AXIS: -44 DEGREES
EKG T AXIS: 120 DEGREES
EKG T AXIS: 122 DEGREES
EKG VENTRICULAR RATE: 88 BPM
EKG VENTRICULAR RATE: 95 BPM
EOSINOPHIL # BLD: 0.14 K/UL (ref 0.05–0.5)
EOSINOPHILS RELATIVE PERCENT: 2 % (ref 0–6)
ERYTHROCYTE [DISTWIDTH] IN BLOOD BY AUTOMATED COUNT: 22.5 % (ref 11.5–15)
ERYTHROCYTE [SEDIMENTATION RATE] IN BLOOD BY WESTERGREN METHOD: 30 MM/HR (ref 0–15)
FIO2: 50 %
FOLATE SERPL-MCNC: 5.3 NG/ML (ref 4.8–24.2)
GFR, ESTIMATED: 39 ML/MIN/1.73M2
GFR, ESTIMATED: 44 ML/MIN/1.73M2
GFR, ESTIMATED: 47 ML/MIN/1.73M2
GLUCOSE SERPL-MCNC: 100 MG/DL (ref 74–99)
GLUCOSE SERPL-MCNC: 108 MG/DL (ref 74–99)
GLUCOSE SERPL-MCNC: 96 MG/DL (ref 74–99)
HCO3: 30.6 MMOL/L (ref 22–26)
HCT VFR BLD AUTO: 25.1 % (ref 37–54)
HGB BLD-MCNC: 7.5 G/DL (ref 12.5–16.5)
HHB: 1.1 % (ref 0–5)
IMM GRANULOCYTES # BLD AUTO: 0.07 K/UL (ref 0–0.58)
IMM GRANULOCYTES NFR BLD: 1 % (ref 0–5)
INR PPP: 1.8
LAB: ABNORMAL
LYMPHOCYTES NFR BLD: 0.77 K/UL (ref 1.5–4)
LYMPHOCYTES RELATIVE PERCENT: 9 % (ref 20–42)
Lab: 539
MAGNESIUM SERPL-MCNC: 1.9 MG/DL (ref 1.6–2.6)
MCH RBC QN AUTO: 29.3 PG (ref 26–35)
MCHC RBC AUTO-ENTMCNC: 29.9 G/DL (ref 32–34.5)
MCV RBC AUTO: 98 FL (ref 80–99.9)
METHB: 0.7 % (ref 0–1.5)
MICROORGANISM SPEC CULT: NORMAL
MODE: ABNORMAL
MONOCYTES NFR BLD: 1.25 K/UL (ref 0.1–0.95)
MONOCYTES NFR BLD: 14 % (ref 2–12)
NEUTROPHILS NFR BLD: 75 % (ref 43–80)
NEUTS SEG NFR BLD: 6.82 K/UL (ref 1.8–7.3)
O2 SATURATION: 98.9 % (ref 92–98.5)
O2HB: 96.8 % (ref 94–97)
OPERATOR ID: 2593
PARTIAL THROMBOPLASTIN TIME: 35.4 SEC (ref 24.5–35.1)
PARTIAL THROMBOPLASTIN TIME: 45.3 SEC (ref 24.5–35.1)
PARTIAL THROMBOPLASTIN TIME: 57.6 SEC (ref 24.5–35.1)
PARTIAL THROMBOPLASTIN TIME: 57.7 SEC (ref 24.5–35.1)
PATIENT TEMP: 37 C
PCO2: 47.2 MMHG (ref 35–45)
PEEP/CPAP: 6 CMH2O
PFO2: 2.78 MMHG/%
PH BLOOD GAS: 7.43 (ref 7.35–7.45)
PHOSPHATE SERPL-MCNC: 2.5 MG/DL (ref 2.5–4.5)
PLATELET # BLD AUTO: 102 K/UL (ref 130–450)
PMV BLD AUTO: 12 FL (ref 7–12)
PO2: 138.8 MMHG (ref 75–100)
POTASSIUM SERPL-SCNC: 3.8 MMOL/L (ref 3.5–5)
POTASSIUM SERPL-SCNC: 3.8 MMOL/L (ref 3.5–5)
POTASSIUM SERPL-SCNC: 4 MMOL/L (ref 3.5–5)
PROCALCITONIN SERPL-MCNC: 0.15 NG/ML (ref 0–0.08)
PROT SERPL-MCNC: 6.5 G/DL (ref 6.4–8.3)
PROT SERPL-MCNC: 6.6 G/DL (ref 6.4–8.3)
PROTHROMBIN TIME: 20 SEC (ref 9.3–12.4)
RBC # BLD AUTO: 2.56 M/UL (ref 3.8–5.8)
RBC # BLD: ABNORMAL 10*6/UL
RI(T): 1.19
RR MECHANICAL: 16 B/MIN
SERVICE CMNT-IMP: NORMAL
SODIUM SERPL-SCNC: 142 MMOL/L (ref 132–146)
SODIUM SERPL-SCNC: 143 MMOL/L (ref 132–146)
SODIUM SERPL-SCNC: 143 MMOL/L (ref 132–146)
SOURCE, BLOOD GAS: ABNORMAL
SPECIMEN DESCRIPTION: NORMAL
THB: 8.7 G/DL (ref 11.5–16.5)
TIME ANALYZED: 554
VIT B12 SERPL-MCNC: >2000 PG/ML (ref 211–946)
VT MECHANICAL: 500 ML
WBC OTHER # BLD: 9.1 K/UL (ref 4.5–11.5)

## 2025-02-22 PROCEDURE — 6360000002 HC RX W HCPCS: Performed by: STUDENT IN AN ORGANIZED HEALTH CARE EDUCATION/TRAINING PROGRAM

## 2025-02-22 PROCEDURE — 2500000003 HC RX 250 WO HCPCS: Performed by: FAMILY MEDICINE

## 2025-02-22 PROCEDURE — 6360000002 HC RX W HCPCS

## 2025-02-22 PROCEDURE — 2580000003 HC RX 258

## 2025-02-22 PROCEDURE — 2500000003 HC RX 250 WO HCPCS: Performed by: STUDENT IN AN ORGANIZED HEALTH CARE EDUCATION/TRAINING PROGRAM

## 2025-02-22 PROCEDURE — 99291 CRITICAL CARE FIRST HOUR: CPT | Performed by: INTERNAL MEDICINE

## 2025-02-22 PROCEDURE — 80053 COMPREHEN METABOLIC PANEL: CPT

## 2025-02-22 PROCEDURE — 84145 PROCALCITONIN (PCT): CPT

## 2025-02-22 PROCEDURE — 93005 ELECTROCARDIOGRAM TRACING: CPT

## 2025-02-22 PROCEDURE — 85610 PROTHROMBIN TIME: CPT

## 2025-02-22 PROCEDURE — 85730 THROMBOPLASTIN TIME PARTIAL: CPT

## 2025-02-22 PROCEDURE — 71045 X-RAY EXAM CHEST 1 VIEW: CPT

## 2025-02-22 PROCEDURE — 99233 SBSQ HOSP IP/OBS HIGH 50: CPT | Performed by: STUDENT IN AN ORGANIZED HEALTH CARE EDUCATION/TRAINING PROGRAM

## 2025-02-22 PROCEDURE — 2000000000 HC ICU R&B

## 2025-02-22 PROCEDURE — 87040 BLOOD CULTURE FOR BACTERIA: CPT

## 2025-02-22 PROCEDURE — 94660 CPAP INITIATION&MGMT: CPT

## 2025-02-22 PROCEDURE — 82805 BLOOD GASES W/O2 SATURATION: CPT

## 2025-02-22 PROCEDURE — 85025 COMPLETE CBC W/AUTO DIFF WBC: CPT

## 2025-02-22 PROCEDURE — 6370000000 HC RX 637 (ALT 250 FOR IP)

## 2025-02-22 PROCEDURE — 82140 ASSAY OF AMMONIA: CPT

## 2025-02-22 PROCEDURE — 6370000000 HC RX 637 (ALT 250 FOR IP): Performed by: STUDENT IN AN ORGANIZED HEALTH CARE EDUCATION/TRAINING PROGRAM

## 2025-02-22 PROCEDURE — 82607 VITAMIN B-12: CPT

## 2025-02-22 PROCEDURE — 2500000003 HC RX 250 WO HCPCS

## 2025-02-22 PROCEDURE — 80048 BASIC METABOLIC PNL TOTAL CA: CPT

## 2025-02-22 PROCEDURE — 2580000003 HC RX 258: Performed by: STUDENT IN AN ORGANIZED HEALTH CARE EDUCATION/TRAINING PROGRAM

## 2025-02-22 PROCEDURE — 84100 ASSAY OF PHOSPHORUS: CPT

## 2025-02-22 PROCEDURE — 86140 C-REACTIVE PROTEIN: CPT

## 2025-02-22 PROCEDURE — 82746 ASSAY OF FOLIC ACID SERUM: CPT

## 2025-02-22 PROCEDURE — 36415 COLL VENOUS BLD VENIPUNCTURE: CPT

## 2025-02-22 PROCEDURE — 85652 RBC SED RATE AUTOMATED: CPT

## 2025-02-22 PROCEDURE — 83735 ASSAY OF MAGNESIUM: CPT

## 2025-02-22 PROCEDURE — 94640 AIRWAY INHALATION TREATMENT: CPT

## 2025-02-22 RX ORDER — FOLIC ACID 5 MG/ML
1 INJECTION, SOLUTION INTRAMUSCULAR; INTRAVENOUS; SUBCUTANEOUS DAILY
Status: DISCONTINUED | OUTPATIENT
Start: 2025-02-22 | End: 2025-02-27

## 2025-02-22 RX ORDER — POTASSIUM CHLORIDE 7.45 MG/ML
10 INJECTION INTRAVENOUS
Status: COMPLETED | OUTPATIENT
Start: 2025-02-22 | End: 2025-02-22

## 2025-02-22 RX ORDER — IPRATROPIUM BROMIDE AND ALBUTEROL SULFATE 2.5; .5 MG/3ML; MG/3ML
1 SOLUTION RESPIRATORY (INHALATION)
Status: DISCONTINUED | OUTPATIENT
Start: 2025-02-22 | End: 2025-03-08

## 2025-02-22 RX ORDER — ARFORMOTEROL TARTRATE 15 UG/2ML
15 SOLUTION RESPIRATORY (INHALATION)
Status: DISCONTINUED | OUTPATIENT
Start: 2025-02-22 | End: 2025-03-17 | Stop reason: HOSPADM

## 2025-02-22 RX ORDER — BUDESONIDE 0.5 MG/2ML
0.5 INHALANT ORAL
Status: DISCONTINUED | OUTPATIENT
Start: 2025-02-22 | End: 2025-03-17 | Stop reason: HOSPADM

## 2025-02-22 RX ADMIN — PHENOBARBITAL SODIUM 65 MG: 65 INJECTION INTRAMUSCULAR; INTRAVENOUS at 05:46

## 2025-02-22 RX ADMIN — HEPARIN SODIUM 9 UNITS/KG/HR: 10000 INJECTION, SOLUTION INTRAVENOUS at 00:38

## 2025-02-22 RX ADMIN — BUDESONIDE INHALATION 500 MCG: 0.5 SUSPENSION RESPIRATORY (INHALATION) at 09:30

## 2025-02-22 RX ADMIN — IPRATROPIUM BROMIDE AND ALBUTEROL SULFATE 1 DOSE: 2.5; .5 SOLUTION RESPIRATORY (INHALATION) at 16:43

## 2025-02-22 RX ADMIN — PHENOBARBITAL SODIUM 32.5 MG: 65 INJECTION INTRAMUSCULAR; INTRAVENOUS at 22:29

## 2025-02-22 RX ADMIN — SODIUM CHLORIDE, PRESERVATIVE FREE 40 MG: 5 INJECTION INTRAVENOUS at 20:33

## 2025-02-22 RX ADMIN — HEPARIN SODIUM 11 UNITS/KG/HR: 10000 INJECTION, SOLUTION INTRAVENOUS at 22:23

## 2025-02-22 RX ADMIN — SODIUM CHLORIDE 3000 MG: 9 INJECTION, SOLUTION INTRAVENOUS at 10:38

## 2025-02-22 RX ADMIN — PHENOBARBITAL SODIUM 65 MG: 65 INJECTION INTRAMUSCULAR; INTRAVENOUS at 10:28

## 2025-02-22 RX ADMIN — THIAMINE HYDROCHLORIDE 500 MG: 100 INJECTION, SOLUTION INTRAMUSCULAR; INTRAVENOUS at 00:25

## 2025-02-22 RX ADMIN — PHENOBARBITAL SODIUM 65 MG: 65 INJECTION INTRAMUSCULAR; INTRAVENOUS at 00:21

## 2025-02-22 RX ADMIN — RIFAXIMIN 400 MG: 200 TABLET ORAL at 20:41

## 2025-02-22 RX ADMIN — THIAMINE HYDROCHLORIDE 500 MG: 100 INJECTION, SOLUTION INTRAMUSCULAR; INTRAVENOUS at 22:32

## 2025-02-22 RX ADMIN — SODIUM CHLORIDE, PRESERVATIVE FREE 10 ML: 5 INJECTION INTRAVENOUS at 09:31

## 2025-02-22 RX ADMIN — IPRATROPIUM BROMIDE AND ALBUTEROL SULFATE 1 DOSE: 2.5; .5 SOLUTION RESPIRATORY (INHALATION) at 13:13

## 2025-02-22 RX ADMIN — FOLIC ACID 1 MG: 5 INJECTION, SOLUTION INTRAMUSCULAR; INTRAVENOUS; SUBCUTANEOUS at 09:29

## 2025-02-22 RX ADMIN — SODIUM CHLORIDE, PRESERVATIVE FREE 10 ML: 5 INJECTION INTRAVENOUS at 09:30

## 2025-02-22 RX ADMIN — ARFORMOTEROL TARTRATE 15 MCG: 15 SOLUTION RESPIRATORY (INHALATION) at 09:30

## 2025-02-22 RX ADMIN — Medication 400 MG: at 09:29

## 2025-02-22 RX ADMIN — IPRATROPIUM BROMIDE AND ALBUTEROL SULFATE 1 DOSE: 2.5; .5 SOLUTION RESPIRATORY (INHALATION) at 09:30

## 2025-02-22 RX ADMIN — HEPARIN SODIUM 2000 UNITS: 1000 INJECTION INTRAVENOUS; SUBCUTANEOUS at 07:41

## 2025-02-22 RX ADMIN — SODIUM CHLORIDE 3000 MG: 9 INJECTION, SOLUTION INTRAVENOUS at 16:08

## 2025-02-22 RX ADMIN — ARFORMOTEROL TARTRATE 15 MCG: 15 SOLUTION RESPIRATORY (INHALATION) at 21:22

## 2025-02-22 RX ADMIN — RIFAXIMIN 400 MG: 200 TABLET ORAL at 00:39

## 2025-02-22 RX ADMIN — POTASSIUM CHLORIDE 10 MEQ: 7.46 INJECTION, SOLUTION INTRAVENOUS at 20:29

## 2025-02-22 RX ADMIN — LACTULOSE 20 G: 10 SOLUTION ORAL at 09:30

## 2025-02-22 RX ADMIN — IPRATROPIUM BROMIDE AND ALBUTEROL SULFATE 1 DOSE: 2.5; .5 SOLUTION RESPIRATORY (INHALATION) at 21:21

## 2025-02-22 RX ADMIN — RIFAXIMIN 400 MG: 200 TABLET ORAL at 09:29

## 2025-02-22 RX ADMIN — BUDESONIDE INHALATION 500 MCG: 0.5 SUSPENSION RESPIRATORY (INHALATION) at 21:21

## 2025-02-22 RX ADMIN — RIFAXIMIN 400 MG: 200 TABLET ORAL at 14:07

## 2025-02-22 RX ADMIN — LORAZEPAM 2 MG: 2 INJECTION INTRAMUSCULAR; INTRAVENOUS at 21:04

## 2025-02-22 RX ADMIN — POTASSIUM CHLORIDE 10 MEQ: 7.46 INJECTION, SOLUTION INTRAVENOUS at 21:17

## 2025-02-22 RX ADMIN — THIAMINE HYDROCHLORIDE 500 MG: 100 INJECTION, SOLUTION INTRAMUSCULAR; INTRAVENOUS at 06:42

## 2025-02-22 RX ADMIN — SODIUM CHLORIDE, PRESERVATIVE FREE 10 ML: 5 INJECTION INTRAVENOUS at 20:31

## 2025-02-22 RX ADMIN — LACTULOSE 20 G: 10 SOLUTION ORAL at 14:08

## 2025-02-22 RX ADMIN — THIAMINE HYDROCHLORIDE 500 MG: 100 INJECTION, SOLUTION INTRAMUSCULAR; INTRAVENOUS at 15:57

## 2025-02-22 RX ADMIN — PHENOBARBITAL SODIUM 65 MG: 65 INJECTION INTRAMUSCULAR; INTRAVENOUS at 17:04

## 2025-02-22 RX ADMIN — LACTULOSE 20 G: 10 SOLUTION ORAL at 00:53

## 2025-02-22 RX ADMIN — LACTULOSE 20 G: 10 SOLUTION ORAL at 20:42

## 2025-02-22 RX ADMIN — SODIUM CHLORIDE, PRESERVATIVE FREE 40 MG: 5 INJECTION INTRAVENOUS at 09:30

## 2025-02-22 RX ADMIN — SODIUM CHLORIDE 3000 MG: 9 INJECTION, SOLUTION INTRAVENOUS at 22:41

## 2025-02-22 ASSESSMENT — PAIN SCALES - GENERAL
PAINLEVEL_OUTOF10: 0
PAINLEVEL_OUTOF10: 3
PAINLEVEL_OUTOF10: 0
PAINLEVEL_OUTOF10: 2
PAINLEVEL_OUTOF10: 0

## 2025-02-23 LAB
ANION GAP SERPL CALCULATED.3IONS-SCNC: 7 MMOL/L (ref 7–16)
BASOPHILS # BLD: 0.07 K/UL (ref 0–0.2)
BASOPHILS NFR BLD: 1 % (ref 0–2)
BUN SERPL-MCNC: 32 MG/DL (ref 6–20)
CALCIUM SERPL-MCNC: 8.7 MG/DL (ref 8.6–10.2)
CHLORIDE SERPL-SCNC: 106 MMOL/L (ref 98–107)
CO2 SERPL-SCNC: 35 MMOL/L (ref 22–29)
CREAT SERPL-MCNC: 2.5 MG/DL (ref 0.7–1.2)
EOSINOPHIL # BLD: 0.21 K/UL (ref 0.05–0.5)
EOSINOPHILS RELATIVE PERCENT: 3 % (ref 0–6)
ERYTHROCYTE [DISTWIDTH] IN BLOOD BY AUTOMATED COUNT: 23.7 % (ref 11.5–15)
GFR, ESTIMATED: 30 ML/MIN/1.73M2
GLUCOSE SERPL-MCNC: 133 MG/DL (ref 74–99)
HCT VFR BLD AUTO: 23.9 % (ref 37–54)
HGB BLD-MCNC: 7.1 G/DL (ref 12.5–16.5)
INR PPP: 1.8
LYMPHOCYTES NFR BLD: 0.62 K/UL (ref 1.5–4)
LYMPHOCYTES RELATIVE PERCENT: 8 % (ref 20–42)
MCH RBC QN AUTO: 29.1 PG (ref 26–35)
MCHC RBC AUTO-ENTMCNC: 29.7 G/DL (ref 32–34.5)
MCV RBC AUTO: 98 FL (ref 80–99.9)
MONOCYTES NFR BLD: 0.62 K/UL (ref 0.1–0.95)
MONOCYTES NFR BLD: 8 % (ref 2–12)
NEUTROPHILS NFR BLD: 81 % (ref 43–80)
NEUTS SEG NFR BLD: 6.29 K/UL (ref 1.8–7.3)
NUCLEATED RED BLOOD CELLS: 1 PER 100 WBC
PARTIAL THROMBOPLASTIN TIME: 38.8 SEC (ref 24.5–35.1)
PARTIAL THROMBOPLASTIN TIME: 59.4 SEC (ref 24.5–35.1)
PARTIAL THROMBOPLASTIN TIME: 68.9 SEC (ref 24.5–35.1)
PLATELET CONFIRMATION: NORMAL
PLATELET, FLUORESCENCE: 87 K/UL (ref 130–450)
PMV BLD AUTO: 12.8 FL (ref 7–12)
POTASSIUM SERPL-SCNC: 4.2 MMOL/L (ref 3.5–5)
PROTHROMBIN TIME: 19.9 SEC (ref 9.3–12.4)
RBC # BLD AUTO: 2.44 M/UL (ref 3.8–5.8)
RBC # BLD: ABNORMAL 10*6/UL
SODIUM SERPL-SCNC: 148 MMOL/L (ref 132–146)
WBC OTHER # BLD: 7.8 K/UL (ref 4.5–11.5)

## 2025-02-23 PROCEDURE — 99232 SBSQ HOSP IP/OBS MODERATE 35: CPT | Performed by: STUDENT IN AN ORGANIZED HEALTH CARE EDUCATION/TRAINING PROGRAM

## 2025-02-23 PROCEDURE — 2500000003 HC RX 250 WO HCPCS

## 2025-02-23 PROCEDURE — 85025 COMPLETE CBC W/AUTO DIFF WBC: CPT

## 2025-02-23 PROCEDURE — 2000000000 HC ICU R&B

## 2025-02-23 PROCEDURE — 93005 ELECTROCARDIOGRAM TRACING: CPT

## 2025-02-23 PROCEDURE — 6360000002 HC RX W HCPCS

## 2025-02-23 PROCEDURE — 6360000002 HC RX W HCPCS: Performed by: STUDENT IN AN ORGANIZED HEALTH CARE EDUCATION/TRAINING PROGRAM

## 2025-02-23 PROCEDURE — 99291 CRITICAL CARE FIRST HOUR: CPT | Performed by: INTERNAL MEDICINE

## 2025-02-23 PROCEDURE — 6370000000 HC RX 637 (ALT 250 FOR IP): Performed by: STUDENT IN AN ORGANIZED HEALTH CARE EDUCATION/TRAINING PROGRAM

## 2025-02-23 PROCEDURE — 2700000000 HC OXYGEN THERAPY PER DAY

## 2025-02-23 PROCEDURE — 6370000000 HC RX 637 (ALT 250 FOR IP)

## 2025-02-23 PROCEDURE — 80048 BASIC METABOLIC PNL TOTAL CA: CPT

## 2025-02-23 PROCEDURE — 85610 PROTHROMBIN TIME: CPT

## 2025-02-23 PROCEDURE — 2580000003 HC RX 258

## 2025-02-23 PROCEDURE — 85730 THROMBOPLASTIN TIME PARTIAL: CPT

## 2025-02-23 PROCEDURE — 94660 CPAP INITIATION&MGMT: CPT

## 2025-02-23 PROCEDURE — 94640 AIRWAY INHALATION TREATMENT: CPT

## 2025-02-23 PROCEDURE — 2500000003 HC RX 250 WO HCPCS: Performed by: FAMILY MEDICINE

## 2025-02-23 PROCEDURE — 2580000003 HC RX 258: Performed by: STUDENT IN AN ORGANIZED HEALTH CARE EDUCATION/TRAINING PROGRAM

## 2025-02-23 RX ORDER — METOPROLOL SUCCINATE 25 MG/1
25 TABLET, EXTENDED RELEASE ORAL DAILY
Status: DISCONTINUED | OUTPATIENT
Start: 2025-02-23 | End: 2025-03-17 | Stop reason: HOSPADM

## 2025-02-23 RX ORDER — MINERAL OIL AND WHITE PETROLATUM 150; 830 MG/G; MG/G
OINTMENT OPHTHALMIC PRN
Status: DISCONTINUED | OUTPATIENT
Start: 2025-02-23 | End: 2025-03-17 | Stop reason: HOSPADM

## 2025-02-23 RX ADMIN — LACTULOSE 20 G: 10 SOLUTION ORAL at 21:35

## 2025-02-23 RX ADMIN — THIAMINE HYDROCHLORIDE 500 MG: 100 INJECTION, SOLUTION INTRAMUSCULAR; INTRAVENOUS at 14:03

## 2025-02-23 RX ADMIN — LACTULOSE 20 G: 10 SOLUTION ORAL at 14:02

## 2025-02-23 RX ADMIN — ARFORMOTEROL TARTRATE 15 MCG: 15 SOLUTION RESPIRATORY (INHALATION) at 20:19

## 2025-02-23 RX ADMIN — PHENOBARBITAL SODIUM 32.5 MG: 65 INJECTION INTRAMUSCULAR; INTRAVENOUS at 11:20

## 2025-02-23 RX ADMIN — SODIUM CHLORIDE, PRESERVATIVE FREE 10 ML: 5 INJECTION INTRAVENOUS at 10:09

## 2025-02-23 RX ADMIN — HEPARIN SODIUM 13 UNITS/KG/HR: 10000 INJECTION, SOLUTION INTRAVENOUS at 20:22

## 2025-02-23 RX ADMIN — SODIUM CHLORIDE, PRESERVATIVE FREE 40 MG: 5 INJECTION INTRAVENOUS at 21:35

## 2025-02-23 RX ADMIN — BUDESONIDE INHALATION 500 MCG: 0.5 SUSPENSION RESPIRATORY (INHALATION) at 10:20

## 2025-02-23 RX ADMIN — SODIUM CHLORIDE 3000 MG: 9 INJECTION, SOLUTION INTRAVENOUS at 16:44

## 2025-02-23 RX ADMIN — PHENOBARBITAL SODIUM 32.5 MG: 65 INJECTION INTRAMUSCULAR; INTRAVENOUS at 16:44

## 2025-02-23 RX ADMIN — SODIUM CHLORIDE 3000 MG: 9 INJECTION, SOLUTION INTRAVENOUS at 10:26

## 2025-02-23 RX ADMIN — SODIUM CHLORIDE 3000 MG: 9 INJECTION, SOLUTION INTRAVENOUS at 21:47

## 2025-02-23 RX ADMIN — LORAZEPAM 4 MG: 2 INJECTION INTRAMUSCULAR; INTRAVENOUS at 22:59

## 2025-02-23 RX ADMIN — RIFAXIMIN 400 MG: 200 TABLET ORAL at 10:06

## 2025-02-23 RX ADMIN — LORAZEPAM 4 MG: 2 INJECTION INTRAMUSCULAR; INTRAVENOUS at 03:33

## 2025-02-23 RX ADMIN — PHENOBARBITAL SODIUM 32.5 MG: 65 INJECTION INTRAMUSCULAR; INTRAVENOUS at 05:14

## 2025-02-23 RX ADMIN — THIAMINE HYDROCHLORIDE 250 MG: 100 INJECTION, SOLUTION INTRAMUSCULAR; INTRAVENOUS at 10:07

## 2025-02-23 RX ADMIN — BUDESONIDE INHALATION 500 MCG: 0.5 SUSPENSION RESPIRATORY (INHALATION) at 20:19

## 2025-02-23 RX ADMIN — Medication 400 MG: at 10:06

## 2025-02-23 RX ADMIN — HEPARIN SODIUM 2000 UNITS: 1000 INJECTION INTRAVENOUS; SUBCUTANEOUS at 06:06

## 2025-02-23 RX ADMIN — IPRATROPIUM BROMIDE AND ALBUTEROL SULFATE 1 DOSE: 2.5; .5 SOLUTION RESPIRATORY (INHALATION) at 20:19

## 2025-02-23 RX ADMIN — LACTULOSE 20 G: 10 SOLUTION ORAL at 10:07

## 2025-02-23 RX ADMIN — FOLIC ACID 1 MG: 5 INJECTION, SOLUTION INTRAMUSCULAR; INTRAVENOUS; SUBCUTANEOUS at 10:08

## 2025-02-23 RX ADMIN — LORAZEPAM 2 MG: 2 INJECTION INTRAMUSCULAR; INTRAVENOUS at 00:10

## 2025-02-23 RX ADMIN — ARFORMOTEROL TARTRATE 15 MCG: 15 SOLUTION RESPIRATORY (INHALATION) at 10:20

## 2025-02-23 RX ADMIN — SODIUM CHLORIDE, PRESERVATIVE FREE 10 ML: 5 INJECTION INTRAVENOUS at 21:35

## 2025-02-23 RX ADMIN — IPRATROPIUM BROMIDE AND ALBUTEROL SULFATE 1 DOSE: 2.5; .5 SOLUTION RESPIRATORY (INHALATION) at 10:20

## 2025-02-23 RX ADMIN — RIFAXIMIN 400 MG: 200 TABLET ORAL at 21:35

## 2025-02-23 RX ADMIN — IPRATROPIUM BROMIDE AND ALBUTEROL SULFATE 1 DOSE: 2.5; .5 SOLUTION RESPIRATORY (INHALATION) at 16:46

## 2025-02-23 RX ADMIN — LORAZEPAM 3 MG: 2 INJECTION INTRAMUSCULAR; INTRAVENOUS at 21:37

## 2025-02-23 RX ADMIN — THIAMINE HYDROCHLORIDE 500 MG: 100 INJECTION, SOLUTION INTRAMUSCULAR; INTRAVENOUS at 06:47

## 2025-02-23 RX ADMIN — SODIUM CHLORIDE, PRESERVATIVE FREE 40 MG: 5 INJECTION INTRAVENOUS at 10:07

## 2025-02-23 RX ADMIN — PHENOBARBITAL SODIUM 32.5 MG: 65 INJECTION INTRAMUSCULAR; INTRAVENOUS at 22:45

## 2025-02-23 RX ADMIN — RIFAXIMIN 400 MG: 200 TABLET ORAL at 14:02

## 2025-02-23 RX ADMIN — SODIUM CHLORIDE 3000 MG: 9 INJECTION, SOLUTION INTRAVENOUS at 05:18

## 2025-02-23 ASSESSMENT — PAIN SCALES - GENERAL
PAINLEVEL_OUTOF10: 0
PAINLEVEL_OUTOF10: 2

## 2025-02-24 ENCOUNTER — APPOINTMENT (OUTPATIENT)
Dept: GENERAL RADIOLOGY | Age: 60
DRG: 207 | End: 2025-02-24
Payer: MEDICARE

## 2025-02-24 PROBLEM — I44.2 CHB (COMPLETE HEART BLOCK) (HCC): Status: ACTIVE | Noted: 2024-02-27

## 2025-02-24 LAB
AADO2: 95.7 MMHG
ALBUMIN SERPL-MCNC: 3.1 G/DL (ref 3.5–5.2)
ALP SERPL-CCNC: 184 U/L (ref 40–129)
ALT SERPL-CCNC: 24 U/L (ref 0–40)
ANION GAP SERPL CALCULATED.3IONS-SCNC: 11 MMOL/L (ref 7–16)
ANION GAP SERPL CALCULATED.3IONS-SCNC: 7 MMOL/L (ref 7–16)
AST SERPL-CCNC: 42 U/L (ref 0–39)
B.E.: 8 MMOL/L (ref -3–3)
BASOPHILS # BLD: 0.05 K/UL (ref 0–0.2)
BASOPHILS NFR BLD: 1 % (ref 0–2)
BILIRUB SERPL-MCNC: 2.3 MG/DL (ref 0–1.2)
BUN SERPL-MCNC: 31 MG/DL (ref 6–20)
BUN SERPL-MCNC: 31 MG/DL (ref 6–20)
CALCIUM SERPL-MCNC: 8.6 MG/DL (ref 8.6–10.2)
CALCIUM SERPL-MCNC: 8.7 MG/DL (ref 8.6–10.2)
CHLORIDE SERPL-SCNC: 108 MMOL/L (ref 98–107)
CHLORIDE SERPL-SCNC: 109 MMOL/L (ref 98–107)
CO2 SERPL-SCNC: 31 MMOL/L (ref 22–29)
CO2 SERPL-SCNC: 36 MMOL/L (ref 22–29)
COHB: 1.7 % (ref 0–1.5)
CREAT SERPL-MCNC: 2.4 MG/DL (ref 0.7–1.2)
CREAT SERPL-MCNC: 2.4 MG/DL (ref 0.7–1.2)
CRITICAL: ABNORMAL
DATE ANALYZED: ABNORMAL
DATE OF COLLECTION: ABNORMAL
EKG ATRIAL RATE: 100 BPM
EKG ATRIAL RATE: 107 BPM
EKG ATRIAL RATE: 73 BPM
EKG ATRIAL RATE: 92 BPM
EKG P AXIS: -16 DEGREES
EKG P AXIS: 142 DEGREES
EKG P-R INTERVAL: 140 MS
EKG P-R INTERVAL: 152 MS
EKG Q-T INTERVAL: 418 MS
EKG Q-T INTERVAL: 422 MS
EKG Q-T INTERVAL: 430 MS
EKG Q-T INTERVAL: 528 MS
EKG QRS DURATION: 158 MS
EKG QRS DURATION: 168 MS
EKG QTC CALCULATION (BAZETT): 540 MS
EKG QTC CALCULATION (BAZETT): 544 MS
EKG QTC CALCULATION (BAZETT): 549 MS
EKG QTC CALCULATION (BAZETT): 581 MS
EKG R AXIS: -43 DEGREES
EKG R AXIS: -48 DEGREES
EKG R AXIS: -51 DEGREES
EKG R AXIS: -51 DEGREES
EKG T AXIS: 109 DEGREES
EKG T AXIS: 111 DEGREES
EKG T AXIS: 111 DEGREES
EKG T AXIS: 125 DEGREES
EKG VENTRICULAR RATE: 100 BPM
EKG VENTRICULAR RATE: 104 BPM
EKG VENTRICULAR RATE: 73 BPM
EKG VENTRICULAR RATE: 95 BPM
EOSINOPHIL # BLD: 0.19 K/UL (ref 0.05–0.5)
EOSINOPHILS RELATIVE PERCENT: 3 % (ref 0–6)
ERYTHROCYTE [DISTWIDTH] IN BLOOD BY AUTOMATED COUNT: 23.4 % (ref 11.5–15)
FIO2: 35 %
GFR, ESTIMATED: 31 ML/MIN/1.73M2
GFR, ESTIMATED: 31 ML/MIN/1.73M2
GLUCOSE SERPL-MCNC: 107 MG/DL (ref 74–99)
GLUCOSE SERPL-MCNC: 125 MG/DL (ref 74–99)
HCO3: 33.1 MMOL/L (ref 22–26)
HCT VFR BLD AUTO: 24.1 % (ref 37–54)
HCT VFR BLD AUTO: 27.3 % (ref 37–54)
HGB BLD-MCNC: 6.8 G/DL (ref 12.5–16.5)
HGB BLD-MCNC: 7.8 G/DL (ref 12.5–16.5)
HHB: 2.7 % (ref 0–5)
IMM GRANULOCYTES # BLD AUTO: 0.1 K/UL (ref 0–0.58)
IMM GRANULOCYTES NFR BLD: 2 % (ref 0–5)
INR PPP: 1.7
LAB: ABNORMAL
LYMPHOCYTES NFR BLD: 0.81 K/UL (ref 1.5–4)
LYMPHOCYTES RELATIVE PERCENT: 12 % (ref 20–42)
Lab: 900
MAGNESIUM SERPL-MCNC: 2.1 MG/DL (ref 1.6–2.6)
MCH RBC QN AUTO: 29.1 PG (ref 26–35)
MCHC RBC AUTO-ENTMCNC: 28.2 G/DL (ref 32–34.5)
MCV RBC AUTO: 103 FL (ref 80–99.9)
METHB: 0.8 % (ref 0–1.5)
MODE: ABNORMAL
MONOCYTES NFR BLD: 1.04 K/UL (ref 0.1–0.95)
MONOCYTES NFR BLD: 16 % (ref 2–12)
NEUTROPHILS NFR BLD: 67 % (ref 43–80)
NEUTS SEG NFR BLD: 4.37 K/UL (ref 1.8–7.3)
O2 SATURATION: 97.2 % (ref 92–98.5)
O2HB: 94.8 % (ref 94–97)
OPERATOR ID: 882
PARTIAL THROMBOPLASTIN TIME: 57.6 SEC (ref 24.5–35.1)
PARTIAL THROMBOPLASTIN TIME: 62.8 SEC (ref 24.5–35.1)
PARTIAL THROMBOPLASTIN TIME: 82.8 SEC (ref 24.5–35.1)
PATIENT TEMP: 37 C
PCO2: 49.8 MMHG (ref 35–45)
PEEP/CPAP: 6 CMH2O
PFO2: 2.74 MMHG/%
PH BLOOD GAS: 7.44 (ref 7.35–7.45)
PHOSPHATE SERPL-MCNC: 2.6 MG/DL (ref 2.5–4.5)
PLATELET # BLD AUTO: 84 K/UL (ref 130–450)
PLATELET CONFIRMATION: NORMAL
PMV BLD AUTO: 11.7 FL (ref 7–12)
PO2: 96 MMHG (ref 75–100)
POTASSIUM SERPL-SCNC: 3.8 MMOL/L (ref 3.5–5)
POTASSIUM SERPL-SCNC: 4.4 MMOL/L (ref 3.5–5)
PROT SERPL-MCNC: 6.2 G/DL (ref 6.4–8.3)
PROTHROMBIN TIME: 18.9 SEC (ref 9.3–12.4)
RBC # BLD AUTO: 2.34 M/UL (ref 3.8–5.8)
RBC # BLD: ABNORMAL 10*6/UL
RI(T): 1
RR MECHANICAL: 16 B/MIN
SODIUM SERPL-SCNC: 150 MMOL/L (ref 132–146)
SODIUM SERPL-SCNC: 152 MMOL/L (ref 132–146)
SOURCE, BLOOD GAS: ABNORMAL
THB: 8.1 G/DL (ref 11.5–16.5)
TIME ANALYZED: 912
VT MECHANICAL: 500 ML
WBC OTHER # BLD: 6.6 K/UL (ref 4.5–11.5)

## 2025-02-24 PROCEDURE — 80053 COMPREHEN METABOLIC PANEL: CPT

## 2025-02-24 PROCEDURE — 6360000002 HC RX W HCPCS

## 2025-02-24 PROCEDURE — 93010 ELECTROCARDIOGRAM REPORT: CPT | Performed by: INTERNAL MEDICINE

## 2025-02-24 PROCEDURE — 82805 BLOOD GASES W/O2 SATURATION: CPT

## 2025-02-24 PROCEDURE — 2500000003 HC RX 250 WO HCPCS

## 2025-02-24 PROCEDURE — 94660 CPAP INITIATION&MGMT: CPT

## 2025-02-24 PROCEDURE — 2000000000 HC ICU R&B

## 2025-02-24 PROCEDURE — 2580000003 HC RX 258

## 2025-02-24 PROCEDURE — 85610 PROTHROMBIN TIME: CPT

## 2025-02-24 PROCEDURE — 85730 THROMBOPLASTIN TIME PARTIAL: CPT

## 2025-02-24 PROCEDURE — 36430 TRANSFUSION BLD/BLD COMPNT: CPT

## 2025-02-24 PROCEDURE — 6370000000 HC RX 637 (ALT 250 FOR IP)

## 2025-02-24 PROCEDURE — 86900 BLOOD TYPING SEROLOGIC ABO: CPT

## 2025-02-24 PROCEDURE — 2500000003 HC RX 250 WO HCPCS: Performed by: FAMILY MEDICINE

## 2025-02-24 PROCEDURE — 99233 SBSQ HOSP IP/OBS HIGH 50: CPT | Performed by: STUDENT IN AN ORGANIZED HEALTH CARE EDUCATION/TRAINING PROGRAM

## 2025-02-24 PROCEDURE — 93005 ELECTROCARDIOGRAM TRACING: CPT

## 2025-02-24 PROCEDURE — 6360000002 HC RX W HCPCS: Performed by: STUDENT IN AN ORGANIZED HEALTH CARE EDUCATION/TRAINING PROGRAM

## 2025-02-24 PROCEDURE — 71045 X-RAY EXAM CHEST 1 VIEW: CPT

## 2025-02-24 PROCEDURE — 85014 HEMATOCRIT: CPT

## 2025-02-24 PROCEDURE — 2500000003 HC RX 250 WO HCPCS: Performed by: STUDENT IN AN ORGANIZED HEALTH CARE EDUCATION/TRAINING PROGRAM

## 2025-02-24 PROCEDURE — 83735 ASSAY OF MAGNESIUM: CPT

## 2025-02-24 PROCEDURE — 84100 ASSAY OF PHOSPHORUS: CPT

## 2025-02-24 PROCEDURE — 86850 RBC ANTIBODY SCREEN: CPT

## 2025-02-24 PROCEDURE — 86923 COMPATIBILITY TEST ELECTRIC: CPT

## 2025-02-24 PROCEDURE — 99291 CRITICAL CARE FIRST HOUR: CPT | Performed by: INTERNAL MEDICINE

## 2025-02-24 PROCEDURE — P9040 RBC LEUKOREDUCED IRRADIATED: HCPCS

## 2025-02-24 PROCEDURE — 85025 COMPLETE CBC W/AUTO DIFF WBC: CPT

## 2025-02-24 PROCEDURE — 2700000000 HC OXYGEN THERAPY PER DAY

## 2025-02-24 PROCEDURE — 94640 AIRWAY INHALATION TREATMENT: CPT

## 2025-02-24 PROCEDURE — 99233 SBSQ HOSP IP/OBS HIGH 50: CPT | Performed by: INTERNAL MEDICINE

## 2025-02-24 PROCEDURE — 86901 BLOOD TYPING SEROLOGIC RH(D): CPT

## 2025-02-24 PROCEDURE — 85018 HEMOGLOBIN: CPT

## 2025-02-24 PROCEDURE — 80048 BASIC METABOLIC PNL TOTAL CA: CPT

## 2025-02-24 PROCEDURE — 2580000003 HC RX 258: Performed by: STUDENT IN AN ORGANIZED HEALTH CARE EDUCATION/TRAINING PROGRAM

## 2025-02-24 PROCEDURE — 6370000000 HC RX 637 (ALT 250 FOR IP): Performed by: STUDENT IN AN ORGANIZED HEALTH CARE EDUCATION/TRAINING PROGRAM

## 2025-02-24 RX ORDER — BUMETANIDE 0.25 MG/ML
1 INJECTION, SOLUTION INTRAMUSCULAR; INTRAVENOUS ONCE
Status: COMPLETED | OUTPATIENT
Start: 2025-02-24 | End: 2025-02-24

## 2025-02-24 RX ORDER — POTASSIUM CHLORIDE 7.45 MG/ML
10 INJECTION INTRAVENOUS
Status: ACTIVE | OUTPATIENT
Start: 2025-02-24 | End: 2025-02-24

## 2025-02-24 RX ORDER — POTASSIUM CHLORIDE 7.45 MG/ML
10 INJECTION INTRAVENOUS
Status: DISPENSED | OUTPATIENT
Start: 2025-02-24 | End: 2025-02-24

## 2025-02-24 RX ORDER — SODIUM CHLORIDE 9 MG/ML
INJECTION, SOLUTION INTRAVENOUS PRN
Status: DISCONTINUED | OUTPATIENT
Start: 2025-02-24 | End: 2025-03-04

## 2025-02-24 RX ADMIN — BUDESONIDE INHALATION 500 MCG: 0.5 SUSPENSION RESPIRATORY (INHALATION) at 08:34

## 2025-02-24 RX ADMIN — SODIUM CHLORIDE, PRESERVATIVE FREE 10 ML: 5 INJECTION INTRAVENOUS at 09:30

## 2025-02-24 RX ADMIN — IPRATROPIUM BROMIDE AND ALBUTEROL SULFATE 1 DOSE: 2.5; .5 SOLUTION RESPIRATORY (INHALATION) at 20:55

## 2025-02-24 RX ADMIN — BUDESONIDE INHALATION 500 MCG: 0.5 SUSPENSION RESPIRATORY (INHALATION) at 20:55

## 2025-02-24 RX ADMIN — RIFAXIMIN 400 MG: 200 TABLET ORAL at 09:08

## 2025-02-24 RX ADMIN — HEPARIN SODIUM 11 UNITS/KG/HR: 10000 INJECTION, SOLUTION INTRAVENOUS at 13:50

## 2025-02-24 RX ADMIN — LORAZEPAM 2 MG: 2 INJECTION INTRAMUSCULAR; INTRAVENOUS at 21:18

## 2025-02-24 RX ADMIN — BUMETANIDE 1 MG: 0.25 INJECTION INTRAMUSCULAR; INTRAVENOUS at 13:21

## 2025-02-24 RX ADMIN — IPRATROPIUM BROMIDE AND ALBUTEROL SULFATE 1 DOSE: 2.5; .5 SOLUTION RESPIRATORY (INHALATION) at 12:44

## 2025-02-24 RX ADMIN — LORAZEPAM 4 MG: 2 INJECTION INTRAMUSCULAR; INTRAVENOUS at 00:13

## 2025-02-24 RX ADMIN — SODIUM CHLORIDE 3000 MG: 9 INJECTION, SOLUTION INTRAVENOUS at 20:40

## 2025-02-24 RX ADMIN — THIAMINE HYDROCHLORIDE 250 MG: 100 INJECTION, SOLUTION INTRAMUSCULAR; INTRAVENOUS at 09:15

## 2025-02-24 RX ADMIN — RIFAXIMIN 400 MG: 200 TABLET ORAL at 13:34

## 2025-02-24 RX ADMIN — SODIUM CHLORIDE, PRESERVATIVE FREE 10 ML: 5 INJECTION INTRAVENOUS at 20:41

## 2025-02-24 RX ADMIN — SODIUM CHLORIDE 3000 MG: 9 INJECTION, SOLUTION INTRAVENOUS at 04:06

## 2025-02-24 RX ADMIN — IPRATROPIUM BROMIDE AND ALBUTEROL SULFATE 1 DOSE: 2.5; .5 SOLUTION RESPIRATORY (INHALATION) at 15:55

## 2025-02-24 RX ADMIN — DEXMEDETOMIDINE 0.2 MCG/KG/HR: 100 INJECTION, SOLUTION INTRAVENOUS at 02:00

## 2025-02-24 RX ADMIN — PHENOBARBITAL SODIUM 16.2 MG: 65 INJECTION INTRAMUSCULAR; INTRAVENOUS at 20:40

## 2025-02-24 RX ADMIN — PHENOBARBITAL SODIUM 32.5 MG: 65 INJECTION INTRAMUSCULAR; INTRAVENOUS at 09:16

## 2025-02-24 RX ADMIN — RIFAXIMIN 400 MG: 200 TABLET ORAL at 20:32

## 2025-02-24 RX ADMIN — SODIUM CHLORIDE, PRESERVATIVE FREE 40 MG: 5 INJECTION INTRAVENOUS at 20:34

## 2025-02-24 RX ADMIN — DEXMEDETOMIDINE 0.2 MCG/KG/HR: 100 INJECTION, SOLUTION INTRAVENOUS at 22:01

## 2025-02-24 RX ADMIN — ARFORMOTEROL TARTRATE 15 MCG: 15 SOLUTION RESPIRATORY (INHALATION) at 08:34

## 2025-02-24 RX ADMIN — LACTULOSE 20 G: 10 SOLUTION ORAL at 20:32

## 2025-02-24 RX ADMIN — LORAZEPAM 4 MG: 2 INJECTION INTRAMUSCULAR; INTRAVENOUS at 01:12

## 2025-02-24 RX ADMIN — LACTULOSE 20 G: 10 SOLUTION ORAL at 13:21

## 2025-02-24 RX ADMIN — POTASSIUM CHLORIDE 10 MEQ: 7.46 INJECTION, SOLUTION INTRAVENOUS at 09:26

## 2025-02-24 RX ADMIN — FOLIC ACID 1 MG: 5 INJECTION, SOLUTION INTRAMUSCULAR; INTRAVENOUS; SUBCUTANEOUS at 09:56

## 2025-02-24 RX ADMIN — POTASSIUM CHLORIDE 10 MEQ: 7.46 INJECTION, SOLUTION INTRAVENOUS at 06:49

## 2025-02-24 RX ADMIN — SODIUM CHLORIDE 3000 MG: 9 INJECTION, SOLUTION INTRAVENOUS at 13:36

## 2025-02-24 RX ADMIN — POTASSIUM PHOSPHATE, MONOBASIC AND POTASSIUM PHOSPHATE, DIBASIC 15 MMOL: 224; 236 INJECTION, SOLUTION, CONCENTRATE INTRAVENOUS at 09:51

## 2025-02-24 RX ADMIN — IPRATROPIUM BROMIDE AND ALBUTEROL SULFATE 1 DOSE: 2.5; .5 SOLUTION RESPIRATORY (INHALATION) at 08:34

## 2025-02-24 RX ADMIN — ARFORMOTEROL TARTRATE 15 MCG: 15 SOLUTION RESPIRATORY (INHALATION) at 20:55

## 2025-02-24 RX ADMIN — SODIUM CHLORIDE, PRESERVATIVE FREE 40 MG: 5 INJECTION INTRAVENOUS at 09:16

## 2025-02-24 RX ADMIN — Medication 400 MG: at 09:08

## 2025-02-24 ASSESSMENT — PAIN SCALES - GENERAL
PAINLEVEL_OUTOF10: 0

## 2025-02-25 ENCOUNTER — APPOINTMENT (OUTPATIENT)
Dept: GENERAL RADIOLOGY | Age: 60
DRG: 207 | End: 2025-02-25
Payer: MEDICARE

## 2025-02-25 PROBLEM — Z51.5 PALLIATIVE CARE ENCOUNTER: Status: ACTIVE | Noted: 2025-02-25

## 2025-02-25 PROBLEM — Z71.89 GOALS OF CARE, COUNSELING/DISCUSSION: Status: ACTIVE | Noted: 2025-02-25

## 2025-02-25 LAB
AADO2: 158.1 MMHG
AADO2: 89.5 MMHG
ABO/RH: NORMAL
ALBUMIN SERPL-MCNC: 3.2 G/DL (ref 3.5–5.2)
ALP SERPL-CCNC: 191 U/L (ref 40–129)
ALT SERPL-CCNC: 23 U/L (ref 0–40)
ANION GAP SERPL CALCULATED.3IONS-SCNC: 12 MMOL/L (ref 7–16)
ANION GAP SERPL CALCULATED.3IONS-SCNC: 13 MMOL/L (ref 7–16)
ANTIBODY SCREEN: NEGATIVE
ARM BAND NUMBER: NORMAL
AST SERPL-CCNC: 41 U/L (ref 0–39)
B.E.: 6.4 MMOL/L (ref -3–3)
B.E.: 7.9 MMOL/L (ref -3–3)
B.E.: 8.9 MMOL/L (ref -3–3)
BASOPHILS # BLD: 0.05 K/UL (ref 0–0.2)
BASOPHILS NFR BLD: 1 % (ref 0–2)
BILIRUB SERPL-MCNC: 2.2 MG/DL (ref 0–1.2)
BLOOD BANK BLOOD PRODUCT EXPIRATION DATE: NORMAL
BLOOD BANK DISPENSE STATUS: NORMAL
BLOOD BANK ISBT PRODUCT BLOOD TYPE: 9500
BLOOD BANK PRODUCT CODE: NORMAL
BLOOD BANK SAMPLE EXPIRATION: NORMAL
BLOOD BANK UNIT TYPE AND RH: NORMAL
BPU ID: NORMAL
BUN SERPL-MCNC: 32 MG/DL (ref 6–20)
BUN SERPL-MCNC: 36 MG/DL (ref 6–20)
CALCIUM SERPL-MCNC: 8.7 MG/DL (ref 8.6–10.2)
CALCIUM SERPL-MCNC: 8.9 MG/DL (ref 8.6–10.2)
CHLORIDE SERPL-SCNC: 108 MMOL/L (ref 98–107)
CHLORIDE SERPL-SCNC: 110 MMOL/L (ref 98–107)
CO2 SERPL-SCNC: 30 MMOL/L (ref 22–29)
CO2 SERPL-SCNC: 31 MMOL/L (ref 22–29)
COHB: 1.1 % (ref 0–1.5)
COHB: 1.4 % (ref 0–1.5)
COHB: 2.8 % (ref 0–1.5)
COMPONENT: NORMAL
CREAT SERPL-MCNC: 2.3 MG/DL (ref 0.7–1.2)
CREAT SERPL-MCNC: 2.3 MG/DL (ref 0.7–1.2)
CRITICAL: ABNORMAL
CROSSMATCH RESULT: NORMAL
DATE ANALYZED: ABNORMAL
DATE OF COLLECTION: ABNORMAL
EOSINOPHIL # BLD: 0.2 K/UL (ref 0.05–0.5)
EOSINOPHILS RELATIVE PERCENT: 3 % (ref 0–6)
ERYTHROCYTE [DISTWIDTH] IN BLOOD BY AUTOMATED COUNT: 24 % (ref 11.5–15)
FIO2: 35 %
FIO2: 45 %
GFR, ESTIMATED: 32 ML/MIN/1.73M2
GFR, ESTIMATED: 33 ML/MIN/1.73M2
GLUCOSE SERPL-MCNC: 130 MG/DL (ref 74–99)
GLUCOSE SERPL-MCNC: 134 MG/DL (ref 74–99)
HCO3: 32 MMOL/L (ref 22–26)
HCO3: 33 MMOL/L (ref 22–26)
HCO3: 33.5 MMOL/L (ref 22–26)
HCT VFR BLD AUTO: 25.8 % (ref 37–54)
HGB BLD-MCNC: 7.5 G/DL (ref 12.5–16.5)
HHB: 1.4 % (ref 0–5)
HHB: 1.7 % (ref 0–5)
HHB: 2.1 % (ref 0–5)
IMM GRANULOCYTES # BLD AUTO: 0.11 K/UL (ref 0–0.58)
IMM GRANULOCYTES NFR BLD: 2 % (ref 0–5)
INR PPP: 1.5
LAB: ABNORMAL
LYMPHOCYTES NFR BLD: 0.97 K/UL (ref 1.5–4)
LYMPHOCYTES RELATIVE PERCENT: 13 % (ref 20–42)
Lab: 1233
Lab: 1811
Lab: 428
MAGNESIUM SERPL-MCNC: 1.8 MG/DL (ref 1.6–2.6)
MCH RBC QN AUTO: 29.1 PG (ref 26–35)
MCHC RBC AUTO-ENTMCNC: 29.1 G/DL (ref 32–34.5)
MCV RBC AUTO: 100 FL (ref 80–99.9)
METHB: 0.6 % (ref 0–1.5)
METHB: 0.7 % (ref 0–1.5)
METHB: 0.7 % (ref 0–1.5)
MODE: ABNORMAL
MODE: ABNORMAL
MODE: AC
MONOCYTES NFR BLD: 1.07 K/UL (ref 0.1–0.95)
MONOCYTES NFR BLD: 15 % (ref 2–12)
NEUTROPHILS NFR BLD: 67 % (ref 43–80)
NEUTS SEG NFR BLD: 4.93 K/UL (ref 1.8–7.3)
O2 SATURATION: 97.8 % (ref 92–98.5)
O2 SATURATION: 98.3 % (ref 92–98.5)
O2 SATURATION: 98.6 % (ref 92–98.5)
O2HB: 94.4 % (ref 94–97)
O2HB: 96.2 % (ref 94–97)
O2HB: 96.9 % (ref 94–97)
OPERATOR ID: 2525
OPERATOR ID: 2593
OPERATOR ID: 5134
PARTIAL THROMBOPLASTIN TIME: 67.8 SEC (ref 24.5–35.1)
PATIENT TEMP: 37 C
PCO2: 37.7 MMHG (ref 35–45)
PCO2: 49.8 MMHG (ref 35–45)
PCO2: 64.5 MMHG (ref 35–45)
PEEP/CPAP: 8 CMH2O
PFO2: 2.66 MMHG/%
PFO2: 2.92 MMHG/%
PH BLOOD GAS: 7.33 (ref 7.35–7.45)
PH BLOOD GAS: 7.44 (ref 7.35–7.45)
PH BLOOD GAS: 7.55 (ref 7.35–7.45)
PHOSPHATE SERPL-MCNC: 3.4 MG/DL (ref 2.5–4.5)
PLATELET # BLD AUTO: 91 K/UL (ref 130–450)
PLATELET CONFIRMATION: NORMAL
PMV BLD AUTO: 12.7 FL (ref 7–12)
PO2: 102.2 MMHG (ref 75–100)
PO2: 119.9 MMHG (ref 75–100)
PO2: 123.6 MMHG (ref 75–100)
POTASSIUM SERPL-SCNC: 4.2 MMOL/L (ref 3.5–5)
POTASSIUM SERPL-SCNC: 4.4 MMOL/L (ref 3.5–5)
PROT SERPL-MCNC: 6.3 G/DL (ref 6.4–8.3)
PROTHROMBIN TIME: 16.6 SEC (ref 9.3–12.4)
RBC # BLD AUTO: 2.58 M/UL (ref 3.8–5.8)
RBC # BLD: ABNORMAL 10*6/UL
RI(T): 0.88
RI(T): 1.32
RR MECHANICAL: 16 B/MIN
RR MECHANICAL: 20 B/MIN
SODIUM SERPL-SCNC: 150 MMOL/L (ref 132–146)
SODIUM SERPL-SCNC: 154 MMOL/L (ref 132–146)
SOURCE, BLOOD GAS: ABNORMAL
THB: 8.6 G/DL (ref 11.5–16.5)
THB: 8.9 G/DL (ref 11.5–16.5)
THB: 9 G/DL (ref 11.5–16.5)
TIME ANALYZED: 1235
TIME ANALYZED: 1822
TIME ANALYZED: 432
TRANSFUSION STATUS: NORMAL
UNIT DIVISION: 0
UNIT ISSUE DATE/TIME: NORMAL
VT MECHANICAL: 450 ML
VT MECHANICAL: 500 ML
WBC OTHER # BLD: 7.3 K/UL (ref 4.5–11.5)

## 2025-02-25 PROCEDURE — 2580000003 HC RX 258: Performed by: STUDENT IN AN ORGANIZED HEALTH CARE EDUCATION/TRAINING PROGRAM

## 2025-02-25 PROCEDURE — 6360000002 HC RX W HCPCS: Performed by: STUDENT IN AN ORGANIZED HEALTH CARE EDUCATION/TRAINING PROGRAM

## 2025-02-25 PROCEDURE — 85025 COMPLETE CBC W/AUTO DIFF WBC: CPT

## 2025-02-25 PROCEDURE — 5A1955Z RESPIRATORY VENTILATION, GREATER THAN 96 CONSECUTIVE HOURS: ICD-10-PCS | Performed by: FAMILY MEDICINE

## 2025-02-25 PROCEDURE — 80053 COMPREHEN METABOLIC PANEL: CPT

## 2025-02-25 PROCEDURE — 6370000000 HC RX 637 (ALT 250 FOR IP): Performed by: STUDENT IN AN ORGANIZED HEALTH CARE EDUCATION/TRAINING PROGRAM

## 2025-02-25 PROCEDURE — 99291 CRITICAL CARE FIRST HOUR: CPT | Performed by: INTERNAL MEDICINE

## 2025-02-25 PROCEDURE — 6370000000 HC RX 637 (ALT 250 FOR IP)

## 2025-02-25 PROCEDURE — 83735 ASSAY OF MAGNESIUM: CPT

## 2025-02-25 PROCEDURE — 94002 VENT MGMT INPAT INIT DAY: CPT

## 2025-02-25 PROCEDURE — 71045 X-RAY EXAM CHEST 1 VIEW: CPT

## 2025-02-25 PROCEDURE — 94660 CPAP INITIATION&MGMT: CPT

## 2025-02-25 PROCEDURE — 2700000000 HC OXYGEN THERAPY PER DAY

## 2025-02-25 PROCEDURE — 84100 ASSAY OF PHOSPHORUS: CPT

## 2025-02-25 PROCEDURE — 94640 AIRWAY INHALATION TREATMENT: CPT

## 2025-02-25 PROCEDURE — 99233 SBSQ HOSP IP/OBS HIGH 50: CPT | Performed by: INTERNAL MEDICINE

## 2025-02-25 PROCEDURE — 85610 PROTHROMBIN TIME: CPT

## 2025-02-25 PROCEDURE — 2580000003 HC RX 258

## 2025-02-25 PROCEDURE — 3E033XZ INTRODUCTION OF VASOPRESSOR INTO PERIPHERAL VEIN, PERCUTANEOUS APPROACH: ICD-10-PCS | Performed by: FAMILY MEDICINE

## 2025-02-25 PROCEDURE — 6360000002 HC RX W HCPCS

## 2025-02-25 PROCEDURE — 2000000000 HC ICU R&B

## 2025-02-25 PROCEDURE — 99231 SBSQ HOSP IP/OBS SF/LOW 25: CPT

## 2025-02-25 PROCEDURE — 2500000003 HC RX 250 WO HCPCS: Performed by: STUDENT IN AN ORGANIZED HEALTH CARE EDUCATION/TRAINING PROGRAM

## 2025-02-25 PROCEDURE — 99231 SBSQ HOSP IP/OBS SF/LOW 25: CPT | Performed by: NURSE PRACTITIONER

## 2025-02-25 PROCEDURE — 99232 SBSQ HOSP IP/OBS MODERATE 35: CPT | Performed by: STUDENT IN AN ORGANIZED HEALTH CARE EDUCATION/TRAINING PROGRAM

## 2025-02-25 PROCEDURE — 0BH17EZ INSERTION OF ENDOTRACHEAL AIRWAY INTO TRACHEA, VIA NATURAL OR ARTIFICIAL OPENING: ICD-10-PCS | Performed by: FAMILY MEDICINE

## 2025-02-25 PROCEDURE — 82805 BLOOD GASES W/O2 SATURATION: CPT

## 2025-02-25 PROCEDURE — 31500 INSERT EMERGENCY AIRWAY: CPT

## 2025-02-25 PROCEDURE — 31500 INSERT EMERGENCY AIRWAY: CPT | Performed by: INTERNAL MEDICINE

## 2025-02-25 PROCEDURE — 2500000003 HC RX 250 WO HCPCS

## 2025-02-25 PROCEDURE — 85730 THROMBOPLASTIN TIME PARTIAL: CPT

## 2025-02-25 PROCEDURE — 80048 BASIC METABOLIC PNL TOTAL CA: CPT

## 2025-02-25 RX ORDER — CHLORHEXIDINE GLUCONATE ORAL RINSE 1.2 MG/ML
15 SOLUTION DENTAL 2 TIMES DAILY
Status: DISCONTINUED | OUTPATIENT
Start: 2025-02-25 | End: 2025-03-05

## 2025-02-25 RX ORDER — NOREPINEPHRINE BITARTRATE 0.06 MG/ML
1-100 INJECTION, SOLUTION INTRAVENOUS CONTINUOUS
Status: DISCONTINUED | OUTPATIENT
Start: 2025-02-25 | End: 2025-03-04

## 2025-02-25 RX ORDER — NOREPINEPHRINE BITARTRATE 0.06 MG/ML
INJECTION, SOLUTION INTRAVENOUS
Status: COMPLETED
Start: 2025-02-25 | End: 2025-02-25

## 2025-02-25 RX ORDER — FENTANYL CITRATE-0.9 % NACL/PF 10 MCG/ML
25-200 PLASTIC BAG, INJECTION (ML) INTRAVENOUS CONTINUOUS
Status: DISCONTINUED | OUTPATIENT
Start: 2025-02-25 | End: 2025-02-26

## 2025-02-25 RX ORDER — MIDAZOLAM HYDROCHLORIDE 2 MG/2ML
4 INJECTION, SOLUTION INTRAMUSCULAR; INTRAVENOUS ONCE
Status: COMPLETED | OUTPATIENT
Start: 2025-02-25 | End: 2025-02-25

## 2025-02-25 RX ORDER — SODIUM CHLORIDE FOR INHALATION 3 %
4 VIAL, NEBULIZER (ML) INHALATION EVERY 12 HOURS
Status: DISCONTINUED | OUTPATIENT
Start: 2025-02-25 | End: 2025-03-06

## 2025-02-25 RX ORDER — PROPOFOL 10 MG/ML
INJECTION, EMULSION INTRAVENOUS
Status: COMPLETED
Start: 2025-02-25 | End: 2025-02-25

## 2025-02-25 RX ORDER — PROPOFOL 10 MG/ML
5-50 INJECTION, EMULSION INTRAVENOUS CONTINUOUS
Status: DISCONTINUED | OUTPATIENT
Start: 2025-02-25 | End: 2025-02-25

## 2025-02-25 RX ORDER — MINERAL OIL AND WHITE PETROLATUM 150; 830 MG/G; MG/G
OINTMENT OPHTHALMIC EVERY 4 HOURS
Status: DISCONTINUED | OUTPATIENT
Start: 2025-02-25 | End: 2025-03-05

## 2025-02-25 RX ORDER — MIDAZOLAM HYDROCHLORIDE 1 MG/ML
INJECTION, SOLUTION INTRAMUSCULAR; INTRAVENOUS
Status: COMPLETED
Start: 2025-02-25 | End: 2025-02-25

## 2025-02-25 RX ADMIN — Medication 5 MCG/MIN: at 14:43

## 2025-02-25 RX ADMIN — RIFAXIMIN 400 MG: 200 TABLET ORAL at 15:33

## 2025-02-25 RX ADMIN — MINERAL OIL, WHITE PETROLATUM: .03; .94 OINTMENT OPHTHALMIC at 19:30

## 2025-02-25 RX ADMIN — ARFORMOTEROL TARTRATE 15 MCG: 15 SOLUTION RESPIRATORY (INHALATION) at 20:53

## 2025-02-25 RX ADMIN — LORAZEPAM 2 MG: 2 INJECTION INTRAMUSCULAR; INTRAVENOUS at 04:18

## 2025-02-25 RX ADMIN — CHLORHEXIDINE GLUCONATE, 0.12% ORAL RINSE 15 ML: 1.2 SOLUTION DENTAL at 15:33

## 2025-02-25 RX ADMIN — RIFAXIMIN 400 MG: 200 TABLET ORAL at 20:57

## 2025-02-25 RX ADMIN — SODIUM CHLORIDE, PRESERVATIVE FREE 40 MG: 5 INJECTION INTRAVENOUS at 19:36

## 2025-02-25 RX ADMIN — IPRATROPIUM BROMIDE AND ALBUTEROL SULFATE 1 DOSE: 2.5; .5 SOLUTION RESPIRATORY (INHALATION) at 16:31

## 2025-02-25 RX ADMIN — IPRATROPIUM BROMIDE AND ALBUTEROL SULFATE 1 DOSE: 2.5; .5 SOLUTION RESPIRATORY (INHALATION) at 20:53

## 2025-02-25 RX ADMIN — LACTULOSE 20 G: 10 SOLUTION ORAL at 15:33

## 2025-02-25 RX ADMIN — ARFORMOTEROL TARTRATE 15 MCG: 15 SOLUTION RESPIRATORY (INHALATION) at 09:38

## 2025-02-25 RX ADMIN — NOREPINEPHRINE BITARTRATE 5 MCG/MIN: 0.06 INJECTION, SOLUTION INTRAVENOUS at 14:43

## 2025-02-25 RX ADMIN — SODIUM CHLORIDE, PRESERVATIVE FREE 10 ML: 5 INJECTION INTRAVENOUS at 09:26

## 2025-02-25 RX ADMIN — BUDESONIDE INHALATION 500 MCG: 0.5 SUSPENSION RESPIRATORY (INHALATION) at 20:53

## 2025-02-25 RX ADMIN — Medication 4 ML: at 13:05

## 2025-02-25 RX ADMIN — PROPOFOL 20 MCG/KG/MIN: 10 INJECTION, EMULSION INTRAVENOUS at 12:43

## 2025-02-25 RX ADMIN — Medication 400 MG: at 09:18

## 2025-02-25 RX ADMIN — Medication 4 ML: at 20:53

## 2025-02-25 RX ADMIN — RIFAXIMIN 400 MG: 200 TABLET ORAL at 09:18

## 2025-02-25 RX ADMIN — CHLORHEXIDINE GLUCONATE, 0.12% ORAL RINSE 15 ML: 1.2 SOLUTION DENTAL at 19:30

## 2025-02-25 RX ADMIN — BUDESONIDE INHALATION 500 MCG: 0.5 SUSPENSION RESPIRATORY (INHALATION) at 09:38

## 2025-02-25 RX ADMIN — IPRATROPIUM BROMIDE AND ALBUTEROL SULFATE 1 DOSE: 2.5; .5 SOLUTION RESPIRATORY (INHALATION) at 13:05

## 2025-02-25 RX ADMIN — SODIUM CHLORIDE, PRESERVATIVE FREE 40 MG: 5 INJECTION INTRAVENOUS at 09:17

## 2025-02-25 RX ADMIN — IPRATROPIUM BROMIDE AND ALBUTEROL SULFATE 1 DOSE: 2.5; .5 SOLUTION RESPIRATORY (INHALATION) at 09:38

## 2025-02-25 RX ADMIN — LACTULOSE 20 G: 10 SOLUTION ORAL at 19:36

## 2025-02-25 RX ADMIN — Medication 125 MCG/HR: at 23:34

## 2025-02-25 RX ADMIN — FOLIC ACID 1 MG: 5 INJECTION, SOLUTION INTRAMUSCULAR; INTRAVENOUS; SUBCUTANEOUS at 09:17

## 2025-02-25 RX ADMIN — Medication 50 MCG/HR: at 14:49

## 2025-02-25 RX ADMIN — LACTULOSE 20 G: 10 SOLUTION ORAL at 09:26

## 2025-02-25 RX ADMIN — SODIUM CHLORIDE 3000 MG: 9 INJECTION, SOLUTION INTRAVENOUS at 09:20

## 2025-02-25 RX ADMIN — MIDAZOLAM 4 MG: 1 INJECTION INTRAMUSCULAR; INTRAVENOUS at 12:50

## 2025-02-25 RX ADMIN — THIAMINE HYDROCHLORIDE 250 MG: 100 INJECTION, SOLUTION INTRAMUSCULAR; INTRAVENOUS at 09:17

## 2025-02-25 RX ADMIN — MIDAZOLAM HYDROCHLORIDE 4 MG: 2 INJECTION, SOLUTION INTRAMUSCULAR; INTRAVENOUS at 12:50

## 2025-02-25 RX ADMIN — PHENOBARBITAL SODIUM 16.2 MG: 65 INJECTION INTRAMUSCULAR; INTRAVENOUS at 09:17

## 2025-02-25 RX ADMIN — HEPARIN SODIUM 11 UNITS/KG/HR: 10000 INJECTION, SOLUTION INTRAVENOUS at 12:28

## 2025-02-25 RX ADMIN — LORAZEPAM 2 MG: 2 INJECTION INTRAMUSCULAR; INTRAVENOUS at 01:17

## 2025-02-25 RX ADMIN — SODIUM CHLORIDE 3000 MG: 9 INJECTION, SOLUTION INTRAVENOUS at 15:32

## 2025-02-25 RX ADMIN — MINERAL OIL, WHITE PETROLATUM: .03; .94 OINTMENT OPHTHALMIC at 23:34

## 2025-02-25 RX ADMIN — SODIUM CHLORIDE 3000 MG: 9 INJECTION, SOLUTION INTRAVENOUS at 01:29

## 2025-02-25 RX ADMIN — POLYVINYL ALCOHOL, POVIDONE 1 DROP: 14; 6 SOLUTION/ DROPS OPHTHALMIC at 15:33

## 2025-02-25 RX ADMIN — SODIUM CHLORIDE 3000 MG: 9 INJECTION, SOLUTION INTRAVENOUS at 19:36

## 2025-02-25 ASSESSMENT — PULMONARY FUNCTION TESTS
PIF_VALUE: 29
PIF_VALUE: 28
PIF_VALUE: 28
PIF_VALUE: 29
PIF_VALUE: 32
PIF_VALUE: 28
PIF_VALUE: 32
PIF_VALUE: 29
PIF_VALUE: 50
PIF_VALUE: 28
PIF_VALUE: 29
PIF_VALUE: 29

## 2025-02-25 ASSESSMENT — PAIN SCALES - GENERAL
PAINLEVEL_OUTOF10: 2
PAINLEVEL_OUTOF10: 0
PAINLEVEL_OUTOF10: 4

## 2025-02-25 NOTE — PROCEDURES
PROCEDURE NOTE  Date: 2/25/2025   Name: Len Dorsey  YOB: 1965    Procedures    Intubation Procedure Note    Indication:  Respiratory Failure    Time Out:  Not able to be completed due to emergent nature of procedure.    Consent:  Consent was not able to be obtained because the procedure was emergent or the patient was unable to give consent and a surrogate decision maker was not available.     Procedure being performed:   Orotracheal intubation    Pre-oxygenation:  2 minutes of 100% FiO2 administered via bag mask .    Medications:   Diprivan    Description/Findings:  Visualization: GlideScope was utilized to intubate the patient. Cormack-Lehane: Grade 2: partial view of the glottis. A 8.0mm endotracheal tube was passed through the cords on the first attempt. The tube was secured at 24 cm at the lip. Tracheal position was confirmed using end-tidal CO2 detector, absence of breath sounds over epigastric region, and bilateral breath sounds in lungs. Respiratory therapy was at the bedside and assisted with ventilator management.      Estimated Blood Loss:  None    Complications:   No apparent complications    Follow up Chest X-Ray:   Ordered.     Proceduralist:   I personally performed the procedure documented as signed by this procedure note.     Assistant(s):  Not applicable    Supervision:  No supervision required    Electronically signed by Bay Eugene MD on 2/25/2025 at 12:47 PM

## 2025-02-26 ENCOUNTER — APPOINTMENT (OUTPATIENT)
Dept: GENERAL RADIOLOGY | Age: 60
DRG: 207 | End: 2025-02-26
Payer: MEDICARE

## 2025-02-26 LAB
AADO2: 157 MMHG
AADO2: 178.2 MMHG
ALBUMIN SERPL-MCNC: 3 G/DL (ref 3.5–5.2)
ALP SERPL-CCNC: 177 U/L (ref 40–129)
ALT SERPL-CCNC: 18 U/L (ref 0–40)
ANION GAP SERPL CALCULATED.3IONS-SCNC: 13 MMOL/L (ref 7–16)
ANION GAP SERPL CALCULATED.3IONS-SCNC: 8 MMOL/L (ref 7–16)
AST SERPL-CCNC: 30 U/L (ref 0–39)
B.E.: 4.7 MMOL/L (ref -3–3)
B.E.: 6.3 MMOL/L (ref -3–3)
BASOPHILS # BLD: 0.25 K/UL (ref 0–0.2)
BASOPHILS NFR BLD: 3 % (ref 0–2)
BILIRUB SERPL-MCNC: 1.8 MG/DL (ref 0–1.2)
BUN SERPL-MCNC: 34 MG/DL (ref 6–20)
BUN SERPL-MCNC: 36 MG/DL (ref 6–20)
CALCIUM SERPL-MCNC: 8.1 MG/DL (ref 8.6–10.2)
CALCIUM SERPL-MCNC: 8.6 MG/DL (ref 8.6–10.2)
CHLORIDE SERPL-SCNC: 108 MMOL/L (ref 98–107)
CHLORIDE SERPL-SCNC: 111 MMOL/L (ref 98–107)
CO2 SERPL-SCNC: 29 MMOL/L (ref 22–29)
CO2 SERPL-SCNC: 31 MMOL/L (ref 22–29)
COHB: 0.9 % (ref 0–1.5)
COHB: 1.7 % (ref 0–1.5)
CREAT SERPL-MCNC: 2.4 MG/DL (ref 0.7–1.2)
CREAT SERPL-MCNC: 2.6 MG/DL (ref 0.7–1.2)
CREAT UR-MCNC: 88.2 MG/DL (ref 40–278)
CRITICAL: ABNORMAL
CRITICAL: ABNORMAL
DATE ANALYZED: ABNORMAL
DATE ANALYZED: ABNORMAL
DATE OF COLLECTION: ABNORMAL
DATE OF COLLECTION: ABNORMAL
EOSINOPHIL # BLD: 0.33 K/UL (ref 0.05–0.5)
EOSINOPHILS RELATIVE PERCENT: 4 % (ref 0–6)
ERYTHROCYTE [DISTWIDTH] IN BLOOD BY AUTOMATED COUNT: 24.1 % (ref 11.5–15)
FIO2: 40 %
FIO2: 45 %
GFR, ESTIMATED: 28 ML/MIN/1.73M2
GFR, ESTIMATED: 31 ML/MIN/1.73M2
GLUCOSE SERPL-MCNC: 127 MG/DL (ref 74–99)
GLUCOSE SERPL-MCNC: 128 MG/DL (ref 74–99)
HCO3: 27.5 MMOL/L (ref 22–26)
HCO3: 29.9 MMOL/L (ref 22–26)
HCT VFR BLD AUTO: 25.4 % (ref 37–54)
HGB BLD-MCNC: 7.5 G/DL (ref 12.5–16.5)
HHB: 1.8 % (ref 0–5)
HHB: 4 % (ref 0–5)
INR PPP: 1.4
LAB: ABNORMAL
LAB: ABNORMAL
LYMPHOCYTES NFR BLD: 1.32 K/UL (ref 1.5–4)
LYMPHOCYTES RELATIVE PERCENT: 14 % (ref 20–42)
Lab: 1315
Lab: 412
MAGNESIUM SERPL-MCNC: 1.8 MG/DL (ref 1.6–2.6)
MCH RBC QN AUTO: 29.6 PG (ref 26–35)
MCHC RBC AUTO-ENTMCNC: 29.5 G/DL (ref 32–34.5)
MCV RBC AUTO: 100.4 FL (ref 80–99.9)
METHB: 0.6 % (ref 0–1.5)
METHB: 0.8 % (ref 0–1.5)
MODE: ABNORMAL
MODE: AC
MONOCYTES NFR BLD: 0.83 K/UL (ref 0.1–0.95)
MONOCYTES NFR BLD: 9 % (ref 2–12)
MYELOCYTES ABSOLUTE COUNT: 0.08 K/UL
MYELOCYTES: 1 %
NEUTROPHILS NFR BLD: 70 % (ref 43–80)
NEUTS SEG NFR BLD: 6.69 K/UL (ref 1.8–7.3)
NUCLEATED RED BLOOD CELLS: 1 PER 100 WBC
O2 SATURATION: 95.9 % (ref 92–98.5)
O2 SATURATION: 98.2 % (ref 92–98.5)
O2HB: 94.3 % (ref 94–97)
O2HB: 95.9 % (ref 94–97)
OPERATOR ID: 366
OPERATOR ID: ABNORMAL
OSMOLALITY SERPL: 324 MOSM/KG (ref 285–310)
OSMOLALITY UR: 374 MOSM/KG (ref 300–900)
PARTIAL THROMBOPLASTIN TIME: 55.1 SEC (ref 24.5–35.1)
PATIENT TEMP: 37 C
PATIENT TEMP: 37 C
PCO2: 33.6 MMHG (ref 35–45)
PCO2: 39 MMHG (ref 35–45)
PEEP/CPAP: 8 CMH2O
PEEP/CPAP: 8 CMH2O
PFO2: 2.09 MMHG/%
PFO2: 2.32 MMHG/%
PH BLOOD GAS: 7.5 (ref 7.35–7.45)
PH BLOOD GAS: 7.53 (ref 7.35–7.45)
PHOSPHATE SERPL-MCNC: 1.6 MG/DL (ref 2.5–4.5)
PLATELET # BLD AUTO: 92 K/UL (ref 130–450)
PLATELET CONFIRMATION: NORMAL
PMV BLD AUTO: 12.3 FL (ref 7–12)
PO2: 104.4 MMHG (ref 75–100)
PO2: 83.4 MMHG (ref 75–100)
POTASSIUM SERPL-SCNC: 3.9 MMOL/L (ref 3.5–5)
POTASSIUM SERPL-SCNC: 4 MMOL/L (ref 3.5–5)
PROT SERPL-MCNC: 6.2 G/DL (ref 6.4–8.3)
PROTHROMBIN TIME: 15.5 SEC (ref 9.3–12.4)
RBC # BLD AUTO: 2.53 M/UL (ref 3.8–5.8)
RBC # BLD: ABNORMAL 10*6/UL
RI(T): 1.71
RI(T): 1.88
RR MECHANICAL: 16 B/MIN
RR MECHANICAL: 20 B/MIN
SODIUM SERPL-SCNC: 147 MMOL/L (ref 132–146)
SODIUM SERPL-SCNC: 153 MMOL/L (ref 132–146)
SODIUM UR-SCNC: 59 MMOL/L
SOURCE, BLOOD GAS: ABNORMAL
SOURCE, BLOOD GAS: ABNORMAL
THB: 8.3 G/DL (ref 11.5–16.5)
THB: 8.3 G/DL (ref 11.5–16.5)
TIME ANALYZED: 1328
TIME ANALYZED: 417
UUN UR-MCNC: 451 MG/DL (ref 800–1666)
VT MECHANICAL: 450 ML
VT MECHANICAL: 450 ML
WBC OTHER # BLD: 9.5 K/UL (ref 4.5–11.5)

## 2025-02-26 PROCEDURE — 80053 COMPREHEN METABOLIC PANEL: CPT

## 2025-02-26 PROCEDURE — 80048 BASIC METABOLIC PNL TOTAL CA: CPT

## 2025-02-26 PROCEDURE — 2580000003 HC RX 258: Performed by: STUDENT IN AN ORGANIZED HEALTH CARE EDUCATION/TRAINING PROGRAM

## 2025-02-26 PROCEDURE — 99231 SBSQ HOSP IP/OBS SF/LOW 25: CPT | Performed by: NURSE PRACTITIONER

## 2025-02-26 PROCEDURE — 2580000003 HC RX 258

## 2025-02-26 PROCEDURE — 6370000000 HC RX 637 (ALT 250 FOR IP)

## 2025-02-26 PROCEDURE — 36556 INSERT NON-TUNNEL CV CATH: CPT | Performed by: INTERNAL MEDICINE

## 2025-02-26 PROCEDURE — 6370000000 HC RX 637 (ALT 250 FOR IP): Performed by: STUDENT IN AN ORGANIZED HEALTH CARE EDUCATION/TRAINING PROGRAM

## 2025-02-26 PROCEDURE — 76937 US GUIDE VASCULAR ACCESS: CPT | Performed by: INTERNAL MEDICINE

## 2025-02-26 PROCEDURE — 84300 ASSAY OF URINE SODIUM: CPT

## 2025-02-26 PROCEDURE — 94640 AIRWAY INHALATION TREATMENT: CPT

## 2025-02-26 PROCEDURE — 6360000002 HC RX W HCPCS

## 2025-02-26 PROCEDURE — 2500000003 HC RX 250 WO HCPCS: Performed by: FAMILY MEDICINE

## 2025-02-26 PROCEDURE — 85730 THROMBOPLASTIN TIME PARTIAL: CPT

## 2025-02-26 PROCEDURE — 85025 COMPLETE CBC W/AUTO DIFF WBC: CPT

## 2025-02-26 PROCEDURE — 02HV33Z INSERTION OF INFUSION DEVICE INTO SUPERIOR VENA CAVA, PERCUTANEOUS APPROACH: ICD-10-PCS | Performed by: FAMILY MEDICINE

## 2025-02-26 PROCEDURE — 99233 SBSQ HOSP IP/OBS HIGH 50: CPT | Performed by: INTERNAL MEDICINE

## 2025-02-26 PROCEDURE — 2500000003 HC RX 250 WO HCPCS

## 2025-02-26 PROCEDURE — 71045 X-RAY EXAM CHEST 1 VIEW: CPT

## 2025-02-26 PROCEDURE — 83735 ASSAY OF MAGNESIUM: CPT

## 2025-02-26 PROCEDURE — 99291 CRITICAL CARE FIRST HOUR: CPT | Performed by: INTERNAL MEDICINE

## 2025-02-26 PROCEDURE — 82570 ASSAY OF URINE CREATININE: CPT

## 2025-02-26 PROCEDURE — 2000000000 HC ICU R&B

## 2025-02-26 PROCEDURE — 82805 BLOOD GASES W/O2 SATURATION: CPT

## 2025-02-26 PROCEDURE — 6360000002 HC RX W HCPCS: Performed by: STUDENT IN AN ORGANIZED HEALTH CARE EDUCATION/TRAINING PROGRAM

## 2025-02-26 PROCEDURE — 6370000000 HC RX 637 (ALT 250 FOR IP): Performed by: INTERNAL MEDICINE

## 2025-02-26 PROCEDURE — 83930 ASSAY OF BLOOD OSMOLALITY: CPT

## 2025-02-26 PROCEDURE — 94003 VENT MGMT INPAT SUBQ DAY: CPT

## 2025-02-26 PROCEDURE — 84540 ASSAY OF URINE/UREA-N: CPT

## 2025-02-26 PROCEDURE — 85610 PROTHROMBIN TIME: CPT

## 2025-02-26 PROCEDURE — 84100 ASSAY OF PHOSPHORUS: CPT

## 2025-02-26 PROCEDURE — 83935 ASSAY OF URINE OSMOLALITY: CPT

## 2025-02-26 RX ORDER — DEXTROSE MONOHYDRATE 50 MG/ML
INJECTION, SOLUTION INTRAVENOUS CONTINUOUS
Status: ACTIVE | OUTPATIENT
Start: 2025-02-26 | End: 2025-02-26

## 2025-02-26 RX ORDER — BUMETANIDE 0.25 MG/ML
1 INJECTION, SOLUTION INTRAMUSCULAR; INTRAVENOUS ONCE
Status: COMPLETED | OUTPATIENT
Start: 2025-02-26 | End: 2025-02-26

## 2025-02-26 RX ORDER — MAGNESIUM SULFATE IN WATER 40 MG/ML
2000 INJECTION, SOLUTION INTRAVENOUS ONCE
Status: COMPLETED | OUTPATIENT
Start: 2025-02-26 | End: 2025-02-26

## 2025-02-26 RX ORDER — MIDAZOLAM HYDROCHLORIDE 1 MG/ML
1-4 INJECTION, SOLUTION INTRAVENOUS CONTINUOUS
Status: DISCONTINUED | OUTPATIENT
Start: 2025-02-26 | End: 2025-02-28

## 2025-02-26 RX ORDER — MIDAZOLAM HYDROCHLORIDE 2 MG/2ML
2 INJECTION, SOLUTION INTRAMUSCULAR; INTRAVENOUS ONCE
Status: COMPLETED | OUTPATIENT
Start: 2025-02-26 | End: 2025-02-26

## 2025-02-26 RX ADMIN — Medication 4 ML: at 21:21

## 2025-02-26 RX ADMIN — Medication 400 MG: at 09:31

## 2025-02-26 RX ADMIN — IPRATROPIUM BROMIDE AND ALBUTEROL SULFATE 1 DOSE: 2.5; .5 SOLUTION RESPIRATORY (INHALATION) at 16:23

## 2025-02-26 RX ADMIN — RIFAXIMIN 400 MG: 200 TABLET ORAL at 09:31

## 2025-02-26 RX ADMIN — POTASSIUM PHOSPHATE, MONOBASIC AND POTASSIUM PHOSPHATE, DIBASIC 20 MMOL: 224; 236 INJECTION, SOLUTION, CONCENTRATE INTRAVENOUS at 06:33

## 2025-02-26 RX ADMIN — SODIUM CHLORIDE 3000 MG: 9 INJECTION, SOLUTION INTRAVENOUS at 06:41

## 2025-02-26 RX ADMIN — MINERAL OIL, WHITE PETROLATUM: .03; .94 OINTMENT OPHTHALMIC at 04:16

## 2025-02-26 RX ADMIN — ACETAMINOPHEN 650 MG: 325 TABLET ORAL at 10:22

## 2025-02-26 RX ADMIN — MINERAL OIL, WHITE PETROLATUM: .03; .94 OINTMENT OPHTHALMIC at 21:02

## 2025-02-26 RX ADMIN — LACTULOSE 20 G: 10 SOLUTION ORAL at 09:31

## 2025-02-26 RX ADMIN — SODIUM CHLORIDE, PRESERVATIVE FREE 40 MG: 5 INJECTION INTRAVENOUS at 09:31

## 2025-02-26 RX ADMIN — BUMETANIDE 1 MG: 0.25 INJECTION INTRAMUSCULAR; INTRAVENOUS at 15:15

## 2025-02-26 RX ADMIN — Medication 150 MCG/HR: at 08:06

## 2025-02-26 RX ADMIN — METOPROLOL SUCCINATE 25 MG: 50 TABLET, EXTENDED RELEASE ORAL at 09:31

## 2025-02-26 RX ADMIN — RIFAXIMIN 400 MG: 200 TABLET ORAL at 15:15

## 2025-02-26 RX ADMIN — ACETAMINOPHEN 650 MG: 325 TABLET ORAL at 04:15

## 2025-02-26 RX ADMIN — SODIUM CHLORIDE 3000 MG: 9 INJECTION, SOLUTION INTRAVENOUS at 01:03

## 2025-02-26 RX ADMIN — BUDESONIDE INHALATION 500 MCG: 0.5 SUSPENSION RESPIRATORY (INHALATION) at 21:22

## 2025-02-26 RX ADMIN — SODIUM CHLORIDE 3000 MG: 9 INJECTION, SOLUTION INTRAVENOUS at 13:03

## 2025-02-26 RX ADMIN — MINERAL OIL, WHITE PETROLATUM: .03; .94 OINTMENT OPHTHALMIC at 08:11

## 2025-02-26 RX ADMIN — CHLORHEXIDINE GLUCONATE, 0.12% ORAL RINSE 15 ML: 1.2 SOLUTION DENTAL at 09:31

## 2025-02-26 RX ADMIN — SODIUM CHLORIDE, PRESERVATIVE FREE 10 ML: 5 INJECTION INTRAVENOUS at 21:03

## 2025-02-26 RX ADMIN — LACTULOSE 20 G: 10 SOLUTION ORAL at 21:06

## 2025-02-26 RX ADMIN — IPRATROPIUM BROMIDE AND ALBUTEROL SULFATE 1 DOSE: 2.5; .5 SOLUTION RESPIRATORY (INHALATION) at 21:22

## 2025-02-26 RX ADMIN — BUDESONIDE INHALATION 500 MCG: 0.5 SUSPENSION RESPIRATORY (INHALATION) at 08:50

## 2025-02-26 RX ADMIN — ARFORMOTEROL TARTRATE 15 MCG: 15 SOLUTION RESPIRATORY (INHALATION) at 21:22

## 2025-02-26 RX ADMIN — SODIUM CHLORIDE, PRESERVATIVE FREE 10 ML: 5 INJECTION INTRAVENOUS at 09:42

## 2025-02-26 RX ADMIN — THIAMINE HYDROCHLORIDE 250 MG: 100 INJECTION, SOLUTION INTRAMUSCULAR; INTRAVENOUS at 09:32

## 2025-02-26 RX ADMIN — FOLIC ACID 1 MG: 5 INJECTION, SOLUTION INTRAMUSCULAR; INTRAVENOUS; SUBCUTANEOUS at 09:32

## 2025-02-26 RX ADMIN — POLYVINYL ALCOHOL, POVIDONE 1 DROP: 14; 6 SOLUTION/ DROPS OPHTHALMIC at 16:30

## 2025-02-26 RX ADMIN — MAGNESIUM SULFATE HEPTAHYDRATE 2000 MG: 40 INJECTION, SOLUTION INTRAVENOUS at 06:38

## 2025-02-26 RX ADMIN — HEPARIN SODIUM 11.02 UNITS/KG/HR: 10000 INJECTION, SOLUTION INTRAVENOUS at 09:35

## 2025-02-26 RX ADMIN — DEXTROSE MONOHYDRATE: 50 INJECTION, SOLUTION INTRAVENOUS at 08:10

## 2025-02-26 RX ADMIN — IPRATROPIUM BROMIDE AND ALBUTEROL SULFATE 1 DOSE: 2.5; .5 SOLUTION RESPIRATORY (INHALATION) at 08:51

## 2025-02-26 RX ADMIN — SODIUM CHLORIDE, PRESERVATIVE FREE 40 MG: 5 INJECTION INTRAVENOUS at 21:02

## 2025-02-26 RX ADMIN — CHLORHEXIDINE GLUCONATE, 0.12% ORAL RINSE 15 ML: 1.2 SOLUTION DENTAL at 21:02

## 2025-02-26 RX ADMIN — LACTULOSE 20 G: 10 SOLUTION ORAL at 15:15

## 2025-02-26 RX ADMIN — SODIUM CHLORIDE 3000 MG: 9 INJECTION, SOLUTION INTRAVENOUS at 21:00

## 2025-02-26 RX ADMIN — Medication 4 ML: at 08:51

## 2025-02-26 RX ADMIN — IPRATROPIUM BROMIDE AND ALBUTEROL SULFATE 1 DOSE: 2.5; .5 SOLUTION RESPIRATORY (INHALATION) at 11:44

## 2025-02-26 RX ADMIN — MIDAZOLAM 2 MG: 1 INJECTION INTRAMUSCULAR; INTRAVENOUS at 21:57

## 2025-02-26 RX ADMIN — ARFORMOTEROL TARTRATE 15 MCG: 15 SOLUTION RESPIRATORY (INHALATION) at 08:50

## 2025-02-26 RX ADMIN — Medication 1 MG/HR: at 11:44

## 2025-02-26 RX ADMIN — RIFAXIMIN 400 MG: 200 TABLET ORAL at 21:06

## 2025-02-26 ASSESSMENT — PULMONARY FUNCTION TESTS
PIF_VALUE: 28
PIF_VALUE: 28
PIF_VALUE: 31
PIF_VALUE: 33
PIF_VALUE: 27
PIF_VALUE: 29
PIF_VALUE: 28
PIF_VALUE: 28
PIF_VALUE: 34
PIF_VALUE: 28
PIF_VALUE: 29
PIF_VALUE: 29
PIF_VALUE: 28
PIF_VALUE: 29
PIF_VALUE: 44
PIF_VALUE: 28
PIF_VALUE: 29
PIF_VALUE: 33
PIF_VALUE: 32
PIF_VALUE: 27
PIF_VALUE: 29
PIF_VALUE: 27
PIF_VALUE: 34
PIF_VALUE: 28
PIF_VALUE: 30

## 2025-02-26 ASSESSMENT — PAIN SCALES - GENERAL
PAINLEVEL_OUTOF10: 2
PAINLEVEL_OUTOF10: 0
PAINLEVEL_OUTOF10: 0

## 2025-02-26 NOTE — PROCEDURES
Central Line Insertion     Procedure: right internal jugular vein Triple Lumen Catheter placement.    Indications: vascular access    Consent: The legal guardian was counseled regarding the procedure, its indications, risks, potential complications and alternatives, and any questions were answered. Consent was obtained to proceed.    Number of sticks: 1    Number of Kits used: 1    Procedure: Time Out: Immediately prior to the procedure a \"timeout\" was called to verify the correct patient and procedure. The patient was place in the trendelenburg position and the skin over the right internal jugular vein was prepped with betadine and draped in a sterile fashion. Local anesthesia was obtained by infiltration using 1% Lidocaine without epinephrine.  With Ultrasound guidance a large bore needle was used to identify the vein, dark non pulsatile blood returned. The guide wire was then inserted through the needle with minimal resistance. 2 mm nick was made in the skin beside the guidewire. Then a dilator was inserted and removed. A triple lumen catheter was then inserted into the vessel over the guide wire using the Seldinger technique to the  17 cm rehana.  All ports showed good, free flowing blood return and were flushed with saline solution.  The catheter was then securely fastened to the skin with sutures and covered with a bio patch and sterile dressing.  A post procedure X-ray was ordered and showed good line position.    Complications: None   The patient tolerated the procedure well.    Estimated blood loss: 5 ml.    Yolie Gross MD PGY-1  2/26/2025  3:57 PM    Attending:  Dr. Bay Eugene

## 2025-02-27 ENCOUNTER — APPOINTMENT (OUTPATIENT)
Dept: GENERAL RADIOLOGY | Age: 60
DRG: 207 | End: 2025-02-27
Payer: MEDICARE

## 2025-02-27 LAB
AADO2: 150.2 MMHG
ALBUMIN SERPL-MCNC: 2.9 G/DL (ref 3.5–5.2)
ALP SERPL-CCNC: 164 U/L (ref 40–129)
ALT SERPL-CCNC: 17 U/L (ref 0–40)
ANION GAP SERPL CALCULATED.3IONS-SCNC: 13 MMOL/L (ref 7–16)
ANION GAP SERPL CALCULATED.3IONS-SCNC: 9 MMOL/L (ref 7–16)
AST SERPL-CCNC: 41 U/L (ref 0–39)
B.E.: 4.1 MMOL/L (ref -3–3)
BASOPHILS # BLD: 0.2 K/UL (ref 0–0.2)
BASOPHILS NFR BLD: 2 % (ref 0–2)
BILIRUB SERPL-MCNC: 2 MG/DL (ref 0–1.2)
BUN SERPL-MCNC: 34 MG/DL (ref 6–20)
BUN SERPL-MCNC: 34 MG/DL (ref 6–20)
CALCIUM SERPL-MCNC: 8 MG/DL (ref 8.6–10.2)
CALCIUM SERPL-MCNC: 8 MG/DL (ref 8.6–10.2)
CHLORIDE SERPL-SCNC: 105 MMOL/L (ref 98–107)
CHLORIDE SERPL-SCNC: 107 MMOL/L (ref 98–107)
CO2 SERPL-SCNC: 26 MMOL/L (ref 22–29)
CO2 SERPL-SCNC: 30 MMOL/L (ref 22–29)
COHB: 2.5 % (ref 0–1.5)
CREAT SERPL-MCNC: 2.5 MG/DL (ref 0.7–1.2)
CREAT SERPL-MCNC: 2.6 MG/DL (ref 0.7–1.2)
CRITICAL: ABNORMAL
DATE ANALYZED: ABNORMAL
DATE OF COLLECTION: ABNORMAL
EOSINOPHIL # BLD: 0.3 K/UL (ref 0.05–0.5)
EOSINOPHILS RELATIVE PERCENT: 3 % (ref 0–6)
ERYTHROCYTE [DISTWIDTH] IN BLOOD BY AUTOMATED COUNT: 23.6 % (ref 11.5–15)
FIO2: 40 %
GFR, ESTIMATED: 28 ML/MIN/1.73M2
GFR, ESTIMATED: 29 ML/MIN/1.73M2
GLUCOSE SERPL-MCNC: 115 MG/DL (ref 74–99)
GLUCOSE SERPL-MCNC: 119 MG/DL (ref 74–99)
HCO3: 27.4 MMOL/L (ref 22–26)
HCT VFR BLD AUTO: 25.5 % (ref 37–54)
HGB BLD-MCNC: 7.6 G/DL (ref 12.5–16.5)
HHB: 2.4 % (ref 0–5)
INR PPP: 1.3
LAB: ABNORMAL
LYMPHOCYTES NFR BLD: 0.99 K/UL (ref 1.5–4)
LYMPHOCYTES RELATIVE PERCENT: 9 % (ref 20–42)
Lab: 445
MAGNESIUM SERPL-MCNC: 2.1 MG/DL (ref 1.6–2.6)
MCH RBC QN AUTO: 29.9 PG (ref 26–35)
MCHC RBC AUTO-ENTMCNC: 29.8 G/DL (ref 32–34.5)
MCV RBC AUTO: 100.4 FL (ref 80–99.9)
METHB: 0.6 % (ref 0–1.5)
MICROORGANISM SPEC CULT: NORMAL
MICROORGANISM SPEC CULT: NORMAL
MODE: AC
MONOCYTES NFR BLD: 0.69 K/UL (ref 0.1–0.95)
MONOCYTES NFR BLD: 6 % (ref 2–12)
NEUTROPHILS NFR BLD: 81 % (ref 43–80)
NEUTS SEG NFR BLD: 9.22 K/UL (ref 1.8–7.3)
O2 SATURATION: 97.5 % (ref 92–98.5)
O2HB: 94.5 % (ref 94–97)
OPERATOR ID: 8219
PARTIAL THROMBOPLASTIN TIME: 69.5 SEC (ref 24.5–35.1)
PATIENT TEMP: 37 C
PCO2: 35.6 MMHG (ref 35–45)
PEEP/CPAP: 8 CMH2O
PFO2: 2.35 MMHG/%
PH BLOOD GAS: 7.5 (ref 7.35–7.45)
PHOSPHATE SERPL-MCNC: 3.5 MG/DL (ref 2.5–4.5)
PLATELET CONFIRMATION: NORMAL
PLATELET, FLUORESCENCE: 88 K/UL (ref 130–450)
PMV BLD AUTO: 12.4 FL (ref 7–12)
PO2: 94.1 MMHG (ref 75–100)
POTASSIUM SERPL-SCNC: 4 MMOL/L (ref 3.5–5)
POTASSIUM SERPL-SCNC: 4.4 MMOL/L (ref 3.5–5)
PROT SERPL-MCNC: 6.2 G/DL (ref 6.4–8.3)
PROTHROMBIN TIME: 14.8 SEC (ref 9.3–12.4)
RBC # BLD AUTO: 2.54 M/UL (ref 3.8–5.8)
RBC # BLD: ABNORMAL 10*6/UL
RI(T): 1.6
RR MECHANICAL: 16 B/MIN
SERVICE CMNT-IMP: NORMAL
SERVICE CMNT-IMP: NORMAL
SODIUM SERPL-SCNC: 144 MMOL/L (ref 132–146)
SODIUM SERPL-SCNC: 146 MMOL/L (ref 132–146)
SOURCE, BLOOD GAS: ABNORMAL
SPECIMEN DESCRIPTION: NORMAL
SPECIMEN DESCRIPTION: NORMAL
THB: 8.7 G/DL (ref 11.5–16.5)
TIME ANALYZED: 450
VT MECHANICAL: 450 ML
WBC OTHER # BLD: 11.4 K/UL (ref 4.5–11.5)

## 2025-02-27 PROCEDURE — 6360000002 HC RX W HCPCS

## 2025-02-27 PROCEDURE — 85025 COMPLETE CBC W/AUTO DIFF WBC: CPT

## 2025-02-27 PROCEDURE — 36415 COLL VENOUS BLD VENIPUNCTURE: CPT

## 2025-02-27 PROCEDURE — 85610 PROTHROMBIN TIME: CPT

## 2025-02-27 PROCEDURE — 2500000003 HC RX 250 WO HCPCS

## 2025-02-27 PROCEDURE — 80053 COMPREHEN METABOLIC PANEL: CPT

## 2025-02-27 PROCEDURE — 82805 BLOOD GASES W/O2 SATURATION: CPT

## 2025-02-27 PROCEDURE — 94003 VENT MGMT INPAT SUBQ DAY: CPT

## 2025-02-27 PROCEDURE — 80048 BASIC METABOLIC PNL TOTAL CA: CPT

## 2025-02-27 PROCEDURE — 6360000002 HC RX W HCPCS: Performed by: STUDENT IN AN ORGANIZED HEALTH CARE EDUCATION/TRAINING PROGRAM

## 2025-02-27 PROCEDURE — 85730 THROMBOPLASTIN TIME PARTIAL: CPT

## 2025-02-27 PROCEDURE — 99232 SBSQ HOSP IP/OBS MODERATE 35: CPT | Performed by: NURSE PRACTITIONER

## 2025-02-27 PROCEDURE — 84100 ASSAY OF PHOSPHORUS: CPT

## 2025-02-27 PROCEDURE — 6370000000 HC RX 637 (ALT 250 FOR IP)

## 2025-02-27 PROCEDURE — 2580000003 HC RX 258: Performed by: STUDENT IN AN ORGANIZED HEALTH CARE EDUCATION/TRAINING PROGRAM

## 2025-02-27 PROCEDURE — 94640 AIRWAY INHALATION TREATMENT: CPT

## 2025-02-27 PROCEDURE — 6370000000 HC RX 637 (ALT 250 FOR IP): Performed by: STUDENT IN AN ORGANIZED HEALTH CARE EDUCATION/TRAINING PROGRAM

## 2025-02-27 PROCEDURE — 99232 SBSQ HOSP IP/OBS MODERATE 35: CPT | Performed by: INTERNAL MEDICINE

## 2025-02-27 PROCEDURE — 2000000000 HC ICU R&B

## 2025-02-27 PROCEDURE — 2580000003 HC RX 258

## 2025-02-27 PROCEDURE — 99291 CRITICAL CARE FIRST HOUR: CPT | Performed by: INTERNAL MEDICINE

## 2025-02-27 PROCEDURE — 99232 SBSQ HOSP IP/OBS MODERATE 35: CPT | Performed by: STUDENT IN AN ORGANIZED HEALTH CARE EDUCATION/TRAINING PROGRAM

## 2025-02-27 PROCEDURE — 83735 ASSAY OF MAGNESIUM: CPT

## 2025-02-27 PROCEDURE — 71045 X-RAY EXAM CHEST 1 VIEW: CPT

## 2025-02-27 RX ORDER — FENTANYL CITRATE 50 UG/ML
50 INJECTION, SOLUTION INTRAMUSCULAR; INTRAVENOUS EVERY 4 HOURS PRN
Status: DISCONTINUED | OUTPATIENT
Start: 2025-02-27 | End: 2025-02-27

## 2025-02-27 RX ORDER — BUMETANIDE 0.25 MG/ML
2 INJECTION, SOLUTION INTRAMUSCULAR; INTRAVENOUS ONCE
Status: COMPLETED | OUTPATIENT
Start: 2025-02-27 | End: 2025-02-27

## 2025-02-27 RX ORDER — LORAZEPAM 2 MG/ML
2 INJECTION INTRAMUSCULAR EVERY 6 HOURS
Status: DISCONTINUED | OUTPATIENT
Start: 2025-02-27 | End: 2025-03-02

## 2025-02-27 RX ADMIN — RIFAXIMIN 400 MG: 200 TABLET ORAL at 09:08

## 2025-02-27 RX ADMIN — RIFAXIMIN 400 MG: 200 TABLET ORAL at 21:10

## 2025-02-27 RX ADMIN — LORAZEPAM 2 MG: 2 INJECTION INTRAMUSCULAR; INTRAVENOUS at 23:23

## 2025-02-27 RX ADMIN — SODIUM CHLORIDE, PRESERVATIVE FREE 40 MG: 5 INJECTION INTRAVENOUS at 19:56

## 2025-02-27 RX ADMIN — POLYVINYL ALCOHOL, POVIDONE 1 DROP: 14; 6 SOLUTION/ DROPS OPHTHALMIC at 19:55

## 2025-02-27 RX ADMIN — SODIUM CHLORIDE, PRESERVATIVE FREE 40 MG: 5 INJECTION INTRAVENOUS at 09:08

## 2025-02-27 RX ADMIN — SODIUM CHLORIDE 3000 MG: 9 INJECTION, SOLUTION INTRAVENOUS at 02:19

## 2025-02-27 RX ADMIN — FOLIC ACID 1 MG: 5 INJECTION, SOLUTION INTRAMUSCULAR; INTRAVENOUS; SUBCUTANEOUS at 09:07

## 2025-02-27 RX ADMIN — MINERAL OIL, WHITE PETROLATUM: .03; .94 OINTMENT OPHTHALMIC at 00:24

## 2025-02-27 RX ADMIN — POLYVINYL ALCOHOL, POVIDONE 1 DROP: 14; 6 SOLUTION/ DROPS OPHTHALMIC at 16:30

## 2025-02-27 RX ADMIN — POLYVINYL ALCOHOL, POVIDONE 1 DROP: 14; 6 SOLUTION/ DROPS OPHTHALMIC at 09:09

## 2025-02-27 RX ADMIN — SODIUM CHLORIDE 3000 MG: 9 INJECTION, SOLUTION INTRAVENOUS at 14:52

## 2025-02-27 RX ADMIN — BUMETANIDE 2 MG: 0.25 INJECTION INTRAMUSCULAR; INTRAVENOUS at 09:08

## 2025-02-27 RX ADMIN — BUDESONIDE INHALATION 500 MCG: 0.5 SUSPENSION RESPIRATORY (INHALATION) at 21:32

## 2025-02-27 RX ADMIN — SODIUM CHLORIDE 3000 MG: 9 INJECTION, SOLUTION INTRAVENOUS at 19:55

## 2025-02-27 RX ADMIN — HEPARIN SODIUM 11.02 UNITS/KG/HR: 10000 INJECTION, SOLUTION INTRAVENOUS at 06:43

## 2025-02-27 RX ADMIN — Medication 4 ML: at 21:34

## 2025-02-27 RX ADMIN — LACTULOSE 20 G: 10 SOLUTION ORAL at 19:55

## 2025-02-27 RX ADMIN — LACTULOSE 20 G: 10 SOLUTION ORAL at 14:52

## 2025-02-27 RX ADMIN — THIAMINE HYDROCHLORIDE 250 MG: 100 INJECTION, SOLUTION INTRAMUSCULAR; INTRAVENOUS at 09:07

## 2025-02-27 RX ADMIN — LORAZEPAM 2 MG: 2 INJECTION INTRAMUSCULAR; INTRAVENOUS at 10:48

## 2025-02-27 RX ADMIN — ARFORMOTEROL TARTRATE 15 MCG: 15 SOLUTION RESPIRATORY (INHALATION) at 21:32

## 2025-02-27 RX ADMIN — POLYVINYL ALCOHOL, POVIDONE 1 DROP: 14; 6 SOLUTION/ DROPS OPHTHALMIC at 12:30

## 2025-02-27 RX ADMIN — MINERAL OIL, WHITE PETROLATUM: .03; .94 OINTMENT OPHTHALMIC at 23:25

## 2025-02-27 RX ADMIN — LORAZEPAM 2 MG: 2 INJECTION INTRAMUSCULAR; INTRAVENOUS at 16:36

## 2025-02-27 RX ADMIN — LACTULOSE 20 G: 10 SOLUTION ORAL at 09:09

## 2025-02-27 RX ADMIN — IPRATROPIUM BROMIDE AND ALBUTEROL SULFATE 1 DOSE: 2.5; .5 SOLUTION RESPIRATORY (INHALATION) at 21:32

## 2025-02-27 RX ADMIN — FENTANYL CITRATE 50 MCG: 50 INJECTION INTRAMUSCULAR; INTRAVENOUS at 00:22

## 2025-02-27 RX ADMIN — RIFAXIMIN 400 MG: 200 TABLET ORAL at 14:52

## 2025-02-27 RX ADMIN — Medication 400 MG: at 09:09

## 2025-02-27 RX ADMIN — CHLORHEXIDINE GLUCONATE, 0.12% ORAL RINSE 15 ML: 1.2 SOLUTION DENTAL at 09:09

## 2025-02-27 RX ADMIN — IPRATROPIUM BROMIDE AND ALBUTEROL SULFATE 1 DOSE: 2.5; .5 SOLUTION RESPIRATORY (INHALATION) at 12:35

## 2025-02-27 RX ADMIN — CHLORHEXIDINE GLUCONATE, 0.12% ORAL RINSE 15 ML: 1.2 SOLUTION DENTAL at 19:56

## 2025-02-27 RX ADMIN — ARFORMOTEROL TARTRATE 15 MCG: 15 SOLUTION RESPIRATORY (INHALATION) at 08:57

## 2025-02-27 RX ADMIN — Medication 4 MG/HR: at 14:27

## 2025-02-27 RX ADMIN — Medication 4 ML: at 08:57

## 2025-02-27 RX ADMIN — IPRATROPIUM BROMIDE AND ALBUTEROL SULFATE 1 DOSE: 2.5; .5 SOLUTION RESPIRATORY (INHALATION) at 08:57

## 2025-02-27 RX ADMIN — SODIUM CHLORIDE 3000 MG: 9 INJECTION, SOLUTION INTRAVENOUS at 06:45

## 2025-02-27 RX ADMIN — BUDESONIDE INHALATION 500 MCG: 0.5 SUSPENSION RESPIRATORY (INHALATION) at 08:57

## 2025-02-27 RX ADMIN — SODIUM CHLORIDE, PRESERVATIVE FREE 10 ML: 5 INJECTION INTRAVENOUS at 09:10

## 2025-02-27 RX ADMIN — IPRATROPIUM BROMIDE AND ALBUTEROL SULFATE 1 DOSE: 2.5; .5 SOLUTION RESPIRATORY (INHALATION) at 17:14

## 2025-02-27 ASSESSMENT — PULMONARY FUNCTION TESTS
PIF_VALUE: 34
PIF_VALUE: 31
PIF_VALUE: 29
PIF_VALUE: 38
PIF_VALUE: 31
PIF_VALUE: 33
PIF_VALUE: 31
PIF_VALUE: 34
PIF_VALUE: 33
PIF_VALUE: 30
PIF_VALUE: 32
PIF_VALUE: 28
PIF_VALUE: 32
PIF_VALUE: 36
PIF_VALUE: 28
PIF_VALUE: 43
PIF_VALUE: 31
PIF_VALUE: 33
PIF_VALUE: 35
PIF_VALUE: 30
PIF_VALUE: 38
PIF_VALUE: 30
PIF_VALUE: 37
PIF_VALUE: 31
PIF_VALUE: 46
PIF_VALUE: 27
PIF_VALUE: 31
PIF_VALUE: 36
PIF_VALUE: 37
PIF_VALUE: 29
PIF_VALUE: 30
PIF_VALUE: 33

## 2025-02-27 ASSESSMENT — PAIN SCALES - GENERAL
PAINLEVEL_OUTOF10: 0
PAINLEVEL_OUTOF10: 0
PAINLEVEL_OUTOF10: 4
PAINLEVEL_OUTOF10: 5
PAINLEVEL_OUTOF10: 3

## 2025-02-27 ASSESSMENT — PAIN DESCRIPTION - LOCATION: LOCATION: FLANK;BACK;ABDOMEN

## 2025-02-28 ENCOUNTER — APPOINTMENT (OUTPATIENT)
Dept: GENERAL RADIOLOGY | Age: 60
DRG: 207 | End: 2025-02-28
Payer: MEDICARE

## 2025-02-28 LAB
AADO2: 151.5 MMHG
ALBUMIN SERPL-MCNC: 2.9 G/DL (ref 3.5–5.2)
ALP SERPL-CCNC: 171 U/L (ref 40–129)
ALT SERPL-CCNC: 13 U/L (ref 0–40)
ANION GAP SERPL CALCULATED.3IONS-SCNC: 12 MMOL/L (ref 7–16)
ANION GAP SERPL CALCULATED.3IONS-SCNC: 13 MMOL/L (ref 7–16)
AST SERPL-CCNC: 30 U/L (ref 0–39)
B.E.: 3.8 MMOL/L (ref -3–3)
BASOPHILS # BLD: 0 K/UL (ref 0–0.2)
BASOPHILS NFR BLD: 0 % (ref 0–2)
BILIRUB SERPL-MCNC: 1.8 MG/DL (ref 0–1.2)
BUN SERPL-MCNC: 32 MG/DL (ref 6–20)
BUN SERPL-MCNC: 33 MG/DL (ref 6–20)
CALCIUM SERPL-MCNC: 8.2 MG/DL (ref 8.6–10.2)
CALCIUM SERPL-MCNC: 8.2 MG/DL (ref 8.6–10.2)
CHLORIDE SERPL-SCNC: 103 MMOL/L (ref 98–107)
CHLORIDE SERPL-SCNC: 106 MMOL/L (ref 98–107)
CO2 SERPL-SCNC: 27 MMOL/L (ref 22–29)
CO2 SERPL-SCNC: 28 MMOL/L (ref 22–29)
COHB: 1.7 % (ref 0–1.5)
CREAT SERPL-MCNC: 2.6 MG/DL (ref 0.7–1.2)
CREAT SERPL-MCNC: 2.6 MG/DL (ref 0.7–1.2)
CRITICAL: ABNORMAL
DATE ANALYZED: ABNORMAL
DATE OF COLLECTION: ABNORMAL
EKG ATRIAL RATE: 46 BPM
EKG Q-T INTERVAL: 478 MS
EKG QRS DURATION: 162 MS
EKG QTC CALCULATION (BAZETT): 616 MS
EKG R AXIS: -31 DEGREES
EKG T AXIS: 115 DEGREES
EKG VENTRICULAR RATE: 100 BPM
EOSINOPHIL # BLD: 0.17 K/UL (ref 0.05–0.5)
EOSINOPHILS RELATIVE PERCENT: 2 % (ref 0–6)
ERYTHROCYTE [DISTWIDTH] IN BLOOD BY AUTOMATED COUNT: 23.4 % (ref 11.5–15)
FIO2: 40 %
GFR, ESTIMATED: 28 ML/MIN/1.73M2
GFR, ESTIMATED: 28 ML/MIN/1.73M2
GLUCOSE SERPL-MCNC: 103 MG/DL (ref 74–99)
GLUCOSE SERPL-MCNC: 95 MG/DL (ref 74–99)
HCO3: 27 MMOL/L (ref 22–26)
HCT VFR BLD AUTO: 23.8 % (ref 37–54)
HGB BLD-MCNC: 7 G/DL (ref 12.5–16.5)
HHB: 2.6 % (ref 0–5)
INR PPP: 1.4
LAB: ABNORMAL
LYMPHOCYTES NFR BLD: 0.5 K/UL (ref 1.5–4)
LYMPHOCYTES RELATIVE PERCENT: 5 % (ref 20–42)
Lab: 514
MAGNESIUM SERPL-MCNC: 2.1 MG/DL (ref 1.6–2.6)
MCH RBC QN AUTO: 30 PG (ref 26–35)
MCHC RBC AUTO-ENTMCNC: 29.4 G/DL (ref 32–34.5)
MCV RBC AUTO: 102.1 FL (ref 80–99.9)
METHB: 0.7 % (ref 0–1.5)
MODE: AC
MONOCYTES NFR BLD: 0.74 K/UL (ref 0.1–0.95)
MONOCYTES NFR BLD: 8 % (ref 2–12)
NEUTROPHILS NFR BLD: 85 % (ref 43–80)
NEUTS SEG NFR BLD: 8.1 K/UL (ref 1.8–7.3)
O2 SATURATION: 97.3 % (ref 92–98.5)
O2HB: 95 % (ref 94–97)
OPERATOR ID: 2593
PARTIAL THROMBOPLASTIN TIME: 59.2 SEC (ref 24.5–35.1)
PATIENT TEMP: 37 C
PCO2: 34.7 MMHG (ref 35–45)
PEEP/CPAP: 8 CMH2O
PFO2: 2.35 MMHG/%
PH BLOOD GAS: 7.51 (ref 7.35–7.45)
PHOSPHATE SERPL-MCNC: 3.5 MG/DL (ref 2.5–4.5)
PLATELET CONFIRMATION: NORMAL
PLATELET, FLUORESCENCE: 76 K/UL (ref 130–450)
PMV BLD AUTO: 13.9 FL (ref 7–12)
PO2: 93.8 MMHG (ref 75–100)
POTASSIUM SERPL-SCNC: 3.9 MMOL/L (ref 3.5–5)
POTASSIUM SERPL-SCNC: 4.1 MMOL/L (ref 3.5–5)
PROT SERPL-MCNC: 6.2 G/DL (ref 6.4–8.3)
PROTHROMBIN TIME: 14.8 SEC (ref 9.3–12.4)
RBC # BLD AUTO: 2.33 M/UL (ref 3.8–5.8)
RBC # BLD: ABNORMAL 10*6/UL
RI(T): 1.62
RR MECHANICAL: 14 B/MIN
SODIUM SERPL-SCNC: 143 MMOL/L (ref 132–146)
SODIUM SERPL-SCNC: 146 MMOL/L (ref 132–146)
SOURCE, BLOOD GAS: ABNORMAL
THB: 7.9 G/DL (ref 11.5–16.5)
TIME ANALYZED: 519
VT MECHANICAL: 450 ML
WBC OTHER # BLD: 9.5 K/UL (ref 4.5–11.5)

## 2025-02-28 PROCEDURE — 2580000003 HC RX 258

## 2025-02-28 PROCEDURE — 85025 COMPLETE CBC W/AUTO DIFF WBC: CPT

## 2025-02-28 PROCEDURE — 71045 X-RAY EXAM CHEST 1 VIEW: CPT

## 2025-02-28 PROCEDURE — 2500000003 HC RX 250 WO HCPCS

## 2025-02-28 PROCEDURE — 6360000002 HC RX W HCPCS

## 2025-02-28 PROCEDURE — 84100 ASSAY OF PHOSPHORUS: CPT

## 2025-02-28 PROCEDURE — 83735 ASSAY OF MAGNESIUM: CPT

## 2025-02-28 PROCEDURE — 6370000000 HC RX 637 (ALT 250 FOR IP)

## 2025-02-28 PROCEDURE — 2580000003 HC RX 258: Performed by: STUDENT IN AN ORGANIZED HEALTH CARE EDUCATION/TRAINING PROGRAM

## 2025-02-28 PROCEDURE — 2709999900 HC NON-CHARGEABLE SUPPLY: Performed by: STUDENT IN AN ORGANIZED HEALTH CARE EDUCATION/TRAINING PROGRAM

## 2025-02-28 PROCEDURE — 85730 THROMBOPLASTIN TIME PARTIAL: CPT

## 2025-02-28 PROCEDURE — 0DB68ZX EXCISION OF STOMACH, VIA NATURAL OR ARTIFICIAL OPENING ENDOSCOPIC, DIAGNOSTIC: ICD-10-PCS | Performed by: FAMILY MEDICINE

## 2025-02-28 PROCEDURE — 94003 VENT MGMT INPAT SUBQ DAY: CPT

## 2025-02-28 PROCEDURE — 3E0G76Z INTRODUCTION OF NUTRITIONAL SUBSTANCE INTO UPPER GI, VIA NATURAL OR ARTIFICIAL OPENING: ICD-10-PCS | Performed by: FAMILY MEDICINE

## 2025-02-28 PROCEDURE — 85610 PROTHROMBIN TIME: CPT

## 2025-02-28 PROCEDURE — 6370000000 HC RX 637 (ALT 250 FOR IP): Performed by: STUDENT IN AN ORGANIZED HEALTH CARE EDUCATION/TRAINING PROGRAM

## 2025-02-28 PROCEDURE — 2500000003 HC RX 250 WO HCPCS: Performed by: FAMILY MEDICINE

## 2025-02-28 PROCEDURE — 88305 TISSUE EXAM BY PATHOLOGIST: CPT

## 2025-02-28 PROCEDURE — 6360000002 HC RX W HCPCS: Performed by: STUDENT IN AN ORGANIZED HEALTH CARE EDUCATION/TRAINING PROGRAM

## 2025-02-28 PROCEDURE — 82805 BLOOD GASES W/O2 SATURATION: CPT

## 2025-02-28 PROCEDURE — 0DH67UZ INSERTION OF FEEDING DEVICE INTO STOMACH, VIA NATURAL OR ARTIFICIAL OPENING: ICD-10-PCS | Performed by: FAMILY MEDICINE

## 2025-02-28 PROCEDURE — 80053 COMPREHEN METABOLIC PANEL: CPT

## 2025-02-28 PROCEDURE — 94640 AIRWAY INHALATION TREATMENT: CPT

## 2025-02-28 PROCEDURE — 2000000000 HC ICU R&B

## 2025-02-28 PROCEDURE — 80048 BASIC METABOLIC PNL TOTAL CA: CPT

## 2025-02-28 PROCEDURE — 3609012400 HC EGD TRANSORAL BIOPSY SINGLE/MULTIPLE: Performed by: STUDENT IN AN ORGANIZED HEALTH CARE EDUCATION/TRAINING PROGRAM

## 2025-02-28 PROCEDURE — 99232 SBSQ HOSP IP/OBS MODERATE 35: CPT | Performed by: INTERNAL MEDICINE

## 2025-02-28 PROCEDURE — 93010 ELECTROCARDIOGRAM REPORT: CPT | Performed by: INTERNAL MEDICINE

## 2025-02-28 RX ORDER — MIDAZOLAM HYDROCHLORIDE 2 MG/2ML
2 INJECTION, SOLUTION INTRAMUSCULAR; INTRAVENOUS ONCE
Status: COMPLETED | OUTPATIENT
Start: 2025-02-28 | End: 2025-02-28

## 2025-02-28 RX ORDER — BUMETANIDE 0.25 MG/ML
2 INJECTION, SOLUTION INTRAMUSCULAR; INTRAVENOUS ONCE
Status: DISCONTINUED | OUTPATIENT
Start: 2025-02-28 | End: 2025-02-28

## 2025-02-28 RX ORDER — BUMETANIDE 0.25 MG/ML
2 INJECTION, SOLUTION INTRAMUSCULAR; INTRAVENOUS 2 TIMES DAILY
Status: DISCONTINUED | OUTPATIENT
Start: 2025-02-28 | End: 2025-03-02

## 2025-02-28 RX ORDER — WARFARIN SODIUM 1 MG/1
1 TABLET ORAL DAILY
Status: DISCONTINUED | OUTPATIENT
Start: 2025-02-28 | End: 2025-03-04

## 2025-02-28 RX ORDER — POTASSIUM CHLORIDE 7.45 MG/ML
10 INJECTION INTRAVENOUS ONCE
Status: COMPLETED | OUTPATIENT
Start: 2025-02-28 | End: 2025-02-28

## 2025-02-28 RX ADMIN — SODIUM CHLORIDE 3000 MG: 9 INJECTION, SOLUTION INTRAVENOUS at 18:08

## 2025-02-28 RX ADMIN — WARFARIN SODIUM 1 MG: 1 TABLET ORAL at 17:53

## 2025-02-28 RX ADMIN — SODIUM CHLORIDE, PRESERVATIVE FREE 10 ML: 5 INJECTION INTRAVENOUS at 21:15

## 2025-02-28 RX ADMIN — CHLORHEXIDINE GLUCONATE, 0.12% ORAL RINSE 15 ML: 1.2 SOLUTION DENTAL at 08:33

## 2025-02-28 RX ADMIN — Medication 400 MG: at 08:20

## 2025-02-28 RX ADMIN — MIDAZOLAM HYDROCHLORIDE 2 MG: 1 INJECTION, SOLUTION INTRAMUSCULAR; INTRAVENOUS at 10:56

## 2025-02-28 RX ADMIN — SODIUM CHLORIDE, PRESERVATIVE FREE 40 MG: 5 INJECTION INTRAVENOUS at 21:02

## 2025-02-28 RX ADMIN — SODIUM CHLORIDE 3000 MG: 9 INJECTION, SOLUTION INTRAVENOUS at 13:13

## 2025-02-28 RX ADMIN — IPRATROPIUM BROMIDE AND ALBUTEROL SULFATE 1 DOSE: 2.5; .5 SOLUTION RESPIRATORY (INHALATION) at 16:00

## 2025-02-28 RX ADMIN — BUDESONIDE INHALATION 500 MCG: 0.5 SUSPENSION RESPIRATORY (INHALATION) at 20:13

## 2025-02-28 RX ADMIN — LORAZEPAM 2 MG: 2 INJECTION INTRAMUSCULAR; INTRAVENOUS at 16:46

## 2025-02-28 RX ADMIN — IPRATROPIUM BROMIDE AND ALBUTEROL SULFATE 1 DOSE: 2.5; .5 SOLUTION RESPIRATORY (INHALATION) at 20:13

## 2025-02-28 RX ADMIN — CHLORHEXIDINE GLUCONATE, 0.12% ORAL RINSE 15 ML: 1.2 SOLUTION DENTAL at 21:00

## 2025-02-28 RX ADMIN — BUMETANIDE 2 MG: 0.25 INJECTION INTRAMUSCULAR; INTRAVENOUS at 17:51

## 2025-02-28 RX ADMIN — SODIUM CHLORIDE, PRESERVATIVE FREE 10 ML: 5 INJECTION INTRAVENOUS at 21:05

## 2025-02-28 RX ADMIN — LACTULOSE 20 G: 10 SOLUTION ORAL at 08:20

## 2025-02-28 RX ADMIN — LORAZEPAM 2 MG: 2 INJECTION INTRAMUSCULAR; INTRAVENOUS at 04:36

## 2025-02-28 RX ADMIN — ARFORMOTEROL TARTRATE 15 MCG: 15 SOLUTION RESPIRATORY (INHALATION) at 09:10

## 2025-02-28 RX ADMIN — SODIUM CHLORIDE 3000 MG: 9 INJECTION, SOLUTION INTRAVENOUS at 01:54

## 2025-02-28 RX ADMIN — HEPARIN SODIUM 11.02 UNITS/KG/HR: 10000 INJECTION, SOLUTION INTRAVENOUS at 03:55

## 2025-02-28 RX ADMIN — IPRATROPIUM BROMIDE AND ALBUTEROL SULFATE 1 DOSE: 2.5; .5 SOLUTION RESPIRATORY (INHALATION) at 09:10

## 2025-02-28 RX ADMIN — ARFORMOTEROL TARTRATE 15 MCG: 15 SOLUTION RESPIRATORY (INHALATION) at 20:13

## 2025-02-28 RX ADMIN — BUDESONIDE INHALATION 500 MCG: 0.5 SUSPENSION RESPIRATORY (INHALATION) at 09:10

## 2025-02-28 RX ADMIN — SODIUM CHLORIDE, PRESERVATIVE FREE 40 MG: 5 INJECTION INTRAVENOUS at 08:20

## 2025-02-28 RX ADMIN — MINERAL OIL, WHITE PETROLATUM: .03; .94 OINTMENT OPHTHALMIC at 08:33

## 2025-02-28 RX ADMIN — SODIUM CHLORIDE 3000 MG: 9 INJECTION, SOLUTION INTRAVENOUS at 08:22

## 2025-02-28 RX ADMIN — RIFAXIMIN 400 MG: 200 TABLET ORAL at 21:02

## 2025-02-28 RX ADMIN — RIFAXIMIN 400 MG: 200 TABLET ORAL at 08:20

## 2025-02-28 RX ADMIN — POTASSIUM CHLORIDE 10 MEQ: 7.46 INJECTION, SOLUTION INTRAVENOUS at 08:27

## 2025-02-28 RX ADMIN — MINERAL OIL, WHITE PETROLATUM: .03; .94 OINTMENT OPHTHALMIC at 20:44

## 2025-02-28 RX ADMIN — LACTULOSE 20 G: 10 SOLUTION ORAL at 21:02

## 2025-02-28 RX ADMIN — Medication 4 ML: at 09:10

## 2025-02-28 RX ADMIN — LORAZEPAM 2 MG: 2 INJECTION INTRAMUSCULAR; INTRAVENOUS at 22:17

## 2025-02-28 RX ADMIN — POLYVINYL ALCOHOL, POVIDONE 1 DROP: 14; 6 SOLUTION/ DROPS OPHTHALMIC at 04:41

## 2025-02-28 RX ADMIN — Medication 4 ML: at 20:14

## 2025-02-28 RX ADMIN — BUMETANIDE 2 MG: 0.25 INJECTION INTRAMUSCULAR; INTRAVENOUS at 08:30

## 2025-02-28 RX ADMIN — LORAZEPAM 2 MG: 2 INJECTION INTRAMUSCULAR; INTRAVENOUS at 10:22

## 2025-02-28 RX ADMIN — IPRATROPIUM BROMIDE AND ALBUTEROL SULFATE 1 DOSE: 2.5; .5 SOLUTION RESPIRATORY (INHALATION) at 13:19

## 2025-02-28 RX ADMIN — SODIUM CHLORIDE, PRESERVATIVE FREE 10 ML: 5 INJECTION INTRAVENOUS at 08:35

## 2025-02-28 RX ADMIN — SODIUM CHLORIDE, PRESERVATIVE FREE 10 ML: 5 INJECTION INTRAVENOUS at 21:13

## 2025-02-28 ASSESSMENT — PULMONARY FUNCTION TESTS
PIF_VALUE: 32
PIF_VALUE: 30
PIF_VALUE: 29
PIF_VALUE: 30
PIF_VALUE: 29
PIF_VALUE: 19
PIF_VALUE: 28
PIF_VALUE: 30
PIF_VALUE: 31
PIF_VALUE: 30
PIF_VALUE: 31
PIF_VALUE: 29
PIF_VALUE: 32
PIF_VALUE: 28
PIF_VALUE: 37
PIF_VALUE: 28
PIF_VALUE: 29
PIF_VALUE: 29
PIF_VALUE: 33
PIF_VALUE: 31
PIF_VALUE: 36
PIF_VALUE: 36

## 2025-02-28 ASSESSMENT — PAIN SCALES - GENERAL
PAINLEVEL_OUTOF10: 0

## 2025-03-01 ENCOUNTER — APPOINTMENT (OUTPATIENT)
Dept: GENERAL RADIOLOGY | Age: 60
DRG: 207 | End: 2025-03-01
Payer: MEDICARE

## 2025-03-01 LAB
AADO2: 128 MMHG
ALBUMIN SERPL-MCNC: 3 G/DL (ref 3.5–5.2)
ALP SERPL-CCNC: 174 U/L (ref 40–129)
ALT SERPL-CCNC: 14 U/L (ref 0–40)
ANION GAP SERPL CALCULATED.3IONS-SCNC: 15 MMOL/L (ref 7–16)
AST SERPL-CCNC: 36 U/L (ref 0–39)
B.E.: 2 MMOL/L (ref -3–3)
BASOPHILS # BLD: 0.15 K/UL (ref 0–0.2)
BASOPHILS NFR BLD: 2 % (ref 0–2)
BILIRUB SERPL-MCNC: 2 MG/DL (ref 0–1.2)
BUN SERPL-MCNC: 31 MG/DL (ref 6–20)
CALCIUM SERPL-MCNC: 8.6 MG/DL (ref 8.6–10.2)
CHLORIDE SERPL-SCNC: 107 MMOL/L (ref 98–107)
CO2 SERPL-SCNC: 26 MMOL/L (ref 22–29)
COHB: 2.2 % (ref 0–1.5)
CREAT SERPL-MCNC: 2.7 MG/DL (ref 0.7–1.2)
CRITICAL: ABNORMAL
DATE ANALYZED: ABNORMAL
DATE OF COLLECTION: ABNORMAL
EOSINOPHIL # BLD: 0.54 K/UL (ref 0.05–0.5)
EOSINOPHILS RELATIVE PERCENT: 6 % (ref 0–6)
ERYTHROCYTE [DISTWIDTH] IN BLOOD BY AUTOMATED COUNT: 23.1 % (ref 11.5–15)
FIO2: 40 %
GFR, ESTIMATED: 27 ML/MIN/1.73M2
GLUCOSE SERPL-MCNC: 91 MG/DL (ref 74–99)
HCO3: 24.9 MMOL/L (ref 22–26)
HCT VFR BLD AUTO: 23.1 % (ref 37–54)
HCT VFR BLD AUTO: 26.3 % (ref 37–54)
HGB BLD-MCNC: 6.8 G/DL (ref 12.5–16.5)
HGB BLD-MCNC: 7.7 G/DL (ref 12.5–16.5)
HHB: 1 % (ref 0–5)
INR PPP: 1.3
LAB: ABNORMAL
LYMPHOCYTES NFR BLD: 0.54 K/UL (ref 1.5–4)
LYMPHOCYTES RELATIVE PERCENT: 6 % (ref 20–42)
Lab: 432
MAGNESIUM SERPL-MCNC: 2 MG/DL (ref 1.6–2.6)
MCH RBC QN AUTO: 30.1 PG (ref 26–35)
MCHC RBC AUTO-ENTMCNC: 29.4 G/DL (ref 32–34.5)
MCV RBC AUTO: 102.2 FL (ref 80–99.9)
METHB: 0.6 % (ref 0–1.5)
MODE: AC
MONOCYTES NFR BLD: 0.69 K/UL (ref 0.1–0.95)
MONOCYTES NFR BLD: 8 % (ref 2–12)
NEUTROPHILS NFR BLD: 78 % (ref 43–80)
NEUTS SEG NFR BLD: 6.89 K/UL (ref 1.8–7.3)
NUCLEATED RED BLOOD CELLS: 1 PER 100 WBC
O2 SATURATION: 99 % (ref 92–98.5)
O2HB: 96.2 % (ref 94–97)
OPERATOR ID: 3342
PARTIAL THROMBOPLASTIN TIME: 32.5 SEC (ref 24.5–35.1)
PATIENT TEMP: 37 C
PCO2: 31.6 MMHG (ref 35–45)
PEEP/CPAP: 8 CMH2O
PFO2: 3.02 MMHG/%
PH BLOOD GAS: 7.51 (ref 7.35–7.45)
PHOSPHATE SERPL-MCNC: 3.8 MG/DL (ref 2.5–4.5)
PLATELET CONFIRMATION: NORMAL
PLATELET, FLUORESCENCE: 83 K/UL (ref 130–450)
PMV BLD AUTO: 13.2 FL (ref 7–12)
PO2: 120.9 MMHG (ref 75–100)
POTASSIUM SERPL-SCNC: 3.9 MMOL/L (ref 3.5–5)
PROT SERPL-MCNC: 6.3 G/DL (ref 6.4–8.3)
PROTHROMBIN TIME: 14.2 SEC (ref 9.3–12.4)
RBC # BLD AUTO: 2.26 M/UL (ref 3.8–5.8)
RBC # BLD: ABNORMAL 10*6/UL
RI(T): 1.06
RR MECHANICAL: 12 B/MIN
SODIUM SERPL-SCNC: 148 MMOL/L (ref 132–146)
SOURCE, BLOOD GAS: ABNORMAL
THB: 7.8 G/DL (ref 11.5–16.5)
TIME ANALYZED: 438
VT MECHANICAL: 450 ML
WBC OTHER # BLD: 8.8 K/UL (ref 4.5–11.5)

## 2025-03-01 PROCEDURE — 6360000002 HC RX W HCPCS

## 2025-03-01 PROCEDURE — 2000000000 HC ICU R&B

## 2025-03-01 PROCEDURE — P9016 RBC LEUKOCYTES REDUCED: HCPCS

## 2025-03-01 PROCEDURE — 6360000002 HC RX W HCPCS: Performed by: STUDENT IN AN ORGANIZED HEALTH CARE EDUCATION/TRAINING PROGRAM

## 2025-03-01 PROCEDURE — 6370000000 HC RX 637 (ALT 250 FOR IP): Performed by: STUDENT IN AN ORGANIZED HEALTH CARE EDUCATION/TRAINING PROGRAM

## 2025-03-01 PROCEDURE — 2580000003 HC RX 258

## 2025-03-01 PROCEDURE — 82805 BLOOD GASES W/O2 SATURATION: CPT

## 2025-03-01 PROCEDURE — 83735 ASSAY OF MAGNESIUM: CPT

## 2025-03-01 PROCEDURE — 94003 VENT MGMT INPAT SUBQ DAY: CPT

## 2025-03-01 PROCEDURE — 85610 PROTHROMBIN TIME: CPT

## 2025-03-01 PROCEDURE — 80053 COMPREHEN METABOLIC PANEL: CPT

## 2025-03-01 PROCEDURE — 94640 AIRWAY INHALATION TREATMENT: CPT

## 2025-03-01 PROCEDURE — 85014 HEMATOCRIT: CPT

## 2025-03-01 PROCEDURE — 2500000003 HC RX 250 WO HCPCS: Performed by: FAMILY MEDICINE

## 2025-03-01 PROCEDURE — 86901 BLOOD TYPING SEROLOGIC RH(D): CPT

## 2025-03-01 PROCEDURE — 85730 THROMBOPLASTIN TIME PARTIAL: CPT

## 2025-03-01 PROCEDURE — 2500000003 HC RX 250 WO HCPCS

## 2025-03-01 PROCEDURE — 86850 RBC ANTIBODY SCREEN: CPT

## 2025-03-01 PROCEDURE — 71045 X-RAY EXAM CHEST 1 VIEW: CPT

## 2025-03-01 PROCEDURE — 85018 HEMOGLOBIN: CPT

## 2025-03-01 PROCEDURE — 6370000000 HC RX 637 (ALT 250 FOR IP)

## 2025-03-01 PROCEDURE — 36430 TRANSFUSION BLD/BLD COMPNT: CPT

## 2025-03-01 PROCEDURE — 86923 COMPATIBILITY TEST ELECTRIC: CPT

## 2025-03-01 PROCEDURE — 84100 ASSAY OF PHOSPHORUS: CPT

## 2025-03-01 PROCEDURE — 86900 BLOOD TYPING SEROLOGIC ABO: CPT

## 2025-03-01 PROCEDURE — 85025 COMPLETE CBC W/AUTO DIFF WBC: CPT

## 2025-03-01 PROCEDURE — 74018 RADEX ABDOMEN 1 VIEW: CPT

## 2025-03-01 PROCEDURE — 2580000003 HC RX 258: Performed by: STUDENT IN AN ORGANIZED HEALTH CARE EDUCATION/TRAINING PROGRAM

## 2025-03-01 RX ORDER — SODIUM CHLORIDE 9 MG/ML
INJECTION, SOLUTION INTRAVENOUS PRN
Status: DISCONTINUED | OUTPATIENT
Start: 2025-03-01 | End: 2025-03-04

## 2025-03-01 RX ORDER — FENTANYL CITRATE 50 UG/ML
50 INJECTION, SOLUTION INTRAMUSCULAR; INTRAVENOUS EVERY 4 HOURS PRN
Status: DISCONTINUED | OUTPATIENT
Start: 2025-03-01 | End: 2025-03-02

## 2025-03-01 RX ORDER — POTASSIUM CHLORIDE 7.45 MG/ML
10 INJECTION INTRAVENOUS ONCE
Status: COMPLETED | OUTPATIENT
Start: 2025-03-01 | End: 2025-03-01

## 2025-03-01 RX ADMIN — IPRATROPIUM BROMIDE AND ALBUTEROL SULFATE 1 DOSE: 2.5; .5 SOLUTION RESPIRATORY (INHALATION) at 08:50

## 2025-03-01 RX ADMIN — LORAZEPAM 2 MG: 2 INJECTION INTRAMUSCULAR; INTRAVENOUS at 03:55

## 2025-03-01 RX ADMIN — IPRATROPIUM BROMIDE AND ALBUTEROL SULFATE 1 DOSE: 2.5; .5 SOLUTION RESPIRATORY (INHALATION) at 12:40

## 2025-03-01 RX ADMIN — MINERAL OIL, WHITE PETROLATUM: .03; .94 OINTMENT OPHTHALMIC at 14:31

## 2025-03-01 RX ADMIN — LORAZEPAM 2 MG: 2 INJECTION INTRAMUSCULAR; INTRAVENOUS at 08:46

## 2025-03-01 RX ADMIN — CHLORHEXIDINE GLUCONATE, 0.12% ORAL RINSE 15 ML: 1.2 SOLUTION DENTAL at 19:56

## 2025-03-01 RX ADMIN — MINERAL OIL, WHITE PETROLATUM: .03; .94 OINTMENT OPHTHALMIC at 05:20

## 2025-03-01 RX ADMIN — SODIUM CHLORIDE, PRESERVATIVE FREE 10 ML: 5 INJECTION INTRAVENOUS at 08:14

## 2025-03-01 RX ADMIN — MINERAL OIL, WHITE PETROLATUM: .03; .94 OINTMENT OPHTHALMIC at 10:31

## 2025-03-01 RX ADMIN — MINERAL OIL, WHITE PETROLATUM: .03; .94 OINTMENT OPHTHALMIC at 19:58

## 2025-03-01 RX ADMIN — POLYVINYL ALCOHOL, POVIDONE 1 DROP: 14; 6 SOLUTION/ DROPS OPHTHALMIC at 00:47

## 2025-03-01 RX ADMIN — LACTULOSE 20 G: 10 SOLUTION ORAL at 14:31

## 2025-03-01 RX ADMIN — IPRATROPIUM BROMIDE AND ALBUTEROL SULFATE 1 DOSE: 2.5; .5 SOLUTION RESPIRATORY (INHALATION) at 21:41

## 2025-03-01 RX ADMIN — BUDESONIDE INHALATION 500 MCG: 0.5 SUSPENSION RESPIRATORY (INHALATION) at 21:41

## 2025-03-01 RX ADMIN — RIFAXIMIN 400 MG: 200 TABLET ORAL at 20:08

## 2025-03-01 RX ADMIN — SODIUM CHLORIDE 3000 MG: 9 INJECTION, SOLUTION INTRAVENOUS at 01:24

## 2025-03-01 RX ADMIN — LACTULOSE 20 G: 10 SOLUTION ORAL at 19:57

## 2025-03-01 RX ADMIN — MINERAL OIL, WHITE PETROLATUM: .03; .94 OINTMENT OPHTHALMIC at 08:12

## 2025-03-01 RX ADMIN — THIAMINE HYDROCHLORIDE 100 MG: 100 INJECTION, SOLUTION INTRAMUSCULAR; INTRAVENOUS at 09:00

## 2025-03-01 RX ADMIN — SODIUM CHLORIDE 3000 MG: 9 INJECTION, SOLUTION INTRAVENOUS at 08:53

## 2025-03-01 RX ADMIN — POTASSIUM CHLORIDE 10 MEQ: 10 INJECTION, SOLUTION INTRAVENOUS at 06:48

## 2025-03-01 RX ADMIN — Medication 4 ML: at 21:44

## 2025-03-01 RX ADMIN — BUMETANIDE 2 MG: 0.25 INJECTION INTRAMUSCULAR; INTRAVENOUS at 08:46

## 2025-03-01 RX ADMIN — SODIUM CHLORIDE, PRESERVATIVE FREE 40 MG: 5 INJECTION INTRAVENOUS at 08:46

## 2025-03-01 RX ADMIN — Medication 4 ML: at 08:50

## 2025-03-01 RX ADMIN — CHLORHEXIDINE GLUCONATE, 0.12% ORAL RINSE 15 ML: 1.2 SOLUTION DENTAL at 08:13

## 2025-03-01 RX ADMIN — SODIUM CHLORIDE, PRESERVATIVE FREE 10 ML: 5 INJECTION INTRAVENOUS at 19:57

## 2025-03-01 RX ADMIN — ARFORMOTEROL TARTRATE 15 MCG: 15 SOLUTION RESPIRATORY (INHALATION) at 21:41

## 2025-03-01 RX ADMIN — SODIUM CHLORIDE, PRESERVATIVE FREE 10 ML: 5 INJECTION INTRAVENOUS at 08:12

## 2025-03-01 RX ADMIN — LORAZEPAM 2 MG: 2 INJECTION INTRAMUSCULAR; INTRAVENOUS at 22:11

## 2025-03-01 RX ADMIN — SODIUM CHLORIDE, PRESERVATIVE FREE 40 MG: 5 INJECTION INTRAVENOUS at 19:57

## 2025-03-01 RX ADMIN — ARFORMOTEROL TARTRATE 15 MCG: 15 SOLUTION RESPIRATORY (INHALATION) at 08:50

## 2025-03-01 RX ADMIN — IPRATROPIUM BROMIDE AND ALBUTEROL SULFATE 1 DOSE: 2.5; .5 SOLUTION RESPIRATORY (INHALATION) at 17:01

## 2025-03-01 RX ADMIN — RIFAXIMIN 400 MG: 200 TABLET ORAL at 14:31

## 2025-03-01 RX ADMIN — BUDESONIDE INHALATION 500 MCG: 0.5 SUSPENSION RESPIRATORY (INHALATION) at 08:50

## 2025-03-01 RX ADMIN — LORAZEPAM 2 MG: 2 INJECTION INTRAMUSCULAR; INTRAVENOUS at 14:29

## 2025-03-01 RX ADMIN — BUMETANIDE 2 MG: 0.25 INJECTION INTRAMUSCULAR; INTRAVENOUS at 17:28

## 2025-03-01 RX ADMIN — FENTANYL CITRATE 50 MCG: 50 INJECTION INTRAMUSCULAR; INTRAVENOUS at 20:04

## 2025-03-01 ASSESSMENT — PULMONARY FUNCTION TESTS
PIF_VALUE: 27
PIF_VALUE: 28
PIF_VALUE: 42
PIF_VALUE: 30
PIF_VALUE: 30
PIF_VALUE: 26
PIF_VALUE: 41
PIF_VALUE: 30
PIF_VALUE: 29
PIF_VALUE: 29
PIF_VALUE: 28
PIF_VALUE: 29
PIF_VALUE: 32
PIF_VALUE: 27
PIF_VALUE: 32
PIF_VALUE: 31
PIF_VALUE: 28
PIF_VALUE: 30
PIF_VALUE: 27
PIF_VALUE: 29
PIF_VALUE: 32
PIF_VALUE: 26
PIF_VALUE: 28
PIF_VALUE: 30
PIF_VALUE: 31
PIF_VALUE: 33
PIF_VALUE: 30
PIF_VALUE: 35
PIF_VALUE: 28

## 2025-03-01 ASSESSMENT — PAIN SCALES - GENERAL
PAINLEVEL_OUTOF10: 0
PAINLEVEL_OUTOF10: 4
PAINLEVEL_OUTOF10: 0
PAINLEVEL_OUTOF10: 7

## 2025-03-02 ENCOUNTER — APPOINTMENT (OUTPATIENT)
Dept: GENERAL RADIOLOGY | Age: 60
DRG: 207 | End: 2025-03-02
Payer: MEDICARE

## 2025-03-02 LAB
AADO2: 138.9 MMHG
ABO/RH: NORMAL
ALBUMIN SERPL-MCNC: 2.9 G/DL (ref 3.5–5.2)
ALP SERPL-CCNC: 190 U/L (ref 40–129)
ALT SERPL-CCNC: 15 U/L (ref 0–40)
ANION GAP SERPL CALCULATED.3IONS-SCNC: 14 MMOL/L (ref 7–16)
ANTIBODY SCREEN: NEGATIVE
ARM BAND NUMBER: NORMAL
AST SERPL-CCNC: 41 U/L (ref 0–39)
B.E.: 0.7 MMOL/L (ref -3–3)
BASOPHILS # BLD: 0.05 K/UL (ref 0–0.2)
BASOPHILS NFR BLD: 1 % (ref 0–2)
BILIRUB SERPL-MCNC: 1.9 MG/DL (ref 0–1.2)
BLOOD BANK BLOOD PRODUCT EXPIRATION DATE: NORMAL
BLOOD BANK DISPENSE STATUS: NORMAL
BLOOD BANK ISBT PRODUCT BLOOD TYPE: 9500
BLOOD BANK PRODUCT CODE: NORMAL
BLOOD BANK SAMPLE EXPIRATION: NORMAL
BLOOD BANK UNIT TYPE AND RH: NORMAL
BPU ID: NORMAL
BUN SERPL-MCNC: 31 MG/DL (ref 6–20)
CALCIUM SERPL-MCNC: 8.5 MG/DL (ref 8.6–10.2)
CHLORIDE SERPL-SCNC: 110 MMOL/L (ref 98–107)
CO2 SERPL-SCNC: 24 MMOL/L (ref 22–29)
COHB: 1.4 % (ref 0–1.5)
COMPONENT: NORMAL
CREAT SERPL-MCNC: 2.7 MG/DL (ref 0.7–1.2)
CRITICAL: ABNORMAL
CROSSMATCH RESULT: NORMAL
DATE ANALYZED: ABNORMAL
DATE OF COLLECTION: ABNORMAL
EOSINOPHIL # BLD: 0.24 K/UL (ref 0.05–0.5)
EOSINOPHILS RELATIVE PERCENT: 3 % (ref 0–6)
ERYTHROCYTE [DISTWIDTH] IN BLOOD BY AUTOMATED COUNT: 22.7 % (ref 11.5–15)
FIO2: 40 %
GFR, ESTIMATED: 27 ML/MIN/1.73M2
GLUCOSE SERPL-MCNC: 84 MG/DL (ref 74–99)
HCO3: 24.8 MMOL/L (ref 22–26)
HCT VFR BLD AUTO: 25.1 % (ref 37–54)
HGB BLD-MCNC: 7.4 G/DL (ref 12.5–16.5)
HHB: 2.5 % (ref 0–5)
IMM GRANULOCYTES # BLD AUTO: 0.06 K/UL (ref 0–0.58)
IMM GRANULOCYTES NFR BLD: 1 % (ref 0–5)
INR PPP: 1.3
LAB: ABNORMAL
LYMPHOCYTES NFR BLD: 0.64 K/UL (ref 1.5–4)
LYMPHOCYTES RELATIVE PERCENT: 8 % (ref 20–42)
Lab: 357
MAGNESIUM SERPL-MCNC: 1.9 MG/DL (ref 1.6–2.6)
MCH RBC QN AUTO: 30.1 PG (ref 26–35)
MCHC RBC AUTO-ENTMCNC: 29.5 G/DL (ref 32–34.5)
MCV RBC AUTO: 102 FL (ref 80–99.9)
METHB: 0.7 % (ref 0–1.5)
MODE: AC
MONOCYTES NFR BLD: 0.91 K/UL (ref 0.1–0.95)
MONOCYTES NFR BLD: 11 % (ref 2–12)
NEUTROPHILS NFR BLD: 77 % (ref 43–80)
NEUTS SEG NFR BLD: 6.39 K/UL (ref 1.8–7.3)
O2 SATURATION: 97.4 % (ref 92–98.5)
O2HB: 95.4 % (ref 94–97)
OPERATOR ID: 3214
PARTIAL THROMBOPLASTIN TIME: 32.1 SEC (ref 24.5–35.1)
PARTIAL THROMBOPLASTIN TIME: 32.4 SEC (ref 24.5–35.1)
PARTIAL THROMBOPLASTIN TIME: 38.7 SEC (ref 24.5–35.1)
PARTIAL THROMBOPLASTIN TIME: 40.2 SEC (ref 24.5–35.1)
PATIENT TEMP: 37 C
PCO2: 37.4 MMHG (ref 35–45)
PEEP/CPAP: 8 CMH2O
PFO2: 2.58 MMHG/%
PH BLOOD GAS: 7.44 (ref 7.35–7.45)
PHOSPHATE SERPL-MCNC: 4.2 MG/DL (ref 2.5–4.5)
PLATELET CONFIRMATION: NORMAL
PLATELET, FLUORESCENCE: 94 K/UL (ref 130–450)
PMV BLD AUTO: 13.6 FL (ref 7–12)
PO2: 103.3 MMHG (ref 75–100)
POTASSIUM SERPL-SCNC: 3.5 MMOL/L (ref 3.5–5)
PROT SERPL-MCNC: 6.3 G/DL (ref 6.4–8.3)
PROTHROMBIN TIME: 14.5 SEC (ref 9.3–12.4)
RBC # BLD AUTO: 2.46 M/UL (ref 3.8–5.8)
RBC # BLD: ABNORMAL 10*6/UL
RI(T): 1.34
RR MECHANICAL: 12 B/MIN
SODIUM SERPL-SCNC: 148 MMOL/L (ref 132–146)
SOURCE, BLOOD GAS: ABNORMAL
THB: 8.4 G/DL (ref 11.5–16.5)
TIME ANALYZED: 359
TRANSFUSION STATUS: NORMAL
UNIT DIVISION: 0
UNIT ISSUE DATE/TIME: NORMAL
VT MECHANICAL: 450 ML
WBC OTHER # BLD: 8.3 K/UL (ref 4.5–11.5)

## 2025-03-02 PROCEDURE — 85610 PROTHROMBIN TIME: CPT

## 2025-03-02 PROCEDURE — 2500000003 HC RX 250 WO HCPCS

## 2025-03-02 PROCEDURE — 6360000002 HC RX W HCPCS

## 2025-03-02 PROCEDURE — 85730 THROMBOPLASTIN TIME PARTIAL: CPT

## 2025-03-02 PROCEDURE — 71045 X-RAY EXAM CHEST 1 VIEW: CPT

## 2025-03-02 PROCEDURE — 82805 BLOOD GASES W/O2 SATURATION: CPT

## 2025-03-02 PROCEDURE — 85025 COMPLETE CBC W/AUTO DIFF WBC: CPT

## 2025-03-02 PROCEDURE — 83735 ASSAY OF MAGNESIUM: CPT

## 2025-03-02 PROCEDURE — 6370000000 HC RX 637 (ALT 250 FOR IP)

## 2025-03-02 PROCEDURE — 2580000003 HC RX 258: Performed by: STUDENT IN AN ORGANIZED HEALTH CARE EDUCATION/TRAINING PROGRAM

## 2025-03-02 PROCEDURE — 84100 ASSAY OF PHOSPHORUS: CPT

## 2025-03-02 PROCEDURE — 80053 COMPREHEN METABOLIC PANEL: CPT

## 2025-03-02 PROCEDURE — 94003 VENT MGMT INPAT SUBQ DAY: CPT

## 2025-03-02 PROCEDURE — 2580000003 HC RX 258

## 2025-03-02 PROCEDURE — 2500000003 HC RX 250 WO HCPCS: Performed by: FAMILY MEDICINE

## 2025-03-02 PROCEDURE — 94640 AIRWAY INHALATION TREATMENT: CPT

## 2025-03-02 PROCEDURE — 6370000000 HC RX 637 (ALT 250 FOR IP): Performed by: STUDENT IN AN ORGANIZED HEALTH CARE EDUCATION/TRAINING PROGRAM

## 2025-03-02 PROCEDURE — 6360000002 HC RX W HCPCS: Performed by: STUDENT IN AN ORGANIZED HEALTH CARE EDUCATION/TRAINING PROGRAM

## 2025-03-02 PROCEDURE — 2000000000 HC ICU R&B

## 2025-03-02 PROCEDURE — 99232 SBSQ HOSP IP/OBS MODERATE 35: CPT | Performed by: STUDENT IN AN ORGANIZED HEALTH CARE EDUCATION/TRAINING PROGRAM

## 2025-03-02 RX ORDER — MAGNESIUM SULFATE 1 G/100ML
1000 INJECTION INTRAVENOUS ONCE
Status: COMPLETED | OUTPATIENT
Start: 2025-03-02 | End: 2025-03-02

## 2025-03-02 RX ORDER — BUMETANIDE 0.25 MG/ML
2 INJECTION, SOLUTION INTRAMUSCULAR; INTRAVENOUS ONCE
Status: COMPLETED | OUTPATIENT
Start: 2025-03-02 | End: 2025-03-02

## 2025-03-02 RX ORDER — LORAZEPAM 2 MG/ML
2 INJECTION INTRAMUSCULAR EVERY 12 HOURS
Status: DISCONTINUED | OUTPATIENT
Start: 2025-03-02 | End: 2025-03-04

## 2025-03-02 RX ORDER — POTASSIUM CHLORIDE 29.8 MG/ML
40 INJECTION INTRAVENOUS ONCE
Status: COMPLETED | OUTPATIENT
Start: 2025-03-02 | End: 2025-03-02

## 2025-03-02 RX ADMIN — RIFAXIMIN 400 MG: 200 TABLET ORAL at 22:12

## 2025-03-02 RX ADMIN — LACTULOSE 20 G: 10 SOLUTION ORAL at 14:31

## 2025-03-02 RX ADMIN — RIFAXIMIN 400 MG: 200 TABLET ORAL at 08:29

## 2025-03-02 RX ADMIN — IPRATROPIUM BROMIDE AND ALBUTEROL SULFATE 1 DOSE: 2.5; .5 SOLUTION RESPIRATORY (INHALATION) at 08:16

## 2025-03-02 RX ADMIN — LACTULOSE 20 G: 10 SOLUTION ORAL at 22:43

## 2025-03-02 RX ADMIN — CHLORHEXIDINE GLUCONATE, 0.12% ORAL RINSE 15 ML: 1.2 SOLUTION DENTAL at 08:12

## 2025-03-02 RX ADMIN — ARFORMOTEROL TARTRATE 15 MCG: 15 SOLUTION RESPIRATORY (INHALATION) at 08:15

## 2025-03-02 RX ADMIN — POTASSIUM CHLORIDE 40 MEQ: 29.8 INJECTION, SOLUTION INTRAVENOUS at 06:00

## 2025-03-02 RX ADMIN — IPRATROPIUM BROMIDE AND ALBUTEROL SULFATE 1 DOSE: 2.5; .5 SOLUTION RESPIRATORY (INHALATION) at 12:18

## 2025-03-02 RX ADMIN — HEPARIN SODIUM 9 UNITS/KG/HR: 10000 INJECTION, SOLUTION INTRAVENOUS at 10:21

## 2025-03-02 RX ADMIN — MINERAL OIL, WHITE PETROLATUM: .03; .94 OINTMENT OPHTHALMIC at 07:39

## 2025-03-02 RX ADMIN — LORAZEPAM 2 MG: 2 INJECTION INTRAMUSCULAR; INTRAVENOUS at 03:46

## 2025-03-02 RX ADMIN — Medication 4 ML: at 08:16

## 2025-03-02 RX ADMIN — SODIUM CHLORIDE, PRESERVATIVE FREE 10 ML: 5 INJECTION INTRAVENOUS at 07:40

## 2025-03-02 RX ADMIN — DEXMEDETOMIDINE 0.2 MCG/KG/HR: 100 INJECTION, SOLUTION INTRAVENOUS at 14:31

## 2025-03-02 RX ADMIN — FENTANYL CITRATE 50 MCG: 50 INJECTION INTRAMUSCULAR; INTRAVENOUS at 08:11

## 2025-03-02 RX ADMIN — BUMETANIDE 2 MG: 0.25 INJECTION INTRAMUSCULAR; INTRAVENOUS at 09:51

## 2025-03-02 RX ADMIN — LACTULOSE 20 G: 10 SOLUTION ORAL at 08:29

## 2025-03-02 RX ADMIN — THIAMINE HYDROCHLORIDE 100 MG: 100 INJECTION, SOLUTION INTRAMUSCULAR; INTRAVENOUS at 08:30

## 2025-03-02 RX ADMIN — ARFORMOTEROL TARTRATE 15 MCG: 15 SOLUTION RESPIRATORY (INHALATION) at 20:39

## 2025-03-02 RX ADMIN — HEPARIN SODIUM 2000 UNITS: 1000 INJECTION INTRAVENOUS; SUBCUTANEOUS at 23:37

## 2025-03-02 RX ADMIN — MINERAL OIL, WHITE PETROLATUM: .03; .94 OINTMENT OPHTHALMIC at 14:32

## 2025-03-02 RX ADMIN — POLYVINYL ALCOHOL, POVIDONE 1 DROP: 14; 6 SOLUTION/ DROPS OPHTHALMIC at 03:47

## 2025-03-02 RX ADMIN — HEPARIN SODIUM 2000 UNITS: 1000 INJECTION INTRAVENOUS; SUBCUTANEOUS at 16:41

## 2025-03-02 RX ADMIN — SODIUM CHLORIDE, PRESERVATIVE FREE 10 ML: 5 INJECTION INTRAVENOUS at 08:13

## 2025-03-02 RX ADMIN — FENTANYL CITRATE 50 MCG: 50 INJECTION INTRAMUSCULAR; INTRAVENOUS at 06:01

## 2025-03-02 RX ADMIN — POLYVINYL ALCOHOL, POVIDONE 1 DROP: 14; 6 SOLUTION/ DROPS OPHTHALMIC at 22:13

## 2025-03-02 RX ADMIN — RIFAXIMIN 400 MG: 200 TABLET ORAL at 14:31

## 2025-03-02 RX ADMIN — LORAZEPAM 2 MG: 2 INJECTION INTRAMUSCULAR; INTRAVENOUS at 08:30

## 2025-03-02 RX ADMIN — POLYVINYL ALCOHOL, POVIDONE 1 DROP: 14; 6 SOLUTION/ DROPS OPHTHALMIC at 00:44

## 2025-03-02 RX ADMIN — SODIUM CHLORIDE, PRESERVATIVE FREE 10 ML: 5 INJECTION INTRAVENOUS at 22:45

## 2025-03-02 RX ADMIN — BUDESONIDE INHALATION 500 MCG: 0.5 SUSPENSION RESPIRATORY (INHALATION) at 20:39

## 2025-03-02 RX ADMIN — SODIUM CHLORIDE, PRESERVATIVE FREE 10 ML: 5 INJECTION INTRAVENOUS at 22:13

## 2025-03-02 RX ADMIN — IPRATROPIUM BROMIDE AND ALBUTEROL SULFATE 1 DOSE: 2.5; .5 SOLUTION RESPIRATORY (INHALATION) at 15:43

## 2025-03-02 RX ADMIN — BUDESONIDE INHALATION 500 MCG: 0.5 SUSPENSION RESPIRATORY (INHALATION) at 08:15

## 2025-03-02 RX ADMIN — FENTANYL CITRATE 50 MCG: 50 INJECTION INTRAMUSCULAR; INTRAVENOUS at 02:11

## 2025-03-02 RX ADMIN — SODIUM CHLORIDE, PRESERVATIVE FREE 10 ML: 5 INJECTION INTRAVENOUS at 07:39

## 2025-03-02 RX ADMIN — CHLORHEXIDINE GLUCONATE, 0.12% ORAL RINSE 15 ML: 1.2 SOLUTION DENTAL at 22:13

## 2025-03-02 RX ADMIN — LORAZEPAM 2 MG: 2 INJECTION INTRAMUSCULAR; INTRAVENOUS at 22:44

## 2025-03-02 RX ADMIN — IPRATROPIUM BROMIDE AND ALBUTEROL SULFATE 1 DOSE: 2.5; .5 SOLUTION RESPIRATORY (INHALATION) at 20:39

## 2025-03-02 RX ADMIN — Medication 400 MG: at 08:13

## 2025-03-02 RX ADMIN — MAGNESIUM SULFATE HEPTAHYDRATE 1000 MG: 1 INJECTION, SOLUTION INTRAVENOUS at 05:58

## 2025-03-02 RX ADMIN — SODIUM CHLORIDE, PRESERVATIVE FREE 40 MG: 5 INJECTION INTRAVENOUS at 08:11

## 2025-03-02 RX ADMIN — Medication 4 ML: at 20:40

## 2025-03-02 RX ADMIN — SODIUM CHLORIDE, PRESERVATIVE FREE 40 MG: 5 INJECTION INTRAVENOUS at 22:13

## 2025-03-02 ASSESSMENT — PULMONARY FUNCTION TESTS
PIF_VALUE: 26
PIF_VALUE: 27
PIF_VALUE: 27
PIF_VALUE: 28
PIF_VALUE: 26
PIF_VALUE: 27
PIF_VALUE: 27
PIF_VALUE: 26
PIF_VALUE: 25
PIF_VALUE: 27
PIF_VALUE: 27
PIF_VALUE: 29
PIF_VALUE: 26
PIF_VALUE: 26
PIF_VALUE: 27
PIF_VALUE: 17
PIF_VALUE: 27
PIF_VALUE: 19
PIF_VALUE: 28
PIF_VALUE: 28
PIF_VALUE: 34
PIF_VALUE: 30
PIF_VALUE: 27
PIF_VALUE: 26
PIF_VALUE: 27
PIF_VALUE: 26
PIF_VALUE: 26
PIF_VALUE: 27
PIF_VALUE: 28
PIF_VALUE: 19

## 2025-03-03 ENCOUNTER — APPOINTMENT (OUTPATIENT)
Dept: GENERAL RADIOLOGY | Age: 60
DRG: 207 | End: 2025-03-03
Payer: MEDICARE

## 2025-03-03 LAB
AADO2: 119.9 MMHG
ALBUMIN SERPL-MCNC: 3.2 G/DL (ref 3.5–5.2)
ALP SERPL-CCNC: 206 U/L (ref 40–129)
ALT SERPL-CCNC: 17 U/L (ref 0–40)
ANION GAP SERPL CALCULATED.3IONS-SCNC: 11 MMOL/L (ref 7–16)
AST SERPL-CCNC: 48 U/L (ref 0–39)
B.E.: -1 MMOL/L (ref -3–3)
BASOPHILS # BLD: 0.08 K/UL (ref 0–0.2)
BASOPHILS NFR BLD: 1 % (ref 0–2)
BILIRUB SERPL-MCNC: 1.7 MG/DL (ref 0–1.2)
BUN SERPL-MCNC: 33 MG/DL (ref 6–20)
CALCIUM SERPL-MCNC: 8.7 MG/DL (ref 8.6–10.2)
CHLORIDE SERPL-SCNC: 113 MMOL/L (ref 98–107)
CO2 SERPL-SCNC: 26 MMOL/L (ref 22–29)
COHB: 0.9 % (ref 0–1.5)
CREAT SERPL-MCNC: 2.6 MG/DL (ref 0.7–1.2)
CRITICAL: ABNORMAL
DATE ANALYZED: ABNORMAL
DATE OF COLLECTION: ABNORMAL
EOSINOPHIL # BLD: 0.08 K/UL (ref 0.05–0.5)
EOSINOPHILS RELATIVE PERCENT: 1 % (ref 0–6)
ERYTHROCYTE [DISTWIDTH] IN BLOOD BY AUTOMATED COUNT: 22.5 % (ref 11.5–15)
FIO2: 35 %
GFR, ESTIMATED: 28 ML/MIN/1.73M2
GLUCOSE SERPL-MCNC: 105 MG/DL (ref 74–99)
HCO3: 22.4 MMOL/L (ref 22–26)
HCT VFR BLD AUTO: 28.5 % (ref 37–54)
HGB BLD-MCNC: 8.1 G/DL (ref 12.5–16.5)
HHB: 3.4 % (ref 0–5)
INR PPP: 1.4
LAB: ABNORMAL
LYMPHOCYTES NFR BLD: 0.75 K/UL (ref 1.5–4)
LYMPHOCYTES RELATIVE PERCENT: 9 % (ref 20–42)
Lab: 605
MAGNESIUM SERPL-MCNC: 2.4 MG/DL (ref 1.6–2.6)
MCH RBC QN AUTO: 29.8 PG (ref 26–35)
MCHC RBC AUTO-ENTMCNC: 28.4 G/DL (ref 32–34.5)
MCV RBC AUTO: 104.8 FL (ref 80–99.9)
METHB: 0.5 % (ref 0–1.5)
MODE: AC
MONOCYTES NFR BLD: 0.38 K/UL (ref 0.1–0.95)
MONOCYTES NFR BLD: 4 % (ref 2–12)
NEUTROPHILS NFR BLD: 85 % (ref 43–80)
NEUTS SEG NFR BLD: 7.22 K/UL (ref 1.8–7.3)
O2 SATURATION: 96.6 % (ref 92–98.5)
O2HB: 95.2 % (ref 94–97)
OPERATOR ID: ABNORMAL
PARTIAL THROMBOPLASTIN TIME: 54.7 SEC (ref 24.5–35.1)
PARTIAL THROMBOPLASTIN TIME: 60.2 SEC (ref 24.5–35.1)
PATIENT TEMP: 37 C
PCO2: 32.1 MMHG (ref 35–45)
PEEP/CPAP: 8 CMH2O
PFO2: 2.64 MMHG/%
PH BLOOD GAS: 7.46 (ref 7.35–7.45)
PHOSPHATE SERPL-MCNC: 3.9 MG/DL (ref 2.5–4.5)
PLATELET CONFIRMATION: NORMAL
PLATELET, FLUORESCENCE: 98 K/UL (ref 130–450)
PMV BLD AUTO: 13.7 FL (ref 7–12)
PO2: 92.3 MMHG (ref 75–100)
POTASSIUM SERPL-SCNC: 4.1 MMOL/L (ref 3.5–5)
PROT SERPL-MCNC: 6.6 G/DL (ref 6.4–8.3)
PROTHROMBIN TIME: 15.4 SEC (ref 9.3–12.4)
RBC # BLD AUTO: 2.72 M/UL (ref 3.8–5.8)
RBC # BLD: ABNORMAL 10*6/UL
RI(T): 1.3
RR MECHANICAL: 12 B/MIN
SODIUM SERPL-SCNC: 150 MMOL/L (ref 132–146)
SOURCE, BLOOD GAS: ABNORMAL
THB: 9.2 G/DL (ref 11.5–16.5)
TIME ANALYZED: 608
VT MECHANICAL: 450 ML
WBC OTHER # BLD: 8.5 K/UL (ref 4.5–11.5)

## 2025-03-03 PROCEDURE — 6370000000 HC RX 637 (ALT 250 FOR IP)

## 2025-03-03 PROCEDURE — 2500000003 HC RX 250 WO HCPCS

## 2025-03-03 PROCEDURE — 6370000000 HC RX 637 (ALT 250 FOR IP): Performed by: STUDENT IN AN ORGANIZED HEALTH CARE EDUCATION/TRAINING PROGRAM

## 2025-03-03 PROCEDURE — 6360000002 HC RX W HCPCS

## 2025-03-03 PROCEDURE — 2720000010 HC SURG SUPPLY STERILE

## 2025-03-03 PROCEDURE — 94003 VENT MGMT INPAT SUBQ DAY: CPT

## 2025-03-03 PROCEDURE — 85730 THROMBOPLASTIN TIME PARTIAL: CPT

## 2025-03-03 PROCEDURE — 85610 PROTHROMBIN TIME: CPT

## 2025-03-03 PROCEDURE — 85025 COMPLETE CBC W/AUTO DIFF WBC: CPT

## 2025-03-03 PROCEDURE — 6360000002 HC RX W HCPCS: Performed by: STUDENT IN AN ORGANIZED HEALTH CARE EDUCATION/TRAINING PROGRAM

## 2025-03-03 PROCEDURE — 87077 CULTURE AEROBIC IDENTIFY: CPT

## 2025-03-03 PROCEDURE — 87102 FUNGUS ISOLATION CULTURE: CPT

## 2025-03-03 PROCEDURE — 71045 X-RAY EXAM CHEST 1 VIEW: CPT

## 2025-03-03 PROCEDURE — 83735 ASSAY OF MAGNESIUM: CPT

## 2025-03-03 PROCEDURE — 2580000003 HC RX 258

## 2025-03-03 PROCEDURE — 0BJ08ZZ INSPECTION OF TRACHEOBRONCHIAL TREE, VIA NATURAL OR ARTIFICIAL OPENING ENDOSCOPIC: ICD-10-PCS | Performed by: FAMILY MEDICINE

## 2025-03-03 PROCEDURE — 2000000000 HC ICU R&B

## 2025-03-03 PROCEDURE — 87106 FUNGI IDENTIFICATION YEAST: CPT

## 2025-03-03 PROCEDURE — 2500000003 HC RX 250 WO HCPCS: Performed by: FAMILY MEDICINE

## 2025-03-03 PROCEDURE — 82805 BLOOD GASES W/O2 SATURATION: CPT

## 2025-03-03 PROCEDURE — 87070 CULTURE OTHR SPECIMN AEROBIC: CPT

## 2025-03-03 PROCEDURE — 94640 AIRWAY INHALATION TREATMENT: CPT

## 2025-03-03 PROCEDURE — 31624 DX BRONCHOSCOPE/LAVAGE: CPT

## 2025-03-03 PROCEDURE — 87181 SC STD AGAR DILUTION PER AGT: CPT

## 2025-03-03 PROCEDURE — 80053 COMPREHEN METABOLIC PANEL: CPT

## 2025-03-03 PROCEDURE — 2580000003 HC RX 258: Performed by: STUDENT IN AN ORGANIZED HEALTH CARE EDUCATION/TRAINING PROGRAM

## 2025-03-03 PROCEDURE — 87205 SMEAR GRAM STAIN: CPT

## 2025-03-03 PROCEDURE — 99291 CRITICAL CARE FIRST HOUR: CPT | Performed by: INTERNAL MEDICINE

## 2025-03-03 PROCEDURE — 99232 SBSQ HOSP IP/OBS MODERATE 35: CPT | Performed by: STUDENT IN AN ORGANIZED HEALTH CARE EDUCATION/TRAINING PROGRAM

## 2025-03-03 PROCEDURE — 84100 ASSAY OF PHOSPHORUS: CPT

## 2025-03-03 RX ORDER — MIDAZOLAM HYDROCHLORIDE 2 MG/2ML
4 INJECTION, SOLUTION INTRAMUSCULAR; INTRAVENOUS ONCE
Status: COMPLETED | OUTPATIENT
Start: 2025-03-03 | End: 2025-03-03

## 2025-03-03 RX ORDER — MIDAZOLAM HYDROCHLORIDE 1 MG/ML
INJECTION, SOLUTION INTRAMUSCULAR; INTRAVENOUS
Status: COMPLETED
Start: 2025-03-03 | End: 2025-03-03

## 2025-03-03 RX ORDER — BUMETANIDE 0.25 MG/ML
2 INJECTION, SOLUTION INTRAMUSCULAR; INTRAVENOUS DAILY
Status: DISCONTINUED | OUTPATIENT
Start: 2025-03-03 | End: 2025-03-04

## 2025-03-03 RX ADMIN — RIFAXIMIN 400 MG: 200 TABLET ORAL at 20:06

## 2025-03-03 RX ADMIN — THIAMINE HYDROCHLORIDE 100 MG: 100 INJECTION, SOLUTION INTRAMUSCULAR; INTRAVENOUS at 09:23

## 2025-03-03 RX ADMIN — BUDESONIDE INHALATION 500 MCG: 0.5 SUSPENSION RESPIRATORY (INHALATION) at 08:19

## 2025-03-03 RX ADMIN — DEXMEDETOMIDINE 0.4 MCG/KG/HR: 100 INJECTION, SOLUTION INTRAVENOUS at 14:43

## 2025-03-03 RX ADMIN — LACTULOSE 20 G: 10 SOLUTION ORAL at 20:06

## 2025-03-03 RX ADMIN — SODIUM CHLORIDE, PRESERVATIVE FREE 40 MG: 5 INJECTION INTRAVENOUS at 20:06

## 2025-03-03 RX ADMIN — LACTULOSE 20 G: 10 SOLUTION ORAL at 09:22

## 2025-03-03 RX ADMIN — CHLORHEXIDINE GLUCONATE, 0.12% ORAL RINSE 15 ML: 1.2 SOLUTION DENTAL at 20:06

## 2025-03-03 RX ADMIN — MIDAZOLAM HYDROCHLORIDE 4 MG: 1 INJECTION, SOLUTION INTRAMUSCULAR; INTRAVENOUS at 17:36

## 2025-03-03 RX ADMIN — SODIUM CHLORIDE, PRESERVATIVE FREE 10 ML: 5 INJECTION INTRAVENOUS at 20:07

## 2025-03-03 RX ADMIN — SODIUM CHLORIDE, PRESERVATIVE FREE 40 MG: 5 INJECTION INTRAVENOUS at 09:22

## 2025-03-03 RX ADMIN — LACTULOSE 20 G: 10 SOLUTION ORAL at 14:40

## 2025-03-03 RX ADMIN — IPRATROPIUM BROMIDE AND ALBUTEROL SULFATE 1 DOSE: 2.5; .5 SOLUTION RESPIRATORY (INHALATION) at 16:09

## 2025-03-03 RX ADMIN — Medication 4 ML: at 20:13

## 2025-03-03 RX ADMIN — ARFORMOTEROL TARTRATE 15 MCG: 15 SOLUTION RESPIRATORY (INHALATION) at 08:19

## 2025-03-03 RX ADMIN — RIFAXIMIN 400 MG: 200 TABLET ORAL at 09:22

## 2025-03-03 RX ADMIN — ARFORMOTEROL TARTRATE 15 MCG: 15 SOLUTION RESPIRATORY (INHALATION) at 20:13

## 2025-03-03 RX ADMIN — WARFARIN SODIUM 1 MG: 1 TABLET ORAL at 17:26

## 2025-03-03 RX ADMIN — IPRATROPIUM BROMIDE AND ALBUTEROL SULFATE 1 DOSE: 2.5; .5 SOLUTION RESPIRATORY (INHALATION) at 08:19

## 2025-03-03 RX ADMIN — LORAZEPAM 2 MG: 2 INJECTION INTRAMUSCULAR; INTRAVENOUS at 09:23

## 2025-03-03 RX ADMIN — MIDAZOLAM HYDROCHLORIDE 4 MG: 2 INJECTION, SOLUTION INTRAMUSCULAR; INTRAVENOUS at 17:36

## 2025-03-03 RX ADMIN — POLYVINYL ALCOHOL, POVIDONE 1 DROP: 14; 6 SOLUTION/ DROPS OPHTHALMIC at 02:16

## 2025-03-03 RX ADMIN — SODIUM CHLORIDE, PRESERVATIVE FREE 10 ML: 5 INJECTION INTRAVENOUS at 07:25

## 2025-03-03 RX ADMIN — POLYVINYL ALCOHOL, POVIDONE 1 DROP: 14; 6 SOLUTION/ DROPS OPHTHALMIC at 20:06

## 2025-03-03 RX ADMIN — BUDESONIDE INHALATION 500 MCG: 0.5 SUSPENSION RESPIRATORY (INHALATION) at 20:13

## 2025-03-03 RX ADMIN — RIFAXIMIN 400 MG: 200 TABLET ORAL at 14:40

## 2025-03-03 RX ADMIN — CHLORHEXIDINE GLUCONATE, 0.12% ORAL RINSE 15 ML: 1.2 SOLUTION DENTAL at 07:25

## 2025-03-03 RX ADMIN — Medication 400 MG: at 09:22

## 2025-03-03 RX ADMIN — IPRATROPIUM BROMIDE AND ALBUTEROL SULFATE 1 DOSE: 2.5; .5 SOLUTION RESPIRATORY (INHALATION) at 12:06

## 2025-03-03 RX ADMIN — HEPARIN SODIUM 13 UNITS/KG/HR: 10000 INJECTION, SOLUTION INTRAVENOUS at 07:58

## 2025-03-03 RX ADMIN — MINERAL OIL, WHITE PETROLATUM: .03; .94 OINTMENT OPHTHALMIC at 07:25

## 2025-03-03 RX ADMIN — POLYVINYL ALCOHOL, POVIDONE 1 DROP: 14; 6 SOLUTION/ DROPS OPHTHALMIC at 04:32

## 2025-03-03 RX ADMIN — LORAZEPAM 2 MG: 2 INJECTION INTRAMUSCULAR; INTRAVENOUS at 21:52

## 2025-03-03 RX ADMIN — BUMETANIDE 2 MG: 0.25 INJECTION INTRAMUSCULAR; INTRAVENOUS at 17:26

## 2025-03-03 RX ADMIN — IPRATROPIUM BROMIDE AND ALBUTEROL SULFATE 1 DOSE: 2.5; .5 SOLUTION RESPIRATORY (INHALATION) at 20:13

## 2025-03-03 RX ADMIN — Medication 4 ML: at 08:20

## 2025-03-03 ASSESSMENT — PULMONARY FUNCTION TESTS
PIF_VALUE: 27
PIF_VALUE: 29
PIF_VALUE: 26
PIF_VALUE: 26
PIF_VALUE: 27
PIF_VALUE: 28
PIF_VALUE: 27
PIF_VALUE: 30
PIF_VALUE: 27
PIF_VALUE: 30
PIF_VALUE: 27
PIF_VALUE: 26
PIF_VALUE: 28
PIF_VALUE: 29
PIF_VALUE: 29
PIF_VALUE: 27
PIF_VALUE: 36
PIF_VALUE: 29
PIF_VALUE: 28
PIF_VALUE: 26
PIF_VALUE: 28
PIF_VALUE: 30
PIF_VALUE: 29
PIF_VALUE: 27
PIF_VALUE: 28
PIF_VALUE: 27
PIF_VALUE: 26

## 2025-03-03 ASSESSMENT — PAIN SCALES - GENERAL
PAINLEVEL_OUTOF10: 0

## 2025-03-04 ENCOUNTER — APPOINTMENT (OUTPATIENT)
Dept: GENERAL RADIOLOGY | Age: 60
DRG: 207 | End: 2025-03-04
Payer: MEDICARE

## 2025-03-04 LAB
AADO2: 137.8 MMHG
ALBUMIN SERPL-MCNC: 3 G/DL (ref 3.5–5.2)
ALP SERPL-CCNC: 219 U/L (ref 40–129)
ALT SERPL-CCNC: 18 U/L (ref 0–40)
ANION GAP SERPL CALCULATED.3IONS-SCNC: 13 MMOL/L (ref 7–16)
AST SERPL-CCNC: 54 U/L (ref 0–39)
B.E.: -0.8 MMOL/L (ref -3–3)
BACTERIA URNS QL MICRO: ABNORMAL
BASOPHILS # BLD: 0.05 K/UL (ref 0–0.2)
BASOPHILS NFR BLD: 1 % (ref 0–2)
BILIRUB SERPL-MCNC: 1.7 MG/DL (ref 0–1.2)
BILIRUB UR QL STRIP: NEGATIVE
BUN SERPL-MCNC: 33 MG/DL (ref 6–20)
CALCIUM SERPL-MCNC: 8.8 MG/DL (ref 8.6–10.2)
CHLORIDE SERPL-SCNC: 118 MMOL/L (ref 98–107)
CLARITY UR: CLEAR
CO2 SERPL-SCNC: 22 MMOL/L (ref 22–29)
COHB: 1.3 % (ref 0–1.5)
COLOR UR: YELLOW
CREAT SERPL-MCNC: 2.5 MG/DL (ref 0.7–1.2)
CRITICAL: ABNORMAL
DATE ANALYZED: ABNORMAL
DATE OF COLLECTION: ABNORMAL
EOSINOPHIL # BLD: 0.18 K/UL (ref 0.05–0.5)
EOSINOPHILS RELATIVE PERCENT: 2 % (ref 0–6)
ERYTHROCYTE [DISTWIDTH] IN BLOOD BY AUTOMATED COUNT: 22 % (ref 11.5–15)
FIO2: 35 %
GFR, ESTIMATED: 29 ML/MIN/1.73M2
GLUCOSE SERPL-MCNC: 106 MG/DL (ref 74–99)
GLUCOSE UR STRIP-MCNC: NEGATIVE MG/DL
HCO3: 22.2 MMOL/L (ref 22–26)
HCT VFR BLD AUTO: 28 % (ref 37–54)
HGB BLD-MCNC: 8.2 G/DL (ref 12.5–16.5)
HGB UR QL STRIP.AUTO: ABNORMAL
HHB: 4.8 % (ref 0–5)
HIV 1+2 AB+HIV1 P24 AG SERPL QL IA: NONREACTIVE
IMM GRANULOCYTES # BLD AUTO: 0.07 K/UL (ref 0–0.58)
IMM GRANULOCYTES NFR BLD: 1 % (ref 0–5)
INR PPP: 1.5
KETONES UR STRIP-MCNC: ABNORMAL MG/DL
LAB: ABNORMAL
LACTATE BLDV-SCNC: 0.9 MMOL/L (ref 0.5–2.2)
LEUKOCYTE ESTERASE UR QL STRIP: ABNORMAL
LYMPHOCYTES NFR BLD: 0.78 K/UL (ref 1.5–4)
LYMPHOCYTES RELATIVE PERCENT: 7 % (ref 20–42)
Lab: 420
MAGNESIUM SERPL-MCNC: 2.2 MG/DL (ref 1.6–2.6)
MCH RBC QN AUTO: 30.4 PG (ref 26–35)
MCHC RBC AUTO-ENTMCNC: 29.3 G/DL (ref 32–34.5)
MCV RBC AUTO: 103.7 FL (ref 80–99.9)
METHB: 0.6 % (ref 0–1.5)
MODE: AC
MONOCYTES NFR BLD: 1.05 K/UL (ref 0.1–0.95)
MONOCYTES NFR BLD: 10 % (ref 2–12)
NEUTROPHILS NFR BLD: 81 % (ref 43–80)
NEUTS SEG NFR BLD: 8.98 K/UL (ref 1.8–7.3)
NITRITE UR QL STRIP: NEGATIVE
O2 SATURATION: 95.1 % (ref 92–98.5)
O2HB: 93.3 % (ref 94–97)
OPERATOR ID: 2962
PARTIAL THROMBOPLASTIN TIME: 61.9 SEC (ref 24.5–35.1)
PATIENT TEMP: 37 C
PCO2: 30.5 MMHG (ref 35–45)
PEEP/CPAP: 8 CMH2O
PFO2: 2.18 MMHG/%
PH BLOOD GAS: 7.48 (ref 7.35–7.45)
PH UR STRIP: 6 [PH] (ref 5–8)
PHOSPHATE SERPL-MCNC: 3.6 MG/DL (ref 2.5–4.5)
PLATELET CONFIRMATION: NORMAL
PLATELET, FLUORESCENCE: 98 K/UL (ref 130–450)
PMV BLD AUTO: 14.5 FL (ref 7–12)
PO2: 76.3 MMHG (ref 75–100)
POTASSIUM SERPL-SCNC: 3.9 MMOL/L (ref 3.5–5)
PROCALCITONIN SERPL-MCNC: 0.35 NG/ML (ref 0–0.08)
PROT SERPL-MCNC: 6.8 G/DL (ref 6.4–8.3)
PROT UR STRIP-MCNC: 30 MG/DL
PROTHROMBIN TIME: 16.4 SEC (ref 9.3–12.4)
RBC # BLD AUTO: 2.7 M/UL (ref 3.8–5.8)
RBC # BLD: ABNORMAL 10*6/UL
RBC # BLD: ABNORMAL 10*6/UL
RBC #/AREA URNS HPF: ABNORMAL /HPF
RI(T): 1.81
RR MECHANICAL: 12 B/MIN
SODIUM SERPL-SCNC: 153 MMOL/L (ref 132–146)
SOURCE, BLOOD GAS: ABNORMAL
SP GR UR STRIP: 1.01 (ref 1–1.03)
THB: 9.8 G/DL (ref 11.5–16.5)
TIME ANALYZED: 432
UROBILINOGEN UR STRIP-ACNC: 0.2 EU/DL (ref 0–1)
VT MECHANICAL: 450 ML
WBC #/AREA URNS HPF: ABNORMAL /HPF
WBC OTHER # BLD: 11.1 K/UL (ref 4.5–11.5)

## 2025-03-04 PROCEDURE — 6360000002 HC RX W HCPCS

## 2025-03-04 PROCEDURE — 2580000003 HC RX 258: Performed by: INTERNAL MEDICINE

## 2025-03-04 PROCEDURE — 85025 COMPLETE CBC W/AUTO DIFF WBC: CPT

## 2025-03-04 PROCEDURE — 82805 BLOOD GASES W/O2 SATURATION: CPT

## 2025-03-04 PROCEDURE — 85730 THROMBOPLASTIN TIME PARTIAL: CPT

## 2025-03-04 PROCEDURE — 6370000000 HC RX 637 (ALT 250 FOR IP)

## 2025-03-04 PROCEDURE — 87081 CULTURE SCREEN ONLY: CPT

## 2025-03-04 PROCEDURE — 87070 CULTURE OTHR SPECIMN AEROBIC: CPT

## 2025-03-04 PROCEDURE — 36415 COLL VENOUS BLD VENIPUNCTURE: CPT

## 2025-03-04 PROCEDURE — 2580000003 HC RX 258

## 2025-03-04 PROCEDURE — 2500000003 HC RX 250 WO HCPCS

## 2025-03-04 PROCEDURE — 87077 CULTURE AEROBIC IDENTIFY: CPT

## 2025-03-04 PROCEDURE — 2500000003 HC RX 250 WO HCPCS: Performed by: FAMILY MEDICINE

## 2025-03-04 PROCEDURE — 87449 NOS EACH ORGANISM AG IA: CPT

## 2025-03-04 PROCEDURE — 2000000000 HC ICU R&B

## 2025-03-04 PROCEDURE — 87040 BLOOD CULTURE FOR BACTERIA: CPT

## 2025-03-04 PROCEDURE — 83735 ASSAY OF MAGNESIUM: CPT

## 2025-03-04 PROCEDURE — 83605 ASSAY OF LACTIC ACID: CPT

## 2025-03-04 PROCEDURE — 99291 CRITICAL CARE FIRST HOUR: CPT | Performed by: INTERNAL MEDICINE

## 2025-03-04 PROCEDURE — 6360000002 HC RX W HCPCS: Performed by: STUDENT IN AN ORGANIZED HEALTH CARE EDUCATION/TRAINING PROGRAM

## 2025-03-04 PROCEDURE — 87086 URINE CULTURE/COLONY COUNT: CPT

## 2025-03-04 PROCEDURE — 99232 SBSQ HOSP IP/OBS MODERATE 35: CPT | Performed by: STUDENT IN AN ORGANIZED HEALTH CARE EDUCATION/TRAINING PROGRAM

## 2025-03-04 PROCEDURE — 84100 ASSAY OF PHOSPHORUS: CPT

## 2025-03-04 PROCEDURE — 71045 X-RAY EXAM CHEST 1 VIEW: CPT

## 2025-03-04 PROCEDURE — 2580000003 HC RX 258: Performed by: STUDENT IN AN ORGANIZED HEALTH CARE EDUCATION/TRAINING PROGRAM

## 2025-03-04 PROCEDURE — 87205 SMEAR GRAM STAIN: CPT

## 2025-03-04 PROCEDURE — 84145 PROCALCITONIN (PCT): CPT

## 2025-03-04 PROCEDURE — 6370000000 HC RX 637 (ALT 250 FOR IP): Performed by: STUDENT IN AN ORGANIZED HEALTH CARE EDUCATION/TRAINING PROGRAM

## 2025-03-04 PROCEDURE — 94003 VENT MGMT INPAT SUBQ DAY: CPT

## 2025-03-04 PROCEDURE — 6370000000 HC RX 637 (ALT 250 FOR IP): Performed by: INTERNAL MEDICINE

## 2025-03-04 PROCEDURE — 6360000002 HC RX W HCPCS: Performed by: INTERNAL MEDICINE

## 2025-03-04 PROCEDURE — 87389 HIV-1 AG W/HIV-1&-2 AB AG IA: CPT

## 2025-03-04 PROCEDURE — 94640 AIRWAY INHALATION TREATMENT: CPT

## 2025-03-04 PROCEDURE — 80053 COMPREHEN METABOLIC PANEL: CPT

## 2025-03-04 PROCEDURE — 85610 PROTHROMBIN TIME: CPT

## 2025-03-04 PROCEDURE — 81001 URINALYSIS AUTO W/SCOPE: CPT

## 2025-03-04 RX ORDER — WARFARIN SODIUM 1 MG/1
1 TABLET ORAL DAILY
Status: DISCONTINUED | OUTPATIENT
Start: 2025-03-04 | End: 2025-03-08

## 2025-03-04 RX ORDER — POLYETHYLENE GLYCOL 3350 17 G/17G
17 POWDER, FOR SOLUTION ORAL DAILY PRN
Status: DISCONTINUED | OUTPATIENT
Start: 2025-03-04 | End: 2025-03-17 | Stop reason: HOSPADM

## 2025-03-04 RX ORDER — DEXTROSE MONOHYDRATE 50 MG/ML
INJECTION, SOLUTION INTRAVENOUS CONTINUOUS
Status: ACTIVE | OUTPATIENT
Start: 2025-03-04 | End: 2025-03-04

## 2025-03-04 RX ORDER — ACETAMINOPHEN 325 MG/1
650 TABLET ORAL EVERY 6 HOURS PRN
Status: DISCONTINUED | OUTPATIENT
Start: 2025-03-04 | End: 2025-03-17 | Stop reason: HOSPADM

## 2025-03-04 RX ORDER — LANOLIN ALCOHOL/MO/W.PET/CERES
400 CREAM (GRAM) TOPICAL DAILY
Status: DISCONTINUED | OUTPATIENT
Start: 2025-03-05 | End: 2025-03-17 | Stop reason: HOSPADM

## 2025-03-04 RX ORDER — MIDAZOLAM HYDROCHLORIDE 2 MG/2ML
2 INJECTION, SOLUTION INTRAMUSCULAR; INTRAVENOUS EVERY 4 HOURS PRN
Status: DISCONTINUED | OUTPATIENT
Start: 2025-03-04 | End: 2025-03-06

## 2025-03-04 RX ORDER — BUMETANIDE 0.25 MG/ML
2 INJECTION, SOLUTION INTRAMUSCULAR; INTRAVENOUS 2 TIMES DAILY
Status: DISCONTINUED | OUTPATIENT
Start: 2025-03-04 | End: 2025-03-10

## 2025-03-04 RX ORDER — LACTULOSE 10 G/15ML
20 SOLUTION ORAL 3 TIMES DAILY
Status: DISCONTINUED | OUTPATIENT
Start: 2025-03-04 | End: 2025-03-17 | Stop reason: HOSPADM

## 2025-03-04 RX ORDER — ACETAMINOPHEN 650 MG/1
650 SUPPOSITORY RECTAL EVERY 6 HOURS PRN
Status: DISCONTINUED | OUTPATIENT
Start: 2025-03-04 | End: 2025-03-17 | Stop reason: HOSPADM

## 2025-03-04 RX ADMIN — LACTULOSE 20 G: 10 SOLUTION ORAL at 14:47

## 2025-03-04 RX ADMIN — HEPARIN SODIUM 13 UNITS/KG/HR: 10000 INJECTION, SOLUTION INTRAVENOUS at 18:41

## 2025-03-04 RX ADMIN — SODIUM CHLORIDE, PRESERVATIVE FREE 40 MG: 5 INJECTION INTRAVENOUS at 07:42

## 2025-03-04 RX ADMIN — RIFAXIMIN 400 MG: 200 TABLET ORAL at 14:47

## 2025-03-04 RX ADMIN — BUDESONIDE INHALATION 500 MCG: 0.5 SUSPENSION RESPIRATORY (INHALATION) at 09:13

## 2025-03-04 RX ADMIN — CEFEPIME 2000 MG: 2 INJECTION, POWDER, FOR SOLUTION INTRAVENOUS at 23:24

## 2025-03-04 RX ADMIN — ARFORMOTEROL TARTRATE 15 MCG: 15 SOLUTION RESPIRATORY (INHALATION) at 09:13

## 2025-03-04 RX ADMIN — POLYVINYL ALCOHOL, POVIDONE 1 DROP: 14; 6 SOLUTION/ DROPS OPHTHALMIC at 19:39

## 2025-03-04 RX ADMIN — RIFAXIMIN 400 MG: 200 TABLET ORAL at 19:38

## 2025-03-04 RX ADMIN — IPRATROPIUM BROMIDE AND ALBUTEROL SULFATE 1 DOSE: 2.5; .5 SOLUTION RESPIRATORY (INHALATION) at 09:13

## 2025-03-04 RX ADMIN — WARFARIN SODIUM 1 MG: 1 TABLET ORAL at 16:47

## 2025-03-04 RX ADMIN — LORAZEPAM 2 MG: 2 INJECTION INTRAMUSCULAR; INTRAVENOUS at 08:00

## 2025-03-04 RX ADMIN — MINERAL OIL, WHITE PETROLATUM: .03; .94 OINTMENT OPHTHALMIC at 12:05

## 2025-03-04 RX ADMIN — SODIUM CHLORIDE, PRESERVATIVE FREE 10 ML: 5 INJECTION INTRAVENOUS at 07:44

## 2025-03-04 RX ADMIN — BUMETANIDE 2 MG: 0.25 INJECTION INTRAMUSCULAR; INTRAVENOUS at 16:47

## 2025-03-04 RX ADMIN — LACTULOSE 20 G: 10 SOLUTION ORAL at 07:42

## 2025-03-04 RX ADMIN — DEXTROSE MONOHYDRATE: 50 INJECTION, SOLUTION INTRAVENOUS at 08:50

## 2025-03-04 RX ADMIN — MINERAL OIL, WHITE PETROLATUM: .03; .94 OINTMENT OPHTHALMIC at 14:48

## 2025-03-04 RX ADMIN — Medication 4 ML: at 22:16

## 2025-03-04 RX ADMIN — SODIUM CHLORIDE, PRESERVATIVE FREE 10 ML: 5 INJECTION INTRAVENOUS at 19:38

## 2025-03-04 RX ADMIN — ACETAMINOPHEN 650 MG: 325 TABLET ORAL at 09:10

## 2025-03-04 RX ADMIN — RIFAXIMIN 400 MG: 200 TABLET ORAL at 07:43

## 2025-03-04 RX ADMIN — HEPARIN SODIUM 13 UNITS/KG/HR: 10000 INJECTION, SOLUTION INTRAVENOUS at 01:28

## 2025-03-04 RX ADMIN — BUDESONIDE INHALATION 500 MCG: 0.5 SUSPENSION RESPIRATORY (INHALATION) at 22:15

## 2025-03-04 RX ADMIN — CHLORHEXIDINE GLUCONATE, 0.12% ORAL RINSE 15 ML: 1.2 SOLUTION DENTAL at 07:43

## 2025-03-04 RX ADMIN — MINERAL OIL, WHITE PETROLATUM: .03; .94 OINTMENT OPHTHALMIC at 07:44

## 2025-03-04 RX ADMIN — MIDAZOLAM HYDROCHLORIDE 2 MG: 1 INJECTION, SOLUTION INTRAMUSCULAR; INTRAVENOUS at 22:59

## 2025-03-04 RX ADMIN — Medication 4 ML: at 09:13

## 2025-03-04 RX ADMIN — VANCOMYCIN HYDROCHLORIDE 2000 MG: 500 INJECTION, POWDER, LYOPHILIZED, FOR SOLUTION INTRAVENOUS at 12:02

## 2025-03-04 RX ADMIN — LACTULOSE 20 G: 10 SOLUTION ORAL at 19:38

## 2025-03-04 RX ADMIN — THIAMINE HYDROCHLORIDE 100 MG: 100 INJECTION, SOLUTION INTRAMUSCULAR; INTRAVENOUS at 07:43

## 2025-03-04 RX ADMIN — IPRATROPIUM BROMIDE AND ALBUTEROL SULFATE 1 DOSE: 2.5; .5 SOLUTION RESPIRATORY (INHALATION) at 22:15

## 2025-03-04 RX ADMIN — SODIUM CHLORIDE, PRESERVATIVE FREE 10 ML: 5 INJECTION INTRAVENOUS at 07:45

## 2025-03-04 RX ADMIN — IPRATROPIUM BROMIDE AND ALBUTEROL SULFATE 1 DOSE: 2.5; .5 SOLUTION RESPIRATORY (INHALATION) at 12:07

## 2025-03-04 RX ADMIN — ARFORMOTEROL TARTRATE 15 MCG: 15 SOLUTION RESPIRATORY (INHALATION) at 22:15

## 2025-03-04 RX ADMIN — CEFEPIME 2000 MG: 2 INJECTION, POWDER, FOR SOLUTION INTRAVENOUS at 12:04

## 2025-03-04 RX ADMIN — POLYVINYL ALCOHOL, POVIDONE 1 DROP: 14; 6 SOLUTION/ DROPS OPHTHALMIC at 22:59

## 2025-03-04 RX ADMIN — ACETAMINOPHEN 650 MG: 325 TABLET ORAL at 19:39

## 2025-03-04 RX ADMIN — IPRATROPIUM BROMIDE AND ALBUTEROL SULFATE 1 DOSE: 2.5; .5 SOLUTION RESPIRATORY (INHALATION) at 16:49

## 2025-03-04 RX ADMIN — POTASSIUM BICARBONATE 20 MEQ: 782 TABLET, EFFERVESCENT ORAL at 07:43

## 2025-03-04 RX ADMIN — Medication 400 MG: at 07:43

## 2025-03-04 RX ADMIN — CHLORHEXIDINE GLUCONATE, 0.12% ORAL RINSE 15 ML: 1.2 SOLUTION DENTAL at 19:38

## 2025-03-04 ASSESSMENT — PULMONARY FUNCTION TESTS
PIF_VALUE: 31
PIF_VALUE: 28
PIF_VALUE: 39
PIF_VALUE: 27
PIF_VALUE: 26
PIF_VALUE: 28
PIF_VALUE: 29
PIF_VALUE: 34
PIF_VALUE: 19
PIF_VALUE: 19
PIF_VALUE: 28
PIF_VALUE: 30
PIF_VALUE: 29
PIF_VALUE: 28
PIF_VALUE: 35
PIF_VALUE: 29
PIF_VALUE: 27
PIF_VALUE: 31
PIF_VALUE: 30
PIF_VALUE: 28
PIF_VALUE: 22
PIF_VALUE: 26
PIF_VALUE: 27
PIF_VALUE: 29
PIF_VALUE: 20
PIF_VALUE: 29
PIF_VALUE: 27
PIF_VALUE: 31
PIF_VALUE: 30
PIF_VALUE: 27
PIF_VALUE: 35
PIF_VALUE: 29
PIF_VALUE: 34

## 2025-03-04 ASSESSMENT — PAIN DESCRIPTION - DESCRIPTORS: DESCRIPTORS: SQUEEZING;THROBBING;TIGHTNESS

## 2025-03-04 ASSESSMENT — PAIN SCALES - GENERAL
PAINLEVEL_OUTOF10: 0
PAINLEVEL_OUTOF10: 3
PAINLEVEL_OUTOF10: 0
PAINLEVEL_OUTOF10: 5
PAINLEVEL_OUTOF10: 3

## 2025-03-04 NOTE — PROCEDURES
[facility]  BRONCHOSCOPY PROCEDURE NOTE    Procedure Date: 3/4/2025    Pre-op Diagnosis: Pneumonia, Mucoid impaction     Post-op Diagnosis: Pneumonia, Mucoid impaction      Procedure:  Flexible Fiberoptic bronchoscopy     Attending: Dr. Rosa      Assistant: Dr. Sullivan     Specimen: Sent purulent fluid    Complications: None    Condition: Critical    Procedure:  At the bedside in the ICU, the patient was sedated with Versed and Precedex.  Heart rate, blood pressure, respiratory rate, and oxygen saturation were monitored.  The bronchoscope was inserted via the endotracheal tube.  First the right main stem and segmental bronchi were viewed.  The scope was slowly withdrawn and passed into the left mainstem and segmental bronchi.  The bronchoscope was completely withdrawn.  The patient tolerated the procedure well with no readily apparent complications.      Taiwo Sullivan MD    [facility]  Department of Pulmonary, Critical Care and Sleep Medicine  [facility]  Department of Internal Medicine  Attending Procedural Note Addendum Statement    This procedure was supervised. The critical elements of this procedure was monitored and, if needed, I was available for the needs of the procedure.     Nikhil Rosa, DO, FCCP, FACOI, FACP

## 2025-03-05 ENCOUNTER — APPOINTMENT (OUTPATIENT)
Dept: GENERAL RADIOLOGY | Age: 60
DRG: 207 | End: 2025-03-05
Payer: MEDICARE

## 2025-03-05 ENCOUNTER — HOSPITAL ENCOUNTER (OUTPATIENT)
Dept: OTHER | Age: 60
Discharge: HOME | End: 2025-03-05

## 2025-03-05 LAB
AADO2: 282.9 MMHG
ALBUMIN SERPL-MCNC: 3.1 G/DL (ref 3.5–5.2)
ALP SERPL-CCNC: 191 U/L (ref 40–129)
ALT SERPL-CCNC: 23 U/L (ref 0–40)
ANION GAP SERPL CALCULATED.3IONS-SCNC: 13 MMOL/L (ref 7–16)
AST SERPL-CCNC: 62 U/L (ref 0–39)
B.E.: -0.9 MMOL/L (ref -3–3)
BASOPHILS # BLD: 0.15 K/UL (ref 0–0.2)
BASOPHILS NFR BLD: 2 % (ref 0–2)
BILIRUB SERPL-MCNC: 1.7 MG/DL (ref 0–1.2)
BUN SERPL-MCNC: 31 MG/DL (ref 6–20)
CALCIUM SERPL-MCNC: 8.7 MG/DL (ref 8.6–10.2)
CHLORIDE SERPL-SCNC: 122 MMOL/L (ref 98–107)
CO2 SERPL-SCNC: 22 MMOL/L (ref 22–29)
COHB: 1 % (ref 0–1.5)
CREAT SERPL-MCNC: 2.5 MG/DL (ref 0.7–1.2)
CRITICAL: ABNORMAL
DATE ANALYZED: ABNORMAL
DATE OF COLLECTION: ABNORMAL
EOSINOPHIL # BLD: 0.07 K/UL (ref 0.05–0.5)
EOSINOPHILS RELATIVE PERCENT: 1 % (ref 0–6)
ERYTHROCYTE [DISTWIDTH] IN BLOOD BY AUTOMATED COUNT: 21.2 % (ref 11.5–15)
FIO2: 55 %
GFR, ESTIMATED: 29 ML/MIN/1.73M2
GLUCOSE BLD-MCNC: 99 MG/DL (ref 74–99)
GLUCOSE SERPL-MCNC: 103 MG/DL (ref 74–99)
HCO3: 22.6 MMOL/L (ref 22–26)
HCT VFR BLD AUTO: 26.6 % (ref 37–54)
HGB BLD-MCNC: 7.8 G/DL (ref 12.5–16.5)
HHB: 7 % (ref 0–5)
INR PPP: 1.7
LAB: ABNORMAL
LYMPHOCYTES NFR BLD: 0.6 K/UL (ref 1.5–4)
LYMPHOCYTES RELATIVE PERCENT: 7 % (ref 20–42)
Lab: 504
MAGNESIUM SERPL-MCNC: 2.1 MG/DL (ref 1.6–2.6)
MCH RBC QN AUTO: 30.4 PG (ref 26–35)
MCHC RBC AUTO-ENTMCNC: 29.3 G/DL (ref 32–34.5)
MCV RBC AUTO: 103.5 FL (ref 80–99.9)
METHB: 0.5 % (ref 0–1.5)
MICROORGANISM SPEC CULT: NO GROWTH
MICROORGANISM SPEC CULT: NORMAL
MODE: AC
MONOCYTES NFR BLD: 0.67 K/UL (ref 0.1–0.95)
MONOCYTES NFR BLD: 8 % (ref 2–12)
NEUTROPHILS NFR BLD: 83 % (ref 43–80)
NEUTS SEG NFR BLD: 7.1 K/UL (ref 1.8–7.3)
O2 SATURATION: 92.9 % (ref 92–98.5)
O2HB: 91.5 % (ref 94–97)
OPERATOR ID: 3342
PARTIAL THROMBOPLASTIN TIME: 68.1 SEC (ref 24.5–35.1)
PATIENT TEMP: 37 C
PCO2: 33.7 MMHG (ref 35–45)
PEEP/CPAP: 8 CMH2O
PFO2: 1.31 MMHG/%
PH BLOOD GAS: 7.44 (ref 7.35–7.45)
PHOSPHATE SERPL-MCNC: 3.2 MG/DL (ref 2.5–4.5)
PLATELET CONFIRMATION: NORMAL
PLATELET, FLUORESCENCE: 99 K/UL (ref 130–450)
PMV BLD AUTO: 14.1 FL (ref 7–12)
PO2: 71.8 MMHG (ref 75–100)
POTASSIUM SERPL-SCNC: 3.3 MMOL/L (ref 3.5–5)
PROT SERPL-MCNC: 6.8 G/DL (ref 6.4–8.3)
PROTHROMBIN TIME: 18.1 SEC (ref 9.3–12.4)
RBC # BLD AUTO: 2.57 M/UL (ref 3.8–5.8)
RBC # BLD: ABNORMAL 10*6/UL
RI(T): 3.94
RR MECHANICAL: 12 B/MIN
SODIUM SERPL-SCNC: 157 MMOL/L (ref 132–146)
SOURCE, BLOOD GAS: ABNORMAL
SPECIMEN DESCRIPTION: NORMAL
SPECIMEN DESCRIPTION: NORMAL
SURGICAL PATHOLOGY REPORT: NORMAL
THB: 11.9 G/DL (ref 11.5–16.5)
TIME ANALYZED: 507
VT MECHANICAL: 450 ML
WBC OTHER # BLD: 8.6 K/UL (ref 4.5–11.5)

## 2025-03-05 PROCEDURE — 85025 COMPLETE CBC W/AUTO DIFF WBC: CPT

## 2025-03-05 PROCEDURE — 94003 VENT MGMT INPAT SUBQ DAY: CPT

## 2025-03-05 PROCEDURE — 2580000003 HC RX 258

## 2025-03-05 PROCEDURE — 80053 COMPREHEN METABOLIC PANEL: CPT

## 2025-03-05 PROCEDURE — 84100 ASSAY OF PHOSPHORUS: CPT

## 2025-03-05 PROCEDURE — 82805 BLOOD GASES W/O2 SATURATION: CPT

## 2025-03-05 PROCEDURE — 94640 AIRWAY INHALATION TREATMENT: CPT

## 2025-03-05 PROCEDURE — 99232 SBSQ HOSP IP/OBS MODERATE 35: CPT | Performed by: STUDENT IN AN ORGANIZED HEALTH CARE EDUCATION/TRAINING PROGRAM

## 2025-03-05 PROCEDURE — 6370000000 HC RX 637 (ALT 250 FOR IP)

## 2025-03-05 PROCEDURE — 6360000002 HC RX W HCPCS

## 2025-03-05 PROCEDURE — 2700000000 HC OXYGEN THERAPY PER DAY

## 2025-03-05 PROCEDURE — 2500000003 HC RX 250 WO HCPCS

## 2025-03-05 PROCEDURE — 6370000000 HC RX 637 (ALT 250 FOR IP): Performed by: INTERNAL MEDICINE

## 2025-03-05 PROCEDURE — 94799 UNLISTED PULMONARY SVC/PX: CPT

## 2025-03-05 PROCEDURE — 2580000003 HC RX 258: Performed by: INTERNAL MEDICINE

## 2025-03-05 PROCEDURE — 82962 GLUCOSE BLOOD TEST: CPT

## 2025-03-05 PROCEDURE — 2000000000 HC ICU R&B

## 2025-03-05 PROCEDURE — 99291 CRITICAL CARE FIRST HOUR: CPT | Performed by: INTERNAL MEDICINE

## 2025-03-05 PROCEDURE — 71045 X-RAY EXAM CHEST 1 VIEW: CPT

## 2025-03-05 PROCEDURE — 85610 PROTHROMBIN TIME: CPT

## 2025-03-05 PROCEDURE — 85730 THROMBOPLASTIN TIME PARTIAL: CPT

## 2025-03-05 PROCEDURE — 83735 ASSAY OF MAGNESIUM: CPT

## 2025-03-05 PROCEDURE — 6360000002 HC RX W HCPCS: Performed by: INTERNAL MEDICINE

## 2025-03-05 RX ADMIN — BUDESONIDE INHALATION 500 MCG: 0.5 SUSPENSION RESPIRATORY (INHALATION) at 20:47

## 2025-03-05 RX ADMIN — THIAMINE HYDROCHLORIDE 100 MG: 100 INJECTION, SOLUTION INTRAMUSCULAR; INTRAVENOUS at 07:49

## 2025-03-05 RX ADMIN — RIFAXIMIN 400 MG: 200 TABLET ORAL at 21:16

## 2025-03-05 RX ADMIN — IPRATROPIUM BROMIDE AND ALBUTEROL SULFATE 1 DOSE: 2.5; .5 SOLUTION RESPIRATORY (INHALATION) at 17:21

## 2025-03-05 RX ADMIN — MINERAL OIL, WHITE PETROLATUM 1 APPLICATOR: .03; .94 OINTMENT OPHTHALMIC at 12:36

## 2025-03-05 RX ADMIN — IPRATROPIUM BROMIDE AND ALBUTEROL SULFATE 1 DOSE: 2.5; .5 SOLUTION RESPIRATORY (INHALATION) at 12:16

## 2025-03-05 RX ADMIN — ARFORMOTEROL TARTRATE 15 MCG: 15 SOLUTION RESPIRATORY (INHALATION) at 08:53

## 2025-03-05 RX ADMIN — HEPARIN SODIUM 13 UNITS/KG/HR: 10000 INJECTION, SOLUTION INTRAVENOUS at 12:40

## 2025-03-05 RX ADMIN — LACTULOSE 20 G: 10 SOLUTION ORAL at 12:41

## 2025-03-05 RX ADMIN — SODIUM CHLORIDE, PRESERVATIVE FREE 10 ML: 5 INJECTION INTRAVENOUS at 07:48

## 2025-03-05 RX ADMIN — PANTOPRAZOLE SODIUM 40 MG: 40 INJECTION, POWDER, FOR SOLUTION INTRAVENOUS at 07:47

## 2025-03-05 RX ADMIN — MINERAL OIL, WHITE PETROLATUM 1 APPLICATOR: .03; .94 OINTMENT OPHTHALMIC at 07:48

## 2025-03-05 RX ADMIN — RIFAXIMIN 400 MG: 200 TABLET ORAL at 07:45

## 2025-03-05 RX ADMIN — BUDESONIDE INHALATION 500 MCG: 0.5 SUSPENSION RESPIRATORY (INHALATION) at 08:51

## 2025-03-05 RX ADMIN — MINERAL OIL, WHITE PETROLATUM: .03; .94 OINTMENT OPHTHALMIC at 07:47

## 2025-03-05 RX ADMIN — IPRATROPIUM BROMIDE AND ALBUTEROL SULFATE 1 DOSE: 2.5; .5 SOLUTION RESPIRATORY (INHALATION) at 08:51

## 2025-03-05 RX ADMIN — CEFEPIME 2000 MG: 2 INJECTION, POWDER, FOR SOLUTION INTRAVENOUS at 23:24

## 2025-03-05 RX ADMIN — ARFORMOTEROL TARTRATE 15 MCG: 15 SOLUTION RESPIRATORY (INHALATION) at 20:47

## 2025-03-05 RX ADMIN — RIFAXIMIN 400 MG: 200 TABLET ORAL at 12:41

## 2025-03-05 RX ADMIN — POTASSIUM BICARBONATE 40 MEQ: 782 TABLET, EFFERVESCENT ORAL at 07:45

## 2025-03-05 RX ADMIN — BUMETANIDE 2 MG: 0.25 INJECTION INTRAMUSCULAR; INTRAVENOUS at 16:40

## 2025-03-05 RX ADMIN — WARFARIN SODIUM 1 MG: 1 TABLET ORAL at 16:40

## 2025-03-05 RX ADMIN — IPRATROPIUM BROMIDE AND ALBUTEROL SULFATE 1 DOSE: 2.5; .5 SOLUTION RESPIRATORY (INHALATION) at 20:47

## 2025-03-05 RX ADMIN — LACTULOSE 20 G: 10 SOLUTION ORAL at 07:46

## 2025-03-05 RX ADMIN — Medication 4 ML: at 08:53

## 2025-03-05 RX ADMIN — Medication 400 MG: at 07:47

## 2025-03-05 RX ADMIN — CHLORHEXIDINE GLUCONATE, 0.12% ORAL RINSE 15 ML: 1.2 SOLUTION DENTAL at 07:45

## 2025-03-05 RX ADMIN — BUMETANIDE 2 MG: 0.25 INJECTION INTRAMUSCULAR; INTRAVENOUS at 07:46

## 2025-03-05 RX ADMIN — IPRATROPIUM BROMIDE AND ALBUTEROL SULFATE 1 DOSE: 2.5; .5 SOLUTION RESPIRATORY (INHALATION) at 23:48

## 2025-03-05 RX ADMIN — CEFEPIME 2000 MG: 2 INJECTION, POWDER, FOR SOLUTION INTRAVENOUS at 10:55

## 2025-03-05 RX ADMIN — Medication 4 ML: at 20:50

## 2025-03-05 RX ADMIN — POTASSIUM BICARBONATE 40 MEQ: 782 TABLET, EFFERVESCENT ORAL at 07:00

## 2025-03-05 RX ADMIN — SODIUM CHLORIDE, PRESERVATIVE FREE 10 ML: 5 INJECTION INTRAVENOUS at 21:19

## 2025-03-05 ASSESSMENT — PULMONARY FUNCTION TESTS
PIF_VALUE: 27
PIF_VALUE: 32
PIF_VALUE: 26
PIF_VALUE: 19
PIF_VALUE: 28
PIF_VALUE: 24
PIF_VALUE: 27
PIF_VALUE: 25
PIF_VALUE: 28

## 2025-03-05 ASSESSMENT — PAIN DESCRIPTION - LOCATION: LOCATION: OTHER (COMMENT)

## 2025-03-05 ASSESSMENT — PAIN SCALES - GENERAL
PAINLEVEL_OUTOF10: 0

## 2025-03-05 ASSESSMENT — PAIN - FUNCTIONAL ASSESSMENT: PAIN_FUNCTIONAL_ASSESSMENT: ACTIVITIES ARE NOT PREVENTED

## 2025-03-05 ASSESSMENT — PAIN DESCRIPTION - DESCRIPTORS: DESCRIPTORS: SQUEEZING;THROBBING

## 2025-03-05 NOTE — FLOWSHEET NOTE
Attempts to remove medical devices.  
Attempts to remove medical devices/ oob. Unable to redirect.  
Continues to pull at ett device when released from restraint for repositioning. Continues to throw legs over siderails despite redirection. 2 point bilateral soft wrist restraints and 2 point bilateral soft ankle restraints continued.  
Patient continues to reach for lines/tubes and throw legs out of bed when released from soft restraints x4.  
Patient continues to reach for lines/tubes when released from R  
Pt reaches for life saving tubes and lines and kicks his legs over the side rales despite verbal redirection. Restraints continued at this time for pt's safety.  
When unrestrained patient pulls at lines and tubes. Verbal redirection attempted multiple times, but has been unsuccessful. Bilateral soft wrist restraints and bilateral soft ankle restraints continued to maintain the safety of the patient.   
yes

## 2025-03-06 ENCOUNTER — APPOINTMENT (OUTPATIENT)
Dept: ULTRASOUND IMAGING | Age: 60
DRG: 207 | End: 2025-03-06
Payer: MEDICARE

## 2025-03-06 LAB
ALBUMIN SERPL-MCNC: 3.3 G/DL (ref 3.5–5.2)
ALP SERPL-CCNC: 208 U/L (ref 40–129)
ALT SERPL-CCNC: 32 U/L (ref 0–40)
ANION GAP SERPL CALCULATED.3IONS-SCNC: 14 MMOL/L (ref 7–16)
ANION GAP SERPL CALCULATED.3IONS-SCNC: 16 MMOL/L (ref 7–16)
AST SERPL-CCNC: 68 U/L (ref 0–39)
B.E.: -2.5 MMOL/L (ref -3–3)
BASOPHILS # BLD: 0.28 K/UL (ref 0–0.2)
BASOPHILS NFR BLD: 4 % (ref 0–2)
BILIRUB SERPL-MCNC: 1.8 MG/DL (ref 0–1.2)
BUN SERPL-MCNC: 28 MG/DL (ref 6–20)
BUN SERPL-MCNC: 29 MG/DL (ref 6–20)
CALCIUM SERPL-MCNC: 8.7 MG/DL (ref 8.6–10.2)
CALCIUM SERPL-MCNC: 8.8 MG/DL (ref 8.6–10.2)
CHLORIDE SERPL-SCNC: 120 MMOL/L (ref 98–107)
CHLORIDE SERPL-SCNC: 122 MMOL/L (ref 98–107)
CO2 SERPL-SCNC: 19 MMOL/L (ref 22–29)
CO2 SERPL-SCNC: 20 MMOL/L (ref 22–29)
COHB: 0.2 % (ref 0–1.5)
CREAT SERPL-MCNC: 2.1 MG/DL (ref 0.7–1.2)
CREAT SERPL-MCNC: 2.2 MG/DL (ref 0.7–1.2)
CRITICAL: ABNORMAL
DATE ANALYZED: ABNORMAL
DATE OF COLLECTION: ABNORMAL
EOSINOPHIL # BLD: 0.49 K/UL (ref 0.05–0.5)
EOSINOPHILS RELATIVE PERCENT: 6 % (ref 0–6)
ERYTHROCYTE [DISTWIDTH] IN BLOOD BY AUTOMATED COUNT: 21.2 % (ref 11.5–15)
GFR, ESTIMATED: 33 ML/MIN/1.73M2
GFR, ESTIMATED: 36 ML/MIN/1.73M2
GLUCOSE SERPL-MCNC: 114 MG/DL (ref 74–99)
GLUCOSE SERPL-MCNC: 97 MG/DL (ref 74–99)
HCO3: 21.7 MMOL/L (ref 22–26)
HCT VFR BLD AUTO: 27.6 % (ref 37–54)
HGB BLD-MCNC: 8 G/DL (ref 12.5–16.5)
HHB: 4.5 % (ref 0–5)
INR PPP: 1.6
LAB: ABNORMAL
LYMPHOCYTES NFR BLD: 0.63 K/UL (ref 1.5–4)
LYMPHOCYTES RELATIVE PERCENT: 8 % (ref 20–42)
Lab: 523
MAGNESIUM SERPL-MCNC: 2.3 MG/DL (ref 1.6–2.6)
MCH RBC QN AUTO: 30.7 PG (ref 26–35)
MCHC RBC AUTO-ENTMCNC: 29 G/DL (ref 32–34.5)
MCV RBC AUTO: 105.7 FL (ref 80–99.9)
METAMYELOCYTES ABSOLUTE COUNT: 0.14 K/UL (ref 0–0.12)
METAMYELOCYTES: 2 % (ref 0–1)
METHB: 0.6 % (ref 0–1.5)
MICROORGANISM SPEC CULT: ABNORMAL
MICROORGANISM/AGENT SPEC: ABNORMAL
MODE: ABNORMAL
MONOCYTES NFR BLD: 0 % (ref 2–12)
MONOCYTES NFR BLD: 0 K/UL (ref 0.1–0.95)
NEUTROPHILS NFR BLD: 81 % (ref 43–80)
NEUTS SEG NFR BLD: 6.47 K/UL (ref 1.8–7.3)
O2 SATURATION: 95.5 % (ref 92–98.5)
O2HB: 94.7 % (ref 94–97)
OPERATOR ID: 2593
PARTIAL THROMBOPLASTIN TIME: 53.9 SEC (ref 24.5–35.1)
PATIENT TEMP: 37 C
PCO2: 35.1 MMHG (ref 35–45)
PH BLOOD GAS: 7.41 (ref 7.35–7.45)
PHOSPHATE SERPL-MCNC: 3.3 MG/DL (ref 2.5–4.5)
PLATELET CONFIRMATION: NORMAL
PLATELET, FLUORESCENCE: 93 K/UL (ref 130–450)
PMV BLD AUTO: 13.5 FL (ref 7–12)
PO2: 84.8 MMHG (ref 75–100)
POTASSIUM SERPL-SCNC: 3.6 MMOL/L (ref 3.5–5)
POTASSIUM SERPL-SCNC: 3.6 MMOL/L (ref 3.5–5)
PROT SERPL-MCNC: 6.8 G/DL (ref 6.4–8.3)
PROTHROMBIN TIME: 17.6 SEC (ref 9.3–12.4)
RBC # BLD AUTO: 2.61 M/UL (ref 3.8–5.8)
RBC # BLD: ABNORMAL 10*6/UL
RBC # BLD: ABNORMAL 10*6/UL
SODIUM SERPL-SCNC: 155 MMOL/L (ref 132–146)
SODIUM SERPL-SCNC: 156 MMOL/L (ref 132–146)
SOURCE, BLOOD GAS: ABNORMAL
SPECIMEN DESCRIPTION: ABNORMAL
SPECIMEN DESCRIPTION: ABNORMAL
THB: 9.3 G/DL (ref 11.5–16.5)
TIME ANALYZED: 527
WBC OTHER # BLD: 8 K/UL (ref 4.5–11.5)

## 2025-03-06 PROCEDURE — 82805 BLOOD GASES W/O2 SATURATION: CPT

## 2025-03-06 PROCEDURE — 6360000002 HC RX W HCPCS

## 2025-03-06 PROCEDURE — 84100 ASSAY OF PHOSPHORUS: CPT

## 2025-03-06 PROCEDURE — 85025 COMPLETE CBC W/AUTO DIFF WBC: CPT

## 2025-03-06 PROCEDURE — 92610 EVALUATE SWALLOWING FUNCTION: CPT

## 2025-03-06 PROCEDURE — 2500000003 HC RX 250 WO HCPCS

## 2025-03-06 PROCEDURE — 80048 BASIC METABOLIC PNL TOTAL CA: CPT

## 2025-03-06 PROCEDURE — 94640 AIRWAY INHALATION TREATMENT: CPT

## 2025-03-06 PROCEDURE — 99291 CRITICAL CARE FIRST HOUR: CPT | Performed by: INTERNAL MEDICINE

## 2025-03-06 PROCEDURE — 85730 THROMBOPLASTIN TIME PARTIAL: CPT

## 2025-03-06 PROCEDURE — 93971 EXTREMITY STUDY: CPT

## 2025-03-06 PROCEDURE — 2580000003 HC RX 258

## 2025-03-06 PROCEDURE — 6370000000 HC RX 637 (ALT 250 FOR IP)

## 2025-03-06 PROCEDURE — 2060000000 HC ICU INTERMEDIATE R&B

## 2025-03-06 PROCEDURE — 80053 COMPREHEN METABOLIC PANEL: CPT

## 2025-03-06 PROCEDURE — 2700000000 HC OXYGEN THERAPY PER DAY

## 2025-03-06 PROCEDURE — 36415 COLL VENOUS BLD VENIPUNCTURE: CPT

## 2025-03-06 PROCEDURE — 83735 ASSAY OF MAGNESIUM: CPT

## 2025-03-06 PROCEDURE — 85610 PROTHROMBIN TIME: CPT

## 2025-03-06 PROCEDURE — 99233 SBSQ HOSP IP/OBS HIGH 50: CPT

## 2025-03-06 RX ORDER — DEXTROSE MONOHYDRATE 50 MG/ML
INJECTION, SOLUTION INTRAVENOUS CONTINUOUS
Status: DISCONTINUED | OUTPATIENT
Start: 2025-03-06 | End: 2025-03-07

## 2025-03-06 RX ORDER — POTASSIUM CHLORIDE 7.45 MG/ML
10 INJECTION INTRAVENOUS
Status: COMPLETED | OUTPATIENT
Start: 2025-03-06 | End: 2025-03-06

## 2025-03-06 RX ORDER — MULTIVITAMIN WITH IRON
1 TABLET ORAL DAILY
Status: DISCONTINUED | OUTPATIENT
Start: 2025-03-06 | End: 2025-03-17 | Stop reason: HOSPADM

## 2025-03-06 RX ORDER — FOLIC ACID 5 MG/ML
1 INJECTION, SOLUTION INTRAMUSCULAR; INTRAVENOUS; SUBCUTANEOUS DAILY
Status: DISCONTINUED | OUTPATIENT
Start: 2025-03-06 | End: 2025-03-17 | Stop reason: HOSPADM

## 2025-03-06 RX ORDER — DIPHENHYDRAMINE HYDROCHLORIDE 50 MG/ML
25 INJECTION, SOLUTION INTRAMUSCULAR; INTRAVENOUS ONCE
Status: COMPLETED | OUTPATIENT
Start: 2025-03-06 | End: 2025-03-06

## 2025-03-06 RX ADMIN — PANTOPRAZOLE SODIUM 40 MG: 40 INJECTION, POWDER, FOR SOLUTION INTRAVENOUS at 09:18

## 2025-03-06 RX ADMIN — ARFORMOTEROL TARTRATE 15 MCG: 15 SOLUTION RESPIRATORY (INHALATION) at 21:03

## 2025-03-06 RX ADMIN — BUDESONIDE INHALATION 500 MCG: 0.5 SUSPENSION RESPIRATORY (INHALATION) at 21:03

## 2025-03-06 RX ADMIN — BUMETANIDE 2 MG: 0.25 INJECTION INTRAMUSCULAR; INTRAVENOUS at 16:13

## 2025-03-06 RX ADMIN — BUMETANIDE 2 MG: 0.25 INJECTION INTRAMUSCULAR; INTRAVENOUS at 09:18

## 2025-03-06 RX ADMIN — DEXTROSE MONOHYDRATE: 50 INJECTION, SOLUTION INTRAVENOUS at 09:34

## 2025-03-06 RX ADMIN — THIAMINE HYDROCHLORIDE 100 MG: 100 INJECTION, SOLUTION INTRAMUSCULAR; INTRAVENOUS at 09:17

## 2025-03-06 RX ADMIN — Medication 4 ML: at 10:02

## 2025-03-06 RX ADMIN — IPRATROPIUM BROMIDE AND ALBUTEROL SULFATE 1 DOSE: 2.5; .5 SOLUTION RESPIRATORY (INHALATION) at 21:03

## 2025-03-06 RX ADMIN — IPRATROPIUM BROMIDE AND ALBUTEROL SULFATE 1 DOSE: 2.5; .5 SOLUTION RESPIRATORY (INHALATION) at 10:02

## 2025-03-06 RX ADMIN — POTASSIUM CHLORIDE 10 MEQ: 7.46 INJECTION, SOLUTION INTRAVENOUS at 09:37

## 2025-03-06 RX ADMIN — CEFEPIME 2000 MG: 2 INJECTION, POWDER, FOR SOLUTION INTRAVENOUS at 23:32

## 2025-03-06 RX ADMIN — POTASSIUM CHLORIDE 10 MEQ: 7.46 INJECTION, SOLUTION INTRAVENOUS at 09:34

## 2025-03-06 RX ADMIN — CEFEPIME 2000 MG: 2 INJECTION, POWDER, FOR SOLUTION INTRAVENOUS at 11:53

## 2025-03-06 RX ADMIN — SODIUM CHLORIDE, PRESERVATIVE FREE 10 ML: 5 INJECTION INTRAVENOUS at 19:45

## 2025-03-06 RX ADMIN — SODIUM CHLORIDE, PRESERVATIVE FREE 10 ML: 5 INJECTION INTRAVENOUS at 10:06

## 2025-03-06 RX ADMIN — IPRATROPIUM BROMIDE AND ALBUTEROL SULFATE 1 DOSE: 2.5; .5 SOLUTION RESPIRATORY (INHALATION) at 13:50

## 2025-03-06 RX ADMIN — DIPHENHYDRAMINE HYDROCHLORIDE 25 MG: 50 INJECTION INTRAMUSCULAR; INTRAVENOUS at 16:13

## 2025-03-06 RX ADMIN — POTASSIUM CHLORIDE 10 MEQ: 7.46 INJECTION, SOLUTION INTRAVENOUS at 12:08

## 2025-03-06 RX ADMIN — ARFORMOTEROL TARTRATE 15 MCG: 15 SOLUTION RESPIRATORY (INHALATION) at 10:02

## 2025-03-06 RX ADMIN — POTASSIUM CHLORIDE 10 MEQ: 7.46 INJECTION, SOLUTION INTRAVENOUS at 11:36

## 2025-03-06 RX ADMIN — FOLIC ACID 1 MG: 5 INJECTION, SOLUTION INTRAMUSCULAR; INTRAVENOUS; SUBCUTANEOUS at 11:43

## 2025-03-06 RX ADMIN — DEXTROSE MONOHYDRATE: 50 INJECTION, SOLUTION INTRAVENOUS at 19:45

## 2025-03-06 RX ADMIN — BUDESONIDE INHALATION 500 MCG: 0.5 SUSPENSION RESPIRATORY (INHALATION) at 10:02

## 2025-03-06 RX ADMIN — HEPARIN SODIUM 13 UNITS/KG/HR: 10000 INJECTION, SOLUTION INTRAVENOUS at 06:42

## 2025-03-06 ASSESSMENT — PAIN SCALES - GENERAL
PAINLEVEL_OUTOF10: 0

## 2025-03-06 NOTE — DISCHARGE INSTR - COC
Thick Liquids  Daily Fluid Restriction: no  Last Modified Barium Swallow with Video (Video Swallowing Test): done 2/20/25    Treatments at the Time of Hospital Discharge:   Respiratory Treatments:   Oxygen Therapy:  is not on home oxygen therapy.  Ventilator:    - No ventilator support    Rehab Therapies: Physical Therapy and Occupational Therapy  Weight Bearing Status/Restrictions: No weight bearing restrictions  Other Medical Equipment (for information only, NOT a DME order):  walker  Other Treatments:     Patient's personal belongings (please select all that are sent with patient):  None    RN SIGNATURE:  Electronically signed by Mary Kay Hernandez RN on 3/17/25 at 1:43 PM EDT    CASE MANAGEMENT/SOCIAL WORK SECTION    Inpatient Status Date: 02/17/25    Readmission Risk Assessment Score:  [facility] RISK OF UNPLANNED READMISSION 2.0             23.8 Total Score        Discharging to Facility/ Agency   [address] [address] [phone]    Dialysis Facility (if applicable)   Name:  Address:  Dialysis Schedule:  Phone:  Fax:    / signature: Electronically signed by Laquita Goodwin RN on 3/6/25 at 2:56 PM EST    PHYSICIAN SECTION    Prognosis: {Prognosis:6303056480}    Condition at Discharge: { Patient Condition:362096329}    Rehab Potential (if transferring to Rehab): {Prognosis:7175463784}    Recommended Labs or Other Treatments After Discharge: ***    Physician Certification: I certify the above information and transfer of Len Dorsey  is necessary for the continuing treatment of the diagnosis listed and that he requires Skilled Nursing Facility for less 30 days.     Update Admission H&P: {CHP DME Changes in HandP:589677775}    PHYSICIAN SIGNATURE:  {Esignature:[id number]}

## 2025-03-07 ENCOUNTER — APPOINTMENT (OUTPATIENT)
Dept: GENERAL RADIOLOGY | Age: 60
DRG: 207 | End: 2025-03-07
Payer: MEDICARE

## 2025-03-07 LAB
ALBUMIN SERPL-MCNC: 3.3 G/DL (ref 3.5–5.2)
ALP SERPL-CCNC: 220 U/L (ref 40–129)
ALT SERPL-CCNC: 30 U/L (ref 0–40)
ANION GAP SERPL CALCULATED.3IONS-SCNC: 16 MMOL/L (ref 7–16)
ANION GAP SERPL CALCULATED.3IONS-SCNC: 16 MMOL/L (ref 7–16)
AST SERPL-CCNC: 57 U/L (ref 0–39)
BASOPHILS # BLD: 0 K/UL (ref 0–0.2)
BASOPHILS NFR BLD: 0 % (ref 0–2)
BILIRUB SERPL-MCNC: 2.2 MG/DL (ref 0–1.2)
BUN SERPL-MCNC: 26 MG/DL (ref 6–20)
BUN SERPL-MCNC: 27 MG/DL (ref 6–20)
CALCIUM SERPL-MCNC: 8.5 MG/DL (ref 8.6–10.2)
CALCIUM SERPL-MCNC: 9 MG/DL (ref 8.6–10.2)
CHLORIDE SERPL-SCNC: 110 MMOL/L (ref 98–107)
CHLORIDE SERPL-SCNC: 116 MMOL/L (ref 98–107)
CO2 SERPL-SCNC: 21 MMOL/L (ref 22–29)
CO2 SERPL-SCNC: 22 MMOL/L (ref 22–29)
CREAT SERPL-MCNC: 2 MG/DL (ref 0.7–1.2)
CREAT SERPL-MCNC: 2.1 MG/DL (ref 0.7–1.2)
EOSINOPHIL # BLD: 0.3 K/UL (ref 0.05–0.5)
EOSINOPHILS RELATIVE PERCENT: 4 % (ref 0–6)
ERYTHROCYTE [DISTWIDTH] IN BLOOD BY AUTOMATED COUNT: 20.3 % (ref 11.5–15)
GFR, ESTIMATED: 36 ML/MIN/1.73M2
GFR, ESTIMATED: 38 ML/MIN/1.73M2
GLUCOSE SERPL-MCNC: 105 MG/DL (ref 74–99)
GLUCOSE SERPL-MCNC: 310 MG/DL (ref 74–99)
HCT VFR BLD AUTO: 30.5 % (ref 37–54)
HGB BLD-MCNC: 8.7 G/DL (ref 12.5–16.5)
INR PPP: 1.9
LYMPHOCYTES NFR BLD: 0.45 K/UL (ref 1.5–4)
LYMPHOCYTES RELATIVE PERCENT: 5 % (ref 20–42)
MAGNESIUM SERPL-MCNC: 1.9 MG/DL (ref 1.6–2.6)
MCH RBC QN AUTO: 30.2 PG (ref 26–35)
MCHC RBC AUTO-ENTMCNC: 28.5 G/DL (ref 32–34.5)
MCV RBC AUTO: 105.9 FL (ref 80–99.9)
MONOCYTES NFR BLD: 0.52 K/UL (ref 0.1–0.95)
MONOCYTES NFR BLD: 6 % (ref 2–12)
NEUTROPHILS NFR BLD: 85 % (ref 43–80)
NEUTS SEG NFR BLD: 7.23 K/UL (ref 1.8–7.3)
NUCLEATED RED BLOOD CELLS: 1 PER 100 WBC
PARTIAL THROMBOPLASTIN TIME: 39.4 SEC (ref 24.5–35.1)
PARTIAL THROMBOPLASTIN TIME: 52.1 SEC (ref 24.5–35.1)
PARTIAL THROMBOPLASTIN TIME: 58.5 SEC (ref 24.5–35.1)
PHOSPHATE SERPL-MCNC: 2.2 MG/DL (ref 2.5–4.5)
PLATELET CONFIRMATION: NORMAL
PLATELET, FLUORESCENCE: 98 K/UL (ref 130–450)
PMV BLD AUTO: ABNORMAL FL (ref 7–12)
POTASSIUM SERPL-SCNC: 3.1 MMOL/L (ref 3.5–5)
POTASSIUM SERPL-SCNC: 3.7 MMOL/L (ref 3.5–5)
PROT SERPL-MCNC: 7.1 G/DL (ref 6.4–8.3)
PROTHROMBIN TIME: 20.6 SEC (ref 9.3–12.4)
RBC # BLD AUTO: 2.88 M/UL (ref 3.8–5.8)
RBC # BLD: ABNORMAL 10*6/UL
SODIUM SERPL-SCNC: 148 MMOL/L (ref 132–146)
SODIUM SERPL-SCNC: 153 MMOL/L (ref 132–146)
WBC OTHER # BLD: 8.5 K/UL (ref 4.5–11.5)

## 2025-03-07 PROCEDURE — 6360000002 HC RX W HCPCS

## 2025-03-07 PROCEDURE — 6370000000 HC RX 637 (ALT 250 FOR IP)

## 2025-03-07 PROCEDURE — 92611 MOTION FLUOROSCOPY/SWALLOW: CPT

## 2025-03-07 PROCEDURE — 1200000000 HC SEMI PRIVATE

## 2025-03-07 PROCEDURE — 84100 ASSAY OF PHOSPHORUS: CPT

## 2025-03-07 PROCEDURE — 92526 ORAL FUNCTION THERAPY: CPT

## 2025-03-07 PROCEDURE — 85610 PROTHROMBIN TIME: CPT

## 2025-03-07 PROCEDURE — 71045 X-RAY EXAM CHEST 1 VIEW: CPT

## 2025-03-07 PROCEDURE — 99232 SBSQ HOSP IP/OBS MODERATE 35: CPT

## 2025-03-07 PROCEDURE — 83735 ASSAY OF MAGNESIUM: CPT

## 2025-03-07 PROCEDURE — 6370000000 HC RX 637 (ALT 250 FOR IP): Performed by: INTERNAL MEDICINE

## 2025-03-07 PROCEDURE — 80053 COMPREHEN METABOLIC PANEL: CPT

## 2025-03-07 PROCEDURE — 85025 COMPLETE CBC W/AUTO DIFF WBC: CPT

## 2025-03-07 PROCEDURE — 2580000003 HC RX 258: Performed by: FAMILY MEDICINE

## 2025-03-07 PROCEDURE — 2700000000 HC OXYGEN THERAPY PER DAY

## 2025-03-07 PROCEDURE — 2500000003 HC RX 250 WO HCPCS

## 2025-03-07 PROCEDURE — 36415 COLL VENOUS BLD VENIPUNCTURE: CPT

## 2025-03-07 PROCEDURE — 2580000003 HC RX 258

## 2025-03-07 PROCEDURE — 85730 THROMBOPLASTIN TIME PARTIAL: CPT

## 2025-03-07 PROCEDURE — 94640 AIRWAY INHALATION TREATMENT: CPT

## 2025-03-07 PROCEDURE — 74230 X-RAY XM SWLNG FUNCJ C+: CPT

## 2025-03-07 RX ORDER — LIDOCAINE HYDROCHLORIDE 10 MG/ML
50 INJECTION, SOLUTION EPIDURAL; INFILTRATION; INTRACAUDAL; PERINEURAL ONCE
Status: DISCONTINUED | OUTPATIENT
Start: 2025-03-07 | End: 2025-03-17 | Stop reason: HOSPADM

## 2025-03-07 RX ORDER — SODIUM CHLORIDE 0.9 % (FLUSH) 0.9 %
5-40 SYRINGE (ML) INJECTION PRN
Status: DISCONTINUED | OUTPATIENT
Start: 2025-03-07 | End: 2025-03-17 | Stop reason: HOSPADM

## 2025-03-07 RX ORDER — SODIUM CHLORIDE 9 MG/ML
INJECTION, SOLUTION INTRAVENOUS PRN
Status: DISCONTINUED | OUTPATIENT
Start: 2025-03-07 | End: 2025-03-17 | Stop reason: HOSPADM

## 2025-03-07 RX ORDER — DEXTROSE MONOHYDRATE 50 MG/ML
INJECTION, SOLUTION INTRAVENOUS CONTINUOUS
Status: DISCONTINUED | OUTPATIENT
Start: 2025-03-07 | End: 2025-03-10

## 2025-03-07 RX ORDER — POTASSIUM CHLORIDE 7.45 MG/ML
10 INJECTION INTRAVENOUS
Status: DISPENSED | OUTPATIENT
Start: 2025-03-07 | End: 2025-03-07

## 2025-03-07 RX ORDER — SODIUM CHLORIDE 0.9 % (FLUSH) 0.9 %
5-40 SYRINGE (ML) INJECTION EVERY 12 HOURS SCHEDULED
Status: DISCONTINUED | OUTPATIENT
Start: 2025-03-07 | End: 2025-03-17 | Stop reason: HOSPADM

## 2025-03-07 RX ADMIN — IPRATROPIUM BROMIDE AND ALBUTEROL SULFATE 1 DOSE: 2.5; .5 SOLUTION RESPIRATORY (INHALATION) at 16:46

## 2025-03-07 RX ADMIN — ARFORMOTEROL TARTRATE 15 MCG: 15 SOLUTION RESPIRATORY (INHALATION) at 07:50

## 2025-03-07 RX ADMIN — POTASSIUM CHLORIDE 10 MEQ: 7.46 INJECTION, SOLUTION INTRAVENOUS at 01:02

## 2025-03-07 RX ADMIN — SODIUM CHLORIDE, PRESERVATIVE FREE 10 ML: 5 INJECTION INTRAVENOUS at 09:54

## 2025-03-07 RX ADMIN — BUMETANIDE 2 MG: 0.25 INJECTION INTRAMUSCULAR; INTRAVENOUS at 18:20

## 2025-03-07 RX ADMIN — SODIUM CHLORIDE: 0.9 INJECTION, SOLUTION INTRAVENOUS at 03:47

## 2025-03-07 RX ADMIN — BUDESONIDE INHALATION 500 MCG: 0.5 SUSPENSION RESPIRATORY (INHALATION) at 07:50

## 2025-03-07 RX ADMIN — PANTOPRAZOLE SODIUM 40 MG: 40 INJECTION, POWDER, FOR SOLUTION INTRAVENOUS at 09:52

## 2025-03-07 RX ADMIN — IPRATROPIUM BROMIDE AND ALBUTEROL SULFATE 1 DOSE: 2.5; .5 SOLUTION RESPIRATORY (INHALATION) at 11:39

## 2025-03-07 RX ADMIN — POTASSIUM CHLORIDE 10 MEQ: 7.46 INJECTION, SOLUTION INTRAVENOUS at 03:23

## 2025-03-07 RX ADMIN — IPRATROPIUM BROMIDE AND ALBUTEROL SULFATE 1 DOSE: 2.5; .5 SOLUTION RESPIRATORY (INHALATION) at 07:50

## 2025-03-07 RX ADMIN — HEPARIN SODIUM 13 UNITS/KG/HR: 10000 INJECTION, SOLUTION INTRAVENOUS at 00:28

## 2025-03-07 RX ADMIN — THIAMINE HYDROCHLORIDE 100 MG: 100 INJECTION, SOLUTION INTRAMUSCULAR; INTRAVENOUS at 09:51

## 2025-03-07 RX ADMIN — HEPARIN SODIUM 15 UNITS/KG/HR: 10000 INJECTION, SOLUTION INTRAVENOUS at 18:17

## 2025-03-07 RX ADMIN — ARFORMOTEROL TARTRATE 15 MCG: 15 SOLUTION RESPIRATORY (INHALATION) at 19:58

## 2025-03-07 RX ADMIN — BUDESONIDE INHALATION 500 MCG: 0.5 SUSPENSION RESPIRATORY (INHALATION) at 19:59

## 2025-03-07 RX ADMIN — WARFARIN SODIUM 1 MG: 1 TABLET ORAL at 18:33

## 2025-03-07 RX ADMIN — IPRATROPIUM BROMIDE AND ALBUTEROL SULFATE 1 DOSE: 2.5; .5 SOLUTION RESPIRATORY (INHALATION) at 19:58

## 2025-03-07 RX ADMIN — FOLIC ACID 1 MG: 5 INJECTION, SOLUTION INTRAMUSCULAR; INTRAVENOUS; SUBCUTANEOUS at 09:53

## 2025-03-07 RX ADMIN — LACTULOSE 20 G: 10 SOLUTION ORAL at 21:07

## 2025-03-07 RX ADMIN — BUMETANIDE 2 MG: 0.25 INJECTION INTRAMUSCULAR; INTRAVENOUS at 09:52

## 2025-03-07 RX ADMIN — HEPARIN SODIUM 2000 UNITS: 1000 INJECTION INTRAVENOUS; SUBCUTANEOUS at 16:25

## 2025-03-07 RX ADMIN — RIFAXIMIN 400 MG: 200 TABLET ORAL at 21:07

## 2025-03-07 RX ADMIN — SODIUM CHLORIDE, PRESERVATIVE FREE 10 ML: 5 INJECTION INTRAVENOUS at 22:16

## 2025-03-07 RX ADMIN — SODIUM CHLORIDE, PRESERVATIVE FREE 10 ML: 5 INJECTION INTRAVENOUS at 22:15

## 2025-03-07 ASSESSMENT — PAIN SCALES - GENERAL
PAINLEVEL_OUTOF10: 0

## 2025-03-07 NOTE — ACP (ADVANCE CARE PLANNING)
Advance Care Planning   Healthcare Decision Maker:    Primary Decision Maker: Leonie Moreland - Domestic Partner - [phone]    Click here to complete Healthcare Decision Makers including selection of the Healthcare Decision Maker Relationship (ie \"Primary\").  Today we documented Decision Maker(s) consistent with ACP documents on file.       If the relationship to the patient does NOT follow our state's Next of Kin hierarchy, the patient MUST complete an ACP Document to allow him/her to act on the patient's behalf. :

## 2025-03-08 LAB
ALBUMIN SERPL-MCNC: 2.8 G/DL (ref 3.5–5.2)
ALP SERPL-CCNC: 225 U/L (ref 40–129)
ALT SERPL-CCNC: 30 U/L (ref 0–40)
ANION GAP SERPL CALCULATED.3IONS-SCNC: 16 MMOL/L (ref 7–16)
AST SERPL-CCNC: 59 U/L (ref 0–39)
BASOPHILS # BLD: 0.07 K/UL (ref 0–0.2)
BASOPHILS NFR BLD: 1 % (ref 0–2)
BILIRUB SERPL-MCNC: 2.4 MG/DL (ref 0–1.2)
BUN SERPL-MCNC: 28 MG/DL (ref 6–20)
CALCIUM SERPL-MCNC: 8.8 MG/DL (ref 8.6–10.2)
CHLORIDE SERPL-SCNC: 115 MMOL/L (ref 98–107)
CO2 SERPL-SCNC: 17 MMOL/L (ref 22–29)
CREAT SERPL-MCNC: 2.2 MG/DL (ref 0.7–1.2)
EOSINOPHIL # BLD: 0.37 K/UL (ref 0.05–0.5)
EOSINOPHILS RELATIVE PERCENT: 5 % (ref 0–6)
ERYTHROCYTE [DISTWIDTH] IN BLOOD BY AUTOMATED COUNT: 20.1 % (ref 11.5–15)
GFR, ESTIMATED: 34 ML/MIN/1.73M2
GLUCOSE SERPL-MCNC: 90 MG/DL (ref 74–99)
HCT VFR BLD AUTO: 32.3 % (ref 37–54)
HGB BLD-MCNC: 9 G/DL (ref 12.5–16.5)
IMM GRANULOCYTES # BLD AUTO: 0.08 K/UL (ref 0–0.58)
IMM GRANULOCYTES NFR BLD: 1 % (ref 0–5)
INR PPP: 1.7
LYMPHOCYTES NFR BLD: 0.78 K/UL (ref 1.5–4)
LYMPHOCYTES RELATIVE PERCENT: 10 % (ref 20–42)
MAGNESIUM SERPL-MCNC: 1.7 MG/DL (ref 1.6–2.6)
MCH RBC QN AUTO: 31 PG (ref 26–35)
MCHC RBC AUTO-ENTMCNC: 27.9 G/DL (ref 32–34.5)
MCV RBC AUTO: 111.4 FL (ref 80–99.9)
MONOCYTES NFR BLD: 0.78 K/UL (ref 0.1–0.95)
MONOCYTES NFR BLD: 10 % (ref 2–12)
NEUTROPHILS NFR BLD: 74 % (ref 43–80)
NEUTS SEG NFR BLD: 5.86 K/UL (ref 1.8–7.3)
PARTIAL THROMBOPLASTIN TIME: 50.1 SEC (ref 24.5–35.1)
PARTIAL THROMBOPLASTIN TIME: 52.8 SEC (ref 24.5–35.1)
PARTIAL THROMBOPLASTIN TIME: 56.7 SEC (ref 24.5–35.1)
PHOSPHATE SERPL-MCNC: 2.7 MG/DL (ref 2.5–4.5)
PLATELET CONFIRMATION: NORMAL
PLATELET, FLUORESCENCE: 89 K/UL (ref 130–450)
PMV BLD AUTO: 13.9 FL (ref 7–12)
POTASSIUM SERPL-SCNC: 4.5 MMOL/L (ref 3.5–5)
PROT SERPL-MCNC: 6.9 G/DL (ref 6.4–8.3)
PROTHROMBIN TIME: 18.7 SEC (ref 9.3–12.4)
RBC # BLD AUTO: 2.9 M/UL (ref 3.8–5.8)
RBC # BLD: ABNORMAL 10*6/UL
SODIUM SERPL-SCNC: 148 MMOL/L (ref 132–146)
WBC OTHER # BLD: 7.9 K/UL (ref 4.5–11.5)

## 2025-03-08 PROCEDURE — 85610 PROTHROMBIN TIME: CPT

## 2025-03-08 PROCEDURE — 85025 COMPLETE CBC W/AUTO DIFF WBC: CPT

## 2025-03-08 PROCEDURE — 36415 COLL VENOUS BLD VENIPUNCTURE: CPT

## 2025-03-08 PROCEDURE — 99233 SBSQ HOSP IP/OBS HIGH 50: CPT | Performed by: INTERNAL MEDICINE

## 2025-03-08 PROCEDURE — 83735 ASSAY OF MAGNESIUM: CPT

## 2025-03-08 PROCEDURE — 84100 ASSAY OF PHOSPHORUS: CPT

## 2025-03-08 PROCEDURE — 94640 AIRWAY INHALATION TREATMENT: CPT

## 2025-03-08 PROCEDURE — 85730 THROMBOPLASTIN TIME PARTIAL: CPT

## 2025-03-08 PROCEDURE — 2500000003 HC RX 250 WO HCPCS

## 2025-03-08 PROCEDURE — 6370000000 HC RX 637 (ALT 250 FOR IP): Performed by: INTERNAL MEDICINE

## 2025-03-08 PROCEDURE — 6370000000 HC RX 637 (ALT 250 FOR IP)

## 2025-03-08 PROCEDURE — 1200000000 HC SEMI PRIVATE

## 2025-03-08 PROCEDURE — 6360000002 HC RX W HCPCS

## 2025-03-08 PROCEDURE — 36410 VNPNXR 3YR/> PHY/QHP DX/THER: CPT

## 2025-03-08 PROCEDURE — 99232 SBSQ HOSP IP/OBS MODERATE 35: CPT | Performed by: INTERNAL MEDICINE

## 2025-03-08 PROCEDURE — 2700000000 HC OXYGEN THERAPY PER DAY

## 2025-03-08 PROCEDURE — 80053 COMPREHEN METABOLIC PANEL: CPT

## 2025-03-08 PROCEDURE — 2580000003 HC RX 258

## 2025-03-08 PROCEDURE — C1751 CATH, INF, PER/CENT/MIDLINE: HCPCS

## 2025-03-08 PROCEDURE — 76937 US GUIDE VASCULAR ACCESS: CPT

## 2025-03-08 PROCEDURE — 05HA33Z INSERTION OF INFUSION DEVICE INTO LEFT BRACHIAL VEIN, PERCUTANEOUS APPROACH: ICD-10-PCS | Performed by: FAMILY MEDICINE

## 2025-03-08 RX ORDER — IPRATROPIUM BROMIDE AND ALBUTEROL SULFATE 2.5; .5 MG/3ML; MG/3ML
1 SOLUTION RESPIRATORY (INHALATION) EVERY 4 HOURS PRN
Status: DISCONTINUED | OUTPATIENT
Start: 2025-03-08 | End: 2025-03-17 | Stop reason: HOSPADM

## 2025-03-08 RX ORDER — WARFARIN SODIUM 2 MG/1
2 TABLET ORAL
Status: COMPLETED | OUTPATIENT
Start: 2025-03-08 | End: 2025-03-08

## 2025-03-08 RX ADMIN — CEFEPIME 2000 MG: 2 INJECTION, POWDER, FOR SOLUTION INTRAVENOUS at 13:34

## 2025-03-08 RX ADMIN — CEFEPIME 2000 MG: 2 INJECTION, POWDER, FOR SOLUTION INTRAVENOUS at 22:54

## 2025-03-08 RX ADMIN — WARFARIN SODIUM 2 MG: 2 TABLET ORAL at 18:00

## 2025-03-08 RX ADMIN — HEPARIN SODIUM 15 UNITS/KG/HR: 10000 INJECTION, SOLUTION INTRAVENOUS at 10:23

## 2025-03-08 RX ADMIN — BUDESONIDE INHALATION 500 MCG: 0.5 SUSPENSION RESPIRATORY (INHALATION) at 20:38

## 2025-03-08 RX ADMIN — ARFORMOTEROL TARTRATE 15 MCG: 15 SOLUTION RESPIRATORY (INHALATION) at 08:51

## 2025-03-08 RX ADMIN — ARFORMOTEROL TARTRATE 15 MCG: 15 SOLUTION RESPIRATORY (INHALATION) at 20:38

## 2025-03-08 RX ADMIN — BUMETANIDE 2 MG: 0.25 INJECTION INTRAMUSCULAR; INTRAVENOUS at 09:03

## 2025-03-08 RX ADMIN — THIAMINE HYDROCHLORIDE 100 MG: 100 INJECTION, SOLUTION INTRAMUSCULAR; INTRAVENOUS at 09:03

## 2025-03-08 RX ADMIN — RIFAXIMIN 400 MG: 200 TABLET ORAL at 09:03

## 2025-03-08 RX ADMIN — Medication 400 MG: at 09:03

## 2025-03-08 RX ADMIN — SODIUM CHLORIDE, PRESERVATIVE FREE 10 ML: 5 INJECTION INTRAVENOUS at 09:04

## 2025-03-08 RX ADMIN — RIFAXIMIN 400 MG: 200 TABLET ORAL at 20:02

## 2025-03-08 RX ADMIN — BUMETANIDE 2 MG: 0.25 INJECTION INTRAMUSCULAR; INTRAVENOUS at 17:59

## 2025-03-08 RX ADMIN — IPRATROPIUM BROMIDE AND ALBUTEROL SULFATE 1 DOSE: 2.5; .5 SOLUTION RESPIRATORY (INHALATION) at 08:51

## 2025-03-08 RX ADMIN — LACTULOSE 20 G: 10 SOLUTION ORAL at 20:02

## 2025-03-08 RX ADMIN — FOLIC ACID 1 MG: 5 INJECTION, SOLUTION INTRAMUSCULAR; INTRAVENOUS; SUBCUTANEOUS at 09:08

## 2025-03-08 RX ADMIN — RIFAXIMIN 400 MG: 200 TABLET ORAL at 14:55

## 2025-03-08 RX ADMIN — DEXTROSE MONOHYDRATE: 50 INJECTION, SOLUTION INTRAVENOUS at 11:11

## 2025-03-08 RX ADMIN — PANTOPRAZOLE SODIUM 40 MG: 40 INJECTION, POWDER, FOR SOLUTION INTRAVENOUS at 09:03

## 2025-03-08 RX ADMIN — MULTIVITAMIN TABLET 1 TABLET: TABLET at 09:03

## 2025-03-08 RX ADMIN — BUDESONIDE INHALATION 500 MCG: 0.5 SUSPENSION RESPIRATORY (INHALATION) at 08:51

## 2025-03-08 NOTE — RT PROTOCOL NOTE
Frequency and one order with Frequency of every 4 hours PRN wheezing or increased work of breathing using Per Protocol order mode.       11-13 - enter or revise RT Bronchodilator order(s) to one equivalent RT bronchodilator order with QID Frequency and an Albuterol order with Frequency of every 4 hours PRN wheezing or increased work of breathing using Per Protocol order mode.      Greater than 13 - enter or revise RT Bronchodilator order(s) to one equivalent RT bronchodilator order with every 4 hours Frequency and an Albuterol order with Frequency of every 2 hours PRN wheezing or increased work of breathing using Per Protocol order mode.       Electronically signed by Megan Madsen on 3/8/2025 at 11:55 AM

## 2025-03-09 LAB
ALBUMIN SERPL-MCNC: 3.4 G/DL (ref 3.5–5.2)
ALP SERPL-CCNC: 250 U/L (ref 40–129)
ALT SERPL-CCNC: 33 U/L (ref 0–40)
ANION GAP SERPL CALCULATED.3IONS-SCNC: 17 MMOL/L (ref 7–16)
AST SERPL-CCNC: 64 U/L (ref 0–39)
BASOPHILS # BLD: 0.05 K/UL (ref 0–0.2)
BASOPHILS NFR BLD: 1 % (ref 0–2)
BILIRUB SERPL-MCNC: 2.2 MG/DL (ref 0–1.2)
BUN SERPL-MCNC: 31 MG/DL (ref 6–20)
CALCIUM SERPL-MCNC: 9.1 MG/DL (ref 8.6–10.2)
CHLORIDE SERPL-SCNC: 110 MMOL/L (ref 98–107)
CO2 SERPL-SCNC: 20 MMOL/L (ref 22–29)
CREAT SERPL-MCNC: 2.3 MG/DL (ref 0.7–1.2)
EOSINOPHIL # BLD: 0.44 K/UL (ref 0.05–0.5)
EOSINOPHILS RELATIVE PERCENT: 5 % (ref 0–6)
ERYTHROCYTE [DISTWIDTH] IN BLOOD BY AUTOMATED COUNT: 19.9 % (ref 11.5–15)
GFR, ESTIMATED: 33 ML/MIN/1.73M2
GLUCOSE SERPL-MCNC: 122 MG/DL (ref 74–99)
HCT VFR BLD AUTO: 30.6 % (ref 37–54)
HGB BLD-MCNC: 9.1 G/DL (ref 12.5–16.5)
IMM GRANULOCYTES # BLD AUTO: 0.07 K/UL (ref 0–0.58)
IMM GRANULOCYTES NFR BLD: 1 % (ref 0–5)
INR PPP: 1.6
LYMPHOCYTES NFR BLD: 0.81 K/UL (ref 1.5–4)
LYMPHOCYTES RELATIVE PERCENT: 9 % (ref 20–42)
MAGNESIUM SERPL-MCNC: 1.6 MG/DL (ref 1.6–2.6)
MCH RBC QN AUTO: 30.8 PG (ref 26–35)
MCHC RBC AUTO-ENTMCNC: 29.7 G/DL (ref 32–34.5)
MCV RBC AUTO: 103.7 FL (ref 80–99.9)
MICROORGANISM SPEC CULT: NORMAL
MICROORGANISM SPEC CULT: NORMAL
MONOCYTES NFR BLD: 0.63 K/UL (ref 0.1–0.95)
MONOCYTES NFR BLD: 7 % (ref 2–12)
NEUTROPHILS NFR BLD: 78 % (ref 43–80)
NEUTS SEG NFR BLD: 7.07 K/UL (ref 1.8–7.3)
PARTIAL THROMBOPLASTIN TIME: 48.4 SEC (ref 24.5–35.1)
PARTIAL THROMBOPLASTIN TIME: 84.3 SEC (ref 24.5–35.1)
PHOSPHATE SERPL-MCNC: 2.7 MG/DL (ref 2.5–4.5)
PLATELET, FLUORESCENCE: 120 K/UL (ref 130–450)
PMV BLD AUTO: 13.7 FL (ref 7–12)
POTASSIUM SERPL-SCNC: 3.8 MMOL/L (ref 3.5–5)
PROT SERPL-MCNC: 7.2 G/DL (ref 6.4–8.3)
PROTHROMBIN TIME: 17.9 SEC (ref 9.3–12.4)
RBC # BLD AUTO: 2.95 M/UL (ref 3.8–5.8)
SERVICE CMNT-IMP: NORMAL
SERVICE CMNT-IMP: NORMAL
SODIUM SERPL-SCNC: 147 MMOL/L (ref 132–146)
SPECIMEN DESCRIPTION: NORMAL
SPECIMEN DESCRIPTION: NORMAL
WBC OTHER # BLD: 9.1 K/UL (ref 4.5–11.5)

## 2025-03-09 PROCEDURE — 99232 SBSQ HOSP IP/OBS MODERATE 35: CPT | Performed by: INTERNAL MEDICINE

## 2025-03-09 PROCEDURE — 85025 COMPLETE CBC W/AUTO DIFF WBC: CPT

## 2025-03-09 PROCEDURE — 83735 ASSAY OF MAGNESIUM: CPT

## 2025-03-09 PROCEDURE — 6370000000 HC RX 637 (ALT 250 FOR IP)

## 2025-03-09 PROCEDURE — 2700000000 HC OXYGEN THERAPY PER DAY

## 2025-03-09 PROCEDURE — 2500000003 HC RX 250 WO HCPCS

## 2025-03-09 PROCEDURE — 1200000000 HC SEMI PRIVATE

## 2025-03-09 PROCEDURE — 85730 THROMBOPLASTIN TIME PARTIAL: CPT

## 2025-03-09 PROCEDURE — 6360000002 HC RX W HCPCS

## 2025-03-09 PROCEDURE — 85610 PROTHROMBIN TIME: CPT

## 2025-03-09 PROCEDURE — 94640 AIRWAY INHALATION TREATMENT: CPT

## 2025-03-09 PROCEDURE — 84100 ASSAY OF PHOSPHORUS: CPT

## 2025-03-09 PROCEDURE — 80053 COMPREHEN METABOLIC PANEL: CPT

## 2025-03-09 PROCEDURE — 6370000000 HC RX 637 (ALT 250 FOR IP): Performed by: INTERNAL MEDICINE

## 2025-03-09 PROCEDURE — 36415 COLL VENOUS BLD VENIPUNCTURE: CPT

## 2025-03-09 PROCEDURE — 2580000003 HC RX 258

## 2025-03-09 RX ORDER — LORAZEPAM 2 MG/ML
1 INJECTION INTRAMUSCULAR EVERY 6 HOURS PRN
Status: DISCONTINUED | OUTPATIENT
Start: 2025-03-09 | End: 2025-03-17 | Stop reason: HOSPADM

## 2025-03-09 RX ORDER — WARFARIN SODIUM 2 MG/1
2 TABLET ORAL
Status: COMPLETED | OUTPATIENT
Start: 2025-03-09 | End: 2025-03-09

## 2025-03-09 RX ADMIN — HEPARIN SODIUM 2000 UNITS: 1000 INJECTION INTRAVENOUS; SUBCUTANEOUS at 08:18

## 2025-03-09 RX ADMIN — BUMETANIDE 2 MG: 0.25 INJECTION INTRAMUSCULAR; INTRAVENOUS at 18:03

## 2025-03-09 RX ADMIN — CEFEPIME 2000 MG: 2 INJECTION, POWDER, FOR SOLUTION INTRAVENOUS at 11:12

## 2025-03-09 RX ADMIN — BUDESONIDE INHALATION 500 MCG: 0.5 SUSPENSION RESPIRATORY (INHALATION) at 20:00

## 2025-03-09 RX ADMIN — ARFORMOTEROL TARTRATE 15 MCG: 15 SOLUTION RESPIRATORY (INHALATION) at 08:23

## 2025-03-09 RX ADMIN — HEPARIN SODIUM 17 UNITS/KG/HR: 10000 INJECTION, SOLUTION INTRAVENOUS at 08:22

## 2025-03-09 RX ADMIN — RIFAXIMIN 400 MG: 200 TABLET ORAL at 08:05

## 2025-03-09 RX ADMIN — RIFAXIMIN 400 MG: 200 TABLET ORAL at 20:07

## 2025-03-09 RX ADMIN — FOLIC ACID 1 MG: 5 INJECTION, SOLUTION INTRAMUSCULAR; INTRAVENOUS; SUBCUTANEOUS at 09:43

## 2025-03-09 RX ADMIN — WARFARIN SODIUM 2 MG: 2 TABLET ORAL at 18:03

## 2025-03-09 RX ADMIN — ARFORMOTEROL TARTRATE 15 MCG: 15 SOLUTION RESPIRATORY (INHALATION) at 20:00

## 2025-03-09 RX ADMIN — HEPARIN SODIUM 15 UNITS/KG/HR: 10000 INJECTION, SOLUTION INTRAVENOUS at 23:43

## 2025-03-09 RX ADMIN — HEPARIN SODIUM 15 UNITS/KG/HR: 10000 INJECTION, SOLUTION INTRAVENOUS at 03:37

## 2025-03-09 RX ADMIN — PANTOPRAZOLE SODIUM 40 MG: 40 INJECTION, POWDER, FOR SOLUTION INTRAVENOUS at 08:04

## 2025-03-09 RX ADMIN — Medication 400 MG: at 08:05

## 2025-03-09 RX ADMIN — RIFAXIMIN 400 MG: 200 TABLET ORAL at 14:02

## 2025-03-09 RX ADMIN — LACTULOSE 20 G: 10 SOLUTION ORAL at 08:03

## 2025-03-09 RX ADMIN — LACTULOSE 20 G: 10 SOLUTION ORAL at 14:02

## 2025-03-09 RX ADMIN — LACTULOSE 20 G: 10 SOLUTION ORAL at 20:07

## 2025-03-09 RX ADMIN — BUMETANIDE 2 MG: 0.25 INJECTION INTRAMUSCULAR; INTRAVENOUS at 08:04

## 2025-03-09 RX ADMIN — CEFEPIME 2000 MG: 2 INJECTION, POWDER, FOR SOLUTION INTRAVENOUS at 23:48

## 2025-03-09 RX ADMIN — HEPARIN SODIUM 13.89 UNITS/KG/HR: 10000 INJECTION, SOLUTION INTRAVENOUS at 17:42

## 2025-03-09 RX ADMIN — BUDESONIDE INHALATION 500 MCG: 0.5 SUSPENSION RESPIRATORY (INHALATION) at 08:23

## 2025-03-09 RX ADMIN — MULTIVITAMIN TABLET 1 TABLET: TABLET at 08:05

## 2025-03-09 RX ADMIN — THIAMINE HYDROCHLORIDE 100 MG: 100 INJECTION, SOLUTION INTRAMUSCULAR; INTRAVENOUS at 08:04

## 2025-03-10 LAB
ALBUMIN SERPL-MCNC: 3.4 G/DL (ref 3.5–5.2)
ALP SERPL-CCNC: 251 U/L (ref 40–129)
ALT SERPL-CCNC: 30 U/L (ref 0–40)
ANION GAP SERPL CALCULATED.3IONS-SCNC: 16 MMOL/L (ref 7–16)
AST SERPL-CCNC: 48 U/L (ref 0–39)
BASOPHILS # BLD: 0.04 K/UL (ref 0–0.2)
BASOPHILS NFR BLD: 0 % (ref 0–2)
BILIRUB SERPL-MCNC: 2 MG/DL (ref 0–1.2)
BUN SERPL-MCNC: 30 MG/DL (ref 6–20)
CALCIUM SERPL-MCNC: 9.2 MG/DL (ref 8.6–10.2)
CHLORIDE SERPL-SCNC: 104 MMOL/L (ref 98–107)
CO2 SERPL-SCNC: 24 MMOL/L (ref 22–29)
CREAT SERPL-MCNC: 2.2 MG/DL (ref 0.7–1.2)
EOSINOPHIL # BLD: 0.45 K/UL (ref 0.05–0.5)
EOSINOPHILS RELATIVE PERCENT: 4 % (ref 0–6)
ERYTHROCYTE [DISTWIDTH] IN BLOOD BY AUTOMATED COUNT: 19.5 % (ref 11.5–15)
GFR, ESTIMATED: 34 ML/MIN/1.73M2
GLUCOSE SERPL-MCNC: 108 MG/DL (ref 74–99)
HCT VFR BLD AUTO: 30.1 % (ref 37–54)
HGB BLD-MCNC: 9.2 G/DL (ref 12.5–16.5)
IMM GRANULOCYTES # BLD AUTO: 0.14 K/UL (ref 0–0.58)
IMM GRANULOCYTES NFR BLD: 1 % (ref 0–5)
INR PPP: 1.6
LYMPHOCYTES NFR BLD: 1.05 K/UL (ref 1.5–4)
LYMPHOCYTES RELATIVE PERCENT: 9 % (ref 20–42)
MAGNESIUM SERPL-MCNC: 1.4 MG/DL (ref 1.6–2.6)
MCH RBC QN AUTO: 31 PG (ref 26–35)
MCHC RBC AUTO-ENTMCNC: 30.6 G/DL (ref 32–34.5)
MCV RBC AUTO: 101.3 FL (ref 80–99.9)
MONOCYTES NFR BLD: 0.84 K/UL (ref 0.1–0.95)
MONOCYTES NFR BLD: 7 % (ref 2–12)
NEUTROPHILS NFR BLD: 79 % (ref 43–80)
NEUTS SEG NFR BLD: 9.17 K/UL (ref 1.8–7.3)
PARTIAL THROMBOPLASTIN TIME: 46.3 SEC (ref 24.5–35.1)
PHOSPHATE SERPL-MCNC: 1.7 MG/DL (ref 2.5–4.5)
PLATELET, FLUORESCENCE: 123 K/UL (ref 130–450)
PMV BLD AUTO: 13 FL (ref 7–12)
POTASSIUM SERPL-SCNC: 3.4 MMOL/L (ref 3.5–5)
PROT SERPL-MCNC: 7.3 G/DL (ref 6.4–8.3)
PROTHROMBIN TIME: 17.3 SEC (ref 9.3–12.4)
RBC # BLD AUTO: 2.97 M/UL (ref 3.8–5.8)
SODIUM SERPL-SCNC: 144 MMOL/L (ref 132–146)
WBC OTHER # BLD: 11.7 K/UL (ref 4.5–11.5)

## 2025-03-10 PROCEDURE — 84100 ASSAY OF PHOSPHORUS: CPT

## 2025-03-10 PROCEDURE — 2500000003 HC RX 250 WO HCPCS

## 2025-03-10 PROCEDURE — 97166 OT EVAL MOD COMPLEX 45 MIN: CPT

## 2025-03-10 PROCEDURE — 2580000003 HC RX 258

## 2025-03-10 PROCEDURE — 85730 THROMBOPLASTIN TIME PARTIAL: CPT

## 2025-03-10 PROCEDURE — 97530 THERAPEUTIC ACTIVITIES: CPT

## 2025-03-10 PROCEDURE — 6370000000 HC RX 637 (ALT 250 FOR IP)

## 2025-03-10 PROCEDURE — 85025 COMPLETE CBC W/AUTO DIFF WBC: CPT

## 2025-03-10 PROCEDURE — 99232 SBSQ HOSP IP/OBS MODERATE 35: CPT | Performed by: NURSE PRACTITIONER

## 2025-03-10 PROCEDURE — 6370000000 HC RX 637 (ALT 250 FOR IP): Performed by: INTERNAL MEDICINE

## 2025-03-10 PROCEDURE — 83735 ASSAY OF MAGNESIUM: CPT

## 2025-03-10 PROCEDURE — 6360000002 HC RX W HCPCS

## 2025-03-10 PROCEDURE — 1200000000 HC SEMI PRIVATE

## 2025-03-10 PROCEDURE — 97161 PT EVAL LOW COMPLEX 20 MIN: CPT

## 2025-03-10 PROCEDURE — 94640 AIRWAY INHALATION TREATMENT: CPT

## 2025-03-10 PROCEDURE — 85610 PROTHROMBIN TIME: CPT

## 2025-03-10 PROCEDURE — 80053 COMPREHEN METABOLIC PANEL: CPT

## 2025-03-10 PROCEDURE — 97535 SELF CARE MNGMENT TRAINING: CPT

## 2025-03-10 PROCEDURE — 99232 SBSQ HOSP IP/OBS MODERATE 35: CPT | Performed by: INTERNAL MEDICINE

## 2025-03-10 PROCEDURE — 6360000002 HC RX W HCPCS: Performed by: INTERNAL MEDICINE

## 2025-03-10 RX ORDER — LANOLIN ALCOHOL/MO/W.PET/CERES
100 CREAM (GRAM) TOPICAL DAILY
Status: DISCONTINUED | OUTPATIENT
Start: 2025-03-11 | End: 2025-03-17 | Stop reason: HOSPADM

## 2025-03-10 RX ORDER — MAGNESIUM SULFATE IN WATER 40 MG/ML
2000 INJECTION, SOLUTION INTRAVENOUS ONCE
Status: COMPLETED | OUTPATIENT
Start: 2025-03-10 | End: 2025-03-10

## 2025-03-10 RX ORDER — BUMETANIDE 1 MG/1
2 TABLET ORAL DAILY
Status: DISCONTINUED | OUTPATIENT
Start: 2025-03-11 | End: 2025-03-17 | Stop reason: HOSPADM

## 2025-03-10 RX ORDER — WARFARIN SODIUM 2 MG/1
2 TABLET ORAL
Status: COMPLETED | OUTPATIENT
Start: 2025-03-10 | End: 2025-03-10

## 2025-03-10 RX ADMIN — MAGNESIUM SULFATE HEPTAHYDRATE 2000 MG: 2 INJECTION, SOLUTION INTRAVENOUS at 12:25

## 2025-03-10 RX ADMIN — DEXTROSE MONOHYDRATE: 50 INJECTION, SOLUTION INTRAVENOUS at 07:36

## 2025-03-10 RX ADMIN — BUMETANIDE 2 MG: 0.25 INJECTION INTRAMUSCULAR; INTRAVENOUS at 08:20

## 2025-03-10 RX ADMIN — SODIUM CHLORIDE, PRESERVATIVE FREE 10 ML: 5 INJECTION INTRAVENOUS at 08:19

## 2025-03-10 RX ADMIN — ARFORMOTEROL TARTRATE 15 MCG: 15 SOLUTION RESPIRATORY (INHALATION) at 09:46

## 2025-03-10 RX ADMIN — WARFARIN SODIUM 2 MG: 2 TABLET ORAL at 18:23

## 2025-03-10 RX ADMIN — BUDESONIDE INHALATION 500 MCG: 0.5 SUSPENSION RESPIRATORY (INHALATION) at 09:47

## 2025-03-10 RX ADMIN — RIFAXIMIN 400 MG: 200 TABLET ORAL at 08:24

## 2025-03-10 RX ADMIN — SODIUM CHLORIDE, PRESERVATIVE FREE 10 ML: 5 INJECTION INTRAVENOUS at 21:03

## 2025-03-10 RX ADMIN — PANTOPRAZOLE SODIUM 40 MG: 40 INJECTION, POWDER, FOR SOLUTION INTRAVENOUS at 08:17

## 2025-03-10 RX ADMIN — CEFEPIME 2000 MG: 2 INJECTION, POWDER, FOR SOLUTION INTRAVENOUS at 18:27

## 2025-03-10 RX ADMIN — HEPARIN SODIUM 15 UNITS/KG/HR: 10000 INJECTION, SOLUTION INTRAVENOUS at 17:57

## 2025-03-10 RX ADMIN — LACTULOSE 20 G: 10 SOLUTION ORAL at 18:23

## 2025-03-10 RX ADMIN — SODIUM CHLORIDE, PRESERVATIVE FREE 10 ML: 5 INJECTION INTRAVENOUS at 08:14

## 2025-03-10 RX ADMIN — BUDESONIDE INHALATION 500 MCG: 0.5 SUSPENSION RESPIRATORY (INHALATION) at 19:32

## 2025-03-10 RX ADMIN — RIFAXIMIN 400 MG: 200 TABLET ORAL at 21:03

## 2025-03-10 RX ADMIN — LACTULOSE 20 G: 10 SOLUTION ORAL at 08:24

## 2025-03-10 RX ADMIN — FOLIC ACID 1 MG: 5 INJECTION, SOLUTION INTRAMUSCULAR; INTRAVENOUS; SUBCUTANEOUS at 08:14

## 2025-03-10 RX ADMIN — POTASSIUM PHOSPHATE, MONOBASIC AND POTASSIUM PHOSPHATE, DIBASIC 15 MMOL: 224; 236 INJECTION, SOLUTION, CONCENTRATE INTRAVENOUS at 14:30

## 2025-03-10 RX ADMIN — ARFORMOTEROL TARTRATE 15 MCG: 15 SOLUTION RESPIRATORY (INHALATION) at 19:32

## 2025-03-10 RX ADMIN — RIFAXIMIN 400 MG: 200 TABLET ORAL at 18:23

## 2025-03-10 RX ADMIN — LACTULOSE 20 G: 10 SOLUTION ORAL at 21:03

## 2025-03-10 RX ADMIN — MULTIVITAMIN TABLET 1 TABLET: TABLET at 08:24

## 2025-03-10 RX ADMIN — Medication 400 MG: at 08:24

## 2025-03-10 RX ADMIN — THIAMINE HYDROCHLORIDE 100 MG: 100 INJECTION, SOLUTION INTRAMUSCULAR; INTRAVENOUS at 08:22

## 2025-03-10 RX ADMIN — LORAZEPAM 1 MG: 2 INJECTION INTRAMUSCULAR; INTRAVENOUS at 21:52

## 2025-03-11 LAB
ALBUMIN SERPL-MCNC: 3.3 G/DL (ref 3.5–5.2)
ALP SERPL-CCNC: 229 U/L (ref 40–129)
ALT SERPL-CCNC: 27 U/L (ref 0–40)
ANION GAP SERPL CALCULATED.3IONS-SCNC: 15 MMOL/L (ref 7–16)
AST SERPL-CCNC: 51 U/L (ref 0–39)
B.E.: 0.9 MMOL/L (ref -3–3)
BASOPHILS # BLD: 0.04 K/UL (ref 0–0.2)
BASOPHILS NFR BLD: 0 % (ref 0–2)
BILIRUB SERPL-MCNC: 1.7 MG/DL (ref 0–1.2)
BUN SERPL-MCNC: 32 MG/DL (ref 6–20)
CALCIUM SERPL-MCNC: 8.9 MG/DL (ref 8.6–10.2)
CHLORIDE SERPL-SCNC: 105 MMOL/L (ref 98–107)
CO2 SERPL-SCNC: 23 MMOL/L (ref 22–29)
COHB: 1.1 % (ref 0–1.5)
CREAT SERPL-MCNC: 2.2 MG/DL (ref 0.7–1.2)
CRITICAL: ABNORMAL
DATE ANALYZED: ABNORMAL
DATE OF COLLECTION: ABNORMAL
EOSINOPHIL # BLD: 0.49 K/UL (ref 0.05–0.5)
EOSINOPHILS RELATIVE PERCENT: 5 % (ref 0–6)
ERYTHROCYTE [DISTWIDTH] IN BLOOD BY AUTOMATED COUNT: 19.8 % (ref 11.5–15)
GFR, ESTIMATED: 33 ML/MIN/1.73M2
GLUCOSE SERPL-MCNC: 109 MG/DL (ref 74–99)
HCO3: 24.4 MMOL/L (ref 22–26)
HCT VFR BLD AUTO: 29.9 % (ref 37–54)
HGB BLD-MCNC: 9.2 G/DL (ref 12.5–16.5)
HHB: 5.3 % (ref 0–5)
IMM GRANULOCYTES # BLD AUTO: 0.15 K/UL (ref 0–0.58)
IMM GRANULOCYTES NFR BLD: 1 % (ref 0–5)
INR PPP: 1.6
INR PPP: 1.8
LAB: ABNORMAL
LYMPHOCYTES NFR BLD: 1.43 K/UL (ref 1.5–4)
LYMPHOCYTES RELATIVE PERCENT: 13 % (ref 20–42)
Lab: 1147
MAGNESIUM SERPL-MCNC: 2 MG/DL (ref 1.6–2.6)
MCH RBC QN AUTO: 31 PG (ref 26–35)
MCHC RBC AUTO-ENTMCNC: 30.8 G/DL (ref 32–34.5)
MCV RBC AUTO: 100.7 FL (ref 80–99.9)
METHB: 0.5 % (ref 0–1.5)
MODE: ABNORMAL
MONOCYTES NFR BLD: 0.96 K/UL (ref 0.1–0.95)
MONOCYTES NFR BLD: 9 % (ref 2–12)
NEUTROPHILS NFR BLD: 72 % (ref 43–80)
NEUTS SEG NFR BLD: 7.72 K/UL (ref 1.8–7.3)
O2 SATURATION: 94.6 % (ref 92–98.5)
O2HB: 93.1 % (ref 94–97)
OPERATOR ID: 5134
PARTIAL THROMBOPLASTIN TIME: 68.4 SEC (ref 24.5–35.1)
PARTIAL THROMBOPLASTIN TIME: 72.1 SEC (ref 24.5–35.1)
PARTIAL THROMBOPLASTIN TIME: 73.2 SEC (ref 24.5–35.1)
PARTIAL THROMBOPLASTIN TIME: 96.6 SEC (ref 24.5–35.1)
PATIENT TEMP: 37 C
PCO2: 34.8 MMHG (ref 35–45)
PH BLOOD GAS: 7.46 (ref 7.35–7.45)
PHOSPHATE SERPL-MCNC: 2.8 MG/DL (ref 2.5–4.5)
PLATELET, FLUORESCENCE: 134 K/UL (ref 130–450)
PMV BLD AUTO: 13.4 FL (ref 7–12)
PO2: 75.7 MMHG (ref 75–100)
POTASSIUM SERPL-SCNC: 3.4 MMOL/L (ref 3.5–5)
PROT SERPL-MCNC: 6.9 G/DL (ref 6.4–8.3)
PROTHROMBIN TIME: 18 SEC (ref 9.3–12.4)
PROTHROMBIN TIME: 19.7 SEC (ref 9.3–12.4)
RBC # BLD AUTO: 2.97 M/UL (ref 3.8–5.8)
SODIUM SERPL-SCNC: 143 MMOL/L (ref 132–146)
SOURCE, BLOOD GAS: ABNORMAL
THB: 10.7 G/DL (ref 11.5–16.5)
TIME ANALYZED: 1150
WBC OTHER # BLD: 10.8 K/UL (ref 4.5–11.5)

## 2025-03-11 PROCEDURE — 80053 COMPREHEN METABOLIC PANEL: CPT

## 2025-03-11 PROCEDURE — 6360000002 HC RX W HCPCS: Performed by: INTERNAL MEDICINE

## 2025-03-11 PROCEDURE — 6370000000 HC RX 637 (ALT 250 FOR IP)

## 2025-03-11 PROCEDURE — 99232 SBSQ HOSP IP/OBS MODERATE 35: CPT | Performed by: NURSE PRACTITIONER

## 2025-03-11 PROCEDURE — 2580000003 HC RX 258

## 2025-03-11 PROCEDURE — 92526 ORAL FUNCTION THERAPY: CPT

## 2025-03-11 PROCEDURE — 83735 ASSAY OF MAGNESIUM: CPT

## 2025-03-11 PROCEDURE — 85730 THROMBOPLASTIN TIME PARTIAL: CPT

## 2025-03-11 PROCEDURE — 1200000000 HC SEMI PRIVATE

## 2025-03-11 PROCEDURE — 2500000003 HC RX 250 WO HCPCS

## 2025-03-11 PROCEDURE — 6360000002 HC RX W HCPCS

## 2025-03-11 PROCEDURE — 6370000000 HC RX 637 (ALT 250 FOR IP): Performed by: INTERNAL MEDICINE

## 2025-03-11 PROCEDURE — 85025 COMPLETE CBC W/AUTO DIFF WBC: CPT

## 2025-03-11 PROCEDURE — 99232 SBSQ HOSP IP/OBS MODERATE 35: CPT | Performed by: INTERNAL MEDICINE

## 2025-03-11 PROCEDURE — 36415 COLL VENOUS BLD VENIPUNCTURE: CPT

## 2025-03-11 PROCEDURE — 94640 AIRWAY INHALATION TREATMENT: CPT

## 2025-03-11 PROCEDURE — 82805 BLOOD GASES W/O2 SATURATION: CPT

## 2025-03-11 PROCEDURE — 84100 ASSAY OF PHOSPHORUS: CPT

## 2025-03-11 PROCEDURE — 85610 PROTHROMBIN TIME: CPT

## 2025-03-11 RX ORDER — POTASSIUM CHLORIDE 1500 MG/1
40 TABLET, EXTENDED RELEASE ORAL ONCE
Status: COMPLETED | OUTPATIENT
Start: 2025-03-11 | End: 2025-03-11

## 2025-03-11 RX ORDER — WARFARIN SODIUM 3 MG/1
3 TABLET ORAL
Status: COMPLETED | OUTPATIENT
Start: 2025-03-11 | End: 2025-03-11

## 2025-03-11 RX ADMIN — Medication 100 MG: at 08:37

## 2025-03-11 RX ADMIN — LACTULOSE 20 G: 10 SOLUTION ORAL at 08:37

## 2025-03-11 RX ADMIN — LORAZEPAM 1 MG: 2 INJECTION INTRAMUSCULAR; INTRAVENOUS at 04:07

## 2025-03-11 RX ADMIN — FOLIC ACID 1 MG: 5 INJECTION, SOLUTION INTRAMUSCULAR; INTRAVENOUS; SUBCUTANEOUS at 11:28

## 2025-03-11 RX ADMIN — BUMETANIDE 2 MG: 1 TABLET ORAL at 08:37

## 2025-03-11 RX ADMIN — SODIUM CHLORIDE, PRESERVATIVE FREE 10 ML: 5 INJECTION INTRAVENOUS at 08:38

## 2025-03-11 RX ADMIN — LORAZEPAM 1 MG: 2 INJECTION INTRAMUSCULAR; INTRAVENOUS at 20:32

## 2025-03-11 RX ADMIN — SODIUM CHLORIDE, PRESERVATIVE FREE 10 ML: 5 INJECTION INTRAVENOUS at 20:32

## 2025-03-11 RX ADMIN — RIFAXIMIN 400 MG: 200 TABLET ORAL at 15:24

## 2025-03-11 RX ADMIN — ARFORMOTEROL TARTRATE 15 MCG: 15 SOLUTION RESPIRATORY (INHALATION) at 20:46

## 2025-03-11 RX ADMIN — MULTIVITAMIN TABLET 1 TABLET: TABLET at 08:37

## 2025-03-11 RX ADMIN — POTASSIUM CHLORIDE 40 MEQ: 1500 TABLET, EXTENDED RELEASE ORAL at 11:28

## 2025-03-11 RX ADMIN — HEPARIN SODIUM 13 UNITS/KG/HR: 10000 INJECTION, SOLUTION INTRAVENOUS at 11:39

## 2025-03-11 RX ADMIN — LACTULOSE 20 G: 10 SOLUTION ORAL at 15:27

## 2025-03-11 RX ADMIN — RIFAXIMIN 400 MG: 200 TABLET ORAL at 20:32

## 2025-03-11 RX ADMIN — PANTOPRAZOLE SODIUM 40 MG: 40 INJECTION, POWDER, FOR SOLUTION INTRAVENOUS at 08:37

## 2025-03-11 RX ADMIN — Medication 400 MG: at 08:37

## 2025-03-11 RX ADMIN — BUDESONIDE INHALATION 500 MCG: 0.5 SUSPENSION RESPIRATORY (INHALATION) at 20:46

## 2025-03-11 RX ADMIN — RIFAXIMIN 400 MG: 200 TABLET ORAL at 08:37

## 2025-03-11 RX ADMIN — WARFARIN SODIUM 3 MG: 3 TABLET ORAL at 20:32

## 2025-03-11 NOTE — CONSULTS
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 (1965) male with a history of HFpEF, most recent EF:  Lab Results   Component Value Date    LVEF 50 02/18/2025    LVEFMODE Echo 02/18/2025           Chart Reviewed:  Diet: ADULT DIET; Dysphagia - Soft and Bite Sized; Low Sodium (2 gm); Mildly Thick (Nectar)  ADULT ORAL NUTRITION SUPPLEMENT; Lunch, Dinner; Frozen Oral Supplement   Daily Weights: Patient Vitals for the past 96 hrs (Last 3 readings):   Weight   03/08/25 0600 102.1 kg (225 lb 1.4 oz)     I/O:   Intake/Output Summary (Last 24 hours) at 3/11/2025 1029  Last data filed at 3/11/2025 0648  Gross per 24 hour   Intake 0 ml   Output 600 ml   Net -600 ml       [] Nursing staff/manager notified of inaccurate garrison weights or I/O        Discharge Plan:  Above identified barriers reviewed and needs addressed    Patient/family educated on daily monitoring tools for CHF, made aware of signs and symptoms of worsening HF and to notify provider immediately of change in symptoms.     Heart Failure Home Instructions placed in patient's discharge instructions    Per AHA guidelines patient to be closely monitored following discharge with 7 day follow up appointment    Scheduling with the CHF clinic New consult to CHF clinic, appointment scheduled    Future Appointments   Date Time Provider Department Center   3/20/2025  8:00 AM [facility] CHF ROOM 3 [facility]       Please refer to consult note this admission 2/20/2025.          Shawnee Tony RN, CHFN   Heart Failure Navigator      
  Palliative Care Department  [phone]  Palliative Care Initial Consult  Provider Lana Taylor, ARNALDO - CNP      PATIENT: Len Dorsey  : 1965  MRN: [id number]  ADMISSION DATE: 2025  4:14 PM  Referring Provider: Chris Yusuf MD     Palliative Medicine was consulted on hospital day 2 for assistance with Goals of care    HPI:     Clinical Summary:Len Dorsey is a 59 y.o. y/o male with a history of alcoholic liver disease, aortic valve replacement, mitral valve replacement, HFrEF 40-45%, stroke, testicular ca s/p chemotherapy, CAD s/p CABG  who presented to [facility] on 2025 with shortness of breath, found hypoxic 78% on room air, placed on BiPAP.  Initial significant laboratory finding showed sodium 126, potassium 2.6, BUN 39, creatinine 2.6.  LA 6.2, proBNP 1241, troponin 214> 199, hemoglobin 7.0 s/p 2 PRBCs, ammonia 46> 108.  GI following, planning EGD once INR are within therapeutic range.  Cardiology following for CHF exacerbation.  Palliative medicine consulted to assist further with goals of care.    ASSESSMENT/PLAN:     Pertinent Hospital Diagnoses     Anemia  Alcoholic cirrhosis  CHF exacerbation  Hepatic encephalopathy  CIWA protocol  Respiratory failure hypoxia      Palliative Care Encounter / Counseling Regarding Goals of Care  Please see detailed goals of care discussion as below  At this time, Len Dorsey, Does Not have capacity for medical decision-making.  Capacity is time limited and situation/question specific  During encounter Leonie was surrogate medical decision-maker  Outcome of goals of care meeting:  Unable to get a hold of POA, left voicemail to call us back    Code status Full Code  Advanced Directives: no POA or living will in epic  Surrogate/Legal NOK:  Leonie Moreland R - Domestic Partner/ [facility] - [phone]     Spiritual assessment: no spiritual distress identified  Bereavement and grief: to be determined  Referrals to: none today    Thank you 
Comprehensive Nutrition Assessment    Type and Reason for Visit:  Consult (TF order/management)    Nutrition Recommendations/Plan:    Pt now intubated & estimated needs were updated  Continue Current Tube Feeding as meets 100% estimated nutrient needs           Estimated Daily Nutrient Needs:  Energy Requirements Based On: Formula  Weight Used for Energy Requirements: Admission  Energy (kcal/day): PS3B 2307; 9259-6118  Weight Used for Protein Requirements: Ideal  Protein (g/day): 105-120 (1.4-1.6 gm/kg IBW)  Method Used for Fluid Requirements: Defer to provider  Fluid (ml/day): per critical care      Henrietta Ferraro, MS, RD, LD  Contact: [phone]    
Consult was sent and has been read  
Department of Internal Medicine  Nephrology Attending Consult Note      Reason for Consult:  hypernatremia and FRANTZ  Requesting Physician:  Magen Shaffer MD     CHIEF COMPLAINT:  shortness of breath    History Obtained From:  patient, electronic medical record    HISTORY OF PRESENT ILLNESS:  Briefly Mr. Dorsey is a 59-year-old man with history of CKD stage IIIa, with large proteinuria, probably due to nephrosclerosis, baseline creatinine 2.1-2.3 mg/dL, HTN, CAD status post CABG, HFpEF 55%, valvular heart disease status post AVR and MVR both mechanical valves, post pacemaker placement, hyperlipidemia, CVA, testicular cancer status postchemotherapy, alcohol abuse, fatty liver, who was admitted on who was admitted on 2/17/2025 for shortness of breath.  Patient was transferred to the ICU on 2/21/2025 for increased agitation and alcohol withdrawal and was subsequently intubated on 2/25/2025.  Patient was extubated on 3/5/2025 and transferred to the floor on 3/6/2025.  Patient has been hyponatremic throughout this entire admission and was managed by ICU, sodium 153, reason for this consultation.    Past Medical History:        Diagnosis Date    Alcoholic liver disease     H/O [facility] 09/[facility]  Mitral and pacemaker at this time as well    History of mitral valve replacement 09/2020    [facility]  Aortic Valve placed at this time as well as pacemaker    Stroke (HCC)     Testicular cancer (HCC)     in remission since 1988     Past Surgical History:        Procedure Laterality Date    COLONOSCOPY N/A 8/24/2022    COLONOSCOPY POLYPECTOMY HOT BIOPSY performed by Rene Lorenzo [place] [facility] [place]    FRACTURE SURGERY      Left Tibial Plateau Fracture 3/29/2022     PACEMAKER PLACEMENT  09/2020    UPPER GASTROINTESTINAL ENDOSCOPY N/A 8/24/2022    EGD DIAGNOSTIC ONLY performed by Rene Lorenzo MD [place] [facility]    UPPER GASTROINTESTINAL ENDOSCOPY N/A 5/9/2024    
Department of Internal Medicine  Nephrology Attending progress Note      Reason for Consult:  hypernatremia and FRANTZ  Requesting Physician:  Magen Shaffer MD     CHIEF COMPLAINT:  shortness of breath    History Obtained From:  patient, electronic medical record    HISTORY OF PRESENT ILLNESS:  Briefly Mr. Dorsey is a 59-year-old man with history of CKD stage IIIa, with large proteinuria, probably due to nephrosclerosis, baseline creatinine 2.1-2.3 mg/dL, HTN, CAD status post CABG, HFpEF 55%, valvular heart disease status post AVR and MVR both mechanical valves, post pacemaker placement, hyperlipidemia, CVA, testicular cancer status postchemotherapy, alcohol abuse, fatty liver, who was admitted on who was admitted on 2/17/2025 for shortness of breath.  Patient was transferred to the ICU on 2/21/2025 for increased agitation and alcohol withdrawal and was subsequently intubated on 2/25/2025.  Patient was extubated on 3/5/2025 and transferred to the floor on 3/6/2025.  Patient has been hyponatremic throughout this entire admission and was managed by ICU, sodium 153, reason for this consultation.    Past Medical History:        Diagnosis Date    Alcoholic liver disease     H/O prosthetic aortic valve replacement 09/2020    [facility]  Mitral and pacemaker at this time as well    History of mitral valve replacement 09/2020    at [facility]  Aortic Valve placed at this time as well as pacemaker    Stroke (HCC)     Testicular cancer (HCC)     in remission since 1988     Past Surgical History:        Procedure Laterality Date    COLONOSCOPY N/A 8/24/2022    COLONOSCOPY POLYPECTOMY HOT BIOPSY performed by Rene Lorenzo MD at [facility] ENDOSCOPY    FRACTURE SURGERY      Left Tibial Plateau Fracture 3/29/2022     PACEMAKER PLACEMENT  09/2020    UPPER GASTROINTESTINAL ENDOSCOPY N/A 8/24/2022    EGD DIAGNOSTIC ONLY performed by Rene Lorenzo MD at [facility] ENDOSCOPY    UPPER GASTROINTESTINAL ENDOSCOPY N/A 5/9/2024    
ESOPHAGOGASTRODUODENOSCOPY          +++AVAIL 1430+++ performed by Min Mcdaniels DO at [facility] ENDOSCOPY    UPPER GASTROINTESTINAL ENDOSCOPY N/A 2/28/2025    ESOPHAGOGASTRODUODENOSCOPY BIOPSY performed by Herbert Wells MD at [facility] ENDOSCOPY     Current Medications:    Current Facility-Administered Medications: ipratropium 0.5 mg-albuterol 2.5 mg (DUONEB) nebulizer solution 1 Dose, 1 Dose, Inhalation, Q4H PRN  dextrose 5 % solution, , IntraVENous, Continuous  sodium chloride flush 0.9 % injection 5-40 mL, 5-40 mL, IntraVENous, 2 times per day  sodium chloride flush 0.9 % injection 5-40 mL, 5-40 mL, IntraVENous, PRN  0.9 % sodium chloride infusion, , IntraVENous, PRN  lidocaine PF 1 % injection 50 mg, 50 mg, IntraDERmal, Once  folic acid injection 1 mg, 1 mg, IntraVENous, Daily  multivitamin 1 tablet, 1 tablet, Oral, Daily  ceFEPIme (MAXIPIME) 2,000 mg in sodium chloride 0.9 % 100 mL IVPB ([id number]), 2,000 mg, IntraVENous, Q12H  bumetanide (BUMEX) injection 2 mg, 2 mg, IntraVENous, BID  pantoprazole (PROTONIX) 40 mg in sodium chloride (PF) 0.9 % 10 mL injection, 40 mg, IntraVENous, Daily  lactulose (CHRONULAC) 10 GM/15ML solution 20 g, 20 g, Per NG tube, TID  magnesium oxide (MAG-OX) tablet 400 mg, 400 mg, Per NG tube, Daily  rifAXIMin (XIFAXAN) tablet 400 mg, 400 mg, Per NG tube, TID  acetaminophen (TYLENOL) tablet 650 mg, 650 mg, Per NG tube, Q6H PRN **OR** acetaminophen (TYLENOL) suppository 650 mg, 650 mg, Rectal, Q6H PRN  polyethylene glycol (GLYCOLAX) packet 17 g, 17 g, Per NG tube, Daily PRN  warfarin placeholder: dosing by pharmacy, , Oral, RX Placeholder  lubrifresh P.M. (artificial tears) ophthalmic ointment, , Both Eyes, PRN  [Held by provider] metoprolol succinate (TOPROL XL) extended release tablet 25 mg, 25 mg, Oral, Daily  arformoterol tartrate (BROVANA) nebulizer solution 15 mcg, 15 mcg, Nebulization, BID RT  budesonide (PULMICORT) nebulizer suspension 500 mcg, 0.5 mg, Nebulization, BID RT  sodium 
and pacemaker at this time as well    History of mitral valve replacement 09/2020    at [facility]  Aortic Valve placed at this time as well as pacemaker    Stroke (HCC)     Testicular cancer (HCC)     in remission since 1988       Past Surgical History:        Procedure Laterality Date    COLONOSCOPY N/A 8/24/2022    COLONOSCOPY POLYPECTOMY HOT BIOPSY performed by Rene Lorenzo MD at [facility] ENDOSCOPY    FRACTURE SURGERY      Left Tibial Plateau Fracture 3/29/2022     PACEMAKER PLACEMENT  09/2020    UPPER GASTROINTESTINAL ENDOSCOPY N/A 8/24/2022    EGD DIAGNOSTIC ONLY performed by Rene Lorenzo MD at [facility] ENDOSCOPY    UPPER GASTROINTESTINAL ENDOSCOPY N/A 5/9/2024    ESOPHAGOGASTRODUODENOSCOPY          +++AVAIL 1430+++ performed by Min Mcdaniels DO at [facility] ENDOSCOPY       Medications Prior to Admission:    Prior to Admission medications    Medication Sig Start Date End Date Taking? Authorizing Provider   warfarin (COUMADIN) 1 MG tablet Take 1 tablet once daily 8/17/24   Peter Olivo MD   bumetanide (BUMEX) 2 MG tablet Take 1 tablet by mouth daily  Patient not taking: Reported on 2/18/2025 8/18/24   Peter Olivo MD   tamsulosin (FLOMAX) 0.4 MG capsule Take 1 capsule by mouth at bedtime  Patient not taking: Reported on 2/18/2025 8/17/24   Peter Olivo MD   busPIRone (BUSPAR) 5 MG tablet Take 1 tablet by mouth 3 times daily  Patient not taking: Reported on 2/18/2025 5/19/24   Uzma Burgos MD   folic acid (FOLVITE) 1 MG tablet Take 1 tablet by mouth daily  Patient not taking: Reported on 2/18/2025 5/20/24   Uzma Burgos MD   isosorbide dinitrate (ISORDIL) 10 MG tablet Take 1 tablet by mouth 3 times daily  Patient not taking: Reported on 2/18/2025 5/19/24   Uzma Burgos MD   midodrine (PROAMATINE) 5 MG tablet Take 1 tablet by mouth 3 times daily (with meals)  Patient not taking: Reported on 2/18/2025 5/19/24   Uzma Burgos MD   mirtazapine (REMERON) 7.5 MG tablet Take 1 tablet by 
or I/O        Discharge Plan:  Above identified barriers reviewed and needs addressed    Patient/family educated on daily monitoring tools for CHF, made aware of signs and symptoms of worsening HF and to notify provider immediately of change in symptoms.     Heart Failure Home Instructions placed in patient's discharge instructions    Per AHA guidelines patient to be closely monitored following discharge with 7 day follow up appointment    Scheduling with the CHF clinic New consult to CHF clinic, appointment scheduled    Future Appointments   Date Time Provider Department Center   3/5/2025 12:00 PM [facility]           Patient Education:  Self Monitoring/management:  Reviewed the introduction to Heart Failure, the HF zones, signs and symptoms to report on day 1 of onset, medications, medication compliance, the importance of obtaining daily weights, following a low sodium diet, reading food labels for the sodium content, keeping physician appointments, and smoking cessation.  Discussed writing / tracking daily weights on a calendar / log because a 5 pound gain in 1 week can sneak up if you are not tracking it.   Advised patient they can reduce the risk for Heart Failure exacerbations by modifying / controlling the risk factors.  Discussed self-managed care which includes the following: take medications as prescribed, report any intolerable side effects of medications to the medical provider (PCP or cardiologist), do not just stop taking the medication; follow a cardiac heart healthy / low sodium diet; weigh yourself daily, exercise regularly- per doctor recommendation and not to smoke or use an excess amount of alcohol.  Discussed calling the cardiologist / doctor with a weight gain of 3 pounds in one day or a total of 5 pounds or more in one week. Also, if you should have a significant weight loss of 3# or more in one day to call the doctor, they may need to decrease or hold the diuretic 
daily and transfuse to Hgb >7.   - Consider IV iron.  - Expect Hgb to improve once INR normalizes.     3. CHF Exacerbation:  - Cardiology has been consulted and is following.  - Defer diuresis to their service.   - Patient with FRANTZ but possibly cardiorenal.     4. Hepatic Encephalopathy:  - Continue home lactulose and Rifaximin.     We will follow closely.    Patient seen and examined separately and discussed w/ CHIP Flores. Agree w/ history, physical exam, and assessment/plan as described above. Note changes reflected above.    Thank you for including us in the care of this patient. Please do not hesitate to contact us with any additional questions or concerns.    Herbert Wells MD  Gastroenterology/Hepatology  Advanced Endoscopy        
hypertensive gastropathy.  Mild-moderate duodenitis.  No AVMs.octreotide drip. protonix to BID x 8 weeks then daily. No NSAID's Recommend outpt capsule endoscopy.   Left breast mass with negative ultrasound  Colon polyps / tubular adenoma colonoscopy 8/24/2022   Testicular cancer status post surgery and chemotherapy 1988 in [place]  Obese BMI 33.7  Vitamin D deficiency  Depression / Anxiety   Noncompliance      Available cardiac testing  Cardiac catheterizations:  9/11/2020 [facility] JOSE LAD mid vessel 95% stenosis. LCx posterolateral extension 70% stenosis   CAD s/p CABG LIMA  to LAD at same time as valve replacement 9/14/2020     [facility] 3/8/2023 [facility]:  Coronary Angiography:  The coronary circulation is left dominant.    Left Main Coronary Artery:  The left main coronary artery is a normal caliber vessel. The left main arises normally from the left coronary sinus of Valsalva and bifurcates into the LAD and circumflex coronary arteries. The left main coronary artery showed no significant disease or stenosis greater than 30%.    Left Anterior Descending Coronary Artery Distribution:  The left anterior descending coronary artery is a normal caliber vessel. The LAD arises normally from the left main coronary artery. The LAD demonstrated severe atherosclerotic disease. The mid to distal left anterior descending coronary artery showed 85% stenosis. This lesion was eccentric.    Circumflex Coronary Artery Distribution:  The circumflex coronary artery is a normal caliber vessel. The circumflex arises normally from the left main coronary artery and terminates in the AV groove. The circumflex revealed mild atherosclerotic disease.    Right Coronary Artery Distribution:    The right coronary artery was visualized using a non-selective injection. The right coronary artery is a normal caliber vessel. The RCA arises normally from the right sinus of Valsalva. The RCA showed no significant disease or stenosis greater

## 2025-03-12 LAB
ALBUMIN SERPL-MCNC: 3.6 G/DL (ref 3.5–5.2)
ALP SERPL-CCNC: 237 U/L (ref 40–129)
ALT SERPL-CCNC: 28 U/L (ref 0–40)
ANION GAP SERPL CALCULATED.3IONS-SCNC: 17 MMOL/L (ref 7–16)
AST SERPL-CCNC: 57 U/L (ref 0–39)
BASOPHILS # BLD: 0.05 K/UL (ref 0–0.2)
BASOPHILS NFR BLD: 1 % (ref 0–2)
BILIRUB SERPL-MCNC: 1.7 MG/DL (ref 0–1.2)
BUN SERPL-MCNC: 33 MG/DL (ref 6–20)
CALCIUM SERPL-MCNC: 9.1 MG/DL (ref 8.6–10.2)
CHLORIDE SERPL-SCNC: 100 MMOL/L (ref 98–107)
CO2 SERPL-SCNC: 23 MMOL/L (ref 22–29)
CREAT SERPL-MCNC: 2.1 MG/DL (ref 0.7–1.2)
EOSINOPHIL # BLD: 0.41 K/UL (ref 0.05–0.5)
EOSINOPHILS RELATIVE PERCENT: 5 % (ref 0–6)
ERYTHROCYTE [DISTWIDTH] IN BLOOD BY AUTOMATED COUNT: 20 % (ref 11.5–15)
GFR, ESTIMATED: 36 ML/MIN/1.73M2
GLUCOSE SERPL-MCNC: 102 MG/DL (ref 74–99)
HCT VFR BLD AUTO: 31 % (ref 37–54)
HGB BLD-MCNC: 9.5 G/DL (ref 12.5–16.5)
IMM GRANULOCYTES # BLD AUTO: 0.19 K/UL (ref 0–0.58)
IMM GRANULOCYTES NFR BLD: 2 % (ref 0–5)
INR PPP: 1.8
LYMPHOCYTES NFR BLD: 1.44 K/UL (ref 1.5–4)
LYMPHOCYTES RELATIVE PERCENT: 16 % (ref 20–42)
MAGNESIUM SERPL-MCNC: 1.9 MG/DL (ref 1.6–2.6)
MCH RBC QN AUTO: 30.6 PG (ref 26–35)
MCHC RBC AUTO-ENTMCNC: 30.6 G/DL (ref 32–34.5)
MCV RBC AUTO: 100 FL (ref 80–99.9)
MICROORGANISM SPEC CULT: ABNORMAL
MICROORGANISM/AGENT SPEC: ABNORMAL
MONOCYTES NFR BLD: 0.97 K/UL (ref 0.1–0.95)
MONOCYTES NFR BLD: 11 % (ref 2–12)
NEUTROPHILS NFR BLD: 67 % (ref 43–80)
NEUTS SEG NFR BLD: 6.09 K/UL (ref 1.8–7.3)
PARTIAL THROMBOPLASTIN TIME: 79.2 SEC (ref 24.5–35.1)
PHOSPHATE SERPL-MCNC: 2.8 MG/DL (ref 2.5–4.5)
PLATELET # BLD AUTO: 140 K/UL (ref 130–450)
PMV BLD AUTO: 12.8 FL (ref 7–12)
POTASSIUM SERPL-SCNC: 3.5 MMOL/L (ref 3.5–5)
PROT SERPL-MCNC: 7.3 G/DL (ref 6.4–8.3)
PROTHROMBIN TIME: 20.1 SEC (ref 9.3–12.4)
RBC # BLD AUTO: 3.1 M/UL (ref 3.8–5.8)
SODIUM SERPL-SCNC: 140 MMOL/L (ref 132–146)
SPECIMEN DESCRIPTION: ABNORMAL
WBC OTHER # BLD: 9.2 K/UL (ref 4.5–11.5)

## 2025-03-12 PROCEDURE — 1200000000 HC SEMI PRIVATE

## 2025-03-12 PROCEDURE — 2580000003 HC RX 258

## 2025-03-12 PROCEDURE — 6370000000 HC RX 637 (ALT 250 FOR IP)

## 2025-03-12 PROCEDURE — 84100 ASSAY OF PHOSPHORUS: CPT

## 2025-03-12 PROCEDURE — 2500000003 HC RX 250 WO HCPCS

## 2025-03-12 PROCEDURE — 99232 SBSQ HOSP IP/OBS MODERATE 35: CPT | Performed by: INTERNAL MEDICINE

## 2025-03-12 PROCEDURE — 36415 COLL VENOUS BLD VENIPUNCTURE: CPT

## 2025-03-12 PROCEDURE — 6360000002 HC RX W HCPCS

## 2025-03-12 PROCEDURE — 99232 SBSQ HOSP IP/OBS MODERATE 35: CPT | Performed by: NURSE PRACTITIONER

## 2025-03-12 PROCEDURE — 85610 PROTHROMBIN TIME: CPT

## 2025-03-12 PROCEDURE — 6370000000 HC RX 637 (ALT 250 FOR IP): Performed by: INTERNAL MEDICINE

## 2025-03-12 PROCEDURE — 80053 COMPREHEN METABOLIC PANEL: CPT

## 2025-03-12 PROCEDURE — 85730 THROMBOPLASTIN TIME PARTIAL: CPT

## 2025-03-12 PROCEDURE — 92526 ORAL FUNCTION THERAPY: CPT

## 2025-03-12 PROCEDURE — 85025 COMPLETE CBC W/AUTO DIFF WBC: CPT

## 2025-03-12 PROCEDURE — 83735 ASSAY OF MAGNESIUM: CPT

## 2025-03-12 PROCEDURE — 94640 AIRWAY INHALATION TREATMENT: CPT

## 2025-03-12 PROCEDURE — 6360000002 HC RX W HCPCS: Performed by: INTERNAL MEDICINE

## 2025-03-12 RX ORDER — WARFARIN SODIUM 3 MG/1
3 TABLET ORAL
Status: COMPLETED | OUTPATIENT
Start: 2025-03-12 | End: 2025-03-12

## 2025-03-12 RX ORDER — HYDROXYZINE HYDROCHLORIDE 50 MG/ML
50 INJECTION, SOLUTION INTRAMUSCULAR ONCE
Status: DISCONTINUED | OUTPATIENT
Start: 2025-03-12 | End: 2025-03-17 | Stop reason: HOSPADM

## 2025-03-12 RX ADMIN — LACTULOSE 20 G: 10 SOLUTION ORAL at 10:13

## 2025-03-12 RX ADMIN — BUMETANIDE 2 MG: 1 TABLET ORAL at 10:10

## 2025-03-12 RX ADMIN — RIFAXIMIN 400 MG: 200 TABLET ORAL at 10:10

## 2025-03-12 RX ADMIN — RIFAXIMIN 400 MG: 200 TABLET ORAL at 20:40

## 2025-03-12 RX ADMIN — Medication 100 MG: at 10:11

## 2025-03-12 RX ADMIN — Medication 400 MG: at 10:11

## 2025-03-12 RX ADMIN — BUDESONIDE INHALATION 500 MCG: 0.5 SUSPENSION RESPIRATORY (INHALATION) at 21:33

## 2025-03-12 RX ADMIN — LORAZEPAM 1 MG: 2 INJECTION INTRAMUSCULAR; INTRAVENOUS at 21:00

## 2025-03-12 RX ADMIN — HEPARIN SODIUM 13 UNITS/KG/HR: 10000 INJECTION, SOLUTION INTRAVENOUS at 07:29

## 2025-03-12 RX ADMIN — SODIUM CHLORIDE, PRESERVATIVE FREE 10 ML: 5 INJECTION INTRAVENOUS at 20:41

## 2025-03-12 RX ADMIN — PANTOPRAZOLE SODIUM 40 MG: 40 INJECTION, POWDER, FOR SOLUTION INTRAVENOUS at 10:09

## 2025-03-12 RX ADMIN — FOLIC ACID 1 MG: 5 INJECTION, SOLUTION INTRAMUSCULAR; INTRAVENOUS; SUBCUTANEOUS at 10:07

## 2025-03-12 RX ADMIN — WARFARIN SODIUM 3 MG: 3 TABLET ORAL at 19:00

## 2025-03-12 RX ADMIN — ARFORMOTEROL TARTRATE 15 MCG: 15 SOLUTION RESPIRATORY (INHALATION) at 07:46

## 2025-03-12 RX ADMIN — MULTIVITAMIN TABLET 1 TABLET: TABLET at 10:11

## 2025-03-12 RX ADMIN — SODIUM CHLORIDE, PRESERVATIVE FREE 10 ML: 5 INJECTION INTRAVENOUS at 20:40

## 2025-03-12 RX ADMIN — RIFAXIMIN 400 MG: 200 TABLET ORAL at 14:20

## 2025-03-12 RX ADMIN — ARFORMOTEROL TARTRATE 15 MCG: 15 SOLUTION RESPIRATORY (INHALATION) at 21:33

## 2025-03-12 RX ADMIN — BUDESONIDE INHALATION 500 MCG: 0.5 SUSPENSION RESPIRATORY (INHALATION) at 07:46

## 2025-03-12 RX ADMIN — SODIUM CHLORIDE, PRESERVATIVE FREE 10 ML: 5 INJECTION INTRAVENOUS at 10:12

## 2025-03-13 LAB
ALBUMIN SERPL-MCNC: 3.8 G/DL (ref 3.5–5.2)
ALP SERPL-CCNC: 250 U/L (ref 40–129)
ALT SERPL-CCNC: 34 U/L (ref 0–40)
ANION GAP SERPL CALCULATED.3IONS-SCNC: 17 MMOL/L (ref 7–16)
AST SERPL-CCNC: 70 U/L (ref 0–39)
BASOPHILS # BLD: 0.04 K/UL (ref 0–0.2)
BASOPHILS NFR BLD: 1 % (ref 0–2)
BILIRUB SERPL-MCNC: 1.8 MG/DL (ref 0–1.2)
BUN SERPL-MCNC: 32 MG/DL (ref 6–20)
CALCIUM SERPL-MCNC: 9.6 MG/DL (ref 8.6–10.2)
CHLORIDE SERPL-SCNC: 101 MMOL/L (ref 98–107)
CO2 SERPL-SCNC: 23 MMOL/L (ref 22–29)
CREAT SERPL-MCNC: 1.9 MG/DL (ref 0.7–1.2)
EOSINOPHIL # BLD: 0.37 K/UL (ref 0.05–0.5)
EOSINOPHILS RELATIVE PERCENT: 5 % (ref 0–6)
ERYTHROCYTE [DISTWIDTH] IN BLOOD BY AUTOMATED COUNT: 19.8 % (ref 11.5–15)
GFR, ESTIMATED: 41 ML/MIN/1.73M2
GLUCOSE SERPL-MCNC: 109 MG/DL (ref 74–99)
HCT VFR BLD AUTO: 31 % (ref 37–54)
HGB BLD-MCNC: 9.7 G/DL (ref 12.5–16.5)
IMM GRANULOCYTES # BLD AUTO: 0.08 K/UL (ref 0–0.58)
IMM GRANULOCYTES NFR BLD: 1 % (ref 0–5)
INR PPP: 1.7
LYMPHOCYTES NFR BLD: 1.12 K/UL (ref 1.5–4)
LYMPHOCYTES RELATIVE PERCENT: 14 % (ref 20–42)
MAGNESIUM SERPL-MCNC: 1.8 MG/DL (ref 1.6–2.6)
MCH RBC QN AUTO: 30.8 PG (ref 26–35)
MCHC RBC AUTO-ENTMCNC: 31.3 G/DL (ref 32–34.5)
MCV RBC AUTO: 98.4 FL (ref 80–99.9)
MONOCYTES NFR BLD: 1.12 K/UL (ref 0.1–0.95)
MONOCYTES NFR BLD: 14 % (ref 2–12)
NEUTROPHILS NFR BLD: 66 % (ref 43–80)
NEUTS SEG NFR BLD: 5.27 K/UL (ref 1.8–7.3)
PARTIAL THROMBOPLASTIN TIME: 70.7 SEC (ref 24.5–35.1)
PARTIAL THROMBOPLASTIN TIME: 89.4 SEC (ref 24.5–35.1)
PHOSPHATE SERPL-MCNC: 3.2 MG/DL (ref 2.5–4.5)
PLATELET # BLD AUTO: 139 K/UL (ref 130–450)
PMV BLD AUTO: 12.7 FL (ref 7–12)
POTASSIUM SERPL-SCNC: 3.7 MMOL/L (ref 3.5–5)
PROT SERPL-MCNC: 7.5 G/DL (ref 6.4–8.3)
PROTHROMBIN TIME: 19 SEC (ref 9.3–12.4)
RBC # BLD AUTO: 3.15 M/UL (ref 3.8–5.8)
SODIUM SERPL-SCNC: 141 MMOL/L (ref 132–146)
WBC OTHER # BLD: 8 K/UL (ref 4.5–11.5)

## 2025-03-13 PROCEDURE — 99232 SBSQ HOSP IP/OBS MODERATE 35: CPT | Performed by: INTERNAL MEDICINE

## 2025-03-13 PROCEDURE — 84100 ASSAY OF PHOSPHORUS: CPT

## 2025-03-13 PROCEDURE — 6370000000 HC RX 637 (ALT 250 FOR IP): Performed by: INTERNAL MEDICINE

## 2025-03-13 PROCEDURE — 85025 COMPLETE CBC W/AUTO DIFF WBC: CPT

## 2025-03-13 PROCEDURE — 6360000002 HC RX W HCPCS

## 2025-03-13 PROCEDURE — 80053 COMPREHEN METABOLIC PANEL: CPT

## 2025-03-13 PROCEDURE — 2500000003 HC RX 250 WO HCPCS

## 2025-03-13 PROCEDURE — 2580000003 HC RX 258

## 2025-03-13 PROCEDURE — 83735 ASSAY OF MAGNESIUM: CPT

## 2025-03-13 PROCEDURE — 6370000000 HC RX 637 (ALT 250 FOR IP)

## 2025-03-13 PROCEDURE — 92526 ORAL FUNCTION THERAPY: CPT

## 2025-03-13 PROCEDURE — 36415 COLL VENOUS BLD VENIPUNCTURE: CPT

## 2025-03-13 PROCEDURE — 99232 SBSQ HOSP IP/OBS MODERATE 35: CPT | Performed by: NURSE PRACTITIONER

## 2025-03-13 PROCEDURE — 94640 AIRWAY INHALATION TREATMENT: CPT

## 2025-03-13 PROCEDURE — 1200000000 HC SEMI PRIVATE

## 2025-03-13 PROCEDURE — 85730 THROMBOPLASTIN TIME PARTIAL: CPT

## 2025-03-13 PROCEDURE — 85610 PROTHROMBIN TIME: CPT

## 2025-03-13 PROCEDURE — 6360000002 HC RX W HCPCS: Performed by: INTERNAL MEDICINE

## 2025-03-13 RX ORDER — WARFARIN SODIUM 5 MG/1
5 TABLET ORAL
Status: COMPLETED | OUTPATIENT
Start: 2025-03-13 | End: 2025-03-13

## 2025-03-13 RX ADMIN — RIFAXIMIN 400 MG: 200 TABLET ORAL at 08:19

## 2025-03-13 RX ADMIN — RIFAXIMIN 400 MG: 200 TABLET ORAL at 13:58

## 2025-03-13 RX ADMIN — LACTULOSE 20 G: 10 SOLUTION ORAL at 08:19

## 2025-03-13 RX ADMIN — MULTIVITAMIN TABLET 1 TABLET: TABLET at 08:19

## 2025-03-13 RX ADMIN — ARFORMOTEROL TARTRATE 15 MCG: 15 SOLUTION RESPIRATORY (INHALATION) at 09:20

## 2025-03-13 RX ADMIN — BUDESONIDE INHALATION 500 MCG: 0.5 SUSPENSION RESPIRATORY (INHALATION) at 09:20

## 2025-03-13 RX ADMIN — HEPARIN SODIUM 11 UNITS/KG/HR: 10000 INJECTION, SOLUTION INTRAVENOUS at 22:44

## 2025-03-13 RX ADMIN — Medication 400 MG: at 08:19

## 2025-03-13 RX ADMIN — BUDESONIDE INHALATION 500 MCG: 0.5 SUSPENSION RESPIRATORY (INHALATION) at 20:33

## 2025-03-13 RX ADMIN — ARFORMOTEROL TARTRATE 15 MCG: 15 SOLUTION RESPIRATORY (INHALATION) at 20:33

## 2025-03-13 RX ADMIN — RIFAXIMIN 400 MG: 200 TABLET ORAL at 20:57

## 2025-03-13 RX ADMIN — FOLIC ACID 1 MG: 5 INJECTION, SOLUTION INTRAMUSCULAR; INTRAVENOUS; SUBCUTANEOUS at 08:54

## 2025-03-13 RX ADMIN — PANTOPRAZOLE SODIUM 40 MG: 40 INJECTION, POWDER, FOR SOLUTION INTRAVENOUS at 08:19

## 2025-03-13 RX ADMIN — SODIUM CHLORIDE, PRESERVATIVE FREE 10 ML: 5 INJECTION INTRAVENOUS at 20:58

## 2025-03-13 RX ADMIN — LORAZEPAM 1 MG: 2 INJECTION INTRAMUSCULAR; INTRAVENOUS at 23:29

## 2025-03-13 RX ADMIN — WARFARIN SODIUM 5 MG: 5 TABLET ORAL at 18:18

## 2025-03-13 RX ADMIN — BUMETANIDE 2 MG: 1 TABLET ORAL at 08:19

## 2025-03-13 RX ADMIN — HEPARIN SODIUM 13 UNITS/KG/HR: 10000 INJECTION, SOLUTION INTRAVENOUS at 01:32

## 2025-03-13 RX ADMIN — LACTULOSE 20 G: 10 SOLUTION ORAL at 20:58

## 2025-03-13 RX ADMIN — SODIUM CHLORIDE, PRESERVATIVE FREE 10 ML: 5 INJECTION INTRAVENOUS at 08:20

## 2025-03-13 RX ADMIN — Medication 100 MG: at 08:19

## 2025-03-14 LAB
ALBUMIN SERPL-MCNC: 3.7 G/DL (ref 3.5–5.2)
ALP SERPL-CCNC: 264 U/L (ref 40–129)
ALT SERPL-CCNC: 35 U/L (ref 0–40)
ANION GAP SERPL CALCULATED.3IONS-SCNC: 20 MMOL/L (ref 7–16)
AST SERPL-CCNC: 74 U/L (ref 0–39)
BASOPHILS # BLD: 0.02 K/UL (ref 0–0.2)
BASOPHILS NFR BLD: 0 % (ref 0–2)
BILIRUB SERPL-MCNC: 1.7 MG/DL (ref 0–1.2)
BUN SERPL-MCNC: 34 MG/DL (ref 6–20)
CALCIUM SERPL-MCNC: 9.8 MG/DL (ref 8.6–10.2)
CHLORIDE SERPL-SCNC: 101 MMOL/L (ref 98–107)
CO2 SERPL-SCNC: 20 MMOL/L (ref 22–29)
CREAT SERPL-MCNC: 2 MG/DL (ref 0.7–1.2)
EOSINOPHIL # BLD: 0.34 K/UL (ref 0.05–0.5)
EOSINOPHILS RELATIVE PERCENT: 5 % (ref 0–6)
ERYTHROCYTE [DISTWIDTH] IN BLOOD BY AUTOMATED COUNT: 19.7 % (ref 11.5–15)
GFR, ESTIMATED: 38 ML/MIN/1.73M2
GLUCOSE SERPL-MCNC: 94 MG/DL (ref 74–99)
HCT VFR BLD AUTO: 35.1 % (ref 37–54)
HGB BLD-MCNC: 10.7 G/DL (ref 12.5–16.5)
IMM GRANULOCYTES # BLD AUTO: 0.05 K/UL (ref 0–0.58)
IMM GRANULOCYTES NFR BLD: 1 % (ref 0–5)
INR PPP: 1.7
LYMPHOCYTES NFR BLD: 0.96 K/UL (ref 1.5–4)
LYMPHOCYTES RELATIVE PERCENT: 15 % (ref 20–42)
MAGNESIUM SERPL-MCNC: 1.9 MG/DL (ref 1.6–2.6)
MCH RBC QN AUTO: 30.8 PG (ref 26–35)
MCHC RBC AUTO-ENTMCNC: 30.5 G/DL (ref 32–34.5)
MCV RBC AUTO: 101.2 FL (ref 80–99.9)
MONOCYTES NFR BLD: 0.72 K/UL (ref 0.1–0.95)
MONOCYTES NFR BLD: 11 % (ref 2–12)
NEUTROPHILS NFR BLD: 67 % (ref 43–80)
NEUTS SEG NFR BLD: 4.3 K/UL (ref 1.8–7.3)
PARTIAL THROMBOPLASTIN TIME: 49.5 SEC (ref 24.5–35.1)
PARTIAL THROMBOPLASTIN TIME: 58.3 SEC (ref 24.5–35.1)
PHOSPHATE SERPL-MCNC: 4.2 MG/DL (ref 2.5–4.5)
PLATELET, FLUORESCENCE: 119 K/UL (ref 130–450)
PMV BLD AUTO: 13.3 FL (ref 7–12)
POTASSIUM SERPL-SCNC: 3.8 MMOL/L (ref 3.5–5)
PROT SERPL-MCNC: 8 G/DL (ref 6.4–8.3)
PROTHROMBIN TIME: 18.9 SEC (ref 9.3–12.4)
RBC # BLD AUTO: 3.47 M/UL (ref 3.8–5.8)
SODIUM SERPL-SCNC: 141 MMOL/L (ref 132–146)
WBC OTHER # BLD: 6.4 K/UL (ref 4.5–11.5)

## 2025-03-14 PROCEDURE — 36592 COLLECT BLOOD FROM PICC: CPT

## 2025-03-14 PROCEDURE — 6360000002 HC RX W HCPCS

## 2025-03-14 PROCEDURE — 6370000000 HC RX 637 (ALT 250 FOR IP)

## 2025-03-14 PROCEDURE — 6370000000 HC RX 637 (ALT 250 FOR IP): Performed by: INTERNAL MEDICINE

## 2025-03-14 PROCEDURE — 2580000003 HC RX 258

## 2025-03-14 PROCEDURE — 2500000003 HC RX 250 WO HCPCS

## 2025-03-14 PROCEDURE — 84100 ASSAY OF PHOSPHORUS: CPT

## 2025-03-14 PROCEDURE — 80053 COMPREHEN METABOLIC PANEL: CPT

## 2025-03-14 PROCEDURE — 85610 PROTHROMBIN TIME: CPT

## 2025-03-14 PROCEDURE — 83735 ASSAY OF MAGNESIUM: CPT

## 2025-03-14 PROCEDURE — 99232 SBSQ HOSP IP/OBS MODERATE 35: CPT | Performed by: INTERNAL MEDICINE

## 2025-03-14 PROCEDURE — 85025 COMPLETE CBC W/AUTO DIFF WBC: CPT

## 2025-03-14 PROCEDURE — 85730 THROMBOPLASTIN TIME PARTIAL: CPT

## 2025-03-14 PROCEDURE — 97530 THERAPEUTIC ACTIVITIES: CPT

## 2025-03-14 PROCEDURE — 94640 AIRWAY INHALATION TREATMENT: CPT

## 2025-03-14 PROCEDURE — 36415 COLL VENOUS BLD VENIPUNCTURE: CPT

## 2025-03-14 PROCEDURE — 1200000000 HC SEMI PRIVATE

## 2025-03-14 PROCEDURE — 99232 SBSQ HOSP IP/OBS MODERATE 35: CPT | Performed by: NURSE PRACTITIONER

## 2025-03-14 RX ORDER — WARFARIN SODIUM 5 MG/1
5 TABLET ORAL
Status: COMPLETED | OUTPATIENT
Start: 2025-03-14 | End: 2025-03-14

## 2025-03-14 RX ADMIN — RIFAXIMIN 400 MG: 200 TABLET ORAL at 22:27

## 2025-03-14 RX ADMIN — FOLIC ACID 1 MG: 5 INJECTION, SOLUTION INTRAMUSCULAR; INTRAVENOUS; SUBCUTANEOUS at 08:53

## 2025-03-14 RX ADMIN — SODIUM CHLORIDE, PRESERVATIVE FREE 10 ML: 5 INJECTION INTRAVENOUS at 22:29

## 2025-03-14 RX ADMIN — Medication 100 MG: at 08:53

## 2025-03-14 RX ADMIN — HEPARIN SODIUM 11 UNITS/KG/HR: 10000 INJECTION, SOLUTION INTRAVENOUS at 22:26

## 2025-03-14 RX ADMIN — BUDESONIDE INHALATION 500 MCG: 0.5 SUSPENSION RESPIRATORY (INHALATION) at 09:25

## 2025-03-14 RX ADMIN — Medication 400 MG: at 08:53

## 2025-03-14 RX ADMIN — ARFORMOTEROL TARTRATE 15 MCG: 15 SOLUTION RESPIRATORY (INHALATION) at 09:25

## 2025-03-14 RX ADMIN — RIFAXIMIN 400 MG: 200 TABLET ORAL at 13:28

## 2025-03-14 RX ADMIN — WARFARIN SODIUM 5 MG: 5 TABLET ORAL at 18:28

## 2025-03-14 RX ADMIN — PANTOPRAZOLE SODIUM 40 MG: 40 INJECTION, POWDER, FOR SOLUTION INTRAVENOUS at 08:54

## 2025-03-14 RX ADMIN — MULTIVITAMIN TABLET 1 TABLET: TABLET at 08:53

## 2025-03-14 RX ADMIN — SODIUM CHLORIDE, PRESERVATIVE FREE 10 ML: 5 INJECTION INTRAVENOUS at 08:54

## 2025-03-14 RX ADMIN — LACTULOSE 20 G: 10 SOLUTION ORAL at 08:53

## 2025-03-14 RX ADMIN — BUMETANIDE 2 MG: 1 TABLET ORAL at 08:53

## 2025-03-14 RX ADMIN — RIFAXIMIN 400 MG: 200 TABLET ORAL at 08:54

## 2025-03-15 LAB
ALBUMIN SERPL-MCNC: 3.7 G/DL (ref 3.5–5.2)
ALP SERPL-CCNC: 222 U/L (ref 40–129)
ALT SERPL-CCNC: 32 U/L (ref 0–40)
ANION GAP SERPL CALCULATED.3IONS-SCNC: 16 MMOL/L (ref 7–16)
AST SERPL-CCNC: 60 U/L (ref 0–39)
BASOPHILS # BLD: 0.01 K/UL (ref 0–0.2)
BASOPHILS NFR BLD: 0 % (ref 0–2)
BILIRUB SERPL-MCNC: 1.7 MG/DL (ref 0–1.2)
BUN SERPL-MCNC: 33 MG/DL (ref 6–20)
CALCIUM SERPL-MCNC: 9.2 MG/DL (ref 8.6–10.2)
CHLORIDE SERPL-SCNC: 98 MMOL/L (ref 98–107)
CO2 SERPL-SCNC: 22 MMOL/L (ref 22–29)
CREAT SERPL-MCNC: 1.9 MG/DL (ref 0.7–1.2)
EOSINOPHIL # BLD: 0.32 K/UL (ref 0.05–0.5)
EOSINOPHILS RELATIVE PERCENT: 5 % (ref 0–6)
ERYTHROCYTE [DISTWIDTH] IN BLOOD BY AUTOMATED COUNT: 19.1 % (ref 11.5–15)
GFR, ESTIMATED: 40 ML/MIN/1.73M2
GLUCOSE SERPL-MCNC: 102 MG/DL (ref 74–99)
HCT VFR BLD AUTO: 30.7 % (ref 37–54)
HGB BLD-MCNC: 9.6 G/DL (ref 12.5–16.5)
IMM GRANULOCYTES # BLD AUTO: 0.03 K/UL (ref 0–0.58)
IMM GRANULOCYTES NFR BLD: 1 % (ref 0–5)
INR PPP: 2.5
LYMPHOCYTES NFR BLD: 0.95 K/UL (ref 1.5–4)
LYMPHOCYTES RELATIVE PERCENT: 15 % (ref 20–42)
MAGNESIUM SERPL-MCNC: 1.8 MG/DL (ref 1.6–2.6)
MCH RBC QN AUTO: 30.9 PG (ref 26–35)
MCHC RBC AUTO-ENTMCNC: 31.3 G/DL (ref 32–34.5)
MCV RBC AUTO: 98.7 FL (ref 80–99.9)
MONOCYTES NFR BLD: 0.78 K/UL (ref 0.1–0.95)
MONOCYTES NFR BLD: 12 % (ref 2–12)
NEUTROPHILS NFR BLD: 67 % (ref 43–80)
NEUTS SEG NFR BLD: 4.31 K/UL (ref 1.8–7.3)
PARTIAL THROMBOPLASTIN TIME: 73.1 SEC (ref 24.5–35.1)
PHOSPHATE SERPL-MCNC: 3.8 MG/DL (ref 2.5–4.5)
PLATELET # BLD AUTO: 118 K/UL (ref 130–450)
PMV BLD AUTO: 12.6 FL (ref 7–12)
POTASSIUM SERPL-SCNC: 4 MMOL/L (ref 3.5–5)
PROT SERPL-MCNC: 7.2 G/DL (ref 6.4–8.3)
PROTHROMBIN TIME: 27.3 SEC (ref 9.3–12.4)
RBC # BLD AUTO: 3.11 M/UL (ref 3.8–5.8)
SODIUM SERPL-SCNC: 136 MMOL/L (ref 132–146)
WBC OTHER # BLD: 6.4 K/UL (ref 4.5–11.5)

## 2025-03-15 PROCEDURE — 6370000000 HC RX 637 (ALT 250 FOR IP): Performed by: INTERNAL MEDICINE

## 2025-03-15 PROCEDURE — 2580000003 HC RX 258

## 2025-03-15 PROCEDURE — 1200000000 HC SEMI PRIVATE

## 2025-03-15 PROCEDURE — 99232 SBSQ HOSP IP/OBS MODERATE 35: CPT | Performed by: INTERNAL MEDICINE

## 2025-03-15 PROCEDURE — 2500000003 HC RX 250 WO HCPCS

## 2025-03-15 PROCEDURE — 80053 COMPREHEN METABOLIC PANEL: CPT

## 2025-03-15 PROCEDURE — 6360000002 HC RX W HCPCS: Performed by: INTERNAL MEDICINE

## 2025-03-15 PROCEDURE — 6360000002 HC RX W HCPCS

## 2025-03-15 PROCEDURE — 84100 ASSAY OF PHOSPHORUS: CPT

## 2025-03-15 PROCEDURE — 36415 COLL VENOUS BLD VENIPUNCTURE: CPT

## 2025-03-15 PROCEDURE — 85730 THROMBOPLASTIN TIME PARTIAL: CPT

## 2025-03-15 PROCEDURE — 6370000000 HC RX 637 (ALT 250 FOR IP)

## 2025-03-15 PROCEDURE — 83735 ASSAY OF MAGNESIUM: CPT

## 2025-03-15 PROCEDURE — 85025 COMPLETE CBC W/AUTO DIFF WBC: CPT

## 2025-03-15 PROCEDURE — 94640 AIRWAY INHALATION TREATMENT: CPT

## 2025-03-15 PROCEDURE — 85610 PROTHROMBIN TIME: CPT

## 2025-03-15 RX ORDER — WARFARIN SODIUM 5 MG/1
5 TABLET ORAL
Status: DISCONTINUED | OUTPATIENT
Start: 2025-03-15 | End: 2025-03-15

## 2025-03-15 RX ORDER — WARFARIN SODIUM 4 MG/1
4 TABLET ORAL
Status: COMPLETED | OUTPATIENT
Start: 2025-03-15 | End: 2025-03-15

## 2025-03-15 RX ADMIN — BUMETANIDE 2 MG: 1 TABLET ORAL at 09:49

## 2025-03-15 RX ADMIN — Medication 100 MG: at 09:49

## 2025-03-15 RX ADMIN — SODIUM CHLORIDE, PRESERVATIVE FREE 10 ML: 5 INJECTION INTRAVENOUS at 09:50

## 2025-03-15 RX ADMIN — SODIUM CHLORIDE, PRESERVATIVE FREE 10 ML: 5 INJECTION INTRAVENOUS at 22:07

## 2025-03-15 RX ADMIN — RIFAXIMIN 400 MG: 200 TABLET ORAL at 22:04

## 2025-03-15 RX ADMIN — BUDESONIDE INHALATION 500 MCG: 0.5 SUSPENSION RESPIRATORY (INHALATION) at 07:49

## 2025-03-15 RX ADMIN — HEPARIN SODIUM 11 UNITS/KG/HR: 10000 INJECTION, SOLUTION INTRAVENOUS at 22:15

## 2025-03-15 RX ADMIN — LORAZEPAM 1 MG: 2 INJECTION INTRAMUSCULAR; INTRAVENOUS at 23:54

## 2025-03-15 RX ADMIN — RIFAXIMIN 400 MG: 200 TABLET ORAL at 14:30

## 2025-03-15 RX ADMIN — ARFORMOTEROL TARTRATE 15 MCG: 15 SOLUTION RESPIRATORY (INHALATION) at 07:49

## 2025-03-15 RX ADMIN — FOLIC ACID 1 MG: 5 INJECTION, SOLUTION INTRAMUSCULAR; INTRAVENOUS; SUBCUTANEOUS at 09:49

## 2025-03-15 RX ADMIN — LORAZEPAM 1 MG: 2 INJECTION INTRAMUSCULAR; INTRAVENOUS at 09:49

## 2025-03-15 RX ADMIN — LACTULOSE 20 G: 10 SOLUTION ORAL at 22:06

## 2025-03-15 RX ADMIN — MULTIVITAMIN TABLET 1 TABLET: TABLET at 09:49

## 2025-03-15 RX ADMIN — Medication 400 MG: at 09:49

## 2025-03-15 RX ADMIN — PANTOPRAZOLE SODIUM 40 MG: 40 INJECTION, POWDER, FOR SOLUTION INTRAVENOUS at 09:49

## 2025-03-15 RX ADMIN — WARFARIN SODIUM 4 MG: 4 TABLET ORAL at 17:24

## 2025-03-15 RX ADMIN — RIFAXIMIN 400 MG: 200 TABLET ORAL at 09:49

## 2025-03-15 ASSESSMENT — PAIN SCALES - GENERAL: PAINLEVEL_OUTOF10: 0

## 2025-03-16 LAB
ALBUMIN SERPL-MCNC: 3.7 G/DL (ref 3.5–5.2)
ALP SERPL-CCNC: 225 U/L (ref 40–129)
ALT SERPL-CCNC: 33 U/L (ref 0–40)
ANION GAP SERPL CALCULATED.3IONS-SCNC: 17 MMOL/L (ref 7–16)
AST SERPL-CCNC: 61 U/L (ref 0–39)
BASOPHILS # BLD: 0.02 K/UL (ref 0–0.2)
BASOPHILS NFR BLD: 0 % (ref 0–2)
BILIRUB SERPL-MCNC: 1.7 MG/DL (ref 0–1.2)
BUN SERPL-MCNC: 36 MG/DL (ref 6–20)
CALCIUM SERPL-MCNC: 9.5 MG/DL (ref 8.6–10.2)
CHLORIDE SERPL-SCNC: 98 MMOL/L (ref 98–107)
CO2 SERPL-SCNC: 22 MMOL/L (ref 22–29)
CREAT SERPL-MCNC: 1.9 MG/DL (ref 0.7–1.2)
EOSINOPHIL # BLD: 0.29 K/UL (ref 0.05–0.5)
EOSINOPHILS RELATIVE PERCENT: 4 % (ref 0–6)
ERYTHROCYTE [DISTWIDTH] IN BLOOD BY AUTOMATED COUNT: 18.8 % (ref 11.5–15)
GFR, ESTIMATED: 40 ML/MIN/1.73M2
GLUCOSE SERPL-MCNC: 110 MG/DL (ref 74–99)
HCT VFR BLD AUTO: 29.6 % (ref 37–54)
HGB BLD-MCNC: 9.4 G/DL (ref 12.5–16.5)
IMM GRANULOCYTES # BLD AUTO: 0.04 K/UL (ref 0–0.58)
IMM GRANULOCYTES NFR BLD: 1 % (ref 0–5)
INR PPP: 2.7
LYMPHOCYTES NFR BLD: 1.06 K/UL (ref 1.5–4)
LYMPHOCYTES RELATIVE PERCENT: 15 % (ref 20–42)
MAGNESIUM SERPL-MCNC: 1.8 MG/DL (ref 1.6–2.6)
MCH RBC QN AUTO: 30.6 PG (ref 26–35)
MCHC RBC AUTO-ENTMCNC: 31.8 G/DL (ref 32–34.5)
MCV RBC AUTO: 96.4 FL (ref 80–99.9)
MONOCYTES NFR BLD: 0.96 K/UL (ref 0.1–0.95)
MONOCYTES NFR BLD: 14 % (ref 2–12)
NEUTROPHILS NFR BLD: 66 % (ref 43–80)
NEUTS SEG NFR BLD: 4.64 K/UL (ref 1.8–7.3)
PARTIAL THROMBOPLASTIN TIME: 42.9 SEC (ref 24.5–35.1)
PARTIAL THROMBOPLASTIN TIME: 81.2 SEC (ref 24.5–35.1)
PHOSPHATE SERPL-MCNC: 3.7 MG/DL (ref 2.5–4.5)
PLATELET # BLD AUTO: 110 K/UL (ref 130–450)
PMV BLD AUTO: 12.5 FL (ref 7–12)
POTASSIUM SERPL-SCNC: 4.1 MMOL/L (ref 3.5–5)
PROT SERPL-MCNC: 7.3 G/DL (ref 6.4–8.3)
PROTHROMBIN TIME: 29.9 SEC (ref 9.3–12.4)
RBC # BLD AUTO: 3.07 M/UL (ref 3.8–5.8)
SODIUM SERPL-SCNC: 137 MMOL/L (ref 132–146)
WBC OTHER # BLD: 7 K/UL (ref 4.5–11.5)

## 2025-03-16 PROCEDURE — 6370000000 HC RX 637 (ALT 250 FOR IP): Performed by: INTERNAL MEDICINE

## 2025-03-16 PROCEDURE — 85025 COMPLETE CBC W/AUTO DIFF WBC: CPT

## 2025-03-16 PROCEDURE — 99232 SBSQ HOSP IP/OBS MODERATE 35: CPT | Performed by: INTERNAL MEDICINE

## 2025-03-16 PROCEDURE — 83735 ASSAY OF MAGNESIUM: CPT

## 2025-03-16 PROCEDURE — 36415 COLL VENOUS BLD VENIPUNCTURE: CPT

## 2025-03-16 PROCEDURE — 6370000000 HC RX 637 (ALT 250 FOR IP)

## 2025-03-16 PROCEDURE — 2580000003 HC RX 258

## 2025-03-16 PROCEDURE — 6360000002 HC RX W HCPCS

## 2025-03-16 PROCEDURE — 1200000000 HC SEMI PRIVATE

## 2025-03-16 PROCEDURE — 2500000003 HC RX 250 WO HCPCS

## 2025-03-16 PROCEDURE — 85610 PROTHROMBIN TIME: CPT

## 2025-03-16 PROCEDURE — 84100 ASSAY OF PHOSPHORUS: CPT

## 2025-03-16 PROCEDURE — 80053 COMPREHEN METABOLIC PANEL: CPT

## 2025-03-16 PROCEDURE — 94640 AIRWAY INHALATION TREATMENT: CPT

## 2025-03-16 PROCEDURE — 85730 THROMBOPLASTIN TIME PARTIAL: CPT

## 2025-03-16 RX ORDER — WARFARIN SODIUM 4 MG/1
4 TABLET ORAL
Status: COMPLETED | OUTPATIENT
Start: 2025-03-16 | End: 2025-03-16

## 2025-03-16 RX ADMIN — SODIUM CHLORIDE, PRESERVATIVE FREE 10 ML: 5 INJECTION INTRAVENOUS at 21:19

## 2025-03-16 RX ADMIN — LACTULOSE 20 G: 10 SOLUTION ORAL at 08:50

## 2025-03-16 RX ADMIN — PANTOPRAZOLE SODIUM 40 MG: 40 INJECTION, POWDER, FOR SOLUTION INTRAVENOUS at 08:50

## 2025-03-16 RX ADMIN — RIFAXIMIN 400 MG: 200 TABLET ORAL at 08:48

## 2025-03-16 RX ADMIN — BUDESONIDE INHALATION 500 MCG: 0.5 SUSPENSION RESPIRATORY (INHALATION) at 09:44

## 2025-03-16 RX ADMIN — SODIUM CHLORIDE, PRESERVATIVE FREE 10 ML: 5 INJECTION INTRAVENOUS at 08:51

## 2025-03-16 RX ADMIN — SODIUM CHLORIDE, PRESERVATIVE FREE 10 ML: 5 INJECTION INTRAVENOUS at 21:10

## 2025-03-16 RX ADMIN — RIFAXIMIN 400 MG: 200 TABLET ORAL at 14:55

## 2025-03-16 RX ADMIN — Medication 100 MG: at 08:50

## 2025-03-16 RX ADMIN — LACTULOSE 20 G: 10 SOLUTION ORAL at 14:55

## 2025-03-16 RX ADMIN — Medication 400 MG: at 08:48

## 2025-03-16 RX ADMIN — BUMETANIDE 2 MG: 1 TABLET ORAL at 09:07

## 2025-03-16 RX ADMIN — MULTIVITAMIN TABLET 1 TABLET: TABLET at 08:49

## 2025-03-16 RX ADMIN — WARFARIN SODIUM 4 MG: 4 TABLET ORAL at 18:51

## 2025-03-16 RX ADMIN — ARFORMOTEROL TARTRATE 15 MCG: 15 SOLUTION RESPIRATORY (INHALATION) at 09:44

## 2025-03-16 RX ADMIN — LACTULOSE 20 G: 10 SOLUTION ORAL at 21:10

## 2025-03-16 RX ADMIN — RIFAXIMIN 400 MG: 200 TABLET ORAL at 21:10

## 2025-03-16 RX ADMIN — FOLIC ACID 1 MG: 5 INJECTION, SOLUTION INTRAMUSCULAR; INTRAVENOUS; SUBCUTANEOUS at 08:51

## 2025-03-16 ASSESSMENT — PAIN SCALES - GENERAL
PAINLEVEL_OUTOF10: 7
PAINLEVEL_OUTOF10: 0

## 2025-03-16 ASSESSMENT — PAIN DESCRIPTION - DESCRIPTORS: DESCRIPTORS: DISCOMFORT

## 2025-03-16 ASSESSMENT — PAIN DESCRIPTION - LOCATION: LOCATION: NECK;BACK

## 2025-03-17 VITALS
HEART RATE: 105 BPM | BODY MASS INDEX: 31.62 KG/M2 | RESPIRATION RATE: 19 BRPM | SYSTOLIC BLOOD PRESSURE: 118 MMHG | WEIGHT: 220.9 LBS | TEMPERATURE: 98.6 F | DIASTOLIC BLOOD PRESSURE: 73 MMHG | OXYGEN SATURATION: 97 % | HEIGHT: 70 IN

## 2025-03-17 LAB
ALBUMIN SERPL-MCNC: 3.4 G/DL (ref 3.5–5.2)
ALP SERPL-CCNC: 209 U/L (ref 40–129)
ALT SERPL-CCNC: 30 U/L (ref 0–40)
ANION GAP SERPL CALCULATED.3IONS-SCNC: 17 MMOL/L (ref 7–16)
AST SERPL-CCNC: 58 U/L (ref 0–39)
BASOPHILS # BLD: 0.05 K/UL (ref 0–0.2)
BASOPHILS NFR BLD: 1 % (ref 0–2)
BILIRUB SERPL-MCNC: 1.6 MG/DL (ref 0–1.2)
BUN SERPL-MCNC: 31 MG/DL (ref 6–20)
CALCIUM SERPL-MCNC: 9.4 MG/DL (ref 8.6–10.2)
CHLORIDE SERPL-SCNC: 103 MMOL/L (ref 98–107)
CO2 SERPL-SCNC: 19 MMOL/L (ref 22–29)
CREAT SERPL-MCNC: 1.7 MG/DL (ref 0.7–1.2)
EOSINOPHIL # BLD: 0.29 K/UL (ref 0.05–0.5)
EOSINOPHILS RELATIVE PERCENT: 4 % (ref 0–6)
ERYTHROCYTE [DISTWIDTH] IN BLOOD BY AUTOMATED COUNT: 18.5 % (ref 11.5–15)
GFR, ESTIMATED: 46 ML/MIN/1.73M2
GLUCOSE SERPL-MCNC: 100 MG/DL (ref 74–99)
HCT VFR BLD AUTO: 32.1 % (ref 37–54)
HGB BLD-MCNC: 9.9 G/DL (ref 12.5–16.5)
IMM GRANULOCYTES # BLD AUTO: <0.03 K/UL (ref 0–0.58)
IMM GRANULOCYTES NFR BLD: 0 % (ref 0–5)
INR PPP: 3.5
LYMPHOCYTES NFR BLD: 0.88 K/UL (ref 1.5–4)
LYMPHOCYTES RELATIVE PERCENT: 13 % (ref 20–42)
MAGNESIUM SERPL-MCNC: 1.7 MG/DL (ref 1.6–2.6)
MCH RBC QN AUTO: 30.8 PG (ref 26–35)
MCHC RBC AUTO-ENTMCNC: 30.8 G/DL (ref 32–34.5)
MCV RBC AUTO: 100 FL (ref 80–99.9)
MONOCYTES NFR BLD: 0.87 K/UL (ref 0.1–0.95)
MONOCYTES NFR BLD: 13 % (ref 2–12)
NEUTROPHILS NFR BLD: 69 % (ref 43–80)
NEUTS SEG NFR BLD: 4.65 K/UL (ref 1.8–7.3)
PARTIAL THROMBOPLASTIN TIME: 41.5 SEC (ref 24.5–35.1)
PHOSPHATE SERPL-MCNC: 4.4 MG/DL (ref 2.5–4.5)
PLATELET CONFIRMATION: NORMAL
PLATELET, FLUORESCENCE: 88 K/UL (ref 130–450)
PMV BLD AUTO: 12.9 FL (ref 7–12)
POTASSIUM SERPL-SCNC: 4.3 MMOL/L (ref 3.5–5)
PROT SERPL-MCNC: 7.1 G/DL (ref 6.4–8.3)
PROTHROMBIN TIME: 38.1 SEC (ref 9.3–12.4)
RBC # BLD AUTO: 3.21 M/UL (ref 3.8–5.8)
SODIUM SERPL-SCNC: 139 MMOL/L (ref 132–146)
WBC OTHER # BLD: 6.8 K/UL (ref 4.5–11.5)

## 2025-03-17 PROCEDURE — 6370000000 HC RX 637 (ALT 250 FOR IP): Performed by: INTERNAL MEDICINE

## 2025-03-17 PROCEDURE — 85025 COMPLETE CBC W/AUTO DIFF WBC: CPT

## 2025-03-17 PROCEDURE — 99232 SBSQ HOSP IP/OBS MODERATE 35: CPT | Performed by: NURSE PRACTITIONER

## 2025-03-17 PROCEDURE — 2500000003 HC RX 250 WO HCPCS

## 2025-03-17 PROCEDURE — 99239 HOSP IP/OBS DSCHRG MGMT >30: CPT | Performed by: STUDENT IN AN ORGANIZED HEALTH CARE EDUCATION/TRAINING PROGRAM

## 2025-03-17 PROCEDURE — 6360000002 HC RX W HCPCS

## 2025-03-17 PROCEDURE — 2580000003 HC RX 258

## 2025-03-17 PROCEDURE — 94640 AIRWAY INHALATION TREATMENT: CPT

## 2025-03-17 PROCEDURE — 80053 COMPREHEN METABOLIC PANEL: CPT

## 2025-03-17 PROCEDURE — 36415 COLL VENOUS BLD VENIPUNCTURE: CPT

## 2025-03-17 PROCEDURE — 85610 PROTHROMBIN TIME: CPT

## 2025-03-17 PROCEDURE — 6370000000 HC RX 637 (ALT 250 FOR IP)

## 2025-03-17 PROCEDURE — 85730 THROMBOPLASTIN TIME PARTIAL: CPT

## 2025-03-17 PROCEDURE — 84100 ASSAY OF PHOSPHORUS: CPT

## 2025-03-17 PROCEDURE — 83735 ASSAY OF MAGNESIUM: CPT

## 2025-03-17 RX ORDER — WARFARIN SODIUM 4 MG/1
4 TABLET ORAL EVERY OTHER DAY
Status: DISCONTINUED | OUTPATIENT
Start: 2025-03-18 | End: 2025-03-17 | Stop reason: HOSPADM

## 2025-03-17 RX ORDER — WARFARIN SODIUM 3 MG/1
3 TABLET ORAL EVERY OTHER DAY
Status: DISCONTINUED | OUTPATIENT
Start: 2025-03-17 | End: 2025-03-17 | Stop reason: HOSPADM

## 2025-03-17 RX ADMIN — Medication 100 MG: at 09:06

## 2025-03-17 RX ADMIN — BUMETANIDE 2 MG: 1 TABLET ORAL at 08:48

## 2025-03-17 RX ADMIN — RIFAXIMIN 400 MG: 200 TABLET ORAL at 15:50

## 2025-03-17 RX ADMIN — Medication 400 MG: at 08:49

## 2025-03-17 RX ADMIN — ARFORMOTEROL TARTRATE 15 MCG: 15 SOLUTION RESPIRATORY (INHALATION) at 07:53

## 2025-03-17 RX ADMIN — MULTIVITAMIN TABLET 1 TABLET: TABLET at 08:49

## 2025-03-17 RX ADMIN — RIFAXIMIN 400 MG: 200 TABLET ORAL at 08:48

## 2025-03-17 RX ADMIN — SODIUM CHLORIDE, PRESERVATIVE FREE 10 ML: 5 INJECTION INTRAVENOUS at 09:07

## 2025-03-17 RX ADMIN — LACTULOSE 20 G: 10 SOLUTION ORAL at 08:49

## 2025-03-17 RX ADMIN — BUDESONIDE INHALATION 500 MCG: 0.5 SUSPENSION RESPIRATORY (INHALATION) at 07:53

## 2025-03-17 RX ADMIN — PANTOPRAZOLE SODIUM 40 MG: 40 INJECTION, POWDER, FOR SOLUTION INTRAVENOUS at 08:50

## 2025-03-17 RX ADMIN — FOLIC ACID 1 MG: 5 INJECTION, SOLUTION INTRAMUSCULAR; INTRAVENOUS; SUBCUTANEOUS at 08:51

## 2025-03-17 NOTE — PROGRESS NOTES
[facility] Hospitalist Progress Note    SYNOPSIS: Patient admitted on 2025 for Hypoxia  9-year-old male with past medical history of alcoholic liver disease, aortic valve replacement, mitral valve replacement presented to the hospital with chief complaint of shortness of breath, was saturating 78% on room air, patient also had history of falls, is a chronic drinker.  Vitals blood pressure 102/74, afebrile, pulse 89.SpO2 100% on 6 L nasal cannula. Laboratory studies demonstrate sodium 126, potassium 2.6, BUN 39, creatinine 2.6, anion gap 20, lactic acid 6.2, proBNP 1241, troponin 214 with a repeat of 199 and a hemoglobin of 7.0. Patient was placed on BiPAP in the emergency department  Patient's hemoglobin dropped, patient received 2 unit of packed RBC transfusion during his hospital stay, GI was consulted for GI bleed, cardiology was consulted for CHF   transferred to MICU for alcohol withdrawal with DT   received 1 unit of packed RBC transfusion      hypernatremic increase free water flushes  Intubated and sedated for airway protection  -D5 @100 ml/hr for persistent hyperglycemia; fwf 500 Q3hrly     hypernatemia corrected; getting EGD today; on iv diuresis    3/1-hb 6.8; getting prbc transfusions    3/5-febrile overnight; pan cultures ordered; pt is agitated currently on restrain; precedex weaned off    3/6-no acute overnight complaints  3/8-on 3 L nasal cannula, heparin/Coumadin bridge, INR 1.7 subtherapeutic.  Sodium improving    SUBJECTIVE:  Stable overnight. No other overnight issues reported.   Patient seen and examined; follows commands  Records reviewed.   Confused  INR pending today      Temp (24hrs), Av.6 °F (37 °C), Min:98.1 °F (36.7 °C), Max:99 °F (37.2 °C)    DIET: ADULT DIET; Dysphagia - Soft and Bite Sized; Low Sodium (2 gm); Mildly Thick (Nectar)  ADULT ORAL NUTRITION SUPPLEMENT; Lunch, Dinner; Frozen Oral Supplement  CODE: Full Code    Intake/Output Summary 
       [facility] Hospitalist Progress Note    SYNOPSIS: Patient admitted on 2025 for Hypoxia  9-year-old male with past medical history of alcoholic liver disease, aortic valve replacement, mitral valve replacement presented to the hospital with chief complaint of shortness of breath, was saturating 78% on room air, patient also had history of falls, is a chronic drinker.  Vitals blood pressure 102/74, afebrile, pulse 89.SpO2 100% on 6 L nasal cannula. Laboratory studies demonstrate sodium 126, potassium 2.6, BUN 39, creatinine 2.6, anion gap 20, lactic acid 6.2, proBNP 1241, troponin 214 with a repeat of 199 and a hemoglobin of 7.0. Patient was placed on BiPAP in the emergency department  Patient's hemoglobin dropped, patient received 2 unit of packed RBC transfusion during his hospital stay, GI was consulted for GI bleed, cardiology was consulted for CHF   transferred to MICU for alcohol withdrawal with DT   received 1 unit of packed RBC transfusion      hypernatremic increase free water flushes  Intubated and sedated for airway protection  -D5 @100 ml/hr for persistent hyperglycemia; fwf 500 Q3hrly     hypernatemia corrected; getting EGD today; on iv diuresis    3/1-hb 6.8; getting prbc transfusions    3/5-febrile overnight; pan cultures ordered; pt is agitated currently on restrain; precedex weaned off    3/6-no acute overnight complaints  3/8-on 3 L nasal cannula, heparin/Coumadin bridge, INR 1.7 subtherapeutic.  Sodium improving    SUBJECTIVE:  Stable overnight. No other overnight issues reported.   Patient seen and examined; follows commands  Records reviewed.       Temp (24hrs), Av.1 °F (36.7 °C), Min:97.8 °F (36.6 °C), Max:98.4 °F (36.9 °C)    DIET: ADULT DIET; Dysphagia - Soft and Bite Sized; Mildly Thick (Nectar)  CODE: Full Code    Intake/Output Summary (Last 24 hours) at 3/8/2025 1328  Last data filed at 3/8/2025 1023  Gross per 24 hour   Intake 560 ml   Output 800 ml 
       [facility] Hospitalist Progress Note    SYNOPSIS: Patient admitted on 2025 for Hypoxia  9-year-old male with past medical history of alcoholic liver disease, aortic valve replacement, mitral valve replacement presented to the hospital with chief complaint of shortness of breath, was saturating 78% on room air, patient also had history of falls, is a chronic drinker.  Vitals blood pressure 102/74, afebrile, pulse 89.SpO2 100% on 6 L nasal cannula. Laboratory studies demonstrate sodium 126, potassium 2.6, BUN 39, creatinine 2.6, anion gap 20, lactic acid 6.2, proBNP 1241, troponin 214 with a repeat of 199 and a hemoglobin of 7.0. Patient was placed on BiPAP in the emergency department  Patient's hemoglobin dropped, patient received 2 unit of packed RBC transfusion during his hospital stay, GI was consulted for GI bleed, cardiology was consulted for CHF   transferred to MICU for alcohol withdrawal with DT   received 1 unit of packed RBC transfusion      hypernatremic increase free water flushes  Intubated and sedated for airway protection  -D5 @100 ml/hr for persistent hyperglycemia; fwf 500 Q3hrly     hypernatemia corrected; getting EGD today; on iv diuresis  SUBJECTIVE:  Stable overnight. No other overnight issues reported.   Patient seen and examined  Records reviewed.           Temp (24hrs), Av.1 °F (37.3 °C), Min:99 °F (37.2 °C), Max:99.2 °F (37.3 °C)    DIET: Diet NPO Exceptions are: Sips of Water with Meds  CODE: Full Code    Intake/Output Summary (Last 24 hours) at 2025 1233  Last data filed at 2025 1216  Gross per 24 hour   Intake 6220.73 ml   Output 4150 ml   Net 2070.73 ml       Review of Systems  Could not be obtained      OBJECTIVE:    BP (!) 111/58   Pulse 90   Temp 99 °F (37.2 °C)   Resp 16   Ht 1.778 m (5' 10\")   Wt 118.4 kg (261 lb)   SpO2 96%   BMI 37.45 kg/m²     General appearance: intubated and sedated  HEENT:  Conjunctivae/corneas clear. 
       OT consult received and appreciated. Chart reviewed. Pt discussed at rounds and consulted RN. Pt is not appropriate for occupational therapy services after multiple attempts. Will d/c orders- please re-consult as indicated. Thank you. Camille Jensen, OTR/L # [id number]     
       [facility] Hospitalist Progress Note    SYNOPSIS: Patient admitted on 2025 for Hypoxia  9-year-old male with past medical history of alcoholic liver disease, aortic valve replacement, mitral valve replacement presented to the hospital with chief complaint of shortness of breath, was saturating 78% on room air, patient also had history of falls, is a chronic drinker.  Vitals blood pressure 102/74, afebrile, pulse 89.SpO2 100% on 6 L nasal cannula. Laboratory studies demonstrate sodium 126, potassium 2.6, BUN 39, creatinine 2.6, anion gap 20, lactic acid 6.2, proBNP 1241, troponin 214 with a repeat of 199 and a hemoglobin of 7.0. Patient was placed on BiPAP in the emergency department  Patient's hemoglobin dropped, patient received 2 unit of packed RBC transfusion during his hospital stay, GI was consulted for GI bleed, cardiology was consulted for CHF   transferred to MICU for alcohol withdrawal with DT   received 1 unit of packed RBC transfusion      hypernatremic increase free water flushes  Intubated and sedated for airway protection  -D5 @100 ml/hr for persistent hyperglycemia; fwf 500 Q3hrly     hypernatemia corrected; getting EGD today; on iv diuresis    3/1-hb 6.8; getting prbc transfusions    3/2-cxr---Cardiomegaly with improvement in the pulmonary vascularity in the interval. Some increased markings at the left lung base with small left pleural effusion.   SUBJECTIVE:  Stable overnight. No other overnight issues reported.   Patient seen and examined  Records reviewed.           Temp (24hrs), Av.4 °F (36.9 °C), Min:98 °F (36.7 °C), Max:98.8 °F (37.1 °C)    DIET: Diet NPO Exceptions are: Sips of Water with Meds  ADULT TUBE FEEDING; Nasogastric; Standard with Fiber; Continuous; 10; Yes; 10; Q 4 hours; 60; 500; Q 3 hours; Protein; 1 Dose; Daily  CODE: Full Code    Intake/Output Summary (Last 24 hours) at 3/2/2025 1058  Last data filed at 3/2/2025 0600  Gross per 24 hour 
       [facility] Hospitalist Progress Note    SYNOPSIS: Patient admitted on 2025 for Hypoxia  9-year-old male with past medical history of alcoholic liver disease, aortic valve replacement, mitral valve replacement presented to the hospital with chief complaint of shortness of breath, was saturating 78% on room air, patient also had history of falls, is a chronic drinker.  Vitals blood pressure 102/74, afebrile, pulse 89.SpO2 100% on 6 L nasal cannula. Laboratory studies demonstrate sodium 126, potassium 2.6, BUN 39, creatinine 2.6, anion gap 20, lactic acid 6.2, proBNP 1241, troponin 214 with a repeat of 199 and a hemoglobin of 7.0. Patient was placed on BiPAP in the emergency department  Patient's hemoglobin dropped, patient received 2 unit of packed RBC transfusion during his hospital stay, GI was consulted for GI bleed, cardiology was consulted for CHF   transferred to MICU for alcohol withdrawal with DT   received 1 unit of packed RBC transfusion      hypernatremic increase free water flushes  Intubated and sedated for airway protection  -D5 @100 ml/hr for persistent hyperglycemia; fwf 500 Q3hrly     hypernatemia corrected; getting EGD today; on iv diuresis    3/1-hb 6.8; getting prbc transfusions    3/5-febrile overnight; pan cultures ordered; pt is agitated currently on restrain; precedex weaned off    3/6-no acute overnight complaints  3/8-on 3 L nasal cannula, heparin/Coumadin bridge, INR 1.7 subtherapeutic.  Sodium improving    SUBJECTIVE:  Stable overnight. No other overnight issues reported.   Patient seen and examined; follows commands  Records reviewed.   Confused      Temp (24hrs), Av.8 °F (36.6 °C), Min:97.2 °F (36.2 °C), Max:98.4 °F (36.9 °C)    DIET: ADULT DIET; Dysphagia - Soft and Bite Sized; Low Sodium (2 gm); Mildly Thick (Nectar)  ADULT ORAL NUTRITION SUPPLEMENT; Lunch, Dinner; Frozen Oral Supplement  CODE: Full Code    Intake/Output Summary (Last 24 hours) at 
     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      Cirrhosis of liver (EtOH)      Prior withdrawals - Yes   Prior withdrawal seizures- not known   Amount of alcohol consumption - massive   Last drink- the day of presentation      Anion gap metabolic acidosis - resolved     Transaminitis - ETOH induced , continue to follow      - Resume coumadin for MVR/AVR   - Continue diuresis   -Continue CIWA protocol, no longer withdrawing  - Thiamine , Multivitamin, Folate   - COVID 19 and Flu A  ruled out - Respiratory viral panel negative   - Consult ADM once stable     Interval History/Event Changes:  Looks much better  Alert and oriented  Comfortable at rest  No withdrawal symptoms    Allergies  No Known Allergies    Review of Systems    A pertinent review of systems was performed and was otherwise non-contributory.    Vitals-     BP (!) 127/50   Pulse 98   Temp 98 °F (36.7 °C) (Oral)   Resp 20   Ht 1.778 m (5' 10\")   Wt 111 kg (244 lb 11.4 oz)   SpO2 95%   BMI 35.11 kg/m²    Tmax: Temp (24hrs), Av.3 °F (36.8 °C), Min:98 °F (36.7 °C), Max:98.9 °F (37.2 °C)      Hemodynamics:  Cuff:   Systolic (24hrs), Av , Min:111 , Max:127   /Diastolic (24hrs), Av, Min:50, Max:89    Cuff MAP:MAP (mmHg)  Av.8  Min: 40  Max: 136  P:   Pulse  Av.8  Min: 95  Max: 103      Airway:     Observed RR: Resp  Av  Min: 20  Max: 20   Observed O2 
    Hospitalist Progress Note      SYNOPSIS: Patient admitted on 2025 for Hypoxia  59-year-old male with past medical history of alcoholic liver disease, aortic valve replacement, mitral valve replacement presented to the hospital with chief complaint of shortness of breath was saturating 78% on room air, patient also had history of falls, is a chronic drinker.  Vitals blood pressure 102/74, afebrile, pulse 89.SpO2 100% on 6 L nasal cannula. Laboratory studies demonstrate sodium 126, potassium 2.6, BUN 39, creatinine 2.6, anion gap 20, lactic acid 6.2, proBNP 1241, troponin 214 with a repeat of 199 and a hemoglobin of 7.0. Patient was placed on BiPAP in the emergency department       SUBJECTIVE:  Stable overnight. No other overnight issues reported.   Patient seen and examined at bedside today a.m. still complains of shortness of breath.  Patient also complains of multiple episodes of loose stool.  Patient does take Coumadin for his history of valve replacement.  Records reviewed.       Temp (24hrs), Av.9 °F (36.6 °C), Min:97.5 °F (36.4 °C), Max:98.2 °F (36.8 °C)    DIET: ADULT DIET; Regular; Low Sodium (2 gm)  CODE: Full Code  No intake or output data in the 24 hours ending 25 1349    Review of Systems  All bolded are positive; please see HPI  General:  Fever, chills, diaphoresis, fatigue, malaise, night sweats, weight loss  Psychological:  Anxiety, disorientation, hallucinations.  ENT:  Epistaxis, headaches, vertigo, visual changes.  Cardiovascular:  Chest pain, irregular heartbeats, palpitations, paroxysmal nocturnal dyspnea.  Respiratory:  Shortness of breath, coughing, sputum production, hemoptysis, wheezing, orthopnea.  Gastrointestinal:  Nausea, vomiting, diarrhea, heartburn, constipation, abdominal pain, hematemesis, hematochezia, melena, acholic stools  Genito-Urinary:  Dysuria, urgency, frequency, hematuria  Musculoskeletal:  Joint pain, joint stiffness, joint swelling, muscle 
    Hospitalist Progress Note      SYNOPSIS: Patient admitted on 2025 for Hypoxia  59-year-old male with past medical history of alcoholic liver disease, aortic valve replacement, mitral valve replacement presented to the hospital with chief complaint of shortness of breath, was saturating 78% on room air, patient also had history of falls, is a chronic drinker.  Vitals blood pressure 102/74, afebrile, pulse 89.SpO2 100% on 6 L nasal cannula. Laboratory studies demonstrate sodium 126, potassium 2.6, BUN 39, creatinine 2.6, anion gap 20, lactic acid 6.2, proBNP 1241, troponin 214 with a repeat of 199 and a hemoglobin of 7.0. Patient was placed on BiPAP in the emergency department  Patient's hemoglobin dropped, patient received 2 unit of packed RBC transfusion during his hospital stay, GI was consulted for GI bleed, cardiology was consulted for CHF    SUBJECTIVE:  Stable overnight. No other overnight issues reported.   Patient seen and examined at bedside today a.m. patient looks a bit lethargic, does answer to my questions on verbal stimuli  Still saturating at 6 L of oxygen  Discussed with bedside nurse  Patient is having bowel movement  Records reviewed.         Temp (24hrs), Av.2 °F (36.8 °C), Min:97.7 °F (36.5 °C), Max:98.6 °F (37 °C)    DIET: ADULT DIET; Dysphagia - Soft and Bite Sized; Mildly Thick (Nectar)  CODE: Full Code    Intake/Output Summary (Last 24 hours) at 2025 1225  Last data filed at 2025 0559  Gross per 24 hour   Intake 1237.78 ml   Output 300 ml   Net 937.78 ml       Review of Systems  All bolded are positive; please see HPI  General:  Fever, chills, diaphoresis, fatigue, malaise, night sweats, weight loss  Psychological:  Anxiety, disorientation, hallucinations.  ENT:  Epistaxis, headaches, vertigo, visual changes.  Cardiovascular:  Chest pain, irregular heartbeats, palpitations, paroxysmal nocturnal dyspnea.  Respiratory:  Shortness of breath, coughing, sputum production, 
    Hospitalist Progress Note      SYNOPSIS: Patient admitted on 2025 for Hypoxia  59-year-old male with past medical history of alcoholic liver disease, aortic valve replacement, mitral valve replacement presented to the hospital with chief complaint of shortness of breath, was saturating 78% on room air, patient also had history of falls, is a chronic drinker.  Vitals blood pressure 102/74, afebrile, pulse 89.SpO2 100% on 6 L nasal cannula. Laboratory studies demonstrate sodium 126, potassium 2.6, BUN 39, creatinine 2.6, anion gap 20, lactic acid 6.2, proBNP 1241, troponin 214 with a repeat of 199 and a hemoglobin of 7.0. Patient was placed on BiPAP in the emergency department  Patient's hemoglobin dropped, patient received 2 unit of packed RBC transfusion during his hospital stay, GI was consulted for GI bleed, cardiology was consulted for CHF   transferred to MICU for alcohol withdrawal with DT    SUBJECTIVE:  Overnight patient got more agitated, started on IV phenobarbital drip  Patient seen and examined at bedside today a.m. was responsive to deep painful stimulus, was using his BiPAP  Records reviewed.         Temp (24hrs), Av.7 °F (37.1 °C), Min:98 °F (36.7 °C), Max:99.7 °F (37.6 °C)    DIET: Diet NPO  ADULT TUBE FEEDING; Nasogastric; Standard with Fiber; Continuous; 10; Yes; 10; Q 4 hours; 45; 30; Q 4 hours  CODE: Full Code    Intake/Output Summary (Last 24 hours) at 2025 1158  Last data filed at 2025 0400  Gross per 24 hour   Intake 50 ml   Output 350 ml   Net -300 ml       Review of Systems  Could not be assessed properly      OBJECTIVE:    BP (!) 141/129   Pulse 95   Temp 99.7 °F (37.6 °C) (Temporal)   Resp 24   Ht 1.778 m (5' 10\")   Wt 117.7 kg (259 lb 6.4 oz)   SpO2 100%   BMI 37.22 kg/m²     General appearance: Lethargic  Using BiPAP  HEENT:  Conjunctivae/corneas clear.   Neck: Supple. No jugular venous distention.   Respiratory: symmetrical; clear to auscultation 
    Hospitalist Progress Note      SYNOPSIS: Patient admitted on 2025 for Hypoxia  59-year-old male with past medical history of alcoholic liver disease, aortic valve replacement, mitral valve replacement presented to the hospital with chief complaint of shortness of breath, was saturating 78% on room air, patient also had history of falls, is a chronic drinker.  Vitals blood pressure 102/74, afebrile, pulse 89.SpO2 100% on 6 L nasal cannula. Laboratory studies demonstrate sodium 126, potassium 2.6, BUN 39, creatinine 2.6, anion gap 20, lactic acid 6.2, proBNP 1241, troponin 214 with a repeat of 199 and a hemoglobin of 7.0. Patient was placed on BiPAP in the emergency department  Patient's hemoglobin dropped, patient received 2 unit of packed RBC transfusion during his hospital stay, GI was consulted for GI bleed, cardiology was consulted for CHF   transferred to MICU for alcohol withdrawal with DT    SUBJECTIVE:  Patient seen and examined at bedside today a.m., was using his BiPAP, responsive to verbal stimulus  Does state he feels anxious  Records reviewed.         Temp (24hrs), Av.3 °F (36.8 °C), Min:97.3 °F (36.3 °C), Max:99.9 °F (37.7 °C)    DIET: Diet NPO  ADULT TUBE FEEDING; Nasogastric; Standard with Fiber; Continuous; 10; Yes; 10; Q 4 hours; 45; 30; Q 4 hours  CODE: Full Code    Intake/Output Summary (Last 24 hours) at 2025 1053  Last data filed at 2025 0600  Gross per 24 hour   Intake 491 ml   Output 850 ml   Net -359 ml       Review of Systems  Could not be assessed properly      OBJECTIVE:    /65   Pulse 91   Temp 97.3 °F (36.3 °C) (Temporal)   Resp (!) 31   Ht 1.778 m (5' 10\")   Wt 116.2 kg (256 lb 3.2 oz)   SpO2 100%   BMI 36.76 kg/m²     General appearance: Lethargic  Using BiPAP  HEENT:  Conjunctivae/corneas clear.   Neck: Supple. No jugular venous distention.   Respiratory: symmetrical; clear to auscultation bilaterally; bibasilar crepitation  Cardiovascular: 
    Hospitalist Progress Note      SYNOPSIS: Patient admitted on 2025 for Hypoxia  59-year-old male with past medical history of alcoholic liver disease, aortic valve replacement, mitral valve replacement presented to the hospital with chief complaint of shortness of breath, was saturating 78% on room air, patient also had history of falls, is a chronic drinker.  Vitals blood pressure 102/74, afebrile, pulse 89.SpO2 100% on 6 L nasal cannula. Laboratory studies demonstrate sodium 126, potassium 2.6, BUN 39, creatinine 2.6, anion gap 20, lactic acid 6.2, proBNP 1241, troponin 214 with a repeat of 199 and a hemoglobin of 7.0. Patient was placed on BiPAP in the emergency department  Patient's hemoglobin dropped, patient received 2 unit of packed RBC transfusion during his hospital stay, GI was consulted for GI bleed, cardiology was consulted for CHF   transferred to MICU for alcohol withdrawal with DT   received 1 unit of packed RBC transfusion      SUBJECTIVE:  Patient seen and examined at bedside today a.m. still using BiPAP, still lethargic, not answering to my questions appropriately  Discussed with nurse present at bedside  Patient receiving 1 unit of packed RBC  Records reviewed.     Temp (24hrs), Av.9 °F (36.6 °C), Min:97.7 °F (36.5 °C), Max:98 °F (36.7 °C)    DIET: Diet NPO  ADULT TUBE FEEDING; Nasogastric; Standard with Fiber; Continuous; 10; Yes; 10; Q 4 hours; 60; 300; Q 4 hours; Protein; 1 Dose; Daily  CODE: Full Code    Intake/Output Summary (Last 24 hours) at 2025 1139  Last data filed at 2025 1000  Gross per 24 hour   Intake 2471.58 ml   Output 1850 ml   Net 621.58 ml       Review of Systems  Could not be assessed properly      OBJECTIVE:    BP (!) 104/58   Pulse 84   Temp 97.9 °F (36.6 °C) (Axillary)   Resp 28   Ht 1.778 m (5' 10\")   Wt 116.6 kg (257 lb)   SpO2 95%   BMI 36.88 kg/m²     General appearance: Lethargic  Using BiPAP  HEENT:  Conjunctivae/corneas clear. 
    Hospitalist Progress Note      SYNOPSIS: Patient admitted on 2025 for Hypoxia  59-year-old male with past medical history of alcoholic liver disease, aortic valve replacement, mitral valve replacement presented to the hospital with chief complaint of shortness of breath, was saturating 78% on room air, patient also had history of falls, is a chronic drinker.  Vitals blood pressure 102/74, afebrile, pulse 89.SpO2 100% on 6 L nasal cannula. Laboratory studies demonstrate sodium 126, potassium 2.6, BUN 39, creatinine 2.6, anion gap 20, lactic acid 6.2, proBNP 1241, troponin 214 with a repeat of 199 and a hemoglobin of 7.0. Patient was placed on BiPAP in the emergency department  Patient's hemoglobin dropped, patient received 2 unit of packed RBC transfusion during his hospital stay, GI was consulted for GI bleed, cardiology was consulted for CHF   transferred to MICU for alcohol withdrawal with DT   received 1 unit of packed RBC transfusion      hyponatremic increase free water flushes    SUBJECTIVE:  Patient seen and examined at bedside today a.m. still on BiPAP lethargic, not answering to my questions appropriately  Sodium is trending up  Records reviewed.       Temp (24hrs), Av.6 °F (37 °C), Min:98 °F (36.7 °C), Max:98.9 °F (37.2 °C)    DIET: Diet NPO  ADULT TUBE FEEDING; Nasogastric; Standard with Fiber; Continuous; 10; Yes; 10; Q 4 hours; 60; 500; Q 4 hours; Protein; 1 Dose; Daily  CODE: Full Code    Intake/Output Summary (Last 24 hours) at 2025 1208  Last data filed at 2025 0907  Gross per 24 hour   Intake 3427.08 ml   Output 1600 ml   Net 1827.08 ml       Review of Systems  Could not be assessed properly      OBJECTIVE:    /73   Pulse 93   Temp 98.9 °F (37.2 °C) (Temporal)   Resp (!) 39   Ht 1.778 m (5' 10\")   Wt 116.9 kg (257 lb 12.8 oz)   SpO2 100%   BMI 36.99 kg/m²     General appearance: Lethargic  Using BiPAP  HEENT:  Conjunctivae/corneas clear.   Neck: 
    INPATIENT CARDIOLOGY FOLLOW-UP    Name: Len Dorsey    Age: 59 y.o.    Date of Admission: 2/17/2025  4:14 PM    Date of Service: 2/21/2025    Primary Cardiologist: Dr. Bustos    Chief Complaint: Follow-up for decompensated heart failure    Interim History:  No new overnight cardiac complaints. Currently with no complaints of CP, SOB, palpitations, dizziness, or lightheadedness. SR on telemetry.    Urine output not accurately documented.  Reports good urine output.  Feeling better.    Review of Systems:   Negative except as described above    Problem List:  Patient Active Problem List   Diagnosis    Chest pain    Closed fracture of nasal bones    Injury of face    Retroperitoneal hematoma    Vitamin D deficiency    Acute chest pain    Acute cholecystitis    Gastrointestinal hemorrhage    Polyp of colon    Right upper quadrant abdominal pain    Diarrhea    Current moderate episode of major depressive disorder without prior episode (HCC)    Hypertension    S/P MVR (mitral valve replacement)    S/P AVR    Coronary artery disease involving native coronary artery of native heart without angina pectoris    Paravalvular leak (prosthetic valve)    Hx of CABG    Warfarin anticoagulation    Supratherapeutic INR    Moderate obesity    Dyspnea    Alcohol withdrawal syndrome without complication (HCC)    DTs (delirium tremens) (HCC)    Anemia requiring transfusions    Severe protein-calorie malnutrition    Acute decompensated heart failure (HCC)    Aortic valve disease    Mitral valve disease    AV block    Pure hypercholesterolemia    Chronic obstructive pulmonary disease (HCC)    Loculated pleural effusion    Troponin level elevated    Stage 2 chronic kidney disease    Alcohol use    Breast swelling    Pleural effusion    Hypophosphatemia    Acute congestive heart failure (HCC)    FRANTZ (acute kidney injury)    Acidosis    Hyperglycemia    Hyperlipidemia    Alcoholic liver disease, unspecified    Thrombocytopenia    
    INPATIENT CARDIOLOGY FOLLOW-UP    Name: Len Dorsey    Age: 59 y.o.    Date of Admission: 2/17/2025  4:14 PM    Date of Service: 2/24/2025    Primary Cardiologist: Akash    Chief Complaint: Follow-up for CHF/VHD    Interim History:  Unable to obtain any history due to sedation.  Just finished a blood transfusion    Review of Systems:       Problem List:  Patient Active Problem List   Diagnosis    Chest pain    Closed fracture of nasal bones    Injury of face    Retroperitoneal hematoma    Vitamin D deficiency    Acute chest pain    Acute cholecystitis    Gastrointestinal hemorrhage    Polyp of colon    Right upper quadrant abdominal pain    Diarrhea    Current moderate episode of major depressive disorder without prior episode (HCC)    Hypertension    S/P MVR (mitral valve replacement)    S/P AVR    Coronary artery disease involving native coronary artery of native heart without angina pectoris    Paravalvular leak (prosthetic valve)    Hx of CABG    Warfarin anticoagulation    Supratherapeutic INR    Moderate obesity    Dyspnea    Alcohol withdrawal syndrome without complication (HCC)    DTs (delirium tremens) (HCC)    Anemia requiring transfusions    Severe protein-calorie malnutrition    Acute decompensated heart failure (HCC)    Aortic valve disease    Mitral valve disease    AV block    Pure hypercholesterolemia    Chronic obstructive pulmonary disease (HCC)    Loculated pleural effusion    Troponin level elevated    Stage 2 chronic kidney disease    Alcohol use    Breast swelling    Pleural effusion    Hypophosphatemia    Acute congestive heart failure (HCC)    FRANTZ (acute kidney injury)    Acidosis    Hyperglycemia    Hyperlipidemia    Alcoholic liver disease, unspecified    Thrombocytopenia    Delirium    Acute blood loss anemia    Heart failure with mid-range ejection fraction (HFmEF) (HCC)    Acute on chronic combined systolic and diastolic congestive heart failure (HCC)    Normally functioning 
    INPATIENT CARDIOLOGY FOLLOW-UP    Name: Len Dorsey    Age: 59 y.o.    Date of Admission: 2/17/2025  4:14 PM    Date of Service: 2/26/2025    Primary Cardiologist: Akash    Chief Complaint: Follow-up for CHF/VHD    Interim History:  Interim events noted was intubated yesterday 2/25.    Review of Systems:       Problem List:  Patient Active Problem List   Diagnosis    Chest pain    Closed fracture of nasal bones    Injury of face    Retroperitoneal hematoma    Vitamin D deficiency    Acute chest pain    Acute cholecystitis    Gastrointestinal hemorrhage    Polyp of colon    Right upper quadrant abdominal pain    Diarrhea    Current moderate episode of major depressive disorder without prior episode (HCC)    Hypertension    S/P MVR (mitral valve replacement)    S/P AVR    Coronary artery disease involving native coronary artery of native heart without angina pectoris    Paravalvular leak (prosthetic valve)    Hx of CABG    Warfarin anticoagulation    Supratherapeutic INR    Moderate obesity    Dyspnea    Alcohol withdrawal syndrome without complication (HCC)    DTs (delirium tremens) ([facility])    Anemia requiring transfusions    Severe protein-calorie malnutrition    Acute decompensated heart failure (HCC)    Aortic valve disease    Mitral valve disease    CHB (complete heart block) (HCC)    Pure hypercholesterolemia    Chronic obstructive pulmonary disease (HCC)    Loculated pleural effusion    Troponin level elevated    Stage 2 chronic kidney disease    Alcohol use    Breast swelling    Pleural effusion    Hypophosphatemia    Acute congestive heart failure (HCC)    FRANTZ (acute kidney injury)    Acidosis    Hyperglycemia    Hyperlipidemia    Alcoholic liver disease, unspecified    Thrombocytopenia    Delirium    Acute blood loss anemia    Heart failure with mid-range ejection fraction (HFmEF) (HCC)    Acute on chronic combined systolic and diastolic congestive heart failure (HCC)    Normally functioning cardiac 
    Inpatient Cardiology Progress note        SUBJECTIVE/OBJECTIVE:   Patient continues to be altered and is minimally alert. Transitioned from BiPAP to nasal cannula      PHYSICAL EXAM:   BP (!) 98/58   Pulse (!) 101   Temp 97.8 °F (36.6 °C) (Temporal)   Resp 17   Ht 1.778 m (5' 10\")   Wt 116.2 kg (256 lb 3.2 oz)   SpO2 100%   BMI 36.76 kg/m²    B/P Range last 24 hours: Systolic (24hrs), Av , Min:88 , Max:128    Diastolic (24hrs), Av, Min:51, Max:77      GENERAL: Not in distress.   HEAD: Normocephalic, Atraumatic.   NECK: No JVD. No carotid bruits. No neck masses.?   CARDIOVASCULAR: Normal rate, regular rhythm, mechanical heart sounds, no MRG    LUNGS: mild crackles bilaterally, no wheezing.?   ABDOMEN: Abdomen is soft and not distended. No tenderness.  EXTREMITIES:There is no pedal edema.   MUSCULOSKELETAL: No joint swelling. Normal range of motion?   SKIN: Skins is moist. No ulcers or lesions.   NEUROLOGICAL: Conscious, alert, oriented to time, place and person. No evidence of obvious neurological deficits.?       Intake/Output Summary (Last 24 hours) at 2025 1934  Last data filed at 2025 1800  Gross per 24 hour   Intake 1026 ml   Output 700 ml   Net 326 ml       Weight:   Wt Readings from Last 3 Encounters:   25 116.2 kg (256 lb 3.2 oz)   24 105 kg (231 lb 7.7 oz)   24 107.9 kg (237 lb 14 oz)       Current Inpatient Medications:   arformoterol tartrate  15 mcg Nebulization BID RT    budesonide  0.5 mg Nebulization BID RT    ipratropium 0.5 mg-albuterol 2.5 mg  1 Dose Inhalation Q4H WA RT    potassium bicarb-citric acid  20 mEq Oral Once    folic acid  1 mg IntraVENous Daily    ampicillin-sulbactam  3,000 mg IntraVENous Q6H    sodium chloride flush  5-40 mL IntraVENous 2 times per day    thiamine  250 mg IntraVENous Daily    [START ON 3/1/2025] thiamine  100 mg IntraVENous Daily    PHENobarbital  32.5 mg IntraVENous Q12H    Followed by    [START ON 2025] 
   02/18/25 2241   NIV Type   NIV Started/Stopped Off  (refused to  wear the bipap tonight)       
   02/21/25 0120   NIV Type   Mode (S)  AVAPS   Mask Type Full face mask   Mask Size Large   Bonnet size Large   Assessment   Level of Consciousness 0   Comfort Level Good   Using Accessory Muscles No   Mask Compliance Good   Skin Protection for O2 Device Yes   Settings/Measurements   PIP Observed 22 cm H20   CPAP/EPAP 6 cmH2O   Vt (Set, mL) (S)  500 mL   Vt (Measured) 501 mL   Rate Ordered 16   Insp Rise Time (%) 3 %   FiO2  50 %   I Time/ I Time % 0.8 s   Minute Volume (L/min) 12 Liters   Mask Leak (lpm) 50 lpm   Patient's Home Machine No   Alarm Settings   Alarms On Y   Apnea (secs) 20 secs     Date: 2/21/2025    Time: 1:24 AM    Patient Placed On BIPAP/CPAP/ Non-Invasive Ventilation?  Yes    If no must comment.  Facial area red/color change? No           If YES are Blister/Lesion present?No   If yes must notify nursing staff  BIPAP/CPAP skin barrier?  Yes    Skin barrier type:mepilexlite       Comments:        Yenny Jon RCP  
   02/22/25 0930   NIV Type   NIV Started/Stopped On   Mode AVAPS   Mask Type Full face mask   Mask Size Large   Assessment   Pulse 97   Respirations 22   SpO2 100 %   Breath Sounds   Right Upper Lobe Diminished   Right Middle Lobe Diminished   Right Lower Lobe Diminished   Left Upper Lobe Diminished   Left Lower Lobe Diminished   Settings/Measurements   PIP Observed 20 cm H20   CPAP/EPAP 6 cmH2O   IPAP Min 18 cmH2O   IPAP Max 30 cmH2O   Vt (Set, mL) 500 mL   Vt (Measured) 531 mL   Rate Ordered 16   Insp Rise Time (%) 3 %   FiO2  (S)  40 %   I Time/ I Time % 0.8 s   Minute Volume (L/min) 18.6 Liters   Mask Leak (lpm) 38 lpm   Patient's Home Machine No   Alarm Settings   Alarms On Y   Low Pressure (cmH2O) 8 cmH2O   High Pressure (cmH2O) 40 cmH2O   RR Low (bpm) 16   RR High (bpm) 40 br/min   Oxygen Therapy/Pulse Ox   O2 Device PAP (positive airway pressure)       
   03/04/25 0913   Patient Observation   Pulse 68   Respirations 19   SpO2 96 %   Vent Information   Ventilator ID [id number]   Equipment Changed (S)  Expiratory Filter   Vent Mode AC/VC   Ventilator Settings   FiO2  35 %   Vt (Set, mL) 450 mL   Resp Rate (Set) 12 bpm   PEEP/CPAP (cmH2O) 8   Peak Inspiratory Flow (Set) 55 L/sec   Vent Patient Data (Readings)   Vt (Measured) 471 mL   Peak Inspiratory Pressure (cmH2O) 28 cmH2O   Rate Measured 17 br/min   Minute Volume (L/min) 7.76 Liters   Peak Inspiratory Flow (lpm) 55 L/sec   Mean Airway Pressure (cmH2O) 14 cmH20   Plateau Pressure (cm H2O) 27 cm H2O   Driving Pressure 19   I:E Ratio 1:1.60   Flow Sensitivity 3 L/min   PEEP Intrinsic (cm H2O) 0.6 cm H2O   Static Compliance (L/cm H2O) 22   Backup Apnea On   Backup Rate 12 Breaths Per Minute   Backup Vt 450   Vent Alarm Settings   High Pressure (cmH2O) 50 cmH2O   Low Minute Volume (lpm) 5 L/min   High Minute Volume (lpm) 18 L/min   Low Exhaled Vt (ml) 350 mL   High Exhaled Vt (ml) 800 mL   RR High (bpm) 35 br/min   Apnea (secs) 20 secs   Additional Respiratoray Assessments   Humidification Source Heated wire   Humidification Temp 36.8   Ambu Bag With Mask At Bedside Yes   ETT    Placement Date/Time: 02/25/25 1250   Placement Verified By: Auscultation;Capnometry;Chest X-ray;Direct visualization  Preoxygenation: Yes  Airway Type: Cuffed  Airway Tube Size: 8 mm  Location: Oral  Secured At: 24 cm   Secured At 24 cm   Measured From Lips   ETT Placement Center   Secured By Commercial tube hinton   Site Assessment Dry       
   03/04/25 1207   Patient Observation   Pulse 77   Respirations 26   SpO2 94 %   Vent Information   Ventilator ID [id number]   Vent Mode AC/VC   Ventilator Settings   FiO2  35 %   Vt (Set, mL) 450 mL   Resp Rate (Set) 12 bpm   PEEP/CPAP (cmH2O) 8   Peak Inspiratory Flow (Set) 55 L/sec   Vent Patient Data (Readings)   Vt (Measured) 434 mL   Peak Inspiratory Pressure (cmH2O) 31 cmH2O   Rate Measured 26 br/min   Minute Volume (L/min) 11.8 Liters   Peak Inspiratory Flow (lpm) 55 L/sec   Mean Airway Pressure (cmH2O) 17 cmH20   Plateau Pressure (cm H2O) 25 cm H2O   Driving Pressure 17   I:E Ratio 1:1.30   Flow Sensitivity 3 L/min   Static Compliance (L/cm H2O) 24   Backup Apnea On   Backup Rate 12 Breaths Per Minute   Backup Vt 450   Vent Alarm Settings   High Pressure (cmH2O) 50 cmH2O   Low Minute Volume (lpm) 5 L/min   High Minute Volume (lpm) 18 L/min   Low Exhaled Vt (ml) 350 mL   High Exhaled Vt (ml) 800 mL   RR High (bpm) 35 br/min   Apnea (secs) 20 secs   Additional Respiratoray Assessments   Humidification Source Heated wire   Humidification Temp 36.2   Ambu Bag With Mask At Bedside Yes   ETT    Placement Date/Time: 02/25/25 1250   Placement Verified By: Auscultation;Capnometry;Chest X-ray;Direct visualization  Preoxygenation: Yes  Airway Type: Cuffed  Airway Tube Size: 8 mm  Location: Oral  Secured At: 24 cm   Secured At 24 cm   Measured From Lips   ETT Placement Center   Secured By Commercial tube hinton   Site Assessment Dry       
   03/04/25 1224   Patient Observation   Pulse 77   SpO2 95 %   Vent Information   Ventilator Day(s) 7   Ventilator -25   Vent Mode CPAP/PS   Ventilator Settings   FiO2  35 %   PEEP/CPAP (cmH2O) 8   Pressure Support (cm H2O) 10 cm H2O   Vent Patient Data (Readings)   Vt (Measured) 306 mL   Peak Inspiratory Pressure (cmH2O) 19 cmH2O   Rate Measured 26 br/min   Minute Volume (L/min) 7.95 Liters   Mean Airway Pressure (cmH2O) 11 cmH20   Plateau Pressure (cm H2O) 25 cm H2O   Driving Pressure 17   I:E Ratio 1:3.00   Vent Alarm Settings   High Pressure (cmH2O) 50 cmH2O   Low Minute Volume (lpm) 5 L/min   Low Exhaled Vt (ml) 250 mL   RR High (bpm) 35 br/min   B: Both Spontaneous Awakening and Breathing Trials   Did Patient Receive Sedative and/or Opioid IV Medications in the Last 24 Hours Continuously infused meds for sedation   Safety Screening Spontaneous Awakening Trial (SAT) Proceed with SAT - no exclusion criteria met   Spontaneous Awakening Trial (SAT) Outcome SAT passed   Was Patient Receiving Mechanical Ventilation Yes   Safety Screening Spontaneous Breathing Trial (SBT) Proceed with SBT - no exclusion criteria met     Sbt started  
   03/04/25 1247   Patient Observation   Pulse 83   SpO2 96 %   Vent Information   Vent Mode CPAP/PS   Ventilator Settings   FiO2  35 %   PEEP/CPAP (cmH2O) 8   Pressure Support (cm H2O) 5 cm H2O   Vent Patient Data (Readings)   Vt (Measured) 276 mL   Peak Inspiratory Pressure (cmH2O) 19 cmH2O   Rate Measured 35 br/min   Minute Volume (L/min) 8.68 Liters   Mean Airway Pressure (cmH2O) 12 cmH20   Plateau Pressure (cm H2O) 25 cm H2O   Driving Pressure 17   I:E Ratio 1:2.00   Vent Alarm Settings   High Pressure (cmH2O) 50 cmH2O   Low Exhaled Vt (ml) 0 mL   Weaning Parameters   Spontaneous Breathing Trial Complete Yes   Respiratory Rate Observed 35   Ve 8.68      RSBI 141   NIF -32     End of Sbt  parameters done  RSBI at 141  pt placed back on A/c  
   03/04/25 1254   B: Both Spontaneous Awakening and Breathing Trials   Spontaneous Breathing Trial (SBT) Outcome (S)  RSBI>105 - SBT failure     Py back to a/c  
  Gastroenterology, Hepatology, &  Advanced Endoscopy    Progress Note      HPI:   Patient found to be in DT on floor. He is being transferred to MICU for further management given the amount of medication required on the floor.     He has been on CIWA protocol and is also getting phenobarbital per report. He is altered from medications given at the time of assessment.    Lab Results   Component Value Date    INR 1.6 03/10/2025    INR 1.6 03/09/2025    INR 1.7 03/08/2025    PROTIME 17.3 (H) 03/10/2025    PROTIME 17.9 (H) 03/09/2025    PROTIME 18.7 (H) 03/08/2025         ALT   Date Value Ref Range Status   03/10/2025 30 0 - 40 U/L Final   03/09/2025 33 0 - 40 U/L Final   03/08/2025 30 0 - 40 U/L Final     AST   Date Value Ref Range Status   03/10/2025 48 (H) 0 - 39 U/L Final   03/09/2025 64 (H) 0 - 39 U/L Final   03/08/2025 59 (H) 0 - 39 U/L Final     Comment:     SPECIMEN SLIGHTLY HEMOLYZED, RESULTS MAY BE ADVERSELY AFFECTED.     Alkaline Phosphatase   Date Value Ref Range Status   03/10/2025 251 (H) 40 - 129 U/L Final   03/09/2025 250 (H) 40 - 129 U/L Final   03/08/2025 225 (H) 40 - 129 U/L Final     Total Bilirubin   Date Value Ref Range Status   03/10/2025 2.0 (H) 0.0 - 1.2 mg/dL Final   03/09/2025 2.2 (H) 0.0 - 1.2 mg/dL Final   03/08/2025 2.4 (H) 0.0 - 1.2 mg/dL Final     Bilirubin, Direct   Date Value Ref Range Status   02/21/2025 2.3 (H) 0.0 - 0.3 mg/dL Final   02/20/2025 1.8 (H) 0.0 - 0.3 mg/dL Final   08/11/2024 0.3 0.0 - 0.3 mg/dL Final      Lab Results   Component Value Date    WBC 11.7 (H) 03/10/2025    HGB 9.2 (L) 03/10/2025    HCT 30.1 (L) 03/10/2025    PLT 92 (L) 02/26/2025     03/10/2025    K 3.4 (L) 03/10/2025     03/10/2025    CREATININE 2.2 (H) 03/10/2025    BUN 30 (H) 03/10/2025    CO2 24 03/10/2025    FOLATE 5.3 02/22/2025    [id number] >2000 (H) 02/22/2025    AMMONIA 33 02/22/2025    GLUCOSE 108 (H) 03/10/2025    INR 1.6 03/10/2025    PROTIME 17.3 (H) 03/10/2025    TSH 1.69 
  Gastroenterology, Hepatology, &  Advanced Endoscopy    Progress Note    Subjective: Due to wife working, and patient not driving, they were seeking a PCP in [place] instead of [place]. They were interested in the Internal Medicine clinic upon discharge.    HPI:   Patient found to be in DT on floor. He is being transferred to MICU for further management given the amount of medication required on the floor.     He has been on CIWA protocol and is also getting phenobarbital per report. He is altered from medications given at the time of assessment.    Lab Results   Component Value Date    INR 1.8 03/12/2025    INR 1.8 03/11/2025    INR 1.6 03/11/2025    PROTIME 20.1 (H) 03/12/2025    PROTIME 19.7 (H) 03/11/2025    PROTIME 18.0 (H) 03/11/2025         ALT   Date Value Ref Range Status   03/12/2025 28 0 - 40 U/L Final   03/11/2025 27 0 - 40 U/L Final   03/10/2025 30 0 - 40 U/L Final     AST   Date Value Ref Range Status   03/12/2025 57 (H) 0 - 39 U/L Final   03/11/2025 51 (H) 0 - 39 U/L Final   03/10/2025 48 (H) 0 - 39 U/L Final     Alkaline Phosphatase   Date Value Ref Range Status   03/12/2025 237 (H) 40 - 129 U/L Final   03/11/2025 229 (H) 40 - 129 U/L Final   03/10/2025 251 (H) 40 - 129 U/L Final     Total Bilirubin   Date Value Ref Range Status   03/12/2025 1.7 (H) 0.0 - 1.2 mg/dL Final   03/11/2025 1.7 (H) 0.0 - 1.2 mg/dL Final   03/10/2025 2.0 (H) 0.0 - 1.2 mg/dL Final     Bilirubin, Direct   Date Value Ref Range Status   02/21/2025 2.3 (H) 0.0 - 0.3 mg/dL Final   02/20/2025 1.8 (H) 0.0 - 0.3 mg/dL Final   08/11/2024 0.3 0.0 - 0.3 mg/dL Final      Lab Results   Component Value Date    WBC 9.2 03/12/2025    HGB 9.5 (L) 03/12/2025    HCT 31.0 (L) 03/12/2025     03/12/2025     03/12/2025    K 3.5 03/12/2025     03/12/2025    CREATININE 2.1 (H) 03/12/2025    BUN 33 (H) 03/12/2025    CO2 23 03/12/2025    FOLATE 5.3 02/22/2025    [id number] >2000 (H) 02/22/2025    AMMONIA 33 02/22/2025    GLUCOSE 
  Gastroenterology, Hepatology, &  Advanced Endoscopy    Progress Note    Subjective: No new complaints today.   HPI:   Patient found to be in DT on floor. He is being transferred to MICU for further management given the amount of medication required on the floor.     He has been on CIWA protocol and is also getting phenobarbital per report. He is altered from medications given at the time of assessment.    Lab Results   Component Value Date    INR 1.7 03/13/2025    INR 1.8 03/12/2025    INR 1.8 03/11/2025    PROTIME 19.0 (H) 03/13/2025    PROTIME 20.1 (H) 03/12/2025    PROTIME 19.7 (H) 03/11/2025         ALT   Date Value Ref Range Status   03/13/2025 34 0 - 40 U/L Final   03/12/2025 28 0 - 40 U/L Final   03/11/2025 27 0 - 40 U/L Final     AST   Date Value Ref Range Status   03/13/2025 70 (H) 0 - 39 U/L Final   03/12/2025 57 (H) 0 - 39 U/L Final   03/11/2025 51 (H) 0 - 39 U/L Final     Alkaline Phosphatase   Date Value Ref Range Status   03/13/2025 250 (H) 40 - 129 U/L Final   03/12/2025 237 (H) 40 - 129 U/L Final   03/11/2025 229 (H) 40 - 129 U/L Final     Total Bilirubin   Date Value Ref Range Status   03/13/2025 1.8 (H) 0.0 - 1.2 mg/dL Final   03/12/2025 1.7 (H) 0.0 - 1.2 mg/dL Final   03/11/2025 1.7 (H) 0.0 - 1.2 mg/dL Final     Bilirubin, Direct   Date Value Ref Range Status   02/21/2025 2.3 (H) 0.0 - 0.3 mg/dL Final   02/20/2025 1.8 (H) 0.0 - 0.3 mg/dL Final   08/11/2024 0.3 0.0 - 0.3 mg/dL Final      Lab Results   Component Value Date    WBC 8.0 03/13/2025    HGB 9.7 (L) 03/13/2025    HCT 31.0 (L) 03/13/2025     03/13/2025     03/13/2025    K 3.7 03/13/2025     03/13/2025    CREATININE 1.9 (H) 03/13/2025    BUN 32 (H) 03/13/2025    CO2 23 03/13/2025    FOLATE 5.3 02/22/2025    [id number] >2000 (H) 02/22/2025    AMMONIA 33 02/22/2025    GLUCOSE 109 (H) 03/13/2025    INR 1.7 03/13/2025    PROTIME 19.0 (H) 03/13/2025    TSH 1.69 02/18/2025    LABA1C 4.9 05/08/2024     MELD 3.0: 21 at 3/13/2025 
  OT consult received and appreciated. Chart reviewed, discussed at AM rounds and consulted RN. Pt is not appropriate for occupational therapy at this time- pt does not follow commands. Will evaluate at a later time.     Camille Jensen, OTR/L # [id number]     
  P Quality Flow/Interdisciplinary Rounds Progress Note        Quality Flow Rounds held on March 5, 2025    Disciplines Attending:  Nursing Unit Leadership    Len Dorsey was admitted on 2/17/2025  4:14 PM    Anticipated Discharge Date:       Disposition:       Jasvir Score:  Jasvir Scale Score: 13    [facility] RISK OF UNPLANNED READMISSION 2.0             24.8 Total Score        Discussed patient goal for the day, patient clinical progression, and barriers to discharge.  The following Goal(s) of the Day/Commitment(s) have been identified wean off ventilator as tolerated       Vincent Elmore RN  March 5, 2025       
  [facility]  Department of Pulmonary, Critical Care and Sleep Medicine  [facility] [place]  Department of Internal Medicine  Progress Note    SUBJECTIVE:    No CP,   SOB improved  Tolerating current oxygen therapy   Reviewed overnight events with staff.  Checked the monitors & laboratory tests.  No complaints of pain at this time.     OBJECTIVE:  Vitals:    03/08/25 2000 03/08/25 2038 03/09/25 0744 03/09/25 0823   BP: 127/70  (!) 103/58    Pulse: 95 93 92 96   Resp: 20 18 19 18   Temp: 97.7 °F (36.5 °C)  98.2 °F (36.8 °C)    TempSrc: Oral  Axillary    SpO2: 91% (!) 86% 99% 96%   Weight:       Height:         Constitutional: Alert,     EENT: EOMI VIN. MMM. No icterus. No thrush.     Neck: No thyromegaly. No elevated JVP. Trachea was midline.   Respiratory: Symmetrical.  Breath sounds were clear.    Cardiovascular: Regular, No murmur. No rubs.      Pulses:  Equal bilaterally.    Abdomen: Soft without organomegaly. No rebound, rigidity.  No guarding.  Lymphatic: No lymphadenopathy.  Musculoskeletal: Without weakness or gross deficits  Extremities:  No lower extremity edema. Reflexes appear adequate.   Skin:  Warm and dry.  No skin rashes.   Neurological/Psychiatric: No acute psychosis. Cranial nerves are intact.        DATA:  The data collected below information that was obtained, reviewed, analyzed and interpreted today. Imaging test are reviewed with the radiologist during weekly conference rounds. Comparison to previous images are always explored.     Monitor Strips:  My interpretation and reviewed of the cardiac monitor and further discussion with technical team reveals no changes noted and the patient is in sinus rhythm       CBC:   Lab Results   Component Value Date/Time    WBC 9.1 03/09/2025 05:29 AM    RBC 2.95 03/09/2025 05:29 AM    HGB 9.1 03/09/2025 05:29 AM    HCT 30.6 03/09/2025 05:29 AM    .7 03/09/2025 05:29 AM    MCH 30.8 03/09/2025 05:29 AM 
  SPEECH/LANGUAGE PATHOLOGY  CLINICAL ASSESSMENT OF SWALLOWING FUNCTION   and PLAN OF CARE      PATIENT NAME:  Len Dorsey  (male)     MRN:  [id number]    :  1965  (59 y.o.)  STATUS:  Inpatient: Room 4414/4414-A    TODAY'S DATE:  3/6/2025  ORDER DATE, DESCRIPTION AND REFERRING PROVIDER: 25 1600    SLP swallowing-dysphagia evaluation and treatment  Start:  25 1600,   End:  25 1600,   ONE TIME,   Standing Count:  1 Occurrences,   R       Taiwo Sullivan MD  REASON FOR REFERRAL:  S/p extubation, failed bedside swallow  EVALUATING THERAPIST: Stephanie Grissom, ITA                 RESULTS:    DYSPHAGIA DIAGNOSIS:   Clinical indicators of moderate oropharyngeal phase dysphagia       DIET RECOMMENDATIONS:  NPO with ongoing PO analysis by SLP only to determine if PO diet can be initiated         FEEDING RECOMMENDATIONS:     Assistance level:  Not applicable      Compensatory strategies recommended: No strategies are recommended at this time      Discussed recommendations with:  Patient  and patient nurse in person    SPEECH THERAPY  PLAN OF CARE   The dysphagia POC is established based on physician order, dysphagia diagnosis and results of clinical assessment     Skilled SLP intervention for dysphagia management on acute care up to 5 x per week until goals met, pt plateaus in function and/or discharged from hospital    Conditions Requiring Skilled Therapeutic Intervention for dysphagia:    Patient is performing below functional baseline d/t  current acute condition, respiratory compromise, multiple medications, and/or increased dependency upon caregivers.  Underlying moist cough decreases ability to determine presence or absence of clinical indicators of dysphagia  patient recently extubated and research indicates edema and reduced sensation s/p extubation increases risk of aspiration    Specific dysphagia interventions to include:     ongoing evaluation of swallow function to determine when PO diet 
  SPEECH/LANGUAGE PATHOLOGY  CLINICAL ASSESSMENT OF SWALLOWING FUNCTION   and PLAN OF CARE      PATIENT NAME:  Len Dorsey  (male)     MRN:  [id number]    :  1965  (59 y.o.)  STATUS:  Inpatient: Room 4506/4506-A    TODAY'S DATE:  2025  ORDER DATE, DESCRIPTION AND REFERRING PROVIDER:    SLP eval and treat  Start:  25,   End:  25,   ONE TIME,   Standing Count:  1 Occurrences,   R       Chris Yusuf MD  REASON FOR REFERRAL: Coughing on water   EVALUATING THERAPIST: ITA Slade                 RESULTS:    DYSPHAGIA DIAGNOSIS:   Clinical indicators of mild-moderate oropharyngeal phase dysphagia     Patient has a known history of dysphagia. He will benefit from a modified barium swallow study to fully assess his oral and pharyngeal swallow function.       DIET RECOMMENDATIONS:  Soft and bite size consistency solids (IDDSI level 6) with  nectar consistency (mildly thick - IDDSI level 2) liquids     FEEDING RECOMMENDATIONS:     Assistance level:  Set-up is required for all oral intake  Encourage self-feeding as function allows      Compensatory strategies recommended: Thorough oral care to prevent colonization of oral bacteria, Upright in bed/ chair as tolerated, Effortful swallow  SINGLE cup sips, SMALL bites, NO STRAW      Discussed recommendations with:  charge nurse in person    SPEECH THERAPY  PLAN OF CARE   The dysphagia POC is established based on physician order, dysphagia diagnosis and results of clinical assessment     Skilled SLP intervention for dysphagia management on acute care up to 5 x per week until goals met, pt plateaus in function and/or discharged from hospital    Conditions Requiring Skilled Therapeutic Intervention for dysphagia:    Patient is performing below functional baseline d/t  current acute condition, respiratory compromise, multiple medications, and/or increased dependency upon caregivers.  Wet vocal quality during PO intake    Specific 
  SPEECH/LANGUAGE PATHOLOGY  Clinical Re-Assessment of Swallow Function    PATIENT NAME:  Len Dorsey  (male)     MRN:  [id number]    :  1965  (59 y.o.)      TODAY'S DATE:  3/7/2025    EVALUATING THERAPIST: ITA Slade                 RESULTS:    DYSPHAGIA DIAGNOSIS:   Clinical indicators of Clinical indicators of mild-moderate oropharyngeal phase dysphagia       DIET RECOMMENDATIONS:  NPO until MBSS can be completed  ; OK for ice chips (2-3 per hour) PRN s/p oral care to promote pharyngeal muscle use, decrease risk of further muscle disuse atrophy, and thin pharyngeal secretions. Monitor respiratory status and discontinue ice chips if concern arises.     Per chart review, patient has a previous medical history of dysphagia. His most recent modified barium swallow study occurred on 25. However, since his last study, patient was transferred to MICU for alcohol withdrawal with DT. Patient was intubated and extubated on 3/5/25. Currently, patient is confused but following directions. At bedside, his vocal quality appeared hoarse and he displayed a wet/gurgly cough with thin liquids as well as hard solid food. Patient is recommended for a modified barium swallow study to fully assess his oral and pharyngeal swallow function.      FEEDING RECOMMENDATIONS:     Assistance level:  Not applicable      Compensatory strategies recommended: Thorough oral care to prevent colonization of oral bacteria , Upright in bed/ chair as tolerated, and Effortful swallow      Discussed recommendations with nursing and/or faxed report to referring provider: Yes    RN cleared patient for participation in assessment     yes       PATIENT REPORT/COMPLAINT: denies difficulty swallowing  Current Diet Order:  ADULT TUBE FEEDING; Nasogastric; Standard with Fiber; Continuous; 10; Yes; 10; Q 4 hours; 60; 150; Q 1 hour; Protein; 1 Dose; Daily    PROCEDURE:  Consistencies Administered During the Evaluation   Liquids: thin 
  Speech Language Pathology  NAME:  Len Dorsey  :  1965  DATE: 2025  ROOM:  [address] [place] [address]-A    Pt unavailable at 0900 for Dysphagia therapy    REASON:  Other: Pt was transferred to the MICU last week. He is currently NPO and on vent. SLP plans to sign off on patient at this time. Please re-consult once his medical status improves.         Thank You,   Marcus Chapa MSCCC/SLP  Speech Language Pathologist  [id number]               
  Speech Language Pathology  NAME:  Len Dorsey  :  1965  DATE: 2025  ROOM:  [facility]A    Pt unavailable at 1130 for Dysphagia therapy    REASON:  HOLD per RN, Pt is NPO for EGD.        Thank You,   Marcus Chapa MSCCC/SLP  Speech Language Pathologist  [id number]               
  [facility]  Department of Pulmonary, Critical Care and Sleep Medicine  Pulmonary Health & Research [place]  Department of Internal Medicine  Progress Note    SUBJECTIVE:    No CP,   SOB improved  Tolerating current oxygen therapy   Reviewed overnight events with staff.  Checked the monitors & laboratory tests.  No complaints of pain at this time.     OBJECTIVE:  Vitals:    03/08/25 0600 03/08/25 0729 03/08/25 0851 03/08/25 1100   BP:  130/88     Pulse:  95 95    Resp:  18 18    Temp:  98.4 °F (36.9 °C)     TempSrc:  Axillary     SpO2:  95%  95%   Weight: 102.1 kg (225 lb 1.4 oz)      Height:         Constitutional: Alert,     EENT: EOMI VIN. MMM. No icterus. No thrush.     Neck: No thyromegaly. No elevated JVP. Trachea was midline.   Respiratory: Symmetrical.  Breath sounds were clear.    Cardiovascular: Regular, No murmur. No rubs.      Pulses:  Equal bilaterally.    Abdomen: Soft without organomegaly. No rebound, rigidity.  No guarding.  Lymphatic: No lymphadenopathy.  Musculoskeletal: Without weakness or gross deficits  Extremities:  No lower extremity edema. Reflexes appear adequate.   Skin:  Warm and dry.  No skin rashes.   Neurological/Psychiatric: No acute psychosis. Cranial nerves are intact.        DATA:  The data collected below information that was obtained, reviewed, analyzed and interpreted today. Imaging test are reviewed with the radiologist during weekly conference rounds. Comparison to previous images are always explored.     Monitor Strips:  My interpretation and reviewed of the cardiac monitor and further discussion with technical team reveals no changes noted and the patient is in sinus rhythm       CBC:   Lab Results   Component Value Date/Time    WBC 7.9 03/08/2025 06:59 AM    RBC 2.90 03/08/2025 06:59 AM    HGB 9.0 03/08/2025 06:59 AM    HCT 32.3 03/08/2025 06:59 AM    .4 03/08/2025 06:59 AM    MCH 31.0 03/08/2025 06:59 AM    MCHC 27.9 03/08/2025 
 Latest Reference Range & Units 02/18/25 18:40   Hemoglobin Quant 12.5 - 16.5 g/dL 6.7 (LL)   Hematocrit 37.0 - 54.0 % 21.4 (L)      Message sent to attending regarding critical lab values- coverage provided by KENYA Morales.   
1 unit of RBC completed. Repeat H& H ordered for 0300    Electronically signed by Evelyne Zapata RN on 2/19/2025 at 1:08 AM    
4 Eyes Skin Assessment     NAME:  Len Dorsey  YOB: 1965  MEDICAL RECORD NUMBER:  [id number]    The patient is being assessed for  Admission    I agree that at least one RN has performed a thorough Head to Toe Skin Assessment on the patient. ALL assessment sites listed below have been assessed.      Areas assessed by both nurses:    Head, Face, Ears, Shoulders, Back, Chest, Arms, Elbows, Hands, Sacrum. Buttock, Coccyx, Ischium, Legs. Feet and Heels, and Under Medical Devices         Does the Patient have a Wound? No noted wound(s)       Jasvir Prevention initiated by RN: Yes  Wound Care Orders initiated by RN: No    Pressure Injury (Stage 3,4, Unstageable, DTI, NWPT, and Complex wounds) if present, place Wound referral order by RN under : No    New Ostomies, if present place, Ostomy referral order under : No     Nurse 1 eSignature: Electronically signed by Amy Roper RN on 2/22/25 at 2:57 AM EST    **SHARE this note so that the co-signing nurse can place an eSignature**    Nurse 2 eSignature: Electronically signed by Rickey Adler RN on 2/22/25 at 2:59 AM EST  
4 Eyes Skin Assessment     NAME:  Len Dorsey  YOB: 1965  MEDICAL RECORD NUMBER:  [id number]    The patient is being assessed for  Admission    I agree that at least one RN has performed a thorough Head to Toe Skin Assessment on the patient. ALL assessment sites listed below have been assessed.      Areas assessed by both nurses:    Head, Face, Ears, Shoulders, Back, Chest, Arms, Elbows, Hands, Sacrum. Buttock, Coccyx, Ischium, and Legs. Feet and Heels        Does the Patient have a Wound? No noted wound(s)       Jasvir Prevention initiated by RN: Yes  Wound Care Orders initiated by RN: No    Pressure Injury (Stage 3,4, Unstageable, DTI, NWPT, and Complex wounds) if present, place Wound referral order by RN under : No    New Ostomies, if present place, Ostomy referral order under : No     Nurse 1 eSignature: Electronically signed by Evelyne Zapata RN on 2/18/25 at 11:35 PM EST    **SHARE this note so that the co-signing nurse can place an eSignature**    Nurse 2 eSignature: Electronically signed by Yelena Moody RN on 2/19/25 at 12:45 AM EST     
After speaking with attending physician, pt will need another speech evaluation before inserting a feeding tube. Speech was notified by RN.  
[facility]  Department of Internal Medicine   Internal Medicine Residency   MICU Progress Note    Patient:  Len Dorsey 59 y.o. male  MRN: [id number]     Date of Service: 3/5/2025    Allergy: Patient has no known allergies.    Subjective     Patient was seen at bedside this morning. He is awake and alert following all commands.  SBT today showed RSBI of 107, NIF of -43. He is extubated to 6 L of High flow oxygen by nasal cannula.     24h change: No acute overnight events   ROS: Denies Fever/chills/CP/SOB/N/V/D/C/Dysuria/Blood in stool or urine  Objective     VS: /60   Pulse 92   Temp 100.1 °F (37.8 °C) (Esophageal)   Resp 20   Ht 1.778 m (5' 10\")   Wt 119 kg (262 lb 5.6 oz)   SpO2 99%   BMI 37.64 kg/m²           I & O - 24hr:   Intake/Output Summary (Last 24 hours) at 3/5/2025 0722  Last data filed at 3/5/2025 0600  Gross per 24 hour   Intake 210 ml   Output 2300 ml   Net -2090 ml       Physical Exam:  General Appearance: Intubated, awake and alert   Neck: no adenopathy, no carotid bruit, no JVD, supple, symmetrical, trachea midline, and thyroid not enlarged, symmetric, no tenderness/mass/nodules  Lung: wheezes bilaterally  Heart: regular rate and rhythm, S1, S2 normal, no murmur, click, rub or gallop  Abdomen: soft, non-tender; bowel sounds normal; no masses,  no organomegaly  Extremities:  extremities normal, atraumatic, no cyanosis or edema  Musculoskeletal: No joint swelling, no muscle tenderness. ROM normal in all joints of extremities.   Neurologic: Mental status: Intubated, following all commands     Lines     site day    Art line   None    TLC R IJ 02/26/2025   PICC None    Hemoaccess None      Mechanical Ventilation:  Mode: A/C SIMV  PS  low tidal  TV: 450 ml RR: 12 PEEP 8 cmH2O FiO2 35    ABG:     Lab Results   Component Value Date/Time    PH 7.444 03/05/2025 05:04 AM    PCO2 33.7 03/05/2025 05:04 AM    PO2 71.8 03/05/2025 05:04 AM    HCO3 22.6 03/05/2025 05:04 AM    BE 
Attempted to give Pt report to RN at [facility] via phone call again. No response.   
Attempted to give Pt report to RN at [facility] via phone call x3. No response. Left a voice message.      Per Dr. Sly STORY ok to not give scheduled 1800 warfarin dose prior to discharge at [facility]. Pt INR was 3.5   
Attempted to place pt on SBT. Pt began breathing >40 breaths per minute, <250 tidal volume. Pt appears restless and agitated. Pt placed back on ACVC +. Notified resident, resident stated for RN to start precedex and to reattempt. Notified RN.   
Attending Physician Attestation: Dr. Aaron Rossi    Thank you very much for allowing me to see this patient in consultation and follow up.    I personally saw, examined and provided care for the patient. Radiographs, labs and medication list were reviewed by me independently. I spoke with bedside nursing, respiratory therapists and consultants. Critical care services and times documented are independent of procedures and multidisciplinary rounds with Residents. Additionally comprehensive, multidisciplinary rounds were conducted with the MICU team. The case was discussed in detail and plans for care were established. Review of Residents documentation was conducted and revisions were made as appropriate. I agree with the the above documented information.     Current Facility-Administered Medications   Medication Dose Route Frequency Provider Last Rate Last Admin    arformoterol tartrate (BROVANA) nebulizer solution 15 mcg  15 mcg Nebulization BID RT Caitlyn Sagastume MD   15 mcg at 02/22/25 0930    budesonide (PULMICORT) nebulizer suspension 500 mcg  0.5 mg Nebulization BID RT Caitlyn Sagastume MD   500 mcg at 02/22/25 0930    ipratropium 0.5 mg-albuterol 2.5 mg (DUONEB) nebulizer solution 1 Dose  1 Dose Inhalation Q4H WA RT Caitlyn Sagastume MD   1 Dose at 02/22/25 0930    potassium bicarb-citric acid (EFFER-K) effervescent tablet 20 mEq  20 mEq Oral Once Caitlyn Sagastume MD        folic acid injection 1 mg  1 mg IntraVENous Daily Jf Vasquez MD   1 mg at 02/22/25 0929    sodium chloride flush 0.9 % injection 5-40 mL  5-40 mL IntraVENous 2 times per day Caitlyn Sagastume MD   10 mL at 02/22/25 0930    sodium chloride flush 0.9 % injection 5-40 mL  5-40 mL IntraVENous PRN Caitlyn Sagastume MD        0.9 % sodium chloride infusion   IntraVENous PRN Caitlyn Sagastume MD        polyethylene glycol (GLYCOLAX) packet 17 g  17 g Oral Daily PRN Caitlyn Sagastume MD        
Blood consent signed and placed in soft chart     Electronically signed by Evelyne Zapata RN on 2/18/2025 at 8:28 PM    
C diff screening negative, isolation order discontinued.     Electronically signed by Lionel Rowe RN on 2/20/2025 at 4:29 PM    
CHG single lumen power midline catheter Placement 3/8/2025    Product number: [id number]   Lot Number: [id number]      Ultrasound: yes   Left Brachial vein:                Upper Arm Circumference: (CM) 32    Size:(FR)/GUAGE 4.5/15 cm    Exposed Length: (CM) 3    Internal Length: (CM) 12   Cut: (CM) 0   Vein Measurement: 0.52 cm              Lidocaine Given: yes lidocaine 1% (from midline kit)                Procedure performed by KAI Donnelly RN  3/8/2025  11:06 AM                          
Call placed to lab regarding morning labs not being drawn.   
Called central supply to ask for seizure pads for bed rails, per central, \"we don't have any down here.\" This RN placed blankets around bed rails instead     Electronically signed by Evelyne Zapata RN on 2/19/2025 at 2:09 AM    
Called lab -- they received no labs this morning, re ordered all labs as stat and notified lab   
Called lab for STAT blood cultures.  
Chart accessed for pending admission   
Chart accessed for pending discharge to 450  Electronically signed by Evelyne Zapata RN on 2/18/2025 at 6:46 PM    
Comprehensive Nutrition Assessment    Type and Reason for Visit:  Reassess    Nutrition Recommendations/Plan:   Continue current diet per SLP recs  Start magic cup BID to promote oral intake; nectar thick liquids noted  Will monitor     Malnutrition Assessment:  Malnutrition Status:  At risk for malnutrition (03/10/25 1204)    Context:  Acute Illness     Findings of the 6 clinical characteristics of malnutrition:  Energy Intake:  75% or less of estimated energy requirements for 7 or more days  Weight Loss:  Unable to assess (fluid shifts w/ CHF/cirrhosis)     Body Fat Loss:  No body fat loss     Muscle Mass Loss:  No muscle mass loss    Fluid Accumulation:  No fluid accumulation     Strength:  Not Performed    Nutrition Assessment:    Pt admit d/t SOB & LE edema, s/p fall. Found w/ acute hepatic encephalopathy 2/2 ETOH withdrawal. PMHx ETOH abuse, ETOH cirrhosis, CAD, hx CABG, CVA, CKD, COPD, CHF, hx testicular CA s/p chemo, VHD s/p AVR & MVR. Noted RRT/transfer to MICU 2/2 AMS/ETOH withdrawal w/ DTs.  Noted concern for GIB d/t severe anemia, s/p EGD w/ bx revealing mod portal hypertensive gastropathy 3/1 ; pt w/ hx of TF via NGT this adm, now s/p MBSS 3/7 where SLP recommending soft/bite size solids and necart thick liquids; pleural effusion noted; pt on oral diet, will start ONS to promote oral intake and will monitor.    Nutrition Related Findings:    responds to voice; oriented to person; active BS; +1/trace edema; hypokalemia; hypophosphatemia; elevated AST/bili; -I/O Wound Type: None       Current Nutrition Intake & Therapies:    Average Meal Intake: 51-75%  Average Supplements Intake: None Ordered  ADULT DIET; Dysphagia - Soft and Bite Sized; Low Sodium (2 gm); Mildly Thick (Nectar)  ADULT ORAL NUTRITION SUPPLEMENT; Lunch, Dinner; Frozen Oral Supplement    Anthropometric Measures:  Height: 177.8 cm (5' 10\")  Ideal Body Weight (IBW): 166 lbs (75 kg)    Admission Body Weight: 108 kg (238 lb) (2/21 bed 
Consulted for piv and labs. Spoke to jori kuhn. He said pt had a working iv and labs were obtained.   
Cooling blanket was applied to patient.   
Coordination of care discussion and chart review with PM team. Pt seen in icu, he is awake, no family present at this time.No immediate PM psychosocial needs identified for Len Dorsey.   
DAILY VENTILATOR WEANING ASSESSMENT PERFORMED    P/FIO2 Ratio =   264       (<100= do not Wean)                  Cs =       27                   (<32= Instability)  Plat. Pressure = 27  MV =7.14      Instabilities:       Cardiovascular =1       CNS =       Respiratory =       Metabolic =    Parameters    no    Ask Physician for a weaning plan no    Was SBT completed no    Additional Comments:     Performed by Traci Paulino RCP RRT      Reference Table:    Cardiovascular     CNS      1. Mean BP less than or equal to 75   1. Neuromuscular blockade  2. Heart Rate greater than 130   2. RASS of -3, -4, -5  3. Myocardial Ischemia    3. RASS of +3, +4  4. Mechanical Assist Device    4. ICP greater than 15 or             Intracranial Hypertension         Respiratory      Metabolic  1. PEEP equal to or greater than 10cm/H20  1.Temp. (8hrs) less than 95 or > 103  2. Respiratory Rate greater than 35   2. WBC < 5000 or > 26301  3. Minute Volume greater than 15L  4. pH less than 7.30  5. Deteriorating chest X-ray        
DAILY VENTILATOR WEANING ASSESSMENT PERFORMED    P/FIO2 Ratio =  235       (<100= do not Wean)                  Cs =   30                       (<32= Instability)  Plat. Pressure = 18  MV =11.7  RSBI =    Instabilities:       Cardiovascular =       CNS =       Respiratory =       Metabolic =    Parameters    no    Ask Physician for a weaning plan no    Was SBT completed no    Additional Comments:     Performed by Traci Paulino RCP RRT      Reference Table:    Cardiovascular     CNS      1. Mean BP less than or equal to 75   1. Neuromuscular blockade  2. Heart Rate greater than 130   2. RASS of -3, -4, -5  3. Myocardial Ischemia    3. RASS of +3, +4  4. Mechanical Assist Device    4. ICP greater than 15 or             Intracranial Hypertension         Respiratory      Metabolic  1. PEEP equal to or greater than 10cm/H20  1.Temp. (8hrs) less than 95 or > 103  2. Respiratory Rate greater than 35   2. WBC < 5000 or > 94289  3. Minute Volume greater than 15L  4. pH less than 7.30  5. Deteriorating chest X-ray        
DAILY VENTILATOR WEANING ASSESSMENT PERFORMED    P/FIO2 Ratio = 2.58         (<100= do not Wean)                  Cs = 28                     (<32= Instability)  Plat. Pressure = 24  MV = 9.48  RSBI =    Instabilities:       Cardiovascular =       CNS =       Respiratory = Cs 28       Metabolic =    Parameters    no  SBT was not complete.     Ask Physician for a weaning plan yes    Was SAT completed yes    Was SBT completed no    Additional Comments:   Attempted to place pt on SBT per verbal order. Pt began breathing >40 breaths per minute, <250 tidal volume. Pt appears restless and agitated. Pt placed back on ACVC +. Notified resident, resident stated for RN to start precedex and to reattempt. Notified RN.     Reference Table:    Cardiovascular     CNS      1. Mean BP less than or equal to 75   1. Neuromuscular blockade  2. Heart Rate greater than 130   2. RASS of -3, -4, -5  3. Myocardial Ischemia    3. RASS of +3, +4  4. Mechanical Assist Device    4. ICP greater than 15 or             Intracranial Hypertension         Respiratory      Metabolic  1. PEEP equal to or greater than 10cm/H20  1.Temp. (8hrs) less than 95 or > 103  2. Respiratory Rate greater than 35   2. WBC < 5000 or > 92819  3. Minute Volume greater than 15L  4. pH less than 7.30  5. Deteriorating chest X-ray        
DAILY VENTILATOR WEANING ASSESSMENT PERFORMED    P/FIO2 Ratio = 235         (<100= do not Wean)                  Cs =             22             (<32= Instability)  Plat. Pressure = 28  MV =9.18    Instabilities:       Cardiovascular =1 (68)       CNS =       Respiratory =       Metabolic =    Parameters    no    Ask Physician for a weaning plan no          Additional Comments:     Performed by Traci Paulino RCP RRT      Reference Table:    Cardiovascular     CNS      1. Mean BP less than or equal to 75   1. Neuromuscular blockade  2. Heart Rate greater than 130   2. RASS of -3, -4, -5  3. Myocardial Ischemia    3. RASS of +3, +4  4. Mechanical Assist Device    4. ICP greater than 15 or             Intracranial Hypertension         Respiratory      Metabolic  1. PEEP equal to or greater than 10cm/H20  1.Temp. (8hrs) less than 95 or > 103  2. Respiratory Rate greater than 35   2. WBC < 5000 or > 75044  3. Minute Volume greater than 15L  4. pH less than 7.30  5. Deteriorating chest X-ray        
Date : 2025  Time:  10:50   Patient Name: Len Dorsey   Patient : [id number]  Procedure: Esophagogastroduodenoscopy with possible biopsy, control of hemorrhage or banding   Verify Correct Patient by two identifiers:  Yes  Verbal agreement by all team members on the procedure to be done?  Yes  Correct position?   Yes  Correct site?   Yes  Correct site and side of surgery as evidenced by markings made?    N/A  Correct implants, imaging data and/or special equipment available?    Yes  
Date: 2/17/2025    Time: 4:17 PM    Patient Placed On BIPAP/CPAP/ Non-Invasive Ventilation?  Yes    If no must comment.  Facial area red/color change? No           If YES are Blister/Lesion present?No   If yes must notify nursing staff  BIPAP/CPAP skin barrier?  Yes    Skin barrier type:mepilexlite       Comments:        Patricia Simerlink, RCP  
Date: 2/22/2025    Time: 12:34 AM    Patient Placed On BIPAP/CPAP/ Non-Invasive Ventilation?  Yes    If no must comment.  Facial area red/color change? No           If YES are Blister/Lesion present?No   If yes must notify nursing staff  BIPAP/CPAP skin barrier?  Yes    Skin barrier type:mepilexlite       Comments:        Glenn Murdock RCP  
Date: 2/23/2025    Time: 10:39 PM    Patient Placed On BIPAP/CPAP/ Non-Invasive Ventilation?  Yes    If no must comment.  Facial area red/color change? Yes           If YES are Blister/Lesion present?No   If yes must notify nursing staff  BIPAP/CPAP skin barrier?  Yes    Skin barrier type:mepilex       Comments:   02/23/25 8579   NIV Type   NIV Started/Stopped On   Equipment Type V60   Mode AVAPS   Mask Type Full face mask   Assessment   Pulse 91   Respirations 27   SpO2 99 %   Level of Consciousness 0   Comfort Level Fair   Using Accessory Muscles No   Mask Compliance Fair   Skin Assessment Redness (see comment/note)  (right side of nose with redness, no breakkdown)   Skin Protection for O2 Device Yes   Orientation Middle   Location Nose   Intervention(s) Skin Barrier   Settings/Measurements   PIP Observed 18 cm H20   CPAP/EPAP 6 cmH2O   IPAP Min 16 cmH2O   IPAP Max 22 cmH2O   Vt (Set, mL) 500 mL   Vt (Measured) 516 mL   Rate Ordered 16   Insp Rise Time (%) 3 %   FiO2  35 %   I Time/ I Time % 0.8 s   Minute Volume (L/min) 14 Liters   Mask Leak (lpm) 81 lpm             CHRISTIAN GO RCP  
Date: 2/23/2025    Time: 12:27 AM    Patient Placed On BIPAP/CPAP/ Non-Invasive Ventilation?  No, remains on from previous shift.     If no must comment.  Facial area red/color change? No           If YES are Blister/Lesion present?No   If yes must notify nursing staff  BIPAP/CPAP skin barrier?  Yes    Skin barrier type:mepilexlite       Comments:        Glenn Murdock RCP  
Date: 2/24/2025    Time: 12:14 AM    Patient Placed On BIPAP/CPAP/ Non-Invasive Ventilation?  No, remains on.     If no must comment.  Facial area red/color change? Yes, slight redness on side of nose           If YES are Blister/Lesion present?No   If yes must notify nursing staff  BIPAP/CPAP skin barrier?  Yes    Skin barrier type:mepilexlite       Comments:        Glenn Murdock RCP  
Department of Internal Medicine  Nephrology Attending progress Note    Events reviewed.    SUBJECTIVE: We are following Len Dorsey for hypernatremia and FRANTZ. Patient reports no complaints, confused.       PHYSICAL EXAM:      Vitals:    VITALS:  BP (!) 114/53 Comment: will notify nurse  Pulse (!) 101   Temp 97.2 °F (36.2 °C) (Temporal)   Resp 17   Ht 1.778 m (5' 10\")   Wt 99.8 kg (220 lb)   SpO2 96%   BMI 31.57 kg/m²   24HR INTAKE/OUTPUT:    Intake/Output Summary (Last 24 hours) at 3/15/2025 1137  Last data filed at 3/15/2025 0830  Gross per 24 hour   Intake --   Output 150 ml   Net -150 ml     Scheduled Meds:   warfarin  5 mg Oral Once    hydrOXYzine  50 mg IntraMUSCular Once    bumetanide  2 mg Oral Daily    thiamine  100 mg Oral Daily    sodium chloride flush  5-40 mL IntraVENous 2 times per day    lidocaine 1 % injection  50 mg IntraDERmal Once    folic acid  1 mg IntraVENous Daily    multivitamin  1 tablet Oral Daily    pantoprazole (PROTONIX) 40 mg in sodium chloride (PF) 0.9 % 10 mL injection  40 mg IntraVENous Daily    lactulose  20 g Per NG tube TID    magnesium oxide  400 mg Per NG tube Daily    rifAXIMin  400 mg Per NG tube TID    warfarin placeholder: dosing by pharmacy   Oral RX Placeholder    [Held by provider] metoprolol succinate  25 mg Oral Daily    arformoterol tartrate  15 mcg Nebulization BID RT    budesonide  0.5 mg Nebulization BID RT    sodium chloride flush  5-40 mL IntraVENous 2 times per day     Continuous Infusions:   sodium chloride      heparin (PORCINE) Infusion 11 Units/kg/hr (03/15/25 0916)    sodium chloride 50 mL/hr at 03/07/25 0347     PRN Meds:.LORazepam, ipratropium 0.5 mg-albuterol 2.5 mg, sodium chloride flush, sodium chloride, acetaminophen **OR** acetaminophen, polyethylene glycol, artificial tears, heparin (porcine), heparin (porcine), sodium chloride flush, sodium chloride     Constitutional:  Awake, alert, confused, in NAD  HEENT:  PERRLA, normocephalic, 
Discussed heparin order with resident on call, heparin ordered but gtt off since 2/28, notified resident that patient bas been started on warfarin but he has not yet had therapeutic INR. No new orders, ok to leave heparin gtt off.    
Discussed with Dr Yusuf replacing potassium per prn orders he verbally said to d/c hs orders and replaced per PRN's  
Dr. Liu notified of nephrology consult. Pt added to census.  
Dr. Yusuf notified of positive blood cultures via [employer].     Electronically signed by Lionel Rowe RN on 2/20/2025 at 2:22 PM    
Faxed Face Sheet, Labs, MAR and clinicals to [facility] @ [phone]  
I discussed case with the nurse, patient persistent CIWA of 20, I called intensivist Dr Aaron Rossi to discuss the case via Perfect serve for transfer of the patient to MICU. The intensivist requested my immediate presence, but I was attending to another patient and was unable to respond at that moment.  As soon as I was done with it, I went to see the intensivist IN PERSON in MICU to follow up.  
I was called to bedside to evaluate Mr. Dorsey who is a concern for deterioration and alcohol withdrawal and delirium tremens.    I was asked to come to bedside by hospitalist who said they were there, Dr. Chris Yusuf.  Dr. Yusuf was not only not there but was not reachable by phone call or Perfect Serve when called directly twice.  The hospitalist eventually contacted me about 25 minutes later to discuss case.      I am concerned about the QTc interval which has been over 600 ms since 2/18/2025 and again at bedside when taken which was still above 600 ms.    Patient OK to be transferred to MICU for closer status.  I took it upon myself to notify Dr. Wells about the change in clinical status of the patient and that he would go to ICU level of care.  Heparin drip to be considered.    Patient will be transferred to ICU as soon as bed is available.    Aaron Rossi MD  2/21/2025  3:22 PM           
IV Team consulted to attempt PIV access. Electronically signed by Silvia Tee RN on 2/21/2025 at 8:02 AM    
K+ replaced using PRN order.   Electronically signed by Silvia Tee RN on 2/21/2025     
Labs drawn left upper extremity using 22 gauge Needle. Pt. Tolerated well.  
[facility]  Department of Internal Medicine   Internal Medicine Residency   MICU Progress Note    Patient:  Len Dorsey 59 y.o. male  MRN: [id number]     Date of Service: 2/27/2025    Allergy: Patient has no known allergies.    Hospital Day: 11  ICU Length of Stay: 5d 11h    Reason for admission: Hypoxia    Subjective     Overnight events: Agitation - 2mg versed given, fentanyl PRN    He was seen and examined at bedside today. He is intubated and sedated. ROS unable to perform as he is on vent.     Objective     VS: /71   Pulse 82   Temp 98.8 °F (37.1 °C) (Axillary)   Resp 17   Ht 1.778 m (5' 10\")   Wt 119.3 kg (262 lb 14.4 oz)   SpO2 100%   BMI 37.72 kg/m²         I & O - 24hr:   Intake/Output Summary (Last 24 hours) at 2/27/2025 0748  Last data filed at 2/27/2025 0658  Gross per 24 hour   Intake 4197.3 ml   Output 2850 ml   Net 1347.3 ml     Net IO Since Admission: 4,432.37 mL [02/27/25 0748]    Physical Exam:  General Appearance: intubated, ill appearing  HEENT: Normocephalic, atraumatic  Neck: midline trachea  Lung: clear to auscultation bilaterally  Heart: regular rate and rhythm, (+) murmur  Abdomen: soft, bowel sounds normal; no masses  Extremities: no cyanosis or edema  Musculokeletal: No joint swelling  Neurologic: Mental status: intubated, sedated, (+) corneal reflex, (+) cough reflex, weak gag reflex    Lines, Drains, Airway (LDA)     Lines     site date day    Art line   None     TLC R IJ 02/26/2025 1   PICC None     Hemoaccess None       Drains:  [x] Urinary Catheter   [x] Fecal Management System    [x] NG Tube     Airway:    Mechanical ventilation   Day: 02/25/2025 (2)    ET Tube: 8mm  Mode: A/C  RR:16    TV: 450 mL FiO2 40 PEEP 8 cmH2O  Ppeak: 32        Pplat: 30 Cs: 17      Medications     Continuous Infusions:   midazolam 4 mg/hr (02/27/25 0658)    norepinephrine 2 mcg/min (02/27/25 0658)    sodium chloride      sodium chloride      heparin (PORCINE) Infusion 11.019 
Message sent to Dr. Yusuf about SLP recommendations  
Message sent via perfect serve to Julianne Morales regarding morning labs not being ordered. CBC and BMP ordered for one time.  
Message sent via perfect serve to Julianne Morales regarding patient being lethargic and unable to stay awake. ABG's ordered and drawn. CIWA discontinued.  
Messaged Natasha Morales  to let her know pt is now coughing after drinking water and sounds very wet. This RN placed him on an NPO diet and consulted speech. Natasha Morales  ordered a BNP and CXR.     Plan of care ongoing   Electronically signed by Evelyne Zapata RN on 2/19/2025 at 6:48 AM    
Messaged Natasha Morales regarding pt's potassium of 2.6. Per protocol, 60mEq (6 bags) of IV potassium initiated. Pt could not tolerate the 100ml/hr potassium so this RN changed the rate of 75 and added NS at a rate of 75ml/hr. Pt could now tolerate. Messaged Natasha Morales  to let her know that with that rate, IV potassium would take longer. Asked if this RN could run the 3 bags of potassium (instead of all 6) but add PO potassium as well. Natasha Morales  replied with just to do all po potassium and stop the fluids. This RN asked how many mEq of PO potassium to administer. Message unread. All IV fluids stopped.     Electronically signed by Evelyne Zapata RN on 2/19/2025 at 1:30 AM    UPDATE: 2 separate orders of 40mEq PO potassium ordered by Natasha Morales. To make a total of 80mEq of potassium. This RN administered the 80mEq PO potassium prescribed and charted in the MAR accordingly.     Electronically signed by Evelyne Zapata RN on 2/19/2025 at 1:54 AM    
Messaged Natasha Morales to see if IV lasix post transfusion could be ordered due to patient sounding wet. Messaged read and replied w/ no since creatinine is not great.     Electronically signed by Evelyne Zapata RN on 2/19/2025 at 1:55 AM    
Midline not drawing. Peripheral blood draw done. CBC, Mg, CMP, Phosp, INR and sent to lab.  
N2N called to 5we  
Nurse to nurse called to MICU MELIDA Jacobs.   Room is not yet ready.   Electronically signed by Silvia Tee RN on 2/21/2025 at 6:51 PM    
Occupational Therapy  Attempted OT eval 2x this date, pt unavailable - receiving care, testing - SLP, ECHO - at b/s.  Will attempt assessment at a later time.  Thank you for this referral. MARIA D Garcia, OTR/L  # [id number]    
Occupational Therapy  Attempted OT eval at b/s, however, pt declined to participate stating \"I've got too much on my mind - I want to talk to my fiance.\"  Will attempt assessment at a later time.  Thank you for this referral.  Richa Garcia, [facility], OTR/L  # [id number]    
Occupational Therapy  Attempted OT eval this date, however, pt acting out overnight, attempting to climb OOB to \"go to the store\" this AM.  Will Hold Assessment this date as pt is not currently cognitively appropriate to participate in eval.  Thank you for this referral.  Richa Garcia, MOT, OTR/L  # [id number]    
Palliative Medicine Social Work     Patient Name: Len Dorsey    Age: 59 y.o.    Marital Status:      Status: no    Next of Kin:Primary Decision Maker: Leonie Moreland R - Domestic Partner - [phone]     Additional Support: no    Children: pt has an adopted daughter Tanvi with his ex wife.During previous admissions Pt and Leonie states he has not seen her since she was 12 years old.     Advanced Directives: [facility] in epic appoints Leonie Moreland as only agent.    Confirm Code Status: full    Current Goals of Care:     Mental Health History: depression    Substance Abuse:history of etoh abuse    Indications of Abuse/Neglect: no    Financial Concerns: pt is on long term disability     Living Situation: Pt resides with Leonie.    Physical Care Needs Met: yes    Emotional Needs Met: pt lethargic and unable to participate in visit.    Assessment: Pt known to Palliative care from admission in 2024. 60 yo male admitted from home  with  a reported fall and shortness of breath. He has a history of alcoholic liver disease, aortic valve replacement, mitral valve replacement, HFrEF 40-45%, stroke, testicular ca s/p chemotherapy, CAD s/p CABG.Pt lethargic and unable to participate in visit. Reviewed with Np who is contacting [facility].    
Patient admitted to MICU with the following belongings:  Glasses, Cell Phone, Cell Phone , Pants, Shoes, Jacket, and Other undergarments . The following belongings admitted with the patient, ALL, are at bedside   
Patient attempted to get out of bed, stated he was getting a urinal. Patient has an external catheter on, educated him on the use of it and the importance of staying in bed as he is on a CIWA and has seizure precautions.  
Patient began to kick legs at nurse when trying to be repositioned. Resident notified and order for ankle restraints initiated.   
Patient confused and pulling BiPAP off, educated him on the importance of calling for help before the BiPAP comes off because he needs his nasal cannula on. Patient yelling at RN. Nasal cannula placed back on patient.  
Patient continuously pulling external catheter, oxygen, and continuous pulse ox off despite redirection and education. Patient also pulled IV out. Telesitter is at bedside and has not went off. This RN explained to him that he will need a new IV as he is to go for a procedure today. Patient's oxygen saturation currently 90%. Will attempt to get new access.   
Patient has tube feeding like drainage from mouth.tube feeding stopped and repeat X-Ray ordered for morning.  
Patient is known to Dr. Strong. Please notify their service of consult. Thank you!   
Patient placed back on AC settings at this time.   
Patient placed on PSV at this time.   
Patient placed on PSV at this time.    03/05/25 1220   Patient Observation   Pulse 92   SpO2 95 %   Vent Information   Vent Mode (S)  CPAP/PS   Ventilator Settings   FiO2  35 %   PEEP/CPAP (cmH2O) 8   Pressure Support (cm H2O) (S)  10 cm H2O   Vent Patient Data (Readings)   Vt (Measured) 300 mL   Peak Inspiratory Pressure (cmH2O) 19 cmH2O   Rate Measured 29 br/min   Minute Volume (L/min) 7.89 Liters   Mean Airway Pressure (cmH2O) 12 cmH20   Plateau Pressure (cm H2O) 23 cm H2O   Driving Pressure 15   I:E Ratio 1:2.80   Flow Sensitivity 3 L/min   Backup Apnea On   Backup Rate 12 Breaths Per Minute   Backup Vt 450   Vent Alarm Settings   High Pressure (cmH2O) 45 cmH2O   Low Minute Volume (lpm) 5 L/min   High Minute Volume (lpm) 18 L/min   Low Exhaled Vt (ml) 300 mL   RR High (bpm) 35 br/min   Apnea (secs) 20 secs   Additional Respiratoray Assessments   Humidification Source Heated wire   Humidification Temp 37   Ambu Bag With Mask At Bedside Yes   ETT    Placement Date/Time: 02/25/25 1250   Placement Verified By: Auscultation;Capnometry;Chest X-ray;Direct visualization  Preoxygenation: Yes  Airway Type: Cuffed  Airway Tube Size: 8 mm  Location: Oral  Secured At: 24 cm   Secured At 24 cm   Measured From Lips       
Patient was extubated to 6 liters/min via  high flow nasal cannula . Stridor was not present post extubation. SPO2 was 95%. Patient was suctioned pre and post extubation. Cuff deflated prior to extubation.       Performed by  Traci Paulino RCP  
Patient was getting worked up during attempt to clean him.  
Pharmacy Consultation Note  (Warfarin Dosing and Monitoring)    Initial consult date: 2/17/2025  Consulting physician: Len Arguello DO.     Allergies:  Patient has no known allergies.    Ht Readings from Last 1 Encounters:   02/18/25 1.778 m (5' 10\")     Wt Readings from Last 1 Encounters:   02/18/25 106.6 kg (235 lb 0.2 oz)         Warfarin Indication Target   INR Range Home Dose  (if applicable)   Diet/Feeding Tube   Mechanical AVR/MVR 2.5-3.5 1mg PO Qday (patient non-adherent >1 month) Diet NPO          TSH:    Lab Results   Component Value Date/Time    TSH 1.69 02/18/2025 12:29 PM        Hepatic Function Panel:                            Lab Results   Component Value Date/Time    ALKPHOS 189 02/18/2025 06:00 AM    ALT 20 02/18/2025 06:00 AM    AST 39 02/18/2025 06:00 AM    BILITOT 2.2 02/18/2025 06:00 AM    BILIDIR 0.3 08/11/2024 03:10 AM    IBILI 0.2 08/11/2024 03:10 AM       Date Warfarin Dose INR Heparin or LMWH HBG/HCT PLT Comment   2/17/25 HOLD 4.6 -- 7.0/22.3 100    2/18/25 HOLD 4.6 -- 6.0/19.1 58    2/19 HOLD 3.4 --- 7.3/23.8 73 (S/p 2 units PRBCs)                       Assessment:  Len Dorsey is a 59 y.o. male with a history of mechanical valve replacement anticoagulated with warfarin  Home dose: 1mg PO Qday. Per patient, non-adherent with Warfarin for >1 month.   INR 3.4 today    Plan:  Spoke to Dr. Yusuf, hold warfarin for today, plan to restart tomorrow  Daily PT/INR until the INR is stable within the therapeutic range  Pharmacist will follow and monitor/adjust dosing as necessary      Arpan Lozano PharmD, BCCCP 2/19/2025 12:44 PM  [phone]    
Pharmacy Consultation Note  (Warfarin Dosing and Monitoring)    Initial consult date: 2/17/2025  Consulting physician: Len Arguello DO.     Allergies:  Patient has no known allergies.    Ht Readings from Last 1 Encounters:   02/18/25 1.778 m (5' 10\")     Wt Readings from Last 1 Encounters:   02/18/25 106.6 kg (235 lb 0.2 oz)         Warfarin Indication Target   INR Range Home Dose  (if applicable)   Diet/Feeding Tube   Mechanical AVR/MVR 2.5-3.5 1mg PO Qday (patient non-adherent >1 month) Diet NPO  ADULT DIET; Dysphagia - Soft and Bite Sized; Low Sodium (2 gm)          TSH:    Lab Results   Component Value Date/Time    TSH 1.69 02/18/2025 12:29 PM        Hepatic Function Panel:                            Lab Results   Component Value Date/Time    ALKPHOS 193 02/20/2025 10:52 AM    ALT 20 02/20/2025 10:52 AM    AST 34 02/20/2025 10:52 AM    BILITOT 3.2 02/20/2025 10:52 AM    BILIDIR 1.8 02/20/2025 10:52 AM    IBILI 1.4 02/20/2025 10:52 AM       Date Warfarin Dose INR Heparin or LMWH HBG/HCT PLT Comment   2/17/25 HOLD 4.6 -- 7.0/22.3 100    2/18/25 HOLD 4.6 -- 6.0/19.1 58    2/19 HOLD 3.4 --- 7.3/23.8 73 (S/p 2 units PRBCs)   2/20 HOLD 2.8 --- 7.4/24 ---               Assessment:  Len Dorsey is a 59 y.o. male with a history of mechanical valve replacement anticoagulated with warfarin  Home dose: 1mg PO Qday. Per patient, non-adherent with Warfarin for >1 month.   INR 2.8 today    Plan:  Per Dr. Yusuf, start warfarin tomorrow after EGD in the AM  Daily PT/INR until the INR is stable within the therapeutic range  Pharmacist will follow and monitor/adjust dosing as necessary    Gonzalo Olmos, PharmD  PGY-1 Pharmacy Practice Resident   2/20/2025 1:14 PM      
Pharmacy Consultation Note  (Warfarin Dosing and Monitoring)    Initial consult date: 2/17/2025  Consulting physician: Len Arguello DO.     Allergies:  Patient has no known allergies.    Ht Readings from Last 1 Encounters:   02/18/25 1.778 m (5' 10\")     Wt Readings from Last 1 Encounters:   02/21/25 108 kg (238 lb 3.2 oz)         Warfarin Indication Target   INR Range Home Dose  (if applicable)   Diet/Feeding Tube   Mechanical AVR/MVR 2.5-3.5 1mg PO Qday (patient non-adherent >1 month) Diet NPO Exceptions are: Sips of Water with Meds          TSH:    Lab Results   Component Value Date/Time    TSH 1.69 02/18/2025 12:29 PM        Hepatic Function Panel:                            Lab Results   Component Value Date/Time    ALKPHOS 208 02/21/2025 04:55 AM    ALT 22 02/21/2025 04:55 AM    AST 44 02/21/2025 04:55 AM    BILITOT 3.4 02/21/2025 04:55 AM    BILIDIR 2.3 02/21/2025 04:55 AM    IBILI 1.1 02/21/2025 04:55 AM       Date Warfarin Dose INR Heparin or LMWH HBG/HCT PLT Comment   2/17/25 HOLD 4.6 -- 7.0/22.3 100    2/18/25 HOLD 4.6 -- 6.0/19.1 58    2/19 HOLD 3.4 --- 7.3/23.8 73 (S/p 2 units PRBCs)   2/20 HOLD 2.8 --- 7.4/24 ---    2/21 HOLD 2.1 --- 7.6/25.1 80      Assessment:  Len Dorsey is a 59 y.o. male with a history of mechanical valve replacement anticoagulated with warfarin  Home dose: 1mg PO Qday. Per patient, non-adherent with Warfarin for >1 month.   INR 2.1 today. Pt becoming agitated and EGD was not completed today.    Plan:  No warfarin tonight  Pt being transferred to ICU with possible heparin drip initiation per ICU team.   Daily PT/INR until the INR is stable within the therapeutic range  Pharmacist will follow and monitor/adjust dosing as necessary    Gonzalo Olmos, PharmD  PGY-1 Pharmacy Practice Resident   2/21/2025 4:02 PM      
Pharmacy Consultation Note  (Warfarin Dosing and Monitoring)    Initial consult date: 2/17/2025  Consulting physician: Len Arguello DO.     Allergies:  Patient has no known allergies.    Ht Readings from Last 1 Encounters:   02/18/25 1.778 m (5' 10\")     Wt Readings from Last 1 Encounters:   02/22/25 117.7 kg (259 lb 6.4 oz)         Warfarin Indication Target   INR Range Home Dose  (if applicable)   Diet/Feeding Tube   Mechanical AVR/MVR 2.5-3.5 1mg PO Qday (patient non-adherent >1 month) Diet NPO  ADULT TUBE FEEDING; Nasogastric; Standard with Fiber; Continuous; 10; Yes; 10; Q 4 hours; 45; 30; Q 4 hours          TSH:    Lab Results   Component Value Date/Time    TSH 1.69 02/18/2025 12:29 PM        Hepatic Function Panel:                            Lab Results   Component Value Date/Time    ALKPHOS 206 02/22/2025 05:24 AM    ALT 27 02/22/2025 05:24 AM    AST 57 02/22/2025 05:24 AM    BILITOT 3.4 02/22/2025 05:24 AM    BILIDIR 2.3 02/21/2025 04:55 AM    IBILI 1.1 02/21/2025 04:55 AM       Date Warfarin Dose INR Heparin or LMWH HBG/HCT PLT Comment   2/17/25 HOLD 4.6 -- 7.0/22.3 100    2/18/25 HOLD 4.6 -- 6.0/19.1 58    2/19 HOLD 3.4 --- 7.3/23.8 73 (S/p 2 units PRBCs)   2/20 HOLD 2.8 --- 7.4/24 ---    2/21 HOLD 2.1 --- 7.6/25.1 80    2/22 HOLD 1.8 Heparin gtt 7.5/25.1 102      Assessment:  Len Dorsey is a 59 y.o. male with a history of mechanical valve replacement anticoagulated with warfarin  Home dose: 1mg PO Qday. Per patient, non-adherent with Warfarin for >1 month.   INR 1.8 today    Plan:  No warfarin tonight. Hold warfarin until ok with ICU team to resume.  Daily PT/INR until the INR is stable within the therapeutic range  Pharmacist will follow and monitor/adjust dosing as necessary    Gonzalo Olmos, PharmD  PGY-1 Pharmacy Practice Resident   2/22/2025 11:48 AM      
Pharmacy Consultation Note  (Warfarin Dosing and Monitoring)    Initial consult date: 2/17/2025  Consulting physician: Len Arguello DO.     Allergies:  Patient has no known allergies.    Ht Readings from Last 1 Encounters:   02/18/25 1.778 m (5' 10\")     Wt Readings from Last 1 Encounters:   02/23/25 116.2 kg (256 lb 3.2 oz)         Warfarin Indication Target   INR Range Home Dose  (if applicable)   Diet/Feeding Tube   Mechanical AVR/MVR 2.5-3.5 1mg PO Qday (patient non-adherent >1 month) Diet NPO  ADULT TUBE FEEDING; Nasogastric; Standard with Fiber; Continuous; 10; Yes; 10; Q 4 hours; 45; 30; Q 4 hours          TSH:    Lab Results   Component Value Date/Time    TSH 1.69 02/18/2025 12:29 PM        Hepatic Function Panel:                            Lab Results   Component Value Date/Time    ALKPHOS 206 02/22/2025 05:24 AM    ALT 27 02/22/2025 05:24 AM    AST 57 02/22/2025 05:24 AM    BILITOT 3.4 02/22/2025 05:24 AM    BILIDIR 2.3 02/21/2025 04:55 AM    IBILI 1.1 02/21/2025 04:55 AM       Date Warfarin Dose INR Heparin or LMWH HBG/HCT PLT Comment   2/17/25 HOLD 4.6 -- 7.0/22.3 100    2/18/25 HOLD 4.6 -- 6.0/19.1 58    2/19 HOLD 3.4 --- 7.3/23.8 73 (S/p 2 units PRBCs)   2/20 HOLD 2.8 --- 7.4/24 ---    2/21 HOLD 2.1 --- 7.6/25.1 80    2/22 HOLD 1.8 Heparin gtt 7.5/25.1 102    2/23 HOLD 1.8 Heparin gtt 7.1/23.9 ---      Assessment:  Len Dorsey is a 59 y.o. male with a history of mechanical valve replacement anticoagulated with warfarin  Home dose: 1mg PO Qday. Per patient, non-adherent with Warfarin for >1 month.   INR 1.8 today    Plan:  No warfarin tonight. Hold warfarin until ok with ICU team to resume.  Daily PT/INR until the INR is stable within the therapeutic range  Pharmacist will follow and monitor/adjust dosing as necessary    Gonzalo Olmos, PharmD  PGY-1 Pharmacy Practice Resident   2/23/2025 7:16 AM      
Pharmacy Consultation Note  (Warfarin Dosing and Monitoring)    Initial consult date: 2/17/2025  Consulting physician: Len Arguello DO.     Allergies:  Patient has no known allergies.    Ht Readings from Last 1 Encounters:   02/24/25 1.778 m (5' 10\")     Wt Readings from Last 1 Encounters:   02/24/25 116.6 kg (257 lb)         Warfarin Indication Target   INR Range Home Dose  (if applicable)   Diet/Feeding Tube   Mechanical AVR/MVR 2.5-3.5 1mg PO Qday (patient non-adherent >1 month) Diet NPO  ADULT TUBE FEEDING; Nasogastric; Standard with Fiber; Continuous; 10; Yes; 10; Q 4 hours; 60; 300; Q 4 hours; Protein; 1 Dose; Daily          TSH:    Lab Results   Component Value Date/Time    TSH 1.69 02/18/2025 12:29 PM        Hepatic Function Panel:                            Lab Results   Component Value Date/Time    ALKPHOS 184 02/24/2025 03:59 AM    ALT 24 02/24/2025 03:59 AM    AST 42 02/24/2025 03:59 AM    BILITOT 2.3 02/24/2025 03:59 AM    BILIDIR 2.3 02/21/2025 04:55 AM    IBILI 1.1 02/21/2025 04:55 AM       Date Warfarin Dose INR Heparin or LMWH HBG/HCT PLT Comment   2/17/25 HOLD 4.6 -- 7.0/22.3 100    2/18/25 HOLD 4.6 -- 6.0/19.1 58    2/19 HOLD 3.4 --- 7.3/23.8 73 (S/p 2 units PRBCs)   2/20 HOLD 2.8 --- 7.4/24 ---    2/21 HOLD 2.1 --- 7.6/25.1 80    2/22 HOLD 1.8 Heparin gtt 7.5/25.1 102    2/23 HOLD 1.8 Heparin gtt 7.1/23.9 ---    2/24 HOLD 1.7 Heparin gtt 6.8/21.4 84      Assessment:  Len Dorsey is a 59 y.o. male with a history of mechanical valve replacement anticoagulated with warfarin  Home dose: 1mg PO Qday. Per patient, non-adherent with Warfarin for >1 month.   2/23: INR 1.8 today  2/24: INR 1.7    Plan:  No warfarin tonight. Hold warfarin until ok with ICU team to resume.  Daily PT/INR until the INR is stable within the therapeutic range  Pharmacist will follow and monitor/adjust dosing as necessary    Latrell Vidal PharmD, BCPS, BCCCP 2/24/2025 2:29 PM  Phone: [phone]      
Pharmacy Consultation Note  (Warfarin Dosing and Monitoring)    Initial consult date: 2/17/2025  Consulting physician: Len Arguello DO.     Allergies:  Patient has no known allergies.    Ht Readings from Last 1 Encounters:   02/24/25 1.778 m (5' 10\")     Wt Readings from Last 1 Encounters:   02/25/25 116.9 kg (257 lb 12.8 oz)         Warfarin Indication Target   INR Range Home Dose  (if applicable)   Diet/Feeding Tube   Mechanical AVR/MVR 2.5-3.5 1mg PO Qday (patient non-adherent >1 month) Diet NPO  ADULT TUBE FEEDING; Nasogastric; Standard with Fiber; Continuous; 10; Yes; 10; Q 4 hours; 60; 500; Q 4 hours; Protein; 1 Dose; Daily          TSH:    Lab Results   Component Value Date/Time    TSH 1.69 02/18/2025 12:29 PM        Hepatic Function Panel:                            Lab Results   Component Value Date/Time    ALKPHOS 191 02/25/2025 04:02 AM    ALT 23 02/25/2025 04:02 AM    AST 41 02/25/2025 04:02 AM    BILITOT 2.2 02/25/2025 04:02 AM    BILIDIR 2.3 02/21/2025 04:55 AM    IBILI 1.1 02/21/2025 04:55 AM       Date Warfarin Dose INR Heparin or LMWH HBG/HCT PLT Comment   2/17/25 HOLD 4.6 -- 7.0/22.3 100    2/18/25 HOLD 4.6 -- 6.0/19.1 58    2/19 HOLD 3.4 --- 7.3/23.8 73 (S/p 2 units PRBCs)   2/20 HOLD 2.8 --- 7.4/24 ---    2/21 HOLD 2.1 --- 7.6/25.1 80    2/22 HOLD 1.8 Heparin gtt 7.5/25.1 102    2/23 HOLD 1.8 Heparin gtt 7.1/23.9 ---    2/24 HOLD 1.7 Heparin gtt 6.8/21.4 84    2/25 HOLD 1.5 Heparin gtt 7.5/25.8 91      Assessment:  Len Dorsey is a 59 y.o. male with a history of mechanical valve replacement anticoagulated with warfarin  Home dose: 1mg PO Qday. Per patient, non-adherent with Warfarin for >1 month.   2/23: INR 1.8 today  2/24: INR 1.7  2/25: INR 1.5    Plan:  No warfarin tonight. Hold warfarin until ok with ICU team to resume.  Daily PT/INR until the INR is stable within the therapeutic range  Pharmacist will follow and monitor/adjust dosing as necessary    Latrell Vidal PharmD, BCPS, 
Pharmacy Consultation Note  (Warfarin Dosing and Monitoring)    Initial consult date: 2/17/2025  Consulting physician: Len Arguello DO.     Allergies:  Patient has no known allergies.    Ht Readings from Last 1 Encounters:   02/24/25 1.778 m (5' 10\")     Wt Readings from Last 1 Encounters:   02/28/25 118.4 kg (261 lb)         Warfarin Indication Target   INR Range Home Dose  (if applicable)   Diet/Feeding Tube   Mechanical AVR/MVR 2.5-3.5 1mg PO Qday (patient non-adherent >1 month) Diet NPO Exceptions are: Sips of Water with Meds          TSH:    Lab Results   Component Value Date/Time    TSH 1.69 02/18/2025 12:29 PM        Hepatic Function Panel:                            Lab Results   Component Value Date/Time    ALKPHOS 171 02/28/2025 04:25 AM    ALT 13 02/28/2025 04:25 AM    AST 30 02/28/2025 04:25 AM    BILITOT 1.8 02/28/2025 04:25 AM    BILIDIR 2.3 02/21/2025 04:55 AM    IBILI 1.1 02/21/2025 04:55 AM       Date Warfarin Dose INR Heparin or LMWH HBG/HCT PLT Comment   2/17/25 HOLD 4.6 -- 7.0/22.3 100    2/18/25 HOLD 4.6 -- 6.0/19.1 58    2/19 HOLD 3.4 --- 7.3/23.8 73 (S/p 2 units PRBCs)   2/20 HOLD 2.8 --- 7.4/24 ---    2/21 HOLD 2.1 --- 7.6/25.1 80    2/22 HOLD 1.8 Heparin gtt 7.5/25.1 102    2/23 HOLD 1.8 Heparin gtt 7.1/23.9 ---    2/24 HOLD 1.7 Heparin gtt 6.8/21.4 84    2/25 HOLD 1.5 Heparin gtt 7.5/25.8 91    2/26 HOLD 1.4 Heparin gtt 7.5/25.4 92    - - - -   Warfarin stopped   2/28 1 mg 1.4 Heparin gtt   Re-consulted                       Assessment:  Len Roachtrungsylwias is a 59 y.o. male with a history of mechanical valve replacement anticoagulated with warfarin  PMH sx for hepatic cirrhosis  Home dose: 1mg PO Qday. Per patient, non-adherent with Warfarin for >1 month, noted from previous notes.     Plan:  Re-consulted to dose vancomycin  Warfarin 1 mg daily for now    Holly Meraz, AngélicaD, BCPS, BCCCP 2/28/2025 10:09 AM         
Pharmacy Consultation Note  (Warfarin Dosing and Monitoring)    Initial consult date: 2/17/2025  Consulting physician: Len Arguello DO.     Allergies:  Patient has no known allergies.    Ht Readings from Last 1 Encounters:   02/24/25 1.778 m (5' 10\")     Wt Readings from Last 1 Encounters:   03/01/25 117.4 kg (258 lb 12.8 oz)         Warfarin Indication Target   INR Range Home Dose  (if applicable)   Diet/Feeding Tube   Mechanical AVR/MVR 2.5-3.5 1mg PO Qday (patient non-adherent >1 month) Diet NPO Exceptions are: Sips of Water with Meds  ADULT TUBE FEEDING; Nasogastric; Standard with Fiber; Continuous; 10; Yes; 10; Q 4 hours; 60; 500; Q 3 hours; Protein; 1 Dose; Daily          TSH:    Lab Results   Component Value Date/Time    TSH 1.69 02/18/2025 12:29 PM        Hepatic Function Panel:                            Lab Results   Component Value Date/Time    ALKPHOS 174 03/01/2025 04:35 AM    ALT 14 03/01/2025 04:35 AM    AST 36 03/01/2025 04:35 AM    BILITOT 2.0 03/01/2025 04:35 AM    BILIDIR 2.3 02/21/2025 04:55 AM    IBILI 1.1 02/21/2025 04:55 AM       Date Warfarin Dose INR Heparin or LMWH HBG/HCT PLT Comment   2/17/25 HOLD 4.6 -- 7.0/22.3 100    2/18/25 HOLD 4.6 -- 6.0/19.1 58    2/19 HOLD 3.4 --- 7.3/23.8 73 (S/p 2 units PRBCs)   2/20 HOLD 2.8 --- 7.4/24 ---    2/21 HOLD 2.1 --- 7.6/25.1 80    2/22 HOLD 1.8 Heparin gtt 7.5/25.1 102    2/23 HOLD 1.8 Heparin gtt 7.1/23.9 ---    2/24 HOLD 1.7 Heparin gtt 6.8/21.4 84    2/25 HOLD 1.5 Heparin gtt 7.5/25.8 91    2/26 HOLD 1.4 Heparin gtt 7.5/25.4 92    - - - -   Warfarin stopped   2/28 1 mg 1.4 Heparin gtt   Re-consulted   3/1 < 1 mg > 1.3 Heparin gtt 6.8/23.1  1 unit PRBC ordered              Assessment:  Len Dorsey is a 59 y.o. male with a history of mechanical valve replacement anticoagulated with warfarin  PMH sx for hepatic cirrhosis  Home dose: 1mg PO Qday. Per patient, non-adherent with Warfarin for >1 month, noted from previous notes.   INR goal 2.5 
Pharmacy Consultation Note  (Warfarin Dosing and Monitoring)    Initial consult date: 2/17/2025  Consulting physician: Len Arguello DO.     Allergies:  Patient has no known allergies.    Ht Readings from Last 1 Encounters:   02/24/25 1.778 m (5' 10\")     Wt Readings from Last 1 Encounters:   03/02/25 118.6 kg (261 lb 6.4 oz)         Warfarin Indication Target   INR Range Home Dose  (if applicable)   Diet/Feeding Tube   Mechanical AVR/MVR 2.5-3.5 1mg PO Qday (patient non-adherent >1 month) Diet NPO Exceptions are: Sips of Water with Meds  ADULT TUBE FEEDING; Nasogastric; Standard with Fiber; Continuous; 10; Yes; 10; Q 4 hours; 60; 500; Q 3 hours; Protein; 1 Dose; Daily          TSH:    Lab Results   Component Value Date/Time    TSH 1.69 02/18/2025 12:29 PM        Hepatic Function Panel:                            Lab Results   Component Value Date/Time    ALKPHOS 190 03/02/2025 03:49 AM    ALT 15 03/02/2025 03:49 AM    AST 41 03/02/2025 03:49 AM    BILITOT 1.9 03/02/2025 03:49 AM    BILIDIR 2.3 02/21/2025 04:55 AM    IBILI 1.1 02/21/2025 04:55 AM       Date Warfarin Dose INR Heparin or LMWH HBG/HCT PLT Comment   2/17/25 HOLD 4.6 -- 7.0/22.3 100    2/18/25 HOLD 4.6 -- 6.0/19.1 58    2/19 HOLD 3.4 --- 7.3/23.8 73 (S/p 2 units PRBCs)   2/20 HOLD 2.8 --- 7.4/24 ---    2/21 HOLD 2.1 --- 7.6/25.1 80    2/22 HOLD 1.8 Heparin gtt 7.5/25.1 102    2/23 HOLD 1.8 Heparin gtt 7.1/23.9 ---    2/24 HOLD 1.7 Heparin gtt 6.8/21.4 84    2/25 HOLD 1.5 Heparin gtt 7.5/25.8 91    2/26 HOLD 1.4 Heparin gtt 7.5/25.4 92    - - - -   Warfarin stopped   2/28 1 mg 1.4 Heparin gtt   Re-consulted   3/1 No dose given (patient bleeding per nursing note) 1.3 Heparin gtt 6.8/23.1  1 unit PRBC ordered   3/2 < 1 mg > 1.3 Heparin gtt 7.4/25.1       Assessment:  Len Dorsey is a 59 y.o. male with a history of mechanical valve replacement anticoagulated with warfarin  [facility] sx for hepatic cirrhosis  Home dose: 1mg PO Qday. Per patient, 
Pharmacy Consultation Note  (Warfarin Dosing and Monitoring)    Initial consult date: 2/17/2025  Consulting physician: Len Arguello DO.     Allergies:  Patient has no known allergies.    Ht Readings from Last 1 Encounters:   08/09/24 1.778 m (5' 10\")     Wt Readings from Last 1 Encounters:   02/17/25 106.6 kg (235 lb)         Warfarin Indication Target   INR Range Home Dose  (if applicable)   Diet/Feeding Tube   Mechanical AVR/MVR 2.5-3.5 1mg PO Qday (patient non-adherent >1 month) ADULT DIET; Regular          TSH:    Lab Results   Component Value Date/Time    TSH 0.95 05/08/2024 02:24 AM        Hepatic Function Panel:                            Lab Results   Component Value Date/Time    ALKPHOS 211 02/17/2025 04:38 PM    ALT 26 02/17/2025 04:38 PM    AST 59 02/17/2025 04:38 PM    BILITOT 2.1 02/17/2025 04:38 PM    BILIDIR 0.3 08/11/2024 03:10 AM    IBILI 0.2 08/11/2024 03:10 AM       Date Warfarin Dose INR Heparin or LMWH HBG/HCT PLT Comment   2/17/25 HOLD 4.6 -- 7.0/22.3 100                                          Assessment:  Len Dorsey is a 59 y.o. male with a history of mechanical valve replacement anticoagulated with warfarin  Home dose: 1mg PO Qday. Per patient, non-adherent with Warfarin for >1 month.   INR 4.6 today.     Plan:  Hold Warfarin tonight.   Daily PT/INR until the INR is stable within the therapeutic range  Pharmacist will follow and monitor/adjust dosing as necessary      Tierney Mosher, PharmD, BCIDP, BCPS 2/17/2025 8:23 PM  [phone]  
Pharmacy Consultation Note  (Warfarin Dosing and Monitoring)    Initial consult date: 2/17/2025  Consulting physician: Len Arguello DO.     Allergies:  Patient has no known allergies.    Ht Readings from Last 1 Encounters:   08/09/24 1.778 m (5' 10\")     Wt Readings from Last 1 Encounters:   02/17/25 106.6 kg (235 lb)         Warfarin Indication Target   INR Range Home Dose  (if applicable)   Diet/Feeding Tube   Mechanical AVR/MVR 2.5-3.5 1mg PO Qday (patient non-adherent >1 month) ADULT DIET; Regular; Low Sodium (2 gm)          TSH:    Lab Results   Component Value Date/Time    TSH 1.69 02/18/2025 12:29 PM        Hepatic Function Panel:                            Lab Results   Component Value Date/Time    ALKPHOS 189 02/18/2025 06:00 AM    ALT 20 02/18/2025 06:00 AM    AST 39 02/18/2025 06:00 AM    BILITOT 2.2 02/18/2025 06:00 AM    BILIDIR 0.3 08/11/2024 03:10 AM    IBILI 0.2 08/11/2024 03:10 AM       Date Warfarin Dose INR Heparin or LMWH HBG/HCT PLT Comment   2/17/25 HOLD 4.6 -- 7.0/22.3 100    2/18/25 HOLD 4.6 -- 6.0/19.1 58                                 Assessment:  Len Dorsey is a 59 y.o. male with a history of mechanical valve replacement anticoagulated with warfarin  Home dose: 1mg PO Qday. Per patient, non-adherent with Warfarin for >1 month.   INR 4.6 again today (2/18).     Plan:  Hold Warfarin tonight.   Daily PT/INR until the INR is stable within the therapeutic range  Pharmacist will follow and monitor/adjust dosing as necessary      Tierney Mosher, PharmD, BCIDP, BCPS 2/18/2025 3:48 PM  [phone]  
Pharmacy Consultation Note  (Warfarin Dosing and Monitoring)    Initial consult date: 2/17/2025  Consulting physician: Santo Long MD    Allergies:  Patient has no known allergies.    Ht Readings from Last 1 Encounters:   02/24/25 1.778 m (5' 10\")     Wt Readings from Last 1 Encounters:   03/03/25 118.4 kg (261 lb)         Warfarin Indication Target   INR Range Home Dose  (if applicable)   Diet/Feeding Tube   Mechanical AVR/MVR 2.5-3.5 1mg PO Qday (patient non-adherent >1 month) Diet NPO Exceptions are: Sips of Water with Meds  ADULT TUBE FEEDING; Nasogastric; Standard with Fiber; Continuous; 10; Yes; 10; Q 4 hours; 60; 500; Q 3 hours; Protein; 1 Dose; Daily          TSH:    Lab Results   Component Value Date/Time    TSH 1.69 02/18/2025 12:29 PM        Hepatic Function Panel:                            Lab Results   Component Value Date/Time    ALKPHOS 206 03/03/2025 05:36 AM    ALT 17 03/03/2025 05:36 AM    AST 48 03/03/2025 05:36 AM    BILITOT 1.7 03/03/2025 05:36 AM    BILIDIR 2.3 02/21/2025 04:55 AM    IBILI 1.1 02/21/2025 04:55 AM       Date Warfarin Dose INR Heparin or LMWH HBG/HCT PLT Comment   2/17/25 HOLD 4.6 -- 7.0/22.3 100    2/18/25 HOLD 4.6 -- 6.0/19.1 58    2/19 HOLD 3.4 --- 7.3/23.8 73 (S/p 2 units PRBCs)   2/20 HOLD 2.8 --- 7.4/24 ---    2/21 HOLD 2.1 --- 7.6/25.1 80    2/22 HOLD 1.8 Heparin gtt 7.5/25.1 102    2/23 HOLD 1.8 Heparin gtt 7.1/23.9 ---    2/24 HOLD 1.7 Heparin gtt 6.8/21.4 84    2/25 HOLD 1.5 Heparin gtt 7.5/25.8 91    2/26 HOLD 1.4 Heparin gtt 7.5/25.4 92    - - - -   Warfarin stopped   2/28 1 mg 1.4 Heparin gtt   Re-consulted   3/1 No dose given (patient bleeding per nursing note) 1.3 Heparin gtt 6.8/23.1  1 unit PRBC ordered   3/2 < 1 mg > 1.3 Heparin gtt 7.4/25.1       Assessment:  Len Dorsey is a 59 y.o. male with a history of mechanical valve replacement anticoagulated with warfarin  PMH sx for hepatic cirrhosis  Home dose: 1mg PO Qday. Per patient, non-adherent with 
Pharmacy Consultation Note  (Warfarin Dosing and Monitoring)    Initial consult date: 2/17/2025  Consulting physician: Santo Long MD    Allergies:  Patient has no known allergies.    Ht Readings from Last 1 Encounters:   02/24/25 1.778 m (5' 10\")     Wt Readings from Last 1 Encounters:   03/05/25 119 kg (262 lb 5.6 oz)         Warfarin Indication Target   INR Range Home Dose  (if applicable)   Diet/Feeding Tube   Mechanical AVR/MVR 2.5-3.5 1mg PO Qday (patient non-adherent >1 month) ADULT TUBE FEEDING; Nasogastric; Standard with Fiber; Continuous; 10; Yes; 10; Q 4 hours; 60; 500; Q 3 hours; Protein; 1 Dose; Daily          TSH:    Lab Results   Component Value Date/Time    TSH 1.69 02/18/2025 12:29 PM        Hepatic Function Panel:                            Lab Results   Component Value Date/Time    ALKPHOS 208 03/06/2025 04:30 AM    ALT 32 03/06/2025 04:30 AM    AST 68 03/06/2025 04:30 AM    BILITOT 1.8 03/06/2025 04:30 AM    BILIDIR 2.3 02/21/2025 04:55 AM    IBILI 1.1 02/21/2025 04:55 AM       Date Warfarin Dose INR Heparin or LMWH HBG/HCT PLT Comment   2/17/25 HOLD 4.6 -- 7.0/22.3 100    2/18/25 HOLD 4.6 -- 6.0/19.1 58    2/19 HOLD 3.4 --- 7.3/23.8 73 (S/p 2 units PRBCs)   2/20 HOLD 2.8 --- 7.4/24 ---    2/21 HOLD 2.1 --- 7.6/25.1 80    2/22 HOLD 1.8 Heparin gtt 7.5/25.1 102    2/23 HOLD 1.8 Heparin gtt 7.1/23.9 ---    2/24 HOLD 1.7 Heparin gtt 6.8/21.4 84    2/25 HOLD 1.5 Heparin gtt 7.5/25.8 91    2/26 HOLD 1.4 Heparin gtt 7.5/25.4 92    - - - -   Warfarin stopped   2/28 1 mg 1.4 Heparin gtt   Re-consulted   3/1 No dose given (patient bleeding per nursing note) 1.3 Heparin gtt 6.8/23.1  1 unit PRBC ordered   3/2 Not given 1.3 Heparin gtt 7.4/25.1     3/3 1 mg 1.4 Heparin gtt      3/4 1 mg 1.5 Heparin gtt      3/5 1 mg 1.7 Heparin gtt      3/6  1.6 Heparin gtt 8.0/27.6 93 Failed swallow eval     Assessment:  Len Sunita is a 59 y.o. male with a history of mechanical valve replacement anticoagulated 
Pharmacy Consultation Note  (Warfarin Dosing and Monitoring)    Initial consult date: 2/17/2025  Consulting physician: Santo Long MD    Allergies:  Patient has no known allergies.    Ht Readings from Last 1 Encounters:   02/24/25 1.778 m (5' 10\")     Wt Readings from Last 1 Encounters:   03/08/25 102.1 kg (225 lb 1.4 oz)         Warfarin Indication Target   INR Range Home Dose  (if applicable)   Diet/Feeding Tube   Mechanical AVR/MVR 2.5-3.5 1mg PO Qday (patient non-adherent >1 month) ADULT DIET; Dysphagia - Soft and Bite Sized; Low Sodium (2 gm); Mildly Thick (Nectar)          TSH:    Lab Results   Component Value Date/Time    TSH 1.69 02/18/2025 12:29 PM        Hepatic Function Panel:                            Lab Results   Component Value Date/Time    ALKPHOS 251 03/10/2025 04:20 AM    ALT 30 03/10/2025 04:20 AM    AST 48 03/10/2025 04:20 AM    BILITOT 2.0 03/10/2025 04:20 AM    BILIDIR 2.3 02/21/2025 04:55 AM    IBILI 1.1 02/21/2025 04:55 AM       Date Warfarin Dose INR Heparin or LMWH HBG/HCT PLT Comment   2/17/25 HOLD 4.6 -- 7.0/22.3 100    2/18/25 HOLD 4.6 -- 6.0/19.1 58    2/19 HOLD 3.4 --- 7.3/23.8 73 (S/p 2 units PRBCs)   2/20 HOLD 2.8 --- 7.4/24 ---    2/21 HOLD 2.1 --- 7.6/25.1 80    2/22 HOLD 1.8 Heparin gtt 7.5/25.1 102    2/23 HOLD 1.8 Heparin gtt 7.1/23.9 ---    2/24 HOLD 1.7 Heparin gtt 6.8/21.4 84    2/25 HOLD 1.5 Heparin gtt 7.5/25.8 91    2/26 HOLD 1.4 Heparin gtt 7.5/25.4 92    - - - -  88 Warfarin stopped   2/28 1 mg 1.4 Heparin gtt  76 Re-consulted   3/1 No dose given (patient bleeding per nursing note) 1.3 Heparin gtt 6.8/23.1 83 1 unit PRBC ordered   3/2 Not given 1.3 Heparin gtt 7.4/25.1 94    3/3 1 mg 1.4 Heparin gtt 8.1/28.5 98    3/4 1 mg 1.5 Heparin gtt 8.2/28 98    3/5 1 mg 1.7 Heparin gtt 7.8/26.6 99    3/6 HELD - NPO 1.6 Heparin gtt 8.0/27.6 93 Failed swallow eval   3/7 1mg  1.9 Heparin gtt 8.7/30.5 98    3/8 2 mg 1.7 Heparin gtt 9/32.3 89    3/9 2 mg 1.6 Heparin gtt 9.1/30.6 
Pharmacy Consultation Note  (Warfarin Dosing and Monitoring)    Initial consult date: 2/17/2025  Consulting physician: Santo Long MD    Allergies:  Patient has no known allergies.    Ht Readings from Last 1 Encounters:   02/24/25 1.778 m (5' 10\")     Wt Readings from Last 1 Encounters:   03/08/25 102.1 kg (225 lb 1.4 oz)         Warfarin Indication Target   INR Range Home Dose  (if applicable)   Diet/Feeding Tube   Mechanical AVR/MVR 2.5-3.5 1mg PO Qday (patient non-adherent >1 month) ADULT DIET; Dysphagia - Soft and Bite Sized; Mildly Thick (Nectar)          TSH:    Lab Results   Component Value Date/Time    TSH 1.69 02/18/2025 12:29 PM        Hepatic Function Panel:                            Lab Results   Component Value Date/Time    ALKPHOS 250 03/09/2025 05:29 AM    ALT 33 03/09/2025 05:29 AM    AST 64 03/09/2025 05:29 AM    BILITOT 2.2 03/09/2025 05:29 AM    BILIDIR 2.3 02/21/2025 04:55 AM    IBILI 1.1 02/21/2025 04:55 AM       Date Warfarin Dose INR Heparin or LMWH HBG/HCT PLT Comment   2/17/25 HOLD 4.6 -- 7.0/22.3 100    2/18/25 HOLD 4.6 -- 6.0/19.1 58    2/19 HOLD 3.4 --- 7.3/23.8 73 (S/p 2 units PRBCs)   2/20 HOLD 2.8 --- 7.4/24 ---    2/21 HOLD 2.1 --- 7.6/25.1 80    2/22 HOLD 1.8 Heparin gtt 7.5/25.1 102    2/23 HOLD 1.8 Heparin gtt 7.1/23.9 ---    2/24 HOLD 1.7 Heparin gtt 6.8/21.4 84    2/25 HOLD 1.5 Heparin gtt 7.5/25.8 91    2/26 HOLD 1.4 Heparin gtt 7.5/25.4 92    - - - -  88 Warfarin stopped   2/28 1 mg 1.4 Heparin gtt  76 Re-consulted   3/1 No dose given (patient bleeding per nursing note) 1.3 Heparin gtt 6.8/23.1 83 1 unit PRBC ordered   3/2 Not given 1.3 Heparin gtt 7.4/25.1 94    3/3 1 mg 1.4 Heparin gtt 8.1/28.5 98    3/4 1 mg 1.5 Heparin gtt 8.2/28 98    3/5 1 mg 1.7 Heparin gtt 7.8/26.6 99    3/6 HELD - NPO 1.6 Heparin gtt 8.0/27.6 93 Failed swallow eval   3/7 1mg  1.9 Heparin gtt 8.7/30.5 98    3/8 2 mg 1.7 Heparin gtt 9/32.3 89    3/9 2 mg 1.6 Heparin gtt 9.1/30.6 120                   
Pharmacy Consultation Note  (Warfarin Dosing and Monitoring)    Initial consult date: 2/17/2025  Consulting physician: Santo Long MD    Allergies:  Patient has no known allergies.    Ht Readings from Last 1 Encounters:   03/10/25 1.778 m (5' 10\")     Wt Readings from Last 1 Encounters:   03/12/25 104.3 kg (230 lb)         Warfarin Indication Target   INR Range Home Dose  (if applicable)   Diet/Feeding Tube   Mechanical AVR/MVR 2.5-3.5 1mg PO Qday (patient non-adherent >1 month) ADULT DIET; Dysphagia - Soft and Bite Sized; Low Sodium (2 gm); Mildly Thick (Nectar)  ADULT ORAL NUTRITION SUPPLEMENT; Lunch, Dinner; Frozen Oral Supplement          TSH:    Lab Results   Component Value Date/Time    TSH 1.69 02/18/2025 12:29 PM        Hepatic Function Panel:                            Lab Results   Component Value Date/Time    ALKPHOS 237 03/12/2025 06:45 AM    ALT 28 03/12/2025 06:45 AM    AST 57 03/12/2025 06:45 AM    BILITOT 1.7 03/12/2025 06:45 AM    BILIDIR 2.3 02/21/2025 04:55 AM    IBILI 1.1 02/21/2025 04:55 AM       Date Warfarin Dose INR Heparin or LMWH HBG/HCT PLT Comment   2/17/25 HOLD 4.6 -- 7.0/22.3 100    2/18/25 HOLD 4.6 -- 6.0/19.1 58    2/19 HOLD 3.4 --- 7.3/23.8 73 (S/p 2 units PRBCs)   2/20 HOLD 2.8 --- 7.4/24 ---    2/21 HOLD 2.1 --- 7.6/25.1 80    2/22 HOLD 1.8 Heparin gtt 7.5/25.1 102    2/23 HOLD 1.8 Heparin gtt 7.1/23.9 ---    2/24 HOLD 1.7 Heparin gtt 6.8/21.4 84    2/25 HOLD 1.5 Heparin gtt 7.5/25.8 91    2/26 HOLD 1.4 Heparin gtt 7.5/25.4 92    - - - -  88 Warfarin stopped   2/28 1 mg 1.4 Heparin gtt  76 Re-consulted   3/1 No dose given (patient bleeding per nursing note) 1.3 Heparin gtt 6.8/23.1 83 1 unit PRBC ordered   3/2 Not given 1.3 Heparin gtt 7.4/25.1 94    3/3 1 mg 1.4 Heparin gtt 8.1/28.5 98    3/4 1 mg 1.5 Heparin gtt 8.2/28 98    3/5 1 mg 1.7 Heparin gtt 7.8/26.6 99    3/6 HELD - NPO 1.6 Heparin gtt 8.0/27.6 93 Failed swallow eval   3/7 1mg  1.9 Heparin gtt 8.7/30.5 98    3/8 2 mg 
Pharmacy Consultation Note  (Warfarin Dosing and Monitoring)    Initial consult date: 2/17/2025  Consulting physician: Santo Long MD    Allergies:  Patient has no known allergies.    Ht Readings from Last 1 Encounters:   03/10/25 1.778 m (5' 10\")     Wt Readings from Last 1 Encounters:   03/14/25 100.6 kg (221 lb 12.5 oz)         Warfarin Indication Target   INR Range Home Dose  (if applicable)   Diet/Feeding Tube   Mechanical AVR/MVR 2.5-3.5 1mg PO Qday (patient non-adherent >1 month) ADULT DIET; Dysphagia - Soft and Bite Sized; Low Sodium (2 gm); Mildly Thick (Nectar)  ADULT ORAL NUTRITION SUPPLEMENT; Lunch, Dinner; Frozen Oral Supplement          TSH:    Lab Results   Component Value Date/Time    TSH 1.69 02/18/2025 12:29 PM        Hepatic Function Panel:                            Lab Results   Component Value Date/Time    ALKPHOS 264 03/14/2025 04:58 AM    ALT 35 03/14/2025 04:58 AM    AST 74 03/14/2025 04:58 AM    BILITOT 1.7 03/14/2025 04:58 AM    BILIDIR 2.3 02/21/2025 04:55 AM    IBILI 1.1 02/21/2025 04:55 AM       Date Warfarin Dose INR Heparin or LMWH HBG/HCT PLT Comment   2/17/25 HOLD 4.6 -- 7.0/22.3 100    2/18/25 HOLD 4.6 -- 6.0/19.1 58    2/19 HOLD 3.4 --- 7.3/23.8 73 (S/p 2 units PRBCs)   2/20 HOLD 2.8 --- 7.4/24 ---    2/21 HOLD 2.1 --- 7.6/25.1 80    2/22 HOLD 1.8 Heparin gtt 7.5/25.1 102    2/23 HOLD 1.8 Heparin gtt 7.1/23.9 ---    2/24 HOLD 1.7 Heparin gtt 6.8/21.4 84    2/25 HOLD 1.5 Heparin gtt 7.5/25.8 91    2/26 HOLD 1.4 Heparin gtt 7.5/25.4 92    - - - -  88 Warfarin stopped   2/28 1 mg 1.4 Heparin gtt  76 Re-consulted   3/1 No dose given (patient bleeding per nursing note) 1.3 Heparin gtt 6.8/23.1 83 1 unit PRBC ordered   3/2 Not given 1.3 Heparin gtt 7.4/25.1 94    3/3 1 mg 1.4 Heparin gtt 8.1/28.5 98    3/4 1 mg 1.5 Heparin gtt 8.2/28 98    3/5 1 mg 1.7 Heparin gtt 7.8/26.6 99    3/6 HELD - NPO 1.6 Heparin gtt 8.0/27.6 93 Failed swallow eval   3/7 1mg  1.9 Heparin gtt 8.7/30.5 98  
Pharmacy Consultation Note  (Warfarin Dosing and Monitoring)    Initial consult date: 2/17/2025  Consulting physician: Santo Long MD    Allergies:  Patient has no known allergies.    Ht Readings from Last 1 Encounters:   03/10/25 1.778 m (5' 10\")     Wt Readings from Last 1 Encounters:   03/14/25 100.6 kg (221 lb 12.5 oz)         Warfarin Indication Target   INR Range Home Dose  (if applicable)   Diet/Feeding Tube   Mechanical AVR/MVR 2.5-3.5 1mg PO Qday (patient non-adherent >1 month) ADULT DIET; Dysphagia - Soft and Bite Sized; Low Sodium (2 gm); Mildly Thick (Nectar)  ADULT ORAL NUTRITION SUPPLEMENT; Lunch, Dinner; Frozen Oral Supplement          TSH:    Lab Results   Component Value Date/Time    TSH 1.69 02/18/2025 12:29 PM        Hepatic Function Panel:                            Lab Results   Component Value Date/Time    ALKPHOS 264 03/14/2025 04:58 AM    ALT 35 03/14/2025 04:58 AM    AST 74 03/14/2025 04:58 AM    BILITOT 1.7 03/14/2025 04:58 AM    BILIDIR 2.3 02/21/2025 04:55 AM    IBILI 1.1 02/21/2025 04:55 AM       Date Warfarin Dose INR Heparin or LMWH HBG/HCT PLT Comment   2/17/25 HOLD 4.6 -- 7.0/22.3 100    2/18/25 HOLD 4.6 -- 6.0/19.1 58    2/19 HOLD 3.4 --- 7.3/23.8 73 (S/p 2 units PRBCs)   2/20 HOLD 2.8 --- 7.4/24 ---    2/21 HOLD 2.1 --- 7.6/25.1 80    2/22 HOLD 1.8 Heparin gtt 7.5/25.1 102    2/23 HOLD 1.8 Heparin gtt 7.1/23.9 ---    2/24 HOLD 1.7 Heparin gtt 6.8/21.4 84    2/25 HOLD 1.5 Heparin gtt 7.5/25.8 91    2/26 HOLD 1.4 Heparin gtt 7.5/25.4 92    - - - -  88 Warfarin stopped   2/28 1 mg 1.4 Heparin gtt  76 Re-consulted   3/1 No dose given (patient bleeding per nursing note) 1.3 Heparin gtt 6.8/23.1 83 1 unit PRBC ordered   3/2 Not given 1.3 Heparin gtt 7.4/25.1 94    3/3 1 mg 1.4 Heparin gtt 8.1/28.5 98    3/4 1 mg 1.5 Heparin gtt 8.2/28 98    3/5 1 mg 1.7 Heparin gtt 7.8/26.6 99    3/6 HELD - NPO 1.6 Heparin gtt 8.0/27.6 93 Failed swallow eval   3/7 1mg  1.9 Heparin gtt 8.7/30.5 98  
Pharmacy Consultation Note  (Warfarin Dosing and Monitoring)    Initial consult date: 2/17/2025  Consulting physician: Santo Long MD    Allergies:  Patient has no known allergies.    Ht Readings from Last 1 Encounters:   03/10/25 1.778 m (5' 10\")     Wt Readings from Last 1 Encounters:   03/16/25 100.6 kg (221 lb 12.5 oz)         Warfarin Indication Target   INR Range Home Dose  (if applicable)   Diet/Feeding Tube   Mechanical AVR/MVR 2.5-3.5 1mg PO Qday (patient non-adherent >1 month) ADULT DIET; Dysphagia - Soft and Bite Sized; Low Sodium (2 gm); Mildly Thick (Nectar)  ADULT ORAL NUTRITION SUPPLEMENT; Lunch, Dinner; Frozen Oral Supplement          TSH:    Lab Results   Component Value Date/Time    TSH 1.69 02/18/2025 12:29 PM        Hepatic Function Panel:                            Lab Results   Component Value Date/Time    ALKPHOS 225 03/16/2025 04:10 AM    ALT 33 03/16/2025 04:10 AM    AST 61 03/16/2025 04:10 AM    BILITOT 1.7 03/16/2025 04:10 AM    BILIDIR 2.3 02/21/2025 04:55 AM    IBILI 1.1 02/21/2025 04:55 AM       Date Warfarin Dose INR Heparin or LMWH HBG/HCT PLT Comment   2/17/25 HOLD 4.6 -- 7.0/22.3 100    2/18/25 HOLD 4.6 -- 6.0/19.1 58    2/19 HOLD 3.4 --- 7.3/23.8 73 (S/p 2 units PRBCs)   2/20 HOLD 2.8 --- 7.4/24 ---    2/21 HOLD 2.1 --- 7.6/25.1 80    2/22 HOLD 1.8 Heparin gtt 7.5/25.1 102    2/23 HOLD 1.8 Heparin gtt 7.1/23.9 ---    2/24 HOLD 1.7 Heparin gtt 6.8/21.4 84    2/25 HOLD 1.5 Heparin gtt 7.5/25.8 91    2/26 HOLD 1.4 Heparin gtt 7.5/25.4 92    - - - -  88 Warfarin stopped   2/28 1 mg 1.4 Heparin gtt  76 Re-consulted   3/1 No dose given (patient bleeding per nursing note) 1.3 Heparin gtt 6.8/23.1 83 1 unit PRBC ordered   3/2 Not given 1.3 Heparin gtt 7.4/25.1 94    3/3 1 mg 1.4 Heparin gtt 8.1/28.5 98    3/4 1 mg 1.5 Heparin gtt 8.2/28 98    3/5 1 mg 1.7 Heparin gtt 7.8/26.6 99    3/6 HELD - NPO 1.6 Heparin gtt 8.0/27.6 93 Failed swallow eval   3/7 1mg  1.9 Heparin gtt 8.7/30.5 98  
Pharmacy Consultation Note  (Warfarin Dosing and Monitoring)    Initial consult date: 2/17/2025  Consulting physician: Santo Long MD    Allergies:  Patient has no known allergies.    Ht Readings from Last 1 Encounters:   03/10/25 1.778 m (5' 10\")     Wt Readings from Last 1 Encounters:   03/17/25 100.2 kg (220 lb 14.4 oz)       Warfarin Indication Target   INR Range Home Dose  (if applicable)   Diet/Feeding Tube   Mechanical AVR/MVR 2.5-3.5 1mg PO Qday (patient non-adherent >1 month) ADULT DIET; Dysphagia - Soft and Bite Sized; Low Sodium (2 gm); Mildly Thick (Nectar)  ADULT ORAL NUTRITION SUPPLEMENT; Lunch, Dinner; Frozen Oral Supplement          TSH:    Lab Results   Component Value Date/Time    TSH 1.69 02/18/2025 12:29 PM      Hepatic Function Panel:              Lab Results   Component Value Date/Time    ALKPHOS 209 03/17/2025 06:08 AM    ALT 30 03/17/2025 06:08 AM    AST 58 03/17/2025 06:08 AM    BILITOT 1.6 03/17/2025 06:08 AM    BILIDIR 2.3 02/21/2025 04:55 AM    IBILI 1.1 02/21/2025 04:55 AM       Date Warfarin Dose INR Heparin or LMWH HBG/HCT PLT Comment   2/17 HOLD 4.6 --- 7.0/22.3 100    2/18 HOLD 4.6 --- 6.0/19.1 58    2/19 HOLD 3.4 --- 7.3/23.8 73 (S/p 2 units PRBCs on 2/18)   2/20 HOLD 2.8 --- 7.4/24 ---    2/21 HOLD 2.1 --- 7.6/25.1 80    2/22 HOLD 1.8 Heparin gtt 7.5/25.1 102    2/23 HOLD 1.8 Heparin gtt 7.1/23.9 ---    2/24 HOLD 1.7 Heparin gtt 6.8/21.4 84    2/25 HOLD 1.5 Heparin gtt 7.5/25.8 91 (S/p 1 unit PRBCs on 2/24)   2/26 HOLD 1.4 Heparin gtt 7.5/25.4 92    - - - -  88 Warfarin stopped   2/28 1 mg 1.4 Heparin gtt  76 Re-consulted   3/1 No dose given (patient bleeding per nursing note) 1.3 Heparin gtt 6.8/23.1 83    3/2 Not given 1.3 Heparin gtt 7.4/25.1 94 (S/p 1 unit PRBCs on 3/1)   3/3 1 mg 1.4 Heparin gtt 8.1/28.5 98    3/4 1 mg 1.5 Heparin gtt 8.2/28 98    3/5 1 mg 1.7 Heparin gtt 7.8/26.6 99    3/6 HELD - NPO 1.6 Heparin gtt 8.0/27.6 93 Failed swallow eval   3/7 1mg  1.9 Heparin 
Physical Therapy    Date: 2025       Patient Name: Len Dorsey  : 1965      MRN: [id number]    Physical therapy order received and chart reviewed. PT evaluation held at this time per RN 2/2 agitation.  Will follow up as appropriate.     Toña Guillermo PT, DPT  [id number]       
Physical Therapy    PT orders received for evaluation/treatment.  Pt chart reviewed and discussed with nursing staff.  Pt is not currently appropriate for PT services d/t medical status.  PT will sign off at this time.  Please re-consult when needed.  Thank you.     Florentino Barnard, PT, DPT  [id number]       
Physical Therapy  Treatment    Name: Len Dorsey  : 1965  MRN: [id number]      Date of Service: 3/14/2025    Evaluating PT: Daniel Harvey, PT, DPT, [id number]      Room #:  8415/8415-B  Diagnosis:  Hypokalemia [E87.6]  Lactic acidosis [E87.20]  ETOH abuse [F10.10]  Hypoxia [R09.02]  Elevated partial thromboplastin time (PTT) [R79.1]  Supratherapeutic INR [R79.1]  Multiple falls [R29.6]  Closed head injury, initial encounter [S09.90XA]  Traumatic ecchymosis of flank, initial encounter [S30.1XXA]  Congestive heart failure, unspecified HF chronicity, unspecified heart failure type (HCC) [I50.9]  Acute hypoxic respiratory failure (HCC) [J96.01]  PMHx/PSHx:   has a past medical history of Alcoholic liver disease, H/O prosthetic aortic valve replacement, History of mitral valve replacement, Stroke (HCC), and Testicular cancer (HCC).  Procedure/Surgery:  N/A  Precautions:  Fall risk, cognition, TSM,   Equipment Needs:  NA    SUBJECTIVE:    Pt is a questionable historian. Pt reports he lives with his fiance in a 2 story house with 13 stair(s) and 2 rail(s) to enter. Pt ambulated with WW prior to admission. Pt with inappropriate answers to some questions.    OBJECTIVE:   Initial Evaluation  Date: 3/10/25 Treatment Date:  3/14/25 Short Term/ Long Term   Goals   AM-PAC 6 Clicks 10/24 10/24    Was pt agreeable to Eval/treatment? yes yes    Does pt have pain? 6/10 left flank pain 8/10 LBP    Bed Mobility  Rolling: NT  Supine to sit: ModA x2  Sit to supine:   Scooting: MaxA x2 Rolling: NT  Supine to sit: ModA x2  Sit to supine: ModA x2   Scooting: Mod x2 Rolling: Independent    Supine to sit: Independent    Sit to supine: Independent    Scooting: Independent     Transfers Sit to stand: ModA x2  Stand to sit: modA x2  Stand pivot: NT NT Sit to stand: Independent    Stand to sit: Independent    Stand pivot: Independent     Ambulation   Attempted side steps but declined d/t weakness NT >200 feet with Sup with WW   Stair 
Pt attempted to get oob, His toe nails cut the back of his right achilias area. Pressure held and foam pad placed.  Pt cussing and swinging at staff. Len is unable to be reoriented.  Bed alarm on and tele sitter next to patient.    
Pt became very agitated, ripped out IV and smeared his blood all over his hands, pulled off his monitor, and was attempting to get out of bed. Dr. Yusuf notified and 10mg of IM Geodon given. New IV access placed in left upper arm and wrapped with mann. Pt then had a bowel movement and proceeded to smear it all over his hands, his bed, and his legs. Pt was again cleaned up. Monitor was replaced and continuous pulse ox placed. Pt has chewed off his continuous pulse ox and refuses to leave it on and keeps attempting to get out of bed to go over to see if the stove works. Pt is not easily redirectable at this time.   Dr. Yusuf made aware and reaching out to ICU attending.     Electronically signed by Silvia Tee RN on 2/21/2025 at 2:33 PM    
Pt is on room air, continuous oxygen saturation 95%  
Pt reaches for life saving tubes and lines and kicks his legs over the side rales. Restraints continued at this time for pt's safety  
Pt reaches for life saving tubes and lines and kicks his legs over the side rales. Restraints continued at this time for pt's safety.  
RN attempted to drawn morning labs unable to get. Message sent to IV team   
Restraint type: Soft restraint:  right wrist and left wrist  Reason for restraints: Pulling out devices:  Unable to follow directions/instructions  Duration: 24 hours    Restraints must be removed when an alternative is available and effective and/or patient no longer meets criteria.    Orders must be renewed every calendar day or when discontinued.    The MD must conduct a face to face assessment within 1 calendar day of initiation when initial restraint order is verbal.   
SPEECH LANGUAGE PATHOLOGY  DAILY PROGRESS NOTE        PATIENT NAME:  Len Dorsey      :  1965          TODAY'S DATE:  3/13/2025 ROOM:  [address] [place] [address]    Current diet: ADULT DIET; Dysphagia - Soft and Bite Sized; Low Sodium (2 gm); Mildly Thick (Nectar)  ADULT ORAL NUTRITION SUPPLEMENT; Lunch, Dinner; Frozen Oral Supplement    Pt seen for swallowing tx.  Pt generally unpleasant throughout session. Completed diagnostic swallows of NTL via cup.  Pt utilized safe swallow strategies of decreased rate and amount of intake with min-mod cues. Pt voiced displeasure with nectar thick liquids, was educated @ length on and verbalized fair understanding of results of MBSS which revealed silent aspiration on thin liquids, to which pt stated \"that is a bunch of bull.\"  Completed effortful swallow ex with 5 reps and mod cues.  Continue Soft and Bite Sized diet and NTL.  Continue ST per POC.      CPT code(s) 70104  dysphagia tx  Total minutes :  14 minutes      Megan Almeida MA, CCC-SLP  Speech-Language Pathologist  #[id number]   
SPEECH LANGUAGE PATHOLOGY  DAILY PROGRESS NOTE      PATIENT NAME:  Len Dorsey      :  1965          TODAY'S DATE:  3/11/2025 [facility]    Current Diet: ADULT DIET; Dysphagia - Soft and Bite Sized; Low Sodium (2 gm); Mildly Thick (Nectar)  ADULT ORAL NUTRITION SUPPLEMENT; Lunch, Dinner; Frozen Oral Supplement    RN cleared patient for dysphagia therapy on this date. She reported he has not had any issues with the recommended diet (soft and bite size with mildly thick liquid). However, RN stated that patient has been chewing his medication. Therapist recommended crushing the medication in puree due to his confusion. During session, patient appeared fatigued and required verbal cues to increase his participation with his breakfast tray. He demonstrated mild-moderate oral phase dysphagia as noted by prolonged mastication and impulsivity with mildly liquids via cup. Patient benefited from verbal cues to take small, individual cup sips to reduce his risk for choking. However, he did not appear to independently carry-over the strategy due to his cognitive status. No overt s/s of aspiration were displayed with the meal. Furthermore, therapist attempted patient to complete dysphagia exercises to improve his swallow function. Patient declined to participate in the exercises despite verbal encouragement.     Recommendation: Patient is safely consuming a soft and bite size diet with mildly thick liquid. Intermittent supervision is required at meals due to his cognitive status and impulsivity. Medication should also be crushed in puree.       CPT code(s) 52008  dysphagia tx  Total minutes :  15 minutes    Marcus Chapa M.S., CCC-SLP/L   Speech-Language Pathologist  [id number]        
SPEECH LANGUAGE PATHOLOGY  DAILY PROGRESS NOTE      PATIENT NAME:  Len Dorsey      :  1965          TODAY'S DATE:  3/12/2025 [facility]    Current Diet: ADULT DIET; Dysphagia - Soft and Bite Sized; Low Sodium (2 gm); Mildly Thick (Nectar)  ADULT ORAL NUTRITION SUPPLEMENT; Lunch, Dinner; Frozen Oral Supplement    RN cleared patient for dysphagia therapy. Patient appeared less agitated than the previous session and agreed to participate in dysphagia exercises to aid in his swallow function. He completed x10 effortful swallows to target increased oral and base of tongue pressure, increased pharyngeal constrictor contractions, and increased UES relaxation duration to reduce pharyngeal residue as shown on his most recent modified barium swallow study. Patient also completed x3 sets of 10 lingual protrusion against resistance exercises to improve lingual strength. He required minimal verbal cues to sustain attention to dysphagia exercises as well as increased processing time to elicit effortful swallows. No overt s/s of aspiration were noted with intermittent sips of mildly thick liquid via cup while completing the exercises.     Recommendation: Patient is recommended to continue the soft and bite size diet with mildly thick liquid. He is also recommended to continue with the dysphagia exercises/Maneuvers to improve his swallow function.       CPT code(s) 29209  dysphagia tx  Total minutes :  15 minutes    Marcus Chapa M.S., CCC-SLP/L   Speech-Language Pathologist  [id number]        
Spiritual Health History and Assessment/Progress Note  [facility]    (P) Spiritual/Emotional Needs,  ,  ,      Name: Len Dorsey MRN: [id number]    Age: 59 y.o.     Sex: male   Language: English   Amish: Religious   Hypoxia     Date: 3/7/2025                           Spiritual Assessment began in [facility]        Referral/Consult From: (P) Rounding   Encounter Overview/Reason: (P) Spiritual/Emotional Needs  Service Provided For: (P) Patient    Fide, Belief, Meaning:   Patient identifies as spiritual  Family/Friends No family/friends present      Importance and Influence:  Patient has spiritual/personal beliefs that influence decisions regarding their health  Family/Friends No family/friends present    Community:  Patient feels well-supported. Support system includes: Spouse/Partner  Family/Friends No family/friends present    Assessment and Plan of Care:     Patient Interventions include: Facilitated expression of thoughts and feelings, Explored spiritual coping/struggle/distress, Affirmed coping skills/support systems, and Other: The Patient seemed to be experiencing some memory issues.  Family/Friends Interventions include: No family/friends present    Patient Plan of Care: Spiritual Care available upon further referral  Family/Friends Plan of Care: No family/friends present    Electronically signed by Chaplain Yudith on 3/7/2025 at 6:06 PM   
Spiritual Health History and Assessment/Progress Note  [facility]    Initial Encounter,  ,  ,      Name: Len Dorsey MRN: [id number]    Age: 59 y.o.     Sex: male   Language: English   Baptist: Episcopal   Hypoxia     Date: 2/19/2025                           Spiritual Assessment [place] [facility]        Referral/Consult From: Rounding   Encounter Overview/Reason: Initial Encounter  Service Provided For: Patient    Fide, Belief, Meaning:   Patient identifies as spiritual and is connected with a fide tradition or spiritual practice  Family/Friends No family/friends present      Importance and Influence:  Patient has spiritual/personal beliefs that influence decisions regarding their health  Family/Friends No family/friends present    Community:  Patient is connected with a spiritual community and feels well-supported. Support system includes: Spouse/Partner  Family/Friends No family/friends present    Assessment and Plan of Care:     Patient Interventions include: Facilitated expression of thoughts and feelings, Explored spiritual coping/struggle/distress, Engaged in theological reflection, Affirmed coping skills/support systems, and Provided sacramental/Judaism ritual  Family/Friends Interventions include: No family/friends present    Patient Plan of Care: Spiritual Care available upon further referral  Family/Friends Plan of Care: No family/friends present    Electronically signed by KAY VELASQUEZ on 2/19/2025 at 6:37 PM   
Spontaneous Parameters performed    VT = 330 ml  f = 33  B/M  Ve = 10 L/M  NIF = -43  cmH2O  VC = 1.0 L  RSBI = 107      Parameters obtained on PSV 5.     Performed by Traci Paulino RCP  
[facility]  Department of Internal Medicine   Internal Medicine Residency   MICU Progress Note    Patient:  Len Dorsey 59 y.o. male  MRN: [id number]     Date of Service: 3/6/2025    Allergy: Patient has no known allergies.    MICU stay: 12d 11h     Subjective     Patient is seen and evaluated at bedside. He is alert, oriented. No acute distress was present.     24h change:   - 14 beats v tach   - Not able to take lactulose - failed bedside swallow   - Left arm swelling, no signs of compartment syndrome    Objective     VS: /78   Pulse 94   Temp 99 °F (37.2 °C) (Axillary)   Resp 20   Ht 1.778 m (5' 10\")   Wt 119 kg (262 lb 5.6 oz)   SpO2 100%   BMI 37.64 kg/m²           I & O - 24hr:   Intake/Output Summary (Last 24 hours) at 3/6/2025 0755  Last data filed at 3/6/2025 0600  Gross per 24 hour   Intake 100 ml   Output 2401 ml   Net -2301 ml       Physical Exam:  General Appearance: Awake and alert, following some verbal commands  Neck: no adenopathy, no carotid bruit, no JVD, supple, symmetrical, trachea midline, and thyroid not enlarged, symmetric, no tenderness/mass/nodules  Lung: wheezes bilaterally  Heart: regular rate and rhythm, S1, S2 normal, no murmur, click, rub or gallop  Abdomen: soft, non-tender; bowel sounds normal; no masses,  no organomegaly  Extremities:  extremities normal, atraumatic, no cyanosis or edema  Musculoskeletal: No joint swelling, no muscle tenderness. ROM normal in all joints of extremities.   Neurologic: Mental status: Awake, alert, following all commands     Lines     site date days    Art line   None     TLC None     PICC None     Hemoaccess None       ABG:     Lab Results   Component Value Date/Time    PH 7.409 03/06/2025 05:23 AM    PCO2 35.1 03/06/2025 05:23 AM    PO2 84.8 03/06/2025 05:23 AM    HCO3 21.7 03/06/2025 05:23 AM    BE -2.5 03/06/2025 05:23 AM    THB 9.3 03/06/2025 05:23 AM    O2SAT 95.5 03/06/2025 05:23 AM        Medications 
Unable to release blood  bb not seeing order to transfuse- will print order    
Voicemail Left for girlfriend, Leonie to try to notify of Patient's transfer out of ICU.  
Voicemail left for pts major Hadley to notify her of pts transfer to higher level of care. New room number and MICU unit number provided for call back.   Electronically signed by Silvia Tee RN on 2/21/2025 at 6:59 PM      1906: Spoke with pts major Hadley who states that he has had this issue in the past and they have had to restrain him in the past. She states to \"do whatever you need to do\" as he has a history of becoming combative. At this point the pt has not been combative. Leonie states that she will not be able to come up tomorrow but is going to try to get here on Sunday.   Electronically signed by Silvia Tee RN on 2/21/2025 at 7:12 PM    
When patient is released from bilateral soft wrist restraints and bilateral soft ankle restraints patient pulls at life saving medical devices and ET tube. Patient remains in bilateral soft wrist restraints and in bilateral soft ankle restraints. Patient is stable, will continue to monitor.   
When patient is released from bilateral soft wrist restraints patient will reach for life saving medical devices, and ET tube. When patient is released from bilateral soft ankle restraints, patient kicks legs out of bed and at staff. Patient is unable to be redirected. Patient remains in bilateral soft wrist restraints and in bilateral soft ankle restraints for patient safety.  
    Assessment:  Len Dorsey is a 59 y.o. male with a history of mechanical valve replacement anticoagulated with warfarin  PMH sx for hepatic cirrhosis  Home dose: 1mg PO Qday. Per patient, non-adherent with Warfarin for >1 month, noted from previous notes.   INR goal 2.5 - 3.5  Failed swallow eval on 3/6, made NPO.     Plan:  No warfarin tonight d/t NPO status- continue heparin bridge.  Warfarin 1mg daily order still in place, may administer if NPO status changes.  Daily PT/INR until the INR is stable within the therapeutic range  Pharmacist will follow and monitor/adjust dosing as necessary    Thank you for this consult,    Ilan Pablo, [facility] 3/7/2025 10:58 AM   Pharmacy Ext [phone]  
  3/7 1mg  1.9 Heparin gtt 8.7/30.5 98    3/8 2 mg 1.7 Heparin gtt 9/32.3 89      Assessment:  Len Dorsey is a 59 y.o. male with a history of mechanical valve replacement anticoagulated with warfarin  PMH sx for hepatic cirrhosis  Home dose: 1mg PO Qday. Per patient, non-adherent with Warfarin for >1 month, noted from previous notes.   INR goal 2.5 - 3.5  3/8: INR 1.7, heparin drip continues with overlap to warfarin     Plan:  Warfarin 2 mg po x 1 dose for booster  Daily PT/INR until the INR is stable within the therapeutic range  Pharmacist will follow and monitor/adjust dosing as necessary    Thank you for this consult,    Latrell Vidal, PharmD, BCPS, BCCCP 3/8/2025 8:21 AM  Pharmacy Ext [phone]  
  3/8 2 mg 1.7 Heparin gtt 9/32.3 89    3/9 2 mg 1.6 Heparin gtt 9.1/30.6 120    3/10 2 mg 1.6 Heparin gtt 9.2/30.1 123    3/11 <3 mg> 1.6 Heparin gtt 9.2/29.2 134               Assessment:  Len Dorsey is a 59 y.o. male with a history of mechanical valve replacement anticoagulated with warfarin  PMH sx for hepatic cirrhosis  Home dose: 1mg PO Qday. Per patient, non-adherent with Warfarin for >1 month, noted from previous notes.   INR goal 2.5 - 3.5    Plan:  Warfarin 3 mg tonight  Daily PT/INR until the INR is stable within the therapeutic range  Pharmacist will follow and monitor/adjust dosing as necessary    Thank you for this consult,    Clarence Yeung, PharmD, BCPS 3/11/2025 9:29 AM  #[phone]    
0.9 % injection 5-40 mL  5-40 mL IntraVENous PRN Caitlyn Sagastume MD        0.9 % sodium chloride infusion   IntraVENous PRN Caitlyn Sagastume MD        polyethylene glycol (GLYCOLAX) packet 17 g  17 g Oral Daily PRN Caitlyn Sagastume MD        acetaminophen (TYLENOL) tablet 650 mg  650 mg Oral Q6H PRN Caitlyn Sagastume MD        Or    acetaminophen (TYLENOL) suppository 650 mg  650 mg Rectal Q6H PRN Caitlyn Sagastume MD        LORazepam (ATIVAN) tablet 1 mg  1 mg Oral Q1H PRN Caitlyn Sagastume MD        Or    LORazepam (ATIVAN) injection 1 mg  1 mg IntraVENous Q1H PRN Caitlyn Sagastume MD        Or    LORazepam (ATIVAN) tablet 2 mg  2 mg Oral Q1H PRN Caitlyn Sagastume MD        Or    LORazepam (ATIVAN) injection 2 mg  2 mg IntraVENous Q1H PRN Caitlyn Sagastume MD   2 mg at 02/23/25 0010    Or    LORazepam (ATIVAN) tablet 3 mg  3 mg Oral Q1H PRN Caitlyn Sagastume MD        Or    LORazepam (ATIVAN) injection 3 mg  3 mg IntraVENous Q1H PRN Caitlyn Sagastume MD        Or    LORazepam (ATIVAN) tablet 4 mg  4 mg Oral Q1H PRN Caitlyn Sagastume MD        Or    LORazepam (ATIVAN) injection 4 mg  4 mg IntraVENous Q1H PRN Caitlyn Sagastume MD   4 mg at 02/23/25 0333    thiamine (B-1) injection 500 mg  500 mg IntraVENous q8h Santo Long MD   500 mg at 02/23/25 0647    thiamine (B-1) injection 250 mg  250 mg IntraVENous Daily Santo Long MD   250 mg at 02/23/25 1007    [START ON 3/1/2025] thiamine (B-1) injection 100 mg  100 mg IntraVENous Daily Santo Long MD        PHENobarbital (LUMINAL) injection 32.5 mg  32.5 mg IntraVENous Q6H Santo Long MD   32.5 mg at 02/23/25 1120    Followed by    PHENobarbital (LUMINAL) injection 32.5 mg  32.5 mg IntraVENous Q12H Santo Long MD        Followed by    [START ON 2/24/2025] PHENobarbital (LUMINAL) injection 16.2 mg  16.2 mg IntraVENous Q12H Santo Long MD        heparin (porcine) injection 4,000 
2 mg 1.7 Heparin gtt 9/32.3 89    3/9 2 mg 1.6 Heparin gtt 9.1/30.6 120    3/10 2 mg 1.6 Heparin gtt 9.2/30.1 123    3/11 3 mg 1.6 Heparin gtt 9.2/29.2 134    3/12 3 mg 1.8 Heparin gtt 9.5/31.0 140    3/13 <5 mg> 1.7 Heparin gtt 9.7/31.0 139      Assessment:  Len Dorsey is a 59 y.o. male with a history of mechanical valve replacement anticoagulated with warfarin  PMH sx for hepatic cirrhosis  Home dose: 1mg PO Qday. Per patient, non-adherent with Warfarin for >1 month, noted from previous notes.   INR goal 2.5 - 3.5    Plan:  Warfarin 5 mg tonight  Daily PT/INR until the INR is stable within the therapeutic range  Pharmacist will follow and monitor/adjust dosing as necessary    Thank you for this consult,    Angélica ZarcoD, BCPS 3/13/2025 2:47 PM  [phone]  
2/26/2025  4:02 AM  Calculated from:  Serum Creatinine: 2.4 mg/dL at 2/26/2025  4:02 AM  Serum Sodium: 153 mmol/L (Using max of 137 mmol/L) at 2/26/2025  4:02 AM  Total Bilirubin: 1.8 mg/dL at 2/26/2025  4:02 AM  Serum Albumin: 3.0 g/dL at 2/26/2025  4:02 AM  INR(ratio): 1.4 at 2/26/2025  4:02 AM  Age at listing (hypothetical): 59 years  Sex: Male at 2/26/2025  4:02 AM     Sed Rate, Automated   Date Value Ref Range Status   02/22/2025 30 (H) 0 - 15 mm/Hr Final   02/07/2023 10 0 - 15 mm/Hr Final     Lipase   Date Value Ref Range Status   02/17/2025 40 13 - 60 U/L Final   08/02/2024 32 13 - 60 U/L Final   05/07/2024 20 13 - 60 U/L Final          US Result (most recent):  US DUP ABD PEL RETRO SCROT COMPLETE 05/08/2024    Narrative  EXAMINATION:  RIGHT UPPER QUADRANT ULTRASOUND    5/8/2024 1:55 pm    COMPARISON:  None.    HISTORY:  ORDERING SYSTEM PROVIDED HISTORY: Cirrhosis -rule out mass  TECHNOLOGIST PROVIDED HISTORY:    Reason for exam:->Cirrhosis -rule out mass  What reading provider will be dictating this exam?->CRC    FINDINGS:  LIVER:  Liver is suboptimally visualized.  Left lobe poorly visualized due to  body habitus and patient positioning.  Liver echogenicity is mildly  heterogeneous.    Portal vein is patent with a normal direction of flow.  Main portal vein  velocity is 48 cm/second, mildly increased.    Right and left portal veins appear patent.    Hepatic veins are patent with a normal direction of flow and normal flow  pulsatility.    BILIARY SYSTEM:  There is evidence of cholelithiasis.    Common bile duct is within normal limits measuring 4 mm.    RIGHT KIDNEY: Right kidney contains a 7 x 6 x 9 mm cystic lesion in the lower  pole.  Kidney demonstrates echogenic regions in the pyramids, consider  nephrocalcinosis.  The right kidney measures 10.3 x 5.9 x 5.2 cm.    PANCREAS:  Pancreas not well visualized.    OTHER: No evidence of right upper quadrant ascites    Limited abdominal ultrasound for 
3/16/2025 1228  Last data filed at 3/16/2025 0422  Gross per 24 hour   Intake --   Output 400 ml   Net -400 ml       Review of Systems  Could not be obtained      OBJECTIVE:    /67   Pulse (!) 102   Temp 97.8 °F (36.6 °C) (Oral)   Resp 18   Ht 1.778 m (5' 10\")   Wt 100.6 kg (221 lb 12.5 oz)   SpO2 98%   BMI 31.82 kg/m²     General appearance: Confused  HEENT:  Conjunctivae/corneas clear.   Neck: Supple. No jugular venous distention.   Respiratory: symmetrical; clear to auscultation bilaterally; no wheezes; no rhonchi; no rales  Cardiovascular: rhythm regular; rate controlled; no murmurs  Abdomen: Soft, nontender, nondistended  Extremities:  peripheral pulses present; no peripheral edema; no ulcers  Musculoskeletal: No clubbing, cyanosis, no bilateral lower extremity edema. Brisk capillary refill.   Skin:  No rashes  on visible skin  Neurologic: Normal speech    ASSESSMENT and PLAN:    Delirium tremens with alcohol withdrawal syndrome  CKD 3B  hyponatremia improving  Hypokalemia  Lactic acidosis  Liver cirrhosis  Acute on Chronic anemia  Chronic alcoholic  Coagulopathy/supratherapeutic INR  History of heart valve replacement on warfarin  History of AICD  Elevated QTc  Multiple falls     Plan  Patient transferred to intensive care unit 02/21 as patient had worsening mentation, Intubated, extubated and transferred out of the ICU.  Off Levophed  Completed cefepime  Blood culture x 2-NGTD  Status post 4 unit of packed RBC transfusion,   Monitor hemoglobin and transfuse if less than 7  S/p egd and biopsy-moderate portal hypertensive gastropathy  GI following  Continue protonix  Replace electrolyte as needed  Seizure precautions  Continue thiamine  Continue home medications  Patient has not taken Coumadin for a month, INR elevation is likely secondary to coagulopathy with liver cirrhosis,  Continue heparin drip, INR subtherapeutic   INR daily; goal INR 2.5-3.5, INR 2.7 today, will d/c heparin gtt. Will receive 
AM    PCO2 30.5 03/04/2025 04:20 AM    PO2 76.3 03/04/2025 04:20 AM    HCO3 22.2 03/04/2025 04:20 AM    BE -0.8 03/04/2025 04:20 AM    THB 9.8 03/04/2025 04:20 AM    O2SAT 95.1 03/04/2025 04:20 AM        Medications     Infusions: (Fluid, Sedation, Vasopressors)  Drips  Heparin 13 units/kg/h  Vasopressors  None  Sedation  Precedex 0.4 mcg/kg/hr - weaned off     Nutrition:   ADULT TUBE FEEDING; Nasogastric; Standard with Fiber; Continuous; 10; Yes; 10; Q 4 hours; 60; 500; Q 3 hours; Protein; 1 Dose; Daily    ATB:   Antibiotics  Days   Cefepime IV  1   Unasyn IV (completed)          Skin issues: None  Patient currently has   Urinary catH  Restraints  DVT prophylaxis/ GI prophylaxis,   Palliative care consult   Labs   CBC with Differential:    Lab Results   Component Value Date/Time    WBC 11.1 03/04/2025 03:53 AM    RBC 2.70 03/04/2025 03:53 AM    HGB 8.2 03/04/2025 03:53 AM    HCT 28.0 03/04/2025 03:53 AM    PLT 92 02/26/2025 04:02 AM    .7 03/04/2025 03:53 AM    MCH 30.4 03/04/2025 03:53 AM    MCHC 29.3 03/04/2025 03:53 AM    RDW 22.0 03/04/2025 03:53 AM    NRBC 1 03/01/2025 04:35 AM    METASPCT DUPLICATE 07/18/2023 04:12 AM    LYMPHOPCT 7 03/04/2025 03:53 AM    PROMYELOPCT DUPLICATE 07/18/2023 04:12 AM    MONOPCT 10 03/04/2025 03:53 AM    MYELOPCT 1 02/26/2025 04:02 AM    EOSPCT 2 03/04/2025 03:53 AM    BASOPCT 1 03/04/2025 03:53 AM    MONOSABS 1.05 03/04/2025 03:53 AM    LYMPHSABS 0.78 03/04/2025 03:53 AM    EOSABS 0.18 03/04/2025 03:53 AM    BASOSABS 0.05 03/04/2025 03:53 AM     CMP:    Lab Results   Component Value Date/Time     03/04/2025 03:53 AM    K 3.9 03/04/2025 03:53 AM    K 3.9 09/15/2022 03:08 PM     03/04/2025 03:53 AM    CO2 22 03/04/2025 03:53 AM    BUN 33 03/04/2025 03:53 AM    CREATININE 2.5 03/04/2025 03:53 AM    GFRAA >60 09/15/2022 03:08 PM    LABGLOM 29 03/04/2025 03:53 AM    LABGLOM 31 03/05/2024 05:57 AM    GLUCOSE 106 03/04/2025 03:53 AM    CALCIUM 8.8 03/04/2025 03:53 AM 
Fiber  Schedule: Continuous  Feeding Regimen: 45 ml/hr, running at goal  Water Flushes: 300 ml q 4 hrs = 1800 ml water  Current TF Provides: 1080 ml tv, 1620 kcals, 69 gm pro, 820 ml free water, 2620 ml total fluids  Additional Calorie Sources:  none    Anthropometric Measures:  Height: 177.8 cm (5' 10\")  Ideal Body Weight (IBW): 166 lbs (75 kg)    Admission Body Weight: 108 kg (238 lb) (2/21 bed scale)  Current Body Weight: 108 kg (238 lb) (2/21 admit wt as CBW appears elevated), 143.4 % IBW.    Current BMI (kg/m2): 34.1  Usual Body Weight:  (206-257# measured wt range x past year per EMR)                          BMI Categories: Obese Class 1 (BMI 30.0-34.9)    Estimated Daily Nutrient Needs:  Energy Requirements Based On: Formula  Weight Used for Energy Requirements: Admission  Energy (kcal/day): MSJ x 1.2 SF = 1743-2943  Weight Used for Protein Requirements: Ideal  Protein (g/day):  (1.3-1.5 gm/kg IBW)  Method Used for Fluid Requirements: Defer to provider  Fluid (ml/day): per critical care    Nutrition Diagnosis:   Inadequate oral intake related to cognitive or neurological impairment as evidenced by NPO or clear liquid status due to medical condition, nutrition support - enteral nutrition    Nutrition Interventions:     Nutrition Education/Counseling: Education/Counseling not appropriate  Coordination of Nutrition Care: Continue to monitor while inpatient       Goals:  Goals: Tolerate nutrition support at goal rate, by next RD assessment  Type of Goal: New goal  Previous Goal Met: New Goal    Nutrition Monitoring and Evaluation:      Food/Nutrient Intake Outcomes: Enteral Nutrition Intake/Tolerance  Physical Signs/Symptoms Outcomes: Biochemical Data, GI Status, Fluid Status or Edema, Hemodynamic Status, Nutrition Focused Physical Findings, Skin, Weight    Discharge Planning:    Too soon to determine     Henrietta Ferraro, MS, RD, LD  Contact: [phone]    
Infusion Stopped (02/28/25 0447)    sodium chloride Stopped (02/19/25 0133)    sodium chloride      sodium chloride      sodium chloride       Scheduled Meds:   bumetanide  2 mg IntraVENous BID    warfarin placeholder: dosing by pharmacy   Oral RX Placeholder    warfarin  1 mg Oral Daily    LORazepam  2 mg IntraVENous Q6H    sodium chloride (Inhalant)  4 mL Nebulization Q12H    chlorhexidine  15 mL Mouth/Throat BID    Polyvinyl Alcohol-Povidone PF  1 drop Both Eyes Q4H    Or    artificial tears   Both Eyes Q4H    [Held by provider] metoprolol succinate  25 mg Oral Daily    arformoterol tartrate  15 mcg Nebulization BID RT    budesonide  0.5 mg Nebulization BID RT    ipratropium 0.5 mg-albuterol 2.5 mg  1 Dose Inhalation Q4H WA RT    potassium bicarb-citric acid  20 mEq Oral Once    ampicillin-sulbactam  3,000 mg IntraVENous Q6H    sodium chloride flush  5-40 mL IntraVENous 2 times per day    thiamine  100 mg IntraVENous Daily    rifAXIMin  400 mg Oral TID    lactulose  20 g Oral TID    sodium chloride flush  5-40 mL IntraVENous 2 times per day    pantoprazole (PROTONIX) 40 mg in sodium chloride (PF) 0.9 % 10 mL injection  40 mg IntraVENous BID    magnesium oxide  400 mg Oral Daily    sodium chloride flush  5-40 mL IntraVENous 2 times per day     PRN Meds: sodium chloride, sodium chloride, artificial tears, sodium chloride flush, sodium chloride, polyethylene glycol, acetaminophen **OR** acetaminophen, heparin (porcine), heparin (porcine), sodium chloride flush, sodium chloride, sodium chloride, sodium chloride, sodium chloride flush, sodium chloride, prochlorperazine **OR** prochlorperazine    Labs     Recent Labs     02/27/25 0423 02/28/25 0425 03/01/25  0435   WBC 11.4 9.5 8.8   HGB 7.6* 7.0* 6.8*   HCT 25.5* 23.8* 23.1*   .4* 102.1* 102.2*     Recent Labs     02/27/25 0423 02/27/25  1755 02/28/25 0425 02/28/25  1758 03/01/25  0435      < > 143 146 148*   K 4.4   < > 3.9 4.1 3.9      < > 103 
Qday. Per patient, non-adherent with Warfarin for >1 month, noted from previous notes.   INR goal 2.5 - 3.5  Warfarin not given last night?    Plan:  Warfarin 1 mg jeannie Meraz, AngélicaD, BCPS, BCCCP 3/4/2025 11:08 AM   [phone]            
Shortness of breath, coughing, sputum production, hemoptysis, wheezing, orthopnea.  Gastrointestinal:  Nausea, vomiting, diarrhea, heartburn, constipation, abdominal pain, hematemesis, hematochezia, melena, acholic stools  Genito-Urinary:  Dysuria, urgency, frequency, hematuria  Musculoskeletal:  Joint pain, joint stiffness, joint swelling, muscle pain  Neurology:  Headache, focal neurological deficits, weakness, numbness, paresthesia  Derm:  Rashes, ulcers, excoriations, bruising  Extremities:  Decreased ROM, peripheral edema, mottling      OBJECTIVE:    BP (!) 113/54   Pulse 92   Temp 97.6 °F (36.4 °C) (Oral)   Resp 18   Ht 1.778 m (5' 10\")   Wt 108 kg (238 lb 3.2 oz)   SpO2 98%   BMI 34.18 kg/m²     General appearance:  awake, alert, and oriented to person, place, time, and purpose;   HEENT:  Conjunctivae/corneas clear.   Neck: Supple. No jugular venous distention.   Respiratory: symmetrical; clear to auscultation bilaterally; no wheezes; no rhonchi; no rales  Cardiovascular: rhythm regular; rate controlled; no murmurs  Abdomen: Soft, nontender, distended  Bruise present in right flank  Extremities:  peripheral pulses present; no peripheral edema; no ulcers  Bruising in right arm  Musculoskeletal: No clubbing, cyanosis,bilateral lower extremity edema present. Brisk capillary refill.   Skin:  No rashes  on visible skin  Neurologic: awake, alert and following commands     ASSESSMENT and PLAN:  Alcohol withdrawal syndrome  CKD 3B  hyponatremia improving  Hyponatremia  Hypokalemia  Lactic acidosis  Acute on Chronic anemia  Chronic alcoholic  Coagulopathy/supratherapeutic INR  History of heart valve replacement on warfarin  Liver cirrhosis  Multiple falls     Plan  Admit under internal medicine  Patient seems to be going into withdrawal with elevated CIWA  Will initiate phenobarbital taper  Patient's shortness of breath improved currently saturating at room air  Status post 2 unit of packed RBC 
TV  Additives/Modulars: Protein (1 pro mod q d)  Water Flushes: 500ml q 3h= 4000ml  Current TF Provides: 2160kcal, 92gm pro, (2264ml freewater, 118gm pro w/ pro mod), 1094ml freewater, 5094ml total water w/ flushes.  Additional Calorie Sources:  none currently    Anthropometric Measures:  Height: 177.8 cm (5' 10\")  Ideal Body Weight (IBW): 166 lbs (75 kg)    Admission Body Weight: 108 kg (238 lb) (2/21 bed scale)  Current Body Weight: 106.6 kg (235 lb) (actual 2/18 used as CBW remains elevated at this time), 143.4 % IBW. Weight Source: Bed scale  Current BMI (kg/m2): 33.7  Usual Body Weight:  (206-257# measured wt range x past year per EMR)        Weight Adjustment For: No Adjustment                 BMI Categories: Obese Class 1 (BMI 30.0-34.9)    Estimated Daily Nutrient Needs:  Energy Requirements Based On: Formula  Weight Used for Energy Requirements: Admission  Energy (kcal/day): PS3B 2053; 8159-2366  Weight Used for Protein Requirements: Ideal  Protein (g/day): 1.4-1.6gm/kg IBW=  105-120  Method Used for Fluid Requirements: Defer to provider  Fluid (ml/day): per critical care mgmt    Nutrition Diagnosis:   Inadequate oral intake related to cognitive or neurological impairment as evidenced by NPO or clear liquid status due to medical condition, nutrition support - enteral nutrition    Nutrition Interventions:   Food and/or Nutrient Delivery: Continue NPO, Continue Current Tube Feeding  Nutrition Education/Counseling: Education/Counseling not appropriate  Coordination of Nutrition Care: Continue to monitor while inpatient       Goals:  Goals: Tolerate nutrition support at goal rate, by next RD assessment  Type of Goal: Continue current goal  Previous Goal Met: Progressing toward Goal(s)    Nutrition Monitoring and Evaluation:   Behavioral-Environmental Outcomes: None Identified  Food/Nutrient Intake Outcomes: Enteral Nutrition Intake/Tolerance, Progression of Nutrition  Physical Signs/Symptoms Outcomes: 
cirrhosis  Home dose: 1mg PO Qday. Per patient, non-adherent with Warfarin for >1 month, noted from previous notes.   INR goal 2.5 - 3.5    Plan:  Warfarin 1 mg tonight - will likely plan to dose reduce to 0.5 mg daily if INR persistently increases    Holly Meraz, PharmD, BCPS, BCCCP 3/5/2025 8:28 AM   [phone]            
dependency upon caregivers.    SPECIFIC DYSPHAGIA INTERVENTIONS TO INCLUDE:     compensatory swallowing strategies to improve airway protection and swallow function.  Training in positioning for improved integrity of swallow  ongoing mealtime assessment to provide diet modification and compensatory strategy implementation to minimize risk of aspiration associated with PO intake  exercises to target laryngeal elevation   Shaker and/or Chin Tuck Against Resistance (CTAR) to increase UES relaxation diameter and increase anterior laryngeal excursion to reduce pharyngeal residuals and reduce risk of pen/asp   Effortful Swallow therapeutically to target increased oral and base of tongue pressure, increased pharyngeal constrictor contractions, and increased UES relaxation duration to reduce pharyngeal residue  Danielle Maneuver therapeutically to target increased pharyngeal constrictor contractions to reduce pharyngeal residue    Specific instructions for next treatment:  development and training of compensatory swallow strategies to improve airway protection and swallow function, ongoing PO analysis to upgrade diet and evaluate tolerance of current PO recommendation, initiate instruction of therapeutic exercises , and initiate instruction of compensatory strategies  Treatment Goals:    Short Term Goals:  Pt will participate in meal time assessment for 1-2 sessions to provide diet modification and compensatory strategy implementation to safely advance diet as functional ability improves  During trials of solids patient will masticate solids fully and recollect bolus leaving only minimal oral residue post swallow on  90% of opportunities or greater  On follow up MBSS will improve laryngeal closure as evidenced by decreased bolus entry into laryngeal vestibule when compared to prior MBSS     Long Term Goals:   Pt will maintain adequate nutrition/hydration via PO intake of the least restrictive oral diet with implementation of 
diastolic congestive heart failure (HCC)    Normally functioning cardiac pacemaker present    Hepatic cirrhosis (HCC)    Acute on chronic systolic (congestive) heart failure (HCC)    Hypoxia    Congestive heart failure (HCC)    Acute hypoxic respiratory failure (HCC)    ETOH abuse    Hypokalemia    PAF (paroxysmal atrial fibrillation) (HCC)    Goals of care, counseling/discussion    Palliative care encounter       Current Medications:    Current Facility-Administered Medications:     bumetanide (BUMEX) injection 2 mg, 2 mg, IntraVENous, BID, RosieValentín murray MD, 2 mg at 02/28/25 0830    warfarin placeholder: dosing by pharmacy, , Oral, RX Placeholder, Santo Long MD    warfarin (COUMADIN) tablet 1 mg, 1 mg, Oral, Daily, Santo Long MD    LORazepam (ATIVAN) injection 2 mg, 2 mg, IntraVENous, Q6H, Santo Long MD, 2 mg at 02/28/25 1022    midazolam (VERSED) 100mg/100mL in NS infusion, 1-4 mg/hr, IntraVENous, Continuous, Santo Long MD, Last Rate: 4 mL/hr at 02/28/25 0600, 4 mg/hr at 02/28/25 0600    sodium chloride (Inhalant) 3 % nebulizer solution 4 mL, 4 mL, Nebulization, Q12H, Santo Long MD, 4 mL at 02/28/25 0910    chlorhexidine (PERIDEX) 0.12 % solution 15 mL, 15 mL, Mouth/Throat, BID, Celso Barnes MD, 15 mL at 02/28/25 0833    Polyvinyl Alcohol-Povidone PF (REFRESH) 1.4-0.6 % ophthalmic solution 1 drop, 1 drop, Both Eyes, Q4H, 1 drop at 02/28/25 0441 **OR** lubrifresh P.M. (artificial tears) ophthalmic ointment, , Both Eyes, Q4H, Celso Barnes MD, Given at 02/28/25 0833    norepinephrine (LEVOPHED) 16 mg in sodium chloride 0.9 % 250 mL infusion (premix), 1-100 mcg/min, IntraVENous, Continuous, Celso Barnes MD, Stopped at 02/27/25 0812    0.9 % sodium chloride infusion, , IntraVENous, PRN, Taiwo Sullivan MD lubrifresh P.MSandra (artificial tears) ophthalmic ointment, , Both Eyes, PRN, Valentín Velez MD    [Held by provider] metoprolol succinate (TOPROL XL) extended release 
malnutrition    Acute decompensated heart failure (HCC)    Aortic valve disease    Mitral valve disease    CHB (complete heart block) (HCC)    Pure hypercholesterolemia    Chronic obstructive pulmonary disease (HCC)    Loculated pleural effusion    Troponin level elevated    Stage 2 chronic kidney disease    Alcohol use    Breast swelling    Pleural effusion    Hypophosphatemia    Acute congestive heart failure (HCC)    FRANTZ (acute kidney injury)    Acidosis    Hyperglycemia    Hyperlipidemia    Alcoholic liver disease, unspecified    Thrombocytopenia    Delirium    Acute blood loss anemia    Heart failure with mid-range ejection fraction (HFmEF) (HCC)    Acute on chronic combined systolic and diastolic congestive heart failure (HCC)    Normally functioning cardiac pacemaker present    Hepatic cirrhosis (HCC)    Acute on chronic systolic (congestive) heart failure (HCC)    Hypoxia    Congestive heart failure (HCC)    Acute hypoxic respiratory failure (HCC)    ETOH abuse    Hypokalemia    PAF (paroxysmal atrial fibrillation) (HCC)    Goals of care, counseling/discussion    Palliative care encounter       Pertinent Medical History:   Past Medical History:   Diagnosis Date    Alcoholic liver disease     H/O prosthetic aortic valve replacement 09/2020    [facility]  Mitral and pacemaker at this time as well    History of mitral valve replacement 09/2020    at [facility]  Aortic Valve placed at this time as well as pacemaker    Stroke (HCC)     Testicular cancer (HCC)     in remission since 1988        Surgery/Procedures: intubated 2/25, extubated 3/5    Recommended adaptive equipment: TBD     Precautions:  Fall Risk, cognition, telesitter, +alarms    Assessment of current deficits    [x] Functional mobility  [x]ADLs  [x] Strength               [x]Cognition    [x] Functional transfers   [x] IADLs         [x] Safety Awareness   [x]Endurance    [x] Fine Coordination              [x] Balance      [] 
ml       Review of Systems  Could not be obtained      OBJECTIVE:    BP (!) 93/48   Pulse 77   Temp (!) 101.9 °F (38.8 °C) (Esophageal)   Resp 26   Ht 1.778 m (5' 10\")   Wt 118.4 kg (261 lb 0.4 oz)   SpO2 95%   BMI 37.45 kg/m²     General appearance: intubated and sedated  HEENT:  Conjunctivae/corneas clear.   Neck: Supple. No jugular venous distention.   Respiratory: symmetrical; clear to auscultation bilaterally; no wheezes; no rhonchi; no rales  Cardiovascular: rhythm regular; rate controlled; no murmurs  Abdomen: Soft, nontender, nondistended  Extremities:  peripheral pulses present; no peripheral edema; no ulcers  Musculoskeletal: No clubbing, cyanosis, no bilateral lower extremity edema. Brisk capillary refill.   Skin:  No rashes  on visible skin  Neurologic: intubated and sedated    ASSESSMENT and PLAN:    Delirium tremens with alcohol withdrawal syndrome  CKD 3B  hyponatremia improving  Hypokalemia  Lactic acidosis  Liver cirrhosis  Acute on Chronic anemia  Chronic alcoholic  Coagulopathy/supratherapeutic INR  History of heart valve replacement on warfarin  History of AICD  Elevated QTc  Multiple falls     Plan  Patient transferred to intensive care unit 02/21 as patient had worsening mentation,   Intubated and sedated  Manage per ICU  Daily sat/sbt  Daily abg and cxr  On versed  Levophed weaned off  on Unasyn for aspiration pneumonia  Blood culture x 2-NGTD  Status post 4 unit of packed RBC transfusion,   Hb in am<7;t transfuse 4th unit of prbc; monitor HnH   Hypernatremia resolved  S/p egd and biopsy-moderate portal hypertensive gastropathy  Resume tube feed  Continue protonix  Replace electrolyte  Seizure precautions  Continue thiamine  Continue home medications  Patient has not taken Coumadin for a month, INR elevation is likely secondary to coagulopathy with liver cirrhosis,  Heparin drip switched to warfarin   INR check daily; goal INR 2.5-3.5             DVT Prophylaxis [] Lovenox, []  Heparin, 
monitor/adjust dosing as necessary    Latrell Vidal PharmD, BCPS, BCCCP 2/26/2025 1:22 PM  Phone: [phone]      
obtained      OBJECTIVE:    BP 96/62   Pulse 90   Temp 99.9 °F (37.7 °C) (Axillary)   Resp 20   Ht 1.778 m (5' 10\")   Wt 119 kg (262 lb 5.6 oz)   SpO2 94%   BMI 37.64 kg/m²     General appearance: intubated and sedated  HEENT:  Conjunctivae/corneas clear.   Neck: Supple. No jugular venous distention.   Respiratory: symmetrical; clear to auscultation bilaterally; no wheezes; no rhonchi; no rales  Cardiovascular: rhythm regular; rate controlled; no murmurs  Abdomen: Soft, nontender, nondistended  Extremities:  peripheral pulses present; no peripheral edema; no ulcers  Musculoskeletal: No clubbing, cyanosis, no bilateral lower extremity edema. Brisk capillary refill.   Skin:  No rashes  on visible skin  Neurologic: intubated and sedated    ASSESSMENT and PLAN:    Delirium tremens with alcohol withdrawal syndrome  CKD 3B  hyponatremia improving  Hypokalemia  Lactic acidosis  Liver cirrhosis  Acute on Chronic anemia  Chronic alcoholic  Coagulopathy/supratherapeutic INR  History of heart valve replacement on warfarin  History of AICD  Elevated QTc  Multiple falls     Plan  Patient transferred to intensive care unit 02/21 as patient had worsening mentation,   Intubated and sedation weaned off; following commands  On restrain  Manage per ICU  Daily sat/sbt  Daily abg and cxr  S/p Levophed  on cefepime; febrile overnight- pan cultures ordered  Blood culture x 2-NGTD  Status post 4 unit of packed RBC transfusion,   Hb in am<7;t transfuse 4th unit of prbc; monitor HnH   Hypernatremia resolved  S/p egd and biopsy-moderate portal hypertensive gastropathy  Resume tube feed  Continue protonix  Replace electrolyte  Seizure precautions  Continue thiamine  Continue home medications  Patient has not taken Coumadin for a month, INR elevation is likely secondary to coagulopathy with liver cirrhosis,  Heparin drip switched to warfarin   INR check daily; goal INR 2.5-3.5             DVT Prophylaxis [] Lovenox, []  Heparin, [] SCDs, 
pertinent family history.     Social History:   Social History     Tobacco Use    Smoking status: Every Day     Current packs/day: 1.00     Average packs/day: 1 pack/day for 35.0 years (35.0 ttl pk-yrs)     Types: Cigarettes    Smokeless tobacco: Never   Substance Use Topics    Alcohol use: Yes     Comment: 3-4 beers/day.    Drug use: Never        ALLERGIES: No Known Allergies    Home Medications:  Current Facility-Administered Medications   Medication Dose Route Frequency Provider Last Rate Last Admin    potassium chloride (KLOR-CON M) extended release tablet 40 mEq  40 mEq Oral BID Julianne Mathew, APRN - CNP        sodium chloride flush 0.9 % injection 5-40 mL  5-40 mL IntraVENous 2 times per day Len Arguello DO   10 mL at 02/19/25 0830    sodium chloride flush 0.9 % injection 5-40 mL  5-40 mL IntraVENous PRN Len Arguello DO        0.9 % sodium chloride infusion   IntraVENous PRN Len Arguello DO   Stopped at 02/19/25 0133    thiamine tablet 100 mg  100 mg Oral Daily Len Arguello DO   100 mg at 02/18/25 0800    LORazepam (ATIVAN) tablet 1 mg  1 mg Oral Q1H PRN Len Arguello DO   1 mg at 02/19/25 0623    Or    LORazepam (ATIVAN) injection 1 mg  1 mg IntraVENous Q1H PRN Len Arguello DO        Or    LORazepam (ATIVAN) tablet 2 mg  2 mg Oral Q1H PRN Len Arguello DO   2 mg at 02/18/25 2151    Or    LORazepam (ATIVAN) injection 2 mg  2 mg IntraVENous Q1H PRN Len Arguello DO   2 mg at 02/18/25 0923    Or    LORazepam (ATIVAN) tablet 3 mg  3 mg Oral Q1H PRN Len Arguello DO        Or    LORazepam (ATIVAN) injection 3 mg  3 mg IntraVENous Q1H PRN Len Arguello DO        Or    LORazepam (ATIVAN) tablet 4 mg  4 mg Oral Q1H PRN Len Arguello DO        Or    LORazepam (ATIVAN) injection 4 mg  4 mg IntraVENous Q1H PRN Len Arguello DO        0.9 % sodium chloride infusion   IntraVENous PRN Chris Yusuf MD        pantoprazole (PROTONIX) 40 mg in sodium chloride (PF) 
0926    sodium chloride flush 0.9 % injection 5-40 mL, 5-40 mL, IntraVENous, PRN, Caitlyn Sagastume MD    0.9 % sodium chloride infusion, , IntraVENous, PRN, Caitlyn Sagastume MD    polyethylene glycol (GLYCOLAX) packet 17 g, 17 g, Oral, Daily PRN, Caitlyn Sagastume MD    acetaminophen (TYLENOL) tablet 650 mg, 650 mg, Oral, Q6H PRN **OR** acetaminophen (TYLENOL) suppository 650 mg, 650 mg, Rectal, Q6H PRN, Caitlyn Sagastume MD    LORazepam (ATIVAN) tablet 1 mg, 1 mg, Oral, Q1H PRN **OR** LORazepam (ATIVAN) injection 1 mg, 1 mg, IntraVENous, Q1H PRN **OR** LORazepam (ATIVAN) tablet 2 mg, 2 mg, Oral, Q1H PRN **OR** LORazepam (ATIVAN) injection 2 mg, 2 mg, IntraVENous, Q1H PRN, 2 mg at 02/25/25 0418 **OR** [DISCONTINUED] LORazepam (ATIVAN) tablet 3 mg, 3 mg, Oral, Q1H PRN **OR** [DISCONTINUED] LORazepam (ATIVAN) injection 3 mg, 3 mg, IntraVENous, Q1H PRN, 3 mg at 02/23/25 2137 **OR** [DISCONTINUED] LORazepam (ATIVAN) tablet 4 mg, 4 mg, Oral, Q1H PRN **OR** [DISCONTINUED] LORazepam (ATIVAN) injection 4 mg, 4 mg, IntraVENous, Q1H PRN, Caitlyn Sagastume MD, 4 mg at 02/24/25 0112    thiamine (B-1) injection 250 mg, 250 mg, IntraVENous, Daily, Santo Long MD, 250 mg at 02/25/25 0917    [START ON 3/1/2025] thiamine (B-1) injection 100 mg, 100 mg, IntraVENous, Daily, Santo Long MD    heparin (porcine) injection 4,000 Units, 4,000 Units, IntraVENous, PRN, Santo Long MD    heparin (porcine) injection 2,000 Units, 2,000 Units, IntraVENous, PRN, Santo Long MD, 2,000 Units at 02/23/25 0606    heparin 25,000 units in dextrose 5% 250 mL (premix) infusion, 5-30 Units/kg/hr, IntraVENous, Continuous, Santo Long MD, Last Rate: 11.9 mL/hr at 02/25/25 1228, 11 Units/kg/hr at 02/25/25 1228    rifAXIMin (XIFAXAN) tablet 400 mg, 400 mg, Oral, TID, Chris Yusuf MD, 400 mg at 02/25/25 0918    lactulose (CHRONULAC) 10 GM/15ML solution 20 g, 20 g, Oral, TID, Chris Yusuf MD, 20 g at 
Moderate,[]  High       Medications:  REVIEWED DAILY    Infusion Medications    dextrose 100 mL/hr at 02/26/25 0810    midazolam 1 mg/hr (02/26/25 1144)    norepinephrine 5 mcg/min (02/26/25 0647)    sodium chloride      sodium chloride      heparin (PORCINE) Infusion 11.019 Units/kg/hr (02/26/25 0935)    sodium chloride Stopped (02/19/25 0133)    sodium chloride      sodium chloride      sodium chloride       Scheduled Medications    sodium chloride (Inhalant)  4 mL Nebulization Q12H    chlorhexidine  15 mL Mouth/Throat BID    Polyvinyl Alcohol-Povidone PF  1 drop Both Eyes Q4H    Or    artificial tears   Both Eyes Q4H    metoprolol succinate  25 mg Oral Daily    arformoterol tartrate  15 mcg Nebulization BID RT    budesonide  0.5 mg Nebulization BID RT    ipratropium 0.5 mg-albuterol 2.5 mg  1 Dose Inhalation Q4H WA RT    potassium bicarb-citric acid  20 mEq Oral Once    folic acid  1 mg IntraVENous Daily    ampicillin-sulbactam  3,000 mg IntraVENous Q6H    sodium chloride flush  5-40 mL IntraVENous 2 times per day    thiamine  250 mg IntraVENous Daily    [START ON 3/1/2025] thiamine  100 mg IntraVENous Daily    rifAXIMin  400 mg Oral TID    lactulose  20 g Oral TID    sodium chloride flush  5-40 mL IntraVENous 2 times per day    pantoprazole (PROTONIX) 40 mg in sodium chloride (PF) 0.9 % 10 mL injection  40 mg IntraVENous BID    magnesium oxide  400 mg Oral Daily    sodium chloride flush  5-40 mL IntraVENous 2 times per day    warfarin placeholder: dosing by pharmacy   Oral RX Placeholder     PRN Meds: sodium chloride, artificial tears, sodium chloride flush, sodium chloride, polyethylene glycol, acetaminophen **OR** acetaminophen, LORazepam **OR** LORazepam **OR** LORazepam **OR** LORazepam **OR** [DISCONTINUED] LORazepam **OR** [DISCONTINUED] LORazepam **OR** [DISCONTINUED] LORazepam **OR** [DISCONTINUED] LORazepam, heparin (porcine), heparin (porcine), sodium chloride flush, sodium chloride, sodium chloride, 
Prophylaxis [] Lovenox, []  Heparin, [] SCDs, [] Ambulation   GI Prophylaxis [] PPI,  [] H2 Blocker,  [] Carafate,  [] Diet/Tube Feeds   Disposition Patient requires continued admission due to pt is intubated and sedated   MDM [] Low, [] Moderate,[]  High       Medications:  REVIEWED DAILY    Infusion Medications    dextrose 100 mL/hr at 03/06/25 0934    dexmedeTOMIDine (PRECEDEX) 1,000 mcg in sodium chloride 0.9 % 250 mL infusion Stopped (03/04/25 1119)    heparin (PORCINE) Infusion 13 Units/kg/hr (03/06/25 0642)    sodium chloride       Scheduled Medications    potassium chloride  10 mEq IntraVENous Q2H    bumetanide  2 mg IntraVENous BID    pantoprazole (PROTONIX) 40 mg in sodium chloride (PF) 0.9 % 10 mL injection  40 mg IntraVENous Daily    lactulose  20 g Per NG tube TID    magnesium oxide  400 mg Per NG tube Daily    rifAXIMin  400 mg Per NG tube TID    warfarin  1 mg Per NG tube Daily    cefepime  2,000 mg IntraVENous Q12H    warfarin placeholder: dosing by pharmacy   Oral RX Placeholder    [Held by provider] metoprolol succinate  25 mg Oral Daily    arformoterol tartrate  15 mcg Nebulization BID RT    budesonide  0.5 mg Nebulization BID RT    ipratropium 0.5 mg-albuterol 2.5 mg  1 Dose Inhalation Q4H WA RT    sodium chloride flush  5-40 mL IntraVENous 2 times per day    thiamine  100 mg IntraVENous Daily     PRN Meds: acetaminophen **OR** acetaminophen, polyethylene glycol, artificial tears, heparin (porcine), heparin (porcine), sodium chloride flush, sodium chloride    Labs:     Recent Labs     03/04/25 0353 03/05/25 0408 03/06/25  0430   WBC 11.1 8.6 8.0   HGB 8.2* 7.8* 8.0*   HCT 28.0* 26.6* 27.6*       Recent Labs     03/04/25 0353 03/05/25 0408 03/06/25  0430   * 157* 156*   K 3.9 3.3* 3.6   * 122* 122*   CO2 22 22 20*   BUN 33* 31* 29*   CREATININE 2.5* 2.5* 2.2*   CALCIUM 8.8 8.7 8.7   PHOS 3.6 3.2 3.3       Recent Labs     03/04/25  0353 03/05/25  0408 03/06/25  0430   ALKPHOS 219* 
able  Nephrology following, sodium improving to 148 today, IV fluid, IV Bumex.  Diuresis.  Nephrology  Continue dysphagia diet, speech recommending soft/bite-size with nectar thick liquids.      Patient will go to [facility] on discharge.  No pre-CERT needed    DVT Prophylaxis [] Lovenox, []  Heparin, [] SCDs, [] Ambulation   GI Prophylaxis [] PPI,  [] H2 Blocker,  [] Carafate,  [] Diet/Tube Feeds   Disposition Patient requires continued admission due to bridging with heparin drip, weaning oxygen   MDM [] Low, [] Moderate,[x]  High       Medications:  REVIEWED DAILY    Infusion Medications    dextrose 75 mL/hr at 03/08/25 1111    sodium chloride      heparin (PORCINE) Infusion 17 Units/kg/hr (03/09/25 0822)    sodium chloride 50 mL/hr at 03/07/25 0347     Scheduled Medications    warfarin  2 mg Oral Once    sodium chloride flush  5-40 mL IntraVENous 2 times per day    lidocaine 1 % injection  50 mg IntraDERmal Once    folic acid  1 mg IntraVENous Daily    multivitamin  1 tablet Oral Daily    cefepime  2,000 mg IntraVENous Q12H    bumetanide  2 mg IntraVENous BID    pantoprazole (PROTONIX) 40 mg in sodium chloride (PF) 0.9 % 10 mL injection  40 mg IntraVENous Daily    lactulose  20 g Per NG tube TID    magnesium oxide  400 mg Per NG tube Daily    rifAXIMin  400 mg Per NG tube TID    warfarin placeholder: dosing by pharmacy   Oral RX Placeholder    [Held by provider] metoprolol succinate  25 mg Oral Daily    arformoterol tartrate  15 mcg Nebulization BID RT    budesonide  0.5 mg Nebulization BID RT    sodium chloride flush  5-40 mL IntraVENous 2 times per day    thiamine  100 mg IntraVENous Daily     PRN Meds: LORazepam, ipratropium 0.5 mg-albuterol 2.5 mg, sodium chloride flush, sodium chloride, acetaminophen **OR** acetaminophen, polyethylene glycol, artificial tears, heparin (porcine), heparin (porcine), sodium chloride flush, sodium chloride    Labs:     Recent Labs     03/07/25  2230 03/08/25  8007 
agitation and alcohol withdrawal and was subsequently intubated on 2/25/2025.  Patient was extubated on 3/5/2025 and transferred to the floor on 3/6/2025.  Patient has been hyponatremic throughout this entire admission and was managed by ICU, sodium 153, reason for this consultation.    Problems resolved:    Hypernatremia 2/2 dehydration, sodium 144, discontinue IV fluids, resolved  Hypomagnesemia 2/2 diuretics, to replace  Hypophosphatemia 2/2 poor oral intake, to replace     IMPRESSION/RECOMMENDATIONS:      CKD stage IIIa, probably due to nephrosclerosis, baseline creatinine 2.1-2.3 mg/dL    Hypokalemia 2/2 diuretics, to replace  HFpEF 50%, proBNP 2316, on metoprolol, hydralazine  HTN, presently hypotensive  Normocytic anemia, with iron deficiency ferritin 18, iron saturation 5%, B12 1778, folate 9.2, on IV iron  --------------------------------------  Cirrhosis, 2/2 Alcohol abuse, GI following   Hepatic encephalopathy, lactulose, rifaximin, GI following   CAD status post CABG, AVR and MVR mechanical valves  HLD, on atorvastatin   History of testicular cancer  History of CVA    Plan:    Continue Bumex 2 mg PO daily  Continue to hold metoprolol  Replace potassium  Obtain ABGs  Continue to monitor renal function  Continue to monitor blood pressure        Electronically signed by ARNALDO Evans CNP on 3/11/2025 at 9:00 AM       I saw and evaluated the patient, performing the key elements of the service. I discussed the findings, assessment and plan with NP and agree with her findings and plans as documented in her note.      Omar Joiner MD      
ascites:    4 quadrant survey demonstrated no evidence of abdominal ascites.    Impression  1. Suboptimal visualization of the liver. Left lobe poorly visualized due to  body habitus and patient positioning.  2. Mildly heterogeneous liver echogenicity.  3. Cholelithiasis.  4. Right renal cyst.  5. Echogenic regions in the pyramids of the right kidney, consider  nephrocalcinosis.  6. No evidence of abdominal ascites.  7. Main portal vein velocity slightly elevated at 48 cm/second.  Flow is  hepatopetal.     CT Result (most recent):  CT ABDOMEN PELVIS W IV CONTRAST 02/17/2025    Narrative  EXAMINATION:  CTA OF THE CHEST; CT OF THE ABDOMEN AND PELVIS WITH CONTRAST 2/17/2025 4:44 pm    TECHNIQUE:  CTA of the chest was performed after the administration of intravenous  contrast.  Multiplanar reformatted images are provided for review.  MIP  images are provided for review. Automated exposure control, iterative  reconstruction, and/or weight based adjustment of the mA/kV was utilized to  reduce the radiation dose to as low as reasonably achievable.; CT of the  abdomen and pelvis was performed with the administration of intravenous  contrast. Multiplanar reformatted images are provided for review. Automated  exposure control, iterative reconstruction, and/or weight based adjustment of  the mA/kV was utilized to reduce the radiation dose to as low as reasonably  achievable.    COMPARISON:  None.    HISTORY:  ORDERING SYSTEM PROVIDED HISTORY: concern for PE/ trauma  TECHNOLOGIST PROVIDED HISTORY:  Reason for exam:->concern for PE/ trauma  Additional Contrast?->1  What reading provider will be dictating this exam?->CRC; ORDERING SYSTEM  PROVIDED HISTORY: trauma  TECHNOLOGIST PROVIDED HISTORY:  Reason for exam:->trauma  Additional Contrast?->None  Decision Support Exception - unselect if not a suspected or confirmed  emergency medical condition->Emergency Medical Condition (MA)  What reading provider will be dictating this 
of breath.  Patient was transferred to the ICU on 2/21/2025 for increased agitation and alcohol withdrawal and was subsequently intubated on 2/25/2025.  Patient was extubated on 3/5/2025 and transferred to the floor on 3/6/2025.  Patient has been hyponatremic throughout this entire admission and was managed by ICU, sodium 153, reason for this consultation.    Problems resolved:    Hypernatremia 2/2 dehydration, sodium 144, discontinue IV fluids, resolved  Hypomagnesemia 2/2 diuretics, to replace  Hypophosphatemia 2/2 poor oral intake, to replace     IMPRESSION/RECOMMENDATIONS:      CKD stage IIIa, probably due to nephrosclerosis, baseline creatinine 2.1-2.3 mg/dL, renal function stable    Hypokalemia 2/2 diuretics, to replace  HFpEF 50%, proBNP 2316, on metoprolol, hydralazine  HTN, presently hypotensive  Normocytic anemia, with iron deficiency ferritin 18, iron saturation 5%, B12 1778, folate 9.2, on IV iron  --------------------------------------  Cirrhosis, 2/2 Alcohol abuse, GI following   Hepatic encephalopathy, lactulose, rifaximin, GI following   CAD status post CABG, AVR and MVR mechanical valves  HLD, on atorvastatin   History of testicular cancer  History of CVA    Plan:    Continue Bumex 2 mg PO daily  Continue to hold metoprolol  Replace potassium  Continue to monitor renal function  Continue to monitor blood pressure  We will follow peripherally         Electronically signed by ARNALDO Evans CNP on 3/12/2025 at 12:06 PM       I saw and evaluated the patient, performing the key elements of the service. I discussed the findings, assessment and plan with NP and agree with her findings and plans as documented in her note.    Omar Joiner MD    
present.   
pulm following.   Nephrology following, sodium improved, off IVF, oral diuresis.   Continue dysphagia diet, speech recommending soft/bite-size with nectar thick liquids.  ABG pending      Patient will go to [facility] on discharge.  No pre-CERT needed    DVT Prophylaxis [] Lovenox, []  Heparin, [] SCDs, [] Ambulation   GI Prophylaxis [] PPI,  [] H2 Blocker,  [] Carafate,  [] Diet/Tube Feeds   Disposition Patient requires continued admission due to bridging with heparin drip   MDM [] Low, [] Moderate,[x]  High       Medications:  REVIEWED DAILY    Infusion Medications    sodium chloride      heparin (PORCINE) Infusion 13 Units/kg/hr (03/11/25 1218)    sodium chloride 50 mL/hr at 03/07/25 0341     Scheduled Medications    warfarin  3 mg Oral Once    bumetanide  2 mg Oral Daily    thiamine  100 mg Oral Daily    sodium chloride flush  5-40 mL IntraVENous 2 times per day    lidocaine 1 % injection  50 mg IntraDERmal Once    folic acid  1 mg IntraVENous Daily    multivitamin  1 tablet Oral Daily    pantoprazole (PROTONIX) 40 mg in sodium chloride (PF) 0.9 % 10 mL injection  40 mg IntraVENous Daily    lactulose  20 g Per NG tube TID    magnesium oxide  400 mg Per NG tube Daily    rifAXIMin  400 mg Per NG tube TID    warfarin placeholder: dosing by pharmacy   Oral RX Placeholder    [Held by provider] metoprolol succinate  25 mg Oral Daily    arformoterol tartrate  15 mcg Nebulization BID RT    budesonide  0.5 mg Nebulization BID RT    sodium chloride flush  5-40 mL IntraVENous 2 times per day     PRN Meds: LORazepam, ipratropium 0.5 mg-albuterol 2.5 mg, sodium chloride flush, sodium chloride, acetaminophen **OR** acetaminophen, polyethylene glycol, artificial tears, heparin (porcine), heparin (porcine), sodium chloride flush, sodium chloride    Labs:     Recent Labs     03/09/25  0529 03/10/25  0420 03/11/25  0415   WBC 9.1 11.7* 10.8   HGB 9.1* 9.2* 9.2*   HCT 30.6* 30.1* 29.9*       Recent Labs     03/09/25 0534 
reviewed: labs, images, records, medication use, vitals, and chart    Time/Communication  Greater than 50% of time spent, total 50 minutes in counseling and coordination of care at the bedside regarding goals of care.    Thank you for allowing Palliative Medicine to participate in the care of Len Dorsey.    Note: This report was completed using computerSportfort voiced recognition software.  Every effort has been made to ensure accuracy; however, inadvertent computerized transcription errors may be present.   
testicular cancer status postchemotherapy, alcohol abuse, fatty liver, who was admitted on who was admitted on 2/17/2025 for shortness of breath.  Patient was transferred to the ICU on 2/21/2025 for increased agitation and alcohol withdrawal and was subsequently intubated on 2/25/2025.  Patient was extubated on 3/5/2025 and transferred to the floor on 3/6/2025.  Patient has been hyponatremic throughout this entire admission and was managed by ICU, sodium 153, reason for this consultation.    IMPRESSION/RECOMMENDATIONS:      Hypernatremia 2/2 dehydration, sodium 144, discontinue IV fluids, resolved    CKD stage IIIa, probably due to nephrosclerosis, baseline creatinine 2.1-2.3 mg/dL    HFpEF 50%, proBNP 2316, on metoprolol, hydralazine  HTN, presently hypotensive  Normocytic anemia, with iron deficiency ferritin 18, iron saturation 5%, B12 1778, folate 9.2, on IV iron  Hypomagnesemia 2/2 diuretics, to replace  Hypophosphatemia 2/2 poor oral intake, to replace   --------------------------------------  Cirrhosis, 2/2 Alcohol abuse, GI following   Hepatic encephalopathy, lactulose, rifaximin, GI following   CAD status post CABG, AVR and MVR mechanical valves  HLD, on atorvastatin   History of testicular cancer  History of CVA    Plan:  Discontinue IV fluids  Change Bumex to 2 mg PO daily  Replace potassium  Replace magnesium  Replace phosphorus  Continue to monitor renal function  Continue to monitor blood pressure        Electronically signed by ARNALDO Evans CNP on 3/10/2025 at 9:01 AM       I saw and evaluated the patient, performing the key elements of the service. I discussed the findings, assessment and plan with NP and agree with her findings and plans as documented in her note.        Omar Joiner MD    
  WBC 11.4 9.5 8.8  --    HGB 7.6* 7.0* 6.8* 7.7*   HCT 25.5* 23.8* 23.1* 26.3*       Recent Labs     02/27/25 0423 02/27/25 1755 02/28/25 0425 02/28/25 1758 03/01/25 0435      < > 143 146 148*   K 4.4   < > 3.9 4.1 3.9      < > 103 106 107   CO2 26   < > 28 27 26   BUN 34*   < > 33* 32* 31*   CREATININE 2.5*   < > 2.6* 2.6* 2.7*   CALCIUM 8.0*   < > 8.2* 8.2* 8.6   PHOS 3.5  --  3.5  --  3.8    < > = values in this interval not displayed.       Recent Labs     02/27/25 0423 02/28/25 0425 03/01/25 0435   ALKPHOS 164* 171* 174*   ALT 17 13 14   AST 41* 30 36   BILITOT 2.0* 1.8* 2.0*       Recent Labs     02/27/25 0423 02/28/25 0425 03/01/25 0435   INR 1.3 1.4 1.3       No results for input(s): \"CKTOTAL\", \"TROPONINI\" in the last 72 hours.    Chronic labs:    Lab Results   Component Value Date    CHOL 112 02/18/2025    TRIG 138 02/18/2025    HDL 57 02/18/2025    TSH 1.69 02/18/2025    INR 1.3 03/01/2025    LABA1C 4.9 05/08/2024       Radiology: REVIEWED DAILY    +++++++++++++++++++++++++++++++++++++++++++++++++  Laila Galindo MD  [facility] Hospitalist  [facility], [place]  +++++++++++++++++++++++++++++++++++++++++++++++++  NOTE: This report was transcribed using voice recognition software. Every effort was made to ensure accuracy; however, inadvertent computerized transcription errors may be present.      
**OR** [DISCONTINUED] LORazepam **OR** [DISCONTINUED] LORazepam **OR** [DISCONTINUED] LORazepam, sodium chloride, sodium chloride, sodium chloride flush, sodium chloride, potassium chloride **OR** potassium alternative oral replacement **OR** potassium chloride, magnesium sulfate, polyethylene glycol, prochlorperazine **OR** prochlorperazine    Labs:     Recent Labs     02/17/25  1638 02/18/25  0600 02/18/25  1840 02/19/25  0232 02/19/25  0445 02/20/25  0615   WBC 11.0 11.1  --   --  10.8 9.3   HGB 7.0* 6.0*   < > 7.0* 7.3* 7.4*   HCT 22.3* 19.1*   < > 22.6* 23.8* 24.0*   * 58*  --   --  73*  --     < > = values in this interval not displayed.       Recent Labs     02/19/25  0445 02/19/25  0934 02/20/25  0615    133 138   K 3.0* 3.4* 3.3*   CL 89* 90* 94*   CO2 30* 33* 29   BUN 42* 42* 42*   CREATININE 2.3* 2.4* 2.4*   CALCIUM 8.3* 8.3* 9.0       Recent Labs     02/17/25  1638 02/18/25  0600 02/20/25  1052   ALKPHOS 211* 189* 193*   ALT 26 20 20   AST 59* 39 34   BILITOT 2.1* 2.2* 3.2*   LIPASE 40  --   --        Recent Labs     02/18/25  1417 02/19/25  1019 02/20/25  0515   INR 4.6 3.4 2.8       No results for input(s): \"CKTOTAL\", \"TROPONINI\" in the last 72 hours.    Chronic labs:    Lab Results   Component Value Date    CHOL 112 02/18/2025    TRIG 138 02/18/2025    HDL 57 02/18/2025    TSH 1.69 02/18/2025    INR 2.8 02/20/2025    LABA1C 4.9 05/08/2024       Radiology: REVIEWED DAILY    +++++++++++++++++++++++++++++++++++++++++++++++++  Chris Yusuf MD   Hospitalist  [place], [place]  +++++++++++++++++++++++++++++++++++++++++++++++++  NOTE: This report was transcribed using voice recognition software. Every effort was made to ensure accuracy; however, inadvertent computerized transcription errors may be present.       
02/25/25  0402 02/25/25  1812 02/26/25  0402 02/26/25  1738 02/27/25  0423   *   < > 153* 147* 146   K 4.2   < > 3.9 4.0 4.4   *   < > 111* 108* 107   CO2 31*   < > 29 31* 26   BUN 32*   < > 36* 34* 34*   CREATININE 2.3*   < > 2.4* 2.6* 2.5*   CALCIUM 8.9   < > 8.6 8.1* 8.0*   PHOS 3.4  --  1.6*  --  3.5    < > = values in this interval not displayed.       Recent Labs     02/25/25  0402 02/26/25  0402 02/27/25 0423   ALKPHOS 191* 177* 164*   ALT 23 18 17   AST 41* 30 41*   BILITOT 2.2* 1.8* 2.0*       Recent Labs     02/25/25  0402 02/26/25  0402 02/27/25 0423   INR 1.5 1.4 1.3       No results for input(s): \"CKTOTAL\", \"TROPONINI\" in the last 72 hours.    Chronic labs:    Lab Results   Component Value Date    CHOL 112 02/18/2025    TRIG 138 02/18/2025    HDL 57 02/18/2025    TSH 1.69 02/18/2025    INR 1.3 02/27/2025    LABA1C 4.9 05/08/2024       Radiology: REVIEWED DAILY    +++++++++++++++++++++++++++++++++++++++++++++++++  Laila Galindo MD  [facility] [place]  [place], [place]  +++++++++++++++++++++++++++++++++++++++++++++++++  NOTE: This report was transcribed using voice recognition software. Every effort was made to ensure accuracy; however, inadvertent computerized transcription errors may be present.      
03/08/25  0659 03/09/25  0529 03/10/25  0420   WBC 7.9 9.1 11.7*   HGB 9.0* 9.1* 9.2*   HCT 32.3* 30.6* 30.1*       Recent Labs     03/08/25  0659 03/09/25  0529 03/10/25  0420   * 147* 144   K 4.5 3.8 3.4*   * 110* 104   CO2 17* 20* 24   BUN 28* 31* 30*   CREATININE 2.2* 2.3* 2.2*   CALCIUM 8.8 9.1 9.2   PHOS 2.7 2.7 1.7*       Recent Labs     03/08/25  0659 03/09/25  0529 03/10/25  0420   ALKPHOS 225* 250* 251*   ALT 30 33 30   AST 59* 64* 48*   BILITOT 2.4* 2.2* 2.0*       Recent Labs     03/08/25  0659 03/09/25  0630 03/10/25  0420   INR 1.7 1.6 1.6       No results for input(s): \"CKTOTAL\", \"TROPONINI\" in the last 72 hours.    Chronic labs:    Lab Results   Component Value Date    CHOL 112 02/18/2025    TRIG 138 02/18/2025    HDL 57 02/18/2025    TSH 1.69 02/18/2025    INR 1.6 03/10/2025    LABA1C 4.9 05/08/2024       Radiology: REVIEWED DAILY      Total time 35 minutes spent reviewing chart notes, reviewing treatment plans and prognosis with the patient/caregiver, and documenting in the chart.    +++++++++++++++++++++++++++++++++++++++++++++++++  Ramila Chase MD  [facility]- [place], [place]  +++++++++++++++++++++++++++++++++++++++++++++++++  NOTE: This report was transcribed using voice recognition software. Every effort was made to ensure accuracy; however, inadvertent computerized transcription errors may be present.      
10.7*   HCT 31.0* 31.0* 35.1*    139  --        Recent Labs     03/12/25  0645 03/13/25  1202 03/14/25  0458    141 141   K 3.5 3.7 3.8    101 101   CO2 23 23 20*   BUN 33* 32* 34*   CREATININE 2.1* 1.9* 2.0*   CALCIUM 9.1 9.6 9.8   PHOS 2.8 3.2 4.2       Recent Labs     03/12/25  0645 03/13/25  1202 03/14/25  0458   ALKPHOS 237* 250* 264*   ALT 28 34 35   AST 57* 70* 74*   BILITOT 1.7* 1.8* 1.7*       Recent Labs     03/12/25  0645 03/13/25  1202 03/14/25  0458   INR 1.8 1.7 1.7       No results for input(s): \"CKTOTAL\", \"TROPONINI\" in the last 72 hours.    Chronic labs:    Lab Results   Component Value Date    CHOL 112 02/18/2025    TRIG 138 02/18/2025    HDL 57 02/18/2025    TSH 1.69 02/18/2025    INR 1.7 03/14/2025    LABA1C 4.9 05/08/2024       Radiology: REVIEWED DAILY      Total time 35 minutes spent reviewing chart notes, reviewing treatment plans and prognosis with the patient/caregiver, and documenting in the chart.    +++++++++++++++++++++++++++++++++++++++++++++++++  Ramila Chase MD  [facility]- [place], [place]  +++++++++++++++++++++++++++++++++++++++++++++++++  NOTE: This report was transcribed using voice recognition software. Every effort was made to ensure accuracy; however, inadvertent computerized transcription errors may be present.      
1100    Total Treatment Time  10 minutes     Evaluation Time includes thorough review of current medical information, gathering information on past medical history/social history and prior level of function, completion of standardized testing/informal observation of tasks, assessment of data and education on plan of care and goals.    CPT codes:  [x] Low Complexity PT evaluation 18150  [] Moderate Complexity PT evaluation 41711  [] High Complexity PT evaluation 85590  [] PT Re-evaluation 62080  [] Gait training 01691 0 minutes  [] Manual therapy 43387 0 minutes  [x] Therapeutic activities 02991 10 minutes  [] Therapeutic exercises 99974 0 minutes  [] Neuromuscular reeducation 68494 0 minutes     Daniel Harvey, PT, DPT  [id number]     
2.2* 2.1* 2.0* 2.1*   CALCIUM 8.7 8.7 8.8 8.5* 9.0   PHOS 3.2 3.3  --   --  2.2*       Recent Labs     03/05/25  0408 03/06/25  0430 03/07/25  0448   ALKPHOS 191* 208* 220*   ALT 23 32 30   AST 62* 68* 57*   BILITOT 1.7* 1.8* 2.2*       Recent Labs     03/05/25  0408 03/06/25  1150 03/07/25  0448   INR 1.7 1.6 1.9       No results for input(s): \"CKTOTAL\", \"TROPONINI\" in the last 72 hours.    Chronic labs:    Lab Results   Component Value Date    CHOL 112 02/18/2025    TRIG 138 02/18/2025    HDL 57 02/18/2025    TSH 1.69 02/18/2025    INR 1.9 03/07/2025    LABA1C 4.9 05/08/2024       Radiology: REVIEWED DAILY    +++++++++++++++++++++++++++++++++++++++++++++++++  Magen Shaffer MD  [facility] [place] [place], [place]  +++++++++++++++++++++++++++++++++++++++++++++++++  NOTE: This report was transcribed using voice recognition software. Every effort was made to ensure accuracy; however, inadvertent computerized transcription errors may be present.      
30.1* 29.9* 31.0*   PLT  --   --  140       Recent Labs     03/10/25  0420 03/11/25  0415 03/12/25  0645    143 140   K 3.4* 3.4* 3.5    105 100   CO2 24 23 23   BUN 30* 32* 33*   CREATININE 2.2* 2.2* 2.1*   CALCIUM 9.2 8.9 9.1   PHOS 1.7* 2.8 2.8       Recent Labs     03/10/25  0420 03/11/25  0415 03/12/25  0645   ALKPHOS 251* 229* 237*   ALT 30 27 28   AST 48* 51* 57*   BILITOT 2.0* 1.7* 1.7*       Recent Labs     03/11/25 0415 03/11/25 2012 03/12/25  0645   INR 1.6 1.8 1.8       No results for input(s): \"CKTOTAL\", \"TROPONINI\" in the last 72 hours.    Chronic labs:    Lab Results   Component Value Date    CHOL 112 02/18/2025    TRIG 138 02/18/2025    HDL 57 02/18/2025    TSH 1.69 02/18/2025    INR 1.8 03/12/2025    LABA1C 4.9 05/08/2024       Radiology: REVIEWED DAILY      Total time 35 minutes spent reviewing chart notes, reviewing treatment plans and prognosis with the patient/caregiver, and documenting in the chart.    +++++++++++++++++++++++++++++++++++++++++++++++++  aRmila Chase MD  [facility]- Hospitalist  [place], [place]  +++++++++++++++++++++++++++++++++++++++++++++++++  NOTE: This report was transcribed using voice recognition software. Every effort was made to ensure accuracy; however, inadvertent computerized transcription errors may be present.      
sodium chloride flush, sodium chloride, sodium chloride, sodium chloride, sodium chloride flush, sodium chloride, prochlorperazine **OR** prochlorperazine    Labs:     Recent Labs     03/01/25 0435 03/01/25  1016 03/02/25  0349 03/03/25  0536   WBC 8.8  --  8.3 8.5   HGB 6.8* 7.7* 7.4* 8.1*   HCT 23.1* 26.3* 25.1* 28.5*       Recent Labs     03/01/25 0435 03/02/25  0349 03/03/25  0536   * 148* 150*   K 3.9 3.5 4.1    110* 113*   CO2 26 24 26   BUN 31* 31* 33*   CREATININE 2.7* 2.7* 2.6*   CALCIUM 8.6 8.5* 8.7   PHOS 3.8 4.2 3.9       Recent Labs     03/01/25 0435 03/02/25  0349 03/03/25  0536   ALKPHOS 174* 190* 206*   ALT 14 15 17   AST 36 41* 48*   BILITOT 2.0* 1.9* 1.7*       Recent Labs     03/01/25 0435 03/02/25  0349 03/03/25  0536   INR 1.3 1.3 1.4       No results for input(s): \"CKTOTAL\", \"TROPONINI\" in the last 72 hours.    Chronic labs:    Lab Results   Component Value Date    CHOL 112 02/18/2025    TRIG 138 02/18/2025    HDL 57 02/18/2025    TSH 1.69 02/18/2025    INR 1.4 03/03/2025    LABA1C 4.9 05/08/2024       Radiology: REVIEWED DAILY    +++++++++++++++++++++++++++++++++++++++++++++++++  Laila Galindo MD  [facility]  [facility] [place], [place]  +++++++++++++++++++++++++++++++++++++++++++++++++  NOTE: This report was transcribed using voice recognition software. Every effort was made to ensure accuracy; however, inadvertent computerized transcription errors may be present.      
patient, face-to-face patient care, completing clinical documentation, obtaining and/or reviewing separately obtained history, performing a medically appropriate examination, counseling and educating the patient/family/caregiver, and ordering medications, tests, or procedures.       
steatosis with cirrhotic liver fall G without discrete lesion.  4. Distended gallbladder with cholelithiasis without evidence of  cholecystitis by this technique.  5. Subcutaneous edema is seen across the lower abdomen and pelvis that could  relate to contusion versus edema.  Recommend clinical correlation.     Previous Endoscopies: Date of last Colonoscopy: 8/24/2022  No flexible sigmoidoscopy on file    Home Medications:  Current Facility-Administered Medications   Medication Dose Route Frequency Provider Last Rate Last Admin    hydrOXYzine (VISTARIL) injection 50 mg  50 mg IntraMUSCular Once Michelle Sanches APRN - CNP        bumetanide (BUMEX) tablet 2 mg  2 mg Oral Daily Blessing Adler APRN - CNP   2 mg at 03/12/25 1010    thiamine tablet 100 mg  100 mg Oral Daily Ramila Chase MD   100 mg at 03/12/25 1011    LORazepam (ATIVAN) injection 1 mg  1 mg IntraVENous Q6H PRN Ramila Chase MD   1 mg at 03/12/25 2100    ipratropium 0.5 mg-albuterol 2.5 mg (DUONEB) nebulizer solution 1 Dose  1 Dose Inhalation Q4H PRN Caitlyn Sagastume MD        sodium chloride flush 0.9 % injection 5-40 mL  5-40 mL IntraVENous 2 times per day Magen Shaffer MD   10 mL at 03/12/25 2040    sodium chloride flush 0.9 % injection 5-40 mL  5-40 mL IntraVENous PRN Magen Shaffer MD        0.9 % sodium chloride infusion   IntraVENous PRN Magen Shaffer MD        lidocaine PF 1 % injection 50 mg  50 mg IntraDERmal Once Magen Shaffer MD        folic acid injection 1 mg  1 mg IntraVENous Daily Santo Long MD   1 mg at 03/12/25 1007    multivitamin 1 tablet  1 tablet Oral Daily Santo Long MD   1 tablet at 03/12/25 1011    pantoprazole (PROTONIX) 40 mg in sodium chloride (PF) 0.9 % 10 mL injection  40 mg IntraVENous Daily Santo Long MD   40 mg at 03/12/25 1009    lactulose (CHRONULAC) 10 GM/15ML solution 20 g  20 g Per NG tube TID Nikhil Rosa DO   20 g at 03/12/25 1013    magnesium oxide (MAG-OX) tablet 
  Component Value Date    VLDL 28 2025    VLDL 14 2024    VLDL 29 2023     No results found for: \"CHOLHDLRATIO\"  No results for input(s): \"PROBNP\" in the last 72 hours.      Cardiac Tests:    EK2025: Atrial sensed V paced 73 bpm    Telemetry: Ventricular pacing 90s    Chest X-ray:   Impression:        Pattern of a perihilar vascular congestion with perihilar interstitial edema  of mild-to-moderate degree.  Increased since recent examinations.       Echocardiogram:   TTE 25 RS    Left Ventricle: Low normal left ventricular systolic function with a visually estimated EF of 50%. Left ventricle size is normal. Normal wall thickness. Normal wall motion. Indeterminate diastolic function.  Abnormal septal motion present.    Right Ventricle: Right ventricle size is normal. Moderately reduced systolic function.    Aortic Valve: Bioprosthetic valve that is well-seated. AV mean gradient is 16 mmHg.    Mitral Valve: Mechanical valve that is well-seated.    Tricuspid Valve: Mild regurgitation.    Left Atrium: Left atrium is severely dilated.    Right Atrium: Right atrium is moderately dilated.    Image quality is suboptimal. Contrast used: Lumason. Technically difficult study.    Stress test:      Cardiac catheterization:   [facility] 3/2023  CONCLUSIONS:  1. Single vessel disease.  2. Patent LIMA to LAD.       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 
0.5 mg-albuterol 2.5 mg (DUONEB) nebulizer solution 1 Dose  1 Dose Inhalation Q4H WA RT Caitlyn Sagastume MD   1 Dose at 02/27/25 0857    potassium bicarb-citric acid (EFFER-K) effervescent tablet 20 mEq  20 mEq Oral Once Caitlyn Sagastume MD        ampicillin-sulbactam (UNASYN) 3,000 mg in sodium chloride 0.9 % 100 mL IVPB ([id number])  3,000 mg IntraVENous Q6H Jf Vasquez MD   Stopped at 02/27/25 0715    sodium chloride flush 0.9 % injection 5-40 mL  5-40 mL IntraVENous 2 times per day Caitlyn Sagastume MD   10 mL at 02/27/25 0910    sodium chloride flush 0.9 % injection 5-40 mL  5-40 mL IntraVENous PRN Caitlyn Sagastume MD        0.9 % sodium chloride infusion   IntraVENous PRN Caitlyn Sagastume MD        polyethylene glycol (GLYCOLAX) packet 17 g  17 g Oral Daily PRN Caitlyn Sagastume MD        acetaminophen (TYLENOL) tablet 650 mg  650 mg Oral Q6H PRN Caitlyn Sagastume MD   650 mg at 02/26/25 1022    Or    acetaminophen (TYLENOL) suppository 650 mg  650 mg Rectal Q6H PRN Caitlyn Sagastume MD        [START ON 3/1/2025] thiamine (B-1) injection 100 mg  100 mg IntraVENous Daily Santo Long MD        heparin (porcine) injection 4,000 Units  4,000 Units IntraVENous PRN Santo Long MD        heparin (porcine) injection 2,000 Units  2,000 Units IntraVENous PRN Santo Long MD   2,000 Units at 02/23/25 0606    heparin 25,000 units in dextrose 5% 250 mL (premix) infusion  5-30 Units/kg/hr IntraVENous Continuous Santo Long MD 11.9 mL/hr at 02/27/25 0658 11.019 Units/kg/hr at 02/27/25 0658    rifAXIMin (XIFAXAN) tablet 400 mg  400 mg Oral TID Chris Yusuf MD   400 mg at 02/27/25 0908    lactulose (CHRONULAC) 10 GM/15ML solution 20 g  20 g Oral TID Chris Yusuf MD   20 g at 02/27/25 0909    sodium chloride flush 0.9 % injection 5-40 mL  5-40 mL IntraVENous 2 times per day Len Arguello DO   10 mL at 02/23/25 1009    sodium chloride flush 0.9 % injection 
failure (HCC)    Normally functioning cardiac pacemaker present    Hepatic cirrhosis (HCC)    Acute on chronic systolic (congestive) heart failure (HCC)    Hypoxia    Congestive heart failure (HCC)    Acute hypoxic respiratory failure (HCC)    ETOH abuse    Hypokalemia    PAF (paroxysmal atrial fibrillation) (HCC)       INTERVENTION    Speech Pathologist (SLP) completed education with the patient/family regarding procedure of Modified Barium Swallow Study prior to exam and then type of swallowing impairment following completion of MBSS. Reviewed current solid/liquid consistency diet recommendations --   Soft and bite size consistency solids (IDDSI level 6) with  thin liquids (IDDSI level 0) and discussed compensatory strategies (DOUBLE SWALLOW, NO STRAWS, SMALL BITES/SIPS) to ensure safe PO intake. Images from MBSS reviewed with patient/ family and education provided. Reviewed aspiration precautions. Encouraged patient and/or family to engage SLP in unstructured Q&A session relative to identified deficit areas; indicated understanding of all information provided via satisfactory verbal response.    CPT Code: 35478  dysphagia tx    Marcus Chapa M.S., CCC-SLP/L   Speech-Language Pathologist  [id number]             
median sternotomy.  Aortic valve  replacement with mitral valve replacement.  Moderate coronary calcifications.    Pleura: No effusions    Lungs and airways: Clear    ABDOMEN/PELVIS:  EG junction, stomach and duodenal sweep: Unremarkable    Liver: Hepatic steatosis with cirrhotic liver morphology.  Small recanalized  paraumbilical vein.  No discrete lesion.    Gallbladder: Distended gallbladder with gallstones.    Biliary tree: Unremarkable    Pancreas: Unremarkable for patient's age.    Spleen: Unremarkable    Kidneys and ureters: Small nonobstructing left-sided renal calculi with  additional subcentimeter left-sided renal cysts although many are too small  to definitely characterize.    Adrenal glands: Unremarkable    Retroperitoneal structures:  Unremarkable    Small bowel and colon: No evidence of a bowel obstruction or free air    Appendix: Not seen    Urinary bladder: Unremarkable    Free fluid/air: None    Lymph nodes: No enlarged lymph nodes    Osseus structures: No destructive lesion    Vasculature: No aneurysm    Other: Mild infiltration seen across the low lower abdomen and pelvis  particularly left lower quadrant could relate to subcutaneous edema or  contusion in the right clinical setting.    Impression  1. No evidence for pulmonary embolism.  2. No posttraumatic abnormality seen within the chest, abdomen or pelvis.  3. Hepatic steatosis with cirrhotic liver fall G without discrete lesion.  4. Distended gallbladder with cholelithiasis without evidence of  cholecystitis by this technique.  5. Subcutaneous edema is seen across the lower abdomen and pelvis that could  relate to contusion versus edema.  Recommend clinical correlation.            Recent Labs     02/17/25  1638 02/18/25  0600 02/18/25  1417 02/19/25  1019 02/20/25  0515   INR 4.6  --  4.6 3.4 2.8   ALT 26 20  --   --   --    AST 59* 39  --   --   --    ALKPHOS 211* 189*  --   --   --    BILITOT 2.1* 2.2*  --   --   --      Lab Results 
concern for active bleeding in the setting of coagulopathy  Do not reverse INR in the setting of valve replacement  Colonoscopy this year on outpatient basis  If hemoglobin greater than 7  Consider IV iron  Discontinue IV bumex  S/p 4 units PRBC  Follow respiratory and blood culture   Social work for rehab  Palliative care following for goals of care      PT/OT evaluation: not indicated at this time   Antimicrobials: Unasyn (7/7)  Isolation: None  DVT prophylaxis: Warfarin, heparin drip  GI prophylaxis: Protonix  Diet:   Diet NPO Exceptions are: Sips of Water with Meds  ADULT TUBE FEEDING; Nasogastric; Standard with Fiber; Continuous; 10; Yes; 10; Q 4 hours; 60; 500; Q 3 hours; Protein; 1 Dose; Daily   Bowel regimen: Glycolax as needed, Lactulose  Pain management: as needed      Taiwo Sullivan MD, PGY-1  Attending physician: Dr. Rosa    [facility]  Department of Pulmonary, Critical Care and Sleep Medicine  [facility]  Department of Internal Medicine  Attending Statement    This patient has a high probability of sudden clinically significant deterioration, which requires the highest level of physician preparedness to intervene urgently. I managed/supervised life or organ supporting interventions that required frequent physician assessment. I devoted my full attention to the direct care of this patient for the period of time indicated below. Time I spent with family of surrogate(s) is included only if the patient was incapable of providing the necessary information or participating in medical decision making. In addition to time devoted to teaching and to any procedure. CCT 46 minutes    Nikhil Rosa DO, BRETT, FACP, FCCP    
mm [employer] Jose mechanical aortic valve prosthesis present and functioning   normally.     BEATRIZ 2/8/2023 (Dr. Harvey):   29 mm [employer] Jose mechanical mitral prosthesis.   Leaflets well visualized and have normal motion.   No evidence of mitral valve stenosis.   Mild-moderate paravalvular regurgitation through perforation of mitral annulus at the anterolateral aspect of the annulus.   23 mm [employer] Jose mechanical aortic valve prosthesis, not well visualized.     TTE 3/24/2023 [facility]:  1. Left ventricular systolic function is normal.  2. Abnormal septal motion consistent with post-operative status.  3. Spectral Doppler shows an abnormal pattern of left ventricular diastolic filling.  4. There is a mechanical aortic valve prosthesis present.  Aortic Valve: There is a prosthetic aortic valve present. There is a a [employer] Jose mechanical aortic valve prosthesis, with a 22 mm reported size. There is no bette-prosthetic aortic valve regurgitation. There is no evidence of aortic valve regurgitation. The peak instantaneous gradient of the aortic valve is 37.7 mmHg. The mean gradient of the aortic valve is 23.0 mmHg.  Mitral Valve: There is a prosthetic mitral valve present. There is a [employer] Jose mechanical mitral valve prosthesis with a 29 mm reported size. There is no prosthetic mitral valve regurgitaion. There is no evidence of mitral valve regurgitation.      TTE 2/28/24 Dr Lyles   Interpretation Summary     Left Ventricle: The left ventricle was suboptimally visualized in spite of the use of echocardiographic contrast and appears at the upper limits of normal in size with borderline increased thickness of endocardial surfaces.  Echocardiographic contrast was administered to enhance endocardial definition.  Abnormal septal motion was present both on the basis of prior cardiac surgery and ventricular pacing in addition to abnormal activation of the apical segment on the basis of the latter.  No additional regional wall 
400mg TID, titrate to 2-3 bowel movements per day  Hold metoprolol for now for concerns of hypotension  Cardiology on board:  Continue heparin drip and bridge to warfarin once GI is done with anemia workup  Avoid Qtc prolonging medications  Remains on BIPAP, wean as tolerated  Continue unasyn every 6 hours for 7 days  Continue protonix twice daily  GI following:  Hold steroids for now in the setting of volume overload and concern for active bleeding  No plans for immediate scope. For EGD once INR 2-2.5 on heparin drip and once mental status improves from DT  Avoid nephrotoxic agents  Strict intake and output  Free water flushed adjusted to 100mL every 4 hours  Replace electrolytes as appropriate  S/p 2 units PRBC. Ordered 1 unit PRBC  Follow on culture results  Social work for rehab    PT/OT evaluation: not indicated at this time   Antimicrobials: Unasyn (2/7)  DVT prophylaxis: Heparin drip  GI prophylaxis: Protonix 40mg IV  Diet:   Diet NPO  ADULT TUBE FEEDING; Nasogastric; Standard with Fiber; Continuous; 10; Yes; 10; Q 4 hours; 45; 300; Q 4 hours   Bowel regimen: Lactulose  Pain management: as needed  Code status: Full Code   Disposition: Continue Current Care  Family: updated as available    Next of Kin/ POA:  Primary Emergency Contact: AnicetoLeonie KARISHMA, Home Phone: [phone]    Valentín Velez MD, PGY-1  Attending physician: Dr. Bay Eugene    NOTE: This report was transcribed using voice recognition software. Every effort was made to ensure accuracy; however, inadvertent computerized transcription errors may be present.    
evaluation: not indicated at this time   Antimicrobials: Unasyn  DVT prophylaxis: Heparin drip  GI prophylaxis: Tonics 40 mg IV  Diet:   Diet NPO  ADULT TUBE FEEDING; Nasogastric; Standard with Fiber; Continuous; 10; Yes; 10; Q 4 hours; 60; 300; Q 4 hours; Protein; 1 Dose; Daily   Bowel regimen: Lactulose  Pain management: as needed  Code status: Full Code   Disposition: Continue Current Care   Family: updated as available    Next of Kin/ POA:  Primary Emergency Contact: Leonie Moreland, Home Phone: [phone]    Celso Barnes MD, PGY-1  Attending physician: Dr. Eugene    NOTE: This report was transcribed using voice recognition software. Every effort was made to ensure accuracy; however, inadvertent computerized transcription errors may be present.   
impulsivity. Medication should also be crushed in puree.     Greater than or equal to 35 minutes was spent providing face-to-face patient care, including:  and coordinating care, reviewing the chart, labs, and diagnostics, as well as medical decision making. Greater than 50% of this time was spent instructing and counseling the patient face to face regarding findings and recommendations.      Thank you for including us in the care of this patient. Please do not hesitate to contact us with any additional questions or concerns.     Discussed with Herbert Wells MD  Gastroenterology/Hepatology  Advanced Endoscopy     
lactulose and Rifaximin.   - Lactulose dosing to be titrated to 2-3 BM daily. Once he has 2 BM and mental status is at baseline, can hold additional doses for the day and start again the following day.       5. Physical deconditioning  -OT consult ordered     6. Hypoxia  -SOB improved  -Extubated and transferred out to ICU.  - Attempt was made to weaned off BIPAP. successful.     7. Dysphagia  Per speech therapy evaluation: Patient is safely consuming a soft and bite size diet with mildly thick liquid. Intermittent supervision is required at meals due to his cognitive status and impulsivity. Medication should also be crushed in puree.     We will follow closely.    Thank you for including us in the care of this patient. Please do not hesitate to contact us with any additional questions or concerns.     Discussed with Herbert Wells MD  Gastroenterology/Hepatology  Advanced Endoscopy     
non-focal exam       ASSESSMENT:      59y/M w/ history of aortic and mitral valve replacement on warfarin as well as EtOH cirrhosis with continued EtOH use presents to the ED with SOB and LE edema concerning for CHF exacerbation.      He is also found to be anemic in the setting of elevated INR with EGD last performed in May 2024 with no concerning findings.     MELD 39  Maddrey's .5        PLAN:   [facility] on EtOH Cirrhosis:  - Secondary to EtOH use and patient admits to continued, regular use.  - Will hold on steroid use in the setting of volume overload and concern for active bleeding in the setting of coagulopathy.  - Monitor labs including INR daily.  - EtOH cesssation.  - Thiamine/Folate.  - Social Work.   - CIWA.  - He is now in DT. Management per ICU.     2. Anemia:  - PPI BID.  - No plans for immediate scope while INR elevated.  - Do not reverse INR in the setting of valve replacement.  - Allow INR to drift down. Hold warfarin.  - Will need heparin gtt if INR <2.5.   - Will plan on EGD once INR 2-2.5 on heparin gtt.   - Colonoscopy this year on outpatient basis.   - Trend Hgb daily and transfuse to Hgb >7.   - Consider IV iron.  - Expect Hgb to improve once INR normalizes.   - Will continue to plan for EGD this admission after mental status improves from DT. Please place on heparin gtt until stable for EGD.      3. CHF Exacerbation:  - Cardiology has been consulted and is following.  - Defer diuresis to their service.   - Patient with FRANTZ but possibly cardiorenal.      4. Hepatic Encephalopathy:  - Continue home lactulose and Rifaximin.      We will follow closely.    Thank you for including us in the care of this patient. Please do not hesitate to contact us with any additional questions or concerns.     Herbert Wells MD  Gastroenterology/Hepatology  Advanced Endoscopy     
  New Consultations: N/A  VENT: N/A  Ppeak:  Pplateau:  Compliance:    Access: Peripheral   Consults: Cardiology, GI  Drips: Heparin   DVT Prophylaxis: Heparin   GI Prophylaxis: PPI  Nutrition: NPO  ABX: Unasyn   Completed ABX:  Blood Products:  - PRBC: 2 units PRBC  - FFP:   - SDP:  - Cryoprecipitate:     ICU Level of Care Warranted:  VENT Management: N/A, AVAPS 16/500/40/6, concern for impending respiratory failure   IV Drip Management: Heparin   ETOH Withdrawal      Thank you for allowing me to participate in the care of this patient.    Care reviewed with nursing staff, medical and surgical specialty care, primary care and the patient's family as available. Restraints are ordered when the patient can do harm to him/herself by pulling out devices.    Critical Care Time: 32 minutes excluding procedures    Aaron Rossi M.D.    Aaron Rossi MD  2/22/2025  9:59 AM    
AT/AF less than 0.1 hour/day  Night heart rate over 85 bpm for 7 days     Echocardiogram from 2/19/2025:    Left Ventricle: Low normal left ventricular systolic function with a visually estimated EF of 50%. Left ventricle size is normal. Normal wall thickness. Normal wall motion. Indeterminate diastolic function.  Abnormal septal motion present.    Right Ventricle: Right ventricle size is normal. Moderately reduced systolic function.    Aortic Valve: Bioprosthetic valve that is well-seated. AV mean gradient is 16 mmHg.    Mitral Valve: Mechanical valve that is well-seated.    Tricuspid Valve: Mild regurgitation.    Left Atrium: Left atrium is severely dilated.    Right Atrium: Right atrium is moderately dilated.    Image quality is suboptimal. Contrast used: Lumason. Technically difficult study.      Assessment:  Acute on chronic HFmEF / nonischemic cardiomyopathy   Pro BNP 1241  TTE 5/7/2024: EF 40-45%. moderate concentric hypertrophy. Mechanical AV and MV. Moderate TR. Left atrium severely dilated.   VHD / CAD   Sp/ CABG 9/2020: LIMA to LAD. 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 LVEF 54%.   Mild TR on echo 5/7/2024  On Coumadin for mechanical AVR and MVR  Supratherapeutic INR of 4.6 on this admission; history of supratherapeutic INR 5/2022  Complete heart block: dual-chamber pacemaker Medtronic 2020  - in place. Currently Ventricular paced.  FRANTZ on CKD - Cr 2.5 today (baseline 1.6-5.2)   hypomagnesemia/hypokalemia -supplemented   Lactic acidosis 6.2 >> 1.6; resolved   HLD -not compliant with atorvastatin  Currently soft BP -history of HTN  Elevated troponin 214>>199 (chronically elevated 40-70)   Anemia 7.0>> 6.0 - received 1 unit PRBC (baseline hgb 10-7)   Thrombocytopenia 100 K>> 50K on this admission  Current EtOH abuse   Alcoholic liver: Hepatic steatosis with cirrhotic liver   Distended gallbladder with cholelithiasis 
MD  Gastroenterology/Hepatology  Advanced Endoscopy       
concerns of hypotension  Continue warfarin and heparin drip, INR goal 2.5-3.5, cardiology following  Avoid Qtc prolonging medications  Continue breathing treatments (Duoneb, Brovana, Pulmicort, 3% NSS nebulization)  Continue unasyn every 6 hours for 7 days  Continue protonix twice daily  GI on board. Follow recommendations.  Avoid nephrotoxic agents  Strict intake and output  Discontinue IV bumex  Replace electrolytes as appropriate  S/p 4 units PRBC  Follow on culture results  Social work for rehab  Palliative care following for goals of care    PT/OT evaluation: not indicated at this time   Antimicrobials: Unasyn (7/7)  Isolation: None  DVT prophylaxis: Warfarin, heparin drip  GI prophylaxis: Protonix  Diet:   Diet NPO Exceptions are: Sips of Water with Meds  ADULT TUBE FEEDING; Nasogastric; Standard with Fiber; Continuous; 10; Yes; 10; Q 4 hours; 60; 500; Q 3 hours; Protein; 1 Dose; Daily   Bowel regimen: Glycolax as needed, Lactulose  Pain management: as needed    Code status: Full Code   Disposition: Continue Current Care  Family: updated as available  Next of Kin/ POA:  Primary Emergency Contact: Leonie Moreland, Home Phone: [phone]    Valentín Velez MD, PGY-1  Attending physician: Dr. Bay Eugene    NOTE: This report was transcribed using voice recognition software. Every effort was made to ensure accuracy; however, inadvertent computerized transcription errors may be present.  
drip  GI prophylaxis: Protonix  Diet:   Diet NPO  ADULT TUBE FEEDING; Nasogastric; Standard with Fiber; Continuous; 10; Yes; 10; Q 4 hours; 60; 500; Q 3 hours; Protein; 1 Dose; Daily   Bowel regimen: Glycolax as needed, Lactulose  Pain management: as needed    Code status: Full Code   Disposition: Continue Current Care  Family: updated as available  Next of Kin/ POA:  Primary Emergency Contact: Leonie Moreland, Home Phone: [phone]    Valentín Velez MD, PGY-1  Attending physician: Dr. Bya Eugene    NOTE: This report was transcribed using voice recognition software. Every effort was made to ensure accuracy; however, inadvertent computerized transcription errors may be present.  
management: as needed    Code status: Full Code   Disposition: Continue Current Care  Family: updated as available  Next of Kin/ POA:  Primary Emergency Contact: AnicetoLeonie KARISHMA, Home Phone: [phone]    Valentín Velez MD, PGY-1  Attending physician: Dr. Bay Eugene    NOTE: This report was transcribed using voice recognition software. Every effort was made to ensure accuracy; however, inadvertent computerized transcription errors may be present.  
Stopped at 02/19/25 0133    0.9 % sodium chloride infusion   IntraVENous PRN Chris Yusuf MD        pantoprazole (PROTONIX) 40 mg in sodium chloride (PF) 0.9 % 10 mL injection  40 mg IntraVENous BID Chris Yusuf MD   40 mg at 02/23/25 1007    magnesium oxide (MAG-OX) tablet 400 mg  400 mg Oral Daily Chris Yusuf MD   400 mg at 02/23/25 1006    0.9 % sodium chloride infusion   IntraVENous PRN Julianne Morales APRN - CNP        sodium chloride flush 0.9 % injection 5-40 mL  5-40 mL IntraVENous 2 times per day Len Arguello DO   10 mL at 02/23/25 1009    sodium chloride flush 0.9 % injection 5-40 mL  5-40 mL IntraVENous PRN Len Arguello DO        0.9 % sodium chloride infusion   IntraVENous PRN Len Arguello DO        prochlorperazine (COMPAZINE) tablet 10 mg  10 mg Oral Q8H PRN Len Arguello DO        Or    prochlorperazine (COMPAZINE) injection 10 mg  10 mg IntraVENous Q6H PRN Len Arguello DO   10 mg at 02/21/25 2241    warfarin placeholder: dosing by pharmacy   Oral RX Placeholder Len Arguello DO            XR CHEST PORTABLE   Final Result   1. Limited due to low lung volumes.   2. Pulmonary vascular congestion appearing similar compared to prior from   February 19th.         XR ABDOMEN FOR NG/OG/NE TUBE PLACEMENT   Final Result   1. Gastric tube with good positioning.   2. Modestly dilated loops of small bowel over the abdomen suggesting ileus or   obstruction.         FL MODIFIED BARIUM SWALLOW W VIDEO   Final Result   1.  Penetration of thin liquid barium with barium residuals.  No barium   aspiration.      2.  Please see separate speech pathology report for full discussion of   findings and recommendations.         XR CHEST PORTABLE   Final Result   1. Limited due to low lung volumes.   2. Interstitial prominence throughout both lungs which may indicate pulmonary   vascular congestion.         CT HEAD WO CONTRAST   Final Result   No evidence of acute intracranial

## 2025-03-17 NOTE — DISCHARGE SUMMARY
times daily     tamsulosin 0.4 MG capsule  Commonly known as: FLOMAX  Take 1 capsule by mouth at bedtime     thiamine 100 MG tablet  Take 1 tablet by mouth daily     Vitamin D 25 MCG (1000 UT) Tabs tablet  Commonly known as: CHOLECALCIFEROL  Take 1 tablet by mouth daily            CONTINUE taking these medications      nicotine 21 MG/24HR  Commonly known as: NICODERM CQ  Place 1 patch onto the skin daily     warfarin 1 MG tablet  Commonly known as: COUMADIN  Take 1 tablet once daily            STOP taking these medications      metoprolol succinate 25 MG extended release tablet  Commonly known as: TOPROL XL                  Physician(s) Follow Up:  [place] [address]  [phone]          Condition at Discharge: Stable    Disposition:  SNF      The patient's condition is stable.  At this time the patient is without objective evidence of an acute process requiring continuing hospitalization or inpatient management.  They are stable for discharge with outpatient follow-up.     I have spoken with the patient and discussed the results of the current hospitalization, in addition to providing specific details for the plan of care and counseling regarding the diagnosis and prognosis.  The plan has been discussed in detail and they are aware of the specific conditions for emergent return, as well as the importance of follow-up.  Their questions are answered at this time and they are agreeable with the plan for discharge    I reviewed discharge recommendations with the patient in person/family.      On day of discharge I saw Len Dorsey and Total time spent on day of encounter: 35 minutes on discharge.    Completed by: Jimmie Heart MD on 03/17/25, 11:34 AM

## 2025-03-17 NOTE — CARE COORDINATION
Peer Recovery Support Note       Name:  Len Dorsey   Date:    2/20/2025        Chief Complaint   Patient presents with    Shortness of Breath     Pt from home COPD 78% on room air, cpap on arrival. BLE edema    Fall     Rx falls, fall from standing at home witnessed by wife, hematoma R side/back           Peer Support met with patient.  [x] Support and education provided  [] Resources provided   [] Treatment referral:   [] Other:   [] Patient declined peer recovery services      Referred By: Emiliano (RN/CM)     Notes: Peer met with patient. Patient was willing to engage with peer. Patient wasn't feeling well. Yet, patient is eager to pay attention to began receiving treatment for his alcohol dependence. Patient is aware that [facility] will be willing to accept him once he is medically cleared. Peer shared lived experience and offered patient hope, steve, & courage. Patient appears willing and ready to get the help he needs to live a sober life. Peer will follow up with patient on Friday, February 21, 2025.  
     Peer Recovery Support Note       Name:  Len Dorsey   Date:    2/21/2025        Chief Complaint   Patient presents with    Shortness of Breath     Pt from home COPD 78% on room air, cpap on arrival. BLE edema    Fall     Rx falls, fall from standing at home witnessed by wife, hematoma R side/back           Peer Support met with patient.  [x] Support and education provided  [] Resources provided   [x] Treatment referral: [facility]  [] Other:   [] Patient declined peer recovery services      Referred By: Emiliano (RN/CM)     Notes:  Peer met with patient at bedside. Patient is interested in getting the \"monkey off his back!\" Patient is eager to go into inpatient treatment upon discharge. Patient's rachel Hadley reached out to peer to share and discuss her concerns as it relates to the patient. Peer shared the conversation with the social work team. Social work team is preparing documentation to forward over to [facility], upon update from doctors. Peer shared [facility] contacts (Elly [phone] & Timothy [phone]) with social work team to ensure an easy and seamless transition. Patient thanked peer for dedication and for following up with him.  
02/28/2025 Pt LOS day 11. Dx Hypoxia. Pt has been having bleeding issues so bedside EGD was to occur today. Required PRBC. BS has been elevated, per physicians pt experiencing delirium tremens w. Alcohol withdrawal syndrome. Pt is clinically not stable to determine what the DC plan is going to be.  Erin Vicente had referral previously-can reach back out if appropriate. CM/SW to follow for needs and planning. Ely Briones RN CM  
CM Update: Call placed to [facility], they are  unable to accept, stated he needs a higher level of care prior to going to alcohol rehab. Spoke to sig other, pt has been to [facility] but she does not want him to return. I did tell her our options are limited. I told her I would make a referral to [facility]. Call placed to Camilla [phone] (Erin is on maternity leave). Will await call back (TF)        JEANTETE Whitfield,RN  Case Management  [phone]    
CM Update: Met with patient at bedside to discuss transition of care plan. Discharge plan is ETOH rehab vs home. Patient agreeable to speaking with [facility]. Referral called 2/19. Patient and Leonie ([facility]) would prefer [facility] residential program (45 day program). Spoke with Ruth at [facility]. They will need the following faxed to [phone] once medically stable: Face Sheet, Labs, MAR and clinicals. If patient needs oxygen at discharge, the oxygen concentrator must be delivered to [facility] prior to patient's arrival. [facility]'s phone number is [phone]. Patient came to hospital with SOB and fall at home with a right sided hematoma. Hx ETOH abuse. CIWA scale. 6 liters nasal cannula. Room air baseline. Will need pulse ox testing prior to discharge. Hgb 7.4, INR 2.8. Plan: place on Heparin drip once INR between 2.0 and 2.5. EGD to follow. CM/SW to follow. Emiliano Person [phone]    
CM update note.  Discharge order noted.  Transport set up via stretcher with [facility].   time is between 2612-0333.  Nurse will need to call report to [phone].  Facility liaison, patient POA and RN notified of  time.  Ambulance form with envelope is on the soft chart.  Rod Licea RN -[phone].    
CM update note.  Discharge plan is [facility] when medically ready.  Will need PT/OT evals.  No precert required.  Swati/destination/7000 are completed.  Ambulance form with envelope is on the soft chart.  Remains on heparin gtt, IV Bumex , IV cefepime q 12, IV folic acid daily, IV thiamine daily.  Pharmacy is dosing the coumadin, INR 1.6 with goal of 2.5-3.5.  Patient has MBS and SLP recommending soft and bite size solids with nectar consistency.  PT/OT evals pending.  CM/SW to follow.  Rod Licea RN -[phone].   
CM update note.  Discharge plan is [facility] when medically ready.  No precert required. Swati/destination/7000 are completed. PT/OT evals completed yesterday.  Ambulance form with envelope is on the soft chart.  Remains on heparin gtt.  Pharmacy is dosing the coumadin.  INR 1.8 today with goal of 2.5-3.5.  CM/SW to follow.  Rod Licea RN -[phone].            
CM update note.  Discharge plan is [facility] when medically ready.  No precert required. Swati/destination/7000 are completed. PT/OT evals completed yesterday.  Ambulance form with envelope is on the soft chart.  Remains on heparin gtt.  Pharmacy is dosing the coumadin.  INR 1.7 again today with goal of 2.5-3.5.  Coumadin 5 mg ordered.  Spoke with Camilla from [facility].  If patient is medically ready they can accept over the weekend.  CM/SW to follow.  Rod Licea RN -[phone].    
CM update note.  Discharge plan is [facility] when medically ready.  No precert required. Swati/destination/7000 are completed. PT/OT evals completed yesterday.  Ambulance form with envelope is on the soft chart.  Remains on heparin gtt.  Pharmacy is dosing the coumadin.  INR 1.7 today with goal of 2.5-3.5.  CM/SW to follow.  Rod Licea RN -[phone].    
CM update note.  Discharge plan is [facility] when medically ready.  No precert required. Swati/destination/7000 are completed. Ambulance form with envelope is on the soft chart.  Therapy was able to work with patient yesterday.  Am-pac is 10.  Updated Camilla at [facility].  Remains on Heparin gtt.  Pharmacy is dosing the coumadin, INR 1.6 again today with goal of 2.5-3.5  coumadin does increased today to 3 mg.  CM/SW to follow.  Rod Licea RN -[phone].    
CM update note.  Patient was transferred out of ICU level of care.  Nephrology consulted today for FRANTZ, hypernatremia.  D 5 @ 75 ml/hr ordered.   Remains on Heparin gtt, IV cefepime q 12, and IV bumex BID.  SLP to re-eval today. Corepak insertion on hold.  Spoke with Camilla from [facility].  They can accept with a corepak.  Discharge plan is [facility] when medically stable.  Will need PT/OT evlas.  No precert is required.  Swati/destination/7000 are completed.  Ambulance form with envelope placed on the soft chart. Updated patient s/o/POA Leonie.  CM/SW to follow.  Rod Licea RN [phone].    
Care Coordination LOS 7 day. RRT from 45 on 2/21/25 d/t agitation and concerns for alcohol withdrawal and delirium tremers.  Precedex- weaned off, Unasyn, heparin gtt, CIWA.  Needs unclear, per previous CM, referral was made to [facility], will follow up with liaison    Electronically signed by Laquita Goodwin RN on 2/24/2025 at 3:19 PM   
Care Coordination: LOS 15 day. Vent( 2/25), Precedex, heparin gtt., cefepime,  Bronch 3/3-mucoid impaction, Specimen sent purulent fluid.  Acute encephalopathy 2/2 alcohol withdrawal,hepatic encephalopathy. Hx CVA  L sided residual weakness.  Prior CM made referral to [facility]- needs unclear- will reach back out to facility when appropriate.    Electronically signed by Laquita Goodwin RN on 3/4/2025 at 12:38 PM   
Care Coordination: LOS 17 day Extubated 3/5/25, Currently on 3 ltr, heparin gtt, Bumex iv bid, Cefepime(3/11), SLP ordered. Would benefit from therapy evals when medically appropriate. Call to [facility] and [facility]. She is adamant he go to rehab, she had chose [facility] prior to transfer to [facility], she has also been in touch with [facility] and he will need mental health services for depression- which she believes he can begin at [facility]. She was updated on transfer.  Spoke to Camilla at [facility], edgar provided and she will review and follow.      Electronically signed by Laquita Goodwin RN on 3/6/2025 at 11:28 AM     ADDENDUM: per Camilla at [facility], can accept. 7000, transport and priscilla completed  Electronically signed by Laquita Goodwin RN on 3/6/2025 at 3:00 PM   
related to the following treatment goals of Hypokalemia [E87.6]  Lactic acidosis [E87.20]  ETOH abuse [F10.10]  Hypoxia [R09.02]  Elevated partial thromboplastin time (PTT) [R79.1]  Supratherapeutic INR [R79.1]  Multiple falls [R29.6]  Closed head injury, initial encounter [S09.90XA]  Traumatic ecchymosis of flank, initial encounter [S30.1XXA]  Congestive heart failure, unspecified HF chronicity, unspecified heart failure type (HCC) [I50.9]  Acute hypoxic respiratory failure (HCC) [J96.01]    IF APPLICABLE: The Patient and/or patient representative Len and his family were provided with a choice of provider and agrees with the discharge plan. Freedom of choice list with basic dialogue that supports the patient's individualized plan of care/goals and shares the quality data associated with the providers was provided to:     Patient Representative Name:       The Patient and/or Patient Representative Agree with the Discharge Plan?      Emiliano Person RN BSN  Case Management  [phone]

## 2025-03-17 NOTE — PLAN OF CARE
Problem: Chronic Conditions and Co-morbidities  Goal: Patient's chronic conditions and co-morbidity symptoms are monitored and maintained or improved  2/19/2025 1324 by Kelin Cruz, RN  Outcome: Progressing  2/18/2025 2329 by Evelyne Zapata, RN  Outcome: Progressing     Problem: Discharge Planning  Goal: Discharge to home or other facility with appropriate resources  2/18/2025 2329 by Evelyne Zapata, RN  Outcome: Progressing     Problem: Safety - Adult  Goal: Free from fall injury  Outcome: Progressing     Problem: Skin/Tissue Integrity  Goal: Skin integrity remains intact  Description: 1.  Monitor for areas of redness and/or skin breakdown  2.  Assess vascular access sites hourly  3.  Every 4-6 hours minimum:  Change oxygen saturation probe site  4.  Every 4-6 hours:  If on nasal continuous positive airway pressure, respiratory therapy assess nares and determine need for appliance change or resting period  Outcome: Progressing     
  Problem: Chronic Conditions and Co-morbidities  Goal: Patient's chronic conditions and co-morbidity symptoms are monitored and maintained or improved  2/19/2025 2302 by Terra Sarkar RN  Outcome: Progressing  2/19/2025 1324 by Kelin Cruz RN  Outcome: Progressing     Problem: Safety - Adult  Goal: Free from fall injury  2/19/2025 2302 by Terra Sarkar RN  Outcome: Progressing  2/19/2025 1324 by Kelin Cruz RN  Outcome: Progressing     
  Problem: Chronic Conditions and Co-morbidities  Goal: Patient's chronic conditions and co-morbidity symptoms are monitored and maintained or improved  2/20/2025 2219 by Terra Sarkar RN  Outcome: Progressing  2/20/2025 1106 by Lionel Rowe RN  Outcome: Progressing  Flowsheets (Taken 2/20/2025 0800)  Care Plan - Patient's Chronic Conditions and Co-Morbidity Symptoms are Monitored and Maintained or Improved: Monitor and assess patient's chronic conditions and comorbid symptoms for stability, deterioration, or improvement     Problem: Safety - Adult  Goal: Free from fall injury  2/20/2025 2219 by Terra Sarkar RN  Outcome: Progressing  2/20/2025 1106 by Lionel Rowe RN  Outcome: Progressing  Flowsheets (Taken 2/20/2025 1105)  Free From Fall Injury: Instruct family/caregiver on patient safety     
  Problem: Chronic Conditions and Co-morbidities  Goal: Patient's chronic conditions and co-morbidity symptoms are monitored and maintained or improved  2/22/2025 1308 by Arlene Cummings RN  Outcome: Progressing  2/22/2025 0357 by Amy Roper RN  Outcome: Not Progressing     Problem: Discharge Planning  Goal: Discharge to home or other facility with appropriate resources  2/22/2025 1308 by Arlene Cummings RN  Outcome: Progressing  2/22/2025 0357 by Amy Roper RN  Outcome: Not Progressing     Problem: Safety - Adult  Goal: Free from fall injury  2/22/2025 1308 by Arlene Cummings RN  Outcome: Progressing  2/22/2025 0357 by Amy Roper RN  Outcome: Progressing     Problem: Skin/Tissue Integrity  Goal: Skin integrity remains intact  Description: 1.  Monitor for areas of redness and/or skin breakdown  2.  Assess vascular access sites hourly  3.  Every 4-6 hours minimum:  Change oxygen saturation probe site  4.  Every 4-6 hours:  If on nasal continuous positive airway pressure, respiratory therapy assess nares and determine need for appliance change or resting period  2/22/2025 1308 by Arlene Cummings RN  Outcome: Progressing  Flowsheets (Taken 2/22/2025 0800)  Skin Integrity Remains Intact: Monitor for areas of redness and/or skin breakdown  2/22/2025 0357 by Amy Roper RN  Outcome: Progressing     Problem: Pain  Goal: Verbalizes/displays adequate comfort level or baseline comfort level  2/22/2025 1308 by Arlene Cummings RN  Outcome: Progressing  Flowsheets  Taken 2/22/2025 1200  Verbalizes/displays adequate comfort level or baseline comfort level:   Encourage patient to monitor pain and request assistance   Assess pain using appropriate pain scale   Administer analgesics based on type and severity of pain and evaluate response   Implement non-pharmacological measures as appropriate and evaluate response   Consider cultural and social influences on pain 
  Problem: Chronic Conditions and Co-morbidities  Goal: Patient's chronic conditions and co-morbidity symptoms are monitored and maintained or improved  3/11/2025 1025 by Analia Hook RN  Outcome: Progressing  3/10/2025 2318 by Cesar Ley RN  Outcome: Progressing     Problem: Discharge Planning  Goal: Discharge to home or other facility with appropriate resources  3/11/2025 1025 by Analia Hook RN  Outcome: Progressing  3/10/2025 2318 by Cesar Ley, RN  Outcome: Progressing     Problem: Safety - Adult  Goal: Free from fall injury  3/11/2025 1025 by Analia Hook RN  Outcome: Progressing  3/10/2025 2318 by Cesar Ley, RN  Outcome: Progressing     
  Problem: Chronic Conditions and Co-morbidities  Goal: Patient's chronic conditions and co-morbidity symptoms are monitored and maintained or improved  3/11/2025 2145 by Michelle Portillo RN  Outcome: Progressing  3/11/2025 1025 by Analia Hook RN  Outcome: Progressing     Problem: Discharge Planning  Goal: Discharge to home or other facility with appropriate resources  3/11/2025 2145 by Michelle Portillo RN  Outcome: Progressing  3/11/2025 1025 by Analia Hook, RN  Outcome: Progressing     Problem: Safety - Adult  Goal: Free from fall injury  3/11/2025 1025 by Analia Hook, RN  Outcome: Progressing     
  Problem: Chronic Conditions and Co-morbidities  Goal: Patient's chronic conditions and co-morbidity symptoms are monitored and maintained or improved  3/14/2025 0141 by Morgan Burnette RN  Outcome: Progressing  3/13/2025 1608 by Katrin Flor RN  Outcome: Progressing     Problem: Discharge Planning  Goal: Discharge to home or other facility with appropriate resources  3/14/2025 0141 by Morgan Burnette RN  Outcome: Progressing  3/13/2025 1608 by Katrin Flor RN  Outcome: Progressing     Problem: Safety - Adult  Goal: Free from fall injury  3/14/2025 0141 by Morgan Burnette RN  Outcome: Progressing  3/13/2025 1608 by Katrin Flor RN  Outcome: Progressing     Problem: Skin/Tissue Integrity  Goal: Skin integrity remains intact  Description: 1.  Monitor for areas of redness and/or skin breakdown  2.  Assess vascular access sites hourly  3.  Every 4-6 hours minimum:  Change oxygen saturation probe site  4.  Every 4-6 hours:  If on nasal continuous positive airway pressure, respiratory therapy assess nares and determine need for appliance change or resting period  3/13/2025 1608 by Katrin Flor RN  Outcome: Progressing     Problem: Pain  Goal: Verbalizes/displays adequate comfort level or baseline comfort level  3/13/2025 1608 by Katrin Flor RN  Outcome: Progressing     Problem: ABCDS Injury Assessment  Goal: Absence of physical injury  Recent Flowsheet Documentation  Taken 3/14/2025 0139 by Morgan Burnette RN  Absence of Physical Injury: Implement safety measures based on patient assessment  3/13/2025 1608 by Katrin Flor RN  Outcome: Progressing     Problem: Neurosensory - Adult  Goal: Achieves stable or improved neurological status  3/13/2025 1608 by Katrin Flor RN  Outcome: Progressing  Goal: Absence of seizures  3/13/2025 1608 by Katrin Flor RN  Outcome: Progressing  Goal: Remains free of injury related to seizures activity  3/13/2025 1608 by 
  Problem: Chronic Conditions and Co-morbidities  Goal: Patient's chronic conditions and co-morbidity symptoms are monitored and maintained or improved  3/14/2025 0902 by Katrin Flor RN  Outcome: Progressing  3/14/2025 0141 by Morgan Burnette RN  Outcome: Progressing     Problem: Discharge Planning  Goal: Discharge to home or other facility with appropriate resources  3/14/2025 0902 by Katrin Flor RN  Outcome: Progressing  3/14/2025 0141 by Morgan Burnette RN  Outcome: Progressing     Problem: Safety - Adult  Goal: Free from fall injury  3/14/2025 0902 by Katrin Flor RN  Outcome: Progressing  3/14/2025 0141 by Morgan Burnette RN  Outcome: Progressing     Problem: Skin/Tissue Integrity  Goal: Skin integrity remains intact  Description: 1.  Monitor for areas of redness and/or skin breakdown  2.  Assess vascular access sites hourly  3.  Every 4-6 hours minimum:  Change oxygen saturation probe site  4.  Every 4-6 hours:  If on nasal continuous positive airway pressure, respiratory therapy assess nares and determine need for appliance change or resting period  Outcome: Progressing     Problem: Pain  Goal: Verbalizes/displays adequate comfort level or baseline comfort level  Outcome: Progressing     Problem: ABCDS Injury Assessment  Goal: Absence of physical injury  Outcome: Progressing  Flowsheets (Taken 3/14/2025 0139 by Morgan Burnette, RN)  Absence of Physical Injury: Implement safety measures based on patient assessment     Problem: Neurosensory - Adult  Goal: Achieves stable or improved neurological status  Outcome: Progressing  Goal: Absence of seizures  Outcome: Progressing  Goal: Remains free of injury related to seizures activity  Outcome: Progressing  Goal: Achieves maximal functionality and self care  Outcome: Progressing     Problem: Respiratory - Adult  Goal: Achieves optimal ventilation and oxygenation  Outcome: Progressing     Problem: Cardiovascular - 
  Problem: Chronic Conditions and Co-morbidities  Goal: Patient's chronic conditions and co-morbidity symptoms are monitored and maintained or improved  3/16/2025 2246 by Farnaz Garrison RN  Outcome: Progressing     Problem: Discharge Planning  Goal: Discharge to home or other facility with appropriate resources  3/16/2025 2246 by Farnaz Garrison RN  Outcome: Progressing     Problem: Safety - Adult  Goal: Free from fall injury  3/16/2025 2246 by Farnaz Garrison RN  Outcome: Progressing     Problem: Skin/Tissue Integrity  Goal: Skin integrity remains intact  Description: 1.  Monitor for areas of redness and/or skin breakdown  2.  Assess vascular access sites hourly  3.  Every 4-6 hours minimum:  Change oxygen saturation probe site  4.  Every 4-6 hours:  If on nasal continuous positive airway pressure, respiratory therapy assess nares and determine need for appliance change or resting period  Outcome: Progressing     Problem: Pain  Goal: Verbalizes/displays adequate comfort level or baseline comfort level  Outcome: Progressing     Problem: Neurosensory - Adult  Goal: Achieves stable or improved neurological status  Outcome: Progressing     Problem: Neurosensory - Adult  Goal: Absence of seizures  Outcome: Progressing     Problem: Neurosensory - Adult  Goal: Remains free of injury related to seizures activity  Outcome: Progressing     Problem: Skin/Tissue Integrity - Adult  Goal: Skin integrity remains intact  Description: 1.  Monitor for areas of redness and/or skin breakdown  2.  Assess vascular access sites hourly  3.  Every 4-6 hours minimum:  Change oxygen saturation probe site  4.  Every 4-6 hours:  If on nasal continuous positive airway pressure, respiratory therapy assess nares and determine need for appliance change or resting period  Outcome: Progressing     Problem: Gastrointestinal - Adult  Goal: Minimal or absence of nausea and vomiting  Outcome: Progressing     Problem: 
  Problem: Chronic Conditions and Co-morbidities  Goal: Patient's chronic conditions and co-morbidity symptoms are monitored and maintained or improved  3/7/2025 1208 by Sumit Thomas RN  Outcome: Progressing     Problem: Discharge Planning  Goal: Discharge to home or other facility with appropriate resources  Outcome: Progressing     Problem: Safety - Adult  Goal: Free from fall injury  3/7/2025 1208 by Sumit Thomas RN  Outcome: Progressing     Problem: Skin/Tissue Integrity  Goal: Skin integrity remains intact  Description: 1.  Monitor for areas of redness and/or skin breakdown  2.  Assess vascular access sites hourly  3.  Every 4-6 hours minimum:  Change oxygen saturation probe site  4.  Every 4-6 hours:  If on nasal continuous positive airway pressure, respiratory therapy assess nares and determine need for appliance change or resting period  Outcome: Progressing     Problem: Pain  Goal: Verbalizes/displays adequate comfort level or baseline comfort level  Outcome: Progressing     Problem: ABCDS Injury Assessment  Goal: Absence of physical injury  Outcome: Progressing     Problem: Skin/Tissue Integrity - Adult  Goal: Skin integrity remains intact  Description: 1.  Monitor for areas of redness and/or skin breakdown  2.  Assess vascular access sites hourly  3.  Every 4-6 hours minimum:  Change oxygen saturation probe site  4.  Every 4-6 hours:  If on nasal continuous positive airway pressure, respiratory therapy assess nares and determine need for appliance change or resting period  Outcome: Progressing     Problem: Nutrition Deficit:  Goal: Optimize nutritional status  Outcome: Progressing     
  Problem: Chronic Conditions and Co-morbidities  Goal: Patient's chronic conditions and co-morbidity symptoms are monitored and maintained or improved  3/8/2025 0933 by Sumit Thomas RN  Outcome: Progressing     Problem: Discharge Planning  Goal: Discharge to home or other facility with appropriate resources  Outcome: Progressing     Problem: Safety - Adult  Goal: Free from fall injury  Outcome: Progressing     Problem: Skin/Tissue Integrity  Goal: Skin integrity remains intact  Description: 1.  Monitor for areas of redness and/or skin breakdown  2.  Assess vascular access sites hourly  3.  Every 4-6 hours minimum:  Change oxygen saturation probe site  4.  Every 4-6 hours:  If on nasal continuous positive airway pressure, respiratory therapy assess nares and determine need for appliance change or resting period  Outcome: Progressing     Problem: Pain  Goal: Verbalizes/displays adequate comfort level or baseline comfort level  Outcome: Progressing     Problem: ABCDS Injury Assessment  Goal: Absence of physical injury  Outcome: Progressing     Problem: Neurosensory - Adult  Goal: Achieves stable or improved neurological status  Outcome: Progressing     Problem: Skin/Tissue Integrity - Adult  Goal: Skin integrity remains intact  Description: 1.  Monitor for areas of redness and/or skin breakdown  2.  Assess vascular access sites hourly  3.  Every 4-6 hours minimum:  Change oxygen saturation probe site  4.  Every 4-6 hours:  If on nasal continuous positive airway pressure, respiratory therapy assess nares and determine need for appliance change or resting period  Outcome: Progressing     
  Problem: Chronic Conditions and Co-morbidities  Goal: Patient's chronic conditions and co-morbidity symptoms are monitored and maintained or improved  3/9/2025 1032 by Rajani Mendes RN  Outcome: Progressing     Problem: Discharge Planning  Goal: Discharge to home or other facility with appropriate resources  3/9/2025 1032 by Rajani Mendes RN  Outcome: Progressing     Problem: Safety - Adult  Goal: Free from fall injury  3/9/2025 1032 by Rajani Mendes RN  Outcome: Progressing     Problem: Skin/Tissue Integrity  Goal: Skin integrity remains intact  Description: 1.  Monitor for areas of redness and/or skin breakdown  2.  Assess vascular access sites hourly  3.  Every 4-6 hours minimum:  Change oxygen saturation probe site  4.  Every 4-6 hours:  If on nasal continuous positive airway pressure, respiratory therapy assess nares and determine need for appliance change or resting period  3/9/2025 1032 by Rajani Mendes RN  Outcome: Progressing  Flowsheets (Taken 3/9/2025 1028)  Skin Integrity Remains Intact: Monitor for areas of redness and/or skin breakdown     Problem: Pain  Goal: Verbalizes/displays adequate comfort level or baseline comfort level  3/9/2025 1032 by Rajani Mendes RN  Outcome: Progressing     Problem: ABCDS Injury Assessment  Goal: Absence of physical injury  3/9/2025 1032 by Rajani Mendes RN  Outcome: Progressing     Problem: Neurosensory - Adult  Goal: Achieves stable or improved neurological status  3/9/2025 1032 by Rajani Mendes RN  Outcome: Progressing     Problem: Neurosensory - Adult  Goal: Absence of seizures  3/9/2025 1032 by Rajani Mendes RN  Outcome: Progressing     Problem: Neurosensory - Adult  Goal: Remains free of injury related to seizures activity  3/9/2025 1032 by Rajani Mendes RN  Outcome: Progressing     Problem: Neurosensory - Adult  Goal: Achieves maximal functionality and self care  3/9/2025 1032 by Rajani Mendes RN  Outcome: Progressing     Problem: Respiratory 
  Problem: Chronic Conditions and Co-morbidities  Goal: Patient's chronic conditions and co-morbidity symptoms are monitored and maintained or improved  Outcome: Progressing     
  Problem: Chronic Conditions and Co-morbidities  Goal: Patient's chronic conditions and co-morbidity symptoms are monitored and maintained or improved  Outcome: Progressing     Problem: Discharge Planning  Goal: Discharge to home or other facility with appropriate resources  Outcome: Progressing     
  Problem: Chronic Conditions and Co-morbidities  Goal: Patient's chronic conditions and co-morbidity symptoms are monitored and maintained or improved  Outcome: Progressing     Problem: Discharge Planning  Goal: Discharge to home or other facility with appropriate resources  Outcome: Progressing     
  Problem: Chronic Conditions and Co-morbidities  Goal: Patient's chronic conditions and co-morbidity symptoms are monitored and maintained or improved  Outcome: Progressing     Problem: Discharge Planning  Goal: Discharge to home or other facility with appropriate resources  Outcome: Progressing     Problem: Safety - Adult  Goal: Free from fall injury  3/3/2025 1052 by Adrianna Davalos RN  Outcome: Progressing  3/3/2025 0711 by Yanci Villanueva RN  Outcome: Progressing     Problem: Skin/Tissue Integrity  Goal: Skin integrity remains intact  Description: 1.  Monitor for areas of redness and/or skin breakdown  2.  Assess vascular access sites hourly  3.  Every 4-6 hours minimum:  Change oxygen saturation probe site  4.  Every 4-6 hours:  If on nasal continuous positive airway pressure, respiratory therapy assess nares and determine need for appliance change or resting period  3/3/2025 1052 by Adrianna Davalos RN  Outcome: Progressing  3/3/2025 0711 by Yanci Villanueva RN  Outcome: Progressing     Problem: Pain  Goal: Verbalizes/displays adequate comfort level or baseline comfort level  Outcome: Progressing  Flowsheets  Taken 3/3/2025 1000  Verbalizes/displays adequate comfort level or baseline comfort level: Encourage patient to monitor pain and request assistance  Taken 3/3/2025 0800  Verbalizes/displays adequate comfort level or baseline comfort level: Encourage patient to monitor pain and request assistance     Problem: ABCDS Injury Assessment  Goal: Absence of physical injury  3/3/2025 1052 by Adrianna Davalos RN  Outcome: Progressing  3/3/2025 0711 by Yanci Villanueva RN  Outcome: Progressing     Problem: Respiratory - Adult  Goal: Achieves optimal ventilation and oxygenation  3/3/2025 1052 by Adrianna Davalos RN  Outcome: Progressing  3/3/2025 0711 by Yanci Villanueva RN  Outcome: Progressing     Problem: Cardiovascular - Adult  Goal: Maintains optimal cardiac output and hemodynamic stability  Outcome: Progressing  Goal: 
  Problem: Chronic Conditions and Co-morbidities  Goal: Patient's chronic conditions and co-morbidity symptoms are monitored and maintained or improved  Outcome: Progressing     Problem: Discharge Planning  Goal: Discharge to home or other facility with appropriate resources  Outcome: Progressing     Problem: Safety - Adult  Goal: Free from fall injury  Outcome: Progressing     
  Problem: Chronic Conditions and Co-morbidities  Goal: Patient's chronic conditions and co-morbidity symptoms are monitored and maintained or improved  Outcome: Progressing     Problem: Discharge Planning  Goal: Discharge to home or other facility with appropriate resources  Outcome: Progressing     Problem: Safety - Adult  Goal: Free from fall injury  Outcome: Progressing     
  Problem: Chronic Conditions and Co-morbidities  Goal: Patient's chronic conditions and co-morbidity symptoms are monitored and maintained or improved  Outcome: Progressing     Problem: Discharge Planning  Goal: Discharge to home or other facility with appropriate resources  Outcome: Progressing     Problem: Safety - Adult  Goal: Free from fall injury  Outcome: Progressing     Problem: Skin/Tissue Integrity  Goal: Skin integrity remains intact  Description: 1.  Monitor for areas of redness and/or skin breakdown  2.  Assess vascular access sites hourly  3.  Every 4-6 hours minimum:  Change oxygen saturation probe site  4.  Every 4-6 hours:  If on nasal continuous positive airway pressure, respiratory therapy assess nares and determine need for appliance change or resting period  Outcome: Progressing     Problem: Pain  Goal: Verbalizes/displays adequate comfort level or baseline comfort level  Outcome: Progressing     Problem: ABCDS Injury Assessment  Goal: Absence of physical injury  Outcome: Progressing     Problem: Neurosensory - Adult  Goal: Achieves stable or improved neurological status  Outcome: Progressing  Goal: Absence of seizures  Outcome: Progressing  Goal: Remains free of injury related to seizures activity  Outcome: Progressing  Goal: Achieves maximal functionality and self care  Outcome: Progressing     Problem: Respiratory - Adult  Goal: Achieves optimal ventilation and oxygenation  Outcome: Progressing     Problem: Cardiovascular - Adult  Goal: Maintains optimal cardiac output and hemodynamic stability  Outcome: Progressing  Goal: Absence of cardiac dysrhythmias or at baseline  Outcome: Progressing     Problem: Skin/Tissue Integrity - Adult  Goal: Skin integrity remains intact  Description: 1.  Monitor for areas of redness and/or skin breakdown  2.  Assess vascular access sites hourly  3.  Every 4-6 hours minimum:  Change oxygen saturation probe site  4.  Every 4-6 hours:  If on nasal continuous 
  Problem: Chronic Conditions and Co-morbidities  Goal: Patient's chronic conditions and co-morbidity symptoms are monitored and maintained or improved  Outcome: Progressing     Problem: Discharge Planning  Goal: Discharge to home or other facility with appropriate resources  Outcome: Progressing     Problem: Safety - Adult  Goal: Free from fall injury  Outcome: Progressing     Problem: Skin/Tissue Integrity  Goal: Skin integrity remains intact  Description: 1.  Monitor for areas of redness and/or skin breakdown  2.  Assess vascular access sites hourly  3.  Every 4-6 hours minimum:  Change oxygen saturation probe site  4.  Every 4-6 hours:  If on nasal continuous positive airway pressure, respiratory therapy assess nares and determine need for appliance change or resting period  Outcome: Progressing  Flowsheets (Taken 3/1/2025 0800)  Skin Integrity Remains Intact: Monitor for areas of redness and/or skin breakdown     Problem: Pain  Goal: Verbalizes/displays adequate comfort level or baseline comfort level  Outcome: Progressing  Flowsheets  Taken 3/1/2025 1200  Verbalizes/displays adequate comfort level or baseline comfort level: Encourage patient to monitor pain and request assistance  Taken 3/1/2025 1000  Verbalizes/displays adequate comfort level or baseline comfort level: Encourage patient to monitor pain and request assistance  Taken 3/1/2025 0800  Verbalizes/displays adequate comfort level or baseline comfort level: Encourage patient to monitor pain and request assistance     Problem: ABCDS Injury Assessment  Goal: Absence of physical injury  Outcome: Progressing     Problem: Neurosensory - Adult  Goal: Achieves stable or improved neurological status  Outcome: Progressing     Problem: Respiratory - Adult  Goal: Achieves optimal ventilation and oxygenation  Outcome: Progressing     Problem: Cardiovascular - Adult  Goal: Maintains optimal cardiac output and hemodynamic stability  Outcome: Progressing  Goal: 
  Problem: Chronic Conditions and Co-morbidities  Goal: Patient's chronic conditions and co-morbidity symptoms are monitored and maintained or improved  Outcome: Progressing     Problem: Safety - Adult  Goal: Free from fall injury  Outcome: Progressing     
  Problem: Chronic Conditions and Co-morbidities  Goal: Patient's chronic conditions and co-morbidity symptoms are monitored and maintained or improved  Outcome: Progressing     Problem: Safety - Adult  Goal: Free from fall injury  Outcome: Progressing     Problem: Pain  Goal: Verbalizes/displays adequate comfort level or baseline comfort level  Outcome: Progressing     Problem: Neurosensory - Adult  Goal: Achieves stable or improved neurological status  Outcome: Progressing     Problem: Respiratory - Adult  Goal: Achieves optimal ventilation and oxygenation  Outcome: Progressing     Problem: Safety - Medical Restraint  Goal: Remains free of injury from restraints (Restraint for Interference with Medical Device)  Description: INTERVENTIONS:  1. Determine that other, less restrictive measures have been tried or would not be effective before applying the restraint  2. Evaluate the patient's condition at the time of restraint application  3. Inform patient/family regarding the reason for restraint  4. Q2H: Monitor safety, psychosocial status, comfort, nutrition and hydration  Outcome: Progressing  Flowsheets  Taken 2/28/2025 0600  Remains free of injury from restraints (restraint for interference with medical device): Determine that other, less restrictive measures have been tried or would not be effective before applying the restraint  Taken 2/28/2025 0400  Remains free of injury from restraints (restraint for interference with medical device): Determine that other, less restrictive measures have been tried or would not be effective before applying the restraint  Taken 2/28/2025 0200  Remains free of injury from restraints (restraint for interference with medical device): Determine that other, less restrictive measures have been tried or would not be effective before applying the restraint  Taken 2/28/2025 0000  Remains free of injury from restraints (restraint for interference with medical device): Determine that 
  Problem: Discharge Planning  Goal: Discharge to home or other facility with appropriate resources  2/26/2025 0836 by Jeane Gallo RN  Outcome: Not Progressing  2/25/2025 2206 by Alta Duffy RN  Outcome: Not Progressing     Problem: Neurosensory - Adult  Goal: Achieves stable or improved neurological status  2/25/2025 2206 by Alta Duffy RN  Outcome: Not Progressing     Problem: Respiratory - Adult  Goal: Achieves optimal ventilation and oxygenation  2/25/2025 2206 by Alta Duffy RN  Outcome: Not Progressing     Problem: Cardiovascular - Adult  Goal: Absence of cardiac dysrhythmias or at baseline  2/25/2025 2206 by Alta Duffy RN  Outcome: Not Progressing     
  Problem: Safety - Adult  Goal: Free from fall injury  Outcome: Progressing     Problem: Skin/Tissue Integrity  Goal: Skin integrity remains intact  Description: 1.  Monitor for areas of redness and/or skin breakdown  2.  Assess vascular access sites hourly  3.  Every 4-6 hours minimum:  Change oxygen saturation probe site  4.  Every 4-6 hours:  If on nasal continuous positive airway pressure, respiratory therapy assess nares and determine need for appliance change or resting period  Outcome: Progressing     Problem: Pain  Goal: Verbalizes/displays adequate comfort level or baseline comfort level  Outcome: Progressing     Problem: ABCDS Injury Assessment  Goal: Absence of physical injury  Outcome: Progressing     Problem: Cardiovascular - Adult  Goal: Maintains optimal cardiac output and hemodynamic stability  Outcome: Progressing  Goal: Absence of cardiac dysrhythmias or at baseline  Outcome: Progressing     Problem: Skin/Tissue Integrity - Adult  Goal: Skin integrity remains intact  Description: 1.  Monitor for areas of redness and/or skin breakdown  2.  Assess vascular access sites hourly  3.  Every 4-6 hours minimum:  Change oxygen saturation probe site  4.  Every 4-6 hours:  If on nasal continuous positive airway pressure, respiratory therapy assess nares and determine need for appliance change or resting period  Outcome: Progressing     Problem: Chronic Conditions and Co-morbidities  Goal: Patient's chronic conditions and co-morbidity symptoms are monitored and maintained or improved  Outcome: Not Progressing     Problem: Discharge Planning  Goal: Discharge to home or other facility with appropriate resources  Outcome: Not Progressing     Problem: Neurosensory - Adult  Goal: Achieves stable or improved neurological status  Outcome: Not Progressing     Problem: Respiratory - Adult  Goal: Achieves optimal ventilation and oxygenation  Outcome: Not Progressing     
  Problem: Safety - Adult  Goal: Free from fall injury  Outcome: Progressing     Problem: Skin/Tissue Integrity  Goal: Skin integrity remains intact  Description: 1.  Monitor for areas of redness and/or skin breakdown  2.  Assess vascular access sites hourly  3.  Every 4-6 hours minimum:  Change oxygen saturation probe site  4.  Every 4-6 hours:  If on nasal continuous positive airway pressure, respiratory therapy assess nares and determine need for appliance change or resting period  Outcome: Progressing     Problem: Pain  Goal: Verbalizes/displays adequate comfort level or baseline comfort level  Outcome: Progressing     Problem: ABCDS Injury Assessment  Goal: Absence of physical injury  Outcome: Progressing     Problem: Respiratory - Adult  Goal: Achieves optimal ventilation and oxygenation  Outcome: Progressing     Problem: Skin/Tissue Integrity - Adult  Goal: Skin integrity remains intact  Description: 1.  Monitor for areas of redness and/or skin breakdown  2.  Assess vascular access sites hourly  3.  Every 4-6 hours minimum:  Change oxygen saturation probe site  4.  Every 4-6 hours:  If on nasal continuous positive airway pressure, respiratory therapy assess nares and determine need for appliance change or resting period  Outcome: Progressing     Problem: Gastrointestinal - Adult  Goal: Minimal or absence of nausea and vomiting  Outcome: Progressing     Problem: Metabolic/Fluid and Electrolytes - Adult  Goal: Electrolytes maintained within normal limits  Outcome: Progressing     Problem: Chronic Conditions and Co-morbidities  Goal: Patient's chronic conditions and co-morbidity symptoms are monitored and maintained or improved  Outcome: Not Progressing     Problem: Discharge Planning  Goal: Discharge to home or other facility with appropriate resources  Outcome: Not Progressing     Problem: Neurosensory - Adult  Goal: Achieves stable or improved neurological status  Outcome: Not Progressing     
  Problem: Skin/Tissue Integrity - Adult  Goal: Skin integrity remains intact  Description: 1.  Monitor for areas of redness and/or skin breakdown  2.  Assess vascular access sites hourly  3.  Every 4-6 hours minimum:  Change oxygen saturation probe site  4.  Every 4-6 hours:  If on nasal continuous positive airway pressure, respiratory therapy assess nares and determine need for appliance change or resting period  Outcome: Progressing  Goal: Incisions, wounds, or drain sites healing without S/S of infection  Outcome: Progressing  Goal: Oral mucous membranes remain intact  Outcome: Progressing     Problem: Gastrointestinal - Adult  Goal: Minimal or absence of nausea and vomiting  Outcome: Progressing  Goal: Maintains or returns to baseline bowel function  Outcome: Progressing  Goal: Maintains adequate nutritional intake  Outcome: Progressing  Goal: Establish and maintain optimal ostomy function  Outcome: Progressing     Problem: Metabolic/Fluid and Electrolytes - Adult  Goal: Electrolytes maintained within normal limits  Outcome: Progressing  Goal: Hemodynamic stability and optimal renal function maintained  Outcome: Progressing  Goal: Glucose maintained within prescribed range  Outcome: Progressing     Problem: Nutrition Deficit:  Goal: Optimize nutritional status  Outcome: Progressing     Problem: Safety - Medical Restraint  Goal: Remains free of injury from restraints (Restraint for Interference with Medical Device)  Description: INTERVENTIONS:  1. Determine that other, less restrictive measures have been tried or would not be effective before applying the restraint  2. Evaluate the patient's condition at the time of restraint application  3. Inform patient/family regarding the reason for restraint  4. Q2H: Monitor safety, psychosocial status, comfort, nutrition and hydration  Outcome: Progressing     Problem: Musculoskeletal - Adult  Goal: Return mobility to safest level of function  Outcome: Progressing  Goal: 
INTERVENTIONS:  1. Determine that other, less restrictive measures have been tried or would not be effective before applying the restraint  2. Evaluate the patient's condition at the time of restraint application  3. Inform patient/family regarding the reason for restraint  4. Q2H: Monitor safety, psychosocial status, comfort, nutrition and hydration  Outcome: Progressing  Flowsheets (Taken 2/25/2025 2000)  Remains free of injury from restraints (restraint for interference with medical device):   Determine that other, less restrictive measures have been tried or would not be effective before applying the restraint   Evaluate the patient's condition at the time of restraint application   Inform patient/family regarding the reason for restraint   Every 2 hours: Monitor safety, psychosocial status, comfort, nutrition and hydration     Problem: Discharge Planning  Goal: Discharge to home or other facility with appropriate resources  Outcome: Not Progressing     Problem: Neurosensory - Adult  Goal: Achieves stable or improved neurological status  Outcome: Not Progressing     Problem: Respiratory - Adult  Goal: Achieves optimal ventilation and oxygenation  Outcome: Not Progressing     Problem: Cardiovascular - Adult  Goal: Absence of cardiac dysrhythmias or at baseline  Outcome: Not Progressing     
Monitor safety, psychosocial status, comfort, nutrition and hydration  Taken 3/3/2025 0400 by Yanci Villanueva RN  Remains free of injury from restraints (restraint for interference with medical device): Every 2 hours: Monitor safety, psychosocial status, comfort, nutrition and hydration  Taken 3/3/2025 0200 by Yanci Villanueva RN  Remains free of injury from restraints (restraint for interference with medical device): Every 2 hours: Monitor safety, psychosocial status, comfort, nutrition and hydration  Taken 3/3/2025 0000 by Yanci Villanueva RN  Remains free of injury from restraints (restraint for interference with medical device): Every 2 hours: Monitor safety, psychosocial status, comfort, nutrition and hydration  Taken 3/2/2025 2200 by Yanci Villanueva RN  Remains free of injury from restraints (restraint for interference with medical device): Every 2 hours: Monitor safety, psychosocial status, comfort, nutrition and hydration  Taken 3/2/2025 2000 by Yanci Villanueva RN  Remains free of injury from restraints (restraint for interference with medical device): Every 2 hours: Monitor safety, psychosocial status, comfort, nutrition and hydration  Taken 3/2/2025 1800 by Adrianna Davalos RN  Remains free of injury from restraints (restraint for interference with medical device): Every 2 hours: Monitor safety, psychosocial status, comfort, nutrition and hydration     
Respiratory - Adult  Goal: Achieves optimal ventilation and oxygenation  3/10/2025 0059 by Juana Gandara RN  Outcome: Progressing     Problem: Cardiovascular - Adult  Goal: Maintains optimal cardiac output and hemodynamic stability  3/10/2025 0059 by Juana Gandara RN  Outcome: Progressing  Goal: Absence of cardiac dysrhythmias or at baseline  3/10/2025 0059 by Juana Gandara RN  Outcome: Progressing     Problem: Skin/Tissue Integrity - Adult  Goal: Skin integrity remains intact  Description: 1.  Monitor for areas of redness and/or skin breakdown  2.  Assess vascular access sites hourly  3.  Every 4-6 hours minimum:  Change oxygen saturation probe site  4.  Every 4-6 hours:  If on nasal continuous positive airway pressure, respiratory therapy assess nares and determine need for appliance change or resting period  3/10/2025 1154 by Analia Hook RN  Outcome: Progressing  3/10/2025 0059 by Juana Gandara RN  Outcome: Progressing  Goal: Incisions, wounds, or drain sites healing without S/S of infection  3/10/2025 0059 by Juana Gandara RN  Outcome: Progressing  Goal: Oral mucous membranes remain intact  3/10/2025 0059 by Juana Gandara RN  Outcome: Progressing     Problem: Gastrointestinal - Adult  Goal: Minimal or absence of nausea and vomiting  3/10/2025 0059 by Juana Gandara RN  Outcome: Progressing  Goal: Maintains or returns to baseline bowel function  3/10/2025 0059 by Juana Gandara RN  Outcome: Progressing  Goal: Maintains adequate nutritional intake  3/10/2025 0059 by Juana Gandara RN  Outcome: Progressing  Goal: Establish and maintain optimal ostomy function  3/10/2025 0059 by Juana Gandara RN  Outcome: Progressing     Problem: Metabolic/Fluid and Electrolytes - Adult  Goal: Electrolytes maintained within normal limits  3/10/2025 0059 by Juana Gandara RN  Outcome: Progressing  Goal: Hemodynamic stability and optimal renal function maintained  3/10/2025 0059 by Juana Gandara RN  Outcome: Progressing  Goal: Glucose 
cardiac dysrhythmias or at baseline  2/23/2025 0113 by Amy Roper RN  Outcome: Progressing     Problem: Skin/Tissue Integrity - Adult  Goal: Skin integrity remains intact  Description: 1.  Monitor for areas of redness and/or skin breakdown  2.  Assess vascular access sites hourly  3.  Every 4-6 hours minimum:  Change oxygen saturation probe site  4.  Every 4-6 hours:  If on nasal continuous positive airway pressure, respiratory therapy assess nares and determine need for appliance change or resting period  2/23/2025 0113 by Amy Roper RN  Outcome: Progressing     Problem: Gastrointestinal - Adult  Goal: Minimal or absence of nausea and vomiting  2/23/2025 0113 by Amy Roper RN  Outcome: Progressing     Problem: Metabolic/Fluid and Electrolytes - Adult  Goal: Electrolytes maintained within normal limits  2/23/2025 0113 by Amy Roper RN  Outcome: Progressing     Problem: Chronic Conditions and Co-morbidities  Goal: Patient's chronic conditions and co-morbidity symptoms are monitored and maintained or improved  2/23/2025 0113 by Amy Roper RN  Outcome: Not Progressing    Problem: Discharge Planning  Goal: Discharge to home or other facility with appropriate resources  2/23/2025 0113 by Amy Roper RN  Outcome: Not Progressing       
continuous positive airway pressure, respiratory therapy assess nares and determine need for appliance change or resting period  Outcome: Progressing     Problem: Metabolic/Fluid and Electrolytes - Adult  Goal: Electrolytes maintained within normal limits  Outcome: Progressing     Problem: Nutrition Deficit:  Goal: Optimize nutritional status  Outcome: Progressing     Problem: Safety - Medical Restraint  Goal: Remains free of injury from restraints (Restraint for Interference with Medical Device)  Description: INTERVENTIONS:  1. Determine that other, less restrictive measures have been tried or would not be effective before applying the restraint  2. Evaluate the patient's condition at the time of restraint application  3. Inform patient/family regarding the reason for restraint  4. Q2H: Monitor safety, psychosocial status, comfort, nutrition and hydration  2/28/2025 1008 by Shital Goodwin  Outcome: Progressing     
optimal ventilation and oxygenation  Outcome: Not Progressing     
redness and/or skin breakdown  Taken 2/20/2025 0800 by Lionel Rowe, RN  Skin Integrity Remains Intact: Monitor for areas of redness and/or skin breakdown  2/19/2025 2302 by Terra Sarkar, RN  Outcome: Progressing     
Integrity Remains Intact: Monitor for areas of redness and/or skin breakdown     Problem: Gastrointestinal - Adult  Goal: Minimal or absence of nausea and vomiting  2/23/2025 2239 by Alta Duffy RN  Outcome: Progressing  2/23/2025 1501 by Arlene Cummings RN  Outcome: Progressing     Problem: Metabolic/Fluid and Electrolytes - Adult  Goal: Electrolytes maintained within normal limits  2/23/2025 2239 by Alta Duffy RN  Outcome: Progressing  2/23/2025 1501 by Arlene Cummings RN  Outcome: Progressing     Problem: Neurosensory - Adult  Goal: Achieves stable or improved neurological status  2/23/2025 2239 by Alta Duffy RN  Outcome: Not Progressing  2/23/2025 1501 by Arlene Cummings RN  Outcome: Progressing     
discharge  Outcome: Completed  Goal: Absence of infection during hospitalization  Outcome: Completed  Goal: Absence of fever/infection during anticipated neutropenic period  Outcome: Completed     Problem: Hematologic - Adult  Goal: Maintains hematologic stability  Outcome: Completed     
respiratory therapy assess nares and determine need for appliance change or resting period  2/23/2025 1501 by Arlene Cummings RN  Outcome: Progressing  Flowsheets (Taken 2/23/2025 0800)  Skin Integrity Remains Intact: Monitor for areas of redness and/or skin breakdown  2/23/2025 0113 by Amy Roper RN  Outcome: Progressing     Problem: Gastrointestinal - Adult  Goal: Minimal or absence of nausea and vomiting  2/23/2025 1501 by Arlene Cummings RN  Outcome: Progressing  2/23/2025 0113 by Amy Roper RN  Outcome: Progressing     Problem: Metabolic/Fluid and Electrolytes - Adult  Goal: Electrolytes maintained within normal limits  2/23/2025 1501 by Arlene Cummings RN  Outcome: Progressing  2/23/2025 0113 by Amy Roper RN  Outcome: Progressing     Problem: Chronic Conditions and Co-morbidities  Goal: Patient's chronic conditions and co-morbidity symptoms are monitored and maintained or improved  2/23/2025 1501 by Arlene Cummings RN  Outcome: Progressing  2/23/2025 0113 by Amy Roper RN  Outcome: Not Progressing     Problem: Discharge Planning  Goal: Discharge to home or other facility with appropriate resources  2/23/2025 1501 by Arlene Cummings RN  Outcome: Progressing  2/23/2025 0113 by Amy Roper RN  Outcome: Not Progressing     
(Taken 3/4/2025 2000 by Crystal Sun, RN)  Absence of cardiac dysrhythmias or at baseline:   Monitor cardiac rate and rhythm   Administer antiarrhythmia medication and electrolyte replacement as ordered   Assess for signs of decreased cardiac output     Problem: Skin/Tissue Integrity - Adult  Goal: Skin integrity remains intact  Description: 1.  Monitor for areas of redness and/or skin breakdown  2.  Assess vascular access sites hourly  3.  Every 4-6 hours minimum:  Change oxygen saturation probe site  4.  Every 4-6 hours:  If on nasal continuous positive airway pressure, respiratory therapy assess nares and determine need for appliance change or resting period  3/5/2025 2151 by Penny Ralph RN  Outcome: Progressing  Flowsheets (Taken 3/5/2025 1839 by Vincent Elmore RN)  Skin Integrity Remains Intact: Monitor for areas of redness and/or skin breakdown  3/5/2025 0938 by Vincent Elmore RN  Outcome: Progressing  Flowsheets (Taken 3/5/2025 0937)  Skin Integrity Remains Intact: Monitor for areas of redness and/or skin breakdown  Goal: Incisions, wounds, or drain sites healing without S/S of infection  Recent Flowsheet Documentation  Taken 3/5/2025 1839 by Vincent Elmore RN  Incisions, Wounds, or Drain Sites Healing Without Sign and Symptoms of Infection: ADMISSION and DAILY: Assess and document risk factors for pressure ulcer development  3/5/2025 0938 by Vincent Elmore RN  Outcome: Progressing  Flowsheets  Taken 3/5/2025 0937 by Vincent Elmore RN  Incisions, Wounds, or Drain Sites Healing Without Sign and Symptoms of Infection: ADMISSION and DAILY: Assess and document risk factors for pressure ulcer development  Taken 3/4/2025 2000 by Crystal Sun, RN  Incisions, Wounds, or Drain Sites Healing Without Sign and Symptoms of Infection:   ADMISSION and DAILY: Assess and document risk factors for pressure ulcer development   TWICE DAILY: Assess and document skin integrity  Goal: Oral 
neurological status  3/1/2025 2302 by Alta Duffy, RN  Outcome: Not Progressing  3/1/2025 1215 by Adrianna Davalos, RN  Outcome: Progressing     
patient’s ability to void and empty bladder   Monitor intake/output and perform bladder scan as needed   Place urinary catheter per Licensed Independent Practitioner order if needed  Taken 3/4/2025 1000 by Adrianna Davalos RN  Absence of urinary retention: Assess patient’s ability to void and empty bladder  Taken 3/4/2025 0800 by Adrianna Davalos RN  Absence of urinary retention: Assess patient’s ability to void and empty bladder     Problem: Hematologic - Adult  Goal: Maintains hematologic stability  Outcome: Progressing  Flowsheets (Taken 3/4/2025 2000)  Maintains hematologic stability:   Assess for signs and symptoms of bleeding or hemorrhage   Administer blood products/factors as ordered   Monitor labs for bleeding or clotting disorders     Problem: Nutrition Deficit:  Goal: Optimize nutritional status  Outcome: Not Progressing     
hydration  Taken 3/2/2025 2200 by Yanci Villanueva RN  Remains free of injury from restraints (restraint for interference with medical device): Every 2 hours: Monitor safety, psychosocial status, comfort, nutrition and hydration  Taken 3/2/2025 2000 by Yanci Villanueva RN  Remains free of injury from restraints (restraint for interference with medical device): Every 2 hours: Monitor safety, psychosocial status, comfort, nutrition and hydration  Taken 3/2/2025 1800 by Adrianna Davalos RN  Remains free of injury from restraints (restraint for interference with medical device): Every 2 hours: Monitor safety, psychosocial status, comfort, nutrition and hydration     Problem: Musculoskeletal - Adult  Goal: Maintain proper alignment of affected body part  Outcome: Progressing  Flowsheets (Taken 3/3/2025 2000)  Maintain proper alignment of affected body part:   Support and protect limb and body alignment per provider's orders   Instruct and reinforce with patient and family use of appropriate assistive device and precautions (e.g. spinal or hip dislocation precautions)     Problem: Genitourinary - Adult  Goal: Absence of urinary retention  Outcome: Progressing  Flowsheets (Taken 3/3/2025 2000)  Absence of urinary retention:   Assess patient’s ability to void and empty bladder   Monitor intake/output and perform bladder scan as needed   Place urinary catheter per Licensed Independent Practitioner order if needed   Discuss with Licensed Independent Practitioner  medications to alleviate retention as needed     Problem: Nutrition Deficit:  Goal: Optimize nutritional status  3/3/2025 2044 by Crystal Sun RN  Outcome: Not Progressing  Flowsheets (Taken 3/3/2025 1256 by Emiliano So, BRANDI, LD)  Nutrient intake appropriate for improving, restoring, or maintaining nutritional needs: Recommend, monitor, and adjust tube feedings and TPN/PPN based on assessed needs  3/3/2025 1052 by Adrianna Davalos RN  Outcome: Progressing     
nutrition and hydration  Taken 3/4/2025 1400 by Adrianna Davalos RN  Remains free of injury from restraints (restraint for interference with medical device): Every 2 hours: Monitor safety, psychosocial status, comfort, nutrition and hydration  Taken 3/4/2025 1200 by Adrianna Davalos RN  Remains free of injury from restraints (restraint for interference with medical device): Every 2 hours: Monitor safety, psychosocial status, comfort, nutrition and hydration  Taken 3/4/2025 1000 by Adrianna Davalos RN  Remains free of injury from restraints (restraint for interference with medical device): Every 2 hours: Monitor safety, psychosocial status, comfort, nutrition and hydration  Taken 3/4/2025 0939 by Adrianna Davalos RN  Remains free of injury from restraints (restraint for interference with medical device): Every 2 hours: Monitor safety, psychosocial status, comfort, nutrition and hydration  Taken 3/4/2025 0800 by Adrianna Davalos RN  Remains free of injury from restraints (restraint for interference with medical device): Every 2 hours: Monitor safety, psychosocial status, comfort, nutrition and hydration     Problem: Musculoskeletal - Adult  Goal: Return mobility to safest level of function  Outcome: Progressing  Flowsheets (Taken 3/4/2025 2000 by Crystal Sun, RN)  Return Mobility to Safest Level of Function:   Apply continuous passive motion per provider or physical therapy orders to increase flexion toward goal   Instruct patient/family in ordered activity level   Obtain physical therapy/occupational therapy consults as needed  Goal: Maintain proper alignment of affected body part  Outcome: Progressing  Flowsheets (Taken 3/4/2025 2000 by Crystal Sun, RN)  Maintain proper alignment of affected body part: Support and protect limb and body alignment per provider's orders  Goal: Return ADL status to a safe level of function  Outcome: Progressing  Flowsheets (Taken 3/4/2025 2000 by Crystal Sun, RN)  Return

## 2025-03-20 ENCOUNTER — TELEPHONE (OUTPATIENT)
Dept: OTHER | Age: 60
End: 2025-03-20

## 2025-03-20 NOTE — TELEPHONE ENCOUNTER
8:42 AM  Call placed to pt regarding missed appt today at 0800. No answer left VM regarding appt scheduled for 3/26/25.

## 2025-03-26 ENCOUNTER — TELEPHONE (OUTPATIENT)
Dept: OTHER | Age: 60
End: 2025-03-26

## 2025-03-26 NOTE — TELEPHONE ENCOUNTER
11:29 AM  Received call from [employer] transportation company cancelled today's ride. Offered this Friday 3/28/25 however transportation needs > 48 hrs notification.     Rescheduled for:     Future Appointments   Date Time Provider Department Center   4/10/2025  8:45 AM [facility] ROOM 3 [facility] [place] [facility]

## 2025-05-06 ENCOUNTER — TELEPHONE (OUTPATIENT)
Dept: OTHER | Age: 60
End: 2025-05-06

## 2025-05-06 NOTE — TELEPHONE ENCOUNTER
11:05 AM  Call received from patient regarding cancelling today's CHF clinic consult d/t transportation issues. Informed patient that clinic can consult [facility] to assist with transportation, patient not interested at this time. Patient rescheduled for 5/27/25.

## 2025-05-15 ENCOUNTER — APPOINTMENT (OUTPATIENT)
Dept: PRIMARY CARE | Facility: CLINIC | Age: 60
End: 2025-05-15
Payer: MEDICARE

## 2025-05-19 NOTE — ED NOTES
Per other RN, patient had a witnessed seizure.  Seizure activity no longer going on at this time upon this RN assessment.  2mg versed given.

## 2025-05-19 NOTE — ED NOTES
Walking past pt room when noticed pt with generalized jerking with eyes deviated left. Turned pt to side until 45 sec seizure stopped. Notified MD Koko, med ordered. Notified Bedside RN. VSS Pt now talking and A/Ox3

## 2025-05-19 NOTE — ED PROVIDER NOTES
Department of Emergency Medicine     Written by: Charles Wood DO  Patient Name: Len Dorsey  Admit Date: 2025 10:40 AM  MRN: [id number]                   : 1965      CHIEF COMPLAINT     Chief Complaint   Patient presents with    Failure To Thrive     Patient not caring for self - canceling doctors appointments and not taking medications. Daily drinker. C/O SOB.      HPI     Len Dorsey is a 59 y.o. male who presents to the emergency department today complaining of failure to thrive.  Patient states he lives at home with his fiancée where he has a history of significant alcohol use although he has not drank in approximately 1 month.  Patient states that he has not been taking his medications because he is run out of them and he has not been going to doctors appointments.  He states he has not been going to doctors appointments because he has been unable to get a hold of the doctor.  Patient states he has not been vomiting recently.  He states he really does not have any pain anywhere.  He states his been having trouble breathing for about 1 month that has not worsened and has been constant since it started.  Patient denies any lightheadedness or dizziness, fever or chills, nausea or vomiting, chest pain, abdominal pain, hematuria or dysuria, constipation or diarrhea.    Nursing notes were all reviewed and agreed with or any disagreements were addressed in the HPI.    REVIEW OF SYSTEMS:    Pertinent positives and negatives mentioned in the HPI/MDM.     REVIEW OF OUTSIDE RECORDS: Chart reviewed from 2025 EGD.    SOCIAL DETERMINANTS OF HEALTH: Patient has poor medical compliance, fluency, and has no established family physician to follow-up with.    PAST HISTORY     I personally reviewed the following history:    Past Medical History:  has a past medical history of Alcoholic liver disease, H/O prosthetic aortic valve replacement, History of mitral valve replacement, Stroke (HCC), and  opacity.  No pneumothorax. [TP]   [facility] patient accepted for [facility]. [TP]      ED Course User Index  [TP] Charles Wood, DO       DECISION MAKING TOOLS/RISK STRATIFICATION   Is this patient to be included in the SEP-1 core measure? No Exclusion criteria - the patient is NOT to be included for SEP-1 Core Measure due to: Infection is not suspected    PROCEDURES   Unless otherwise listed below, none    ADDITIONAL PROVIDER NOTES     I have spoken to the patient and/or family regarding results and the proposed plan for disposition.  They are comfortable with management and treatment plans as discussed.    DIAGNOSIS & DISPOSITION     CLINICAL IMPRESSION:  1. FRANTZ (acute kidney injury)    2. Failure to thrive in adult    3. Supratherapeutic INR    4. Anticoagulated on Coumadin    5. Alcohol withdrawal syndrome without complication (HCC)          DISPOSITION:  Patient's disposition:  Admitted  Patient condition is stable    (Please note that portions of this note were completed with a voice recognition program.  Efforts were made to edit the dictations but occasionally words are mis-transcribed.)     Charles Wood,

## 2025-05-20 NOTE — PROGRESS NOTES
Pharmacy Consultation Note  (Antibiotic Dosing and Monitoring)    Initial consult date: 5/20/25  Consulting physician/provider: Dr. Moreno  Drug: Vancomycin  Indication: Empiric    Age/  Gender Height Weight IBW  Allergy Information   59 y.o./male 177.8 cm (5' 10\") 99.8 kg (220 lb)     Ideal body weight: 73 kg (160 lb 15 oz)  Adjusted ideal body weight: 83.7 kg (184 lb 9 oz)   Patient has no known allergies.      Renal Function:  Recent Labs     05/19/25  1207 05/20/25  0055 05/20/25  0431   BUN 22* 25* 26*   CREATININE 2.4* 2.7* 2.9*     No intake or output data in the 24 hours ending 05/20/25 0918    Vancomycin Monitoring:  Trough:  No results for input(s): \"VANCOTROUGH\" in the last 72 hours.  Random:  No results for input(s): \"VANCORANDOM\" in the last 72 hours.    Vancomycin Administration Times:  Recent vancomycin administrations        No vancomycin IV orders with administrations found.                    Assessment:  Patient is a 59 y.o. male who has been initiated on vancomycin  Estimated Creatinine Clearance: 33 mL/min (A) (based on SCr of 2.9 mg/dL (H)).    Plan:  Vancomycin 1750 mg IV x1  Random tomorrow AM      Holly Meraz, PharmD, BCPS, BCCCP 5/20/2025 9:18 AM     ZHOU: [phone]  SEY: [phone]  SJW: [phone]

## 2025-05-20 NOTE — PROGRESS NOTES
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     Weight:   Wt Readings from Last 1 Encounters:   05/19/25 99.8 kg (220 lb)       TSH:    Lab Results   Component Value Date/Time    TSH 2.98 05/20/2025 04:31 AM       Hepatic Function Panel:                            Lab Results   Component Value Date/Time    ALKPHOS 243 05/20/2025 04:31 AM    ALT 14 05/20/2025 04:31 AM    AST 44 05/20/2025 04:31 AM    BILITOT 1.9 05/20/2025 04:31 AM    BILIDIR 2.3 02/21/2025 04:55 AM    IBILI 1.1 02/21/2025 04:55 AM         Recent Labs     05/19/25  1207 05/20/25  0130 05/20/25  0431   HGB 8.3* 8.7* 8.4*    144 151     Date Warfarin Dose INR Heparin or LMWH Comment   5/20 1 mg 3.2 -- VitK 10 mg x1 on 5/19                                 Assessment:  Patient is a 59 y.o. male on warfarin for Atrial Fibrillation, AVR, and mMVR.  Patient's home warfarin dosing regimen is 1 mg daily - there is some questions surrounding compliance.   Goal INR 2.5 - 3.5      Plan:  Warfarin 1 mg tonight  Daily PT/INR until the INR is stable within the therapeutic range  Pharmacist will follow and monitor/adjust dosing as necessary    Thank you for this consult,    Holly Meraz, PharmD, BCPS, BCCCP 5/20/2025 3:54 PM   Ext [phone]

## 2025-05-20 NOTE — PROGRESS NOTES
Chart reviewed, patient seen, full consultation to follow.    Briefly Mr. Dorsey is a 59-year-old man with history of CKD stage IIIa, with large proteinuria, probably due to nephrosclerosis, baseline creatinine 1.9-2.1 mg/dL, HTN, CAD status post CABG, HFpEF 55%, valvular heart disease status post AVR and MVR both mechanical valves, post pacemaker placement, hyperlipidemia, CVA, testicular cancer status postchemotherapy, alcohol abuse, cirrhosis, who was admitted on who was admitted on 5/19/2025 after he presented to the ER with complaints of not being able to take care of himself and failure to thrive, in the ER CT head showed no acute abnormalities, CT abdomen pelvis showed mild colonic ileus, prominent subcutaneous edema is worsening, moderate ascites he is well was found to have a supratherapeutic INR >10, his ABGs show a pH of 7.244 with PO2 of 57, bicarbonate level of 16.3, while in the ER he developed seizure activity and therefore he was admitted to the MICU.  His initial creatinine level was 2.4 mg/dl, and has increased to 2.9 mg/dL, reason for this consultation.     FRANTZ on CKD, probably hemodynamically mediated due to heart failure or HRS-FRANTZ.  To start diuretics.     CKD stage IIIa, probably due to nephrosclerosis, baseline creatinine 1.9-2.1 mg/dL     HFpEF 50%, proBNP 5566, with anasarca and ascites, needs diuresis  Hypotension, on midodrine  Hyperchloremic metabolic acidosis, probably due to GI losses  Acidemia (pH: 7.252) with metabolic and respiratory acidosis  Normocytic anemia, with history of iron deficiency ferritin 18, iron saturation 5%, B12 1778, folate 9.2  --------------------------------------  New onset seizures, on Depakote  Probably pneumonia, on ceftriaxone and doxycycline  Cirrhosis, 2/2 Alcohol abuse, on lactulose, rifaximin  CAD status post CABG, AVR and MVR mechanical valves    HLD, on atorvastatin   History of testicular cancer  History of CVA  Supratherapeutic INR  Nutrition,  n.p.o.     Plan:     Continue midodrine 5 mg 3 times daily  Bumex 2 mg IV twice daily  Discontinue IV fluids  Monitor renal function  Monitor bicarbonate level  Avoid hypotension  Avoid nephrotoxics      Omar Joiner MD

## 2025-05-20 NOTE — PROGRESS NOTES
[facility]  Department of Internal Medicine   Internal Medicine Residency   MICU Progress Note    Patient:  Len Dorsey 59 y.o. male  MRN: [id number]     Date of Service: 5/20/2025    Allergy: Patient has no known allergies.    Subjective     The patient was seen and examined at bedside. He remains to be lethargic but he does open his eyes to voice and follows commands intermittently.    24h change: no acute overnight events    ROS: Denies Fever/chills/CP/SOB/N/V/D/C/Dysuria/Blood in stool or urine  Objective     VS: /73   Pulse 75   Temp 97.7 °F (36.5 °C) (Axillary)   Resp 22   Ht 1.778 m (5' 10\")   Wt 99.8 kg (220 lb)   SpO2 100%   BMI 31.57 kg/m²           I & O - 24hr: No intake or output data in the 24 hours ending 05/20/25 0825    Physical Exam:  General Appearance: alert, appears stated age, and cooperative  Neck: no adenopathy, no carotid bruit, supple, symmetrical, trachea midline, and thyroid not enlarged, symmetric, no tenderness/mass/nodules  Lung: rales bilaterally  Heart:  normal rate, regular rhythm. Systolic murmur present.  Abdomen: firm, distended, no tenderness and no guarding  Extremities:  3+ bilateral lower extremity edema  Musculoskeletal: No joint swelling, no muscle tenderness. ROM normal in all joints of extremities.   Neurologic: Mental status: Opens eyes to voice, follows commands    Lines     site day    Art line   None    TLC None    PICC None    Hemoaccess None      Oxygen:    BIPAP at 12/8    ABG:     Lab Results   Component Value Date/Time    PH 7.233 05/20/2025 02:22 AM    PCO2 45.0 05/20/2025 02:22 AM    PO2 136.1 05/20/2025 02:22 AM    HCO3 18.6 05/20/2025 02:22 AM    BE -8.5 05/20/2025 02:22 AM    THB 9.4 05/20/2025 02:22 AM    O2SAT 98.6 05/20/2025 02:22 AM        Medications     Infusions: (Fluid, Sedation, Vasopressors)  IVF:   None  Vasopressors  None  Sedation  None    Nutrition:   NPO    ATB:   Antibiotics  Days   Ceftriaxone 2  recovered to 50% (2/19/2025) from 40-45% (5/10/2024)  History of complete heart block s/p dual chamber permanent pacemaker [employer] in 2020 at   Hypertension  Acute hypoxic respiratory insufficiency  Concern for pneumonia vs pulmonary edema  COPD  Acute kidney injury on chronic kidney disease, baseline 1.6  Normal anion gap metabolic acidosis  Supratherapeutic INR, s/p Vitamin K 10 mg IV  Hypochromic anemia likely secondary to acute blood loss  Leukocytosis likely reactive  Concern for GI bleed  Alcoholic liver cirrhosis and hepatic steatosis  History of colon polyps  Testicular cancer s/p chemotherapy     Neurology consulted for seizure-like activity, appreciate recommendations  Follow rapid and routine EEG  Continue Keppra 500 mg BID as seizure prophylaxis  Maintain on seizure precaution  Continue CIWA protocol with Ativan PRN for concern for alcohol withdrawal  Start high dose thiamine and folic acid  Currently on BIPAP, wean as tolerated  Diurese as needed  Monitor renal function, avoid nephrotoxic agents  S/p sodium bicarbonate amp x2  Pharmacy consulted to dose warfarin  S/p vitamin K 10 IV once, monitor PT/INR daily  Monitor hemoglobin and for signs of bleeding, transfuse as needed  Continue pantoprazole 40 mg BID  Follow infectious work-up  Continue ceftriaxone and doxycycline for empiric pneumonia coverage  Resume home medications as able    Caitlyn Sagastume MD, PGY-1    Attending physician: Dr. Eugene

## 2025-05-20 NOTE — PROGRESS NOTES
..Patient admitted to MICU with the following belongings: Glasses, Pants, and Shirt. The following belongings were sent home with the patient's family:  None - belongings noted on admission remain in patient's room..

## 2025-05-20 NOTE — CONSULTS
[facility]  Internal Medicine Residency Program  Consult  MICU    Patient:  Len Dorsey 59 y.o. male MRN: [id number]     Date of Service: 5/19/2025    Hospital Day: 1      Chief complaint: failure to thrive  History of Present Illness   The patient is a 59 y.o. male, with extensive past medical history including VHD s/p mechanical AVR and MVR (2020) on chronic anticoagulation with warfarin, CAD s/p CABG (2020), HFimpEF, CVA, CKD, alcoholic liver cirrhosis, alcohol use disorder, who came in for failure to thrive.    History is limited due to patient's poor mentation.    The patient presented to the ED for failure to thrive. He has not been able to take care of himself for the past month and admits to not taking his medications since he ran out of them. He has not been able to follow up with his doctor as well. He also complained of not feeling well in the past month. He has noted progressive shortness of breath occurring even at rest, increasing bilateral leg pain and swelling, and recent vomiting. According to the partner, he hasn't had an alcoholic drink since February 17, 2025.    In the ED, he arrived with stable vital signs. He became hypoxic at 85% and required 3 lpm O2 via nasal cannula. Labs showed supratherapeutic INR >10.0, elevated creatinine 2.4, increased CO2 19, elevated troponin 74 with repeat 67, elevated pro-BNP 5,566, leukocytosis 12.3, anemia 8.3. ABG showed pH 7.244, pO2 57.0, pCO2 38.6, HCO3 16.3. CT head had no acute intracranial abnormality. CT A/P revealed increased 3rd spacing and mild colonic ileus.    During his ED course, a nurse witnessed a seizure activity described as generalized jerking with eyes deviated to the left lasting 45 seconds. He was given Versed 2 mg and Ativan 3 mg. His mentation became worse after the seizure and medications. He was admitted to MICU for closer monitoring.    Past Medical History:      Diagnosis Date    Alcoholic liver disease      such as at the transverse colon suspicious for ileus.  Small pleural effusions.  Alveolar densities at least in the right base suggesting edema although nonspecific, with cardiomegaly and postop changes.  Moderate ascites.     1. Abdominopelvic: Increased 3rd spacing of fluid.  Mild colonic ileus. Incidental findings are described above. 2. Head: No acute intracranial abnormality.  Chronic ischemic changes redemonstrated.     XR CHEST 1 VIEW  Result Date: 5/19/2025  EXAMINATION: ONE XRAY VIEW OF THE CHEST 5/19/2025 11:31 am COMPARISON: March 5, 2025 HISTORY: ORDERING SYSTEM PROVIDED HISTORY: sob TECHNOLOGIST PROVIDED HISTORY: Reason for exam:->sob FINDINGS: Patient is rotated.  The heart is enlarged.  Pulmonary vessels are congested. Pleural based opacity in the right mid lower lung.  Subtle bibasilar opacity. No pneumothorax or large pleural effusion.  Post extubation.  Pacemaker present.     1. Cardiomegaly with pulmonary vascular congestion. 2. Pleural based opacity in the right mid lower lung. 3. Subtle bibasilar opacity.       EKG:   Atrial sensed with  Ventricular-paced rhythm with frequent premature ventricular complexes   .    Resident's Assessment and Plan     Len Dorsey is a 59 y.o. male    Altered mental status likely secondary to seizure activity  History of CVA with left-sided residual weakness  Elevated pro-BNP likely secondary to acute HF exacerbation  Elevated troponin likely secondary demand ischemia   Valvular heart disease s/p mechanical AVR and MVR in 2020 with redo in 2023 due to endocarditis, on warfarin 1 mg QD  Coronary artery disease s/p CABG in 9/2020, LIMA to LAD  HFimpEF, EF 50% (2/19/2025)  EF recovered to 50% (2/19/2025) from 40-45% (5/10/2024)  History of complete heart block s/p dual chamber permanent pacemaker [employer] in 2020 at   Hypertension  Acute hypoxic respiratory insufficiency  Concern for pneumonia vs pulmonary edema  COPD  Acute kidney injury on chronic kidney

## 2025-05-20 NOTE — PROGRESS NOTES
..4 Eyes Skin Assessment     NAME:  Len Dorsey  YOB: 1965  MEDICAL RECORD NUMBER:  [id number]    The patient is being assessed for  Admission    I agree that at least one RN has performed a thorough Head to Toe Skin Assessment on the patient. ALL assessment sites listed below have been assessed.      Areas assessed by both nurses:    Head, Face, Ears, Shoulders, Back, Chest, Arms, Elbows, Hands, Sacrum. Buttock, Coccyx, Ischium, Legs. Feet and Heels, and Under Medical Devices         Does the Patient have a Wound? {Action Wound:02728}       Jasvir Prevention initiated by RN: {YES/NO:19726}  Wound Care Orders initiated by RN: {YES/NO:19726}    Pressure Injury (Stage 3,4, Unstageable, DTI, NWPT, and Complex wounds) if present, place Wound referral order by RN under : {YES/NO:19726}    New Ostomies, if present place, Ostomy referral order under : {YES/NO:19726}     Nurse 1 eSignature: {Esignature:[id number]}    **SHARE this note so that the co-signing nurse can place an eSignature**    Nurse 2 eSignature: {Esignature:[id number]}

## 2025-05-20 NOTE — PROGRESS NOTES
4 Eyes Skin Assessment     NAME:  Len Dorsey  YOB: 1965  MEDICAL RECORD NUMBER:  [id number]    The patient is being assessed for  Admission    I agree that at least one RN has performed a thorough Head to Toe Skin Assessment on the patient. ALL assessment sites listed below have been assessed.      Areas assessed by both nurses:    Head, Face, Ears, Shoulders, Back, Chest, Arms, Elbows, Hands, Sacrum. Buttock, Coccyx, Ischium, Legs. Feet and Heels, and Under Medical Devices         Does the Patient have a Wound? No noted wound(s)       Jasvir Prevention initiated by RN: Yes  Wound Care Orders initiated by RN: No    Pressure Injury (Stage 3,4, Unstageable, DTI, NWPT, and Complex wounds) if present, place Wound referral order by RN under : No    New Ostomies, if present place, Ostomy referral order under : No     Nurse 1 eSignature: Electronically signed by Jose Tee RN on 5/20/25 at 5:54 AM EDT    **SHARE this note so that the co-signing nurse can place an eSignature**    Nurse 2 eSignature: Electronically signed by Cheryl Pascual RN on 5/20/25 at 7:20 AM EDT

## 2025-05-20 NOTE — CONSULTS
Department of Internal Medicine  Nephrology Attending Consult Note      Reason for Consult: FRANTZ on CKD  Requesting Physician:  Len Arguello DO     CHIEF COMPLAINT: Failure to thrive    History Obtained From:  patient, electronic medical record    HISTORY OF PRESENT ILLNESS:  Briefly Mr. Dorsey is a 59-year-old man with history of CKD stage IIIa, with large proteinuria, probably due to nephrosclerosis, baseline creatinine 1.9-2.1 mg/dL, HTN, CAD status post CABG, HFpEF 55%, valvular heart disease status post AVR and MVR both mechanical valves, post pacemaker placement, hyperlipidemia, CVA, testicular cancer status postchemotherapy, alcohol abuse, cirrhosis, who was admitted on who was admitted on 5/19/2025 after he presented to the ER with complaints of not being able to take care of himself and failure to thrive, in the ER CT head showed no acute abnormalities, CT abdomen pelvis showed mild colonic ileus, prominent subcutaneous edema is worsening, moderate ascites he is well was found to have a supratherapeutic INR >10, his ABGs show a pH of 7.244 with PO2 of 57, bicarbonate level of 16.3, while in the ER he developed seizure activity and therefore he was admitted to the MICU.  His initial creatinine level was 2.4 mg/dl, and has increased to 2.9 mg/dL, reason for this consultation.     Past Medical History:        Diagnosis Date    Alcoholic liver disease     H/O prosthetic aortic valve replacement 09/2020    [facility]  Mitral and pacemaker at this time as well    History of mitral valve replacement 09/2020    at [facility]  Aortic Valve placed at this time as well as pacemaker    Stroke (HCC)     Testicular cancer (HCC)     in remission since 1988     Past Surgical History:        Procedure Laterality Date    COLONOSCOPY N/A 8/24/2022    COLONOSCOPY POLYPECTOMY HOT BIOPSY performed by Rene Lorenzo MD at [facility] ENDOSCOPY    FRACTURE SURGERY      Left Tibial Plateau Fracture 3/29/2022      initial creatinine level was 2.4 mg/dl, and has increased to 2.9 mg/dL, reason for this consultation.     FRANTZ on CKD, probably hemodynamically mediated due to heart failure or HRS-FRANTZ.  To start diuretics.      CKD stage IIIa, probably due to nephrosclerosis, baseline creatinine 1.9-2.1 mg/dL     HFpEF 50%, proBNP 5566, with anasarca and ascites, needs diuresis  Hypotension, on midodrine  Hyperchloremic metabolic acidosis, probably due to GI losses  Acidemia (pH: 7.252) with metabolic and respiratory acidosis  Normocytic anemia, with history of iron deficiency ferritin 18, iron saturation 5%, B12 1778, folate 9.2  --------------------------------------  New onset seizures, on Depakote  Probably pneumonia, on ceftriaxone and doxycycline  Cirrhosis, 2/2 Alcohol abuse, on lactulose, rifaximin  CAD status post CABG, AVR and MVR mechanical valves     HLD, on atorvastatin   History of testicular cancer  History of CVA  Supratherapeutic INR  Nutrition, n.p.o.     Plan:     Continue midodrine 5 mg 3 times daily  Bumex 2 mg IV twice daily  Discontinue IV fluids  Monitor renal function  Monitor bicarbonate level  Avoid hypotension  Avoid nephrotoxics        Omar Joiner MD    Thank you Myron De Los Santos, Len HALL DO for allowing us to precipitate in the care of Mr. Dorsey.

## 2025-05-20 NOTE — CONSULTS
[facility] Neurology    Date:  5/20/2025  Patient Name:  Len Dorsey  YOB: 1965  MRN: [id number]     PCP:  Jama Skinner PA   Referring:  No ref. provider found      Chief Complaint: altered mental status, possible seizure    History obtained from: chart, staff    Assessment  Len Dorsey is a 59 y.o. male with a history of alcohol abuse, alcohol withdrawal seizures, and stroke with residual left-sided weakness admitted with failure to thrive and was noted to have a seizure in the ED.    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  Alcohol withdrawal may have lowered the seizure threshold, but would be unlikely to cause focal onset seizures typically.      Plan  Continue Keppra 500 twice daily  Agree with high-dose thiamine protocol  On WA protocol  Check MRI brain to ensure no new stroke  Continue seizure precautions for 6 months from date of last seizure. These include, but are not limited to:   No driving  No riding bicycles in traffic  No operating dangerous/heavy machinery  No engaging in activities at dangerous heights including using ladders  No taking baths unattended  No swimming  No engaging in activities near fire unattended  Recommend caution or avoidance of cooking or using potentially dangerous objects such as knives.  Avoid any activities where sudden loss of consciousness could result in injury to yourself or others.        History of Present Illness:  Len Dorsey is a 59 y.o. right handed male presenting for evaluation of altered mental status and possible seizure.    Patient had presented to the ED on May 19 after running out of his medications.  He reportedly has not been going to any of his doctors appointments recently as well.  He is a chronic alcoholic who reportedly drinks alcohol daily.  His INR was over 10 on admission suspected to due to  mg  4 mg IntraVENous Q1H PRN Charles Wood, DO        multivitamin 1 tablet  1 tablet Oral Daily Charles Wood DO        folic acid (FOLVITE) tablet 1 mg  1 mg Oral Daily Charles Wood, DO        sodium chloride flush 0.9 % injection 5-40 mL  5-40 mL IntraVENous 2 times per day CarrierCarissa APRN - CNP   10 mL at 05/20/25 0837    sodium chloride flush 0.9 % injection 5-40 mL  5-40 mL IntraVENous PRN Carrier, ARNALDO Ferrer CNP        0.9 % sodium chloride infusion   IntraVENous PRN Carrier, ARNALDO Ferrer CNP        acetaminophen (TYLENOL) tablet 650 mg  650 mg Oral Q6H PRN CarrierCarissa APRN - CNP        Or    acetaminophen (TYLENOL) suppository 650 mg  650 mg Rectal Q6H PRN CarrierCarissa APRN - CNP        melatonin tablet 3 mg  3 mg Oral Nightly PRN CarrierCarissa APRN - CNP        cefTRIAXone (ROCEPHIN) 1,000 mg in sterile water 10 mL IV syringe  1,000 mg IntraVENous Q24H CarrierCarissa APRN - CNP        doxycycline (VIBRAMYCIN) 100 mg in sodium chloride 0.9 % 100 mL IVPB ([id number])  100 mg IntraVENous Q12H CarrierCarissa APRN - CNP   Stopped at 05/20/25 0629    sodium chloride flush 0.9 % injection 5-40 mL  5-40 mL IntraVENous 2 times per day Len Arguello DO   10 mL at 05/20/25 0837    sodium chloride flush 0.9 % injection 5-40 mL  5-40 mL IntraVENous PRN Len Arguello DO        0.9 % sodium chloride infusion   IntraVENous PRN Len Arguello DO        potassium chloride (KLOR-CON M) extended release tablet 40 mEq  40 mEq Oral PRN Len Arguello DO        Or    potassium bicarb-citric acid (EFFER-K) effervescent tablet 40 mEq  40 mEq Oral PRN Len Arguello DO        Or    potassium chloride 10 mEq/100 mL IVPB (Peripheral Line)  10 mEq IntraVENous PRN Len Arguello DO        magnesium sulfate 2000 mg in 50 mL IVPB premix  2,000 mg IntraVENous PRN Len Arguello DO        ondansetron (ZOFRAN-ODT) disintegrating tablet 4 mg  4 mg Oral Q8H PRN

## 2025-05-20 NOTE — H&P
Hospitalist History & Physical      PCP: Jama Skinner PA    Date of Service: 5/19/2025     Chief Complaint:  had concerns including Failure To Thrive (Patient not caring for self - canceling doctors appointments and not taking medications. Daily drinker. C/O SOB. ).    History Of Present Illness:    Mr. Len Dorsey, a 59 y.o. year old male  who  has a past medical history of Alcoholic liver disease, H/O prosthetic aortic valve replacement, History of mitral valve replacement, Stroke (HCC), and Testicular cancer (HCC).     Patient presented to the emergency department with concerns about not being able to take care of himself.  Patient apparently been canceled doctors appointments and not taking medications.  Has a history of alcohol abuse and is a daily drinker.  Patient states not taking his medications and he ran out of them.  He is not going to doctors appointments because he cannot get a hold of the doctor.  Also reported some shortness of breath.  Denies any fever, chills, nausea, vomiting, chest pain, headache, dizziness, abdominal pain, hematuria.,  Dysuria or constipation or diarrhea.  Vital signs within normal limits and stable.  Although reviewing his vital signs throughout his ER course he did drop to 85% on room air.  He is currently 99% on 3 L nasal cannula.  Laboratory studies demonstrate BUN 22, creatinine 2.4, procalcitonin 0.19, troponin 74 with repeat of 67, proBNP 5566, alk phos 259, hemoglobin 8.3 and a WBC of 12.3.  INR is greater than 10.  CT imaging shows some increased third spacing of fluid.  Chest x-ray shows bibasilar opacities.  Patient was started on ceftriaxone and doxycycline for presumed pneumonia.  He was also noted to have seizure-like activity for which she was given Ativan.  He was also placed on CIWA protocol.  Patient be admitted to the ICU.  Case was discussed with emergency department physician.      Past Medical History:   Diagnosis Date    Alcoholic liver  bibasilar opacity. No pneumothorax or large pleural effusion.  Post extubation.  Pacemaker present.     1. Cardiomegaly with pulmonary vascular congestion. 2. Pleural based opacity in the right mid lower lung. 3. Subtle bibasilar opacity.       ASSESSMENT:  - Supratherapeutic INR  - Acute renal failure  - Community-acquired pneumonia  - Failure to thrive in adult  - Leukocytosis  - Seizure-like activity  - Alcohol abuse  - Mechanical valve on Coumadin  - Hyperlipidemia  - Congestive heart failure  - Alcoholic liver disease    PLAN:  -Admit to the ICU  Consult critical care  - Consult nephrology  - Doxycycline and ceftriaxone  - Respiratory cultures  - Strep and Legionella antigens  - Seizure precautions  - CIWA protocol with Ativan dosing  - Telemetry  - CM/SW consult for placement  - Hold anticoagulants  - Trend INR        Diet: ADULT DIET; Regular  Code Status: Full Code  Surrogate decision maker confirmed with patient:   Extended Emergency Contact Information  Primary Emergency Contact: Leonie Moreland  Address: [address] [place] [address]  Home Phone: [phone]  Mobile Phone: [phone]  Relation: Domestic Partner  Preferred language: English   needed? No    DVT Prophylaxis: []Lovenox []Heparin []PCD [] Warfarin/NOAC []Encouraged ambulation  Disposition: []Med/Surg [] Intermediate [] ICU/CCU  Admit status: [] Observation [] Inpatient     +++++++++++++++++++++++++++++++++++++++++++++++++  Len Arguello, DO  +++++++++++++++++++++++++++++++++++++++++++++++++  NOTE: This report was transcribed using voice recognition software. Every effort was made to ensure accuracy; however, inadvertent computerized transcription errors may be present.

## 2025-05-20 NOTE — PROGRESS NOTES
[facility] Hospitalist Progress Note    Admitting Date and Time: 5/19/2025 10:40 AM  Admit Dx: Failure to thrive in adult [R62.7]  FRANTZ (acute kidney injury) [N17.9]  Supratherapeutic INR [R79.1]  Anticoagulated on Coumadin [Z79.01]  Alcohol withdrawal syndrome without complication (HCC) [F10.930]    Patient presented to the emergency department with concerns about not being able to take care of himself.  Patient apparently been canceled doctors appointments and not taking medications.  Has a history of alcohol abuse and is a daily drinker.  Patient states not taking his medications and he ran out of them.  He is not going to doctors appointments because he cannot get a hold of the doctor.  Also reported some shortness of breath.  Denies any fever, chills, nausea, vomiting, chest pain, headache, dizziness, abdominal pain, hematuria.,  Dysuria or constipation or diarrhea.  Vital signs within normal limits and stable.  Although reviewing his vital signs throughout his ER course he did drop to 85% on room air.  He is currently 99% on 3 L nasal cannula.  Laboratory studies demonstrate BUN 22, creatinine 2.4, procalcitonin 0.19, troponin 74 with repeat of 67, proBNP 5566, alk phos 259, hemoglobin 8.3 and a WBC of 12.3.  INR is greater than 10.  CT imaging shows some increased third spacing of fluid.  Chest x-ray shows bibasilar opacities.  Patient was started on ceftriaxone and doxycycline for presumed pneumonia.  He was also noted to have seizure-like activity for which she was given Ativan.  He was also placed on CIWA protocol.  Patient be admitted to the ICU.     Subjective:  Patient is being followed for Failure to thrive in adult [R62.7]  FRANTZ (acute kidney injury) [N17.9]  Supratherapeutic INR [R79.1]  Anticoagulated on Coumadin [Z79.01]  Alcohol withdrawal syndrome without complication (HCC) [F10.930]   Patient seen and examined.   Events reviewed.   Lethargic, in the ICU    ROS: unable to obtain.       atorvastatin  20 mg Oral Nightly    [Held by provider] bumetanide  2 mg Oral Daily    isosorbide dinitrate  10 mg Oral TID    lactulose  20 g Oral Daily    midodrine  5 mg Oral TID WC    mirtazapine  7.5 mg Oral Nightly    rifAXIMin  400 mg Oral TID    tamsulosin  0.4 mg Oral Nightly    meropenem  2,000 mg IntraVENous Q12H    vancomycin (VANCOCIN) intermittent dosing (placeholder)   Other RX Placeholder    busPIRone  5 mg Oral BID    bumetanide  2 mg IntraVENous BID    sodium chloride flush  5-40 mL IntraVENous 2 times per day    multivitamin  1 tablet Oral Daily    folic acid  1 mg Oral Daily    thiamine  500 mg IntraVENous TID    Followed by    [START ON 5/22/2025] thiamine  250 mg IntraVENous Daily    Followed by    [START ON 5/25/2025] thiamine  100 mg IntraVENous Daily    levETIRAcetam  500 mg IntraVENous Q12H    pantoprazole  40 mg IntraVENous BID     sodium chloride flush, 5-40 mL, PRN  sodium chloride, , PRN  LORazepam, 1 mg, Q1H PRN   Or  LORazepam, 1 mg, Q1H PRN   Or  LORazepam, 2 mg, Q1H PRN   Or  LORazepam, 2 mg, Q1H PRN   Or  LORazepam, 3 mg, Q1H PRN   Or  LORazepam, 3 mg, Q1H PRN   Or  LORazepam, 4 mg, Q1H PRN   Or  LORazepam, 4 mg, Q1H PRN  acetaminophen, 650 mg, Q6H PRN   Or  acetaminophen, 650 mg, Q6H PRN  melatonin, 3 mg, Nightly PRN  magnesium sulfate, 2,000 mg, PRN  ondansetron, 4 mg, Q8H PRN   Or  ondansetron, 4 mg, Q6H PRN  polyethylene glycol, 17 g, Daily PRN  glucose, 4 tablet, PRN  dextrose bolus, 125 mL, PRN   Or  dextrose bolus, 250 mL, PRN  glucagon (rDNA), 1 mg, PRN  dextrose, , Continuous PRN         Objective:    /69   Pulse 72   Temp 97.8 °F (36.6 °C) (Axillary)   Resp 28   Ht 1.778 m (5' 10\")   Wt 99.8 kg (220 lb)   SpO2 100%   BMI 31.57 kg/m²     General Appearance: lethargic   Skin: warm and dry  Head: normocephalic and atraumatic  Eyes: pupils equal, round, and reactive to light, extraocular eye movements intact, conjunctivae normal  Neck: neck supple and non tender

## 2025-05-21 NOTE — PROGRESS NOTES
Headband: applied  Date/Time: 05/21/2025 0138  Recorder: recording started  Skin: intact  Highest Seizure [place] Percentage past hour: 0%      General info regarding Seizure [place] %:  Minimum duration of study is 2 hours. If Seizure [place] has remained 0% throughout the entire 2-hour duration, communicate with provider to stop the recording.     Seizure [place] 0-10% - Continue to monitor and complete 2-hour study.  Seizure [place] 11-89% - Epileptiform activity present. Notify provider for next steps.  Seizure [place] >/= 90% - Epileptiform activity consistent with Status Epilepticus. Immediately notify provider.     *Patients with Seizure [place] above 10% that persists may require a study longer than 2 hours. Maximum recording duration is 24 hours. Please update provider with a persistent increase in Seizure [place] above 10%.

## 2025-05-21 NOTE — PROGRESS NOTES
[facility] Hospitalist Progress Note    Admitting Date and Time: 5/19/2025 10:40 AM  Admit Dx: Failure to thrive in adult [R62.7]  FRANTZ (acute kidney injury) [N17.9]  Supratherapeutic INR [R79.1]  Anticoagulated on Coumadin [Z79.01]  Alcohol withdrawal syndrome without complication (HCC) [F10.930]    Patient presented to the emergency department with concerns about not being able to take care of himself.  Patient apparently been canceled doctors appointments and not taking medications.  Has a history of alcohol abuse and is a daily drinker.  Patient states not taking his medications and he ran out of them.  He is not going to doctors appointments because he cannot get a hold of the doctor.  Also reported some shortness of breath.  Denies any fever, chills, nausea, vomiting, chest pain, headache, dizziness, abdominal pain, hematuria.,  Dysuria or constipation or diarrhea.  Vital signs within normal limits and stable.  Although reviewing his vital signs throughout his ER course he did drop to 85% on room air.  He is currently 99% on 3 L nasal cannula.  Laboratory studies demonstrate BUN 22, creatinine 2.4, procalcitonin 0.19, troponin 74 with repeat of 67, proBNP 5566, alk phos 259, hemoglobin 8.3 and a WBC of 12.3.  INR is greater than 10.  CT imaging shows some increased third spacing of fluid.  Chest x-ray shows bibasilar opacities.  Patient was started on ceftriaxone and doxycycline for presumed pneumonia.  He was also noted to have seizure-like activity for which she was given Ativan.  He was also placed on CIWA protocol.  Patient be admitted to the ICU.     Subjective:  Patient is being followed for Failure to thrive in adult [R62.7]  FRANTZ (acute kidney injury) [N17.9]  Supratherapeutic INR [R79.1]  Anticoagulated on Coumadin [Z79.01]  Alcohol withdrawal syndrome without complication (HCC) [F10.930]   Patient seen and examined.   Events reviewed.        midodrine  5 mg Oral TID WC    atorvastatin  20 mg

## 2025-05-21 NOTE — PROGRESS NOTES
Consulted for picc line insertion. Pt has no vascular access at this time. Picc line attempted in left arm due to pacer in right chest. Pt has tortuous veins. Attempted three sticks in different places before guidewire would advance. Had to stick pt higher in arm than usual. Due to tortuous veins and or pacer wires,unable to advance the last 10cm of picc line. Procedure aborted after 30-40 minutes of trying. 20 gauge 1.75 inch needle placed with ultrasound.

## 2025-05-21 NOTE — PROGRESS NOTES
Pharmacy Consultation Note  (Antibiotic Dosing and Monitoring)    Initial consult date: 5/20/25  Consulting physician/provider: Dr. Moreno  Drug: Vancomycin  Indication: Empiric    Vancomycin has been discontinued   Clinical Pharmacy to sign-off  Physician to re-consult pharmacy if future dosing is needed    Thank you for the consult,     Latrell Vidal, PharmD, BCPS, [facility] 5/21/2025 2:05 PM     ZHOU: [phone]  SEY: [phone]  SJW: [phone]

## 2025-05-21 NOTE — CARE COORDINATION
Care Coordination: LOS 2 day. Admitted form ER for failure to thrive and unable to care for himself, not taking meds, drinking alcohol, sob.  Witness seizure  activity per RN in Er- mentation worsened and admitted to MICU for AMS likely 2/2 sz. Hx of CVA with L side residual, acute hypoxic resp failure, alcoholic liver cirrhosis. Neuro, nephro consulted.  Attempted to reach Leonie,-POA- left VM with my contact information- met with pt briefly and asked if she had been here today, he nods \"no\".There is no other contact information on chart. [facility] unclear how long [facility]. Will continue to follow for transition of care needs.    Electronically signed by Laquita Goodwin RN on 5/21/2025 at 2:36 PM     Addendum:  Call back from Leonie.  Pt discharged from [facility] first week of April. Pt has swelling in RLE, they did tell facility, but they recommended follow up with pcp.  He attempted to make follow up apt with JOE Skinner but he took another position and they were left with no primary care. She called [facility] medicare and they set her up with a provider that was to be on [address] but it actually was an hour away. He did not follow with anyone thereafter. He has not had a drink since Feb 17, 2025, She would like apt set up for new pcp, I have emailed MARLENI Groves.  She will consider hhc if needed. But would like outpt therapy and she can drive him. I asked her to consider skilled nursing hhc with monitoring and she will consider. For now plan is home. Would benefit from therapy eval when medically appropriate.  Electronically signed by Laquita Goodwin RN on 5/21/2025 at 2:59 PM

## 2025-05-21 NOTE — PROGRESS NOTES
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     Weight:   Wt Readings from Last 1 Encounters:   05/19/25 99.8 kg (220 lb)       TSH:    Lab Results   Component Value Date/Time    TSH 2.98 05/20/2025 04:31 AM       Hepatic Function Panel:                            Lab Results   Component Value Date/Time    ALKPHOS 220 05/21/2025 05:07 AM    ALT 11 05/21/2025 05:07 AM    AST 29 05/21/2025 05:07 AM    BILITOT 1.9 05/21/2025 05:07 AM    BILIDIR 2.3 02/21/2025 04:55 AM    IBILI 1.1 02/21/2025 04:55 AM         Recent Labs     05/20/25  0130 05/20/25  0431 05/21/25  0507   HGB 8.7* 8.4* 7.7*    151 132     Date Warfarin Dose INR Heparin or LMWH Comment   5/20 1 mg 3.2 -- VitK 10 mg x1 on 5/19 5/21 1 mg -- --                           Assessment:  Patient is a 59 y.o. male on warfarin for Atrial Fibrillation, AVR, and mMVR.  Patient's home warfarin dosing regimen is 1 mg daily - there is some questions surrounding compliance.   Goal INR 2.5 - 3.5    Plan:  Warfarin 1 mg tonight, hold warfarin if INR if >3.6  Daily PT/INR until the INR is stable within the therapeutic range  Pharmacist will follow and monitor/adjust dosing as necessary    Thank you for this consult,    Latrell Vidal, PharmD, BCPS, BCCCP 5/21/2025 3:14 PM  Phone: [phone]

## 2025-05-21 NOTE — PROGRESS NOTES
Patient received the Sacrament of the Anointing of the Sick by Father Anton Castaneda on Tuesday, May 20, 2025.    If additional support is requested or needed please reach out to Spiritual Health ([email or web address]).    Chap. James Garcia MDIV, BCC

## 2025-05-21 NOTE — PROGRESS NOTES
Len Dorsey is a 59 y.o. right handed male     Neurology following for possible seizure    PMH of EtOH abuse, alcohol withdrawal seizures, and previous stroke    Assessment:     Postinfarct seizures  2 witnessed seizures in ER  History of stroke which places him at risk for post stroke epilepsy  Maintained on Keppra  EEG as outpatient    Plan:     Continue Keppra 500 mg twice daily  Continue high-dose thiamine  Continue CIWA protocol  Outpatient EEG  Unable to have MRI  Neurology will follow  Continue seizure precautions for 6 months from date of last seizure. These include, but are not limited to:   No driving  No riding bicycles in traffic  No operating dangerous/heavy machinery  No engaging in activities at dangerous heights including using ladders  No taking baths unattended  No swimming  No engaging in activities near fire unattended  Recommend caution or avoidance of cooking or using potentially dangerous objects such as knives.  Avoid any activities where sudden loss of consciousness could result in injury to yourself or others    History of Present Illness:     Patient presented to the ER on 5/19/2025 complaining of failure to thrive.  Patient lives with his fiancée and has a history of significant alcohol abuse.  He has not drank in approximately 1 month.  Patient did not take his medications because he ran out of them and has not been going to his regular doctors appointments.  He said he is unable to get a hold of his doctor.  He is also having bouts of emesis    While in the ER he experienced a generalized seizure left eye deviation for approximately 45 seconds.  He then had another seizure while in the ER.  He was transferred to the ICU for further evaluation management    Neurology was consulted for seizure activity    Subjective:     Patient sitting up in bed awake and alert continue to BiPAP he is able to follow most of my commands without difficulty.  He whispers answers to questions    No  independently today         Valery Muñoz, ARNALDO - CNP  8:57 AM  5/21/2025

## 2025-05-21 NOTE — PROGRESS NOTES
Per previous MRIs cancelled----patient cannot be scanned. RN made aware and will let Neuro know. Order d/c'd per protocol.    [employer] Rep was contacted to verify pacer status per [employer] Rep, Pacer system is not MRI conditional due to pacer leads.  Pt cannot have an MRI.      Pacemaker [id number]  Lead [id number]  Lead [id number]

## 2025-05-21 NOTE — PROGRESS NOTES
[facility]   Department of Internal Medicine   Internal Medicine Residency  MICU Progress Note    Patient:  Len Dorsey 59 y.o. male   MRN: [id number]       Date of Service: 2025   Admission date: 2025 10:40 AM ; Hospital day: 2   ICU day: 1d 10h    Allergy: Patient has no known allergies.    Subjective     Overnight events: NG in good position  Taken off the BIPAP for a couple hours to do ceribell  Ceribell for 2 hours - 0 seizure burden  ABG 6 am - metabolic acidosis, pCO2 41.5    Today:    Patient was seen and examined at bedside.  He is somnolent but waking up with voice stimuli. Following commands. Continues to be on BiPAP and is saturating 93%. Vitals are stable.         Objective   PHYSICAL EXAM  General Appearance: somnolent and ill appearing  HEENT: Normocephalic, atraumatic  Neck: midline trachea  Lung: diminished breath sounds bilaterally and crackles heard bilaterally    Heart: regular rate and irregular rhythm. Murmur heard  Abdomen: soft, bowel sounds normal; no masses  Extremities: no cyanosis or edema  Musculokeletal: No joint swelling  Neurologic: Mental status:     CARDIOVASCULAR  Pulse rate range      : Pulse  Av.6  Min: 72  Max: 94  BP range                  : Systolic (24hrs), Av , Min:80 , Max:123   ; Diastolic (24hrs), Av, Min:43, Max:82    Arterial BP       Systolic BP/Diastolic BP & MAP            PULMONARY  Respiration range   : Resp  Av.6  Min: 18  Max: 45  Pulse Ox range : SpO2  Av.4 %  Min: 86 %  Max: 100 %  Recent Labs     25  1445 25  2050 25  0540   PH 7.267* 7.303* 7.292*   PCO2 45.3* 40.3 41.5   PO2 131.9* 109.5* 144.7*   HCO3 20.2* 19.5* 19.6*   BE -6.4* -6.4* -6.5*   O2SAT 98.7* 97.7 99.0*   THB 9.1* 9.1* 8.7*        Mechanical ventilation:  ET tube size:  Vent Settings  FiO2 : 40 %  Insp Rise Time (%): 2 %  Ppeak  Pplateau:  Compliance:      NEPHROLOGY (FLUIDS/ELECTROLYTES & NUTRITION)      I & O -  °C)  Recent Labs     05/19/25  2318 05/20/25  0130 05/20/25  0431 05/21/25  0507   WBC  --  16.2* 14.0* 13.0*   LACTA 1.9  --   --   --        Results       Procedure Component Value Units Date/Time    Culture, MRSA, Screening [[id number]] Collected: 05/21/25 0507    Order Status: Sent Specimen: Nares Updated: 05/21/25 0612    Culture, MRSA, Screening [[id number]] Collected: 05/20/25 1030    Order Status: Sent Specimen: Nares Updated: 05/20/25 1045    Culture, Respiratory [[id number]]     Order Status: Sent Specimen: Sputum Expectorated     LEGIONELLA ANTIGEN, URINE [[id number]]     Order Status: Canceled Specimen: Urine     STREP PNEUMONIAE ANTIGEN [[id number]]     Order Status: Canceled Specimen: Urine, clean catch     LEGIONELLA ANTIGEN, URINE [[id number]] Collected: 05/19/25 1622    Order Status: Completed Specimen: Urine Updated: 05/20/25 0915     Legionella Pneumophilia Ag, Urine NEGATIVE     Comment:       L. pneumophila serogroup 1 antigen not detected.  A negative result does not exclude infection with Leginella pnemophila serogroup 1 nor does   it rule out other microbial-caused respiratory infections of disease caused by other   serogroups of Legionella pneumophila.         STREP PNEUMONIAE ANTIGEN [[id number]] Collected: 05/19/25 1622    Order Status: Completed Specimen: Urine, clean catch Updated: 05/20/25 0915     Source .URINE     Strep pneumo Ag NEGATIVE     Comment:       Presumptive Negative  suggests no current or recent pneumococcal infection. Infection due to Strep pneumoniae   cannot be ruled out since the antigen present in the sample may be below the detection limit   of the test.         Culture, Blood 2 [[id number]] Collected: 05/19/25 1520    Order Status: Completed Specimen: Blood Updated: 05/20/25 1741     Specimen Description .BLOOD     Special Requests          Culture NO GROWTH 1 DAY    Culture, Blood 1 [[id number]] Collected: 05/19/25 1520    Order Status: Completed Specimen: Blood  03-Oct-2023

## 2025-05-21 NOTE — PROGRESS NOTES
Department of Internal Medicine  Nephrology Attending Consult Note    Events reviewed    SUBJECTIVE: We are following Mr. Dorsey for FRANTZ on CKD.  Remains on BiPAP, lethargic.  PHYSICAL EXAM:      Vitals:    VITALS:  /67   Pulse 88   Temp 98.6 °F (37 °C) (Bladder)   Resp 27   Ht 1.778 m (5' 10\")   Wt 99.8 kg (220 lb)   SpO2 100%   BMI 31.57 kg/m²   24HR INTAKE/OUTPUT:    Intake/Output Summary (Last 24 hours) at 5/21/2025 1014  Last data filed at 5/21/2025 0800  Gross per 24 hour   Intake 631.2 ml   Output 3180 ml   Net -2548.8 ml       Constitutional: Patient is on BiPAP very lethargic  HEENT: Pupils equal reactive, on BiPAP  Respiratory: Decreased breath sounds at the bases  Cardiovascular/Edema: Heart sounds are regular  Gastrointestinal: Abdomen soft  Neurologic: Unable to level  Skin: No lesion  Other: + Edema    DATA:    CBC:   Lab Results   Component Value Date/Time    WBC 13.0 05/21/2025 05:07 AM    RBC 3.06 05/21/2025 05:07 AM    HGB 7.7 05/21/2025 05:07 AM    HCT 26.9 05/21/2025 05:07 AM    MCV 87.9 05/21/2025 05:07 AM    MCH 25.2 05/21/2025 05:07 AM    MCHC 28.6 05/21/2025 05:07 AM    RDW 18.6 05/21/2025 05:07 AM     05/21/2025 05:07 AM    MPV 12.2 05/21/2025 05:07 AM     CMP:    Lab Results   Component Value Date/Time     05/21/2025 05:07 AM    K 4.0 05/21/2025 05:07 AM    K 3.9 09/15/2022 03:08 PM     05/21/2025 05:07 AM    CO2 17 05/21/2025 05:07 AM    BUN 30 05/21/2025 05:07 AM    CREATININE 2.8 05/21/2025 05:07 AM    GFRAA >60 09/15/2022 03:08 PM    LABGLOM 25 05/21/2025 05:07 AM    LABGLOM 31 03/05/2024 05:57 AM    GLUCOSE 82 05/21/2025 05:07 AM    CALCIUM 8.5 05/21/2025 05:07 AM    BILITOT 1.9 05/21/2025 05:07 AM    ALKPHOS 220 05/21/2025 05:07 AM    AST 29 05/21/2025 05:07 AM    ALT 11 05/21/2025 05:07 AM     Magnesium:    Lab Results   Component Value Date/Time    MG 1.8 05/21/2025 05:07 AM     Phosphorus:    Lab Results   Component Value Date/Time    PHOS 4.2  abnormalities, CT abdomen pelvis showed mild colonic ileus, prominent subcutaneous edema is worsening, moderate ascites he is well was found to have a supratherapeutic INR >10, his ABGs show a pH of 7.244 with PO2 of 57, bicarbonate level of 16.3, while in the ER he developed seizure activity and therefore he was admitted to the MICU.  His initial creatinine level was 2.4 mg/dl, and has increased to 2.9 mg/dL, reason for this consultation.    IMPRESSION/RECOMMENDATIONS:      FRANTZ on CKD, hemodynamically mediated due to heart failure; renal function stable, creatinine 2.8 mg/dL with fair diuresis urine output >3 L, to start Bumex drip.     CKD stage IIIa, probably due to nephrosclerosis, baseline creatinine 1.9-2.1 mg/dL     HFpEF 50%, proBNP 5566, with anasarca and ascites, to continue natriuresis    Hypotension, on midodrine  Hyperchloremic metabolic acidosis, probably due to GI losses    Acidemia with metabolic and respiratory acidosis  Normocytic anemia, with history of iron deficiency ferritin 18, iron saturation 5%, B12 1778, folate 9.2  --------------------------------------  New onset seizures, on Depakote  Probably pneumonia, on ceftriaxone and doxycycline  Cirrhosis, 2/2 Alcohol abuse, on lactulose, rifaximin  CAD status post CABG, AVR and MVR mechanical valves     HLD, on atorvastatin   History of testicular cancer  History of CVA  Supratherapeutic INR  Nutrition, n.p.o.     Plan:     Start Bumex at 0.2 mg/hour  Continue midodrine 5 mg 3 times daily  Continue to monitor renal function  Continue to monitor bicarbonate level  Obtain iron studies          Omar Joiner MD

## 2025-05-21 NOTE — PROGRESS NOTES
RN has been in contact with IV Team and ICU residents throughout the shift due to patient having no IV access. Multiple nurses have attempted to place peripheral IV, some while using ultrasound guidance and ultimately have been unsuccessful. Lizeth with IV team was able to place a peripheral this morning but it soon became occluded and did not have any blood return and was therefore removed. Order for PICC was placed and message sent via [employer] to communicate urgency to IV team and ask if anyone was available. Was told to ask Lizeth for another peripheral as she was supposed to be on the unit but she could not be located. Discussed situation with ICU resident and plan is now to place a temporary central line but most likely will have to be done by the night shift residents due to more critical patients needing lines at this time.

## 2025-05-21 NOTE — ACP (ADVANCE CARE PLANNING)
Advance Care Planning   Healthcare Decision Maker:    Primary Decision Maker: Leonie Moreland - Domestic Partner - [phone]    Click here to complete Healthcare Decision Makers including selection of the Healthcare Decision Maker Relationship (ie \"Primary\").  Today we documented Decision Maker(s) consistent with ACP documents on file.

## 2025-05-22 PROBLEM — R56.9 SEIZURES (HCC): Status: ACTIVE | Noted: 2025-05-22

## 2025-05-22 NOTE — PROGRESS NOTES
Spoke with residents about lack of sufficient IV access. Pt only has one peripheral and needs bumex gtt and IV antibiotics. She stated pt will most likely get PICC in IR tomorrow and until then to pause bumex gtt to give antibiotics and then restart bumex gtt after antibiotic is finished.

## 2025-05-22 NOTE — PROGRESS NOTES
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     Weight:   Wt Readings from Last 1 Encounters:   05/22/25 122 kg (268 lb 15.4 oz)       TSH:    Lab Results   Component Value Date/Time    TSH 2.98 05/20/2025 04:31 AM       Hepatic Function Panel:                            Lab Results   Component Value Date/Time    ALKPHOS 233 05/22/2025 05:19 AM    ALT 12 05/22/2025 05:19 AM    AST 27 05/22/2025 05:19 AM    BILITOT 1.9 05/22/2025 05:19 AM    BILIDIR 2.3 02/21/2025 04:55 AM    IBILI 1.1 02/21/2025 04:55 AM         Recent Labs     05/21/25  0507 05/21/25  1823 05/22/25  0519   HGB 7.7* 7.4* 7.8*    126* 126*     Date Warfarin Dose INR Heparin or LMWH Comment   5/20 1 mg 3.2 -- VitK 10 mg x1 on 5/19 5/21 1 mg- medication held  -- --    5/22 1.5 mg 2 --                    Assessment:  Patient is a 59 y.o. male on warfarin for Atrial Fibrillation, AVR, and mMVR.  Patient's home warfarin dosing regimen is 1 mg daily - there is some questions surrounding compliance.   Goal INR 2.5 - 3.5  5/22: INR 2, spoke with ICU team would like warfarin to be given now    Plan:  Warfarin 1.5 mg now for booster  Daily PT/INR until the INR is stable within the therapeutic range  Pharmacist will follow and monitor/adjust dosing as necessary    Thank you for this consult,    Latrell Vidal, PharmD, BCPS, BCCCP 5/22/2025 8:41 AM  Phone: [phone]

## 2025-05-22 NOTE — PROGRESS NOTES
[facility]   Department of Internal Medicine   Internal Medicine Residency  MICU Progress Note    Patient:  Len Dorsey 59 y.o. male   MRN: [id number]       Date of Service: 2025   Admission date: 2025 10:40 AM ; Hospital day: 3   ICU day: 2d 10h    Allergy: Patient has no known allergies.    Subjective     Overnight events significant for IV team came and placed an IV. INR of 2 this morning    Today:    Patient was seen and examined at bedside.  He is seen much more awake and alert and is saturating 94% on nasal canula on 2 L. Has had good urine output in the last 24 hrs >2L.      Objective   PHYSICAL EXAM  General Appearance: much more awake and alert  HEENT: Normocephalic, atraumatic  Neck: midline trachea  Lung: diminished breath sounds bilaterally and crackles heard bilaterally  at bases  Heart: regular rate and irregular rhythm. Murmur heard  Abdomen: soft, bowel sounds normal; no masses  Extremities: Patient is in anasarca. There is 4 plus pitting edema in all 4 extremities  Musculokeletal: No joint swelling  Neurologic: Mental status: alert and oriented x2. Not oriented to place    CARDIOVASCULAR  Pulse rate range      : Pulse  Av.7  Min: 83  Max: 93  BP range                  : Systolic (24hrs), Av , Min:80 , Max:125   ; Diastolic (24hrs), Av, Min:45, Max:74    Arterial BP       Systolic BP/Diastolic BP & MAP            PULMONARY  Respiration range   : Resp  Av.3  Min: 10  Max: 31  Pulse Ox range : SpO2  Av.3 %  Min: 94 %  Max: 100 %  Recent Labs     25  1445 25  0540   PH 7.267* 7.303* 7.292*   PCO2 45.3* 40.3 41.5   PO2 131.9* 109.5* 144.7*   HCO3 20.2* 19.5* 19.6*   BE -6.4* -6.4* -6.5*   O2SAT 98.7* 97.7 99.0*   THB 9.1* 9.1* 8.7*        Mechanical ventilation:  ET tube size:  Vent Settings  FiO2 : 30 %  Insp Rise Time (%): 2 %  Ppeak  Pplateau:  Compliance:      NEPHROLOGY (FLUIDS/ELECTROLYTES & NUTRITION)      I & O -  no current or recent pneumococcal infection. Infection due to Strep pneumoniae   cannot be ruled out since the antigen present in the sample may be below the detection limit   of the test.         Culture, Blood 2 [[id number]] Collected: 05/19/25 1520    Order Status: Completed Specimen: Blood Updated: 05/21/25 1741     Specimen Description .BLOOD     Special Requests          Culture NO GROWTH 2 DAYS    Culture, Blood 1 [[id number]] Collected: 05/19/25 1520    Order Status: Completed Specimen: Blood Updated: 05/21/25 1746     Specimen Description .BLOOD     Special Requests          Culture NO GROWTH 2 DAYS    Culture, Urine [[id number]] Collected: 05/19/25 1100    Order Status: Completed Specimen: Urine, clean catch Updated: 05/20/25 1416     Specimen Description .CLEAN CATCH URINE     Special Requests Site: Urine     Culture NO GROWTH           MRSA scree No components found for: \"MRSACU\"  HSV No results found for: \"HSVSRC\"  FLU No results found for: \"FLUA\" No results found for: \"FLUB\"  COVID-19 Labs:  Lab Results   Component Value Date/Time    COVID19 Not Detected 02/17/2025 04:38 PM     Legionella No results found for: \"LABLEGI\"  C Diff PCR No results found for: \"CDIFPCR\"  Strep pneumo No results found for: \"SPNEUAGU\"  Acid fast No results found for: \"AFBSMEAR\"  Stool No results found for: \"CXST\"  Fungust No results found for: \"FUNGUSSMEAR\"  VRE screen No results found for: \"VRECX\"  Ecoli O051:H7 No results found for: \"JMWBI464\"  Giardia No results found for: \"GIAAGS\"  Rotavirus No results found for: \"ROTA\"  Fecal leukocytes No results found for: \"FECLEU\"  -----------------------------------------------------  Fecal occult blood: .No results for input(s): \"OCCULTBLDFEC\" in the last 72 hours.  Occult blood screening: No results for input(s): \"OCCBS\" in the last 72 hours.  Occult blood QC: No results for input(s): \"OBQC\" in the last 72 hours.      Medications     Continuous Infusions:   bumetanide (BUMEX) 12.5 mg  in sodium chloride 0.9 % 125 mL infusion Stopped (05/22/25 0825)    sodium chloride 10 mL/hr at 05/22/25 0341    dextrose       Scheduled Meds:   thiamine  500 mg IntraVENous TID    midodrine  5 mg Oral TID WC    warfarin placeholder: dosing by pharmacy   Oral RX Placeholder    atorvastatin  20 mg Oral Nightly    [Held by provider] bumetanide  2 mg Oral Daily    rifAXIMin  400 mg Oral TID    meropenem  2,000 mg IntraVENous Q12H    busPIRone  5 mg Oral BID    sodium chloride flush  5-40 mL IntraVENous 2 times per day    multivitamin  1 tablet Oral Daily    folic acid  1 mg Oral Daily    thiamine  250 mg IntraVENous Daily    Followed by    [START ON 5/25/2025] thiamine  100 mg IntraVENous Daily    levETIRAcetam  500 mg IntraVENous Q12H    pantoprazole  40 mg IntraVENous BID     PRN Meds: sodium chloride flush, sodium chloride, LORazepam **OR** LORazepam **OR** LORazepam **OR** LORazepam **OR** LORazepam **OR** LORazepam **OR** LORazepam **OR** LORazepam, acetaminophen **OR** acetaminophen, melatonin, magnesium sulfate, ondansetron **OR** ondansetron, polyethylene glycol, glucose, dextrose bolus **OR** dextrose bolus, glucagon (rDNA), dextrose      Imaging studies     XR CHEST ABDOMEN NG PLACEMENT   Final Result   NG tube tip the body of the stomach.  It is in good position.         CT HEAD WO CONTRAST   Final Result   1. Abdominopelvic: Increased 3rd spacing of fluid.  Mild colonic ileus.   Incidental findings are described above.   2. Head: No acute intracranial abnormality.  Chronic ischemic changes   redemonstrated.         CT ABDOMEN PELVIS WO CONTRAST Additional Contrast? None   Final Result   1. Abdominopelvic: Increased 3rd spacing of fluid.  Mild colonic ileus.   Incidental findings are described above.   2. Head: No acute intracranial abnormality.  Chronic ischemic changes   redemonstrated.         XR CHEST 1 VIEW   Final Result   1. Cardiomegaly with pulmonary vascular congestion.   2. Pleural based opacity

## 2025-05-22 NOTE — PROGRESS NOTES
Department of Internal Medicine  Nephrology Attending Consult Note    Events reviewed    SUBJECTIVE: We are following Mr. Dorsey for FRANTZ on CKD.  Remains on BiPAP, lethargic.    PHYSICAL EXAM:      Vitals:    VITALS:  BP (!) 102/54   Pulse 87   Temp 98.6 °F (37 °C) (Bladder)   Resp 25   Ht 1.778 m (5' 10\")   Wt 122 kg (268 lb 15.4 oz)   SpO2 95%   BMI 38.59 kg/m²   24HR INTAKE/OUTPUT:    Intake/Output Summary (Last 24 hours) at 5/22/2025 0949  Last data filed at 5/22/2025 0834  Gross per 24 hour   Intake 499.72 ml   Output 1845 ml   Net -1345.28 ml       Constitutional: Patient is on BiPAP very lethargic  HEENT: Pupils equal reactive, on BiPAP  Respiratory: Decreased breath sounds at the bases  Cardiovascular/Edema: Heart sounds are regular  Gastrointestinal: Abdomen soft  Neurologic: Unable to level  Skin: No lesion  Other: + Edema    DATA:    CBC:   Lab Results   Component Value Date/Time    WBC 11.1 05/22/2025 05:19 AM    RBC 3.04 05/22/2025 05:19 AM    HGB 7.8 05/22/2025 05:19 AM    HCT 26.6 05/22/2025 05:19 AM    MCV 87.5 05/22/2025 05:19 AM    MCH 25.7 05/22/2025 05:19 AM    MCHC 29.3 05/22/2025 05:19 AM    RDW 18.7 05/22/2025 05:19 AM     05/22/2025 05:19 AM    MPV 11.9 05/22/2025 05:19 AM     CMP:    Lab Results   Component Value Date/Time     05/22/2025 05:19 AM    K 4.0 05/22/2025 05:19 AM    K 3.9 09/15/2022 03:08 PM     05/22/2025 05:19 AM    CO2 19 05/22/2025 05:19 AM    BUN 34 05/22/2025 05:19 AM    CREATININE 2.9 05/22/2025 05:19 AM    GFRAA >60 09/15/2022 03:08 PM    LABGLOM 25 05/22/2025 05:19 AM    LABGLOM 31 03/05/2024 05:57 AM    GLUCOSE 89 05/22/2025 05:19 AM    CALCIUM 9.0 05/22/2025 05:19 AM    BILITOT 1.9 05/22/2025 05:19 AM    ALKPHOS 233 05/22/2025 05:19 AM    AST 27 05/22/2025 05:19 AM    ALT 12 05/22/2025 05:19 AM     Magnesium:    Lab Results   Component Value Date/Time    MG 1.9 05/22/2025 05:19 AM     Phosphorus:    Lab Results   Component Value Date/Time     PHOS 4.1 05/22/2025 05:19 AM     Radiology Review:        FINDINGS:  Head: No acute intracranial hemorrhage, mass effect or hydrocephalus.. Redemonstrated old infarcts of right more than left cerebrum and cerebellum  and chronic white matter small vessel ischemic changes.     Abdominopelvic: Prominent subcutaneous edema is worse compared to prior. Redemonstrated left nephrolithiasis and cholelithiasis.  The renal lesions are less well demonstrated by noncontrast exam.  Some areas increased  attenuation probably hyperdense cysts but nonspecific consider evaluation on pre and post-contrast MRI or CT for enhancement evaluation as with neoplasm.  No intestinal obstruction.  Mild distension such as at the transverse colon suspicious for ileus.  Small pleural effusions.  Alveolar densities at least in the right base suggesting edema although nonspecific, with cardiomegaly  and postop changes.  Moderate ascites.     IMPRESSION:  1. Abdominopelvic: Increased 3rd spacing of fluid.  Mild colonic ileus. Incidental findings are described above.  2. Head: No acute intracranial abnormality.  Chronic ischemic changes redemonstrated.     Chest x-ray 5/19/2025    IMPRESSION:  1. Cardiomegaly with pulmonary vascular congestion.  2. Pleural based opacity in the right mid lower lung.  3. Subtle bibasilar opacity.             BRIEF SUMMARY OF INITIAL CONSULT:    Briefly Mr. Dorsey is a 59-year-old man with history of CKD stage IIIa, with large proteinuria, probably due to nephrosclerosis, baseline creatinine 1.9-2.1 mg/dL, HTN, CAD status post CABG, HFpEF 55%, valvular heart disease status post AVR and MVR both mechanical valves, post pacemaker placement, hyperlipidemia, CVA, testicular cancer status postchemotherapy, alcohol abuse, cirrhosis, who was admitted on who was admitted on 5/19/2025 after he presented to the ER with complaints of not being able to take care of himself and failure to thrive, in the ER CT head showed no

## 2025-05-22 NOTE — PROGRESS NOTES
Comprehensive Nutrition Assessment    Type and Reason for Visit:  Initial, Nutrition support (new TF)    Nutrition Recommendations/Plan:     Tube Feed vs PO diet, Tube Feed Recommendation if needed:    Standard w/ Fiber (Jevity 1.5) @ 65 ml/hr (Goal) + 1 pro mod once daily, Continuous x 24 hr/d  to provide 1560 ml tv, 2340 kcals, 100 gm pro (2412 kcals total, 118 gm pro total), 1186 ml free water, flushes per critical care        Malnutrition Assessment:  Malnutrition Status:  At risk for malnutrition (05/22/25 1224)    Context:  Chronic Illness     Findings of the 6 clinical characteristics of malnutrition:  Energy Intake:  75% or less estimated energy requirements for 1 month or longer (per RD encounter review)  Weight Loss:  Unable to assess (d/t fluid shifts w/ CHF / CKD)     Body Fat Loss:  No body fat loss     Muscle Mass Loss:  No muscle mass loss    Fluid Accumulation:  No fluid accumulation     Strength:  Not Performed    Nutrition Assessment:    Pt admit d/t FTT / SOB / AMS / not able to care for self or taking meds, found to have supratherapeutic INR. Noted FRANTZ on CKD, PNA, EtOH withdrawl likely 2/2 sz activity in ED. PMHx alcoholic liver cirrhosis 2/2 EtOH abuse, HLD, CVD, AICD, AVR / MVR, stroke, CHF, HTN, CAD s/p CABG ('20), obesity, COPD, testicular CA s/p chemo. Noted NPO x 3 days. NG TF order now canceled. Will provide TF recommendations if needed vs PO diet and will monitor.    Nutrition Related Findings:    A&Ox2 (person / time), I/O (-2.9L), rounded / rotund abd, active BS, N/V PTA, TF via NG (clamped), trace edema, hypernatremia, hyperchloremia, elevated Alk Phos / bili / BUN / Cr, low alb (Simultaneous filing. User may not have seen previous data.) Wound Type: None       Current Nutrition Intake & Therapies:    Average Meal Intake: NPO     Current Tube Feeding (TF) Orders:  Feeding Route:    Formula:    Schedule:    Feeding Regimen:    Additives/Modulars:    Water Flushes:    Current TF  Intake/Tolerance  Physical Signs/Symptoms Outcomes: Biochemical Data, GI Status, Fluid Status or Edema, Hemodynamic Status, Nutrition Focused Physical Findings, Skin, Weight, Nausea or Vomiting    Discharge Planning:    Too soon to determine     Alyce Ochoa  Contact: [phone]

## 2025-05-22 NOTE — PROGRESS NOTES
Discussed need to keep marks or remove for patient transfer to the floor and Dr. Joiner wants it to stay in. Orders placed to keep marks.

## 2025-05-22 NOTE — PLAN OF CARE
Problem: Safety - Medical Restraint  Goal: Remains free of injury from restraints (Restraint for Interference with Medical Device)  Description: INTERVENTIONS:1. Determine that other, less restrictive measures have been tried or would not be effective before applying the restraint2. Evaluate the patient's condition at the time of restraint application3. Inform patient/family regarding the reason for restraint4. Q2H: Monitor safety, psychosocial status, comfort, nutrition and hydration  Outcome: Progressing  Flowsheets (Taken 5/21/2025 5247 by Arlene Sutherland RN)  Remains free of injury from restraints (restraint for interference with medical device):   Determine that other, less restrictive measures have been tried or would not be effective before applying the restraint   Evaluate the patient's condition at the time of restraint application   Inform patient/family regarding the reason for restraint   Every 2 hours: Monitor safety, psychosocial status, comfort, nutrition and hydration

## 2025-05-22 NOTE — PLAN OF CARE
Patient continues to reach for lines and tubes despite attempts to deter when released from right wrist restraint.  Restraint continued for patient safety.    Problem: Neurosensory - Adult  Goal: Achieves stable or improved neurological status  Outcome: Not Progressing  Flowsheets (Taken 5/22/2025 1714)  Achieves stable or improved neurological status:   Assess for and report changes in neurological status   Monitor temperature, glucose, and sodium. Initiate appropriate interventions as ordered     Problem: Metabolic/Fluid and Electrolytes - Adult  Goal: Hemodynamic stability and optimal renal function maintained  Outcome: Not Progressing  Flowsheets (Taken 5/22/2025 1714)  Hemodynamic stability and optimal renal function maintained:   Monitor labs and assess for signs and symptoms of volume excess or deficit   Monitor intake, output and patient weight   Monitor urine specific gravity, serum osmolarity and serum sodium as indicated or ordered   Monitor response to interventions for patient's volume status, including labs, urine output, blood pressure (other measures as available)     Problem: Safety - Medical Restraint  Goal: Remains free of injury from restraints (Restraint for Interference with Medical Device)  Description: INTERVENTIONS:1. Determine that other, less restrictive measures have been tried or would not be effective before applying the restraint2. Evaluate the patient's condition at the time of restraint application3. Inform patient/family regarding the reason for restraint4. Q2H: Monitor safety, psychosocial status, comfort, nutrition and hydration  5/22/2025 1714 by Arlene Sutherland RN  Outcome: Progressing  Flowsheets (Taken 5/22/2025 1714)  Remains free of injury from restraints (restraint for interference with medical device):   Determine that other, less restrictive measures have been tried or would not be effective before applying the restraint   Evaluate the patient's condition at the time of  restraint application   Inform patient/family regarding the reason for restraint   Every 2 hours: Monitor safety, psychosocial status, comfort, nutrition and hydration     Problem: Skin/Tissue Integrity  Goal: Skin integrity remains intact  Description: 1.  Monitor for areas of redness and/or skin breakdown2.  Assess vascular access sites hourly3.  Every 4-6 hours minimum:  Change oxygen saturation probe site4.  Every 4-6 hours:  If on nasal continuous positive airway pressure, respiratory therapy assess nares and determine need for appliance change or resting period  Outcome: Progressing  Flowsheets (Taken 5/22/2025 1714)  Skin Integrity Remains Intact:   Monitor for areas of redness and/or skin breakdown   Every 4-6 hours minimum:  Change oxygen saturation probe site   Every 4-6 hours:  If on nasal continuous positive airway pressure, assess nares and determine need for appliance change or resting period   Turn and reposition as indicated   Pressure redistribution bed/mattress (bed type)   Check visual cues for pain     Problem: Neurosensory - Adult  Goal: Absence of seizures  Outcome: Progressing  Flowsheets (Taken 5/22/2025 1714)  Absence of seizures:   Monitor for seizure activity.  If seizure occurs, document type and location of movements and any associated apnea   Administer anticonvulsants as ordered     Problem: Respiratory - Adult  Goal: Achieves optimal ventilation and oxygenation  Outcome: Progressing  Flowsheets (Taken 5/22/2025 1714)  Achieves optimal ventilation and oxygenation:   Assess for changes in respiratory status   Assess for changes in mentation and behavior   Oxygen supplementation based on oxygen saturation or arterial blood gases   Encourage broncho-pulmonary hygiene including cough, deep breathe, incentive spirometry   Respiratory therapy support as indicated     Problem: Skin/Tissue Integrity - Adult  Goal: Skin integrity remains intact  Description: 1.  Monitor for areas of redness

## 2025-05-22 NOTE — PROGRESS NOTES
Report called to Esmer on 6WE for patient transfer to bed 6417B. Left HIPAA compliant voicemail to notify patient's family of bed transfer.

## 2025-05-22 NOTE — PROGRESS NOTES
[facility] Hospitalist Progress Note    Admitting Date and Time: 5/19/2025 10:40 AM  Admit Dx: Failure to thrive in adult [R62.7]  FRANTZ (acute kidney injury) [N17.9]  Supratherapeutic INR [R79.1]  Anticoagulated on Coumadin [Z79.01]  Alcohol withdrawal syndrome without complication (HCC) [F10.930]    Patient presented to the emergency department with concerns about not being able to take care of himself.  Patient apparently been canceled doctors appointments and not taking medications.  Has a history of alcohol abuse and is a daily drinker.  Patient states not taking his medications and he ran out of them.  He is not going to doctors appointments because he cannot get a hold of the doctor.  Also reported some shortness of breath.  Denies any fever, chills, nausea, vomiting, chest pain, headache, dizziness, abdominal pain, hematuria.,  Dysuria or constipation or diarrhea.  Vital signs within normal limits and stable.  Although reviewing his vital signs throughout his ER course he did drop to 85% on room air.  He is currently 99% on 3 L nasal cannula.  Laboratory studies demonstrate BUN 22, creatinine 2.4, procalcitonin 0.19, troponin 74 with repeat of 67, proBNP 5566, alk phos 259, hemoglobin 8.3 and a WBC of 12.3.  INR is greater than 10.  CT imaging shows some increased third spacing of fluid.  Chest x-ray shows bibasilar opacities.  Patient was started on ceftriaxone and doxycycline for presumed pneumonia.  He was also noted to have seizure-like activity for which she was given Ativan.  He was also placed on CIWA protocol.  Patient be admitted to the ICU.     Subjective:  Patient is being followed for Failure to thrive in adult [R62.7]  FRANTZ (acute kidney injury) [N17.9]  Supratherapeutic INR [R79.1]  Anticoagulated on Coumadin [Z79.01]  Alcohol withdrawal syndrome without complication (HCC) [F10.930]   Patient seen and examined.   Events reviewed.          warfarin  1.5 mg Oral Once    thiamine  500 mg

## 2025-05-22 NOTE — PROGRESS NOTES
Len Dorsey is a 59 y.o. right handed male     Neurology following for possible seizure    PMH of EtOH abuse, alcohol withdrawal seizures, and previous stroke    Assessment:     Postinfarct seizures  2 witnessed seizures in ER  History of stroke which places him at risk for post stroke epilepsy  Maintained on Keppra  EEG as outpatient    Plan:     Continue Keppra 500 mg twice daily  Continue high-dose thiamine  Continue CIWA protocol  Outpatient EEG  Unable to have MRI  Neurology will sign off call if needed  Follow-up with neurology after discharge  Continue seizure precautions for 6 months from date of last seizure. These include, but are not limited to:   No driving  No riding bicycles in traffic  No operating dangerous/heavy machinery  No engaging in activities at dangerous heights including using ladders  No taking baths unattended  No swimming  No engaging in activities near fire unattended  Recommend caution or avoidance of cooking or using potentially dangerous objects such as knives.  Avoid any activities where sudden loss of consciousness could result in injury to yourself or others    History of Present Illness:     Patient presented to the ER on 5/19/2025 complaining of failure to thrive.  Patient lives with his fiancée and has a history of significant alcohol abuse.  He has not drank in approximately 1 month.  Patient did not take his medications because he ran out of them and has not been going to his regular doctors appointments.  He said he is unable to get a hold of his doctor.  He is also having bouts of emesis    While in the ER he experienced a generalized seizure left eye deviation for approximately 45 seconds.  He then had another seizure while in the ER.  He was transferred to the ICU for further evaluation management    Neurology was consulted for seizure activity    Subjective:     Patient sitting up in bed awake alert and watching TV.  He is alert and oriented x 4.  His speech is    No acute intracranial abnormality.  Chronic ischemic changes  redemonstrated.     All labs and imaging studies reviewed independently today         Valery Muñoz, ARNALDO - CNP  9:45 AM  5/22/2025

## 2025-05-23 NOTE — PROCEDURES
PROCEDURE NOTE  Date: 5/23/2025   Name: Len Dorsey  YOB: 1965    Procedures          Intubation Procedure Note    Indication:  Respiratory Failure    Time Out:  Completed immediately prior to the start of the procedure which included verification of the correct patient, correct site and agreement on the procedure to be done.    Consent:  Consent was not able to be obtained because the procedure was emergent or the patient was unable to give consent and a surrogate decision maker was not available.     Procedure being performed:   Orotracheal intubation    Pre-oxygenation:  2 minutes of 100% FiO2 administered via Bag mask .    Medications:   Versed, Fentanyl, and Etomidate    Description/Findings:  Visualization: GlideScope was utilized to intubate the patient. Cormack-Lehane: Grade 3: only the epiglottis is visible. A 8.0mm endotracheal tube was passed through the cords on the first attempt. The tube was secured at 24cm at the lip. Tracheal position was confirmed using end-tidal CO2 detector, absence of breath sounds over epigastric region, and bilateral breath sounds in lungs. Respiratory therapy was at the bedside and assisted with ventilator management.      Estimated Blood Loss:  None    Complications:   No apparent complications    Follow up Chest X-Ray:   Ordered.     Proceduralist:   Dr Moreno    Assistant(s):  Dr. Eugene    Supervision:  Supervision was required for this procedure. I was physically present and supervised all key elements of the procedure.     Electronically signed by Bay Eugene MD on 5/23/2025 at 3:49 PM

## 2025-05-23 NOTE — PROGRESS NOTES
[facility] Hospitalist Progress Note    Admitting Date and Time: 5/19/2025 10:40 AM  Admit Dx: Failure to thrive in adult [R62.7]  FRANTZ (acute kidney injury) [N17.9]  Supratherapeutic INR [R79.1]  Anticoagulated on Coumadin [Z79.01]  Alcohol withdrawal syndrome without complication (HCC) [F10.930]    Patient presented to the emergency department with concerns about not being able to take care of himself.  Patient apparently been canceled doctors appointments and not taking medications.  Has a history of alcohol abuse and is a daily drinker.  Patient states not taking his medications and he ran out of them.  He is not going to doctors appointments because he cannot get a hold of the doctor.  Also reported some shortness of breath.  Denies any fever, chills, nausea, vomiting, chest pain, headache, dizziness, abdominal pain, hematuria.,  Dysuria or constipation or diarrhea.  Vital signs within normal limits and stable.  Although reviewing his vital signs throughout his ER course he did drop to 85% on room air.  He is currently 99% on 3 L nasal cannula.  Laboratory studies demonstrate BUN 22, creatinine 2.4, procalcitonin 0.19, troponin 74 with repeat of 67, proBNP 5566, alk phos 259, hemoglobin 8.3 and a WBC of 12.3.  INR is greater than 10.  CT imaging shows some increased third spacing of fluid.  Chest x-ray shows bibasilar opacities.  Patient was started on ceftriaxone and doxycycline for presumed pneumonia.  He was also noted to have seizure-like activity for which she was given Ativan.  He was also placed on CIWA protocol.  Patient be admitted to the ICU.     Subjective:  Patient is being followed for Failure to thrive in adult [R62.7]  FRANTZ (acute kidney injury) [N17.9]  Supratherapeutic INR [R79.1]  Anticoagulated on Coumadin [Z79.01]  Alcohol withdrawal syndrome without complication (HCC) [F10.930]   Patient seen and examined.   Events reviewed.   Lethargic on the floor, transferred out of the ICU  will have ID see for antibiotic recommendations.  HIV negative March 2025.  Chest x-ray looks worse today.  Obtain viral molecular panel.  Obtain ammonia. Monitor LFT's, consider GI c/s. Continue Lactulose and Rifaximin.     Palliative care consult for goals of care    Code Status: Full Code  DVT/GI Prophylaxis: Warfarin     Dispo: Pending clinical course.  Transferring back to the ICU      Total time 50 minutes spent reviewing chart notes, reviewing treatment plans and prognosis with the patient/caregiver, and documenting in the chart.      NOTE: This report was transcribed using voice recognition software. Every effort was made to ensure accuracy; however, inadvertent computerized transcription errors may be present.  Electronically signed by Ramila Chase MD on 5/23/2025 at 1:06 PM

## 2025-05-23 NOTE — PROCEDURES
PROCEDURE NOTE  Date: 5/23/2025   Name: Len Dorsey  YOB: 1965    Central Line Insertion     Procedure: right internal jugular vein Triple Lumen Catheter placement.    Indications: vascular access    Consent: The primary decision maker counseled regarding the procedure, its indications, risks, potential complications and alternatives, and any questions were answered. Consent was obtained to proceed.    Number of sticks: 1    Number of Kits used: 1    Procedure: Time Out: Immediately prior to the procedure a \"timeout\" was called to verify the correct patient and procedure. The patient was place in the trendelenburg position and the skin over the right internal jugular vein was prepped with betadine and draped in a sterile fashion. Local anesthesia was obtained by infiltration using 1% Lidocaine without epinephrine.  With Ultrasound guidance a large bore needle was used to identify the vein, dark non pulsatile blood returned. The guide wire was then inserted through the needle with minimal resistance. 2 mm nick was made in the skin beside the guidewire. Then a dilator was inserted and removed. A triple lumen catheter was then inserted into the vessel over the guide wire using the Seldinger technique to the  17 cm rehana.  All ports showed good, free flowing blood return and were flushed with saline solution.  The catheter was then securely fastened to the skin with sutures and covered with a bio patch and sterile dressing.  A post procedure X-ray was ordered and showed good line position.    Complications: None   The patient tolerated the procedure well.    Estimated blood loss: 5 ml.    Richar Olivia MD PGY-1  5/23/2025  2:54 PM      Attending: Dr. Bay Eugene

## 2025-05-23 NOTE — PROGRESS NOTES
Pharmacy Consultation Note  (Antibiotic Dosing and Monitoring)    Initial consult date: 5/23/2025  Consulting physician/provider: Dr. Redding  Drug: Vancomycin  Indication: Fever/Leukocytosis    Age/  Gender Height Weight IBW  Allergy Information   59 y.o./male 177.8 cm (5' 10\") 99.8 kg (220 lb)     Ideal body weight: 73 kg (160 lb 15 oz)  Adjusted ideal body weight: 92.6 kg (204 lb 2.3 oz)   Patient has no known allergies.      Renal Function:  Recent Labs     05/22/25 2024 05/23/25  0459 05/23/25  1218   BUN 36* 38* 39*   CREATININE 2.7* 2.8* 2.9*       Intake/Output Summary (Last 24 hours) at 5/23/2025 1427  Last data filed at 5/23/2025 1200  Gross per 24 hour   Intake 1716.63 ml   Output 2165 ml   Net -448.37 ml       Vancomycin Monitoring:  Trough:  No results for input(s): \"VANCOTROUGH\" in the last 72 hours.  Random:    Recent Labs     05/21/25  0507   VANCORANDOM 11.7       Vancomycin Administration Times:  Recent vancomycin administrations        No vancomycin IV orders with administrations found.            Orders not given:            vancomycin (VANCOCIN) 2000 mg in 0.9% sodium chloride 500 mL IVPB    vancomycin (VANCOCIN) intermittent dosing (placeholder)                    Assessment:  Patient is a 59 y.o. male who has been initiated on vancomycin  Estimated Creatinine Clearance: 36 mL/min (A) (based on SCr of 2.9 mg/dL (H)).    Plan:  Vancomycin 2000mg IV x 1  Will check vancomycin random level tomorrow AM   Will continue to monitor renal function   Pharmacy to follow      Merari Van, PharmD, BCPS, BCCCP 5/23/2025 2:27 PM      ZHOU: [phone]  SEY: [phone]  SJW: [phone]

## 2025-05-23 NOTE — PROGRESS NOTES
Speech Language Pathology  NAME:  Len Dorsey  :  1965  DATE: 2025  [facility]    Pt unavailable at 1150 for Clinical Swallow Evaluation Discussed with patient nurse in person     REASON:  Sleeping/ Lethargic- would not awaken for safe/ appropriate SLP assessment.    Will re-attempt as appropriate.       Thank You    Tiffanie Hargrove M.S., CCC-SLP  Speech-Language Pathologist  [id number]  2025

## 2025-05-23 NOTE — CARE COORDINATION
CM update note.  Patient lethargic, transferred out of ICU on 5/22.  Patient transferred back to ICU today.  NPO NGT with tube feeding.  Intubated and sedated.  Will need to discuss discharge planning further with patient POA.  Patient will most likely require placement. CM/SW to follow. Rod Licea RN -[phone].

## 2025-05-23 NOTE — CONSULTS
[facility]  Internal Medicine Residency Program  Consult  MICU    Patient:  Len Dorsey 59 y.o. male MRN: [id number]     Date of Service: 5/23/2025    Hospital Day: 5      Chief complaint: respiratory failure    History of Present Illness   The patient is a 59 y.o. male with medical history of alcohol abuse, VHD s/p mechanical AVR and MVR (2020) on chronic anticoagulation with warfarin, CAD s/p CABG (2020), HFimpEF, CVA, CKD, alcoholic liver cirrhosis who was transfer from MICU yesterday 5/22/2025 after being admitted for heart failure exacerbation, respiratory failure secondary to CHF, FRANTZ and recent seizures. After stabilization of respiratory status patient was transferred, however, this morning, patient had increased work of breathing and increasing oxygen requirements. ABG in the floors ph 7.31, Pco2 45.9 and Po2 59.1 with tachypnea.     At arrival to the ICU patient was very somnolent and lethargic but following some commands. There was poor vascular access and a central line was placed. Conversations with family were held and patient is to remain full CODE and yes to intubation if needed. Patient later started having more increased work of breathing and poorer mentation, reason why he was intubated.       Past Medical History:      Diagnosis Date    Alcoholic liver disease     H/O prosthetic aortic valve replacement 09/2020    [facility]  Mitral and pacemaker at this time as well    History of mitral valve replacement 09/2020    at [facility]  Aortic Valve placed at this time as well as pacemaker    Stroke (HCC)     Testicular cancer (HCC)     in remission since 1988       Past Surgical History:        Procedure Laterality Date    COLONOSCOPY N/A 8/24/2022    COLONOSCOPY POLYPECTOMY HOT BIOPSY performed by Rene Lorenzo MD at [facility]    FRACTURE SURGERY      Left Tibial Plateau Fracture 3/29/2022     PACEMAKER PLACEMENT  09/2020    [facility]PPER GASTROINTESTINAL ENDOSCOPY      Resident's Assessment and Plan     Len Dorsey is a 59 y.o. male    Acute encephalopathy secondary to seizure activity vs alcoholic vs metabolic  EEG done showed no seizures or epileptiform changes but moderate nonspecific encephalopathy  Elevated ammonia levels  B12 and folate normal  Acute hypoxic respiratory failure s/p intubation and mechanical ventilation secondary to CHF exacerbation vs infectious  Leukocytosis, likely infectious  Hyperammonemia on lactulose and rifaximin  Elevated alkaline phosphatase and Direct and total bilirubin levels, R/O cholecystitis vs acute cholangitis   Elevated pro-BNP likely secondary to acute HF exacerbation  FRANTZ on CKD, baseline Cr 1.6   Elevated troponin likely secondary demand ischemia   Supratherapeutic INR, s/p Vitamin K 10 mg IV, resolved   Hypernatremia  Valvular heart disease s/p mechanical AVR and MVR in 2020 with redo in 2023 due to endocarditis, on warfarin 1 mg QD  Acute on chronic COR pulmonale  Coronary artery disease s/p CABG in 9/2020, LIMA to LAD  HFimpEF, EF 50% (2/19/2025)  EF recovered to 50% (2/19/2025) from 40-45% (5/10/2024)  History of complete heart block s/p dual chamber permanent pacemaker [employer] in 2020 at   Hypertension  COPD  Normal anion gap metabolic acidosis  Alcoholic liver cirrhosis and hepatic steatosis  History of colon polyps  Testicular cancer s/p chemotherapy  Iron deficiency anemia  Hypoalbuminemia    Plan    S/P intubation and mechanical ventilation, obtain cxray ABG daily while intubated  Wean off sedation and daily SAT and SBT  Follow CT scan of the head for unequal pupils and ruling out acute stroke  Continue diuresis, currently on bumex gttt 1 mg/ hr  Follow metabolic workup  Follow infectious workup  Continue free water flushes for hypernatremia 100 cc Q1 hr, nephro also following  Follow RUQ US  Continue lactulose and rifaximin  Continue Keppra 500 mg BID   Continue high dose thiamine protocol and folic acid  Maintain

## 2025-05-23 NOTE — PROGRESS NOTES
Antibiotic Extended Infusion Policy     This patient is on medication that requires renal, weight, and/or indication dose adjustment.      Date Body Weight IBW  Adjusted BW SCr  CrCl Dialysis status BMI   5/23/2025 122 kg (268 lb 15.4 oz) Ideal body weight: 73 kg (160 lb 15 oz)  Adjusted ideal body weight: 92.6 kg (204 lb 2.3 oz) Serum creatinine: 2.8 mg/dL (H) 05/23/25 0459  Estimated creatinine clearance: 38 mL/min (A) N/a Body mass index is 38.59 kg/m².       Pharmacy has dose-adjusted the following medication(s):    Ordered Medication: Meropenem 2000mg q12h     Order Changed/converted to: Meropenem 1000mg q12h    These changes were made per protocol according to the [facility]   Automatic Extended Infusion Dose Adjustment Policy.     *Please note this dose may need readjusted if patient's condition changes.    Please contact pharmacy with any questions regarding these changes.    Malou Cruz RPH  5/23/2025  10:29 AM

## 2025-05-23 NOTE — PROCEDURES
ELECTROENCEPHALOGRAM REPORT     DATE OF SERVICE: 2025  MRN: [id number]  PATIENT NAME: Len Dorsey  Patient's : 1965  Patient's Age: 59 y.o.  Gender: male    History:  The patient is undergoing evaluation for altered mental status     Patient consent: Correct patient identified    Medications:    Current Facility-Administered Medications:     warfarin (COUMADIN) tablet 1 mg, 1 mg, Oral, Once, Santo Long MD    aztreonam (AZACTAM) 1,000 mg in sodium chloride 0.9 % 50 mL IVPB, 1,000 mg, IntraVENous, Q12H, Mikael Redding MD    vancomycin (VANCOCIN) 2000 mg in 0.9% sodium chloride 500 mL IVPB, 2,000 mg, IntraVENous, Once, Mikael Redding MD    vancomycin (VANCOCIN) intermittent dosing (placeholder), , Other, RX Placeholder, Mikael Redding MD    propofol 200 MG/20ML infusion, , , ,     midazolam (VERSED) 2 MG/2ML injection, , , ,     midazolam (VERSED) 2 MG/2ML injection, , , ,     chlorhexidine (PERIDEX) 0.12 % solution 15 mL, 15 mL, Mouth/Throat, BID, Aurelio Montoya MD    etomidate (AMIDATE) 2 MG/ML injection, , , ,     propofol 1000 MG/100ML infusion, , , ,     [COMPLETED] thiamine (B-1) injection 500 mg, 500 mg, IntraVENous, TID, 500 mg at 25 1233 **FOLLOWED BY** thiamine (B-1) injection 250 mg, 250 mg, IntraVENous, Daily, 250 mg at 25 0842 **FOLLOWED BY** [START ON 2025] thiamine (B-1) injection 100 mg, 100 mg, IntraVENous, Daily, Aurelio Montoya MD    lactulose (CHRONULAC) 10 GM/15ML solution 20 g, 20 g, Oral, TID, Jaja Moreno MD, 20 g at 25 0842    white petrolatum ointment, , Topical, BID PRN, Bay Eugene MD    ferric gluconate (FERRLECIT) 125 mg in sodium chloride 0.9 % 100 mL IVPB, 125 mg, IntraVENous, Daily, Joiner, Hilmer O, MD, Stopped at 25    midodrine (PROAMATINE) tablet 5 mg, 5 mg, Oral, TID WC, Jaja Moreno MD, 5 mg at 25 0845    bumetanide (BUMEX) 12.5 mg in sodium chloride 0.9 % 125 mL infusion, 1 mg/hr, IntraVENous,

## 2025-05-23 NOTE — CONSULTS
Department of Internal Medicine  Infectious Diseases   Consult Note      Reason for Consult:  Fever , leukocytosis       Requesting Physician:  Dr Chase         HISTORY OF PRESENT ILLNESS:      Pt is unresponsive at present . This is a 59 yrs old male with hx of alcoholic liver disease, aortic and mitral valve replacement surgery , testicular cancer , was admitted to [facility] on 5/19 with supra therapeutic INR ( 10 ) , FRANTZ . He was treated with IV meropenem in the ICU and transferred to the [facility] (5/22) - pt became more lethargic, unresponsive today - being transferred back to MICU .  Pt developed low grade temp  F , WBC 12K-13 K   Blood cx neg to date     Past Medical History:      Past Medical History:   Diagnosis Date    Alcoholic liver disease     H/O prosthetic aortic valve replacement 09/2020    [facility]  Mitral and pacemaker at this time as well    History of mitral valve replacement 09/2020    at [facility]  Aortic Valve placed at this time as well as pacemaker    Stroke (HCC)     Testicular cancer (HCC)     in remission since 1988         Past Surgical History:      Past Surgical History:   Procedure Laterality Date    COLONOSCOPY N/A 8/24/2022    COLONOSCOPY POLYPECTOMY HOT BIOPSY performed by Rene Lorenzo MD at [facility] ENDOSCOPY    FRACTURE SURGERY      Left Tibial Plateau Fracture 3/29/2022     PACEMAKER PLACEMENT  09/2020    UPPER GASTROINTESTINAL ENDOSCOPY N/A 8/24/2022    EGD DIAGNOSTIC ONLY performed by Rene Lorenzo MD at [facility] ENDOSCOPY    UPPER GASTROINTESTINAL ENDOSCOPY N/A 5/9/2024    ESOPHAGOGASTRODUODENOSCOPY          +++AVAIL 1430+++ performed by Min Mcdaniels DO at [facility] ENDOSCOPY    UPPER GASTROINTESTINAL ENDOSCOPY N/A 2/28/2025    ESOPHAGOGASTRODUODENOSCOPY BIOPSY performed by Herbert Wells MD at [facility] ENDOSCOPY         Current Medications:      Current Facility-Administered Medications   Medication Dose Route Frequency Provider Last Rate Last Admin    warfarin (COUMADIN)  cx X 2    Thank you Dr Chase for the consult

## 2025-05-23 NOTE — PLAN OF CARE
Problem: Chronic Conditions and Co-morbidities  Goal: Patient's chronic conditions and co-morbidity symptoms are monitored and maintained or improved  Outcome: Progressing     Problem: Discharge Planning  Goal: Discharge to home or other facility with appropriate resources  Outcome: Progressing     Problem: Safety - Adult  Goal: Free from fall injury  Outcome: Progressing     Problem: Pain  Goal: Verbalizes/displays adequate comfort level or baseline comfort level  Outcome: Progressing     Problem: Safety - Medical Restraint  Goal: Remains free of injury from restraints (Restraint for Interference with Medical Device)  Description: INTERVENTIONS:1. Determine that other, less restrictive measures have been tried or would not be effective before applying the restraint2. Evaluate the patient's condition at the time of restraint application3. Inform patient/family regarding the reason for restraint4. Q2H: Monitor safety, psychosocial status, comfort, nutrition and hydration  5/22/2025 2132 by Nicole Bruno RN  Outcome: Progressing  5/22/2025 1714 by Arlene Sutherland RN  Outcome: Progressing  Flowsheets (Taken 5/22/2025 1714)  Remains free of injury from restraints (restraint for interference with medical device):   Determine that other, less restrictive measures have been tried or would not be effective before applying the restraint   Evaluate the patient's condition at the time of restraint application   Inform patient/family regarding the reason for restraint   Every 2 hours: Monitor safety, psychosocial status, comfort, nutrition and hydration     Problem: Skin/Tissue Integrity  Goal: Skin integrity remains intact  Description: 1.  Monitor for areas of redness and/or skin breakdown2.  Assess vascular access sites hourly3.  Every 4-6 hours minimum:  Change oxygen saturation probe site4.  Every 4-6 hours:  If on nasal continuous positive airway pressure, respiratory therapy assess nares and determine need for  serum osmolarity and serum sodium as indicated or ordered   Monitor response to interventions for patient's volume status, including labs, urine output, blood pressure (other measures as available)     Problem: Confusion  Goal: Confusion, delirium, dementia, or psychosis is improved or at baseline  Description: INTERVENTIONS:1. Assess for possible contributors to thought disturbance, including medications, impaired vision or hearing, underlying metabolic abnormalities, dehydration, psychiatric diagnoses, and notify attending LIP2. [place] high risk fall precautions, as indicated3. Provide frequent short contacts to provide reality reorientation, refocusing and direction4. Decrease environmental stimuli, including noise as appropriate5. Monitor and intervene to maintain adequate nutrition, hydration, elimination, sleep and activity6. If unable to ensure safety without constant attention obtain sitter and review sitter guidelines with assigned personnel7. Initiate Psychosocial CNS and Spiritual Care consult, as indicated  INTERVENTIONS:1. Assess for possible contributors to thought disturbance, including medications, impaired vision or hearing, underlying metabolic abnormalities, dehydration, psychiatric diagnoses, and notify attending LIP2. [place] high risk fall precautions, as indicated3. Provide frequent short contacts to provide reality reorientation, refocusing and direction4. Decrease environmental stimuli, including noise as appropriate5. Monitor and intervene to maintain adequate nutrition, hydration, elimination, sleep and activity6. If unable to ensure safety without constant attention obtain sitter and review sitter guidelines with assigned personnel7. Initiate Psychosocial CNS and Spiritual Care consult, as indicated  Outcome: Progressing

## 2025-05-23 NOTE — PROGRESS NOTES
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     Weight:   Wt Readings from Last 1 Encounters:   05/22/25 122 kg (268 lb 15.4 oz)       TSH:    Lab Results   Component Value Date/Time    TSH 2.98 05/20/2025 04:31 AM       Hepatic Function Panel:                            Lab Results   Component Value Date/Time    ALKPHOS 283 05/23/2025 04:59 AM    ALT 11 05/23/2025 04:59 AM    AST 32 05/23/2025 04:59 AM    BILITOT 1.9 05/23/2025 04:59 AM    BILIDIR 2.3 02/21/2025 04:55 AM    IBILI 1.1 02/21/2025 04:55 AM         Recent Labs     05/21/25  0507 05/21/25  1823 05/22/25  0519 05/23/25  0459   HGB 7.7* 7.4* 7.8* 7.7*    126* 126*  --      Date Warfarin Dose INR Heparin or LMWH Comment   5/20 1 mg 3.2 -- VitK 10 mg x1 on 5/19  Meropenem started   5/21 1 mg- medication held  2 -- Warfarin held for IR procedure   5/22 1.5 mg 2 --    5/23 < 1mg > 2.4              Assessment:  Patient is a 59 y.o. male on warfarin for Atrial Fibrillation, AVR, and mMVR.  Patient's home warfarin dosing regimen is 1 mg daily - there is some questions surrounding compliance.   Goal INR 2.5 - 3.5  Patient currently on meropenem  5/23: INR 2.4 today    Plan:  Warfarin 1mg x 1 tonight  Daily PT/INR until the INR is stable within the therapeutic range  Pharmacist will follow and monitor/adjust dosing as necessary    Thank you for this consult,    Trina Cruz, PharmD, BCPS 5/23/2025 9:13 AM  [phone]

## 2025-05-23 NOTE — PLAN OF CARE
Problem: Chronic Conditions and Co-morbidities  Goal: Patient's chronic conditions and co-morbidity symptoms are monitored and maintained or improved  5/23/2025 1115 by Vani PATEL RN  Outcome: Progressing  Flowsheets (Taken 5/23/2025 0800)  Care Plan - Patient's Chronic Conditions and Co-Morbidity Symptoms are Monitored and Maintained or Improved:   Monitor and assess patient's chronic conditions and comorbid symptoms for stability, deterioration, or improvement   Collaborate with multidisciplinary team to address chronic and comorbid conditions and prevent exacerbation or deterioration  5/22/2025 2132 by Nicole Bruno RN  Outcome: Progressing     Problem: Discharge Planning  Goal: Discharge to home or other facility with appropriate resources  5/23/2025 1115 by Vani PATEL RN  Outcome: Progressing  Flowsheets (Taken 5/23/2025 0800)  Discharge to home or other facility with appropriate resources:   Identify barriers to discharge with patient and caregiver   Arrange for needed discharge resources and transportation as appropriate  5/22/2025 2132 by Nicole Bruno RN  Outcome: Progressing     Problem: Safety - Adult  Goal: Free from fall injury  5/23/2025 1115 by Vani PATEL RN  Outcome: Progressing  5/22/2025 2132 by Nicole Bruno RN  Outcome: Progressing     Problem: Pain  Goal: Verbalizes/displays adequate comfort level or baseline comfort level  5/23/2025 1115 by Vani PATEL RN  Outcome: Progressing  5/22/2025 2132 by Nicole Bruno RN  Outcome: Progressing     Problem: Safety - Medical Restraint  Goal: Remains free of injury from restraints (Restraint for Interference with Medical Device)  Description: INTERVENTIONS:1. Determine that other, less restrictive measures have been tried or would not be effective before applying the restraint2. Evaluate the patient's condition at the time of restraint application3. Inform patient/family regarding the reason for restraint4. Q2H: Monitor safety,

## 2025-05-23 NOTE — PROGRESS NOTES
[facility]  Department of Pulmonary, Critical Care and Sleep Medicine  [facility]  Department of Internal Medicine  Progress Note    Nikhil Rosa DO Pately Sandraji DO Robert MCINTOSH        Patients Primary Pulmonologist is:     SUBJECTIVE:  Patient seen and examined on the 6th floor, upon my assessment the patient appeared significantly encephalopathic, was tachypneic and using accessory muscles. Discussed with nursing and Dr. Eugene who had seen the patient in ICU. This appears to be a significant decline.        OBJECTIVE:  Vitals:    05/23/25 0745 05/23/25 1136 05/23/25 1200 05/23/25 1400   BP: 109/69 115/71 124/78    Pulse: (!) 108 (!) 110 (!) 106    Resp: 21 22 22    Temp: 100.1 °F (37.8 °C) (!) 100.7 °F (38.2 °C) 99.3 °F (37.4 °C) 100 °F (37.8 °C)   TempSrc: Axillary Axillary Axillary Axillary   SpO2: 99% 98% 96%    Weight:       Height:         Constitutional: stuporous, ill appearing   EENT: EOMI VIN. MMM. No icterus. No thrush.     Neck:  Trachea was midline.   Respiratory: Rales bilaterally, + use of accessory muscles  Cardiovascular: Irregularly irregular, tachycardic,    Pulses:  Equal bilaterally.    Abdomen: Soft without organomegaly. No rebound, rigidity.  No guarding.  Lymphatic: No lymphadenopathy.  Musculoskeletal: Without weakness or gross deficits  Extremities: 3+ edema to all extremities.  Reflexes appear adequate.   Skin:  Warm and dry.  No skin rashes.   Neurological/Psychiatric: Encephalopathic        DATA:    Monitor Strips:  Reviewed & discusses with technical team. No changes noted.    RADIOLOGY:  Stat portable cxr ordered and pending.       CBC with Differential:    Lab Results   Component Value Date/Time    WBC 13.0 05/23/2025 12:18 PM    RBC 3.03 05/23/2025 12:18 PM    HGB 7.7 05/23/2025 12:18 PM    HCT 26.7 05/23/2025 12:18 PM      05/23/2025 12:18 PM    MCV 88.1 05/23/2025 12:18 PM    MCH 25.4 05/23/2025 12:18 PM    MCHC 28.8 05/23/2025 12:18 PM    RDW 19.4 05/23/2025 12:18 PM    NRBC 1 05/20/2025 04:31 AM    METASPCT 2 03/06/2025 04:30 AM    LYMPHOPCT 5 05/23/2025 12:18 PM    PROMYELOPCT DUPLICATE 07/18/2023 04:12 AM    MONOPCT 4 05/23/2025 12:18 PM    MYELOPCT 2 05/20/2025 04:31 AM    EOSPCT 4 05/23/2025 12:18 PM    BASOPCT 1 05/23/2025 12:18 PM    MONOSABS 0.46 05/23/2025 12:18 PM    LYMPHSABS 0.68 05/23/2025 12:18 PM    EOSABS 0.46 05/23/2025 12:18 PM    BASOSABS 0.11 05/23/2025 12:18 PM     CMP:    Lab Results   Component Value Date/Time     05/23/2025 01:45 PM    K 3.9 05/23/2025 01:45 PM    K 3.9 09/15/2022 03:08 PM     05/23/2025 01:45 PM    CO2 20 05/23/2025 01:45 PM    BUN 39 05/23/2025 01:45 PM    CREATININE 2.8 05/23/2025 01:45 PM    GFRAA >60 09/15/2022 03:08 PM    LABGLOM 26 05/23/2025 01:45 PM    LABGLOM 31 03/05/2024 05:57 AM    GLUCOSE 139 05/23/2025 01:45 PM    CALCIUM 9.2 05/23/2025 01:45 PM    BILITOT 2.0 05/23/2025 12:18 PM    ALKPHOS 313 05/23/2025 12:18 PM    AST 35 05/23/2025 12:18 PM    ALT 13 05/23/2025 12:18 PM       CLINICAL ASSESMENT:  Acute hypoxemic resp failure  Encephalopathy  Hypernatremia  FRANTZ on CKD  Sepsis  Fever/Leukocytosis    PLAN: If needed the case was discussed with the care team  Transfer to ICU due to concern for impending resp failure.   Obtain stat CXR, EKG, ABG, CK, Troponin, CBC, CMP, Blood cultures  Stop bumex gtt  Continue antibiotics as per BASILIO Chandra DO

## 2025-05-23 NOTE — ACP (ADVANCE CARE PLANNING)
Advance Care Planning     Palliative Team Advance Care Planning (ACP) Conversation    Date of Conversation: 05/23/25    Individuals present for the conversation: Legal healthcare agent named below     ACP documents on file prior to discussion:  -Power of  for Healthcare    Previously completed document/s not on file: Patient / participant reports that there are no previously executed ACP documents.    Healthcare Decision Maker:    Primary Decision Maker: Leonie Moreland - Domestic Partner - [phone]     Conversation Summary:  HCPOA on file appointing Leonie Moreland as agent. She discussed code status, full code at this time.    Resuscitation Status:   Code Status: Full Code     Documentation Completed:  -No new documents completed.    I spent 16 minutes with the patient and/or surrogate decision maker discussing the patient's wishes and goals.      ANJEL Chou

## 2025-05-23 NOTE — CONSULTS
Palliative Care Department  [phone]  Palliative Care Initial Consult  Provider Megan Child, APRN - CNP     Len Dorsey  [id number]  Hospital Day: 5  Date of Initial Consult: 5/23/25  Referring Provider: Ramila Chase MD   Palliative Medicine was consulted for assistance with: Goals of Care    HPI:   Len Dorsey is a 59 y.o. with a medical history of alcoholic liver disease, aortic valve replacement, mitral valve replacement, HFrEF 40-45%, CVA, testicular ca s/p chemotherapy, CAD s/p CABG  who was admitted on 5/19/2025 from home with a CHIEF COMPLAINT of altered mental status.  Patient's girlfriend/POA called EMS for patient being confused and \"dazed\".  He has a history of alcoholism, but his last drink was February 17, 2025.  In ER, found to be hypoxic 85%, supratherapeutic INR greater than 10, and had witnessed seizure activity described as generalized jerking with eyes deviated lasting 45 seconds.  CT head negative for acute findings, he was admitted to ICU for closer monitoring.  Neurology and nephrology consulted.  He was stabilized and transferred out of ICU on 5/22, 5/23 returns to ICU for worsening respiratory status and lethargy.  Palliative medicine consulted for further assistance with goals of care.    ASSESSMENT/PLAN:     Pertinent Hospital Diagnoses     Altered mental status  Acute respiratory failure  Supratherapeutic INR  Acute renal failure  Alcoholic liver cirrhosis  CHF      Palliative Care Encounter / Counseling Regarding Goals of Care  Please see detailed goals of care discussion as below  At this time, Len Dorsey, Does Not have capacity for medical decision-making.  Capacity is time limited and situation/question specific  During encounter Leonie was surrogate medical decision-maker  Outcome of goals of care meeting:   Confirmed full code  Continue current medical management, POA agreeable to intubation if needed  Code status Full Code  Advanced Directives:  (Axillary)   Resp 22   Ht 1.778 m (5' 10\")   Wt 122 kg (268 lb 15.4 oz)   SpO2 96%   BMI 38.59 kg/m²   Constitutional: Lethargic, not following commands  Lungs: respirations shallow  Heart::  RRR, distant heart tones  Abd:  Soft, non distended, bowel sounds present  Ext:  pulses present  Skin:  Warm and dry  Psych: NIMISHA  Neuro: Lethargic, not following commands    Objective data reviewed: labs, images, records, vitals, and chart    Discussed patient and the plan of care with the other IDT members: Palliative Medicine IDT Team, Floor Nurse, and Family    Time/Communication  Greater than 50% of time spent, total 55 minutes in counseling and coordination of care at the bedside regarding goals of care, diagnosis and prognosis, and see above.    Thank you for allowing Palliative Medicine to participate in the care of Len Dorsey.

## 2025-05-23 NOTE — PROGRESS NOTES
Department of Internal Medicine  Nephrology Attending Consult Note    Events reviewed    SUBJECTIVE: We are following Mr. Dorsey for FRANTZ on CKD.  Remains on BiPAP, lethargic.    PHYSICAL EXAM:      Vitals:    VITALS:  /69   Pulse (!) 108   Temp 100.1 °F (37.8 °C) (Axillary) Comment: RN notoifed  Resp 21   Ht 1.778 m (5' 10\")   Wt 122 kg (268 lb 15.4 oz)   SpO2 99%   BMI 38.59 kg/m²   24HR INTAKE/OUTPUT:    Intake/Output Summary (Last 24 hours) at 5/23/2025 1119  Last data filed at 5/23/2025 0901  Gross per 24 hour   Intake 1435.8 ml   Output 2770 ml   Net -1334.2 ml       Constitutional: Patient is on BiPAP very lethargic  HEENT: Pupils equal reactive, on BiPAP  Respiratory: Decreased breath sounds at the bases  Cardiovascular/Edema: Heart sounds are regular  Gastrointestinal: Abdomen soft  Neurologic: Unable to level  Skin: No lesion  Other: + Edema    DATA:    CBC:   Lab Results   Component Value Date/Time    WBC 12.4 05/23/2025 04:59 AM    RBC 2.97 05/23/2025 04:59 AM    HGB 7.7 05/23/2025 04:59 AM    HCT 26.3 05/23/2025 04:59 AM    MCV 88.6 05/23/2025 04:59 AM    MCH 25.9 05/23/2025 04:59 AM    MCHC 29.3 05/23/2025 04:59 AM    RDW 19.3 05/23/2025 04:59 AM     05/22/2025 05:19 AM    MPV 12.0 05/23/2025 04:59 AM     CMP:    Lab Results   Component Value Date/Time     05/23/2025 04:59 AM    K 3.8 05/23/2025 04:59 AM    K 3.9 09/15/2022 03:08 PM     05/23/2025 04:59 AM    CO2 20 05/23/2025 04:59 AM    BUN 38 05/23/2025 04:59 AM    CREATININE 2.8 05/23/2025 04:59 AM    GFRAA >60 09/15/2022 03:08 PM    LABGLOM 25 05/23/2025 04:59 AM    LABGLOM 31 03/05/2024 05:57 AM    GLUCOSE 127 05/23/2025 04:59 AM    CALCIUM 9.0 05/23/2025 04:59 AM    BILITOT 1.9 05/23/2025 04:59 AM    ALKPHOS 283 05/23/2025 04:59 AM    AST 32 05/23/2025 04:59 AM    ALT 11 05/23/2025 04:59 AM     Magnesium:    Lab Results   Component Value Date/Time    MG 1.8 05/23/2025 04:59 AM     Phosphorus:    Lab Results  in the ER CT head showed no acute abnormalities, CT abdomen pelvis showed mild colonic ileus, prominent subcutaneous edema is worsening, moderate ascites he is well was found to have a supratherapeutic INR >10, his ABGs show a pH of 7.244 with PO2 of 57, bicarbonate level of 16.3, while in the ER he developed seizure activity and therefore he was admitted to the MICU.  His initial creatinine level was 2.4 mg/dl, and has increased to 2.9 mg/dL, reason for this consultation.    IMPRESSION/RECOMMENDATIONS:      FRANTZ on CKD, hemodynamically mediated due to heart failure; renal function stable, creatinine 2.9 mg/dL, needs further diuresis, to increase Bumex drip to 0.5 mg/hour    CKD stage IIIa, probably due to nephrosclerosis, baseline creatinine 1.9-2.1 mg/dL    Hypernatremia, with hypervolemia, to continue natriuresis    HFpEF 50%, proBNP 5566, with anasarca and ascites, to continue natriuresis    Hypotension, on midodrine  Hyperchloremic metabolic acidosis, probably due to GI losses    Acidemia with metabolic and respiratory acidosis  Normocytic anemia, with iron deficiency, ferritin 64, iron saturation 10%, folate 10.2, B12 >2000  --------------------------------------  New onset seizures, on Depakote  Probably pneumonia, on ceftriaxone and doxycycline  Cirrhosis, 2/2 Alcohol abuse, on lactulose, rifaximin  CAD status post CABG, AVR and MVR mechanical valves    HLD, on atorvastatin   History of testicular cancer  History of CVA  Supratherapeutic INR  Nutrition, TF at 75 cc/hour, free water 60 cc/hour     Plan:     Continue Bumex drip to 0.5 mg/hour  Increase Free water to 100 cc/hour  Continue midodrine 5 mg 3 times daily  Continue to monitor renal function  Continue monitor sodium level  Continue IV iron, Ferrlecit 125 mg IV daily x 4       Omar Joiner MD

## 2025-05-24 NOTE — PROGRESS NOTES
Speech Language Pathology  NAME:  Len Dorsey  :  1965  DATE: 2025  ROOM:  [address] [place] [address]-A    Pt unavailable today for Speech/ Language/ Cognitive Evaluation     REASON:  Patient intubated prior to evaluation being completed please reorder evaluation when medically appropriate.        Thank You    Ruth Barron MSCCC/SLP  Speech Language Pathologist  [phone]

## 2025-05-24 NOTE — PROGRESS NOTES
Department of Internal Medicine  Nephrology Attending proress Note    Events reviewed    SUBJECTIVE: We are following Mr. Dorsey for FRANTZ on CKD.  Remains on BiPAP, lethargic.    PHYSICAL EXAM:      Vitals:    VITALS:  /60   Pulse 89   Temp 99.5 °F (37.5 °C) (Bladder)   Resp 20   Ht 1.778 m (5' 10\")   Wt 122 kg (268 lb 15.4 oz)   SpO2 98%   BMI 38.59 kg/m²   24HR INTAKE/OUTPUT:    Intake/Output Summary (Last 24 hours) at 5/24/2025 1736  Last data filed at 5/24/2025 1400  Gross per 24 hour   Intake 1398 ml   Output 6000 ml   Net -4602 ml       Constitutional: Patient is on BiPAP very lethargic  HEENT: Pupils equal reactive, on BiPAP  Respiratory: Decreased breath sounds at the bases  Cardiovascular/Edema: Heart sounds are regular  Gastrointestinal: Abdomen soft  Neurologic: Unable to level  Skin: No lesion  Other: + Edema    DATA:    CBC:   Lab Results   Component Value Date/Time    WBC 10.0 05/24/2025 04:07 AM    RBC 2.89 05/24/2025 04:07 AM    HGB 7.4 05/24/2025 04:07 AM    HCT 24.9 05/24/2025 04:07 AM    MCV 86.2 05/24/2025 04:07 AM    MCH 25.6 05/24/2025 04:07 AM    MCHC 29.7 05/24/2025 04:07 AM    RDW 19.9 05/24/2025 04:07 AM     05/23/2025 12:18 PM    MPV 11.9 05/24/2025 04:07 AM     CMP:    Lab Results   Component Value Date/Time     05/24/2025 02:20 PM    K 3.3 05/24/2025 02:20 PM    K 3.9 09/15/2022 03:08 PM     05/24/2025 02:20 PM    CO2 25 05/24/2025 02:20 PM    BUN 37 05/24/2025 02:20 PM    CREATININE 2.5 05/24/2025 02:20 PM    GFRAA >60 09/15/2022 03:08 PM    LABGLOM 29 05/24/2025 02:20 PM    LABGLOM 31 03/05/2024 05:57 AM    GLUCOSE 134 05/24/2025 02:20 PM    CALCIUM 8.8 05/24/2025 02:20 PM    BILITOT 2.1 05/24/2025 04:07 AM    ALKPHOS 286 05/24/2025 04:07 AM    AST 36 05/24/2025 04:07 AM    ALT 13 05/24/2025 04:07 AM     Magnesium:    Lab Results   Component Value Date/Time    MG 2.0 05/24/2025 04:07 AM     Phosphorus:    Lab Results   Component Value Date/Time     acute abnormalities, CT abdomen pelvis showed mild colonic ileus, prominent subcutaneous edema is worsening, moderate ascites he is well was found to have a supratherapeutic INR >10, his ABGs show a pH of 7.244 with PO2 of 57, bicarbonate level of 16.3, while in the ER he developed seizure activity and therefore he was admitted to the MICU.  His initial creatinine level was 2.4 mg/dl, and has increased to 2.9 mg/dL, reason for this consultation.    IMPRESSION/RECOMMENDATIONS:      FRANTZ on CKD, hemodynamically mediated due to heart failure; renal function stable, creatinine 2.9 mg/dL, needs further diuresis, to increase Bumex drip to 0.5 mg/hour    CKD stage IIIa, probably due to nephrosclerosis, baseline creatinine 1.9-2.1 mg/dL    Hypernatremia, with hypervolemia, to continue natriuresis    HFpEF 50%, proBNP 5566, with anasarca and ascites, to continue natriuresis    Hypotension, on midodrine  Hyperchloremic metabolic acidosis, probably due to GI losses    Acidemia with metabolic and respiratory acidosis  Normocytic anemia, with iron deficiency, ferritin 64, iron saturation 10%, folate 10.2, B12 >2000  --------------------------------------  New onset seizures, on Depakote  Probably pneumonia, on ceftriaxone and doxycycline  Cirrhosis, 2/2 Alcohol abuse, on lactulose, rifaximin  CAD status post CABG, AVR and MVR mechanical valves    HLD, on atorvastatin   History of testicular cancer  History of CVA  Supratherapeutic INR  Nutrition, TF at 75 cc/hour, free water 60 cc/hour     Plan:     Continue Bumex drip to 0.5 mg/hour  Increase Free water to 100 cc/hour  Discussed with ICU nurse.  Check K q 8 hours and replace by protocol.  Continue midodrine 5 mg 3 times daily  Continue to monitor renal function  Continue monitor sodium level  Continue IV iron, Ferrlecit 125 mg IV daily x 4

## 2025-05-24 NOTE — PROGRESS NOTES
DAILY VENTILATOR WEANING ASSESSMENT PERFORMED    P/FIO2 Ratio =261          (<100= do not Wean)                  Cs =     23                     (<32= Instability)  Plat. Pressure = 23    Was SAT completed no,     If yes proceed with the Spontaneous Breathing Trial (SBT) as long as none of the following exclusion criteria are met:     [] P/FIO2 <100  [] FiO2 >50%  [] Peep >10 CMH2O  [] Patient on vasopressor  [] Failed SAT  [] SpO2 < 88%  [] Active MI  [x] Lack of inspiratory effort     If any exclusion criteria are meet Do Not Proceed to SBT.    Was SBT completed no    Ask Physician for a weaning plan yes    Instabilities:    Cardiovascular     CNS      [] Mean BP less than or equal to 75   [] Neuromuscular blockade  [] Heart Rate greater than 130   [] RASS of -3, -4, -5  [] Mechanical Assist Device    []  RASS of +3, +4         [] ICP > 15 or Intracranial Hypertension         Respiratory      Metabolic   [] Temp. (8hrs) < 95 or > 103  [] Respiratory Rate greater than 35   [] WBC < 5000 or > 26758  [] Minute Volume greater than 15L  [] pH less than 7.30  [] Deteriorating chest X-ray         Parameters    no    NIF=  VC=  MV =  RSBI =      Additional Comments:     Performed by Td Hendrix RCP RRT

## 2025-05-24 NOTE — PROGRESS NOTES
[facility] Hospitalist Progress Note    Admitting Date and Time: 5/19/2025 10:40 AM  Admit Dx: Failure to thrive in adult [R62.7]  FRANTZ (acute kidney injury) [N17.9]  Supratherapeutic INR [R79.1]  Anticoagulated on Coumadin [Z79.01]  Alcohol withdrawal syndrome without complication (HCC) [F10.930]    Patient presented to the emergency department with concerns about not being able to take care of himself.  Patient apparently been canceled doctors appointments and not taking medications.  Has a history of alcohol abuse and is a daily drinker.  Patient states not taking his medications and he ran out of them.  He is not going to doctors appointments because he cannot get a hold of the doctor.  Also reported some shortness of breath.  Denies any fever, chills, nausea, vomiting, chest pain, headache, dizziness, abdominal pain, hematuria.,  Dysuria or constipation or diarrhea.  Vital signs within normal limits and stable.  Although reviewing his vital signs throughout his ER course he did drop to 85% on room air.  He is currently 99% on 3 L nasal cannula.  Laboratory studies demonstrate BUN 22, creatinine 2.4, procalcitonin 0.19, troponin 74 with repeat of 67, proBNP 5566, alk phos 259, hemoglobin 8.3 and a WBC of 12.3.  INR is greater than 10.  CT imaging shows some increased third spacing of fluid.  Chest x-ray shows bibasilar opacities.  Patient was started on ceftriaxone and doxycycline for presumed pneumonia.  He was also noted to have seizure-like activity for which she was given Ativan.  He was also placed on CIWA protocol.  Patient be admitted to the ICU.     Subjective:  Patient is being followed for Failure to thrive in adult [R62.7]  FRANTZ (acute kidney injury) [N17.9]  Supratherapeutic INR [R79.1]  Anticoagulated on Coumadin [Z79.01]  Alcohol withdrawal syndrome without complication (HCC) [F10.930]   Patient seen and examined.   Events reviewed.   Intubated yesterday       potassium chloride  40 mEq  ultrasound with cholelithiasis, no signs of cholecystitis, steatosis.  Continue Lactulose and Rifaximin.     Palliative care consult for goals of care    Code Status: Full Code  DVT/GI Prophylaxis: Warfarin     Dispo: Pending clinical course.      NOTE: This report was transcribed using voice recognition software. Every effort was made to ensure accuracy; however, inadvertent computerized transcription errors may be present.  Electronically signed by Ramila Chase MD on 5/24/2025 at 12:53 PM

## 2025-05-24 NOTE — PLAN OF CARE
Problem: Respiratory - Adult  Goal: Achieves optimal ventilation and oxygenation  5/24/2025 0932 by Td Hendrix RCP  Outcome: Progressing

## 2025-05-24 NOTE — PROGRESS NOTES
05/24/25 0129   Patient Observation   Pulse 90   Respirations 18   SpO2 100 %   Breath Sounds   Right Upper Lobe Diminished   Right Middle Lobe Diminished   Right Lower Lobe Diminished   Left Upper Lobe Diminished   Left Lower Lobe Diminished   Vent Information   Ventilator -51   Vent Mode AC/VC   $Ventilation $Subsequent Day   Ventilator Settings   Vt (Set, mL) 450 mL   Resp Rate (Set) 18 bpm   PEEP/CPAP (cmH2O) 5   FiO2  40 %   Peak Inspiratory Flow (Set) 60 L/sec   Vent Patient Data (Readings)   Vt (Measured) 430 mL   Peak Inspiratory Pressure (cmH2O) 26 cmH2O   Rate Measured 18 br/min   Minute Volume (L/min) 7.71 Liters   Mean Airway Pressure (cmH2O) 11 cmH20   Plateau Pressure (cm H2O) 23 cm H2O   Driving Pressure 18   I:E Ratio 1:3.10   Flow Sensitivity 3 L/min   Vent Alarm Settings   High Pressure (cmH2O) 40 cmH2O   Low Minute Volume (lpm) 5 L/min   High Minute Volume (lpm) 15 L/min   Low Exhaled Vt (ml) 300 mL   High Exhaled Vt (ml) 800 mL   RR High (bpm) 35 br/min   Apnea (secs) 20 secs   Additional Respiratoray Assessments   Humidification Source Heated wire   Humidification Temp 37   Circuit Condensation Not drained   Ambu Bag With Mask At Bedside Yes   ETT    Placement Date/Time: 05/23/25 1513   Placement Verified By: Capnometry;Chest X-ray  Preoxygenation: Yes  Mask Ventilation: Ventilated by mask (1)  Airway Type: Cuffed  Airway Tube Size: 8 mm  Location: Oral  Insertion attempts: 1  Secured At: 25 cm  M...   Secured At 25 cm   Measured From Lips   ETT Placement Center   Secured By Commercial tube hinton   Site Assessment Dry

## 2025-05-24 NOTE — PROGRESS NOTES
Department of Internal Medicine  Infectious Diseases  Progress Note      C/C:  Fever , leukocytosis     All above noted  Pt's intubated  Sedated  Temp      Current Facility-Administered Medications   Medication Dose Route Frequency Provider Last Rate Last Admin    potassium chloride 40 mEq in 100 mL IVPB (Central Line)  40 mEq IntraVENous Once Taiwo Sullivan MD 25 mL/hr at 05/24/25 0905 40 mEq at 05/24/25 0905    warfarin (COUMADIN) tablet 0.5 mg  0.5 mg Oral Once Santo Long MD        vancomycin (VANCOCIN) 1,500 mg in sodium chloride 0.9 % 250 mL IVPB ([id number])  1,500 mg IntraVENous Once Mikael Redding MD        dextrose 5 % solution   IntraVENous Continuous Aurelio Montoya MD 75 mL/hr at 05/24/25 0904 New Bag at 05/24/25 0904    aztreonam (AZACTAM) 1,000 mg in sodium chloride 0.9 % 50 mL IVPB  1,000 mg IntraVENous Q12H Mikael Redding MD   Stopped at 05/24/25 0415    vancomycin (VANCOCIN) intermittent dosing (placeholder)   Other RX Placeholder Mikael Redding MD        chlorhexidine (PERIDEX) 0.12 % solution 15 mL  15 mL Mouth/Throat BID Aureloi Montoya MD   15 mL at 05/24/25 0845    fentaNYL (SUBLIMAZE) 1,000 mcg in sodium chloride 0.9% 100 mL infusion   mcg/hr IntraVENous Continuous Aurelio Montoya MD 7.6 mL/hr at 05/24/25 0548 76 mcg/hr at 05/24/25 0548    thiamine (B-1) injection 250 mg  250 mg IntraVENous Daily Aurelio Montoya MD   250 mg at 05/24/25 0846    Followed by    [START ON 5/26/2025] thiamine (B-1) injection 100 mg  100 mg IntraVENous Daily Aurelio Montoya MD        lactulose (CHRONULAC) 10 GM/15ML solution 20 g  20 g Oral TID Jaja Moreno MD   20 g at 05/24/25 0844    white petrolatum ointment   Topical BID PRN Bay Eugene MD        ferric gluconate (FERRLECIT) 125 mg in sodium chloride 0.9 % 100 mL IVPB  125 mg IntraVENous Daily Omar Joiner MD   Stopped at 05/24/25 1007    midodrine (PROAMATINE) tablet 5 mg  5 mg Oral TID  Jaja Moreno MD   5 mg at  \"[id number]\"    MICROBIOLOGY:    Blood culture - neg to date     Sputum culture - normal shira     Radiology :    CT head - 5/19 /2025    1. Abdominopelvic: Increased 3rd spacing of fluid.  Mild colonic ileus.   Incidental findings are described above.   2. Head: No acute intracranial abnormality.  Chronic ischemic changes   redemonstrated.       IMPRESSION:     Resp failure - intubated   Encephalopathy   CHF   Hx of mitral and aortic valve replacement , pacer insertion   Low grade fever and leukocytosis , hypernatremia       RECOMMENDATIONS:      IV azactam 1 gram q  12 hrs, vanco intermittent   Await cx

## 2025-05-24 NOTE — PROGRESS NOTES
Nutrition Note    Tube feeding recommendation per physician consult.    Recommend Standard with Fiber (Jevity 1.5) @65ml/hr plus 1 protein modular daily to provide 1560ml, 2340 calories, 100g protein, 957ml water (2412 calories and 118g protein w/ protein modular daily), with flushes per critical care.    See full nutrition assessment on 5/22 if needed.    Estimated Daily Nutrient Needs:  Energy Requirements Based On: Formula  Weight Used for Energy Requirements: Current  Energy (kcal/day): PS3B [id number] (REE 2265 ; minute volume 9.33 ; Tmax 99.1)   Weight Used for Protein Requirements: Ideal  Protein (g/day): 1.5-1.8 g/kg IBW,110-140  Method Used for Fluid Requirements: Defer to provider  Fluid (ml/day): per critical care      Electronically signed by Wilber Roger RD, LD on 5/24/25 at 9:20 AM EDT    Contact: [phone]

## 2025-05-24 NOTE — PLAN OF CARE
Problem: Chronic Conditions and Co-morbidities  Goal: Patient's chronic conditions and co-morbidity symptoms are monitored and maintained or improved  5/24/2025 0942 by Amador Rose RN  Outcome: Progressing  5/23/2025 2141 by Toña Yarbrough RN  Outcome: Progressing  Flowsheets (Taken 5/23/2025 2000)  Care Plan - Patient's Chronic Conditions and Co-Morbidity Symptoms are Monitored and Maintained or Improved:   Monitor and assess patient's chronic conditions and comorbid symptoms for stability, deterioration, or improvement   Update acute care plan with appropriate goals if chronic or comorbid symptoms are exacerbated and prevent overall improvement and discharge   Collaborate with multidisciplinary team to address chronic and comorbid conditions and prevent exacerbation or deterioration     Problem: Discharge Planning  Goal: Discharge to home or other facility with appropriate resources  5/24/2025 0942 by Amador Rose RN  Outcome: Progressing  5/23/2025 2141 by Toña Yarbrough RN  Outcome: Progressing  Flowsheets (Taken 5/23/2025 2000)  Discharge to home or other facility with appropriate resources:   Identify barriers to discharge with patient and caregiver   Arrange for needed discharge resources and transportation as appropriate   Identify discharge learning needs (meds, wound care, etc)     Problem: Safety - Adult  Goal: Free from fall injury  5/24/2025 0942 by Amador Rsoe RN  Outcome: Progressing  5/23/2025 2141 by Toña Yarbrough RN  Outcome: Progressing  Flowsheets (Taken 5/23/2025 2000)  Free From Fall Injury:   Instruct family/caregiver on patient safety   Based on caregiver fall risk screen, instruct family/caregiver to ask for assistance with transferring infant if caregiver noted to have fall risk factors     Problem: Pain  Goal: Verbalizes/displays adequate comfort level or baseline comfort level  5/24/2025 0942 by Amador Rose RN  Outcome: Progressing  5/23/2025 2141 by Zenon  underlying metabolic abnormalities, dehydration, psychiatric diagnoses, and notify attending LIP2. [place] high risk fall precautions, as indicated3. Provide frequent short contacts to provide reality reorientation, refocusing and direction4. Decrease environmental stimuli, including noise as appropriate5. Monitor and intervene to maintain adequate nutrition, hydration, elimination, sleep and activity6. If unable to ensure safety without constant attention obtain sitter and review sitter guidelines with assigned personnel7. Initiate Psychosocial CNS and Spiritual Care consult, as indicated  INTERVENTIONS:1. Assess for possible contributors to thought disturbance, including medications, impaired vision or hearing, underlying metabolic abnormalities, dehydration, psychiatric diagnoses, and notify attending LIP2. [place] high risk fall precautions, as indicated3. Provide frequent short contacts to provide reality reorientation, refocusing and direction4. Decrease environmental stimuli, including noise as appropriate5. Monitor and intervene to maintain adequate nutrition, hydration, elimination, sleep and activity6. If unable to ensure safety without constant attention obtain sitter and review sitter guidelines with assigned personnel7. Initiate Psychosocial CNS and Spiritual Care consult, as indicated  INTERVENTIONS:1. Assess for possible contributors to thought disturbance, including medications, impaired vision or hearing, underlying metabolic abnormalities, dehydration, psychiatric diagnoses, and notify attending LIP2. [place] high risk fall precautions, as indicated3. Provide frequent short contacts to provide reality reorientation, refocusing and direction4. Decrease environmental stimuli, including noise as appropriate5. Monitor and intervene to maintain adequate nutrition, hydration, elimination, sleep and activity6. If unable to ensure safety without constant attention obtain sitter and review sitter

## 2025-05-24 NOTE — PROGRESS NOTES
Pharmacy Consultation Note  (Antibiotic Dosing and Monitoring)    Initial consult date: 5/23/2025  Consulting physician/provider: Dr. Redding  Drug: Vancomycin  Indication: Fever/Leukocytosis    Age/  Gender Height Weight IBW  Allergy Information   59 y.o./male 177.8 cm (5' 10\") 99.8 kg (220 lb)     Ideal body weight: 73 kg (160 lb 15 oz)  Adjusted ideal body weight: 92.6 kg (204 lb 2.3 oz)   Patient has no known allergies.      Renal Function:  Recent Labs     05/23/25 2001 05/24/25  0226 05/24/25  0407   BUN 39* 38* 38*   CREATININE 2.8* 2.7* 2.5*       Intake/Output Summary (Last 24 hours) at 5/24/2025 0747  Last data filed at 5/24/2025 0635  Gross per 24 hour   Intake 1800.48 ml   Output 3900 ml   Net -2099.52 ml       Vancomycin Monitoring:  Trough:  No results for input(s): \"VANCOTROUGH\" in the last 72 hours.  Random:    Recent Labs     05/24/25  0407   VANCORANDOM 20.5       Vancomycin Administration Times:  Recent vancomycin administrations                     vancomycin (VANCOCIN) 2000 mg in 0.9% sodium chloride 500 mL IVPB (mg) 2,000 mg New Bag 05/23/25 2779                  Assessment:  Patient is a 59 y.o. male who has been initiated on vancomycin  Estimated Creatinine Clearance: 42 mL/min (A) (based on SCr of 2.5 mg/dL (H)).  Random level today = 20.5 mcg/mL    Plan:  Vancomycin 1500mg x 1 this afternoon   Will check vancomycin random level tomorrow AM   Will continue to monitor renal function   Pharmacy to follow      Merari Van, PharmD, BCPS, BCCCP 5/24/2025 7:47 AM      ZHOU: [phone]  SEY: [phone]  SJW: [phone]

## 2025-05-24 NOTE — PROGRESS NOTES
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     Weight:   Wt Readings from Last 1 Encounters:   05/22/25 122 kg (268 lb 15.4 oz)       TSH:    Lab Results   Component Value Date/Time    TSH 2.98 05/20/2025 04:31 AM       Hepatic Function Panel:                            Lab Results   Component Value Date/Time    ALKPHOS 286 05/24/2025 04:07 AM    ALT 13 05/24/2025 04:07 AM    AST 36 05/24/2025 04:07 AM    BILITOT 2.1 05/24/2025 04:07 AM    BILIDIR 1.5 05/23/2025 12:18 PM    IBILI 0.5 05/23/2025 12:18 PM         Recent Labs     05/21/25  1823 05/22/25  0519 05/23/25  0459 05/23/25  1218 05/24/25  0407   HGB 7.4* 7.8* 7.7* 7.7* 7.4*   * 126*  --  135  --      Date Warfarin Dose INR Heparin or LMWH Comment   5/20 1 mg 3.2 -- VitK 10 mg x1 on 5/19  Meropenem started   5/21 1 mg- medication held  2 -- Warfarin held for IR procedure   5/22 1.5 mg 2 --    5/23 1mg  2.4 --    5/24 0.5 mg 2.9 --      Assessment:  Patient is a 59 y.o. male on warfarin for Atrial Fibrillation, AVR, and mMVR.  Patient's home warfarin dosing regimen is 1 mg daily - there is some questions surrounding compliance.   Goal INR 2.5 - 3.5  INR 2.9    Plan:  Warfarin 0.5 mg x 1 tonight  Daily PT/INR until the INR is stable within the therapeutic range  Pharmacist will follow and monitor/adjust dosing as necessary    Thank you for this consult,    Merari Van, PharmD, BCPS, BCCCP 5/24/2025 7:43 AM  [phone]

## 2025-05-24 NOTE — PLAN OF CARE
Problem: Chronic Conditions and Co-morbidities  Goal: Patient's chronic conditions and co-morbidity symptoms are monitored and maintained or improved  5/23/2025 2141 by Toña Yarbrough RN  Outcome: Progressing  Flowsheets (Taken 5/23/2025 2000)  Care Plan - Patient's Chronic Conditions and Co-Morbidity Symptoms are Monitored and Maintained or Improved:   Monitor and assess patient's chronic conditions and comorbid symptoms for stability, deterioration, or improvement   Update acute care plan with appropriate goals if chronic or comorbid symptoms are exacerbated and prevent overall improvement and discharge   Collaborate with multidisciplinary team to address chronic and comorbid conditions and prevent exacerbation or deterioration     Problem: Discharge Planning  Goal: Discharge to home or other facility with appropriate resources  5/23/2025 2141 by Toña Yarbrough, RN  Outcome: Progressing  Flowsheets (Taken 5/23/2025 2000)  Discharge to home or other facility with appropriate resources:   Identify barriers to discharge with patient and caregiver   Arrange for needed discharge resources and transportation as appropriate   Identify discharge learning needs (meds, wound care, etc)     Problem: Safety - Adult  Goal: Free from fall injury  5/23/2025 2141 by Toña Yarbrough, RN  Outcome: Progressing     Problem: Pain  Goal: Verbalizes/displays adequate comfort level or baseline comfort level  5/23/2025 2141 by Toña Yarbrough RN  Outcome: Progressing     Problem: Safety - Medical Restraint  Goal: Remains free of injury from restraints (Restraint for Interference with Medical Device)  Description: INTERVENTIONS:1. Determine that other, less restrictive measures have been tried or would not be effective before applying the restraint2. Evaluate the patient's condition at the time of restraint application3. Inform patient/family regarding the reason for restraint4. Q2H: Monitor safety, psychosocial status, comfort, nutrition and  catheter remains patent  5/23/2025 2141 by Toña Yarbrough, RN  Outcome: Progressing  Flowsheets (Taken 5/23/2025 2000)  Urinary catheter remains patent:   Irrigate catheter per Licensed Independent Practitioner order if indicated and notify Licensed Independent Practitioner if unable to irrigate   Assess patency of urinary catheter   Assess need for a larger catheter size or a 3-way catheter for continuous bladder irrigation     Problem: Metabolic/Fluid and Electrolytes - Adult  Goal: Hemodynamic stability and optimal renal function maintained  5/23/2025 2141 by Toña Yarbrough RN  Outcome: Progressing  Flowsheets (Taken 5/23/2025 2000)  Hemodynamic stability and optimal renal function maintained:   Monitor labs and assess for signs and symptoms of volume excess or deficit   Monitor urine specific gravity, serum osmolarity and serum sodium as indicated or ordered   Monitor response to interventions for patient's volume status, including labs, urine output, blood pressure (other measures as available)   Encourage oral intake as appropriate   Monitor intake, output and patient weight     Problem: Confusion  Goal: Confusion, delirium, dementia, or psychosis is improved or at baseline  Description: INTERVENTIONS:1. Assess for possible contributors to thought disturbance, including medications, impaired vision or hearing, underlying metabolic abnormalities, dehydration, psychiatric diagnoses, and notify attending LIP2. [place] high risk fall precautions, as indicated3. Provide frequent short contacts to provide reality reorientation, refocusing and direction4. Decrease environmental stimuli, including noise as appropriate5. Monitor and intervene to maintain adequate nutrition, hydration, elimination, sleep and activity6. If unable to ensure safety without constant attention obtain sitter and review sitter guidelines with assigned personnel7. Initiate Psychosocial CNS and Spiritual Care consult, as  indicated  INTERVENTIONS:1. Assess for possible contributors to thought disturbance, including medications, impaired vision or hearing, underlying metabolic abnormalities, dehydration, psychiatric diagnoses, and notify attending LIP2. [place] high risk fall precautions, as indicated3. Provide frequent short contacts to provide reality reorientation, refocusing and direction4. Decrease environmental stimuli, including noise as appropriate5. Monitor and intervene to maintain adequate nutrition, hydration, elimination, sleep and activity6. If unable to ensure safety without constant attention obtain sitter and review sitter guidelines with assigned personnel7. Initiate Psychosocial CNS and Spiritual Care consult, as indicated  INTERVENTIONS:1. Assess for possible contributors to thought disturbance, including medications, impaired vision or hearing, underlying metabolic abnormalities, dehydration, psychiatric diagnoses, and notify attending LIP2. [place] high risk fall precautions, as indicated3. Provide frequent short contacts to provide reality reorientation, refocusing and direction4. Decrease environmental stimuli, including noise as appropriate5. Monitor and intervene to maintain adequate nutrition, hydration, elimination, sleep and activity6. If unable to ensure safety without constant attention obtain sitter and review sitter guidelines with assigned personnel7. Initiate Psychosocial CNS and Spiritual Care consult, as indicated  INTERVENTIONS:1. Assess for possible contributors to thought disturbance, including medications, impaired vision or hearing, underlying metabolic abnormalities, dehydration, psychiatric diagnoses, and notify attending LIP2. [place] high risk fall precautions, as indicated3. Provide frequent short contacts to provide reality reorientation, refocusing and direction4. Decrease environmental stimuli, including noise as appropriate5. Monitor and intervene to maintain adequate nutrition,

## 2025-05-25 NOTE — PROCEDURES
PROCEDURE NOTE  Date: 5/25/2025   Name: Len Dorsey  YOB: 1965    Procedures      [facility]  Catheterization Procedure Note    Indication:  Hemodynamic Monitoring                Time Out:  Completed immediately prior to the start of the procedure which included verification of the correct patient, correct site and agreement on the procedure to be done.    Consent:  The indications, risks, benefits, alternatives to the procedure were explained to the patient/surrogate decision maker and their questions answered. Consent was obtained to proceed with the procedure.    Type of Central Line Being Placed:   Pulmonary Artery    Location:   Right Internal Jugular Vein    Central Line Bundle:  I washed/disinfected my hands prior to starting the procedure  Proceduralist(s) wore the following PPE throughout the procedure: hat, mask, sterile gown, & sterile gloves  Everyone in the room wore a mask during the procedure  A full body drape was used to cover the patient  Chlorhexidine was utilized to prep the site and allowed to dry completely    Ultrasound Guidance:   No    Anesthetic Used:  Lidocaine    Sterile Technique Maintained Throughout Procedure:  Yes    Description/Findings:   Dark, non-pulsatile blood return obtained from all lumens    Estimated Blood Loss:  < 5ml    Complications:  No apparent complications    Follow-up Chest X-ray:  Ordered.     Assistant(s):  Not applicable    Supervision:  No supervision required    Findings-     RA pressure- 27/3 (13)  RV Pressure- 39/1 (19)  PA pressure -39/17 (26)  PCwP- 14  Mixed venous sats-  %      CI- 3.7    Pulmonary vascular resistance- 2.0 WEBER    Transpulmonary gradient- 12    Impression-   No evidence of volume overload   Normal cardiac output   Cardiac cause for hypotension or Hypoxia is ruled out     Recommendations-   Back off/ DC IV diuresis gtt  Continue sepsis protocol   Aspiration Precautions   Continue antibiotics, steroids

## 2025-05-25 NOTE — PROCEDURES
Central Line Insertion     Procedure: left femoral vein Triple Lumen Catheter placement.    Indications: vascular access    Consent: The primary decision maker counseled regarding the procedure, its indications, risks, potential complications and alternatives, and any questions were answered. Consent was obtained to proceed.    Number of sticks: 1    Number of Kits used: 1    Procedure: Time Out: Immediately prior to the procedure a \"timeout\" was called to verify the correct patient and procedure. The skin over the left femoral vein was prepped with betadine and draped in a sterile fashion. Local anesthesia was obtained by infiltration using 1% Lidocaine without epinephrine.  With Ultrasound guidance a large bore needle was used to identify the vein, dark non pulsatile blood returned. The guide wire was then inserted through the needle with minimal resistance. 2 mm nick was made in the skin beside the guidewire. Then a dilator was inserted and removed. A triple lumen catheter was then inserted into the vessel over the guide wire using the Seldinger technique. All ports showed good, free flowing blood return and were flushed with saline solution.  The catheter was then securely fastened to the skin with sutures and covered with a bio patch and sterile dressing.    Complications: None   The patient tolerated the procedure well.    Estimated blood loss: 5 ml.    Aurelio Montoya MD PGY-1  5/25/2025  2:52 PM    Attending:  Dr. Eugene.

## 2025-05-25 NOTE — PLAN OF CARE
Problem: Safety - Medical Restraint  Goal: Remains free of injury from restraints (Restraint for Interference with Medical Device)  Description: INTERVENTIONS:1. Determine that other, less restrictive measures have been tried or would not be effective before applying the restraint2. Evaluate the patient's condition at the time of restraint application3. Inform patient/family regarding the reason for restraint4. Q2H: Monitor safety, psychosocial status, comfort, nutrition and hydration  Recent Flowsheet Documentation  Taken 5/24/2025 2000 by Lori Kelley RN  Remains free of injury from restraints (restraint for interference with medical device): Every 2 hours: Monitor safety, psychosocial status, comfort, nutrition and hydration     Problem: Skin/Tissue Integrity  Goal: Skin integrity remains intact  Description: 1.  Monitor for areas of redness and/or skin breakdown2.  Assess vascular access sites hourly3.  Every 4-6 hours minimum:  Change oxygen saturation probe site4.  Every 4-6 hours:  If on nasal continuous positive airway pressure, respiratory therapy assess nares and determine need for appliance change or resting period  Outcome: Progressing     Problem: Respiratory - Adult  Goal: Achieves optimal ventilation and oxygenation  Outcome: Progressing     Problem: Skin/Tissue Integrity - Adult  Goal: Skin integrity remains intact  Description: 1.  Monitor for areas of redness and/or skin breakdown2.  Assess vascular access sites hourly3.  Every 4-6 hours minimum:  Change oxygen saturation probe site4.  Every 4-6 hours:  If on nasal continuous positive airway pressure, respiratory therapy assess nares and determine need for appliance change or resting period  Outcome: Progressing     Problem: Genitourinary - Adult  Goal: Urinary catheter remains patent  Outcome: Progressing

## 2025-05-25 NOTE — PROGRESS NOTES
DAILY VENTILATOR WEANING ASSESSMENT PERFORMED    P/FIO2 Ratio =246          (<100= do not Wean)                  Cs =     26                     (<32= Instability)  Plat. Pressure = 22    Was SAT completed yes,     If yes proceed with the Spontaneous Breathing Trial (SBT) as long as none of the following exclusion criteria are met:     [] P/FIO2 <100  [] FiO2 >50%  [] Peep >10 CMH2O  [] Patient on vasopressor  [] Failed SAT  [] SpO2 < 88%  [] Active MI  [x] Lack of inspiratory effort     If any exclusion criteria are meet Do Not Proceed to SBT.    Was SBT completed no    Ask Physician for a weaning plan yes    Instabilities:    Cardiovascular     CNS      [x] Mean BP less than or equal to 75   [] Neuromuscular blockade  [] Heart Rate greater than 130   [] RASS of -3, -4, -5  [] Mechanical Assist Device    []  RASS of +3, +4         [] ICP > 15 or Intracranial Hypertension         Respiratory      Metabolic   [] Temp. (8hrs) < 95 or > 103  [] Respiratory Rate greater than 35   [] WBC < 5000 or > 63524  [] Minute Volume greater than 15L  [] pH less than 7.30  [] Deteriorating chest X-ray         Parameters    no    NIF=  VC=  MV =  RSBI =      Additional Comments:     Performed by Td Hednrix RCP RRT

## 2025-05-25 NOTE — PROGRESS NOTES
Duran Pederson numbers:     CVP 27/3 (13)   RV 39/0 (18)  PA 39/17 (26)  Wedge #1 14   Wedge #2 13    CO 9.17  CI 3.88    SVI 45

## 2025-05-25 NOTE — PLAN OF CARE
Problem: Chronic Conditions and Co-morbidities  Goal: Patient's chronic conditions and co-morbidity symptoms are monitored and maintained or improved  5/25/2025 1345 by Amador Rose RN  Outcome: Progressing  5/25/2025 1345 by Amador Rose RN  Outcome: Progressing     Problem: Discharge Planning  Goal: Discharge to home or other facility with appropriate resources  5/25/2025 1345 by Amador Rose RN  Outcome: Progressing  5/25/2025 1345 by Amador Rose RN  Outcome: Progressing     Problem: Safety - Adult  Goal: Free from fall injury  5/25/2025 1345 by Amador Rose RN  Outcome: Progressing  5/25/2025 1345 by Amador Rose RN  Outcome: Progressing     Problem: Pain  Goal: Verbalizes/displays adequate comfort level or baseline comfort level  5/25/2025 1345 by Amador Rose RN  Outcome: Progressing  5/25/2025 1345 by Amador Rose RN  Outcome: Progressing     Problem: Safety - Medical Restraint  Goal: Remains free of injury from restraints (Restraint for Interference with Medical Device)  Description: INTERVENTIONS:1. Determine that other, less restrictive measures have been tried or would not be effective before applying the restraint2. Evaluate the patient's condition at the time of restraint application3. Inform patient/family regarding the reason for restraint4. Q2H: Monitor safety, psychosocial status, comfort, nutrition and hydration  5/25/2025 1345 by Amador Rose RN  Outcome: Progressing  5/25/2025 1345 by Amador Rose RN  Outcome: Progressing     Problem: Skin/Tissue Integrity  Goal: Skin integrity remains intact  Description: 1.  Monitor for areas of redness and/or skin breakdown2.  Assess vascular access sites hourly3.  Every 4-6 hours minimum:  Change oxygen saturation probe site4.  Every 4-6 hours:  If on nasal continuous positive airway pressure, respiratory therapy assess nares and determine need for appliance change or resting period  5/25/2025  unable to ensure safety without constant attention obtain sitter and review sitter guidelines with assigned personnel7. Initiate Psychosocial CNS and Spiritual Care consult, as indicated  5/25/2025 1345 by Amador Rose RN  Outcome: Progressing  5/25/2025 1345 by Amador Rose RN  Outcome: Progressing     Problem: ABCDS Injury Assessment  Goal: Absence of physical injury  5/25/2025 1345 by Amador Rose RN  Outcome: Progressing  5/25/2025 1345 by Amador Rose RN  Outcome: Progressing

## 2025-05-25 NOTE — PROGRESS NOTES
[place] [facility] catheterization - hemodynamics     Findings-     RA pressure- 27/3 (13)  RV Pressure- 39/1 (19)  PA pressure -39/17 (26)  PCwP- 14  Mixed venous sats-  %      CI- 3.7    Pulmonary vascular resistance- 2.0 WEBER    Transpulmonary gradient- 12    Impression-   No evidence of volume overload   Normal cardiac output   Cardiac cause for hypotension or Hypoxia is ruled out     Recommendations-   Back off/ DC IV diuresis gtt  Continue sepsis protocol   Aspiration Precautions   Continue antibiotics, steroids

## 2025-05-25 NOTE — PLAN OF CARE
Problem: Respiratory - Adult  Goal: Achieves optimal ventilation and oxygenation  5/25/2025 0854 by Td Hendrix RCP  Outcome: Progressing

## 2025-05-25 NOTE — PROGRESS NOTES
[facility]   Department of Internal Medicine   Internal Medicine Residency  MICU Progress Note    Patient:  Len Dorsey 59 y.o. male   MRN: [id number]       Date of Service: 2025   Admission date: 2025 10:40 AM ; Hospital day: 6   ICU day: 1d 19h    Allergy: Patient has no known allergies.    Subjective     Overnight:  K-3.3; KCL- 40 meq given   RUQ US - cholelithiasis w/o acute cholecystitis + concerns for steatosis  LUE US- neg for DVT  BMP: Na 149, K 3.3  Started on D5 for 8 more hours   3 watery BM- FMS placed   K and Mg replaced     Today: Patient was seen and examined at bedside.  He is intubated and sedated, moving on voice stimuli.    Objective   PHYSICAL EXAM  General Appearance: intubated and sedated  HEENT: Normocephalic, atraumatic  Neck: midline trachea  Lung: Crackles heard bilaterally and breath sound is diminished on the left base  Heart: murmur heard and irregular rhythm, normal rate  Abdomen: soft, bowel sounds normal; no masses  Extremities: 2+ pitting edema seen bilaterally. Left arm swelling and edema observed   Musculokeletal: No joint swelling  Neurologic: Mental status: intubated and sedated    CARDIOVASCULAR  Pulse rate range      : Pulse  Av.5  Min: 87  Max: 100  BP range                  : Systolic (24hrs), Av , Min:87 , Max:124   ; Diastolic (24hrs), Av, Min:42, Max:78    Arterial BP       Systolic BP/Diastolic BP & MAP            PULMONARY  Respiration range   : Resp  Av.2  Min: 18  Max: 19  Pulse Ox range : SpO2  Av.4 %  Min: 97 %  Max: 100 %  Recent Labs     25  1934 257 25   PH 7.427 7.441 7.355   PCO2 36.4 39.1 46.6*   PO2 109.2* 104.2* 98.6   HCO3 23.5 26.0 25.4   BE -0.7 1.8 -0.2   O2SAT 98.2 97.8 97.2   THB 8.1* 8.8* 8.3*        Mechanical ventilation:  ET tube size:  Vent Settings  FiO2 : 40 %  Resp Rate (Set): 18 bpm  Vt (Set, mL): 450 mL  PEEP/CPAP (cmH2O): 5  Insp Rise Time (%): 2 %  Flow     (Results Pending)        Resident's Assessment and Plan     Assessment and Plan:    Assessment:  Acute encephalopathy secondary to seizure activity vs alcoholic vs metabolic  EEG done showed no seizures or epileptiform changes but moderate nonspecific encephalopathy  Elevated ammonia levels  B12 and folate normal  Acute hypoxic respiratory failure s/p intubation and mechanical ventilation secondary to CHF exacerbation vs infectious  Leukocytosis, likely infectious  Hyperammonemia on lactulose and rifaximin  Elevated alkaline phosphatase and Direct and total bilirubin levels, R/O cholecystitis vs acute cholangitis   Elevated pro-BNP likely secondary to acute HF exacerbation  FRANTZ on CKD, baseline Cr 1.6   Elevated troponin likely secondary demand ischemia   Supratherapeutic INR, s/p Vitamin K 10 mg IV, resolved   Hypernatremia  Valvular heart disease s/p mechanical AVR and MVR in 2020 with redo in 2023 due to endocarditis, on warfarin 1 mg QD  Acute on chronic COR pulmonale  Coronary artery disease s/p CABG in 9/2020, LIMA to LAD  HFimpEF, 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% (2/19/2025)  EF recovered to 50% (2/19/2025) from 40-45% (5/10/2024)  History of complete heart block s/p dual chamber permanent pacemaker [employer] in 2020 at   Hypertension  COPD  Normal anion gap metabolic acidosis  Alcoholic liver cirrhosis and hepatic steatosis  History of colon polyps  Testicular cancer s/p chemotherapy  Iron deficiency anemia  Hypoalbuminemia    Plan:    S/P intubation and mechanical ventilation, obtain cxray ABG daily while intubated  Wean off sedation and daily SAT and SBT  Continue diuresis, currently on bumex gttt 1 mg/ hr  Continue D5 for hypernatremia  Follow infectious workup: Negative so far  Follow RUQ US  Continue lactulose and rifaximin  Continue Keppra 500 mg BID   Continue high dose thiamine protocol and folic acid  Maintain on seizure precaution  Currently at goal therapeutic INR, Monitor PT/INR daily  Continue antibiotics, currently

## 2025-05-25 NOTE — PROGRESS NOTES
Department of Internal Medicine  Infectious Diseases  Progress Note      C/C:  Fever , leukocytosis     All above noted  Pt is intubated, sedated   Low grade temp     Current Facility-Administered Medications   Medication Dose Route Frequency Provider Last Rate Last Admin    warfarin (COUMADIN) tablet 0.5 mg  0.5 mg Oral Once Santo Long MD        aztreonam (AZACTAM) 1,000 mg in sodium chloride 0.9 % 50 mL IVPB  1,000 mg IntraVENous Q12H Mikael Redding MD   Stopped at 05/25/25 0426    vancomycin (VANCOCIN) intermittent dosing (placeholder)   Other RX Placeholder Mikael Redding MD        chlorhexidine (PERIDEX) 0.12 % solution 15 mL  15 mL Mouth/Throat BID Aurelio Montoya MD   15 mL at 05/25/25 0807    fentaNYL (SUBLIMAZE) 1,000 mcg in sodium chloride 0.9% 100 mL infusion   mcg/hr IntraVENous Continuous Aurelio Montoya MD 7.5 mL/hr at 05/25/25 1011 75 mcg/hr at 05/25/25 1011    [START ON 5/26/2025] thiamine (B-1) injection 100 mg  100 mg IntraVENous Daily Aurelio Montoya MD        lactulose (CHRONULAC) 10 GM/15ML solution 20 g  20 g Oral TID Jaja Moreno MD   20 g at 05/25/25 0807    white petrolatum ointment   Topical BID PRN Bay Eugene MD        ferric gluconate (FERRLECIT) 125 mg in sodium chloride 0.9 % 100 mL IVPB  125 mg IntraVENous Daily Omar Joiner  mL/hr at 05/25/25 1051 125 mg at 05/25/25 1051    midodrine (PROAMATINE) tablet 5 mg  5 mg Oral TID WC Jaja Moreno MD   5 mg at 05/25/25 0807    bumetanide (BUMEX) 12.5 mg in sodium chloride 0.9 % 125 mL infusion  1 mg/hr IntraVENous Continuous Aurelio Montoya MD 10 mL/hr at 05/25/25 0302 1 mg/hr at 05/25/25 0302    warfarin placeholder: dosing by pharmacy   Oral RX Placeholder Santo Long MD        atorvastatin (LIPITOR) tablet 20 mg  20 mg Oral Nightly Carissa Good APRN - CNP   20 mg at 05/24/25 1959    [Held by provider] bumetanide (BUMEX) tablet 2 mg  2 mg Oral Daily Carissa Good APRN - CNP

## 2025-05-25 NOTE — PROGRESS NOTES
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     Weight:   Wt Readings from Last 1 Encounters:   05/22/25 122 kg (268 lb 15.4 oz)       TSH:    Lab Results   Component Value Date/Time    TSH 2.98 05/20/2025 04:31 AM       Hepatic Function Panel:                            Lab Results   Component Value Date/Time    ALKPHOS 308 05/25/2025 04:06 AM    ALT 10 05/25/2025 04:06 AM    AST 42 05/25/2025 04:06 AM    BILITOT 1.6 05/25/2025 04:06 AM    BILIDIR 1.5 05/23/2025 12:18 PM    IBILI 0.5 05/23/2025 12:18 PM         Recent Labs     05/23/25  1218 05/24/25  0407 05/25/25  0406   HGB 7.7* 7.4* 7.2*     --  112*     Date Warfarin Dose INR Heparin or LMWH Comment   5/20 1 mg 3.2 -- VitK 10 mg x1 on 5/19  Meropenem started   5/21 1 mg- medication held  2 -- Warfarin held for IR procedure   5/22 1.5 mg 2 --    5/23 1mg  2.4 --    5/24 0.5 mg 2.9 --    5/25 0.5 mg 2.9 --             Assessment:  Patient is a 59 y.o. male on warfarin for Atrial Fibrillation, AVR, and mMVR.  Patient's home warfarin dosing regimen is 1 mg daily - there is some questions surrounding compliance.   Goal INR 2.5 - 3.5  INR 2.9    Plan:  Warfarin 0.5 mg x 1 tonight  Daily PT/INR until the INR is stable within the therapeutic range  Pharmacist will follow and monitor/adjust dosing as necessary    Thank you for this consult,    Merari Van, PharmD, BCPS, BCCCP 5/25/2025 6:50 AM  [phone]

## 2025-05-25 NOTE — PROGRESS NOTES
[facility] Hospitalist Progress Note    Admitting Date and Time: 5/19/2025 10:40 AM  Admit Dx: Failure to thrive in adult [R62.7]  FRANTZ (acute kidney injury) [N17.9]  Supratherapeutic INR [R79.1]  Anticoagulated on Coumadin [Z79.01]  Alcohol withdrawal syndrome without complication (HCC) [F10.930]    Patient presented to the emergency department with concerns about not being able to take care of himself.  Patient apparently been canceled doctors appointments and not taking medications.  Has a history of alcohol abuse and is a daily drinker.  Patient states not taking his medications and he ran out of them.  He is not going to doctors appointments because he cannot get a hold of the doctor.  Also reported some shortness of breath.  Denies any fever, chills, nausea, vomiting, chest pain, headache, dizziness, abdominal pain, hematuria.,  Dysuria or constipation or diarrhea.  Vital signs within normal limits and stable.  Although reviewing his vital signs throughout his ER course he did drop to 85% on room air.  He is currently 99% on 3 L nasal cannula.  Laboratory studies demonstrate BUN 22, creatinine 2.4, procalcitonin 0.19, troponin 74 with repeat of 67, proBNP 5566, alk phos 259, hemoglobin 8.3 and a WBC of 12.3.  INR is greater than 10.  CT imaging shows some increased third spacing of fluid.  Chest x-ray shows bibasilar opacities.  Patient was started on ceftriaxone and doxycycline for presumed pneumonia.  He was also noted to have seizure-like activity for which she was given Ativan.  He was also placed on CIWA protocol.  Patient be admitted to the ICU.     Subjective:  Patient is being followed for Failure to thrive in adult [R62.7]  FRANTZ (acute kidney injury) [N17.9]  Supratherapeutic INR [R79.1]  Anticoagulated on Coumadin [Z79.01]  Alcohol withdrawal syndrome without complication (HCC) [F10.930]   Patient seen and examined.   Events reviewed.   Intubated       warfarin  0.5 mg Oral Once    aztreonam   5/25/2025 at 12:05 PM

## 2025-05-25 NOTE — PROGRESS NOTES
Pharmacy Consultation Note  (Antibiotic Dosing and Monitoring)    Initial consult date: 5/23/2025  Consulting physician/provider: Dr. Redding  Drug: Vancomycin  Indication: Fever/Leukocytosis    Age/  Gender Height Weight IBW  Allergy Information   59 y.o./male 177.8 cm (5' 10\") 99.8 kg (220 lb)     Ideal body weight: 73 kg (160 lb 15 oz)  Adjusted ideal body weight: 92.6 kg (204 lb 2.3 oz)   Patient has no known allergies.      Renal Function:  Recent Labs     05/24/25  1420 05/24/25  2205 05/25/25  0406   BUN 37* 36* 37*   CREATININE 2.5* 2.4* 2.5*       Intake/Output Summary (Last 24 hours) at 5/25/2025 0652  Last data filed at 5/25/2025 0600  Gross per 24 hour   Intake 5427.42 ml   Output 3400 ml   Net 2027.42 ml       Vancomycin Monitoring:  Trough:  No results for input(s): \"VANCOTROUGH\" in the last 72 hours.  Random:    Recent Labs     05/24/25  0407 05/25/25  0406   VANCORANDOM 20.5 27.4       Vancomycin Administration Times:  Recent vancomycin administrations                     vancomycin (VANCOCIN) 1,500 mg in sodium chloride 0.9 % 250 mL IVPB (Ivzp4Dau) (mg) 1,500 mg New Bag 05/24/25 1439    vancomycin (VANCOCIN) 2000 mg in 0.9% sodium chloride 500 mL IVPB (mg) 2,000 mg New Bag 05/23/25 1559                  Assessment:  Patient is a 59 y.o. male who has been initiated on vancomycin  Estimated Creatinine Clearance: 42 mL/min (A) (based on SCr of 2.5 mg/dL (H)).    Plan:  No additional vancomycin today  Will check vancomycin random level tomorrow AM   Will continue to monitor renal function   Pharmacy to follow      Merari Van, PharmD, BCPS, BCCCP 5/25/2025 6:52 AM      ZHOU: [phone]  SEY: [phone]  SJW: [phone]

## 2025-05-26 NOTE — PROGRESS NOTES
[facility]  Department of Internal Medicine   Internal Medicine Residency   MICU Progress Note    Patient:  Len Dorsey 59 y.o. male  MRN: [id number]     Date of Service: 5/25/2025    Allergy: Patient has no known allergies.    Subjective     The patient was seen and examined at bedside. He remains to be intubated. He opened his eyes to voice but did not follow commands.    24h change: was getting hypotensive, Bumex discontinued overnight    Objective     VS: BP (!) 90/50   Pulse 81   Temp (!) 100.8 °F (38.2 °C) (Bladder)   Resp 20   Ht 1.778 m (5' 10\")   Wt 122 kg (268 lb 15.4 oz)   SpO2 97%   BMI 38.59 kg/m²           I & O - 24hr:   Intake/Output Summary (Last 24 hours) at 5/25/2025 2154  Last data filed at 5/25/2025 2000  Gross per 24 hour   Intake 4637.96 ml   Output 1975 ml   Net 2662.96 ml       Physical Exam:  General Appearance: intubated, sedated  Neck: no adenopathy, no carotid bruit, supple, symmetrical, trachea midline, and thyroid not enlarged, symmetric, no tenderness/mass/nodules  Lung: rales bilaterally and wheezing on the left lung field  Heart: normal rate with irregular rhythm  Abdomen: distended, firm, no guarding  Extremities:   3+ pitting edema up to the thighs  Musculoskeletal: No joint swelling, no muscle tenderness. ROM normal in all joints of extremities.   Neurologic: Mental status: opens eyes to voice, does not follow commands    Lines     site day    Art line   None    TLC L Fem 5/25/2025   PICC None    Hemoaccess None      Mechanical Ventilation:  Mode: A/C   TV: 450 ml RR: 18  PEEP: 5 cmH2O FiO2: 40    ABG:     Lab Results   Component Value Date/Time    PH 7.355 05/25/2025 04:19 AM    PCO2 46.6 05/25/2025 04:19 AM    PO2 98.6 05/25/2025 04:19 AM    HCO3 25.4 05/25/2025 04:19 AM    BE -0.2 05/25/2025 04:19 AM    THB 8.3 05/25/2025 04:19 AM    O2SAT 97.2 05/25/2025 04:19 AM        Medications     Infusions: (Fluid, Sedation,  to acute HF exacerbation  Elevated troponin likely secondary demand ischemia   Valvular heart disease s/p mechanical AVR and MVR on 9/14/2020 with redo in 2023 due to endocarditis, on warfarin 1 mg QD  Coronary artery disease s/p CABG in 9/2020, LIMA to LAD  HFimpEF, EF 50% (2/19/2025)  EF recovered to 50% (2/19/2025) from 40-45% (5/10/2024)  Acute on chronic cor pulmonale  History of complete heart block s/p dual chamber permanent pacemaker ([employer]) placement in 2020 at   Hypertension  Acute hypoxic respiratory failure likely secondary to pulmonary edema vs PNA  Concern for aspiration pneumonitis vs pneumonia vs HCAP  COPD  Acute kidney injury on chronic kidney disease, baseline 1.9-2.1  HAGMA  Supratherapeutic INR, s/p Vitamin K 10 mg IV - resolved  Normocytic hypochromic anemia likely secondary to iron deficiency anemia  Leukocytosis likely reactive  Alcoholic liver cirrhosis and hepatic steatosis  History of colon polyps  Testicular cancer s/p chemotherapy    Continue lactulose 20 g TID and rifaximin 400 mg TID, titrate to 2-3 bowel movements per day  Continue Keppra 500 mg BID as seizure prophylaxis  Continue high dose thiamine and folic acid  Maintain on seizure precaution  Requiring mechanical ventilation, wean as able  For daily SAT and SBT, follow daily CXR  Continue breathing treatments  Monitor renal function, avoid nephrotoxic agents  Resume Bumex drip per nephrology recommendations  Strict I/Os  S/p vitamin K 10 IV once, monitor PT/INR daily; pharmacy consulted to dose warfarin  Monitor hemoglobin and for signs of bleeding, transfuse as needed  Continue pantoprazole 40 mg BID  Continue aztreonam for episodes of low-grade fever, repeat blood cultures if he remains febrile  Resume home medications as able    Caitlyn Sagastume MD, PGY-1    Attending physician: Dr. Patricia DOMINGUEZ personally saw, examined and provided care for the patient. Radiographs, labs and medication list were reviewed by me

## 2025-05-26 NOTE — PLAN OF CARE
Problem: Skin/Tissue Integrity  Goal: Skin integrity remains intact  Description: 1.  Monitor for areas of redness and/or skin breakdown2.  Assess vascular access sites hourly3.  Every 4-6 hours minimum:  Change oxygen saturation probe site4.  Every 4-6 hours:  If on nasal continuous positive airway pressure, respiratory therapy assess nares and determine need for appliance change or resting period  5/26/2025 0043 by Susanne Barboza RN  Outcome: Not Progressing       Problem: Chronic Conditions and Co-morbidities  Goal: Patient's chronic conditions and co-morbidity symptoms are monitored and maintained or improved  5/26/2025 0043 by Susanne Barboza RN  Outcome: Progressing     Problem: Discharge Planning  Goal: Discharge to home or other facility with appropriate resources  5/26/2025 0043 by Susanne Barboza RN  Outcome: Progressing     Problem: Safety - Adult  Goal: Free from fall injury  5/26/2025 0043 by Susanne Barboza RN  Outcome: Progressing     Problem: Pain  Goal: Verbalizes/displays adequate comfort level or baseline comfort level  5/26/2025 0043 by Susanne Barboza RN  Outcome: Progressing     Problem: Safety - Medical Restraint  Goal: Remains free of injury from restraints (Restraint for Interference with Medical Device)  Description: INTERVENTIONS:1. Determine that other, less restrictive measures have been tried or would not be effective before applying the restraint2. Evaluate the patient's condition at the time of restraint application3. Inform patient/family regarding the reason for restraint4. Q2H: Monitor safety, psychosocial status, comfort, nutrition and hydration  5/26/2025 0043 by Susanne Barboza RN  Outcome: Progressing  Flowsheets  Taken 5/26/2025 0000  Remains free of injury from restraints (restraint for interference with medical device): Every 2 hours: Monitor safety, psychosocial status, comfort, nutrition and hydration  Taken 5/25/2025 2000  Remains free of injury from  restraints (restraint for interference with medical device): Every 2 hours: Monitor safety, psychosocial status, comfort, nutrition and hydration     Problem: Nutrition Deficit:  Goal: Optimize nutritional status  5/26/2025 0043 by Susanne Barboza RN  Outcome: Progressing     Problem: Neurosensory - Adult  Goal: Achieves stable or improved neurological status  5/26/2025 0043 by Susanne Barboza RN  Outcome: Progressing     Problem: Neurosensory - Adult  Goal: Absence of seizures  5/26/2025 0043 by Susanne Barboza RN  Outcome: Progressing     Problem: Respiratory - Adult  Goal: Achieves optimal ventilation and oxygenation  5/26/2025 0043 by Susanne Barboza RN  Outcome: Progressing

## 2025-05-26 NOTE — PROGRESS NOTES
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     Weight:   Wt Readings from Last 1 Encounters:   05/22/25 122 kg (268 lb 15.4 oz)       TSH:    Lab Results   Component Value Date/Time    TSH 2.98 05/20/2025 04:31 AM       Hepatic Function Panel:                            Lab Results   Component Value Date/Time    ALKPHOS 311 05/26/2025 04:18 AM    ALT 10 05/26/2025 04:18 AM    AST 50 05/26/2025 04:18 AM    BILITOT 1.5 05/26/2025 04:18 AM    BILIDIR 1.5 05/23/2025 12:18 PM    IBILI 0.5 05/23/2025 12:18 PM         Recent Labs     05/23/25  1218 05/24/25  0407 05/25/25  0406 05/26/25  0418   HGB 7.7* 7.4* 7.2* 7.1*     --  112*  --      Date Warfarin Dose INR Heparin or LMWH Comment   5/20 1 mg 3.2 -- VitK 10 mg x1 on 5/19  Meropenem started   5/21 1 mg- medication held  2 -- Warfarin held for IR procedure   5/22 1.5 mg 2 --    5/23 1mg  2.4 --    5/24 0.5 mg 2.9 --    5/25 0.5 mg 2.9 --    5/26 1 mg 2.5 --      Assessment:  Patient is a 59 y.o. male on warfarin for Atrial Fibrillation, AVR, and mMVR.  Patient's home warfarin dosing regimen is 1 mg daily - there is some questions surrounding compliance.   Goal INR 2.5 - 3.5  INR 2.5    Plan:  Warfarin 1 mg x 1 tonight  Daily PT/INR until the INR is stable within the therapeutic range  Pharmacist will follow and monitor/adjust dosing as necessary    Thank you for this consult,    Merari Van, PharmD, BCPS, BCCCP 5/26/2025 7:41 AM  [phone]

## 2025-05-26 NOTE — PROGRESS NOTES
Pharmacy Consultation Note  (Antibiotic Dosing and Monitoring)    Note vancomycin discontinued. Clinical pharmacy will sign-off. Please re-consult if we can be of further assistance.    Merari Van, PharmD, BCPS, BCCCP 5/26/2025 7:42 AM      ZHOU: [phone]  SEY: [phone]  SJW: [phone]

## 2025-05-26 NOTE — PROGRESS NOTES
Department of Internal Medicine  Nephrology Attending Consult Note    Events reviewed    SUBJECTIVE: We are following Mr. Dorsey for FRANTZ on CKD.  Presently intubated    PHYSICAL EXAM:      Vitals:    VITALS:  BP (!) 98/50   Pulse 79   Temp 99.9 °F (37.7 °C) (Bladder)   Resp 21   Ht 1.778 m (5' 10\")   Wt 122 kg (268 lb 15.4 oz)   SpO2 99%   BMI 38.59 kg/m²   24HR INTAKE/OUTPUT:    Intake/Output Summary (Last 24 hours) at 5/26/2025 1055  Last data filed at 5/26/2025 0600  Gross per 24 hour   Intake 3400.31 ml   Output 2624 ml   Net 776.31 ml       Constitutional: Patient is presently intubated, sedated  HEENT: Pupils equal reactive, endotracheal tube in place  Respiratory: Decreased breath sounds at the bases  Cardiovascular/Edema: Heart sounds are regular  Gastrointestinal: Abdomen soft  Neurologic: Unable to level  Skin: No lesion  Other: + Edema    DATA:    CBC:   Lab Results   Component Value Date/Time    WBC 9.5 05/26/2025 04:18 AM    RBC 2.75 05/26/2025 04:18 AM    HGB 7.1 05/26/2025 04:18 AM    HCT 24.5 05/26/2025 04:18 AM    MCV 89.1 05/26/2025 04:18 AM    MCH 25.8 05/26/2025 04:18 AM    MCHC 29.0 05/26/2025 04:18 AM    RDW 21.5 05/26/2025 04:18 AM     05/25/2025 04:06 AM    MPV Can not be calculated 05/26/2025 04:18 AM     CMP:    Lab Results   Component Value Date/Time     05/26/2025 04:18 AM    K 3.7 05/26/2025 04:18 AM    K 3.9 09/15/2022 03:08 PM     05/26/2025 04:18 AM    CO2 24 05/26/2025 04:18 AM    BUN 39 05/26/2025 04:18 AM    CREATININE 2.7 05/26/2025 04:18 AM    GFRAA >60 09/15/2022 03:08 PM    LABGLOM 27 05/26/2025 04:18 AM    LABGLOM 31 03/05/2024 05:57 AM    GLUCOSE 114 05/26/2025 04:18 AM    CALCIUM 8.6 05/26/2025 04:18 AM    BILITOT 1.5 05/26/2025 04:18 AM    ALKPHOS 311 05/26/2025 04:18 AM    AST 50 05/26/2025 04:18 AM    ALT 10 05/26/2025 04:18 AM     Magnesium:    Lab Results   Component Value Date/Time    MG 2.0 05/26/2025 04:18 AM     Phosphorus:    Lab  thrive, in the ER CT head showed no acute abnormalities, CT abdomen pelvis showed mild colonic ileus, prominent subcutaneous edema is worsening, moderate ascites he is well was found to have a supratherapeutic INR >10, his ABGs show a pH of 7.244 with PO2 of 57, bicarbonate level of 16.3, while in the ER he developed seizure activity and therefore he was admitted to the MICU.  His initial creatinine level was 2.4 mg/dl, and has increased to 2.9 mg/dL, reason for this consultation.    Problems resolved:    Hyperchloremic metabolic acidosis, probably due to GI losses    IMPRESSION/RECOMMENDATIONS:      FRANTZ on CKD, hemodynamically mediated due to heart failure; renal function stable, renal function somewhat improved, creatinine 2.5 mg/dL, stable with fair urine output.    CKD stage IIIa, probably due to nephrosclerosis, baseline creatinine 1.9-2.1 mg/dL    Hypernatremia, with hypervolemia, needs natriuresis, Bumex drip    HFpEF 50%, proBNP 5566> 7050, with anasarca and ascites, needs natriuresis, Bumex drip  Hypotension, on midodrine  Respiratory alkalosis  Normocytic anemia, with iron deficiency, ferritin 64, iron saturation 10%, folate 10.2, B12 >2000  --------------------------------------  Respiratory failure status post intubation  New onset seizures, on Depakote  Probably pneumonia, on ceftriaxone and doxycycline  Cirrhosis, 2/2 Alcohol abuse, on lactulose, rifaximin  CAD status post CABG, AVR and MVR mechanical valves  HLD, on atorvastatin   History of testicular cancer  History of CVA  Supratherapeutic INR  Nutrition, TF at 75 cc/hour, free water 60 cc/hour     Plan:     Reason Bumex drip at 0.2 mg/hour  Continue free water 150 cc/every 3 hours  Continue midodrine 5 mg 3 times daily  Continue to monitor renal function  Continue monitor sodium level         Omar Joiner MD

## 2025-05-26 NOTE — PROGRESS NOTES
Department of Internal Medicine  Infectious Diseases  Progress Note      C/C:  Fever , leukocytosis , resp failure     All above noted  Pt is intubated, sedated   Low grade temp     Current Facility-Administered Medications   Medication Dose Route Frequency Provider Last Rate Last Admin    warfarin (COUMADIN) tablet 1 mg  1 mg Oral Once Santo Long MD        ipratropium 0.5 mg-albuterol 2.5 mg (DUONEB) nebulizer solution 1 Dose  1 Dose Inhalation Q4H WA RT Caitlyn Sagastume MD        budesonide (PULMICORT) nebulizer suspension 500 mcg  0.5 mg Nebulization BID RT Caitlyn Sagastume MD   500 mcg at 05/26/25 0923    arformoterol tartrate (BROVANA) nebulizer solution 15 mcg  15 mcg Nebulization BID RT Caitlyn Sagastume MD   15 mcg at 05/26/25 0923    albumin human 25% IV solution 25 g  25 g IntraVENous Q8H Sara Matos  mL/hr at 05/26/25 1125 25 g at 05/26/25 1125    bumetanide (BUMEX) tablet 1 mg  1 mg Oral Daily RosieValentín murray MD   1 mg at 05/26/25 0806    aztreonam (AZACTAM) 1,000 mg in sodium chloride 0.9 % 50 mL IVPB  1,000 mg IntraVENous Q12H Mikael Redding MD   Stopped at 05/26/25 0541    chlorhexidine (PERIDEX) 0.12 % solution 15 mL  15 mL Mouth/Throat BID Aurelio Motnoya MD   15 mL at 05/26/25 0806    fentaNYL (SUBLIMAZE) 1,000 mcg in sodium chloride 0.9% 100 mL infusion   mcg/hr IntraVENous Continuous uArelio Montoya MD 10 mL/hr at 05/26/25 0458 100 mcg/hr at 05/26/25 0458    thiamine (B-1) injection 100 mg  100 mg IntraVENous Daily Aurelio Montoya MD   100 mg at 05/26/25 0806    lactulose (CHRONULAC) 10 GM/15ML solution 20 g  20 g Oral TID Jaja Moreno MD   20 g at 05/26/25 0806    white petrolatum ointment   Topical BID PRN Bay Eugene MD        midodrine (PROAMATINE) tablet 5 mg  5 mg Oral TID  Jaja Moreno MD   5 mg at 05/26/25 0806    warfarin placeholder: dosing by pharmacy   Oral RX Placeholder Santo Long MD        atorvastatin (LIPITOR)  tablet 20 mg  20 mg Oral Nightly FlorentinoCarissa APRN - CNP   20 mg at 05/25/25 1942    rifAXIMin (XIFAXAN) tablet 400 mg  400 mg Oral TID FlorentinoCarissa APRN - CNP   400 mg at 05/26/25 0806    busPIRone (BUSPAR) tablet 5 mg  5 mg Oral BID Jaja Moreno MD   5 mg at 05/26/25 0806    sodium chloride flush 0.9 % injection 5-40 mL  5-40 mL IntraVENous 2 times per day Charles Wood DO   10 mL at 05/26/25 0807    sodium chloride flush 0.9 % injection 5-40 mL  5-40 mL IntraVENous PRN Charles Wood DO        0.9 % sodium chloride infusion   IntraVENous PRN Charles Wood DO   Stopped at 05/22/25 2105    multivitamin 1 tablet  1 tablet Oral Daily Charles Wood DO   1 tablet at 05/26/25 0806    folic acid (FOLVITE) tablet 1 mg  1 mg Oral Daily Charles Wood DO   1 mg at 05/26/25 0806    acetaminophen (TYLENOL) tablet 650 mg  650 mg Oral Q6H PRN Carissa Good APRN - CNP   650 mg at 05/25/25 2215    Or    acetaminophen (TYLENOL) suppository 650 mg  650 mg Rectal Q6H PRN Carissa Good APRN - CNP        melatonin tablet 3 mg  3 mg Oral Nightly PRN Carissa Good APRN - CNP        magnesium sulfate 2000 mg in 50 mL IVPB premix  2,000 mg IntraVENous PRN Len Arguello DO        ondansetron (ZOFRAN-ODT) disintegrating tablet 4 mg  4 mg Oral Q8H PRN Len Arguello DO        Or    ondansetron (ZOFRAN) injection 4 mg  4 mg IntraVENous Q6H PRN Len Arguello DO        polyethylene glycol (GLYCOLAX) packet 17 g  17 g Oral Daily PRN Len Arguello DO   17 g at 05/21/25 0853    levETIRAcetam (KEPPRA) injection 500 mg  500 mg IntraVENous Q12H Caitlyn Sagastume MD   500 mg at 05/26/25 1121    pantoprazole (PROTONIX) injection 40 mg  40 mg IntraVENous BID Caitlyn Sagastume MD   40 mg at 05/26/25 0807    glucose chewable tablet 16 g  4 tablet Oral PRN Caitlyn Sagastume MD        dextrose bolus 10% 125 mL  125 mL IntraVENous PRN Caitlyn Sagastume MD   Stopped at

## 2025-05-26 NOTE — PLAN OF CARE
Problem: Chronic Conditions and Co-morbidities  Goal: Patient's chronic conditions and co-morbidity symptoms are monitored and maintained or improved  5/26/2025 0954 by Amador Rose RN  Outcome: Progressing  5/26/2025 0043 by Susanne Barboza RN  Outcome: Progressing     Problem: Discharge Planning  Goal: Discharge to home or other facility with appropriate resources  5/26/2025 0954 by Amador Rose RN  Outcome: Progressing  5/26/2025 0043 by Susanne Barboza RN  Outcome: Progressing     Problem: Safety - Adult  Goal: Free from fall injury  5/26/2025 0954 by Amador Rose RN  Outcome: Progressing  5/26/2025 0043 by Susanne Barboza RN  Outcome: Progressing     Problem: Pain  Goal: Verbalizes/displays adequate comfort level or baseline comfort level  5/26/2025 0954 by Amador Rose RN  Outcome: Progressing  5/26/2025 0043 by Susanne Barboza RN  Outcome: Progressing     Problem: Safety - Medical Restraint  Goal: Remains free of injury from restraints (Restraint for Interference with Medical Device)  Description: INTERVENTIONS:1. Determine that other, less restrictive measures have been tried or would not be effective before applying the restraint2. Evaluate the patient's condition at the time of restraint application3. Inform patient/family regarding the reason for restraint4. Q2H: Monitor safety, psychosocial status, comfort, nutrition and hydration  5/26/2025 0954 by Amador Rose RN  Outcome: Progressing  5/26/2025 0043 by Susanne Barboza RN  Outcome: Progressing  Flowsheets  Taken 5/26/2025 0000  Remains free of injury from restraints (restraint for interference with medical device): Every 2 hours: Monitor safety, psychosocial status, comfort, nutrition and hydration  Taken 5/25/2025 2000  Remains free of injury from restraints (restraint for interference with medical device): Every 2 hours: Monitor safety, psychosocial status, comfort, nutrition and hydration     Problem:  nasal continuous positive airway pressure, respiratory therapy assess nares and determine need for appliance change or resting period  5/26/2025 0954 by Amador Rose, RN  Outcome: Progressing  5/26/2025 0043 by Susanne Barboza RN  Outcome: Not Progressing     Problem: Skin/Tissue Integrity - Adult  Goal: Skin integrity remains intact  Description: 1.  Monitor for areas of redness and/or skin breakdown2.  Assess vascular access sites hourly3.  Every 4-6 hours minimum:  Change oxygen saturation probe site4.  Every 4-6 hours:  If on nasal continuous positive airway pressure, respiratory therapy assess nares and determine need for appliance change or resting period  5/26/2025 0954 by Amador Rose, RN  Outcome: Progressing  5/26/2025 0043 by Susanne Barboza, RN  Outcome: Not Progressing

## 2025-05-26 NOTE — PROGRESS NOTES
[facility] Hospitalist Progress Note    Admitting Date and Time: 5/19/2025 10:40 AM  Admit Dx: Failure to thrive in adult [R62.7]  FRANTZ (acute kidney injury) [N17.9]  Supratherapeutic INR [R79.1]  Anticoagulated on Coumadin [Z79.01]  Alcohol withdrawal syndrome without complication (HCC) [F10.930]    Patient presented to the emergency department with concerns about not being able to take care of himself.  Patient apparently been canceled doctors appointments and not taking medications.  Has a history of alcohol abuse and is a daily drinker.  Patient states not taking his medications and he ran out of them.  He is not going to doctors appointments because he cannot get a hold of the doctor.  Also reported some shortness of breath.  Denies any fever, chills, nausea, vomiting, chest pain, headache, dizziness, abdominal pain, hematuria.,  Dysuria or constipation or diarrhea.  Vital signs within normal limits and stable.  Although reviewing his vital signs throughout his ER course he did drop to 85% on room air.  He is currently 99% on 3 L nasal cannula.  Laboratory studies demonstrate BUN 22, creatinine 2.4, procalcitonin 0.19, troponin 74 with repeat of 67, proBNP 5566, alk phos 259, hemoglobin 8.3 and a WBC of 12.3.  INR is greater than 10.  CT imaging shows some increased third spacing of fluid.  Chest x-ray shows bibasilar opacities.  Patient was started on ceftriaxone and doxycycline for presumed pneumonia.  He was also noted to have seizure-like activity for which she was given Ativan.  He was also placed on CIWA protocol.  Patient be admitted to the ICU.     Subjective:  Patient is being followed for Failure to thrive in adult [R62.7]  FRANTZ (acute kidney injury) [N17.9]  Supratherapeutic INR [R79.1]  Anticoagulated on Coumadin [Z79.01]  Alcohol withdrawal syndrome without complication (HCC) [F10.930]   Patient seen and examined.   Events reviewed.   Intubated, sedated.   Agitated.        warfarin  1 mg  computerized transcription errors may be present.  Electronically signed by Ramila Chase MD on 5/26/2025 at 12:29 PM

## 2025-05-27 ENCOUNTER — HOSPITAL ENCOUNTER (OUTPATIENT)
Dept: OTHER | Age: 60
Setting detail: THERAPIES SERIES
Discharge: HOME | End: 2025-05-27

## 2025-05-27 NOTE — PROGRESS NOTES
Department of Internal Medicine  Infectious Diseases  Progress Note      C/C:  Fever , leukocytosis , resp failure     All above noted  Pt is intubated, sedated   Low grade temp     Current Facility-Administered Medications   Medication Dose Route Frequency Provider Last Rate Last Admin    0.9 % sodium chloride infusion   IntraVENous PRN Valentín Velez MD        ipratropium 0.5 mg-albuterol 2.5 mg (DUONEB) nebulizer solution 1 Dose  1 Dose Inhalation Q4H WA RT Caitlyn Sagastume MD   1 Dose at 05/27/25 0855    budesonide (PULMICORT) nebulizer suspension 500 mcg  0.5 mg Nebulization BID RT Caitlyn Sagastume MD   500 mcg at 05/27/25 0855    arformoterol tartrate (BROVANA) nebulizer solution 15 mcg  15 mcg Nebulization BID RT Caitlyn Sagastume MD   15 mcg at 05/27/25 0855    albumin human 25% IV solution 25 g  25 g IntraVENous Q8H Sara Matos  mL/hr at 05/27/25 1059 25 g at 05/27/25 1059    bumetanide (BUMEX) 12.5 mg in sodium chloride 0.9 % 125 mL infusion  0.2 mg/hr IntraVENous Continuous Caitlyn Sagastume MD 2 mL/hr at 05/27/25 0557 0.2 mg/hr at 05/27/25 0557    [Held by provider] bumetanide (BUMEX) tablet 1 mg  1 mg Oral Daily Valentín Velez MD   1 mg at 05/26/25 0806    chlorhexidine (PERIDEX) 0.12 % solution 15 mL  15 mL Mouth/Throat BID Aurelio Montoya MD   15 mL at 05/27/25 0819    fentaNYL (SUBLIMAZE) 1,000 mcg in sodium chloride 0.9% 100 mL infusion   mcg/hr IntraVENous Continuous Aurelio Montoya MD 12.5 mL/hr at 05/27/25 0557 125 mcg/hr at 05/27/25 0557    thiamine (B-1) injection 100 mg  100 mg IntraVENous Daily Aurelio Montoya MD   100 mg at 05/27/25 0818    lactulose (CHRONULAC) 10 GM/15ML solution 20 g  20 g Oral TID Jaja Moreno MD   20 g at 05/27/25 0818    white petrolatum ointment   Topical BID PRN Bay Eugene MD        midodrine (PROAMATINE) tablet 5 mg  5 mg Oral TID  Jaja Moreno MD   5 mg at 05/27/25 0818    warfarin placeholder:

## 2025-05-27 NOTE — PROGRESS NOTES
DAILY VENTILATOR WEANING ASSESSMENT PERFORMED    P/FIO2 Ratio = 3.15         (<100= do not Wean)                  Cs =  23                        (<32= Instability)  Plat. Pressure = 24    Was SAT completed yes,     If yes proceed with the Spontaneous Breathing Trial (SBT) as long as none of the following exclusion criteria are met:     [] P/FIO2 <100  [] FiO2 >50%  [] Peep >10 CMH2O  [] Patient on vasopressor  [] Failed SAT  [] SpO2 < 88%  [] Active MI  [] Lack of inspiratory effort     If any exclusion criteria are meet Do Not Proceed to SBT.    Was SBT completed yes    Ask Physician for a weaning plan yes    Instabilities:    Cardiovascular     CNS      [] Mean BP less than or equal to 75   [] Neuromuscular blockade  [] Heart Rate greater than 130   [] RASS of -3, -4, -5  [] Mechanical Assist Device    []  RASS of +3, +4         [] ICP > 15 or Intracranial Hypertension         Respiratory      Metabolic   [] Temp. (8hrs) < 95 or > 103  [] Respiratory Rate greater than 35   [] WBC < 5000 or > 34594  [] Minute Volume greater than 15L  [] pH less than 7.30  [] Deteriorating chest X-ray         Parameters    no    NIF=  VC=  MV =  RSBI =      Additional Comments:   Patient failed SBT, Patient had increase RR and decreased tidal volumes. RN aware.     Performed by Lin Zhao RCP RRT

## 2025-05-27 NOTE — H&P
A closure device system was utilized/deployed for pre-closure of the left femoral vein access site using a DEVICE [facility] PROSTYLE [facility] DISP - [id number]. 34.44 kg/m²     General Appearance: alert and oriented to person, place and time and in no acute distress  Skin: warm and dry  Head: normocephalic and atraumatic  Eyes: pupils equal, round, and reactive to light, extraocular eye movements intact, conjunctivae normal  Neck: neck supple and non tender without mass   Pulmonary/Chest: Diminished to auscultation bilaterally- no wheezes, rales or rhonchi, normal air movement, no respiratory distress  Cardiovascular: Tachycardic rate, normal S1 and S2 and no RGM  Abdomen: soft, non-tender, non-distended, normal bowel sounds  Extremities: no cyanosis, no clubbing and no edema  Neurologic: no cranial nerve deficit and speech normal    LABS:  Recent Labs     05/07/24  0924      K 3.8      CO2 22   BUN 16   CREATININE 1.4*   GLUCOSE 88   CALCIUM 8.2*       Recent Labs     05/07/24  0924   WBC 4.7   RBC 3.69*   HGB 9.4*   HCT 31.7*   MCV 85.9   MCH 25.5*   MCHC 29.7*   RDW 17.7*   *   MPV 11.5       No results for input(s): \"POCGLU\" in the last 72 hours.        Radiology: CT HEAD WO CONTRAST    Result Date: 5/7/2024  EXAMINATION: CT OF THE HEAD WITHOUT CONTRAST  5/7/2024 3:32 pm TECHNIQUE: CT of the head was performed without the administration of intravenous contrast. Automated exposure control, iterative reconstruction, and/or weight based adjustment of the mA/kV was utilized to reduce the radiation dose to as low as reasonably achievable. COMPARISON: None. HISTORY: ORDERING SYSTEM PROVIDED HISTORY: headache; elevated INR TECHNOLOGIST PROVIDED HISTORY: Reason for exam:->headache; elevated INR Has a \"code stroke\" or \"stroke alert\" been called?->No Decision Support Exception - unselect if not a suspected or confirmed emergency medical condition->Emergency Medical Condition (MA) FINDINGS: BRAIN/VENTRICLES: There is no acute intracranial hemorrhage, mass effect or midline shift.  No abnormal extra-axial fluid collection.  The gray-white differentiation is

## 2025-05-27 NOTE — PROGRESS NOTES
[facility]  Department of Internal Medicine   Internal Medicine Residency   MICU Progress Note    Patient:  Len Dorsey 59 y.o. male  MRN: [id number]     Date of Service: 5/27/2025    Allergy: Patient has no known allergies.    Subjective     The patient was seen and examined at bedside. He remains to be intubated but was opening his eyes to voice and following commands.    24h change:   Bumex resumed  Hemoglobin dropped to 6.7 - for transfusion  Positive 1,3 Beta-D-Glucan    Objective     VS: BP (!) 105/52   Pulse 78   Temp 99.9 °F (37.7 °C)   Resp 21   Ht 1.778 m (5' 10\")   Wt 122 kg (268 lb 15.4 oz)   SpO2 99%   BMI 38.59 kg/m²           I & O - 24hr:   Intake/Output Summary (Last 24 hours) at 5/27/2025 0847  Last data filed at 5/27/2025 0600  Gross per 24 hour   Intake 2391.37 ml   Output 934 ml   Net 1457.37 ml       Physical Exam:  General Appearance: intubated, sedated  Neck: no adenopathy, no carotid bruit, supple, symmetrical, trachea midline, and thyroid not enlarged, symmetric, no tenderness/mass/nodules  Lung: rales bilaterally, diminished breath sounds on the left  Heart: normal rate with irregular rhythm  Abdomen: distended, firm, no guarding  Extremities:   3+ pitting edema up to the thighs  Musculoskeletal: No joint swelling, no muscle tenderness. ROM normal in all joints of extremities.   Neurologic: Mental status: opens eyes to voice, does not follow commands    Lines     site day    Art line   None    TLC L Fem 5/25/2025   PICC None    Hemoaccess None      Mechanical Ventilation:  Mode: A/C   TV: 450 ml RR: 18  PEEP: 5 cmH2O FiO2: 40    ABG:     Lab Results   Component Value Date/Time    PH 7.447 05/27/2025 03:55 AM    PCO2 36.9 05/27/2025 03:55 AM    PO2 126.0 05/27/2025 03:55 AM    HCO3 24.9 05/27/2025 03:55 AM    BE 0.9 05/27/2025 03:55 AM    THB 7.8 05/27/2025 03:55 AM    O2SAT 98.8 05/27/2025 03:55 AM        Medications     Infusions: (Fluid, Sedation,  Beta-D-Glucan  Anasarca likely secondary to liver cirrhosis  Elevated pro-BNP in the setting of liver cirrhosis  Elevated troponin likely secondary demand ischemia   VHD s/p mechanical AVR and MVR on 9/14/2020 with redo in 2023 due to endocarditis, on warfarin 1 mg QD  CAD s/p CABG in 9/2020, LIMA to LAD  HFimpEF, EF 50% (2/19/2025)  EF recovered to 50% (2/19/2025) from 40-45% (5/10/2024)  Acute on chronic cor pulmonale  History of complete heart block s/p dual chamber permanent pacemaker ([employer]) placement in 2020 at   HTN  Acute hypoxic respiratory failure likely secondary to pulmonary edema vs PNA  Concern for aspiration pneumonitis/pneumonia vs HCAP  COPD  FRANTZ on CKD, baseline creatinine 1.9-2.1  Supratherapeutic INR, s/p Vitamin K 10 mg IV - resolved  Normocytic hypochromic anemia likely secondary to iron deficiency anemia  Alcoholic liver cirrhosis and hepatic steatosis  History of colon polyps  Testicular cancer s/p chemotherapy    Continue lactulose 20 g TID and rifaximin 400 mg TID, titrate to 2-3 bowel movements per day  Continue high dose thiamine and folic acid  Continue Keppra 500 mg BID as seizure prophylaxis  Maintain on seizure precaution  Requiring mechanical ventilation, wean as able  For daily SAT and SBT, follow daily CXR and ABG  Continue breathing treatments  Start Levophed for pressure support, titrate to a MAP of >65, wean as tolerated  Perform NICOM and assess for fluid responsiveness  Continue midodrine 5 mg TID  Started on Micafungin by ID  Still having febrile episodes; follow repeat cultures  Hold Bumex drip for now due to hypotension  Monitor renal function, avoid nephrotoxic agents; strict I/Os  S/p vitamin K 10 IV once, monitor PT/INR daily; pharmacy consulted to dose warfarin  Monitor hemoglobin and for signs of bleeding, transfuse as needed  Continue pantoprazole 40 mg BID    Caitlyn Sagastume MD, PGY-1    Attending physician: Dr. Patricia DOMINGUEZ personally saw, examined and

## 2025-05-27 NOTE — PLAN OF CARE
Problem: Chronic Conditions and Co-morbidities  Goal: Patient's chronic conditions and co-morbidity symptoms are monitored and maintained or improved  5/26/2025 2245 by Bryan Saucedo RN  Outcome: Not Progressing  5/26/2025 0954 by Amador Rose RN  Outcome: Progressing     Problem: Discharge Planning  Goal: Discharge to home or other facility with appropriate resources  5/26/2025 2245 by Bryan Saucedo RN  Outcome: Not Progressing  5/26/2025 0954 by Amador Rose RN  Outcome: Progressing     Problem: Safety - Adult  Goal: Free from fall injury  5/26/2025 2245 by Bryan Saucedo RN  Outcome: Not Progressing  5/26/2025 0954 by Amador Rose RN  Outcome: Progressing     Problem: Pain  Goal: Verbalizes/displays adequate comfort level or baseline comfort level  5/26/2025 2245 by Bryan Saucedo RN  Outcome: Not Progressing  5/26/2025 0954 by Amador Rose RN  Outcome: Progressing     Problem: Safety - Medical Restraint  Goal: Remains free of injury from restraints (Restraint for Interference with Medical Device)  5/26/2025 2245 by Bryan Saucedo RN  Outcome: Not Progressing  5/26/2025 0954 by Amador Rose RN  Outcome: Progressing     Problem: Skin/Tissue Integrity  Goal: Skin integrity remains intact  5/26/2025 2245 by Bryan Saucedo RN  Outcome: Not Progressing  5/26/2025 0954 by Amador Rose RN  Outcome: Progressing     Problem: Nutrition Deficit:  Goal: Optimize nutritional status  5/26/2025 2245 by Bryan Saucedo RN  Outcome: Not Progressing  5/26/2025 0954 by Amador Rose RN  Outcome: Progressing     Problem: Neurosensory - Adult  Goal: Achieves stable or improved neurological status  5/26/2025 2245 by Bryan Saucedo RN  Outcome: Not Progressing  5/26/2025 0954 by Amador Rose RN  Outcome: Progressing  Goal: Absence of seizures  5/26/2025 2245 by Bryan Saucedo, RN  Outcome: Not Progressing  5/26/2025 0954 by

## 2025-05-27 NOTE — PROGRESS NOTES
Department of Internal Medicine  Nephrology Attending Consult Note    Events reviewed    SUBJECTIVE: We are following Mr. Dorsey for FRANTZ on CKD.  Presently intubated    PHYSICAL EXAM:      Vitals:    VITALS:  BP (!) 105/52   Pulse 85   Temp 99.9 °F (37.7 °C)   Resp 21   Ht 1.778 m (5' 10\")   Wt 122 kg (268 lb 15.4 oz)   SpO2 99%   BMI 38.59 kg/m²   24HR INTAKE/OUTPUT:    Intake/Output Summary (Last 24 hours) at 5/27/2025 0931  Last data filed at 5/27/2025 0600  Gross per 24 hour   Intake 2391.37 ml   Output 884 ml   Net 1507.37 ml       Constitutional: Patient is presently intubated, sedated  HEENT: Pupils equal reactive, endotracheal tube in place  Respiratory: Decreased breath sounds at the bases  Cardiovascular/Edema: Heart sounds are regular  Gastrointestinal: Abdomen soft  Neurologic: Unable to level  Skin: No lesion  Other: + Edema    DATA:    CBC:   Lab Results   Component Value Date/Time    WBC 8.0 05/27/2025 04:19 AM    RBC 2.56 05/27/2025 04:19 AM    HGB 6.7 05/27/2025 04:19 AM    HCT 22.7 05/27/2025 04:19 AM    MCV 88.7 05/27/2025 04:19 AM    MCH 26.2 05/27/2025 04:19 AM    MCHC 29.5 05/27/2025 04:19 AM    RDW 22.4 05/27/2025 04:19 AM     05/25/2025 04:06 AM    MPV Can not be calculated 05/27/2025 04:19 AM     CMP:    Lab Results   Component Value Date/Time     05/27/2025 07:42 AM    K 4.3 05/27/2025 07:42 AM    K 3.9 09/15/2022 03:08 PM     05/27/2025 07:42 AM    CO2 26 05/27/2025 07:42 AM    BUN 44 05/27/2025 07:42 AM    CREATININE 2.6 05/27/2025 07:42 AM    GFRAA >60 09/15/2022 03:08 PM    LABGLOM 27 05/27/2025 07:42 AM    LABGLOM 31 03/05/2024 05:57 AM    GLUCOSE 112 05/27/2025 07:42 AM    CALCIUM 9.0 05/27/2025 07:42 AM    BILITOT 1.5 05/26/2025 04:18 AM    ALKPHOS 311 05/26/2025 04:18 AM    AST 50 05/26/2025 04:18 AM    ALT 10 05/26/2025 04:18 AM     Magnesium:    Lab Results   Component Value Date/Time    MG 2.0 05/27/2025 04:19 AM     Phosphorus:    Lab Results    Component Value Date/Time    PHOS 4.0 05/27/2025 04:19 AM     Radiology Review:        FINDINGS:  Head: No acute intracranial hemorrhage, mass effect or hydrocephalus.. Redemonstrated old infarcts of right more than left cerebrum and cerebellum  and chronic white matter small vessel ischemic changes.     Abdominopelvic: Prominent subcutaneous edema is worse compared to prior. Redemonstrated left nephrolithiasis and cholelithiasis.  The renal lesions are less well demonstrated by noncontrast exam.  Some areas increased  attenuation probably hyperdense cysts but nonspecific consider evaluation on pre and post-contrast MRI or CT for enhancement evaluation as with neoplasm.  No intestinal obstruction.  Mild distension such as at the transverse colon suspicious for ileus.  Small pleural effusions.  Alveolar densities at least in the right base suggesting edema although nonspecific, with cardiomegaly  and postop changes.  Moderate ascites.     IMPRESSION:  1. Abdominopelvic: Increased 3rd spacing of fluid.  Mild colonic ileus. Incidental findings are described above.  2. Head: No acute intracranial abnormality.  Chronic ischemic changes redemonstrated.     Chest x-ray 5/19/2025    IMPRESSION:  1. Cardiomegaly with pulmonary vascular congestion.  2. Pleural based opacity in the right mid lower lung.  3. Subtle bibasilar opacity.             BRIEF SUMMARY OF INITIAL CONSULT:    Briefly Mr. Dorsey is a 59-year-old man with history of CKD stage IIIa, with large proteinuria, probably due to nephrosclerosis, baseline creatinine 1.9-2.1 mg/dL, HTN, CAD status post CABG, HFpEF 55%, valvular heart disease status post AVR and MVR both mechanical valves, post pacemaker placement, hyperlipidemia, CVA, testicular cancer status postchemotherapy, alcohol abuse, cirrhosis, who was admitted on who was admitted on 5/19/2025 after he presented to the ER with complaints of not being able to take care of himself and failure to thrive,  in the ER CT head showed no acute abnormalities, CT abdomen pelvis showed mild colonic ileus, prominent subcutaneous edema is worsening, moderate ascites he is well was found to have a supratherapeutic INR >10, his ABGs show a pH of 7.244 with PO2 of 57, bicarbonate level of 16.3, while in the ER he developed seizure activity and therefore he was admitted to the MICU.  His initial creatinine level was 2.4 mg/dl, and has increased to 2.9 mg/dL, reason for this consultation.    Problems resolved:    Hyperchloremic metabolic acidosis, probably due to GI losses    IMPRESSION/RECOMMENDATIONS:      FRANTZ on CKD, hemodynamically mediated due to heart failure; renal function stable, renal function is stable, very poor response to Bumex drip    CKD stage IIIa, probably due to nephrosclerosis, baseline creatinine 1.9-2.1 mg/dL    Hypernatremia, with hypervolemia, needs natriuresis, sodium levels  stable    HFpEF 50%, proBNP 5566> 7050, with anasarca and ascites, needs natriuresis, Bumex drip  Hypotension, on midodrine  Respiratory alkalosis  Normocytic anemia, with iron deficiency, ferritin 64, iron saturation 10%, folate 10.2, B12 >2000  --------------------------------------  Respiratory failure status post intubation  New onset seizures, on Depakote  Probably pneumonia, on ceftriaxone and doxycycline  Cirrhosis, 2/2 Alcohol abuse, on lactulose, rifaximin  CAD status post CABG, AVR and MVR mechanical valves  HLD, on atorvastatin   History of testicular cancer  History of CVA  Supratherapeutic INR  Nutrition, TF at 75 cc/hour, free water 60 cc/hour     Plan:     Continue Bumex drip at 0.2 mg/hour  Continue free water 150 cc/every 3 hours  Continue midodrine 5 mg 3 times daily  Continue to monitor renal function  Continue monitor sodium level       Omar Joiner MD

## 2025-05-27 NOTE — PROGRESS NOTES
[facility] Hospitalist Progress Note    Admitting Date and Time: 5/19/2025 10:40 AM  Admit Dx: Failure to thrive in adult [R62.7]  FRANTZ (acute kidney injury) [N17.9]  Supratherapeutic INR [R79.1]  Anticoagulated on Coumadin [Z79.01]  Alcohol withdrawal syndrome without complication (HCC) [F10.930]    Patient presented to the emergency department with concerns about not being able to take care of himself.  Patient apparently been canceled doctors appointments and not taking medications.  Has a history of alcohol abuse and is a daily drinker.  Patient states not taking his medications and he ran out of them.  He is not going to doctors appointments because he cannot get a hold of the doctor.  Also reported some shortness of breath.  Denies any fever, chills, nausea, vomiting, chest pain, headache, dizziness, abdominal pain, hematuria.,  Dysuria or constipation or diarrhea.  Vital signs within normal limits and stable.  Although reviewing his vital signs throughout his ER course he did drop to 85% on room air.  He is currently 99% on 3 L nasal cannula.  Laboratory studies demonstrate BUN 22, creatinine 2.4, procalcitonin 0.19, troponin 74 with repeat of 67, proBNP 5566, alk phos 259, hemoglobin 8.3 and a WBC of 12.3.  INR is greater than 10.  CT imaging shows some increased third spacing of fluid.  Chest x-ray shows bibasilar opacities.  Patient was started on ceftriaxone and doxycycline for presumed pneumonia.  He was also noted to have seizure-like activity for which she was given Ativan.  He was also placed on CIWA protocol.  Patient be admitted to the ICU.     Subjective:  Patient is being followed for Failure to thrive in adult [R62.7]  FRANTZ (acute kidney injury) [N17.9]  Supratherapeutic INR [R79.1]  Anticoagulated on Coumadin [Z79.01]  Alcohol withdrawal syndrome without complication (HCC) [F10.930]   Patient seen and examined.   Events reviewed.   Intubated awake and intermittently following  cholelithiasis, no signs of cholecystitis, steatosis.  Continue Lactulose and Rifaximin.     Palliative care consult for goals of care    Code Status: Full Code  DVT/GI Prophylaxis: Warfarin     Dispo: Pending clinical course.      NOTE: This report was transcribed using voice recognition software. Every effort was made to ensure accuracy; however, inadvertent computerized transcription errors may be present.  Electronically signed by Elsi Weber MD on 5/27/2025 at 1:48 PM

## 2025-05-27 NOTE — FLOWSHEET NOTE
Patient attempting to pull at lines/tubes when unrestrained despite verbal redirection.       05/27/25 0758   Restraint Order   Length of Order (Only Document With Each New Order) Continuous Until 2359 of Next Calendar Day   Attending Physician Notified (Only Document With Each New Order) Yes   Order Upon Application (Only Document With Each New Order) Yes   NV Length of Order 40.03   Restraint Type   Non-Violent Restraint Type from Order question Soft Restraint Right Wrist   Assessment   Less Restrictive Alternative 1:1 patient care;Repositioning;Pain management   Special Consideration/Risk Factors None   Justification from Order   Clinical Justification Involuntary movements to cause harm   Education   Discontinuation Criteria Absence of behavior that required restraint   Criteria Explained Yes   Patient's Response No evidence of learning   Family Notification Already completed   Restraint  Monitoring Q2 Hours   Continued Justification  Continues to meet order criteria   Visual/Safety Check  Subdued   Circulation No signs of injury   Range of Motion Performed   Fluids NPO   Food/Meal Meal/Enteral feeding   Elimination Yes

## 2025-05-27 NOTE — PROGRESS NOTES
Attempted to place patient on PS. Patient had tidal volumes between 80 -170. Increased RR in the high 30s and 40s. Placed back to AC at this time. RN aware.

## 2025-05-27 NOTE — PROGRESS NOTES
Palliative Care Department  [phone]  Palliative Care Progress Note  Provider Megan Child, APRN - CNP     Len Dorsey  [id number]  Hospital Day: 9  Date of Initial Consult: 5/23/25  Referring Provider: Ramila Chase MD   Palliative Medicine was consulted for assistance with: Goals of Care    HPI:   Len Dorsey is a 59 y.o. with a medical history of alcoholic liver disease, aortic valve replacement, mitral valve replacement, HFrEF 40-45%, CVA, testicular ca s/p chemotherapy, CAD s/p CABG  who was admitted on 5/19/2025 from home with a CHIEF COMPLAINT of altered mental status.  Patient's girlfriend/POA called EMS for patient being confused and \"dazed\".  He has a history of alcoholism, but his last drink was February 17, 2025.  In ER, found to be hypoxic 85%, supratherapeutic INR greater than 10, and had witnessed seizure activity described as generalized jerking with eyes deviated lasting 45 seconds.  CT head negative for acute findings, he was admitted to ICU for closer monitoring.  Neurology and nephrology consulted.  He was stabilized and transferred out of ICU on 5/22, 5/23 returns to ICU for worsening respiratory status and lethargy.  Palliative medicine consulted for further assistance with goals of care.    ASSESSMENT/PLAN:     Pertinent Hospital Diagnoses     Altered mental status  Acute respiratory failure  Supratherapeutic INR  Acute renal failure  Alcoholic liver cirrhosis  CHF      Palliative Care Encounter / Counseling Regarding Goals of Care  Please see detailed goals of care discussion as below  At this time, Len Dorsey, Does Not have capacity for medical decision-making.  Capacity is time limited and situation/question specific  During encounter Leonie was surrogate medical decision-maker  Outcome of goals of care meeting:   Continue full code  Continue current medical management  Code status Full Code  Advanced Directives: POA or living will in epic  Surrogate/Legal

## 2025-05-27 NOTE — PROGRESS NOTES
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     Weight:   Wt Readings from Last 1 Encounters:   05/22/25 122 kg (268 lb 15.4 oz)       TSH:    Lab Results   Component Value Date/Time    TSH 2.98 05/20/2025 04:31 AM       Hepatic Function Panel:                            Lab Results   Component Value Date/Time    ALKPHOS 311 05/26/2025 04:18 AM    ALT 10 05/26/2025 04:18 AM    AST 50 05/26/2025 04:18 AM    BILITOT 1.5 05/26/2025 04:18 AM    BILIDIR 1.5 05/23/2025 12:18 PM    IBILI 0.5 05/23/2025 12:18 PM         Recent Labs     05/25/25  0406 05/26/25  0418 05/27/25  0419 05/27/25  1200   HGB 7.2* 7.1* 6.7* 7.3*   *  --   --   --      Date Warfarin Dose INR Heparin or LMWH Comment   5/20 1 mg 3.2 -- VitK 10 mg x1 on 5/19  Meropenem started   5/21 1 mg- medication held  2 -- Warfarin held for IR procedure   5/22 1.5 mg 2 --    5/23 1mg  2.4 --    5/24 0.5 mg 2.9 --    5/25 0.5 mg 2.9 --    5/26 1 mg 2.5 --    5/27 1.5 mg 2.1 --      Assessment:  Patient is a 59 y.o. male on warfarin for Atrial Fibrillation, AVR, and mMVR.  Patient's home warfarin dosing regimen is 1 mg daily - there is some questions surrounding compliance.   Goal INR 2.5 - 3.5  INR 2.1    Plan:  Warfarin 1.5  mg x 1 tonight  Daily PT/INR until the INR is stable within the therapeutic range  Pharmacist will follow and monitor/adjust dosing as necessary    Thank you for this consult,    Latrell Vidal, PharmD, BCPS, BCCCP 5/27/2025 2:30 PM  Phone: [phone]

## 2025-05-27 NOTE — PROGRESS NOTES
After sedation turned off, patient was tried back on PSV patient RR increased and still having low tidal volumes.

## 2025-05-27 NOTE — PROCEDURES
Arterial line placement    Procedure: Right radial arterial line placement.     Indications: Continuous monitoring of blood pressure in a patient with hypotension +/- shock, on Levophed.     Anesthesia: Local infiltration of 1% lidocaine.     Consent:  The partner (primary decision maker) was counseled regarding the procedure, its indications, risks, potential complications and alternatives, and any questions were answered. Consent was obtained to proceed.    Technique: Time Out: Immediately prior to the procedure a \"timeout\" was called to verify the correct patient and procedure. Procedure was done using strict aseptic technique. Teddy's test was performed and was normal. right radial site was cleaned with chloraprep and draped.  Radial artery was identified, then Lidocaine 1% was infiltrated locally.  Radial arterial line was inserted, a good blood flow was obtained, after which guidewire was inserted all the way with no resistance. Then the canula was inserted and needle with guidewire was withdrawn. Pulsatile bright red blood flow was observed. The canula was connected to BP monitoring apparatus and a good quality waveform was noted. Then the canula was secured with 2 stay sutures of 3-0 silk after Lidocaine infiltration, following which dressing was applied.     Number of sticks: 3.     Number of Kits used: 1.     Complications: No immediate complication.      Estimated blood loss: About 1 ml.     Comment: Patient tolerated the procedure well.     Caitlyn Sagastume MD PGY-1  5/27/2025 4:41 PM

## 2025-05-28 NOTE — PROCEDURES
Central Line Insertion     Procedure: Right internal jugular vein Triple Lumen Catheter placement.    Indications: vascular access    Consent: The partner was counseled regarding the procedure, its indications, risks, potential complications and alternatives, and any questions were answered. Consent was obtained to proceed.    Number of sticks: 1    Number of Kits used: 1    Procedure: Time Out: Immediately prior to the procedure a \"timeout\" was called to verify the correct patient and procedure. The patient was place in the trendelenburg position and the skin over the right internal jugular vein was prepped with betadine and draped in a sterile fashion. Local anesthesia was obtained by infiltration using 1% Lidocaine without epinephrine.  With Ultrasound guidance a large bore needle was used to identify the vein, dark non pulsatile blood returned. The guide wire was then inserted through the needle with minimal resistance. 2 mm nick was made in the skin beside the guidewire. Then a dilator was inserted and removed. A triple lumen catheter was then inserted into the vessel over the guide wire using the Seldinger technique to the  15 cm rehana.  All ports showed good, free flowing blood return and were flushed with saline solution.  The catheter was then securely fastened to the skin with sutures and covered with a bio patch and sterile dressing.  A post procedure X-ray was ordered and showed good line position.    Complications: None   The patient tolerated the procedure well.    Estimated blood loss: 5 ml.    Caitlyn Sagastume MD PGY-1  5/28/2025  12:56 PM      Attending:  Dr. Gomez

## 2025-05-28 NOTE — PROGRESS NOTES
05/28/25 1230   Patient Observation   Pulse 67   SpO2 98 %   Vent Information   Equipment Changed Expiratory Filter   Vent Mode AC/VC   Ventilator Settings   Vt (Set, mL) 450 mL   Resp Rate (Set) 18 bpm   PEEP/CPAP (cmH2O) 5   FiO2  40 %   Peak Inspiratory Flow (Set) 60 L/sec   Vent Patient Data (Readings)   Vt (Measured) 464 mL   Peak Inspiratory Pressure (cmH2O) 33 cmH2O   Rate Measured 24 br/min   Minute Volume (L/min) 11 Liters   Peak Inspiratory Flow (lpm) 60 L/sec   Mean Airway Pressure (cmH2O) 15 cmH20   Plateau Pressure (cm H2O) 28 cm H2O   Driving Pressure 23   I:E Ratio 1:2.00   Flow Sensitivity 3 L/min   Backup Apnea On   Backup Rate 18 Breaths Per Minute   Backup Vt 450   Vent Alarm Settings   High Pressure (cmH2O) 45 cmH2O   Low Minute Volume (lpm) 4 L/min   High Minute Volume (lpm) 15 L/min   Low Exhaled Vt (ml) 350 mL   High Exhaled Vt (ml) 800 mL   RR High (bpm) 35 br/min   Apnea (secs) 20 secs   Additional Respiratoray Assessments   Humidification Source Heated wire   Humidification Temp 37   Circuit Condensation Drained   Ambu Bag With Mask At Bedside Yes   ETT    Placement Date/Time: 05/23/25 1513   Placement Verified By: Capnometry;Chest X-ray  Preoxygenation: Yes  Mask Ventilation: Ventilated by mask (1)  Airway Type: Cuffed  Airway Tube Size: 8 mm  Location: Oral  Insertion attempts: 1  Secured At: 25 cm  M...   Secured At 25 cm   Measured From Lips   Secured By Commercial tube hinton   Site Assessment Dry

## 2025-05-28 NOTE — PROGRESS NOTES
The  visited the patient unable to assess at this time. There was no family present during the visit. The  provided sustaining ministry presence and prayer. The  will continue to follow as needed. If additional support is needed please reach out to [facility] (ext [phone]).    Rev MICHELE Pena MDIV,

## 2025-05-28 NOTE — PLAN OF CARE
Problem: Chronic Conditions and Co-morbidities  Goal: Patient's chronic conditions and co-morbidity symptoms are monitored and maintained or improved  Outcome: Not Progressing     Problem: Discharge Planning  Goal: Discharge to home or other facility with appropriate resources  Outcome: Not Progressing     Problem: Safety - Adult  Goal: Free from fall injury  Outcome: Not Progressing     Problem: Pain  Goal: Verbalizes/displays adequate comfort level or baseline comfort level  Outcome: Not Progressing     Problem: Safety - Medical Restraint  Goal: Remains free of injury from restraints (Restraint for Interference with Medical Device)  Outcome: Not Progressing     Problem: Skin/Tissue Integrity  Goal: Skin integrity remains intact  Outcome: Not Progressing     Problem: Nutrition Deficit:  Goal: Optimize nutritional status  Outcome: Not Progressing     Problem: Neurosensory - Adult  Goal: Achieves stable or improved neurological status  Outcome: Not Progressing  Goal: Absence of seizures  Outcome: Not Progressing     Problem: Respiratory - Adult  Goal: Achieves optimal ventilation and oxygenation  5/28/2025 0506 by Bryan Saucedo RN  Outcome: Not Progressing  5/27/2025 1624 by Lin Zhao RCP  Outcome: Progressing     Problem: Skin/Tissue Integrity - Adult  Goal: Skin integrity remains intact  Outcome: Not Progressing     Problem: Gastrointestinal - Adult  Goal: Minimal or absence of nausea and vomiting  Outcome: Not Progressing  Goal: Maintains adequate nutritional intake  Outcome: Not Progressing     Problem: Genitourinary - Adult  Goal: Urinary catheter remains patent  Outcome: Not Progressing     Problem: Infection - Adult  Goal: Absence of infection during hospitalization  Outcome: Not Progressing     Problem: Metabolic/Fluid and Electrolytes - Adult  Goal: Electrolytes maintained within normal limits  Outcome: Not Progressing  Goal: Hemodynamic stability and optimal renal function  maintained  Outcome: Not Progressing     Problem: Confusion  Goal: Confusion, delirium, dementia, or psychosis is improved or at baseline  Outcome: Not Progressing     Problem: ABCDS Injury Assessment  Goal: Absence of physical injury  Outcome: Not Progressing

## 2025-05-28 NOTE — PROGRESS NOTES
Department of Internal Medicine  Nephrology Attending Consult Note    Events reviewed    SUBJECTIVE: We are following Mr. Dorsey for FRANTZ on CKD.  Presently intubated    PHYSICAL EXAM:      Vitals:    VITALS:  BP (!) 117/55   Pulse 80   Temp 99.5 °F (37.5 °C) (Temporal)   Resp 21   Ht 1.778 m (5' 10\")   Wt 122 kg (268 lb 15.4 oz)   SpO2 93%   BMI 38.59 kg/m²   24HR INTAKE/OUTPUT:    Intake/Output Summary (Last 24 hours) at 5/28/2025 1832  Last data filed at 5/28/2025 1500  Gross per 24 hour   Intake 3256.98 ml   Output 3438 ml   Net -181.02 ml       Constitutional: Patient is presently intubated, sedated  HEENT: Pupils equal reactive, endotracheal tube in place  Respiratory: Decreased breath sounds at the bases  Cardiovascular/Edema: Heart sounds are regular  Gastrointestinal: Abdomen soft  Neurologic: Unable to level  Skin: No lesion  Other: + Edema    Scheduled Meds:   warfarin  1.5 mg Oral Once    midodrine  15 mg Oral TID WC    acetylcysteine  600 mg Inhalation BID RT    micafungin (MYCAMINE) 100 mg in sodium chloride 0.9 % 100 mL IVPB  100 mg IntraVENous Daily    ipratropium 0.5 mg-albuterol 2.5 mg  1 Dose Inhalation Q4H WA RT    budesonide  0.5 mg Nebulization BID RT    arformoterol tartrate  15 mcg Nebulization BID RT    [Held by provider] bumetanide  1 mg Oral Daily    chlorhexidine  15 mL Mouth/Throat BID    thiamine  100 mg IntraVENous Daily    lactulose  20 g Oral TID    warfarin placeholder: dosing by pharmacy   Oral RX Placeholder    atorvastatin  20 mg Oral Nightly    rifAXIMin  400 mg Oral TID    busPIRone  5 mg Oral BID    sodium chloride flush  5-40 mL IntraVENous 2 times per day    multivitamin  1 tablet Oral Daily    folic acid  1 mg Oral Daily    levETIRAcetam  500 mg IntraVENous Q12H    pantoprazole  40 mg IntraVENous BID     Continuous Infusions:   norepinephrine 7 mcg/min (05/28/25 1636)    [Held by provider] bumetanide (BUMEX) 12.5 mg in sodium chloride 0.9 % 125 mL infusion Stopped

## 2025-05-28 NOTE — PROGRESS NOTES
05/28/25 0137   Patient Observation   Pulse 76   Respirations 26   SpO2 98 %   Breath Sounds   Right Upper Lobe Diminished   Right Middle Lobe Diminished   Right Lower Lobe Diminished   Left Upper Lobe Diminished   Left Lower Lobe Diminished   Vent Information   Ventilator -51   Equipment Changed Expiratory Filter   Vent Mode AC/VC   Ventilator Settings   Vt (Set, mL) 450 mL   Resp Rate (Set) 18 bpm   PEEP/CPAP (cmH2O) 5   FiO2  40 %   Peak Inspiratory Flow (Set) 60 L/sec   Vent Patient Data (Readings)   Vt (Measured) 460 mL   Peak Inspiratory Pressure (cmH2O) 30 cmH2O   Rate Measured 20 br/min   Minute Volume (L/min) 9.17 Liters   Mean Airway Pressure (cmH2O) 12 cmH20   Plateau Pressure (cm H2O) 26 cm H2O   Driving Pressure 21   I:E Ratio 1:2.00   Flow Sensitivity 3 L/min   Static Compliance (L/cm H2O) 21   Vent Alarm Settings   High Pressure (cmH2O) 45 cmH2O   Low Minute Volume (lpm) 4 L/min   High Minute Volume (lpm) 15 L/min   Low Exhaled Vt (ml) 350 mL   High Exhaled Vt (ml) 800 mL   RR High (bpm) 35 br/min   Apnea (secs) 20 secs   Additional Respiratoray Assessments   Humidification Source Heated wire   Humidification Temp 37   Circuit Condensation Drained   Ambu Bag With Mask At Bedside Yes   Airway Clearance   Suction ET Tube   Suction Device Inline suction catheter   Sputum Method Obtained Endotracheal   Sputum Amount Small   Sputum Color/Odor Tan   Sputum Consistency Thick   ETT    Placement Date/Time: 05/23/25 1513   Placement Verified By: Capnometry;Chest X-ray  Preoxygenation: Yes  Mask Ventilation: Ventilated by mask (1)  Airway Type: Cuffed  Airway Tube Size: 8 mm  Location: Oral  Insertion attempts: 1  Secured At: 25 cm  M...   Secured At 25 cm   Measured From Lips   ETT Placement Right   Secured By Commercial tube hinton   Site Assessment Dry

## 2025-05-28 NOTE — PROGRESS NOTES
[facility]  Department of Internal Medicine   Internal Medicine Residency   MICU Progress Note    Patient:  Len Dorsey 59 y.o. male  MRN: [id number]     Date of Service: 5/28/2025    Allergy: Patient has no known allergies.    Subjective     The patient was seen and examined at bedside. He remains to be intubated but was opening his eyes to voice and intermittently following commands.    24h change:   Hemoglobin stable    Objective     VS: BP (!) 119/53   Pulse 67   Temp 99.5 °F (37.5 °C) (Temporal)   Resp 19   Ht 1.778 m (5' 10\")   Wt 122 kg (268 lb 15.4 oz)   SpO2 98%   BMI 38.59 kg/m²   ABP (Arterial line BP): 119/53  ABP Mean (Arterial Line Mean): 70 mmHg    I & O - 24hr:   Intake/Output Summary (Last 24 hours) at 5/28/2025 1258  Last data filed at 5/28/2025 1225  Gross per 24 hour   Intake 3256.98 ml   Output 2768 ml   Net 488.98 ml       Physical Exam:  General Appearance: intubated, sedated  Neck: no adenopathy, no carotid bruit, supple, symmetrical, trachea midline, and thyroid not enlarged, symmetric, no tenderness/mass/nodules  Lung: rales bilaterally, diminished breath sounds on the left  Heart: normal rate with irregular rhythm  Abdomen: distended, firm, no guarding  Extremities:   2+ pitting edema up to the thighs  Musculoskeletal: No joint swelling, no muscle tenderness. ROM normal in all joints of extremities.   Neurologic: Mental status: opens eyes to voice, intermittently follows commands    Lines     site day    Art line   R Radial 5/27/2025   TLC R IJ 5/28/2025   PICC None    Hemoaccess None      Mechanical Ventilation:  Mode: A/C   TV: 450 ml RR: 18  PEEP: 5 cmH2O FiO2: 40    ABG:     Lab Results   Component Value Date/Time    PH 7.436 05/28/2025 04:20 AM    PCO2 37.6 05/28/2025 04:20 AM    PO2 114.6 05/28/2025 04:20 AM    HCO3 24.7 05/28/2025 04:20 AM    BE 0.6 05/28/2025 04:20 AM    THB 8.0 05/28/2025 04:20 AM    O2SAT 98.5 05/28/2025 04:20 AM        Medications  unstable organ failure, including direct patient contact, management of life support systems, review of data including imaging and labs, discussions with other team members and physicians is at least 40 Min so far today, excluding procedures.    Sanjuana Gomez MD

## 2025-05-28 NOTE — PROGRESS NOTES
Department of Internal Medicine  Infectious Diseases  Progress Note      C/C:  Fever , leukocytosis , resp failure     Pt is intubated  Fever down     Current Facility-Administered Medications   Medication Dose Route Frequency Provider Last Rate Last Admin    warfarin (COUMADIN) tablet 1.5 mg  1.5 mg Oral Once Santo Long MD        micafungin (MYCAMINE) 100 mg in sodium chloride 0.9 % 100 mL IVPB  100 mg IntraVENous Daily Mikael Redding MD   Stopped at 05/27/25 1335    norepinephrine (LEVOPHED) 16 mg in sodium chloride 0.9 % 250 mL infusion (premix)  1-100 mcg/min IntraVENous Continuous Sara Matos MD 6.6 mL/hr at 05/28/25 0613 7 mcg/min at 05/28/25 0613    cefTRIAXone (ROCEPHIN) 1,000 mg in sterile water 10 mL IV syringe  1,000 mg IntraVENous Q24H Caitlyn Sagastume MD   1,000 mg at 05/27/25 1735    ipratropium 0.5 mg-albuterol 2.5 mg (DUONEB) nebulizer solution 1 Dose  1 Dose Inhalation Q4H WA RT Caitlyn Sagastume MD   1 Dose at 05/28/25 0839    budesonide (PULMICORT) nebulizer suspension 500 mcg  0.5 mg Nebulization BID RT Caitlyn Sagastume MD   500 mcg at 05/28/25 0839    arformoterol tartrate (BROVANA) nebulizer solution 15 mcg  15 mcg Nebulization BID RT Caitlyn Sagastume MD   15 mcg at 05/28/25 0839    [Held by provider] bumetanide (BUMEX) 12.5 mg in sodium chloride 0.9 % 125 mL infusion  0.2 mg/hr IntraVENous Continuous Caitlyn Sagastume MD   Stopped at 05/27/25 1143    [Held by provider] bumetanide (BUMEX) tablet 1 mg  1 mg Oral Daily RosieValentín murray MD   1 mg at 05/26/25 0806    chlorhexidine (PERIDEX) 0.12 % solution 15 mL  15 mL Mouth/Throat BID Aurelio Montoya MD   15 mL at 05/28/25 1014    fentaNYL (SUBLIMAZE) 1,000 mcg in sodium chloride 0.9% 100 mL infusion   mcg/hr IntraVENous Continuous Aurelio Montoya MD 12.5 mL/hr at 05/28/25 0613 125 mcg/hr at 05/28/25 0613    thiamine (B-1) injection 100 mg  100 mg IntraVENous Daily Aurelio Montoya MD   100 mg  \"[id number]\"    MICROBIOLOGY:    Blood culture - neg to date     Sputum culture - normal shira         Component  Ref Range & Units (hover) 5/23/25 1345   1,3 Beta-D-Glucan 91   1,3 Beta-D-Glucan Interp Positive Abnormal    Comment: (NOTE)            Radiology :    CT head - 5/19 /2025    1. Abdominopelvic: Increased 3rd spacing of fluid.  Mild colonic ileus.   Incidental findings are described above.   2. Head: No acute intracranial abnormality.  Chronic ischemic changes   redemonstrated.       IMPRESSION:     Resp failure - intubated   Encephalopathy / alcoholic liver disease   Hx of mitral and aortic valve replacement , pacer insertion   Low grade fever   Leukocytosis improved   Beta D glucan antigenemia       RECOMMENDATIONS:      Micafungin 100 mg IV q  24 hrs, ( no Diflucan -on coumadin )

## 2025-05-28 NOTE — PROGRESS NOTES
DAILY VENTILATOR WEANING ASSESSMENT PERFORMED    P/FIO2 Ratio =  287        (<100= do not Wean)                  Cs =       20                   (<32= Instability)  Plat. Pressure = 28    Was SAT completed no,     If yes proceed with the Spontaneous Breathing Trial (SBT) as long as none of the following exclusion criteria are met:     [] P/FIO2 <100  [] FiO2 >50%  [] Peep >10 CMH2O  [] Patient on vasopressor  [] Failed SAT  [] SpO2 < 88%  [] Active MI  [] Lack of inspiratory effort     If any exclusion criteria are meet Do Not Proceed to SBT.    Was SBT completed no    Ask Physician for a weaning plan no    Instabilities:    Cardiovascular     CNS      [] Mean BP less than or equal to 75   [] Neuromuscular blockade  [] Heart Rate greater than 130   [] RASS of -3, -4, -5  [] Mechanical Assist Device    []  RASS of +3, +4         [] ICP > 15 or Intracranial Hypertension         Respiratory      Metabolic   [] Temp. (8hrs) < 95 or > 103  [] Respiratory Rate greater than 35   [] WBC < 5000 or > 10469  [] Minute Volume greater than 15L  [] pH less than 7.30  [] Deteriorating chest X-ray         Parameters    no    NIF=  VC=  MV =  RSBI =      Additional Comments:     Performed by Patience Tabor RCP RRT

## 2025-05-28 NOTE — PROGRESS NOTES
05/28/25 0844   Patient Observation   Pulse 73   SpO2 98 %   Vent Information   Vent Mode AC/VC   Ventilator Settings   Vt (Set, mL) 450 mL   Resp Rate (Set) 18 bpm   PEEP/CPAP (cmH2O) 5   FiO2  40 %   Peak Inspiratory Flow (Set) 60 L/sec   Vent Patient Data (Readings)   Vt (Measured) 498 mL   Peak Inspiratory Pressure (cmH2O) 29 cmH2O   Rate Measured 21 br/min   Minute Volume (L/min) 9.6 Liters   Mean Airway Pressure (cmH2O) 13 cmH20   Plateau Pressure (cm H2O) 28 cm H2O   Driving Pressure 23   I:E Ratio 1:3.00   Flow Sensitivity 3 L/min   Static Compliance (L/cm H2O) 20   Backup Apnea On   Backup Rate 18 Breaths Per Minute   Backup Vt 450   Vent Alarm Settings   High Pressure (cmH2O) 45 cmH2O   Low Minute Volume (lpm) 4 L/min   High Minute Volume (lpm) 15 L/min   Low Exhaled Vt (ml) 350 mL   High Exhaled Vt (ml) 800 mL   RR High (bpm) 35 br/min   Apnea (secs) 20 secs   Additional Respiratoray Assessments   Humidification Source Heated wire   Humidification Temp 37   Circuit Condensation Drained   Ambu Bag With Mask At Bedside Yes   Airway Clearance   Suction ET Tube   Suction Device Inline suction catheter   Sputum Method Obtained Endotracheal   Sputum Amount Other (comment)  (none)   ETT    Placement Date/Time: 05/23/25 1513   Placement Verified By: Capnometry;Chest X-ray  Preoxygenation: Yes  Mask Ventilation: Ventilated by mask (1)  Airway Type: Cuffed  Airway Tube Size: 8 mm  Location: Oral  Insertion attempts: 1  Secured At: 25 cm  M...   Secured At 25 cm   Measured From Lips   Secured By Commercial tube hinton   Site Assessment Dry

## 2025-05-28 NOTE — PROGRESS NOTES
carotid bruits  Abdomen: soft, non-tender, non-distended, normal bowel sounds, no masses or organomegaly  Extremities: no cyanosis, no clubbing and no edema  Neurologic: Sedated        Recent Labs     05/27/25  0742 05/27/25 2119 05/28/25  0401    144 143   K 4.3 4.2 4.0    106 106   CO2 26 23 24   BUN 44* 47* 48*   CREATININE 2.6* 2.7* 2.5*   GLUCOSE 112* 122* 121*   CALCIUM 9.0 9.1 9.5       Recent Labs     05/26/25  0418 05/27/25  0419 05/27/25  1200 05/27/25 2000 05/28/25  0401   WBC 9.5 8.0  --   --  11.2   RBC 2.75* 2.56*  --   --  2.66*   HGB 7.1* 6.7* 7.3* 7.1* 7.0*   HCT 24.5* 22.7* 25.1* 24.4* 23.8*   MCV 89.1 88.7  --   --  89.5   MCH 25.8* 26.2  --   --  26.3   MCHC 29.0* 29.5*  --   --  29.4*   RDW 21.5* 22.4*  --   --  23.6*   MPV Can not be calculated Can not be calculated  --   --  Can not be calculated     Labs and imaging reviewed.    Assessment:    Acute hypoxic respiratory failure  Acute encephalopathy  Septic shock unclear etiology  Supratherapeutic INR  Delirium tremens with alcohol withdrawal syndrome  New onset seizure  FRANTZ on CKD 3a  Hypotension, on midodrine  Liver cirrhosis  Acute on Chronic anemia  Chronic alcoholic  History of heart valve replacement on warfarin  History of AICD  Elevated QTc  Multiple falls  History of CVA  Hypernatremia       Plan:  Transferred out of the ICU on 5/22, transferred back to the ICU 5/23, intubated and sedated  Neurology consulted, on Keppra, high-dose thiamine, CIWA protocol, unable to have MRI, seizure precautions. EEG completed 5/23- moderate nonspecific encephalopathy, No seizures or epileptiform discharges were noted during this study.  Currently on Keppra  Nephrology following for FRANTZ on CKD, off bumex   Back on levophed, started on midodrine and cortisol checked today.  Status post vitamin K, monitoring PT/INR, pharmacy dosing warfarin.  On lactulose, rifaximin   N.p.o., TF  ID following, off aztreonam currently on micafungin following  cultures  Monitor LFT's, right upper quadrant ultrasound with cholelithiasis, no signs of cholecystitis, steatosis.  Continue Lactulose and Rifaximin.     Palliative care consult for goals of care    Code Status: Full Code  DVT/GI Prophylaxis: Warfarin     Dispo: Pending clinical course.      NOTE: This report was transcribed using voice recognition software. Every effort was made to ensure accuracy; however, inadvertent computerized transcription errors may be present.  Electronically signed by Elsi Weber MD on 5/28/2025 at 1:58 PM

## 2025-05-28 NOTE — PROGRESS NOTES
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     Weight:   Wt Readings from Last 1 Encounters:   05/22/25 122 kg (268 lb 15.4 oz)       TSH:    Lab Results   Component Value Date/Time    TSH 2.98 05/20/2025 04:31 AM       Hepatic Function Panel:                            Lab Results   Component Value Date/Time    ALKPHOS 311 05/26/2025 04:18 AM    ALT 10 05/26/2025 04:18 AM    AST 50 05/26/2025 04:18 AM    BILITOT 1.5 05/26/2025 04:18 AM    BILIDIR 1.5 05/23/2025 12:18 PM    IBILI 0.5 05/23/2025 12:18 PM         Recent Labs     05/27/25  1200 05/27/25  2000 05/28/25  0401   HGB 7.3* 7.1* 7.0*     Date Warfarin Dose INR Heparin or LMWH Comment   5/20 1 mg 3.2 -- VitK 10 mg x1 on 5/19  Meropenem started   5/21 1 mg- medication held  2 -- Warfarin held for IR procedure   5/22 1.5 mg 2 --    5/23 1mg  2.4 --    5/24 0.5 mg 2.9 --    5/25 0.5 mg 2.9 --    5/26 1 mg 2.5 --    5/27 1.5 mg 2.1 --    5/28 1.5 mg 2.3 --      Assessment:  Patient is a 59 y.o. male on warfarin for Atrial Fibrillation, AVR, and mMVR.  Patient's home warfarin dosing regimen is 1 mg daily - there is some questions surrounding compliance.   Goal INR 2.5 - 3.5    Plan:  Warfarin 1.5  mg x 1 tonight  Daily PT/INR until the INR is stable within the therapeutic range  Pharmacist will follow and monitor/adjust dosing as necessary    Thank you for this consult,    Holly Meraz, PharmD, BCPS, BCCCP 5/28/2025 10:16 AM ]

## 2025-05-29 NOTE — PLAN OF CARE
Problem: Chronic Conditions and Co-morbidities  Goal: Patient's chronic conditions and co-morbidity symptoms are monitored and maintained or improved  Outcome: Progressing  Flowsheets (Taken 5/28/2025 2000)  Care Plan - Patient's Chronic Conditions and Co-Morbidity Symptoms are Monitored and Maintained or Improved:   Monitor and assess patient's chronic conditions and comorbid symptoms for stability, deterioration, or improvement   Collaborate with multidisciplinary team to address chronic and comorbid conditions and prevent exacerbation or deterioration   Update acute care plan with appropriate goals if chronic or comorbid symptoms are exacerbated and prevent overall improvement and discharge     Problem: Safety - Adult  Goal: Free from fall injury  Outcome: Progressing  Flowsheets (Taken 5/28/2025 2000)  Free From Fall Injury:   Instruct family/caregiver on patient safety   Based on caregiver fall risk screen, instruct family/caregiver to ask for assistance with transferring infant if caregiver noted to have fall risk factors     Problem: Pain  Goal: Verbalizes/displays adequate comfort level or baseline comfort level  Outcome: Progressing  Flowsheets (Taken 5/28/2025 2000)  Verbalizes/displays adequate comfort level or baseline comfort level: Assess pain using appropriate pain scale     Problem: Safety - Medical Restraint  Goal: Remains free of injury from restraints (Restraint for Interference with Medical Device)  Description: INTERVENTIONS:1. Determine that other, less restrictive measures have been tried or would not be effective before applying the restraint2. Evaluate the patient's condition at the time of restraint application3. Inform patient/family regarding the reason for restraint4. Q2H: Monitor safety, psychosocial status, comfort, nutrition and hydration  Outcome: Progressing  Flowsheets  Taken 5/28/2025 2200  Remains free of injury from restraints (restraint for interference with medical device):

## 2025-05-29 NOTE — CARE COORDINATION
Care Coordination: LOS 10 day.  Tx to 64 on 5/22 and returned to ICU on 5/23/25, acute encephalopathy, acute resp failure. Intubated.  Palliative following, full code. Vent, Fentanyl, levophed, NGT/TF.  Will follow for transition of care needs.    Electronically signed by Laquita Goodwin RN on 5/29/2025 at 3:05 PM

## 2025-05-29 NOTE — PROGRESS NOTES
Patient attempting to pull at lines and tubes. At this time, patient will remain in 2 point wrist restraints for the safety of lines and tubes present. Will continue to educate and reassess patient's understanding of education throughout shift.

## 2025-05-29 NOTE — PROGRESS NOTES
05/29/25 1207   Patient Observation   Pulse 84   SpO2 98 %   Vent Information   Vent Mode (S)  CPAP/PS   Ventilator Settings   PEEP/CPAP (cmH2O) 5   FiO2  35 %   Pressure Support (cm H2O) 10 cm H2O   Vent Patient Data (Readings)   Vt (Measured) 284 mL   Peak Inspiratory Pressure (cmH2O) 16 cmH2O   Rate Measured 20 br/min   Minute Volume (L/min) 4.86 Liters   Mean Airway Pressure (cmH2O) 7 cmH20   Plateau Pressure (cm H2O) 25 cm H2O   Driving Pressure 20   I:E Ratio 1:5.60   Flow Sensitivity 3 L/min   Backup Apnea On   Backup Rate 18 Breaths Per Minute   Backup Vt 450   Vent Alarm Settings   High Pressure (cmH2O) 45 cmH2O   Low Minute Volume (lpm) 4 L/min   High Minute Volume (lpm) 15 L/min   Low Exhaled Vt (ml) 350 mL   High Exhaled Vt (ml) 800 mL   RR High (bpm) 35 br/min   Apnea (secs) 20 secs   Additional Respiratoray Assessments   Humidification Source Heated wire   Humidification Temp 37   Circuit Condensation Not drained   Ambu Bag With Mask At Bedside Yes   Airway Clearance   Suction ET Tube   Suction Device Inline suction catheter   Sputum Method Obtained Endotracheal   Sputum Amount Small   Sputum Color/Odor Tan;Yellow   Sputum Consistency Thick   ETT    Placement Date/Time: 05/23/25 1513   Placement Verified By: Capnometry;Chest X-ray  Preoxygenation: Yes  Mask Ventilation: Ventilated by mask (1)  Airway Type: Cuffed  Airway Tube Size: 8 mm  Location: Oral  Insertion attempts: 1  Secured At: 25 cm  M...   Secured At 25 cm   Measured From Lips   Secured By Commercial tube hinton   Site Assessment Dry     Respiratory Wean Start time:  Order to wean ventilator Yes (if no, contact provider)    Patient placed on PS of 10, PEEP 5cmH2O, FIO2 35 at 1207PM.    HR 84bpm  RR 24bpm  SPO2 98%      Will continue to attempt to wean based on patient tolerance.       Performed by  Ptaience Tabor RCP RRT

## 2025-05-29 NOTE — PROGRESS NOTES
MICU team made aware of patient becoming more agitated and restless. Patient is coughing on the vent with increasing heart rate and Blood pressure.

## 2025-05-29 NOTE — PROGRESS NOTES
DAILY VENTILATOR WEANING ASSESSMENT PERFORMED    P/FIO2 Ratio =   245       (<100= do not Wean)                  Cs =    23                      (<32= Instability)  Plat. Pressure = 25    Was SAT completed yes,     If yes proceed with the Spontaneous Breathing Trial (SBT) as long as none of the following exclusion criteria are met:     [] P/FIO2 <100  [] FiO2 >50%  [] Peep >10 CMH2O  [] Patient on vasopressor  [] Failed SAT  [] SpO2 < 88%  [] Active MI  [] Lack of inspiratory effort     If any exclusion criteria are meet Do Not Proceed to SBT.    Was SBT completed yes    Ask Physician for a weaning plan yes    Instabilities:    Cardiovascular     CNS      [] Mean BP less than or equal to 75   [] Neuromuscular blockade  [] Heart Rate greater than 130   [] RASS of -3, -4, -5  [] Mechanical Assist Device    []  RASS of +3, +4         [] ICP > 15 or Intracranial Hypertension         Respiratory      Metabolic   [] Temp. (8hrs) < 95 or > 103  [] Respiratory Rate greater than 35   [] WBC < 5000 or > 88351  [] Minute Volume greater than 15L  [] pH less than 7.30  [] Deteriorating chest X-ray         Parameters    yes    NIF= -41  VC= 271 mL  MV = 7.17  RSBI = 57      Additional Comments: Spontaneous parameters performed on PS 5 35% +5 at 1240 pm. Patient did not have a cuff leak. RN notified.    Performed by Patience Tabor RCP RRT

## 2025-05-29 NOTE — FLOWSHEET NOTE
Inpatient Wound Care(initial evaluation) 4423    Admit Date: 5/19/2025 10:40 AM    Reason for consult:  legs     Significant history:  per H & P  Chief Complaint:  had concerns including Failure To Thrive (Patient not caring for self - canceling doctors appointments and not taking medications. Daily drinker. C/O SOB. ).  Past medical history of Alcoholic liver disease, H/O prosthetic aortic valve replacement, History of mitral valve replacement, Stroke (HCC), and Testicular cancer (HCC).      Findings:     05/28/25 1140   Skin Integumentary    Skin Integrity Ecchymosis   Location BUE  (left shoulder/chest)   Skin Integrity Site 2   Skin Integrity Location 2   (abraded areas)   Location 2 dorsal toes   Skin Integrity Site 3   Skin Integrity Location 3   (dry flaky skin)    Location 3 BLE   Skin Integrity Site 4   Skin Integrity Location 4   (dried scab)   Location 4 right dorsal foot   Wound 05/26/25 Leg Right;Lateral   Date First Assessed/Time First Assessed: 05/26/25 0000   Present on Original Admission: No  Primary Wound Type: Pressure Injury  Location: Leg  Wound Location Orientation: Right;Lateral   Wound Image    Wound Etiology Deep tissue/Injury   Dressing/Treatment Open to air   Wound Length (cm) 3 cm   Wound Width (cm) 2 cm   Wound Depth (cm) 0.1 cm   Wound Surface Area (cm^2) 6 cm^2   Change in Wound Size % (l*w) 36.84   Wound Volume (cm^3) 0.6 cm^3   Wound Healing % 37   Wound Assessment Purple/maroon   Drainage Amount None (dry)   Breanne-wound Assessment Dry/flaky   Wound 05/25/25 Leg Left;Posterior;Lower   Date First Assessed/Time First Assessed: 05/25/25 0330   Present on Original Admission: No  Wound Approximate Age at First Assessment (Weeks): 1 weeks  Primary Wound Type: Pressure Injury  Location: Leg  Wound Location Orientation: Left;Posterior;Lower   Wound Image    Wound Etiology Deep tissue/Injury   Dressing/Treatment Open to air   Wound Length (cm) 0.8 cm   Wound Width (cm) 3 cm   Wound Depth (cm)  0.1 cm   Wound Surface Area (cm^2) 2.4 cm^2   Wound Volume (cm^3) 0.24 cm^3   Wound Assessment Purple/maroon   Drainage Amount None (dry)   Breanne-wound Assessment Dry/flaky       **Informed Consent**    photos taken of wounds and inserted into their chart as part of their permanent medical record for purposes of documentation, treatment management and/or medical review.   All Images taken on 5/29/25 of patient name: Len Dorsey were transmitted and stored on secured Epic  Site located within Media Folder Tab by a registered [employer] Mobile Application Device.    Extremely stiff & difficult to turn  Intubated with vent support  Coccyx/buttocks/heels intact  Culver  2 point restraints  FMS     Impression:  DTI    Plan:  No local wound care to wounds  Heelmedix boots  Aquaphor to dry skin, buttocks, breanne area  Will need continued preventative care  Dietary consult      Sepideh Agarwal RN 5/29/2025 7:14 AM

## 2025-05-29 NOTE — PLAN OF CARE
Problem: Chronic Conditions and Co-morbidities  Goal: Patient's chronic conditions and co-morbidity symptoms are monitored and maintained or improved  5/29/2025 0850 by Valerie Cortez RN  Outcome: Progressing     Problem: Safety - Adult  Goal: Free from fall injury  5/29/2025 0850 by Valerie Cortez RN  Outcome: Progressing     Problem: Pain  Goal: Verbalizes/displays adequate comfort level or baseline comfort level  5/29/2025 0850 by Valerie Cortez RN  Outcome: Progressing     Problem: Safety - Medical Restraint  Goal: Remains free of injury from restraints (Restraint for Interference with Medical Device)  Description: INTERVENTIONS:1. Determine that other, less restrictive measures have been tried or would not be effective before applying the restraint2. Evaluate the patient's condition at the time of restraint application3. Inform patient/family regarding the reason for restraint4. Q2H: Monitor safety, psychosocial status, comfort, nutrition and hydration  5/29/2025 0850 by Valerie Cortez RN  Outcome: Progressing  Flowsheets  Taken 5/29/2025 0800 by Valerie Cortez RN  Remains free of injury from restraints (restraint for interference with medical device):   Determine that other, less restrictive measures have been tried or would not be effective before applying the restraint   Evaluate the patient's condition at the time of restraint application   Inform patient/family regarding the reason for restraint   Every 2 hours: Monitor safety, psychosocial status, comfort, nutrition and hydration    Problem: ABCDS Injury Assessment  Goal: Absence of physical injury  5/29/2025 0850 by Valerie Cortez RN  Outcome: Progressing   Problem: Skin/Tissue Integrity  Goal: Skin integrity remains intact  Description: 1.  Monitor for areas of redness and/or skin breakdown2.  Assess vascular access sites hourly3.  Every 4-6 hours minimum:  Change oxygen saturation probe site4.  Every 4-6 hours:  If on nasal continuous  positive airway pressure, respiratory therapy assess nares and determine need for appliance change or resting period  5/29/2025 0850 by Valerie Cortez RN  Outcome: Progressing     Problem: Nutrition Deficit:  Goal: Optimize nutritional status  5/29/2025 0850 by Valerie Cortez RN  Outcome: Progressing     Problem: Neurosensory - Adult  Goal: Achieves stable or improved neurological status  5/29/2025 0850 by Valerie Cortez RN  Outcome: Progressing     Problem: Skin/Tissue Integrity - Adult  Goal: Skin integrity remains intact  Description: 1.  Monitor for areas of redness and/or skin breakdown2.  Assess vascular access sites hourly3.  Every 4-6 hours minimum:  Change oxygen saturation probe site4.  Every 4-6 hours:  If on nasal continuous positive airway pressure, respiratory therapy assess nares and determine need for appliance change or resting period  5/29/2025 0850 by Valerie Cortez RN  Outcome: Progressing     Problem: Metabolic/Fluid and Electrolytes - Adult  Goal: Electrolytes maintained within normal limits  5/29/2025 0850 by Valerie Cortez RN  Outcome: Progressing     Problem: Metabolic/Fluid and Electrolytes - Adult  Goal: Hemodynamic stability and optimal renal function maintained  5/28/2025 2247 by Joellen Beyer RN  Outcome: Progressing  Flowsheets (Taken 5/28/2025 2000)  Hemodynamic stability and optimal renal function maintained:   Monitor labs and assess for signs and symptoms of volume excess or deficit   Monitor intake, output and patient weight   Monitor urine specific gravity, serum osmolarity and serum sodium as indicated or ordered   Monitor response to interventions for patient's volume status, including labs, urine output, blood pressure (other measures as available)       Problem: Metabolic/Fluid and Electrolytes - Adult  Goal: Hemodynamic stability and optimal renal function maintained  5/29/2025 0850 by Valerie Cortez RN  Outcome: Progressing

## 2025-05-29 NOTE — PROGRESS NOTES
Department of Internal Medicine  Infectious Diseases  Progress Note      C/C:  Fever , leukocytosis , resp failure     Pt is intubated  Fever down     Current Facility-Administered Medications   Medication Dose Route Frequency Provider Last Rate Last Admin    white petrolatum ointment   Topical BID Caitlyn Sagastume MD   Given at 05/29/25 0759    hydrocortisone sodium succinate PF (SOLU-CORTEF) 100 mg in sterile water 2 mL injection  100 mg IntraVENous Q8H Sara Matos MD        midodrine (PROAMATINE) tablet 15 mg  15 mg Oral TID WC Sara Matos MD   15 mg at 05/29/25 0757    midazolam PF (VERSED) injection 2 mg  2 mg IntraVENous Q4H PRN Caitlyn Sagastume MD   2 mg at 05/28/25 2032    acetylcysteine (MUCOMYST) 20 % solution 600 mg  600 mg Inhalation BID RT Caitlyn Sagastume MD   600 mg at 05/29/25 0954    micafungin (MYCAMINE) 100 mg in sodium chloride 0.9 % 100 mL IVPB  100 mg IntraVENous Daily Mikael Redding MD   Stopped at 05/29/25 0907    norepinephrine (LEVOPHED) 16 mg in sodium chloride 0.9 % 250 mL infusion (premix)  1-100 mcg/min IntraVENous Continuous Sara Matos MD 11.3 mL/hr at 05/29/25 0745 12 mcg/min at 05/29/25 0745    ipratropium 0.5 mg-albuterol 2.5 mg (DUONEB) nebulizer solution 1 Dose  1 Dose Inhalation Q4H WA RT Caitlyn Sagastume MD   1 Dose at 05/29/25 0954    budesonide (PULMICORT) nebulizer suspension 500 mcg  0.5 mg Nebulization BID RT Caitlyn Sagastume MD   500 mcg at 05/29/25 0954    arformoterol tartrate (BROVANA) nebulizer solution 15 mcg  15 mcg Nebulization BID RT Caitlyn Sagastume MD   15 mcg at 05/29/25 0954    [Held by provider] bumetanide (BUMEX) 12.5 mg in sodium chloride 0.9 % 125 mL infusion  0.2 mg/hr IntraVENous Continuous Caitlyn Sagastume MD   Stopped at 05/27/25 1143    [Held by provider] bumetanide (BUMEX) tablet 1 mg  1 mg Oral Daily RosieValentín murray MD   1 mg at 05/26/25 0806    chlorhexidine (PERIDEX) 0.12 % solution 15  Patient with orders to discharge home. Discharge instructions given and patient verbalized understanding. Patient has o2 that was delivered to room, also states that he has contacted his preferred pharmacy regarding co pay for apixaban and is in agreement with price and states it is affordable for him. Patient has all personal belonging in his own possession. IV and Tele removed. Family here for pickup and patient escorted to private vehicle.   Q6H PRN Len Arguello DO        polyethylene glycol (GLYCOLAX) packet 17 g  17 g Oral Daily PRN Len Arguello DO   17 g at 05/21/25 0853    levETIRAcetam (KEPPRA) injection 500 mg  500 mg IntraVENous Q12H Caitlyn Sagastume MD   500 mg at 05/28/25 2327    pantoprazole (PROTONIX) injection 40 mg  40 mg IntraVENous BID Caitlyn Sagastume MD   40 mg at 05/29/25 0757    glucose chewable tablet 16 g  4 tablet Oral PRN Caitlyn Sagastume MD        dextrose bolus 10% 125 mL  125 mL IntraVENous PRN Caitlyn Sagastume MD   Stopped at 05/20/25 0005    Or    dextrose bolus 10% 250 mL  250 mL IntraVENous PRN Caitlyn Sagastume MD        glucagon injection 1 mg  1 mg SubCUTAneous PRN Caitlyn Sagastume MD        dextrose 10 % infusion   IntraVENous Continuous PRN Caitlyn Sagastume MD           PHYSICAL EXAM:      Vitals:     BP (!) 120/52   Pulse 70   Temp 98.2 °F (36.8 °C) (Temporal)   Resp 20   Ht 1.778 m (5' 10\")   Wt 122 kg (268 lb 15.4 oz)   SpO2 100%   BMI 38.59 kg/m²     General Appearance:    Intubated, opened eyes to verbal stimuli   Head:    Normocephalic, atraumatic   Eyes:    No pallor, no icterus,   Ears:    No obvious deformity or drainage.   Nose:   No nasal drainage   Throat:   Mucosa dry   Neck:   Supple, no lymphadenopathy   Lungs:     Clear     Heart:    Regular rate ,systolic murmur, metallic click    Abdomen:     Soft, non-tender, bowel sounds present    Extremities:   + edema    Pulses:   Dorsalis pedis palpable      CBC with Differential:      Lab Results   Component Value Date/Time    WBC 13.6 05/29/2025 04:04 AM    RBC 2.89 05/29/2025 04:04 AM    HGB 7.6 05/29/2025 04:04 AM    HCT 25.9 05/29/2025 04:04 AM     05/25/2025 04:06 AM    MCV 89.6 05/29/2025 04:04 AM    MCH 26.3 05/29/2025 04:04 AM    MCHC 29.3 05/29/2025 04:04 AM    RDW 24.5 05/29/2025 04:04 AM    NRBC 1 05/20/2025 04:31 AM    METASPCT 2 03/06/2025 04:30 AM    LYMPHOPCT 9 05/29/2025 04:04 AM

## 2025-05-29 NOTE — PROGRESS NOTES
[facility] Hospitalist Progress Note    Admitting Date and Time: 5/19/2025 10:40 AM  Admit Dx: Failure to thrive in adult [R62.7]  FRANTZ (acute kidney injury) [N17.9]  Supratherapeutic INR [R79.1]  Anticoagulated on Coumadin [Z79.01]  Alcohol withdrawal syndrome without complication (HCC) [F10.890]    Patient presented to the emergency department with concerns about not being able to take care of himself.  Patient apparently been canceled doctors appointments and not taking medications.  Has a history of alcohol abuse and is a daily drinker.  Patient states not taking his medications and he ran out of them.  He is not going to doctors appointments because he cannot get a hold of the doctor.  Also reported some shortness of breath.  Denies any fever, chills, nausea, vomiting, chest pain, headache, dizziness, abdominal pain, hematuria.,  Dysuria or constipation or diarrhea.  Vital signs within normal limits and stable.  Although reviewing his vital signs throughout his ER course he did drop to 85% on room air.  He is currently 99% on 3 L nasal cannula.  Laboratory studies demonstrate BUN 22, creatinine 2.4, procalcitonin 0.19, troponin 74 with repeat of 67, proBNP 5566, alk phos 259, hemoglobin 8.3 and a WBC of 12.3.  INR is greater than 10.  CT imaging shows some increased third spacing of fluid.  Chest x-ray shows bibasilar opacities.  Patient was started on ceftriaxone and doxycycline for presumed pneumonia.  He was also noted to have seizure-like activity for which she was given Ativan.  He was also placed on CIWA protocol.  Patient be admitted to the ICU.  Patient continues to require pressors.  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

## 2025-05-29 NOTE — PROGRESS NOTES
Spontaneous Parameters performed on PS 5 35% +5    Patient does not has a cuff leak.    VT = 349 ml  f = 20  B/M  Ve = 7.17 L/M  NIF = -41  cmH2O  VC = 274 mL  RSBI = 57      Performed by Patience Tabor RCP

## 2025-05-29 NOTE — PROGRESS NOTES
Department of Internal Medicine  Nephrology Attending Consult Note    Events reviewed    SUBJECTIVE: We are following Mr. Dorsey for FRANTZ on CKD.  Presently intubated    PHYSICAL EXAM:      Vitals:    VITALS:  BP (!) 120/52   Pulse 70   Temp 98.2 °F (36.8 °C) (Temporal)   Resp 20   Ht 1.778 m (5' 10\")   Wt 122 kg (268 lb 15.4 oz)   SpO2 100%   BMI 38.59 kg/m²   24HR INTAKE/OUTPUT:    Intake/Output Summary (Last 24 hours) at 5/29/2025 1019  Last data filed at 5/29/2025 0900  Gross per 24 hour   Intake 318.69 ml   Output 2310 ml   Net -1991.31 ml       Constitutional: Patient is presently intubated, sedated  HEENT: Pupils equal reactive, endotracheal tube in place  Respiratory: Decreased breath sounds at the bases  Cardiovascular/Edema: Heart sounds are regular  Gastrointestinal: Abdomen soft  Neurologic: Unable to level  Skin: No lesion  Other: + Edema    Scheduled Meds:   white petrolatum   Topical BID    hydrocortisone sodium succinate PF (SOLU-CORTEF) 100 mg in sterile water 2 mL injection  100 mg IntraVENous Q8H    midodrine  15 mg Oral TID WC    acetylcysteine  600 mg Inhalation BID RT    micafungin (MYCAMINE) 100 mg in sodium chloride 0.9 % 100 mL IVPB  100 mg IntraVENous Daily    ipratropium 0.5 mg-albuterol 2.5 mg  1 Dose Inhalation Q4H WA RT    budesonide  0.5 mg Nebulization BID RT    arformoterol tartrate  15 mcg Nebulization BID RT    [Held by provider] bumetanide  1 mg Oral Daily    chlorhexidine  15 mL Mouth/Throat BID    thiamine  100 mg IntraVENous Daily    lactulose  20 g Oral TID    warfarin placeholder: dosing by pharmacy   Oral RX Placeholder    atorvastatin  20 mg Oral Nightly    rifAXIMin  400 mg Oral TID    busPIRone  5 mg Oral BID    sodium chloride flush  5-40 mL IntraVENous 2 times per day    multivitamin  1 tablet Oral Daily    folic acid  1 mg Oral Daily    levETIRAcetam  500 mg IntraVENous Q12H    pantoprazole  40 mg IntraVENous BID     Continuous Infusions:   norepinephrine 12  mcg/min (05/29/25 0745)    [Held by provider] bumetanide (BUMEX) 12.5 mg in sodium chloride 0.9 % 125 mL infusion Stopped (05/27/25 1143)    fentaNYL 200 mcg/hr (05/29/25 0810)    sodium chloride Stopped (05/22/25 2105)    dextrose       PRN Meds:.midazolam, white petrolatum, sodium chloride flush, sodium chloride, acetaminophen **OR** acetaminophen, melatonin, ondansetron **OR** ondansetron, polyethylene glycol, glucose, dextrose bolus **OR** dextrose bolus, glucagon (rDNA), dextrose      DATA:    CBC:   Lab Results   Component Value Date/Time    WBC 13.6 05/29/2025 04:04 AM    RBC 2.89 05/29/2025 04:04 AM    HGB 7.6 05/29/2025 04:04 AM    HCT 25.9 05/29/2025 04:04 AM    MCV 89.6 05/29/2025 04:04 AM    MCH 26.3 05/29/2025 04:04 AM    MCHC 29.3 05/29/2025 04:04 AM    RDW 24.5 05/29/2025 04:04 AM     05/25/2025 04:06 AM    MPV Can not be calculated 05/29/2025 04:04 AM     CMP:    Lab Results   Component Value Date/Time     05/29/2025 04:04 AM    K 4.0 05/29/2025 04:04 AM    K 3.9 09/15/2022 03:08 PM     05/29/2025 04:04 AM    CO2 22 05/29/2025 04:04 AM    BUN 43 05/29/2025 04:04 AM    CREATININE 2.2 05/29/2025 04:04 AM    GFRAA >60 09/15/2022 03:08 PM    LABGLOM 34 05/29/2025 04:04 AM    LABGLOM 31 03/05/2024 05:57 AM    GLUCOSE 114 05/29/2025 04:04 AM    CALCIUM 9.3 05/29/2025 04:04 AM    BILITOT 1.4 05/29/2025 04:04 AM    ALKPHOS 299 05/29/2025 04:04 AM    AST 42 05/29/2025 04:04 AM    ALT 15 05/29/2025 04:04 AM     Magnesium:    Lab Results   Component Value Date/Time    MG 1.9 05/29/2025 04:04 AM     Phosphorus:    Lab Results   Component Value Date/Time    PHOS 3.9 05/29/2025 04:04 AM     Radiology Review:        FINDINGS:  Head: No acute intracranial hemorrhage, mass effect or hydrocephalus.. Redemonstrated old infarcts of right more than left cerebrum and cerebellum  and chronic white matter small vessel ischemic changes.     Abdominopelvic: Prominent subcutaneous edema is worse compared to

## 2025-05-29 NOTE — PROGRESS NOTES
05/29/25 0959   Patient Observation   Pulse 70   SpO2 100 %   Vent Information   Equipment Changed Expiratory Filter   Vent Mode AC/VC   Ventilator Settings   Vt (Set, mL) 450 mL   Resp Rate (Set) 18 bpm   PEEP/CPAP (cmH2O) 5   FiO2  40 %   Peak Inspiratory Flow (Set) 60 L/sec   Vent Patient Data (Readings)   Vt (Measured) 458 mL   Peak Inspiratory Pressure (cmH2O) 29 cmH2O   Rate Measured 20 br/min   Minute Volume (L/min) 8.95 Liters   Peak Inspiratory Flow (lpm) 60 L/sec   Mean Airway Pressure (cmH2O) 12 cmH20   Plateau Pressure (cm H2O) 25 cm H2O   Driving Pressure 20   I:E Ratio 1:2.40   Flow Sensitivity 3 L/min   Static Compliance (L/cm H2O) 23   Backup Apnea On   Backup Rate 18 Breaths Per Minute   Backup Vt 450   Vent Alarm Settings   High Pressure (cmH2O) 45 cmH2O   Low Minute Volume (lpm) 4 L/min   High Minute Volume (lpm) 15 L/min   Low Exhaled Vt (ml) 350 mL   High Exhaled Vt (ml) 800 mL   RR High (bpm) 35 br/min   Apnea (secs) 20 secs   Additional Respiratoray Assessments   Humidification Source Heated wire   Humidification Temp 37   Circuit Condensation Drained   Ambu Bag With Mask At Bedside Yes   ETT    Placement Date/Time: 05/23/25 1513   Placement Verified By: Capnometry;Chest X-ray  Preoxygenation: Yes  Mask Ventilation: Ventilated by mask (1)  Airway Type: Cuffed  Airway Tube Size: 8 mm  Location: Oral  Insertion attempts: 1  Secured At: 25 cm  M...   Secured At 25 cm   Measured From Lips   Secured By Commercial tube hinton   Site Assessment Dry

## 2025-05-29 NOTE — PROGRESS NOTES
Comprehensive Nutrition Assessment    Type and Reason for Visit:  Reassess    Nutrition Recommendations/Plan:   Continue NPO, Modify Tube Feeding to better meet estimated nutrient needs    Peptide based @ 75 ml/hr to provide 1800 ml tv, 2160 kcals, 135 gm pro, 1460 ml free water       Malnutrition Assessment:  Malnutrition Status:  At risk for malnutrition (05/22/25 1224)    Context:  Chronic Illness     Findings of the 6 clinical characteristics of malnutrition:  Energy Intake:  75% or less estimated energy requirements for 1 month or longer (per RD encounter review)  Weight Loss:  Unable to assess (d/t fluid shifts w/ CHF / CKD)     Body Fat Loss:  No body fat loss     Muscle Mass Loss:  No muscle mass loss    Fluid Accumulation:  No fluid accumulation     Strength:  Not Performed    Nutrition Assessment:    Pt briefly transferred out of ICU & transferred back 5/23 2/2 seizure like activity s/p intubated. Noted septic shock - unclear etiology, hepatic encephalopathy, DTs w/ ETOH withdrawal. Admit d/t FTT, SOB, AMS noted not able to care for self or taking meds. Noted FRANTZ on CKD, PNA, sz activity in ED. PMHx ETOH liver cirrhosis, HLD, CVD, AICD, AVR/MVR, CVA, CHF, HTN, CAD s/p CABG 20', COPD, testicular CA s/p chemo. Multiple DTIs noted per wound care. NGT LIS currently. Will provide updated TF recs and continue to monitor.    Nutrition Related Findings:    pt intubated/sedated, hypotension on pressor x1, NGT LIS, hypoactive BS, abd distention, FMS (on lactulose), +2-3 generalized edema, bili 1.4, ammonia WNL Wound Type: Multiple, Deep Tissue Injury       Current Nutrition Intake & Therapies:    Average Meal Intake: NPO     Diet NPO  ADULT TUBE FEEDING; Nasogastric; Diabetic; Continuous; 20; Yes; 10; Q 4 hours; 75; 150; Q 3 hours  Current Tube Feeding (TF) Orders:  Feeding Route: Nasogastric  Formula: Diabetic  Schedule: Continuous  Feeding Regimen: 75 ml/hr, NGT LIS  Water Flushes: 150 ml q 3 hrs = 1200 ml

## 2025-05-29 NOTE — PROGRESS NOTES
Palliative Care Department  [phone]  Palliative Care Progress Note  Provider Katalina Gomez, APRN - CNP     Len Dorsey  [id number]  Hospital Day: 11  Date of Initial Consult: 5/23/25  Referring Provider: Ramila Chase MD   Palliative Medicine was consulted for assistance with: Goals of Care    HPI:   Len Dorsey is a 59 y.o. with a medical history of alcoholic liver disease, aortic valve replacement, mitral valve replacement, HFrEF 40-45%, CVA, testicular ca s/p chemotherapy, CAD s/p CABG  who was admitted on 5/19/2025 from home with a CHIEF COMPLAINT of altered mental status.  Patient's girlfriend/POA called EMS for patient being confused and \"dazed\".  He has a history of alcoholism, but his last drink was February 17, 2025.  In ER, found to be hypoxic 85%, supratherapeutic INR greater than 10, and had witnessed seizure activity described as generalized jerking with eyes deviated lasting 45 seconds.  CT head negative for acute findings, he was admitted to ICU for closer monitoring.  Neurology and nephrology consulted.  He was stabilized and transferred out of ICU on 5/22, 5/23 returns to ICU for worsening respiratory status and lethargy.  Palliative medicine consulted for further assistance with goals of care.  ASSESSMENT/PLAN:     Pertinent Hospital Diagnoses     Altered mental status  Acute respiratory failure  Supratherapeutic INR  Acute renal failure  Alcoholic liver cirrhosis  CHF    Palliative Care Encounter / Counseling Regarding Goals of Care  Please see detailed goals of care discussion as below  At this time, Len Dorsey, Does Not have capacity for medical decision-making. Capacity is time limited and situation/question specific  During encounter Leonie was surrogate medical decision-maker  Outcome of goals of care meeting:   POA agreeable to tracheostomy if indicated  Continue full code  Continue current medical management  Code status Full Code  Advanced Directives: POA or  Len Dorsey.

## 2025-05-29 NOTE — PROGRESS NOTES
[facility]  Department of Internal Medicine   Internal Medicine Residency   MICU Progress Note    Patient:  Len Dorsey 59 y.o. male  MRN: [id number]     Date of Service: 5/29/2025    Allergy: Patient has no known allergies.    Subjective     The patient was seen and examined at bedside.  Patient is still intubated, sedated on fentanyl, does not follow commands.  Patient did not have any cuff leak today on spontaneous breathing trial.  SAT and SBT tomorrow again.    Objective     VS: BP (!) 120/52   Pulse (!) 102   Temp 98.7 °F (37.1 °C) (Temporal)   Resp 20   Ht 1.778 m (5' 10\")   Wt 122 kg (268 lb 15.4 oz)   SpO2 100%   BMI 38.59 kg/m²   ABP (Arterial line BP): 154/63  ABP Mean (Arterial Line Mean): 100 mmHg    I & O - 24hr:   Intake/Output Summary (Last 24 hours) at 5/29/2025 1444  Last data filed at 5/29/2025 1400  Gross per 24 hour   Intake 290 ml   Output 2410 ml   Net -2120 ml       Physical Exam:  General Appearance: intubated, sedated  Neck: no adenopathy, no carotid bruit, supple, symmetrical, trachea midline, and thyroid not enlarged, symmetric, no tenderness/mass/nodules  Lung: rales bilaterally, diminished breath sounds on the left  Heart: normal rate with irregular rhythm  Abdomen: distended, firm, no guarding  Extremities:   2+ pitting edema up to the thighs  Musculoskeletal: No joint swelling, no muscle tenderness. ROM normal in all joints of extremities.   Neurologic: Mental status: opens eyes to voice, intermittently follows commands    Lines     site day    Art line   R Radial 5/27/2025   TLC R IJ 5/28/2025   PICC None    Hemoaccess None      Mechanical Ventilation:  Mode: A/C   TV: 450 ml RR: 18  PEEP: 5 cmH2O FiO2: 40    ABG:     Lab Results   Component Value Date/Time    PH 7.457 05/29/2025 04:15 AM    PCO2 34.3 05/29/2025 04:15 AM    PO2 97.9 05/29/2025 04:15 AM    HCO3 23.7 05/29/2025 04:15 AM    BE 0.0 05/29/2025 04:15 AM    THB 8.5 05/29/2025 04:15 AM    O2SAT    Total critical care time caring for this patient with life threatening, unstable organ failure, including direct patient contact, management of life support systems, review of data including imaging and labs, discussions with other team members and physicians is at least 35Min so far today, excluding procedures.  Sanjuana Gomez MD

## 2025-05-29 NOTE — PROGRESS NOTES
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     Weight:   Wt Readings from Last 1 Encounters:   05/22/25 122 kg (268 lb 15.4 oz)       TSH:    Lab Results   Component Value Date/Time    TSH 2.98 05/20/2025 04:31 AM       Hepatic Function Panel:                            Lab Results   Component Value Date/Time    ALKPHOS 299 05/29/2025 04:04 AM    ALT 15 05/29/2025 04:04 AM    AST 42 05/29/2025 04:04 AM    BILITOT 1.4 05/29/2025 04:04 AM    BILIDIR 0.8 05/29/2025 04:04 AM    IBILI 0.6 05/29/2025 04:04 AM         Recent Labs     05/27/25  2000 05/28/25  0401 05/29/25  0404   HGB 7.1* 7.0* 7.6*     Date Warfarin Dose INR Comment   5/20 1 mg 3.2 VitK 10 mg x1 on 5/19  Meropenem started   5/21 1 mg- medication held  2 Warfarin held for IR procedure   5/22 1.5 mg 2    5/23 1mg  2.4    5/24 0.5 mg 2.9    5/25 0.5 mg 2.9    5/26 1 mg 2.5    5/27 1.5 mg 2.1    5/28 1.5 mg 2.3    5/29 1.5 mg 2.5                  Assessment:  Patient is a 59 y.o. male on warfarin for Atrial Fibrillation, AVR, and mMVR.  Patient's home warfarin dosing regimen is 1 mg daily - there is some questions surrounding compliance.   Goal INR 2.5 - 3.5    Plan:  Warfarin 1.5  mg x 1 tonight  Daily PT/INR until the INR is stable within the therapeutic range  Pharmacist will follow and monitor/adjust dosing as necessary    Thank you for this consult,    Holly Meraz, PharmD, BCPS, BCCCP 5/29/2025 11:29 AM ]

## 2025-05-30 NOTE — PROGRESS NOTES
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     Weight:   Wt Readings from Last 1 Encounters:   05/22/25 122 kg (268 lb 15.4 oz)       TSH:    Lab Results   Component Value Date/Time    TSH 2.98 05/20/2025 04:31 AM       Hepatic Function Panel:                            Lab Results   Component Value Date/Time    ALKPHOS 246 05/30/2025 04:09 AM    ALT 9 05/30/2025 04:09 AM    AST 23 05/30/2025 04:09 AM    BILITOT 1.4 05/30/2025 04:09 AM    BILIDIR 0.7 05/30/2025 04:09 AM    IBILI 0.7 05/30/2025 04:09 AM         Recent Labs     05/28/25  0401 05/29/25  0404 05/30/25  0409   HGB 7.0* 7.6* 7.8*     Date Warfarin Dose INR Comment   5/20 1 mg 3.2 VitK 10 mg x1 on 5/19  Meropenem started   5/21 1 mg- medication held  2 Warfarin held for IR procedure   5/22 1.5 mg 2    5/23 1mg  2.4    5/24 0.5 mg 2.9    5/25 0.5 mg 2.9    5/26 1 mg 2.5    5/27 1.5 mg 2.1    5/28 1.5 mg 2.3    5/29 1.5 mg 2.5    5/30 0.5 mg 3.2            Assessment:  Patient is a 59 y.o. male on warfarin for Atrial Fibrillation, AVR, and mMVR.  Patient's home warfarin dosing regimen is 1 mg daily - there is some questions surrounding compliance.   Goal INR 2.5 - 3.5    Plan:  Warfarin 0.5  mg x 1 tonight  Daily PT/INR until the INR is stable within the therapeutic range  Pharmacist will follow and monitor/adjust dosing as necessary    Thank you for this consult,    Holly Meraz, PharmD, BCPS, BCCCP 5/30/2025 10:59 AM ]

## 2025-05-30 NOTE — PROGRESS NOTES
[facility]  Department of Internal Medicine   Internal Medicine Residency   MICU Progress Note    Patient:  Len Dorsey 59 y.o. male  MRN: [id number]     Date of Service: 5/30/2025    Allergy: Patient has no known allergies.    Subjective     The patient was seen and examined at bedside.  Patient is still intubated, sedated on Precedex.  Patient does follow commands today.  Patient did not have enough tidal volume on pressure support today.  Midodrine increased, still requiring low dose of Levophed.    Objective     VS: BP (!) 113/52   Pulse 71   Temp 98.5 °F (36.9 °C) (Esophageal)   Resp 24   Ht 1.778 m (5' 10\")   Wt 122 kg (268 lb 15.4 oz)   SpO2 98%   BMI 38.59 kg/m²   ABP (Arterial line BP): 124/58  ABP Mean (Arterial Line Mean): 79 mmHg    I & O - 24hr:   Intake/Output Summary (Last 24 hours) at 5/30/2025 1523  Last data filed at 5/30/2025 1400  Gross per 24 hour   Intake 120.46 ml   Output 2120 ml   Net -1999.54 ml       Physical Exam:  General Appearance: intubated, sedated  Neck: no adenopathy, no carotid bruit, supple, symmetrical, trachea midline, and thyroid not enlarged, symmetric, no tenderness/mass/nodules  Lung: rales bilaterally, diminished breath sounds on the left  Heart: normal rate with irregular rhythm  Abdomen: distended, firm, no guarding  Extremities:   2+ pitting edema up to the thighs  Musculoskeletal: No joint swelling, no muscle tenderness. ROM normal in all joints of extremities.   Neurologic: Mental status: opens eyes to voice, follows commands    Lines     site day    Art line   R Radial 5/27/2025   TLC R IJ 5/28/2025   PICC None    Hemoaccess None      Mechanical Ventilation:  Mode: A/C   TV: 450 ml RR: 18  PEEP: 5 cmH2O FiO2: 40    ABG:     Lab Results   Component Value Date/Time    PH 7.472 05/30/2025 04:15 AM    PCO2 34.3 05/30/2025 04:15 AM    PO2 86.2 05/30/2025 04:15 AM    HCO3 24.5 05/30/2025 04:15 AM    BE 1.0 05/30/2025 04:15 AM    THB 8.9

## 2025-05-30 NOTE — PROGRESS NOTES
Spontaneous Parameters performed    VT = 328 ml  f = 20  B/M  Ve = 6.68 L/M  NIF = -36  cmH2O  VC = 491 L  RSBI = 71  Cuff leak- present      Performed by Javan Marvin RCP   05/30/25 1242   Patient Observation   Pulse 70   Respirations 20   SpO2 100 %   Breath Sounds   Respiratory Pattern Regular   Vent Information   Vent Mode CPAP/PS   Ventilator Settings   PEEP/CPAP (cmH2O) 5   FiO2  35 %   Vent Patient Data (Readings)   Vt (Measured) 328 mL   Peak Inspiratory Pressure (cmH2O) 14 cmH2O   Rate Measured 20 br/min   Minute Volume (L/min) 6.68 Liters   Mean Airway Pressure (cmH2O) 8 cmH20   Plateau Pressure (cm H2O) 28 cm H2O   Driving Pressure 23   I:E Ratio 1:2.80   Backup Apnea On   Backup Rate 18 Breaths Per Minute   Backup Vt 450   Vent Alarm Settings   High Pressure (cmH2O) 45 cmH2O   Low Minute Volume (lpm) 4 L/min   High Minute Volume (lpm) 15 L/min   Low Exhaled Vt (ml) 0 mL   High Exhaled Vt (ml) 800 mL   RR Low (bpm) 17   RR High (bpm) 25 br/min   Apnea (secs) 20 secs   Additional Respiratoray Assessments   Humidification Source Heated wire   Humidification Temp 36.7   Circuit Condensation Drained   Ambu Bag With Mask At Bedside Yes   ETT    Placement Date/Time: 05/23/25 1513   Placement Verified By: Capnometry;Chest X-ray  Preoxygenation: Yes  Mask Ventilation: Ventilated by mask (1)  Airway Type: Cuffed  Airway Tube Size: 8 mm  Location: Oral  Insertion attempts: 1  Secured At: 25 cm  M...   Secured At 25 cm   Measured From Lips   ETT Placement Center   Secured By Commercial tube hinton   Treatment   $Treatment Type $Inhaled Therapy/Meds

## 2025-05-30 NOTE — PROGRESS NOTES
05/30/25 1624   Patient Observation   Pulse 68   Respirations 17   SpO2 98 %   Vent Information   Ventilator Day(s) 8   Ventilator ID [id number]   Equipment Changed Expiratory Filter   Vent Mode (S)  CPAP/PS  (found back on ps)   Ventilator Settings   PEEP/CPAP (cmH2O) (S)  5   FiO2  35 %   Pressure Support (cm H2O) (S)  18 cm H2O   Vent Patient Data (Readings)   Vt (Measured) 578 mL   Peak Inspiratory Pressure (cmH2O) 25 cmH2O   Rate Measured 16 br/min   Minute Volume (L/min) 7.29 Liters   Mean Airway Pressure (cmH2O) 9 cmH20   Plateau Pressure (cm H2O) 28 cm H2O   Driving Pressure 23   I:E Ratio 1:4.50   Backup Apnea On   Backup Rate 18 Breaths Per Minute   Backup Vt 450   Vent Alarm Settings   High Pressure (cmH2O) 45 cmH2O   Low Minute Volume (lpm) 4 L/min   High Minute Volume (lpm) 15 L/min   Low Exhaled Vt (ml) 350 mL   High Exhaled Vt (ml) 800 mL   RR Low (bpm) 18   RR High (bpm) 35 br/min   Apnea (secs) 20 secs   Additional Respiratoray Assessments   Humidification Source Heated wire   Humidification Temp 37.4   Circuit Condensation Drained   Ambu Bag With Mask At Bedside Yes   Airway Clearance   Suction ET Tube   Suction Device Inline suction catheter   Sputum Method Obtained Endotracheal   Sputum Amount Moderate   Sputum Color/Odor Tan;Pink tinged   Sputum Consistency Thick   ETT    Placement Date/Time: 05/23/25 1513   Placement Verified By: Capnometry;Chest X-ray  Preoxygenation: Yes  Mask Ventilation: Ventilated by mask (1)  Airway Type: Cuffed  Airway Tube Size: 8 mm  Location: Oral  Insertion attempts: 1  Secured At: 25 cm  M...   Secured At 25 cm   Measured From Lips   ETT Placement (S)  Left   Secured By Commercial tube hinton

## 2025-05-30 NOTE — PROGRESS NOTES
05/30/25 0955   Patient Observation   Pulse 67   Respirations 18   SpO2 99 %   Ventilator Settings   Vt (Set, mL) 450 mL   Resp Rate (Set) 18 bpm   PEEP/CPAP (cmH2O) 5   FiO2  35 %   Vent Patient Data (Readings)   Vt (Measured) 427 mL   Peak Inspiratory Pressure (cmH2O) 32 cmH2O   Rate Measured 18 br/min   Minute Volume (L/min) 7.68 Liters   Mean Airway Pressure (cmH2O) 12 cmH20   Plateau Pressure (cm H2O) 26 cm H2O   Driving Pressure 21   I:E Ratio 1:3.10   PEEP Intrinsic (cm H2O) 0.6 cm H2O   Static Compliance (L/cm H2O) 21   Backup Apnea On   Backup Rate 18 Breaths Per Minute   Backup Vt 450   Vent Alarm Settings   High Pressure (cmH2O) 45 cmH2O   Low Minute Volume (lpm) 4 L/min   High Minute Volume (lpm) 15 L/min   Low Exhaled Vt (ml) 350 mL   High Exhaled Vt (ml) 800 mL   RR Low (bpm) 17   RR High (bpm) 35 br/min   Apnea (secs) 20 secs   Additional Respiratoray Assessments   Humidification Source Heated wire   Humidification Temp 36.7   Circuit Condensation Drained   Ambu Bag With Mask At Bedside Yes   Airway Clearance   Suction ET Tube   Suction Device Inline suction catheter   Sputum Method Obtained Endotracheal   Sputum Amount Small   Sputum Color/Odor Clear   Sputum Consistency Thin   ETT    Placement Date/Time: 05/23/25 1513   Placement Verified By: Capnometry;Chest X-ray  Preoxygenation: Yes  Mask Ventilation: Ventilated by mask (1)  Airway Type: Cuffed  Airway Tube Size: 8 mm  Location: Oral  Insertion attempts: 1  Secured At: 25 cm  M...   Secured At 25 cm   Measured From Lips   ETT Placement Center   Secured By Commercial tube hinton   Treatment   $Treatment Type $Inhaled Therapy/Meds

## 2025-05-30 NOTE — PROGRESS NOTES
Department of Internal Medicine  Infectious Diseases  Progress Note      C/C:  Fever , leukocytosis , resp failure     Pt is intubated  Fever down     Current Facility-Administered Medications   Medication Dose Route Frequency Provider Last Rate Last Admin    warfarin (COUMADIN) tablet 0.5 mg  0.5 mg Oral Daily Sanjuana Gomez MD        white petrolatum ointment   Topical BID Caitlyn Sagastume MD   Given at 05/30/25 0942    hydrocortisone sodium succinate PF (SOLU-CORTEF) 100 mg in sterile water 2 mL injection  100 mg IntraVENous Q8H Sara Matos MD   100 mg at 05/30/25 0425    dexmedeTOMIDine (PRECEDEX) 1,000 mcg in sodium chloride 0.9 % 250 mL infusion  0.1-1.5 mcg/kg/hr IntraVENous Continuous Angus Davenport MD 6.1 mL/hr at 05/30/25 0706 0.2 mcg/kg/hr at 05/30/25 0706    midodrine (PROAMATINE) tablet 15 mg  15 mg Oral TID WC Sara Matos MD   15 mg at 05/30/25 0942    midazolam PF (VERSED) injection 2 mg  2 mg IntraVENous Q4H PRN Caitlyn Sagastume MD   2 mg at 05/28/25 2032    acetylcysteine (MUCOMYST) 20 % solution 600 mg  600 mg Inhalation BID RT Caitlyn Sagastume MD   600 mg at 05/30/25 0953    micafungin (MYCAMINE) 100 mg in sodium chloride 0.9 % 100 mL IVPB  100 mg IntraVENous Daily Mikael Redding MD   Stopped at 05/30/25 1101    norepinephrine (LEVOPHED) 16 mg in sodium chloride 0.9 % 250 mL infusion (premix)  1-100 mcg/min IntraVENous Continuous Sara Matos MD 1.9 mL/hr at 05/30/25 0706 2 mcg/min at 05/30/25 0706    ipratropium 0.5 mg-albuterol 2.5 mg (DUONEB) nebulizer solution 1 Dose  1 Dose Inhalation Q4H WA RT Caitlyn Sagastume MD   1 Dose at 05/30/25 0953    budesonide (PULMICORT) nebulizer suspension 500 mcg  0.5 mg Nebulization BID RT Caitlyn Sagastume MD   500 mcg at 05/30/25 0953    arformoterol tartrate (BROVANA) nebulizer solution 15 mcg  15 mcg Nebulization BID RT Caitlyn Sagastume MD   15 mcg at 05/30/25 0953    [Held by provider] bumetanide (BUMEX)  colonziation       RECOMMENDATIONS:      Micafungin 100 mg IV q  24 hrs, ( no Diflucan -on coumadin )

## 2025-05-30 NOTE — PROGRESS NOTES
Department of Internal Medicine  Nephrology Attending Consult Note    Events reviewed    SUBJECTIVE: We are following Mr. Dorsey for FRANTZ on CKD.  Presently intubated    PHYSICAL EXAM:      Vitals:    VITALS:  BP (!) 113/52   Pulse 63   Temp 98.4 °F (36.9 °C) (Esophageal)   Resp 25   Ht 1.778 m (5' 10\")   Wt 122 kg (268 lb 15.4 oz)   SpO2 96%   BMI 38.59 kg/m²   24HR INTAKE/OUTPUT:    Intake/Output Summary (Last 24 hours) at 5/30/2025 1015  Last data filed at 5/30/2025 0706  Gross per 24 hour   Intake 1103.48 ml   Output 1935 ml   Net -831.52 ml       Constitutional: Patient is presently intubated, sedated  HEENT: Pupils equal reactive, endotracheal tube in place  Respiratory: Decreased breath sounds at the bases  Cardiovascular/Edema: Heart sounds are regular  Gastrointestinal: Abdomen soft  Neurologic: Unable to level  Skin: No lesion  Other: + Edema    Scheduled Meds:   white petrolatum   Topical BID    hydrocortisone sodium succinate PF (SOLU-CORTEF) 100 mg in sterile water 2 mL injection  100 mg IntraVENous Q8H    warfarin  1.5 mg Oral Daily    midodrine  15 mg Oral TID WC    acetylcysteine  600 mg Inhalation BID RT    micafungin (MYCAMINE) 100 mg in sodium chloride 0.9 % 100 mL IVPB  100 mg IntraVENous Daily    ipratropium 0.5 mg-albuterol 2.5 mg  1 Dose Inhalation Q4H WA RT    budesonide  0.5 mg Nebulization BID RT    arformoterol tartrate  15 mcg Nebulization BID RT    [Held by provider] bumetanide  1 mg Oral Daily    chlorhexidine  15 mL Mouth/Throat BID    thiamine  100 mg IntraVENous Daily    lactulose  20 g Oral TID    warfarin placeholder: dosing by pharmacy   Oral RX Placeholder    atorvastatin  20 mg Oral Nightly    rifAXIMin  400 mg Oral TID    busPIRone  5 mg Oral BID    sodium chloride flush  5-40 mL IntraVENous 2 times per day    multivitamin  1 tablet Oral Daily    folic acid  1 mg Oral Daily    levETIRAcetam  500 mg IntraVENous Q12H    pantoprazole  40 mg IntraVENous BID     Continuous  Infusions:   dexmedeTOMIDine (PRECEDEX) 1,000 mcg in sodium chloride 0.9 % 250 mL infusion 0.2 mcg/kg/hr (05/30/25 0706)    norepinephrine 2 mcg/min (05/30/25 0706)    [Held by provider] bumetanide (BUMEX) 12.5 mg in sodium chloride 0.9 % 125 mL infusion Stopped (05/27/25 1143)    fentaNYL Stopped (05/29/25 1130)    sodium chloride Stopped (05/22/25 2105)    dextrose       PRN Meds:.midazolam, white petrolatum, sodium chloride flush, sodium chloride, acetaminophen **OR** acetaminophen, melatonin, ondansetron **OR** ondansetron, polyethylene glycol, glucose, dextrose bolus **OR** dextrose bolus, glucagon (rDNA), dextrose      DATA:    CBC:   Lab Results   Component Value Date/Time    WBC 9.5 05/30/2025 04:09 AM    RBC 2.96 05/30/2025 04:09 AM    HGB 7.8 05/30/2025 04:09 AM    HCT 26.9 05/30/2025 04:09 AM    MCV 90.9 05/30/2025 04:09 AM    MCH 26.4 05/30/2025 04:09 AM    MCHC 29.0 05/30/2025 04:09 AM    RDW 25.0 05/30/2025 04:09 AM     05/25/2025 04:06 AM    MPV Can not be calculated 05/30/2025 04:09 AM     CMP:    Lab Results   Component Value Date/Time     05/30/2025 04:09 AM    K 4.2 05/30/2025 04:09 AM    K 3.9 09/15/2022 03:08 PM     05/30/2025 04:09 AM    CO2 22 05/30/2025 04:09 AM    BUN 46 05/30/2025 04:09 AM    CREATININE 2.0 05/30/2025 04:09 AM    GFRAA >60 09/15/2022 03:08 PM    LABGLOM 38 05/30/2025 04:09 AM    LABGLOM 31 03/05/2024 05:57 AM    GLUCOSE 152 05/30/2025 04:09 AM    CALCIUM 9.5 05/30/2025 04:09 AM    BILITOT 1.4 05/30/2025 04:09 AM    ALKPHOS 246 05/30/2025 04:09 AM    AST 23 05/30/2025 04:09 AM    ALT 9 05/30/2025 04:09 AM     Magnesium:    Lab Results   Component Value Date/Time    MG 2.3 05/30/2025 04:09 AM     Phosphorus:    Lab Results   Component Value Date/Time    PHOS 4.3 05/30/2025 04:09 AM     Radiology Review:        FINDINGS:  Head: No acute intracranial hemorrhage, mass effect or hydrocephalus.. Redemonstrated old infarcts of right more than left cerebrum and  cerebellum  and chronic white matter small vessel ischemic changes.     Abdominopelvic: Prominent subcutaneous edema is worse compared to prior. Redemonstrated left nephrolithiasis and cholelithiasis.  The renal lesions are less well demonstrated by noncontrast exam.  Some areas increased  attenuation probably hyperdense cysts but nonspecific consider evaluation on pre and post-contrast MRI or CT for enhancement evaluation as with neoplasm.  No intestinal obstruction.  Mild distension such as at the transverse colon suspicious for ileus.  Small pleural effusions.  Alveolar densities at least in the right base suggesting edema although nonspecific, with cardiomegaly  and postop changes.  Moderate ascites.     IMPRESSION:  1. Abdominopelvic: Increased 3rd spacing of fluid.  Mild colonic ileus. Incidental findings are described above.  2. Head: No acute intracranial abnormality.  Chronic ischemic changes redemonstrated.     Chest x-ray 5/19/2025    IMPRESSION:  1. Cardiomegaly with pulmonary vascular congestion.  2. Pleural based opacity in the right mid lower lung.  3. Subtle bibasilar opacity.             BRIEF SUMMARY OF INITIAL CONSULT:    Briefly Mr. Dorsey is a 59-year-old man with history of CKD stage IIIa, with large proteinuria, probably due to nephrosclerosis, baseline creatinine 1.9-2.1 mg/dL, HTN, CAD status post CABG, HFpEF 55%, valvular heart disease status post AVR and MVR both mechanical valves, post pacemaker placement, hyperlipidemia, CVA, testicular cancer status postchemotherapy, alcohol abuse, cirrhosis, who was admitted on who was admitted on 5/19/2025 after he presented to the ER with complaints of not being able to take care of himself and failure to thrive, in the ER CT head showed no acute abnormalities, CT abdomen pelvis showed mild colonic ileus, prominent subcutaneous edema is worsening, moderate ascites he is well was found to have a supratherapeutic INR >10, his ABGs show a pH of

## 2025-05-30 NOTE — PLAN OF CARE
Problem: Chronic Conditions and Co-morbidities  Goal: Patient's chronic conditions and co-morbidity symptoms are monitored and maintained or improved  5/29/2025 2245 by Milly Santiago RN  Outcome: Progressing     Problem: Safety - Adult  Goal: Free from fall injury  5/29/2025 2245 by Milly Santiago RN  Outcome: Progressing     Problem: Pain  Goal: Verbalizes/displays adequate comfort level or baseline comfort level  5/29/2025 2245 by Milly Santiago RN  Outcome: Progressing     Problem: Safety - Medical Restraint  Goal: Remains free of injury from restraints (Restraint for Interference with Medical Device)  Description: INTERVENTIONS:1. Determine that other, less restrictive measures have been tried or would not be effective before applying the restraint2. Evaluate the patient's condition at the time of restraint application3. Inform patient/family regarding the reason for restraint4. Q2H: Monitor safety, psychosocial status, comfort, nutrition and hydration  5/29/2025 2245 by Milly Santiago RN  Outcome: Progressing  Flowsheets (Taken 5/29/2025 2000)  Remains free of injury from restraints (restraint for interference with medical device):   Determine that other, less restrictive measures have been tried or would not be effective before applying the restraint   Evaluate the patient's condition at the time of restraint application   Inform patient/family regarding the reason for restraint   Every 2 hours: Monitor safety, psychosocial status, comfort, nutrition and hydration  5/29/2025 0850 by Valerie Cortez RN  Outcome: Progressing  Flowsheets  Taken 5/29/2025 0800 by Valerie Cortez RN  Remains free of injury from restraints (restraint for interference with medical device):   Determine that other, less restrictive measures have been tried or would not be effective before applying the restraint   Evaluate the patient's condition at the time of restraint application   Inform

## 2025-05-30 NOTE — PLAN OF CARE
Problem: Chronic Conditions and Co-morbidities  Goal: Patient's chronic conditions and co-morbidity symptoms are monitored and maintained or improved  Outcome: Progressing  Flowsheets (Taken 5/30/2025 1600)  Care Plan - Patient's Chronic Conditions and Co-Morbidity Symptoms are Monitored and Maintained or Improved: Monitor and assess patient's chronic conditions and comorbid symptoms for stability, deterioration, or improvement     Problem: Discharge Planning  Goal: Discharge to home or other facility with appropriate resources  Outcome: Progressing  Flowsheets (Taken 5/30/2025 1600)  Discharge to home or other facility with appropriate resources: Identify barriers to discharge with patient and caregiver     Problem: Safety - Adult  Goal: Free from fall injury  Outcome: Progressing     Problem: Pain  Goal: Verbalizes/displays adequate comfort level or baseline comfort level  Outcome: Progressing  Flowsheets (Taken 5/30/2025 1600)  Verbalizes/displays adequate comfort level or baseline comfort level: Assess pain using appropriate pain scale     Problem: Safety - Medical Restraint  Goal: Remains free of injury from restraints (Restraint for Interference with Medical Device)  Description: INTERVENTIONS:1. Determine that other, less restrictive measures have been tried or would not be effective before applying the restraint2. Evaluate the patient's condition at the time of restraint application3. Inform patient/family regarding the reason for restraint4. Q2H: Monitor safety, psychosocial status, comfort, nutrition and hydration  Outcome: Progressing  Flowsheets  Taken 5/30/2025 1600 by Jazmine Madsen  Remains free of injury from restraints (restraint for interference with medical device): Evaluate the patient's condition at the time of restraint application  Taken 5/30/2025 1400 by Deya Mendes RN  Remains free of injury from restraints (restraint for interference with medical device): Every 2 hours: Monitor  [place] high risk fall precautions, as indicated3. Provide frequent short contacts to provide reality reorientation, refocusing and direction4. Decrease environmental stimuli, including noise as appropriate5. Monitor and intervene to maintain adequate nutrition, hydration, elimination, sleep and activity6. If unable to ensure safety without constant attention obtain sitter and review sitter guidelines with assigned personnel7. Initiate Psychosocial CNS and Spiritual Care consult, as indicated  INTERVENTIONS:1. Assess for possible contributors to thought disturbance, including medications, impaired vision or hearing, underlying metabolic abnormalities, dehydration, psychiatric diagnoses, and notify attending LIP2. [place] high risk fall precautions, as indicated3. Provide frequent short contacts to provide reality reorientation, refocusing and direction4. Decrease environmental stimuli, including noise as appropriate5. Monitor and intervene to maintain adequate nutrition, hydration, elimination, sleep and activity6. If unable to ensure safety without constant attention obtain sitter and review sitter guidelines with assigned personnel7. Initiate Psychosocial CNS and Spiritual Care consult, as indicated  Outcome: Progressing     Problem: ABCDS Injury Assessment  Goal: Absence of physical injury  Outcome: Progressing

## 2025-05-30 NOTE — PROGRESS NOTES
Respiratory Wean Start time:  Order to wean ventilator Yes (if no, contact provider)    Patient placed on PS of 8, PEEP 5cmH2O, FIO2 35 at 0959AM.    HR 63bpm  RR 25bpm  SPO2 96%      Will continue to attempt to wean based on patient tolerance.       Performed by  Javan Marvin RCP RRT   05/30/25 0959   Patient Observation   Pulse 63   Respirations 25   SpO2 96 %   Vent Information   Vent Mode (S)  CPAP/PS  (trialed on PS)   Ventilator Settings   PEEP/CPAP (cmH2O) 5   FiO2  35 %   Pressure Support (cm H2O) 8 cm H2O   Vent Patient Data (Readings)   Vt (Measured) 227 mL   Peak Inspiratory Pressure (cmH2O) 14 cmH2O   Rate Measured 27 br/min   Minute Volume (L/min) 6.8 Liters   Mean Airway Pressure (cmH2O) 8 cmH20   Plateau Pressure (cm H2O) 28 cm H2O   Driving Pressure 23   I:E Ratio 1:2.20   Vent Alarm Settings   High Pressure (cmH2O) 45 cmH2O   Low Exhaled Vt (ml) 0 mL

## 2025-05-30 NOTE — PROGRESS NOTES
DAILY VENTILATOR WEANING ASSESSMENT PERFORMED    P/FIO2 Ratio =246          (<100= do not Wean)                  Cs =        21                  (<32= Instability)  Plat. Pressure = 28    Was SAT completed yes,     If yes proceed with the Spontaneous Breathing Trial (SBT) as long as none of the following exclusion criteria are met:     [] P/FIO2 <100  [] FiO2 >50%  [] Peep >10 CMH2O  [x] Patient on vasopressor  [] Failed SAT  [] SpO2 < 88%  [] Active MI  [] Lack of inspiratory effort     If any exclusion criteria are meet Do Not Proceed to SBT.    Was SBT completed yes    Ask Physician for a weaning plan yes    Instabilities:    Cardiovascular     CNS      [] Mean BP less than or equal to 75   [] Neuromuscular blockade  [] Heart Rate greater than 130   [] RASS of -3, -4, -5  [] Mechanical Assist Device    []  RASS of +3, +4         [] ICP > 15 or Intracranial Hypertension         Respiratory      Metabolic   [] Temp. (8hrs) < 95 or > 103  [] Respiratory Rate greater than 35   [] WBC < 5000 or > 99565  [] Minute Volume greater than 15L  [] pH less than 7.30  [] Deteriorating chest X-ray         Parameters    yes    NIF=  VC=  MV =  RSBI =      Additional Comments:     Performed by Javan Marvin RCP RRT

## 2025-05-30 NOTE — PROGRESS NOTES
End of Weaning attempt:     ending at 1248 and beginning at 0959.     HR 69bpm  RR 21bpm  SPO2 99%    Javan Marvin RCP RRT

## 2025-05-30 NOTE — PROGRESS NOTES
[facility] Hospitalist Progress Note    Admitting Date and Time: 5/19/2025 10:40 AM  Admit Dx: Failure to thrive in adult [R62.7]  FRANTZ (acute kidney injury) [N17.9]  Supratherapeutic INR [R79.1]  Anticoagulated on Coumadin [Z79.01]  Alcohol withdrawal syndrome without complication (HCC) [F10.640]    Synopsis: Patient presented to the emergency department with concerns about not being able to take care of himself.  Patient apparently been canceled doctors appointments and not taking medications.  Has a history of alcohol abuse and is a daily drinker.  Patient states not taking his medications and he ran out of them.  He is not going to doctors appointments because he cannot get a hold of the doctor.  Also reported some shortness of breath.  Denies any fever, chills, nausea, vomiting, chest pain, headache, dizziness, abdominal pain, hematuria.,  Dysuria or constipation or diarrhea.  Vital signs within normal limits and stable.  Although reviewing his vital signs throughout his ER course he did drop to 85% on room air.  He is currently 99% on 3 L nasal cannula.  Laboratory studies demonstrate BUN 22, creatinine 2.4, procalcitonin 0.19, troponin 74 with repeat of 67, proBNP 5566, alk phos 259, hemoglobin 8.3 and a WBC of 12.3.  INR is greater than 10.  CT imaging shows some increased third spacing of fluid.  Chest x-ray shows bibasilar opacities.  Patient was started on ceftriaxone and doxycycline for presumed pneumonia.  He was also noted to have seizure-like activity for which she was given Ativan.  He was also placed on CIWA protocol.  Patient be admitted to the ICU.  Patient continues to require pressors.  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    Subjective:  Patient is being followed for Failure to thrive in adult [R62.7]  FRANTZ (acute kidney injury) [N17.9]  Supratherapeutic INR [R79.1]  Anticoagulated on Coumadin [Z79.01]  Alcohol withdrawal syndrome  normocephalic and atraumatic  Eyes: pupils equal, round, and reactive to light, extraocular eye movements intact, conjunctivae normal  Neck: neck supple and non tender without mass   Pulmonary/Chest: clear to auscultation bilaterally- no wheezes, rales or rhonchi, on BiPAP  Cardiovascular: normal rate, normal S1 and S2 and no carotid bruits  Abdomen: soft, non-tender, non-distended, normal bowel sounds, no masses or organomegaly  Extremities: no cyanosis, no clubbing and no edema  Neurologic: Sedated        Recent Labs     05/28/25  0401 05/29/25  0404 05/30/25  0409    145 150*   K 4.0 4.0 4.2    108* 111*   CO2 24 22 22   BUN 48* 43* 46*   CREATININE 2.5* 2.2* 2.0*   GLUCOSE 121* 114* 152*   CALCIUM 9.5 9.3 9.5       Recent Labs     05/28/25  0401 05/29/25  0404 05/30/25  0409   WBC 11.2 13.6* 9.5   RBC 2.66* 2.89* 2.96*   HGB 7.0* 7.6* 7.8*   HCT 23.8* 25.9* 26.9*   MCV 89.5 89.6 90.9   MCH 26.3 26.3 26.4   MCHC 29.4* 29.3* 29.0*   RDW 23.6* 24.5* 25.0*   MPV Can not be calculated Can not be calculated Can not be calculated     Labs and imaging reviewed.    Assessment:    Acute hypoxic respiratory failure  Acute encephalopathy  Septic shock unclear etiology  Beta D glucan antigenemia  Supratherapeutic INR  Delirium tremens with alcohol withdrawal syndrome  New onset seizure  FRANTZ on CKD 3a  Hypotension, on midodrine  Liver cirrhosis  Acute on Chronic anemia  Chronic alcoholic  History of heart valve replacement on warfarin  History of AICD  Elevated QTc  Multiple falls  History of CVA  Hypernatremia       Plan:  Transferred out of the ICU on 5/22, transferred back to the ICU 5/23, intubated and sedated  Neurology consulted, on Keppra, high-dose thiamine, CIWA protocol, unable to have MRI, seizure precautions. EEG completed 5/23- moderate nonspecific encephalopathy, No seizures or epileptiform discharges were noted during this study.  Currently on Keppra  Nephrology following for FRANTZ on CKD, off bumex

## 2025-05-31 NOTE — PLAN OF CARE
Problem: Chronic Conditions and Co-morbidities  Goal: Patient's chronic conditions and co-morbidity symptoms are monitored and maintained or improved  Outcome: Progressing     Problem: Safety - Adult  Goal: Free from fall injury  Outcome: Progressing     Problem: Pain  Goal: Verbalizes/displays adequate comfort level or baseline comfort level  Outcome: Progressing     Problem: Safety - Medical Restraint  Goal: Remains free of injury from restraints (Restraint for Interference with Medical Device)  Description: INTERVENTIONS:1. Determine that other, less restrictive measures have been tried or would not be effective before applying the restraint2. Evaluate the patient's condition at the time of restraint application3. Inform patient/family regarding the reason for restraint4. Q2H: Monitor safety, psychosocial status, comfort, nutrition and hydration  Outcome: Progressing  Flowsheets  Taken 5/31/2025 0600 by Penny Ralph RN  Remains free of injury from restraints (restraint for interference with medical device):   Determine that other, less restrictive measures have been tried or would not be effective before applying the restraint   Evaluate the patient's condition at the time of restraint application   Inform patient/family regarding the reason for restraint   Every 2 hours: Monitor safety, psychosocial status, comfort, nutrition and hydration  Taken 5/31/2025 0400 by Penny Ralph RN  Remains free of injury from restraints (restraint for interference with medical device):   Determine that other, less restrictive measures have been tried or would not be effective before applying the restraint   Evaluate the patient's condition at the time of restraint application   Inform patient/family regarding the reason for restraint   Every 2 hours: Monitor safety, psychosocial status, comfort, nutrition and hydration  Taken 5/31/2025 0200 by Penny Ralph RN  Remains free of injury from restraints  Monitor and intervene to maintain adequate nutrition, hydration, elimination, sleep and activity6. If unable to ensure safety without constant attention obtain sitter and review sitter guidelines with assigned personnel7. Initiate Psychosocial CNS and Spiritual Care consult, as indicated  INTERVENTIONS:1. Assess for possible contributors to thought disturbance, including medications, impaired vision or hearing, underlying metabolic abnormalities, dehydration, psychiatric diagnoses, and notify attending LIP2. [place] high risk fall precautions, as indicated3. Provide frequent short contacts to provide reality reorientation, refocusing and direction4. Decrease environmental stimuli, including noise as appropriate5. Monitor and intervene to maintain adequate nutrition, hydration, elimination, sleep and activity6. If unable to ensure safety without constant attention obtain sitter and review sitter guidelines with assigned personnel7. Initiate Psychosocial CNS and Spiritual Care consult, as indicated  INTERVENTIONS:1. Assess for possible contributors to thought disturbance, including medications, impaired vision or hearing, underlying metabolic abnormalities, dehydration, psychiatric diagnoses, and notify attending LIP2. [place] high risk fall precautions, as indicated3. Provide frequent short contacts to provide reality reorientation, refocusing and direction4. Decrease environmental stimuli, including noise as appropriate5. Monitor and intervene to maintain adequate nutrition, hydration, elimination, sleep and activity6. If unable to ensure safety without constant attention obtain sitter and review sitter guidelines with assigned personnel7. Initiate Psychosocial CNS and Spiritual Care consult, as indicated  Outcome: Not Progressing

## 2025-05-31 NOTE — CONSULTS
NEUROLOGY CONSULT NOTE      Impression:  Witnessed seizure like activity  Embolic infarcts  Cardiomyopathy    Principal Problem:    Supratherapeutic INR  Active Problems:    Seizures (HCC)  Resolved Problems:    * No resolved hospital problems. *      Recommendations:                                            Continue Keppra but renal dose Add Vitamin B6 100 mg daily  Outpatient EEG  Plan of care and all questions and concerns of patient /family were discussed at the bedside.         Requesting Physician: Elsi Olson *    Reason for Consult:  Evaluate for Seizure like activity and history of strokes.     History of Present Illness:  Len Dorsey is a 59 y.o. male  with h/o ETOH abuse, testicular cancer and S/P Mitral and Aortic valvular replacement who was admitted to [facility] on 5/[facility] originally with presentation with seizure like activity and AMS. EEG was negative for epileptiform discharges and showed encephalopathy. Started on Keppra. Reconsulted for continuing mental status changes and combativeness. Intubated respiratory failure and sedated.      Denies headaches, vision problems, speech disturbance, weakness, numbness, bladder or bowel problems. Denies chest pain dizziness, light headedness.        Past Medical History:        Diagnosis Date    Alcoholic liver disease     H/O prosthetic aortic valve replacement 09/2020    [facility] - [facility]  Mitral and pacemaker at this time as well    History of mitral valve replacement 09/2020    at [facility] - main  Aortic Valve placed at this time as well as pacemaker    Stroke (HCC)     Testicular cancer (HCC)     in remission since 1988           Procedure Laterality Date    COLONOSCOPY N/A 8/24/2022    COLONOSCOPY POLYPECTOMY HOT BIOPSY performed by Rene Lorenzo [place] [facility]    FRACTURE SURGERY      Left Tibial Plateau Fracture 3/29/2022     PACEMAKER PLACEMENT  09/2020    UPPER GASTROINTESTINAL ENDOSCOPY N/A 8/24/2022    EGD

## 2025-05-31 NOTE — PROGRESS NOTES
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     Warfarin has been discontinued   Clinical Pharmacy to sign-off  Physician to re-consult pharmacy if future dosing is needed    Thank you for the consult,    Latrell Vidal, Suraj, BCPS, BCCCP 5/31/2025 8:49 AM  Phone: [phone]

## 2025-05-31 NOTE — PROGRESS NOTES
[facility]  [facility]  Progress Note    SUBJECTIVE:  Chief Complaint   Patient presents with    Failure To Thrive     Patient not caring for self - canceling doctors appointments and not taking medications. Daily drinker. C/O SOB.    CC respiratory failure, leukocytosis, fever    Patient resting in bed.  Remains critically ill, intubated on mechanical ventilation.  Low-grade fever overnight      Review of systems:  As stated above in the chief complaint, otherwise negative.    Medications:  Scheduled Meds:   hydrocortisone sodium succinate PF (SOLU-CORTEF) 50 mg in sterile water 2 mL injection  50 mg IntraVENous Q12H    micafungin  100 mg IntraVENous Daily    metoclopramide  5 mg IntraVENous Q6H    midodrine  20 mg Oral TID WC    levETIRAcetam  250 mg IntraVENous BID    vitamin B-6  50 mg Oral Daily    white petrolatum   Topical BID    acetylcysteine  600 mg Inhalation BID RT    ipratropium 0.5 mg-albuterol 2.5 mg  1 Dose Inhalation Q4H WA RT    budesonide  0.5 mg Nebulization BID RT    arformoterol tartrate  15 mcg Nebulization BID RT    [Held by provider] bumetanide  1 mg Oral Daily    chlorhexidine  15 mL Mouth/Throat BID    thiamine  100 mg IntraVENous Daily    lactulose  20 g Oral TID    atorvastatin  20 mg Oral Nightly    rifAXIMin  400 mg Oral TID    busPIRone  5 mg Oral BID    sodium chloride flush  5-40 mL IntraVENous 2 times per day    multivitamin  1 tablet Oral Daily    folic acid  1 mg Oral Daily    pantoprazole  40 mg IntraVENous BID     Continuous Infusions:   sodium chloride      dexmedeTOMIDine (PRECEDEX) 1,000 mcg in sodium chloride 0.9 % 250 mL infusion 0.2 mcg/kg/hr (05/31/25 0324)    norepinephrine Stopped (05/30/25 1946)    [Held by provider] bumetanide (BUMEX) 12.5 mg in sodium chloride 0.9 % 125 mL infusion Stopped (05/27/25 1143)    sodium chloride Stopped (05/22/25 2105)    dextrose       PRN Meds:sodium chloride, midazolam, white petrolatum, sodium chloride  flush, sodium chloride, acetaminophen **OR** acetaminophen, melatonin, ondansetron **OR** ondansetron, polyethylene glycol, glucose, dextrose bolus **OR** dextrose bolus, glucagon (rDNA), dextrose    OBJECTIVE:  BP (!) 112/53   Pulse 68   Temp 98.8 °F (37.1 °C) (Bladder)   Resp 18   Ht 1.778 m (5' 10\")   Wt 122 kg (268 lb 15.4 oz)   SpO2 95%   BMI 38.59 kg/m²   Temp  Av.7 °F (37.1 °C)  Min: 97.7 °F (36.5 °C)  Max: 99.5 °F (37.5 °C)    Constitutional: No acute distress  Skin: Warm and dry. No rashes were noted.   Neuro: Reactive to stimuli  HEENT: Round and reactive pupils.  Moist mucous membranes.  No ulcerations or thrush.  Chest: .Respirations unlabored. Breath sounds clear.   Cardiovascular:  RRR-pacer rhythm  Abdomen: Soft. Bowel sounds present.  NG tube low intermittent suction.  FMS liquid brown stool  Extremities: 1+ lower extremity edema    Lines: AL right radial 2025, CVC right IJ 2025      Laboratory and Tests:  Lab Results   Component Value Date    WBC 9.4 2025    WBC 9.5 2025    WBC 13.6 (H) 2025    HGB 7.3 (L) 2025    HCT 25.1 (L) 2025    MCV 90.7 2025     (L) 2025     Lab Results   Component Value Date    CRP 37.0 (H) 2025    CRP 0.5 (H) 2023     Lab Results   Component Value Date    SEDRATE 30 (H) 2025    SEDRATE 10 2023   Microbiology  Blood cultures 2025 no growth 3 days  Respiratory culture 2025 Candida albicans, Mónica dubliniensus    Radiology:  CXR 2025  IMPRESSION:  Cardiomegaly with mild interstitial edema slightly improved in the interval.  Small bilateral pleural effusions.  No new focal parenchymal opacification  present.    MPRESSION:      Resp failure - intubated - sputum cx - Candida colonization   Encephalopathy / alcoholic liver disease   Hx of mitral and aortic valve replacement , pacer insertion   Leukocytosis - now on hydrocortisone-normalized  Beta D glucan antigenemia /

## 2025-05-31 NOTE — PROGRESS NOTES
Case reviewed and personally discussed with MICU team.    59y/M w/ history of cirrhosis and mechanical valve who presents with GI bleeding secondary to coagulopathy and supratherapeutic INR.    He had an admission earlier this year with similar symptoms.    PLAN:  - Continue to reverse INR to therapeutic range.   - If continued blood loss, will need complete reversal with closely dosed heparin gtt.  - EGD in February with no high risk portal hypertensive changes. He has portal hypertensive gastropathy which is certainly bleeding in the setting of supratherapeutic INR.  - PPI BID.  - No plans for repeat endoscopic evaluation.   - Will need improved plans for monitoring INR on outpatient basis.     Full consult to follow.

## 2025-05-31 NOTE — PROGRESS NOTES
Patient tried on PSV. Patient started to have increased RR in the high 30s and low tidal volumes. Patient placed back to AC mode at this time.

## 2025-05-31 NOTE — PROGRESS NOTES
[facility] Hospitalist Progress Note    Admitting Date and Time: 5/19/2025 10:40 AM  Admit Dx: Failure to thrive in adult [R62.7]  FRANTZ (acute kidney injury) [N17.9]  Supratherapeutic INR [R79.1]  Anticoagulated on Coumadin [Z79.01]  Alcohol withdrawal syndrome without complication (HCC) [F10.953]    Synopsis: Patient presented to the emergency department with concerns about not being able to take care of himself.  Patient apparently been canceled doctors appointments and not taking medications.  Has a history of alcohol abuse and is a daily drinker.  Patient states not taking his medications and he ran out of them.  He is not going to doctors appointments because he cannot get a hold of the doctor.  Also reported some shortness of breath.  Denies any fever, chills, nausea, vomiting, chest pain, headache, dizziness, abdominal pain, hematuria.,  Dysuria or constipation or diarrhea.  Vital signs within normal limits and stable.  Although reviewing his vital signs throughout his ER course he did drop to 85% on room air.  He is currently 99% on 3 L nasal cannula.  Laboratory studies demonstrate BUN 22, creatinine 2.4, procalcitonin 0.19, troponin 74 with repeat of 67, proBNP 5566, alk phos 259, hemoglobin 8.3 and a WBC of 12.3.  INR is greater than 10.  CT imaging shows some increased third spacing of fluid.  Chest x-ray shows bibasilar opacities.  Patient was started on ceftriaxone and doxycycline for presumed pneumonia.  He was also noted to have seizure-like activity for which she was given Ativan.  He was also placed on CIWA protocol.  Patient be admitted to the ICU.  Patient continues to require pressors.  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      Subjective:  Patient is being followed for Failure to thrive in adult [R62.7]  FRANTZ (acute kidney injury) [N17.9]  Supratherapeutic INR [R79.1]  Anticoagulated on Coumadin [Z79.01]  Alcohol withdrawal  syndrome without complication (HCC) [F10.930]     Patient seen and examined.   Events reviewed.   Intubated awake and intermittently following commands,  On Levophed weaning off  Sedated on precedex   Had bleeding overnight through NG tube, continues to have dark bloody output per NG      hydrocortisone sodium succinate PF (SOLU-CORTEF) 50 mg in sterile water 2 mL injection  50 mg IntraVENous Q12H    micafungin  100 mg IntraVENous Daily    metoclopramide  5 mg IntraVENous Q6H    midodrine  20 mg Oral TID WC    levETIRAcetam  250 mg IntraVENous BID    vitamin B-6  50 mg Oral Daily    white petrolatum   Topical BID    acetylcysteine  600 mg Inhalation BID RT    ipratropium 0.5 mg-albuterol 2.5 mg  1 Dose Inhalation Q4H WA RT    budesonide  0.5 mg Nebulization BID RT    arformoterol tartrate  15 mcg Nebulization BID RT    [Held by provider] bumetanide  1 mg Oral Daily    chlorhexidine  15 mL Mouth/Throat BID    thiamine  100 mg IntraVENous Daily    lactulose  20 g Oral TID    atorvastatin  20 mg Oral Nightly    rifAXIMin  400 mg Oral TID    busPIRone  5 mg Oral BID    sodium chloride flush  5-40 mL IntraVENous 2 times per day    multivitamin  1 tablet Oral Daily    folic acid  1 mg Oral Daily    pantoprazole  40 mg IntraVENous BID     sodium chloride, , PRN  midazolam, 2 mg, Q4H PRN  white petrolatum, , BID PRN  sodium chloride flush, 5-40 mL, PRN  sodium chloride, , PRN  acetaminophen, 650 mg, Q6H PRN   Or  acetaminophen, 650 mg, Q6H PRN  melatonin, 3 mg, Nightly PRN  ondansetron, 4 mg, Q8H PRN   Or  ondansetron, 4 mg, Q6H PRN  polyethylene glycol, 17 g, Daily PRN  glucose, 4 tablet, PRN  dextrose bolus, 125 mL, PRN   Or  dextrose bolus, 250 mL, PRN  glucagon (rDNA), 1 mg, PRN  dextrose, , Continuous PRN         Objective:    BP (!) 112/53   Pulse 68   Temp 98.8 °F (37.1 °C) (Bladder)   Resp 18   Ht 1.778 m (5' 10\")   Wt 122 kg (268 lb 15.4 oz)   SpO2 95%   BMI 38.59 kg/m²     General Appearance: Intubated,  seizure precautions. EEG completed 5/23- moderate nonspecific encephalopathy, No seizures or epileptiform discharges were noted during this study.  Currently on [facility]  Nephrology following for FRANTZ on CKD, off bumex   Currently on levophed, started on midodrine  Status post vitamin K, monitoring PT/INR, pharmacy dosing warfarin. - held due to GI bleeding   GI consulted, serial H&H   On lactulose, rifaximin   N.p.o., TF  On D5 infusion   ID following, off aztreonam currently on micafungin   Monitor LFT's, right upper quadrant ultrasound with cholelithiasis, no signs of cholecystitis, steatosis.  Continue Lactulose and Rifaximin.     Palliative care consult for goals of care    Code Status: Full Code  DVT/GI Prophylaxis: Warfarin     Dispo: Pending clinical course.      NOTE: This report was transcribed using voice recognition software. Every effort was made to ensure accuracy; however, inadvertent computerized transcription errors may be present.  Electronically signed by Elsi Weber MD on 5/31/2025 at 12:09 PM

## 2025-05-31 NOTE — PROGRESS NOTES
DAILY VENTILATOR WEANING ASSESSMENT PERFORMED    P/FIO2 Ratio = 2.38         (<100= do not Wean)                  Cs =  25                        (<32= Instability)  Plat. Pressure = 24    Was SAT completed no,     If yes proceed with the Spontaneous Breathing Trial (SBT) as long as none of the following exclusion criteria are met:     [] P/FIO2 <100  [] FiO2 >50%  [] Peep >10 CMH2O  [] Patient on vasopressor  [] Failed SAT  [] SpO2 < 88%  [] Active MI  [x] Lack of inspiratory effort     If any exclusion criteria are meet Do Not Proceed to SBT.    Was SBT completed no    Ask Physician for a weaning plan no    Instabilities:    Cardiovascular     CNS      [x] Mean BP less than or equal to 75   [] Neuromuscular blockade  [] Heart Rate greater than 130   [] RASS of -3, -4, -5  [x] Mechanical Assist Device    []  RASS of +3, +4         [] ICP > 15 or Intracranial Hypertension         Respiratory      Metabolic   [] Temp. (8hrs) < 95 or > 103  [] Respiratory Rate greater than 35   [] WBC < 5000 or > 03233  [] Minute Volume greater than 15L  [] pH less than 7.30  [] Deteriorating chest X-ray         Parameters    no    NIF=  VC=  MV =  RSBI =      Additional Comments:     Performed by Lin Zhao RCP RRT

## 2025-05-31 NOTE — PROGRESS NOTES
Attempted to release bilateral soft wrist restraints when patient attempts to pull out ETT despite 1:1 and less restrictive measures used.  Bilateral soft wrist restraints reapplied.

## 2025-05-31 NOTE — PROGRESS NOTES
[facility]  Department of Internal Medicine   Internal Medicine Residency   MICU Progress Note    Patient:  Len Dorsey 59 y.o. male  MRN: [id number]     Date of Service: 5/31/2025    Allergy: Patient has no known allergies.    Subjective     The patient was seen and examined at bedside.  Patient is still intubated, sedated on Precedex.  Patient does follow commands today.  Patient did not have enough tidal volume and was tachycardic on pressure support today.  Patient is off the pressor now.  Patient had some NG tube bloody output overnight, GI consulted, hemoglobin stable.    Objective     VS: BP (!) 112/53   Pulse 70   Temp 98.8 °F (37.1 °C) (Bladder)   Resp 21   Ht 1.778 m (5' 10\")   Wt 122 kg (268 lb 15.4 oz)   SpO2 96%   BMI 38.59 kg/m²   ABP (Arterial line BP): 114/55  ABP Mean (Arterial Line Mean): 72 mmHg    I & O - 24hr:   Intake/Output Summary (Last 24 hours) at 5/31/2025 1307  Last data filed at 5/31/2025 0900  Gross per 24 hour   Intake 1431.76 ml   Output 1515 ml   Net -83.24 ml       Physical Exam:  General Appearance: intubated, sedated  Neck: no adenopathy, no carotid bruit, supple, symmetrical, trachea midline, and thyroid not enlarged, symmetric, no tenderness/mass/nodules  Lung: rales bilaterally, diminished breath sounds on the left  Heart: normal rate with irregular rhythm  Abdomen: distended, firm, no guarding  Extremities:   2+ pitting edema up to the thighs  Musculoskeletal: No joint swelling, no muscle tenderness. ROM normal in all joints of extremities.   Neurologic: Mental status: opens eyes to voice, follows commands    Lines     site day    Art line   R Radial 5/27/2025   TLC R IJ 5/28/2025   PICC None    Hemoaccess None      Mechanical Ventilation:  Mode: A/C   TV: 450 ml RR: 18  PEEP: 5 cmH2O FiO2: 40    ABG:     Lab Results   Component Value Date/Time    PH 7.456 05/31/2025 09:20 AM    PCO2 36.2 05/31/2025 09:20 AM    PO2 83.4 05/31/2025 09:20 AM    HCO3  full, may have been transcribed using voice recognition software. Every effort was made to ensure accuracy; however, inadvertent computerized transcription errors may be present. Please excuse any transcriptional grammatical or spelling errors that may have escaped my editorial review.     Total critical care time caring for this patient with life threatening, unstable organ failure, including direct patient contact, management of life support systems, review of data including imaging and labs, discussions with other team members and physicians is at least 40 Min so far today, excluding procedures.  Sanjuana Gomez MD

## 2025-05-31 NOTE — PROGRESS NOTES
Department of Internal Medicine  Nephrology Attending Consult Note    Events reviewed    SUBJECTIVE: We are following Mr. Dorsey for FRANTZ on CKD.  Presently intubated    PHYSICAL EXAM:      Vitals:    VITALS:  BP (!) 112/53   Pulse 71   Temp 99.5 °F (37.5 °C) (Esophageal)   Resp 29   Ht 1.778 m (5' 10\")   Wt 122 kg (268 lb 15.4 oz)   SpO2 95%   BMI 38.59 kg/m²   24HR INTAKE/OUTPUT:    Intake/Output Summary (Last 24 hours) at 5/31/2025 0737  Last data filed at 5/31/2025 0600  Gross per 24 hour   Intake 1431.76 ml   Output 1895 ml   Net -463.24 ml       Constitutional: Patient is presently intubated, sedated  HEENT: Pupils equal reactive, endotracheal tube in place  Respiratory: Decreased breath sounds at the bases  Cardiovascular/Edema: Heart sounds are regular  Gastrointestinal: Abdomen soft  Neurologic: Unable to level  Skin: No lesion  Other: + Edema    Scheduled Meds:   micafungin  100 mg IntraVENous Daily    metoclopramide  5 mg IntraVENous Q6H    midodrine  20 mg Oral TID WC    levETIRAcetam  250 mg IntraVENous BID    vitamin B-6  50 mg Oral Daily    white petrolatum   Topical BID    hydrocortisone sodium succinate PF (SOLU-CORTEF) 100 mg in sterile water 2 mL injection  100 mg IntraVENous Q8H    acetylcysteine  600 mg Inhalation BID RT    ipratropium 0.5 mg-albuterol 2.5 mg  1 Dose Inhalation Q4H WA RT    budesonide  0.5 mg Nebulization BID RT    arformoterol tartrate  15 mcg Nebulization BID RT    [Held by provider] bumetanide  1 mg Oral Daily    chlorhexidine  15 mL Mouth/Throat BID    thiamine  100 mg IntraVENous Daily    lactulose  20 g Oral TID    atorvastatin  20 mg Oral Nightly    rifAXIMin  400 mg Oral TID    busPIRone  5 mg Oral BID    sodium chloride flush  5-40 mL IntraVENous 2 times per day    multivitamin  1 tablet Oral Daily    folic acid  1 mg Oral Daily    pantoprazole  40 mg IntraVENous BID     Continuous Infusions:   sodium chloride      dexmedeTOMIDine (PRECEDEX) 1,000 mcg in  developed seizure activity and therefore he was admitted to the MICU.  His initial creatinine level was 2.4 mg/dl, and has increased to 2.9 mg/dL, reason for this consultation.    Problems resolved:    Hyperchloremic metabolic acidosis, probably due to GI losses  Hypernatremia, with hypervolemia, needs natriuresis, sodium levels  stable    IMPRESSION/RECOMMENDATIONS:      FRANTZ on CKD, hemodynamically mediated due to heart failure; resolved, renal function stable last 24 hours with adequate urine output >1.6 L    CKD stage IIIa, probably due to nephrosclerosis, baseline creatinine 1.9-2.1 mg/dL    Recurrent hypernatremia with hypovolemia, to replace free water    HFpEF 50%, proBNP 5566> 7050>6575, with anasarca and ascites    Hypotension, on norepinephrine and midodrine  Respiratory alkalosis  Normocytic anemia, with iron deficiency, ferritin 64, iron saturation 10%, folate 10.2, B12 >2000  --------------------------------------  Respiratory failure status post intubation  New onset seizures, on Depakote  Probably pneumonia, on ceftriaxone and doxycycline  Cirrhosis, 2/2 Alcohol abuse, on lactulose, rifaximin  CAD status post CABG, AVR and MVR mechanical valves  HLD, on atorvastatin   History of testicular cancer  History of CVA  Supratherapeutic INR  Nutrition, n.p.o.     Plan:     Continue midodrine 5 mg 3 times daily  Continue to monitor renal function  Continue monitor sodium level  Repeat proBNP tomorrow       Omar Joiner MD

## 2025-06-01 PROBLEM — R41.82 ALTERED MENTAL STATUS: Status: ACTIVE | Noted: 2025-06-01

## 2025-06-01 NOTE — PROGRESS NOTES
[facility]                                                                       Query Response Note      PATIENT:               DANCER, THOMAS  ACCT #:                  [id number]  MRN:                     [id number]  :                      1999  ADMIT DATE:       6/10/2022 6:02 PM  DISCH DATE:        2022 6:04 PM  RESPONDING  PROVIDER #:        [id number]           QUERY TEXT:    Sepsis/SIRS: Source cellulitis is documented in the patient admitted less than 48 hours and also diagnosed with dehydration and toxic metabolic encephalopathy secondary to methamphetamine. Due to a conflicting clinical picture, please confirm/clarify the appropriate diagnosis for this admission.    Sepsis - real or suspected infection plus 2 or more SIRS criteria  Temp <96.8 or >101  HR >90  RR >20  WBC <4,000 or > 12,000  >10% bandemia         The patient's Clinical Indicators include:  Sepsis/SIRS: Left leg Cellulitis  Afebrile; HR - 117 - 60s; WBC: 19.4 - 8.2  Dehydration   UDS: + Methamphetamine  Blood culture negative  NS Bouls  cefazolin IV/ Keflex PO   Methamphetamine abuse     Thank you,  Su Nur RN, CCS  Clinical Documentation Integrity  Connect via Voalte  Options provided:   -- Sepsis is valid   -- Sepsis ruled out; Cellulitis ONLY   -- SIRS due to cellulitis; without sepsis   -- Other, (Pls specify)   -- Unable to determine      Query created by: Su Nur on 2022 1:59 PM    RESPONSE TEXT:    Sepsis is valid          Electronically signed by:  DANN CHURCHILL MD 2022 4:33 PM               Department of Internal Medicine  Nephrology Attending Consult Note    Events reviewed    SUBJECTIVE: We are following Mr. Dorsey for FRANTZ on CKD.  Presently intubated    PHYSICAL EXAM:      Vitals:    VITALS:  /61   Pulse 71   Temp 99.1 °F (37.3 °C) (Bladder)   Resp 18   Ht 1.778 m (5' 10\")   Wt 122 kg (268 lb 15.4 oz)   SpO2 97%   BMI 38.59 kg/m²   24HR INTAKE/OUTPUT:    Intake/Output Summary (Last 24 hours) at 6/1/2025 1043  Last data filed at 6/1/2025 1000  Gross per 24 hour   Intake 1483.25 ml   Output 1757 ml   Net -273.75 ml       Constitutional: Patient is presently intubated, sedated  HEENT: Pupils equal reactive, endotracheal tube in place  Respiratory: Decreased breath sounds at the bases  Cardiovascular/Edema: Heart sounds are regular  Gastrointestinal: Abdomen soft  Neurologic: Unable to level  Skin: No lesion  Other: + Edema    Scheduled Meds:   hydrocortisone sodium succinate PF (SOLU-CORTEF) 50 mg in sterile water 2 mL injection  50 mg IntraVENous Q12H    folic acid  1 mg IntraVENous Daily    micafungin  100 mg IntraVENous Daily    [Held by provider] metoclopramide  5 mg IntraVENous Q6H    [Held by provider] midodrine  20 mg Oral TID WC    levETIRAcetam  250 mg IntraVENous BID    vitamin B-6  50 mg Oral Daily    white petrolatum   Topical BID    acetylcysteine  600 mg Inhalation BID RT    ipratropium 0.5 mg-albuterol 2.5 mg  1 Dose Inhalation Q4H WA RT    budesonide  0.5 mg Nebulization BID RT    arformoterol tartrate  15 mcg Nebulization BID RT    [Held by provider] bumetanide  1 mg Oral Daily    chlorhexidine  15 mL Mouth/Throat BID    thiamine  100 mg IntraVENous Daily    [Held by provider] lactulose  20 g Oral TID    atorvastatin  20 mg Oral Nightly    [Held by provider] rifAXIMin  400 mg Oral TID    [Held by provider] busPIRone  5 mg Oral BID    sodium chloride flush  5-40 mL IntraVENous 2 times per day    multivitamin  1 tablet Oral Daily    [Held by provider] folic acid  1 mg  head showed no acute abnormalities, CT abdomen pelvis showed mild colonic ileus, prominent subcutaneous edema is worsening, moderate ascites he is well was found to have a supratherapeutic INR >10, his ABGs show a pH of 7.244 with PO2 of 57, bicarbonate level of 16.3, while in the ER he developed seizure activity and therefore he was admitted to the MICU.  His initial creatinine level was 2.4 mg/dl, and has increased to 2.9 mg/dL, reason for this consultation.    Problems resolved:    Hyperchloremic metabolic acidosis, probably due to GI losses  Hypernatremia, with hypervolemia, needs natriuresis, sodium levels  stable    IMPRESSION/RECOMMENDATIONS:      FRANTZ on CKD, hemodynamically mediated due to heart failure; resolved, renal function stable last 24 hours with adequate urine output >1.4 L    CKD stage IIIa, probably due to nephrosclerosis, baseline creatinine 1.9-2.1 mg/dL    Recurrent hypernatremia with hypovolemia, to replace free water    HFpEF 50%, proBNP 5566> 7050>6575, with anasarca and ascites    Hypotension, on norepinephrine and midodrine  Respiratory alkalosis  Normocytic anemia, with iron deficiency, ferritin 64, iron saturation 10%, folate 10.2, B12 >2000  --------------------------------------  Respiratory failure status post intubation  New onset seizures, on Depakote  Probably pneumonia, on ceftriaxone and doxycycline  Cirrhosis, 2/2 Alcohol abuse, on lactulose, rifaximin  CAD status post CABG, AVR and MVR mechanical valves  HLD, on atorvastatin   History of testicular cancer  History of CVA  Supratherapeutic INR  Nutrition, n.p.o.     Plan:     D5W at 75 cc/hour  Continue midodrine 5 mg 3 times daily  Continue to monitor renal function  Continue monitor sodium level         Omar Joiner MD

## 2025-06-01 NOTE — PLAN OF CARE
Problem: Respiratory - Adult  Goal: Achieves optimal ventilation and oxygenation  6/1/2025 1345 by Lin Zhao RCP  Outcome: Not Progressing

## 2025-06-01 NOTE — PLAN OF CARE
Patient is bilateral Wrist Restraints. When Released Pulls at lines and Tubes.  Problem: Nutrition Deficit:  Goal: Optimize nutritional status  5/31/2025 1050 by Valery Kwong RN  Outcome: Not Progressing     Problem: Respiratory - Adult  Goal: Achieves optimal ventilation and oxygenation  5/31/2025 2334 by Penny Ralph RN  Outcome: Not Progressing  5/31/2025 1050 by Valery Kwong RN  Outcome: Progressing  5/31/2025 1050 by Lin Zhao RCP  Outcome: Progressing     Problem: Chronic Conditions and Co-morbidities  Goal: Patient's chronic conditions and co-morbidity symptoms are monitored and maintained or improved  5/31/2025 2334 by Penny Ralph RN  Outcome: Progressing  5/31/2025 1050 by Valery Kwong RN  Outcome: Progressing     Problem: Safety - Adult  Goal: Free from fall injury  5/31/2025 2334 by Penny Ralph RN  Outcome: Progressing  5/31/2025 1050 by Valery Kwong RN  Outcome: Progressing     Problem: Pain  Goal: Verbalizes/displays adequate comfort level or baseline comfort level  5/31/2025 2334 by Penny Ralph RN  Outcome: Progressing  5/31/2025 1050 by Valery Kwong RN  Outcome: Progressing     Problem: Safety - Medical Restraint  Goal: Remains free of injury from restraints (Restraint for Interference with Medical Device)  Description: INTERVENTIONS:1. Determine that other, less restrictive measures have been tried or would not be effective before applying the restraint2. Evaluate the patient's condition at the time of restraint application3. Inform patient/family regarding the reason for restraint4. Q2H: Monitor safety, psychosocial status, comfort, nutrition and hydration  5/31/2025 2334 by Penny Ralph RN  Outcome: Progressing  Flowsheets  Taken 5/31/2025 2200 by Penny Ralph RN  Remains free of injury from restraints (restraint for interference with medical device):   Determine that other, less restrictive measures have been  (restraint for interference with medical device): Determine that other, less restrictive measures have been tried or would not be effective before applying the restraint  Taken 5/31/2025 0848 by Valery Kwong RN  Remains free of injury from restraints (restraint for interference with medical device): Determine that other, less restrictive measures have been tried or would not be effective before applying the restraint  Taken 5/31/2025 0800 by Valery Kwong RN  Remains free of injury from restraints (restraint for interference with medical device): Determine that other, less restrictive measures have been tried or would not be effective before applying the restraint  Taken 5/31/2025 0600 by Penny Ralph RN  Remains free of injury from restraints (restraint for interference with medical device):   Determine that other, less restrictive measures have been tried or would not be effective before applying the restraint   Evaluate the patient's condition at the time of restraint application   Inform patient/family regarding the reason for restraint   Every 2 hours: Monitor safety, psychosocial status, comfort, nutrition and hydration  Taken 5/31/2025 0400 by Penny Ralph RN  Remains free of injury from restraints (restraint for interference with medical device):   Determine that other, less restrictive measures have been tried or would not be effective before applying the restraint   Evaluate the patient's condition at the time of restraint application   Inform patient/family regarding the reason for restraint   Every 2 hours: Monitor safety, psychosocial status, comfort, nutrition and hydration  Taken 5/31/2025 0200 by Penny Ralph RN  Remains free of injury from restraints (restraint for interference with medical device):   Determine that other, less restrictive measures have been tried or would not be effective before applying the restraint   Evaluate the patient's condition at the  including medications, impaired vision or hearing, underlying metabolic abnormalities, dehydration, psychiatric diagnoses, and notify attending LIP2. [place] high risk fall precautions, as indicated3. Provide frequent short contacts to provide reality reorientation, refocusing and direction4. Decrease environmental stimuli, including noise as appropriate5. Monitor and intervene to maintain adequate nutrition, hydration, elimination, sleep and activity6. If unable to ensure safety without constant attention obtain sitter and review sitter guidelines with assigned personnel7. Initiate Psychosocial CNS and Spiritual Care consult, as indicated  5/31/2025 2334 by Penny Ralph, RN  Outcome: Progressing  5/31/2025 1050 by Valery Kwong, RN  Outcome: Not Progressing

## 2025-06-01 NOTE — PROGRESS NOTES
Headband: removed  Date/Time: 6/1/25 1530    Recorder: recording stopped  Skin:  intact  Highest Seizure [place] Percentage past hour: 0        General info regarding Seizure [place] %:  Minimum duration of study is 2 hours. If Seizure [place] has remained 0% throughout the entire 2-hour duration, communicate with provider to stop the recording.     Seizure [place] 0-10% - Continue to monitor and complete 2-hour study.  Seizure [place] 11-89% - Epileptiform activity present. Notify provider for next steps.  Seizure [place] >/= 90% - Epileptiform activity consistent with Status Epilepticus. Immediately notify provider.     *Patients with Seizure [place] above 10% that persists may require a study longer than 2 hours. Maximum recording duration is 24 hours. Please update provider with a persistent increase in Seizure [place] above 10%.

## 2025-06-01 NOTE — PROGRESS NOTES
Headband: applied  Date/Time: 6/1/25 1331    Recorder: recording started  Skin:  intact  Highest Seizure [place] Percentage past hour: 0        General info regarding Seizure [place] %:  Minimum duration of study is 2 hours. If Seizure [place] has remained 0% throughout the entire 2-hour duration, communicate with provider to stop the recording.     Seizure [place] 0-10% - Continue to monitor and complete 2-hour study.  Seizure [place] 11-89% - Epileptiform activity present. Notify provider for next steps.  Seizure [place] >/= 90% - Epileptiform activity consistent with Status Epilepticus. Immediately notify provider.     *Patients with Seizure [place] above 10% that persists may require a study longer than 2 hours. Maximum recording duration is 24 hours. Please update provider with a persistent increase in Seizure [place] above 10%.

## 2025-06-01 NOTE — PROGRESS NOTES
Neuro Inpatient Follow Up Note       Len Dorsey is a 59 y.o.  male     Neuro is following for witnessed seizure-like activity and embolic strokes    PMH: Stroke, history of testicular cancer, mitral valve replacement, aortic valve replacement, alcoholic liver disease    Patient initially presented to ED on 5/19 for failure to thrive.  He lives at home with his fiancée where he has a history of significant alcohol use although he has not had a drink in 1 month.  Patient reported he had not been taking his meds because he ran out of them and has not been going to his doctors appointments.  Initially neurology saw him 05/20 for AMS and possible seizure.  EEG on 5/23 showed moderate nonspecific encephalopathy without seizure with subsequent neuro signed off on 5/22 with plans for EEG as outpatient.    We were consulted again on 5/29 for reevaluation for continued AMS.    Vital signs have been stable on the vent    ROS is limited given intubation and patient condition.      There is no family present at bedside currently    Objective:     /61   Pulse 71   Temp 99.1 °F (37.3 °C) (Bladder)   Resp 18   Ht 1.778 m (5' 10\")   Wt 122 kg (268 lb 15.4 oz)   SpO2 97%   BMI 38.59 kg/m²     General appearance: Intubated, sedated, no obvious distress  Head: normocephalic, without obvious abnormality, atraumatic  Eyes: conjunctivae/corneas clear. .  Neck: supple, symmetrical, trachea midline  Lungs: Unlabored breaths on vent  Heart: regular rate and rhythm  Extremities: normal, atraumatic, no cyanosis or edema  Pulses: 2+ and symmetric  Skin: color, texture, turgor normal---no rashes or lesions      Mental Status: Intubated, sedated, no tracking, no commands    Speech/Language: Intubated    Cranial Nerves:  I: smell NA   II: visual acuity  NA   II: visual fields Full to confrontation   II: pupils CEFERINO 5 mm brisk   III,VII: ptosis None   III,IV,VI: extraocular muscles  Pupils midline   V: mastication    V: facial

## 2025-06-01 NOTE — PROGRESS NOTES
[facility]  Department of Internal Medicine   Internal Medicine Residency   MICU Progress Note    Patient:  Len Dorsey 59 y.o. male  MRN: [id number]     Date of Service: 6/1/2025    Allergy: Patient has no known allergies.    Subjective     The patient was seen and examined at bedside.  Patient is still intubated, sedated on Precedex.  Patient will most likely need trach and PEG, general surgery consulted.    Objective     VS: /61   Pulse 71   Temp 99.1 °F (37.3 °C) (Bladder)   Resp 18   Ht 1.778 m (5' 10\")   Wt 122 kg (268 lb 15.4 oz)   SpO2 97%   BMI 38.59 kg/m²   ABP (Arterial line BP): 107/52  ABP Mean (Arterial Line Mean): 69 mmHg    I & O - 24hr:   Intake/Output Summary (Last 24 hours) at 6/1/2025 1048  Last data filed at 6/1/2025 1000  Gross per 24 hour   Intake 1483.25 ml   Output 1757 ml   Net -273.75 ml       Physical Exam:  General Appearance: intubated, sedated  Neck: no adenopathy, no carotid bruit, supple, symmetrical, trachea midline, and thyroid not enlarged, symmetric, no tenderness/mass/nodules  Lung: rales bilaterally, diminished breath sounds on the left  Heart: normal rate with irregular rhythm  Abdomen: distended, firm, no guarding  Extremities:   2+ pitting edema up to the thighs  Musculoskeletal: No joint swelling, no muscle tenderness. ROM normal in all joints of extremities.   Neurologic: Mental status: opens eyes to voice, follows commands    Lines     site day    Art line   R Radial 5/27/2025   TLC R IJ 5/28/2025   PICC None    Hemoaccess None      Mechanical Ventilation:  Mode: A/C   TV: 450 ml RR: 18  PEEP: 5 cmH2O FiO2: 40    ABG:     Lab Results   Component Value Date/Time    PH 7.451 06/01/2025 05:57 AM    PCO2 33.0 06/01/2025 05:57 AM    PO2 99.0 06/01/2025 05:57 AM    HCO3 22.5 06/01/2025 05:57 AM    BE -1.1 06/01/2025 05:57 AM    THB 8.7 06/01/2025 05:57 AM    O2SAT 97.0 06/01/2025 05:57 AM        Medications     Infusions: (Fluid, Sedation,  This report, in part or full, may have been transcribed using voice recognition software. Every effort was made to ensure accuracy; however, inadvertent computerized transcription errors may be present. Please excuse any transcriptional grammatical or spelling errors that may have escaped my editorial review.     Total critical care time caring for this patient with life threatening, unstable organ failure, including direct patient contact, management of life support systems, review of data including imaging and labs, discussions with other team members and physicians is at least 38 Min so far today, excluding procedures.  Sanjuana Gomez MD

## 2025-06-01 NOTE — PROGRESS NOTES
DAILY VENTILATOR WEANING ASSESSMENT PERFORMED    P/FIO2 Ratio = 2.83         (<100= do not Wean)                  Cs =25                          (<32= Instability)  Plat. Pressure =23     Was SAT completed no,     If yes proceed with the Spontaneous Breathing Trial (SBT) as long as none of the following exclusion criteria are met:     [] P/FIO2 <100  [] FiO2 >50%  [] Peep >10 CMH2O  [] Patient on vasopressor  [] Failed SAT  [] SpO2 < 88%  [] Active MI  [x] Lack of inspiratory effort     If any exclusion criteria are meet Do Not Proceed to SBT.    Was SBT completed no    Ask Physician for a weaning plan no    Instabilities:    Cardiovascular     CNS      [x] Mean BP less than or equal to 75   [] Neuromuscular blockade  [] Heart Rate greater than 130   [] RASS of -3, -4, -5  [] Mechanical Assist Device    []  RASS of +3, +4         [] ICP > 15 or Intracranial Hypertension         Respiratory      Metabolic   [] Temp. (8hrs) < 95 or > 103  [] Respiratory Rate greater than 35   [] WBC < 5000 or > 88415  [] Minute Volume greater than 15L  [] pH less than 7.30  [] Deteriorating chest X-ray         Parameters    no    NIF=  VC=  MV =  RSBI =      Additional Comments:     Performed by Lin Zhao RCP RRT

## 2025-06-01 NOTE — PROGRESS NOTES
[facility] Hospitalist Progress Note    Admitting Date and Time: 5/19/2025 10:40 AM  Admit Dx: Failure to thrive in adult [R62.7]  FRANTZ (acute kidney injury) [N17.9]  Supratherapeutic INR [R79.1]  Anticoagulated on Coumadin [Z79.01]  Alcohol withdrawal syndrome without complication (HCC) [F10.960]    Synopsis: Patient presented to the emergency department with concerns about not being able to take care of himself.  Patient apparently been canceled doctors appointments and not taking medications.  Has a history of alcohol abuse and is a daily drinker.  Patient states not taking his medications and he ran out of them.  He is not going to doctors appointments because he cannot get a hold of the doctor.  Also reported some shortness of breath.  Denies any fever, chills, nausea, vomiting, chest pain, headache, dizziness, abdominal pain, hematuria.,  Dysuria or constipation or diarrhea.  Vital signs within normal limits and stable.  Although reviewing his vital signs throughout his ER course he did drop to 85% on room air.  He is currently 99% on 3 L nasal cannula.  Laboratory studies demonstrate BUN 22, creatinine 2.4, procalcitonin 0.19, troponin 74 with repeat of 67, proBNP 5566, alk phos 259, hemoglobin 8.3 and a WBC of 12.3.  INR is greater than 10.  CT imaging shows some increased third spacing of fluid.  Chest x-ray shows bibasilar opacities.  Patient was started on ceftriaxone and doxycycline for presumed pneumonia.  He was also noted to have seizure-like activity for which she was given Ativan.  He was also placed on CIWA protocol.  Patient be admitted to the ICU.  Patient continues to require pressors.  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    5/27 restarted on levophed, bumex held   5/28 - on Levophed, midodrine increased   5/30 - continues on Levophed, intermittently following commands, midodrine increased   5/31 - Bleeding through NG tube, INR  Chronic anemia  Chronic alcoholic  History of heart valve replacement on warfarin  History of AICD  Elevated QTc  Multiple falls  History of CVA  Hypernatremia   Concern for GI bleed - NG tube with blood output 5/31      Plan:  Transferred out of the ICU on 5/22, transferred back to the ICU 5/23, intubated and sedated  Neurology consulted, on Keppra, high-dose thiamine, CIWA protocol, unable to have MRI, seizure precautions. EEG completed 5/23- moderate nonspecific encephalopathy, No seizures or epileptiform discharges were noted during this study.  Currently on Keppra  Nephrology following for FRANTZ on CKD, off bumex   Currently on levophed, started on midodrine  Status post vitamin K, monitoring PT/INR, pharmacy dosing warfarin. - held due to GI bleeding   GI consulted, serial H&H   On lactulose, rifaximin   N.p.o., TF  On D5 infusion   ID following, off aztreonam currently on micafungin   Monitor LFT's, right upper quadrant ultrasound with cholelithiasis, no signs of cholecystitis, steatosis.  Continue Lactulose and Rifaximin.     Palliative care consult for goals of care    Code Status: Full Code  DVT/GI Prophylaxis: Warfarin     Dispo: Pending clinical course.      NOTE: This report was transcribed using voice recognition software. Every effort was made to ensure accuracy; however, inadvertent computerized transcription errors may be present.  Electronically signed by Elsi Weber MD on 6/1/2025 at 10:18 AM

## 2025-06-01 NOTE — PLAN OF CARE
Inform patient/family regarding the reason for restraint   Every 2 hours: Monitor safety, psychosocial status, comfort, nutrition and hydration  Taken 6/1/2025 0400 by Penny Ralph RN  Remains free of injury from restraints (restraint for interference with medical device):   Determine that other, less restrictive measures have been tried or would not be effective before applying the restraint   Evaluate the patient's condition at the time of restraint application   Inform patient/family regarding the reason for restraint   Every 2 hours: Monitor safety, psychosocial status, comfort, nutrition and hydration  Taken 6/1/2025 0200 by Penny Ralph RN  Remains free of injury from restraints (restraint for interference with medical device):   Determine that other, less restrictive measures have been tried or would not be effective before applying the restraint   Evaluate the patient's condition at the time of restraint application   Inform patient/family regarding the reason for restraint   Every 2 hours: Monitor safety, psychosocial status, comfort, nutrition and hydration  Taken 6/1/2025 0007 by Penny Ralph RN  Remains free of injury from restraints (restraint for interference with medical device):   Determine that other, less restrictive measures have been tried or would not be effective before applying the restraint   Evaluate the patient's condition at the time of restraint application   Inform patient/family regarding the reason for restraint   Every 2 hours: Monitor safety, psychosocial status, comfort, nutrition and hydration  5/31/2025 2334 by Penny Ralph RN  Outcome: Progressing  Flowsheets  Taken 5/31/2025 2200 by Penny Ralph RN  Remains free of injury from restraints (restraint for interference with medical device):   Determine that other, less restrictive measures have been tried or would not be effective before applying the restraint   Evaluate the patient's  condition at the time of restraint application   Inform patient/family regarding the reason for restraint   Every 2 hours: Monitor safety, psychosocial status, comfort, nutrition and hydration  Taken 5/31/2025 2000 by Penny Ralph RN  Remains free of injury from restraints (restraint for interference with medical device):   Determine that other, less restrictive measures have been tried or would not be effective before applying the restraint   Evaluate the patient's condition at the time of restraint application   Inform patient/family regarding the reason for restraint   Every 2 hours: Monitor safety, psychosocial status, comfort, nutrition and hydration  Taken 5/31/2025 1800 by Valery Kwong RN  Remains free of injury from restraints (restraint for interference with medical device): Determine that other, less restrictive measures have been tried or would not be effective before applying the restraint  Taken 5/31/2025 1600 by Valery Kwong RN  Remains free of injury from restraints (restraint for interference with medical device): Determine that other, less restrictive measures have been tried or would not be effective before applying the restraint  Taken 5/31/2025 1400 by Valery Kwong RN  Remains free of injury from restraints (restraint for interference with medical device): Determine that other, less restrictive measures have been tried or would not be effective before applying the restraint  Taken 5/31/2025 1200 by Valery Kwong RN  Remains free of injury from restraints (restraint for interference with medical device): Determine that other, less restrictive measures have been tried or would not be effective before applying the restraint     Problem: Skin/Tissue Integrity  Goal: Skin integrity remains intact  Description: 1.  Monitor for areas of redness and/or skin breakdown2.  Assess vascular access sites hourly3.  Every 4-6 hours minimum:  Change oxygen saturation probe site4.   Every 4-6 hours:  If on nasal continuous positive airway pressure, respiratory therapy assess nares and determine need for appliance change or resting period  6/1/2025 0924 by Jian Aviles RN  Outcome: Progressing  5/31/2025 2334 by Penny Ralph RN  Outcome: Progressing     Problem: Nutrition Deficit:  Goal: Optimize nutritional status  Outcome: Not Progressing     Problem: Neurosensory - Adult  Goal: Achieves stable or improved neurological status  6/1/2025 0924 by Jian Aviles RN  Outcome: Progressing  5/31/2025 2334 by Penny Ralph RN  Outcome: Progressing  Goal: Absence of seizures  6/1/2025 0924 by Jian Aviles RN  Outcome: Progressing  5/31/2025 2334 by Penny Ralph RN  Outcome: Progressing     Problem: Nutrition Deficit:  Goal: Optimize nutritional status  Outcome: Not Progressing     Problem: Respiratory - Adult  Goal: Achieves optimal ventilation and oxygenation  6/1/2025 0924 by Jian Aviles RN  Outcome: Progressing  5/31/2025 2334 by Penny Ralph RN  Outcome: Not Progressing     Problem: Gastrointestinal - Adult  Goal: Maintains adequate nutritional intake  6/1/2025 0924 by Jian Aviles RN  Outcome: Not Progressing  5/31/2025 2334 by Penny Ralph RN  Outcome: Progressing

## 2025-06-01 NOTE — PLAN OF CARE
Problem: Nutrition Deficit:  Goal: Optimize nutritional status  5/31/2025 1050 by Valery Kwong RN  Outcome: Not Progressing     Problem: Confusion  Goal: Confusion, delirium, dementia, or psychosis is improved or at baseline  Description: INTERVENTIONS:1. Assess for possible contributors to thought disturbance, including medications, impaired vision or hearing, underlying metabolic abnormalities, dehydration, psychiatric diagnoses, and notify attending LIP2. [place] high risk fall precautions, as indicated3. Provide frequent short contacts to provide reality reorientation, refocusing and direction4. Decrease environmental stimuli, including noise as appropriate5. Monitor and intervene to maintain adequate nutrition, hydration, elimination, sleep and activity6. If unable to ensure safety without constant attention obtain sitter and review sitter guidelines with assigned personnel7. Initiate Psychosocial CNS and Spiritual Care consult, as indicated  INTERVENTIONS:1. Assess for possible contributors to thought disturbance, including medications, impaired vision or hearing, underlying metabolic abnormalities, dehydration, psychiatric diagnoses, and notify attending LIP2. [place] high risk fall precautions, as indicated3. Provide frequent short contacts to provide reality reorientation, refocusing and direction4. Decrease environmental stimuli, including noise as appropriate5. Monitor and intervene to maintain adequate nutrition, hydration, elimination, sleep and activity6. If unable to ensure safety without constant attention obtain sitter and review sitter guidelines with assigned personnel7. Initiate Psychosocial CNS and Spiritual Care consult, as indicated  INTERVENTIONS:1. Assess for possible contributors to thought disturbance, including medications, impaired vision or hearing, underlying metabolic abnormalities, dehydration, psychiatric diagnoses, and notify attending LIP2. [place] high risk fall

## 2025-06-01 NOTE — CONSULTS
Gastroenterology, Hepatology, &  Advanced Endoscopy    Consult Note      Reason for Consult: GI bleeding, hx alcoholic cirrhosis, ? Need for a scope     HPI:   Len Dorsey is a 59 y.o. male w/ PMH of  has a past medical history of Alcoholic liver disease, H/O prosthetic aortic valve replacement, History of mitral valve replacement, Stroke (HCC), and Testicular cancer (HCC). who presents to the ED due to failure to thrive. Patient has a history of alcoholic cirrhosis as well. His hospital course was complicated with upper GI bleeding and acute hypoxic respiratory failure. He was sent to the MICU from the ED and then transferred out of the MICU on 5/22, he was transferred back on 5/23 and intubated there. Patient has been intubated for 9 days.       PMH:       Diagnosis Date    Alcoholic liver disease     H/O prosthetic aortic valve replacement 09/2020    [facility]  Mitral and pacemaker at this time as well    History of mitral valve replacement 09/2020    at [facility]  Aortic Valve placed at this time as well as pacemaker    Stroke (HCC)     Testicular cancer (HCC)     in remission since 1988        PSH:       Procedure Laterality Date    COLONOSCOPY N/A 8/24/2022    COLONOSCOPY POLYPECTOMY HOT BIOPSY performed by Rene Lorenzo MD at [facility] ENDOSCOPY    FRACTURE SURGERY      Left Tibial Plateau Fracture 3/29/2022     PACEMAKER PLACEMENT  09/2020    UPPER GASTROINTESTINAL ENDOSCOPY N/A 8/24/2022    EGD DIAGNOSTIC ONLY performed by Rene Lorenzo MD at [facility] ENDOSCOPY    UPPER GASTROINTESTINAL ENDOSCOPY N/A 5/9/2024    ESOPHAGOGASTRODUODENOSCOPY          +++AVAIL 1430+++ performed by Min Mcdaniels DO at [facility] ENDOSCOPY    UPPER GASTROINTESTINAL ENDOSCOPY N/A 2/28/2025    ESOPHAGOGASTRODUODENOSCOPY BIOPSY performed by Herbert Wells MD at [facility] ENDOSCOPY        Family History: History reviewed. No pertinent family history.     Social History:   Social History     Tobacco Use    Smoking status: Every  an admission earlier this year with similar symptoms.     PLAN:  - Continue to closely monitor INR to therapeutic range. INR currently 1.4, INR > 10.0 5/19/25, 3.3 5/31/25  - If continued blood loss, will need complete reversal with closely dosed heparin gtt.  - EGD in 2/28/25  (as noted above) with no high risk portal hypertensive changes. He has portal hypertensive gastropathy which is certainly bleeding in the setting of supratherapeutic INR.  - PPI BID.  - No plans for repeat endoscopic evaluation.   - Will need improved plans for monitoring INR on outpatient basis.   - Monitor for francisco signs of bleeding  -Transfuse to keep Hgb> 7.0, Hgb 7.8      Thank you for including us in the care of this patient. Please do not hesitate to reach out with any questions.    Greater than or equal to 60 minutes was spent providing face-to-face patient care, including:  and coordinating care, reviewing the chart, labs, and diagnostics, as well as medical decision making. Greater than 50% of this time was spent instructing and counseling the patient face to face regarding findings and recommendations.     Discussed with Dr. JAIMES  Plan per Dr. THEODORE Kurtz, MSN,CRNP 6/1/2025 5:22 PM For Dr. JAIMES

## 2025-06-01 NOTE — PROGRESS NOTES
[facility]  [facility]  Progress Note    SUBJECTIVE:  Chief Complaint   Patient presents with    Failure To Thrive     Patient not caring for self - canceling doctors appointments and not taking medications. Daily drinker. C/O SOB.    CC respiratory failure, leukocytosis, fever    Patient resting in bed.  Remains critically ill, intubated on mechanical ventilation.  On Precedex.     Review of systems:  As stated above in the chief complaint, otherwise negative.    Medications:  Scheduled Meds:   hydrocortisone sodium succinate PF (SOLU-CORTEF) 50 mg in sterile water 2 mL injection  50 mg IntraVENous Q12H    folic acid  1 mg IntraVENous Daily    micafungin  100 mg IntraVENous Daily    [Held by provider] metoclopramide  5 mg IntraVENous Q6H    [Held by provider] midodrine  20 mg Oral TID WC    levETIRAcetam  250 mg IntraVENous BID    vitamin B-6  50 mg Oral Daily    white petrolatum   Topical BID    acetylcysteine  600 mg Inhalation BID RT    ipratropium 0.5 mg-albuterol 2.5 mg  1 Dose Inhalation Q4H WA RT    budesonide  0.5 mg Nebulization BID RT    arformoterol tartrate  15 mcg Nebulization BID RT    [Held by provider] bumetanide  1 mg Oral Daily    chlorhexidine  15 mL Mouth/Throat BID    thiamine  100 mg IntraVENous Daily    [Held by provider] lactulose  20 g Oral TID    atorvastatin  20 mg Oral Nightly    [Held by provider] rifAXIMin  400 mg Oral TID    [Held by provider] busPIRone  5 mg Oral BID    sodium chloride flush  5-40 mL IntraVENous 2 times per day    multivitamin  1 tablet Oral Daily    [Held by provider] folic acid  1 mg Oral Daily    pantoprazole  40 mg IntraVENous BID     Continuous Infusions:   dextrose 75 mL/hr at 06/01/25 0929    sodium chloride      heparin (PORCINE) Infusion 10 Units/kg/hr (06/01/25 0728)    dexmedeTOMIDine (PRECEDEX) 1,000 mcg in sodium chloride 0.9 % 250 mL infusion 0.2 mcg/kg/hr (06/01/25 0929)    norepinephrine Stopped (05/30/25 1946)    [Held

## 2025-06-02 NOTE — PROGRESS NOTES
Neuro Inpatient Follow Up Note       Len Dorsey is a 59 y.o.  male     Neuro is following for witnessed seizure-like activity and embolic strokes    PMH: Stroke, history of testicular cancer, mitral valve replacement, aortic valve replacement, alcoholic liver disease    Patient initially presented to ED on 5/19 for failure to thrive.  He lives at home with his fiancée where he has a history of significant alcohol use although he has not had a drink in 1 month.  Patient reported he had not been taking his meds because he ran out of them and has not been going to his doctors appointments.  Initially neurology saw him 05/20 for AMS and possible seizure.  EEG on 5/23 showed moderate nonspecific encephalopathy without seizure with subsequent neuro signed off on 5/22 with plans for EEG as outpatient.    We were consulted again on 5/29 for reevaluation for continued AMS.  Vital signs have been stable on the vent  ROS is limited given intubation and patient condition.    There is no family present at bedside currently    6/2/25 pt lying in bed with no family at bedside. He is intubated and not sedated.  He opens eyes to his name called, followed commands for me as able, nodded head appropriately.      Objective:     BP (!) 109/48   Pulse 64   Temp 97.9 °F (36.6 °C) (Esophageal)   Resp 20   Ht 1.778 m (5' 10\")   Wt 122 kg (268 lb 15.4 oz)   SpO2 97%   BMI 38.59 kg/m²     General appearance: Intubated, sedated, no obvious distress  Head: normocephalic, without obvious abnormality, atraumatic  Eyes: conjunctivae/corneas clear  Neck: supple, symmetrical, trachea midline  Lungs: Unlabored breaths on vent  Extremities: normal, atraumatic, no cyanosis   Skin: color, texture, turgor normal      Mental Status: Intubated, not sedated, follows commands for me today.    Speech/Language: Intubated    Cranial Nerves:  I: smell NA   II: visual acuity  NA   II: visual fields Full to confrontation   II: pupils CEFERINO 5 mm brisk

## 2025-06-02 NOTE — PROGRESS NOTES
06/02/25 1209   Patient Observation   Pulse 60   Respirations 18   SpO2 98 %   Vent Information   Vent Mode CPAP/PS   Ventilator Settings   PEEP/CPAP (cmH2O) 5   FiO2  35 %   Pressure Support (cm H2O) 18 cm H2O   Vent Patient Data (Readings)   Vt (Measured) 421 mL   Peak Inspiratory Pressure (cmH2O) 25 cmH2O   Rate Measured 20 br/min   Minute Volume (L/min) 7.68 Liters   Mean Airway Pressure (cmH2O) 10 cmH20   Plateau Pressure (cm H2O) 23 cm H2O   Driving Pressure 18   I:E Ratio 1:5.00   Flow Sensitivity 3 L/min   Backup Apnea On   Backup Rate 14 Breaths Per Minute   Backup Vt 450   Vent Alarm Settings   High Pressure (cmH2O) 45 cmH2O   Low Minute Volume (lpm) 4 L/min   High Minute Volume (lpm) 15 L/min   Low Exhaled Vt (ml) 350 mL   High Exhaled Vt (ml) 800 mL   RR High (bpm) 35 br/min   Apnea (secs) 20 secs   Additional Respiratoray Assessments   Humidification Source Heated wire   Humidification Temp 37   Circuit Condensation Drained   Ambu Bag With Mask At Bedside Yes   Airway Clearance   Suction ET Tube   Suction Device Inline suction catheter   Sputum Method Obtained Endotracheal   Sputum Amount Small   Sputum Color/Odor Clear   Sputum Consistency Thin   ETT    Placement Date/Time: 05/23/25 1513   Placement Verified By: Capnometry;Chest X-ray  Preoxygenation: Yes  Mask Ventilation: Ventilated by mask (1)  Airway Type: Cuffed  Airway Tube Size: 8 mm  Location: Oral  Insertion attempts: 1  Secured At: 25 cm  M...   Secured At 25 cm   Measured From Lips   Secured By Commercial tube hinton   Site Assessment Dry

## 2025-06-02 NOTE — PLAN OF CARE
Patient in bilateral wrist restraints, when released reaching for lines and tubes.     Problem: Safety - Adult  Goal: Free from fall injury  6/2/2025 0902 by Claudio Sanchez RN  Outcome: Progressing  6/2/2025 0101 by Michi Girard RN  Outcome: Progressing  6/1/2025 2150 by Penny Ralph RN  Outcome: Progressing     Problem: Pain  Goal: Verbalizes/displays adequate comfort level or baseline comfort level  6/2/2025 0902 by Claudio Sanchez RN  Outcome: Progressing  6/2/2025 0101 by Michi Girard RN  Outcome: Progressing  6/1/2025 2150 by Penny Ralph RN  Outcome: Progressing     Problem: Safety - Medical Restraint  Goal: Remains free of injury from restraints (Restraint for Interference with Medical Device)  Description: INTERVENTIONS:1. Determine that other, less restrictive measures have been tried or would not be effective before applying the restraint2. Evaluate the patient's condition at the time of restraint application3. Inform patient/family regarding the reason for restraint4. Q2H: Monitor safety, psychosocial status, comfort, nutrition and hydration  6/2/2025 0902 by Claudio Sanchez RN  Outcome: Progressing  6/2/2025 0101 by Michi Girard RN  Outcome: Progressing  Flowsheets  Taken 6/2/2025 0000 by Michi Girard RN  Remains free of injury from restraints (restraint for interference with medical device):   Determine that other, less restrictive measures have been tried or would not be effective before applying the restraint   Evaluate the patient's condition at the time of restraint application   Inform patient/family regarding the reason for restraint   Every 2 hours: Monitor safety, psychosocial status, comfort, nutrition and hydration  Taken 6/1/2025 2200 by Penny Ralph RN  Remains free of injury from restraints (restraint for interference with medical device):   Determine that other, less restrictive measures have been tried or would not be effective  indicated  INTERVENTIONS:1. Assess for possible contributors to thought disturbance, including medications, impaired vision or hearing, underlying metabolic abnormalities, dehydration, psychiatric diagnoses, and notify attending LIP2. Hubbell high risk fall precautions, as indicated3. Provide frequent short contacts to provide reality reorientation, refocusing and direction4. Decrease environmental stimuli, including noise as appropriate5. Monitor and intervene to maintain adequate nutrition, hydration, elimination, sleep and activity6. If unable to ensure safety without constant attention obtain sitter and review sitter guidelines with assigned personnel7. Initiate Psychosocial CNS and Spiritual Care consult, as indicated  INTERVENTIONS:1. Assess for possible contributors to thought disturbance, including medications, impaired vision or hearing, underlying metabolic abnormalities, dehydration, psychiatric diagnoses, and notify attending LIP2. Hubbell high risk fall precautions, as indicated3. Provide frequent short contacts to provide reality reorientation, refocusing and direction4. Decrease environmental stimuli, including noise as appropriate5. Monitor and intervene to maintain adequate nutrition, hydration, elimination, sleep and activity6. If unable to ensure safety without constant attention obtain sitter and review sitter guidelines with assigned personnel7. Initiate Psychosocial CNS and Spiritual Care consult, as indicated  INTERVENTIONS:1. Assess for possible contributors to thought disturbance, including medications, impaired vision or hearing, underlying metabolic abnormalities, dehydration, psychiatric diagnoses, and notify attending LIP2. Hubbell high risk fall precautions, as indicated3. Provide frequent short contacts to provide reality reorientation, refocusing and direction4. Decrease environmental stimuli, including noise as appropriate5. Monitor and intervene to maintain adequate nutrition,  Cheilitis Normal Treatment: I recommended application of CeraVe healing ointment or Aquaphor numerous times a day (as often as every hour) and before going to bed.

## 2025-06-02 NOTE — PROGRESS NOTES
[facility]  Department of Internal Medicine   Internal Medicine Residency   MICU Progress Note    Patient:  Len Dorsey 59 y.o. male  MRN: [id number]     Date of Service: 6/2/2025    Allergy: Patient has no known allergies.    Subjective     The patient was seen and examined at bedside.  Patient is still intubated, sedated on Precedex.  Patient started on heparin drip, hemoglobin stable.  General surgery following for possible PEG tube and trach placement, palliative care to be discussed with family.    Objective     VS: /65   Pulse 60   Temp 98.2 °F (36.8 °C) (Esophageal)   Resp 16   Ht 1.778 m (5' 10\")   Wt 122 kg (268 lb 15.4 oz)   SpO2 96%   BMI 38.59 kg/m²   ABP (Arterial line BP): 128/65  ABP Mean (Arterial Line Mean): 86 mmHg    I & O - 24hr:   Intake/Output Summary (Last 24 hours) at 6/2/2025 0845  Last data filed at 6/2/2025 0800  Gross per 24 hour   Intake 387.49 ml   Output 2085 ml   Net -1697.51 ml       Physical Exam:  General Appearance: intubated, sedated  Neck: no adenopathy, no carotid bruit, supple, symmetrical, trachea midline, and thyroid not enlarged, symmetric, no tenderness/mass/nodules  Lung: rales bilaterally, diminished breath sounds on the left  Heart: normal rate with irregular rhythm  Abdomen: distended, firm, no guarding  Extremities:   2+ pitting edema up to the thighs  Musculoskeletal: No joint swelling, no muscle tenderness. ROM normal in all joints of extremities.   Neurologic: Mental status: opens eyes to voice, follows commands    Lines     site day    Art line   R Radial 5/27/2025   TLC R IJ 5/28/2025   PICC None    Hemoaccess None      Mechanical Ventilation:  Mode: A/C   TV: 450 ml RR: 18  PEEP: 5 cmH2O FiO2: 40    ABG:     Lab Results   Component Value Date/Time    PH 7.483 06/02/2025 04:47 AM    PCO2 31.0 06/02/2025 04:47 AM    PO2 127.6 06/02/2025 04:47 AM    HCO3 22.7 06/02/2025 04:47 AM    BE -0.3 06/02/2025 04:47 AM    THB 8.8 06/02/2025

## 2025-06-02 NOTE — PROGRESS NOTES
Gastroenterology, Hepatology, &  Advanced Endoscopy    Progress Note      Reason for Consult: GI bleeding, hx alcoholic cirrhosis, ? Need for a scope     HPI:   Len Dorsey is a 59 y.o. male w/ PMH of  has a past medical history of Alcoholic liver disease, H/O prosthetic aortic valve replacement, History of mitral valve replacement, Stroke (HCC), and Testicular cancer (HCC). who presents to the ED due to failure to thrive. Patient has a history of alcoholic cirrhosis as well. His hospital course was complicated with upper GI bleeding and acute hypoxic respiratory failure. He was sent to the MICU from the ED and then transferred out of the MICU on 5/22, he was transferred back on 5/23 and intubated there. Patient has been intubated for 9 days.       PMH:       Diagnosis Date    Alcoholic liver disease     H/O prosthetic aortic valve replacement 09/2020    [facility]  Mitral and pacemaker at this time as well    History of mitral valve replacement 09/2020    at [facility]  Aortic Valve placed at this time as well as pacemaker    Stroke (HCC)     Testicular cancer (HCC)     in remission since 1988        PSH:       Procedure Laterality Date    COLONOSCOPY N/A 8/24/2022    COLONOSCOPY POLYPECTOMY HOT BIOPSY performed by Rene Lorenzo MD [facility]    FRACTURE SURGERY      Left Tibial Plateau Fracture 3/29/2022     PACEMAKER PLACEMENT  09/2020    UPPER GASTROINTESTINAL ENDOSCOPY N/A 8/24/2022    EGD DIAGNOSTIC ONLY performed by Rene Lorenzo MD at [facility]    UPPER GASTROINTESTINAL ENDOSCOPY N/A 5/9/2024    ESOPHAGOGASTRODUODENOSCOPY          +++AVAIL 1430+++ performed by Min Mcdaniels DO at [facility] ENDOSCOPY    UPPER GASTROINTESTINAL ENDOSCOPY N/A 2/28/2025    ESOPHAGOGASTRODUODENOSCOPY BIOPSY performed by Herbert Wells MD at [facility]        Family History: History reviewed. No pertinent family history.     Social History:   Social History     Tobacco Use    Smoking status: Every  6/1/2025 4:56 pm    TECHNIQUE:  CT of the head was performed without the administration of intravenous  contrast. Automated exposure control, iterative reconstruction, and/or weight  based adjustment of the mA/kV was utilized to reduce the radiation dose to as  low as reasonably achievable.    COMPARISON:  05/23/2025    HISTORY:  ORDERING SYSTEM PROVIDED HISTORY: Continue AMS; cannot have MRI  TECHNOLOGIST PROVIDED HISTORY:  Reason for exam:->Continue AMS; cannot have MRI  Has a \"code stroke\" or \"stroke alert\" been called?->No  What reading provider will be dictating this exam?->CRC    FINDINGS:  BRAIN/VENTRICLES: Unchanged encephalomalacia at the most inferior aspect of  the right frontal lobe abutting the anterior cranial fossa, may represent  sequela prior trauma.  patchy periventricular, corona radiata and centrum  semiovale hypoattenuation, suggestive of moderate microvascular ischemic  white matter disease.There is no acute intracranial hemorrhage, mass effect  or midline shift.  No abnormal extra-axial fluid collection.  The gray-white  differentiation is maintained without evidence of an acute infarct.  There is  no evidence of hydrocephalus.    ORBITS: The visualized portion of the orbits demonstrate no acute abnormality.    SINUSES: The visualized paranasal sinuses and mastoid air cells demonstrate  no acute abnormality.    SOFT TISSUES/SKULL:  No acute abnormality of the visualized skull or soft  tissues.    Impression  No acute intracranial abnormality.    Stable chronic changes.     MRI Result (most recent):  No results found for this or any previous visit from the past 3650 days.         Recent Labs     05/31/25  0350 05/31/25  0700 05/31/25  2228 06/01/25  0358 06/02/25  0316   INR 4.9 3.3 1.4 1.4  --    ALT 12  --   --  11 13   AST 19  --   --  16 16   ALKPHOS 203*  --   --  168* 155*   BILITOT 1.1  --   --  1.3* 1.2   BILIDIR 0.6*  --   --  0.7* 0.7*     Lab Results   Component Value Date    WBC 5.8  presents with GI bleeding secondary to coagulopathy and supratherapeutic INR.    CT A/P w/o contrast  6/1/25 found small volume ascites in predominantly perihepatic, perisplenic and pelvic  distribution. Cirrhosis and splenomegaly. Trace bilateral pleural effusions.Cardiomegaly.     He had an admission earlier this year with similar symptoms.     PLAN:  - Continue to closely monitor INR to therapeutic range. INR currently 1.4, INR > 10.0 5/19/25, 3.3 5/31/25  - EGD in 2/28/25  (as noted above) with no high risk portal hypertensive changes. He has portal hypertensive gastropathy which is certainly bleeding in the setting of supratherapeutic INR.  - PPI BID.  - No plans for repeat endoscopic evaluation.   - Will need improved plans for monitoring INR on outpatient basis.   - Monitor for francisco signs of bleeding  -Transfuse to keep Hgb> 7.0, Hgb 7.8->8.1  -Paracentesis PRN      Thank you for including us in the care of this patient. Please do not hesitate to reach out with any questions.    Greater than or equal to 35 minutes was spent providing face-to-face patient care, including:  and coordinating care, reviewing the chart, labs, and diagnostics, as well as medical decision making. Greater than 50% of this time was spent instructing and counseling the patient face to face regarding findings and recommendations.     Discussed with Dr. JAIMES  Plan per Dr. THEODORE Kurtz, MSN,CRNP 6/2/2025 10:19 AM For Dr. JAIMES

## 2025-06-02 NOTE — PROGRESS NOTES
Department of Internal Medicine  Infectious Diseases  Progress Note      C/C:  Resp failure, D glucan antigenemia     Pt is intubated  Awake ,alert  Denies fever       Current Facility-Administered Medications   Medication Dose Route Frequency Provider Last Rate Last Admin    [START ON 6/3/2025] hydrocortisone sodium succinate PF (SOLU-CORTEF) 50 mg in sterile water 2 mL injection  50 mg IntraVENous Daily Angus Davenport MD        dextrose 5 % solution   IntraVENous Continuous Taiwo Sullivan  mL/hr at 06/02/25 0452 New Bag at 06/02/25 0452    folic acid injection 1 mg  1 mg IntraVENous Daily Arlene Puentes DO   1 mg at 06/02/25 0749    heparin 25,000 units in dextrose 5% 250 mL (premix) infusion  5-30 Units/kg/hr IntraVENous Continuous Caitlyn Sagastume MD 14.6 mL/hr at 06/02/25 0359 12 Units/kg/hr at 06/02/25 0359    heparin (porcine) injection 4,000 Units  4,000 Units IntraVENous PRN Caitlyn Sagastume MD        heparin (porcine) injection 2,000 Units  2,000 Units IntraVENous PRN Caitlyn Sagastume MD   2,000 Units at 06/01/25 1451    micafungin (MYCAMINE) 100 mg in sodium chloride 0.9 % 100 mL IVPB ([id number])  100 mg IntraVENous Daily Mikael Redding MD   Stopped at 06/02/25 0848    [Held by provider] midodrine (PROAMATINE) tablet 20 mg  20 mg Oral TID  Angus Davenport MD   20 mg at 05/30/25 1718    levETIRAcetam (KEPPRA) injection 250 mg  250 mg IntraVENous BID Jose Alberto Bourgeois MD   250 mg at 06/02/25 0744    vitamin B-6 (PYRIDOXINE) tablet 50 mg  50 mg Oral Daily Jose Alberto Bourgeois MD        white petrolatum ointment   Topical BID Caitlyn Sagastume MD   Given at 06/02/25 0744    dexmedeTOMIDine (PRECEDEX) 1,000 mcg in sodium chloride 0.9 % 250 mL infusion  0.1-1.5 mcg/kg/hr IntraVENous Continuous Angus Davenport MD 12.2 mL/hr at 06/02/25 0601 0.4 mcg/kg/hr at 06/02/25 0601    midazolam PF (VERSED) injection 2 mg  2 mg IntraVENous Q4H PRN Caitlyn Sagastume MD   2 mg at 05/28/25  ARNALDO Ferrer CNP   650 mg at 05/27/25 1045    Or    acetaminophen (TYLENOL) suppository 650 mg  650 mg Rectal Q6H PRN Carissa Good APRN - CNP        [Held by provider] melatonin tablet 3 mg  3 mg Oral Nightly PRN Carissa Good APRN - CNP        ondansetron (ZOFRAN-ODT) disintegrating tablet 4 mg  4 mg Oral Q8H PRN Len Arguello DO        Or    ondansetron (ZOFRAN) injection 4 mg  4 mg IntraVENous Q6H PRN Len Arguello DO        polyethylene glycol (GLYCOLAX) packet 17 g  17 g Oral Daily PRN Len Arguello DO   17 g at 05/21/25 0853    pantoprazole (PROTONIX) injection 40 mg  40 mg IntraVENous BID Caitlyn Sagastume MD   40 mg at 06/02/25 0744    glucose chewable tablet 16 g  4 tablet Oral PRN Caitlyn Sagastume MD        dextrose bolus 10% 125 mL  125 mL IntraVENous PRN Caitlyn Sagastume MD   Stopped at 05/20/25 0005    Or    dextrose bolus 10% 250 mL  250 mL IntraVENous PRN Caitlyn Sagastume MD        glucagon injection 1 mg  1 mg SubCUTAneous PRN Caitlyn Sagastume MD        dextrose 10 % infusion   IntraVENous Continuous PRN Catilyn Sagastume MD           PHYSICAL EXAM:      Vitals:     BP (!) 121/56   Pulse 60   Temp 98.2 °F (36.8 °C) (Esophageal)   Resp 21   Ht 1.778 m (5' 10\")   Wt 122 kg (268 lb 15.4 oz)   SpO2 97%   BMI 38.59 kg/m²     General Appearance:    Intubated, awake and alert    Head:    Normocephalic, atraumatic   Eyes:    No pallor, no icterus,   Ears:    No obvious deformity or drainage.   Nose:   No nasal drainage   Throat:   Mucosa dry   Neck:   Supple, no lymphadenopathy   Lungs:     Clear     Heart:    Regular rate ,systolic murmur   Abdomen:     Soft, non-tender, bowel sounds present    Extremities:   + edema    Pulses:   Dorsalis pedis palpable      CBC with Differential:      Lab Results   Component Value Date/Time    WBC 5.8 06/02/2025 03:16 AM    RBC 3.05 06/02/2025 03:16 AM    HGB 8.1 06/02/2025 03:16 AM    HCT 27.3

## 2025-06-02 NOTE — PROGRESS NOTES
Spontaneous Parameters performed on PS 5 35% +5    Patient has a cuff leak    VT = 309 ml  f = 31  B/M  Ve = 8.79 L/M  NIF = -24  cmH2O  VC = 333 mL  RSBI = 100      Performed by Patience Tabor RCP

## 2025-06-02 NOTE — PROGRESS NOTES
[facility] Hospitalist Progress Note    Admitting Date and Time: 5/19/2025 10:40 AM  Admit Dx: Failure to thrive in adult [R62.7]  FRANTZ (acute kidney injury) [N17.9]  Supratherapeutic INR [R79.1]  Anticoagulated on Coumadin [Z79.01]  Alcohol withdrawal syndrome without complication (HCC) [F10.170]    Synopsis: Patient presented to the emergency department with concerns about not being able to take care of himself.  Patient apparently been canceled doctors appointments and not taking medications.  Has a history of alcohol abuse and is a daily drinker.  Patient states not taking his medications and he ran out of them.  He is not going to doctors appointments because he cannot get a hold of the doctor.  Also reported some shortness of breath.  Denies any fever, chills, nausea, vomiting, chest pain, headache, dizziness, abdominal pain, hematuria.,  Dysuria or constipation or diarrhea.  Vital signs within normal limits and stable.  Although reviewing his vital signs throughout his ER course he did drop to 85% on room air.  He is currently 99% on 3 L nasal cannula.  Laboratory studies demonstrate BUN 22, creatinine 2.4, procalcitonin 0.19, troponin 74 with repeat of 67, proBNP 5566, alk phos 259, hemoglobin 8.3 and a WBC of 12.3.  INR is greater than 10.  CT imaging shows some increased third spacing of fluid.  Chest x-ray shows bibasilar opacities.  Patient was started on ceftriaxone and doxycycline for presumed pneumonia.  He was also noted to have seizure-like activity for which she was given Ativan.  He was also placed on CIWA protocol.  Patient be admitted to the ICU.  Patient continues to require pressors.  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

## 2025-06-02 NOTE — PROGRESS NOTES
DAILY VENTILATOR WEANING ASSESSMENT PERFORMED    P/FIO2 Ratio = 365         (<100= do not Wean)                  Cs =   24                       (<32= Instability)  Plat. Pressure = 23    Was SAT completed yes,     If yes proceed with the Spontaneous Breathing Trial (SBT) as long as none of the following exclusion criteria are met:     [] P/FIO2 <100  [] FiO2 >50%  [] Peep >10 CMH2O  [] Patient on vasopressor  [] Failed SAT  [] SpO2 < 88%  [] Active MI  [] Lack of inspiratory effort     If any exclusion criteria are meet Do Not Proceed to SBT.    Was SBT completed no    Ask Physician for a weaning plan no    Instabilities:    Cardiovascular     CNS      [] Mean BP less than or equal to 75   [] Neuromuscular blockade  [] Heart Rate greater than 130   [] RASS of -3, -4, -5  [] Mechanical Assist Device    []  RASS of +3, +4         [] ICP > 15 or Intracranial Hypertension         Respiratory      Metabolic   [] Temp. (8hrs) < 95 or > 103  [] Respiratory Rate greater than 35   [] WBC < 5000 or > 64911  [] Minute Volume greater than 15L  [] pH less than 7.30  [] Deteriorating chest X-ray         Parameters    no    NIF=  VC=  MV =  RSBI =      Additional Comments: PS 18 35% +5    Performed by Patience Tabor RCP RRT

## 2025-06-02 NOTE — PLAN OF CARE
Patient is in Bilateral Wrist Restraints. When released pulls at Lines and Tubes.  Problem: Nutrition Deficit:  Goal: Optimize nutritional status  6/1/2025 2150 by Penny Ralph RN  Outcome: Not Progressing  6/1/2025 0924 by Jian Aviles RN  Outcome: Not Progressing     Problem: Respiratory - Adult  Goal: Achieves optimal ventilation and oxygenation  6/1/2025 2150 by Penny Ralph RN  Outcome: Not Progressing  6/1/2025 1345 by Lin Zhao RCP  Outcome: Not Progressing  6/1/2025 0924 by Jian Aviles RN  Outcome: Progressing     Problem: Gastrointestinal - Adult  Goal: Maintains adequate nutritional intake  6/1/2025 2150 by Penny Ralph RN  Outcome: Not Progressing  6/1/2025 0924 by Jian Aviles RN  Outcome: Not Progressing     Problem: Chronic Conditions and Co-morbidities  Goal: Patient's chronic conditions and co-morbidity symptoms are monitored and maintained or improved  6/1/2025 2150 by Penny Ralph RN  Outcome: Progressing  6/1/2025 0924 by Jian Aviles RN  Outcome: Progressing     Problem: Discharge Planning  Goal: Discharge to home or other facility with appropriate resources  6/1/2025 0924 by Jian Aviles RN  Outcome: Progressing     Problem: Safety - Adult  Goal: Free from fall injury  6/1/2025 2150 by Penny Ralph RN  Outcome: Progressing  6/1/2025 0924 by Jian Aviles RN  Outcome: Progressing     Problem: Pain  Goal: Verbalizes/displays adequate comfort level or baseline comfort level  6/1/2025 2150 by Penny Ralph RN  Outcome: Progressing  6/1/2025 0924 by Jian Aviles RN  Outcome: Progressing     Problem: Safety - Medical Restraint  Goal: Remains free of injury from restraints (Restraint for Interference with Medical Device)  Description: INTERVENTIONS:1. Determine that other, less restrictive measures have been tried or would not be effective before applying the restraint2. Evaluate the patient's condition at the time of restraint  adequate nutrition, hydration, elimination, sleep and activity6. If unable to ensure safety without constant attention obtain sitter and review sitter guidelines with assigned personnel7. Initiate Psychosocial CNS and Spiritual Care consult, as indicated  INTERVENTIONS:1. Assess for possible contributors to thought disturbance, including medications, impaired vision or hearing, underlying metabolic abnormalities, dehydration, psychiatric diagnoses, and notify attending LIP2. [place] high risk fall precautions, as indicated3. Provide frequent short contacts to provide reality reorientation, refocusing and direction4. Decrease environmental stimuli, including noise as appropriate5. Monitor and intervene to maintain adequate nutrition, hydration, elimination, sleep and activity6. If unable to ensure safety without constant attention obtain sitter and review sitter guidelines with assigned personnel7. Initiate Psychosocial CNS and Spiritual Care consult, as indicated  INTERVENTIONS:1. Assess for possible contributors to thought disturbance, including medications, impaired vision or hearing, underlying metabolic abnormalities, dehydration, psychiatric diagnoses, and notify attending LIP2. [place] high risk fall precautions, as indicated3. Provide frequent short contacts to provide reality reorientation, refocusing and direction4. Decrease environmental stimuli, including noise as appropriate5. Monitor and intervene to maintain adequate nutrition, hydration, elimination, sleep and activity6. If unable to ensure safety without constant attention obtain sitter and review sitter guidelines with assigned personnel7. Initiate Psychosocial CNS and Spiritual Care consult, as indicated  Outcome: Progressing     Problem: ABCDS Injury Assessment  Goal: Absence of physical injury  Outcome: Progressing     Problem: Nutrition Deficit:  Goal: Optimize nutritional status  6/1/2025 2150 by Penny Ralph, MELIDA  Outcome: Not  Progressing  6/1/2025 0924 by Jian Aviles RN  Outcome: Not Progressing     Problem: Respiratory - Adult  Goal: Achieves optimal ventilation and oxygenation  6/1/2025 2150 by Penny Ralph RN  Outcome: Not Progressing  6/1/2025 1345 by Lin Zhao RCP  Outcome: Not Progressing  6/1/2025 0924 by Jian Aviles RN  Outcome: Progressing     Problem: Gastrointestinal - Adult  Goal: Maintains adequate nutritional intake  6/1/2025 2150 by Penny Ralph RN  Outcome: Not Progressing  6/1/2025 0924 by Jian Aviles RN  Outcome: Not Progressing

## 2025-06-02 NOTE — PROGRESS NOTES
Chief Complaint   Patient presents with   • Dizziness   • Follow-up       Subjective   Bandar Macedo is a 34 y.o. male.       History of Present Illness     Pt has not had anymore severe symptoms since his last visit. He wore the holter monitor and his events did seem to correspond to occasional PVCs.     He still has some episodes of feeling like he has trouble focusing his eyes when he is driving. Gets an occasional dizzy feeling when driving.       Current Outpatient Medications:   •  famotidine (PEPCID) 40 MG tablet, Pepcid 40 mg tablet  Take 1 tablet as needed by oral route in the evening., Disp: , Rfl:      PMFSH  The following portions of the patient's history were reviewed and updated as appropriate: allergies, current medications, past family history, past medical history, past social history, past surgical history and problem list.    Review of Systems   Constitutional: Negative for appetite change, chills, fever and unexpected weight change.   HENT: Negative for ear discharge, sinus pressure and tinnitus.    Eyes: Negative for photophobia and pain.   Respiratory: Negative for chest tightness, shortness of breath and wheezing.    Cardiovascular: Negative for chest pain and palpitations.   Gastrointestinal: Negative for abdominal pain and blood in stool.   Endocrine: Negative for cold intolerance, heat intolerance, polydipsia and polyphagia.   Genitourinary: Negative.    Musculoskeletal: Negative for joint swelling.   Skin: Negative for color change and wound.   Allergic/Immunologic: Negative.    Neurological: Positive for dizziness, weakness and light-headedness. Negative for syncope, facial asymmetry and speech difficulty.   Hematological: Negative for adenopathy.   Psychiatric/Behavioral: Negative for confusion and hallucinations.       Objective   /78   Pulse 88   Wt 84.3 kg (185 lb 12.8 oz)   SpO2 98%   BMI 25.20 kg/m²     Physical Exam   Constitutional: He appears well-developed and  No acute issues overnight. On minimal vent settings, withdraws to pain. CT A/P showing moderate volume ascites. There is a family discussion planned tomorrow with palliative care. Pt is high risk for surgical intervention due to cirrhosis. Will discuss Trach/PEG today vs family discussion tomorrow.    Electronically signed by Sam Zapien MD on 6/2/2025 at 6:10 AM   well-nourished.   HENT:   Head: Normocephalic.   Right Ear: Hearing, tympanic membrane, external ear and ear canal normal.   Left Ear: Hearing, tympanic membrane, external ear and ear canal normal.   Nose: Nose normal.   Mouth/Throat: Oropharynx is clear and moist.   Eyes: Conjunctivae are normal. Pupils are equal, round, and reactive to light.   Neck: Normal range of motion.   Cardiovascular: Normal rate, regular rhythm and normal heart sounds.   Pulmonary/Chest: Effort normal and breath sounds normal. He has no decreased breath sounds. He has no wheezes. He has no rhonchi. He has no rales.   Musculoskeletal: Normal range of motion.   Neurological: He is alert.   Skin: Skin is warm and dry.   Psychiatric: He has a normal mood and affect. His behavior is normal.   Nursing note and vitals reviewed.           ASSESSMENT/PLAN    Problem List Items Addressed This Visit     None      Visit Diagnoses     Dizziness    -  Primary    Continue to maintain fluid intake, avoid excess caffeine and stress. Pt will have vision checked- let me know if no abnormalities noted.               Return in about 6 months (around 12/25/2019) for Annual.

## 2025-06-02 NOTE — PROGRESS NOTES
Department of Internal Medicine  Nephrology Attending progress Note    Events reviewed    SUBJECTIVE: We are following Mr. Dorsey for FRANTZ on CKD.  Presently intubated    PHYSICAL EXAM:      Vitals:    VITALS:  BP (!) 109/48   Pulse 64   Temp 97.9 °F (36.6 °C) (Esophageal)   Resp 20   Ht 1.778 m (5' 10\")   Wt 122 kg (268 lb 15.4 oz)   SpO2 97%   BMI 38.59 kg/m²   24HR INTAKE/OUTPUT:    Intake/Output Summary (Last 24 hours) at 6/2/2025 1406  Last data filed at 6/2/2025 1245  Gross per 24 hour   Intake 3566.84 ml   Output 2085 ml   Net 1481.84 ml       Constitutional: Patient is presently intubated, sedated  HEENT: Pupils equal reactive, endotracheal tube in place  Respiratory: Decreased breath sounds at the bases  Cardiovascular/Edema: Heart sounds are regular  Gastrointestinal: Abdomen soft  Neurologic: Unable to level  Skin: No lesion  Other: + Edema    Scheduled Meds:   [START ON 6/3/2025] hydrocortisone sodium succinate PF (SOLU-CORTEF) 50 mg in sterile water 2 mL injection  50 mg IntraVENous Daily    folic acid  1 mg IntraVENous Daily    micafungin  100 mg IntraVENous Daily    [Held by provider] midodrine  20 mg Oral TID WC    levETIRAcetam  250 mg IntraVENous BID    vitamin B-6  50 mg Oral Daily    white petrolatum   Topical BID    ipratropium 0.5 mg-albuterol 2.5 mg  1 Dose Inhalation Q4H WA RT    budesonide  0.5 mg Nebulization BID RT    arformoterol tartrate  15 mcg Nebulization BID RT    [Held by provider] bumetanide  1 mg Oral Daily    chlorhexidine  15 mL Mouth/Throat BID    thiamine  100 mg IntraVENous Daily    [Held by provider] lactulose  20 g Oral TID    atorvastatin  20 mg Oral Nightly    [Held by provider] rifAXIMin  400 mg Oral TID    [Held by provider] busPIRone  5 mg Oral BID    sodium chloride flush  5-40 mL IntraVENous 2 times per day    multivitamin  1 tablet Oral Daily    [Held by provider] folic acid  1 mg Oral Daily    pantoprazole  40 mg IntraVENous BID     Continuous  of 16.3, while in the ER he developed seizure activity and therefore he was admitted to the MICU.  His initial creatinine level was 2.4 mg/dl, and has increased to 2.9 mg/dL, reason for this consultation.    Problems resolved:    Hyperchloremic metabolic acidosis, probably due to GI losses  Hypernatremia, with hypervolemia, needs natriuresis, sodium levels  stable    IMPRESSION/RECOMMENDATIONS:      FRANTZ on CKD, hemodynamically mediated due to heart failure; resolved, renal function stable last 24 hours with adequate urine output >1.4 L    CKD stage IIIa, probably due to nephrosclerosis, baseline creatinine 1.9-2.1 mg/dL    Recurrent hypernatremia with hypovolemia, to replace free water    HFpEF 50%, proBNP 5566> 7050>6575, with anasarca and ascites    Hypotension, on norepinephrine and midodrine  Respiratory alkalosis  Normocytic anemia, with iron deficiency, ferritin 64, iron saturation 10%, folate 10.2, B12 >2000  --------------------------------------  Respiratory failure status post intubation  New onset seizures, on Depakote  Probably pneumonia, on ceftriaxone and doxycycline  Cirrhosis, 2/2 Alcohol abuse, on lactulose, rifaximin  CAD status post CABG, AVR and MVR mechanical valves  HLD, on atorvastatin   History of testicular cancer  History of CVA  Supratherapeutic INR  Nutrition, n.p.o.     Plan:     D5W at 75 cc/hour  Continue midodrine 5 mg 3 times daily  Continue to monitor renal function  Continue monitor sodium level

## 2025-06-02 NOTE — PROGRESS NOTES
06/02/25 1700   Patient Observation   Pulse 76   Respirations (!) 38   SpO2 100 %   Vent Information   Equipment Changed Expiratory Filter   Vent Mode CPAP/PS   Ventilator Settings   PEEP/CPAP (cmH2O) 5   FiO2  35 %   Pressure Support (cm H2O) (S)  8 cm H2O   Vent Patient Data (Readings)   Vt (Measured) 313 mL   Peak Inspiratory Pressure (cmH2O) 14 cmH2O   Rate Measured 26 br/min   Minute Volume (L/min) 7.57 Liters   Mean Airway Pressure (cmH2O) 8 cmH20   Plateau Pressure (cm H2O) 23 cm H2O   Driving Pressure 18   I:E Ratio 1:2.30   Flow Sensitivity 3 L/min   Static Compliance (L/cm H2O) 22   Backup Apnea On   Backup Rate 14 Breaths Per Minute   Backup Vt 450   Vent Alarm Settings   High Pressure (cmH2O) 45 cmH2O   Low Minute Volume (lpm) 4 L/min   High Minute Volume (lpm) 15 L/min   Low Exhaled Vt (ml) 350 mL   High Exhaled Vt (ml) 800 mL   RR High (bpm) 35 br/min   Apnea (secs) 20 secs   Additional Respiratoray Assessments   Humidification Source Heated wire   Humidification Temp 37   Circuit Condensation Drained   Ambu Bag With Mask At Bedside Yes   Airway Clearance   Suction ET Tube;Oral   Subglottic Suction Done Yes   Suction Device Inline suction catheter   Sputum Method Obtained Endotracheal   Sputum Amount Small   Sputum Color/Odor Clear   Sputum Consistency Thin   ETT    Placement Date/Time: 05/23/25 1513   Placement Verified By: Capnometry;Chest X-ray  Preoxygenation: Yes  Mask Ventilation: Ventilated by mask (1)  Airway Type: Cuffed  Airway Tube Size: 8 mm  Location: Oral  Insertion attempts: 1  Secured At: 25 cm  M...   Secured At 25 cm   Measured From Lips   Secured By Commercial tube hinton   Site Assessment Dry

## 2025-06-02 NOTE — PROGRESS NOTES
06/02/25 0858   Patient Observation   Pulse 60   Respirations 20   SpO2 98 %   Vent Information   Equipment Changed Expiratory Filter   Vent Mode (S)  CPAP/PS   Ventilator Settings   PEEP/CPAP (cmH2O) 5   FiO2  35 %   Pressure Support (cm H2O) 18 cm H2O   Vent Patient Data (Readings)   Vt (Measured) 357 mL   Peak Inspiratory Pressure (cmH2O) 24 cmH2O   Rate Measured 20 br/min   Minute Volume (L/min) 7.18 Liters   Mean Airway Pressure (cmH2O) 9 cmH20   Plateau Pressure (cm H2O) 23 cm H2O   Driving Pressure 18   I:E Ratio 1:4.30   Flow Sensitivity 3 L/min   Static Compliance (L/cm H2O) 24   Backup Apnea On   Backup Rate 14 Breaths Per Minute   Backup Vt 450   Vent Alarm Settings   High Pressure (cmH2O) 45 cmH2O   Low Minute Volume (lpm) 4 L/min   High Minute Volume (lpm) 15 L/min   Low Exhaled Vt (ml) 350 mL   High Exhaled Vt (ml) 800 mL   RR High (bpm) 35 br/min   Apnea (secs) 20 secs   Additional Respiratoray Assessments   Humidification Source Heated wire   Humidification Temp 37   Circuit Condensation Drained   Ambu Bag With Mask At Bedside Yes   ETT    Placement Date/Time: 05/23/25 1513   Placement Verified By: Capnometry;Chest X-ray  Preoxygenation: Yes  Mask Ventilation: Ventilated by mask (1)  Airway Type: Cuffed  Airway Tube Size: 8 mm  Location: Oral  Insertion attempts: 1  Secured At: 25 cm  M...   Secured At 25 cm   Measured From Lips   Secured By Commercial tube hinton   Site Assessment Dry     Respiratory Wean Start time:  Order to wean ventilator Yes (if no, contact provider)    Patient placed on PS of 18, PEEP 5 cmH2O, FIO2 35 at 0858AM.    HR 60 bpm  RR 20 bpm  SPO2 98%      Will continue to attempt to wean based on patient tolerance.       Performed by  Patience Tabor RCP RRT

## 2025-06-02 NOTE — PLAN OF CARE
Problem: Chronic Conditions and Co-morbidities  Goal: Patient's chronic conditions and co-morbidity symptoms are monitored and maintained or improved  6/2/2025 0101 by Michi Girard RN  Outcome: Progressing  6/1/2025 2150 by Penny Ralph RN  Outcome: Progressing     Problem: Discharge Planning  Goal: Discharge to home or other facility with appropriate resources  Outcome: Progressing     Problem: Safety - Adult  Goal: Free from fall injury  6/2/2025 0101 by Michi Girard RN  Outcome: Progressing  6/1/2025 2150 by Penny Ralph RN  Outcome: Progressing     Problem: Pain  Goal: Verbalizes/displays adequate comfort level or baseline comfort level  6/2/2025 0101 by Michi Girard RN  Outcome: Progressing  6/1/2025 2150 by Penny Ralph RN  Outcome: Progressing     Problem: Safety - Medical Restraint  Goal: Remains free of injury from restraints (Restraint for Interference with Medical Device)  Description: INTERVENTIONS:1. Determine that other, less restrictive measures have been tried or would not be effective before applying the restraint2. Evaluate the patient's condition at the time of restraint application3. Inform patient/family regarding the reason for restraint4. Q2H: Monitor safety, psychosocial status, comfort, nutrition and hydration  6/2/2025 0101 by Michi Girard RN  Outcome: Progressing  Flowsheets  Taken 6/2/2025 0000 by Michi Girard RN  Remains free of injury from restraints (restraint for interference with medical device):   Determine that other, less restrictive measures have been tried or would not be effective before applying the restraint   Evaluate the patient's condition at the time of restraint application   Inform patient/family regarding the reason for restraint   Every 2 hours: Monitor safety, psychosocial status, comfort, nutrition and hydration  Taken 6/1/2025 2200 by Penny Ralph RN  Remains free of injury from restraints  Skin integrity remains intact  Description: 1.  Monitor for areas of redness and/or skin breakdown2.  Assess vascular access sites hourly3.  Every 4-6 hours minimum:  Change oxygen saturation probe site4.  Every 4-6 hours:  If on nasal continuous positive airway pressure, respiratory therapy assess nares and determine need for appliance change or resting period  6/2/2025 0101 by Michi Girard RN  Outcome: Progressing  6/1/2025 2150 by Penny Ralph RN  Outcome: Progressing     Problem: Nutrition Deficit:  Goal: Optimize nutritional status  6/2/2025 0101 by Michi Girard RN  Outcome: Progressing  6/1/2025 2150 by Penny Ralph RN  Outcome: Not Progressing     Problem: Neurosensory - Adult  Goal: Achieves stable or improved neurological status  6/2/2025 0101 by Michi Girard RN  Outcome: Progressing  6/1/2025 2150 by Penny Ralph RN  Outcome: Progressing  Goal: Absence of seizures  6/2/2025 0101 by Michi Girard RN  Outcome: Progressing  6/1/2025 2150 by Penny Ralph RN  Outcome: Progressing     Problem: Respiratory - Adult  Goal: Achieves optimal ventilation and oxygenation  6/2/2025 0101 by Michi Girard RN  Outcome: Progressing  6/1/2025 2150 by Penny Ralph RN  Outcome: Not Progressing  6/1/2025 1345 by Lin Zhao RCP  Outcome: Not Progressing     Problem: Skin/Tissue Integrity - Adult  Goal: Skin integrity remains intact  Description: 1.  Monitor for areas of redness and/or skin breakdown2.  Assess vascular access sites hourly3.  Every 4-6 hours minimum:  Change oxygen saturation probe site4.  Every 4-6 hours:  If on nasal continuous positive airway pressure, respiratory therapy assess nares and determine need for appliance change or resting period  6/2/2025 0101 by Michi Girard RN  Outcome: Progressing  6/1/2025 2150 by Penny Ralph RN  Outcome: Progressing     Problem: Gastrointestinal - Adult  Goal: Minimal or absence of

## 2025-06-03 NOTE — PLAN OF CARE
Patient in bilateral wrist restraints, when released reaching for lines and tubes.     Problem: Safety - Adult  Goal: Free from fall injury  6/3/2025 1058 by Claudio Sanchez RN  Outcome: Progressing  6/3/2025 0259 by Penny Ralph RN  Outcome: Progressing     Problem: Pain  Goal: Verbalizes/displays adequate comfort level or baseline comfort level  6/3/2025 1058 by Claudio Sanchez RN  Outcome: Progressing  6/3/2025 0259 by Penny Ralph RN  Outcome: Progressing     Problem: Safety - Medical Restraint  Goal: Remains free of injury from restraints (Restraint for Interference with Medical Device)  Description: INTERVENTIONS:1. Determine that other, less restrictive measures have been tried or would not be effective before applying the restraint2. Evaluate the patient's condition at the time of restraint application3. Inform patient/family regarding the reason for restraint4. Q2H: Monitor safety, psychosocial status, comfort, nutrition and hydration  6/3/2025 1058 by Claudio Sanchez RN  Outcome: Progressing  Flowsheets  Taken 6/3/2025 0600 by Penny Ralph RN  Remains free of injury from restraints (restraint for interference with medical device):   Determine that other, less restrictive measures have been tried or would not be effective before applying the restraint   Evaluate the patient's condition at the time of restraint application   Inform patient/family regarding the reason for restraint   Every 2 hours: Monitor safety, psychosocial status, comfort, nutrition and hydration  Taken 6/3/2025 0400 by Penny Ralph RN  Remains free of injury from restraints (restraint for interference with medical device):   Determine that other, less restrictive measures have been tried or would not be effective before applying the restraint   Evaluate the patient's condition at the time of restraint application   Inform patient/family regarding the reason for restraint   Every 2 hours: Monitor  Adult  Goal: Urinary catheter remains patent  Outcome: Progressing     Problem: Infection - Adult  Goal: Absence of infection during hospitalization  Outcome: Progressing     Problem: Confusion  Goal: Confusion, delirium, dementia, or psychosis is improved or at baseline  Description: INTERVENTIONS:1. Assess for possible contributors to thought disturbance, including medications, impaired vision or hearing, underlying metabolic abnormalities, dehydration, psychiatric diagnoses, and notify attending LIP2. [place] high risk fall precautions, as indicated3. Provide frequent short contacts to provide reality reorientation, refocusing and direction4. Decrease environmental stimuli, including noise as appropriate5. Monitor and intervene to maintain adequate nutrition, hydration, elimination, sleep and activity6. If unable to ensure safety without constant attention obtain sitter and review sitter guidelines with assigned personnel7. Initiate Psychosocial CNS and Spiritual Care consult, as indicated  INTERVENTIONS:1. Assess for possible contributors to thought disturbance, including medications, impaired vision or hearing, underlying metabolic abnormalities, dehydration, psychiatric diagnoses, and notify attending LIP2. [place] high risk fall precautions, as indicated3. Provide frequent short contacts to provide reality reorientation, refocusing and direction4. Decrease environmental stimuli, including noise as appropriate5. Monitor and intervene to maintain adequate nutrition, hydration, elimination, sleep and activity6. If unable to ensure safety without constant attention obtain sitter and review sitter guidelines with assigned personnel7. Initiate Psychosocial CNS and Spiritual Care consult, as indicated  INTERVENTIONS:1. Assess for possible contributors to thought disturbance, including medications, impaired vision or hearing, underlying metabolic abnormalities, dehydration, psychiatric diagnoses, and notify  resources  Outcome: Not Progressing     Problem: Nutrition Deficit:  Goal: Optimize nutritional status  6/3/2025 1058 by Claudio Sanchez, RN  Outcome: Not Progressing  6/3/2025 0259 by Penny Ralph, RN  Outcome: Not Progressing     Problem: Metabolic/Fluid and Electrolytes - Adult  Goal: Electrolytes maintained within normal limits  Outcome: Not Progressing

## 2025-06-03 NOTE — PROGRESS NOTES
Gastroenterology, Hepatology, &  Advanced Endoscopy    Progress Note      Reason for Consult: GI bleeding, hx alcoholic cirrhosis, ? Need for a scope     HPI:   Len Dorsey is a 59 y.o. male w/ PMH of  has a past medical history of Alcoholic liver disease, H/O prosthetic aortic valve replacement, History of mitral valve replacement, Stroke (HCC), and Testicular cancer (HCC). who presents to the ED due to failure to thrive. Patient has a history of alcoholic cirrhosis as well. His hospital course was complicated with upper GI bleeding and acute hypoxic respiratory failure. He was sent to the MICU from the ED and then transferred out of the MICU on 5/22, he was transferred back on 5/23 and intubated there. Patient has been intubated for 9 days.       PMH:       Diagnosis Date    Alcoholic liver disease     H/O prosthetic aortic valve replacement 09/2020    [facility]  Mitral and pacemaker at this time as well    History of mitral valve replacement 09/2020    at [facility]  Aortic Valve placed at this time as well as pacemaker    Stroke (HCC)     Testicular cancer (HCC)     in remission since 1988        PSH:       Procedure Laterality Date    COLONOSCOPY N/A 8/24/2022    COLONOSCOPY POLYPECTOMY HOT BIOPSY performed by Rene Lorenzo MD at [facility]    FRACTURE SURGERY      Left Tibial Plateau Fracture 3/29/2022     PACEMAKER PLACEMENT  09/2020    UPPER GASTROINTESTINAL ENDOSCOPY N/A 8/24/2022    EGD DIAGNOSTIC ONLY performed by Rene Lorenzo MD at [facility]    UPPER GASTROINTESTINAL ENDOSCOPY N/A 5/9/2024    ESOPHAGOGASTRODUODENOSCOPY          +++AVAIL 1430+++ performed by Min Mcdaniels DO at [facility] ENDOSCOPY    UPPER GASTROINTESTINAL ENDOSCOPY N/A 2/28/2025    ESOPHAGOGASTRODUODENOSCOPY BIOPSY performed by Herbert Wells MD at [facility]        Family History: History reviewed. No pertinent family history.     Social History:   Social History     Tobacco Use    Smoking status: Every  supratherapeutic INR.  - PPI BID.  - No plans for repeat endoscopic evaluation.   - Will need improved plans for monitoring INR on outpatient basis.   - Monitor for francisco signs of bleeding  -Transfuse to keep Hgb> 7.0, Hgb 7.8->8.1->7.9  -Paracentesis PRN      Thank you for including us in the care of this patient. Please do not hesitate to reach out with any questions.    Greater than or equal to 25 minutes was spent providing face-to-face patient care, including:  and coordinating care, reviewing the chart, labs, and diagnostics, as well as medical decision making. Greater than 50% of this time was spent instructing and counseling the patient face to face regarding findings and recommendations.     Discussed with Dr. JAIMES  Plan per Dr. THEODORE Kurtz, MSN,CRNP 6/3/2025 9:03 AM For Dr. JAIMES

## 2025-06-03 NOTE — PROGRESS NOTES
[facility]  Department of Internal Medicine   Internal Medicine Residency   MICU Progress Note    Patient:  Len Dorsey 59 y.o. male  MRN: [id number]     Date of Service: 6/2/2025    Allergy: Patient has no known allergies.    Subjective     The patient was seen and examined at bedside. He remains to be intubated. He was awake and following commands.    Objective     VS: BP (!) 115/51   Pulse 72   Temp 99.3 °F (37.4 °C) (Esophageal)   Resp 25   Ht 1.778 m (5' 10\")   Wt 122 kg (268 lb 15.4 oz)   SpO2 100%   BMI 38.59 kg/m²   ABP (Arterial line BP): 115/51  ABP Mean (Arterial Line Mean): 70 mmHg    I & O - 24hr:   Intake/Output Summary (Last 24 hours) at 6/2/2025 2226  Last data filed at 6/2/2025 1800  Gross per 24 hour   Intake 3566.84 ml   Output 2065 ml   Net 1501.84 ml       Physical Exam:  General Appearance: intubated  Neck: no adenopathy, no carotid bruit, no JVD, supple, symmetrical, trachea midline, and thyroid not enlarged, symmetric, no tenderness/mass/nodules  Lung:  diminished breath sounds on the left  Heart: regularly irregular rhythm  Abdomen:  firm, mildly tender  Extremities:   trace edema of bilateral lower extremities  Musculoskeletal: No joint swelling, no muscle tenderness. ROM normal in all joints of extremities.   Neurologic: Sedated. Awake, alert, following commands.    Lines     site day    Art line   None    TLC R IJ 5/28/2025   PICC None    Hemoaccess None      Mechanical Ventilation:  Mode: A/C   TV: 450 ml RR: 14  PEEP: 5 cmH2O FiO2: 35    ABG:     Lab Results   Component Value Date/Time    PH 7.483 06/02/2025 04:47 AM    PCO2 31.0 06/02/2025 04:47 AM    PO2 127.6 06/02/2025 04:47 AM    HCO3 22.7 06/02/2025 04:47 AM    BE -0.3 06/02/2025 04:47 AM    THB 8.8 06/02/2025 04:47 AM    O2SAT 98.7 06/02/2025 04:47 AM        Medications     Infusions: (Fluid, Sedation, Vasopressors)  Heparin  Precedex    Nutrition:   NPO    ATB:   Antibiotics  Days   Micafungin 8

## 2025-06-03 NOTE — PROGRESS NOTES
Spiritual Health History and Assessment/Progress Note  [facility]    (P) Palliative Care, Spiritual/Emotional Needs,  ,  ,      Name: Len Dorsey MRN: [id number]    Age: 59 y.o.     Sex: male   Language: English   Latter-day: Jew   Supratherapeutic INR     Date: 6/3/2025                           Spiritual Assessment began in [facility] MICU        Referral/Consult From: (P) Palliative Care, Rounding   Encounter Overview/Reason: (P) Palliative Care, Spiritual/Emotional Needs  Service Provided For: (P) Patient and family together    Fide, Belief, Meaning:   Patient identifies as spiritual, is connected with a fide tradition or spiritual practice, and has beliefs or practices that help with coping during difficult times  Family/Friends identify as spiritual, are connected with a fide tradition or spiritual practice, and have beliefs or practices that help with coping during difficult times      Importance and Influence:  Patient has spiritual/personal beliefs that influence decisions regarding their health  Family/Friends have spiritual/personal beliefs that influence decisions regarding the patient's health    Community:  Patient is connected with a spiritual community and feels well-supported. Support system includes: Spouse/Partner, Children, Fide Community, and Extended family  Family/Friends feel well-supported. Support system includes: Spouse/Partner, Children, Extended family, and Other: The patient's finacn e stated this is the first time she has prayed in 25 years.    Assessment and Plan of Care:     Patient Interventions include: Facilitated expression of thoughts and feelings, Explored spiritual coping/struggle/distress, Engaged in theological reflection, and Affirmed coping skills/support systems  Family/Friends Interventions include: Facilitated expression of thoughts and feelings, Explored spiritual coping/struggle/distress, Engaged in theological reflection, and Affirmed coping  skills/support systems    Patient Plan of Care: Other: The  will continue to follow as needed.  Family/Friends Plan of Care: Other: The  will continue to follow as needed.    Electronically signed by Chaplain Lore on 6/3/2025 at 7:45 PM

## 2025-06-03 NOTE — PLAN OF CARE
Problem: Safety - Medical Restraint  Goal: Remains free of injury from restraints (Restraint for Interference with Medical Device)  Description: INTERVENTIONS:1. Determine that other, less restrictive measures have been tried or would not be effective before applying the restraint2. Evaluate the patient's condition at the time of restraint application3. Inform patient/family regarding the reason for restraint4. Q2H: Monitor safety, psychosocial status, comfort, nutrition and hydration  6/3/2025 1701 by Claudio Sanchez RN  Outcome: Completed  6/3/2025 1058 by Claudio Sanchez RN  Outcome: Progressing  Flowsheets  Taken 6/3/2025 0600 by Penny Ralph RN  Remains free of injury from restraints (restraint for interference with medical device):   Determine that other, less restrictive measures have been tried or would not be effective before applying the restraint   Evaluate the patient's condition at the time of restraint application   Inform patient/family regarding the reason for restraint   Every 2 hours: Monitor safety, psychosocial status, comfort, nutrition and hydration  Taken 6/3/2025 0400 by Penny Ralph RN  Remains free of injury from restraints (restraint for interference with medical device):   Determine that other, less restrictive measures have been tried or would not be effective before applying the restraint   Evaluate the patient's condition at the time of restraint application   Inform patient/family regarding the reason for restraint   Every 2 hours: Monitor safety, psychosocial status, comfort, nutrition and hydration

## 2025-06-03 NOTE — PLAN OF CARE
Patient is Bilateral wrist restraints. When released pulls at lines and tubes.  Problem: Nutrition Deficit:  Goal: Optimize nutritional status  Outcome: Not Progressing     Problem: Chronic Conditions and Co-morbidities  Goal: Patient's chronic conditions and co-morbidity symptoms are monitored and maintained or improved  Outcome: Progressing     Problem: Safety - Adult  Goal: Free from fall injury  Outcome: Progressing     Problem: Pain  Goal: Verbalizes/displays adequate comfort level or baseline comfort level  Outcome: Progressing     Problem: Safety - Medical Restraint  Goal: Remains free of injury from restraints (Restraint for Interference with Medical Device)  Description: INTERVENTIONS:1. Determine that other, less restrictive measures have been tried or would not be effective before applying the restraint2. Evaluate the patient's condition at the time of restraint application3. Inform patient/family regarding the reason for restraint4. Q2H: Monitor safety, psychosocial status, comfort, nutrition and hydration  Outcome: Progressing  Flowsheets  Taken 6/3/2025 0200  Remains free of injury from restraints (restraint for interference with medical device):   Determine that other, less restrictive measures have been tried or would not be effective before applying the restraint   Evaluate the patient's condition at the time of restraint application   Inform patient/family regarding the reason for restraint   Every 2 hours: Monitor safety, psychosocial status, comfort, nutrition and hydration  Taken 6/3/2025 0000  Remains free of injury from restraints (restraint for interference with medical device):   Determine that other, less restrictive measures have been tried or would not be effective before applying the restraint   Evaluate the patient's condition at the time of restraint application   Inform patient/family regarding the reason for restraint   Every 2 hours: Monitor safety, psychosocial status, comfort,  respiratory therapy assess nares and determine need for appliance change or resting period  Outcome: Progressing     Problem: Gastrointestinal - Adult  Goal: Minimal or absence of nausea and vomiting  Outcome: Progressing  Goal: Maintains adequate nutritional intake  Outcome: Progressing     Problem: ABCDS Injury Assessment  Goal: Absence of physical injury  Outcome: Progressing     Problem: Nutrition Deficit:  Goal: Optimize nutritional status  Outcome: Not Progressing

## 2025-06-03 NOTE — PROGRESS NOTES
Cuff leak preformed yes.      Patient was extubated to 4 liters/min via nasal cannula. Breath Sounds post extubation were clear/diminished. Stridor was not present post extubation. SPO2 was 98%.      Performed by  Kelin Morgan RCP

## 2025-06-03 NOTE — PROGRESS NOTES
06/02/25 2028   Patient Observation   Pulse 72   Respirations 25   SpO2 100 %   Vent Information   Vent Mode AC/VC  (Patient back on AC/VC to rest for the night.)   Ventilator Settings   Vt (Set, mL) 450 mL   Resp Rate (Set) 14 bpm   PEEP/CPAP (cmH2O) 5   FiO2  35 %   Peak Inspiratory Flow (Set) 60 L/sec   Vent Patient Data (Readings)   Vt (Measured) 471 mL   Peak Inspiratory Pressure (cmH2O) 25 cmH2O   Rate Measured 25 br/min   Minute Volume (L/min) 11.8 Liters   Peak Inspiratory Flow (lpm) 60 L/sec   Mean Airway Pressure (cmH2O) 13 cmH20   Plateau Pressure (cm H2O) 23 cm H2O   Driving Pressure 18   I:E Ratio 1:2.10   Flow Sensitivity 3 L/min   Backup Apnea On   Backup Rate 14 Breaths Per Minute   Backup Vt 450   Vent Alarm Settings   High Pressure (cmH2O) 45 cmH2O   Low Minute Volume (lpm) 4 L/min   High Minute Volume (lpm) 15 L/min   Low Exhaled Vt (ml) 350 mL   High Exhaled Vt (ml) 800 mL   RR High (bpm) 35 br/min   Apnea (secs) 20 secs   Additional Respiratoray Assessments   Humidification Source Heated wire   Humidification Temp 37   Circuit Condensation Not drained   Ambu Bag With Mask At Bedside Yes   Airway Clearance   Suction ET Tube   Suction Device Inline suction catheter   Sputum Method Obtained Endotracheal   Sputum Amount Small   Sputum Color/Odor Clear   Sputum Consistency Thin   ETT    Placement Date/Time: 05/23/25 1513   Placement Verified By: Capnometry;Chest X-ray  Preoxygenation: Yes  Mask Ventilation: Ventilated by mask (1)  Airway Type: Cuffed  Airway Tube Size: 8 mm  Location: Oral  Insertion attempts: 1  Secured At: 25 cm  M...   Secured At 25 cm   Measured From Lips   Secured By Commercial tube hinton   Site Assessment Dry     End of Weaning attempt:     beginning at 0858 and ending at 2031.     HR 72 bpm  RR 25 bpm  SPO2 100%    Patience Tabor RCP RRT

## 2025-06-03 NOTE — PROGRESS NOTES
Department of Internal Medicine  Infectious Diseases  Progress Note      C/C:  Resp failure, D glucan antigenemia     Pt is intubated  Awake ,alert  Denies fever       Current Facility-Administered Medications   Medication Dose Route Frequency Provider Last Rate Last Admin    [Held by provider] dextrose 5 % solution   IntraVENous Continuous Taiwo Sullivan MD   Stopped at 06/03/25 0327    folic acid injection 1 mg  1 mg IntraVENous Daily PankajEladio Riveraa, DO   1 mg at 06/03/25 0910    heparin 25,000 units in dextrose 5% 250 mL (premix) infusion  5-30 Units/kg/hr IntraVENous Continuous Caitlyn Sagastume MD 14.6 mL/hr at 06/03/25 0905 12 Units/kg/hr at 06/03/25 0905    heparin (porcine) injection 4,000 Units  4,000 Units IntraVENous PRN Caitlyn Sagastume MD        heparin (porcine) injection 2,000 Units  2,000 Units IntraVENous PRN Caitlyn Sagastume MD   2,000 Units at 06/01/25 1451    micafungin (MYCAMINE) 100 mg in sodium chloride 0.9 % 100 mL IVPB ([id number])  100 mg IntraVENous Daily Mikael Redding  mL/hr at 06/03/25 0913 100 mg at 06/03/25 0913    [Held by provider] midodrine (PROAMATINE) tablet 20 mg  20 mg Oral TID WC Angus Davenport MD   20 mg at 05/30/25 1718    levETIRAcetam (KEPPRA) injection 250 mg  250 mg IntraVENous BID Jose Alberto Bourgeois MD   250 mg at 06/03/25 0905    vitamin B-6 (PYRIDOXINE) tablet 50 mg  50 mg Oral Daily Jose Alberto Bourgeois MD        white petrolatum ointment   Topical BID Caitlyn Sagastume MD   Given at 06/03/25 0906    dexmedeTOMIDine (PRECEDEX) 1,000 mcg in sodium chloride 0.9 % 250 mL infusion  0.1-1.5 mcg/kg/hr IntraVENous Continuous Angus Davenport MD 12.2 mL/hr at 06/03/25 0903 0.4 mcg/kg/hr at 06/03/25 0903    midazolam PF (VERSED) injection 2 mg  2 mg IntraVENous Q4H PRN Caitlyn Sagastume MD   2 mg at 05/28/25 2032    ipratropium 0.5 mg-albuterol 2.5 mg (DUONEB) nebulizer solution 1 Dose  1 Dose Inhalation Q4H WA RT Caitlyn Sagastume MD   1  ARNALDO Ferrer CNP        [Held by provider] melatonin tablet 3 mg  3 mg Oral Nightly PRN Carissa Good APRN - CNP        ondansetron (ZOFRAN-ODT) disintegrating tablet 4 mg  4 mg Oral Q8H PRN Len Arguello DO        Or    ondansetron (ZOFRAN) injection 4 mg  4 mg IntraVENous Q6H PRN Len Arguello DO        polyethylene glycol (GLYCOLAX) packet 17 g  17 g Oral Daily PRN Len Arguello DO   17 g at 05/21/25 0853    pantoprazole (PROTONIX) injection 40 mg  40 mg IntraVENous BID Caitlyn Sagastume MD   40 mg at 06/03/25 0905    glucose chewable tablet 16 g  4 tablet Oral PRN Caitlyn Sagastume MD        dextrose bolus 10% 125 mL  125 mL IntraVENous PRN Caitlyn Sagastume MD   Stopped at 05/20/25 0005    Or    dextrose bolus 10% 250 mL  250 mL IntraVENous PRN Caitlyn Sagastume MD        glucagon injection 1 mg  1 mg SubCUTAneous PRN Caitlyn Sagastume MD        dextrose 10 % infusion   IntraVENous Continuous PRN Caitlyn Sagastume MD           PHYSICAL EXAM:      Vitals:     BP (!) 93/51   Pulse 74   Temp 98.6 °F (37 °C) (Esophageal)   Resp 14   Ht 1.778 m (5' 10\")   Wt 122 kg (268 lb 15.4 oz)   SpO2 100%   BMI 38.59 kg/m²     General Appearance:    Intubated, awake and alert    Head:    Normocephalic, atraumatic   Eyes:    No pallor, no icterus,   Ears:    No obvious deformity or drainage.   Nose:   No nasal drainage   Throat:   Mucosa dry   Neck:   Supple, no lymphadenopathy   Lungs:     Clear     Heart:    Regular rate ,systolic murmur   Abdomen:     Soft, non-tender, bowel sounds present    Extremities:   + edema    Pulses:   Dorsalis pedis palpable      CBC with Differential:      Lab Results   Component Value Date/Time    WBC 6.8 06/03/2025 04:44 AM    RBC 2.91 06/03/2025 04:44 AM    HGB 7.9 06/03/2025 04:44 AM    HCT 26.2 06/03/2025 04:44 AM     05/25/2025 04:06 AM    MCV 90.0 06/03/2025 04:44 AM    MCH 27.1 06/03/2025 04:44 AM    MCHC 30.2 06/03/2025

## 2025-06-03 NOTE — PROGRESS NOTES
Department of Internal Medicine  Nephrology Attending Consult Note    Events reviewed    SUBJECTIVE: We are following Mr. Dorsey for FRANTZ on CKD.  Presently intubated    PHYSICAL EXAM:      Vitals:    VITALS:  BP (!) 93/51   Pulse 74   Temp 98.6 °F (37 °C) (Esophageal)   Resp 14   Ht 1.778 m (5' 10\")   Wt 122 kg (268 lb 15.4 oz)   SpO2 100%   BMI 38.59 kg/m²   24HR INTAKE/OUTPUT:    Intake/Output Summary (Last 24 hours) at 6/3/2025 1016  Last data filed at 6/3/2025 0700  Gross per 24 hour   Intake 3566.84 ml   Output 1775 ml   Net 1791.84 ml       Constitutional: Patient is presently intubated, sedated  HEENT: Pupils equal reactive, endotracheal tube in place  Respiratory: Decreased breath sounds at the bases  Cardiovascular/Edema: Heart sounds are regular  Gastrointestinal: Abdomen soft  Neurologic: Unable to level  Skin: No lesion  Other: + Edema    Scheduled Meds:   folic acid  1 mg IntraVENous Daily    micafungin  100 mg IntraVENous Daily    [Held by provider] midodrine  20 mg Oral TID WC    levETIRAcetam  250 mg IntraVENous BID    vitamin B-6  50 mg Oral Daily    white petrolatum   Topical BID    ipratropium 0.5 mg-albuterol 2.5 mg  1 Dose Inhalation Q4H WA RT    budesonide  0.5 mg Nebulization BID RT    arformoterol tartrate  15 mcg Nebulization BID RT    [Held by provider] bumetanide  1 mg Oral Daily    chlorhexidine  15 mL Mouth/Throat BID    thiamine  100 mg IntraVENous Daily    [Held by provider] lactulose  20 g Oral TID    atorvastatin  20 mg Oral Nightly    [Held by provider] rifAXIMin  400 mg Oral TID    [Held by provider] busPIRone  5 mg Oral BID    sodium chloride flush  5-40 mL IntraVENous 2 times per day    multivitamin  1 tablet Oral Daily    [Held by provider] folic acid  1 mg Oral Daily    pantoprazole  40 mg IntraVENous BID     Continuous Infusions:   [Held by provider] dextrose Stopped (06/03/25 0327)    heparin (PORCINE) Infusion 12 Units/kg/hr (06/03/25 0905)    dexmedeTOMIDine

## 2025-06-03 NOTE — PROGRESS NOTES
[facility] Hospitalist Progress Note    Admitting Date and Time: 5/19/2025 10:40 AM  Admit Dx: Failure to thrive in adult [R62.7]  FRANTZ (acute kidney injury) [N17.9]  Supratherapeutic INR [R79.1]  Anticoagulated on Coumadin [Z79.01]  Alcohol withdrawal syndrome without complication (HCC) [F10.510]    Synopsis: Patient presented to the emergency department with concerns about not being able to take care of himself.  Patient apparently been canceled doctors appointments and not taking medications.  Has a history of alcohol abuse and is a daily drinker.  Patient states not taking his medications and he ran out of them.  He is not going to doctors appointments because he cannot get a hold of the doctor.  Also reported some shortness of breath.  Denies any fever, chills, nausea, vomiting, chest pain, headache, dizziness, abdominal pain, hematuria.,  Dysuria or constipation or diarrhea.  Vital signs within normal limits and stable.  Although reviewing his vital signs throughout his ER course he did drop to 85% on room air.  He is currently 99% on 3 L nasal cannula.  Laboratory studies demonstrate BUN 22, creatinine 2.4, procalcitonin 0.19, troponin 74 with repeat of 67, proBNP 5566, alk phos 259, hemoglobin 8.3 and a WBC of 12.3.  INR is greater than 10.  CT imaging shows some increased third spacing of fluid.  Chest x-ray shows bibasilar opacities.  Patient was started on ceftriaxone and doxycycline for presumed pneumonia.  He was also noted to have seizure-like activity for which she was given Ativan.  He was also placed on CIWA protocol.  Patient be admitted to the ICU.  Patient continues to require pressors.  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

## 2025-06-03 NOTE — PROGRESS NOTES
Patient placed in CPAP/PS 8 FiO2 35% PEEP +5 at this time. SpO2 93%.     06/03/25 1217   Patient Observation   Pulse 62   Respirations 19   SpO2 92 %   Vent Information   Ventilator ID [id number]   Vent Mode (S)  CPAP/PS   Ventilator Settings   PEEP/CPAP (cmH2O) 5   FiO2  35 %   Pressure Support (cm H2O) (S)  8 cm H2O   Vent Patient Data (Readings)   Vt (Measured) 254 mL   Peak Inspiratory Pressure (cmH2O) 14 cmH2O   Rate Measured 22 br/min   Minute Volume (L/min) 5.88 Liters   Mean Airway Pressure (cmH2O) 8 cmH20   Plateau Pressure (cm H2O) 25 cm H2O   Driving Pressure 20   I:E Ratio 1:1.90   Flow Sensitivity 3 L/min   Backup Apnea On   Backup Rate 14 Breaths Per Minute   Backup Vt 450   Backup I Time 20   Vent Alarm Settings   High Pressure (cmH2O) 45 cmH2O   Low Minute Volume (lpm) 4.5 L/min   High Minute Volume (lpm) 15 L/min   Low Exhaled Vt (ml) 300 mL   High Exhaled Vt (ml) 800 mL   RR High (bpm) 35 br/min   Apnea (secs) 20 secs

## 2025-06-03 NOTE — PROGRESS NOTES
Spontaneous Parameters performed CPAP/PS 5 PEEP +5 FiO2 35%    VT = 388 ml  f = 24  B/M  Ve = 7.89 L/M  NIF = -20  cmH2O  VC = 446 L  RSBI = 71    Minimal leak test performed; audible cuff leak heard when ETT cuff deflated. Cuff-inflated after test completed.     Performed by Kelin Morgan RCP

## 2025-06-04 NOTE — PROGRESS NOTES
The  visited the patient. The patient was looking for his wife to come. I reassured him that she was probably at work. She shared that  she left early the day before so that she could be with him after she was informed that he was intubated and was now talking. The patient spoke about how he could not remember much from the time when he was extremely ill. He discussed his appreciation to God and the importance of having steve.  The  provided active listening, sustaining ministry presence and prayer. If additional support is needed please reach out to [facility] (ext [phone]).    Rev MICHELE Pena MDIV,

## 2025-06-04 NOTE — CARE COORDINATION
Care Coordination: LOS 16 day.  Call to BJ[facility] Hadley. She visited yesterday and is concerned regarding plan for home at discharge. Recent CAR at [facility] and she states they sent him home without hhc.  She would like referral made to [facility] and she will discuss further with pt.  Reviewed evals with her as well. Referral placed in careport    Electronically signed by Laquita Goodwin RN on 6/4/2025 at 1:12 PM     ADDENDUM: Call from Erin at [facility], They can accept back, only if pt pays copay upfront, which would be 206.00 per day as he is his copay days. I left a brief message to POA, as it is a non-personalized message. Pt is confused and no family members at bedside    Electronically signed by Laquita Goodwin RN on 6/4/2025 at 3:55 PM     ADDENDUM: Call back from Leonie, she cannot afford copays, states they do not have money to cover. I will try back door to select, but pt as [facility] medicare. She would like c, has no preference. Would like, ptx, otx sn, speech, aid and sw.  She has a new pcp apt scheduled for July 9 with dr Angelic love, will check to make referral to [facility] and see if they can cover orders until pt establishes with pcp and also try to move apt up once tentative dc day.    Electronically signed by Laquita Goodwin RN on 6/4/2025 at 4:12 PM

## 2025-06-04 NOTE — PROGRESS NOTES
[facility] Hospitalist Progress Note    Admitting Date and Time: 5/19/2025 10:40 AM  Admit Dx: Failure to thrive in adult [R62.7]  FRANTZ (acute kidney injury) [N17.9]  Supratherapeutic INR [R79.1]  Anticoagulated on Coumadin [Z79.01]  Alcohol withdrawal syndrome without complication (HCC) [F10.410]    Synopsis: Patient presented to the emergency department with concerns about not being able to take care of himself.  Patient apparently been canceled doctors appointments and not taking medications.  Has a history of alcohol abuse and is a daily drinker.  Patient states not taking his medications and he ran out of them.  He is not going to doctors appointments because he cannot get a hold of the doctor.  Also reported some shortness of breath.  Denies any fever, chills, nausea, vomiting, chest pain, headache, dizziness, abdominal pain, hematuria.,  Dysuria or constipation or diarrhea.  Vital signs within normal limits and stable.  Although reviewing his vital signs throughout his ER course he did drop to 85% on room air.  He is currently 99% on 3 L nasal cannula.  Laboratory studies demonstrate BUN 22, creatinine 2.4, procalcitonin 0.19, troponin 74 with repeat of 67, proBNP 5566, alk phos 259, hemoglobin 8.3 and a WBC of 12.3.  INR is greater than 10.  CT imaging shows some increased third spacing of fluid.  Chest x-ray shows bibasilar opacities.  Patient was started on ceftriaxone and doxycycline for presumed pneumonia.  He was also noted to have seizure-like activity for which she was given Ativan.  He was also placed on CIWA protocol.  Patient be admitted to the ICU.  Patient continues to require pressors.  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    5/27 restarted on levophed, bumex held   5/28 - on Levophed, midodrine increased   5/30 - continues on Levophed, intermittently following commands, midodrine increased   5/31 - Bleeding through NG tube, INR  5/31      Plan:  Transferred out of the ICU on 5/22, transferred back to the ICU 5/23, intubated and sedated, extubated 6/30  Neurology consulted, on Keppra, high-dose thiamine, CIWA protocol, unable to have MRI, seizure precautions. EEG completed 5/23- moderate nonspecific encephalopathy, No seizures or epileptiform discharges were noted during this study.  Currently on Keppra  Nephrology following for FRANTZ on CKD, off bumex   Currently on levophed, started on midodrine  Status post vitamin K, monitoring PT/INR, pharmacy dosing warfarin. - held due to GI bleeding, currently on heparin drip   GI consulted, serial H&H, no plans for EGD    On lactulose, rifaximin   N.p.o., TF  On D5 infusion   ID following, off aztreonam currently on micafungin   Monitor LFT's, right upper quadrant ultrasound with cholelithiasis, no signs of cholecystitis, steatosis.  Continue Lactulose and Rifaximin.       Code Status: Full Code  DVT/GI Prophylaxis: Heparin     Dispo: Pending clinical course.      NOTE: This report was transcribed using voice recognition software. Every effort was made to ensure accuracy; however, inadvertent computerized transcription errors may be present.  Electronically signed by Elsi Weber MD on 6/4/2025 at 9:08 AM

## 2025-06-04 NOTE — PROGRESS NOTES
SPEECH/LANGUAGE PATHOLOGY  CLINICAL ASSESSMENT OF SWALLOWING FUNCTION   and PLAN OF CARE      PATIENT NAME:  Len Dorsey  (male)     MRN:  [id number]    :  1965  (59 y.o.)  STATUS:  Inpatient: Room 4423/4423-A    TODAY'S DATE:  2025  ORDER DATE, DESCRIPTION AND REFERRING PROVIDER: 25 [facility]    SLP swallowing-dysphagia evaluation and treatment  Start:  25,   End:  25,   ONE TIME,   Standing Count:  1 Occurrences,   R    REASON FOR REFERRAL: Dysphagia   EVALUATING THERAPIST: Verito Anna       SUPERVISING THERAPIST: Tiffanie Hargrove M.S.CCC-SLP                   RESULTS:    DYSPHAGIA DIAGNOSIS:   Clinical indicators of moderate pharyngeal phase dysphagia  and unable to rule out silent aspiration bedside      DIET RECOMMENDATIONS:  NPO with ongoing PO analysis by SLP only to determine if PO diet can be initiated         FEEDING RECOMMENDATIONS:     Assistance level:  Not applicable      Compensatory strategies recommended: Thorough oral care      Discussed recommendations with:  Patient  and patient nurse in person    SPEECH THERAPY  PLAN OF CARE   The dysphagia POC is established based on physician order, dysphagia diagnosis and results of clinical assessment     Skilled SLP intervention for dysphagia management on acute care up to 5 x per week until goals met, pt plateaus in function and/or discharged from hospital    Conditions Requiring Skilled Therapeutic Intervention for dysphagia:    Patient is performing below functional baseline d/t  current acute condition, respiratory compromise, multiple medications, and/or increased dependency upon caregivers.  Wet vocal quality during PO intake  Throat clearing during PO intake   patient recently extubated and research indicates edema and reduced sensation s/p extubation increases risk of aspiration  patient unable to self feed which increases risk of aspiration pneumonia (Quentin et al.,1998.)  patient unable to follow 1 step  Gastrointestinal hemorrhage    Polyp of colon    Right upper quadrant abdominal pain    Diarrhea    Current moderate episode of major depressive disorder without prior episode (HCC)    Hypertension    S/P MVR (mitral valve replacement)    S/P AVR    Coronary artery disease involving native coronary artery of native heart without angina pectoris    Paravalvular leak (prosthetic valve)    Hx of CABG    Anticoagulated on Coumadin    Supratherapeutic INR    Moderate obesity    Dyspnea    Alcohol withdrawal syndrome without complication (HCC)    DTs (delirium tremens) (HCC)    Anemia requiring transfusions    Severe protein-calorie malnutrition    Acute decompensated heart failure (HCC)    Aortic valve disease    Mitral valve disease    CHB (complete heart block) (HCC)    Pure hypercholesterolemia    Chronic obstructive pulmonary disease (HCC)    Loculated pleural effusion    Troponin level elevated    Stage 2 chronic kidney disease    Alcohol use    Breast swelling    Pleural effusion    Hypophosphatemia    Acute congestive heart failure (HCC)    FRANTZ (acute kidney injury)    Acidosis    Hyperglycemia    Hyperlipidemia    Alcoholic liver disease, unspecified    Thrombocytopenia    Delirium    Acute blood loss anemia    Heart failure with mid-range ejection fraction (HFmEF) (HCC)    Acute on chronic combined systolic and diastolic congestive heart failure (HCC)    Normally functioning cardiac pacemaker present    Hepatic cirrhosis (HCC)    Acute on chronic systolic (congestive) heart failure (HCC)    Hypoxia    Congestive heart failure (HCC)    Acute hypoxic respiratory failure (HCC)    ETOH abuse    Hypokalemia    PAF (paroxysmal atrial fibrillation) (HCC)    Goals of care, counseling/discussion    Palliative care encounter    Seizures (HCC)    Altered mental status         CPT code:  47126  bedside swallow sandra ELENA  Student Speech Language Pathologist

## 2025-06-04 NOTE — PLAN OF CARE
Problem: Skin/Tissue Integrity  Goal: Skin integrity remains intact  Description: 1.  Monitor for areas of redness and/or skin breakdown2.  Assess vascular access sites hourly3.  Every 4-6 hours minimum:  Change oxygen saturation probe site4.  Every 4-6 hours:  If on nasal continuous positive airway pressure, respiratory therapy assess nares and determine need for appliance change or resting period  Outcome: Progressing  Flowsheets (Taken 6/4/2025 0138)  Skin Integrity Remains Intact:   Every 4-6 hours minimum:  Change oxygen saturation probe site   Monitor for areas of redness and/or skin breakdown   Assess vascular access sites hourly   Every 4-6 hours:  If on nasal continuous positive airway pressure, assess nares and determine need for appliance change or resting period   Assess need for specialty bed   Turn and reposition as indicated   Positioning devices   Pressure redistribution bed/mattress (bed type)   Check visual cues for pain   Monitor skin under medical devices     Problem: Neurosensory - Adult  Goal: Achieves stable or improved neurological status  Outcome: Progressing  Flowsheets (Taken 6/4/2025 0138)  Achieves stable or improved neurological status:   Assess for and report changes in neurological status   Monitor temperature, glucose, and sodium. Initiate appropriate interventions as ordered   Maintain blood pressure and fluid volume within ordered parameters to optimize cerebral perfusion and minimize risk of hemorrhage   Initiate measures to prevent increased intracranial pressure  Goal: Absence of seizures  Outcome: Progressing  Flowsheets (Taken 6/4/2025 0138)  Absence of seizures:   Monitor for seizure activity.  If seizure occurs, document type and location of movements and any associated apnea   Diagnostic studies as ordered   Administer anticonvulsants as ordered   Support airway/breathing, administer oxygen as needed   If seizure occurs, turn head to side and suction secretions as  Maintain NPO status until nausea and vomiting are resolved   Administer ordered antiemetic medications as needed   Advance diet as tolerated, if ordered     Problem: Metabolic/Fluid and Electrolytes - Adult  Goal: Electrolytes maintained within normal limits  Outcome: Progressing  Flowsheets (Taken 6/4/2025 0138)  Electrolytes maintained within normal limits:   Monitor labs and assess patient for signs and symptoms of electrolyte imbalances   Administer electrolyte replacement as ordered   Monitor response to electrolyte replacements, including repeat lab results as appropriate   Fluid restriction as ordered   Instruct patient on fluid and nutrition restrictions as appropriate

## 2025-06-04 NOTE — PROGRESS NOTES
Physical Therapy    Physical Therapy Initial Assessment     Name: Len Dorsey  : 1965  MRN: [id number]      Date of Service: 2025    Evaluating PT:  Florentino Barnard, PT, DPT  [id number]     Room #:  4423/4423-A  Diagnosis:  Failure to thrive in adult [R62.7]  FRANTZ (acute kidney injury) [N17.9]  Supratherapeutic INR [R79.1]  Anticoagulated on Coumadin [Z79.01]  Alcohol withdrawal syndrome without complication (HCC) [F10.930]  PMHx/PSHx:   has a past medical history of Alcoholic liver disease, H/O prosthetic aortic valve replacement, History of mitral valve replacement, Stroke (HCC), and Testicular cancer (HCC).   Procedure/Surgery:  EEG ;  Intubated ;  Extubated 6/3  Precautions:  Falls, NGT, O2, FMS, Cognition/agitation, Hx CVA with L residual per chart, Alarms   Equipment Needs:  TBD    SUBJECTIVE:    Pt unable to provide social history or PLOF at time of initial evaluation.  Pt is a poor historian.    Per chart, pt was unable to care for himself at home.     OBJECTIVE:   Initial Evaluation  Date: 25 Treatment Short Term/ Long Term   Goals   AM-PAC 6 Clicks      Was pt agreeable to Eval/treatment? Yes      Does pt have pain? No c/o pain     Bed Mobility  Rolling: Dep  Supine to sit: Attempted - unable/refused   Sit to supine: NT  Scooting: Dep x2  Rolling: SBA  Supine to sit: SBA  Sit to supine: SBA  Scooting: SBA   Transfers Sit to stand: NT  Stand to sit: NT  Stand pivot: NT  Sit to stand: Min A  Stand to sit: Min A  Stand pivot: Min A with AAD   Ambulation    NT  >50 feet with AAD Min A   Stair negotiation: ascended and descended  NT  >2 steps with 1 rail Min A   ROM BUE:  Per OT eval   BLE:  WFL     Strength BUE:  Per OT eval   BLE:  grossly >3/5 -- uncooperative with MMT      Balance Sitting EOB:  Unable to assess   Dynamic Standing:  NT  Sitting EOB:  SBA  Dynamic Standing:  Min A with AAD      Pt is A & O x 1 -- pt speaking nonsensically during orientation questions   RASS:  +1 to  prevent skin breakdown and contractures  [x] Safety and Education Training   [x] Patient/Caregiver Education   [x] HEP  [] Other     PT long term treatment goals are located in above grid    Frequency of treatments: 2-5x/week x 1-2 weeks.    Time in  1005  Time out  1030    Total Treatment Time  10 minutes     Evaluation Time includes thorough review of current medical information, gathering information on past medical history/social history and prior level of function, completion of standardized testing/informal observation of tasks, assessment of data and education on plan of care and goals.    CPT codes:  [] Low Complexity PT evaluation 65213  [x] Moderate Complexity PT evaluation 96978  [] High Complexity PT evaluation 21796  [] PT Re-evaluation 62531  [] Gait training 69061 -- minutes  [] Manual therapy 49273 -- minutes  [x] Therapeutic activities 64795 10 minutes  [] Therapeutic exercises 54623 - minutes  [] Neuromuscular reeducation 25344 -- minutes     Florentino Barnard, PT, DPT  [id number]

## 2025-06-04 NOTE — PROGRESS NOTES
RN spoke with IR regarding heparin gtt being held for tunneled PICC placement tomorrow. RN instructed to hold at 4AM on 6/5.

## 2025-06-04 NOTE — PROGRESS NOTES
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     Weight:   Wt Readings from Last 1 Encounters:   05/22/25 122 kg (268 lb 15.4 oz)       TSH:    Lab Results   Component Value Date/Time    TSH 2.98 05/20/2025 04:31 AM       Hepatic Function Panel:                            Lab Results   Component Value Date/Time    ALKPHOS 179 06/04/2025 04:16 AM    ALT 26 06/04/2025 04:16 AM    AST 31 06/04/2025 04:16 AM    BILITOT 2.1 06/04/2025 04:16 AM    BILIDIR 1.2 06/04/2025 04:16 AM    IBILI 0.9 06/04/2025 04:16 AM         Recent Labs     06/02/25  0316 06/03/25  0444 06/04/25  0416   HGB 8.1* 7.9* 8.1*     Date Warfarin Dose INR Comment   5/20 1 mg 3.2 VitK 10 mg x1 on 5/19  Meropenem started   5/21 1 mg- medication held  2 Warfarin held for IR procedure   5/22 1.5 mg 2    5/23 1mg  2.4    5/24 0.5 mg 2.9    5/25 0.5 mg 2.9    5/26 1 mg 2.5    5/27 1.5 mg 2.1    5/28 1.5 mg 2.3    5/29 1.5 mg 2.5    5/30 0.5 mg 3.2    - - - -   6/4 1 mg 1.5 Re-consulted                 Assessment:  Patient is a 59 y.o. male on warfarin for Atrial Fibrillation, AVR, and mMVR.  Patient's home warfarin dosing regimen is 1 mg daily - there is some questions surrounding compliance.   Goal INR 2.5 - 3.5  Heparin bridge to continue until INR therapeutic    Plan:  Warfarin 1 mg daily for now - pt's last weekly dose was roughly 7.5 mg (inpt) with a therapeutic INR  Daily PT/INR until the INR is stable within the therapeutic range  Pharmacist will follow and monitor/adjust dosing as necessary    Thank you for this consult,    Holly Meraz, PharmD, BCPS, BCCCP 6/4/2025 10:27 AM ]

## 2025-06-04 NOTE — PROGRESS NOTES
Comprehensive Nutrition Assessment    Type and Reason for Visit:  Reassess    Nutrition Recommendations/Plan:   Continue NPO, Modify Tube Feeding to better meet estimated needs    Standard w/ Fiber @ 65 ml/hr + 1 protein modular daily to provide 1560 ml tv, 2340 kcals, 99gm pro (2412 kcals, 117 gm pro w/ mod), 1186 ml free water       Malnutrition Assessment:  Malnutrition Status:  At risk for malnutrition (05/22/25 1224)    Context:  Chronic Illness     Findings of the 6 clinical characteristics of malnutrition:  Energy Intake:  75% or less estimated energy requirements for 1 month or longer (per RD encounter review)  Weight Loss:  Unable to assess (d/t fluid shifts w/ CHF / CKD)     Body Fat Loss:  No body fat loss     Muscle Mass Loss:  No muscle mass loss    Fluid Accumulation:  No fluid accumulation     Strength:  Not Performed    Nutrition Assessment:    Pt remains at risk now extubated 6/3 w/ ongoing need for EN support. Pt admit d/t FTT, SOB, AMS noted not able to care for self or taking meds. NOted FRANTZ on CKD, PNA, sz activity in ED. Noted septic shock, unclear etiology, hepatic encephalopathy, DTs w/ ETOH withdrawal. PMHx ETOH liver cirrhosis, HLD, CVD, AICD, AVR/MVR, CHF, HTN, CAD s/p CABG 20', COPD, testicular CA s/p chemo. Multiple DTIs per wound care. Pt noted w/ bloody output per NGT possibly 2/2 portal hypertensive gastropathy. NGT currently clamped. Pt s/p failed SLP eval 6/4. Will provide updated TF recs and continue to monitor.    Nutrition Related Findings:    pt extubated, disoriented x2, NGT clamped, hypoactive BS, abd distention, +1-3 edema, fluids WDL, hypokalemia, bili 2.1 Wound Type: Multiple, Deep Tissue Injury       Current Nutrition Intake & Therapies:    Average Meal Intake: NPO     Diet NPO  ADULT TUBE FEEDING; Nasogastric; Standard with Fiber; Continuous; 10; Yes; 10; Q 4 hours; 50; 30; Q 3 hours  Current Tube Feeding (TF) Orders:  Feeding Route: Nasogastric  Formula: Standard

## 2025-06-04 NOTE — PROGRESS NOTES
OCCUPATIONAL THERAPY INITIAL EVALUATION    [facility]  [address], [place]     Date:2025                                                               Patient Name: Len Dorsey  MRN: [id number]  : 1965  Room: [address] [place] [address]    Evaluating OT: Camille Jensen, OTD,  OTR/L; [id number]  Referring Provider: Bay Eugene MD   Specific Provider Orders/Date: OT eval and treat (25)       Diagnosis: Failure to thrive in adult [R62.7]  FRANTZ (acute kidney injury) [N17.9]  Supratherapeutic INR [R79.1]  Anticoagulated on Coumadin [Z79.01]  Alcohol withdrawal syndrome without complication (HCC) [F10.930]     Reason for admission: Pt admitted     Surgery/Procedures: EEG ; Intubated ; Extubated 6/3     Pertinent Medical History:    Past Medical History:   Diagnosis Date    Alcoholic liver disease     H/O prosthetic aortic valve replacement 2020    [facility]  Mitral and pacemaker at this time as well    History of mitral valve replacement 2020    at [facility]  Aortic Valve placed at this time as well as pacemaker    Stroke (HCC)     Testicular cancer (HCC)     in remission since         *Precautions:  Fall Risk, NGT, O2, FMS, hx L alesia, Cognition/agitation, Delirium, Alarms     Assessment of current deficits   [x] Functional mobility  [x]ADLs  [x] Strength               []Cognition   [x] Functional transfers   [x] IADLs         [x] Safety Awareness   [x]Endurance   [] Fine Coordination        [] ROM     [] Vision/perception   []Sensation    []Gross Motor Coordination [x] Balance   [] Delirium                  []Motor Control     [] Communication    OT PLAN OF CARE   OT POC based on physician orders, patient diagnosis and results of clinical assessment.       Frequency/Duration: 1-3 days/wk for 1-2 weeks PRN    Specific OT Treatment Interventions to include:   * Instruction/training on adapted ADL techniques and AE  recommendations to increase functional independence within precautions       * Training on energy conservation strategies, correct breathing pattern and techniques to improve independence/tolerance for self-care routine  * Functional transfer/mobility training/DME recommendations for increased independence, safety, and fall prevention  * Patient/Family education to increase follow through with safety techniques and functional independence  * Recommendation of environmental modifications for increased safety with functional transfers/mobility and ADLs  * Cognitive retraining/development of therapeutic activities to improve problem solving, judgement, memory, and attention for increased safety/participation in ADL/IADL tasks  * Sensory re-education to improve body/limb awareness, maintain/improve skin integrity, and improve hand/UE motor function  * Visual-perceptual training to improve environmental scanning, visual attention/focus, and oculomotor skills for increased safety/independence with functional transfers/mobility and ADLs  * Splinting/positioning for increased function, prevention of contractures, and improve skin integrity  * Therapeutic exercise to improve motor endurance, ROM, and functional strength for ADLs/functional transfers  * Therapeutic activities to facilitate/challenge dynamic balance, stand tolerance for increased safety and independence with ADLs  * Therapeutic activities to facilitate gross/fine motor skills for increased independence with ADLs  * Neuro-muscular re-education: facilitation of righting/equilibrium reactions, midline orientation, scapular stability/mobility, normalization of muscle tone, and facilitation of volitional active controled movement  * Positioning to improve skin integrity, interaction with environment and functional independence  * Delirium prevention/treatment  * Manual techniques for edema management      Recommended Adaptive Equipment: TBD     Pt unable to provide

## 2025-06-04 NOTE — PROGRESS NOTES
Gastroenterology, Hepatology, &  Advanced Endoscopy    Progress Note  Dr.Manzo AMOR 6/5-/6/6     Subjective: Pt extubated 6/3/25, on 4 liters O2 via NC. Was without sleep per staff for 24 hours. Sleeping current arousable, confused.    Reason for Consult: GI bleeding, hx alcoholic cirrhosis, ? Need for a scope     HPI:   Len Dorsey is a 59 y.o. male w/ PMH of  has a past medical history of Alcoholic liver disease, H/O prosthetic aortic valve replacement, History of mitral valve replacement, Stroke (HCC), and Testicular cancer (HCC). who presents to the ED due to failure to thrive. Patient has a history of alcoholic cirrhosis as well. His hospital course was complicated with upper GI bleeding and acute hypoxic respiratory failure. He was sent to the MICU from the ED and then transferred out of the MICU on 5/22, he was transferred back on 5/23 and intubated there. Patient had been intubated for 9 days. Extubated 6/3/25.      PMH:       Diagnosis Date    Alcoholic liver disease     H/O prosthetic aortic valve replacement 09/2020    [facility]  Mitral and pacemaker at this time as well    History of mitral valve replacement 09/2020    at [facility]  Aortic Valve placed at this time as well as pacemaker    Stroke (HCC)     Testicular cancer (HCC)     in remission since 1988        PSH:       Procedure Laterality Date    COLONOSCOPY N/A 8/24/2022    COLONOSCOPY POLYPECTOMY HOT BIOPSY performed by Rene Lorenzo MD at [facility]COPY    FRACTURE SURGERY      Left Tibial Plateau Fracture 3/29/2022     PACEMAKER PLACEMENT  09/2020    UPPER GASTROINTESTINAL ENDOSCOPY N/A 8/24/2022    EGD DIAGNOSTIC ONLY performed by Rene Lorenzo MD at [facility]OSCOPY    UPPER GASTROINTESTINAL ENDOSCOPY N/A 5/9/2024    ESOPHAGOGASTRODUODENOSCOPY          +++AVAIL 1430+++ performed by Min Mcdaniels DO at [facility] ENDOSCOPY    UPPER GASTROINTESTINAL ENDOSCOPY N/A 2/28/2025    ESOPHAGOGASTRODUODENOSCOPY BIOPSY performed by Priscilla  LABA1C 4.9 05/08/2024     Lab Results   Component Value Date    INR 1.5 06/04/2025    INR 1.6 06/03/2025    INR 1.4 06/01/2025    PROTIME 16.8 (H) 06/04/2025    PROTIME 17.2 (H) 06/03/2025    PROTIME 15.6 (H) 06/01/2025      MELD 3.0: 19 at 6/4/2025  4:16 AM  MELD-Na: 19 at 6/4/2025  4:16 AM  Calculated from:  Serum Creatinine: 1.7 mg/dL at 6/4/2025  4:16 AM  Serum Sodium: 146 mmol/L (Using max of 137 mmol/L) at 6/4/2025  4:16 AM  Total Bilirubin: 2.1 mg/dL at 6/4/2025  4:16 AM  Serum Albumin: 3.3 g/dL at 6/4/2025  4:16 AM  INR(ratio): 1.5 at 6/4/2025  4:16 AM  Age at listing (hypothetical): 59 years  Sex: Male at 6/4/2025  4:16 AM     Sed Rate, Automated   Date Value Ref Range Status   02/22/2025 30 (H) 0 - 15 mm/Hr Final   02/07/2023 10 0 - 15 mm/Hr Final     Lipase   Date Value Ref Range Status   02/17/2025 40 13 - 60 U/L Final   08/02/2024 32 13 - 60 U/L Final   05/07/2024 20 13 - 60 U/L Final        Previous Endoscopies: Date of last Colonoscopy: 8/24/2022  No flexible sigmoidoscopy on file    EGD 2/28/25  Findings:         -  The examined esophagus was normal.         -  The Z-line was regular.         -  Moderate portal hypertensive gastropathy was found in the cardia, in the gastric fundus and in the gastric body.         -  The gastric antrum was normal.  Biopsies were taken with a cold forceps for Helicobacter pylori testing.         -  The examined duodenum was normal.      ASSESSMENT: 59y/M w/ history of cirrhosis and mechanical valve who presents with GI bleeding secondary to coagulopathy and supratherapeutic INR.    CT A/P w/o contrast  6/1/25 found small volume ascites in predominantly perihepatic, perisplenic and pelvic  distribution. Cirrhosis and splenomegaly. Trace bilateral pleural effusions.Cardiomegaly.    CPAP/PS 5 PEEP +5 FiO2 35%        PLAN:  OK for meds through NG  -Management per ICU team  - Continue to closely monitor INR to therapeutic range. INR currently 1.5, INR > 10.0 5/19/25, 3.3

## 2025-06-04 NOTE — PROGRESS NOTES
Department of Internal Medicine  Nephrology Attending Consult Note    Events reviewed  Patient was extubated.    SUBJECTIVE: We are following Mr. Dorsey for FRANTZ on CKD.  Patient lethargic.    PHYSICAL EXAM:      Vitals:    VITALS:  BP (!) 106/56   Pulse 71   Temp 98.7 °F (37.1 °C) (Axillary)   Resp 26   Ht 1.778 m (5' 10\")   Wt 122 kg (268 lb 15.4 oz)   SpO2 100%   BMI 38.59 kg/m²   24HR INTAKE/OUTPUT:    Intake/Output Summary (Last 24 hours) at 6/4/2025 0806  Last data filed at 6/3/2025 2100  Gross per 24 hour   Intake 2222.02 ml   Output 1200 ml   Net 1022.02 ml       Constitutional: Patient is lethargic  HEENT: Pupils equal reactive  Respiratory: Decreased breath sounds at the bases  Cardiovascular/Edema: Heart sounds are regular  Gastrointestinal: Abdomen soft  Neurologic: Unable to level  Skin: No lesion  Other: + Edema    Scheduled Meds:   folic acid  1 mg IntraVENous Daily    micafungin  100 mg IntraVENous Daily    [Held by provider] midodrine  20 mg Oral TID WC    levETIRAcetam  250 mg IntraVENous BID    vitamin B-6  50 mg Oral Daily    white petrolatum   Topical BID    ipratropium 0.5 mg-albuterol 2.5 mg  1 Dose Inhalation Q4H WA RT    budesonide  0.5 mg Nebulization BID RT    arformoterol tartrate  15 mcg Nebulization BID RT    [Held by provider] bumetanide  1 mg Oral Daily    chlorhexidine  15 mL Mouth/Throat BID    thiamine  100 mg IntraVENous Daily    [Held by provider] lactulose  20 g Oral TID    atorvastatin  20 mg Oral Nightly    [Held by provider] rifAXIMin  400 mg Oral TID    [Held by provider] busPIRone  5 mg Oral BID    sodium chloride flush  5-40 mL IntraVENous 2 times per day    multivitamin  1 tablet Oral Daily    [Held by provider] folic acid  1 mg Oral Daily    pantoprazole  40 mg IntraVENous BID     Continuous Infusions:   dextrose 75 mL/hr at 06/04/25 0229    [Held by provider] dextrose Stopped (06/03/25 0327)    heparin (PORCINE) Infusion 12 Units/kg/hr (06/04/25 9313)     dexmedeTOMIDine (PRECEDEX) 1,000 mcg in sodium chloride 0.9 % 250 mL infusion 0.2 mcg/kg/hr (06/03/25 2039)    sodium chloride Stopped (05/22/25 2105)    dextrose       PRN Meds:.heparin (porcine), heparin (porcine), midazolam, white petrolatum, sodium chloride flush, sodium chloride, acetaminophen **OR** acetaminophen, [Held by provider] melatonin, ondansetron **OR** ondansetron, polyethylene glycol, glucose, dextrose bolus **OR** dextrose bolus, glucagon (rDNA), dextrose      DATA:    CBC:   Lab Results   Component Value Date/Time    WBC 10.5 06/04/2025 04:16 AM    RBC 2.98 06/04/2025 04:16 AM    HGB 8.1 06/04/2025 04:16 AM    HCT 27.1 06/04/2025 04:16 AM    MCV 90.9 06/04/2025 04:16 AM    MCH 27.2 06/04/2025 04:16 AM    MCHC 29.9 06/04/2025 04:16 AM    RDW 22.7 06/04/2025 04:16 AM     05/25/2025 04:06 AM    MPV Can not be calculated 06/04/2025 04:16 AM     CMP:    Lab Results   Component Value Date/Time     06/04/2025 04:16 AM    K 3.4 06/04/2025 04:16 AM    K 3.9 09/15/2022 03:08 PM     06/04/2025 04:16 AM    CO2 22 06/04/2025 04:16 AM    BUN 52 06/04/2025 04:16 AM    CREATININE 1.7 06/04/2025 04:16 AM    GFRAA >60 09/15/2022 03:08 PM    LABGLOM 45 06/04/2025 04:16 AM    LABGLOM 31 03/05/2024 05:57 AM    GLUCOSE 111 06/04/2025 04:16 AM    CALCIUM 8.9 06/04/2025 04:16 AM    BILITOT 2.1 06/04/2025 04:16 AM    ALKPHOS 179 06/04/2025 04:16 AM    AST 31 06/04/2025 04:16 AM    ALT 26 06/04/2025 04:16 AM     Magnesium:    Lab Results   Component Value Date/Time    MG 2.2 06/04/2025 04:16 AM     Phosphorus:    Lab Results   Component Value Date/Time    PHOS 3.8 06/04/2025 04:16 AM     Radiology Review:        FINDINGS:  Head: No acute intracranial hemorrhage, mass effect or hydrocephalus.. Redemonstrated old infarcts of right more than left cerebrum and cerebellum  and chronic white matter small vessel ischemic changes.     Abdominopelvic: Prominent subcutaneous edema is worse compared to prior.

## 2025-06-04 NOTE — PROGRESS NOTES
Department of Internal Medicine  Infectious Diseases  Progress Note      C/C:  Resp failure, D glucan antigenemia     All above noted  Pt's extubated, denies fever, chills,SOB, abdomen pain   Afebrile         Current Facility-Administered Medications   Medication Dose Route Frequency Provider Last Rate Last Admin    dextrose 5 % solution   IntraVENous Continuous Omar Joiner MD 75 mL/hr at 06/04/25 0923 New Bag at 06/04/25 0923    [Held by provider] dextrose 5 % solution   IntraVENous Continuous Taiwo Sullivan MD   Stopped at 06/03/25 0327    folic acid injection 1 mg  1 mg IntraVENous Daily Alrene Puentes DO   1 mg at 06/04/25 0816    heparin 25,000 units in dextrose 5% 250 mL (premix) infusion  5-30 Units/kg/hr IntraVENous Continuous Caitlyn Sagastume MD 14.6 mL/hr at 06/04/25 0513 12 Units/kg/hr at 06/04/25 0513    heparin (porcine) injection 4,000 Units  4,000 Units IntraVENous PRN Caitlyn Sagastume MD        heparin (porcine) injection 2,000 Units  2,000 Units IntraVENous PRN Caitlyn Sagastume MD   2,000 Units at 06/01/25 1451    micafungin (MYCAMINE) 100 mg in sodium chloride 0.9 % 100 mL IVPB ([id number])  100 mg IntraVENous Daily Mikael Redding MD   Stopped at 06/04/25 0923    [Held by provider] midodrine (PROAMATINE) tablet 20 mg  20 mg Oral TID  Angus Davenport MD   20 mg at 05/30/25 1718    levETIRAcetam (KEPPRA) injection 250 mg  250 mg IntraVENous BID Jose Alberto Bourgeois MD   250 mg at 06/04/25 0816    vitamin B-6 (PYRIDOXINE) tablet 50 mg  50 mg Oral Daily Jose Alberto Bourgeois MD        white petrolatum ointment   Topical BID Caitlyn Sagastume MD   Given at 06/04/25 0816    dexmedeTOMIDine (PRECEDEX) 1,000 mcg in sodium chloride 0.9 % 250 mL infusion  0.1-1.5 mcg/kg/hr IntraVENous Continuous Angus Davenport MD 6.1 mL/hr at 06/03/25 2339 0.2 mcg/kg/hr at 06/03/25 2339    midazolam PF (VERSED) injection 2 mg  2 mg IntraVENous Q4H PRN Caitlyn Sagastume MD   2 mg at 05/28/25

## 2025-06-04 NOTE — PROGRESS NOTES
[facility]  Department of Internal Medicine   Internal Medicine Residency   MICU Progress Note    Patient:  Len Dorsey 59 y.o. male  MRN: [id number]     Date of Service: 6/4/2025    Allergy: Patient has no known allergies.    Subjective     Patient was extubated yesterday, saturating fine now on 4 L nasal cannula.  Patient is oriented to self but not oriented to time and place, still on Precedex drip and heparin drip.  No acute overnight events.    Objective     VS: BP (!) 112/58   Pulse 70   Temp 98.3 °F (36.8 °C) (Oral)   Resp 28   Ht 1.778 m (5' 10\")   Wt 122 kg (268 lb 15.4 oz)   SpO2 100%   BMI 38.59 kg/m²   ABP (Arterial line BP): 112/54  ABP Mean (Arterial Line Mean): 71 mmHg    I & O - 24hr:   Intake/Output Summary (Last 24 hours) at 6/4/2025 0828  Last data filed at 6/3/2025 2100  Gross per 24 hour   Intake 2222.02 ml   Output 1200 ml   Net 1022.02 ml       Physical Exam:  General Appearance: Alert and oriented to self, intermittent confusion, no distress.  Neck: no adenopathy, no carotid bruit, supple, symmetrical, trachea midline, and thyroid not enlarged, symmetric, no tenderness/mass/nodules  Lung: rales bilaterally, diminished breath sounds on the left  Heart: normal rate with irregular rhythm  Abdomen: distended, firm, no guarding  Extremities:   2+ pitting edema up to the thighs  Musculoskeletal: No joint swelling, no muscle tenderness. ROM normal in all joints of extremities.   Neurologic: Mental status: Follows commands, disoriented, no focal neurologic deficits.    Lines     site day    Art line   R Radial 5/27/2025   TLC R IJ 5/28/2025   PICC None    Hemoaccess None      ABG:     Lab Results   Component Value Date/Time    PH 7.421 06/04/2025 04:45 AM    PCO2 37.7 06/04/2025 04:45 AM    PO2 137.1 06/04/2025 04:45 AM    HCO3 24.0 06/04/2025 04:45 AM    BE -0.4 06/04/2025 04:45 AM    THB 8.8 06/04/2025 04:45 AM    O2SAT 98.8 06/04/2025 04:45 AM        Medications

## 2025-06-05 NOTE — PROGRESS NOTES
Confirmed with floor RN, heparin drip held at 0400, pt NPO and tube feeds not started.  Dr Bailey ok to proceed with elevated WBC.  IR will call when ready.

## 2025-06-05 NOTE — PLAN OF CARE
Problem: Chronic Conditions and Co-morbidities  Goal: Patient's chronic conditions and co-morbidity symptoms are monitored and maintained or improved  Outcome: Progressing     Problem: Discharge Planning  Goal: Discharge to home or other facility with appropriate resources  Outcome: Progressing     Problem: Safety - Adult  Goal: Free from fall injury  Outcome: Progressing     Problem: Pain  Goal: Verbalizes/displays adequate comfort level or baseline comfort level  Outcome: Progressing     Problem: Skin/Tissue Integrity  Goal: Skin integrity remains intact  Description: 1.  Monitor for areas of redness and/or skin breakdown2.  Assess vascular access sites hourly3.  Every 4-6 hours minimum:  Change oxygen saturation probe site4.  Every 4-6 hours:  If on nasal continuous positive airway pressure, respiratory therapy assess nares and determine need for appliance change or resting period  Outcome: Progressing     Problem: Nutrition Deficit:  Goal: Optimize nutritional status  Outcome: Progressing  Flowsheets (Taken 6/4/2025 1347 by Henrietta Ferraro, MS, RD, LD)  Nutrient intake appropriate for improving, restoring, or maintaining nutritional needs: Recommend, monitor, and adjust tube feedings and TPN/PPN based on assessed needs     Problem: Neurosensory - Adult  Goal: Achieves stable or improved neurological status  Outcome: Progressing     Problem: Respiratory - Adult  Goal: Achieves optimal ventilation and oxygenation  Outcome: Progressing     Problem: Skin/Tissue Integrity - Adult  Goal: Skin integrity remains intact  Description: 1.  Monitor for areas of redness and/or skin breakdown2.  Assess vascular access sites hourly3.  Every 4-6 hours minimum:  Change oxygen saturation probe site4.  Every 4-6 hours:  If on nasal continuous positive airway pressure, respiratory therapy assess nares and determine need for appliance change or resting period  Outcome: Progressing     Problem: Gastrointestinal - Adult  Goal:

## 2025-06-05 NOTE — PATIENT CARE CONFERENCE
[facility] Quality Flow/Interdisciplinary Rounds Progress Note        Quality Flow Rounds held on June 5, 2025    Disciplines Attending:  Bedside Nurse, , , and Nursing Unit Leadership    Len Dorsey was admitted on 5/19/2025 10:40 AM    Anticipated Discharge Date:       Disposition:    Jasvir Score:  Jasvir Scale Score: 13    [facility] RISK OF UNPLANNED READMISSION 2.0             23.2 Total Score        Discussed patient goal for the day, patient clinical progression, and barriers to discharge.  The following Goal(s) of the Day/Commitment(s) have been identified: report labs/diagnostics       Bonny Paulino RN  June 5, 2025

## 2025-06-05 NOTE — PLAN OF CARE
Problem: Chronic Conditions and Co-morbidities  Goal: Patient's chronic conditions and co-morbidity symptoms are monitored and maintained or improved  6/5/2025 0915 by Mackenzie Calix RN  Outcome: Progressing     Problem: Discharge Planning  Goal: Discharge to home or other facility with appropriate resources  6/5/2025 0915 by Mackenzie Calix RN  Outcome: Progressing     Problem: Safety - Adult  Goal: Free from fall injury  6/5/2025 0915 by Mackenzie Calix RN  Outcome: Progressing     Problem: Pain  Goal: Verbalizes/displays adequate comfort level or baseline comfort level  6/5/2025 0915 by Mackenzie Calix RN  Outcome: Progressing     Problem: Skin/Tissue Integrity  Goal: Skin integrity remains intact  Description: 1.  Monitor for areas of redness and/or skin breakdown2.  Assess vascular access sites hourly3.  Every 4-6 hours minimum:  Change oxygen saturation probe site4.  Every 4-6 hours:  If on nasal continuous positive airway pressure, respiratory therapy assess nares and determine need for appliance change or resting period  6/5/2025 0915 by Mackenzie Calix RN  Outcome: Progressing     Problem: Nutrition Deficit:  Goal: Optimize nutritional status  6/5/2025 0915 by Mackenzie Calix RN  Outcome: Progressing

## 2025-06-05 NOTE — CARE COORDINATION
6/5/25  Transition of care update.  Patient continues to be NPO with NG tube in place.  Patient also noted to have a external urinary catheter as well as a fecal management system.  Patient is on day 10 of 14 of IV micafungin. Labs reviewed with Pot 3.2, BUN 43 , Cr 1.7, and GFR 48. PT/OT evals reveal AM-PAC of 10/24 for OT and 6/24 for PT.  Patient is being followed by [facility] and also has a referral to [facility] per previous cm.  Spoke with patient and call placed to shane/POA to inquire about CAR options should patients insurance not approve [facility]. Leonie Lopez states family is not able to afford the $203 a day co-pay to go to a facility.  Patient is not eligible to go to a facility with out a co-pay as he did not have a 60 day break in services before returning to the hospital. Leonie Lopez aware that the family would need to apply for Medicaid and go to a facility willing to accepting Medicaid pending to to help with co-pays. A CAR list was left in patients room Patient will need a pre-cert to go to [facility] and family wants [facility] referral tried first. Call placed to [facility] liaison and awaiting return call.  SW/CM to follow.    4:00pm Pre-cert is being started for [facility]    Electronically signed by ANJEL Hair on 6/5/2025 at 3:21 PM

## 2025-06-05 NOTE — PROGRESS NOTES
[facility] Hospitalist Progress Note    Admitting Date and Time: 5/19/2025 10:40 AM  Admit Dx: Failure to thrive in adult [R62.7]  FRANTZ (acute kidney injury) [N17.9]  Supratherapeutic INR [R79.1]  Anticoagulated on Coumadin [Z79.01]  Alcohol withdrawal syndrome without complication (HCC) [F10.530]    Synopsis: Patient presented to the emergency department with concerns about not being able to take care of himself.  Patient apparently been canceled doctors appointments and not taking medications.  Has a history of alcohol abuse and is a daily drinker.  Patient states not taking his medications and he ran out of them.  He is not going to doctors appointments because he cannot get a hold of the doctor.  Also reported some shortness of breath.  Denies any fever, chills, nausea, vomiting, chest pain, headache, dizziness, abdominal pain, hematuria.,  Dysuria or constipation or diarrhea.  Vital signs within normal limits and stable.  Although reviewing his vital signs throughout his ER course he did drop to 85% on room air.  He is currently 99% on 3 L nasal cannula.  Laboratory studies demonstrate BUN 22, creatinine 2.4, procalcitonin 0.19, troponin 74 with repeat of 67, proBNP 5566, alk phos 259, hemoglobin 8.3 and a WBC of 12.3.  INR is greater than 10.  CT imaging shows some increased third spacing of fluid.  Chest x-ray shows bibasilar opacities.  Patient was started on ceftriaxone and doxycycline for presumed pneumonia.  He was also noted to have seizure-like activity for which she was given Ativan.  He was also placed on CIWA protocol.  Patient be admitted to the ICU.  Patient continues to require pressors.  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    5/27 restarted on levophed, bumex held   5/28 - on Levophed, midodrine increased   5/30 - continues on Levophed, intermittently following commands, midodrine increased   5/31 - Bleeding through NG tube, INR  4.1, given ffp and vitamin K. GI consulted   6/1 still with bleeding per NG but improved. Remains on precedex   6/2 on precedex but awake, following commands, on SBT  6/3 extubated to nasal cannula  6/4 transferred out of the ICU  6/5 to have IR guided PICC line placed, Speech to work with him again today.     Subjective:  Patient is being followed for Failure to thrive in adult [R62.7]  FRANTZ (acute kidney injury) [N17.9]  Supratherapeutic INR [R79.1]  Anticoagulated on Coumadin [Z79.01]  Alcohol withdrawal syndrome without complication (HCC) [F10.930]     Patient seen and examined.   Events reviewed.   Transferred out of the ICU yesterday   No new complaints  Eager to eat again         warfarin placeholder: dosing by pharmacy   Oral RX Placeholder    warfarin  1 mg Oral Daily    folic acid  1 mg IntraVENous Daily    micafungin  100 mg IntraVENous Daily    [Held by provider] midodrine  20 mg Oral TID WC    levETIRAcetam  250 mg IntraVENous BID    vitamin B-6  50 mg Oral Daily    white petrolatum   Topical BID    ipratropium 0.5 mg-albuterol 2.5 mg  1 Dose Inhalation Q4H WA RT    budesonide  0.5 mg Nebulization BID RT    arformoterol tartrate  15 mcg Nebulization BID RT    [Held by provider] bumetanide  1 mg Oral Daily    chlorhexidine  15 mL Mouth/Throat BID    thiamine  100 mg IntraVENous Daily    [Held by provider] lactulose  20 g Oral TID    atorvastatin  20 mg Oral Nightly    [Held by provider] rifAXIMin  400 mg Oral TID    [Held by provider] busPIRone  5 mg Oral BID    sodium chloride flush  5-40 mL IntraVENous 2 times per day    multivitamin  1 tablet Oral Daily    [Held by provider] folic acid  1 mg Oral Daily    pantoprazole  40 mg IntraVENous BID     heparin (porcine), 4,000 Units, PRN  heparin (porcine), 2,000 Units, PRN  midazolam, 2 mg, Q4H PRN  white petrolatum, , BID PRN  sodium chloride flush, 5-40 mL, PRN  sodium chloride, , PRN  acetaminophen, 650 mg, Q6H PRN   Or  acetaminophen, 650 mg, Q6H  PRN  [Held by provider] melatonin, 3 mg, Nightly PRN  ondansetron, 4 mg, Q8H PRN   Or  ondansetron, 4 mg, Q6H PRN  polyethylene glycol, 17 g, Daily PRN  glucose, 4 tablet, PRN  dextrose bolus, 125 mL, PRN   Or  dextrose bolus, 250 mL, PRN  glucagon (rDNA), 1 mg, PRN  dextrose, , Continuous PRN         Objective:    BP (!) 140/82   Pulse (!) 109   Temp 98.5 °F (36.9 °C) (Temporal)   Resp 22   Ht 1.778 m (5' 10\")   Wt 122 kg (268 lb 15.4 oz)   SpO2 98%   BMI 38.59 kg/m²     General Appearance: Awake alert not in distress   skin: warm and dry  Head: normocephalic and atraumatic  Eyes: pupils equal, round, and reactive to light, extraocular eye movements intact, conjunctivae normal  Neck: neck supple and non tender without mass   Pulmonary/Chest: clear to auscultation bilaterally- no wheezes, rales or rhonchi,   Cardiovascular: normal rate, normal S1 and S2 and no carotid bruits  Abdomen: soft, non-tender, non-distended, normal bowel sounds, no masses or organomegaly  Extremities: no cyanosis, no clubbing and no edema  Neurologic: 4/5 strength upper extremities         Recent Labs     06/03/25  1600 06/04/25  0416 06/05/25  0555    146 145   K 3.8 3.4* 3.2*   * 111* 110*   CO2 21* 22 25   BUN 57* 52* 43*   CREATININE 1.8* 1.7* 1.7*   GLUCOSE 129* 111* 111*   CALCIUM 9.0 8.9 9.2       Recent Labs     06/03/25  0444 06/04/25  0416 06/05/25  0555   WBC 6.8 10.5 15.1*   RBC 2.91* 2.98* 3.17*   HGB 7.9* 8.1* 8.5*   HCT 26.2* 27.1* 28.7*   MCV 90.0 90.9 90.5   MCH 27.1 27.2 26.8   MCHC 30.2* 29.9* 29.6*   RDW 22.8* 22.7* 23.1*   MPV Can not be calculated Can not be calculated Can not be calculated     Labs and imaging reviewed.    Assessment:    Acute hypoxic respiratory failure  Acute encephalopathy  Septic shock unclear etiology  Beta D glucan antigenemia  Supratherapeutic INR  Delirium tremens with alcohol withdrawal syndrome  New onset seizure  FRANTZ on CKD 3a  Hypotension, on midodrine  Liver

## 2025-06-05 NOTE — PROGRESS NOTES
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     Weight:   Wt Readings from Last 1 Encounters:   05/22/25 122 kg (268 lb 15.4 oz)       TSH:    Lab Results   Component Value Date/Time    TSH 2.98 05/20/2025 04:31 AM       Hepatic Function Panel:                            Lab Results   Component Value Date/Time    ALKPHOS 257 06/05/2025 05:55 AM    ALT 30 06/05/2025 05:55 AM    AST 38 06/05/2025 05:55 AM    BILITOT 2.7 06/05/2025 05:55 AM    BILIDIR 1.7 06/05/2025 05:55 AM    IBILI 1.0 06/05/2025 05:55 AM         Recent Labs     06/03/25  0444 06/04/25  0416 06/05/25  0555   HGB 7.9* 8.1* 8.5*     Date Warfarin Dose INR Comment   5/20 1 mg 3.2 VitK 10 mg x1 on 5/19  Meropenem started   5/21 1 mg- medication held  2 Warfarin held for IR procedure   5/22 1.5 mg 2    5/23 1mg  2.4    5/24 0.5 mg 2.9    5/25 0.5 mg 2.9    5/26 1 mg 2.5    5/27 1.5 mg 2.1    5/28 1.5 mg 2.3    5/29 1.5 mg 2.5    5/30 0.5 mg 3.2    - - - -   6/4 1 mg 1.5 Re-consulted   6/5 < 1 mg > 1.7            Assessment:  Patient is a 59 y.o. male on warfarin for Atrial Fibrillation, AVR, and mMVR.  Patient's home warfarin dosing regimen is 1 mg daily - there is some questions surrounding compliance.   Goal INR 2.5 - 3.5  Heparin bridge to continue until INR therapeutic    Plan:  Warfarin 1 mg daily for now - pt's last weekly dose was roughly 7.5 mg (inpt) with a therapeutic INR  Daily PT/INR until the INR is stable within the therapeutic range  Pharmacist will follow and monitor/adjust dosing as necessary    Thank you for this consult,    Kip Burris, PharmD  PGY-1 Pharmacy Practice Resident, [phone]  6/5/2025 11:15 AM

## 2025-06-05 NOTE — PROGRESS NOTES
Department of Internal Medicine  Nephrology Attending Consult Note    Events reviewed    SUBJECTIVE: We are following Mr. Dorsey for FRANTZ on CKD.  Patient reports no complaints.    PHYSICAL EXAM:      Vitals:    VITALS:  BP (!) 140/82   Pulse (!) 109   Temp 98.5 °F (36.9 °C) (Temporal)   Resp 22   Ht 1.778 m (5' 10\")   Wt 122 kg (268 lb 15.4 oz)   SpO2 98%   BMI 38.59 kg/m²   24HR INTAKE/OUTPUT:    Intake/Output Summary (Last 24 hours) at 6/5/2025 0919  Last data filed at 6/5/2025 0357  Gross per 24 hour   Intake 2728.03 ml   Output 1950 ml   Net 778.03 ml       Constitutional: Patient is awake  HEENT: Pupils equal reactive  Respiratory: Decreased breath sounds at the bases  Cardiovascular/Edema: Heart sounds are regular  Gastrointestinal: Abdomen soft  Neurologic: Nonfocal  Skin: No lesion  Other: + Edema    Scheduled Meds:   potassium chloride  10 mEq IntraVENous Q1H    warfarin placeholder: dosing by pharmacy   Oral RX Placeholder    warfarin  1 mg Oral Daily    folic acid  1 mg IntraVENous Daily    micafungin  100 mg IntraVENous Daily    [Held by provider] midodrine  20 mg Oral TID WC    levETIRAcetam  250 mg IntraVENous BID    vitamin B-6  50 mg Oral Daily    white petrolatum   Topical BID    ipratropium 0.5 mg-albuterol 2.5 mg  1 Dose Inhalation Q4H WA RT    budesonide  0.5 mg Nebulization BID RT    arformoterol tartrate  15 mcg Nebulization BID RT    [Held by provider] bumetanide  1 mg Oral Daily    chlorhexidine  15 mL Mouth/Throat BID    thiamine  100 mg IntraVENous Daily    [Held by provider] lactulose  20 g Oral TID    atorvastatin  20 mg Oral Nightly    [Held by provider] rifAXIMin  400 mg Oral TID    [Held by provider] busPIRone  5 mg Oral BID    sodium chloride flush  5-40 mL IntraVENous 2 times per day    multivitamin  1 tablet Oral Daily    [Held by provider] folic acid  1 mg Oral Daily    pantoprazole  40 mg IntraVENous BID     Continuous Infusions:   dextrose 75 mL/hr at 06/04/25 8469     white matter small vessel ischemic changes.     Abdominopelvic: Prominent subcutaneous edema is worse compared to prior. Redemonstrated left nephrolithiasis and cholelithiasis.  The renal lesions are less well demonstrated by noncontrast exam.  Some areas increased  attenuation probably hyperdense cysts but nonspecific consider evaluation on pre and post-contrast MRI or CT for enhancement evaluation as with neoplasm.  No intestinal obstruction.  Mild distension such as at the transverse colon suspicious for ileus.  Small pleural effusions.  Alveolar densities at least in the right base suggesting edema although nonspecific, with cardiomegaly  and postop changes.  Moderate ascites.     IMPRESSION:  1. Abdominopelvic: Increased 3rd spacing of fluid.  Mild colonic ileus. Incidental findings are described above.  2. Head: No acute intracranial abnormality.  Chronic ischemic changes redemonstrated.     Chest x-ray 5/19/2025    IMPRESSION:  1. Cardiomegaly with pulmonary vascular congestion.  2. Pleural based opacity in the right mid lower lung.  3. Subtle bibasilar opacity.             BRIEF SUMMARY OF INITIAL CONSULT:    Briefly Mr. Dorsey is a 59-year-old man with history of CKD stage IIIa, with large proteinuria, probably due to nephrosclerosis, baseline creatinine 1.9-2.1 mg/dL, HTN, CAD status post CABG, HFpEF 55%, valvular heart disease status post AVR and MVR both mechanical valves, post pacemaker placement, hyperlipidemia, CVA, testicular cancer status postchemotherapy, alcohol abuse, cirrhosis, who was admitted on who was admitted on 5/19/2025 after he presented to the ER with complaints of not being able to take care of himself and failure to thrive, in the ER CT head showed no acute abnormalities, CT abdomen pelvis showed mild colonic ileus, prominent subcutaneous edema is worsening, moderate ascites he is well was found to have a supratherapeutic INR >10, his ABGs show a pH of 7.244 with PO2 of 57,  bicarbonate level of 16.3, while in the ER he developed seizure activity and therefore he was admitted to the MICU.  His initial creatinine level was 2.4 mg/dl, and has increased to 2.9 mg/dL, reason for this consultation.    Problems resolved:    Hyperchloremic metabolic acidosis, probably due to GI losses  Hypernatremia, with hypervolemia, needs natriuresis, sodium levels  stable  FRANTZ on CKD, hemodynamically mediated due to heart failure; resolved,   Respiratory failure status post intubation  Hypotension, on midodrine  Probably pneumonia, on ceftriaxone and doxycycline  Supratherapeutic INR    IMPRESSION/RECOMMENDATIONS:        CKD stage IIIa, probably due to nephrosclerosis, baseline creatinine 1.9-2.1 mg/dL renal function stable, creatinine 1.7 mg/dL, probably new baseline.    Recurrent hypernatremia with hypovolemia, to replace free water  HFpEF 50%, proBNP 5566> 7050>6575  Hypokalemia, 2/2 poor intake, to replace   Respiratory alkalosis  Normocytic anemia, with iron deficiency, ferritin 64, iron saturation 10%, folate 10.2, B12 >2000  --------------------------------------    New onset seizures, on Depakote  Cirrhosis, 2/2 Alcohol abuse, on lactulose, rifaximin  CAD status post CABG, AVR and MVR mechanical valves  HLD, on atorvastatin   History of testicular cancer  History of CVA  Nutrition, n.p.o.     Plan:     Start D5W with 20 KCl at 75 mL/h until tube feeds restart  BMP at 4 PM  Continue to monitor renal function  Continue monitor sodium level  Continue to monitor potassium level    Electronically signed by ARNALDO Evans - CNP on 6/5/2025 at 12:17 PM       Omar Joiner MD

## 2025-06-05 NOTE — PROGRESS NOTES
Gastroenterology, Hepatology, &  Advanced Endoscopy    Progress Note  Dr.Manzo AMOR 6/5-/6/6     Subjective: Pt extubated 6/3/25, on 4 liters O2 via NC. Was without sleep per staff for 24 hours. Sleeping current arousable, confused.    Reason for Consult: GI bleeding, hx alcoholic cirrhosis, ? Need for a scope     HPI:   Len Dorsey is a 59 y.o. male w/ PMH of  has a past medical history of Alcoholic liver disease, H/O prosthetic aortic valve replacement, History of mitral valve replacement, Stroke (HCC), and Testicular cancer (HCC). who presents to the ED due to failure to thrive. Patient has a history of alcoholic cirrhosis as well. His hospital course was complicated with upper GI bleeding and acute hypoxic respiratory failure. He was sent to the MICU from the ED and then transferred out of the MICU on 5/22, he was transferred back on 5/23 and intubated there. Patient had been intubated for 9 days. Extubated 6/3/25.      PMH:       Diagnosis Date    Alcoholic liver disease     H/O prosthetic aortic valve replacement 09/2020    [facility]  Mitral and pacemaker at this time as well    History of mitral valve replacement 09/2020    at [facility]  Aortic Valve placed at this time as well as pacemaker    Stroke (HCC)     Testicular cancer (HCC)     in remission since 1988        PSH:       Procedure Laterality Date    COLONOSCOPY N/A 8/24/2022    COLONOSCOPY POLYPECTOMY HOT BIOPSY performed by Rene Lorenzo MD at [facility] ENDOSCOPY    FRACTURE SURGERY      Left Tibial Plateau Fracture 3/29/2022     PACEMAKER PLACEMENT  09/2020    UPPER GASTROINTESTINAL ENDOSCOPY N/A 8/24/2022    EGD DIAGNOSTIC ONLY performed by Rene Lorenzo MD at [facility] ENDOSCOPY    UPPER GASTROINTESTINAL ENDOSCOPY N/A 5/9/2024    ESOPHAGOGASTRODUODENOSCOPY          +++AVAIL 1430+++ performed by Min Mcdaniels DO at [facility] ENDOSCOPY    UPPER GASTROINTESTINAL ENDOSCOPY N/A 2/28/2025    ESOPHAGOGASTRODUODENOSCOPY BIOPSY performed by Priscilla  chloride flush 0.9 % injection 5-40 mL  5-40 mL IntraVENous 2 times per day Charles Wood A, DO   10 mL at 06/05/25 0818    sodium chloride flush 0.9 % injection 5-40 mL  5-40 mL IntraVENous PRN Georges Woodnt A, DO        0.9 % sodium chloride infusion   IntraVENous PRN WoodGeorgesnt A, DO   Stopped at 05/22/25 2105    multivitamin 1 tablet  1 tablet Oral Daily Charles Wood A, DO   1 tablet at 06/04/25 1044    [Held by provider] folic acid (FOLVITE) tablet 1 mg  1 mg Oral Daily Charles Wood A, DO   1 mg at 05/30/25 0942    acetaminophen (TYLENOL) tablet 650 mg  650 mg Oral Q6H PRN Carissa Good APRN - CNP   650 mg at 05/27/25 1045    Or    acetaminophen (TYLENOL) suppository 650 mg  650 mg Rectal Q6H PRN Carissa Good APRN - CNP        [Held by provider] melatonin tablet 3 mg  3 mg Oral Nightly PRN Carissa Good APRN - CNP        ondansetron (ZOFRAN-ODT) disintegrating tablet 4 mg  4 mg Oral Q8H PRN Len Arguello DO        Or    ondansetron (ZOFRAN) injection 4 mg  4 mg IntraVENous Q6H PRN Len Arguello DO        polyethylene glycol (GLYCOLAX) packet 17 g  17 g Oral Daily PRN Len Arguello DO   17 g at 05/21/25 0853    pantoprazole (PROTONIX) injection 40 mg  40 mg IntraVENous BID Caitlyn Sagastume MD   40 mg at 06/05/25 0818    glucose chewable tablet 16 g  4 tablet Oral PRN Caitlyn Sagastume MD        dextrose bolus 10% 125 mL  125 mL IntraVENous PRN Caitlyn Sagastume MD   Stopped at 05/20/25 0005    Or    dextrose bolus 10% 250 mL  250 mL IntraVENous PRN Caitlyn Sagastume MD        glucagon injection 1 mg  1 mg SubCUTAneous PRN Caitlyn Sagastume MD        dextrose 10 % infusion   IntraVENous Continuous PRN Caitlyn Sagastume MD             REVIEW OF SYSTEMS: Pt intubated      PHYSICAL EXAMINATION:   Vitals:    06/05/25 0357 06/05/25 0717 06/05/25 0807 06/05/25 0814   BP: (!) 140/72 (!) 150/92  (!) 140/82   Pulse: (!) 102 (!) 109     Resp: 22 22    found small volume ascites in predominantly perihepatic, perisplenic and pelvic  distribution. Cirrhosis and splenomegaly. Trace bilateral pleural effusions.Cardiomegaly.    CPAP/PS 5 PEEP +5 FiO2 35%        PLAN:  OK for meds through NG  -Pt for PICC/Midline  - Continue to closely monitor INR to therapeutic range. INR currently 1.7   - EGD in 2/28/25  (as noted above) with no high risk portal hypertensive changes. He has portal hypertensive gastropathy which is certainly bleeding in the setting of supratherapeutic INR.  - PPI BID.  - No plans for repeat endoscopic evaluation.   - Will need improved plans for monitoring INR on outpatient basis.   - Monitor for francisco signs of bleeding  -Transfuse to keep Hgb> 7.0, Hgb 7.8->8.1->7.9->8.1->8.5  -Paracentesis PRN  Counts are stable, no francisco bleeding noted.  Follow peripherally      Thank you for including us in the care of this patient. Please do not hesitate to reach out with any questions.    Greater than or equal to 35 minutes was spent providing face-to-face patient care, including:  and coordinating care, reviewing the chart, labs, and diagnostics, as well as medical decision making. Greater than 50% of this time was spent instructing and counseling the patient face to face regarding findings and recommendations.     Discussed with Dr. JAIMES  Plan per Dr. THEODORE Kurtz, MSN,CRNP 6/5/2025 8:51 AM For Dr. JAIMES

## 2025-06-05 NOTE — PROGRESS NOTES
Department of Internal Medicine  Infectious Diseases  Progress Note      C/C:  Resp failure, D glucan antigenemia     All above noted  Pt's extubated, denies fever, chills,SOB, abdomen pain   Afebrile         Current Facility-Administered Medications   Medication Dose Route Frequency Provider Last Rate Last Admin    potassium chloride 20 mEq in dextrose 5 % 1,000 mL infusion   IntraVENous Continuous Adler, Blessing LAROSE APRN - CNP 75 mL/hr at 06/05/25 1347 New Bag at 06/05/25 1347    warfarin placeholder: dosing by pharmacy   Oral RX Placeholder Bay Eugene MD        warfarin (COUMADIN) tablet 1 mg  1 mg Oral Daily Bay Eugene MD   1 mg at 06/04/25 1138    folic acid injection 1 mg  1 mg IntraVENous Daily Arlene Puentes DO   1 mg at 06/05/25 0818    micafungin (MYCAMINE) 100 mg in sodium chloride 0.9 % 100 mL IVPB ([id number])  100 mg IntraVENous Daily Mikael Redding MD   Stopped at 06/05/25 0921    [Held by provider] midodrine (PROAMATINE) tablet 20 mg  20 mg Oral TID WC Angus Davenport MD   20 mg at 05/30/25 1718    levETIRAcetam (KEPPRA) injection 250 mg  250 mg IntraVENous BID Jose Alberto Bourgeois MD   250 mg at 06/05/25 0817    vitamin B-6 (PYRIDOXINE) tablet 50 mg  50 mg Oral Daily Jose Alberto Bourgeois MD   50 mg at 06/04/25 1044    white petrolatum ointment   Topical BID Caitlyn Sagastume MD   Given at 06/05/25 0825    midazolam PF (VERSED) injection 2 mg  2 mg IntraVENous Q4H PRN Caitlyn Sagastume MD   2 mg at 05/28/25 2032    ipratropium 0.5 mg-albuterol 2.5 mg (DUONEB) nebulizer solution 1 Dose  1 Dose Inhalation Q4H WA RT Caitlyn Sagastume MD   1 Dose at 06/05/25 1141    budesonide (PULMICORT) nebulizer suspension 500 mcg  0.5 mg Nebulization BID RT Caitlyn Sagastume MD   500 mcg at 06/05/25 0807    arformoterol tartrate (BROVANA) nebulizer solution 15 mcg  15 mcg Nebulization BID RT Caitlyn Sagastume MD   15 mcg at 06/05/25 0807    [Held by provider] bumetanide (BUMEX) tablet 1  LYMPHSABS 0.92 06/05/2025 05:55 AM    EOSABS 0.39 06/05/2025 05:55 AM    BASOSABS 0.00 06/05/2025 05:55 AM       CMP     Lab Results   Component Value Date/Time     06/05/2025 05:55 AM    K 3.2 06/05/2025 05:55 AM    K 3.9 09/15/2022 03:08 PM     06/05/2025 05:55 AM    CO2 25 06/05/2025 05:55 AM    BUN 43 06/05/2025 05:55 AM    CREATININE 1.7 06/05/2025 05:55 AM    GFRAA >60 09/15/2022 03:08 PM    LABGLOM 48 06/05/2025 05:55 AM    LABGLOM 31 03/05/2024 05:57 AM    GLUCOSE 111 06/05/2025 05:55 AM    CALCIUM 9.2 06/05/2025 05:55 AM    BILITOT 2.7 06/05/2025 05:55 AM    ALKPHOS 257 06/05/2025 05:55 AM    AST 38 06/05/2025 05:55 AM    ALT 30 06/05/2025 05:55 AM         Hepatic Function Panel:    Lab Results   Component Value Date/Time    ALKPHOS 257 06/05/2025 05:55 AM    ALT 30 06/05/2025 05:55 AM    AST 38 06/05/2025 05:55 AM    BILITOT 2.7 06/05/2025 05:55 AM    BILIDIR 1.7 06/05/2025 05:55 AM    IBILI 1.0 06/05/2025 05:55 AM       PT/INR:    Lab Results   Component Value Date/Time    PROTIME 19.3 06/05/2025 05:55 AM    PROTIME 96.0 09/15/2022 01:07 PM    INR 1.7 06/05/2025 05:55 AM       TSH:    Lab Results   Component Value Date/Time    TSH 2.98 05/20/2025 04:31 AM       U/A:    Lab Results   Component Value Date/Time    COLORU Yellow 05/27/2025 12:01 PM    PHUR 5.5 05/27/2025 12:01 PM    PHUR 6.5 07/14/2023 05:43 PM    WBCUA TOO NUMEROUS TO COUNT 05/27/2025 12:01 PM    RBCUA 10 TO 20 05/27/2025 12:01 PM    MUCUS Present 09/15/2022 04:53 PM    BACTERIA 4+ 05/27/2025 12:01 PM    CLARITYU Clear 07/14/2023 05:43 PM    LEUKOCYTESUR LARGE 05/27/2025 12:01 PM    UROBILINOGEN 0.2 05/27/2025 12:01 PM    BILIRUBINUR SMALL 05/27/2025 12:01 PM    BLOODU LARGE 07/14/2023 05:43 PM    GLUCOSEU NEGATIVE 05/27/2025 12:01 PM       ABG:  No results found for: \"OSZ2WGW\", \"BEART\", \"L2PMHYQD\", \"PHART\", \"THGBART\", \"TWP1DOY\", \"PO2ART\", \"JER2EVS\"    MICROBIOLOGY:    Blood culture - neg to date     Sputum culture -                 Specimen Description .SUCTIONED SPUTUM    Direct Exam MODERATE EPITHELIAL CELLS     MANY Polymorphonuclear leukocytes     FEW YEAST    Culture KIMBERLEE ALBICANS Identification by MALDI-TOF MODERATE GROWTH Abnormal      KIMBERLEE DUBLINIENSIS Identification by MALDI-TOF MODERATE GROWTH Abnormal      NORMAL RESPIRATORY JOSEPH   Culture, Respiratory [[id number]] (Abnormal)    Collected: 05/28/25 1641    Updated: 05/30/25 1009    Order Status: Completed    Specimen Source: Sputum, Suctioned     Specimen Description .SUCTIONED SPUTUM    Direct Exam MODERATE Polymorphonuclear leukocytes     RARE EPITHELIAL CELLS     RARE YEAST    Culture KIMBERLEE ALBICANS Identification by MALDI-TOF LIGHT GROWTH Abnormal      KIMBERLEE DUBLINIENSIS Identification by MALDI-TOF LIGHT GROWTH Abnormal      NO NORMAL JOSEPH Abnormal          Component  Ref Range & Units (hover) 5/23/25 1345   1,3 Beta-D-Glucan 91   1,3 Beta-D-Glucan Interp Positive Abnormal    Comment: (NOTE)            Radiology :  CT abdomen and pelvis -  Impression:        Small volume ascites in predominantly perihepatic, perisplenic and pelvic  distribution.    Cirrhosis and splenomegaly.    Trace bilateral pleural effusions.    Cardiomegaly.       IMPRESSION:     Resp failure -s/p extubation   Leukocytosis   Beta D glucan antigenemia / Candida colonziation       RECOMMENDATIONS:      Micafungin 100 mg IV q  24 hrs ( day 10)   No PICC line

## 2025-06-06 NOTE — PROGRESS NOTES
Department of Internal Medicine  Infectious Diseases  Progress Note      C/C:  Resp failure, D glucan antigenemia     All above noted  Pt's extubated, denies fever, chills,SOB, abdomen pain   Afebrile         Current Facility-Administered Medications   Medication Dose Route Frequency Provider Last Rate Last Admin    metoprolol tartrate (LOPRESSOR) tablet 12.5 mg  12.5 mg Oral BID Laila Galindo MD   12.5 mg at 06/06/25 1246    melatonin tablet 6 mg  6 mg Oral Nightly Laila aGlindo MD        potassium chloride 20 mEq in dextrose 5 % 1,000 mL infusion   IntraVENous Continuous Adler, ARNALDO Cook - CNP 75 mL/hr at 06/06/25 0238 New Bag at 06/06/25 0238    warfarin placeholder: dosing by pharmacy   Oral RX Placeholder Bay Eugene MD        warfarin (COUMADIN) tablet 1 mg  1 mg Oral Daily Bay Eugene MD   1 mg at 06/05/25 1833    folic acid injection 1 mg  1 mg IntraVENous Daily Arlene Puentes DO   1 mg at 06/06/25 1023    micafungin (MYCAMINE) 100 mg in sodium chloride 0.9 % 100 mL IVPB ([id number])  100 mg IntraVENous Daily Mikael Redding MD   Stopped at 06/06/25 1204    [Held by provider] midodrine (PROAMATINE) tablet 20 mg  20 mg Oral TID  Angus Davenport MD   20 mg at 05/30/25 1718    levETIRAcetam (KEPPRA) injection 250 mg  250 mg IntraVENous BID Jose Alberto Bourgeois MD   250 mg at 06/06/25 1024    vitamin B-6 (PYRIDOXINE) tablet 50 mg  50 mg Oral Daily Jose Alberto Bourgeois MD   50 mg at 06/06/25 1026    white petrolatum ointment   Topical BID Caitlyn Sagastume MD   Given at 06/06/25 1048    midazolam PF (VERSED) injection 2 mg  2 mg IntraVENous Q4H PRN Caitlyn Sagastume MD   2 mg at 05/28/25 2032    ipratropium 0.5 mg-albuterol 2.5 mg (DUONEB) nebulizer solution 1 Dose  1 Dose Inhalation Q4H WA RT Caitlyn Sagastume MD   1 Dose at 06/06/25 1146    budesonide (PULMICORT) nebulizer suspension 500 mcg  0.5 mg Nebulization BID RT Caitlyn Sagastume MD   500 mcg at 06/06/25 0815     EOSPCT 4 06/06/2025 05:56 AM    BASOPCT 0 06/06/2025 05:56 AM    MONOSABS 1.40 06/06/2025 05:56 AM    LYMPHSABS 0.56 06/06/2025 05:56 AM    EOSABS 0.70 06/06/2025 05:56 AM    BASOSABS 0.00 06/06/2025 05:56 AM       CMP     Lab Results   Component Value Date/Time     06/06/2025 05:56 AM    K 3.3 06/06/2025 05:56 AM    K 3.9 09/15/2022 03:08 PM     06/06/2025 05:56 AM    CO2 22 06/06/2025 05:56 AM    BUN 34 06/06/2025 05:56 AM    CREATININE 1.5 06/06/2025 05:56 AM    GFRAA >60 09/15/2022 03:08 PM    LABGLOM 52 06/06/2025 05:56 AM    LABGLOM 31 03/05/2024 05:57 AM    GLUCOSE 141 06/06/2025 05:56 AM    CALCIUM 9.0 06/06/2025 05:56 AM    BILITOT 2.4 06/06/2025 05:56 AM    ALKPHOS 286 06/06/2025 05:56 AM    AST 28 06/06/2025 05:56 AM    ALT 23 06/06/2025 05:56 AM         Hepatic Function Panel:    Lab Results   Component Value Date/Time    ALKPHOS 286 06/06/2025 05:56 AM    ALT 23 06/06/2025 05:56 AM    AST 28 06/06/2025 05:56 AM    BILITOT 2.4 06/06/2025 05:56 AM    BILIDIR 1.5 06/06/2025 05:56 AM    IBILI 0.9 06/06/2025 05:56 AM       PT/INR:    Lab Results   Component Value Date/Time    PROTIME 20.0 06/06/2025 05:56 AM    PROTIME 96.0 09/15/2022 01:07 PM    INR 1.8 06/06/2025 05:56 AM       TSH:    Lab Results   Component Value Date/Time    TSH 2.98 05/20/2025 04:31 AM       U/A:    Lab Results   Component Value Date/Time    COLORU Yellow 05/27/2025 12:01 PM    PHUR 5.5 05/27/2025 12:01 PM    PHUR 6.5 07/14/2023 05:43 PM    WBCUA TOO NUMEROUS TO COUNT 05/27/2025 12:01 PM    RBCUA 10 TO 20 05/27/2025 12:01 PM    MUCUS Present 09/15/2022 04:53 PM    BACTERIA 4+ 05/27/2025 12:01 PM    CLARITYU Clear 07/14/2023 05:43 PM    LEUKOCYTESUR LARGE 05/27/2025 12:01 PM    UROBILINOGEN 0.2 05/27/2025 12:01 PM    BILIRUBINUR SMALL 05/27/2025 12:01 PM    BLOODU LARGE 07/14/2023 05:43 PM    GLUCOSEU NEGATIVE 05/27/2025 12:01 PM       ABG:  No results found for: \"NHW1ALY\", \"BEART\", \"U4LSMTNO\", \"PHART\", \"THGBART\", \"WZP7YSH\",

## 2025-06-06 NOTE — CARE COORDINATION
Transition of care update. Per rounds, he had 9 beats of v-tach, and was asymptomatic. His K+ and Mg++ to be replaced today, He has an NG with tube feedings. TF will be increased to 50/hr today, and goal is 65. He is on day 10 of 14 of IV Micafungin. He has a fecal management system, and speech is on consult. His discharge plan is [facility]. Precert was started yesterday. Call to Julianne in [facility], and precert is pending. May not return until the weekend. Attending rounded and stated patient will be ok to d/c once auth has returned. Transport form is in the envelope on the soft chart. LOUIE and destination updated. CM will follow.  Electronically signed by Rich Pedro RN on 6/6/2025 at 11:31 AM

## 2025-06-06 NOTE — PATIENT CARE CONFERENCE
[facility] Quality Flow/Interdisciplinary Rounds Progress Note        Quality Flow Rounds held on June 6, 2025    Disciplines Attending:  Bedside Nurse, , , and Nursing Unit Leadership    Len Dorsey was admitted on 5/19/2025 10:40 AM    Anticipated Discharge Date:       Disposition:    Jasvir Score:  Jasvir Scale Score: 19    [facility] RISK OF UNPLANNED READMISSION 2.0             22.3 Total Score        Discussed patient goal for the day, patient clinical progression, and barriers to discharge.  The following Goal(s) of the Day/Commitment(s) have been identified:  Labs - Report Results and reach goal for tube feeding      Yesika Deleon RN  June 6, 2025

## 2025-06-06 NOTE — PROGRESS NOTES
Department of Internal Medicine  Nephrology Attending Consult Note    Events reviewed    SUBJECTIVE: We are following Mr. Dorsey for FRANTZ on CKD.  Patient reports no complaints.    PHYSICAL EXAM:      Vitals:    VITALS:  /81   Pulse 90   Temp 97.5 °F (36.4 °C) (Temporal)   Resp 20   Ht 1.778 m (5' 10\")   Wt 122 kg (268 lb 15.4 oz)   SpO2 91%   BMI 38.59 kg/m²   24HR INTAKE/OUTPUT:    Intake/Output Summary (Last 24 hours) at 6/6/2025 1458  Last data filed at 6/6/2025 1430  Gross per 24 hour   Intake 2046.39 ml   Output 2600 ml   Net -553.61 ml       Constitutional: Patient is awake  HEENT: Pupils equal reactive  Respiratory: Decreased breath sounds at the bases  Cardiovascular/Edema: Heart sounds are regular  Gastrointestinal: Abdomen soft  Neurologic: Nonfocal  Skin: No lesion  Other: + Edema    Scheduled Meds:   metoprolol tartrate  12.5 mg Oral BID    melatonin  6 mg Oral Nightly    warfarin placeholder: dosing by pharmacy   Oral RX Placeholder    warfarin  1 mg Oral Daily    folic acid  1 mg IntraVENous Daily    micafungin  100 mg IntraVENous Daily    [Held by provider] midodrine  20 mg Oral TID WC    levETIRAcetam  250 mg IntraVENous BID    vitamin B-6  50 mg Oral Daily    white petrolatum   Topical BID    ipratropium 0.5 mg-albuterol 2.5 mg  1 Dose Inhalation Q4H WA RT    budesonide  0.5 mg Nebulization BID RT    arformoterol tartrate  15 mcg Nebulization BID RT    [Held by provider] bumetanide  1 mg Oral Daily    thiamine  100 mg IntraVENous Daily    [Held by provider] lactulose  20 g Oral TID    atorvastatin  20 mg Oral Nightly    [Held by provider] rifAXIMin  400 mg Oral TID    [Held by provider] busPIRone  5 mg Oral BID    sodium chloride flush  5-40 mL IntraVENous 2 times per day    multivitamin  1 tablet Oral Daily    [Held by provider] folic acid  1 mg Oral Daily    pantoprazole  40 mg IntraVENous BID     Continuous Infusions:   potassium chloride 20 mEq in dextrose 5 % 1,000 mL infusion  Some areas increased  attenuation probably hyperdense cysts but nonspecific consider evaluation on pre and post-contrast MRI or CT for enhancement evaluation as with neoplasm.  No intestinal obstruction.  Mild distension such as at the transverse colon suspicious for ileus.  Small pleural effusions.  Alveolar densities at least in the right base suggesting edema although nonspecific, with cardiomegaly  and postop changes.  Moderate ascites.     IMPRESSION:  1. Abdominopelvic: Increased 3rd spacing of fluid.  Mild colonic ileus. Incidental findings are described above.  2. Head: No acute intracranial abnormality.  Chronic ischemic changes redemonstrated.     Chest x-ray 5/19/2025    IMPRESSION:  1. Cardiomegaly with pulmonary vascular congestion.  2. Pleural based opacity in the right mid lower lung.  3. Subtle bibasilar opacity.             BRIEF SUMMARY OF INITIAL CONSULT:    Briefly Mr. Dorsey is a 59-year-old man with history of CKD stage IIIa, with large proteinuria, probably due to nephrosclerosis, baseline creatinine 1.9-2.1 mg/dL, HTN, CAD status post CABG, HFpEF 55%, valvular heart disease status post AVR and MVR both mechanical valves, post pacemaker placement, hyperlipidemia, CVA, testicular cancer status postchemotherapy, alcohol abuse, cirrhosis, who was admitted on who was admitted on 5/19/2025 after he presented to the ER with complaints of not being able to take care of himself and failure to thrive, in the ER CT head showed no acute abnormalities, CT abdomen pelvis showed mild colonic ileus, prominent subcutaneous edema is worsening, moderate ascites he is well was found to have a supratherapeutic INR >10, his ABGs show a pH of 7.244 with PO2 of 57, bicarbonate level of 16.3, while in the ER he developed seizure activity and therefore he was admitted to the MICU.  His initial creatinine level was 2.4 mg/dl, and has increased to 2.9 mg/dL, reason for this consultation.    Problems

## 2025-06-06 NOTE — PROGRESS NOTES
CMR reporting 10 beats of V tach.  Pt asymptomatic. VSS.  Dr. Galindo notified.  Kylie Domingo RN

## 2025-06-06 NOTE — DISCHARGE INSTR - COC
Continuity of Care Form    Patient Name: Len Dorsey   :  1965  MRN:  [id number]    Admit date:  2025  Discharge date:  25    Code Status Order: Full Code   Advance Directives:     Admitting Physician:  Ramila Chase MD  PCP: Jama Skinner PA    Discharging Nurse: NAIF Dunbar  Discharging Hospital Unit/Room#: 6505/6505-A  Discharging Unit Phone Number: [phone]    Emergency Contact:   Extended Emergency Contact Information  Primary Emergency Contact: Leonie Moreland  Address: [address] [place] [address]  Home Phone: [phone]  Mobile Phone: [phone]  Relation: Domestic Partner  Preferred language: English   needed? No    Past Surgical History:  Past Surgical History:   Procedure Laterality Date    COLONOSCOPY N/A 2022    COLONOSCOPY POLYPECTOMY HOT BIOPSY performed by Rene Lorenzo MD at [facility]    FRACTURE SURGERY      Left Tibial Plateau Fracture 3/29/2022     PACEMAKER PLACEMENT  2020    UPPER GASTROINTESTINAL ENDOSCOPY N/A 2022    EGD DIAGNOSTIC ONLY performed by Rene Lorenzo MD at [facility]    UPPER GASTROINTESTINAL ENDOSCOPY N/A 2024    ESOPHAGOGASTRODUODENOSCOPY          +++AVAIL 1430+++ performed by Min Mcdaniels DO at [facility]    UPPER GASTROINTESTINAL ENDOSCOPY N/A 2025    ESOPHAGOGASTRODUODENOSCOPY BIOPSY performed by Herbert Wells MD at [facility]       Immunization History:     There is no immunization history on file for this patient.    Active Problems:  Patient Active Problem List   Diagnosis Code    Chest pain R07.9    Closed fracture of nasal bones S02.2XXA    Injury of face S09.93XA    Retroperitoneal hematoma K68.3    Vitamin D deficiency E55.9    Acute chest pain R07.9    Acute cholecystitis K81.0    Gastrointestinal hemorrhage K92.2    Polyp of colon K63.5    Right upper quadrant abdominal pain R10.11    Diarrhea R19.7    Current moderate episode

## 2025-06-06 NOTE — PLAN OF CARE
Problem: Chronic Conditions and Co-morbidities  Goal: Patient's chronic conditions and co-morbidity symptoms are monitored and maintained or improved  6/5/2025 2126 by Glendy Ladd, RN  Outcome: Progressing     Problem: Discharge Planning  Goal: Discharge to home or other facility with appropriate resources  6/5/2025 2126 by Glendy Ladd, RN  Outcome: Progressing

## 2025-06-06 NOTE — PLAN OF CARE
Problem: Chronic Conditions and Co-morbidities  Goal: Patient's chronic conditions and co-morbidity symptoms are monitored and maintained or improved  Outcome: Progressing  Flowsheets (Taken 6/6/2025 3596)  Care Plan - Patient's Chronic Conditions and Co-Morbidity Symptoms are Monitored and Maintained or Improved: Monitor and assess patient's chronic conditions and comorbid symptoms for stability, deterioration, or improvement

## 2025-06-06 NOTE — PROGRESS NOTES
SPEECH/LANGUAGE PATHOLOGY  Clinical Re-Assessment of Swallow Function    PATIENT NAME:  Len Dorsey  (male)     MRN:  [id number]    :  1965  (59 y.o.)      TODAY'S DATE:  2025    EVALUATING THERAPIST: Stephanie Grissom, SLP                 RESULTS:      Patient currently on SLP caseload for dysphagia management.       DYSPHAGIA DIAGNOSIS:   Clinical indicators of moderate-severe pharyngeal phase dysphagia       DIET RECOMMENDATIONS:  NPO -- including medications. Nutrition, fluids and medication to be administered through current NG tube.      Ongoing PO trials w/ SLP only to determine if PO diet can be initiated and/or appropriateness/timing of MBSS if indicated.      FEEDING RECOMMENDATIONS:     Assistance level:  Not applicable      Compensatory strategies recommended: Thorough, frequent oral care to prevent colonization of oral bacteria      Discussed recommendations with:  Patient , patient nurse in person, and case management     PATIENT REPORT/COMPLAINT: patient currently NPO pending results of this evaluation  Current Diet Order:  Diet NPO  ADULT TUBE FEEDING; Nasogastric; Standard with Fiber; Continuous; 10; Yes; 10; Q 4 hours; 65; 80; Q 1 hour; Protein; 1 Dose; Daily    PRIOR LEVEL OF SWALLOW FUNCTION:    PAST HISTORY OF OROPHARYNGEAL DYSPHAGIA?: yes, hx of soft and bite sized consistency solids w/ nectar thick liquids per MBSS on 3/7/25 w/ silent aspiration noted w/ thin liquids.     Home diet: Regular consistency solids (IDDSI level 7) with  thin liquids (IDDSI level 0)  Current Diet Order:  Diet NPO  ADULT TUBE FEEDING; Nasogastric; Standard with Fiber; Continuous; 10; Yes; 10; Q 4 hours; 65; 80; Q 1 hour; Protein; 1 Dose; Daily    PROCEDURE:  Consistencies Administered During the Evaluation   Liquids: nectar thick liquid   Solids:  Pureed       Method of Intake:   spoon, coated spoon  Fed by clinician      Position:   Sitting in bed with head elevated above 75 degrees    CLINICAL

## 2025-06-06 NOTE — PROGRESS NOTES
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     Weight:   Wt Readings from Last 1 Encounters:   05/22/25 122 kg (268 lb 15.4 oz)       TSH:    Lab Results   Component Value Date/Time    TSH 2.98 05/20/2025 04:31 AM       Hepatic Function Panel:                            Lab Results   Component Value Date/Time    ALKPHOS 286 06/06/2025 05:56 AM    ALT 23 06/06/2025 05:56 AM    AST 28 06/06/2025 05:56 AM    BILITOT 2.4 06/06/2025 05:56 AM    BILIDIR 1.5 06/06/2025 05:56 AM    IBILI 0.9 06/06/2025 05:56 AM         Recent Labs     06/04/25  0416 06/05/25  0555 06/06/25  0556   HGB 8.1* 8.5* 8.5*     Date Warfarin Dose INR Comment   5/20 1 mg 3.2 VitK 10 mg x1 on 5/19  Meropenem started   5/21 1 mg- medication held  2 Warfarin held for IR procedure   5/22 1.5 mg 2    5/23 1mg  2.4    5/24 0.5 mg 2.9    5/25 0.5 mg 2.9    5/26 1 mg 2.5    5/27 1.5 mg 2.1    5/28 1.5 mg 2.3    5/29 1.5 mg 2.5    5/30 0.5 mg 3.2    - - - -   6/4 1 mg 1.5 Re-consulted   6/5 1 mg 1.7    6/6 < 1 mg > 1.8      Assessment:  Patient is a 59 y.o. male on warfarin for Atrial Fibrillation, AVR, and mMVR.  Patient's home warfarin dosing regimen is 1 mg daily - there is some questions surrounding compliance.   Goal INR 2.5 - 3.5  Heparin bridge to continue until INR therapeutic    Plan:  Warfarin 1 mg daily for now - pt's last weekly dose was roughly 7.5 mg (inpt) with a therapeutic INR  Daily PT/INR until the INR is stable within the therapeutic range  Pharmacist will follow and monitor/adjust dosing as necessary    Thank you for this consult,    Gonzalo Olmos, PharmD  PGY-1 Pharmacy Practice Resident   6/6/2025 9:34 AM

## 2025-06-06 NOTE — PROGRESS NOTES
[facility] Hospitalist Progress Note    Admitting Date and Time: 5/19/2025 10:40 AM  Admit Dx: Failure to thrive in adult [R62.7]  FRANTZ (acute kidney injury) [N17.9]  Supratherapeutic INR [R79.1]  Anticoagulated on Coumadin [Z79.01]  Alcohol withdrawal syndrome without complication (HCC) [F10.470]    Synopsis: Patient presented to the emergency department with concerns about not being able to take care of himself.  Patient apparently been canceled doctors appointments and not taking medications.  Has a history of alcohol abuse and is a daily drinker.  Patient states not taking his medications and he ran out of them.  He is not going to doctors appointments because he cannot get a hold of the doctor.  Also reported some shortness of breath.  Denies any fever, chills, nausea, vomiting, chest pain, headache, dizziness, abdominal pain, hematuria.,  Dysuria or constipation or diarrhea.  Vital signs within normal limits and stable.  Although reviewing his vital signs throughout his ER course he did drop to 85% on room air.  He is currently 99% on 3 L nasal cannula.  Laboratory studies demonstrate BUN 22, creatinine 2.4, procalcitonin 0.19, troponin 74 with repeat of 67, proBNP 5566, alk phos 259, hemoglobin 8.3 and a WBC of 12.3.  INR is greater than 10.  CT imaging shows some increased third spacing of fluid.  Chest x-ray shows bibasilar opacities.  Patient was started on ceftriaxone and doxycycline for presumed pneumonia.  He was also noted to have seizure-like activity for which she was given Ativan.  He was also placed on CIWA protocol.  Patient be admitted to the ICU.  Patient continues to require pressors.  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

## 2025-06-07 NOTE — PROGRESS NOTES
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     Weight:   Wt Readings from Last 1 Encounters:   05/22/25 122 kg (268 lb 15.4 oz)       TSH:    Lab Results   Component Value Date/Time    TSH 2.98 05/20/2025 04:31 AM       Hepatic Function Panel:                            Lab Results   Component Value Date/Time    ALKPHOS 304 06/07/2025 05:31 AM    ALT 21 06/07/2025 05:31 AM    AST 27 06/07/2025 05:31 AM    BILITOT 2.0 06/07/2025 05:31 AM    BILIDIR 1.3 06/07/2025 05:31 AM    IBILI 0.7 06/07/2025 05:31 AM         Recent Labs     06/05/25  0555 06/06/25  0556 06/07/25  0531   HGB 8.5* 8.5* 8.7*     Date Warfarin Dose INR Comment   5/20 1 mg 3.2 VitK 10 mg x1 on 5/19  Meropenem started   5/21 1 mg- medication held  2 Warfarin held for IR procedure   5/22 1.5 mg 2    5/23 1mg  2.4    5/24 0.5 mg 2.9    5/25 0.5 mg 2.9    5/26 1 mg 2.5    5/27 1.5 mg 2.1    5/28 1.5 mg 2.3    5/29 1.5 mg 2.5    5/30 0.5 mg 3.2    - - - -   6/4 1 mg 1.5 Re-consulted   6/5 1 mg 1.7    6/6 1 mg 1.8    6/7 < 1.5 mg > 1.6 Heparin gtt     Assessment:  Patient is a 59 y.o. male on warfarin for Atrial Fibrillation, AVR, and mMVR.  Patient's home warfarin dosing regimen is 1 mg daily - there is some questions surrounding compliance.   Goal INR 2.5 - 3.5    Plan:  Warfarin 1.5 mg PO x1  Daily PT/INR until the INR is stable within the therapeutic range  Pharmacist will follow and monitor/adjust dosing as necessary    Thank you for this consult,    Gonzalo Olmos, PharmD  PGY-1 Pharmacy Practice Resident   6/7/2025 9:31 AM

## 2025-06-07 NOTE — PROGRESS NOTES
Department of Internal Medicine  Nephrology Progress Note    Events reviewed    SUBJECTIVE: We are following Mr. Dorsey for FRANTZ on CKD.    PHYSICAL EXAM:      Vitals:    VITALS:  /69   Pulse 98   Temp 97.2 °F (36.2 °C) (Temporal)   Resp 20   Ht 1.778 m (5' 10\")   Wt 122 kg (268 lb 15.4 oz)   SpO2 94%   BMI 38.59 kg/m²   24HR INTAKE/OUTPUT:    Intake/Output Summary (Last 24 hours) at 6/7/2025 1100  Last data filed at 6/7/2025 0613  Gross per 24 hour   Intake 2324 ml   Output 4800 ml   Net -2476 ml       Constitutional: Patient is awake  HEENT: Pupils equal reactive  Respiratory: Decreased breath sounds at the bases  Cardiovascular/Edema: Heart sounds are regular  Gastrointestinal: Abdomen soft  Neurologic: Nonfocal  Skin: No lesion  Other: + Edema    Scheduled Meds:   warfarin  1.5 mg Oral Once    heparin (porcine)  60 Units/kg IntraVENous Once    metoprolol tartrate  12.5 mg Oral BID    melatonin  6 mg Oral Nightly    Vitamin D  1,000 Units Oral Daily    warfarin placeholder: dosing by pharmacy   Oral RX Placeholder    folic acid  1 mg IntraVENous Daily    micafungin  100 mg IntraVENous Daily    [Held by provider] midodrine  20 mg Oral TID WC    levETIRAcetam  250 mg IntraVENous BID    vitamin B-6  50 mg Oral Daily    white petrolatum   Topical BID    ipratropium 0.5 mg-albuterol 2.5 mg  1 Dose Inhalation Q4H WA RT    budesonide  0.5 mg Nebulization BID RT    arformoterol tartrate  15 mcg Nebulization BID RT    [Held by provider] bumetanide  1 mg Oral Daily    thiamine  100 mg IntraVENous Daily    [Held by provider] lactulose  20 g Oral TID    atorvastatin  20 mg Oral Nightly    [Held by provider] rifAXIMin  400 mg Oral TID    [Held by provider] busPIRone  5 mg Oral BID    sodium chloride flush  5-40 mL IntraVENous 2 times per day    multivitamin  1 tablet Oral Daily    [Held by provider] folic acid  1 mg Oral Daily    pantoprazole  40 mg IntraVENous BID     Continuous Infusions:   heparin (PORCINE)  consultation.    Problems resolved:    Hyperchloremic metabolic acidosis, probably due to GI losses  Hypernatremia, with hypervolemia, needs natriuresis, sodium levels  stable  FRANTZ on CKD, hemodynamically mediated due to heart failure; resolved,   Respiratory failure status post intubation  Hypotension, on midodrine  Probably pneumonia, on ceftriaxone and doxycycline  Supratherapeutic INR  Recurrent hypernatremia with hypovolemia, to replace free water  Hypokalemia, 2/2 poor intake, to replace     IMPRESSION/RECOMMENDATIONS:        CKD stage IIIa, probably due to nephrosclerosis, baseline creatinine 1.9-2.1 mg/dL renal function stable, creatinine 1.5 mg/dL, probably new baseline.    HFpEF 50%, proBNP 5566> 7050>6575  Respiratory alkalosis  Normocytic anemia, with iron deficiency, ferritin 64, iron saturation 10%, folate 10.2, B12 >2000  --------------------------------------    New onset seizures, on Depakote  Cirrhosis, 2/2 Alcohol abuse, on lactulose, rifaximin  CAD status post CABG, AVR and MVR mechanical valves  HLD, on atorvastatin   History of testicular cancer  History of CVA  Nutrition, TF 65 mL/h with free water flushes 80 mL/h     Plan:     Continue to monitor renal function  Continue monitor sodium level  Continue to monitor potassium level  DC planning     Electronically signed by ARNALDO VILLANUEVA NP on 6/7/2025 at 11:00 AM   MD:  I saw and evaluated the patient, performing the key elements of the service. I discussed the findings, assessment and plan with NP and agree with her findings and plans as documented in her note.

## 2025-06-07 NOTE — PROGRESS NOTES
SPEECH LANGUAGE PATHOLOGY  DAILY PROGRESS NOTE      PATIENT NAME:  Len Dorsey      :  1965          TODAY'S DATE:  2025 ROOM:  6505/6505-A    Current Diet: Diet NPO  ADULT TUBE FEEDING; Nasogastric; Standard with Fiber; Continuous; 10; Yes; 10; Q 4 hours; 65; 80; Q 1 hour; Protein; 1 Dose; Daily    Patient seen for ongoing dysphagia tx. Patient currently NPO and RN cleared patient for participation. Patient with delayed swallow response and immediate and latent coughing with spoon trials of thin liquids.     Recommendation: cont NPO with ongoing PO analysis by SLP       CPT code(s) 09417  dysphagia tx  Total minutes :  10 minutes    Elham Barron M.S. CCC-SLP/L  Speech Language Pathologist  [id number]

## 2025-06-07 NOTE — PROGRESS NOTES
Pt removed FMS.  Limited output over last 2 days.  Dr. Galindo notified.  No plan to replace at this time  Kylie Domingo RN

## 2025-06-07 NOTE — PROGRESS NOTES
APTT 38.1  2,000 unit bolus given  Increase rate by 2 units to 10.7 unit/kg/hr  Next APTT 0000 6/08  Kylie Domnigo RN

## 2025-06-07 NOTE — PROGRESS NOTES
Per Dr. Galindo, Warfarin scheduled dose to be given in addition to current Heparin drip.  Kylie Domingo RN

## 2025-06-07 NOTE — PROGRESS NOTES
Per pharmacy, [facility], heparin infusion to begin at 8.7 units/kg/hr based on current weight of 115.2kg.  Initial APTT 33.6  Initial bolus 4,000 units  Klyie Domingo RN

## 2025-06-07 NOTE — PLAN OF CARE
Problem: Chronic Conditions and Co-morbidities  Goal: Patient's chronic conditions and co-morbidity symptoms are monitored and maintained or improved  6/6/2025 2110 by Glendy Ladd RN  Outcome: Progressing

## 2025-06-07 NOTE — PROGRESS NOTES
[facility] Hospitalist Progress Note    Admitting Date and Time: 5/19/2025 10:40 AM  Admit Dx: Failure to thrive in adult [R62.7]  FRANTZ (acute kidney injury) [N17.9]  Supratherapeutic INR [R79.1]  Anticoagulated on Coumadin [Z79.01]  Alcohol withdrawal syndrome without complication (HCC) [F10.610]    Synopsis: Patient presented to the emergency department with concerns about not being able to take care of himself.  Patient apparently been canceled doctors appointments and not taking medications.  Has a history of alcohol abuse and is a daily drinker.  Patient states not taking his medications and he ran out of them.  He is not going to doctors appointments because he cannot get a hold of the doctor.  Also reported some shortness of breath.  Denies any fever, chills, nausea, vomiting, chest pain, headache, dizziness, abdominal pain, hematuria.,  Dysuria or constipation or diarrhea.  Vital signs within normal limits and stable.  Although reviewing his vital signs throughout his ER course he did drop to 85% on room air.  He is currently 99% on 3 L nasal cannula.  Laboratory studies demonstrate BUN 22, creatinine 2.4, procalcitonin 0.19, troponin 74 with repeat of 67, proBNP 5566, alk phos 259, hemoglobin 8.3 and a WBC of 12.3.  INR is greater than 10.  CT imaging shows some increased third spacing of fluid.  Chest x-ray shows bibasilar opacities.  Patient was started on ceftriaxone and doxycycline for presumed pneumonia.  He was also noted to have seizure-like activity for which she was given Ativan.  He was also placed on CIWA protocol.  Patient be admitted to the ICU.  Patient continues to require pressors.  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    5/27 restarted on levophed, bumex held   5/28 - on Levophed, midodrine increased   5/30 - continues on Levophed, intermittently following commands, midodrine increased   5/31 - Bleeding through NG tube, INR      heparin (porcine), 2,000 Units, PRN  heparin (porcine), 4,000 Units, PRN  midazolam, 2 mg, Q4H PRN  white petrolatum, , BID PRN  sodium chloride flush, 5-40 mL, PRN  sodium chloride, , PRN  acetaminophen, 650 mg, Q6H PRN   Or  acetaminophen, 650 mg, Q6H PRN  [Held by provider] melatonin, 3 mg, Nightly PRN  ondansetron, 4 mg, Q8H PRN   Or  ondansetron, 4 mg, Q6H PRN  polyethylene glycol, 17 g, Daily PRN  glucose, 4 tablet, PRN  dextrose bolus, 125 mL, PRN   Or  dextrose bolus, 250 mL, PRN  glucagon (rDNA), 1 mg, PRN  dextrose, , Continuous PRN         Objective:    BP (!) 130/51   Pulse (!) 108   Temp 97.4 °F (36.3 °C) (Temporal)   Resp 18   Ht 1.778 m (5' 10\")   Wt 115.2 kg (254 lb)   SpO2 93%   BMI 36.45 kg/m²     General Appearance: Awake alert not in distress   skin: warm and dry  Head: normocephalic and atraumatic  Eyes: pupils equal, round, and reactive to light, extraocular eye movements intact, conjunctivae normal  Neck: neck supple and non tender without mass   Pulmonary/Chest: clear to auscultation bilaterally- no wheezes, rales or rhonchi,   Cardiovascular: normal rate, normal S1 and S2 and no carotid bruits  Abdomen: soft, non-tender, non-distended, normal bowel sounds, no masses or organomegaly  Extremities: no cyanosis, no clubbing and no edema  Neurologic: 4/5 strength upper extremities         Recent Labs     06/05/25  1605 06/06/25  0556 06/07/25  0531    143 141   K 3.6 3.3* 3.6   * 109* 107   CO2 20* 22 24   BUN 41* 34* 30*   CREATININE 1.6* 1.5* 1.5*   GLUCOSE 120* 141* 137*   CALCIUM 9.1 9.0 8.8       Recent Labs     06/06/25  0556 06/07/25  0531 06/07/25  1015   WBC 16.1* 15.5* 14.9*   RBC 3.14* 3.20* 3.10*   HGB 8.5* 8.7* 8.4*   HCT 28.8* 29.8* 29.5*   MCV 91.7 93.1 95.2   MCH 27.1 27.2 27.1   MCHC 29.5* 29.2* 28.5*   RDW 23.9* 23.7* 23.8*   MPV Can not be calculated Can not be calculated Can not be calculated     Labs and imaging reviewed.    Assessment:    Acute hypoxic  respiratory failure  Acute encephalopathy  Septic shock unclear etiology  Beta D glucan antigenemia  Supratherapeutic INR  Delirium tremens with alcohol withdrawal syndrome  New onset seizure  FRANTZ on CKD 3a  Hypotension, on midodrine  Liver cirrhosis  Acute on Chronic anemia  Chronic alcoholic  History of heart valve replacement on warfarin  History of AICD  Elevated QTc  Multiple falls  History of CVA  Hypernatremia   Concern for GI bleed - NG tube with blood output 5/31  Dysphagia - NG in place       Plan:  Transferred out of the ICU on 5/22, transferred back to the ICU 5/23, intubated and sedated, extubated 6/30, continue breathing treatments   Neurology consulted, on Keppra, high-dose thiamine, CIWA protocol, unable to have MRI, seizure precautions. EEG completed 5/23- moderate nonspecific encephalopathy, No seizures or epileptiform discharges were noted during this study.  Currently on Keppra  Nephrology following for FRANTZ on CKD, off bumex   Status post vitamin K, monitoring PT/INR, heparin bridging ; pharmacy dosing warfarin.   GI consulted, serial H&H, no plans for EGD    On lactulose, rifaximin   N.p.o., TF, SLP to see today.   On D5 infusion   ID following, off aztreonam currently s/p micafungin   Monitor LFT's, right upper quadrant ultrasound with cholelithiasis, no signs of cholecystitis, steatosis.          Code Status: Full Code  DVT/GI Prophylaxis: Heparin     Dispo: Pending precert to select;     NOTE: This report was transcribed using voice recognition software. Every effort was made to ensure accuracy; however, inadvertent computerized transcription errors may be present.  Electronically signed by Laila Galnido MD on 6/7/2025 at 1:50 PM

## 2025-06-07 NOTE — PROGRESS NOTES
Department of Internal Medicine  Infectious Diseases  Progress Note      C/C:  Resp failure, D glucan antigenemia     All above noted  Pt's extubated, denies fever, chills,SOB, abdomen pain   Afebrile         Current Facility-Administered Medications   Medication Dose Route Frequency Provider Last Rate Last Admin    warfarin (COUMADIN) tablet 1.5 mg  1.5 mg Oral Once Bay Eugene MD        heparin (porcine) injection 2,000 Units  2,000 Units IntraVENous PRN Laila Galindo MD        heparin (porcine) injection 4,000 Units  4,000 Units IntraVENous PRN Laila Galindo MD        heparin 25,000 units in dextrose 5% 250 mL (premix) infusion  5-30 Units/kg/hr IntraVENous Continuous Laila Galindo MD 10 mL/hr at 06/07/25 1143 8.7 Units/kg/hr at 06/07/25 1143    metoprolol tartrate (LOPRESSOR) tablet 12.5 mg  12.5 mg Oral BID Laila Galindo MD   12.5 mg at 06/07/25 0851    melatonin tablet 6 mg  6 mg Oral Nightly Laila Galindo MD   6 mg at 06/06/25 2135    Vitamin D (CHOLECALCIFEROL) tablet 1,000 Units  1,000 Units Oral Daily Laila Galindo MD   1,000 Units at 06/07/25 0851    warfarin placeholder: dosing by pharmacy   Oral RX Placeholder Bay Eugene MD        folic acid injection 1 mg  1 mg IntraVENous Daily Arlene Puentes DO   1 mg at 06/07/25 0908    micafungin (MYCAMINE) 100 mg in sodium chloride 0.9 % 100 mL IVPB ([id number])  100 mg IntraVENous Daily Mikael Redding MD   Stopped at 06/07/25 0954    [Held by provider] midodrine (PROAMATINE) tablet 20 mg  20 mg Oral TID  Angus Davenport MD   20 mg at 05/30/25 1718    levETIRAcetam (KEPPRA) injection 250 mg  250 mg IntraVENous BID Jose Alberto Bourgeois MD   250 mg at 06/07/25 0850    vitamin B-6 (PYRIDOXINE) tablet 50 mg  50 mg Oral Daily Jose Alberto Bourgeois MD   50 mg at 06/07/25 0851    white petrolatum ointment   Topical BID Caitlyn Sagastume MD   Given at 06/07/25 0910    midazolam PF (VERSED) injection 2 mg  2 mg IntraVENous Q4H PRN  Caitlyn Sagastume MD   2 mg at 06/07/25 0628    ipratropium 0.5 mg-albuterol 2.5 mg (DUONEB) nebulizer solution 1 Dose  1 Dose Inhalation Q4H WA RT Caitlyn Sagastume MD   1 Dose at 06/07/25 1124    budesonide (PULMICORT) nebulizer suspension 500 mcg  0.5 mg Nebulization BID RT Caitlyn Sagastume MD   500 mcg at 06/07/25 0837    arformoterol tartrate (BROVANA) nebulizer solution 15 mcg  15 mcg Nebulization BID RT Caitlyn Sagastume MD   15 mcg at 06/07/25 0837    [Held by provider] bumetanide (BUMEX) tablet 1 mg  1 mg Oral Daily RosieValentín murray MD   1 mg at 05/26/25 0806    thiamine (B-1) injection 100 mg  100 mg IntraVENous Daily Aurelio Montoya MD   100 mg at 06/07/25 0851    [Held by provider] lactulose (CHRONULAC) 10 GM/15ML solution 20 g  20 g Oral TID Jaja Moreno MD   20 g at 05/30/25 2045    white petrolatum ointment   Topical BID PRN Bay Eugene MD        atorvastatin (LIPITOR) tablet 20 mg  20 mg Oral Nightly Carissa Good APRN - CNP   20 mg at 06/06/25 2134    [Held by provider] rifAXIMin (XIFAXAN) tablet 400 mg  400 mg Oral TID Carissa Good APRN - CNP   400 mg at 05/30/25 2047    [Held by provider] busPIRone (BUSPAR) tablet 5 mg  5 mg Oral BID Jaja Moreno MD   5 mg at 05/30/25 2041    sodium chloride flush 0.9 % injection 5-40 mL  5-40 mL IntraVENous 2 times per day Charles Wood DO   10 mL at 06/07/25 0851    sodium chloride flush 0.9 % injection 5-40 mL  5-40 mL IntraVENous PRN Charles Wood DO        0.9 % sodium chloride infusion   IntraVENous PRN Charles Wood DO   Stopped at 05/22/25 2105    multivitamin 1 tablet  1 tablet Oral Daily Charles Wood DO   1 tablet at 06/07/25 0851    [Held by provider] folic acid (FOLVITE) tablet 1 mg  1 mg Oral Daily Charles Wood DO   1 mg at 05/30/25 0942    acetaminophen (TYLENOL) tablet 650 mg  650 mg Oral Q6H PRN Carissa Good APRN - CNP   650 mg at 05/27/25 1045    Or    acetaminophen

## 2025-06-08 NOTE — PROGRESS NOTES
Pt due for Xifaxan.  Medication needed from pharmacy.  RN contacted pharmacy via phone 0930. Med to be sent to floor.  1100 2nd request sent to pharmacy via MAR.  Kylie Domingo RN

## 2025-06-08 NOTE — PROGRESS NOTES
[facility] Hospitalist Progress Note    Admitting Date and Time: 5/19/2025 10:40 AM  Admit Dx: Failure to thrive in adult [R62.7]  FRANTZ (acute kidney injury) [N17.9]  Supratherapeutic INR [R79.1]  Anticoagulated on Coumadin [Z79.01]  Alcohol withdrawal syndrome without complication (HCC) [F10.000]    Synopsis: Patient presented to the emergency department with concerns about not being able to take care of himself.  Patient apparently been canceled doctors appointments and not taking medications.  Has a history of alcohol abuse and is a daily drinker.  Patient states not taking his medications and he ran out of them.  He is not going to doctors appointments because he cannot get a hold of the doctor.  Also reported some shortness of breath.  Denies any fever, chills, nausea, vomiting, chest pain, headache, dizziness, abdominal pain, hematuria.,  Dysuria or constipation or diarrhea.  Vital signs within normal limits and stable.  Although reviewing his vital signs throughout his ER course he did drop to 85% on room air.  He is currently 99% on 3 L nasal cannula.  Laboratory studies demonstrate BUN 22, creatinine 2.4, procalcitonin 0.19, troponin 74 with repeat of 67, proBNP 5566, alk phos 259, hemoglobin 8.3 and a WBC of 12.3.  INR is greater than 10.  CT imaging shows some increased third spacing of fluid.  Chest x-ray shows bibasilar opacities.  Patient was started on ceftriaxone and doxycycline for presumed pneumonia.  He was also noted to have seizure-like activity for which she was given Ativan.  He was also placed on CIWA protocol.  Patient be admitted to the ICU.  Patient continues to require pressors.  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    5/27 restarted on levophed, bumex held   5/28 - on Levophed, midodrine increased   5/30 - continues on Levophed, intermittently following commands, midodrine increased   5/31 - Bleeding through NG tube, INR  provider] folic acid  1 mg Oral Daily    pantoprazole  40 mg IntraVENous BID     heparin (porcine), 2,000 Units, PRN  heparin (porcine), 4,000 Units, PRN  white petrolatum, , BID PRN  sodium chloride flush, 5-40 mL, PRN  sodium chloride, , PRN  acetaminophen, 650 mg, Q6H PRN   Or  acetaminophen, 650 mg, Q6H PRN  [Held by provider] melatonin, 3 mg, Nightly PRN  ondansetron, 4 mg, Q8H PRN   Or  ondansetron, 4 mg, Q6H PRN  polyethylene glycol, 17 g, Daily PRN  glucose, 4 tablet, PRN  dextrose bolus, 125 mL, PRN   Or  dextrose bolus, 250 mL, PRN  glucagon (rDNA), 1 mg, PRN  dextrose, , Continuous PRN         Objective:    BP 97/61   Pulse 63   Temp 97.1 °F (36.2 °C) (Temporal)   Resp 20   Ht 1.778 m (5' 10\")   Wt 115.2 kg (254 lb)   SpO2 99%   BMI 36.45 kg/m²     General Appearance: Awake alert not in distress   skin: warm and dry  Head: normocephalic and atraumatic  Eyes: pupils equal, round, and reactive to light, extraocular eye movements intact, conjunctivae normal  Neck: neck supple and non tender without mass   Pulmonary/Chest: clear to auscultation bilaterally- no wheezes, rales or rhonchi,   Cardiovascular: normal rate, normal S1 and S2 and no carotid bruits  Abdomen: soft, non-tender, non-distended, normal bowel sounds, no masses or organomegaly  Extremities: no cyanosis, no clubbing and no edema  Neurologic: 4/5 strength upper extremities         Recent Labs     06/07/25  0531 06/07/25  2320 06/07/25  2337 06/08/25  0550     --  141 141   K 3.6 4.07 4.2 4.3     --  107 107   CO2 24  --  23 22   BUN 30*  --  25* 26*   CREATININE 1.5*  --  1.3* 1.4*   GLUCOSE 137*  --  138* 110*   CALCIUM 8.8  --  7.8* 8.7       Recent Labs     06/07/25  1015 06/07/25  2337 06/08/25  0550   WBC 14.9* 13.9* 12.9*   RBC 3.10* 2.95* 3.28*   HGB 8.4* 8.0* 8.8*   HCT 29.5* 27.8* 31.2*   MCV 95.2 94.2 95.1   MCH 27.1 27.1 26.8   MCHC 28.5* 28.8* 28.2*   RDW 23.8* 23.4* 23.5*   MPV Can not be calculated Can not be

## 2025-06-08 NOTE — PROGRESS NOTES
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     Weight:   Wt Readings from Last 1 Encounters:   06/07/25 115.2 kg (254 lb)       TSH:    Lab Results   Component Value Date/Time    TSH 2.98 05/20/2025 04:31 AM       Hepatic Function Panel:                            Lab Results   Component Value Date/Time    ALKPHOS 315 06/08/2025 05:50 AM    ALT 21 06/08/2025 05:50 AM    AST 36 06/08/2025 05:50 AM    BILITOT 1.8 06/08/2025 05:50 AM    BILIDIR 1.1 06/08/2025 05:50 AM    IBILI 0.7 06/08/2025 05:50 AM         Recent Labs     06/07/25  1015 06/07/25  2337 06/08/25  0550   HGB 8.4* 8.0* 8.8*     Date Warfarin Dose INR Comment   5/20 1 mg 3.2 VitK 10 mg x1 on 5/19  Meropenem started   5/21 1 mg- medication held  2 Warfarin held for IR procedure   5/22 1.5 mg 2    5/23 1mg  2.4    5/24 0.5 mg 2.9    5/25 0.5 mg 2.9    5/26 1 mg 2.5    5/27 1.5 mg 2.1    5/28 1.5 mg 2.3    5/29 1.5 mg 2.5    5/30 0.5 mg 3.2    - - - -   6/4 1 mg 1.5 Re-consulted   6/5 1 mg 1.7    6/6 1 mg 1.8    6/7 1.5 mg 1.6 Heparin gtt   6/8 < 1.5 mg > 1.5 Heparin gtt     Assessment:  Patient is a 59 y.o. male on warfarin for Atrial Fibrillation, AVR, and mMVR.  Patient's home warfarin dosing regimen is 1 mg daily - there is some questions surrounding compliance.   Goal INR 2.5 - 3.5  6/8: RRT overnight for AMS and respiratory distress    Plan:  Warfarin 1.5 mg PO x1  Daily PT/INR until the INR is stable within the therapeutic range  Pharmacist will follow and monitor/adjust dosing as necessary    Thank you for this consult,    Gonzalo Olmos, PharmD  PGY-1 Pharmacy Practice Resident   6/8/2025 7:51 AM

## 2025-06-08 NOTE — PLAN OF CARE
Problem: Chronic Conditions and Co-morbidities  Goal: Patient's chronic conditions and co-morbidity symptoms are monitored and maintained or improved  6/8/2025 1004 by Kylie Domingo RN  Outcome: Progressing  Flowsheets (Taken 6/8/2025 0758)  Care Plan - Patient's Chronic Conditions and Co-Morbidity Symptoms are Monitored and Maintained or Improved: Monitor and assess patient's chronic conditions and comorbid symptoms for stability, deterioration, or improvement  6/7/2025 2119 by Glendy Ladd RN  Outcome: Progressing  6/7/2025 2119 by Glendy Ladd RN  Outcome: Progressing  Flowsheets (Taken 6/7/2025 0742 by Kylie Domingo, RN)  Care Plan - Patient's Chronic Conditions and Co-Morbidity Symptoms are Monitored and Maintained or Improved: Collaborate with multidisciplinary team to address chronic and comorbid conditions and prevent exacerbation or deterioration     Problem: Discharge Planning  Goal: Discharge to home or other facility with appropriate resources  6/8/2025 1004 by Kylie Domingo RN  Outcome: Progressing  Flowsheets (Taken 6/8/2025 0758)  Discharge to home or other facility with appropriate resources: Identify barriers to discharge with patient and caregiver  6/7/2025 2119 by Glendy Ladd RN  Outcome: Progressing  Flowsheets (Taken 6/7/2025 0742 by Kylie Domingo RN)  Discharge to home or other facility with appropriate resources: Identify barriers to discharge with patient and caregiver     Problem: Safety - Adult  Goal: Free from fall injury  Outcome: Progressing     Problem: Pain  Goal: Verbalizes/displays adequate comfort level or baseline comfort level  Outcome: Progressing  Flowsheets (Taken 6/8/2025 0758)  Verbalizes/displays adequate comfort level or baseline comfort level: Encourage patient to monitor pain and request assistance     Problem: Skin/Tissue Integrity  Goal: Skin integrity remains intact  Description: 1.  Monitor for areas of redness and/or skin breakdown2.

## 2025-06-08 NOTE — SIGNIFICANT EVENT
Rapid Response Team Note  Date of event: 6/7/2025   Time of event: 11:30 PM  Len Dorsey 59 y.o. year old male   YOB: 1965   Admit date:  5/19/2025   Location: [facility]   Witnessed? : [x]Yes  [] No  Monitored? : [x]Yes  [] No  Code status: [x] Full  [] DNR-CCA  []DNR-CC  ______________________________________________________________________  Reason for RRT:    [] RR < 8     [] RR > 28   [] SpO2 <90%   [] HR < 40 bpm   [] HR > 130 bpm  [] SBP < 90 mmHg    [] SpO2 <90%   [] LOC   [] Seizures    [] Significant Bleeding Event    [x] Other: Altered mental status    Subjective:   CTSP regarding the above event, RRT was called with concerns of respiratory distress and altered mental status.  On arrival, patient was saturating 100% on 2 L nasal cannula.  He was alert, oriented to time and place and person, and was following commands.  Pupils were bilaterally reactive to light and no focal neurological deficit was seen.  Chest x-ray was done, which showed interstitial edema.  ABG showed normal parameters.  No increase in oxygen demand present.  On examination, patient had flapping asterixis present.  He has a known history of decompensated cirrhosis with hepatic encephalopathy.  His rifaximin and lactulose were held as the patient was previously n.p.o. and the patient has not been getting the medication for last several days.     Objective:   Vital signs: Blood pressure one 116 / 75 , heart rate 81 , saturating 100 on 2 L nasal cannula , temperature 97.2   initial Condition:  Conscious   [x] Yes  [] No     Breathing [x] Yes  [] No     Pulse  [x] Yes  [] No    Airway:   [x] Open/ Clear     Intervention: [x] None  [] Pooled secretions     [] Suctioned  [] Stridor      [] Intubation    Lungs:   [x] Symmetrical chest rise/ CTABL Intervention: [] None  [] Use of accessory muscles    [] NIV (CPAP/BiPAP)  [] Cyanosis      [x] Nasal Oxygen/Mask  [] Wheezing       [x] ABG             [] CXR  [] Other:  MYELOPCT 2 05/20/2025 04:31 AM    EOSPCT 5 06/07/2025 05:31 AM    BASOPCT 0 06/07/2025 05:31 AM    MONOSABS 1.68 06/07/2025 05:31 AM    LYMPHSABS 0.85 06/07/2025 05:31 AM    EOSABS 0.70 06/07/2025 05:31 AM    BASOSABS 0.02 06/07/2025 05:31 AM     BMP:    Lab Results   Component Value Date/Time     06/07/2025 05:31 AM    K 4.07 06/07/2025 11:20 PM    K 3.9 09/15/2022 03:08 PM     06/07/2025 05:31 AM    CO2 24 06/07/2025 05:31 AM    BUN 30 06/07/2025 05:31 AM    CREATININE 1.5 06/07/2025 05:31 AM    CALCIUM 8.8 06/07/2025 05:31 AM    GFRAA >60 09/15/2022 03:08 PM    LABGLOM 55 06/07/2025 05:31 AM    LABGLOM 31 03/05/2024 05:57 AM    GLUCOSE 137 06/07/2025 05:31 AM     Magnesium:    Lab Results   Component Value Date/Time    MG 2.1 06/07/2025 05:31 AM           Teams Assessment and Plan:  Acute hepatic encephalopathy 2/2 decompensated cirrhosis  Bilateral pulmonary edema 2/2 heart failure with preserved ejection fraction  Plan:  Patient is hemodynamically stable, no concerns of bleeding in the setting of IV heparin drip, stat CBC has been ordered.   Rifaximin and lactulose resumed, BMP, mag, Phos, ammonia , lactate levels sent stat  Tube feeds on hold for now  1 dose of Lasix given, 40 mg for interstitial pulmonary edema  Attendings notified, no transfer to ICU.       Disposition:  [x] No transfer   [] Transfer to monitor floor  [] Transfer to: [] MICU [] NICU [] CCU [] SICU    Patient’s family updated: [] Yes  [] No   Discussed with:  [x] Critical Care Intensivist: Dr. Rossi      [x] Primary Care Provider: NP on call notified      [] Other: ?    Parul Webster MD PGY-3  6/7/2025 11:51 PM  Attending Physician: Dr. Rossi    Attending Attestation Note:    Patient reviewed with resident medical staff.  Labs and imaging were reviewed and discussed.  All questions were answered.    Aaron Rossi MD

## 2025-06-08 NOTE — PROGRESS NOTES
Department of Internal Medicine  Nephrology Progress Note    Events reviewed    SUBJECTIVE: We are following Mr. Dorsey for FRANTZ on CKD.    PHYSICAL EXAM:      Vitals:    VITALS:  /88   Pulse 89   Temp 98 °F (36.7 °C) (Temporal)   Resp 20   Ht 1.778 m (5' 10\")   Wt 115.2 kg (254 lb)   SpO2 98%   BMI 36.45 kg/m²   24HR INTAKE/OUTPUT:    Intake/Output Summary (Last 24 hours) at 6/8/2025 0933  Last data filed at 6/8/2025 0624  Gross per 24 hour   Intake 2173 ml   Output 2550 ml   Net -377 ml       Constitutional: Patient is awake  HEENT: Pupils equal reactive  Respiratory: Decreased breath sounds at the bases  Cardiovascular/Edema: Heart sounds are regular  Gastrointestinal: Abdomen soft  Neurologic: Nonfocal  Skin: No lesion  Other: + Edema    Scheduled Meds:   warfarin  1.5 mg Oral Once    bumetanide  1 mg IntraVENous BID    lidocaine  1 patch TransDERmal Daily    metoprolol tartrate  12.5 mg Oral BID    melatonin  6 mg Oral Nightly    Vitamin D  1,000 Units Oral Daily    warfarin placeholder: dosing by pharmacy   Oral RX Placeholder    folic acid  1 mg IntraVENous Daily    [Held by provider] midodrine  20 mg Oral TID WC    levETIRAcetam  250 mg IntraVENous BID    vitamin B-6  50 mg Oral Daily    white petrolatum   Topical BID    ipratropium 0.5 mg-albuterol 2.5 mg  1 Dose Inhalation Q4H WA RT    budesonide  0.5 mg Nebulization BID RT    arformoterol tartrate  15 mcg Nebulization BID RT    thiamine  100 mg IntraVENous Daily    lactulose  20 g Oral TID    atorvastatin  20 mg Oral Nightly    rifAXIMin  400 mg Oral TID    [Held by provider] busPIRone  5 mg Oral BID    sodium chloride flush  5-40 mL IntraVENous 2 times per day    multivitamin  1 tablet Oral Daily    [Held by provider] folic acid  1 mg Oral Daily    pantoprazole  40 mg IntraVENous BID     Continuous Infusions:   heparin (PORCINE) Infusion 12.7 Units/kg/hr (06/08/25 0253)    sodium chloride Stopped (05/22/25 2105)    dextrose       PRN

## 2025-06-08 NOTE — PROGRESS NOTES
Michelle Sanches notified pt having intermittent jerking. Pt also having shallow breathing and slightly tachypneic 02 99% 2L

## 2025-06-08 NOTE — PLAN OF CARE
Problem: Chronic Conditions and Co-morbidities  Goal: Patient's chronic conditions and co-morbidity symptoms are monitored and maintained or improved  6/7/2025 2119 by Glendy Ladd RN  Outcome: Progressing

## 2025-06-08 NOTE — PROGRESS NOTES
Department of Internal Medicine  Infectious Diseases  Progress Note      C/C:  Resp failure, D glucan antigenemia     All above noted  Pt's extubated, denies fever, chills,SOB, abdomen pain   Afebrile         Current Facility-Administered Medications   Medication Dose Route Frequency Provider Last Rate Last Admin    warfarin (COUMADIN) tablet 1.5 mg  1.5 mg Oral Once Bay Eugene MD        rifAXIMin (XIFAXAN) tablet 550 mg  550 mg Oral BID Carissa Good APRN - CNP   550 mg at 06/08/25 1248    furosemide (LASIX) tablet 20 mg  20 mg Oral BID Laila Galindo MD        heparin (porcine) injection 2,000 Units  2,000 Units IntraVENous PRN Laila Galindo MD   2,000 Units at 06/08/25 1246    heparin (porcine) injection 4,000 Units  4,000 Units IntraVENous PRN Laila Galindo MD        heparin 25,000 units in dextrose 5% 250 mL (premix) infusion  5-30 Units/kg/hr IntraVENous Continuous Laila Galindo MD 16.9 mL/hr at 06/08/25 1247 14.7 Units/kg/hr at 06/08/25 1247    lidocaine 4 % external patch 1 patch  1 patch TransDERmal Daily Laila Galindo MD   1 patch at 06/08/25 0911    metoprolol tartrate (LOPRESSOR) tablet 12.5 mg  12.5 mg Oral BID Laila Galindo MD   12.5 mg at 06/08/25 0910    melatonin tablet 6 mg  6 mg Oral Nightly Laila Galindo MD   6 mg at 06/07/25 2138    Vitamin D (CHOLECALCIFEROL) tablet 1,000 Units  1,000 Units Oral Daily Laila Galindo MD   1,000 Units at 06/08/25 0910    warfarin placeholder: dosing by pharmacy   Oral RX Placeholder Bay Eugene MD        folic acid injection 1 mg  1 mg IntraVENous Daily Arlene Puentes DO   1 mg at 06/08/25 0904    levETIRAcetam (KEPPRA) injection 250 mg  250 mg IntraVENous BID Jose Alberto Bourgeois MD   250 mg at 06/08/25 0906    vitamin B-6 (PYRIDOXINE) tablet 50 mg  50 mg Oral Daily Batchu, MD Jose Alberto   50 mg at 06/08/25 0911    white petrolatum ointment   Topical BID NifasCaitlyn MD   Given at 06/08/25 0913        Specimen Description .SUCTIONED SPUTUM    Direct Exam MODERATE EPITHELIAL CELLS     MANY Polymorphonuclear leukocytes     FEW YEAST    Culture KIMBERLEE ALBICANS Identification by MALDI-TOF MODERATE GROWTH Abnormal      KIMBERLEE DUBLINIENSIS Identification by MALDI-TOF MODERATE GROWTH Abnormal      NORMAL RESPIRATORY JOSEPH   Culture, Respiratory [[id number]] (Abnormal)    Collected: 05/28/25 1641    Updated: 05/30/25 1009    Order Status: Completed    Specimen Source: Sputum, Suctioned     Specimen Description .SUCTIONED SPUTUM    Direct Exam MODERATE Polymorphonuclear leukocytes     RARE EPITHELIAL CELLS     RARE YEAST    Culture KIMBERLEE ALBICANS Identification by MALDI-TOF LIGHT GROWTH Abnormal      KIMBERLEE DUBLINIENSIS Identification by MALDI-TOF LIGHT GROWTH Abnormal      NO NORMAL JOSEPH Abnormal          Component  Ref Range & Units (hover) 5/23/25 1345   1,3 Beta-D-Glucan 91   1,3 Beta-D-Glucan Interp Positive Abnormal    Comment: (NOTE)            Radiology :  CT abdomen and pelvis -  Impression:        Small volume ascites in predominantly perihepatic, perisplenic and pelvic  distribution.    Cirrhosis and splenomegaly.    Trace bilateral pleural effusions.    Cardiomegaly.       IMPRESSION:     Dyspahgia   Leukocytosis down to 13 K , with eosinophilia   Beta D glucan antigenemia / Candida colonziation       RECOMMENDATIONS:      Monitor off abx   ID sign off

## 2025-06-08 NOTE — PROGRESS NOTES
Pt refusing 1845 APTT recheck.  2 tech attempted - pt refused both.  Michelle Sanches, NP notified - no new orders  Oncoming night shift RN, Qing, notified.  Re attempt APTT check ordered.  Kylie Domingo RN

## 2025-06-08 NOTE — PROGRESS NOTES
Michelle nelson notified of increased swelling in pts L arm. Pt denies any numbness or tingling. Pulse +1 and arm warm to touch.

## 2025-06-09 NOTE — PROGRESS NOTES
RN called lab regarding patient's APTT needing collected. Patient now agreeable to have blood work drawn.      up at the bedside. Per  RN to hold heparin gtt for 10 minutes and she would be back up to draw.     has not returned in over an hour. RN called lab to have someone come up to draw APTT. Per lab they would send out a call to the techs to try to have someone come draw.    Lab at the bedside.
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
## 2025-06-09 NOTE — PLAN OF CARE
Problem: Chronic Conditions and Co-morbidities  Goal: Patient's chronic conditions and co-morbidity symptoms are monitored and maintained or improved  Outcome: Progressing     Problem: Discharge Planning  Goal: Discharge to home or other facility with appropriate resources  Outcome: Progressing     Problem: Safety - Adult  Goal: Free from fall injury  Outcome: Progressing     Problem: Pain  Goal: Verbalizes/displays adequate comfort level or baseline comfort level  Outcome: Progressing     Problem: Skin/Tissue Integrity  Goal: Skin integrity remains intact  Description: 1.  Monitor for areas of redness and/or skin breakdown2.  Assess vascular access sites hourly3.  Every 4-6 hours minimum:  Change oxygen saturation probe site4.  Every 4-6 hours:  If on nasal continuous positive airway pressure, respiratory therapy assess nares and determine need for appliance change or resting period  Outcome: Progressing     Problem: Skin/Tissue Integrity - Adult  Goal: Skin integrity remains intact  Description: 1.  Monitor for areas of redness and/or skin breakdown2.  Assess vascular access sites hourly3.  Every 4-6 hours minimum:  Change oxygen saturation probe site4.  Every 4-6 hours:  If on nasal continuous positive airway pressure, respiratory therapy assess nares and determine need for appliance change or resting period  Outcome: Progressing

## 2025-06-09 NOTE — SIGNIFICANT EVENT
Critical Care - Rapid Response Team Note      Date of event: 6/9/2025   Time of event: 1:29 pm     Len Dorsey 59 y.o. year old male   YOB: 1965     PCP:  Jama Skinner PA     [facility]   Witnessed? : [x]Yes  [] No  Initial Code status: [x] Full  [] DNR-CCA  []DNR-CC    []Limited  ______________________________________________________________________  Reason for RRT:   Altered Mental Status     Chief Complaint for this admission:   Chief Complaint   Patient presents with    Failure To Thrive     Patient not caring for self - canceling doctors appointments and not taking medications. Daily drinker. C/O SOB.        Admit date:  5/19/2025     Admitting Diagnosis: Failure to thrive in adult [R62.7]  FRANTZ (acute kidney injury) [N17.9]  Supratherapeutic INR [R79.1]  Anticoagulated on Coumadin [Z79.01]  Alcohol withdrawal syndrome without complication (HCC) [F10.930]      Subjective: upon our arrival patient was lethargic opens his eyes to chest rub but not following commands but moves all 4 extremities he just had stat CT ABG and BMP and ammonia level,    Labs since last 72 hours:   CBC:   Lab Results   Component Value Date/Time    WBC 13.3 06/09/2025 08:21 AM    RBC 2.89 06/09/2025 08:21 AM    HGB 8.0 06/09/2025 08:21 AM    HCT 27.7 06/09/2025 08:21 AM    MCV 95.8 06/09/2025 08:21 AM    MCH 27.7 06/09/2025 08:21 AM    MCHC 28.9 06/09/2025 08:21 AM    RDW 24.0 06/09/2025 08:21 AM     05/25/2025 04:06 AM    MPV Can not be calculated 06/09/2025 08:21 AM     BMP:    Lab Results   Component Value Date/Time     06/09/2025 08:21 AM    K 4.11 06/09/2025 01:20 PM    K 3.9 09/15/2022 03:08 PM     06/09/2025 08:21 AM    CO2 27 06/09/2025 08:21 AM    BUN 26 06/09/2025 08:21 AM    CREATININE 1.4 06/09/2025 08:21 AM    CALCIUM 8.8 06/09/2025 08:21 AM    GFRAA >60 09/15/2022 03:08 PM    LABGLOM 57 06/09/2025 08:21 AM    LABGLOM 31 03/05/2024 05:57 AM    GLUCOSE 127 06/09/2025  59 y.o. male with  has a past medical history of Alcoholic liver disease, H/O prosthetic aortic valve replacement, History of mitral valve replacement, Stroke (HCC), and Testicular cancer (HCC). who was admitted on 5/19/2025 with admitting diagnosis Failure to thrive in adult [R62.7]  FRANTZ (acute kidney injury) [N17.9]  Supratherapeutic INR [R79.1]  Anticoagulated on Coumadin [Z79.01]  Alcohol withdrawal syndrome without complication (HCC) [F10.930]  .    RRT was called on 6/9/2025 . Initial assessment and interventions as noted above.     Current problems include:   Acute encephalopathy possibly metabolic/ toxic (Hx of alcoholic cirrhosis) vs postictal vs acute stroke    Ammonia was normal 32      Plan:   Stat head CT was done we called Dr Bourgeois,( the neurologist) he looked at images, he did not find any acute ischemic or hemorrhagic stroke.  He believes it might be postictal phase he asked us to order a brain MRI, ABG was normal patient was not in respiratory distress and protecting his airways,   Resume lactulose and rifaximin  Follow B12 and TSH  ?    Code status: [x] Full  [] DNR-CCA  []DNR-CC []Limited    Disposition:  [x] No transfer   [] Transfer to monitor floor  [] Transfer to: [] MICU [] NICU [] CCU [] SICU    Patient’s family updated:     [] Yes  [x] No   Discussed with:  [x] Critical Care Intensivist: MANUEL Crane       [x] Primary Care Provider: Dr Bneedicto Ulloa      [] Other: ?    Melina Manriquez MD PGY-2  6/9/2025 2:23 PM  Attending Physician: MANUEL Sanderson

## 2025-06-09 NOTE — PROGRESS NOTES
Per patient's Leonie gonsalves, patient previously on Keppra and became confused and agitated while taking it. Patient currently A&O to self only and restless. Attending notified of all and new orders placed.     Viviana Connell RN

## 2025-06-09 NOTE — CARE COORDINATION
6/9/2025 Update CM note:  Chart reviewed and case discussed in IDR.  Pt remains on iv heparin gtt bridging w/coumadin.  Has ng w/tube feeds. For u/s L arm for swelling.  Per CM assistant, insurance is denying [facility].  Peer to peer info was given and needed to be completed by noon.  Discussed w/Justina liaison at [facility] and she stated that a [facility] physician will be doing the peer to peer.  Awaiting response of peer to peer. Back-up snf choice is [facility].  CM will follow.  Electronically signed by Korina Pritchett RN CM on 6/9/2025 at 1:09 PM

## 2025-06-09 NOTE — PROGRESS NOTES
Occupational Therapy  OT BEDSIDE TREATMENT NOTE   [facility]  [address], [place]      Date:2025  Patient Name: Len Dorsey  MRN: [id number]  : 1965  Room: [address] [place] [address]-A       Attempted OT session this date:    [] unavailable due to other medical staff currently with pt   [x] on hold per nursing staff, RRT called this afternoon.    [] on hold per nursing staff secondary to lab / radiology results    [] declined treatment  this date due to ____.  Benefits of participation in therapy reviewed with pt.    [] off unit   [] Other:      Continue with current OT P.O.C      Zohra COLBY/L [id number]
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
## 2025-06-09 NOTE — PROGRESS NOTES
RN notified Michelle Sanches NP, via perfect serve of worsening lung sounds and increased breathing effort. SPO2 95% on 2L nasal canula.     Stat chest xray ordered.

## 2025-06-09 NOTE — PROGRESS NOTES
Palliative Care Department  [phone]  Palliative Care Progress Note  Provider Megan Child, APRN - CNP     Len Dorsey  [id number]  Hospital Day: 22  Date of Initial Consult: 5/23/25  Referring Provider: Ramila Chase MD   Palliative Medicine was consulted for assistance with: Goals of Care    HPI:   Len Dorsey is a 59 y.o. with a medical history of alcoholic liver disease, aortic valve replacement, mitral valve replacement, HFrEF 40-45%, CVA, testicular ca s/p chemotherapy, CAD s/p CABG  who was admitted on 5/19/2025 from home with a CHIEF COMPLAINT of altered mental status.  Patient's girlfriend/POA called EMS for patient being confused and \"dazed\".  He has a history of alcoholism, but his last drink was February 17, 2025.  In ER, found to be hypoxic 85%, supratherapeutic INR greater than 10, and had witnessed seizure activity described as generalized jerking with eyes deviated lasting 45 seconds.  CT head negative for acute findings, he was admitted to ICU for closer monitoring.  Neurology and nephrology consulted.  He was stabilized and transferred out of ICU on 5/22, 5/23 returns to ICU for worsening respiratory status and lethargy, intubated.  Palliative medicine consulted for further assistance with goals of care.    6/3: extubated to NC  6/4: transfer out of ICU  6/8: RRT overnight for AMS  6/9: RRT again AMS, CT head negative, cannot have MRI brain d/t pacer.     ASSESSMENT/PLAN:     Pertinent Hospital Diagnoses     Altered mental status  Acute respiratory failure  Supratherapeutic INR  Acute renal failure  Alcoholic liver cirrhosis  CHF      Palliative Care Encounter / Counseling Regarding Goals of Care  Please see detailed goals of care discussion as below  At this time, Len Dorsey, Does Not have capacity for medical decision-making.  Capacity is time limited and situation/question specific  During encounter Leonie was surrogate medical decision-maker  Outcome of goals  of care meeting:   Continue full code  Continue current medical management  Introduced comfort measures/hospice, POA tearful \" we are not ready for hospice yet\"  Code status Full Code  Advanced Directives: POA or living will in [facility]  Surrogate/Legal NOK:  Leonie Moreland (domestic partner/POA) [phone]      Spiritual assessment: no spiritual distress identified  Bereavement and grief: to be determined  Referrals to: none today  SUBJECTIVE:     Current medical issues leading to Palliative Medicine involvement include   Active Hospital Problems    Diagnosis Date Noted    Supratherapeutic INR [R79.1] 02/08/2023     Priority: Medium    Anticoagulated on Coumadin [Z79.01] 02/08/2023     Priority: Medium    Altered mental status [R41.82] 06/01/2025    Seizures (HCC) [R56.9] 05/22/2025       Details of Conversation:    Chart reviewed.  Update received from nursing.  Patient seen at bedside, lethargic, opens eyes to tactile stimuli but falls back asleep immediately and does not follow commands.  No family currently at bedside.  Call placed to POA/girlfriend Leonie.   Updated on his current medical condition and treatment plans, including mentation changes, CT head, inability to complete MRI, medication changes.  Leonie tells me that the patient was on Keppra while at [facility] hospital in the past and it was identified that Keppra caused him altered mental status.  She tells me when they stopped the medication that within 3 days he was alert and oriented back to his baseline.  She feels that his mentation changes now are related to this as well.  Discussed medication changes made by neurology and that Keppra has been stopped.  We discussed his multiple admissions and transfers to ICU requiring intubation, all of which have initially presented this way with his mentation changes.  We discussed potential deterioration again and what her medical wishes would be for the patient if it did. Discussed options of continuing full

## 2025-06-09 NOTE — PROGRESS NOTES
[facility] Hospitalist Progress Note    SYNOPSIS: Patient admitted on 5/19/2025 for Supratherapeutic INR    s: Patient presented to the emergency department with concerns about not being able to take care of himself. Patient apparently been canceled doctors appointments and not taking medications. . INR is greater than 10. CT imaging shows some increased third spacing of fluid. Chest x-ray shows bibasilar opacities. Patient was started on ceftriaxone and doxycycline for presumed pneumonia. He was also noted to have seizure-like activity for which she was given Ativan. He was also placed on CIWA protocol. Patient be admitted to the ICU.  ID consulted and s/p micafungin for beta D glucan antigenemia and Candida colonization       5/27 restarted on levophed, bumex held   5/28 - on Levophed, midodrine increased   5/30 - continues on Levophed, intermittently following commands, midodrine increased   5/31 - Bleeding through NG tube, INR 4.1, given ffp and vitamin K. GI consulted   6/1 still with bleeding per NG but improved. Remains on precedex   6/2 on precedex but awake, following commands, on SBT  6/3 extubated to nasal cannula  6/4 transferred out of the ICU  6/5 to have IR guided PICC line placed, Speech to work with him again today.      6/6-dc plan is select precert pending; pt on eval states he did not have good sleep and is feeling horrible; melatonin added        6/7-stop micafungin per ID; given pt inr is 1.6 (subtherapeutic -goal of 2.5-3.5); heparin drip added for bridging     6/8-pt had rrt overnight for altered mentation  Pt is AAOx3 in am on my evaluation; ammonia wnl  FMS removed yesterday evening   Had BM in am  On iv diuresis; saturating 99% on 2 lo2 by nc      6/9-pt received ativan 1 mg in iv overnight for agitation  Pt minimally responsive in am; neuro consulted  No improvement in mentation in noon  Abg reviewed-normal  Ammonia normal  Procal is downtrending; id signed off post  head-previously unable to have mri   . EEG completed 5/23- moderate nonspecific encephalopathy, No seizures or epileptiform discharges were noted   Continue kepra  Aspiration precautions      Subtherapeutic INR  Initially inr was supratherapeutic; s/p vitamin k  Warfarin Dosing per pharmacy  Continue bridging with heparin  Needs to be on heparin given h/o heart valve replacement    Alcoholic cirhosis  Delirium tremens  Dysphagia on NG tube  S/p neurology evaluation  Started on kepra; neuro signed off  Reconsult neurology  On lactulose and rifaximin -goal bm of 2-3 per day  S/p FMS removal on 6/7  GI signed off; no plans of egd  Monitor HnH  right upper quadrant ultrasound with cholelithiasis, no signs of cholecystitis, steatosis.   Monitor LFT  Continue tube feeds as tolerated  Monitor blood sguar        Septic shock unclear etiology  Beta D glucan antigenemia  S/p  aztreonam and micafungin  ID signed off      Ckd  Monitor creatinine          DVT Prophylaxis [] Lovenox, []  Heparin, [] SCDs, [] Ambulation   GI Prophylaxis [] PPI,  [] H2 Blocker,  [] Carafate,  [] Diet/Tube Feeds   Disposition Patient requires continued admission due to pending clinical improvement; pending neuro recs   MDM [] Low, [] Moderate,[]  High       Medications:  REVIEWED DAILY    Infusion Medications    heparin (PORCINE) Infusion 12.7 Units/kg/hr (06/09/25 0948)    sodium chloride Stopped (05/22/25 2105)    dextrose       Scheduled Medications    [START ON 6/10/2025] vitamin B-6  100 mg Oral Daily    melatonin  10 mg Oral Nightly    rifAXIMin  550 mg Oral BID    furosemide  20 mg Oral BID    lidocaine  1 patch TransDERmal Daily    metoprolol tartrate  12.5 mg Oral BID    Vitamin D  1,000 Units Oral Daily    warfarin placeholder: dosing by pharmacy   Oral RX Placeholder    folic acid  1 mg IntraVENous Daily    levETIRAcetam  250 mg IntraVENous BID    white petrolatum   Topical BID    ipratropium 0.5 mg-albuterol 2.5 mg  1 Dose Inhalation Q4H

## 2025-06-09 NOTE — CODE DOCUMENTATION
Dr. Galindo was on floor to see patient before RRT called and requested neuro to reassess patient .

## 2025-06-09 NOTE — PATIENT CARE CONFERENCE
[facility] Quality Flow/Interdisciplinary Rounds Progress Note        Quality Flow Rounds held on June 9, 2025    Disciplines Attending:  Bedside Nurse, , , and Nursing Unit Leadership    Len Dorsey was admitted on 5/19/2025 10:40 AM    Anticipated Discharge Date:       Disposition:    Jasvir Score:  Jasvir Scale Score: 15    [facility] RISK OF UNPLANNED READMISSION 2.0             22.7 Total Score        Discussed patient goal for the day, patient clinical progression, and barriers to discharge.  The following Goal(s) of the Day/Commitment(s) have been identified:  Labs - Report Results and have no new skin breakdown      Yesika Deleon RN  June 9, 2025

## 2025-06-10 NOTE — PLAN OF CARE
Problem: Chronic Conditions and Co-morbidities  Goal: Patient's chronic conditions and co-morbidity symptoms are monitored and maintained or improved  6/10/2025 1915 by Marlene Francois RN  Outcome: Progressing     Problem: Discharge Planning  Goal: Discharge to home or other facility with appropriate resources  6/10/2025 1915 by Marlene Francois RN  Outcome: Progressing     Problem: Safety - Adult  Goal: Free from fall injury  6/10/2025 1915 by Marlene Francois RN  Outcome: Progressing     Problem: Pain  Goal: Verbalizes/displays adequate comfort level or baseline comfort level  Outcome: Progressing     Problem: Skin/Tissue Integrity  Goal: Skin integrity remains intact  Description: 1.  Monitor for areas of redness and/or skin breakdown2.  Assess vascular access sites hourly3.  Every 4-6 hours minimum:  Change oxygen saturation probe site4.  Every 4-6 hours:  If on nasal continuous positive airway pressure, respiratory therapy assess nares and determine need for appliance change or resting period  Outcome: Progressing     Problem: Nutrition Deficit:  Goal: Optimize nutritional status  Outcome: Progressing  Flowsheets (Taken 6/10/2025 1314 by Wilber Roger, RD, LD)  Nutrient intake appropriate for improving, restoring, or maintaining nutritional needs: Recommend, monitor, and adjust tube feedings and TPN/PPN based on assessed needs     Problem: Neurosensory - Adult  Goal: Achieves stable or improved neurological status  Outcome: Progressing  Goal: Absence of seizures  Outcome: Progressing     Problem: Respiratory - Adult  Goal: Achieves optimal ventilation and oxygenation  Outcome: Progressing     Problem: Skin/Tissue Integrity - Adult  Goal: Skin integrity remains intact  Description: 1.  Monitor for areas of redness and/or skin breakdown2.  Assess vascular access sites hourly3.  Every 4-6 hours minimum:  Change oxygen saturation probe site4.  Every 4-6 hours:  If on nasal continuous positive airway pressure,  respiratory therapy assess nares and determine need for appliance change or resting period  Outcome: Progressing     Problem: Gastrointestinal - Adult  Goal: Minimal or absence of nausea and vomiting  Outcome: Progressing  Goal: Maintains adequate nutritional intake  Outcome: Progressing     Problem: Genitourinary - Adult  Goal: Urinary catheter remains patent  Outcome: Progressing     Problem: Infection - Adult  Goal: Absence of infection during hospitalization  Outcome: Progressing     Problem: Metabolic/Fluid and Electrolytes - Adult  Goal: Electrolytes maintained within normal limits  Outcome: Progressing  Goal: Hemodynamic stability and optimal renal function maintained  Outcome: Progressing     Problem: Musculoskeletal - Adult  Goal: Return mobility to safest level of function  Outcome: Progressing  Goal: Maintain proper alignment of affected body part  Outcome: Progressing     Problem: Confusion  Goal: Confusion, delirium, dementia, or psychosis is improved or at baseline  Description: INTERVENTIONS:1. Assess for possible contributors to thought disturbance, including medications, impaired vision or hearing, underlying metabolic abnormalities, dehydration, psychiatric diagnoses, and notify attending LIP2. [place] high risk fall precautions, as indicated3. Provide frequent short contacts to provide reality reorientation, refocusing and direction4. Decrease environmental stimuli, including noise as appropriate5. Monitor and intervene to maintain adequate nutrition, hydration, elimination, sleep and activity6. If unable to ensure safety without constant attention obtain sitter and review sitter guidelines with assigned personnel7. Initiate Psychosocial CNS and Spiritual Care consult, as indicated  INTERVENTIONS:1. Assess for possible contributors to thought disturbance, including medications, impaired vision or hearing, underlying metabolic abnormalities, dehydration, psychiatric diagnoses, and notify  consult, as indicated  Outcome: Progressing     Problem: ABCDS Injury Assessment  Goal: Absence of physical injury  Outcome: Progressing     Problem: Seizure Precautions  Goal: Remains free of injury related to seizures activity  Outcome: Progressing

## 2025-06-10 NOTE — PROGRESS NOTES
[facility] Hospitalist Progress Note    SYNOPSIS: Patient admitted on 5/19/2025 for Supratherapeutic INR    s: Patient presented to the emergency department with concerns about not being able to take care of himself. Patient apparently been canceled doctors appointments and not taking medications. . INR is greater than 10. CT imaging shows some increased third spacing of fluid. Chest x-ray shows bibasilar opacities. Patient was started on ceftriaxone and doxycycline for presumed pneumonia. He was also noted to have seizure-like activity for which she was given Ativan. He was also placed on CIWA protocol. Patient be admitted to the ICU.  ID consulted and s/p micafungin for beta D glucan antigenemia and Candida colonization       5/27 restarted on levophed, bumex held   5/28 - on Levophed, midodrine increased   5/30 - continues on Levophed, intermittently following commands, midodrine increased   5/31 - Bleeding through NG tube, INR 4.1, given ffp and vitamin K. GI consulted   6/1 still with bleeding per NG but improved. Remains on precedex   6/2 on precedex but awake, following commands, on SBT  6/3 extubated to nasal cannula  6/4 transferred out of the ICU  6/5 to have IR guided PICC line placed, Speech to work with him again today.      6/6-dc plan is select precert pending; pt on eval states he did not have good sleep and is feeling horrible; melatonin added        6/7-stop micafungin per ID; given pt inr is 1.6 (subtherapeutic -goal of 2.5-3.5); heparin drip added for bridging     6/8-pt had rrt overnight for altered mentation  Pt is AAOx3 in am on my evaluation; ammonia wnl  FMS removed yesterday evening   Had BM in am  On iv diuresis; saturating 99% on 2 lo2 by nc      6/9-pt received ativan 1 mg in iv overnight for agitation  Pt minimally responsive in am; neuro consulted  No improvement in mentation in noon  Abg reviewed-normal  Ammonia normal  Procal is downtrending; id signed off post  DAILY    +++++++++++++++++++++++++++++++++++++++++++++++++  Laila Galindo MD  [facility]  [facility], [place]  +++++++++++++++++++++++++++++++++++++++++++++++++  NOTE: This report was transcribed using voice recognition software. Every effort was made to ensure accuracy; however, inadvertent computerized transcription errors may be present.

## 2025-06-10 NOTE — CARE COORDINATION
6/10/2025 Update CM note:  Chart reviewed and case discussed in IDR.  Per Justina, liaison from [facility], denial was upheld after peer to peer was completed stating pt does not have ltac criteria.  Pt remains on iv heparin gtt bridging to coumadin.  INR today 1.5.  Fecal management system has been removed. Pt does have a bridled NG for tube feeds.  Awaiting speech input when repeat swallow eval will be attempted.  Has sitter at bedside today.  [facility] is following for skilled rehab placement.  Updated info sent there via [facility] yesterday and they are reviewing for acceptance.  Pasrr completed. Envelope in soft chart. CM will continue to follow for transition of care needs.  Electronically signed by Korina Pritchett RN on 6/10/2025 at 12:40 PM

## 2025-06-10 NOTE — PROGRESS NOTES
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     Weight:   Wt Readings from Last 1 Encounters:   06/07/25 115.2 kg (254 lb)       TSH:    Lab Results   Component Value Date/Time    TSH 2.98 05/20/2025 04:31 AM       Hepatic Function Panel:                            Lab Results   Component Value Date/Time    ALKPHOS 343 06/10/2025 07:27 AM    ALT 28 06/10/2025 07:27 AM    AST 36 06/10/2025 07:27 AM    BILITOT 1.6 06/10/2025 07:27 AM    BILIDIR 1.0 06/10/2025 07:27 AM    IBILI 0.6 06/10/2025 07:27 AM         Recent Labs     06/07/25  2337 06/08/25  0550 06/09/25  0821   HGB 8.0* 8.8* 8.0*     Date Warfarin Dose INR Comment   5/20 1 mg 3.2 VitK 10 mg x1 on 5/19  Meropenem started   5/21 1 mg- medication held  2 Warfarin held for IR procedure   5/22 1.5 mg 2    5/23 1mg  2.4    5/24 0.5 mg 2.9    5/25 0.5 mg 2.9    5/26 1 mg 2.5    5/27 1.5 mg 2.1    5/28 1.5 mg 2.3    5/29 1.5 mg 2.5    5/30 0.5 mg 3.2    - - - -   6/4 1 mg 1.5 Re-consulted   6/5 1 mg 1.7    6/6 1 mg 1.8    6/7 1.5 mg 1.6 Heparin gtt   6/8 1.5 mg 1.5 Heparin gtt   6/10 < 2.5 mg > 1.5 Heparin gtt     Assessment:  Patient is a 59 y.o. male on warfarin for Atrial Fibrillation, AVR, and mMVR.  Patient's home warfarin dosing regimen is 1 mg daily - there is some questions surrounding compliance.   Goal INR 2.5 - 3.5    Plan:  Warfarin 2.5 mg x1  Daily PT/INR until the INR is stable within the therapeutic range  Pharmacist will follow and monitor/adjust dosing as necessary    Thank you for this consult,    Kip Burris, PharmD  PGY-1 Pharmacy Practice Resident, [phone]  6/10/2025 11:06 AM

## 2025-06-10 NOTE — PROGRESS NOTES
Len Dorsey is a 59 y.o.  male     Neurology following for altered mental status    PMH of alcoholic liver disease, aortic valve replacement, mitral valve replacement, heart failure, previous CVA, testicular cancer s/p chemotherapy, coronary artery disease s/p CABG    Assessment:     Altered mental status  Patient with worsening confusion after Keppra was restarted  Keppra has not been changed to Vimpat  Patient's mental status is improving    Postinfarct seizures    Plan:     Continue Vimpat 50 mg twice daily  Neurology will follow    History of Present Illness:     Patient initially presented to ED on 5/19 for failure to thrive.  He lives at home with his fiancée where he has a history of significant alcohol use although he has not had a drink in 1 month.  Patient reported he had not been taking his meds because he ran out of them and has not been going to his doctors appointments.  Initially neurology saw him 05/20 for AMS and possible seizure.  He has a diagnosis of his poststroke epilepsy and was having alcohol withdrawal which may have lowered his seizure threshold.  This would unlikely cause focal seizures typically.  His EEG on 5/23 showed moderate nonspecific encephalopathy without seizure with subsequent neuro signed off on 5/22 with plans for EEG as outpatient.     We were consulted again on 5/29 for reevaluation for continued AMS.  Renal dose Keppra was continued at that time which was 250 mg twice daily.  High-dose thiamine was also continued and neurology signed off.    Neurology was reconsulted on 6/9/2025 for worsening mentation and code stroke initiation.  Patient was obtunded and altered during the code stroke.  Still unable to have MRI brain and stat CT head was completed which was negative for acute abnormalities    Wife admitted that Keppra causes him altered mental status.  She said this medication was stopped before within 3 days he was back to his baseline    Keppra was discontinued,

## 2025-06-10 NOTE — PROGRESS NOTES
Lab called regarding 0017 aptt not resulted yet and  stated specimen was contaminated and will be cancelled and need re-drawn. New order placed for STAT aptt at this time.

## 2025-06-10 NOTE — PROGRESS NOTES
Physical Therapy  Rx    Name: Len Dorsey  : 1965  MRN: [id number]      Date of Service: 6/10/2025    Evaluating PT:  Florentino Barnard, PT, DPT  [id number]     Room #:  0605/6505-A  Diagnosis:  Failure to thrive in adult [R62.7]  FRANTZ (acute kidney injury) [N17.9]  Supratherapeutic INR [R79.1]  Anticoagulated on Coumadin [Z79.01]  Alcohol withdrawal syndrome without complication (HCC) [F10.930]  PMHx/PSHx:   has a past medical history of Alcoholic liver disease, H/O prosthetic aortic valve replacement, History of mitral valve replacement, Stroke (HCC), and Testicular cancer (HCC).   Procedure/Surgery:  EEG ;  Intubated ;  Extubated 6/3  Precautions:  Falls, NGT, O2, FMS, Cognition/agitation, Hx CVA with L residual per chart, Alarms   Equipment Needs:  TBD    SUBJECTIVE:    Pt unable to provide social history or PLOF at time of initial evaluation.  Pt is a poor historian.    Per chart, pt was unable to care for himself at home.     OBJECTIVE:   Initial Evaluation  Date: 25 Treatment  6/10/25 Short Term/ Long Term   Goals   AM-PAC 6 Clicks     Was pt agreeable to Eval/treatment? Yes  yes    Does pt have pain? No c/o pain none    Bed Mobility  Rolling: Dep  Supine to sit: Attempted - unable/refused   Sit to supine: NT  Scooting: Dep x2 Rolling: Max x 2  Supine to sit:   Max x 2   Sit to supine: Max x 2   Scooting: Max x 2   Rolling: SBA  Supine to sit: SBA  Sit to supine: SBA  Scooting: SBA   Transfers Sit to stand: NT  Stand to sit: NT  Stand pivot: NT Sit to stand: NT  Stand to sit: NT  Stand pivot: NT   Sit to stand: Min A  Stand to sit: Min A  Stand pivot: Min A with AAD   Ambulation    NT NT >50 feet with AAD Min A   Stair negotiation: ascended and descended  NT NT >2 steps with 1 rail Min A   ROM BUE:  Per OT eval   BLE:  WFL     Strength BUE:  Per OT eval   BLE:  grossly >3/5 -- uncooperative with MMT      Balance Sitting EOB:  Unable to assess   Dynamic Standing:  NT  Sitting EOB:   history/social history and prior level of function, completion of standardized testing/informal observation of tasks, assessment of data and education on plan of care and goals.    CPT codes:  [] Low Complexity PT evaluation 64732  [] Moderate Complexity PT evaluation 99936  [] High Complexity PT evaluation 61912  [] PT Re-evaluation 35702  [] Gait training 72608 -- minutes  [] Manual therapy 93847 -- minutes  [x] Therapeutic activities 90703 25 minutes  [] Therapeutic exercises 34327 - minutes  [] Neuromuscular reeducation 41558 -- minutes     Emiliano Stauffer, PT [id number]

## 2025-06-10 NOTE — PROGRESS NOTES
SPEECH LANGUAGE PATHOLOGY  DAILY PROGRESS NOTE        PATIENT NAME:  Len Dorsey      :  1965          TODAY'S DATE:  6/10/2025 ROOM:  6505/6505-A    Patient seen for f/u for dysphagia mgmt. RN cleared patient for tx and PO trials. Patient alert and oriented to self only. Patient received therapeutic trials of nectar thick liquids via tsp and trials of puree textures.  Patient oral stage exhibited min oral stasis after 1st swl that cleared w/ 2nd swl and/or liquid wash, poor-fair bolus formation;manipulation, poor-fair AP tongue propulsion. Patient pharyngeal stage exhibited throat clear x 1 after nectar thick liquids. SLP recommends continuing NPO until MBSS can be completed. Patient w/ hx of dysphagia and silent aspiration of thin liquids (3/2025). MBSS will require a new physician order. Will await order and continue to follow. RN updated in person regarding recommendations.       CPT code(s) 82318  dysphagia tx  Total minutes :  10 minutes       Stephanie Grissom M.S., CCC-SLP  Speech-Language Pathologist  SP. [id number]

## 2025-06-10 NOTE — PROGRESS NOTES
Occupational Therapy  OT BEDSIDE TREATMENT NOTE   [facility]AN[facility]  [address], [place]      Date:6/10/2025  Patient Name: Len Dorsey  MRN: [id number]  : 1965  Room: [address] [place], [place] [address]A     Evaluating OT: Camille Jensen, OTD,  OTR/L; [id number]  Referring Provider: Bay Eugene MD   Specific Provider Orders/Date: OT eval and treat (25)         Diagnosis: Failure to thrive in adult [R62.7]  FRANTZ (acute kidney injury) [N17.9]  Supratherapeutic INR [R79.1]  Anticoagulated on Coumadin [Z79.01]  Alcohol withdrawal syndrome without complication (HCC) [F10.930]      Reason for admission: Pt admitted      Surgery/Procedures: EEG ; Intubated ; Extubated 6/3      Pertinent Medical History:    Past Medical History        Past Medical History:   Diagnosis Date    Alcoholic liver disease      H/O prosthetic aortic valve replacement 2020     [facility]  Mitral and pacemaker at this time as well    History of mitral valve replacement 2020     at [facility]  Aortic Valve placed at this time as well as pacemaker    Stroke (HCC)      Testicular cancer (HCC)       in remission since             *Precautions:  Fall Risk, NGT, , , hx L alesia, Cognition/agitation, Delirium, Alarms      Assessment of current deficits   [x] Functional mobility          [x]ADLs           [x] Strength                  []Cognition   [x] Functional transfers        [x] IADLs         [x] Safety Awareness   [x]Endurance   [] Fine Coordination           [] ROM           [] Vision/perception    []Sensation     []Gross Motor Coordination [x] Balance    [] Delirium                  []Motor Control     [] Communication     OT PLAN OF CARE   OT POC based on physician orders, patient diagnosis and results of clinical assessment.        Frequency/Duration: 1-3 days/wk for 1-2 weeks PRN    Specific OT Treatment Interventions to include:   * Instruction/training on    Bed Mobility  Supine to sit:   Attempted- pt initially agreeable and then adamantly refused.     Sit to supine:   NT     Supine to sit:   Max Ax2     Sit to supine:   Max Ax2                     SBA      Functional Transfers Sit to stand:   NT     Stand to sit:   NT     Stand Pivot:   NT  Unable  Attempted STS from EOB with max Ax2 although unable to clear EOB                     SBA      Functional Mobility NT NT                      SBA         Balance Sitting:     Static: NT    Dynamic: NT  Standing: NT Sitting:     Static: Min A    Dynamic: Max A  Pt tolerated sitting EOB approx 8 min  Standing: NT  Sitting:     Static: S    Dynamic: SBA  Standing: SBA                   Endurance/Activity Tolerance    Poor+ tolerance with light activity.  Poor+ tolerance with light activity.  O2 93% on RA                     WFL  For full ADL   Visual/  Perceptual Appears WFL                                  Comments:  Cleared by RN to see pt. Upon arrival patient supine in bed and agreeable to OT session. At end of session, patient semi supine, wedge placed on L side to correct L later lean with call light and phone within reach, all lines and tubes intact.  Extensive time required for rest breaks and recovery. Overall patient demonstrated decreased independence and safety during completion of ADL and transitional movements    Treatment: Facilitation of bed mobility (education/cues for body mechanics, sequencing and safety), unsupported sitting balance (addressing posture, weight shifting, dynamic reaching to prep for ADL's), functional transfers (w/ education/cues for safety/hand placement).  Therapist facilitated self-care retraining: LB self-care tasks and grooming tasks while educating pt on modified techniques, posture, safety and energy conservation techniques.  Pt would benefit from continued skilled OT to increase safety and independence with completion of ADL/IADL tasks for functional independence and quality of

## 2025-06-10 NOTE — PROGRESS NOTES
Comprehensive Nutrition Assessment    Type and Reason for Visit:  Reassess    Nutrition Recommendations/Plan:   Recommend to continue current tube feeding orders at this time.           Malnutrition Assessment:  Malnutrition Status:  Insufficient data (06/10/25 6006)    Context:  Acute Illness     Findings of the 6 clinical characteristics of malnutrition:  Energy Intake:  No decrease in energy intake (TF currently meeting needs)  Weight Loss:  Unable to assess (d/t possible fluid shifts ; hx of CHF)     Body Fat Loss:  Unable to assess     Muscle Mass Loss:  Unable to assess    Fluid Accumulation:  No fluid accumulation     Strength:  Not Performed    Nutrition Assessment:    Patient remains NPO ; pt also receiving tube feedings at this time ; s/p extubation on 6/3 ; s/p RRT on 6/8 and 6/9 ; noted clinical indicators of moderate-severe pharyngeal phase dysphagia per speech ; adm w/ FTT ; noted AMS and new onset seizure ; ammonia level 32 ; s/p EEG on 5/23 (moderate nonspecific encephalopathy) ; noted alcoholic cirhosis and delirium tremors (alcohol withdrawal syndrome) ; FRANTZ on CKD ; noted acute hypoxic respiratory failure/septic shock/Beta D glucan antigenemia ; wounds noted ; FMS removed ; hx of testicular cancer s/p chemotherapy ; hx of CVA/CHF/COPD/CKD/CHB ; hx of CAD s/p CABG/AVR/MVR ; acute on chronic anemia ; cocncern for GI bleed during admission ; will provide updated recommendations    Nutrition Related Findings:    -I&Os (-7.6 L), 2+ edema, abrasions,  redness to buttocks, A&O x 1, dysphagia, missing teeth, active BS, constipation, diarrhea, NGT w/ TF, hyperglycemia, ammonia 32 ; Wound Type: Multiple, Pressure Injury, Deep Tissue Injury (wounds x 2)       Current Nutrition Intake & Therapies:    Average Meal Intake: NPO     Current Tube Feeding (TF) Orders:  Feeding Route: Nasogastric  Formula: Standard with Fiber  Schedule: Continuous (@65ml/hr)  Feeding Regimen: @65ml/hr  Additives/Modulars:  Edema, Hemodynamic Status, Skin, Weight, Nutrition Focused Physical Findings, Diarrhea, Constipation    Discharge Planning:    Too soon to determine     Wilber Roger RD, LD  Contact: [phone]

## 2025-06-10 NOTE — PROGRESS NOTES
[facility] Quality Flow/Interdisciplinary Rounds Progress Note        Quality Flow Rounds held on Jessica 10, 2025    Disciplines Attending:  Bedside Nurse, , , and Nursing Unit Leadership    Len Dorsey was admitted on 5/19/2025 10:40 AM    Anticipated Discharge Date:       Disposition:    Jasvir Score:  Jasvir Scale Score: 14    [facility] RISK OF UNPLANNED READMISSION 2.0             23.1 Total Score        Discussed patient goal for the day, patient clinical progression, and barriers to discharge.  The following Goal(s) of the Day/Commitment(s) have been identified:  Diagnostics - Report Results and Labs - Report Results      Marita Dorsey RN  Jessica 10, 2025

## 2025-06-10 NOTE — PLAN OF CARE
Problem: Chronic Conditions and Co-morbidities  Goal: Patient's chronic conditions and co-morbidity symptoms are monitored and maintained or improved  6/9/2025 2237 by Marlene Francois RN  Outcome: Progressing     Problem: Discharge Planning  Goal: Discharge to home or other facility with appropriate resources  6/9/2025 2237 by Marlene Francois RN  Outcome: Progressing     Problem: Safety - Adult  Goal: Free from fall injury  6/9/2025 2237 by Marlene Francois RN  Outcome: Progressing     Problem: Pain  Goal: Verbalizes/displays adequate comfort level or baseline comfort level  6/9/2025 2237 by Marlene Francois RN  Outcome: Progressing     Problem: Skin/Tissue Integrity  Goal: Skin integrity remains intact  Description: 1.  Monitor for areas of redness and/or skin breakdown2.  Assess vascular access sites hourly3.  Every 4-6 hours minimum:  Change oxygen saturation probe site4.  Every 4-6 hours:  If on nasal continuous positive airway pressure, respiratory therapy assess nares and determine need for appliance change or resting period  6/9/2025 2237 by Marlene Francois RN  Outcome: Progressing  Flowsheets (Taken 6/9/2025 2235)  Skin Integrity Remains Intact: Monitor for areas of redness and/or skin breakdown     Problem: Nutrition Deficit:  Goal: Optimize nutritional status  6/9/2025 2237 by Marlene Francois RN  Outcome: Progressing     Problem: Neurosensory - Adult  Goal: Achieves stable or improved neurological status  6/9/2025 2237 by Marlene Francois RN  Outcome: Progressing     Problem: Neurosensory - Adult  Goal: Absence of seizures  6/9/2025 2237 by Marlene Francois RN  Outcome: Progressing     Problem: Respiratory - Adult  Goal: Achieves optimal ventilation and oxygenation  6/9/2025 2237 by Marlene Francois RN  Outcome: Progressing     Problem: Skin/Tissue Integrity - Adult  Goal: Skin integrity remains intact  Description: 1.  Monitor for areas of redness and/or skin breakdown2.  Assess vascular access sites hourly3.  Every

## 2025-06-11 NOTE — PROGRESS NOTES
SPEECH/LANGUAGE PATHOLOGY  VIDEOFLUOROSCOPIC STUDY OF SWALLOWING (MBS)   and PLAN OF CARE    PATIENT NAME:  Len Dorsey  (male)     MRN:  [id number]    :  1965  (59 y.o.)  STATUS:  Inpatient: Room 6505/6505-A    TODAY'S DATE:  2025  ORDER DATE, DESCRIPTION AND REFERRING PROVIDER: Laila Galindo MD  FL MODIFIED BARIUM SWALLOW W VIDEO :  6/10/25  REASON FOR REFERRAL: Assess swallow function  EVALUATING THERAPIST: Verito Anna  SUPERVISING THERAPIST: Tiffanie Hargrove M.S.CCC-SLP         RESULTS:    Unable to complete full study  limited participation from PT and refusal to accept ongoing trials of varying consistencies.     DYSPHAGIA DIAGNOSIS:  Clinical indicators of moderate pharyngeal phase dysphagia     DIET RECOMMENDATIONS:  NPO with ongoing PO analysis by SLP only to determine if PO diet can be initiated-- continue all medications/ nutrition via NGT         FEEDING RECOMMENDATIONS:    Assistance level:  not applicable     Compensatory strategies recommended: No strategies are recommended at this time     Discussed recommendations with:  Patient     Laryngeal Penetration and Aspiration:  Penetration WITH aspiration was observed in today's study with  moderately thick liquid (nectar); Penetration post-swallow observed with honey thick liquid.    SPEECH THERAPY  PLAN OF CARE   The dysphagia POC is established based on physician order and dysphagia diagnosis    Skilled SLP intervention for dysphagia management on acute care up to 5 x per week until goals met, pt plateaus in function and/or discharged from hospital      Conditions Requiring Skilled Therapeutic Intervention for dysphagia:    Patient is performing below functional baseline d/t  current acute condition, Multiple diagnoses, multiple medications, and increased dependency upon caregivers.  swallow triggered once bolus head in the pyriform sinus which increases risk of airway compromise  impaired laryngeal closure resulting in  REPORT/COMPLAINT: patient currently NPO pending results of this evaluation    PRIOR LEVEL OF SWALLOW FUNCTION:    Past History of Oropharyngeal Dysphagia?:  yes    Home diet: Soft and bite size consistency solids (IDDSI level 6) with  nectar consistency (mildly thick - IDDSI level 2) liquids  Current Diet Order:  ADULT TUBE FEEDING; Nasogastric; Standard with Fiber; Continuous; 10; Yes; 10; Q 4 hours; 65; 80; Q 4 hours; Protein; 1 Dose; Daily    PROCEDURE:  Consistencies Administered During the Evaluation   Liquids: nectar thick liquid and honey thick liquid   Solids:  Not tolerated due to PT refusal      Method of Intake:   spoon  Fed by clinician      Position:   Lying in bed, elevated with wedges/pillows, but unable to achieve above 75 degrees due to pt discomfort    INSTRUMENTAL ASSESSMENT:    ORAL PREP/ ORAL PHASE:    Decreased bolus formation resulting in observed premature pharyngeal spillage     PHARYNGEAL PHASE:     ONSET TIME       Delayed initiation of the pharyngeal swallow was noted with swallow reflex triggered at the level of the pyriform sinus        PHARYNGEAL RESIDUALS        Vallecula/Pharyngeal Wall           Reduced tongue base retraction resulting in residuals in vallecula and/or posterior pharyngeal wall for all consistencies administered; attempt to clear was not present. Due to PT not following directions, cueing for clearance of pharyngeal cavity did not occur.       Pyriform Sinuses      Residuals in the pyriform sinuses were noted due to pharyngeal weakness for all consistencies administered. attempt to clear was not present. Due to PT not following directions, cueing for clearance of pharyngeal cavity did not occur.      LARYNGEAL PENETRATION   Laryngeal penetration occurred prior to aspiration.  Further details under aspiration section.    Laryngeal penetration AFTER the swallow for honey thick liquids secondary to residue in the pyriform space. No attempts were made to clear.  counseling/discussion    Palliative care encounter    Seizures (HCC)    Altered mental status       Verito Anna BS  Student Speech Language Pathologist    INTERVENTION  CPT Code: 75477  dysphagia tx    Speech Pathologist (SLP) completed education with the patient/family regarding type of swallowing impairment. Reviewed current solid/liquid consistency diet recommendations and need to participate in this study in order to initiate oral diet. Extensive time spent with pt educating and re-educating on appropriate position for PO intake with poor learning and poor carryover. Reviewed aspiration precautions. Encouraged patient and/or family to engage SLP in unstructured Q&A session relative to identified deficit areas; indicated understanding of all information provided via satisfactory verbal response.

## 2025-06-11 NOTE — PROGRESS NOTES
Patient having increased SOB, sating 96% on 2L. Attending notified and new orders placed.    Viviana Connell RN

## 2025-06-11 NOTE — CARE COORDINATION
6/11/2025 Update CM note:  Chart reviewed and case discussed in IDR.  Pt remains on iv heparin gtt. INR 1.5 today. Per physician would like inr 2.0 to 2.5.  Pt has been denied for [facility] d/t not having ltac criteria.  Pt still has bridled NG w/tube feeing.  MBSS has been ordered.  Pt has been denied for [facility] d/t not having ltac criteria.  Discharge plan is [facility] for skilled rehab. Will require precert.  PASRR completed.  Destination/priscilla completed.  Ambulance form initiated and will need completed prior to discharge.  Envelope in soft chart.  CM will follow for transition of care needs. Electronically signed by Korina Pritchett RN on 6/11/2025 at 12:24 PM

## 2025-06-11 NOTE — PLAN OF CARE
Problem: Chronic Conditions and Co-morbidities  Goal: Patient's chronic conditions and co-morbidity symptoms are monitored and maintained or improved  6/11/2025 1645 by Jeni Tolbert RN  Outcome: Progressing  6/11/2025 1046 by Viviana Connell RN  Outcome: Progressing     Problem: Discharge Planning  Goal: Discharge to home or other facility with appropriate resources  6/11/2025 1645 by Jeni Tolbert RN  Outcome: Progressing  6/11/2025 1046 by Viviana Connell RN  Outcome: Progressing     Problem: Safety - Adult  Goal: Free from fall injury  6/11/2025 1645 by Jeni Tolbert RN  Outcome: Progressing  6/11/2025 1046 by Viviana Connell RN  Outcome: Progressing     Problem: Pain  Goal: Verbalizes/displays adequate comfort level or baseline comfort level  6/11/2025 1645 by Jeni Tolbert RN  Outcome: Progressing  6/11/2025 1046 by Viviana Connell RN  Outcome: Progressing     Problem: Skin/Tissue Integrity  Goal: Skin integrity remains intact  Description: 1.  Monitor for areas of redness and/or skin breakdown2.  Assess vascular access sites hourly3.  Every 4-6 hours minimum:  Change oxygen saturation probe site4.  Every 4-6 hours:  If on nasal continuous positive airway pressure, respiratory therapy assess nares and determine need for appliance change or resting period  6/11/2025 1645 by Jeni Tolbert RN  Outcome: Progressing  Flowsheets (Taken 6/11/2025 1530)  Skin Integrity Remains Intact: Monitor for areas of redness and/or skin breakdown  6/11/2025 1046 by Viviana Connell RN  Outcome: Progressing     Problem: Nutrition Deficit:  Goal: Optimize nutritional status  6/11/2025 1645 by Jeni Tolbert RN  Outcome: Progressing  6/11/2025 1046 by Viviana Connell RN  Outcome: Progressing     Problem: Neurosensory - Adult  Goal: Achieves stable or improved neurological status  6/11/2025 1645 by Jeni Tolbert RN  Outcome: Progressing  6/11/2025 1046 by Viviana Connell RN  Outcome: Progressing  Goal:  1046 by Viviana Connell RN  Outcome: Progressing     Problem: Musculoskeletal - Adult  Goal: Return mobility to safest level of function  6/11/2025 1645 by Jeni Tolbert RN  Outcome: Progressing  6/11/2025 1046 by Viviana Connell RN  Outcome: Progressing  Goal: Maintain proper alignment of affected body part  6/11/2025 1645 by Jeni Tolbert RN  Outcome: Progressing  6/11/2025 1046 by Viviana Connell RN  Outcome: Progressing     Problem: Seizure Precautions  Goal: Remains free of injury related to seizures activity  6/11/2025 1645 by Jeni Tolbert RN  Outcome: Progressing  6/11/2025 1046 by Viviana Connell RN  Outcome: Progressing  Flowsheets (Taken 6/11/2025 0800)  Remains free of injury related to seizure activity: Maintain airway, patient safety  and administer oxygen as ordered

## 2025-06-11 NOTE — PROGRESS NOTES
[facility] Quality Flow/Interdisciplinary Rounds Progress Note        Quality Flow Rounds held on June 11, 2025    Disciplines Attending:  Bedside Nurse, , , and Nursing Unit Leadership    Len Dorsey was admitted on 5/19/2025 10:40 AM    Anticipated Discharge Date:       Disposition:    Jasvir Score:  Jasvir Scale Score: 14    [facility] RISK OF UNPLANNED READMISSION 2.0             25 Total Score        Discussed patient goal for the day, patient clinical progression, and barriers to discharge.  The following Goal(s) of the Day/Commitment(s) have been identified:  Diagnostics - Report Results and Labs - Report Results      Marita Dorsey RN  June 11, 2025

## 2025-06-11 NOTE — PROGRESS NOTES
Patient's Leonie gonsalves, at bedside and updated on new room number. Patient's glasses sent with major.    Viviana Connell RN

## 2025-06-11 NOTE — PROGRESS NOTES
Spoke with Dr. Galindo on unit regarding downgrading pt.     Ok to downgrade to medsurg, no tele needed    Marita Dorsey RN]

## 2025-06-11 NOTE — PROGRESS NOTES
[facility] Hospitalist Progress Note    SYNOPSIS: Patient admitted on 5/19/2025 for Supratherapeutic INR    s: Patient presented to the emergency department with concerns about not being able to take care of himself. Patient apparently been canceled doctors appointments and not taking medications. . INR is greater than 10. CT imaging shows some increased third spacing of fluid. Chest x-ray shows bibasilar opacities. Patient was started on ceftriaxone and doxycycline for presumed pneumonia. He was also noted to have seizure-like activity for which she was given Ativan. He was also placed on CIWA protocol. Patient be admitted to the ICU.  ID consulted and s/p micafungin for beta D glucan antigenemia and Candida colonization       5/27 restarted on levophed, bumex held   5/28 - on Levophed, midodrine increased   5/30 - continues on Levophed, intermittently following commands, midodrine increased   5/31 - Bleeding through NG tube, INR 4.1, given ffp and vitamin K. GI consulted   6/1 still with bleeding per NG but improved. Remains on precedex   6/2 on precedex but awake, following commands, on SBT  6/3 extubated to nasal cannula  6/4 transferred out of the ICU  6/5 to have IR guided PICC line placed, Speech to work with him again today.      6/6-dc plan is [facility] precert pending; pt on eval states he did not have good sleep and is feeling horrible; melatonin added        6/7-stop micafungin per ID; given pt inr is 1.6 (subtherapeutic -goal of 2.5-3.5); heparin drip added for bridging     6/8-pt had rrt overnight for altered mentation  Pt is AAOx3 in am on my evaluation; ammonia wnl  FMS removed yesterday evening   Had BM in am  On iv diuresis; saturating 99% on 2 lo2 by nc      6/9-pt received ativan 1 mg in iv overnight for agitation  Pt minimally responsive in am; neuro consulted  No improvement in mentation in noon  Abg reviewed-normal  Ammonia normal  Procal is downtrending; id signed off post  02/18/2025    TSH 2.98 05/20/2025    INR 1.5 06/11/2025    LABA1C 4.9 05/08/2024       Radiology: REVIEWED DAILY    +++++++++++++++++++++++++++++++++++++++++++++++++  Laila Galindo MD  [facility]  [place], [place]  +++++++++++++++++++++++++++++++++++++++++++++++++  NOTE: This report was transcribed using voice recognition software. Every effort was made to ensure accuracy; however, inadvertent computerized transcription errors may be present.

## 2025-06-11 NOTE — PROGRESS NOTES
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     Weight:   Wt Readings from Last 1 Encounters:   06/07/25 115.2 kg (254 lb)       TSH:    Lab Results   Component Value Date/Time    TSH 2.98 05/20/2025 04:31 AM       Hepatic Function Panel:                            Lab Results   Component Value Date/Time    ALKPHOS 343 06/10/2025 07:27 AM    ALT 28 06/10/2025 07:27 AM    AST 36 06/10/2025 07:27 AM    BILITOT 1.6 06/10/2025 07:27 AM    BILIDIR 1.0 06/10/2025 07:27 AM    IBILI 0.6 06/10/2025 07:27 AM         Recent Labs     06/09/25  0821 06/11/25  0432   HGB 8.0* 8.0*     Date Warfarin Dose INR Comment   5/20 1 mg 3.2 VitK 10 mg x1 on 5/19  Meropenem started   5/21 1 mg- medication held  2 Warfarin held for IR procedure   5/22 1.5 mg 2    5/23 1mg  2.4    5/24 0.5 mg 2.9    5/25 0.5 mg 2.9    5/26 1 mg 2.5    5/27 1.5 mg 2.1    5/28 1.5 mg 2.3    5/29 1.5 mg 2.5    5/30 0.5 mg 3.2    - - - -   6/4 1 mg 1.5 Re-consulted   6/5 1 mg 1.7    6/6 1 mg 1.8    6/7 1.5 mg 1.6 Heparin gtt   6/8 1.5 mg 1.5 Heparin gtt   6/10 2.5 mg 1.5 Heparin gtt   6/11 < 2.5 mg > 1.5 Heparin gtt     Assessment:  Patient is a 59 y.o. male on warfarin for Atrial Fibrillation, AVR, and mMVR.  Patient's home warfarin dosing regimen is 1 mg daily - there is some questions surrounding compliance.   Goal INR 2.5 - 3.5    Plan:  Warfarin 2.5 mg x1  Daily PT/INR until the INR is stable within the therapeutic range  Pharmacist will follow and monitor/adjust dosing as necessary    Thank you for this consult,    Kip Burris, PharmD  PGY-1 Pharmacy Practice Resident, [phone]  6/11/2025 11:03 AM

## 2025-06-11 NOTE — PROGRESS NOTES
4 Eyes Skin Assessment     NAME:  Len Dorsey  YOB: 1965  MEDICAL RECORD NUMBER:  [id number]    The patient is being assessed for  Transfer to New Unit    I agree that at least one RN has performed a thorough Head to Toe Skin Assessment on the patient. ALL assessment sites listed below have been assessed.      Areas assessed by both nurses:    Head, Face, Ears, Shoulders, Back, Chest, Arms, Elbows, Hands, Sacrum. Buttock, Coccyx, Ischium, and Legs. Feet and Heels        Does the Patient have a Wound? Yes wound(s) were present on assessment. LDA wound assessment was Initiated and completed by RN       Jasvir Prevention initiated by RN: Yes  Wound Care Orders initiated by RN: Yes    Pressure Injury (Stage 3,4, Unstageable, DTI, NWPT, and Complex wounds) if present, place Wound referral order by RN under : Yes    New Ostomies, if present place, Ostomy referral order under : No     Nurse 1 eSignature: Electronically signed by Jeni Tolbert RN on 6/11/25 at 4:19 PM EDT    **SHARE this note so that the co-signing nurse can place an eSignature**    Nurse 2 eSignature: Electronically signed by ALICIA CALIXTO RN on 6/11/25 at 4:20 PM EDT

## 2025-06-12 NOTE — PROGRESS NOTES
[facility] Hospitalist Progress Note    SYNOPSIS: Patient admitted on 5/19/2025 for Supratherapeutic INR    s: Patient presented to the emergency department with concerns about not being able to take care of himself. Patient apparently been canceled doctors appointments and not taking medications. . INR is greater than 10. CT imaging shows some increased third spacing of fluid. Chest x-ray shows bibasilar opacities. Patient was started on ceftriaxone and doxycycline for presumed pneumonia. He was also noted to have seizure-like activity for which she was given Ativan. He was also placed on CIWA protocol. Patient be admitted to the ICU.  ID consulted and s/p micafungin for beta D glucan antigenemia and Candida colonization       5/27 restarted on levophed, bumex held   5/28 - on Levophed, midodrine increased   5/30 - continues on Levophed, intermittently following commands, midodrine increased   5/31 - Bleeding through NG tube, INR 4.1, given ffp and vitamin K. GI consulted   6/1 still with bleeding per NG but improved. Remains on precedex   6/2 on precedex but awake, following commands, on SBT  6/3 extubated to nasal cannula  6/4 transferred out of the ICU  6/5 to have IR guided PICC line placed, Speech to work with him again today.      6/6-dc plan is select precert pending; pt on eval states he did not have good sleep and is feeling horrible; melatonin added        6/7-stop micafungin per ID; given pt inr is 1.6 (subtherapeutic -goal of 2.5-3.5); heparin drip added for bridging     6/8-pt had rrt overnight for altered mentation  Pt is AAOx3 in am on my evaluation; ammonia wnl  FMS removed yesterday evening   Had BM in am  On iv diuresis; saturating 99% on 2 lo2 by nc      6/9-pt received ativan 1 mg in iv overnight for agitation  Pt minimally responsive in am; neuro consulted  No improvement in mentation in noon  Abg reviewed-normal  Ammonia normal  Procal is downtrending; id signed off post  no rhonchi; no rales  Cardiovascular: rhythm regular; rate controlled; no murmurs  Abdomen: Soft, nontender, nondistended  Extremities:  peripheral pulses present; no peripheral edema; no ulcers  Musculoskeletal: No clubbing, cyanosis, no bilateral lower extremity edema. Brisk capillary refill.   Skin:  No rashes  on visible skin  Neurologic: drowsy not following commands     ASSESSMENT and PLAN:    Altered mental status  New onset seizure  Pt is drowsy today  Ct head-no acute findings  Ammonia wnl  Procal downtrending compared to previous; ID signed off  Tsh wnl  Neuro monitoring  Neurology on board  Due to ppm unable to have mri   . EEG completed 5/23- moderate nonspecific encephalopathy, No seizures or epileptiform discharges were noted   Continue vimpat  Aspiration precautions      Subtherapeutic INR  Inr is 1.5  Initially inr was supratherapeutic; s/p vitamin k  Warfarin Dosing per pharmacy  Continue bridging with heparin  Needs to be on heparin given h/o heart valve replacement    Alcoholic cirhosis  Delirium tremens  Dysphagia on NG tube  S/p neurology evaluation  On lactulose and rifaximin -goal bm of 2-3 per day  S/p FMS removal on 6/7  GI signed off; no plans of egd  Monitor Hn  right upper quadrant ultrasound with cholelithiasis, no signs of cholecystitis, steatosis.   Monitor LFT  Continue tube feeds as tolerated  Monitor blood sguar        Septic shock unclear etiology  Beta D glucan antigenemia  S/p  aztreonam and micafungin  ID signed off      Ckd  Monitor creatinine          DVT Prophylaxis [] Lovenox, []  Heparin, [] SCDs, [] Ambulation   GI Prophylaxis [] PPI,  [] H2 Blocker,  [] Carafate,  [] Diet/Tube Feeds   Disposition Patient requires continued admission due to patient still has altered mental status-neuro on board   MDM [] Low, [] Moderate,[]  High       Medications:  REVIEWED DAILY    Infusion Medications    sodium chloride Stopped (05/22/25 2105)    dextrose       Scheduled Medications       Component Value Date    CHOL 112 02/18/2025    TRIG 138 02/18/2025    HDL 57 02/18/2025    TSH 2.98 05/20/2025    INR 1.4 06/12/2025    LABA1C 4.9 05/08/2024       Radiology: REVIEWED DAILY    +++++++++++++++++++++++++++++++++++++++++++++++++  Laila Galindo MD  [facility]- [place], [place]  +++++++++++++++++++++++++++++++++++++++++++++++++  NOTE: This report was transcribed using voice recognition software. Every effort was made to ensure accuracy; however, inadvertent computerized transcription errors may be present.

## 2025-06-12 NOTE — PROGRESS NOTES
Pt respiratory status change.  Breathing is shallow and tachypneic at 28 bpm.SpO2 91% on 2L NC. Thick dark secretions swabbed from patient's mouth.Dr. Galindo notified.   Kylie Domingo RN

## 2025-06-12 NOTE — CONSULTS
loss.      No change in sleep pattern or activity.      No fevers, chills or rigors.    Eyes:    No visual changes or diplopia.      No scleral icterus.  ENT:    No Headaches, hearing loss or vertigo.      No mouth sores or sore throat.   Cardiovascular:  + chest discomfort, palpitations.  Respiratory:  + cough, No wheezing      No sputum production.      No hemoptysis, pleuritic pain.  Gastrointestinal:  No abdominal pain, appetite loss, blood in stools.      No hematemesis  Genitourinary:  No dysuria, trouble voiding or hematuria.      No nocturia.  Musculoskeletal:   No weakness or joint complaints.  Integumentary: No rashes or pruritis.  Neurological:  No headache, numbness or tingling.     Psychiatric:   No effect on mood, memory, mentation, or behavior.     No anxiety or depression.  Endocrine:   No excessive thirst, fluid intake, or urination.      No tremor.  Hematologic: No abnormal bruising or bleeding.  Lymphatic:  No swollen lymph nodes.  Immunologic:  No hives or hx of anaphylaxis      OBJECTIVE:     PHYSICAL EXAM:   VITALS:   Vitals:    06/12/25 1515 06/12/25 1530 06/12/25 1545 06/12/25 1600   BP: 121/67 124/67 118/70 111/66   Pulse: 76 83 77 99   Resp: 24 20 20 20   Temp:       TempSrc:       SpO2: 98% 98% 99% 98%   Weight:       Height:            Intake/Output Summary (Last 24 hours) at 6/12/2025 1626  Last data filed at 6/12/2025 0255  Gross per 24 hour   Intake 1588.1 ml   Output 1400 ml   Net 188.1 ml        CONSTITUTIONAL:   Alert  SKIN:     Skin discoloration  HEENT:     EOMI, MMM, No thrush  NECK:    + JVP appreciated  CV:      Sinus,  No murmur, No rubs, No gallops  PULMONARY:   Couse BS,  No Wheezing, No Rales, No Rhonchi      + egophony  ABDOMEN:     Soft, non-tender. BS normal. No R/R/G  EXT:    No deformities .  No clubbing.       + lower extremity edema, No venous stasis  PULSE:   Palpable.  PSYCHIATRIC:  Seems appropriate, No acute psychosis  MS:    Gross weakness  NEUROLOGIC:   The  reduce the radiation dose to as low as reasonably achievable. COMPARISON: None. HISTORY: ORDERING SYSTEM PROVIDED HISTORY: increased lethargy TECHNOLOGIST PROVIDED HISTORY: Reason for exam:->increased lethargy Has a \"code stroke\" or \"stroke alert\" been called?->No What reading provider will be dictating this exam?->CRC FINDINGS: BRAIN/VENTRICLES: There is no acute intracranial hemorrhage, mass effect or midline shift.  No abnormal extra-axial fluid collection.  The gray-white differentiation is maintained without changes of encephalomalacia right frontal lobe.  Of an acute infarct.  There is no evidence of hydrocephalus. ORBITS: The visualized portion of the orbits demonstrate no acute abnormality. SINUSES: The visualized paranasal sinuses and mastoid air cells demonstrate no acute abnormality. SOFT TISSUES/SKULL:  No acute abnormality of the visualized skull or soft tissues.     No acute intracranial abnormality.     XR CHEST PORTABLE  Result Date: 6/12/2025  EXAMINATION: ONE XRAY VIEW OF THE CHEST 6/12/2025 11:11 am COMPARISON: June 11, 2025 HISTORY: ORDERING SYSTEM PROVIDED HISTORY: eval and compare TECHNOLOGIST PROVIDED HISTORY: Reason for exam:->eval and compare FINDINGS: Right-sided pacemaker is present.  NG tube is present with distal tip not visualized on this examination.  There are median sternotomy wires.  There are low lung volumes limiting this examination.  Interstitial and hazy opacities are present throughout both lungs.  No pneumothorax.  There is a prominent cardiomediastinal silhouette     1. Limited due to low lung volumes. 2. Stable interstitial and hazy opacities are present throughout both lungs which could indicate pulmonary vascular congestion with areas of subsegmental atelectasis.  No new opacity or obvious pleural effusion.     XR CHEST PORTABLE  Result Date: 6/11/2025  EXAMINATION: ONE XRAY VIEW OF THE CHEST 6/11/2025 2:01 pm COMPARISON: None. HISTORY: ORDERING SYSTEM PROVIDED HISTORY:  and recommendations.     CT HEAD WO CONTRAST  Result Date: 6/9/2025  EXAM: CT HEAD WITHOUT CONTRAST 06/09/2025 12:39:11 PM TECHNIQUE: CT of the head was performed without the administration of intravenous contrast. Automated exposure control, iterative reconstruction, and/or weight based adjustment of the mA/kV was utilized to reduce the radiation dose to as low as reasonably achievable. COMPARISON: There is a chronic infarction in the right frontal lobe. Mild-to-moderate cerebral volume loss. Mild chronic microvascular ischemic changes. Small infarctions in the bilateral cerebellar hemispheres. CLINICAL HISTORY: Altered mentation. FINDINGS: BRAIN AND VENTRICLES: There is no acute intracranial hemorrhage, mass effect or midline shift. No abnormal extra-axial fluid collection. The gray-white differentiation is maintained without an acute infarct. There is no hydrocephalus. There is a chronic infarction in the right frontal lobe. Small infarctions in the bilateral cerebellar hemispheres. Mild-to-moderate cerebral volume loss. Mild chronic microvascular ischemic changes. ORBITS: The visualized portion of the orbits demonstrate no acute abnormality. SINUSES: The visualized paranasal sinuses and mastoid air cells demonstrate no acute abnormality. SOFT TISSUES AND SKULL: No acute abnormality of the visualized skull or soft tissues.     1. No acute intracranial abnormality. 2. Chronic infarction in the right frontal lobe and small infarctions in the bilateral cerebellar hemispheres. 3. Mild-to-moderate cerebral volume loss and mild chronic microvascular ischemic changes.     Vascular duplex upper extremity venous left  Result Date: 6/9/2025  EXAMINATION: VENOUS ULTRASOUND OF THE LEFT UPPER EXTREMITY, 6/9/2025 9:51 am TECHNIQUE: Duplex ultrasound using B-mode/gray scaled imaging and Doppler spectral analysis and color flow was obtained of the deep venous structures of the left upper extremity. COMPARISON: U/S left upper

## 2025-06-12 NOTE — CARE COORDINATION
Patient was a transfer for [facility], on 2L NC, IV Bumex (BID), IV Vimpat (BID), NG tube, for chest XR and CT of the head, sitter at bedside. Call made to Erin at [facility] to check if they are able to accept patient with a NG tube or will patient need a 30-day plan. She states they have no issue with the NG, advised the patient has a balance that he owes, informed CM on Monday that it needs paid before they will accept. Called patient's significant other/HCPOALeonie to discuss discharge plan. Leonie confirmed that the plan was [facility], she was unaware patient was denied, states no one has called her in over a week. Explained referral to [facility], patient has a balance due that must be paid. She states she is unable to afford $300-$400 per day and is willing to apply for medicaid. She states the patient has children whom he is estranged from and they want nothing to do with him. She is agreeable to referrals being made to any facility whom may accept. [facility] PASRR completed, however no accepting facility and may need updated if d/c plan is not [facility].    Electronically signed by DELBERT Pang on 6/12/2025 at 3:16 PM

## 2025-06-12 NOTE — PROGRESS NOTES
New patient consult to Pulmonology for acute hypoxic resp. Failure.  Dr. Almodovar contacted via phone call from [employer].    1600 pt known to Dr. Chandra, consult changed, Dr. Cole arboleda, notified via [facility]  Kylie Domingo RN

## 2025-06-12 NOTE — PROGRESS NOTES
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     Weight:   Wt Readings from Last 1 Encounters:   06/07/25 115.2 kg (254 lb)     TSH:    Lab Results   Component Value Date/Time    TSH 2.98 05/20/2025 04:31 AM     Hepatic Function Panel:              Lab Results   Component Value Date/Time    ALKPHOS 343 06/10/2025 07:27 AM    ALT 28 06/10/2025 07:27 AM    AST 36 06/10/2025 07:27 AM    BILITOT 1.6 06/10/2025 07:27 AM    BILIDIR 1.0 06/10/2025 07:27 AM    IBILI 0.6 06/10/2025 07:27 AM       Recent Labs     06/09/25  0821 06/11/25  0432   HGB 8.0* 8.0*     Date Warfarin Dose INR Comment   5/19 -- >10 VitK 10 mg IV x1    5/20 1 mg 3.2 Meropenem started   5/21 1 mg- medication held  2 Warfarin held for IR procedure   5/22 1.5 mg 2    5/23 1mg  2.4    5/24 0.5 mg 2.9    5/25 0.5 mg 2.9    5/26 1 mg 2.5    5/27 1.5 mg 2.1    5/28 1.5 mg 2.3    5/29 1.5 mg 2.5    5/30 0.5 mg 3.2    5/31 Hold 4.9 Vit K 10 mg IV x1   (concern for GI bleed)   6/1-5/3 -- --    6/4 1 mg 1.5 Re-consulted   6/5 1 mg 1.7    6/6 1 mg 1.8    6/7 1.5 mg 1.6 Heparin drip   6/8 1.5 mg 1.5 Heparin drip   6/9 --- 1.4 Heparin drip   6/10 2.5 mg 1.5 Heparin drip   6/11 2.5 mg 1.5 Heparin drip   6/12 <2.5 mg> 1.4 Enoxaparin 1mg/kg BID     Assessment:  Patient is a 59 y.o. male on warfarin for Atrial Fibrillation, AVR, and mMVR.  Patient's home warfarin dosing regimen is 1 mg daily - there is some questions surrounding compliance.   Goal INR 2.5 - 3.5    Plan:  Warfarin 2.5 mg x1.   Difficult to predict discharge dose of warfarin. Warfarin 2.5 mg daily is reasonable at this point, as INR subtherapeutic past few days on lower doses. In the recent past, patient has required only about 1 mg daily. In the more distant past, patient has required about 3 or 4 mg daily.   Daily PT/INR  until the INR is stable within the therapeutic range  Pharmacist will follow and monitor/adjust dosing as necessary    Thank you for this consult,    Shira Sapp, PharmD, BCPS 6/12/2025 7:45 AM  Ext [phone]

## 2025-06-12 NOTE — PLAN OF CARE
Problem: Chronic Conditions and Co-morbidities  Goal: Patient's chronic conditions and co-morbidity symptoms are monitored and maintained or improved  6/11/2025 2219 by Haily Echavarria RN  Outcome: Progressing  6/11/2025 1645 by Jeni Tolbert RN  Outcome: Progressing  6/11/2025 1046 by Viviana Connell RN  Outcome: Progressing     Problem: Discharge Planning  Goal: Discharge to home or other facility with appropriate resources  6/11/2025 2219 by Haily Echavarria RN  Outcome: Progressing  6/11/2025 1645 by Jeni Tolbert RN  Outcome: Progressing  6/11/2025 1046 by Viviana Connell RN  Outcome: Progressing     Problem: Safety - Adult  Goal: Free from fall injury  6/11/2025 2219 by Haily Echavarria RN  Outcome: Progressing  6/11/2025 1645 by Jeni Tolbert RN  Outcome: Progressing  6/11/2025 1046 by Viviana Connell RN  Outcome: Progressing     Problem: Pain  Goal: Verbalizes/displays adequate comfort level or baseline comfort level  6/11/2025 2219 by Haily Echavarria RN  Outcome: Progressing  6/11/2025 1645 by Jeni Tolbert RN  Outcome: Progressing  6/11/2025 1046 by Viviana Connell RN  Outcome: Progressing     Problem: Skin/Tissue Integrity  Goal: Skin integrity remains intact  Description: 1.  Monitor for areas of redness and/or skin breakdown2.  Assess vascular access sites hourly3.  Every 4-6 hours minimum:  Change oxygen saturation probe site4.  Every 4-6 hours:  If on nasal continuous positive airway pressure, respiratory therapy assess nares and determine need for appliance change or resting period  6/11/2025 2219 by Haily Echavarria RN  Outcome: Progressing  6/11/2025 1645 by Jeni Tolbert RN  Outcome: Progressing  Flowsheets (Taken 6/11/2025 1530)  Skin Integrity Remains Intact: Monitor for areas of redness and/or skin breakdown  6/11/2025 1046 by Viviana Connell RN  Outcome: Progressing     Problem: Nutrition Deficit:  Goal: Optimize nutritional status  6/11/2025 2219 by  and Spiritual Care consult, as indicated  INTERVENTIONS:1. Assess for possible contributors to thought disturbance, including medications, impaired vision or hearing, underlying metabolic abnormalities, dehydration, psychiatric diagnoses, and notify attending LIP2. [place] high risk fall precautions, as indicated3. Provide frequent short contacts to provide reality reorientation, refocusing and direction4. Decrease environmental stimuli, including noise as appropriate5. Monitor and intervene to maintain adequate nutrition, hydration, elimination, sleep and activity6. If unable to ensure safety without constant attention obtain sitter and review sitter guidelines with assigned personnel7. Initiate Psychosocial CNS and Spiritual Care consult, as indicated  6/11/2025 2219 by Haily Echavarria RN  Outcome: Progressing  6/11/2025 1645 by Jeni Tolbert RN  Outcome: Progressing  6/11/2025 1046 by Viviana Connell RN  Outcome: Progressing     Problem: ABCDS Injury Assessment  Goal: Absence of physical injury  6/11/2025 2219 by Haily Echavarria RN  Outcome: Progressing  6/11/2025 1645 by Jeni Tolbert RN  Outcome: Progressing  6/11/2025 1046 by Viviana Connell RN  Outcome: Progressing     Problem: Musculoskeletal - Adult  Goal: Return mobility to safest level of function  6/11/2025 2219 by Haily Echavarria RN  Outcome: Progressing  6/11/2025 1645 by Jeni Tolbert RN  Outcome: Progressing  6/11/2025 1046 by Viviana Connell RN  Outcome: Progressing  Goal: Maintain proper alignment of affected body part  6/11/2025 2219 by Haily Echavarria RN  Outcome: Progressing  6/11/2025 1645 by Jeni Tolbert RN  Outcome: Progressing  6/11/2025 1046 by Viviana Connell RN  Outcome: Progressing     Problem: Seizure Precautions  Goal: Remains free of injury related to seizures activity  6/11/2025 2219 by Haily Echavarria RN  Outcome: Progressing  6/11/2025 1645 by Jeni Tolbert RN  Outcome:  Progressing  6/11/2025 1046 by Viviana Connell, RN  Outcome: Progressing  Flowsheets (Taken 6/11/2025 0800)  Remains free of injury related to seizure activity: Maintain airway, patient safety  and administer oxygen as ordered

## 2025-06-12 NOTE — PROGRESS NOTES
Len Dorsey is a 59 y.o.  male     Neurology following for altered mental status    PMH of alcoholic liver disease, aortic valve replacement, mitral valve replacement, heart failure, previous CVA, testicular cancer s/p chemotherapy, coronary artery disease s/p CABG    Assessment:     Altered mental status  Patient with worsening confusion after Keppra was restarted  Keppra has been changed to Vimpat  Upon exam patient is lethargic    Postinfarct seizures  Patient developed seizures  Was maintained on Keppra however making him altered, this has been switched to Vimpat    Plan:     Stat CT head  Continue Vimpat 50 mg twice daily  Neurology will follow    History of Present Illness:     Patient initially presented to ED on 5/19 for failure to thrive.  He lives at home with his fiancée where he has a history of significant alcohol use although he has not had a drink in 1 month.  Patient reported he had not been taking his meds because he ran out of them and has not been going to his doctors appointments.  Initially neurology saw him 05/20 for AMS and possible seizure.  He has a diagnosis of his poststroke epilepsy and was having alcohol withdrawal which may have lowered his seizure threshold.  This would unlikely cause focal seizures typically.  His EEG on 5/23 showed moderate nonspecific encephalopathy without seizure with subsequent neuro signed off on 5/22 with plans for EEG as outpatient.     We were consulted again on 5/29 for reevaluation for continued AMS.  Renal dose Keppra was continued at that time which was 250 mg twice daily.  High-dose thiamine was also continued and neurology signed off.    Neurology was reconsulted on 6/9/2025 for worsening mentation and code stroke initiation.  Patient was obtunded and altered during the code stroke.  Still unable to have MRI brain and stat CT head was completed which was negative for acute abnormalities    Wife admitted that Keppra causes him altered mental  status.  She said this medication was stopped before within 3 days he was back to his baseline    Keppra was discontinued, he was given Romazicon and Keppra was switched to Vimpat.    Subjective:     Patient more lethargic today than he was yesterday.  He is only able to tell me his name.  He is short of breath and is tachypneic.  I do feel this is not neurological, possibly more metabolic.  Did order stat CT of his head      Reached out to primary and updated her on patient's mental status and respiratory status      There is no family at the bedside    Objective:       /78   Pulse 80   Temp 97.6 °F (36.4 °C) (Oral)   Resp 22   Ht 1.778 m (5' 10\")   Wt 115.2 kg (254 lb)   SpO2 98%   BMI 36.45 kg/m²       General appearance: Lethargic appears in distress, tachypneic and short of breath  Head: normocephalic, without obvious abnormality, atraumatic  Eyes: conjunctivae/corneas clear  Neck: no adenopathy,  supple, symmetrical, trachea midline and thyroid not enlarged, symmetric, no tenderness/mass/nodules  Lungs: Regular respirations on nasal cannula, tachypnea and shortness of breath  Heart: No chest pain or palpitations  Abdomen: soft, non-tender; bowel sounds normal; no masses,  no organomegaly, NG present  Extremities:  normal, atraumatic, no cyanosis or edema  Pulses: 2+ and symmetric  Skin: color, texture, turgor normal---no rashes or lesions      Mental Status: Lethargic, appears in distress    Impaired attention/concentration  Impaired fundus of knowledge  Memories impaired    Speech: no dysarthria  Language: no aphasias      Cranial Nerves:  I: smell    II: visual acuity     II: visual fields Bilateral threat   II: pupils CEFERINO   III,VII: ptosis None   III,IV,VI: extraocular muscles  Squeezes eyes shut   V: mastication Normal   V: facial light touch sensation  Normal   V,VII: corneal reflex     VII: facial muscle function - upper  Normal   VII: facial muscle function - lower Normal   VIII: hearing    Lab Results   Component Value Date/Time    LABA1C 4.9 05/08/2024 09:47 AM     Lab Results   Component Value Date    CHOL 112 02/18/2025    TRIG 138 02/18/2025    HDL 57 02/18/2025    LDL 27 02/18/2025    VLDL 28 02/18/2025     CT head  1. No acute intracranial abnormality.  2. Chronic infarction in the right frontal lobe and small infarctions in the bilateral cerebellar hemispheres.  3. Mild-to-moderate cerebral volume loss and mild chronic microvascular ischemic changes.    EEG 5/23/2025  This abnormal study showed evidence of:  A moderate nonspecific encephalopathy     No seizures or epileptiform discharges were noted during this study.       Stat CT head  Negative for acute abnormality    All labs and imaging studies reviewed independently today             ARNALDO Vincent CNP  12:10 PM  6/12/2025

## 2025-06-13 NOTE — PROGRESS NOTES
Speech Language Pathology  NAME:  Len Dorsey  :  1965  DATE: 2025  ROOM:  [facility]    Pt unavailable at 1241 for Dysphagia therapy     REASON:  Other: Patient transitioning to hospice care per charge nurse. SLP will sign off at this time.           Thank You    Stephanie Grissom M.S., CCC-SLP  Speech-Language Pathologist  SP. [phone]

## 2025-06-13 NOTE — PROGRESS NOTES
06/10/2025 07:27 AM     PT/INR:    Lab Results   Component Value Date/Time    PROTIME 18.1 06/13/2025 04:30 AM    PROTIME 96.0 09/15/2022 01:07 PM    INR 1.7 06/13/2025 04:30 AM        PRO-BNP:   Lab Results   Component Value Date    PROBNP 13,928 (H) 06/12/2025    PROBNP 12,541 (H) 06/07/2025      ABGs:   Lab Results   Component Value Date/Time    PH 7.326 06/12/2025 05:14 PM    PO2 114.8 06/12/2025 05:14 PM    PCO2 60.6 06/12/2025 05:14 PM     Hemoglobin A1C: No components found for: \"HGBA1C\"    IMAGING:  Imaging tests were completed and reviewed and discussed radiology and care team involved and reveals   CT HEAD WO CONTRAST  Result Date: 6/12/2025  EXAMINATION: CT OF THE HEAD WITHOUT CONTRAST  6/12/2025 11:50 am TECHNIQUE: CT of the head was performed without the administration of intravenous contrast. Automated exposure control, iterative reconstruction, and/or weight based adjustment of the mA/kV was utilized to reduce the radiation dose to as low as reasonably achievable. COMPARISON: None. HISTORY: ORDERING SYSTEM PROVIDED HISTORY: increased lethargy TECHNOLOGIST PROVIDED HISTORY: Reason for exam:->increased lethargy Has a \"code stroke\" or \"stroke alert\" been called?->No What reading provider will be dictating this exam?->CRC FINDINGS: BRAIN/VENTRICLES: There is no acute intracranial hemorrhage, mass effect or midline shift.  No abnormal extra-axial fluid collection.  The gray-white differentiation is maintained without changes of encephalomalacia right frontal lobe.  Of an acute infarct.  There is no evidence of hydrocephalus. ORBITS: The visualized portion of the orbits demonstrate no acute abnormality. SINUSES: The visualized paranasal sinuses and mastoid air cells demonstrate no acute abnormality. SOFT TISSUES/SKULL:  No acute abnormality of the visualized skull or soft tissues.     No acute intracranial abnormality.     XR CHEST PORTABLE  Result Date: 6/12/2025  EXAMINATION: ONE XRAY VIEW OF THE CHEST  pleural effusions.     XR CHEST PORTABLE  Result Date: 6/3/2025  EXAM: 1 VIEW XRAY OF THE CHEST 06/03/2025 08:13:31 AM COMPARISON: 06/02/2025 CLINICAL HISTORY: Intubation. FINDINGS: LUNGS AND PLEURA: Decreased lung inspiration. No focal pulmonary opacity. No pulmonary edema. No pleural effusion. No pneumothorax. HEART AND MEDIASTINUM: Enlarged cardiac silhouette. No acute abnormality of the mediastinal silhouette. BONES AND SOFT TISSUES: No acute osseous abnormality. Right-sided stimulator. LINES AND TUBES: ET tube 2.4 cm above the christina. Right IJ central venous catheter is unchanged. NG tube in the abdomen.     1. ET tube 2.4 cm above the christina, right IJ central venous catheter, and NG tube in the abdomen. 2. Enlarged cardiac silhouette. 3. Decreased lung inspiration.     XR CHEST PORTABLE  Result Date: 6/2/2025  EXAMINATION: ONE XRAY VIEW OF THE CHEST 6/2/2025 8:02 am COMPARISON: 06/01/2025 HISTORY: ORDERING SYSTEM PROVIDED HISTORY: intubation TECHNOLOGIST PROVIDED HISTORY: Reason for exam:->intubation FINDINGS: The tip of the central line projects over the superior vena cava-atrial junction.  The tip of the endotracheal tube is 15 mm above the christina.  The tip of the nasogastric tube projects over the body the stomach.  The interstitial prominence within the lungs concerning for pulmonary edema or fluid overload has improved.  The contour of the mediastinum heart size are stable.  Note is made of a prosthetic heart valves. The cardiac generator and leads are stable in position.     1. The position and alignment of the tubes and catheters are within normal range. 2. Improved congestive changes.     CT ABDOMEN PELVIS WO CONTRAST Additional Contrast? None  Result Date: 6/1/2025  EXAMINATION: CT OF THE ABDOMEN AND PELVIS WITHOUT CONTRAST 6/1/2025 4:56 pm TECHNIQUE: CT of the abdomen and pelvis was performed without the administration of intravenous contrast. Multiplanar reformatted images are provided for review.  5/31/2025  EXAMINATION: ONE XRAY VIEW OF THE CHEST 5/31/2025 8:00 am COMPARISON: 05/30/2025 HISTORY: ORDERING SYSTEM PROVIDED HISTORY: intubation TECHNOLOGIST PROVIDED HISTORY: Reason for exam:->intubation FINDINGS: Portable chest reveals patient to be status post median sternotomy.  Lines and tubes in satisfactory position.  The heart is enlarged.  Increased interstitial markings seen throughout the lung fields slightly improved in the interval.  Lung volumes remain low with small bilateral pleural effusions.  No new focal parenchymal opacification present.     Cardiomegaly with mild interstitial edema slightly improved in the interval. Small bilateral pleural effusions.  No new focal parenchymal opacification present.     XR CHEST PORTABLE  Result Date: 5/30/2025  EXAMINATION: ONE XRAY VIEW OF THE CHEST 5/30/2025 7:54 am COMPARISON: Chest x-ray dated 05/29/2025 HISTORY: ORDERING SYSTEM PROVIDED HISTORY: intubation TECHNOLOGIST PROVIDED HISTORY: Reason for exam:->intubation FINDINGS: Support hardware unchanged and in satisfactory position.  Cardiac size enlarged with increased perihilar lung markings of central congestion. Bilateral pulmonary opacifications have increased in the interim greatest increase in the right mid and upper lung and left upper lung could represent worsening airspace disease or increased edema pattern with at least small to moderate right pleural effusion     Bilateral pulmonary opacifications have increased in the interim greatest increase in the right mid and upper lung and left upper lung could represent worsening airspace disease or increased edema pattern with at least small to moderate right pleural effusion.     XR CHEST PORTABLE  Result Date: 5/29/2025  EXAMINATION: ONE XRAY VIEW OF THE CHEST 5/29/2025 7:56 am COMPARISON: 05/28/2025 HISTORY: ORDERING SYSTEM PROVIDED HISTORY: intubation TECHNOLOGIST PROVIDED HISTORY: Reason for exam:->intubation FINDINGS: Patient body habitus and depth

## 2025-06-13 NOTE — PROGRESS NOTES
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     Weight:   Wt Readings from Last 1 Encounters:   06/13/25 122.5 kg (270 lb)     TSH:    Lab Results   Component Value Date/Time    TSH 2.98 05/20/2025 04:31 AM     Hepatic Function Panel:              Lab Results   Component Value Date/Time    ALKPHOS 343 06/10/2025 07:27 AM    ALT 28 06/10/2025 07:27 AM    AST 36 06/10/2025 07:27 AM    BILITOT 1.6 06/10/2025 07:27 AM    BILIDIR 1.0 06/10/2025 07:27 AM    IBILI 0.6 06/10/2025 07:27 AM       Recent Labs     06/11/25  0432 06/13/25  0430   HGB 8.0* 7.4*   PLT  --  88*     Date Warfarin Dose INR Comment   5/19 -- >10 VitK 10 mg IV x1    5/20 1 mg 3.2 Meropenem started   5/21 1 mg- medication held  2 Warfarin held for IR procedure   5/22 1.5 mg 2    5/23 1mg  2.4    5/24 0.5 mg 2.9    5/25 0.5 mg 2.9    5/26 1 mg 2.5    5/27 1.5 mg 2.1    5/28 1.5 mg 2.3    5/29 1.5 mg 2.5    5/30 0.5 mg 3.2    5/31 Hold 4.9 Vit K 10 mg IV x1   (concern for GI bleed)   6/1-5/3 -- --    6/4 1 mg 1.5 Re-consulted   6/5 1 mg 1.7    6/6 1 mg 1.8    6/7 1.5 mg 1.6 Heparin drip   6/8 1.5 mg 1.5 Heparin drip   6/9 --- 1.4 Heparin drip   6/10 2.5 mg 1.5 Heparin drip   6/11 2.5 mg 1.5 Heparin drip   6/12 2.5 mg 1.4 Enoxaparin 1mg/kg BID   6/13 <2.5 mg> 1.7 Enoxaparin 1mg/kg BID     Assessment:  Patient is a 59 y.o. male on warfarin for Atrial Fibrillation, AVR, and mMVR. Patient's home warfarin dosing regimen is 1 mg daily - there is some questions surrounding compliance.   Goal INR 2.5 - 3.5    Plan:  Warfarin 2.5 mg x1.   Difficult to predict discharge dose of warfarin. Warfarin 2.5 mg daily is reasonable at this point, as INR subtherapeutic past few days on lower doses. In the recent past, patient has required only about 1 mg daily. In the more distant past, patient  has required about 3 or 4 mg daily.   Daily PT/INR until the INR is stable within the therapeutic range  Pharmacist will follow and monitor/adjust dosing as necessary    Thank you for this consult,    Shira Sapp, PharmD, BCPS 6/13/2025 7:44 AM  Ext [phone]

## 2025-06-13 NOTE — CARE COORDINATION
Patient on continuous bipap and palliative was consulted. Informed by palliative that patient's HCPOALeonie is agreeable to hospice and requested referral to [facility] by [facility]; referral sent via [facility].    Electronically signed by DELBERT Pang on 6/13/2025 at 11:15 AM

## 2025-06-13 NOTE — PROGRESS NOTES
Palliative Care Department  [phone]  Palliative Care Progress Note  Provider Megan Child, APRN - CNP     Len Dorsey  [id number]  Hospital Day: 26  Date of Initial Consult: 5/23/25  Referring Provider: Ramila Chase MD   Palliative Medicine was consulted for assistance with: Goals of Care    HPI:   Len Dorsey is a 59 y.o. with a medical history of alcoholic liver disease, aortic valve replacement, mitral valve replacement, HFrEF 40-45%, CVA, testicular ca s/p chemotherapy, CAD s/p CABG  who was admitted on 5/19/2025 from home with a CHIEF COMPLAINT of altered mental status.  Patient's girlfriend/POA called EMS for patient being confused and \"dazed\".  He has a history of alcoholism, but his last drink was February 17, 2025.  In ER, found to be hypoxic 85%, supratherapeutic INR greater than 10, and had witnessed seizure activity described as generalized jerking with eyes deviated lasting 45 seconds.  CT head negative for acute findings, he was admitted to ICU for closer monitoring.  Neurology and nephrology consulted.  He was stabilized and transferred out of ICU on 5/22, 5/23 returns to ICU for worsening respiratory status and lethargy, intubated.  Palliative medicine consulted for further assistance with goals of care.    6/3: extubated to NC  6/4: transfer out of ICU  6/8: RRT overnight for AMS  6/9: RRT again AMS, CT head negative, cannot have MRI brain d/t pacer.   6/13: Bipap, further respiratory and cognitive decline. Family meeting with Attending physician, myself, and POA. Decision for Hospice care, DNR-CC, comfort medications initiated. Goal Hospice House.    ASSESSMENT/PLAN:     Pertinent Hospital Diagnoses     Altered mental status  Acute respiratory failure  Supratherapeutic INR  Acute renal failure  Alcoholic liver cirrhosis  CHF      Palliative Care Encounter / Counseling Regarding Goals of Care  Please see detailed goals of care discussion as below  At this time, Len  inpatient hospice is an appropriate option, she was agreeable to speak with hospice of [facility] for potential hospice house.  Explained we would begin comfort medications now, and consult hospice to continue planning, she is agreeable.    All questions answered, extensive emotional support given.  Palliative medicine will continue to follow closely.       OBJECTIVE:   Prognosis: Guarded    Physical Exam:  BP (!) 137/99   Pulse 85   Temp 97.8 °F (36.6 °C) (Oral)   Resp 22   Ht 1.778 m (5' 10\")   Wt 122.5 kg (270 lb)   SpO2 98%   BMI 38.74 kg/m²   Constitutional: Lethargic, not following commands, Bipap  Lungs: respirations labored  Heart::  RRR, distant heart tones  Abd:  Soft, non distended, bowel sounds present  Ext:  pulses present  Skin:  Warm and dry  Psych: NIMISHA  Neuro: Lethargic, not following commands    Objective data reviewed: labs, images, records, vitals, and chart    Discussed patient and the plan of care with the other IDT members: Palliative Medicine IDT Team, Floor Nurse, and Family    Time/Communication  Greater than 50% of time spent, total 50 minutes in counseling and coordination of care at the bedside regarding goals of care, diagnosis and prognosis, and see above.    Thank you for allowing Palliative Medicine to participate in the care of Len Dorsey.

## 2025-06-13 NOTE — PLAN OF CARE
Problem: Chronic Conditions and Co-morbidities  Goal: Patient's chronic conditions and co-morbidity symptoms are monitored and maintained or improved  Outcome: Progressing  Flowsheets (Taken 6/12/2025 0749)  Care Plan - Patient's Chronic Conditions and Co-Morbidity Symptoms are Monitored and Maintained or Improved: Monitor and assess patient's chronic conditions and comorbid symptoms for stability, deterioration, or improvement     Problem: Discharge Planning  Goal: Discharge to home or other facility with appropriate resources  Outcome: Progressing  Flowsheets (Taken 6/12/2025 0749)  Discharge to home or other facility with appropriate resources: Identify barriers to discharge with patient and caregiver     Problem: Safety - Adult  Goal: Free from fall injury  Outcome: Progressing     Problem: Pain  Goal: Verbalizes/displays adequate comfort level or baseline comfort level  Outcome: Progressing  Flowsheets (Taken 6/12/2025 0751)  Verbalizes/displays adequate comfort level or baseline comfort level: Encourage patient to monitor pain and request assistance     Problem: Skin/Tissue Integrity  Goal: Skin integrity remains intact  Description: 1.  Monitor for areas of redness and/or skin breakdown2.  Assess vascular access sites hourly3.  Every 4-6 hours minimum:  Change oxygen saturation probe site4.  Every 4-6 hours:  If on nasal continuous positive airway pressure, respiratory therapy assess nares and determine need for appliance change or resting period  Outcome: Progressing  Flowsheets (Taken 6/12/2025 0749)  Skin Integrity Remains Intact: Monitor for areas of redness and/or skin breakdown     Problem: Nutrition Deficit:  Goal: Optimize nutritional status  Outcome: Progressing     Problem: Neurosensory - Adult  Goal: Achieves stable or improved neurological status  Outcome: Progressing  Flowsheets (Taken 6/12/2025 0749)  Achieves stable or improved neurological status: Assess for and report changes in  without constant attention obtain sitter and review sitter guidelines with assigned personnel7. Initiate Psychosocial CNS and Spiritual Care consult, as indicated  Outcome: Progressing  Flowsheets (Taken 6/12/2025 0749)  Effect of thought disturbance (confusion, delirium, dementia, or psychosis) are managed with adequate functional status: Assess for contributors to thought disturbance, including medications, impaired vision or hearing, underlying metabolic abnormalities, dehydration, psychiatric diagnoses, notify LIP     Problem: ABCDS Injury Assessment  Goal: Absence of physical injury  Outcome: Progressing     Problem: Seizure Precautions  Goal: Remains free of injury related to seizures activity  Outcome: Progressing  Flowsheets (Taken 6/12/2025 0749)  Remains free of injury related to seizure activity: Maintain airway, patient safety  and administer oxygen as ordered

## 2025-06-13 NOTE — PROGRESS NOTES
Len Dorsey is a 59 y.o.  male     Neurology following for altered mental status    PMH of alcoholic liver disease, aortic valve replacement, mitral valve replacement, heart failure, previous CVA, testicular cancer s/p chemotherapy, coronary artery disease s/p CABG    Assessment:     Altered mental status  Patient with worsening confusion after Keppra was restarted  Keppra has been changed to Vimpat  Upon exam patient is lethargic    Postinfarct seizures  Patient developed seizures  Was maintained on Keppra however making him altered, this has been switched to Vimpat    Plan:     Cancel rapid EEG  Continue Vimpat 100 mg twice daily  Neurology will sign off, patient is now a DNR CC    History of Present Illness:     Patient initially presented to ED on 5/19 for failure to thrive.  He lives at home with his fiancée where he has a history of significant alcohol use although he has not had a drink in 1 month.  Patient reported he had not been taking his meds because he ran out of them and has not been going to his doctors appointments.  Initially neurology saw him 05/20 for AMS and possible seizure.  He has a diagnosis of his poststroke epilepsy and was having alcohol withdrawal which may have lowered his seizure threshold.  This would unlikely cause focal seizures typically.  His EEG on 5/23 showed moderate nonspecific encephalopathy without seizure with subsequent neuro signed off on 5/22 with plans for EEG as outpatient.     We were consulted again on 5/29 for reevaluation for continued AMS.  Renal dose Keppra was continued at that time which was 250 mg twice daily.  High-dose thiamine was also continued and neurology signed off.    Neurology was reconsulted on 6/9/2025 for worsening mentation and code stroke initiation.  Patient was obtunded and altered during the code stroke.  Still unable to have MRI brain and stat CT head was completed which was negative for acute abnormalities    Wife admitted that Keppra

## 2025-06-13 NOTE — PROGRESS NOTES
Antibiotic Extended Infusion Policy     This patient is on medication that requires renal, weight, and/or indication dose adjustment.      Date Body Weight IBW  Adjusted BW SCr  CrCl Dialysis status BMI   6/13/2025 122.5 kg (270 lb) Ideal body weight: 73 kg (160 lb 15 oz)  Adjusted ideal body weight: 92.8 kg (204 lb 9 oz) Serum creatinine: 1.6 mg/dL (H) 06/13/25 0430  Estimated creatinine clearance: 66 mL/min (A) N/a Body mass index is 38.74 kg/m².       Pharmacy has dose-adjusted the following medication(s):    Ordered Medication: Cefepime 2000mg q12h     Order Changed/converted to: Cefepime 2000mg q8h    These changes were made per protocol according to the [facility]   Automatic Extended Infusion Dose Adjustment Policy.     *Please note this dose may need readjusted if patient's condition changes.    Please contact pharmacy with any questions regarding these changes.    Shira Sapp RPH  6/13/2025  10:56 AM

## 2025-06-13 NOTE — CARE COORDINATION
Patient accepted by [facility]; can accept patient today at the hospice house. Ambulance transport set up for 3:30p via [facility]. Patient's POA, nurse and hospice updated.    Electronically signed by DELBERT Pang on 6/13/2025 at 1:34 PM

## 2025-06-13 NOTE — PROGRESS NOTES
Comprehensive Nutrition Assessment    Type and Reason for Visit:  Reassess    Nutrition Recommendations/Plan:   Tube feeding recommendation when needed.    Recommend Standard with Fiber (Jevity 1.5) @65ml/hr plus 1 protein modular daily to provide 1560ml, 2340 calories, 100g protein, 957ml water (2412 calories and 118g protein w/ protein modular), with flushes per physician.         Malnutrition Assessment:  Malnutrition Status:  At risk for malnutrition (06/13/25 1122)    Context:  Acute Illness     Findings of the 6 clinical characteristics of malnutrition:  Energy Intake:  Mild decrease in energy intake (overall since admission)  Weight Loss:  Unable to assess (d/t possible fluid shifts ; hx of CHF)     Body Fat Loss:  Unable to assess     Muscle Mass Loss:  Unable to assess    Fluid Accumulation:  No fluid accumulation     Strength:  Not Performed    Nutrition Assessment:    Patient remains NPO ; tube feedings not running at this time ; noted clinical indicators of moderate pharyngeal phase dysphagia per speech who recommended NPO with ongoing PO analysis by SLP only to determine if PO diet can be initiated ; s/p extubation on 6/3 ; s/p RRT on 6/8 and 6/9  ; adm w/ FTT ; noted AMS and new onset seizure (post-infarct seizures) ; ammonia level 70 ; noted hepatic encephalopathy ; s/p EEG on 5/23 (moderate nonspecific encephalopathy) ; noted alcoholic cirhosis and delirium tremors (alcohol withdrawal syndrome) ; FRANTZ on CKD ; noted acute hypoxic respiratory failure/septic shock/Beta D glucan antigenemia ; wounds noted ; FMS removed ; hx of testicular cancer s/p chemotherapy ; hx of CVA/CHF/COPD/CKD/CHB ; hx of CAD s/p CABG/AVR/MVR ; acute on chronic anemia ; cocncern for GI bleed during admission ; hx of upper GI bleed secondary to portal hypertensive gastropathy ; Hospice consulted ; will provide updated recommendations    Nutrition Related Findings:    -I&Os (-4.8 L), 1-2+ edema, A&O x 1, missing teeth,  Intake/Tolerance  Physical Signs/Symptoms Outcomes: Biochemical Data, GI Status, Fluid Status or Edema, Hemodynamic Status, Nutrition Focused Physical Findings, Skin, Weight    Discharge Planning:    Too soon to determine     Wilber Roger RD, LD  Contact: [phone]

## 2025-06-13 NOTE — DISCHARGE SUMMARY
Hospitalist Discharge Summary    Patient ID: Len Dorsey   Patient : 1965  Patient's PCP: Jama Skinner PA    Admit Date: 2025   Admitting Physician: Ramila Chase MD    Discharge Date:  2025   Discharge Physician: Laila Galindo MD   Discharge Condition: UNStable  Discharge Disposition: Home Hospice      Hospital course in brief:  (Please refer to daily progress notes for a comprehensive review of the hospitalization by requesting medical records)       Patient presented to the emergency department with concerns about not being able to take care of himself. Patient apparently been canceled doctors appointments and not taking medications. . INR is greater than 10. CT imaging shows some increased third spacing of fluid. Chest x-ray shows bibasilar opacities. Patient was started on ceftriaxone and doxycycline for presumed pneumonia. He was also noted to have seizure-like activity for which she was given Ativan. He was also placed on CIWA protocol. Patient be admitted to the ICU.  ID consulted and s/p micafungin for beta D glucan antigenemia and Candida colonization          restarted on levophed, bumex held    - on Levophed, midodrine increased    - continues on Levophed, intermittently following commands, midodrine increased    - Bleeding through NG tube, INR 4.1, given ffp and vitamin K. GI consulted    still with bleeding per NG but improved. Remains on precedex    on precedex but awake, following commands, on SBT  6/3 extubated to nasal cannula   transferred out of the ICU   to have IR guided PICC line placed, Speech to work with him again today.      -dc plan is select precert pending; pt on eval states he did not have good sleep and is feeling horrible; melatonin added        -stop micafungin per ID; given pt inr is 1.6 (subtherapeutic -goal of 2.5-3.5); heparin drip added for bridging     -pt had rrt overnight for altered mentation  Pt  cerebellar hemispheres. 3. Mild-to-moderate cerebral volume loss and mild chronic microvascular ischemic changes.     XR CHEST PORTABLE  Result Date: 5/23/2025  EXAMINATION: ONE XRAY VIEW OF THE CHEST 5/23/2025 3:28 pm COMPARISON: Chest radiograph 05/23/2025 at 12:12 p.m. HISTORY: ORDERING SYSTEM PROVIDED HISTORY: cvc placement TECHNOLOGIST PROVIDED HISTORY: Reason for exam:->cvc placement FINDINGS: Interval placement of right internal jugular central venous catheter with tip at the cavoatrial junction.  Endotracheal tube tip is 3.8 cm above the christina.  Enteric tube tip and side hole are in the stomach. Improved aeration of the left lung field.  Similar peripherally oriented right sided airspace opacity.  Improved aeration of the right perihilar region.  Moderate right and small left pleural effusions.  No pneumothorax bilaterally. Cardiomegaly. No acute osseous abnormality.     Interval placement of right internal jugular central venous catheter with tip at the cavoatrial junction.  No pneumothorax.     XR CHEST PORTABLE  Result Date: 5/23/2025  EXAMINATION: ONE XRAY VIEW OF THE CHEST 5/23/2025 12:23 pm COMPARISON: May 19, 2025 HISTORY: ORDERING SYSTEM PROVIDED HISTORY: impending resp failure TECHNOLOGIST PROVIDED HISTORY: Reason for exam:->impending resp failure FINDINGS: Pacemaker overlying the chest.  Enteric tube extending into the stomach. Prior cardiothoracic surgery.  Prosthetic cardiac valves. Bilateral pulmonary parenchymal opacities related to infection or edema. Bilateral pleural effusions.  No pneumothorax.  Stable cardiac and mediastinal silhouettes.     Worsening bilateral pulmonary parenchymal opacities related to infection or edema. Bilateral pleural effusions.     XR CHEST ABDOMEN NG PLACEMENT  Result Date: 5/20/2025  EXAMINATION: ONE SUPINE XRAY VIEW(S) OF THE ABDOMEN 5/20/2025 5:28 pm COMPARISON: None. HISTORY: ORDERING SYSTEM PROVIDED HISTORY: NG placement TECHNOLOGIST PROVIDED HISTORY: Reason

## 2025-06-13 NOTE — PLAN OF CARE
Problem: Chronic Conditions and Co-morbidities  Goal: Patient's chronic conditions and co-morbidity symptoms are monitored and maintained or improved  Outcome: Completed     Problem: Discharge Planning  Goal: Discharge to home or other facility with appropriate resources  Outcome: Completed     Problem: Safety - Adult  Goal: Free from fall injury  Outcome: Completed     Problem: Pain  Goal: Verbalizes/displays adequate comfort level or baseline comfort level  Outcome: Completed     Problem: Skin/Tissue Integrity  Goal: Skin integrity remains intact  Description: 1.  Monitor for areas of redness and/or skin breakdown2.  Assess vascular access sites hourly3.  Every 4-6 hours minimum:  Change oxygen saturation probe site4.  Every 4-6 hours:  If on nasal continuous positive airway pressure, respiratory therapy assess nares and determine need for appliance change or resting period  Outcome: Completed     Problem: Nutrition Deficit:  Goal: Optimize nutritional status  Outcome: Completed  Flowsheets (Taken 6/13/2025 1050 by Wilber Roger, RD, LD)  Nutrient intake appropriate for improving, restoring, or maintaining nutritional needs: Recommend, monitor, and adjust tube feedings and TPN/PPN based on assessed needs     Problem: Neurosensory - Adult  Goal: Achieves stable or improved neurological status  Outcome: Completed     Problem: Neurosensory - Adult  Goal: Absence of seizures  Outcome: Completed     Problem: Respiratory - Adult  Goal: Achieves optimal ventilation and oxygenation  Outcome: Completed     Problem: Skin/Tissue Integrity - Adult  Goal: Skin integrity remains intact  Description: 1.  Monitor for areas of redness and/or skin breakdown2.  Assess vascular access sites hourly3.  Every 4-6 hours minimum:  Change oxygen saturation probe site4.  Every 4-6 hours:  If on nasal continuous positive airway pressure, respiratory therapy assess nares and determine need for appliance change or resting period  Outcome:  Completed     Problem: Gastrointestinal - Adult  Goal: Minimal or absence of nausea and vomiting  Outcome: Completed     Problem: Gastrointestinal - Adult  Goal: Maintains adequate nutritional intake  Outcome: Completed     Problem: Genitourinary - Adult  Goal: Urinary catheter remains patent  Outcome: Completed     Problem: Infection - Adult  Goal: Absence of infection during hospitalization  Outcome: Completed     Problem: Metabolic/Fluid and Electrolytes - Adult  Goal: Electrolytes maintained within normal limits  Outcome: Completed     Problem: Metabolic/Fluid and Electrolytes - Adult  Goal: Hemodynamic stability and optimal renal function maintained  Outcome: Completed     Problem: Confusion  Goal: Confusion, delirium, dementia, or psychosis is improved or at baseline  Description: INTERVENTIONS:1. Assess for possible contributors to thought disturbance, including medications, impaired vision or hearing, underlying metabolic abnormalities, dehydration, psychiatric diagnoses, and notify attending LIP2. [place] high risk fall precautions, as indicated3. Provide frequent short contacts to provide reality reorientation, refocusing and direction4. Decrease environmental stimuli, including noise as appropriate5. Monitor and intervene to maintain adequate nutrition, hydration, elimination, sleep and activity6. If unable to ensure safety without constant attention obtain sitter and review sitter guidelines with assigned personnel7. Initiate Psychosocial CNS and Spiritual Care consult, as indicated  INTERVENTIONS:1. Assess for possible contributors to thought disturbance, including medications, impaired vision or hearing, underlying metabolic abnormalities, dehydration, psychiatric diagnoses, and notify attending LIP2. [place] high risk fall precautions, as indicated3. Provide frequent short contacts to provide reality reorientation, refocusing and direction4. Decrease environmental stimuli, including noise as  function  Outcome: Completed     Problem: Musculoskeletal - Adult  Goal: Maintain proper alignment of affected body part  Outcome: Completed     Problem: Seizure Precautions  Goal: Remains free of injury related to seizures activity  Outcome: Completed  Flowsheets (Taken 6/13/2025 0930 by Ceci Small)  Remains free of injury related to seizure activity: Maintain airway, patient safety  and administer oxygen as ordered

## 2025-06-16 ENCOUNTER — TELEPHONE (OUTPATIENT)
Dept: OTHER | Age: 60
End: 2025-06-16

## 2025-06-16 NOTE — TELEPHONE ENCOUNTER
8:01 AM  Family called into CHF clinic re: passing of patient last evening.     Further appts cancelled.     Electronically signed by Diane Cazares RN on 6/16/2025 at 8:02 AM

## 2025-06-17 LAB
MICROORGANISM SPEC CULT: NORMAL
SERVICE CMNT-IMP: NORMAL
SPECIMEN DESCRIPTION: NORMAL

## 2025-06-18 NOTE — PROGRESS NOTES
Physician Progress Note      PATIENT:               ZAN GARZA  CSN #:                  [id number]  :                       1965  ADMIT DATE:       2025 10:40 AM  DISCH DATE:        2025 4:38 PM  RESPONDING  PROVIDER #:        Laila Galindo MD          QUERY TEXT:    Sepsis is documented by Dr. Chandra on progress note dated 25.  Based on   your medical judgment, please clarify the POA status at the time of the order   to admit the patient to inpatient status:    The clinical indicators include:  - \"Leukocytosis\" as per the H&P at the time of admission.  -\" Doxycycline and ceftriaxone, - Respiratory cultures\" per the H&P at the   time of admission.  -\"Sepsis\" is documented by Dr. Chandra on progress note dated 25.  -LABS: 25: WBC: 12.3, repeated on 25 16.2 per the lab results.  - PROCAL: 25 0.19 and repeated 25 at 0.35, per the lab results.  Options provided:  -- Present on admission  -- Not present on admission  -- Clinically unable to determine if the condition was present on admission  -- Other - I will add my own diagnosis  -- Disagree - Not applicable / Not valid  -- Disagree - Clinically unable to determine / Unknown  -- Refer to Clinical Documentation Reviewer    PROVIDER RESPONSE TEXT:    The diagnosis was present on admission.    Query created by: Farnaz Gaston on 2025 10:55 AM      Electronically signed by:  Laila Galindo MD 2025 11:51 AM

## (undated) PROCEDURE — 02HV33Z INSERTION OF INFUSION DEVICE INTO SUPERIOR VENA CAVA, PERCUTANEOUS APPROACH: ICD-10-PCS

## (undated) PROCEDURE — 0BH17EZ INSERTION OF ENDOTRACHEAL AIRWAY INTO TRACHEA, VIA NATURAL OR ARTIFICIAL OPENING: ICD-10-PCS

## (undated) PROCEDURE — 5A1955Z RESPIRATORY VENTILATION, GREATER THAN 96 CONSECUTIVE HOURS: ICD-10-PCS

## (undated) PROCEDURE — 06HY33Z INSERTION OF INFUSION DEVICE INTO LOWER VEIN, PERCUTANEOUS APPROACH: ICD-10-PCS

## (undated) PROCEDURE — 3E043XZ INTRODUCTION OF VASOPRESSOR INTO CENTRAL VEIN, PERCUTANEOUS APPROACH: ICD-10-PCS

## (undated) PROCEDURE — 30233N1 TRANSFUSION OF NONAUTOLOGOUS RED BLOOD CELLS INTO PERIPHERAL VEIN, PERCUTANEOUS APPROACH: ICD-10-PCS

## (undated) PROCEDURE — 4A00X4Z MEASUREMENT OF CENTRAL NERVOUS ELECTRICAL ACTIVITY, EXTERNAL APPROACH: ICD-10-PCS

## (undated) PROCEDURE — 03HY32Z INSERTION OF MONITORING DEVICE INTO UPPER ARTERY, PERCUTANEOUS APPROACH: ICD-10-PCS

## (undated) DEVICE — ELECTRODE PT RET AD L9FT HI MOIST COND ADH HYDRGEL CORDED

## (undated) DEVICE — DEFENDO AIR WATER SUCTION AND BIOPSY VALVE KIT FOR  OLYMPUS: Brand: DEFENDO AIR/WATER/SUCTION AND BIOPSY VALVE

## (undated) DEVICE — BLOCK BITE 60FR RUBBER ADLT DENTAL

## (undated) DEVICE — FORMALIN  10%NBF 60ML PREFLL CONT

## (undated) DEVICE — BITEBLOCK 54FR W/ DENT RIM BLOX

## (undated) DEVICE — TRAP POLYP ETRAP

## (undated) DEVICE — AIR/WATER CLEANING ADAPTER FOR OLYMPUS® GI ENDOSCOPE: Brand: BULLDOG®

## (undated) DEVICE — CONTAINER SPEC 60ML PH 7NEUTRAL BUFF FRMLN RDY TO USE

## (undated) DEVICE — SPONGE GZ W4XL4IN RAYON POLY CVR W/NONWOVEN FAB STRL 2/PK

## (undated) DEVICE — Z DISCONTINUED NO SUB IDED TUBING ETCO2 AD L6.5FT NSL ORAL CVD PRNG NONFLARED TIP OVR

## (undated) DEVICE — SPONGE GZ W4XL4IN RAYON POLY FILL CVR W/ NONWOVEN FAB

## (undated) DEVICE — FORCEPS BX L160CM JAW DIA2.4MM YEL L CAP W/ NDL DISP RAD

## (undated) DEVICE — SNARE ENDOSCP POLYP SM 2.4 MM 195 CM 13 MM 2.8 MM CAPTIVATOR

## (undated) DEVICE — GRADUATE TRIANG MEASURE 1000ML BLK PRNT

## (undated) DEVICE — AIR SHEET,LAT,COMFORT GLIDE, BLEND 40X80: Brand: MEDLINE

## (undated) DEVICE — ENDOSCOPIC KIT 1.1+ OP4 NO CP DE

## (undated) DEVICE — CONNECTOR IRRIGATION AUXILIARY H2O JET W/ PRT MTL THRD HYDR

## (undated) DEVICE — GAUZE,SPONGE,4"X4",8PLY,STRL,LF,10/TRAY: Brand: MEDLINE

## (undated) NOTE — ED AVS SNAPSHOT
Christofer Hancock   MRN: [id number]    Department:  [facility] Emergency Department   Date of Visit:  9/5/2017           Disclosure     Insurance plans vary and the physician(s) referred by the ER may not be covered by your plan.  Please conta CARE PHYSICIAN AT ONCE OR RETURN IMMEDIATELY TO THE EMERGENCY DEPARTMENT. If you have been prescribed any medication(s), please fill your prescription right away and begin taking the medication(s) as directed.   If you believe that any of the medications